# Patient Record
Sex: FEMALE | Race: WHITE | NOT HISPANIC OR LATINO
[De-identification: names, ages, dates, MRNs, and addresses within clinical notes are randomized per-mention and may not be internally consistent; named-entity substitution may affect disease eponyms.]

---

## 2017-01-25 ENCOUNTER — MEDICATION RENEWAL (OUTPATIENT)
Age: 57
End: 2017-01-25

## 2017-02-21 ENCOUNTER — MEDICATION RENEWAL (OUTPATIENT)
Age: 57
End: 2017-02-21

## 2017-02-22 ENCOUNTER — RX RENEWAL (OUTPATIENT)
Age: 57
End: 2017-02-22

## 2017-03-24 ENCOUNTER — OUTPATIENT (OUTPATIENT)
Dept: OUTPATIENT SERVICES | Facility: HOSPITAL | Age: 57
LOS: 1 days | End: 2017-03-24
Payer: COMMERCIAL

## 2017-03-24 VITALS
WEIGHT: 210.1 LBS | HEIGHT: 64 IN | OXYGEN SATURATION: 98 % | DIASTOLIC BLOOD PRESSURE: 60 MMHG | HEART RATE: 88 BPM | RESPIRATION RATE: 16 BRPM | SYSTOLIC BLOOD PRESSURE: 112 MMHG | TEMPERATURE: 98 F

## 2017-03-24 DIAGNOSIS — I25.10 ATHEROSCLEROTIC HEART DISEASE OF NATIVE CORONARY ARTERY WITHOUT ANGINA PECTORIS: ICD-10-CM

## 2017-03-24 LAB
ALBUMIN SERPL ELPH-MCNC: 4.1 G/DL — SIGNIFICANT CHANGE UP (ref 3.3–5)
ALP SERPL-CCNC: 49 U/L — SIGNIFICANT CHANGE UP (ref 40–120)
ALT FLD-CCNC: 16 U/L RC — SIGNIFICANT CHANGE UP (ref 10–45)
ANION GAP SERPL CALC-SCNC: 13 MMOL/L — SIGNIFICANT CHANGE UP (ref 5–17)
AST SERPL-CCNC: 26 U/L — SIGNIFICANT CHANGE UP (ref 10–40)
BILIRUB SERPL-MCNC: 0.3 MG/DL — SIGNIFICANT CHANGE UP (ref 0.2–1.2)
BUN SERPL-MCNC: 16 MG/DL — SIGNIFICANT CHANGE UP (ref 7–23)
CALCIUM SERPL-MCNC: 10.2 MG/DL — SIGNIFICANT CHANGE UP (ref 8.4–10.5)
CHLORIDE SERPL-SCNC: 99 MMOL/L — SIGNIFICANT CHANGE UP (ref 96–108)
CO2 SERPL-SCNC: 30 MMOL/L — SIGNIFICANT CHANGE UP (ref 22–31)
CREAT SERPL-MCNC: 1.01 MG/DL — SIGNIFICANT CHANGE UP (ref 0.5–1.3)
GLUCOSE SERPL-MCNC: 110 MG/DL — HIGH (ref 70–99)
HCT VFR BLD CALC: 35.2 % — SIGNIFICANT CHANGE UP (ref 34.5–45)
HGB BLD-MCNC: 11.8 G/DL — SIGNIFICANT CHANGE UP (ref 11.5–15.5)
MCHC RBC-ENTMCNC: 30 PG — SIGNIFICANT CHANGE UP (ref 27–34)
MCHC RBC-ENTMCNC: 33.5 GM/DL — SIGNIFICANT CHANGE UP (ref 32–36)
MCV RBC AUTO: 89.6 FL — SIGNIFICANT CHANGE UP (ref 80–100)
PLATELET # BLD AUTO: 286 K/UL — SIGNIFICANT CHANGE UP (ref 150–400)
POTASSIUM SERPL-MCNC: 4.9 MMOL/L — SIGNIFICANT CHANGE UP (ref 3.5–5.3)
POTASSIUM SERPL-SCNC: 4.9 MMOL/L — SIGNIFICANT CHANGE UP (ref 3.5–5.3)
PROT SERPL-MCNC: 7.1 G/DL — SIGNIFICANT CHANGE UP (ref 6–8.3)
RBC # BLD: 3.93 M/UL — SIGNIFICANT CHANGE UP (ref 3.8–5.2)
RBC # FLD: 12.1 % — SIGNIFICANT CHANGE UP (ref 10.3–14.5)
SODIUM SERPL-SCNC: 142 MMOL/L — SIGNIFICANT CHANGE UP (ref 135–145)
WBC # BLD: 7.7 K/UL — SIGNIFICANT CHANGE UP (ref 3.8–10.5)
WBC # FLD AUTO: 7.7 K/UL — SIGNIFICANT CHANGE UP (ref 3.8–10.5)

## 2017-03-24 PROCEDURE — 80053 COMPREHEN METABOLIC PANEL: CPT

## 2017-03-24 PROCEDURE — 93005 ELECTROCARDIOGRAM TRACING: CPT

## 2017-03-24 PROCEDURE — 93010 ELECTROCARDIOGRAM REPORT: CPT

## 2017-03-24 PROCEDURE — C1887: CPT

## 2017-03-24 PROCEDURE — 93460 R&L HRT ART/VENTRICLE ANGIO: CPT

## 2017-03-24 PROCEDURE — C1769: CPT

## 2017-03-24 PROCEDURE — 85027 COMPLETE CBC AUTOMATED: CPT

## 2017-03-24 PROCEDURE — C1894: CPT

## 2017-03-24 PROCEDURE — 93460 R&L HRT ART/VENTRICLE ANGIO: CPT | Mod: 26,GC

## 2017-03-24 NOTE — H&P CARDIOLOGY - PMH
Chronic sinusitis    Claustrophobia    COPD (chronic obstructive pulmonary disease)    DM (diabetes mellitus)    HTN (hypertension)    Hyperlipemia    Raynaud phenomenon

## 2017-03-24 NOTE — H&P CARDIOLOGY - HISTORY OF PRESENT ILLNESS
This is a 58 yo F w/ PMH of  COPD/emphysema, (GOLD 4, on home O2 3L NC/24 hrs a day), HTN, HLD, CAD s/p x6 stents ( last 02/2016 at [facility]),  DM type 2 ( under control as per patient, uncomplicated, last A1C unknown), pulmonary HTN, PVD.  Presents to Md Chapa with c/o of  dyspnea. Patient reports worsening SOB with minimal or no activity, worse since december.  Referred here today fro [facility] and Left [facility]. Currenlty on 3L nC, cont to have dyspnea, denies chest pain, palpitations, dizziness, N&V, HA.     Pulm: Md Anita Mathew    *** Of NOte: patient is trying to get back onto list for lung transplant*** This is a 56 yo F w/ PMH of  COPD/emphysema, (GOLD 4, on home O2 3L NC/24 hrs a day), HTN, HLD, CAD s/p x6 stents ( last 02/2016 at [facility]),  DM type 2 ( under control as per patient, uncomplicated, last A1C unknown), pulmonary HTN, PVD. Significant FH for MI ( father had MI at age 50).   Presents to Md Chapa with c/o of  dyspnea. Patient reports worsening SOB with minimal or no activity, worse since december.  Referred here today fro Right and Left [facility]. Currently on 3L nC, cont to have dyspnea, denies chest pain, palpitations, dizziness, N&V, HA.     Pulm: Md Anita Mathew    *** Of NOte: patient is trying to get back onto list for lung transplant***

## 2017-03-24 NOTE — H&P CARDIOLOGY - FAMILY HISTORY
Father  Still living? Unknown  FH: CABG (coronary artery bypass surgery), Age at diagnosis: Age Unknown  FH: MI (myocardial infarction), Age at diagnosis: Age Unknown

## 2017-04-10 ENCOUNTER — RX RENEWAL (OUTPATIENT)
Age: 57
End: 2017-04-10

## 2017-04-10 ENCOUNTER — APPOINTMENT (OUTPATIENT)
Dept: INTERNAL MEDICINE | Facility: CLINIC | Age: 57
End: 2017-04-10

## 2017-04-10 VITALS
SYSTOLIC BLOOD PRESSURE: 104 MMHG | HEIGHT: 64.5 IN | DIASTOLIC BLOOD PRESSURE: 66 MMHG | WEIGHT: 207 LBS | BODY MASS INDEX: 34.91 KG/M2 | OXYGEN SATURATION: 88 % | HEART RATE: 110 BPM | TEMPERATURE: 98.4 F

## 2017-04-10 DIAGNOSIS — Z23 ENCOUNTER FOR IMMUNIZATION: ICD-10-CM

## 2017-04-11 ENCOUNTER — RX RENEWAL (OUTPATIENT)
Age: 57
End: 2017-04-11

## 2017-04-14 LAB
25(OH)D3 SERPL-MCNC: 58.6 NG/ML
ALBUMIN SERPL ELPH-MCNC: 4 G/DL
ALP BLD-CCNC: 49 U/L
ALT SERPL-CCNC: 7 U/L
ANION GAP SERPL CALC-SCNC: 17 MMOL/L
AST SERPL-CCNC: 20 U/L
BASOPHILS # BLD AUTO: 0.01 K/UL
BASOPHILS NFR BLD AUTO: 0.1 %
BILIRUB SERPL-MCNC: 0.2 MG/DL
BUN SERPL-MCNC: 13 MG/DL
CALCIUM SERPL-MCNC: 10.4 MG/DL
CHLORIDE SERPL-SCNC: 96 MMOL/L
CHOLEST SERPL-MCNC: 165 MG/DL
CHOLEST/HDLC SERPL: 1.9 RATIO
CO2 SERPL-SCNC: 29 MMOL/L
CREAT SERPL-MCNC: 0.86 MG/DL
EOSINOPHIL # BLD AUTO: 0 K/UL
EOSINOPHIL NFR BLD AUTO: 0 %
GLUCOSE SERPL-MCNC: 125 MG/DL
HBA1C MFR BLD HPLC: 5.8 %
HCT VFR BLD CALC: 36.9 %
HDLC SERPL-MCNC: 86 MG/DL
HGB BLD-MCNC: 11.4 G/DL
IMM GRANULOCYTES NFR BLD AUTO: 0.3 %
IRON SERPL-MCNC: 70 UG/DL
LDLC SERPL CALC-MCNC: 69 MG/DL
LYMPHOCYTES # BLD AUTO: 0.7 K/UL
LYMPHOCYTES NFR BLD AUTO: 9.6 %
MAN DIFF?: NORMAL
MCHC RBC-ENTMCNC: 28.6 PG
MCHC RBC-ENTMCNC: 30.9 GM/DL
MCV RBC AUTO: 92.5 FL
MONOCYTES # BLD AUTO: 0.31 K/UL
MONOCYTES NFR BLD AUTO: 4.3 %
NEUTROPHILS # BLD AUTO: 6.22 K/UL
NEUTROPHILS NFR BLD AUTO: 85.7 %
PLATELET # BLD AUTO: 359 K/UL
POTASSIUM SERPL-SCNC: 5 MMOL/L
PROT SERPL-MCNC: 7.7 G/DL
RBC # BLD: 3.99 M/UL
RBC # FLD: 13.1 %
SODIUM SERPL-SCNC: 142 MMOL/L
TRIGL SERPL-MCNC: 51 MG/DL
TSH SERPL-ACNC: 0.87 UIU/ML
WBC # FLD AUTO: 7.26 K/UL

## 2017-04-27 ENCOUNTER — MEDICATION RENEWAL (OUTPATIENT)
Age: 57
End: 2017-04-27

## 2017-04-27 ENCOUNTER — APPOINTMENT (OUTPATIENT)
Dept: INTERNAL MEDICINE | Facility: CLINIC | Age: 57
End: 2017-04-27

## 2017-05-03 ENCOUNTER — APPOINTMENT (OUTPATIENT)
Dept: INTERNAL MEDICINE | Facility: CLINIC | Age: 57
End: 2017-05-03

## 2017-05-03 VITALS
TEMPERATURE: 98.4 F | WEIGHT: 204 LBS | SYSTOLIC BLOOD PRESSURE: 110 MMHG | OXYGEN SATURATION: 96 % | HEIGHT: 64.5 IN | DIASTOLIC BLOOD PRESSURE: 70 MMHG | BODY MASS INDEX: 34.4 KG/M2 | HEART RATE: 110 BPM

## 2017-05-03 RX ORDER — CEFUROXIME AXETIL 500 MG/1
500 TABLET ORAL
Qty: 20 | Refills: 0 | Status: DISCONTINUED | COMMUNITY
Start: 2017-04-10 | End: 2017-05-03

## 2017-05-22 ENCOUNTER — OTHER (OUTPATIENT)
Age: 57
End: 2017-05-22

## 2017-05-22 DIAGNOSIS — R29.898 OTHER SYMPTOMS AND SIGNS INVOLVING THE MUSCULOSKELETAL SYSTEM: ICD-10-CM

## 2017-06-05 ENCOUNTER — RX RENEWAL (OUTPATIENT)
Age: 57
End: 2017-06-05

## 2017-06-07 ENCOUNTER — APPOINTMENT (OUTPATIENT)
Dept: INTERNAL MEDICINE | Facility: CLINIC | Age: 57
End: 2017-06-07

## 2017-06-07 VITALS
OXYGEN SATURATION: 100 % | SYSTOLIC BLOOD PRESSURE: 100 MMHG | DIASTOLIC BLOOD PRESSURE: 70 MMHG | TEMPERATURE: 98.5 F | HEART RATE: 88 BPM | WEIGHT: 204 LBS | HEIGHT: 64 IN | BODY MASS INDEX: 34.83 KG/M2

## 2017-06-07 DIAGNOSIS — J20.9 ACUTE BRONCHITIS, UNSPECIFIED: ICD-10-CM

## 2017-06-07 DIAGNOSIS — J42 ACUTE BRONCHITIS, UNSPECIFIED: ICD-10-CM

## 2017-06-07 DIAGNOSIS — Z87.898 PERSONAL HISTORY OF OTHER SPECIFIED CONDITIONS: ICD-10-CM

## 2017-06-07 RX ORDER — PREDNISONE 10 MG/1
10 TABLET ORAL
Qty: 90 | Refills: 2 | Status: DISCONTINUED | COMMUNITY
Start: 2017-04-11 | End: 2017-06-07

## 2017-06-07 RX ORDER — AMOXICILLIN AND CLAVULANATE POTASSIUM 875; 125 MG/1; MG/1
875-125 TABLET, COATED ORAL TWICE DAILY
Qty: 20 | Refills: 0 | Status: DISCONTINUED | COMMUNITY
Start: 2017-04-27 | End: 2017-06-07

## 2017-06-07 RX ORDER — PREDNISONE 10 MG/1
10 TABLET ORAL
Qty: 90 | Refills: 0 | Status: DISCONTINUED | COMMUNITY
Start: 2017-04-10 | End: 2017-06-07

## 2017-06-07 RX ORDER — PREDNISONE 5 MG/1
5 TABLET ORAL DAILY
Qty: 90 | Refills: 5 | Status: DISCONTINUED | COMMUNITY
Start: 2017-05-03 | End: 2017-06-07

## 2017-06-07 RX ORDER — AZITHROMYCIN 250 MG/1
250 TABLET, FILM COATED ORAL
Qty: 1 | Refills: 0 | Status: DISCONTINUED | COMMUNITY
Start: 2017-04-27 | End: 2017-06-07

## 2017-06-23 ENCOUNTER — RX RENEWAL (OUTPATIENT)
Age: 57
End: 2017-06-23

## 2017-07-12 ENCOUNTER — MEDICATION RENEWAL (OUTPATIENT)
Age: 57
End: 2017-07-12

## 2017-07-18 ENCOUNTER — RX RENEWAL (OUTPATIENT)
Age: 57
End: 2017-07-18

## 2017-07-25 ENCOUNTER — APPOINTMENT (OUTPATIENT)
Dept: INTERNAL MEDICINE | Facility: CLINIC | Age: 57
End: 2017-07-25

## 2017-07-25 VITALS
DIASTOLIC BLOOD PRESSURE: 66 MMHG | OXYGEN SATURATION: 93 % | TEMPERATURE: 97.8 F | HEART RATE: 99 BPM | WEIGHT: 200 LBS | BODY MASS INDEX: 34.15 KG/M2 | HEIGHT: 64 IN | SYSTOLIC BLOOD PRESSURE: 110 MMHG

## 2017-07-28 LAB
25(OH)D3 SERPL-MCNC: 48.8 NG/ML
ALBUMIN MFR SERPL ELPH: 55.6 %
ALBUMIN SERPL ELPH-MCNC: 4.2 G/DL
ALBUMIN SERPL-MCNC: 4.3 G/DL
ALBUMIN/GLOB SERPL: 1.3 RATIO
ALP BLD-CCNC: 46 U/L
ALPHA1 GLOB MFR SERPL ELPH: 5.6 %
ALPHA1 GLOB SERPL ELPH-MCNC: 0.4 G/DL
ALPHA2 GLOB MFR SERPL ELPH: 13.1 %
ALPHA2 GLOB SERPL ELPH-MCNC: 1 G/DL
ALT SERPL-CCNC: 24 U/L
ANION GAP SERPL CALC-SCNC: 14 MMOL/L
AST SERPL-CCNC: 29 U/L
B-GLOBULIN MFR SERPL ELPH: 12.7 %
B-GLOBULIN SERPL ELPH-MCNC: 1 G/DL
BASOPHILS # BLD AUTO: 0.02 K/UL
BASOPHILS NFR BLD AUTO: 0.3 %
BILIRUB SERPL-MCNC: 0.3 MG/DL
BUN SERPL-MCNC: 10 MG/DL
CALCIUM SERPL-MCNC: 10.7 MG/DL
CHLORIDE SERPL-SCNC: 97 MMOL/L
CHOLEST SERPL-MCNC: 165 MG/DL
CHOLEST/HDLC SERPL: 1.9 RATIO
CO2 SERPL-SCNC: 30 MMOL/L
CREAT SERPL-MCNC: 1.15 MG/DL
EOSINOPHIL # BLD AUTO: 0.22 K/UL
EOSINOPHIL NFR BLD AUTO: 3.3 %
FOLATE SERPL-MCNC: 6.1 NG/ML
GAMMA GLOB FLD ELPH-MCNC: 1 G/DL
GAMMA GLOB MFR SERPL ELPH: 13 %
GLUCOSE SERPL-MCNC: 69 MG/DL
HAPTOGLOB SERPL-MCNC: 165 MG/DL
HBA1C MFR BLD HPLC: 5.9 %
HCT VFR BLD CALC: 35.8 %
HDLC SERPL-MCNC: 89 MG/DL
HGB BLD-MCNC: 10.5 G/DL
IMM GRANULOCYTES NFR BLD AUTO: 0.1 %
INTERPRETATION SERPL IEP-IMP: NORMAL
IRON SATN MFR SERPL: 12 %
IRON SERPL-MCNC: 41 UG/DL
LDLC SERPL CALC-MCNC: 62 MG/DL
LYMPHOCYTES # BLD AUTO: 1.39 K/UL
LYMPHOCYTES NFR BLD AUTO: 20.7 %
MAN DIFF?: NORMAL
MCHC RBC-ENTMCNC: 26.6 PG
MCHC RBC-ENTMCNC: 29.3 GM/DL
MCV RBC AUTO: 90.9 FL
MONOCYTES # BLD AUTO: 0.46 K/UL
MONOCYTES NFR BLD AUTO: 6.8 %
NEUTROPHILS # BLD AUTO: 4.63 K/UL
NEUTROPHILS NFR BLD AUTO: 68.8 %
PLATELET # BLD AUTO: 266 K/UL
POTASSIUM SERPL-SCNC: 4.7 MMOL/L
PROT SERPL-MCNC: 7.7 G/DL
RBC # BLD: 3.94 M/UL
RBC # FLD: 15.3 %
SODIUM SERPL-SCNC: 141 MMOL/L
TIBC SERPL-MCNC: 343 UG/DL
TRIGL SERPL-MCNC: 70 MG/DL
TSH SERPL-ACNC: 2.26 UIU/ML
UIBC SERPL-MCNC: 302 UG/DL
VIT B12 SERPL-MCNC: 813 PG/ML
WBC # FLD AUTO: 6.73 K/UL

## 2017-08-01 LAB
HGB A MFR BLD: 92.8 %
HGB A2 MFR BLD: 2.5 %
HGB F MFR BLD: 0 %
HGB FRACT BLD-IMP: NORMAL
HGB OTHER MFR BLD ELPH: 4.7 %

## 2017-08-14 ENCOUNTER — RX RENEWAL (OUTPATIENT)
Age: 57
End: 2017-08-14

## 2017-08-30 ENCOUNTER — APPOINTMENT (OUTPATIENT)
Dept: INTERNAL MEDICINE | Facility: CLINIC | Age: 57
End: 2017-08-30
Payer: COMMERCIAL

## 2017-08-30 VITALS
OXYGEN SATURATION: 94 % | DIASTOLIC BLOOD PRESSURE: 56 MMHG | BODY MASS INDEX: 34.15 KG/M2 | HEIGHT: 64 IN | TEMPERATURE: 97.9 F | WEIGHT: 200 LBS | SYSTOLIC BLOOD PRESSURE: 106 MMHG | HEART RATE: 96 BPM

## 2017-08-30 DIAGNOSIS — F32.9 MAJOR DEPRESSIVE DISORDER, SINGLE EPISODE, UNSPECIFIED: ICD-10-CM

## 2017-08-30 PROCEDURE — 99214 OFFICE O/P EST MOD 30 MIN: CPT | Mod: 25

## 2017-08-30 PROCEDURE — 36415 COLL VENOUS BLD VENIPUNCTURE: CPT

## 2017-09-01 LAB
ALBUMIN SERPL ELPH-MCNC: 4.3 G/DL
ALP BLD-CCNC: 38 U/L
ALT SERPL-CCNC: 18 U/L
ANION GAP SERPL CALC-SCNC: 16 MMOL/L
AST SERPL-CCNC: 22 U/L
BASOPHILS # BLD AUTO: 0.01 K/UL
BASOPHILS NFR BLD AUTO: 0.2 %
BILIRUB SERPL-MCNC: 0.2 MG/DL
BUN SERPL-MCNC: 18 MG/DL
CALCIUM SERPL-MCNC: 9.9 MG/DL
CHLORIDE SERPL-SCNC: 100 MMOL/L
CO2 SERPL-SCNC: 31 MMOL/L
CREAT SERPL-MCNC: 0.95 MG/DL
EOSINOPHIL # BLD AUTO: 0.13 K/UL
EOSINOPHIL NFR BLD AUTO: 2 %
GLUCOSE SERPL-MCNC: 90 MG/DL
HCT VFR BLD CALC: 37.3 %
HGB BLD-MCNC: 10.8 G/DL
IMM GRANULOCYTES NFR BLD AUTO: 0.2 %
IRON SERPL-MCNC: 62 UG/DL
LYMPHOCYTES # BLD AUTO: 1.39 K/UL
LYMPHOCYTES NFR BLD AUTO: 21.9 %
MAN DIFF?: NORMAL
MCHC RBC-ENTMCNC: 25.8 PG
MCHC RBC-ENTMCNC: 29 GM/DL
MCV RBC AUTO: 89.2 FL
MONOCYTES # BLD AUTO: 0.43 K/UL
MONOCYTES NFR BLD AUTO: 6.8 %
NEUTROPHILS # BLD AUTO: 4.39 K/UL
NEUTROPHILS NFR BLD AUTO: 68.9 %
PLATELET # BLD AUTO: 238 K/UL
POTASSIUM SERPL-SCNC: 4.8 MMOL/L
PROT SERPL-MCNC: 7.5 G/DL
RBC # BLD: 4.18 M/UL
RBC # FLD: 15 %
SODIUM SERPL-SCNC: 147 MMOL/L
WBC # FLD AUTO: 6.36 K/UL

## 2017-10-05 ENCOUNTER — RX RENEWAL (OUTPATIENT)
Age: 57
End: 2017-10-05

## 2017-10-08 ENCOUNTER — RX RENEWAL (OUTPATIENT)
Age: 57
End: 2017-10-08

## 2017-10-11 ENCOUNTER — APPOINTMENT (OUTPATIENT)
Dept: INTERNAL MEDICINE | Facility: CLINIC | Age: 57
End: 2017-10-11
Payer: COMMERCIAL

## 2017-10-11 VITALS
HEIGHT: 64 IN | BODY MASS INDEX: 34.15 KG/M2 | TEMPERATURE: 98.2 F | DIASTOLIC BLOOD PRESSURE: 74 MMHG | OXYGEN SATURATION: 91 % | WEIGHT: 200 LBS | SYSTOLIC BLOOD PRESSURE: 124 MMHG | HEART RATE: 95 BPM

## 2017-10-11 DIAGNOSIS — R49.0 DYSPHONIA: ICD-10-CM

## 2017-10-11 PROCEDURE — 93000 ELECTROCARDIOGRAM COMPLETE: CPT

## 2017-10-11 PROCEDURE — 99214 OFFICE O/P EST MOD 30 MIN: CPT | Mod: 25

## 2017-10-11 PROCEDURE — 36415 COLL VENOUS BLD VENIPUNCTURE: CPT

## 2017-10-13 LAB
ALBUMIN SERPL ELPH-MCNC: 4.2 G/DL
ALP BLD-CCNC: 43 U/L
ALT SERPL-CCNC: 15 U/L
ANION GAP SERPL CALC-SCNC: 13 MMOL/L
APPEARANCE: CLEAR
AST SERPL-CCNC: 24 U/L
BACTERIA UR CULT: NORMAL
BACTERIA: NEGATIVE
BASOPHILS # BLD AUTO: 0.02 K/UL
BASOPHILS NFR BLD AUTO: 0.3 %
BILIRUB SERPL-MCNC: 0.3 MG/DL
BILIRUBIN URINE: NEGATIVE
BLOOD URINE: NEGATIVE
BUN SERPL-MCNC: 22 MG/DL
CALCIUM SERPL-MCNC: 10.7 MG/DL
CHLORIDE SERPL-SCNC: 98 MMOL/L
CHOLEST SERPL-MCNC: 170 MG/DL
CHOLEST/HDLC SERPL: 2 RATIO
CO2 SERPL-SCNC: 31 MMOL/L
COLOR: YELLOW
CREAT SERPL-MCNC: 1.12 MG/DL
EOSINOPHIL # BLD AUTO: 0.18 K/UL
EOSINOPHIL NFR BLD AUTO: 2.6 %
GLUCOSE QUALITATIVE U: NEGATIVE MG/DL
GLUCOSE SERPL-MCNC: 78 MG/DL
HBA1C MFR BLD HPLC: 5.8 %
HCT VFR BLD CALC: 38 %
HDLC SERPL-MCNC: 85 MG/DL
HGB BLD-MCNC: 11.6 G/DL
HYALINE CASTS: 1 /LPF
IMM GRANULOCYTES NFR BLD AUTO: 0.1 %
IRON SERPL-MCNC: 72 UG/DL
KETONES URINE: NEGATIVE
LDLC SERPL CALC-MCNC: 70 MG/DL
LEUKOCYTE ESTERASE URINE: ABNORMAL
LYMPHOCYTES # BLD AUTO: 1.57 K/UL
LYMPHOCYTES NFR BLD AUTO: 22.5 %
MAN DIFF?: NORMAL
MCHC RBC-ENTMCNC: 27.3 PG
MCHC RBC-ENTMCNC: 30.5 GM/DL
MCV RBC AUTO: 89.4 FL
MICROSCOPIC-UA: NORMAL
MONOCYTES # BLD AUTO: 0.51 K/UL
MONOCYTES NFR BLD AUTO: 7.3 %
NEUTROPHILS # BLD AUTO: 4.68 K/UL
NEUTROPHILS NFR BLD AUTO: 67.2 %
NITRITE URINE: NEGATIVE
PH URINE: 5.5
PLATELET # BLD AUTO: 234 K/UL
POTASSIUM SERPL-SCNC: 4.8 MMOL/L
PROT SERPL-MCNC: 7.4 G/DL
PROTEIN URINE: NEGATIVE MG/DL
RBC # BLD: 4.25 M/UL
RBC # FLD: 14.9 %
RED BLOOD CELLS URINE: 3 /HPF
SODIUM SERPL-SCNC: 142 MMOL/L
SPECIFIC GRAVITY URINE: 1.02
SQUAMOUS EPITHELIAL CELLS: 2 /HPF
TRIGL SERPL-MCNC: 77 MG/DL
TSH SERPL-ACNC: 2.23 UIU/ML
UROBILINOGEN URINE: NEGATIVE MG/DL
WBC # FLD AUTO: 6.97 K/UL
WHITE BLOOD CELLS URINE: 2 /HPF

## 2017-11-08 ENCOUNTER — APPOINTMENT (OUTPATIENT)
Dept: INTERNAL MEDICINE | Facility: CLINIC | Age: 57
End: 2017-11-08
Payer: COMMERCIAL

## 2017-11-08 VITALS
SYSTOLIC BLOOD PRESSURE: 134 MMHG | HEIGHT: 64 IN | WEIGHT: 204 LBS | TEMPERATURE: 97.6 F | DIASTOLIC BLOOD PRESSURE: 64 MMHG | HEART RATE: 87 BPM | OXYGEN SATURATION: 99 % | BODY MASS INDEX: 34.83 KG/M2

## 2017-11-08 PROCEDURE — 99214 OFFICE O/P EST MOD 30 MIN: CPT | Mod: 25

## 2017-11-08 PROCEDURE — 90686 IIV4 VACC NO PRSV 0.5 ML IM: CPT

## 2017-11-08 PROCEDURE — G0008: CPT

## 2017-11-08 RX ORDER — DOXYCYCLINE 100 MG/1
100 CAPSULE ORAL TWICE DAILY
Qty: 20 | Refills: 0 | Status: DISCONTINUED | COMMUNITY
Start: 2017-10-11 | End: 2017-11-08

## 2017-11-14 LAB — HEMOCCULT STL QL IA: NEGATIVE

## 2017-11-28 ENCOUNTER — MEDICATION RENEWAL (OUTPATIENT)
Age: 57
End: 2017-11-28

## 2017-12-09 ENCOUNTER — RX RENEWAL (OUTPATIENT)
Age: 57
End: 2017-12-09

## 2017-12-18 ENCOUNTER — APPOINTMENT (OUTPATIENT)
Dept: INTERNAL MEDICINE | Facility: CLINIC | Age: 57
End: 2017-12-18
Payer: COMMERCIAL

## 2017-12-18 VITALS
WEIGHT: 200 LBS | HEART RATE: 82 BPM | TEMPERATURE: 97.8 F | DIASTOLIC BLOOD PRESSURE: 70 MMHG | OXYGEN SATURATION: 96 % | BODY MASS INDEX: 34.15 KG/M2 | SYSTOLIC BLOOD PRESSURE: 110 MMHG | HEIGHT: 64 IN

## 2017-12-18 DIAGNOSIS — R35.0 FREQUENCY OF MICTURITION: ICD-10-CM

## 2017-12-18 PROCEDURE — 99214 OFFICE O/P EST MOD 30 MIN: CPT

## 2018-01-31 ENCOUNTER — RX RENEWAL (OUTPATIENT)
Age: 58
End: 2018-01-31

## 2018-02-05 ENCOUNTER — APPOINTMENT (OUTPATIENT)
Dept: INTERNAL MEDICINE | Facility: CLINIC | Age: 58
End: 2018-02-05
Payer: COMMERCIAL

## 2018-02-05 ENCOUNTER — LABORATORY RESULT (OUTPATIENT)
Age: 58
End: 2018-02-05

## 2018-02-05 VITALS
TEMPERATURE: 97.8 F | HEART RATE: 90 BPM | HEIGHT: 64.5 IN | BODY MASS INDEX: 34.41 KG/M2 | OXYGEN SATURATION: 93 % | DIASTOLIC BLOOD PRESSURE: 60 MMHG | WEIGHT: 204.03 LBS | SYSTOLIC BLOOD PRESSURE: 120 MMHG

## 2018-02-05 PROCEDURE — 36415 COLL VENOUS BLD VENIPUNCTURE: CPT

## 2018-02-05 PROCEDURE — 99214 OFFICE O/P EST MOD 30 MIN: CPT | Mod: 25

## 2018-02-05 PROCEDURE — 94010 BREATHING CAPACITY TEST: CPT

## 2018-02-09 LAB
25(OH)D3 SERPL-MCNC: 59.9 NG/ML
BASOPHILS # BLD AUTO: 0.01 K/UL
BASOPHILS NFR BLD AUTO: 0.1 %
CALCIUM SERPL-MCNC: 10.7 MG/DL
CHOLEST SERPL-MCNC: 159 MG/DL
CHOLEST/HDLC SERPL: 1.9 RATIO
EOSINOPHIL # BLD AUTO: 0.24 K/UL
EOSINOPHIL NFR BLD AUTO: 3.5 %
HBA1C MFR BLD HPLC: 6.1 %
HCT VFR BLD CALC: 38.3 %
HDLC SERPL-MCNC: 83 MG/DL
HGB BLD-MCNC: 11.4 G/DL
IMM GRANULOCYTES NFR BLD AUTO: 0.3 %
IRON SERPL-MCNC: 84 UG/DL
LDLC SERPL CALC-MCNC: 65 MG/DL
LYMPHOCYTES # BLD AUTO: 1.79 K/UL
LYMPHOCYTES NFR BLD AUTO: 25.8 %
MAN DIFF?: NORMAL
MCHC RBC-ENTMCNC: 29.3 PG
MCHC RBC-ENTMCNC: 29.8 GM/DL
MCV RBC AUTO: 98.5 FL
MONOCYTES # BLD AUTO: 0.61 K/UL
MONOCYTES NFR BLD AUTO: 8.8 %
NEUTROPHILS # BLD AUTO: 4.27 K/UL
NEUTROPHILS NFR BLD AUTO: 61.5 %
PARATHYROID HORMONE INTACT: 26 PG/ML
PLATELET # BLD AUTO: 282 K/UL
RBC # BLD: 3.89 M/UL
RBC # FLD: 13.8 %
TRIGL SERPL-MCNC: 55 MG/DL
TSH SERPL-ACNC: 2.84 UIU/ML
WBC # FLD AUTO: 6.94 K/UL

## 2018-02-15 ENCOUNTER — MEDICATION RENEWAL (OUTPATIENT)
Age: 58
End: 2018-02-15

## 2018-02-15 RX ORDER — AMOXICILLIN AND CLAVULANATE POTASSIUM 875; 125 MG/1; MG/1
875-125 TABLET, COATED ORAL TWICE DAILY
Qty: 20 | Refills: 0 | Status: COMPLETED | COMMUNITY
Start: 2018-02-15 | End: 2018-02-25

## 2018-04-13 ENCOUNTER — NON-APPOINTMENT (OUTPATIENT)
Age: 58
End: 2018-04-13

## 2018-04-13 ENCOUNTER — APPOINTMENT (OUTPATIENT)
Dept: CARDIOLOGY | Facility: CLINIC | Age: 58
End: 2018-04-13
Payer: COMMERCIAL

## 2018-04-13 PROCEDURE — 93000 ELECTROCARDIOGRAM COMPLETE: CPT

## 2018-04-13 PROCEDURE — 99214 OFFICE O/P EST MOD 30 MIN: CPT

## 2018-04-16 ENCOUNTER — LABORATORY RESULT (OUTPATIENT)
Age: 58
End: 2018-04-16

## 2018-04-16 ENCOUNTER — APPOINTMENT (OUTPATIENT)
Dept: INTERNAL MEDICINE | Facility: CLINIC | Age: 58
End: 2018-04-16
Payer: COMMERCIAL

## 2018-04-16 VITALS
BODY MASS INDEX: 33.66 KG/M2 | HEART RATE: 98 BPM | RESPIRATION RATE: 15 BRPM | SYSTOLIC BLOOD PRESSURE: 132 MMHG | WEIGHT: 202 LBS | DIASTOLIC BLOOD PRESSURE: 84 MMHG | HEIGHT: 65 IN

## 2018-04-16 VITALS
OXYGEN SATURATION: 93 % | TEMPERATURE: 97.7 F | DIASTOLIC BLOOD PRESSURE: 80 MMHG | BODY MASS INDEX: 33.82 KG/M2 | HEART RATE: 96 BPM | WEIGHT: 203 LBS | SYSTOLIC BLOOD PRESSURE: 110 MMHG | HEIGHT: 65 IN

## 2018-04-16 PROCEDURE — 99214 OFFICE O/P EST MOD 30 MIN: CPT

## 2018-04-16 RX ORDER — CHLORHEXIDINE GLUCONATE 4 %
325 (65 FE) LIQUID (ML) TOPICAL DAILY
Qty: 30 | Refills: 1 | Status: DISCONTINUED | COMMUNITY
Start: 2016-03-04 | End: 2018-04-16

## 2018-04-16 RX ORDER — PREDNISONE 10 MG/1
10 TABLET ORAL
Qty: 90 | Refills: 1 | Status: DISCONTINUED | COMMUNITY
Start: 2017-10-11 | End: 2018-04-16

## 2018-04-16 RX ORDER — CHLORHEXIDINE GLUCONATE 4 %
325 (65 FE) LIQUID (ML) TOPICAL TWICE DAILY
Qty: 60 | Refills: 5 | Status: DISCONTINUED | COMMUNITY
Start: 2017-07-28 | End: 2018-04-16

## 2018-04-16 RX ORDER — PREDNISONE 10 MG/1
10 TABLET ORAL
Qty: 90 | Refills: 1 | Status: DISCONTINUED | COMMUNITY
Start: 2017-11-08 | End: 2018-04-16

## 2018-04-16 RX ORDER — SULFAMETHOXAZOLE AND TRIMETHOPRIM 800; 160 MG/1; MG/1
800-160 TABLET ORAL TWICE DAILY
Qty: 20 | Refills: 0 | Status: DISCONTINUED | COMMUNITY
Start: 2017-11-28 | End: 2018-04-16

## 2018-04-16 RX ORDER — SULFAMETHOXAZOLE AND TRIMETHOPRIM 800; 160 MG/1; MG/1
800-160 TABLET ORAL TWICE DAILY
Qty: 20 | Refills: 0 | Status: DISCONTINUED | COMMUNITY
Start: 2017-07-28 | End: 2018-04-16

## 2018-04-16 RX ORDER — FLUOXETINE HYDROCHLORIDE 10 MG/1
10 CAPSULE ORAL
Qty: 30 | Refills: 0 | Status: DISCONTINUED | COMMUNITY
Start: 2017-07-25 | End: 2018-04-16

## 2018-04-18 ENCOUNTER — FORM ENCOUNTER (OUTPATIENT)
Age: 58
End: 2018-04-18

## 2018-04-18 LAB
APPEARANCE: CLEAR
BACTERIA: NEGATIVE
BILIRUBIN URINE: NEGATIVE
BLOOD URINE: NEGATIVE
COLOR: YELLOW
GLUCOSE QUALITATIVE U: 250 MG/DL
KETONES URINE: NEGATIVE
LEUKOCYTE ESTERASE URINE: ABNORMAL
MICROSCOPIC-UA: NORMAL
NITRITE URINE: NEGATIVE
PH URINE: 7.5
PROTEIN URINE: NEGATIVE MG/DL
RED BLOOD CELLS URINE: 1 /HPF
SPECIFIC GRAVITY URINE: 1.01
SQUAMOUS EPITHELIAL CELLS: 3 /HPF
UROBILINOGEN URINE: NEGATIVE MG/DL
WHITE BLOOD CELLS URINE: 1 /HPF

## 2018-04-19 ENCOUNTER — RESULT REVIEW (OUTPATIENT)
Age: 58
End: 2018-04-19

## 2018-04-19 ENCOUNTER — APPOINTMENT (OUTPATIENT)
Dept: MAMMOGRAPHY | Facility: IMAGING CENTER | Age: 58
End: 2018-04-19
Payer: COMMERCIAL

## 2018-04-19 ENCOUNTER — OUTPATIENT (OUTPATIENT)
Dept: OUTPATIENT SERVICES | Facility: HOSPITAL | Age: 58
LOS: 1 days | End: 2018-04-19
Payer: COMMERCIAL

## 2018-04-19 DIAGNOSIS — R92.8 OTHER ABNORMAL AND INCONCLUSIVE FINDINGS ON DIAGNOSTIC IMAGING OF BREAST: ICD-10-CM

## 2018-04-19 PROCEDURE — 77065 DX MAMMO INCL CAD UNI: CPT | Mod: 26,RT

## 2018-04-19 PROCEDURE — A4648: CPT

## 2018-04-19 PROCEDURE — 19081 BX BREAST 1ST LESION STRTCTC: CPT

## 2018-04-19 PROCEDURE — 19081 BX BREAST 1ST LESION STRTCTC: CPT | Mod: RT

## 2018-04-19 PROCEDURE — 77065 DX MAMMO INCL CAD UNI: CPT

## 2018-04-19 PROCEDURE — 88305 TISSUE EXAM BY PATHOLOGIST: CPT

## 2018-04-19 PROCEDURE — 88305 TISSUE EXAM BY PATHOLOGIST: CPT | Mod: 26

## 2018-04-20 LAB — SURGICAL PATHOLOGY STUDY: SIGNIFICANT CHANGE UP

## 2018-04-27 ENCOUNTER — RX RENEWAL (OUTPATIENT)
Age: 58
End: 2018-04-27

## 2018-04-30 ENCOUNTER — RX RENEWAL (OUTPATIENT)
Age: 58
End: 2018-04-30

## 2018-05-24 ENCOUNTER — APPOINTMENT (OUTPATIENT)
Dept: INTERNAL MEDICINE | Facility: CLINIC | Age: 58
End: 2018-05-24
Payer: COMMERCIAL

## 2018-05-24 VITALS
BODY MASS INDEX: 33.29 KG/M2 | TEMPERATURE: 98.4 F | HEIGHT: 64 IN | HEART RATE: 75 BPM | OXYGEN SATURATION: 90 % | WEIGHT: 195 LBS | DIASTOLIC BLOOD PRESSURE: 60 MMHG | SYSTOLIC BLOOD PRESSURE: 120 MMHG

## 2018-05-24 DIAGNOSIS — Z87.898 PERSONAL HISTORY OF OTHER SPECIFIED CONDITIONS: ICD-10-CM

## 2018-05-24 DIAGNOSIS — R60.0 LOCALIZED EDEMA: ICD-10-CM

## 2018-05-24 PROCEDURE — 99214 OFFICE O/P EST MOD 30 MIN: CPT

## 2018-05-25 PROBLEM — R60.0 LOWER LEG EDEMA: Status: RESOLVED | Noted: 2017-06-07 | Resolved: 2018-05-25

## 2018-05-25 PROBLEM — Z87.898 HISTORY OF ABNORMAL MAMMOGRAM: Status: RESOLVED | Noted: 2018-04-10 | Resolved: 2018-05-25

## 2018-05-25 NOTE — REVIEW OF SYSTEMS
[Vision Problems] : vision problems [Nasal Discharge] : nasal discharge [Palpitations] : palpitations [Shortness Of Breath] : shortness of breath [Cough] : cough [Dyspnea on Exertion] : dyspnea on exertion [Incontinence] : incontinence [Joint Pain] : joint pain [Back Pain] : back pain [Insomnia] : insomnia [Anxiety] : anxiety [Depression] : depression [Negative] : Heme/Lymph

## 2018-05-25 NOTE — PHYSICAL EXAM
[No Acute Distress] : no acute distress [Well Nourished] : well nourished [Well Developed] : well developed [Well-Appearing] : well-appearing [Normal Voice/Communication] : normal voice/communication [Normal Sclera/Conjunctiva] : normal sclera/conjunctiva [PERRL] : pupils equal round and reactive to light [EOMI] : extraocular movements intact [Normal Outer Ear/Nose] : the outer ears and nose were normal in appearance [Normal Oropharynx] : the oropharynx was normal [No JVD] : no jugular venous distention [Supple] : supple [No Respiratory Distress] : no respiratory distress  [Clear to Auscultation] : lungs were clear to auscultation bilaterally [No Accessory Muscle Use] : no accessory muscle use [Normal Rate] : normal rate  [Regular Rhythm] : with a regular rhythm [Normal S1, S2] : normal S1 and S2 [No Carotid Bruits] : no carotid bruits [No Edema] : there was no peripheral edema [No Extremity Clubbing/Cyanosis] : no extremity clubbing/cyanosis [Soft] : abdomen soft [Non Tender] : non-tender [Normal Bowel Sounds] : normal bowel sounds [Normal Posterior Cervical Nodes] : no posterior cervical lymphadenopathy [Normal Anterior Cervical Nodes] : no anterior cervical lymphadenopathy [No CVA Tenderness] : no CVA  tenderness [No Spinal Tenderness] : no spinal tenderness [No Rash] : no rash [Normal Gait] : normal gait [Coordination Grossly Intact] : coordination grossly intact [No Focal Deficits] : no focal deficits [Speech Grossly Normal] : speech grossly normal [Alert and Oriented x3] : oriented to person, place, and time [de-identified] : Wears NC oxygen; NIELSON [de-identified] : no ST [de-identified] : no stridor [de-identified] : R=16-20; diminished AE; no wheezing [de-identified] : tearful at times

## 2018-05-25 NOTE — HISTORY OF PRESENT ILLNESS
[Family Member] : family member [de-identified] : See narrative below. [FreeTextEntry1] : Comes in for f/u medical visit. 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 Hasn't tried [place].\par Going to  6/20/18.\par Had abnormal mammogram and had benign biopsy.\par No complaint of her heart racing.\par No further epistaxis. Has chronic nasal congestion/postnasal drip\par No fever or hemoptysis. Cough about the same with no purulent sputum.\par SOB at rest and with minimal exertion. \par No chest pain. \par Remains depressed over poor quality of life. \par Denies orthopnea, PND, edema.\par Has chronic LBP. No extension into legs and no weakness or numbness in legs.\par Never saw orthopedics as directed for her abnormal femur. \par Never did colonoscopy. Has not seen GYN.\par Weight down. Good appetite.  Denies abdominal pain or nausea/vomiting. Denies hematuria, black/bloody stools, vaginal bleeding.

## 2018-05-25 NOTE — ASSESSMENT
[FreeTextEntry1] : Emphysema/COPD \par End-stage and progressive\par Continue oxygen - consider portable concentrator\par To do CT chest\par FVL refused today by patient\par To do PFT's, ABG and 6 minute walk \par Advised to stop all smoking\par Advised to start SD or home PT\par Weight control - advised WW/Tyesha Aguirre/Nutrisystem\par Symbicort bid\par Spiriva daily\par Duoneb nebs qid \par Singulair daily\par Charan daily\par Ventolin as needed\par Flu vaccine given for 2017\par To see ENT for evaluation of hoarseness/sinuses\par \par CAD\par S/P MI\par Palpitations improved\par Cardiology f/u  \par Continue Benicar for BP control \par Continue Crestor to control lipids\par Continue ASA, Isordil as per Dr. Chapa\par \par DM\par Good control\par Monitor HGBA1C\par Podiatry f/u\par Ophtho f/u\par Exercise\par Weight control\par ADA diet\par Continue metformin\par \par To see ortho for right femur issue and left shoulder pain and back pain\par To see GYN\par Consider psych consult for anxiety/depression\par Continue meds, diet, exercise as outlined\par F/U with all consulting MD's\par F/U at \par To see derm, ophtho, dentist\par All questions answered \par D/W patient in detail and again urged compliance with plan outlined above\par RTC 4 weeks and as needed\par To call for any medical/pulmonary issues\par To call if worse

## 2018-05-25 NOTE — HEALTH RISK ASSESSMENT
[No falls in past year] : Patient reported no falls in the past year [0] : 1) Little interest or pleasure doing things: Not at all (0) [1] : 2) Feeling down, depressed, or hopeless for several days (1) [] : No [de-identified] : none [de-identified] : none [[id number]] : 1

## 2018-05-25 NOTE — COUNSELING
[Weight management counseling provided] : Weight management [Healthy eating counseling provided] : healthy eating [Activity counseling provided] : activity [Weight Self Once Weekly] : Weight self once weekly [Low Fat Diet] : Low fat diet [Decrease Portions] : Decrease food portions [Low Salt Diet] : Low salt diet [Walking] : Walking [Patient Non-adherent to care plan] : Patient non-adherent to care plan [Patient motivation] : Patient motivation [Needs reinforcement, provided] : Patient needs reinforcement on understanding lifestyle changes and  the steps needed to achieve self management goals and reinforcement was provided

## 2018-06-04 ENCOUNTER — RX RENEWAL (OUTPATIENT)
Age: 58
End: 2018-06-04

## 2018-06-05 ENCOUNTER — RX RENEWAL (OUTPATIENT)
Age: 58
End: 2018-06-05

## 2018-06-12 ENCOUNTER — MEDICATION RENEWAL (OUTPATIENT)
Age: 58
End: 2018-06-12

## 2018-07-16 ENCOUNTER — APPOINTMENT (OUTPATIENT)
Dept: INTERNAL MEDICINE | Facility: CLINIC | Age: 58
End: 2018-07-16
Payer: COMMERCIAL

## 2018-07-16 VITALS
TEMPERATURE: 98.1 F | SYSTOLIC BLOOD PRESSURE: 120 MMHG | WEIGHT: 202 LBS | HEART RATE: 86 BPM | BODY MASS INDEX: 34.67 KG/M2 | DIASTOLIC BLOOD PRESSURE: 66 MMHG | OXYGEN SATURATION: 95 %

## 2018-07-16 PROCEDURE — 36415 COLL VENOUS BLD VENIPUNCTURE: CPT

## 2018-07-16 PROCEDURE — 99214 OFFICE O/P EST MOD 30 MIN: CPT | Mod: 25

## 2018-07-20 LAB
25(OH)D3 SERPL-MCNC: 60.2 NG/ML
ALBUMIN SERPL ELPH-MCNC: 4.1 G/DL
ALP BLD-CCNC: 37 U/L
ALT SERPL-CCNC: 13 U/L
ANION GAP SERPL CALC-SCNC: 15 MMOL/L
AST SERPL-CCNC: 23 U/L
BASOPHILS # BLD AUTO: 0.02 K/UL
BASOPHILS NFR BLD AUTO: 0.3 %
BILIRUB SERPL-MCNC: 0.3 MG/DL
BUN SERPL-MCNC: 12 MG/DL
CALCIUM SERPL-MCNC: 10.1 MG/DL
CHLORIDE SERPL-SCNC: 96 MMOL/L
CHOLEST SERPL-MCNC: 138 MG/DL
CHOLEST/HDLC SERPL: 1.9 RATIO
CO2 SERPL-SCNC: 31 MMOL/L
CREAT SERPL-MCNC: 1.02 MG/DL
EOSINOPHIL # BLD AUTO: 0.15 K/UL
EOSINOPHIL NFR BLD AUTO: 2.6 %
FOLATE SERPL-MCNC: >20 NG/ML
GLUCOSE SERPL-MCNC: 68 MG/DL
HBA1C MFR BLD HPLC: 5.4 %
HCT VFR BLD CALC: 36.9 %
HDLC SERPL-MCNC: 74 MG/DL
HGB BLD-MCNC: 10.9 G/DL
IMM GRANULOCYTES NFR BLD AUTO: 0.2 %
IRON SERPL-MCNC: 67 UG/DL
LDLC SERPL CALC-MCNC: 54 MG/DL
LYMPHOCYTES # BLD AUTO: 1.17 K/UL
LYMPHOCYTES NFR BLD AUTO: 20.2 %
MAN DIFF?: NORMAL
MCHC RBC-ENTMCNC: 28.7 PG
MCHC RBC-ENTMCNC: 29.5 GM/DL
MCV RBC AUTO: 97.1 FL
MONOCYTES # BLD AUTO: 0.48 K/UL
MONOCYTES NFR BLD AUTO: 8.3 %
NEUTROPHILS # BLD AUTO: 3.96 K/UL
NEUTROPHILS NFR BLD AUTO: 68.4 %
PLATELET # BLD AUTO: 238 K/UL
POTASSIUM SERPL-SCNC: 4.4 MMOL/L
PROT SERPL-MCNC: 7 G/DL
RBC # BLD: 3.8 M/UL
RBC # FLD: 13.1 %
SODIUM SERPL-SCNC: 142 MMOL/L
TRIGL SERPL-MCNC: 49 MG/DL
TSH SERPL-ACNC: 2.12 UIU/ML
VIT B12 SERPL-MCNC: 645 PG/ML
WBC # FLD AUTO: 5.79 K/UL

## 2018-07-30 ENCOUNTER — RX RENEWAL (OUTPATIENT)
Age: 58
End: 2018-07-30

## 2018-08-08 ENCOUNTER — APPOINTMENT (OUTPATIENT)
Dept: INTERNAL MEDICINE | Facility: CLINIC | Age: 58
End: 2018-08-08

## 2018-08-08 DIAGNOSIS — R51 HEADACHE: ICD-10-CM

## 2018-09-05 ENCOUNTER — RX RENEWAL (OUTPATIENT)
Age: 58
End: 2018-09-05

## 2018-09-20 ENCOUNTER — APPOINTMENT (OUTPATIENT)
Dept: INTERNAL MEDICINE | Facility: CLINIC | Age: 58
End: 2018-09-20
Payer: COMMERCIAL

## 2018-09-20 VITALS
HEIGHT: 64 IN | DIASTOLIC BLOOD PRESSURE: 60 MMHG | BODY MASS INDEX: 33.97 KG/M2 | TEMPERATURE: 98.7 F | OXYGEN SATURATION: 98 % | SYSTOLIC BLOOD PRESSURE: 100 MMHG | WEIGHT: 199 LBS | HEART RATE: 94 BPM

## 2018-09-20 DIAGNOSIS — Z23 ENCOUNTER FOR IMMUNIZATION: ICD-10-CM

## 2018-09-20 PROCEDURE — 94729 DIFFUSING CAPACITY: CPT

## 2018-09-20 PROCEDURE — 90750 HZV VACC RECOMBINANT IM: CPT

## 2018-09-20 PROCEDURE — G0008: CPT

## 2018-09-20 PROCEDURE — 90472 IMMUNIZATION ADMIN EACH ADD: CPT

## 2018-09-20 PROCEDURE — 90686 IIV4 VACC NO PRSV 0.5 ML IM: CPT

## 2018-09-20 PROCEDURE — 94060 EVALUATION OF WHEEZING: CPT

## 2018-09-20 PROCEDURE — 99214 OFFICE O/P EST MOD 30 MIN: CPT | Mod: 25

## 2018-09-20 NOTE — REVIEW OF SYSTEMS
[Vision Problems] : vision problems [Nasal Discharge] : nasal discharge [Shortness Of Breath] : shortness of breath [Cough] : cough [Dyspnea on Exertion] : dyspnea on exertion [Incontinence] : incontinence [Joint Pain] : joint pain [Back Pain] : back pain [Insomnia] : insomnia [Anxiety] : anxiety [Depression] : depression [Negative] : Heme/Lymph

## 2018-09-21 LAB
APPEARANCE: CLEAR
BACTERIA: NEGATIVE
BILIRUBIN URINE: NEGATIVE
BLOOD URINE: NEGATIVE
COLOR: YELLOW
CREAT SPEC-SCNC: 60 MG/DL
GLUCOSE QUALITATIVE U: NEGATIVE MG/DL
HYALINE CASTS: 0 /LPF
KETONES URINE: NEGATIVE
LEUKOCYTE ESTERASE URINE: ABNORMAL
MICROALBUMIN 24H UR DL<=1MG/L-MCNC: <1.2 MG/DL
MICROALBUMIN/CREAT 24H UR-RTO: NORMAL
MICROSCOPIC-UA: NORMAL
NITRITE URINE: NEGATIVE
PH URINE: 6
PROTEIN URINE: NEGATIVE MG/DL
RED BLOOD CELLS URINE: 0 /HPF
SPECIFIC GRAVITY URINE: 1.01
SQUAMOUS EPITHELIAL CELLS: 1 /HPF
UROBILINOGEN URINE: NEGATIVE MG/DL
WHITE BLOOD CELLS URINE: 4 /HPF

## 2018-09-21 NOTE — ASSESSMENT
[FreeTextEntry1] : Emphysema/COPD \par End-stage \par Continue oxygen - consider portable concentrator\par To do CT chest\par PFT's done today = see scanned report = d/w patient\par Advised to stop all smoking\par Continue [place]\par Weight control - advised WW/Tyesha Aguirre\par Breo Ellipta 1 puff daily\par Spiriva daily\par Duoneb nebs qid \par Singulair daily\par Ventolin as needed\par Has Prednisone as needed\par Flu vaccine given for 2018\par To see ENT for evaluation of hoarseness/sinuses\par \par CAD\par S/P MI\par Palpitations improved\par Cardiology f/u  \par Continue Benicar for BP control \par Continue Crestor to control lipids\par Continue ASA, Isordil as per Dr. Chapa\par \par DM\par Good control\par Monitor HGBA1C\par Podiatry f/u\par Ophtho f/u\par Exercise\par Weight control\par ADA diet\par Continue metformin\par \par To see ortho for right femur issue and left shoulder pain and back pain\par To see GYN\par Continue meds, diet, exercise as outlined\par F/U with all consulting MD's\par F/U at [facility]\par To see derm, ophtho, dentist\par All questions answered \par D/W patient in detail and again urged compliance with plan outlined above\par RTC 6-8 weeks and as needed\par To call for any medical/pulmonary issues\par To call if worse\par Shingrix given\par Urine sent

## 2018-09-21 NOTE — HISTORY OF PRESENT ILLNESS
[Family Member] : family member [de-identified] : See narrative below. [FreeTextEntry1] : Comes in for f/u medical visit. Going to [place] at [facility].\par Went to . Going back for further evaluation.\par No complaint of her heart racing.\par No further epistaxis. Has chronic nasal congestion/postnasal drip\par No fever or hemoptysis. Cough about the same with no purulent sputum.\par SOB at rest and with minimal exertion. ? more labored over the last 2 weeks.\par No chest pain. \par Remains saddened at times over poor quality of life. \par Denies orthopnea, PND, edema.\par Has chronic LBP. No extension into legs and no weakness or numbness in legs.\par Never saw orthopedics as directed for her abnormal femur. \par Never did colonoscopy. Has not seen GYN.\par Weight up and down. Good appetite.  Denies abdominal pain or nausea/vomiting. Denies hematuria, black/bloody stools, vaginal bleeding.

## 2018-09-21 NOTE — PHYSICAL EXAM
[No Acute Distress] : no acute distress [Well Nourished] : well nourished [Well Developed] : well developed [Well-Appearing] : well-appearing [Normal Voice/Communication] : normal voice/communication [Normal Sclera/Conjunctiva] : normal sclera/conjunctiva [PERRL] : pupils equal round and reactive to light [EOMI] : extraocular movements intact [Normal Outer Ear/Nose] : the outer ears and nose were normal in appearance [Normal Oropharynx] : the oropharynx was normal [No JVD] : no jugular venous distention [Supple] : supple [No Respiratory Distress] : no respiratory distress  [Clear to Auscultation] : lungs were clear to auscultation bilaterally [No Accessory Muscle Use] : no accessory muscle use [Normal Rate] : normal rate  [Regular Rhythm] : with a regular rhythm [Normal S1, S2] : normal S1 and S2 [No Carotid Bruits] : no carotid bruits [No Edema] : there was no peripheral edema [No Extremity Clubbing/Cyanosis] : no extremity clubbing/cyanosis [Soft] : abdomen soft [Non Tender] : non-tender [Normal Bowel Sounds] : normal bowel sounds [No CVA Tenderness] : no CVA  tenderness [No Spinal Tenderness] : no spinal tenderness [No Rash] : no rash [Normal Gait] : normal gait [Coordination Grossly Intact] : coordination grossly intact [No Focal Deficits] : no focal deficits [Speech Grossly Normal] : speech grossly normal [Alert and Oriented x3] : oriented to person, place, and time [de-identified] : Wears NC oxygen; NIELSON [de-identified] : no ST [de-identified] : no stridor [de-identified] : R=16-20; diminished AE; no wheezing [de-identified] : tearful at times

## 2018-09-21 NOTE — HEALTH RISK ASSESSMENT
[No falls in past year] : Patient reported no falls in the past year [0] : 2) Feeling down, depressed, or hopeless: Not at all (0) [] : No [de-identified] : none [de-identified] : pulm [[id number]] : 0

## 2018-09-22 LAB — BACTERIA UR CULT: NORMAL

## 2018-10-29 ENCOUNTER — RX RENEWAL (OUTPATIENT)
Age: 58
End: 2018-10-29

## 2018-11-21 ENCOUNTER — APPOINTMENT (OUTPATIENT)
Dept: INTERNAL MEDICINE | Facility: CLINIC | Age: 58
End: 2018-11-21
Payer: COMMERCIAL

## 2018-11-21 VITALS
BODY MASS INDEX: 33.97 KG/M2 | HEART RATE: 82 BPM | TEMPERATURE: 98 F | WEIGHT: 199 LBS | OXYGEN SATURATION: 86 % | SYSTOLIC BLOOD PRESSURE: 146 MMHG | HEIGHT: 64 IN | DIASTOLIC BLOOD PRESSURE: 70 MMHG

## 2018-11-21 VITALS — HEART RATE: 110 BPM | OXYGEN SATURATION: 92 %

## 2018-11-21 PROCEDURE — 36415 COLL VENOUS BLD VENIPUNCTURE: CPT

## 2018-11-21 PROCEDURE — 99214 OFFICE O/P EST MOD 30 MIN: CPT | Mod: 25

## 2018-11-21 RX ORDER — FLUTICASONE FUROATE AND VILANTEROL TRIFENATATE 200; 25 UG/1; UG/1
200-25 POWDER RESPIRATORY (INHALATION)
Qty: 180 | Refills: 0 | Status: DISCONTINUED | COMMUNITY
Start: 2018-04-16 | End: 2018-11-21

## 2018-11-26 LAB
ALBUMIN SERPL ELPH-MCNC: 3.8 G/DL
ALP BLD-CCNC: 48 U/L
ALT SERPL-CCNC: 14 U/L
ANION GAP SERPL CALC-SCNC: 12 MMOL/L
APPEARANCE: CLEAR
AST SERPL-CCNC: 18 U/L
BACTERIA: NEGATIVE
BASOPHILS # BLD AUTO: 0.01 K/UL
BASOPHILS NFR BLD AUTO: 0.1 %
BILIRUB SERPL-MCNC: 0.4 MG/DL
BILIRUBIN URINE: NEGATIVE
BLOOD URINE: NEGATIVE
BUN SERPL-MCNC: 23 MG/DL
CALCIUM SERPL-MCNC: 10.8 MG/DL
CHLORIDE SERPL-SCNC: 92 MMOL/L
CO2 SERPL-SCNC: 37 MMOL/L
COLOR: ABNORMAL
CREAT SERPL-MCNC: 1.1 MG/DL
CREAT SPEC-SCNC: 208 MG/DL
EOSINOPHIL # BLD AUTO: 0.15 K/UL
EOSINOPHIL NFR BLD AUTO: 1.4 %
GLUCOSE QUALITATIVE U: NEGATIVE MG/DL
GLUCOSE SERPL-MCNC: 200 MG/DL
HBA1C MFR BLD HPLC: 6.7 %
HCT VFR BLD CALC: 40.8 %
HGB BLD-MCNC: 12.1 G/DL
HYALINE CASTS: 1 /LPF
IMM GRANULOCYTES NFR BLD AUTO: 0.5 %
KETONES URINE: NEGATIVE
LEUKOCYTE ESTERASE URINE: NEGATIVE
LYMPHOCYTES # BLD AUTO: 0.57 K/UL
LYMPHOCYTES NFR BLD AUTO: 5.4 %
MAN DIFF?: NORMAL
MCHC RBC-ENTMCNC: 28.5 PG
MCHC RBC-ENTMCNC: 29.7 GM/DL
MCV RBC AUTO: 96.2 FL
MICROALBUMIN 24H UR DL<=1MG/L-MCNC: 3.4 MG/DL
MICROALBUMIN/CREAT 24H UR-RTO: 16 MG/G
MICROSCOPIC-UA: NORMAL
MONOCYTES # BLD AUTO: 0.52 K/UL
MONOCYTES NFR BLD AUTO: 4.9 %
NEUTROPHILS # BLD AUTO: 9.25 K/UL
NEUTROPHILS NFR BLD AUTO: 87.7 %
NITRITE URINE: NEGATIVE
PH URINE: 5.5
PLATELET # BLD AUTO: 266 K/UL
POTASSIUM SERPL-SCNC: 5.5 MMOL/L
PROT SERPL-MCNC: 7.2 G/DL
PROTEIN URINE: ABNORMAL MG/DL
RBC # BLD: 4.24 M/UL
RBC # FLD: 13.6 %
RED BLOOD CELLS URINE: 4 /HPF
SODIUM SERPL-SCNC: 141 MMOL/L
SPECIFIC GRAVITY URINE: 1.03
SQUAMOUS EPITHELIAL CELLS: 1 /HPF
THEOPHYLLINE SERPL SCN-MCNC: 9.9 UG/ML
UROBILINOGEN URINE: NEGATIVE MG/DL
WBC # FLD AUTO: 10.55 K/UL
WHITE BLOOD CELLS URINE: 3 /HPF

## 2018-11-29 ENCOUNTER — INPATIENT (INPATIENT)
Facility: HOSPITAL | Age: 58
LOS: 46 days | Discharge: HOME | DRG: 189 | End: 2019-01-15
Attending: HOSPITALIST | Admitting: HOSPITALIST
Payer: COMMERCIAL

## 2018-11-29 VITALS
DIASTOLIC BLOOD PRESSURE: 75 MMHG | OXYGEN SATURATION: 94 % | SYSTOLIC BLOOD PRESSURE: 149 MMHG | HEIGHT: 64 IN | HEART RATE: 116 BPM | RESPIRATION RATE: 26 BRPM | TEMPERATURE: 98 F | WEIGHT: 199.96 LBS

## 2018-11-29 DIAGNOSIS — I10 ESSENTIAL (PRIMARY) HYPERTENSION: ICD-10-CM

## 2018-11-29 DIAGNOSIS — J43.9 EMPHYSEMA, UNSPECIFIED: ICD-10-CM

## 2018-11-29 DIAGNOSIS — E11.65 TYPE 2 DIABETES MELLITUS WITH HYPERGLYCEMIA: ICD-10-CM

## 2018-11-29 DIAGNOSIS — J96.21 ACUTE AND CHRONIC RESPIRATORY FAILURE WITH HYPOXIA: ICD-10-CM

## 2018-11-29 DIAGNOSIS — I25.10 ATHEROSCLEROTIC HEART DISEASE OF NATIVE CORONARY ARTERY WITHOUT ANGINA PECTORIS: ICD-10-CM

## 2018-11-29 DIAGNOSIS — Z29.9 ENCOUNTER FOR PROPHYLACTIC MEASURES, UNSPECIFIED: ICD-10-CM

## 2018-11-29 DIAGNOSIS — J44.1 CHRONIC OBSTRUCTIVE PULMONARY DISEASE WITH (ACUTE) EXACERBATION: ICD-10-CM

## 2018-11-29 LAB
ALBUMIN SERPL ELPH-MCNC: 4.1 G/DL — SIGNIFICANT CHANGE UP (ref 3.3–5)
ALP SERPL-CCNC: 61 U/L — SIGNIFICANT CHANGE UP (ref 40–120)
ALT FLD-CCNC: 21 U/L — SIGNIFICANT CHANGE UP (ref 10–45)
ANION GAP SERPL CALC-SCNC: 14 MMOL/L — SIGNIFICANT CHANGE UP (ref 5–17)
AST SERPL-CCNC: 13 U/L — SIGNIFICANT CHANGE UP (ref 10–40)
BASE EXCESS BLDV CALC-SCNC: 10.1 MMOL/L — HIGH (ref -2–2)
BASOPHILS # BLD AUTO: 0.1 K/UL — SIGNIFICANT CHANGE UP (ref 0–0.2)
BASOPHILS NFR BLD AUTO: 0.6 % — SIGNIFICANT CHANGE UP (ref 0–2)
BILIRUB SERPL-MCNC: 0.4 MG/DL — SIGNIFICANT CHANGE UP (ref 0.2–1.2)
BUN SERPL-MCNC: 25 MG/DL — HIGH (ref 7–23)
CA-I SERPL-SCNC: 1.26 MMOL/L — SIGNIFICANT CHANGE UP (ref 1.12–1.3)
CALCIUM SERPL-MCNC: 10.1 MG/DL — SIGNIFICANT CHANGE UP (ref 8.4–10.5)
CHLORIDE BLDV-SCNC: 93 MMOL/L — LOW (ref 96–108)
CHLORIDE SERPL-SCNC: 91 MMOL/L — LOW (ref 96–108)
CO2 BLDV-SCNC: 39 MMOL/L — HIGH (ref 22–30)
CO2 SERPL-SCNC: 32 MMOL/L — HIGH (ref 22–31)
CREAT SERPL-MCNC: 1.22 MG/DL — SIGNIFICANT CHANGE UP (ref 0.5–1.3)
EOSINOPHIL # BLD AUTO: 0.1 K/UL — SIGNIFICANT CHANGE UP (ref 0–0.5)
EOSINOPHIL NFR BLD AUTO: 0.7 % — SIGNIFICANT CHANGE UP (ref 0–6)
GAS PNL BLDV: 136 MMOL/L — SIGNIFICANT CHANGE UP (ref 136–145)
GAS PNL BLDV: SIGNIFICANT CHANGE UP
GAS PNL BLDV: SIGNIFICANT CHANGE UP
GLUCOSE BLDC GLUCOMTR-MCNC: 423 MG/DL — HIGH (ref 70–99)
GLUCOSE BLDC GLUCOMTR-MCNC: 439 MG/DL — HIGH (ref 70–99)
GLUCOSE BLDV-MCNC: 224 MG/DL — HIGH (ref 70–99)
GLUCOSE SERPL-MCNC: 237 MG/DL — HIGH (ref 70–99)
HCO3 BLDV-SCNC: 37 MMOL/L — HIGH (ref 21–29)
HCT VFR BLD CALC: 40.2 % — SIGNIFICANT CHANGE UP (ref 34.5–45)
HCT VFR BLDA CALC: 40 % — SIGNIFICANT CHANGE UP (ref 39–50)
HGB BLD CALC-MCNC: 13.1 G/DL — SIGNIFICANT CHANGE UP (ref 11.5–15.5)
HGB BLD-MCNC: 13.3 G/DL — SIGNIFICANT CHANGE UP (ref 11.5–15.5)
LACTATE BLDV-MCNC: 1.7 MMOL/L — SIGNIFICANT CHANGE UP (ref 0.7–2)
LYMPHOCYTES # BLD AUTO: 0.4 K/UL — LOW (ref 1–3.3)
LYMPHOCYTES # BLD AUTO: 3.5 % — LOW (ref 13–44)
MCHC RBC-ENTMCNC: 28.6 PG — SIGNIFICANT CHANGE UP (ref 27–34)
MCHC RBC-ENTMCNC: 32.9 GM/DL — SIGNIFICANT CHANGE UP (ref 32–36)
MCV RBC AUTO: 86.9 FL — SIGNIFICANT CHANGE UP (ref 80–100)
MONOCYTES # BLD AUTO: 0.2 K/UL — SIGNIFICANT CHANGE UP (ref 0–0.9)
MONOCYTES NFR BLD AUTO: 1.9 % — LOW (ref 2–14)
NEUTROPHILS # BLD AUTO: 11.3 K/UL — HIGH (ref 1.8–7.4)
NEUTROPHILS NFR BLD AUTO: 93.4 % — HIGH (ref 43–77)
PCO2 BLDV: 62 MMHG — HIGH (ref 35–50)
PH BLDV: 7.39 — SIGNIFICANT CHANGE UP (ref 7.35–7.45)
PLATELET # BLD AUTO: 281 K/UL — SIGNIFICANT CHANGE UP (ref 150–400)
PO2 BLDV: 67 MMHG — HIGH (ref 25–45)
POTASSIUM BLDV-SCNC: 4.4 MMOL/L — SIGNIFICANT CHANGE UP (ref 3.5–5.3)
POTASSIUM SERPL-MCNC: 4.6 MMOL/L — SIGNIFICANT CHANGE UP (ref 3.5–5.3)
POTASSIUM SERPL-SCNC: 4.6 MMOL/L — SIGNIFICANT CHANGE UP (ref 3.5–5.3)
PROT SERPL-MCNC: 7.1 G/DL — SIGNIFICANT CHANGE UP (ref 6–8.3)
RBC # BLD: 4.63 M/UL — SIGNIFICANT CHANGE UP (ref 3.8–5.2)
RBC # FLD: 13.3 % — SIGNIFICANT CHANGE UP (ref 10.3–14.5)
SAO2 % BLDV: 93 % — HIGH (ref 67–88)
SODIUM SERPL-SCNC: 137 MMOL/L — SIGNIFICANT CHANGE UP (ref 135–145)
WBC # BLD: 12.1 K/UL — HIGH (ref 3.8–10.5)
WBC # FLD AUTO: 12.1 K/UL — HIGH (ref 3.8–10.5)

## 2018-11-29 PROCEDURE — 99284 EMERGENCY DEPT VISIT MOD MDM: CPT | Mod: 25

## 2018-11-29 PROCEDURE — 93010 ELECTROCARDIOGRAM REPORT: CPT | Mod: 59

## 2018-11-29 PROCEDURE — 99223 1ST HOSP IP/OBS HIGH 75: CPT

## 2018-11-29 PROCEDURE — 71045 X-RAY EXAM CHEST 1 VIEW: CPT | Mod: 26

## 2018-11-29 RX ORDER — INSULIN LISPRO 100/ML
VIAL (ML) SUBCUTANEOUS
Qty: 0 | Refills: 0 | Status: DISCONTINUED | OUTPATIENT
Start: 2018-11-29 | End: 2018-12-18

## 2018-11-29 RX ORDER — IPRATROPIUM/ALBUTEROL SULFATE 18-103MCG
3 AEROSOL WITH ADAPTER (GRAM) INHALATION ONCE
Qty: 0 | Refills: 0 | Status: COMPLETED | OUTPATIENT
Start: 2018-11-29 | End: 2018-11-29

## 2018-11-29 RX ORDER — SODIUM CHLORIDE 9 MG/ML
1000 INJECTION, SOLUTION INTRAVENOUS
Qty: 0 | Refills: 0 | Status: DISCONTINUED | OUTPATIENT
Start: 2018-11-29 | End: 2019-01-15

## 2018-11-29 RX ORDER — DEXTROSE 50 % IN WATER 50 %
25 SYRINGE (ML) INTRAVENOUS ONCE
Qty: 0 | Refills: 0 | Status: DISCONTINUED | OUTPATIENT
Start: 2018-11-29 | End: 2019-01-15

## 2018-11-29 RX ORDER — INSULIN LISPRO 100/ML
VIAL (ML) SUBCUTANEOUS AT BEDTIME
Qty: 0 | Refills: 0 | Status: DISCONTINUED | OUTPATIENT
Start: 2018-11-29 | End: 2018-12-18

## 2018-11-29 RX ORDER — DEXTROSE 50 % IN WATER 50 %
15 SYRINGE (ML) INTRAVENOUS ONCE
Qty: 0 | Refills: 0 | Status: DISCONTINUED | OUTPATIENT
Start: 2018-11-29 | End: 2019-01-15

## 2018-11-29 RX ORDER — DEXTROSE 50 % IN WATER 50 %
12.5 SYRINGE (ML) INTRAVENOUS ONCE
Qty: 0 | Refills: 0 | Status: DISCONTINUED | OUTPATIENT
Start: 2018-11-29 | End: 2019-01-15

## 2018-11-29 RX ORDER — OLMESARTAN MEDOXOMIL 5 MG/1
20 TABLET, FILM COATED ORAL DAILY
Qty: 0 | Refills: 0 | Status: DISCONTINUED | OUTPATIENT
Start: 2018-11-29 | End: 2018-12-07

## 2018-11-29 RX ORDER — MONTELUKAST 4 MG/1
10 TABLET, CHEWABLE ORAL DAILY
Qty: 0 | Refills: 0 | Status: DISCONTINUED | OUTPATIENT
Start: 2018-11-29 | End: 2019-01-15

## 2018-11-29 RX ORDER — THEOPHYLLINE ANHYDROUS 200 MG
400 TABLET, EXTENDED RELEASE 12 HR ORAL DAILY
Qty: 0 | Refills: 0 | Status: DISCONTINUED | OUTPATIENT
Start: 2018-11-29 | End: 2019-01-15

## 2018-11-29 RX ORDER — ENOXAPARIN SODIUM 100 MG/ML
40 INJECTION SUBCUTANEOUS EVERY 24 HOURS
Qty: 0 | Refills: 0 | Status: DISCONTINUED | OUTPATIENT
Start: 2018-11-29 | End: 2019-01-05

## 2018-11-29 RX ORDER — ROSUVASTATIN CALCIUM 5 MG/1
10 TABLET ORAL AT BEDTIME
Qty: 0 | Refills: 0 | Status: DISCONTINUED | OUTPATIENT
Start: 2018-11-29 | End: 2018-12-13

## 2018-11-29 RX ORDER — PRASUGREL 5 MG/1
1 TABLET, FILM COATED ORAL
Qty: 0 | Refills: 0 | COMMUNITY

## 2018-11-29 RX ORDER — ISOSORBIDE MONONITRATE 60 MG/1
30 TABLET, EXTENDED RELEASE ORAL DAILY
Qty: 0 | Refills: 0 | Status: DISCONTINUED | OUTPATIENT
Start: 2018-11-29 | End: 2019-01-15

## 2018-11-29 RX ORDER — CHOLECALCIFEROL (VITAMIN D3) 125 MCG
2000 CAPSULE ORAL DAILY
Qty: 0 | Refills: 0 | Status: DISCONTINUED | OUTPATIENT
Start: 2018-11-29 | End: 2019-01-15

## 2018-11-29 RX ORDER — IPRATROPIUM/ALBUTEROL SULFATE 18-103MCG
3 AEROSOL WITH ADAPTER (GRAM) INHALATION EVERY 6 HOURS
Qty: 0 | Refills: 0 | Status: DISCONTINUED | OUTPATIENT
Start: 2018-11-29 | End: 2018-11-30

## 2018-11-29 RX ORDER — GLUCAGON INJECTION, SOLUTION 0.5 MG/.1ML
1 INJECTION, SOLUTION SUBCUTANEOUS ONCE
Qty: 0 | Refills: 0 | Status: DISCONTINUED | OUTPATIENT
Start: 2018-11-29 | End: 2019-01-15

## 2018-11-29 RX ORDER — BUDESONIDE AND FORMOTEROL FUMARATE DIHYDRATE 160; 4.5 UG/1; UG/1
2 AEROSOL RESPIRATORY (INHALATION)
Qty: 0 | Refills: 0 | Status: DISCONTINUED | OUTPATIENT
Start: 2018-11-29 | End: 2018-11-30

## 2018-11-29 RX ORDER — ASPIRIN/CALCIUM CARB/MAGNESIUM 324 MG
81 TABLET ORAL DAILY
Qty: 0 | Refills: 0 | Status: DISCONTINUED | OUTPATIENT
Start: 2018-11-29 | End: 2019-01-15

## 2018-11-29 RX ADMIN — Medication 3 MILLILITER(S): at 14:13

## 2018-11-29 RX ADMIN — Medication 3 MILLILITER(S): at 21:37

## 2018-11-29 RX ADMIN — Medication 125 MILLIGRAM(S): at 14:13

## 2018-11-29 RX ADMIN — Medication 3 MILLILITER(S): at 14:12

## 2018-11-29 NOTE — H&P ADULT - NSHPLABSRESULTS_GEN_ALL_CORE
Labs personally reviewed : leukocytosis 12.1 with neutrophil predominance, rest of cbc unrevealing, cmp with relatively stable CKD2/3, hyperglycemia, vbg with compensated respiratory acidosis and normal lactate.  Imaging personally reviewed  CXR prelim no acute findings, no infiltrate.   EKG personally reviewed

## 2018-11-29 NOTE — H&P ADULT - PROBLEM SELECTOR PLAN 2
-increasing oxygen requirements now up to 4LNC, reportedly desat to 70s on RA but not recorded on flowsheets  -c/w copd mgt as above

## 2018-11-29 NOTE — ED PROVIDER NOTE - PHYSICAL EXAMINATION
attn - alert, mild resp distress at rest.  O2 at 3 LMP,  no cyanosis or pallor, Lungs - poor air entry bilat, no wheeze or rhonchi.  cor - rr, tachy, abdo - obese, soft, ND, NT, no CVAT, ext - no leg edema.  neuro - grossly intact.

## 2018-11-29 NOTE — H&P ADULT - HISTORY OF PRESENT ILLNESS
58 F PMH COPD on home O2 3L, HTN, HLD, CAD s/p x6 stents, T2DM, pHTN, PVD, p/w progressive worsening dyspnea x 2 weeks.    VS: 97.9, 108, 131/69, 22, 96% 3LNC.  Labs: leukocytosis 12.1 with neutrophil predominance, rest of cbc unrevealing, cmp with relatively stable CKD2/3, hyperglycemia, vbg with compensated respiratory acidosis and normal lactate. CXR prelim no acute findings, no infiltrate. In ER pt received duonebs x 3, solumedrol 125 x1 prior to medicine team involvement. 58 F PMH COPD on home O2 3L, HTN, HLD, CAD s/p x6 stents, T2DM, pHTN, PVD, p/w progressive worsening dyspnea x 2 weeks. Pt says she normally uses 3L NC atc at home. Infrequently leaves her house as of late. 2 wks ago, did leave her house a few times, once to go to pulm rehab, and felt much weaker than usual.  Has had increasing dyspnea and fatigue, worse with minimal exertion. Huffing/puffing for breath, sob with talking, showering, etc. +inc sputum volume production, pt says it is white in color, denies purulence. +inc O2 requirement now up to 4LNC at home during last 2 wks. +inc rescue inhaler use upto 7x/daily with minimal improvement.  Denies cough.  No significant inc in wheezing. +some runny nose but denies myalgias, sick contacts, sore throat; +flu shot this year.  Pt denies cp, denies f/c, denies n/v/d, denies orthopnea or significant increase in LE edema. Pt saw her pcp/pulm Dr. Mathew who started her on 10 d course of biaxin and prednisone 60 mg qd, which she has tapered down to 30 mg. Pt has completed a full 7 days of biaxin.     Of note pt was prev evaluated for lung transplant at [place], but during testing had NSTEMI requiring PCI x 6 in 2016, and was then on Effient for 1 year and recommended repeat testing/rehab prior to subsequent evaluation.     VS: 97.9, 108, 131/69, 22, 96% 3LNC.  Labs: leukocytosis 12.1 with neutrophil predominance, rest of cbc unrevealing, cmp with relatively stable CKD2/3, hyperglycemia, vbg with compensated respiratory acidosis and normal lactate. CXR prelim no acute findings, no infiltrate. In ER pt received duonebs x 3, solumedrol 125 x1 prior to medicine team involvement. Pt endorses improvement in dyspnea since treatment in ER. 58 F PMH COPD on home O2 3L, HTN, HLD, CAD s/p x6 stents, T2DM, pHTN, PVD, p/w progressive worsening dyspnea x 2 weeks. Pt says she normally uses 3L NC atc at home. Infrequently leaves her house as of late. 2 wks ago, did leave her house a few times, once to go to pulm rehab, and felt much weaker than usual.  Has had increasing dyspnea and fatigue, worse with minimal exertion. Huffing/puffing for breath, sob with talking, showering, etc. +inc sputum volume production, pt says it is white in color, denies purulence. +inc O2 requirement now up to 4LNC at home during last 2 wks. +inc rescue inhaler use upto 7x/daily with minimal improvement.  Denies cough.  No significant inc in wheezing. +some runny nose but denies myalgias, sick contacts, sore throat; +flu shot this year.  Pt denies cp, denies f/c, denies n/v/d, denies orthopnea or significant increase in LE edema. Pt saw her pcp/pulm Dr. Mathew who started her on 10 d course of biaxin and prednisone 60 mg qd, which she has tapered down to 30 mg. Pt has completed a full 7 days of biaxin. Was told to go to ER last week for eval but tried to manage at home, now her sx have progressed.     Of note pt was prev evaluated for lung transplant at [place], but during testing had NSTEMI requiring PCI x 6 in 2016, and was then on Effient for 1 year and recommended repeat testing/rehab prior to subsequent evaluation.     VS: 97.9, 108, 131/69, 22, 96% 3LNC.  Labs: leukocytosis 12.1 with neutrophil predominance, rest of cbc unrevealing, cmp with relatively stable CKD2/3, hyperglycemia, vbg with compensated respiratory acidosis and normal lactate. CXR prelim no acute findings, no infiltrate. In ER pt received duonebs x 3, solumedrol 125 x1 prior to medicine team involvement. Pt endorses improvement in dyspnea since treatment in ER.

## 2018-11-29 NOTE — ED ADULT NURSE NOTE - OBJECTIVE STATEMENT
58 year old female presents to the ED complaining of SOB and NIELSON x 2 weeks. Pt has Hx of stage 4 emphysema and COPD, Pt states 'this flare up started 2 weeks ago, I've been taking prednisone and oral antibiotics at home and they're not working'. Pt speaking clearly, breathing is labored and tachypneic with reduced breath sounds. Pt is A&O x 4, VSS, afebrile, ambulating independently. Pt denies fever, chills, NVD, or chest pain. Pt has HX of heart disease with 6 stents placed x 2 years ago. 58 year old female presents to the ED complaining of SOB and NIELSON x 2 weeks. Pt has Hx of stage 4 emphysema and COPD, Pt states 'this flare up started 2 weeks ago, I've been taking prednisone and oral antibiotics at home and they're not working'. Pt speaking clearly, breathing is labored and tachypneic with reduced breath sounds. Pt is A&O x 4, VSS with elevated HR and respirations, afebrile, ambulating independently. Pt denies fever, chills, NVD, or chest pain. Pt has HX of heart disease with 6 stents placed x 2 years ago. Pt states she cannot walk to the bathroom from her bed and this is not her baseline. Pt on chronic #L O2 at home. IV placed, labs drawn, bed in low position, safety measures in place. brother at bedside, call bell within reach, Pt verbalized understanding on use of call bell. Pt states she's had a reduced appetite. Pt using pursed lip breathing, coloring is flushed.

## 2018-11-29 NOTE — H&P ADULT - NSHPPHYSICALEXAM_GEN_ALL_CORE
PHYSICAL EXAM:  GENERAL: mild respiratory distress, well-groomed, obese  HEAD:  Atraumatic, Normocephalic  EYES: EOMI, PERRLA, conjunctiva and sclera clear  ENMT: No tonsillar erythema, exudates, or enlargement; Moist mucous membranes, Good dentition, No lesions  NECK: Supple, No JVD, Normal thyroid  CHEST/LUNG: poor air entry/movement b/l upper/lower lung fields. No significant wheezing 2/2 poor air entry. Pt appears to be breathing through pursed lips using some accessory muscles, but is able to now talk in full sentences when on arrival to ER she was not able to, and is subjectively endorsing feeling better.   HEART: Regular rate and rhythm; No murmurs, rubs, or gallops.  trace edema b/l LE nonpitting.   ABDOMEN: Soft, Nontender, Nondistended; Bowel sounds present. no rebound/guarding  EXTREMITIES:  2+ Peripheral Pulses, No clubbing, cyanosis.   LYMPH: No lymphadenopathy noted  SKIN: No rashes or lesions  NERVOUS SYSTEM:  Alert & Oriented X3, Good concentration; Motor Strength 5/5 B/L upper and lower extremities

## 2018-11-29 NOTE — ED ADULT NURSE NOTE - NSIMPLEMENTINTERV_GEN_ALL_ED
Implemented All Universal Safety Interventions:  [place] to call system. Call bell, personal items and telephone within reach. Instruct patient to call for assistance. Room bathroom lighting operational. Non-slip footwear when patient is off stretcher. Physically safe environment: no spills, clutter or unnecessary equipment. Stretcher in lowest position, wheels locked, appropriate side rails in place.

## 2018-11-29 NOTE — ED PROVIDER NOTE - CONSTITUTIONAL, MLM
normal... Well appearing, well nourished, awake, alert, oriented to person, place, time/situation and in no apparent distress.  Speaking in full clear sentences

## 2018-11-29 NOTE — ED ADULT NURSE NOTE - NS ED NOTE ABUSE SUSPICION NEGLECT YN
ANTICOAGULATION FOLLOW-UP CLINIC VISIT    Patient Name:  Kenyatta Donald  Date:  8/21/2017  Contact Type:  Face to Face    SUBJECTIVE:     Patient Findings     Positives No Problem Findings           OBJECTIVE    INR Protime   Date Value Ref Range Status   08/21/2017 2.2 (A) 0.86 - 1.14 Final       ASSESSMENT / PLAN  INR assessment THER    Recheck INR In: 6 WEEKS    INR Location Clinic      Anticoagulation Summary as of 8/21/2017     INR goal 2.5-3.5   Today's INR 2.2!   Maintenance plan 4 mg (4 mg x 1) on Mon, Fri; 5 mg (4 mg x 1 and 1 mg x 1) all other days   Full instructions 8/23: 5 mg; Otherwise 4 mg on Mon, Fri; 5 mg all other days   Weekly total 33 mg   Plan last modified Nancy Zavala RN (8/21/2017)   Next INR check 10/2/2017   Priority INR   Target end date     Indications   Long-term (current) use of anticoagulants [Z79.01] [Z79.01]  Atrial fibrillation (H) [I48.91]  Mitral valve disorder s/po 1-9-15 remodeling percut  + clip  (Resolved) [I05.9]         Anticoagulation Episode Summary     INR check location [facility]    Preferred lab     Send INR reminders to [facility]    Comments       Anticoagulation Care Providers     Provider Role Specialty Phone number    VelasquezBess ngo MD [facility] [phone]            See the Encounter Report to view Anticoagulation Flowsheet and Dosing Calendar (Go to Encounters tab in chart review, and find the Anticoagulation Therapy Visit)        Nancy Zavala RN                No

## 2018-11-29 NOTE — H&P ADULT - NSHPREVIEWOFSYSTEMS_GEN_ALL_CORE
REVIEW OF SYSTEMS:  CONSTITUTIONAL: +weakness. No fevers. No chills. No weight loss. Good appetite.  EYES: No visual changes. No eye pain.  ENT: No hearing difficulty. No vertigo. No dysphagia. No Sinusitis/ +rhinorrhea.  NECK: No pain. No stiffness/rigidity.  CARDIAC: No chest pain. No palpitations.  RESPIRATORY: No cough. +SOB. No hemoptysis.  GASTROINTESTINAL: No abdominal pain. No nausea. No vomiting. No hematemesis. No diarrhea. No constipation. No melena. No hematochezia.  GENITOURINARY: No dysuria. No frequency. No hesitancy. No hematuria.  NEUROLOGICAL: No numbness. No focal weakness. No incontinence. No headache.  BACK: No back pain.  EXTREMITIES: No new lower extremity edema. Full ROM.  SKIN: No rashes. No itching. No other lesions.  PSYCHIATRIC: No depression. No anxiety. No SI/HI.  ALLERGIC: No lip swelling. No hives.  All other review of systems is negative unless indicated above.

## 2018-11-29 NOTE — H&P ADULT - NSHPSOCIALHISTORY_GEN_ALL_CORE
Social History:    Marital Status:  (   )    (  x ) Single    (   )    (  )   Occupation:  working from home  Lives with: (  ) alone  (  ) children   (  ) spouse   (  ) parents  (x  ) other: her brother    Substance Use (street drugs): (  x) never used  (  ) other:  Tobacco Usage:  (   ) never smoked   (  x ) former smoker   (   ) current smoker  (  30 ppy quit 3 yrs ago   ) pack year  (        ) last cigarette date  Alcohol Usage: seldom

## 2018-11-29 NOTE — ED PROVIDER NOTE - OBJECTIVE STATEMENT
58 y.o. female with PMHx of stage 4 COPD and heart disease s/p 6 stents on 3L home oxygen presenting with 2 weeks SOB. Patient was started on Biaxin and prednisone pulmonologist was started on 60 currently on 30mg, without any improvement. Denies CP, fever, nausea, vomiting, or abdominal pain.  No cough.  Nothing makes her symptoms better, any exertion makes her symptoms worse.  These symptoms are typical of COPD exacerbations in the past. 58 y.o. female with PMHx of stage 4 COPD and heart disease s/p 6 stents on 3L home oxygen presenting with 2 weeks SOB. Patient was started on Biaxin and prednisone pulmonologist was started on 60 currently on 30mg, without any improvement. Denies CP, fever, nausea, vomiting, or abdominal pain.  No cough.  Nothing makes her symptoms better, any exertion makes her symptoms worse.  These symptoms are typical of COPD exacerbations in the past.     Attn - pt seen in [facility] - agree with above - exac of COPD x 2 weeks -now cannot walk to bathroom.  NO: fever, cp, hemoptysis, palp, n/v.  Pt was advise by her Pulmon to go to ER to be admitted last week.  Pt currently on steroid taper from 60 mgs to currently 30 mgs yest and today.  last admitted 4yrs ago for same.

## 2018-11-29 NOTE — H&P ADULT - ASSESSMENT
58 F PMH COPD on home O2 3L, HTN, HLD, CAD s/p x6 stents, T2DM, pHTN, PVD, p/w progressive worsening dyspnea x 2 weeks c/w COPD exacerbation not responsive to oral prednisone, now requiring IV steroids and bronchodilators.

## 2018-11-29 NOTE — H&P ADULT - ATTENDING COMMENTS
Care to be assumed by day hospitalist at 8 am.  This patient was assigned to me by the hospitalist in charge; my involvement in this case has consisted of the initial history, physical, chart review, and management plan.  This patient was previously unknown to me.  Case endorsed to NP ______ at ____.

## 2018-11-30 LAB
ALBUMIN SERPL ELPH-MCNC: 3.8 G/DL — SIGNIFICANT CHANGE UP (ref 3.3–5)
ALP SERPL-CCNC: 53 U/L — SIGNIFICANT CHANGE UP (ref 40–120)
ALT FLD-CCNC: 19 U/L — SIGNIFICANT CHANGE UP (ref 10–45)
ANION GAP SERPL CALC-SCNC: 10 MMOL/L — SIGNIFICANT CHANGE UP (ref 5–17)
ANION GAP SERPL CALC-SCNC: 13 MMOL/L — SIGNIFICANT CHANGE UP (ref 5–17)
AST SERPL-CCNC: 9 U/L — LOW (ref 10–40)
BASOPHILS # BLD AUTO: 0 K/UL — SIGNIFICANT CHANGE UP (ref 0–0.2)
BASOPHILS NFR BLD AUTO: 0 % — SIGNIFICANT CHANGE UP (ref 0–2)
BILIRUB SERPL-MCNC: 0.2 MG/DL — SIGNIFICANT CHANGE UP (ref 0.2–1.2)
BUN SERPL-MCNC: 25 MG/DL — HIGH (ref 7–23)
BUN SERPL-MCNC: 25 MG/DL — HIGH (ref 7–23)
CALCIUM SERPL-MCNC: 10.1 MG/DL — SIGNIFICANT CHANGE UP (ref 8.4–10.5)
CALCIUM SERPL-MCNC: 9.8 MG/DL — SIGNIFICANT CHANGE UP (ref 8.4–10.5)
CHLORIDE SERPL-SCNC: 92 MMOL/L — LOW (ref 96–108)
CHLORIDE SERPL-SCNC: 92 MMOL/L — LOW (ref 96–108)
CO2 SERPL-SCNC: 35 MMOL/L — HIGH (ref 22–31)
CO2 SERPL-SCNC: 36 MMOL/L — HIGH (ref 22–31)
CREAT SERPL-MCNC: 1.16 MG/DL — SIGNIFICANT CHANGE UP (ref 0.5–1.3)
CREAT SERPL-MCNC: 1.33 MG/DL — HIGH (ref 0.5–1.3)
EOSINOPHIL # BLD AUTO: 0 K/UL — SIGNIFICANT CHANGE UP (ref 0–0.5)
EOSINOPHIL NFR BLD AUTO: 0 % — SIGNIFICANT CHANGE UP (ref 0–6)
GLUCOSE BLDC GLUCOMTR-MCNC: 196 MG/DL — HIGH (ref 70–99)
GLUCOSE BLDC GLUCOMTR-MCNC: 233 MG/DL — HIGH (ref 70–99)
GLUCOSE BLDC GLUCOMTR-MCNC: 278 MG/DL — HIGH (ref 70–99)
GLUCOSE BLDC GLUCOMTR-MCNC: 304 MG/DL — HIGH (ref 70–99)
GLUCOSE SERPL-MCNC: 265 MG/DL — HIGH (ref 70–99)
GLUCOSE SERPL-MCNC: 273 MG/DL — HIGH (ref 70–99)
HBA1C BLD-MCNC: 7.2 % — HIGH (ref 4–5.6)
HCT VFR BLD CALC: 37.2 % — SIGNIFICANT CHANGE UP (ref 34.5–45)
HGB BLD-MCNC: 11.8 G/DL — SIGNIFICANT CHANGE UP (ref 11.5–15.5)
LYMPHOCYTES # BLD AUTO: 0.31 K/UL — LOW (ref 1–3.3)
LYMPHOCYTES # BLD AUTO: 3.6 % — LOW (ref 13–44)
MAGNESIUM SERPL-MCNC: 2.2 MG/DL — SIGNIFICANT CHANGE UP (ref 1.6–2.6)
MCHC RBC-ENTMCNC: 28.6 PG — SIGNIFICANT CHANGE UP (ref 27–34)
MCHC RBC-ENTMCNC: 31.7 GM/DL — LOW (ref 32–36)
MCV RBC AUTO: 90.1 FL — SIGNIFICANT CHANGE UP (ref 80–100)
MONOCYTES # BLD AUTO: 0.16 K/UL — SIGNIFICANT CHANGE UP (ref 0–0.9)
MONOCYTES NFR BLD AUTO: 1.8 % — LOW (ref 2–14)
NEUTROPHILS # BLD AUTO: 8.15 K/UL — HIGH (ref 1.8–7.4)
NEUTROPHILS NFR BLD AUTO: 93.7 % — HIGH (ref 43–77)
PHOSPHATE SERPL-MCNC: 4.9 MG/DL — HIGH (ref 2.5–4.5)
PLATELET # BLD AUTO: 213 K/UL — SIGNIFICANT CHANGE UP (ref 150–400)
POTASSIUM SERPL-MCNC: 4.6 MMOL/L — SIGNIFICANT CHANGE UP (ref 3.5–5.3)
POTASSIUM SERPL-MCNC: 5.9 MMOL/L — HIGH (ref 3.5–5.3)
POTASSIUM SERPL-SCNC: 4.6 MMOL/L — SIGNIFICANT CHANGE UP (ref 3.5–5.3)
POTASSIUM SERPL-SCNC: 5.9 MMOL/L — HIGH (ref 3.5–5.3)
PROT SERPL-MCNC: 6.4 G/DL — SIGNIFICANT CHANGE UP (ref 6–8.3)
RAPID RVP RESULT: SIGNIFICANT CHANGE UP
RBC # BLD: 4.13 M/UL — SIGNIFICANT CHANGE UP (ref 3.8–5.2)
RBC # FLD: 13.3 % — SIGNIFICANT CHANGE UP (ref 10.3–14.5)
SODIUM SERPL-SCNC: 138 MMOL/L — SIGNIFICANT CHANGE UP (ref 135–145)
SODIUM SERPL-SCNC: 140 MMOL/L — SIGNIFICANT CHANGE UP (ref 135–145)
WBC # BLD: 8.7 K/UL — SIGNIFICANT CHANGE UP (ref 3.8–10.5)
WBC # FLD AUTO: 8.7 K/UL — SIGNIFICANT CHANGE UP (ref 3.8–10.5)

## 2018-11-30 PROCEDURE — 94010 BREATHING CAPACITY TEST: CPT | Mod: 26

## 2018-11-30 PROCEDURE — 93010 ELECTROCARDIOGRAM REPORT: CPT

## 2018-11-30 PROCEDURE — 99233 SBSQ HOSP IP/OBS HIGH 50: CPT

## 2018-11-30 RX ORDER — INSULIN LISPRO 100/ML
5 VIAL (ML) SUBCUTANEOUS ONCE
Qty: 0 | Refills: 0 | Status: COMPLETED | OUTPATIENT
Start: 2018-11-30 | End: 2018-11-30

## 2018-11-30 RX ORDER — LIDOCAINE 4 G/100G
1 CREAM TOPICAL ONCE
Qty: 0 | Refills: 0 | Status: COMPLETED | OUTPATIENT
Start: 2018-11-30 | End: 2018-11-30

## 2018-11-30 RX ORDER — IPRATROPIUM/ALBUTEROL SULFATE 18-103MCG
3 AEROSOL WITH ADAPTER (GRAM) INHALATION
Qty: 0 | Refills: 0 | Status: DISCONTINUED | OUTPATIENT
Start: 2018-11-30 | End: 2019-01-06

## 2018-11-30 RX ORDER — BUDESONIDE, MICRONIZED 100 %
0.5 POWDER (GRAM) MISCELLANEOUS EVERY 12 HOURS
Qty: 0 | Refills: 0 | Status: DISCONTINUED | OUTPATIENT
Start: 2018-11-30 | End: 2019-01-15

## 2018-11-30 RX ADMIN — Medication 5 UNIT(S): at 00:08

## 2018-11-30 RX ADMIN — Medication 1 DROP(S): at 16:18

## 2018-11-30 RX ADMIN — Medication 40 MILLIGRAM(S): at 06:37

## 2018-11-30 RX ADMIN — ENOXAPARIN SODIUM 40 MILLIGRAM(S): 100 INJECTION SUBCUTANEOUS at 22:13

## 2018-11-30 RX ADMIN — Medication 3 MILLILITER(S): at 00:17

## 2018-11-30 RX ADMIN — Medication 4: at 22:13

## 2018-11-30 RX ADMIN — Medication 400 MILLIGRAM(S): at 14:20

## 2018-11-30 RX ADMIN — OLMESARTAN MEDOXOMIL 20 MILLIGRAM(S): 5 TABLET, FILM COATED ORAL at 06:37

## 2018-11-30 RX ADMIN — ISOSORBIDE MONONITRATE 30 MILLIGRAM(S): 60 TABLET, EXTENDED RELEASE ORAL at 13:02

## 2018-11-30 RX ADMIN — Medication 0.5 MILLIGRAM(S): at 18:51

## 2018-11-30 RX ADMIN — Medication 4: at 17:35

## 2018-11-30 RX ADMIN — Medication 3 MILLILITER(S): at 22:06

## 2018-11-30 RX ADMIN — Medication 3 MILLILITER(S): at 14:25

## 2018-11-30 RX ADMIN — Medication 2000 UNIT(S): at 13:03

## 2018-11-30 RX ADMIN — Medication 1 DROP(S): at 22:05

## 2018-11-30 RX ADMIN — Medication 3 MILLILITER(S): at 06:37

## 2018-11-30 RX ADMIN — Medication 1200 MILLIGRAM(S): at 18:51

## 2018-11-30 RX ADMIN — Medication 1 TABLET(S): at 13:03

## 2018-11-30 RX ADMIN — Medication 6: at 09:22

## 2018-11-30 RX ADMIN — ENOXAPARIN SODIUM 40 MILLIGRAM(S): 100 INJECTION SUBCUTANEOUS at 00:18

## 2018-11-30 RX ADMIN — BUDESONIDE AND FORMOTEROL FUMARATE DIHYDRATE 2 PUFF(S): 160; 4.5 AEROSOL RESPIRATORY (INHALATION) at 06:37

## 2018-11-30 RX ADMIN — Medication 40 MILLIGRAM(S): at 22:07

## 2018-11-30 RX ADMIN — ROSUVASTATIN CALCIUM 10 MILLIGRAM(S): 5 TABLET ORAL at 22:05

## 2018-11-30 RX ADMIN — Medication 3 MILLILITER(S): at 18:36

## 2018-11-30 RX ADMIN — MONTELUKAST 10 MILLIGRAM(S): 4 TABLET, CHEWABLE ORAL at 22:05

## 2018-11-30 RX ADMIN — Medication 2: at 13:05

## 2018-11-30 RX ADMIN — Medication 81 MILLIGRAM(S): at 13:03

## 2018-11-30 RX ADMIN — Medication 40 MILLIGRAM(S): at 13:04

## 2018-11-30 NOTE — CONSULT NOTE ADULT - ASSESSMENT
ASSESSMENT:    acute on chronic hypoxic and hypercapnic respiratory failure due to very severe COPD with exacerbation - adequate oxygenation on nasal canula in the setting of profound dyspnea suggests that alterations in the mechanics of breathing due to lung hyperinflation are playing a significant role in shortness of breath    HTN/HLD/DM    CAD s/p PCI    PAD    PLAN/RECOMMENDATIONS:    oxygen supplementation to keep saturation greater than 92%  ABG  will benefit from a trilogy ventilator - I will reach out to community surgical  would consider low dose narcotics for dyspnea watching closely for sedation or worsening hypercapnia - would not start without consultation with Dr. Mathew  albuterol/atrovent nebs q4h holding 2AM dose  pulmicort 0.5mg nebs q12h  singulair/theophylline  solumedrol 40mg IVP q8h - glucose control on steroids  cardiac meds: ASA/crestor/imdur/olmesartan  DVT prophylaxais - SQ lovenox  outpatient evaluation ongoing for lung transplantation  continue outpatient pulmonary rehabilitation    Thank you for the courtesy of this referral. Plan of care discussed at length with the patient at bedside and the housestaff.    David Fair MD, [facility] - [phone]  Pulmonary Medicine ASSESSMENT:    acute on chronic hypoxic and hypercapnic respiratory failure due to very severe COPD with exacerbation - adequate oxygenation on nasal canula in the setting of profound dyspnea suggests that alterations in the mechanics of breathing due to lung hyperinflation are playing a significant role in shortness of breath    HTN/HLD/DM    CAD s/p PCI    PAD    PLAN/RECOMMENDATIONS:    oxygen supplementation to keep saturation greater than 92%  ABG  will benefit from a trilogy ventilator - I will reach out to community surgical  would consider low dose narcotics for dyspnea watching closely for sedation or worsening hypercapnia - would not start without consultation with Dr. Mathew  albuterol/atrovent nebs q4h holding 2AM dose  pulmicort 0.5mg nebs q12h  singulair/theophylline/mucinex  solumedrol 40mg IVP q8h - glucose control on steroids  cardiac meds: ASA/crestor/imdur/olmesartan  DVT prophylaxais - SQ lovenox  outpatient evaluation ongoing for lung transplantation  continue outpatient pulmonary rehabilitation    Thank you for the courtesy of this referral. Plan of care discussed at length with the patient at bedside and the housestaff.    David Fair MD, [facility] - [phone]  Pulmonary Medicine

## 2018-11-30 NOTE — CONSULT NOTE ADULT - SUBJECTIVE AND OBJECTIVE BOX
[facility] - [facility]  CONSULT NOTE    HPI: 58 year old gentlewoman, former heavy smoker, with history of oxygen dependent COPD complicated by chronic hypercapnia (followed by Dr. Anita Mathew). The patient was evaluated at [facility] for lung transplantation but developed a NSTEMI requiring PCI and was therefore felt to be not a transplant candidate. She also has a history of HTN, DM, HLD, CAD s/p multiple PCIs and PAD. The patient was recently treated with biaxin and prednisone for worsening shortness of breath and increasing oxygen requirements.     PMHX:  COPD - oxygen dependent  HLD (hyperlipemia)  HTN (hypertension)  DM (diabetes mellitus)  CAD  PAD  Chronic sinusitis  Raynaud phenomenon  Claustrophobia    PSHX:  Tonsillectomy      FAMILY HISTORY:  father - CAD/MI/CABG    SOCIAL HISTORY:  single; works as a  from home; former smoker discontinued 3 years ago    Pulmonary Medications:   ALBUTerol/ipratropium for Nebulization 3 milliLiter(s) Nebulizer every 6 hours  buDESOnide 160 MICROgram(s)/formoterol 4.5 MICROgram(s) Inhaler 2 Puff(s) Inhalation two times a day  montelukast 10 milliGRAM(s) Oral daily  theophylline ER Capsule 400 milliGRAM(s) Oral daily      Antimicrobials:      Cardiology:  isosorbide   mononitrate ER Tablet (IMDUR) 30 milliGRAM(s) Oral daily  olmesartan 20 milliGRAM(s) Oral daily      Other:  aspirin enteric coated 81 milliGRAM(s) Oral daily  cholecalciferol 2000 Unit(s) Oral daily  dextrose 40% Gel 15 Gram(s) Oral once PRN  dextrose 5%. 1000 milliLiter(s) IV Continuous <Continuous>  dextrose 50% Injectable 12.5 Gram(s) IV Push once  dextrose 50% Injectable 25 Gram(s) IV Push once  dextrose 50% Injectable 25 Gram(s) IV Push once  enoxaparin Injectable 40 milliGRAM(s) SubCutaneous every 24 hours  glucagon  Injectable 1 milliGRAM(s) IntraMuscular once PRN  insulin lispro (HumaLOG) corrective regimen sliding scale   SubCutaneous three times a day before meals  insulin lispro (HumaLOG) corrective regimen sliding scale   SubCutaneous at bedtime  methylPREDNISolone sodium succinate Injectable 40 milliGRAM(s) IV Push every 8 hours  multivitamin 1 Tablet(s) Oral daily  rosuvastatin 10 milliGRAM(s) Oral at bedtime      Allergies    No Known Allergies    HOME MEDICATIONS: see  H & P    REVIEW OF SYSTEMS:  Constitutional: As per HPI  HEENT: Within normal limits  CV: As per HPI  Resp: As per HPI  GI: Within normal limits   : Within normal limits  Musculoskeletal: Within normal limits  Skin: Within normal limits  Neurological: Within normal limits  Psychiatric: Within normal limits  Endocrine: Within normal limits  Hematologic/Lymphatic: Within normal limits  Allergic/Immunologic: Within normal limits    [x] All other systems negative    OBJECTIVE:    I&O's Detail    29 Nov 2018 07:01  -  30 Nov 2018 07:00  --------------------------------------------------------  IN:    Oral Fluid: 340 mL  Total IN: 340 mL    OUT:  Total OUT: 0 mL    Total NET: 340 mL    Daily Height in cm: 162.56 (29 Nov 2018 21:39)      POCT Blood Glucose.: 278 mg/dL (30 Nov 2018 09:04)  POCT Blood Glucose.: 439 mg/dL (29 Nov 2018 23:35)  POCT Blood Glucose.: 423 mg/dL (29 Nov 2018 23:32)      PHYSICAL EXAM:  ICU Vital Signs Last 24 Hrs  T(C): 36.7 (30 Nov 2018 06:34), Max: 36.7 (29 Nov 2018 18:29)  T(F): 98.1 (30 Nov 2018 06:34), Max: 98.1 (29 Nov 2018 18:29)  HR: 83 (30 Nov 2018 06:34) (83 - 116)  BP: 154/89 (30 Nov 2018 06:34) (123/81 - 154/89)  BP(mean): --  ABP: --  ABP(mean): --  RR: 20 (30 Nov 2018 06:34) (20 - 28)  SpO2: 99% (30 Nov 2018 00:25) (94% - 99%)    General: Awake. Alert. Cooperative. No distress. Appears stated age 	  HEENT:   Atraumatic. Normocephalic. Anicteric. Normal oral mucosa. PERRL. EOMI.  Neck: Supple. Trachea midline. Thyroid without enlargement/tenderness/nodules. No carotid bruit. No JVD.	  Cardiovascular: Regular rate and rhythm. S1 S2 normal. No murmurs, rubs or gallops.  Respiratory: Respirations unlabored. Clear to auscultation and percussion bilaterally. No curvature.  Abdomen: Soft. Non-tender. Non-distended. No organomegaly. No masses. Normal bowel sounds.  Extremities: Warm to touch. No clubbing or cyanosis. No pedal edema.  Pulses: 2+ peripheral pulses all extremities.	  Skin: Normal skin color. No rashes or lesions. No ecchymoses. No cyanosis. Warm to touch.  Lymph Nodes: Cervical, supraclavicular and axillary nodes normal  Neurological: Motor and sensory examination equal and normal. A and O x 3  Psychiatry: Appropriate mood and affect.      LABS:                          11.8   8.70  )-----------( 213      ( 30 Nov 2018 07:58 )             37.2                         13.3   12.1  )-----------( 281      ( 29 Nov 2018 14:10 )             40.2     11-30    140  |  92<L>  |  25<H>  ----------------------------<  265<H>  5.9<H>   |  35<H>  |  1.33<H>    11-29    137  |  91<L>  |  25<H>  ----------------------------<  237<H>  4.6   |  32<H>  |  1.22    Ca      10.1      11-30    Ca      10.1      11-29    Phos    4.9     11-30      Mg       2.2     11-30    TPro  6.4  /  Alb  3.8  /  TBili  0.2  /  DBili  x   /  AST  9<L>  /  ALT  19  /  AlkPhos  53  11-30    TPro  7.1  /  Alb  4.1  /  TBili  0.4  /  DBili  x   /  AST  13  /  ALT  21  /  AlkPhos  61  11-29      Venous Blood Gas:  11-29 @ 14:10  7.39/62/67/37/93  VBG Lactate: 1.7                  MICROBIOLOGY:       RADIOLOGY:  [x ] Chest radiographs reviewed and interpreted by me [facility] - [facility]  CONSULT NOTE    HPI: 58 year old gentlewoman, former heavy smoker, with history of oxygen dependent COPD complicated by chronic hypercapnia (followed by Dr. Anita Mathew). The patient was evaluated at [facility] for lung transplantation in 2016 but developed a NSTEMI requiring PCIs x 6. She was therefore felt to be not a transplant candidate at that time but is undergoing reevaluation presently. She also has a history of HTN, DM, HLD, CAD and PAD. The patient was recently treated with biaxin and prednisone for worsening shortness of breath and increasing oxygen requirements. She refused hospital admission several weeks ago hoping to get better at home. She attends pulmonary rehabilitation at [facility] where she was noticed to be short of breath with minimal exertion. She has minimal chest congestion and wheeze. She has no fevers, chills or sweats. She has no chest pain/pressure or palpitations. She was brought to the hospital yesterday with profound shortness of breath after walking only several feet despite adequate oxygen saturation on a nasal canula. Asked to evaluate.    PMHX:  COPD - oxygen dependent  HLD (hyperlipemia)  HTN (hypertension)  DM (diabetes mellitus)  CAD  PAD  Chronic sinusitis  Raynaud phenomenon  Claustrophobia    PSHX:  Tonsillectomy    FAMILY HISTORY:  father - CAD/MI/CABG    SOCIAL HISTORY:  single; works as a  from home; former smoker discontinued 3 years ago    Pulmonary Medications:   ALBUTerol/ipratropium for Nebulization 3 milliLiter(s) Nebulizer every 6 hours  buDESOnide 160 MICROgram(s)/formoterol 4.5 MICROgram(s) Inhaler 2 Puff(s) Inhalation two times a day  montelukast 10 milliGRAM(s) Oral daily  theophylline ER Capsule 400 milliGRAM(s) Oral daily      Antimicrobials:      Cardiology:  isosorbide   mononitrate ER Tablet (IMDUR) 30 milliGRAM(s) Oral daily  olmesartan 20 milliGRAM(s) Oral daily      Other:  aspirin enteric coated 81 milliGRAM(s) Oral daily  cholecalciferol 2000 Unit(s) Oral daily  dextrose 40% Gel 15 Gram(s) Oral once PRN  dextrose 5%. 1000 milliLiter(s) IV Continuous <Continuous>  dextrose 50% Injectable 12.5 Gram(s) IV Push once  dextrose 50% Injectable 25 Gram(s) IV Push once  dextrose 50% Injectable 25 Gram(s) IV Push once  enoxaparin Injectable 40 milliGRAM(s) SubCutaneous every 24 hours  glucagon  Injectable 1 milliGRAM(s) IntraMuscular once PRN  insulin lispro (HumaLOG) corrective regimen sliding scale   SubCutaneous three times a day before meals  insulin lispro (HumaLOG) corrective regimen sliding scale   SubCutaneous at bedtime  methylPREDNISolone sodium succinate Injectable 40 milliGRAM(s) IV Push every 8 hours  multivitamin 1 Tablet(s) Oral daily  rosuvastatin 10 milliGRAM(s) Oral at bedtime      Allergies    No Known Allergies    HOME MEDICATIONS: see  H & P    REVIEW OF SYSTEMS:  Constitutional: As per HPI  HEENT: Within normal limits  CV: As per HPI  Resp: As per HPI  GI: Within normal limits   : Within normal limits  Musculoskeletal: pain/numbness lower extremities with ambulation  Skin: Within normal limits  Neurological: Within normal limits  Psychiatric: Within normal limits  Endocrine: Within normal limits  Hematologic/Lymphatic: Within normal limits  Allergic/Immunologic: Within normal limits    [x] All other systems negative    OBJECTIVE:    I&O's Detail    29 Nov 2018 07:01  -  30 Nov 2018 07:00  --------------------------------------------------------  IN:    Oral Fluid: 340 mL  Total IN: 340 mL    OUT:  Total OUT: 0 mL    Total NET: 340 mL    Daily Height in cm: 162.56 (29 Nov 2018 21:39)      POCT Blood Glucose.: 278 mg/dL (30 Nov 2018 09:04)  POCT Blood Glucose.: 439 mg/dL (29 Nov 2018 23:35)  POCT Blood Glucose.: 423 mg/dL (29 Nov 2018 23:32)      PHYSICAL EXAM:  ICU Vital Signs Last 24 Hrs  T(C): 36.7 (30 Nov 2018 06:34), Max: 36.7 (29 Nov 2018 18:29)  T(F): 98.1 (30 Nov 2018 06:34), Max: 98.1 (29 Nov 2018 18:29)  HR: 83 (30 Nov 2018 06:34) (83 - 116)  BP: 154/89 (30 Nov 2018 06:34) (123/81 - 154/89)  BP(mean): --  ABP: --  ABP(mean): --  RR: 20 (30 Nov 2018 06:34) (20 - 28)  SpO2: 99% (30 Nov 2018 00:25) (94% - 99%) on 3lpm nasal canula    General: Awake. Alert. Cooperative. No distress. Appears stated age. Obese 	  HEENT:  Atraumatic. Normocephalic. Anicteric. Normal oral mucosa. PERRL. EOMI.  Neck: Supple. Trachea midline. Thyroid without enlargement/tenderness/nodules. No carotid bruit. No JVD.	  Cardiovascular: Regular rate and rhythm. Distant S1 S2. No murmurs, rubs or gallops.  Respiratory: Respirations unlabored. Markedly decreased breath sounds throughout. Faint wheeze. Prolonged expiratory phase of respiration. No curvature.  Abdomen: Soft. Non-tender. Non-distended. No organomegaly. No masses. Normal bowel sounds. Obese  Extremities: Warm to touch. No clubbing or cyanosis. No pedal edema.  Pulses: Decreased lower extremity peripheral pulses  Skin: Erythematous palms. No rashes or lesions. No ecchymoses. No cyanosis. Warm to touch.  Lymph Nodes: Cervical, supraclavicular and axillary nodes normal  Neurological: Motor and sensory examination equal and normal. A and O x 3  Psychiatry: Appropriate mood and affect.      LABS:                          11.8   8.70  )-----------( 213      ( 30 Nov 2018 07:58 )             37.2                         13.3   12.1  )-----------( 281      ( 29 Nov 2018 14:10 )             40.2     11-30    140  |  92<L>  |  25<H>  ----------------------------<  265<H>  5.9<H>   |  35<H>  |  1.33<H>    11-29    137  |  91<L>  |  25<H>  ----------------------------<  237<H>  4.6   |  32<H>  |  1.22    Ca      10.1      11-30    Ca      10.1      11-29    Phos    4.9     11-30      Mg       2.2     11-30    TPro  6.4  /  Alb  3.8  /  TBili  0.2  /  DBili  x   /  AST  9<L>  /  ALT  19  /  AlkPhos  53  11-30    TPro  7.1  /  Alb  4.1  /  TBili  0.4  /  DBili  x   /  AST  13  /  ALT  21  /  AlkPhos  61  11-29    Venous Blood Gas:  11-29 @ 14:10  7.39/62/67/37/93  VBG Lactate: 1.7    < from: Transthoracic Echocardiogram (02.18.14 @ 08:32) >    Patient name: GUY HARTMAN  YOB: 1960   Age: 54 (F)   MR#: [id number]  Study Date: 2/18/2014  Location: [address] [place], [place] [id number]: [facility]  Study quality: Technically difficult  Referring Physician: John Gifford MD  Blood Pressure: 149/78 mmHg  Height: 5ft 6in  Weight: 195 lb  BSA: 2 m2  ------------------------------------------------------------------------  PROCEDURE: Transthoracic echocardiogram with 2-D, M-Mode  and complete spectral and color flow Doppler.  INDICATION: Dyspnea/SOB (786.09)  ------------------------------------------------------------------------  Dimensions:    Normal Values:  LA:     3.8    2.0 - 4.0 cm  Ao:     3.3    2.0 - 3.8 cm  SEPTUM: 0.9    0.6 - 1.2 cm  PWT:    0.9    0.6 - 1.1 cm  LVIDd:  4.9    3.0 - 5.6 cm  LVIDs:  3.2    1.8 - 4.0 cm  Derived variables:  LVMI: 77 g/m2  RWT: 0.36  Fractional short: 35 %  Ejection Fraction: 64 %  ------------------------------------------------------------------------  Observations:  Mitral Valve: Normal mitral valve. No significant mitral  valve regurgitation seen.  Aortic Valve/Aorta: Aortic valve not well visualized.  Normal aortic root.  Left Atrium: Normal left atrium.  LA volume index = 21  cc/m2.  Left Ventricle: Endocardium not well visualized; grossly  normal left ventricular systolic function. Normal left  ventricular internal dimensions and wall thicknesses.  Right Heart: Normal right atrium. Normal right ventricular  size and function. Tricuspid valve not well visualized,  probably normal. Trace tricuspid regurgitation. Pulmonic  valve not well visualized.  Hemodynamic: Estimated right atrial pressure is 10 mm Hg.  Inadequate tricuspid regurgitation Doppler envelope  precludes estimation of RVSP.  ------------------------------------------------------------------------  Conclusions:  1. Normal mitral valve. No significant mitral valve  regurgitation seen.  2. Aortic valve not well visualized.  3. Endocardium not well visualized; grossly normal left  ventricular systolic function.  4. Inadequate tricuspid regurgitation Doppler envelope  precludes estimation of RVSP.  5. Tricuspid valve not well visualized, probably normal.  Trace tricuspid regurgitation.  ------------------------------------------------------------------------  Confirmed on  2/18/2014 - 10:13:08 by David Schumacher M.D.  ------------------------------------------------------------------------    < end of copied text >  ---------------------------------------------------------------------------------------------------------------  MICROBIOLOGY:     Rapid Respiratory Viral Panel (11.30.18 @ 01:30)    Rapid RVP Result: NotDete: The [facility] RVP Rapid uses polymerase chain reaction (PCR) and melt  curve analysis to screen for adenovirus; coronavirus HKU1, NL63, 229E,  OC43; human metapneumovirus (hMPV); human enterovirus/rhinovirus  (Entero/RV); influenza A; influenza A/H1;influenza A/H3; influenza  A/H1-2009; influenza B; parainfluenza viruses 1, 2, 3, 4; respiratory  syncytial virus; Bordetella pertussis; Mycoplasma pneumoniae; and  Chlamydophila pneumoniae.    RADIOLOGY:  [x ] Chest radiographs reviewed and interpreted by me    < from: Xray Chest 1 View AP/PA (11.29.18 @ 14:53) >    EXAM:  XR CHEST AP OR PA 1V                          PROCEDURE DATE:  11/29/2018      INTERPRETATION:  A single chest x-ray was obtained on November 29, 2018.    Indication: Shortness of breath.    Impression:    The heart is normal in size. The lungs are clear. The visualized bones   are normal.    ALEX CUELLAR M.D., ATTENDING RADIOLOGIST  This document has been electronically signed. Nov 29 2018  2:59PM    < end of copied text >  ---------------------------------------------------------------------------------------------------------------  SPIROMETRY:     FEV1 0.56 liters - 22% predicted  FVC 1.73 liters - 52% predicted  FEV1% 32    c/w very severe obstructive lung disease

## 2018-11-30 NOTE — PROGRESS NOTE ADULT - PROBLEM SELECTOR PLAN 2
-increasing oxygen requirements now up to 4LNC, reportedly desat to 70s on RA but not recorded on flowsheets.   -c/w copd mgt as above, wean NC back to baseline 2-3 L, maintain sat > 88%

## 2018-11-30 NOTE — CHART NOTE - NSCHARTNOTEFT_GEN_A_CORE
Patient with chronic hypercapnia respiratory failure secondary to COPD, will require non invasive ventilation with avapse-ae. BiPAP has been considered and ruled out due to patients high PCO2 levels. Without non invasive ventilation, patient will continue to have high PCO2 levels and possible readmission.  Nafisa Hernandez(NP)  [address] [place] [address], [phone]

## 2018-11-30 NOTE — PROGRESS NOTE ADULT - PROBLEM SELECTOR PLAN 1
-severe on presentation, now improved. dyspnea, poor air entry b/l, inc sputum, respiratory acidosis, increasing oxygen requirement and rescue inhaler use. s/p o/p oral steroid taper and 7 day course of biaxin without improvement  - RVP negative  -s/p solumedrol and nebs in ER with improvement- continue solumedrol 40mg iv  -c/w solumedrol 40 q8 hours  -hold further abx as no purulent sputum and already s/p 7 d of appropriate abx  -c/w symbicort, hold spiriva for acute exacerbation and start duoneb atc to avoid dual anticholinergic effects, can transition back to spiriva upon discharge.  - c/w theophylline, singulair

## 2018-11-30 NOTE — PROGRESS NOTE ADULT - SUBJECTIVE AND OBJECTIVE BOX
Hospitalist- Christiano Hernandez MD  Pager: [phone]  If no response or off-hours, page [phone]  -------------------------------------  Patient is a 58y old  Female who presents with a chief complaint of dyspnea (29 Nov 2018 21:28)  Subjective: no acute events overnight, reports modest ilmprovement in SOB today- although notes she still gets winded after walking to bathroom. Not bad to baseline. No fevers/chills, no aches/pains, no cough/sputum. No abd pain/n/v. No dysuria/polyuria    14 point ros otherwise negative    VITAL SIGNS:  Vital Signs Last 24 Hrs  T(C): 36.7 (30 Nov 2018 06:34), Max: 36.7 (29 Nov 2018 18:29)  T(F): 98.1 (30 Nov 2018 06:34), Max: 98.1 (29 Nov 2018 18:29)  HR: 83 (30 Nov 2018 06:34) (83 - 116)  BP: 154/89 (30 Nov 2018 06:34) (123/81 - 154/89)  BP(mean): --  RR: 20 (30 Nov 2018 06:34) (20 - 28)  SpO2: 99% (30 Nov 2018 00:25) (94% - 99%)      PHYSICAL EXAM:     GENERAL: no acute distress  HEENT: PERRLA, EOMI, moist oropharynx   RESPIRATORY: +diminished breath sounds and prolonged expiratory phase b/l, CTAB, no w/c   CARDIOVASCULAR: RRR, no murmurs, gallops, rubs  ABDOMINAL: soft, non-tender, non-distended, positive bowel sounds   EXTREMITIES: no clubbing, cyanosis, +trace pitting edema B/L  NEUROLOGICAL: alert and oriented x 3, non-focal  SKIN: no rashes or lesions   MUSCULOSKELETAL: no gross joint deformity                          11.8   8.70  )-----------( 213      ( 30 Nov 2018 07:58 )             37.2     11-30    138  |  92<L>  |  25<H>  ----------------------------<  273<H>  4.6   |  36<H>  |  1.16    Ca    9.8      30 Nov 2018 10:32  Phos  4.9     11-30  Mg     2.2     11-30    TPro  6.4  /  Alb  3.8  /  TBili  0.2  /  DBili  x   /  AST  9<L>  /  ALT  19  /  AlkPhos  53  11-30      CAPILLARY BLOOD GLUCOSE      POCT Blood Glucose.: 278 mg/dL (30 Nov 2018 09:04)  POCT Blood Glucose.: 439 mg/dL (29 Nov 2018 23:35)  POCT Blood Glucose.: 423 mg/dL (29 Nov 2018 23:32)      MEDICATIONS  (STANDING):  ALBUTerol/ipratropium for Nebulization 3 milliLiter(s) Nebulizer every 6 hours  aspirin enteric coated 81 milliGRAM(s) Oral daily  buDESOnide 160 MICROgram(s)/formoterol 4.5 MICROgram(s) Inhaler 2 Puff(s) Inhalation two times a day  cholecalciferol 2000 Unit(s) Oral daily  dextrose 5%. 1000 milliLiter(s) (50 mL/Hr) IV Continuous <Continuous>  dextrose 50% Injectable 12.5 Gram(s) IV Push once  dextrose 50% Injectable 25 Gram(s) IV Push once  dextrose 50% Injectable 25 Gram(s) IV Push once  enoxaparin Injectable 40 milliGRAM(s) SubCutaneous every 24 hours  insulin lispro (HumaLOG) corrective regimen sliding scale   SubCutaneous three times a day before meals  insulin lispro (HumaLOG) corrective regimen sliding scale   SubCutaneous at bedtime  isosorbide   mononitrate ER Tablet (IMDUR) 30 milliGRAM(s) Oral daily  methylPREDNISolone sodium succinate Injectable 40 milliGRAM(s) IV Push every 8 hours  montelukast 10 milliGRAM(s) Oral daily  multivitamin 1 Tablet(s) Oral daily  olmesartan 20 milliGRAM(s) Oral daily  rosuvastatin 10 milliGRAM(s) Oral at bedtime  theophylline ER Capsule 400 milliGRAM(s) Oral daily    MEDICATIONS  (PRN):  dextrose 40% Gel 15 Gram(s) Oral once PRN Blood Glucose LESS THAN 70 milliGRAM(s)/deciliter  glucagon  Injectable 1 milliGRAM(s) IntraMuscular once PRN Glucose LESS THAN 70 milligrams/deciliter

## 2018-12-01 LAB
ANION GAP SERPL CALC-SCNC: 12 MMOL/L — SIGNIFICANT CHANGE UP (ref 5–17)
BUN SERPL-MCNC: 34 MG/DL — HIGH (ref 7–23)
CALCIUM SERPL-MCNC: 10.1 MG/DL — SIGNIFICANT CHANGE UP (ref 8.4–10.5)
CHLORIDE SERPL-SCNC: 92 MMOL/L — LOW (ref 96–108)
CO2 SERPL-SCNC: 35 MMOL/L — HIGH (ref 22–31)
CREAT SERPL-MCNC: 1.35 MG/DL — HIGH (ref 0.5–1.3)
GLUCOSE BLDC GLUCOMTR-MCNC: 233 MG/DL — HIGH (ref 70–99)
GLUCOSE BLDC GLUCOMTR-MCNC: 303 MG/DL — HIGH (ref 70–99)
GLUCOSE BLDC GLUCOMTR-MCNC: 342 MG/DL — HIGH (ref 70–99)
GLUCOSE BLDC GLUCOMTR-MCNC: 349 MG/DL — HIGH (ref 70–99)
GLUCOSE SERPL-MCNC: 321 MG/DL — HIGH (ref 70–99)
POTASSIUM SERPL-MCNC: 5.7 MMOL/L — HIGH (ref 3.5–5.3)
POTASSIUM SERPL-SCNC: 5.7 MMOL/L — HIGH (ref 3.5–5.3)
SODIUM SERPL-SCNC: 139 MMOL/L — SIGNIFICANT CHANGE UP (ref 135–145)

## 2018-12-01 PROCEDURE — 99233 SBSQ HOSP IP/OBS HIGH 50: CPT

## 2018-12-01 RX ORDER — SODIUM CHLORIDE 0.65 %
1 AEROSOL, SPRAY (ML) NASAL
Qty: 0 | Refills: 0 | Status: DISCONTINUED | OUTPATIENT
Start: 2018-12-01 | End: 2019-01-15

## 2018-12-01 RX ADMIN — Medication 81 MILLIGRAM(S): at 13:40

## 2018-12-01 RX ADMIN — Medication 40 MILLIGRAM(S): at 06:48

## 2018-12-01 RX ADMIN — Medication 40 MILLIGRAM(S): at 13:42

## 2018-12-01 RX ADMIN — Medication 3 MILLILITER(S): at 10:13

## 2018-12-01 RX ADMIN — Medication 3 MILLILITER(S): at 15:12

## 2018-12-01 RX ADMIN — Medication 2000 UNIT(S): at 13:41

## 2018-12-01 RX ADMIN — Medication 0.5 MILLIGRAM(S): at 06:48

## 2018-12-01 RX ADMIN — ROSUVASTATIN CALCIUM 10 MILLIGRAM(S): 5 TABLET ORAL at 22:11

## 2018-12-01 RX ADMIN — Medication 1200 MILLIGRAM(S): at 18:27

## 2018-12-01 RX ADMIN — Medication 40 MILLIGRAM(S): at 22:09

## 2018-12-01 RX ADMIN — ISOSORBIDE MONONITRATE 30 MILLIGRAM(S): 60 TABLET, EXTENDED RELEASE ORAL at 13:40

## 2018-12-01 RX ADMIN — Medication 8: at 18:28

## 2018-12-01 RX ADMIN — Medication 3 MILLILITER(S): at 18:27

## 2018-12-01 RX ADMIN — Medication 4: at 22:12

## 2018-12-01 RX ADMIN — Medication 400 MILLIGRAM(S): at 15:12

## 2018-12-01 RX ADMIN — Medication 1 SPRAY(S): at 22:08

## 2018-12-01 RX ADMIN — Medication 0.5 MILLIGRAM(S): at 18:28

## 2018-12-01 RX ADMIN — MONTELUKAST 10 MILLIGRAM(S): 4 TABLET, CHEWABLE ORAL at 13:40

## 2018-12-01 RX ADMIN — Medication 1200 MILLIGRAM(S): at 06:48

## 2018-12-01 RX ADMIN — Medication 1 TABLET(S): at 13:40

## 2018-12-01 RX ADMIN — Medication 1 DROP(S): at 22:09

## 2018-12-01 RX ADMIN — Medication 3 MILLILITER(S): at 22:09

## 2018-12-01 RX ADMIN — OLMESARTAN MEDOXOMIL 20 MILLIGRAM(S): 5 TABLET, FILM COATED ORAL at 06:49

## 2018-12-01 RX ADMIN — Medication 4: at 12:43

## 2018-12-01 RX ADMIN — Medication 1 DROP(S): at 13:41

## 2018-12-01 RX ADMIN — ENOXAPARIN SODIUM 40 MILLIGRAM(S): 100 INJECTION SUBCUTANEOUS at 22:29

## 2018-12-01 RX ADMIN — Medication 3 MILLILITER(S): at 06:48

## 2018-12-01 RX ADMIN — Medication 8: at 08:59

## 2018-12-01 RX ADMIN — Medication 1 DROP(S): at 06:47

## 2018-12-01 NOTE — PROGRESS NOTE ADULT - SUBJECTIVE AND OBJECTIVE BOX
Follow-up Pulm Progress Note    The patient was seen and examined. Notes reviewed and discussed with staff/team as applicable      Awake and alert. Still with a lot of dyspnea with exertion.     Denies: Chest pain, increased cough, colored phlegm, hemoptysis, N/V/D, neck stiffness, dysuria  ROS weakness    Vital Signs Last 24 Hrs  T(C): 37.1 (01 Dec 2018 06:46), Max: 37.1 (01 Dec 2018 06:46)  T(F): 98.8 (01 Dec 2018 06:46), Max: 98.8 (01 Dec 2018 06:46)  HR: 89 (01 Dec 2018 06:46) (84 - 108)  BP: 129/79 (01 Dec 2018 06:46) (129/79 - 155/81)  BP(mean): --  RR: 20 (01 Dec 2018 06:46) (20 - 20)  SpO2: 98% (01 Dec 2018 06:46) (94% - 98%)          11-30 @ 07:01  -  12-01 @ 07:00  --------------------------------------------------------  IN: 1080 mL / OUT: 0 mL / NET: 1080 mL          Medications:  MEDICATIONS  (STANDING):  ALBUTerol/ipratropium for Nebulization 3 milliLiter(s) Nebulizer <User Schedule>  artificial tears (preservative free) Ophthalmic Solution 1 Drop(s) Both EYES three times a day  aspirin enteric coated 81 milliGRAM(s) Oral daily  buDESOnide   0.5 milliGRAM(s) Respule 0.5 milliGRAM(s) Inhalation every 12 hours  cholecalciferol 2000 Unit(s) Oral daily  dextrose 5%. 1000 milliLiter(s) (50 mL/Hr) IV Continuous <Continuous>  dextrose 50% Injectable 12.5 Gram(s) IV Push once  dextrose 50% Injectable 25 Gram(s) IV Push once  dextrose 50% Injectable 25 Gram(s) IV Push once  enoxaparin Injectable 40 milliGRAM(s) SubCutaneous every 24 hours  guaiFENesin ER 1200 milliGRAM(s) Oral every 12 hours  insulin lispro (HumaLOG) corrective regimen sliding scale   SubCutaneous three times a day before meals  insulin lispro (HumaLOG) corrective regimen sliding scale   SubCutaneous at bedtime  isosorbide   mononitrate ER Tablet (IMDUR) 30 milliGRAM(s) Oral daily  methylPREDNISolone sodium succinate Injectable 40 milliGRAM(s) IV Push every 8 hours  montelukast 10 milliGRAM(s) Oral daily  multivitamin 1 Tablet(s) Oral daily  olmesartan 20 milliGRAM(s) Oral daily  rosuvastatin 10 milliGRAM(s) Oral at bedtime  theophylline ER Capsule 400 milliGRAM(s) Oral daily    MEDICATIONS  (PRN):  dextrose 40% Gel 15 Gram(s) Oral once PRN Blood Glucose LESS THAN 70 milliGRAM(s)/deciliter  glucagon  Injectable 1 milliGRAM(s) IntraMuscular once PRN Glucose LESS THAN 70 milligrams/deciliter      Allergies    No Known Allergies      Physical Examination:    Pleasant female, on O2, NAD  Neck: no JVD, LAD, accessory muscle use  PULM: Decreased BS diffusely  CVS: Regular rate and rhythm, S1S2, no murmurs, rubs, or gallops  Abdomen: soft, NT  Extremities: no edema  Neuro: non focal      LABS:                        11.8   8.70  )-----------( 213      ( 30 Nov 2018 07:58 )             37.2     12-01    139  |  92<L>  |  34<H>  ----------------------------<  321<H>  5.7<H>   |  35<H>  |  1.35<H>    Ca    10.1      01 Dec 2018 07:12  Phos  4.9     11-30  Mg     2.2     11-30    TPro  6.4  /  Alb  3.8  /  TBili  0.2  /  DBili  x   /  AST  9<L>  /  ALT  19  /  AlkPhos  53  11-30          CAPILLARY BLOOD GLUCOSE      POCT Blood Glucose.: 304 mg/dL (30 Nov 2018 22:07)

## 2018-12-01 NOTE — PROGRESS NOTE ADULT - PROBLEM SELECTOR PLAN 2
- increasing oxygen requirements now up to 4LNC, reportedly desat to 70s on RA   but not recorded on flowsheets.   -c/w copd mgt as above, wean NC back to baseline 2-3 L, maintain sat > 88%

## 2018-12-01 NOTE — PROGRESS NOTE ADULT - ASSESSMENT
ASSESSMENT:    acute on chronic hypoxic and hypercapnic respiratory failure due to very severe COPD with exacerbation - adequate oxygenation on nasal canula in the setting of profound dyspnea suggests that alterations in the mechanics of breathing due to lung hyperinflation are playing a significant role in shortness of breath    HTN/HLD/DM    CAD s/p PCI    PAD    PLAN/RECOMMENDATIONS:    oxygen supplementation to keep saturation greater than 92%  will benefit from a trilogy ventilator - I will reach out to [facility] for after d/c  albuterol/atrovent nebs q4h holding 2AM dose  pulmicort 0.5mg nebs q12h  singulair/theophylline/mucinex  solumedrol 40mg IVP q8h - glucose control on steroids  cardiac meds: ASA/crestor/imdur/olmesartan  DVT prophylaxais - SQ lovenox  outpatient evaluation ongoing for lung transplantation  continue outpatient pulmonary rehabilitation    Shaka Sharma MD  Pulmonary Medicine  [phone]

## 2018-12-01 NOTE — PROGRESS NOTE ADULT - SUBJECTIVE AND OBJECTIVE BOX
Patient is a 58y old  Female who presents with a chief complaint of dyspnea (01 Dec 2018 08:33)      SUBJECTIVE / OVERNIGHT EVENTS:  Patient still complaining of severe fatigue and shortness of breath upon ambulation.  Required ATC O2 support.    MEDICATIONS  (STANDING):  ALBUTerol/ipratropium for Nebulization 3 milliLiter(s) Nebulizer <User Schedule>  artificial tears (preservative free) Ophthalmic Solution 1 Drop(s) Both EYES three times a day  aspirin enteric coated 81 milliGRAM(s) Oral daily  buDESOnide   0.5 milliGRAM(s) Respule 0.5 milliGRAM(s) Inhalation every 12 hours  cholecalciferol 2000 Unit(s) Oral daily  dextrose 5%. 1000 milliLiter(s) (50 mL/Hr) IV Continuous <Continuous>  dextrose 50% Injectable 12.5 Gram(s) IV Push once  dextrose 50% Injectable 25 Gram(s) IV Push once  dextrose 50% Injectable 25 Gram(s) IV Push once  enoxaparin Injectable 40 milliGRAM(s) SubCutaneous every 24 hours  guaiFENesin ER 1200 milliGRAM(s) Oral every 12 hours  insulin lispro (HumaLOG) corrective regimen sliding scale   SubCutaneous three times a day before meals  insulin lispro (HumaLOG) corrective regimen sliding scale   SubCutaneous at bedtime  isosorbide   mononitrate ER Tablet (IMDUR) 30 milliGRAM(s) Oral daily  methylPREDNISolone sodium succinate Injectable 40 milliGRAM(s) IV Push every 8 hours  montelukast 10 milliGRAM(s) Oral daily  multivitamin 1 Tablet(s) Oral daily  olmesartan 20 milliGRAM(s) Oral daily  rosuvastatin 10 milliGRAM(s) Oral at bedtime  theophylline ER Capsule 400 milliGRAM(s) Oral daily    MEDICATIONS  (PRN):  dextrose 40% Gel 15 Gram(s) Oral once PRN Blood Glucose LESS THAN 70 milliGRAM(s)/deciliter  glucagon  Injectable 1 milliGRAM(s) IntraMuscular once PRN Glucose LESS THAN 70 milligrams/deciliter      CAPILLARY BLOOD GLUCOSE      POCT Blood Glucose.: 303 mg/dL (01 Dec 2018 08:42)  POCT Blood Glucose.: 304 mg/dL (30 Nov 2018 22:07)  POCT Blood Glucose.: 233 mg/dL (30 Nov 2018 17:11)  POCT Blood Glucose.: 196 mg/dL (30 Nov 2018 12:32)    I&O's Summary    30 Nov 2018 07:01  -  01 Dec 2018 07:00  --------------------------------------------------------  IN: 1080 mL / OUT: 0 mL / NET: 1080 mL        PHYSICAL EXAM:  Vital Signs Last 24 Hrs  T(C): 37.1 (01 Dec 2018 06:46), Max: 37.1 (01 Dec 2018 06:46)  T(F): 98.8 (01 Dec 2018 06:46), Max: 98.8 (01 Dec 2018 06:46)  HR: 89 (01 Dec 2018 06:46) (84 - 108)  BP: 129/79 (01 Dec 2018 06:46) (129/79 - 155/81)  BP(mean): --  RR: 20 (01 Dec 2018 06:46) (20 - 20)  SpO2: 98% (01 Dec 2018 06:46) (94% - 98%)  GENERAL: NAD, well-developed  HEAD:  Atraumatic, Normocephalic  EYES: EOMI, PERRLA, conjunctiva and sclera clear  NECK: Supple, No JVD  CHEST/LUNG: Poor air movement, minimal wheezing. No ronchi  HEART: Regular rate and rhythm; No murmurs, rubs, or gallops  ABDOMEN: Soft, Nontender, Nondistended; Bowel sounds present  EXTREMITIES:  2+ Peripheral Pulses, No clubbing, cyanosis, or edema  PSYCH: AAOx3  NEUROLOGY: non-focal  SKIN: No rashes or lesions    LABS:                        11.8   8.70  )-----------( 213      ( 30 Nov 2018 07:58 )             37.2     12-01    139  |  92<L>  |  34<H>  ----------------------------<  321<H>  5.7<H>   |  35<H>  |  1.35<H>    Ca    10.1      01 Dec 2018 07:12  Phos  4.9     11-30  Mg     2.2     11-30    TPro  6.4  /  Alb  3.8  /  TBili  0.2  /  DBili  x   /  AST  9<L>  /  ALT  19  /  AlkPhos  53  11-30              RADIOLOGY & ADDITIONAL TESTS:    Imaging Personally Reviewed:    Consultant(s) Notes Reviewed:      Care Discussed with Consultants/Other Providers:

## 2018-12-01 NOTE — PROGRESS NOTE ADULT - PROBLEM SELECTOR PLAN 1
- RVP negative  - c/w solumedrol 40 q8 hours  - hold further abx as no purulent sputum and already s/p 7 d of appropriate abx  - c/w symbicort, hold spiriva for acute exacerbation and start duoneb atc to avoid   dual anticholinergic effects, can transition back to spiriva upon discharge.  - c/w theophylline, singulair  - Pulmonary recommendations appreciated.

## 2018-12-01 NOTE — PROGRESS NOTE ADULT - PROBLEM SELECTOR PLAN 3
-hold oral hypoglycemics for now, given AIC 7.2, consider uptitrated metformin to 1000mg po bid on discharge  - moderate HISS  - cc diet  - uncontrolled on higher Solumedrol dose.

## 2018-12-02 DIAGNOSIS — I10 ESSENTIAL (PRIMARY) HYPERTENSION: ICD-10-CM

## 2018-12-02 DIAGNOSIS — E78.5 HYPERLIPIDEMIA, UNSPECIFIED: ICD-10-CM

## 2018-12-02 LAB
ANION GAP SERPL CALC-SCNC: 10 MMOL/L — SIGNIFICANT CHANGE UP (ref 5–17)
BUN SERPL-MCNC: 39 MG/DL — HIGH (ref 7–23)
CALCIUM SERPL-MCNC: 10.2 MG/DL — SIGNIFICANT CHANGE UP (ref 8.4–10.5)
CHLORIDE SERPL-SCNC: 94 MMOL/L — LOW (ref 96–108)
CO2 SERPL-SCNC: 36 MMOL/L — HIGH (ref 22–31)
CREAT SERPL-MCNC: 1.34 MG/DL — HIGH (ref 0.5–1.3)
GLUCOSE BLDC GLUCOMTR-MCNC: 232 MG/DL — HIGH (ref 70–99)
GLUCOSE BLDC GLUCOMTR-MCNC: 244 MG/DL — HIGH (ref 70–99)
GLUCOSE BLDC GLUCOMTR-MCNC: 251 MG/DL — HIGH (ref 70–99)
GLUCOSE BLDC GLUCOMTR-MCNC: 288 MG/DL — HIGH (ref 70–99)
GLUCOSE SERPL-MCNC: 293 MG/DL — HIGH (ref 70–99)
POTASSIUM SERPL-MCNC: 5.6 MMOL/L — HIGH (ref 3.5–5.3)
POTASSIUM SERPL-SCNC: 5.6 MMOL/L — HIGH (ref 3.5–5.3)
SODIUM SERPL-SCNC: 140 MMOL/L — SIGNIFICANT CHANGE UP (ref 135–145)

## 2018-12-02 PROCEDURE — 99233 SBSQ HOSP IP/OBS HIGH 50: CPT

## 2018-12-02 PROCEDURE — 99255 IP/OBS CONSLTJ NEW/EST HI 80: CPT

## 2018-12-02 RX ORDER — INSULIN GLARGINE 100 [IU]/ML
14 INJECTION, SOLUTION SUBCUTANEOUS AT BEDTIME
Qty: 0 | Refills: 0 | Status: DISCONTINUED | OUTPATIENT
Start: 2018-12-02 | End: 2018-12-02

## 2018-12-02 RX ORDER — INSULIN GLARGINE 100 [IU]/ML
12 INJECTION, SOLUTION SUBCUTANEOUS AT BEDTIME
Qty: 0 | Refills: 0 | Status: DISCONTINUED | OUTPATIENT
Start: 2018-12-02 | End: 2018-12-03

## 2018-12-02 RX ORDER — INSULIN LISPRO 100/ML
6 VIAL (ML) SUBCUTANEOUS
Qty: 0 | Refills: 0 | Status: DISCONTINUED | OUTPATIENT
Start: 2018-12-02 | End: 2018-12-03

## 2018-12-02 RX ORDER — INSULIN LISPRO 100/ML
8 VIAL (ML) SUBCUTANEOUS
Qty: 0 | Refills: 0 | Status: DISCONTINUED | OUTPATIENT
Start: 2018-12-02 | End: 2018-12-02

## 2018-12-02 RX ADMIN — Medication 0.5 MILLIGRAM(S): at 17:49

## 2018-12-02 RX ADMIN — Medication 1 DROP(S): at 06:15

## 2018-12-02 RX ADMIN — Medication 3 MILLILITER(S): at 10:18

## 2018-12-02 RX ADMIN — Medication 3 MILLILITER(S): at 06:24

## 2018-12-02 RX ADMIN — ISOSORBIDE MONONITRATE 30 MILLIGRAM(S): 60 TABLET, EXTENDED RELEASE ORAL at 12:39

## 2018-12-02 RX ADMIN — Medication 1 DROP(S): at 22:28

## 2018-12-02 RX ADMIN — Medication 1 SPRAY(S): at 22:28

## 2018-12-02 RX ADMIN — Medication 0.5 MILLIGRAM(S): at 06:23

## 2018-12-02 RX ADMIN — MONTELUKAST 10 MILLIGRAM(S): 4 TABLET, CHEWABLE ORAL at 12:40

## 2018-12-02 RX ADMIN — INSULIN GLARGINE 12 UNIT(S): 100 INJECTION, SOLUTION SUBCUTANEOUS at 22:29

## 2018-12-02 RX ADMIN — Medication 1 TABLET(S): at 12:39

## 2018-12-02 RX ADMIN — ROSUVASTATIN CALCIUM 10 MILLIGRAM(S): 5 TABLET ORAL at 22:28

## 2018-12-02 RX ADMIN — Medication 6 UNIT(S): at 17:52

## 2018-12-02 RX ADMIN — Medication 4: at 12:41

## 2018-12-02 RX ADMIN — Medication 6: at 17:50

## 2018-12-02 RX ADMIN — Medication 3 MILLILITER(S): at 14:59

## 2018-12-02 RX ADMIN — Medication 40 MILLIGRAM(S): at 22:28

## 2018-12-02 RX ADMIN — OLMESARTAN MEDOXOMIL 20 MILLIGRAM(S): 5 TABLET, FILM COATED ORAL at 06:19

## 2018-12-02 RX ADMIN — Medication 40 MILLIGRAM(S): at 06:14

## 2018-12-02 RX ADMIN — Medication 400 MILLIGRAM(S): at 12:40

## 2018-12-02 RX ADMIN — Medication 81 MILLIGRAM(S): at 12:40

## 2018-12-02 RX ADMIN — ENOXAPARIN SODIUM 40 MILLIGRAM(S): 100 INJECTION SUBCUTANEOUS at 22:29

## 2018-12-02 RX ADMIN — Medication 1200 MILLIGRAM(S): at 17:51

## 2018-12-02 RX ADMIN — Medication 1 DROP(S): at 15:00

## 2018-12-02 RX ADMIN — Medication 6: at 08:57

## 2018-12-02 RX ADMIN — Medication 2000 UNIT(S): at 12:40

## 2018-12-02 RX ADMIN — Medication 1200 MILLIGRAM(S): at 06:23

## 2018-12-02 RX ADMIN — Medication 3 MILLILITER(S): at 17:49

## 2018-12-02 RX ADMIN — Medication 3 MILLILITER(S): at 22:27

## 2018-12-02 RX ADMIN — Medication 40 MILLIGRAM(S): at 14:59

## 2018-12-02 RX ADMIN — Medication 0: at 22:28

## 2018-12-02 NOTE — PROGRESS NOTE ADULT - ASSESSMENT
ASSESSMENT:    acute on chronic hypoxic and hypercapnic respiratory failure due to very severe COPD with exacerbation - adequate oxygenation on nasal canula in the setting of profound dyspnea suggests that alterations in the mechanics of breathing due to lung hyperinflation are playing a significant role in shortness of breath    HTN/HLD/DM    CAD s/p PCI    PAD    PLAN/RECOMMENDATIONS:    oxygen supplementation to keep saturation greater than 92%  will benefit from a trilogy ventilator - [facility] for after d/c  albuterol/atrovent nebs q4h holding 2AM dose  pulmicort 0.5mg nebs q12h  singulair/theophylline/mucinex  solumedrol 40mg IVP q8h - glucose control on steroids  cardiac meds: ASA/crestor/imdur/olmesartan  DVT prophylaxais - SQ lovenox  outpatient evaluation ongoing for lung transplantation  continue outpatient pulmonary rehabilitation    Shaka Sharma MD  Pulmonary Medicine  [phone]

## 2018-12-02 NOTE — CONSULT NOTE ADULT - ATTENDING COMMENTS
Agree with assessment and plan as above by Dr. Richardson. Reviewed all pertinent labs, glucose values, and imaging studies. Modifications made as indicated above.   58 y F with relatively controlled Type 2 DM, a1C 7.1%, COPD on home O2 constantly, pending lung transplant listing at [facility], CAD s/p cardiac stents, pHTN, PVD, presenting to hospital for COPD exacerbation.  Currently on solumedrol 40mg q8 hours.    Will start basal bolus insulin as detailed in note above.   BP goal 130/80.  Continue with statin from home.       Dony Metz MD  [phone]

## 2018-12-02 NOTE — PROGRESS NOTE ADULT - SUBJECTIVE AND OBJECTIVE BOX
Follow-up Pulm Progress Note    The patient was seen and examined. Notes reviewed and discussed with staff/team as applicable      No new respiratory events overnight. Still very dyspneic walking. Better in bed.    Denies:  Chest pain, increased cough, colored phlegm, hemoptysis, N/V/D, neck stiffness, dysuria      Vital Signs Last 24 Hrs  T(C): 36.5 (02 Dec 2018 06:03), Max: 36.8 (01 Dec 2018 20:02)  T(F): 97.7 (02 Dec 2018 06:03), Max: 98.2 (01 Dec 2018 20:02)  HR: 96 (02 Dec 2018 06:03) (83 - 102)  BP: 128/84 (02 Dec 2018 06:03) (128/75 - 145/85)  BP(mean): --  RR: 22 (02 Dec 2018 06:13) (18 - 22)  SpO2: 95% (02 Dec 2018 06:13) (94% - 98%)          12-01 @ 07:01  -  12-02 @ 07:00  --------------------------------------------------------  IN: 1440 mL / OUT: 0 mL / NET: 1440 mL          Medications:  MEDICATIONS  (STANDING):  ALBUTerol/ipratropium for Nebulization 3 milliLiter(s) Nebulizer <User Schedule>  artificial tears (preservative free) Ophthalmic Solution 1 Drop(s) Both EYES three times a day  aspirin enteric coated 81 milliGRAM(s) Oral daily  buDESOnide   0.5 milliGRAM(s) Respule 0.5 milliGRAM(s) Inhalation every 12 hours  cholecalciferol 2000 Unit(s) Oral daily  dextrose 5%. 1000 milliLiter(s) (50 mL/Hr) IV Continuous <Continuous>  dextrose 50% Injectable 12.5 Gram(s) IV Push once  dextrose 50% Injectable 25 Gram(s) IV Push once  dextrose 50% Injectable 25 Gram(s) IV Push once  enoxaparin Injectable 40 milliGRAM(s) SubCutaneous every 24 hours  guaiFENesin ER 1200 milliGRAM(s) Oral every 12 hours  insulin lispro (HumaLOG) corrective regimen sliding scale   SubCutaneous three times a day before meals  insulin lispro (HumaLOG) corrective regimen sliding scale   SubCutaneous at bedtime  isosorbide   mononitrate ER Tablet (IMDUR) 30 milliGRAM(s) Oral daily  methylPREDNISolone sodium succinate Injectable 40 milliGRAM(s) IV Push every 8 hours  montelukast 10 milliGRAM(s) Oral daily  multivitamin 1 Tablet(s) Oral daily  olmesartan 20 milliGRAM(s) Oral daily  rosuvastatin 10 milliGRAM(s) Oral at bedtime  theophylline ER Capsule 400 milliGRAM(s) Oral daily    MEDICATIONS  (PRN):  dextrose 40% Gel 15 Gram(s) Oral once PRN Blood Glucose LESS THAN 70 milliGRAM(s)/deciliter  glucagon  Injectable 1 milliGRAM(s) IntraMuscular once PRN Glucose LESS THAN 70 milligrams/deciliter  sodium chloride 0.65% Nasal 1 Spray(s) Both Nostrils two times a day PRN Nasal Congestion      Allergies    No Known Allergies    Physical Examination:    White female, in bed, no distress  Neck: no JVD, LAD, accessory muscle use  PULM: Decreased BS diffusely  CVS: Regular rate and rhythm, S1S2, no murmurs, rubs, or gallops  Abdomen: soft, NT  Extremities: no edema  Neuro: non focal      LABS:    12-02    140  |  94<L>  |  39<H>  ----------------------------<  293<H>  5.6<H>   |  36<H>  |  1.34<H>    Ca    10.2      02 Dec 2018 07:25            CAPILLARY BLOOD GLUCOSE      POCT Blood Glucose.: 342 mg/dL (01 Dec 2018 21:39)

## 2018-12-02 NOTE — PROGRESS NOTE ADULT - PROBLEM SELECTOR PLAN 3
- Hold oral hypoglycemics for now, given AIC 7.2, consider uptitrated metformin to   1000mg po bid on discharge  - moderate HISS  - cc diet  - uncontrolled on higher Solumedrol dose.  - Endocrinology evaluation.

## 2018-12-02 NOTE — DIETITIAN INITIAL EVALUATION ADULT. - OTHER INFO
Patient seen for consult for A1c 7.2.  patient reports to be eating OK but admits to getting full easily.  PTA, patient ate small meals, somewhat unbalanced in CHO and Pro.  Patient does not feel that she is a diabetic but we did discuss the effects of steroids on her glycemic control as well as bone and muscular health.  Patient receptive to diabetes education and the importance of maintaining muscle and strength.  Patient was supplementing Vit D3, multivitamin and Co Q10 at home.  Suggested she speak with her MD regarding calcium.   Patient is trying to lose weight for potential lung transplant.  Advised on tips to improve overall nutritional status.  Patient denies any GI complaints.

## 2018-12-02 NOTE — CONSULT NOTE ADULT - ASSESSMENT
58 year old F with controlled DM at home on metformin with HbA1c 7.1 c/b CAD and possible nephropathy admitted for COPD exacerbation. BG currently above goal 2/2 steroid induced hyperglycemia.

## 2018-12-02 NOTE — CONSULT NOTE ADULT - PROBLEM SELECTOR RECOMMENDATION 9
While inpatient - c/w Lantus sq QHS 14 units plus 8 units Humalog sq pre meals   c/w moderate dose corrective insulin scale at bedtime and before meals  pt will need adjustment in current insulin regimen if current steroid dose changes  c/w low carb diet  Goal HbA1c- 6.5-7.0 , recent HbA1c - 7.1  Goal -180 while inpatient  Upon dc - pt will continue with oral medications, can not increase current dose metformin ( 500 mg once or twice a day) given low EGFR, final plan TBD.  Pt will follow up with PCP While inpatient - c/w Lantus sq QHS 12 units plus 6 units Humalog sq pre meals   c/w moderate dose corrective insulin scale at bedtime and before meals  pt will need adjustment in current insulin regimen if current steroid dose changes  c/w low carb diet  Goal HbA1c- 6.5-7.0 , recent HbA1c - 7.1  Goal -180 while inpatient  Upon dc - pt will continue with oral medications, can not increase current dose metformin ( 500 mg once or twice a day) given low EGFR, final plan TBD.  Pt will follow up with PCP

## 2018-12-02 NOTE — PROGRESS NOTE ADULT - PROBLEM SELECTOR PLAN 1
- No significant improvement.   - RVP negative  - c/w solumedrol 40 q8 hours  - hold further abx as no purulent sputum and already s/p 7 d of appropriate abx  - c/w symbicort, hold spiriva for acute exacerbation and start duoneb atc to avoid     dual anticholinergic effects, can transition back to spiriva upon discharge.  - Trilogy evaluation as per Pulm.  - c/w theophylline, singulair  - Pulmonary recommendations appreciated.

## 2018-12-02 NOTE — PROGRESS NOTE ADULT - PROBLEM SELECTOR PLAN 2
- Increasing oxygen requirements now up to 4LNC.   - c/w copd mgt as above, wean NC back to baseline 2-3 L, maintain sat > 88%

## 2018-12-02 NOTE — DIETITIAN INITIAL EVALUATION ADULT. - NS AS NUTRI INTERV ED CONTENT
Recommended modifications/Discussed consistent CHO diet education.  Reviewed foods containing CHO, portion sizes of CHO, CHO counting, pairing CHO with proteins, reading food labels, importance of monitoring blood glucose levels, importance of not skipping meals. Reviewed signs and symptoms of hypoglycemia and how to correct. Reviewed meal and snack options. Pt verbalized understanding and accepted written T2DM Nutrition Therapy diet education . RD remains available for diet education review.  Also advised on need for protein and fluids.  Patient able to teach back points./Purpose of the nutrition education

## 2018-12-02 NOTE — DIETITIAN INITIAL EVALUATION ADULT. - PROBLEM SELECTOR PLAN 1
-dyspnea, poor air entry b/l, inc sputum, respiratory acidosis, increasing oxygen requirement and rescue inhaler use. s/p o/p oral steroid taper and 7 day course of biaxin without improvement  -s/p solumedrol and nebs in ER with improvement  -c/w solumedrol 40 q8  -hold further abx as no purulent sputum and already s/p 7 d of appropriate abx  -c/w symbicort, hold spiriva for acute exacerbation and start duoneb atc to avoid dual anticholinergic effects, can transition back to spiriva upon discharge.  - c/w theophylline, singulair  -check RVP  -Pulm eval in am  -bedside spirometry  -If pt decompensates, would start bipap and check abg, but for now appears clinically improved.  -ddx also includes but not limited to PE given sinus tachy, increased hypoxia/dyspnea; however no obj s/s of dvt, and pt's clinical presentation more c/w copd exac at this time.  IF no improvement despite IV steroids would consider CTA to r/o PE.

## 2018-12-02 NOTE — DIETITIAN INITIAL EVALUATION ADULT. - PERTINENT MEDS FT
MEDICATIONS  (STANDING):  ALBUTerol/ipratropium for Nebulization 3 milliLiter(s) Nebulizer <User Schedule>  artificial tears (preservative free) Ophthalmic Solution 1 Drop(s) Both EYES three times a day  aspirin enteric coated 81 milliGRAM(s) Oral daily  buDESOnide   0.5 milliGRAM(s) Respule 0.5 milliGRAM(s) Inhalation every 12 hours  cholecalciferol 2000 Unit(s) Oral daily  dextrose 5%. 1000 milliLiter(s) (50 mL/Hr) IV Continuous <Continuous>  dextrose 50% Injectable 12.5 Gram(s) IV Push once  dextrose 50% Injectable 25 Gram(s) IV Push once  dextrose 50% Injectable 25 Gram(s) IV Push once  enoxaparin Injectable 40 milliGRAM(s) SubCutaneous every 24 hours  guaiFENesin ER 1200 milliGRAM(s) Oral every 12 hours  insulin lispro (HumaLOG) corrective regimen sliding scale   SubCutaneous three times a day before meals  insulin lispro (HumaLOG) corrective regimen sliding scale   SubCutaneous at bedtime  isosorbide   mononitrate ER Tablet (IMDUR) 30 milliGRAM(s) Oral daily  methylPREDNISolone sodium succinate Injectable 40 milliGRAM(s) IV Push every 8 hours  montelukast 10 milliGRAM(s) Oral daily  multivitamin 1 Tablet(s) Oral daily  olmesartan 20 milliGRAM(s) Oral daily  rosuvastatin 10 milliGRAM(s) Oral at bedtime  theophylline ER Capsule 400 milliGRAM(s) Oral daily    MEDICATIONS  (PRN):  dextrose 40% Gel 15 Gram(s) Oral once PRN Blood Glucose LESS THAN 70 milliGRAM(s)/deciliter  glucagon  Injectable 1 milliGRAM(s) IntraMuscular once PRN Glucose LESS THAN 70 milligrams/deciliter  sodium chloride 0.65% Nasal 1 Spray(s) Both Nostrils two times a day PRN Nasal Congestion

## 2018-12-02 NOTE — PHYSICAL THERAPY INITIAL EVALUATION ADULT - BALANCE TRAINING, PT EVAL
pt will demonstrate good balance to improve safety, and decrease LOB during functional mobility in 2weeks

## 2018-12-02 NOTE — DIETITIAN INITIAL EVALUATION ADULT. - ENERGY NEEDS
Ht 64 inches,  pounds,  pounds +/- 10%, BMI 33.5  Dxd with COPD exacerbation, need for IV steroids. Awaiting possible lung transplant list.  Skin intact, no edema.  Generally well nourished but weakened.

## 2018-12-02 NOTE — PHYSICAL THERAPY INITIAL EVALUATION ADULT - STRENGTHENING, PT EVAL
pt will demonstrate good strength in BLEs to improve limb stability during functional mobility in 2weeks

## 2018-12-02 NOTE — PHYSICAL THERAPY INITIAL EVALUATION ADULT - PERTINENT HX OF CURRENT PROBLEM, REHAB EVAL
58 F PMH COPD on home O2 3L, HTN, HLD, CAD s/p x6 stents, T2DM, pHTN, PVD, p/w progressive worsening dyspnea x 2 weeks, dx w/ exacerbation of COPD

## 2018-12-02 NOTE — DIETITIAN INITIAL EVALUATION ADULT. - WEIGHT IN LBS
195
I personally evaluated the patient. I reviewed the Resident’s or Physician Assistant’s note (as assigned above), and agree with the findings and plan except as documented in my note.    34 y/o F w hx of ETOH abuse presented after [place] episode of alter[place] the park. No SI or HI. Patient is alert, oriented. Walking in a straight line in the ED. She has a place to go upon discharge. Patient was discharged from ED in stable condition.     CONSTITUTIONAL: Well-developed; well-nourished; in no acute distress. Sitting up and providing appropriate history and physical examination  SKIN: skin exam is warm and dry, no acute rash.  HEAD: Normocephalic; atraumatic.  EYES: PERRL, 3 mm bilateral, no nystagmus, EOM intact; conjunctiva and sclera clear.  ENT: No nasal discharge; airway clear.  NECK: Supple; non tender.+ full passive ROM in all directions. No JVD  CARD: S1, S2 normal; no murmurs, gallops, or rubs. Regular rate and rhythm. + Symmetric Strong Pulses  RESP: No wheezes, rales or rhonchi. Good air movement bilaterally  ABD: soft; non-distended; non-tender. No Rebound, No Gaurding, No signs of peritnitis, No CVA tenderness  EXT: Normal ROM. No clubbing, cyanosis or edema.   NEURO: CN 2-12 intact, normal finger to nose, normal romberg, stable gait, no sensory or motor deficits, Alert, oriented, grossly unremarkable. No Focal deficits. GCS 15. NIH 0    Patient discharged from ED in stable condition.

## 2018-12-02 NOTE — PROGRESS NOTE ADULT - SUBJECTIVE AND OBJECTIVE BOX
Patient is a 58y old  Female who presents with a chief complaint of dyspnea (02 Dec 2018 08:37)      SUBJECTIVE / OVERNIGHT EVENTS:  Remains extremely short of breath.   Limited to several steps prior to worsening shortness of breath.  Continues with O2 support.     MEDICATIONS  (STANDING):  ALBUTerol/ipratropium for Nebulization 3 milliLiter(s) Nebulizer <User Schedule>  artificial tears (preservative free) Ophthalmic Solution 1 Drop(s) Both EYES three times a day  aspirin enteric coated 81 milliGRAM(s) Oral daily  buDESOnide   0.5 milliGRAM(s) Respule 0.5 milliGRAM(s) Inhalation every 12 hours  cholecalciferol 2000 Unit(s) Oral daily  dextrose 5%. 1000 milliLiter(s) (50 mL/Hr) IV Continuous <Continuous>  dextrose 50% Injectable 12.5 Gram(s) IV Push once  dextrose 50% Injectable 25 Gram(s) IV Push once  dextrose 50% Injectable 25 Gram(s) IV Push once  enoxaparin Injectable 40 milliGRAM(s) SubCutaneous every 24 hours  guaiFENesin ER 1200 milliGRAM(s) Oral every 12 hours  insulin lispro (HumaLOG) corrective regimen sliding scale   SubCutaneous three times a day before meals  insulin lispro (HumaLOG) corrective regimen sliding scale   SubCutaneous at bedtime  isosorbide   mononitrate ER Tablet (IMDUR) 30 milliGRAM(s) Oral daily  methylPREDNISolone sodium succinate Injectable 40 milliGRAM(s) IV Push every 8 hours  montelukast 10 milliGRAM(s) Oral daily  multivitamin 1 Tablet(s) Oral daily  olmesartan 20 milliGRAM(s) Oral daily  rosuvastatin 10 milliGRAM(s) Oral at bedtime  theophylline ER Capsule 400 milliGRAM(s) Oral daily    MEDICATIONS  (PRN):  dextrose 40% Gel 15 Gram(s) Oral once PRN Blood Glucose LESS THAN 70 milliGRAM(s)/deciliter  glucagon  Injectable 1 milliGRAM(s) IntraMuscular once PRN Glucose LESS THAN 70 milligrams/deciliter  sodium chloride 0.65% Nasal 1 Spray(s) Both Nostrils two times a day PRN Nasal Congestion      CAPILLARY BLOOD GLUCOSE      POCT Blood Glucose.: 288 mg/dL (02 Dec 2018 08:50)  POCT Blood Glucose.: 342 mg/dL (01 Dec 2018 21:39)  POCT Blood Glucose.: 349 mg/dL (01 Dec 2018 17:53)  POCT Blood Glucose.: 233 mg/dL (01 Dec 2018 12:21)    I&O's Summary    01 Dec 2018 07:01  -  02 Dec 2018 07:00  --------------------------------------------------------  IN: 1440 mL / OUT: 0 mL / NET: 1440 mL        PHYSICAL EXAM:  Vital Signs Last 24 Hrs  T(C): 36.5 (02 Dec 2018 06:03), Max: 36.8 (01 Dec 2018 20:02)  T(F): 97.7 (02 Dec 2018 06:03), Max: 98.2 (01 Dec 2018 20:02)  HR: 96 (02 Dec 2018 06:03) (83 - 102)  BP: 128/84 (02 Dec 2018 06:03) (128/75 - 145/85)  BP(mean): --  RR: 22 (02 Dec 2018 06:13) (18 - 22)  SpO2: 95% (02 Dec 2018 06:13) (94% - 98%)  GENERAL: NAD, obese  HEAD:  Atraumatic, Normocephalic  EYES: EOMI, PERRLA, conjunctiva and sclera clear  NECK: Supple, No JVD  CHEST/LUNG: Decreased breath sounds bilaterally. Minor wheezing on Left side.   HEART: Regular rate and rhythm; No murmurs, rubs, or gallops  ABDOMEN: Soft, Nontender, Nondistended; Bowel sounds present  EXTREMITIES:  2+ Peripheral Pulses, No clubbing, cyanosis, or edema  PSYCH: AAOx3  NEUROLOGY: non-focal  SKIN: No rashes or lesions    LABS:    12-02    140  |  94<L>  |  39<H>  ----------------------------<  293<H>  5.6<H>   |  36<H>  |  1.34<H>    Ca    10.2      02 Dec 2018 07:25                RADIOLOGY & ADDITIONAL TESTS:    Imaging Personally Reviewed:    Consultant(s) Notes Reviewed:      Care Discussed with Consultants/Other Providers:

## 2018-12-02 NOTE — CONSULT NOTE ADULT - SUBJECTIVE AND OBJECTIVE BOX
HPI:  58 F PMH COPD on home O2 3L, HTN, HLD, CAD s/p x6 stents, T2DM, pHTN, PVD, p/w progressive worsening dyspnea x 2 weeks. Pt says she normally uses 3L NC atc at home. Infrequently leaves her house as of late. 2 wks ago, did leave her house a few times, once to go to pulm rehab, and felt much weaker than usual.  Has had increasing dyspnea and fatigue, worse with minimal exertion. Huffing/puffing for breath, sob with talking, showering, etc. +inc sputum volume production, pt says it is white in color, denies purulence. +inc O2 requirement now up to 4LNC at home during last 2 wks. +inc rescue inhaler use upto 7x/daily with minimal improvement.  Denies cough.  No significant inc in wheezing. +some runny nose but denies myalgias, sick contacts, sore throat; +flu shot this year.  Pt denies cp, denies f/c, denies n/v/d, denies orthopnea or significant increase in LE edema. Pt saw her pcp/pulm Dr. Mathew who started her on 10 d course of biaxin and prednisone 60 mg qd, which she has tapered down to 30 mg. Pt has completed a full 7 days of biaxin. Was told to go to ER last week for eval but tried to manage at home, now her sx have progressed.     Of note pt was prev evaluated for lung transplant at [place], but during testing had NSTEMI requiring PCI x 6 in 2016, and was then on Effient for 1 year and recommended repeat testing/rehab prior to subsequent evaluation.     VS: 97.9, 108, 131/69, 22, 96% 3LNC.  Labs: leukocytosis 12.1 with neutrophil predominance, rest of cbc unrevealing, cmp with relatively stable CKD2/3, hyperglycemia, vbg with compensated respiratory acidosis and normal lactate. CXR prelim no acute findings, no infiltrate. In ER pt received duonebs x 3, solumedrol 125 x1 prior to medicine team involvement. Pt endorses improvement in dyspnea since treatment in ER. (29 Nov 2018 21:28)      PAST MEDICAL & SURGICAL HISTORY:  Hyperlipemia  Chronic sinusitis  Raynaud phenomenon  HTN (hypertension)  DM (diabetes mellitus)  Claustrophobia  COPD (chronic obstructive pulmonary disease)  S/P tonsillectomy      FAMILY HISTORY:  FH: MI (myocardial infarction) (Father)  FH: CABG (coronary artery bypass surgery) (Father)      Social History:  Tobacco  ETOH  Illicits  Occupation    Outpatient Medications:    MEDICATIONS  (STANDING):  ALBUTerol/ipratropium for Nebulization 3 milliLiter(s) Nebulizer <User Schedule>  artificial tears (preservative free) Ophthalmic Solution 1 Drop(s) Both EYES three times a day  aspirin enteric coated 81 milliGRAM(s) Oral daily  buDESOnide   0.5 milliGRAM(s) Respule 0.5 milliGRAM(s) Inhalation every 12 hours  cholecalciferol 2000 Unit(s) Oral daily  dextrose 5%. 1000 milliLiter(s) (50 mL/Hr) IV Continuous <Continuous>  dextrose 50% Injectable 12.5 Gram(s) IV Push once  dextrose 50% Injectable 25 Gram(s) IV Push once  dextrose 50% Injectable 25 Gram(s) IV Push once  enoxaparin Injectable 40 milliGRAM(s) SubCutaneous every 24 hours  guaiFENesin ER 1200 milliGRAM(s) Oral every 12 hours  insulin lispro (HumaLOG) corrective regimen sliding scale   SubCutaneous three times a day before meals  insulin lispro (HumaLOG) corrective regimen sliding scale   SubCutaneous at bedtime  isosorbide   mononitrate ER Tablet (IMDUR) 30 milliGRAM(s) Oral daily  methylPREDNISolone sodium succinate Injectable 40 milliGRAM(s) IV Push every 8 hours  montelukast 10 milliGRAM(s) Oral daily  multivitamin 1 Tablet(s) Oral daily  olmesartan 20 milliGRAM(s) Oral daily  rosuvastatin 10 milliGRAM(s) Oral at bedtime  theophylline ER Capsule 400 milliGRAM(s) Oral daily    MEDICATIONS  (PRN):  dextrose 40% Gel 15 Gram(s) Oral once PRN Blood Glucose LESS THAN 70 milliGRAM(s)/deciliter  glucagon  Injectable 1 milliGRAM(s) IntraMuscular once PRN Glucose LESS THAN 70 milligrams/deciliter  sodium chloride 0.65% Nasal 1 Spray(s) Both Nostrils two times a day PRN Nasal Congestion      Allergies    No Known Allergies    Intolerances      Review of Systems:  Constitutional: No fever, good appetite/po intake  Eyes: No blurry vision, diplopia  Neuro: No tremors  HEENT: No pain  Cardiovascular: No chest pain, palpitations  Respiratory: No SOB, no cough  GI: No nausea, vomiting,   : No dysuria, hematuria  Skin: no rash  Psych: no depression  Endocrine: no polyuria, polydipsia  Hem/lymph: no swelling  Osteoporosis: no fractures    ALL OTHER SYSTEMS REVIEWED AND NEGATIVE    UNABLE TO OBTAIN    PHYSICAL EXAM:  VITALS: T(C): 36.8 (12-02-18 @ 12:40)  T(F): 98.3 (12-02-18 @ 12:40), Max: 98.3 (12-02-18 @ 12:40)  HR: 92 (12-02-18 @ 12:40) (92 - 102)  BP: 139/82 (12-02-18 @ 12:40) (128/75 - 156/82)  RR:  (18 - 22)  SpO2:  (94% - 98%)  Wt(kg): --  GENERAL: NAD, well-groomed, well-developed  EYES: No proptosis, no lid lag, anicteric  HEENT:  Atraumatic, Normocephalic, moist mucous membranes  THYROID: Normal size, no palpable nodules  RESPIRATORY: Clear to auscultation bilaterally; No rales, rhonchi, wheezing, or rubs  CARDIOVASCULAR: Regular rate and rhythm; No murmurs; no peripheral edema  GI: Soft, nontender, non distended, normal bowel sounds  SKIN: Dry, intact, No rashes or lesions  NEURO: sensation intact, extraocular movements intact, no tremor, normal reflexes  PSYCH: reactive affect, euthymic mood  CUSHING'S SIGNS: no striae    POCT Blood Glucose.: 244 mg/dL (12-02-18 @ 12:39)  POCT Blood Glucose.: 288 mg/dL (12-02-18 @ 08:50)  POCT Blood Glucose.: 342 mg/dL (12-01-18 @ 21:39)  POCT Blood Glucose.: 349 mg/dL (12-01-18 @ 17:53)  POCT Blood Glucose.: 233 mg/dL (12-01-18 @ 12:21)  POCT Blood Glucose.: 303 mg/dL (12-01-18 @ 08:42)  POCT Blood Glucose.: 304 mg/dL (11-30-18 @ 22:07)  POCT Blood Glucose.: 233 mg/dL (11-30-18 @ 17:11)  POCT Blood Glucose.: 196 mg/dL (11-30-18 @ 12:32)  POCT Blood Glucose.: 278 mg/dL (11-30-18 @ 09:04)  POCT Blood Glucose.: 439 mg/dL (11-29-18 @ 23:35)  POCT Blood Glucose.: 423 mg/dL (11-29-18 @ 23:32)                            11.8   8.70  )-----------( 213      ( 30 Nov 2018 07:58 )             37.2       12-02    140  |  94<L>  |  39<H>  ----------------------------<  293<H>  5.6<H>   |  36<H>  |  1.34<H>    EGFR if : 50<L>  EGFR if non : 43<L>    Ca    10.2      12-02  Mg     2.2     11-30  Phos  4.9     11-30    TPro  6.4  /  Alb  3.8  /  TBili  0.2  /  DBili  x   /  AST  9<L>  /  ALT  19  /  AlkPhos  53  11-30      Thyroid Function Tests:      Hemoglobin A1C, Whole Blood: 7.2 % <H> [4.0 - 5.6] (11-30-18 @ 07:58)          Radiology:     A/P: yo male/female with hx of ................................... here with .......... HPI:  58 F PMH COPD on home O2 3L, HTN, HLD, CAD s/p x6 stents, T2DM, pHTN, PVD, p/w progressive worsening dyspnea x 2 weeks. Pt says she normally uses 3L NC atc at home. Infrequently leaves her house as of late. 2 wks ago, did leave her house a few times, once to go to pulm rehab, and felt much weaker than usual.  Has had increasing dyspnea and fatigue, worse with minimal exertion. Huffing/puffing for breath, sob with talking, showering, etc. +inc sputum volume production, pt says it is white in color, denies purulence. +inc O2 requirement now up to 4LNC at home during last 2 wks. +inc rescue inhaler use upto 7x/daily with minimal improvement.  Denies cough.  No significant inc in wheezing. +some runny nose but denies myalgias, sick contacts, sore throat; +flu shot this year.  Pt denies cp, denies f/c, denies n/v/d, denies orthopnea or significant increase in LE edema. Pt saw her pcp/pulm Dr. Mathew who started her on 10 d course of biaxin and prednisone 60 mg qd, which she has tapered down to 30 mg. Pt has completed a full 7 days of biaxin. Was told to go to ER last week for eval but tried to manage at home, now her sx have progressed. Of note pt was prev evaluated for lung transplant at [facility], but during testing had NSTEMI requiring PCI x 6 in 2016, and was then on Effient for 1 year and recommended repeat testing/rehab prior to subsequent evaluation.     VS: 97.9, 108, 131/69, 22, 96% 3LNC.  Labs: leukocytosis 12.1 with neutrophil predominance, rest of cbc unrevealing, cmp with relatively stable CKD2/3, hyperglycemia, vbg with compensated respiratory acidosis and normal lactate. CXR prelim no acute findings, no infiltrate. In ER pt received duonebs x 3, solumedrol 125 x1 prior to medicine team involvement. Pt endorses improvement in dyspnea since treatment in ER. (29 Nov 2018 21:28)    	  Endocrine Hx:  58 year old F was seen for T2DM with steroid induced hyperglycemia. Pt mentioned that she was diagnosed with prediabetes 4-5 years before for which she is only taking metformin 500 mg once a day orally.  Her HbA1c this admission is 7.1%. She mentioned that she doubles her metformin dose to 500 mg BID when she is on oral prednisone for her COPD, which she has been doing for last few weeks before presentation. She checks her BG 2-3 times a day. Her Avg number is 110-120s. Never gets hypo or hyperglycemia episodes at home. She current denies any hyperglycemia sx like polyuria or polydipsia. She denies any macro or microvascular complications associated with DM.  Though she has hx CAD w/stents and CKD vs [facility] as seen in current labs. She is not following any outpatient endocrinologist.     PAST MEDICAL & SURGICAL HISTORY:  Hyperlipemia  Chronic sinusitis  Raynaud phenomenon  HTN (hypertension)  DM (diabetes mellitus)  Claustrophobia  COPD (chronic obstructive pulmonary disease)  S/P tonsillectomy      FAMILY HISTORY:  FH: MI (myocardial infarction) (Father)  FH: CABG (coronary artery bypass surgery) (Father)  Brother - DM    Social History:  Tobacco - former smoker  ETOH- occasional etoh use  Illicits- denies drug use      MEDICATIONS  (STANDING):  ALBUTerol/ipratropium for Nebulization 3 milliLiter(s) Nebulizer <User Schedule>  artificial tears (preservative free) Ophthalmic Solution 1 Drop(s) Both EYES three times a day  aspirin enteric coated 81 milliGRAM(s) Oral daily  buDESOnide   0.5 milliGRAM(s) Respule 0.5 milliGRAM(s) Inhalation every 12 hours  cholecalciferol 2000 Unit(s) Oral daily  dextrose 5%. 1000 milliLiter(s) (50 mL/Hr) IV Continuous <Continuous>  dextrose 50% Injectable 12.5 Gram(s) IV Push once  dextrose 50% Injectable 25 Gram(s) IV Push once  dextrose 50% Injectable 25 Gram(s) IV Push once  enoxaparin Injectable 40 milliGRAM(s) SubCutaneous every 24 hours  guaiFENesin ER 1200 milliGRAM(s) Oral every 12 hours  insulin lispro (HumaLOG) corrective regimen sliding scale   SubCutaneous three times a day before meals  insulin lispro (HumaLOG) corrective regimen sliding scale   SubCutaneous at bedtime  isosorbide   mononitrate ER Tablet (IMDUR) 30 milliGRAM(s) Oral daily  methylPREDNISolone sodium succinate Injectable 40 milliGRAM(s) IV Push every 8 hours  montelukast 10 milliGRAM(s) Oral daily  multivitamin 1 Tablet(s) Oral daily  olmesartan 20 milliGRAM(s) Oral daily  rosuvastatin 10 milliGRAM(s) Oral at bedtime  theophylline ER Capsule 400 milliGRAM(s) Oral daily    MEDICATIONS  (PRN):  dextrose 40% Gel 15 Gram(s) Oral once PRN Blood Glucose LESS THAN 70 milliGRAM(s)/deciliter  glucagon  Injectable 1 milliGRAM(s) IntraMuscular once PRN Glucose LESS THAN 70 milligrams/deciliter  sodium chloride 0.65% Nasal 1 Spray(s) Both Nostrils two times a day PRN Nasal Congestion      Allergies  No Known Allergies    Review of Systems:  Constitutional: No fever, good appetite/po intake  Eyes: No blurry vision, diplopia  Neuro: No tremors  HEENT: No pain  Cardiovascu+SOB, +cough  GI: No nausea, vomiting,   : No dysuria, hematuria  Skin: no rash  Psych: no depression  Endocrine: no polyuria, polydipsia  Hem/lymph: no swelling  Osteoporosis: no fractures    ALL OTHER SYSTEMS REVIEWED AND NEGATIVE    PHYSICAL EXAM:  VITALS: T(C): 36.8 (12-02-18 @ 12:40)  T(F): 98.3 (12-02-18 @ 12:40), Max: 98.3 (12-02-18 @ 12:40)  HR: 92 (12-02-18 @ 12:40) (92 - 102)  BP: 139/82 (12-02-18 @ 12:40) (128/75 - 156/82)  RR:  (18 - 22)  SpO2:  (94% - 98%)  Wt(kg): -- 88.6  GENERAL: NAD, well-groomed, well-developed  EYES: No proptosis, no lid lag, anicteric  HEENT:  Atraumatic, Normocephalic, moist mucous membranes  THYROID: Normal size, no palpable nodules  RESPIRATORY:  LLL mild wheezing, R lung clear; No rales, rhonchi or rubs  CARDIOVASCULAR: Regular rate and rhythm; No murmurs; no peripheral edema  GI: Soft, nontender, non distended, normal bowel sounds  SKIN: Dry, intact, No rashes or lesions  NEURO: sensation intact, extraocular movements intact, no tremor, normal reflexes  PSYCH: reactive affect, euthymic mood  CUSHING'S SIGNS: no striae    POCT Blood Glucose.: 244 mg/dL (12-02-18 @ 12:39)  POCT Blood Glucose.: 288 mg/dL (12-02-18 @ 08:50)  POCT Blood Glucose.: 342 mg/dL (12-01-18 @ 21:39)  POCT Blood Glucose.: 349 mg/dL (12-01-18 @ 17:53)  POCT Blood Glucose.: 233 mg/dL (12-01-18 @ 12:21)  POCT Blood Glucose.: 303 mg/dL (12-01-18 @ 08:42)  POCT Blood Glucose.: 304 mg/dL (11-30-18 @ 22:07)  POCT Blood Glucose.: 233 mg/dL (11-30-18 @ 17:11)  POCT Blood Glucose.: 196 mg/dL (11-30-18 @ 12:32)  POCT Blood Glucose.: 278 mg/dL (11-30-18 @ 09:04)  POCT Blood Glucose.: 439 mg/dL (11-29-18 @ 23:35)  POCT Blood Glucose.: 423 mg/dL (11-29-18 @ 23:32)                            11.8   8.70  )-----------( 213      ( 30 Nov 2018 07:58 )             37.2       12-02    140  |  94<L>  |  39<H>  ----------------------------<  293<H>  5.6<H>   |  36<H>  |  1.34<H>    EGFR if : 50<L>  EGFR if non : 43<L>    Ca    10.2      12-02  Mg     2.2     11-30  Phos  4.9     11-30    TPro  6.4  /  Alb  3.8  /  TBili  0.2  /  DBili  x   /  AST  9<L>  /  ALT  19  /  AlkPhos  53  11-30    Hemoglobin A1C, Whole Blood: 7.2 % <H> [4.0 - 5.6] (11-30-18 @ 07:58)

## 2018-12-02 NOTE — PHYSICAL THERAPY INITIAL EVALUATION ADULT - DISCHARGE DISPOSITION, PT EVAL
home w/ home PT/Home w/assist of family and home PT to improve her strength, endurance, balance, gait and for home safety assessment

## 2018-12-02 NOTE — PHYSICAL THERAPY INITIAL EVALUATION ADULT - ADDITIONAL COMMENTS
As per the pt, she lives in a pvt house w/ brother, No MARY thru garage, independent in ADls and functional mobility, w/ O2, uses SC for ambulation.

## 2018-12-03 LAB
ANION GAP SERPL CALC-SCNC: 10 MMOL/L — SIGNIFICANT CHANGE UP (ref 5–17)
BUN SERPL-MCNC: 34 MG/DL — HIGH (ref 7–23)
CALCIUM SERPL-MCNC: 9.5 MG/DL — SIGNIFICANT CHANGE UP (ref 8.4–10.5)
CHLORIDE SERPL-SCNC: 94 MMOL/L — LOW (ref 96–108)
CO2 SERPL-SCNC: 33 MMOL/L — HIGH (ref 22–31)
CREAT SERPL-MCNC: 1.24 MG/DL — SIGNIFICANT CHANGE UP (ref 0.5–1.3)
GLUCOSE BLDC GLUCOMTR-MCNC: 134 MG/DL — HIGH (ref 70–99)
GLUCOSE BLDC GLUCOMTR-MCNC: 192 MG/DL — HIGH (ref 70–99)
GLUCOSE BLDC GLUCOMTR-MCNC: 230 MG/DL — HIGH (ref 70–99)
GLUCOSE BLDC GLUCOMTR-MCNC: 279 MG/DL — HIGH (ref 70–99)
GLUCOSE SERPL-MCNC: 322 MG/DL — HIGH (ref 70–99)
HCT VFR BLD CALC: 39.1 % — SIGNIFICANT CHANGE UP (ref 34.5–45)
HGB BLD-MCNC: 12.5 G/DL — SIGNIFICANT CHANGE UP (ref 11.5–15.5)
MCHC RBC-ENTMCNC: 28.9 PG — SIGNIFICANT CHANGE UP (ref 27–34)
MCHC RBC-ENTMCNC: 32 GM/DL — SIGNIFICANT CHANGE UP (ref 32–36)
MCV RBC AUTO: 90.5 FL — SIGNIFICANT CHANGE UP (ref 80–100)
PLATELET # BLD AUTO: 238 K/UL — SIGNIFICANT CHANGE UP (ref 150–400)
POTASSIUM SERPL-MCNC: 5.3 MMOL/L — SIGNIFICANT CHANGE UP (ref 3.5–5.3)
POTASSIUM SERPL-SCNC: 5.3 MMOL/L — SIGNIFICANT CHANGE UP (ref 3.5–5.3)
RBC # BLD: 4.32 M/UL — SIGNIFICANT CHANGE UP (ref 3.8–5.2)
RBC # FLD: 13.5 % — SIGNIFICANT CHANGE UP (ref 10.3–14.5)
SODIUM SERPL-SCNC: 137 MMOL/L — SIGNIFICANT CHANGE UP (ref 135–145)
WBC # BLD: 14.68 K/UL — HIGH (ref 3.8–10.5)
WBC # FLD AUTO: 14.68 K/UL — HIGH (ref 3.8–10.5)

## 2018-12-03 PROCEDURE — 99232 SBSQ HOSP IP/OBS MODERATE 35: CPT

## 2018-12-03 RX ORDER — INSULIN GLARGINE 100 [IU]/ML
15 INJECTION, SOLUTION SUBCUTANEOUS AT BEDTIME
Qty: 0 | Refills: 0 | Status: DISCONTINUED | OUTPATIENT
Start: 2018-12-03 | End: 2018-12-05

## 2018-12-03 RX ORDER — INSULIN LISPRO 100/ML
10 VIAL (ML) SUBCUTANEOUS
Qty: 0 | Refills: 0 | Status: DISCONTINUED | OUTPATIENT
Start: 2018-12-03 | End: 2018-12-05

## 2018-12-03 RX ADMIN — ROSUVASTATIN CALCIUM 10 MILLIGRAM(S): 5 TABLET ORAL at 21:02

## 2018-12-03 RX ADMIN — Medication 81 MILLIGRAM(S): at 12:55

## 2018-12-03 RX ADMIN — INSULIN GLARGINE 15 UNIT(S): 100 INJECTION, SOLUTION SUBCUTANEOUS at 22:50

## 2018-12-03 RX ADMIN — MONTELUKAST 10 MILLIGRAM(S): 4 TABLET, CHEWABLE ORAL at 12:55

## 2018-12-03 RX ADMIN — Medication 2: at 12:57

## 2018-12-03 RX ADMIN — Medication 0.5 MILLIGRAM(S): at 17:57

## 2018-12-03 RX ADMIN — Medication 10 UNIT(S): at 12:57

## 2018-12-03 RX ADMIN — Medication 3 MILLILITER(S): at 09:16

## 2018-12-03 RX ADMIN — Medication 6: at 09:17

## 2018-12-03 RX ADMIN — Medication 3 MILLILITER(S): at 05:36

## 2018-12-03 RX ADMIN — Medication 1 DROP(S): at 21:01

## 2018-12-03 RX ADMIN — Medication 30 MILLIGRAM(S): at 21:01

## 2018-12-03 RX ADMIN — ENOXAPARIN SODIUM 40 MILLIGRAM(S): 100 INJECTION SUBCUTANEOUS at 22:51

## 2018-12-03 RX ADMIN — Medication 1200 MILLIGRAM(S): at 05:35

## 2018-12-03 RX ADMIN — ISOSORBIDE MONONITRATE 30 MILLIGRAM(S): 60 TABLET, EXTENDED RELEASE ORAL at 12:56

## 2018-12-03 RX ADMIN — Medication 3 MILLILITER(S): at 17:56

## 2018-12-03 RX ADMIN — Medication 0: at 17:57

## 2018-12-03 RX ADMIN — Medication 400 MILLIGRAM(S): at 12:55

## 2018-12-03 RX ADMIN — OLMESARTAN MEDOXOMIL 20 MILLIGRAM(S): 5 TABLET, FILM COATED ORAL at 05:35

## 2018-12-03 RX ADMIN — Medication 3 MILLILITER(S): at 22:51

## 2018-12-03 RX ADMIN — Medication 2000 UNIT(S): at 12:55

## 2018-12-03 RX ADMIN — Medication 3 MILLILITER(S): at 14:58

## 2018-12-03 RX ADMIN — Medication 10 UNIT(S): at 17:56

## 2018-12-03 RX ADMIN — Medication 40 MILLIGRAM(S): at 05:35

## 2018-12-03 RX ADMIN — Medication 1 DROP(S): at 14:58

## 2018-12-03 RX ADMIN — Medication 0.5 MILLIGRAM(S): at 05:36

## 2018-12-03 RX ADMIN — Medication 1 TABLET(S): at 12:55

## 2018-12-03 RX ADMIN — Medication 30 MILLIGRAM(S): at 14:58

## 2018-12-03 RX ADMIN — Medication 1 DROP(S): at 05:36

## 2018-12-03 NOTE — PROGRESS NOTE ADULT - PROBLEM SELECTOR PLAN 1
-Test BG ac and hs  -Lantus 15 qhs plus Humalog 10 units ac meals  -Humalog 10 ac meals. HOLD IF NOT EATING.  -Continue Humalog moderate correction scale ac and hs  -Please contact endo team with any changes on steroid therapy!  -Plan discussed with pt/team.  Contact info: [phone] (24/7). pager [phone]  -

## 2018-12-03 NOTE — PROGRESS NOTE ADULT - SUBJECTIVE AND OBJECTIVE BOX
DIABETES FOLLOW UP NOTE: Saw pt earlier today  INTERVAL HX: 59 y/o F w/h/o controlled T2DM on Metformin 500mg bid. Also h/o CAD and COPD. Here with COPD exacerbation on Methylprednisolone 40mg q8h. Pt reports not improvement of her lung function. Still with SOB at exertion. Tolerating POs but limiting carbs to improve hyperglycemia and also states trying to loose weight so she can get in the list for a lung transplant. Glycemic control remains above goal while on present insulin regimen. Increased Humalog ac meals to 10 units but pt didn't receive any Humalog with breakfast. Only got Humalog correction scale.      Review of Systems:  General: As above  Cardiovascular: No chest pain, palpitations  Respiratory: On and off SOB, no cough  GI: No nausea, vomiting, abdominal pain  Endocrine: no polyuria, polydipsia or S&Sx of hypoglycemia    Allergies    No Known Allergies    Intolerances      MEDICATIONS:  cholecalciferol 2000 Unit(s) Oral daily  insulin glargine Injectable (LANTUS) 12 Unit(s) SubCutaneous at bedtime  insulin lispro (HumaLOG) corrective regimen sliding scale   SubCutaneous three times a day before meals  insulin lispro (HumaLOG) corrective regimen sliding scale   SubCutaneous at bedtime  insulin lispro Injectable (HumaLOG) 6 Unit(s) SubCutaneous three times a day before meals  methylPREDNISolone sodium succinate Injectable 30 milliGRAM(s) IV Push every 8 hours  rosuvastatin 10 milliGRAM(s) Oral at bedtime        PHYSICAL EXAM:  VITALS: T(C): 36.8 (12-03-18 @ 12:22)  T(F): 98.2 (12-03-18 @ 12:22), Max: 98.2 (12-03-18 @ 12:22)  HR: 88 (12-03-18 @ 12:22) (86 - 103)  BP: 152/82 (12-03-18 @ 12:22) (133/83 - 157/86)  RR:  (18 - 18)  SpO2:  (97% - 99%)  Wt(kg): --  GENERAL: Female sitting in chair  in NAD  Abdomen: Soft, nontender, non distended. central adiposity  Extremities: Warm, no edema in all 4 exts  NEURO: A&O X3    LABS:  POCT Blood Glucose.: 192 mg/dL (12-03-18 @ 12:51)  POCT Blood Glucose.: 279 mg/dL (12-03-18 @ 08:33)  POCT Blood Glucose.: 232 mg/dL (12-02-18 @ 21:59)  POCT Blood Glucose.: 251 mg/dL (12-02-18 @ 17:06)  POCT Blood Glucose.: 244 mg/dL (12-02-18 @ 12:39)  POCT Blood Glucose.: 288 mg/dL (12-02-18 @ 08:50)  POCT Blood Glucose.: 342 mg/dL (12-01-18 @ 21:39)  POCT Blood Glucose.: 349 mg/dL (12-01-18 @ 17:53)  POCT Blood Glucose.: 233 mg/dL (12-01-18 @ 12:21)  POCT Blood Glucose.: 303 mg/dL (12-01-18 @ 08:42)  POCT Blood Glucose.: 304 mg/dL (11-30-18 @ 22:07)  POCT Blood Glucose.: 233 mg/dL (11-30-18 @ 17:11)                            12.5   14.68 )-----------( 238      ( 03 Dec 2018 07:55 )             39.1       12-03    137  |  94<L>  |  34<H>  ----------------------------<  322<H>  5.3   |  33<H>  |  1.24    EGFR if : 55<L>  EGFR if non : 47<L>    Ca    9.5      12-03    Hemoglobin A1C, Whole Blood: 7.2 % <H> [4.0 - 5.6] (11-30-18 @ 07:58)

## 2018-12-03 NOTE — PROGRESS NOTE ADULT - ASSESSMENT
59 y/o F w/h/o controlled T2DM on Metformin 500mg bid. Also h/o CAD and COPD. Here with COPD exacerbation on Methylprednisolone 40mg q8h. Still c/o SOB at exertion. Tolerating POs but limiting carbs to improve hyperglycemia and trying to loose weight. Glycemic control remains above goal while on present insulin regimen. Increased basal/bolus insulin while on steroids> BG goal 100 to 200 while on steroids. No hypoglycemia.  Spoke to pt about the need for insulin while on steroids> Pt agreed with plan> Pt also made aware that she will be on steroids if she has a transplant.

## 2018-12-03 NOTE — PROGRESS NOTE ADULT - SUBJECTIVE AND OBJECTIVE BOX
SUBJECTIVE:  Resting in bed.  No SOB on NC.  Feeling significantly improved from admission, but reports dyspnea with mild exertion and feels that breathing is not yet close to prior baseline.  Able to speak in full sentences.  No N/V.  NAD.    MEDICATIONS  (STANDING):  ALBUTerol/ipratropium for Nebulization 3 milliLiter(s) Nebulizer <User Schedule>  artificial tears (preservative free) Ophthalmic Solution 1 Drop(s) Both EYES three times a day  aspirin enteric coated 81 milliGRAM(s) Oral daily  buDESOnide   0.5 milliGRAM(s) Respule 0.5 milliGRAM(s) Inhalation every 12 hours  cholecalciferol 2000 Unit(s) Oral daily  dextrose 5%. 1000 milliLiter(s) (50 mL/Hr) IV Continuous <Continuous>  dextrose 50% Injectable 12.5 Gram(s) IV Push once  dextrose 50% Injectable 25 Gram(s) IV Push once  dextrose 50% Injectable 25 Gram(s) IV Push once  enoxaparin Injectable 40 milliGRAM(s) SubCutaneous every 24 hours  insulin glargine Injectable (LANTUS) 15 Unit(s) SubCutaneous at bedtime  insulin lispro (HumaLOG) corrective regimen sliding scale   SubCutaneous three times a day before meals  insulin lispro (HumaLOG) corrective regimen sliding scale   SubCutaneous at bedtime  insulin lispro Injectable (HumaLOG) 10 Unit(s) SubCutaneous three times a day before meals  isosorbide   mononitrate ER Tablet (IMDUR) 30 milliGRAM(s) Oral daily  methylPREDNISolone sodium succinate Injectable 30 milliGRAM(s) IV Push every 8 hours  montelukast 10 milliGRAM(s) Oral daily  multivitamin 1 Tablet(s) Oral daily  olmesartan 20 milliGRAM(s) Oral daily  rosuvastatin 10 milliGRAM(s) Oral at bedtime  theophylline ER Capsule 400 milliGRAM(s) Oral daily    MEDICATIONS  (PRN):  dextrose 40% Gel 15 Gram(s) Oral once PRN Blood Glucose LESS THAN 70 milliGRAM(s)/deciliter  glucagon  Injectable 1 milliGRAM(s) IntraMuscular once PRN Glucose LESS THAN 70 milligrams/deciliter  sodium chloride 0.65% Nasal 1 Spray(s) Both Nostrils two times a day PRN Nasal Congestion      Vital Signs Last 24 Hrs  T(C): 36.8 (03 Dec 2018 12:22), Max: 36.8 (03 Dec 2018 12:22)  T(F): 98.2 (03 Dec 2018 12:22), Max: 98.2 (03 Dec 2018 12:22)  HR: 88 (03 Dec 2018 12:22) (86 - 115)  BP: 152/82 (03 Dec 2018 12:22) (133/83 - 157/86)  BP(mean): --  RR: 18 (03 Dec 2018 12:22) (18 - 18)  SpO2: 99% (03 Dec 2018 12:22) (95% - 99%)    CAPILLARY BLOOD GLUCOSE      POCT Blood Glucose.: 192 mg/dL (03 Dec 2018 12:51)  POCT Blood Glucose.: 279 mg/dL (03 Dec 2018 08:33)  POCT Blood Glucose.: 232 mg/dL (02 Dec 2018 21:59)  POCT Blood Glucose.: 251 mg/dL (02 Dec 2018 17:06)    I&O's Summary    02 Dec 2018 07:01  -  03 Dec 2018 07:00  --------------------------------------------------------  IN: 1440 mL / OUT: 900 mL / NET: 540 mL        PHYSICAL EXAM:  GENERAL: Looks stated age, NAD  CARDIOVASCULAR: Normal S1, S2  PULMONARY: Decreased BS b/l, left slightly greater than right, but lungs clear to auscultation bilaterally with no wheezes/rales/rhonchi  GI: Abdomen soft, Nontender. Bowel sounds present  MSK/Ext:  No leg edema.  No calf tenderness bilaterally  PSYCH: Normal Affect.      LABS:                        12.5   14.68 )-----------( 238      ( 03 Dec 2018 07:55 )             39.1     12-03    137  |  94<L>  |  34<H>  ----------------------------<  322<H>  5.3   |  33<H>  |  1.24    Ca    9.5      03 Dec 2018 07:04                  RADIOLOGY & ADDITIONAL TESTS:

## 2018-12-03 NOTE — PROGRESS NOTE ADULT - PROBLEM SELECTOR PLAN 3
A1C 7.2, but with hypoglycemia in setting of Solumedrol use.  Continue Lantus and pre-meal Humalog per endocrine, dosages just increased this morning.  Continue to monitor blood sugars.

## 2018-12-03 NOTE — PROGRESS NOTE ADULT - PROBLEM SELECTOR PLAN 1
Seems to be slowly improving.  Pulmonary follow-up appreciated.  Continue solumedrol, decreased to 30mg q8 hours.  Holding off on further antibiotics as patient already s/p a 7-day course of appropriate antibiotic therapy.  Continue Pulmicort, Duoneb ATC (with transition back to spiriva upon discharge), Theophylline, and Singulair.  Home Trilogy vent being arranged by pulmonary.

## 2018-12-03 NOTE — PROGRESS NOTE ADULT - SUBJECTIVE AND OBJECTIVE BOX
[facility]     PROGRESS NOTE    CHIEF COMPLAINT: chronic hypoxic/hypercapnic respiratory failure; COPD exacerbation; dyspnea    INTERVAL HISTORY: quite short of breath with minimal exertion (walking to the bathroom); minimal chest congestion and wheeze; no cough or sputum production; no fevers, chills or sweats; no chest pain/pressure or palpitations; hoarse - dry mouth    REVIEW OF SYSTEMS:  Constitutional: As per interval history  HEENT: hoarse  CV: As per interval history  Resp: As per interval history  GI: Within normal limits   : Within normal limits  Musculoskeletal: pain/numbness lower extremities with ambulation  Skin: Within normal limits  Neurological: Within normal limits  Psychiatric: Within normal limits  Endocrine: Within normal limits  Hematologic/Lymphatic: Within normal limits  Allergic/Immunologic: Within normal limits      MEDICATIONS:     Pulmonary "  ALBUTerol/ipratropium for Nebulization 3 milliLiter(s) Nebulizer <User Schedule>  buDESOnide   0.5 milliGRAM(s) Respule 0.5 milliGRAM(s) Inhalation every 12 hours  montelukast 10 milliGRAM(s) Oral daily  theophylline ER Capsule 400 milliGRAM(s) Oral daily      Anti-microbials:      Cardiovascular:  isosorbide   mononitrate ER Tablet (IMDUR) 30 milliGRAM(s) Oral daily  olmesartan 20 milliGRAM(s) Oral daily      Other:  artificial tears (preservative free) Ophthalmic Solution 1 Drop(s) Both EYES three times a day  aspirin enteric coated 81 milliGRAM(s) Oral daily  cholecalciferol 2000 Unit(s) Oral daily  dextrose 40% Gel 15 Gram(s) Oral once PRN  dextrose 5%. 1000 milliLiter(s) IV Continuous <Continuous>  dextrose 50% Injectable 12.5 Gram(s) IV Push once  dextrose 50% Injectable 25 Gram(s) IV Push once  dextrose 50% Injectable 25 Gram(s) IV Push once  enoxaparin Injectable 40 milliGRAM(s) SubCutaneous every 24 hours  glucagon  Injectable 1 milliGRAM(s) IntraMuscular once PRN  insulin glargine Injectable (LANTUS) 15 Unit(s) SubCutaneous at bedtime  insulin lispro (HumaLOG) corrective regimen sliding scale   SubCutaneous three times a day before meals  insulin lispro (HumaLOG) corrective regimen sliding scale   SubCutaneous at bedtime  insulin lispro Injectable (HumaLOG) 10 Unit(s) SubCutaneous three times a day before meals  methylPREDNISolone sodium succinate Injectable 30 milliGRAM(s) IV Push every 8 hours  multivitamin 1 Tablet(s) Oral daily  rosuvastatin 10 milliGRAM(s) Oral at bedtime  sodium chloride 0.65% Nasal 1 Spray(s) Both Nostrils two times a day PRN        OBJECTIVE:    I&O's Detail    02 Dec 2018 07:01  -  03 Dec 2018 07:00  --------------------------------------------------------  IN:    Oral Fluid: 1440 mL  Total IN: 1440 mL    OUT:    Voided: 900 mL  Total OUT: 900 mL    Total NET: 540 mL    POCT Blood Glucose.: 192 mg/dL (03 Dec 2018 12:51)  POCT Blood Glucose.: 279 mg/dL (03 Dec 2018 08:33)  POCT Blood Glucose.: 232 mg/dL (02 Dec 2018 21:59)  POCT Blood Glucose.: 251 mg/dL (02 Dec 2018 17:06)      PHYSICAL EXAM:       ICU Vital Signs Last 24 Hrs  T(C): 36.8 (03 Dec 2018 12:22), Max: 36.8 (03 Dec 2018 12:22)  T(F): 98.2 (03 Dec 2018 12:22), Max: 98.2 (03 Dec 2018 12:22)  HR: 88 (03 Dec 2018 12:22) (86 - 115)  BP: 152/82 (03 Dec 2018 12:22) (133/83 - 157/86)  BP(mean): --  ABP: --  ABP(mean): --  RR: 18 (03 Dec 2018 12:22) (18 - 18)  SpO2: 99% (03 Dec 2018 12:22) (95% - 99%) on 3lpm nasal canula at rest     General: Awake. Alert. Cooperative. No distress. Appears stated age. Obese 	  HEENT:  Atraumatic. Normocephalic. Anicteric. Normal oral mucosa. PERRL. EOMI.  Neck: Supple. Trachea midline. Thyroid without enlargement/tenderness/nodules. No carotid bruit. No JVD.	  Cardiovascular: Regular rate and rhythm. Distant S1 S2. No murmurs, rubs or gallops.  Respiratory: Respirations unlabored. Markedly decreased breath sounds throughout. Faint wheeze. Prolonged expiratory phase of respiration. No curvature.  Abdomen: Soft. Non-tender. Non-distended. No organomegaly. No masses. Normal bowel sounds. Obese  Extremities: Warm to touch. No clubbing or cyanosis. No pedal edema.  Pulses: Decreased lower extremity peripheral pulses  Skin: Erythematous palms. No rashes or lesions. No ecchymoses. No cyanosis. Warm to touch.  Lymph Nodes: Cervical, supraclavicular and axillary nodes normal  Neurological: Motor and sensory examination equal and normal. A and O x 3  Psychiatry: Appropriate mood and affect.    LABS:                        12.5   14.68 )-----------( 238      ( 03 Dec 2018 07:55 )             39.1     12-03    137  |  94<L>  |  34<H>  ----------------------------<  322<H>  5.3   |  33<H>  |  1.24    12-02    140  |  94<L>  |  39<H>  ----------------------------<  293<H>  5.6<H>   |  36<H>  |  1.34<H>    Ca      9.5      12-03    Ca      10.2      12-02    Phos    4.9     11-30      Mg       2.2     11-30    TPro  6.4  /  Alb  3.8  /  TBili  0.2  /  DBili  x   /  AST  9<L>  /  ALT  19  /  AlkPhos  53  11-30    TPro  7.1  /  Alb  4.1  /  TBili  0.4  /  DBili  x   /  AST  13  /  ALT  21  /  AlkPhos  61  11-29    Venous Blood Gas:  11-29 @ 14:10  7.39/62/67/37/93  VBG Lactate: 1.7    < from: Transthoracic Echocardiogram (02.18.14 @ 08:32) >    Patient name: GUY HARTMAN  YOB: 1960   Age: 54 (F)   MR#: [id number]  Study Date: 2/18/2014  Location: [address] [place], [place] [id number]: Lashanda Geller RDCS  Study quality: Technically difficult  Referring Physician: John Gifford MD  Blood Pressure: 149/78 mmHg  Height: 5ft 6in  Weight: 195 lb  BSA: 2 m2  ------------------------------------------------------------------------  PROCEDURE: Transthoracic echocardiogram with 2-D, M-Mode  and complete spectral and color flow Doppler.  INDICATION: Dyspnea/SOB (786.09)  ------------------------------------------------------------------------  Dimensions:    Normal Values:  LA:     3.8    2.0 - 4.0 cm  Ao:     3.3    2.0 - 3.8 cm  SEPTUM: 0.9    0.6 - 1.2 cm  PWT:    0.9    0.6 - 1.1 cm  LVIDd:  4.9    3.0 - 5.6 cm  LVIDs:  3.2    1.8 - 4.0 cm  Derived variables:  LVMI: 77 g/m2  RWT: 0.36  Fractional short: 35 %  Ejection Fraction: 64 %  ------------------------------------------------------------------------  Observations:  Mitral Valve: Normal mitral valve. No significant mitral  valve regurgitation seen.  Aortic Valve/Aorta: Aortic valve not well visualized.  Normal aortic root.  Left Atrium: Normal left atrium.  LA volume index = 21  cc/m2.  Left Ventricle: Endocardium not well visualized; grossly  normal left ventricular systolic function. Normal left  ventricular internal dimensions and wall thicknesses.  Right Heart: Normal right atrium. Normal right ventricular  size and function. Tricuspid valve not well visualized,  probably normal. Trace tricuspid regurgitation. Pulmonic  valve not well visualized.  Hemodynamic: Estimated right atrial pressure is 10 mm Hg.  Inadequate tricuspid regurgitation Doppler envelope  precludes estimation of RVSP.  ------------------------------------------------------------------------  Conclusions:  1. Normal mitral valve. No significant mitral valve  regurgitation seen.  2. Aortic valve not well visualized.  3. Endocardium not well visualized; grossly normal left  ventricular systolic function.  4. Inadequate tricuspid regurgitation Doppler envelope  precludes estimation of RVSP.  5. Tricuspid valve not well visualized, probably normal.  Trace tricuspid regurgitation.  ------------------------------------------------------------------------  Confirmed on  2/18/2014 - 10:13:08 by David Schumacher M.D.  ------------------------------------------------------------------------    < end of copied text >  ---------------------------------------------------------------------------------------------------------------    MICROBIOLOGY:     Rapid Respiratory Viral Panel (11.30.18 @ 01:30)    Rapid RVP Result: [facility] [employer] RVP Rapid uses polymerase chain reaction (PCR) and melt  curve analysis to screen for adenovirus; coronavirus HKU1, NL63, 229E,  OC43; human metapneumovirus (hMPV); human enterovirus/rhinovirus  (Entero/RV); influenza A; influenza A/H1;influenza A/H3; influenza  A/H1-2009; influenza B; parainfluenza viruses 1, 2, 3, 4; respiratory  syncytial virus; Bordetella pertussis; Mycoplasma pneumoniae; and  Chlamydophila pneumoniae.    RADIOLOGY:  [x] Chest radiographs reviewed and interpreted by me    < from: Xray Chest 1 View AP/PA (11.29.18 @ 14:53) >    EXAM:  XR CHEST AP OR PA 1V                          PROCEDURE DATE:  11/29/2018      INTERPRETATION:  A single chest x-ray was obtained on November 29, 2018.    Indication: Shortness of breath.    Impression:    The heart is normal in size. The lungs are clear. The visualized bones   are normal.    ALEX CUELLAR M.D., ATTENDING RADIOLOGIST  This document has been electronically signed. Nov 29 2018  2:59PM    < end of copied text >  ---------------------------------------------------------------------------------------------------------------  SPIROMETRY:     FEV1 0.56 liters - 22% predicted  FVC 1.73 liters - 52% predicted  FEV1% 32    c/w very severe obstructive lung disease

## 2018-12-03 NOTE — PROGRESS NOTE ADULT - ASSESSMENT
ASSESSMENT:    acute on chronic hypoxic and hypercapnic respiratory failure due to very severe COPD with exacerbation - adequate oxygenation on nasal canula in the setting of profound dyspnea suggests that alterations in the mechanics of breathing due to lung hyperinflation are playing a significant role in shortness of breath    HTN/HLD/DM    CAD s/p PCI    PAD    PLAN/RECOMMENDATIONS:    oxygen supplementation to keep saturation greater than 92%  ABG - refused  home trilogy vent has been arranged with [facility]  would consider low dose narcotics for dyspnea watching closely for sedation or worsening hypercapnia - would not start without consultation with Dr. Mathew  albuterol/atrovent nebs q4h holding 2AM dose  pulmicort 0.5mg nebs q12h  singulair/theophylline - discontinue mucinex  decrease solumedrol to 30mg IVP q8h - glucose control on high dose steroids  cardiac meds: ASA/crestor/imdur/olmesartan  DVT prophylaxais - SQ lovenox  outpatient evaluation ongoing for lung transplantation  continue outpatient pulmonary rehabilitation    Will follow with you. Plan of care discussed with the patient at bedside. She will take many months to return to close to her baseline respiratory status following this exacerbation.    David Fair MD, [facility] - [phone]  Pulmonary Medicine

## 2018-12-04 LAB
ANION GAP SERPL CALC-SCNC: 8 MMOL/L — SIGNIFICANT CHANGE UP (ref 5–17)
ANION GAP SERPL CALC-SCNC: 9 MMOL/L — SIGNIFICANT CHANGE UP (ref 5–17)
BUN SERPL-MCNC: 33 MG/DL — HIGH (ref 7–23)
BUN SERPL-MCNC: 34 MG/DL — HIGH (ref 7–23)
CALCIUM SERPL-MCNC: 9.7 MG/DL — SIGNIFICANT CHANGE UP (ref 8.4–10.5)
CALCIUM SERPL-MCNC: 9.8 MG/DL — SIGNIFICANT CHANGE UP (ref 8.4–10.5)
CHLORIDE SERPL-SCNC: 95 MMOL/L — LOW (ref 96–108)
CHLORIDE SERPL-SCNC: 95 MMOL/L — LOW (ref 96–108)
CO2 SERPL-SCNC: 34 MMOL/L — HIGH (ref 22–31)
CO2 SERPL-SCNC: 36 MMOL/L — HIGH (ref 22–31)
CREAT SERPL-MCNC: 1.1 MG/DL — SIGNIFICANT CHANGE UP (ref 0.5–1.3)
CREAT SERPL-MCNC: 1.21 MG/DL — SIGNIFICANT CHANGE UP (ref 0.5–1.3)
GLUCOSE BLDC GLUCOMTR-MCNC: 127 MG/DL — HIGH (ref 70–99)
GLUCOSE BLDC GLUCOMTR-MCNC: 202 MG/DL — HIGH (ref 70–99)
GLUCOSE BLDC GLUCOMTR-MCNC: 253 MG/DL — HIGH (ref 70–99)
GLUCOSE BLDC GLUCOMTR-MCNC: 86 MG/DL — SIGNIFICANT CHANGE UP (ref 70–99)
GLUCOSE SERPL-MCNC: 200 MG/DL — HIGH (ref 70–99)
GLUCOSE SERPL-MCNC: 264 MG/DL — HIGH (ref 70–99)
HCT VFR BLD CALC: 42.9 % — SIGNIFICANT CHANGE UP (ref 34.5–45)
HGB BLD-MCNC: 13.5 G/DL — SIGNIFICANT CHANGE UP (ref 11.5–15.5)
MCHC RBC-ENTMCNC: 28.8 PG — SIGNIFICANT CHANGE UP (ref 27–34)
MCHC RBC-ENTMCNC: 31.5 GM/DL — LOW (ref 32–36)
MCV RBC AUTO: 91.7 FL — SIGNIFICANT CHANGE UP (ref 80–100)
PLATELET # BLD AUTO: 274 K/UL — SIGNIFICANT CHANGE UP (ref 150–400)
POTASSIUM SERPL-MCNC: 4.9 MMOL/L — SIGNIFICANT CHANGE UP (ref 3.5–5.3)
POTASSIUM SERPL-MCNC: 6.7 MMOL/L — CRITICAL HIGH (ref 3.5–5.3)
POTASSIUM SERPL-SCNC: 4.9 MMOL/L — SIGNIFICANT CHANGE UP (ref 3.5–5.3)
POTASSIUM SERPL-SCNC: 6.7 MMOL/L — CRITICAL HIGH (ref 3.5–5.3)
RBC # BLD: 4.68 M/UL — SIGNIFICANT CHANGE UP (ref 3.8–5.2)
RBC # FLD: 13.6 % — SIGNIFICANT CHANGE UP (ref 10.3–14.5)
SODIUM SERPL-SCNC: 137 MMOL/L — SIGNIFICANT CHANGE UP (ref 135–145)
SODIUM SERPL-SCNC: 140 MMOL/L — SIGNIFICANT CHANGE UP (ref 135–145)
WBC # BLD: 14.24 K/UL — HIGH (ref 3.8–10.5)
WBC # FLD AUTO: 14.24 K/UL — HIGH (ref 3.8–10.5)

## 2018-12-04 PROCEDURE — 99232 SBSQ HOSP IP/OBS MODERATE 35: CPT

## 2018-12-04 PROCEDURE — 71275 CT ANGIOGRAPHY CHEST: CPT | Mod: 26

## 2018-12-04 PROCEDURE — 93010 ELECTROCARDIOGRAM REPORT: CPT

## 2018-12-04 RX ORDER — NYSTATIN 500MM UNIT
500000 POWDER (EA) MISCELLANEOUS
Qty: 0 | Refills: 0 | Status: DISCONTINUED | OUTPATIENT
Start: 2018-12-04 | End: 2019-01-08

## 2018-12-04 RX ORDER — ALPRAZOLAM 0.25 MG
0.25 TABLET ORAL ONCE
Qty: 0 | Refills: 0 | Status: DISCONTINUED | OUTPATIENT
Start: 2018-12-04 | End: 2018-12-04

## 2018-12-04 RX ADMIN — Medication 0.5 MILLIGRAM(S): at 17:17

## 2018-12-04 RX ADMIN — OLMESARTAN MEDOXOMIL 20 MILLIGRAM(S): 5 TABLET, FILM COATED ORAL at 06:25

## 2018-12-04 RX ADMIN — Medication 30 MILLIGRAM(S): at 06:23

## 2018-12-04 RX ADMIN — Medication 500000 UNIT(S): at 17:22

## 2018-12-04 RX ADMIN — Medication 0.5 MILLIGRAM(S): at 06:41

## 2018-12-04 RX ADMIN — Medication 1 DROP(S): at 22:31

## 2018-12-04 RX ADMIN — Medication 10 UNIT(S): at 08:59

## 2018-12-04 RX ADMIN — ROSUVASTATIN CALCIUM 10 MILLIGRAM(S): 5 TABLET ORAL at 22:31

## 2018-12-04 RX ADMIN — INSULIN GLARGINE 15 UNIT(S): 100 INJECTION, SOLUTION SUBCUTANEOUS at 22:29

## 2018-12-04 RX ADMIN — Medication 30 MILLIGRAM(S): at 22:30

## 2018-12-04 RX ADMIN — Medication 2000 UNIT(S): at 12:43

## 2018-12-04 RX ADMIN — Medication 3 MILLILITER(S): at 10:04

## 2018-12-04 RX ADMIN — MONTELUKAST 10 MILLIGRAM(S): 4 TABLET, CHEWABLE ORAL at 12:43

## 2018-12-04 RX ADMIN — Medication 30 MILLIGRAM(S): at 14:15

## 2018-12-04 RX ADMIN — Medication 1 DROP(S): at 06:25

## 2018-12-04 RX ADMIN — Medication 3 MILLILITER(S): at 06:24

## 2018-12-04 RX ADMIN — ENOXAPARIN SODIUM 40 MILLIGRAM(S): 100 INJECTION SUBCUTANEOUS at 22:31

## 2018-12-04 RX ADMIN — Medication 3 MILLILITER(S): at 17:17

## 2018-12-04 RX ADMIN — Medication 400 MILLIGRAM(S): at 12:43

## 2018-12-04 RX ADMIN — Medication 2: at 22:30

## 2018-12-04 RX ADMIN — Medication 10 UNIT(S): at 17:16

## 2018-12-04 RX ADMIN — ISOSORBIDE MONONITRATE 30 MILLIGRAM(S): 60 TABLET, EXTENDED RELEASE ORAL at 12:44

## 2018-12-04 RX ADMIN — Medication 81 MILLIGRAM(S): at 12:43

## 2018-12-04 RX ADMIN — Medication 3 MILLILITER(S): at 22:20

## 2018-12-04 RX ADMIN — Medication 1 TABLET(S): at 12:44

## 2018-12-04 RX ADMIN — Medication 4: at 08:59

## 2018-12-04 RX ADMIN — Medication 0.25 MILLIGRAM(S): at 15:50

## 2018-12-04 NOTE — PROGRESS NOTE ADULT - SUBJECTIVE AND OBJECTIVE BOX
DIABETES FOLLOW UP NOTE: Saw pt earlier today  INTERVAL HX: 59 y/o F w/h/o controlled T2DM on Metformin 500mg bid. Also h/o CAD and COPD. Here with COPD exacerbation on Methylprednisolone dose decreased  today to 30mg q8h. Glycemic control variable depending on PO intake. BGs levels between high 100s to 200s. No hypoglycemia      Review of Systems:  General:   General: States breathing still the same.  Cardiovascular: No chest pain, palpitations  Respiratory: On and off SOB, no cough  GI: No nausea, vomiting, abdominal pain  Endocrine: no polyuria, polydipsia or S&Sx of hypoglycemia    Allergies    No Known Allergies    Intolerances      MEDICATIONS:  cholecalciferol 2000 Unit(s) Oral daily  insulin glargine Injectable (LANTUS) 15 Unit(s) SubCutaneous at bedtime  insulin lispro (HumaLOG) corrective regimen sliding scale   SubCutaneous three times a day before meals  insulin lispro (HumaLOG) corrective regimen sliding scale   SubCutaneous at bedtime  insulin lispro Injectable (HumaLOG) 10 Unit(s) SubCutaneous three times a day before meals  methylPREDNISolone sodium succinate Injectable 30 milliGRAM(s) IV Push every 8 hours  rosuvastatin 10 milliGRAM(s) Oral at bedtime  theophylline ER Capsule 400 milliGRAM(s) Oral daily    PHYSICAL EXAM:  Vital Signs Last 24 Hrs  T(C): 36.7 (12-04-18 @ 09:17), Max: 36.7 (12-03-18 @ 20:27)  T(F): 98.1 (12-04-18 @ 09:17), Max: 98.1 (12-04-18 @ 09:17)  HR: 82 (12-04-18 @ 12:52) (82 - 104)  BP: 130/80 (12-04-18 @ 12:52) (128/76 - 149/78)  BP(mean): --  RR: 18 (12-04-18 @ 09:17) (18 - 18)  SpO2: 97% (12-04-18 @ 09:17) (93% - 99%)  GENERAL: Female laying in bed in NAD  Abdomen: Soft, nontender, non distended, central adiposity  Extremities: Warm, no edema in all 4 exts  NEURO: A&O X3    LABS:  POCT Blood Glucose.: 127 mg/dL (12-04-18 @ 12:32)  POCT Blood Glucose.: 202 mg/dL (12-04-18 @ 08:42)  POCT Blood Glucose.: 230 mg/dL (12-03-18 @ 22:08)  POCT Blood Glucose.: 134 mg/dL (12-03-18 @ 17:27)  POCT Blood Glucose.: 192 mg/dL (12-03-18 @ 12:51)  POCT Blood Glucose.: 279 mg/dL (12-03-18 @ 08:33)  POCT Blood Glucose.: 232 mg/dL (12-02-18 @ 21:59)  POCT Blood Glucose.: 251 mg/dL (12-02-18 @ 17:06)                          13.5   14.24 )-----------( 274      ( 04 Dec 2018 07:58 )             42.9                 12-04    137  |  95<L>  |  33<H>  ----------------------------<  200<H>  4.9   |  34<H>  |  1.10    Ca    9.7      04 Dec 2018 09:31      EGFR if : 55<L>  EGFR if non : 47<L>    Ca    9.5      12-03    Hemoglobin A1C, Whole Blood: 7.2 % <H> [4.0 - 5.6] (11-30-18 @ 07:58)

## 2018-12-04 NOTE — PROGRESS NOTE ADULT - PROBLEM SELECTOR PLAN 1
-Test BG ac and hs  -c/w Lantus 15 qhs plus Humalog 10 units ac meals  -c/w Humalog 10 ac meals. HOLD IF NOT EATING.  -Continue Humalog moderate correction scale ac and hs  -Please contact endo team with any changes on steroid therapy!  -Plan discussed with pt/team.  Contact info: [phone] (24/7). pager [phone]  -

## 2018-12-04 NOTE — PROGRESS NOTE ADULT - SUBJECTIVE AND OBJECTIVE BOX
[facility]     PROGRESS NOTE    CHIEF COMPLAINT: chronic hypoxic/hypercapnic respiratory failure; COPD exacerbation; dyspnea    INTERVAL HISTORY: in bed yelling at her brother on the phone without shortness of breath; states that she is still quite symptomatic walking to the bathroom; no cough, sputum production, chest congestion or wheeze; no fevers, chills or sweats; no chest pain/pressure or palpitations; hoarseness improved; improved glucose control    REVIEW OF SYSTEMS:  Constitutional: As per interval history  HEENT: hoarse  CV: As per interval history  Resp: As per interval history  GI: Within normal limits   : Within normal limits  Musculoskeletal: pain/numbness lower extremities with ambulation  Skin: Within normal limits  Neurological: Within normal limits  Psychiatric: Within normal limits  Endocrine: hyperglycemia  Hematologic/Lymphatic: Within normal limits  Allergic/Immunologic: Within normal limits      MEDICATIONS:     Pulmonary "  ALBUTerol/ipratropium for Nebulization 3 milliLiter(s) Nebulizer <User Schedule>  buDESOnide   0.5 milliGRAM(s) Respule 0.5 milliGRAM(s) Inhalation every 12 hours  montelukast 10 milliGRAM(s) Oral daily  theophylline ER Capsule 400 milliGRAM(s) Oral daily      Anti-microbials:      Cardiovascular:  isosorbide   mononitrate ER Tablet (IMDUR) 30 milliGRAM(s) Oral daily  olmesartan 20 milliGRAM(s) Oral daily      Other:  artificial tears (preservative free) Ophthalmic Solution 1 Drop(s) Both EYES three times a day  aspirin enteric coated 81 milliGRAM(s) Oral daily  cholecalciferol 2000 Unit(s) Oral daily  dextrose 40% Gel 15 Gram(s) Oral once PRN  dextrose 5%. 1000 milliLiter(s) IV Continuous <Continuous>  dextrose 50% Injectable 12.5 Gram(s) IV Push once  dextrose 50% Injectable 25 Gram(s) IV Push once  dextrose 50% Injectable 25 Gram(s) IV Push once  enoxaparin Injectable 40 milliGRAM(s) SubCutaneous every 24 hours  glucagon  Injectable 1 milliGRAM(s) IntraMuscular once PRN  insulin glargine Injectable (LANTUS) 15 Unit(s) SubCutaneous at bedtime  insulin lispro (HumaLOG) corrective regimen sliding scale   SubCutaneous three times a day before meals  insulin lispro (HumaLOG) corrective regimen sliding scale   SubCutaneous at bedtime  insulin lispro Injectable (HumaLOG) 10 Unit(s) SubCutaneous three times a day before meals  methylPREDNISolone sodium succinate Injectable 30 milliGRAM(s) IV Push every 8 hours  multivitamin 1 Tablet(s) Oral daily  rosuvastatin 10 milliGRAM(s) Oral at bedtime  sodium chloride 0.65% Nasal 1 Spray(s) Both Nostrils two times a day PRN        OBJECTIVE:    I&O's Detail    03 Dec 2018 07:01  -  04 Dec 2018 07:00  --------------------------------------------------------  IN:    Oral Fluid: 1157 mL  Total IN: 1157 mL    OUT:  Total OUT: 0 mL    Total NET: 1157 mL    POCT Blood Glucose.: 202 mg/dL (04 Dec 2018 08:42)  POCT Blood Glucose.: 230 mg/dL (03 Dec 2018 22:08)  POCT Blood Glucose.: 134 mg/dL (03 Dec 2018 17:27)  POCT Blood Glucose.: 192 mg/dL (03 Dec 2018 12:51)      PHYSICAL EXAM:       ICU Vital Signs Last 24 Hrs  T(C): 36.7 (04 Dec 2018 09:17), Max: 36.8 (03 Dec 2018 12:22)  T(F): 98.1 (04 Dec 2018 09:17), Max: 98.2 (03 Dec 2018 12:22)  HR: 84 (04 Dec 2018 09:17) (82 - 104)  BP: 128/76 (04 Dec 2018 09:17) (128/76 - 152/82)  BP(mean): --  ABP: --  ABP(mean): --  RR: 18 (04 Dec 2018 09:17) (18 - 18)  SpO2: 97% (04 Dec 2018 09:17) (93% - 99%) on 3lpm nasal canula at rest     General: Awake. Alert. Cooperative. No distress. Appears stated age. Obese 	  HEENT:  Atraumatic. Normocephalic. Anicteric. Normal oral mucosa. PERRL. EOMI.  Neck: Supple. Trachea midline. Thyroid without enlargement/tenderness/nodules. No carotid bruit. No JVD.	  Cardiovascular: Regular rate and rhythm. Distant S1 S2. No murmurs, rubs or gallops.  Respiratory: Respirations unlabored. Markedly decreased breath sounds throughout. Prolonged expiratory phase of respiration. No curvature.  Abdomen: Soft. Non-tender. Non-distended. No organomegaly. No masses. Normal bowel sounds. Obese  Extremities: Warm to touch. No clubbing or cyanosis. No pedal edema.  Pulses: Decreased lower extremity peripheral pulses  Skin: Erythematous palms. No rashes or lesions. No ecchymoses. No cyanosis. Warm to touch.  Lymph Nodes: Cervical, supraclavicular and axillary nodes normal  Neurological: Motor and sensory examination equal and normal. A and O x 3  Psychiatry: Appropriate mood and affect.    LABS:                        13.5   14.24 )-----------( 274      ( 04 Dec 2018 07:58 )             42.9                         12.5   14.68 )-----------( 238      ( 03 Dec 2018 07:55 )             39.1     12-04    137  |  95<L>  |  33<H>  ----------------------------<  200<H>  4.9   |  34<H>  |  1.10    12-04    140  |  95<L>  |  34<H>  ----------------------------<  264<H>  6.7<HH>   |  36<H>  |  1.21    Ca      9.7      12-04    Ca      9.8      12-04    Phos    4.9     11-30      Mg       2.2     11-30    TPro  6.4  /  Alb  3.8  /  TBili  0.2  /  DBili  x   /  AST  9<L>  /  ALT  19  /  AlkPhos  53  11-30    TPro  7.1  /  Alb  4.1  /  TBili  0.4  /  DBili  x   /  AST  13  /  ALT  21  /  AlkPhos  61  11-29    Venous Blood Gas:  11-29 @ 14:10  7.39/62/67/37/93  VBG Lactate: 1.7    < from: Transthoracic Echocardiogram (02.18.14 @ 08:32) >    Patient name: GUY HARTMAN  YOB: 1960   Age: 54 (F)   MR#: [id number]  Study Date: 2/18/2014  Location: [address] [place], [place] [id number]: Lashanda Geller [facility]  Study quality: Technically difficult  Referring Physician: John Gifford MD  Blood Pressure: 149/78 mmHg  Height: 5ft 6in  Weight: 195 lb  BSA: 2 m2  ------------------------------------------------------------------------  PROCEDURE: Transthoracic echocardiogram with 2-D, M-Mode  and complete spectral and color flow Doppler.  INDICATION: Dyspnea/SOB (786.09)  ------------------------------------------------------------------------  Dimensions:    Normal Values:  LA:     3.8    2.0 - 4.0 cm  Ao:     3.3    2.0 - 3.8 cm  SEPTUM: 0.9    0.6 - 1.2 cm  PWT:    0.9    0.6 - 1.1 cm  LVIDd:  4.9    3.0 - 5.6 cm  LVIDs:  3.2    1.8 - 4.0 cm  Derived variables:  LVMI: 77 g/m2  RWT: 0.36  Fractional short: 35 %  Ejection Fraction: 64 %  ------------------------------------------------------------------------  Observations:  Mitral Valve: Normal mitral valve. No significant mitral  valve regurgitation seen.  Aortic Valve/Aorta: Aortic valve not well visualized.  Normal aortic root.  Left Atrium: Normal left atrium.  LA volume index = 21  cc/m2.  Left Ventricle: Endocardium not well visualized; grossly  normal left ventricular systolic function. Normal left  ventricular internal dimensions and wall thicknesses.  Right Heart: Normal right atrium. Normal right ventricular  size and function. Tricuspid valve not well visualized,  probably normal. Trace tricuspid regurgitation. Pulmonic  valve not well visualized.  Hemodynamic: Estimated right atrial pressure is 10 mm Hg.  Inadequate tricuspid regurgitation Doppler envelope  precludes estimation of RVSP.  ------------------------------------------------------------------------  Conclusions:  1. Normal mitral valve. No significant mitral valve  regurgitation seen.  2. Aortic valve not well visualized.  3. Endocardium not well visualized; grossly normal left  ventricular systolic function.  4. Inadequate tricuspid regurgitation Doppler envelope  precludes estimation of RVSP.  5. Tricuspid valve not well visualized, probably normal.  Trace tricuspid regurgitation.  ------------------------------------------------------------------------  Confirmed on  2/18/2014 - 10:13:08 by David Schumacher M.D.  ------------------------------------------------------------------------    < end of copied text >  ---------------------------------------------------------------------------------------------------------------    MICROBIOLOGY:     Rapid Respiratory Viral Panel (11.30.18 @ 01:30)    Rapid RVP Result: NotDetec: The FilmArray RVP Rapid uses polymerase chain reaction (PCR) and melt  curve analysis to screen for adenovirus; coronavirus HKU1, NL63, 229E,  OC43; human metapneumovirus (hMPV); human enterovirus/rhinovirus  (Entero/RV); influenza A; influenza A/H1;influenza A/H3; influenza  A/H1-2009; influenza B; parainfluenza viruses 1, 2, 3, 4; respiratory  syncytial virus; Bordetella pertussis; Mycoplasma pneumoniae; and  Chlamydophila pneumoniae.    RADIOLOGY:  [x] Chest radiographs reviewed and interpreted by me    < from: Xray Chest 1 View AP/PA (11.29.18 @ 14:53) >    EXAM:  XR CHEST AP OR PA 1V                          PROCEDURE DATE:  11/29/2018      INTERPRETATION:  A single chest x-ray was obtained on November 29, 2018.    Indication: Shortness of breath.    Impression:    The heart is normal in size. The lungs are clear. The visualized bones   are normal.    ALEX CUELLAR M.D., ATTENDING RADIOLOGIST  This document has been electronically signed. Nov 29 2018  2:59PM    < end of copied text >  ---------------------------------------------------------------------------------------------------------------  SPIROMETRY:     FEV1 0.56 liters - 22% predicted  FVC 1.73 liters - 52% predicted  FEV1% 32    c/w very severe obstructive lung disease

## 2018-12-04 NOTE — CHART NOTE - NSCHARTNOTEFT_GEN_A_CORE
called by RN to inform pt with high potassium level from AM lab 6.7; pt with no c/o CP/palpitation; had BM yesterday; EKG done; repeat K level 4.9; placed on no concentrated potassium diet secondary to on and off pt with hyperkalemia noted.  Patient is a 58y old  Female who presents with a chief complaint of dyspnea (03 Dec 2018 16:45)        Vital Signs Last 24 Hrs  T(C): 36.7 (04 Dec 2018 09:17), Max: 36.8 (03 Dec 2018 12:22)  T(F): 98.1 (04 Dec 2018 09:17), Max: 98.2 (03 Dec 2018 12:22)  HR: 84 (04 Dec 2018 09:17) (82 - 104)  BP: 128/76 (04 Dec 2018 09:17) (128/76 - 152/82)  BP(mean): --  RR: 18 (04 Dec 2018 09:17) (18 - 18)  SpO2: 97% (04 Dec 2018 09:17) (93% - 99%)                        13.5   14.24 )-----------( 274      ( 04 Dec 2018 07:58 )             42.9     12-04    137  |  95<L>  |  33<H>  ----------------------------<  200<H>  4.9   |  34<H>  |  1.10    Ca    9.7      04 Dec 2018 09:31

## 2018-12-04 NOTE — PROGRESS NOTE ADULT - ASSESSMENT
The patient is a 58-year-old woman with PMH of COPD on home O2 3L, HTN, HLD, CAD s/p x6 stents, T2DM, pHTN, PVD, p/w progressive worsening dyspnea x 2 weeks c/w COPD exacerbation not responsive to oral prednisone, now requiring IV steroids and bronchodilators.

## 2018-12-04 NOTE — PROGRESS NOTE ADULT - ASSESSMENT
59 y/o F w/h/o controlled T2DM on Metformin 500mg bid. Also h/o CAD and COPD. Here with COPD exacerbation on Methylprednisolone 40mg q8h. Still c/o SOB on and  off. Tolerating POs with variable > dislikes hospital food.  Glycemic control remains variable but noted steroids coming down from 40mg to 30mg so will not make adjustments to insulin doses until able to determine if needed. BG goal 100 to 200 while on steroids. No hypoglycemia.

## 2018-12-04 NOTE — PROGRESS NOTE ADULT - ASSESSMENT
ASSESSMENT:    acute on chronic hypoxic and hypercapnic respiratory failure due to very severe COPD with "exacerbation" - adequate oxygenation on nasal canula in the setting of profound dyspnea suggests that alterations in the mechanics of breathing due to lung hyperinflation are playing a significant role in shortness of breath - given the absence of bronchospasm, would consider adding pulmonary embolic disease to the differential diagnosis    HTN/HLD/DM    CAD s/p PCI    PAD    PLAN/RECOMMENDATIONS:    oxygen supplementation to keep saturation greater than 92%  CTA chest  levaquin 500mg daily  home trilogy vent has been arranged with [facility] - the patient is unwilling to use the hospital BIPAP machine  would consider low dose narcotics for dyspnea watching closely for sedation or worsening hypercapnia - would not start without consultation with Dr. Mathew  albuterol/atrovent nebs q4h holding 2AM dose  pulmicort 0.5mg nebs q12h  singulair/theophylline - off mucinex  continue solumedrol to 30mg IVP q8h - glucose control on high dose steroids  cardiac meds: ASA/crestor/imdur/olmesartan  DVT prophylaxais - SQ lovenox  outpatient evaluation ongoing for lung transplantation  continue outpatient pulmonary rehabilitation    Will follow with you. Plan of care discussed with the patient at bedside and the dedicated floor NP. She will take many months to return to close to her baseline respiratory status following this exacerbation.    David Fair MD, [facility] - [phone]  Pulmonary Medicine

## 2018-12-04 NOTE — PROGRESS NOTE ADULT - PROBLEM SELECTOR PLAN 3
A1C 7.2, but with hypoglycemia in setting of Solumedrol use.  Continue Lantus and pre-meal Humalog per endocrine, dosages just increased this morning.  Continue to monitor blood sugars. A1C 7.2, but with hypoglycemia in setting of Solumedrol use.  Continue Lantus 15units and pre-meal Humalog 10units per endocrine.  Continue to monitor blood sugars.

## 2018-12-04 NOTE — CHART NOTE - NSCHARTNOTEFT_GEN_A_CORE
As per d/w md, discontinued Levaquin as patient is refusing to take Levaquin -possible reaction/unsure;  Nafisa Hernandez(NP)  3 [facility], [phone]

## 2018-12-04 NOTE — PROGRESS NOTE ADULT - PROBLEM SELECTOR PLAN 1
Seems to be slowly improving.  Pulmonary follow-up appreciated.  Continue solumedrol, decreased to 30mg q8 hours.  Holding off on further antibiotics as patient already s/p a 7-day course of appropriate antibiotic therapy.  Continue Pulmicort, Duoneb ATC (with transition back to spiriva upon discharge), Theophylline, and Singulair.  Home Trilogy vent being arranged by pulmonary. Seems to be slowly improving.  Pulmonary follow-up appreciated.  Continue solumedrol 30mg q8 hours.  Pulmonary recommended adding Levaquin, but patient refused.  Checking CTA per pulmonary recommendations.  Continue Pulmicort, Duoneb ATC (with transition back to spiriva upon discharge), Theophylline, and Singulair.  Home Trilogy vent being arranged by pulmonary.

## 2018-12-04 NOTE — PROGRESS NOTE ADULT - SUBJECTIVE AND OBJECTIVE BOX
SUBJECTIVE:  Still feeling short of breath, but able to speak in full sentences.  Denies any N/V or constipation.  NAD.    MEDICATIONS  (STANDING):  ALBUTerol/ipratropium for Nebulization 3 milliLiter(s) Nebulizer <User Schedule>  ALPRAZolam 0.25 milliGRAM(s) Oral once  artificial tears (preservative free) Ophthalmic Solution 1 Drop(s) Both EYES three times a day  aspirin enteric coated 81 milliGRAM(s) Oral daily  buDESOnide   0.5 milliGRAM(s) Respule 0.5 milliGRAM(s) Inhalation every 12 hours  cholecalciferol 2000 Unit(s) Oral daily  dextrose 5%. 1000 milliLiter(s) (50 mL/Hr) IV Continuous <Continuous>  dextrose 50% Injectable 12.5 Gram(s) IV Push once  dextrose 50% Injectable 25 Gram(s) IV Push once  dextrose 50% Injectable 25 Gram(s) IV Push once  enoxaparin Injectable 40 milliGRAM(s) SubCutaneous every 24 hours  insulin glargine Injectable (LANTUS) 15 Unit(s) SubCutaneous at bedtime  insulin lispro (HumaLOG) corrective regimen sliding scale   SubCutaneous three times a day before meals  insulin lispro (HumaLOG) corrective regimen sliding scale   SubCutaneous at bedtime  insulin lispro Injectable (HumaLOG) 10 Unit(s) SubCutaneous three times a day before meals  isosorbide   mononitrate ER Tablet (IMDUR) 30 milliGRAM(s) Oral daily  methylPREDNISolone sodium succinate Injectable 30 milliGRAM(s) IV Push every 8 hours  montelukast 10 milliGRAM(s) Oral daily  multivitamin 1 Tablet(s) Oral daily  nystatin    Suspension 441046 Unit(s) Oral four times a day  olmesartan 20 milliGRAM(s) Oral daily  rosuvastatin 10 milliGRAM(s) Oral at bedtime  theophylline ER Capsule 400 milliGRAM(s) Oral daily    MEDICATIONS  (PRN):  dextrose 40% Gel 15 Gram(s) Oral once PRN Blood Glucose LESS THAN 70 milliGRAM(s)/deciliter  glucagon  Injectable 1 milliGRAM(s) IntraMuscular once PRN Glucose LESS THAN 70 milligrams/deciliter  sodium chloride 0.65% Nasal 1 Spray(s) Both Nostrils two times a day PRN Nasal Congestion      Vital Signs Last 24 Hrs  T(C): 36.7 (04 Dec 2018 09:17), Max: 36.7 (03 Dec 2018 20:27)  T(F): 98.1 (04 Dec 2018 09:17), Max: 98.1 (04 Dec 2018 09:17)  HR: 82 (04 Dec 2018 12:52) (82 - 104)  BP: 130/80 (04 Dec 2018 12:52) (128/76 - 149/78)  BP(mean): --  RR: 18 (04 Dec 2018 09:17) (18 - 18)  SpO2: 97% (04 Dec 2018 09:17) (93% - 99%)    CAPILLARY BLOOD GLUCOSE      POCT Blood Glucose.: 127 mg/dL (04 Dec 2018 12:32)  POCT Blood Glucose.: 202 mg/dL (04 Dec 2018 08:42)  POCT Blood Glucose.: 230 mg/dL (03 Dec 2018 22:08)  POCT Blood Glucose.: 134 mg/dL (03 Dec 2018 17:27)    I&O's Summary    03 Dec 2018 07:01  -  04 Dec 2018 07:00  --------------------------------------------------------  IN: 1157 mL / OUT: 0 mL / NET: 1157 mL        PHYSICAL EXAM:  GENERAL: Looks stated age, NAD  CARDIOVASCULAR: Normal S1, S2  PULMONARY: Distant breath sounds b/l but lungs clear to auscultation with no wheezes/rales/rhonchi  GI: Abdomen soft, Nontender. Bowel sounds present  MSK/Ext:  No leg edema.  No calf tenderness bilaterally  PSYCH: Normal Affect.      LABS:                        13.5   14.24 )-----------( 274      ( 04 Dec 2018 07:58 )             42.9     12-04    137  |  95<L>  |  33<H>  ----------------------------<  200<H>  4.9   |  34<H>  |  1.10    Ca    9.7      04 Dec 2018 09:31                  RADIOLOGY & ADDITIONAL TESTS:

## 2018-12-05 DIAGNOSIS — B37.0 CANDIDAL STOMATITIS: ICD-10-CM

## 2018-12-05 DIAGNOSIS — E87.5 HYPERKALEMIA: ICD-10-CM

## 2018-12-05 DIAGNOSIS — R60.0 LOCALIZED EDEMA: ICD-10-CM

## 2018-12-05 LAB
ANION GAP SERPL CALC-SCNC: 10 MMOL/L — SIGNIFICANT CHANGE UP (ref 5–17)
ANION GAP SERPL CALC-SCNC: 8 MMOL/L — SIGNIFICANT CHANGE UP (ref 5–17)
BUN SERPL-MCNC: 31 MG/DL — HIGH (ref 7–23)
BUN SERPL-MCNC: 33 MG/DL — HIGH (ref 7–23)
CALCIUM SERPL-MCNC: 9.7 MG/DL — SIGNIFICANT CHANGE UP (ref 8.4–10.5)
CALCIUM SERPL-MCNC: 9.8 MG/DL — SIGNIFICANT CHANGE UP (ref 8.4–10.5)
CHLORIDE SERPL-SCNC: 93 MMOL/L — LOW (ref 96–108)
CHLORIDE SERPL-SCNC: 96 MMOL/L — SIGNIFICANT CHANGE UP (ref 96–108)
CO2 SERPL-SCNC: 33 MMOL/L — HIGH (ref 22–31)
CO2 SERPL-SCNC: 36 MMOL/L — HIGH (ref 22–31)
CREAT SERPL-MCNC: 0.98 MG/DL — SIGNIFICANT CHANGE UP (ref 0.5–1.3)
CREAT SERPL-MCNC: 1.18 MG/DL — SIGNIFICANT CHANGE UP (ref 0.5–1.3)
GLUCOSE BLDC GLUCOMTR-MCNC: 167 MG/DL — HIGH (ref 70–99)
GLUCOSE BLDC GLUCOMTR-MCNC: 208 MG/DL — HIGH (ref 70–99)
GLUCOSE BLDC GLUCOMTR-MCNC: 360 MG/DL — HIGH (ref 70–99)
GLUCOSE BLDC GLUCOMTR-MCNC: 68 MG/DL — LOW (ref 70–99)
GLUCOSE BLDC GLUCOMTR-MCNC: 71 MG/DL — SIGNIFICANT CHANGE UP (ref 70–99)
GLUCOSE SERPL-MCNC: 194 MG/DL — HIGH (ref 70–99)
GLUCOSE SERPL-MCNC: 61 MG/DL — LOW (ref 70–99)
POTASSIUM SERPL-MCNC: 4.9 MMOL/L — SIGNIFICANT CHANGE UP (ref 3.5–5.3)
POTASSIUM SERPL-MCNC: 5.8 MMOL/L — HIGH (ref 3.5–5.3)
POTASSIUM SERPL-SCNC: 4.9 MMOL/L — SIGNIFICANT CHANGE UP (ref 3.5–5.3)
POTASSIUM SERPL-SCNC: 5.8 MMOL/L — HIGH (ref 3.5–5.3)
SODIUM SERPL-SCNC: 137 MMOL/L — SIGNIFICANT CHANGE UP (ref 135–145)
SODIUM SERPL-SCNC: 139 MMOL/L — SIGNIFICANT CHANGE UP (ref 135–145)

## 2018-12-05 PROCEDURE — 99232 SBSQ HOSP IP/OBS MODERATE 35: CPT

## 2018-12-05 PROCEDURE — 99233 SBSQ HOSP IP/OBS HIGH 50: CPT

## 2018-12-05 RX ORDER — INSULIN LISPRO 100/ML
12 VIAL (ML) SUBCUTANEOUS
Qty: 0 | Refills: 0 | Status: DISCONTINUED | OUTPATIENT
Start: 2018-12-05 | End: 2018-12-06

## 2018-12-05 RX ORDER — INSULIN GLARGINE 100 [IU]/ML
18 INJECTION, SOLUTION SUBCUTANEOUS AT BEDTIME
Qty: 0 | Refills: 0 | Status: DISCONTINUED | OUTPATIENT
Start: 2018-12-05 | End: 2018-12-05

## 2018-12-05 RX ORDER — INSULIN LISPRO 100/ML
10 VIAL (ML) SUBCUTANEOUS
Qty: 0 | Refills: 0 | Status: DISCONTINUED | OUTPATIENT
Start: 2018-12-05 | End: 2018-12-06

## 2018-12-05 RX ORDER — INSULIN GLARGINE 100 [IU]/ML
17 INJECTION, SOLUTION SUBCUTANEOUS AT BEDTIME
Qty: 0 | Refills: 0 | Status: DISCONTINUED | OUTPATIENT
Start: 2018-12-05 | End: 2018-12-08

## 2018-12-05 RX ADMIN — Medication 81 MILLIGRAM(S): at 13:23

## 2018-12-05 RX ADMIN — ROSUVASTATIN CALCIUM 10 MILLIGRAM(S): 5 TABLET ORAL at 22:11

## 2018-12-05 RX ADMIN — Medication 30 MILLIGRAM(S): at 06:22

## 2018-12-05 RX ADMIN — Medication 3 MILLILITER(S): at 17:42

## 2018-12-05 RX ADMIN — ENOXAPARIN SODIUM 40 MILLIGRAM(S): 100 INJECTION SUBCUTANEOUS at 22:10

## 2018-12-05 RX ADMIN — Medication 400 MILLIGRAM(S): at 13:23

## 2018-12-05 RX ADMIN — Medication 0.5 MILLIGRAM(S): at 17:43

## 2018-12-05 RX ADMIN — Medication 3 MILLILITER(S): at 22:10

## 2018-12-05 RX ADMIN — Medication 6: at 22:11

## 2018-12-05 RX ADMIN — Medication 10 UNIT(S): at 08:51

## 2018-12-05 RX ADMIN — Medication 500000 UNIT(S): at 17:43

## 2018-12-05 RX ADMIN — ISOSORBIDE MONONITRATE 30 MILLIGRAM(S): 60 TABLET, EXTENDED RELEASE ORAL at 13:23

## 2018-12-05 RX ADMIN — Medication 2000 UNIT(S): at 13:23

## 2018-12-05 RX ADMIN — INSULIN GLARGINE 17 UNIT(S): 100 INJECTION, SOLUTION SUBCUTANEOUS at 22:12

## 2018-12-05 RX ADMIN — Medication 0.5 MILLIGRAM(S): at 06:26

## 2018-12-05 RX ADMIN — Medication 3 MILLILITER(S): at 13:22

## 2018-12-05 RX ADMIN — Medication 1 SPRAY(S): at 23:05

## 2018-12-05 RX ADMIN — Medication 1 DROP(S): at 22:11

## 2018-12-05 RX ADMIN — OLMESARTAN MEDOXOMIL 20 MILLIGRAM(S): 5 TABLET, FILM COATED ORAL at 06:22

## 2018-12-05 RX ADMIN — Medication 500000 UNIT(S): at 00:28

## 2018-12-05 RX ADMIN — Medication 500000 UNIT(S): at 06:22

## 2018-12-05 RX ADMIN — MONTELUKAST 10 MILLIGRAM(S): 4 TABLET, CHEWABLE ORAL at 13:22

## 2018-12-05 RX ADMIN — Medication 1 DROP(S): at 06:22

## 2018-12-05 RX ADMIN — Medication 1 TABLET(S): at 13:23

## 2018-12-05 RX ADMIN — Medication 30 MILLIGRAM(S): at 13:24

## 2018-12-05 RX ADMIN — Medication 1 DROP(S): at 13:22

## 2018-12-05 RX ADMIN — Medication 4: at 08:51

## 2018-12-05 RX ADMIN — Medication 500000 UNIT(S): at 13:22

## 2018-12-05 RX ADMIN — Medication 3 MILLILITER(S): at 06:20

## 2018-12-05 RX ADMIN — Medication 2: at 13:24

## 2018-12-05 RX ADMIN — Medication 30 MILLIGRAM(S): at 22:10

## 2018-12-05 RX ADMIN — Medication 500000 UNIT(S): at 23:05

## 2018-12-05 NOTE — PROGRESS NOTE ADULT - PROBLEM SELECTOR PLAN 1
-Test BG ac and hs  -Increase Lantus dose to 17 qhs plus Humalog 10 units ac B&L and 12 units with dinner. HOLD IF NOT EATING.  -Continue Humalog moderate correction scale ac and hs  -Please contact endo team with any changes on steroid therapy!  -Plan discussed with pt/team.  Contact info: [phone] (24/7). pager [phone]

## 2018-12-05 NOTE — PROGRESS NOTE ADULT - PROBLEM SELECTOR PLAN 1
Seems to be slowly improving.  Pulmonary follow-up appreciated.  Continue solumedrol 30mg q8 hours.  CTA with no PE, no PNA.  Continue Pulmicort, Duoneb ATC (with transition back to spiriva upon discharge), Theophylline, and Singulair.  Ordering echo and sputum culture.  Home Trilogy vent being arranged by pulmonary.

## 2018-12-05 NOTE — PROGRESS NOTE ADULT - PROBLEM SELECTOR PLAN 6
Mild hyperkalemia again this morning.  Switched to low potassium diet yesterday.  Checking repeat BMP.  Perhaps hyperkalemia secondary to Benicar, but per patient she has been on this medication for a long time with no prior issues with hyperkalemia.  Consider renal consult if hyperkalemia persists.

## 2018-12-05 NOTE — PROGRESS NOTE ADULT - ASSESSMENT
ASSESSMENT:    ongoing dyspnea with chronic hypoxic and hypercapnic respiratory failure due to very severe COPD with "exacerbation" - adequate oxygenation on nasal canula in the setting of profound dyspnea suggests that alterations in the mechanics of breathing due to lung hyperinflation are playing a significant role in shortness of breath - there is evidence of CAD without pulmonary hypertension on the CT scan    HTN/HLD/DM    CAD s/p PCI    PAD    PLAN/RECOMMENDATIONS:    oxygen supplementation to keep saturation greater than 92%  CTA chest without evidence of pulmonary emboli  sputum culture - observe off antibiotics for now  home trilogy vent has been arranged with [facility] - the patient is unwilling to use the hospital BIPAP machine  would consider low dose narcotics for dyspnea watching closely for sedation or worsening hypercapnia - would not start without consultation with Dr. Mathew  albuterol/atrovent nebs q4h holding 2AM dose  pulmicort 0.5mg nebs q12h  singulair/theophylline - off mucinex  continue solumedrol 30mg IVP q8h - glucose control on high dose steroids  cardiac meds: ASA/crestor/imdur/olmesartan  cardiology evaluation - ECHO  DVT prophylaxais - SQ lovenox  outpatient evaluation ongoing for lung transplantation  continue outpatient pulmonary rehabilitation    Will follow with you. Plan of care discussed with the patient at bedside and the dedicated floor NP. She will take many months to return to close to her baseline respiratory status following this exacerbation.    David Fair MD, [facility] - [phone]  Pulmonary Medicine

## 2018-12-05 NOTE — PROGRESS NOTE ADULT - PROBLEM SELECTOR PLAN 3
A1C 7.2, but with hypoglycemia in setting of Solumedrol use.  Continue Lantus, dose increased to 18units by endocrine.  Continue Humalog 10 units pre-meal per endocrine.  Continue to monitor blood sugars.

## 2018-12-05 NOTE — CHART NOTE - NSCHARTNOTEFT_GEN_A_CORE
Nutrition Follow Up Note  Patient seen for: patient diet complaint  Chart reviewed events noted: 58 F PMH COPD on home O2 3L, HTN, HLD, CAD s/p x6 stents, T2DM, HTN, PVD, p/w progressive worsening dyspnea x 2 weeks c/w COPD exacerbation not responsive to oral prednisone, now requiring IV steroids and bronchodilators.    continued on solumedrol 40 q8 hours      Patient on restricted diet  complains about not receiving food requests. Diet counseling provided to explain need for Na restriction related to , HTN, carbohydrate controlled diet warranted due to hyperglycemia (steroid induces), hyperkalemia noted with NCK in place.   Discussed case with NP, no diet change however menus reviewed and planned with diet tech , patient and myself to allow slight  liberalization to permit Hummus and pretzels as requested. patient then was satisfied. Reinforced diet adherence/diet education.     Source: patient    Diet : Consistent carbohydrate No snacks DASH  NCK    Patient reports: not happy with diet restrictions, co     PO intake : good appetite     Source for PO intake: RN, patient, PCA     Enteral /Parenteral Nutrition: NA      Daily Weight in k.4 ()    Pertinent Medications: MEDICATIONS  (STANDING):  ALBUTerol/ipratropium for Nebulization 3 milliLiter(s) Nebulizer <User Schedule>  artificial tears (preservative free) Ophthalmic Solution 1 Drop(s) Both EYES three times a day  aspirin enteric coated 81 milliGRAM(s) Oral daily  buDESOnide   0.5 milliGRAM(s) Respule 0.5 milliGRAM(s) Inhalation every 12 hours  cholecalciferol 2000 Unit(s) Oral daily  dextrose 5%. 1000 milliLiter(s) (50 mL/Hr) IV Continuous <Continuous>  dextrose 50% Injectable 12.5 Gram(s) IV Push once  dextrose 50% Injectable 25 Gram(s) IV Push once  dextrose 50% Injectable 25 Gram(s) IV Push once  enoxaparin Injectable 40 milliGRAM(s) SubCutaneous every 24 hours  insulin glargine Injectable (LANTUS) 18 Unit(s) SubCutaneous at bedtime  insulin lispro (HumaLOG) corrective regimen sliding scale   SubCutaneous three times a day before meals  insulin lispro (HumaLOG) corrective regimen sliding scale   SubCutaneous at bedtime  insulin lispro Injectable (HumaLOG) 10 Unit(s) SubCutaneous three times a day before meals  isosorbide   mononitrate ER Tablet (IMDUR) 30 milliGRAM(s) Oral daily  methylPREDNISolone sodium succinate Injectable 30 milliGRAM(s) IV Push every 8 hours  montelukast 10 milliGRAM(s) Oral daily  multivitamin 1 Tablet(s) Oral daily  nystatin    Suspension 308282 Unit(s) Oral four times a day  olmesartan 20 milliGRAM(s) Oral daily  rosuvastatin 10 milliGRAM(s) Oral at bedtime  theophylline ER Capsule 400 milliGRAM(s) Oral daily    MEDICATIONS  (PRN):  dextrose 40% Gel 15 Gram(s) Oral once PRN Blood Glucose LESS THAN 70 milliGRAM(s)/deciliter  glucagon  Injectable 1 milliGRAM(s) IntraMuscular once PRN Glucose LESS THAN 70 milligrams/deciliter  sodium chloride 0.65% Nasal 1 Spray(s) Both Nostrils two times a day PRN Nasal Congestion    Pertinent Labs:  @ 07:08: Na 137, BUN 33<H>, Cr 1.18, <H>, K+ 5.8<H>, Phos --, Mg --, Alk Phos --, ALT/SGPT --, AST/SGOT --, HbA1c --    Finger Sticks:  POCT Blood Glucose.: 167 mg/dL ( @ 12:30)  POCT Blood Glucose.: 208 mg/dL ( @ 08:25)  POCT Blood Glucose.: 253 mg/dL ( @ 22:15)  POCT Blood Glucose.: 86 mg/dL ( @ 16:48)      Skin per nursing documentation: no pressure breakdown  Edema: danya LE 1+    Estimated Needs:   [X ] no change since previous assessment  [ ] recalculated: Knowledge and     Previous Nutrition Diagnosis: increased nutrient needs?  Nutrition Diagnosis is:     New Nutrition Diagnosis: knowledge and beliefs  Related to:  lack of exposure to diet education   related to COPD, hyperglycemia, HTN   As evidenced by: lack of diet acceptance, relevance of diet to medical issues     Interventions: diet education  Recommend  1) Reinforce diet adherence/diet education.   2. provide menu assistance  Monitoring and Evaluation:     Continue to monitor Nutritional intake, Tolerance to diet prescription, weights, labs, skin integrity    RD remains available upon request and will follow up per protocol

## 2018-12-05 NOTE — PROGRESS NOTE ADULT - SUBJECTIVE AND OBJECTIVE BOX
DIABETES FOLLOW UP NOTE: Saw pt earlier today  INTERVAL HX: 59 y/o F w/h/o controlled T2DM on Metformin 500mg bid. Also h/o CAD and COPD. Here with COPD exacerbation on Methylprednisolone 30mg q8h. Glycemic control variable depending on PO intake and per staff pt eats at different times of the day. Yesterday, BG down to 80s after skipping breakfast but then overeating for dinner with hyperglycemia at hs. Pt remains with persistent fasting hyperglycemia and denies eating overnight. Pt also complaints she is not getting food she requests in her menu and spoke to  yesterday. No hypoglycemia.  Creat levels improved       Review of Systems:  General: States breathing still the same. reports not getting better> Pt states she needs a lung transplant  Cardiovascular: No chest pain, palpitations  Respiratory: On and off SOB, no cough  GI: No nausea, vomiting, abdominal pain  Endocrine: no polyuria, polydipsia or S&Sx of hypoglycemia    Allergies    No Known Allergies    Intolerances      MEDICATIONS:  cholecalciferol 2000 Unit(s) Oral daily  insulin glargine Injectable (LANTUS) 15 Unit(s) SubCutaneous at bedtime  insulin lispro (HumaLOG) corrective regimen sliding scale   SubCutaneous three times a day before meals  insulin lispro (HumaLOG) corrective regimen sliding scale   SubCutaneous at bedtime  insulin lispro Injectable (HumaLOG) 10 Unit(s) SubCutaneous three times a day before meals  methylPREDNISolone sodium succinate Injectable 30 milliGRAM(s) IV Push every 8 hours  rosuvastatin 10 milliGRAM(s) Oral at bedtime  theophylline ER Capsule 400 milliGRAM(s) Oral daily      PHYSICAL EXAM:  Vital Signs Last 24 Hrs  T(C): 36.7 (12-05-18 @ 13:26), Max: 36.7 (12-05-18 @ 13:26)  T(F): 98.1 (12-05-18 @ 13:26), Max: 98.1 (12-05-18 @ 13:26)  HR: 95 (12-05-18 @ 13:26) (86 - 104)  BP: 133/76 (12-05-18 @ 13:26) (133/76 - 149/86)  BP(mean): --  RR: 18 (12-05-18 @ 13:26) (18 - 19)  SpO2: 96% (12-05-18 @ 13:26) (96% - 98%)  GENERAL: Female laying in bed in NAD  Abdomen: Soft, nontender, non distended, central adiposity  Extremities: Warm, no edema in all 4 exts  NEURO: A&O X3    LABS:  POCT Blood Glucose.: 167 mg/dL (12-05-18 @ 12:30)  POCT Blood Glucose.: 208 mg/dL (12-05-18 @ 08:25)  POCT Blood Glucose.: 253 mg/dL (12-04-18 @ 22:15)  POCT Blood Glucose.: 86 mg/dL (12-04-18 @ 16:48)  POCT Blood Glucose.: 127 mg/dL (12-04-18 @ 12:32)  POCT Blood Glucose.: 202 mg/dL (12-04-18 @ 08:42)  POCT Blood Glucose.: 230 mg/dL (12-03-18 @ 22:08)  POCT Blood Glucose.: 134 mg/dL (12-03-18 @ 17:27)                                 13.5   14.24 )-----------( 274      ( 04 Dec 2018 07:58 )             42.9     12-05    137  |  96  |  33<H>  ----------------------------<  194<H>  5.8<H>   |  33<H>  |  1.18    Ca    9.7      05 Dec 2018 07:08    EGFR if : 55<L>  EGFR if non : 47<L>      Hemoglobin A1C, Whole Blood: 7.2 % <H> [4.0 - 5.6] (11-30-18 @ 07:58)

## 2018-12-05 NOTE — PROGRESS NOTE ADULT - ASSESSMENT
The patient is a 58-year-old woman with PMH of COPD on home O2 3L, HTN, HLD, CAD s/p x6 stents, Type 2 DM, PVD, p/w progressive worsening dyspnea x 2 weeks c/w COPD exacerbation not responsive to oral prednisone, now requiring IV steroids and bronchodilators.

## 2018-12-05 NOTE — PROGRESS NOTE ADULT - PROBLEM SELECTOR PLAN 7
Checking echocardiogram.  Low suspicion for DVT as edema is bilateral and patient on Lovenox, but will check dopplers.  Suspect may be related to recent high dose steroids.

## 2018-12-05 NOTE — PROGRESS NOTE ADULT - SUBJECTIVE AND OBJECTIVE BOX
SUBJECTIVE:  Seen with NP, Nafisa Hernandez.  OOB in chair.  Reports throat feeling less sore since starting Nystatin yesterday.  No odynophagia.  Still feels that breathing not close to baseline.  Also reports bilateral leg edema.  NAD.    MEDICATIONS  (STANDING):  ALBUTerol/ipratropium for Nebulization 3 milliLiter(s) Nebulizer <User Schedule>  artificial tears (preservative free) Ophthalmic Solution 1 Drop(s) Both EYES three times a day  aspirin enteric coated 81 milliGRAM(s) Oral daily  buDESOnide   0.5 milliGRAM(s) Respule 0.5 milliGRAM(s) Inhalation every 12 hours  cholecalciferol 2000 Unit(s) Oral daily  dextrose 5%. 1000 milliLiter(s) (50 mL/Hr) IV Continuous <Continuous>  dextrose 50% Injectable 12.5 Gram(s) IV Push once  dextrose 50% Injectable 25 Gram(s) IV Push once  dextrose 50% Injectable 25 Gram(s) IV Push once  enoxaparin Injectable 40 milliGRAM(s) SubCutaneous every 24 hours  insulin glargine Injectable (LANTUS) 18 Unit(s) SubCutaneous at bedtime  insulin lispro (HumaLOG) corrective regimen sliding scale   SubCutaneous three times a day before meals  insulin lispro (HumaLOG) corrective regimen sliding scale   SubCutaneous at bedtime  insulin lispro Injectable (HumaLOG) 10 Unit(s) SubCutaneous three times a day before meals  isosorbide   mononitrate ER Tablet (IMDUR) 30 milliGRAM(s) Oral daily  methylPREDNISolone sodium succinate Injectable 30 milliGRAM(s) IV Push every 8 hours  montelukast 10 milliGRAM(s) Oral daily  multivitamin 1 Tablet(s) Oral daily  nystatin    Suspension 959503 Unit(s) Oral four times a day  olmesartan 20 milliGRAM(s) Oral daily  rosuvastatin 10 milliGRAM(s) Oral at bedtime  theophylline ER Capsule 400 milliGRAM(s) Oral daily    MEDICATIONS  (PRN):  dextrose 40% Gel 15 Gram(s) Oral once PRN Blood Glucose LESS THAN 70 milliGRAM(s)/deciliter  glucagon  Injectable 1 milliGRAM(s) IntraMuscular once PRN Glucose LESS THAN 70 milligrams/deciliter  sodium chloride 0.65% Nasal 1 Spray(s) Both Nostrils two times a day PRN Nasal Congestion      Vital Signs Last 24 Hrs  T(C): 36.7 (05 Dec 2018 13:26), Max: 36.7 (05 Dec 2018 13:26)  T(F): 98.1 (05 Dec 2018 13:26), Max: 98.1 (05 Dec 2018 13:26)  HR: 95 (05 Dec 2018 13:26) (86 - 104)  BP: 133/76 (05 Dec 2018 13:26) (133/76 - 162/94)  BP(mean): --  RR: 18 (05 Dec 2018 13:26) (18 - 19)  SpO2: 96% (05 Dec 2018 13:26) (96% - 98%)    CAPILLARY BLOOD GLUCOSE      POCT Blood Glucose.: 167 mg/dL (05 Dec 2018 12:30)  POCT Blood Glucose.: 208 mg/dL (05 Dec 2018 08:25)  POCT Blood Glucose.: 253 mg/dL (04 Dec 2018 22:15)  POCT Blood Glucose.: 86 mg/dL (04 Dec 2018 16:48)    I&O's Summary    04 Dec 2018 07:01  -  05 Dec 2018 07:00  --------------------------------------------------------  IN: 1207 mL / OUT: 0 mL / NET: 1207 mL        PHYSICAL EXAM:  GENERAL: Looks stated age, NAD  ENT: Small white patches on left buccal mucosa.  Patches on right buccal mucosa that were visualized yesterday no longer seen today.  Pharynx not erythematous.  CARDIOVASCULAR: Normal S1, S2  PULMONARY: Speaking in full sentences on oxygen via NC.  Breath sounds very distant, but lungs clear to auscultation bilaterally. No wheezes/rales/rhonchi  GI: Abdomen soft, Nontender. Bowel sounds present  MSK/Ext:  Mild 1+ leg edema bilaterally.  No calf tenderness bilaterally  PSYCH: Normal Affect.      LABS:                        13.5   14.24 )-----------( 274      ( 04 Dec 2018 07:58 )             42.9     12-05    137  |  96  |  33<H>  ----------------------------<  194<H>  5.8<H>   |  33<H>  |  1.18    Ca    9.7      05 Dec 2018 07:08                  RADIOLOGY & ADDITIONAL TESTS:    < from: CT Angio Chest w/ IV Cont (12.04.18 @ 16:30) >  IMPRESSION:   1.  No pulmonary embolism.  2. Emphysematous changes of the lung.    < end of copied text >

## 2018-12-05 NOTE — PROGRESS NOTE ADULT - SUBJECTIVE AND OBJECTIVE BOX
[facility]     PROGRESS NOTE    CHIEF COMPLAINT: chronic hypoxic/hypercapnic respiratory failure; COPD exacerbation; emphysema; dyspnea    INTERVAL HISTORY: in bed yelling at the kitchen on the phone without shortness of breath; states that she is still quite symptomatic walking to the bathroom; mild cough with sputum production; no chest congestion or wheeze; no fevers, chills or sweats; no chest pain/pressure or palpitations; hoarseness improved; improved glucose control; CTA without PE; mild knee swelling without discomfort    REVIEW OF SYSTEMS:  Constitutional: As per interval history  HEENT: hoarse  CV: As per interval history  Resp: As per interval history  GI: Within normal limits   : Within normal limits  Musculoskeletal: pain/numbness lower extremities with ambulation  Skin: Within normal limits  Neurological: Within normal limits  Psychiatric: Within normal limits  Endocrine: hyperglycemia  Hematologic/Lymphatic: Within normal limits  Allergic/Immunologic: Within normal limits    MEDICATIONS:     Pulmonary "  ALBUTerol/ipratropium for Nebulization 3 milliLiter(s) Nebulizer <User Schedule>  buDESOnide   0.5 milliGRAM(s) Respule 0.5 milliGRAM(s) Inhalation every 12 hours  montelukast 10 milliGRAM(s) Oral daily  theophylline ER Capsule 400 milliGRAM(s) Oral daily      Anti-microbials:  nystatin    Suspension 250670 Unit(s) Oral four times a day      Cardiovascular:  isosorbide   mononitrate ER Tablet (IMDUR) 30 milliGRAM(s) Oral daily  olmesartan 20 milliGRAM(s) Oral daily      Other:  artificial tears (preservative free) Ophthalmic Solution 1 Drop(s) Both EYES three times a day  aspirin enteric coated 81 milliGRAM(s) Oral daily  cholecalciferol 2000 Unit(s) Oral daily  dextrose 40% Gel 15 Gram(s) Oral once PRN  dextrose 5%. 1000 milliLiter(s) IV Continuous <Continuous>  dextrose 50% Injectable 12.5 Gram(s) IV Push once  dextrose 50% Injectable 25 Gram(s) IV Push once  dextrose 50% Injectable 25 Gram(s) IV Push once  enoxaparin Injectable 40 milliGRAM(s) SubCutaneous every 24 hours  glucagon  Injectable 1 milliGRAM(s) IntraMuscular once PRN  insulin glargine Injectable (LANTUS) 15 Unit(s) SubCutaneous at bedtime  insulin lispro (HumaLOG) corrective regimen sliding scale   SubCutaneous three times a day before meals  insulin lispro (HumaLOG) corrective regimen sliding scale   SubCutaneous at bedtime  insulin lispro Injectable (HumaLOG) 10 Unit(s) SubCutaneous three times a day before meals  methylPREDNISolone sodium succinate Injectable 30 milliGRAM(s) IV Push every 8 hours  multivitamin 1 Tablet(s) Oral daily  rosuvastatin 10 milliGRAM(s) Oral at bedtime  sodium chloride 0.65% Nasal 1 Spray(s) Both Nostrils two times a day PRN        OBJECTIVE:    I&O's Detail    04 Dec 2018 07:01  -  05 Dec 2018 07:00  --------------------------------------------------------  IN:    Oral Fluid: 1207 mL  Total IN: 1207 mL    OUT:  Total OUT: 0 mL    Total NET: 1207 mL    POCT Blood Glucose.: 208 mg/dL (05 Dec 2018 08:25)  POCT Blood Glucose.: 253 mg/dL (04 Dec 2018 22:15)  POCT Blood Glucose.: 86 mg/dL (04 Dec 2018 16:48)  POCT Blood Glucose.: 127 mg/dL (04 Dec 2018 12:32)      PHYSICAL EXAM:       ICU Vital Signs Last 24 Hrs  T(C): 36.4 (05 Dec 2018 06:16), Max: 36.7 (04 Dec 2018 09:17)  T(F): 97.6 (05 Dec 2018 06:16), Max: 98.1 (04 Dec 2018 09:17)  HR: 86 (05 Dec 2018 06:16) (82 - 104)  BP: 149/86 (05 Dec 2018 06:16) (128/76 - 162/94)  BP(mean): --  ABP: --  ABP(mean): --  RR: 19 (05 Dec 2018 06:16) (18 - 19)  SpO2: 98% (05 Dec 2018 06:16) (97% - 98%) on 4lpm nasal canula at rest    General: Awake. Alert. Cooperative. No distress. Appears stated age. Obese 	  HEENT:  Atraumatic. Normocephalic. Anicteric. Normal oral mucosa. PERRL. EOMI.  Neck: Supple. Trachea midline. Thyroid without enlargement/tenderness/nodules. No carotid bruit. No JVD.	  Cardiovascular: Regular rate and rhythm. Distant S1 S2. No murmurs, rubs or gallops.  Respiratory: Respirations unlabored. Markedly decreased breath sounds throughout. Prolonged expiratory phase of respiration. Right basilar rales. No curvature.  Abdomen: Soft. Non-tender. Non-distended. No organomegaly. No masses. Normal bowel sounds. Obese.  Extremities: Warm to touch. No clubbing or cyanosis. Minimal edema around the knees  Pulses: Decreased lower extremity peripheral pulses  Skin: Erythematous palms. No rashes or lesions. No ecchymoses. No cyanosis. Warm to touch.  Lymph Nodes: Cervical, supraclavicular and axillary nodes normal  Neurological: Motor and sensory examination equal and normal. A and O x 3  Psychiatry: Appropriate mood and affect.       LABS:                        13.5   14.24 )-----------( 274      ( 04 Dec 2018 07:58 )             42.9     12-05    137  |  96  |  33<H>  ----------------------------<  194<H>  5.8<H>   |  33<H>  |  1.18    12-04    137  |  95<L>  |  33<H>  ----------------------------<  200<H>  4.9   |  34<H>  |  1.10    Ca      9.7      12-05    Ca      9.7      12-04      Venous Blood Gas:  11-29 @ 14:10  7.39/62/67/37/93  VBG Lactate: 1.7    < from: Transthoracic Echocardiogram (02.18.14 @ 08:32) >    Patient name: GUY HARTMAN  YOB: 1960   Age: 54 (F)   MR#: [id number]  Study Date: 2/18/2014  Location: [address] [place], [place] [id number]: Lashanda Geller [facility]  Study quality: Technically difficult  Referring Physician: John Gifford MD  Blood Pressure: 149/78 mmHg  Height: 5ft 6in  Weight: 195 lb  BSA: 2 m2  ------------------------------------------------------------------------  PROCEDURE: Transthoracic echocardiogram with 2-D, M-Mode  and complete spectral and color flow Doppler.  INDICATION: Dyspnea/SOB (786.09)  ------------------------------------------------------------------------  Dimensions:    Normal Values:  LA:     3.8    2.0 - 4.0 cm  Ao:     3.3    2.0 - 3.8 cm  SEPTUM: 0.9    0.6 - 1.2 cm  PWT:    0.9    0.6 - 1.1 cm  LVIDd:  4.9    3.0 - 5.6 cm  LVIDs:  3.2    1.8 - 4.0 cm  Derived variables:  LVMI: 77 g/m2  RWT: 0.36  Fractional short: 35 %  Ejection Fraction: 64 %  ------------------------------------------------------------------------  Observations:  Mitral Valve: Normal mitral valve. No significant mitral  valve regurgitation seen.  Aortic Valve/Aorta: Aortic valve not well visualized.  Normal aortic root.  Left Atrium: Normal left atrium.  LA volume index = 21  cc/m2.  Left Ventricle: Endocardium not well visualized; grossly  normal left ventricular systolic function. Normal left  ventricular internal dimensions and wall thicknesses.  Right Heart: Normal right atrium. Normal right ventricular  size and function. Tricuspid valve not well visualized,  probably normal. Trace tricuspid regurgitation. Pulmonic  valve not well visualized.  Hemodynamic: Estimated right atrial pressure is 10 mm Hg.  Inadequate tricuspid regurgitation Doppler envelope  precludes estimation of RVSP.  ------------------------------------------------------------------------  Conclusions:  1. Normal mitral valve. No significant mitral valve  regurgitation seen.  2. Aortic valve not well visualized.  3. Endocardium not well visualized; grossly normal left  ventricular systolic function.  4. Inadequate tricuspid regurgitation Doppler envelope  precludes estimation of RVSP.  5. Tricuspid valve not well visualized, probably normal.  Trace tricuspid regurgitation.  ------------------------------------------------------------------------  Confirmed on  2/18/2014 - 10:13:08 by David Schumacher M.D.  ------------------------------------------------------------------------    < end of copied text >  ---------------------------------------------------------------------------------------------------------------    MICROBIOLOGY:     Rapid Respiratory Viral Panel (11.30.18 @ 01:30)    Rapid RVP Result: [facility] The [employer] RVP Rapid uses polymerase chain reaction (PCR) and melt  curve analysis to screen for adenovirus; coronavirus HKU1, NL63, 229E,  OC43; human metapneumovirus (hMPV); human enterovirus/rhinovirus  (Entero/RV); influenza A; influenza A/H1;influenza A/H3; influenza  A/H1-2009; influenza B; parainfluenza viruses 1, 2, 3, 4; respiratory  syncytial virus; Bordetella pertussis; Mycoplasma pneumoniae; and  Chlamydophila pneumoniae.    RADIOLOGY:  [x] Chest radiographs reviewed and interpreted by me    < from: CT Angio Chest w/ IV Cont (12.04.18 @ 16:30) >    EXAM:  CT ANGIO CHEST (W)AW IC                          PROCEDURE DATE:  12/04/2018      INTERPRETATION:  CT CHEST WITH CONTRAST    INDICATION: Known COPD not responding to steroids and bronchodilators.   Progressively worsening dyspnea. Evaluate for pulmonary embolism.    TECHNIQUE: Enhanced helical images were obtained of the chest. Coronal   and sagittal images were reconstructed. Maximum intensity projection   images were generated. Images were obtained after the uneventful   administration of 60 cc nonionic intravenous Omnipaque 350.  40 cc of   Omnipaque 350 was discarded.    COMPARISON: CT chest 2/18/2014.    FINDINGS:     Lungs And Airways: Emphysematous changes of the lung.      The airways are unremarkable.      Pleura: No pneumothorax. No pleural effusion.    Mediastinum: There are no enlarged chest lymph nodes. The visualized   portion of the thyroid gland is unremarkable.       Heart and Vasculature: No pulmonary embolism.    The main pulmonary artery is normal in caliber. No thoracic aortic   aneurysm or dissection. Atheromatous disease of the aorta.    The heart is normal in size.  There is no pericardial effusion.   Coronary artery disease with calcified plaque involving the right and   left main coronary arteries and the left anterior descending artery.      Upper Abdomen: The upper abdomen is unremarkable.    Bones And Soft Tissues: Degenerative changes of the spine.  The soft   tissues are unremarkable.      IMPRESSION:   1.  No pulmonary embolism.  2. Emphysematous changes of the lung.    DIANA MEDINA M.D., RADIOLOGY RESIDENT  This document has been electronically signed.  JEYSON SANCHEZ M.D., ATTENDING RADIOLOGIST  This document has been electronically signed. Dec  4 2018  5:21PM      < end of copied text >  ---------------------------------------------------------------------------------------------------------------  < from: Xray Chest 1 View AP/PA (11.29.18 @ 14:53) >    EXAM:  XR CHEST AP OR PA 1V                          PROCEDURE DATE:  11/29/2018      INTERPRETATION:  A single chest x-ray was obtained on November 29, 2018.    Indication: Shortness of breath.    Impression:    The heart is normal in size. The lungs are clear. The visualized bones   are normal.    ALEX CUELLAR M.D., ATTENDING RADIOLOGIST  This document has been electronically signed. Nov 29 2018  2:59PM    < end of copied text >  ---------------------------------------------------------------------------------------------------------------  SPIROMETRY:     FEV1 0.56 liters - 22% predicted  FVC 1.73 liters - 52% predicted  FEV1% 32    c/w very severe obstructive lung disease

## 2018-12-05 NOTE — PROGRESS NOTE ADULT - ASSESSMENT
59 y/o F w/h/o controlled T2DM on Metformin 500mg bid. Also h/o CAD and COPD. Here with COPD exacerbation on Methylprednisolone 40mg q8h. Still c/o SOB on and off. Tolerating POs with variable PO intake> dislikes hospital food and not getting food choices> spoke with .  Glycemic control remains variable but noted persistent hyperglcemia at bedtime and fasting. Will increase Lantus dose. BG goal 100 to 200 while on steroids. No hypoglycemia.

## 2018-12-06 LAB
ANION GAP SERPL CALC-SCNC: 9 MMOL/L — SIGNIFICANT CHANGE UP (ref 5–17)
BUN SERPL-MCNC: 33 MG/DL — HIGH (ref 7–23)
CALCIUM SERPL-MCNC: 9.6 MG/DL — SIGNIFICANT CHANGE UP (ref 8.4–10.5)
CHLORIDE SERPL-SCNC: 91 MMOL/L — LOW (ref 96–108)
CO2 SERPL-SCNC: 36 MMOL/L — HIGH (ref 22–31)
CREAT SERPL-MCNC: 1.05 MG/DL — SIGNIFICANT CHANGE UP (ref 0.5–1.3)
GLUCOSE BLDC GLUCOMTR-MCNC: 107 MG/DL — HIGH (ref 70–99)
GLUCOSE BLDC GLUCOMTR-MCNC: 121 MG/DL — HIGH (ref 70–99)
GLUCOSE BLDC GLUCOMTR-MCNC: 215 MG/DL — HIGH (ref 70–99)
GLUCOSE BLDC GLUCOMTR-MCNC: 247 MG/DL — HIGH (ref 70–99)
GLUCOSE BLDC GLUCOMTR-MCNC: 62 MG/DL — LOW (ref 70–99)
GLUCOSE BLDC GLUCOMTR-MCNC: 65 MG/DL — LOW (ref 70–99)
GLUCOSE SERPL-MCNC: 301 MG/DL — HIGH (ref 70–99)
GRAM STN FLD: SIGNIFICANT CHANGE UP
HCT VFR BLD CALC: 40.4 % — SIGNIFICANT CHANGE UP (ref 34.5–45)
HGB BLD-MCNC: 14 G/DL — SIGNIFICANT CHANGE UP (ref 11.5–15.5)
MCHC RBC-ENTMCNC: 30.7 PG — SIGNIFICANT CHANGE UP (ref 27–34)
MCHC RBC-ENTMCNC: 34.6 GM/DL — SIGNIFICANT CHANGE UP (ref 32–36)
MCV RBC AUTO: 88.8 FL — SIGNIFICANT CHANGE UP (ref 80–100)
PLATELET # BLD AUTO: 273 K/UL — SIGNIFICANT CHANGE UP (ref 150–400)
POTASSIUM SERPL-MCNC: 5.2 MMOL/L — SIGNIFICANT CHANGE UP (ref 3.5–5.3)
POTASSIUM SERPL-SCNC: 5.2 MMOL/L — SIGNIFICANT CHANGE UP (ref 3.5–5.3)
RBC # BLD: 4.55 M/UL — SIGNIFICANT CHANGE UP (ref 3.8–5.2)
RBC # FLD: 13 % — SIGNIFICANT CHANGE UP (ref 10.3–14.5)
SODIUM SERPL-SCNC: 136 MMOL/L — SIGNIFICANT CHANGE UP (ref 135–145)
SPECIMEN SOURCE: SIGNIFICANT CHANGE UP
WBC # BLD: 12.5 K/UL — HIGH (ref 3.8–10.5)
WBC # FLD AUTO: 12.5 K/UL — HIGH (ref 3.8–10.5)

## 2018-12-06 PROCEDURE — 93970 EXTREMITY STUDY: CPT | Mod: 26

## 2018-12-06 PROCEDURE — 99232 SBSQ HOSP IP/OBS MODERATE 35: CPT

## 2018-12-06 RX ORDER — INSULIN LISPRO 100/ML
10 VIAL (ML) SUBCUTANEOUS
Qty: 0 | Refills: 0 | Status: DISCONTINUED | OUTPATIENT
Start: 2018-12-06 | End: 2018-12-09

## 2018-12-06 RX ORDER — CLARITHROMYCIN 500 MG
500 TABLET ORAL
Qty: 0 | Refills: 0 | Status: DISCONTINUED | OUTPATIENT
Start: 2018-12-06 | End: 2018-12-12

## 2018-12-06 RX ORDER — INSULIN LISPRO 100/ML
8 VIAL (ML) SUBCUTANEOUS
Qty: 0 | Refills: 0 | Status: DISCONTINUED | OUTPATIENT
Start: 2018-12-06 | End: 2018-12-09

## 2018-12-06 RX ADMIN — Medication 10 UNIT(S): at 13:29

## 2018-12-06 RX ADMIN — Medication 500000 UNIT(S): at 23:12

## 2018-12-06 RX ADMIN — Medication 500 MILLIGRAM(S): at 21:41

## 2018-12-06 RX ADMIN — Medication 500000 UNIT(S): at 15:51

## 2018-12-06 RX ADMIN — OLMESARTAN MEDOXOMIL 20 MILLIGRAM(S): 5 TABLET, FILM COATED ORAL at 06:29

## 2018-12-06 RX ADMIN — INSULIN GLARGINE 17 UNIT(S): 100 INJECTION, SOLUTION SUBCUTANEOUS at 21:41

## 2018-12-06 RX ADMIN — Medication 1 DROP(S): at 21:42

## 2018-12-06 RX ADMIN — Medication 0.5 MILLIGRAM(S): at 21:41

## 2018-12-06 RX ADMIN — ISOSORBIDE MONONITRATE 30 MILLIGRAM(S): 60 TABLET, EXTENDED RELEASE ORAL at 13:42

## 2018-12-06 RX ADMIN — Medication 30 MILLIGRAM(S): at 13:44

## 2018-12-06 RX ADMIN — Medication 400 MILLIGRAM(S): at 13:44

## 2018-12-06 RX ADMIN — Medication 3 MILLILITER(S): at 06:28

## 2018-12-06 RX ADMIN — Medication 0.5 MILLIGRAM(S): at 06:28

## 2018-12-06 RX ADMIN — Medication 1 DROP(S): at 13:28

## 2018-12-06 RX ADMIN — Medication 4: at 09:00

## 2018-12-06 RX ADMIN — Medication 4: at 13:30

## 2018-12-06 RX ADMIN — Medication 30 MILLIGRAM(S): at 06:28

## 2018-12-06 RX ADMIN — Medication 1 DROP(S): at 06:28

## 2018-12-06 RX ADMIN — ENOXAPARIN SODIUM 40 MILLIGRAM(S): 100 INJECTION SUBCUTANEOUS at 21:41

## 2018-12-06 RX ADMIN — Medication 3 MILLILITER(S): at 13:46

## 2018-12-06 RX ADMIN — Medication 10 UNIT(S): at 09:01

## 2018-12-06 RX ADMIN — Medication 81 MILLIGRAM(S): at 13:42

## 2018-12-06 RX ADMIN — Medication 500000 UNIT(S): at 06:28

## 2018-12-06 RX ADMIN — Medication 1 TABLET(S): at 15:50

## 2018-12-06 RX ADMIN — Medication 10 UNIT(S): at 18:18

## 2018-12-06 RX ADMIN — Medication 500000 UNIT(S): at 18:17

## 2018-12-06 RX ADMIN — Medication 2000 UNIT(S): at 13:42

## 2018-12-06 RX ADMIN — Medication 20 MILLIGRAM(S): at 23:12

## 2018-12-06 RX ADMIN — Medication 3 MILLILITER(S): at 10:58

## 2018-12-06 RX ADMIN — Medication 0: at 21:41

## 2018-12-06 RX ADMIN — Medication 3 MILLILITER(S): at 23:11

## 2018-12-06 RX ADMIN — Medication 20 MILLIGRAM(S): at 18:17

## 2018-12-06 RX ADMIN — MONTELUKAST 10 MILLIGRAM(S): 4 TABLET, CHEWABLE ORAL at 13:42

## 2018-12-06 RX ADMIN — ROSUVASTATIN CALCIUM 10 MILLIGRAM(S): 5 TABLET ORAL at 21:41

## 2018-12-06 NOTE — PROVIDER CONTACT NOTE (OTHER) - SITUATION
Pt with Pt with FS of 65 and repeat of 62 at dinner time. Non-symptomatic. 4oz of apple juice given at repeat FS of 121 at 17:51

## 2018-12-06 NOTE — PROGRESS NOTE ADULT - ASSESSMENT
59 y/o F w/h/o controlled T2DM on Metformin 500mg bid. Also h/o CAD and COPD. Here with COPD exacerbation on Methylprednisolone 20mg IV q6 hour now.

## 2018-12-06 NOTE — PROGRESS NOTE ADULT - PROBLEM SELECTOR PLAN 1
-Test BG ac and hs  -Increase Lantus dose to 17 qhs plus Humalog 8 units ac B&L and 10 units with dinner. HOLD IF NOT EATING, if eating half meal give half of the ordered insulin only.    -Continue Humalog moderate correction scale ac and hs  -Please contact endo team with any changes on steroid therapy!  -Plan discussed with pt/team.

## 2018-12-06 NOTE — PROGRESS NOTE ADULT - PROBLEM SELECTOR PLAN 3
A1C 7.2, but with hyperglycemia in setting of Solumedrol use.  Continue Lantus 17 units with Humalog 10 units pre-meal per endocrine.  Continue to monitor blood sugars.

## 2018-12-06 NOTE — PROGRESS NOTE ADULT - ASSESSMENT
ASSESSMENT:    slowly improving dyspnea with chronic hypoxic and hypercapnic respiratory failure due to very severe COPD with "exacerbation" - adequate oxygenation on nasal canula in the setting of profound dyspnea suggests that alterations in the mechanics of breathing due to lung hyperinflation are playing a significant role in shortness of breath - there is evidence of CAD without pulmonary hypertension on the CT scan    HTN/HLD/DM    CAD s/p PCI    PAD    lower extremity swelling likely related to steroid induced fluid retention - no evidence of DVT on lower extremity Duplex examination    PLAN/RECOMMENDATIONS:    oxygen supplementation to keep saturation greater than 92%  CTA chest without evidence of pulmonary emboli  sputum culture if patient is able to expectorate a specimen - biaxin 500mg 2 times daily  home trilogy vent has been arranged with [facility] - the patient is unwilling to use the hospital BIPAP machine  would consider low dose narcotics for dyspnea watching closely for sedation or worsening hypercapnia - would not start without consultation with Dr. Mathew  albuterol/atrovent nebs q4h holding 2AM dose  pulmicort 0.5mg nebs q12h  singulair/theophylline - off mucinex  decrease solumedrol to 20mg IVP q6h - glucose control on high dose steroids  cardiac meds: ASA/crestor/imdur/olmesartan  cardiology evaluation - ECHO pending  DVT prophylaxais - SQ lovenox  outpatient evaluation ongoing for lung transplantation  thigh high TOMAS stockings  continue outpatient pulmonary rehabilitation    Will follow with you. Plan of care discussed with the patient at bedside and the dedicated floor NP. She will take many months to return to close to her baseline respiratory status following this exacerbation.    David Fair MD, [facility] - [phone]  Pulmonary Medicine

## 2018-12-06 NOTE — PROVIDER CONTACT NOTE (OTHER) - ACTION/TREATMENT ORDERED:
NP & endocronologist made aware. Premeal dinner insulin lowered to 10units. Pt reeducated about importance of eating food when insulin is given.

## 2018-12-06 NOTE — PROGRESS NOTE ADULT - PROBLEM SELECTOR PLAN 1
Seems to be slowly improving.  Continue solumedrol 30mg q8 hours.  CTA with no PE, no PNA.  Continue Pulmicort, Duoneb ATC (with transition back to spiriva upon discharge), Theophylline, and Singulair.  Awaiting echo, sputum culture.  Home Trilogy vent being arranged by pulmonary.

## 2018-12-06 NOTE — PROGRESS NOTE ADULT - PROBLEM SELECTOR PLAN 6
Potassium now in the high normal range.  Continue low potassium diet yesterday.  Trend BMP.  Perhaps hyperkalemia secondary to Benicar, but per patient she has been on this medication for a long time with no prior issues with hyperkalemia.  Consider renal consult if hyperkalemia recurs.

## 2018-12-06 NOTE — PROGRESS NOTE ADULT - ASSESSMENT
The patient is a 58-year-old woman with PMH of COPD on home O2 3L, HTN, HLD, CAD s/p 6 stents, Type 2 DM, PVD, p/w progressive worsening dyspnea x 2 weeks c/w COPD exacerbation not responsive to oral prednisone, now requiring IV steroids and bronchodilators.

## 2018-12-06 NOTE — PROGRESS NOTE ADULT - SUBJECTIVE AND OBJECTIVE BOX
Contact info:   Dony Metz MD  pager [phone], please provide 10 digit call back number.   Please note that this patient may be followed by another provider tomorrow.   If no answer or after hours, please contact [phone]     59 y/o F w/h/o controlled T2DM on Metformin 500mg bid. Also h/o CAD and COPD. Here with COPD exacerbation on Methylprednisolone 30mg q8h. Glycemic control variable depending on PO intake and per staff pt eats at different times of the day.   She didn't eat much lunch today, went down for vascular study, received Humalog 10 units, had hypo today.      MEDICATIONS  (STANDING):  ALBUTerol/ipratropium for Nebulization 3 milliLiter(s) Nebulizer <User Schedule>  artificial tears (preservative free) Ophthalmic Solution 1 Drop(s) Both EYES three times a day  aspirin enteric coated 81 milliGRAM(s) Oral daily  buDESOnide   0.5 milliGRAM(s) Respule 0.5 milliGRAM(s) Inhalation every 12 hours  cholecalciferol 2000 Unit(s) Oral daily  dextrose 5%. 1000 milliLiter(s) (50 mL/Hr) IV Continuous <Continuous>  dextrose 50% Injectable 12.5 Gram(s) IV Push once  dextrose 50% Injectable 25 Gram(s) IV Push once  dextrose 50% Injectable 25 Gram(s) IV Push once  enoxaparin Injectable 40 milliGRAM(s) SubCutaneous every 24 hours  insulin glargine Injectable (LANTUS) 17 Unit(s) SubCutaneous at bedtime  insulin lispro (HumaLOG) corrective regimen sliding scale   SubCutaneous three times a day before meals  insulin lispro (HumaLOG) corrective regimen sliding scale   SubCutaneous at bedtime  insulin lispro Injectable (HumaLOG) 10 Unit(s) SubCutaneous before breakfast  insulin lispro Injectable (HumaLOG) 10 Unit(s) SubCutaneous before lunch  insulin lispro Injectable (HumaLOG) 10 Unit(s) SubCutaneous before dinner  isosorbide   mononitrate ER Tablet (IMDUR) 30 milliGRAM(s) Oral daily  methylPREDNISolone sodium succinate Injectable 20 milliGRAM(s) IV Push every 6 hours  montelukast 10 milliGRAM(s) Oral daily  multivitamin 1 Tablet(s) Oral daily  nystatin    Suspension 320361 Unit(s) Oral four times a day  olmesartan 20 milliGRAM(s) Oral daily  rosuvastatin 10 milliGRAM(s) Oral at bedtime  theophylline ER Capsule 400 milliGRAM(s) Oral daily    MEDICATIONS  (PRN):  dextrose 40% Gel 15 Gram(s) Oral once PRN Blood Glucose LESS THAN 70 milliGRAM(s)/deciliter  glucagon  Injectable 1 milliGRAM(s) IntraMuscular once PRN Glucose LESS THAN 70 milligrams/deciliter  sodium chloride 0.65% Nasal 1 Spray(s) Both Nostrils two times a day PRN Nasal Congestion      Allergies    No Known Allergies    Intolerances      Review of Systems:  General: States breathing still the same. reports not getting better> Pt states she needs a lung transplant  Cardiovascular: No chest pain, palpitations  Respiratory: On and off SOB, no cough  GI: No nausea, vomiting, abdominal pain  Endocrine: no polyuria, polydipsia or S&Sx of hypoglycemia    ALL OTHER SYSTEMS REVIEWED AND NEGATIVE    PHYSICAL EXAM:  VITALS: T(C): 36.9 (12-06-18 @ 12:49)  T(F): 98.5 (12-06-18 @ 12:49), Max: 98.5 (12-06-18 @ 12:49)  HR: 89 (12-06-18 @ 12:49) (82 - 95)  BP: 132/79 (12-06-18 @ 12:49) (129/75 - 160/90)  RR:  (18 - 18)  SpO2:  (97% - 100%)  Wt(kg): --  GENERAL: Female laying in bed in NAD  Abdomen: Soft, nontender, non distended, central adiposity  Extremities: Warm, no edema in all 4 exts  NEURO: A&O X3    POCT Blood Glucose.: 62 mg/dL (12-06-18 @ 17:19)  POCT Blood Glucose.: 65 mg/dL (12-06-18 @ 17:16)  POCT Blood Glucose.: 215 mg/dL (12-06-18 @ 12:41)  POCT Blood Glucose.: 247 mg/dL (12-06-18 @ 08:28)  POCT Blood Glucose.: 360 mg/dL (12-05-18 @ 22:05)  POCT Blood Glucose.: 71 mg/dL (12-05-18 @ 17:09)  POCT Blood Glucose.: 68 mg/dL (12-05-18 @ 17:08)  POCT Blood Glucose.: 167 mg/dL (12-05-18 @ 12:30)  POCT Blood Glucose.: 208 mg/dL (12-05-18 @ 08:25)  POCT Blood Glucose.: 253 mg/dL (12-04-18 @ 22:15)  POCT Blood Glucose.: 86 mg/dL (12-04-18 @ 16:48)  POCT Blood Glucose.: 127 mg/dL (12-04-18 @ 12:32)  POCT Blood Glucose.: 202 mg/dL (12-04-18 @ 08:42)  POCT Blood Glucose.: 230 mg/dL (12-03-18 @ 22:08)      12-06    136  |  91<L>  |  33<H>  ----------------------------<  301<H>  5.2   |  36<H>  |  1.05    EGFR if : 67  EGFR if non : 58<L>    Ca    9.6      12-06    Thyroid Function Tests:      Hemoglobin A1C, Whole Blood: 7.2 % <H> [4.0 - 5.6] (11-30-18 @ 07:58)

## 2018-12-06 NOTE — PROGRESS NOTE ADULT - SUBJECTIVE AND OBJECTIVE BOX
SUBJECTIVE:  Seen with NP Nafisa Hernandez.  Feels breathing unchanged from yesterday.  Still concerned about the bilateral leg edema that the patient says is new, but unchanged from yesterday.  Throat feeling less sore today.  NAD.    MEDICATIONS  (STANDING):  ALBUTerol/ipratropium for Nebulization 3 milliLiter(s) Nebulizer <User Schedule>  artificial tears (preservative free) Ophthalmic Solution 1 Drop(s) Both EYES three times a day  aspirin enteric coated 81 milliGRAM(s) Oral daily  buDESOnide   0.5 milliGRAM(s) Respule 0.5 milliGRAM(s) Inhalation every 12 hours  cholecalciferol 2000 Unit(s) Oral daily  dextrose 5%. 1000 milliLiter(s) (50 mL/Hr) IV Continuous <Continuous>  dextrose 50% Injectable 12.5 Gram(s) IV Push once  dextrose 50% Injectable 25 Gram(s) IV Push once  dextrose 50% Injectable 25 Gram(s) IV Push once  enoxaparin Injectable 40 milliGRAM(s) SubCutaneous every 24 hours  insulin glargine Injectable (LANTUS) 17 Unit(s) SubCutaneous at bedtime  insulin lispro (HumaLOG) corrective regimen sliding scale   SubCutaneous three times a day before meals  insulin lispro (HumaLOG) corrective regimen sliding scale   SubCutaneous at bedtime  insulin lispro Injectable (HumaLOG) 10 Unit(s) SubCutaneous before breakfast  insulin lispro Injectable (HumaLOG) 10 Unit(s) SubCutaneous before lunch  insulin lispro Injectable (HumaLOG) 12 Unit(s) SubCutaneous before dinner  isosorbide   mononitrate ER Tablet (IMDUR) 30 milliGRAM(s) Oral daily  methylPREDNISolone sodium succinate Injectable 30 milliGRAM(s) IV Push every 8 hours  montelukast 10 milliGRAM(s) Oral daily  multivitamin 1 Tablet(s) Oral daily  nystatin    Suspension [id number] Unit(s) Oral four times a day  olmesartan 20 milliGRAM(s) Oral daily  rosuvastatin 10 milliGRAM(s) Oral at bedtime  theophylline ER Capsule 400 milliGRAM(s) Oral daily    MEDICATIONS  (PRN):  dextrose 40% Gel 15 Gram(s) Oral once PRN Blood Glucose LESS THAN 70 milliGRAM(s)/deciliter  glucagon  Injectable 1 milliGRAM(s) IntraMuscular once PRN Glucose LESS THAN 70 milligrams/deciliter  sodium chloride 0.65% Nasal 1 Spray(s) Both Nostrils two times a day PRN Nasal Congestion      Vital Signs Last 24 Hrs  T(C): 36.9 (06 Dec 2018 12:49), Max: 37 (05 Dec 2018 17:00)  T(F): 98.5 (06 Dec 2018 12:49), Max: 98.6 (05 Dec 2018 17:00)  HR: 89 (06 Dec 2018 12:49) (82 - 95)  BP: 132/79 (06 Dec 2018 12:49) (129/75 - 160/90)  BP(mean): --  RR: 18 (06 Dec 2018 12:49) (18 - 18)  SpO2: 98% (06 Dec 2018 12:49) (97% - 100%)    CAPILLARY BLOOD GLUCOSE      POCT Blood Glucose.: 215 mg/dL (06 Dec 2018 12:41)  POCT Blood Glucose.: 247 mg/dL (06 Dec 2018 08:28)  POCT Blood Glucose.: 360 mg/dL (05 Dec 2018 22:05)  POCT Blood Glucose.: 71 mg/dL (05 Dec 2018 17:09)  POCT Blood Glucose.: 68 mg/dL (05 Dec 2018 17:08)    I&O's Summary    05 Dec 2018 07:01  -  06 Dec 2018 07:00  --------------------------------------------------------  IN: 1680 mL / OUT: 0 mL / NET: 1680 mL    06 Dec 2018 07:01  -  06 Dec 2018 14:01  --------------------------------------------------------  IN: 240 mL / OUT: 0 mL / NET: 240 mL        PHYSICAL EXAM:  GENERAL: Looks stated age, NAD  CARDIOVASCULAR: Normal S1, S2  PULMONARY: Decreased breath sounds, but lungs clear to auscultation bilaterally. No wheezes/rales/rhonchi  GI: Abdomen soft, Nontender. Bowel sounds present  MSK/Ext:  1+ leg edema.  No calf tenderness bilaterally  PSYCH: Normal Affect.      LABS:                        14.0   12.5  )-----------( 273      ( 06 Dec 2018 11:23 )             40.4     12-06    136  |  91<L>  |  33<H>  ----------------------------<  301<H>  5.2   |  36<H>  |  1.05    Ca    9.6      06 Dec 2018 11:20      Culture - Sputum . (12.05.18 @ 22:50)    Gram Stain:   Moderate polymorphonuclear leukocytes per low power field  Few Squamous epithelial cells per low power field  Moderate Gram Positive Cocci in Pairs and Chains per oil power field    Specimen Source: .Sputum Sputum                RADIOLOGY & ADDITIONAL TESTS:

## 2018-12-06 NOTE — PROGRESS NOTE ADULT - SUBJECTIVE AND OBJECTIVE BOX
[facility]     PROGRESS NOTE    CHIEF COMPLAINT: chronic hypoxic/hypercapnic respiratory failure; COPD exacerbation; emphysema; dyspnea    INTERVAL HISTORY: no shortness of breath at rest or with talking on 4lpm nasal canula; still become quite short of breath with minimal exertion; mild cough with sputum production; no chest congestion or wheeze; no fevers, chills or sweats; no chest pain/pressure or palpitations; hoarseness improved; improved glucose control; CTA without PE; increasing leg swelling;     REVIEW OF SYSTEMS:  Constitutional: As per interval history  HEENT: hoarse  CV: As per interval history  Resp: As per interval history  GI: Within normal limits   : Within normal limits  Musculoskeletal: pain/numbness lower extremities with ambulation  Skin: Within normal limits  Neurological: Within normal limits  Psychiatric: Within normal limits  Endocrine: hyperglycemia  Hematologic/Lymphatic: Within normal limits  Allergic/Immunologic: Within normal limits      MEDICATIONS:     Pulmonary "  ALBUTerol/ipratropium for Nebulization 3 milliLiter(s) Nebulizer <User Schedule>  buDESOnide   0.5 milliGRAM(s) Respule 0.5 milliGRAM(s) Inhalation every 12 hours  montelukast 10 milliGRAM(s) Oral daily  theophylline ER Capsule 400 milliGRAM(s) Oral daily      Anti-microbials:  nystatin    Suspension 580891 Unit(s) Oral four times a day      Cardiovascular:  isosorbide   mononitrate ER Tablet (IMDUR) 30 milliGRAM(s) Oral daily  olmesartan 20 milliGRAM(s) Oral daily      Other:  artificial tears (preservative free) Ophthalmic Solution 1 Drop(s) Both EYES three times a day  aspirin enteric coated 81 milliGRAM(s) Oral daily  cholecalciferol 2000 Unit(s) Oral daily  dextrose 40% Gel 15 Gram(s) Oral once PRN  dextrose 5%. 1000 milliLiter(s) IV Continuous <Continuous>  dextrose 50% Injectable 12.5 Gram(s) IV Push once  dextrose 50% Injectable 25 Gram(s) IV Push once  dextrose 50% Injectable 25 Gram(s) IV Push once  enoxaparin Injectable 40 milliGRAM(s) SubCutaneous every 24 hours  glucagon  Injectable 1 milliGRAM(s) IntraMuscular once PRN  insulin glargine Injectable (LANTUS) 17 Unit(s) SubCutaneous at bedtime  insulin lispro (HumaLOG) corrective regimen sliding scale   SubCutaneous three times a day before meals  insulin lispro (HumaLOG) corrective regimen sliding scale   SubCutaneous at bedtime  insulin lispro Injectable (HumaLOG) 10 Unit(s) SubCutaneous before dinner  insulin lispro Injectable (HumaLOG) 8 Unit(s) SubCutaneous before breakfast  insulin lispro Injectable (HumaLOG) 8 Unit(s) SubCutaneous before lunch  methylPREDNISolone sodium succinate Injectable 20 milliGRAM(s) IV Push every 6 hours  multivitamin 1 Tablet(s) Oral daily  rosuvastatin 10 milliGRAM(s) Oral at bedtime  sodium chloride 0.65% Nasal 1 Spray(s) Both Nostrils two times a day PRN        OBJECTIVE:    I&O's Detail    05 Dec 2018 07:01  -  06 Dec 2018 07:00  --------------------------------------------------------  IN:    Oral Fluid: 1680 mL  Total IN: 1680 mL    OUT:  Total OUT: 0 mL    Total NET: 1680 mL      06 Dec 2018 07:01  -  06 Dec 2018 18:05  --------------------------------------------------------  IN:    Oral Fluid: 240 mL  Total IN: 240 mL    OUT:  Total OUT: 0 mL    Total NET: 240 mL    POCT Blood Glucose.: 121 mg/dL (06 Dec 2018 17:51)  POCT Blood Glucose.: 62 mg/dL (06 Dec 2018 17:19)  POCT Blood Glucose.: 65 mg/dL (06 Dec 2018 17:16)  POCT Blood Glucose.: 215 mg/dL (06 Dec 2018 12:41)  POCT Blood Glucose.: 247 mg/dL (06 Dec 2018 08:28)  POCT Blood Glucose.: 360 mg/dL (05 Dec 2018 22:05)      PHYSICAL EXAM:       ICU Vital Signs Last 24 Hrs  T(C): 36.9 (06 Dec 2018 12:49), Max: 36.9 (06 Dec 2018 12:49)  T(F): 98.5 (06 Dec 2018 12:49), Max: 98.5 (06 Dec 2018 12:49)  HR: 89 (06 Dec 2018 12:49) (82 - 95)  BP: 132/79 (06 Dec 2018 12:49) (129/75 - 160/90)  BP(mean): --  ABP: --  ABP(mean): --  RR: 18 (06 Dec 2018 12:49) (18 - 18)  SpO2: 98% (06 Dec 2018 12:49) (97% - 100%) on 4lpm nasal canula at rest    General: Awake. Alert. Cooperative. No distress. Appears stated age. Obese 	  HEENT:  Atraumatic. Normocephalic. Anicteric. Normal oral mucosa. PERRL. EOMI.  Neck: Supple. Trachea midline. Thyroid without enlargement/tenderness/nodules. No carotid bruit. No JVD.	  Cardiovascular: Regular rate and rhythm. Distant S1 S2. No murmurs, rubs or gallops.  Respiratory: Respirations unlabored. Markedly decreased breath sounds throughout. Prolonged expiratory phase of respiration. No curvature.  Abdomen: Soft. Non-tender. Non-distended. No organomegaly. No masses. Normal bowel sounds. Obese.  Extremities: Warm to touch. No clubbing or cyanosis. Mild bilateral lower extremity edema up to the thigh  Pulses: Decreased lower extremity peripheral pulses  Skin: No rashes or lesions. No ecchymoses. No cyanosis. Warm to touch.  Lymph Nodes: Cervical, supraclavicular and axillary nodes normal  Neurological: Motor and sensory examination equal and normal. A and O x 3  Psychiatry: Appropriate mood and affect.     LABS:                        14.0   12.5  )-----------( 273      ( 06 Dec 2018 11:23 )             40.4     12-06    136  |  91<L>  |  33<H>  ----------------------------<  301<H>  5.2   |  36<H>  |  1.05    12-05    139  |  93<L>  |  31<H>  ----------------------------<  61<L>  4.9   |  36<H>  |  0.98    Ca      9.6      12-06    Ca      9.8      12-05      Venous Blood Gas:  11-29 @ 14:10  7.39/62/67/37/93  VBG Lactate: 1.7    < from: Transthoracic Echocardiogram (02.18.14 @ 08:32) >    Patient name: GUY HARTMAN  YOB: 1960   Age: 54 (F)   MR#: [id number]  Study Date: 2/18/2014  Location: [address] [place], [place] [id number]: Lashanda Geller [facility]  Study quality: Technically difficult  Referring Physician: John Gifford MD  Blood Pressure: 149/78 mmHg  Height: 5ft 6in  Weight: 195 lb  BSA: 2 m2  ------------------------------------------------------------------------  PROCEDURE: Transthoracic echocardiogram with 2-D, M-Mode  and complete spectral and color flow Doppler.  INDICATION: Dyspnea/SOB (786.09)  ------------------------------------------------------------------------  Dimensions:    Normal Values:  LA:     3.8    2.0 - 4.0 cm  Ao:     3.3    2.0 - 3.8 cm  SEPTUM: 0.9    0.6 - 1.2 cm  PWT:    0.9    0.6 - 1.1 cm  LVIDd:  4.9    3.0 - 5.6 cm  LVIDs:  3.2    1.8 - 4.0 cm  Derived variables:  LVMI: 77 g/m2  RWT: 0.36  Fractional short: 35 %  Ejection Fraction: 64 %  ------------------------------------------------------------------------  Observations:  Mitral Valve: Normal mitral valve. No significant mitral  valve regurgitation seen.  Aortic Valve/Aorta: Aortic valve not well visualized.  Normal aortic root.  Left Atrium: Normal left atrium.  LA volume index = 21  cc/m2.  Left Ventricle: Endocardium not well visualized; grossly  normal left ventricular systolic function. Normal left  ventricular internal dimensions and wall thicknesses.  Right Heart: Normal right atrium. Normal right ventricular  size and function. Tricuspid valve not well visualized,  probably normal. Trace tricuspid regurgitation. Pulmonic  valve not well visualized.  Hemodynamic: Estimated right atrial pressure is 10 mm Hg.  Inadequate tricuspid regurgitation Doppler envelope  precludes estimation of RVSP.  ------------------------------------------------------------------------  Conclusions:  1. Normal mitral valve. No significant mitral valve  regurgitation seen.  2. Aortic valve not well visualized.  3. Endocardium not well visualized; grossly normal left  ventricular systolic function.  4. Inadequate tricuspid regurgitation Doppler envelope  precludes estimation of RVSP.  5. Tricuspid valve not well visualized, probably normal.  Trace tricuspid regurgitation.  ------------------------------------------------------------------------  Confirmed on  2/18/2014 - 10:13:08 by David Schumacher M.D.  ------------------------------------------------------------------------    < end of copied text >  ---------------------------------------------------------------------------------------------------------------    MICROBIOLOGY:     Rapid Respiratory Viral Panel (11.30.18 @ 01:30)    Rapid RVP Result: [facility]: The FilmArray RVP Rapid uses polymerase chain reaction (PCR) and melt  curve analysis to screen for adenovirus; coronavirus HKU1, NL63, 229E,  OC43; human metapneumovirus (hMPV); human enterovirus/rhinovirus  (Entero/RV); influenza A; influenza A/H1;influenza A/H3; influenza  A/H1-2009; influenza B; parainfluenza viruses 1, 2, 3, 4; respiratory  syncytial virus; Bordetella pertussis; Mycoplasma pneumoniae; and  Chlamydophila pneumoniae.    RADIOLOGY:  [x] Chest radiographs reviewed and interpreted by me    < from: CT Angio Chest w/ IV Cont (12.04.18 @ 16:30) >    EXAM:  CT ANGIO CHEST (W)AW IC                          PROCEDURE DATE:  12/04/2018      INTERPRETATION:  CT CHEST WITH CONTRAST    INDICATION: Known COPD not responding to steroids and bronchodilators.   Progressively worsening dyspnea. Evaluate for pulmonary embolism.    TECHNIQUE: Enhanced helical images were obtained of the chest. Coronal   and sagittal images were reconstructed. Maximum intensity projection   images were generated. Images were obtained after the uneventful   administration of 60 cc nonionic intravenous Omnipaque 350.  40 cc of   Omnipaque 350 was discarded.    COMPARISON: CT chest 2/18/2014.    FINDINGS:     Lungs And Airways: Emphysematous changes of the lung.      The airways are unremarkable.      Pleura: No pneumothorax. No pleural effusion.    Mediastinum: There are no enlarged chest lymph nodes. The visualized   portion of the thyroid gland is unremarkable.       Heart and Vasculature: No pulmonary embolism.    The main pulmonary artery is normal in caliber. No thoracic aortic   aneurysm or dissection. Atheromatous disease of the aorta.    The heart is normal in size.  There is no pericardial effusion.   Coronary artery disease with calcified plaque involving the right and   left main coronary arteries and the left anterior descending artery.      Upper Abdomen: The upper abdomen is unremarkable.    Bones And Soft Tissues: Degenerative changes of the spine.  The soft   tissues are unremarkable.      IMPRESSION:   1.  No pulmonary embolism.  2. Emphysematous changes of the lung.    DIANA MEDINA M.D., RADIOLOGY RESIDENT  This document has been electronically signed.  JEYSON SANCHEZ M.D., ATTENDING RADIOLOGIST  This document has been electronically signed. Dec  4 2018  5:21PM      < end of copied text >  ---------------------------------------------------------------------------------------------------------------  < from: Xray Chest 1 View AP/PA (11.29.18 @ 14:53) >    EXAM:  XR CHEST AP OR PA 1V                          PROCEDURE DATE:  11/29/2018      INTERPRETATION:  A single chest x-ray was obtained on November 29, 2018.    Indication: Shortness of breath.    Impression:    The heart is normal in size. The lungs are clear. The visualized bones   are normal.    LAEX CUELLAR M.D., ATTENDING RADIOLOGIST  This document has been electronically signed. Nov 29 2018  2:59PM    < end of copied text >  ---------------------------------------------------------------------------------------------------------------  < from: VA Duplex Lower Ext Vein Scan, Bilat (12.06.18 @ 15:40) >    EXAM:  DUPLEX SCAN EXT VEINS LOWER BI                          PROCEDURE DATE:  12/06/2018      INTERPRETATION:  CLINICAL INFORMATION: Shortness of breath, leg pain and   swelling.    COMPARISON: Bilateral lower extremity venous duplex study dated 1/27/2009.    TECHNIQUE: Duplex sonography of the BILATERAL LOWER extremities with   color and spectral Doppler, with and without compression.      FINDINGS:    There is normal compressibility of the bilateral common femoral, femoral   and popliteal veins. No calf vein thrombosis is detected.    Doppler examination shows normal spontaneous and phasic flow.    IMPRESSION:     No evidence of bilateral lower extremity deep venous thrombosis.    JUSTIN ZAVALETA M.D., ATTENDING RADIOLOGIST  This document has been electronically signed. Dec  6 2018  4:03PM    < end of copied text >  ---------------------------------------------------------------------------------------------------------------      SPIROMETRY:     FEV1 0.56 liters - 22% predicted  FVC 1.73 liters - 52% predicted  FEV1% 32    c/w very severe obstructive lung disease

## 2018-12-07 LAB
ANION GAP SERPL CALC-SCNC: 8 MMOL/L — SIGNIFICANT CHANGE UP (ref 5–17)
BUN SERPL-MCNC: 33 MG/DL — HIGH (ref 7–23)
CALCIUM SERPL-MCNC: 9.9 MG/DL — SIGNIFICANT CHANGE UP (ref 8.4–10.5)
CHLORIDE SERPL-SCNC: 94 MMOL/L — LOW (ref 96–108)
CO2 SERPL-SCNC: 38 MMOL/L — HIGH (ref 22–31)
CREAT SERPL-MCNC: 1.08 MG/DL — SIGNIFICANT CHANGE UP (ref 0.5–1.3)
CULTURE RESULTS: SIGNIFICANT CHANGE UP
GLUCOSE BLDC GLUCOMTR-MCNC: 153 MG/DL — HIGH (ref 70–99)
GLUCOSE BLDC GLUCOMTR-MCNC: 191 MG/DL — HIGH (ref 70–99)
GLUCOSE BLDC GLUCOMTR-MCNC: 191 MG/DL — HIGH (ref 70–99)
GLUCOSE BLDC GLUCOMTR-MCNC: 269 MG/DL — HIGH (ref 70–99)
GLUCOSE SERPL-MCNC: 208 MG/DL — HIGH (ref 70–99)
GRAM STN FLD: SIGNIFICANT CHANGE UP
POTASSIUM SERPL-MCNC: 5.8 MMOL/L — HIGH (ref 3.5–5.3)
POTASSIUM SERPL-SCNC: 5.8 MMOL/L — HIGH (ref 3.5–5.3)
SODIUM SERPL-SCNC: 140 MMOL/L — SIGNIFICANT CHANGE UP (ref 135–145)
SPECIMEN SOURCE: SIGNIFICANT CHANGE UP
SPECIMEN SOURCE: SIGNIFICANT CHANGE UP

## 2018-12-07 PROCEDURE — 93306 TTE W/DOPPLER COMPLETE: CPT | Mod: 26

## 2018-12-07 PROCEDURE — 99233 SBSQ HOSP IP/OBS HIGH 50: CPT

## 2018-12-07 PROCEDURE — 99232 SBSQ HOSP IP/OBS MODERATE 35: CPT

## 2018-12-07 PROCEDURE — 99254 IP/OBS CNSLTJ NEW/EST MOD 60: CPT | Mod: GC

## 2018-12-07 RX ORDER — AMLODIPINE BESYLATE 2.5 MG/1
5 TABLET ORAL DAILY
Qty: 0 | Refills: 0 | Status: DISCONTINUED | OUTPATIENT
Start: 2018-12-08 | End: 2018-12-18

## 2018-12-07 RX ORDER — SENNA PLUS 8.6 MG/1
2 TABLET ORAL AT BEDTIME
Qty: 0 | Refills: 0 | Status: DISCONTINUED | OUTPATIENT
Start: 2018-12-07 | End: 2019-01-15

## 2018-12-07 RX ORDER — DOCUSATE SODIUM 100 MG
100 CAPSULE ORAL DAILY
Qty: 0 | Refills: 0 | Status: DISCONTINUED | OUTPATIENT
Start: 2018-12-07 | End: 2018-12-31

## 2018-12-07 RX ADMIN — Medication 20 MILLIGRAM(S): at 06:15

## 2018-12-07 RX ADMIN — Medication 0.5 MILLIGRAM(S): at 06:15

## 2018-12-07 RX ADMIN — Medication 81 MILLIGRAM(S): at 13:42

## 2018-12-07 RX ADMIN — ROSUVASTATIN CALCIUM 10 MILLIGRAM(S): 5 TABLET ORAL at 22:07

## 2018-12-07 RX ADMIN — Medication 20 MILLIGRAM(S): at 17:44

## 2018-12-07 RX ADMIN — Medication 10 UNIT(S): at 17:43

## 2018-12-07 RX ADMIN — MONTELUKAST 10 MILLIGRAM(S): 4 TABLET, CHEWABLE ORAL at 13:23

## 2018-12-07 RX ADMIN — OLMESARTAN MEDOXOMIL 20 MILLIGRAM(S): 5 TABLET, FILM COATED ORAL at 06:14

## 2018-12-07 RX ADMIN — Medication 1 DROP(S): at 21:57

## 2018-12-07 RX ADMIN — Medication 2000 UNIT(S): at 13:23

## 2018-12-07 RX ADMIN — ENOXAPARIN SODIUM 40 MILLIGRAM(S): 100 INJECTION SUBCUTANEOUS at 22:01

## 2018-12-07 RX ADMIN — Medication 8 UNIT(S): at 13:27

## 2018-12-07 RX ADMIN — Medication 3 MILLILITER(S): at 21:54

## 2018-12-07 RX ADMIN — Medication 100 MILLIGRAM(S): at 21:55

## 2018-12-07 RX ADMIN — Medication 1 TABLET(S): at 13:24

## 2018-12-07 RX ADMIN — Medication 3 MILLILITER(S): at 18:14

## 2018-12-07 RX ADMIN — Medication 500 MILLIGRAM(S): at 06:15

## 2018-12-07 RX ADMIN — Medication 20 MILLIGRAM(S): at 23:32

## 2018-12-07 RX ADMIN — Medication 0.5 MILLIGRAM(S): at 17:43

## 2018-12-07 RX ADMIN — Medication 500000 UNIT(S): at 13:25

## 2018-12-07 RX ADMIN — Medication 2: at 10:36

## 2018-12-07 RX ADMIN — Medication 8 UNIT(S): at 10:38

## 2018-12-07 RX ADMIN — Medication 500000 UNIT(S): at 06:14

## 2018-12-07 RX ADMIN — Medication 500000 UNIT(S): at 23:32

## 2018-12-07 RX ADMIN — Medication 20 MILLIGRAM(S): at 13:26

## 2018-12-07 RX ADMIN — Medication 400 MILLIGRAM(S): at 13:24

## 2018-12-07 RX ADMIN — Medication 2: at 17:44

## 2018-12-07 RX ADMIN — Medication 3 MILLILITER(S): at 06:15

## 2018-12-07 RX ADMIN — INSULIN GLARGINE 17 UNIT(S): 100 INJECTION, SOLUTION SUBCUTANEOUS at 21:55

## 2018-12-07 RX ADMIN — ISOSORBIDE MONONITRATE 30 MILLIGRAM(S): 60 TABLET, EXTENDED RELEASE ORAL at 13:23

## 2018-12-07 RX ADMIN — Medication 500000 UNIT(S): at 17:45

## 2018-12-07 RX ADMIN — Medication 500 MILLIGRAM(S): at 18:14

## 2018-12-07 RX ADMIN — Medication 1 DROP(S): at 13:28

## 2018-12-07 RX ADMIN — Medication 3 MILLILITER(S): at 13:28

## 2018-12-07 RX ADMIN — Medication 1 DROP(S): at 06:14

## 2018-12-07 RX ADMIN — SENNA PLUS 2 TABLET(S): 8.6 TABLET ORAL at 21:55

## 2018-12-07 RX ADMIN — Medication 3 MILLILITER(S): at 10:28

## 2018-12-07 RX ADMIN — Medication 2: at 13:27

## 2018-12-07 RX ADMIN — Medication 2: at 21:58

## 2018-12-07 NOTE — CONSULT NOTE ADULT - ATTENDING COMMENTS
Patient seen and examined  She has no stigmata of venous insufficiency or varicose veins, no hyperpigmentation  Echo likely not contributing to edema    Agree with compression stockings.   Hopefully once she is more mobile and on reduced steroids, edema will improve.    No further vascular testing or intervention at this point    Felipe [id number]

## 2018-12-07 NOTE — PROGRESS NOTE ADULT - PROBLEM SELECTOR PLAN 1
-Test BG ac and hs  -c/w Lantus dose 17 qhs plus Humalog 8-8-10. HOLD IF NOT EATING!!!  -Continue Humalog moderate correction scale ac and hs while on steroids  -Please contact endo team with any changes on steroid therapy! Per NP no further steroid changes discussed today  -Plan discussed with pt/team.  Contact info: [phone] (24/7). pager [phone]

## 2018-12-07 NOTE — CONSULT NOTE ADULT - ASSESSMENT
58yoF w/ advanced COPD on home O2 3L (being evaluated at [facility] for lung transplant), HTN, HLD, CAD s/p 6 stents, DMII on metformin, PVD, who p/w progressive worsening dyspnea x 3 weeks c/w COPD exacerbation. Renal now called for persistent hyperkalemia.

## 2018-12-07 NOTE — PROGRESS NOTE ADULT - SUBJECTIVE AND OBJECTIVE BOX
DIABETES FOLLOW UP NOTE: Saw pt earlier today  INTERVAL HX: 57 y/o F w/h/o controlled T2DM on Metformin 500mg bid. Also h/o CAD and COPD. Here with COPD exacerbation on Methylprednisolone now on steroid taper 30mg q8h. Glycemic control variable depending on PO intake and per staff pt eats at different times of the day. Hypoglycemic ac dinner again. Pt can't recall if she ate lunch of what she ate but can remember eating most of dinner. Pt states no symptoms while having hypoglycemia and she believes she probably didn't eat enough lunch because dislikes hospital food. Skips carbs on and off. Creat levels improved. BG today in high 100s and also fasting hyperglycemia per BMP. Didn't until after 10am this morning.       Review of Systems:  General: States breathing still the same. Reports not getting better> Pt states she needs a lung transplant  Cardiovascular: No chest pain, palpitations  Respiratory: On and off SOB, no cough  GI: No nausea, vomiting, abdominal pain  Endocrine: no polyuria, polydipsia or S&Sx of hypoglycemia    Allergies    No Known Allergies    Intolerances      MEDICATIONS:  cholecalciferol 2000 Unit(s) Oral daily  insulin glargine Injectable (LANTUS) 17 Unit(s) SubCutaneous at bedtime  insulin lispro (HumaLOG) corrective regimen sliding scale   SubCutaneous three times a day before meals  insulin lispro (HumaLOG) corrective regimen sliding scale   SubCutaneous at bedtime  insulin lispro Injectable (HumaLOG) 10 Unit(s) SubCutaneous before dinner  insulin lispro Injectable (HumaLOG) 8 Unit(s) SubCutaneous before breakfast  insulin lispro Injectable (HumaLOG) 8 Unit(s) SubCutaneous before lunch  methylPREDNISolone sodium succinate Injectable 20 milliGRAM(s) IV Push every 6 hours  rosuvastatin 10 milliGRAM(s) Oral at bedtime  theophylline ER Capsule 400 milliGRAM(s) Oral daily  clarithromycin 500 milliGRAM(s) Oral two times a day    PHYSICAL EXAM:  Vital Signs Last 24 Hrs  T(C): 36.7 (12-07-18 @ 13:34), Max: 36.7 (12-07-18 @ 13:34)  T(F): 98.1 (12-07-18 @ 13:34), Max: 98.1 (12-07-18 @ 13:34)  HR: 92 (12-07-18 @ 13:34) (84 - 94)  BP: 137/74 (12-07-18 @ 13:34) (137/74 - 147/89)  BP(mean): --  RR: 18 (12-07-18 @ 13:34) (18 - 18)  SpO2: 98% (12-07-18 @ 13:34) (96% - 98%)  GENERAL: Female laying in bed in NAD  Abdomen: Soft, nontender, non distended, central adiposity  Extremities: Warm, no edema in all 4 exts  NEURO: A&O X3    LABS:  POCT Blood Glucose.: 191 mg/dL (12-07-18 @ 12:46)  POCT Blood Glucose.: 191 mg/dL (12-07-18 @ 10:25)  POCT Blood Glucose.: 107 mg/dL (12-06-18 @ 21:07)  POCT Blood Glucose.: 121 mg/dL (12-06-18 @ 17:51)  POCT Blood Glucose.: 62 mg/dL (12-06-18 @ 17:19)  POCT Blood Glucose.: 65 mg/dL (12-06-18 @ 17:16)  POCT Blood Glucose.: 215 mg/dL (12-06-18 @ 12:41)  POCT Blood Glucose.: 247 mg/dL (12-06-18 @ 08:28)  POCT Blood Glucose.: 360 mg/dL (12-05-18 @ 22:05)  POCT Blood Glucose.: 71 mg/dL (12-05-18 @ 17:09)  POCT Blood Glucose.: 68 mg/dL (12-05-18 @ 17:08)                           14.0   12.5  )-----------( 273      ( 06 Dec 2018 11:23 )             40.4     12-07    140  |  94<L>  |  33<H>  ----------------------------<  208<H>  5.8<H>   |  38<H>  |  1.08    Ca    9.9      07 Dec 2018 07:12    eGFR if Non African American: 56 mL/min/1.73M2 (12-07-18 @ 07:12)  eGFR if Non African American: 58 mL/min/1.73M2 (12-06-18 @ 11:20)  eGFR if Non African American: 63 mL/min/1.73M2 (12-05-18 @ 18:09)    Hemoglobin A1C, Whole Blood: 7.2 % <H> [4.0 - 5.6] (11-30-18 @ 07:58)

## 2018-12-07 NOTE — PROGRESS NOTE ADULT - SUBJECTIVE AND OBJECTIVE BOX
[facility] - [facility]     PROGRESS NOTE    CHIEF COMPLAINT: chronic hypoxic/hypercapnic respiratory failure; COPD exacerbation; emphysema; dyspnea    INTERVAL HISTORY: no shortness of breath at rest or with talking on 4lpm nasal canula; still becoming quite short of breath with minimal exertion; mild cough with less sputum production; no chest congestion or wheeze; no fevers, chills or sweats; no chest pain/pressure or palpitations; hoarseness improved; hyperglycemicl; CTA without PE; increasing leg swelling despite large thigh high TOMAS stockings.    REVIEW OF SYSTEMS:  Constitutional: As per interval history  HEENT: hoarse  CV: As per interval history  Resp: As per interval history  GI: Within normal limits   : Within normal limits  Musculoskeletal: pain/numbness lower extremities with ambulation  Skin: Within normal limits  Neurological: Within normal limits  Psychiatric: Within normal limits  Endocrine: hyperglycemia  Hematologic/Lymphatic: Within normal limits  Allergic/Immunologic: Within normal limits      MEDICATIONS:     Pulmonary "  ALBUTerol/ipratropium for Nebulization 3 milliLiter(s) Nebulizer <User Schedule>  buDESOnide   0.5 milliGRAM(s) Respule 0.5 milliGRAM(s) Inhalation every 12 hours  montelukast 10 milliGRAM(s) Oral daily  theophylline ER Capsule 400 milliGRAM(s) Oral daily      Anti-microbials:  clarithromycin 500 milliGRAM(s) Oral two times a day  nystatin    Suspension 161009 Unit(s) Oral four times a day      Cardiovascular:  isosorbide   mononitrate ER Tablet (IMDUR) 30 milliGRAM(s) Oral daily  olmesartan 20 milliGRAM(s) Oral daily      Other:  artificial tears (preservative free) Ophthalmic Solution 1 Drop(s) Both EYES three times a day  aspirin enteric coated 81 milliGRAM(s) Oral daily  cholecalciferol 2000 Unit(s) Oral daily  dextrose 40% Gel 15 Gram(s) Oral once PRN  dextrose 5%. 1000 milliLiter(s) IV Continuous <Continuous>  dextrose 50% Injectable 12.5 Gram(s) IV Push once  dextrose 50% Injectable 25 Gram(s) IV Push once  dextrose 50% Injectable 25 Gram(s) IV Push once  enoxaparin Injectable 40 milliGRAM(s) SubCutaneous every 24 hours  glucagon  Injectable 1 milliGRAM(s) IntraMuscular once PRN  insulin glargine Injectable (LANTUS) 17 Unit(s) SubCutaneous at bedtime  insulin lispro (HumaLOG) corrective regimen sliding scale   SubCutaneous three times a day before meals  insulin lispro (HumaLOG) corrective regimen sliding scale   SubCutaneous at bedtime  insulin lispro Injectable (HumaLOG) 10 Unit(s) SubCutaneous before dinner  insulin lispro Injectable (HumaLOG) 8 Unit(s) SubCutaneous before breakfast  insulin lispro Injectable (HumaLOG) 8 Unit(s) SubCutaneous before lunch  methylPREDNISolone sodium succinate Injectable 20 milliGRAM(s) IV Push every 6 hours  multivitamin 1 Tablet(s) Oral daily  rosuvastatin 10 milliGRAM(s) Oral at bedtime  sodium chloride 0.65% Nasal 1 Spray(s) Both Nostrils two times a day PRN        OBJECTIVE:    I&O's Detail    06 Dec 2018 07:01  -  07 Dec 2018 07:00  --------------------------------------------------------  IN:    Oral Fluid: 1320 mL  Total IN: 1320 mL    OUT:  Total OUT: 0 mL    Total NET: 1320 mL    POCT Blood Glucose.: 191 mg/dL (07 Dec 2018 10:25)  POCT Blood Glucose.: 107 mg/dL (06 Dec 2018 21:07)  POCT Blood Glucose.: 121 mg/dL (06 Dec 2018 17:51)  POCT Blood Glucose.: 62 mg/dL (06 Dec 2018 17:19)  POCT Blood Glucose.: 65 mg/dL (06 Dec 2018 17:16)  POCT Blood Glucose.: 215 mg/dL (06 Dec 2018 12:41)      PHYSICAL EXAM:       ICU Vital Signs Last 24 Hrs  T(C): 36.3 (07 Dec 2018 06:13), Max: 36.9 (06 Dec 2018 12:49)  T(F): 97.4 (07 Dec 2018 06:13), Max: 98.5 (06 Dec 2018 12:49)  HR: 84 (07 Dec 2018 06:13) (84 - 94)  BP: 147/89 (07 Dec 2018 06:13) (132/79 - 147/89)  BP(mean): --  ABP: --  ABP(mean): --  RR: 18 (07 Dec 2018 06:13) (18 - 18)  SpO2: 96% (07 Dec 2018 06:13) (96% - 98%) on 4lpm nasal canula    General: Awake. Alert. Cooperative. No distress. Appears stated age. Obese 	  HEENT:  Atraumatic. Normocephalic. Anicteric. Normal oral mucosa. PERRL. EOMI.  Neck: Supple. Trachea midline. Thyroid without enlargement/tenderness/nodules. No carotid bruit. No JVD.	  Cardiovascular: Regular rate and rhythm. Distant S1 S2. No murmurs, rubs or gallops.  Respiratory: Respirations unlabored. Markedly decreased breath sounds throughout. Prolonged expiratory phase of respiration. No wheeze. No curvature.  Abdomen: Soft. Non-tender. Non-distended. No organomegaly. No masses. Normal bowel sounds. Obese.  Extremities: Warm to touch. No clubbing or cyanosis. Mild bilateral lower extremity edema up to the thigh with TOMAS stockings in place  Pulses: Decreased lower extremity peripheral pulses.  Skin: No rashes or lesions. No ecchymoses. No cyanosis. Warm to touch.  Lymph Nodes: Cervical, supraclavicular and axillary nodes normal  Neurological: Motor and sensory examination equal and normal. A and O x 3  Psychiatry: Appropriate mood and affect.        LABS:                        14.0   12.5  )-----------( 273      ( 06 Dec 2018 11:23 )             40.4     12-07    140  |  94<L>  |  33<H>  ----------------------------<  208<H>  5.8<H>   |  38<H>  |  1.08    12-06    136  |  91<L>  |  33<H>  ----------------------------<  301<H>  5.2   |  36<H>  |  1.05    Ca      9.9      12-07    Ca      9.6      12-06    Venous Blood Gas:  11-29 @ 14:10  7.39/62/67/37/93  VBG Lactate: 1.7      MICROBIOLOGY:     Rapid Respiratory Viral Panel (11.30.18 @ 01:30)    Rapid RVP Result: NotDete: The FilmArray RVP Rapid uses polymerase chain reaction (PCR) and melt  curve analysis to screen for adenovirus; coronavirus HKU1, NL63, 229E,  OC43; human metapneumovirus (hMPV); human enterovirus/rhinovirus  (Entero/RV); influenza A; influenza A/H1;influenza A/H3; influenza  A/H1-2009; influenza B; parainfluenza viruses 1, 2, 3, 4; respiratory  syncytial virus; Bordetella pertussis; Mycoplasma pneumoniae; and  Chlamydophila pneumoniae.    Culture - Sputum . (12.05.18 @ 22:50)    Gram Stain:   Moderate polymorphonuclear leukocytes per low power field  Few Squamous epithelial cells per low power field  Moderate Gram Positive Cocci in Pairs and Chains per oil power field    Specimen Source: .Sputum Sputum    Culture Results:   Normal Respiratory Samaria present    RADIOLOGY:  [x] Chest radiographs reviewed and interpreted by me    < from: CT Angio Chest w/ IV Cont (12.04.18 @ 16:30) >    EXAM:  CT ANGIO CHEST (W)AW IC                          PROCEDURE DATE:  12/04/2018      INTERPRETATION:  CT CHEST WITH CONTRAST    INDICATION: Known COPD not responding to steroids and bronchodilators.   Progressively worsening dyspnea. Evaluate for pulmonary embolism.    TECHNIQUE: Enhanced helical images were obtained of the chest. Coronal   and sagittal images were reconstructed. Maximum intensity projection   images were generated. Images were obtained after the uneventful   administration of 60 cc nonionic intravenous Omnipaque 350.  40 cc of   Omnipaque 350 was discarded.    COMPARISON: CT chest 2/18/2014.    FINDINGS:     Lungs And Airways: Emphysematous changes of the lung.      The airways are unremarkable.      Pleura: No pneumothorax. No pleural effusion.    Mediastinum: There are no enlarged chest lymph nodes. The visualized   portion of the thyroid gland is unremarkable.       Heart and Vasculature: No pulmonary embolism.    The main pulmonary artery is normal in caliber. No thoracic aortic   aneurysm or dissection. Atheromatous disease of the aorta.    The heart is normal in size.  There is no pericardial effusion.   Coronary artery disease with calcified plaque involving the right and   left main coronary arteries and the left anterior descending artery.      Upper Abdomen: The upper abdomen is unremarkable.    Bones And Soft Tissues: Degenerative changes of the spine.  The soft   tissues are unremarkable.      IMPRESSION:   1.  No pulmonary embolism.  2. Emphysematous changes of the lung.    DIANA MEDINA M.D., RADIOLOGY RESIDENT  This document has been electronically signed.  JEYSON SANCHEZ M.D., ATTENDING RADIOLOGIST  This document has been electronically signed. Dec  4 2018  5:21PM      < end of copied text >  ---------------------------------------------------------------------------------------------------------------  < from: Xray Chest 1 View AP/PA (11.29.18 @ 14:53) >    EXAM:  XR CHEST AP OR PA 1V                          PROCEDURE DATE:  11/29/2018      INTERPRETATION:  A single chest x-ray was obtained on November 29, 2018.    Indication: Shortness of breath.    Impression:    The heart is normal in size. The lungs are clear. The visualized bones   are normal.    ALEX CUELLAR M.D., ATTENDING RADIOLOGIST  This document has been electronically signed. Nov 29 2018  2:59PM    < end of copied text >  ---------------------------------------------------------------------------------------------------------------  < from: [facility] Duplex Lower Ext Vein Scan, Bilat (12.06.18 @ 15:40) >    EXAM:  DUPLEX SCAN EXT VEINS LOWER BI                          PROCEDURE DATE:  12/06/2018      INTERPRETATION:  CLINICAL INFORMATION: Shortness of breath, leg pain and   swelling.    COMPARISON: Bilateral lower extremity venous duplex study dated 1/27/2009.    TECHNIQUE: Duplex sonography of the BILATERAL LOWER extremities with   color and spectral Doppler, with and without compression.      FINDINGS:    There is normal compressibility of the bilateral common femoral, femoral   and popliteal veins. No calf vein thrombosis is detected.    Doppler examination shows normal spontaneous and phasic flow.    IMPRESSION:     No evidence of bilateral lower extremity deep venous thrombosis.    JUSTIN ZAVALETA M.D., ATTENDING RADIOLOGIST  This document has been electronically signed. Dec  6 2018  4:03PM    < end of copied text >  ---------------------------------------------------------------------------------------------------------------      SPIROMETRY:     FEV1 0.56 liters - 22% predicted  FVC 1.73 liters - 52% predicted  FEV1% 32    c/w very severe obstructive lung disease [facility] - [facility]     PROGRESS NOTE    CHIEF COMPLAINT: chronic hypoxic/hypercapnic respiratory failure; COPD exacerbation; emphysema; dyspnea    INTERVAL HISTORY: no shortness of breath at rest or with talking on 4lpm nasal canula; still becoming quite short of breath with minimal exertion; mild cough with less sputum production; no chest congestion or wheeze; no fevers, chills or sweats; no chest pain/pressure or palpitations; hoarseness improved; hyperglycemicl; CTA without PE; increasing leg swelling despite large thigh high TOMAS stockings.    REVIEW OF SYSTEMS:  Constitutional: As per interval history  HEENT: hoarse  CV: As per interval history  Resp: As per interval history  GI: Within normal limits   : Within normal limits  Musculoskeletal: pain/numbness lower extremities with ambulation  Skin: Within normal limits  Neurological: Within normal limits  Psychiatric: Within normal limits  Endocrine: hyperglycemia  Hematologic/Lymphatic: Within normal limits  Allergic/Immunologic: Within normal limits      MEDICATIONS:     Pulmonary "  ALBUTerol/ipratropium for Nebulization 3 milliLiter(s) Nebulizer <User Schedule>  buDESOnide   0.5 milliGRAM(s) Respule 0.5 milliGRAM(s) Inhalation every 12 hours  montelukast 10 milliGRAM(s) Oral daily  theophylline ER Capsule 400 milliGRAM(s) Oral daily      Anti-microbials:  clarithromycin 500 milliGRAM(s) Oral two times a day  nystatin    Suspension [id number] Unit(s) Oral four times a day      Cardiovascular:  isosorbide   mononitrate ER Tablet (IMDUR) 30 milliGRAM(s) Oral daily  olmesartan 20 milliGRAM(s) Oral daily      Other:  artificial tears (preservative free) Ophthalmic Solution 1 Drop(s) Both EYES three times a day  aspirin enteric coated 81 milliGRAM(s) Oral daily  cholecalciferol 2000 Unit(s) Oral daily  dextrose 40% Gel 15 Gram(s) Oral once PRN  dextrose 5%. 1000 milliLiter(s) IV Continuous <Continuous>  dextrose 50% Injectable 12.5 Gram(s) IV Push once  dextrose 50% Injectable 25 Gram(s) IV Push once  dextrose 50% Injectable 25 Gram(s) IV Push once  enoxaparin Injectable 40 milliGRAM(s) SubCutaneous every 24 hours  glucagon  Injectable 1 milliGRAM(s) IntraMuscular once PRN  insulin glargine Injectable (LANTUS) 17 Unit(s) SubCutaneous at bedtime  insulin lispro (HumaLOG) corrective regimen sliding scale   SubCutaneous three times a day before meals  insulin lispro (HumaLOG) corrective regimen sliding scale   SubCutaneous at bedtime  insulin lispro Injectable (HumaLOG) 10 Unit(s) SubCutaneous before dinner  insulin lispro Injectable (HumaLOG) 8 Unit(s) SubCutaneous before breakfast  insulin lispro Injectable (HumaLOG) 8 Unit(s) SubCutaneous before lunch  methylPREDNISolone sodium succinate Injectable 20 milliGRAM(s) IV Push every 6 hours  multivitamin 1 Tablet(s) Oral daily  rosuvastatin 10 milliGRAM(s) Oral at bedtime  sodium chloride 0.65% Nasal 1 Spray(s) Both Nostrils two times a day PRN        OBJECTIVE:    I&O's Detail    06 Dec 2018 07:01  -  07 Dec 2018 07:00  --------------------------------------------------------  IN:    Oral Fluid: 1320 mL  Total IN: 1320 mL    OUT:  Total OUT: 0 mL    Total NET: 1320 mL    POCT Blood Glucose.: 191 mg/dL (07 Dec 2018 10:25)  POCT Blood Glucose.: 107 mg/dL (06 Dec 2018 21:07)  POCT Blood Glucose.: 121 mg/dL (06 Dec 2018 17:51)  POCT Blood Glucose.: 62 mg/dL (06 Dec 2018 17:19)  POCT Blood Glucose.: 65 mg/dL (06 Dec 2018 17:16)  POCT Blood Glucose.: 215 mg/dL (06 Dec 2018 12:41)      PHYSICAL EXAM:       ICU Vital Signs Last 24 Hrs  T(C): 36.3 (07 Dec 2018 06:13), Max: 36.9 (06 Dec 2018 12:49)  T(F): 97.4 (07 Dec 2018 06:13), Max: 98.5 (06 Dec 2018 12:49)  HR: 84 (07 Dec 2018 06:13) (84 - 94)  BP: 147/89 (07 Dec 2018 06:13) (132/79 - 147/89)  BP(mean): --  ABP: --  ABP(mean): --  RR: 18 (07 Dec 2018 06:13) (18 - 18)  SpO2: 96% (07 Dec 2018 06:13) (96% - 98%) on 4lpm nasal canula    General: Awake. Alert. Cooperative. No distress. Appears stated age. Obese 	  HEENT:  Atraumatic. Normocephalic. Anicteric. Normal oral mucosa. PERRL. EOMI.  Neck: Supple. Trachea midline. Thyroid without enlargement/tenderness/nodules. No carotid bruit. No JVD.	  Cardiovascular: Regular rate and rhythm. Distant S1 S2. No murmurs, rubs or gallops.  Respiratory: Respirations unlabored. Markedly decreased breath sounds throughout. Prolonged expiratory phase of respiration. No wheeze. No curvature.  Abdomen: Soft. Non-tender. Non-distended. No organomegaly. No masses. Normal bowel sounds. Obese.  Extremities: Warm to touch. No clubbing or cyanosis. Mild bilateral lower extremity edema up to the thigh with TOMAS stockings in place  Pulses: Decreased lower extremity peripheral pulses.  Skin: No rashes or lesions. No ecchymoses. No cyanosis. Warm to touch.  Lymph Nodes: Cervical, supraclavicular and axillary nodes normal  Neurological: Motor and sensory examination equal and normal. A and O x 3  Psychiatry: Appropriate mood and affect.        LABS:                        14.0   12.5  )-----------( 273      ( 06 Dec 2018 11:23 )             40.4     12-07    140  |  94<L>  |  33<H>  ----------------------------<  208<H>  5.8<H>   |  38<H>  |  1.08    12-06    136  |  91<L>  |  33<H>  ----------------------------<  301<H>  5.2   |  36<H>  |  1.05    Ca      9.9      12-07    Ca      9.6      12-06    Venous Blood Gas:  11-29 @ 14:10  7.39/62/67/37/93  VBG Lactate: 1.7    < from: Transthoracic Echocardiogram (12.07.18 @ 08:59) >    Patient name: GUY HARTMAN  YOB: 1960   Age: 58 (F)   MR#: [id number]  Study Date: 12/7/2018  Location: [address] [place], [place] [id number]: La[facility] Armando [facility]  Study quality: Technically good  Referring Physician: Allen ingram MD  BloodPressure: 120/80 mmHg  Height: 163 cm  Weight: 88 kg  BSA: 1.9 m2  ------------------------------------------------------------------------  PROCEDURE: Transthoracic echocardiogram with 2-D, M-Mode  and complete spectral and color flow Doppler.  INDICATION: Other forms of dyspnea (R06.09)  ------------------------------------------------------------------------  Dimensions:    Normal Values:  LA:     3.6    2.0 - 4.0 cm  Ao:     2.9    2.0 - 3.8 cm  SEPTUM: 0.9    0.6 - 1.2 cm  PWT:    0.8    0.6- 1.1 cm  LVIDd:  4.9    3.0 - 5.6 cm  LVIDs:  2.9    1.8 - 4.0 cm  Derived variables:  LVMI: 73 g/m2  RWT: 0.32  Fractional short: 40 %  EF (Teicholtz): 71 %  Doppler Peak Velocity (m/sec): AoV=1.2  ------------------------------------------------------------------------  Observations:  Mitral Valve: Normal mitral valve.  Aortic Valve/Aorta: Normal trileaflet aortic valve. Peak  transaortic valve gradient equals 6 mm Hg, mean transaortic  valve gradient equals 3 mm Hg, aortic valve velocity time  integral equals 22 cm. Trace aortic regurgitation.  Aortic Root: 2.9 cm.  Left Atrium: Normal left atrium.  LA volume index = 18  cc/m2.  Left Ventricle: Normal left ventricular systolic function.  No segmental wall motion abnormalities. Normal left  ventricular internal dimensions and wall thicknesses. Mild  diastolic dysfunction (Stage I).  Right Heart: Normal right atrium. Normal right ventricular  size and function. Normal tricuspid valve. Minimal  tricuspid regurgitation. Normal pulmonic valve.  Pericardium/Pleura: Normal pericardium with no pericardial  effusion.  Hemodynamic: Estimated right atrial pressure is 8 mm Hg.  Inadequate tricuspid regurgitation Doppler envelope  precludes estimation of RVSP.  ------------------------------------------------------------------------  Conclusions:  1. Normal mitral valve.  2. Normal trileaflet aortic valve. Peak transaortic valve  gradient equals 6 mm Hg, mean transaortic valve gradient  equals 3 mm Hg, aortic valve velocity time integral equals  22 cm. Trace aortic regurgitation.  3. Aortic Root: 2.9 cm.  4. Normal left atrium.  LA volume index = 18 cc/m2.  5. Normal left ventricular internal dimensions and wall  thicknesses.  6. Normal left ventricular systolic function. No segmental  wall motion abnormalities.  7. Mild diastolic dysfunction (Stage I).  8. Normal right ventricular size and function.  9. Inadequate tricuspid regurgitation Doppler envelope  precludes estimation of RVSP.  10. Normal tricuspid valve. Minimal tricuspid  regurgitation.  *** Compared with echocardiogram report of 2/18/2014, no  significant changes noted.  ------------------------------------------------------------------------  Confirmed on  12/7/2018 - 13:49:43 by Shefali Gómez M.D.  ------------------------------------------------------------------------    < end of copied text >  ---------------------------------------------------------------------------------------------------------------     MICROBIOLOGY:     Rapid Respiratory Viral Panel (11.30.18 @ 01:30)    Rapid RVP Result: NotDete: The FilmArray RVP Rapid uses polymerase chain reaction (PCR) and melt  curve analysis to screen for adenovirus; coronavirus HKU1, NL63, 229E,  OC43; human metapneumovirus (hMPV); human enterovirus/rhinovirus  (Entero/RV); influenza A; influenza A/H1;influenza A/H3; influenza  A/H1-2009; influenza B; parainfluenza viruses 1, 2, 3, 4; respiratory  syncytial virus; Bordetella pertussis; Mycoplasma pneumoniae; and  Chlamydophila pneumoniae.    Culture - Sputum . (12.05.18 @ 22:50)    Gram Stain:   Moderate polymorphonuclear leukocytes per low power field  Few Squamous epithelial cells per low power field  Moderate Gram Positive Cocci in Pairs and Chains per oil power field    Specimen Source: .Sputum Sputum    Culture Results:   Normal Respiratory Samaria present    RADIOLOGY:  [x] Chest radiographs reviewed and interpreted by me    < from: CT Angio Chest w/ IV Cont (12.04.18 @ 16:30) >    EXAM:  CT ANGIO CHEST (W)AW IC                          PROCEDURE DATE:  12/04/2018      INTERPRETATION:  CT CHEST WITH CONTRAST    INDICATION: Known COPD not responding to steroids and bronchodilators.   Progressively worsening dyspnea. Evaluate for pulmonary embolism.    TECHNIQUE: Enhanced helical images were obtained of the chest. Coronal   and sagittal images were reconstructed. Maximum intensity projection   images were generated. Images were obtained after the uneventful   administration of 60 cc nonionic intravenous Omnipaque 350.  40 cc of   Omnipaque 350 was discarded.    COMPARISON: CT chest 2/18/2014.    FINDINGS:     Lungs And Airways: Emphysematous changes of the lung.      The airways are unremarkable.      Pleura: No pneumothorax. No pleural effusion.    Mediastinum: There are no enlarged chest lymph nodes. The visualized   portion of the thyroid gland is unremarkable.       Heart and Vasculature: No pulmonary embolism.    The main pulmonary artery is normal in caliber. No thoracic aortic   aneurysm or dissection. Atheromatous disease of the aorta.    The heart is normal in size.  There is no pericardial effusion.   Coronary artery disease with calcified plaque involving the right and   left main coronary arteries and the left anterior descending artery.      Upper Abdomen: The upper abdomen is unremarkable.    Bones And Soft Tissues: Degenerative changes of the spine.  The soft   tissues are unremarkable.      IMPRESSION:   1.  No pulmonary embolism.  2. Emphysematous changes of the lung.    DIANA MEDINA M.D., RADIOLOGY RESIDENT  This document has been electronically signed.  JEYSON SNACHEZ M.D., ATTENDING RADIOLOGIST  This document has been electronically signed. Dec  4 2018  5:21PM      < end of copied text >  ---------------------------------------------------------------------------------------------------------------  < from: Xray Chest 1 View AP/PA (11.29.18 @ 14:53) >    EXAM:  XR CHEST AP OR PA 1V                          PROCEDURE DATE:  11/29/2018      INTERPRETATION:  A single chest x-ray was obtained on November 29, 2018.    Indication: Shortness of breath.    Impression:    The heart is normal in size. The lungs are clear. The visualized bones   are normal.    ALEX CUELLAR M.D., ATTENDING RADIOLOGIST  This document has been electronically signed. Nov 29 2018  2:59PM    < end of copied text >  ---------------------------------------------------------------------------------------------------------------  < from: [facility] Duplex Lower Ext Vein Scan, Darius (12.06.18 @ 15:40) >    EXAM:  DUPLEX SCAN EXT VEINS LOWER BI                          PROCEDURE DATE:  12/06/2018      INTERPRETATION:  CLINICAL INFORMATION: Shortness of breath, leg pain and   swelling.    COMPARISON: Bilateral lower extremity venous duplex study dated 1/27/2009.    TECHNIQUE: Duplex sonography of the BILATERAL LOWER extremities with   color and spectral Doppler, with and without compression.      FINDINGS:    There is normal compressibility of the bilateral common femoral, femoral   and popliteal veins. No calf vein thrombosis is detected.    Doppler examination shows normal spontaneous and phasic flow.    IMPRESSION:     No evidence of bilateral lower extremity deep venous thrombosis.    JUSTIN ZAVALETA M.D., ATTENDING RADIOLOGIST  This document has been electronically signed. Dec  6 2018  4:03PM    < end of copied text >  ---------------------------------------------------------------------------------------------------------------      SPIROMETRY:     FEV1 0.56 liters - 22% predicted  FVC 1.73 liters - 52% predicted  FEV1% 32    c/w very severe obstructive lung disease

## 2018-12-07 NOTE — PROGRESS NOTE ADULT - SUBJECTIVE AND OBJECTIVE BOX
SUBJECTIVE:  Seen with NP, Nafisa Hernandez.  No longer having a sore throat.  Still with leg edema.  Able to speak in full sentences on oxygen NC, but reports feeling short of breath with speaking too long and feels that breathing is not back to baseline.  On questioning, reports some constipation, but declines laxative, says she would like to try some prune juice.  NAD.    MEDICATIONS  (STANDING):  ALBUTerol/ipratropium for Nebulization 3 milliLiter(s) Nebulizer <User Schedule>  artificial tears (preservative free) Ophthalmic Solution 1 Drop(s) Both EYES three times a day  aspirin enteric coated 81 milliGRAM(s) Oral daily  buDESOnide   0.5 milliGRAM(s) Respule 0.5 milliGRAM(s) Inhalation every 12 hours  cholecalciferol 2000 Unit(s) Oral daily  clarithromycin 500 milliGRAM(s) Oral two times a day  dextrose 5%. 1000 milliLiter(s) (50 mL/Hr) IV Continuous <Continuous>  dextrose 50% Injectable 12.5 Gram(s) IV Push once  dextrose 50% Injectable 25 Gram(s) IV Push once  dextrose 50% Injectable 25 Gram(s) IV Push once  enoxaparin Injectable 40 milliGRAM(s) SubCutaneous every 24 hours  insulin glargine Injectable (LANTUS) 17 Unit(s) SubCutaneous at bedtime  insulin lispro (HumaLOG) corrective regimen sliding scale   SubCutaneous three times a day before meals  insulin lispro (HumaLOG) corrective regimen sliding scale   SubCutaneous at bedtime  insulin lispro Injectable (HumaLOG) 10 Unit(s) SubCutaneous before dinner  insulin lispro Injectable (HumaLOG) 8 Unit(s) SubCutaneous before breakfast  insulin lispro Injectable (HumaLOG) 8 Unit(s) SubCutaneous before lunch  isosorbide   mononitrate ER Tablet (IMDUR) 30 milliGRAM(s) Oral daily  methylPREDNISolone sodium succinate Injectable 20 milliGRAM(s) IV Push every 6 hours  montelukast 10 milliGRAM(s) Oral daily  multivitamin 1 Tablet(s) Oral daily  nystatin    Suspension 989605 Unit(s) Oral four times a day  olmesartan 20 milliGRAM(s) Oral daily  rosuvastatin 10 milliGRAM(s) Oral at bedtime  theophylline ER Capsule 400 milliGRAM(s) Oral daily    MEDICATIONS  (PRN):  dextrose 40% Gel 15 Gram(s) Oral once PRN Blood Glucose LESS THAN 70 milliGRAM(s)/deciliter  glucagon  Injectable 1 milliGRAM(s) IntraMuscular once PRN Glucose LESS THAN 70 milligrams/deciliter  sodium chloride 0.65% Nasal 1 Spray(s) Both Nostrils two times a day PRN Nasal Congestion      Vital Signs Last 24 Hrs  T(C): 36.7 (07 Dec 2018 13:34), Max: 36.7 (07 Dec 2018 13:34)  T(F): 98.1 (07 Dec 2018 13:34), Max: 98.1 (07 Dec 2018 13:34)  HR: 92 (07 Dec 2018 13:34) (84 - 94)  BP: 137/74 (07 Dec 2018 13:34) (137/74 - 147/89)  BP(mean): --  RR: 18 (07 Dec 2018 13:34) (18 - 18)  SpO2: 98% (07 Dec 2018 13:34) (96% - 98%)    CAPILLARY BLOOD GLUCOSE      POCT Blood Glucose.: 191 mg/dL (07 Dec 2018 12:46)  POCT Blood Glucose.: 191 mg/dL (07 Dec 2018 10:25)  POCT Blood Glucose.: 107 mg/dL (06 Dec 2018 21:07)  POCT Blood Glucose.: 121 mg/dL (06 Dec 2018 17:51)  POCT Blood Glucose.: 62 mg/dL (06 Dec 2018 17:19)  POCT Blood Glucose.: 65 mg/dL (06 Dec 2018 17:16)    I&O's Summary    06 Dec 2018 07:01  -  07 Dec 2018 07:00  --------------------------------------------------------  IN: 1320 mL / OUT: 0 mL / NET: 1320 mL        PHYSICAL EXAM:  GENERAL: Looks stated age, NAD.  CARDIOVASCULAR: Normal S1, S2.  PULMONARY: Decreased breath sounds bilaterally, but lungs clear to auscultation with no wheezes/rales/rhonchi  GI: Abdomen soft, Nontender Bowel sounds present  MSK/Ext:  1+ leg edema.  No calf tenderness bilaterally  PSYCH: Normal Affect.       LABS:                        14.0   12.5  )-----------( 273      ( 06 Dec 2018 11:23 )             40.4     12-07    140  |  94<L>  |  33<H>  ----------------------------<  208<H>  5.8<H>   |  38<H>  |  1.08    Ca    9.9      07 Dec 2018 07:12                  RADIOLOGY & ADDITIONAL TESTS:    < from: Transthoracic Echocardiogram (12.07.18 @ 08:59) >  EF (Teicholtz): 71 %  Doppler Peak Velocity (m/sec): AoV=1.2  ------------------------------------------------------------------------  Observations:  Mitral Valve: Normal mitral valve.  Aortic Valve/Aorta: Normal trileaflet aortic valve. Peak  transaortic valve gradient equals 6 mm Hg, mean transaortic  valve gradient equals 3 mm Hg, aortic valve velocity time  integral equals 22 cm. Trace aortic regurgitation.  Aortic Root: 2.9 cm.  Left Atrium: Normal left atrium.  LA volume index = 18  cc/m2.  Left Ventricle: Normal left ventricular systolic function.  No segmental wall motion abnormalities. Normal left  ventricular internal dimensions and wall thicknesses. Mild  diastolic dysfunction (Stage I).  Right Heart: Normal right atrium. Normal right ventricular  size and function. Normal tricuspid valve. Minimal  tricuspid regurgitation. Normal pulmonic valve.  Pericardium/Pleura: Normal pericardium with no pericardial  effusion.  Hemodynamic: Estimated right atrial pressure is 8 mm Hg.  Inadequate tricuspid regurgitation Doppler envelope  precludes estimation of RVSP.  ------------------------------------------------------------------------  Conclusions:  1. Normal mitral valve.  2. Normal trileaflet aortic valve. Peak transaortic valve  gradient equals 6 mm Hg, mean transaortic valve gradient  equals 3 mm Hg, aortic valve velocity time integral equals  22 cm. Trace aortic regurgitation.  3. Aortic Root: 2.9 cm.  4. Normal left atrium.  LA volume index = 18 cc/m2.  5. Normal left ventricular internal dimensions and wall  thicknesses.  6. Normal left ventricular systolic function. No segmental  wall motion abnormalities.  7. Mild diastolic dysfunction (Stage I).  8. Normal right ventricular size and function.  9. Inadequate tricuspid regurgitation Doppler envelope  precludes estimation of RVSP.  10. Normal tricuspid valve. Minimal tricuspid  regurgitation.  *** Compared with echocardiogram report of 2/18/2014, no  significant changes noted.    < end of copied text >    < from: [facility] Duplex Lower Ext Vein Scan, Bilat (12.06.18 @ 15:40) >  IMPRESSION:     No evidence of bilateral lower extremity deep venous thrombosis.    < end of copied text >

## 2018-12-07 NOTE — PROGRESS NOTE ADULT - PROBLEM SELECTOR PLAN 7
Dopplers negative for DVT.  Echo report noted, mild diastolic dysfunction, no significant changes from prior study in 2014.  Suspect leg edema may be related to recent high dose steroids.  Vascular cardiology consult requested.  Need to monitor for any worsening leg edema with initiation of Norvasc.

## 2018-12-07 NOTE — CONSULT NOTE ADULT - ATTENDING COMMENTS
I have seen this patient with the fellow and agree with their assessment and plan.    d/w at length with patient and Hospitalist    Carol Elliott MD  Cell   Pager   Office     For weekend coverage  Dr. Amado Hdz( attending)  Dr. Hiren Ventura( fellow) I have seen this patient with the fellow and agree with their assessment and plan.  Hyperkalemia as stated above, agree to stop the benicar and use CCB for the BP control  Aggressive DMII control  Pt has met alk secondary to chronic resp acidosis from her COPD. No need to treat that  Diuresis could make that worse if planning diuresis  d/w at length with patient and Hospitalist    Carol Elliott MD  Cell   Pager   Office     For weekend coverage  Dr. Amado Hdz( attending)  Dr. Hiren Ventura( fellow)

## 2018-12-07 NOTE — CONSULT NOTE ADULT - PROBLEM SELECTOR RECOMMENDATION 9
in setting of ARB use and hyperglycemia.  - Would discontinue olmesartan (patient agreeable) and start on Norvasc 5mg PO daily. Increase Norvasc as need for BP control.   - Tight blood glucose monitoring while on steroids.   - Can give lasix for LE edema- should help with hyperK as well.   - Low K diet.  - Monitor K daily

## 2018-12-07 NOTE — PROGRESS NOTE ADULT - ASSESSMENT
ASSESSMENT:    slowly improving dyspnea with chronic hypoxic and hypercapnic respiratory failure due to very severe COPD with "exacerbation" - adequate oxygenation on a nasal canula in the setting of profound dyspnea suggests that alterations in the mechanics of breathing due to lung hyperinflation are playing a significant role in shortness of breath - there is evidence of CAD without pulmonary hypertension on the CT scan which is without pulmonary emboli    HTN/HLD/DM    CAD s/p PCI    PAD    lower extremity swelling likely related to steroid induced fluid retention - no evidence of DVT on lower extremity Duplex examination    PLAN/RECOMMENDATIONS:    oxygen supplementation to keep saturation greater than 92%  sputum culture - no growth  home trilogy vent has been arranged with [facility] - the patient is unwilling to use the hospital BIPAP machine  would consider low dose narcotics for dyspnea watching closely for sedation or worsening hypercapnia - would not start without consultation with Dr. Mathew  albuterol/atrovent nebs q4h holding 2AM dose  pulmicort 0.5mg nebs q12h  singulair/theophylline - off mucinex  continue solumedrol to 20mg IVP q6h - glucose control on high dose steroids - nystatin  cardiac meds: ASA/crestor/imdur/olmesartan  cardiology evaluation - ECHO pending  DVT prophylaxis - SQ lovenox  thigh high TOMAS stockings - change to medium  daily physical therapy - needs to walk multiple times a day as able  continue outpatient pulmonary rehabilitation  outpatient evaluation for lung transplantation ongoing    Will follow with you. Plan of care discussed with the patient at bedside and the dedicated floor NP. She will take many months to return to close to her baseline respiratory status following this exacerbation.    David Fair MD, [facility] - [phone]  Pulmonary Medicine ASSESSMENT:    slowly improving dyspnea with chronic hypoxic and hypercapnic respiratory failure due to very severe COPD with "exacerbation" - adequate oxygenation on a nasal canula in the setting of profound dyspnea suggests that alterations in the mechanics of breathing due to lung hyperinflation are playing a significant role in shortness of breath - there is evidence of CAD without pulmonary hypertension on the CT scan which is without pulmonary emboli    HTN/HLD/DM    CAD s/p PCI    PAD    lower extremity swelling likely related to steroid induced fluid retention - no evidence of DVT on lower extremity Duplex examination    PLAN/RECOMMENDATIONS:    oxygen supplementation to keep saturation greater than 92%  sputum culture - no growth  home trilogy vent has been arranged with [facility] - the patient is unwilling to use the hospital BIPAP machine  would consider low dose narcotics for dyspnea watching closely for sedation or worsening hypercapnia - would not start without consultation with Dr. Mathew  albuterol/atrovent nebs q4h holding 2AM dose  pulmicort 0.5mg nebs q12h  singulair/theophylline - off mucinex  continue solumedrol to 20mg IVP q6h - glucose control on high dose steroids - nystatin  cardiac meds: ASA/crestor/imdur/olmesartan  cardiology evaluation - ECHO with preserved LVEF, no evidence of valvular heart disease and no evidence of right ventricular dysfunction despite chronic hypoxemia  DVT prophylaxis - SQ lovenox  thigh high TOMAS stockings - change to medium  daily physical therapy - needs to walk multiple times a day as able  continue outpatient pulmonary rehabilitation  outpatient evaluation for lung transplantation ongoing    Will follow with you. Plan of care discussed with the patient at bedside and the dedicated floor NP. She will take many months to return to close to her baseline respiratory status following this exacerbation.    David Fair MD, [facility] - [phone]  Pulmonary Medicine

## 2018-12-07 NOTE — CONSULT NOTE ADULT - SUBJECTIVE AND OBJECTIVE BOX
[facility] DIVISION OF KIDNEY DISEASES AND HYPERTENSION -- INITIAL CONSULT NOTE  --------------------------------------------------------------------------------  HPI:  Pt is a 58yoF w/ advanced COPD on home O2 3L, HTN, HLD, CAD s/p 6 stents, DMII on metformin, PVD, who p/w progressive worsening dyspnea x 3 weeks c/w COPD exacerbation.     Renal now called for persistent hyperkalemia.  Pt says she has never had this issue before.  Follows w/ her PMD who checks her bloodwork every 6 weeks.   Denies starting any new medications.  Denies any Hx of renal disease, stating "my kidneys are fine!"  Denies any change in her diet.  Has been on Benicar for her BP for many years- says he BPs have been well controlled w/ her PMD as well.    Does not always takes steroids- tries to limit their use due to side effects. However at home due to her worsening SOB, she was taking PO prednisone, but it still did not improve her symptoms.  Says he sugars are usually well controlled- her diabetes is 'borderline" and she only takes metformin 500mg PO daily, which she increased to 1000mg daily while on steroids.    Still feeling SOB and fatigued.  Has some leg swelling today as well. Has leg swelling when she "dangles the leg" off the bed, but it usually resolved w/ leg elevation.    Says she is following at [facility] for a lung transplant, but is currently not on the list yet and she needs to have a few more test done (colon ca screening, pap smear, and a C at [facility]) and was told to lose 40lbs.       PAST HISTORY  --------------------------------------------------------------------------------  PAST MEDICAL & SURGICAL HISTORY:  Hyperlipemia  Chronic sinusitis  Raynaud phenomenon  HTN (hypertension)  DM (diabetes mellitus)  Claustrophobia  COPD (chronic obstructive pulmonary disease)  S/P tonsillectomy    FAMILY HISTORY:  FH: MI (myocardial infarction) (Father)  FH: CABG (coronary artery bypass surgery) (Father)    PAST SOCIAL HISTORY:    ALLERGIES & MEDICATIONS  --------------------------------------------------------------------------------  Allergies    No Known Allergies    Intolerances      Standing Inpatient Medications  ALBUTerol/ipratropium for Nebulization 3 milliLiter(s) Nebulizer <User Schedule>  artificial tears (preservative free) Ophthalmic Solution 1 Drop(s) Both EYES three times a day  aspirin enteric coated 81 milliGRAM(s) Oral daily  buDESOnide   0.5 milliGRAM(s) Respule 0.5 milliGRAM(s) Inhalation every 12 hours  cholecalciferol 2000 Unit(s) Oral daily  clarithromycin 500 milliGRAM(s) Oral two times a day  dextrose 5%. 1000 milliLiter(s) IV Continuous <Continuous>  dextrose 50% Injectable 12.5 Gram(s) IV Push once  dextrose 50% Injectable 25 Gram(s) IV Push once  dextrose 50% Injectable 25 Gram(s) IV Push once  enoxaparin Injectable 40 milliGRAM(s) SubCutaneous every 24 hours  insulin glargine Injectable (LANTUS) 17 Unit(s) SubCutaneous at bedtime  insulin lispro (HumaLOG) corrective regimen sliding scale   SubCutaneous three times a day before meals  insulin lispro (HumaLOG) corrective regimen sliding scale   SubCutaneous at bedtime  insulin lispro Injectable (HumaLOG) 10 Unit(s) SubCutaneous before dinner  insulin lispro Injectable (HumaLOG) 8 Unit(s) SubCutaneous before breakfast  insulin lispro Injectable (HumaLOG) 8 Unit(s) SubCutaneous before lunch  isosorbide   mononitrate ER Tablet (IMDUR) 30 milliGRAM(s) Oral daily  methylPREDNISolone sodium succinate Injectable 20 milliGRAM(s) IV Push every 6 hours  montelukast 10 milliGRAM(s) Oral daily  multivitamin 1 Tablet(s) Oral daily  nystatin    Suspension [id number] Unit(s) Oral four times a day  rosuvastatin 10 milliGRAM(s) Oral at bedtime  theophylline ER Capsule 400 milliGRAM(s) Oral daily    PRN Inpatient Medications  dextrose 40% Gel 15 Gram(s) Oral once PRN  glucagon  Injectable 1 milliGRAM(s) IntraMuscular once PRN  sodium chloride 0.65% Nasal 1 Spray(s) Both Nostrils two times a day PRN      REVIEW OF SYSTEMS  --------------------------------------------------------------------------------  Gen: No weight changes, + fatigue  Skin: No rashes  Head/Eyes/Ears/Mouth: No headache  Respiratory: ++ dyspnea, no cough  CV: No chest pain  GI: No abdominal pain, diarrhea, constipation, nausea, vomiting  : No increased frequency  MSK: + edema  Neuro: No dizziness/lightheadedness  Heme: No easy bruising or bleeding  Endo: No heat/cold intolerance  Psych: No significant nervousness    All other systems were reviewed and are negative, except as noted.    VITALS/PHYSICAL EXAM  --------------------------------------------------------------------------------  T(C): 36.7 (12-07-18 @ 13:34), Max: 36.7 (12-07-18 @ 13:34)  HR: 92 (12-07-18 @ 13:34) (84 - 94)  BP: 137/74 (12-07-18 @ 13:34) (137/74 - 147/89)  RR: 18 (12-07-18 @ 13:34) (18 - 18)  SpO2: 98% (12-07-18 @ 13:34) (96% - 98%)  Wt(kg): --        12-06-18 @ 07:01  -  12-07-18 @ 07:00  --------------------------------------------------------  IN: 1320 mL / OUT: 0 mL / NET: 1320 mL      Physical Exam:  	Gen: NAD, obese female  	HEENT: anicteric  	Pulm: b/l decreased air entry, no crackles  	CV: RRR, S1S2; no rub  	Back: No spinal or CVA tenderness; no sacral edema  	Abd: +BS, soft, nontender/nondistended  	: No suprapubic tenderness  	UE: Warm, FROM, no edema  	LE: Warm, b/l LE mild edema  	Neuro: awake, alert, answers questions  	Psych: Normal affect  	Skin: Warm, without rashes    LABS/STUDIES  --------------------------------------------------------------------------------              14.0   12.5  >-----------<  273      [12-06-18 @ 11:23]              40.4     140  |  94  |  33  ----------------------------<  208      [12-07-18 @ 07:12]  5.8   |  38  |  1.08        Ca     9.9     [12-07-18 @ 07:12]            Creatinine Trend:  SCr 1.08 [12-07 @ 07:12]  SCr 1.05 [12-06 @ 11:20]  SCr 0.98 [12-05 @ 18:09]  SCr 1.18 [12-05 @ 07:08]  SCr 1.10 [12-04 @ 09:31]        HbA1c 7.2      [11-30-18 @ 07:58]

## 2018-12-07 NOTE — PROVIDER CONTACT NOTE (OTHER) - SITUATION
Pt c/o constipation . Last bowel movement was on 12/3. Poor Po intake. Potassium elevated at 5.8. Bowel sounds present & abdomen soft & non-tender.

## 2018-12-07 NOTE — PROGRESS NOTE ADULT - PROBLEM SELECTOR PLAN 1
Continue solumedrol, dose slightly decreased to 20mg q6 from 30mg q8.  CTA with no PE, no PNA.  Continue Pulmicort, Duoneb ATC (with transition back to spiriva upon discharge), Theophylline, and Singulair.  Biaxin added by pulmonary.  Echo report noted, mild diastolic dysfunction, no significant changes from prior study in 2014.  Home Trilogy vent being arranged by pulmonary.  Case d/w Dr. Fair.

## 2018-12-07 NOTE — PROGRESS NOTE ADULT - PROBLEM SELECTOR PLAN 3
A1C 7.2, but with hyperglycemia in setting of Solumedrol use.  Continue Lantus 17 units with pre-meal Humalog 8-8-10 per endocrine.  Continue to monitor blood sugars.

## 2018-12-07 NOTE — PROGRESS NOTE ADULT - PROBLEM SELECTOR PLAN 4
Mild hyperkalemia.  Case d/w Dr. Elliott, suspect secondary to Benicar and hyperglycemia.  Will stop Benicar and start Norvasc 5mg daily instead.  Glycemic control as above.  Repeat BMP in AM.

## 2018-12-07 NOTE — PROGRESS NOTE ADULT - ASSESSMENT
59 y/o F w/h/o controlled T2DM on Metformin 500mg bid. Also h/o CAD and COPD. Here with COPD exacerbation on Methylprednisolone 40mg q8h. Still c/o SOB on and off. Tolerating POs with variable PO intake with hypoglycemia ac dinner for the past 2 days likely due to pt skipping some meals or not eating carbs. Spoke to pt about the need to eat carbs if she gets insulin injections ac meals. Pt instructed to tell team not to administer premeal insulin doses if not eating or not eating carbs. Pt verbalized understanding. Reviewed with pt different groups of carbs. BG goal 100 to 200 while on steroids. Will continue present insulin doses for now.

## 2018-12-08 LAB
ANION GAP SERPL CALC-SCNC: 11 MMOL/L — SIGNIFICANT CHANGE UP (ref 5–17)
ANION GAP SERPL CALC-SCNC: 7 MMOL/L — SIGNIFICANT CHANGE UP (ref 5–17)
B-OH-BUTYR SERPL-SCNC: 0.1 MMOL/L — SIGNIFICANT CHANGE UP
BUN SERPL-MCNC: 38 MG/DL — HIGH (ref 7–23)
BUN SERPL-MCNC: 41 MG/DL — HIGH (ref 7–23)
CALCIUM SERPL-MCNC: 8.9 MG/DL — SIGNIFICANT CHANGE UP (ref 8.4–10.5)
CALCIUM SERPL-MCNC: 9.7 MG/DL — SIGNIFICANT CHANGE UP (ref 8.4–10.5)
CHLORIDE SERPL-SCNC: 92 MMOL/L — LOW (ref 96–108)
CHLORIDE SERPL-SCNC: 96 MMOL/L — SIGNIFICANT CHANGE UP (ref 96–108)
CO2 SERPL-SCNC: 33 MMOL/L — HIGH (ref 22–31)
CO2 SERPL-SCNC: 37 MMOL/L — HIGH (ref 22–31)
CREAT SERPL-MCNC: 1.09 MG/DL — SIGNIFICANT CHANGE UP (ref 0.5–1.3)
CREAT SERPL-MCNC: 1.2 MG/DL — SIGNIFICANT CHANGE UP (ref 0.5–1.3)
GAS PNL BLDA: SIGNIFICANT CHANGE UP
GLUCOSE BLDC GLUCOMTR-MCNC: 118 MG/DL — HIGH (ref 70–99)
GLUCOSE BLDC GLUCOMTR-MCNC: 224 MG/DL — HIGH (ref 70–99)
GLUCOSE BLDC GLUCOMTR-MCNC: 250 MG/DL — HIGH (ref 70–99)
GLUCOSE BLDC GLUCOMTR-MCNC: 425 MG/DL — HIGH (ref 70–99)
GLUCOSE BLDC GLUCOMTR-MCNC: 426 MG/DL — HIGH (ref 70–99)
GLUCOSE SERPL-MCNC: 280 MG/DL — HIGH (ref 70–99)
GLUCOSE SERPL-MCNC: 410 MG/DL — HIGH (ref 70–99)
HCT VFR BLD CALC: 40.4 % — SIGNIFICANT CHANGE UP (ref 34.5–45)
HGB BLD-MCNC: 12.6 G/DL — SIGNIFICANT CHANGE UP (ref 11.5–15.5)
MCHC RBC-ENTMCNC: 28 PG — SIGNIFICANT CHANGE UP (ref 27–34)
MCHC RBC-ENTMCNC: 31.2 GM/DL — LOW (ref 32–36)
MCV RBC AUTO: 89.8 FL — SIGNIFICANT CHANGE UP (ref 80–100)
PLATELET # BLD AUTO: 240 K/UL — SIGNIFICANT CHANGE UP (ref 150–400)
POTASSIUM SERPL-MCNC: 4.6 MMOL/L — SIGNIFICANT CHANGE UP (ref 3.5–5.3)
POTASSIUM SERPL-MCNC: 6 MMOL/L — HIGH (ref 3.5–5.3)
POTASSIUM SERPL-SCNC: 4.6 MMOL/L — SIGNIFICANT CHANGE UP (ref 3.5–5.3)
POTASSIUM SERPL-SCNC: 6 MMOL/L — HIGH (ref 3.5–5.3)
RBC # BLD: 4.5 M/UL — SIGNIFICANT CHANGE UP (ref 3.8–5.2)
RBC # FLD: 13.5 % — SIGNIFICANT CHANGE UP (ref 10.3–14.5)
SODIUM SERPL-SCNC: 136 MMOL/L — SIGNIFICANT CHANGE UP (ref 135–145)
SODIUM SERPL-SCNC: 140 MMOL/L — SIGNIFICANT CHANGE UP (ref 135–145)
WBC # BLD: 11.84 K/UL — HIGH (ref 3.8–10.5)
WBC # FLD AUTO: 11.84 K/UL — HIGH (ref 3.8–10.5)

## 2018-12-08 PROCEDURE — 93010 ELECTROCARDIOGRAM REPORT: CPT

## 2018-12-08 PROCEDURE — 99254 IP/OBS CNSLTJ NEW/EST MOD 60: CPT

## 2018-12-08 PROCEDURE — 99233 SBSQ HOSP IP/OBS HIGH 50: CPT | Mod: GC

## 2018-12-08 PROCEDURE — 99232 SBSQ HOSP IP/OBS MODERATE 35: CPT

## 2018-12-08 PROCEDURE — 99233 SBSQ HOSP IP/OBS HIGH 50: CPT

## 2018-12-08 RX ORDER — PANTOPRAZOLE SODIUM 20 MG/1
40 TABLET, DELAYED RELEASE ORAL
Qty: 0 | Refills: 0 | Status: DISCONTINUED | OUTPATIENT
Start: 2018-12-08 | End: 2019-01-15

## 2018-12-08 RX ORDER — ACETAZOLAMIDE 250 MG/1
250 TABLET ORAL ONCE
Qty: 0 | Refills: 0 | Status: COMPLETED | OUTPATIENT
Start: 2018-12-08 | End: 2018-12-08

## 2018-12-08 RX ORDER — SODIUM POLYSTYRENE SULFONATE 4.1 MEQ/G
30 POWDER, FOR SUSPENSION ORAL ONCE
Qty: 0 | Refills: 0 | Status: DISCONTINUED | OUTPATIENT
Start: 2018-12-08 | End: 2018-12-08

## 2018-12-08 RX ORDER — INSULIN GLARGINE 100 [IU]/ML
20 INJECTION, SOLUTION SUBCUTANEOUS AT BEDTIME
Qty: 0 | Refills: 0 | Status: DISCONTINUED | OUTPATIENT
Start: 2018-12-08 | End: 2018-12-09

## 2018-12-08 RX ORDER — ACETAZOLAMIDE 250 MG/1
250 TABLET ORAL ONCE
Qty: 0 | Refills: 0 | Status: DISCONTINUED | OUTPATIENT
Start: 2018-12-08 | End: 2018-12-08

## 2018-12-08 RX ORDER — SODIUM POLYSTYRENE SULFONATE 4.1 MEQ/G
15 POWDER, FOR SUSPENSION ORAL ONCE
Qty: 0 | Refills: 0 | Status: COMPLETED | OUTPATIENT
Start: 2018-12-08 | End: 2018-12-08

## 2018-12-08 RX ORDER — ALPRAZOLAM 0.25 MG
0.25 TABLET ORAL ONCE
Qty: 0 | Refills: 0 | Status: DISCONTINUED | OUTPATIENT
Start: 2018-12-08 | End: 2018-12-08

## 2018-12-08 RX ORDER — LIDOCAINE AND PRILOCAINE CREAM 25; 25 MG/G; MG/G
1 CREAM TOPICAL ONCE
Qty: 0 | Refills: 0 | Status: COMPLETED | OUTPATIENT
Start: 2018-12-08 | End: 2018-12-08

## 2018-12-08 RX ADMIN — Medication 8: at 21:21

## 2018-12-08 RX ADMIN — Medication 20 MILLIGRAM(S): at 06:29

## 2018-12-08 RX ADMIN — Medication 3 MILLILITER(S): at 06:14

## 2018-12-08 RX ADMIN — INSULIN GLARGINE 20 UNIT(S): 100 INJECTION, SOLUTION SUBCUTANEOUS at 21:19

## 2018-12-08 RX ADMIN — Medication 0.5 MILLIGRAM(S): at 18:19

## 2018-12-08 RX ADMIN — Medication 0.5 MILLIGRAM(S): at 06:29

## 2018-12-08 RX ADMIN — Medication 1 DROP(S): at 06:28

## 2018-12-08 RX ADMIN — Medication 20 MILLIGRAM(S): at 23:09

## 2018-12-08 RX ADMIN — Medication 3 MILLILITER(S): at 18:19

## 2018-12-08 RX ADMIN — Medication 1 TABLET(S): at 13:08

## 2018-12-08 RX ADMIN — SENNA PLUS 2 TABLET(S): 8.6 TABLET ORAL at 21:20

## 2018-12-08 RX ADMIN — Medication 500 MILLIGRAM(S): at 06:28

## 2018-12-08 RX ADMIN — Medication 400 MILLIGRAM(S): at 13:09

## 2018-12-08 RX ADMIN — Medication 1 DROP(S): at 13:08

## 2018-12-08 RX ADMIN — Medication 2000 UNIT(S): at 13:08

## 2018-12-08 RX ADMIN — Medication 20 MILLIGRAM(S): at 13:06

## 2018-12-08 RX ADMIN — ROSUVASTATIN CALCIUM 10 MILLIGRAM(S): 5 TABLET ORAL at 21:20

## 2018-12-08 RX ADMIN — Medication 20 MILLIGRAM(S): at 18:20

## 2018-12-08 RX ADMIN — Medication 3 MILLILITER(S): at 13:07

## 2018-12-08 RX ADMIN — SODIUM POLYSTYRENE SULFONATE 15 GRAM(S): 4.1 POWDER, FOR SUSPENSION ORAL at 16:21

## 2018-12-08 RX ADMIN — Medication 0.25 MILLIGRAM(S): at 23:07

## 2018-12-08 RX ADMIN — Medication 500000 UNIT(S): at 06:28

## 2018-12-08 RX ADMIN — Medication 1 DROP(S): at 21:20

## 2018-12-08 RX ADMIN — Medication 10 UNIT(S): at 18:18

## 2018-12-08 RX ADMIN — ISOSORBIDE MONONITRATE 30 MILLIGRAM(S): 60 TABLET, EXTENDED RELEASE ORAL at 13:08

## 2018-12-08 RX ADMIN — LIDOCAINE AND PRILOCAINE CREAM 1 APPLICATION(S): 25; 25 CREAM TOPICAL at 23:29

## 2018-12-08 RX ADMIN — Medication 4: at 08:51

## 2018-12-08 RX ADMIN — Medication 500000 UNIT(S): at 23:30

## 2018-12-08 RX ADMIN — Medication 3 MILLILITER(S): at 21:19

## 2018-12-08 RX ADMIN — Medication 8 UNIT(S): at 13:07

## 2018-12-08 RX ADMIN — Medication 8 UNIT(S): at 08:50

## 2018-12-08 RX ADMIN — Medication 500000 UNIT(S): at 18:20

## 2018-12-08 RX ADMIN — Medication 4: at 18:18

## 2018-12-08 RX ADMIN — MONTELUKAST 10 MILLIGRAM(S): 4 TABLET, CHEWABLE ORAL at 13:08

## 2018-12-08 RX ADMIN — Medication 81 MILLIGRAM(S): at 13:08

## 2018-12-08 RX ADMIN — AMLODIPINE BESYLATE 5 MILLIGRAM(S): 2.5 TABLET ORAL at 06:28

## 2018-12-08 RX ADMIN — Medication 100 MILLIGRAM(S): at 13:08

## 2018-12-08 RX ADMIN — ACETAZOLAMIDE 250 MILLIGRAM(S): 250 TABLET ORAL at 18:14

## 2018-12-08 RX ADMIN — Medication 500 MILLIGRAM(S): at 18:20

## 2018-12-08 RX ADMIN — ENOXAPARIN SODIUM 40 MILLIGRAM(S): 100 INJECTION SUBCUTANEOUS at 21:21

## 2018-12-08 RX ADMIN — Medication 500000 UNIT(S): at 13:09

## 2018-12-08 NOTE — PROGRESS NOTE ADULT - ASSESSMENT
58-year-old woman with PMH of COPD on home O2 3L, HTN, HLD, CAD s/p 6 stents, Type 2 DM, PVD, p/w progressive worsening dyspnea x 2 weeks c/w COPD exacerbation not responsive to oral prednisone, now requiring IV steroids and bronchodilators.

## 2018-12-08 NOTE — PROGRESS NOTE ADULT - PROBLEM SELECTOR PLAN 4
K 6 today   suspect secondary to Benicar and hyperglycemia.  benicar stopped, c/w Norvasc 5mg daily instead.  Glycemic control as above.  Pt with metabolic alk 2/2 chronic resp acidosis   check abg today   give diamox 250mg IV x1   check bmp at 6 pm   check ekg   d/w renal appreciate recs K 6 today   suspect secondary to Benicar and hyperglycemia.  benicar stopped, c/w Norvasc 5mg daily   Glycemic control as above.  Pt with metabolic alk 2/2 chronic resp acidosis   check abg today   give diamox 250mg IV x1   give kayexalate  check bmp at 6 pm   check ekg   d/w renal appreciate recs

## 2018-12-08 NOTE — PROGRESS NOTE ADULT - SUBJECTIVE AND OBJECTIVE BOX
Diabetes Follow up note:  Interval Hx:  57 y/o F w/h/o controlled T2DM on Metformin 500mg bid. Also h/o CAD and COPD. Here with COPD exacerbation on Methylprednisolone now on steroid taper 20mg q6h. BG values in 200's since last night. Pt expressed frustration regarding lack of progress with condition. Tolerating POs despite not liking hospital food.     Review of Systems:  General: "I need a transplant"  GI: Tolerating POs without any N/V/D/ABD PAIN.  Resp: NIESLON.   ENDO: No S&Sx of hypoglycemia  MEDS:    insulin glargine Injectable (LANTUS) 17 Unit(s) SubCutaneous at bedtime  insulin lispro (HumaLOG) corrective regimen sliding scale   SubCutaneous three times a day before meals  insulin lispro (HumaLOG) corrective regimen sliding scale   SubCutaneous at bedtime  insulin lispro Injectable (HumaLOG) 10 Unit(s) SubCutaneous before dinner  insulin lispro Injectable (HumaLOG) 8 Unit(s) SubCutaneous before breakfast  insulin lispro Injectable (HumaLOG) 8 Unit(s) SubCutaneous before lunch  methylPREDNISolone sodium succinate Injectable 20 milliGRAM(s) IV Push every 6 hours  rosuvastatin 10 milliGRAM(s) Oral at bedtime    clarithromycin 500 milliGRAM(s) Oral two times a day  nystatin    Suspension [id number] Unit(s) Oral four times a day    Allergies    No Known Allergies        PE:  General: Female lying in bed. On O2.   Vital Signs Last 24 Hrs  T(C): 36.5 (08 Dec 2018 06:25), Max: 36.7 (07 Dec 2018 13:34)  T(F): 97.7 (08 Dec 2018 06:25), Max: 98.1 (07 Dec 2018 13:34)  HR: 82 (08 Dec 2018 06:25) (82 - 100)  BP: 142/74 (08 Dec 2018 06:25) (122/78 - 142/74)  BP(mean): --  RR: 20 (08 Dec 2018 06:25) (18 - 20)  SpO2: 100% (08 Dec 2018 06:25) (98% - 100%)  Abd: Soft, NT,ND, Obese.   Extremities: Warm. B/L trace edema. TOMAS stockings in place.   Neuro: A&O X3    LABS:    POCT Blood Glucose.: 224 mg/dL (12-08-18 @ 08:38)  POCT Blood Glucose.: 269 mg/dL (12-07-18 @ 21:46)  POCT Blood Glucose.: 153 mg/dL (12-07-18 @ 17:18)  POCT Blood Glucose.: 191 mg/dL (12-07-18 @ 12:46)  POCT Blood Glucose.: 191 mg/dL (12-07-18 @ 10:25)  POCT Blood Glucose.: 107 mg/dL (12-06-18 @ 21:07)  POCT Blood Glucose.: 121 mg/dL (12-06-18 @ 17:51)  POCT Blood Glucose.: 62 mg/dL (12-06-18 @ 17:19)  POCT Blood Glucose.: 65 mg/dL (12-06-18 @ 17:16)  POCT Blood Glucose.: 215 mg/dL (12-06-18 @ 12:41)  POCT Blood Glucose.: 247 mg/dL (12-06-18 @ 08:28)  POCT Blood Glucose.: 360 mg/dL (12-05-18 @ 22:05)  POCT Blood Glucose.: 71 mg/dL (12-05-18 @ 17:09)  POCT Blood Glucose.: 68 mg/dL (12-05-18 @ 17:08)  POCT Blood Glucose.: 167 mg/dL (12-05-18 @ 12:30)                            12.6   11.84 )-----------( 240      ( 08 Dec 2018 09:01 )             40.4       12-08    140  |  96  |  38<H>  ----------------------------<  280<H>  6.0<H>   |  37<H>  |  1.20    Ca    9.7      08 Dec 2018 07:14          Hemoglobin A1C, Whole Blood: 7.2 % <H> [4.0 - 5.6] (11-30-18 @ 07:58)            Contact number: kateryna [phone] or [phone]

## 2018-12-08 NOTE — PROGRESS NOTE ADULT - ASSESSMENT
57 y/o F w/h/o controlled T2DM on Metformin 500mg bid. Also h/o CAD and COPD. Here with COPD exacerbation on Methylprednisolone 20 mg q6h currently causing steroid induced hyperglycemia. BG values variable on present insulin regimen. Given slow wean and BG value fluctuation, will increase basal insulin. No clear pattern to change premeal regimen at this time. BG goal (100-200mg/dl). Pt will likely need 2nd agent to control glucose levels while on steroids at home.

## 2018-12-08 NOTE — PROGRESS NOTE ADULT - ASSESSMENT
58yoF w/ advanced COPD on home O2 3L (being evaluated at [facility] for lung transplant), HTN, HLD, CAD s/p 6 stents, DMII on metformin, PVD, who p/w progressive worsening dyspnea x 3 weeks c/w COPD exacerbation. Renal now called for persistent hyperkalemia.

## 2018-12-08 NOTE — PROGRESS NOTE ADULT - PROBLEM SELECTOR PLAN 3
A1C 7.2, but with hyperglycemia in setting of Solumedrol use.  Continue Lantus 20 units with pre-meal Humalog 8-8-10 per endocrine.  Continue to monitor blood sugars.

## 2018-12-08 NOTE — PROGRESS NOTE ADULT - PROBLEM SELECTOR PLAN 1
in setting of ARB use and hyperglycemia, most likey due to low elida/renin statel.   - Benicar held yesterday. On amlodipine now K still elevated.   - Pt hx chronic met alkalosis, suggest to give 1 dose of Lasix 40 mg IV for today. Kayexalate, and continue controlling Glucose.   - Low K diet.  - Monitor K daily. in setting of ARB use and hyperglycemia, most likey due to low elida/renin statel.   - Benicar held yesterday. On amlodipine now K still elevated.   - Pt hx chronic met alkalosis, case discussed with medicine, start acetazolamide  250 mg IV. Kayexalate, and continue controlling Glucose.   - Low K diet.  - Monitor K daily.

## 2018-12-08 NOTE — CHART NOTE - NSCHARTNOTEFT_GEN_A_CORE
Medicine PA Note     GUY HARTMAN  MRN-[id number]    58 F PMH COPD on home O2 3L, HTN, HLD, CAD s/p x6 stents, T2DM, pHTN, PVD     Notified by RN for ABG results, PH7.3, Pco2 72 bicarb 36. Patient seen and evaluated at bedside in NAD, breathing well on 3L NC endorsing no CP/SOB/N/V/D weakness or abdominal pain. Subsequent ABG and f/u FS showing glucose 426, patient diabetic on IV steroids. Patient hyperkalemic at 6, during AM labs, given kayexalte, on ABG w/lytes K 4.4.     Vital Signs Last 24 Hrs  T(C): 36.8 (12-08-18 @ 19:25), Max: 36.8 (12-08-18 @ 19:25)  T(F): 98.2 (12-08-18 @ 19:25), Max: 98.2 (12-08-18 @ 19:25)  HR: 105 (12-08-18 @ 19:25) (82 - 105)  BP: 146/68 (12-08-18 @ 19:25) (123/74 - 146/68)  BP(mean): --  RR: 20 (12-08-18 @ 19:25) (20 - 20)  SpO2: 97% (12-08-18 @ 19:25) (97% - 100%)  Daily     Daily   I&O's Summary    07 Dec 2018 07:01  -  08 Dec 2018 07:00  --------------------------------------------------------  IN: 100 mL / OUT: 0 mL / NET: 100 mL    08 Dec 2018 07:01  -  08 Dec 2018 21:33  --------------------------------------------------------  IN: 480 mL / OUT: 0 mL / NET: 480 mL      CAPILLARY BLOOD GLUCOSE                          12.6   11.84 )-----------( 240      ( 08 Dec 2018 09:01 )             40.4     12-08    140  |  96  |  38<H>  ----------------------------<  280<H>  6.0<H>   |  37<H>  |  1.20    Ca    9.7      08 Dec 2018 07:14            ABG - ( 08 Dec 2018 20:50 )  pH, Arterial: 7.32  pH, Blood: x     /  pCO2: 72    /  pO2: 80    / HCO3: 36    / Base Excess: 7.7   /  SaO2: 95              PHYSICAL EXAM:  GENERAL: NAD,   CHEST/LUNG: dec breath sounds bilaterally  HEART: Regular rate and rhythm;   ABDOMEN: Soft, Nontender, Nondistended; Bowel sounds present  PSYCH: AAOx3    Assessment/Plan:   #hypercarbnia likely chronic 2/2 COPD   - D/W [facility] Dr. Sahu per his recommendations:  - ABG with PH 7.32 Pco2 72, Hco3 36   - Bipap 10/5 fio2 40 with 1hr s/p ABG f/u, patient educated on importance and risks of refusing treatment, can potentially lead to intubation and ultimately death.   - c/w IV steroids   - f/u pulmonary  - will continue to monitor   - will endorse to AM team    #Hyperkalemia    - D/W [facility] Dr. Sahu per his recommendations:  - AM BMP K 6, given kayexlate 15mg x1, ordered for duoneb 5 times a day. given 10pm dose early, will give additional as needed.   - ABG with lytes showing K 4.4   - will send stat repeat BMP to evaluate   - will continue to monitor   - will endorse to AM team     #hyperglycemia  low likely-mills DKA, likely 2/2 steroids   - D/W [facility] Dr. Sahu per his recommendations:  - Patient followed by endocrine on premeal insulin / lantus 20u at night and ISS  - patient on IV steroids for COPD exacerbations   - given lantus 20, ISS 8u now   - betahydroxy ordered will f/u   - no anion gap on BMP  - f/u endocrine   - will continue to monitor   - will endorse to AM team       Hammad Piper PA-C,   Department of Medicine Medicine PA Note     GUY HARTMAN  MRN-[id number]    58 F PMH COPD on home O2 3L, HTN, HLD, CAD s/p x6 stents, T2DM, pHTN, PVD     Notified by RN for ABG results, PH7.3, Pco2 72 bicarb 36. Patient seen and evaluated at bedside in NAD, breathing well on 3L NC endorsing no CP/SOB/N/V/D weakness or abdominal pain. Subsequent ABG and f/u FS showing glucose 426, patient diabetic on IV steroids. Patient hyperkalemic at 6, during AM labs, given kayexalte, on ABG w/lytes K 4.4.     Vital Signs Last 24 Hrs  T(C): 36.8 (12-08-18 @ 19:25), Max: 36.8 (12-08-18 @ 19:25)  T(F): 98.2 (12-08-18 @ 19:25), Max: 98.2 (12-08-18 @ 19:25)  HR: 105 (12-08-18 @ 19:25) (82 - 105)  BP: 146/68 (12-08-18 @ 19:25) (123/74 - 146/68)  BP(mean): --  RR: 20 (12-08-18 @ 19:25) (20 - 20)  SpO2: 97% (12-08-18 @ 19:25) (97% - 100%)  Daily     Daily   I&O's Summary    07 Dec 2018 07:01  -  08 Dec 2018 07:00  --------------------------------------------------------  IN: 100 mL / OUT: 0 mL / NET: 100 mL    08 Dec 2018 07:01  -  08 Dec 2018 21:33  --------------------------------------------------------  IN: 480 mL / OUT: 0 mL / NET: 480 mL      CAPILLARY BLOOD GLUCOSE                          12.6   11.84 )-----------( 240      ( 08 Dec 2018 09:01 )             40.4     12-08    140  |  96  |  38<H>  ----------------------------<  280<H>  6.0<H>   |  37<H>  |  1.20    Ca    9.7      08 Dec 2018 07:14            ABG - ( 08 Dec 2018 20:50 )  pH, Arterial: 7.32  pH, Blood: x     /  pCO2: 72    /  pO2: 80    / HCO3: 36    / Base Excess: 7.7   /  SaO2: 95              PHYSICAL EXAM:  GENERAL: NAD,   CHEST/LUNG: dec breath sounds bilaterally  HEART: Regular rate and rhythm;   ABDOMEN: Soft, Nontender, Nondistended; Bowel sounds present  PSYCH: AAOx3    Assessment/Plan:   #hypercarbnia likely chronic 2/2 COPD   - D/W [facility] Dr. Sahu per his recommendations:  - ABG with PH 7.32 Pco2 72, Hco3 36   - Bipap 10/5 fio2 40 with 1hr s/p ABG f/u, patient educated on importance and risks of refusing treatment, can potentially lead to intubation and ultimately death.   - emla x 1 topical lidocaine for ABG stick  - c/w IV steroids   - f/u pulmonary  - will continue to monitor   - will endorse to AM team    #Hyperkalemia    - D/W [facility] Dr. Sahu per his recommendations:  - AM BMP K 6, given kayexlate 15mg x1, ordered for duoneb 5 times a day. given 10pm dose early, will give additional as needed.   - ABG with lytes showing K 4.4   - will send stat repeat BMP to evaluate   - will continue to monitor   - will endorse to AM team     #hyperglycemia  low likely-mills DKA, likely 2/2 steroids   - D/W [facility] Dr. Sahu per his recommendations:  - Patient followed by endocrine on premeal insulin / lantus 20u at night and ISS  - patient on IV steroids for COPD exacerbations   - given lantus 20, ISS 8u now   - betahydroxy ordered will f/u   - no anion gap on BMP  - f/u endocrine   - will continue to monitor   - will endorse to AM team       Hammad Piper PA-C,   Department of Medicine Medicine PA Note     GUY HARTMAN  [id number]    58 F PMH COPD on home O2 3L, HTN, HLD, CAD s/p x6 stents, T2DM, pHTN, PVD     Notified by RN for ABG results, PH7.3, Pco2 72 bicarb 36. Patient seen and evaluated at bedside in NAD, breathing well on 3L NC endorsing no CP/SOB/N/V/D weakness or abdominal pain. Subsequent ABG and f/u FS showing glucose 426, patient diabetic on IV steroids. Patient hyperkalemic at 6, during AM labs, given kayexalte, on ABG w/lytes K 4.4.     Vital Signs Last 24 Hrs  T(C): 36.8 (12-08-18 @ 19:25), Max: 36.8 (12-08-18 @ 19:25)  T(F): 98.2 (12-08-18 @ 19:25), Max: 98.2 (12-08-18 @ 19:25)  HR: 105 (12-08-18 @ 19:25) (82 - 105)  BP: 146/68 (12-08-18 @ 19:25) (123/74 - 146/68)  BP(mean): --  RR: 20 (12-08-18 @ 19:25) (20 - 20)  SpO2: 97% (12-08-18 @ 19:25) (97% - 100%)  Daily     Daily   I&O's Summary    07 Dec 2018 07:01  -  08 Dec 2018 07:00  --------------------------------------------------------  IN: 100 mL / OUT: 0 mL / NET: 100 mL    08 Dec 2018 07:01  -  08 Dec 2018 21:33  --------------------------------------------------------  IN: 480 mL / OUT: 0 mL / NET: 480 mL      CAPILLARY BLOOD GLUCOSE                          12.6   11.84 )-----------( 240      ( 08 Dec 2018 09:01 )             40.4     12-08    140  |  96  |  38<H>  ----------------------------<  280<H>  6.0<H>   |  37<H>  |  1.20    Ca    9.7      08 Dec 2018 07:14            ABG - ( 08 Dec 2018 20:50 )  pH, Arterial: 7.32  pH, Blood: x     /  pCO2: 72    /  pO2: 80    / HCO3: 36    / Base Excess: 7.7   /  SaO2: 95              PHYSICAL EXAM:  GENERAL: NAD,   CHEST/LUNG: dec breath sounds bilaterally  HEART: Regular rate and rhythm;   ABDOMEN: Soft, Nontender, Nondistended; Bowel sounds present  PSYCH: AAOx3    Assessment/Plan:   #hypercarbnia likely chronic 2/2 COPD   - D/W [facility] Dr. Sahu per his recommendations:  - ABG with PH 7.32 Pco2 72, Hco3 36   - Bipap 10/5 fio2 40 with 1hr s/p ABG f/u, patient educated on importance and risks of refusing treatment, can potentially lead to intubation and ultimately death.   - emla x 1 topical lidocaine for ABG stick  - c/w IV steroids   - f/u pulmonary  - will continue to monitor   - will endorse to AM team    #Hyperkalemia    - D/W [facility] Dr. Sahu per his recommendations:  - AM BMP K 6, given kayexlate 15mg x1, ordered for duoneb 5 times a day. given 10pm dose early, will give additional as needed.   - ABG with lytes showing K 4.4   - will send stat repeat BMP to evaluate -- > 4.6   - will continue to monitor   - will endorse to AM team     #hyperglycemia  low likely-mills DKA, likely 2/2 steroids   - D/W [facility] Dr. Sahu per his recommendations:  - Patient followed by endocrine on premeal insulin / lantus 20u at night and ISS  - patient on IV steroids for COPD exacerbations   - given lantus 20, ISS 8u now   - betahydroxy ordered will f/u   - no anion gap on BMP  - f/u endocrine   - will continue to monitor   - will endorse to AM team       Hammad Piper PA-C,   Department of Medicine Medicine PA Note     GUY HARTMAN  MRN-[id number]    58 F PMH COPD on home O2 3L, HTN, HLD, CAD s/p x6 stents, T2DM, pHTN, PVD     Notified by RN for ABG results, PH7.3, Pco2 72 bicarb 36. Patient seen and evaluated at bedside in NAD, breathing well on 3L NC endorsing no CP/SOB/N/V/D weakness or abdominal pain. Subsequent ABG and f/u FS showing glucose 426, patient diabetic on IV steroids. Patient hyperkalemic at 6, during AM labs, given kayexalte, on ABG w/lytes K 4.4.     Vital Signs Last 24 Hrs  T(C): 36.8 (12-08-18 @ 19:25), Max: 36.8 (12-08-18 @ 19:25)  T(F): 98.2 (12-08-18 @ 19:25), Max: 98.2 (12-08-18 @ 19:25)  HR: 105 (12-08-18 @ 19:25) (82 - 105)  BP: 146/68 (12-08-18 @ 19:25) (123/74 - 146/68)  BP(mean): --  RR: 20 (12-08-18 @ 19:25) (20 - 20)  SpO2: 97% (12-08-18 @ 19:25) (97% - 100%)  Daily     Daily   I&O's Summary    07 Dec 2018 07:01  -  08 Dec 2018 07:00  --------------------------------------------------------  IN: 100 mL / OUT: 0 mL / NET: 100 mL    08 Dec 2018 07:01  -  08 Dec 2018 21:33  --------------------------------------------------------  IN: 480 mL / OUT: 0 mL / NET: 480 mL      CAPILLARY BLOOD GLUCOSE                          12.6   11.84 )-----------( 240      ( 08 Dec 2018 09:01 )             40.4     12-08    140  |  96  |  38<H>  ----------------------------<  280<H>  6.0<H>   |  37<H>  |  1.20    Ca    9.7      08 Dec 2018 07:14            ABG - ( 08 Dec 2018 20:50 )  pH, Arterial: 7.32  pH, Blood: x     /  pCO2: 72    /  pO2: 80    / HCO3: 36    / Base Excess: 7.7   /  SaO2: 95              PHYSICAL EXAM:  GENERAL: NAD,   CHEST/LUNG: dec breath sounds bilaterally  HEART: Regular rate and rhythm;   ABDOMEN: Soft, Nontender, Nondistended; Bowel sounds present  PSYCH: AAOx3    Assessment/Plan:   #hypercarbnia likely chronic 2/2 COPD   - D/W [facility] Dr. Sahu per his recommendations:  - ABG with PH 7.32 Pco2 72, Hco3 36 --> f/u ABG with ph 7.32 / pco2 71 hco3 36 -- > per [facility] bipap inc 12/5, abg repeat in AM   - Bipap 10/5 fio2 40 with 1hr s/p ABG f/u, patient educated on importance and risks of refusing treatment, can potentially lead to intubation and ultimately death.   - emla x 1 topical lidocaine for ABG stick  - c/w IV steroids   - f/u pulmonary  - will continue to monitor   - will endorse to AM team    #Hyperkalemia    - D/W [facility] Dr. Sahu per his recommendations:  - AM BMP K 6, given kayexlate 15mg x1, ordered for duoneb 5 times a day. given 10pm dose early, will give additional as needed.   - ABG with lytes showing K 4.4   - will send stat repeat BMP to evaluate -- > 4.6   - will continue to monitor   - will endorse to AM team     #hyperglycemia  low likely-mills DKA, likely 2/2 steroids   - D/W [facility] Dr. Sahu per his recommendations:  - Patient followed by endocrine on premeal insulin / lantus 20u at night and ISS  - patient on IV steroids for COPD exacerbations   - given lantus 20, ISS 8u now   - betahydroxy ordered will f/u   - no anion gap on BMP  - 2am fs  - f/u endocrine   - will continue to monitor   - will endorse to AM team       Hammad Piper PA-C,   Department of Medicine Medicine PA Note     GUY HARTMAN  MRN-[id number]    58 F PMH COPD on home O2 3L, HTN, HLD, CAD s/p x6 stents, T2DM, pHTN, PVD     Notified by RN for ABG results, PH7.3, Pco2 72 bicarb 36. Patient seen and evaluated at bedside in NAD, breathing well on 3L NC endorsing no CP/SOB/N/V/D weakness or abdominal pain. Subsequent ABG and f/u FS showing glucose 426, patient diabetic on IV steroids. Patient hyperkalemic at 6, during AM labs, given kayexalte, on ABG w/lytes K 4.4.     Vital Signs Last 24 Hrs  T(C): 36.8 (12-08-18 @ 19:25), Max: 36.8 (12-08-18 @ 19:25)  T(F): 98.2 (12-08-18 @ 19:25), Max: 98.2 (12-08-18 @ 19:25)  HR: 105 (12-08-18 @ 19:25) (82 - 105)  BP: 146/68 (12-08-18 @ 19:25) (123/74 - 146/68)  BP(mean): --  RR: 20 (12-08-18 @ 19:25) (20 - 20)  SpO2: 97% (12-08-18 @ 19:25) (97% - 100%)  Daily     Daily   I&O's Summary    07 Dec 2018 07:01  -  08 Dec 2018 07:00  --------------------------------------------------------  IN: 100 mL / OUT: 0 mL / NET: 100 mL    08 Dec 2018 07:01  -  08 Dec 2018 21:33  --------------------------------------------------------  IN: 480 mL / OUT: 0 mL / NET: 480 mL      CAPILLARY BLOOD GLUCOSE                          12.6   11.84 )-----------( 240      ( 08 Dec 2018 09:01 )             40.4     12-08    140  |  96  |  38<H>  ----------------------------<  280<H>  6.0<H>   |  37<H>  |  1.20    Ca    9.7      08 Dec 2018 07:14            ABG - ( 08 Dec 2018 20:50 )  pH, Arterial: 7.32  pH, Blood: x     /  pCO2: 72    /  pO2: 80    / HCO3: 36    / Base Excess: 7.7   /  SaO2: 95              PHYSICAL EXAM:  GENERAL: NAD,   CHEST/LUNG: dec breath sounds bilaterally  HEART: Regular rate and rhythm;   ABDOMEN: Soft, Nontender, Nondistended; Bowel sounds present  PSYCH: AAOx3    Assessment/Plan:   #hypercarbnia likely chronic 2/2 COPD   - D/W [facility] Dr. Sahu per his recommendations:  - ABG with PH 7.32 Pco2 72, Hco3 36 --> f/u ABG with ph 7.32 / pco2 71 hco3 36 -- > per [facility] bipap inc 12/5, abg repeat in AM   - Bipap 10/5 fio2 40 with 1hr s/p ABG f/u, patient educated on importance and risks of refusing treatment, can potentially lead to intubation and ultimately death.   - emla x 1 topical lidocaine for ABG stick  - c/w IV steroids   - f/u pulmonary  - will continue to monitor   - will endorse to AM team    #Hyperkalemia    - D/W [facility] Dr. Sahu per his recommendations:  - AM BMP K 6, given kayexlate 15mg x1, ordered for duoneb 5 times a day. given 10pm dose early, will give additional as needed.   - ABG with lytes showing K 4.4   - will send stat repeat BMP to evaluate -- > 4.6   - will continue to monitor   - will endorse to AM team     #hyperglycemia  low likely-mills DKA, likely 2/2 steroids   - D/W [facility] Dr. Sahu per his recommendations:  - Patient followed by endocrine on premeal insulin / lantus 20u at night and ISS  - patient on IV steroids for COPD exacerbations   - given lantus 20, ISS 8u now   - betahydroxy ordered will f/u   - no anion gap on BMP  - 2am fs --> 232   - f/u endocrine   - will continue to monitor   - will endorse to AM team       Hammad Piper PA-C,   Department of Medicine

## 2018-12-08 NOTE — PROGRESS NOTE ADULT - SUBJECTIVE AND OBJECTIVE BOX
Afebrile. O2 sat 98% on 3 liters nasal o2.  Still with shortness of breath on very mild exertion.  Infrequent cough and sputum production.  No chest pain.  Less leg swelling today.    Vital Signs Last 24 Hrs  T(C): 36.3 (08 Dec 2018 11:24), Max: 36.7 (07 Dec 2018 13:34)  T(F): 97.3 (08 Dec 2018 11:24), Max: 98.1 (07 Dec 2018 13:34)  HR: 87 (08 Dec 2018 11:24) (82 - 100)  BP: 123/74 (08 Dec 2018 11:24) (122/78 - 142/74)  BP(mean): --  RR: 20 (08 Dec 2018 11:24) (18 - 20)  SpO2: 98% (08 Dec 2018 11:24) (98% - 100%)    Medications:  MEDICATIONS  (STANDING):  ALBUTerol/ipratropium for Nebulization 3 milliLiter(s) Nebulizer <User Schedule>  amLODIPine   Tablet 5 milliGRAM(s) Oral daily  artificial tears (preservative free) Ophthalmic Solution 1 Drop(s) Both EYES three times a day  aspirin enteric coated 81 milliGRAM(s) Oral daily  buDESOnide   0.5 milliGRAM(s) Respule 0.5 milliGRAM(s) Inhalation every 12 hours  cholecalciferol 2000 Unit(s) Oral daily  clarithromycin 500 milliGRAM(s) Oral two times a day  dextrose 5%. 1000 milliLiter(s) (50 mL/Hr) IV Continuous <Continuous>  dextrose 50% Injectable 12.5 Gram(s) IV Push once  dextrose 50% Injectable 25 Gram(s) IV Push once  dextrose 50% Injectable 25 Gram(s) IV Push once  docusate sodium 100 milliGRAM(s) Oral daily  enoxaparin Injectable 40 milliGRAM(s) SubCutaneous every 24 hours  insulin glargine Injectable (LANTUS) 20 Unit(s) SubCutaneous at bedtime  insulin lispro (HumaLOG) corrective regimen sliding scale   SubCutaneous three times a day before meals  insulin lispro (HumaLOG) corrective regimen sliding scale   SubCutaneous at bedtime  insulin lispro Injectable (HumaLOG) 10 Unit(s) SubCutaneous before dinner  insulin lispro Injectable (HumaLOG) 8 Unit(s) SubCutaneous before breakfast  insulin lispro Injectable (HumaLOG) 8 Unit(s) SubCutaneous before lunch  isosorbide   mononitrate ER Tablet (IMDUR) 30 milliGRAM(s) Oral daily  methylPREDNISolone sodium succinate Injectable 20 milliGRAM(s) IV Push every 6 hours  montelukast 10 milliGRAM(s) Oral daily  multivitamin 1 Tablet(s) Oral daily  nystatin    Suspension [id number] Unit(s) Oral four times a day  rosuvastatin 10 milliGRAM(s) Oral at bedtime  senna 2 Tablet(s) Oral at bedtime  theophylline ER Capsule 400 milliGRAM(s) Oral daily    MEDICATIONS  (PRN):  dextrose 40% Gel 15 Gram(s) Oral once PRN Blood Glucose LESS THAN 70 milliGRAM(s)/deciliter  glucagon  Injectable 1 milliGRAM(s) IntraMuscular once PRN Glucose LESS THAN 70 milligrams/deciliter  sodium chloride 0.65% Nasal 1 Spray(s) Both Nostrils two times a day PRN Nasal Congestion      Allergies    No Known Allergies    Intolerances        Physical Examination:  Pleasant  Neck: no JVD, LAD, accessory muscle use  PULM: Generalized decreased breath sounds bilaterally. Prolonged expiratory phase. Faint end expiratory wheezes on forced exhalation.  CVS: RR  Abdomen: nontender  Extremities: mild LE edema    LAB: Sputum culture ---> Normal respiratory richie    Plan:  1, Continue Duoneb/Budesonide nebulizer tx, Singulair, Theophylline.  2. Continue Solumedrol 20 mg IVP Q6hrs.  3. On Biaxin.  4. Baseline ABG on 3 liters nasal O2.  5. On SQ Lovenox.  6. Add Protonix.  7. Endocrine Rx as noted.    POC: David Caban M.D.  [phone]

## 2018-12-08 NOTE — PROGRESS NOTE ADULT - PROBLEM SELECTOR PLAN 1
Continue solumedrol 20mg q6  CTA with no PE, no PNA.  Continue Pulmicort, Duoneb ATC (with transition back to spiriva upon discharge), Theophylline, and Singulair.  Biaxin added by pulmonary.  Echo report noted, mild diastolic dysfunction, no significant changes from prior study in 2014.  Home Trilogy vent being arranged by pulmonary.

## 2018-12-08 NOTE — CHART NOTE - NSCHARTNOTEFT_GEN_A_CORE
Called by RN for pt refusing to have abg without having numbing medication (Lidoderm injection) to site 2/2 pain that pt will experience during arterial stick. . D/w Hospitalist, Dr. Leigh, and is aware that Lidoderm injection can not be done,   will follow up with repeat bmp.   BENJAMIN Bullard NP   [id number]

## 2018-12-08 NOTE — PROGRESS NOTE ADULT - PROBLEM SELECTOR PLAN 1
-test BG AC/HS  -Increase Lantus 20 units QHS  -c/w Humalog 8-8-10 w/meals for now.  -c/w Humalog moderate correction scale AC and Mod HS scale  -Please notify endocrine when steroids futher tapered. Will need adjustment to insulin regimen.  -discussed plan w/pt and team  pager: [phone]

## 2018-12-08 NOTE — PROGRESS NOTE ADULT - SUBJECTIVE AND OBJECTIVE BOX
[facility] DIVISION OF KIDNEY DISEASES AND HYPERTENSION -- FOLLOW UP NOTE  --------------------------------------------------------------------------------  Chief Complaint:  Hyperkalemia    24 hour events/subjective:  Pt examined at bed side, not in distress, following command, on continuous oxygen, claims LE improving, non oliguric.       PAST HISTORY  --------------------------------------------------------------------------------  No significant changes to PMH, PSH, FHx, SHx, unless otherwise noted    ALLERGIES & MEDICATIONS  --------------------------------------------------------------------------------  Allergies    No Known Allergies    Intolerances      Standing Inpatient Medications  ALBUTerol/ipratropium for Nebulization 3 milliLiter(s) Nebulizer <User Schedule>  amLODIPine   Tablet 5 milliGRAM(s) Oral daily  artificial tears (preservative free) Ophthalmic Solution 1 Drop(s) Both EYES three times a day  aspirin enteric coated 81 milliGRAM(s) Oral daily  buDESOnide   0.5 milliGRAM(s) Respule 0.5 milliGRAM(s) Inhalation every 12 hours  cholecalciferol 2000 Unit(s) Oral daily  clarithromycin 500 milliGRAM(s) Oral two times a day  dextrose 5%. 1000 milliLiter(s) IV Continuous <Continuous>  dextrose 50% Injectable 12.5 Gram(s) IV Push once  dextrose 50% Injectable 25 Gram(s) IV Push once  dextrose 50% Injectable 25 Gram(s) IV Push once  docusate sodium 100 milliGRAM(s) Oral daily  enoxaparin Injectable 40 milliGRAM(s) SubCutaneous every 24 hours  insulin glargine Injectable (LANTUS) 20 Unit(s) SubCutaneous at bedtime  insulin lispro (HumaLOG) corrective regimen sliding scale   SubCutaneous three times a day before meals  insulin lispro (HumaLOG) corrective regimen sliding scale   SubCutaneous at bedtime  insulin lispro Injectable (HumaLOG) 10 Unit(s) SubCutaneous before dinner  insulin lispro Injectable (HumaLOG) 8 Unit(s) SubCutaneous before breakfast  insulin lispro Injectable (HumaLOG) 8 Unit(s) SubCutaneous before lunch  isosorbide   mononitrate ER Tablet (IMDUR) 30 milliGRAM(s) Oral daily  methylPREDNISolone sodium succinate Injectable 20 milliGRAM(s) IV Push every 6 hours  montelukast 10 milliGRAM(s) Oral daily  multivitamin 1 Tablet(s) Oral daily  nystatin    Suspension [id number] Unit(s) Oral four times a day  pantoprazole    Tablet 40 milliGRAM(s) Oral before breakfast  rosuvastatin 10 milliGRAM(s) Oral at bedtime  senna 2 Tablet(s) Oral at bedtime  theophylline ER Capsule 400 milliGRAM(s) Oral daily    PRN Inpatient Medications  dextrose 40% Gel 15 Gram(s) Oral once PRN  glucagon  Injectable 1 milliGRAM(s) IntraMuscular once PRN  sodium chloride 0.65% Nasal 1 Spray(s) Both Nostrils two times a day PRN      REVIEW OF SYSTEMS  --------------------------------------------------------------------------------  Gen: No weight changes, + fatigue  Skin: No rashes  Head/Eyes/Ears/Mouth: No headache  Respiratory: ++ dyspnea, no cough  CV: No chest pain  GI: No abdominal pain, diarrhea, constipation, nausea, vomiting  : No increased frequency  MSK: + edema  Neuro: No dizziness/lightheadedness  Heme: No easy bruising or bleeding  Endo: No heat/cold intolerance  Psych: No significant nervousness    All other systems were reviewed and are negative, except as noted.    VITALS/PHYSICAL EXAM  --------------------------------------------------------------------------------  T(C): 36.3 (12-08-18 @ 11:24), Max: 36.7 (12-07-18 @ 13:34)  HR: 87 (12-08-18 @ 11:24) (82 - 100)  BP: 123/74 (12-08-18 @ 11:24) (122/78 - 142/74)  RR: 20 (12-08-18 @ 11:24) (18 - 20)  SpO2: 98% (12-08-18 @ 11:24) (98% - 100%)  Wt(kg): --        12-07-18 @ 07:01  -  12-08-18 @ 07:00  --------------------------------------------------------  IN: 100 mL / OUT: 0 mL / NET: 100 mL    12-08-18 @ 07:01  -  12-08-18 @ 12:58  --------------------------------------------------------  IN: 480 mL / OUT: 0 mL / NET: 480 mL      Physical Exam:  	Gen: NAD, obese female  	HEENT: anicteric  	Pulm: b/l decreased air entry, no crackles  	CV: RRR, S1S2; no rub  	Back: No spinal or CVA tenderness; no sacral edema  	Abd: +BS, soft, nontender/nondistended  	: No suprapubic tenderness  	UE: Warm, FROM, no edema  	LE: Warm, b/l LE mild edema  	Neuro: awake, alert, answers questions  	Psych: Normal affect  	Skin: Warm, without rashes    LABS/STUDIES  --------------------------------------------------------------------------------              12.6   11.84 >-----------<  240      [12-08-18 @ 09:01]              40.4     140  |  96  |  38  ----------------------------<  280      [12-08-18 @ 07:14]  6.0   |  37  |  1.20        Ca     9.7     [12-08-18 @ 07:14]            Creatinine Trend:  SCr 1.20 [12-08 @ 07:14]  SCr 1.08 [12-07 @ 07:12]  SCr 1.05 [12-06 @ 11:20]  SCr 0.98 [12-05 @ 18:09]  SCr 1.18 [12-05 @ 07:08]        HbA1c 7.2      [11-30-18 @ 07:58]

## 2018-12-08 NOTE — PROGRESS NOTE ADULT - PROBLEM SELECTOR PLAN 7
Dopplers negative for DVT.  Echo report noted, mild diastolic dysfunction, no significant changes from prior study in 2014.  Suspect leg edema may be related to recent high dose steroids.  C/w compression stockings   Need to monitor for any worsening leg edema with initiation of Norvasc.

## 2018-12-08 NOTE — PROGRESS NOTE ADULT - SUBJECTIVE AND OBJECTIVE BOX
Patient is a 58y old  Female who presents with a chief complaint of dyspnea (08 Dec 2018 12:58)      SUBJECTIVE / OVERNIGHT EVENTS: Still with marked dyspnea on minimal exertion, sob improved on rest, no cp, no n/v, had bm    MEDICATIONS  (STANDING):  acetazolamide  IVPB 250 milliGRAM(s) IV Intermittent once  ALBUTerol/ipratropium for Nebulization 3 milliLiter(s) Nebulizer <User Schedule>  amLODIPine   Tablet 5 milliGRAM(s) Oral daily  artificial tears (preservative free) Ophthalmic Solution 1 Drop(s) Both EYES three times a day  aspirin enteric coated 81 milliGRAM(s) Oral daily  buDESOnide   0.5 milliGRAM(s) Respule 0.5 milliGRAM(s) Inhalation every 12 hours  cholecalciferol 2000 Unit(s) Oral daily  clarithromycin 500 milliGRAM(s) Oral two times a day  dextrose 5%. 1000 milliLiter(s) (50 mL/Hr) IV Continuous <Continuous>  dextrose 50% Injectable 12.5 Gram(s) IV Push once  dextrose 50% Injectable 25 Gram(s) IV Push once  dextrose 50% Injectable 25 Gram(s) IV Push once  docusate sodium 100 milliGRAM(s) Oral daily  enoxaparin Injectable 40 milliGRAM(s) SubCutaneous every 24 hours  insulin glargine Injectable (LANTUS) 20 Unit(s) SubCutaneous at bedtime  insulin lispro (HumaLOG) corrective regimen sliding scale   SubCutaneous three times a day before meals  insulin lispro (HumaLOG) corrective regimen sliding scale   SubCutaneous at bedtime  insulin lispro Injectable (HumaLOG) 10 Unit(s) SubCutaneous before dinner  insulin lispro Injectable (HumaLOG) 8 Unit(s) SubCutaneous before breakfast  insulin lispro Injectable (HumaLOG) 8 Unit(s) SubCutaneous before lunch  isosorbide   mononitrate ER Tablet (IMDUR) 30 milliGRAM(s) Oral daily  methylPREDNISolone sodium succinate Injectable 20 milliGRAM(s) IV Push every 6 hours  montelukast 10 milliGRAM(s) Oral daily  multivitamin 1 Tablet(s) Oral daily  nystatin    Suspension [id number] Unit(s) Oral four times a day  pantoprazole    Tablet 40 milliGRAM(s) Oral before breakfast  rosuvastatin 10 milliGRAM(s) Oral at bedtime  senna 2 Tablet(s) Oral at bedtime  theophylline ER Capsule 400 milliGRAM(s) Oral daily    MEDICATIONS  (PRN):  dextrose 40% Gel 15 Gram(s) Oral once PRN Blood Glucose LESS THAN 70 milliGRAM(s)/deciliter  glucagon  Injectable 1 milliGRAM(s) IntraMuscular once PRN Glucose LESS THAN 70 milligrams/deciliter  sodium chloride 0.65% Nasal 1 Spray(s) Both Nostrils two times a day PRN Nasal Congestion        CAPILLARY BLOOD GLUCOSE      POCT Blood Glucose.: 118 mg/dL (08 Dec 2018 12:30)  POCT Blood Glucose.: 224 mg/dL (08 Dec 2018 08:38)  POCT Blood Glucose.: 269 mg/dL (07 Dec 2018 21:46)  POCT Blood Glucose.: 153 mg/dL (07 Dec 2018 17:18)    I&O's Summary    07 Dec 2018 07:01  -  08 Dec 2018 07:00  --------------------------------------------------------  IN: 100 mL / OUT: 0 mL / NET: 100 mL    08 Dec 2018 07:01  -  08 Dec 2018 13:35  --------------------------------------------------------  IN: 480 mL / OUT: 0 mL / NET: 480 mL        PHYSICAL EXAM:  GENERAL: NAD, well-developed  HEAD:  Atraumatic, Normocephalic  EYES: conjunctiva and sclera clear  NECK:  No JVD  CHEST/LUNG: decreased breath sounds bases, expiratory wheeze  HEART: Regular rate and rhythm; S1S2  ABDOMEN: Soft, Nontender, Nondistended; Bowel sounds present  EXTREMITIES:  +2 LE edema b/l   PSYCH: AAOx3  NEUROLOGY: non-focal      LABS:                        12.6   11.84 )-----------( 240      ( 08 Dec 2018 09:01 )             40.4     12-08    140  |  96  |  38<H>  ----------------------------<  280<H>  6.0<H>   |  37<H>  |  1.20    Ca    9.7      08 Dec 2018 07:14                RADIOLOGY & ADDITIONAL TESTS:    Imaging Personally Reviewed:    Consultant(s) Notes Reviewed:  renal, pulm , endo     Care Discussed with Consultants/Other Providers: renal

## 2018-12-09 LAB
ANION GAP SERPL CALC-SCNC: 9 MMOL/L — SIGNIFICANT CHANGE UP (ref 5–17)
BASE EXCESS BLDA CALC-SCNC: 6.6 MMOL/L — HIGH (ref -2–2)
BASE EXCESS BLDA CALC-SCNC: 7.6 MMOL/L — HIGH (ref -2–2)
BUN SERPL-MCNC: 37 MG/DL — HIGH (ref 7–23)
CALCIUM SERPL-MCNC: 9.2 MG/DL — SIGNIFICANT CHANGE UP (ref 8.4–10.5)
CHLORIDE SERPL-SCNC: 93 MMOL/L — LOW (ref 96–108)
CO2 BLDA-SCNC: 36 MMOL/L — HIGH (ref 22–30)
CO2 BLDA-SCNC: 38 MMOL/L — HIGH (ref 22–30)
CO2 SERPL-SCNC: 32 MMOL/L — HIGH (ref 22–31)
CREAT SERPL-MCNC: 1.01 MG/DL — SIGNIFICANT CHANGE UP (ref 0.5–1.3)
CULTURE RESULTS: SIGNIFICANT CHANGE UP
GAS PNL BLDA: SIGNIFICANT CHANGE UP
GLUCOSE BLDC GLUCOMTR-MCNC: 185 MG/DL — HIGH (ref 70–99)
GLUCOSE BLDC GLUCOMTR-MCNC: 200 MG/DL — HIGH (ref 70–99)
GLUCOSE BLDC GLUCOMTR-MCNC: 232 MG/DL — HIGH (ref 70–99)
GLUCOSE BLDC GLUCOMTR-MCNC: 235 MG/DL — HIGH (ref 70–99)
GLUCOSE BLDC GLUCOMTR-MCNC: 307 MG/DL — HIGH (ref 70–99)
GLUCOSE SERPL-MCNC: 250 MG/DL — HIGH (ref 70–99)
HCO3 BLDA-SCNC: 34 MMOL/L — HIGH (ref 21–29)
HCO3 BLDA-SCNC: 36 MMOL/L — HIGH (ref 21–29)
HCT VFR BLD CALC: 40.8 % — SIGNIFICANT CHANGE UP (ref 34.5–45)
HGB BLD-MCNC: 13.6 G/DL — SIGNIFICANT CHANGE UP (ref 11.5–15.5)
HOROWITZ INDEX BLDA+IHG-RTO: 30 — SIGNIFICANT CHANGE UP
HOROWITZ INDEX BLDA+IHG-RTO: 40 — SIGNIFICANT CHANGE UP
MAGNESIUM SERPL-MCNC: 2.2 MG/DL — SIGNIFICANT CHANGE UP (ref 1.6–2.6)
MCHC RBC-ENTMCNC: 29.1 PG — SIGNIFICANT CHANGE UP (ref 27–34)
MCHC RBC-ENTMCNC: 33.3 GM/DL — SIGNIFICANT CHANGE UP (ref 32–36)
MCV RBC AUTO: 87.4 FL — SIGNIFICANT CHANGE UP (ref 80–100)
PCO2 BLDA: 63 MMHG — HIGH (ref 32–46)
PCO2 BLDA: 71 MMHG — CRITICAL HIGH (ref 32–46)
PH BLDA: 7.32 — LOW (ref 7.35–7.45)
PH BLDA: 7.35 — SIGNIFICANT CHANGE UP (ref 7.35–7.45)
PHOSPHATE SERPL-MCNC: 4.2 MG/DL — SIGNIFICANT CHANGE UP (ref 2.5–4.5)
PLATELET # BLD AUTO: 252 K/UL — SIGNIFICANT CHANGE UP (ref 150–400)
PO2 BLDA: 124 MMHG — HIGH (ref 74–108)
PO2 BLDA: 145 MMHG — HIGH (ref 74–108)
POTASSIUM SERPL-MCNC: 5.2 MMOL/L — SIGNIFICANT CHANGE UP (ref 3.5–5.3)
POTASSIUM SERPL-SCNC: 5.2 MMOL/L — SIGNIFICANT CHANGE UP (ref 3.5–5.3)
RBC # BLD: 4.67 M/UL — SIGNIFICANT CHANGE UP (ref 3.8–5.2)
RBC # FLD: 13.7 % — SIGNIFICANT CHANGE UP (ref 10.3–14.5)
SAO2 % BLDA: 99 % — HIGH (ref 92–96)
SAO2 % BLDA: 99 % — HIGH (ref 92–96)
SODIUM SERPL-SCNC: 134 MMOL/L — LOW (ref 135–145)
SPECIMEN SOURCE: SIGNIFICANT CHANGE UP
WBC # BLD: 16.16 K/UL — HIGH (ref 3.8–10.5)
WBC # FLD AUTO: 16.16 K/UL — HIGH (ref 3.8–10.5)

## 2018-12-09 PROCEDURE — 99233 SBSQ HOSP IP/OBS HIGH 50: CPT

## 2018-12-09 PROCEDURE — 99233 SBSQ HOSP IP/OBS HIGH 50: CPT | Mod: GC

## 2018-12-09 RX ORDER — LIDOCAINE AND PRILOCAINE CREAM 25; 25 MG/G; MG/G
1 CREAM TOPICAL ONCE
Qty: 0 | Refills: 0 | Status: COMPLETED | OUTPATIENT
Start: 2018-12-09 | End: 2018-12-09

## 2018-12-09 RX ORDER — INSULIN GLARGINE 100 [IU]/ML
24 INJECTION, SOLUTION SUBCUTANEOUS AT BEDTIME
Qty: 0 | Refills: 0 | Status: DISCONTINUED | OUTPATIENT
Start: 2018-12-09 | End: 2018-12-10

## 2018-12-09 RX ORDER — INSULIN LISPRO 100/ML
8 VIAL (ML) SUBCUTANEOUS
Qty: 0 | Refills: 0 | Status: DISCONTINUED | OUTPATIENT
Start: 2018-12-09 | End: 2018-12-10

## 2018-12-09 RX ORDER — INSULIN LISPRO 100/ML
12 VIAL (ML) SUBCUTANEOUS
Qty: 0 | Refills: 0 | Status: DISCONTINUED | OUTPATIENT
Start: 2018-12-09 | End: 2018-12-10

## 2018-12-09 RX ORDER — POLYETHYLENE GLYCOL 3350 17 G/17G
17 POWDER, FOR SOLUTION ORAL ONCE
Qty: 0 | Refills: 0 | Status: COMPLETED | OUTPATIENT
Start: 2018-12-09 | End: 2018-12-09

## 2018-12-09 RX ORDER — INSULIN LISPRO 100/ML
9 VIAL (ML) SUBCUTANEOUS
Qty: 0 | Refills: 0 | Status: DISCONTINUED | OUTPATIENT
Start: 2018-12-09 | End: 2018-12-10

## 2018-12-09 RX ADMIN — Medication 1 DROP(S): at 13:04

## 2018-12-09 RX ADMIN — Medication 100 MILLIGRAM(S): at 11:28

## 2018-12-09 RX ADMIN — Medication 4: at 23:09

## 2018-12-09 RX ADMIN — LIDOCAINE AND PRILOCAINE CREAM 1 APPLICATION(S): 25; 25 CREAM TOPICAL at 11:11

## 2018-12-09 RX ADMIN — Medication 8 UNIT(S): at 09:07

## 2018-12-09 RX ADMIN — MONTELUKAST 10 MILLIGRAM(S): 4 TABLET, CHEWABLE ORAL at 11:27

## 2018-12-09 RX ADMIN — Medication 500 MILLIGRAM(S): at 06:12

## 2018-12-09 RX ADMIN — Medication 1 DROP(S): at 06:12

## 2018-12-09 RX ADMIN — Medication 0.5 MILLIGRAM(S): at 17:46

## 2018-12-09 RX ADMIN — Medication 20 MILLIGRAM(S): at 17:48

## 2018-12-09 RX ADMIN — Medication 20 MILLIGRAM(S): at 06:13

## 2018-12-09 RX ADMIN — ROSUVASTATIN CALCIUM 10 MILLIGRAM(S): 5 TABLET ORAL at 21:46

## 2018-12-09 RX ADMIN — Medication 3 MILLILITER(S): at 06:11

## 2018-12-09 RX ADMIN — Medication 81 MILLIGRAM(S): at 11:28

## 2018-12-09 RX ADMIN — SENNA PLUS 2 TABLET(S): 8.6 TABLET ORAL at 21:46

## 2018-12-09 RX ADMIN — Medication 0.5 MILLIGRAM(S): at 06:12

## 2018-12-09 RX ADMIN — Medication 500000 UNIT(S): at 06:12

## 2018-12-09 RX ADMIN — Medication 2000 UNIT(S): at 11:27

## 2018-12-09 RX ADMIN — Medication 3 MILLILITER(S): at 09:08

## 2018-12-09 RX ADMIN — Medication 400 MILLIGRAM(S): at 11:27

## 2018-12-09 RX ADMIN — Medication 1 DROP(S): at 23:09

## 2018-12-09 RX ADMIN — ENOXAPARIN SODIUM 40 MILLIGRAM(S): 100 INJECTION SUBCUTANEOUS at 21:46

## 2018-12-09 RX ADMIN — ISOSORBIDE MONONITRATE 30 MILLIGRAM(S): 60 TABLET, EXTENDED RELEASE ORAL at 11:28

## 2018-12-09 RX ADMIN — AMLODIPINE BESYLATE 5 MILLIGRAM(S): 2.5 TABLET ORAL at 06:12

## 2018-12-09 RX ADMIN — Medication 20 MILLIGRAM(S): at 11:28

## 2018-12-09 RX ADMIN — Medication 12 UNIT(S): at 17:45

## 2018-12-09 RX ADMIN — POLYETHYLENE GLYCOL 3350 17 GRAM(S): 17 POWDER, FOR SOLUTION ORAL at 17:54

## 2018-12-09 RX ADMIN — Medication 3 MILLILITER(S): at 23:08

## 2018-12-09 RX ADMIN — Medication 500000 UNIT(S): at 17:48

## 2018-12-09 RX ADMIN — Medication 4: at 09:07

## 2018-12-09 RX ADMIN — Medication 500000 UNIT(S): at 11:27

## 2018-12-09 RX ADMIN — Medication 500000 UNIT(S): at 23:11

## 2018-12-09 RX ADMIN — PANTOPRAZOLE SODIUM 40 MILLIGRAM(S): 20 TABLET, DELAYED RELEASE ORAL at 06:13

## 2018-12-09 RX ADMIN — INSULIN GLARGINE 24 UNIT(S): 100 INJECTION, SOLUTION SUBCUTANEOUS at 23:07

## 2018-12-09 RX ADMIN — Medication 500 MILLIGRAM(S): at 17:47

## 2018-12-09 RX ADMIN — Medication 2: at 13:02

## 2018-12-09 RX ADMIN — Medication 3 MILLILITER(S): at 17:55

## 2018-12-09 RX ADMIN — Medication 1 TABLET(S): at 11:28

## 2018-12-09 RX ADMIN — Medication 2: at 17:45

## 2018-12-09 RX ADMIN — Medication 3 MILLILITER(S): at 13:05

## 2018-12-09 RX ADMIN — Medication 9 UNIT(S): at 13:03

## 2018-12-09 RX ADMIN — Medication 20 MILLIGRAM(S): at 23:11

## 2018-12-09 NOTE — PROGRESS NOTE ADULT - SUBJECTIVE AND OBJECTIVE BOX
Chief Complaint: Type 2 DM.    History: FS still elevated. pt eating well. No other complaints.    MEDICATIONS  (STANDING):  ALBUTerol/ipratropium for Nebulization 3 milliLiter(s) Nebulizer <User Schedule>  amLODIPine   Tablet 5 milliGRAM(s) Oral daily  artificial tears (preservative free) Ophthalmic Solution 1 Drop(s) Both EYES three times a day  aspirin enteric coated 81 milliGRAM(s) Oral daily  buDESOnide   0.5 milliGRAM(s) Respule 0.5 milliGRAM(s) Inhalation every 12 hours  cholecalciferol 2000 Unit(s) Oral daily  clarithromycin 500 milliGRAM(s) Oral two times a day  dextrose 5%. 1000 milliLiter(s) (50 mL/Hr) IV Continuous <Continuous>  dextrose 50% Injectable 12.5 Gram(s) IV Push once  dextrose 50% Injectable 25 Gram(s) IV Push once  dextrose 50% Injectable 25 Gram(s) IV Push once  docusate sodium 100 milliGRAM(s) Oral daily  enoxaparin Injectable 40 milliGRAM(s) SubCutaneous every 24 hours  insulin glargine Injectable (LANTUS) 20 Unit(s) SubCutaneous at bedtime  insulin lispro (HumaLOG) corrective regimen sliding scale   SubCutaneous three times a day before meals  insulin lispro (HumaLOG) corrective regimen sliding scale   SubCutaneous at bedtime  insulin lispro Injectable (HumaLOG) 10 Unit(s) SubCutaneous before dinner  insulin lispro Injectable (HumaLOG) 8 Unit(s) SubCutaneous before breakfast  insulin lispro Injectable (HumaLOG) 8 Unit(s) SubCutaneous before lunch  isosorbide   mononitrate ER Tablet (IMDUR) 30 milliGRAM(s) Oral daily  methylPREDNISolone sodium succinate Injectable 20 milliGRAM(s) IV Push every 6 hours  montelukast 10 milliGRAM(s) Oral daily  multivitamin 1 Tablet(s) Oral daily  nystatin    Suspension [id number] Unit(s) Oral four times a day  pantoprazole    Tablet 40 milliGRAM(s) Oral before breakfast  rosuvastatin 10 milliGRAM(s) Oral at bedtime  senna 2 Tablet(s) Oral at bedtime  theophylline ER Capsule 400 milliGRAM(s) Oral daily    MEDICATIONS  (PRN):  dextrose 40% Gel 15 Gram(s) Oral once PRN Blood Glucose LESS THAN 70 milliGRAM(s)/deciliter  glucagon  Injectable 1 milliGRAM(s) IntraMuscular once PRN Glucose LESS THAN 70 milligrams/deciliter  polyethylene glycol 3350 17 Gram(s) Oral once PRN Constipation  sodium chloride 0.65% Nasal 1 Spray(s) Both Nostrils two times a day PRN Nasal Congestion      Allergies    No Known Allergies    Intolerances      PHYSICAL EXAM:  VITALS: T(C): 36.6 (12-09-18 @ 11:28)  T(F): 97.9 (12-09-18 @ 11:28), Max: 98.2 (12-08-18 @ 19:25)  HR: 90 (12-09-18 @ 11:28) (81 - 105)  BP: 132/78 (12-09-18 @ 11:28) (120/72 - 146/68)  RR:  (18 - 20)  SpO2:  (94% - 100%)  Wt(kg): --  GENERAL: NAD, well-groomed, well-developed  EYES: No proptosis, no lid lag, anicteric  HEENT:  Atraumatic, Normocephalic, moist mucous membranes  SKIN: Dry, intact, No rashes or lesions  MUSCULOSKELETAL: Full range of motion, normal strength  PSYCH: Alert and oriented x 3, normal affect, normal mood    POCT Blood Glucose.: 235 mg/dL (12-09-18 @ 08:32)  POCT Blood Glucose.: 232 mg/dL (12-09-18 @ 01:48)  POCT Blood Glucose.: 426 mg/dL (12-08-18 @ 21:01)  POCT Blood Glucose.: 425 mg/dL (12-08-18 @ 20:58)  POCT Blood Glucose.: 250 mg/dL (12-08-18 @ 17:28)  POCT Blood Glucose.: 118 mg/dL (12-08-18 @ 12:30)  POCT Blood Glucose.: 224 mg/dL (12-08-18 @ 08:38)  POCT Blood Glucose.: 269 mg/dL (12-07-18 @ 21:46)  POCT Blood Glucose.: 153 mg/dL (12-07-18 @ 17:18)  POCT Blood Glucose.: 191 mg/dL (12-07-18 @ 12:46)  POCT Blood Glucose.: 191 mg/dL (12-07-18 @ 10:25)  POCT Blood Glucose.: 107 mg/dL (12-06-18 @ 21:07)  POCT Blood Glucose.: 121 mg/dL (12-06-18 @ 17:51)  POCT Blood Glucose.: 62 mg/dL (12-06-18 @ 17:19)  POCT Blood Glucose.: 65 mg/dL (12-06-18 @ 17:16)  POCT Blood Glucose.: 215 mg/dL (12-06-18 @ 12:41)      12-09    134<L>  |  93<L>  |  37<H>  ----------------------------<  250<H>  5.2   |  32<H>  |  1.01    EGFR if : 70  EGFR if non : 60    Ca    9.2      12-09  Mg     2.2     12-09  Phos  4.2     12-09              Hemoglobin A1C, Whole Blood: 7.2 % <H> [4.0 - 5.6] (11-30-18 @ 07:58)

## 2018-12-09 NOTE — PROGRESS NOTE ADULT - PROBLEM SELECTOR PLAN 3
A1C 7.2, but with hyperglycemia in setting of Solumedrol use.  Continue Lantus 24 units with pre-meal Humalog 12-8-9 per endocrine.  Continue to monitor blood sugars.

## 2018-12-09 NOTE — PROGRESS NOTE ADULT - SUBJECTIVE AND OBJECTIVE BOX
[facility] DIVISION OF KIDNEY DISEASES AND HYPERTENSION -- FOLLOW UP NOTE  --------------------------------------------------------------------------------  Chief Complaint:  Hyperkalemia    24 hour events/subjective:    Pt examined at bed side, seems upset, depressed, stated at "least legs are slightly better' concerned about not getting on lung transplant list. Started on diamox with good response.     PAST HISTORY  --------------------------------------------------------------------------------  No significant changes to PMH, PSH, FHx, SHx, unless otherwise noted    ALLERGIES & MEDICATIONS  --------------------------------------------------------------------------------  Allergies    No Known Allergies    Intolerances      Standing Inpatient Medications  ALBUTerol/ipratropium for Nebulization 3 milliLiter(s) Nebulizer <User Schedule>  amLODIPine   Tablet 5 milliGRAM(s) Oral daily  artificial tears (preservative free) Ophthalmic Solution 1 Drop(s) Both EYES three times a day  aspirin enteric coated 81 milliGRAM(s) Oral daily  buDESOnide   0.5 milliGRAM(s) Respule 0.5 milliGRAM(s) Inhalation every 12 hours  cholecalciferol 2000 Unit(s) Oral daily  clarithromycin 500 milliGRAM(s) Oral two times a day  dextrose 5%. 1000 milliLiter(s) IV Continuous <Continuous>  dextrose 50% Injectable 12.5 Gram(s) IV Push once  dextrose 50% Injectable 25 Gram(s) IV Push once  dextrose 50% Injectable 25 Gram(s) IV Push once  docusate sodium 100 milliGRAM(s) Oral daily  enoxaparin Injectable 40 milliGRAM(s) SubCutaneous every 24 hours  insulin glargine Injectable (LANTUS) 24 Unit(s) SubCutaneous at bedtime  insulin lispro (HumaLOG) corrective regimen sliding scale   SubCutaneous three times a day before meals  insulin lispro (HumaLOG) corrective regimen sliding scale   SubCutaneous at bedtime  insulin lispro Injectable (HumaLOG) 12 Unit(s) SubCutaneous before dinner  insulin lispro Injectable (HumaLOG) 8 Unit(s) SubCutaneous before breakfast  insulin lispro Injectable (HumaLOG) 9 Unit(s) SubCutaneous before lunch  isosorbide   mononitrate ER Tablet (IMDUR) 30 milliGRAM(s) Oral daily  methylPREDNISolone sodium succinate Injectable 20 milliGRAM(s) IV Push every 6 hours  montelukast 10 milliGRAM(s) Oral daily  multivitamin 1 Tablet(s) Oral daily  nystatin    Suspension 434139 Unit(s) Oral four times a day  pantoprazole    Tablet 40 milliGRAM(s) Oral before breakfast  rosuvastatin 10 milliGRAM(s) Oral at bedtime  senna 2 Tablet(s) Oral at bedtime  theophylline ER Capsule 400 milliGRAM(s) Oral daily    PRN Inpatient Medications  dextrose 40% Gel 15 Gram(s) Oral once PRN  glucagon  Injectable 1 milliGRAM(s) IntraMuscular once PRN  polyethylene glycol 3350 17 Gram(s) Oral once PRN  sodium chloride 0.65% Nasal 1 Spray(s) Both Nostrils two times a day PRN      REVIEW OF SYSTEMS  --------------------------------------------------------------------------------  Gen: No weight changes, + fatigue  Skin: No rashes  Head/Eyes/Ears/Mouth: No headache  Respiratory: ++ dyspnea, no cough  CV: No chest pain  GI: No abdominal pain, diarrhea, constipation, nausea, vomiting  : No increased frequency  MSK: + edema  Neuro: No dizziness/lightheadedness  Heme: No easy bruising or bleeding  Endo: No heat/cold intolerance  Psych: No significant nervousness    VITALS/PHYSICAL EXAM  --------------------------------------------------------------------------------  T(C): 36.6 (12-09-18 @ 11:28), Max: 36.8 (12-08-18 @ 19:25)  HR: 90 (12-09-18 @ 11:28) (81 - 105)  BP: 132/78 (12-09-18 @ 11:28) (120/72 - 146/68)  RR: 20 (12-09-18 @ 11:28) (18 - 20)  SpO2: 99% (12-09-18 @ 11:28) (94% - 100%)  Wt(kg): --        12-08-18 @ 07:01  -  12-09-18 @ 07:00  --------------------------------------------------------  IN: 480 mL / OUT: 0 mL / NET: 480 mL    12-09-18 @ 07:01  -  12-09-18 @ 12:23  --------------------------------------------------------  IN: 240 mL / OUT: 0 mL / NET: 240 mL      Physical Exam:  	Gen: NAD, obese female  	HEENT: anicteric  	Pulm: b/l decreased air entry, no crackles  	CV: RRR, S1S2; no rub  	Back: No spinal or CVA tenderness; no sacral edema  	Abd: +BS, soft, nontender/nondistended  	: No suprapubic tenderness  	UE: Warm, FROM, no edema  	LE: Warm, b/l LE mild edema  	Neuro: awake, alert, answers questions  	Psych: Normal affect  	Skin: Warm, without rashes      LABS/STUDIES  --------------------------------------------------------------------------------              13.6   16.16 >-----------<  252      [12-09-18 @ 09:09]              40.8     134  |  93  |  37  ----------------------------<  250      [12-09-18 @ 07:49]  5.2   |  32  |  1.01        Ca     9.2     [12-09-18 @ 07:49]      Mg     2.2     [12-09-18 @ 07:49]      Phos  4.2     [12-09-18 @ 07:49]        Creatinine Trend:  SCr 1.01 [12-09 @ 07:49]  SCr 1.09 [12-08 @ 22:29]  SCr 1.20 [12-08 @ 07:14]  SCr 1.08 [12-07 @ 07:12]  SCr 1.05 [12-06 @ 11:20]        HbA1c 7.2      [11-30-18 @ 07:58]

## 2018-12-09 NOTE — PROGRESS NOTE ADULT - PROBLEM SELECTOR PLAN 1
in setting of ARB use and hyperglycemia, most likey due to low elida/renin statel.   - Benicar held yesterday. On amlodipine now K still elevated.   - Pt hx chronic met alkalosis, case discussed with medicine, continue with acetazolamide  250 mg IV. Continue controlling Glucose.   - Low K diet.  - Monitor K daily.  - Bicarb improved as well as k.

## 2018-12-09 NOTE — PROGRESS NOTE ADULT - PROBLEM SELECTOR PLAN 1
-test BG AC/HS  -Increase Lantus 24 units QHS  -increase to Humalog 8-9-12 units  -c/w Humalog moderate correction scale AC and Mod HS scale  -Please notify endocrine when steroids futher tapered. Will need adjustment to insulin regimen.

## 2018-12-09 NOTE — PROGRESS NOTE ADULT - ASSESSMENT
59 y/o F w/h/o controlled T2DM on Metformin 500mg bid. Also h/o CAD and COPD. Here with COPD exacerbation on Methylprednisolone 20 mg q6h currently causing steroid induced hyperglycemia. BG values variable on present insulin regimen.

## 2018-12-09 NOTE — PROGRESS NOTE ADULT - SUBJECTIVE AND OBJECTIVE BOX
Patient is a 58y old  Female who presents with a chief complaint of dyspnea (09 Dec 2018 12:23)      SUBJECTIVE / OVERNIGHT EVENTS: overnight events noted, pt still with dyspnea on ambulation to bathroom, no cp, has not moved bowels yet,    MEDICATIONS  (STANDING):  ALBUTerol/ipratropium for Nebulization 3 milliLiter(s) Nebulizer <User Schedule>  amLODIPine   Tablet 5 milliGRAM(s) Oral daily  artificial tears (preservative free) Ophthalmic Solution 1 Drop(s) Both EYES three times a day  aspirin enteric coated 81 milliGRAM(s) Oral daily  buDESOnide   0.5 milliGRAM(s) Respule 0.5 milliGRAM(s) Inhalation every 12 hours  cholecalciferol 2000 Unit(s) Oral daily  clarithromycin 500 milliGRAM(s) Oral two times a day  dextrose 5%. 1000 milliLiter(s) (50 mL/Hr) IV Continuous <Continuous>  dextrose 50% Injectable 12.5 Gram(s) IV Push once  dextrose 50% Injectable 25 Gram(s) IV Push once  dextrose 50% Injectable 25 Gram(s) IV Push once  docusate sodium 100 milliGRAM(s) Oral daily  enoxaparin Injectable 40 milliGRAM(s) SubCutaneous every 24 hours  insulin glargine Injectable (LANTUS) 24 Unit(s) SubCutaneous at bedtime  insulin lispro (HumaLOG) corrective regimen sliding scale   SubCutaneous three times a day before meals  insulin lispro (HumaLOG) corrective regimen sliding scale   SubCutaneous at bedtime  insulin lispro Injectable (HumaLOG) 12 Unit(s) SubCutaneous before dinner  insulin lispro Injectable (HumaLOG) 8 Unit(s) SubCutaneous before breakfast  insulin lispro Injectable (HumaLOG) 9 Unit(s) SubCutaneous before lunch  isosorbide   mononitrate ER Tablet (IMDUR) 30 milliGRAM(s) Oral daily  methylPREDNISolone sodium succinate Injectable 20 milliGRAM(s) IV Push every 6 hours  montelukast 10 milliGRAM(s) Oral daily  multivitamin 1 Tablet(s) Oral daily  nystatin    Suspension [id number] Unit(s) Oral four times a day  pantoprazole    Tablet 40 milliGRAM(s) Oral before breakfast  rosuvastatin 10 milliGRAM(s) Oral at bedtime  senna 2 Tablet(s) Oral at bedtime  theophylline ER Capsule 400 milliGRAM(s) Oral daily    MEDICATIONS  (PRN):  dextrose 40% Gel 15 Gram(s) Oral once PRN Blood Glucose LESS THAN 70 milliGRAM(s)/deciliter  glucagon  Injectable 1 milliGRAM(s) IntraMuscular once PRN Glucose LESS THAN 70 milligrams/deciliter  polyethylene glycol 3350 17 Gram(s) Oral once PRN Constipation  sodium chloride 0.65% Nasal 1 Spray(s) Both Nostrils two times a day PRN Nasal Congestion        CAPILLARY BLOOD GLUCOSE      POCT Blood Glucose.: 200 mg/dL (09 Dec 2018 12:43)  POCT Blood Glucose.: 235 mg/dL (09 Dec 2018 08:32)  POCT Blood Glucose.: 232 mg/dL (09 Dec 2018 01:48)  POCT Blood Glucose.: 426 mg/dL (08 Dec 2018 21:01)  POCT Blood Glucose.: 425 mg/dL (08 Dec 2018 20:58)  POCT Blood Glucose.: 250 mg/dL (08 Dec 2018 17:28)    I&O's Summary    08 Dec 2018 07:01  -  09 Dec 2018 07:00  --------------------------------------------------------  IN: 480 mL / OUT: 0 mL / NET: 480 mL    09 Dec 2018 07:01  -  09 Dec 2018 13:26  --------------------------------------------------------  IN: 240 mL / OUT: 0 mL / NET: 240 mL        PHYSICAL EXAM:  GENERAL: NAD, well-developed  HEAD:  Atraumatic, Normocephalic  EYES: conjunctiva and sclera clear  NECK: No JVD  CHEST/LUNG: decreased breath sounds b/l No wheeze  HEART: Regular rate and rhythm; S1S2  ABDOMEN: Soft, Nontender, Nondistended; Bowel sounds present  EXTREMITIES:  2+ Peripheral Pulses, No clubbing, cyanosis, or edema  PSYCH: AAOx3  NEUROLOGY: non-focal  SKIN: No rashes or lesions    LABS:                        13.6   16.16 )-----------( 252      ( 09 Dec 2018 09:09 )             40.8     12-09    134<L>  |  93<L>  |  37<H>  ----------------------------<  250<H>  5.2   |  32<H>  |  1.01    Ca    9.2      09 Dec 2018 07:49  Phos  4.2     12-09  Mg     2.2     12-09                RADIOLOGY & ADDITIONAL TESTS:    Imaging Personally Reviewed:    Consultant(s) Notes Reviewed:  renal     Care Discussed with Consultants/Other Providers: renal

## 2018-12-09 NOTE — PROGRESS NOTE ADULT - PROBLEM SELECTOR PLAN 4
improved today   suspect secondary to Benicar and hyperglycemia.  benicar stopped, c/w Norvasc 5mg daily   Glycemic control as above.  Pt with metabolic alk 2/2 chronic resp acidosis   s/p diamox and kayxelate 12/8   d/w renal appreciate recs

## 2018-12-09 NOTE — PROGRESS NOTE ADULT - ASSESSMENT
58yoF w/ advanced COPD on home O2 3L (being evaluated at [place] for lung transplant), HTN, HLD, CAD s/p 6 stents, DMII on metformin, PVD, who p/w progressive worsening dyspnea x 3 weeks c/w COPD exacerbation. Renal now called for persistent hyperkalemia.

## 2018-12-09 NOTE — PROGRESS NOTE ADULT - SUBJECTIVE AND OBJECTIVE BOX
Recent events noted.  Renal and Endocrine input appreciated.  Patient tolerated BiPAP overnite.  Afebrile. O2 sat 99% on 3 liters nasal O2.  Still with shortness of breath on very mild exertion.  Infrequent cough and sputum production.  No chest pain.  Leg swelling improving.    Vital Signs Last 24 Hrs  T(C): 36.6 (09 Dec 2018 11:28), Max: 36.8 (08 Dec 2018 19:25)  T(F): 97.9 (09 Dec 2018 11:28), Max: 98.2 (08 Dec 2018 19:25)  HR: 90 (09 Dec 2018 11:28) (81 - 105)  BP: 132/78 (09 Dec 2018 11:28) (120/72 - 146/68)  BP(mean): --  RR: 20 (09 Dec 2018 11:28) (18 - 20)  SpO2: 99% (09 Dec 2018 11:28) (94% - 100%)    Medications:  MEDICATIONS  (STANDING):  ALBUTerol/ipratropium for Nebulization 3 milliLiter(s) Nebulizer <User Schedule>  amLODIPine   Tablet 5 milliGRAM(s) Oral daily  artificial tears (preservative free) Ophthalmic Solution 1 Drop(s) Both EYES three times a day  aspirin enteric coated 81 milliGRAM(s) Oral daily  buDESOnide   0.5 milliGRAM(s) Respule 0.5 milliGRAM(s) Inhalation every 12 hours  cholecalciferol 2000 Unit(s) Oral daily  clarithromycin 500 milliGRAM(s) Oral two times a day  dextrose 5%. 1000 milliLiter(s) (50 mL/Hr) IV Continuous <Continuous>  dextrose 50% Injectable 12.5 Gram(s) IV Push once  dextrose 50% Injectable 25 Gram(s) IV Push once  dextrose 50% Injectable 25 Gram(s) IV Push once  docusate sodium 100 milliGRAM(s) Oral daily  enoxaparin Injectable 40 milliGRAM(s) SubCutaneous every 24 hours  insulin glargine Injectable (LANTUS) 24 Unit(s) SubCutaneous at bedtime  insulin lispro (HumaLOG) corrective regimen sliding scale   SubCutaneous three times a day before meals  insulin lispro (HumaLOG) corrective regimen sliding scale   SubCutaneous at bedtime  insulin lispro Injectable (HumaLOG) 12 Unit(s) SubCutaneous before dinner  insulin lispro Injectable (HumaLOG) 8 Unit(s) SubCutaneous before breakfast  insulin lispro Injectable (HumaLOG) 9 Unit(s) SubCutaneous before lunch  isosorbide   mononitrate ER Tablet (IMDUR) 30 milliGRAM(s) Oral daily  methylPREDNISolone sodium succinate Injectable 20 milliGRAM(s) IV Push every 6 hours  montelukast 10 milliGRAM(s) Oral daily  multivitamin 1 Tablet(s) Oral daily  nystatin    Suspension 483553 Unit(s) Oral four times a day  pantoprazole    Tablet 40 milliGRAM(s) Oral before breakfast  rosuvastatin 10 milliGRAM(s) Oral at bedtime  senna 2 Tablet(s) Oral at bedtime  theophylline ER Capsule 400 milliGRAM(s) Oral daily    MEDICATIONS  (PRN):  dextrose 40% Gel 15 Gram(s) Oral once PRN Blood Glucose LESS THAN 70 milliGRAM(s)/deciliter  glucagon  Injectable 1 milliGRAM(s) IntraMuscular once PRN Glucose LESS THAN 70 milligrams/deciliter  polyethylene glycol 3350 17 Gram(s) Oral once PRN Constipation  sodium chloride 0.65% Nasal 1 Spray(s) Both Nostrils two times a day PRN Nasal Congestion      Allergies    No Known Allergies    Intolerances        Physical Examination:  In no respiratory discomfort at rest.  Neck: no JVD, LAD, accessory muscle use. In no respiratory discomfort at rest.  PULM: Generalized decreased breath sounds bilaterally. Prolonged expiratory phase. Faint end expiratory wheezes on forced exhalation.  CVS: RR  Abdomen: nontender  Extremities: improving LE edema    LAB:  K+ 5.2  CO2 93  Last ABG on 3 liters O2 ---> 7.35/63/145 99%    Plan:  1, Continue Duoneb/Budesonide nebulizer tx, Singulair, Theophylline.  2. Continue Solumedrol 20 mg IVP Q6hrs.  3. On Biaxin.  4. Continue nocturnal BiPAP (12/5 with 40% O2). Importance of this intervention discussed with patient.  5. On SQ Lovenox.  6. On Protonix.  7. Check Theophylline level.  7. Endocrine and Renal Rx as noted. Would favor holding off on additional Diamox at this time.    POC: David Caban M.D.  [phone]

## 2018-12-10 ENCOUNTER — RX RENEWAL (OUTPATIENT)
Age: 58
End: 2018-12-10

## 2018-12-10 LAB
ANION GAP SERPL CALC-SCNC: 11 MMOL/L — SIGNIFICANT CHANGE UP (ref 5–17)
BASOPHILS # BLD AUTO: 0.01 K/UL — SIGNIFICANT CHANGE UP (ref 0–0.2)
BASOPHILS NFR BLD AUTO: 0.1 % — SIGNIFICANT CHANGE UP (ref 0–2)
BUN SERPL-MCNC: 42 MG/DL — HIGH (ref 7–23)
CALCIUM SERPL-MCNC: 9.1 MG/DL — SIGNIFICANT CHANGE UP (ref 8.4–10.5)
CHLORIDE SERPL-SCNC: 93 MMOL/L — LOW (ref 96–108)
CO2 SERPL-SCNC: 30 MMOL/L — SIGNIFICANT CHANGE UP (ref 22–31)
CREAT SERPL-MCNC: 1.17 MG/DL — SIGNIFICANT CHANGE UP (ref 0.5–1.3)
EOSINOPHIL # BLD AUTO: 0.01 K/UL — SIGNIFICANT CHANGE UP (ref 0–0.5)
EOSINOPHIL NFR BLD AUTO: 0.1 % — SIGNIFICANT CHANGE UP (ref 0–6)
GLUCOSE BLDC GLUCOMTR-MCNC: 116 MG/DL — HIGH (ref 70–99)
GLUCOSE BLDC GLUCOMTR-MCNC: 125 MG/DL — HIGH (ref 70–99)
GLUCOSE BLDC GLUCOMTR-MCNC: 204 MG/DL — HIGH (ref 70–99)
GLUCOSE BLDC GLUCOMTR-MCNC: 219 MG/DL — HIGH (ref 70–99)
GLUCOSE BLDC GLUCOMTR-MCNC: 87 MG/DL — SIGNIFICANT CHANGE UP (ref 70–99)
GLUCOSE SERPL-MCNC: 303 MG/DL — HIGH (ref 70–99)
HCT VFR BLD CALC: 39.5 % — SIGNIFICANT CHANGE UP (ref 34.5–45)
HGB BLD-MCNC: 12.8 G/DL — SIGNIFICANT CHANGE UP (ref 11.5–15.5)
IMM GRANULOCYTES NFR BLD AUTO: 1 % — SIGNIFICANT CHANGE UP (ref 0–1.5)
LYMPHOCYTES # BLD AUTO: 0.56 K/UL — LOW (ref 1–3.3)
LYMPHOCYTES # BLD AUTO: 3.3 % — LOW (ref 13–44)
MAGNESIUM SERPL-MCNC: 2.1 MG/DL — SIGNIFICANT CHANGE UP (ref 1.6–2.6)
MCHC RBC-ENTMCNC: 28.3 PG — SIGNIFICANT CHANGE UP (ref 27–34)
MCHC RBC-ENTMCNC: 32.4 GM/DL — SIGNIFICANT CHANGE UP (ref 32–36)
MCV RBC AUTO: 87.2 FL — SIGNIFICANT CHANGE UP (ref 80–100)
MONOCYTES # BLD AUTO: 0.47 K/UL — SIGNIFICANT CHANGE UP (ref 0–0.9)
MONOCYTES NFR BLD AUTO: 2.7 % — SIGNIFICANT CHANGE UP (ref 2–14)
NEUTROPHILS # BLD AUTO: 15.9 K/UL — HIGH (ref 1.8–7.4)
NEUTROPHILS NFR BLD AUTO: 92.8 % — HIGH (ref 43–77)
PHOSPHATE SERPL-MCNC: 3 MG/DL — SIGNIFICANT CHANGE UP (ref 2.5–4.5)
PLATELET # BLD AUTO: 236 K/UL — SIGNIFICANT CHANGE UP (ref 150–400)
POTASSIUM SERPL-MCNC: 4.6 MMOL/L — SIGNIFICANT CHANGE UP (ref 3.5–5.3)
POTASSIUM SERPL-SCNC: 4.6 MMOL/L — SIGNIFICANT CHANGE UP (ref 3.5–5.3)
RBC # BLD: 4.53 M/UL — SIGNIFICANT CHANGE UP (ref 3.8–5.2)
RBC # FLD: 13.3 % — SIGNIFICANT CHANGE UP (ref 10.3–14.5)
SODIUM SERPL-SCNC: 134 MMOL/L — LOW (ref 135–145)
WBC # BLD: 17.12 K/UL — HIGH (ref 3.8–10.5)
WBC # FLD AUTO: 17.12 K/UL — HIGH (ref 3.8–10.5)

## 2018-12-10 PROCEDURE — 99233 SBSQ HOSP IP/OBS HIGH 50: CPT

## 2018-12-10 PROCEDURE — 99232 SBSQ HOSP IP/OBS MODERATE 35: CPT

## 2018-12-10 PROCEDURE — 99232 SBSQ HOSP IP/OBS MODERATE 35: CPT | Mod: GC

## 2018-12-10 RX ORDER — INSULIN LISPRO 100/ML
12 VIAL (ML) SUBCUTANEOUS
Qty: 0 | Refills: 0 | Status: DISCONTINUED | OUTPATIENT
Start: 2018-12-10 | End: 2018-12-13

## 2018-12-10 RX ORDER — INSULIN GLARGINE 100 [IU]/ML
26 INJECTION, SOLUTION SUBCUTANEOUS AT BEDTIME
Qty: 0 | Refills: 0 | Status: DISCONTINUED | OUTPATIENT
Start: 2018-12-10 | End: 2018-12-12

## 2018-12-10 RX ORDER — INSULIN LISPRO 100/ML
14 VIAL (ML) SUBCUTANEOUS
Qty: 0 | Refills: 0 | Status: DISCONTINUED | OUTPATIENT
Start: 2018-12-10 | End: 2018-12-13

## 2018-12-10 RX ADMIN — Medication 1 TABLET(S): at 12:51

## 2018-12-10 RX ADMIN — ISOSORBIDE MONONITRATE 30 MILLIGRAM(S): 60 TABLET, EXTENDED RELEASE ORAL at 12:45

## 2018-12-10 RX ADMIN — INSULIN GLARGINE 26 UNIT(S): 100 INJECTION, SOLUTION SUBCUTANEOUS at 22:35

## 2018-12-10 RX ADMIN — Medication 500000 UNIT(S): at 23:00

## 2018-12-10 RX ADMIN — ROSUVASTATIN CALCIUM 10 MILLIGRAM(S): 5 TABLET ORAL at 22:36

## 2018-12-10 RX ADMIN — Medication 0.5 MILLIGRAM(S): at 18:34

## 2018-12-10 RX ADMIN — Medication 2000 UNIT(S): at 12:45

## 2018-12-10 RX ADMIN — Medication 400 MILLIGRAM(S): at 12:47

## 2018-12-10 RX ADMIN — Medication 9 UNIT(S): at 12:51

## 2018-12-10 RX ADMIN — Medication 14 UNIT(S): at 19:43

## 2018-12-10 RX ADMIN — MONTELUKAST 10 MILLIGRAM(S): 4 TABLET, CHEWABLE ORAL at 12:46

## 2018-12-10 RX ADMIN — Medication 4: at 12:52

## 2018-12-10 RX ADMIN — Medication 3 MILLILITER(S): at 22:36

## 2018-12-10 RX ADMIN — Medication 0.5 MILLIGRAM(S): at 06:25

## 2018-12-10 RX ADMIN — Medication 3 MILLILITER(S): at 18:34

## 2018-12-10 RX ADMIN — Medication 500 MILLIGRAM(S): at 06:23

## 2018-12-10 RX ADMIN — PANTOPRAZOLE SODIUM 40 MILLIGRAM(S): 20 TABLET, DELAYED RELEASE ORAL at 06:23

## 2018-12-10 RX ADMIN — Medication 500000 UNIT(S): at 18:33

## 2018-12-10 RX ADMIN — Medication 20 MILLIGRAM(S): at 23:00

## 2018-12-10 RX ADMIN — Medication 8 UNIT(S): at 08:57

## 2018-12-10 RX ADMIN — Medication 20 MILLIGRAM(S): at 18:39

## 2018-12-10 RX ADMIN — Medication 100 MILLIGRAM(S): at 12:46

## 2018-12-10 RX ADMIN — Medication 20 MILLIGRAM(S): at 06:24

## 2018-12-10 RX ADMIN — Medication 3 MILLILITER(S): at 14:55

## 2018-12-10 RX ADMIN — Medication 4: at 08:57

## 2018-12-10 RX ADMIN — Medication 1 DROP(S): at 06:23

## 2018-12-10 RX ADMIN — Medication 3 MILLILITER(S): at 09:56

## 2018-12-10 RX ADMIN — Medication 1 DROP(S): at 14:55

## 2018-12-10 RX ADMIN — Medication 500 MILLIGRAM(S): at 18:34

## 2018-12-10 RX ADMIN — Medication 20 MILLIGRAM(S): at 12:45

## 2018-12-10 RX ADMIN — ENOXAPARIN SODIUM 40 MILLIGRAM(S): 100 INJECTION SUBCUTANEOUS at 22:37

## 2018-12-10 RX ADMIN — AMLODIPINE BESYLATE 5 MILLIGRAM(S): 2.5 TABLET ORAL at 06:24

## 2018-12-10 RX ADMIN — SENNA PLUS 2 TABLET(S): 8.6 TABLET ORAL at 22:57

## 2018-12-10 RX ADMIN — Medication 81 MILLIGRAM(S): at 12:44

## 2018-12-10 RX ADMIN — Medication 500000 UNIT(S): at 12:47

## 2018-12-10 RX ADMIN — Medication 1 DROP(S): at 22:36

## 2018-12-10 RX ADMIN — Medication 3 MILLILITER(S): at 06:23

## 2018-12-10 RX ADMIN — Medication 500000 UNIT(S): at 06:25

## 2018-12-10 NOTE — PROGRESS NOTE ADULT - PROBLEM SELECTOR PLAN 1
Continue IV Solumedrol 20mg q6h.  CTA showed no PE, no PNA.  Continue Pulmicort, Duoneb ATC (with transition back to spiriva upon discharge), Theophylline, and Singulair.  Biaxin added by pulmonary.  Echo report noted, mild diastolic dysfunction, no significant changes from prior study in 2014.  Home Trilogy vent being arranged by pulmonary.  Stable with minimal improvement clinically; continue current management.

## 2018-12-10 NOTE — PROGRESS NOTE ADULT - ASSESSMENT
ASSESSMENT:    ongoing dyspnea with minimal exertion with chronic hypoxic and hypercapnic respiratory failure due to very severe COPD with "exacerbation" - adequate oxygenation on a nasal canula in the setting of profound dyspnea suggests that alterations in the mechanics of breathing due to lung hyperinflation are playing a significant role in shortness of breath - there is evidence of CAD without pulmonary hypertension on the CT scan which is without pulmonary emboli - would not use diamox to treat the patient's metabolic alkalosis which is compensatory for her chronic respiratory acidosis - she will unlikely be able to increase her minute ventilation to prevent worsening acidemia     HTN/HLD/DM    CAD s/p PCI    PAD    lower extremity swelling likely related to steroid induced fluid retention and nocturnal hypoxemia - no evidence of DVT on lower extremity Duplex examination    PLAN/RECOMMENDATIONS:    oxygen supplementation to keep saturation greater than 92%  sputum culture - no growth - complete a 5 day course of biaxin  home trilogy vent has been arranged with [facility] surgical supply - nocturnal BIPAP 12/5 with 40% FiO2  would consider low dose narcotics for dyspnea watching closely for sedation or worsening hypercapnia - would not start without consultation with Dr. Mathew  albuterol/atrovent nebs q4h holding 2AM dose  pulmicort 0.5mg nebs q12h  singulair/theophylline - check theophylline level  continue solumedrol to 20mg IVP q6h - glucose control on high dose steroids - nystatin  cardiac meds: ASA/crestor/imdur/norvasc  cardiology evaluation - ECHO with preserved LVEF, no evidence of valvular heart disease and no evidence of right ventricular dysfunction despite chronic hypoxemia  DVT prophylaxis - SQ lovenox  thigh high TOMAS stockings - change to medium  daily physical therapy - needs to walk multiple times a day as able  continue outpatient pulmonary rehabilitation  outpatient evaluation for lung transplantation ongoing    Will follow with you. Plan of care discussed with the patient at bedside and the medical team. She will take many months to return to close to her baseline respiratory status following this exacerbation.    David Fair MD, [facility] - [phone]  Pulmonary Medicine

## 2018-12-10 NOTE — PROGRESS NOTE ADULT - PROBLEM SELECTOR PLAN 4
Improved after diamox.  Thought to be due to secondary to Benicar and hyperglycemia.  Benicar has been discontinued.  Continue Norvasc 5 mg daily.  Glycemic control as above.  Pt with metabolic alk 2/2 chronic resp acidosis   s/p diamox and kayxelate 12/8   d/w renal appreciate recs

## 2018-12-10 NOTE — PROGRESS NOTE ADULT - PROBLEM SELECTOR PLAN 1
-test BG AC/HS  -Increase Lantus 27 units QHS  -change  Humalog 12-12-14 ac meals. Pt requiring total of 12 to 14 units of Humalog ac meals.   -c/w Humalog moderate correction scale AC and Mod HS scale  -Please notify endocrine when steroids further tapered. Will need adjustment to insulin regimen.  -Plan discussed with pt/team.  Contact info: [phone] (24/7). pager [phone] -test BG AC/HS  -Increase Lantus 26 units QHS  -change  Humalog 12-12-14 ac meals. Pt requiring total of 12 to 14 units of Humalog ac meals.   -c/w Humalog moderate correction scale AC and Mod HS scale  -Please notify endocrine when steroids further tapered. Will need adjustment to insulin regimen.  -Plan discussed with pt/team.  Contact info: [phone] (24/7). pager [phone]

## 2018-12-10 NOTE — PROGRESS NOTE ADULT - ASSESSMENT
59 y/o F w/h/o controlled T2DM on Metformin 500mg bid. Also h/o CAD and COPD. Here with COPD exacerbation on Methylprednisolone 20 mg q6h and rebound hyperglycemia.  Variable PO intake with variable BG levels but going up in the last 24 hours. Will increase basal/bolus insulin doses. BG goal (100-200mg/dl).

## 2018-12-10 NOTE — PROGRESS NOTE ADULT - SUBJECTIVE AND OBJECTIVE BOX
[facility] DIVISION OF KIDNEY DISEASES AND HYPERTENSION -- FOLLOW UP NOTE  --------------------------------------------------------------------------------  Chief Complaint:  hyperkalemia    24 hour events/subjective:  Pt says she has not had a BM in almost a week. Is passing gas today. Refusing laxatives however as she says her brother is visiting today and she will take th meds once he leaves.      PAST HISTORY  --------------------------------------------------------------------------------  No significant changes to PMH, PSH, FHx, SHx, unless otherwise noted    ALLERGIES & MEDICATIONS  --------------------------------------------------------------------------------  Allergies    No Known Allergies    Intolerances      Standing Inpatient Medications  ALBUTerol/ipratropium for Nebulization 3 milliLiter(s) Nebulizer <User Schedule>  amLODIPine   Tablet 5 milliGRAM(s) Oral daily  artificial tears (preservative free) Ophthalmic Solution 1 Drop(s) Both EYES three times a day  aspirin enteric coated 81 milliGRAM(s) Oral daily  buDESOnide   0.5 milliGRAM(s) Respule 0.5 milliGRAM(s) Inhalation every 12 hours  cholecalciferol 2000 Unit(s) Oral daily  clarithromycin 500 milliGRAM(s) Oral two times a day  dextrose 5%. 1000 milliLiter(s) IV Continuous <Continuous>  dextrose 50% Injectable 12.5 Gram(s) IV Push once  dextrose 50% Injectable 25 Gram(s) IV Push once  dextrose 50% Injectable 25 Gram(s) IV Push once  docusate sodium 100 milliGRAM(s) Oral daily  enoxaparin Injectable 40 milliGRAM(s) SubCutaneous every 24 hours  insulin glargine Injectable (LANTUS) 24 Unit(s) SubCutaneous at bedtime  insulin lispro (HumaLOG) corrective regimen sliding scale   SubCutaneous three times a day before meals  insulin lispro (HumaLOG) corrective regimen sliding scale   SubCutaneous at bedtime  insulin lispro Injectable (HumaLOG) 12 Unit(s) SubCutaneous before dinner  insulin lispro Injectable (HumaLOG) 8 Unit(s) SubCutaneous before breakfast  insulin lispro Injectable (HumaLOG) 9 Unit(s) SubCutaneous before lunch  isosorbide   mononitrate ER Tablet (IMDUR) 30 milliGRAM(s) Oral daily  methylPREDNISolone sodium succinate Injectable 20 milliGRAM(s) IV Push every 6 hours  montelukast 10 milliGRAM(s) Oral daily  multivitamin 1 Tablet(s) Oral daily  nystatin    Suspension 423926 Unit(s) Oral four times a day  pantoprazole    Tablet 40 milliGRAM(s) Oral before breakfast  rosuvastatin 10 milliGRAM(s) Oral at bedtime  senna 2 Tablet(s) Oral at bedtime  theophylline ER Capsule 400 milliGRAM(s) Oral daily    PRN Inpatient Medications  bisacodyl Suppository 10 milliGRAM(s) Rectal daily PRN  dextrose 40% Gel 15 Gram(s) Oral once PRN  glucagon  Injectable 1 milliGRAM(s) IntraMuscular once PRN  sodium chloride 0.65% Nasal 1 Spray(s) Both Nostrils two times a day PRN      REVIEW OF SYSTEMS  --------------------------------------------------------------------------------  Gen: + fatigue  Respiratory: + dyspnea  CV: No chest pain  GI: No abdominal pain, + constipation  MSK: + edema  Neuro: no confusion    VITALS/PHYSICAL EXAM  --------------------------------------------------------------------------------  T(C): 36.4 (12-10-18 @ 12:58), Max: 36.6 (12-09-18 @ 21:45)  HR: 100 (12-10-18 @ 12:58) (79 - 100)  BP: 112/74 (12-10-18 @ 12:58) (107/71 - 112/74)  RR: 20 (12-10-18 @ 12:58) (20 - 21)  SpO2: 100% (12-10-18 @ 12:58) (96% - 100%)  Wt(kg): --        12-09-18 @ 07:01  -  12-10-18 @ 07:00  --------------------------------------------------------  IN: 240 mL / OUT: 0 mL / NET: 240 mL    12-10-18 @ 07:01  -  12-10-18 @ 15:02  --------------------------------------------------------  IN: 800 mL / OUT: 0 mL / NET: 800 mL      Physical Exam:  	Gen: obese female, sitting in chair  	HEENT: + NC  	Pulm: decreased breath sounds at bases  	CV: RRR, S1S2; no rub  	Abd: +BS, soft, nontender/nondistended  	LE: Warm, b/l LE edema  	Neuro: follows commands  	Psych: upset  	Skin: Warm    LABS/STUDIES  --------------------------------------------------------------------------------              12.8   17.12 >-----------<  236      [12-10-18 @ 07:52]              39.5     134  |  93  |  42  ----------------------------<  303      [12-10-18 @ 06:21]  4.6   |  30  |  1.17        Ca     9.1     [12-10-18 @ 06:21]      Mg     2.1     [12-10-18 @ 06:21]      Phos  3.0     [12-10-18 @ 06:21]            Creatinine Trend:  SCr 1.17 [12-10 @ 06:21]  SCr 1.01 [12-09 @ 07:49]  SCr 1.09 [12-08 @ 22:29]  SCr 1.20 [12-08 @ 07:14]  SCr 1.08 [12-07 @ 07:12]        HbA1c 7.2      [11-30-18 @ 07:58]

## 2018-12-10 NOTE — PROGRESS NOTE ADULT - SUBJECTIVE AND OBJECTIVE BOX
Patient is a 58y old  Female who presents with a chief complaint of NIELSON (09 Dec 2018 12:23)    SUBJECTIVE / OVERNIGHT EVENTS:  Patient seen and examined by bedside. She states she feels the same as yesterday however she does have some improvement since admission (able to sit up in bed and chair and talking without getting SOB). She has good insight into her disease process but hoping that her symptoms improve. She has not had a bowel movement in 4 days. Her BG has been uncontrolled and states she only drinks diet snapple and diet orange soda.    MEDICATIONS  (STANDING):  ALBUTerol/ipratropium for Nebulization 3 milliLiter(s) Nebulizer <User Schedule>  amLODIPine   Tablet 5 milliGRAM(s) Oral daily  artificial tears (preservative free) Ophthalmic Solution 1 Drop(s) Both EYES three times a day  aspirin enteric coated 81 milliGRAM(s) Oral daily  buDESOnide   0.5 milliGRAM(s) Respule 0.5 milliGRAM(s) Inhalation every 12 hours  cholecalciferol 2000 Unit(s) Oral daily  clarithromycin 500 milliGRAM(s) Oral two times a day  dextrose 5%. 1000 milliLiter(s) (50 mL/Hr) IV Continuous <Continuous>  dextrose 50% Injectable 12.5 Gram(s) IV Push once  dextrose 50% Injectable 25 Gram(s) IV Push once  dextrose 50% Injectable 25 Gram(s) IV Push once  docusate sodium 100 milliGRAM(s) Oral daily  enoxaparin Injectable 40 milliGRAM(s) SubCutaneous every 24 hours  insulin glargine Injectable (LANTUS) 24 Unit(s) SubCutaneous at bedtime  insulin lispro (HumaLOG) corrective regimen sliding scale   SubCutaneous three times a day before meals  insulin lispro (HumaLOG) corrective regimen sliding scale   SubCutaneous at bedtime  insulin lispro Injectable (HumaLOG) 12 Unit(s) SubCutaneous before dinner  insulin lispro Injectable (HumaLOG) 8 Unit(s) SubCutaneous before breakfast  insulin lispro Injectable (HumaLOG) 9 Unit(s) SubCutaneous before lunch  isosorbide   mononitrate ER Tablet (IMDUR) 30 milliGRAM(s) Oral daily  methylPREDNISolone sodium succinate Injectable 20 milliGRAM(s) IV Push every 6 hours  montelukast 10 milliGRAM(s) Oral daily  multivitamin 1 Tablet(s) Oral daily  nystatin    Suspension 011234 Unit(s) Oral four times a day  pantoprazole    Tablet 40 milliGRAM(s) Oral before breakfast  rosuvastatin 10 milliGRAM(s) Oral at bedtime  senna 2 Tablet(s) Oral at bedtime  theophylline ER Capsule 400 milliGRAM(s) Oral daily    MEDICATIONS  (PRN):  dextrose 40% Gel 15 Gram(s) Oral once PRN Blood Glucose LESS THAN 70 milliGRAM(s)/deciliter  glucagon  Injectable 1 milliGRAM(s) IntraMuscular once PRN Glucose LESS THAN 70 milligrams/deciliter  polyethylene glycol 3350 17 Gram(s) Oral once PRN Constipation  sodium chloride 0.65% Nasal 1 Spray(s) Both Nostrils two times a day PRN Nasal Congestion    CAPILLARY BLOOD GLUCOSE  POCT Blood Glucose.: 200 mg/dL (09 Dec 2018 12:43)  POCT Blood Glucose.: 235 mg/dL (09 Dec 2018 08:32)  POCT Blood Glucose.: 232 mg/dL (09 Dec 2018 01:48)  POCT Blood Glucose.: 426 mg/dL (08 Dec 2018 21:01)  POCT Blood Glucose.: 425 mg/dL (08 Dec 2018 20:58)  POCT Blood Glucose.: 250 mg/dL (08 Dec 2018 17:28)    I&O's Summary    08 Dec 2018 07:01  -  09 Dec 2018 07:00  --------------------------------------------------------  IN: 480 mL / OUT: 0 mL / NET: 480 mL    09 Dec 2018 07:01  -  09 Dec 2018 13:26  --------------------------------------------------------  IN: 240 mL / OUT: 0 mL / NET: 240 mL    PHYSICAL EXAM:  GENERAL: minimal distress when talking, on oxygen 3L saturating 100%; well-developed  HEAD:  Atraumatic, Normocephalic  EYES: conjunctiva and sclera clear  CHEST/LUNG: decreased breath sounds b/l No wheeze  HEART: Regular rate and rhythm; S1S2  ABDOMEN: Soft, Nontender, Nondistended; Bowel sounds present  EXTREMITIES:  2+ Peripheral Pulses, No clubbing, cyanosis, or edema  PSYCH: AAOx3  NEUROLOGY: non-focal  SKIN: No rashes or lesions    LABS:                        13.6   16.16 )-----------( 252      ( 09 Dec 2018 09:09 )             40.8     12-09  134<L>  |  93<L>  |  37<H>  ----------------------------<  250<H>  5.2   |  32<H>  |  1.01    Ca    9.2      09 Dec 2018 07:49  Phos  4.2     12-09  Mg     2.2     12-09    RADIOLOGY & ADDITIONAL TESTS:  Imaging Personally Reviewed:  Consultant(s) Notes Reviewed:  renal   Care Discussed with Consultants/Other Providers: renal

## 2018-12-10 NOTE — PROGRESS NOTE ADULT - SUBJECTIVE AND OBJECTIVE BOX
Diabetes Follow up note: Saw pt earlier today  Interval Hx: 59 y/o F w/h/o controlled T2DM on Metformin 500mg bid. Also h/o CAD and COPD. Here with COPD exacerbation on Methylprednisolone> remains on steroid 20mg q6h. Per primary team, pt will remain in this dose. Reports tolerating POs with erratic PO intake since pt dislikes hospital food and eats at different hours during the day . Reports not eating at night with FBG >200s Also noted meal time BG levels going up as well.  Pt respiratory condition unchange> on bipap at night. No hypoglycemia.      Review of Systems:  General: as above  CV: No CP  GI: Tolerating POs without any N/V/D/ABD PAIN.  Resp: NIELSON.   ENDO: No S&Sx of hypoglycemia      MEDS:  insulin glargine Injectable (LANTUS) 24 Unit(s) SubCutaneous at bedtime  insulin lispro (HumaLOG) corrective regimen sliding scale   SubCutaneous three times a day before meals  insulin lispro (HumaLOG) corrective regimen sliding scale   SubCutaneous at bedtime  insulin lispro Injectable (HumaLOG) 12 Unit(s) SubCutaneous before dinner  insulin lispro Injectable (HumaLOG) 8 Unit(s) SubCutaneous before breakfast  insulin lispro Injectable (HumaLOG) 9Unit(s) SubCutaneous before lunch  methylPREDNISolone sodium succinate Injectable 20 milliGRAM(s) IV Push every 6 hours  rosuvastatin 10 milliGRAM(s) Oral at bedtime  clarithromycin 500 milliGRAM(s) Oral two times a day  theophylline ER Capsule 400 milliGRAM(s) Oral daily    Allergies    No Known Allergies      PE:  General: Female lying in bed. On O2.   Vital Signs Last 24 Hrs  T(C): 36.4 (12-10-18 @ 12:58), Max: 36.6 (12-09-18 @ 21:45)  T(F): 97.6 (12-10-18 @ 12:58), Max: 97.9 (12-09-18 @ 21:45)  HR: 100 (12-10-18 @ 12:58) (79 - 100)  BP: 112/74 (12-10-18 @ 12:58) (107/71 - 112/74)  BP(mean): --  RR: 20 (12-10-18 @ 12:58) (20 - 21)  SpO2: 100% (12-10-18 @ 12:58) (96% - 100%)  Abd: Soft, NT, ND, Obese.   Extremities: Warm. B/L trace edema. TOMAS stockings in place.   Neuro: A&O X3    LABS:  POCT Blood Glucose.: 219 mg/dL (12-10-18 @ 12:51)  POCT Blood Glucose.: 204 mg/dL (12-10-18 @ 08:52)  POCT Blood Glucose.: 307 mg/dL (12-09-18 @ 22:50)  POCT Blood Glucose.: 185 mg/dL (12-09-18 @ 16:52)  POCT Blood Glucose.: 200 mg/dL (12-09-18 @ 12:43)  POCT Blood Glucose.: 235 mg/dL (12-09-18 @ 08:32)  POCT Blood Glucose.: 232 mg/dL (12-09-18 @ 01:48)  POCT Blood Glucose.: 426 mg/dL (12-08-18 @ 21:01)  POCT Blood Glucose.: 425 mg/dL (12-08-18 @ 20:58)  POCT Blood Glucose.: 250 mg/dL (12-08-18 @ 17:28)                            12.8   17.12 )-----------( 236      ( 10 Dec 2018 07:52 )             39.5     12-10    134<L>  |  93<L>  |  42<H>  ----------------------------<  303<H>  4.6   |  30  |  1.17    Ca    9.1      10 Dec 2018 06:21  Phos  3.0     12-10  Mg     2.1     12-10      Hemoglobin A1C, Whole Blood: 7.2 % <H> [4.0 - 5.6] (11-30-18 @ 07:58)

## 2018-12-10 NOTE — PROGRESS NOTE ADULT - SUBJECTIVE AND OBJECTIVE BOX
Vascular Cardiology  Progress note     SPECTRA [id number]              EMAIL [email or web address]   OFFICE [phone]    CC:    LE edema, NIELSON     Complains of dyspnea.  LE edema is improved.  Remains on corticosteroids.  Sititng in a chair.  compression stockings on     Allergies    No Known Allergies    Intolerances     MEDICATIONS  (STANDING):  ALBUTerol/ipratropium for Nebulization 3 milliLiter(s) Nebulizer <User Schedule>  amLODIPine   Tablet 5 milliGRAM(s) Oral daily  artificial tears (preservative free) Ophthalmic Solution 1 Drop(s) Both EYES three times a day  aspirin enteric coated 81 milliGRAM(s) Oral daily  buDESOnide   0.5 milliGRAM(s) Respule 0.5 milliGRAM(s) Inhalation every 12 hours  cholecalciferol 2000 Unit(s) Oral daily  clarithromycin 500 milliGRAM(s) Oral two times a day  dextrose 5%. 1000 milliLiter(s) (50 mL/Hr) IV Continuous <Continuous>  dextrose 50% Injectable 12.5 Gram(s) IV Push once  dextrose 50% Injectable 25 Gram(s) IV Push once  dextrose 50% Injectable 25 Gram(s) IV Push once  docusate sodium 100 milliGRAM(s) Oral daily  enoxaparin Injectable 40 milliGRAM(s) SubCutaneous every 24 hours  insulin glargine Injectable (LANTUS) 24 Unit(s) SubCutaneous at bedtime  insulin lispro (HumaLOG) corrective regimen sliding scale   SubCutaneous three times a day before meals  insulin lispro (HumaLOG) corrective regimen sliding scale   SubCutaneous at bedtime  insulin lispro Injectable (HumaLOG) 12 Unit(s) SubCutaneous before dinner  insulin lispro Injectable (HumaLOG) 8 Unit(s) SubCutaneous before breakfast  insulin lispro Injectable (HumaLOG) 9 Unit(s) SubCutaneous before lunch  isosorbide   mononitrate ER Tablet (IMDUR) 30 milliGRAM(s) Oral daily  methylPREDNISolone sodium succinate Injectable 20 milliGRAM(s) IV Push every 6 hours  montelukast 10 milliGRAM(s) Oral daily  multivitamin 1 Tablet(s) Oral daily  nystatin    Suspension [id number] Unit(s) Oral four times a day  pantoprazole    Tablet 40 milliGRAM(s) Oral before breakfast  rosuvastatin 10 milliGRAM(s) Oral at bedtime  senna 2 Tablet(s) Oral at bedtime  theophylline ER Capsule 400 milliGRAM(s) Oral daily    MEDICATIONS  (PRN):  bisacodyl Suppository 10 milliGRAM(s) Rectal daily PRN Constipation  dextrose 40% Gel 15 Gram(s) Oral once PRN Blood Glucose LESS THAN 70 milliGRAM(s)/deciliter  glucagon  Injectable 1 milliGRAM(s) IntraMuscular once PRN Glucose LESS THAN 70 milligrams/deciliter  sodium chloride 0.65% Nasal 1 Spray(s) Both Nostrils two times a day PRN Nasal Congestion      PAST MEDICAL & SURGICAL HISTORY:  Hyperlipemia  Chronic sinusitis  Raynaud phenomenon  HTN (hypertension)  DM (diabetes mellitus)  Claustrophobia  COPD (chronic obstructive pulmonary disease)  S/P tonsillectomy      FAMILY HISTORY:  FH: MI (myocardial infarction) (Father)  FH: CABG (coronary artery bypass surgery) (Father)      SOCIAL HISTORY:  unchanged    REVIEW OF SYSTEMS:  CONSTITUTIONAL: No fever, weight loss, or fatigue  EYES: No eye pain, visual disturbances, or discharge  ENMT:  No difficulty hearing, tinnitus, vertigo; No sinus or throat pain  NECK: No pain or stiffness  RESPIRATORY: No cough, wheezing, chills or hemoptysis; +Shortness of Breath    CARDIOVASCULAR: No chest pain, palpitations, passing out, dizziness,.+ leg swelling    GASTROINTESTINAL: No abdominal or epigastric pain. No nausea, vomiting, or hematemesis; No diarrhea or constipation. No melena or hematochezia.  GENITOURINARY: No dysuria, frequency, hematuria, or incontinence  NEUROLOGICAL: No headaches, memory loss, loss of strength, numbness, or tremors  SKIN: No itching, burning, rashes, or lesions   LYMPH Nodes: No enlarged glands  ENDOCRINE: No heat or cold intolerance; No hair loss  MUSCULOSKELETAL: No joint pain or swelling; No muscle, back, or extremity pain  PSYCHIATRIC: No depression, anxiety, mood swings, or difficulty sleeping  HEME/LYMPH: No easy bruising, or bleeding gums  ALLERY AND IMMUNOLOGIC: No hives or eczema	    [ x] All others negative	  [ ] Unable to obtain    PHYSICAL EXAM:  ICU Vital Signs Last 24 Hrs  T(C): 36.3 (10 Dec 2018 06:21), Max: 36.6 (09 Dec 2018 21:45)  T(F): 97.4 (10 Dec 2018 06:21), Max: 97.9 (09 Dec 2018 21:45)  HR: 100 (10 Dec 2018 10:30) (79 - 100)  BP: 107/71 (10 Dec 2018 06:21) (107/71 - 112/69)  BP(mean): --  ABP: --  ABP(mean): --  RR: 20 (10 Dec 2018 06:21) (20 - 21)  SpO2: 99% (10 Dec 2018 10:30) (96% - 99%)        Appearance: Normal	  HEENT:   Normal oral mucosa, PERRL, EOMI	  Carotid:   Right:  No Bruit      Left:  No bruit  Cardiovascular: Normal S1 S2, No JVD, No murmurs  Respiratory: Mild expiratory wheezes bilaterally  Psychiatry: A & O x 3, Mood & affect appropriate  Gastrointestinal:  Soft, Non-tender, + BS	  Skin: No rashes, No ecchymoses, No cyanosis	  Neurologic: Non-focal  Extremities: Normal range of motion. 1+ BLE edema up to thighs  Foot Exam:  Warm, no ulceration.                             12.8   17.12 )-----------( 236      ( 10 Dec 2018 07:52 )             39.5   12-10    134<L>  |  93<L>  |  42<H>  ----------------------------<  303<H>  4.6   |  30  |  1.17    Ca    9.1      10 Dec 2018 06:21  Phos  3.0     12-10  Mg     2.1     12-10          Assessment:  1. Bilateral LE edema- no DVTs on LE doppler, could be 2/2 to prolonged steroid use   2. COPD exacerbation  3. CAD   4. HTN  5. HLD        Plan     1.  She has no stigmata of venous insufficiency or varicose veins, no hyperpigmentation  2.  Echo likely not contributing to edema  3.  Continue with compression stockings, leg elevation  4.  Hopefully once she is more mobile and on reduced steroids, edema will improve.    No further vascular testing or intervention at this point    Felipe [id number]

## 2018-12-10 NOTE — PROGRESS NOTE ADULT - PROBLEM SELECTOR PLAN 1
in setting of ARB use and hyperglycemia, most likely due to low elida/renin state. Now off Benicar, and switched to Norvasc. K now wnl.   - Can give lasix 20mg IV daily as needed for edema (will help with Na as well).   - Control blood glucose.   - Low K diet.  - Monitor K daily.  - Avoid ACEi/ARB therapy.  Will sign off. Please reconsult as needed.

## 2018-12-10 NOTE — PROGRESS NOTE ADULT - SUBJECTIVE AND OBJECTIVE BOX
[facility] - [facility]     PROGRESS NOTE    CHIEF COMPLAINT: chronic hypoxic/hypercapnic respiratory failure; COPD exacerbation; emphysema; dyspnea    INTERVAL HISTORY: frustrated about prolonged hospitalization and lack of clinical improvement; wore BIPAP overnight but was unable to sleep; no shortness of breath at rest or with talking on 4lpm nasal canula; still becoming quite short of breath with minimal exertion; no cough, sputum production, chest congestion or wheeze; no fevers, chills or sweats; no chest pain/pressure or palpitations; hoarseness improved; hyperglycemia; CTA without PE; improved leg swelling with thigh high TOMAS stockings; given diamox over the weekend; sputum cultures without growth x 2;    REVIEW OF SYSTEMS:  Constitutional: As per interval history  HEENT: Within normal limits  CV: As per interval history  Resp: As per interval history  GI: Within normal limits   : Within normal limits  Musculoskeletal: pain/numbness lower extremities with ambulation  Skin: Within normal limits  Neurological: Within normal limits  Psychiatric: Within normal limits  Endocrine: hyperglycemia  Hematologic/Lymphatic: Within normal limits  Allergic/Immunologic: Within normal limits      MEDICATIONS:     Pulmonary "  ALBUTerol/ipratropium for Nebulization 3 milliLiter(s) Nebulizer <User Schedule>  buDESOnide   0.5 milliGRAM(s) Respule 0.5 milliGRAM(s) Inhalation every 12 hours  montelukast 10 milliGRAM(s) Oral daily  theophylline ER Capsule 400 milliGRAM(s) Oral daily      Anti-microbials:  clarithromycin 500 milliGRAM(s) Oral two times a day  nystatin    Suspension [id number] Unit(s) Oral four times a day      Cardiovascular:  amLODIPine   Tablet 5 milliGRAM(s) Oral daily  isosorbide   mononitrate ER Tablet (IMDUR) 30 milliGRAM(s) Oral daily      Other:  artificial tears (preservative free) Ophthalmic Solution 1 Drop(s) Both EYES three times a day  aspirin enteric coated 81 milliGRAM(s) Oral daily  bisacodyl Suppository 10 milliGRAM(s) Rectal daily PRN  cholecalciferol 2000 Unit(s) Oral daily  dextrose 40% Gel 15 Gram(s) Oral once PRN  dextrose 5%. 1000 milliLiter(s) IV Continuous <Continuous>  dextrose 50% Injectable 12.5 Gram(s) IV Push once  dextrose 50% Injectable 25 Gram(s) IV Push once  dextrose 50% Injectable 25 Gram(s) IV Push once  docusate sodium 100 milliGRAM(s) Oral daily  enoxaparin Injectable 40 milliGRAM(s) SubCutaneous every 24 hours  glucagon  Injectable 1 milliGRAM(s) IntraMuscular once PRN  insulin glargine Injectable (LANTUS) 24 Unit(s) SubCutaneous at bedtime  insulin lispro (HumaLOG) corrective regimen sliding scale   SubCutaneous three times a day before meals  insulin lispro (HumaLOG) corrective regimen sliding scale   SubCutaneous at bedtime  insulin lispro Injectable (HumaLOG) 12 Unit(s) SubCutaneous before dinner  insulin lispro Injectable (HumaLOG) 8 Unit(s) SubCutaneous before breakfast  insulin lispro Injectable (HumaLOG) 9 Unit(s) SubCutaneous before lunch  methylPREDNISolone sodium succinate Injectable 20 milliGRAM(s) IV Push every 6 hours  multivitamin 1 Tablet(s) Oral daily  pantoprazole    Tablet 40 milliGRAM(s) Oral before breakfast  rosuvastatin 10 milliGRAM(s) Oral at bedtime  senna 2 Tablet(s) Oral at bedtime  sodium chloride 0.65% Nasal 1 Spray(s) Both Nostrils two times a day PRN        OBJECTIVE:    I&O's Detail    09 Dec 2018 07:01  -  10 Dec 2018 07:00  --------------------------------------------------------  IN:    Oral Fluid: 240 mL  Total IN: 240 mL    OUT:  Total OUT: 0 mL    Total NET: 240 mL      10 Dec 2018 07:01  -  10 Dec 2018 12:33  --------------------------------------------------------  IN:    Oral Fluid: 360 mL  Total IN: 360 mL    OUT:  Total OUT: 0 mL    Total NET: 360 mL    POCT Blood Glucose.: 204 mg/dL (10 Dec 2018 08:52)  POCT Blood Glucose.: 307 mg/dL (09 Dec 2018 22:50)  POCT Blood Glucose.: 185 mg/dL (09 Dec 2018 16:52)  POCT Blood Glucose.: 200 mg/dL (09 Dec 2018 12:43)      PHYSICAL EXAM:       ICU Vital Signs Last 24 Hrs  T(C): 36.3 (10 Dec 2018 06:21), Max: 36.6 (09 Dec 2018 21:45)  T(F): 97.4 (10 Dec 2018 06:21), Max: 97.9 (09 Dec 2018 21:45)  HR: 100 (10 Dec 2018 10:30) (79 - 100)  BP: 107/71 (10 Dec 2018 06:21) (107/71 - 112/69)  BP(mean): --  ABP: --  ABP(mean): --  RR: 20 (10 Dec 2018 06:21) (20 - 21)  SpO2: 99% (10 Dec 2018 10:30) (96% - 99%) on 4lpm nasal canula     General: Awake. Alert. Cooperative. No distress. Appears stated age. Obese 	  HEENT:  Atraumatic. Normocephalic. Anicteric. Normal oral mucosa. PERRL. EOMI.  Neck: Supple. Trachea midline. Thyroid without enlargement/tenderness/nodules. No carotid bruit. No JVD.	  Cardiovascular: Regular rate and rhythm. Distant S1 S2. No murmurs, rubs or gallops.  Respiratory: Respirations unlabored. Markedly decreased breath sounds throughout. Prolonged expiratory phase of respiration. No wheeze. No curvature.  Abdomen: Soft. Non-tender. Non-distended. No organomegaly. No masses. Normal bowel sounds. Obese.  Extremities: Warm to touch. No clubbing or cyanosis. Mild bilateral lower extremity edema up to the thigh with TOMAS stockings in place  Pulses: Decreased lower extremity peripheral pulses.  Skin: No rashes or lesions. No ecchymoses. No cyanosis. Warm to touch.  Lymph Nodes: Cervical, supraclavicular and axillary nodes normal  Neurological: Motor and sensory examination equal and normal. A and O x 3  Psychiatry: Appropriate mood and affect.      LABS:                        12.8   17.12 )-----------( 236      ( 10 Dec 2018 07:52 )             39.5                         13.6   16.16 )-----------( 252      ( 09 Dec 2018 09:09 )             40.8     12-10    134<L>  |  93<L>  |  42<H>  ----------------------------<  303<H>  4.6   |  30  |  1.17    12-09    134<L>  |  93<L>  |  37<H>  ----------------------------<  250<H>  5.2   |  32<H>  |  1.01    Ca      9.1      12-10    Ca      9.2      12-09    Phos    3.0     12-10    Phos    4.2     12-09      Mg       2.1     12-10    Mg       2.2     12-09    ABG - ( 09 Dec 2018 11:26 )  pH: 7.35  /  pCO2: 63    /  pO2: 145   / HCO3: 34    / Base Excess: 6.6   /  SaO2: 99        ABG - ( 09 Dec 2018 00:28 )  pH: 7.32  /  pCO2: 71    /  pO2: 124   / HCO3: 36    / Base Excess: 7.6   /  SaO2: 99        ABG - ( 08 Dec 2018 20:50 )  pH: 7.32  /  pCO2: 72    /  pO2: 80    / HCO3: 36    / Base Excess: 7.7   /  SaO2: 95        < from: Transthoracic Echocardiogram (12.07.18 @ 08:59) >    Patient name: GUY HARTMAN  YOB: 1960   Age: 58 (F)   MR#: [id number]  Study Date: 12/7/2018  Location: [address] [place], [place] [id number]: Laquita Armando [facility]  Study quality: Technically good  Referring Physician: Allen ingram MD  BloodPressure: 120/80 mmHg  Height: 163 cm  Weight: 88 kg  BSA: 1.9 m2  ------------------------------------------------------------------------  PROCEDURE: Transthoracic echocardiogram with 2-D, M-Mode  and complete spectral and color flow Doppler.  INDICATION: Other forms of dyspnea (R06.09)  ------------------------------------------------------------------------  Dimensions:    Normal Values:  LA:     3.6    2.0 - 4.0 cm  Ao:     2.9    2.0 - 3.8 cm  SEPTUM: 0.9    0.6 - 1.2 cm  PWT:    0.8    0.6- 1.1 cm  LVIDd:  4.9    3.0 - 5.6 cm  LVIDs:  2.9    1.8 - 4.0 cm  Derived variables:  LVMI: 73 g/m2  RWT: 0.32  Fractional short: 40 %  EF (Teicholtz): 71 %  Doppler Peak Velocity (m/sec): AoV=1.2  ------------------------------------------------------------------------  Observations:  Mitral Valve: Normal mitral valve.  Aortic Valve/Aorta: Normal trileaflet aortic valve. Peak  transaortic valve gradient equals 6 mm Hg, mean transaortic  valve gradient equals 3 mm Hg, aortic valve velocity time  integral equals 22 cm. Trace aortic regurgitation.  Aortic Root: 2.9 cm.  Left Atrium: Normal left atrium.  LA volume index = 18  cc/m2.  Left Ventricle: Normal left ventricular systolic function.  No segmental wall motion abnormalities. Normal left  ventricular internal dimensions and wall thicknesses. Mild  diastolic dysfunction (Stage I).  Right Heart: Normal right atrium. Normal right ventricular  size and function. Normal tricuspid valve. Minimal  tricuspid regurgitation. Normal pulmonic valve.  Pericardium/Pleura: Normal pericardium with no pericardial  effusion.  Hemodynamic: Estimated right atrial pressure is 8 mm Hg.  Inadequate tricuspid regurgitation Doppler envelope  precludes estimation of RVSP.  ------------------------------------------------------------------------  Conclusions:  1. Normal mitral valve.  2. Normal trileaflet aortic valve. Peak transaortic valve  gradient equals 6 mm Hg, mean transaortic valve gradient  equals 3 mm Hg, aortic valve velocity time integral equals  22 cm. Trace aortic regurgitation.  3. Aortic Root: 2.9 cm.  4. Normal left atrium.  LA volume index = 18 cc/m2.  5. Normal left ventricular internal dimensions and wall  thicknesses.  6. Normal left ventricular systolic function. No segmental  wall motion abnormalities.  7. Mild diastolic dysfunction (Stage I).  8. Normal right ventricular size and function.  9. Inadequate tricuspid regurgitation Doppler envelope  precludes estimation of RVSP.  10. Normal tricuspid valve. Minimal tricuspid  regurgitation.  *** Compared with echocardiogram report of 2/18/2014, no  significant changes noted.  ------------------------------------------------------------------------  Confirmed on  12/7/2018 - 13:49:43 by Shefali Gómez M.D.  ------------------------------------------------------------------------    < end of copied text >  ---------------------------------------------------------------------------------------------------------------  MICROBIOLOGY:     Culture - Sputum . (12.07.18 @ 08:34)    Gram Stain:   Rare polymorphonuclear leukocytes per low power field  Rare Squamous epithelial cells per low power field  Numerous Gram Positive Cocci in Pairs and Chains per oil power field    Specimen Source: .Sputum Sputum    Culture Results:   Normal Respiratory Samaria present    Culture - Sputum . (12.05.18 @ 22:50)    Gram Stain:   Moderate polymorphonuclear leukocytes per low power field  Few Squamous epithelial cells per low power field  Moderate Gram Positive Cocci in Pairs and Chains per oil power field    Specimen Source: .Sputum Sputum    Culture Results:   Normal Respiratory Samaria present    Rapid Respiratory Viral Panel (11.30.18 @ 01:30)    Rapid RVP Result: NotDete: The [employer] RVP Rapid uses polymerase chain reaction (PCR) and melt  curve analysis to screen for adenovirus; coronavirus HKU1, NL63, 229E,  OC43; human metapneumovirus (hMPV); human enterovirus/rhinovirus  (Entero/RV); influenza A; influenza A/H1;influenza A/H3; influenza  A/H1-2009; influenza B; parainfluenza viruses 1, 2, 3, 4; respiratory  syncytial virus; Bordetella pertussis; Mycoplasma pneumoniae; and  Chlamydophila pneumoniae.    RADIOLOGY:  [x] Chest radiographs reviewed and interpreted by me    < from: CT Angio Chest w/ IV Cont (12.04.18 @ 16:30) >    EXAM:  CT ANGIO CHEST (W)AW IC                          PROCEDURE DATE:  12/04/2018      INTERPRETATION:  CT CHEST WITH CONTRAST    INDICATION: Known COPD not responding to steroids and bronchodilators.   Progressively worsening dyspnea. Evaluate for pulmonary embolism.    TECHNIQUE: Enhanced helical images were obtained of the chest. Coronal   and sagittal images were reconstructed. Maximum intensity projection   images were generated. Images were obtained after the uneventful   administration of 60 cc nonionic intravenous Omnipaque 350.  40 cc of   Omnipaque 350 was discarded.    COMPARISON: CT chest 2/18/2014.    FINDINGS:     Lungs And Airways: Emphysematous changes of the lung.      The airways are unremarkable.      Pleura: No pneumothorax. No pleural effusion.    Mediastinum: There are no enlarged chest lymph nodes. The visualized   portion of the thyroid gland is unremarkable.       Heart and Vasculature: No pulmonary embolism.    The main pulmonary artery is normal in caliber. No thoracic aortic   aneurysm or dissection. Atheromatous disease of the aorta.    The heart is normal in size.  There is no pericardial effusion.   Coronary artery disease with calcified plaque involving the right and   left main coronary arteries and the left anterior descending artery.      Upper Abdomen: The upper abdomen is unremarkable.    Bones And Soft Tissues: Degenerative changes of the spine.  The soft   tissues are unremarkable.      IMPRESSION:   1.  No pulmonary embolism.  2. Emphysematous changes of the lung.    DIANA MEDINA M.D., RADIOLOGY RESIDENT  This document has been electronically signed.  JEYSON SANCHEZ M.D., ATTENDING RADIOLOGIST  This document has been electronically signed. Dec  4 2018  5:21PM      < end of copied text >  ---------------------------------------------------------------------------------------------------------------  < from: Xray Chest 1 View AP/PA (11.29.18 @ 14:53) >    EXAM:  XR CHEST AP OR PA 1V                          PROCEDURE DATE:  11/29/2018      INTERPRETATION:  A single chest x-ray was obtained on November 29, 2018.    Indication: Shortness of breath.    Impression:    The heart is normal in size. The lungs are clear. The visualized bones   are normal.    ALEX CUELLAR M.D., ATTENDING RADIOLOGIST  This document has been electronically signed. Nov 29 2018  2:59PM    < end of copied text >  ---------------------------------------------------------------------------------------------------------------  < from: VA Duplex Lower Ext Vein Scan, Darius (12.06.18 @ 15:40) >    EXAM:  DUPLEX SCAN EXT VEINS LOWER BI                          PROCEDURE DATE:  12/06/2018      INTERPRETATION:  CLINICAL INFORMATION: Shortness of breath, leg pain and   swelling.    COMPARISON: Bilateral lower extremity venous duplex study dated 1/27/2009.    TECHNIQUE: Duplex sonography of the BILATERAL LOWER extremities with   color and spectral Doppler, with and without compression.      FINDINGS:    There is normal compressibility of the bilateral common femoral, femoral   and popliteal veins. No calf vein thrombosis is detected.    Doppler examination shows normal spontaneous and phasic flow.    IMPRESSION:     No evidence of bilateral lower extremity deep venous thrombosis.    JUSTIN ZAVALETA M.D., ATTENDING RADIOLOGIST  This document has been electronically signed. Dec  6 2018  4:03PM    < end of copied text >  ---------------------------------------------------------------------------------------------------------------      SPIROMETRY:     FEV1 0.56 liters - 22% predicted  FVC 1.73 liters - 52% predicted  FEV1% 32    c/w very severe obstructive lung disease

## 2018-12-10 NOTE — PROGRESS NOTE ADULT - PROBLEM SELECTOR PLAN 3
A1C 7.2, but with hyperglycemia in setting of Solumedrol use.  Currently on Lantus 24 units with pre-meal Humalog 12-8-9 per endocrine with BG still uncontrolled; endocrine on board and will titrate up insulin regimen.  Continue to monitor blood sugars. A1C 7.2, but with hyperglycemia in setting of Solumedrol use.  Currently on Lantus 24 units with pre-meal Humalog 12-8-9 per endocrine with BG still uncontrolled; endocrine on board and will titrate up insulin regimen. d/w endocrine today   Continue to monitor blood sugars.

## 2018-12-10 NOTE — PROGRESS NOTE ADULT - PROBLEM SELECTOR PLAN 5
Has BM every other day at home at baseline.  No BM in 4 days despite being on senna, colace, miralax.  Will give suppositories. Has BM every other day at home at baseline.  No BM in 4 days despite being on senna, colace, miralax.  Will give dulcolax suppository

## 2018-12-10 NOTE — PROGRESS NOTE ADULT - ASSESSMENT
58yoF w/ advanced COPD on home O2 3L (being evaluated at [facility] for lung transplant), HTN, HLD, CAD s/p 6 stents, DMII on metformin, PVD, who p/w progressive worsening dyspnea x 3 weeks c/w COPD exacerbation. Renal now called for persistent hyperkalemia.

## 2018-12-11 DIAGNOSIS — D72.829 ELEVATED WHITE BLOOD CELL COUNT, UNSPECIFIED: ICD-10-CM

## 2018-12-11 LAB
ANION GAP SERPL CALC-SCNC: 10 MMOL/L — SIGNIFICANT CHANGE UP (ref 5–17)
BASE EXCESS BLDA CALC-SCNC: 6.2 MMOL/L — HIGH (ref -2–2)
BUN SERPL-MCNC: 46 MG/DL — HIGH (ref 7–23)
CALCIUM SERPL-MCNC: 9.4 MG/DL — SIGNIFICANT CHANGE UP (ref 8.4–10.5)
CHLORIDE SERPL-SCNC: 92 MMOL/L — LOW (ref 96–108)
CO2 BLDA-SCNC: 34 MMOL/L — HIGH (ref 22–30)
CO2 SERPL-SCNC: 34 MMOL/L — HIGH (ref 22–31)
CREAT SERPL-MCNC: 1.22 MG/DL — SIGNIFICANT CHANGE UP (ref 0.5–1.3)
GLUCOSE BLDC GLUCOMTR-MCNC: 134 MG/DL — HIGH (ref 70–99)
GLUCOSE BLDC GLUCOMTR-MCNC: 136 MG/DL — HIGH (ref 70–99)
GLUCOSE BLDC GLUCOMTR-MCNC: 154 MG/DL — HIGH (ref 70–99)
GLUCOSE BLDC GLUCOMTR-MCNC: 94 MG/DL — SIGNIFICANT CHANGE UP (ref 70–99)
GLUCOSE SERPL-MCNC: 133 MG/DL — HIGH (ref 70–99)
HCO3 BLDA-SCNC: 32 MMOL/L — HIGH (ref 21–29)
HCT VFR BLD CALC: 44.1 % — SIGNIFICANT CHANGE UP (ref 34.5–45)
HGB BLD-MCNC: 14.5 G/DL — SIGNIFICANT CHANGE UP (ref 11.5–15.5)
HOROWITZ INDEX BLDA+IHG-RTO: 40 — SIGNIFICANT CHANGE UP
MAGNESIUM SERPL-MCNC: 2.4 MG/DL — SIGNIFICANT CHANGE UP (ref 1.6–2.6)
MCHC RBC-ENTMCNC: 29.1 PG — SIGNIFICANT CHANGE UP (ref 27–34)
MCHC RBC-ENTMCNC: 33 GM/DL — SIGNIFICANT CHANGE UP (ref 32–36)
MCV RBC AUTO: 88.1 FL — SIGNIFICANT CHANGE UP (ref 80–100)
PCO2 BLDA: 54 MMHG — HIGH (ref 32–46)
PH BLDA: 7.39 — SIGNIFICANT CHANGE UP (ref 7.35–7.45)
PHOSPHATE SERPL-MCNC: 3.8 MG/DL — SIGNIFICANT CHANGE UP (ref 2.5–4.5)
PLATELET # BLD AUTO: 236 K/UL — SIGNIFICANT CHANGE UP (ref 150–400)
PO2 BLDA: 98 MMHG — SIGNIFICANT CHANGE UP (ref 74–108)
POTASSIUM SERPL-MCNC: 5.4 MMOL/L — HIGH (ref 3.5–5.3)
POTASSIUM SERPL-SCNC: 5.4 MMOL/L — HIGH (ref 3.5–5.3)
RBC # BLD: 5 M/UL — SIGNIFICANT CHANGE UP (ref 3.8–5.2)
RBC # FLD: 13.6 % — SIGNIFICANT CHANGE UP (ref 10.3–14.5)
SAO2 % BLDA: 98 % — HIGH (ref 92–96)
SODIUM SERPL-SCNC: 136 MMOL/L — SIGNIFICANT CHANGE UP (ref 135–145)
WBC # BLD: 14.6 K/UL — HIGH (ref 3.8–10.5)
WBC # FLD AUTO: 14.6 K/UL — HIGH (ref 3.8–10.5)

## 2018-12-11 PROCEDURE — 99232 SBSQ HOSP IP/OBS MODERATE 35: CPT

## 2018-12-11 PROCEDURE — 99233 SBSQ HOSP IP/OBS HIGH 50: CPT | Mod: GC

## 2018-12-11 RX ORDER — MORPHINE SULFATE 50 MG/1
15 CAPSULE, EXTENDED RELEASE ORAL ONCE
Qty: 0 | Refills: 0 | Status: DISCONTINUED | OUTPATIENT
Start: 2018-12-11 | End: 2018-12-11

## 2018-12-11 RX ORDER — MORPHINE SULFATE 50 MG/1
15 CAPSULE, EXTENDED RELEASE ORAL
Qty: 0 | Refills: 0 | Status: DISCONTINUED | OUTPATIENT
Start: 2018-12-11 | End: 2018-12-12

## 2018-12-11 RX ORDER — ONDANSETRON 8 MG/1
4 TABLET, FILM COATED ORAL ONCE
Qty: 0 | Refills: 0 | Status: COMPLETED | OUTPATIENT
Start: 2018-12-11 | End: 2018-12-11

## 2018-12-11 RX ADMIN — ROSUVASTATIN CALCIUM 10 MILLIGRAM(S): 5 TABLET ORAL at 22:12

## 2018-12-11 RX ADMIN — INSULIN GLARGINE 26 UNIT(S): 100 INJECTION, SOLUTION SUBCUTANEOUS at 22:13

## 2018-12-11 RX ADMIN — Medication 1 DROP(S): at 22:13

## 2018-12-11 RX ADMIN — Medication 3 MILLILITER(S): at 18:05

## 2018-12-11 RX ADMIN — ENOXAPARIN SODIUM 40 MILLIGRAM(S): 100 INJECTION SUBCUTANEOUS at 22:12

## 2018-12-11 RX ADMIN — Medication 14 UNIT(S): at 18:06

## 2018-12-11 RX ADMIN — Medication 3 MILLILITER(S): at 13:26

## 2018-12-11 RX ADMIN — Medication 20 MILLIGRAM(S): at 18:06

## 2018-12-11 RX ADMIN — ISOSORBIDE MONONITRATE 30 MILLIGRAM(S): 60 TABLET, EXTENDED RELEASE ORAL at 12:12

## 2018-12-11 RX ADMIN — Medication 500 MILLIGRAM(S): at 18:07

## 2018-12-11 RX ADMIN — MORPHINE SULFATE 15 MILLIGRAM(S): 50 CAPSULE, EXTENDED RELEASE ORAL at 22:34

## 2018-12-11 RX ADMIN — Medication 0.5 MILLIGRAM(S): at 18:06

## 2018-12-11 RX ADMIN — Medication 20 MILLIGRAM(S): at 12:12

## 2018-12-11 RX ADMIN — PANTOPRAZOLE SODIUM 40 MILLIGRAM(S): 20 TABLET, DELAYED RELEASE ORAL at 06:56

## 2018-12-11 RX ADMIN — Medication 100 MILLIGRAM(S): at 12:12

## 2018-12-11 RX ADMIN — Medication 2: at 18:07

## 2018-12-11 RX ADMIN — Medication 1 TABLET(S): at 12:11

## 2018-12-11 RX ADMIN — Medication 3 MILLILITER(S): at 10:44

## 2018-12-11 RX ADMIN — MORPHINE SULFATE 15 MILLIGRAM(S): 50 CAPSULE, EXTENDED RELEASE ORAL at 12:14

## 2018-12-11 RX ADMIN — Medication 500 MILLIGRAM(S): at 06:56

## 2018-12-11 RX ADMIN — Medication 1 DROP(S): at 06:56

## 2018-12-11 RX ADMIN — Medication 0.5 MILLIGRAM(S): at 07:08

## 2018-12-11 RX ADMIN — ONDANSETRON 4 MILLIGRAM(S): 8 TABLET, FILM COATED ORAL at 18:47

## 2018-12-11 RX ADMIN — MORPHINE SULFATE 15 MILLIGRAM(S): 50 CAPSULE, EXTENDED RELEASE ORAL at 22:04

## 2018-12-11 RX ADMIN — Medication 500000 UNIT(S): at 12:13

## 2018-12-11 RX ADMIN — Medication 12 UNIT(S): at 09:21

## 2018-12-11 RX ADMIN — Medication 400 MILLIGRAM(S): at 12:13

## 2018-12-11 RX ADMIN — MORPHINE SULFATE 15 MILLIGRAM(S): 50 CAPSULE, EXTENDED RELEASE ORAL at 13:30

## 2018-12-11 RX ADMIN — Medication 500000 UNIT(S): at 06:56

## 2018-12-11 RX ADMIN — MONTELUKAST 10 MILLIGRAM(S): 4 TABLET, CHEWABLE ORAL at 12:11

## 2018-12-11 RX ADMIN — Medication 12 UNIT(S): at 13:24

## 2018-12-11 RX ADMIN — Medication 2000 UNIT(S): at 12:11

## 2018-12-11 RX ADMIN — AMLODIPINE BESYLATE 5 MILLIGRAM(S): 2.5 TABLET ORAL at 06:57

## 2018-12-11 RX ADMIN — Medication 20 MILLIGRAM(S): at 06:57

## 2018-12-11 RX ADMIN — Medication 3 MILLILITER(S): at 06:55

## 2018-12-11 RX ADMIN — Medication 1 DROP(S): at 13:28

## 2018-12-11 RX ADMIN — Medication 81 MILLIGRAM(S): at 12:11

## 2018-12-11 RX ADMIN — Medication 3 MILLILITER(S): at 22:16

## 2018-12-11 NOTE — PROGRESS NOTE ADULT - PROBLEM SELECTOR PLAN 1
-test BG AC/HS  -C/w Lantus 26 units QHS  -C/w  Humalog 12-12-14 ac meals for now.   -c/w Humalog moderate correction scale AC and Mod HS scale  -Please notify endocrine when steroids further tapered. Will need adjustment to insulin regimen.  -Plan discussed with pt/team.  Contact info: [phone] (24/7). pager [phone]

## 2018-12-11 NOTE — PROGRESS NOTE ADULT - ASSESSMENT
57 y/o F w/h/o controlled T2DM on Metformin 500mg bid. Also h/o CAD and COPD. Here with COPD exacerbation on Methylprednisolone 20 mg q6h and rebound hyperglycemia. Variable PO intake with variable BG levels but improving after increasing basal/bolus insulin doses yesterday. No hypoglycemia. BG goal (100-200mg/dl). Will follow and adjust as needed.

## 2018-12-11 NOTE — PROGRESS NOTE ADULT - PROBLEM SELECTOR PLAN 4
Improved after diamox.  Thought to be due to secondary to Benicar and hyperglycemia.  Benicar has been discontinued.  Continue Norvasc 5 mg daily.  Glycemic control as above.  Pt with metabolic alk 2/2 chronic resp acidosis   s/p diamox and kayxelate 12/8   d/w renal appreciate recs Thought to be due to secondary to Benicar and hyperglycemia.  Benicar has been discontinued.  Continue Norvasc 5 mg daily.  Glycemic control as above.  Pt with metabolic alk 2/2 chronic resp acidosis   s/p diamox and kayxelate 12/8   d/w renal appreciate recs

## 2018-12-11 NOTE — PROGRESS NOTE ADULT - PROBLEM SELECTOR PLAN 3
A1C 7.2, but with hyperglycemia in setting of Solumedrol use.  Increased Lantus to 27 units and changed humalog to 12-12-14.  Continue medium dose SSI.  Continue to monitor blood sugars.  Endocrine recs appreciated.

## 2018-12-11 NOTE — PROGRESS NOTE ADULT - ASSESSMENT
Chief Complaint(s):   Chief Complaint   Patient presents with   • Back Pain     lower   • Pain     bilateral gluteal     Date of Injury: 3/9/18  Initial Treatment Date: 6/16/18   Date Last Seen: initial visit   Mechanism of Onset: suddenly  Occupation:   Referred by:DRAGAN Payne  Primary Care Physician: Eliana Fraga MD  Date informed consent signed:  6/16/18   Kiya  reports that she quit smoking about 13 years ago. Her smoking use included Cigarettes. She has a 1.80 pack-year smoking history. She has never used smokeless tobacco.  Kiya is allergic to guaifenesin and lactose.  Denies allergy to latex or sensitivity to latex.  Advance Directive Status:none   Visit Number of Current Episode: 1  Discharged from care: No  Initial pain rating: 3    Relevant Diagnostic Imaging/Testing:   See chart    SUBJECTIVE:  Kiya is a pleasant 39 year old female that presents to our office today for an evaluation and treatment of lower and bilateral gluteal pain. She states that she woke up on 3/9/18 with pain. Ariela is not aware of doing anything to cause the pain. She describes the pain as an ache. Stress,lumbar flexion,prolonged sitting, prolonged standing,walking and exercise makes her pain flare up. Lying down and massage helps reduce her pain. It is difficult for her to bend over and \" stuff \"off the floor. Her sleep is affected by the pain. She rates her pain today at 3/10 with 10 being the worst.  Ariela has a history of seeing pain management and has had caudal epidural steroid intralaminar injections on 4/12/18 and 6/5/18 with Dr. Ibrahim. She reports that she has not improved with her 2nd epidural.       CHIROPRACTIC PATIENT HISTORY:   How often do you experience your symptoms?  -0-25% of the day     On roughly what date did your present pain start: 3/9/18     How long does each episode last: consistent pain  How long have you had similar pain? 3/9/18     Over time are your  symptoms:Getting better     Rate how bad your symptoms are 0 being no pain and 10 unbearable at their:  Worst 3  Best 2    Patient’s current work status:   Are you still working? Yes    How much does your job require you to lift? 10 lbs.    Are you totally disabled from work? No     Are you on work restrictions?No     How did your pain start?  Suddenly    When does your pain feel worse? Emotional stress, Bending forward, Sitting, Standing, Walking, During exercise, After exercise    If you have had an adjustment or manipulation before, what date was your appointment? Not answered    How did it affect your symptoms? Not answered      What reduces the pain?  Massage, Lying down    Indicate any of the following diagnostic studies that you have had in the last 3 years for your complaints. See chart    List any hospitalizations, surgeries, or serious injuries beginning with those that concern your current problem.none listed    Medications currently taking:   Herbal remedies/vitamins, Muscle relaxants, Cortisone/Other Steroids    Indicate any conditions you may have had. Arthritis,fibromyalgiadiverticulitis    Do you have any diet restriction? Not answered      What position do you sleep in on your Back, Right side, Left side  What type Is your mattress Firm    On average how many times per week do you exercise for at least 20 minutes? 5  What type of exercise do you perform? Stretching, Strengthening and Aerobic    What other health providers have you seen for your chief complaints? Pain management    Initial [place] Pain Supplemental Questionnaire score was 10/40.    During the past 24 hours, how has pain interfered with normal activities: 3/10  During the past 24 hours, how has pain interfered with your sleep: 3/10  During the past 24 hours, how has pain affected your mood: 1/10  During the past 24 hours, how has pain contributed to your stress: 3/10    HEALTH HISTORY:    NO HX OF                                                       Anemia                                                        Fibroids                                                      Esophageal reflux                               6/15/2015     Stricture and stenosis of esophagus             6/15/2015       Comment: Schatzki's Ring dilated    Hiatal hernia                                   6/15/2015     Gastritis                                       6/15/2015     Alopecia                                                      Primary osteoarthritis of left knee             4/7/2017      Genetic counseling and testing                                  Comment: Negative Custom Panel (Hi/Mod Risk Breast,                Melanoma Genes), [employer], 09/2017  The patient's family health history includes: diabetes,rheumatoid arthritis,cancer,lupus          ASSESSMENT:    ongoing dyspnea with minimal exertion with chronic hypoxic and hypercapnic respiratory failure due to very severe COPD with "exacerbation" - adequate oxygenation on a nasal canula in the setting of profound dyspnea suggests that alterations in the mechanics of breathing due to lung hyperinflation are playing a significant role in shortness of breath - there is evidence of CAD without pulmonary hypertension on the CT scan which is without pulmonary emboli - ECHO has a preserved LVEF, no significant valvular heart disease and no pulmonary hypertension - would not use diamox to treat the patient's metabolic alkalosis which is compensatory for her chronic respiratory acidosis - she will unlikely be able to increase her minute ventilation to prevent worsening acidemia     HTN/HLD/DM    CAD s/p PCI    PAD    extremity swelling likely related to steroid induced fluid retention and nocturnal hypoxemia - no evidence of DVT on lower extremity Duplex examination    PLAN/RECOMMENDATIONS:    oxygen supplementation to keep saturation greater than 92%  check ABG  would start MS contin 15mg 2 times daily if pH is ~ 7.4 watching closely for confusion, sedation and CO2 retention  sputum culture - no growth - complete a 5 day course of biaxin  home trilogy vent has been arranged with [facility] - nocturnal BIPAP 12/5 with 40% FiO2  albuterol/atrovent nebs q4h holding 2AM dose  pulmicort 0.5mg nebs q12h  singulair/theophylline - check theophylline level  continue solumedrol to 20mg IVP q6h - glucose control on high dose steroids - nystatin  cardiac meds: ASA/crestor/imdur/norvasc  cardiology evaluation - ECHO with preserved LVEF, no evidence of valvular heart disease and no evidence of right ventricular dysfunction despite chronic hypoxemia  DVT prophylaxis - SQ lovenox  thigh high TOMAS stockings - change to small  daily physical therapy - needs to walk multiple times a day as able  continue outpatient pulmonary rehabilitation  outpatient evaluation for lung transplantation ongoing    Will follow with you. Plan of care discussed with the patient at bedside and the medical team. She will take many months to return to close to her baseline respiratory status following this exacerbation.    David Fair MD, [facility] - [phone]  Pulmonary Medicine

## 2018-12-11 NOTE — PROGRESS NOTE ADULT - SUBJECTIVE AND OBJECTIVE BOX
Diabetes Follow up note: Saw pt earlier today  Interval Hx: 57 y/o F w/h/o controlled T2DM on Metformin 500mg bid. Also h/o CAD and COPD. Here with COPD exacerbation on Methylprednisolone> remains on 20mg q6h. Reports tolerating POs with erratic PO intake since pt dislikes hospital food and eats at different hours during the day. Glycemic control improved while on present insulin doses. Pt respiratory condition unchanged per pt.  No hypoglycemia.      Review of Systems:  General: as above  CV: No CP  GI: Tolerating POs without any N/V/D/ABD PAIN.  Resp: NIELSON.   ENDO: No S&Sx of hypoglycemia      MEDS:  insulin glargine Injectable (LANTUS) 26 Unit(s) SubCutaneous at bedtime  insulin lispro (HumaLOG) corrective regimen sliding scale   SubCutaneous three times a day before meals  insulin lispro (HumaLOG) corrective regimen sliding scale   SubCutaneous at bedtime  insulin lispro Injectable (HumaLOG) 14 Unit(s) SubCutaneous before dinner  insulin lispro Injectable (HumaLOG) 12 Unit(s) SubCutaneous before breakfast  insulin lispro Injectable (HumaLOG) 12 Unit(s) SubCutaneous before lunch  methylPREDNISolone sodium succinate Injectable 20 milliGRAM(s) IV Push every 6 hours  rosuvastatin 10 milliGRAM(s) Oral at bedtime  clarithromycin 500 milliGRAM(s) Oral two times a day  theophylline ER Capsule 400 milliGRAM(s) Oral daily  cholecalciferol 2000 Unit(s) Oral daily      Allergies    No Known Allergies      PE:  General: Female lying in bed. On O2.   Vital Signs Last 24 Hrs  T(C): 36.7 (12-11-18 @ 06:30), Max: 36.7 (12-11-18 @ 06:30)  T(F): 98 (12-11-18 @ 06:30), Max: 98 (12-11-18 @ 06:30)  HR: 90 (12-11-18 @ 16:04) (89 - 100)  BP: 118/70 (12-11-18 @ 09:17) (107/63 - 118/70)  BP(mean): --  RR: 20 (12-11-18 @ 06:30) (18 - 20)  SpO2: 100% (12-11-18 @ 16:04) (96% - 100%)  Abd: Soft, NT, ND, Obese.   Extremities: Warm. B/L trace edema. TOMAS stockings in place.   Neuro: A&O X3    LABS:  POCT Blood Glucose.: 134 mg/dL (12-11-18 @ 12:11)  POCT Blood Glucose.: 136 mg/dL (12-11-18 @ 08:17)  POCT Blood Glucose.: 116 mg/dL (12-10-18 @ 22:12)  POCT Blood Glucose.: 125 mg/dL (12-10-18 @ 19:41)  POCT Blood Glucose.: 87 mg/dL (12-10-18 @ 17:20)  POCT Blood Glucose.: 219 mg/dL (12-10-18 @ 12:51)  POCT Blood Glucose.: 204 mg/dL (12-10-18 @ 08:52)  POCT Blood Glucose.: 307 mg/dL (12-09-18 @ 22:50)  POCT Blood Glucose.: 185 mg/dL (12-09-18 @ 16:52)                          14.5   14.6  )-----------( 236      ( 11 Dec 2018 09:49 )             44.1               12-11    136  |  92<L>  |  46<H>  ----------------------------<  133<H>  5.4<H>   |  34<H>  |  1.22    Ca    9.4      11 Dec 2018 09:46  Phos  3.8     12-11  Mg     2.4     12-11        Hemoglobin A1C, Whole Blood: 7.2 % <H> [4.0 - 5.6] (11-30-18 @ 07:58)

## 2018-12-11 NOTE — PROGRESS NOTE ADULT - PROBLEM SELECTOR PLAN 8
Dopplers negative for DVT.  Echo report noted, mild diastolic dysfunction, no significant changes from prior study in 2014.  Suspect leg edema may be related to recent high dose steroids.  C/w compression stockings   Need to monitor for any worsening leg edema with initiation of Norvasc.  Nephro recommending lasix 20 mg x 1 dose. Dopplers negative for DVT.  Echo report noted, mild diastolic dysfunction, no significant changes from prior study in 2014.  Suspect leg edema may be related to recent high dose steroids.  C/w compression stockings   Need to monitor for any worsening leg edema with initiation of Norvasc.

## 2018-12-11 NOTE — PROGRESS NOTE ADULT - PROBLEM SELECTOR PLAN 2
Continue COPD management as above.  ABG improved with one time dose of Diamox.   Will repeat ABG today.  Continue on BIPAP. Continue COPD management as above.  ABG improved with one time dose of Diamox.   Continue on BIPAP.

## 2018-12-11 NOTE — PROGRESS NOTE ADULT - PROBLEM SELECTOR PLAN 5
Has BM every other day at home at baseline.  Currently on senna, colace, miralax. Give one time dose of dulcolax suppository with good result. Has BM every other day at home at baseline.  Currently on senna, colace, miralax. Given one time dose of dulcolax suppository with good results pt had bm yesterday

## 2018-12-11 NOTE — PROGRESS NOTE ADULT - SUBJECTIVE AND OBJECTIVE BOX
Patient is a 58y old  Female who presents with a chief complaint of NIELSON (09 Dec 2018 12:23)    SUBJECTIVE / OVERNIGHT EVENTS:  Patient seen and examined by bedside. She states she feels the same without any improvement. No pain. Upset about getting ABG done.      MEDICATIONS  (STANDING):  ALBUTerol/ipratropium for Nebulization 3 milliLiter(s) Nebulizer <User Schedule>  amLODIPine   Tablet 5 milliGRAM(s) Oral daily  artificial tears (preservative free) Ophthalmic Solution 1 Drop(s) Both EYES three times a day  aspirin enteric coated 81 milliGRAM(s) Oral daily  buDESOnide   0.5 milliGRAM(s) Respule 0.5 milliGRAM(s) Inhalation every 12 hours  cholecalciferol 2000 Unit(s) Oral daily  clarithromycin 500 milliGRAM(s) Oral two times a day  dextrose 5%. 1000 milliLiter(s) (50 mL/Hr) IV Continuous <Continuous>  dextrose 50% Injectable 12.5 Gram(s) IV Push once  dextrose 50% Injectable 25 Gram(s) IV Push once  dextrose 50% Injectable 25 Gram(s) IV Push once  docusate sodium 100 milliGRAM(s) Oral daily  enoxaparin Injectable 40 milliGRAM(s) SubCutaneous every 24 hours  insulin glargine Injectable (LANTUS) 24 Unit(s) SubCutaneous at bedtime  insulin lispro (HumaLOG) corrective regimen sliding scale   SubCutaneous three times a day before meals  insulin lispro (HumaLOG) corrective regimen sliding scale   SubCutaneous at bedtime  insulin lispro Injectable (HumaLOG) 12 Unit(s) SubCutaneous before dinner  insulin lispro Injectable (HumaLOG) 8 Unit(s) SubCutaneous before breakfast  insulin lispro Injectable (HumaLOG) 9 Unit(s) SubCutaneous before lunch  isosorbide   mononitrate ER Tablet (IMDUR) 30 milliGRAM(s) Oral daily  methylPREDNISolone sodium succinate Injectable 20 milliGRAM(s) IV Push every 6 hours  montelukast 10 milliGRAM(s) Oral daily  multivitamin 1 Tablet(s) Oral daily  nystatin    Suspension [id number] Unit(s) Oral four times a day  pantoprazole    Tablet 40 milliGRAM(s) Oral before breakfast  rosuvastatin 10 milliGRAM(s) Oral at bedtime  senna 2 Tablet(s) Oral at bedtime  theophylline ER Capsule 400 milliGRAM(s) Oral daily    MEDICATIONS  (PRN):  dextrose 40% Gel 15 Gram(s) Oral once PRN Blood Glucose LESS THAN 70 milliGRAM(s)/deciliter  glucagon  Injectable 1 milliGRAM(s) IntraMuscular once PRN Glucose LESS THAN 70 milligrams/deciliter  polyethylene glycol 3350 17 Gram(s) Oral once PRN Constipation  sodium chloride 0.65% Nasal 1 Spray(s) Both Nostrils two times a day PRN Nasal Congestion    CAPILLARY BLOOD GLUCOSE  POCT Blood Glucose.: 200 mg/dL (09 Dec 2018 12:43)  POCT Blood Glucose.: 235 mg/dL (09 Dec 2018 08:32)  POCT Blood Glucose.: 232 mg/dL (09 Dec 2018 01:48)  POCT Blood Glucose.: 426 mg/dL (08 Dec 2018 21:01)  POCT Blood Glucose.: 425 mg/dL (08 Dec 2018 20:58)  POCT Blood Glucose.: 250 mg/dL (08 Dec 2018 17:28)    I&O's Summary    08 Dec 2018 07:01  -  09 Dec 2018 07:00  --------------------------------------------------------  IN: 480 mL / OUT: 0 mL / NET: 480 mL    09 Dec 2018 07:01  -  09 Dec 2018 13:26  --------------------------------------------------------  IN: 240 mL / OUT: 0 mL / NET: 240 mL    PHYSICAL EXAM: unchanged from yesterday  GENERAL: minimal distress when talking, on oxygen 5L saturating 100%; well-developed  HEAD:  Atraumatic, Normocephalic  EYES: conjunctiva and sclera clear  CHEST/LUNG: decreased breath sounds b/l No wheeze  HEART: Regular rate and rhythm; S1S2  ABDOMEN: Soft, Nontender, Nondistended; Bowel sounds present  EXTREMITIES:  2+ Peripheral Pulses, No clubbing, cyanosis, or edema  PSYCH: AAOx3  NEUROLOGY: non-focal  SKIN: No rashes or lesions    LABS:                        13.6   16.16 )-----------( 252      ( 09 Dec 2018 09:09 )             40.8     12-09  134<L>  |  93<L>  |  37<H>  ----------------------------<  250<H>  5.2   |  32<H>  |  1.01    Ca    9.2      09 Dec 2018 07:49  Phos  4.2     12-09  Mg     2.2     12-09    RADIOLOGY & ADDITIONAL TESTS:  Imaging Personally Reviewed:  Consultant(s) Notes Reviewed:  renal   Care Discussed with Consultants/Other Providers: renal

## 2018-12-11 NOTE — PROGRESS NOTE ADULT - SUBJECTIVE AND OBJECTIVE BOX
[facility] - [facility]     PROGRESS NOTE    CHIEF COMPLAINT: chronic hypoxic/hypercapnic respiratory failure; COPD exacerbation; emphysema; dyspnea    INTERVAL HISTORY: looks somewhat more short of breath at rest using pursed lip breathing; severely dyspneic with minimal exertion; no cough, sputum production, chest congestion or wheeze; did not use BIPAP overnight; no fevers, chills or sweats; no chest pain/pressure or palpitations; hoarseness improved; hyperglycemia; CTA without PE; leg swelling despite thigh high TOMAS stockings; now with upper extremity swelling; given diamox over the weekend with decrease in HCO3 - no repeat ABG; sputum cultures without growth x 2;     REVIEW OF SYSTEMS:  Constitutional: As per interval history  HEENT: Within normal limits  CV: As per interval history  Resp: As per interval history  GI: Within normal limits   : Within normal limits  Musculoskeletal: pain/numbness lower extremities with ambulation  Skin: Within normal limits  Neurological: Within normal limits  Psychiatric: frustrated  Endocrine: hyperglycemia  Hematologic/Lymphatic: Within normal limits  Allergic/Immunologic: Within normal limits      MEDICATIONS:     Pulmonary "  ALBUTerol/ipratropium for Nebulization 3 milliLiter(s) Nebulizer <User Schedule>  buDESOnide   0.5 milliGRAM(s) Respule 0.5 milliGRAM(s) Inhalation every 12 hours  montelukast 10 milliGRAM(s) Oral daily  theophylline ER Capsule 400 milliGRAM(s) Oral daily      Anti-microbials:  clarithromycin 500 milliGRAM(s) Oral two times a day  nystatin    Suspension [id number] Unit(s) Oral four times a day      Cardiovascular:  amLODIPine   Tablet 5 milliGRAM(s) Oral daily  isosorbide   mononitrate ER Tablet (IMDUR) 30 milliGRAM(s) Oral daily      Other:  artificial tears (preservative free) Ophthalmic Solution 1 Drop(s) Both EYES three times a day  aspirin enteric coated 81 milliGRAM(s) Oral daily  bisacodyl Suppository 10 milliGRAM(s) Rectal daily PRN  cholecalciferol 2000 Unit(s) Oral daily  dextrose 40% Gel 15 Gram(s) Oral once PRN  dextrose 5%. 1000 milliLiter(s) IV Continuous <Continuous>  dextrose 50% Injectable 12.5 Gram(s) IV Push once  dextrose 50% Injectable 25 Gram(s) IV Push once  dextrose 50% Injectable 25 Gram(s) IV Push once  docusate sodium 100 milliGRAM(s) Oral daily  enoxaparin Injectable 40 milliGRAM(s) SubCutaneous every 24 hours  glucagon  Injectable 1 milliGRAM(s) IntraMuscular once PRN  insulin glargine Injectable (LANTUS) 26 Unit(s) SubCutaneous at bedtime  insulin lispro (HumaLOG) corrective regimen sliding scale   SubCutaneous three times a day before meals  insulin lispro (HumaLOG) corrective regimen sliding scale   SubCutaneous at bedtime  insulin lispro Injectable (HumaLOG) 14 Unit(s) SubCutaneous before dinner  insulin lispro Injectable (HumaLOG) 12 Unit(s) SubCutaneous before breakfast  insulin lispro Injectable (HumaLOG) 12 Unit(s) SubCutaneous before lunch  methylPREDNISolone sodium succinate Injectable 20 milliGRAM(s) IV Push every 6 hours  morphine ER Tablet 15 milliGRAM(s) Oral once  multivitamin 1 Tablet(s) Oral daily  pantoprazole    Tablet 40 milliGRAM(s) Oral before breakfast  rosuvastatin 10 milliGRAM(s) Oral at bedtime  senna 2 Tablet(s) Oral at bedtime  sodium chloride 0.65% Nasal 1 Spray(s) Both Nostrils two times a day PRN        OBJECTIVE:    I&O's Detail    10 Dec 2018 07:01  -  11 Dec 2018 07:00  --------------------------------------------------------  IN:    Oral Fluid: 1650 mL  Total IN: 1650 mL    OUT:  Total OUT: 0 mL    Total NET: 1650 mL    POCT Blood Glucose.: 136 mg/dL (11 Dec 2018 08:17)  POCT Blood Glucose.: 116 mg/dL (10 Dec 2018 22:12)  POCT Blood Glucose.: 125 mg/dL (10 Dec 2018 19:41)  POCT Blood Glucose.: 87 mg/dL (10 Dec 2018 17:20)  POCT Blood Glucose.: 219 mg/dL (10 Dec 2018 12:51)      PHYSICAL EXAM:       ICU Vital Signs Last 24 Hrs  T(C): 36.7 (11 Dec 2018 06:30), Max: 36.7 (11 Dec 2018 06:30)  T(F): 98 (11 Dec 2018 06:30), Max: 98 (11 Dec 2018 06:30)  HR: 95 (11 Dec 2018 09:17) (89 - 100)  BP: 107/63 (11 Dec 2018 06:30) (107/63 - 115/68)  BP(mean): --  ABP: --  ABP(mean): --  RR: 20 (11 Dec 2018 06:30) (18 - 20)  SpO2: 98% (11 Dec 2018 09:17) (96% - 100%) on 5lpm nasal canula    General: Awake. Alert. Cooperative. No distress. Appears stated age. Obese 	  HEENT:  Atraumatic. Normocephalic. Anicteric. Normal oral mucosa. PERRL. EOMI.  Neck: Supple. Trachea midline. Thyroid without enlargement/tenderness/nodules. No carotid bruit. No JVD.	  Cardiovascular: Regular rate and rhythm. Distant S1 S2. No murmurs, rubs or gallops.  Respiratory: Respirations unlabored. Markedly decreased breath sounds throughout. Prolonged expiratory phase of respiration. No wheeze. No curvature.  Abdomen: Soft. Non-tender. Non-distended. No organomegaly. No masses. Normal bowel sounds. Obese.  Extremities: Warm to touch. No clubbing or cyanosis. Mild bilateral lower extremity edema up to the thigh with TOMAS stockings in place. Mild bilateral upper extremity swelling right > left  Pulses: Decreased lower extremity peripheral pulses.  Skin: No rashes or lesions. No ecchymoses. No cyanosis. Warm to touch.  Lymph Nodes: Cervical, supraclavicular and axillary nodes normal  Neurological: Motor and sensory examination equal and normal. A and O x 3  Psychiatry: Appropriate mood and affect.         LABS:                        14.5   14.6  )-----------( 236      ( 11 Dec 2018 09:49 )             44.1                         12.8   17.12 )-----------( 236      ( 10 Dec 2018 07:52 )             39.5     12-11    136  |  92<L>  |  46<H>  ----------------------------<  133<H>  5.4<H>   |  34<H>  |  1.22    12-10    134<L>  |  93<L>  |  42<H>  ----------------------------<  303<H>  4.6   |  30  |  1.17    Ca      9.4      12-11    Ca      9.1      12-10    Phos    3.8     12-11    Phos    3.0     12-10      Mg       2.4     12-11    Mg       2.1     12-10    ABG - ( 09 Dec 2018 11:26 )  pH: 7.35  /  pCO2: 63    /  pO2: 145   / HCO3: 34    / Base Excess: 6.6   /  SaO2: 99      ABG - ( 09 Dec 2018 00:28 )  pH: 7.32  /  pCO2: 71    /  pO2: 124   / HCO3: 36    / Base Excess: 7.6   /  SaO2: 99        ABG - ( 08 Dec 2018 20:50 )  pH: 7.32  /  pCO2: 72    /  pO2: 80    / HCO3: 36    / Base Excess: 7.7   /  SaO2: 95        < from: Transthoracic Echocardiogram (12.07.18 @ 08:59) >    Patient name: GUY HARTMAN  YOB: 1960   Age: 58 (F)   MR#: [id number]  Study Date: 12/7/2018  Location: [address] [place], [place] [id number]: Laquita Armando RUST  Study quality: Technically good  Referring Physician: Allen ingram MD  BloodPressure: 120/80 mmHg  Height: 163 cm  Weight: 88 kg  BSA: 1.9 m2  ------------------------------------------------------------------------  PROCEDURE: Transthoracic echocardiogram with 2-D, M-Mode  and complete spectral and color flow Doppler.  INDICATION: Other forms of dyspnea (R06.09)  ------------------------------------------------------------------------  Dimensions:    Normal Values:  LA:     3.6    2.0 - 4.0 cm  Ao:     2.9    2.0 - 3.8 cm  SEPTUM: 0.9    0.6 - 1.2 cm  PWT:    0.8    0.6- 1.1 cm  LVIDd:  4.9    3.0 - 5.6 cm  LVIDs:  2.9    1.8 - 4.0 cm  Derived variables:  LVMI: 73 g/m2  RWT: 0.32  Fractional short: 40 %  EF (Teicholtz): 71 %  Doppler Peak Velocity (m/sec): AoV=1.2  ------------------------------------------------------------------------  Observations:  Mitral Valve: Normal mitral valve.  Aortic Valve/Aorta: Normal trileaflet aortic valve. Peak  transaortic valve gradient equals 6 mm Hg, mean transaortic  valve gradient equals 3 mm Hg, aortic valve velocity time  integral equals 22 cm. Trace aortic regurgitation.  Aortic Root: 2.9 cm.  Left Atrium: Normal left atrium.  LA volume index = 18  cc/m2.  Left Ventricle: Normal left ventricular systolic function.  No segmental wall motion abnormalities. Normal left  ventricular internal dimensions and wall thicknesses. Mild  diastolic dysfunction (Stage I).  Right Heart: Normal right atrium. Normal right ventricular  size and function. Normal tricuspid valve. Minimal  tricuspid regurgitation. Normal pulmonic valve.  Pericardium/Pleura: Normal pericardium with no pericardial  effusion.  Hemodynamic: Estimated right atrial pressure is 8 mm Hg.  Inadequate tricuspid regurgitation Doppler envelope  precludes estimation of RVSP.  ------------------------------------------------------------------------  Conclusions:  1. Normal mitral valve.  2. Normal trileaflet aortic valve. Peak transaortic valve  gradient equals 6 mm Hg, mean transaortic valve gradient  equals 3 mm Hg, aortic valve velocity time integral equals  22 cm. Trace aortic regurgitation.  3. Aortic Root: 2.9 cm.  4. Normal left atrium.  LA volume index = 18 cc/m2.  5. Normal left ventricular internal dimensions and wall  thicknesses.  6. Normal left ventricular systolic function. No segmental  wall motion abnormalities.  7. Mild diastolic dysfunction (Stage I).  8. Normal right ventricular size and function.  9. Inadequate tricuspid regurgitation Doppler envelope  precludes estimation of RVSP.  10. Normal tricuspid valve. Minimal tricuspid  regurgitation.  *** Compared with echocardiogram report of 2/18/2014, no  significant changes noted.  ------------------------------------------------------------------------  Confirmed on  12/7/2018 - 13:49:43 by Shefali Gómez M.D.  ------------------------------------------------------------------------    < end of copied text >  ---------------------------------------------------------------------------------------------------------------    MICROBIOLOGY:     Culture - Sputum . (12.07.18 @ 08:34)    Gram Stain:   Rare polymorphonuclear leukocytes per low power field  Rare Squamous epithelial cells per low power field  Numerous Gram Positive Cocci in Pairs and Chains per oil power field    Specimen Source: .Sputum Sputum    Culture Results:   Normal Respiratory Samaria present    Culture - Sputum . (12.05.18 @ 22:50)    Gram Stain:   Moderate polymorphonuclear leukocytes per low power field  Few Squamous epithelial cells per low power field  Moderate Gram Positive Cocci in Pairs and Chains per oil power field    Specimen Source: .Sputum Sputum    Culture Results:   Normal Respiratory Samaria present    Rapid Respiratory Viral Panel (11.30.18 @ 01:30)    Rapid RVP Result: NotDete: The [employer] RVP Rapid uses polymerase chain reaction (PCR) and melt  curve analysis to screen for adenovirus; coronavirus HKU1, NL63, 229E,  OC43; human metapneumovirus (hMPV); human enterovirus/rhinovirus  (Entero/RV); influenza A; influenza A/H1;influenza A/H3; influenza  A/H1-2009; influenza B; parainfluenza viruses 1, 2, 3, 4; respiratory  syncytial virus; Bordetella pertussis; Mycoplasma pneumoniae; and  Chlamydophila pneumoniae.    RADIOLOGY:  [x] Chest radiographs reviewed and interpreted by me    < from: CT Angio Chest w/ IV Cont (12.04.18 @ 16:30) >    EXAM:  CT ANGIO CHEST (W)AW IC                          PROCEDURE DATE:  12/04/2018      INTERPRETATION:  CT CHEST WITH CONTRAST    INDICATION: Known COPD not responding to steroids and bronchodilators.   Progressively worsening dyspnea. Evaluate for pulmonary embolism.    TECHNIQUE: Enhanced helical images were obtained of the chest. Coronal   and sagittal images were reconstructed. Maximum intensity projection   images were generated. Images were obtained after the uneventful   administration of 60 cc nonionic intravenous Omnipaque 350.  40 cc of   Omnipaque 350 was discarded.    COMPARISON: CT chest 2/18/2014.    FINDINGS:     Lungs And Airways: Emphysematous changes of the lung.      The airways are unremarkable.      Pleura: No pneumothorax. No pleural effusion.    Mediastinum: There are no enlarged chest lymph nodes. The visualized   portion of the thyroid gland is unremarkable.       Heart and Vasculature: No pulmonary embolism.    The main pulmonary artery is normal in caliber. No thoracic aortic   aneurysm or dissection. Atheromatous disease of the aorta.    The heart is normal in size.  There is no pericardial effusion.   Coronary artery disease with calcified plaque involving the right and   left main coronary arteries and the left anterior descending artery.      Upper Abdomen: The upper abdomen is unremarkable.    Bones And Soft Tissues: Degenerative changes of the spine.  The soft   tissues are unremarkable.      IMPRESSION:   1.  No pulmonary embolism.  2. Emphysematous changes of the lung.    DIANA MEDINA M.D., RADIOLOGY RESIDENT  This document has been electronically signed.  JEYSON SANCHEZ M.D., ATTENDING RADIOLOGIST  This document has been electronically signed. Dec  4 2018  5:21PM      < end of copied text >  ---------------------------------------------------------------------------------------------------------------  < from: Xray Chest 1 View AP/PA (11.29.18 @ 14:53) >    EXAM:  XR CHEST AP OR PA 1V                          PROCEDURE DATE:  11/29/2018      INTERPRETATION:  A single chest x-ray was obtained on November 29, 2018.    Indication: Shortness of breath.    Impression:    The heart is normal in size. The lungs are clear. The visualized bones   are normal.    ALEX CUELLAR M.D., ATTENDING RADIOLOGIST  This document has been electronically signed. Nov 29 2018  2:59PM    < end of copied text >  ---------------------------------------------------------------------------------------------------------------  < from: [facility] Duplex Lower Ext Vein Scan, Darius (12.06.18 @ 15:40) >    EXAM:  DUPLEX SCAN EXT VEINS LOWER BI                          PROCEDURE DATE:  12/06/2018      INTERPRETATION:  CLINICAL INFORMATION: Shortness of breath, leg pain and   swelling.    COMPARISON: Bilateral lower extremity venous duplex study dated 1/27/2009.    TECHNIQUE: Duplex sonography of the BILATERAL LOWER extremities with   color and spectral Doppler, with and without compression.      FINDINGS:    There is normal compressibility of the bilateral common femoral, femoral   and popliteal veins. No calf vein thrombosis is detected.    Doppler examination shows normal spontaneous and phasic flow.    IMPRESSION:     No evidence of bilateral lower extremity deep venous thrombosis.    JUSTIN ZAVALETA M.D., ATTENDING RADIOLOGIST  This document has been electronically signed. Dec  6 2018  4:03PM    < end of copied text >  ---------------------------------------------------------------------------------------------------------------  SPIROMETRY:     FEV1 0.56 liters - 22% predicted  FVC 1.73 liters - 52% predicted  FEV1% 32    c/w very severe obstructive lung disease

## 2018-12-11 NOTE — PROGRESS NOTE ADULT - PROBLEM SELECTOR PLAN 1
Continue IV Solumedrol 20mg q6h.  CTA showed no PE, no PNA.  Continue Pulmicort, Duoneb ATC (with transition back to spiriva upon discharge), Theophylline, and Singulair.  Biaxin added by pulmonary.  Echo report noted, mild diastolic dysfunction, no significant changes from prior study in 2014.  Home Trilogy vent arranged by pulmonary.  Stable with minimal improvement clinically; continue current management.  Plan to repeat ABG this AM and based on that, will give one time dose of MS contin 15 mg for dyspnea as per pulmonary recommendations and reassess. Continue IV Solumedrol 20mg q6h.  CTA showed no PE, no PNA.  Continue Pulmicort, Duoneb ATC (with transition back to spiriva upon discharge), Theophylline, and Singulair.  Biaxin added by pulmonary.  Echo report noted, mild diastolic dysfunction, no significant changes from prior study in 2014.  Home Trilogy vent arranged by pulmonary.  Stable with minimal improvement clinically; continue current management.  ABG noted, will give one time dose of MS contin 15 mg for dyspnea as per pulmonary recommendations and reassess.

## 2018-12-12 DIAGNOSIS — K59.04 CHRONIC IDIOPATHIC CONSTIPATION: ICD-10-CM

## 2018-12-12 DIAGNOSIS — N17.9 ACUTE KIDNEY FAILURE, UNSPECIFIED: ICD-10-CM

## 2018-12-12 LAB
ANION GAP SERPL CALC-SCNC: 16 MMOL/L — SIGNIFICANT CHANGE UP (ref 5–17)
BUN SERPL-MCNC: 57 MG/DL — HIGH (ref 7–23)
CALCIUM SERPL-MCNC: 8.8 MG/DL — SIGNIFICANT CHANGE UP (ref 8.4–10.5)
CHLORIDE SERPL-SCNC: 89 MMOL/L — LOW (ref 96–108)
CO2 SERPL-SCNC: 22 MMOL/L — SIGNIFICANT CHANGE UP (ref 22–31)
CREAT SERPL-MCNC: 1.43 MG/DL — HIGH (ref 0.5–1.3)
GLUCOSE BLDC GLUCOMTR-MCNC: 106 MG/DL — HIGH (ref 70–99)
GLUCOSE BLDC GLUCOMTR-MCNC: 109 MG/DL — HIGH (ref 70–99)
GLUCOSE BLDC GLUCOMTR-MCNC: 124 MG/DL — HIGH (ref 70–99)
GLUCOSE BLDC GLUCOMTR-MCNC: 228 MG/DL — HIGH (ref 70–99)
GLUCOSE SERPL-MCNC: 104 MG/DL — HIGH (ref 70–99)
HCT VFR BLD CALC: 40.4 % — SIGNIFICANT CHANGE UP (ref 34.5–45)
HGB BLD-MCNC: 13.8 G/DL — SIGNIFICANT CHANGE UP (ref 11.5–15.5)
MCHC RBC-ENTMCNC: 29.6 PG — SIGNIFICANT CHANGE UP (ref 27–34)
MCHC RBC-ENTMCNC: 34.2 GM/DL — SIGNIFICANT CHANGE UP (ref 32–36)
MCV RBC AUTO: 86.7 FL — SIGNIFICANT CHANGE UP (ref 80–100)
PLATELET # BLD AUTO: 190 K/UL — SIGNIFICANT CHANGE UP (ref 150–400)
POTASSIUM SERPL-MCNC: 6 MMOL/L — HIGH (ref 3.5–5.3)
POTASSIUM SERPL-SCNC: 6 MMOL/L — HIGH (ref 3.5–5.3)
RBC # BLD: 4.66 M/UL — SIGNIFICANT CHANGE UP (ref 3.8–5.2)
RBC # FLD: 13.9 % — SIGNIFICANT CHANGE UP (ref 10.3–14.5)
SODIUM SERPL-SCNC: 127 MMOL/L — LOW (ref 135–145)
THEOPHYLLINE SERPL-MCNC: 11.7 UG/ML — SIGNIFICANT CHANGE UP (ref 10–20)
WBC # BLD: 18.59 K/UL — HIGH (ref 3.8–10.5)
WBC # FLD AUTO: 18.59 K/UL — HIGH (ref 3.8–10.5)

## 2018-12-12 PROCEDURE — 93970 EXTREMITY STUDY: CPT | Mod: 26

## 2018-12-12 PROCEDURE — 99232 SBSQ HOSP IP/OBS MODERATE 35: CPT

## 2018-12-12 PROCEDURE — 99233 SBSQ HOSP IP/OBS HIGH 50: CPT | Mod: GC

## 2018-12-12 PROCEDURE — 99232 SBSQ HOSP IP/OBS MODERATE 35: CPT | Mod: GC

## 2018-12-12 RX ORDER — ONDANSETRON 8 MG/1
4 TABLET, FILM COATED ORAL EVERY 8 HOURS
Qty: 0 | Refills: 0 | Status: DISCONTINUED | OUTPATIENT
Start: 2018-12-12 | End: 2019-01-02

## 2018-12-12 RX ORDER — ALPRAZOLAM 0.25 MG
0.25 TABLET ORAL ONCE
Qty: 0 | Refills: 0 | Status: DISCONTINUED | OUTPATIENT
Start: 2018-12-12 | End: 2018-12-12

## 2018-12-12 RX ORDER — POLYETHYLENE GLYCOL 3350 17 G/17G
17 POWDER, FOR SOLUTION ORAL DAILY
Qty: 0 | Refills: 0 | Status: DISCONTINUED | OUTPATIENT
Start: 2018-12-12 | End: 2019-01-15

## 2018-12-12 RX ORDER — INSULIN GLARGINE 100 [IU]/ML
26 INJECTION, SOLUTION SUBCUTANEOUS AT BEDTIME
Qty: 0 | Refills: 0 | Status: DISCONTINUED | OUTPATIENT
Start: 2018-12-12 | End: 2018-12-12

## 2018-12-12 RX ORDER — INSULIN GLARGINE 100 [IU]/ML
22 INJECTION, SOLUTION SUBCUTANEOUS AT BEDTIME
Qty: 0 | Refills: 0 | Status: DISCONTINUED | OUTPATIENT
Start: 2018-12-12 | End: 2018-12-13

## 2018-12-12 RX ORDER — SALIVA SUBSTITUTE COMB NO.11 351 MG
5 POWDER IN PACKET (EA) MUCOUS MEMBRANE
Qty: 0 | Refills: 0 | Status: DISCONTINUED | OUTPATIENT
Start: 2018-12-12 | End: 2019-01-15

## 2018-12-12 RX ORDER — ONDANSETRON 8 MG/1
4 TABLET, FILM COATED ORAL EVERY 6 HOURS
Qty: 0 | Refills: 0 | Status: DISCONTINUED | OUTPATIENT
Start: 2018-12-12 | End: 2018-12-12

## 2018-12-12 RX ORDER — SODIUM CHLORIDE 9 MG/ML
1000 INJECTION INTRAMUSCULAR; INTRAVENOUS; SUBCUTANEOUS
Qty: 0 | Refills: 0 | Status: DISCONTINUED | OUTPATIENT
Start: 2018-12-12 | End: 2018-12-13

## 2018-12-12 RX ADMIN — Medication 3 MILLILITER(S): at 15:39

## 2018-12-12 RX ADMIN — SENNA PLUS 2 TABLET(S): 8.6 TABLET ORAL at 22:55

## 2018-12-12 RX ADMIN — Medication 400 MILLIGRAM(S): at 12:02

## 2018-12-12 RX ADMIN — ISOSORBIDE MONONITRATE 30 MILLIGRAM(S): 60 TABLET, EXTENDED RELEASE ORAL at 12:03

## 2018-12-12 RX ADMIN — Medication 500000 UNIT(S): at 12:02

## 2018-12-12 RX ADMIN — Medication 100 MILLIGRAM(S): at 12:02

## 2018-12-12 RX ADMIN — Medication 20 MILLIGRAM(S): at 18:22

## 2018-12-12 RX ADMIN — Medication 0.25 MILLIGRAM(S): at 15:39

## 2018-12-12 RX ADMIN — Medication 1 DROP(S): at 22:58

## 2018-12-12 RX ADMIN — Medication 20 MILLIGRAM(S): at 06:50

## 2018-12-12 RX ADMIN — Medication 500000 UNIT(S): at 00:28

## 2018-12-12 RX ADMIN — Medication 1 DROP(S): at 06:49

## 2018-12-12 RX ADMIN — Medication 2000 UNIT(S): at 12:02

## 2018-12-12 RX ADMIN — ENOXAPARIN SODIUM 40 MILLIGRAM(S): 100 INJECTION SUBCUTANEOUS at 22:56

## 2018-12-12 RX ADMIN — ONDANSETRON 4 MILLIGRAM(S): 8 TABLET, FILM COATED ORAL at 12:03

## 2018-12-12 RX ADMIN — Medication 3 MILLILITER(S): at 22:53

## 2018-12-12 RX ADMIN — Medication 20 MILLIGRAM(S): at 12:04

## 2018-12-12 RX ADMIN — Medication 500000 UNIT(S): at 06:49

## 2018-12-12 RX ADMIN — Medication 3 MILLILITER(S): at 11:40

## 2018-12-12 RX ADMIN — Medication 81 MILLIGRAM(S): at 12:02

## 2018-12-12 RX ADMIN — MONTELUKAST 10 MILLIGRAM(S): 4 TABLET, CHEWABLE ORAL at 12:03

## 2018-12-12 RX ADMIN — Medication 20 MILLIGRAM(S): at 00:28

## 2018-12-12 RX ADMIN — Medication 4: at 18:23

## 2018-12-12 RX ADMIN — Medication 3 MILLILITER(S): at 18:22

## 2018-12-12 RX ADMIN — Medication 1 TABLET(S): at 12:02

## 2018-12-12 RX ADMIN — Medication 3 MILLILITER(S): at 06:51

## 2018-12-12 RX ADMIN — Medication 500 MILLIGRAM(S): at 06:50

## 2018-12-12 RX ADMIN — ONDANSETRON 4 MILLIGRAM(S): 8 TABLET, FILM COATED ORAL at 21:06

## 2018-12-12 RX ADMIN — Medication 0.5 MILLIGRAM(S): at 18:22

## 2018-12-12 RX ADMIN — Medication 14 UNIT(S): at 18:23

## 2018-12-12 RX ADMIN — Medication 500000 UNIT(S): at 18:22

## 2018-12-12 RX ADMIN — POLYETHYLENE GLYCOL 3350 17 GRAM(S): 17 POWDER, FOR SOLUTION ORAL at 12:02

## 2018-12-12 RX ADMIN — AMLODIPINE BESYLATE 5 MILLIGRAM(S): 2.5 TABLET ORAL at 06:50

## 2018-12-12 RX ADMIN — INSULIN GLARGINE 22 UNIT(S): 100 INJECTION, SOLUTION SUBCUTANEOUS at 22:57

## 2018-12-12 RX ADMIN — PANTOPRAZOLE SODIUM 40 MILLIGRAM(S): 20 TABLET, DELAYED RELEASE ORAL at 06:50

## 2018-12-12 RX ADMIN — ROSUVASTATIN CALCIUM 10 MILLIGRAM(S): 5 TABLET ORAL at 22:55

## 2018-12-12 NOTE — PROGRESS NOTE ADULT - SUBJECTIVE AND OBJECTIVE BOX
Patient is a 58y old  Female who presents with a chief complaint of dyspnea (12 Dec 2018 09:39)      SUBJECTIVE / OVERNIGHT EVENTS: reports upper extremity swelling, le swelling is better, breathing stable, no abdominal pain, bm 2 days ago     MEDICATIONS  (STANDING):  ALBUTerol/ipratropium for Nebulization 3 milliLiter(s) Nebulizer <User Schedule>  amLODIPine   Tablet 5 milliGRAM(s) Oral daily  artificial tears (preservative free) Ophthalmic Solution 1 Drop(s) Both EYES three times a day  aspirin enteric coated 81 milliGRAM(s) Oral daily  buDESOnide   0.5 milliGRAM(s) Respule 0.5 milliGRAM(s) Inhalation every 12 hours  cholecalciferol 2000 Unit(s) Oral daily  dextrose 5%. 1000 milliLiter(s) (50 mL/Hr) IV Continuous <Continuous>  dextrose 50% Injectable 12.5 Gram(s) IV Push once  dextrose 50% Injectable 25 Gram(s) IV Push once  dextrose 50% Injectable 25 Gram(s) IV Push once  docusate sodium 100 milliGRAM(s) Oral daily  enoxaparin Injectable 40 milliGRAM(s) SubCutaneous every 24 hours  insulin glargine Injectable (LANTUS) 26 Unit(s) SubCutaneous at bedtime  insulin lispro (HumaLOG) corrective regimen sliding scale   SubCutaneous three times a day before meals  insulin lispro (HumaLOG) corrective regimen sliding scale   SubCutaneous at bedtime  insulin lispro Injectable (HumaLOG) 14 Unit(s) SubCutaneous before dinner  insulin lispro Injectable (HumaLOG) 12 Unit(s) SubCutaneous before breakfast  insulin lispro Injectable (HumaLOG) 12 Unit(s) SubCutaneous before lunch  isosorbide   mononitrate ER Tablet (IMDUR) 30 milliGRAM(s) Oral daily  methylPREDNISolone sodium succinate Injectable 20 milliGRAM(s) IV Push every 6 hours  montelukast 10 milliGRAM(s) Oral daily  multivitamin 1 Tablet(s) Oral daily  nystatin    Suspension [id number] Unit(s) Oral four times a day  pantoprazole    Tablet 40 milliGRAM(s) Oral before breakfast  polyethylene glycol 3350 17 Gram(s) Oral daily  rosuvastatin 10 milliGRAM(s) Oral at bedtime  senna 2 Tablet(s) Oral at bedtime  theophylline ER Capsule 400 milliGRAM(s) Oral daily    MEDICATIONS  (PRN):  bisacodyl Suppository 10 milliGRAM(s) Rectal daily PRN Constipation  dextrose 40% Gel 15 Gram(s) Oral once PRN Blood Glucose LESS THAN 70 milliGRAM(s)/deciliter  glucagon  Injectable 1 milliGRAM(s) IntraMuscular once PRN Glucose LESS THAN 70 milligrams/deciliter  ondansetron Injectable 4 milliGRAM(s) IV Push every 8 hours PRN Nausea and/or Vomiting  sodium chloride 0.65% Nasal 1 Spray(s) Both Nostrils two times a day PRN Nasal Congestion        CAPILLARY BLOOD GLUCOSE      POCT Blood Glucose.: 124 mg/dL (12 Dec 2018 11:35)  POCT Blood Glucose.: 106 mg/dL (12 Dec 2018 08:39)  POCT Blood Glucose.: 94 mg/dL (11 Dec 2018 22:08)  POCT Blood Glucose.: 154 mg/dL (11 Dec 2018 17:19)  POCT Blood Glucose.: 134 mg/dL (11 Dec 2018 12:11)    I&O's Summary    11 Dec 2018 07:01  -  12 Dec 2018 07:00  --------------------------------------------------------  IN: 480 mL / OUT: 0 mL / NET: 480 mL    12 Dec 2018 07:01  -  12 Dec 2018 12:02  --------------------------------------------------------  IN: 240 mL / OUT: 0 mL / NET: 240 mL        PHYSICAL EXAM:  GENERAL: NAD, well-developed  HEAD:  Atraumatic, Normocephalic  EYES: conjunctiva and sclera clear  NECK:  No JVD  EXTREMITIES:  +1 edema ue b/l, compression stockings le , edema better   PSYCH: AAOx3  NEUROLOGY: non-focal  SKIN: No rashes or lesions    LABS:                        13.8   18.59 )-----------( 190      ( 12 Dec 2018 07:49 )             40.4     12-12    127<L>  |  89<L>  |  57<H>  ----------------------------<  104<H>  6.0<H>   |  22  |  1.43<H>    Ca    8.8      12 Dec 2018 07:02  Phos  3.8     12-11  Mg     2.4     12-11                RADIOLOGY & ADDITIONAL TESTS:    Imaging Personally Reviewed:    Consultant(s) Notes Reviewed:  pulm     Care Discussed with Consultants/Other Providers: renal , pulmonary, endocrine

## 2018-12-12 NOTE — PROGRESS NOTE ADULT - PROBLEM SELECTOR PLAN 3
A1C 7.2, but with hyperglycemia in setting of Solumedrol use.  Increased Lantus to 26 units and changed humalog to 12-12-14.  Continue medium dose SSI.  Continue to monitor blood sugars.  Endocrine recs appreciated.

## 2018-12-12 NOTE — PROGRESS NOTE ADULT - PROBLEM SELECTOR PLAN 1
Continue IV Solumedrol 20mg q6h.  CTA showed no PE, no PNA.  Continue Pulmicort, Duoneb ATC (with transition back to spiriva upon discharge), Theophylline, and Singulair.  Completed 7 days of biaxin   Echo report noted, mild diastolic dysfunction, no significant changes from prior study in 2014.  Home Trilogy vent arranged by pulmonary.  Stable with minimal improvement clinically; continue current management.  D/w pulmonary today, dc ms contin 2/2 [place], symptoms of nausea and sedation, will monitor pt off opiates for now

## 2018-12-12 NOTE — PROGRESS NOTE ADULT - SUBJECTIVE AND OBJECTIVE BOX
Diabetes Follow up note: Saw pt earlier today  Interval Hx: 57 y/o F w/h/o controlled T2DM on Metformin 500mg bid. Also h/o CAD and COPD. Here with COPD exacerbation on Methylprednisolone> remains on 20mg q6h. Reports tolerating POs with erratic PO intake since pt dislikes hospital food and eats at different hours during the day. Glycemic control improved while on present insulin doses. Pt respiratory condition unchanged per pt. No hypoglycemia.      Review of Systems:  General: as above  CV: No CP  GI: Tolerating POs without any N/V/D/ABD PAIN.  Resp: NIELSON on and off.   ENDO: No S&Sx of hypoglycemia      MEDS:  insulin glargine Injectable (LANTUS) 26 Unit(s) SubCutaneous at bedtime  insulin lispro (HumaLOG) corrective regimen sliding scale   SubCutaneous three times a day before meals  insulin lispro (HumaLOG) corrective regimen sliding scale   SubCutaneous at bedtime  insulin lispro Injectable (HumaLOG) 14 Unit(s) SubCutaneous before dinner  insulin lispro Injectable (HumaLOG) 12 Unit(s) SubCutaneous before breakfast  insulin lispro Injectable (HumaLOG) 12 Unit(s) SubCutaneous before lunch  methylPREDNISolone sodium succinate Injectable 20 milliGRAM(s) IV Push every 6 hours  rosuvastatin 10 milliGRAM(s) Oral at bedtime  clarithromycin 500 milliGRAM(s) Oral two times a day  theophylline ER Capsule 400 milliGRAM(s) Oral daily  cholecalciferol 2000 Unit(s) Oral daily      MEDICATIONS  (PRN):  bisacodyl Suppository 10 milliGRAM(s) Rectal daily PRN Constipation  dextrose 40% Gel 15 Gram(s) Oral once PRN Blood Glucose LESS THAN 70 milliGRAM(s)/deciliter  glucagon  Injectable 1 milliGRAM(s) IntraMuscular once PRN Glucose LESS THAN 70 milligrams/deciliter  ondansetron Injectable 4 milliGRAM(s) IV Push every 8 hours PRN Nausea and/or Vomiting  sodium chloride 0.65% Nasal 1 Spray(s) Both Nostrils two times a day PRN Nasal Congestion      Allergies    No Known Allergies      PE:  General: Female lying in bed. On O2. Primary team rounding at time of visit   Vital Signs Last 24 Hrs  T(C): 36.3 (12-12-18 @ 12:02), Max: 36.6 (12-11-18 @ 19:51)  T(F): 97.4 (12-12-18 @ 12:02), Max: 97.8 (12-11-18 @ 19:51)  HR: 86 (12-12-18 @ 12:02) (86 - 98)  BP: 134/74 (12-12-18 @ 12:02) (101/63 - 134/74)  BP(mean): --  RR: 18 (12-12-18 @ 12:02) (18 - 20)  SpO2: 94% (12-12-18 @ 12:02) (94% - 99%)  Abd: Soft, NT, ND, Obese.   Extremities: Warm. B/L trace edema improved. TOMAS stockings in place.   Neuro: A&O X3    LABS:  POCT Blood Glucose.: 124 mg/dL (12-12-18 @ 11:35)  POCT Blood Glucose.: 106 mg/dL (12-12-18 @ 08:39)  POCT Blood Glucose.: 94 mg/dL (12-11-18 @ 22:08)  POCT Blood Glucose.: 154 mg/dL (12-11-18 @ 17:19)  POCT Blood Glucose.: 134 mg/dL (12-11-18 @ 12:11)  POCT Blood Glucose.: 136 mg/dL (12-11-18 @ 08:17)  POCT Blood Glucose.: 116 mg/dL (12-10-18 @ 22:12)  POCT Blood Glucose.: 125 mg/dL (12-10-18 @ 19:41)  POCT Blood Glucose.: 87 mg/dL (12-10-18 @ 17:20)                           13.8   18.59 )-----------( 190      ( 12 Dec 2018 07:49 )             40.4                        14.5   14.6  )-----------( 236      ( 11 Dec 2018 09:49 )             44.1               12-12    127<L>  |  89<L>  |  57<H>  ----------------------------<  104<H>  6.0<H>   |  22  |  1.43<H>    Ca    8.8      12 Dec 2018 07:02  Phos  3.8     12-11  Mg     2.4     12-11 12-11    136  |  92<L>  |  46<H>  ----------------------------<  133<H>  5.4<H>   |  34<H>  |  1.22    Ca    9.4      11 Dec 2018 09:46  Phos  3.8     12-11  Mg     2.4     12-11        Hemoglobin A1C, Whole Blood: 7.2 % <H> [4.0 - 5.6] (11-30-18 @ 07:58)

## 2018-12-12 NOTE — PROGRESS NOTE ADULT - ASSESSMENT
ASSESSMENT:    ongoing dyspnea with minimal exertion with chronic hypoxic and hypercapnic respiratory failure due to very severe COPD with "exacerbation" - adequate oxygenation on a nasal canula in the setting of profound dyspnea suggests that alterations in the mechanics of breathing due to lung hyperinflation are playing a significant role in shortness of breath - there is evidence of CAD without pulmonary hypertension on the CT scan which is without pulmonary emboli - ECHO has a preserved LVEF, no significant valvular heart disease and no pulmonary hypertension - would not use diamox to treat the patient's metabolic alkalosis which is compensatory for her chronic respiratory acidosis - she will unlikely be able to increase her minute ventilation to prevent worsening acidemia     HTN/HLD/DM    CAD s/p PCI    PAD    extremity swelling likely related to steroid induced fluid retention and nocturnal hypoxemia - no evidence of DVT on lower extremity Duplex examination    PLAN/RECOMMENDATIONS:    oxygen supplementation to keep saturation greater than 92%   ABG with compensated chronic hypercapnia  would start MS contin 15mg 2 times daily if pH is ~ 7.4 watching closely for confusion, sedation and CO2 retention.  may need to alter dose if pt does not tolerate.  she is currently awake, alert, conversant   sputum culture - no growth - complete a 5 day course of biaxin  home trilogy vent has been arranged with [facility] - nocturnal BIPAP 12/5 with 40% FiO2  albuterol/atrovent nebs q4h holding 2AM dose  pulmicort 0.5mg nebs q12h  singulair/theophylline - check theophylline level  continue solumedrol to 20mg IVP q6h - glucose control on high dose steroids - nystatin  cardiac meds: ASA/crestor/imdur/norvasc  cardiology evaluation - ECHO with preserved LVEF, no evidence of valvular heart disease and no evidence of right ventricular dysfunction despite chronic hypoxemia  DVT prophylaxis - SQ lovenox  thigh high TOMAS stockings - change to small  daily physical therapy - needs to walk multiple times a day as able  continue outpatient pulmonary rehabilitation  outpatient evaluation for lung transplantation ongoing    Will follow with you. Plan of care discussed with the patient at bedside and nursing She will take many months to return to close to her baseline respiratory status following this exacerbation.    Julieth Gan MD, [facility] - [phone]  Pulmonary Medicine

## 2018-12-12 NOTE — PROGRESS NOTE ADULT - SUBJECTIVE AND OBJECTIVE BOX
Follow-up Pulm Progress Note    No new respiratory events overnight.  Denies increased SOB, chest pain, cough or mucus.  slept better with use of morphine, but feels "a little tired this am"    Medications:  MEDICATIONS  (STANDING):  ALBUTerol/ipratropium for Nebulization 3 milliLiter(s) Nebulizer <User Schedule>  amLODIPine   Tablet 5 milliGRAM(s) Oral daily  artificial tears (preservative free) Ophthalmic Solution 1 Drop(s) Both EYES three times a day  aspirin enteric coated 81 milliGRAM(s) Oral daily  buDESOnide   0.5 milliGRAM(s) Respule 0.5 milliGRAM(s) Inhalation every 12 hours  cholecalciferol 2000 Unit(s) Oral daily  clarithromycin 500 milliGRAM(s) Oral two times a day  dextrose 5%. 1000 milliLiter(s) (50 mL/Hr) IV Continuous <Continuous>  dextrose 50% Injectable 12.5 Gram(s) IV Push once  dextrose 50% Injectable 25 Gram(s) IV Push once  dextrose 50% Injectable 25 Gram(s) IV Push once  docusate sodium 100 milliGRAM(s) Oral daily  enoxaparin Injectable 40 milliGRAM(s) SubCutaneous every 24 hours  insulin glargine Injectable (LANTUS) 26 Unit(s) SubCutaneous at bedtime  insulin lispro (HumaLOG) corrective regimen sliding scale   SubCutaneous three times a day before meals  insulin lispro (HumaLOG) corrective regimen sliding scale   SubCutaneous at bedtime  insulin lispro Injectable (HumaLOG) 14 Unit(s) SubCutaneous before dinner  insulin lispro Injectable (HumaLOG) 12 Unit(s) SubCutaneous before breakfast  insulin lispro Injectable (HumaLOG) 12 Unit(s) SubCutaneous before lunch  isosorbide   mononitrate ER Tablet (IMDUR) 30 milliGRAM(s) Oral daily  methylPREDNISolone sodium succinate Injectable 20 milliGRAM(s) IV Push every 6 hours  montelukast 10 milliGRAM(s) Oral daily  morphine ER Tablet 15 milliGRAM(s) Oral <User Schedule>  multivitamin 1 Tablet(s) Oral daily  nystatin    Suspension 930731 Unit(s) Oral four times a day  pantoprazole    Tablet 40 milliGRAM(s) Oral before breakfast  rosuvastatin 10 milliGRAM(s) Oral at bedtime  senna 2 Tablet(s) Oral at bedtime  theophylline ER Capsule 400 milliGRAM(s) Oral daily    MEDICATIONS  (PRN):  bisacodyl Suppository 10 milliGRAM(s) Rectal daily PRN Constipation  dextrose 40% Gel 15 Gram(s) Oral once PRN Blood Glucose LESS THAN 70 milliGRAM(s)/deciliter  glucagon  Injectable 1 milliGRAM(s) IntraMuscular once PRN Glucose LESS THAN 70 milligrams/deciliter  sodium chloride 0.65% Nasal 1 Spray(s) Both Nostrils two times a day PRN Nasal Congestion      Vent settings (if applicable)      Vital Signs Last 24 Hrs  T(C): 36.5 (12 Dec 2018 06:43), Max: 36.6 (11 Dec 2018 19:51)  T(F): 97.7 (12 Dec 2018 06:43), Max: 97.8 (11 Dec 2018 19:51)  HR: 87 (12 Dec 2018 06:43) (87 - 98)  BP: 131/79 (12 Dec 2018 06:43) (101/63 - 131/79)  BP(mean): --  RR: 18 (12 Dec 2018 06:43) (18 - 20)  SpO2: 96% (12 Dec 2018 06:43) (96% - 100%)    ABG - ( 11 Dec 2018 11:45 )  pH, Arterial: 7.39  pH, Blood: x     /  pCO2: 54    /  pO2: 98    / HCO3: 32    / Base Excess: 6.2   /  SaO2: 98                    12-11 @ 07:01  -  12-12 @ 07:00  --------------------------------------------------------  IN: 480 mL / OUT: 0 mL / NET: 480 mL          LABS:                        13.8   18.59 )-----------( 190      ( 12 Dec 2018 07:49 )             40.4     12-12    127<L>  |  89<L>  |  57<H>  ----------------------------<  104<H>  6.0<H>   |  22  |  1.43<H>    Ca    8.8      12 Dec 2018 07:02  Phos  3.8     12-11  Mg     2.4     12-11            CAPILLARY BLOOD GLUCOSE      POCT Blood Glucose.: 106 mg/dL (12 Dec 2018 08:39)              CULTURES:  Culture Results:   Normal Respiratory Samaria present (12-07 @ 08:34)  Culture Results:   Normal Respiratory Samaria present (12-05 @ 22:50)        Physical Examination:  Awake and alert, generally comfortable, anxious  HEENT: unremarkable  PULM: decreased to auscultation bilaterally, no significant sputum production  CVS: Regular rate and rhythm, no murmurs, rubs, or gallops  Abd:  soft, non tender  Extrem: No CCE    RADIOLOGY REVIEWED  CXR:    CT chest:

## 2018-12-12 NOTE — PROGRESS NOTE ADULT - ASSESSMENT
59 y/o F w/h/o controlled T2DM on Metformin 500mg bid. Also h/o CAD and COPD. Here with COPD exacerbation on Methylprednisolone 20 mg q6h and rebound hyperglycemia. Variable PO intake with BG levels now at goal while on present insulin doses. However, noted increased on creat levels so will preventively decrease lantus dose. Alos noted increase on WBC but pt has no s&sx of infection at this time. No hypoglycemia. BG goal (100-200mg/dl).

## 2018-12-12 NOTE — PROGRESS NOTE ADULT - PROBLEM SELECTOR PLAN 1
in setting of ARB use and hyperglycemia, most likely due to low elida/renin state. Was taken off Benicar, and switched to Norvasc and K improved. Has been on steroids- less likely any adrenal insufficiency. Now hyperkalemic again, with hyponatremia and AURORA. Pt may be retaining in setting of morphine use.   - Concern for obstruction - obtain bladder sonogram to r/o retention.   - continue to control blood glucose.   - Low K diet.  - Monitor K daily.  - Avoid ACEi/ARB therapy.

## 2018-12-12 NOTE — PROGRESS NOTE ADULT - PROBLEM SELECTOR PLAN 6
Dopplers negative for DVT.  Echo report noted, mild diastolic dysfunction, no significant changes from prior study in 2014.  Suspect leg edema may be related to recent high dose steroids.  C/w compression stockings   Need to monitor for any worsening leg edema with initiation of Norvasc.  Pt with b/l UE edema likely in the setting of steroid use, will check dopplers r/o dvt

## 2018-12-12 NOTE — PROGRESS NOTE ADULT - PROBLEM SELECTOR PLAN 4
Pt with increase in cr today   check bladder scan   check ua, urine goldie solares ms contin  d/w renal appreciate recs

## 2018-12-12 NOTE — PROGRESS NOTE ADULT - PROBLEM SELECTOR PLAN 1
-test BG AC/HS  -Decrease Lantus dsoe to 22 units QHS  -C/w  Humalog 12-12-14 ac meals for now.   -c/w Humalog moderate correction scale AC and Mod HS scale  -Please notify endocrine when steroids further tapered. Will need adjustment to insulin regimen.  -Plan discussed with pt/team.  Contact info: [phone] (24/7). pager [phone]

## 2018-12-12 NOTE — PROGRESS NOTE ADULT - PROBLEM SELECTOR PLAN 5
Thought to be due to secondary to Benicar and hyperglycemia.  Benicar has been discontinued.  Continue Norvasc 5 mg daily.  Glycemic control as above.  Pt with metabolic alk 2/2 chronic resp acidosis   s/p diamox and kayxelate 12/8   d/w renal appreciate recs

## 2018-12-12 NOTE — PROGRESS NOTE ADULT - PROBLEM SELECTOR PLAN 8
Has BM every other day at home at baseline.  Currently on senna, colace, miralax. Given one time dose of dulcolax suppository with good results pt had bm yesterday

## 2018-12-12 NOTE — PROGRESS NOTE ADULT - SUBJECTIVE AND OBJECTIVE BOX
[facility] DIVISION OF KIDNEY DISEASES AND HYPERTENSION -- FOLLOW UP NOTE  --------------------------------------------------------------------------------  Chief Complaint:  hyperkalemia    24 hour events/subjective:  Pt feeling constipated, tired, and frustrated. Still some SOB. Says she is urinating. Has been receiving morphine which has helped with her breathing.      PAST HISTORY  --------------------------------------------------------------------------------  No significant changes to PMH, PSH, FHx, SHx, unless otherwise noted    ALLERGIES & MEDICATIONS  --------------------------------------------------------------------------------  Allergies    No Known Allergies    Intolerances      Standing Inpatient Medications  ALBUTerol/ipratropium for Nebulization 3 milliLiter(s) Nebulizer <User Schedule>  amLODIPine   Tablet 5 milliGRAM(s) Oral daily  artificial tears (preservative free) Ophthalmic Solution 1 Drop(s) Both EYES three times a day  aspirin enteric coated 81 milliGRAM(s) Oral daily  buDESOnide   0.5 milliGRAM(s) Respule 0.5 milliGRAM(s) Inhalation every 12 hours  cholecalciferol 2000 Unit(s) Oral daily  dextrose 5%. 1000 milliLiter(s) IV Continuous <Continuous>  dextrose 50% Injectable 12.5 Gram(s) IV Push once  dextrose 50% Injectable 25 Gram(s) IV Push once  dextrose 50% Injectable 25 Gram(s) IV Push once  docusate sodium 100 milliGRAM(s) Oral daily  enoxaparin Injectable 40 milliGRAM(s) SubCutaneous every 24 hours  insulin glargine Injectable (LANTUS) 26 Unit(s) SubCutaneous at bedtime  insulin lispro (HumaLOG) corrective regimen sliding scale   SubCutaneous three times a day before meals  insulin lispro (HumaLOG) corrective regimen sliding scale   SubCutaneous at bedtime  insulin lispro Injectable (HumaLOG) 14 Unit(s) SubCutaneous before dinner  insulin lispro Injectable (HumaLOG) 12 Unit(s) SubCutaneous before breakfast  insulin lispro Injectable (HumaLOG) 12 Unit(s) SubCutaneous before lunch  isosorbide   mononitrate ER Tablet (IMDUR) 30 milliGRAM(s) Oral daily  methylPREDNISolone sodium succinate Injectable 20 milliGRAM(s) IV Push every 6 hours  montelukast 10 milliGRAM(s) Oral daily  multivitamin 1 Tablet(s) Oral daily  nystatin    Suspension 121664 Unit(s) Oral four times a day  pantoprazole    Tablet 40 milliGRAM(s) Oral before breakfast  polyethylene glycol 3350 17 Gram(s) Oral daily  rosuvastatin 10 milliGRAM(s) Oral at bedtime  senna 2 Tablet(s) Oral at bedtime  theophylline ER Capsule 400 milliGRAM(s) Oral daily    PRN Inpatient Medications  bisacodyl Suppository 10 milliGRAM(s) Rectal daily PRN  dextrose 40% Gel 15 Gram(s) Oral once PRN  glucagon  Injectable 1 milliGRAM(s) IntraMuscular once PRN  ondansetron Injectable 4 milliGRAM(s) IV Push every 8 hours PRN  sodium chloride 0.65% Nasal 1 Spray(s) Both Nostrils two times a day PRN      REVIEW OF SYSTEMS  --------------------------------------------------------------------------------  Gen: + fatigue  Skin: No rashes  Respiratory: + dyspnea  CV: No chest pain  GI: No abdominal pain, diarrhea, +constipation  : No dysuria, hematuria  MSK: + edema  Neuro: no confusion    VITALS/PHYSICAL EXAM  --------------------------------------------------------------------------------  T(C): 36.5 (12-12-18 @ 06:43), Max: 36.6 (12-11-18 @ 19:51)  HR: 87 (12-12-18 @ 06:43) (87 - 98)  BP: 131/79 (12-12-18 @ 06:43) (101/63 - 131/79)  RR: 18 (12-12-18 @ 06:43) (18 - 20)  SpO2: 96% (12-12-18 @ 06:43) (96% - 100%)  Wt(kg): --        12-11-18 @ 07:01  -  12-12-18 @ 07:00  --------------------------------------------------------  IN: 480 mL / OUT: 0 mL / NET: 480 mL    12-12-18 @ 07:01  -  12-12-18 @ 11:57  --------------------------------------------------------  IN: 240 mL / OUT: 0 mL / NET: 240 mL      Physical Exam:  	Gen: NAD, obese female  	Pulm: decreased air entry b/l   	CV: RRR, S1S2; no rub  	Abd: +BS, soft, nontender/nondistended  	LE: Warm, mild b/l LE edema  	Neuro: follows commands  	Psych: appears frustrated  	Skin: Warm, without rashes    LABS/STUDIES  --------------------------------------------------------------------------------              13.8   18.59 >-----------<  190      [12-12-18 @ 07:49]              40.4     127  |  89  |  57  ----------------------------<  104      [12-12-18 @ 07:02]  6.0   |  22  |  1.43        Ca     8.8     [12-12-18 @ 07:02]      Mg     2.4     [12-11-18 @ 09:46]      Phos  3.8     [12-11-18 @ 09:46]            Creatinine Trend:  SCr 1.43 [12-12 @ 07:02]  SCr 1.22 [12-11 @ 09:46]  SCr 1.17 [12-10 @ 06:21]  SCr 1.01 [12-09 @ 07:49]  SCr 1.09 [12-08 @ 22:29]        HbA1c 7.2      [11-30-18 @ 07:58] [facility] DIVISION OF KIDNEY DISEASES AND HYPERTENSION -- FOLLOW UP NOTE  --------------------------------------------------------------------------------  Chief Complaint:  hyperkalemia    24 hour events/subjective:  Pt feeling constipated, tired, and frustrated. Still some SOB. Says she is urinating. Has been receiving morphine which has helped with her breathing.      PAST HISTORY  --------------------------------------------------------------------------------  No significant changes to PMH, PSH, FHx, SHx, unless otherwise noted    ALLERGIES & MEDICATIONS  --------------------------------------------------------------------------------  Allergies    No Known Allergies    Intolerances      Standing Inpatient Medications  ALBUTerol/ipratropium for Nebulization 3 milliLiter(s) Nebulizer <User Schedule>  amLODIPine   Tablet 5 milliGRAM(s) Oral daily  artificial tears (preservative free) Ophthalmic Solution 1 Drop(s) Both EYES three times a day  aspirin enteric coated 81 milliGRAM(s) Oral daily  buDESOnide   0.5 milliGRAM(s) Respule 0.5 milliGRAM(s) Inhalation every 12 hours  cholecalciferol 2000 Unit(s) Oral daily  dextrose 5%. 1000 milliLiter(s) IV Continuous <Continuous>  dextrose 50% Injectable 12.5 Gram(s) IV Push once  dextrose 50% Injectable 25 Gram(s) IV Push once  dextrose 50% Injectable 25 Gram(s) IV Push once  docusate sodium 100 milliGRAM(s) Oral daily  enoxaparin Injectable 40 milliGRAM(s) SubCutaneous every 24 hours  insulin glargine Injectable (LANTUS) 26 Unit(s) SubCutaneous at bedtime  insulin lispro (HumaLOG) corrective regimen sliding scale   SubCutaneous three times a day before meals  insulin lispro (HumaLOG) corrective regimen sliding scale   SubCutaneous at bedtime  insulin lispro Injectable (HumaLOG) 14 Unit(s) SubCutaneous before dinner  insulin lispro Injectable (HumaLOG) 12 Unit(s) SubCutaneous before breakfast  insulin lispro Injectable (HumaLOG) 12 Unit(s) SubCutaneous before lunch  isosorbide   mononitrate ER Tablet (IMDUR) 30 milliGRAM(s) Oral daily  methylPREDNISolone sodium succinate Injectable 20 milliGRAM(s) IV Push every 6 hours  montelukast 10 milliGRAM(s) Oral daily  multivitamin 1 Tablet(s) Oral daily  nystatin    Suspension 721625 Unit(s) Oral four times a day  pantoprazole    Tablet 40 milliGRAM(s) Oral before breakfast  polyethylene glycol 3350 17 Gram(s) Oral daily  rosuvastatin 10 milliGRAM(s) Oral at bedtime  senna 2 Tablet(s) Oral at bedtime  theophylline ER Capsule 400 milliGRAM(s) Oral daily    PRN Inpatient Medications  bisacodyl Suppository 10 milliGRAM(s) Rectal daily PRN  dextrose 40% Gel 15 Gram(s) Oral once PRN  glucagon  Injectable 1 milliGRAM(s) IntraMuscular once PRN  ondansetron Injectable 4 milliGRAM(s) IV Push every 8 hours PRN  sodium chloride 0.65% Nasal 1 Spray(s) Both Nostrils two times a day PRN      REVIEW OF SYSTEMS  --------------------------------------------------------------------------------  Gen: + fatigue  Skin: No rashes  Respiratory: + dyspnea  CV: No chest pain  GI: No abdominal pain, diarrhea, +constipation  : No dysuria, hematuria  MSK: + edema  Neuro: no confusion    VITALS/PHYSICAL EXAM  --------------------------------------------------------------------------------  T(C): 36.5 (12-12-18 @ 06:43), Max: 36.6 (12-11-18 @ 19:51)  HR: 87 (12-12-18 @ 06:43) (87 - 98)  BP: 131/79 (12-12-18 @ 06:43) (101/63 - 131/79)  RR: 18 (12-12-18 @ 06:43) (18 - 20)  SpO2: 96% (12-12-18 @ 06:43) (96% - 100%)  Wt(kg): --        12-11-18 @ 07:01  -  12-12-18 @ 07:00  --------------------------------------------------------  IN: 480 mL / OUT: 0 mL / NET: 480 mL    12-12-18 @ 07:01  -  12-12-18 @ 11:57  --------------------------------------------------------  IN: 240 mL / OUT: 0 mL / NET: 240 mL      Physical Exam:  	Gen: NAD, obese female  	Pulm: decreased air entry b/l   	CV: RRR, S1S2; no rub  	Abd: +BS, soft, nontender/nondistended  	LE: Warm, mild b/l LE edema  	Neuro: follows commands  	Psych: appears frustrated  	Skin: Warm, without rashes    LABS/STUDIES  --------------------------------------------------------------------------------              13.8   18.59 >-----------<  190      [12-12-18 @ 07:49]              40.4     127  |  89  |  57  ----------------------------<  104      [12-12-18 @ 07:02]  6.0   |  22  |  1.43        Ca     8.8     [12-12-18 @ 07:02]      Mg     2.4     [12-11-18 @ 09:46]      Phos  3.8     [12-11-18 @ 09:46]    Creatinine Trend:  SCr 1.43 [12-12 @ 07:02]  SCr 1.22 [12-11 @ 09:46]  SCr 1.17 [12-10 @ 06:21]  SCr 1.01 [12-09 @ 07:49]  SCr 1.09 [12-08 @ 22:29]  HbA1c 7.2      [11-30-18 @ 07:58]

## 2018-12-12 NOTE — PROGRESS NOTE ADULT - ASSESSMENT
58yoF w/ advanced COPD on home O2 3L (being evaluated at [facility] for lung transplant), HTN, HLD, CAD s/p 6 stents, DMII on metformin, PVD, who p/w progressive worsening dyspnea x 3 weeks c/w COPD exacerbation. Renal now called for persistent hyperkalemia.

## 2018-12-13 DIAGNOSIS — N17.9 ACUTE KIDNEY FAILURE, UNSPECIFIED: ICD-10-CM

## 2018-12-13 LAB
ANION GAP SERPL CALC-SCNC: 11 MMOL/L — SIGNIFICANT CHANGE UP (ref 5–17)
ANION GAP SERPL CALC-SCNC: 14 MMOL/L — SIGNIFICANT CHANGE UP (ref 5–17)
APPEARANCE UR: CLEAR — SIGNIFICANT CHANGE UP
BILIRUB UR-MCNC: NEGATIVE — SIGNIFICANT CHANGE UP
BUN SERPL-MCNC: 60 MG/DL — HIGH (ref 7–23)
BUN SERPL-MCNC: 66 MG/DL — HIGH (ref 7–23)
CALCIUM SERPL-MCNC: 8.9 MG/DL — SIGNIFICANT CHANGE UP (ref 8.4–10.5)
CALCIUM SERPL-MCNC: 9.7 MG/DL — SIGNIFICANT CHANGE UP (ref 8.4–10.5)
CHLORIDE SERPL-SCNC: 86 MMOL/L — LOW (ref 96–108)
CHLORIDE SERPL-SCNC: 89 MMOL/L — LOW (ref 96–108)
CO2 SERPL-SCNC: 28 MMOL/L — SIGNIFICANT CHANGE UP (ref 22–31)
CO2 SERPL-SCNC: 30 MMOL/L — SIGNIFICANT CHANGE UP (ref 22–31)
COLOR SPEC: YELLOW — SIGNIFICANT CHANGE UP
CREAT ?TM UR-MCNC: 76 MG/DL — SIGNIFICANT CHANGE UP
CREAT SERPL-MCNC: 1.56 MG/DL — HIGH (ref 0.5–1.3)
CREAT SERPL-MCNC: 1.82 MG/DL — HIGH (ref 0.5–1.3)
DIFF PNL FLD: NEGATIVE — SIGNIFICANT CHANGE UP
GAS PNL BLDA: SIGNIFICANT CHANGE UP
GAS PNL BLDA: SIGNIFICANT CHANGE UP
GLUCOSE BLDC GLUCOMTR-MCNC: 110 MG/DL — HIGH (ref 70–99)
GLUCOSE BLDC GLUCOMTR-MCNC: 144 MG/DL — HIGH (ref 70–99)
GLUCOSE BLDC GLUCOMTR-MCNC: 83 MG/DL — SIGNIFICANT CHANGE UP (ref 70–99)
GLUCOSE BLDC GLUCOMTR-MCNC: 88 MG/DL — SIGNIFICANT CHANGE UP (ref 70–99)
GLUCOSE SERPL-MCNC: 108 MG/DL — HIGH (ref 70–99)
GLUCOSE SERPL-MCNC: 80 MG/DL — SIGNIFICANT CHANGE UP (ref 70–99)
GLUCOSE UR QL: NEGATIVE MG/DL — SIGNIFICANT CHANGE UP
HCT VFR BLD CALC: 42.3 % — SIGNIFICANT CHANGE UP (ref 34.5–45)
HGB BLD-MCNC: 14.2 G/DL — SIGNIFICANT CHANGE UP (ref 11.5–15.5)
KETONES UR-MCNC: NEGATIVE — SIGNIFICANT CHANGE UP
LEUKOCYTE ESTERASE UR-ACNC: NEGATIVE — SIGNIFICANT CHANGE UP
MAGNESIUM SERPL-MCNC: 2.3 MG/DL — SIGNIFICANT CHANGE UP (ref 1.6–2.6)
MCHC RBC-ENTMCNC: 29 PG — SIGNIFICANT CHANGE UP (ref 27–34)
MCHC RBC-ENTMCNC: 33.7 GM/DL — SIGNIFICANT CHANGE UP (ref 32–36)
MCV RBC AUTO: 86 FL — SIGNIFICANT CHANGE UP (ref 80–100)
NITRITE UR-MCNC: NEGATIVE — SIGNIFICANT CHANGE UP
NT-PROBNP SERPL-SCNC: 254 PG/ML — SIGNIFICANT CHANGE UP (ref 0–300)
PH UR: 5.5 — SIGNIFICANT CHANGE UP (ref 5–8)
PHOSPHATE SERPL-MCNC: 4.1 MG/DL — SIGNIFICANT CHANGE UP (ref 2.5–4.5)
PLATELET # BLD AUTO: 183 K/UL — SIGNIFICANT CHANGE UP (ref 150–400)
POTASSIUM SERPL-MCNC: 5.8 MMOL/L — HIGH (ref 3.5–5.3)
POTASSIUM SERPL-MCNC: 6.1 MMOL/L — HIGH (ref 3.5–5.3)
POTASSIUM SERPL-SCNC: 5.8 MMOL/L — HIGH (ref 3.5–5.3)
POTASSIUM SERPL-SCNC: 6.1 MMOL/L — HIGH (ref 3.5–5.3)
POTASSIUM UR-SCNC: 50 MMOL/L — SIGNIFICANT CHANGE UP
PROT UR-MCNC: NEGATIVE MG/DL — SIGNIFICANT CHANGE UP
RBC # BLD: 4.92 M/UL — SIGNIFICANT CHANGE UP (ref 3.8–5.2)
RBC # FLD: 13.4 % — SIGNIFICANT CHANGE UP (ref 10.3–14.5)
SODIUM SERPL-SCNC: 128 MMOL/L — LOW (ref 135–145)
SODIUM SERPL-SCNC: 130 MMOL/L — LOW (ref 135–145)
SODIUM UR-SCNC: 20 MMOL/L — SIGNIFICANT CHANGE UP
SP GR SPEC: 1.02 — SIGNIFICANT CHANGE UP (ref 1.01–1.02)
UROBILINOGEN FLD QL: NEGATIVE MG/DL — SIGNIFICANT CHANGE UP
WBC # BLD: 16.5 K/UL — HIGH (ref 3.8–10.5)
WBC # FLD AUTO: 16.5 K/UL — HIGH (ref 3.8–10.5)

## 2018-12-13 PROCEDURE — 99233 SBSQ HOSP IP/OBS HIGH 50: CPT | Mod: GC

## 2018-12-13 PROCEDURE — 99232 SBSQ HOSP IP/OBS MODERATE 35: CPT

## 2018-12-13 PROCEDURE — 99233 SBSQ HOSP IP/OBS HIGH 50: CPT

## 2018-12-13 PROCEDURE — 71045 X-RAY EXAM CHEST 1 VIEW: CPT | Mod: 26

## 2018-12-13 RX ORDER — SODIUM POLYSTYRENE SULFONATE 4.1 MEQ/G
15 POWDER, FOR SUSPENSION ORAL ONCE
Qty: 0 | Refills: 0 | Status: COMPLETED | OUTPATIENT
Start: 2018-12-13 | End: 2018-12-13

## 2018-12-13 RX ORDER — INSULIN LISPRO 100/ML
12 VIAL (ML) SUBCUTANEOUS
Qty: 0 | Refills: 0 | Status: DISCONTINUED | OUTPATIENT
Start: 2018-12-13 | End: 2018-12-15

## 2018-12-13 RX ORDER — INSULIN LISPRO 100/ML
6 VIAL (ML) SUBCUTANEOUS ONCE
Qty: 0 | Refills: 0 | Status: COMPLETED | OUTPATIENT
Start: 2018-12-13 | End: 2018-12-13

## 2018-12-13 RX ORDER — FUROSEMIDE 40 MG
40 TABLET ORAL ONCE
Qty: 0 | Refills: 0 | Status: COMPLETED | OUTPATIENT
Start: 2018-12-13 | End: 2018-12-13

## 2018-12-13 RX ORDER — LANOLIN ALCOHOL/MO/W.PET/CERES
3 CREAM (GRAM) TOPICAL AT BEDTIME
Qty: 0 | Refills: 0 | Status: DISCONTINUED | OUTPATIENT
Start: 2018-12-13 | End: 2018-12-14

## 2018-12-13 RX ORDER — INSULIN LISPRO 100/ML
10 VIAL (ML) SUBCUTANEOUS
Qty: 0 | Refills: 0 | Status: DISCONTINUED | OUTPATIENT
Start: 2018-12-13 | End: 2018-12-15

## 2018-12-13 RX ORDER — INSULIN GLARGINE 100 [IU]/ML
20 INJECTION, SOLUTION SUBCUTANEOUS AT BEDTIME
Qty: 0 | Refills: 0 | Status: DISCONTINUED | OUTPATIENT
Start: 2018-12-13 | End: 2018-12-14

## 2018-12-13 RX ORDER — INSULIN GLARGINE 100 [IU]/ML
16 INJECTION, SOLUTION SUBCUTANEOUS ONCE
Qty: 0 | Refills: 0 | Status: COMPLETED | OUTPATIENT
Start: 2018-12-13 | End: 2018-12-13

## 2018-12-13 RX ADMIN — ISOSORBIDE MONONITRATE 30 MILLIGRAM(S): 60 TABLET, EXTENDED RELEASE ORAL at 21:05

## 2018-12-13 RX ADMIN — Medication 3 MILLILITER(S): at 08:24

## 2018-12-13 RX ADMIN — Medication 1 DROP(S): at 13:18

## 2018-12-13 RX ADMIN — Medication 3 MILLIGRAM(S): at 22:54

## 2018-12-13 RX ADMIN — MONTELUKAST 10 MILLIGRAM(S): 4 TABLET, CHEWABLE ORAL at 21:06

## 2018-12-13 RX ADMIN — Medication 3 MILLILITER(S): at 13:22

## 2018-12-13 RX ADMIN — Medication 100 MILLIGRAM(S): at 21:05

## 2018-12-13 RX ADMIN — Medication 3 MILLILITER(S): at 05:58

## 2018-12-13 RX ADMIN — Medication 3 MILLILITER(S): at 22:54

## 2018-12-13 RX ADMIN — Medication 0.5 MILLIGRAM(S): at 07:48

## 2018-12-13 RX ADMIN — Medication 0.5 MILLIGRAM(S): at 18:13

## 2018-12-13 RX ADMIN — AMLODIPINE BESYLATE 5 MILLIGRAM(S): 2.5 TABLET ORAL at 05:57

## 2018-12-13 RX ADMIN — SODIUM POLYSTYRENE SULFONATE 15 GRAM(S): 4.1 POWDER, FOR SUSPENSION ORAL at 21:01

## 2018-12-13 RX ADMIN — Medication 2000 UNIT(S): at 21:05

## 2018-12-13 RX ADMIN — Medication 20 MILLIGRAM(S): at 06:03

## 2018-12-13 RX ADMIN — Medication 40 MILLIGRAM(S): at 13:12

## 2018-12-13 RX ADMIN — Medication 40 MILLIGRAM(S): at 21:05

## 2018-12-13 RX ADMIN — Medication 1 TABLET(S): at 21:05

## 2018-12-13 RX ADMIN — ENOXAPARIN SODIUM 40 MILLIGRAM(S): 100 INJECTION SUBCUTANEOUS at 21:33

## 2018-12-13 RX ADMIN — Medication 1 DROP(S): at 05:57

## 2018-12-13 RX ADMIN — Medication 3 MILLILITER(S): at 18:13

## 2018-12-13 RX ADMIN — Medication 81 MILLIGRAM(S): at 21:05

## 2018-12-13 RX ADMIN — ONDANSETRON 4 MILLIGRAM(S): 8 TABLET, FILM COATED ORAL at 07:57

## 2018-12-13 RX ADMIN — INSULIN GLARGINE 16 UNIT(S): 100 INJECTION, SOLUTION SUBCUTANEOUS at 22:54

## 2018-12-13 RX ADMIN — Medication 6 UNIT(S): at 21:32

## 2018-12-13 RX ADMIN — PANTOPRAZOLE SODIUM 40 MILLIGRAM(S): 20 TABLET, DELAYED RELEASE ORAL at 06:46

## 2018-12-13 RX ADMIN — Medication 20 MILLIGRAM(S): at 13:19

## 2018-12-13 RX ADMIN — Medication 20 MILLIGRAM(S): at 00:45

## 2018-12-13 RX ADMIN — SENNA PLUS 2 TABLET(S): 8.6 TABLET ORAL at 21:05

## 2018-12-13 NOTE — CONSULT NOTE ADULT - ASSESSMENT
58 year old F with COPD on home O2 3L, HTN, HLD, CAD s/p x PCI 2016 (mLAD, RCA, Cx), T2DM, PAD, p/w progressive worsening dyspnea x 2 weeks.  Prior TTEs and Cath without evidence of RV dysfunction  LV function normal  Reportedly RV strain in the past    -- no further titration of cardiac meds  -- volume management as per renal  -- further cardiac w/u as per [facility] (currently patient's optimal transplant center)

## 2018-12-13 NOTE — PROGRESS NOTE ADULT - ASSESSMENT
58yoF w/ advanced COPD on home O2 3L (being evaluated at [facility] for lung transplant), HTN, HLD, CAD s/p 6 stents, DMII on metformin, PVD, who p/w progressive worsening dyspnea x 3 weeks c/w COPD exacerbation. Renal now called for persistent hyperkalemia.

## 2018-12-13 NOTE — PROGRESS NOTE ADULT - ASSESSMENT
57 y/o F w/h/o controlled T2DM on Metformin 500mg bid. Also h/o CAD and COPD. Here with COPD exacerbation on Methylprednisolone 20 mg q6h and rebound hyperglycemia. Variable PO intake with BG levels now at goal while on present insulin doses.

## 2018-12-13 NOTE — PROGRESS NOTE ADULT - ASSESSMENT
ASSESSMENT:    ongoing dyspnea with minimal exertion with chronic hypoxic and hypercapnic respiratory failure due to very severe COPD with "exacerbation" - adequate oxygenation on a nasal canula in the setting of profound dyspnea suggests that alterations in the mechanics of breathing due to lung hyperinflation are playing a significant role in shortness of breath - there is evidence of CAD without pulmonary hypertension on the CT scan which is without pulmonary emboli or pneumonia - ECHO has a preserved LVEF, no significant valvular heart disease and no pulmonary hypertension - now with AURORA, hyponatremia, hyperkalemia, worsening respiratory acidosis requiring BIPAP support    HTN/HLD/DM    CAD s/p PCI    PAD    extremity swelling likely related to steroid induced fluid retention and nocturnal hypoxemia - no evidence of DVT on lower extremity Duplex examination    PLAN/RECOMMENDATIONS:    BIPAP 12/5 with 40% FiO2 on and off during the day and during sleep until pH ~ 7.4 then continue just during sleep  oxygen supplementation to keep saturation greater than 92% using a nasal canula when off BIPAP  following ABG  sputum culture - no growth - has completed a course of biaxin  home trilogy vent has been arranged with [facility]   albuterol/atrovent nebs q4h holding 2AM dose  pulmicort 0.5mg nebs q12h  singulair/theophylline - check theophylline level  would continue solumedrol to 20mg IVP q6h - glucose control on high dose steroids - nystatin  cardiac meds: ASA/crestor/imdur/norvasc  cardiology evaluation - ECHO with preserved LVEF, no evidence of valvular heart disease and no evidence of right ventricular dysfunction despite chronic hypoxemia  DVT prophylaxis - SQ lovenox  physical therapy as able  renal and endocrine evaluation noted  outpatient pulmonary rehabilitation  outpatient evaluation for lung transplantation    Will follow with you. Plan of care discussed with the patient at bedside and the medical team. Her prognosis is poor.    David Fair MD, [facility] - [phone]  Pulmonary Medicine ASSESSMENT:    ongoing dyspnea with minimal exertion with chronic hypoxic and hypercapnic respiratory failure due to very severe COPD with "exacerbation" - adequate oxygenation on a nasal canula in the setting of profound dyspnea suggests that alterations in the mechanics of breathing due to lung hyperinflation are playing a significant role in shortness of breath - there is evidence of CAD without pulmonary hypertension on the CT scan which is without pulmonary emboli or pneumonia - ECHO has a preserved LVEF, no significant valvular heart disease and no pulmonary hypertension - now with AURORA, hyponatremia, hyperkalemia and worsening respiratory acidosis requiring BIPAP support - lower extremity weakness and pain 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    HTN/HLD/DM    CAD s/p PCI    PAD    extremity swelling likely related to steroid induced fluid retention and nocturnal hypoxemia - no evidence of DVT on lower extremity Duplex examination    PLAN/RECOMMENDATIONS:    BIPAP 12/5 with 40% FiO2 on and off during the day and during sleep until pH ~ 7.4 then continue just during sleep  oxygen supplementation to keep saturation greater than 92% using a nasal canula when off BIPAP  following ABG  sputum culture - no growth - has completed a course of biaxin  home trilogy vent has been arranged with [facility] surgical supply   albuterol/atrovent nebs q4h holding 2AM dose  pulmicort 0.5mg nebs q12h  singulair/theophylline - check theophylline level  change steroids to prednisone 40mg daily given lack of clinical improvement on high dose steroids and likely steroid related sided effects - glucose control steroids - nystatin  cardiac meds: ASA/imdur/norvasc - discontinue crestor with possible myopathy  cardiology evaluation - ECHO with preserved LVEF, no evidence of valvular heart disease and no evidence of right ventricular dysfunction despite chronic hypoxemia  DVT prophylaxis - SQ lovenox  physical therapy as able  renal and endocrine evaluation noted  outpatient pulmonary rehabilitation  outpatient evaluation for lung transplantation    Will follow with you. Plan of care discussed with the patient and her brothers at bedside and the medical team. I have reached out to her transplant team at [facility] for possible transfer.    David Fair MD, [facility] - [phone]  Pulmonary Medicine

## 2018-12-13 NOTE — PROGRESS NOTE ADULT - PROBLEM SELECTOR PLAN 1
Continue IV Solumedrol 20mg q6h.  CTA showed no PE, no PNA.  Continue Pulmicort, Duoneb ATC (with transition back to spiriva upon discharge), Theophylline, and Singulair.  Completed 7 days of biaxin   Echo report noted, mild diastolic dysfunction, no significant changes from prior study in 2014.  Home Trilogy vent arranged by pulmonary.  Stable with minimal improvement clinically; continue current management.  c/w bipap   d/w pulmonary today

## 2018-12-13 NOTE — CONSULT NOTE ADULT - SUBJECTIVE AND OBJECTIVE BOX
Patient seen and evaluated @ 11:00 AM  Chief Complaint: SOB    HPI:  58 year old F with COPD on home O2 3L, HTN, HLD, CAD s/p x PCI 2016 (mLAD, RCA, Cx), T2DM, PAD, p/w progressive worsening dyspnea x 2 weeks. Pt says she normally uses 3L NC atc at home. Infrequently leaves her house as of late. 2 wks ago, did leave her house a few times, once to go to pulm rehab, and felt much weaker than usual.  Has had increasing dyspnea and fatigue, worse with minimal exertion. Huffing/puffing for breath, sob with talking, showering, etc. +inc sputum volume production, pt says it is white in color, denies purulence. +inc O2 requirement now up to 4LNC at home during last 2 wks. +inc rescue inhaler use upto 7x/daily with minimal improvement.  Denies cough.  No significant inc in wheezing. +some runny nose but denies myalgias, sick contacts, sore throat; +flu shot this year.  Pt denies cp, denies f/c, denies n/v/d, denies orthopnea or significant increase in LE edema. Pt saw her pcp/pulm Dr. Mathew who started her on 10 d course of biaxin and prednisone 60 mg qd, which she has tapered down to 30 mg. Pt has completed a full 7 days of biaxin. Was told to go to ER last week for eval but tried to manage at home, now her sx have progressed.     Of note pt was prev evaluated for lung transplant at [place], but during testing had NSTEMI requiring PCI x 6 in 2016, and was then on Effient for 1 year and recommended repeat testing/rehab prior to subsequent evaluation.     VS: 97.9, 108, 131/69, 22, 96% 3LNC.  Labs: leukocytosis 12.1 with neutrophil predominance, rest of cbc unrevealing, cmp with relatively stable CKD2/3, hyperglycemia, vbg with compensated respiratory acidosis and normal lactate. CXR prelim no acute findings, no infiltrate. In ER pt received duonebs x 3, solumedrol 125 x1 prior to medicine team involvement. Pt endorses improvement in dyspnea since treatment in ER.    Patient initiated on methylpred taper. Vascular consulted, negative BL UE and LE dopplers. BiPAP initiated on  50% FiO2 for worsening CO2 retention.    PMH:   Hyperlipemia  Chronic sinusitis  Raynaud phenomenon  HTN (hypertension)  DM (diabetes mellitus)  Claustrophobia  COPD (chronic obstructive pulmonary disease)    PSH:   S/P tonsillectomy    Medications:   ALBUTerol/ipratropium for Nebulization 3 milliLiter(s) Nebulizer <User Schedule>  amLODIPine   Tablet 5 milliGRAM(s) Oral daily  artificial tears (preservative free) Ophthalmic Solution 1 Drop(s) Both EYES three times a day  aspirin enteric coated 81 milliGRAM(s) Oral daily  Biotene Dry Mouth Oral Rinse 5 milliLiter(s) Swish and Spit two times a day  bisacodyl Suppository 10 milliGRAM(s) Rectal daily PRN  buDESOnide   0.5 milliGRAM(s) Respule 0.5 milliGRAM(s) Inhalation every 12 hours  cholecalciferol 2000 Unit(s) Oral daily  dextrose 40% Gel 15 Gram(s) Oral once PRN  dextrose 5%. 1000 milliLiter(s) IV Continuous <Continuous>  dextrose 50% Injectable 12.5 Gram(s) IV Push once  dextrose 50% Injectable 25 Gram(s) IV Push once  dextrose 50% Injectable 25 Gram(s) IV Push once  docusate sodium 100 milliGRAM(s) Oral daily  enoxaparin Injectable 40 milliGRAM(s) SubCutaneous every 24 hours  glucagon  Injectable 1 milliGRAM(s) IntraMuscular once PRN  insulin glargine Injectable (LANTUS) 20 Unit(s) SubCutaneous at bedtime  insulin lispro (HumaLOG) corrective regimen sliding scale   SubCutaneous three times a day before meals  insulin lispro (HumaLOG) corrective regimen sliding scale   SubCutaneous at bedtime  insulin lispro Injectable (HumaLOG) 10 Unit(s) SubCutaneous before lunch  insulin lispro Injectable (HumaLOG) 10 Unit(s) SubCutaneous before breakfast  insulin lispro Injectable (HumaLOG) 12 Unit(s) SubCutaneous with dinner  isosorbide   mononitrate ER Tablet (IMDUR) 30 milliGRAM(s) Oral daily  melatonin 3 milliGRAM(s) Oral at bedtime  montelukast 10 milliGRAM(s) Oral daily  multivitamin 1 Tablet(s) Oral daily  nystatin    Suspension 709557 Unit(s) Oral four times a day  ondansetron Injectable 4 milliGRAM(s) IV Push every 8 hours PRN  pantoprazole    Tablet 40 milliGRAM(s) Oral before breakfast  polyethylene glycol 3350 17 Gram(s) Oral daily  predniSONE   Tablet 40 milliGRAM(s) Oral daily  senna 2 Tablet(s) Oral at bedtime  sodium chloride 0.65% Nasal 1 Spray(s) Both Nostrils two times a day PRN  sodium polystyrene sulfonate Suspension 15 Gram(s) Oral once  theophylline ER Capsule 400 milliGRAM(s) Oral daily    Allergies:  No Known Allergies    FAMILY HISTORY:  FH: MI (myocardial infarction) (Father)  FH: CABG (coronary artery bypass surgery) (Father)    Social History:  Smoking:  Alcohol:  Drugs:    Review of Systems:  Constitutional: [ ] Fever [ ] Chills [ ] Fatigue [ ] Weight change   HEENT: [ ] Blurred vision [ ] Eye Pain [ ] Headache [ ] Runny nose [ ] Sore Throat   Respiratory: [ ] Cough [ ] Wheezing [ ] Shortness of breath  Cardiovascular: [ ] Chest Pain [ ] Palpitations [ ] NIELSON [ ] PND [ ] Orthopnea  Gastrointestinal: [ ] Abdominal Pain [ ] Diarrhea [ ] Constipation [ ] Hemorrhoids [ ] Nausea [ ] Vomiting  Genitourinary: [ ] Nocturia [ ] Dysuria [ ] Incontinence  Extremities: [ ] Swelling [ ] Joint Pain  Neurologic: [ ] Focal deficit [ ] Paresthesias [ ] Syncope  Lymphatic: [ ] Swelling [ ] Lymphadenopathy   Skin: [ ] Rash [ ] Ecchymoses [ ] Wounds [ ] Lesions  Psychiatry: [ ] Depression [ ] Suicidal/Homicidal Ideation [ ] Anxiety [ ] Sleep Disturbances  [ ] 10 point review of systems is otherwise negative except as mentioned above            [ ]Unable to obtain    Physical Exam:  T(C): 36.5 (18 @ 16:35), Max: 36.5 (18 @ 08:03)  HR: 105 (18 @ 16:35) (82 - 105)  BP: 115/70 (18 @ 16:35) (115/70 - 124/75)  RR: 18 (18 @ 16:35) (15 - 18)  SpO2: 95% (18 @ 16:35) (95% - 100%)  Wt(kg): --     @ 07:01  -   @ 07:00  --------------------------------------------------------  IN: 240 mL / OUT: 0 mL / NET: 240 mL     @ 07:01  -   @ 18:41  --------------------------------------------------------  IN: 300 mL / OUT: 0 mL / NET: 300 mL      Daily     Daily     Appearance: NAD  Eyes: PERRL, EOMI  HENT: Normal oral muscosa, plethoric  Cardiovascular: normal S1 and S2, RRR, no m/r/g, no edema, normal JVP  Respiratory: diminished breath sounds  Gastrointestinal: Soft, non-tender, non-distended, BS+  Musculoskeletal: No clubbing, no joint deformity   Neurologic: Non-focal  Lymphatic: No lymphadenopathy  Psychiatry: AAOx3, mood & affect appropriate  Skin: No rashes, no ecchymoses, no cyanosis    Cardiovascular Diagnostic Testing:  ECG: sinus, RBBB    Echo:  TTE 18  Conclusions:  1. Normal mitral valve.  2. Normal trileaflet aortic valve. Peak transaortic valve  gradient equals 6 mm Hg, mean transaortic valve gradient  equals 3 mm Hg, aortic valve velocity time integral equals  22 cm. Trace aortic regurgitation.  3. Aortic Root: 2.9 cm.  4. Normal left atrium.  LA volume index = 18 cc/m2.  5. Normal left ventricular internal dimensions and wall  thicknesses.  6. Normal left ventricular systolic function. No segmental  wall motion abnormalities.  7. Mild diastolic dysfunction (Stage I).  8. Normal right ventricular size and function.  9. Inadequate tricuspid regurgitation Doppler envelope  precludes estimation of RVSP.  10. Normal tricuspid valve. Minimal tricuspid  regurgitation.    Stress Testing:  none    Cath:  Cath 2017  VENTRICLES: Global left ventricular function was normal. EF estimated was  65 %.  CORONARY VESSELS: The coronary circulation is right dominant.  LM:   --  Mid left main: There was a 30 % stenosis.  LAD:   --  Ostial LAD: There was a 40 % stenosis.  CX:   --  Circumflex: Normal.  RCA:   --  RCA: Normal.  COMPLICATIONS: There were no complications.  DIAGNOSTIC RECOMMENDATIONS: The patient should continue with the present  medications.  Prepared and signed by  Ronny Echeverria M.D.  Signed 2017 11:17:39  HEMODYNAMIC TABLES  Pressures:  Baseline/ Room Air  Pressures:  - HR: 95  Pressures:  - Rhythm:  Pressures:  -- Aortic Pressure (S/D/M): 134/84/106  Pressures:  -- Left Ventricle (s/edp): 132/22/--  Pressures:  -- Pulmonary Artery (S/D/M): 38/21/29  Pressures:  -- Pulmonary Capillary Wedge: /  Pressures:  -- Right Atrium (a/v/M): /  Pressures:  -- Right Ventricle (s/edp): 36/18/--  O2 Sats:  Baseline/ Room Air  O2 Sats:  - HR: 95  O2 Sats:  - Rhythm:  O2 Sats:  -- AO: 10.5/97.8/13.97  O2 Sats:  -- PA: 10.2/62.6/8.68  Outputs:  Baseline/ Room Air  Outputs:  -- CALCULATIONS[age]  Outputs:  -- CALCULATIONS: Body Surface Area: 2.00  Outputs:  -- CALCULATIONS: Height in cm: 163.00  Outputs:  -- CALCULATIONS: Sex: Female  Outputs:  -- CALCULATIONS: Weight in k.30  Outputs:  -- OUTPUTS: CO by Real: 5.07  Outputs:  -- OUTPUTS: Real cardiac index: 2.54  Outputs:  -- OUTPUTS: O2 consumption: 268.00  Outputs:  -- OUTPUTS: Vo2 Indexed: 133.91  Outputs:  -- RESISTANCES: Left ventricular stroke work: 65.83  Outputs:  -- RESISTANCES: Left Ventricular Stroke Work index: 32.89  Outputs:  -- RESISTANCES: Pulmonary vascular index (dsc): 189.28  Outputs:  -- RESISTANCES: Pulmonary vascular index (Wood Units): 2.37  Outputs:  -- RESISTANCES: Pulmonary vascular resistance (dsc): 94.58  Outputs:  -- RESISTANCES: Pulmonary vascular resistance (Wood Units): 1.18  Outputs:  -- RESISTANCES: PVR_SVR Ratio: 0.07  Outputs:  -- RESISTANCES: Right ventricular stroke work: 8.72  Outputs:  -- RESISTANCES: Right ventricular stroke work index: 4.36  Outputs:  -- RESISTANCES: Systemic vascular index (dsc): 2776.14  Outputs:  -- RESISTANCES: Systemic vascular index (Wood Units): 34.71  Outputs:  -- RESISTANCES: Systemic vascular resistance (dsc): 1387.16  Outputs:  -- RESISTANCES: Systemic vascular resistance (Wood Units): 17.34  Outputs:  -- RESISTANCES: Total pulmonary index (dsc): 914.87  Outputs:  -- RESISTANCES: Total pulmonary index (Wood Units): 11.44  Outputs:  -- RESISTANCES: Total pulmonary resistance (dsc): 457.13  Outputs:  -- RESISTANCES: Total pulmonary resistance (Wood Units): 5.72  Outputs:  -- RESISTANCES: Total vascular index (Wood Units): 41.81  Outputs:  -- RESISTANCES: Total vascular resistance (dsc): 1670.89  Outputs:  -- RESISTANCES: Total vascular resistance (Wood Units): 20.89  Outputs:  -- RESISTANCES: Total vascular resistance index (dsc): 3343.99  Outputs:  -- RESISTANCES: TPR_TVR Ratio: 0.27  Outputs:  -- SHUNTS: Pulmonary flow: 5.07  Outputs:  -- SHUNTS: Qp Indexed: 2.54  Outputs:  -- SHUNTS: Qs Indexed: 2.54  Outputs:  -- SHUNTS: Systemic flow: 5.07    Interpretation of Telemetry: none    Imaging:  CXR  IMPRESSION:   Clear lungs.     CT PE  IMPRESSION:   1.  No pulmonary embolism.  2. Emphysematous changes of the lung.    Labs:                        14.2   16.5  )-----------( 183      ( 13 Dec 2018 14:00 )             42.3     12-13    130<L>  |  89<L>  |  60<H>  ----------------------------<  80  6.1<H>   |  30  |  1.56<H>    Ca    9.7      13 Dec 2018 14:00  Phos  4.1     12-13  Mg     2.3     12-13            Serum Pro-Brain Natriuretic Peptide: 254 pg/mL ( @ 14:00)

## 2018-12-13 NOTE — PROGRESS NOTE ADULT - PROBLEM SELECTOR PLAN 1
in setting of ARB use and hyperglycemia, most likely due to low elida/renin state; now in setting of AURORA and acidosis. Was taken off Benicar, and switched to Norvasc and K improved. Has been on steroids- less likely any adrenal insufficiency. Pt may be retaining in setting of morphine use.   - F/U bladder sonogram to r/o retention.   - Bipap to help improving CO2 retention resulting in respiratory acidosis.  - Would diurese with IV lasix- can give 40mg IV daily.  - Continue to control blood glucose.   - Low K diet.  - Monitor K daily.  - Avoid ACEi/ARB therapy.

## 2018-12-13 NOTE — PROGRESS NOTE ADULT - SUBJECTIVE AND OBJECTIVE BOX
[facility] DIVISION OF KIDNEY DISEASES AND HYPERTENSION -- FOLLOW UP NOTE  --------------------------------------------------------------------------------  Chief Complaint:  hyperkalemia, AURORA    24 hour events/subjective:  Pt fatigues, on BiPAP.      PAST HISTORY  --------------------------------------------------------------------------------  No significant changes to PMH, PSH, FHx, SHx, unless otherwise noted    ALLERGIES & MEDICATIONS  --------------------------------------------------------------------------------  Allergies    No Known Allergies    Intolerances      Standing Inpatient Medications  ALBUTerol/ipratropium for Nebulization 3 milliLiter(s) Nebulizer <User Schedule>  amLODIPine   Tablet 5 milliGRAM(s) Oral daily  artificial tears (preservative free) Ophthalmic Solution 1 Drop(s) Both EYES three times a day  aspirin enteric coated 81 milliGRAM(s) Oral daily  Biotene Dry Mouth Oral Rinse 5 milliLiter(s) Swish and Spit two times a day  buDESOnide   0.5 milliGRAM(s) Respule 0.5 milliGRAM(s) Inhalation every 12 hours  cholecalciferol 2000 Unit(s) Oral daily  dextrose 5%. 1000 milliLiter(s) IV Continuous <Continuous>  dextrose 50% Injectable 12.5 Gram(s) IV Push once  dextrose 50% Injectable 25 Gram(s) IV Push once  dextrose 50% Injectable 25 Gram(s) IV Push once  docusate sodium 100 milliGRAM(s) Oral daily  enoxaparin Injectable 40 milliGRAM(s) SubCutaneous every 24 hours  insulin glargine Injectable (LANTUS) 20 Unit(s) SubCutaneous at bedtime  insulin lispro (HumaLOG) corrective regimen sliding scale   SubCutaneous three times a day before meals  insulin lispro (HumaLOG) corrective regimen sliding scale   SubCutaneous at bedtime  insulin lispro Injectable (HumaLOG) 10 Unit(s) SubCutaneous before lunch  insulin lispro Injectable (HumaLOG) 10 Unit(s) SubCutaneous before breakfast  insulin lispro Injectable (HumaLOG) 12 Unit(s) SubCutaneous with dinner  isosorbide   mononitrate ER Tablet (IMDUR) 30 milliGRAM(s) Oral daily  melatonin 3 milliGRAM(s) Oral at bedtime  montelukast 10 milliGRAM(s) Oral daily  multivitamin 1 Tablet(s) Oral daily  nystatin    Suspension 535218 Unit(s) Oral four times a day  pantoprazole    Tablet 40 milliGRAM(s) Oral before breakfast  polyethylene glycol 3350 17 Gram(s) Oral daily  predniSONE   Tablet 40 milliGRAM(s) Oral daily  senna 2 Tablet(s) Oral at bedtime  sodium polystyrene sulfonate Suspension 15 Gram(s) Oral once  theophylline ER Capsule 400 milliGRAM(s) Oral daily    PRN Inpatient Medications  bisacodyl Suppository 10 milliGRAM(s) Rectal daily PRN  dextrose 40% Gel 15 Gram(s) Oral once PRN  glucagon  Injectable 1 milliGRAM(s) IntraMuscular once PRN  ondansetron Injectable 4 milliGRAM(s) IV Push every 8 hours PRN  sodium chloride 0.65% Nasal 1 Spray(s) Both Nostrils two times a day PRN      REVIEW OF SYSTEMS  --------------------------------------------------------------------------------  Gen: + fatigue  Respiratory: + dyspnea  CV: No chest pain  GI: + constipation  MSK: + edema    VITALS/PHYSICAL EXAM  --------------------------------------------------------------------------------  T(C): 36.5 (12-13-18 @ 16:35), Max: 36.5 (12-13-18 @ 08:03)  HR: 102 (12-13-18 @ 16:37) (82 - 105)  BP: 115/70 (12-13-18 @ 16:35) (115/70 - 124/75)  RR: 18 (12-13-18 @ 16:35) (15 - 18)  SpO2: 98% (12-13-18 @ 16:37) (95% - 100%)  Wt(kg): --        12-12-18 @ 07:01  -  12-13-18 @ 07:00  --------------------------------------------------------  IN: 240 mL / OUT: 0 mL / NET: 240 mL    12-13-18 @ 07:01  -  12-13-18 @ 20:32  --------------------------------------------------------  IN: 300 mL / OUT: 1500 mL / NET: -1200 mL      Physical Exam:  	Gen: fatigued appearing, on BiPAP  	Pulm: decreased air entry  	CV: RRR, S1S2; no rub  	Abd: +BS, soft  	LE: Warm, b/l LE edema    LABS/STUDIES  --------------------------------------------------------------------------------              14.2   16.5  >-----------<  183      [12-13-18 @ 14:00]              42.3     130  |  89  |  60  ----------------------------<  80      [12-13-18 @ 14:00]  6.1   |  30  |  1.56        Ca     9.7     [12-13-18 @ 14:00]      Mg     2.3     [12-13-18 @ 14:00]      Phos  4.1     [12-13-18 @ 14:00]            Creatinine Trend:  SCr 1.56 [12-13 @ 14:00]  SCr 1.82 [12-13 @ 01:53]  SCr 1.43 [12-12 @ 07:02]  SCr 1.22 [12-11 @ 09:46]  SCr 1.17 [12-10 @ 06:21]    Urinalysis - [12-13-18 @ 16:09]      Color Yellow / Appearance Clear / SG 1.022 / pH 5.5      Gluc Negative / Ketone Negative  / Bili Negative / Urobili Negative       Blood Negative / Protein Negative / Leuk Est Negative / Nitrite Negative      RBC  / WBC  / Hyaline  / Gran  / Sq Epi  / Non Sq Epi  / Bacteria     Urine Creatinine 76      [12-13-18 @ 14:06]  Urine Sodium 20      [12-13-18 @ 14:06]  Urine Potassium 50      [12-13-18 @ 14:06]    HbA1c 7.2      [11-30-18 @ 07:58]

## 2018-12-13 NOTE — PROGRESS NOTE ADULT - PROBLEM SELECTOR PLAN 2
possibly from retention. Baseline sCr 1-1.3. SCr to 1.8 today.  - Bladder sono to r/o retention.  - Obtain renal US.  - Check UA  - Check urine Na, Cl, K, Cr, Urea, Osm  - Monitor BMP  - Dose meds renally

## 2018-12-13 NOTE — PROGRESS NOTE ADULT - SUBJECTIVE AND OBJECTIVE BOX
Patient is a 58y old  Female who presents with a chief complaint of dyspnea (12 Dec 2018 16:03)      SUBJECTIVE / OVERNIGHT EVENTS: still with difficulty breathing, reports worsening swelling in arms, no cp, had bm,     MEDICATIONS  (STANDING):  ALBUTerol/ipratropium for Nebulization 3 milliLiter(s) Nebulizer <User Schedule>  amLODIPine   Tablet 5 milliGRAM(s) Oral daily  artificial tears (preservative free) Ophthalmic Solution 1 Drop(s) Both EYES three times a day  aspirin enteric coated 81 milliGRAM(s) Oral daily  Biotene Dry Mouth Oral Rinse 5 milliLiter(s) Swish and Spit two times a day  buDESOnide   0.5 milliGRAM(s) Respule 0.5 milliGRAM(s) Inhalation every 12 hours  cholecalciferol 2000 Unit(s) Oral daily  dextrose 5%. 1000 milliLiter(s) (50 mL/Hr) IV Continuous <Continuous>  dextrose 50% Injectable 12.5 Gram(s) IV Push once  dextrose 50% Injectable 25 Gram(s) IV Push once  dextrose 50% Injectable 25 Gram(s) IV Push once  docusate sodium 100 milliGRAM(s) Oral daily  enoxaparin Injectable 40 milliGRAM(s) SubCutaneous every 24 hours  furosemide   Injectable 40 milliGRAM(s) IV Push once  insulin glargine Injectable (LANTUS) 22 Unit(s) SubCutaneous at bedtime  insulin lispro (HumaLOG) corrective regimen sliding scale   SubCutaneous three times a day before meals  insulin lispro (HumaLOG) corrective regimen sliding scale   SubCutaneous at bedtime  insulin lispro Injectable (HumaLOG) 14 Unit(s) SubCutaneous before dinner  insulin lispro Injectable (HumaLOG) 12 Unit(s) SubCutaneous before breakfast  insulin lispro Injectable (HumaLOG) 12 Unit(s) SubCutaneous before lunch  isosorbide   mononitrate ER Tablet (IMDUR) 30 milliGRAM(s) Oral daily  melatonin 3 milliGRAM(s) Oral at bedtime  methylPREDNISolone sodium succinate Injectable 20 milliGRAM(s) IV Push every 6 hours  montelukast 10 milliGRAM(s) Oral daily  multivitamin 1 Tablet(s) Oral daily  nystatin    Suspension [id number] Unit(s) Oral four times a day  pantoprazole    Tablet 40 milliGRAM(s) Oral before breakfast  polyethylene glycol 3350 17 Gram(s) Oral daily  rosuvastatin 10 milliGRAM(s) Oral at bedtime  senna 2 Tablet(s) Oral at bedtime  theophylline ER Capsule 400 milliGRAM(s) Oral daily    MEDICATIONS  (PRN):  bisacodyl Suppository 10 milliGRAM(s) Rectal daily PRN Constipation  dextrose 40% Gel 15 Gram(s) Oral once PRN Blood Glucose LESS THAN 70 milliGRAM(s)/deciliter  glucagon  Injectable 1 milliGRAM(s) IntraMuscular once PRN Glucose LESS THAN 70 milligrams/deciliter  ondansetron Injectable 4 milliGRAM(s) IV Push every 8 hours PRN Nausea and/or Vomiting  sodium chloride 0.65% Nasal 1 Spray(s) Both Nostrils two times a day PRN Nasal Congestion        CAPILLARY BLOOD GLUCOSE      POCT Blood Glucose.: 110 mg/dL (13 Dec 2018 08:13)  POCT Blood Glucose.: 109 mg/dL (12 Dec 2018 22:53)  POCT Blood Glucose.: 228 mg/dL (12 Dec 2018 18:17)    I&O's Summary    12 Dec 2018 07:01  -  13 Dec 2018 07:00  --------------------------------------------------------  IN: 240 mL / OUT: 0 mL / NET: 240 mL        PHYSICAL EXAM:  GENERAL: NAD, well-developed, obese   HEAD:  Atraumatic, Normocephalic  EYES: conjunctiva and sclera clear  NECK:  No JVD  CHEST/LUNG: decreased breath sounds b/l, rhonchi   HEART: Regular rate and rhythm; S1S2  ABDOMEN: Soft, Nontender, Nondistended; Bowel sounds present  EXTREMITIES:  +1-2 B/l UE edema, compression stocking le, edema improved   PSYCH: AAOx3  NEUROLOGY: non-focal  SKIN: No rashes or lesions    LABS:                        13.8   18.59 )-----------( 190      ( 12 Dec 2018 07:49 )             40.4     12-13    128<L>  |  86<L>  |  66<H>  ----------------------------<  108<H>  5.8<H>   |  28  |  1.82<H>    Ca    8.9      13 Dec 2018 01:53                RADIOLOGY & ADDITIONAL TESTS:    Imaging Personally Reviewed:    Consultant(s) Notes Reviewed:      Care Discussed with Consultants/Other Providers: pulm, renal

## 2018-12-13 NOTE — PROGRESS NOTE ADULT - SUBJECTIVE AND OBJECTIVE BOX
[facility] - [facility]     PROGRESS NOTE    CHIEF COMPLAINT:    INTERVAL HISTORY: hyponatremia; hyperkalemia; worsening renal function; on BIPAP due to worsening respiratory acidosis; off MS contin due to sedation; very frustrated about prolonged hospitalization and lack of clinical improvement; no shortness of breath at rest but severely dyspneic with minimal exertion; no cough, sputum production, chest congestion or wheeze; no fevers, chills or sweats; no chest pain/pressure or palpitations; improved glucose control; persistent extremity swelling upper > lower; sputum cultures without growth x 2;     REVIEW OF SYSTEMS:  Constitutional: As per interval history  HEENT: Within normal limits  CV: As per interval history  Resp: As per interval history  GI: Within normal limits   : Within normal limits  Musculoskeletal: Within normal limits  Skin: Within normal limits  Neurological: Within normal limits  Psychiatric: Within normal limits  Endocrine: Within normal limits  Hematologic/Lymphatic: Within normal limits  Allergic/Immunologic: Within normal limits    MEDICATIONS:     Pulmonary "  ALBUTerol/ipratropium for Nebulization 3 milliLiter(s) Nebulizer <User Schedule>  buDESOnide   0.5 milliGRAM(s) Respule 0.5 milliGRAM(s) Inhalation every 12 hours  montelukast 10 milliGRAM(s) Oral daily  theophylline ER Capsule 400 milliGRAM(s) Oral daily      Anti-microbials:  nystatin    Suspension 375649 Unit(s) Oral four times a day      Cardiovascular:  amLODIPine   Tablet 5 milliGRAM(s) Oral daily  isosorbide   mononitrate ER Tablet (IMDUR) 30 milliGRAM(s) Oral daily      Other:  artificial tears (preservative free) Ophthalmic Solution 1 Drop(s) Both EYES three times a day  aspirin enteric coated 81 milliGRAM(s) Oral daily  Biotene Dry Mouth Oral Rinse 5 milliLiter(s) Swish and Spit two times a day  bisacodyl Suppository 10 milliGRAM(s) Rectal daily PRN  cholecalciferol 2000 Unit(s) Oral daily  dextrose 40% Gel 15 Gram(s) Oral once PRN  dextrose 5%. 1000 milliLiter(s) IV Continuous <Continuous>  dextrose 50% Injectable 12.5 Gram(s) IV Push once  dextrose 50% Injectable 25 Gram(s) IV Push once  dextrose 50% Injectable 25 Gram(s) IV Push once  docusate sodium 100 milliGRAM(s) Oral daily  enoxaparin Injectable 40 milliGRAM(s) SubCutaneous every 24 hours  glucagon  Injectable 1 milliGRAM(s) IntraMuscular once PRN  insulin glargine Injectable (LANTUS) 22 Unit(s) SubCutaneous at bedtime  insulin lispro (HumaLOG) corrective regimen sliding scale   SubCutaneous three times a day before meals  insulin lispro (HumaLOG) corrective regimen sliding scale   SubCutaneous at bedtime  insulin lispro Injectable (HumaLOG) 14 Unit(s) SubCutaneous before dinner  insulin lispro Injectable (HumaLOG) 12 Unit(s) SubCutaneous before breakfast  insulin lispro Injectable (HumaLOG) 12 Unit(s) SubCutaneous before lunch  methylPREDNISolone sodium succinate Injectable 20 milliGRAM(s) IV Push every 6 hours  multivitamin 1 Tablet(s) Oral daily  ondansetron Injectable 4 milliGRAM(s) IV Push every 8 hours PRN  pantoprazole    Tablet 40 milliGRAM(s) Oral before breakfast  polyethylene glycol 3350 17 Gram(s) Oral daily  rosuvastatin 10 milliGRAM(s) Oral at bedtime  senna 2 Tablet(s) Oral at bedtime  sodium chloride 0.65% Nasal 1 Spray(s) Both Nostrils two times a day PRN  sodium chloride 0.9%. 1000 milliLiter(s) IV Continuous <Continuous>        OBJECTIVE:    I&O's Detail    12 Dec 2018 07:01  -  13 Dec 2018 07:00  --------------------------------------------------------  IN:    Oral Fluid: 240 mL  Total IN: 240 mL    OUT:  Total OUT: 0 mL    Total NET: 240 mL    POCT Blood Glucose.: 110 mg/dL (13 Dec 2018 08:13)  POCT Blood Glucose.: 109 mg/dL (12 Dec 2018 22:53)  POCT Blood Glucose.: 228 mg/dL (12 Dec 2018 18:17)  POCT Blood Glucose.: 124 mg/dL (12 Dec 2018 11:35)      PHYSICAL EXAM:       ICU Vital Signs Last 24 Hrs  T(C): 36.5 (13 Dec 2018 08:03), Max: 36.5 (13 Dec 2018 08:03)  T(F): 97.7 (13 Dec 2018 08:03), Max: 97.7 (13 Dec 2018 08:03)  HR: 100 (13 Dec 2018 09:30) (82 - 102)  BP: 124/75 (13 Dec 2018 08:03) (123/60 - 134/74)  BP(mean): --  ABP: --  ABP(mean): --  RR: 15 (13 Dec 2018 08:03) (15 - 18)  SpO2: 97% (13 Dec 2018 09:30) (94% - 100%) on 40% FiO2 on BIPAP     General: Awake. Alert. Cooperative. No distress. Appears stated age. Obese. On BIPAP.	  HEENT:  Atraumatic. Normocephalic. Anicteric. Normal oral mucosa. PERRL. EOMI. BIPAP mask  Neck: Supple. Trachea midline. Thyroid without enlargement/tenderness/nodules. No carotid bruit. No JVD.	  Cardiovascular: Regular rate and rhythm. Distant S1 S2. No murmurs, rubs or gallops.  Respiratory: Respirations unlabored. Markedly decreased breath sounds throughout. Prolonged expiratory phase of respiration. No wheeze. No curvature.  Abdomen: Soft. Non-tender. Non-distended. No organomegaly. No masses. Normal bowel sounds. Obese.  Extremities: Warm to touch. No clubbing or cyanosis. Mild bilateral lower extremity edema up to the thigh. Moderate bilateral upper extremity swelling right > left  Pulses: Decreased lower extremity peripheral pulses.  Skin: No rashes or lesions. No ecchymoses. No cyanosis. Warm to touch.  Lymph Nodes: Cervical, supraclavicular and axillary nodes normal  Neurological: Motor and sensory examination equal and normal. A and O x 3  Psychiatry: Appropriate mood and affect.     LABS:                        13.8   18.59 )-----------( 190      ( 12 Dec 2018 07:49 )             40.4     12-13    128<L>  |  86<L>  |  66<H>  ----------------------------<  108<H>  5.8<H>   |  28  |  1.82<H>    12-12    127<L>  |  89<L>  |  57<H>  ----------------------------<  104<H>  6.0<H>   |  22  |  1.43<H>    Ca      8.9      12-13    Ca      8.8      12-12    Phos    3.8     12-11    Phos    3.0     12-10      Mg       2.4     12-11    Mg       2.1     12-10    ABG - ( 13 Dec 2018 06:29 )  pH: 7.30  /  pCO2: 71    /  pO2: 182   / HCO3: 34    / Base Excess: 5.8   /  SaO2: 99        ABG - ( 13 Dec 2018 00:59 )  pH: 7.32  /  pCO2: 71    /  pO2: 75    / HCO3: 35    / Base Excess: 7.1   /  SaO2: 95       ABG - ( 11 Dec 2018 11:45 )  pH: 7.39  /  pCO2: 54    /  pO2: 98    / HCO3: 32    / Base Excess: 6.2   /  SaO2: 98        ABG - ( 09 Dec 2018 11:26 )  pH: 7.35  /  pCO2: 63    /  pO2: 145   / HCO3: 34    / Base Excess: 6.6   /  SaO2: 99      ABG - ( 09 Dec 2018 00:28 )  pH: 7.32  /  pCO2: 71    /  pO2: 124   / HCO3: 36    / Base Excess: 7.6   /  SaO2: 99        ABG - ( 08 Dec 2018 20:50 )  pH: 7.32  /  pCO2: 72    /  pO2: 80    / HCO3: 36    / Base Excess: 7.7   /  SaO2: 95        < from: Transthoracic Echocardiogram (12.07.18 @ 08:59) >    Patient name: GUY HARTMAN  YOB: 1960   Age: 58 (F)   MR#: [id number]  Study Date: 12/7/2018  Location: [address] [place], [place] [id number]: Laquita Armando [facility]  Study quality: Technically good  Referring Physician: Allen ingram MD  BloodPressure: 120/80 mmHg  Height: 163 cm  Weight: 88 kg  BSA: 1.9 m2  ------------------------------------------------------------------------  PROCEDURE: Transthoracic echocardiogram with 2-D, M-Mode  and complete spectral and color flow Doppler.  INDICATION: Other forms of dyspnea (R06.09)  ------------------------------------------------------------------------  Dimensions:    Normal Values:  LA:     3.6    2.0 - 4.0 cm  Ao:     2.9    2.0 - 3.8 cm  SEPTUM: 0.9    0.6 - 1.2 cm  PWT:    0.8    0.6- 1.1 cm  LVIDd:  4.9    3.0 - 5.6 cm  LVIDs:  2.9    1.8 - 4.0 cm  Derived variables:  LVMI: 73 g/m2  RWT: 0.32  Fractional short: 40 %  EF (Teicholtz): 71 %  Doppler Peak Velocity (m/sec): AoV=1.2  ------------------------------------------------------------------------  Observations:  Mitral Valve: Normal mitral valve.  Aortic Valve/Aorta: Normal trileaflet aortic valve. Peak  transaortic valve gradient equals 6 mm Hg, mean transaortic  valve gradient equals 3 mm Hg, aortic valve velocity time  integral equals 22 cm. Trace aortic regurgitation.  Aortic Root: 2.9 cm.  Left Atrium: Normal left atrium.  LA volume index = 18  cc/m2.  Left Ventricle: Normal left ventricular systolic function.  No segmental wall motion abnormalities. Normal left  ventricular internal dimensions and wall thicknesses. Mild  diastolic dysfunction (Stage I).  Right Heart: Normal right atrium. Normal right ventricular  size and function. Normal tricuspid valve. Minimal  tricuspid regurgitation. Normal pulmonic valve.  Pericardium/Pleura: Normal pericardium with no pericardial  effusion.  Hemodynamic: Estimated right atrial pressure is 8 mm Hg.  Inadequate tricuspid regurgitation Doppler envelope  precludes estimation of RVSP.  ------------------------------------------------------------------------  Conclusions:  1. Normal mitral valve.  2. Normal trileaflet aortic valve. Peak transaortic valve  gradient equals 6 mm Hg, mean transaortic valve gradient  equals 3 mm Hg, aortic valve velocity time integral equals  22 cm. Trace aortic regurgitation.  3. Aortic Root: 2.9 cm.  4. Normal left atrium.  LA volume index = 18 cc/m2.  5. Normal left ventricular internal dimensions and wall  thicknesses.  6. Normal left ventricular systolic function. No segmental  wall motion abnormalities.  7. Mild diastolic dysfunction (Stage I).  8. Normal right ventricular size and function.  9. Inadequate tricuspid regurgitation Doppler envelope  precludes estimation of RVSP.  10. Normal tricuspid valve. Minimal tricuspid  regurgitation.  *** Compared with echocardiogram report of 2/18/2014, no  significant changes noted.  ------------------------------------------------------------------------  Confirmed on  12/7/2018 - 13:49:43 by Shefali Gómez M.D.  ------------------------------------------------------------------------    < end of copied text >  ---------------------------------------------------------------------------------------------------------------    MICROBIOLOGY:     Culture - Sputum . (12.07.18 @ 08:34)    Gram Stain:   Rare polymorphonuclear leukocytes per low power field  Rare Squamous epithelial cells per low power field  Numerous Gram Positive Cocci in Pairs and Chains per oil power field    Specimen Source: .Sputum Sputum    Culture Results:   Normal Respiratory Samaria present    Culture - Sputum . (12.05.18 @ 22:50)    Gram Stain:   Moderate polymorphonuclear leukocytes per low power field  Few Squamous epithelial cells per low power field  Moderate Gram Positive Cocci in Pairs and Chains per oil power field    Specimen Source: .Sputum Sputum    Culture Results:   Normal Respiratory Samaria present    Rapid Respiratory Viral Panel (11.30.18 @ 01:30)    Rapid RVP Result: NotDetec: The FilmArray RVP Rapid uses polymerase chain reaction (PCR) and melt  curve analysis to screen for adenovirus; coronavirus HKU1, NL63, 229E,  OC43; human metapneumovirus (hMPV); human enterovirus/rhinovirus  (Entero/RV); influenza A; influenza A/H1;influenza A/H3; influenza  A/H1-2009; influenza B; parainfluenza viruses 1, 2, 3, 4; respiratory  syncytial virus; Bordetella pertussis; Mycoplasma pneumoniae; and  Chlamydophila pneumoniae.    RADIOLOGY:  [x] Chest radiographs reviewed and interpreted by me    < from: CT Angio Chest w/ IV Cont (12.04.18 @ 16:30) >    EXAM:  CT ANGIO CHEST (W)AW IC                          PROCEDURE DATE:  12/04/2018      INTERPRETATION:  CT CHEST WITH CONTRAST    INDICATION: Known COPD not responding to steroids and bronchodilators.   Progressively worsening dyspnea. Evaluate for pulmonary embolism.    TECHNIQUE: Enhanced helical images were obtained of the chest. Coronal   and sagittal images were reconstructed. Maximum intensity projection   images were generated. Images were obtained after the uneventful   administration of 60 cc nonionic intravenous Omnipaque 350.  40 cc of   Omnipaque 350 was discarded.    COMPARISON: CT chest 2/18/2014.    FINDINGS:     Lungs And Airways: Emphysematous changes of the lung.      The airways are unremarkable.      Pleura: No pneumothorax. No pleural effusion.    Mediastinum: There are no enlarged chest lymph nodes. The visualized   portion of the thyroid gland is unremarkable.       Heart and Vasculature: No pulmonary embolism.    The main pulmonary artery is normal in caliber. No thoracic aortic   aneurysm or dissection. Atheromatous disease of the aorta.    The heart is normal in size.  There is no pericardial effusion.   Coronary artery disease with calcified plaque involving the right and   left main coronary arteries and the left anterior descending artery.      Upper Abdomen: The upper abdomen is unremarkable.    Bones And Soft Tissues: Degenerative changes of the spine.  The soft   tissues are unremarkable.      IMPRESSION:   1.  No pulmonary embolism.  2. Emphysematous changes of the lung.    DIANA MEDINA M.D., RADIOLOGY RESIDENT  This document has been electronically signed.  JEYSON SANCHEZ M.D., ATTENDING RADIOLOGIST  This document has been electronically signed. Dec  4 2018  5:21PM      < end of copied text >  ---------------------------------------------------------------------------------------------------------------  < from: Xray Chest 1 View AP/PA (11.29.18 @ 14:53) >    EXAM:  XR CHEST AP OR PA 1V                          PROCEDURE DATE:  11/29/2018      INTERPRETATION:  A single chest x-ray was obtained on November 29, 2018.    Indication: Shortness of breath.    Impression:    The heart is normal in size. The lungs are clear. The visualized bones   are normal.    ALEX CUELLAR M.D., ATTENDING RADIOLOGIST  This document has been electronically signed. Nov 29 2018  2:59PM    < end of copied text >  ---------------------------------------------------------------------------------------------------------------  < from: [facility] Duplex Lower Ext Vein Scan, Bilat (12.06.18 @ 15:40) >    EXAM:  DUPLEX SCAN EXT VEINS LOWER BI                          PROCEDURE DATE:  12/06/2018      INTERPRETATION:  CLINICAL INFORMATION: Shortness of breath, leg pain and   swelling.    COMPARISON: Bilateral lower extremity venous duplex study dated 1/27/2009.    TECHNIQUE: Duplex sonography of the BILATERAL LOWER extremities with   color and spectral Doppler, with and without compression.      FINDINGS:    There is normal compressibility of the bilateral common femoral, femoral   and popliteal veins. No calf vein thrombosis is detected.    Doppler examination shows normal spontaneous and phasic flow.    IMPRESSION:     No evidence of bilateral lower extremity deep venous thrombosis.    JUSTIN ZAVALETA M.D., ATTENDING RADIOLOGIST  This document has been electronically signed. Dec  6 2018  4:03PM    < end of copied text >  ---------------------------------------------------------------------------------------------------------------    < from: [facility] Duplex Upper Ext Vein Scan, Bilat (12.12.18 @ 17:36) >    EXAM:  DUPLEX SCAN EXT VEINS UPPER BI                          PROCEDURE DATE:  12/12/2018      IMPRESSION:     No evidence of BILATERAL upper extremity deep venous thrombosis.    SNEHA KELSEY M.D., RADIOLOGY RESIDENT  This document has been electronically signed.  RAYMUNDO DUMONT M.D., ATTENDING RADIOLOGIST  This document has been electronically signed. Dec 13 2018  9:22AM      < end of copied text >  ---------------------------------------------------------------------------------------------------------------  SPIROMETRY:     FEV1 0.56 liters - 22% predicted  FVC 1.73 liters - 52% predicted  FEV1% 32    c/w very severe obstructive lung disease [facility]     PROGRESS NOTE    CHIEF COMPLAINT: chronic hypoxic respiratory failure; acute on chronic hypercapnic respiratory failure; COPD exacerbation; emphysema; dyspnea    INTERVAL HISTORY: hyponatremia; hyperkalemia; worsening renal function; on BIPAP due to worsening respiratory acidosis; off MS contin due to sedation; very frustrated about prolonged hospitalization and lack of clinical improvement; no shortness of breath at rest but severely dyspneic with minimal exertion; no cough, sputum production, chest congestion or wheeze; no fevers, chills or sweats; no chest pain/pressure or palpitations; improved glucose control; persistent extremity swelling upper > lower; sputum cultures without growth x 2; lower extremity weakness and pain    REVIEW OF SYSTEMS:  Constitutional: As per interval history  HEENT: Within normal limits  CV: As per interval history  Resp: As per interval history  GI: Within normal limits   : [place]  Musculoskeletal: lower extremity weakness and pain  Skin: Within normal limits  Neurological: Within normal limits  Psychiatric: Within normal limits  Endocrine: Within normal limits  Hematologic/Lymphatic: Within normal limits  Allergic/Immunologic: Within normal limits    MEDICATIONS:     Pulmonary "  ALBUTerol/ipratropium for Nebulization 3 milliLiter(s) Nebulizer <User Schedule>  buDESOnide   0.5 milliGRAM(s) Respule 0.5 milliGRAM(s) Inhalation every 12 hours  montelukast 10 milliGRAM(s) Oral daily  theophylline ER Capsule 400 milliGRAM(s) Oral daily      Anti-microbials:  nystatin    Suspension 314190 Unit(s) Oral four times a day      Cardiovascular:  amLODIPine   Tablet 5 milliGRAM(s) Oral daily  isosorbide   mononitrate ER Tablet (IMDUR) 30 milliGRAM(s) Oral daily      Other:  artificial tears (preservative free) Ophthalmic Solution 1 Drop(s) Both EYES three times a day  aspirin enteric coated 81 milliGRAM(s) Oral daily  Biotene Dry Mouth Oral Rinse 5 milliLiter(s) Swish and Spit two times a day  bisacodyl Suppository 10 milliGRAM(s) Rectal daily PRN  cholecalciferol 2000 Unit(s) Oral daily  dextrose 40% Gel 15 Gram(s) Oral once PRN  dextrose 5%. 1000 milliLiter(s) IV Continuous <Continuous>  dextrose 50% Injectable 12.5 Gram(s) IV Push once  dextrose 50% Injectable 25 Gram(s) IV Push once  dextrose 50% Injectable 25 Gram(s) IV Push once  docusate sodium 100 milliGRAM(s) Oral daily  enoxaparin Injectable 40 milliGRAM(s) SubCutaneous every 24 hours  glucagon  Injectable 1 milliGRAM(s) IntraMuscular once PRN  insulin glargine Injectable (LANTUS) 22 Unit(s) SubCutaneous at bedtime  insulin lispro (HumaLOG) corrective regimen sliding scale   SubCutaneous three times a day before meals  insulin lispro (HumaLOG) corrective regimen sliding scale   SubCutaneous at bedtime  insulin lispro Injectable (HumaLOG) 14 Unit(s) SubCutaneous before dinner  insulin lispro Injectable (HumaLOG) 12 Unit(s) SubCutaneous before breakfast  insulin lispro Injectable (HumaLOG) 12 Unit(s) SubCutaneous before lunch  methylPREDNISolone sodium succinate Injectable 20 milliGRAM(s) IV Push every 6 hours  multivitamin 1 Tablet(s) Oral daily  ondansetron Injectable 4 milliGRAM(s) IV Push every 8 hours PRN  pantoprazole    Tablet 40 milliGRAM(s) Oral before breakfast  polyethylene glycol 3350 17 Gram(s) Oral daily  rosuvastatin 10 milliGRAM(s) Oral at bedtime  senna 2 Tablet(s) Oral at bedtime  sodium chloride 0.65% Nasal 1 Spray(s) Both Nostrils two times a day PRN  sodium chloride 0.9%. 1000 milliLiter(s) IV Continuous <Continuous>        OBJECTIVE:    I&O's Detail    12 Dec 2018 07:01  -  13 Dec 2018 07:00  --------------------------------------------------------  IN:    Oral Fluid: 240 mL  Total IN: 240 mL    OUT:  Total OUT: 0 mL    Total NET: 240 mL    POCT Blood Glucose.: 110 mg/dL (13 Dec 2018 08:13)  POCT Blood Glucose.: 109 mg/dL (12 Dec 2018 22:53)  POCT Blood Glucose.: 228 mg/dL (12 Dec 2018 18:17)  POCT Blood Glucose.: 124 mg/dL (12 Dec 2018 11:35)      PHYSICAL EXAM:       ICU Vital Signs Last 24 Hrs  T(C): 36.5 (13 Dec 2018 08:03), Max: 36.5 (13 Dec 2018 08:03)  T(F): 97.7 (13 Dec 2018 08:03), Max: 97.7 (13 Dec 2018 08:03)  HR: 100 (13 Dec 2018 09:30) (82 - 102)  BP: 124/75 (13 Dec 2018 08:03) (123/60 - 134/74)  BP(mean): --  ABP: --  ABP(mean): --  RR: 15 (13 Dec 2018 08:03) (15 - 18)  SpO2: 97% (13 Dec 2018 09:30) (94% - 100%) on 40% FiO2 on BIPAP     General: Awake. Alert. Cooperative. No distress. Appears stated age. Obese. On BIPAP.	  HEENT:  Atraumatic. Normocephalic. Anicteric. Normal oral mucosa. PERRL. EOMI. BIPAP mask  Neck: Supple. Trachea midline. Thyroid without enlargement/tenderness/nodules. No carotid bruit. No JVD.	  Cardiovascular: Regular rate and rhythm. Distant S1 S2. No murmurs, rubs or gallops.  Respiratory: Respirations unlabored. Markedly decreased breath sounds throughout. Prolonged expiratory phase of respiration. No wheeze. No curvature.  Abdomen: Soft. Non-tender. Non-distended. No organomegaly. No masses. Normal bowel sounds. Obese.  Extremities: Warm to touch. No clubbing or cyanosis. Mild bilateral lower extremity edema up to the thigh. Moderate bilateral upper extremity swelling right > left  Pulses: Decreased lower extremity peripheral pulses.  Skin: No rashes or lesions. No ecchymoses. No cyanosis. Warm to touch.  Lymph Nodes: Cervical, supraclavicular and axillary nodes normal  Neurological: Motor and sensory examination equal and normal. A and O x 3  Psychiatry: Appropriate mood and affect.     LABS:                        13.8   18.59 )-----------( 190      ( 12 Dec 2018 07:49 )             40.4     12-13    128<L>  |  86<L>  |  66<H>  ----------------------------<  108<H>  5.8<H>   |  28  |  1.82<H>    12-12    127<L>  |  89<L>  |  57<H>  ----------------------------<  104<H>  6.0<H>   |  22  |  1.43<H>    Ca      8.9      12-13    Ca      8.8      12-12    Phos    3.8     12-11    Phos    3.0     12-10      Mg       2.4     12-11    Mg       2.1     12-10    ABG - ( 13 Dec 2018 06:29 )  pH: 7.30  /  pCO2: 71    /  pO2: 182   / HCO3: 34    / Base Excess: 5.8   /  SaO2: 99        ABG - ( 13 Dec 2018 00:59 )  pH: 7.32  /  pCO2: 71    /  pO2: 75    / HCO3: 35    / Base Excess: 7.1   /  SaO2: 95       ABG - ( 11 Dec 2018 11:45 )  pH: 7.39  /  pCO2: 54    /  pO2: 98    / HCO3: 32    / Base Excess: 6.2   /  SaO2: 98        ABG - ( 09 Dec 2018 11:26 )  pH: 7.35  /  pCO2: 63    /  pO2: 145   / HCO3: 34    / Base Excess: 6.6   /  SaO2: 99      ABG - ( 09 Dec 2018 00:28 )  pH: 7.32  /  pCO2: 71    /  pO2: 124   / HCO3: 36    / Base Excess: 7.6   /  SaO2: 99        ABG - ( 08 Dec 2018 20:50 )  pH: 7.32  /  pCO2: 72    /  pO2: 80    / HCO3: 36    / Base Excess: 7.7   /  SaO2: 95        < from: Transthoracic Echocardiogram (12.07.18 @ 08:59) >    Patient name: GUY HARTMAN  YOB: 1960   Age: 58 (F)   MR#: [id number]  Study Date: 12/7/2018  Location: [address] [place], [place] [id number]: Laquita Armando [facility]  Study quality: Technically good  Referring Physician: Allen ingram MD  BloodPressure: 120/80 mmHg  Height: 163 cm  Weight: 88 kg  BSA: 1.9 m2  ------------------------------------------------------------------------  PROCEDURE: Transthoracic echocardiogram with 2-D, M-Mode  and complete spectral and color flow Doppler.  INDICATION: Other forms of dyspnea (R06.09)  ------------------------------------------------------------------------  Dimensions:    Normal Values:  LA:     3.6    2.0 - 4.0 cm  Ao:     2.9    2.0 - 3.8 cm  SEPTUM: 0.9    0.6 - 1.2 cm  PWT:    0.8    0.6- 1.1 cm  LVIDd:  4.9    3.0 - 5.6 cm  LVIDs:  2.9    1.8 - 4.0 cm  Derived variables:  LVMI: 73 g/m2  RWT: 0.32  Fractional short: 40 %  EF (Teicholtz): 71 %  Doppler Peak Velocity (m/sec): AoV=1.2  ------------------------------------------------------------------------  Observations:  Mitral Valve: Normal mitral valve.  Aortic Valve/Aorta: Normal trileaflet aortic valve. Peak  transaortic valve gradient equals 6 mm Hg, mean transaortic  valve gradient equals 3 mm Hg, aortic valve velocity time  integral equals 22 cm. Trace aortic regurgitation.  Aortic Root: 2.9 cm.  Left Atrium: Normal left atrium.  LA volume index = 18  cc/m2.  Left Ventricle: Normal left ventricular systolic function.  No segmental wall motion abnormalities. Normal left  ventricular internal dimensions and wall thicknesses. Mild  diastolic dysfunction (Stage I).  Right Heart: Normal right atrium. Normal right ventricular  size and function. Normal tricuspid valve. Minimal  tricuspid regurgitation. Normal pulmonic valve.  Pericardium/Pleura: Normal pericardium with no pericardial  effusion.  Hemodynamic: Estimated right atrial pressure is 8 mm Hg.  Inadequate tricuspid regurgitation Doppler envelope  precludes estimation of RVSP.  ------------------------------------------------------------------------  Conclusions:  1. Normal mitral valve.  2. Normal trileaflet aortic valve. Peak transaortic valve  gradient equals 6 mm Hg, mean transaortic valve gradient  equals 3 mm Hg, aortic valve velocity time integral equals  22 cm. Trace aortic regurgitation.  3. Aortic Root: 2.9 cm.  4. Normal left atrium.  LA volume index = 18 cc/m2.  5. Normal left ventricular internal dimensions and wall  thicknesses.  6. Normal left ventricular systolic function. No segmental  wall motion abnormalities.  7. Mild diastolic dysfunction (Stage I).  8. Normal right ventricular size and function.  9. Inadequate tricuspid regurgitation Doppler envelope  precludes estimation of RVSP.  10. Normal tricuspid valve. Minimal tricuspid  regurgitation.  *** Compared with echocardiogram report of 2/18/2014, no  significant changes noted.  ------------------------------------------------------------------------  Confirmed on  12/7/2018 - 13:49:43 by Shefali Gómez M.D.  ------------------------------------------------------------------------    < end of copied text >  ---------------------------------------------------------------------------------------------------------------    MICROBIOLOGY:     Culture - Sputum . (12.07.18 @ 08:34)    Gram Stain:   Rare polymorphonuclear leukocytes per low power field  Rare Squamous epithelial cells per low power field  Numerous Gram Positive Cocci in Pairs and Chains per oil power field    Specimen Source: .Sputum Sputum    Culture Results:   Normal Respiratory Samaria present    Culture - Sputum . (12.05.18 @ 22:50)    Gram Stain:   Moderate polymorphonuclear leukocytes per low power field  Few Squamous epithelial cells per low power field  Moderate Gram Positive Cocci in Pairs and Chains per oil power field    Specimen Source: .Sputum Sputum    Culture Results:   Normal Respiratory Samaria present    Rapid Respiratory Viral Panel (11.30.18 @ 01:30)    Rapid RVP Result: NotDete: The [employer] RVP Rapid uses polymerase chain reaction (PCR) and melt  curve analysis to screen for adenovirus; coronavirus HKU1, NL63, 229E,  OC43; human metapneumovirus (hMPV); human enterovirus/rhinovirus  (Entero/RV); influenza A; influenza A/H1;influenza A/H3; influenza  A/H1-2009; influenza B; parainfluenza viruses 1, 2, 3, 4; respiratory  syncytial virus; Bordetella pertussis; Mycoplasma pneumoniae; and  Chlamydophila pneumoniae.    RADIOLOGY:  [x] Chest radiographs reviewed and interpreted by me    < from: CT Angio Chest w/ IV Cont (12.04.18 @ 16:30) >    EXAM:  CT ANGIO CHEST (W)AW IC                          PROCEDURE DATE:  12/04/2018      INTERPRETATION:  CT CHEST WITH CONTRAST    INDICATION: Known COPD not responding to steroids and bronchodilators.   Progressively worsening dyspnea. Evaluate for pulmonary embolism.    TECHNIQUE: Enhanced helical images were obtained of the chest. Coronal   and sagittal images were reconstructed. Maximum intensity projection   images were generated. Images were obtained after the uneventful   administration of 60 cc nonionic intravenous Omnipaque 350.  40 cc of   Omnipaque 350 was discarded.    COMPARISON: CT chest 2/18/2014.    FINDINGS:     Lungs And Airways: Emphysematous changes of the lung.      The airways are unremarkable.      Pleura: No pneumothorax. No pleural effusion.    Mediastinum: There are no enlarged chest lymph nodes. The visualized   portion of the thyroid gland is unremarkable.       Heart and Vasculature: No pulmonary embolism.    The main pulmonary artery is normal in caliber. No thoracic aortic   aneurysm or dissection. Atheromatous disease of the aorta.    The heart is normal in size.  There is no pericardial effusion.   Coronary artery disease with calcified plaque involving the right and   left main coronary arteries and the left anterior descending artery.      Upper Abdomen: The upper abdomen is unremarkable.    Bones And Soft Tissues: Degenerative changes of the spine.  The soft   tissues are unremarkable.      IMPRESSION:   1.  No pulmonary embolism.  2. Emphysematous changes of the lung.    DIANA MEDINA M.D., RADIOLOGY RESIDENT  This document has been electronically signed.  JEYSON SANCHEZ M.D., ATTENDING RADIOLOGIST  This document has been electronically signed. Dec  4 2018  5:21PM      < end of copied text >  ---------------------------------------------------------------------------------------------------------------  < from: Xray Chest 1 View AP/PA (11.29.18 @ 14:53) >    EXAM:  XR CHEST AP OR PA 1V                          PROCEDURE DATE:  11/29/2018      INTERPRETATION:  A single chest x-ray was obtained on November 29, 2018.    Indication: Shortness of breath.    Impression:    The heart is normal in size. The lungs are clear. The visualized bones   are normal.    ALEX CUELLAR M.D., ATTENDING RADIOLOGIST  This document has been electronically signed. Nov 29 2018  2:59PM    < end of copied text >  ---------------------------------------------------------------------------------------------------------------  < from: [facility] Duplex Lower Ext Vein Scan, Bilat (12.06.18 @ 15:40) >    EXAM:  DUPLEX SCAN EXT VEINS LOWER BI                          PROCEDURE DATE:  12/06/2018      INTERPRETATION:  CLINICAL INFORMATION: Shortness of breath, leg pain and   swelling.    COMPARISON: Bilateral lower extremity venous duplex study dated 1/27/2009.    TECHNIQUE: Duplex sonography of the BILATERAL LOWER extremities with   color and spectral Doppler, with and without compression.      FINDINGS:    There is normal compressibility of the bilateral common femoral, femoral   and popliteal veins. No calf vein thrombosis is detected.    Doppler examination shows normal spontaneous and phasic flow.    IMPRESSION:     No evidence of bilateral lower extremity deep venous thrombosis.    JUSTIN ZAVALETA M.D., ATTENDING RADIOLOGIST  This document has been electronically signed. Dec  6 2018  4:03PM    < end of copied text >  ---------------------------------------------------------------------------------------------------------------    < from: [facility] Duplex Upper Ext Vein Scan, Bilat (12.12.18 @ 17:36) >    EXAM:  DUPLEX SCAN EXT VEINS UPPER BI                          PROCEDURE DATE:  12/12/2018      IMPRESSION:     No evidence of BILATERAL upper extremity deep venous thrombosis.    SNEHA KELSEY M.D., RADIOLOGY RESIDENT  This document has been electronically signed.  RAYMUNDO DUMONT M.D., ATTENDING RADIOLOGIST  This document has been electronically signed. Dec 13 2018  9:22AM      < end of copied text >  ---------------------------------------------------------------------------------------------------------------  SPIROMETRY:     FEV1 0.56 liters - 22% predicted  FVC 1.73 liters - 52% predicted  FEV1% 32    c/w very severe obstructive lung disease

## 2018-12-13 NOTE — PROGRESS NOTE ADULT - PROBLEM SELECTOR PLAN 6
Dopplers negative for DVT.  Echo report noted, mild diastolic dysfunction, no significant changes from prior study in 2014.  Suspect leg edema may be related to recent high dose steroids.  C/w compression stockings   Need to monitor for any worsening leg edema with initiation of Norvasc.  Pt with b/l UE edema likely in the setting of steroid use, dopplers negative for dvt

## 2018-12-13 NOTE — PROGRESS NOTE ADULT - PROBLEM SELECTOR PLAN 5
Thought to be due to secondary to Benicar and hyperglycemia.  Benicar has been discontinued.  Continue Norvasc 5 mg daily.  Glycemic control as above.  Pt with metabolic alk 2/2 chronic resp acidosis   s/p diamox and kayxelate 12/8   d/w renal appreciate recs, will diurese with lasix 40mg IV x1 today

## 2018-12-13 NOTE — PROGRESS NOTE ADULT - SUBJECTIVE AND OBJECTIVE BOX
Contact info:   Dony Metz MD  pager [phone], please provide 10 digit call back number.   Please note that this patient may be followed by another provider tomorrow.   If no answer or after hours, please contact [phone]    Interval Hx:   57 y/o F w/h/o controlled T2DM on Metformin 500mg bid. Also h/o CAD and COPD. Here with COPD exacerbation on Methylprednisolone> remains on 20mg q6h.  She is now on bipap.  She's very upset that her breathing is worse.  Also states that her kidney function is getting worse also, very worried.   Her meal time insulin is held for breakfast and lunch.  She did eat lunch.      MEDICATIONS  (STANDING):  ALBUTerol/ipratropium for Nebulization 3 milliLiter(s) Nebulizer <User Schedule>  amLODIPine   Tablet 5 milliGRAM(s) Oral daily  artificial tears (preservative free) Ophthalmic Solution 1 Drop(s) Both EYES three times a day  aspirin enteric coated 81 milliGRAM(s) Oral daily  Biotene Dry Mouth Oral Rinse 5 milliLiter(s) Swish and Spit two times a day  buDESOnide   0.5 milliGRAM(s) Respule 0.5 milliGRAM(s) Inhalation every 12 hours  cholecalciferol 2000 Unit(s) Oral daily  docusate sodium 100 milliGRAM(s) Oral daily  enoxaparin Injectable 40 milliGRAM(s) SubCutaneous every 24 hours  insulin glargine Injectable (LANTUS) 22 Unit(s) SubCutaneous at bedtime  insulin lispro (HumaLOG) corrective regimen sliding scale   SubCutaneous three times a day before meals  insulin lispro (HumaLOG) corrective regimen sliding scale   SubCutaneous at bedtime  insulin lispro Injectable (HumaLOG) 14 Unit(s) SubCutaneous before dinner  insulin lispro Injectable (HumaLOG) 12 Unit(s) SubCutaneous before breakfast  insulin lispro Injectable (HumaLOG) 12 Unit(s) SubCutaneous before lunch  isosorbide   mononitrate ER Tablet (IMDUR) 30 milliGRAM(s) Oral daily  melatonin 3 milliGRAM(s) Oral at bedtime  methylPREDNISolone sodium succinate Injectable 20 milliGRAM(s) IV Push every 6 hours  montelukast 10 milliGRAM(s) Oral daily  multivitamin 1 Tablet(s) Oral daily  nystatin    Suspension 838593 Unit(s) Oral four times a day  pantoprazole    Tablet 40 milliGRAM(s) Oral before breakfast  polyethylene glycol 3350 17 Gram(s) Oral daily  rosuvastatin 10 milliGRAM(s) Oral at bedtime  senna 2 Tablet(s) Oral at bedtime  theophylline ER Capsule 400 milliGRAM(s) Oral daily    Allergies    No Known Allergies    Review of Systems:  General: as above  CV: No CP  GI: Tolerating POs without any N/V/D/ABD PAIN.  Resp: NIELSON on and off.   ENDO: No S&Sx of hypoglycemia  ALL OTHER SYSTEMS REVIEWED AND NEGATIVE    PHYSICAL EXAM:  VITALS: T(C): 36.5 (12-13-18 @ 08:03)  T(F): 97.7 (12-13-18 @ 08:03), Max: 97.7 (12-13-18 @ 08:03)  HR: 90 (12-13-18 @ 12:01) (82 - 102)  BP: 124/75 (12-13-18 @ 08:03) (123/60 - 124/75)  RR:  (15 - 18)  SpO2:  (96% - 100%)  Wt(kg): --  GENERAL: NAD, well-groomed, well-developed  EYES: No proptosis, no lid lag, anicteric  HEENT:  Atraumatic, Normocephalic, moist mucous membranes, wearing BIPAP   RESPIRATORY: Clear to auscultation bilaterally in the back, shallow breath, no wheezing  CARDIOVASCULAR: Regular rate and rhythm; No murmurs; no peripheral edema  GI: Soft, nontender, non distended, normal bowel sounds  PSYCH: Alert and oriented x 3, normal affect, normal mood      POCT Blood Glucose.: 88 mg/dL (12-13-18 @ 13:17)  POCT Blood Glucose.: 110 mg/dL (12-13-18 @ 08:13)  POCT Blood Glucose.: 109 mg/dL (12-12-18 @ 22:53)  POCT Blood Glucose.: 228 mg/dL (12-12-18 @ 18:17)  POCT Blood Glucose.: 124 mg/dL (12-12-18 @ 11:35)  POCT Blood Glucose.: 106 mg/dL (12-12-18 @ 08:39)  POCT Blood Glucose.: 94 mg/dL (12-11-18 @ 22:08)  POCT Blood Glucose.: 154 mg/dL (12-11-18 @ 17:19)  POCT Blood Glucose.: 134 mg/dL (12-11-18 @ 12:11)  POCT Blood Glucose.: 136 mg/dL (12-11-18 @ 08:17)  POCT Blood Glucose.: 116 mg/dL (12-10-18 @ 22:12)  POCT Blood Glucose.: 125 mg/dL (12-10-18 @ 19:41)  POCT Blood Glucose.: 87 mg/dL (12-10-18 @ 17:20)      12-13    130<L>  |  89<L>  |  60<H>  ----------------------------<  80  6.1<H>   |  30  |  1.56<H>    EGFR if : 41<L>  EGFR if non : 36<L>    Ca    9.7      12-13  Mg     2.3     12-13  Phos  4.1     12-13          Thyroid Function Tests:      Hemoglobin A1C, Whole Blood: 7.2 % <H> [4.0 - 5.6] (11-30-18 @ 07:58)

## 2018-12-13 NOTE — PROGRESS NOTE ADULT - PROBLEM SELECTOR PLAN 1
-Has signifcantly decreased PO, lunch time glucose 88mg/dl, no meal time insulin given for breakfast.  Will further decrease Lantus to 20 units  -test BG AC/HS  -Decrease Lantus dsoe to 22 units QHS  -Will decrease Humalog to 10/10/12  -c/w Humalog moderate correction scale AC and Mod HS scale  -Please notify endocrine when steroids further tapered. Will need adjustment to insulin regimen.

## 2018-12-13 NOTE — PROGRESS NOTE ADULT - PROBLEM SELECTOR PLAN 4
Pt with uptrending cr   check bladder scan   check ua, urine lytes  dc ms contin  check cxr to assess volume status and check bnp   d/w renal will give lasix 40mg x1 today and reassess

## 2018-12-13 NOTE — CONSULT NOTE ADULT - ATTENDING COMMENTS
58yrs, COPD, 3L home O2 for past 3yrs. HTN, Lipds,   CAD: NSTEMI 2016. Staged PCI mid lad, RCA, DM2. PVD.   Recurrent admissions for COPD. By report evaluated for lung transplant at [place] but removed from list after NSTEMI.  Now admitted for COPD exacerbation Nov 29th. Steriod boost, Antibiotics- now off. Bronchodilators. Started on BiPAP  Vascular cardiology has evaluated patient for LE edema. No venous insufficiency.  LE dopplers negatve  meds: Amlodipine 5, Solumedrol 20, Insulin, Inhalers, Imdur 30 no diuretics.   outpatient: HCTZ 12.5, imdur 30, olmasartan 20.   TTE 12.7: LA 3.6, LVEDD 4.9, LVEF hyperdynamic, RV normal size and function. min TR.   [facility] 2017: RA 18, RV 36/18, PA 38/21 29, PCWP 23, PA 62 Real 5.0/2.6  Afebrile 97.7, HR 83-102SR, 110/-124/  ABG 12.13: 7.3/70/182/99  12-13  130<L>  |  89<L>  |  60<H>  ----------------------------<  80  6.1<H>   |  30  |  1.56<H>    Ca    9.7      13 Dec 2018 14:00  Phos  4.1     12-13  Mg     2.3     12-13                          14.2   16.5  )-----------( 183      ( 13 Dec 2018 14:00 )             42.3 58yrs, COPD, 3L home O2 for past 3yrs. HTN, Lipds,   CAD: NSTEMI 2016. Staged PCI mid lad, RCA, DM2. PVD.   Recurrent admissions for COPD. By report evaluated for lung transplant at [place] but removed from list after NSTEMI.  Now admitted for COPD exacerbation Nov 29th. Steriod boost, Antibiotics- now off. Bronchodilators. Started on BiPAP  Vascular cardiology has evaluated patient for LE edema. No venous insufficiency.  LE dopplers negatve  meds: Amlodipine 5, Solumedrol 20, Insulin, Inhalers, Imdur 30 no diuretics.   outpatient: HCTZ 12.5, imdur 30, olmasartan 20.   TTE 12.7: LA 3.6, LVEDD 4.9, LVEF hyperdynamic, RV normal size and function. min TR.   [facility]: RA 18, RV 36/18, PA 38/21 29, PCWP 23, PA 62 Real 5.0/2.6  Afebrile 97.7, HR 83-102SR, 110/-124/  ABG 12.13: 7.3/70/182/99  CXR: clear lungs  EKG: SR  RBBB  12-13  130<L>  |  89<L>  |  60<H>  ----------------------------<  80  6.1<H>   |  30  |  1.56<H>    Ca    9.7      13 Dec 2018 14:00  Phos  4.1     12-13  Mg     2.3     12-13                        14.2   16.5  )-----------( 183      ( 13 Dec 2018 14:00 )             42.3 58yrs, COPD, 3L home O2 for past 3yrs. HTN, Lipds,   CAD: NSTEMI 2016. Staged PCI mid lad, RCA, DM2. PVD.   Recurrent admissions for COPD. By report evaluated for lung transplant at [place] but removed from list after NSTEMI.  Now admitted for COPD exacerbation Nov 29th. Steriod boost, Antibiotics- now off. Bronchodilators. Started on BiPAP  Vascular cardiology has evaluated patient for LE edema. No venous insufficiency.  LE dopplers negatve  meds: Amlodipine 5, Solumedrol 20, Insulin, Inhalers, Imdur 30 no diuretics.   outpatient: HCTZ 12.5, imdur 30, olmasartan 20.   TTE 12.7: LA 3.6, LVEDD 4.9, LVEF hyperdynamic, RV normal size and function. min TR.   [facility] 2017: RA 18, RV 36/18, PA 38/21 29, PCWP 23, PA 62 Real 5.0/2.6  Angiogram : Non obstructive CAD, LVEDP 22  Afebrile 97.7, HR 83-102SR, 110/-124/  ABG 12.13: 7.3/70/182/99  CXR: clear lungs  EKG: SR  RBBB  12-13  130<L>  |  89<L>  |  60<H>  ----------------------------<  80  6.1<H>   |  30  |  1.56<H>    Ca    9.7      13 Dec 2018 14:00  Phos  4.1     12-13  Mg     2.3     12-13                        14.2   16.5  )-----------( 183      ( 13 Dec 2018 14:00 )             42.3  58yrs with end stage lung disease. Is currently being evaluated for lung transplant at [place]. No active heart failure issues. RV function preserved on repeated studies. Dr Fair is in touch with [facility] to see if evaluation can be expedited. Note elevated K and renal recommendation. Would consider reinstitution of HCZ, Would avoid fluids in this patient who has had right heart failure in the past (per Dr Chapa).   Ehsan Galaviz

## 2018-12-13 NOTE — CHART NOTE - NSCHARTNOTEFT_GEN_A_CORE
MEDICINE GUY JUNG  Patient is a 58 year old female who presents with a chief complaint of dyspnea. (13 Dec 2018 20:31)       Event Summary:  Called by RN to report patient with fingerstick of 83 after eating a few bites of salad for dinner this evening.      CAPILLARY BLOOD GLUCOSE  POCT Blood Glucose.: 83 mg/dL (13 Dec 2018 20:58)  POCT Blood Glucose.: 88 mg/dL (13 Dec 2018 13:17)  POCT Blood Glucose.: 110 mg/dL (13 Dec 2018 08:13)      HPI:  Patient is a 58 year old female with a PMHx of COPD (on home O2 3L), HTN, HLD, CAD (S/P x6 stents), DM2, pHTN and PVD who presented with progressive worsening dyspnea x 2 weeks. (29 Nov 2018 21:28)  Now with hypoglycemia.      PLAN: Hypoglycemia  - Per previous endocrinology orders, Humalog before meals to be decreased 50% if patient is with poor PO intake  - Dinnertime Humalog decreased from 12 units to 6 units tonight  - Lantus decreased 20% (20 units --> 16 units) to reduce risk of hypoglycemia  - Will check fingerstick at 02:00  - Endocrinology following patient  - Will continue to monitor closely  - Primary team to follow up in AM      #[id number]  Oly Chaparro PA-C  Medicine Department

## 2018-12-14 DIAGNOSIS — I50.9 HEART FAILURE, UNSPECIFIED: ICD-10-CM

## 2018-12-14 LAB
ANION GAP SERPL CALC-SCNC: 10 MMOL/L — SIGNIFICANT CHANGE UP (ref 5–17)
ANION GAP SERPL CALC-SCNC: 9 MMOL/L — SIGNIFICANT CHANGE UP (ref 5–17)
BUN SERPL-MCNC: 57 MG/DL — HIGH (ref 7–23)
BUN SERPL-MCNC: 63 MG/DL — HIGH (ref 7–23)
CALCIUM SERPL-MCNC: 8.8 MG/DL — SIGNIFICANT CHANGE UP (ref 8.4–10.5)
CALCIUM SERPL-MCNC: 9 MG/DL — SIGNIFICANT CHANGE UP (ref 8.4–10.5)
CHLORIDE SERPL-SCNC: 86 MMOL/L — LOW (ref 96–108)
CHLORIDE SERPL-SCNC: 88 MMOL/L — LOW (ref 96–108)
CO2 SERPL-SCNC: 35 MMOL/L — HIGH (ref 22–31)
CO2 SERPL-SCNC: 35 MMOL/L — HIGH (ref 22–31)
CREAT SERPL-MCNC: 1.44 MG/DL — HIGH (ref 0.5–1.3)
CREAT SERPL-MCNC: 1.72 MG/DL — HIGH (ref 0.5–1.3)
GAS PNL BLDA: SIGNIFICANT CHANGE UP
GLUCOSE BLDC GLUCOMTR-MCNC: 105 MG/DL — HIGH (ref 70–99)
GLUCOSE BLDC GLUCOMTR-MCNC: 122 MG/DL — HIGH (ref 70–99)
GLUCOSE BLDC GLUCOMTR-MCNC: 157 MG/DL — HIGH (ref 70–99)
GLUCOSE BLDC GLUCOMTR-MCNC: 203 MG/DL — HIGH (ref 70–99)
GLUCOSE BLDC GLUCOMTR-MCNC: 38 MG/DL — CRITICAL LOW (ref 70–99)
GLUCOSE BLDC GLUCOMTR-MCNC: 42 MG/DL — CRITICAL LOW (ref 70–99)
GLUCOSE BLDC GLUCOMTR-MCNC: 87 MG/DL — SIGNIFICANT CHANGE UP (ref 70–99)
GLUCOSE SERPL-MCNC: 123 MG/DL — HIGH (ref 70–99)
GLUCOSE SERPL-MCNC: 202 MG/DL — HIGH (ref 70–99)
HCT VFR BLD CALC: 40.7 % — SIGNIFICANT CHANGE UP (ref 34.5–45)
HGB BLD-MCNC: 13.7 G/DL — SIGNIFICANT CHANGE UP (ref 11.5–15.5)
MAGNESIUM SERPL-MCNC: 2.3 MG/DL — SIGNIFICANT CHANGE UP (ref 1.6–2.6)
MCHC RBC-ENTMCNC: 29 PG — SIGNIFICANT CHANGE UP (ref 27–34)
MCHC RBC-ENTMCNC: 33.7 GM/DL — SIGNIFICANT CHANGE UP (ref 32–36)
MCV RBC AUTO: 85.8 FL — SIGNIFICANT CHANGE UP (ref 80–100)
PHOSPHATE SERPL-MCNC: 3.7 MG/DL — SIGNIFICANT CHANGE UP (ref 2.5–4.5)
PLATELET # BLD AUTO: 189 K/UL — SIGNIFICANT CHANGE UP (ref 150–400)
POTASSIUM SERPL-MCNC: 4.8 MMOL/L — SIGNIFICANT CHANGE UP (ref 3.5–5.3)
POTASSIUM SERPL-MCNC: 5.4 MMOL/L — HIGH (ref 3.5–5.3)
POTASSIUM SERPL-SCNC: 4.8 MMOL/L — SIGNIFICANT CHANGE UP (ref 3.5–5.3)
POTASSIUM SERPL-SCNC: 5.4 MMOL/L — HIGH (ref 3.5–5.3)
RBC # BLD: 4.74 M/UL — SIGNIFICANT CHANGE UP (ref 3.8–5.2)
RBC # FLD: 13.4 % — SIGNIFICANT CHANGE UP (ref 10.3–14.5)
SODIUM SERPL-SCNC: 131 MMOL/L — LOW (ref 135–145)
SODIUM SERPL-SCNC: 132 MMOL/L — LOW (ref 135–145)
WBC # BLD: 15.4 K/UL — HIGH (ref 3.8–10.5)
WBC # FLD AUTO: 15.4 K/UL — HIGH (ref 3.8–10.5)

## 2018-12-14 PROCEDURE — 99233 SBSQ HOSP IP/OBS HIGH 50: CPT | Mod: GC

## 2018-12-14 PROCEDURE — 99232 SBSQ HOSP IP/OBS MODERATE 35: CPT

## 2018-12-14 RX ORDER — FUROSEMIDE 40 MG
40 TABLET ORAL ONCE
Qty: 0 | Refills: 0 | Status: COMPLETED | OUTPATIENT
Start: 2018-12-14 | End: 2018-12-14

## 2018-12-14 RX ORDER — SODIUM POLYSTYRENE SULFONATE 4.1 MEQ/G
15 POWDER, FOR SUSPENSION ORAL ONCE
Qty: 0 | Refills: 0 | Status: COMPLETED | OUTPATIENT
Start: 2018-12-14 | End: 2018-12-14

## 2018-12-14 RX ORDER — INSULIN GLARGINE 100 [IU]/ML
8 INJECTION, SOLUTION SUBCUTANEOUS ONCE
Qty: 0 | Refills: 0 | Status: COMPLETED | OUTPATIENT
Start: 2018-12-14 | End: 2018-12-14

## 2018-12-14 RX ORDER — LANOLIN ALCOHOL/MO/W.PET/CERES
1 CREAM (GRAM) TOPICAL AT BEDTIME
Qty: 0 | Refills: 0 | Status: DISCONTINUED | OUTPATIENT
Start: 2018-12-14 | End: 2019-01-15

## 2018-12-14 RX ORDER — INSULIN GLARGINE 100 [IU]/ML
16 INJECTION, SOLUTION SUBCUTANEOUS AT BEDTIME
Qty: 0 | Refills: 0 | Status: DISCONTINUED | OUTPATIENT
Start: 2018-12-14 | End: 2018-12-15

## 2018-12-14 RX ADMIN — Medication 2: at 08:59

## 2018-12-14 RX ADMIN — Medication 100 MILLIGRAM(S): at 13:15

## 2018-12-14 RX ADMIN — Medication 12 UNIT(S): at 17:47

## 2018-12-14 RX ADMIN — AMLODIPINE BESYLATE 5 MILLIGRAM(S): 2.5 TABLET ORAL at 06:38

## 2018-12-14 RX ADMIN — INSULIN GLARGINE 8 UNIT(S): 100 INJECTION, SOLUTION SUBCUTANEOUS at 23:29

## 2018-12-14 RX ADMIN — ENOXAPARIN SODIUM 40 MILLIGRAM(S): 100 INJECTION SUBCUTANEOUS at 22:33

## 2018-12-14 RX ADMIN — Medication 10 UNIT(S): at 08:59

## 2018-12-14 RX ADMIN — Medication 3 MILLILITER(S): at 06:39

## 2018-12-14 RX ADMIN — Medication 3 MILLILITER(S): at 10:48

## 2018-12-14 RX ADMIN — SENNA PLUS 2 TABLET(S): 8.6 TABLET ORAL at 22:33

## 2018-12-14 RX ADMIN — Medication 3 MILLILITER(S): at 17:47

## 2018-12-14 RX ADMIN — Medication 1 DROP(S): at 06:38

## 2018-12-14 RX ADMIN — Medication 1 TABLET(S): at 13:16

## 2018-12-14 RX ADMIN — Medication 3 MILLILITER(S): at 14:40

## 2018-12-14 RX ADMIN — Medication 400 MILLIGRAM(S): at 08:59

## 2018-12-14 RX ADMIN — Medication 3 MILLILITER(S): at 22:33

## 2018-12-14 RX ADMIN — SODIUM POLYSTYRENE SULFONATE 15 GRAM(S): 4.1 POWDER, FOR SUSPENSION ORAL at 06:39

## 2018-12-14 RX ADMIN — ONDANSETRON 4 MILLIGRAM(S): 8 TABLET, FILM COATED ORAL at 12:07

## 2018-12-14 RX ADMIN — Medication 1 DROP(S): at 13:16

## 2018-12-14 RX ADMIN — Medication 2000 UNIT(S): at 13:15

## 2018-12-14 RX ADMIN — ONDANSETRON 4 MILLIGRAM(S): 8 TABLET, FILM COATED ORAL at 22:32

## 2018-12-14 RX ADMIN — Medication 1 DROP(S): at 22:33

## 2018-12-14 RX ADMIN — Medication 0.5 MILLIGRAM(S): at 06:39

## 2018-12-14 RX ADMIN — PANTOPRAZOLE SODIUM 40 MILLIGRAM(S): 20 TABLET, DELAYED RELEASE ORAL at 06:38

## 2018-12-14 RX ADMIN — Medication 40 MILLIGRAM(S): at 17:47

## 2018-12-14 RX ADMIN — Medication 81 MILLIGRAM(S): at 13:16

## 2018-12-14 RX ADMIN — Medication 40 MILLIGRAM(S): at 12:07

## 2018-12-14 RX ADMIN — MONTELUKAST 10 MILLIGRAM(S): 4 TABLET, CHEWABLE ORAL at 13:15

## 2018-12-14 RX ADMIN — ISOSORBIDE MONONITRATE 30 MILLIGRAM(S): 60 TABLET, EXTENDED RELEASE ORAL at 13:15

## 2018-12-14 RX ADMIN — Medication 0.5 MILLIGRAM(S): at 17:48

## 2018-12-14 NOTE — PROGRESS NOTE ADULT - PROBLEM SELECTOR PLAN 8
Pt with hx of right sided heart failure   c/w IV diuresis   heart failure follow up   judicious use of IVF

## 2018-12-14 NOTE — PROGRESS NOTE ADULT - SUBJECTIVE AND OBJECTIVE BOX
[facility] DIVISION OF KIDNEY DISEASES AND HYPERTENSION -- FOLLOW UP NOTE  --------------------------------------------------------------------------------  Chief Complaint:  hyperkalemia    24 hour events/subjective:  Pt upset that she is having many issues. Continues to worry she was      PAST HISTORY  --------------------------------------------------------------------------------  No significant changes to PMH, PSH, FHx, SHx, unless otherwise noted    ALLERGIES & MEDICATIONS  --------------------------------------------------------------------------------  Allergies    No Known Allergies    Intolerances      Standing Inpatient Medications  ALBUTerol/ipratropium for Nebulization 3 milliLiter(s) Nebulizer <User Schedule>  amLODIPine   Tablet 5 milliGRAM(s) Oral daily  artificial tears (preservative free) Ophthalmic Solution 1 Drop(s) Both EYES three times a day  aspirin enteric coated 81 milliGRAM(s) Oral daily  Biotene Dry Mouth Oral Rinse 5 milliLiter(s) Swish and Spit two times a day  buDESOnide   0.5 milliGRAM(s) Respule 0.5 milliGRAM(s) Inhalation every 12 hours  cholecalciferol 2000 Unit(s) Oral daily  dextrose 5%. 1000 milliLiter(s) IV Continuous <Continuous>  dextrose 50% Injectable 12.5 Gram(s) IV Push once  dextrose 50% Injectable 25 Gram(s) IV Push once  dextrose 50% Injectable 25 Gram(s) IV Push once  docusate sodium 100 milliGRAM(s) Oral daily  enoxaparin Injectable 40 milliGRAM(s) SubCutaneous every 24 hours  insulin glargine Injectable (LANTUS) 16 Unit(s) SubCutaneous at bedtime  insulin lispro (HumaLOG) corrective regimen sliding scale   SubCutaneous three times a day before meals  insulin lispro (HumaLOG) corrective regimen sliding scale   SubCutaneous at bedtime  insulin lispro Injectable (HumaLOG) 10 Unit(s) SubCutaneous before lunch  insulin lispro Injectable (HumaLOG) 10 Unit(s) SubCutaneous before breakfast  insulin lispro Injectable (HumaLOG) 12 Unit(s) SubCutaneous with dinner  isosorbide   mononitrate ER Tablet (IMDUR) 30 milliGRAM(s) Oral daily  melatonin 1 milliGRAM(s) Oral at bedtime  montelukast 10 milliGRAM(s) Oral daily  multivitamin 1 Tablet(s) Oral daily  nystatin    Suspension 904266 Unit(s) Oral four times a day  pantoprazole    Tablet 40 milliGRAM(s) Oral before breakfast  polyethylene glycol 3350 17 Gram(s) Oral daily  predniSONE   Tablet 40 milliGRAM(s) Oral daily  senna 2 Tablet(s) Oral at bedtime  theophylline ER Capsule 400 milliGRAM(s) Oral daily    PRN Inpatient Medications  bisacodyl Suppository 10 milliGRAM(s) Rectal daily PRN  dextrose 40% Gel 15 Gram(s) Oral once PRN  glucagon  Injectable 1 milliGRAM(s) IntraMuscular once PRN  ondansetron Injectable 4 milliGRAM(s) IV Push every 8 hours PRN  sodium chloride 0.65% Nasal 1 Spray(s) Both Nostrils two times a day PRN      REVIEW OF SYSTEMS  --------------------------------------------------------------------------------  Gen: no fatigue, fevers/chills, weakness  Skin: No rashes  Respiratory: No dyspnea, cough, wheezing, hemoptysis  CV: No chest pain, PND, orthopnea  GI: No abdominal pain, diarrhea, constipation, nausea, vomiting  : No dysuria, hematuria  MSK: No joint pain/swelling; no edema  Neuro: no confusion    VITALS/PHYSICAL EXAM  --------------------------------------------------------------------------------  T(C): 36.5 (12-14-18 @ 14:44), Max: 36.6 (12-14-18 @ 06:36)  HR: 96 (12-14-18 @ 14:44) (87 - 105)  BP: 103/66 (12-14-18 @ 14:44) (102/64 - 115/70)  RR: 20 (12-14-18 @ 14:44) (18 - 20)  SpO2: 96% (12-14-18 @ 14:44) (95% - 98%)  Wt(kg): --        12-13-18 @ 07:01  -  12-14-18 @ 07:00  --------------------------------------------------------  IN: 660 mL / OUT: 1500 mL / NET: -840 mL      Physical Exam:  	Gen: NAD, well-appearing  	HEENT: supple neck; moist oral mucosa  	Pulm: CTA B/L  	CV: RRR, S1S2; no rub  	Back: No spinal or CVA tenderness; no sacral edema  	Abd: +BS, soft, nontender/nondistended  	: No suprapubic tenderness  	UE: Warm, FROM, no clubbing, intact strength; no edema  	LE: Warm, FROM, no clubbing, intact strength; no edema  	Neuro: No focal deficits  	Psych: Normal affect and mood  	Skin: Warm, without rashes  	Vascular access:    LABS/STUDIES  --------------------------------------------------------------------------------              13.7   15.4  >-----------<  189      [12-14-18 @ 13:58]              40.7     131  |  86  |  57  ----------------------------<  202      [12-14-18 @ 13:58]  4.8   |  35  |  1.44        Ca     8.8     [12-14-18 @ 13:58]      Mg     2.3     [12-14-18 @ 13:58]      Phos  3.7     [12-14-18 @ 13:58]            Creatinine Trend:  SCr 1.44 [12-14 @ 13:58]  SCr 1.72 [12-14 @ 03:28]  SCr 1.56 [12-13 @ 14:00]  SCr 1.82 [12-13 @ 01:53]  SCr 1.43 [12-12 @ 07:02]    Urinalysis - [12-13-18 @ 16:09]      Color Yellow / Appearance Clear / SG 1.022 / pH 5.5      Gluc Negative / Ketone Negative  / Bili Negative / Urobili Negative       Blood Negative / Protein Negative / Leuk Est Negative / Nitrite Negative      RBC  / WBC  / Hyaline  / Gran  / Sq Epi  / Non Sq Epi  / Bacteria     Urine Creatinine 76      [12-13-18 @ 14:06]  Urine Sodium 20      [12-13-18 @ 14:06]  Urine Potassium 50      [12-13-18 @ 14:06]    HbA1c 7.2      [11-30-18 @ 07:58] [facility] DIVISION OF KIDNEY DISEASES AND HYPERTENSION -- FOLLOW UP NOTE  --------------------------------------------------------------------------------  Chief Complaint:  hyperkalemia    24 hour events/subjective:  Pt upset that she is having many issues. Continues to worry she will not be able to get a lung transplant.      PAST HISTORY  --------------------------------------------------------------------------------  No significant changes to PMH, PSH, FHx, SHx, unless otherwise noted    ALLERGIES & MEDICATIONS  --------------------------------------------------------------------------------  Allergies    No Known Allergies    Intolerances      Standing Inpatient Medications  ALBUTerol/ipratropium for Nebulization 3 milliLiter(s) Nebulizer <User Schedule>  amLODIPine   Tablet 5 milliGRAM(s) Oral daily  artificial tears (preservative free) Ophthalmic Solution 1 Drop(s) Both EYES three times a day  aspirin enteric coated 81 milliGRAM(s) Oral daily  Biotene Dry Mouth Oral Rinse 5 milliLiter(s) Swish and Spit two times a day  buDESOnide   0.5 milliGRAM(s) Respule 0.5 milliGRAM(s) Inhalation every 12 hours  cholecalciferol 2000 Unit(s) Oral daily  dextrose 5%. 1000 milliLiter(s) IV Continuous <Continuous>  dextrose 50% Injectable 12.5 Gram(s) IV Push once  dextrose 50% Injectable 25 Gram(s) IV Push once  dextrose 50% Injectable 25 Gram(s) IV Push once  docusate sodium 100 milliGRAM(s) Oral daily  enoxaparin Injectable 40 milliGRAM(s) SubCutaneous every 24 hours  insulin glargine Injectable (LANTUS) 16 Unit(s) SubCutaneous at bedtime  insulin lispro (HumaLOG) corrective regimen sliding scale   SubCutaneous three times a day before meals  insulin lispro (HumaLOG) corrective regimen sliding scale   SubCutaneous at bedtime  insulin lispro Injectable (HumaLOG) 10 Unit(s) SubCutaneous before lunch  insulin lispro Injectable (HumaLOG) 10 Unit(s) SubCutaneous before breakfast  insulin lispro Injectable (HumaLOG) 12 Unit(s) SubCutaneous with dinner  isosorbide   mononitrate ER Tablet (IMDUR) 30 milliGRAM(s) Oral daily  melatonin 1 milliGRAM(s) Oral at bedtime  montelukast 10 milliGRAM(s) Oral daily  multivitamin 1 Tablet(s) Oral daily  nystatin    Suspension 141985 Unit(s) Oral four times a day  pantoprazole    Tablet 40 milliGRAM(s) Oral before breakfast  polyethylene glycol 3350 17 Gram(s) Oral daily  predniSONE   Tablet 40 milliGRAM(s) Oral daily  senna 2 Tablet(s) Oral at bedtime  theophylline ER Capsule 400 milliGRAM(s) Oral daily    PRN Inpatient Medications  bisacodyl Suppository 10 milliGRAM(s) Rectal daily PRN  dextrose 40% Gel 15 Gram(s) Oral once PRN  glucagon  Injectable 1 milliGRAM(s) IntraMuscular once PRN  ondansetron Injectable 4 milliGRAM(s) IV Push every 8 hours PRN  sodium chloride 0.65% Nasal 1 Spray(s) Both Nostrils two times a day PRN      REVIEW OF SYSTEMS  --------------------------------------------------------------------------------  Gen: + fatigue  Respiratory: + dyspnea  CV: No chest pain  GI: No abdominal pain  MSK: + edema  Neuro: no confusion    VITALS/PHYSICAL EXAM  --------------------------------------------------------------------------------  T(C): 36.5 (12-14-18 @ 14:44), Max: 36.6 (12-14-18 @ 06:36)  HR: 96 (12-14-18 @ 14:44) (87 - 105)  BP: 103/66 (12-14-18 @ 14:44) (102/64 - 115/70)  RR: 20 (12-14-18 @ 14:44) (18 - 20)  SpO2: 96% (12-14-18 @ 14:44) (95% - 98%)  Wt(kg): --        12-13-18 @ 07:01  -  12-14-18 @ 07:00  --------------------------------------------------------  IN: 660 mL / OUT: 1500 mL / NET: -840 mL      Physical Exam:  	Gen: NAD  	HEENT: +NC  	Pulm: CTA B/L  	CV: RRR, S1S2; no rub  	Abd: +BS, soft, nontender/nondistended  	LE: b/l LE edema to thighs  	Neuro: follows commands  	Psych: upset  	Skin: Warm, without rashes    LABS/STUDIES  --------------------------------------------------------------------------------              13.7   15.4  >-----------<  189      [12-14-18 @ 13:58]              40.7     131  |  86  |  57  ----------------------------<  202      [12-14-18 @ 13:58]  4.8   |  35  |  1.44        Ca     8.8     [12-14-18 @ 13:58]      Mg     2.3     [12-14-18 @ 13:58]      Phos  3.7     [12-14-18 @ 13:58]            Creatinine Trend:  SCr 1.44 [12-14 @ 13:58]  SCr 1.72 [12-14 @ 03:28]  SCr 1.56 [12-13 @ 14:00]  SCr 1.82 [12-13 @ 01:53]  SCr 1.43 [12-12 @ 07:02]    Urinalysis - [12-13-18 @ 16:09]      Color Yellow / Appearance Clear / SG 1.022 / pH 5.5      Gluc Negative / Ketone Negative  / Bili Negative / Urobili Negative       Blood Negative / Protein Negative / Leuk Est Negative / Nitrite Negative      RBC  / WBC  / Hyaline  / Gran  / Sq Epi  / Non Sq Epi  / Bacteria     Urine Creatinine 76      [12-13-18 @ 14:06]  Urine Sodium 20      [12-13-18 @ 14:06]  Urine Potassium 50      [12-13-18 @ 14:06]    HbA1c 7.2      [11-30-18 @ 07:58]

## 2018-12-14 NOTE — PROGRESS NOTE ADULT - PROBLEM SELECTOR PLAN 2
possibly from retention. Baseline sCr 1-1.3. UA bland. SCr improved to 1.4 today.  - Bladder sono to r/o retention.  - Obtain renal US.  - Monitor BMP  - Dose meds renally

## 2018-12-14 NOTE — PROGRESS NOTE ADULT - ASSESSMENT
58yoF w/ advanced COPD on home O2 3L (being evaluated at [place] for lung transplant), HTN, HLD, CAD s/p 6 stents, DMII on metformin, PVD, who p/w progressive worsening dyspnea x 3 weeks c/w COPD exacerbation. Renal now called for persistent hyperkalemia.

## 2018-12-14 NOTE — PROGRESS NOTE ADULT - ASSESSMENT
57 y/o F w/h/o controlled T2DM on Metformin 500mg bid. Also h/o CAD and COPD. Here with COPD exacerbation on Methylprednisolone until yesterday now on Prednisone once a day. Basal insulin requirements going down due to changes on steroids and also increased creat levels. Agree with lower dose of Lantus at this time. Will continue same meal time insulin since pt was hyperglycemic for lunch. Pt instructed to eat if getting premeal insulin dose and if not eating to make staff aware so only correction insulin is given and prandial insulin is held. Pt and staff aware since this is what happened  yesterday.  No hypoglycemia. BG goal (100-200mg/dl).

## 2018-12-14 NOTE — PROGRESS NOTE ADULT - PROBLEM SELECTOR PLAN 1
-test BG AC/HS  -Decrease Lantus dose to 16 units QHS  -C/w  Humalog 10-10-12 ac meals for now.   -c/w Humalog moderate correction scale AC and Mod HS scale  -Please notify endocrine when steroids further tapered. Will need adjustment to insulin regimen.  -Plan discussed with pt/team.  Contact info: [phone] (24/7). pager [phone]

## 2018-12-14 NOTE — PROGRESS NOTE ADULT - ASSESSMENT
ASSESSMENT:    ongoing dyspnea with minimal exertion with chronic hypoxic and hypercapnic respiratory failure due to very severe COPD with "exacerbation" - adequate oxygenation on a nasal canula in the setting of profound dyspnea suggests that alterations in the mechanics of breathing due to lung hyperinflation are playing a significant role in shortness of breath - there is evidence of CAD without pulmonary hypertension on the CT scan which is without pulmonary emboli or pneumonia - ECHO has a preserved LVEF, no significant valvular heart disease and no pulmonary hypertension - now with AURORA, hyponatremia, hyperkalemia and worsening respiratory acidosis requiring BIPAP support - lower extremity weakness and pain 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 perhaps exacerbated by statin use    HTN/HLD/DM    CAD s/p PCI    PAD    extremity swelling likely related to steroid induced fluid retention and nocturnal hypoxemia - no evidence of DVT on lower extremity Duplex examination    PLAN/RECOMMENDATIONS:    BIPAP 12/5 with 40% FiO2 on and off during the day and during sleep until pH ~ 7.4 then continue just during sleep  oxygen supplementation to keep saturation greater than 92% using a nasal canula when off BIPAP  following ABG  sputum culture - no growth - has completed a course of biaxin  home trilogy vent has been arranged with [facility]   albuterol/atrovent nebs q4h holding 2AM dose  pulmicort 0.5mg nebs q12h  singulair/theophylline - check theophylline level  prednisone 40mg daily - glucose control steroids - nystatin  cardiac meds: ASA/imdur/norvasc - discontinue crestor with possible myopathy  diurese as tolerated by renal function and hemodynamics - watch for worsening metabolic alkalosis with loop diuretic use which could lead to worsening hypercapnia  cardiology evaluation noted - ECHO with preserved LVEF, no evidence of valvular heart disease and no evidence of right ventricular dysfunction despite chronic hypoxemia  DVT prophylaxis - SQ lovenox  physical therapy as able  renal and endocrine evaluation noted  bowel regimen  outpatient pulmonary rehabilitation  outpatient evaluation for lung transplantation    Will follow with you. Plan of care discussed with the patient and her brothers at bedside and the medical team. Consult and progress notes have been sent to [facility] lung transplant team.    David Fair MD, [facility] - [phone]  Pulmonary Medicine

## 2018-12-14 NOTE — PROVIDER CONTACT NOTE (OTHER) - SITUATION
Pt admit with pulmonary emphysema; pt with FS 38, repeat 42; 105 after treatment; pt ordered for Lantus 16units

## 2018-12-14 NOTE — PROGRESS NOTE ADULT - SUBJECTIVE AND OBJECTIVE BOX
Patient is a 58y old  Female who presents with a chief complaint of dyspnea (13 Dec 2018 20:31)      SUBJECTIVE / OVERNIGHT EVENTS: breathing is the same, no cp, still with swelling in extremities, feels tired, no bm yesterday     MEDICATIONS  (STANDING):  ALBUTerol/ipratropium for Nebulization 3 milliLiter(s) Nebulizer <User Schedule>  amLODIPine   Tablet 5 milliGRAM(s) Oral daily  artificial tears (preservative free) Ophthalmic Solution 1 Drop(s) Both EYES three times a day  aspirin enteric coated 81 milliGRAM(s) Oral daily  Biotene Dry Mouth Oral Rinse 5 milliLiter(s) Swish and Spit two times a day  buDESOnide   0.5 milliGRAM(s) Respule 0.5 milliGRAM(s) Inhalation every 12 hours  cholecalciferol 2000 Unit(s) Oral daily  dextrose 5%. 1000 milliLiter(s) (50 mL/Hr) IV Continuous <Continuous>  dextrose 50% Injectable 12.5 Gram(s) IV Push once  dextrose 50% Injectable 25 Gram(s) IV Push once  dextrose 50% Injectable 25 Gram(s) IV Push once  docusate sodium 100 milliGRAM(s) Oral daily  enoxaparin Injectable 40 milliGRAM(s) SubCutaneous every 24 hours  insulin glargine Injectable (LANTUS) 20 Unit(s) SubCutaneous at bedtime  insulin lispro (HumaLOG) corrective regimen sliding scale   SubCutaneous three times a day before meals  insulin lispro (HumaLOG) corrective regimen sliding scale   SubCutaneous at bedtime  insulin lispro Injectable (HumaLOG) 10 Unit(s) SubCutaneous before lunch  insulin lispro Injectable (HumaLOG) 10 Unit(s) SubCutaneous before breakfast  insulin lispro Injectable (HumaLOG) 12 Unit(s) SubCutaneous with dinner  isosorbide   mononitrate ER Tablet (IMDUR) 30 milliGRAM(s) Oral daily  melatonin 3 milliGRAM(s) Oral at bedtime  montelukast 10 milliGRAM(s) Oral daily  multivitamin 1 Tablet(s) Oral daily  nystatin    Suspension 756101 Unit(s) Oral four times a day  pantoprazole    Tablet 40 milliGRAM(s) Oral before breakfast  polyethylene glycol 3350 17 Gram(s) Oral daily  predniSONE   Tablet 40 milliGRAM(s) Oral daily  senna 2 Tablet(s) Oral at bedtime  theophylline ER Capsule 400 milliGRAM(s) Oral daily    MEDICATIONS  (PRN):  bisacodyl Suppository 10 milliGRAM(s) Rectal daily PRN Constipation  dextrose 40% Gel 15 Gram(s) Oral once PRN Blood Glucose LESS THAN 70 milliGRAM(s)/deciliter  glucagon  Injectable 1 milliGRAM(s) IntraMuscular once PRN Glucose LESS THAN 70 milligrams/deciliter  ondansetron Injectable 4 milliGRAM(s) IV Push every 8 hours PRN Nausea and/or Vomiting  sodium chloride 0.65% Nasal 1 Spray(s) Both Nostrils two times a day PRN Nasal Congestion        CAPILLARY BLOOD GLUCOSE      POCT Blood Glucose.: 203 mg/dL (14 Dec 2018 12:13)  POCT Blood Glucose.: 157 mg/dL (14 Dec 2018 08:39)  POCT Blood Glucose.: 122 mg/dL (14 Dec 2018 03:10)  POCT Blood Glucose.: 144 mg/dL (13 Dec 2018 22:53)  POCT Blood Glucose.: 83 mg/dL (13 Dec 2018 20:58)    I&O's Summary    13 Dec 2018 07:01  -  14 Dec 2018 07:00  --------------------------------------------------------  IN: 660 mL / OUT: 1500 mL / NET: -840 mL        PHYSICAL EXAM:  GENERAL: NAD, well-developed  HEAD:  Atraumatic, Normocephalic  EYES: conjunctiva and sclera clear  NECK: No JVD  CHEST/LUNG: decreased breath sounds b/l   HEART: Regular rate and rhythm; S1S2  ABDOMEN: Soft, Nontender, Nondistended; Bowel sounds present  EXTREMITIES:  +1 UE edema b/l, +1 LE edema   PSYCH: AAOx3  SKIN: No rashes or lesions    LABS:                        14.2   16.5  )-----------( 183      ( 13 Dec 2018 14:00 )             42.3     12-14    132<L>  |  88<L>  |  63<H>  ----------------------------<  123<H>  5.4<H>   |  35<H>  |  1.72<H>    Ca    9.0      14 Dec 2018 03:28  Phos  4.1     12-13  Mg     2.3     12-13            Urinalysis Basic - ( 13 Dec 2018 16:09 )    Color: Yellow / Appearance: Clear / S.022 / pH: x  Gluc: x / Ketone: Negative  / Bili: Negative / Urobili: Negative mg/dL   Blood: x / Protein: Negative mg/dL / Nitrite: Negative   Leuk Esterase: Negative / RBC: x / WBC x   Sq Epi: x / Non Sq Epi: x / Bacteria: x        RADIOLOGY & ADDITIONAL TESTS:    Imaging Personally Reviewed:    Consultant(s) Notes Reviewed:      Care Discussed with Consultants/Other Providers: renal, pulm

## 2018-12-14 NOTE — PROGRESS NOTE ADULT - PROBLEM SELECTOR PLAN 1
in setting of ARB use and hyperglycemia, most likely due to low elida/renin state; now in setting of AURORA and acidosis. Was taken off Benicar, and switched to Norvasc and K improved. Has been on steroids- less likely any adrenal insufficiency. Pt may be retaining in setting of morphine use.   - Still pending renal and bladder sonogram to r/o retention.   - C/w Bipap to help improving CO2 retention resulting in respiratory acidosis.  - ABG w/ pH of 7.4. Serum bicarb up to 35.    Can give diamox 250mg IV x1 today as serum bicarb rising and hold lasix.     Check bicarb tomorrow- if downtrending, can give lasix again.  - Continue to control blood glucose.   - Low K diet.  - Monitor K daily.  - Avoid ACEi/ARB therapy.

## 2018-12-14 NOTE — PROGRESS NOTE ADULT - SUBJECTIVE AND OBJECTIVE BOX
Diabetes Follow up note: Saw pt earlier today  Interval Hx: 57 y/o F w/h/o controlled T2DM on Metformin 500mg bid. Also h/o CAD and COPD. Here with COPD exacerbation> now on Prednisone 40mg daily. Noted pt not eating yesterday due to poor appetite and also BIPAP on and off. Noted pt received Lantus 16 units last night instead of 20 with FBG of 157. Pt states she ate breakfast today >noted  ac lunch. No hypoglycemia. Creat going up. Off antibiotic      Review of Systems:  General: as above plus feeling very tired.  CV: No CP  GI: Tolerating POs without any N/V/D/ABD PAIN. See above  Resp: NIELSON on and off. Pt states respiratory status is not improving. Can't even go to the bathroom due to NIELSON and unable to  her legs without assistance.   ENDO: No S&Sx of hypoglycemia      MEDS:  insulin glargine Injectable (LANTUS) 20 Unit(s) SubCutaneous at bedtime  insulin lispro (HumaLOG) corrective regimen sliding scale   SubCutaneous three times a day before meals  insulin lispro (HumaLOG) corrective regimen sliding scale   SubCutaneous at bedtime  insulin lispro Injectable (HumaLOG) 12 Unit(s) SubCutaneous before dinner  insulin lispro Injectable (HumaLOG) 10 Unit(s) SubCutaneous before breakfast  insulin lispro Injectable (HumaLOG) 10 Unit(s) SubCutaneous before lunch  rosuvastatin 10 milliGRAM(s) Oral at bedtime  theophylline ER Capsule 400 milliGRAM(s) Oral daily  cholecalciferol 2000 Unit(s) Oral daily  predniSONE   Tablet 40 milliGRAM(s) Oral daily      Allergies    No Known Allergies      PE:  General: Female lying in bed. On O2. Primary team rounding at time of visit   Vital Signs Last 24 Hrs  T(C): 36.6 (12-14-18 @ 06:36), Max: 36.6 (12-14-18 @ 06:36)  T(F): 97.8 (12-14-18 @ 06:36), Max: 97.8 (12-14-18 @ 06:36)  HR: 87 (12-14-18 @ 08:38) (87 - 105)  BP: 102/64 (12-14-18 @ 06:36) (102/64 - 115/70)  BP(mean): --  RR: 20 (12-14-18 @ 08:38) (18 - 20)  SpO2: 96% (12-14-18 @ 08:38) (95% - 98%)  Abd: Soft, NT, ND, Obese.   Extremities: Warm. B/L trace edema stable.   Neuro: A&O X3    LABS:  POCT Blood Glucose.: 203 mg/dL (12-14-18 @ 12:13)  POCT Blood Glucose.: 157 mg/dL (12-14-18 @ 08:39)  POCT Blood Glucose.: 122 mg/dL (12-14-18 @ 03:10)  POCT Blood Glucose.: 144 mg/dL (12-13-18 @ 22:53)  POCT Blood Glucose.: 83 mg/dL (12-13-18 @ 20:58)  POCT Blood Glucose.: 88 mg/dL (12-13-18 @ 13:17)  POCT Blood Glucose.: 110 mg/dL (12-13-18 @ 08:13)  POCT Blood Glucose.: 109 mg/dL (12-12-18 @ 22:53)  POCT Blood Glucose.: 228 mg/dL (12-12-18 @ 18:17)                          13.7   15.4  )-----------( 189      ( 14 Dec 2018 13:58 )             40.7     12-14    132<L>  |  88<L>  |  63<H>  ----------------------------<  123<H>  5.4<H>   |  35<H>  |  1.72<H>    Ca    9.0      14 Dec 2018 03:28  Phos  4.1     12-13  Mg     2.3     12-13      12-12    127<L>  |  89<L>  |  57<H>  ----------------------------<  104<H>  6.0<H>   |  22  |  1.43<H>    Ca    8.8      12 Dec 2018 07:02  Phos  3.8     12-11  Mg     2.4     12-11      Hemoglobin A1C, Whole Blood: 7.2 % <H> [4.0 - 5.6] (11-30-18 @ 07:58)

## 2018-12-14 NOTE — PROGRESS NOTE ADULT - SUBJECTIVE AND OBJECTIVE BOX
[facility] - [facility]     PROGRESS NOTE    CHIEF COMPLAINT: chronic hypoxic respiratory failure; acute on chronic hypercapnic respiratory failure; COPD exacerbation; emphysema; dyspnea    INTERVAL HISTORY: hyponatremia; hyperkalemia; worsening renal function; on and off BIPAP due to worsening respiratory acidosis; very frustrated about prolonged hospitalization and lack of clinical improvement; no shortness of breath at rest but severely dyspneic with minimal exertion; no cough, sputum production, chest congestion or wheeze; no fevers, chills or sweats; no chest pain/pressure or palpitations; improved glucose control; persistent extremity swelling upper > lower; sputum cultures without growth x 2; lower extremity weakness and pain -> statin discontinued - steroids decreased    REVIEW OF SYSTEMS:  Constitutional: As per interval history  HEENT: Within normal limits  CV: As per interval history  Resp: As per interval history  GI: Within normal limits   : [place]  Musculoskeletal: lower extremity weakness and pain  Skin: Within normal limits  Neurological: Within normal limits  Psychiatric: Within normal limits  Endocrine: Within normal limits  Hematologic/Lymphatic: Within normal limits  Allergic/Immunologic: Within normal limits    MEDICATIONS:     Pulmonary "  ALBUTerol/ipratropium for Nebulization 3 milliLiter(s) Nebulizer <User Schedule>  buDESOnide   0.5 milliGRAM(s) Respule 0.5 milliGRAM(s) Inhalation every 12 hours  montelukast 10 milliGRAM(s) Oral daily  theophylline ER Capsule 400 milliGRAM(s) Oral daily      Anti-microbials:  nystatin    Suspension 934197 Unit(s) Oral four times a day      Cardiovascular:  amLODIPine   Tablet 5 milliGRAM(s) Oral daily  isosorbide   mononitrate ER Tablet (IMDUR) 30 milliGRAM(s) Oral daily      Other:  artificial tears (preservative free) Ophthalmic Solution 1 Drop(s) Both EYES three times a day  aspirin enteric coated 81 milliGRAM(s) Oral daily  Biotene Dry Mouth Oral Rinse 5 milliLiter(s) Swish and Spit two times a day  bisacodyl Suppository 10 milliGRAM(s) Rectal daily PRN  cholecalciferol 2000 Unit(s) Oral daily  dextrose 40% Gel 15 Gram(s) Oral once PRN  dextrose 5%. 1000 milliLiter(s) IV Continuous <Continuous>  dextrose 50% Injectable 12.5 Gram(s) IV Push once  dextrose 50% Injectable 25 Gram(s) IV Push once  dextrose 50% Injectable 25 Gram(s) IV Push once  docusate sodium 100 milliGRAM(s) Oral daily  enoxaparin Injectable 40 milliGRAM(s) SubCutaneous every 24 hours  glucagon  Injectable 1 milliGRAM(s) IntraMuscular once PRN  insulin glargine Injectable (LANTUS) 20 Unit(s) SubCutaneous at bedtime  insulin lispro (HumaLOG) corrective regimen sliding scale   SubCutaneous three times a day before meals  insulin lispro (HumaLOG) corrective regimen sliding scale   SubCutaneous at bedtime  insulin lispro Injectable (HumaLOG) 10 Unit(s) SubCutaneous before lunch  insulin lispro Injectable (HumaLOG) 10 Unit(s) SubCutaneous before breakfast  insulin lispro Injectable (HumaLOG) 12 Unit(s) SubCutaneous with dinner  melatonin 3 milliGRAM(s) Oral at bedtime  multivitamin 1 Tablet(s) Oral daily  ondansetron Injectable 4 milliGRAM(s) IV Push every 8 hours PRN  pantoprazole    Tablet 40 milliGRAM(s) Oral before breakfast  polyethylene glycol 3350 17 Gram(s) Oral daily  predniSONE   Tablet 40 milliGRAM(s) Oral daily  senna 2 Tablet(s) Oral at bedtime  sodium chloride 0.65% Nasal 1 Spray(s) Both Nostrils two times a day PRN        OBJECTIVE:    I&O's Detail    13 Dec 2018 07:01  -  14 Dec 2018 07:00  --------------------------------------------------------  IN:    Oral Fluid: 410 mL    sodium chloride 0.9%: 250 mL  Total IN: 660 mL    OUT:    Voided: 1500 mL  Total OUT: 1500 mL    Total NET: -840 mL      POCT Blood Glucose.: 203 mg/dL (14 Dec 2018 12:13)  POCT Blood Glucose.: 157 mg/dL (14 Dec 2018 08:39)  POCT Blood Glucose.: 122 mg/dL (14 Dec 2018 03:10)  POCT Blood Glucose.: 144 mg/dL (13 Dec 2018 22:53)  POCT Blood Glucose.: 83 mg/dL (13 Dec 2018 20:58)  POCT Blood Glucose.: 88 mg/dL (13 Dec 2018 13:17)      PHYSICAL EXAM:       ICU Vital Signs Last 24 Hrs  T(C): 36.6 (14 Dec 2018 06:36), Max: 36.6 (14 Dec 2018 06:36)  T(F): 97.8 (14 Dec 2018 06:36), Max: 97.8 (14 Dec 2018 06:36)  HR: 87 (14 Dec 2018 08:38) (87 - 105)  BP: 102/64 (14 Dec 2018 06:36) (102/64 - 115/70)  BP(mean): --  ABP: --  ABP(mean): --  RR: 20 (14 Dec 2018 08:38) (18 - 20)  SpO2: 96% (14 Dec 2018 08:38) (95% - 98%) on 5lpm nasal canula -> 40% FiO2 BIPAP     General: Awake. Alert. Cooperative. Mildly tachypneic. Appears stated age. Obese.   HEENT:  Atraumatic. Normocephalic. Anicteric. Normal oral mucosa. PERRL. EOMI.   Neck: Supple. Trachea midline. Thyroid without enlargement/tenderness/nodules. No carotid bruit. No JVD.	  Cardiovascular: Regular rate and rhythm. Distant S1 S2. No murmurs, rubs or gallops.  Respiratory: Respirations unlabored. Markedly decreased breath sounds throughout. Prolonged expiratory phase of respiration. Scant wheeze. No curvature.  Abdomen: Soft. Non-tender. Non-distended. No organomegaly. No masses. Normal bowel sounds. Obese.  Extremities: Warm to touch. No clubbing or cyanosis. Mild bilateral lower extremity edema up to the thigh. Moderate bilateral upper extremity swelling right > left  Pulses: Decreased lower extremity peripheral pulses.  Skin: No rashes or lesions. No ecchymoses. No cyanosis. Warm to touch.  Lymph Nodes: Cervical, supraclavicular and axillary nodes normal  Neurological: Motor and sensory examination equal and normal. A and O x 3  Psychiatry: Appropriate mood and affect.     LABS:                        14.2   16.5  )-----------( 183      ( 13 Dec 2018 14:00 )             42.3     12-14    132<L>  |  88<L>  |  63<H>  ----------------------------<  123<H>  5.4<H>   |  35<H>  |  1.72<H>    12-13    130<L>  |  89<L>  |  60<H>  ----------------------------<  80  6.1<H>   |  30  |  1.56<H>    Ca      9.0      12-14    Ca      9.7      12-13    Phos    4.1     12-13    Phos    3.8     12-      Mg       2.3     12-13    Mg       2.4     12-11    ABG - ( 13 Dec 2018 06:29 )  pH: 7.30  /  pCO2: 71    /  pO2: 182   / HCO3: 34    / Base Excess: 5.8   /  SaO2: 99        ABG - ( 13 Dec 2018 00:59 )  pH: 7.32  /  pCO2: 71    /  pO2: 75    / HCO3: 35    / Base Excess: 7.1   /  SaO2: 95        ABG - ( 11 Dec 2018 11:45 )  pH: 7.39  /  pCO2: 54    /  pO2: 98    / HCO3: 32    / Base Excess: 6.2   /  SaO2: 98        ABG - ( 09 Dec 2018 11:26 )  pH: 7.35  /  pCO2: 63    /  pO2: 145   / HCO3: 34    / Base Excess: 6.6   /  SaO2: 99      ABG - ( 09 Dec 2018 00:28 )  pH: 7.32  /  pCO2: 71    /  pO2: 124   / HCO3: 36    / Base Excess: 7.6   /  SaO2: 99        ABG - ( 08 Dec 2018 20:50 )  pH: 7.32  /  pCO2: 72    /  pO2: 80    / HCO3: 36    / Base Excess: 7.7   /  SaO2: 95        Serum Pro-Brain Natriuretic Peptide: 254 pg/mL ( @ 14:00)    < from: Transthoracic Echocardiogram (18 @ 08:59) >    Patient name: GUY HARTMAN  YOB: 1960   Age: 58 (F)   MR#: [id number]  Study Date: 2018  Location: [address] [place], [place] [id number]: [facility]  Study quality: Technically good  Referring Physician: Allen ingram MD  BloodPressure: 120/80 mmHg  Height: 163 cm  Weight: 88 kg  BSA: 1.9 m2  ------------------------------------------------------------------------  PROCEDURE: Transthoracic echocardiogram with 2-D, M-Mode  and complete spectral and color flow Doppler.  INDICATION: Other forms of dyspnea (R06.09)  ------------------------------------------------------------------------  Dimensions:    Normal Values:  LA:     3.6    2.0 - 4.0 cm  Ao:     2.9    2.0 - 3.8 cm  SEPTUM: 0.9    0.6 - 1.2 cm  PWT:    0.8    0.6- 1.1 cm  LVIDd:  4.9    3.0 - 5.6 cm  LVIDs:  2.9    1.8 - 4.0 cm  Derived variables:  LVMI: 73 g/m2  RWT: 0.32  Fractional short: 40 %  EF (Teicholtz): 71 %  Doppler Peak Velocity (m/sec): AoV=1.2  ------------------------------------------------------------------------  Observations:  Mitral Valve: Normal mitral valve.  Aortic Valve/Aorta: Normal trileaflet aortic valve. Peak  transaortic valve gradient equals 6 mm Hg, mean transaortic  valve gradient equals 3 mm Hg, aortic valve velocity time  integral equals 22 cm. Trace aortic regurgitation.  Aortic Root: 2.9 cm.  Left Atrium: Normal left atrium.  LA volume index = 18  cc/m2.  Left Ventricle: Normal left ventricular systolic function.  No segmental wall motion abnormalities. Normal left  ventricular internal dimensions and wall thicknesses. Mild  diastolic dysfunction (Stage I).  Right Heart: Normal right atrium. Normal right ventricular  size and function. Normal tricuspid valve. Minimal  tricuspid regurgitation. Normal pulmonic valve.  Pericardium/Pleura: Normal pericardium with no pericardial  effusion.  Hemodynamic: Estimated right atrial pressure is 8 mm Hg.  Inadequate tricuspid regurgitation Doppler envelope  precludes estimation of RVSP.  ------------------------------------------------------------------------  Conclusions:  1. Normal mitral valve.  2. Normal trileaflet aortic valve. Peak transaortic valve  gradient equals 6 mm Hg, mean transaortic valve gradient  equals 3 mm Hg, aortic valve velocity time integral equals  22 cm. Trace aortic regurgitation.  3. Aortic Root: 2.9 cm.  4. Normal left atrium.  LA volume index = 18 cc/m2.  5. Normal left ventricular internal dimensions and wall  thicknesses.  6. Normal left ventricular systolic function. No segmental  wall motion abnormalities.  7. Mild diastolic dysfunction (Stage I).  8. Normal right ventricular size and function.  9. Inadequate tricuspid regurgitation Doppler envelope  precludes estimation of RVSP.  10. Normal tricuspid valve. Minimal tricuspid  regurgitation.  *** Compared with echocardiogram report of 2014, no  significant changes noted.  ------------------------------------------------------------------------  Confirmed on  2018 - 13:49:43 by Shefali Gómez M.D.  ------------------------------------------------------------------------    < end of copied text >  ---------------------------------------------------------------------------------------------------------------    MICROBIOLOGY:   Urinalysis Basic - ( 13 Dec 2018 16:09 )    Color: Yellow / Appearance: Clear / S.022 / pH: x  Gluc: x / Ketone: Negative  / Bili: Negative / Urobili: Negative mg/dL   Blood: x / Protein: Negative mg/dL / Nitrite: Negative   Leuk Esterase: Negative / RBC: x / WBC x   Sq Epi: x / Non Sq Epi: x / Bacteria: x    Culture - Sputum . (18 @ 08:34)    Gram Stain:   Rare polymorphonuclear leukocytes per low power field  Rare Squamous epithelial cells per low power field  Numerous Gram Positive Cocci in Pairs and Chains per oil power field    Specimen Source: .Sputum Sputum    Culture Results:   Normal Respiratory Samaria present    Culture - Sputum . (18 @ 22:50)    Gram Stain:   Moderate polymorphonuclear leukocytes per low power field  Few Squamous epithelial cells per low power field  Moderate Gram Positive Cocci in Pairs and Chains per oil power field    Specimen Source: .Sputum Sputum    Culture Results:   Normal Respiratory Samaria present    Rapid Respiratory Viral Panel (18 @ 01:30)    Rapid RVP Result: NotDetec: The FilmArray RVP Rapid uses polymerase chain reaction (PCR) and melt  curve analysis to screen for adenovirus; coronavirus HKU1, NL63, 229E,  OC43; human metapneumovirus (hMPV); human enterovirus/rhinovirus  (Entero/RV); influenza A; influenza A/H1;influenza A/H3; influenza  A/H1-2009; influenza B; parainfluenza viruses 1, 2, 3, 4; respiratory  syncytial virus; Bordetella pertussis; Mycoplasma pneumoniae; and  Chlamydophila pneumoniae.    RADIOLOGY:  [x] Chest radiographs reviewed and interpreted by me    < from: Xray Chest 1 View- PORTABLE-Routine (18 @ 13:10) >    EXAM:  XR CHEST PORTABLE ROUTINE 1V                          PROCEDURE DATE:  2018      INTERPRETATION:  HISTORY: Shortness of breath    TECHNIQUE: Portable frontal chest x-ray    COMPARISON: Chest x-ray from 2018    FINDINGS:    No focal consolidation.  There is no pneumothorax. There are no pleural effusions.   The cardiomediastinal silhouette cannot be adequately assessed on this   projection.    IMPRESSION:   Clear lungs.       JEANIE SPEARS M.D., RADIOLOGY RESIDENT  This document has been electronically signed.  JEYSON SANCHEZ M.D., ATTENDING RADIOLOGIST  This document has been electronically signed. Dec 13 2018  3:28PM      < end of copied text >  ---------------------------------------------------------------------------------------------------------------  < from: CT Angio Chest w/ IV Cont (18 @ 16:30) >    EXAM:  CT ANGIO CHEST (W)AW IC                          PROCEDURE DATE:  2018      INTERPRETATION:  CT CHEST WITH CONTRAST    INDICATION: Known COPD not responding to steroids and bronchodilators.   Progressively worsening dyspnea. Evaluate for pulmonary embolism.    TECHNIQUE: Enhanced helical images were obtained of the chest. Coronal   and sagittal images were reconstructed. Maximum intensity projection   images were generated. Images were obtained after the uneventful   administration of 60 cc nonionic intravenous Omnipaque 350.  40 cc of   Omnipaque 350 was discarded.    COMPARISON: CT chest 2014.    FINDINGS:     Lungs And Airways: Emphysematous changes of the lung.      The airways are unremarkable.      Pleura: No pneumothorax. No pleural effusion.    Mediastinum: There are no enlarged chest lymph nodes. The visualized   portion of the thyroid gland is unremarkable.       Heart and Vasculature: No pulmonary embolism.    The main pulmonary artery is normal in caliber. No thoracic aortic   aneurysm or dissection. Atheromatous disease of the aorta.    The heart is normal in size.  There is no pericardial effusion.   Coronary artery disease with calcified plaque involving the right and   left main coronary arteries and the left anterior descending artery.      Upper Abdomen: The upper abdomen is unremarkable.    Bones And Soft Tissues: Degenerative changes of the spine.  The soft   tissues are unremarkable.      IMPRESSION:   1.  No pulmonary embolism.  2. Emphysematous changes of the lung.    DIANA MEDINA M.D., RADIOLOGY RESIDENT  This document has been electronically signed.  JEYSON SANCHEZ M.D., ATTENDING RADIOLOGIST  This document has been electronically signed. Dec  4 2018  5:21PM      < end of copied text >  ---------------------------------------------------------------------------------------------------------------  < from: Xray Chest 1 View AP/PA (18 @ 14:53) >    EXAM:  XR CHEST AP OR PA 1V                          PROCEDURE DATE:  2018      INTERPRETATION:  A single chest x-ray was obtained on 2018.    Indication: Shortness of breath.    Impression:    The heart is normal in size. The lungs are clear. The visualized bones   are normal.    ALEX CUELLAR M.D., ATTENDING RADIOLOGIST  This document has been electronically signed. 2018  2:59PM    < end of copied text >  ---------------------------------------------------------------------------------------------------------------  < from: [facility] Duplex Lower Ext Vein Scan, Bilat (18 @ 15:40) >    EXAM:  DUPLEX SCAN EXT VEINS LOWER BI                          PROCEDURE DATE:  2018      INTERPRETATION:  CLINICAL INFORMATION: Shortness of breath, leg pain and   swelling.    COMPARISON: Bilateral lower extremity venous duplex study dated 2009.    TECHNIQUE: Duplex sonography of the BILATERAL LOWER extremities with   color and spectral Doppler, with and without compression.      FINDINGS:    There is normal compressibility of the bilateral common femoral, femoral   and popliteal veins. No calf vein thrombosis is detected.    Doppler examination shows normal spontaneous and phasic flow.    IMPRESSION:     No evidence of bilateral lower extremity deep venous thrombosis.    JUSTIN ZAVALETA M.D., ATTENDING RADIOLOGIST  This document has been electronically signed. Dec  6 2018  4:03PM    < end of copied text >  ---------------------------------------------------------------------------------------------------------------    < from: VA Duplex Upper Ext Vein Scan, Darius (.18 @ 17:36) >    EXAM:  DUPLEX SCAN EXT VEINS UPPER BI                          PROCEDURE DATE:  2018      IMPRESSION:     No evidence of BILATERAL upper extremity deep venous thrombosis.    SNEHA KELSEY M.D., RADIOLOGY RESIDENT  This document has been electronically signed.  RAYMUNDO DUMONT M.D., ATTENDING RADIOLOGIST  This document has been electronically signed. Dec 13 2018  9:22AM      < end of copied text >  ---------------------------------------------------------------------------------------------------------------  SPIROMETRY:     FEV1 0.56 liters - 22% predicted  FVC 1.73 liters - 52% predicted  FEV1% 32    c/w very severe obstructive lung disease

## 2018-12-14 NOTE — PROGRESS NOTE ADULT - PROBLEM SELECTOR PLAN 5
Thought to be due to secondary to Benicar and hyperglycemia.  Benicar has been discontinued.  Continue Norvasc 5 mg daily.  Glycemic control as above.  Pt with metabolic alk 2/2 chronic resp acidosis   d/w renal appreciate recs, will diurese with lasix 40mg IV x1 today

## 2018-12-14 NOTE — PROGRESS NOTE ADULT - PROBLEM SELECTOR PLAN 3
A1C 7.2, but with hyperglycemia in setting of Solumedrol use.  Increased Lantus to 20 units and changed humalog to 10-10-12.  Continue medium dose SSI.  Continue to monitor blood sugars.  Endocrine recs appreciated.

## 2018-12-14 NOTE — PROGRESS NOTE ADULT - PROBLEM SELECTOR PLAN 1
Prednisone 40mg qd   CTA showed no PE, no PNA.  Continue Pulmicort, Duoneb ATC (with transition back to spiriva upon discharge), Theophylline, and Singulair.  Completed 7 days of biaxin   Echo report noted, mild diastolic dysfunction, no significant changes from prior study in 2014.  Home Trilogy vent arranged by pulmonary.  minimal improvement clinically  c/w bipap   d/w pulmonary today check abg, will give lasix 40mg IV x1, pending labs, reassess further need for lasix in am  Pulm follow up with [place]/transplant list

## 2018-12-15 DIAGNOSIS — E11.649 TYPE 2 DIABETES MELLITUS WITH HYPOGLYCEMIA WITHOUT COMA: ICD-10-CM

## 2018-12-15 LAB
ANION GAP SERPL CALC-SCNC: 8 MMOL/L — SIGNIFICANT CHANGE UP (ref 5–17)
BUN SERPL-MCNC: 52 MG/DL — HIGH (ref 7–23)
CALCIUM SERPL-MCNC: 8.5 MG/DL — SIGNIFICANT CHANGE UP (ref 8.4–10.5)
CHLORIDE SERPL-SCNC: 84 MMOL/L — LOW (ref 96–108)
CO2 SERPL-SCNC: 38 MMOL/L — HIGH (ref 22–31)
CREAT SERPL-MCNC: 1.27 MG/DL — SIGNIFICANT CHANGE UP (ref 0.5–1.3)
GAS PNL BLDA: SIGNIFICANT CHANGE UP
GLUCOSE BLDC GLUCOMTR-MCNC: 115 MG/DL — HIGH (ref 70–99)
GLUCOSE BLDC GLUCOMTR-MCNC: 120 MG/DL — HIGH (ref 70–99)
GLUCOSE BLDC GLUCOMTR-MCNC: 136 MG/DL — HIGH (ref 70–99)
GLUCOSE BLDC GLUCOMTR-MCNC: 188 MG/DL — HIGH (ref 70–99)
GLUCOSE BLDC GLUCOMTR-MCNC: 214 MG/DL — HIGH (ref 70–99)
GLUCOSE BLDC GLUCOMTR-MCNC: 242 MG/DL — HIGH (ref 70–99)
GLUCOSE SERPL-MCNC: 205 MG/DL — HIGH (ref 70–99)
HCT VFR BLD CALC: 38.4 % — SIGNIFICANT CHANGE UP (ref 34.5–45)
HGB BLD-MCNC: 13 G/DL — SIGNIFICANT CHANGE UP (ref 11.5–15.5)
MAGNESIUM SERPL-MCNC: 2.2 MG/DL — SIGNIFICANT CHANGE UP (ref 1.6–2.6)
MCHC RBC-ENTMCNC: 29.4 PG — SIGNIFICANT CHANGE UP (ref 27–34)
MCHC RBC-ENTMCNC: 33.9 GM/DL — SIGNIFICANT CHANGE UP (ref 32–36)
MCV RBC AUTO: 86.6 FL — SIGNIFICANT CHANGE UP (ref 80–100)
PHOSPHATE SERPL-MCNC: 2.8 MG/DL — SIGNIFICANT CHANGE UP (ref 2.5–4.5)
PLATELET # BLD AUTO: 150 K/UL — SIGNIFICANT CHANGE UP (ref 150–400)
POTASSIUM SERPL-MCNC: 3.8 MMOL/L — SIGNIFICANT CHANGE UP (ref 3.5–5.3)
POTASSIUM SERPL-SCNC: 3.8 MMOL/L — SIGNIFICANT CHANGE UP (ref 3.5–5.3)
RBC # BLD: 4.43 M/UL — SIGNIFICANT CHANGE UP (ref 3.8–5.2)
RBC # FLD: 13.5 % — SIGNIFICANT CHANGE UP (ref 10.3–14.5)
SODIUM SERPL-SCNC: 130 MMOL/L — LOW (ref 135–145)
WBC # BLD: 13.1 K/UL — HIGH (ref 3.8–10.5)
WBC # FLD AUTO: 13.1 K/UL — HIGH (ref 3.8–10.5)

## 2018-12-15 PROCEDURE — 99233 SBSQ HOSP IP/OBS HIGH 50: CPT

## 2018-12-15 PROCEDURE — 99232 SBSQ HOSP IP/OBS MODERATE 35: CPT

## 2018-12-15 RX ORDER — INSULIN LISPRO 100/ML
8 VIAL (ML) SUBCUTANEOUS
Qty: 0 | Refills: 0 | Status: DISCONTINUED | OUTPATIENT
Start: 2018-12-15 | End: 2018-12-17

## 2018-12-15 RX ORDER — INSULIN GLARGINE 100 [IU]/ML
16 INJECTION, SOLUTION SUBCUTANEOUS AT BEDTIME
Qty: 0 | Refills: 0 | Status: DISCONTINUED | OUTPATIENT
Start: 2018-12-15 | End: 2018-12-16

## 2018-12-15 RX ORDER — INSULIN LISPRO 100/ML
8 VIAL (ML) SUBCUTANEOUS
Qty: 0 | Refills: 0 | Status: DISCONTINUED | OUTPATIENT
Start: 2018-12-16 | End: 2018-12-17

## 2018-12-15 RX ADMIN — INSULIN GLARGINE 16 UNIT(S): 100 INJECTION, SOLUTION SUBCUTANEOUS at 21:53

## 2018-12-15 RX ADMIN — Medication 10 UNIT(S): at 09:08

## 2018-12-15 RX ADMIN — Medication 3 MILLILITER(S): at 17:57

## 2018-12-15 RX ADMIN — Medication 1 TABLET(S): at 13:02

## 2018-12-15 RX ADMIN — Medication 40 MILLIGRAM(S): at 17:56

## 2018-12-15 RX ADMIN — Medication 1 DROP(S): at 21:20

## 2018-12-15 RX ADMIN — ONDANSETRON 4 MILLIGRAM(S): 8 TABLET, FILM COATED ORAL at 09:09

## 2018-12-15 RX ADMIN — Medication 2: at 09:08

## 2018-12-15 RX ADMIN — AMLODIPINE BESYLATE 5 MILLIGRAM(S): 2.5 TABLET ORAL at 05:14

## 2018-12-15 RX ADMIN — Medication 400 MILLIGRAM(S): at 09:07

## 2018-12-15 RX ADMIN — Medication 100 MILLIGRAM(S): at 13:02

## 2018-12-15 RX ADMIN — Medication 3 MILLILITER(S): at 09:07

## 2018-12-15 RX ADMIN — Medication 3 MILLILITER(S): at 05:15

## 2018-12-15 RX ADMIN — MONTELUKAST 10 MILLIGRAM(S): 4 TABLET, CHEWABLE ORAL at 13:02

## 2018-12-15 RX ADMIN — ISOSORBIDE MONONITRATE 30 MILLIGRAM(S): 60 TABLET, EXTENDED RELEASE ORAL at 13:02

## 2018-12-15 RX ADMIN — POLYETHYLENE GLYCOL 3350 17 GRAM(S): 17 POWDER, FOR SOLUTION ORAL at 13:02

## 2018-12-15 RX ADMIN — Medication 0.5 MILLIGRAM(S): at 17:57

## 2018-12-15 RX ADMIN — Medication 3 MILLILITER(S): at 21:20

## 2018-12-15 RX ADMIN — Medication 1 MILLIGRAM(S): at 21:20

## 2018-12-15 RX ADMIN — ENOXAPARIN SODIUM 40 MILLIGRAM(S): 100 INJECTION SUBCUTANEOUS at 21:20

## 2018-12-15 RX ADMIN — Medication 5 MILLILITER(S): at 17:56

## 2018-12-15 RX ADMIN — SENNA PLUS 2 TABLET(S): 8.6 TABLET ORAL at 21:20

## 2018-12-15 RX ADMIN — Medication 1 DROP(S): at 13:02

## 2018-12-15 RX ADMIN — Medication 1 DROP(S): at 05:14

## 2018-12-15 RX ADMIN — Medication 0.5 MILLIGRAM(S): at 05:14

## 2018-12-15 RX ADMIN — Medication 81 MILLIGRAM(S): at 13:02

## 2018-12-15 RX ADMIN — Medication 3 MILLILITER(S): at 13:18

## 2018-12-15 RX ADMIN — PANTOPRAZOLE SODIUM 40 MILLIGRAM(S): 20 TABLET, DELAYED RELEASE ORAL at 05:14

## 2018-12-15 RX ADMIN — Medication 2000 UNIT(S): at 13:02

## 2018-12-15 NOTE — PROGRESS NOTE ADULT - SUBJECTIVE AND OBJECTIVE BOX
Follow-up Pulm Progress Note    The patient was seen and examined. Notes reviewed and discussed with staff/team as applicable      No new respiratory events overnight. On BIPAP. Very angry and frustrated over lack of progress    Denies: Chest pain, increased cough, colored phlegm, hemoptysis, N/V/D, neck stiffness, dysuria      Vital Signs Last 24 Hrs  T(C): 36.6 (15 Dec 2018 05:12), Max: 36.6 (15 Dec 2018 05:12)  T(F): 97.8 (15 Dec 2018 05:12), Max: 97.8 (15 Dec 2018 05:12)  HR: 89 (15 Dec 2018 08:37) (87 - 99)  BP: 135/75 (15 Dec 2018 05:12) (97/62 - 135/75)  BP(mean): --  RR: 20 (15 Dec 2018 05:12) (18 - 22)  SpO2: 96% (15 Dec 2018 08:37) (95% - 100%)    ABG - ( 14 Dec 2018 14:11 )  pH, Arterial: 7.44  pH, Blood: x     /  pCO2: 55    /  pO2: 84    / HCO3: 37    / Base Excess: 11.3  /  SaO2: 98            14 @ 07:01  -  12-15 @ 07:00  --------------------------------------------------------  IN: 1800 mL / OUT: 2150 mL / NET: -350 mL          Medications:  MEDICATIONS  (STANDING):  ALBUTerol/ipratropium for Nebulization 3 milliLiter(s) Nebulizer <User Schedule>  amLODIPine   Tablet 5 milliGRAM(s) Oral daily  artificial tears (preservative free) Ophthalmic Solution 1 Drop(s) Both EYES three times a day  aspirin enteric coated 81 milliGRAM(s) Oral daily  Biotene Dry Mouth Oral Rinse 5 milliLiter(s) Swish and Spit two times a day  buDESOnide   0.5 milliGRAM(s) Respule 0.5 milliGRAM(s) Inhalation every 12 hours  cholecalciferol 2000 Unit(s) Oral daily  dextrose 5%. 1000 milliLiter(s) (50 mL/Hr) IV Continuous <Continuous>  dextrose 50% Injectable 12.5 Gram(s) IV Push once  dextrose 50% Injectable 25 Gram(s) IV Push once  dextrose 50% Injectable 25 Gram(s) IV Push once  docusate sodium 100 milliGRAM(s) Oral daily  enoxaparin Injectable 40 milliGRAM(s) SubCutaneous every 24 hours  insulin glargine Injectable (LANTUS) 16 Unit(s) SubCutaneous at bedtime  insulin lispro (HumaLOG) corrective regimen sliding scale   SubCutaneous three times a day before meals  insulin lispro (HumaLOG) corrective regimen sliding scale   SubCutaneous at bedtime  insulin lispro Injectable (HumaLOG) 10 Unit(s) SubCutaneous before lunch  insulin lispro Injectable (HumaLOG) 10 Unit(s) SubCutaneous before breakfast  insulin lispro Injectable (HumaLOG) 12 Unit(s) SubCutaneous with dinner  isosorbide   mononitrate ER Tablet (IMDUR) 30 milliGRAM(s) Oral daily  melatonin 1 milliGRAM(s) Oral at bedtime  montelukast 10 milliGRAM(s) Oral daily  multivitamin 1 Tablet(s) Oral daily  nystatin    Suspension 546359 Unit(s) Oral four times a day  pantoprazole    Tablet 40 milliGRAM(s) Oral before breakfast  polyethylene glycol 3350 17 Gram(s) Oral daily  predniSONE   Tablet 40 milliGRAM(s) Oral daily  senna 2 Tablet(s) Oral at bedtime  theophylline ER Capsule 400 milliGRAM(s) Oral daily    MEDICATIONS  (PRN):  bisacodyl Suppository 10 milliGRAM(s) Rectal daily PRN Constipation  dextrose 40% Gel 15 Gram(s) Oral once PRN Blood Glucose LESS THAN 70 milliGRAM(s)/deciliter  glucagon  Injectable 1 milliGRAM(s) IntraMuscular once PRN Glucose LESS THAN 70 milligrams/deciliter  ondansetron Injectable 4 milliGRAM(s) IV Push every 8 hours PRN Nausea and/or Vomiting  sodium chloride 0.65% Nasal 1 Spray(s) Both Nostrils two times a day PRN Nasal Congestion      Allergies    No Known Allergies      Physical Examination:    Cushingoid white female, NAD, on BIPAP, full FM  Neck: no JVD, LAD, accessory muscle use  PULM: Poor air movement bilaterally, no wheezes  CVS: Regular rate and rhythm, S1S2, no murmurs, rubs, or gallops  Abdomen: obese, NT  Extremities: +edema  Neuro: non focal      LABS:                        13.7   15.4  )-----------( 189      ( 14 Dec 2018 13:58 )             40.7     12-14    131<L>  |  86<L>  |  57<H>  ----------------------------<  202<H>  4.8   |  35<H>  |  1.44<H>    Ca    8.8      14 Dec 2018 13:58  Phos  3.7       Mg     2.3           CAPILLARY BLOOD GLUCOSE      POCT Blood Glucose.: 136 mg/dL (15 Dec 2018 02:00)      Urinalysis Basic - ( 13 Dec 2018 16:09 )    Color: Yellow / Appearance: Clear / S.022 / pH: x  Gluc: x / Ketone: Negative  / Bili: Negative / Urobili: Negative mg/dL   Blood: x / Protein: Negative mg/dL / Nitrite: Negative   Leuk Esterase: Negative / RBC: x / WBC x   Sq Epi: x / Non Sq Epi: x / Bacteria: x        Serum Pro-Brain Natriuretic Peptide: 254 pg/mL (18 @ 14:00)

## 2018-12-15 NOTE — PROGRESS NOTE ADULT - ASSESSMENT
ASSESSMENT:    Severe COPD/chronic respiratory failure/dyspnea    HTN/HLD/DM    CAD s/p PCI    PAD    extremity swelling likely related to steroid induced fluid retention and nocturnal hypoxemia - no evidence of DVT on lower extremity Duplex examination    PLAN/RECOMMENDATIONS:    BIPAP 12/5 with 40% FiO2 on and off during the day and during sleep- continue oxygen supplementation to keep saturation greater than 92% using a nasal canula when off BIPAP  following ABG  home trilogy vent has been arranged with [facility]   albuterol/atrovent nebs q4h holding 2AM dose  pulmicort 0.5mg nebs q12h  singulair/theophylline - check theophylline level  prednisone 40mg daily - glucose control steroids - nystatin  cardiac meds: ASA/imdur/norvasc - discontinue crestor with possible myopathy  diurese as tolerated by renal function and hemodynamics - watch for worsening metabolic alkalosis with loop diuretic use which could lead to worsening hypercapnia  cardiology evaluation noted - ECHO with preserved LVEF, no evidence of valvular heart disease and no evidence of right ventricular dysfunction despite chronic hypoxemia  DVT prophylaxis - SQ lovenox  physical therapy as able  renal and endocrine evaluation noted  bowel regimen  outpatient pulmonary rehabilitation  outpatient evaluation for lung transplantation    Shaka Sharma MD  Pulmonary Medicine  [phone]

## 2018-12-15 NOTE — CONSULT NOTE ADULT - PROBLEM SELECTOR RECOMMENDATION 9
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 and has been having leg pains: She is appropriately refd to [place] for lung transplant: Not knowing her PFT, it seems she is pretty dynamically hyperinflated giving her SOB : Apaart from physical therapy and pulmonary rehab and slow and prolonged expiration, ROFLUMILAST can be added to her regimen: This may  or may not help but certainly it can be tried in the hospital to see if it makes any effect: Another option is to add Azithromycin as an antiinflammatory in low dosages: Her QTC seem ok to give nut she has CAD and it needs to be clarified with cardiology if this antibiotic can be given chronically safely

## 2018-12-15 NOTE — CONSULT NOTE ADULT - SUBJECTIVE AND OBJECTIVE BOX
Asked for second opinion by Dr MANDEEP Mathew, pts primary pulmonologist:       Patient is a 58y old  Female who presents with a chief complaint of dyspnea (15 Dec 2018 11:41)      HPI:  58 F PMH COPD on home O2 3L, HTN, HLD, CAD s/p x6 stents, T2DM, pHTN, PVD, p/w progressive worsening dyspnea x 2 weeks. Pt says she normally uses 3L NC atc at home. Infrequently leaves her house as of late. 2 wks ago, did leave her house a few times, once to go to pulm rehab, and felt much weaker than usual.  Has had increasing dyspnea and fatigue, worse with minimal exertion. Huffing/puffing for breath, sob with talking, showering, etc. +inc sputum volume production, pt says it is white in color, denies purulence. +inc O2 requirement now up to 4LNC at home during last 2 wks. +inc rescue inhaler use upto 7x/daily with minimal improvement.  Denies cough.  No significant inc in wheezing. +some runny nose but denies myalgias, sick contacts, sore throat; +flu shot this year.  Pt denies cp, denies f/c, denies n/v/d, denies orthopnea or significant increase in LE edema. Pt saw her pcp/pulm Dr. Mathew who started her on 10 d course of biaxin and prednisone 60 mg qd, which she has tapered down to 30 mg. Pt has completed a full 7 days of biaxin. Was told to go to ER last week for eval but tried to manage at home, now her sx have progressed.     Of note pt was prev evaluated for lung transplant at [place], but during testing had NSTEMI requiring PCI x 6 in 2016, and was then on Effient for 1 year and recommended repeat testing/rehab prior to subsequent evaluation.     VS: 97.9, 108, 131/69, 22, 96% 3LNC.  Labs: leukocytosis 12.1 with neutrophil predominance, rest of cbc unrevealing, cmp with relatively stable CKD2/3, hyperglycemia, vbg with compensated respiratory acidosis and normal lactate. CXR prelim no acute findings, no infiltrate. In ER pt received duonebs x 3, solumedrol 125 x1 prior to medicine team involvement. Pt endorses improvement in dyspnea since treatment in ER. (2018 21:28)      ]All the history reviewed : Pt is in hospital for many days and now and she is frustrated that her breathing is not improving and now she also started having thigh pain and feeling pretty weak: She has no significant cough and is on the maximum therapy for COPD        ?FOLLOWING PRESENT  [x ] Hx of PE/DVT, [y ] Hx COPD, [x ] Hx of Asthma, [x ] Hx of Hospitalization, [y ]  Hx of BiPAP/CPAP use, [ y] Hx of NILA    Allergies    No Known Allergies    Intolerances        PAST MEDICAL & SURGICAL HISTORY:  Hyperlipemia  Chronic sinusitis  Raynaud phenomenon  HTN (hypertension)  DM (diabetes mellitus)  Claustrophobia  COPD (chronic obstructive pulmonary disease)  S/P tonsillectomy      FAMILY HISTORY:  FH: MI (myocardial infarction) (Father)  FH: CABG (coronary artery bypass surgery) (Father)      Social History: [ esx smoker: quit 3 years ago ] TOBACCO                  [ x ] ETOH                                 [  ] IVDA/DRUGS    REVIEW OF SYSTEMS      General:	x    Skin/Breast:x  	  Ophthalmologic:x  	  ENMT:	x    Respiratory and Thorax: SOB, NIELSON   	  Cardiovascular:	x    Gastrointestinal:	x    Genitourinary:	x    Musculoskeletal:	 Thigh pain     Neurological:	x    Psychiatric:	x    Hematology/Lymphatics:	 leg swelling    Endocrine:	x    Allergic/Immunologic:	x    MEDICATIONS  (STANDING):  ALBUTerol/ipratropium for Nebulization 3 milliLiter(s) Nebulizer <User Schedule>  amLODIPine   Tablet 5 milliGRAM(s) Oral daily  artificial tears (preservative free) Ophthalmic Solution 1 Drop(s) Both EYES three times a day  aspirin enteric coated 81 milliGRAM(s) Oral daily  Biotene Dry Mouth Oral Rinse 5 milliLiter(s) Swish and Spit two times a day  buDESOnide   0.5 milliGRAM(s) Respule 0.5 milliGRAM(s) Inhalation every 12 hours  cholecalciferol 2000 Unit(s) Oral daily  dextrose 5%. 1000 milliLiter(s) (50 mL/Hr) IV Continuous <Continuous>  dextrose 50% Injectable 12.5 Gram(s) IV Push once  dextrose 50% Injectable 25 Gram(s) IV Push once  dextrose 50% Injectable 25 Gram(s) IV Push once  docusate sodium 100 milliGRAM(s) Oral daily  enoxaparin Injectable 40 milliGRAM(s) SubCutaneous every 24 hours  insulin glargine Injectable (LANTUS) 16 Unit(s) SubCutaneous at bedtime  insulin lispro (HumaLOG) corrective regimen sliding scale   SubCutaneous three times a day before meals  insulin lispro (HumaLOG) corrective regimen sliding scale   SubCutaneous at bedtime  insulin lispro Injectable (HumaLOG) 8 Unit(s) SubCutaneous before lunch  insulin lispro Injectable (HumaLOG) 8 Unit(s) SubCutaneous with dinner  isosorbide   mononitrate ER Tablet (IMDUR) 30 milliGRAM(s) Oral daily  melatonin 1 milliGRAM(s) Oral at bedtime  montelukast 10 milliGRAM(s) Oral daily  multivitamin 1 Tablet(s) Oral daily  nystatin    Suspension 081387 Unit(s) Oral four times a day  pantoprazole    Tablet 40 milliGRAM(s) Oral before breakfast  polyethylene glycol 3350 17 Gram(s) Oral daily  predniSONE   Tablet 40 milliGRAM(s) Oral daily  senna 2 Tablet(s) Oral at bedtime  theophylline ER Capsule 400 milliGRAM(s) Oral daily    MEDICATIONS  (PRN):  bisacodyl Suppository 10 milliGRAM(s) Rectal daily PRN Constipation  dextrose 40% Gel 15 Gram(s) Oral once PRN Blood Glucose LESS THAN 70 milliGRAM(s)/deciliter  glucagon  Injectable 1 milliGRAM(s) IntraMuscular once PRN Glucose LESS THAN 70 milligrams/deciliter  ondansetron Injectable 4 milliGRAM(s) IV Push every 8 hours PRN Nausea and/or Vomiting  sodium chloride 0.65% Nasal 1 Spray(s) Both Nostrils two times a day PRN Nasal Congestion       Vital Signs Last 24 Hrs  T(C): 36.6 (15 Dec 2018 10:13), Max: 36.6 (15 Dec 2018 05:12)  T(F): 97.8 (15 Dec 2018 10:13), Max: 97.8 (15 Dec 2018 05:12)  HR: 87 (15 Dec 2018 10:13) (87 - 99)  BP: 125/63 (15 Dec 2018 10:13) (97/62 - 135/75)  BP(mean): --  RR: 20 (15 Dec 2018 10:13) (18 - 22)  SpO2: 87% (15 Dec 2018 10:13) (87% - 100%)        I&O's Summary    14 Dec 2018 07:01  -  15 Dec 2018 07:00  --------------------------------------------------------  IN: 1800 mL / OUT: 2150 mL / NET: -350 mL        Physical Exam:   GENERAL: Obese  HEENT: MITCH/   Atraumatic, Normocephalic  ENMT: No tonsillar erythema, exudates, or enlargement; Moist mucous membranes, Good dentition, No lesions  NECK: Supple, No JVD, Normal thyroid  CHEST/LUNG: Mild expiratory wheezing  CVS: Regular rate and rhythm; No murmurs, rubs, or gallops  GI: : Soft, Nontender, Nondistended; Bowel sounds present  NERVOUS SYSTEM:  Alert & Oriented X3  EXTREMITIES:  + edema  LYMPH: No lymphadenopathy noted  SKIN: No rashes or lesions  ENDOCRINOLOGY: No Thyromegaly  PSYCH: Appropriate    Labs:  ABG - ( 15 Dec 2018 11:00 )  pH, Arterial: 7.48  pH, Blood: x     /  pCO2: 52    /  pO2: 121   / HCO3: 38    / Base Excess: 12.4  /  SaO2: 99                                          13.0   13.1  )-----------( 150      ( 15 Dec 2018 12:34 )             38.4                         13.7   15.4  )-----------( 189      ( 14 Dec 2018 13:58 )             40.7                         14.2   16.5  )-----------( 183      ( 13 Dec 2018 14:00 )             42.3                         13.8   18.59 )-----------( 190      ( 12 Dec 2018 07:49 )             40.4     12-15    130<L>  |  84<L>  |  52<H>  ----------------------------<  205<H>  3.8   |  38<H>  |  1.27      131<L>  |  86<L>  |  57<H>  ----------------------------<  202<H>  4.8   |  35<H>  |  1.44<H>  12-14    132<L>  |  88<L>  |  63<H>  ----------------------------<  123<H>  5.4<H>   |  35<H>  |  1.72<H>  12-13    130<L>  |  89<L>  |  60<H>  ----------------------------<  80  6.1<H>   |  30  |  1.56<H>  12-13    128<L>  |  86<L>  |  66<H>  ----------------------------<  108<H>  5.8<H>   |  28  |  1.82<H>  12-12    127<L>  |  89<L>  |  57<H>  ----------------------------<  104<H>  6.0<H>   |  22  |  1.43<H>    Ca    8.5      15 Dec 2018 12:34  Ca    8.8      14 Dec 2018 13:58  Ca    9.0      14 Dec 2018 03:28  Ca    9.7      13 Dec 2018 14:00  Phos  2.8     12-15  Phos  3.7     12-14  Phos  4.1     12-13  Mg     2.2     12-15  Mg     2.3     12-14  Mg     2.3     12-13      CAPILLARY BLOOD GLUCOSE      POCT Blood Glucose.: 120 mg/dL (15 Dec 2018 13:14)  POCT Blood Glucose.: 214 mg/dL (15 Dec 2018 12:01)  POCT Blood Glucose.: 188 mg/dL (15 Dec 2018 08:48)  POCT Blood Glucose.: 136 mg/dL (15 Dec 2018 02:00)  POCT Blood Glucose.: 105 mg/dL (14 Dec 2018 22:30)  POCT Blood Glucose.: 42 mg/dL (14 Dec 2018 22:11)  POCT Blood Glucose.: 38 mg/dL (14 Dec 2018 22:10)  POCT Blood Glucose.: 87 mg/dL (14 Dec 2018 17:25)        Urinalysis Basic - ( 13 Dec 2018 16:09 )    Color: Yellow / Appearance: Clear / S.022 / pH: x  Gluc: x / Ketone: Negative  / Bili: Negative / Urobili: Negative mg/dL   Blood: x / Protein: Negative mg/dL / Nitrite: Negative   Leuk Esterase: Negative / RBC: x / WBC x   Sq Epi: x / Non Sq Epi: x / Bacteria: x      D DImer  Serum Pro-Brain Natriuretic Peptide: 254 pg/mL ( @ 14:00)      Studies  Chest X-RAY  CT SCAN Chest   CT Abdomen  Venous Dopplers: LE:   Others      < from: Xray Chest 1 View- PORTABLE-Routine (18 @ 13:10) >    PROCEDURE DATE:  2018            INTERPRETATION:  HISTORY: Shortness of breath    TECHNIQUE: Portable frontal chest x-ray    COMPARISON: Chest x-ray from 2018    FINDINGS:    No focal consolidation.  There is no pneumothorax. There are no pleural effusions.   The cardiomediastinal silhouette cannot be adequately assessed on this   projection.    IMPRESSION:   Clear lungs.                 JEANIE SPEARS M.D., RADIOLOGY RESIDENT  This document has been electronically signed.  JEYSON SANCHEZ M.D., ATTENDING RADIOLOGIST  This document has been electronically signed. Dec 13 2018  3:28PM    < end of copied text >          < from: Transthoracic Echocardiogram (18 @ 08:59) >  Inadequate tricuspid regurgitation Doppler envelope  precludes estimation of RVSP.  ------------------------------------------------------------------------  Conclusions:  1. Normal mitral valve.  2. Normal trileaflet aortic valve. Peak transaortic valve  gradient equals 6 mm Hg, mean transaortic valve gradient  equals 3 mm Hg, aortic valve velocity time integral equals  22 cm. Trace aortic regurgitation.  3. Aortic Root: 2.9 cm.  4. Normal left atrium.  LA volume index = 18 cc/m2.  5. Normal left ventricular internal dimensions and wall  thicknesses.  6. Normal left ventricular systolic function. No segmental  wall motion abnormalities.  7. Mild diastolic dysfunction (Stage I).  8. Normal right ventricular size and function.  9. Inadequate tricuspid regurgitation Doppler envelope  precludes estimation of RVSP.  10. Normal tricuspid valve. Minimal tricuspid  regurgitation.  *** Compared with echocardiogram report of 2014, no  significant changes noted.  ------------------------------------------------------------------------  Confirmed on  2018 - 13:49:43 by Shefali Gómez M.D.  ------------------------------------------------------------------------    < end of copied text >

## 2018-12-15 NOTE — PROGRESS NOTE ADULT - PROBLEM SELECTOR PLAN 1
in setting of ARB use and hyperglycemia, most likely due to low elida/renin state; now in setting of AURORA and acidosis. Was taken off Benicar, and switched to Norvasc and K improved. Has been on steroids- less likely any adrenal insufficiency.  - Still pending renal and bladder sonogram to r/o retention.   - C/w Bipap to help improving CO2 retention resulting in respiratory acidosis.  - ABG w/ pH of 7.4. Serum bicarb up to 35.    Can give Diamox 250mg IV x1 today as serum bicarb rising and hold lasix.     Check bicarb tomorrow- if downtrending, can give lasix again.  - Continue to control blood glucose.   - Low K diet.  - Monitor K daily.  - Avoid ACEi/ARB therapy.

## 2018-12-15 NOTE — PROGRESS NOTE ADULT - PROBLEM SELECTOR PLAN 1
-test BG AC/HS  -Decrease Lantus 12 units QHS  -Adjust Humalog 8 units TID w/meals (hold if not eating)  -c/w Humalog moderate correction scale AC and Mod HS scale  Please notify endocrine when steroids further tapered. Will need adjustment to insulin regimen.  -Plan discussed with pt/team.  pager: [phone]

## 2018-12-15 NOTE — PROGRESS NOTE ADULT - PROBLEM SELECTOR PLAN 1
Prednisone 40mg qd   CTA showed no PE, no PNA.  Continue Pulmicort, Duoneb ATC (with transition back to spiriva upon discharge), Theophylline, and Singulair.  Completed 7 days of biaxin   Echo report noted, mild diastolic dysfunction, no significant changes from prior study in 2014.  Home Trilogy vent arranged by pulmonary.  minimal improvement clinically  c/w bipap   pulmonary reccs noted  check abg today  Pulm follow up with [facility]/transplant list

## 2018-12-15 NOTE — PROGRESS NOTE ADULT - SUBJECTIVE AND OBJECTIVE BOX
[facility] DIVISION OF KIDNEY DISEASES AND HYPERTENSION -- FOLLOW UP NOTE  --------------------------------------------------------------------------------  Woody Edmond  [id number]  --------------------------------------------------------------------------------  Chief Complaint: Hyperkalemia    24 hour events/subjective: Seen and examined at the bedside.  Feels frustrated because of not feeling well. Hyperkalemia resolved.        PAST HISTORY  --------------------------------------------------------------------------------  No significant changes to PMH, PSH, FHx, SHx, unless otherwise noted    ALLERGIES & MEDICATIONS  --------------------------------------------------------------------------------  Allergies    No Known Allergies    Intolerances      Standing Inpatient Medications  ALBUTerol/ipratropium for Nebulization 3 milliLiter(s) Nebulizer <User Schedule>  amLODIPine   Tablet 5 milliGRAM(s) Oral daily  artificial tears (preservative free) Ophthalmic Solution 1 Drop(s) Both EYES three times a day  aspirin enteric coated 81 milliGRAM(s) Oral daily  Biotene Dry Mouth Oral Rinse 5 milliLiter(s) Swish and Spit two times a day  buDESOnide   0.5 milliGRAM(s) Respule 0.5 milliGRAM(s) Inhalation every 12 hours  cholecalciferol 2000 Unit(s) Oral daily  dextrose 5%. 1000 milliLiter(s) IV Continuous <Continuous>  dextrose 50% Injectable 12.5 Gram(s) IV Push once  dextrose 50% Injectable 25 Gram(s) IV Push once  dextrose 50% Injectable 25 Gram(s) IV Push once  docusate sodium 100 milliGRAM(s) Oral daily  enoxaparin Injectable 40 milliGRAM(s) SubCutaneous every 24 hours  insulin glargine Injectable (LANTUS) 16 Unit(s) SubCutaneous at bedtime  insulin lispro (HumaLOG) corrective regimen sliding scale   SubCutaneous three times a day before meals  insulin lispro (HumaLOG) corrective regimen sliding scale   SubCutaneous at bedtime  insulin lispro Injectable (HumaLOG) 8 Unit(s) SubCutaneous before lunch  insulin lispro Injectable (HumaLOG) 8 Unit(s) SubCutaneous with dinner  isosorbide   mononitrate ER Tablet (IMDUR) 30 milliGRAM(s) Oral daily  melatonin 1 milliGRAM(s) Oral at bedtime  montelukast 10 milliGRAM(s) Oral daily  multivitamin 1 Tablet(s) Oral daily  nystatin    Suspension 362479 Unit(s) Oral four times a day  pantoprazole    Tablet 40 milliGRAM(s) Oral before breakfast  polyethylene glycol 3350 17 Gram(s) Oral daily  predniSONE   Tablet 40 milliGRAM(s) Oral daily  senna 2 Tablet(s) Oral at bedtime  theophylline ER Capsule 400 milliGRAM(s) Oral daily    PRN Inpatient Medications  bisacodyl Suppository 10 milliGRAM(s) Rectal daily PRN  dextrose 40% Gel 15 Gram(s) Oral once PRN  glucagon  Injectable 1 milliGRAM(s) IntraMuscular once PRN  ondansetron Injectable 4 milliGRAM(s) IV Push every 8 hours PRN  sodium chloride 0.65% Nasal 1 Spray(s) Both Nostrils two times a day PRN      REVIEW OF SYSTEMS  --------------------------------------------------------------------------------  Review Of Systems:  Constitutional:No Fever,  Chills,  Fatigue, Weight change   HEENT: No Blurred vision, Eye Pain, Headache, Runny nose, Sore Throat   Respiratory: c/o shortness of breath  Cardiovascular: No Chest Pain, Palpitations, NIELSON, PND, Orthopnea  Gastrointestinal: No Nausea, Vomiting, Abdominal Pain,  Diarrhea, Constipation, Hemorrhoids  Genitourinary: No  Nocturia, Hematuria,  Dysuria, Incontinence, Foam in urine  Extremities: b/l extremity swelling  Neurologic:  No Focal deficit, Paresthesias, Syncope  Lymphatic:   No Lymphadenopathy   Skin: No Rash,  Ecchymoses , Wounds, Lesions  Psychiatry: Denies Depression,  Suicidal/Homicidal Ideation, Anxiety, Sleep Disturbances    All other systems were reviewed and are negative, except as noted.    VITALS/PHYSICAL EXAM  --------------------------------------------------------------------------------  T(C): 36.6 (12-15-18 @ 10:13), Max: 36.6 (12-15-18 @ 05:12)  HR: 87 (12-15-18 @ 10:13) (87 - 99)  BP: 125/63 (12-15-18 @ 10:13) (97/62 - 135/75)  RR: 20 (12-15-18 @ 10:13) (18 - 22)  SpO2: 87% (12-15-18 @ 10:13) (87% - 100%)  Wt(kg): --      Daily     Daily   I&O's Summary    14 Dec 2018 07:01  -  15 Dec 2018 07:00  --------------------------------------------------------  IN: 1800 mL / OUT: 2150 mL / NET: -350 mL          12-14-18 @ 07:01  -  12-15-18 @ 07:00  --------------------------------------------------------  IN: 1800 mL / OUT: 2150 mL / NET: -350 mL        Physical Exam:  Gen: NAD  HEENT: anicteric  Pulm: CTA B/L   CVS: RRR,  Normal S1 S2  Abd: soft, nontender, nondistended  Neuro: AAO x3, no asterixis   Back: No dependent edema  : No naranjo  LE: Warm, no edema  Skin: Warm, without rashe  Vascular access: none    LABS/STUDIES  --------------------------------------------------------------------------------              13.0   13.1  >-----------<  150      [12-15-18 @ 12:34]              38.4     130  |  84  |  52  ----------------------------<  205      [12-15-18 @ 12:34]  3.8   |  38  |  1.27        Ca     8.5     [12-15-18 @ 12:34]      Mg     2.2     [12-15-18 @ 12:34]      Phos  2.8     [12-15-18 @ 12:34]            Creatinine Trend:  SCr 1.27 [12-15 @ 12:34]  SCr 1.44 [12-14 @ 13:58]  SCr 1.72 [12-14 @ 03:28]  SCr 1.56 [12-13 @ 14:00]  SCr 1.82 [12-13 @ 01:53]    Urinalysis - [12-13-18 @ 16:09]      Color Yellow / Appearance Clear / SG 1.022 / pH 5.5      Gluc Negative / Ketone Negative  / Bili Negative / Urobili Negative       Blood Negative / Protein Negative / Leuk Est Negative / Nitrite Negative      RBC  / WBC  / Hyaline  / Gran  / Sq Epi  / Non Sq Epi  / Bacteria     Urine Creatinine 76      [12-13-18 @ 14:06]  Urine Sodium 20      [12-13-18 @ 14:06]  Urine Potassium 50      [12-13-18 @ 14:06]    HbA1c 7.2      [11-30-18 @ 07:58]          Radiology  --------------------------------------------------------------------------------    --------------------------------------------------------------------------------  Woody Edmond  [id number]

## 2018-12-15 NOTE — PROGRESS NOTE ADULT - PROBLEM SELECTOR PLAN 3
A1C 7.2, but with hypoglycemic episode last night  - Endocrine reccs appreciated  -Decrease Lantus 12 units QHS  -Adjust Humalog 8 units TID w/meals (hold if not eating)  -c/w Humalog moderate correction scale AC and Mod HS scale  Continue to monitor blood sugars.

## 2018-12-15 NOTE — PROGRESS NOTE ADULT - PROBLEM SELECTOR PLAN 4
downtrending cr 1.44 today  Nephrology following  pt with b/l UE swelling ; may benefit from a dose of IV Lasix 40mg today ; will d/w Nephrology

## 2018-12-15 NOTE — CONSULT NOTE ADULT - PROBLEM SELECTOR RECOMMENDATION 2
Pt is on Bipap: she has metabolic alkalosis and her hco3 is increasing: This needs to be watched closely as she may need Diamox for a day or two:
c/w home BP med - olmesartan 20 mg daily oral

## 2018-12-15 NOTE — PROGRESS NOTE ADULT - PROBLEM SELECTOR PLAN 2
possibly from retention. Baseline sCr 1-1.3. UA bland. SCr improved to 1.2 today.  - Bladder sono to r/o retention.  - Obtain renal US.  - Monitor BMP  - Dose meds renally

## 2018-12-15 NOTE — PROGRESS NOTE ADULT - SUBJECTIVE AND OBJECTIVE BOX
Patient is a 58y old  Female who presents with a chief complaint of dyspnea (15 Dec 2018 10:05)      SUBJECTIVE / OVERNIGHT EVENTS:  Pt seen and examined. Pt w/hypoglycemic episode in 30's at bedtime last night. Corrected w/juice. Pt reports feeling symptomatic at that time.   Currently denies any new c/o. Pt is frustrated about not feeling better. Seen by Pulmo, Endocrine this am; reccs noted.      MEDICATIONS  (STANDING):  ALBUTerol/ipratropium for Nebulization 3 milliLiter(s) Nebulizer <User Schedule>  amLODIPine   Tablet 5 milliGRAM(s) Oral daily  artificial tears (preservative free) Ophthalmic Solution 1 Drop(s) Both EYES three times a day  aspirin enteric coated 81 milliGRAM(s) Oral daily  Biotene Dry Mouth Oral Rinse 5 milliLiter(s) Swish and Spit two times a day  buDESOnide   0.5 milliGRAM(s) Respule 0.5 milliGRAM(s) Inhalation every 12 hours  cholecalciferol 2000 Unit(s) Oral daily  dextrose 5%. 1000 milliLiter(s) (50 mL/Hr) IV Continuous <Continuous>  dextrose 50% Injectable 12.5 Gram(s) IV Push once  dextrose 50% Injectable 25 Gram(s) IV Push once  dextrose 50% Injectable 25 Gram(s) IV Push once  docusate sodium 100 milliGRAM(s) Oral daily  enoxaparin Injectable 40 milliGRAM(s) SubCutaneous every 24 hours  insulin glargine Injectable (LANTUS) 16 Unit(s) SubCutaneous at bedtime  insulin lispro (HumaLOG) corrective regimen sliding scale   SubCutaneous three times a day before meals  insulin lispro (HumaLOG) corrective regimen sliding scale   SubCutaneous at bedtime  insulin lispro Injectable (HumaLOG) 8 Unit(s) SubCutaneous before lunch  insulin lispro Injectable (HumaLOG) 8 Unit(s) SubCutaneous with dinner  isosorbide   mononitrate ER Tablet (IMDUR) 30 milliGRAM(s) Oral daily  melatonin 1 milliGRAM(s) Oral at bedtime  montelukast 10 milliGRAM(s) Oral daily  multivitamin 1 Tablet(s) Oral daily  nystatin    Suspension 616054 Unit(s) Oral four times a day  pantoprazole    Tablet 40 milliGRAM(s) Oral before breakfast  polyethylene glycol 3350 17 Gram(s) Oral daily  predniSONE   Tablet 40 milliGRAM(s) Oral daily  senna 2 Tablet(s) Oral at bedtime  theophylline ER Capsule 400 milliGRAM(s) Oral daily    MEDICATIONS  (PRN):  bisacodyl Suppository 10 milliGRAM(s) Rectal daily PRN Constipation  dextrose 40% Gel 15 Gram(s) Oral once PRN Blood Glucose LESS THAN 70 milliGRAM(s)/deciliter  glucagon  Injectable 1 milliGRAM(s) IntraMuscular once PRN Glucose LESS THAN 70 milligrams/deciliter  ondansetron Injectable 4 milliGRAM(s) IV Push every 8 hours PRN Nausea and/or Vomiting  sodium chloride 0.65% Nasal 1 Spray(s) Both Nostrils two times a day PRN Nasal Congestion      Vital Signs Last 24 Hrs  T(C): 36.6 (15 Dec 2018 05:12), Max: 36.6 (15 Dec 2018 05:12)  T(F): 97.8 (15 Dec 2018 05:12), Max: 97.8 (15 Dec 2018 05:12)  HR: 89 (15 Dec 2018 08:37) (87 - 99)  BP: 135/75 (15 Dec 2018 05:12) (97/62 - 135/75)  BP(mean): --  RR: 20 (15 Dec 2018 05:12) (18 - 22)  SpO2: 96% (15 Dec 2018 08:37) (95% - 100%)  CAPILLARY BLOOD GLUCOSE      POCT Blood Glucose.: 188 mg/dL (15 Dec 2018 08:48)  POCT Blood Glucose.: 136 mg/dL (15 Dec 2018 02:00)  POCT Blood Glucose.: 105 mg/dL (14 Dec 2018 22:30)  POCT Blood Glucose.: 42 mg/dL (14 Dec 2018 22:11)  POCT Blood Glucose.: 38 mg/dL (14 Dec 2018 22:10)  POCT Blood Glucose.: 87 mg/dL (14 Dec 2018 17:25)  POCT Blood Glucose.: 203 mg/dL (14 Dec 2018 12:13)    I&O's Summary    14 Dec 2018 07:01  -  15 Dec 2018 07:00  --------------------------------------------------------  IN: 1800 mL / OUT: 2150 mL / NET: -350 mL        PHYSICAL EXAM:  GENERAL: NAD, anicteric, afebrile  HEAD:  Atraumatic, Normocephalic  EYES: EOMI, PERRLA, conjunctiva and sclera clear  NECK: Supple, No JVD  CHEST/LUNG: Clear to auscultation bilaterally; No wheeze  HEART: Regular rate and rhythm; No murmurs, rubs, or gallops  ABDOMEN: Soft, Nontender, Nondistended; Bowel sounds present  EXTREMITIES:  2+ Peripheral Pulses, No clubbing, cyanosis, or edema  PSYCH: AAOx3  NEUROLOGY: non-focal  SKIN: No rashes or lesions    LABS:                        13.7   15.4  )-----------( 189      ( 14 Dec 2018 13:58 )             40.7     12-14    131<L>  |  86<L>  |  57<H>  ----------------------------<  202<H>  4.8   |  35<H>  |  1.44<H>    Ca    8.8      14 Dec 2018 13:58  Phos  3.7     12-14  Mg     2.3     12-14            Urinalysis Basic - ( 13 Dec 2018 16:09 )    Color: Yellow / Appearance: Clear / S.022 / pH: x  Gluc: x / Ketone: Negative  / Bili: Negative / Urobili: Negative mg/dL   Blood: x / Protein: Negative mg/dL / Nitrite: Negative   Leuk Esterase: Negative / RBC: x / WBC x   Sq Epi: x / Non Sq Epi: x / Bacteria: x          Consultant(s) Notes Reviewed:  Pulmonary, Endocrine

## 2018-12-15 NOTE — PROGRESS NOTE ADULT - ASSESSMENT
57 y/o F w/h/o controlled T2DM on Metformin 500mg bid. Also h/o CAD and COPD. Here with COPD exacerbation now on Prednisone taper. Hypoglycemic episode w/insulin requirements coming down. BG goal (100-180mg/dl). Will decrease basal/bolus to prevent further incidents. Discussed importance of maintaining consistency w/carb intake meal to meal. Discussed different carb alternatives as pt has snacks at bedside that she can sub if not eating the meal.

## 2018-12-15 NOTE — PROGRESS NOTE ADULT - PROBLEM SELECTOR PLAN 5
Thought to be due to secondary to Benicar and hyperglycemia.  Benicar has been discontinued.  Continue Norvasc 5 mg daily.  Glycemic control as above.  Pt with metabolic alk 2/2 chronic resp acidosis   K 4.8 yesterday ; f/up bmp today  Nephrology is following

## 2018-12-15 NOTE — PROGRESS NOTE ADULT - ASSESSMENT
58yoF w/ advanced COPD on home O2 3L (being evaluated at [facility] for lung transplant), HTN, HLD, CAD s/p 6 stents, DMII on metformin, PVD, who p/w progressive worsening dyspnea x 3 weeks c/w COPD exacerbation. Renal called for persistent hyperkalemia.

## 2018-12-16 LAB
ANION GAP SERPL CALC-SCNC: 10 MMOL/L — SIGNIFICANT CHANGE UP (ref 5–17)
BUN SERPL-MCNC: 40 MG/DL — HIGH (ref 7–23)
CALCIUM SERPL-MCNC: 8.8 MG/DL — SIGNIFICANT CHANGE UP (ref 8.4–10.5)
CHLORIDE SERPL-SCNC: 86 MMOL/L — LOW (ref 96–108)
CO2 SERPL-SCNC: 33 MMOL/L — HIGH (ref 22–31)
CREAT SERPL-MCNC: 0.9 MG/DL — SIGNIFICANT CHANGE UP (ref 0.5–1.3)
GAS PNL BLDA: SIGNIFICANT CHANGE UP
GLUCOSE BLDC GLUCOMTR-MCNC: 119 MG/DL — HIGH (ref 70–99)
GLUCOSE BLDC GLUCOMTR-MCNC: 128 MG/DL — HIGH (ref 70–99)
GLUCOSE BLDC GLUCOMTR-MCNC: 194 MG/DL — HIGH (ref 70–99)
GLUCOSE BLDC GLUCOMTR-MCNC: 198 MG/DL — HIGH (ref 70–99)
GLUCOSE BLDC GLUCOMTR-MCNC: 50 MG/DL — LOW (ref 70–99)
GLUCOSE BLDC GLUCOMTR-MCNC: 52 MG/DL — LOW (ref 70–99)
GLUCOSE BLDC GLUCOMTR-MCNC: 65 MG/DL — LOW (ref 70–99)
GLUCOSE BLDC GLUCOMTR-MCNC: 73 MG/DL — SIGNIFICANT CHANGE UP (ref 70–99)
GLUCOSE BLDC GLUCOMTR-MCNC: 74 MG/DL — SIGNIFICANT CHANGE UP (ref 70–99)
GLUCOSE BLDC GLUCOMTR-MCNC: 77 MG/DL — SIGNIFICANT CHANGE UP (ref 70–99)
GLUCOSE BLDC GLUCOMTR-MCNC: 88 MG/DL — SIGNIFICANT CHANGE UP (ref 70–99)
GLUCOSE BLDC GLUCOMTR-MCNC: 93 MG/DL — SIGNIFICANT CHANGE UP (ref 70–99)
GLUCOSE SERPL-MCNC: 175 MG/DL — HIGH (ref 70–99)
HCT VFR BLD CALC: 38.9 % — SIGNIFICANT CHANGE UP (ref 34.5–45)
HGB BLD-MCNC: 12.9 G/DL — SIGNIFICANT CHANGE UP (ref 11.5–15.5)
MAGNESIUM SERPL-MCNC: 2.2 MG/DL — SIGNIFICANT CHANGE UP (ref 1.6–2.6)
MCHC RBC-ENTMCNC: 28.4 PG — SIGNIFICANT CHANGE UP (ref 27–34)
MCHC RBC-ENTMCNC: 33.2 GM/DL — SIGNIFICANT CHANGE UP (ref 32–36)
MCV RBC AUTO: 85.5 FL — SIGNIFICANT CHANGE UP (ref 80–100)
PLATELET # BLD AUTO: 132 K/UL — LOW (ref 150–400)
POTASSIUM SERPL-MCNC: 4.8 MMOL/L — SIGNIFICANT CHANGE UP (ref 3.5–5.3)
POTASSIUM SERPL-SCNC: 4.8 MMOL/L — SIGNIFICANT CHANGE UP (ref 3.5–5.3)
RBC # BLD: 4.55 M/UL — SIGNIFICANT CHANGE UP (ref 3.8–5.2)
RBC # FLD: 13.2 % — SIGNIFICANT CHANGE UP (ref 10.3–14.5)
SODIUM SERPL-SCNC: 129 MMOL/L — LOW (ref 135–145)
WBC # BLD: 10.74 K/UL — HIGH (ref 3.8–10.5)
WBC # FLD AUTO: 10.74 K/UL — HIGH (ref 3.8–10.5)

## 2018-12-16 PROCEDURE — 99233 SBSQ HOSP IP/OBS HIGH 50: CPT

## 2018-12-16 RX ORDER — INSULIN GLARGINE 100 [IU]/ML
12 INJECTION, SOLUTION SUBCUTANEOUS AT BEDTIME
Qty: 0 | Refills: 0 | Status: DISCONTINUED | OUTPATIENT
Start: 2018-12-16 | End: 2018-12-17

## 2018-12-16 RX ORDER — ROFLUMILAST 500 UG/1
250 TABLET ORAL DAILY
Qty: 0 | Refills: 0 | Status: DISCONTINUED | OUTPATIENT
Start: 2018-12-16 | End: 2018-12-16

## 2018-12-16 RX ORDER — DEXTROSE 50 % IN WATER 50 %
25 SYRINGE (ML) INTRAVENOUS ONCE
Qty: 0 | Refills: 0 | Status: COMPLETED | OUTPATIENT
Start: 2018-12-16 | End: 2018-12-16

## 2018-12-16 RX ORDER — INSULIN GLARGINE 100 [IU]/ML
6 INJECTION, SOLUTION SUBCUTANEOUS ONCE
Qty: 0 | Refills: 0 | Status: COMPLETED | OUTPATIENT
Start: 2018-12-16 | End: 2018-12-16

## 2018-12-16 RX ORDER — ACETAZOLAMIDE 250 MG/1
250 TABLET ORAL ONCE
Qty: 0 | Refills: 0 | Status: COMPLETED | OUTPATIENT
Start: 2018-12-16 | End: 2018-12-16

## 2018-12-16 RX ADMIN — Medication 3 MILLILITER(S): at 06:22

## 2018-12-16 RX ADMIN — ENOXAPARIN SODIUM 40 MILLIGRAM(S): 100 INJECTION SUBCUTANEOUS at 21:13

## 2018-12-16 RX ADMIN — Medication 1 TABLET(S): at 11:23

## 2018-12-16 RX ADMIN — Medication 3 MILLILITER(S): at 09:44

## 2018-12-16 RX ADMIN — Medication 500000 UNIT(S): at 11:21

## 2018-12-16 RX ADMIN — Medication 3 MILLILITER(S): at 21:13

## 2018-12-16 RX ADMIN — Medication 81 MILLIGRAM(S): at 11:23

## 2018-12-16 RX ADMIN — Medication 1 DROP(S): at 06:22

## 2018-12-16 RX ADMIN — Medication 25 MILLILITER(S): at 17:33

## 2018-12-16 RX ADMIN — Medication 3 MILLILITER(S): at 13:22

## 2018-12-16 RX ADMIN — INSULIN GLARGINE 6 UNIT(S): 100 INJECTION, SOLUTION SUBCUTANEOUS at 22:09

## 2018-12-16 RX ADMIN — Medication 3 MILLILITER(S): at 17:18

## 2018-12-16 RX ADMIN — Medication 400 MILLIGRAM(S): at 09:54

## 2018-12-16 RX ADMIN — ACETAZOLAMIDE 250 MILLIGRAM(S): 250 TABLET ORAL at 11:16

## 2018-12-16 RX ADMIN — Medication 2000 UNIT(S): at 11:23

## 2018-12-16 RX ADMIN — PANTOPRAZOLE SODIUM 40 MILLIGRAM(S): 20 TABLET, DELAYED RELEASE ORAL at 06:22

## 2018-12-16 RX ADMIN — POLYETHYLENE GLYCOL 3350 17 GRAM(S): 17 POWDER, FOR SOLUTION ORAL at 11:19

## 2018-12-16 RX ADMIN — Medication 1 DROP(S): at 21:14

## 2018-12-16 RX ADMIN — Medication 10 MILLIGRAM(S): at 11:19

## 2018-12-16 RX ADMIN — MONTELUKAST 10 MILLIGRAM(S): 4 TABLET, CHEWABLE ORAL at 11:23

## 2018-12-16 RX ADMIN — Medication 100 MILLIGRAM(S): at 11:23

## 2018-12-16 RX ADMIN — ONDANSETRON 4 MILLIGRAM(S): 8 TABLET, FILM COATED ORAL at 13:19

## 2018-12-16 RX ADMIN — Medication 2: at 08:45

## 2018-12-16 RX ADMIN — Medication 500000 UNIT(S): at 17:18

## 2018-12-16 RX ADMIN — ONDANSETRON 4 MILLIGRAM(S): 8 TABLET, FILM COATED ORAL at 02:34

## 2018-12-16 RX ADMIN — Medication 8 UNIT(S): at 13:21

## 2018-12-16 RX ADMIN — AMLODIPINE BESYLATE 5 MILLIGRAM(S): 2.5 TABLET ORAL at 06:22

## 2018-12-16 RX ADMIN — Medication 0.5 MILLIGRAM(S): at 06:23

## 2018-12-16 RX ADMIN — Medication 1 MILLIGRAM(S): at 21:31

## 2018-12-16 RX ADMIN — Medication 0.5 MILLIGRAM(S): at 17:18

## 2018-12-16 RX ADMIN — Medication 8 UNIT(S): at 08:45

## 2018-12-16 RX ADMIN — ISOSORBIDE MONONITRATE 30 MILLIGRAM(S): 60 TABLET, EXTENDED RELEASE ORAL at 11:24

## 2018-12-16 RX ADMIN — Medication 1 DROP(S): at 13:22

## 2018-12-16 RX ADMIN — Medication 40 MILLIGRAM(S): at 17:18

## 2018-12-16 NOTE — PROGRESS NOTE ADULT - SUBJECTIVE AND OBJECTIVE BOX
Patient is a 58y old  Female who presents with a chief complaint of dyspnea (16 Dec 2018 09:09)      SUBJECTIVE / OVERNIGHT EVENTS:    MEDICATIONS  (STANDING):  acetazolamide Injectable 250 milliGRAM(s) IV Push once  ALBUTerol/ipratropium for Nebulization 3 milliLiter(s) Nebulizer <User Schedule>  amLODIPine   Tablet 5 milliGRAM(s) Oral daily  artificial tears (preservative free) Ophthalmic Solution 1 Drop(s) Both EYES three times a day  aspirin enteric coated 81 milliGRAM(s) Oral daily  Biotene Dry Mouth Oral Rinse 5 milliLiter(s) Swish and Spit two times a day  buDESOnide   0.5 milliGRAM(s) Respule 0.5 milliGRAM(s) Inhalation every 12 hours  cholecalciferol 2000 Unit(s) Oral daily  dextrose 5%. 1000 milliLiter(s) (50 mL/Hr) IV Continuous <Continuous>  dextrose 50% Injectable 12.5 Gram(s) IV Push once  dextrose 50% Injectable 25 Gram(s) IV Push once  dextrose 50% Injectable 25 Gram(s) IV Push once  docusate sodium 100 milliGRAM(s) Oral daily  enoxaparin Injectable 40 milliGRAM(s) SubCutaneous every 24 hours  insulin glargine Injectable (LANTUS) 16 Unit(s) SubCutaneous at bedtime  insulin lispro (HumaLOG) corrective regimen sliding scale   SubCutaneous three times a day before meals  insulin lispro (HumaLOG) corrective regimen sliding scale   SubCutaneous at bedtime  insulin lispro Injectable (HumaLOG) 8 Unit(s) SubCutaneous before lunch  insulin lispro Injectable (HumaLOG) 8 Unit(s) SubCutaneous before breakfast  insulin lispro Injectable (HumaLOG) 8 Unit(s) SubCutaneous with dinner  isosorbide   mononitrate ER Tablet (IMDUR) 30 milliGRAM(s) Oral daily  melatonin 1 milliGRAM(s) Oral at bedtime  montelukast 10 milliGRAM(s) Oral daily  multivitamin 1 Tablet(s) Oral daily  nystatin    Suspension 682028 Unit(s) Oral four times a day  pantoprazole    Tablet 40 milliGRAM(s) Oral before breakfast  polyethylene glycol 3350 17 Gram(s) Oral daily  predniSONE   Tablet 40 milliGRAM(s) Oral daily  senna 2 Tablet(s) Oral at bedtime  theophylline ER Capsule 400 milliGRAM(s) Oral daily    MEDICATIONS  (PRN):  bisacodyl Suppository 10 milliGRAM(s) Rectal daily PRN Constipation  dextrose 40% Gel 15 Gram(s) Oral once PRN Blood Glucose LESS THAN 70 milliGRAM(s)/deciliter  glucagon  Injectable 1 milliGRAM(s) IntraMuscular once PRN Glucose LESS THAN 70 milligrams/deciliter  ondansetron Injectable 4 milliGRAM(s) IV Push every 8 hours PRN Nausea and/or Vomiting  sodium chloride 0.65% Nasal 1 Spray(s) Both Nostrils two times a day PRN Nasal Congestion      Vital Signs Last 24 Hrs  T(C): 36.6 (16 Dec 2018 06:14), Max: 36.7 (15 Dec 2018 21:49)  T(F): 97.9 (16 Dec 2018 06:14), Max: 98 (15 Dec 2018 21:49)  HR: 89 (16 Dec 2018 08:25) (83 - 97)  BP: 146/83 (16 Dec 2018 06:14) (110/65 - 146/83)  BP(mean): --  RR: 18 (16 Dec 2018 06:14) (18 - 18)  SpO2: 99% (16 Dec 2018 08:25) (97% - 100%)  CAPILLARY BLOOD GLUCOSE      POCT Blood Glucose.: 194 mg/dL (16 Dec 2018 10:07)  POCT Blood Glucose.: 198 mg/dL (16 Dec 2018 08:33)  POCT Blood Glucose.: 242 mg/dL (15 Dec 2018 21:44)  POCT Blood Glucose.: 115 mg/dL (15 Dec 2018 17:08)  POCT Blood Glucose.: 120 mg/dL (15 Dec 2018 13:14)  POCT Blood Glucose.: 214 mg/dL (15 Dec 2018 12:01)    I&O's Summary    15 Dec 2018 07:01  -  16 Dec 2018 07:00  --------------------------------------------------------  IN: 120 mL / OUT: 300 mL / NET: -180 mL        PHYSICAL EXAM:  GENERAL: NAD, well-developed  HEAD:  Atraumatic, Normocephalic  EYES: EOMI, PERRLA, conjunctiva and sclera clear  NECK: Supple, No JVD  CHEST/LUNG: Clear to auscultation bilaterally; No wheeze  HEART: Regular rate and rhythm; No murmurs, rubs, or gallops  ABDOMEN: Soft, Nontender, Nondistended; Bowel sounds present  EXTREMITIES:  2+ Peripheral Pulses, No clubbing, cyanosis, or edema  PSYCH: AAOx3  NEUROLOGY: non-focal  SKIN: No rashes or lesions    LABS:                        13.0   13.1  )-----------( 150      ( 15 Dec 2018 12:34 )             38.4     12-15    130<L>  |  84<L>  |  52<H>  ----------------------------<  205<H>  3.8   |  38<H>  |  1.27    Ca    8.5      15 Dec 2018 12:34  Phos  2.8     12-15  Mg     2.2     12-15                RADIOLOGY & ADDITIONAL TESTS:    Imaging Personally Reviewed:    Consultant(s) Notes Reviewed:      Care Discussed with Consultants/Other Providers: Patient is a 58y old  Female who presents with a chief complaint of dyspnea (16 Dec 2018 09:09)      SUBJECTIVE / OVERNIGHT EVENTS:  Pt seen and examined. No acute events overnight. c/o b/l UE/LE swelling which she reports is not improving on IV Lasix.  Seen by Pulmonary Dr Coyle for 2nd opinion per Dr Mathew's request ; reccs appreciated.    MEDICATIONS  (STANDING):  acetazolamide Injectable 250 milliGRAM(s) IV Push once  ALBUTerol/ipratropium for Nebulization 3 milliLiter(s) Nebulizer <User Schedule>  amLODIPine   Tablet 5 milliGRAM(s) Oral daily  artificial tears (preservative free) Ophthalmic Solution 1 Drop(s) Both EYES three times a day  aspirin enteric coated 81 milliGRAM(s) Oral daily  Biotene Dry Mouth Oral Rinse 5 milliLiter(s) Swish and Spit two times a day  buDESOnide   0.5 milliGRAM(s) Respule 0.5 milliGRAM(s) Inhalation every 12 hours  cholecalciferol 2000 Unit(s) Oral daily  dextrose 5%. 1000 milliLiter(s) (50 mL/Hr) IV Continuous <Continuous>  dextrose 50% Injectable 12.5 Gram(s) IV Push once  dextrose 50% Injectable 25 Gram(s) IV Push once  dextrose 50% Injectable 25 Gram(s) IV Push once  docusate sodium 100 milliGRAM(s) Oral daily  enoxaparin Injectable 40 milliGRAM(s) SubCutaneous every 24 hours  insulin glargine Injectable (LANTUS) 16 Unit(s) SubCutaneous at bedtime  insulin lispro (HumaLOG) corrective regimen sliding scale   SubCutaneous three times a day before meals  insulin lispro (HumaLOG) corrective regimen sliding scale   SubCutaneous at bedtime  insulin lispro Injectable (HumaLOG) 8 Unit(s) SubCutaneous before lunch  insulin lispro Injectable (HumaLOG) 8 Unit(s) SubCutaneous before breakfast  insulin lispro Injectable (HumaLOG) 8 Unit(s) SubCutaneous with dinner  isosorbide   mononitrate ER Tablet (IMDUR) 30 milliGRAM(s) Oral daily  melatonin 1 milliGRAM(s) Oral at bedtime  montelukast 10 milliGRAM(s) Oral daily  multivitamin 1 Tablet(s) Oral daily  nystatin    Suspension 305176 Unit(s) Oral four times a day  pantoprazole    Tablet 40 milliGRAM(s) Oral before breakfast  polyethylene glycol 3350 17 Gram(s) Oral daily  predniSONE   Tablet 40 milliGRAM(s) Oral daily  senna 2 Tablet(s) Oral at bedtime  theophylline ER Capsule 400 milliGRAM(s) Oral daily    MEDICATIONS  (PRN):  bisacodyl Suppository 10 milliGRAM(s) Rectal daily PRN Constipation  dextrose 40% Gel 15 Gram(s) Oral once PRN Blood Glucose LESS THAN 70 milliGRAM(s)/deciliter  glucagon  Injectable 1 milliGRAM(s) IntraMuscular once PRN Glucose LESS THAN 70 milligrams/deciliter  ondansetron Injectable 4 milliGRAM(s) IV Push every 8 hours PRN Nausea and/or Vomiting  sodium chloride 0.65% Nasal 1 Spray(s) Both Nostrils two times a day PRN Nasal Congestion      Vital Signs Last 24 Hrs  T(C): 36.6 (16 Dec 2018 06:14), Max: 36.7 (15 Dec 2018 21:49)  T(F): 97.9 (16 Dec 2018 06:14), Max: 98 (15 Dec 2018 21:49)  HR: 89 (16 Dec 2018 08:25) (83 - 97)  BP: 146/83 (16 Dec 2018 06:14) (110/65 - 146/83)  BP(mean): --  RR: 18 (16 Dec 2018 06:14) (18 - 18)  SpO2: 99% (16 Dec 2018 08:25) (97% - 100%)  CAPILLARY BLOOD GLUCOSE      POCT Blood Glucose.: 194 mg/dL (16 Dec 2018 10:07)  POCT Blood Glucose.: 198 mg/dL (16 Dec 2018 08:33)  POCT Blood Glucose.: 242 mg/dL (15 Dec 2018 21:44)  POCT Blood Glucose.: 115 mg/dL (15 Dec 2018 17:08)  POCT Blood Glucose.: 120 mg/dL (15 Dec 2018 13:14)  POCT Blood Glucose.: 214 mg/dL (15 Dec 2018 12:01)    I&O's Summary    15 Dec 2018 07:01  -  16 Dec 2018 07:00  --------------------------------------------------------  IN: 120 mL / OUT: 300 mL / NET: -180 mL        PHYSICAL EXAM:  GENERAL: NAD, anicteric, afebrile  HEAD:  Atraumatic, Normocephalic  EYES: EOMI, PERRLA, conjunctiva and sclera clear  NECK: Supple, No JVD  CHEST/LUNG: Clear to auscultation bilaterally; No wheeze  HEART: Regular rate and rhythm; No murmurs, rubs, or gallops  ABDOMEN: Soft, Nontender, Nondistended; Bowel sounds present  EXTREMITIES:  2+ Peripheral Pulses, b/l UE/LE edema  PSYCH: AAOx3  NEUROLOGY: non-focal  SKIN: No rashes or lesions      LABS:                        13.0   13.1  )-----------( 150      ( 15 Dec 2018 12:34 )             38.4     12-15    130<L>  |  84<L>  |  52<H>  ----------------------------<  205<H>  3.8   |  38<H>  |  1.27    Ca    8.5      15 Dec 2018 12:34  Phos  2.8     12-15  Mg     2.2     12-15                  Consultant(s) Notes Reviewed:  Pulmonary, Nephrology    Care Discussed with Consultants/Other Providers: Pulmonary ( Dr Coyle)

## 2018-12-16 NOTE — PROGRESS NOTE ADULT - PROBLEM SELECTOR PLAN 1
Prednisone 40mg qd   CTA showed no PE, no PNA.  Continue Pulmicort, Duoneb ATC (with transition back to spiriva upon discharge), Theophylline, and Singulair.  Completed 7 days of biaxin   Echo report noted, mild diastolic dysfunction, no significant changes from prior study in 2014.  Home Trilogy vent arranged by pulmonary.  minimal improvement clinically  c/w bipap   pulmonary reccs noted  ; will start Roflumilast 250mcg qd  HCO3 was 38 yesterday ; will give Diamox 250mg IVP today x 1  check ABG thereafter  Pulm follow up with [facility]/transplant list

## 2018-12-16 NOTE — PROGRESS NOTE ADULT - PROBLEM SELECTOR PLAN 8
Pt with hx of right sided heart failure   c/w IV diuresis as tolerated  heart failure follow up   judicious use of IVF

## 2018-12-16 NOTE — PROGRESS NOTE ADULT - SUBJECTIVE AND OBJECTIVE BOX
Follow-up Pulm Progress Note    The patient was seen and examined. Notes reviewed and discussed with staff/team as applicable      No new respiratory events overnight. Remains frustrated over lack of progress. Upper and lower extremity edema. Dr. Coyle's 2nd opinion consultation was noted and is appreciated.    Denies: hest pain, increased cough, colored phlegm, hemoptysis, N/V/D, neck stiffness, dysuria  ROS diffuse weakness    Vital Signs Last 24 Hrs  T(C): 36.6 (16 Dec 2018 06:14), Max: 36.7 (15 Dec 2018 21:49)  T(F): 97.9 (16 Dec 2018 06:14), Max: 98 (15 Dec 2018 21:49)  HR: 89 (16 Dec 2018 08:25) (83 - 97)  BP: 146/83 (16 Dec 2018 06:14) (110/65 - 146/83)  BP(mean): --  RR: 18 (16 Dec 2018 06:14) (18 - 20)  SpO2: 99% (16 Dec 2018 08:25) (87% - 100%)    ABG - ( 15 Dec 2018 11:00 )  pH, Arterial: 7.48  pH, Blood: x     /  pCO2: 52    /  pO2: 121   / HCO3: 38    / Base Excess: 12.4  /  SaO2: 99                    12-15 @ 07:01  -  12-16 @ 07:00  --------------------------------------------------------  IN: 120 mL / OUT: 300 mL / NET: -180 mL          Medications:  MEDICATIONS  (STANDING):  ALBUTerol/ipratropium for Nebulization 3 milliLiter(s) Nebulizer <User Schedule>  amLODIPine   Tablet 5 milliGRAM(s) Oral daily  artificial tears (preservative free) Ophthalmic Solution 1 Drop(s) Both EYES three times a day  aspirin enteric coated 81 milliGRAM(s) Oral daily  Biotene Dry Mouth Oral Rinse 5 milliLiter(s) Swish and Spit two times a day  buDESOnide   0.5 milliGRAM(s) Respule 0.5 milliGRAM(s) Inhalation every 12 hours  cholecalciferol 2000 Unit(s) Oral daily  dextrose 5%. 1000 milliLiter(s) (50 mL/Hr) IV Continuous <Continuous>  dextrose 50% Injectable 12.5 Gram(s) IV Push once  dextrose 50% Injectable 25 Gram(s) IV Push once  dextrose 50% Injectable 25 Gram(s) IV Push once  docusate sodium 100 milliGRAM(s) Oral daily  enoxaparin Injectable 40 milliGRAM(s) SubCutaneous every 24 hours  insulin glargine Injectable (LANTUS) 16 Unit(s) SubCutaneous at bedtime  insulin lispro (HumaLOG) corrective regimen sliding scale   SubCutaneous three times a day before meals  insulin lispro (HumaLOG) corrective regimen sliding scale   SubCutaneous at bedtime  insulin lispro Injectable (HumaLOG) 8 Unit(s) SubCutaneous before lunch  insulin lispro Injectable (HumaLOG) 8 Unit(s) SubCutaneous before breakfast  insulin lispro Injectable (HumaLOG) 8 Unit(s) SubCutaneous with dinner  isosorbide   mononitrate ER Tablet (IMDUR) 30 milliGRAM(s) Oral daily  melatonin 1 milliGRAM(s) Oral at bedtime  montelukast 10 milliGRAM(s) Oral daily  multivitamin 1 Tablet(s) Oral daily  nystatin    Suspension [id number] Unit(s) Oral four times a day  pantoprazole    Tablet 40 milliGRAM(s) Oral before breakfast  polyethylene glycol 3350 17 Gram(s) Oral daily  predniSONE   Tablet 40 milliGRAM(s) Oral daily  senna 2 Tablet(s) Oral at bedtime  theophylline ER Capsule 400 milliGRAM(s) Oral daily    MEDICATIONS  (PRN):  bisacodyl Suppository 10 milliGRAM(s) Rectal daily PRN Constipation  dextrose 40% Gel 15 Gram(s) Oral once PRN Blood Glucose LESS THAN 70 milliGRAM(s)/deciliter  glucagon  Injectable 1 milliGRAM(s) IntraMuscular once PRN Glucose LESS THAN 70 milligrams/deciliter  ondansetron Injectable 4 milliGRAM(s) IV Push every 8 hours PRN Nausea and/or Vomiting  sodium chloride 0.65% Nasal 1 Spray(s) Both Nostrils two times a day PRN Nasal Congestion      Allergies    No Known Allergies      Physical Examination:    white female, alert, oriented no distress  Neck: no JVD, LAD, accessory muscle use  PULM: decreased BS bilaterally  CVS: Regular rate and rhythm, S1S2, no murmurs, rubs, or gallops  Abdomen: soft, NT  Extremities: upper and lower extremity edema  Neuro: non focal but proximal weakness bilaterally      LABS:                        13.0   13.1  )-----------( 150      ( 15 Dec 2018 12:34 )             38.4     12-15    130<L>  |  84<L>  |  52<H>  ----------------------------<  205<H>  3.8   |  38<H>  |  1.27    Ca    8.5      15 Dec 2018 12:34  Phos  2.8     12-15  Mg     2.2     12-15            CAPILLARY BLOOD GLUCOSE      POCT Blood Glucose.: 198 mg/dL (16 Dec 2018 08:33)          Serum Pro-Brain Natriuretic Peptide: 254 pg/mL (12-13-18 @ 14:00)

## 2018-12-16 NOTE — PROGRESS NOTE ADULT - ASSESSMENT
ASSESSMENT:    Severe COPD/chronic respiratory failure/dyspnea    HTN/HLD/DM    CAD s/p PCI    PAD    extremity swelling likely related to steroid induced fluid retention and nocturnal hypoxemia - no evidence of DVT on lower extremity Duplex examination    PLAN/RECOMMENDATIONS:    BIPAP 12/5 with 40% FiO2 on and off during the day and during sleep- continue oxygen supplementation to keep saturation greater than 92% using a nasal canula when off BIPAP  following ABG  home trilogy vent has been arranged with [facility]   albuterol/atrovent nebs q4h holding 2AM dose  pulmicort 0.5mg nebs q12h  singulair/theophylline - consider roflumilast instead of theophylline but effect will be small  prednisone 40mg daily - glucose control steroids - nystatin. Tapering prednisone as side effects seem to be outweighing benefits at this point. Will d/w Dr. Fair  cardiac meds: ASA/imdur/norvasc - discontinue crestor with possible myopathy  diurese as tolerated by renal function and hemodynamics - watch for worsening metabolic alkalosis with loop diuretic use which could lead to worsening hypercapnia. She was prescribed diamox. Will need to f/u ABG to avoid met acidosis which would be poorly tolerated  cardiology evaluation noted - ECHO with preserved LVEF, no evidence of valvular heart disease and no evidence of right ventricular dysfunction despite chronic hypoxemia  DVT prophylaxis - SQ lovenox  physical therapy as able  renal and endocrine evaluation noted  bowel regimen  outpatient pulmonary rehabilitation  outpatient evaluation for lung transplantation    Shaka Sharma MD  Pulmonary Medicine  [phone]

## 2018-12-16 NOTE — PROGRESS NOTE ADULT - PROBLEM SELECTOR PLAN 4
downtrending cr 0.99 today  Nephrology following  pt with b/l UE swelling ; may benefit from a dose of IV Lasix 40mg today ; will d/w Nephrology

## 2018-12-16 NOTE — PROGRESS NOTE ADULT - PROBLEM SELECTOR PLAN 5
thought to be 2/2 benicar and hyperglycemia ; benicar has been d/c   K 4.8 today  Continue Norvasc 5 mg daily.  Glycemic control as above.  Pt with metabolic alk 2/2 chronic resp acidosis   Nephrology is following

## 2018-12-16 NOTE — PROGRESS NOTE ADULT - PROBLEM SELECTOR PLAN 3
A1C 7.2, but with hypoglycemic episode last night  - Endocrine reccs appreciated  -c/w Lantus 12 units QHS  -c/w Humalog 8 units TID w/meals (hold if not eating)  -c/w Humalog moderate correction scale AC and Mod HS scale  Continue to monitor blood sugars.

## 2018-12-16 NOTE — PROVIDER CONTACT NOTE (OTHER) - ACTION/TREATMENT ORDERED:
repeat at 1701 was 73, NP made aware, 25ml of D%) ivp ordered and administered, fs at 1745 was 119. pt currently stable, insulin premeal for dinner held

## 2018-12-16 NOTE — PROGRESS NOTE ADULT - PROBLEM SELECTOR PLAN 6
Dopplers negative for DVT.  Echo report noted, mild diastolic dysfunction, no significant changes from prior study in 2014.  Suspect leg edema may be related to recent high dose steroids.  C/w compression stockings

## 2018-12-16 NOTE — CHART NOTE - NSCHARTNOTEFT_GEN_A_CORE
Patient is a 58y old  Female who presents with a chief complaint of dyspnea (16 Dec 2018 10:31). Alerted by RN that pt refuse to have Lantus tonight due to FS 90 tonight and had one episode of hypoglycemia during the day. Pt eat poorly during the day.      Vital Signs Last 24 Hrs  T(C): 36.4 (16 Dec 2018 21:14), Max: 36.6 (16 Dec 2018 00:36)  T(F): 97.5 (16 Dec 2018 21:14), Max: 97.9 (16 Dec 2018 00:36)  HR: 95 (16 Dec 2018 21:14) (83 - 98)  BP: 111/71 (16 Dec 2018 21:14) (104/67 - 146/83)  BP(mean): --  RR: 18 (16 Dec 2018 21:14) (18 - 20)  SpO2: 97% (16 Dec 2018 21:14) (97% - 100%)      Labs:                          12.9   10.74 )-----------( 132      ( 16 Dec 2018 11:40 )             38.9     12-16    129<L>  |  86<L>  |  40<H>  ----------------------------<  175<H>  4.8   |  33<H>  |  0.90    Ca    8.8      16 Dec 2018 10:19  Phos  2.8     12-15  Mg     2.2     12-16      ABG - ( 16 Dec 2018 20:53 )  pH, Arterial: 7.44  pH, Blood: x     /  pCO2: 53    /  pO2: 173   / HCO3: 36    / Base Excess: 10.0  /  SaO2: 100                 Radiology:    Physical Exam:    Respiratory: CTA B/L  CV: S1S2  Abdominal: Soft, NT, ND no palpable mass      Assessment & Plan:  HPI:  58 F PMH COPD on home O2 3L, HTN, HLD, CAD s/p x6 stents, T2DM, pHTN, PVD, p/w progressive worsening dyspnea x 2 weeks. Pt says she normally uses 3L NC atc at home. Infrequently leaves her house as of late. 2 wks ago, did leave her house a few times, once to go to pulm rehab, and felt much weaker than usual.  Has had increasing dyspnea and fatigue, worse with minimal exertion. Huffing/puffing for breath, sob with talking, showering, etc. +inc sputum volume production, pt says it is white in color, denies purulence. +inc O2 requirement now up to 4LNC at home during last 2 wks. +inc rescue inhaler use upto 7x/daily with minimal improvement.  Denies cough.  No significant inc in wheezing. +some runny nose but denies myalgias, sick contacts, sore throat; +flu shot this year.  Pt denies cp, denies f/c, denies n/v/d, denies orthopnea or significant increase in LE edema. Pt saw her pcp/pulm Dr. Mathew who started her on 10 d course of biaxin and prednisone 60 mg qd, which she has tapered down to 30 mg. Pt has completed a full 7 days of biaxin. Was told to go to ER last week for eval but tried to manage at home, now her sx have progressed.     Of note pt was prev evaluated for lung transplant at [facility], but during testing had NSTEMI requiring PCI x 6 in 2016, and was then on Effient for 1 year and recommended repeat testing/rehab prior to subsequent evaluation.     VS: 97.9, 108, 131/69, 22, 96% 3LNC.  Labs: leukocytosis 12.1 with neutrophil predominance, rest of cbc unrevealing, cmp with relatively stable CKD2/3, hyperglycemia, vbg with compensated respiratory acidosis and normal lactate. CXR prelim no acute findings, no infiltrate. In ER pt received duonebs x 3, solumedrol 125 x1 prior to medicine team involvement. Pt endorses improvement in dyspnea since treatment in ER. (29 Nov 2018 21:28)    Half of routine Lantus dose which is 6 units for tonight.  cont assess and monitor.        Follow up with Attending in AM.

## 2018-12-17 LAB
ANION GAP SERPL CALC-SCNC: 10 MMOL/L — SIGNIFICANT CHANGE UP (ref 5–17)
BUN SERPL-MCNC: 35 MG/DL — HIGH (ref 7–23)
CALCIUM SERPL-MCNC: 8.8 MG/DL — SIGNIFICANT CHANGE UP (ref 8.4–10.5)
CHLORIDE SERPL-SCNC: 86 MMOL/L — LOW (ref 96–108)
CO2 SERPL-SCNC: 31 MMOL/L — SIGNIFICANT CHANGE UP (ref 22–31)
CREAT SERPL-MCNC: 1.07 MG/DL — SIGNIFICANT CHANGE UP (ref 0.5–1.3)
GLUCOSE BLDC GLUCOMTR-MCNC: 144 MG/DL — HIGH (ref 70–99)
GLUCOSE BLDC GLUCOMTR-MCNC: 159 MG/DL — HIGH (ref 70–99)
GLUCOSE BLDC GLUCOMTR-MCNC: 204 MG/DL — HIGH (ref 70–99)
GLUCOSE BLDC GLUCOMTR-MCNC: 300 MG/DL — HIGH (ref 70–99)
GLUCOSE BLDC GLUCOMTR-MCNC: 319 MG/DL — HIGH (ref 70–99)
GLUCOSE BLDC GLUCOMTR-MCNC: 49 MG/DL — LOW (ref 70–99)
GLUCOSE BLDC GLUCOMTR-MCNC: 51 MG/DL — LOW (ref 70–99)
GLUCOSE BLDC GLUCOMTR-MCNC: 98 MG/DL — SIGNIFICANT CHANGE UP (ref 70–99)
GLUCOSE SERPL-MCNC: 283 MG/DL — HIGH (ref 70–99)
HCT VFR BLD CALC: 39 % — SIGNIFICANT CHANGE UP (ref 34.5–45)
HGB BLD-MCNC: 12.9 G/DL — SIGNIFICANT CHANGE UP (ref 11.5–15.5)
MCHC RBC-ENTMCNC: 28.7 PG — SIGNIFICANT CHANGE UP (ref 27–34)
MCHC RBC-ENTMCNC: 33.1 GM/DL — SIGNIFICANT CHANGE UP (ref 32–36)
MCV RBC AUTO: 86.7 FL — SIGNIFICANT CHANGE UP (ref 80–100)
PLATELET # BLD AUTO: 135 K/UL — LOW (ref 150–400)
POTASSIUM SERPL-MCNC: 4.6 MMOL/L — SIGNIFICANT CHANGE UP (ref 3.5–5.3)
POTASSIUM SERPL-SCNC: 4.6 MMOL/L — SIGNIFICANT CHANGE UP (ref 3.5–5.3)
RBC # BLD: 4.5 M/UL — SIGNIFICANT CHANGE UP (ref 3.8–5.2)
RBC # FLD: 13.2 % — SIGNIFICANT CHANGE UP (ref 10.3–14.5)
SODIUM SERPL-SCNC: 127 MMOL/L — LOW (ref 135–145)
WBC # BLD: 11.66 K/UL — HIGH (ref 3.8–10.5)
WBC # FLD AUTO: 11.66 K/UL — HIGH (ref 3.8–10.5)

## 2018-12-17 PROCEDURE — 99233 SBSQ HOSP IP/OBS HIGH 50: CPT

## 2018-12-17 PROCEDURE — 76770 US EXAM ABDO BACK WALL COMP: CPT | Mod: 26

## 2018-12-17 PROCEDURE — 99232 SBSQ HOSP IP/OBS MODERATE 35: CPT

## 2018-12-17 RX ORDER — INSULIN LISPRO 100/ML
6 VIAL (ML) SUBCUTANEOUS
Qty: 0 | Refills: 0 | Status: DISCONTINUED | OUTPATIENT
Start: 2018-12-17 | End: 2018-12-18

## 2018-12-17 RX ORDER — DEXTROSE 50 % IN WATER 50 %
25 SYRINGE (ML) INTRAVENOUS ONCE
Qty: 0 | Refills: 0 | Status: COMPLETED | OUTPATIENT
Start: 2018-12-17 | End: 2018-12-17

## 2018-12-17 RX ORDER — METOCLOPRAMIDE HCL 10 MG
10 TABLET ORAL ONCE
Qty: 0 | Refills: 0 | Status: DISCONTINUED | OUTPATIENT
Start: 2018-12-17 | End: 2018-12-17

## 2018-12-17 RX ORDER — INSULIN GLARGINE 100 [IU]/ML
8 INJECTION, SOLUTION SUBCUTANEOUS ONCE
Qty: 0 | Refills: 0 | Status: COMPLETED | OUTPATIENT
Start: 2018-12-17 | End: 2018-12-17

## 2018-12-17 RX ORDER — INSULIN GLARGINE 100 [IU]/ML
10 INJECTION, SOLUTION SUBCUTANEOUS AT BEDTIME
Qty: 0 | Refills: 0 | Status: DISCONTINUED | OUTPATIENT
Start: 2018-12-17 | End: 2018-12-19

## 2018-12-17 RX ORDER — INSULIN LISPRO 100/ML
6 VIAL (ML) SUBCUTANEOUS
Qty: 0 | Refills: 0 | Status: DISCONTINUED | OUTPATIENT
Start: 2018-12-17 | End: 2018-12-19

## 2018-12-17 RX ADMIN — PANTOPRAZOLE SODIUM 40 MILLIGRAM(S): 20 TABLET, DELAYED RELEASE ORAL at 06:20

## 2018-12-17 RX ADMIN — Medication 40 MILLIGRAM(S): at 18:44

## 2018-12-17 RX ADMIN — ENOXAPARIN SODIUM 40 MILLIGRAM(S): 100 INJECTION SUBCUTANEOUS at 21:38

## 2018-12-17 RX ADMIN — AMLODIPINE BESYLATE 5 MILLIGRAM(S): 2.5 TABLET ORAL at 06:20

## 2018-12-17 RX ADMIN — Medication 2000 UNIT(S): at 12:13

## 2018-12-17 RX ADMIN — Medication 8: at 09:05

## 2018-12-17 RX ADMIN — ONDANSETRON 4 MILLIGRAM(S): 8 TABLET, FILM COATED ORAL at 13:38

## 2018-12-17 RX ADMIN — Medication 25 GRAM(S): at 17:15

## 2018-12-17 RX ADMIN — Medication 3 MILLILITER(S): at 21:38

## 2018-12-17 RX ADMIN — SENNA PLUS 2 TABLET(S): 8.6 TABLET ORAL at 21:38

## 2018-12-17 RX ADMIN — Medication 1 DROP(S): at 06:20

## 2018-12-17 RX ADMIN — Medication 3 MILLILITER(S): at 09:06

## 2018-12-17 RX ADMIN — Medication 8 UNIT(S): at 09:05

## 2018-12-17 RX ADMIN — Medication 3 MILLILITER(S): at 18:43

## 2018-12-17 RX ADMIN — Medication 1 DROP(S): at 21:39

## 2018-12-17 RX ADMIN — INSULIN GLARGINE 8 UNIT(S): 100 INJECTION, SOLUTION SUBCUTANEOUS at 23:30

## 2018-12-17 RX ADMIN — Medication 3 MILLILITER(S): at 06:21

## 2018-12-17 RX ADMIN — Medication 81 MILLIGRAM(S): at 12:14

## 2018-12-17 RX ADMIN — Medication 1 MILLIGRAM(S): at 21:38

## 2018-12-17 RX ADMIN — Medication 3 MILLILITER(S): at 13:36

## 2018-12-17 RX ADMIN — Medication 2: at 13:35

## 2018-12-17 RX ADMIN — Medication 8 UNIT(S): at 13:35

## 2018-12-17 RX ADMIN — ISOSORBIDE MONONITRATE 30 MILLIGRAM(S): 60 TABLET, EXTENDED RELEASE ORAL at 12:13

## 2018-12-17 RX ADMIN — Medication 500000 UNIT(S): at 18:43

## 2018-12-17 RX ADMIN — ONDANSETRON 4 MILLIGRAM(S): 8 TABLET, FILM COATED ORAL at 23:31

## 2018-12-17 RX ADMIN — Medication 400 MILLIGRAM(S): at 09:06

## 2018-12-17 RX ADMIN — Medication 1 TABLET(S): at 12:13

## 2018-12-17 RX ADMIN — Medication 100 MILLIGRAM(S): at 12:13

## 2018-12-17 RX ADMIN — MONTELUKAST 10 MILLIGRAM(S): 4 TABLET, CHEWABLE ORAL at 12:13

## 2018-12-17 RX ADMIN — Medication 0.5 MILLIGRAM(S): at 06:20

## 2018-12-17 RX ADMIN — POLYETHYLENE GLYCOL 3350 17 GRAM(S): 17 POWDER, FOR SOLUTION ORAL at 12:13

## 2018-12-17 RX ADMIN — Medication 0.5 MILLIGRAM(S): at 18:43

## 2018-12-17 RX ADMIN — Medication 1 DROP(S): at 13:36

## 2018-12-17 NOTE — PROGRESS NOTE ADULT - PROBLEM SELECTOR PLAN 1
-test BG AC/HS  -Decrease Lantus to 10 units QHS  -Adjust Humalog 6 units TID w/meals (hold if not eating)  -c/w Humalog moderate correction scale AC and Mod HS scale  Please notify endocrine when steroids further tapered. Will need adjustment to insulin regimen.  -Plan discussed with pt/team.  Contact info: [phone] (24/7). pager [phone]

## 2018-12-17 NOTE — PROGRESS NOTE ADULT - PROBLEM SELECTOR PLAN 3
A1C 7.2, but with hypoglycemic episode last night  - Endocrine reccs appreciated  -c/w Lantus 12 units QHS  -c/w Humalog 8 units TID w/meals (hold if not eating)  -c/w Humalog moderate correction scale AC and Mod HS scale  Continue to monitor blood sugars. A1C 7.2, but with recent hypoglycemic episodes   - Endocrine reccs appreciated  -c/w Lantus 6 units QHS  -c/w Humalog 8 units TID w/meals (hold if not eating)  -c/w Humalog moderate correction scale AC and Mod HS scale  Continue to monitor blood sugars.

## 2018-12-17 NOTE — PROGRESS NOTE ADULT - SUBJECTIVE AND OBJECTIVE BOX
Diabetes Follow up note: Saw pt earlier  Interval Hx: 59 y/o F w/h/o controlled T2DM on Metformin 500mg bid. Also h/o CAD and COPD. Here with COPD exacerbation> remains on Prednisone 40mg daily. Pt w/hypoglycemic episode over the weekend likely due to decrease PO intake> dislikes hospital food so eating mainly protein and small amounts of carbs. Pt reports eating food from restaurant last night (Fillet mignon and potatoes) with BG >300s this am. Pt received  6 units of Lantus last night instead of 16 for BG POC of 93. Pt states she likes potatoes but noted that pt has a restricted potassium diet as well.  No hypoglycemia today. Per staff pt eating on and off without consistency.     Review of Systems:  General: States she ate potatoes because she was tired of having rice every day.  GI: Tolerating POs without any N/V/D/ABD PAIN.  CV: No CP/SOB  ENDO:  no S&Sx of hypoglycemia in last 24 hours.      MEDS:  insulin glargine Injectable (LANTUS) 16 Unit(s) SubCutaneous at bedtime received 6 last night)  insulin lispro (HumaLOG) corrective regimen sliding scale   SubCutaneous three times a day before meals  insulin lispro (HumaLOG) corrective regimen sliding scale   SubCutaneous at bedtime  insulin lispro Injectable (HumaLOG) 8 Unit(s) SubCutaneous before lunch  insulin lispro Injectable (HumaLOG) 8 Unit(s) SubCutaneous before breakfast  insulin lispro Injectable (HumaLOG) 8 Unit(s) SubCutaneous with dinner  predniSONE   Tablet 40 milliGRAM(s) Oral daily  nystatin    Suspension 416629 Unit(s) Oral four times a day  cholecalciferol 2000 Unit(s) Oral daily  predniSONE   Tablet 40 milliGRAM(s) Oral daily  theophylline ER Capsule 400 milliGRAM(s) Oral daily    Allergies    No Known Allergies        PE:   General: Female lying in bed in NAD.   Vital Signs Last 24 Hrs  T(C): 36.8 (12-17-18 @ 06:11), Max: 36.8 (12-17-18 @ 06:11)  T(F): 98.2 (12-17-18 @ 06:11), Max: 98.2 (12-17-18 @ 06:11)  HR: 96 (12-17-18 @ 12:37) (85 - 99)  BP: 112/55 (12-17-18 @ 10:12) (104/67 - 134/75)  BP(mean): --  RR: 18 (12-17-18 @ 10:12) (18 - 20)  SpO2: 100% (12-17-18 @ 12:37) (97% - 100%)  Abd: Soft, NT, ND, Obese.   Extremities: Warm. b/l trace edema  Neuro: A&O X3    LABS:  POCT Blood Glucose.: 159 mg/dL (12-17-18 @ 13:16)  POCT Blood Glucose.: 319 mg/dL (12-17-18 @ 08:55)  POCT Blood Glucose.: 300 mg/dL (12-17-18 @ 08:54)  POCT Blood Glucose.: 93 mg/dL (12-16-18 @ 21:18)  POCT Blood Glucose.: 119 mg/dL (12-16-18 @ 17:45)  POCT Blood Glucose.: 74 mg/dL (12-16-18 @ 17:23)  POCT Blood Glucose.: 73 mg/dL (12-16-18 @ 17:01)  POCT Blood Glucose.: 88 mg/dL (12-16-18 @ 16:44)  POCT Blood Glucose.: 77 mg/dL (12-16-18 @ 16:29)  POCT Blood Glucose.: 65 mg/dL (12-16-18 @ 16:10)  POCT Blood Glucose.: 50 mg/dL (12-16-18 @ 15:55)  POCT Blood Glucose.: 52 mg/dL (12-16-18 @ 15:54)  POCT Blood Glucose.: 128 mg/dL (12-16-18 @ 13:17)  POCT Blood Glucose.: 194 mg/dL (12-16-18 @ 10:07)  POCT Blood Glucose.: 198 mg/dL (12-16-18 @ 08:33)  POCT Blood Glucose.: 242 mg/dL (12-15-18 @ 21:44)  POCT Blood Glucose.: 115 mg/dL (12-15-18 @ 17:08)                          12.9   10.74 )-----------( 132      ( 16 Dec 2018 11:40 )             38.9     12-17    127<L>  |  86<L>  |  35<H>  ----------------------------<  283<H>  4.6   |  31  |  1.07    Ca    8.8      17 Dec 2018 09:50  Mg     2.2     12-16       Hemoglobin A1C, Whole Blood: 7.2 % <H> [4.0 - 5.6] (11-30-18 @ 07:58) Diabetes Follow up note: Saw pt earlier  Interval Hx: 59 y/o F w/h/o controlled T2DM on Metformin 500mg bid. Also h/o CAD and COPD. Here with COPD exacerbation> remains on Prednisone 40mg daily. Pt w/hypoglycemic episode over the weekend likely due to decrease PO intake> dislikes hospital food so eating mainly protein and small amounts of carbs. Pt reports eating food from restaurant last night (Fillet mignon and potatoes) with BG >300s this am. Pt received  6 units of Lantus last night instead of 16 for BG POC of 93. Pt states she likes potatoes but noted that pt has a restricted potassium diet as well.  No hypoglycemia today. Per staff pt eating on and off without consistency. Also noted Prednisone is given at 5pm daily instead of am. Will start to move med to early time since pt has hypoglycemia prior to steroid administration. Goal is to have Prednisone with breakfast.      Review of Systems:  General: States she ate potatoes because she was tired of having rice every day.  GI: Tolerating POs without any N/V/D/ABD PAIN.  CV: No CP/SOB  ENDO:  no S&Sx of hypoglycemia in last 24 hours.      MEDS:  insulin glargine Injectable (LANTUS) 16 Unit(s) SubCutaneous at bedtime received 6 last night)  insulin lispro (HumaLOG) corrective regimen sliding scale   SubCutaneous three times a day before meals  insulin lispro (HumaLOG) corrective regimen sliding scale   SubCutaneous at bedtime  insulin lispro Injectable (HumaLOG) 8 Unit(s) SubCutaneous before lunch  insulin lispro Injectable (HumaLOG) 8 Unit(s) SubCutaneous before breakfast  insulin lispro Injectable (HumaLOG) 8 Unit(s) SubCutaneous with dinner  predniSONE   Tablet 40 milliGRAM(s) Oral daily  nystatin    Suspension 708236 Unit(s) Oral four times a day  cholecalciferol 2000 Unit(s) Oral daily  predniSONE   Tablet 40 milliGRAM(s) Oral daily  theophylline ER Capsule 400 milliGRAM(s) Oral daily    Allergies    No Known Allergies        PE:   General: Female lying in bed in NAD.   Vital Signs Last 24 Hrs  T(C): 36.8 (12-17-18 @ 06:11), Max: 36.8 (12-17-18 @ 06:11)  T(F): 98.2 (12-17-18 @ 06:11), Max: 98.2 (12-17-18 @ 06:11)  HR: 96 (12-17-18 @ 12:37) (85 - 99)  BP: 112/55 (12-17-18 @ 10:12) (104/67 - 134/75)  BP(mean): --  RR: 18 (12-17-18 @ 10:12) (18 - 20)  SpO2: 100% (12-17-18 @ 12:37) (97% - 100%)  Abd: Soft, NT, ND, Obese.   Extremities: Warm. b/l trace edema  Neuro: A&O X3    LABS:  POCT Blood Glucose.: 159 mg/dL (12-17-18 @ 13:16)  POCT Blood Glucose.: 319 mg/dL (12-17-18 @ 08:55)  POCT Blood Glucose.: 300 mg/dL (12-17-18 @ 08:54)  POCT Blood Glucose.: 93 mg/dL (12-16-18 @ 21:18)  POCT Blood Glucose.: 119 mg/dL (12-16-18 @ 17:45)  POCT Blood Glucose.: 74 mg/dL (12-16-18 @ 17:23)  POCT Blood Glucose.: 73 mg/dL (12-16-18 @ 17:01)  POCT Blood Glucose.: 88 mg/dL (12-16-18 @ 16:44)  POCT Blood Glucose.: 77 mg/dL (12-16-18 @ 16:29)  POCT Blood Glucose.: 65 mg/dL (12-16-18 @ 16:10)  POCT Blood Glucose.: 50 mg/dL (12-16-18 @ 15:55)  POCT Blood Glucose.: 52 mg/dL (12-16-18 @ 15:54)  POCT Blood Glucose.: 128 mg/dL (12-16-18 @ 13:17)  POCT Blood Glucose.: 194 mg/dL (12-16-18 @ 10:07)  POCT Blood Glucose.: 198 mg/dL (12-16-18 @ 08:33)  POCT Blood Glucose.: 242 mg/dL (12-15-18 @ 21:44)  POCT Blood Glucose.: 115 mg/dL (12-15-18 @ 17:08)                          12.9   10.74 )-----------( 132      ( 16 Dec 2018 11:40 )             38.9     12-17    127<L>  |  86<L>  |  35<H>  ----------------------------<  283<H>  4.6   |  31  |  1.07    Ca    8.8      17 Dec 2018 09:50  Mg     2.2     12-16       Hemoglobin A1C, Whole Blood: 7.2 % <H> [4.0 - 5.6] (11-30-18 @ 07:58)

## 2018-12-17 NOTE — PROGRESS NOTE ADULT - PROBLEM SELECTOR PLAN 5
thought to be 2/2 benicar and hyperglycemia ; benicar has been d/c   K 4.8 today  Continue Norvasc 5 mg daily.  Glycemic control as above.  Pt with metabolic alk 2/2 chronic resp acidosis   Nephrology is following resolved  monitor bmp daily  Glycemic control as above.  Nephrology is following

## 2018-12-17 NOTE — PROGRESS NOTE ADULT - PROBLEM SELECTOR PLAN 8
Pt with hx of right sided heart failure   c/w IV diuresis as tolerated  heart failure follow up   judicious use of IVF Pt with  grade I diastolic dysfunction  IV diuresis as tolerated  Heart failure team follow up requested by Dr Chapa  judicious use of IVF

## 2018-12-17 NOTE — PROGRESS NOTE ADULT - ASSESSMENT
59 y/o F w/h/o controlled T2DM on Metformin 500mg bid. Also h/o CAD and COPD. Here with COPD exacerbation now on Prednisone taper. Hypoglycemic episode yesterday with rebound hyperglycemia this am after eating large meal last night. Will adjust insulin doses again since pt is hardly eating carbs. Spoke to kitchen about pt's diet and they can offer pt pasta or small scoop of macaroni and cheese instead of  potatoes and rice. Pt was also encouraged to eat dairy or fruit if not getting carbs from starches. Also reinforced importance of holding meal time insulin if not eating carbs with meals. Pt verbalized understanding.  BG goal (100-200mg/dl) while on steroids. 57 y/o F w/h/o controlled T2DM on Metformin 500mg bid. Also h/o CAD and COPD. Here with COPD exacerbation now on Prednisone taper. Hypoglycemic episode yesterday with rebound hyperglycemia this am after eating large meal last night. Will adjust insulin doses again since pt is hardly eating carbs. Spoke to kitchen about pt's diet and they can offer pt pasta or small scoop of macaroni and cheese instead of  potatoes and rice. Pt was also encouraged to eat dairy or fruit if not getting carbs from starches. Also reinforced importance of holding meal time insulin if not eating carbs with meals. Pt verbalized understanding.  BG goal (100-200mg/dl) while on steroids. Scheduling Prednisone to an earlier administration time.

## 2018-12-17 NOTE — PROGRESS NOTE ADULT - SUBJECTIVE AND OBJECTIVE BOX
Patient is a 58y old  Female who presents with a chief complaint of dyspnea (16 Dec 2018 10:31)      SUBJECTIVE / OVERNIGHT EVENTS:    MEDICATIONS  (STANDING):  ALBUTerol/ipratropium for Nebulization 3 milliLiter(s) Nebulizer <User Schedule>  amLODIPine   Tablet 5 milliGRAM(s) Oral daily  artificial tears (preservative free) Ophthalmic Solution 1 Drop(s) Both EYES three times a day  aspirin enteric coated 81 milliGRAM(s) Oral daily  Biotene Dry Mouth Oral Rinse 5 milliLiter(s) Swish and Spit two times a day  buDESOnide   0.5 milliGRAM(s) Respule 0.5 milliGRAM(s) Inhalation every 12 hours  cholecalciferol 2000 Unit(s) Oral daily  dextrose 5%. 1000 milliLiter(s) (50 mL/Hr) IV Continuous <Continuous>  dextrose 50% Injectable 12.5 Gram(s) IV Push once  dextrose 50% Injectable 25 Gram(s) IV Push once  dextrose 50% Injectable 25 Gram(s) IV Push once  docusate sodium 100 milliGRAM(s) Oral daily  enoxaparin Injectable 40 milliGRAM(s) SubCutaneous every 24 hours  insulin glargine Injectable (LANTUS) 12 Unit(s) SubCutaneous at bedtime  insulin lispro (HumaLOG) corrective regimen sliding scale   SubCutaneous three times a day before meals  insulin lispro (HumaLOG) corrective regimen sliding scale   SubCutaneous at bedtime  insulin lispro Injectable (HumaLOG) 8 Unit(s) SubCutaneous before lunch  insulin lispro Injectable (HumaLOG) 8 Unit(s) SubCutaneous before breakfast  insulin lispro Injectable (HumaLOG) 8 Unit(s) SubCutaneous with dinner  isosorbide   mononitrate ER Tablet (IMDUR) 30 milliGRAM(s) Oral daily  melatonin 1 milliGRAM(s) Oral at bedtime  montelukast 10 milliGRAM(s) Oral daily  multivitamin 1 Tablet(s) Oral daily  nystatin    Suspension 999747 Unit(s) Oral four times a day  pantoprazole    Tablet 40 milliGRAM(s) Oral before breakfast  polyethylene glycol 3350 17 Gram(s) Oral daily  predniSONE   Tablet 40 milliGRAM(s) Oral daily  Roflumilast (Daliresp) 250 MICROGram(s) 250 MICROGram(s) Oral daily  senna 2 Tablet(s) Oral at bedtime  theophylline ER Capsule 400 milliGRAM(s) Oral daily    MEDICATIONS  (PRN):  bisacodyl Suppository 10 milliGRAM(s) Rectal daily PRN Constipation  dextrose 40% Gel 15 Gram(s) Oral once PRN Blood Glucose LESS THAN 70 milliGRAM(s)/deciliter  glucagon  Injectable 1 milliGRAM(s) IntraMuscular once PRN Glucose LESS THAN 70 milligrams/deciliter  ondansetron Injectable 4 milliGRAM(s) IV Push every 8 hours PRN Nausea and/or Vomiting  sodium chloride 0.65% Nasal 1 Spray(s) Both Nostrils two times a day PRN Nasal Congestion      Vital Signs Last 24 Hrs  T(C): 36.8 (17 Dec 2018 06:11), Max: 36.8 (17 Dec 2018 06:11)  T(F): 98.2 (17 Dec 2018 06:11), Max: 98.2 (17 Dec 2018 06:11)  HR: 99 (17 Dec 2018 10:12) (85 - 99)  BP: 112/55 (17 Dec 2018 10:12) (104/67 - 134/75)  BP(mean): --  RR: 18 (17 Dec 2018 10:12) (18 - 20)  SpO2: 99% (17 Dec 2018 10:12) (97% - 100%)  CAPILLARY BLOOD GLUCOSE      POCT Blood Glucose.: 319 mg/dL (17 Dec 2018 08:55)  POCT Blood Glucose.: 300 mg/dL (17 Dec 2018 08:54)  POCT Blood Glucose.: 93 mg/dL (16 Dec 2018 21:18)  POCT Blood Glucose.: 119 mg/dL (16 Dec 2018 17:45)  POCT Blood Glucose.: 74 mg/dL (16 Dec 2018 17:23)  POCT Blood Glucose.: 73 mg/dL (16 Dec 2018 17:01)  POCT Blood Glucose.: 88 mg/dL (16 Dec 2018 16:44)  POCT Blood Glucose.: 77 mg/dL (16 Dec 2018 16:29)  POCT Blood Glucose.: 65 mg/dL (16 Dec 2018 16:10)  POCT Blood Glucose.: 50 mg/dL (16 Dec 2018 15:55)  POCT Blood Glucose.: 52 mg/dL (16 Dec 2018 15:54)  POCT Blood Glucose.: 128 mg/dL (16 Dec 2018 13:17)    I&O's Summary    16 Dec 2018 07:01  -  17 Dec 2018 07:00  --------------------------------------------------------  IN: 1510 mL / OUT: 1400 mL / NET: 110 mL        PHYSICAL EXAM:  GENERAL: NAD, well-developed  HEAD:  Atraumatic, Normocephalic  EYES: EOMI, PERRLA, conjunctiva and sclera clear  NECK: Supple, No JVD  CHEST/LUNG: Clear to auscultation bilaterally; No wheeze  HEART: Regular rate and rhythm; No murmurs, rubs, or gallops  ABDOMEN: Soft, Nontender, Nondistended; Bowel sounds present  EXTREMITIES:  2+ Peripheral Pulses, No clubbing, cyanosis, or edema  PSYCH: AAOx3  NEUROLOGY: non-focal  SKIN: No rashes or lesions    LABS:                        12.9   10.74 )-----------( 132      ( 16 Dec 2018 11:40 )             38.9     12-17    127<L>  |  86<L>  |  35<H>  ----------------------------<  283<H>  4.6   |  31  |  1.07    Ca    8.8      17 Dec 2018 09:50  Phos  2.8     12-15  Mg     2.2     12-16                RADIOLOGY & ADDITIONAL TESTS:    Imaging Personally Reviewed:    Consultant(s) Notes Reviewed:      Care Discussed with Consultants/Other Providers: Patient is a 58y old  Female who presents with a chief complaint of dyspnea (16 Dec 2018 10:31)      SUBJECTIVE / OVERNIGHT EVENTS: Pt seen and examined. No acute events overnight. She reports no change in dyspnea. Also c/o nausea today.    MEDICATIONS  (STANDING):  ALBUTerol/ipratropium for Nebulization 3 milliLiter(s) Nebulizer <User Schedule>  amLODIPine   Tablet 5 milliGRAM(s) Oral daily  artificial tears (preservative free) Ophthalmic Solution 1 Drop(s) Both EYES three times a day  aspirin enteric coated 81 milliGRAM(s) Oral daily  Biotene Dry Mouth Oral Rinse 5 milliLiter(s) Swish and Spit two times a day  buDESOnide   0.5 milliGRAM(s) Respule 0.5 milliGRAM(s) Inhalation every 12 hours  cholecalciferol 2000 Unit(s) Oral daily  dextrose 5%. 1000 milliLiter(s) (50 mL/Hr) IV Continuous <Continuous>  dextrose 50% Injectable 12.5 Gram(s) IV Push once  dextrose 50% Injectable 25 Gram(s) IV Push once  dextrose 50% Injectable 25 Gram(s) IV Push once  docusate sodium 100 milliGRAM(s) Oral daily  enoxaparin Injectable 40 milliGRAM(s) SubCutaneous every 24 hours  insulin glargine Injectable (LANTUS) 12 Unit(s) SubCutaneous at bedtime  insulin lispro (HumaLOG) corrective regimen sliding scale   SubCutaneous three times a day before meals  insulin lispro (HumaLOG) corrective regimen sliding scale   SubCutaneous at bedtime  insulin lispro Injectable (HumaLOG) 8 Unit(s) SubCutaneous before lunch  insulin lispro Injectable (HumaLOG) 8 Unit(s) SubCutaneous before breakfast  insulin lispro Injectable (HumaLOG) 8 Unit(s) SubCutaneous with dinner  isosorbide   mononitrate ER Tablet (IMDUR) 30 milliGRAM(s) Oral daily  melatonin 1 milliGRAM(s) Oral at bedtime  montelukast 10 milliGRAM(s) Oral daily  multivitamin 1 Tablet(s) Oral daily  nystatin    Suspension 313692 Unit(s) Oral four times a day  pantoprazole    Tablet 40 milliGRAM(s) Oral before breakfast  polyethylene glycol 3350 17 Gram(s) Oral daily  predniSONE   Tablet 40 milliGRAM(s) Oral daily  Roflumilast (Daliresp) 250 MICROGram(s) 250 MICROGram(s) Oral daily  senna 2 Tablet(s) Oral at bedtime  theophylline ER Capsule 400 milliGRAM(s) Oral daily    MEDICATIONS  (PRN):  bisacodyl Suppository 10 milliGRAM(s) Rectal daily PRN Constipation  dextrose 40% Gel 15 Gram(s) Oral once PRN Blood Glucose LESS THAN 70 milliGRAM(s)/deciliter  glucagon  Injectable 1 milliGRAM(s) IntraMuscular once PRN Glucose LESS THAN 70 milligrams/deciliter  ondansetron Injectable 4 milliGRAM(s) IV Push every 8 hours PRN Nausea and/or Vomiting  sodium chloride 0.65% Nasal 1 Spray(s) Both Nostrils two times a day PRN Nasal Congestion      Vital Signs Last 24 Hrs  T(C): 36.8 (17 Dec 2018 06:11), Max: 36.8 (17 Dec 2018 06:11)  T(F): 98.2 (17 Dec 2018 06:11), Max: 98.2 (17 Dec 2018 06:11)  HR: 99 (17 Dec 2018 10:12) (85 - 99)  BP: 112/55 (17 Dec 2018 10:12) (104/67 - 134/75)  BP(mean): --  RR: 18 (17 Dec 2018 10:12) (18 - 20)  SpO2: 99% (17 Dec 2018 10:12) (97% - 100%)  CAPILLARY BLOOD GLUCOSE      POCT Blood Glucose.: 319 mg/dL (17 Dec 2018 08:55)  POCT Blood Glucose.: 300 mg/dL (17 Dec 2018 08:54)  POCT Blood Glucose.: 93 mg/dL (16 Dec 2018 21:18)  POCT Blood Glucose.: 119 mg/dL (16 Dec 2018 17:45)  POCT Blood Glucose.: 74 mg/dL (16 Dec 2018 17:23)  POCT Blood Glucose.: 73 mg/dL (16 Dec 2018 17:01)  POCT Blood Glucose.: 88 mg/dL (16 Dec 2018 16:44)  POCT Blood Glucose.: 77 mg/dL (16 Dec 2018 16:29)  POCT Blood Glucose.: 65 mg/dL (16 Dec 2018 16:10)  POCT Blood Glucose.: 50 mg/dL (16 Dec 2018 15:55)  POCT Blood Glucose.: 52 mg/dL (16 Dec 2018 15:54)  POCT Blood Glucose.: 128 mg/dL (16 Dec 2018 13:17)    I&O's Summary    16 Dec 2018 07:01  -  17 Dec 2018 07:00  --------------------------------------------------------  IN: 1510 mL / OUT: 1400 mL / NET: 110 mL        PHYSICAL EXAM:  GENERAL: NAD, anicteric, afebrile  HEAD:  Atraumatic, Normocephalic  EYES: EOMI, PERRLA, conjunctiva and sclera clear  NECK: Supple, No JVD  CHEST/LUNG: Clear to auscultation bilaterally; No wheeze  HEART: Regular rate and rhythm; No murmurs, rubs, or gallops  ABDOMEN: Soft, Nontender, Nondistended; Bowel sounds present  EXTREMITIES:  2+ Peripheral Pulses, b/l UE/LE edema  PSYCH: AAOx3  NEUROLOGY: non-focal  SKIN: No rashes or lesions    LABS:                        12.9   10.74 )-----------( 132      ( 16 Dec 2018 11:40 )             38.9     12-17    127<L>  |  86<L>  |  35<H>  ----------------------------<  283<H>  4.6   |  31  |  1.07    Ca    8.8      17 Dec 2018 09:50  Phos  2.8     12-15  Mg     2.2     12-16                  Consultant(s) Notes Reviewed:  Pulmonary Patient is a 58y old  Female who presents with a chief complaint of dyspnea (16 Dec 2018 10:31)      SUBJECTIVE / OVERNIGHT EVENTS: Pt seen and examined. No acute events overnight. She reports no change in dyspnea. Also c/o nausea today.  Pulmonary ( Dr Fair ) at bedside ; reccs appreciated.    MEDICATIONS  (STANDING):  ALBUTerol/ipratropium for Nebulization 3 milliLiter(s) Nebulizer <User Schedule>  amLODIPine   Tablet 5 milliGRAM(s) Oral daily  artificial tears (preservative free) Ophthalmic Solution 1 Drop(s) Both EYES three times a day  aspirin enteric coated 81 milliGRAM(s) Oral daily  Biotene Dry Mouth Oral Rinse 5 milliLiter(s) Swish and Spit two times a day  buDESOnide   0.5 milliGRAM(s) Respule 0.5 milliGRAM(s) Inhalation every 12 hours  cholecalciferol 2000 Unit(s) Oral daily  dextrose 5%. 1000 milliLiter(s) (50 mL/Hr) IV Continuous <Continuous>  dextrose 50% Injectable 12.5 Gram(s) IV Push once  dextrose 50% Injectable 25 Gram(s) IV Push once  dextrose 50% Injectable 25 Gram(s) IV Push once  docusate sodium 100 milliGRAM(s) Oral daily  enoxaparin Injectable 40 milliGRAM(s) SubCutaneous every 24 hours  insulin glargine Injectable (LANTUS) 12 Unit(s) SubCutaneous at bedtime  insulin lispro (HumaLOG) corrective regimen sliding scale   SubCutaneous three times a day before meals  insulin lispro (HumaLOG) corrective regimen sliding scale   SubCutaneous at bedtime  insulin lispro Injectable (HumaLOG) 8 Unit(s) SubCutaneous before lunch  insulin lispro Injectable (HumaLOG) 8 Unit(s) SubCutaneous before breakfast  insulin lispro Injectable (HumaLOG) 8 Unit(s) SubCutaneous with dinner  isosorbide   mononitrate ER Tablet (IMDUR) 30 milliGRAM(s) Oral daily  melatonin 1 milliGRAM(s) Oral at bedtime  montelukast 10 milliGRAM(s) Oral daily  multivitamin 1 Tablet(s) Oral daily  nystatin    Suspension 373120 Unit(s) Oral four times a day  pantoprazole    Tablet 40 milliGRAM(s) Oral before breakfast  polyethylene glycol 3350 17 Gram(s) Oral daily  predniSONE   Tablet 40 milliGRAM(s) Oral daily  Roflumilast (Daliresp) 250 MICROGram(s) 250 MICROGram(s) Oral daily  senna 2 Tablet(s) Oral at bedtime  theophylline ER Capsule 400 milliGRAM(s) Oral daily    MEDICATIONS  (PRN):  bisacodyl Suppository 10 milliGRAM(s) Rectal daily PRN Constipation  dextrose 40% Gel 15 Gram(s) Oral once PRN Blood Glucose LESS THAN 70 milliGRAM(s)/deciliter  glucagon  Injectable 1 milliGRAM(s) IntraMuscular once PRN Glucose LESS THAN 70 milligrams/deciliter  ondansetron Injectable 4 milliGRAM(s) IV Push every 8 hours PRN Nausea and/or Vomiting  sodium chloride 0.65% Nasal 1 Spray(s) Both Nostrils two times a day PRN Nasal Congestion      Vital Signs Last 24 Hrs  T(C): 36.8 (17 Dec 2018 06:11), Max: 36.8 (17 Dec 2018 06:11)  T(F): 98.2 (17 Dec 2018 06:11), Max: 98.2 (17 Dec 2018 06:11)  HR: 99 (17 Dec 2018 10:12) (85 - 99)  BP: 112/55 (17 Dec 2018 10:12) (104/67 - 134/75)  BP(mean): --  RR: 18 (17 Dec 2018 10:12) (18 - 20)  SpO2: 99% (17 Dec 2018 10:12) (97% - 100%)  CAPILLARY BLOOD GLUCOSE      POCT Blood Glucose.: 319 mg/dL (17 Dec 2018 08:55)  POCT Blood Glucose.: 300 mg/dL (17 Dec 2018 08:54)  POCT Blood Glucose.: 93 mg/dL (16 Dec 2018 21:18)  POCT Blood Glucose.: 119 mg/dL (16 Dec 2018 17:45)  POCT Blood Glucose.: 74 mg/dL (16 Dec 2018 17:23)  POCT Blood Glucose.: 73 mg/dL (16 Dec 2018 17:01)  POCT Blood Glucose.: 88 mg/dL (16 Dec 2018 16:44)  POCT Blood Glucose.: 77 mg/dL (16 Dec 2018 16:29)  POCT Blood Glucose.: 65 mg/dL (16 Dec 2018 16:10)  POCT Blood Glucose.: 50 mg/dL (16 Dec 2018 15:55)  POCT Blood Glucose.: 52 mg/dL (16 Dec 2018 15:54)  POCT Blood Glucose.: 128 mg/dL (16 Dec 2018 13:17)    I&O's Summary    16 Dec 2018 07:01  -  17 Dec 2018 07:00  --------------------------------------------------------  IN: 1510 mL / OUT: 1400 mL / NET: 110 mL        PHYSICAL EXAM:  GENERAL: NAD, anicteric, afebrile  HEAD:  Atraumatic, Normocephalic  EYES: EOMI, PERRLA, conjunctiva and sclera clear  NECK: Supple, No JVD  CHEST/LUNG: Clear to auscultation bilaterally; No wheeze  HEART: Regular rate and rhythm; No murmurs, rubs, or gallops  ABDOMEN: Soft, Nontender, Nondistended; Bowel sounds present  EXTREMITIES:  2+ Peripheral Pulses, b/l UE/LE edema  PSYCH: AAOx3  NEUROLOGY: non-focal  SKIN: No rashes or lesions        LABS:                        12.9   10.74 )-----------( 132      ( 16 Dec 2018 11:40 )             38.9     12-17    127<L>  |  86<L>  |  35<H>  ----------------------------<  283<H>  4.6   |  31  |  1.07    Ca    8.8      17 Dec 2018 09:50  Phos  2.8     12-15  Mg     2.2     12-16                  Consultant(s) Notes Reviewed:  Pulmonary    Case discussed with : Dr Fair ( Pulmonary), Dr Jason Chapa ( Cardiology)

## 2018-12-17 NOTE — PROGRESS NOTE ADULT - SUBJECTIVE AND OBJECTIVE BOX
[facility] - [facility]     PROGRESS NOTE    CHIEF COMPLAINT: chronic hypoxic/hypercapnic respiratory failure; COPD exacerbation; emphysema; dyspnea    INTERVAL HISTORY: resolved respiratory acidosis with frequent use of BIPAP although she is having difficulty tolerating the hospital masks; still remains quite short of breath with minimal exertion although appears more comfortable at rest; resolved AURORA; ongoing hyponatremia; no cough, sputum production, chest congestion or wheeze; no fevers, chills or sweats; no chest pain/pressure or palpitations; persistent extremity swelling upper > lower; sputum cultures without growth x 2; lower extremity weakness and pain -> statin discontinued - steroids decreased (with resulting hypoglycemia)    REVIEW OF SYSTEMS:  Constitutional: As per interval history  HEENT: Within normal limits  CV: As per interval history  Resp: As per interval history  GI: Within normal limits   : Within normal limits  Musculoskeletal: lower extremity weakness and pain  Skin: Within normal limits  Neurological: Within normal limits  Psychiatric: Within normal limits  Endocrine: Within normal limits  Hematologic/Lymphatic: Within normal limits  Allergic/Immunologic: Within normal limits    MEDICATIONS:     Pulmonary "  ALBUTerol/ipratropium for Nebulization 3 milliLiter(s) Nebulizer <User Schedule>  buDESOnide   0.5 milliGRAM(s) Respule 0.5 milliGRAM(s) Inhalation every 12 hours  montelukast 10 milliGRAM(s) Oral daily  theophylline ER Capsule 400 milliGRAM(s) Oral daily      Anti-microbials:  nystatin    Suspension 101389 Unit(s) Oral four times a day      Cardiovascular:  amLODIPine   Tablet 5 milliGRAM(s) Oral daily  isosorbide   mononitrate ER Tablet (IMDUR) 30 milliGRAM(s) Oral daily      Other:  artificial tears (preservative free) Ophthalmic Solution 1 Drop(s) Both EYES three times a day  aspirin enteric coated 81 milliGRAM(s) Oral daily  Biotene Dry Mouth Oral Rinse 5 milliLiter(s) Swish and Spit two times a day  bisacodyl Suppository 10 milliGRAM(s) Rectal daily PRN  cholecalciferol 2000 Unit(s) Oral daily  dextrose 40% Gel 15 Gram(s) Oral once PRN  dextrose 5%. 1000 milliLiter(s) IV Continuous <Continuous>  dextrose 50% Injectable 12.5 Gram(s) IV Push once  dextrose 50% Injectable 25 Gram(s) IV Push once  dextrose 50% Injectable 25 Gram(s) IV Push once  docusate sodium 100 milliGRAM(s) Oral daily  enoxaparin Injectable 40 milliGRAM(s) SubCutaneous every 24 hours  glucagon  Injectable 1 milliGRAM(s) IntraMuscular once PRN  insulin glargine Injectable (LANTUS) 10 Unit(s) SubCutaneous at bedtime  insulin lispro (HumaLOG) corrective regimen sliding scale   SubCutaneous three times a day before meals  insulin lispro (HumaLOG) corrective regimen sliding scale   SubCutaneous at bedtime  insulin lispro Injectable (HumaLOG) 6 Unit(s) SubCutaneous before lunch  insulin lispro Injectable (HumaLOG) 6 Unit(s) SubCutaneous with dinner  insulin lispro Injectable (HumaLOG) 6 Unit(s) SubCutaneous before breakfast  melatonin 1 milliGRAM(s) Oral at bedtime  multivitamin 1 Tablet(s) Oral daily  ondansetron Injectable 4 milliGRAM(s) IV Push every 8 hours PRN  pantoprazole    Tablet 40 milliGRAM(s) Oral before breakfast  polyethylene glycol 3350 17 Gram(s) Oral daily  predniSONE   Tablet 40 milliGRAM(s) Oral daily  Roflumilast (Daliresp) 250 MICROGram(s) 250 MICROGram(s) Oral daily  senna 2 Tablet(s) Oral at bedtime  sodium chloride 0.65% Nasal 1 Spray(s) Both Nostrils two times a day PRN      OBJECTIVE:        I&O's Detail    16 Dec 2018 07:  -  17 Dec 2018 07:00  --------------------------------------------------------  IN:    Oral Fluid: 1510 mL  Total IN: 1510 mL    OUT:    Voided: 1400 mL  Total OUT: 1400 mL    Total NET: 110 mL      17 Dec 2018 07:  -  17 Dec 2018 14:01  --------------------------------------------------------  IN:    Oral Fluid: 240 mL  Total IN: 240 mL    OUT:  Total OUT: 0 mL    Total NET: 240 mL    PHYSICAL EXAM:       ICU Vital Signs Last 24 Hrs  T(C): 36.8 (17 Dec 2018 06:11), Max: 36.8 (17 Dec 2018 06:11)  T(F): 98.2 (17 Dec 2018 06:11), Max: 98.2 (17 Dec 2018 06:11)  HR: 96 (17 Dec 2018 12:37) (85 - 99)  BP: 112/55 (17 Dec 2018 10:12) (111/71 - 134/75)  BP(mean): --  ABP: --  ABP(mean): --  RR: 18 (17 Dec 2018 10:12) (18 - 18)  SpO2: 100% (17 Dec 2018 12:37) (97% - 100%) on 4lpm nasal canula at rest     General: Awake. Alert. Cooperative. Mildly tachypneic and using accessory muscles of respiration. Appears stated age. Obese.   HEENT:  Atraumatic. Normocephalic. Anicteric. Normal oral mucosa. PERRL. EOMI.   Neck: Supple. Trachea midline. Thyroid without enlargement/tenderness/nodules. No carotid bruit. No JVD.	  Cardiovascular: Regular rate and rhythm. Distant S1 S2. No murmurs, rubs or gallops.  Respiratory: Respirations unlabored. Markedly decreased breath sounds throughout. Prolonged expiratory phase of respiration. No wheeze. No curvature.  Abdomen: Soft. Non-tender. Non-distended. No organomegaly. No masses. Normal bowel sounds. Obese.  Extremities: Warm to touch. No clubbing or cyanosis. Mild bilateral lower extremity edema up to the thigh. Moderate bilateral upper extremity swelling right > left  Pulses: Decreased lower extremity peripheral pulses.  Skin: No rashes or lesions. No ecchymoses. No cyanosis. Warm to touch.  Lymph Nodes: Cervical, supraclavicular and axillary nodes normal  Neurological: Motor and sensory examination equal and normal. A and O x 3  Psychiatry: Appropriate mood and affect.       LABS:                        12.9   10.74 )-----------( 132      ( 16 Dec 2018 11:40 )             38.9     12-    127<L>  |  86<L>  |  35<H>  ----------------------------<  283<H>  4.6   |  31  |  1.07    12-16    129<L>  |  86<L>  |  40<H>  ----------------------------<  175<H>  4.8   |  33<H>  |  0.90    Ca      8.8      12-17    Ca      8.8      12-16    Phos    2.8     12-15    Phos    3.7     12-14      Mg       2.2     12-16    Mg       2.2     12-15    ABG - ( 16 Dec 2018 20:53 )  pH: 7.44  /  pCO2: 53    /  pO2: 173   / HCO3: 36    / Base Excess: 10.0  /  SaO2: 100       ABG - ( 13 Dec 2018 06:29 )  pH: 7.30  /  pCO2: 71    /  pO2: 182   / HCO3: 34    / Base Excess: 5.8   /  SaO2: 99        ABG - ( 13 Dec 2018 00:59 )  pH: 7.32  /  pCO2: 71    /  pO2: 75    / HCO3: 35    / Base Excess: 7.1   /  SaO2: 95        ABG - ( 11 Dec 2018 11:45 )  pH: 7.39  /  pCO2: 54    /  pO2: 98    / HCO3: 32    / Base Excess: 6.2   /  SaO2: 98        ABG - ( 09 Dec 2018 11:26 )  pH: 7.35  /  pCO2: 63    /  pO2: 145   / HCO3: 34    / Base Excess: 6.6   /  SaO2: 99      ABG - ( 09 Dec 2018 00:28 )  pH: 7.32  /  pCO2: 71    /  pO2: 124   / HCO3: 36    / Base Excess: 7.6   /  SaO2: 99        ABG - ( 08 Dec 2018 20:50 )  pH: 7.32  /  pCO2: 72    /  pO2: 80    / HCO3: 36    / Base Excess: 7.7   /  SaO2: 95        Serum Pro-Brain Natriuretic Peptide: 254 pg/mL ( @ 14:00)    < from: Transthoracic Echocardiogram (18 @ 08:59) >    Patient name: GUY HARTMAN  YOB: 1960   Age: 58 (F)   MR#: [id number]  Study Date: 2018  Location: [address] [place], [place] [id number]: [facility]  Study quality: Technically good  Referring Physician: Allen ingram MD  BloodPressure: 120/80 mmHg  Height: 163 cm  Weight: 88 kg  BSA: 1.9 m2  ------------------------------------------------------------------------  PROCEDURE: Transthoracic echocardiogram with 2-D, M-Mode  and complete spectral and color flow Doppler.  INDICATION: Other forms of dyspnea (R06.09)  ------------------------------------------------------------------------  Dimensions:    Normal Values:  LA:     3.6    2.0 - 4.0 cm  Ao:     2.9    2.0 - 3.8 cm  SEPTUM: 0.9    0.6 - 1.2 cm  PWT:    0.8    0.6- 1.1 cm  LVIDd:  4.9    3.0 - 5.6 cm  LVIDs:  2.9    1.8 - 4.0 cm  Derived variables:  LVMI: 73 g/m2  RWT: 0.32  Fractional short: 40 %  EF (Teicholtz): 71 %  Doppler Peak Velocity (m/sec): AoV=1.2  ------------------------------------------------------------------------  Observations:  Mitral Valve: Normal mitral valve.  Aortic Valve/Aorta: Normal trileaflet aortic valve. Peak  transaortic valve gradient equals 6 mm Hg, mean transaortic  valve gradient equals 3 mm Hg, aortic valve velocity time  integral equals 22 cm. Trace aortic regurgitation.  Aortic Root: 2.9 cm.  Left Atrium: Normal left atrium.  LA volume index = 18  cc/m2.  Left Ventricle: Normal left ventricular systolic function.  No segmental wall motion abnormalities. Normal left  ventricular internal dimensions and wall thicknesses. Mild  diastolic dysfunction (Stage I).  Right Heart: Normal right atrium. Normal right ventricular  size and function. Normal tricuspid valve. Minimal  tricuspid regurgitation. Normal pulmonic valve.  Pericardium/Pleura: Normal pericardium with no pericardial  effusion.  Hemodynamic: Estimated right atrial pressure is 8 mm Hg.  Inadequate tricuspid regurgitation Doppler envelope  precludes estimation of RVSP.  ------------------------------------------------------------------------  Conclusions:  1. Normal mitral valve.  2. Normal trileaflet aortic valve. Peak transaortic valve  gradient equals 6 mm Hg, mean transaortic valve gradient  equals 3 mm Hg, aortic valve velocity time integral equals  22 cm. Trace aortic regurgitation.  3. Aortic Root: 2.9 cm.  4. Normal left atrium.  LA volume index = 18 cc/m2.  5. Normal left ventricular internal dimensions and wall  thicknesses.  6. Normal left ventricular systolic function. No segmental  wall motion abnormalities.  7. Mild diastolic dysfunction (Stage I).  8. Normal right ventricular size and function.  9. Inadequate tricuspid regurgitation Doppler envelope  precludes estimation of RVSP.  10. Normal tricuspid valve. Minimal tricuspid  regurgitation.  *** Compared with echocardiogram report of 2014, no  significant changes noted.  ------------------------------------------------------------------------  Confirmed on  2018 - 13:49:43 by Shefali Gómez M.D.  ------------------------------------------------------------------------    < end of copied text >  ---------------------------------------------------------------------------------------------------------------    MICROBIOLOGY:     Urinalysis Basic - ( 13 Dec 2018 16:09 )    Color: Yellow / Appearance: Clear / S.022 / pH: x  Gluc: x / Ketone: Negative  / Bili: Negative / Urobili: Negative mg/dL   Blood: x / Protein: Negative mg/dL / Nitrite: Negative   Leuk Esterase: Negative / RBC: x / WBC x   Sq Epi: x / Non Sq Epi: x / Bacteria: x    Culture - Sputum . (18 @ 08:34)    Gram Stain:   Rare polymorphonuclear leukocytes per low power field  Rare Squamous epithelial cells per low power field  Numerous Gram Positive Cocci in Pairs and Chains per oil power field    Specimen Source: .Sputum Sputum    Culture Results:   Normal Respiratory Samaria present    Culture - Sputum . (18 @ 22:50)    Gram Stain:   Moderate polymorphonuclear leukocytes per low power field  Few Squamous epithelial cells per low power field  Moderate Gram Positive Cocci in Pairs and Chains per oil power field    Specimen Source: .Sputum Sputum    Culture Results:   Normal Respiratory Samaria present    Rapid Respiratory Viral Panel (18 @ 01:30)    Rapid RVP Result: NotDetec: The Film[employer] RVP Rapid uses polymerase chain reaction (PCR) and melt  curve analysis to screen for adenovirus; coronavirus HKU1, NL63, 229E,  OC43; human metapneumovirus (hMPV); human enterovirus/rhinovirus  (Entero/RV); influenza A; influenza A/H1;influenza A/H3; influenza  A/H1-2009; influenza B; parainfluenza viruses 1, 2, 3, 4; respiratory  syncytial virus; Bordetella pertussis; Mycoplasma pneumoniae; and  Chlamydophila pneumoniae.    RADIOLOGY:  [x] Chest radiographs reviewed and interpreted by me    < from: Xray Chest 1 View- PORTABLE-Routine (18 @ 13:10) >    EXAM:  XR CHEST PORTABLE ROUTINE 1V                          PROCEDURE DATE:  2018      INTERPRETATION:  HISTORY: Shortness of breath    TECHNIQUE: Portable frontal chest x-ray    COMPARISON: Chest x-ray from 2018    FINDINGS:    No focal consolidation.  There is no pneumothorax. There are no pleural effusions.   The cardiomediastinal silhouette cannot be adequately assessed on this   projection.    IMPRESSION:   Clear lungs.       JEANIE SPEARS M.D., RADIOLOGY RESIDENT  This document has been electronically signed.  JEYSON SANCHEZ M.D., ATTENDING RADIOLOGIST  This document has been electronically signed. Dec 13 2018  3:28PM      < end of copied text >  ---------------------------------------------------------------------------------------------------------------  < from: CT Angio Chest w/ IV Cont (18 @ 16:30) >    EXAM:  CT ANGIO CHEST (W)AW IC                          PROCEDURE DATE:  2018      INTERPRETATION:  CT CHEST WITH CONTRAST    INDICATION: Known COPD not responding to steroids and bronchodilators.   Progressively worsening dyspnea. Evaluate for pulmonary embolism.    TECHNIQUE: Enhanced helical images were obtained of the chest. Coronal   and sagittal images were reconstructed. Maximum intensity projection   images were generated. Images were obtained after the uneventful   administration of 60 cc nonionic intravenous Omnipaque 350.  40 cc of   Omnipaque 350 was discarded.    COMPARISON: CT chest 2014.    FINDINGS:     Lungs And Airways: Emphysematous changes of the lung.      The airways are unremarkable.      Pleura: No pneumothorax. No pleural effusion.    Mediastinum: There are no enlarged chest lymph nodes. The visualized   portion of the thyroid gland is unremarkable.       Heart and Vasculature: No pulmonary embolism.    The main pulmonary artery is normal in caliber. No thoracic aortic   aneurysm or dissection. Atheromatous disease of the aorta.    The heart is normal in size.  There is no pericardial effusion.   Coronary artery disease with calcified plaque involving the right and   left main coronary arteries and the left anterior descending artery.      Upper Abdomen: The upper abdomen is unremarkable.    Bones And Soft Tissues: Degenerative changes of the spine.  The soft   tissues are unremarkable.      IMPRESSION:   1.  No pulmonary embolism.  2. Emphysematous changes of the lung.    DIANA MEDINA M.D., RADIOLOGY RESIDENT  This document has been electronically signed.  JEYSON SANCHEZ M.D., ATTENDING RADIOLOGIST  This document has been electronically signed. Dec  4 2018  5:21PM      < end of copied text >  ---------------------------------------------------------------------------------------------------------------  < from: Xray Chest 1 View AP/PA (18 @ 14:53) >    EXAM:  XR CHEST AP OR PA 1V                          PROCEDURE DATE:  2018      INTERPRETATION:  A single chest x-ray was obtained on 2018.    Indication: Shortness of breath.    Impression:    The heart is normal in size. The lungs are clear. The visualized bones   are normal.    ALEX CUELLAR M.D., ATTENDING RADIOLOGIST  This document has been electronically signed. 2018  2:59PM    < end of copied text >  ---------------------------------------------------------------------------------------------------------------  < from: VA Duplex Lower Ext Vein Scan, Darius (18 @ 15:40) >    EXAM:  DUPLEX SCAN EXT VEINS LOWER BI                          PROCEDURE DATE:  2018      INTERPRETATION:  CLINICAL INFORMATION: Shortness of breath, leg pain and   swelling.    COMPARISON: Bilateral lower extremity venous duplex study dated 2009.    TECHNIQUE: Duplex sonography of the BILATERAL LOWER extremities with   color and spectral Doppler, with and without compression.      FINDINGS:    There is normal compressibility of the bilateral common femoral, femoral   and popliteal veins. No calf vein thrombosis is detected.    Doppler examination shows normal spontaneous and phasic flow.    IMPRESSION:     No evidence of bilateral lower extremity deep venous thrombosis.    JUSTIN ZAVALETA M.D., ATTENDING RADIOLOGIST  This document has been electronically signed. Dec  6 2018  4:03PM    < end of copied text >  ---------------------------------------------------------------------------------------------------------------    < from: VA Duplex Upper Ext Vein Scan, Darius (12..18 @ 17:36) >    EXAM:  DUPLEX SCAN EXT VEINS UPPER BI                          PROCEDURE DATE:  2018      IMPRESSION:     No evidence of BILATERAL upper extremity deep venous thrombosis.    SNEHA KELSEY M.D., RADIOLOGY RESIDENT  This document has been electronically signed.  RAYMUNDO DUMONT M.D., ATTENDING RADIOLOGIST  This document has been electronically signed. Dec 13 2018  9:22AM      < end of copied text >  ---------------------------------------------------------------------------------------------------------------  SPIROMETRY:     FEV1 0.56 liters - 22% predicted  FVC 1.73 liters - 52% predicted  FEV1% 32    c/w very severe obstructive lung disease

## 2018-12-17 NOTE — PROGRESS NOTE ADULT - PROBLEM SELECTOR PLAN 4
downtrending cr 0.99 today  Nephrology following  pt with b/l UE swelling ; may benefit from a dose of IV Lasix 40mg today ; will d/w Nephrology resolved  Nephrology following  pt with b/l UE swelling ; tone 2/2 steroids

## 2018-12-17 NOTE — PROGRESS NOTE ADULT - ASSESSMENT
ASSESSMENT:    ongoing dyspnea with minimal exertion with chronic hypoxic and hypercapnic respiratory failure due to very severe COPD with "exacerbation" - adequate oxygenation on a nasal canula in the setting of profound dyspnea suggests that alterations in the mechanics of breathing due to lung hyperinflation are playing a significant role in shortness of breath - there is evidence of CAD without pulmonary hypertension on the CT scan which is without pulmonary emboli or pneumonia - ECHO has a preserved LVEF, no significant valvular heart disease and no pulmonary hypertension - resolved AURORA and hyperkalemia - persistent hyponatremia - improved respiratory acidosis with BIPAP support - lower extremity weakness and pain 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 perhaps exacerbated by statin use    HTN/HLD/DM    CAD s/p PCI    PAD    extremity swelling likely related to steroid induced fluid retention and nocturnal hypoxemia - no evidence of DVT on upper or lower extremity Duplex examination    PLAN/RECOMMENDATIONS:    BIPAP 12/5 with 40% FiO2 for sleep - on and off during the day for increased work of breathing or recurrent respiratory acidosis - attempting to get a nasal pillow interface from [facility]  oxygen supplementation to keep saturation greater than 92% using a nasal canula when off BIPAP  following ABG  sputum culture - no growth - has completed a course of biaxin  home trilogy vent has been arranged with [facility]   albuterol/atrovent nebs q4h holding 2AM dose  pulmicort 0.5mg nebs q12h  singulair/theophylline/daliresp - check theophylline level - would clarify if theophylline and roflumilast can be used together  prednisone 40mg daily - glucose control - nystatin  cardiac meds: ASA/imdur/norvasc - discontinue crestor with possible myopathy  diurese as tolerated by renal function and hemodynamics - watch for worsening metabolic alkalosis with loop diuretic use which could lead to worsening hypercapnia  cardiology evaluation noted - ECHO with preserved LVEF, no evidence of valvular heart disease and no evidence of right ventricular dysfunction despite chronic hypoxemia - confirming with them that adding azithromycin for its anti-inflammatory effects is not contraindicated by her history of CAD  DVT prophylaxis - SQ lovenox  physical therapy as able  renal and endocrine evaluation noted  bowel regimen  outpatient pulmonary rehabilitation  outpatient evaluation for lung transplantation    Will follow with you. Plan of care discussed with the patient at bedside and the medical team. Consult and progress notes have been sent to [facility] lung transplant team.    David Fair MD, [facility] - [phone]  Pulmonary Medicine

## 2018-12-17 NOTE — PROGRESS NOTE ADULT - PROBLEM SELECTOR PLAN 1
Prednisone 40mg qd   CTA showed no PE, no PNA.  Continue Pulmicort, Duoneb ATC (with transition back to spiriva upon discharge), Theophylline, and Singulair.  Completed 7 days of biaxin   Echo report noted, mild diastolic dysfunction, no significant changes from prior study in 2014.  Home Trilogy vent arranged by pulmonary.  minimal improvement clinically  c/w bipap   pulmonary reccs noted  ; will start Roflumilast 250mcg qd  HCO3 was 38 yesterday ; will give Diamox 250mg IVP today x 1  check ABG thereafter  Pulm follow up with [place]/transplant list Prednisone 40mg qd   CTA showed no PE, no PNA.  Continue Pulmicort, Duoneb ATC (with transition back to spiriva upon discharge), Theophylline, and Singulair.  Completed 7 days of biaxin   Echo report noted, mild diastolic dysfunction, no significant changes from prior study in 2014.  Home Trilogy vent arranged by pulmonary.  minimal improvement clinically  c/w bipap   started on Roflumilast 250mcg qd yesterday per Pulmonary ( Dr Leonides escobar)  HCO3 36 yesterday ; PH 7.4  Pulm follow up with [place]/transplant list  Dr Leonides escobar Azithromycin 3x a week but wants Cardiology clearance prior to initiating; d/w pt's primary Cardiologist Dr Jason Chapa ; Dr Brunson is covering in [facility] and will see the pt ; pt is in agreement

## 2018-12-18 LAB
ANION GAP SERPL CALC-SCNC: 13 MMOL/L — SIGNIFICANT CHANGE UP (ref 5–17)
BASOPHILS # BLD AUTO: 0 K/UL — SIGNIFICANT CHANGE UP (ref 0–0.2)
BASOPHILS NFR BLD AUTO: 0 % — SIGNIFICANT CHANGE UP (ref 0–2)
BUN SERPL-MCNC: 38 MG/DL — HIGH (ref 7–23)
CALCIUM SERPL-MCNC: 8.9 MG/DL — SIGNIFICANT CHANGE UP (ref 8.4–10.5)
CHLORIDE SERPL-SCNC: 87 MMOL/L — LOW (ref 96–108)
CO2 SERPL-SCNC: 24 MMOL/L — SIGNIFICANT CHANGE UP (ref 22–31)
CREAT SERPL-MCNC: 1.1 MG/DL — SIGNIFICANT CHANGE UP (ref 0.5–1.3)
EOSINOPHIL # BLD AUTO: 0.1 K/UL — SIGNIFICANT CHANGE UP (ref 0–0.5)
EOSINOPHIL NFR BLD AUTO: 0.8 % — SIGNIFICANT CHANGE UP (ref 0–6)
GAS PNL BLDA: SIGNIFICANT CHANGE UP
GLUCOSE BLDC GLUCOMTR-MCNC: 127 MG/DL — HIGH (ref 70–99)
GLUCOSE BLDC GLUCOMTR-MCNC: 152 MG/DL — HIGH (ref 70–99)
GLUCOSE BLDC GLUCOMTR-MCNC: 153 MG/DL — HIGH (ref 70–99)
GLUCOSE BLDC GLUCOMTR-MCNC: 230 MG/DL — HIGH (ref 70–99)
GLUCOSE BLDC GLUCOMTR-MCNC: 237 MG/DL — HIGH (ref 70–99)
GLUCOSE BLDC GLUCOMTR-MCNC: 340 MG/DL — HIGH (ref 70–99)
GLUCOSE SERPL-MCNC: 166 MG/DL — HIGH (ref 70–99)
HCT VFR BLD CALC: 38.3 % — SIGNIFICANT CHANGE UP (ref 34.5–45)
HGB BLD-MCNC: 13.1 G/DL — SIGNIFICANT CHANGE UP (ref 11.5–15.5)
LYMPHOCYTES # BLD AUTO: 0.4 K/UL — LOW (ref 1–3.3)
LYMPHOCYTES # BLD AUTO: 3.2 % — LOW (ref 13–44)
MCHC RBC-ENTMCNC: 29.3 PG — SIGNIFICANT CHANGE UP (ref 27–34)
MCHC RBC-ENTMCNC: 34.2 GM/DL — SIGNIFICANT CHANGE UP (ref 32–36)
MCV RBC AUTO: 85.6 FL — SIGNIFICANT CHANGE UP (ref 80–100)
MONOCYTES # BLD AUTO: 0.4 K/UL — SIGNIFICANT CHANGE UP (ref 0–0.9)
MONOCYTES NFR BLD AUTO: 3.5 % — SIGNIFICANT CHANGE UP (ref 2–14)
NEUTROPHILS # BLD AUTO: 11.5 K/UL — HIGH (ref 1.8–7.4)
NEUTROPHILS NFR BLD AUTO: 92.6 % — HIGH (ref 43–77)
PLATELET # BLD AUTO: 129 K/UL — LOW (ref 150–400)
POTASSIUM SERPL-MCNC: 4.5 MMOL/L — SIGNIFICANT CHANGE UP (ref 3.5–5.3)
POTASSIUM SERPL-SCNC: 4.5 MMOL/L — SIGNIFICANT CHANGE UP (ref 3.5–5.3)
RBC # BLD: 4.47 M/UL — SIGNIFICANT CHANGE UP (ref 3.8–5.2)
RBC # FLD: 13.6 % — SIGNIFICANT CHANGE UP (ref 10.3–14.5)
SODIUM SERPL-SCNC: 124 MMOL/L — LOW (ref 135–145)
WBC # BLD: 12.5 K/UL — HIGH (ref 3.8–10.5)
WBC # FLD AUTO: 12.5 K/UL — HIGH (ref 3.8–10.5)

## 2018-12-18 PROCEDURE — 99233 SBSQ HOSP IP/OBS HIGH 50: CPT

## 2018-12-18 PROCEDURE — 99232 SBSQ HOSP IP/OBS MODERATE 35: CPT

## 2018-12-18 RX ORDER — INSULIN LISPRO 100/ML
VIAL (ML) SUBCUTANEOUS AT BEDTIME
Qty: 0 | Refills: 0 | Status: DISCONTINUED | OUTPATIENT
Start: 2018-12-18 | End: 2019-01-15

## 2018-12-18 RX ORDER — INSULIN LISPRO 100/ML
4 VIAL (ML) SUBCUTANEOUS
Qty: 0 | Refills: 0 | Status: DISCONTINUED | OUTPATIENT
Start: 2018-12-18 | End: 2018-12-19

## 2018-12-18 RX ORDER — FUROSEMIDE 40 MG
20 TABLET ORAL DAILY
Qty: 0 | Refills: 0 | Status: DISCONTINUED | OUTPATIENT
Start: 2018-12-18 | End: 2018-12-27

## 2018-12-18 RX ORDER — INSULIN LISPRO 100/ML
VIAL (ML) SUBCUTANEOUS
Qty: 0 | Refills: 0 | Status: DISCONTINUED | OUTPATIENT
Start: 2018-12-18 | End: 2019-01-15

## 2018-12-18 RX ADMIN — Medication 100 MILLIGRAM(S): at 12:43

## 2018-12-18 RX ADMIN — Medication 2: at 09:54

## 2018-12-18 RX ADMIN — Medication 1: at 19:00

## 2018-12-18 RX ADMIN — Medication 6 UNIT(S): at 09:53

## 2018-12-18 RX ADMIN — Medication 1 MILLIGRAM(S): at 22:14

## 2018-12-18 RX ADMIN — ENOXAPARIN SODIUM 40 MILLIGRAM(S): 100 INJECTION SUBCUTANEOUS at 22:16

## 2018-12-18 RX ADMIN — Medication 3 MILLILITER(S): at 06:36

## 2018-12-18 RX ADMIN — Medication 3 MILLILITER(S): at 22:14

## 2018-12-18 RX ADMIN — Medication 1 DROP(S): at 22:14

## 2018-12-18 RX ADMIN — Medication 4 UNIT(S): at 15:29

## 2018-12-18 RX ADMIN — Medication 400 MILLIGRAM(S): at 09:10

## 2018-12-18 RX ADMIN — INSULIN GLARGINE 10 UNIT(S): 100 INJECTION, SOLUTION SUBCUTANEOUS at 22:14

## 2018-12-18 RX ADMIN — Medication 3 MILLILITER(S): at 14:18

## 2018-12-18 RX ADMIN — MONTELUKAST 10 MILLIGRAM(S): 4 TABLET, CHEWABLE ORAL at 12:41

## 2018-12-18 RX ADMIN — PANTOPRAZOLE SODIUM 40 MILLIGRAM(S): 20 TABLET, DELAYED RELEASE ORAL at 06:37

## 2018-12-18 RX ADMIN — ISOSORBIDE MONONITRATE 30 MILLIGRAM(S): 60 TABLET, EXTENDED RELEASE ORAL at 12:41

## 2018-12-18 RX ADMIN — Medication 4 UNIT(S): at 19:00

## 2018-12-18 RX ADMIN — ONDANSETRON 4 MILLIGRAM(S): 8 TABLET, FILM COATED ORAL at 12:38

## 2018-12-18 RX ADMIN — Medication 2000 UNIT(S): at 12:43

## 2018-12-18 RX ADMIN — Medication 40 MILLIGRAM(S): at 09:18

## 2018-12-18 RX ADMIN — Medication 0.5 MILLIGRAM(S): at 06:36

## 2018-12-18 RX ADMIN — Medication 81 MILLIGRAM(S): at 14:19

## 2018-12-18 RX ADMIN — Medication 3 MILLILITER(S): at 18:41

## 2018-12-18 RX ADMIN — Medication 3 MILLILITER(S): at 10:05

## 2018-12-18 RX ADMIN — Medication 1 TABLET(S): at 12:42

## 2018-12-18 RX ADMIN — SENNA PLUS 2 TABLET(S): 8.6 TABLET ORAL at 22:14

## 2018-12-18 RX ADMIN — POLYETHYLENE GLYCOL 3350 17 GRAM(S): 17 POWDER, FOR SOLUTION ORAL at 14:18

## 2018-12-18 RX ADMIN — Medication 20 MILLIGRAM(S): at 14:14

## 2018-12-18 RX ADMIN — Medication 0.5 MILLIGRAM(S): at 18:40

## 2018-12-18 RX ADMIN — Medication 1 DROP(S): at 06:37

## 2018-12-18 RX ADMIN — AMLODIPINE BESYLATE 5 MILLIGRAM(S): 2.5 TABLET ORAL at 06:37

## 2018-12-18 NOTE — CONSULT NOTE ADULT - ATTENDING COMMENTS
Patient seen and examined, agree with the above assessment and plan by AMINAH Whatley.  58 F PMH advanced COPD on home O2 3L, HTN, HLD, CAD s/p x6 stents, T2DM, pHTN, PVD, p/w progressive worsening dyspnea x 2 weeks.   pt reports no improvement in her symptoms  Exam reveals decreased BS at the bases and pitting edema  Recommend low dose IV lasix  Pulm F/U

## 2018-12-18 NOTE — PROGRESS NOTE ADULT - PROBLEM SELECTOR PLAN 3
A1C 7.2, but with recent hypoglycemic episodes   - Endocrine reccs appreciated  -c/w Lantus 6 units QHS  -c/w Humalog 8 units TID w/meals (hold if not eating)  -c/w Humalog moderate correction scale AC and Mod HS scale  Continue to monitor blood sugars.

## 2018-12-18 NOTE — PROGRESS NOTE ADULT - ASSESSMENT
59 y/o F w/h/o controlled T2DM on Metformin 500mg bid. Also h/o CAD and COPD. Here with COPD exacerbation now on Prednisone taper. Hypoglycemic episode yesterday again due to poor PO intake and getting Prednisone at in the evening instead than early in the day. Had rebound hyperglycemia last night and this am> received lower Lantus dose last night as well. Spoke to RD to see pt and assess food preferences to improve PO intake. Also to evaluate potassium restricted diet with pt and team. Spoke to primary team NP about changing Prednisone dose for am instead of late evening. Pt received Prednisone this am and timing was changed to morning starting today. Pt also made aware of the need to eat some carbs if getting premeal insulin. She verbalized understanding but states she won't eat if she dislikes the food. Spoke to RN to give Humalog after meals to make sure pt eats before administering insulin. Lower premeal doses due to poor PO intake and hypoglycemic episodes.

## 2018-12-18 NOTE — CONSULT NOTE ADULT - ASSESSMENT
58 F PMH COPD on home O2 3L, HTN, HLD, CAD s/p x6 stents, T2DM, pHTN, PVD, p/w progressive worsening dyspnea x 2 weeks now complicated by chronic progressive hypoxic respiratory failure.     1. Chronic progressive hypoxic respiratory failure  multifactorial in the setting of worsening COPD, CAD, chronic diastolic CHF, pulmonary HTN   pt. appears mildly fluid overloaded on exam with generalized edema   d/c norvasc, trial lasix 20mg IVP daily   echo with normal LV function, mild diastolic dysfunction, inadequate tricuspid regurg   cv stable no chest pain, no evidence of acute ischemia/ACS   remains on bipap  recommend repeat CXR to eval for pleural effusions   continue dueonebs, inhaled steroids, singulair/theophylline/daliresp, pulm f/u     2. CAD s/p PCI  cv stable  continue asa, imdur  d/c  norvasc to allow bp room for diuresis     3. COPD   remains on bipap  continue Duoneb inhaled steroids, singulair/theophylline/daliresp, pulm f/u     dvt ppx Patients  Summer calling to make new patient appt for patient.  She stated that the previous appt for new patient was canceled per the physican and is wondering if there is a way we can get her in sooner than may of 2019.  Summer stated there was an error in communication about the cancelled appt for 8/28/18.   summer would like a call back at [phone] to discuss.

## 2018-12-18 NOTE — PROGRESS NOTE ADULT - ASSESSMENT
ASSESSMENT:    ongoing dyspnea with minimal exertion with chronic hypoxic and hypercapnic respiratory failure due to very severe COPD with "exacerbation" - adequate oxygenation on a nasal canula in the setting of profound dyspnea suggests that alterations in the mechanics of breathing due to lung hyperinflation are playing a significant role in shortness of breath - there is evidence of CAD without pulmonary hypertension on the CT scan which is without pulmonary emboli or pneumonia - ECHO has a preserved LVEF, no significant valvular heart disease and no pulmonary hypertension - resolved AURORA and hyperkalemia - persistent hyponatremia - resolved respiratory acidosis with BIPAP support - lower extremity weakness and pain 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 perhaps exacerbated by statin use    HTN/HLD/DM    CAD s/p PCI    PAD    extremity swelling likely related to steroid induced fluid retention and nocturnal hypoxemia with worsening of elevated pulmonary artery pressures - no evidence of DVT on upper or lower extremity Duplex examination    PLAN/RECOMMENDATIONS:    BIPAP 12/5 with 40% FiO2 for sleep - on and off during the day for increased work of breathing or recurrent respiratory acidosis - attempting to get a nasal pillow interface from [facility] [employer] [facility]  oxygen supplementation to keep saturation greater than 92% using a nasal canula when off BIPAP  following ABG  sputum culture - no growth - has completed a course of biaxin  home trilogy vent has been arranged with [facility]   albuterol/atrovent nebs q4h holding 2AM dose  pulmicort 0.5mg nebs q12h  singulair/theophylline/daliresp - check theophylline level - would clarify if theophylline and roflumilast can be used together - pharmacist called  prednisone 40mg daily - glucose control - nystatin  cardiac meds: ASA/imdur - discontinue crestor with possible myopathy - discontinue norvasc - lasix  diurese as tolerated by renal function and hemodynamics - watch for worsening metabolic alkalosis with loop diuretic use which could lead to worsening hypercapnia  cardiology evaluation noted - ECHO with preserved LVEF, no evidence of valvular heart disease and no evidence of right ventricular dysfunction despite chronic hypoxemia - confirming with them that adding azithromycin for its anti-inflammatory effects is not contraindicated by her history of CAD  DVT prophylaxis - SQ lovenox  physical therapy as able  renal and endocrine evaluation noted  bowel regimen  outpatient pulmonary rehabilitation  outpatient evaluation for lung transplantation    Will follow with you. Plan of care discussed with the patient at bedside and the dedicated floor NP. Consult and progress [facility].    David Fair MD, FCCP - [phone]  Pulmonary Medicine

## 2018-12-18 NOTE — CHART NOTE - NSCHARTNOTEFT_GEN_A_CORE
Nutrition Follow Up Note  Patient seen for nutrition follow-up assessment. Per chart, pt is a 59 y/o female with ongoing dyspnea with minimal exertion with chronic hypoxic and hypercapnic respiratory failure due to very severe COPD. AURORA and hyperkalemia resolved. PMHx includes T2DM, HTN, HLD, CAD, PVD and home O2.     Source: Patient, medical record    Diet : Consistent carb evening snack, no conc K, Glucerna x1 daily, health shake x2 daily    Patient requests to receive mashed potatoes because she is "tired" of eating rice. Pt diet has been liberalized to receive mashed potatoes. Pt was provided with a "potassium content of foods" handout and instructed to choose lower potassium-containing foods with the mashed potato liberalization. Pt reports that she eats breakfast the most out of all the meals provided to her; a hardboiled egg and oatmeal. Importance of a consistent carbohydrate diet and pairing protein with carbohydrates was reinforced to patient. Pt reports no nausea/vomiting, no diarrhea, and has not had a BM in two days. Pt is on bowel regimen and encouraged to drink fluids in addition to eating high-fiber foods. Note hypoglycemic episodes are due to medications, not from skipping meals, discussed with NP and RN.     PO intake: good, pt's family brings in food.     Weights:   Dosing wt: 185 pounds (11/29)  No new weights     Pertinent Medications: MEDICATIONS  (STANDING):  ALBUTerol/ipratropium for Nebulization 3 milliLiter(s) Nebulizer <User Schedule>  artificial tears (preservative free) Ophthalmic Solution 1 Drop(s) Both EYES three times a day  aspirin enteric coated 81 milliGRAM(s) Oral daily  Biotene Dry Mouth Oral Rinse 5 milliLiter(s) Swish and Spit two times a day  buDESOnide   0.5 milliGRAM(s) Respule 0.5 milliGRAM(s) Inhalation every 12 hours  cholecalciferol 2000 Unit(s) Oral daily  dextrose 5%. 1000 milliLiter(s) (50 mL/Hr) IV Continuous <Continuous>  dextrose 50% Injectable 12.5 Gram(s) IV Push once  dextrose 50% Injectable 25 Gram(s) IV Push once  dextrose 50% Injectable 25 Gram(s) IV Push once  docusate sodium 100 milliGRAM(s) Oral daily  enoxaparin Injectable 40 milliGRAM(s) SubCutaneous every 24 hours  furosemide   Injectable 20 milliGRAM(s) IV Push daily  insulin glargine Injectable (LANTUS) 10 Unit(s) SubCutaneous at bedtime  insulin lispro (HumaLOG) corrective regimen sliding scale   SubCutaneous three times a day before meals  insulin lispro (HumaLOG) corrective regimen sliding scale   SubCutaneous at bedtime  insulin lispro Injectable (HumaLOG) 6 Unit(s) SubCutaneous before breakfast  insulin lispro Injectable (HumaLOG) 4 Unit(s) SubCutaneous before lunch  insulin lispro Injectable (HumaLOG) 4 Unit(s) SubCutaneous with dinner  isosorbide   mononitrate ER Tablet (IMDUR) 30 milliGRAM(s) Oral daily  melatonin 1 milliGRAM(s) Oral at bedtime  montelukast 10 milliGRAM(s) Oral daily  multivitamin 1 Tablet(s) Oral daily  nystatin    Suspension 212523 Unit(s) Oral four times a day  pantoprazole    Tablet 40 milliGRAM(s) Oral before breakfast  polyethylene glycol 3350 17 Gram(s) Oral daily  predniSONE   Tablet 40 milliGRAM(s) Oral <User Schedule>  Roflumilast (Daliresp) 250 MICROGram(s) 250 MICROGram(s) Oral daily  senna 2 Tablet(s) Oral at bedtime  theophylline ER Capsule 400 milliGRAM(s) Oral daily    MEDICATIONS  (PRN):  bisacodyl Suppository 10 milliGRAM(s) Rectal daily PRN Constipation  dextrose 40% Gel 15 Gram(s) Oral once PRN Blood Glucose LESS THAN 70 milliGRAM(s)/deciliter  glucagon  Injectable 1 milliGRAM(s) IntraMuscular once PRN Glucose LESS THAN 70 milligrams/deciliter  ondansetron Injectable 4 milliGRAM(s) IV Push every 8 hours PRN Nausea and/or Vomiting  sodium chloride 0.65% Nasal 1 Spray(s) Both Nostrils two times a day PRN Nasal Congestion    Pertinent Labs: 12-18 @ 12:58: Na 124<L>, BUN 38<H>, Cr 1.10, <H>, K+ 4.5,  11/20: HbA1c 7.2%    Finger Sticks:  POCT Blood Glucose.: 230 mg/dL (12-18 @ 09:45)  POCT Blood Glucose.: 340 mg/dL (12-18 @ 06:43)  POCT Blood Glucose.: 204 mg/dL (12-17 @ 23:28)  POCT Blood Glucose.: 98 mg/dL (12-17 @ 22:06)  POCT Blood Glucose.: 144 mg/dL (12-17 @ 17:33)  POCT Blood Glucose.: 49 mg/dL (12-17 @ 17:11)  POCT Blood Glucose.: 51 mg/dL (12-17 @ 17:10)      Skin per nursing documentation: no pressure injuries noted  Edema: +3 generalized    Estimated Needs:   [x] no change since previous assessment  [ ] recalculated:     Previous Nutrition Diagnosis: Increased nutrient needs  Nutrition Diagnosis is: ongoing- being addressed with liberalized diet     New Nutrition Diagnosis: none    Recommend  1) Continue current consistent CHO diet with evening snack and no concentrated potassium  2) Monitor need for no concentrated K+  3) Continue Glucerna x1 daily  4) Continue health shake x2 daily  5) Take new weight    Monitoring and Evaluation:   Continue to monitor Nutritional intake, Tolerance to diet prescription, weights, labs, skin integrity    RD remains available upon request and will follow up per protocol Nutrition Follow Up Note  Patient seen for nutrition follow-up assessment. Per chart, pt is a 57 y/o female with ongoing dyspnea with minimal exertion with chronic hypoxic and hypercapnic respiratory failure due to very severe COPD. AURORA and hyperkalemia resolved. PMHx includes T2DM, HTN, HLD, CAD, PVD and home O2.     Source: Patient, medical record, NP, RN    Diet : Consistent carb evening snack, no conc K, Glucerna x1 daily, health shake x2 daily    Patient requests to receive mashed potatoes because she is "tired" of eating rice, so diet has been liberalized to allow mashed potatoes.  Pt reports that she eats breakfast the most out of all the meals provided to her; a hardboiled egg and oatmeal. Importance of a consistent carbohydrate diet and pairing protein with carbohydrates was reinforced to patient. Pt was provided with a "potassium content of foods" handout and instructed to choose lower potassium-containing foods with the mashed potato liberalization. Pt reports no nausea/vomiting, no diarrhea, and has not had a BM in two days. Pt is on bowel regimen, prunes were added to her breakfast and she was encouraged to drink fluids in addition to eating high-fiber foods. Note hypoglycemic episodes are due to medications, not from skipping meals, discussed with NP and RN.     PO intake: good, pt's family brings in food.     Weights:   Dosing wt: 185 pounds (11/29)  No new weights     Pertinent Medications: MEDICATIONS  (STANDING):  ALBUTerol/ipratropium for Nebulization 3 milliLiter(s) Nebulizer <User Schedule>  artificial tears (preservative free) Ophthalmic Solution 1 Drop(s) Both EYES three times a day  aspirin enteric coated 81 milliGRAM(s) Oral daily  Biotene Dry Mouth Oral Rinse 5 milliLiter(s) Swish and Spit two times a day  buDESOnide   0.5 milliGRAM(s) Respule 0.5 milliGRAM(s) Inhalation every 12 hours  cholecalciferol 2000 Unit(s) Oral daily  dextrose 5%. 1000 milliLiter(s) (50 mL/Hr) IV Continuous <Continuous>  dextrose 50% Injectable 12.5 Gram(s) IV Push once  dextrose 50% Injectable 25 Gram(s) IV Push once  dextrose 50% Injectable 25 Gram(s) IV Push once  docusate sodium 100 milliGRAM(s) Oral daily  enoxaparin Injectable 40 milliGRAM(s) SubCutaneous every 24 hours  furosemide   Injectable 20 milliGRAM(s) IV Push daily  insulin glargine Injectable (LANTUS) 10 Unit(s) SubCutaneous at bedtime  insulin lispro (HumaLOG) corrective regimen sliding scale   SubCutaneous three times a day before meals  insulin lispro (HumaLOG) corrective regimen sliding scale   SubCutaneous at bedtime  insulin lispro Injectable (HumaLOG) 6 Unit(s) SubCutaneous before breakfast  insulin lispro Injectable (HumaLOG) 4 Unit(s) SubCutaneous before lunch  insulin lispro Injectable (HumaLOG) 4 Unit(s) SubCutaneous with dinner  isosorbide   mononitrate ER Tablet (IMDUR) 30 milliGRAM(s) Oral daily  melatonin 1 milliGRAM(s) Oral at bedtime  montelukast 10 milliGRAM(s) Oral daily  multivitamin 1 Tablet(s) Oral daily  nystatin    Suspension 039072 Unit(s) Oral four times a day  pantoprazole    Tablet 40 milliGRAM(s) Oral before breakfast  polyethylene glycol 3350 17 Gram(s) Oral daily  predniSONE   Tablet 40 milliGRAM(s) Oral <User Schedule>  Roflumilast (Daliresp) 250 MICROGram(s) 250 MICROGram(s) Oral daily  senna 2 Tablet(s) Oral at bedtime  theophylline ER Capsule 400 milliGRAM(s) Oral daily    MEDICATIONS  (PRN):  bisacodyl Suppository 10 milliGRAM(s) Rectal daily PRN Constipation  dextrose 40% Gel 15 Gram(s) Oral once PRN Blood Glucose LESS THAN 70 milliGRAM(s)/deciliter  glucagon  Injectable 1 milliGRAM(s) IntraMuscular once PRN Glucose LESS THAN 70 milligrams/deciliter  ondansetron Injectable 4 milliGRAM(s) IV Push every 8 hours PRN Nausea and/or Vomiting  sodium chloride 0.65% Nasal 1 Spray(s) Both Nostrils two times a day PRN Nasal Congestion    Pertinent Labs: 12-18 @ 12:58: Na 124<L>, BUN 38<H>, Cr 1.10, <H>, K+ 4.5,  11/20: HbA1c 7.2%    Finger Sticks:  POCT Blood Glucose.: 230 mg/dL (12-18 @ 09:45)  POCT Blood Glucose.: 340 mg/dL (12-18 @ 06:43)  POCT Blood Glucose.: 204 mg/dL (12-17 @ 23:28)  POCT Blood Glucose.: 98 mg/dL (12-17 @ 22:06)  POCT Blood Glucose.: 144 mg/dL (12-17 @ 17:33)  POCT Blood Glucose.: 49 mg/dL (12-17 @ 17:11)  POCT Blood Glucose.: 51 mg/dL (12-17 @ 17:10)      Skin per nursing documentation: no pressure injuries noted  Edema: +3 generalized    Estimated Needs:   [x] no change since previous assessment  [ ] recalculated:     Previous Nutrition Diagnosis: Increased nutrient needs  Nutrition Diagnosis is: ongoing- being addressed with liberalized diet     New Nutrition Diagnosis: none    Recommend  1) Continue current consistent CHO diet with evening snack and no concentrated potassium  2) Monitor need for no concentrated K+  3) Continue Glucerna x1 daily  4) Continue health shake x2 daily  5) Take new weight    Monitoring and Evaluation:   Continue to monitor Nutritional intake, Tolerance to diet prescription, weights, labs, skin integrity    RD remains available upon request and will follow up per protocol Nutrition Follow Up Note  Nutrition consult received and pt seen for nutrition follow-up assessment. Per chart, pt is a 59 y/o female with ongoing dyspnea with minimal exertion with chronic hypoxic and hypercapnic respiratory failure due to very severe COPD. AURORA and hyperkalemia resolved. PMHx includes T2DM, HTN, HLD, CAD, PVD and home O2.     Source: Patient, medical record, NP, RN    Diet : Consistent carb evening snack, no conc K, Glucerna x1 daily, health shake x2 daily    Patient requests to receive mashed potatoes because she is "tired" of eating rice, so diet has been liberalized to allow mashed potatoes within low potassium diet regimen (1 portion is 390mg K+) Pt reports that she eats breakfast the most out of all the meals provided to her; a hardboiled egg and oatmeal. Importance of a consistent carbohydrate diet and pairing protein with carbohydrates was reinforced to patient. Pt was provided with a "potassium content of foods" handout and instructed to choose lower potassium-containing foods with the mashed potato liberalization. Pt reports no nausea/vomiting, no diarrhea, and has not had a BM in two days. Pt is on bowel regimen, prunes were added to her breakfast and she was encouraged to drink fluids in addition to eating high-fiber foods. Note hypoglycemic episodes may be related to medication administration.    PO intake: good, pt's family brings in food.     Weights:   Dosing wt: 185 pounds (11/29)  No new weights     Pertinent Medications: MEDICATIONS  (STANDING):  ALBUTerol/ipratropium for Nebulization 3 milliLiter(s) Nebulizer <User Schedule>  artificial tears (preservative free) Ophthalmic Solution 1 Drop(s) Both EYES three times a day  aspirin enteric coated 81 milliGRAM(s) Oral daily  Biotene Dry Mouth Oral Rinse 5 milliLiter(s) Swish and Spit two times a day  buDESOnide   0.5 milliGRAM(s) Respule 0.5 milliGRAM(s) Inhalation every 12 hours  cholecalciferol 2000 Unit(s) Oral daily  dextrose 5%. 1000 milliLiter(s) (50 mL/Hr) IV Continuous <Continuous>  dextrose 50% Injectable 12.5 Gram(s) IV Push once  dextrose 50% Injectable 25 Gram(s) IV Push once  dextrose 50% Injectable 25 Gram(s) IV Push once  docusate sodium 100 milliGRAM(s) Oral daily  enoxaparin Injectable 40 milliGRAM(s) SubCutaneous every 24 hours  furosemide   Injectable 20 milliGRAM(s) IV Push daily  insulin glargine Injectable (LANTUS) 10 Unit(s) SubCutaneous at bedtime  insulin lispro (HumaLOG) corrective regimen sliding scale   SubCutaneous three times a day before meals  insulin lispro (HumaLOG) corrective regimen sliding scale   SubCutaneous at bedtime  insulin lispro Injectable (HumaLOG) 6 Unit(s) SubCutaneous before breakfast  insulin lispro Injectable (HumaLOG) 4 Unit(s) SubCutaneous before lunch  insulin lispro Injectable (HumaLOG) 4 Unit(s) SubCutaneous with dinner  isosorbide   mononitrate ER Tablet (IMDUR) 30 milliGRAM(s) Oral daily  melatonin 1 milliGRAM(s) Oral at bedtime  montelukast 10 milliGRAM(s) Oral daily  multivitamin 1 Tablet(s) Oral daily  nystatin    Suspension 193297 Unit(s) Oral four times a day  pantoprazole    Tablet 40 milliGRAM(s) Oral before breakfast  polyethylene glycol 3350 17 Gram(s) Oral daily  predniSONE   Tablet 40 milliGRAM(s) Oral <User Schedule>  Roflumilast (Daliresp) 250 MICROGram(s) 250 MICROGram(s) Oral daily  senna 2 Tablet(s) Oral at bedtime  theophylline ER Capsule 400 milliGRAM(s) Oral daily    MEDICATIONS  (PRN):  bisacodyl Suppository 10 milliGRAM(s) Rectal daily PRN Constipation  dextrose 40% Gel 15 Gram(s) Oral once PRN Blood Glucose LESS THAN 70 milliGRAM(s)/deciliter  glucagon  Injectable 1 milliGRAM(s) IntraMuscular once PRN Glucose LESS THAN 70 milligrams/deciliter  ondansetron Injectable 4 milliGRAM(s) IV Push every 8 hours PRN Nausea and/or Vomiting  sodium chloride 0.65% Nasal 1 Spray(s) Both Nostrils two times a day PRN Nasal Congestion    Pertinent Labs: 12-18 @ 12:58: Na 124<L>, BUN 38<H>, Cr 1.10, <H>, K+ 4.5,  11/20: HbA1c 7.2%    Finger Sticks:  POCT Blood Glucose.: 230 mg/dL (12-18 @ 09:45)  POCT Blood Glucose.: 340 mg/dL (12-18 @ 06:43)  POCT Blood Glucose.: 204 mg/dL (12-17 @ 23:28)  POCT Blood Glucose.: 98 mg/dL (12-17 @ 22:06)  POCT Blood Glucose.: 144 mg/dL (12-17 @ 17:33)  POCT Blood Glucose.: 49 mg/dL (12-17 @ 17:11)  POCT Blood Glucose.: 51 mg/dL (12-17 @ 17:10)      Skin per nursing documentation: no pressure injuries noted  Edema: +3 generalized    Estimated Needs:   [x] no change since previous assessment  [ ] recalculated:     Previous Nutrition Diagnosis: Increased nutrient needs  Nutrition Diagnosis is: ongoing- being addressed with liberalized diet     New Nutrition Diagnosis: none    Recommend  1) Continue current consistent CHO diet with evening snack and no concentrated potassium (Note: mashed potatoes allowed, 2 servings per day. ~780mg K+/d)  2) Monitor need for no concentrated K+  3) Continue Glucerna x1 daily  4) Continue health shake x2 daily  5) Take new weight    Monitoring and Evaluation:   Continue to monitor Nutritional intake, Tolerance to diet prescription, weights, labs, skin integrity    RD remains available upon request and will follow up per protocol

## 2018-12-18 NOTE — PROGRESS NOTE ADULT - PROBLEM SELECTOR PLAN 1
-test BG AC/HS  -C/w Lantus 10 units QHS  -Adjust Humalog 6-4-4 units TID w/meals (hold if not eating)  -changed Humalog correction scale AC and HS scale to low dose for now  -Please notify endocrine when steroids further tapered. Will need adjustment to insulin regimen.  -Plan discussed with pt/team/staff.  Contact info: [phone] (24/7). pager [phone]

## 2018-12-18 NOTE — CONSULT NOTE ADULT - SUBJECTIVE AND OBJECTIVE BOX
CARDIOLOGY CONSULT - Dr. Brunson     CHIEF COMPLAINT: KERR     HPI:  58 F PMH COPD on home O2 3L, HTN, HLD, CAD s/p x6 stents, T2DM, pHTN, PVD, p/w progressive worsening dyspnea x 2 weeks. Pt says she normally uses 3L NC atc at home. Has had increasing dyspnea and fatigue, worse with minimal exertion. Huffing/puffing for breath, sob with talking, showering, etc. +inc sputum volume production, pt says it is white in color, denies purulence. +inc O2 requirement now up to 4LNC at home during last 2 wks. +inc rescue inhaler use upto 7x/daily with minimal improvement.  Denies cough. Pt. now with significant b/l upper/lower extremity edema and fluid overload. Pt. endorses no change in her respiratory status. Denies cp. Currently resting comfortably on BIAPAP.    Of note pt was prev evaluated for lung transplant at [place], but during testing had NSTEMI requiring PCI x 6 in , and was then on Effient for 1 year and recommended repeat testing/rehab prior to subsequent evaluation.     PAST MEDICAL & SURGICAL HISTORY:  Hyperlipemia  Chronic sinusitis  Raynaud phenomenon  HTN (hypertension)  DM (diabetes mellitus)  Claustrophobia  COPD (chronic obstructive pulmonary disease)  S/P tonsillectomy          PREVIOUS DIAGNOSTIC TESTING:    [ ] Echocardiogram: < from: Transthoracic Echocardiogram (18 @ 08:59) >  Conclusions:  1. Normal mitral valve.  2. Normal trileaflet aortic valve. Peak transaortic valve  gradient equals 6 mm Hg, mean transaortic valve gradient  equals 3 mm Hg, aortic valve velocity time integral equals  22 cm. Trace aortic regurgitation.  3. Aortic Root: 2.9 cm.  4. Normal left atrium.  LA volume index = 18 cc/m2.  5. Normal left ventricular internal dimensions and wall  thicknesses.  6. Normal left ventricular systolic function. No segmental  wall motion abnormalities.  7. Mild diastolic dysfunction (Stage I).  8. Normal right ventricular size and function.  9. Inadequate tricuspid regurgitation Doppler envelope  precludes estimation of RVSP.  10. Normal tricuspid valve. Minimal tricuspid  regurgitation.  *** Compared with echocardiogram report of 2014, no  significant changes noted.    < end of copied text >    [ ]  Catheterization:  < from: Cardiac Cath Lab - Adult (17 @ 11:16) >  [facility]  Department of Cardiology  [address] [place] [address]  [phone]  Cath Lab Report -- Comprehensive Report  Patient: GUY HARTMAN  Study date: 2017  Account number: [id number]  MR number: [id number]  : 1960  Gender: Female  Race: W  Case Physician(s):  Ronny Echeverria M.D.  Fellow:  Davion Tello M.D.  Referring Physician:  Jason Chapa M.D.  INDICATIONS: Abnormal stress test.  HISTORY: There was no prior cardiac history. The patient has hypertension.  PROCEDURE:  --  Right heart catheterization.  --  Left heart catheterization with ventriculography.  --  Left coronary angiography.  --  Right coronary angiography.  --  Sonosite - Diagnostic.  TECHNIQUE: Oxygen 3 L/min. The risks and alternatives of the procedures and  conscious sedation were explained to the patient and informed consent was  obtained. Cardiac catheterization performed electively.  Local anesthetic given. Right radial artery access. A 5FR SHEATH PINNACLE  was inserted in the vessel. Right femoral vein access. A 7FR SHEATH  PINNACLE was inserted in the vessel. Right heart catheterization. The  procedure was performed utilizing a 7FR SWAN MAYELA catheter. Left heart  catheterization. Ventriculography wasperformed. A 5FR PIG EXPO catheter  was utilized. Left coronary artery angiography. The vessel was injected  utilizing a 5FR FL3.5 EXPO catheter. Right coronary artery angiography.  The vessel was injected utilizing a 5FR FR4.0 EXPO catheter. Sonosite -  Diagnostic. RADIATION EXPOSURE: 3.4 min.  CONTRAST GIVEN: Omnipaque 42 ml.  MEDICATIONS GIVEN: Verapamil (Isoptin, Calan, Covera), 2.5 mg, IA. Heparin,  3000 units, IA.  VENTRICLES: Global left ventricular function was normal. EF estimated was  65 %.  CORONARY VESSELS: The coronary circulation is right dominant.  LM:   --  Mid left main: There was a 30 % stenosis.  LAD:   --  Ostial LAD: There was a 40 % stenosis.  CX:   --  Circumflex: Normal.  RCA:   --  RCA: Normal.  COMPLICATIONS: There were no complications.  DIAGNOSTIC RECOMMENDATIONS: The patient should continue with the present  medications.    < end of copied text >    [ ] Stress Test:  	    MEDICATIONS:  MEDICATIONS  (STANDING):  ALBUTerol/ipratropium for Nebulization 3 milliLiter(s) Nebulizer <User Schedule>  amLODIPine   Tablet 5 milliGRAM(s) Oral daily  artificial tears (preservative free) Ophthalmic Solution 1 Drop(s) Both EYES three times a day  aspirin enteric coated 81 milliGRAM(s) Oral daily  Biotene Dry Mouth Oral Rinse 5 milliLiter(s) Swish and Spit two times a day  buDESOnide   0.5 milliGRAM(s) Respule 0.5 milliGRAM(s) Inhalation every 12 hours  cholecalciferol 2000 Unit(s) Oral daily  dextrose 5%. 1000 milliLiter(s) (50 mL/Hr) IV Continuous <Continuous>  dextrose 50% Injectable 12.5 Gram(s) IV Push once  dextrose 50% Injectable 25 Gram(s) IV Push once  dextrose 50% Injectable 25 Gram(s) IV Push once  docusate sodium 100 milliGRAM(s) Oral daily  enoxaparin Injectable 40 milliGRAM(s) SubCutaneous every 24 hours  insulin glargine Injectable (LANTUS) 10 Unit(s) SubCutaneous at bedtime  insulin lispro (HumaLOG) corrective regimen sliding scale   SubCutaneous three times a day before meals  insulin lispro (HumaLOG) corrective regimen sliding scale   SubCutaneous at bedtime  insulin lispro Injectable (HumaLOG) 6 Unit(s) SubCutaneous before breakfast  insulin lispro Injectable (HumaLOG) 4 Unit(s) SubCutaneous before lunch  insulin lispro Injectable (HumaLOG) 4 Unit(s) SubCutaneous with dinner  isosorbide   mononitrate ER Tablet (IMDUR) 30 milliGRAM(s) Oral daily  melatonin 1 milliGRAM(s) Oral at bedtime  montelukast 10 milliGRAM(s) Oral daily  multivitamin 1 Tablet(s) Oral daily  nystatin    Suspension 210178 Unit(s) Oral four times a day  pantoprazole    Tablet 40 milliGRAM(s) Oral before breakfast  polyethylene glycol 3350 17 Gram(s) Oral daily  predniSONE   Tablet 40 milliGRAM(s) Oral <User Schedule>  Roflumilast (Daliresp) 250 MICROGram(s) 250 MICROGram(s) Oral daily  senna 2 Tablet(s) Oral at bedtime  theophylline ER Capsule 400 milliGRAM(s) Oral daily      FAMILY HISTORY:  FH: MI (myocardial infarction) (Father)  FH: CABG (coronary artery bypass surgery) (Father)      SOCIAL HISTORY:    [x] Non-smoker  [ ] Smoker  [ ] Alcohol    Allergies    No Known Allergies    Intolerances    	    REVIEW OF SYSTEMS:  CONSTITUTIONAL: No fever, weight loss, or fatigue  EYES: No eye pain, visual disturbances, or discharge  ENMT:  No difficulty hearing, tinnitus, vertigo; No sinus or throat pain  NECK: No pain or stiffness  RESPIRATORY: No cough, wheezing, chills or hemoptysis; No Shortness of Breath  CARDIOVASCULAR: No chest pain, palpitations, passing out, dizziness, + leg swelling, upper extremity Edema, +kerr   GASTROINTESTINAL: No abdominal or epigastric pain. No nausea, vomiting, or hematemesis; No diarrhea or constipation. No melena or hematochezia.  GENITOURINARY: No dysuria, frequency, hematuria, or incontinence  NEUROLOGICAL: No headaches, memory loss, loss of strength, numbness, or tremors  SKIN: No itching, burning, rashes, or lesions   	    [x] All others negative	  [ ] Unable to obtain    PHYSICAL EXAM:  T(C): 36.5 (18 @ 12:23), Max: 36.8 (18 @ 06:28)  HR: 86 (18 @ 12:26) (78 - 100)  BP: 103/70 (18 @ 12:23) (101/71 - 121/75)  RR: 18 (18 @ 12:23) (18 - 20)  SpO2: 100% (18 @ 12:26) (100% - 100%)  Wt(kg): --  I&O's Summary    17 Dec 2018 07:01  -  18 Dec 2018 07:00  --------------------------------------------------------  IN: 1320 mL / OUT: 400 mL / NET: 920 mL        Appearance: Normal	  Psychiatry: A & O x 3, Mood & affect appropriate  HEENT:   Normal oral mucosa, PERRL, EOMI	  Lymphatic: No lymphadenopathy  Cardiovascular: Normal S1 S2,RRR, No JVD, No murmurs  Respiratory: b/l diminished   Gastrointestinal:  Soft, Non-tender, + BS	  Skin: No rashes, No ecchymoses, No cyanosis	  Neurologic: Non-focal  Extremities: Normal range of motion, No clubbing, b/l upper, lower extremity edema   Vascular: Peripheral pulses palpable 2+ bilaterally    TELEMETRY: 	    ECG:  	NSR, RBBB   RADIOLOGY: < from: Xray Chest 1 View- PORTABLE-Routine (18 @ 13:10) >  projection.    IMPRESSION:   Clear lungs.     < end of copied text >    OTHER: 	  < from: CT Angio Chest w/ IV Cont (18 @ 16:30) >    IMPRESSION:   1.  No pulmonary embolism.  2. Emphysematous changes of the lung.                DIANA MEDINA M.D., RADIOLOGY RESIDENT  This document has been electronically signed.    < end of copied text >  	  LABS:	 	    CARDIAC MARKERS:                                  12.9   11.66 )-----------( 135      ( 17 Dec 2018 13:33 )             39.0     12-17    127<L>  |  86<L>  |  35<H>  ----------------------------<  283<H>  4.6   |  31  |  1.07    Ca    8.8      17 Dec 2018 09:50        proBNP:   Lipid Profile:   HgA1c:   TSH:

## 2018-12-18 NOTE — PROGRESS NOTE ADULT - SUBJECTIVE AND OBJECTIVE BOX
[facility] - [facility]     PROGRESS NOTE    CHIEF COMPLAINT: chronic hypoxic/hypercapnic respiratory failure; COPD exacerbation; emphysema; dyspnea    INTERVAL HISTORY: told of hypoxemia into the low 80's this AM and placed on BIPAP -> oxygen saturation now on 5lpm nasal canula -> 96%; ABG without acute respiratory acidosis or hypoxemia; using BIPAP on and off during the day and for sleep although she is having difficulty tolerating the hospital masks; still remains quite short of breath with minimal exertion although appears more comfortable at rest; resolved AURORA; ongoing hyponatremia; no cough, sputum production, chest congestion or wheeze; no fevers, chills or sweats; no chest pain/pressure or palpitations; persistent extremity swelling upper > lower; sputum cultures without growth x 2; lower extremity weakness and pain -> statin discontinued - steroids decreased     REVIEW OF SYSTEMS:  Constitutional: As per interval history  HEENT: Within normal limits  CV: As per interval history  Resp: As per interval history  GI: Within normal limits   : Within normal limits  Musculoskeletal: lower extremity weakness and pain  Skin: Within normal limits  Neurological: Within normal limits  Psychiatric: Within normal limits  Endocrine: Within normal limits  Hematologic/Lymphatic: Within normal limits  Allergic/Immunologic: Within normal limits    MEDICATIONS:     Pulmonary "  ALBUTerol/ipratropium for Nebulization 3 milliLiter(s) Nebulizer <User Schedule>  buDESOnide   0.5 milliGRAM(s) Respule 0.5 milliGRAM(s) Inhalation every 12 hours  montelukast 10 milliGRAM(s) Oral daily  theophylline ER Capsule 400 milliGRAM(s) Oral daily      Anti-microbials:  nystatin    Suspension [id number] Unit(s) Oral four times a day      Cardiovascular:  amLODIPine   Tablet 5 milliGRAM(s) Oral daily  furosemide   Injectable 20 milliGRAM(s) IV Push daily  isosorbide   mononitrate ER Tablet (IMDUR) 30 milliGRAM(s) Oral daily      Other:  artificial tears (preservative free) Ophthalmic Solution 1 Drop(s) Both EYES three times a day  aspirin enteric coated 81 milliGRAM(s) Oral daily  Biotene Dry Mouth Oral Rinse 5 milliLiter(s) Swish and Spit two times a day  bisacodyl Suppository 10 milliGRAM(s) Rectal daily PRN  cholecalciferol 2000 Unit(s) Oral daily  dextrose 40% Gel 15 Gram(s) Oral once PRN  dextrose 5%. 1000 milliLiter(s) IV Continuous <Continuous>  dextrose 50% Injectable 12.5 Gram(s) IV Push once  dextrose 50% Injectable 25 Gram(s) IV Push once  dextrose 50% Injectable 25 Gram(s) IV Push once  docusate sodium 100 milliGRAM(s) Oral daily  enoxaparin Injectable 40 milliGRAM(s) SubCutaneous every 24 hours  glucagon  Injectable 1 milliGRAM(s) IntraMuscular once PRN  insulin glargine Injectable (LANTUS) 10 Unit(s) SubCutaneous at bedtime  insulin lispro (HumaLOG) corrective regimen sliding scale   SubCutaneous three times a day before meals  insulin lispro (HumaLOG) corrective regimen sliding scale   SubCutaneous at bedtime  insulin lispro Injectable (HumaLOG) 6 Unit(s) SubCutaneous before breakfast  insulin lispro Injectable (HumaLOG) 4 Unit(s) SubCutaneous before lunch  insulin lispro Injectable (HumaLOG) 4 Unit(s) SubCutaneous with dinner  melatonin 1 milliGRAM(s) Oral at bedtime  multivitamin 1 Tablet(s) Oral daily  ondansetron Injectable 4 milliGRAM(s) IV Push every 8 hours PRN  pantoprazole    Tablet 40 milliGRAM(s) Oral before breakfast  polyethylene glycol 3350 17 Gram(s) Oral daily  predniSONE   Tablet 40 milliGRAM(s) Oral <User Schedule>  Roflumilast (Daliresp) 250 MICROGram(s) 250 MICROGram(s) Oral daily  senna 2 Tablet(s) Oral at bedtime  sodium chloride 0.65% Nasal 1 Spray(s) Both Nostrils two times a day PRN        OBJECTIVE:    I&O's Detail    17 Dec 2018 07:01  -  18 Dec 2018 07:00  --------------------------------------------------------  IN:    Oral Fluid: 1320 mL  Total IN: 1320 mL    OUT:    Voided: 400 mL  Total OUT: 400 mL    Total NET: 920 mL    POCT Blood Glucose.: 230 mg/dL (18 Dec 2018 09:45)  POCT Blood Glucose.: 340 mg/dL (18 Dec 2018 06:43)  POCT Blood Glucose.: 204 mg/dL (17 Dec 2018 23:28)  POCT Blood Glucose.: 98 mg/dL (17 Dec 2018 22:06)  POCT Blood Glucose.: 144 mg/dL (17 Dec 2018 17:33)  POCT Blood Glucose.: 49 mg/dL (17 Dec 2018 17:11)  POCT Blood Glucose.: 51 mg/dL (17 Dec 2018 17:10)      PHYSICAL EXAM:       ICU Vital Signs Last 24 Hrs  T(C): 36.5 (18 Dec 2018 12:23), Max: 36.8 (18 Dec 2018 06:28)  T(F): 97.7 (18 Dec 2018 12:23), Max: 98.3 (18 Dec 2018 06:28)  HR: 86 (18 Dec 2018 12:26) (78 - 100)  BP: 103/70 (18 Dec 2018 12:23) (101/71 - 121/75)  BP(mean): --  ABP: --  ABP(mean): --  RR: 22 (18 Dec 2018 13:35) (18 - 22)  SpO2: 98% (18 Dec 2018 13:35) (98% - 100%) on 5lpm nasal canula     General: Awake. Alert. Cooperative. Mildly tachypneic and using accessory muscles of respiration. Appears stated age. Obese.   HEENT:  Atraumatic. Normocephalic. Anicteric. Normal oral mucosa. PERRL. EOMI.   Neck: Supple. Trachea midline. Thyroid without enlargement/tenderness/nodules. No carotid bruit. No JVD.	  Cardiovascular: Regular rate and rhythm. Distant S1 S2. No murmurs, rubs or gallops.  Respiratory: Respirations unlabored. Markedly decreased breath sounds throughout. Prolonged expiratory phase of respiration. No wheeze. No curvature.  Abdomen: Soft. Non-tender. Non-distended. No organomegaly. No masses. Normal bowel sounds. Obese.  Extremities: Warm to touch. No clubbing or cyanosis. Mild bilateral lower extremity edema up to the thigh. Moderate bilateral upper extremity swelling.  Pulses: Decreased lower extremity peripheral pulses.  Skin: No rashes or lesions. No ecchymoses. No cyanosis. Warm to touch.  Lymph Nodes: Cervical, supraclavicular and axillary nodes normal  Neurological: Motor and sensory examination equal and normal. A and O x 3  Psychiatry: Appropriate mood and affect.       LABS:                        13.1   12.5  )-----------( 129      ( 18 Dec 2018 12:58 )             38.3                         12.9   11.66 )-----------( 135      ( 17 Dec 2018 13:33 )             39.0     12-17    127<L>  |  86<L>  |  35<H>  ----------------------------<  283<H>  4.6   |  31  |  1.07    12-16    129<L>  |  86<L>  |  40<H>  ----------------------------<  175<H>  4.8   |  33<H>  |  0.90    Ca      8.8      12-17    Ca      8.8      12-16    Phos    2.8     12-15    Phos    3.7     12-14      Mg       2.2     12-16    Mg       2.2     12-15    ABG - ( 18 Dec 2018 13:23 )  pH: 7.46  /  pCO2: 47    /  pO2: 88    / HCO3: 33    / Base Excess: 8.0   /  SaO2: 97        ABG - ( 16 Dec 2018 20:53 )  pH: 7.44  /  pCO2: 53    /  pO2: 173   / HCO3: 36    / Base Excess: 10.0  /  SaO2: 100       ABG - ( 13 Dec 2018 06:29 )  pH: 7.30  /  pCO2: 71    /  pO2: 182   / HCO3: 34    / Base Excess: 5.8   /  SaO2: 99        ABG - ( 13 Dec 2018 00:59 )  pH: 7.32  /  pCO2: 71    /  pO2: 75    / HCO3: 35    / Base Excess: 7.1   /  SaO2: 95        ABG - ( 11 Dec 2018 11:45 )  pH: 7.39  /  pCO2: 54    /  pO2: 98    / HCO3: 32    / Base Excess: 6.2   /  SaO2: 98        ABG - ( 09 Dec 2018 11:26 )  pH: 7.35  /  pCO2: 63    /  pO2: 145   / HCO3: 34    / Base Excess: 6.6   /  SaO2: 99      ABG - ( 09 Dec 2018 00:28 )  pH: 7.32  /  pCO2: 71    /  pO2: 124   / HCO3: 36    / Base Excess: 7.6   /  SaO2: 99        ABG - ( 08 Dec 2018 20:50 )  pH: 7.32  /  pCO2: 72    /  pO2: 80    / HCO3: 36    / Base Excess: 7.7   /  SaO2: 95        Serum Pro-Brain Natriuretic Peptide: 254 pg/mL (12-13 @ 14:00)    < from: Transthoracic Echocardiogram (12.07.18 @ 08:59) >    Patient name: GUY HARTMAN  YOB: 1960   Age: 58 (F)   MR#: [id number]  Study Date: 12/7/2018  Location: [address] [place], [place] [id number]: Laquita Armando [facility]  Study quality: Technically good  Referring Physician: Allen ingram MD  BloodPressure: 120/80 mmHg  Height: 163 cm  Weight: 88 kg  BSA: 1.9 m2  ------------------------------------------------------------------------  PROCEDURE: Transthoracic echocardiogram with 2-D, M-Mode  and complete spectral and color flow Doppler.  INDICATION: Other forms of dyspnea (R06.09)  ------------------------------------------------------------------------  Dimensions:    Normal Values:  LA:     3.6    2.0 - 4.0 cm  Ao:     2.9    2.0 - 3.8 cm  SEPTUM: 0.9    0.6 - 1.2 cm  PWT:    0.8    0.6- 1.1 cm  LVIDd:  4.9    3.0 - 5.6 cm  LVIDs:  2.9    1.8 - 4.0 cm  Derived variables:  LVMI: 73 g/m2  RWT: 0.32  Fractional short: 40 %  EF (Teicholtz): 71 %  Doppler Peak Velocity (m/sec): AoV=1.2  ------------------------------------------------------------------------  Observations:  Mitral Valve: Normal mitral valve.  Aortic Valve/Aorta: Normal trileaflet aortic valve. Peak  transaortic valve gradient equals 6 mm Hg, mean transaortic  valve gradient equals 3 mm Hg, aortic valve velocity time  integral equals 22 cm. Trace aortic regurgitation.  Aortic Root: 2.9 cm.  Left Atrium: Normal left atrium.  LA volume index = 18  cc/m2.  Left Ventricle: Normal left ventricular systolic function.  No segmental wall motion abnormalities. Normal left  ventricular internal dimensions and wall thicknesses. Mild  diastolic dysfunction (Stage I).  Right Heart: Normal right atrium. Normal right ventricular  size and function. Normal tricuspid valve. Minimal  tricuspid regurgitation. Normal pulmonic valve.  Pericardium/Pleura: Normal pericardium with no pericardial  effusion.  Hemodynamic: Estimated right atrial pressure is 8 mm Hg.  Inadequate tricuspid regurgitation Doppler envelope  precludes estimation of RVSP.  ------------------------------------------------------------------------  Conclusions:  1. Normal mitral valve.  2. Normal trileaflet aortic valve. Peak transaortic valve  gradient equals 6 mm Hg, mean transaortic valve gradient  equals 3 mm Hg, aortic valve velocity time integral equals  22 cm. Trace aortic regurgitation.  3. Aortic Root: 2.9 cm.  4. Normal left atrium.  LA volume index = 18 cc/m2.  5. Normal left ventricular internal dimensions and wall  thicknesses.  6. Normal left ventricular systolic function. No segmental  wall motion abnormalities.  7. Mild diastolic dysfunction (Stage I).  8. Normal right ventricular size and function.  9. Inadequate tricuspid regurgitation Doppler envelope  precludes estimation of RVSP.  10. Normal tricuspid valve. Minimal tricuspid  regurgitation.  *** Compared with echocardiogram report of 2/18/2014, no  significant changes noted.  ------------------------------------------------------------------------  Confirmed on  12/7/2018 - 13:49:43 by Shefali Gómez M.D.  ------------------------------------------------------------------------    < end of copied text >  ---------------------------------------------------------------------------------------------------------------    MICROBIOLOGY:     Culture - Sputum . (12.07.18 @ 08:34)    Gram Stain:   Rare polymorphonuclear leukocytes per low power field  Rare Squamous epithelial cells per low power field  Numerous Gram Positive Cocci in Pairs and Chains per oil power field    Specimen Source: .Sputum Sputum    Culture Results:   Normal Respiratory Samaria present    Culture - Sputum . (12.05.18 @ 22:50)    Gram Stain:   Moderate polymorphonuclear leukocytes per low power field  Few Squamous epithelial cells per low power field  Moderate Gram Positive Cocci in Pairs and Chains per oil power field    Specimen Source: .Sputum Sputum    Culture Results:   Normal Respiratory Samaria present    Rapid Respiratory Viral Panel (11.30.18 @ 01:30)    Rapid RVP Result: NotDete: The [employer] RVP Rapid uses polymerase chain reaction (PCR) and melt  curve analysis to screen for adenovirus; coronavirus HKU1, NL63, 229E,  OC43; human metapneumovirus (hMPV); human enterovirus/rhinovirus  (Entero/RV); influenza A; influenza A/H1;influenza A/H3; influenza  A/H1-2009; influenza B; parainfluenza viruses 1, 2, 3, 4; respiratory  syncytial virus; Bordetella pertussis; Mycoplasma pneumoniae; and  Chlamydophila pneumoniae.    RADIOLOGY:  [x] Chest radiographs reviewed and interpreted by me    < from: US Kidney and Bladder (12.17.18 @ 16:26) >    EXAM:  US KIDNEYS AND BLADDER                          PROCEDURE DATE:  12/17/2018      IMPRESSION:     Normal renal ultrasound.    MATT ZIMMER M.D., RADIOLOGY RESIDENT  This document has been electronically signed.  RAYMUNDO DUMONT M.D., ATTENDING RADIOLOGIST  This document has been electronically signed. Dec 17 2018  4:56PM    < end of copied text >  ---------------------------------------------------------------------------------------------------------------  < from: Xray Chest 1 View- PORTABLE-Routine (12.13.18 @ 13:10) >    EXAM:  XR CHEST PORTABLE ROUTINE 1V                          PROCEDURE DATE:  12/13/2018      INTERPRETATION:  HISTORY: Shortness of breath    TECHNIQUE: Portable frontal chest x-ray    COMPARISON: Chest x-ray from 11/29/2018    FINDINGS:    No focal consolidation.  There is no pneumothorax. There are no pleural effusions.   The cardiomediastinal silhouette cannot be adequately assessed on this   projection.    IMPRESSION:   Clear lungs.       JEANIE SPEARS M.D., RADIOLOGY RESIDENT  This document has been electronically signed.  JEYSON SANCHEZ M.D., ATTENDING RADIOLOGIST  This document has been electronically signed. Dec 13 2018  3:28PM      < end of copied text >  ---------------------------------------------------------------------------------------------------------------  < from: CT Angio Chest w/ IV Cont (12.04.18 @ 16:30) >    EXAM:  CT ANGIO CHEST (W)AW IC                          PROCEDURE DATE:  12/04/2018      INTERPRETATION:  CT CHEST WITH CONTRAST    INDICATION: Known COPD not responding to steroids and bronchodilators.   Progressively worsening dyspnea. Evaluate for pulmonary embolism.    TECHNIQUE: Enhanced helical images were obtained of the chest. Coronal   and sagittal images were reconstructed. Maximum intensity projection   images were generated. Images were obtained after the uneventful   administration of 60 cc nonionic intravenous Omnipaque 350.  40 cc of   Omnipaque 350 was discarded.    COMPARISON: CT chest 2/18/2014.    FINDINGS:     Lungs And Airways: Emphysematous changes of the lung.      The airways are unremarkable.      Pleura: No pneumothorax. No pleural effusion.    Mediastinum: There are no enlarged chest lymph nodes. The visualized   portion of the thyroid gland is unremarkable.       Heart and Vasculature: No pulmonary embolism.    The main pulmonary artery is normal in caliber. No thoracic aortic   aneurysm or dissection. Atheromatous disease of the aorta.    The heart is normal in size.  There is no pericardial effusion.   Coronary artery disease with calcified plaque involving the right and   left main coronary arteries and the left anterior descending artery.      Upper Abdomen: The upper abdomen is unremarkable.    Bones And Soft Tissues: Degenerative changes of the spine.  The soft   tissues are unremarkable.      IMPRESSION:   1.  No pulmonary embolism.  2. Emphysematous changes of the lung.    DIANA MEDINA M.D., RADIOLOGY RESIDENT  This document has been electronically signed.  JEYSON SANCHEZ M.D., ATTENDING RADIOLOGIST  This document has been electronically signed. Dec  4 2018  5:21PM      < end of copied text >  ---------------------------------------------------------------------------------------------------------------  < from: [facility] Duplex Lower Ext Vein Scan, Bilat (12.06.18 @ 15:40) >    EXAM:  DUPLEX SCAN EXT VEINS LOWER BI                          PROCEDURE DATE:  12/06/2018      INTERPRETATION:  CLINICAL INFORMATION: Shortness of breath, leg pain and   swelling.    COMPARISON: Bilateral lower extremity venous duplex study dated 1/27/2009.    TECHNIQUE: Duplex sonography of the BILATERAL LOWER extremities with   color and spectral Doppler, with and without compression.      FINDINGS:    There is normal compressibility of the bilateral common femoral, femoral   and popliteal veins. No calf vein thrombosis is detected.    Doppler examination shows normal spontaneous and phasic flow.    IMPRESSION:     No evidence of bilateral lower extremity deep venous thrombosis.    UJSTIN ZAVALETA M.D., ATTENDING RADIOLOGIST  This document has been electronically signed. Dec  6 2018  4:03PM    < end of copied text >  ---------------------------------------------------------------------------------------------------------------    < from: [facility] Duplex Upper Ext Vein Scan, Bilat (12.12.18 @ 17:36) >    EXAM:  DUPLEX SCAN EXT VEINS UPPER BI                          PROCEDURE DATE:  12/12/2018      IMPRESSION:     No evidence of BILATERAL upper extremity deep venous thrombosis.    SNEHA KELSEY M.D., RADIOLOGY RESIDENT  This document has been electronically signed.  RAYMUNDO DUMONT M.D., ATTENDING RADIOLOGIST  This document has been electronically signed. Dec 13 2018  9:22AM      < end of copied text >  ---------------------------------------------------------------------------------------------------------------  SPIROMETRY:     FEV1 0.56 liters - 22% predicted  FVC 1.73 liters - 52% predicted  FEV1% 32    c/w very severe obstructive lung disease

## 2018-12-18 NOTE — PROGRESS NOTE ADULT - PROBLEM SELECTOR PLAN 8
Pt with  grade I diastolic dysfunction  euvolemic ; off  diuresis at this time  judicious use of IVF

## 2018-12-18 NOTE — PROGRESS NOTE ADULT - SUBJECTIVE AND OBJECTIVE BOX
Patient is a 58y old  Female who presents with a chief complaint of dyspnea (17 Dec 2018 13:20)      SUBJECTIVE / OVERNIGHT EVENTS:    MEDICATIONS  (STANDING):  ALBUTerol/ipratropium for Nebulization 3 milliLiter(s) Nebulizer <User Schedule>  amLODIPine   Tablet 5 milliGRAM(s) Oral daily  artificial tears (preservative free) Ophthalmic Solution 1 Drop(s) Both EYES three times a day  aspirin enteric coated 81 milliGRAM(s) Oral daily  Biotene Dry Mouth Oral Rinse 5 milliLiter(s) Swish and Spit two times a day  buDESOnide   0.5 milliGRAM(s) Respule 0.5 milliGRAM(s) Inhalation every 12 hours  cholecalciferol 2000 Unit(s) Oral daily  dextrose 5%. 1000 milliLiter(s) (50 mL/Hr) IV Continuous <Continuous>  dextrose 50% Injectable 12.5 Gram(s) IV Push once  dextrose 50% Injectable 25 Gram(s) IV Push once  dextrose 50% Injectable 25 Gram(s) IV Push once  docusate sodium 100 milliGRAM(s) Oral daily  enoxaparin Injectable 40 milliGRAM(s) SubCutaneous every 24 hours  insulin glargine Injectable (LANTUS) 10 Unit(s) SubCutaneous at bedtime  insulin lispro (HumaLOG) corrective regimen sliding scale   SubCutaneous three times a day before meals  insulin lispro (HumaLOG) corrective regimen sliding scale   SubCutaneous at bedtime  insulin lispro Injectable (HumaLOG) 6 Unit(s) SubCutaneous before breakfast  insulin lispro Injectable (HumaLOG) 4 Unit(s) SubCutaneous before lunch  insulin lispro Injectable (HumaLOG) 4 Unit(s) SubCutaneous with dinner  isosorbide   mononitrate ER Tablet (IMDUR) 30 milliGRAM(s) Oral daily  melatonin 1 milliGRAM(s) Oral at bedtime  montelukast 10 milliGRAM(s) Oral daily  multivitamin 1 Tablet(s) Oral daily  nystatin    Suspension 844955 Unit(s) Oral four times a day  pantoprazole    Tablet 40 milliGRAM(s) Oral before breakfast  polyethylene glycol 3350 17 Gram(s) Oral daily  predniSONE   Tablet 40 milliGRAM(s) Oral <User Schedule>  Roflumilast (Daliresp) 250 MICROGram(s) 250 MICROGram(s) Oral daily  senna 2 Tablet(s) Oral at bedtime  theophylline ER Capsule 400 milliGRAM(s) Oral daily    MEDICATIONS  (PRN):  bisacodyl Suppository 10 milliGRAM(s) Rectal daily PRN Constipation  dextrose 40% Gel 15 Gram(s) Oral once PRN Blood Glucose LESS THAN 70 milliGRAM(s)/deciliter  glucagon  Injectable 1 milliGRAM(s) IntraMuscular once PRN Glucose LESS THAN 70 milligrams/deciliter  ondansetron Injectable 4 milliGRAM(s) IV Push every 8 hours PRN Nausea and/or Vomiting  sodium chloride 0.65% Nasal 1 Spray(s) Both Nostrils two times a day PRN Nasal Congestion      Vital Signs Last 24 Hrs  T(C): 36.8 (18 Dec 2018 06:28), Max: 36.8 (18 Dec 2018 06:28)  T(F): 98.3 (18 Dec 2018 06:28), Max: 98.3 (18 Dec 2018 06:28)  HR: 91 (18 Dec 2018 10:00) (78 - 100)  BP: 121/75 (18 Dec 2018 06:28) (101/71 - 121/75)  BP(mean): --  RR: 18 (18 Dec 2018 06:28) (18 - 20)  SpO2: 100% (18 Dec 2018 10:00) (100% - 100%)  CAPILLARY BLOOD GLUCOSE      POCT Blood Glucose.: 230 mg/dL (18 Dec 2018 09:45)  POCT Blood Glucose.: 340 mg/dL (18 Dec 2018 06:43)  POCT Blood Glucose.: 204 mg/dL (17 Dec 2018 23:28)  POCT Blood Glucose.: 98 mg/dL (17 Dec 2018 22:06)  POCT Blood Glucose.: 144 mg/dL (17 Dec 2018 17:33)  POCT Blood Glucose.: 49 mg/dL (17 Dec 2018 17:11)  POCT Blood Glucose.: 51 mg/dL (17 Dec 2018 17:10)  POCT Blood Glucose.: 159 mg/dL (17 Dec 2018 13:16)    I&O's Summary    17 Dec 2018 07:01  -  18 Dec 2018 07:00  --------------------------------------------------------  IN: 1320 mL / OUT: 400 mL / NET: 920 mL        PHYSICAL EXAM:  GENERAL: NAD, well-developed  HEAD:  Atraumatic, Normocephalic  EYES: EOMI, PERRLA, conjunctiva and sclera clear  NECK: Supple, No JVD  CHEST/LUNG: Clear to auscultation bilaterally; No wheeze  HEART: Regular rate and rhythm; No murmurs, rubs, or gallops  ABDOMEN: Soft, Nontender, Nondistended; Bowel sounds present  EXTREMITIES:  2+ Peripheral Pulses, No clubbing, cyanosis, or edema  PSYCH: AAOx3  NEUROLOGY: non-focal  SKIN: No rashes or lesions    LABS:                        12.9   11.66 )-----------( 135      ( 17 Dec 2018 13:33 )             39.0     12-17    127<L>  |  86<L>  |  35<H>  ----------------------------<  283<H>  4.6   |  31  |  1.07    Ca    8.8      17 Dec 2018 09:50                RADIOLOGY & ADDITIONAL TESTS:    Imaging Personally Reviewed:    Consultant(s) Notes Reviewed:      Care Discussed with Consultants/Other Providers: Patient is a 58y old  Female who presents with a chief complaint of dyspnea (17 Dec 2018 13:20)      SUBJECTIVE / OVERNIGHT EVENTS:  Pt seen and examined. No acute events overnight. She reports unchanged SOB w/minimal exertion. Does not do well off BIPAP; feels sob on 5L.  Has been having hypoglycemic episodes. Endocrine is following.    MEDICATIONS  (STANDING):  ALBUTerol/ipratropium for Nebulization 3 milliLiter(s) Nebulizer <User Schedule>  amLODIPine   Tablet 5 milliGRAM(s) Oral daily  artificial tears (preservative free) Ophthalmic Solution 1 Drop(s) Both EYES three times a day  aspirin enteric coated 81 milliGRAM(s) Oral daily  Biotene Dry Mouth Oral Rinse 5 milliLiter(s) Swish and Spit two times a day  buDESOnide   0.5 milliGRAM(s) Respule 0.5 milliGRAM(s) Inhalation every 12 hours  cholecalciferol 2000 Unit(s) Oral daily  dextrose 5%. 1000 milliLiter(s) (50 mL/Hr) IV Continuous <Continuous>  dextrose 50% Injectable 12.5 Gram(s) IV Push once  dextrose 50% Injectable 25 Gram(s) IV Push once  dextrose 50% Injectable 25 Gram(s) IV Push once  docusate sodium 100 milliGRAM(s) Oral daily  enoxaparin Injectable 40 milliGRAM(s) SubCutaneous every 24 hours  insulin glargine Injectable (LANTUS) 10 Unit(s) SubCutaneous at bedtime  insulin lispro (HumaLOG) corrective regimen sliding scale   SubCutaneous three times a day before meals  insulin lispro (HumaLOG) corrective regimen sliding scale   SubCutaneous at bedtime  insulin lispro Injectable (HumaLOG) 6 Unit(s) SubCutaneous before breakfast  insulin lispro Injectable (HumaLOG) 4 Unit(s) SubCutaneous before lunch  insulin lispro Injectable (HumaLOG) 4 Unit(s) SubCutaneous with dinner  isosorbide   mononitrate ER Tablet (IMDUR) 30 milliGRAM(s) Oral daily  melatonin 1 milliGRAM(s) Oral at bedtime  montelukast 10 milliGRAM(s) Oral daily  multivitamin 1 Tablet(s) Oral daily  nystatin    Suspension 250819 Unit(s) Oral four times a day  pantoprazole    Tablet 40 milliGRAM(s) Oral before breakfast  polyethylene glycol 3350 17 Gram(s) Oral daily  predniSONE   Tablet 40 milliGRAM(s) Oral <User Schedule>  Roflumilast (Daliresp) 250 MICROGram(s) 250 MICROGram(s) Oral daily  senna 2 Tablet(s) Oral at bedtime  theophylline ER Capsule 400 milliGRAM(s) Oral daily    MEDICATIONS  (PRN):  bisacodyl Suppository 10 milliGRAM(s) Rectal daily PRN Constipation  dextrose 40% Gel 15 Gram(s) Oral once PRN Blood Glucose LESS THAN 70 milliGRAM(s)/deciliter  glucagon  Injectable 1 milliGRAM(s) IntraMuscular once PRN Glucose LESS THAN 70 milligrams/deciliter  ondansetron Injectable 4 milliGRAM(s) IV Push every 8 hours PRN Nausea and/or Vomiting  sodium chloride 0.65% Nasal 1 Spray(s) Both Nostrils two times a day PRN Nasal Congestion      Vital Signs Last 24 Hrs  T(C): 36.8 (18 Dec 2018 06:28), Max: 36.8 (18 Dec 2018 06:28)  T(F): 98.3 (18 Dec 2018 06:28), Max: 98.3 (18 Dec 2018 06:28)  HR: 91 (18 Dec 2018 10:00) (78 - 100)  BP: 121/75 (18 Dec 2018 06:28) (101/71 - 121/75)  BP(mean): --  RR: 18 (18 Dec 2018 06:28) (18 - 20)  SpO2: 100% (18 Dec 2018 10:00) (100% - 100%)  CAPILLARY BLOOD GLUCOSE      POCT Blood Glucose.: 230 mg/dL (18 Dec 2018 09:45)  POCT Blood Glucose.: 340 mg/dL (18 Dec 2018 06:43)  POCT Blood Glucose.: 204 mg/dL (17 Dec 2018 23:28)  POCT Blood Glucose.: 98 mg/dL (17 Dec 2018 22:06)  POCT Blood Glucose.: 144 mg/dL (17 Dec 2018 17:33)  POCT Blood Glucose.: 49 mg/dL (17 Dec 2018 17:11)  POCT Blood Glucose.: 51 mg/dL (17 Dec 2018 17:10)  POCT Blood Glucose.: 159 mg/dL (17 Dec 2018 13:16)    I&O's Summary    17 Dec 2018 07:01  -  18 Dec 2018 07:00  --------------------------------------------------------  IN: 1320 mL / OUT: 400 mL / NET: 920 mL        PHYSICAL EXAM:  GENERAL: NAD, anicteric, afebrile  HEAD:  Atraumatic, Normocephalic  EYES: EOMI, PERRLA, conjunctiva and sclera clear  NECK: Supple, No JVD  CHEST/LUNG: Clear to auscultation bilaterally; No wheeze  HEART: Regular rate and rhythm; No murmurs, rubs, or gallops  ABDOMEN: Soft, Nontender, Nondistended; Bowel sounds present  EXTREMITIES:  2+ Peripheral Pulses, b/l UE/LE edema  PSYCH: AAOx3  NEUROLOGY: non-focal  SKIN: No rashes or lesions    LABS:                        12.9   11.66 )-----------( 135      ( 17 Dec 2018 13:33 )             39.0     12-17    127<L>  |  86<L>  |  35<H>  ----------------------------<  283<H>  4.6   |  31  |  1.07    Ca    8.8      17 Dec 2018 09:50                  Consultant(s) Notes Reviewed:  Pulmonary    Care Discussed with Consultants/Other Providers: Pulmonary

## 2018-12-18 NOTE — PROGRESS NOTE ADULT - SUBJECTIVE AND OBJECTIVE BOX
Diabetes Follow up note: Saw pt earlier  Interval Hx: 57 y/o F w/h/o controlled T2DM on Metformin 500mg bid. Also h/o CAD and COPD. Here with COPD exacerbation> remains on Prednisone 40mg daily. Pt w/hypoglycemic episode again yesterday afternoon due to poor PO intake (dislikes hospital food) and not getting Prednisone stat order at 2pm but at 6:44pm. Also received Lantus 8 units instead of 10 last night with rebound hyperglycemia after steroid dose given late. Pt denies eating at night.       Review of Systems:  General: As above.   GI: Tolerating POs without any N/V/D/ABD PAIN.  CV: No CP/SOB on and off specially at night. States her lungs are not responding to meds and can't ambulate or have any activity.  ENDO:  no S&Sx of hypoglycemia ac dinner yesterday.      MEDS:  insulin glargine Injectable (LANTUS) 8 Unit(s) SubCutaneous at bedtime  insulin lispro (HumaLOG) corrective regimen sliding scale   SubCutaneous three times a day before meals  insulin lispro (HumaLOG) corrective regimen sliding scale   SubCutaneous at bedtime  insulin lispro Injectable (HumaLOG) 6 Unit(s) SubCutaneous before lunch  insulin lispro Injectable (HumaLOG) 8 Unit(s) SubCutaneous before breakfast  insulin lispro Injectable (HumaLOG) 6 Unit(s) SubCutaneous with dinner  predniSONE   Tablet 40 milliGRAM(s) Oral daily  cholecalciferol 2000 Unit(s) Oral daily  predniSONE   Tablet 40 milliGRAM(s) Oral daily  theophylline ER Capsule 400 milliGRAM(s) Oral daily    Allergies    No Known Allergies    PE:   General: Female lying in bed in NAD.   Vital Signs Last 24 Hrs  T(C): 36.5 (12-18-18 @ 12:23), Max: 36.8 (12-18-18 @ 06:28)  T(F): 97.7 (12-18-18 @ 12:23), Max: 98.3 (12-18-18 @ 06:28)  HR: 86 (12-18-18 @ 12:26) (78 - 100)  BP: 103/70 (12-18-18 @ 12:23) (101/71 - 121/75)  BP(mean): --  RR: 22 (12-18-18 @ 13:35) (18 - 22)  SpO2: 98% (12-18-18 @ 13:35) (98% - 100%)  Abd: Soft, NT, ND, Obese.   Extremities: Warm. noted worsening LE edema today. Pt has TOMAS stockings off.  Neuro: A&O X3    LABS:  POCT Blood Glucose.: 127 mg/dL (12-18-18 @ 15:22)  POCT Blood Glucose.: 230 mg/dL (12-18-18 @ 09:45)  POCT Blood Glucose.: 340 mg/dL (12-18-18 @ 06:43)  POCT Blood Glucose.: 204 mg/dL (12-17-18 @ 23:28)  POCT Blood Glucose.: 98 mg/dL (12-17-18 @ 22:06)  POCT Blood Glucose.: 144 mg/dL (12-17-18 @ 17:33)  POCT Blood Glucose.: 49 mg/dL (12-17-18 @ 17:11)  POCT Blood Glucose.: 51 mg/dL (12-17-18 @ 17:10)  POCT Blood Glucose.: 159 mg/dL (12-17-18 @ 13:16)  POCT Blood Glucose.: 319 mg/dL (12-17-18 @ 08:55)  POCT Blood Glucose.: 300 mg/dL (12-17-18 @ 08:54)  POCT Blood Glucose.: 93 mg/dL (12-16-18 @ 21:18)  POCT Blood Glucose.: 119 mg/dL (12-16-18 @ 17:45)  POCT Blood Glucose.: 74 mg/dL (12-16-18 @ 17:23)  POCT Blood Glucose.: 73 mg/dL (12-16-18 @ 17:01)  POCT Blood Glucose.: 88 mg/dL (12-16-18 @ 16:44)  POCT Blood Glucose.: 77 mg/dL (12-16-18 @ 16:29)  POCT Blood Glucose.: 65 mg/dL (12-16-18 @ 16:10)  POCT Blood Glucose.: 50 mg/dL (12-16-18 @ 15:55)  POCT Blood Glucose.: 52 mg/dL (12-16-18 @ 15:54)                          13.1   12.5  )-----------( 129      ( 18 Dec 2018 12:58 )             38.3                           12.9   10.74 )-----------( 132      ( 16 Dec 2018 11:40 )             38.9    12-18    124<L>  |  87<L>  |  38<H>  ----------------------------<  166<H>  4.5   |  24  |  1.10    Ca    8.9      18 Dec 2018 12:58        Hemoglobin A1C, Whole Blood: 7.2 % <H> [4.0 - 5.6] (11-30-18 @ 07:58)

## 2018-12-18 NOTE — PROGRESS NOTE ADULT - PROBLEM SELECTOR PLAN 1
Prednisone 40mg qd   CTA showed no PE, no PNA.  Continue Pulmicort, Duoneb ATC (with transition back to spiriva upon discharge), Theophylline, and Singulair.  Completed 7 days of biaxin   Echo report noted, mild diastolic dysfunction, no significant changes from prior study in 2014.  Home Trilogy vent arranged by pulmonary.  minimal improvement clinically  c/w bipap   c/w Roflumilast 250mcg qd  per 2nd opinion Pulmonary ( Dr Leonides escobar)  No documented contraindications to concomittant use   Pulm follow up with [facility]/transplant list  Dr Leonides escobar Azithromycin 3x a week but wants Cardiology clearance prior to initiating; d/w pt's primary Cardiologist Dr Jason Chapa ; Dr Brunson is covering in [facility] and will see the pt ; pt is in agreement

## 2018-12-19 DIAGNOSIS — E87.1 HYPO-OSMOLALITY AND HYPONATREMIA: ICD-10-CM

## 2018-12-19 LAB
ANION GAP SERPL CALC-SCNC: 8 MMOL/L — SIGNIFICANT CHANGE UP (ref 5–17)
BASOPHILS # BLD AUTO: 0 K/UL — SIGNIFICANT CHANGE UP (ref 0–0.2)
BASOPHILS NFR BLD AUTO: 0.2 % — SIGNIFICANT CHANGE UP (ref 0–2)
BUN SERPL-MCNC: 39 MG/DL — HIGH (ref 7–23)
CALCIUM SERPL-MCNC: 9.1 MG/DL — SIGNIFICANT CHANGE UP (ref 8.4–10.5)
CHLORIDE SERPL-SCNC: 89 MMOL/L — LOW (ref 96–108)
CO2 SERPL-SCNC: 34 MMOL/L — HIGH (ref 22–31)
CREAT SERPL-MCNC: 1.11 MG/DL — SIGNIFICANT CHANGE UP (ref 0.5–1.3)
EOSINOPHIL # BLD AUTO: 0 K/UL — SIGNIFICANT CHANGE UP (ref 0–0.5)
EOSINOPHIL NFR BLD AUTO: 0 % — SIGNIFICANT CHANGE UP (ref 0–6)
GLUCOSE BLDC GLUCOMTR-MCNC: 193 MG/DL — HIGH (ref 70–99)
GLUCOSE BLDC GLUCOMTR-MCNC: 309 MG/DL — HIGH (ref 70–99)
GLUCOSE BLDC GLUCOMTR-MCNC: 75 MG/DL — SIGNIFICANT CHANGE UP (ref 70–99)
GLUCOSE BLDC GLUCOMTR-MCNC: 85 MG/DL — SIGNIFICANT CHANGE UP (ref 70–99)
GLUCOSE SERPL-MCNC: 87 MG/DL — SIGNIFICANT CHANGE UP (ref 70–99)
HCT VFR BLD CALC: 36.4 % — SIGNIFICANT CHANGE UP (ref 34.5–45)
HGB BLD-MCNC: 12.7 G/DL — SIGNIFICANT CHANGE UP (ref 11.5–15.5)
LYMPHOCYTES # BLD AUTO: 14 % — SIGNIFICANT CHANGE UP (ref 13–44)
LYMPHOCYTES # BLD AUTO: 2.1 K/UL — SIGNIFICANT CHANGE UP (ref 1–3.3)
MCHC RBC-ENTMCNC: 30 PG — SIGNIFICANT CHANGE UP (ref 27–34)
MCHC RBC-ENTMCNC: 34.9 GM/DL — SIGNIFICANT CHANGE UP (ref 32–36)
MCV RBC AUTO: 86 FL — SIGNIFICANT CHANGE UP (ref 80–100)
MONOCYTES # BLD AUTO: 1 K/UL — HIGH (ref 0–0.9)
MONOCYTES NFR BLD AUTO: 6.6 % — SIGNIFICANT CHANGE UP (ref 2–14)
NEUTROPHILS # BLD AUTO: 12 K/UL — HIGH (ref 1.8–7.4)
NEUTROPHILS NFR BLD AUTO: 79.2 % — HIGH (ref 43–77)
PLATELET # BLD AUTO: 145 K/UL — LOW (ref 150–400)
POTASSIUM SERPL-MCNC: 3.8 MMOL/L — SIGNIFICANT CHANGE UP (ref 3.5–5.3)
POTASSIUM SERPL-SCNC: 3.8 MMOL/L — SIGNIFICANT CHANGE UP (ref 3.5–5.3)
RBC # BLD: 4.24 M/UL — SIGNIFICANT CHANGE UP (ref 3.8–5.2)
RBC # FLD: 13.6 % — SIGNIFICANT CHANGE UP (ref 10.3–14.5)
SODIUM SERPL-SCNC: 131 MMOL/L — LOW (ref 135–145)
WBC # BLD: 15.1 K/UL — HIGH (ref 3.8–10.5)
WBC # FLD AUTO: 15.1 K/UL — HIGH (ref 3.8–10.5)

## 2018-12-19 PROCEDURE — 99232 SBSQ HOSP IP/OBS MODERATE 35: CPT | Mod: GC

## 2018-12-19 PROCEDURE — 99233 SBSQ HOSP IP/OBS HIGH 50: CPT

## 2018-12-19 PROCEDURE — 99232 SBSQ HOSP IP/OBS MODERATE 35: CPT

## 2018-12-19 RX ORDER — INSULIN LISPRO 100/ML
4 VIAL (ML) SUBCUTANEOUS
Qty: 0 | Refills: 0 | Status: DISCONTINUED | OUTPATIENT
Start: 2018-12-19 | End: 2018-12-21

## 2018-12-19 RX ORDER — INSULIN LISPRO 100/ML
2 VIAL (ML) SUBCUTANEOUS
Qty: 0 | Refills: 0 | Status: DISCONTINUED | OUTPATIENT
Start: 2018-12-19 | End: 2018-12-30

## 2018-12-19 RX ORDER — INSULIN LISPRO 100/ML
2 VIAL (ML) SUBCUTANEOUS
Qty: 0 | Refills: 0 | Status: DISCONTINUED | OUTPATIENT
Start: 2018-12-19 | End: 2018-12-21

## 2018-12-19 RX ORDER — BACITRACIN ZINC 500 UNIT/G
1 OINTMENT IN PACKET (EA) TOPICAL
Qty: 0 | Refills: 0 | Status: COMPLETED | OUTPATIENT
Start: 2018-12-19 | End: 2018-12-22

## 2018-12-19 RX ORDER — INSULIN GLARGINE 100 [IU]/ML
7 INJECTION, SOLUTION SUBCUTANEOUS AT BEDTIME
Qty: 0 | Refills: 0 | Status: DISCONTINUED | OUTPATIENT
Start: 2018-12-19 | End: 2018-12-21

## 2018-12-19 RX ADMIN — Medication 0.5 MILLIGRAM(S): at 06:15

## 2018-12-19 RX ADMIN — Medication 2: at 22:15

## 2018-12-19 RX ADMIN — Medication 3 MILLILITER(S): at 18:26

## 2018-12-19 RX ADMIN — Medication 2 UNIT(S): at 13:21

## 2018-12-19 RX ADMIN — Medication 1 APPLICATION(S): at 18:26

## 2018-12-19 RX ADMIN — Medication 20 MILLIGRAM(S): at 06:14

## 2018-12-19 RX ADMIN — Medication 500000 UNIT(S): at 16:28

## 2018-12-19 RX ADMIN — Medication 400 MILLIGRAM(S): at 10:27

## 2018-12-19 RX ADMIN — Medication 0.5 MILLIGRAM(S): at 18:21

## 2018-12-19 RX ADMIN — Medication 3 MILLILITER(S): at 06:16

## 2018-12-19 RX ADMIN — Medication 100 MILLIGRAM(S): at 12:15

## 2018-12-19 RX ADMIN — INSULIN GLARGINE 7 UNIT(S): 100 INJECTION, SOLUTION SUBCUTANEOUS at 22:29

## 2018-12-19 RX ADMIN — Medication 3 MILLILITER(S): at 22:12

## 2018-12-19 RX ADMIN — SENNA PLUS 2 TABLET(S): 8.6 TABLET ORAL at 22:14

## 2018-12-19 RX ADMIN — Medication 2 UNIT(S): at 18:19

## 2018-12-19 RX ADMIN — PANTOPRAZOLE SODIUM 40 MILLIGRAM(S): 20 TABLET, DELAYED RELEASE ORAL at 06:16

## 2018-12-19 RX ADMIN — Medication 3 MILLILITER(S): at 10:26

## 2018-12-19 RX ADMIN — Medication 1 MILLIGRAM(S): at 22:16

## 2018-12-19 RX ADMIN — Medication 1 DROP(S): at 22:15

## 2018-12-19 RX ADMIN — Medication 1 TABLET(S): at 12:15

## 2018-12-19 RX ADMIN — Medication 81 MILLIGRAM(S): at 12:16

## 2018-12-19 RX ADMIN — Medication 500000 UNIT(S): at 12:14

## 2018-12-19 RX ADMIN — Medication 1 DROP(S): at 13:21

## 2018-12-19 RX ADMIN — MONTELUKAST 10 MILLIGRAM(S): 4 TABLET, CHEWABLE ORAL at 12:16

## 2018-12-19 RX ADMIN — ISOSORBIDE MONONITRATE 30 MILLIGRAM(S): 60 TABLET, EXTENDED RELEASE ORAL at 12:17

## 2018-12-19 RX ADMIN — Medication 2000 UNIT(S): at 12:16

## 2018-12-19 RX ADMIN — Medication 1 DROP(S): at 06:16

## 2018-12-19 RX ADMIN — Medication 3 MILLILITER(S): at 13:23

## 2018-12-19 RX ADMIN — ONDANSETRON 4 MILLIGRAM(S): 8 TABLET, FILM COATED ORAL at 14:22

## 2018-12-19 RX ADMIN — ENOXAPARIN SODIUM 40 MILLIGRAM(S): 100 INJECTION SUBCUTANEOUS at 22:13

## 2018-12-19 RX ADMIN — Medication 1: at 18:20

## 2018-12-19 NOTE — PROGRESS NOTE ADULT - PROBLEM SELECTOR PLAN 1
in setting of ARB use and hyperglycemia, most likely due to low elida/renin state; then in setting of AURORA and acidosis. Was taken off Benicar, and switched to Norvasc and K improved. Has been on steroids- less likely any adrenal insufficiency.  - HyperK now resolved.  - C/w Bipap to help improving CO2 retention resulting in respiratory acidosis.  - If serum bicarb >35, give Diamox 250mg IV and hold lasix.     once bicarb trends down, restart lasix.   - Continue to control blood glucose.   - Low K diet.  - Monitor K.  - Avoid ACEi/ARB therapy.

## 2018-12-19 NOTE — PROGRESS NOTE ADULT - PROBLEM SELECTOR PLAN 1
Decrease Prednisone to 30mg po qd per Pulmonary reccs  Continue Pulmicort, Duoneb ATC (with transition back to spiriva upon discharge), c/w Theophylline, and Singulair.  Completed 7 days of biaxin   Home Trilogy vent arranged by pulmonary.  minimal improvement clinically  c/w bipap   c/w Roflumilast 250mcg qd  per 2nd opinion Pulmonary ( Dr Leonides escobar)  No documented contraindications to concomittant use with theophylline  Pulm follow up with [place]/transplant list  f/up theophylline level

## 2018-12-19 NOTE — PROGRESS NOTE ADULT - PROBLEM SELECTOR PLAN 1
-test BG AC/HS  -Decrease Lantus dose to 7 units QHS  -Adjust Humalog 4-2-2 units TID w/meals (hold if not eating)  -c/w Humalog correction scale AC and HS scale to low dose for now  -Please notify endocrine when steroids further tapered!!  -Plan discussed with pt/team.  Contact info: [phone] (24/7). pager [phone]

## 2018-12-19 NOTE — PROGRESS NOTE ADULT - SUBJECTIVE AND OBJECTIVE BOX
Diabetes Follow up note: Saw pt earlier today  Interval Hx: 57 y/o F w/h/o controlled T2DM on Metformin 500mg bid. Also h/o CAD and COPD. Here with COPD exacerbation> remains on Prednisone 40mg daily. Pt now getting Prednisone in am. Noted FBG dropped to 75 today while on Lantus 10 units> no hypoglycemic symptoms reported by pt.  Pt reports no improvement on respiratory symptoms. Unable to ambulate without SOB , Requires bipap at night. Noted team decreasing Prednisone to 30 mg starting tomorrow am. RD consult appreciated regarding potassium restricted diet. Now pt allow to have some mashed potatoes.        Review of Systems:  General: As above.   GI: Tolerating POs without any N/V/D/ABD PAIN.   CV: No CP/SOB on and off specially at night. States her lungs are not responding to meds and can't ambulate or have any activity.  ENDO:  no S&Sx of hypoglycemia ac dinner yesterday.      MEDS:  insulin glargine Injectable (LANTUS) 10 Unit(s) SubCutaneous at bedtime  insulin lispro (HumaLOG) corrective regimen sliding scale   SubCutaneous three times a day before meals  insulin lispro (HumaLOG) corrective regimen sliding scale   SubCutaneous at bedtime  insulin lispro Injectable (HumaLOG) 4 Unit(s) SubCutaneous before lunch  insulin lispro Injectable (HumaLOG) 6 Unit(s) SubCutaneous before breakfast  insulin lispro Injectable (HumaLOG) 4 Unit(s) SubCutaneous with dinner  theophylline ER Capsule 400 milliGRAM(s) Oral daily    Allergies    No Known Allergies    PE:   General: Female lying in bed in NAD.   Vital Signs Last 24 Hrs  T(C): 36.6 (12-19-18 @ 09:59), Max: 36.9 (12-18-18 @ 17:06)  T(F): 97.8 (12-19-18 @ 09:59), Max: 98.5 (12-18-18 @ 17:06)  HR: 88 (12-19-18 @ 10:02) (70 - 109)  BP: 118/70 (12-19-18 @ 09:59) (100/60 - 136/76)  BP(mean): --  RR: 19 (12-19-18 @ 10:02) (18 - 22)  SpO2: 100% (12-19-18 @ 10:02) (94% - 100%)  Abd: Soft, NT, ND, Obese.   Extremities: Warm. 1+ LE edema slightly improved from yesterday. TOMAS stockings off.  Neuro: A&O X3    LABS:  POCT Blood Glucose.: 75 mg/dL (12-19-18 @ 09:36)  POCT Blood Glucose.: 237 mg/dL (12-18-18 @ 22:11)  POCT Blood Glucose.: 153 mg/dL (12-18-18 @ 18:54)  POCT Blood Glucose.: 152 mg/dL (12-18-18 @ 17:24)  POCT Blood Glucose.: 127 mg/dL (12-18-18 @ 15:22)  POCT Blood Glucose.: 230 mg/dL (12-18-18 @ 09:45)  POCT Blood Glucose.: 340 mg/dL (12-18-18 @ 06:43)  POCT Blood Glucose.: 204 mg/dL (12-17-18 @ 23:28)  POCT Blood Glucose.: 98 mg/dL (12-17-18 @ 22:06)  POCT Blood Glucose.: 144 mg/dL (12-17-18 @ 17:33)  POCT Blood Glucose.: 49 mg/dL (12-17-18 @ 17:11)  POCT Blood Glucose.: 51 mg/dL (12-17-18 @ 17:10)  POCT Blood Glucose.: 159 mg/dL (12-17-18 @ 13:16)                          12.7   15.1  )-----------( 145      ( 19 Dec 2018 09:23 )             36.4     12-19    131<L>  |  89<L>  |  39<H>  ----------------------------<  87  3.8   |  34<H>  |  1.11    Ca    9.1      19 Dec 2018 09:02      Hemoglobin A1C, Whole Blood: 7.2 % <H> [4.0 - 5.6] (11-30-18 @ 07:58)

## 2018-12-19 NOTE — PROGRESS NOTE ADULT - SUBJECTIVE AND OBJECTIVE BOX
[facility] - [facility]     PROGRESS NOTE    CHIEF COMPLAINT:  chronic hypoxic/hypercapnic respiratory failure; COPD exacerbation; emphysema; dyspnea    INTERVAL HISTORY: slept most of the night with BIPAP - ABG without acute respiratory acidosis or hypoxemia; barely able to get to the commode without profound shortness of breath and with marked leg weakness; no shortness of breath at rest on a nasal canula; resolved AURORA; improved hyponatremia; no cough, sputum production, chest congestion or wheeze; no fevers, chills or sweats; no chest pain/pressure or palpitations; persistent extremity swelling upper > lower; sputum cultures without growth x 2;     REVIEW OF SYSTEMS:  Constitutional: As per interval history  HEENT: Within normal limits  CV: As per interval history  Resp: As per interval history  GI: Within normal limits   : Within normal limits  Musculoskeletal: lower extremity weakness and pain  Skin: Within normal limits  Neurological: Within normal limits  Psychiatric: Within normal limits  Endocrine: Within normal limits  Hematologic/Lymphatic: Within normal limits  Allergic/Immunologic: Within normal limits      MEDICATIONS:     Pulmonary "  ALBUTerol/ipratropium for Nebulization 3 milliLiter(s) Nebulizer <User Schedule>  buDESOnide   0.5 milliGRAM(s) Respule 0.5 milliGRAM(s) Inhalation every 12 hours  montelukast 10 milliGRAM(s) Oral daily  theophylline ER Capsule 400 milliGRAM(s) Oral daily      Anti-microbials:  nystatin    Suspension [id number] Unit(s) Oral four times a day      Cardiovascular:  furosemide   Injectable 20 milliGRAM(s) IV Push daily  isosorbide   mononitrate ER Tablet (IMDUR) 30 milliGRAM(s) Oral daily      Other:  artificial tears (preservative free) Ophthalmic Solution 1 Drop(s) Both EYES three times a day  aspirin enteric coated 81 milliGRAM(s) Oral daily  Biotene Dry Mouth Oral Rinse 5 milliLiter(s) Swish and Spit two times a day  bisacodyl Suppository 10 milliGRAM(s) Rectal daily PRN  cholecalciferol 2000 Unit(s) Oral daily  dextrose 40% Gel 15 Gram(s) Oral once PRN  dextrose 5%. 1000 milliLiter(s) IV Continuous <Continuous>  dextrose 50% Injectable 12.5 Gram(s) IV Push once  dextrose 50% Injectable 25 Gram(s) IV Push once  dextrose 50% Injectable 25 Gram(s) IV Push once  docusate sodium 100 milliGRAM(s) Oral daily  enoxaparin Injectable 40 milliGRAM(s) SubCutaneous every 24 hours  glucagon  Injectable 1 milliGRAM(s) IntraMuscular once PRN  insulin glargine Injectable (LANTUS) 10 Unit(s) SubCutaneous at bedtime  insulin lispro (HumaLOG) corrective regimen sliding scale   SubCutaneous three times a day before meals  insulin lispro (HumaLOG) corrective regimen sliding scale   SubCutaneous at bedtime  insulin lispro Injectable (HumaLOG) 6 Unit(s) SubCutaneous before breakfast  insulin lispro Injectable (HumaLOG) 4 Unit(s) SubCutaneous before lunch  insulin lispro Injectable (HumaLOG) 4 Unit(s) SubCutaneous with dinner  melatonin 1 milliGRAM(s) Oral at bedtime  multivitamin 1 Tablet(s) Oral daily  ondansetron Injectable 4 milliGRAM(s) IV Push every 8 hours PRN  pantoprazole    Tablet 40 milliGRAM(s) Oral before breakfast  polyethylene glycol 3350 17 Gram(s) Oral daily  predniSONE   Tablet 40 milliGRAM(s) Oral <User Schedule>  Roflumilast (Daliresp) 250 MICROGram(s) 250 MICROGram(s) Oral daily  senna 2 Tablet(s) Oral at bedtime  sodium chloride 0.65% Nasal 1 Spray(s) Both Nostrils two times a day PRN        OBJECTIVE:    I&O's Detail    18 Dec 2018 07:01  -  19 Dec 2018 07:00  --------------------------------------------------------  IN:    Oral Fluid: 720 mL  Total IN: 720 mL    OUT:    Voided: 500 mL  Total OUT: 500 mL    Total NET: 220 mL    POCT Blood Glucose.: 75 mg/dL (19 Dec 2018 09:36)  POCT Blood Glucose.: 237 mg/dL (18 Dec 2018 22:11)  POCT Blood Glucose.: 153 mg/dL (18 Dec 2018 18:54)  POCT Blood Glucose.: 152 mg/dL (18 Dec 2018 17:24)  POCT Blood Glucose.: 127 mg/dL (18 Dec 2018 15:22)      PHYSICAL EXAM:       ICU Vital Signs Last 24 Hrs  T(C): 36.6 (19 Dec 2018 06:12), Max: 36.9 (18 Dec 2018 17:06)  T(F): 97.8 (19 Dec 2018 06:12), Max: 98.5 (18 Dec 2018 17:06)  HR: 84 (19 Dec 2018 07:02) (70 - 109)  BP: 121/79 (19 Dec 2018 06:12) (100/60 - 136/76)  BP(mean): --  ABP: --  ABP(mean): --  RR: 18 (19 Dec 2018 06:12) (18 - 22)  SpO2: 100% (19 Dec 2018 07:02) (94% - 100%) on 5lpm nasal canula    General: Awake. Alert. Cooperative. No distress. Appears stated age. Obese.   HEENT:  Atraumatic. Normocephalic. Anicteric. Normal oral mucosa. PERRL. EOMI.   Neck: Supple. Trachea midline. Thyroid without enlargement/tenderness/nodules. No carotid bruit. No JVD.	  Cardiovascular: Regular rate and rhythm. Distant S1 S2. No murmurs, rubs or gallops.  Respiratory: Respirations unlabored. Markedly decreased breath sounds throughout. Prolonged expiratory phase of respiration. No wheeze. No curvature.  Abdomen: Soft. Non-tender. Non-distended. No organomegaly. No masses. Normal bowel sounds. Obese.  Extremities: Warm to touch. No clubbing or cyanosis. Mild bilateral lower extremity edema up to the thigh. Moderate bilateral upper extremity swelling.  Pulses: Decreased lower extremity peripheral pulses.  Skin: No rashes or lesions. No ecchymoses. No cyanosis. Warm to touch.  Lymph Nodes: Cervical, supraclavicular and axillary nodes normal  Neurological: Motor and sensory examination equal and normal. A and O x 3  Psychiatry: Appropriate mood and affect.        LABS:                        13.1   12.5  )-----------( 129      ( 18 Dec 2018 12:58 )             38.3                         12.9   11.66 )-----------( 135      ( 17 Dec 2018 13:33 )             39.0     12-19    131<L>  |  89<L>  |  39<H>  ----------------------------<  87  3.8   |  34<H>  |  1.11    12-18    124<L>  |  87<L>  |  38<H>  ----------------------------<  166<H>  4.5   |  24  |  1.10    Ca      9.1      12-19    Ca      8.9      12-18    Phos    2.8     12-15    Phos    3.7     12-14      Mg       2.2     12-16    Mg       2.2     12-15    ABG - ( 18 Dec 2018 13:23 )  pH: 7.46  /  pCO2: 47    /  pO2: 88    / HCO3: 33    / Base Excess: 8.0   /  SaO2: 97        ABG - ( 16 Dec 2018 20:53 )  pH: 7.44  /  pCO2: 53    /  pO2: 173   / HCO3: 36    / Base Excess: 10.0  /  SaO2: 100       ABG - ( 13 Dec 2018 06:29 )  pH: 7.30  /  pCO2: 71    /  pO2: 182   / HCO3: 34    / Base Excess: 5.8   /  SaO2: 99        ABG - ( 13 Dec 2018 00:59 )  pH: 7.32  /  pCO2: 71    /  pO2: 75    / HCO3: 35    / Base Excess: 7.1   /  SaO2: 95        ABG - ( 11 Dec 2018 11:45 )  pH: 7.39  /  pCO2: 54    /  pO2: 98    / HCO3: 32    / Base Excess: 6.2   /  SaO2: 98        ABG - ( 09 Dec 2018 11:26 )  pH: 7.35  /  pCO2: 63    /  pO2: 145   / HCO3: 34    / Base Excess: 6.6   /  SaO2: 99      ABG - ( 09 Dec 2018 00:28 )  pH: 7.32  /  pCO2: 71    /  pO2: 124   / HCO3: 36    / Base Excess: 7.6   /  SaO2: 99        ABG - ( 08 Dec 2018 20:50 )  pH: 7.32  /  pCO2: 72    /  pO2: 80    / HCO3: 36    / Base Excess: 7.7   /  SaO2: 95        Serum Pro-Brain Natriuretic Peptide: 254 pg/mL (12-13 @ 14:00)    < from: Transthoracic Echocardiogram (12.07.18 @ 08:59) >    Patient name: GUY HARTMAN  YOB: 1960   Age: 58 (F)   MR#: [id number]  Study Date: 12/7/2018  Location: [id number]: [facility]  Study quality: Technically good  Referring Physician: Allen ingram MD  BloodPressure: 120/80 mmHg  Height: 163 cm  Weight: 88 kg  BSA: 1.9 m2  ------------------------------------------------------------------------  PROCEDURE: Transthoracic echocardiogram with 2-D, M-Mode  and complete spectral and color flow Doppler.  INDICATION: Other forms of dyspnea (R06.09)  ------------------------------------------------------------------------  Dimensions:    Normal Values:  LA:     3.6    2.0 - 4.0 cm  Ao:     2.9    2.0 - 3.8 cm  SEPTUM: 0.9    0.6 - 1.2 cm  PWT:    0.8    0.6- 1.1 cm  LVIDd:  4.9    3.0 - 5.6 cm  LVIDs:  2.9    1.8 - 4.0 cm  Derived variables:  LVMI: 73 g/m2  RWT: 0.32  Fractional short: 40 %  EF (Teicholtz): 71 %  Doppler Peak Velocity (m/sec): AoV=1.2  ------------------------------------------------------------------------  Observations:  Mitral Valve: Normal mitral valve.  Aortic Valve/Aorta: Normal trileaflet aortic valve. Peak  transaortic valve gradient equals 6 mm Hg, mean transaortic  valve gradient equals 3 mm Hg, aortic valve velocity time  integral equals 22 cm. Trace aortic regurgitation.  Aortic Root: 2.9 cm.  Left Atrium: Normal left atrium.  LA volume index = 18  cc/m2.  Left Ventricle: Normal left ventricular systolic function.  No segmental wall motion abnormalities. Normal left  ventricular internal dimensions and wall thicknesses. Mild  diastolic dysfunction (Stage I).  Right Heart: Normal right atrium. Normal right ventricular  size and function. Normal tricuspid valve. Minimal  tricuspid regurgitation. Normal pulmonic valve.  Pericardium/Pleura: Normal pericardium with no pericardial  effusion.  Hemodynamic: Estimated right atrial pressure is 8 mm Hg.  Inadequate tricuspid regurgitation Doppler envelope  precludes estimation of RVSP.  ------------------------------------------------------------------------  Conclusions:  1. Normal mitral valve.  2. Normal trileaflet aortic valve. Peak transaortic valve  gradient equals 6 mm Hg, mean transaortic valve gradient  equals 3 mm Hg, aortic valve velocity time integral equals  22 cm. Trace aortic regurgitation.  3. Aortic Root: 2.9 cm.  4. Normal left atrium.  LA volume index = 18 cc/m2.  5. Normal left ventricular internal dimensions and wall  thicknesses.  6. Normal left ventricular systolic function. No segmental  wall motion abnormalities.  7. Mild diastolic dysfunction (Stage I).  8. Normal right ventricular size and function.  9. Inadequate tricuspid regurgitation Doppler envelope  precludes estimation of RVSP.  10. Normal tricuspid valve. Minimal tricuspid  regurgitation.  *** Compared with echocardiogram report of 2/18/2014, no  significant changes noted.  ------------------------------------------------------------------------  Confirmed on  12/7/2018 - 13:49:43 by Shefali Gómez M.D.  ------------------------------------------------------------------------    < end of copied text >  ---------------------------------------------------------------------------------------------------------------    MICROBIOLOGY:     Culture - Sputum . (12.07.18 @ 08:34)    Gram Stain:   Rare polymorphonuclear leukocytes per low power field  Rare Squamous epithelial cells per low power field  Numerous Gram Positive Cocci in Pairs and Chains per oil power field    Specimen Source: .Sputum Sputum    Culture Results:   Normal Respiratory Samaria present    Culture - Sputum . (12.05.18 @ 22:50)    Gram Stain:   Moderate polymorphonuclear leukocytes per low power field  Few Squamous epithelial cells per low power field  Moderate Gram Positive Cocci in Pairs and Chains per oil power field    Specimen Source: .Sputum Sputum    Culture Results:   Normal Respiratory Samaria present    Rapid Respiratory Viral Panel (11.30.18 @ 01:30)    Rapid RVP Result: NotDete: The [employer] RVP Rapid uses polymerase chain reaction (PCR) and melt  curve analysis to screen for adenovirus; coronavirus HKU1, NL63, 229E,  OC43; human metapneumovirus (hMPV); human enterovirus/rhinovirus  (Entero/RV); influenza A; influenza A/H1;influenza A/H3; influenza  A/H1-2009; influenza B; parainfluenza viruses 1, 2, 3, 4; respiratory  syncytial virus; Bordetella pertussis; Mycoplasma pneumoniae; and  Chlamydophila pneumoniae.    RADIOLOGY:  [x] Chest radiographs reviewed and interpreted by me    < from: US Kidney and Bladder (12.17.18 @ 16:26) >    EXAM:  US KIDNEYS AND BLADDER                          PROCEDURE DATE:  12/17/2018      IMPRESSION:     Normal renal ultrasound.    MATT ZIMMER M.D., RADIOLOGY RESIDENT  This document has been electronically signed.  RAYMUNDO DUMONT M.D., ATTENDING RADIOLOGIST  This document has been electronically signed. Dec 17 2018  4:56PM    < end of copied text >  ---------------------------------------------------------------------------------------------------------------  < from: Xray Chest 1 View- PORTABLE-Routine (12.13.18 @ 13:10) >    EXAM:  XR CHEST PORTABLE ROUTINE 1V                          PROCEDURE DATE:  12/13/2018      INTERPRETATION:  HISTORY: Shortness of breath    TECHNIQUE: Portable frontal chest x-ray    COMPARISON: Chest x-ray from 11/29/2018    FINDINGS:    No focal consolidation.  There is no pneumothorax. There are no pleural effusions.   The cardiomediastinal silhouette cannot be adequately assessed on this   projection.    IMPRESSION:   Clear lungs.       JEANIE SPEARS M.D., RADIOLOGY RESIDENT  This document has been electronically signed.  JEYSON SANCHEZ M.D., ATTENDING RADIOLOGIST  This document has been electronically signed. Dec 13 2018  3:28PM      < end of copied text >  ---------------------------------------------------------------------------------------------------------------  < from: CT Angio Chest w/ IV Cont (12.04.18 @ 16:30) >    EXAM:  CT ANGIO CHEST (W)AW IC                          PROCEDURE DATE:  12/04/2018      INTERPRETATION:  CT CHEST WITH CONTRAST    INDICATION: Known COPD not responding to steroids and bronchodilators.   Progressively worsening dyspnea. Evaluate for pulmonary embolism.    TECHNIQUE: Enhanced helical images were obtained of the chest. Coronal   and sagittal images were reconstructed. Maximum intensity projection   images were generated. Images were obtained after the uneventful   administration of 60 cc nonionic intravenous Omnipaque 350.  40 cc of   Omnipaque 350 was discarded.    COMPARISON: CT chest 2/18/2014.    FINDINGS:     Lungs And Airways: Emphysematous changes of the lung.      The airways are unremarkable.      Pleura: No pneumothorax. No pleural effusion.    Mediastinum: There are no enlarged chest lymph nodes. The visualized   portion of the thyroid gland is unremarkable.       Heart and Vasculature: No pulmonary embolism.    The main pulmonary artery is normal in caliber. No thoracic aortic   aneurysm or dissection. Atheromatous disease of the aorta.    The heart is normal in size.  There is no pericardial effusion.   Coronary artery disease with calcified plaque involving the right and   left main coronary arteries and the left anterior descending artery.      Upper Abdomen: The upper abdomen is unremarkable.    Bones And Soft Tissues: Degenerative changes of the spine.  The soft   tissues are unremarkable.      IMPRESSION:   1.  No pulmonary embolism.  2. Emphysematous changes of the lung.    DIANA MEDINA M.D., RADIOLOGY RESIDENT  This document has been electronically signed.  JEYSON SANCHEZ M.D., ATTENDING RADIOLOGIST  This document has been electronically signed. Dec  4 2018  5:21PM      < end of copied text >  ---------------------------------------------------------------------------------------------------------------  < from: [facility] Duplex Lower Ext Vein Scan, Bilbeto (12.06.18 @ 15:40) >    EXAM:  DUPLEX SCAN EXT VEINS LOWER BI                          PROCEDURE DATE:  12/06/2018      INTERPRETATION:  CLINICAL INFORMATION: Shortness of breath, leg pain and   swelling.    COMPARISON: Bilateral lower extremity venous duplex study dated 1/27/2009.    TECHNIQUE: Duplex sonography of the BILATERAL LOWER extremities with   color and spectral Doppler, with and without compression.      FINDINGS:    There is normal compressibility of the bilateral common femoral, femoral   and popliteal veins. No calf vein thrombosis is detected.    Doppler examination shows normal spontaneous and phasic flow.    IMPRESSION:     No evidence of bilateral lower extremity deep venous thrombosis.    JUSTIN ZAVALETA M.D., ATTENDING RADIOLOGIST  This document has been electronically signed. Dec  6 2018  4:03PM    < end of copied text >  ---------------------------------------------------------------------------------------------------------------    < from: [facility] Duplex Upper Ext Vein Scan, Bilat (12.12.18 @ 17:36) >    EXAM:  DUPLEX SCAN EXT VEINS UPPER BI                          PROCEDURE DATE:  12/12/2018      IMPRESSION:     No evidence of BILATERAL upper extremity deep venous thrombosis.    SNEHA KELSEY M.D., RADIOLOGY RESIDENT  This document has been electronically signed.  RAYMUNDO DUMONT M.D., ATTENDING RADIOLOGIST  This document has been electronically signed. Dec 13 2018  9:22AM      < end of copied text >  ---------------------------------------------------------------------------------------------------------------  SPIROMETRY:     FEV1 0.56 liters - 22% predicted  FVC 1.73 liters - 52% predicted  FEV1% 32    c/w very severe obstructive lung disease

## 2018-12-19 NOTE — PROGRESS NOTE ADULT - SUBJECTIVE AND OBJECTIVE BOX
[facility] DIVISION OF KIDNEY DISEASES AND HYPERTENSION -- FOLLOW UP NOTE  --------------------------------------------------------------------------------  Chief Complaint:  AURORA, hyperkalemia    24 hour events/subjective:  Still has leg swelling. Eating as much as she can- restricting her fluid intake.       PAST HISTORY  --------------------------------------------------------------------------------  No significant changes to PMH, PSH, FHx, SHx, unless otherwise noted    ALLERGIES & MEDICATIONS  --------------------------------------------------------------------------------  Allergies    No Known Allergies    Intolerances      Standing Inpatient Medications  ALBUTerol/ipratropium for Nebulization 3 milliLiter(s) Nebulizer <User Schedule>  artificial tears (preservative free) Ophthalmic Solution 1 Drop(s) Both EYES three times a day  aspirin enteric coated 81 milliGRAM(s) Oral daily  Biotene Dry Mouth Oral Rinse 5 milliLiter(s) Swish and Spit two times a day  buDESOnide   0.5 milliGRAM(s) Respule 0.5 milliGRAM(s) Inhalation every 12 hours  cholecalciferol 2000 Unit(s) Oral daily  dextrose 5%. 1000 milliLiter(s) IV Continuous <Continuous>  dextrose 50% Injectable 12.5 Gram(s) IV Push once  dextrose 50% Injectable 25 Gram(s) IV Push once  dextrose 50% Injectable 25 Gram(s) IV Push once  docusate sodium 100 milliGRAM(s) Oral daily  enoxaparin Injectable 40 milliGRAM(s) SubCutaneous every 24 hours  furosemide   Injectable 20 milliGRAM(s) IV Push daily  insulin glargine Injectable (LANTUS) 7 Unit(s) SubCutaneous at bedtime  insulin lispro (HumaLOG) corrective regimen sliding scale   SubCutaneous three times a day before meals  insulin lispro (HumaLOG) corrective regimen sliding scale   SubCutaneous at bedtime  insulin lispro Injectable (HumaLOG) 6 Unit(s) SubCutaneous before breakfast  insulin lispro Injectable (HumaLOG) 4 Unit(s) SubCutaneous before lunch  insulin lispro Injectable (HumaLOG) 4 Unit(s) SubCutaneous with dinner  isosorbide   mononitrate ER Tablet (IMDUR) 30 milliGRAM(s) Oral daily  melatonin 1 milliGRAM(s) Oral at bedtime  montelukast 10 milliGRAM(s) Oral daily  multivitamin 1 Tablet(s) Oral daily  nystatin    Suspension [id number] Unit(s) Oral four times a day  pantoprazole    Tablet 40 milliGRAM(s) Oral before breakfast  polyethylene glycol 3350 17 Gram(s) Oral daily  Roflumilast (Daliresp) 250 MICROGram(s) 250 MICROGram(s) Oral daily  senna 2 Tablet(s) Oral at bedtime  theophylline ER Capsule 400 milliGRAM(s) Oral daily    PRN Inpatient Medications  bisacodyl Suppository 10 milliGRAM(s) Rectal daily PRN  dextrose 40% Gel 15 Gram(s) Oral once PRN  glucagon  Injectable 1 milliGRAM(s) IntraMuscular once PRN  ondansetron Injectable 4 milliGRAM(s) IV Push every 8 hours PRN  sodium chloride 0.65% Nasal 1 Spray(s) Both Nostrils two times a day PRN      REVIEW OF SYSTEMS  --------------------------------------------------------------------------------  Gen: + fatigue  Skin: No rashes  Respiratory: + dyspnea  CV: No chest pain  GI: No abdominal pain  MSK: + edema    VITALS/PHYSICAL EXAM  --------------------------------------------------------------------------------  T(C): 36.6 (12-19-18 @ 09:59), Max: 36.9 (12-18-18 @ 17:06)  HR: 88 (12-19-18 @ 10:02) (70 - 109)  BP: 118/70 (12-19-18 @ 09:59) (100/60 - 136/76)  RR: 19 (12-19-18 @ 10:02) (18 - 22)  SpO2: 100% (12-19-18 @ 10:02) (94% - 100%)  Wt(kg): --        12-18-18 @ 07:01  -  12-19-18 @ 07:00  --------------------------------------------------------  IN: 720 mL / OUT: 500 mL / NET: 220 mL      Physical Exam:  	Gen: NAD, +NC  	Pulm: CTA B/L  	CV: RRR, S1S2; no rub  	Abd: +BS, soft, nontender/nondistended, obese abdomen  	LE: Warm, b/l LE edema +1  	Neuro: follows commands  	Psych: Normal affect and mood  	Skin: Warm, without rashes    LABS/STUDIES  --------------------------------------------------------------------------------              12.7   15.1  >-----------<  145      [12-19-18 @ 09:23]              36.4     131  |  89  |  39  ----------------------------<  87      [12-19-18 @ 09:02]  3.8   |  34  |  1.11        Ca     9.1     [12-19-18 @ 09:02]            Creatinine Trend:  SCr 1.11 [12-19 @ 09:02]  SCr 1.10 [12-18 @ 12:58]  SCr 1.07 [12-17 @ 09:50]  SCr 0.90 [12-16 @ 10:19]  SCr 1.27 [12-15 @ 12:34]    Urinalysis - [12-13-18 @ 16:09]      Color Yellow / Appearance Clear / SG 1.022 / pH 5.5      Gluc Negative / Ketone Negative  / Bili Negative / Urobili Negative       Blood Negative / Protein Negative / Leuk Est Negative / Nitrite Negative      RBC  / WBC  / Hyaline  / Gran  / Sq Epi  / Non Sq Epi  / Bacteria     Urine Creatinine 76      [12-13-18 @ 14:06]  Urine Sodium 20      [12-13-18 @ 14:06]  Urine Potassium 50      [12-13-18 @ 14:06]    HbA1c 7.2      [11-30-18 @ 07:58]

## 2018-12-19 NOTE — PROGRESS NOTE ADULT - ASSESSMENT
58 F PMH COPD on home O2 3L, HTN, HLD, CAD s/p x6 stents, T2DM, pHTN, PVD, p/w progressive worsening dyspnea x 2 weeks now complicated by chronic progressive hypoxic respiratory failure.     1. Chronic progressive hypoxic respiratory failure  multifactorial in the setting of worsening COPD, CAD, chronic diastolic CHF, pulmonary HTN   pt. appears mildly fluid overloaded on exam with generalized edema however significantly improved   continue lasix 20mg IVP daily   echo with normal LV function, mild diastolic dysfunction, inadequate tricuspid regurg   cv stable no chest pain, no evidence of acute ischemia/ACS   bipap PRN   recommend repeat CXR to eval for pleural effusions   continue dueonebs, inhaled steroids, singulair/theophylline/daliresp, pulm f/u     2. CAD s/p PCI  cv stable  continue asa, imdur      3. COPD   remains on bipap  continue Duoneb inhaled steroids, singulair/theophylline/daliresp, pulm f/u     4. hyponatremia/hyperkalemia  trend bmp  renal f/u     dvt ppx

## 2018-12-19 NOTE — PROGRESS NOTE ADULT - SUBJECTIVE AND OBJECTIVE BOX
CARDIOLOGY FOLLOW UP - Dr. Brunson    CC no cp  sob unchanged  swelling improved     PHYSICAL EXAM:  T(C): 36.6 (12-19-18 @ 09:59), Max: 36.9 (12-18-18 @ 17:06)  HR: 88 (12-19-18 @ 10:02) (70 - 109)  BP: 118/70 (12-19-18 @ 09:59) (100/60 - 136/76)  RR: 19 (12-19-18 @ 10:02) (18 - 22)  SpO2: 100% (12-19-18 @ 10:02) (94% - 100%)  Wt(kg): --  I&O's Summary    18 Dec 2018 07:01  -  19 Dec 2018 07:00  --------------------------------------------------------  IN: 720 mL / OUT: 500 mL / NET: 220 mL        Appearance: Normal	  Cardiovascular: Normal S1 S2,RRR, No JVD, No murmurs  Respiratory: diminished   Gastrointestinal:  Soft, Non-tender, + BS	  Extremities: Normal range of motion, No clubbing, b/l upper, lower extremity edema, improved         MEDICATIONS  (STANDING):  ALBUTerol/ipratropium for Nebulization 3 milliLiter(s) Nebulizer <User Schedule>  artificial tears (preservative free) Ophthalmic Solution 1 Drop(s) Both EYES three times a day  aspirin enteric coated 81 milliGRAM(s) Oral daily  Biotene Dry Mouth Oral Rinse 5 milliLiter(s) Swish and Spit two times a day  buDESOnide   0.5 milliGRAM(s) Respule 0.5 milliGRAM(s) Inhalation every 12 hours  cholecalciferol 2000 Unit(s) Oral daily  dextrose 5%. 1000 milliLiter(s) (50 mL/Hr) IV Continuous <Continuous>  dextrose 50% Injectable 12.5 Gram(s) IV Push once  dextrose 50% Injectable 25 Gram(s) IV Push once  dextrose 50% Injectable 25 Gram(s) IV Push once  docusate sodium 100 milliGRAM(s) Oral daily  enoxaparin Injectable 40 milliGRAM(s) SubCutaneous every 24 hours  furosemide   Injectable 20 milliGRAM(s) IV Push daily  insulin glargine Injectable (LANTUS) 7 Unit(s) SubCutaneous at bedtime  insulin lispro (HumaLOG) corrective regimen sliding scale   SubCutaneous three times a day before meals  insulin lispro (HumaLOG) corrective regimen sliding scale   SubCutaneous at bedtime  insulin lispro Injectable (HumaLOG) 6 Unit(s) SubCutaneous before breakfast  insulin lispro Injectable (HumaLOG) 4 Unit(s) SubCutaneous before lunch  insulin lispro Injectable (HumaLOG) 4 Unit(s) SubCutaneous with dinner  isosorbide   mononitrate ER Tablet (IMDUR) 30 milliGRAM(s) Oral daily  melatonin 1 milliGRAM(s) Oral at bedtime  montelukast 10 milliGRAM(s) Oral daily  multivitamin 1 Tablet(s) Oral daily  nystatin    Suspension 377910 Unit(s) Oral four times a day  pantoprazole    Tablet 40 milliGRAM(s) Oral before breakfast  polyethylene glycol 3350 17 Gram(s) Oral daily  Roflumilast (Daliresp) 250 MICROGram(s) 250 MICROGram(s) Oral daily  senna 2 Tablet(s) Oral at bedtime  theophylline ER Capsule 400 milliGRAM(s) Oral daily      TELEMETRY: 	    ECG:  	  RADIOLOGY:   DIAGNOSTIC TESTING:  [ ] Echocardiogram:  [ ]  Catheterization:  [ ] Stress Test:    OTHER: 	    LABS:	 	                                12.7   15.1  )-----------( 145      ( 19 Dec 2018 09:23 )             36.4     12-19    131<L>  |  89<L>  |  39<H>  ----------------------------<  87  3.8   |  34<H>  |  1.11    Ca    9.1      19 Dec 2018 09:02

## 2018-12-19 NOTE — PROGRESS NOTE ADULT - ASSESSMENT
59 y/o F w/h/o controlled T2DM on Metformin 500mg bid. Also h/o CAD and COPD. Here with COPD exacerbation now on Prednisone taper going down to 30mg starting tomorrow. No further hypoglycemia after staroid dose changed to morning time. Still need to decrease insulin doses due to drop on BG in am and coming down on steroid dose. Appreciated RD follow up consult. Respiratory function the same as per pt.

## 2018-12-19 NOTE — PROGRESS NOTE ADULT - ASSESSMENT
58yoF w/ advanced COPD on home O2 3L (being evaluated at [facility] for lung transplant), HTN, HLD, CAD s/p 6 stents, DMII on metformin, PVD, who p/w progressive worsening dyspnea x 3 weeks c/w COPD exacerbation. Renal called for persistent hyperkalemia.

## 2018-12-19 NOTE — PROGRESS NOTE ADULT - PROBLEM SELECTOR PLAN 2
possibly from volume overload. Baseline sCr 1-1.3. UA bland. UARORA now resolved.  - Monitor BMP  - Dose meds renally

## 2018-12-19 NOTE — PROGRESS NOTE ADULT - SUBJECTIVE AND OBJECTIVE BOX
Patient is a 58y old  Female who presents with a chief complaint of dyspnea (18 Dec 2018 15:11)      SUBJECTIVE / OVERNIGHT EVENTS:    MEDICATIONS  (STANDING):  ALBUTerol/ipratropium for Nebulization 3 milliLiter(s) Nebulizer <User Schedule>  artificial tears (preservative free) Ophthalmic Solution 1 Drop(s) Both EYES three times a day  aspirin enteric coated 81 milliGRAM(s) Oral daily  Biotene Dry Mouth Oral Rinse 5 milliLiter(s) Swish and Spit two times a day  buDESOnide   0.5 milliGRAM(s) Respule 0.5 milliGRAM(s) Inhalation every 12 hours  cholecalciferol 2000 Unit(s) Oral daily  dextrose 5%. 1000 milliLiter(s) (50 mL/Hr) IV Continuous <Continuous>  dextrose 50% Injectable 12.5 Gram(s) IV Push once  dextrose 50% Injectable 25 Gram(s) IV Push once  dextrose 50% Injectable 25 Gram(s) IV Push once  docusate sodium 100 milliGRAM(s) Oral daily  enoxaparin Injectable 40 milliGRAM(s) SubCutaneous every 24 hours  furosemide   Injectable 20 milliGRAM(s) IV Push daily  insulin glargine Injectable (LANTUS) 7 Unit(s) SubCutaneous at bedtime  insulin lispro (HumaLOG) corrective regimen sliding scale   SubCutaneous three times a day before meals  insulin lispro (HumaLOG) corrective regimen sliding scale   SubCutaneous at bedtime  insulin lispro Injectable (HumaLOG) 6 Unit(s) SubCutaneous before breakfast  insulin lispro Injectable (HumaLOG) 4 Unit(s) SubCutaneous before lunch  insulin lispro Injectable (HumaLOG) 4 Unit(s) SubCutaneous with dinner  isosorbide   mononitrate ER Tablet (IMDUR) 30 milliGRAM(s) Oral daily  melatonin 1 milliGRAM(s) Oral at bedtime  montelukast 10 milliGRAM(s) Oral daily  multivitamin 1 Tablet(s) Oral daily  nystatin    Suspension 989960 Unit(s) Oral four times a day  pantoprazole    Tablet 40 milliGRAM(s) Oral before breakfast  polyethylene glycol 3350 17 Gram(s) Oral daily  Roflumilast (Daliresp) 250 MICROGram(s) 250 MICROGram(s) Oral daily  senna 2 Tablet(s) Oral at bedtime  theophylline ER Capsule 400 milliGRAM(s) Oral daily    MEDICATIONS  (PRN):  bisacodyl Suppository 10 milliGRAM(s) Rectal daily PRN Constipation  dextrose 40% Gel 15 Gram(s) Oral once PRN Blood Glucose LESS THAN 70 milliGRAM(s)/deciliter  glucagon  Injectable 1 milliGRAM(s) IntraMuscular once PRN Glucose LESS THAN 70 milligrams/deciliter  ondansetron Injectable 4 milliGRAM(s) IV Push every 8 hours PRN Nausea and/or Vomiting  sodium chloride 0.65% Nasal 1 Spray(s) Both Nostrils two times a day PRN Nasal Congestion      Vital Signs Last 24 Hrs  T(C): 36.6 (19 Dec 2018 09:59), Max: 36.9 (18 Dec 2018 17:06)  T(F): 97.8 (19 Dec 2018 09:59), Max: 98.5 (18 Dec 2018 17:06)  HR: 88 (19 Dec 2018 10:02) (70 - 109)  BP: 118/70 (19 Dec 2018 09:59) (100/60 - 136/76)  BP(mean): --  RR: 19 (19 Dec 2018 10:02) (18 - 22)  SpO2: 100% (19 Dec 2018 10:02) (94% - 100%)  CAPILLARY BLOOD GLUCOSE      POCT Blood Glucose.: 75 mg/dL (19 Dec 2018 09:36)  POCT Blood Glucose.: 237 mg/dL (18 Dec 2018 22:11)  POCT Blood Glucose.: 153 mg/dL (18 Dec 2018 18:54)  POCT Blood Glucose.: 152 mg/dL (18 Dec 2018 17:24)  POCT Blood Glucose.: 127 mg/dL (18 Dec 2018 15:22)    I&O's Summary    18 Dec 2018 07:01  -  19 Dec 2018 07:00  --------------------------------------------------------  IN: 720 mL / OUT: 500 mL / NET: 220 mL        PHYSICAL EXAM:  GENERAL: NAD, well-developed  HEAD:  Atraumatic, Normocephalic  EYES: EOMI, PERRLA, conjunctiva and sclera clear  NECK: Supple, No JVD  CHEST/LUNG: Clear to auscultation bilaterally; No wheeze  HEART: Regular rate and rhythm; No murmurs, rubs, or gallops  ABDOMEN: Soft, Nontender, Nondistended; Bowel sounds present  EXTREMITIES:  2+ Peripheral Pulses, No clubbing, cyanosis, or edema  PSYCH: AAOx3  NEUROLOGY: non-focal  SKIN: No rashes or lesions    LABS:                        12.7   15.1  )-----------( 145      ( 19 Dec 2018 09:23 )             36.4     12-19    131<L>  |  89<L>  |  39<H>  ----------------------------<  87  3.8   |  34<H>  |  1.11    Ca    9.1      19 Dec 2018 09:02                RADIOLOGY & ADDITIONAL TESTS:    Imaging Personally Reviewed:    Consultant(s) Notes Reviewed:      Care Discussed with Consultants/Other Providers: Patient is a 58y old  Female who presents with a chief complaint of dyspnea (18 Dec 2018 15:11)      SUBJECTIVE / OVERNIGHT EVENTS: Pt seen and examined. No acute events overnight. She reports no change in dyspnea.   Seen by Pulmonary, Cardiology ; reccs noted.     MEDICATIONS  (STANDING):  ALBUTerol/ipratropium for Nebulization 3 milliLiter(s) Nebulizer <User Schedule>  artificial tears (preservative free) Ophthalmic Solution 1 Drop(s) Both EYES three times a day  aspirin enteric coated 81 milliGRAM(s) Oral daily  Biotene Dry Mouth Oral Rinse 5 milliLiter(s) Swish and Spit two times a day  buDESOnide   0.5 milliGRAM(s) Respule 0.5 milliGRAM(s) Inhalation every 12 hours  cholecalciferol 2000 Unit(s) Oral daily  dextrose 5%. 1000 milliLiter(s) (50 mL/Hr) IV Continuous <Continuous>  dextrose 50% Injectable 12.5 Gram(s) IV Push once  dextrose 50% Injectable 25 Gram(s) IV Push once  dextrose 50% Injectable 25 Gram(s) IV Push once  docusate sodium 100 milliGRAM(s) Oral daily  enoxaparin Injectable 40 milliGRAM(s) SubCutaneous every 24 hours  furosemide   Injectable 20 milliGRAM(s) IV Push daily  insulin glargine Injectable (LANTUS) 7 Unit(s) SubCutaneous at bedtime  insulin lispro (HumaLOG) corrective regimen sliding scale   SubCutaneous three times a day before meals  insulin lispro (HumaLOG) corrective regimen sliding scale   SubCutaneous at bedtime  insulin lispro Injectable (HumaLOG) 6 Unit(s) SubCutaneous before breakfast  insulin lispro Injectable (HumaLOG) 4 Unit(s) SubCutaneous before lunch  insulin lispro Injectable (HumaLOG) 4 Unit(s) SubCutaneous with dinner  isosorbide   mononitrate ER Tablet (IMDUR) 30 milliGRAM(s) Oral daily  melatonin 1 milliGRAM(s) Oral at bedtime  montelukast 10 milliGRAM(s) Oral daily  multivitamin 1 Tablet(s) Oral daily  nystatin    Suspension 780996 Unit(s) Oral four times a day  pantoprazole    Tablet 40 milliGRAM(s) Oral before breakfast  polyethylene glycol 3350 17 Gram(s) Oral daily  Roflumilast (Daliresp) 250 MICROGram(s) 250 MICROGram(s) Oral daily  senna 2 Tablet(s) Oral at bedtime  theophylline ER Capsule 400 milliGRAM(s) Oral daily    MEDICATIONS  (PRN):  bisacodyl Suppository 10 milliGRAM(s) Rectal daily PRN Constipation  dextrose 40% Gel 15 Gram(s) Oral once PRN Blood Glucose LESS THAN 70 milliGRAM(s)/deciliter  glucagon  Injectable 1 milliGRAM(s) IntraMuscular once PRN Glucose LESS THAN 70 milligrams/deciliter  ondansetron Injectable 4 milliGRAM(s) IV Push every 8 hours PRN Nausea and/or Vomiting  sodium chloride 0.65% Nasal 1 Spray(s) Both Nostrils two times a day PRN Nasal Congestion      Vital Signs Last 24 Hrs  T(C): 36.6 (19 Dec 2018 09:59), Max: 36.9 (18 Dec 2018 17:06)  T(F): 97.8 (19 Dec 2018 09:59), Max: 98.5 (18 Dec 2018 17:06)  HR: 88 (19 Dec 2018 10:02) (70 - 109)  BP: 118/70 (19 Dec 2018 09:59) (100/60 - 136/76)  BP(mean): --  RR: 19 (19 Dec 2018 10:02) (18 - 22)  SpO2: 100% (19 Dec 2018 10:02) (94% - 100%)  CAPILLARY BLOOD GLUCOSE      POCT Blood Glucose.: 75 mg/dL (19 Dec 2018 09:36)  POCT Blood Glucose.: 237 mg/dL (18 Dec 2018 22:11)  POCT Blood Glucose.: 153 mg/dL (18 Dec 2018 18:54)  POCT Blood Glucose.: 152 mg/dL (18 Dec 2018 17:24)  POCT Blood Glucose.: 127 mg/dL (18 Dec 2018 15:22)    I&O's Summary    18 Dec 2018 07:01  -  19 Dec 2018 07:00  --------------------------------------------------------  IN: 720 mL / OUT: 500 mL / NET: 220 mL        PHYSICAL EXAM:  GENERAL: NAD, anicteric, afebrile  HEAD:  Atraumatic, Normocephalic  EYES: EOMI, PERRLA, conjunctiva and sclera clear  NECK: Supple, No JVD  CHEST/LUNG: Clear to auscultation bilaterally; No wheeze  HEART: Regular rate and rhythm; No murmurs, rubs, or gallops  ABDOMEN: Soft, Nontender, Nondistended; Bowel sounds present  EXTREMITIES:  2+ Peripheral Pulses, b/l UE/LE edema  PSYCH: AAOx3  NEUROLOGY: non-focal  SKIN: No rashes or lesions      LABS:                        12.7   15.1  )-----------( 145      ( 19 Dec 2018 09:23 )             36.4     12-19    131<L>  |  89<L>  |  39<H>  ----------------------------<  87  3.8   |  34<H>  |  1.11    Ca    9.1      19 Dec 2018 09:02                  Consultant(s) Notes Reviewed:  Cardiology, Pulmonary, Nephrology    Care Discussed with Consultants/Other Providers: Pulmonary

## 2018-12-19 NOTE — PROGRESS NOTE ADULT - ASSESSMENT
ASSESSMENT:    ongoing dyspnea with minimal exertion with chronic hypoxic and hypercapnic respiratory failure due to very severe COPD with "exacerbation" - adequate oxygenation on a nasal canula in the setting of profound dyspnea suggests that alterations in the mechanics of breathing due to lung hyperinflation are playing a significant role in shortness of breath - there is evidence of CAD without pulmonary hypertension on the CT scan which is without pulmonary emboli or pneumonia - cardiac catheterization last year revealed patent stents - ECHO has a preserved LVEF, no significant valvular heart disease and no pulmonary hypertension - resolved AURORA and hyperkalemia - hyponatremia - resolved respiratory acidosis with BIPAP support - lower extremity weakness and pain 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 perhaps exacerbated by statin use    HTN/HLD/DM    CAD s/p PCI    PAD    extremity swelling likely related to steroid induced fluid retention and nocturnal hypoxemia with worsening of elevated pulmonary artery pressures - no evidence of DVT on upper or lower extremity Duplex examination    PLAN/RECOMMENDATIONS:    BIPAP 12/5 with 40% FiO2 for sleep - on and off during the day for increased work of breathing or recurrent respiratory acidosis - i have given the patient a nasal pillow interface to use with the hospital BIPAP machine  oxygen supplementation to keep saturation greater than 92% using a nasal canula when off BIPAP  following ABG  sputum culture - no growth - has completed a course of biaxin  home trilogy vent has been arranged with [facility]   albuterol/atrovent nebs q4h holding 2AM dose  pulmicort 0.5mg nebs q12h  singulair/theophylline/daliresp - check theophylline level    decrease prednisone to 30mg daily - glucose control - nystatin  cardiology evaluation noted -  confirming with them that adding azithromycin for its anti-inflammatory effects is not contraindicated by her history of CAD  cardiac meds: ASA/imdur - off crestor with possible myopathy - off norvasc which is perhaps exacerbating swelling - lasix - BP control  diurese as tolerated by renal function and hemodynamics - watch for worsening metabolic alkalosis with loop diuretic use which could lead to worsening hypercapnia  DVT prophylaxis - SQ lovenox  physical therapy as able  renal and endocrine evaluation noted  bowel regimen  outpatient pulmonary rehabilitation  outpatient evaluation for lung transplantation    Will follow with you. Plan of care discussed with the patient at bedside and the medical team. [facility]    David Fair MD, [facility] - [phone]  Pulmonary Medicine

## 2018-12-19 NOTE — PROGRESS NOTE ADULT - PROBLEM SELECTOR PLAN 3
A1C 7.2, but with recent hypoglycemic episodes   - Endocrine reccs appreciated  -c/w Lantus 6 units QHS  -c/w Humalog 8 units TID w/meals (hold if not eating)  -c/w Humalog moderate correction scale AC and Mod HS scale  Continue to monitor blood sugars. A1C 7.2, but with recent hypoglycemic episodes ; Lantus decreased to 6U qhs 2 days ago ; AM FS now uptrending  -increase Lantus to 7 units QHS  -c/w Humalog 8 units TID w/meals (hold if not eating)  - Humalog moderate correction scale AC and Mod HS scale  - Continue to monitor blood sugars.  - Endocrine following

## 2018-12-19 NOTE — PROGRESS NOTE ADULT - PROBLEM SELECTOR PLAN 8
Echo with  mild diastolic dysfunction, no significant changes from prior study in 2014.  Restart IV Lasix 20mg IVP qd per Cardiology reccs

## 2018-12-19 NOTE — PROGRESS NOTE ADULT - PROBLEM SELECTOR PLAN 3
in setting of volume overload. Na low to 124 yesterday, now improved to 131.  - Fluid restriction- discussed w/ pt.   - C/w diuresis w/ lasix.  - Repeat Na later today to ensure not rapidly uptrending.

## 2018-12-20 LAB
ANION GAP SERPL CALC-SCNC: 11 MMOL/L — SIGNIFICANT CHANGE UP (ref 5–17)
BASOPHILS # BLD AUTO: 0 K/UL — SIGNIFICANT CHANGE UP (ref 0–0.2)
BASOPHILS NFR BLD AUTO: 0.1 % — SIGNIFICANT CHANGE UP (ref 0–2)
BUN SERPL-MCNC: 36 MG/DL — HIGH (ref 7–23)
CALCIUM SERPL-MCNC: 8.9 MG/DL — SIGNIFICANT CHANGE UP (ref 8.4–10.5)
CHLORIDE SERPL-SCNC: 87 MMOL/L — LOW (ref 96–108)
CO2 SERPL-SCNC: 29 MMOL/L — SIGNIFICANT CHANGE UP (ref 22–31)
CREAT SERPL-MCNC: 0.84 MG/DL — SIGNIFICANT CHANGE UP (ref 0.5–1.3)
EOSINOPHIL # BLD AUTO: 0 K/UL — SIGNIFICANT CHANGE UP (ref 0–0.5)
EOSINOPHIL NFR BLD AUTO: 0 % — SIGNIFICANT CHANGE UP (ref 0–6)
GLUCOSE BLDC GLUCOMTR-MCNC: 111 MG/DL — HIGH (ref 70–99)
GLUCOSE BLDC GLUCOMTR-MCNC: 114 MG/DL — HIGH (ref 70–99)
GLUCOSE BLDC GLUCOMTR-MCNC: 262 MG/DL — HIGH (ref 70–99)
GLUCOSE BLDC GLUCOMTR-MCNC: 306 MG/DL — HIGH (ref 70–99)
GLUCOSE BLDC GLUCOMTR-MCNC: 319 MG/DL — HIGH (ref 70–99)
GLUCOSE SERPL-MCNC: 111 MG/DL — HIGH (ref 70–99)
HCT VFR BLD CALC: 35 % — SIGNIFICANT CHANGE UP (ref 34.5–45)
HGB BLD-MCNC: 12.3 G/DL — SIGNIFICANT CHANGE UP (ref 11.5–15.5)
LYMPHOCYTES # BLD AUTO: 0.6 K/UL — LOW (ref 1–3.3)
LYMPHOCYTES # BLD AUTO: 4.3 % — LOW (ref 13–44)
MCHC RBC-ENTMCNC: 29.9 PG — SIGNIFICANT CHANGE UP (ref 27–34)
MCHC RBC-ENTMCNC: 35.1 GM/DL — SIGNIFICANT CHANGE UP (ref 32–36)
MCV RBC AUTO: 85.3 FL — SIGNIFICANT CHANGE UP (ref 80–100)
MONOCYTES # BLD AUTO: 0.2 K/UL — SIGNIFICANT CHANGE UP (ref 0–0.9)
MONOCYTES NFR BLD AUTO: 1.8 % — LOW (ref 2–14)
NEUTROPHILS # BLD AUTO: 12.3 K/UL — HIGH (ref 1.8–7.4)
NEUTROPHILS NFR BLD AUTO: 93.9 % — HIGH (ref 43–77)
PLAT MORPH BLD: NORMAL — SIGNIFICANT CHANGE UP
PLATELET # BLD AUTO: 127 K/UL — LOW (ref 150–400)
POTASSIUM SERPL-MCNC: 4.8 MMOL/L — SIGNIFICANT CHANGE UP (ref 3.5–5.3)
POTASSIUM SERPL-SCNC: 4.8 MMOL/L — SIGNIFICANT CHANGE UP (ref 3.5–5.3)
RBC # BLD: 4.11 M/UL — SIGNIFICANT CHANGE UP (ref 3.8–5.2)
RBC # FLD: 12.7 % — SIGNIFICANT CHANGE UP (ref 10.3–14.5)
RBC BLD AUTO: SIGNIFICANT CHANGE UP
SODIUM SERPL-SCNC: 127 MMOL/L — LOW (ref 135–145)
WBC # BLD: 13.1 K/UL — HIGH (ref 3.8–10.5)
WBC # FLD AUTO: 13.1 K/UL — HIGH (ref 3.8–10.5)

## 2018-12-20 PROCEDURE — 99232 SBSQ HOSP IP/OBS MODERATE 35: CPT

## 2018-12-20 PROCEDURE — 99233 SBSQ HOSP IP/OBS HIGH 50: CPT

## 2018-12-20 RX ADMIN — Medication 2: at 22:34

## 2018-12-20 RX ADMIN — POLYETHYLENE GLYCOL 3350 17 GRAM(S): 17 POWDER, FOR SOLUTION ORAL at 11:14

## 2018-12-20 RX ADMIN — MONTELUKAST 10 MILLIGRAM(S): 4 TABLET, CHEWABLE ORAL at 11:13

## 2018-12-20 RX ADMIN — Medication 3 MILLILITER(S): at 17:02

## 2018-12-20 RX ADMIN — Medication 1 TABLET(S): at 11:13

## 2018-12-20 RX ADMIN — Medication 3 MILLILITER(S): at 13:18

## 2018-12-20 RX ADMIN — Medication 81 MILLIGRAM(S): at 11:13

## 2018-12-20 RX ADMIN — Medication 500000 UNIT(S): at 17:02

## 2018-12-20 RX ADMIN — Medication 100 MILLIGRAM(S): at 11:13

## 2018-12-20 RX ADMIN — Medication 3 MILLILITER(S): at 05:33

## 2018-12-20 RX ADMIN — Medication 20 MILLIGRAM(S): at 05:33

## 2018-12-20 RX ADMIN — Medication 4 UNIT(S): at 08:50

## 2018-12-20 RX ADMIN — Medication 1 APPLICATION(S): at 17:02

## 2018-12-20 RX ADMIN — ONDANSETRON 4 MILLIGRAM(S): 8 TABLET, FILM COATED ORAL at 14:18

## 2018-12-20 RX ADMIN — Medication 3 MILLILITER(S): at 08:59

## 2018-12-20 RX ADMIN — Medication 0.5 MILLIGRAM(S): at 05:35

## 2018-12-20 RX ADMIN — Medication 1 DROP(S): at 13:18

## 2018-12-20 RX ADMIN — Medication 1 MILLIGRAM(S): at 22:36

## 2018-12-20 RX ADMIN — Medication 1 APPLICATION(S): at 05:47

## 2018-12-20 RX ADMIN — Medication 2 UNIT(S): at 12:53

## 2018-12-20 RX ADMIN — Medication 0.5 MILLIGRAM(S): at 16:37

## 2018-12-20 RX ADMIN — Medication 500000 UNIT(S): at 22:36

## 2018-12-20 RX ADMIN — Medication 3 MILLILITER(S): at 22:35

## 2018-12-20 RX ADMIN — PANTOPRAZOLE SODIUM 40 MILLIGRAM(S): 20 TABLET, DELAYED RELEASE ORAL at 05:36

## 2018-12-20 RX ADMIN — INSULIN GLARGINE 7 UNIT(S): 100 INJECTION, SOLUTION SUBCUTANEOUS at 22:35

## 2018-12-20 RX ADMIN — Medication 500000 UNIT(S): at 05:35

## 2018-12-20 RX ADMIN — Medication 2000 UNIT(S): at 11:13

## 2018-12-20 RX ADMIN — Medication 4: at 18:58

## 2018-12-20 RX ADMIN — Medication 1 DROP(S): at 05:34

## 2018-12-20 RX ADMIN — ISOSORBIDE MONONITRATE 30 MILLIGRAM(S): 60 TABLET, EXTENDED RELEASE ORAL at 11:13

## 2018-12-20 RX ADMIN — Medication 500000 UNIT(S): at 11:13

## 2018-12-20 RX ADMIN — ENOXAPARIN SODIUM 40 MILLIGRAM(S): 100 INJECTION SUBCUTANEOUS at 22:36

## 2018-12-20 RX ADMIN — Medication 30 MILLIGRAM(S): at 08:47

## 2018-12-20 RX ADMIN — SENNA PLUS 2 TABLET(S): 8.6 TABLET ORAL at 22:36

## 2018-12-20 RX ADMIN — Medication 400 MILLIGRAM(S): at 08:49

## 2018-12-20 RX ADMIN — Medication 1 DROP(S): at 22:35

## 2018-12-20 NOTE — PROGRESS NOTE ADULT - SUBJECTIVE AND OBJECTIVE BOX
CARDIOLOGY FOLLOW UP - Dr. Brunson    CC no cp  sob unchanged, LE edema improving       PHYSICAL EXAM:  T(C): 36.6 (12-20-18 @ 04:27), Max: 36.6 (12-19-18 @ 16:39)  HR: 103 (12-20-18 @ 14:08) (85 - 103)  BP: 147/78 (12-20-18 @ 11:31) (109/72 - 147/78)  RR: 20 (12-20-18 @ 11:31) (18 - 20)  SpO2: 100% (12-20-18 @ 14:08) (99% - 100%)  Wt(kg): --  I&O's Summary    19 Dec 2018 07:01  -  20 Dec 2018 07:00  --------------------------------------------------------  IN: 720 mL / OUT: 1300 mL / NET: -580 mL    20 Dec 2018 07:01  -  20 Dec 2018 15:42  --------------------------------------------------------  IN: 640 mL / OUT: 0 mL / NET: 640 mL        Appearance: Normal	  Cardiovascular: Normal S1 S2,RRR, No JVD, No murmurs  Respiratory: Lungs clear to auscultation	  Gastrointestinal:  Soft, Non-tender, + BS	  Extremities: Normal range of motion, No clubbing, b/l +2-3 LE edema,         MEDICATIONS  (STANDING):  ALBUTerol/ipratropium for Nebulization 3 milliLiter(s) Nebulizer <User Schedule>  artificial tears (preservative free) Ophthalmic Solution 1 Drop(s) Both EYES three times a day  aspirin enteric coated 81 milliGRAM(s) Oral daily  BACItracin   Ointment 1 Application(s) Topical two times a day  Biotene Dry Mouth Oral Rinse 5 milliLiter(s) Swish and Spit two times a day  buDESOnide   0.5 milliGRAM(s) Respule 0.5 milliGRAM(s) Inhalation every 12 hours  cholecalciferol 2000 Unit(s) Oral daily  dextrose 5%. 1000 milliLiter(s) (50 mL/Hr) IV Continuous <Continuous>  dextrose 50% Injectable 12.5 Gram(s) IV Push once  dextrose 50% Injectable 25 Gram(s) IV Push once  dextrose 50% Injectable 25 Gram(s) IV Push once  docusate sodium 100 milliGRAM(s) Oral daily  enoxaparin Injectable 40 milliGRAM(s) SubCutaneous every 24 hours  furosemide   Injectable 20 milliGRAM(s) IV Push daily  insulin glargine Injectable (LANTUS) 7 Unit(s) SubCutaneous at bedtime  insulin lispro (HumaLOG) corrective regimen sliding scale   SubCutaneous three times a day before meals  insulin lispro (HumaLOG) corrective regimen sliding scale   SubCutaneous at bedtime  insulin lispro Injectable (HumaLOG) 4 Unit(s) SubCutaneous before breakfast  insulin lispro Injectable (HumaLOG) 2 Unit(s) SubCutaneous before lunch  insulin lispro Injectable (HumaLOG) 2 Unit(s) SubCutaneous with dinner  isosorbide   mononitrate ER Tablet (IMDUR) 30 milliGRAM(s) Oral daily  melatonin 1 milliGRAM(s) Oral at bedtime  montelukast 10 milliGRAM(s) Oral daily  multivitamin 1 Tablet(s) Oral daily  nystatin    Suspension 463528 Unit(s) Oral four times a day  pantoprazole    Tablet 40 milliGRAM(s) Oral before breakfast  polyethylene glycol 3350 17 Gram(s) Oral daily  predniSONE   Tablet 30 milliGRAM(s) Oral <User Schedule>  Roflumilast (Daliresp) 250 MICROGram(s) 250 MICROGram(s) Oral daily  senna 2 Tablet(s) Oral at bedtime  theophylline ER Capsule 400 milliGRAM(s) Oral daily      TELEMETRY: 	    ECG:  	  RADIOLOGY:   DIAGNOSTIC TESTING:  [ ] Echocardiogram:  [ ]  Catheterization:  [ ] Stress Test:    OTHER: 	    LABS:	 	                                12.3   13.1  )-----------( 127      ( 20 Dec 2018 14:35 )             35.0     12-20    127<L>  |  87<L>  |  36<H>  ----------------------------<  111<H>  4.8   |  29  |  0.84    Ca    8.9      20 Dec 2018 14:34

## 2018-12-20 NOTE — PROGRESS NOTE ADULT - ASSESSMENT
57 y/o F w/h/o controlled T2DM on Metformin 500mg bid. Also h/o CAD and COPD. Here with COPD exacerbation now on Prednisone taper going down to 30mg today.  Patient with poor appetite yesterday the whole day, but for this morning did eat her breakfast and lunch.  Glucose mostly in range.

## 2018-12-20 NOTE — PROGRESS NOTE ADULT - PROBLEM SELECTOR PLAN 1
-test BG AC/HS  -Continue with Lantus dose to 7 units QHS  -Adjust Humalog 4-2-2 units TID w/meals (hold if not eating)  -c/w Humalog correction scale AC and HS scale to low dose for now  -Please notify endocrine when steroids further tapered so we can adjust the does of insulin.

## 2018-12-20 NOTE — PROGRESS NOTE ADULT - SUBJECTIVE AND OBJECTIVE BOX
Contact info:   Dony Metz MD  pager [phone], please provide 10 digit call back number.   Please note that this patient may be followed by another provider tomorrow.   If no answer or after hours, please contact [phone]    Chief Complaint: COPD exacerbation     History: Patient is sitting in chair, working on computer with bipap on.  States that she's stable.  Did eat her breakfast and lunch today.      MEDICATIONS  (STANDING):  ALBUTerol/ipratropium for Nebulization 3 milliLiter(s) Nebulizer <User Schedule>  artificial tears (preservative free) Ophthalmic Solution 1 Drop(s) Both EYES three times a day  aspirin enteric coated 81 milliGRAM(s) Oral daily  BACItracin   Ointment 1 Application(s) Topical two times a day  Biotene Dry Mouth Oral Rinse 5 milliLiter(s) Swish and Spit two times a day  buDESOnide   0.5 milliGRAM(s) Respule 0.5 milliGRAM(s) Inhalation every 12 hours  cholecalciferol 2000 Unit(s) Oral daily  docusate sodium 100 milliGRAM(s) Oral daily  enoxaparin Injectable 40 milliGRAM(s) SubCutaneous every 24 hours  furosemide   Injectable 20 milliGRAM(s) IV Push daily  insulin glargine Injectable (LANTUS) 7 Unit(s) SubCutaneous at bedtime  insulin lispro (HumaLOG) corrective regimen sliding scale   SubCutaneous three times a day before meals  insulin lispro (HumaLOG) corrective regimen sliding scale   SubCutaneous at bedtime  insulin lispro Injectable (HumaLOG) 4 Unit(s) SubCutaneous before breakfast  insulin lispro Injectable (HumaLOG) 2 Unit(s) SubCutaneous before lunch  insulin lispro Injectable (HumaLOG) 2 Unit(s) SubCutaneous with dinner  isosorbide   mononitrate ER Tablet (IMDUR) 30 milliGRAM(s) Oral daily  melatonin 1 milliGRAM(s) Oral at bedtime  montelukast 10 milliGRAM(s) Oral daily  multivitamin 1 Tablet(s) Oral daily  nystatin    Suspension 293593 Unit(s) Oral four times a day  pantoprazole    Tablet 40 milliGRAM(s) Oral before breakfast  polyethylene glycol 3350 17 Gram(s) Oral daily  predniSONE   Tablet 30 milliGRAM(s) Oral <User Schedule>  Roflumilast (Daliresp) 250 MICROGram(s) 250 MICROGram(s) Oral daily  senna 2 Tablet(s) Oral at bedtime  theophylline ER Capsule 400 milliGRAM(s) Oral daily    Allergies    No Known Allergies    Intolerances    Review of Systems:  Constitutional: No fever  Eyes: No blurry vision  Neuro: No tremors  HEENT: No pain  Cardiovascular: No chest pain, palpitations  Respiratory: SOB, wheezing.   GI: No nausea, vomiting, abdominal pain  : No dysuria    ALL OTHER SYSTEMS REVIEWED AND NEGATIVE    PHYSICAL EXAM:  VITALS: T(C): 36.6 (12-20-18 @ 04:27)  T(F): 97.8 (12-20-18 @ 04:27), Max: 97.9 (12-19-18 @ 16:39)  HR: 103 (12-20-18 @ 14:08) (85 - 104)  BP: 147/78 (12-20-18 @ 11:31) (109/72 - 147/78)  RR:  (18 - 20)  SpO2:  (99% - 100%)  Wt(kg): --  GENERAL: sitting in bed, bipap on, working on lap top.    EYES: No proptosis, no lid lag, anicterics  RESPIRATORY: ON bipap machine.   PSYCH: Alert and oriented x 3, normal affect, normal mood    POCT Blood Glucose.: 111 mg/dL (12-20-18 @ 12:43)  POCT Blood Glucose.: 114 mg/dL (12-20-18 @ 08:41)  POCT Blood Glucose.: 309 mg/dL (12-19-18 @ 22:07)  POCT Blood Glucose.: 193 mg/dL (12-19-18 @ 17:29)  POCT Blood Glucose.: 85 mg/dL (12-19-18 @ 12:51)  POCT Blood Glucose.: 75 mg/dL (12-19-18 @ 09:36)  POCT Blood Glucose.: 237 mg/dL (12-18-18 @ 22:11)  POCT Blood Glucose.: 153 mg/dL (12-18-18 @ 18:54)  POCT Blood Glucose.: 152 mg/dL (12-18-18 @ 17:24)  POCT Blood Glucose.: 127 mg/dL (12-18-18 @ 15:22)  POCT Blood Glucose.: 230 mg/dL (12-18-18 @ 09:45)  POCT Blood Glucose.: 340 mg/dL (12-18-18 @ 06:43)  POCT Blood Glucose.: 204 mg/dL (12-17-18 @ 23:28)  POCT Blood Glucose.: 98 mg/dL (12-17-18 @ 22:06)  POCT Blood Glucose.: 144 mg/dL (12-17-18 @ 17:33)  POCT Blood Glucose.: 49 mg/dL (12-17-18 @ 17:11)  POCT Blood Glucose.: 51 mg/dL (12-17-18 @ 17:10)      12-19    131<L>  |  89<L>  |  39<H>  ----------------------------<  87  3.8   |  34<H>  |  1.11    EGFR if : 63  EGFR if non : 54<L>    Ca    9.1      12-19          Thyroid Function Tests:      Hemoglobin A1C, Whole Blood: 7.2 % <H> [4.0 - 5.6] (11-30-18 @ 07:58)

## 2018-12-20 NOTE — PROGRESS NOTE ADULT - ASSESSMENT
58 F PMH COPD on home O2 3L, HTN, HLD, CAD s/p x6 stents, T2DM, pHTN, PVD, p/w progressive worsening dyspnea x 2 weeks now complicated by chronic progressive hypoxic respiratory failure.     1. Chronic progressive hypoxic respiratory failure  multifactorial in the setting of worsening COPD, CAD, chronic diastolic CHF, pulmonary HTN   pt. appears mildly fluid overloaded on exam with generalized edema however improving   continue lasix 20mg IVP daily   echo with normal LV function, mild diastolic dysfunction, inadequate tricuspid regurg   cv stable no chest pain, no evidence of acute ischemia/ACS   bipap PRN   recommend repeat CXR to eval for pleural effusions   continue dueonebs, inhaled steroids, singulair/theophylline/daliresp, pulm f/u     2. CAD s/p PCI  cv stable  continue asa, imdur    3. COPD   remains on bipap  continue Duoneb inhaled steroids, singulair/theophylline/daliresp, pulm f/u     4. hyponatremia/hyperkalemia  trend bmp  renal f/u     dvt ppx

## 2018-12-20 NOTE — PROGRESS NOTE ADULT - ASSESSMENT
ASSESSMENT:    ongoing dyspnea with minimal exertion with chronic hypoxic and hypercapnic respiratory failure due to very severe COPD with "exacerbation" - adequate oxygenation on a nasal canula in the setting of profound dyspnea suggests that alterations in the mechanics of breathing due to lung hyperinflation and obesity are playing a significant role in shortness of breath - there is evidence of CAD without pulmonary hypertension on the CT scan which is without pulmonary emboli or pneumonia - cardiac catheterization last year revealed patent stents - ECHO has a preserved LVEF, no significant valvular heart disease and no pulmonary hypertension - resolved AURORA and hyperkalemia - improving hyponatremia - resolved respiratory acidosis with BIPAP support - lower extremity weakness and pain 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 perhaps exacerbated by statin use    extremity swelling likely related to steroid induced fluid retention and nocturnal hypoxemia with worsening of elevated pulmonary artery pressures - no evidence of DVT on upper or lower extremity Duplex examination    HTN/HLD/DM    CAD s/p PCI    PAD    PLAN/RECOMMENDATIONS:    BIPAP 12/5 with 40% FiO2 for sleep - on and off during the day for increased work of breathing or recurrent respiratory acidosis - unable to tolerate using a nasal pillow interface with the hospital BIPAP machine  oxygen supplementation to keep saturation greater than 92% using a nasal canula when off BIPAP  repeat ABG  sputum culture - no growth - has completed a course of biaxin  home trilogy vent has been arranged with [facility]   albuterol/atrovent nebs q4h holding 2AM dose  pulmicort 0.5mg nebs q12h  singulair/theophylline/daliresp - check theophylline level    prednisone to 30mg daily - glucose control - nystatin - would continue to taper off by 10mg every 3 - 4 days given lack of improvement with systemic steroids and likely steroid related side effects  cardiology evaluation noted -  please confirm with them that adding azithromycin for its anti-inflammatory effects is not contraindicated by her history of CAD  cardiac meds: ASA/imdur/lasix - off crestor with possible myopathy - off norvasc which is perhaps exacerbating swelling - BP control  diurese as tolerated by renal function and hemodynamics - watch for worsening metabolic alkalosis with loop diuretic use which could lead to worsening hypercapnia  DVT prophylaxis - SQ lovenox  physical therapy as able  renal and endocrine evaluation noted  bowel regimen  TOMAS stockings  outpatient pulmonary rehabilitation  outpatient evaluation for lung transplantation    Will follow with you. Plan of care discussed with the patient at bedside and the medical team. Consult and progress notes have been sent to [facility] lung transplant team.    David Fair MD, [facility] - [phone]  Pulmonary Medicine

## 2018-12-20 NOTE — PROGRESS NOTE ADULT - PROBLEM SELECTOR PLAN 8
Echo with  mild diastolic dysfunction, no significant changes from prior study in 2014.  c/w IV Lasix 20mg IVP qd per Cardiology reccs

## 2018-12-20 NOTE — PROGRESS NOTE ADULT - SUBJECTIVE AND OBJECTIVE BOX
[facility] - [facility]     PROGRESS NOTE    CHIEF COMPLAINT: chronic hypoxic/hypercapnic respiratory failure; COPD exacerbation; emphysema; dyspnea    INTERVAL HISTORY: slept poorly last night with BIPAP - last ABG without acute respiratory acidosis or hypoxemia; barely able to get to the commode due to profound shortness of breath and marked leg weakness; no shortness of breath at rest on a nasal canula at rest; resolved AURORA; improved hyponatremia; no cough, sputum production, chest congestion or wheeze; no fevers, chills or sweats; no chest pain/pressure or palpitations; persistent extremity swelling upper > lower despite IV lasix; sputum cultures without growth x 2;     REVIEW OF SYSTEMS:  Constitutional: As per interval history  HEENT: Within normal limits  CV: As per interval history  Resp: As per interval history  GI: Within normal limits   : Within normal limits  Musculoskeletal: lower extremity weakness and pain  Skin: Within normal limits  Neurological: Within normal limits  Psychiatric: Within normal limits  Endocrine: Within normal limits  Hematologic/Lymphatic: Within normal limits  Allergic/Immunologic: Within normal limits    MEDICATIONS:     Pulmonary "  ALBUTerol/ipratropium for Nebulization 3 milliLiter(s) Nebulizer <User Schedule>  buDESOnide   0.5 milliGRAM(s) Respule 0.5 milliGRAM(s) Inhalation every 12 hours  montelukast 10 milliGRAM(s) Oral daily  theophylline ER Capsule 400 milliGRAM(s) Oral daily      Anti-microbials:  nystatin    Suspension [id number] Unit(s) Oral four times a day      Cardiovascular:  furosemide   Injectable 20 milliGRAM(s) IV Push daily  isosorbide   mononitrate ER Tablet (IMDUR) 30 milliGRAM(s) Oral daily      Other:  artificial tears (preservative free) Ophthalmic Solution 1 Drop(s) Both EYES three times a day  aspirin enteric coated 81 milliGRAM(s) Oral daily  BACItracin   Ointment 1 Application(s) Topical two times a day  Biotene Dry Mouth Oral Rinse 5 milliLiter(s) Swish and Spit two times a day  bisacodyl Suppository 10 milliGRAM(s) Rectal daily PRN  cholecalciferol 2000 Unit(s) Oral daily  dextrose 40% Gel 15 Gram(s) Oral once PRN  dextrose 5%. 1000 milliLiter(s) IV Continuous <Continuous>  dextrose 50% Injectable 12.5 Gram(s) IV Push once  dextrose 50% Injectable 25 Gram(s) IV Push once  dextrose 50% Injectable 25 Gram(s) IV Push once  docusate sodium 100 milliGRAM(s) Oral daily  enoxaparin Injectable 40 milliGRAM(s) SubCutaneous every 24 hours  glucagon  Injectable 1 milliGRAM(s) IntraMuscular once PRN  insulin glargine Injectable (LANTUS) 7 Unit(s) SubCutaneous at bedtime  insulin lispro (HumaLOG) corrective regimen sliding scale   SubCutaneous three times a day before meals  insulin lispro (HumaLOG) corrective regimen sliding scale   SubCutaneous at bedtime  insulin lispro Injectable (HumaLOG) 4 Unit(s) SubCutaneous before breakfast  insulin lispro Injectable (HumaLOG) 2 Unit(s) SubCutaneous before lunch  insulin lispro Injectable (HumaLOG) 2 Unit(s) SubCutaneous with dinner  melatonin 1 milliGRAM(s) Oral at bedtime  multivitamin 1 Tablet(s) Oral daily  ondansetron Injectable 4 milliGRAM(s) IV Push every 8 hours PRN  pantoprazole    Tablet 40 milliGRAM(s) Oral before breakfast  polyethylene glycol 3350 17 Gram(s) Oral daily  predniSONE   Tablet 30 milliGRAM(s) Oral <User Schedule>  Roflumilast (Daliresp) 250 MICROGram(s) 250 MICROGram(s) Oral daily  senna 2 Tablet(s) Oral at bedtime  sodium chloride 0.65% Nasal 1 Spray(s) Both Nostrils two times a day PRN        OBJECTIVE:    I&O's Detail    19 Dec 2018 07:01  -  20 Dec 2018 07:00  --------------------------------------------------------  IN:    Oral Fluid: 720 mL  Total IN: 720 mL    OUT:    Voided: 1300 mL  Total OUT: 1300 mL    Total NET: -580 mL    POCT Blood Glucose.: 114 mg/dL (20 Dec 2018 08:41)  POCT Blood Glucose.: 309 mg/dL (19 Dec 2018 22:07)  POCT Blood Glucose.: 193 mg/dL (19 Dec 2018 17:29)  POCT Blood Glucose.: 85 mg/dL (19 Dec 2018 12:51)      PHYSICAL EXAM:       ICU Vital Signs Last 24 Hrs  T(C): 36.6 (20 Dec 2018 04:27), Max: 36.6 (19 Dec 2018 16:39)  T(F): 97.8 (20 Dec 2018 04:27), Max: 97.9 (19 Dec 2018 16:39)  HR: 88 (20 Dec 2018 11:31) (85 - 104)  BP: 147/78 (20 Dec 2018 11:31) (109/72 - 147/78)  BP(mean): --  ABP: --  ABP(mean): --  RR: 20 (20 Dec 2018 11:31) (18 - 20)  SpO2: 100% (20 Dec 2018 11:31) (99% - 100%) on 5lpm nasal canula     General: Awake. Alert. Cooperative. No distress. Appears stated age. Obese.   HEENT:  Atraumatic. Normocephalic. Anicteric. Normal oral mucosa. PERRL. EOMI.   Neck: Supple. Trachea midline. Thyroid without enlargement/tenderness/nodules. No carotid bruit. No JVD.	  Cardiovascular: Regular rate and rhythm. Distant S1 S2. No murmurs, rubs or gallops.  Respiratory: Respirations unlabored. Markedly decreased breath sounds throughout. Prolonged expiratory phase of respiration. No wheeze. No curvature.  Abdomen: Soft. Non-tender. Non-distended. No organomegaly. No masses. Normal bowel sounds. Obese.  Extremities: Warm to touch. No clubbing or cyanosis. Mild bilateral lower extremity edema up to the thigh. Moderate bilateral upper extremity swelling.  Pulses: Decreased lower extremity peripheral pulses.  Skin: No rashes or lesions. No ecchymoses. No cyanosis. Warm to touch. Multiple upper extremity excoriations  Lymph Nodes: Cervical, supraclavicular and axillary nodes normal  Neurological: Motor and sensory examination equal and normal. A and O x 3  Psychiatry: Appropriate mood and affect.        LABS:                        12.7   15.1  )-----------( 145      ( 19 Dec 2018 09:23 )             36.4                         13.1   12.5  )-----------( 129      ( 18 Dec 2018 12:58 )             38.3     12-19    131<L>  |  89<L>  |  39<H>  ----------------------------<  87  3.8   |  34<H>  |  1.11    12-18    124<L>  |  87<L>  |  38<H>  ----------------------------<  166<H>  4.5   |  24  |  1.10    Ca      9.1      12-19    Ca      8.9      12-18    Phos    2.8     12-15      Mg       2.2     12-16    Mg       2.2     12-15    ABG - ( 18 Dec 2018 13:23 )  pH: 7.46  /  pCO2: 47    /  pO2: 88    / HCO3: 33    / Base Excess: 8.0   /  SaO2: 97        ABG - ( 16 Dec 2018 20:53 )  pH: 7.44  /  pCO2: 53    /  pO2: 173   / HCO3: 36    / Base Excess: 10.0  /  SaO2: 100       ABG - ( 13 Dec 2018 06:29 )  pH: 7.30  /  pCO2: 71    /  pO2: 182   / HCO3: 34    / Base Excess: 5.8   /  SaO2: 99        ABG - ( 13 Dec 2018 00:59 )  pH: 7.32  /  pCO2: 71    /  pO2: 75    / HCO3: 35    / Base Excess: 7.1   /  SaO2: 95        ABG - ( 11 Dec 2018 11:45 )  pH: 7.39  /  pCO2: 54    /  pO2: 98    / HCO3: 32    / Base Excess: 6.2   /  SaO2: 98        ABG - ( 09 Dec 2018 11:26 )  pH: 7.35  /  pCO2: 63    /  pO2: 145   / HCO3: 34    / Base Excess: 6.6   /  SaO2: 99      ABG - ( 09 Dec 2018 00:28 )  pH: 7.32  /  pCO2: 71    /  pO2: 124   / HCO3: 36    / Base Excess: 7.6   /  SaO2: 99        ABG - ( 08 Dec 2018 20:50 )  pH: 7.32  /  pCO2: 72    /  pO2: 80    / HCO3: 36    / Base Excess: 7.7   /  SaO2: 95        Serum Pro-Brain Natriuretic Peptide: 254 pg/mL (12-13 @ 14:00)    < from: Transthoracic Echocardiogram (12.07.18 @ 08:59) >    Patient name: GUY HARTMAN  YOB: 1960   Age: 58 (F)   MR#: [id number]  Study Date: 12/7/2018  [id number]  Study quality: Technically good  Referring Physician: Allen ingram MD  BloodPressure: 120/80 mmHg  Height: 163 cm  Weight: 88 kg  BSA: 1.9 m2  ------------------------------------------------------------------------  PROCEDURE: Transthoracic echocardiogram with 2-D, M-Mode  and complete spectral and color flow Doppler.  INDICATION: Other forms of dyspnea (R06.09)  ------------------------------------------------------------------------  Dimensions:    Normal Values:  LA:     3.6    2.0 - 4.0 cm  Ao:     2.9    2.0 - 3.8 cm  SEPTUM: 0.9    0.6 - 1.2 cm  PWT:    0.8    0.6- 1.1 cm  LVIDd:  4.9    3.0 - 5.6 cm  LVIDs:  2.9    1.8 - 4.0 cm  Derived variables:  LVMI: 73 g/m2  RWT: 0.32  Fractional short: 40 %  EF (Teicholtz): 71 %  Doppler Peak Velocity (m/sec): AoV=1.2  ------------------------------------------------------------------------  Observations:  Mitral Valve: Normal mitral valve.  Aortic Valve/Aorta: Normal trileaflet aortic valve. Peak  transaortic valve gradient equals 6 mm Hg, mean transaortic  valve gradient equals 3 mm Hg, aortic valve velocity time  integral equals 22 cm. Trace aortic regurgitation.  Aortic Root: 2.9 cm.  Left Atrium: Normal left atrium.  LA volume index = 18  cc/m2.  Left Ventricle: Normal left ventricular systolic function.  No segmental wall motion abnormalities. Normal left  ventricular internal dimensions and wall thicknesses. Mild  diastolic dysfunction (Stage I).  Right Heart: Normal right atrium. Normal right ventricular  size and function. Normal tricuspid valve. Minimal  tricuspid regurgitation. Normal pulmonic valve.  Pericardium/Pleura: Normal pericardium with no pericardial  effusion.  Hemodynamic: Estimated right atrial pressure is 8 mm Hg.  Inadequate tricuspid regurgitation Doppler envelope  precludes estimation of RVSP.  ------------------------------------------------------------------------  Conclusions:  1. Normal mitral valve.  2. Normal trileaflet aortic valve. Peak transaortic valve  gradient equals 6 mm Hg, mean transaortic valve gradient  equals 3 mm Hg, aortic valve velocity time integral equals  22 cm. Trace aortic regurgitation.  3. Aortic Root: 2.9 cm.  4. Normal left atrium.  LA volume index = 18 cc/m2.  5. Normal left ventricular internal dimensions and wall  thicknesses.  6. Normal left ventricular systolic function. No segmental  wall motion abnormalities.  7. Mild diastolic dysfunction (Stage I).  8. Normal right ventricular size and function.  9. Inadequate tricuspid regurgitation Doppler envelope  precludes estimation of RVSP.  10. Normal tricuspid valve. Minimal tricuspid  regurgitation.  *** Compared with echocardiogram report of 2/18/2014, no  significant changes noted.  ------------------------------------------------------------------------  Confirmed on  12/7/2018 - 13:49:43 by Shefali Gómez M.D.  ------------------------------------------------------------------------    < end of copied text >  ---------------------------------------------------------------------------------------------------------------    MICROBIOLOGY:     Culture - Sputum . (12.07.18 @ 08:34)    Gram Stain:   Rare polymorphonuclear leukocytes per low power field  Rare Squamous epithelial cells per low power field  Numerous Gram Positive Cocci in Pairs and Chains per oil power field    Specimen Source: .Sputum Sputum    Culture Results:   Normal Respiratory Samaria present    Culture - Sputum . (12.05.18 @ 22:50)    Gram Stain:   Moderate polymorphonuclear leukocytes per low power field  Few Squamous epithelial cells per low power field  Moderate Gram Positive Cocci in Pairs and Chains per oil power field    Specimen Source: .Sputum Sputum    Culture Results:   Normal Respiratory Samaria present    Rapid Respiratory Viral Panel (11.30.18 @ 01:30)    Rapid RVP Result: NotDete: The [employer] RVP Rapid uses polymerase chain reaction (PCR) and melt  curve analysis to screen for adenovirus; coronavirus HKU1, NL63, 229E,  OC43; human metapneumovirus (hMPV); human enterovirus/rhinovirus  (Entero/RV); influenza A; influenza A/H1;influenza A/H3; influenza  A/H1-2009; influenza B; parainfluenza viruses 1, 2, 3, 4; respiratory  syncytial virus; Bordetella pertussis; Mycoplasma pneumoniae; and  Chlamydophila pneumoniae.    RADIOLOGY:  [x] Chest radiographs reviewed and interpreted by me    < from: US Kidney and Bladder (12.17.18 @ 16:26) >    EXAM:  US KIDNEYS AND BLADDER                          PROCEDURE DATE:  12/17/2018      IMPRESSION:     Normal renal ultrasound.    MATT ZIMMER M.D., RADIOLOGY RESIDENT  This document has been electronically signed.  RAYMUNDO DUMONT M.D., ATTENDING RADIOLOGIST  This document has been electronically signed. Dec 17 2018  4:56PM    < end of copied text >  ---------------------------------------------------------------------------------------------------------------  < from: Xray Chest 1 View- PORTABLE-Routine (12.13.18 @ 13:10) >    EXAM:  XR CHEST PORTABLE ROUTINE 1V                          PROCEDURE DATE:  12/13/2018      INTERPRETATION:  HISTORY: Shortness of breath    TECHNIQUE: Portable frontal chest x-ray    COMPARISON: Chest x-ray from 11/29/2018    FINDINGS:    No focal consolidation.  There is no pneumothorax. There are no pleural effusions.   The cardiomediastinal silhouette cannot be adequately assessed on this   projection.    IMPRESSION:   Clear lungs.       JEANIE SPEARS M.D., RADIOLOGY RESIDENT  This document has been electronically signed.  JEYSON SANCHEZ M.D., ATTENDING RADIOLOGIST  This document has been electronically signed. Dec 13 2018  3:28PM      < end of copied text >  ---------------------------------------------------------------------------------------------------------------  < from: CT Angio Chest w/ IV Cont (12.04.18 @ 16:30) >    EXAM:  CT ANGIO CHEST (W)AW IC                          PROCEDURE DATE:  12/04/2018      INTERPRETATION:  CT CHEST WITH CONTRAST    INDICATION: Known COPD not responding to steroids and bronchodilators.   Progressively worsening dyspnea. Evaluate for pulmonary embolism.    TECHNIQUE: Enhanced helical images were obtained of the chest. Coronal   and sagittal images were reconstructed. Maximum intensity projection   images were generated. Images were obtained after the uneventful   administration of 60 cc nonionic intravenous Omnipaque 350.  40 cc of   Omnipaque 350 was discarded.    COMPARISON: CT chest 2/18/2014.    FINDINGS:     Lungs And Airways: Emphysematous changes of the lung.      The airways are unremarkable.      Pleura: No pneumothorax. No pleural effusion.    Mediastinum: There are no enlarged chest lymph nodes. The visualized   portion of the thyroid gland is unremarkable.       Heart and Vasculature: No pulmonary embolism.    The main pulmonary artery is normal in caliber. No thoracic aortic   aneurysm or dissection. Atheromatous disease of the aorta.    The heart is normal in size.  There is no pericardial effusion.   Coronary artery disease with calcified plaque involving the right and   left main coronary arteries and the left anterior descending artery.      Upper Abdomen: The upper abdomen is unremarkable.    Bones And Soft Tissues: Degenerative changes of the spine.  The soft   tissues are unremarkable.      IMPRESSION:   1.  No pulmonary embolism.  2. Emphysematous changes of the lung.    DIANA MEDINA M.D., RADIOLOGY RESIDENT  This document has been electronically signed.  JEYSON SANCHEZ M.D., ATTENDING RADIOLOGIST  This document has been electronically signed. Dec  4 2018  5:21PM      < end of copied text >  ---------------------------------------------------------------------------------------------------------------  < from: [facility] Duplex Lower Ext Vein Scan, [place] (12.06.18 @ 15:40) >    EXAM:  DUPLEX SCAN EXT VEINS LOWER BI                          PROCEDURE DATE:  12/06/2018      INTERPRETATION:  CLINICAL INFORMATION: Shortness of breath, leg pain and   swelling.    COMPARISON: Bilateral lower extremity venous duplex study dated 1/27/2009.    TECHNIQUE: Duplex sonography of the BILATERAL LOWER extremities with   color and spectral Doppler, with and without compression.      FINDINGS:    There is normal compressibility of the bilateral common femoral, femoral   and popliteal veins. No calf vein thrombosis is detected.    Doppler examination shows normal spontaneous and phasic flow.    IMPRESSION:     No evidence of bilateral lower extremity deep venous thrombosis.    JUSTIN ZAVALETA M.D., ATTENDING RADIOLOGIST  This document has been electronically signed. Dec  6 2018  4:03PM    < end of copied text >  ---------------------------------------------------------------------------------------------------------------    < from: [facility] Duplex Upper Ext Vein Scan, [place] (12.12.18 @ 17:36) >    EXAM:  DUPLEX SCAN EXT VEINS UPPER BI                          PROCEDURE DATE:  12/12/2018      IMPRESSION:     No evidence of BILATERAL upper extremity deep venous thrombosis.    SNEHA KELSEY M.D., RADIOLOGY RESIDENT  This document has been electronically signed.  RAYMUNDO DUMONT M.D., ATTENDING RADIOLOGIST  This document has been electronically signed. Dec 13 2018  9:22AM      < end of copied text >  ---------------------------------------------------------------------------------------------------------------  SPIROMETRY:     FEV1 0.56 liters - 22% predicted  FVC 1.73 liters - 52% predicted  FEV1% 32    c/w very severe obstructive lung disease

## 2018-12-20 NOTE — PROGRESS NOTE ADULT - SUBJECTIVE AND OBJECTIVE BOX
Patient is a 58y old  Female who presents with a chief complaint of dyspnea (19 Dec 2018 11:45)      SUBJECTIVE / OVERNIGHT EVENTS:    MEDICATIONS  (STANDING):  ALBUTerol/ipratropium for Nebulization 3 milliLiter(s) Nebulizer <User Schedule>  artificial tears (preservative free) Ophthalmic Solution 1 Drop(s) Both EYES three times a day  aspirin enteric coated 81 milliGRAM(s) Oral daily  BACItracin   Ointment 1 Application(s) Topical two times a day  Biotene Dry Mouth Oral Rinse 5 milliLiter(s) Swish and Spit two times a day  buDESOnide   0.5 milliGRAM(s) Respule 0.5 milliGRAM(s) Inhalation every 12 hours  cholecalciferol 2000 Unit(s) Oral daily  dextrose 5%. 1000 milliLiter(s) (50 mL/Hr) IV Continuous <Continuous>  dextrose 50% Injectable 12.5 Gram(s) IV Push once  dextrose 50% Injectable 25 Gram(s) IV Push once  dextrose 50% Injectable 25 Gram(s) IV Push once  docusate sodium 100 milliGRAM(s) Oral daily  enoxaparin Injectable 40 milliGRAM(s) SubCutaneous every 24 hours  furosemide   Injectable 20 milliGRAM(s) IV Push daily  insulin glargine Injectable (LANTUS) 7 Unit(s) SubCutaneous at bedtime  insulin lispro (HumaLOG) corrective regimen sliding scale   SubCutaneous three times a day before meals  insulin lispro (HumaLOG) corrective regimen sliding scale   SubCutaneous at bedtime  insulin lispro Injectable (HumaLOG) 4 Unit(s) SubCutaneous before breakfast  insulin lispro Injectable (HumaLOG) 2 Unit(s) SubCutaneous before lunch  insulin lispro Injectable (HumaLOG) 2 Unit(s) SubCutaneous with dinner  isosorbide   mononitrate ER Tablet (IMDUR) 30 milliGRAM(s) Oral daily  melatonin 1 milliGRAM(s) Oral at bedtime  montelukast 10 milliGRAM(s) Oral daily  multivitamin 1 Tablet(s) Oral daily  nystatin    Suspension 207977 Unit(s) Oral four times a day  pantoprazole    Tablet 40 milliGRAM(s) Oral before breakfast  polyethylene glycol 3350 17 Gram(s) Oral daily  predniSONE   Tablet 30 milliGRAM(s) Oral <User Schedule>  Roflumilast (Daliresp) 250 MICROGram(s) 250 MICROGram(s) Oral daily  senna 2 Tablet(s) Oral at bedtime  theophylline ER Capsule 400 milliGRAM(s) Oral daily    MEDICATIONS  (PRN):  bisacodyl Suppository 10 milliGRAM(s) Rectal daily PRN Constipation  dextrose 40% Gel 15 Gram(s) Oral once PRN Blood Glucose LESS THAN 70 milliGRAM(s)/deciliter  glucagon  Injectable 1 milliGRAM(s) IntraMuscular once PRN Glucose LESS THAN 70 milligrams/deciliter  ondansetron Injectable 4 milliGRAM(s) IV Push every 8 hours PRN Nausea and/or Vomiting  sodium chloride 0.65% Nasal 1 Spray(s) Both Nostrils two times a day PRN Nasal Congestion      Vital Signs Last 24 Hrs  T(C): 36.6 (20 Dec 2018 04:27), Max: 36.6 (19 Dec 2018 09:59)  T(F): 97.8 (20 Dec 2018 04:27), Max: 97.9 (19 Dec 2018 16:39)  HR: 88 (20 Dec 2018 09:08) (85 - 104)  BP: 109/72 (20 Dec 2018 06:59) (109/72 - 135/80)  BP(mean): --  RR: 18 (20 Dec 2018 04:27) (18 - 20)  SpO2: 100% (20 Dec 2018 09:08) (99% - 100%)  CAPILLARY BLOOD GLUCOSE      POCT Blood Glucose.: 114 mg/dL (20 Dec 2018 08:41)  POCT Blood Glucose.: 309 mg/dL (19 Dec 2018 22:07)  POCT Blood Glucose.: 193 mg/dL (19 Dec 2018 17:29)  POCT Blood Glucose.: 85 mg/dL (19 Dec 2018 12:51)    I&O's Summary    19 Dec 2018 07:01  -  20 Dec 2018 07:00  --------------------------------------------------------  IN: 720 mL / OUT: 1300 mL / NET: -580 mL        PHYSICAL EXAM:  GENERAL: NAD, well-developed  HEAD:  Atraumatic, Normocephalic  EYES: EOMI, PERRLA, conjunctiva and sclera clear  NECK: Supple, No JVD  CHEST/LUNG: Clear to auscultation bilaterally; No wheeze  HEART: Regular rate and rhythm; No murmurs, rubs, or gallops  ABDOMEN: Soft, Nontender, Nondistended; Bowel sounds present  EXTREMITIES:  2+ Peripheral Pulses, No clubbing, cyanosis, or edema  PSYCH: AAOx3  NEUROLOGY: non-focal  SKIN: No rashes or lesions    LABS:                        12.7   15.1  )-----------( 145      ( 19 Dec 2018 09:23 )             36.4     12-19    131<L>  |  89<L>  |  39<H>  ----------------------------<  87  3.8   |  34<H>  |  1.11    Ca    9.1      19 Dec 2018 09:02                RADIOLOGY & ADDITIONAL TESTS:    Imaging Personally Reviewed:    Consultant(s) Notes Reviewed:      Care Discussed with Consultants/Other Providers: Patient is a 58y old  Female who presents with a chief complaint of dyspnea (19 Dec 2018 11:45)      SUBJECTIVE / OVERNIGHT EVENTS:  Pt seen and examined. No acute events overnight. She reports no improvement in dyspnea. Initially did well on 5L NC this am per RT but now pt reports feeling sob. She denies CP, palpitations. Notes mild improvement in UE edema.    MEDICATIONS  (STANDING):  ALBUTerol/ipratropium for Nebulization 3 milliLiter(s) Nebulizer <User Schedule>  artificial tears (preservative free) Ophthalmic Solution 1 Drop(s) Both EYES three times a day  aspirin enteric coated 81 milliGRAM(s) Oral daily  BACItracin   Ointment 1 Application(s) Topical two times a day  Biotene Dry Mouth Oral Rinse 5 milliLiter(s) Swish and Spit two times a day  buDESOnide   0.5 milliGRAM(s) Respule 0.5 milliGRAM(s) Inhalation every 12 hours  cholecalciferol 2000 Unit(s) Oral daily  dextrose 5%. 1000 milliLiter(s) (50 mL/Hr) IV Continuous <Continuous>  dextrose 50% Injectable 12.5 Gram(s) IV Push once  dextrose 50% Injectable 25 Gram(s) IV Push once  dextrose 50% Injectable 25 Gram(s) IV Push once  docusate sodium 100 milliGRAM(s) Oral daily  enoxaparin Injectable 40 milliGRAM(s) SubCutaneous every 24 hours  furosemide   Injectable 20 milliGRAM(s) IV Push daily  insulin glargine Injectable (LANTUS) 7 Unit(s) SubCutaneous at bedtime  insulin lispro (HumaLOG) corrective regimen sliding scale   SubCutaneous three times a day before meals  insulin lispro (HumaLOG) corrective regimen sliding scale   SubCutaneous at bedtime  insulin lispro Injectable (HumaLOG) 4 Unit(s) SubCutaneous before breakfast  insulin lispro Injectable (HumaLOG) 2 Unit(s) SubCutaneous before lunch  insulin lispro Injectable (HumaLOG) 2 Unit(s) SubCutaneous with dinner  isosorbide   mononitrate ER Tablet (IMDUR) 30 milliGRAM(s) Oral daily  melatonin 1 milliGRAM(s) Oral at bedtime  montelukast 10 milliGRAM(s) Oral daily  multivitamin 1 Tablet(s) Oral daily  nystatin    Suspension 043551 Unit(s) Oral four times a day  pantoprazole    Tablet 40 milliGRAM(s) Oral before breakfast  polyethylene glycol 3350 17 Gram(s) Oral daily  predniSONE   Tablet 30 milliGRAM(s) Oral <User Schedule>  Roflumilast (Daliresp) 250 MICROGram(s) 250 MICROGram(s) Oral daily  senna 2 Tablet(s) Oral at bedtime  theophylline ER Capsule 400 milliGRAM(s) Oral daily    MEDICATIONS  (PRN):  bisacodyl Suppository 10 milliGRAM(s) Rectal daily PRN Constipation  dextrose 40% Gel 15 Gram(s) Oral once PRN Blood Glucose LESS THAN 70 milliGRAM(s)/deciliter  glucagon  Injectable 1 milliGRAM(s) IntraMuscular once PRN Glucose LESS THAN 70 milligrams/deciliter  ondansetron Injectable 4 milliGRAM(s) IV Push every 8 hours PRN Nausea and/or Vomiting  sodium chloride 0.65% Nasal 1 Spray(s) Both Nostrils two times a day PRN Nasal Congestion      Vital Signs Last 24 Hrs  T(C): 36.6 (20 Dec 2018 04:27), Max: 36.6 (19 Dec 2018 09:59)  T(F): 97.8 (20 Dec 2018 04:27), Max: 97.9 (19 Dec 2018 16:39)  HR: 88 (20 Dec 2018 09:08) (85 - 104)  BP: 109/72 (20 Dec 2018 06:59) (109/72 - 135/80)  BP(mean): --  RR: 18 (20 Dec 2018 04:27) (18 - 20)  SpO2: 100% (20 Dec 2018 09:08) (99% - 100%)  CAPILLARY BLOOD GLUCOSE      POCT Blood Glucose.: 114 mg/dL (20 Dec 2018 08:41)  POCT Blood Glucose.: 309 mg/dL (19 Dec 2018 22:07)  POCT Blood Glucose.: 193 mg/dL (19 Dec 2018 17:29)  POCT Blood Glucose.: 85 mg/dL (19 Dec 2018 12:51)    I&O's Summary    19 Dec 2018 07:01  -  20 Dec 2018 07:00  --------------------------------------------------------  IN: 720 mL / OUT: 1300 mL / NET: -580 mL        PHYSICAL EXAM:  GENERAL: NAD, anicteric, afebrile  HEAD:  Atraumatic, Normocephalic  EYES: EOMI, PERRLA, conjunctiva and sclera clear  NECK: Supple, No JVD  CHEST/LUNG: Clear to auscultation bilaterally; No wheeze  HEART: Regular rate and rhythm; No murmurs, rubs, or gallops  ABDOMEN: Soft, Nontender, Nondistended; Bowel sounds present  EXTREMITIES:  2+ Peripheral Pulses, b/l UE (improving) , b/l /LE edema  PSYCH: AAOx3  NEUROLOGY: non-focal  SKIN: No rashes or lesions    LABS:                        12.7   15.1  )-----------( 145      ( 19 Dec 2018 09:23 )             36.4     12-19    131<L>  |  89<L>  |  39<H>  ----------------------------<  87  3.8   |  34<H>  |  1.11    Ca    9.1      19 Dec 2018 09:02

## 2018-12-20 NOTE — PROGRESS NOTE ADULT - PROBLEM SELECTOR PLAN 3
A1C 7.2, but with recent hypoglycemic episodes ; Lantus decreased to 6U qhs 2 days ago ; AM FS now uptrending  -c/w Lantus to 7 units QHS  -c/w Humalog 8 units TID w/meals (hold if not eating)  - Humalog moderate correction scale AC and Mod HS scale  - Continue to monitor blood sugars.  - Endocrine following

## 2018-12-21 LAB
GLUCOSE BLDC GLUCOMTR-MCNC: 214 MG/DL — HIGH (ref 70–99)
GLUCOSE BLDC GLUCOMTR-MCNC: 260 MG/DL — HIGH (ref 70–99)
GLUCOSE BLDC GLUCOMTR-MCNC: 72 MG/DL — SIGNIFICANT CHANGE UP (ref 70–99)
GLUCOSE BLDC GLUCOMTR-MCNC: 98 MG/DL — SIGNIFICANT CHANGE UP (ref 70–99)

## 2018-12-21 PROCEDURE — 99233 SBSQ HOSP IP/OBS HIGH 50: CPT

## 2018-12-21 PROCEDURE — 99232 SBSQ HOSP IP/OBS MODERATE 35: CPT

## 2018-12-21 PROCEDURE — 93010 ELECTROCARDIOGRAM REPORT: CPT

## 2018-12-21 RX ORDER — INSULIN LISPRO 100/ML
3 VIAL (ML) SUBCUTANEOUS
Qty: 0 | Refills: 0 | Status: DISCONTINUED | OUTPATIENT
Start: 2018-12-22 | End: 2018-12-26

## 2018-12-21 RX ORDER — INSULIN LISPRO 100/ML
3 VIAL (ML) SUBCUTANEOUS
Qty: 0 | Refills: 0 | Status: DISCONTINUED | OUTPATIENT
Start: 2018-12-22 | End: 2018-12-27

## 2018-12-21 RX ORDER — AZITHROMYCIN 500 MG/1
250 TABLET, FILM COATED ORAL
Qty: 0 | Refills: 0 | Status: DISCONTINUED | OUTPATIENT
Start: 2018-12-21 | End: 2019-01-15

## 2018-12-21 RX ORDER — INSULIN GLARGINE 100 [IU]/ML
4 INJECTION, SOLUTION SUBCUTANEOUS AT BEDTIME
Qty: 0 | Refills: 0 | Status: DISCONTINUED | OUTPATIENT
Start: 2018-12-21 | End: 2018-12-26

## 2018-12-21 RX ADMIN — Medication 1 APPLICATION(S): at 06:32

## 2018-12-21 RX ADMIN — Medication 1 APPLICATION(S): at 17:27

## 2018-12-21 RX ADMIN — Medication 500000 UNIT(S): at 06:34

## 2018-12-21 RX ADMIN — Medication 1 DROP(S): at 22:12

## 2018-12-21 RX ADMIN — Medication 3 MILLILITER(S): at 22:12

## 2018-12-21 RX ADMIN — ENOXAPARIN SODIUM 40 MILLIGRAM(S): 100 INJECTION SUBCUTANEOUS at 22:12

## 2018-12-21 RX ADMIN — SENNA PLUS 2 TABLET(S): 8.6 TABLET ORAL at 22:10

## 2018-12-21 RX ADMIN — Medication 1 DROP(S): at 12:49

## 2018-12-21 RX ADMIN — PANTOPRAZOLE SODIUM 40 MILLIGRAM(S): 20 TABLET, DELAYED RELEASE ORAL at 06:34

## 2018-12-21 RX ADMIN — ONDANSETRON 4 MILLIGRAM(S): 8 TABLET, FILM COATED ORAL at 16:03

## 2018-12-21 RX ADMIN — Medication 1 TABLET(S): at 11:49

## 2018-12-21 RX ADMIN — Medication 400 MILLIGRAM(S): at 08:55

## 2018-12-21 RX ADMIN — Medication 0.5 MILLIGRAM(S): at 06:33

## 2018-12-21 RX ADMIN — Medication 3 MILLILITER(S): at 06:32

## 2018-12-21 RX ADMIN — INSULIN GLARGINE 4 UNIT(S): 100 INJECTION, SOLUTION SUBCUTANEOUS at 22:20

## 2018-12-21 RX ADMIN — Medication 1 DROP(S): at 06:34

## 2018-12-21 RX ADMIN — Medication 2 UNIT(S): at 17:45

## 2018-12-21 RX ADMIN — MONTELUKAST 10 MILLIGRAM(S): 4 TABLET, CHEWABLE ORAL at 11:49

## 2018-12-21 RX ADMIN — Medication 30 MILLIGRAM(S): at 08:55

## 2018-12-21 RX ADMIN — Medication 3 MILLILITER(S): at 09:11

## 2018-12-21 RX ADMIN — Medication 3 MILLILITER(S): at 17:26

## 2018-12-21 RX ADMIN — Medication 20 MILLIGRAM(S): at 06:34

## 2018-12-21 RX ADMIN — POLYETHYLENE GLYCOL 3350 17 GRAM(S): 17 POWDER, FOR SOLUTION ORAL at 11:49

## 2018-12-21 RX ADMIN — Medication 100 MILLIGRAM(S): at 11:49

## 2018-12-21 RX ADMIN — Medication 2000 UNIT(S): at 11:49

## 2018-12-21 RX ADMIN — ISOSORBIDE MONONITRATE 30 MILLIGRAM(S): 60 TABLET, EXTENDED RELEASE ORAL at 15:11

## 2018-12-21 RX ADMIN — Medication 0.5 MILLIGRAM(S): at 17:27

## 2018-12-21 RX ADMIN — Medication 3: at 17:45

## 2018-12-21 RX ADMIN — Medication 1 MILLIGRAM(S): at 22:10

## 2018-12-21 RX ADMIN — Medication 500000 UNIT(S): at 17:27

## 2018-12-21 RX ADMIN — Medication 81 MILLIGRAM(S): at 11:49

## 2018-12-21 RX ADMIN — Medication 2 UNIT(S): at 12:49

## 2018-12-21 RX ADMIN — Medication 3 MILLILITER(S): at 12:53

## 2018-12-21 RX ADMIN — Medication 500000 UNIT(S): at 11:49

## 2018-12-21 RX ADMIN — Medication 4 UNIT(S): at 09:10

## 2018-12-21 NOTE — PROGRESS NOTE ADULT - ASSESSMENT
58-year-old woman with PMH of COPD on home O2 3L, HTN, HLD, CAD s/p 6 stents, Type 2 DM, PVD, p/w progressive worsening dyspnea x 2 weeks c/w COPD exacerbation.

## 2018-12-21 NOTE — CHART NOTE - NSCHARTNOTEFT_GEN_A_CORE
58yoF w/ advanced COPD on home O2 3L (being evaluated at [place] for lung transplant), HTN, HLD, CAD s/p 6 stents, DMII on metformin, PVD, who p/w progressive worsening dyspnea x 3 weeks c/w COPD exacerbation. Renal called for persistent hyperkalemia.    Pts hyperK multifactorial in setting of ARB use, hyperglycemia, low elida/renin state, AURORA and acidosis.   HyperK now resolved.  - Keep off Benicar on discharge.  - C/w Bipap to help improving CO2 retention resulting in respiratory acidosis.  - C/w blood glucose control (improved now that steroids decreased).   - C/w diuretic (lasix).    AURORA thought possibly from volume overload. Baseline sCr 1-1.3. UA bland.   AURORA now resolved w/ diuresis. sCr now in 0.8 range.  - Would c/w lasix on discharge- switch to PO (40mg PO daily- may increase to BID if edema or weights uptrend).  - Dose meds renally.     HypoNa in setting of volume overload and possible retention.   - Improves w/ diuresis. C/w diuretics  - C/w fluid restriction.  - Pt also hold her urine (does not want to go to the bathroom frequently), which is likely contributing to the fluctuations in her Na.     Will sign off at this time.  Please reconsult as needed. 58yoF w/ advanced COPD on home O2 3L (being evaluated at [place] for lung transplant), HTN, HLD, CAD s/p 6 stents, DMII on metformin, PVD, who p/w progressive worsening dyspnea x 3 weeks c/w COPD exacerbation. Renal called for persistent hyperkalemia.    Pts hyperK multifactorial in setting of ARB use, hyperglycemia, low elida/renin state, AURORA and acidosis.   HyperK now resolved.  - Keep off Benicar on discharge.  - C/w Bipap to help improving CO2 retention resulting in respiratory acidosis.  - C/w blood glucose control (improved now that steroids decreased).   - C/w diuretic (lasix).    AURORA thought possibly from volume overload. Baseline sCr 1-1.3. UA bland.   AURORA now resolved w/ diuresis. sCr now in 0.8 range.  - Would c/w lasix on discharge- switch to PO (40mg PO daily- may increase to BID if edema or weights uptrend).  - Dose meds renally.     HypoNa in setting of volume overload and possible retention.   - Improves w/ diuresis. C/w diuretics  - C/w fluid restriction.  - Pt also hold her urine (does not want to go to the bathroom frequently), which is likely contributing to the fluctuations in her Na.     Will sign off at this time.  Please reconsult as needed

## 2018-12-21 NOTE — PROGRESS NOTE ADULT - PROBLEM SELECTOR PLAN 1
-test BG AC/HS  -Decrease Lantus 4 units QHS  -Adjust Humalog 3-3-2 w/meals (hold if not eating)  -c/w Humalog low correction scale AC and Low HS scale  -Please notify endocrine when steroids further tapered so we can adjust the does of insulin.   discharge plan: restart home Metformin  pager: [phone]

## 2018-12-21 NOTE — PROGRESS NOTE ADULT - SUBJECTIVE AND OBJECTIVE BOX
[facility] - [facility]     PROGRESS NOTE    CHIEF COMPLAINT: chronic hypoxic/hypercapnic respiratory failure; COPD exacerbation; emphysema; dyspnea    INTERVAL HISTORY:  slept poorly last night with BIPAP - last ABG without acute respiratory acidosis or hypoxemia; legs seem to be getting stronger - now able to stand from a seated position on the commode and lift her legs at the hip sitting in a chair; severe dyspnea with minimal exertion; on and off BIPAP during the day for shortness of breath at rest despite an adequate oxygenation on a nasal canula; resolved AURORA; hyponatremia; no cough, sputum production, chest congestion or wheeze; no fevers, chills or sweats; no chest pain/pressure or palpitations; persistent extremity swelling upper > lower despite IV lasix; sputum cultures without growth x 2;     REVIEW OF SYSTEMS:  Constitutional: As per interval history  HEENT: Within normal limits  CV: As per interval history  Resp: As per interval history  GI: Within normal limits   : Within normal limits  Musculoskeletal: lower extremity weakness and pain  Skin: Within normal limits  Neurological: Within normal limits  Psychiatric: Within normal limits  Endocrine: Within normal limits  Hematologic/Lymphatic: Within normal limits  Allergic/Immunologic: Within normal limits      MEDICATIONS:     Pulmonary "  ALBUTerol/ipratropium for Nebulization 3 milliLiter(s) Nebulizer <User Schedule>  buDESOnide   0.5 milliGRAM(s) Respule 0.5 milliGRAM(s) Inhalation every 12 hours  montelukast 10 milliGRAM(s) Oral daily  theophylline ER Capsule 400 milliGRAM(s) Oral daily      Anti-microbials:  nystatin    Suspension [id number] Unit(s) Oral four times a day      Cardiovascular:  furosemide   Injectable 20 milliGRAM(s) IV Push daily  isosorbide   mononitrate ER Tablet (IMDUR) 30 milliGRAM(s) Oral daily      Other:  artificial tears (preservative free) Ophthalmic Solution 1 Drop(s) Both EYES three times a day  aspirin enteric coated 81 milliGRAM(s) Oral daily  BACItracin   Ointment 1 Application(s) Topical two times a day  Biotene Dry Mouth Oral Rinse 5 milliLiter(s) Swish and Spit two times a day  bisacodyl Suppository 10 milliGRAM(s) Rectal daily PRN  cholecalciferol 2000 Unit(s) Oral daily  dextrose 40% Gel 15 Gram(s) Oral once PRN  dextrose 5%. 1000 milliLiter(s) IV Continuous <Continuous>  dextrose 50% Injectable 12.5 Gram(s) IV Push once  dextrose 50% Injectable 25 Gram(s) IV Push once  dextrose 50% Injectable 25 Gram(s) IV Push once  docusate sodium 100 milliGRAM(s) Oral daily  enoxaparin Injectable 40 milliGRAM(s) SubCutaneous every 24 hours  glucagon  Injectable 1 milliGRAM(s) IntraMuscular once PRN  insulin glargine Injectable (LANTUS) 7 Unit(s) SubCutaneous at bedtime  insulin lispro (HumaLOG) corrective regimen sliding scale   SubCutaneous three times a day before meals  insulin lispro (HumaLOG) corrective regimen sliding scale   SubCutaneous at bedtime  insulin lispro Injectable (HumaLOG) 4 Unit(s) SubCutaneous before breakfast  insulin lispro Injectable (HumaLOG) 2 Unit(s) SubCutaneous before lunch  insulin lispro Injectable (HumaLOG) 2 Unit(s) SubCutaneous with dinner  melatonin 1 milliGRAM(s) Oral at bedtime  multivitamin 1 Tablet(s) Oral daily  ondansetron Injectable 4 milliGRAM(s) IV Push every 8 hours PRN  pantoprazole    Tablet 40 milliGRAM(s) Oral before breakfast  polyethylene glycol 3350 17 Gram(s) Oral daily  predniSONE   Tablet 30 milliGRAM(s) Oral <User Schedule>  Roflumilast (Daliresp) 250 MICROGram(s) 250 MICROGram(s) Oral daily  senna 2 Tablet(s) Oral at bedtime  sodium chloride 0.65% Nasal 1 Spray(s) Both Nostrils two times a day PRN        OBJECTIVE:        I&O's Detail    20 Dec 2018 07:01  -  21 Dec 2018 07:00  --------------------------------------------------------  IN:    Oral Fluid: 880 mL  Total IN: 880 mL    OUT:  Total OUT: 0 mL    Total NET: 880 mL    POCT Blood Glucose.: 98 mg/dL (21 Dec 2018 12:09)  POCT Blood Glucose.: 72 mg/dL (21 Dec 2018 08:35)  POCT Blood Glucose.: 306 mg/dL (20 Dec 2018 22:19)  POCT Blood Glucose.: 319 mg/dL (20 Dec 2018 18:56)  POCT Blood Glucose.: 262 mg/dL (20 Dec 2018 17:24)      PHYSICAL EXAM:       ICU Vital Signs Last 24 Hrs  T(C): 36.3 (21 Dec 2018 06:31), Max: 36.8 (20 Dec 2018 20:36)  T(F): 97.3 (21 Dec 2018 06:31), Max: 98.3 (20 Dec 2018 20:36)  HR: 100 (21 Dec 2018 11:51) (89 - 110)  BP: 96/66 (21 Dec 2018 11:51) (96/66 - 124/65)  BP(mean): --  ABP: --  ABP(mean): --  RR: 18 (21 Dec 2018 06:31) (18 - 20)  SpO2: 98% (21 Dec 2018 09:00) (96% - 100%)     General: Awake. Alert. Cooperative. No distress. Appears stated age. Obese.   HEENT:  Atraumatic. Normocephalic. Anicteric. Normal oral mucosa. PERRL. EOMI.   Neck: Supple. Trachea midline. Thyroid without enlargement/tenderness/nodules. No carotid bruit. No JVD.	  Cardiovascular: Regular rate and rhythm. Distant S1 S2. No murmurs, rubs or gallops.  Respiratory: Respirations unlabored. Markedly decreased breath sounds throughout. Prolonged expiratory phase of respiration. No wheeze. No curvature.  Abdomen: Soft. Non-tender. Non-distended. No organomegaly. No masses. Normal bowel sounds. Obese.  Extremities: Warm to touch. No clubbing or cyanosis. Mild bilateral lower extremity edema up to the thigh. Moderate bilateral upper extremity swelling.  Pulses: Decreased lower extremity peripheral pulses.  Skin: No rashes or lesions. No ecchymoses. No cyanosis. Warm to touch. Multiple upper extremity excoriations  Lymph Nodes: Cervical, supraclavicular and axillary nodes normal  Neurological: Motor and sensory examination equal and normal. A and O x 3  Psychiatry: Appropriate mood and affect.     LABS:                        12.3   13.1  )-----------( 127      ( 20 Dec 2018 14:35 )             35.0     12-20    127<L>  |  87<L>  |  36<H>  ----------------------------<  111<H>  4.8   |  29  |  0.84    12-19    131<L>  |  89<L>  |  39<H>  ----------------------------<  87  3.8   |  34<H>  |  1.11    Ca      8.9      12-20    Ca      9.1      12-19      ABG - ( 18 Dec 2018 13:23 )  pH: 7.46  /  pCO2: 47    /  pO2: 88    / HCO3: 33    / Base Excess: 8.0   /  SaO2: 97        ABG - ( 16 Dec 2018 20:53 )  pH: 7.44  /  pCO2: 53    /  pO2: 173   / HCO3: 36    / Base Excess: 10.0  /  SaO2: 100       ABG - ( 13 Dec 2018 06:29 )  pH: 7.30  /  pCO2: 71    /  pO2: 182   / HCO3: 34    / Base Excess: 5.8   /  SaO2: 99        ABG - ( 13 Dec 2018 00:59 )  pH: 7.32  /  pCO2: 71    /  pO2: 75    / HCO3: 35    / Base Excess: 7.1   /  SaO2: 95        ABG - ( 11 Dec 2018 11:45 )  pH: 7.39  /  pCO2: 54    /  pO2: 98    / HCO3: 32    / Base Excess: 6.2   /  SaO2: 98        ABG - ( 09 Dec 2018 11:26 )  pH: 7.35  /  pCO2: 63    /  pO2: 145   / HCO3: 34    / Base Excess: 6.6   /  SaO2: 99      ABG - ( 09 Dec 2018 00:28 )  pH: 7.32  /  pCO2: 71    /  pO2: 124   / HCO3: 36    / Base Excess: 7.6   /  SaO2: 99        ABG - ( 08 Dec 2018 20:50 )  pH: 7.32  /  pCO2: 72    /  pO2: 80    / HCO3: 36    / Base Excess: 7.7   /  SaO2: 95        Serum Pro-Brain Natriuretic Peptide: 254 pg/mL (12-13 @ 14:00)    < from: Transthoracic Echocardiogram (12.07.18 @ 08:59) >    Patient name: GUY HARTMAN  YOB: 1960   Age: 58 (F)   MR#: [id number]  Study Date: 12/7/2018  Location: [address] [place], [place] [id number]: [facility]  Study quality: Technically good  Referring Physician: Allen ingram MD  BloodPressure: 120/80 mmHg  Height: 163 cm  Weight: 88 kg  BSA: 1.9 m2  ------------------------------------------------------------------------  PROCEDURE: Transthoracic echocardiogram with 2-D, M-Mode  and complete spectral and color flow Doppler.  INDICATION: Other forms of dyspnea (R06.09)  ------------------------------------------------------------------------  Dimensions:    Normal Values:  LA:     3.6    2.0 - 4.0 cm  Ao:     2.9    2.0 - 3.8 cm  SEPTUM: 0.9    0.6 - 1.2 cm  PWT:    0.8    0.6- 1.1 cm  LVIDd:  4.9    3.0 - 5.6 cm  LVIDs:  2.9    1.8 - 4.0 cm  Derived variables:  LVMI: 73 g/m2  RWT: 0.32  Fractional short: 40 %  EF (Teicholtz): 71 %  Doppler Peak Velocity (m/sec): AoV=1.2  ------------------------------------------------------------------------  Observations:  Mitral Valve: Normal mitral valve.  Aortic Valve/Aorta: Normal trileaflet aortic valve. Peak  transaortic valve gradient equals 6 mm Hg, mean transaortic  valve gradient equals 3 mm Hg, aortic valve velocity time  integral equals 22 cm. Trace aortic regurgitation.  Aortic Root: 2.9 cm.  Left Atrium: Normal left atrium.  LA volume index = 18  cc/m2.  Left Ventricle: Normal left ventricular systolic function.  No segmental wall motion abnormalities. Normal left  ventricular internal dimensions and wall thicknesses. Mild  diastolic dysfunction (Stage I).  Right Heart: Normal right atrium. Normal right ventricular  size and function. Normal tricuspid valve. Minimal  tricuspid regurgitation. Normal pulmonic valve.  Pericardium/Pleura: Normal pericardium with no pericardial  effusion.  Hemodynamic: Estimated right atrial pressure is 8 mm Hg.  Inadequate tricuspid regurgitation Doppler envelope  precludes estimation of RVSP.  ------------------------------------------------------------------------  Conclusions:  1. Normal mitral valve.  2. Normal trileaflet aortic valve. Peak transaortic valve  gradient equals 6 mm Hg, mean transaortic valve gradient  equals 3 mm Hg, aortic valve velocity time integral equals  22 cm. Trace aortic regurgitation.  3. Aortic Root: 2.9 cm.  4. Normal left atrium.  LA volume index = 18 cc/m2.  5. Normal left ventricular internal dimensions and wall  thicknesses.  6. Normal left ventricular systolic function. No segmental  wall motion abnormalities.  7. Mild diastolic dysfunction (Stage I).  8. Normal right ventricular size and function.  9. Inadequate tricuspid regurgitation Doppler envelope  precludes estimation of RVSP.  10. Normal tricuspid valve. Minimal tricuspid  regurgitation.  *** Compared with echocardiogram report of 2/18/2014, no  significant changes noted.  ------------------------------------------------------------------------  Confirmed on  12/7/2018 - 13:49:43 by Shefali Gómez M.D.  ------------------------------------------------------------------------    < end of copied text >  ---------------------------------------------------------------------------------------------------------------    MICROBIOLOGY:     Culture - Sputum . (12.07.18 @ 08:34)    Gram Stain:   Rare polymorphonuclear leukocytes per low power field  Rare Squamous epithelial cells per low power field  Numerous Gram Positive Cocci in Pairs and Chains per oil power field    Specimen Source: .Sputum Sputum    Culture Results:   Normal Respiratory Samaria present    Culture - Sputum . (12.05.18 @ 22:50)    Gram Stain:   Moderate polymorphonuclear leukocytes per low power field  Few Squamous epithelial cells per low power field  Moderate Gram Positive Cocci in Pairs and Chains per oil power field    Specimen Source: .Sputum Sputum    Culture Results:   Normal Respiratory Samaria present    Rapid Respiratory Viral Panel (11.30.18 @ 01:30)    Rapid RVP Result: NotDete: The [employer] RVP Rapid uses polymerase chain reaction (PCR) and melt  curve analysis to screen for adenovirus; coronavirus HKU1, NL63, 229E,  OC43; human metapneumovirus (hMPV); human enterovirus/rhinovirus  (Entero/RV); influenza A; influenza A/H1;influenza A/H3; influenza  A/H1-2009; influenza B; parainfluenza viruses 1, 2, 3, 4; respiratory  syncytial virus; Bordetella pertussis; Mycoplasma pneumoniae; and  Chlamydophila pneumoniae.    RADIOLOGY:  [x] Chest radiographs reviewed and interpreted by me    < from: US Kidney and Bladder (12.17.18 @ 16:26) >    EXAM:  US KIDNEYS AND BLADDER                          PROCEDURE DATE:  12/17/2018      IMPRESSION:     Normal renal ultrasound.    MATT ZIMMER M.D., RADIOLOGY RESIDENT  This document has been electronically signed.  RAYMUNDO DUMONT M.D., ATTENDING RADIOLOGIST  This document has been electronically signed. Dec 17 2018  4:56PM    < end of copied text >  ---------------------------------------------------------------------------------------------------------------  < from: Xray Chest 1 View- PORTABLE-Routine (12.13.18 @ 13:10) >    EXAM:  XR CHEST PORTABLE ROUTINE 1V                          PROCEDURE DATE:  12/13/2018      INTERPRETATION:  HISTORY: Shortness of breath    TECHNIQUE: Portable frontal chest x-ray    COMPARISON: Chest x-ray from 11/29/2018    FINDINGS:    No focal consolidation.  There is no pneumothorax. There are no pleural effusions.   The cardiomediastinal silhouette cannot be adequately assessed on this   projection.    IMPRESSION:   Clear lungs.       JEANIE SPEARS M.D., RADIOLOGY RESIDENT  This document has been electronically signed.  JEYSON SANCHEZ M.D., ATTENDING RADIOLOGIST  This document has been electronically signed. Dec 13 2018  3:28PM      < end of copied text >  ---------------------------------------------------------------------------------------------------------------  < from: CT Angio Chest w/ IV Cont (12.04.18 @ 16:30) >    EXAM:  CT ANGIO CHEST (W)AW IC                          PROCEDURE DATE:  12/04/2018      INTERPRETATION:  CT CHEST WITH CONTRAST    INDICATION: Known COPD not responding to steroids and bronchodilators.   Progressively worsening dyspnea. Evaluate for pulmonary embolism.    TECHNIQUE: Enhanced helical images were obtained of the chest. Coronal   and sagittal images were reconstructed. Maximum intensity projection   images were generated. Images were obtained after the uneventful   administration of 60 cc nonionic intravenous Omnipaque 350.  40 cc of   Omnipaque 350 was discarded.    COMPARISON: CT chest 2/18/2014.    FINDINGS:     Lungs And Airways: Emphysematous changes of the lung.      The airways are unremarkable.      Pleura: No pneumothorax. No pleural effusion.    Mediastinum: There are no enlarged chest lymph nodes. The visualized   portion of the thyroid gland is unremarkable.       Heart and Vasculature: No pulmonary embolism.    The main pulmonary artery is normal in caliber. No thoracic aortic   aneurysm or dissection. Atheromatous disease of the aorta.    The heart is normal in size.  There is no pericardial effusion.   Coronary artery disease with calcified plaque involving the right and   left main coronary arteries and the left anterior descending artery.      Upper Abdomen: The upper abdomen is unremarkable.    Bones And Soft Tissues: Degenerative changes of the spine.  The soft   tissues are unremarkable.      IMPRESSION:   1.  No pulmonary embolism.  2. Emphysematous changes of the lung.    DIANA MEDINA M.D., RADIOLOGY RESIDENT  This document has been electronically signed.  JEYSON SANCHEZ M.D., ATTENDING RADIOLOGIST  This document has been electronically signed. Dec  4 2018  5:21PM      < end of copied text >  ---------------------------------------------------------------------------------------------------------------  < from: [facility] Duplex Lower Ext Vein Scan, Darius (12.06.18 @ 15:40) >    EXAM:  DUPLEX SCAN EXT VEINS LOWER BI                          PROCEDURE DATE:  12/06/2018      INTERPRETATION:  CLINICAL INFORMATION: Shortness of breath, leg pain and   swelling.    COMPARISON: Bilateral lower extremity venous duplex study dated 1/27/2009.    TECHNIQUE: Duplex sonography of the BILATERAL LOWER extremities with   color and spectral Doppler, with and without compression.      FINDINGS:    There is normal compressibility of the bilateral common femoral, femoral   and popliteal veins. No calf vein thrombosis is detected.    Doppler examination shows normal spontaneous and phasic flow.    IMPRESSION:     No evidence of bilateral lower extremity deep venous thrombosis.    JUSTIN ZAVALETA M.D., ATTENDING RADIOLOGIST  This document has been electronically signed. Dec  6 2018  4:03PM    < end of copied text >  ---------------------------------------------------------------------------------------------------------------    < from: [facility] Duplex Upper Ext Vein Scan, Bilat (12.12.18 @ 17:36) >    EXAM:  DUPLEX SCAN EXT VEINS UPPER BI                          PROCEDURE DATE:  12/12/2018      IMPRESSION:     No evidence of BILATERAL upper extremity deep venous thrombosis.    SNEHA KELSEY M.D., RADIOLOGY RESIDENT  This document has been electronically signed.  RAYMUNDO DUMONT M.D., ATTENDING RADIOLOGIST  This document has been electronically signed. Dec 13 2018  9:22AM      < end of copied text >  ---------------------------------------------------------------------------------------------------------------  SPIROMETRY:     FEV1 0.56 liters - 22% predicted  FVC 1.73 liters - 52% predicted  FEV1% 32    c/w very severe obstructive lung disease

## 2018-12-21 NOTE — PROGRESS NOTE ADULT - SUBJECTIVE AND OBJECTIVE BOX
Patient is a 58y old  Female who presents with a chief complaint of dyspnea (20 Dec 2018 15:42)      SUBJECTIVE / OVERNIGHT EVENTS:    MEDICATIONS  (STANDING):  ALBUTerol/ipratropium for Nebulization 3 milliLiter(s) Nebulizer <User Schedule>  artificial tears (preservative free) Ophthalmic Solution 1 Drop(s) Both EYES three times a day  aspirin enteric coated 81 milliGRAM(s) Oral daily  BACItracin   Ointment 1 Application(s) Topical two times a day  Biotene Dry Mouth Oral Rinse 5 milliLiter(s) Swish and Spit two times a day  buDESOnide   0.5 milliGRAM(s) Respule 0.5 milliGRAM(s) Inhalation every 12 hours  cholecalciferol 2000 Unit(s) Oral daily  dextrose 5%. 1000 milliLiter(s) (50 mL/Hr) IV Continuous <Continuous>  dextrose 50% Injectable 12.5 Gram(s) IV Push once  dextrose 50% Injectable 25 Gram(s) IV Push once  dextrose 50% Injectable 25 Gram(s) IV Push once  docusate sodium 100 milliGRAM(s) Oral daily  enoxaparin Injectable 40 milliGRAM(s) SubCutaneous every 24 hours  furosemide   Injectable 20 milliGRAM(s) IV Push daily  insulin glargine Injectable (LANTUS) 7 Unit(s) SubCutaneous at bedtime  insulin lispro (HumaLOG) corrective regimen sliding scale   SubCutaneous three times a day before meals  insulin lispro (HumaLOG) corrective regimen sliding scale   SubCutaneous at bedtime  insulin lispro Injectable (HumaLOG) 4 Unit(s) SubCutaneous before breakfast  insulin lispro Injectable (HumaLOG) 2 Unit(s) SubCutaneous before lunch  insulin lispro Injectable (HumaLOG) 2 Unit(s) SubCutaneous with dinner  isosorbide   mononitrate ER Tablet (IMDUR) 30 milliGRAM(s) Oral daily  melatonin 1 milliGRAM(s) Oral at bedtime  montelukast 10 milliGRAM(s) Oral daily  multivitamin 1 Tablet(s) Oral daily  nystatin    Suspension 505895 Unit(s) Oral four times a day  pantoprazole    Tablet 40 milliGRAM(s) Oral before breakfast  polyethylene glycol 3350 17 Gram(s) Oral daily  predniSONE   Tablet 30 milliGRAM(s) Oral <User Schedule>  Roflumilast (Daliresp) 250 MICROGram(s) 250 MICROGram(s) Oral daily  senna 2 Tablet(s) Oral at bedtime  theophylline ER Capsule 400 milliGRAM(s) Oral daily    MEDICATIONS  (PRN):  bisacodyl Suppository 10 milliGRAM(s) Rectal daily PRN Constipation  dextrose 40% Gel 15 Gram(s) Oral once PRN Blood Glucose LESS THAN 70 milliGRAM(s)/deciliter  glucagon  Injectable 1 milliGRAM(s) IntraMuscular once PRN Glucose LESS THAN 70 milligrams/deciliter  ondansetron Injectable 4 milliGRAM(s) IV Push every 8 hours PRN Nausea and/or Vomiting  sodium chloride 0.65% Nasal 1 Spray(s) Both Nostrils two times a day PRN Nasal Congestion      Vital Signs Last 24 Hrs  T(C): 36.3 (21 Dec 2018 06:31), Max: 36.8 (20 Dec 2018 20:36)  T(F): 97.3 (21 Dec 2018 06:31), Max: 98.3 (20 Dec 2018 20:36)  HR: 89 (21 Dec 2018 06:31) (88 - 110)  BP: 122/78 (21 Dec 2018 06:31) (122/78 - 147/78)  BP(mean): --  RR: 18 (21 Dec 2018 06:31) (18 - 20)  SpO2: 98% (21 Dec 2018 06:31) (96% - 100%)  CAPILLARY BLOOD GLUCOSE      POCT Blood Glucose.: 72 mg/dL (21 Dec 2018 08:35)  POCT Blood Glucose.: 306 mg/dL (20 Dec 2018 22:19)  POCT Blood Glucose.: 319 mg/dL (20 Dec 2018 18:56)  POCT Blood Glucose.: 262 mg/dL (20 Dec 2018 17:24)  POCT Blood Glucose.: 111 mg/dL (20 Dec 2018 12:43)    I&O's Summary    20 Dec 2018 07:01  -  21 Dec 2018 07:00  --------------------------------------------------------  IN: 880 mL / OUT: 0 mL / NET: 880 mL        PHYSICAL EXAM:  GENERAL: NAD, well-developed  HEAD:  Atraumatic, Normocephalic  EYES: EOMI, PERRLA, conjunctiva and sclera clear  NECK: Supple, No JVD  CHEST/LUNG: Clear to auscultation bilaterally; No wheeze  HEART: Regular rate and rhythm; No murmurs, rubs, or gallops  ABDOMEN: Soft, Nontender, Nondistended; Bowel sounds present  EXTREMITIES:  2+ Peripheral Pulses, No clubbing, cyanosis, or edema  PSYCH: AAOx3  NEUROLOGY: non-focal  SKIN: No rashes or lesions    LABS:                        12.3   13.1  )-----------( 127      ( 20 Dec 2018 14:35 )             35.0     12-20    127<L>  |  87<L>  |  36<H>  ----------------------------<  111<H>  4.8   |  29  |  0.84    Ca    8.9      20 Dec 2018 14:34                RADIOLOGY & ADDITIONAL TESTS:    Imaging Personally Reviewed:    Consultant(s) Notes Reviewed:      Care Discussed with Consultants/Other Providers: Patient is a 58y old  Female who presents with a chief complaint of dyspnea (20 Dec 2018 15:42)      SUBJECTIVE / OVERNIGHT EVENTS:  Pt seen and examined. No acute events overnight. She reports no improvement in dyspnea. Remains on bipap with intermittent 5L nasal cannula. She c/o that she feels like her dyspnea has been worse while on Roflumilast.     MEDICATIONS  (STANDING):  ALBUTerol/ipratropium for Nebulization 3 milliLiter(s) Nebulizer <User Schedule>  artificial tears (preservative free) Ophthalmic Solution 1 Drop(s) Both EYES three times a day  aspirin enteric coated 81 milliGRAM(s) Oral daily  BACItracin   Ointment 1 Application(s) Topical two times a day  Biotene Dry Mouth Oral Rinse 5 milliLiter(s) Swish and Spit two times a day  buDESOnide   0.5 milliGRAM(s) Respule 0.5 milliGRAM(s) Inhalation every 12 hours  cholecalciferol 2000 Unit(s) Oral daily  dextrose 5%. 1000 milliLiter(s) (50 mL/Hr) IV Continuous <Continuous>  dextrose 50% Injectable 12.5 Gram(s) IV Push once  dextrose 50% Injectable 25 Gram(s) IV Push once  dextrose 50% Injectable 25 Gram(s) IV Push once  docusate sodium 100 milliGRAM(s) Oral daily  enoxaparin Injectable 40 milliGRAM(s) SubCutaneous every 24 hours  furosemide   Injectable 20 milliGRAM(s) IV Push daily  insulin glargine Injectable (LANTUS) 7 Unit(s) SubCutaneous at bedtime  insulin lispro (HumaLOG) corrective regimen sliding scale   SubCutaneous three times a day before meals  insulin lispro (HumaLOG) corrective regimen sliding scale   SubCutaneous at bedtime  insulin lispro Injectable (HumaLOG) 4 Unit(s) SubCutaneous before breakfast  insulin lispro Injectable (HumaLOG) 2 Unit(s) SubCutaneous before lunch  insulin lispro Injectable (HumaLOG) 2 Unit(s) SubCutaneous with dinner  isosorbide   mononitrate ER Tablet (IMDUR) 30 milliGRAM(s) Oral daily  melatonin 1 milliGRAM(s) Oral at bedtime  montelukast 10 milliGRAM(s) Oral daily  multivitamin 1 Tablet(s) Oral daily  nystatin    Suspension 101919 Unit(s) Oral four times a day  pantoprazole    Tablet 40 milliGRAM(s) Oral before breakfast  polyethylene glycol 3350 17 Gram(s) Oral daily  predniSONE   Tablet 30 milliGRAM(s) Oral <User Schedule>  Roflumilast (Daliresp) 250 MICROGram(s) 250 MICROGram(s) Oral daily  senna 2 Tablet(s) Oral at bedtime  theophylline ER Capsule 400 milliGRAM(s) Oral daily    MEDICATIONS  (PRN):  bisacodyl Suppository 10 milliGRAM(s) Rectal daily PRN Constipation  dextrose 40% Gel 15 Gram(s) Oral once PRN Blood Glucose LESS THAN 70 milliGRAM(s)/deciliter  glucagon  Injectable 1 milliGRAM(s) IntraMuscular once PRN Glucose LESS THAN 70 milligrams/deciliter  ondansetron Injectable 4 milliGRAM(s) IV Push every 8 hours PRN Nausea and/or Vomiting  sodium chloride 0.65% Nasal 1 Spray(s) Both Nostrils two times a day PRN Nasal Congestion      Vital Signs Last 24 Hrs  T(C): 36.3 (21 Dec 2018 06:31), Max: 36.8 (20 Dec 2018 20:36)  T(F): 97.3 (21 Dec 2018 06:31), Max: 98.3 (20 Dec 2018 20:36)  HR: 89 (21 Dec 2018 06:31) (88 - 110)  BP: 122/78 (21 Dec 2018 06:31) (122/78 - 147/78)  BP(mean): --  RR: 18 (21 Dec 2018 06:31) (18 - 20)  SpO2: 98% (21 Dec 2018 06:31) (96% - 100%)  CAPILLARY BLOOD GLUCOSE      POCT Blood Glucose.: 72 mg/dL (21 Dec 2018 08:35)  POCT Blood Glucose.: 306 mg/dL (20 Dec 2018 22:19)  POCT Blood Glucose.: 319 mg/dL (20 Dec 2018 18:56)  POCT Blood Glucose.: 262 mg/dL (20 Dec 2018 17:24)  POCT Blood Glucose.: 111 mg/dL (20 Dec 2018 12:43)    I&O's Summary    20 Dec 2018 07:01  -  21 Dec 2018 07:00  --------------------------------------------------------  IN: 880 mL / OUT: 0 mL / NET: 880 mL        PHYSICAL EXAM:  GENERAL: NAD, anicteric, afebrile  HEAD:  Atraumatic, Normocephalic  EYES: EOMI, PERRLA, conjunctiva and sclera clear  NECK: Supple, No JVD  CHEST/LUNG: Clear to auscultation bilaterally; No wheeze  HEART: Regular rate and rhythm; No murmurs, rubs, or gallops  ABDOMEN: Soft, Nontender, Nondistended; Bowel sounds present  EXTREMITIES:  2+ Peripheral Pulses, b/l UE (improving) , b/l /LE edema  PSYCH: AAOx3  NEUROLOGY: non-focal  SKIN: No rashes or lesions      LABS:                        12.3   13.1  )-----------( 127      ( 20 Dec 2018 14:35 )             35.0     12-20    127<L>  |  87<L>  |  36<H>  ----------------------------<  111<H>  4.8   |  29  |  0.84    Ca    8.9      20 Dec 2018 14:34                Care Discussed with Consultants/Other Providers: Pulmonary ( Dr Fair), Cardiology ( Dr Brunson)

## 2018-12-21 NOTE — PROGRESS NOTE ADULT - ASSESSMENT
ASSESSMENT:    ongoing dyspnea with minimal exertion with chronic hypoxic and hypercapnic respiratory failure due to very severe COPD with "exacerbation" - adequate oxygenation on a nasal canula in the setting of profound dyspnea suggests that alterations in the mechanics of breathing due to lung hyperinflation and obesity are playing a significant role in her shortness of breath - there is evidence of CAD without pulmonary hypertension on the CT scan which is without pulmonary emboli or pneumonia - cardiac catheterization last year revealed patent stents - ECHO has a preserved LVEF, no significant valvular heart disease and no pulmonary hypertension - resolved AURORA and hyperkalemia - persistent hyponatremia - resolved respiratory acidosis with BIPAP support - lower extremity weakness and pain 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 perhaps exacerbated by statin use    extremity swelling likely related to steroid induced fluid retention and nocturnal hypoxemia with worsening of elevated pulmonary artery pressures - no evidence of DVT on upper or lower extremity Duplex examination    HTN/HLD/DM    CAD s/p PCI    PAD    PLAN/RECOMMENDATIONS:    BIPAP 12/5 with 40% FiO2 for sleep - on and off during the day for increased work of breathing or recurrent respiratory acidosis - unable to tolerate using a nasal pillow interface with the hospital BIPAP machine  oxygen supplementation to keep saturation greater than 92% using a nasal canula when off BIPAP  repeat ABG  sputum culture - no growth - has completed a course of biaxin  home trilogy vent has been arranged [facility]   albuterol/atrovent nebs q4h holding 2AM dose  pulmicort 0.5mg nebs q12h  singulair/theophylline/daliresp - check theophylline level    prednisone to 30mg daily - glucose control - nystatin - would continue to taper off by 10mg every 3 - 4 days given lack of improvement with systemic steroids and likely steroid related side effects  cardiology evaluation noted -  please confirm with them that adding azithromycin for its anti-inflammatory effects is not contraindicated by her history of CAD  cardiac meds: ASA/imdur/lasix - off crestor with possible myopathy - off norvasc which is perhaps exacerbating swelling - BP control  diurese as tolerated by renal function and hemodynamics - watch for worsening metabolic alkalosis with loop diuretic use which could lead to worsening hypercapnia  DVT prophylaxis - SQ lovenox  physical therapy as able  renal and endocrine evaluation noted  bowel regimen  TOMAS stockings  outpatient pulmonary rehabilitation  outpatient evaluation for lung transplantation    I will be away from Saturday 12/22 until Monday 12/30. My associates will be covering the service. Plan of care discussed with the patient at bedside and the medical team. I spoke to the [facility] transplant  today with the patient. They will consider transferring the patient to their facility although would prefer her to improve to the point that she could be evaluated in the outpatient setting. Alternatively, pretransplant testing might be permitted to be performed here.    David Fair MD, [facility] - [phone]  Pulmonary Medicine

## 2018-12-21 NOTE — PROGRESS NOTE ADULT - PROBLEM SELECTOR PLAN 1
c/w Prednisone  30mg po qd per Pulmonary reccs  Continue Pulmicort, Duoneb ATC   c/w Theophylline, Singulair.  Completed 7 days of biaxin   Home Trilogy vent arranged by pulmonary.  c/w intermittent bipap ; alternating with 5L NC  Pt reports more dyspnea since starting Roflumilast ; will discontinue  Pulm follow up with [facility]/transplant list  ABG not acidotic ; f/up theophylline level  d/w Cardiology re: starting Azithromycin per 2nd opinion Pulmonary reccs ( Dr Coyle); requesting EKG to evaluate QTc today  - PT reccs JACOBO ; pt declining. Per Pulmonary pt is stable for discharge.  - CM to start disposition planning.

## 2018-12-21 NOTE — PROGRESS NOTE ADULT - PROBLEM SELECTOR PLAN 3
A1C 7.2, but with recent hypoglycemic episodes ; Lantus dose decreased ; AM FS now improving  -c/w Lantus 7 units QHS  -Humalog decreased to 2 units TID w/meals (hold if not eating)  - Humalog moderate correction scale AC and Mod HS scale  - Continue to monitor blood sugars.  - Endocrine following A1C 7.2, but with recent hypoglycemic episodes ; Lantus dose decreased ; AM FS now improving  -c/w Lantus 7 units QHS  -Humalog decreased to 4-2- 2 units  w/meals (hold if not eating)  - Humalog moderate correction scale AC and Mod HS scale  - Continue to monitor blood sugars.  - Endocrine following

## 2018-12-21 NOTE — PROGRESS NOTE ADULT - ASSESSMENT
58 F PMH COPD on home O2 3L, HTN, HLD, CAD s/p x6 stents, T2DM, pHTN, PVD, p/w progressive worsening dyspnea x 2 weeks now complicated by chronic progressive hypoxic respiratory failure.     1. Chronic progressive hypoxic respiratory failure  multifactorial in the setting of worsening COPD, CAD, chronic diastolic CHF, pulmonary HTN   volume status improving   continue lasix 20mg IVP daily   echo with normal LV function, mild diastolic dysfunction, inadequate tricuspid regurg   cv stable no chest pain, no evidence of acute ischemia/ACS   bipap PRN   recommend repeat CXR to eval for pleural effusions   continue dueonebs, inhaled steroids, singulair/theophylline/daliresp, pulm f/u   EKG reviewed, corrected QT WNL (aprox 437-462), ok to start azithromycin    2. CAD s/p PCI  cv stable  continue asa, imdur    3. COPD   remains on bipap  continue Duoneb inhaled steroids, singulair/theophylline/daliresp, pulm f/u     4. hyponatremia/hyperkalemia  resolved   trend bmp  renal f/u     dvt ppx 58 F PMH COPD on home O2 3L, HTN, HLD, CAD s/p x6 stents, T2DM, pHTN, PVD, p/w progressive worsening dyspnea x 2 weeks now complicated by chronic progressive hypoxic respiratory failure.     1. Chronic progressive hypoxic respiratory failure  multifactorial in the setting of worsening COPD, CAD, chronic diastolic CHF, pulmonary HTN   volume status improving   continue lasix 20mg IVP daily   echo with normal LV function, mild diastolic dysfunction, inadequate tricuspid regurg   cv stable no chest pain, no evidence of acute ischemia/ACS   bipap PRN   recommend repeat CXR to eval for pleural effusions   continue dueonebs, inhaled steroids, singulair/theophylline/daliresp, pulm f/u   EKG reviewed, corrected QT WNL (aprox 462), ok to start azithromycin    2. CAD s/p PCI  cv stable  continue asa, imdur    3. COPD   remains on bipap  continue Duoneb inhaled steroids, singulair/theophylline/daliresp, pulm f/u     4. hyponatremia/hyperkalemia  resolved   trend bmp  renal f/u     dvt ppx

## 2018-12-21 NOTE — PROGRESS NOTE ADULT - ASSESSMENT
57 y/o F w/h/o controlled T2DM on Metformin 500mg bid. Also h/o CAD and COPD. 57 y/o F w/h/o controlled T2DM on Metformin 500mg bid. Also h/o CAD and COPD. Pt now on Prednisone 30mg daily w/tightly controlled BG values today. Had hyperglycemic spikes yesterday but dropped to 70s overnight. Will adjust regimen to prevent hypo/severe hyperglycemia. Pt encouraged to avoid concentrated sweets to prevent erratic glycemic control. BG goal (100-180mg/dl).

## 2018-12-21 NOTE — PROGRESS NOTE ADULT - SUBJECTIVE AND OBJECTIVE BOX
Diabetes Follow up note:  Interval Hx:  57 y/o F w/h/o controlled T2DM on Metformin 500mg bid. Also h/o CAD and COPD. Here with COPD exacerbation> now on Prednisone 30mg daily. BG values in 70-90s today. Pt denies hypoglycemic symptoms. Reports good appetite. Drinking regular soda/ginger ale today when at bedside.     Review of Systems:  General: "When is my swelling going to improve?"  GI: Tolerating POs without any N/V/D/ABD PAIN.  CV: No CP/SOB  ENDO: No S&Sx of hypoglycemia  MEDS:    insulin glargine Injectable (LANTUS) 7 Unit(s) SubCutaneous at bedtime  insulin lispro (HumaLOG) corrective regimen sliding scale   SubCutaneous three times a day before meals  insulin lispro (HumaLOG) corrective regimen sliding scale   SubCutaneous at bedtime  insulin lispro Injectable (HumaLOG) 4 Unit(s) SubCutaneous before breakfast  insulin lispro Injectable (HumaLOG) 2 Unit(s) SubCutaneous before lunch  insulin lispro Injectable (HumaLOG) 2 Unit(s) SubCutaneous with dinner  predniSONE   Tablet 30 milliGRAM(s) Oral <User Schedule>    nystatin    Suspension 487730 Unit(s) Oral four times a day    Allergies    No Known Allergies        PE:  General: Female sitting in chair. NAD.   Vital Signs Last 24 Hrs  T(C): 36.3 (21 Dec 2018 06:31), Max: 36.8 (20 Dec 2018 20:36)  T(F): 97.3 (21 Dec 2018 06:31), Max: 98.3 (20 Dec 2018 20:36)  HR: 100 (21 Dec 2018 11:51) (89 - 110)  BP: 96/66 (21 Dec 2018 11:51) (96/66 - 124/65)  BP(mean): --  RR: 18 (21 Dec 2018 06:31) (18 - 20)  SpO2: 98% (21 Dec 2018 09:00) (96% - 98%)  Abd: Soft, NT,ND,   Extremities: Warm. Bilateral LE/UE edema + 2  Neuro: A&O X3    LABS:    POCT Blood Glucose.: 98 mg/dL (12-21-18 @ 12:09)  POCT Blood Glucose.: 72 mg/dL (12-21-18 @ 08:35)  POCT Blood Glucose.: 306 mg/dL (12-20-18 @ 22:19)  POCT Blood Glucose.: 319 mg/dL (12-20-18 @ 18:56)  POCT Blood Glucose.: 262 mg/dL (12-20-18 @ 17:24)  POCT Blood Glucose.: 111 mg/dL (12-20-18 @ 12:43)  POCT Blood Glucose.: 114 mg/dL (12-20-18 @ 08:41)  POCT Blood Glucose.: 309 mg/dL (12-19-18 @ 22:07)  POCT Blood Glucose.: 193 mg/dL (12-19-18 @ 17:29)  POCT Blood Glucose.: 85 mg/dL (12-19-18 @ 12:51)  POCT Blood Glucose.: 75 mg/dL (12-19-18 @ 09:36)  POCT Blood Glucose.: 237 mg/dL (12-18-18 @ 22:11)  POCT Blood Glucose.: 153 mg/dL (12-18-18 @ 18:54)  POCT Blood Glucose.: 152 mg/dL (12-18-18 @ 17:24)  POCT Blood Glucose.: 127 mg/dL (12-18-18 @ 15:22)                            12.3   13.1  )-----------( 127      ( 20 Dec 2018 14:35 )             35.0       12-20    127<L>  |  87<L>  |  36<H>  ----------------------------<  111<H>  4.8   |  29  |  0.84    Ca    8.9      20 Dec 2018 14:34          Hemoglobin A1C, Whole Blood: 7.2 % <H> [4.0 - 5.6] (11-30-18 @ 07:58)            Contact number: kateryna [phone] or [phone]

## 2018-12-21 NOTE — PROGRESS NOTE ADULT - SUBJECTIVE AND OBJECTIVE BOX
CARDIOLOGY FOLLOW UP - Dr. Brunson    CC pt. sleeping, lying flat comfortably on nasal cannula       PHYSICAL EXAM:  T(C): 36.6 (12-21-18 @ 14:44), Max: 36.8 (12-20-18 @ 20:36)  HR: 100 (12-21-18 @ 14:44) (89 - 110)  BP: 160/65 (12-21-18 @ 14:44) (96/66 - 160/65)  RR: 18 (12-21-18 @ 14:44) (18 - 20)  SpO2: 100% (12-21-18 @ 14:44) (96% - 100%)  Wt(kg): --  I&O's Summary    20 Dec 2018 07:01  -  21 Dec 2018 07:00  --------------------------------------------------------  IN: 880 mL / OUT: 0 mL / NET: 880 mL    21 Dec 2018 07:01  -  21 Dec 2018 17:10  --------------------------------------------------------  IN: 700 mL / OUT: 0 mL / NET: 700 mL        Appearance: Normal	  Cardiovascular: Normal S1 S2,RRR, No JVD, No murmurs  Respiratory: Lungs clear to auscultation	  Gastrointestinal:  Soft, Non-tender, + BS	  Extremities: Normal range of motion, No clubbing, b/l LE edema         MEDICATIONS  (STANDING):  ALBUTerol/ipratropium for Nebulization 3 milliLiter(s) Nebulizer <User Schedule>  artificial tears (preservative free) Ophthalmic Solution 1 Drop(s) Both EYES three times a day  aspirin enteric coated 81 milliGRAM(s) Oral daily  BACItracin   Ointment 1 Application(s) Topical two times a day  Biotene Dry Mouth Oral Rinse 5 milliLiter(s) Swish and Spit two times a day  buDESOnide   0.5 milliGRAM(s) Respule 0.5 milliGRAM(s) Inhalation every 12 hours  cholecalciferol 2000 Unit(s) Oral daily  dextrose 5%. 1000 milliLiter(s) (50 mL/Hr) IV Continuous <Continuous>  dextrose 50% Injectable 12.5 Gram(s) IV Push once  dextrose 50% Injectable 25 Gram(s) IV Push once  dextrose 50% Injectable 25 Gram(s) IV Push once  docusate sodium 100 milliGRAM(s) Oral daily  enoxaparin Injectable 40 milliGRAM(s) SubCutaneous every 24 hours  furosemide   Injectable 20 milliGRAM(s) IV Push daily  insulin glargine Injectable (LANTUS) 4 Unit(s) SubCutaneous at bedtime  insulin lispro (HumaLOG) corrective regimen sliding scale   SubCutaneous three times a day before meals  insulin lispro (HumaLOG) corrective regimen sliding scale   SubCutaneous at bedtime  insulin lispro Injectable (HumaLOG) 2 Unit(s) SubCutaneous with dinner  isosorbide   mononitrate ER Tablet (IMDUR) 30 milliGRAM(s) Oral daily  melatonin 1 milliGRAM(s) Oral at bedtime  montelukast 10 milliGRAM(s) Oral daily  multivitamin 1 Tablet(s) Oral daily  nystatin    Suspension 774312 Unit(s) Oral four times a day  pantoprazole    Tablet 40 milliGRAM(s) Oral before breakfast  polyethylene glycol 3350 17 Gram(s) Oral daily  predniSONE   Tablet 30 milliGRAM(s) Oral <User Schedule>  Roflumilast (Daliresp) 250 MICROGram(s) 250 MICROGram(s) Oral daily  senna 2 Tablet(s) Oral at bedtime  theophylline ER Capsule 400 milliGRAM(s) Oral daily      TELEMETRY: 	    ECG:  	NSR 99, corrected QT aprox 437 - 462  RADIOLOGY:   DIAGNOSTIC TESTING:  [ ] Echocardiogram:  [ ]  Catheterization:  [ ] Stress Test:    OTHER: 	    LABS:	 	                                12.3   13.1  )-----------( 127      ( 20 Dec 2018 14:35 )             35.0     12-20    127<L>  |  87<L>  |  36<H>  ----------------------------<  111<H>  4.8   |  29  |  0.84    Ca    8.9      20 Dec 2018 14:34 CARDIOLOGY FOLLOW UP - Dr. Brunson    CC pt. sleeping, lying flat comfortably on nasal cannula       PHYSICAL EXAM:  T(C): 36.6 (12-21-18 @ 14:44), Max: 36.8 (12-20-18 @ 20:36)  HR: 100 (12-21-18 @ 14:44) (89 - 110)  BP: 160/65 (12-21-18 @ 14:44) (96/66 - 160/65)  RR: 18 (12-21-18 @ 14:44) (18 - 20)  SpO2: 100% (12-21-18 @ 14:44) (96% - 100%)  Wt(kg): --  I&O's Summary    20 Dec 2018 07:01  -  21 Dec 2018 07:00  --------------------------------------------------------  IN: 880 mL / OUT: 0 mL / NET: 880 mL    21 Dec 2018 07:01  -  21 Dec 2018 17:10  --------------------------------------------------------  IN: 700 mL / OUT: 0 mL / NET: 700 mL        Appearance: Normal	  Cardiovascular: Normal S1 S2,RRR, No JVD, No murmurs  Respiratory: Lungs clear to auscultation	  Gastrointestinal:  Soft, Non-tender, + BS	  Extremities: Normal range of motion, No clubbing, b/l LE edema         MEDICATIONS  (STANDING):  ALBUTerol/ipratropium for Nebulization 3 milliLiter(s) Nebulizer <User Schedule>  artificial tears (preservative free) Ophthalmic Solution 1 Drop(s) Both EYES three times a day  aspirin enteric coated 81 milliGRAM(s) Oral daily  BACItracin   Ointment 1 Application(s) Topical two times a day  Biotene Dry Mouth Oral Rinse 5 milliLiter(s) Swish and Spit two times a day  buDESOnide   0.5 milliGRAM(s) Respule 0.5 milliGRAM(s) Inhalation every 12 hours  cholecalciferol 2000 Unit(s) Oral daily  dextrose 5%. 1000 milliLiter(s) (50 mL/Hr) IV Continuous <Continuous>  dextrose 50% Injectable 12.5 Gram(s) IV Push once  dextrose 50% Injectable 25 Gram(s) IV Push once  dextrose 50% Injectable 25 Gram(s) IV Push once  docusate sodium 100 milliGRAM(s) Oral daily  enoxaparin Injectable 40 milliGRAM(s) SubCutaneous every 24 hours  furosemide   Injectable 20 milliGRAM(s) IV Push daily  insulin glargine Injectable (LANTUS) 4 Unit(s) SubCutaneous at bedtime  insulin lispro (HumaLOG) corrective regimen sliding scale   SubCutaneous three times a day before meals  insulin lispro (HumaLOG) corrective regimen sliding scale   SubCutaneous at bedtime  insulin lispro Injectable (HumaLOG) 2 Unit(s) SubCutaneous with dinner  isosorbide   mononitrate ER Tablet (IMDUR) 30 milliGRAM(s) Oral daily  melatonin 1 milliGRAM(s) Oral at bedtime  montelukast 10 milliGRAM(s) Oral daily  multivitamin 1 Tablet(s) Oral daily  nystatin    Suspension 151082 Unit(s) Oral four times a day  pantoprazole    Tablet 40 milliGRAM(s) Oral before breakfast  polyethylene glycol 3350 17 Gram(s) Oral daily  predniSONE   Tablet 30 milliGRAM(s) Oral <User Schedule>  Roflumilast (Daliresp) 250 MICROGram(s) 250 MICROGram(s) Oral daily  senna 2 Tablet(s) Oral at bedtime  theophylline ER Capsule 400 milliGRAM(s) Oral daily      TELEMETRY: 	    ECG:  	NSR 99, corrected QT aprox 462  RADIOLOGY:   DIAGNOSTIC TESTING:  [ ] Echocardiogram:  [ ]  Catheterization:  [ ] Stress Test:    OTHER: 	    LABS:	 	                                12.3   13.1  )-----------( 127      ( 20 Dec 2018 14:35 )             35.0     12-20    127<L>  |  87<L>  |  36<H>  ----------------------------<  111<H>  4.8   |  29  |  0.84    Ca    8.9      20 Dec 2018 14:34

## 2018-12-22 LAB
ANION GAP SERPL CALC-SCNC: 9 MMOL/L — SIGNIFICANT CHANGE UP (ref 5–17)
BUN SERPL-MCNC: 36 MG/DL — HIGH (ref 7–23)
CALCIUM SERPL-MCNC: 9.1 MG/DL — SIGNIFICANT CHANGE UP (ref 8.4–10.5)
CHLORIDE SERPL-SCNC: 89 MMOL/L — LOW (ref 96–108)
CO2 SERPL-SCNC: 35 MMOL/L — HIGH (ref 22–31)
CREAT SERPL-MCNC: 0.9 MG/DL — SIGNIFICANT CHANGE UP (ref 0.5–1.3)
GLUCOSE BLDC GLUCOMTR-MCNC: 127 MG/DL — HIGH (ref 70–99)
GLUCOSE BLDC GLUCOMTR-MCNC: 141 MG/DL — HIGH (ref 70–99)
GLUCOSE BLDC GLUCOMTR-MCNC: 236 MG/DL — HIGH (ref 70–99)
GLUCOSE BLDC GLUCOMTR-MCNC: 292 MG/DL — HIGH (ref 70–99)
GLUCOSE SERPL-MCNC: 292 MG/DL — HIGH (ref 70–99)
MAGNESIUM SERPL-MCNC: 1.8 MG/DL — SIGNIFICANT CHANGE UP (ref 1.6–2.6)
PHOSPHATE SERPL-MCNC: 3.3 MG/DL — SIGNIFICANT CHANGE UP (ref 2.5–4.5)
POTASSIUM SERPL-MCNC: 4.1 MMOL/L — SIGNIFICANT CHANGE UP (ref 3.5–5.3)
POTASSIUM SERPL-SCNC: 4.1 MMOL/L — SIGNIFICANT CHANGE UP (ref 3.5–5.3)
SODIUM SERPL-SCNC: 133 MMOL/L — LOW (ref 135–145)

## 2018-12-22 PROCEDURE — 99233 SBSQ HOSP IP/OBS HIGH 50: CPT

## 2018-12-22 RX ORDER — MAGNESIUM SULFATE 500 MG/ML
1 VIAL (ML) INJECTION ONCE
Qty: 0 | Refills: 0 | Status: COMPLETED | OUTPATIENT
Start: 2018-12-22 | End: 2018-12-23

## 2018-12-22 RX ORDER — SIMETHICONE 80 MG/1
80 TABLET, CHEWABLE ORAL ONCE
Qty: 0 | Refills: 0 | Status: DISCONTINUED | OUTPATIENT
Start: 2018-12-22 | End: 2019-01-15

## 2018-12-22 RX ADMIN — MONTELUKAST 10 MILLIGRAM(S): 4 TABLET, CHEWABLE ORAL at 12:55

## 2018-12-22 RX ADMIN — Medication 500000 UNIT(S): at 06:40

## 2018-12-22 RX ADMIN — Medication 3 MILLILITER(S): at 09:28

## 2018-12-22 RX ADMIN — Medication 20 MILLIGRAM(S): at 06:41

## 2018-12-22 RX ADMIN — Medication 500000 UNIT(S): at 18:18

## 2018-12-22 RX ADMIN — Medication 500000 UNIT(S): at 22:10

## 2018-12-22 RX ADMIN — Medication 1 DROP(S): at 13:05

## 2018-12-22 RX ADMIN — Medication 400 MILLIGRAM(S): at 09:28

## 2018-12-22 RX ADMIN — Medication 3 UNIT(S): at 12:59

## 2018-12-22 RX ADMIN — Medication 0.5 MILLIGRAM(S): at 16:36

## 2018-12-22 RX ADMIN — Medication 2 UNIT(S): at 18:17

## 2018-12-22 RX ADMIN — INSULIN GLARGINE 4 UNIT(S): 100 INJECTION, SOLUTION SUBCUTANEOUS at 21:52

## 2018-12-22 RX ADMIN — Medication 500000 UNIT(S): at 01:09

## 2018-12-22 RX ADMIN — PANTOPRAZOLE SODIUM 40 MILLIGRAM(S): 20 TABLET, DELAYED RELEASE ORAL at 06:41

## 2018-12-22 RX ADMIN — Medication 500000 UNIT(S): at 12:55

## 2018-12-22 RX ADMIN — SENNA PLUS 2 TABLET(S): 8.6 TABLET ORAL at 22:00

## 2018-12-22 RX ADMIN — ISOSORBIDE MONONITRATE 30 MILLIGRAM(S): 60 TABLET, EXTENDED RELEASE ORAL at 12:55

## 2018-12-22 RX ADMIN — ENOXAPARIN SODIUM 40 MILLIGRAM(S): 100 INJECTION SUBCUTANEOUS at 21:55

## 2018-12-22 RX ADMIN — AZITHROMYCIN 250 MILLIGRAM(S): 500 TABLET, FILM COATED ORAL at 09:41

## 2018-12-22 RX ADMIN — Medication 1 TABLET(S): at 12:55

## 2018-12-22 RX ADMIN — Medication 3 MILLILITER(S): at 13:05

## 2018-12-22 RX ADMIN — Medication 2: at 18:16

## 2018-12-22 RX ADMIN — Medication 1 APPLICATION(S): at 06:41

## 2018-12-22 RX ADMIN — Medication 3 MILLILITER(S): at 06:41

## 2018-12-22 RX ADMIN — Medication 3 MILLILITER(S): at 21:52

## 2018-12-22 RX ADMIN — Medication 1 DROP(S): at 21:59

## 2018-12-22 RX ADMIN — Medication 3 MILLILITER(S): at 16:03

## 2018-12-22 RX ADMIN — Medication 1: at 21:53

## 2018-12-22 RX ADMIN — Medication 3 UNIT(S): at 09:26

## 2018-12-22 RX ADMIN — Medication 0.5 MILLIGRAM(S): at 06:41

## 2018-12-22 RX ADMIN — Medication 100 MILLIGRAM(S): at 12:54

## 2018-12-22 RX ADMIN — Medication 2000 UNIT(S): at 12:54

## 2018-12-22 RX ADMIN — ONDANSETRON 4 MILLIGRAM(S): 8 TABLET, FILM COATED ORAL at 18:24

## 2018-12-22 RX ADMIN — Medication 30 MILLIGRAM(S): at 09:29

## 2018-12-22 RX ADMIN — Medication 81 MILLIGRAM(S): at 12:54

## 2018-12-22 RX ADMIN — Medication 1 DROP(S): at 06:40

## 2018-12-22 NOTE — PROGRESS NOTE ADULT - SUBJECTIVE AND OBJECTIVE BOX
Patient is a 58y old  Female who presents with a chief complaint of dyspnea (22 Dec 2018 13:17)      SUBJECTIVE / OVERNIGHT EVENTS: Still dyspneic, can't get out of bed, reports worsening edema, no cp, had bm yesterday, no n/v    MEDICATIONS  (STANDING):  ALBUTerol/ipratropium for Nebulization 3 milliLiter(s) Nebulizer <User Schedule>  artificial tears (preservative free) Ophthalmic Solution 1 Drop(s) Both EYES three times a day  aspirin enteric coated 81 milliGRAM(s) Oral daily  azithromycin   Tablet 250 milliGRAM(s) Oral <User Schedule>  Biotene Dry Mouth Oral Rinse 5 milliLiter(s) Swish and Spit two times a day  buDESOnide   0.5 milliGRAM(s) Respule 0.5 milliGRAM(s) Inhalation every 12 hours  cholecalciferol 2000 Unit(s) Oral daily  dextrose 5%. 1000 milliLiter(s) (50 mL/Hr) IV Continuous <Continuous>  dextrose 50% Injectable 12.5 Gram(s) IV Push once  dextrose 50% Injectable 25 Gram(s) IV Push once  dextrose 50% Injectable 25 Gram(s) IV Push once  docusate sodium 100 milliGRAM(s) Oral daily  enoxaparin Injectable 40 milliGRAM(s) SubCutaneous every 24 hours  furosemide   Injectable 20 milliGRAM(s) IV Push daily  insulin glargine Injectable (LANTUS) 4 Unit(s) SubCutaneous at bedtime  insulin lispro (HumaLOG) corrective regimen sliding scale   SubCutaneous three times a day before meals  insulin lispro (HumaLOG) corrective regimen sliding scale   SubCutaneous at bedtime  insulin lispro Injectable (HumaLOG) 2 Unit(s) SubCutaneous with dinner  insulin lispro Injectable (HumaLOG) 3 Unit(s) SubCutaneous before breakfast  insulin lispro Injectable (HumaLOG) 3 Unit(s) SubCutaneous before lunch  isosorbide   mononitrate ER Tablet (IMDUR) 30 milliGRAM(s) Oral daily  melatonin 1 milliGRAM(s) Oral at bedtime  montelukast 10 milliGRAM(s) Oral daily  multivitamin 1 Tablet(s) Oral daily  nystatin    Suspension 719768 Unit(s) Oral four times a day  pantoprazole    Tablet 40 milliGRAM(s) Oral before breakfast  polyethylene glycol 3350 17 Gram(s) Oral daily  predniSONE   Tablet 30 milliGRAM(s) Oral <User Schedule>  Roflumilast (Daliresp) 250 MICROGram(s) 250 MICROGram(s) Oral daily  senna 2 Tablet(s) Oral at bedtime  theophylline ER Capsule 400 milliGRAM(s) Oral daily    MEDICATIONS  (PRN):  bisacodyl Suppository 10 milliGRAM(s) Rectal daily PRN Constipation  dextrose 40% Gel 15 Gram(s) Oral once PRN Blood Glucose LESS THAN 70 milliGRAM(s)/deciliter  glucagon  Injectable 1 milliGRAM(s) IntraMuscular once PRN Glucose LESS THAN 70 milligrams/deciliter  ondansetron Injectable 4 milliGRAM(s) IV Push every 8 hours PRN Nausea and/or Vomiting  sodium chloride 0.65% Nasal 1 Spray(s) Both Nostrils two times a day PRN Nasal Congestion        CAPILLARY BLOOD GLUCOSE      POCT Blood Glucose.: 127 mg/dL (22 Dec 2018 12:50)  POCT Blood Glucose.: 141 mg/dL (22 Dec 2018 08:52)  POCT Blood Glucose.: 214 mg/dL (21 Dec 2018 22:11)  POCT Blood Glucose.: 260 mg/dL (21 Dec 2018 17:22)    I&O's Summary    21 Dec 2018 07:01  -  22 Dec 2018 07:00  --------------------------------------------------------  IN: 1600 mL / OUT: 0 mL / NET: 1600 mL        PHYSICAL EXAM:  GENERAL: NAD, well-developed  HEAD:  Atraumatic, Normocephalic  EYES: conjunctiva and sclera clear  NECK:  No JVD  CHEST: S1S2 RRR  LUNG: decreased breath sounds bl  ABDOMEN: Soft, Nontender, Nondistended; Bowel sounds present  EXTREMITIES: +1-2 edema UE and LE  PSYCH: AAOx3  NEUROLOGY: non-focal  SKIN: No rashes or lesions    LABS:                        12.3   13.1  )-----------( 127      ( 20 Dec 2018 14:35 )             35.0     12-20    127<L>  |  87<L>  |  36<H>  ----------------------------<  111<H>  4.8   |  29  |  0.84    Ca    8.9      20 Dec 2018 14:34                RADIOLOGY & ADDITIONAL TESTS:    Imaging Personally Reviewed:    Consultant(s) Notes Reviewed:  pulm    Care Discussed with Consultants/Other Providers:

## 2018-12-22 NOTE — PROGRESS NOTE ADULT - SUBJECTIVE AND OBJECTIVE BOX
Afebrile. O2 sat 100% on 5 liters nasal O2.  Dyspneic on very mild exertion, and sometimes at rest.  Wearing BiPAP at nite (and PRN during daytime).  Infrequent cough and sputum production.   No wheezing. No chest pain.  Complains of leg weakness.    Vital Signs Last 24 Hrs  T(C): 36.6 (22 Dec 2018 06:33), Max: 36.6 (21 Dec 2018 14:44)  T(F): 97.8 (22 Dec 2018 06:33), Max: 97.9 (21 Dec 2018 14:44)  HR: 84 (22 Dec 2018 11:51) (75 - 100)  BP: 136/80 (22 Dec 2018 06:33) (117/74 - 160/65)  BP(mean): --  RR: 18 (22 Dec 2018 06:33) (18 - 18)  SpO2: 98% (22 Dec 2018 11:51) (96% - 100%)    Medications:  MEDICATIONS  (STANDING):  ALBUTerol/ipratropium for Nebulization 3 milliLiter(s) Nebulizer <User Schedule>  artificial tears (preservative free) Ophthalmic Solution 1 Drop(s) Both EYES three times a day  aspirin enteric coated 81 milliGRAM(s) Oral daily  azithromycin   Tablet 250 milliGRAM(s) Oral <User Schedule>  Biotene Dry Mouth Oral Rinse 5 milliLiter(s) Swish and Spit two times a day  buDESOnide   0.5 milliGRAM(s) Respule 0.5 milliGRAM(s) Inhalation every 12 hours  cholecalciferol 2000 Unit(s) Oral daily  dextrose 5%. 1000 milliLiter(s) (50 mL/Hr) IV Continuous <Continuous>  dextrose 50% Injectable 12.5 Gram(s) IV Push once  dextrose 50% Injectable 25 Gram(s) IV Push once  dextrose 50% Injectable 25 Gram(s) IV Push once  docusate sodium 100 milliGRAM(s) Oral daily  enoxaparin Injectable 40 milliGRAM(s) SubCutaneous every 24 hours  furosemide   Injectable 20 milliGRAM(s) IV Push daily  insulin glargine Injectable (LANTUS) 4 Unit(s) SubCutaneous at bedtime  insulin lispro (HumaLOG) corrective regimen sliding scale   SubCutaneous three times a day before meals  insulin lispro (HumaLOG) corrective regimen sliding scale   SubCutaneous at bedtime  insulin lispro Injectable (HumaLOG) 2 Unit(s) SubCutaneous with dinner  insulin lispro Injectable (HumaLOG) 3 Unit(s) SubCutaneous before breakfast  insulin lispro Injectable (HumaLOG) 3 Unit(s) SubCutaneous before lunch  isosorbide   mononitrate ER Tablet (IMDUR) 30 milliGRAM(s) Oral daily  melatonin 1 milliGRAM(s) Oral at bedtime  montelukast 10 milliGRAM(s) Oral daily  multivitamin 1 Tablet(s) Oral daily  nystatin    Suspension 600304 Unit(s) Oral four times a day  pantoprazole    Tablet 40 milliGRAM(s) Oral before breakfast  polyethylene glycol 3350 17 Gram(s) Oral daily  predniSONE   Tablet 30 milliGRAM(s) Oral <User Schedule>  Roflumilast (Daliresp) 250 MICROGram(s) 250 MICROGram(s) Oral daily  senna 2 Tablet(s) Oral at bedtime  theophylline ER Capsule 400 milliGRAM(s) Oral daily    MEDICATIONS  (PRN):  bisacodyl Suppository 10 milliGRAM(s) Rectal daily PRN Constipation  dextrose 40% Gel 15 Gram(s) Oral once PRN Blood Glucose LESS THAN 70 milliGRAM(s)/deciliter  glucagon  Injectable 1 milliGRAM(s) IntraMuscular once PRN Glucose LESS THAN 70 milligrams/deciliter  ondansetron Injectable 4 milliGRAM(s) IV Push every 8 hours PRN Nausea and/or Vomiting  sodium chloride 0.65% Nasal 1 Spray(s) Both Nostrils two times a day PRN Nasal Congestion      Allergies    No Known Allergies    Intolerances        Physical Examination:  Pleasant. Slightly dyspneic and tachypneic with long conversations on nasal O2.  Neck: no JVD, LAD, accessory muscle use  PULM: Distant breath sounds bilaterally. Prolonged expiratory phase. No wheezes, rhonchi, or rales.  CVS: RR  Abdomen: nontender  Extremities: + LE edema    Plan:  1. Continue Duoneb/Budesonide nebulizer tx.  2. Continue Singulair, Daliresp, and Theophylline.  3. On Prednisone 30mg PO daily.  4. Continue nocturnal BiPAP (12/5 with 40% FiO2).  5. Titrate nasal O2 during daytime to keep O2 sat ~ 95%.  6. On Protonix and SQ Lovenox.   7. OOB ---> chair.  8. Diuresis as per Cardiology. Monitor electrolytes and renal function closely.  9. Check Theophylline level.  10.On Azithromycin for anti-inflammatory effects.  11.PTx as able.  12.? Transfer to [facility] Lung Transplant team.    POC: David Caban M.D.  [phone] Afebrile. O2 sat 100% on 5 liters nasal O2.  Dyspneic on very mild exertion, and sometimes at rest.  Wearing BiPAP at nite (and PRN during daytime).  Infrequent cough and sputum production.   No wheezing. No chest pain.  Complains of leg weakness and lethargy.    Vital Signs Last 24 Hrs  T(C): 36.6 (22 Dec 2018 06:33), Max: 36.6 (21 Dec 2018 14:44)  T(F): 97.8 (22 Dec 2018 06:33), Max: 97.9 (21 Dec 2018 14:44)  HR: 84 (22 Dec 2018 11:51) (75 - 100)  BP: 136/80 (22 Dec 2018 06:33) (117/74 - 160/65)  BP(mean): --  RR: 18 (22 Dec 2018 06:33) (18 - 18)  SpO2: 98% (22 Dec 2018 11:51) (96% - 100%)    Medications:  MEDICATIONS  (STANDING):  ALBUTerol/ipratropium for Nebulization 3 milliLiter(s) Nebulizer <User Schedule>  artificial tears (preservative free) Ophthalmic Solution 1 Drop(s) Both EYES three times a day  aspirin enteric coated 81 milliGRAM(s) Oral daily  azithromycin   Tablet 250 milliGRAM(s) Oral <User Schedule>  Biotene Dry Mouth Oral Rinse 5 milliLiter(s) Swish and Spit two times a day  buDESOnide   0.5 milliGRAM(s) Respule 0.5 milliGRAM(s) Inhalation every 12 hours  cholecalciferol 2000 Unit(s) Oral daily  dextrose 5%. 1000 milliLiter(s) (50 mL/Hr) IV Continuous <Continuous>  dextrose 50% Injectable 12.5 Gram(s) IV Push once  dextrose 50% Injectable 25 Gram(s) IV Push once  dextrose 50% Injectable 25 Gram(s) IV Push once  docusate sodium 100 milliGRAM(s) Oral daily  enoxaparin Injectable 40 milliGRAM(s) SubCutaneous every 24 hours  furosemide   Injectable 20 milliGRAM(s) IV Push daily  insulin glargine Injectable (LANTUS) 4 Unit(s) SubCutaneous at bedtime  insulin lispro (HumaLOG) corrective regimen sliding scale   SubCutaneous three times a day before meals  insulin lispro (HumaLOG) corrective regimen sliding scale   SubCutaneous at bedtime  insulin lispro Injectable (HumaLOG) 2 Unit(s) SubCutaneous with dinner  insulin lispro Injectable (HumaLOG) 3 Unit(s) SubCutaneous before breakfast  insulin lispro Injectable (HumaLOG) 3 Unit(s) SubCutaneous before lunch  isosorbide   mononitrate ER Tablet (IMDUR) 30 milliGRAM(s) Oral daily  melatonin 1 milliGRAM(s) Oral at bedtime  montelukast 10 milliGRAM(s) Oral daily  multivitamin 1 Tablet(s) Oral daily  nystatin    Suspension 795748 Unit(s) Oral four times a day  pantoprazole    Tablet 40 milliGRAM(s) Oral before breakfast  polyethylene glycol 3350 17 Gram(s) Oral daily  predniSONE   Tablet 30 milliGRAM(s) Oral <User Schedule>  Roflumilast (Daliresp) 250 MICROGram(s) 250 MICROGram(s) Oral daily  senna 2 Tablet(s) Oral at bedtime  theophylline ER Capsule 400 milliGRAM(s) Oral daily    MEDICATIONS  (PRN):  bisacodyl Suppository 10 milliGRAM(s) Rectal daily PRN Constipation  dextrose 40% Gel 15 Gram(s) Oral once PRN Blood Glucose LESS THAN 70 milliGRAM(s)/deciliter  glucagon  Injectable 1 milliGRAM(s) IntraMuscular once PRN Glucose LESS THAN 70 milligrams/deciliter  ondansetron Injectable 4 milliGRAM(s) IV Push every 8 hours PRN Nausea and/or Vomiting  sodium chloride 0.65% Nasal 1 Spray(s) Both Nostrils two times a day PRN Nasal Congestion      Allergies    No Known Allergies    Intolerances        Physical Examination:  Pleasant. Slightly dyspneic and tachypneic with long conversations on nasal O2.  Neck: no JVD, LAD, accessory muscle use  PULM: Distant breath sounds bilaterally. Prolonged expiratory phase. No wheezes, rhonchi, or rales.  CVS: RR  Abdomen: nontender  Extremities: + LE edema    Plan:  1. Continue Duoneb/Budesonide nebulizer tx.  2. Continue Singulair, Daliresp, and Theophylline.  3. On Prednisone 30mg PO daily.  4. Continue nocturnal BiPAP (12/5 with 40% FiO2).  5. Titrate nasal O2 during daytime to keep O2 sat ~ 95%.  6. On Protonix and SQ Lovenox.   7. OOB ---> chair.  8. Diuresis as per Cardiology. Monitor electrolytes and renal function closely.  9. Check Theophylline level.  10.On Azithromycin for anti-inflammatory effects.  11.PTx as able.  12.? Transfer to [facility] Lung Transplant team.    POC: David Caban M.D.  [phone]

## 2018-12-22 NOTE — PROGRESS NOTE ADULT - PROBLEM SELECTOR PLAN 3
A1C 7.2, but with recent hypoglycemic episodes ; Lantus dose decreased ; AM FS now improving  -c/w Lantus 7 units QHS  -Humalog decreased to 4-2- 2 units  w/meals (hold if not eating)  - Humalog moderate correction scale AC and Mod HS scale  - Continue to monitor blood sugars.  - Endocrine following

## 2018-12-22 NOTE — PROGRESS NOTE ADULT - PROBLEM SELECTOR PLAN 1
c/w Prednisone  30mg po qd per Pulmonary reccs  Continue Pulmicort, Duoneb ATC   c/w Theophylline, Singulair.  Completed 7 days of biaxin   Home Trilogy vent arranged by pulmonary.  c/w intermittent bipap ; alternating with 5L NC  Pt reports more dyspnea since starting Roflumilast ; will discontinue  Pulm follow up with [place]/transplant list  ABG not acidotic ; f/up theophylline level  cw azithromycin   - PT reccs JACOBO ; pt declining. Per Pulmonary pt is stable for discharge.  - CM to start disposition planning.

## 2018-12-23 LAB
ANION GAP SERPL CALC-SCNC: 7 MMOL/L — SIGNIFICANT CHANGE UP (ref 5–17)
BUN SERPL-MCNC: 31 MG/DL — HIGH (ref 7–23)
CALCIUM SERPL-MCNC: 9.1 MG/DL — SIGNIFICANT CHANGE UP (ref 8.4–10.5)
CHLORIDE SERPL-SCNC: 89 MMOL/L — LOW (ref 96–108)
CO2 SERPL-SCNC: 37 MMOL/L — HIGH (ref 22–31)
CREAT SERPL-MCNC: 0.84 MG/DL — SIGNIFICANT CHANGE UP (ref 0.5–1.3)
GLUCOSE BLDC GLUCOMTR-MCNC: 100 MG/DL — HIGH (ref 70–99)
GLUCOSE BLDC GLUCOMTR-MCNC: 133 MG/DL — HIGH (ref 70–99)
GLUCOSE BLDC GLUCOMTR-MCNC: 212 MG/DL — HIGH (ref 70–99)
GLUCOSE BLDC GLUCOMTR-MCNC: 227 MG/DL — HIGH (ref 70–99)
GLUCOSE SERPL-MCNC: 102 MG/DL — HIGH (ref 70–99)
MAGNESIUM SERPL-MCNC: 2.2 MG/DL — SIGNIFICANT CHANGE UP (ref 1.6–2.6)
POTASSIUM SERPL-MCNC: 4 MMOL/L — SIGNIFICANT CHANGE UP (ref 3.5–5.3)
POTASSIUM SERPL-SCNC: 4 MMOL/L — SIGNIFICANT CHANGE UP (ref 3.5–5.3)
SODIUM SERPL-SCNC: 133 MMOL/L — LOW (ref 135–145)
THEOPHYLLINE SERPL-MCNC: 3.1 UG/ML — LOW (ref 10–20)

## 2018-12-23 PROCEDURE — 99233 SBSQ HOSP IP/OBS HIGH 50: CPT

## 2018-12-23 RX ADMIN — PANTOPRAZOLE SODIUM 40 MILLIGRAM(S): 20 TABLET, DELAYED RELEASE ORAL at 06:25

## 2018-12-23 RX ADMIN — Medication 1 DROP(S): at 22:18

## 2018-12-23 RX ADMIN — Medication 100 MILLIGRAM(S): at 12:03

## 2018-12-23 RX ADMIN — Medication 0.5 MILLIGRAM(S): at 06:20

## 2018-12-23 RX ADMIN — Medication 100 GRAM(S): at 00:21

## 2018-12-23 RX ADMIN — Medication 500000 UNIT(S): at 17:43

## 2018-12-23 RX ADMIN — Medication 1 MILLIGRAM(S): at 22:20

## 2018-12-23 RX ADMIN — Medication 2000 UNIT(S): at 12:03

## 2018-12-23 RX ADMIN — Medication 400 MILLIGRAM(S): at 09:17

## 2018-12-23 RX ADMIN — Medication 0.5 MILLIGRAM(S): at 17:43

## 2018-12-23 RX ADMIN — Medication 3 UNIT(S): at 09:15

## 2018-12-23 RX ADMIN — Medication 500000 UNIT(S): at 06:24

## 2018-12-23 RX ADMIN — Medication 1 DROP(S): at 06:21

## 2018-12-23 RX ADMIN — Medication 3 MILLILITER(S): at 09:17

## 2018-12-23 RX ADMIN — Medication 1 TABLET(S): at 12:03

## 2018-12-23 RX ADMIN — Medication 2: at 17:42

## 2018-12-23 RX ADMIN — Medication 500000 UNIT(S): at 12:04

## 2018-12-23 RX ADMIN — ONDANSETRON 4 MILLIGRAM(S): 8 TABLET, FILM COATED ORAL at 09:17

## 2018-12-23 RX ADMIN — Medication 3 UNIT(S): at 13:19

## 2018-12-23 RX ADMIN — Medication 2 UNIT(S): at 17:42

## 2018-12-23 RX ADMIN — Medication 500000 UNIT(S): at 22:29

## 2018-12-23 RX ADMIN — Medication 1 DROP(S): at 12:06

## 2018-12-23 RX ADMIN — Medication 81 MILLIGRAM(S): at 12:03

## 2018-12-23 RX ADMIN — Medication 3 MILLILITER(S): at 17:43

## 2018-12-23 RX ADMIN — Medication 30 MILLIGRAM(S): at 09:16

## 2018-12-23 RX ADMIN — Medication 3 MILLILITER(S): at 06:20

## 2018-12-23 RX ADMIN — Medication 3 MILLILITER(S): at 22:18

## 2018-12-23 RX ADMIN — SENNA PLUS 2 TABLET(S): 8.6 TABLET ORAL at 22:20

## 2018-12-23 RX ADMIN — ISOSORBIDE MONONITRATE 30 MILLIGRAM(S): 60 TABLET, EXTENDED RELEASE ORAL at 12:03

## 2018-12-23 RX ADMIN — Medication 3 MILLILITER(S): at 13:20

## 2018-12-23 RX ADMIN — Medication 20 MILLIGRAM(S): at 06:24

## 2018-12-23 RX ADMIN — ENOXAPARIN SODIUM 40 MILLIGRAM(S): 100 INJECTION SUBCUTANEOUS at 22:20

## 2018-12-23 RX ADMIN — INSULIN GLARGINE 4 UNIT(S): 100 INJECTION, SOLUTION SUBCUTANEOUS at 22:26

## 2018-12-23 RX ADMIN — MONTELUKAST 10 MILLIGRAM(S): 4 TABLET, CHEWABLE ORAL at 12:04

## 2018-12-23 NOTE — PROGRESS NOTE ADULT - PROBLEM SELECTOR PLAN 6
Dopplers negative for DVT.  Echo report noted, mild diastolic dysfunction, no significant changes from prior study in 2014.  Suspect leg edema may be related to recent high dose steroids.  C/w compression stockings Dopplers negative for DVT.  Echo report noted, mild diastolic dysfunction, no significant changes from prior study in 2014.  Suspect leg edema may be related to recent high dose steroids.  C/w IV lasix

## 2018-12-23 NOTE — PROGRESS NOTE ADULT - SUBJECTIVE AND OBJECTIVE BOX
Patient is a 58y old  Female who presents with a chief complaint of dyspnea (22 Dec 2018 14:04)      SUBJECTIVE / OVERNIGHT EVENTS: Still with dyspnea, no cp, had bm    MEDICATIONS  (STANDING):  ALBUTerol/ipratropium for Nebulization 3 milliLiter(s) Nebulizer <User Schedule>  artificial tears (preservative free) Ophthalmic Solution 1 Drop(s) Both EYES three times a day  aspirin enteric coated 81 milliGRAM(s) Oral daily  azithromycin   Tablet 250 milliGRAM(s) Oral <User Schedule>  Biotene Dry Mouth Oral Rinse 5 milliLiter(s) Swish and Spit two times a day  buDESOnide   0.5 milliGRAM(s) Respule 0.5 milliGRAM(s) Inhalation every 12 hours  cholecalciferol 2000 Unit(s) Oral daily  dextrose 5%. 1000 milliLiter(s) (50 mL/Hr) IV Continuous <Continuous>  dextrose 50% Injectable 12.5 Gram(s) IV Push once  dextrose 50% Injectable 25 Gram(s) IV Push once  dextrose 50% Injectable 25 Gram(s) IV Push once  docusate sodium 100 milliGRAM(s) Oral daily  enoxaparin Injectable 40 milliGRAM(s) SubCutaneous every 24 hours  furosemide   Injectable 20 milliGRAM(s) IV Push daily  insulin glargine Injectable (LANTUS) 4 Unit(s) SubCutaneous at bedtime  insulin lispro (HumaLOG) corrective regimen sliding scale   SubCutaneous three times a day before meals  insulin lispro (HumaLOG) corrective regimen sliding scale   SubCutaneous at bedtime  insulin lispro Injectable (HumaLOG) 2 Unit(s) SubCutaneous with dinner  insulin lispro Injectable (HumaLOG) 3 Unit(s) SubCutaneous before breakfast  insulin lispro Injectable (HumaLOG) 3 Unit(s) SubCutaneous before lunch  isosorbide   mononitrate ER Tablet (IMDUR) 30 milliGRAM(s) Oral daily  melatonin 1 milliGRAM(s) Oral at bedtime  montelukast 10 milliGRAM(s) Oral daily  multivitamin 1 Tablet(s) Oral daily  nystatin    Suspension 739401 Unit(s) Oral four times a day  pantoprazole    Tablet 40 milliGRAM(s) Oral before breakfast  polyethylene glycol 3350 17 Gram(s) Oral daily  predniSONE   Tablet 30 milliGRAM(s) Oral <User Schedule>  Roflumilast (Daliresp) 250 MICROGram(s) 250 MICROGram(s) Oral daily  senna 2 Tablet(s) Oral at bedtime  simethicone 80 milliGRAM(s) Chew once  theophylline ER Capsule 400 milliGRAM(s) Oral daily    MEDICATIONS  (PRN):  bisacodyl Suppository 10 milliGRAM(s) Rectal daily PRN Constipation  dextrose 40% Gel 15 Gram(s) Oral once PRN Blood Glucose LESS THAN 70 milliGRAM(s)/deciliter  glucagon  Injectable 1 milliGRAM(s) IntraMuscular once PRN Glucose LESS THAN 70 milligrams/deciliter  ondansetron Injectable 4 milliGRAM(s) IV Push every 8 hours PRN Nausea and/or Vomiting  sodium chloride 0.65% Nasal 1 Spray(s) Both Nostrils two times a day PRN Nasal Congestion        CAPILLARY BLOOD GLUCOSE      POCT Blood Glucose.: 100 mg/dL (23 Dec 2018 08:44)  POCT Blood Glucose.: 292 mg/dL (22 Dec 2018 21:24)  POCT Blood Glucose.: 236 mg/dL (22 Dec 2018 17:57)  POCT Blood Glucose.: 127 mg/dL (22 Dec 2018 12:50)    I&O's Summary    22 Dec 2018 07:01  -  23 Dec 2018 07:00  --------------------------------------------------------  IN: 790 mL / OUT: 1000 mL / NET: -210 mL        PHYSICAL EXAM:  GENERAL: NAD, well-developed  HEAD:  Atraumatic, Normocephalic  EYES: conjunctiva and sclera clear  NECK: No JVD  CHEST/LUNG: decreased breath sounds b/l; No wheeze  HEART: Regular rate and rhythm; S1S2  ABDOMEN: Soft, Nontender, Nondistended; Bowel sounds present  EXTREMITIES: +2 LE edema LE and UE bl  PSYCH: AAOx3  NEUROLOGY: non-focal      LABS:    12-23    133<L>  |  89<L>  |  31<H>  ----------------------------<  102<H>  4.0   |  37<H>  |  0.84    Ca    9.1      23 Dec 2018 06:55  Phos  3.3     12-22  Mg     2.2     12-23                RADIOLOGY & ADDITIONAL TESTS:    Imaging Personally Reviewed:    Consultant(s) Notes Reviewed:  pulm     Care Discussed with Consultants/Other Providers:

## 2018-12-23 NOTE — PROGRESS NOTE ADULT - PROBLEM SELECTOR PLAN 1
c/w Prednisone  30mg po qd per Pulmonary recs  Continue Pulmicort, Duoneb ATC   c/w Theophylline, Singulair.  Completed 7 days of biaxin   Home Trilogy vent arranged by pulmonary.  c/w intermittent bipap ; alternating with 5L NC  Pt reports more dyspnea since starting Roflumilast ; will discontinue  Pulm follow up with [facility]/transplant list  cw azithromycin   PT reccs JACOBO ; pt declining. Per Pulmonary pt is stable for discharge.  CM to start disposition planning. c/w Prednisone 30mg po qd   Continue Pulmicort, Duoneb ATC   c/w Theophylline, Singulair.  Completed 7 days of biaxin   Home Trilogy vent arranged by pulmonary.  c/w intermittent bipap ; alternating with 5L NC  C/w daliresp  Pulm follow up with [facility]/transplant list  cw azithromycin   PT recs for eventual J[facility]OBO

## 2018-12-23 NOTE — PROGRESS NOTE ADULT - PROBLEM SELECTOR PLAN 8
Echo with  mild diastolic dysfunction, no significant changes from prior study in 2014.  c/w IV Lasix 20mg IVP qd per Cardiology recs Echo with  mild diastolic dysfunction, no significant changes from prior study in 2014.  c/w IV Lasix 20mg IVP

## 2018-12-23 NOTE — PROGRESS NOTE ADULT - PROBLEM SELECTOR PLAN 3
A1C 7.2, but with recent hypoglycemic episodes ; Lantus dose decreased ; AM FS now improving  -c/w Lantus 4 units QHS  -Humalog decreased to 3-3-2 units  w/meals (hold if not eating)  - Humalog moderate correction scale AC and Mod HS scale  - Continue to monitor blood sugars.  - Endocrine following

## 2018-12-23 NOTE — PROGRESS NOTE ADULT - SUBJECTIVE AND OBJECTIVE BOX
Afebrile. O2 sat 97% on 4 liters nasal O2.  OOB ---> chair this afternoon.  Dyspneic on very mild exertion, and sometimes at rest.  Wearing BiPAP at nite (and PRN during daytime) which she says "helps my breathing".  Infrequent cough and sputum production.   No wheezing. No chest pain.  Still complaining of leg weakness. Less fatigued today.    Vital Signs Last 24 Hrs  T(C): 36.3 (23 Dec 2018 06:14), Max: 36.5 (23 Dec 2018 00:19)  T(F): 97.3 (23 Dec 2018 06:14), Max: 97.7 (23 Dec 2018 00:19)  HR: 82 (23 Dec 2018 09:11) (79 - 88)  BP: 131/80 (23 Dec 2018 06:14) (125/79 - 131/80)  BP(mean): --  RR: 16 (23 Dec 2018 06:14) (16 - 20)  SpO2: 97% (23 Dec 2018 09:11) (97% - 100%)    Medications:  MEDICATIONS  (STANDING):  ALBUTerol/ipratropium for Nebulization 3 milliLiter(s) Nebulizer <User Schedule>  artificial tears (preservative free) Ophthalmic Solution 1 Drop(s) Both EYES three times a day  aspirin enteric coated 81 milliGRAM(s) Oral daily  azithromycin   Tablet 250 milliGRAM(s) Oral <User Schedule>  Biotene Dry Mouth Oral Rinse 5 milliLiter(s) Swish and Spit two times a day  buDESOnide   0.5 milliGRAM(s) Respule 0.5 milliGRAM(s) Inhalation every 12 hours  cholecalciferol 2000 Unit(s) Oral daily  dextrose 5%. 1000 milliLiter(s) (50 mL/Hr) IV Continuous <Continuous>  dextrose 50% Injectable 12.5 Gram(s) IV Push once  dextrose 50% Injectable 25 Gram(s) IV Push once  dextrose 50% Injectable 25 Gram(s) IV Push once  docusate sodium 100 milliGRAM(s) Oral daily  enoxaparin Injectable 40 milliGRAM(s) SubCutaneous every 24 hours  furosemide   Injectable 20 milliGRAM(s) IV Push daily  insulin glargine Injectable (LANTUS) 4 Unit(s) SubCutaneous at bedtime  insulin lispro (HumaLOG) corrective regimen sliding scale   SubCutaneous three times a day before meals  insulin lispro (HumaLOG) corrective regimen sliding scale   SubCutaneous at bedtime  insulin lispro Injectable (HumaLOG) 2 Unit(s) SubCutaneous with dinner  insulin lispro Injectable (HumaLOG) 3 Unit(s) SubCutaneous before breakfast  insulin lispro Injectable (HumaLOG) 3 Unit(s) SubCutaneous before lunch  isosorbide   mononitrate ER Tablet (IMDUR) 30 milliGRAM(s) Oral daily  melatonin 1 milliGRAM(s) Oral at bedtime  montelukast 10 milliGRAM(s) Oral daily  multivitamin 1 Tablet(s) Oral daily  nystatin    Suspension 379845 Unit(s) Oral four times a day  pantoprazole    Tablet 40 milliGRAM(s) Oral before breakfast  polyethylene glycol 3350 17 Gram(s) Oral daily  predniSONE   Tablet 30 milliGRAM(s) Oral <User Schedule>  Roflumilast (Daliresp) 250 MICROGram(s) 250 MICROGram(s) Oral daily  senna 2 Tablet(s) Oral at bedtime  simethicone 80 milliGRAM(s) Chew once  theophylline ER Capsule 400 milliGRAM(s) Oral daily    MEDICATIONS  (PRN):  bisacodyl Suppository 10 milliGRAM(s) Rectal daily PRN Constipation  dextrose 40% Gel 15 Gram(s) Oral once PRN Blood Glucose LESS THAN 70 milliGRAM(s)/deciliter  glucagon  Injectable 1 milliGRAM(s) IntraMuscular once PRN Glucose LESS THAN 70 milligrams/deciliter  ondansetron Injectable 4 milliGRAM(s) IV Push every 8 hours PRN Nausea and/or Vomiting  sodium chloride 0.65% Nasal 1 Spray(s) Both Nostrils two times a day PRN Nasal Congestion      Allergies    No Known Allergies    Intolerances        Physical Examination:  Pleasant. Slightly dyspneic and tachypneic with long conversations on 4 liters nasal O2.  Neck: no JVD, LAD, accessory muscle use  PULM: Distant breath sounds bilaterally. Prolonged expiratory phase. No wheezes, rhonchi, or rales.  CVS: RR  Abdomen: nontender  Extremities: + LE edema    LAB:  Na+ 133  CO2 37  creatinine 0.84  Theophylline level 3.1    Plan:  1. Continue Duoneb/Budesonide nebulizer tx.  2. Continue Singulair, Daliresp, and Theophylline.  3. On Prednisone 30mg PO daily.  4. Continue nocturnal BiPAP (12/5 with 40% FiO2). She can also wear during daytime hours if necessary.  5. Titrate nasal O2 during daytime to keep O2 sat ~ 95%.  6. On Protonix and SQ Lovenox.   7. OOB ---> chair as tolerated.  8. Diuresis as per Cardiology. Continue to monitor electrolytes and renal function.  9. Repeat Theophylline level tomorrow.  10.On Azithromycin for anti-inflammatory effects.  11.PTx as able.  12.? Transfer to [facility] Transplant team.    POC: David Caban M.D.  [phone]

## 2018-12-24 LAB
GLUCOSE BLDC GLUCOMTR-MCNC: 143 MG/DL — HIGH (ref 70–99)
GLUCOSE BLDC GLUCOMTR-MCNC: 266 MG/DL — HIGH (ref 70–99)
GLUCOSE BLDC GLUCOMTR-MCNC: 266 MG/DL — HIGH (ref 70–99)
GLUCOSE BLDC GLUCOMTR-MCNC: 91 MG/DL — SIGNIFICANT CHANGE UP (ref 70–99)

## 2018-12-24 PROCEDURE — 99233 SBSQ HOSP IP/OBS HIGH 50: CPT

## 2018-12-24 PROCEDURE — 99232 SBSQ HOSP IP/OBS MODERATE 35: CPT

## 2018-12-24 RX ADMIN — Medication 3 MILLILITER(S): at 09:43

## 2018-12-24 RX ADMIN — Medication 3 MILLILITER(S): at 17:22

## 2018-12-24 RX ADMIN — Medication 100 MILLIGRAM(S): at 12:49

## 2018-12-24 RX ADMIN — Medication 3 MILLILITER(S): at 05:11

## 2018-12-24 RX ADMIN — Medication 3 UNIT(S): at 13:30

## 2018-12-24 RX ADMIN — Medication 1 DROP(S): at 22:28

## 2018-12-24 RX ADMIN — Medication 1 DROP(S): at 13:30

## 2018-12-24 RX ADMIN — Medication 1 TABLET(S): at 12:49

## 2018-12-24 RX ADMIN — Medication 500000 UNIT(S): at 05:12

## 2018-12-24 RX ADMIN — Medication 3 MILLILITER(S): at 22:29

## 2018-12-24 RX ADMIN — Medication 20 MILLIGRAM(S): at 05:24

## 2018-12-24 RX ADMIN — ONDANSETRON 4 MILLIGRAM(S): 8 TABLET, FILM COATED ORAL at 12:49

## 2018-12-24 RX ADMIN — Medication 2000 UNIT(S): at 12:48

## 2018-12-24 RX ADMIN — Medication 1 DROP(S): at 05:11

## 2018-12-24 RX ADMIN — Medication 3: at 17:26

## 2018-12-24 RX ADMIN — PANTOPRAZOLE SODIUM 40 MILLIGRAM(S): 20 TABLET, DELAYED RELEASE ORAL at 05:24

## 2018-12-24 RX ADMIN — MONTELUKAST 10 MILLIGRAM(S): 4 TABLET, CHEWABLE ORAL at 12:48

## 2018-12-24 RX ADMIN — Medication 3 MILLILITER(S): at 13:31

## 2018-12-24 RX ADMIN — Medication 2 UNIT(S): at 17:26

## 2018-12-24 RX ADMIN — Medication 1: at 22:29

## 2018-12-24 RX ADMIN — Medication 0.5 MILLIGRAM(S): at 17:23

## 2018-12-24 RX ADMIN — Medication 400 MILLIGRAM(S): at 09:41

## 2018-12-24 RX ADMIN — INSULIN GLARGINE 4 UNIT(S): 100 INJECTION, SOLUTION SUBCUTANEOUS at 22:28

## 2018-12-24 RX ADMIN — Medication 81 MILLIGRAM(S): at 12:49

## 2018-12-24 RX ADMIN — SENNA PLUS 2 TABLET(S): 8.6 TABLET ORAL at 22:27

## 2018-12-24 RX ADMIN — Medication 30 MILLIGRAM(S): at 09:42

## 2018-12-24 RX ADMIN — Medication 3 UNIT(S): at 09:43

## 2018-12-24 RX ADMIN — ISOSORBIDE MONONITRATE 30 MILLIGRAM(S): 60 TABLET, EXTENDED RELEASE ORAL at 12:49

## 2018-12-24 RX ADMIN — ENOXAPARIN SODIUM 40 MILLIGRAM(S): 100 INJECTION SUBCUTANEOUS at 22:27

## 2018-12-24 RX ADMIN — Medication 0.5 MILLIGRAM(S): at 05:11

## 2018-12-24 NOTE — PROGRESS NOTE ADULT - SUBJECTIVE AND OBJECTIVE BOX
CARDIOLOGY FOLLOW UP - Dr. Brunson    CC no cp or sob       PHYSICAL EXAM:  T(C): 36.4 (12-24-18 @ 04:56), Max: 36.7 (12-23-18 @ 15:07)  HR: 81 (12-24-18 @ 05:31) (76 - 105)  BP: 140/85 (12-24-18 @ 04:56) (140/85 - 150/86)  RR: 19 (12-24-18 @ 04:56) (19 - 20)  SpO2: 100% (12-24-18 @ 05:31) (99% - 100%)  Wt(kg): --  I&O's Summary    23 Dec 2018 07:01  -  24 Dec 2018 07:00  --------------------------------------------------------  IN: 960 mL / OUT: 1100 mL / NET: -140 mL    24 Dec 2018 07:01  -  24 Dec 2018 11:42  --------------------------------------------------------  IN: 240 mL / OUT: 0 mL / NET: 240 mL        Appearance: Normal	  Cardiovascular: Normal S1 S2,RRR, No JVD, No murmurs  Respiratory: Diminished   Gastrointestinal:  Soft, Non-tender, + BS	  Extremities: Normal range of motion, No clubbing, cyanosis or edema        MEDICATIONS  (STANDING):  ALBUTerol/ipratropium for Nebulization 3 milliLiter(s) Nebulizer <User Schedule>  artificial tears (preservative free) Ophthalmic Solution 1 Drop(s) Both EYES three times a day  aspirin enteric coated 81 milliGRAM(s) Oral daily  azithromycin   Tablet 250 milliGRAM(s) Oral <User Schedule>  Biotene Dry Mouth Oral Rinse 5 milliLiter(s) Swish and Spit two times a day  buDESOnide   0.5 milliGRAM(s) Respule 0.5 milliGRAM(s) Inhalation every 12 hours  cholecalciferol 2000 Unit(s) Oral daily  dextrose 5%. 1000 milliLiter(s) (50 mL/Hr) IV Continuous <Continuous>  dextrose 50% Injectable 12.5 Gram(s) IV Push once  dextrose 50% Injectable 25 Gram(s) IV Push once  dextrose 50% Injectable 25 Gram(s) IV Push once  docusate sodium 100 milliGRAM(s) Oral daily  enoxaparin Injectable 40 milliGRAM(s) SubCutaneous every 24 hours  furosemide   Injectable 20 milliGRAM(s) IV Push daily  insulin glargine Injectable (LANTUS) 4 Unit(s) SubCutaneous at bedtime  insulin lispro (HumaLOG) corrective regimen sliding scale   SubCutaneous three times a day before meals  insulin lispro (HumaLOG) corrective regimen sliding scale   SubCutaneous at bedtime  insulin lispro Injectable (HumaLOG) 2 Unit(s) SubCutaneous with dinner  insulin lispro Injectable (HumaLOG) 3 Unit(s) SubCutaneous before breakfast  insulin lispro Injectable (HumaLOG) 3 Unit(s) SubCutaneous before lunch  isosorbide   mononitrate ER Tablet (IMDUR) 30 milliGRAM(s) Oral daily  melatonin 1 milliGRAM(s) Oral at bedtime  montelukast 10 milliGRAM(s) Oral daily  multivitamin 1 Tablet(s) Oral daily  nystatin    Suspension 840661 Unit(s) Oral four times a day  pantoprazole    Tablet 40 milliGRAM(s) Oral before breakfast  polyethylene glycol 3350 17 Gram(s) Oral daily  predniSONE   Tablet 30 milliGRAM(s) Oral <User Schedule>  Roflumilast (Daliresp) 250 MICROGram(s) 250 MICROGram(s) Oral daily  senna 2 Tablet(s) Oral at bedtime  simethicone 80 milliGRAM(s) Chew once  theophylline ER Capsule 400 milliGRAM(s) Oral daily      TELEMETRY: 	    ECG:  	  RADIOLOGY:   DIAGNOSTIC TESTING:  [ ] Echocardiogram:  [ ]  Catheterization:  [ ] Stress Test:    OTHER: 	    LABS:	 	            12-23    133<L>  |  89<L>  |  31<H>  ----------------------------<  102<H>  4.0   |  37<H>  |  0.84    Ca    9.1      23 Dec 2018 06:55  Phos  3.3     12-22  Mg     2.2     12-23

## 2018-12-24 NOTE — PROGRESS NOTE ADULT - PROBLEM SELECTOR PLAN 6
Dopplers negative for DVT.  Echo report noted, mild diastolic dysfunction, no significant changes from prior study in 2014.  Suspect leg edema may be related to recent high dose steroids.  C/w IV lasix

## 2018-12-24 NOTE — PROGRESS NOTE ADULT - SUBJECTIVE AND OBJECTIVE BOX
Contact info:   Dony Metz MD  pager [phone], please provide 10 digit call back number.   Please note that this patient may be followed by another provider tomorrow.   If no answer or after hours, please contact [phone]    Subjective: patient is eating her lunch.  Her food intake is still erratic.  She's snacking throughout the day sometimes. Nurse at bedside, states that she will only administer insulin if patient eats more than 50% of her meal.      MEDICATIONS  (STANDING):  ALBUTerol/ipratropium for Nebulization 3 milliLiter(s) Nebulizer <User Schedule>  artificial tears (preservative free) Ophthalmic Solution 1 Drop(s) Both EYES three times a day  aspirin enteric coated 81 milliGRAM(s) Oral daily  azithromycin   Tablet 250 milliGRAM(s) Oral <User Schedule>  Biotene Dry Mouth Oral Rinse 5 milliLiter(s) Swish and Spit two times a day  buDESOnide   0.5 milliGRAM(s) Respule 0.5 milliGRAM(s) Inhalation every 12 hours  cholecalciferol 2000 Unit(s) Oral daily  docusate sodium 100 milliGRAM(s) Oral daily  enoxaparin Injectable 40 milliGRAM(s) SubCutaneous every 24 hours  furosemide   Injectable 20 milliGRAM(s) IV Push daily  insulin glargine Injectable (LANTUS) 4 Unit(s) SubCutaneous at bedtime  insulin lispro (HumaLOG) corrective regimen sliding scale   SubCutaneous three times a day before meals  insulin lispro (HumaLOG) corrective regimen sliding scale   SubCutaneous at bedtime  insulin lispro Injectable (HumaLOG) 2 Unit(s) SubCutaneous with dinner  insulin lispro Injectable (HumaLOG) 3 Unit(s) SubCutaneous before breakfast  insulin lispro Injectable (HumaLOG) 3 Unit(s) SubCutaneous before lunch  isosorbide   mononitrate ER Tablet (IMDUR) 30 milliGRAM(s) Oral daily  melatonin 1 milliGRAM(s) Oral at bedtime  montelukast 10 milliGRAM(s) Oral daily  multivitamin 1 Tablet(s) Oral daily  nystatin    Suspension 725340 Unit(s) Oral four times a day  pantoprazole    Tablet 40 milliGRAM(s) Oral before breakfast  polyethylene glycol 3350 17 Gram(s) Oral daily  predniSONE   Tablet 30 milliGRAM(s) Oral <User Schedule>  Roflumilast (Daliresp) 250 MICROGram(s) 250 MICROGram(s) Oral daily  senna 2 Tablet(s) Oral at bedtime  simethicone 80 milliGRAM(s) Chew once  theophylline ER Capsule 400 milliGRAM(s) Oral daily    MEDICATIONS  (PRN):  bisacodyl Suppository 10 milliGRAM(s) Rectal daily PRN Constipation  dextrose 40% Gel 15 Gram(s) Oral once PRN Blood Glucose LESS THAN 70 milliGRAM(s)/deciliter  glucagon  Injectable 1 milliGRAM(s) IntraMuscular once PRN Glucose LESS THAN 70 milligrams/deciliter  ondansetron Injectable 4 milliGRAM(s) IV Push every 8 hours PRN Nausea and/or Vomiting  sodium chloride 0.65% Nasal 1 Spray(s) Both Nostrils two times a day PRN Nasal Congestion      Allergies    No Known Allergies    Review of Systems:  General: "When is my swelling going to improve?"  GI: Tolerating POs without any N/V/D/ABD PAIN.  CV: No CP/SOB  ENDO: No S&Sx of hypoglycemia    PHYSICAL EXAM:  VITALS: T(C): 36.8 (12-24-18 @ 12:47)  T(F): 98.2 (12-24-18 @ 12:47), Max: 98.2 (12-24-18 @ 12:47)  HR: 87 (12-24-18 @ 12:47) (76 - 105)  BP: 142/85 (12-24-18 @ 12:47) (140/85 - 150/86)  RR:  (19 - 20)  SpO2:  (99% - 100%)  Wt(kg): --  GENERAL: NAD, well-groomed, well-developed  RESPIRATORY: Clear to auscultation bilaterally; No rales, rhonchi, wheezing, or rubs, just received treatment.   CARDIOVASCULAR: Regular rate and rhythm; No murmurs; no peripheral edema  GI: Soft, nontender, non distended, normal bowel sounds  SKIN: Dry, intact, No rashes or lesions  PSYCH: Alert and oriented x 3, normal affect, normal mood    POCT Blood Glucose.: 143 mg/dL (12-24-18 @ 13:29)  POCT Blood Glucose.: 91 mg/dL (12-24-18 @ 09:38)  POCT Blood Glucose.: 227 mg/dL (12-23-18 @ 22:07)  POCT Blood Glucose.: 212 mg/dL (12-23-18 @ 17:07)  POCT Blood Glucose.: 133 mg/dL (12-23-18 @ 12:51)  POCT Blood Glucose.: 100 mg/dL (12-23-18 @ 08:44)  POCT Blood Glucose.: 292 mg/dL (12-22-18 @ 21:24)  POCT Blood Glucose.: 236 mg/dL (12-22-18 @ 17:57)  POCT Blood Glucose.: 127 mg/dL (12-22-18 @ 12:50)  POCT Blood Glucose.: 141 mg/dL (12-22-18 @ 08:52)  POCT Blood Glucose.: 214 mg/dL (12-21-18 @ 22:11)  POCT Blood Glucose.: 260 mg/dL (12-21-18 @ 17:22)      12-23    133<L>  |  89<L>  |  31<H>  ----------------------------<  102<H>  4.0   |  37<H>  |  0.84    EGFR if : 88  EGFR if non : 76    Ca    9.1      12-23  Mg     2.2     12-23  Phos  3.3     12-22          Thyroid Function Tests:      Hemoglobin A1C, Whole Blood: 7.2 % <H> [4.0 - 5.6] (11-30-18 @ 07:58)

## 2018-12-24 NOTE — PROGRESS NOTE ADULT - ASSESSMENT
57 y/o F w/h/o controlled T2DM on Metformin 500mg bid. Also h/o CAD and COPD. Pt now on Prednisone 30mg daily w/tightly controlled BG values today. Appetite is still some what poor, because of that, will continue with her current does of insulin regimen and not make changes.

## 2018-12-24 NOTE — PROGRESS NOTE ADULT - PROBLEM SELECTOR PLAN 3
A1C 7.2  -c/w Lantus 4 units QHS  -Humalog decreased to 3-3-2 units  w/meals   - Continue to monitor blood sugars.  - Endocrine following

## 2018-12-24 NOTE — PROGRESS NOTE ADULT - ASSESSMENT
58 F PMH COPD on home O2 3L, HTN, HLD, CAD s/p x6 stents, T2DM, pHTN, PVD, p/w progressive worsening dyspnea x 2 weeks now complicated by chronic progressive hypoxic respiratory failure.     1. Chronic progressive hypoxic respiratory failure  multifactorial in the setting of worsening COPD, CAD, chronic diastolic CHF, pulmonary HTN   volume status improving , continue lasix 20mg IVP daily   echo with normal LV function, mild diastolic dysfunction, inadequate tricuspid regurg   cv stable no chest pain or sob   bipap PRN  continue dueonebs, inhaled steroids, singulair/theophylline/daliresp, pulm f/u   EKG reviewed, corrected QT WNL (aprox 462), on azithromycin    2. CAD s/p PCI  cv stable  continue asa, imdur    3. COPD   remains on bipap  continue Duoneb inhaled steroids, singulair/theophylline/daliresp, pulm f/u     4. hyponatremia/hyperkalemia  trend bmp  renal f/u     dvt ppx

## 2018-12-24 NOTE — PROGRESS NOTE ADULT - SUBJECTIVE AND OBJECTIVE BOX
Patient is a 58y old  Female who presents with a chief complaint of dyspnea (24 Dec 2018 11:42)      SUBJECTIVE / OVERNIGHT EVENTS: still with marked dyspnea, feels cold today, persistent swelling in extremities, had bm, no dysuria,   MEDICATIONS  (STANDING):  ALBUTerol/ipratropium for Nebulization 3 milliLiter(s) Nebulizer <User Schedule>  artificial tears (preservative free) Ophthalmic Solution 1 Drop(s) Both EYES three times a day  aspirin enteric coated 81 milliGRAM(s) Oral daily  azithromycin   Tablet 250 milliGRAM(s) Oral <User Schedule>  Biotene Dry Mouth Oral Rinse 5 milliLiter(s) Swish and Spit two times a day  buDESOnide   0.5 milliGRAM(s) Respule 0.5 milliGRAM(s) Inhalation every 12 hours  cholecalciferol 2000 Unit(s) Oral daily  dextrose 5%. 1000 milliLiter(s) (50 mL/Hr) IV Continuous <Continuous>  dextrose 50% Injectable 12.5 Gram(s) IV Push once  dextrose 50% Injectable 25 Gram(s) IV Push once  dextrose 50% Injectable 25 Gram(s) IV Push once  docusate sodium 100 milliGRAM(s) Oral daily  enoxaparin Injectable 40 milliGRAM(s) SubCutaneous every 24 hours  furosemide   Injectable 20 milliGRAM(s) IV Push daily  insulin glargine Injectable (LANTUS) 4 Unit(s) SubCutaneous at bedtime  insulin lispro (HumaLOG) corrective regimen sliding scale   SubCutaneous three times a day before meals  insulin lispro (HumaLOG) corrective regimen sliding scale   SubCutaneous at bedtime  insulin lispro Injectable (HumaLOG) 2 Unit(s) SubCutaneous with dinner  insulin lispro Injectable (HumaLOG) 3 Unit(s) SubCutaneous before breakfast  insulin lispro Injectable (HumaLOG) 3 Unit(s) SubCutaneous before lunch  isosorbide   mononitrate ER Tablet (IMDUR) 30 milliGRAM(s) Oral daily  melatonin 1 milliGRAM(s) Oral at bedtime  montelukast 10 milliGRAM(s) Oral daily  multivitamin 1 Tablet(s) Oral daily  nystatin    Suspension 331493 Unit(s) Oral four times a day  pantoprazole    Tablet 40 milliGRAM(s) Oral before breakfast  polyethylene glycol 3350 17 Gram(s) Oral daily  predniSONE   Tablet 30 milliGRAM(s) Oral <User Schedule>  Roflumilast (Daliresp) 250 MICROGram(s) 250 MICROGram(s) Oral daily  senna 2 Tablet(s) Oral at bedtime  simethicone 80 milliGRAM(s) Chew once  theophylline ER Capsule 400 milliGRAM(s) Oral daily    MEDICATIONS  (PRN):  bisacodyl Suppository 10 milliGRAM(s) Rectal daily PRN Constipation  dextrose 40% Gel 15 Gram(s) Oral once PRN Blood Glucose LESS THAN 70 milliGRAM(s)/deciliter  glucagon  Injectable 1 milliGRAM(s) IntraMuscular once PRN Glucose LESS THAN 70 milligrams/deciliter  ondansetron Injectable 4 milliGRAM(s) IV Push every 8 hours PRN Nausea and/or Vomiting  sodium chloride 0.65% Nasal 1 Spray(s) Both Nostrils two times a day PRN Nasal Congestion        CAPILLARY BLOOD GLUCOSE      POCT Blood Glucose.: 143 mg/dL (24 Dec 2018 13:29)  POCT Blood Glucose.: 91 mg/dL (24 Dec 2018 09:38)  POCT Blood Glucose.: 227 mg/dL (23 Dec 2018 22:07)  POCT Blood Glucose.: 212 mg/dL (23 Dec 2018 17:07)    I&O's Summary    23 Dec 2018 07:01  -  24 Dec 2018 07:00  --------------------------------------------------------  IN: 960 mL / OUT: 1100 mL / NET: -140 mL    24 Dec 2018 07:01  -  24 Dec 2018 14:00  --------------------------------------------------------  IN: 240 mL / OUT: 0 mL / NET: 240 mL        PHYSICAL EXAM:  GENERAL: NAD, well-developed  HEAD:  Atraumatic, Normocephalic  EYES: conjunctiva and sclera clear  NECK:  No JVD  CHEST/LUNG: decreased breath sounds b/l ; No wheeze, no crackles   HEART: Regular rate and rhythm; S1S2  ABDOMEN: Soft, Nontender, Nondistended; Bowel sounds present  EXTREMITIES: +2 LE edema, compression stockings, +1 UE edema bl  PSYCH: AAOx3  NEUROLOGY: non-focal      LABS:    12-23    133<L>  |  89<L>  |  31<H>  ----------------------------<  102<H>  4.0   |  37<H>  |  0.84    Ca    9.1      23 Dec 2018 06:55  Phos  3.3     12-22  Mg     2.2     12-23                RADIOLOGY & ADDITIONAL TESTS:    Imaging Personally Reviewed:    Consultant(s) Notes Reviewed:      Care Discussed with Consultants/Other Providers:

## 2018-12-24 NOTE — PROGRESS NOTE ADULT - PROBLEM SELECTOR PLAN 5
resolved  monitor bmp daily  Hyponatremia also improving  Nephrology is following  c/w Low K diet  pending repeat labs

## 2018-12-24 NOTE — PROGRESS NOTE ADULT - PROBLEM SELECTOR PLAN 1
-test BG AC/HS  -C/w Lantus 4 units QHS  -Continue with Humalog 3-3-2 w/meals (hold if not eating)  -c/w Humalog low correction scale AC and Low HS scale  -Please notify endocrine when steroids further tapered so we can adjust the does of insulin.     Discharge planning: Can restart home regimen metformin when she's off of steroid treatments.

## 2018-12-24 NOTE — PROGRESS NOTE ADULT - PROBLEM SELECTOR PLAN 8
Echo with  mild diastolic dysfunction, no significant changes from prior study in 2014.  c/w IV Lasix 20mg IVP

## 2018-12-24 NOTE — PROGRESS NOTE ADULT - PROBLEM SELECTOR PLAN 1
c/w Prednisone 30mg po qd   Continue Pulmicort, Duoneb ATC   c/w Theophylline, Singulair.  Completed 7 days of biaxin   Home Trilogy vent arranged by pulmonary.  c/w intermittent bipap ; alternating with 5L NC  C/w daliresp  Pulm follow up with [facility]/transplant list  cw azithromycin   PT recs for eventual JACOBO

## 2018-12-25 LAB
ANION GAP SERPL CALC-SCNC: 8 MMOL/L — SIGNIFICANT CHANGE UP (ref 5–17)
BUN SERPL-MCNC: 36 MG/DL — HIGH (ref 7–23)
CALCIUM SERPL-MCNC: 9.3 MG/DL — SIGNIFICANT CHANGE UP (ref 8.4–10.5)
CHLORIDE SERPL-SCNC: 89 MMOL/L — LOW (ref 96–108)
CO2 SERPL-SCNC: 39 MMOL/L — HIGH (ref 22–31)
CREAT SERPL-MCNC: 0.99 MG/DL — SIGNIFICANT CHANGE UP (ref 0.5–1.3)
GLUCOSE BLDC GLUCOMTR-MCNC: 155 MG/DL — HIGH (ref 70–99)
GLUCOSE BLDC GLUCOMTR-MCNC: 180 MG/DL — HIGH (ref 70–99)
GLUCOSE BLDC GLUCOMTR-MCNC: 191 MG/DL — HIGH (ref 70–99)
GLUCOSE BLDC GLUCOMTR-MCNC: 215 MG/DL — HIGH (ref 70–99)
GLUCOSE BLDC GLUCOMTR-MCNC: 246 MG/DL — HIGH (ref 70–99)
GLUCOSE BLDC GLUCOMTR-MCNC: 276 MG/DL — HIGH (ref 70–99)
GLUCOSE BLDC GLUCOMTR-MCNC: 366 MG/DL — HIGH (ref 70–99)
GLUCOSE SERPL-MCNC: 165 MG/DL — HIGH (ref 70–99)
HCT VFR BLD CALC: 31.2 % — LOW (ref 34.5–45)
HGB BLD-MCNC: 10 G/DL — LOW (ref 11.5–15.5)
MAGNESIUM SERPL-MCNC: 1.8 MG/DL — SIGNIFICANT CHANGE UP (ref 1.6–2.6)
MCHC RBC-ENTMCNC: 28.4 PG — SIGNIFICANT CHANGE UP (ref 27–34)
MCHC RBC-ENTMCNC: 32.1 GM/DL — SIGNIFICANT CHANGE UP (ref 32–36)
MCV RBC AUTO: 88.6 FL — SIGNIFICANT CHANGE UP (ref 80–100)
PLATELET # BLD AUTO: 113 K/UL — LOW (ref 150–400)
POTASSIUM SERPL-MCNC: 4.1 MMOL/L — SIGNIFICANT CHANGE UP (ref 3.5–5.3)
POTASSIUM SERPL-SCNC: 4.1 MMOL/L — SIGNIFICANT CHANGE UP (ref 3.5–5.3)
RBC # BLD: 3.52 M/UL — LOW (ref 3.8–5.2)
RBC # FLD: 13.7 % — SIGNIFICANT CHANGE UP (ref 10.3–14.5)
SODIUM SERPL-SCNC: 136 MMOL/L — SIGNIFICANT CHANGE UP (ref 135–145)
WBC # BLD: 12.96 K/UL — HIGH (ref 3.8–10.5)
WBC # FLD AUTO: 12.96 K/UL — HIGH (ref 3.8–10.5)

## 2018-12-25 PROCEDURE — 71045 X-RAY EXAM CHEST 1 VIEW: CPT | Mod: 26

## 2018-12-25 PROCEDURE — 99233 SBSQ HOSP IP/OBS HIGH 50: CPT

## 2018-12-25 RX ADMIN — ONDANSETRON 4 MILLIGRAM(S): 8 TABLET, FILM COATED ORAL at 23:05

## 2018-12-25 RX ADMIN — Medication 1 DROP(S): at 06:08

## 2018-12-25 RX ADMIN — Medication 500000 UNIT(S): at 23:06

## 2018-12-25 RX ADMIN — ENOXAPARIN SODIUM 40 MILLIGRAM(S): 100 INJECTION SUBCUTANEOUS at 23:06

## 2018-12-25 RX ADMIN — Medication 3 MILLILITER(S): at 10:00

## 2018-12-25 RX ADMIN — Medication 1: at 11:03

## 2018-12-25 RX ADMIN — Medication 2 UNIT(S): at 18:45

## 2018-12-25 RX ADMIN — Medication 30 MILLIGRAM(S): at 07:38

## 2018-12-25 RX ADMIN — Medication 3 MILLILITER(S): at 06:08

## 2018-12-25 RX ADMIN — Medication 500000 UNIT(S): at 18:47

## 2018-12-25 RX ADMIN — PANTOPRAZOLE SODIUM 40 MILLIGRAM(S): 20 TABLET, DELAYED RELEASE ORAL at 06:10

## 2018-12-25 RX ADMIN — Medication 20 MILLIGRAM(S): at 06:10

## 2018-12-25 RX ADMIN — Medication 3 MILLILITER(S): at 23:06

## 2018-12-25 RX ADMIN — Medication 2000 UNIT(S): at 11:03

## 2018-12-25 RX ADMIN — INSULIN GLARGINE 4 UNIT(S): 100 INJECTION, SOLUTION SUBCUTANEOUS at 23:32

## 2018-12-25 RX ADMIN — Medication 500000 UNIT(S): at 06:08

## 2018-12-25 RX ADMIN — Medication 3 MILLILITER(S): at 13:20

## 2018-12-25 RX ADMIN — AZITHROMYCIN 250 MILLIGRAM(S): 500 TABLET, FILM COATED ORAL at 11:04

## 2018-12-25 RX ADMIN — Medication 2: at 18:46

## 2018-12-25 RX ADMIN — Medication 1 TABLET(S): at 11:03

## 2018-12-25 RX ADMIN — MONTELUKAST 10 MILLIGRAM(S): 4 TABLET, CHEWABLE ORAL at 11:05

## 2018-12-25 RX ADMIN — Medication 400 MILLIGRAM(S): at 07:39

## 2018-12-25 RX ADMIN — Medication 1 DROP(S): at 13:22

## 2018-12-25 RX ADMIN — Medication 81 MILLIGRAM(S): at 11:03

## 2018-12-25 RX ADMIN — Medication 0.5 MILLIGRAM(S): at 18:21

## 2018-12-25 RX ADMIN — Medication 100 MILLIGRAM(S): at 11:03

## 2018-12-25 RX ADMIN — ISOSORBIDE MONONITRATE 30 MILLIGRAM(S): 60 TABLET, EXTENDED RELEASE ORAL at 11:03

## 2018-12-25 RX ADMIN — Medication 3 UNIT(S): at 11:03

## 2018-12-25 RX ADMIN — Medication 0.5 MILLIGRAM(S): at 06:08

## 2018-12-25 RX ADMIN — Medication 1 DROP(S): at 23:05

## 2018-12-25 RX ADMIN — Medication 3 MILLILITER(S): at 18:21

## 2018-12-25 RX ADMIN — Medication 5: at 13:22

## 2018-12-25 NOTE — PROGRESS NOTE ADULT - SUBJECTIVE AND OBJECTIVE BOX
Afebrile. O2 sat 100% on 4 liters nasal O2.  OOB ---> chair.  Dyspneic on very mild exertion.  Wearing BiPAP at nite (and PRN during daytime).  No significant cough and sputum production.   No wheezing. No chest pain.  Leg weakness slightly improved.    Vital Signs Last 24 Hrs  T(C): 36.3 (25 Dec 2018 06:04), Max: 36.8 (24 Dec 2018 12:47)  T(F): 97.3 (25 Dec 2018 06:04), Max: 98.2 (24 Dec 2018 12:47)  HR: 97 (25 Dec 2018 06:04) (87 - 100)  BP: 132/79 (25 Dec 2018 06:04) (132/79 - 142/85)  BP(mean): --  RR: 18 (25 Dec 2018 06:04) (18 - 20)  SpO2: 99% (25 Dec 2018 06:04) (99% - 100%)    Medications:  MEDICATIONS  (STANDING):  ALBUTerol/ipratropium for Nebulization 3 milliLiter(s) Nebulizer <User Schedule>  artificial tears (preservative free) Ophthalmic Solution 1 Drop(s) Both EYES three times a day  aspirin enteric coated 81 milliGRAM(s) Oral daily  azithromycin   Tablet 250 milliGRAM(s) Oral <User Schedule>  Biotene Dry Mouth Oral Rinse 5 milliLiter(s) Swish and Spit two times a day  buDESOnide   0.5 milliGRAM(s) Respule 0.5 milliGRAM(s) Inhalation every 12 hours  cholecalciferol 2000 Unit(s) Oral daily  dextrose 5%. 1000 milliLiter(s) (50 mL/Hr) IV Continuous <Continuous>  dextrose 50% Injectable 12.5 Gram(s) IV Push once  dextrose 50% Injectable 25 Gram(s) IV Push once  dextrose 50% Injectable 25 Gram(s) IV Push once  docusate sodium 100 milliGRAM(s) Oral daily  enoxaparin Injectable 40 milliGRAM(s) SubCutaneous every 24 hours  furosemide   Injectable 20 milliGRAM(s) IV Push daily  insulin glargine Injectable (LANTUS) 4 Unit(s) SubCutaneous at bedtime  insulin lispro (HumaLOG) corrective regimen sliding scale   SubCutaneous three times a day before meals  insulin lispro (HumaLOG) corrective regimen sliding scale   SubCutaneous at bedtime  insulin lispro Injectable (HumaLOG) 2 Unit(s) SubCutaneous with dinner  insulin lispro Injectable (HumaLOG) 3 Unit(s) SubCutaneous before breakfast  insulin lispro Injectable (HumaLOG) 3 Unit(s) SubCutaneous before lunch  isosorbide   mononitrate ER Tablet (IMDUR) 30 milliGRAM(s) Oral daily  melatonin 1 milliGRAM(s) Oral at bedtime  montelukast 10 milliGRAM(s) Oral daily  multivitamin 1 Tablet(s) Oral daily  nystatin    Suspension 100615 Unit(s) Oral four times a day  pantoprazole    Tablet 40 milliGRAM(s) Oral before breakfast  polyethylene glycol 3350 17 Gram(s) Oral daily  predniSONE   Tablet 30 milliGRAM(s) Oral <User Schedule>  Roflumilast (Daliresp) 250 MICROGram(s) 250 MICROGram(s) Oral daily  senna 2 Tablet(s) Oral at bedtime  simethicone 80 milliGRAM(s) Chew once  theophylline ER Capsule 400 milliGRAM(s) Oral daily    MEDICATIONS  (PRN):  bisacodyl Suppository 10 milliGRAM(s) Rectal daily PRN Constipation  dextrose 40% Gel 15 Gram(s) Oral once PRN Blood Glucose LESS THAN 70 milliGRAM(s)/deciliter  glucagon  Injectable 1 milliGRAM(s) IntraMuscular once PRN Glucose LESS THAN 70 milligrams/deciliter  ondansetron Injectable 4 milliGRAM(s) IV Push every 8 hours PRN Nausea and/or Vomiting  sodium chloride 0.65% Nasal 1 Spray(s) Both Nostrils two times a day PRN Nasal Congestion      Allergies    No Known Allergies    Intolerances        Physical Examination:  Pleasant. Slightly dyspneic and tachypneic with long conversations on 4 liters nasal O2.  Neck: no JVD, LAD, accessory muscle use  PULM: Distant breath sounds bilaterally. Prolonged expiratory phase. No wheezes, rhonchi, or rales.  CVS: RR  Abdomen: nontender  Extremities: improving LE edema    LAB:  Na+ 136  CO2 39  BUN 36  creatinine 0.99    Plan:  1. Continue Duoneb/Budesonide nebulizer tx.  2. Continue Singulair, Daliresp, and Theophylline.  3. On Prednisone 30mg PO daily. Would decrease to 20mg PO daily tomorrow.  4. Continue nocturnal BiPAP (12/5 with 40% FiO2). She can also wear during daytime hours if necessary.  5. Titrate nasal O2 during daytime to keep O2 sat ~ 95%.  6. On Protonix and SQ Lovenox.   7. OOB ---> chair as tolerated.  8. Diuresis as per Cardiology. Continue to monitor electrolytes and renal function. With serum CO2 of 39, consider changing Lasix to every other day.  9. Repeat Theophylline level tomorrow.  10.Followup CXR pending.  11.On Azithromycin for "anti-inflammatory" effects.  12.PTx as able.  13.? Transfer to [facility] Lung Transplant team vs inpatient Pulmonary Rehab program.    POC: David Caban M.D.  [phone]

## 2018-12-25 NOTE — PROGRESS NOTE ADULT - PROBLEM SELECTOR PLAN 1
c/w Prednisone 30mg po qd   Continue Pulmicort, Duoneb ATC   c/w Theophylline, Singulair.  Completed 7 days of biaxin   Home Trilogy vent arranged by pulmonary.  c/w intermittent bipap ; alternating with 5L NC  C/w daliresp  Pulm follow up with [facility]/transplant list  cw azithromycin   PT recs for eventual [facility] c/w Prednisone 20mg po qd   Continue Pulmicort, Duoneb ATC   c/w Theophylline, Singulair.  Completed 7 days of biaxin   Home Trilogy vent arranged by pulmonary.  c/w intermittent bipap ; alternating with 5L NC  C/w daliresp  Pulm follow up with [facility]/transplant list  cw azithromycin   PT recs for eventual [facility]O

## 2018-12-25 NOTE — PROGRESS NOTE ADULT - SUBJECTIVE AND OBJECTIVE BOX
Patient is a 58y old  Female who presents with a chief complaint of dyspnea (24 Dec 2018 14:24)      SUBJECTIVE / OVERNIGHT EVENTS: Refused evaluation/exam     MEDICATIONS  (STANDING):  ALBUTerol/ipratropium for Nebulization 3 milliLiter(s) Nebulizer <User Schedule>  artificial tears (preservative free) Ophthalmic Solution 1 Drop(s) Both EYES three times a day  aspirin enteric coated 81 milliGRAM(s) Oral daily  azithromycin   Tablet 250 milliGRAM(s) Oral <User Schedule>  Biotene Dry Mouth Oral Rinse 5 milliLiter(s) Swish and Spit two times a day  buDESOnide   0.5 milliGRAM(s) Respule 0.5 milliGRAM(s) Inhalation every 12 hours  cholecalciferol 2000 Unit(s) Oral daily  dextrose 5%. 1000 milliLiter(s) (50 mL/Hr) IV Continuous <Continuous>  dextrose 50% Injectable 12.5 Gram(s) IV Push once  dextrose 50% Injectable 25 Gram(s) IV Push once  dextrose 50% Injectable 25 Gram(s) IV Push once  docusate sodium 100 milliGRAM(s) Oral daily  enoxaparin Injectable 40 milliGRAM(s) SubCutaneous every 24 hours  furosemide   Injectable 20 milliGRAM(s) IV Push daily  insulin glargine Injectable (LANTUS) 4 Unit(s) SubCutaneous at bedtime  insulin lispro (HumaLOG) corrective regimen sliding scale   SubCutaneous three times a day before meals  insulin lispro (HumaLOG) corrective regimen sliding scale   SubCutaneous at bedtime  insulin lispro Injectable (HumaLOG) 2 Unit(s) SubCutaneous with dinner  insulin lispro Injectable (HumaLOG) 3 Unit(s) SubCutaneous before breakfast  insulin lispro Injectable (HumaLOG) 3 Unit(s) SubCutaneous before lunch  isosorbide   mononitrate ER Tablet (IMDUR) 30 milliGRAM(s) Oral daily  melatonin 1 milliGRAM(s) Oral at bedtime  montelukast 10 milliGRAM(s) Oral daily  multivitamin 1 Tablet(s) Oral daily  nystatin    Suspension 739820 Unit(s) Oral four times a day  pantoprazole    Tablet 40 milliGRAM(s) Oral before breakfast  polyethylene glycol 3350 17 Gram(s) Oral daily  predniSONE   Tablet 30 milliGRAM(s) Oral <User Schedule>  Roflumilast (Daliresp) 250 MICROGram(s) 250 MICROGram(s) Oral daily  senna 2 Tablet(s) Oral at bedtime  simethicone 80 milliGRAM(s) Chew once  theophylline ER Capsule 400 milliGRAM(s) Oral daily    MEDICATIONS  (PRN):  bisacodyl Suppository 10 milliGRAM(s) Rectal daily PRN Constipation  dextrose 40% Gel 15 Gram(s) Oral once PRN Blood Glucose LESS THAN 70 milliGRAM(s)/deciliter  glucagon  Injectable 1 milliGRAM(s) IntraMuscular once PRN Glucose LESS THAN 70 milligrams/deciliter  ondansetron Injectable 4 milliGRAM(s) IV Push every 8 hours PRN Nausea and/or Vomiting  sodium chloride 0.65% Nasal 1 Spray(s) Both Nostrils two times a day PRN Nasal Congestion        CAPILLARY BLOOD GLUCOSE      POCT Blood Glucose.: 180 mg/dL (25 Dec 2018 10:29)  POCT Blood Glucose.: 266 mg/dL (24 Dec 2018 22:12)  POCT Blood Glucose.: 266 mg/dL (24 Dec 2018 17:18)  POCT Blood Glucose.: 143 mg/dL (24 Dec 2018 13:29)    I&O's Summary    24 Dec 2018 07:01  -  25 Dec 2018 07:00  --------------------------------------------------------  IN: 960 mL / OUT: 1000 mL / NET: -40 mL        PHYSICAL EXAM:  Refused PE    LABS:    12-25    136  |  89<L>  |  36<H>  ----------------------------<  165<H>  4.1   |  39<H>  |  0.99    Ca    9.3      25 Dec 2018 11:01  Mg     1.8     12-25                RADIOLOGY & ADDITIONAL TESTS:    Imaging Personally Reviewed:    Consultant(s) Notes Reviewed:      Care Discussed with Consultants/Other Providers:

## 2018-12-26 LAB
ANION GAP SERPL CALC-SCNC: 10 MMOL/L — SIGNIFICANT CHANGE UP (ref 5–17)
BUN SERPL-MCNC: 38 MG/DL — HIGH (ref 7–23)
CALCIUM SERPL-MCNC: 9.9 MG/DL — SIGNIFICANT CHANGE UP (ref 8.4–10.5)
CHLORIDE SERPL-SCNC: 88 MMOL/L — LOW (ref 96–108)
CO2 SERPL-SCNC: 38 MMOL/L — HIGH (ref 22–31)
CREAT SERPL-MCNC: 1.1 MG/DL — SIGNIFICANT CHANGE UP (ref 0.5–1.3)
GLUCOSE BLDC GLUCOMTR-MCNC: 213 MG/DL — HIGH (ref 70–99)
GLUCOSE BLDC GLUCOMTR-MCNC: 218 MG/DL — HIGH (ref 70–99)
GLUCOSE BLDC GLUCOMTR-MCNC: 220 MG/DL — HIGH (ref 70–99)
GLUCOSE BLDC GLUCOMTR-MCNC: 222 MG/DL — HIGH (ref 70–99)
GLUCOSE SERPL-MCNC: 206 MG/DL — HIGH (ref 70–99)
HCT VFR BLD CALC: 31.8 % — LOW (ref 34.5–45)
HGB BLD-MCNC: 10.2 G/DL — LOW (ref 11.5–15.5)
MCHC RBC-ENTMCNC: 28.5 PG — SIGNIFICANT CHANGE UP (ref 27–34)
MCHC RBC-ENTMCNC: 32.1 GM/DL — SIGNIFICANT CHANGE UP (ref 32–36)
MCV RBC AUTO: 88.8 FL — SIGNIFICANT CHANGE UP (ref 80–100)
PLATELET # BLD AUTO: 105 K/UL — LOW (ref 150–400)
POTASSIUM SERPL-MCNC: 4.1 MMOL/L — SIGNIFICANT CHANGE UP (ref 3.5–5.3)
POTASSIUM SERPL-SCNC: 4.1 MMOL/L — SIGNIFICANT CHANGE UP (ref 3.5–5.3)
RBC # BLD: 3.58 M/UL — LOW (ref 3.8–5.2)
RBC # FLD: 13.6 % — SIGNIFICANT CHANGE UP (ref 10.3–14.5)
SODIUM SERPL-SCNC: 136 MMOL/L — SIGNIFICANT CHANGE UP (ref 135–145)
THEOPHYLLINE SERPL-MCNC: 5.6 UG/ML — LOW (ref 10–20)
WBC # BLD: 10.84 K/UL — HIGH (ref 3.8–10.5)
WBC # FLD AUTO: 10.84 K/UL — HIGH (ref 3.8–10.5)

## 2018-12-26 PROCEDURE — 99233 SBSQ HOSP IP/OBS HIGH 50: CPT

## 2018-12-26 PROCEDURE — 99232 SBSQ HOSP IP/OBS MODERATE 35: CPT

## 2018-12-26 RX ORDER — INSULIN GLARGINE 100 [IU]/ML
5 INJECTION, SOLUTION SUBCUTANEOUS AT BEDTIME
Qty: 0 | Refills: 0 | Status: DISCONTINUED | OUTPATIENT
Start: 2018-12-26 | End: 2018-12-30

## 2018-12-26 RX ORDER — INSULIN LISPRO 100/ML
4 VIAL (ML) SUBCUTANEOUS
Qty: 0 | Refills: 0 | Status: DISCONTINUED | OUTPATIENT
Start: 2018-12-26 | End: 2019-01-03

## 2018-12-26 RX ADMIN — Medication 3 MILLILITER(S): at 23:24

## 2018-12-26 RX ADMIN — Medication 3 MILLILITER(S): at 13:12

## 2018-12-26 RX ADMIN — MONTELUKAST 10 MILLIGRAM(S): 4 TABLET, CHEWABLE ORAL at 11:44

## 2018-12-26 RX ADMIN — Medication 1 DROP(S): at 13:10

## 2018-12-26 RX ADMIN — Medication 100 MILLIGRAM(S): at 11:46

## 2018-12-26 RX ADMIN — Medication 20 MILLIGRAM(S): at 06:06

## 2018-12-26 RX ADMIN — Medication 0.5 MILLIGRAM(S): at 06:06

## 2018-12-26 RX ADMIN — Medication 1 DROP(S): at 06:06

## 2018-12-26 RX ADMIN — Medication 5 MILLILITER(S): at 17:04

## 2018-12-26 RX ADMIN — Medication 2 UNIT(S): at 17:50

## 2018-12-26 RX ADMIN — INSULIN GLARGINE 5 UNIT(S): 100 INJECTION, SOLUTION SUBCUTANEOUS at 23:22

## 2018-12-26 RX ADMIN — Medication 2: at 17:49

## 2018-12-26 RX ADMIN — ENOXAPARIN SODIUM 40 MILLIGRAM(S): 100 INJECTION SUBCUTANEOUS at 23:23

## 2018-12-26 RX ADMIN — Medication 3 MILLILITER(S): at 17:08

## 2018-12-26 RX ADMIN — Medication 500000 UNIT(S): at 06:06

## 2018-12-26 RX ADMIN — Medication 3 MILLILITER(S): at 06:05

## 2018-12-26 RX ADMIN — Medication 3 MILLILITER(S): at 09:16

## 2018-12-26 RX ADMIN — Medication 3 UNIT(S): at 13:10

## 2018-12-26 RX ADMIN — SENNA PLUS 2 TABLET(S): 8.6 TABLET ORAL at 23:24

## 2018-12-26 RX ADMIN — PANTOPRAZOLE SODIUM 40 MILLIGRAM(S): 20 TABLET, DELAYED RELEASE ORAL at 06:06

## 2018-12-26 RX ADMIN — Medication 81 MILLIGRAM(S): at 11:46

## 2018-12-26 RX ADMIN — Medication 500000 UNIT(S): at 11:46

## 2018-12-26 RX ADMIN — ISOSORBIDE MONONITRATE 30 MILLIGRAM(S): 60 TABLET, EXTENDED RELEASE ORAL at 11:46

## 2018-12-26 RX ADMIN — Medication 2000 UNIT(S): at 11:45

## 2018-12-26 RX ADMIN — Medication 400 MILLIGRAM(S): at 09:16

## 2018-12-26 RX ADMIN — Medication 500000 UNIT(S): at 17:05

## 2018-12-26 RX ADMIN — Medication 0.5 MILLIGRAM(S): at 17:04

## 2018-12-26 RX ADMIN — Medication 2: at 13:10

## 2018-12-26 RX ADMIN — Medication 1 DROP(S): at 23:23

## 2018-12-26 RX ADMIN — Medication 2: at 09:15

## 2018-12-26 RX ADMIN — Medication 3 UNIT(S): at 09:15

## 2018-12-26 RX ADMIN — Medication 5 MILLILITER(S): at 06:06

## 2018-12-26 RX ADMIN — Medication 1 TABLET(S): at 11:45

## 2018-12-26 NOTE — PROGRESS NOTE ADULT - SUBJECTIVE AND OBJECTIVE BOX
Patient is a 58y old  Female who presents with a chief complaint of dyspnea (26 Dec 2018 11:35)      SUBJECTIVE / OVERNIGHT EVENTS: pt refusing to be evaluated     MEDICATIONS  (STANDING):  ALBUTerol/ipratropium for Nebulization 3 milliLiter(s) Nebulizer <User Schedule>  artificial tears (preservative free) Ophthalmic Solution 1 Drop(s) Both EYES three times a day  aspirin enteric coated 81 milliGRAM(s) Oral daily  azithromycin   Tablet 250 milliGRAM(s) Oral <User Schedule>  Biotene Dry Mouth Oral Rinse 5 milliLiter(s) Swish and Spit two times a day  buDESOnide   0.5 milliGRAM(s) Respule 0.5 milliGRAM(s) Inhalation every 12 hours  cholecalciferol 2000 Unit(s) Oral daily  dextrose 5%. 1000 milliLiter(s) (50 mL/Hr) IV Continuous <Continuous>  dextrose 50% Injectable 12.5 Gram(s) IV Push once  dextrose 50% Injectable 25 Gram(s) IV Push once  dextrose 50% Injectable 25 Gram(s) IV Push once  docusate sodium 100 milliGRAM(s) Oral daily  enoxaparin Injectable 40 milliGRAM(s) SubCutaneous every 24 hours  furosemide   Injectable 20 milliGRAM(s) IV Push daily  insulin glargine Injectable (LANTUS) 4 Unit(s) SubCutaneous at bedtime  insulin lispro (HumaLOG) corrective regimen sliding scale   SubCutaneous three times a day before meals  insulin lispro (HumaLOG) corrective regimen sliding scale   SubCutaneous at bedtime  insulin lispro Injectable (HumaLOG) 2 Unit(s) SubCutaneous with dinner  insulin lispro Injectable (HumaLOG) 3 Unit(s) SubCutaneous before breakfast  insulin lispro Injectable (HumaLOG) 3 Unit(s) SubCutaneous before lunch  isosorbide   mononitrate ER Tablet (IMDUR) 30 milliGRAM(s) Oral daily  melatonin 1 milliGRAM(s) Oral at bedtime  montelukast 10 milliGRAM(s) Oral daily  multivitamin 1 Tablet(s) Oral daily  nystatin    Suspension 707054 Unit(s) Oral four times a day  pantoprazole    Tablet 40 milliGRAM(s) Oral before breakfast  polyethylene glycol 3350 17 Gram(s) Oral daily  predniSONE   Tablet 20 milliGRAM(s) Oral daily  Roflumilast (Daliresp) 250 MICROGram(s) 250 MICROGram(s) Oral daily  senna 2 Tablet(s) Oral at bedtime  simethicone 80 milliGRAM(s) Chew once  theophylline ER Capsule 400 milliGRAM(s) Oral daily    MEDICATIONS  (PRN):  bisacodyl Suppository 10 milliGRAM(s) Rectal daily PRN Constipation  dextrose 40% Gel 15 Gram(s) Oral once PRN Blood Glucose LESS THAN 70 milliGRAM(s)/deciliter  glucagon  Injectable 1 milliGRAM(s) IntraMuscular once PRN Glucose LESS THAN 70 milligrams/deciliter  ondansetron Injectable 4 milliGRAM(s) IV Push every 8 hours PRN Nausea and/or Vomiting  sodium chloride 0.65% Nasal 1 Spray(s) Both Nostrils two times a day PRN Nasal Congestion        CAPILLARY BLOOD GLUCOSE      POCT Blood Glucose.: 220 mg/dL (26 Dec 2018 09:05)  POCT Blood Glucose.: 155 mg/dL (25 Dec 2018 23:31)  POCT Blood Glucose.: 191 mg/dL (25 Dec 2018 21:19)  POCT Blood Glucose.: 215 mg/dL (25 Dec 2018 18:45)  POCT Blood Glucose.: 246 mg/dL (25 Dec 2018 17:20)  POCT Blood Glucose.: 276 mg/dL (25 Dec 2018 17:18)  POCT Blood Glucose.: 366 mg/dL (25 Dec 2018 12:42)    I&O's Summary    25 Dec 2018 07:01  -  26 Dec 2018 07:00  --------------------------------------------------------  IN: 1200 mL / OUT: 1800 mL / NET: -600 mL    26 Dec 2018 07:01  -  26 Dec 2018 12:00  --------------------------------------------------------  IN: 240 mL / OUT: 0 mL / NET: 240 mL        PHYSICAL EXAM:  Refusing PE    LABS:                        10.2   10.84 )-----------( 105      ( 26 Dec 2018 09:52 )             31.8     12-26    136  |  88<L>  |  38<H>  ----------------------------<  206<H>  4.1   |  38<H>  |  1.10    Ca    9.9      26 Dec 2018 09:04  Mg     1.8     12-25                RADIOLOGY & ADDITIONAL TESTS:    Imaging Personally Reviewed:    Consultant(s) Notes Reviewed:  Pulm     Care Discussed with Consultants/Other Providers: Pulm

## 2018-12-26 NOTE — PROGRESS NOTE ADULT - ASSESSMENT
Plan:  1. Continue Duoneb/Budesonide nebulizer tx.  2. Continue Singulair, Daliresp, and Theophylline.  3. On Prednisone 20mg PO daily. 4. Continue nocturnal BiPAP (12/5 with 40% FiO2). She can also wear during daytime hours if necessary.  5. Titrate nasal O2 during daytime to keep O2 sat ~ 95%.  6. On Protonix and SQ Lovenox.   7. OOB ---> chair as tolerated.  8. Diuresis as per Cardiology. Continue to monitor electrolytes and renal function. With serum CO2 of 39, consider changing Lasix to every other day.  9. Repeat Theophylline level tomorrow.  10.Followup CXR pending.  11.On Azithromycin for "anti-inflammatory" effects.  12.PTx as able.  13.? Transfer to [facility] Lung Transplant team vs inpatient Pulmonary Rehab program- it would be very unusual for [facility] to accept the patient directly from an inpatient facility.  She needs to optimize her overall status. Specialized Pulmonary rehab would be the best option for her and I believe [place] planning for this is appropriate    Julieth Gan MD  [phone]  Pulmonary

## 2018-12-26 NOTE — PROGRESS NOTE ADULT - ASSESSMENT
58 F PMH COPD on home O2 3L, HTN, HLD, CAD s/p x6 stents, T2DM, pHTN, PVD, p/w progressive worsening dyspnea x 2 weeks now complicated by chronic progressive hypoxic respiratory failure.     1. Chronic progressive hypoxic respiratory failure  multifactorial in the setting of worsening COPD, CAD, chronic diastolic CHF, pulmonary HTN   volume status stablke, change lasix 20mg PO daily   echo with normal LV function, mild diastolic dysfunction, inadequate tricuspid regurg   cv stable no chest pain or sob   bipap PRN  continue dueonebs, inhaled steroids, singulair/theophylline/daliresp, pulm f/u   EKG reviewed, corrected QT WNL (aprox 462), on azithromycin    2. CAD s/p PCI  cv stable  continue asa, imdur    3. COPD   remains on bipap  continue Duoneb inhaled steroids, singulair/theophylline/daliresp, pulm f/u     4. hyponatremia/hyperkalemia  trend bmp  renal f/u     dvt ppx   dc planning per primary team

## 2018-12-26 NOTE — PROGRESS NOTE ADULT - ASSESSMENT
59 y/o F w/h/o controlled T2DM on Metformin 500mg bid. Also h/o CAD and COPD. Started Prednisone 20mg daily today with BG in 200s today likely due to improved PO intake even though steroid dose is coming down. Will adjust regimen to prevent hypo/severe hyperglycemia. Slow insulin adjustments since pt is insulin sensitive and has very unpredictable PO intake. BG goal (100-180mg/dl).

## 2018-12-26 NOTE — PROGRESS NOTE ADULT - SUBJECTIVE AND OBJECTIVE BOX
Follow-up Pulm Progress Note    No new respiratory events overnight.  Denies increased SOB, chest pain, cough or mucus.  still feels sob, but is comfortable at rest    Medications:  MEDICATIONS  (STANDING):  ALBUTerol/ipratropium for Nebulization 3 milliLiter(s) Nebulizer <User Schedule>  artificial tears (preservative free) Ophthalmic Solution 1 Drop(s) Both EYES three times a day  aspirin enteric coated 81 milliGRAM(s) Oral daily  azithromycin   Tablet 250 milliGRAM(s) Oral <User Schedule>  Biotene Dry Mouth Oral Rinse 5 milliLiter(s) Swish and Spit two times a day  buDESOnide   0.5 milliGRAM(s) Respule 0.5 milliGRAM(s) Inhalation every 12 hours  cholecalciferol 2000 Unit(s) Oral daily  dextrose 5%. 1000 milliLiter(s) (50 mL/Hr) IV Continuous <Continuous>  dextrose 50% Injectable 12.5 Gram(s) IV Push once  dextrose 50% Injectable 25 Gram(s) IV Push once  dextrose 50% Injectable 25 Gram(s) IV Push once  docusate sodium 100 milliGRAM(s) Oral daily  enoxaparin Injectable 40 milliGRAM(s) SubCutaneous every 24 hours  furosemide   Injectable 20 milliGRAM(s) IV Push daily  insulin glargine Injectable (LANTUS) 4 Unit(s) SubCutaneous at bedtime  insulin lispro (HumaLOG) corrective regimen sliding scale   SubCutaneous three times a day before meals  insulin lispro (HumaLOG) corrective regimen sliding scale   SubCutaneous at bedtime  insulin lispro Injectable (HumaLOG) 2 Unit(s) SubCutaneous with dinner  insulin lispro Injectable (HumaLOG) 3 Unit(s) SubCutaneous before breakfast  insulin lispro Injectable (HumaLOG) 3 Unit(s) SubCutaneous before lunch  isosorbide   mononitrate ER Tablet (IMDUR) 30 milliGRAM(s) Oral daily  melatonin 1 milliGRAM(s) Oral at bedtime  montelukast 10 milliGRAM(s) Oral daily  multivitamin 1 Tablet(s) Oral daily  nystatin    Suspension 504655 Unit(s) Oral four times a day  pantoprazole    Tablet 40 milliGRAM(s) Oral before breakfast  polyethylene glycol 3350 17 Gram(s) Oral daily  predniSONE   Tablet 20 milliGRAM(s) Oral daily  Roflumilast (Daliresp) 250 MICROGram(s) 250 MICROGram(s) Oral daily  senna 2 Tablet(s) Oral at bedtime  simethicone 80 milliGRAM(s) Chew once  theophylline ER Capsule 400 milliGRAM(s) Oral daily    MEDICATIONS  (PRN):  bisacodyl Suppository 10 milliGRAM(s) Rectal daily PRN Constipation  dextrose 40% Gel 15 Gram(s) Oral once PRN Blood Glucose LESS THAN 70 milliGRAM(s)/deciliter  glucagon  Injectable 1 milliGRAM(s) IntraMuscular once PRN Glucose LESS THAN 70 milligrams/deciliter  ondansetron Injectable 4 milliGRAM(s) IV Push every 8 hours PRN Nausea and/or Vomiting  sodium chloride 0.65% Nasal 1 Spray(s) Both Nostrils two times a day PRN Nasal Congestion      Vent settings (if applicable)      Vital Signs Last 24 Hrs  T(C): 36.3 (26 Dec 2018 06:02), Max: 36.6 (25 Dec 2018 12:29)  T(F): 97.3 (26 Dec 2018 06:02), Max: 97.9 (25 Dec 2018 12:29)  HR: 81 (26 Dec 2018 06:02) (81 - 106)  BP: 147/89 (26 Dec 2018 06:02) (131/69 - 147/89)  BP(mean): --  RR: 18 (26 Dec 2018 06:02) (18 - 20)  SpO2: 100% (26 Dec 2018 06:02) (98% - 100%)          12-25 @ 07:01  -  12-26 @ 07:00  --------------------------------------------------------  IN: 1200 mL / OUT: 1800 mL / NET: -600 mL          LABS:                        10.2   10.84 )-----------( 105      ( 26 Dec 2018 09:52 )             31.8     12-26    136  |  88<L>  |  38<H>  ----------------------------<  206<H>  4.1   |  38<H>  |  1.10    Ca    9.9      26 Dec 2018 09:04  Mg     1.8     12-25            CAPILLARY BLOOD GLUCOSE      POCT Blood Glucose.: 220 mg/dL (26 Dec 2018 09:05)              CULTURES:        Physical Examination:  Awake and alert, generally comfortable. obese  HEENT: unremarkable  PULM: Clear to auscultation bilaterally, no significant sputum production  CVS: Regular rate and rhythm, no murmurs, rubs, or gallops  Abd:  soft, non tender  Extrem: No CCE    RADIOLOGY REVIEWED  CXR:    CT chest:

## 2018-12-26 NOTE — PROGRESS NOTE ADULT - PROBLEM SELECTOR PLAN 1
c/w Prednisone 20mg po qd   Continue Pulmicort, Duoneb ATC   c/w Theophylline, Singulair.  Completed 7 days of biaxin   Home Trilogy vent arranged by pulmonary.  c/w intermittent bipap ; alternating with 5L NC  C/w daliresp  Pulm follow up with [facility]/transplant list  cw azithromycin   D/w pulmonary today plan for discharge to pulmonary rehab

## 2018-12-26 NOTE — PROGRESS NOTE ADULT - PROBLEM SELECTOR PLAN 1
-test BG AC/HS  -Increase Lantus dose to 5 units QHS  -Adjust Humalog 4-3-2 w/meals (hold if not eating)  -c/w Humalog low correction scale AC and Low HS scale  -Please notify endocrine when steroids further tapered so we can adjust the does of insulin!!!  discharge plan: restart home Metformin if off steroids  -Plan discussed with pt/team. Primary team aware of LUE edema  Contact info: [phone] (24/7). pager [phone]

## 2018-12-26 NOTE — PROGRESS NOTE ADULT - PROBLEM SELECTOR PLAN 8
Echo with  mild diastolic dysfunction, no significant changes from prior study in 2014.  c/w IV Lasix 20mg IVP  cardiology follow up, pt with uptrending bicarb levels likely change to ?po lasix

## 2018-12-26 NOTE — PROGRESS NOTE ADULT - SUBJECTIVE AND OBJECTIVE BOX
CARDIOLOGY FOLLOW UP - Dr. Brunson     no new complaints       PHYSICAL EXAM:  T(C): 36.6 (12-26-18 @ 14:11), Max: 36.6 (12-26-18 @ 14:11)  HR: 94 (12-26-18 @ 14:11) (81 - 101)  BP: 154/88 (12-26-18 @ 14:11) (131/69 - 154/88)  RR: 18 (12-26-18 @ 14:11) (18 - 20)  SpO2: 94% (12-26-18 @ 14:11) (94% - 100%)  Wt(kg): --  I&O's Summary    25 Dec 2018 07:01  -  26 Dec 2018 07:00  --------------------------------------------------------  IN: 1200 mL / OUT: 1800 mL / NET: -600 mL    26 Dec 2018 07:01  -  26 Dec 2018 15:29  --------------------------------------------------------  IN: 720 mL / OUT: 600 mL / NET: 120 mL        Appearance: Normal	  Cardiovascular: Normal S1 S2,RRR, No JVD, No murmurs  Respiratory: Diminished 	  Gastrointestinal:  Soft, Non-tender, + BS	  Extremities: Normal range of motion, No clubbing, cyanosis or edema        MEDICATIONS  (STANDING):  ALBUTerol/ipratropium for Nebulization 3 milliLiter(s) Nebulizer <User Schedule>  artificial tears (preservative free) Ophthalmic Solution 1 Drop(s) Both EYES three times a day  aspirin enteric coated 81 milliGRAM(s) Oral daily  azithromycin   Tablet 250 milliGRAM(s) Oral <User Schedule>  Biotene Dry Mouth Oral Rinse 5 milliLiter(s) Swish and Spit two times a day  buDESOnide   0.5 milliGRAM(s) Respule 0.5 milliGRAM(s) Inhalation every 12 hours  cholecalciferol 2000 Unit(s) Oral daily  dextrose 5%. 1000 milliLiter(s) (50 mL/Hr) IV Continuous <Continuous>  dextrose 50% Injectable 12.5 Gram(s) IV Push once  dextrose 50% Injectable 25 Gram(s) IV Push once  dextrose 50% Injectable 25 Gram(s) IV Push once  docusate sodium 100 milliGRAM(s) Oral daily  enoxaparin Injectable 40 milliGRAM(s) SubCutaneous every 24 hours  furosemide   Injectable 20 milliGRAM(s) IV Push daily  insulin glargine Injectable (LANTUS) 4 Unit(s) SubCutaneous at bedtime  insulin lispro (HumaLOG) corrective regimen sliding scale   SubCutaneous three times a day before meals  insulin lispro (HumaLOG) corrective regimen sliding scale   SubCutaneous at bedtime  insulin lispro Injectable (HumaLOG) 2 Unit(s) SubCutaneous with dinner  insulin lispro Injectable (HumaLOG) 3 Unit(s) SubCutaneous before breakfast  insulin lispro Injectable (HumaLOG) 3 Unit(s) SubCutaneous before lunch  isosorbide   mononitrate ER Tablet (IMDUR) 30 milliGRAM(s) Oral daily  melatonin 1 milliGRAM(s) Oral at bedtime  montelukast 10 milliGRAM(s) Oral daily  multivitamin 1 Tablet(s) Oral daily  nystatin    Suspension 721657 Unit(s) Oral four times a day  pantoprazole    Tablet 40 milliGRAM(s) Oral before breakfast  polyethylene glycol 3350 17 Gram(s) Oral daily  predniSONE   Tablet 20 milliGRAM(s) Oral daily  Roflumilast (Daliresp) 250 MICROGram(s) 250 MICROGram(s) Oral daily  senna 2 Tablet(s) Oral at bedtime  simethicone 80 milliGRAM(s) Chew once  theophylline ER Capsule 400 milliGRAM(s) Oral daily      TELEMETRY: 	    ECG:  	  RADIOLOGY:   DIAGNOSTIC TESTING:  [ ] Echocardiogram:  [ ]  Catheterization:  [ ] Stress Test:    OTHER: 	    LABS:	 	                                10.2   10.84 )-----------( 105      ( 26 Dec 2018 09:52 )             31.8     12-26    136  |  88<L>  |  38<H>  ----------------------------<  206<H>  4.1   |  38<H>  |  1.10    Ca    9.9      26 Dec 2018 09:04  Mg     1.8     12-25

## 2018-12-26 NOTE — PROGRESS NOTE ADULT - SUBJECTIVE AND OBJECTIVE BOX
Diabetes Follow up note: Saw pt earlier today  Interval Hx: 59 y/o F w/h/o controlled T2DM on Metformin 500mg bid. Also h/o CAD and COPD. Here with COPD exacerbation> now on Prednisone 20mg daily> started today. BG values 100s to 300s in the last 24 hours. Pt reports that even though she dislikes hospital food she is eating more now.  No hypoglycemia.    Review of Systems:  General: c/o LUE edema  GI: Tolerating POs without any N/V/D/ABD PAIN.  CV: No CP/SOB  ENDO: No S&Sx of hypoglycemia      MEDS:  insulin glargine Injectable (LANTUS) 4 Unit(s) SubCutaneous at bedtime  insulin lispro (HumaLOG) corrective regimen sliding scale   SubCutaneous three times a day before meals  insulin lispro (HumaLOG) corrective regimen sliding scale   SubCutaneous at bedtime  insulin lispro Injectable (HumaLOG) 3 Unit(s) SubCutaneous before breakfast  insulin lispro Injectable (HumaLOG) 3 Unit(s) SubCutaneous before lunch  insulin lispro Injectable (HumaLOG) 2 Unit(s) SubCutaneous with dinner  predniSONE   Tablet 20 milliGRAM(s) Oral <User Schedule>  nystatin    Suspension 309516 Unit(s) Oral four times a day  azithromycin   Tablet 250 milliGRAM(s) Oral <User Schedule>  cholecalciferol 2000 Unit(s) Oral daily  theophylline ER Capsule 400 milliGRAM(s) Oral daily    Allergies    No Known Allergies      PE:  General: Female sitting in bed in NAD.   Vital Signs Last 24 Hrs  T(C): 36.6 (12-26-18 @ 14:11), Max: 36.6 (12-26-18 @ 14:11)  T(F): 97.9 (12-26-18 @ 14:11), Max: 97.9 (12-26-18 @ 14:11)  HR: 94 (12-26-18 @ 14:11) (81 - 101)  BP: 154/88 (12-26-18 @ 14:11) (131/69 - 154/88)  BP(mean): --  RR: 18 (12-26-18 @ 14:11) (18 - 20)  SpO2: 94% (12-26-18 @ 14:11) (94% - 100%)  Abd: Soft, NT, ND, obese   Extremities: Warm. Bilateral LE 1+ with TOMAS stockings on. LUE edema   Neuro: A&O X3    LABS:  POCT Blood Glucose.: 218 mg/dL (12-26-18 @ 12:48)  POCT Blood Glucose.: 220 mg/dL (12-26-18 @ 09:05)  POCT Blood Glucose.: 155 mg/dL (12-25-18 @ 23:31)  POCT Blood Glucose.: 191 mg/dL (12-25-18 @ 21:19)  POCT Blood Glucose.: 215 mg/dL (12-25-18 @ 18:45)  POCT Blood Glucose.: 246 mg/dL (12-25-18 @ 17:20)  POCT Blood Glucose.: 276 mg/dL (12-25-18 @ 17:18)  POCT Blood Glucose.: 366 mg/dL (12-25-18 @ 12:42)  POCT Blood Glucose.: 180 mg/dL (12-25-18 @ 10:29)  POCT Blood Glucose.: 266 mg/dL (12-24-18 @ 22:12)  POCT Blood Glucose.: 266 mg/dL (12-24-18 @ 17:18)                          10.2   10.84 )-----------( 105      ( 26 Dec 2018 09:52 )             31.8     12-26    136  |  88<L>  |  38<H>  ----------------------------<  206<H>  4.1   |  38<H>  |  1.10    Ca    9.9      26 Dec 2018 09:04  Mg     1.8     12-25        Hemoglobin A1C, Whole Blood: 7.2 % <H> [4.0 - 5.6] (11-30-18 @ 07:58)

## 2018-12-27 LAB
ANION GAP SERPL CALC-SCNC: 10 MMOL/L — SIGNIFICANT CHANGE UP (ref 5–17)
BUN SERPL-MCNC: 38 MG/DL — HIGH (ref 7–23)
CALCIUM SERPL-MCNC: 9.7 MG/DL — SIGNIFICANT CHANGE UP (ref 8.4–10.5)
CHLORIDE SERPL-SCNC: 88 MMOL/L — LOW (ref 96–108)
CO2 SERPL-SCNC: 36 MMOL/L — HIGH (ref 22–31)
CREAT SERPL-MCNC: 0.99 MG/DL — SIGNIFICANT CHANGE UP (ref 0.5–1.3)
GLUCOSE BLDC GLUCOMTR-MCNC: 134 MG/DL — HIGH (ref 70–99)
GLUCOSE BLDC GLUCOMTR-MCNC: 162 MG/DL — HIGH (ref 70–99)
GLUCOSE BLDC GLUCOMTR-MCNC: 180 MG/DL — HIGH (ref 70–99)
GLUCOSE BLDC GLUCOMTR-MCNC: 220 MG/DL — HIGH (ref 70–99)
GLUCOSE SERPL-MCNC: 252 MG/DL — HIGH (ref 70–99)
HCT VFR BLD CALC: 32.8 % — LOW (ref 34.5–45)
HGB BLD-MCNC: 11.1 G/DL — LOW (ref 11.5–15.5)
MCHC RBC-ENTMCNC: 29.8 PG — SIGNIFICANT CHANGE UP (ref 27–34)
MCHC RBC-ENTMCNC: 33.9 GM/DL — SIGNIFICANT CHANGE UP (ref 32–36)
MCV RBC AUTO: 88.1 FL — SIGNIFICANT CHANGE UP (ref 80–100)
PLATELET # BLD AUTO: 122 K/UL — LOW (ref 150–400)
POTASSIUM SERPL-MCNC: 4.6 MMOL/L — SIGNIFICANT CHANGE UP (ref 3.5–5.3)
POTASSIUM SERPL-SCNC: 4.6 MMOL/L — SIGNIFICANT CHANGE UP (ref 3.5–5.3)
RBC # BLD: 3.73 M/UL — LOW (ref 3.8–5.2)
RBC # FLD: 13.7 % — SIGNIFICANT CHANGE UP (ref 10.3–14.5)
SODIUM SERPL-SCNC: 134 MMOL/L — LOW (ref 135–145)
THEOPHYLLINE SERPL-MCNC: 4.9 UG/ML — LOW (ref 10–20)
WBC # BLD: 10.4 K/UL — SIGNIFICANT CHANGE UP (ref 3.8–10.5)
WBC # FLD AUTO: 10.4 K/UL — SIGNIFICANT CHANGE UP (ref 3.8–10.5)

## 2018-12-27 PROCEDURE — 99253 IP/OBS CNSLTJ NEW/EST LOW 45: CPT

## 2018-12-27 PROCEDURE — 99232 SBSQ HOSP IP/OBS MODERATE 35: CPT

## 2018-12-27 PROCEDURE — 99233 SBSQ HOSP IP/OBS HIGH 50: CPT

## 2018-12-27 RX ORDER — FUROSEMIDE 40 MG
20 TABLET ORAL DAILY
Qty: 0 | Refills: 0 | Status: DISCONTINUED | OUTPATIENT
Start: 2018-12-27 | End: 2018-12-27

## 2018-12-27 RX ORDER — FUROSEMIDE 40 MG
40 TABLET ORAL DAILY
Qty: 0 | Refills: 0 | Status: DISCONTINUED | OUTPATIENT
Start: 2018-12-27 | End: 2019-01-15

## 2018-12-27 RX ORDER — INSULIN LISPRO 100/ML
4 VIAL (ML) SUBCUTANEOUS
Qty: 0 | Refills: 0 | Status: DISCONTINUED | OUTPATIENT
Start: 2018-12-28 | End: 2018-12-30

## 2018-12-27 RX ADMIN — Medication 1 DROP(S): at 21:52

## 2018-12-27 RX ADMIN — Medication 20 MILLIGRAM(S): at 06:07

## 2018-12-27 RX ADMIN — Medication 81 MILLIGRAM(S): at 12:53

## 2018-12-27 RX ADMIN — Medication 100 MILLIGRAM(S): at 12:54

## 2018-12-27 RX ADMIN — INSULIN GLARGINE 5 UNIT(S): 100 INJECTION, SOLUTION SUBCUTANEOUS at 21:55

## 2018-12-27 RX ADMIN — Medication 1: at 09:01

## 2018-12-27 RX ADMIN — ISOSORBIDE MONONITRATE 30 MILLIGRAM(S): 60 TABLET, EXTENDED RELEASE ORAL at 12:54

## 2018-12-27 RX ADMIN — Medication 20 MILLIGRAM(S): at 13:04

## 2018-12-27 RX ADMIN — ENOXAPARIN SODIUM 40 MILLIGRAM(S): 100 INJECTION SUBCUTANEOUS at 21:53

## 2018-12-27 RX ADMIN — Medication 0.5 MILLIGRAM(S): at 06:14

## 2018-12-27 RX ADMIN — MONTELUKAST 10 MILLIGRAM(S): 4 TABLET, CHEWABLE ORAL at 12:54

## 2018-12-27 RX ADMIN — Medication 2: at 12:55

## 2018-12-27 RX ADMIN — Medication 3 MILLILITER(S): at 18:06

## 2018-12-27 RX ADMIN — AZITHROMYCIN 250 MILLIGRAM(S): 500 TABLET, FILM COATED ORAL at 09:10

## 2018-12-27 RX ADMIN — Medication 400 MILLIGRAM(S): at 09:09

## 2018-12-27 RX ADMIN — Medication 3 MILLILITER(S): at 14:44

## 2018-12-27 RX ADMIN — ONDANSETRON 4 MILLIGRAM(S): 8 TABLET, FILM COATED ORAL at 11:49

## 2018-12-27 RX ADMIN — Medication 500000 UNIT(S): at 12:54

## 2018-12-27 RX ADMIN — Medication 3 MILLILITER(S): at 09:03

## 2018-12-27 RX ADMIN — Medication 2000 UNIT(S): at 12:54

## 2018-12-27 RX ADMIN — Medication 0.5 MILLIGRAM(S): at 18:06

## 2018-12-27 RX ADMIN — Medication 500000 UNIT(S): at 18:06

## 2018-12-27 RX ADMIN — Medication 3 UNIT(S): at 12:55

## 2018-12-27 RX ADMIN — Medication 1 TABLET(S): at 12:54

## 2018-12-27 RX ADMIN — Medication 4 UNIT(S): at 09:00

## 2018-12-27 RX ADMIN — Medication 2 UNIT(S): at 18:04

## 2018-12-27 RX ADMIN — Medication 3 MILLILITER(S): at 21:52

## 2018-12-27 RX ADMIN — SENNA PLUS 2 TABLET(S): 8.6 TABLET ORAL at 21:53

## 2018-12-27 RX ADMIN — PANTOPRAZOLE SODIUM 40 MILLIGRAM(S): 20 TABLET, DELAYED RELEASE ORAL at 06:07

## 2018-12-27 RX ADMIN — Medication 20 MILLIGRAM(S): at 06:08

## 2018-12-27 NOTE — PROGRESS NOTE ADULT - SUBJECTIVE AND OBJECTIVE BOX
Diabetes Follow up note:  Interval Hx:  57 y/o F w/h/o controlled T2DM on Metformin 500mg bid. Also h/o CAD and COPD. Here with COPD exacerbation> now on Prednisone 20mg daily. BG values mid 100's-200s over past 24 hours on present insulin regimen. Pt seen at bedside w/ present. On bipap. Discharge planning to pul rehab ongoing. Reports eating more consistently.    Review of Systems:  General: + LE edema/foot pain  GI: Tolerating POs without any N/V/D/ABD PAIN.  Resp: SOB  ENDO: No S&Sx of hypoglycemia  MEDS:    insulin glargine Injectable (LANTUS) 5 Unit(s) SubCutaneous at bedtime  insulin lispro (HumaLOG) corrective regimen sliding scale   SubCutaneous three times a day before meals  insulin lispro (HumaLOG) corrective regimen sliding scale   SubCutaneous at bedtime  insulin lispro Injectable (HumaLOG) 2 Unit(s) SubCutaneous with dinner  insulin lispro Injectable (HumaLOG) 3 Unit(s) SubCutaneous before lunch  insulin lispro Injectable (HumaLOG) 4 Unit(s) SubCutaneous before breakfast  predniSONE   Tablet 20 milliGRAM(s) Oral daily    azithromycin   Tablet 250 milliGRAM(s) Oral <User Schedule>  nystatin    Suspension 242790 Unit(s) Oral four times a day    Allergies    No Known Allergies        PE:  General: Female sitting in chair. On BIPAP  Vital Signs Last 24 Hrs  T(C): 36.6 (27 Dec 2018 13:06), Max: 36.6 (26 Dec 2018 22:10)  T(F): 97.9 (27 Dec 2018 13:06), Max: 97.9 (26 Dec 2018 22:10)  HR: 102 (27 Dec 2018 13:06) (89 - 102)  BP: 154/84 (27 Dec 2018 13:06) (129/74 - 154/84)  BP(mean): --  RR: 18 (27 Dec 2018 05:53) (18 - 18)  SpO2: 100% (27 Dec 2018 10:14) (97% - 100%)  Abd: Soft, NT,ND, Obese.   Extremities: Warm. B/L LE pedal edema. LUE edema.   Neuro: A&O X3    LABS:    POCT Blood Glucose.: 220 mg/dL (12-27-18 @ 12:37)  POCT Blood Glucose.: 162 mg/dL (12-27-18 @ 08:38)  POCT Blood Glucose.: 213 mg/dL (12-26-18 @ 21:22)  POCT Blood Glucose.: 222 mg/dL (12-26-18 @ 17:37)  POCT Blood Glucose.: 218 mg/dL (12-26-18 @ 12:48)  POCT Blood Glucose.: 220 mg/dL (12-26-18 @ 09:05)  POCT Blood Glucose.: 155 mg/dL (12-25-18 @ 23:31)  POCT Blood Glucose.: 191 mg/dL (12-25-18 @ 21:19)  POCT Blood Glucose.: 215 mg/dL (12-25-18 @ 18:45)  POCT Blood Glucose.: 246 mg/dL (12-25-18 @ 17:20)  POCT Blood Glucose.: 276 mg/dL (12-25-18 @ 17:18)  POCT Blood Glucose.: 366 mg/dL (12-25-18 @ 12:42)  POCT Blood Glucose.: 180 mg/dL (12-25-18 @ 10:29)  POCT Blood Glucose.: 266 mg/dL (12-24-18 @ 22:12)  POCT Blood Glucose.: 266 mg/dL (12-24-18 @ 17:18)                            11.1   10.4  )-----------( 122      ( 27 Dec 2018 12:04 )             32.8       12-27    134<L>  |  88<L>  |  38<H>  ----------------------------<  252<H>  4.6   |  36<H>  |  0.99    Ca    9.7      27 Dec 2018 11:55          Hemoglobin A1C, Whole Blood: 7.2 % <H> [4.0 - 5.6] (11-30-18 @ 07:58)            Contact number: kateryna [phone] or [phone]

## 2018-12-27 NOTE — PROGRESS NOTE ADULT - PROBLEM SELECTOR PLAN 1
-test BG AC/HS  -c/w Lantus 5 units QHS  -Adjust Humalog 4-4-2 w/meals for now. Will wait to assess for further pattern prior to making more changes  -c/w Humalog low correction scale AC and Low HS scale  Please notify endocrine when steroids further tapered so we can adjust the does of insulin!!!  discharge plan: restart home Metformin if off steroids  pager: [phone]

## 2018-12-27 NOTE — PROGRESS NOTE ADULT - PROBLEM SELECTOR PLAN 6
Dopplers negative for DVT.  Echo report noted, mild diastolic dysfunction, no significant changes from prior study in 2014.  Suspect leg edema may be related to recent high dose steroids.  UE dopplers previously negative, pt with increase LUE edema, will check dopplers r/o dvt   lasix 40mg qd

## 2018-12-27 NOTE — PROGRESS NOTE ADULT - PROBLEM SELECTOR PLAN 1
c/w Prednisone 20mg po qd   Continue Pulmicort, Duoneb ATC   c/w Theophylline, Singulair.  Completed 7 days of biaxin   Home Trilogy vent arranged by pulmonary.  c/w intermittent bipap ; alternating with 5L NC  C/w daliresp  Pulm follow up with [place]/transplant list  cw azithromycin   D/w pulmonary today plan for discharge to acute pulmonary rehab  Appreciate pm&r recs

## 2018-12-27 NOTE — PROGRESS NOTE ADULT - SUBJECTIVE AND OBJECTIVE BOX
Patient is a 58y old  Female who presents with a chief complaint of dyspnea (27 Dec 2018 11:30)      SUBJECTIVE / OVERNIGHT EVENTS: still with dyspnea, reports worse LUE swelling, had bm, no cp, swelling still in LE b/l    MEDICATIONS  (STANDING):  ALBUTerol/ipratropium for Nebulization 3 milliLiter(s) Nebulizer <User Schedule>  artificial tears (preservative free) Ophthalmic Solution 1 Drop(s) Both EYES three times a day  aspirin enteric coated 81 milliGRAM(s) Oral daily  azithromycin   Tablet 250 milliGRAM(s) Oral <User Schedule>  Biotene Dry Mouth Oral Rinse 5 milliLiter(s) Swish and Spit two times a day  buDESOnide   0.5 milliGRAM(s) Respule 0.5 milliGRAM(s) Inhalation every 12 hours  cholecalciferol 2000 Unit(s) Oral daily  dextrose 5%. 1000 milliLiter(s) (50 mL/Hr) IV Continuous <Continuous>  dextrose 50% Injectable 12.5 Gram(s) IV Push once  dextrose 50% Injectable 25 Gram(s) IV Push once  dextrose 50% Injectable 25 Gram(s) IV Push once  docusate sodium 100 milliGRAM(s) Oral daily  enoxaparin Injectable 40 milliGRAM(s) SubCutaneous every 24 hours  furosemide    Tablet 40 milliGRAM(s) Oral daily  insulin glargine Injectable (LANTUS) 5 Unit(s) SubCutaneous at bedtime  insulin lispro (HumaLOG) corrective regimen sliding scale   SubCutaneous three times a day before meals  insulin lispro (HumaLOG) corrective regimen sliding scale   SubCutaneous at bedtime  insulin lispro Injectable (HumaLOG) 2 Unit(s) SubCutaneous with dinner  insulin lispro Injectable (HumaLOG) 3 Unit(s) SubCutaneous before lunch  insulin lispro Injectable (HumaLOG) 4 Unit(s) SubCutaneous before breakfast  isosorbide   mononitrate ER Tablet (IMDUR) 30 milliGRAM(s) Oral daily  melatonin 1 milliGRAM(s) Oral at bedtime  montelukast 10 milliGRAM(s) Oral daily  multivitamin 1 Tablet(s) Oral daily  nystatin    Suspension 686444 Unit(s) Oral four times a day  pantoprazole    Tablet 40 milliGRAM(s) Oral before breakfast  polyethylene glycol 3350 17 Gram(s) Oral daily  predniSONE   Tablet 20 milliGRAM(s) Oral daily  Roflumilast (Daliresp) 250 MICROGram(s) 250 MICROGram(s) Oral daily  senna 2 Tablet(s) Oral at bedtime  simethicone 80 milliGRAM(s) Chew once  theophylline ER Capsule 400 milliGRAM(s) Oral daily    MEDICATIONS  (PRN):  bisacodyl Suppository 10 milliGRAM(s) Rectal daily PRN Constipation  dextrose 40% Gel 15 Gram(s) Oral once PRN Blood Glucose LESS THAN 70 milliGRAM(s)/deciliter  glucagon  Injectable 1 milliGRAM(s) IntraMuscular once PRN Glucose LESS THAN 70 milligrams/deciliter  ondansetron Injectable 4 milliGRAM(s) IV Push every 8 hours PRN Nausea and/or Vomiting  sodium chloride 0.65% Nasal 1 Spray(s) Both Nostrils two times a day PRN Nasal Congestion        CAPILLARY BLOOD GLUCOSE      POCT Blood Glucose.: 220 mg/dL (27 Dec 2018 12:37)  POCT Blood Glucose.: 162 mg/dL (27 Dec 2018 08:38)  POCT Blood Glucose.: 213 mg/dL (26 Dec 2018 21:22)  POCT Blood Glucose.: 222 mg/dL (26 Dec 2018 17:37)    I&O's Summary    26 Dec 2018 07:01  -  27 Dec 2018 07:00  --------------------------------------------------------  IN: 1440 mL / OUT: 1200 mL / NET: 240 mL    27 Dec 2018 07:01  -  27 Dec 2018 13:35  --------------------------------------------------------  IN: 0 mL / OUT: 1825 mL / NET: -1825 mL        PHYSICAL EXAM:  GENERAL: NAD, well-developed  HEAD:  Atraumatic, Normocephalic  EYES: conjunctiva and sclera clear  NECK: No JVD  CHEST/LUNG: Clear to auscultation bilaterally; No wheeze  HEART: Regular rate and rhythm; S1S2  ABDOMEN: Soft, Nontender, Nondistended; Bowel sounds present  EXTREMITIES:  2+ UE bl, compression stockings le b/l   PSYCH: AAOx3  NEUROLOGY: non-focal  SKIN: No rashes or lesions    LABS:                        11.1   10.4  )-----------( 122      ( 27 Dec 2018 12:04 )             32.8     12-27    134<L>  |  88<L>  |  38<H>  ----------------------------<  252<H>  4.6   |  36<H>  |  0.99    Ca    9.7      27 Dec 2018 11:55                RADIOLOGY & ADDITIONAL TESTS:    Imaging Personally Reviewed:    Consultant(s) Notes Reviewed:  cardio, pulm     Care Discussed with Consultants/Other Providers: pulm

## 2018-12-27 NOTE — CHART NOTE - NSCHARTNOTEFT_GEN_A_CORE
Nutrition Follow Up Note  Patient seen for: follow up. Pt is a 57 y/o female with PMH of T2DM, HTN, HLD, CAD, PVD and home O2. Pt admitted for dyspnea. Pt with COPD exacerbation with progressive worsening dyspnea complicated by progressive hypoxic respiratory failure. Pt on nocturnal BiPap and can use BiPaP during the day if needed. As per cardiology, pt with diuresis, continue to monitor electrolytes and renal function. Pt s/p EKG (12/21) with normal LV function and mild diastolic function. Pt with leg edema, doppler negative for DVT, leukocytosis secondary to steroids, and afib.     Source: Patient, electronic medical record, previous RD assessments    Diet : Consistent CHO with evening snack, no concentrated Potassium, Glucerna once daily     Patient reports: Pt reports nausea but denies vomiting. Pt denies diarrhea/constipation. Last bowel movement noted 12/27.    PO intake : Pt reports good appetite and po intake of 75% of meal tray and is tolerating Glucerna well. Pt's food preferences obtained and honored.     Source for PO intake: Patient    Daily   % Weight Change    Pertinent Medications: MEDICATIONS  (STANDING):  ALBUTerol/ipratropium for Nebulization 3 milliLiter(s) Nebulizer <User Schedule>  artificial tears (preservative free) Ophthalmic Solution 1 Drop(s) Both EYES three times a day  aspirin enteric coated 81 milliGRAM(s) Oral daily  azithromycin   Tablet 250 milliGRAM(s) Oral <User Schedule>  Biotene Dry Mouth Oral Rinse 5 milliLiter(s) Swish and Spit two times a day  buDESOnide   0.5 milliGRAM(s) Respule 0.5 milliGRAM(s) Inhalation every 12 hours  cholecalciferol 2000 Unit(s) Oral daily  dextrose 5%. 1000 milliLiter(s) (50 mL/Hr) IV Continuous <Continuous>  dextrose 50% Injectable 12.5 Gram(s) IV Push once  dextrose 50% Injectable 25 Gram(s) IV Push once  dextrose 50% Injectable 25 Gram(s) IV Push once  docusate sodium 100 milliGRAM(s) Oral daily  enoxaparin Injectable 40 milliGRAM(s) SubCutaneous every 24 hours  furosemide    Tablet 40 milliGRAM(s) Oral daily  insulin glargine Injectable (LANTUS) 5 Unit(s) SubCutaneous at bedtime  insulin lispro (HumaLOG) corrective regimen sliding scale   SubCutaneous three times a day before meals  insulin lispro (HumaLOG) corrective regimen sliding scale   SubCutaneous at bedtime  insulin lispro Injectable (HumaLOG) 2 Unit(s) SubCutaneous with dinner  insulin lispro Injectable (HumaLOG) 4 Unit(s) SubCutaneous before breakfast  isosorbide   mononitrate ER Tablet (IMDUR) 30 milliGRAM(s) Oral daily  melatonin 1 milliGRAM(s) Oral at bedtime  montelukast 10 milliGRAM(s) Oral daily  multivitamin 1 Tablet(s) Oral daily  nystatin    Suspension 716440 Unit(s) Oral four times a day  pantoprazole    Tablet 40 milliGRAM(s) Oral before breakfast  polyethylene glycol 3350 17 Gram(s) Oral daily  predniSONE   Tablet 20 milliGRAM(s) Oral daily  Roflumilast (Daliresp) 250 MICROGram(s) 250 MICROGram(s) Oral daily  senna 2 Tablet(s) Oral at bedtime  simethicone 80 milliGRAM(s) Chew once  theophylline ER Capsule 400 milliGRAM(s) Oral daily    MEDICATIONS  (PRN):  bisacodyl Suppository 10 milliGRAM(s) Rectal daily PRN Constipation  dextrose 40% Gel 15 Gram(s) Oral once PRN Blood Glucose LESS THAN 70 milliGRAM(s)/deciliter  glucagon  Injectable 1 milliGRAM(s) IntraMuscular once PRN Glucose LESS THAN 70 milligrams/deciliter  ondansetron Injectable 4 milliGRAM(s) IV Push every 8 hours PRN Nausea and/or Vomiting  sodium chloride 0.65% Nasal 1 Spray(s) Both Nostrils two times a day PRN Nasal Congestion    Pertinent Labs: 12-27 @ 11:55: Na 134<L>, BUN 38<H>, Cr 0.99, <H>, K+ 4.6, Phos --, Mg --, Alk Phos --, ALT/SGPT --, AST/SGOT --, HbA1c --    Finger Sticks:  POCT Blood Glucose.: 220 mg/dL (12-27 @ 12:37)  POCT Blood Glucose.: 162 mg/dL (12-27 @ 08:38)  POCT Blood Glucose.: 213 mg/dL (12-26 @ 21:22)  POCT Blood Glucose.: 222 mg/dL (12-26 @ 17:37)      Skin per nursing documentation:   Edema:    Estimated Needs:   [ ] no change since previous assessment  [ ] recalculated:     Previous Nutrition Diagnosis:   Nutrition Diagnosis is:    New Nutrition Diagnosis:  Related to:    As evidenced by:      Interventions:     Recommend  1)    Monitoring and Evaluation:     Continue to monitor Nutritional intake, Tolerance to diet prescription, weights, labs, skin integrity    RD remains available upon request and will follow up per protocol Nutrition Follow Up Note  Patient seen for: follow up. Pt is a 59 y/o female with PMH of T2DM, HTN, HLD, CAD, PVD and home O2. Pt admitted for dyspnea. Pt with COPD exacerbation with progressive worsening dyspnea complicated by progressive hypoxic respiratory failure. Pt on nocturnal BiPap and can use BiPaP during the day if needed. As per cardiology, pt with diuresis, continue to monitor electrolytes and renal function. Pt s/p EKG (12/21) with normal LV function and mild diastolic function. Pt with leg edema, doppler negative for DVT, leukocytosis secondary to steroids, and afib.     Source: Patient, electronic medical record, previous RD assessments    Diet : Consistent CHO with evening snack, no concentrated Potassium, Glucerna once daily     Patient reports: Pt reports nausea but denies vomiting. Pt denies diarrhea/constipation. Last bowel movement noted 12/27.     PO intake : Pt reports good appetite and po intake of 75% of meal tray and is tolerating Glucerna well. Pt's food preferences obtained and honored. As per last RD note, the pt's diet had been liberalized to allow mashed potatoes within low potassium diet regimen (1 portion is 390mg K+) however, pt denies receiving mashed potatoes and no longer wishes to receive mashed potatoes at this time.     Source for PO intake: Patient    Daily: (12/19) 207 pounds  % Weight Change: ?accuracy of bed scale    Pertinent Medications: MEDICATIONS  (STANDING):  cholecalciferol 2000 Unit(s) Oral daily  furosemide    Tablet 40 milliGRAM(s) Oral daily  insulin glargine Injectable (LANTUS) 5 Unit(s) SubCutaneous at bedtime  insulin lispro (HumaLOG) corrective regimen sliding scale   SubCutaneous three times a day before meals  insulin lispro (HumaLOG) corrective regimen sliding scale   SubCutaneous at bedtime  insulin lispro Injectable (HumaLOG) 2 Unit(s) SubCutaneous with dinner  insulin lispro Injectable (HumaLOG) 4 Unit(s) SubCutaneous before breakfast  multivitamin 1 Tablet(s) Oral daily  pantoprazole    Tablet 40 milliGRAM(s) Oral before breakfast  polyethylene glycol 3350 17 Gram(s) Oral daily  predniSONE   Tablet 20 milliGRAM(s) Oral daily  senna 2 Tablet(s) Oral at bedtime    MEDICATIONS  (PRN):  bisacodyl Suppository 10 milliGRAM(s) Rectal daily PRN Constipation  ondansetron Injectable 4 milliGRAM(s) IV Push every 8 hours PRN Nausea and/or Vomiting    Pertinent Labs: 12-27 @ 11:55: Na 134<L>, <H>, K+ 4.6,    Finger Sticks:  POCT Blood Glucose.: 220 mg/dL (12-27 @ 12:37)  POCT Blood Glucose.: 162 mg/dL (12-27 @ 08:38)  POCT Blood Glucose.: 213 mg/dL (12-26 @ 21:22)  POCT Blood Glucose.: 222 mg/dL (12-26 @ 17:37)      Skin per nursing documentation: intact  Edema: +1 b/l legs, +2 b/l feet    Estimated Needs:   [ x] no change since previous assessment  [ ] recalculated:     Previous Nutrition Diagnosis: inadequate protein-energy intake  Nutrition Diagnosis is: ongoing, being addressed with continuing to obtain pt's food preferences     Recommend  1)    Monitoring and Evaluation:     Continue to monitor Nutritional intake, Tolerance to diet prescription, weights, labs, skin integrity    RD remains available upon request and will follow up per protocol  Neris Jimenez, Dietetic Intern  Pager number [phone] Nutrition Follow Up Note  Patient seen for: follow up. Pt is a 59 y/o female with PMH of T2DM, HTN, HLD, CAD, PVD and home O2. Pt admitted for dyspnea. Pt with COPD exacerbation with progressive worsening dyspnea complicated by progressive hypoxic respiratory failure. Pt on nocturnal BiPap and can use BiPaP during the day if needed. As per cardiology, pt with diuresis, continue to monitor electrolytes and renal function. Pt s/p EKG (12/21) with normal LV function and mild diastolic function. Pt with leg edema, doppler negative for DVT, leukocytosis secondary to steroids, and afib.     Source: Patient, electronic medical record, previous RD assessments    Diet : Consistent CHO with evening snack, no concentrated Potassium, Glucerna once daily     Patient reports: Pt reports nausea but denies vomiting. Pt denies diarrhea/constipation. Last bowel movement noted 12/27.     PO intake : Pt reports good appetite and po intake of 75% of meal tray and is tolerating Glucerna well. Pt's food preferences obtained and honored. As per last RD note, the pt's diet had been liberalized to allow mashed potatoes within low potassium diet regimen (1 portion is 390mg K+) however, pt denies receiving mashed potatoes and no longer wishes to receive mashed potatoes at this time.     Source for PO intake: Patient    Daily: (12/19) 207 pounds  % Weight Change: ?accuracy of bed scale    Pertinent Medications: MEDICATIONS  (STANDING):  cholecalciferol 2000 Unit(s) Oral daily  furosemide    Tablet 40 milliGRAM(s) Oral daily  insulin glargine Injectable (LANTUS) 5 Unit(s) SubCutaneous at bedtime  insulin lispro (HumaLOG) corrective regimen sliding scale   SubCutaneous three times a day before meals  insulin lispro (HumaLOG) corrective regimen sliding scale   SubCutaneous at bedtime  insulin lispro Injectable (HumaLOG) 2 Unit(s) SubCutaneous with dinner  insulin lispro Injectable (HumaLOG) 4 Unit(s) SubCutaneous before breakfast  multivitamin 1 Tablet(s) Oral daily  pantoprazole    Tablet 40 milliGRAM(s) Oral before breakfast  polyethylene glycol 3350 17 Gram(s) Oral daily  predniSONE   Tablet 20 milliGRAM(s) Oral daily  senna 2 Tablet(s) Oral at bedtime    MEDICATIONS  (PRN):  bisacodyl Suppository 10 milliGRAM(s) Rectal daily PRN Constipation  ondansetron Injectable 4 milliGRAM(s) IV Push every 8 hours PRN Nausea and/or Vomiting    Pertinent Labs: 12-27 @ 11:55: Na 134<L>, <H>, K+ 4.6,    Finger Sticks:  POCT Blood Glucose.: 220 mg/dL (12-27 @ 12:37)  POCT Blood Glucose.: 162 mg/dL (12-27 @ 08:38)  POCT Blood Glucose.: 213 mg/dL (12-26 @ 21:22)  POCT Blood Glucose.: 222 mg/dL (12-26 @ 17:37)      Skin per nursing documentation: intact  Edema: +1 b/l legs, +2 b/l feet    Estimated Needs:   [ x] no change since previous assessment  [ ] recalculated:     Previous Nutrition Diagnosis: inadequate protein-energy intake  Nutrition Diagnosis is: ongoing, being addressed with continuing to obtain pt's food preferences     Recommend  1) Continue current consistent CHO diet with evening snack and no concentrated potassium  2) Continue to monitor Potassium and electrolytes  3) Food preferences obtained and honored. Continue to obtain food preferences  4) Continue to monitor weights, GI tolerance, edema and skin integrity  5) Reviewed with patient consistent CHO diet and foods high in potassium, pt displayed good understanding from previous dietitian      Monitoring and Evaluation:     Continue to monitor Nutritional intake, Tolerance to diet prescription, weights, labs, skin integrity    RD remains available upon request and will follow up per protocol  Neris Jimenez, Dietetic Intern  Pager number [phone] Nutrition Follow Up Note  Patient seen for: follow up. Pt is a 57 y/o female with PMH of T2DM, HTN, HLD, CAD, PVD and home O2. Pt admitted for dyspnea. Pt with COPD exacerbation with progressive worsening dyspnea complicated by progressive hypoxic respiratory failure. Pt on nocturnal BiPAP and can use BiPAP during the day if needed. As per cardiology, pt with diuresis, continue to monitor electrolytes and renal function. Pt s/p EKG (12/21) with normal LV function and mild diastolic function. Pt with leg edema, doppler negative for DVT, leukocytosis secondary to steroids, and afib.     Source: Patient, electronic medical record, previous RD assessments    Diet : Consistent CHO with evening snack, no concentrated Potassium, Glucerna once daily     Patient reports: Pt reports nausea but denies vomiting. Pt denies diarrhea/constipation. Last bowel movement noted 12/27.     PO intake : Pt reports good appetite and po intake of 75% of meal tray and is tolerating Glucerna well. Pt requested to add prunes daily and apple slices daily and to remove [employer]ake from meal trays. As per last RD note, the pt's diet had been liberalized to allow mashed potatoes within low potassium diet regimen (1 portion is 390mg K+) however, pt denies receiving mashed potatoes and no longer wishes to receive mashed potatoes at this time. Pt's food preferences obtained and honored.     Source for PO intake: Patient    Daily: (11/29) 185 pounds, (12/19) 207 pounds  % Weight Change: Noted weight gain, possibly due to fluid retention and ?accuracy of bed scale.    Pertinent Medications: MEDICATIONS  (STANDING):  cholecalciferol 2000 Unit(s) Oral daily  furosemide    Tablet 40 milliGRAM(s) Oral daily  insulin glargine Injectable (LANTUS) 5 Unit(s) SubCutaneous at bedtime  insulin lispro (HumaLOG) corrective regimen sliding scale   SubCutaneous three times a day before meals  insulin lispro (HumaLOG) corrective regimen sliding scale   SubCutaneous at bedtime  insulin lispro Injectable (HumaLOG) 2 Unit(s) SubCutaneous with dinner  insulin lispro Injectable (HumaLOG) 4 Unit(s) SubCutaneous before breakfast  multivitamin 1 Tablet(s) Oral daily  pantoprazole    Tablet 40 milliGRAM(s) Oral before breakfast  polyethylene glycol 3350 17 Gram(s) Oral daily  predniSONE   Tablet 20 milliGRAM(s) Oral daily  senna 2 Tablet(s) Oral at bedtime    MEDICATIONS  (PRN):  bisacodyl Suppository 10 milliGRAM(s) Rectal daily PRN Constipation  ondansetron Injectable 4 milliGRAM(s) IV Push every 8 hours PRN Nausea and/or Vomiting    Pertinent Labs: 12-27 @ 11:55: Na 134<L>, <H>, K+ 4.6,    Finger Sticks:  POCT Blood Glucose.: 220 mg/dL (12-27 @ 12:37)  POCT Blood Glucose.: 162 mg/dL (12-27 @ 08:38)  POCT Blood Glucose.: 213 mg/dL (12-26 @ 21:22)  POCT Blood Glucose.: 222 mg/dL (12-26 @ 17:37)      Skin per nursing documentation: intact  Edema: +1 b/l legs, +2 b/l feet    Estimated Needs:   [ x] no change since previous assessment  [ ] recalculated:     Previous Nutrition Diagnosis: increased nutrient needs  Nutrition Diagnosis is: ongoing, being addressed with continuing to obtain pt's food preferences     Recommend  1) Continue current consistent CHO diet with evening snack, no concentrated potassium and Glucerna twice daily  2) Continue to monitor Potassium and electrolytes  3) Food preferences obtained and honored. Continue to obtain food preferences  4) Continue to monitor weights, GI tolerance, edema and skin integrity  5) Reviewed with patient consistent CHO diet and foods high in potassium, pt displayed good understanding from previous dietitian      Monitoring and Evaluation:     Continue to monitor Nutritional intake, Tolerance to diet prescription, weights, labs, skin integrity    RD remains available upon request and will follow up per protocol  Neris Jimenez, Dietetic Intern  Pager number [phone]

## 2018-12-27 NOTE — PROGRESS NOTE ADULT - ASSESSMENT
58 F PMH COPD on home O2 3L, HTN, HLD, CAD s/p x6 stents, T2DM, pHTN, PVD, p/w progressive worsening dyspnea x 2 weeks now complicated by chronic progressive hypoxic respiratory failure.     1. Chronic progressive hypoxic respiratory failure  multifactorial in the setting of worsening COPD, CAD, chronic diastolic CHF, pulmonary HTN   c.o bl feet edema, but no dyspnea, likely secondary to steroids, can increase to lasix 40mg PO daily   c.o LUE edema, recent dopplers negative for UE dvt   echo with normal LV function, mild diastolic dysfunction, inadequate tricuspid regurg   cv stable no chest pain or sob   bipap PRN  continue dueonebs, inhaled steroids, singulair/theophylline/daliresp, pulm f/u   EKG reviewed, corrected QT WNL (aprox 462), on azithromycin    2. CAD s/p PCI  cv stable  continue asa, imdur    3. COPD   remains on bipap  continue Duoneb inhaled steroids, singulair/theophylline/daliresp, pulm f/u     4. hyponatremia/hyperkalemia  trend bmp  renal f/u     dvt ppx   dc planning per primary team; Transfer to [facility] Lung Transplant team vs inpatient Pulmonary Rehab

## 2018-12-27 NOTE — PROGRESS NOTE ADULT - ASSESSMENT
Plan:  1. Continue Duoneb/Budesonide nebulizer tx.  2. Continue Singulair, Daliresp, and Theophylline.  3. On Prednisone 20mg PO daily- would slowly taper- need to weigh risks and benefits including benefits for copd vs steroid myopathy, elevated glucose, etc.  perhaps leg discomfort will decrease with pt on decreasing dose  4. Continue nocturnal BiPAP (12/5 with 40% FiO2). She can also wear during daytime hours if necessary.  5. Titrate nasal O2 during daytime to keep O2 sat ~ 95%.  6. On Protonix and SQ Lovenox.   7. OOB ---> chair as tolerated.  8. Diuresis as per Cardiology. Continue to monitor electrolytes and renal function, physical exam  9.On Azithromycin for "anti-inflammatory" effects.  10.PT as able.  11.? Transfer to [facility] Lung Transplant team vs inpatient Pulmonary Rehab program- it would be very unusual for [place] to accept the patient directly from an inpatient facility.  She needs to optimize her overall status. Specialized Pulmonary rehab would be the best option for her and I believe [place] planning for this is appropriate.  pt is extremely deconditoned due to underlying respiratory disease, prolonged hospitalization    I have reviewed the situation with the patient at great length.  She is frustrated that she is not getting better.  She is concerned about extremity swelling- both bilateral UE and LE dopplers have been negative for clot.  I  have attempted to explain affects of COPD, decondtioning, med effects, etc.   I have explained need for "optimal" physical status in the transplant process.  I have reviewed benefits of pulmonary rehab with her.    Case also discussed with primary team  pt has outside pulmonologist- Dr Mathew- who should be involved in follow up  treatment plans    Julieth Gan MD  [phone]  Pulmonary Plan:  1. Continue Duoneb/Budesonide nebulizer tx.  2. Continue Singulair, Daliresp, and Theophylline.  3. On Prednisone 20mg PO daily- would slowly taper- need to weigh risks and benefits including benefits for copd vs steroid myopathy, elevated glucose, etc.  perhaps leg discomfort will decrease with pt on decreasing dose  4. Continue nocturnal BiPAP (12/5 with 40% FiO2). She can also wear during daytime hours if necessary.  5. Titrate nasal O2 during daytime to keep O2 sat ~ 95%.  6. On Protonix and SQ Lovenox.   7. OOB ---> chair as tolerated.  8. Diuresis as per Cardiology. Continue to monitor electrolytes and renal function, physical exam  9.On Azithromycin for "anti-inflammatory" effects.  10.PT as able.  11.? Transfer to [facility] Lung Transplant team vs inpatient Pulmonary Rehab program- it would be very unusual for [place] to accept the patient directly from an inpatient facility.  She needs to optimize her overall status. Specialized Pulmonary rehab would be the best option for her and I believe [place] planning for this is appropriate.  pt is extremely deconditoned due to underlying respiratory disease, prolonged hospitalization    I have reviewed the situation with the patient at great length.  She is frustrated that she is not getting better.  She is concerned about extremity swelling- both bilateral UE and LE dopplers have been negative for clot.  I  have attempted to explain affects of COPD, decondtioning, med effects, etc.   I have explained need for "optimal" physical status in the transplant process.  I have reviewed benefits of pulmonary rehab with her.    Case also discussed with primary team  pt has outside, primary  pulmonologist- Dr Mathew- who should be involved in follow up treatment plans.  She is away this week.  I have left a message with her office    Julieth Gan MD  [phone]  Pulmonary

## 2018-12-27 NOTE — PROGRESS NOTE ADULT - SUBJECTIVE AND OBJECTIVE BOX
CARDIOLOGY FOLLOW UP - Dr. Brunson    CC no cp or sob  c.o bl feet swelling and c.o LUE swelling       PHYSICAL EXAM:  T(C): 36.3 (12-27-18 @ 05:53), Max: 36.6 (12-26-18 @ 14:11)  HR: 91 (12-27-18 @ 10:14) (89 - 899)  BP: 132/71 (12-27-18 @ 05:53) (129/74 - 154/88)  RR: 18 (12-27-18 @ 05:53) (18 - 18)  SpO2: 100% (12-27-18 @ 10:14) (94% - 100%)  Wt(kg): --  I&O's Summary    26 Dec 2018 07:01  -  27 Dec 2018 07:00  --------------------------------------------------------  IN: 1440 mL / OUT: 1200 mL / NET: 240 mL    27 Dec 2018 07:01  -  27 Dec 2018 11:30  --------------------------------------------------------  IN: 0 mL / OUT: 1825 mL / NET: -1825 mL        Appearance: Normal	  Cardiovascular: Normal S1 S2,RRR, No JVD, No murmurs  Respiratory: Diminished   Gastrointestinal:  Soft, Non-tender, + BS	  Extremities: bl feet edema, LUE edema         MEDICATIONS  (STANDING):  ALBUTerol/ipratropium for Nebulization 3 milliLiter(s) Nebulizer <User Schedule>  artificial tears (preservative free) Ophthalmic Solution 1 Drop(s) Both EYES three times a day  aspirin enteric coated 81 milliGRAM(s) Oral daily  azithromycin   Tablet 250 milliGRAM(s) Oral <User Schedule>  Biotene Dry Mouth Oral Rinse 5 milliLiter(s) Swish and Spit two times a day  buDESOnide   0.5 milliGRAM(s) Respule 0.5 milliGRAM(s) Inhalation every 12 hours  cholecalciferol 2000 Unit(s) Oral daily  dextrose 5%. 1000 milliLiter(s) (50 mL/Hr) IV Continuous <Continuous>  dextrose 50% Injectable 12.5 Gram(s) IV Push once  dextrose 50% Injectable 25 Gram(s) IV Push once  dextrose 50% Injectable 25 Gram(s) IV Push once  docusate sodium 100 milliGRAM(s) Oral daily  enoxaparin Injectable 40 milliGRAM(s) SubCutaneous every 24 hours  furosemide    Tablet 20 milliGRAM(s) Oral daily  insulin glargine Injectable (LANTUS) 5 Unit(s) SubCutaneous at bedtime  insulin lispro (HumaLOG) corrective regimen sliding scale   SubCutaneous three times a day before meals  insulin lispro (HumaLOG) corrective regimen sliding scale   SubCutaneous at bedtime  insulin lispro Injectable (HumaLOG) 2 Unit(s) SubCutaneous with dinner  insulin lispro Injectable (HumaLOG) 3 Unit(s) SubCutaneous before lunch  insulin lispro Injectable (HumaLOG) 4 Unit(s) SubCutaneous before breakfast  isosorbide   mononitrate ER Tablet (IMDUR) 30 milliGRAM(s) Oral daily  melatonin 1 milliGRAM(s) Oral at bedtime  montelukast 10 milliGRAM(s) Oral daily  multivitamin 1 Tablet(s) Oral daily  nystatin    Suspension 297271 Unit(s) Oral four times a day  pantoprazole    Tablet 40 milliGRAM(s) Oral before breakfast  polyethylene glycol 3350 17 Gram(s) Oral daily  predniSONE   Tablet 20 milliGRAM(s) Oral daily  Roflumilast (Daliresp) 250 MICROGram(s) 250 MICROGram(s) Oral daily  senna 2 Tablet(s) Oral at bedtime  simethicone 80 milliGRAM(s) Chew once  theophylline ER Capsule 400 milliGRAM(s) Oral daily      TELEMETRY: 	    ECG:  	  RADIOLOGY:   DIAGNOSTIC TESTING:  [ ] Echocardiogram:  [ ]  Catheterization:  [ ] Stress Test:    OTHER: 	  < from: [facility] Duplex Upper Ext Vein Scan, Bilat (12.12.18 @ 17:36) >    FINDINGS:    The right internal jugular, subclavian, axillary, brachial, basilic and   cephalic veins are patent and compressible where applicable.     The left internal jugular, subclavian, axillary, brachial, basilic and   cephalic veins are patent and compressible where applicable.     Doppler examination shows normal spontaneous and phasic flow.    IMPRESSION:     No evidence of BILATERAL upper extremity deep venous thrombosis.        < end of copied text >    LABS:	 	                                10.2   10.84 )-----------( 105      ( 26 Dec 2018 09:52 )             31.8     12-26    136  |  88<L>  |  38<H>  ----------------------------<  206<H>  4.1   |  38<H>  |  1.10    Ca    9.9      26 Dec 2018 09:04

## 2018-12-27 NOTE — CONSULT NOTE ADULT - SUBJECTIVE AND OBJECTIVE BOX
Patient is a 58y old  Female who presents with a chief complaint of dyspnea (26 Dec 2018 15:53)    Admission HPI:  58 F PMH COPD on home O2 3L, HTN, HLD, CAD s/p x6 stents, T2DM, pHTN, PVD, p/w progressive worsening dyspnea x 2 weeks. Pt says she normally uses 3L NC atc at home. Infrequently leaves her house as of late. 2 wks ago, did leave her house a few times, once to go to pulm rehab, and felt much weaker than usual.  Has had increasing dyspnea and fatigue, worse with minimal exertion. Huffing/puffing for breath, sob with talking, showering, etc. +inc sputum volume production, pt says it is white in color, denies purulence. +inc O2 requirement now up to 4LNC at home during last 2 wks. +inc rescue inhaler use upto 7x/daily with minimal improvement.  Denies cough.  No significant inc in wheezing. +some runny nose but denies myalgias, sick contacts, sore throat; +flu shot this year.  Pt denies cp, denies f/c, denies n/v/d, denies orthopnea or significant increase in LE edema. Pt saw her pcp/pulm Dr. Mathew who started her on 10 d course of biaxin and prednisone 60 mg qd, which she has tapered down to 30 mg. Pt has completed a full 7 days of biaxin. Was told to go to ER last week for eval but tried to manage at home, now her sx have progressed.     Of note pt was prev evaluated for lung transplant at [place], but during testing had NSTEMI requiring PCI x 6 in 2016, and was then on Effient for 1 year and recommended repeat testing/rehab prior to subsequent evaluation.     VS: 97.9, 108, 131/69, 22, 96% 3LNC.  Labs: leukocytosis 12.1 with neutrophil predominance, rest of cbc unrevealing, cmp with relatively stable CKD2/3, hyperglycemia, vbg with compensated respiratory acidosis and normal lactate. CXR prelim no acute findings, no infiltrate. In ER pt received duonebs x 3, solumedrol 125 x1 prior to medicine team involvement. Pt endorses improvement in dyspnea since treatment in ER. (29 Nov 2018 21:28)    HPI: Patient course complicated by persistent COPD. Also has had AURORA, hyperkalemia, and elevated wbc count due to steroids. Being considered for transplant at [place].      REVIEW OF SYSTEMS: + chronic dyspnea, + weakness, No chest pain, nausea, vomiting or diarhea; other ROS neg     PAST MEDICAL & SURGICAL HISTORY  Hyperlipemia  Chronic sinusitis  Raynaud phenomenon  HTN (hypertension)  DM (diabetes mellitus)  Claustrophobia  COPD (chronic obstructive pulmonary disease)  S/P tonsillectomy    FUNCTIONAL HISTORY:   Lives with brother in house with no MARY.  PTA Independent with ADLs and ambulation with SC    FAMILY HISTORY   FH: MI (myocardial infarction) (Father)  FH: CABG (coronary artery bypass surgery) (Father)    RECENT LABS/IMAGING  CBC Full  -  ( 26 Dec 2018 09:52 )  WBC Count : 10.84 K/uL  Hemoglobin : 10.2 g/dL  Hematocrit : 31.8 %  Platelet Count - Automated : 105 K/uL  Mean Cell Volume : 88.8 fl  Mean Cell Hemoglobin : 28.5 pg  Mean Cell Hemoglobin Concentration : 32.1 gm/dL  Auto Neutrophil # : x  Auto Lymphocyte # : x  Auto Monocyte # : x  Auto Eosinophil # : x  Auto Basophil # : x  Auto Neutrophil % : x  Auto Lymphocyte % : x  Auto Monocyte % : x  Auto Eosinophil % : x  Auto Basophil % : x    12-26    136  |  88<L>  |  38<H>  ----------------------------<  206<H>  4.1   |  38<H>  |  1.10    Ca    9.9      26 Dec 2018 09:04  Mg     1.8     12-25      VITALS  T(C): 36.3 (12-27-18 @ 05:53), Max: 36.6 (12-26-18 @ 14:11)  HR: 89 (12-27-18 @ 05:53) (89 - 899)  BP: 132/71 (12-27-18 @ 05:53) (129/74 - 154/88)  RR: 18 (12-27-18 @ 05:53) (18 - 18)  SpO2: 100% (12-27-18 @ 05:53) (94% - 100%)  Wt(kg): --    ALLERGIES  No Known Allergies      MEDICATIONS   ALBUTerol/ipratropium for Nebulization 3 milliLiter(s) Nebulizer <User Schedule>  artificial tears (preservative free) Ophthalmic Solution 1 Drop(s) Both EYES three times a day  aspirin enteric coated 81 milliGRAM(s) Oral daily  azithromycin   Tablet 250 milliGRAM(s) Oral <User Schedule>  Biotene Dry Mouth Oral Rinse 5 milliLiter(s) Swish and Spit two times a day  bisacodyl Suppository 10 milliGRAM(s) Rectal daily PRN  buDESOnide   0.5 milliGRAM(s) Respule 0.5 milliGRAM(s) Inhalation every 12 hours  cholecalciferol 2000 Unit(s) Oral daily  dextrose 40% Gel 15 Gram(s) Oral once PRN  dextrose 5%. 1000 milliLiter(s) IV Continuous <Continuous>  dextrose 50% Injectable 12.5 Gram(s) IV Push once  dextrose 50% Injectable 25 Gram(s) IV Push once  dextrose 50% Injectable 25 Gram(s) IV Push once  docusate sodium 100 milliGRAM(s) Oral daily  enoxaparin Injectable 40 milliGRAM(s) SubCutaneous every 24 hours  furosemide    Tablet 20 milliGRAM(s) Oral daily  glucagon  Injectable 1 milliGRAM(s) IntraMuscular once PRN  insulin glargine Injectable (LANTUS) 5 Unit(s) SubCutaneous at bedtime  insulin lispro (HumaLOG) corrective regimen sliding scale   SubCutaneous three times a day before meals  insulin lispro (HumaLOG) corrective regimen sliding scale   SubCutaneous at bedtime  insulin lispro Injectable (HumaLOG) 2 Unit(s) SubCutaneous with dinner  insulin lispro Injectable (HumaLOG) 3 Unit(s) SubCutaneous before lunch  insulin lispro Injectable (HumaLOG) 4 Unit(s) SubCutaneous before breakfast  isosorbide   mononitrate ER Tablet (IMDUR) 30 milliGRAM(s) Oral daily  melatonin 1 milliGRAM(s) Oral at bedtime  montelukast 10 milliGRAM(s) Oral daily  multivitamin 1 Tablet(s) Oral daily  nystatin    Suspension 593076 Unit(s) Oral four times a day  ondansetron Injectable 4 milliGRAM(s) IV Push every 8 hours PRN  pantoprazole    Tablet 40 milliGRAM(s) Oral before breakfast  polyethylene glycol 3350 17 Gram(s) Oral daily  predniSONE   Tablet 20 milliGRAM(s) Oral daily  Roflumilast (Daliresp) 250 MICROGram(s) 250 MICROGram(s) Oral daily  senna 2 Tablet(s) Oral at bedtime  simethicone 80 milliGRAM(s) Chew once  sodium chloride 0.65% Nasal 1 Spray(s) Both Nostrils two times a day PRN  theophylline ER Capsule 400 milliGRAM(s) Oral daily      ----------------------------------------------------------------------------------------  PHYSICAL EXAM  Constitutional - NAD, Comfortable  HEENT - NCAT, EOMI  Neck - Supple, No limited ROM  Chest - CTA bilaterally, No wheeze, No rhonchi, No crackles  Cardiovascular - RRR, S1S2, No murmurs  Abdomen - BS+, Soft, NTND  Extremities - No C/C/E, No calf tenderness   Neurologic Exam -                    Cognitive - Awake, Alert, AAO to self, place, date, year, situation     Communication - Fluent, No dysarthria, no aphasia     Cranial Nerves - CN 2-12 intact     Motor - No focal deficits                       Sensory - Intact to LT     Reflexes - DTR Intact, No primitive reflexive     Balance - WNL Static  Psychiatric - Mood stable, Affect WNL      Impression:   57 yo with debility secondary to prolonged hospitalization with COPD exacerbation.      Plan:  PT- ROM, Bed Mob, Transfers, Amb w AD  OT- ADLs, energy conservation  Prec- Falls, Cardiac, Pulmonary  DVT Prophylaxis- Lovenox  Skin- Turn q2 h  Dispo- Patient is a 58y old  Female who presents with a chief complaint of dyspnea (26 Dec 2018 15:53)    Admission HPI:  58 F PMH COPD on home O2 3L, HTN, HLD, CAD s/p x6 stents, T2DM, pHTN, PVD, p/w progressive worsening dyspnea x 2 weeks. Pt says she normally uses 3L NC atc at home. Infrequently leaves her house as of late. 2 wks ago, did leave her house a few times, once to go to pulm rehab, and felt much weaker than usual.  Has had increasing dyspnea and fatigue, worse with minimal exertion. Huffing/puffing for breath, sob with talking, showering, etc. +inc sputum volume production, pt says it is white in color, denies purulence. +inc O2 requirement now up to 4LNC at home during last 2 wks. +inc rescue inhaler use upto 7x/daily with minimal improvement.  Denies cough.  No significant inc in wheezing. +some runny nose but denies myalgias, sick contacts, sore throat; +flu shot this year.  Pt denies cp, denies f/c, denies n/v/d, denies orthopnea or significant increase in LE edema. Pt saw her pcp/pulm Dr. Mathew who started her on 10 d course of biaxin and prednisone 60 mg qd, which she has tapered down to 30 mg. Pt has completed a full 7 days of biaxin. Was told to go to ER last week for eval but tried to manage at home, now her sx have progressed.     Of note pt was prev evaluated for lung transplant at [place], but during testing had NSTEMI requiring PCI x 6 in 2016, and was then on Effient for 1 year and recommended repeat testing/rehab prior to subsequent evaluation.     VS: 97.9, 108, 131/69, 22, 96% 3LNC.  Labs: leukocytosis 12.1 with neutrophil predominance, rest of cbc unrevealing, cmp with relatively stable CKD2/3, hyperglycemia, vbg with compensated respiratory acidosis and normal lactate. CXR prelim no acute findings, no infiltrate. In ER pt received duonebs x 3, solumedrol 125 x1 prior to medicine team involvement. Pt endorses improvement in dyspnea since treatment in ER. (29 Nov 2018 21:28)    HPI: Patient course complicated by persistent COPD. Also has had AURORA, hyperkalemia, and elevated wbc count due to steroids. Being considered for transplant at [place].      REVIEW OF SYSTEMS: + chronic dyspnea, + weakness, No chest pain, nausea, vomiting or diarhea; other ROS neg     PAST MEDICAL & SURGICAL HISTORY  Hyperlipemia  Chronic sinusitis  Raynaud phenomenon  HTN (hypertension)  DM (diabetes mellitus)  Claustrophobia  COPD (chronic obstructive pulmonary disease)  S/P tonsillectomy    FUNCTIONAL HISTORY:   Lives with brother in house with no MARY.  PTA Independent with ADLs and ambulation with SC    FAMILY HISTORY   FH: MI (myocardial infarction) (Father)  FH: CABG (coronary artery bypass surgery) (Father)    RECENT LABS/IMAGING  CBC Full  -  ( 26 Dec 2018 09:52 )  WBC Count : 10.84 K/uL  Hemoglobin : 10.2 g/dL  Hematocrit : 31.8 %  Platelet Count - Automated : 105 K/uL  Mean Cell Volume : 88.8 fl  Mean Cell Hemoglobin : 28.5 pg  Mean Cell Hemoglobin Concentration : 32.1 gm/dL  Auto Neutrophil # : x  Auto Lymphocyte # : x  Auto Monocyte # : x  Auto Eosinophil # : x  Auto Basophil # : x  Auto Neutrophil % : x  Auto Lymphocyte % : x  Auto Monocyte % : x  Auto Eosinophil % : x  Auto Basophil % : x    12-26    136  |  88<L>  |  38<H>  ----------------------------<  206<H>  4.1   |  38<H>  |  1.10    Ca    9.9      26 Dec 2018 09:04  Mg     1.8     12-25      VITALS  T(C): 36.3 (12-27-18 @ 05:53), Max: 36.6 (12-26-18 @ 14:11)  HR: 89 (12-27-18 @ 05:53) (89 - 899)  BP: 132/71 (12-27-18 @ 05:53) (129/74 - 154/88)  RR: 18 (12-27-18 @ 05:53) (18 - 18)  SpO2: 100% (12-27-18 @ 05:53) (94% - 100%)  Wt(kg): --    ALLERGIES  No Known Allergies      MEDICATIONS   ALBUTerol/ipratropium for Nebulization 3 milliLiter(s) Nebulizer <User Schedule>  artificial tears (preservative free) Ophthalmic Solution 1 Drop(s) Both EYES three times a day  aspirin enteric coated 81 milliGRAM(s) Oral daily  azithromycin   Tablet 250 milliGRAM(s) Oral <User Schedule>  Biotene Dry Mouth Oral Rinse 5 milliLiter(s) Swish and Spit two times a day  bisacodyl Suppository 10 milliGRAM(s) Rectal daily PRN  buDESOnide   0.5 milliGRAM(s) Respule 0.5 milliGRAM(s) Inhalation every 12 hours  cholecalciferol 2000 Unit(s) Oral daily  dextrose 40% Gel 15 Gram(s) Oral once PRN  dextrose 5%. 1000 milliLiter(s) IV Continuous <Continuous>  dextrose 50% Injectable 12.5 Gram(s) IV Push once  dextrose 50% Injectable 25 Gram(s) IV Push once  dextrose 50% Injectable 25 Gram(s) IV Push once  docusate sodium 100 milliGRAM(s) Oral daily  enoxaparin Injectable 40 milliGRAM(s) SubCutaneous every 24 hours  furosemide    Tablet 20 milliGRAM(s) Oral daily  glucagon  Injectable 1 milliGRAM(s) IntraMuscular once PRN  insulin glargine Injectable (LANTUS) 5 Unit(s) SubCutaneous at bedtime  insulin lispro (HumaLOG) corrective regimen sliding scale   SubCutaneous three times a day before meals  insulin lispro (HumaLOG) corrective regimen sliding scale   SubCutaneous at bedtime  insulin lispro Injectable (HumaLOG) 2 Unit(s) SubCutaneous with dinner  insulin lispro Injectable (HumaLOG) 3 Unit(s) SubCutaneous before lunch  insulin lispro Injectable (HumaLOG) 4 Unit(s) SubCutaneous before breakfast  isosorbide   mononitrate ER Tablet (IMDUR) 30 milliGRAM(s) Oral daily  melatonin 1 milliGRAM(s) Oral at bedtime  montelukast 10 milliGRAM(s) Oral daily  multivitamin 1 Tablet(s) Oral daily  nystatin    Suspension [id number] Unit(s) Oral four times a day  ondansetron Injectable 4 milliGRAM(s) IV Push every 8 hours PRN  pantoprazole    Tablet 40 milliGRAM(s) Oral before breakfast  polyethylene glycol 3350 17 Gram(s) Oral daily  predniSONE   Tablet 20 milliGRAM(s) Oral daily  Roflumilast (Daliresp) 250 MICROGram(s) 250 MICROGram(s) Oral daily  senna 2 Tablet(s) Oral at bedtime  simethicone 80 milliGRAM(s) Chew once  sodium chloride 0.65% Nasal 1 Spray(s) Both Nostrils two times a day PRN  theophylline ER Capsule 400 milliGRAM(s) Oral daily      ----------------------------------------------------------------------------------------  PHYSICAL EXAM  Constitutional - NAD, Comfortable  HEENT - NCAT, EOMI  Neck - Supple, No limited ROM  Chest - decreased breath sounds diffusely  Cardiovascular - RRR, S1S2, No murmurs  Abdomen - BS+, Soft, NTND  Extremities - 1+ edema, no calf pain  Neurologic Exam -                    Cognitive - Awake, Alert, AAO to self, place, date, year, situation     Communication - Fluent, No dysarthria, no aphasia     Motor 4/5 bl UEs; 3+/5 bl HFs, 4/5 bl KEs, ADFs, APFs    Impression:   57 yo with debility secondary to prolonged hospitalization with COPD exacerbation.      Plan:  PT- ROM, Bed Mob, Transfers, Amb w AD  OT- ADLs, energy conservation  Prec- Falls, Cardiac, Pulmonary  DVT Prophylaxis- Lovenox  Skin- Turn q2 h  Dispo- Acute Pulmonary Rehab- can tolerate 3h/d PT/OT/SLP and requires daily physician visits

## 2018-12-27 NOTE — PROGRESS NOTE ADULT - PROBLEM SELECTOR PLAN 8
Echo with  mild diastolic dysfunction, no significant changes from prior study in 2014.  lasix 40mg qd   cardiology follow up

## 2018-12-27 NOTE — PROGRESS NOTE ADULT - PROBLEM SELECTOR PLAN 3
A1C 7.2  -c/w Lantus 5 units QHS  -Humalog decreased to 4-3-2 units  w/meals   - Continue to monitor blood sugars.  - Endocrine following

## 2018-12-27 NOTE — PROGRESS NOTE ADULT - ASSESSMENT
57 y/o F w/h/o controlled T2DM on Metformin 500mg bid. Also h/o CAD and COPD now on prednisone 20mg daily. Fasting glucose improved but still having hyperglycemia during the daytime. Will slowly titrate given insulin sensitivity and previous hypo incidents. Consistently elevated pre-dinner time so will increase lunchtime humalog. No hypoglycemia or further changes to steroid regimen at this time. Awaiting pulmonary rehab. BG goal (100-180mg/dl).

## 2018-12-27 NOTE — PROGRESS NOTE ADULT - SUBJECTIVE AND OBJECTIVE BOX
Follow-up Pulm Progress Note    pt is generally frustrated regarding her debilitated state.  Legs are weak.  she is dyspneic with minimal exertion    Medications:  MEDICATIONS  (STANDING):  ALBUTerol/ipratropium for Nebulization 3 milliLiter(s) Nebulizer <User Schedule>  artificial tears (preservative free) Ophthalmic Solution 1 Drop(s) Both EYES three times a day  aspirin enteric coated 81 milliGRAM(s) Oral daily  azithromycin   Tablet 250 milliGRAM(s) Oral <User Schedule>  Biotene Dry Mouth Oral Rinse 5 milliLiter(s) Swish and Spit two times a day  buDESOnide   0.5 milliGRAM(s) Respule 0.5 milliGRAM(s) Inhalation every 12 hours  cholecalciferol 2000 Unit(s) Oral daily  dextrose 5%. 1000 milliLiter(s) (50 mL/Hr) IV Continuous <Continuous>  dextrose 50% Injectable 12.5 Gram(s) IV Push once  dextrose 50% Injectable 25 Gram(s) IV Push once  dextrose 50% Injectable 25 Gram(s) IV Push once  docusate sodium 100 milliGRAM(s) Oral daily  enoxaparin Injectable 40 milliGRAM(s) SubCutaneous every 24 hours  furosemide    Tablet 20 milliGRAM(s) Oral daily  insulin glargine Injectable (LANTUS) 5 Unit(s) SubCutaneous at bedtime  insulin lispro (HumaLOG) corrective regimen sliding scale   SubCutaneous three times a day before meals  insulin lispro (HumaLOG) corrective regimen sliding scale   SubCutaneous at bedtime  insulin lispro Injectable (HumaLOG) 2 Unit(s) SubCutaneous with dinner  insulin lispro Injectable (HumaLOG) 3 Unit(s) SubCutaneous before lunch  insulin lispro Injectable (HumaLOG) 4 Unit(s) SubCutaneous before breakfast  isosorbide   mononitrate ER Tablet (IMDUR) 30 milliGRAM(s) Oral daily  melatonin 1 milliGRAM(s) Oral at bedtime  montelukast 10 milliGRAM(s) Oral daily  multivitamin 1 Tablet(s) Oral daily  nystatin    Suspension [id number] Unit(s) Oral four times a day  pantoprazole    Tablet 40 milliGRAM(s) Oral before breakfast  polyethylene glycol 3350 17 Gram(s) Oral daily  predniSONE   Tablet 20 milliGRAM(s) Oral daily  Roflumilast (Daliresp) 250 MICROGram(s) 250 MICROGram(s) Oral daily  senna 2 Tablet(s) Oral at bedtime  simethicone 80 milliGRAM(s) Chew once  theophylline ER Capsule 400 milliGRAM(s) Oral daily    MEDICATIONS  (PRN):  bisacodyl Suppository 10 milliGRAM(s) Rectal daily PRN Constipation  dextrose 40% Gel 15 Gram(s) Oral once PRN Blood Glucose LESS THAN 70 milliGRAM(s)/deciliter  glucagon  Injectable 1 milliGRAM(s) IntraMuscular once PRN Glucose LESS THAN 70 milligrams/deciliter  ondansetron Injectable 4 milliGRAM(s) IV Push every 8 hours PRN Nausea and/or Vomiting  sodium chloride 0.65% Nasal 1 Spray(s) Both Nostrils two times a day PRN Nasal Congestion      Vent settings (if applicable)      Vital Signs Last 24 Hrs  T(C): 36.3 (27 Dec 2018 05:53), Max: 36.6 (26 Dec 2018 14:11)  T(F): 97.3 (27 Dec 2018 05:53), Max: 97.9 (26 Dec 2018 14:11)  HR: 91 (27 Dec 2018 10:14) (89 - 899)  BP: 132/71 (27 Dec 2018 05:53) (129/74 - 154/88)  BP(mean): --  RR: 18 (27 Dec 2018 05:53) (18 - 18)  SpO2: 100% (27 Dec 2018 10:14) (94% - 100%)          12-26 @ 07:01  -  12-27 @ 07:00  --------------------------------------------------------  IN: 1440 mL / OUT: 1200 mL / NET: 240 mL          LABS:                        10.2   10.84 )-----------( 105      ( 26 Dec 2018 09:52 )             31.8     12-26    136  |  88<L>  |  38<H>  ----------------------------<  206<H>  4.1   |  38<H>  |  1.10    Ca    9.9      26 Dec 2018 09:04  Mg     1.8     12-25            CAPILLARY BLOOD GLUCOSE      POCT Blood Glucose.: 162 mg/dL (27 Dec 2018 08:38)              CULTURES:        Physical Examination:  Awake and alert  HEENT: unremarkable  PULM: Clear to auscultation bilaterally, no significant sputum production  CVS: Regular rate and rhythm, no murmurs, rubs, or gallops  Abd:  soft, non tender  Extrem: No CC, + bilateral pedal edema    RADIOLOGY REVIEWED  CXR:    CT chest:

## 2018-12-28 ENCOUNTER — TRANSCRIPTION ENCOUNTER (OUTPATIENT)
Age: 58
End: 2018-12-28

## 2018-12-28 LAB
BUN SERPL-MCNC: 30 MG/DL — HIGH (ref 7–23)
CALCIUM SERPL-MCNC: 9.6 MG/DL — SIGNIFICANT CHANGE UP (ref 8.4–10.5)
CHLORIDE SERPL-SCNC: 87 MMOL/L — LOW (ref 96–108)
CO2 SERPL-SCNC: 41 MMOL/L — HIGH (ref 22–31)
CREAT SERPL-MCNC: 0.98 MG/DL — SIGNIFICANT CHANGE UP (ref 0.5–1.3)
GLUCOSE BLDC GLUCOMTR-MCNC: 148 MG/DL — HIGH (ref 70–99)
GLUCOSE BLDC GLUCOMTR-MCNC: 165 MG/DL — HIGH (ref 70–99)
GLUCOSE BLDC GLUCOMTR-MCNC: 188 MG/DL — HIGH (ref 70–99)
GLUCOSE BLDC GLUCOMTR-MCNC: 339 MG/DL — HIGH (ref 70–99)
GLUCOSE SERPL-MCNC: 142 MG/DL — HIGH (ref 70–99)
HCT VFR BLD CALC: 31.6 % — LOW (ref 34.5–45)
HGB BLD-MCNC: 10.7 G/DL — LOW (ref 11.5–15.5)
MCHC RBC-ENTMCNC: 29.9 PG — SIGNIFICANT CHANGE UP (ref 27–34)
MCHC RBC-ENTMCNC: 33.7 GM/DL — SIGNIFICANT CHANGE UP (ref 32–36)
MCV RBC AUTO: 88.7 FL — SIGNIFICANT CHANGE UP (ref 80–100)
PLATELET # BLD AUTO: 136 K/UL — LOW (ref 150–400)
POTASSIUM SERPL-MCNC: 4.6 MMOL/L — SIGNIFICANT CHANGE UP (ref 3.5–5.3)
POTASSIUM SERPL-SCNC: 4.6 MMOL/L — SIGNIFICANT CHANGE UP (ref 3.5–5.3)
RBC # BLD: 3.57 M/UL — LOW (ref 3.8–5.2)
RBC # FLD: 13.9 % — SIGNIFICANT CHANGE UP (ref 10.3–14.5)
SODIUM SERPL-SCNC: 138 MMOL/L — SIGNIFICANT CHANGE UP (ref 135–145)
WBC # BLD: 8.4 K/UL — SIGNIFICANT CHANGE UP (ref 3.8–10.5)
WBC # FLD AUTO: 8.4 K/UL — SIGNIFICANT CHANGE UP (ref 3.8–10.5)

## 2018-12-28 PROCEDURE — 99233 SBSQ HOSP IP/OBS HIGH 50: CPT

## 2018-12-28 PROCEDURE — 99232 SBSQ HOSP IP/OBS MODERATE 35: CPT

## 2018-12-28 RX ADMIN — ONDANSETRON 4 MILLIGRAM(S): 8 TABLET, FILM COATED ORAL at 14:04

## 2018-12-28 RX ADMIN — INSULIN GLARGINE 5 UNIT(S): 100 INJECTION, SOLUTION SUBCUTANEOUS at 22:43

## 2018-12-28 RX ADMIN — Medication 0.5 MILLIGRAM(S): at 09:00

## 2018-12-28 RX ADMIN — Medication 500000 UNIT(S): at 00:33

## 2018-12-28 RX ADMIN — Medication 1: at 18:21

## 2018-12-28 RX ADMIN — Medication 2000 UNIT(S): at 11:58

## 2018-12-28 RX ADMIN — Medication 1 DROP(S): at 08:40

## 2018-12-28 RX ADMIN — Medication 400 MILLIGRAM(S): at 08:40

## 2018-12-28 RX ADMIN — Medication 500000 UNIT(S): at 11:58

## 2018-12-28 RX ADMIN — Medication 4 UNIT(S): at 13:12

## 2018-12-28 RX ADMIN — Medication 2: at 22:44

## 2018-12-28 RX ADMIN — Medication 40 MILLIGRAM(S): at 08:57

## 2018-12-28 RX ADMIN — Medication 3 MILLILITER(S): at 18:28

## 2018-12-28 RX ADMIN — Medication 0.5 MILLIGRAM(S): at 18:21

## 2018-12-28 RX ADMIN — Medication 3 MILLILITER(S): at 22:44

## 2018-12-28 RX ADMIN — Medication 81 MILLIGRAM(S): at 11:58

## 2018-12-28 RX ADMIN — Medication 100 MILLIGRAM(S): at 11:58

## 2018-12-28 RX ADMIN — Medication 4 UNIT(S): at 08:41

## 2018-12-28 RX ADMIN — Medication 1 TABLET(S): at 11:58

## 2018-12-28 RX ADMIN — PANTOPRAZOLE SODIUM 40 MILLIGRAM(S): 20 TABLET, DELAYED RELEASE ORAL at 08:40

## 2018-12-28 RX ADMIN — ENOXAPARIN SODIUM 40 MILLIGRAM(S): 100 INJECTION SUBCUTANEOUS at 23:59

## 2018-12-28 RX ADMIN — Medication 1 DROP(S): at 23:59

## 2018-12-28 RX ADMIN — Medication 500000 UNIT(S): at 08:39

## 2018-12-28 RX ADMIN — SENNA PLUS 2 TABLET(S): 8.6 TABLET ORAL at 23:59

## 2018-12-28 RX ADMIN — ISOSORBIDE MONONITRATE 30 MILLIGRAM(S): 60 TABLET, EXTENDED RELEASE ORAL at 11:58

## 2018-12-28 RX ADMIN — Medication 20 MILLIGRAM(S): at 08:40

## 2018-12-28 RX ADMIN — Medication 3 MILLILITER(S): at 14:05

## 2018-12-28 RX ADMIN — Medication 1: at 13:12

## 2018-12-28 RX ADMIN — Medication 3 MILLILITER(S): at 09:00

## 2018-12-28 RX ADMIN — MONTELUKAST 10 MILLIGRAM(S): 4 TABLET, CHEWABLE ORAL at 11:58

## 2018-12-28 RX ADMIN — Medication 2 UNIT(S): at 18:21

## 2018-12-28 NOTE — DISCHARGE NOTE ADULT - MEDICATION SUMMARY - MEDICATIONS TO STOP TAKING
I will STOP taking the medications listed below when I get home from the hospital:    metformin 500 mg oral tablet  -- 1 tab(s) by mouth 2 times a day    DuoNeb 0.5 mg-2.5 mg/3 mL inhalation solution  -- 3 milliliter(s) inhaled 4 times a day    CoQ10 I will STOP taking the medications listed below when I get home from the hospital:    metformin 500 mg oral tablet  -- 1 tab(s) by mouth 2 times a day    Spiriva 18 mcg inhalation capsule  -- 1 cap(s) inhaled once a day    DuoNeb 0.5 mg-2.5 mg/3 mL inhalation solution  -- 3 milliliter(s) inhaled 4 times a day    CoQ10

## 2018-12-28 NOTE — PROGRESS NOTE ADULT - PROBLEM SELECTOR PLAN 1
-test BG AC/HS  -c/w Lantus 5 units QHS  -c/w Humalog 4-4-2 w/meals for now. Will adjust as needed  -c/w Humalog low correction scale AC and Low HS scale  -Please notify endocrine when steroids further tapered so we can adjust the does of insulin!!!  discharge plan: restart home Metformin if off steroids  -Plan discussed with pt/team. If discharge to rehab on same steroid doses will continue basal/bolus therapy. Doses TBD  Contact info: [phone] (24/7). pager [phone]

## 2018-12-28 NOTE — DISCHARGE NOTE ADULT - ADDITIONAL INSTRUCTIONS
Pulmonologist - Dr. Fair - reached out to the [facility] transplant team. The patient has been accepted to [facility] acute rehab where they will be able to evaluate the patient for possible lung transplantation and perform appropriate testing as needed. Pulmonologist - Dr. Fair - reached out to the [facility] transplant team. The patient has been accepted to [facility] acute rehab where they will be able to evaluate the patient for possible lung transplantation and perform appropriate testing as needed.  Patient on insulin both Humalog premeal and Lantus while on steroids - Restart Metformin if off steroids

## 2018-12-28 NOTE — DISCHARGE NOTE ADULT - HOSPITAL COURSE
58-year-old woman with PMH of COPD on home O2 3L, HTN, HLD, CAD s/p 6 stents, Type 2 DM, PVD, p/w progressive worsening dyspnea x 2 weeks c/w COPD exacerbation. 57 yo F PMH COPD, chronic hypoxic resp failure on home O2 3L, HTN, HLD, CAD s/p 6 stents, dCHF, T2DM, PVD, p/w progressive worsening dyspnea x 2 weeks a/w COPD exacerbation, acute on chronic hypoxic/hypercarbic respiratory failure. With hospital  course c/b new onset afib RVR s/p successful CV, and b/l feet edema.  COPD exacerbation. Continue Prednisone taper (slow taper) per pulm. Start Prednisone 5mg daily x 5 days today  -Continue Pulmicort & Duoneb nebs, Theophylline, Singulair. Continue bipap at night. Pt on home O2 3L.   -Plan for discharge to acute pulmonary rehab at [facility] pending insurance approval. Pulmonologist, Dr. Alfaro and  on board.   A fib with RVR resolved s/p successful CV and Amiodarone. Continue Cardizem 240mg CR po qd and  Eliquis  Pulmonologist - Dr. Fair - reached out to the [facility] transplant team. The patient has been accepted to [place] acute rehab where they will be able to evaluate the patient for possible lung transplantation and perform appropriate testing as needed. 57 yo F PMH COPD, chronic hypoxic resp failure on home O2 3L, HTN, HLD, CAD s/p 6 stents, dCHF, T2DM, PVD, p/w progressive worsening dyspnea x 2 weeks a/w COPD exacerbation, acute on chronic hypoxic/hypercarbic respiratory failure. With hospital  course c/b new onset afib RVR s/p successful CV, and b/l feet edema.  COPD exacerbation. Continue Prednisone taper (slow taper) per pulm. Start Prednisone 5mg daily x 5 days today  -Continue Pulmicort & Duoneb nebs, Theophylline, Singulair. Continue bipap at night. Pt on home O2 3L.   -Plan for discharge to acute pulmonary rehab at [facility] pending insurance approval. Pulmonologist, Dr. Alfaro and  on board.   A fib with RVR requiring RRT called 1/6/19 s/p multiple IVP of diltiazem, metoprolol and  amiodarone bolus 150mg IVP x 1, cardizem infusion. TTE with normal LV systolic function in Dec 2018. S/P successful DCCV 1/7 remains in SR.  Pulmonologist - Dr. Fair - reached out to the [facility] transplant team. The patient has been accepted to [facility] acute rehab where they will be able to evaluate the patient for possible lung transplantation and perform appropriate testing as needed. 57 yo F PMH COPD, chronic hypoxic resp failure on home O2 3L, HTN, HLD, CAD s/p 6 stents, dCHF, T2DM, PVD, p/w progressive worsening dyspnea x 2 weeks a/w COPD exacerbation, acute on chronic hypoxic/hypercarbic respiratory failure. With hospital  course c/b new onset afib RVR s/p successful CV, and b/l feet edema.  COPD exacerbation. Continue Prednisone taper (slow taper) per pulm. Start Prednisone 5mg daily x 5 days today  -Continue Pulmicort & Duoneb nebs, Theophylline, Singulair. Continue bipap at night. Pt on home O2 3L.   -Plan for discharge to acute pulmonary rehab at [facility] pending insurance approval. Pulmonologist, Dr. Alfaro and  on board.   A fib with RVR requiring RRT called 1/6/19 s/p multiple IVP of diltiazem, metoprolol and  amiodarone bolus 150mg IVP x 1 and PO amio load, cardizem infusion. TTE with normal LV systolic function in Dec 2018. S/P successful DCCV 1/7 remains in SR.  Pulmonologist - Dr. Fair - reached out to the [facility] transplant team. The patient has been accepted to [place] acute rehab where they will be able to evaluate the patient for possible lung transplantation and perform appropriate testing as needed. 59 yo F PMH COPD, chronic hypoxic resp failure on home O2 3L, HTN, HLD, CAD s/p 6 stents, dCHF, T2DM, PVD, p/w progressive worsening dyspnea x 2 weeks a/w COPD exacerbation, acute on chronic hypoxic/hypercarbic respiratory failure. With hospital  course c/b new onset afib RVR s/p successful CV, and b/l feet edema.  COPD exacerbation. Continue Prednisone taper (slow taper) per pulm. Start Prednisone 5mg daily x 5 days today  -Continue Pulmicort & Duoneb nebs, Theophylline, Singulair. Continue bipap at night. Pt on home O2 3L.   -Plan for discharge to acute pulmonary rehab at [facility] pending insurance approval. Pulmonologist, Dr. Alfaro and  on board.   A fib with RVR requiring RRT called 1/6/19 s/p multiple IVP of diltiazem, metoprolol and  amiodarone bolus 150mg IVP x 1 and PO amio load, cardizem infusion. TTE with normal LV systolic function in Dec 2018. S/P successful DCCV 1/7 remains in SR.  Patient on insulin both Humalog premeal and Lantus while on steroids - Restart Metformin if off steroids  Pulmonologist - Dr. Fair - reached out to the [facility] transplant team. The patient has been accepted to [place] acute rehab where they will be able to evaluate the patient for possible lung transplantation and perform appropriate testing as needed. 59 yo F PMH COPD, chronic hypoxic resp failure on home O2 3L, HTN, HLD, CAD s/p 6 stents, dCHF, T2DM, PVD, p/w progressive worsening dyspnea x 2 weeks a/w COPD exacerbation, acute on chronic hypoxic/hypercarbic respiratory failure which required treatment with multiple inhalers along with DuoNeb treatment as well as BIPAP. Pt was successfully weened off of Bipap during the daytime and use only during at night. Pt was also started on a high dose steroid taper which she is currently at the end of her taper (Prednisone 5mg daily x 5 days, currently on Day 2/5). Her hospital course was complicated by new onset A fib with RVR for which she initially required IV Cardizem and Amiodarone. She eventually had successful cardioversion and discontinued Amiodarone due to its pulmonary adverse effect profile. Pt currently is rate controlled on Cardizem PO.   Due to pts extended Steroid use, pt developed uncontrolled hyperglycemia for which Endocrinology recommendations were followed. Pt was taking Metformin for her DM II as outpatient, however, she was switched to Lantus and premeal during hospital course. Because she is being discharged with Prednisone, recommendations for continuing Lantus 8QHS and premeal 5-4-3units + SSI as outpatient. Advised to titrate insulin requirements depending on fingerstick and SSI usage.   Throughout hospital course, pt was followed by Pulomologist, Dr. Fair, who was able to admit pt to Acute pulmonary rehab at [facility]. Pt will eventually undergo evaluation for transplant.      Problem/Plan - 1:  ·  Problem: Atrial fibrillation.  Plan: -A fib with RVR resolved s/p successful CV and Amiodarone  -Continue Cardizem 240mg CR po qd  -Continue Eliquis       Problem/Plan - 2:  ·  Problem: COPD exacerbation.  Plan: -Chronic  -Continue Prednisone 5mg daily x 5 days (End date 1/18/19)  -Continue Pulmicort & Duoneb nebs, Theophylline, Singulair  -Continue bipap at night. Patient does NOT require BIPAP.  -Plan for discharge to acute pulmonary rehab at [facility] today.      Problem/Plan - 3:  ·  Problem: Edema, unspecified type.  Plan: -Improving  -Continue supportive care with legs elevation  -Continue Lasix PO.      Problem/Plan - 4:  ·  Problem: Acute on chronic respiratory failure with hypoxia and hypercapnia.  Plan: -on home O2 3L.   -Continue COPD management as above.  -Continue on BIPAP bedtime  -Plan for discharge to acute pulmonary rehab at [facility] today. Pt pending lung transplant eval w/ [facility].      Problem/Plan - 5:  ·  Problem: Type 2 diabetes mellitus with hyperglycemia, without long-term current use of insulin.  Plan: -A1C 7.2  -c/w Lantus 8 units QHS  -c/w Humalog  5-4-3 w/meals   -Because pt is being discharged home with Prednisone, will continue Lantus and Premeal coverage as outpatient. Recommendations to titrate requirements based on Fingerstick and SSI requirements.      Problem/Plan - 6:  Problem: Heart failure. Plan: -Chronic   -Echo with mild diastolic dysfunction, no significant changes from prior study in 2014.  -Continue Lasix.     Problem/Plan - 7:  ·  Problem: Essential hypertension.  Plan: -Chronic  -Continue Imdur, c/w diltiazem as above.      Problem/Plan - 8:  ·  Problem: Prophylactic measure.  Plan: dvt ppx: on apixaban.

## 2018-12-28 NOTE — PROGRESS NOTE ADULT - ASSESSMENT
58 F PMH COPD on home O2 3L, HTN, HLD, CAD s/p x6 stents, T2DM, pHTN, PVD, p/w progressive worsening dyspnea x 2 weeks now complicated by chronic progressive hypoxic respiratory failure.     1. Chronic progressive hypoxic respiratory failure  multifactorial in the setting of worsening COPD, CAD, chronic diastolic CHF, pulmonary HTN   c.o bl feet edema, but no dyspnea, likely secondary to steroids, continue  lasix 40mg PO daily   c.o LUE edema, recent dopplers negative for UE dvt   pending repeat UE/ LE dopplers   echo with normal LV function, mild diastolic dysfunction, inadequate tricuspid regurg   cv stable no chest pain or sob   bipap PRN  continue dueonebs, inhaled steroids, singulair/theophylline/daliresp, pulm f/u   EKG reviewed, corrected QT WNL (aprox 462), on azithromycin    2. CAD s/p PCI  cv stable  continue asa, imdur    3. COPD   remains on bipap  continue Duoneb inhaled steroids, singulair/theophylline/daliresp, pulm f/u     4. hyponatremia/hyperkalemia  trend bmp, med f/u  dvt ppx   dc planning per primary team; Transfer to [facility] Lung Transplant team vs inpatient Pulmonary Rehab

## 2018-12-28 NOTE — PROGRESS NOTE ADULT - SUBJECTIVE AND OBJECTIVE BOX
CARDIOLOGY FOLLOW UP - Dr. Brunson    CC no cp or sob  c.o UE swelling and bl feet swelling      PHYSICAL EXAM:  T(C): 36.4 (12-28-18 @ 13:51), Max: 36.8 (12-27-18 @ 22:13)  HR: 90 (12-28-18 @ 13:51) (87 - 101)  BP: 128/79 (12-28-18 @ 13:51) (128/78 - 135/79)  RR: 18 (12-28-18 @ 13:51) (18 - 18)  SpO2: 95% (12-28-18 @ 13:51) (94% - 100%)  Wt(kg): --  I&O's Summary    27 Dec 2018 07:01  -  28 Dec 2018 07:00  --------------------------------------------------------  IN: 1160 mL / OUT: 3375 mL / NET: -2215 mL    28 Dec 2018 07:01  -  28 Dec 2018 15:50  --------------------------------------------------------  IN: 120 mL / OUT: 300 mL / NET: -180 mL        Appearance: Normal	  Cardiovascular: Normal S1 S2,RRR, No JVD, No murmurs  Respiratory: diminished   Gastrointestinal:  Soft, Non-tender, + BS	  Extremities: bl feet edema, left UE edema        MEDICATIONS  (STANDING):  ALBUTerol/ipratropium for Nebulization 3 milliLiter(s) Nebulizer <User Schedule>  artificial tears (preservative free) Ophthalmic Solution 1 Drop(s) Both EYES three times a day  aspirin enteric coated 81 milliGRAM(s) Oral daily  azithromycin   Tablet 250 milliGRAM(s) Oral <User Schedule>  Biotene Dry Mouth Oral Rinse 5 milliLiter(s) Swish and Spit two times a day  buDESOnide   0.5 milliGRAM(s) Respule 0.5 milliGRAM(s) Inhalation every 12 hours  cholecalciferol 2000 Unit(s) Oral daily  dextrose 5%. 1000 milliLiter(s) (50 mL/Hr) IV Continuous <Continuous>  dextrose 50% Injectable 12.5 Gram(s) IV Push once  dextrose 50% Injectable 25 Gram(s) IV Push once  dextrose 50% Injectable 25 Gram(s) IV Push once  docusate sodium 100 milliGRAM(s) Oral daily  enoxaparin Injectable 40 milliGRAM(s) SubCutaneous every 24 hours  furosemide    Tablet 40 milliGRAM(s) Oral daily  insulin glargine Injectable (LANTUS) 5 Unit(s) SubCutaneous at bedtime  insulin lispro (HumaLOG) corrective regimen sliding scale   SubCutaneous three times a day before meals  insulin lispro (HumaLOG) corrective regimen sliding scale   SubCutaneous at bedtime  insulin lispro Injectable (HumaLOG) 2 Unit(s) SubCutaneous with dinner  insulin lispro Injectable (HumaLOG) 4 Unit(s) SubCutaneous before breakfast  insulin lispro Injectable (HumaLOG) 4 Unit(s) SubCutaneous before lunch  isosorbide   mononitrate ER Tablet (IMDUR) 30 milliGRAM(s) Oral daily  melatonin 1 milliGRAM(s) Oral at bedtime  montelukast 10 milliGRAM(s) Oral daily  multivitamin 1 Tablet(s) Oral daily  nystatin    Suspension 409786 Unit(s) Oral four times a day  pantoprazole    Tablet 40 milliGRAM(s) Oral before breakfast  polyethylene glycol 3350 17 Gram(s) Oral daily  predniSONE   Tablet 20 milliGRAM(s) Oral daily  Roflumilast (Daliresp) 250 MICROGram(s) 250 MICROGram(s) Oral daily  senna 2 Tablet(s) Oral at bedtime  simethicone 80 milliGRAM(s) Chew once  theophylline ER Capsule 400 milliGRAM(s) Oral daily      TELEMETRY: 	    ECG:  	  RADIOLOGY:   DIAGNOSTIC TESTING:  [ ] Echocardiogram:  [ ]  Catheterization:  [ ] Stress Test:    OTHER: 	    LABS:	 	                                10.7   8.4   )-----------( 136      ( 28 Dec 2018 14:29 )             31.6     12-27    134<L>  |  88<L>  |  38<H>  ----------------------------<  252<H>  4.6   |  36<H>  |  0.99    Ca    9.7      27 Dec 2018 11:55

## 2018-12-28 NOTE — DISCHARGE NOTE ADULT - PLAN OF CARE
Free from s/s of resp distress Avoid taking (NSAIDs) - (ex: Ibuprofen, Advil, Celebrex, Naprosyn)  Avoid taking any nephrotoxic agents (can harm kidneys) - Intravenous contrast for diagnostic testing, combination cold medications.  Have all medications adjusted for your renal function by your Health Care Provider.  Blood pressure control is important.  Take all medication as prescribed. Take meds as directed   Keep hydrated Call your Health Care provider upon arrival home to make a follow up appointment within one week.  Take all inhalers as prescribed by your Health Care Provider.  Take steroids as prescribed by your Health Care Provider.  If your cough increases infrequency and severity and/or you have shortness of breath or increased shortness of breath call your Health Care Provider.  If you develop fever, chills, night sweats, malaise, and/or change in mental status call your Health care Provider.  Nutrition is very important.  Eat small frequent meals.  Increase your activity as tolerated.  Do not stay in bed all day Due to COPD exacerbation  Conitnue home O2 3L NC.   -Continue COPD management as above.  -Continue on BIPAP bedtime  -Plan for discharge to acute pulmonary rehab at [facility] -Continue Prednisone taper (slow taper) per pulm. Start Prednisone 5mg daily x 5 days started on 1/14/19  -Continue Pulmicort & Duoneb nebs, Theophylline, Singulair  -Continue bipap at night. Patient does NOT require BIPAP.  -Plan for discharge to acute pulmonary rehab at [facility] A fib with RVR resolved s/p successful CV and Amiodarone  -Continue Cardizem 240mg CR po qd  You were started on blood thinners - Eliquis to prevent possible clot and stroke complications.   Monitor for any signs of bleeding and avoid injury while on this medication  If any bleeding persistent and symptomatic stop the medication and notify your doctor immediately.  It helps if you control your blood pressure, not drink more than 1-2 alcohol drinks per day, cut down on caffeine, getting treatment for over active thyroid gland, and get regular exercise  Call your doctor if you feel your heart racing or beating unusually, chest tightness or pain, lightheaded, faint, shortness of breath especially with exercise  It is important to take your heart medication as prescribed A1C 7.2  -c/w Lantus 10 units QHS  -c/w Humalog  4-4-3 w/meals   -Continue to monitor blood sugars. -Continue Imdur, c/w diltiazem as above. -Echo with mild diastolic dysfunction, no significant changes from prior study in 2014.  -Continue Lasix. A1C 7.2  -c/w Lantus QHS, premeal Humalog and sliding scale while on steroids  -Continue to monitor blood sugars  Patient on insulin both Humalog premeal and Lantus while on steroids - Restart Metformin if off steroids

## 2018-12-28 NOTE — PROGRESS NOTE ADULT - SUBJECTIVE AND OBJECTIVE BOX
Follow-up Pulm Progress Note    pt is generally dissatisfied with current situation.  She does not wish to discuss situation  and would like people to stop asking "how she is doing"  Unchanged sob with minimal exertion.  no increasing cough, wheeze, chest pain, sputum or fevers.    Medications:  MEDICATIONS  (STANDING):  ALBUTerol/ipratropium for Nebulization 3 milliLiter(s) Nebulizer <User Schedule>  artificial tears (preservative free) Ophthalmic Solution 1 Drop(s) Both EYES three times a day  aspirin enteric coated 81 milliGRAM(s) Oral daily  azithromycin   Tablet 250 milliGRAM(s) Oral <User Schedule>  Biotene Dry Mouth Oral Rinse 5 milliLiter(s) Swish and Spit two times a day  buDESOnide   0.5 milliGRAM(s) Respule 0.5 milliGRAM(s) Inhalation every 12 hours  cholecalciferol 2000 Unit(s) Oral daily  dextrose 5%. 1000 milliLiter(s) (50 mL/Hr) IV Continuous <Continuous>  dextrose 50% Injectable 12.5 Gram(s) IV Push once  dextrose 50% Injectable 25 Gram(s) IV Push once  dextrose 50% Injectable 25 Gram(s) IV Push once  docusate sodium 100 milliGRAM(s) Oral daily  enoxaparin Injectable 40 milliGRAM(s) SubCutaneous every 24 hours  furosemide    Tablet 40 milliGRAM(s) Oral daily  insulin glargine Injectable (LANTUS) 5 Unit(s) SubCutaneous at bedtime  insulin lispro (HumaLOG) corrective regimen sliding scale   SubCutaneous three times a day before meals  insulin lispro (HumaLOG) corrective regimen sliding scale   SubCutaneous at bedtime  insulin lispro Injectable (HumaLOG) 2 Unit(s) SubCutaneous with dinner  insulin lispro Injectable (HumaLOG) 4 Unit(s) SubCutaneous before breakfast  insulin lispro Injectable (HumaLOG) 4 Unit(s) SubCutaneous before lunch  isosorbide   mononitrate ER Tablet (IMDUR) 30 milliGRAM(s) Oral daily  melatonin 1 milliGRAM(s) Oral at bedtime  montelukast 10 milliGRAM(s) Oral daily  multivitamin 1 Tablet(s) Oral daily  nystatin    Suspension [id number] Unit(s) Oral four times a day  pantoprazole    Tablet 40 milliGRAM(s) Oral before breakfast  polyethylene glycol 3350 17 Gram(s) Oral daily  predniSONE   Tablet 20 milliGRAM(s) Oral daily  Roflumilast (Daliresp) 250 MICROGram(s) 250 MICROGram(s) Oral daily  senna 2 Tablet(s) Oral at bedtime  simethicone 80 milliGRAM(s) Chew once  theophylline ER Capsule 400 milliGRAM(s) Oral daily    MEDICATIONS  (PRN):  bisacodyl Suppository 10 milliGRAM(s) Rectal daily PRN Constipation  dextrose 40% Gel 15 Gram(s) Oral once PRN Blood Glucose LESS THAN 70 milliGRAM(s)/deciliter  glucagon  Injectable 1 milliGRAM(s) IntraMuscular once PRN Glucose LESS THAN 70 milligrams/deciliter  ondansetron Injectable 4 milliGRAM(s) IV Push every 8 hours PRN Nausea and/or Vomiting  sodium chloride 0.65% Nasal 1 Spray(s) Both Nostrils two times a day PRN Nasal Congestion      Vent settings (if applicable)      Vital Signs Last 24 Hrs  T(C): 36.4 (28 Dec 2018 06:30), Max: 36.8 (27 Dec 2018 22:13)  T(F): 97.6 (28 Dec 2018 06:30), Max: 98.2 (27 Dec 2018 22:13)  HR: 89 (28 Dec 2018 06:54) (87 - 101)  BP: 128/78 (28 Dec 2018 06:30) (128/78 - 135/79)  BP(mean): --  RR: 18 (28 Dec 2018 06:30) (18 - 18)  SpO2: 94% (28 Dec 2018 06:54) (94% - 100%)          12-27 @ 07:01  -  12-28 @ 07:00  --------------------------------------------------------  IN: 1160 mL / OUT: 3375 mL / NET: -2215 mL          LABS:                        11.1   10.4  )-----------( 122      ( 27 Dec 2018 12:04 )             32.8     12-27    134<L>  |  88<L>  |  38<H>  ----------------------------<  252<H>  4.6   |  36<H>  |  0.99    Ca    9.7      27 Dec 2018 11:55            CAPILLARY BLOOD GLUCOSE      POCT Blood Glucose.: 165 mg/dL (28 Dec 2018 13:02)              CULTURES:        Physical Examination:  Awake and alert, generally comfortable at rest  HEENT: unremarkable  PULM: Clear to auscultation bilaterally, no significant sputum production  CVS: Regular rate and rhythm, no murmurs, rubs, or gallops  Abd:  soft, non tender  Extrem: No CC, + edema    RADIOLOGY REVIEWED  CXR:    CT chest:

## 2018-12-28 NOTE — CHART NOTE - NSCHARTNOTEFT_GEN_A_CORE
follow up- Informed by RN pt refused to have the doppler procedure done when staff came to do the procedure- refused to put back to bed for doppler procedure;  discussed the importance of procedure to r/o DVt; informed department to reschedule;   Nafisa Hernandez(NP)  [address], [phone]

## 2018-12-28 NOTE — PROGRESS NOTE ADULT - ASSESSMENT
Plan:  1. Continue Duoneb/Budesonide nebulizer tx.  2. Continue Singulair, Daliresp, and Theophylline.  3. On Prednisone 20mg PO daily- would slowly taper- need to weigh risks and benefits including benefits for copd vs steroid myopathy, elevated glucose, etc.  perhaps leg discomfort will decrease with pt on decreasing dose  4. Continue nocturnal BiPAP (12/5 with 40% FiO2). She can also wear during daytime hours if necessary.  5. Titrate nasal O2 during daytime to keep O2 sat ~ 95%.  6. On Protonix and SQ Lovenox.   7. OOB ---> chair as tolerated.  8. Diuresis as per Cardiology. Continue to monitor electrolytes and renal function, physical exam  9.On Azithromycin for "anti-inflammatory" effects.  10.PT as able- Recommendations as in chart   11.? Transfer to [facility] Lung Transplant team vs inpatient Pulmonary Rehab program- it would be very unusual for [place] to accept the patient directly from an inpatient facility.  She needs to optimize her overall status. Specialized Pulmonary rehab would be the best option for her and I believe dc planning for this is appropriate.  pt is extremely deconditoned due to underlying respiratory disease, prolonged hospitalization    I have reviewed the situation with the patient at great length.  She is frustrated that she is not getting better and generally angry regarding her situation and lack of rapid improvement in her status.  I have attempted to explain situation to the patient again with limited success.  She is ill, but stable at present.  We will not see over the weekend.   Please call if we can be of any assistance. continue dc planning.        pt has outside, primary  pulmonologist- Dr Mathew who will be involved with outpt care    Julieth Gan MD  [phone]  Pulmonary

## 2018-12-28 NOTE — DISCHARGE NOTE ADULT - MEDICATION SUMMARY - MEDICATIONS TO TAKE
I will START or STAY ON the medications listed below when I get home from the hospital:    budesonide 0.5 mg/2 mL inhalation suspension  -- 0.5 milligram(s) inhaled 2 times a day  -- Indication: For COPD exacerbation    predniSONE 5 mg oral tablet  -- 1 tab(s) by mouth once a day for 3 more days  -- Indication: For COPD exacerbation    aspirin 81 mg oral tablet, chewable  -- 1 tab(s) by mouth once a day  -- Indication: For Cardiac protection    Benicar 20 mg oral tablet  -- 1 tab(s) by mouth once a day  -- Indication: For Blood pressure control    isosorbide mononitrate 30 mg oral tablet, extended release  -- 1 tab(s) by mouth once a day (in the morning)  -- Indication: For CAD    dilTIAZem 240 mg/24 hours oral capsule, extended release  -- 1 cap(s) by mouth once a day  -- Indication: For Atrial fibrillation    apixaban 5 mg oral tablet  -- 1 tab(s) by mouth every 12 hours  -- Indication: For Anticoagulation for Afib    HumaLOG 100 units/mL subcutaneous solution  -- Sliding scale  1 Unit(s) if Glucose 151 - 200  2 Unit(s) if Glucose 201 - 250  3 Unit(s) if Glucose 251 - 300  4 Unit(s) if Glucose 301 - 350  5 Unit(s) if Glucose 351 - 400  6 Unit(s) if Glucose Greater Than 400  -- Indication: For Blood sugar control    HumaLOG 100 units/mL subcutaneous solution  -- Sliding scale  subcutaneous once a day (at bedtime)  0 Unit(s) if Glucose 61 - 250  1 Unit(s) if Glucose 251 - 300  2 Unit(s) if Glucose 301 - 350  3 Unit(s) if Glucose 351 - 400  4 Unit(s) if Glucose Greater Than 400  -- Indication: For Blood sugar control    HumaLOG 100 units/mL subcutaneous solution  -- 4 units subcutaneous once a day - with LUNCH  -- Indication: For Blood sugar control    HumaLOG 100 units/mL subcutaneous solution  -- 3 unit(s) subcutaneous once a day - with DINNER  -- Indication: For Blood sugar control    HumaLOG 100 units/mL subcutaneous solution  -- 5 units subcutaneous once a day with BREAKFAST  -- Indication: For Blood sugar control    Lantus 100 units/mL subcutaneous solution  -- 8 unit(s) subcutaneous once a day (at bedtime)  -- Indication: For Blood sugar control    Crestor 10 mg oral tablet  -- 1 tab(s) by mouth once a day (at bedtime)  -- Indication: For Hyperlipidemia    Spiriva 18 mcg inhalation capsule  -- 1 cap(s) inhaled once a day  -- Indication: For COPD     Ventolin HFA 90 mcg/inh inhalation aerosol  -- 2 puff(s) inhaled 4 times a day, As Needed  -- Indication: For COPD    theophylline 24 hr extended release  -- 1 tab(s) by mouth once a day, (400mg tab)  -- Indication: For COPD    ipratropium 500 mcg/2.5 mL inhalation solution  -- 2.5 milliliter(s) inhaled every 6 hours  -- Indication: For COPD    levalbuterol 0.63 mg/3 mL inhalation solution  -- 3 milliliter(s) inhaled every 6 hours  -- Indication: For COPD exacerbation    hydrocortisone 1% topical cream  -- 1 application on skin once a day - left foot, then wrap with kerlix dressing  -- Indication: For Topical cream    calamine topical lotion  -- 1 application on skin 2 times a day - to b/l feet where there is pain  -- Indication: For Topical cream    bacitracin 500 units/g topical ointment  -- 1 application on skin once a day - wound  -- Indication: For wound    furosemide 40 mg oral tablet  -- 1 tab(s) by mouth once a day  -- Indication: For Diuretic    senna oral tablet  -- 2 tab(s) by mouth once a day (at bedtime)  -- Indication: For COnstipation    bisacodyl 10 mg rectal suppository  -- 1 suppository(ies) rectally once  -- Indication: For COnstipation    docusate sodium 100 mg oral capsule  -- 1 cap(s) by mouth 3 times a day  -- Indication: For COnstipation    polyethylene glycol 3350 oral powder for reconstitution  -- 17 gram(s) by mouth once a day  -- Indication: For COnstipation    Singulair 10 mg oral tablet  -- 1 tab(s) by mouth once a day (in the evening)  -- Indication: For COPD    azithromycin 250 mg oral tablet  -- 1 tab(s) by mouth 3 times a week - Tues Thurs Sat  -- Indication: For COPD    saliva substitutes oral solution  -- 5 milliliter(s) by mouth 2 times a day - Swish and Spit  -- Indication: For on inhalers    sodium chloride 0.65% nasal spray  -- 1 spray(s) into nose 2 times a day, As Needed  -- Indication: For Nasal decongesant    melatonin 1 mg oral tablet  -- 1 tab(s) by mouth once a day (at bedtime)  -- Indication: For Sleep aid    ocular lubricant ophthalmic solution  -- 1 drop(s) to each affected eye 3 times a day  -- Indication: For Dry eyes    pantoprazole 40 mg oral delayed release tablet  -- 1 tab(s) by mouth once a day (before a meal)  -- Indication: For GI prophylaxis    multivitamin  -- 1 tab(s) by mouth once a day  -- Indication: For Supplement    Vitamin D3 2000 intl units oral tablet  -- 1 tab(s) by mouth once a day  -- Indication: For Supplement I will START or STAY ON the medications listed below when I get home from the hospital:    budesonide 0.5 mg/2 mL inhalation suspension  -- 0.5 milligram(s) inhaled 2 times a day  -- Indication: For COPD exacerbation    predniSONE 5 mg oral tablet  -- 1 tab(s) by mouth once a day for 3 more days  -- Indication: For COPD exacerbation    aspirin 81 mg oral tablet, chewable  -- 1 tab(s) by mouth once a day  -- Indication: For Cardiac protection    Benicar 20 mg oral tablet  -- 1 tab(s) by mouth once a day  -- Indication: For Blood pressure control    isosorbide mononitrate 30 mg oral tablet, extended release  -- 1 tab(s) by mouth once a day (in the morning)  -- Indication: For CAD    dilTIAZem 240 mg/24 hours oral capsule, extended release  -- 1 cap(s) by mouth once a day  -- Indication: For Atrial fibrillation    apixaban 5 mg oral tablet  -- 1 tab(s) by mouth every 12 hours  -- Indication: For Anticoagulation for Afib    HumaLOG 100 units/mL subcutaneous solution  -- Sliding scale  1 Unit(s) if Glucose 151 - 200  2 Unit(s) if Glucose 201 - 250  3 Unit(s) if Glucose 251 - 300  4 Unit(s) if Glucose 301 - 350  5 Unit(s) if Glucose 351 - 400  6 Unit(s) if Glucose Greater Than 400  -- Indication: For Blood sugar control    HumaLOG 100 units/mL subcutaneous solution  -- Sliding scale  subcutaneous once a day (at bedtime)  0 Unit(s) if Glucose 61 - 250  1 Unit(s) if Glucose 251 - 300  2 Unit(s) if Glucose 301 - 350  3 Unit(s) if Glucose 351 - 400  4 Unit(s) if Glucose Greater Than 400  -- Indication: For Blood sugar control    HumaLOG 100 units/mL subcutaneous solution  -- 4 units subcutaneous once a day - with LUNCH  -- Indication: For Blood sugar control    HumaLOG 100 units/mL subcutaneous solution  -- 3 unit(s) subcutaneous once a day - with DINNER  -- Indication: For Blood sugar control    HumaLOG 100 units/mL subcutaneous solution  -- 5 units subcutaneous once a day with BREAKFAST  -- Indication: For Blood sugar control    Lantus 100 units/mL subcutaneous solution  -- 8 unit(s) subcutaneous once a day (at bedtime)  -- Indication: For Blood sugar control    Crestor 10 mg oral tablet  -- 1 tab(s) by mouth once a day (at bedtime)  -- Indication: For Hyperlipidemia    theophylline 24 hr extended release  -- 1 tab(s) by mouth once a day, (400mg tab)  -- Indication: For COPD    ipratropium 500 mcg/2.5 mL inhalation solution  -- 2.5 milliliter(s) inhaled every 6 hours  -- Indication: For COPD    levalbuterol 0.63 mg/3 mL inhalation solution  -- 3 milliliter(s) inhaled every 6 hours  -- Indication: For COPD exacerbation    hydrocortisone 1% topical cream  -- 1 application on skin once a day - left foot, then wrap with kerlix dressing  -- Indication: For Topical cream    calamine topical lotion  -- 1 application on skin 2 times a day - to b/l feet where there is pain  -- Indication: For Topical cream    bacitracin 500 units/g topical ointment  -- 1 application on skin once a day - wound  -- Indication: For wound    furosemide 40 mg oral tablet  -- 1 tab(s) by mouth once a day  -- Indication: For Diuretic    senna oral tablet  -- 2 tab(s) by mouth once a day (at bedtime)  -- Indication: For COnstipation    bisacodyl 10 mg rectal suppository  -- 1 suppository(ies) rectally once  -- Indication: For COnstipation    docusate sodium 100 mg oral capsule  -- 1 cap(s) by mouth 3 times a day  -- Indication: For COnstipation    polyethylene glycol 3350 oral powder for reconstitution  -- 17 gram(s) by mouth once a day  -- Indication: For COnstipation    Singulair 10 mg oral tablet  -- 1 tab(s) by mouth once a day (in the evening)  -- Indication: For COPD    azithromycin 250 mg oral tablet  -- 1 tab(s) by mouth 3 times a week - Tues Thurs Sat  -- Indication: For COPD    saliva substitutes oral solution  -- 5 milliliter(s) by mouth 2 times a day - Swish and Spit  -- Indication: For on inhalers    sodium chloride 0.65% nasal spray  -- 1 spray(s) into nose 2 times a day, As Needed  -- Indication: For Nasal decongesant    melatonin 1 mg oral tablet  -- 1 tab(s) by mouth once a day (at bedtime)  -- Indication: For Sleep aid    ocular lubricant ophthalmic solution  -- 1 drop(s) to each affected eye 3 times a day  -- Indication: For Dry eyes    pantoprazole 40 mg oral delayed release tablet  -- 1 tab(s) by mouth once a day (before a meal)  -- Indication: For GI prophylaxis    multivitamin  -- 1 tab(s) by mouth once a day  -- Indication: For Supplement    Vitamin D3 2000 intl units oral tablet  -- 1 tab(s) by mouth once a day  -- Indication: For Supplement

## 2018-12-28 NOTE — DISCHARGE NOTE ADULT - MEDICATION SUMMARY - MEDICATIONS TO CHANGE
I will SWITCH the dose or number of times a day I take the medications listed below when I get home from the hospital:    Symbicort 160 mcg-4.5 mcg/inh inhalation aerosol  -- 2 puff(s) inhaled 2 times a day

## 2018-12-28 NOTE — DISCHARGE NOTE ADULT - PATIENT PORTAL LINK FT
You can access the [facility] Patient Portal, offered by [facility], by registering with the following website: [email or web address]

## 2018-12-28 NOTE — DISCHARGE NOTE ADULT - CARE PLAN
Principal Discharge DX:	Acute on chronic respiratory failure with hypoxia and hypercapnia  Secondary Diagnosis:	Acute kidney injury  Secondary Diagnosis:	Chronic idiopathic constipation  Secondary Diagnosis:	COPD exacerbation  Secondary Diagnosis:	Leg edema  Secondary Diagnosis:	Leukocytosis Principal Discharge DX:	Acute on chronic respiratory failure with hypoxia and hypercapnia  Goal:	Free from s/s of resp distress  Secondary Diagnosis:	Acute kidney injury  Assessment and plan of treatment:	Avoid taking (NSAIDs) - (ex: Ibuprofen, Advil, Celebrex, Naprosyn)  Avoid taking any nephrotoxic agents (can harm kidneys) - Intravenous contrast for diagnostic testing, combination cold medications.  Have all medications adjusted for your renal function by your Health Care Provider.  Blood pressure control is important.  Take all medication as prescribed.  Secondary Diagnosis:	Chronic idiopathic constipation  Assessment and plan of treatment:	Take meds as directed   Keep hydrated  Secondary Diagnosis:	COPD exacerbation  Assessment and plan of treatment:	Call your Health Care provider upon arrival home to make a follow up appointment within one week.  Take all inhalers as prescribed by your Health Care Provider.  Take steroids as prescribed by your Health Care Provider.  If your cough increases infrequency and severity and/or you have shortness of breath or increased shortness of breath call your Health Care Provider.  If you develop fever, chills, night sweats, malaise, and/or change in mental status call your Health care Provider.  Nutrition is very important.  Eat small frequent meals.  Increase your activity as tolerated.  Do not stay in bed all day Principal Discharge DX:	Acute on chronic respiratory failure with hypoxia and hypercapnia  Goal:	Free from s/s of resp distress  Assessment and plan of treatment:	Due to COPD exacerbation  Conitnue home O2 3L NC.   -Continue COPD management as above.  -Continue on BIPAP bedtime  -Plan for discharge to acute pulmonary rehab at [facility]  Secondary Diagnosis:	COPD exacerbation  Assessment and plan of treatment:	-Continue Prednisone taper (slow taper) per pulm. Start Prednisone 5mg daily x 5 days started on 1/14/19  -Continue Pulmicort & Duoneb nebs, Theophylline, Singulair  -Continue bipap at night. Patient does NOT require BIPAP.  -Plan for discharge to acute pulmonary rehab at [facility]  Secondary Diagnosis:	Atrial fibrillation with RVR  Assessment and plan of treatment:	A fib with RVR resolved s/p successful CV and Amiodarone  -Continue Cardizem 240mg CR po qd  You were started on blood thinners - Eliquis to prevent possible clot and stroke complications.   Monitor for any signs of bleeding and avoid injury while on this medication  If any bleeding persistent and symptomatic stop the medication and notify your doctor immediately.  It helps if you control your blood pressure, not drink more than 1-2 alcohol drinks per day, cut down on caffeine, getting treatment for over active thyroid gland, and get regular exercise  Call your doctor if you feel your heart racing or beating unusually, chest tightness or pain, lightheaded, faint, shortness of breath especially with exercise  It is important to take your heart medication as prescribed  Secondary Diagnosis:	Acute kidney injury  Assessment and plan of treatment:	Avoid taking (NSAIDs) - (ex: Ibuprofen, Advil, Celebrex, Naprosyn)  Avoid taking any nephrotoxic agents (can harm kidneys) - Intravenous contrast for diagnostic testing, combination cold medications.  Have all medications adjusted for your renal function by your Health Care Provider.  Blood pressure control is important.  Take all medication as prescribed.  Secondary Diagnosis:	Type 2 diabetes mellitus with hyperglycemia, without long-term current use of insulin  Assessment and plan of treatment:	A1C 7.2  -c/w Lantus 10 units QHS  -c/w Humalog  4-4-3 w/meals   -Continue to monitor blood sugars.  Secondary Diagnosis:	Essential hypertension  Assessment and plan of treatment:	-Continue Imdur, c/w diltiazem as above.  Secondary Diagnosis:	Chronic diastolic (congestive) heart failure  Assessment and plan of treatment:	-Echo with mild diastolic dysfunction, no significant changes from prior study in 2014.  -Continue Lasix. Principal Discharge DX:	Acute on chronic respiratory failure with hypoxia and hypercapnia  Goal:	Free from s/s of resp distress  Assessment and plan of treatment:	Due to COPD exacerbation  Conitnue home O2 3L NC.   -Continue COPD management as above.  -Continue on BIPAP bedtime  -Plan for discharge to acute pulmonary rehab at [facility]  Secondary Diagnosis:	COPD exacerbation  Assessment and plan of treatment:	-Continue Prednisone taper (slow taper) per pulm. Start Prednisone 5mg daily x 5 days started on 1/14/19  -Continue Pulmicort & Duoneb nebs, Theophylline, Singulair  -Continue bipap at night. Patient does NOT require BIPAP.  -Plan for discharge to acute pulmonary rehab at [facility]  Secondary Diagnosis:	Atrial fibrillation with RVR  Assessment and plan of treatment:	A fib with RVR resolved s/p successful CV and Amiodarone  -Continue Cardizem 240mg CR po qd  You were started on blood thinners - Eliquis to prevent possible clot and stroke complications.   Monitor for any signs of bleeding and avoid injury while on this medication  If any bleeding persistent and symptomatic stop the medication and notify your doctor immediately.  It helps if you control your blood pressure, not drink more than 1-2 alcohol drinks per day, cut down on caffeine, getting treatment for over active thyroid gland, and get regular exercise  Call your doctor if you feel your heart racing or beating unusually, chest tightness or pain, lightheaded, faint, shortness of breath especially with exercise  It is important to take your heart medication as prescribed  Secondary Diagnosis:	Acute kidney injury  Assessment and plan of treatment:	Avoid taking (NSAIDs) - (ex: Ibuprofen, Advil, Celebrex, Naprosyn)  Avoid taking any nephrotoxic agents (can harm kidneys) - Intravenous contrast for diagnostic testing, combination cold medications.  Have all medications adjusted for your renal function by your Health Care Provider.  Blood pressure control is important.  Take all medication as prescribed.  Secondary Diagnosis:	Type 2 diabetes mellitus with hyperglycemia, without long-term current use of insulin  Assessment and plan of treatment:	A1C 7.2  -c/w Lantus QHS, premeal Humalog and sliding scale while on steroids  -Continue to monitor blood sugars  Patient on insulin both Humalog premeal and Lantus while on steroids - Restart Metformin if off steroids  Secondary Diagnosis:	Essential hypertension  Assessment and plan of treatment:	-Continue Imdur, c/w diltiazem as above.  Secondary Diagnosis:	Chronic diastolic (congestive) heart failure  Assessment and plan of treatment:	-Echo with mild diastolic dysfunction, no significant changes from prior study in 2014.  -Continue Lasix.

## 2018-12-28 NOTE — PROGRESS NOTE ADULT - SUBJECTIVE AND OBJECTIVE BOX
Diabetes Follow up note:  Interval Hx:  57 y/o F w/h/o controlled T2DM on Metformin 500mg bid. Also h/o CAD and COPD. Here with COPD exacerbation> now on Prednisone 20mg daily. BG values improved in last 24 hours. Noted on and off hyperglycemia likely due to pt's PO intake. Pt has multiple snack bags at bedside. Now requiring bipap more often. Discharge planning to pulm rehab ongoing.     Review of Systems:  General: "feeling the same"  GI: Tolerating POs without any N/V/D/ABD PAIN.  Resp: + SOB  ENDO: No S&Sx of hypoglycemia      MEDS:  insulin glargine Injectable (LANTUS) 5 Unit(s) SubCutaneous at bedtime  insulin lispro (HumaLOG) corrective regimen sliding scale   SubCutaneous three times a day before meals  insulin lispro (HumaLOG) corrective regimen sliding scale   SubCutaneous at bedtime  insulin lispro Injectable (HumaLOG) 2 Unit(s) SubCutaneous with dinner  insulin lispro Injectable (HumaLOG) 4Unit(s) SubCutaneous before lunch  insulin lispro Injectable (HumaLOG) 4 Unit(s) SubCutaneous before breakfast  predniSONE   Tablet 20 milliGRAM(s) Oral daily  azithromycin   Tablet 250 milliGRAM(s) Oral <User Schedule>  nystatin    Suspension 541331 Unit(s) Oral four times a day  cholecalciferol 2000 Unit(s) Oral daily  theophylline ER Capsule 400 milliGRAM(s) Oral daily    Allergies    No Known Allergies        PE:  General: Female sitting in chair. On BIPAP  Vital Signs Last 24 Hrs  T(C): 36.4 (12-28-18 @ 13:51), Max: 36.8 (12-27-18 @ 22:13)  T(F): 97.6 (12-28-18 @ 13:51), Max: 98.2 (12-27-18 @ 22:13)  HR: 90 (12-28-18 @ 13:51) (87 - 101)  BP: 128/79 (12-28-18 @ 13:51) (128/78 - 135/79)  BP(mean): --  RR: 18 (12-28-18 @ 13:51) (18 - 18)  SpO2: 95% (12-28-18 @ 13:51) (94% - 100%)  Abd: Soft, NT, D, Obese.   Extremities: Warm. B/L LE pedal edema. LUE edema.   Neuro: A&O X3    LABS:  POCT Blood Glucose.: 165 mg/dL (12-28-18 @ 13:02)  POCT Blood Glucose.: 148 mg/dL (12-28-18 @ 08:36)  POCT Blood Glucose.: 180 mg/dL (12-27-18 @ 21:53)  POCT Blood Glucose.: 134 mg/dL (12-27-18 @ 17:40)  POCT Blood Glucose.: 220 mg/dL (12-27-18 @ 12:37)  POCT Blood Glucose.: 162 mg/dL (12-27-18 @ 08:38)  POCT Blood Glucose.: 213 mg/dL (12-26-18 @ 21:22)  POCT Blood Glucose.: 222 mg/dL (12-26-18 @ 17:37)                          10.7   8.4   )-----------( 136      ( 28 Dec 2018 14:29 )             31.6     12-27    134<L>  |  88<L>  |  38<H>  ----------------------------<  252<H>  4.6   |  36<H>  |  0.99    Ca    9.7      27 Dec 2018 11:55        Hemoglobin A1C, Whole Blood: 7.2 % <H> [4.0 - 5.6] (11-30-18 @ 07:58)

## 2018-12-28 NOTE — PROGRESS NOTE ADULT - PROBLEM SELECTOR PLAN 1
c/w Prednisone 20mg po qd   Continue Pulmicort, Duoneb ATC   c/w Theophylline, Singulair.  Completed 7 days of biaxin   Home Trilogy vent arranged by pulmonary.  c/w intermittent bipap ; alternating with 5L NC  C/w daliresp  Pulm follow up with [place]/transplant list  cw azithromycin   D/w pulmonary plan for discharge to acute pulmonary rehab  Appreciate pm&r recs

## 2018-12-28 NOTE — DISCHARGE NOTE ADULT - SECONDARY DIAGNOSIS.
Acute kidney injury Chronic idiopathic constipation COPD exacerbation Leg edema Leukocytosis Atrial fibrillation with RVR Type 2 diabetes mellitus with hyperglycemia, without long-term current use of insulin Essential hypertension Chronic diastolic (congestive) heart failure

## 2018-12-28 NOTE — PROGRESS NOTE ADULT - SUBJECTIVE AND OBJECTIVE BOX
Patient is a 58y old  Female who presents with a chief complaint of dyspnea (27 Dec 2018 14:55)      SUBJECTIVE / OVERNIGHT EVENTS: reports still with UE and LE swelling, had bm yesterday, still with dyspnea, no cp     MEDICATIONS  (STANDING):  ALBUTerol/ipratropium for Nebulization 3 milliLiter(s) Nebulizer <User Schedule>  artificial tears (preservative free) Ophthalmic Solution 1 Drop(s) Both EYES three times a day  aspirin enteric coated 81 milliGRAM(s) Oral daily  azithromycin   Tablet 250 milliGRAM(s) Oral <User Schedule>  Biotene Dry Mouth Oral Rinse 5 milliLiter(s) Swish and Spit two times a day  buDESOnide   0.5 milliGRAM(s) Respule 0.5 milliGRAM(s) Inhalation every 12 hours  cholecalciferol 2000 Unit(s) Oral daily  dextrose 5%. 1000 milliLiter(s) (50 mL/Hr) IV Continuous <Continuous>  dextrose 50% Injectable 12.5 Gram(s) IV Push once  dextrose 50% Injectable 25 Gram(s) IV Push once  dextrose 50% Injectable 25 Gram(s) IV Push once  docusate sodium 100 milliGRAM(s) Oral daily  enoxaparin Injectable 40 milliGRAM(s) SubCutaneous every 24 hours  furosemide    Tablet 40 milliGRAM(s) Oral daily  insulin glargine Injectable (LANTUS) 5 Unit(s) SubCutaneous at bedtime  insulin lispro (HumaLOG) corrective regimen sliding scale   SubCutaneous three times a day before meals  insulin lispro (HumaLOG) corrective regimen sliding scale   SubCutaneous at bedtime  insulin lispro Injectable (HumaLOG) 2 Unit(s) SubCutaneous with dinner  insulin lispro Injectable (HumaLOG) 4 Unit(s) SubCutaneous before breakfast  insulin lispro Injectable (HumaLOG) 4 Unit(s) SubCutaneous before lunch  isosorbide   mononitrate ER Tablet (IMDUR) 30 milliGRAM(s) Oral daily  melatonin 1 milliGRAM(s) Oral at bedtime  montelukast 10 milliGRAM(s) Oral daily  multivitamin 1 Tablet(s) Oral daily  nystatin    Suspension 264140 Unit(s) Oral four times a day  pantoprazole    Tablet 40 milliGRAM(s) Oral before breakfast  polyethylene glycol 3350 17 Gram(s) Oral daily  predniSONE   Tablet 20 milliGRAM(s) Oral daily  Roflumilast (Daliresp) 250 MICROGram(s) 250 MICROGram(s) Oral daily  senna 2 Tablet(s) Oral at bedtime  simethicone 80 milliGRAM(s) Chew once  theophylline ER Capsule 400 milliGRAM(s) Oral daily    MEDICATIONS  (PRN):  bisacodyl Suppository 10 milliGRAM(s) Rectal daily PRN Constipation  dextrose 40% Gel 15 Gram(s) Oral once PRN Blood Glucose LESS THAN 70 milliGRAM(s)/deciliter  glucagon  Injectable 1 milliGRAM(s) IntraMuscular once PRN Glucose LESS THAN 70 milligrams/deciliter  ondansetron Injectable 4 milliGRAM(s) IV Push every 8 hours PRN Nausea and/or Vomiting  sodium chloride 0.65% Nasal 1 Spray(s) Both Nostrils two times a day PRN Nasal Congestion        CAPILLARY BLOOD GLUCOSE      POCT Blood Glucose.: 148 mg/dL (28 Dec 2018 08:36)  POCT Blood Glucose.: 180 mg/dL (27 Dec 2018 21:53)  POCT Blood Glucose.: 134 mg/dL (27 Dec 2018 17:40)    I&O's Summary    27 Dec 2018 07:01  -  28 Dec 2018 07:00  --------------------------------------------------------  IN: 1160 mL / OUT: 3375 mL / NET: -2215 mL    28 Dec 2018 07:01  -  28 Dec 2018 12:58  --------------------------------------------------------  IN: 120 mL / OUT: 300 mL / NET: -180 mL        PHYSICAL EXAM:  GENERAL: NAD, well-developed  HEAD:  Atraumatic, Normocephalic  EYES: conjunctiva and sclera clear  NECK:  No JVD  CHEST/LUNG: Clear to auscultation bilaterally; No wheeze  HEART: Regular rate and rhythm; S1S2  ABDOMEN: Soft, Nontender, Nondistended; Bowel sounds present  EXTREMITIES:  +1-2 UE and LE edema   PSYCH: AAOx3  SKIN: No rashes or lesions    LABS:                        11.1   10.4  )-----------( 122      ( 27 Dec 2018 12:04 )             32.8     12-27    134<L>  |  88<L>  |  38<H>  ----------------------------<  252<H>  4.6   |  36<H>  |  0.99    Ca    9.7      27 Dec 2018 11:55                RADIOLOGY & ADDITIONAL TESTS:    Imaging Personally Reviewed:    Consultant(s) Notes Reviewed:      Care Discussed with Consultants/Other Providers:

## 2018-12-29 LAB
ANION GAP SERPL CALC-SCNC: 11 MMOL/L — SIGNIFICANT CHANGE UP (ref 5–17)
BUN SERPL-MCNC: 29 MG/DL — HIGH (ref 7–23)
CALCIUM SERPL-MCNC: 9.6 MG/DL — SIGNIFICANT CHANGE UP (ref 8.4–10.5)
CHLORIDE SERPL-SCNC: 88 MMOL/L — LOW (ref 96–108)
CO2 SERPL-SCNC: 39 MMOL/L — HIGH (ref 22–31)
CREAT SERPL-MCNC: 1.11 MG/DL — SIGNIFICANT CHANGE UP (ref 0.5–1.3)
GLUCOSE BLDC GLUCOMTR-MCNC: 176 MG/DL — HIGH (ref 70–99)
GLUCOSE BLDC GLUCOMTR-MCNC: 206 MG/DL — HIGH (ref 70–99)
GLUCOSE BLDC GLUCOMTR-MCNC: 217 MG/DL — HIGH (ref 70–99)
GLUCOSE BLDC GLUCOMTR-MCNC: 312 MG/DL — HIGH (ref 70–99)
GLUCOSE SERPL-MCNC: 182 MG/DL — HIGH (ref 70–99)
HCT VFR BLD CALC: 32.9 % — LOW (ref 34.5–45)
HGB BLD-MCNC: 10.3 G/DL — LOW (ref 11.5–15.5)
MCHC RBC-ENTMCNC: 28.1 PG — SIGNIFICANT CHANGE UP (ref 27–34)
MCHC RBC-ENTMCNC: 31.3 GM/DL — LOW (ref 32–36)
MCV RBC AUTO: 89.6 FL — SIGNIFICANT CHANGE UP (ref 80–100)
PLATELET # BLD AUTO: 151 K/UL — SIGNIFICANT CHANGE UP (ref 150–400)
POTASSIUM SERPL-MCNC: 3.8 MMOL/L — SIGNIFICANT CHANGE UP (ref 3.5–5.3)
POTASSIUM SERPL-SCNC: 3.8 MMOL/L — SIGNIFICANT CHANGE UP (ref 3.5–5.3)
RBC # BLD: 3.67 M/UL — LOW (ref 3.8–5.2)
RBC # FLD: 14.3 % — SIGNIFICANT CHANGE UP (ref 10.3–14.5)
SODIUM SERPL-SCNC: 138 MMOL/L — SIGNIFICANT CHANGE UP (ref 135–145)
WBC # BLD: 7 K/UL — SIGNIFICANT CHANGE UP (ref 3.8–10.5)
WBC # FLD AUTO: 7 K/UL — SIGNIFICANT CHANGE UP (ref 3.8–10.5)

## 2018-12-29 PROCEDURE — 99233 SBSQ HOSP IP/OBS HIGH 50: CPT

## 2018-12-29 RX ADMIN — Medication 500000 UNIT(S): at 12:22

## 2018-12-29 RX ADMIN — INSULIN GLARGINE 5 UNIT(S): 100 INJECTION, SOLUTION SUBCUTANEOUS at 22:31

## 2018-12-29 RX ADMIN — ISOSORBIDE MONONITRATE 30 MILLIGRAM(S): 60 TABLET, EXTENDED RELEASE ORAL at 12:21

## 2018-12-29 RX ADMIN — AZITHROMYCIN 250 MILLIGRAM(S): 500 TABLET, FILM COATED ORAL at 09:03

## 2018-12-29 RX ADMIN — Medication 3 MILLILITER(S): at 12:21

## 2018-12-29 RX ADMIN — Medication 0.5 MILLIGRAM(S): at 17:52

## 2018-12-29 RX ADMIN — ENOXAPARIN SODIUM 40 MILLIGRAM(S): 100 INJECTION SUBCUTANEOUS at 22:08

## 2018-12-29 RX ADMIN — Medication 500000 UNIT(S): at 00:19

## 2018-12-29 RX ADMIN — Medication 500000 UNIT(S): at 22:33

## 2018-12-29 RX ADMIN — Medication 3 MILLILITER(S): at 22:09

## 2018-12-29 RX ADMIN — SENNA PLUS 2 TABLET(S): 8.6 TABLET ORAL at 22:07

## 2018-12-29 RX ADMIN — Medication 4 UNIT(S): at 13:38

## 2018-12-29 RX ADMIN — Medication 1 TABLET(S): at 12:22

## 2018-12-29 RX ADMIN — Medication 4: at 17:53

## 2018-12-29 RX ADMIN — Medication 1 DROP(S): at 06:17

## 2018-12-29 RX ADMIN — Medication 3 MILLILITER(S): at 17:52

## 2018-12-29 RX ADMIN — Medication 4 UNIT(S): at 09:03

## 2018-12-29 RX ADMIN — Medication 2 UNIT(S): at 17:53

## 2018-12-29 RX ADMIN — Medication 400 MILLIGRAM(S): at 09:03

## 2018-12-29 RX ADMIN — ONDANSETRON 4 MILLIGRAM(S): 8 TABLET, FILM COATED ORAL at 12:22

## 2018-12-29 RX ADMIN — Medication 1 DROP(S): at 12:22

## 2018-12-29 RX ADMIN — Medication 1 DROP(S): at 22:08

## 2018-12-29 RX ADMIN — Medication 81 MILLIGRAM(S): at 12:21

## 2018-12-29 RX ADMIN — Medication 20 MILLIGRAM(S): at 06:07

## 2018-12-29 RX ADMIN — Medication 100 MILLIGRAM(S): at 12:22

## 2018-12-29 RX ADMIN — Medication 500000 UNIT(S): at 09:03

## 2018-12-29 RX ADMIN — Medication 3 MILLILITER(S): at 06:07

## 2018-12-29 RX ADMIN — Medication 3 MILLILITER(S): at 13:39

## 2018-12-29 RX ADMIN — Medication 500000 UNIT(S): at 17:52

## 2018-12-29 RX ADMIN — PANTOPRAZOLE SODIUM 40 MILLIGRAM(S): 20 TABLET, DELAYED RELEASE ORAL at 06:16

## 2018-12-29 RX ADMIN — Medication 40 MILLIGRAM(S): at 06:17

## 2018-12-29 RX ADMIN — MONTELUKAST 10 MILLIGRAM(S): 4 TABLET, CHEWABLE ORAL at 12:22

## 2018-12-29 RX ADMIN — Medication 1: at 13:38

## 2018-12-29 RX ADMIN — Medication 0.5 MILLIGRAM(S): at 06:07

## 2018-12-29 RX ADMIN — Medication 2: at 09:03

## 2018-12-29 RX ADMIN — ONDANSETRON 4 MILLIGRAM(S): 8 TABLET, FILM COATED ORAL at 22:14

## 2018-12-29 RX ADMIN — Medication 2000 UNIT(S): at 12:22

## 2018-12-29 NOTE — PROGRESS NOTE ADULT - PROBLEM SELECTOR PLAN 1
c/w Prednisone 20mg po qd   Continue Pulmicort, Duoneb ATC   c/w Theophylline, Singulair.  Completed 7 days of biaxin   Home Trilogy vent arranged by pulmonary.  c/w intermittent bipap ; alternating with 5L NC  C/w daliresp  Pulm follow up with [facility]/transplant list  cw azithromycin   D/w pulmonary plan for discharge to acute pulmonary rehab  Appreciate pm&r recs

## 2018-12-29 NOTE — PROGRESS NOTE ADULT - SUBJECTIVE AND OBJECTIVE BOX
Patient is a 58y old  Female who presents with a chief complaint of dyspnea (28 Dec 2018 15:50)      SUBJECTIVE / OVERNIGHT EVENTS:    MEDICATIONS  (STANDING):  ALBUTerol/ipratropium for Nebulization 3 milliLiter(s) Nebulizer <User Schedule>  artificial tears (preservative free) Ophthalmic Solution 1 Drop(s) Both EYES three times a day  aspirin enteric coated 81 milliGRAM(s) Oral daily  azithromycin   Tablet 250 milliGRAM(s) Oral <User Schedule>  Biotene Dry Mouth Oral Rinse 5 milliLiter(s) Swish and Spit two times a day  buDESOnide   0.5 milliGRAM(s) Respule 0.5 milliGRAM(s) Inhalation every 12 hours  cholecalciferol 2000 Unit(s) Oral daily  dextrose 5%. 1000 milliLiter(s) (50 mL/Hr) IV Continuous <Continuous>  dextrose 50% Injectable 12.5 Gram(s) IV Push once  dextrose 50% Injectable 25 Gram(s) IV Push once  dextrose 50% Injectable 25 Gram(s) IV Push once  docusate sodium 100 milliGRAM(s) Oral daily  enoxaparin Injectable 40 milliGRAM(s) SubCutaneous every 24 hours  furosemide    Tablet 40 milliGRAM(s) Oral daily  insulin glargine Injectable (LANTUS) 5 Unit(s) SubCutaneous at bedtime  insulin lispro (HumaLOG) corrective regimen sliding scale   SubCutaneous three times a day before meals  insulin lispro (HumaLOG) corrective regimen sliding scale   SubCutaneous at bedtime  insulin lispro Injectable (HumaLOG) 2 Unit(s) SubCutaneous with dinner  insulin lispro Injectable (HumaLOG) 4 Unit(s) SubCutaneous before breakfast  insulin lispro Injectable (HumaLOG) 4 Unit(s) SubCutaneous before lunch  isosorbide   mononitrate ER Tablet (IMDUR) 30 milliGRAM(s) Oral daily  melatonin 1 milliGRAM(s) Oral at bedtime  montelukast 10 milliGRAM(s) Oral daily  multivitamin 1 Tablet(s) Oral daily  nystatin    Suspension 129641 Unit(s) Oral four times a day  pantoprazole    Tablet 40 milliGRAM(s) Oral before breakfast  polyethylene glycol 3350 17 Gram(s) Oral daily  predniSONE   Tablet 20 milliGRAM(s) Oral daily  Roflumilast (Daliresp) 250 MICROGram(s) 250 MICROGram(s) Oral daily  senna 2 Tablet(s) Oral at bedtime  simethicone 80 milliGRAM(s) Chew once  theophylline ER Capsule 400 milliGRAM(s) Oral daily    MEDICATIONS  (PRN):  bisacodyl Suppository 10 milliGRAM(s) Rectal daily PRN Constipation  dextrose 40% Gel 15 Gram(s) Oral once PRN Blood Glucose LESS THAN 70 milliGRAM(s)/deciliter  glucagon  Injectable 1 milliGRAM(s) IntraMuscular once PRN Glucose LESS THAN 70 milligrams/deciliter  ondansetron Injectable 4 milliGRAM(s) IV Push every 8 hours PRN Nausea and/or Vomiting  sodium chloride 0.65% Nasal 1 Spray(s) Both Nostrils two times a day PRN Nasal Congestion      Vital Signs Last 24 Hrs  T(C): 36.6 (29 Dec 2018 05:57), Max: 36.8 (28 Dec 2018 21:58)  T(F): 97.8 (29 Dec 2018 05:57), Max: 98.2 (28 Dec 2018 21:58)  HR: 97 (29 Dec 2018 05:57) (90 - 97)  BP: 140/80 (29 Dec 2018 05:57) (128/79 - 140/80)  BP(mean): --  RR: 20 (29 Dec 2018 05:57) (18 - 20)  SpO2: 100% (29 Dec 2018 05:57) (95% - 100%)  CAPILLARY BLOOD GLUCOSE      POCT Blood Glucose.: 206 mg/dL (29 Dec 2018 08:40)  POCT Blood Glucose.: 339 mg/dL (28 Dec 2018 21:50)  POCT Blood Glucose.: 188 mg/dL (28 Dec 2018 17:26)  POCT Blood Glucose.: 165 mg/dL (28 Dec 2018 13:02)    I&O's Summary    28 Dec 2018 07:01  -  29 Dec 2018 07:00  --------------------------------------------------------  IN: 430 mL / OUT: 700 mL / NET: -270 mL        PHYSICAL EXAM:  GENERAL: NAD, well-developed  HEAD:  Atraumatic, Normocephalic  EYES: EOMI, PERRLA, conjunctiva and sclera clear  NECK: Supple, No JVD  CHEST/LUNG: Clear to auscultation bilaterally; No wheeze  HEART: Regular rate and rhythm; No murmurs, rubs, or gallops  ABDOMEN: Soft, Nontender, Nondistended; Bowel sounds present  EXTREMITIES:  2+ Peripheral Pulses, No clubbing, cyanosis, or edema  PSYCH: AAOx3  NEUROLOGY: non-focal  SKIN: No rashes or lesions    LABS:                        10.3   7.00  )-----------( 151      ( 29 Dec 2018 07:47 )             32.9     12-29    138  |  88<L>  |  29<H>  ----------------------------<  182<H>  3.8   |  39<H>  |  1.11    Ca    9.6      29 Dec 2018 06:33                RADIOLOGY & ADDITIONAL TESTS:    Imaging Personally Reviewed:    Consultant(s) Notes Reviewed:      Care Discussed with Consultants/Other Providers: Patient is a 58y old  Female who presents with a chief complaint of dyspnea (28 Dec 2018 15:50)      SUBJECTIVE / OVERNIGHT EVENTS:  Pt seen and examined. No acute events overnight. She reports no improvement in dyspnea.    MEDICATIONS  (STANDING):  ALBUTerol/ipratropium for Nebulization 3 milliLiter(s) Nebulizer <User Schedule>  artificial tears (preservative free) Ophthalmic Solution 1 Drop(s) Both EYES three times a day  aspirin enteric coated 81 milliGRAM(s) Oral daily  azithromycin   Tablet 250 milliGRAM(s) Oral <User Schedule>  Biotene Dry Mouth Oral Rinse 5 milliLiter(s) Swish and Spit two times a day  buDESOnide   0.5 milliGRAM(s) Respule 0.5 milliGRAM(s) Inhalation every 12 hours  cholecalciferol 2000 Unit(s) Oral daily  dextrose 5%. 1000 milliLiter(s) (50 mL/Hr) IV Continuous <Continuous>  dextrose 50% Injectable 12.5 Gram(s) IV Push once  dextrose 50% Injectable 25 Gram(s) IV Push once  dextrose 50% Injectable 25 Gram(s) IV Push once  docusate sodium 100 milliGRAM(s) Oral daily  enoxaparin Injectable 40 milliGRAM(s) SubCutaneous every 24 hours  furosemide    Tablet 40 milliGRAM(s) Oral daily  insulin glargine Injectable (LANTUS) 5 Unit(s) SubCutaneous at bedtime  insulin lispro (HumaLOG) corrective regimen sliding scale   SubCutaneous three times a day before meals  insulin lispro (HumaLOG) corrective regimen sliding scale   SubCutaneous at bedtime  insulin lispro Injectable (HumaLOG) 2 Unit(s) SubCutaneous with dinner  insulin lispro Injectable (HumaLOG) 4 Unit(s) SubCutaneous before breakfast  insulin lispro Injectable (HumaLOG) 4 Unit(s) SubCutaneous before lunch  isosorbide   mononitrate ER Tablet (IMDUR) 30 milliGRAM(s) Oral daily  melatonin 1 milliGRAM(s) Oral at bedtime  montelukast 10 milliGRAM(s) Oral daily  multivitamin 1 Tablet(s) Oral daily  nystatin    Suspension 157797 Unit(s) Oral four times a day  pantoprazole    Tablet 40 milliGRAM(s) Oral before breakfast  polyethylene glycol 3350 17 Gram(s) Oral daily  predniSONE   Tablet 20 milliGRAM(s) Oral daily  Roflumilast (Daliresp) 250 MICROGram(s) 250 MICROGram(s) Oral daily  senna 2 Tablet(s) Oral at bedtime  simethicone 80 milliGRAM(s) Chew once  theophylline ER Capsule 400 milliGRAM(s) Oral daily    MEDICATIONS  (PRN):  bisacodyl Suppository 10 milliGRAM(s) Rectal daily PRN Constipation  dextrose 40% Gel 15 Gram(s) Oral once PRN Blood Glucose LESS THAN 70 milliGRAM(s)/deciliter  glucagon  Injectable 1 milliGRAM(s) IntraMuscular once PRN Glucose LESS THAN 70 milligrams/deciliter  ondansetron Injectable 4 milliGRAM(s) IV Push every 8 hours PRN Nausea and/or Vomiting  sodium chloride 0.65% Nasal 1 Spray(s) Both Nostrils two times a day PRN Nasal Congestion      Vital Signs Last 24 Hrs  T(C): 36.6 (29 Dec 2018 05:57), Max: 36.8 (28 Dec 2018 21:58)  T(F): 97.8 (29 Dec 2018 05:57), Max: 98.2 (28 Dec 2018 21:58)  HR: 97 (29 Dec 2018 05:57) (90 - 97)  BP: 140/80 (29 Dec 2018 05:57) (128/79 - 140/80)  BP(mean): --  RR: 20 (29 Dec 2018 05:57) (18 - 20)  SpO2: 100% (29 Dec 2018 05:57) (95% - 100%)  CAPILLARY BLOOD GLUCOSE      POCT Blood Glucose.: 206 mg/dL (29 Dec 2018 08:40)  POCT Blood Glucose.: 339 mg/dL (28 Dec 2018 21:50)  POCT Blood Glucose.: 188 mg/dL (28 Dec 2018 17:26)  POCT Blood Glucose.: 165 mg/dL (28 Dec 2018 13:02)    I&O's Summary    28 Dec 2018 07:01  -  29 Dec 2018 07:00  --------------------------------------------------------  IN: 430 mL / OUT: 700 mL / NET: -270 mL        PHYSICAL EXAM:  GENERAL: NAD, anicteric, afebrile  HEAD:  Atraumatic, Normocephalic  EYES: EOMI, PERRLA, conjunctiva and sclera clear  NECK: Supple, No JVD  CHEST/LUNG: Clear to auscultation bilaterally; No wheeze  HEART: Regular rate and rhythm; No murmurs, rubs, or gallops  ABDOMEN: Soft, Nontender, Nondistended; Bowel sounds present  EXTREMITIES: b/l UE /LE edema   PSYCH: AAOx3  NEUROLOGY: non-focal  SKIN: No rashes or lesions    LABS:                        10.3   7.00  )-----------( 151      ( 29 Dec 2018 07:47 )             32.9     12-29    138  |  88<L>  |  29<H>  ----------------------------<  182<H>  3.8   |  39<H>  |  1.11    Ca    9.6      29 Dec 2018 06:33

## 2018-12-29 NOTE — PROGRESS NOTE ADULT - PROBLEM SELECTOR PLAN 3
A1C 7.2  -c/w Lantus 5 units QHS  -c/w Humalog  4-3-2 units  w/meals   - Continue to monitor blood sugars.  - Endocrine following

## 2018-12-30 LAB
GLUCOSE BLDC GLUCOMTR-MCNC: 123 MG/DL — HIGH (ref 70–99)
GLUCOSE BLDC GLUCOMTR-MCNC: 180 MG/DL — HIGH (ref 70–99)
GLUCOSE BLDC GLUCOMTR-MCNC: 184 MG/DL — HIGH (ref 70–99)
GLUCOSE BLDC GLUCOMTR-MCNC: 261 MG/DL — HIGH (ref 70–99)
GLUCOSE BLDC GLUCOMTR-MCNC: 77 MG/DL — SIGNIFICANT CHANGE UP (ref 70–99)
HCT VFR BLD CALC: 32.8 % — LOW (ref 34.5–45)
HGB BLD-MCNC: 10.2 G/DL — LOW (ref 11.5–15.5)
MCHC RBC-ENTMCNC: 28.4 PG — SIGNIFICANT CHANGE UP (ref 27–34)
MCHC RBC-ENTMCNC: 31.1 GM/DL — LOW (ref 32–36)
MCV RBC AUTO: 91.4 FL — SIGNIFICANT CHANGE UP (ref 80–100)
PLATELET # BLD AUTO: 167 K/UL — SIGNIFICANT CHANGE UP (ref 150–400)
RBC # BLD: 3.59 M/UL — LOW (ref 3.8–5.2)
RBC # FLD: 14.2 % — SIGNIFICANT CHANGE UP (ref 10.3–14.5)
WBC # BLD: 6.81 K/UL — SIGNIFICANT CHANGE UP (ref 3.8–10.5)
WBC # FLD AUTO: 6.81 K/UL — SIGNIFICANT CHANGE UP (ref 3.8–10.5)

## 2018-12-30 PROCEDURE — 99233 SBSQ HOSP IP/OBS HIGH 50: CPT

## 2018-12-30 PROCEDURE — 99232 SBSQ HOSP IP/OBS MODERATE 35: CPT

## 2018-12-30 PROCEDURE — 93971 EXTREMITY STUDY: CPT | Mod: 26,59

## 2018-12-30 PROCEDURE — 93970 EXTREMITY STUDY: CPT | Mod: 26

## 2018-12-30 RX ORDER — INSULIN LISPRO 100/ML
3 VIAL (ML) SUBCUTANEOUS
Qty: 0 | Refills: 0 | Status: DISCONTINUED | OUTPATIENT
Start: 2018-12-30 | End: 2019-01-03

## 2018-12-30 RX ORDER — ONDANSETRON 8 MG/1
4 TABLET, FILM COATED ORAL ONCE
Qty: 0 | Refills: 0 | Status: COMPLETED | OUTPATIENT
Start: 2018-12-30 | End: 2018-12-30

## 2018-12-30 RX ORDER — INSULIN LISPRO 100/ML
5 VIAL (ML) SUBCUTANEOUS
Qty: 0 | Refills: 0 | Status: DISCONTINUED | OUTPATIENT
Start: 2018-12-30 | End: 2018-12-31

## 2018-12-30 RX ORDER — INSULIN GLARGINE 100 [IU]/ML
6 INJECTION, SOLUTION SUBCUTANEOUS AT BEDTIME
Qty: 0 | Refills: 0 | Status: DISCONTINUED | OUTPATIENT
Start: 2018-12-30 | End: 2019-01-02

## 2018-12-30 RX ADMIN — ONDANSETRON 4 MILLIGRAM(S): 8 TABLET, FILM COATED ORAL at 10:14

## 2018-12-30 RX ADMIN — Medication 1: at 08:49

## 2018-12-30 RX ADMIN — ONDANSETRON 4 MILLIGRAM(S): 8 TABLET, FILM COATED ORAL at 13:27

## 2018-12-30 RX ADMIN — Medication 0.5 MILLIGRAM(S): at 17:36

## 2018-12-30 RX ADMIN — ISOSORBIDE MONONITRATE 30 MILLIGRAM(S): 60 TABLET, EXTENDED RELEASE ORAL at 12:03

## 2018-12-30 RX ADMIN — Medication 3 MILLILITER(S): at 17:28

## 2018-12-30 RX ADMIN — Medication 2000 UNIT(S): at 12:04

## 2018-12-30 RX ADMIN — Medication 1 DROP(S): at 04:58

## 2018-12-30 RX ADMIN — Medication 1 DROP(S): at 21:22

## 2018-12-30 RX ADMIN — Medication 100 MILLIGRAM(S): at 12:03

## 2018-12-30 RX ADMIN — Medication 20 MILLIGRAM(S): at 05:00

## 2018-12-30 RX ADMIN — Medication 40 MILLIGRAM(S): at 05:01

## 2018-12-30 RX ADMIN — Medication 500000 UNIT(S): at 17:30

## 2018-12-30 RX ADMIN — Medication 4 UNIT(S): at 09:58

## 2018-12-30 RX ADMIN — Medication 1 DROP(S): at 15:39

## 2018-12-30 RX ADMIN — PANTOPRAZOLE SODIUM 40 MILLIGRAM(S): 20 TABLET, DELAYED RELEASE ORAL at 05:01

## 2018-12-30 RX ADMIN — Medication 3 MILLILITER(S): at 04:55

## 2018-12-30 RX ADMIN — Medication 3 MILLILITER(S): at 21:24

## 2018-12-30 RX ADMIN — Medication 3 MILLILITER(S): at 10:10

## 2018-12-30 RX ADMIN — Medication 1 TABLET(S): at 12:03

## 2018-12-30 RX ADMIN — Medication 3: at 13:29

## 2018-12-30 RX ADMIN — Medication 3 MILLILITER(S): at 15:40

## 2018-12-30 RX ADMIN — Medication 81 MILLIGRAM(S): at 12:03

## 2018-12-30 RX ADMIN — Medication 400 MILLIGRAM(S): at 08:50

## 2018-12-30 RX ADMIN — Medication 500000 UNIT(S): at 05:01

## 2018-12-30 RX ADMIN — INSULIN GLARGINE 6 UNIT(S): 100 INJECTION, SOLUTION SUBCUTANEOUS at 22:12

## 2018-12-30 RX ADMIN — Medication 500000 UNIT(S): at 21:29

## 2018-12-30 RX ADMIN — POLYETHYLENE GLYCOL 3350 17 GRAM(S): 17 POWDER, FOR SOLUTION ORAL at 17:42

## 2018-12-30 RX ADMIN — Medication 5 UNIT(S): at 13:29

## 2018-12-30 RX ADMIN — ENOXAPARIN SODIUM 40 MILLIGRAM(S): 100 INJECTION SUBCUTANEOUS at 21:19

## 2018-12-30 RX ADMIN — Medication 0.5 MILLIGRAM(S): at 05:00

## 2018-12-30 RX ADMIN — MONTELUKAST 10 MILLIGRAM(S): 4 TABLET, CHEWABLE ORAL at 12:04

## 2018-12-30 RX ADMIN — SENNA PLUS 2 TABLET(S): 8.6 TABLET ORAL at 21:21

## 2018-12-30 RX ADMIN — Medication 500000 UNIT(S): at 12:03

## 2018-12-30 NOTE — PROGRESS NOTE ADULT - SUBJECTIVE AND OBJECTIVE BOX
Diabetes Follow up note:  Interval Hx:  57 y/o F w/h/o controlled T2DM on Metformin 500mg bid. Also h/o CAD and COPD. Here with COPD exacerbation> now on Prednisone 20mg daily. Pt w/elevated glucose levels on/off over the past 2 days. Pt seen at bedside, on BIPAP mask. Reports eating complete meals recently. No hypoglycemia symptoms. Unsure why her sugar has spiked to 300s occasionally.     Review of Systems:  General: c/o foot pain and edema.   GI: Tolerating POs without any N/V/D/ABD PAIN.  CV: No CP/SOB  ENDO: No S&Sx of hypoglycemia  MEDS:    insulin glargine Injectable (LANTUS) 5 Unit(s) SubCutaneous at bedtime  insulin lispro (HumaLOG) corrective regimen sliding scale   SubCutaneous three times a day before meals  insulin lispro (HumaLOG) corrective regimen sliding scale   SubCutaneous at bedtime  insulin lispro Injectable (HumaLOG) 2 Unit(s) SubCutaneous with dinner  insulin lispro Injectable (HumaLOG) 4 Unit(s) SubCutaneous before breakfast  insulin lispro Injectable (HumaLOG) 4 Unit(s) SubCutaneous before lunch  predniSONE   Tablet 20 milliGRAM(s) Oral daily    azithromycin   Tablet 250 milliGRAM(s) Oral <User Schedule>  nystatin    Suspension 412986 Unit(s) Oral four times a day    Allergies    No Known Allergies        PE:  General: Female sitting in chair. On BIPAP mask.   Vital Signs Last 24 Hrs  T(C): 36.6 (30 Dec 2018 12:03), Max: 36.9 (29 Dec 2018 14:21)  T(F): 97.8 (30 Dec 2018 12:03), Max: 98.4 (29 Dec 2018 14:21)  HR: 87 (30 Dec 2018 12:03) (72 - 98)  BP: 131/65 (30 Dec 2018 12:03) (115/71 - 145/77)  BP(mean): --  RR: 20 (30 Dec 2018 12:03) (20 - 20)  SpO2: 100% (30 Dec 2018 12:03) (100% - 100%)  Abd: Soft, NT,ND, Obese.   Extremities: Warm. B/L pedal edema/tenderness. LUE edema (slightly improved).   Neuro: A&O X3    LABS:    POCT Blood Glucose.: 184 mg/dL (12-30-18 @ 08:36)  POCT Blood Glucose.: 217 mg/dL (12-29-18 @ 21:32)  POCT Blood Glucose.: 312 mg/dL (12-29-18 @ 17:27)  POCT Blood Glucose.: 176 mg/dL (12-29-18 @ 12:55)  POCT Blood Glucose.: 206 mg/dL (12-29-18 @ 08:40)  POCT Blood Glucose.: 339 mg/dL (12-28-18 @ 21:50)  POCT Blood Glucose.: 188 mg/dL (12-28-18 @ 17:26)  POCT Blood Glucose.: 165 mg/dL (12-28-18 @ 13:02)  POCT Blood Glucose.: 148 mg/dL (12-28-18 @ 08:36)  POCT Blood Glucose.: 180 mg/dL (12-27-18 @ 21:53)  POCT Blood Glucose.: 134 mg/dL (12-27-18 @ 17:40)  POCT Blood Glucose.: 220 mg/dL (12-27-18 @ 12:37)                            10.3   7.00  )-----------( 151      ( 29 Dec 2018 07:47 )             32.9       12-29    138  |  88<L>  |  29<H>  ----------------------------<  182<H>  3.8   |  39<H>  |  1.11    Ca    9.6      29 Dec 2018 06:33        Hemoglobin A1C, Whole Blood: 7.2 % <H> [4.0 - 5.6] (11-30-18 @ 07:58)            Contact number: kateryna [phone] or [phone]

## 2018-12-30 NOTE — PROGRESS NOTE ADULT - PROBLEM SELECTOR PLAN 1
c/w Prednisone 20mg po qd   Continue Pulmicort, Duoneb ATC   c/w Theophylline, Singulair.  Completed 7 days of biaxin   Home Trilogy vent arranged by pulmonary.  c/w intermittent bipap ; alternating with 5L NC  C/w daliresp  Pulm follow up with [facility]/transplant list  cw azithromycin   D/w pulmonary plan for discharge to acute pulmonary rehab  Appreciate pm&r recs

## 2018-12-30 NOTE — PROGRESS NOTE ADULT - PROBLEM SELECTOR PLAN 1
-test BG AC/HS  -Increase Lantus 6 units QHS  -Adjust Humalog 4-5-3 w/meals  -c/w Humalog low correction scale AC and Low HS scale  -Please notify endocrine when steroids further tapered so we can adjust the does of insulin!!!  discharge plan: restart home Metformin if off steroids  -Plan discussed with pt/team. If discharge to rehab on same steroid doses will continue basal/bolus therapy. Doses TBD  pager: [phone]

## 2018-12-30 NOTE — PROGRESS NOTE ADULT - SUBJECTIVE AND OBJECTIVE BOX
Patient is a 58y old  Female who presents with a chief complaint of dyspnea (29 Dec 2018 11:00)      SUBJECTIVE / OVERNIGHT EVENTS:    MEDICATIONS  (STANDING):  ALBUTerol/ipratropium for Nebulization 3 milliLiter(s) Nebulizer <User Schedule>  artificial tears (preservative free) Ophthalmic Solution 1 Drop(s) Both EYES three times a day  aspirin enteric coated 81 milliGRAM(s) Oral daily  azithromycin   Tablet 250 milliGRAM(s) Oral <User Schedule>  Biotene Dry Mouth Oral Rinse 5 milliLiter(s) Swish and Spit two times a day  buDESOnide   0.5 milliGRAM(s) Respule 0.5 milliGRAM(s) Inhalation every 12 hours  cholecalciferol 2000 Unit(s) Oral daily  dextrose 5%. 1000 milliLiter(s) (50 mL/Hr) IV Continuous <Continuous>  dextrose 50% Injectable 12.5 Gram(s) IV Push once  dextrose 50% Injectable 25 Gram(s) IV Push once  dextrose 50% Injectable 25 Gram(s) IV Push once  docusate sodium 100 milliGRAM(s) Oral daily  enoxaparin Injectable 40 milliGRAM(s) SubCutaneous every 24 hours  furosemide    Tablet 40 milliGRAM(s) Oral daily  insulin glargine Injectable (LANTUS) 5 Unit(s) SubCutaneous at bedtime  insulin lispro (HumaLOG) corrective regimen sliding scale   SubCutaneous three times a day before meals  insulin lispro (HumaLOG) corrective regimen sliding scale   SubCutaneous at bedtime  insulin lispro Injectable (HumaLOG) 2 Unit(s) SubCutaneous with dinner  insulin lispro Injectable (HumaLOG) 4 Unit(s) SubCutaneous before breakfast  insulin lispro Injectable (HumaLOG) 4 Unit(s) SubCutaneous before lunch  isosorbide   mononitrate ER Tablet (IMDUR) 30 milliGRAM(s) Oral daily  melatonin 1 milliGRAM(s) Oral at bedtime  montelukast 10 milliGRAM(s) Oral daily  multivitamin 1 Tablet(s) Oral daily  nystatin    Suspension 572834 Unit(s) Oral four times a day  pantoprazole    Tablet 40 milliGRAM(s) Oral before breakfast  polyethylene glycol 3350 17 Gram(s) Oral daily  predniSONE   Tablet 20 milliGRAM(s) Oral daily  Roflumilast (Daliresp) 250 MICROGram(s) 250 MICROGram(s) Oral daily  senna 2 Tablet(s) Oral at bedtime  simethicone 80 milliGRAM(s) Chew once  theophylline ER Capsule 400 milliGRAM(s) Oral daily    MEDICATIONS  (PRN):  bisacodyl Suppository 10 milliGRAM(s) Rectal daily PRN Constipation  dextrose 40% Gel 15 Gram(s) Oral once PRN Blood Glucose LESS THAN 70 milliGRAM(s)/deciliter  glucagon  Injectable 1 milliGRAM(s) IntraMuscular once PRN Glucose LESS THAN 70 milligrams/deciliter  ondansetron Injectable 4 milliGRAM(s) IV Push every 8 hours PRN Nausea and/or Vomiting  sodium chloride 0.65% Nasal 1 Spray(s) Both Nostrils two times a day PRN Nasal Congestion      Vital Signs Last 24 Hrs  T(C): 36.6 (30 Dec 2018 04:40), Max: 36.9 (29 Dec 2018 14:21)  T(F): 97.8 (30 Dec 2018 04:40), Max: 98.4 (29 Dec 2018 14:21)  HR: 72 (30 Dec 2018 05:35) (72 - 98)  BP: 145/77 (30 Dec 2018 04:40) (115/71 - 145/77)  BP(mean): --  RR: 20 (30 Dec 2018 04:40) (20 - 20)  SpO2: 100% (30 Dec 2018 05:35) (100% - 100%)  CAPILLARY BLOOD GLUCOSE      POCT Blood Glucose.: 184 mg/dL (30 Dec 2018 08:36)  POCT Blood Glucose.: 217 mg/dL (29 Dec 2018 21:32)  POCT Blood Glucose.: 312 mg/dL (29 Dec 2018 17:27)  POCT Blood Glucose.: 176 mg/dL (29 Dec 2018 12:55)    I&O's Summary    29 Dec 2018 07:01  -  30 Dec 2018 07:00  --------------------------------------------------------  IN: 560 mL / OUT: 1300 mL / NET: -740 mL        PHYSICAL EXAM:  GENERAL: NAD, well-developed  HEAD:  Atraumatic, Normocephalic  EYES: EOMI, PERRLA, conjunctiva and sclera clear  NECK: Supple, No JVD  CHEST/LUNG: Clear to auscultation bilaterally; No wheeze  HEART: Regular rate and rhythm; No murmurs, rubs, or gallops  ABDOMEN: Soft, Nontender, Nondistended; Bowel sounds present  EXTREMITIES:  2+ Peripheral Pulses, No clubbing, cyanosis, or edema  PSYCH: AAOx3  NEUROLOGY: non-focal  SKIN: No rashes or lesions    LABS:                        10.3   7.00  )-----------( 151      ( 29 Dec 2018 07:47 )             32.9     12-29    138  |  88<L>  |  29<H>  ----------------------------<  182<H>  3.8   |  39<H>  |  1.11    Ca    9.6      29 Dec 2018 06:33                RADIOLOGY & ADDITIONAL TESTS:    Imaging Personally Reviewed:    Consultant(s) Notes Reviewed:      Care Discussed with Consultants/Other Providers: Patient is a 58y old  Female who presents with a chief complaint of dyspnea (29 Dec 2018 11:00)      SUBJECTIVE / OVERNIGHT EVENTS:  Pt seen and examined. No acute events overnight. She reports no improvement in dyspnea.    MEDICATIONS  (STANDING):  ALBUTerol/ipratropium for Nebulization 3 milliLiter(s) Nebulizer <User Schedule>  artificial tears (preservative free) Ophthalmic Solution 1 Drop(s) Both EYES three times a day  aspirin enteric coated 81 milliGRAM(s) Oral daily  azithromycin   Tablet 250 milliGRAM(s) Oral <User Schedule>  Biotene Dry Mouth Oral Rinse 5 milliLiter(s) Swish and Spit two times a day  buDESOnide   0.5 milliGRAM(s) Respule 0.5 milliGRAM(s) Inhalation every 12 hours  cholecalciferol 2000 Unit(s) Oral daily  dextrose 5%. 1000 milliLiter(s) (50 mL/Hr) IV Continuous <Continuous>  dextrose 50% Injectable 12.5 Gram(s) IV Push once  dextrose 50% Injectable 25 Gram(s) IV Push once  dextrose 50% Injectable 25 Gram(s) IV Push once  docusate sodium 100 milliGRAM(s) Oral daily  enoxaparin Injectable 40 milliGRAM(s) SubCutaneous every 24 hours  furosemide    Tablet 40 milliGRAM(s) Oral daily  insulin glargine Injectable (LANTUS) 5 Unit(s) SubCutaneous at bedtime  insulin lispro (HumaLOG) corrective regimen sliding scale   SubCutaneous three times a day before meals  insulin lispro (HumaLOG) corrective regimen sliding scale   SubCutaneous at bedtime  insulin lispro Injectable (HumaLOG) 2 Unit(s) SubCutaneous with dinner  insulin lispro Injectable (HumaLOG) 4 Unit(s) SubCutaneous before breakfast  insulin lispro Injectable (HumaLOG) 4 Unit(s) SubCutaneous before lunch  isosorbide   mononitrate ER Tablet (IMDUR) 30 milliGRAM(s) Oral daily  melatonin 1 milliGRAM(s) Oral at bedtime  montelukast 10 milliGRAM(s) Oral daily  multivitamin 1 Tablet(s) Oral daily  nystatin    Suspension [id number] Unit(s) Oral four times a day  pantoprazole    Tablet 40 milliGRAM(s) Oral before breakfast  polyethylene glycol 3350 17 Gram(s) Oral daily  predniSONE   Tablet 20 milliGRAM(s) Oral daily  Roflumilast (Daliresp) 250 MICROGram(s) 250 MICROGram(s) Oral daily  senna 2 Tablet(s) Oral at bedtime  simethicone 80 milliGRAM(s) Chew once  theophylline ER Capsule 400 milliGRAM(s) Oral daily    MEDICATIONS  (PRN):  bisacodyl Suppository 10 milliGRAM(s) Rectal daily PRN Constipation  dextrose 40% Gel 15 Gram(s) Oral once PRN Blood Glucose LESS THAN 70 milliGRAM(s)/deciliter  glucagon  Injectable 1 milliGRAM(s) IntraMuscular once PRN Glucose LESS THAN 70 milligrams/deciliter  ondansetron Injectable 4 milliGRAM(s) IV Push every 8 hours PRN Nausea and/or Vomiting  sodium chloride 0.65% Nasal 1 Spray(s) Both Nostrils two times a day PRN Nasal Congestion      Vital Signs Last 24 Hrs  T(C): 36.6 (30 Dec 2018 04:40), Max: 36.9 (29 Dec 2018 14:21)  T(F): 97.8 (30 Dec 2018 04:40), Max: 98.4 (29 Dec 2018 14:21)  HR: 72 (30 Dec 2018 05:35) (72 - 98)  BP: 145/77 (30 Dec 2018 04:40) (115/71 - 145/77)  BP(mean): --  RR: 20 (30 Dec 2018 04:40) (20 - 20)  SpO2: 100% (30 Dec 2018 05:35) (100% - 100%)  CAPILLARY BLOOD GLUCOSE      POCT Blood Glucose.: 184 mg/dL (30 Dec 2018 08:36)  POCT Blood Glucose.: 217 mg/dL (29 Dec 2018 21:32)  POCT Blood Glucose.: 312 mg/dL (29 Dec 2018 17:27)  POCT Blood Glucose.: 176 mg/dL (29 Dec 2018 12:55)    I&O's Summary    29 Dec 2018 07:01  -  30 Dec 2018 07:00  --------------------------------------------------------  IN: 560 mL / OUT: 1300 mL / NET: -740 mL        PHYSICAL EXAM:  GENERAL: NAD, anicteric, afebrile  HEAD:  Atraumatic, Normocephalic  EYES: EOMI, PERRLA, conjunctiva and sclera clear  NECK: Supple, No JVD  CHEST/LUNG: Clear to auscultation bilaterally; No wheeze  HEART: Regular rate and rhythm; No murmurs, rubs, or gallops  ABDOMEN: Soft, Nontender, Nondistended; Bowel sounds present  EXTREMITIES: b/l UE /LE edema   PSYCH: AAOx3  NEUROLOGY: non-focal  SKIN: No rashes or lesions    LABS:                        10.3   7.00  )-----------( 151      ( 29 Dec 2018 07:47 )             32.9     12-29    138  |  88<L>  |  29<H>  ----------------------------<  182<H>  3.8   |  39<H>  |  1.11    Ca    9.6      29 Dec 2018 06:33

## 2018-12-30 NOTE — PROGRESS NOTE ADULT - ASSESSMENT
59 y/o F w/h/o controlled T2DM on Metformin 500mg bid. Also h/o CAD and COPD now on prednisone 20mg daily. Pt having elevated glucose levels consistently last 2 days (pre-dinner/bedtime). Will slowly titrate up insulin as it appears pt is eating more consistently and not having any more hypoglycemic episodes. Steroid dose unchanged for now. BG goal (100-180mg/dl).

## 2018-12-31 LAB
BASE EXCESS BLDA CALC-SCNC: 20.2 MMOL/L — HIGH (ref -2–2)
CO2 BLDA-SCNC: 51 MMOL/L — HIGH (ref 22–30)
GLUCOSE BLDC GLUCOMTR-MCNC: 183 MG/DL — HIGH (ref 70–99)
GLUCOSE BLDC GLUCOMTR-MCNC: 188 MG/DL — HIGH (ref 70–99)
GLUCOSE BLDC GLUCOMTR-MCNC: 227 MG/DL — HIGH (ref 70–99)
GLUCOSE BLDC GLUCOMTR-MCNC: 255 MG/DL — HIGH (ref 70–99)
HCO3 BLDA-SCNC: 48 MMOL/L — HIGH (ref 21–29)
PCO2 BLDA: 84 MMHG — CRITICAL HIGH (ref 32–46)
PH BLDA: 7.38 — SIGNIFICANT CHANGE UP (ref 7.35–7.45)
PO2 BLDA: 26 MMHG — CRITICAL LOW (ref 74–108)
SAO2 % BLDA: 41 % — LOW (ref 92–96)

## 2018-12-31 PROCEDURE — 99233 SBSQ HOSP IP/OBS HIGH 50: CPT | Mod: GC

## 2018-12-31 RX ORDER — INSULIN LISPRO 100/ML
4 VIAL (ML) SUBCUTANEOUS
Qty: 0 | Refills: 0 | Status: DISCONTINUED | OUTPATIENT
Start: 2018-12-31 | End: 2019-01-03

## 2018-12-31 RX ORDER — DOCUSATE SODIUM 100 MG
100 CAPSULE ORAL THREE TIMES A DAY
Qty: 0 | Refills: 0 | Status: DISCONTINUED | OUTPATIENT
Start: 2018-12-31 | End: 2019-01-15

## 2018-12-31 RX ADMIN — Medication 1 DROP(S): at 13:14

## 2018-12-31 RX ADMIN — SENNA PLUS 2 TABLET(S): 8.6 TABLET ORAL at 22:10

## 2018-12-31 RX ADMIN — Medication 0.5 MILLIGRAM(S): at 17:48

## 2018-12-31 RX ADMIN — Medication 400 MILLIGRAM(S): at 08:19

## 2018-12-31 RX ADMIN — Medication 3 MILLILITER(S): at 22:10

## 2018-12-31 RX ADMIN — Medication 0.5 MILLIGRAM(S): at 05:38

## 2018-12-31 RX ADMIN — MONTELUKAST 10 MILLIGRAM(S): 4 TABLET, CHEWABLE ORAL at 13:05

## 2018-12-31 RX ADMIN — Medication 81 MILLIGRAM(S): at 13:05

## 2018-12-31 RX ADMIN — Medication 20 MILLIGRAM(S): at 05:33

## 2018-12-31 RX ADMIN — Medication 4 UNIT(S): at 08:43

## 2018-12-31 RX ADMIN — Medication 500000 UNIT(S): at 22:09

## 2018-12-31 RX ADMIN — POLYETHYLENE GLYCOL 3350 17 GRAM(S): 17 POWDER, FOR SOLUTION ORAL at 13:05

## 2018-12-31 RX ADMIN — Medication 1 DROP(S): at 22:10

## 2018-12-31 RX ADMIN — Medication 40 MILLIGRAM(S): at 05:33

## 2018-12-31 RX ADMIN — Medication 1: at 18:14

## 2018-12-31 RX ADMIN — INSULIN GLARGINE 6 UNIT(S): 100 INJECTION, SOLUTION SUBCUTANEOUS at 22:10

## 2018-12-31 RX ADMIN — Medication 1 TABLET(S): at 13:13

## 2018-12-31 RX ADMIN — Medication 100 MILLIGRAM(S): at 13:05

## 2018-12-31 RX ADMIN — Medication 3 UNIT(S): at 18:14

## 2018-12-31 RX ADMIN — Medication 1: at 08:43

## 2018-12-31 RX ADMIN — Medication 3 MILLILITER(S): at 17:48

## 2018-12-31 RX ADMIN — Medication 500000 UNIT(S): at 13:05

## 2018-12-31 RX ADMIN — ISOSORBIDE MONONITRATE 30 MILLIGRAM(S): 60 TABLET, EXTENDED RELEASE ORAL at 13:05

## 2018-12-31 RX ADMIN — Medication 3: at 13:12

## 2018-12-31 RX ADMIN — Medication 500000 UNIT(S): at 05:32

## 2018-12-31 RX ADMIN — Medication 3 MILLILITER(S): at 10:24

## 2018-12-31 RX ADMIN — ENOXAPARIN SODIUM 40 MILLIGRAM(S): 100 INJECTION SUBCUTANEOUS at 22:10

## 2018-12-31 RX ADMIN — Medication 2000 UNIT(S): at 13:05

## 2018-12-31 RX ADMIN — Medication 1 DROP(S): at 05:33

## 2018-12-31 RX ADMIN — Medication 3 MILLILITER(S): at 14:04

## 2018-12-31 RX ADMIN — ONDANSETRON 4 MILLIGRAM(S): 8 TABLET, FILM COATED ORAL at 16:09

## 2018-12-31 RX ADMIN — Medication 4 UNIT(S): at 13:12

## 2018-12-31 RX ADMIN — Medication 3 MILLILITER(S): at 05:33

## 2018-12-31 RX ADMIN — Medication 100 MILLIGRAM(S): at 22:09

## 2018-12-31 RX ADMIN — PANTOPRAZOLE SODIUM 40 MILLIGRAM(S): 20 TABLET, DELAYED RELEASE ORAL at 05:33

## 2018-12-31 NOTE — PROGRESS NOTE ADULT - SUBJECTIVE AND OBJECTIVE BOX
[facility] - [facility]     PROGRESS NOTE    CHIEF COMPLAINT: chronic hypoxic/hypercapnic respiratory failure; COPD exacerbation; emphysema; dyspnea    INTERVAL HISTORY: on and off BIPAP during the night and day due to shortness of breath without hypoxemia; out of bed and into the chair; has not walked in some time; using a commode brought to her bedside or chair to go to the bathroom; no recent ABG; less leg and arm swelling on diuretics and reduced dose of steroids; resolved AURORA and hyponatremia; no cough, sputum production, chest congestion or wheeze; no fevers, chills or sweats; no chest pain/pressure or palpitations;    REVIEW OF SYSTEMS:  Constitutional: As per interval history  HEENT: Within normal limits  CV: As per interval history  Resp: As per interval history  GI: Within normal limits   : Within normal limits  Musculoskeletal: lower extremity weakness and pain  Skin: Within normal limits  Neurological: Within normal limits  Psychiatric: Within normal limits  Endocrine: Within normal limits  Hematologic/Lymphatic: Within normal limits  Allergic/Immunologic: Within normal limits      MEDICATIONS:     Pulmonary "  ALBUTerol/ipratropium for Nebulization 3 milliLiter(s) Nebulizer <User Schedule>  buDESOnide   0.5 milliGRAM(s) Respule 0.5 milliGRAM(s) Inhalation every 12 hours  montelukast 10 milliGRAM(s) Oral daily  theophylline ER Capsule 400 milliGRAM(s) Oral daily      Anti-microbials:  azithromycin   Tablet 250 milliGRAM(s) Oral <User Schedule>  nystatin    Suspension 188019 Unit(s) Oral four times a day      Cardiovascular:  furosemide    Tablet 40 milliGRAM(s) Oral daily  isosorbide   mononitrate ER Tablet (IMDUR) 30 milliGRAM(s) Oral daily      Other:  artificial tears (preservative free) Ophthalmic Solution 1 Drop(s) Both EYES three times a day  aspirin enteric coated 81 milliGRAM(s) Oral daily  Biotene Dry Mouth Oral Rinse 5 milliLiter(s) Swish and Spit two times a day  bisacodyl Suppository 10 milliGRAM(s) Rectal daily PRN  cholecalciferol 2000 Unit(s) Oral daily  dextrose 40% Gel 15 Gram(s) Oral once PRN  dextrose 5%. 1000 milliLiter(s) IV Continuous <Continuous>  dextrose 50% Injectable 12.5 Gram(s) IV Push once  dextrose 50% Injectable 25 Gram(s) IV Push once  dextrose 50% Injectable 25 Gram(s) IV Push once  docusate sodium 100 milliGRAM(s) Oral daily  enoxaparin Injectable 40 milliGRAM(s) SubCutaneous every 24 hours  glucagon  Injectable 1 milliGRAM(s) IntraMuscular once PRN  insulin glargine Injectable (LANTUS) 6 Unit(s) SubCutaneous at bedtime  insulin lispro (HumaLOG) corrective regimen sliding scale   SubCutaneous three times a day before meals  insulin lispro (HumaLOG) corrective regimen sliding scale   SubCutaneous at bedtime  insulin lispro Injectable (HumaLOG) 4 Unit(s) SubCutaneous before breakfast  insulin lispro Injectable (HumaLOG) 3 Unit(s) SubCutaneous with dinner  insulin lispro Injectable (HumaLOG) 4 Unit(s) SubCutaneous before lunch  melatonin 1 milliGRAM(s) Oral at bedtime  multivitamin 1 Tablet(s) Oral daily  ondansetron Injectable 4 milliGRAM(s) IV Push every 8 hours PRN  pantoprazole    Tablet 40 milliGRAM(s) Oral before breakfast  polyethylene glycol 3350 17 Gram(s) Oral daily  predniSONE   Tablet 20 milliGRAM(s) Oral daily  Roflumilast (Daliresp) 250 MICROGram(s) 250 MICROGram(s) Oral daily  senna 2 Tablet(s) Oral at bedtime  simethicone 80 milliGRAM(s) Chew once  sodium chloride 0.65% Nasal 1 Spray(s) Both Nostrils two times a day PRN        OBJECTIVE:    I&O's Detail    30 Dec 2018 07:01  -  31 Dec 2018 07:00  --------------------------------------------------------  IN:    Oral Fluid: 1320 mL  Total IN: 1320 mL    OUT:    Voided: 1800 mL  Total OUT: 1800 mL    Total NET: -480 mL      31 Dec 2018 07:01  -  31 Dec 2018 12:51  --------------------------------------------------------  IN:    Oral Fluid: 240 mL  Total IN: 240 mL    OUT:  Total OUT: 0 mL    Total NET: 240 mL    POCT Blood Glucose.: 188 mg/dL (31 Dec 2018 08:33)  POCT Blood Glucose.: 180 mg/dL (30 Dec 2018 21:57)  POCT Blood Glucose.: 123 mg/dL (30 Dec 2018 19:53)  POCT Blood Glucose.: 77 mg/dL (30 Dec 2018 17:37)      PHYSICAL EXAM:       ICU Vital Signs Last 24 Hrs  T(C): 36.6 (31 Dec 2018 05:29), Max: 36.6 (31 Dec 2018 05:29)  T(F): 97.8 (31 Dec 2018 05:29), Max: 97.8 (31 Dec 2018 05:29)  HR: 88 (31 Dec 2018 11:54) (84 - 107)  BP: 143/85 (31 Dec 2018 05:29) (109/67 - 143/85)  BP(mean): --  ABP: --  ABP(mean): --  RR: 18 (31 Dec 2018 05:29) (18 - 20)  SpO2: 100% (31 Dec 2018 11:54) (100% - 100%) 5lpm nasal canula     General: Awake. Alert. Cooperative. No distress. Appears stated age. Obese.   HEENT:  Atraumatic. Normocephalic. Anicteric. Normal oral mucosa. PERRL. EOMI.   Neck: Supple. Trachea midline. Thyroid without enlargement/tenderness/nodules. No carotid bruit. No JVD.	  Cardiovascular: Regular rate and rhythm. Distant S1 S2. No murmurs, rubs or gallops.  Respiratory: Respirations unlabored. Markedly decreased breath sounds throughout. Prolonged expiratory phase of respiration. No wheeze. No curvature.  Abdomen: Soft. Non-tender. Non-distended. No organomegaly. No masses. Normal bowel sounds. Obese.  Extremities: Warm to touch. No clubbing or cyanosis. Mild bilateral lower extremity edema up to the thigh. Mild bilateral upper extremity swelling.  Pulses: Decreased lower extremity peripheral pulses.  Skin: No rashes or lesions. No ecchymoses. No cyanosis. Warm to touch. Multiple upper extremity excoriations  Lymph Nodes: Cervical, supraclavicular and axillary nodes normal  Neurological: Motor and sensory examination equal and normal. A and O x 3  Psychiatry: Appropriate mood and affect.     LABS:                        10.2   6.81  )-----------( 167      ( 30 Dec 2018 12:21 )             32.8     12-29    138  |  88<L>  |  29<H>  ----------------------------<  182<H>  3.8   |  39<H>  |  1.11    12-28    138  |  87<L>  |  30<H>  ----------------------------<  142<H>  4.6   |  41<H>  |  0.98    Ca      9.6      12-29    Ca      9.6      12-28      ABG - ( 18 Dec 2018 13:23 )  pH: 7.46  /  pCO2: 47    /  pO2: 88    / HCO3: 33    / Base Excess: 8.0   /  SaO2: 97        ABG - ( 16 Dec 2018 20:53 )  pH: 7.44  /  pCO2: 53    /  pO2: 173   / HCO3: 36    / Base Excess: 10.0  /  SaO2: 100       ABG - ( 13 Dec 2018 06:29 )  pH: 7.30  /  pCO2: 71    /  pO2: 182   / HCO3: 34    / Base Excess: 5.8   /  SaO2: 99        ABG - ( 13 Dec 2018 00:59 )  pH: 7.32  /  pCO2: 71    /  pO2: 75    / HCO3: 35    / Base Excess: 7.1   /  SaO2: 95        ABG - ( 11 Dec 2018 11:45 )  pH: 7.39  /  pCO2: 54    /  pO2: 98    / HCO3: 32    / Base Excess: 6.2   /  SaO2: 98        ABG - ( 09 Dec 2018 11:26 )  pH: 7.35  /  pCO2: 63    /  pO2: 145   / HCO3: 34    / Base Excess: 6.6   /  SaO2: 99      ABG - ( 09 Dec 2018 00:28 )  pH: 7.32  /  pCO2: 71    /  pO2: 124   / HCO3: 36    / Base Excess: 7.6   /  SaO2: 99        ABG - ( 08 Dec 2018 20:50 )  pH: 7.32  /  pCO2: 72    /  pO2: 80    / HCO3: 36    / Base Excess: 7.7   /  SaO2: 95        Serum Pro-Brain Natriuretic Peptide: 254 pg/mL (12-13 @ 14:00)    < from: Transthoracic Echocardiogram (12.07.18 @ 08:59) >    Patient name: GUY HARTMAN  YOB: 1960   Age: 58 (F)   MR#: [id number]  Study Date: 12/7/2018  Location: [address] [place], [place] [id number]: [facility]  Study quality: Technically good  Referring Physician: Allen ingram MD  BloodPressure: 120/80 mmHg  Height: 163 cm  Weight: 88 kg  BSA: 1.9 m2  ------------------------------------------------------------------------  PROCEDURE: Transthoracic echocardiogram with 2-D, M-Mode  and complete spectral and color flow Doppler.  INDICATION: Other forms of dyspnea (R06.09)  ------------------------------------------------------------------------  Dimensions:    Normal Values:  LA:     3.6    2.0 - 4.0 cm  Ao:     2.9    2.0 - 3.8 cm  SEPTUM: 0.9    0.6 - 1.2 cm  PWT:    0.8    0.6- 1.1 cm  LVIDd:  4.9    3.0 - 5.6 cm  LVIDs:  2.9    1.8 - 4.0 cm  Derived variables:  LVMI: 73 g/m2  RWT: 0.32  Fractional short: 40 %  EF (Teicholtz): 71 %  Doppler Peak Velocity (m/sec): AoV=1.2  ------------------------------------------------------------------------  Observations:  Mitral Valve: Normal mitral valve.  Aortic Valve/Aorta: Normal trileaflet aortic valve. Peak  transaortic valve gradient equals 6 mm Hg, mean transaortic  valve gradient equals 3 mm Hg, aortic valve velocity time  integral equals 22 cm. Trace aortic regurgitation.  Aortic Root: 2.9 cm.  Left Atrium: Normal left atrium.  LA volume index = 18  cc/m2.  Left Ventricle: Normal left ventricular systolic function.  No segmental wall motion abnormalities. Normal left  ventricular internal dimensions and wall thicknesses. Mild  diastolic dysfunction (Stage I).  Right Heart: Normal right atrium. Normal right ventricular  size and function. Normal tricuspid valve. Minimal  tricuspid regurgitation. Normal pulmonic valve.  Pericardium/Pleura: Normal pericardium with no pericardial  effusion.  Hemodynamic: Estimated right atrial pressure is 8 mm Hg.  Inadequate tricuspid regurgitation Doppler envelope  precludes estimation of RVSP.  ------------------------------------------------------------------------  Conclusions:  1. Normal mitral valve.  2. Normal trileaflet aortic valve. Peak transaortic valve  gradient equals 6 mm Hg, mean transaortic valve gradient  equals 3 mm Hg, aortic valve velocity time integral equals  22 cm. Trace aortic regurgitation.  3. Aortic Root: 2.9 cm.  4. Normal left atrium.  LA volume index = 18 cc/m2.  5. Normal left ventricular internal dimensions and wall  thicknesses.  6. Normal left ventricular systolic function. No segmental  wall motion abnormalities.  7. Mild diastolic dysfunction (Stage I).  8. Normal right ventricular size and function.  9. Inadequate tricuspid regurgitation Doppler envelope  precludes estimation of RVSP.  10. Normal tricuspid valve. Minimal tricuspid  regurgitation.  *** Compared with echocardiogram report of 2/18/2014, no  significant changes noted.  ------------------------------------------------------------------------  Confirmed on  12/7/2018 - 13:49:43 by Shefali Gómez M.D.  ------------------------------------------------------------------------    < end of copied text >  ---------------------------------------------------------------------------------------------------------------    MICROBIOLOGY:     Culture - Sputum . (12.07.18 @ 08:34)    Gram Stain:   Rare polymorphonuclear leukocytes per low power field  Rare Squamous epithelial cells per low power field  Numerous Gram Positive Cocci in Pairs and Chains per oil power field    Specimen Source: .Sputum Sputum    Culture Results:   Normal Respiratory Samaria present    Culture - Sputum . (12.05.18 @ 22:50)    Gram Stain:   Moderate polymorphonuclear leukocytes per low power field  Few Squamous epithelial cells per low power field  Moderate Gram Positive Cocci in Pairs and Chains per oil power field    Specimen Source: .Sputum Sputum    Culture Results:   Normal Respiratory Samaria present    Rapid Respiratory Viral Panel (11.30.18 @ 01:30)    Rapid RVP Result: NotDete: The [employer] RVP Rapid uses polymerase chain reaction (PCR) and melt  curve analysis to screen for adenovirus; coronavirus HKU1, NL63, 229E,  OC43; human metapneumovirus (hMPV); human enterovirus/rhinovirus  (Entero/RV); influenza A; influenza A/H1;influenza A/H3; influenza  A/H1-2009; influenza B; parainfluenza viruses 1, 2, 3, 4; respiratory  syncytial virus; Bordetella pertussis; Mycoplasma pneumoniae; and  Chlamydophila pneumoniae.    RADIOLOGY:  [x] Chest radiographs reviewed and interpreted by me    < from: [facility] Duplex Lower Ext Vein Scan, Bilat (12.30.18 @ 19:06) >    EXAM:  DUPLEX SCAN EXT VEINS LOWER BI                          PROCEDURE DATE:  12/30/2018      INTERPRETATION:  Clinical indication: Worsening edema.    Technique:  Grayscale, color Doppler and spectral Doppler ultrasound was   utilized toevaluate bilateral lower extremity deep venous system.      Comparison: 12/6/2018.    Findings: There is no thrombosis in bilateral common femoral veins,   femoral veins or popliteal veins. Visualized calf veins are patent. There   is bilateral lowerextremity soft tissue edema.    Impression:     No evidence of deep vein thrombosis in either lower extremity.    BROCK TOBIN M.D., ATTENDING RADIOLOGIST  This document has been electronically signed. Dec 30 2018  7:24PM     < end of copied text >  ---------------------------------------------------------------------------------------------------------------  < from: [facility] Duplex Upper Ext Vein Scan, Darius (12.30.18 @ 19:05) >    EXAM:  DUPLEX SCAN EXT VEINS UPPER BI                          PROCEDURE DATE:  12/30/2018      INTERPRETATION:  Clinical information: Worsening bilateral upper   extremity edema.    Findings: Duplex evaluation of the deep venous system of the right and   left upper extremity was performed to include the brachiocephalic,   internal jugular, subclavian, axillary, brachial, radial and ulnar veins.   No echogenic thrombus is seen within the vein lumina. Complete coaptation   of those segments of vein accessible to compression was achieved.   Spontaneous venous flow with respiratory and cardiac pulsatility is noted   throughout.     The right innominate vein is patent. The left innominate vein was not   visualized.     The right and left basilic and cephalic veins are patent and compressible.    Impression: No duplex evidence of DVT in the right and left upper   extremity.    RAYMUNDO DUMONT M.D., ATTENDING RADIOLOGIST  This document has been electronically signed. Dec 31 2018  8:49AM     < end of copied text >  ---------------------------------------------------------------------------------------------------------------  < from: Xray Chest 1 View AP/PA (12.25.18 @ 18:04) >    EXAM:  XR CHEST AP OR PA 1V                          PROCEDURE DATE:  12/25/2018      INTERPRETATION:  CLINICAL INFORMATION: Shortness of breath. History of   COPD.    TECHNIQUE: AP view of the chest 12/25/2018.    COMPARISON: Chest radiograph 12/13/2018.     FINDINGS:     The lungs are clear. There is no pneumothorax and no pleural effusions.   Cardiac size is not accurately evaluated in this projection.  The visualized osseous structures demonstrate no acute abnormality.    IMPRESSION:   Clear lungs.    SUSANA HODGES M.D., RADIOLOGY RESIDENT  This document has been electronically signed.  SVETLANA SANDOVAL M.D., ATTENDING RADIOLOGIST  This document has been electronically signed. Dec 26 2018 10:59AM      < end of copied text >  ---------------------------------------------------------------------------------------------------------------  < from: CT Angio Chest w/ IV Cont (12.04.18 @ 16:30) >    EXAM:  CT ANGIO CHEST (W)AW IC                          PROCEDURE DATE:  12/04/2018      INTERPRETATION:  CT CHEST WITH CONTRAST    INDICATION: Known COPD not responding to steroids and bronchodilators.   Progressively worsening dyspnea. Evaluate for pulmonary embolism.    TECHNIQUE: Enhanced helical images were obtained of the chest. Coronal   and sagittal images were reconstructed. Maximum intensity projection   images were generated. Images were obtained after the uneventful   administration of 60 cc nonionic intravenous Omnipaque 350.  40 cc of   Omnipaque 350 was discarded.    COMPARISON: CT chest 2/18/2014.    FINDINGS:     Lungs And Airways: Emphysematous changes of the lung.      The airways are unremarkable.      Pleura: No pneumothorax. No pleural effusion.    Mediastinum: There are no enlarged chest lymph nodes. The visualized   portion of the thyroid gland is unremarkable.       Heart and Vasculature: No pulmonary embolism.    The main pulmonary artery is normal in caliber. No thoracic aortic   aneurysm or dissection. Atheromatous disease of the aorta.    The heart is normal in size.  There is no pericardial effusion.   Coronary artery disease with calcified plaque involving the right and   left main coronary arteries and the left anterior descending artery.      Upper Abdomen: The upper abdomen is unremarkable.    Bones And Soft Tissues: Degenerative changes of the spine.  The soft   tissues are unremarkable.      IMPRESSION:   1.  No pulmonary embolism.  2. Emphysematous changes of the lung.    DIANA MEDINA M.D., RADIOLOGY RESIDENT  This document has been electronically signed.  JEYSON SANCHEZ M.D., ATTENDING RADIOLOGIST  This document has been electronically signed. Dec  4 2018  5:21PM      < end of copied text >  ---------------------------------------------------------------------------------------------------------------  SPIROMETRY:     FEV1 0.56 liters - 22% predicted  FVC 1.73 liters - 52% predicted  FEV1% 32    c/w very severe obstructive lung disease

## 2018-12-31 NOTE — PROGRESS NOTE ADULT - PROBLEM SELECTOR PLAN 6
Dopplers negative for DVT.  Echo report noted, mild diastolic dysfunction, no significant changes from prior study in 2014.  Suspect leg edema may be related to recent high dose steroids.  UE dopplers previously negative, pt with increase LUE edema, will check dopplers r/o dvt   lasix 40mg qd. Dopplers negative for DVT.  Echo report noted, mild diastolic dysfunction, no significant changes from prior study in 2014.  Suspect leg edema may be related to recent high dose steroids.  UE dopplers previously negative, pt with increase LUE edema, Repeat dopplers negative for DVT, edema likely due to steroids  lasix 40mg qd.

## 2018-12-31 NOTE — PROGRESS NOTE ADULT - ASSESSMENT
ASSESSMENT:    ongoing dyspnea with minimal exertion with chronic hypoxic and hypercapnic respiratory failure due to very severe COPD with "exacerbation" - adequate oxygenation on a nasal canula in the setting of profound dyspnea suggests that alterations in the mechanics of breathing due to lung hyperinflation and obesity are playing a significant role in her shortness of breath - there is evidence of CAD without pulmonary hypertension on the CT scan which is without pulmonary emboli or pneumonia - cardiac catheterization last year revealed patent stents - ECHO has a preserved LVEF, no significant valvular heart disease and no pulmonary hypertension - resolved AURORA, hyperkalemia and hyponatremia - resolved respiratory acidosis with BIPAP support on ABG some time ago - lower extremity weakness and pain 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 perhaps exacerbated by statin use    extremity swelling likely related to steroid induced fluid retention and nocturnal hypoxemia with worsening of elevated pulmonary artery pressures - no evidence of DVT on repeat upper or lower extremity Duplex examination    HTN/HLD/DM    CAD s/p PCI    PAD    PLAN/RECOMMENDATIONS:    BIPAP 12/5 with 40% FiO2 for sleep - on and off during the day for increased work of breathing or recurrent respiratory acidosis   oxygen supplementation to keep saturation greater than 92% using a nasal canula when off BIPAP  repeat ABG today  sputum culture - no growth - has completed a course of biaxin  home trilogy vent has been arranged with [facility]   albuterol/atrovent nebs q4h holding 2AM dose  pulmicort 0.5mg nebs q12h  singulair/theophylline/daliresp - theophylline level is subtherapeutic  prednisone 20mg daily - glucose control - nystatin - continue steroid taper given lack of improvement with systemic steroids and likely steroid related side effects  azithromycin TIW for antiinflammatory effects  cardiology evaluation noted  cardiac meds: ASA/imdur/lasix - off crestor with possible myopathy - off norvasc which is perhaps exacerbating swelling - BP control  diurese as tolerated by renal function and hemodynamics - watch for worsening metabolic alkalosis with loop diuretic use which could lead to worsening hypercapnia  GI/DVT prophylaxis - protoix/SQ lovenox  physical therapy called to bring exercise bands and exercise instructions that the patient can use on her own  renal and endocrine evaluation noted  bowel regimen  TOMAS stockings  transfer to acute rehab when able to ambulate with assistance  outpatient evaluation for lung transplantation    Will follow with you. Plan of care discussed with the patient at bedside, physical therapy and the .    David Fair MD, [facility] - [phone]  Pulmonary Medicine

## 2018-12-31 NOTE — PROGRESS NOTE ADULT - ASSESSMENT
58-year-old woman with PMH of COPD on home O2 3L, HTN, HLD, CAD s/p 6 stents, Type 2 DM, PVD, p/w progressive worsening dyspnea x 2 weeks c/w COPD exacerbation. 58-year-old woman with PMH of COPD on home O2 3L, HTN, HLD, CAD s/p 6 stents, Type 2 DM, PVD, p/w progressive worsening dyspnea x 2 weeks a/w COPD exacerbation and now with persistent chronic hypoxic resp failure requiring intermittent bipap.

## 2018-12-31 NOTE — PROGRESS NOTE ADULT - PROBLEM SELECTOR PLAN 1
Plan: c/w Prednisone 20mg po qd   Continue Pulmicort, Duoneb ATC   c/w Theophylline, Singulair.  Completed 7 days of biaxin   Home Trilogy vent arranged by pulmonary.  c/w intermittent bipap ; alternating with 5L NC  C/w daliresp  Pulm follow up with [place]/transplant list  c/w azithromycin   D/w pulmonary plan for discharge to acute pulmonary rehab  Appreciate pm&r recs. Plan: c/w Prednisone 20mg po qd, per pulm, can likely begin very slow taper  Continue Pulmicort, Duoneb ATC   c/w Theophylline, Singulair.  Completed 7 days of biaxin   Home Trilogy vent arranged by pulmonary.  c/w intermittent bipap ; alternating with 5L NC  C/w daliresp  Pulm follow up with [place]/transplant list as outpatient but for now plan for d/c to acute pulm rehab  c/w azithromycin   D/w pulmonary plan for discharge to acute pulmonary rehab  Appreciate pm&r recs.

## 2018-12-31 NOTE — PROGRESS NOTE ADULT - SUBJECTIVE AND OBJECTIVE BOX
CARDIOLOGY FOLLOW UP - Dr. Brunson    CC no cp/sob  resting comfortably in chair on nasal cannula       PHYSICAL EXAM:  T(C): 36.6 (12-31-18 @ 05:29), Max: 36.6 (12-30-18 @ 12:03)  HR: 85 (12-31-18 @ 05:50) (84 - 107)  BP: 143/85 (12-31-18 @ 05:29) (109/67 - 143/85)  RR: 18 (12-31-18 @ 05:29) (18 - 20)  SpO2: 100% (12-31-18 @ 05:50) (100% - 100%)  Wt(kg): --  I&O's Summary    30 Dec 2018 07:01  -  31 Dec 2018 07:00  --------------------------------------------------------  IN: 1320 mL / OUT: 1800 mL / NET: -480 mL        Appearance: Normal	  Cardiovascular: Normal S1 S2,RRR, No JVD, No murmurs  Respiratory: diminished   Gastrointestinal:  Soft, Non-tender, + BS	  Extremities: Normal range of motion, No clubbing, cyanosis or edema        MEDICATIONS  (STANDING):  ALBUTerol/ipratropium for Nebulization 3 milliLiter(s) Nebulizer <User Schedule>  artificial tears (preservative free) Ophthalmic Solution 1 Drop(s) Both EYES three times a day  aspirin enteric coated 81 milliGRAM(s) Oral daily  azithromycin   Tablet 250 milliGRAM(s) Oral <User Schedule>  Biotene Dry Mouth Oral Rinse 5 milliLiter(s) Swish and Spit two times a day  buDESOnide   0.5 milliGRAM(s) Respule 0.5 milliGRAM(s) Inhalation every 12 hours  cholecalciferol 2000 Unit(s) Oral daily  dextrose 5%. 1000 milliLiter(s) (50 mL/Hr) IV Continuous <Continuous>  dextrose 50% Injectable 12.5 Gram(s) IV Push once  dextrose 50% Injectable 25 Gram(s) IV Push once  dextrose 50% Injectable 25 Gram(s) IV Push once  docusate sodium 100 milliGRAM(s) Oral daily  enoxaparin Injectable 40 milliGRAM(s) SubCutaneous every 24 hours  furosemide    Tablet 40 milliGRAM(s) Oral daily  insulin glargine Injectable (LANTUS) 6 Unit(s) SubCutaneous at bedtime  insulin lispro (HumaLOG) corrective regimen sliding scale   SubCutaneous three times a day before meals  insulin lispro (HumaLOG) corrective regimen sliding scale   SubCutaneous at bedtime  insulin lispro Injectable (HumaLOG) 4 Unit(s) SubCutaneous before breakfast  insulin lispro Injectable (HumaLOG) 3 Unit(s) SubCutaneous with dinner  insulin lispro Injectable (HumaLOG) 5 Unit(s) SubCutaneous before lunch  isosorbide   mononitrate ER Tablet (IMDUR) 30 milliGRAM(s) Oral daily  melatonin 1 milliGRAM(s) Oral at bedtime  montelukast 10 milliGRAM(s) Oral daily  multivitamin 1 Tablet(s) Oral daily  nystatin    Suspension 755683 Unit(s) Oral four times a day  pantoprazole    Tablet 40 milliGRAM(s) Oral before breakfast  polyethylene glycol 3350 17 Gram(s) Oral daily  predniSONE   Tablet 20 milliGRAM(s) Oral daily  Roflumilast (Daliresp) 250 MICROGram(s) 250 MICROGram(s) Oral daily  senna 2 Tablet(s) Oral at bedtime  simethicone 80 milliGRAM(s) Chew once  theophylline ER Capsule 400 milliGRAM(s) Oral daily      TELEMETRY: 	    ECG:  	  RADIOLOGY:   DIAGNOSTIC TESTING:  [ ] Echocardiogram:  [ ]  Catheterization:  [ ] Stress Test:    OTHER: 	    LABS:	 	                                10.2   6.81  )-----------( 167      ( 30 Dec 2018 12:21 )             32.8

## 2018-12-31 NOTE — PROGRESS NOTE ADULT - SUBJECTIVE AND OBJECTIVE BOX
Chief Complain:Patient is a 58y old  Female who presents with a chief complaint of dyspnea    SUBJECTIVE / OVERNIGHT EVENTS:  Patient seen and examined at bedside, reports of swelling in the feet B/L, Denies any pain. BIPAP in place.     Review of Systems:  14 point ROS negative in detail except for the above:     MEDICATIONS  (STANDING):  ALBUTerol/ipratropium for Nebulization 3 milliLiter(s) Nebulizer <User Schedule>  artificial tears (preservative free) Ophthalmic Solution 1 Drop(s) Both EYES three times a day  aspirin enteric coated 81 milliGRAM(s) Oral daily  azithromycin   Tablet 250 milliGRAM(s) Oral <User Schedule>  Biotene Dry Mouth Oral Rinse 5 milliLiter(s) Swish and Spit two times a day  buDESOnide   0.5 milliGRAM(s) Respule 0.5 milliGRAM(s) Inhalation every 12 hours  cholecalciferol 2000 Unit(s) Oral daily  dextrose 5%. 1000 milliLiter(s) (50 mL/Hr) IV Continuous <Continuous>  dextrose 50% Injectable 12.5 Gram(s) IV Push once  dextrose 50% Injectable 25 Gram(s) IV Push once  dextrose 50% Injectable 25 Gram(s) IV Push once  docusate sodium 100 milliGRAM(s) Oral three times a day  enoxaparin Injectable 40 milliGRAM(s) SubCutaneous every 24 hours  furosemide    Tablet 40 milliGRAM(s) Oral daily  insulin glargine Injectable (LANTUS) 6 Unit(s) SubCutaneous at bedtime  insulin lispro (HumaLOG) corrective regimen sliding scale   SubCutaneous three times a day before meals  insulin lispro (HumaLOG) corrective regimen sliding scale   SubCutaneous at bedtime  insulin lispro Injectable (HumaLOG) 4 Unit(s) SubCutaneous before breakfast  insulin lispro Injectable (HumaLOG) 3 Unit(s) SubCutaneous with dinner  insulin lispro Injectable (HumaLOG) 4 Unit(s) SubCutaneous before lunch  isosorbide   mononitrate ER Tablet (IMDUR) 30 milliGRAM(s) Oral daily  melatonin 1 milliGRAM(s) Oral at bedtime  montelukast 10 milliGRAM(s) Oral daily  multivitamin 1 Tablet(s) Oral daily  nystatin    Suspension 425337 Unit(s) Oral four times a day  pantoprazole    Tablet 40 milliGRAM(s) Oral before breakfast  polyethylene glycol 3350 17 Gram(s) Oral daily  predniSONE   Tablet 20 milliGRAM(s) Oral daily  Roflumilast (Daliresp) 250 MICROGram(s) 250 MICROGram(s) Oral daily  senna 2 Tablet(s) Oral at bedtime  simethicone 80 milliGRAM(s) Chew once  theophylline ER Capsule 400 milliGRAM(s) Oral daily    MEDICATIONS  (PRN):  bisacodyl Suppository 10 milliGRAM(s) Rectal daily PRN Constipation  dextrose 40% Gel 15 Gram(s) Oral once PRN Blood Glucose LESS THAN 70 milliGRAM(s)/deciliter  glucagon  Injectable 1 milliGRAM(s) IntraMuscular once PRN Glucose LESS THAN 70 milligrams/deciliter  ondansetron Injectable 4 milliGRAM(s) IV Push every 8 hours PRN Nausea and/or Vomiting  sodium chloride 0.65% Nasal 1 Spray(s) Both Nostrils two times a day PRN Nasal Congestion      T(C): 36.6 (12-31-18 @ 13:08), Max: 36.6 (12-31-18 @ 05:29)  HR: 95 (12-31-18 @ 13:08) (84 - 107)  BP: 143/83 (12-31-18 @ 13:08) (109/67 - 143/85)  RR: 18 (12-31-18 @ 13:08) (18 - 20)  SpO2: 100% (12-31-18 @ 13:08) (100% - 100%)    CAPILLARY BLOOD GLUCOSE      POCT Blood Glucose.: 255 mg/dL (31 Dec 2018 13:03)  POCT Blood Glucose.: 188 mg/dL (31 Dec 2018 08:33)  POCT Blood Glucose.: 180 mg/dL (30 Dec 2018 21:57)  POCT Blood Glucose.: 123 mg/dL (30 Dec 2018 19:53)  POCT Blood Glucose.: 77 mg/dL (30 Dec 2018 17:37)      I&O's Summary    30 Dec 2018 07:01  -  31 Dec 2018 07:00  --------------------------------------------------------  IN: 1320 mL / OUT: 1800 mL / NET: -480 mL    31 Dec 2018 07:01  -  31 Dec 2018 13:36  --------------------------------------------------------  IN: 240 mL / OUT: 0 mL / NET: 240 mL        Allergies    No Known Allergies    Intolerances        PHYSICAL EXAM:  GENERAL: Not in distress, well-developed  HEAD:  Atraumatic, Normocephalic  EYES: EOMI, PERRLA, conjunctiva and sclera clear  NECK: Supple, No JVD  CHEST/LUNG: Clear to auscultation bilaterally; No wheeze  HEART: Regular rate and rhythm; No murmurs, rubs, or edema  ABDOMEN: Soft, Nontender, Nondistended; Bowel sounds present  EXTREMITIES: Swelling in the feet B/L  PSYCH: Normal mood and affect, alert and oriented  NEUROLOGY: Non Focal  SKIN: No rashes or lesions    LABS:                        10.2   6.81  )-----------( 167      ( 30 Dec 2018 12:21 )             32.8                     Blood Cultures            RADIOLOGY & ADDITIONAL TESTS:    Imaging:    EKG/Telemetry:    Consultant(s) Notes Reviewed:      Care Discussed with Consultants/Other Providers:    The above recommendations were discussed with the patient/family. The patient/family had all questions answered and expressed understanding of the plan. Chief Complain:Patient is a 58y old  Female who presents with a chief complaint of dyspnea    SUBJECTIVE / OVERNIGHT EVENTS:  Patient seen and examined at bedside, reports of swelling in the feet B/L, Denies any pain. BIPAP in place.     Review of Systems:  14 point ROS negative in detail except for the above:     MEDICATIONS  (STANDING):  ALBUTerol/ipratropium for Nebulization 3 milliLiter(s) Nebulizer <User Schedule>  artificial tears (preservative free) Ophthalmic Solution 1 Drop(s) Both EYES three times a day  aspirin enteric coated 81 milliGRAM(s) Oral daily  azithromycin   Tablet 250 milliGRAM(s) Oral <User Schedule>  Biotene Dry Mouth Oral Rinse 5 milliLiter(s) Swish and Spit two times a day  buDESOnide   0.5 milliGRAM(s) Respule 0.5 milliGRAM(s) Inhalation every 12 hours  cholecalciferol 2000 Unit(s) Oral daily  dextrose 5%. 1000 milliLiter(s) (50 mL/Hr) IV Continuous <Continuous>  dextrose 50% Injectable 12.5 Gram(s) IV Push once  dextrose 50% Injectable 25 Gram(s) IV Push once  dextrose 50% Injectable 25 Gram(s) IV Push once  docusate sodium 100 milliGRAM(s) Oral three times a day  enoxaparin Injectable 40 milliGRAM(s) SubCutaneous every 24 hours  furosemide    Tablet 40 milliGRAM(s) Oral daily  insulin glargine Injectable (LANTUS) 6 Unit(s) SubCutaneous at bedtime  insulin lispro (HumaLOG) corrective regimen sliding scale   SubCutaneous three times a day before meals  insulin lispro (HumaLOG) corrective regimen sliding scale   SubCutaneous at bedtime  insulin lispro Injectable (HumaLOG) 4 Unit(s) SubCutaneous before breakfast  insulin lispro Injectable (HumaLOG) 3 Unit(s) SubCutaneous with dinner  insulin lispro Injectable (HumaLOG) 4 Unit(s) SubCutaneous before lunch  isosorbide   mononitrate ER Tablet (IMDUR) 30 milliGRAM(s) Oral daily  melatonin 1 milliGRAM(s) Oral at bedtime  montelukast 10 milliGRAM(s) Oral daily  multivitamin 1 Tablet(s) Oral daily  nystatin    Suspension 825252 Unit(s) Oral four times a day  pantoprazole    Tablet 40 milliGRAM(s) Oral before breakfast  polyethylene glycol 3350 17 Gram(s) Oral daily  predniSONE   Tablet 20 milliGRAM(s) Oral daily  Roflumilast (Daliresp) 250 MICROGram(s) 250 MICROGram(s) Oral daily  senna 2 Tablet(s) Oral at bedtime  simethicone 80 milliGRAM(s) Chew once  theophylline ER Capsule 400 milliGRAM(s) Oral daily    MEDICATIONS  (PRN):  bisacodyl Suppository 10 milliGRAM(s) Rectal daily PRN Constipation  dextrose 40% Gel 15 Gram(s) Oral once PRN Blood Glucose LESS THAN 70 milliGRAM(s)/deciliter  glucagon  Injectable 1 milliGRAM(s) IntraMuscular once PRN Glucose LESS THAN 70 milligrams/deciliter  ondansetron Injectable 4 milliGRAM(s) IV Push every 8 hours PRN Nausea and/or Vomiting  sodium chloride 0.65% Nasal 1 Spray(s) Both Nostrils two times a day PRN Nasal Congestion      T(C): 36.6 (12-31-18 @ 13:08), Max: 36.6 (12-31-18 @ 05:29)  HR: 95 (12-31-18 @ 13:08) (84 - 107)  BP: 143/83 (12-31-18 @ 13:08) (109/67 - 143/85)  RR: 18 (12-31-18 @ 13:08) (18 - 20)  SpO2: 100% (12-31-18 @ 13:08) (100% - 100%)    CAPILLARY BLOOD GLUCOSE      POCT Blood Glucose.: 255 mg/dL (31 Dec 2018 13:03)  POCT Blood Glucose.: 188 mg/dL (31 Dec 2018 08:33)  POCT Blood Glucose.: 180 mg/dL (30 Dec 2018 21:57)  POCT Blood Glucose.: 123 mg/dL (30 Dec 2018 19:53)  POCT Blood Glucose.: 77 mg/dL (30 Dec 2018 17:37)      I&O's Summary    30 Dec 2018 07:01  -  31 Dec 2018 07:00  --------------------------------------------------------  IN: 1320 mL / OUT: 1800 mL / NET: -480 mL    31 Dec 2018 07:01  -  31 Dec 2018 13:36  --------------------------------------------------------  IN: 240 mL / OUT: 0 mL / NET: 240 mL        Allergies    No Known Allergies    Intolerances        PHYSICAL EXAM:  GENERAL: Not in distress  HEAD:  Atraumatic, Normocephalic  EYES: EOMI, PERRLA, conjunctiva and sclera clear  NECK: Supple, No JVD  CHEST/LUNG: Clear to auscultation bilaterally; No wheeze  HEART: Regular rate and rhythm; No murmurs, rubs, or edema  ABDOMEN: Soft, Nontender, Nondistended; Bowel sounds present  EXTREMITIES: Swelling in the feet B/L  PSYCH: Normal mood and affect, alert and oriented  NEUROLOGY: Non Focal  SKIN: No rashes or lesions    LABS:                        10.2   6.81  )-----------( 167      ( 30 Dec 2018 12:21 )             32.8                     Consultant(s) Notes Reviewed:  Pulm, cardiology    Care Discussed with Consultants/Other Providers:    The above recommendations were discussed with the patient/family. The patient/family had all questions answered and expressed understanding of the plan.

## 2018-12-31 NOTE — PROGRESS NOTE ADULT - PROBLEM SELECTOR PLAN 7
WBC: 6.8----> from 18.5  improved  Was most likely 2/2 steroids, afebrile, monitor off abx. WBC: 6.8----> from 18.5  improved  Was most likely 2/2 steroids, afebrile, on azithromycin.

## 2018-12-31 NOTE — PROGRESS NOTE ADULT - PROBLEM SELECTOR PROBLEM 10
Left msg for pt to call back. Can pt reschedule her Pre-Op appt with Deyanira on Thurs 6/21 to Wed 6/20 at 1130am with arrival time of 11:15am and appt should be 40 minutes.    Coronary artery disease involving native heart without angina pectoris, unspecified vessel or lesion type

## 2018-12-31 NOTE — CHART NOTE - NSCHARTNOTEFT_GEN_A_CORE
Endocrine chart note    Overall well controlled blood glucose.  pt with blood glucose at 77 yesterday at lunch (had received premeal plus correction)  will decrease premeal lunch to 4 from 5   continue the same for lantus and other premeal lispro       inform endocrine of hypoglycemia or persistent hyperglycemia episodes as changes in pts insulin regimen will need to be made.   notify endocrine if any plans to be NPO/diet changes as this will also affect insulin regimen.      Loreta Karimi MD  Pager [phone] ([facility])/ [phone] ([facility]) [please provide 10 digit call back number]  Nights and weekends: [phone]  Please note that this patient may be followed by a different provider tomorrow. If no answer or after hours, please contact [phone].  For final dc reccomendations, please call [phone] or page the endocrine fellow on call.

## 2018-12-31 NOTE — PROGRESS NOTE ADULT - PROBLEM SELECTOR PLAN 3
Plan: A1C 7.2  -c/w Lantus 5 units QHS  -c/w Humalog  4-3-2 units  w/meals   - Continue to monitor blood sugars.  - Endocrine following.

## 2018-12-31 NOTE — PROGRESS NOTE ADULT - PROBLEM SELECTOR PLAN 8
-Echo with  mild diastolic dysfunction, no significant changes from prior study in 2014.  lasix 40mg qd   cardiology follow up.

## 2018-12-31 NOTE — PROGRESS NOTE ADULT - ASSESSMENT
58 F PMH COPD on home O2 3L, HTN, HLD, CAD s/p x6 stents, T2DM, pHTN, PVD, p/w progressive worsening dyspnea x 2 weeks now complicated by chronic progressive hypoxic respiratory failure.     1. Chronic progressive hypoxic respiratory failure  multifactorial in the setting of worsening COPD, CAD, chronic diastolic CHF, pulmonary HTN   bl feet edema improved, continue  lasix 40mg PO daily   UE/ LE dopplers negative for DVT   echo with normal LV function, mild diastolic dysfunction, inadequate tricuspid regurg   cv stable no chest pain or sob   bipap PRN  continue dueonebs, inhaled steroids, singulair/theophylline/daliresp, pulm f/u   EKG reviewed, corrected QT WNL (aprox 462), on azithromycin    2. CAD s/p PCI  cv stable  continue asa, imdur    3. COPD   remains on bipap  continue Duoneb inhaled steroids, singulair/theophylline/daliresp, pulm f/u     4. hyponatremia/hyperkalemia  trend bmp, med f/u  dvt ppx   dc planning per primary team; Transfer to [facility] Lung Transplant team vs inpatient Pulmonary Rehab

## 2019-01-01 DIAGNOSIS — R11.0 NAUSEA: ICD-10-CM

## 2019-01-01 LAB
ANION GAP SERPL CALC-SCNC: 11 MMOL/L — SIGNIFICANT CHANGE UP (ref 5–17)
BASE EXCESS BLDA CALC-SCNC: 17.3 MMOL/L — HIGH (ref -2–2)
BUN SERPL-MCNC: 30 MG/DL — HIGH (ref 7–23)
CALCIUM SERPL-MCNC: 9.7 MG/DL — SIGNIFICANT CHANGE UP (ref 8.4–10.5)
CHLORIDE SERPL-SCNC: 89 MMOL/L — LOW (ref 96–108)
CO2 BLDA-SCNC: 46 MMOL/L — HIGH (ref 22–30)
CO2 SERPL-SCNC: 39 MMOL/L — HIGH (ref 22–31)
CREAT SERPL-MCNC: 0.96 MG/DL — SIGNIFICANT CHANGE UP (ref 0.5–1.3)
GLUCOSE BLDC GLUCOMTR-MCNC: 124 MG/DL — HIGH (ref 70–99)
GLUCOSE BLDC GLUCOMTR-MCNC: 137 MG/DL — HIGH (ref 70–99)
GLUCOSE BLDC GLUCOMTR-MCNC: 264 MG/DL — HIGH (ref 70–99)
GLUCOSE SERPL-MCNC: 258 MG/DL — HIGH (ref 70–99)
HCO3 BLDA-SCNC: 44 MMOL/L — HIGH (ref 21–29)
HCT VFR BLD CALC: 30.6 % — LOW (ref 34.5–45)
HGB BLD-MCNC: 10 G/DL — LOW (ref 11.5–15.5)
MCHC RBC-ENTMCNC: 29.1 PG — SIGNIFICANT CHANGE UP (ref 27–34)
MCHC RBC-ENTMCNC: 32.7 GM/DL — SIGNIFICANT CHANGE UP (ref 32–36)
MCV RBC AUTO: 89.2 FL — SIGNIFICANT CHANGE UP (ref 80–100)
PCO2 BLDA: 69 MMHG — HIGH (ref 32–46)
PH BLDA: 7.42 — SIGNIFICANT CHANGE UP (ref 7.35–7.45)
PLATELET # BLD AUTO: 235 K/UL — SIGNIFICANT CHANGE UP (ref 150–400)
PO2 BLDA: 165 MMHG — HIGH (ref 74–108)
POTASSIUM SERPL-MCNC: 4.3 MMOL/L — SIGNIFICANT CHANGE UP (ref 3.5–5.3)
POTASSIUM SERPL-SCNC: 4.3 MMOL/L — SIGNIFICANT CHANGE UP (ref 3.5–5.3)
RBC # BLD: 3.43 M/UL — LOW (ref 3.8–5.2)
RBC # FLD: 14.2 % — SIGNIFICANT CHANGE UP (ref 10.3–14.5)
SAO2 % BLDA: 97 % — HIGH (ref 92–96)
SODIUM SERPL-SCNC: 139 MMOL/L — SIGNIFICANT CHANGE UP (ref 135–145)
WBC # BLD: 5.3 K/UL — SIGNIFICANT CHANGE UP (ref 3.8–10.5)
WBC # FLD AUTO: 5.3 K/UL — SIGNIFICANT CHANGE UP (ref 3.8–10.5)

## 2019-01-01 PROCEDURE — 99233 SBSQ HOSP IP/OBS HIGH 50: CPT

## 2019-01-01 RX ORDER — METOCLOPRAMIDE HCL 10 MG
10 TABLET ORAL ONCE
Qty: 0 | Refills: 0 | Status: COMPLETED | OUTPATIENT
Start: 2019-01-01 | End: 2019-01-01

## 2019-01-01 RX ORDER — ONDANSETRON 8 MG/1
4 TABLET, FILM COATED ORAL ONCE
Qty: 0 | Refills: 0 | Status: COMPLETED | OUTPATIENT
Start: 2019-01-01 | End: 2019-01-02

## 2019-01-01 RX ADMIN — Medication 100 MILLIGRAM(S): at 13:20

## 2019-01-01 RX ADMIN — Medication 3 MILLILITER(S): at 04:19

## 2019-01-01 RX ADMIN — Medication 1 MILLIGRAM(S): at 21:26

## 2019-01-01 RX ADMIN — Medication 1 DROP(S): at 04:17

## 2019-01-01 RX ADMIN — AZITHROMYCIN 250 MILLIGRAM(S): 500 TABLET, FILM COATED ORAL at 09:44

## 2019-01-01 RX ADMIN — Medication 3 MILLILITER(S): at 21:15

## 2019-01-01 RX ADMIN — Medication 100 MILLIGRAM(S): at 21:27

## 2019-01-01 RX ADMIN — Medication 2000 UNIT(S): at 12:46

## 2019-01-01 RX ADMIN — Medication 10 MILLIGRAM(S): at 15:50

## 2019-01-01 RX ADMIN — Medication 100 MILLIGRAM(S): at 04:17

## 2019-01-01 RX ADMIN — PANTOPRAZOLE SODIUM 40 MILLIGRAM(S): 20 TABLET, DELAYED RELEASE ORAL at 04:17

## 2019-01-01 RX ADMIN — ONDANSETRON 4 MILLIGRAM(S): 8 TABLET, FILM COATED ORAL at 12:33

## 2019-01-01 RX ADMIN — Medication 500000 UNIT(S): at 00:09

## 2019-01-01 RX ADMIN — Medication 400 MILLIGRAM(S): at 09:44

## 2019-01-01 RX ADMIN — SENNA PLUS 2 TABLET(S): 8.6 TABLET ORAL at 21:27

## 2019-01-01 RX ADMIN — Medication 0.5 MILLIGRAM(S): at 04:18

## 2019-01-01 RX ADMIN — ONDANSETRON 4 MILLIGRAM(S): 8 TABLET, FILM COATED ORAL at 19:03

## 2019-01-01 RX ADMIN — Medication 1 DROP(S): at 21:16

## 2019-01-01 RX ADMIN — Medication 4 UNIT(S): at 09:42

## 2019-01-01 RX ADMIN — Medication 40 MILLIGRAM(S): at 04:18

## 2019-01-01 RX ADMIN — Medication 1 TABLET(S): at 12:46

## 2019-01-01 RX ADMIN — Medication 3 MILLILITER(S): at 09:43

## 2019-01-01 RX ADMIN — Medication 1 DROP(S): at 13:20

## 2019-01-01 RX ADMIN — INSULIN GLARGINE 6 UNIT(S): 100 INJECTION, SOLUTION SUBCUTANEOUS at 21:26

## 2019-01-01 RX ADMIN — Medication 3: at 09:42

## 2019-01-01 RX ADMIN — Medication 3 UNIT(S): at 17:56

## 2019-01-01 RX ADMIN — Medication 0.5 MILLIGRAM(S): at 17:57

## 2019-01-01 RX ADMIN — Medication 3: at 12:56

## 2019-01-01 RX ADMIN — Medication 81 MILLIGRAM(S): at 12:47

## 2019-01-01 RX ADMIN — POLYETHYLENE GLYCOL 3350 17 GRAM(S): 17 POWDER, FOR SOLUTION ORAL at 12:49

## 2019-01-01 RX ADMIN — Medication 500000 UNIT(S): at 17:57

## 2019-01-01 RX ADMIN — ISOSORBIDE MONONITRATE 30 MILLIGRAM(S): 60 TABLET, EXTENDED RELEASE ORAL at 12:47

## 2019-01-01 RX ADMIN — Medication 3 MILLILITER(S): at 13:19

## 2019-01-01 RX ADMIN — Medication 3 MILLILITER(S): at 17:56

## 2019-01-01 RX ADMIN — Medication 500000 UNIT(S): at 04:17

## 2019-01-01 RX ADMIN — MONTELUKAST 10 MILLIGRAM(S): 4 TABLET, CHEWABLE ORAL at 12:48

## 2019-01-01 RX ADMIN — ONDANSETRON 4 MILLIGRAM(S): 8 TABLET, FILM COATED ORAL at 00:10

## 2019-01-01 RX ADMIN — Medication 20 MILLIGRAM(S): at 04:18

## 2019-01-01 RX ADMIN — ENOXAPARIN SODIUM 40 MILLIGRAM(S): 100 INJECTION SUBCUTANEOUS at 21:27

## 2019-01-01 NOTE — PROGRESS NOTE ADULT - SUBJECTIVE AND OBJECTIVE BOX
[facility] - [facility]     PROGRESS NOTE    CHIEF COMPLAINT: chronic hypoxic/hypercapnic respiratory failure; COPD exacerbation; emphysema; dyspnea    INTERVAL HISTORY: looks comfortable on a nasal canula sitting in the chair but reports severe shortness of breath just with talking requiring BIPAP on and off during the day and night; ABG with worsening (but well compensated) respiratory acidosis; has not walked in some time; using a commode brought to her bedside or chair to go to the bathroom; less leg and arm swelling and improved leg strength on diuretics and reduced dose of steroids; resolved AURORA and hyponatremia; no cough, sputum production, chest congestion or wheeze; no fevers, chills or sweats; no chest pain/pressure or palpitations;    REVIEW OF SYSTEMS:  Constitutional: As per interval history  HEENT: Within normal limits  CV: As per interval history  Resp: As per interval history  GI: Within normal limits   : Within normal limits  Musculoskeletal: lower extremity weakness and pain  Skin: Within normal limits  Neurological: Within normal limits  Psychiatric: angry and anxious  Endocrine: Within normal limits  Hematologic/Lymphatic: Within normal limits  Allergic/Immunologic: Within normal limits    MEDICATIONS:     Pulmonary "  ALBUTerol/ipratropium for Nebulization 3 milliLiter(s) Nebulizer <User Schedule>  buDESOnide   0.5 milliGRAM(s) Respule 0.5 milliGRAM(s) Inhalation every 12 hours  montelukast 10 milliGRAM(s) Oral daily  theophylline ER Capsule 400 milliGRAM(s) Oral daily      Anti-microbials:  azithromycin   Tablet 250 milliGRAM(s) Oral <User Schedule>  nystatin    Suspension [id number] Unit(s) Oral four times a day      Cardiovascular:  furosemide    Tablet 40 milliGRAM(s) Oral daily  isosorbide   mononitrate ER Tablet (IMDUR) 30 milliGRAM(s) Oral daily      Other:  artificial tears (preservative free) Ophthalmic Solution 1 Drop(s) Both EYES three times a day  aspirin enteric coated 81 milliGRAM(s) Oral daily  Biotene Dry Mouth Oral Rinse 5 milliLiter(s) Swish and Spit two times a day  bisacodyl Suppository 10 milliGRAM(s) Rectal daily PRN  cholecalciferol 2000 Unit(s) Oral daily  dextrose 40% Gel 15 Gram(s) Oral once PRN  dextrose 5%. 1000 milliLiter(s) IV Continuous <Continuous>  dextrose 50% Injectable 12.5 Gram(s) IV Push once  dextrose 50% Injectable 25 Gram(s) IV Push once  dextrose 50% Injectable 25 Gram(s) IV Push once  docusate sodium 100 milliGRAM(s) Oral three times a day  enoxaparin Injectable 40 milliGRAM(s) SubCutaneous every 24 hours  glucagon  Injectable 1 milliGRAM(s) IntraMuscular once PRN  insulin glargine Injectable (LANTUS) 6 Unit(s) SubCutaneous at bedtime  insulin lispro (HumaLOG) corrective regimen sliding scale   SubCutaneous three times a day before meals  insulin lispro (HumaLOG) corrective regimen sliding scale   SubCutaneous at bedtime  insulin lispro Injectable (HumaLOG) 4 Unit(s) SubCutaneous before breakfast  insulin lispro Injectable (HumaLOG) 3 Unit(s) SubCutaneous with dinner  insulin lispro Injectable (HumaLOG) 4 Unit(s) SubCutaneous before lunch  melatonin 1 milliGRAM(s) Oral at bedtime  multivitamin 1 Tablet(s) Oral daily  ondansetron Injectable 4 milliGRAM(s) IV Push every 8 hours PRN  pantoprazole    Tablet 40 milliGRAM(s) Oral before breakfast  polyethylene glycol 3350 17 Gram(s) Oral daily  predniSONE   Tablet 20 milliGRAM(s) Oral daily  Roflumilast (Daliresp) 250 MICROGram(s) 250 MICROGram(s) Oral daily  senna 2 Tablet(s) Oral at bedtime  simethicone 80 milliGRAM(s) Chew once  sodium chloride 0.65% Nasal 1 Spray(s) Both Nostrils two times a day PRN        OBJECTIVE:    I&O's Detail    31 Dec 2018 07:01  -  01 Jan 2019 07:00  --------------------------------------------------------  IN:    Oral Fluid: 1040 mL  Total IN: 1040 mL    OUT:    Voided: 1600 mL  Total OUT: 1600 mL    Total NET: -560 mL    POCT Blood Glucose.: 264 mg/dL (01 Jan 2019 12:55)  POCT Blood Glucose.: 227 mg/dL (31 Dec 2018 22:04)  POCT Blood Glucose.: 183 mg/dL (31 Dec 2018 17:34)      PHYSICAL EXAM:       ICU Vital Signs Last 24 Hrs  T(C): 36.6 (01 Jan 2019 04:12), Max: 36.6 (31 Dec 2018 19:59)  T(F): 97.9 (01 Jan 2019 04:12), Max: 97.9 (31 Dec 2018 19:59)  HR: 98 (01 Jan 2019 12:52) (78 - 99)  BP: 135/75 (01 Jan 2019 12:52) (135/75 - 159/92)  BP(mean): --  ABP: --  ABP(mean): --  RR: 20 (01 Jan 2019 09:35) (18 - 20)  SpO2: 100% (01 Jan 2019 10:50) (92% - 100%) on 5lpm nasal canula     General: Awake. Alert. Cooperative. No distress. Appears stated age. Obese. Cushingoid.  HEENT:  Atraumatic. Moonlike facies. Anicteric. Normal oral mucosa. PERRL. EOMI.   Neck: Supple. Trachea midline. Thyroid without enlargement/tenderness/nodules. No carotid bruit. No JVD.	  Cardiovascular: Regular rate and rhythm. Distant S1 S2. No murmurs, rubs or gallops.  Respiratory: Respirations unlabored. Markedly decreased breath sounds throughout. Prolonged expiratory phase of respiration. No wheeze. No curvature.  Abdomen: Soft. Non-tender. Non-distended. No organomegaly. No masses. Normal bowel sounds. Obese.  Extremities: Warm to touch. No clubbing or cyanosis. Mild bilateral lower extremity edema up to the thigh. Mild bilateral upper extremity swelling.  Pulses: Decreased lower extremity peripheral pulses.  Skin: No rashes or lesions. No ecchymoses. No cyanosis. Warm to touch. Multiple upper extremity excoriations  Lymph Nodes: Cervical, supraclavicular and axillary nodes normal  Neurological: Motor and sensory examination equal and normal. A and O x 3  Psychiatry: Appropriate mood and affect.     LABS:                        10.0   5.3   )-----------( 235      ( 01 Jan 2019 09:28 )             30.6     01-01    139  |  89<L>  |  30<H>  ----------------------------<  258<H>  4.3   |  39<H>  |  0.96    12-29    138  |  88<L>  |  29<H>  ----------------------------<  182<H>  3.8   |  39<H>  |  1.11    Ca      9.7      01-01    Ca      9.6      12-29    ABG - ( 01 Jan 2019 03:44 )  pH: 7.42  /  pCO2: 69    /  pO2: 165   / HCO3: 44    / Base Excess: 17.3  /  SaO2: 97        ABG - ( 31 Dec 2018 16:19 )  pH: 7.38  /  pCO2: 84    /  pO2: 26    / HCO3: 48    / Base Excess: 20.2  /  SaO2: 41        ABG - ( 18 Dec 2018 13:23 )  pH: 7.46  /  pCO2: 47    /  pO2: 88    / HCO3: 33    / Base Excess: 8.0   /  SaO2: 97        ABG - ( 16 Dec 2018 20:53 )  pH: 7.44  /  pCO2: 53    /  pO2: 173   / HCO3: 36    / Base Excess: 10.0  /  SaO2: 100       ABG - ( 13 Dec 2018 06:29 )  pH: 7.30  /  pCO2: 71    /  pO2: 182   / HCO3: 34    / Base Excess: 5.8   /  SaO2: 99        ABG - ( 13 Dec 2018 00:59 )  pH: 7.32  /  pCO2: 71    /  pO2: 75    / HCO3: 35    / Base Excess: 7.1   /  SaO2: 95        ABG - ( 11 Dec 2018 11:45 )  pH: 7.39  /  pCO2: 54    /  pO2: 98    / HCO3: 32    / Base Excess: 6.2   /  SaO2: 98        ABG - ( 09 Dec 2018 11:26 )  pH: 7.35  /  pCO2: 63    /  pO2: 145   / HCO3: 34    / Base Excess: 6.6   /  SaO2: 99      ABG - ( 09 Dec 2018 00:28 )  pH: 7.32  /  pCO2: 71    /  pO2: 124   / HCO3: 36    / Base Excess: 7.6   /  SaO2: 99        ABG - ( 08 Dec 2018 20:50 )  pH: 7.32  /  pCO2: 72    /  pO2: 80    / HCO3: 36    / Base Excess: 7.7   /  SaO2: 95        Serum Pro-Brain Natriuretic Peptide: 254 pg/mL (12-13 @ 14:00)    < from: Transthoracic Echocardiogram (12.07.18 @ 08:59) >    Patient name: GUY HARTMAN  YOB: 1960   Age: 58 (F)   MR#: [id number]  Study Date: 12/7/2018  Location: [address] [place], [place] [id number]: [facility]  Study quality: Technically good  Referring Physician: Allen ingram MD  BloodPressure: 120/80 mmHg  Height: 163 cm  Weight: 88 kg  BSA: 1.9 m2  ------------------------------------------------------------------------  PROCEDURE: Transthoracic echocardiogram with 2-D, M-Mode  and complete spectral and color flow Doppler.  INDICATION: Other forms of dyspnea (R06.09)  ------------------------------------------------------------------------  Dimensions:    Normal Values:  LA:     3.6    2.0 - 4.0 cm  Ao:     2.9    2.0 - 3.8 cm  SEPTUM: 0.9    0.6 - 1.2 cm  PWT:    0.8    0.6- 1.1 cm  LVIDd:  4.9    3.0 - 5.6 cm  LVIDs:  2.9    1.8 - 4.0 cm  Derived variables:  LVMI: 73 g/m2  RWT: 0.32  Fractional short: 40 %  EF (Teicholtz): 71 %  Doppler Peak Velocity (m/sec): AoV=1.2  ------------------------------------------------------------------------  Observations:  Mitral Valve: Normal mitral valve.  Aortic Valve/Aorta: Normal trileaflet aortic valve. Peak  transaortic valve gradient equals 6 mm Hg, mean transaortic  valve gradient equals 3 mm Hg, aortic valve velocity time  integral equals 22 cm. Trace aortic regurgitation.  Aortic Root: 2.9 cm.  Left Atrium: Normal left atrium.  LA volume index = 18  cc/m2.  Left Ventricle: Normal left ventricular systolic function.  No segmental wall motion abnormalities. Normal left  ventricular internal dimensions and wall thicknesses. Mild  diastolic dysfunction (Stage I).  Right Heart: Normal right atrium. Normal right ventricular  size and function. Normal tricuspid valve. Minimal  tricuspid regurgitation. Normal pulmonic valve.  Pericardium/Pleura: Normal pericardium with no pericardial  effusion.  Hemodynamic: Estimated right atrial pressure is 8 mm Hg.  Inadequate tricuspid regurgitation Doppler envelope  precludes estimation of RVSP.  ------------------------------------------------------------------------  Conclusions:  1. Normal mitral valve.  2. Normal trileaflet aortic valve. Peak transaortic valve  gradient equals 6 mm Hg, mean transaortic valve gradient  equals 3 mm Hg, aortic valve velocity time integral equals  22 cm. Trace aortic regurgitation.  3. Aortic Root: 2.9 cm.  4. Normal left atrium.  LA volume index = 18 cc/m2.  5. Normal left ventricular internal dimensions and wall  thicknesses.  6. Normal left ventricular systolic function. No segmental  wall motion abnormalities.  7. Mild diastolic dysfunction (Stage I).  8. Normal right ventricular size and function.  9. Inadequate tricuspid regurgitation Doppler envelope  precludes estimation of RVSP.  10. Normal tricuspid valve. Minimal tricuspid  regurgitation.  *** Compared with echocardiogram report of 2/18/2014, no  significant changes noted.  ------------------------------------------------------------------------  Confirmed on  12/7/2018 - 13:49:43 by Shefali Grayver, M.D.  ------------------------------------------------------------------------    < end of copied text >  ---------------------------------------------------------------------------------------------------------------    MICROBIOLOGY:     Culture - Sputum . (12.07.18 @ 08:34)    Gram Stain:   Rare polymorphonuclear leukocytes per low power field  Rare Squamous epithelial cells per low power field  Numerous Gram Positive Cocci in Pairs and Chains per oil power field    Specimen Source: .Sputum Sputum    Culture Results:   Normal Respiratory Samaria present    Culture - Sputum . (12.05.18 @ 22:50)    Gram Stain:   Moderate polymorphonuclear leukocytes per low power field  Few Squamous epithelial cells per low power field  Moderate Gram Positive Cocci in Pairs and Chains per oil power field    Specimen Source: .Sputum Sputum    Culture Results:   Normal Respiratory Samaria present    Rapid Respiratory Viral Panel (11.30.18 @ 01:30)    Rapid RVP Result: NotDete: The [employer] RVP Rapid uses polymerase chain reaction (PCR) and melt  curve analysis to screen for adenovirus; coronavirus HKU1, NL63, 229E,  OC43; human metapneumovirus (hMPV); human enterovirus/rhinovirus  (Entero/RV); influenza A; influenza A/H1;influenza A/H3; influenza  A/H1-2009; influenza B; parainfluenza viruses 1, 2, 3, 4; respiratory  syncytial virus; Bordetella pertussis; Mycoplasma pneumoniae; and  Chlamydophila pneumoniae.    RADIOLOGY:  [x] Chest radiographs reviewed and interpreted by me    < from: [facility] Duplex Lower Ext Vein Scan, Bilat (12.30.18 @ 19:06) >    EXAM:  DUPLEX SCAN EXT VEINS LOWER BI                          PROCEDURE DATE:  12/30/2018      INTERPRETATION:  Clinical indication: Worsening edema.    Technique:  Grayscale, color Doppler and spectral Doppler ultrasound was   utilized toevaluate bilateral lower extremity deep venous system.      Comparison: 12/6/2018.    Findings: There is no thrombosis in bilateral common femoral veins,   femoral veins or popliteal veins. Visualized calf veins are patent. There   is bilateral lowerextremity soft tissue edema.    Impression:     No evidence of deep vein thrombosis in either lower extremity.    BROCK TOBIN M.D., ATTENDING RADIOLOGIST  This document has been electronically signed. Dec 30 2018  7:24PM     < end of copied text >  ---------------------------------------------------------------------------------------------------------------  < from: [facility] Duplex Upper Ext Vein Scan, Darius (12.30.18 @ 19:05) >    EXAM:  DUPLEX SCAN EXT VEINS UPPER BI                          PROCEDURE DATE:  12/30/2018      INTERPRETATION:  Clinical information: Worsening bilateral upper   extremity edema.    Findings: Duplex evaluation of the deep venous system of the right and   left upper extremity was performed to include the brachiocephalic,   internal jugular, subclavian, axillary, brachial, radial and ulnar veins.   No echogenic thrombus is seen within the vein lumina. Complete coaptation   of those segments of vein accessible to compression was achieved.   Spontaneous venous flow with respiratory and cardiac pulsatility is noted   throughout.     The right innominate vein is patent. The left innominate vein was not   visualized.     The right and left basilic and cephalic veins are patent and compressible.    Impression: No duplex evidence of DVT in the right and left upper   extremity.    RAYMUNDO DUMONT M.D., ATTENDING RADIOLOGIST  This document has been electronically signed. Dec 31 2018  8:49AM     < end of copied text >  ---------------------------------------------------------------------------------------------------------------  < from: Xray Chest 1 View AP/PA (12.25.18 @ 18:04) >    EXAM:  XR CHEST AP OR PA 1V                          PROCEDURE DATE:  12/25/2018      INTERPRETATION:  CLINICAL INFORMATION: Shortness of breath. History of   COPD.    TECHNIQUE: AP view of the chest 12/25/2018.    COMPARISON: Chest radiograph 12/13/2018.     FINDINGS:     The lungs are clear. There is no pneumothorax and no pleural effusions.   Cardiac size is not accurately evaluated in this projection.  The visualized osseous structures demonstrate no acute abnormality.    IMPRESSION:   Clear lungs.    SUSANA HODGES M.D., RADIOLOGY RESIDENT  This document has been electronically signed.  SVETLANA SANDOVAL M.D., ATTENDING RADIOLOGIST  This document has been electronically signed. Dec 26 2018 10:59AM      < end of copied text >  ---------------------------------------------------------------------------------------------------------------  < from: CT Angio Chest w/ IV Cont (12.04.18 @ 16:30) >    EXAM:  CT ANGIO CHEST (W)AW IC                          PROCEDURE DATE:  12/04/2018      INTERPRETATION:  CT CHEST WITH CONTRAST    INDICATION: Known COPD not responding to steroids and bronchodilators.   Progressively worsening dyspnea. Evaluate for pulmonary embolism.    TECHNIQUE: Enhanced helical images were obtained of the chest. Coronal   and sagittal images were reconstructed. Maximum intensity projection   images were generated. Images were obtained after the uneventful   administration of 60 cc nonionic intravenous Omnipaque 350.  40 cc of   Omnipaque 350 was discarded.    COMPARISON: CT chest 2/18/2014.    FINDINGS:     Lungs And Airways: Emphysematous changes of the lung.      The airways are unremarkable.      Pleura: No pneumothorax. No pleural effusion.    Mediastinum: There are no enlarged chest lymph nodes. The visualized   portion of the thyroid gland is unremarkable.       Heart and Vasculature: No pulmonary embolism.    The main pulmonary artery is normal in caliber. No thoracic aortic   aneurysm or dissection. Atheromatous disease of the aorta.    The heart is normal in size.  There is no pericardial effusion.   Coronary artery disease with calcified plaque involving the right and   left main coronary arteries and the left anterior descending artery.      Upper Abdomen: The upper abdomen is unremarkable.    Bones And Soft Tissues: Degenerative changes of the spine.  The soft   tissues are unremarkable.      IMPRESSION:   1.  No pulmonary embolism.  2. Emphysematous changes of the lung.    DIANA MEDINA M.D., RADIOLOGY RESIDENT  This document has been electronically signed.  JEYSON SANCHEZ M.D., ATTENDING RADIOLOGIST  This document has been electronically signed. Dec  4 2018  5:21PM      < end of copied text >  ---------------------------------------------------------------------------------------------------------------  SPIROMETRY:     FEV1 0.56 liters - 22% predicted  FVC 1.73 liters - 52% predicted  FEV1% 32    c/w very severe obstructive lung disease

## 2019-01-01 NOTE — PROGRESS NOTE ADULT - PROBLEM SELECTOR PLAN 6
Dopplers negative for DVT.  Echo report noted, mild diastolic dysfunction, no significant changes from prior study in 2014.  Suspect leg edema may be related to recent high dose steroids.  UE dopplers previously negative, pt with increase LUE edema  repeat  dopplers negative for  dvt in UE/LE  lasix 40mg qd

## 2019-01-01 NOTE — PROGRESS NOTE ADULT - SUBJECTIVE AND OBJECTIVE BOX
Patient is a 58y old  Female who presents with a chief complaint of dyspnea (31 Dec 2018 13:35)      SUBJECTIVE / OVERNIGHT EVENTS:    MEDICATIONS  (STANDING):  ALBUTerol/ipratropium for Nebulization 3 milliLiter(s) Nebulizer <User Schedule>  artificial tears (preservative free) Ophthalmic Solution 1 Drop(s) Both EYES three times a day  aspirin enteric coated 81 milliGRAM(s) Oral daily  azithromycin   Tablet 250 milliGRAM(s) Oral <User Schedule>  Biotene Dry Mouth Oral Rinse 5 milliLiter(s) Swish and Spit two times a day  buDESOnide   0.5 milliGRAM(s) Respule 0.5 milliGRAM(s) Inhalation every 12 hours  cholecalciferol 2000 Unit(s) Oral daily  dextrose 5%. 1000 milliLiter(s) (50 mL/Hr) IV Continuous <Continuous>  dextrose 50% Injectable 12.5 Gram(s) IV Push once  dextrose 50% Injectable 25 Gram(s) IV Push once  dextrose 50% Injectable 25 Gram(s) IV Push once  docusate sodium 100 milliGRAM(s) Oral three times a day  enoxaparin Injectable 40 milliGRAM(s) SubCutaneous every 24 hours  furosemide    Tablet 40 milliGRAM(s) Oral daily  insulin glargine Injectable (LANTUS) 6 Unit(s) SubCutaneous at bedtime  insulin lispro (HumaLOG) corrective regimen sliding scale   SubCutaneous three times a day before meals  insulin lispro (HumaLOG) corrective regimen sliding scale   SubCutaneous at bedtime  insulin lispro Injectable (HumaLOG) 4 Unit(s) SubCutaneous before breakfast  insulin lispro Injectable (HumaLOG) 3 Unit(s) SubCutaneous with dinner  insulin lispro Injectable (HumaLOG) 4 Unit(s) SubCutaneous before lunch  isosorbide   mononitrate ER Tablet (IMDUR) 30 milliGRAM(s) Oral daily  melatonin 1 milliGRAM(s) Oral at bedtime  montelukast 10 milliGRAM(s) Oral daily  multivitamin 1 Tablet(s) Oral daily  nystatin    Suspension 984810 Unit(s) Oral four times a day  pantoprazole    Tablet 40 milliGRAM(s) Oral before breakfast  polyethylene glycol 3350 17 Gram(s) Oral daily  predniSONE   Tablet 20 milliGRAM(s) Oral daily  senna 2 Tablet(s) Oral at bedtime  simethicone 80 milliGRAM(s) Chew once  theophylline ER Capsule 400 milliGRAM(s) Oral daily    MEDICATIONS  (PRN):  bisacodyl Suppository 10 milliGRAM(s) Rectal daily PRN Constipation  dextrose 40% Gel 15 Gram(s) Oral once PRN Blood Glucose LESS THAN 70 milliGRAM(s)/deciliter  glucagon  Injectable 1 milliGRAM(s) IntraMuscular once PRN Glucose LESS THAN 70 milligrams/deciliter  ondansetron Injectable 4 milliGRAM(s) IV Push every 8 hours PRN Nausea and/or Vomiting  sodium chloride 0.65% Nasal 1 Spray(s) Both Nostrils two times a day PRN Nasal Congestion      Vital Signs Last 24 Hrs  T(C): 36.6 (01 Jan 2019 04:12), Max: 36.6 (31 Dec 2018 19:59)  T(F): 97.9 (01 Jan 2019 04:12), Max: 97.9 (31 Dec 2018 19:59)  HR: 98 (01 Jan 2019 12:52) (78 - 99)  BP: 135/75 (01 Jan 2019 12:52) (135/75 - 159/92)  BP(mean): --  RR: 20 (01 Jan 2019 09:35) (18 - 20)  SpO2: 100% (01 Jan 2019 10:50) (92% - 100%)  CAPILLARY BLOOD GLUCOSE      POCT Blood Glucose.: 264 mg/dL (01 Jan 2019 12:55)  POCT Blood Glucose.: 227 mg/dL (31 Dec 2018 22:04)  POCT Blood Glucose.: 183 mg/dL (31 Dec 2018 17:34)    I&O's Summary    31 Dec 2018 07:01  -  01 Jan 2019 07:00  --------------------------------------------------------  IN: 1040 mL / OUT: 1600 mL / NET: -560 mL    01 Jan 2019 07:01  -  01 Jan 2019 14:56  --------------------------------------------------------  IN: 0 mL / OUT: 400 mL / NET: -400 mL        PHYSICAL EXAM:  GENERAL: NAD, well-developed  HEAD:  Atraumatic, Normocephalic  EYES: EOMI, PERRLA, conjunctiva and sclera clear  NECK: Supple, No JVD  CHEST/LUNG: Clear to auscultation bilaterally; No wheeze  HEART: Regular rate and rhythm; No murmurs, rubs, or gallops  ABDOMEN: Soft, Nontender, Nondistended; Bowel sounds present  EXTREMITIES:  2+ Peripheral Pulses, No clubbing, cyanosis, or edema  PSYCH: AAOx3  NEUROLOGY: non-focal  SKIN: No rashes or lesions    LABS:                        10.0   5.3   )-----------( 235      ( 01 Jan 2019 09:28 )             30.6     01-01    139  |  89<L>  |  30<H>  ----------------------------<  258<H>  4.3   |  39<H>  |  0.96    Ca    9.7      01 Jan 2019 10:35                RADIOLOGY & ADDITIONAL TESTS:    Imaging Personally Reviewed:    Consultant(s) Notes Reviewed:      Care Discussed with Consultants/Other Providers: Patient is a 58y old  Female who presents with a chief complaint of dyspnea (31 Dec 2018 13:35)      SUBJECTIVE / OVERNIGHT EVENTS:  Pt seen and examined. No acute events overnight. Reports no improvement in dyspnea. Also c/o nausea which is not being relieved by Zofran today.    MEDICATIONS  (STANDING):  ALBUTerol/ipratropium for Nebulization 3 milliLiter(s) Nebulizer <User Schedule>  artificial tears (preservative free) Ophthalmic Solution 1 Drop(s) Both EYES three times a day  aspirin enteric coated 81 milliGRAM(s) Oral daily  azithromycin   Tablet 250 milliGRAM(s) Oral <User Schedule>  Biotene Dry Mouth Oral Rinse 5 milliLiter(s) Swish and Spit two times a day  buDESOnide   0.5 milliGRAM(s) Respule 0.5 milliGRAM(s) Inhalation every 12 hours  cholecalciferol 2000 Unit(s) Oral daily  dextrose 5%. 1000 milliLiter(s) (50 mL/Hr) IV Continuous <Continuous>  dextrose 50% Injectable 12.5 Gram(s) IV Push once  dextrose 50% Injectable 25 Gram(s) IV Push once  dextrose 50% Injectable 25 Gram(s) IV Push once  docusate sodium 100 milliGRAM(s) Oral three times a day  enoxaparin Injectable 40 milliGRAM(s) SubCutaneous every 24 hours  furosemide    Tablet 40 milliGRAM(s) Oral daily  insulin glargine Injectable (LANTUS) 6 Unit(s) SubCutaneous at bedtime  insulin lispro (HumaLOG) corrective regimen sliding scale   SubCutaneous three times a day before meals  insulin lispro (HumaLOG) corrective regimen sliding scale   SubCutaneous at bedtime  insulin lispro Injectable (HumaLOG) 4 Unit(s) SubCutaneous before breakfast  insulin lispro Injectable (HumaLOG) 3 Unit(s) SubCutaneous with dinner  insulin lispro Injectable (HumaLOG) 4 Unit(s) SubCutaneous before lunch  isosorbide   mononitrate ER Tablet (IMDUR) 30 milliGRAM(s) Oral daily  melatonin 1 milliGRAM(s) Oral at bedtime  montelukast 10 milliGRAM(s) Oral daily  multivitamin 1 Tablet(s) Oral daily  nystatin    Suspension 396975 Unit(s) Oral four times a day  pantoprazole    Tablet 40 milliGRAM(s) Oral before breakfast  polyethylene glycol 3350 17 Gram(s) Oral daily  predniSONE   Tablet 20 milliGRAM(s) Oral daily  senna 2 Tablet(s) Oral at bedtime  simethicone 80 milliGRAM(s) Chew once  theophylline ER Capsule 400 milliGRAM(s) Oral daily    MEDICATIONS  (PRN):  bisacodyl Suppository 10 milliGRAM(s) Rectal daily PRN Constipation  dextrose 40% Gel 15 Gram(s) Oral once PRN Blood Glucose LESS THAN 70 milliGRAM(s)/deciliter  glucagon  Injectable 1 milliGRAM(s) IntraMuscular once PRN Glucose LESS THAN 70 milligrams/deciliter  ondansetron Injectable 4 milliGRAM(s) IV Push every 8 hours PRN Nausea and/or Vomiting  sodium chloride 0.65% Nasal 1 Spray(s) Both Nostrils two times a day PRN Nasal Congestion      Vital Signs Last 24 Hrs  T(C): 36.6 (01 Jan 2019 04:12), Max: 36.6 (31 Dec 2018 19:59)  T(F): 97.9 (01 Jan 2019 04:12), Max: 97.9 (31 Dec 2018 19:59)  HR: 98 (01 Jan 2019 12:52) (78 - 99)  BP: 135/75 (01 Jan 2019 12:52) (135/75 - 159/92)  BP(mean): --  RR: 20 (01 Jan 2019 09:35) (18 - 20)  SpO2: 100% (01 Jan 2019 10:50) (92% - 100%)  CAPILLARY BLOOD GLUCOSE      POCT Blood Glucose.: 264 mg/dL (01 Jan 2019 12:55)  POCT Blood Glucose.: 227 mg/dL (31 Dec 2018 22:04)  POCT Blood Glucose.: 183 mg/dL (31 Dec 2018 17:34)    I&O's Summary    31 Dec 2018 07:01  -  01 Jan 2019 07:00  --------------------------------------------------------  IN: 1040 mL / OUT: 1600 mL / NET: -560 mL    01 Jan 2019 07:01  -  01 Jan 2019 14:56  --------------------------------------------------------  IN: 0 mL / OUT: 400 mL / NET: -400 mL        PHYSICAL EXAM:  GENERAL: NAD, anicteric, afebrile  HEAD:  Atraumatic, Normocephalic  EYES: EOMI, PERRLA, conjunctiva and sclera clear  NECK: Supple, No JVD  CHEST/LUNG: Clear to auscultation bilaterally; No wheeze  HEART: Regular rate and rhythm; No murmurs, rubs, or gallops  ABDOMEN: Soft, Nontender, Nondistended; Bowel sounds present  EXTREMITIES: b/l UE /LE edema   PSYCH: AAOx3  NEUROLOGY: non-focal  SKIN: No rashes or lesions    LABS:                        10.0   5.3   )-----------( 235      ( 01 Jan 2019 09:28 )             30.6     01-01    139  |  89<L>  |  30<H>  ----------------------------<  258<H>  4.3   |  39<H>  |  0.96    Ca    9.7      01 Jan 2019 10:35                  Consultant(s) Notes Reviewed:  Pulmonology

## 2019-01-01 NOTE — PROGRESS NOTE ADULT - ASSESSMENT
ASSESSMENT:    ongoing dyspnea with minimal exertion with chronic hypoxic and hypercapnic respiratory failure due to very severe COPD with "exacerbation" - adequate oxygenation on a nasal canula in the setting of profound dyspnea suggests that alterations in the mechanics of breathing due to lung hyperinflation and obesity are likely playing a significant role in her shortness of breath - anxiety may also be playing a role - there is evidence of CAD without pulmonary hypertension on the CT scan which is without pulmonary emboli or pneumonia - cardiac catheterization last year revealed patent stents - ECHO has a preserved LVEF, no significant valvular heart disease and no pulmonary hypertension - resolved AURORA, hyperkalemia and hyponatremia - resolved respiratory acidosis with BIPAP support on repeat ABG although the pCO2 level has increased - lower extremity weakness and pain likely due to steroid induced myopathy perhaps exacerbated by statin use seems to be improving    extremity swelling likely related to steroid induced fluid retention and nocturnal hypoxemia with worsening of elevated pulmonary artery pressures - no evidence of DVT on repeat upper or lower extremity Duplex examination - improving with TOMAS stockings, decreased steroid dose and diuretics    HTN/HLD/DM    CAD s/p PCI    PAD    PLAN/RECOMMENDATIONS:    BIPAP 12/5 with 40% FiO2 for sleep - on and off during the day for increased work of breathing or recurrent respiratory acidosis   oxygen supplementation to keep saturation greater than 92% using a nasal canula when off BIPAP  sputum culture - no growth x2 - has completed a course of biaxin  home trilogy vent has been arranged with [facility]   albuterol/atrovent nebs q4h holding 2AM dose  pulmicort 0.5mg nebs q12h  singulair/theophylline - theophylline level is subtherapeutic - will discontinue daliresp  prednisone 20mg daily - glucose control - nystatin - continue steroid taper given lack of improvement with systemic steroids and likely steroid related side effects  azithromycin TIW for antiinflammatory effects  cardiology evaluation noted  cardiac meds: ASA/imdur/lasix - off crestor with possible myopathy - off norvasc which is perhaps was exacerbating swelling - BP control  diurese as tolerated by renal function and hemodynamics - watch for worsening metabolic alkalosis with loop diuretic use which could lead to worsening hypercapnia  GI/DVT prophylaxis - protoix/SQ lovenox  physical therapy called to bring exercise bands and exercise instructions that the patient can use on her own  bowel regimen  TOMAS stockings  transfer to acute rehab when able to ambulate with assistance  outpatient evaluation for lung transplantation    Will follow with you. Plan of care discussed with the patient at bedside. The patient is followed by Dr. Anita Mathew in the outpatient setting. [facility]" pulmonary should be called to assist with management of this challenging patient.    David Fair MD, [facility] - [phone]  Pulmonary Medicine

## 2019-01-01 NOTE — PROGRESS NOTE ADULT - PROBLEM SELECTOR PLAN 4
reports that nausea is not improving on Zofran today  will give a dose of Reglan 10mg IVP x 1 ; she was informed that it may cause some drowsiness and she  is in agreement  Pt's brother is in agreement as well  Covering RN present

## 2019-01-01 NOTE — PROGRESS NOTE ADULT - PROBLEM SELECTOR PLAN 1
c/w Prednisone 20mg po qd   Continue Pulmicort, Duoneb ATC   c/w Theophylline, Singulair.  Completed 7 days of biaxin   Home Trilogy vent arranged by pulmonary.  c/w intermittent bipap ; alternating with 5L NC  C/w daliresp  Pulm follow up with [place]/transplant list  cw azithromycin   D/w pulmonary plan for discharge to acute pulmonary rehab  Appreciate pm&r recs

## 2019-01-02 LAB
GLUCOSE BLDC GLUCOMTR-MCNC: 117 MG/DL — HIGH (ref 70–99)
GLUCOSE BLDC GLUCOMTR-MCNC: 139 MG/DL — HIGH (ref 70–99)
GLUCOSE BLDC GLUCOMTR-MCNC: 153 MG/DL — HIGH (ref 70–99)
GLUCOSE BLDC GLUCOMTR-MCNC: 241 MG/DL — HIGH (ref 70–99)
GLUCOSE BLDC GLUCOMTR-MCNC: 68 MG/DL — LOW (ref 70–99)
GLUCOSE BLDC GLUCOMTR-MCNC: 72 MG/DL — SIGNIFICANT CHANGE UP (ref 70–99)
GLUCOSE BLDC GLUCOMTR-MCNC: 77 MG/DL — SIGNIFICANT CHANGE UP (ref 70–99)
GLUCOSE BLDC GLUCOMTR-MCNC: 81 MG/DL — SIGNIFICANT CHANGE UP (ref 70–99)

## 2019-01-02 PROCEDURE — 99233 SBSQ HOSP IP/OBS HIGH 50: CPT

## 2019-01-02 RX ORDER — INSULIN GLARGINE 100 [IU]/ML
7 INJECTION, SOLUTION SUBCUTANEOUS AT BEDTIME
Qty: 0 | Refills: 0 | Status: DISCONTINUED | OUTPATIENT
Start: 2019-01-02 | End: 2019-01-03

## 2019-01-02 RX ORDER — ONDANSETRON 8 MG/1
4 TABLET, FILM COATED ORAL ONCE
Qty: 0 | Refills: 0 | Status: COMPLETED | OUTPATIENT
Start: 2019-01-02 | End: 2019-01-02

## 2019-01-02 RX ORDER — ONDANSETRON 8 MG/1
4 TABLET, FILM COATED ORAL EVERY 6 HOURS
Qty: 0 | Refills: 0 | Status: DISCONTINUED | OUTPATIENT
Start: 2019-01-02 | End: 2019-01-15

## 2019-01-02 RX ADMIN — Medication 1 DROP(S): at 14:08

## 2019-01-02 RX ADMIN — ONDANSETRON 4 MILLIGRAM(S): 8 TABLET, FILM COATED ORAL at 14:08

## 2019-01-02 RX ADMIN — MONTELUKAST 10 MILLIGRAM(S): 4 TABLET, CHEWABLE ORAL at 11:44

## 2019-01-02 RX ADMIN — Medication 500000 UNIT(S): at 11:44

## 2019-01-02 RX ADMIN — Medication 1 TABLET(S): at 11:44

## 2019-01-02 RX ADMIN — Medication 400 MILLIGRAM(S): at 08:12

## 2019-01-02 RX ADMIN — INSULIN GLARGINE 7 UNIT(S): 100 INJECTION, SOLUTION SUBCUTANEOUS at 22:34

## 2019-01-02 RX ADMIN — Medication 3 MILLILITER(S): at 22:34

## 2019-01-02 RX ADMIN — Medication 4 UNIT(S): at 09:05

## 2019-01-02 RX ADMIN — Medication 3 MILLILITER(S): at 13:09

## 2019-01-02 RX ADMIN — Medication 1 DROP(S): at 23:05

## 2019-01-02 RX ADMIN — ONDANSETRON 4 MILLIGRAM(S): 8 TABLET, FILM COATED ORAL at 11:19

## 2019-01-02 RX ADMIN — Medication 20 MILLIGRAM(S): at 05:34

## 2019-01-02 RX ADMIN — Medication 40 MILLIGRAM(S): at 05:34

## 2019-01-02 RX ADMIN — ISOSORBIDE MONONITRATE 30 MILLIGRAM(S): 60 TABLET, EXTENDED RELEASE ORAL at 11:44

## 2019-01-02 RX ADMIN — Medication 3 MILLILITER(S): at 05:34

## 2019-01-02 RX ADMIN — Medication 0.5 MILLIGRAM(S): at 05:35

## 2019-01-02 RX ADMIN — Medication 100 MILLIGRAM(S): at 05:34

## 2019-01-02 RX ADMIN — ONDANSETRON 4 MILLIGRAM(S): 8 TABLET, FILM COATED ORAL at 20:09

## 2019-01-02 RX ADMIN — Medication 3 MILLILITER(S): at 09:06

## 2019-01-02 RX ADMIN — Medication 500000 UNIT(S): at 17:06

## 2019-01-02 RX ADMIN — Medication 100 MILLIGRAM(S): at 14:08

## 2019-01-02 RX ADMIN — Medication 0.5 MILLIGRAM(S): at 17:06

## 2019-01-02 RX ADMIN — Medication 81 MILLIGRAM(S): at 11:51

## 2019-01-02 RX ADMIN — Medication 1 DROP(S): at 05:32

## 2019-01-02 RX ADMIN — Medication 5 MILLILITER(S): at 17:06

## 2019-01-02 RX ADMIN — Medication 3 MILLILITER(S): at 17:06

## 2019-01-02 RX ADMIN — Medication 500000 UNIT(S): at 05:33

## 2019-01-02 RX ADMIN — SENNA PLUS 2 TABLET(S): 8.6 TABLET ORAL at 22:34

## 2019-01-02 RX ADMIN — ENOXAPARIN SODIUM 40 MILLIGRAM(S): 100 INJECTION SUBCUTANEOUS at 22:34

## 2019-01-02 RX ADMIN — Medication 500000 UNIT(S): at 22:33

## 2019-01-02 RX ADMIN — Medication 2: at 09:05

## 2019-01-02 RX ADMIN — Medication 100 MILLIGRAM(S): at 22:34

## 2019-01-02 RX ADMIN — Medication 2000 UNIT(S): at 11:44

## 2019-01-02 RX ADMIN — Medication 4 UNIT(S): at 13:09

## 2019-01-02 RX ADMIN — PANTOPRAZOLE SODIUM 40 MILLIGRAM(S): 20 TABLET, DELAYED RELEASE ORAL at 05:34

## 2019-01-02 RX ADMIN — POLYETHYLENE GLYCOL 3350 17 GRAM(S): 17 POWDER, FOR SOLUTION ORAL at 22:34

## 2019-01-02 NOTE — PROGRESS NOTE ADULT - ASSESSMENT
ASSESSMENT:    ongoing dyspnea with minimal exertion with chronic hypoxic and hypercapnic respiratory failure due to very severe COPD with "exacerbation" - adequate oxygenation on a nasal canula in the setting of profound dyspnea suggests that alterations in the mechanics of breathing due to lung hyperinflation and obesity are likely playing a significant role in her shortness of breath - anxiety likely is also be playing a role - there is evidence of CAD without pulmonary hypertension on the CT scan which is without pulmonary emboli or pneumonia - cardiac catheterization last year revealed patent stents - ECHO has a preserved LVEF, no significant valvular heart disease and no pulmonary hypertension - resolved AURORA, hyperkalemia and hyponatremia - resolved respiratory acidosis on repeat ABG although the pCO2 level has increased - lower extremity weakness likely due to steroid induced myopathy perhaps exacerbated by statin use seems to be improving    extremity swelling/discomfort likely related to steroid induced fluid retention - no evidence of DVT on repeat upper or lower extremity Duplex examination - improving with TOMAS stockings, decreased steroid dose and diuretics    HTN/HLD/DM    CAD s/p PCI    PAD    PLAN/RECOMMENDATIONS:    BIPAP 12/5 with 40% FiO2 for sleep - on and off during the day for increased work of breathing or recurrent respiratory acidosis   oxygen supplementation to keep saturation greater than 92% using a nasal canula when off BIPAP  ABG in AM  sputum culture - no growth x2 - has completed a course of biaxin  home trilogy vent has been arranged with [facility]   albuterol/atrovent nebs q4h holding 2AM dose  pulmicort 0.5mg nebs q12h  singulair/theophylline - theophylline level is subtherapeutic - daliresp discontinued  prednisone 20mg daily - glucose control - nystatin - continue steroid taper given lack of improvement with systemic steroids and likely steroid related side effects  azithromycin TIW for antiinflammatory effects  cardiology evaluation noted  cardiac meds: ASA/imdur/lasix - off crestor with possible myopathy - off norvasc due to swelling - BP control adequate  diurese as tolerated by renal function and hemodynamics - watch for worsening metabolic alkalosis with loop diuretic use which could lead to worsening hypercapnia  GI/DVT prophylaxis - protoix/SQ lovenox  physical therapy daily  TOMAS stockings  holistic nurse evaluation  transfer to acute rehab when able to ambulate with assistance  outpatient evaluation for lung transplantation    Will follow with you. Plan of care discussed with the patient and her family at bedside and the dedicated floor NP and . Hopeful discharge to acute rehab on Monday.     David Fair MD, [facility] - [phone]  Pulmonary Medicine

## 2019-01-02 NOTE — PROGRESS NOTE ADULT - SUBJECTIVE AND OBJECTIVE BOX
[facility] - [facility]     PROGRESS NOTE    CHIEF COMPLAINT: chronic hypoxic/hypercapnic respiratory failure; COPD exacerbation; emphysema; dyspnea    INTERVAL HISTORY: in chair - depressed, frustrated and anxious; looks comfortable on a nasal canula but reports severe shortness of breath just with talking requiring BIPAP on and off during the day and night; ABG with worsening (but well compensated) respiratory acidosis; stood up and down several times with physical therapy earlier today; still unable to walk to the bathroom; less leg and arm swelling and improved leg strength on diuretics and reduced dose of steroids; resolved AURORA and hyponatremia; no cough, sputum production, chest congestion or wheeze; no fevers, chills or sweats; no chest pain/pressure or palpitations;    REVIEW OF SYSTEMS:  Constitutional: As per interval history  HEENT: Within normal limits  CV: As per interval history  Resp: As per interval history  GI: Within normal limits   : Within normal limits  Musculoskeletal: improving lower extremity weakness   Skin: Within normal limits  Neurological: Within normal limits  Psychiatric: angry and anxious  Endocrine: Within normal limits  Hematologic/Lymphatic: Within normal limits  Allergic/Immunologic: Within normal limits      MEDICATIONS:     Pulmonary "  ALBUTerol/ipratropium for Nebulization 3 milliLiter(s) Nebulizer <User Schedule>  buDESOnide   0.5 milliGRAM(s) Respule 0.5 milliGRAM(s) Inhalation every 12 hours  montelukast 10 milliGRAM(s) Oral daily  theophylline ER Capsule 400 milliGRAM(s) Oral daily      Anti-microbials:  azithromycin   Tablet 250 milliGRAM(s) Oral <User Schedule>  nystatin    Suspension [id number] Unit(s) Oral four times a day      Cardiovascular:  furosemide    Tablet 40 milliGRAM(s) Oral daily  isosorbide   mononitrate ER Tablet (IMDUR) 30 milliGRAM(s) Oral daily      Other:  aluminum hydroxide/magnesium hydroxide/simethicone Suspension 30 milliLiter(s) Oral every 6 hours PRN  artificial tears (preservative free) Ophthalmic Solution 1 Drop(s) Both EYES three times a day  aspirin enteric coated 81 milliGRAM(s) Oral daily  Biotene Dry Mouth Oral Rinse 5 milliLiter(s) Swish and Spit two times a day  bisacodyl Suppository 10 milliGRAM(s) Rectal daily PRN  cholecalciferol 2000 Unit(s) Oral daily  dextrose 40% Gel 15 Gram(s) Oral once PRN  dextrose 5%. 1000 milliLiter(s) IV Continuous <Continuous>  dextrose 50% Injectable 12.5 Gram(s) IV Push once  dextrose 50% Injectable 25 Gram(s) IV Push once  dextrose 50% Injectable 25 Gram(s) IV Push once  docusate sodium 100 milliGRAM(s) Oral three times a day  enoxaparin Injectable 40 milliGRAM(s) SubCutaneous every 24 hours  glucagon  Injectable 1 milliGRAM(s) IntraMuscular once PRN  insulin glargine Injectable (LANTUS) 7 Unit(s) SubCutaneous at bedtime  insulin lispro (HumaLOG) corrective regimen sliding scale   SubCutaneous three times a day before meals  insulin lispro (HumaLOG) corrective regimen sliding scale   SubCutaneous at bedtime  insulin lispro Injectable (HumaLOG) 4 Unit(s) SubCutaneous before lunch  insulin lispro Injectable (HumaLOG) 4 Unit(s) SubCutaneous before breakfast  insulin lispro Injectable (HumaLOG) 3 Unit(s) SubCutaneous with dinner  melatonin 1 milliGRAM(s) Oral at bedtime  multivitamin 1 Tablet(s) Oral daily  ondansetron Injectable 4 milliGRAM(s) IV Push every 6 hours PRN  pantoprazole    Tablet 40 milliGRAM(s) Oral before breakfast  polyethylene glycol 3350 17 Gram(s) Oral daily  predniSONE   Tablet 20 milliGRAM(s) Oral daily  senna 2 Tablet(s) Oral at bedtime  simethicone 80 milliGRAM(s) Chew once  sodium chloride 0.65% Nasal 1 Spray(s) Both Nostrils two times a day PRN        OBJECTIVE:    I&O's Detail    01 Jan 2019 07:01  -  02 Jan 2019 07:00  --------------------------------------------------------  IN:    Oral Fluid: 1310 mL  Total IN: 1310 mL    OUT:    Voided: 1400 mL  Total OUT: 1400 mL    Total NET: -90 mL      02 Jan 2019 07:01  -  02 Jan 2019 17:39  --------------------------------------------------------  IN:    Oral Fluid: 720 mL  Total IN: 720 mL    OUT:    Voided: 600 mL  Total OUT: 600 mL    Total NET: 120 mL    POCT Blood Glucose.: 77 mg/dL (02 Jan 2019 17:23)  POCT Blood Glucose.: 68 mg/dL (02 Jan 2019 17:21)  POCT Blood Glucose.: 139 mg/dL (02 Jan 2019 12:52)  POCT Blood Glucose.: 241 mg/dL (02 Jan 2019 08:56)  POCT Blood Glucose.: 153 mg/dL (02 Jan 2019 01:07)  POCT Blood Glucose.: 124 mg/dL (01 Jan 2019 21:13)      PHYSICAL EXAM:       ICU Vital Signs Last 24 Hrs  T(C): 36.3 (02 Jan 2019 12:58), Max: 37 (02 Jan 2019 04:10)  T(F): 97.3 (02 Jan 2019 12:58), Max: 98.6 (02 Jan 2019 04:10)  HR: 98 (02 Jan 2019 12:58) (87 - 104)  BP: 151/75 (02 Jan 2019 12:58) (129/84 - 151/75)  BP(mean): --  ABP: --  ABP(mean): --  RR: 20 (02 Jan 2019 12:58) (19 - 20)  SpO2: 98% (02 Jan 2019 12:58) (95% - 100%) on 4lpm nasal canula    General: Awake. Alert. Cooperative. No distress. Appears stated age. Obese. Cushingoid.  HEENT:  Atraumatic. Moonlike facies. Anicteric. Normal oral mucosa. PERRL. EOMI.   Neck: Supple. Trachea midline. Thyroid without enlargement/tenderness/nodules. No carotid bruit. No JVD.	  Cardiovascular: Regular rate and rhythm. Distant S1 S2. No murmurs, rubs or gallops.  Respiratory: Respirations unlabored with pursed lip breathing. Markedly decreased breath sounds throughout. No wheeze. No curvature.  Abdomen: Soft. Non-tender. Non-distended. No organomegaly. No masses. Normal bowel sounds. Obese.  Extremities: Warm to touch. No clubbing or cyanosis. Mild bilateral lower extremity edema up to the thigh. Decreased bilateral upper extremity swelling.  Pulses: Decreased lower extremity peripheral pulses.  Skin: No rashes or lesions. No ecchymoses. No cyanosis. Warm to touch. Multiple upper extremity excoriations  Lymph Nodes: Cervical, supraclavicular and axillary nodes normal  Neurological: Motor and sensory examination equal and normal. A and O x 3  Psychiatry: Appropriate mood and affect.        LABS:                        10.0   5.3   )-----------( 235      ( 01 Jan 2019 09:28 )             30.6     01-01    139  |  89<L>  |  30<H>  ----------------------------<  258<H>  4.3   |  39<H>  |  0.96    12-29    138  |  88<L>  |  29<H>  ----------------------------<  182<H>  3.8   |  39<H>  |  1.11    Ca      9.7      01-01    Ca      9.6      12-29     ABG - ( 01 Jan 2019 03:44 )  pH: 7.42  /  pCO2: 69    /  pO2: 165   / HCO3: 44    / Base Excess: 17.3  /  SaO2: 97        ABG - ( 31 Dec 2018 16:19 )  pH: 7.38  /  pCO2: 84    /  pO2: 26    / HCO3: 48    / Base Excess: 20.2  /  SaO2: 41        ABG - ( 18 Dec 2018 13:23 )  pH: 7.46  /  pCO2: 47    /  pO2: 88    / HCO3: 33    / Base Excess: 8.0   /  SaO2: 97        ABG - ( 16 Dec 2018 20:53 )  pH: 7.44  /  pCO2: 53    /  pO2: 173   / HCO3: 36    / Base Excess: 10.0  /  SaO2: 100       ABG - ( 13 Dec 2018 06:29 )  pH: 7.30  /  pCO2: 71    /  pO2: 182   / HCO3: 34    / Base Excess: 5.8   /  SaO2: 99        ABG - ( 13 Dec 2018 00:59 )  pH: 7.32  /  pCO2: 71    /  pO2: 75    / HCO3: 35    / Base Excess: 7.1   /  SaO2: 95        ABG - ( 11 Dec 2018 11:45 )  pH: 7.39  /  pCO2: 54    /  pO2: 98    / HCO3: 32    / Base Excess: 6.2   /  SaO2: 98        ABG - ( 09 Dec 2018 11:26 )  pH: 7.35  /  pCO2: 63    /  pO2: 145   / HCO3: 34    / Base Excess: 6.6   /  SaO2: 99      ABG - ( 09 Dec 2018 00:28 )  pH: 7.32  /  pCO2: 71    /  pO2: 124   / HCO3: 36    / Base Excess: 7.6   /  SaO2: 99        ABG - ( 08 Dec 2018 20:50 )  pH: 7.32  /  pCO2: 72    /  pO2: 80    / HCO3: 36    / Base Excess: 7.7   /  SaO2: 95        Serum Pro-Brain Natriuretic Peptide: 254 pg/mL (12-13 @ 14:00)    < from: Transthoracic Echocardiogram (12.07.18 @ 08:59) >    Patient name: GUY HARTMAN  YOB: 1960   Age: 58 (F)   MR#: [id number]  Study Date: 12/7/2018  Location: [address] [place], [place] [id number]: [facility]  Study quality: Technically good  Referring Physician: Allen ingram MD  BloodPressure: 120/80 mmHg  Height: 163 cm  Weight: 88 kg  BSA: 1.9 m2  ------------------------------------------------------------------------  PROCEDURE: Transthoracic echocardiogram with 2-D, M-Mode  and complete spectral and color flow Doppler.  INDICATION: Other forms of dyspnea (R06.09)  ------------------------------------------------------------------------  Dimensions:    Normal Values:  LA:     3.6    2.0 - 4.0 cm  Ao:     2.9    2.0 - 3.8 cm  SEPTUM: 0.9    0.6 - 1.2 cm  PWT:    0.8    0.6- 1.1 cm  LVIDd:  4.9    3.0 - 5.6 cm  LVIDs:  2.9    1.8 - 4.0 cm  Derived variables:  LVMI: 73 g/m2  RWT: 0.32  Fractional short: 40 %  EF (Teicholtz): 71 %  Doppler Peak Velocity (m/sec): AoV=1.2  ------------------------------------------------------------------------  Observations:  Mitral Valve: Normal mitral valve.  Aortic Valve/Aorta: Normal trileaflet aortic valve. Peak  transaortic valve gradient equals 6 mm Hg, mean transaortic  valve gradient equals 3 mm Hg, aortic valve velocity time  integral equals 22 cm. Trace aortic regurgitation.  Aortic Root: 2.9 cm.  Left Atrium: Normal left atrium.  LA volume index = 18  cc/m2.  Left Ventricle: Normal left ventricular systolic function.  No segmental wall motion abnormalities. Normal left  ventricular internal dimensions and wall thicknesses. Mild  diastolic dysfunction (Stage I).  Right Heart: Normal right atrium. Normal right ventricular  size and function. Normal tricuspid valve. Minimal  tricuspid regurgitation. Normal pulmonic valve.  Pericardium/Pleura: Normal pericardium with no pericardial  effusion.  Hemodynamic: Estimated right atrial pressure is 8 mm Hg.  Inadequate tricuspid regurgitation Doppler envelope  precludes estimation of RVSP.  ------------------------------------------------------------------------  Conclusions:  1. Normal mitral valve.  2. Normal trileaflet aortic valve. Peak transaortic valve  gradient equals 6 mm Hg, mean transaortic valve gradient  equals 3 mm Hg, aortic valve velocity time integral equals  22 cm. Trace aortic regurgitation.  3. Aortic Root: 2.9 cm.  4. Normal left atrium.  LA volume index = 18 cc/m2.  5. Normal left ventricular internal dimensions and wall  thicknesses.  6. Normal left ventricular systolic function. No segmental  wall motion abnormalities.  7. Mild diastolic dysfunction (Stage I).  8. Normal right ventricular size and function.  9. Inadequate tricuspid regurgitation Doppler envelope  precludes estimation of RVSP.  10. Normal tricuspid valve. Minimal tricuspid  regurgitation.  *** Compared with echocardiogram report of 2/18/2014, no  significant changes noted.  ------------------------------------------------------------------------  Confirmed on  12/7/2018 - 13:49:43 by Shefali Gómez M.D.  ------------------------------------------------------------------------    < end of copied text >  ---------------------------------------------------------------------------------------------------------------    MICROBIOLOGY:     Culture - Sputum . (12.07.18 @ 08:34)    Gram Stain:   Rare polymorphonuclear leukocytes per low power field  Rare Squamous epithelial cells per low power field  Numerous Gram Positive Cocci in Pairs and Chains per oil power field    Specimen Source: .Sputum Sputum    Culture Results:   Normal Respiratory Samaria present    Culture - Sputum . (12.05.18 @ 22:50)    Gram Stain:   Moderate polymorphonuclear leukocytes per low power field  Few Squamous epithelial cells per low power field  Moderate Gram Positive Cocci in Pairs and Chains per oil power field    Specimen Source: .Sputum Sputum    Culture Results:   Normal Respiratory Samaria present    Rapid Respiratory Viral Panel (11.30.18 @ 01:30)    Rapid RVP Result: NotDetec: The FilmArray RVP Rapid uses polymerase chain reaction (PCR) and melt  curve analysis to screen for adenovirus; coronavirus HKU1, NL63, 229E,  OC43; human metapneumovirus (hMPV); human enterovirus/rhinovirus  (Entero/RV); influenza A; influenza A/H1;influenza A/H3; influenza  A/H1-2009; influenza B; parainfluenza viruses 1, 2, 3, 4; respiratory  syncytial virus; Bordetella pertussis; Mycoplasma pneumoniae; and  Chlamydophila pneumoniae.    RADIOLOGY:  [x] Chest radiographs reviewed and interpreted by me    < from: [facility] Duplex Lower Ext Vein Scan, Bilat (12.30.18 @ 19:06) >    EXAM:  DUPLEX SCAN EXT VEINS LOWER BI                          PROCEDURE DATE:  12/30/2018      INTERPRETATION:  Clinical indication: Worsening edema.    Technique:  Grayscale, color Doppler and spectral Doppler ultrasound was   utilized toevaluate bilateral lower extremity deep venous system.      Comparison: 12/6/2018.    Findings: There is no thrombosis in bilateral common femoral veins,   femoral veins or popliteal veins. Visualized calf veins are patent. There   is bilateral lowerextremity soft tissue edema.    Impression:     No evidence of deep vein thrombosis in either lower extremity.    BROCK TOBIN M.D., ATTENDING RADIOLOGIST  This document has been electronically signed. Dec 30 2018  7:24PM     < end of copied text >  ---------------------------------------------------------------------------------------------------------------  < from: [facility] Duplex Upper Ext Vein Scan, Bilat (12.30.18 @ 19:05) >    EXAM:  DUPLEX SCAN EXT VEINS UPPER BI                          PROCEDURE DATE:  12/30/2018      INTERPRETATION:  Clinical information: Worsening bilateral upper   extremity edema.    Findings: Duplex evaluation of the deep venous system of the right and   left upper extremity was performed to include the brachiocephalic,   internal jugular, subclavian, axillary, brachial, radial and ulnar veins.   No echogenic thrombus is seen within the vein lumina. Complete coaptation   of those segments of vein accessible to compression was achieved.   Spontaneous venous flow with respiratory and cardiac pulsatility is noted   throughout.     The right innominate vein is patent. The left innominate vein was not   visualized.     The right and left basilic and cephalic veins are patent and compressible.    Impression: No duplex evidence of DVT in the right and left upper   extremity.    RAYMUNDO DUMONT M.D., ATTENDING RADIOLOGIST  This document has been electronically signed. Dec 31 2018  8:49AM     < end of copied text >  ---------------------------------------------------------------------------------------------------------------  < from: Xray Chest 1 View AP/PA (12.25.18 @ 18:04) >    EXAM:  XR CHEST AP OR PA 1V                          PROCEDURE DATE:  12/25/2018      INTERPRETATION:  CLINICAL INFORMATION: Shortness of breath. History of   COPD.    TECHNIQUE: AP view of the chest 12/25/2018.    COMPARISON: Chest radiograph 12/13/2018.     FINDINGS:     The lungs are clear. There is no pneumothorax and no pleural effusions.   Cardiac size is not accurately evaluated in this projection.  The visualized osseous structures demonstrate no acute abnormality.    IMPRESSION:   Clear lungs.    SUSANA HODGES M.D., RADIOLOGY RESIDENT  This document has been electronically signed.  SVETLANA SANDOVAL M.D., ATTENDING RADIOLOGIST  This document has been electronically signed. Dec 26 2018 10:59AM      < end of copied text >  ---------------------------------------------------------------------------------------------------------------  < from: CT Angio Chest w/ IV Cont (12.04.18 @ 16:30) >    EXAM:  CT ANGIO CHEST (W)AW IC                          PROCEDURE DATE:  12/04/2018      INTERPRETATION:  CT CHEST WITH CONTRAST    INDICATION: Known COPD not responding to steroids and bronchodilators.   Progressively worsening dyspnea. Evaluate for pulmonary embolism.    TECHNIQUE: Enhanced helical images were obtained of the chest. Coronal   and sagittal images were reconstructed. Maximum intensity projection   images were generated. Images were obtained after the uneventful   administration of 60 cc nonionic intravenous Omnipaque 350.  40 cc of   Omnipaque 350 was discarded.    COMPARISON: CT chest 2/18/2014.    FINDINGS:     Lungs And Airways: Emphysematous changes of the lung.      The airways are unremarkable.      Pleura: No pneumothorax. No pleural effusion.    Mediastinum: There are no enlarged chest lymph nodes. The visualized   portion of the thyroid gland is unremarkable.       Heart and Vasculature: No pulmonary embolism.    The main pulmonary artery is normal in caliber. No thoracic aortic   aneurysm or dissection. Atheromatous disease of the aorta.    The heart is normal in size.  There is no pericardial effusion.   Coronary artery disease with calcified plaque involving the right and   left main coronary arteries and the left anterior descending artery.      Upper Abdomen: The upper abdomen is unremarkable.    Bones And Soft Tissues: Degenerative changes of the spine.  The soft   tissues are unremarkable.      IMPRESSION:   1.  No pulmonary embolism.  2. Emphysematous changes of the lung.    DIANA MEDINA M.D., RADIOLOGY RESIDENT  This document has been electronically signed.  JEYSON SANCHEZ M.D., ATTENDING RADIOLOGIST  This document has been electronically signed. Dec  4 2018  5:21PM      < end of copied text >  ---------------------------------------------------------------------------------------------------------------  SPIROMETRY:     FEV1 0.56 liters - 22% predicted  FVC 1.73 liters - 52% predicted  FEV1% 32    c/w very severe obstructive lung disease

## 2019-01-02 NOTE — CHART NOTE - NSCHARTNOTEFT_GEN_A_CORE
Chart note- endocrine attg chart note    POC blood glucose and insulin use reviewed.  AM hyperglycemia for two mornings  will increase Lantus to 7 units and keep lispro the same      inform endocrine of hypoglycemia or persistent hyperglycemia episodes as changes in pts insulin regimen will need to be made.   notify endocrine if any plans to be NPO/diet changes as this will also affect insulin regimen.      Loreta Karimi MD  Pager [phone] ([facility])/ [phone] ([facility]) [please provide 10 digit call back number]  Nights and weekends: [phone]  Please note that this patient may be followed by a different provider tomorrow. If no answer or after hours, please contact [phone].  For final dc reccomendations, please call [phone] or page the endocrine fellow on call.

## 2019-01-02 NOTE — PROGRESS NOTE ADULT - PROBLEM SELECTOR PLAN 1
c/w Prednisone 20mg po qd   Continue Pulmicort, Duoneb ATC   c/w Theophylline, Singulair.  Completed 7 days of biaxin   Home Trilogy vent arranged by pulmonary.  c/w intermittent bipap ; alternating with 5L NC  C/w daliresp  Pulm follow up with [facility]/transplant list  cw azithromycin   D/w pulmonary plan for discharge to acute pulmonary rehab  Appreciate pm&r recs

## 2019-01-02 NOTE — CONSULT NOTE ADULT - ASSESSMENT
58y pending indirect laryngoscopy 58y F PMH of COPD former smoker, on home O2 3L, HTN, HLD, CAD s/p 6 stents, Type 2 DM, PVD, p/w progressive worsening dyspnea x 2 weeks a/w COPD exacerbation seen and examined prior to lung transplant by ENT. Laryngoscopy WNL, vocal cords mobile, airway widely patent. No gross abnormalities seen.

## 2019-01-02 NOTE — PROGRESS NOTE ADULT - ASSESSMENT
58 F PMH COPD on home O2 3L, HTN, HLD, CAD s/p x6 stents, T2DM, pHTN, PVD, p/w progressive worsening dyspnea x 2 weeks now complicated by chronic progressive hypoxic respiratory failure.     1. Chronic progressive hypoxic respiratory failure  multifactorial in the setting of worsening COPD, CAD, chronic diastolic CHF, pulmonary HTN   bl feet edema improved, continue lasix 40mg PO daily   UE/ LE dopplers negative for DVT   echo with normal LV function, mild diastolic dysfunction, inadequate tricuspid regurg   cv stable no chest pain or sob   bipap PRN  continue dueonebs, inhaled steroids, singulair/theophylline/daliresp, pulm f/u   EKG reviewed, corrected QT WNL (aprox 462), on azithromycin    2. CAD s/p PCI  cv stable  continue asa, imdur    3. COPD   remains on bipap  continue Duoneb inhaled steroids, singulair/theophylline/daliresp, pulm f/u     4. hyponatremia/hyperkalemia  trend bmp, med f/u  dvt ppx   dc planning per primary team; Transfer to Pulmonary Rehab

## 2019-01-02 NOTE — CONSULT NOTE ADULT - SUBJECTIVE AND OBJECTIVE BOX
CC: upper airway eval     HPI: 58-year-old woman with PMH of COPD former smoker, on home O2 3L, HTN, HLD, CAD s/p 6 stents, Type 2 DM, PVD, p/w progressive worsening dyspnea x 2 weeks a/w COPD exacerbation, ENT called for upper airway eval prior to lung transplant.       PAST MEDICAL & SURGICAL HISTORY:  Hyperlipemia  Chronic sinusitis  Raynaud phenomenon  HTN (hypertension)  DM (diabetes mellitus)  Claustrophobia  COPD (chronic obstructive pulmonary disease)  S/P tonsillectomy    Allergies    No Known Allergies    Intolerances      MEDICATIONS  (STANDING):  ALBUTerol/ipratropium for Nebulization 3 milliLiter(s) Nebulizer <User Schedule>  artificial tears (preservative free) Ophthalmic Solution 1 Drop(s) Both EYES three times a day  aspirin enteric coated 81 milliGRAM(s) Oral daily  azithromycin   Tablet 250 milliGRAM(s) Oral <User Schedule>  Biotene Dry Mouth Oral Rinse 5 milliLiter(s) Swish and Spit two times a day  buDESOnide   0.5 milliGRAM(s) Respule 0.5 milliGRAM(s) Inhalation every 12 hours  cholecalciferol 2000 Unit(s) Oral daily  dextrose 5%. 1000 milliLiter(s) (50 mL/Hr) IV Continuous <Continuous>  dextrose 50% Injectable 12.5 Gram(s) IV Push once  dextrose 50% Injectable 25 Gram(s) IV Push once  dextrose 50% Injectable 25 Gram(s) IV Push once  docusate sodium 100 milliGRAM(s) Oral three times a day  enoxaparin Injectable 40 milliGRAM(s) SubCutaneous every 24 hours  furosemide    Tablet 40 milliGRAM(s) Oral daily  insulin glargine Injectable (LANTUS) 7 Unit(s) SubCutaneous at bedtime  insulin lispro (HumaLOG) corrective regimen sliding scale   SubCutaneous three times a day before meals  insulin lispro (HumaLOG) corrective regimen sliding scale   SubCutaneous at bedtime  insulin lispro Injectable (HumaLOG) 4 Unit(s) SubCutaneous before lunch  insulin lispro Injectable (HumaLOG) 4 Unit(s) SubCutaneous before breakfast  insulin lispro Injectable (HumaLOG) 3 Unit(s) SubCutaneous with dinner  isosorbide   mononitrate ER Tablet (IMDUR) 30 milliGRAM(s) Oral daily  melatonin 1 milliGRAM(s) Oral at bedtime  montelukast 10 milliGRAM(s) Oral daily  multivitamin 1 Tablet(s) Oral daily  nystatin    Suspension 306479 Unit(s) Oral four times a day  pantoprazole    Tablet 40 milliGRAM(s) Oral before breakfast  polyethylene glycol 3350 17 Gram(s) Oral daily  predniSONE   Tablet 20 milliGRAM(s) Oral daily  senna 2 Tablet(s) Oral at bedtime  simethicone 80 milliGRAM(s) Chew once  theophylline ER Capsule 400 milliGRAM(s) Oral daily    MEDICATIONS  (PRN):  aluminum hydroxide/magnesium hydroxide/simethicone Suspension 30 milliLiter(s) Oral every 6 hours PRN Dyspepsia  bisacodyl Suppository 10 milliGRAM(s) Rectal daily PRN Constipation  dextrose 40% Gel 15 Gram(s) Oral once PRN Blood Glucose LESS THAN 70 milliGRAM(s)/deciliter  glucagon  Injectable 1 milliGRAM(s) IntraMuscular once PRN Glucose LESS THAN 70 milligrams/deciliter  ondansetron Injectable 4 milliGRAM(s) IV Push every 6 hours PRN Nausea and/or Vomiting  sodium chloride 0.65% Nasal 1 Spray(s) Both Nostrils two times a day PRN Nasal Congestion      Social History: former smoker.     Family history: Pt denies any sign FHx    ROS:   ENT: all negative except as noted in HPI   CV: denies palpitations  Pulm: denies SOB, cough, hemoptysis  GI: denies change in apetite, indigestion, n/v  : denies pertinent urinary symptoms, urgency  Neuro: denies numbness/tingling, loss of sensation  Psych: denies anxiety  MS: denies muscle weakness, instability  Heme: denies easy bruising or bleeding  Endo: denies heat/cold intolerance, excessive sweating  Vascular: denies LE edema    Vital Signs Last 24 Hrs  T(C): 36.3 (02 Jan 2019 12:58), Max: 37 (02 Jan 2019 04:10)  T(F): 97.3 (02 Jan 2019 12:58), Max: 98.6 (02 Jan 2019 04:10)  HR: 98 (02 Jan 2019 12:58) (87 - 104)  BP: 151/75 (02 Jan 2019 12:58) (129/84 - 151/75)  BP(mean): --  RR: 20 (02 Jan 2019 12:58) (19 - 20)  SpO2: 98% (02 Jan 2019 12:58) (95% - 100%)                          10.0   5.3   )-----------( 235      ( 01 Jan 2019 09:28 )             30.6    01-01    139  |  89<L>  |  30<H>  ----------------------------<  258<H>  4.3   |  39<H>  |  0.96    Ca    9.7      01 Jan 2019 10:35         PHYSICAL EXAM:  Gen: NAD  Skin: No rashes, bruises, or lesions  Head: Normocephalic, Atraumatic  Face: no edema, erythema, or fluctuance. Parotid glands soft without mass  Eyes: no scleral injection  Nose: Nares bilaterally patent, no discharge  Mouth: No Stridor / Drooling / Trismus.  Mucosa moist, tongue/uvula midline, oropharynx clear  Neck: Flat, supple, no lymphadenopathy, trachea midline, no masses  Lymphatic: No lymphadenopathy  Resp: breathing easily, no stridor  CV: no peripheral edema/cyanosis  GI: nondistended   Peripheral vascular: no JVD or edema  Neuro: facial nerve intact, no facial droop    Fiberoptic Indirect laryngoscopy:  (Scope #2 used)- pending CC: upper airway eval     HPI: 58-year-old woman with PMH of COPD former smoker, on home O2 3L, HTN, HLD, CAD s/p 6 stents, Type 2 DM, PVD, p/w progressive worsening dyspnea x 2 weeks a/w COPD exacerbation, ENT called for upper airway eval prior to lung transplant.       PAST MEDICAL & SURGICAL HISTORY:  Hyperlipemia  Chronic sinusitis  Raynaud phenomenon  HTN (hypertension)  DM (diabetes mellitus)  Claustrophobia  COPD (chronic obstructive pulmonary disease)  S/P tonsillectomy    Allergies    No Known Allergies    Intolerances      MEDICATIONS  (STANDING):  ALBUTerol/ipratropium for Nebulization 3 milliLiter(s) Nebulizer <User Schedule>  artificial tears (preservative free) Ophthalmic Solution 1 Drop(s) Both EYES three times a day  aspirin enteric coated 81 milliGRAM(s) Oral daily  azithromycin   Tablet 250 milliGRAM(s) Oral <User Schedule>  Biotene Dry Mouth Oral Rinse 5 milliLiter(s) Swish and Spit two times a day  buDESOnide   0.5 milliGRAM(s) Respule 0.5 milliGRAM(s) Inhalation every 12 hours  cholecalciferol 2000 Unit(s) Oral daily  dextrose 5%. 1000 milliLiter(s) (50 mL/Hr) IV Continuous <Continuous>  dextrose 50% Injectable 12.5 Gram(s) IV Push once  dextrose 50% Injectable 25 Gram(s) IV Push once  dextrose 50% Injectable 25 Gram(s) IV Push once  docusate sodium 100 milliGRAM(s) Oral three times a day  enoxaparin Injectable 40 milliGRAM(s) SubCutaneous every 24 hours  furosemide    Tablet 40 milliGRAM(s) Oral daily  insulin glargine Injectable (LANTUS) 7 Unit(s) SubCutaneous at bedtime  insulin lispro (HumaLOG) corrective regimen sliding scale   SubCutaneous three times a day before meals  insulin lispro (HumaLOG) corrective regimen sliding scale   SubCutaneous at bedtime  insulin lispro Injectable (HumaLOG) 4 Unit(s) SubCutaneous before lunch  insulin lispro Injectable (HumaLOG) 4 Unit(s) SubCutaneous before breakfast  insulin lispro Injectable (HumaLOG) 3 Unit(s) SubCutaneous with dinner  isosorbide   mononitrate ER Tablet (IMDUR) 30 milliGRAM(s) Oral daily  melatonin 1 milliGRAM(s) Oral at bedtime  montelukast 10 milliGRAM(s) Oral daily  multivitamin 1 Tablet(s) Oral daily  nystatin    Suspension 480350 Unit(s) Oral four times a day  pantoprazole    Tablet 40 milliGRAM(s) Oral before breakfast  polyethylene glycol 3350 17 Gram(s) Oral daily  predniSONE   Tablet 20 milliGRAM(s) Oral daily  senna 2 Tablet(s) Oral at bedtime  simethicone 80 milliGRAM(s) Chew once  theophylline ER Capsule 400 milliGRAM(s) Oral daily    MEDICATIONS  (PRN):  aluminum hydroxide/magnesium hydroxide/simethicone Suspension 30 milliLiter(s) Oral every 6 hours PRN Dyspepsia  bisacodyl Suppository 10 milliGRAM(s) Rectal daily PRN Constipation  dextrose 40% Gel 15 Gram(s) Oral once PRN Blood Glucose LESS THAN 70 milliGRAM(s)/deciliter  glucagon  Injectable 1 milliGRAM(s) IntraMuscular once PRN Glucose LESS THAN 70 milligrams/deciliter  ondansetron Injectable 4 milliGRAM(s) IV Push every 6 hours PRN Nausea and/or Vomiting  sodium chloride 0.65% Nasal 1 Spray(s) Both Nostrils two times a day PRN Nasal Congestion      Social History: former smoker.     Family history: Pt denies any sign FHx    ROS:   ENT: all negative except as noted in HPI   CV: denies palpitations  Pulm: denies SOB, cough, hemoptysis  GI: denies change in apetite, indigestion, n/v  : denies pertinent urinary symptoms, urgency  Neuro: denies numbness/tingling, loss of sensation  Psych: denies anxiety  MS: denies muscle weakness, instability  Heme: denies easy bruising or bleeding  Endo: denies heat/cold intolerance, excessive sweating  Vascular: denies LE edema    Vital Signs Last 24 Hrs  T(C): 36.3 (02 Jan 2019 12:58), Max: 37 (02 Jan 2019 04:10)  T(F): 97.3 (02 Jan 2019 12:58), Max: 98.6 (02 Jan 2019 04:10)  HR: 98 (02 Jan 2019 12:58) (87 - 104)  BP: 151/75 (02 Jan 2019 12:58) (129/84 - 151/75)  BP(mean): --  RR: 20 (02 Jan 2019 12:58) (19 - 20)  SpO2: 98% (02 Jan 2019 12:58) (95% - 100%)                          10.0   5.3   )-----------( 235      ( 01 Jan 2019 09:28 )             30.6    01-01    139  |  89<L>  |  30<H>  ----------------------------<  258<H>  4.3   |  39<H>  |  0.96    Ca    9.7      01 Jan 2019 10:35         PHYSICAL EXAM:  Gen: NAD  Skin: No rashes, bruises, or lesions  Head: Normocephalic, Atraumatic  Face: no edema, erythema, or fluctuance. Parotid glands soft without mass  Eyes: no scleral injection  Nose: Nares bilaterally patent, no discharge  Mouth: No Stridor / Drooling / Trismus.  Mucosa moist, tongue/uvula midline, oropharynx clear  Neck: Flat, supple, no lymphadenopathy, trachea midline, no masses  Lymphatic: No lymphadenopathy  Resp: breathing easily, no stridor  CV: no peripheral edema/cyanosis  GI: nondistended   Peripheral vascular: no JVD or edema  Neuro: facial nerve intact, no facial droop    Fiberoptic Indirect laryngoscopy:  (Scope #2 used)- Reason for Laryngoscopy: Upper airway evaluation prior to lung transplant    Patient was unable to cooperate with mirror.  Nasopharynx, oropharynx, and hypopharynx clear, no bleeding. Tongue base, posterior pharyngeal wall, vallecula, epiglottis, and subglottis appear normal. No erythema, edema, pooling of secretions, masses or lesions. Airway patent, no foreign body visualized. No glottic/supraglottic edema. True vocal cords, arytenoids, vestibular folds, ventricles, pyriform sinuses, and aryepiglottic folds appear normal bilaterally. Vocal cords mobile with good contact b/l. CC: upper airway eval     HPI: 58-year-old woman with PMH of COPD former smoker, on home O2 3L, HTN, HLD, CAD s/p 6 stents, Type 2 DM, PVD, p/w progressive worsening dyspnea x 2 weeks a/w COPD exacerbation, ENT called for upper airway eval prior to lung transplant. Difficulty breathing with upper airway noises at times. No modifying factors.    PAST MEDICAL & SURGICAL HISTORY:  Hyperlipemia  Chronic sinusitis  Raynaud phenomenon  HTN (hypertension)  DM (diabetes mellitus)  Claustrophobia  COPD (chronic obstructive pulmonary disease)  S/P tonsillectomy    Allergies    No Known Allergies    Intolerances      MEDICATIONS  (STANDING):  ALBUTerol/ipratropium for Nebulization 3 milliLiter(s) Nebulizer <User Schedule>  artificial tears (preservative free) Ophthalmic Solution 1 Drop(s) Both EYES three times a day  aspirin enteric coated 81 milliGRAM(s) Oral daily  azithromycin   Tablet 250 milliGRAM(s) Oral <User Schedule>  Biotene Dry Mouth Oral Rinse 5 milliLiter(s) Swish and Spit two times a day  buDESOnide   0.5 milliGRAM(s) Respule 0.5 milliGRAM(s) Inhalation every 12 hours  cholecalciferol 2000 Unit(s) Oral daily  dextrose 5%. 1000 milliLiter(s) (50 mL/Hr) IV Continuous <Continuous>  dextrose 50% Injectable 12.5 Gram(s) IV Push once  dextrose 50% Injectable 25 Gram(s) IV Push once  dextrose 50% Injectable 25 Gram(s) IV Push once  docusate sodium 100 milliGRAM(s) Oral three times a day  enoxaparin Injectable 40 milliGRAM(s) SubCutaneous every 24 hours  furosemide    Tablet 40 milliGRAM(s) Oral daily  insulin glargine Injectable (LANTUS) 7 Unit(s) SubCutaneous at bedtime  insulin lispro (HumaLOG) corrective regimen sliding scale   SubCutaneous three times a day before meals  insulin lispro (HumaLOG) corrective regimen sliding scale   SubCutaneous at bedtime  insulin lispro Injectable (HumaLOG) 4 Unit(s) SubCutaneous before lunch  insulin lispro Injectable (HumaLOG) 4 Unit(s) SubCutaneous before breakfast  insulin lispro Injectable (HumaLOG) 3 Unit(s) SubCutaneous with dinner  isosorbide   mononitrate ER Tablet (IMDUR) 30 milliGRAM(s) Oral daily  melatonin 1 milliGRAM(s) Oral at bedtime  montelukast 10 milliGRAM(s) Oral daily  multivitamin 1 Tablet(s) Oral daily  nystatin    Suspension 156500 Unit(s) Oral four times a day  pantoprazole    Tablet 40 milliGRAM(s) Oral before breakfast  polyethylene glycol 3350 17 Gram(s) Oral daily  predniSONE   Tablet 20 milliGRAM(s) Oral daily  senna 2 Tablet(s) Oral at bedtime  simethicone 80 milliGRAM(s) Chew once  theophylline ER Capsule 400 milliGRAM(s) Oral daily    MEDICATIONS  (PRN):  aluminum hydroxide/magnesium hydroxide/simethicone Suspension 30 milliLiter(s) Oral every 6 hours PRN Dyspepsia  bisacodyl Suppository 10 milliGRAM(s) Rectal daily PRN Constipation  dextrose 40% Gel 15 Gram(s) Oral once PRN Blood Glucose LESS THAN 70 milliGRAM(s)/deciliter  glucagon  Injectable 1 milliGRAM(s) IntraMuscular once PRN Glucose LESS THAN 70 milligrams/deciliter  ondansetron Injectable 4 milliGRAM(s) IV Push every 6 hours PRN Nausea and/or Vomiting  sodium chloride 0.65% Nasal 1 Spray(s) Both Nostrils two times a day PRN Nasal Congestion      Social History: former smoker.     Family history: Pt denies any sign FHx    ROS:   ENT: all negative except as noted in HPI   CV: denies palpitations  Pulm: denies SOB, cough, hemoptysis  GI: denies change in apetite, indigestion, n/v  : denies pertinent urinary symptoms, urgency  Neuro: denies numbness/tingling, loss of sensation  Psych: denies anxiety  MS: denies muscle weakness, instability  Heme: denies easy bruising or bleeding  Endo: denies heat/cold intolerance, excessive sweating  Vascular: denies LE edema    Vital Signs Last 24 Hrs  T(C): 36.3 (02 Jan 2019 12:58), Max: 37 (02 Jan 2019 04:10)  T(F): 97.3 (02 Jan 2019 12:58), Max: 98.6 (02 Jan 2019 04:10)  HR: 98 (02 Jan 2019 12:58) (87 - 104)  BP: 151/75 (02 Jan 2019 12:58) (129/84 - 151/75)  BP(mean): --  RR: 20 (02 Jan 2019 12:58) (19 - 20)  SpO2: 98% (02 Jan 2019 12:58) (95% - 100%)                          10.0   5.3   )-----------( 235      ( 01 Jan 2019 09:28 )             30.6    01-01    139  |  89<L>  |  30<H>  ----------------------------<  258<H>  4.3   |  39<H>  |  0.96    Ca    9.7      01 Jan 2019 10:35         PHYSICAL EXAM:  Gen: NAD  Skin: No rashes, bruises, or lesions  Head: Normocephalic, Atraumatic  Face: no edema, erythema, or fluctuance. Parotid glands soft without mass  Eyes: no scleral injection  Nose: Nares bilaterally patent, no discharge  Mouth: No Stridor / Drooling / Trismus.  Mucosa moist, tongue/uvula midline, oropharynx clear  Neck: Flat, supple, no lymphadenopathy, trachea midline, no masses  Lymphatic: No lymphadenopathy  Resp: breathing easily, no stridor  CV: no peripheral edema/cyanosis  GI: nondistended   Peripheral vascular: no JVD or edema  Neuro: facial nerve intact, no facial droop    Fiberoptic Indirect laryngoscopy:  (Scope #2 used)- Reason for Laryngoscopy: Upper airway evaluation prior to lung transplant    Patient was unable to cooperate with mirror.  Nasopharynx, oropharynx, and hypopharynx clear, no bleeding. Tongue base, posterior pharyngeal wall, vallecula, epiglottis, and subglottis appear normal. No erythema, edema, pooling of secretions, masses or lesions. Airway patent, no foreign body visualized. No glottic/supraglottic edema. True vocal cords, arytenoids, vestibular folds, ventricles, pyriform sinuses, and aryepiglottic folds appear normal bilaterally. Vocal cords mobile with good contact b/l.

## 2019-01-02 NOTE — PROGRESS NOTE ADULT - PROBLEM SELECTOR PLAN 3
A1C 7.2  -c/w Lantus 6 units QHS  -c/w Humalog  4 lunch-3 dinner  w/meals   - Continue to monitor blood sugars.  - Endocrine following

## 2019-01-02 NOTE — PROVIDER CONTACT NOTE (OTHER) - SITUATION
pt has redness in right eye. pt also complaining of nausea that is not relieved much after getting ondansetron.

## 2019-01-02 NOTE — PROGRESS NOTE ADULT - ASSESSMENT
58-year-old woman with PMH of COPD on home O2 3L, HTN, HLD, CAD s/p 6 stents, Type 2 DM, PVD, p/w progressive worsening dyspnea x 2 weeks a/w COPD exacerbation.

## 2019-01-02 NOTE — PROGRESS NOTE ADULT - SUBJECTIVE AND OBJECTIVE BOX
Patient is a 58y old  Female who presents with a chief complaint of dyspnea (02 Jan 2019 10:52)      SUBJECTIVE / OVERNIGHT EVENTS: Pt on bipap, still with swelling in LE, UE swelling better, nodding yes when asked if she had bm     MEDICATIONS  (STANDING):  ALBUTerol/ipratropium for Nebulization 3 milliLiter(s) Nebulizer <User Schedule>  artificial tears (preservative free) Ophthalmic Solution 1 Drop(s) Both EYES three times a day  aspirin enteric coated 81 milliGRAM(s) Oral daily  azithromycin   Tablet 250 milliGRAM(s) Oral <User Schedule>  Biotene Dry Mouth Oral Rinse 5 milliLiter(s) Swish and Spit two times a day  buDESOnide   0.5 milliGRAM(s) Respule 0.5 milliGRAM(s) Inhalation every 12 hours  cholecalciferol 2000 Unit(s) Oral daily  dextrose 5%. 1000 milliLiter(s) (50 mL/Hr) IV Continuous <Continuous>  dextrose 50% Injectable 12.5 Gram(s) IV Push once  dextrose 50% Injectable 25 Gram(s) IV Push once  dextrose 50% Injectable 25 Gram(s) IV Push once  docusate sodium 100 milliGRAM(s) Oral three times a day  enoxaparin Injectable 40 milliGRAM(s) SubCutaneous every 24 hours  furosemide    Tablet 40 milliGRAM(s) Oral daily  insulin glargine Injectable (LANTUS) 6 Unit(s) SubCutaneous at bedtime  insulin lispro (HumaLOG) corrective regimen sliding scale   SubCutaneous three times a day before meals  insulin lispro (HumaLOG) corrective regimen sliding scale   SubCutaneous at bedtime  insulin lispro Injectable (HumaLOG) 4 Unit(s) SubCutaneous before breakfast  insulin lispro Injectable (HumaLOG) 3 Unit(s) SubCutaneous with dinner  insulin lispro Injectable (HumaLOG) 4 Unit(s) SubCutaneous before lunch  isosorbide   mononitrate ER Tablet (IMDUR) 30 milliGRAM(s) Oral daily  melatonin 1 milliGRAM(s) Oral at bedtime  montelukast 10 milliGRAM(s) Oral daily  multivitamin 1 Tablet(s) Oral daily  nystatin    Suspension 635086 Unit(s) Oral four times a day  ondansetron Injectable 4 milliGRAM(s) IV Push once  pantoprazole    Tablet 40 milliGRAM(s) Oral before breakfast  polyethylene glycol 3350 17 Gram(s) Oral daily  predniSONE   Tablet 20 milliGRAM(s) Oral daily  senna 2 Tablet(s) Oral at bedtime  simethicone 80 milliGRAM(s) Chew once  theophylline ER Capsule 400 milliGRAM(s) Oral daily    MEDICATIONS  (PRN):  bisacodyl Suppository 10 milliGRAM(s) Rectal daily PRN Constipation  dextrose 40% Gel 15 Gram(s) Oral once PRN Blood Glucose LESS THAN 70 milliGRAM(s)/deciliter  glucagon  Injectable 1 milliGRAM(s) IntraMuscular once PRN Glucose LESS THAN 70 milligrams/deciliter  ondansetron Injectable 4 milliGRAM(s) IV Push every 8 hours PRN Nausea and/or Vomiting  sodium chloride 0.65% Nasal 1 Spray(s) Both Nostrils two times a day PRN Nasal Congestion        CAPILLARY BLOOD GLUCOSE      POCT Blood Glucose.: 241 mg/dL (02 Jan 2019 08:56)  POCT Blood Glucose.: 153 mg/dL (02 Jan 2019 01:07)  POCT Blood Glucose.: 124 mg/dL (01 Jan 2019 21:13)  POCT Blood Glucose.: 137 mg/dL (01 Jan 2019 17:30)  POCT Blood Glucose.: 264 mg/dL (01 Jan 2019 12:55)    I&O's Summary    01 Jan 2019 07:01  -  02 Jan 2019 07:00  --------------------------------------------------------  IN: 1310 mL / OUT: 1400 mL / NET: -90 mL    02 Jan 2019 07:01  -  02 Jan 2019 11:35  --------------------------------------------------------  IN: 240 mL / OUT: 200 mL / NET: 40 mL        PHYSICAL EXAM:  GENERAL: NAD, well-developed  HEAD:  Atraumatic, Normocephalic  EYES: conjunctiva and sclera clear  CHEST/LUNG: Clear to auscultation bilaterally; No wheeze  HEART: Regular rate and rhythm; S1S2  ABDOMEN: Soft, Nontender, Nondistended; Bowel sounds present  EXTREMITIES:  +1 UE edema, +1-2 LE edema  PSYCH: AAOx3  NEUROLOGY: non-focal  SKIN: No rashes or lesions    LABS:                        10.0   5.3   )-----------( 235      ( 01 Jan 2019 09:28 )             30.6     01-01    139  |  89<L>  |  30<H>  ----------------------------<  258<H>  4.3   |  39<H>  |  0.96    Ca    9.7      01 Jan 2019 10:35                RADIOLOGY & ADDITIONAL TESTS:    Imaging Personally Reviewed:    Consultant(s) Notes Reviewed:  cardio, pulm     Care Discussed with Consultants/Other Providers:

## 2019-01-02 NOTE — CONSULT NOTE ADULT - PROBLEM SELECTOR PROBLEM 1
COPD exacerbation
Hyperkalemia
Shortness of breath
Type 2 diabetes mellitus with hyperglycemia, without long-term current use of insulin

## 2019-01-02 NOTE — PROGRESS NOTE ADULT - SUBJECTIVE AND OBJECTIVE BOX
CARDIOLOGY FOLLOW UP - Dr. Brunson    CC no cp/sob       PHYSICAL EXAM:  T(C): 37 (01-02-19 @ 04:10), Max: 37 (01-02-19 @ 04:10)  HR: 87 (01-02-19 @ 06:28) (87 - 104)  BP: 150/86 (01-02-19 @ 04:10) (129/84 - 150/86)  RR: 19 (01-02-19 @ 04:10) (19 - 20)  SpO2: 100% (01-02-19 @ 06:28) (95% - 100%)  Wt(kg): --  I&O's Summary    01 Jan 2019 07:01  -  02 Jan 2019 07:00  --------------------------------------------------------  IN: 1310 mL / OUT: 1400 mL / NET: -90 mL    02 Jan 2019 07:01  -  02 Jan 2019 10:52  --------------------------------------------------------  IN: 240 mL / OUT: 200 mL / NET: 40 mL        Appearance: Normal	  Cardiovascular: Normal S1 S2,RRR, No JVD, No murmurs  Respiratory: diminished   Gastrointestinal:  Soft, Non-tender, + BS	  Extremities: Normal range of motion, No clubbing, cyanosis or edema        MEDICATIONS  (STANDING):  ALBUTerol/ipratropium for Nebulization 3 milliLiter(s) Nebulizer <User Schedule>  artificial tears (preservative free) Ophthalmic Solution 1 Drop(s) Both EYES three times a day  aspirin enteric coated 81 milliGRAM(s) Oral daily  azithromycin   Tablet 250 milliGRAM(s) Oral <User Schedule>  Biotene Dry Mouth Oral Rinse 5 milliLiter(s) Swish and Spit two times a day  buDESOnide   0.5 milliGRAM(s) Respule 0.5 milliGRAM(s) Inhalation every 12 hours  cholecalciferol 2000 Unit(s) Oral daily  dextrose 5%. 1000 milliLiter(s) (50 mL/Hr) IV Continuous <Continuous>  dextrose 50% Injectable 12.5 Gram(s) IV Push once  dextrose 50% Injectable 25 Gram(s) IV Push once  dextrose 50% Injectable 25 Gram(s) IV Push once  docusate sodium 100 milliGRAM(s) Oral three times a day  enoxaparin Injectable 40 milliGRAM(s) SubCutaneous every 24 hours  furosemide    Tablet 40 milliGRAM(s) Oral daily  insulin glargine Injectable (LANTUS) 6 Unit(s) SubCutaneous at bedtime  insulin lispro (HumaLOG) corrective regimen sliding scale   SubCutaneous three times a day before meals  insulin lispro (HumaLOG) corrective regimen sliding scale   SubCutaneous at bedtime  insulin lispro Injectable (HumaLOG) 4 Unit(s) SubCutaneous before breakfast  insulin lispro Injectable (HumaLOG) 3 Unit(s) SubCutaneous with dinner  insulin lispro Injectable (HumaLOG) 4 Unit(s) SubCutaneous before lunch  isosorbide   mononitrate ER Tablet (IMDUR) 30 milliGRAM(s) Oral daily  melatonin 1 milliGRAM(s) Oral at bedtime  montelukast 10 milliGRAM(s) Oral daily  multivitamin 1 Tablet(s) Oral daily  nystatin    Suspension 624235 Unit(s) Oral four times a day  ondansetron Injectable 4 milliGRAM(s) IV Push once  pantoprazole    Tablet 40 milliGRAM(s) Oral before breakfast  polyethylene glycol 3350 17 Gram(s) Oral daily  predniSONE   Tablet 20 milliGRAM(s) Oral daily  senna 2 Tablet(s) Oral at bedtime  simethicone 80 milliGRAM(s) Chew once  theophylline ER Capsule 400 milliGRAM(s) Oral daily      TELEMETRY: 	    ECG:  	  RADIOLOGY:   DIAGNOSTIC TESTING:  [ ] Echocardiogram:  [ ]  Catheterization:  [ ] Stress Test:    OTHER: 	    LABS:	 	                                10.0   5.3   )-----------( 235      ( 01 Jan 2019 09:28 )             30.6     01-01    139  |  89<L>  |  30<H>  ----------------------------<  258<H>  4.3   |  39<H>  |  0.96    Ca    9.7      01 Jan 2019 10:35

## 2019-01-02 NOTE — PROVIDER CONTACT NOTE (OTHER) - ACTION/TREATMENT ORDERED:
NP Christianne Aquino aware that pt fingerstick glucose of 68, repeat 77, pt given one glass of juice, will continue to monitor pt and fingerstick glucose level NP Christianne Aquino aware that pt fingerstick glucose of 68, repeat 77, pt given one glass of juice, will continue to monitor pt and fingerstick glucose level, repeat of 81, pt eating , will continue to m

## 2019-01-03 DIAGNOSIS — R06.02 SHORTNESS OF BREATH: ICD-10-CM

## 2019-01-03 LAB
ALBUMIN SERPL ELPH-MCNC: 3.6 G/DL — SIGNIFICANT CHANGE UP (ref 3.3–5)
ALP SERPL-CCNC: 53 U/L — SIGNIFICANT CHANGE UP (ref 40–120)
ALT FLD-CCNC: 43 U/L — SIGNIFICANT CHANGE UP (ref 10–45)
ANION GAP SERPL CALC-SCNC: 10 MMOL/L — SIGNIFICANT CHANGE UP (ref 5–17)
AST SERPL-CCNC: 28 U/L — SIGNIFICANT CHANGE UP (ref 10–40)
BASE EXCESS BLDA CALC-SCNC: 15.9 MMOL/L — HIGH (ref -2–2)
BILIRUB SERPL-MCNC: 0.4 MG/DL — SIGNIFICANT CHANGE UP (ref 0.2–1.2)
BUN SERPL-MCNC: 20 MG/DL — SIGNIFICANT CHANGE UP (ref 7–23)
CALCIUM SERPL-MCNC: 9.5 MG/DL — SIGNIFICANT CHANGE UP (ref 8.4–10.5)
CHLORIDE SERPL-SCNC: 86 MMOL/L — LOW (ref 96–108)
CO2 BLDA-SCNC: 45 MMOL/L — HIGH (ref 22–30)
CO2 SERPL-SCNC: 39 MMOL/L — HIGH (ref 22–31)
CREAT SERPL-MCNC: 0.99 MG/DL — SIGNIFICANT CHANGE UP (ref 0.5–1.3)
GLUCOSE BLDC GLUCOMTR-MCNC: 114 MG/DL — HIGH (ref 70–99)
GLUCOSE BLDC GLUCOMTR-MCNC: 211 MG/DL — HIGH (ref 70–99)
GLUCOSE BLDC GLUCOMTR-MCNC: 213 MG/DL — HIGH (ref 70–99)
GLUCOSE BLDC GLUCOMTR-MCNC: 89 MG/DL — SIGNIFICANT CHANGE UP (ref 70–99)
GLUCOSE SERPL-MCNC: 152 MG/DL — HIGH (ref 70–99)
HCO3 BLDA-SCNC: 43 MMOL/L — HIGH (ref 21–29)
HCT VFR BLD CALC: 31.2 % — LOW (ref 34.5–45)
HGB BLD-MCNC: 10.2 G/DL — LOW (ref 11.5–15.5)
HOROWITZ INDEX BLDA+IHG-RTO: 36 — SIGNIFICANT CHANGE UP
MAGNESIUM SERPL-MCNC: 2.1 MG/DL — SIGNIFICANT CHANGE UP (ref 1.6–2.6)
MCHC RBC-ENTMCNC: 28.6 PG — SIGNIFICANT CHANGE UP (ref 27–34)
MCHC RBC-ENTMCNC: 32.7 GM/DL — SIGNIFICANT CHANGE UP (ref 32–36)
MCV RBC AUTO: 87.4 FL — SIGNIFICANT CHANGE UP (ref 80–100)
PCO2 BLDA: 65 MMHG — HIGH (ref 32–46)
PH BLDA: 7.44 — SIGNIFICANT CHANGE UP (ref 7.35–7.45)
PHOSPHATE SERPL-MCNC: 3.7 MG/DL — SIGNIFICANT CHANGE UP (ref 2.5–4.5)
PLATELET # BLD AUTO: 216 K/UL — SIGNIFICANT CHANGE UP (ref 150–400)
PO2 BLDA: 152 MMHG — HIGH (ref 74–108)
POTASSIUM SERPL-MCNC: 3.9 MMOL/L — SIGNIFICANT CHANGE UP (ref 3.5–5.3)
POTASSIUM SERPL-SCNC: 3.9 MMOL/L — SIGNIFICANT CHANGE UP (ref 3.5–5.3)
PROT SERPL-MCNC: 6 G/DL — SIGNIFICANT CHANGE UP (ref 6–8.3)
RBC # BLD: 3.57 M/UL — LOW (ref 3.8–5.2)
RBC # FLD: 14.8 % — HIGH (ref 10.3–14.5)
SAO2 % BLDA: 97 % — HIGH (ref 92–96)
SODIUM SERPL-SCNC: 135 MMOL/L — SIGNIFICANT CHANGE UP (ref 135–145)
WBC # BLD: 4.35 K/UL — SIGNIFICANT CHANGE UP (ref 3.8–10.5)
WBC # FLD AUTO: 4.35 K/UL — SIGNIFICANT CHANGE UP (ref 3.8–10.5)

## 2019-01-03 PROCEDURE — 99232 SBSQ HOSP IP/OBS MODERATE 35: CPT

## 2019-01-03 PROCEDURE — 99233 SBSQ HOSP IP/OBS HIGH 50: CPT

## 2019-01-03 PROCEDURE — 99254 IP/OBS CNSLTJ NEW/EST MOD 60: CPT | Mod: 25

## 2019-01-03 PROCEDURE — 31575 DIAGNOSTIC LARYNGOSCOPY: CPT

## 2019-01-03 RX ORDER — INSULIN LISPRO 100/ML
4 VIAL (ML) SUBCUTANEOUS
Qty: 0 | Refills: 0 | Status: DISCONTINUED | OUTPATIENT
Start: 2019-01-03 | End: 2019-01-14

## 2019-01-03 RX ORDER — INSULIN GLARGINE 100 [IU]/ML
8 INJECTION, SOLUTION SUBCUTANEOUS AT BEDTIME
Qty: 0 | Refills: 0 | Status: DISCONTINUED | OUTPATIENT
Start: 2019-01-03 | End: 2019-01-06

## 2019-01-03 RX ORDER — INSULIN LISPRO 100/ML
4 VIAL (ML) SUBCUTANEOUS
Qty: 0 | Refills: 0 | Status: DISCONTINUED | OUTPATIENT
Start: 2019-01-03 | End: 2019-01-15

## 2019-01-03 RX ORDER — INSULIN LISPRO 100/ML
3 VIAL (ML) SUBCUTANEOUS
Qty: 0 | Refills: 0 | Status: DISCONTINUED | OUTPATIENT
Start: 2019-01-03 | End: 2019-01-15

## 2019-01-03 RX ADMIN — Medication 3 UNIT(S): at 18:16

## 2019-01-03 RX ADMIN — Medication 3 MILLILITER(S): at 18:15

## 2019-01-03 RX ADMIN — ONDANSETRON 4 MILLIGRAM(S): 8 TABLET, FILM COATED ORAL at 16:32

## 2019-01-03 RX ADMIN — Medication 2000 UNIT(S): at 13:15

## 2019-01-03 RX ADMIN — Medication 1 TABLET(S): at 13:16

## 2019-01-03 RX ADMIN — Medication 2: at 18:16

## 2019-01-03 RX ADMIN — Medication 100 MILLIGRAM(S): at 06:01

## 2019-01-03 RX ADMIN — Medication 81 MILLIGRAM(S): at 13:16

## 2019-01-03 RX ADMIN — Medication 3 MILLILITER(S): at 22:25

## 2019-01-03 RX ADMIN — Medication 3 MILLILITER(S): at 14:16

## 2019-01-03 RX ADMIN — Medication 1 DROP(S): at 23:22

## 2019-01-03 RX ADMIN — Medication 1 MILLIGRAM(S): at 22:26

## 2019-01-03 RX ADMIN — Medication 1 DROP(S): at 13:15

## 2019-01-03 RX ADMIN — Medication 4 UNIT(S): at 13:16

## 2019-01-03 RX ADMIN — Medication 4 UNIT(S): at 09:05

## 2019-01-03 RX ADMIN — Medication 5 MILLILITER(S): at 18:16

## 2019-01-03 RX ADMIN — ONDANSETRON 4 MILLIGRAM(S): 8 TABLET, FILM COATED ORAL at 02:37

## 2019-01-03 RX ADMIN — Medication 3 MILLILITER(S): at 06:01

## 2019-01-03 RX ADMIN — Medication 20 MILLIGRAM(S): at 06:01

## 2019-01-03 RX ADMIN — Medication 500000 UNIT(S): at 13:16

## 2019-01-03 RX ADMIN — AZITHROMYCIN 250 MILLIGRAM(S): 500 TABLET, FILM COATED ORAL at 09:05

## 2019-01-03 RX ADMIN — Medication 40 MILLIGRAM(S): at 06:01

## 2019-01-03 RX ADMIN — INSULIN GLARGINE 8 UNIT(S): 100 INJECTION, SOLUTION SUBCUTANEOUS at 22:27

## 2019-01-03 RX ADMIN — Medication 500000 UNIT(S): at 18:16

## 2019-01-03 RX ADMIN — Medication 3 MILLILITER(S): at 10:59

## 2019-01-03 RX ADMIN — MONTELUKAST 10 MILLIGRAM(S): 4 TABLET, CHEWABLE ORAL at 13:16

## 2019-01-03 RX ADMIN — Medication 2: at 09:06

## 2019-01-03 RX ADMIN — Medication 0.5 MILLIGRAM(S): at 18:16

## 2019-01-03 RX ADMIN — Medication 0.5 MILLIGRAM(S): at 06:01

## 2019-01-03 RX ADMIN — PANTOPRAZOLE SODIUM 40 MILLIGRAM(S): 20 TABLET, DELAYED RELEASE ORAL at 06:01

## 2019-01-03 RX ADMIN — Medication 400 MILLIGRAM(S): at 09:05

## 2019-01-03 RX ADMIN — ISOSORBIDE MONONITRATE 30 MILLIGRAM(S): 60 TABLET, EXTENDED RELEASE ORAL at 13:17

## 2019-01-03 RX ADMIN — Medication 500000 UNIT(S): at 06:02

## 2019-01-03 RX ADMIN — SENNA PLUS 2 TABLET(S): 8.6 TABLET ORAL at 22:26

## 2019-01-03 RX ADMIN — Medication 100 MILLIGRAM(S): at 22:27

## 2019-01-03 RX ADMIN — ENOXAPARIN SODIUM 40 MILLIGRAM(S): 100 INJECTION SUBCUTANEOUS at 22:25

## 2019-01-03 RX ADMIN — Medication 1 DROP(S): at 06:01

## 2019-01-03 RX ADMIN — Medication 100 MILLIGRAM(S): at 13:16

## 2019-01-03 NOTE — PROGRESS NOTE ADULT - PROBLEM SELECTOR PLAN 1
-test BG AC/HS  -Increase Lantus 8 units QHS  -c/w Humalog 4-4-3 w/meals (ensure pt is eating prior to giving dose)  -c/w Humalog low correction scale AC and Low HS scale  Please notify endocrine when steroids further tapered so we can adjust the does of insulin!!!  discharge plan: restart home Metformin if off steroids  -Plan discussed with pt/team. If discharge to rehab on same steroid doses will continue basal/bolus therapy. Doses TBD  [phone].

## 2019-01-03 NOTE — PROGRESS NOTE ADULT - SUBJECTIVE AND OBJECTIVE BOX
[facility] - [facility]     PROGRESS NOTE    CHIEF COMPLAINT: chronic hypoxic/hypercapnic respiratory failure; COPD exacerbation; emphysema; dyspnea    INTERVAL HISTORY: in chair - depressed, frustrated and anxious; looks comfortable on a nasal canula but feels better on BIPAP on and off during the day and night; ABG with worsening (but well compensated) respiratory acidosis; able to stand up without difficulty but has severe leg swelling and discomfort; less arm swelling on diuretics and reduced dose of steroids; resolved AURORA and hyponatremia; no cough, sputum production, chest congestion or wheeze; no fevers, chills or sweats; no chest pain/pressure or palpitations;    REVIEW OF SYSTEMS:  Constitutional: As per interval history  HEENT: Within normal limits  CV: As per interval history  Resp: As per interval history  GI: Within normal limits   : Within normal limits  Musculoskeletal: improving lower extremity weakness   Skin: Within normal limits  Neurological: Within normal limits  Psychiatric: frustrated and anxious  Endocrine: Within normal limits  Hematologic/Lymphatic: Within normal limits  Allergic/Immunologic: Within normal limits    MEDICATIONS:     Pulmonary "  ALBUTerol/ipratropium for Nebulization 3 milliLiter(s) Nebulizer <User Schedule>  buDESOnide   0.5 milliGRAM(s) Respule 0.5 milliGRAM(s) Inhalation every 12 hours  montelukast 10 milliGRAM(s) Oral daily  theophylline ER Capsule 400 milliGRAM(s) Oral daily      Anti-microbials:  azithromycin   Tablet 250 milliGRAM(s) Oral <User Schedule>  nystatin    Suspension 427619 Unit(s) Oral four times a day      Cardiovascular:  furosemide    Tablet 40 milliGRAM(s) Oral daily  isosorbide   mononitrate ER Tablet (IMDUR) 30 milliGRAM(s) Oral daily      Other:  aluminum hydroxide/magnesium hydroxide/simethicone Suspension 30 milliLiter(s) Oral every 6 hours PRN  artificial tears (preservative free) Ophthalmic Solution 1 Drop(s) Both EYES three times a day  aspirin enteric coated 81 milliGRAM(s) Oral daily  Biotene Dry Mouth Oral Rinse 5 milliLiter(s) Swish and Spit two times a day  bisacodyl Suppository 10 milliGRAM(s) Rectal daily PRN  cholecalciferol 2000 Unit(s) Oral daily  dextrose 40% Gel 15 Gram(s) Oral once PRN  dextrose 5%. 1000 milliLiter(s) IV Continuous <Continuous>  dextrose 50% Injectable 12.5 Gram(s) IV Push once  dextrose 50% Injectable 25 Gram(s) IV Push once  dextrose 50% Injectable 25 Gram(s) IV Push once  docusate sodium 100 milliGRAM(s) Oral three times a day  enoxaparin Injectable 40 milliGRAM(s) SubCutaneous every 24 hours  glucagon  Injectable 1 milliGRAM(s) IntraMuscular once PRN  insulin glargine Injectable (LANTUS) 8 Unit(s) SubCutaneous at bedtime  insulin lispro (HumaLOG) corrective regimen sliding scale   SubCutaneous three times a day before meals  insulin lispro (HumaLOG) corrective regimen sliding scale   SubCutaneous at bedtime  insulin lispro Injectable (HumaLOG) 4 Unit(s) SubCutaneous before breakfast  insulin lispro Injectable (HumaLOG) 3 Unit(s) SubCutaneous with dinner  insulin lispro Injectable (HumaLOG) 4 Unit(s) SubCutaneous before lunch  melatonin 1 milliGRAM(s) Oral at bedtime  multivitamin 1 Tablet(s) Oral daily  ondansetron Injectable 4 milliGRAM(s) IV Push every 6 hours PRN  pantoprazole    Tablet 40 milliGRAM(s) Oral before breakfast  polyethylene glycol 3350 17 Gram(s) Oral daily  predniSONE   Tablet 20 milliGRAM(s) Oral daily  senna 2 Tablet(s) Oral at bedtime  simethicone 80 milliGRAM(s) Chew once  sodium chloride 0.65% Nasal 1 Spray(s) Both Nostrils two times a day PRN    OBJECTIVE:    I&O's Detail    02 Jan 2019 07:01  -  03 Jan 2019 07:00  --------------------------------------------------------  IN:    Oral Fluid: 1200 mL  Total IN: 1200 mL    OUT:    Voided: 1600 mL  Total OUT: 1600 mL    Total NET: -400 mL      03 Jan 2019 07:01  -  03 Jan 2019 16:20  --------------------------------------------------------  IN:  Total IN: 0 mL    OUT:    Voided: 300 mL  Total OUT: 300 mL    Total NET: -300 mL    POCT Blood Glucose.: 114 mg/dL (03 Jan 2019 12:31)  POCT Blood Glucose.: 213 mg/dL (03 Jan 2019 08:45)  POCT Blood Glucose.: 117 mg/dL (02 Jan 2019 22:03)  POCT Blood Glucose.: 81 mg/dL (02 Jan 2019 18:49)  POCT Blood Glucose.: 72 mg/dL (02 Jan 2019 18:16)  POCT Blood Glucose.: 77 mg/dL (02 Jan 2019 17:23)  POCT Blood Glucose.: 68 mg/dL (02 Jan 2019 17:21)      PHYSICAL EXAM:       ICU Vital Signs Last 24 Hrs  T(C): 36.9 (03 Jan 2019 12:32), Max: 37.3 (02 Jan 2019 22:05)  T(F): 98.4 (03 Jan 2019 12:32), Max: 99.1 (02 Jan 2019 22:05)  HR: 97 (03 Jan 2019 12:32) (93 - 100)  BP: 127/82 (03 Jan 2019 12:32) (127/82 - 154/88)  BP(mean): --  ABP: --  ABP(mean): --  RR: 18 (03 Jan 2019 12:32) (18 - 19)  SpO2: 99% (03 Jan 2019 12:32) (99% - 100%) on 4lpm nasal canula     General: Awake. Alert. Cooperative. No distress. Appears stated age. Obese. Cushingoid.  HEENT:  Atraumatic. Moonlike facies. Anicteric. Normal oral mucosa. PERRL. EOMI.   Neck: Supple. Trachea midline. Thyroid without enlargement/tenderness/nodules. No carotid bruit. No JVD.	  Cardiovascular: Regular rate and rhythm. Distant S1 S2. No murmurs, rubs or gallops.  Respiratory: Respirations unlabored with pursed lip breathing. Markedly decreased breath sounds throughout. No wheeze. No curvature.  Abdomen: Soft. Non-tender. Non-distended. No organomegaly. No masses. Normal bowel sounds. Obese.  Extremities: Warm to touch. No clubbing or cyanosis. Moderate bilateral lower extremity edema up to the thigh. Decreased bilateral upper extremity swelling.  Pulses: Decreased lower extremity peripheral pulses.  Skin: No rashes or lesions. No ecchymoses. No cyanosis. Warm to touch.   Lymph Nodes: Cervical, supraclavicular and axillary nodes normal  Neurological: Motor and sensory examination equal and normal. A and O x 3  Psychiatry: Appropriate mood and affect.      LABS:                        10.2   4.35  )-----------( 216      ( 03 Jan 2019 08:52 )             31.2     01-03    135  |  86<L>  |  20  ----------------------------<  152<H>  3.9   |  39<H>  |  0.99    01-01    139  |  89<L>  |  30<H>  ----------------------------<  258<H>  4.3   |  39<H>  |  0.96    Ca      9.5      01-03    Ca      9.7      01-01    Phos    3.7     01-03      Mg       2.1     01-03    TPro  6.0  /  Alb  3.6  /  TBili  0.4  /  DBili  x   /  AST  28  /  ALT  43  /  AlkPhos  53  01-03    ABG - ( 03 Jan 2019 06:40 )  pH: 7.44  /  pCO2: 65    /  pO2: 152   / HCO3: 43    / Base Excess: 15.9  /  SaO2: 97        ABG - ( 01 Jan 2019 03:44 )  pH: 7.42  /  pCO2: 69    /  pO2: 165   / HCO3: 44    / Base Excess: 17.3  /  SaO2: 97        ABG - ( 31 Dec 2018 16:19 )  pH: 7.38  /  pCO2: 84    /  pO2: 26    / HCO3: 48    / Base Excess: 20.2  /  SaO2: 41        ABG - ( 18 Dec 2018 13:23 )  pH: 7.46  /  pCO2: 47    /  pO2: 88    / HCO3: 33    / Base Excess: 8.0   /  SaO2: 97        ABG - ( 16 Dec 2018 20:53 )  pH: 7.44  /  pCO2: 53    /  pO2: 173   / HCO3: 36    / Base Excess: 10.0  /  SaO2: 100       ABG - ( 13 Dec 2018 06:29 )  pH: 7.30  /  pCO2: 71    /  pO2: 182   / HCO3: 34    / Base Excess: 5.8   /  SaO2: 99        ABG - ( 13 Dec 2018 00:59 )  pH: 7.32  /  pCO2: 71    /  pO2: 75    / HCO3: 35    / Base Excess: 7.1   /  SaO2: 95        ABG - ( 11 Dec 2018 11:45 )  pH: 7.39  /  pCO2: 54    /  pO2: 98    / HCO3: 32    / Base Excess: 6.2   /  SaO2: 98        ABG - ( 09 Dec 2018 11:26 )  pH: 7.35  /  pCO2: 63    /  pO2: 145   / HCO3: 34    / Base Excess: 6.6   /  SaO2: 99      ABG - ( 09 Dec 2018 00:28 )  pH: 7.32  /  pCO2: 71    /  pO2: 124   / HCO3: 36    / Base Excess: 7.6   /  SaO2: 99        ABG - ( 08 Dec 2018 20:50 )  pH: 7.32  /  pCO2: 72    /  pO2: 80    / HCO3: 36    / Base Excess: 7.7   /  SaO2: 95        Serum Pro-Brain Natriuretic Peptide: 254 pg/mL (12-13 @ 14:00)    < from: Transthoracic Echocardiogram (12.07.18 @ 08:59) >    Patient name: GUY HARTMAN  YOB: 1960   Age: 58 (F)   MR#: [id number]  Study Date: 12/7/2018  Location: [address] [place], [place] [id number]: [facility]  Study quality: Technically good  Referring Physician: Allen ingram MD  BloodPressure: 120/80 mmHg  Height: 163 cm  Weight: 88 kg  BSA: 1.9 m2  ------------------------------------------------------------------------  PROCEDURE: Transthoracic echocardiogram with 2-D, M-Mode  and complete spectral and color flow Doppler.  INDICATION: Other forms of dyspnea (R06.09)  ------------------------------------------------------------------------  Dimensions:    Normal Values:  LA:     3.6    2.0 - 4.0 cm  Ao:     2.9    2.0 - 3.8 cm  SEPTUM: 0.9    0.6 - 1.2 cm  PWT:    0.8    0.6- 1.1 cm  LVIDd:  4.9    3.0 - 5.6 cm  LVIDs:  2.9    1.8 - 4.0 cm  Derived variables:  LVMI: 73 g/m2  RWT: 0.32  Fractional short: 40 %  EF (Teicholtz): 71 %  Doppler Peak Velocity (m/sec): AoV=1.2  ------------------------------------------------------------------------  Observations:  Mitral Valve: Normal mitral valve.  Aortic Valve/Aorta: Normal trileaflet aortic valve. Peak  transaortic valve gradient equals 6 mm Hg, mean transaortic  valve gradient equals 3 mm Hg, aortic valve velocity time  integral equals 22 cm. Trace aortic regurgitation.  Aortic Root: 2.9 cm.  Left Atrium: Normal left atrium.  LA volume index = 18  cc/m2.  Left Ventricle: Normal left ventricular systolic function.  No segmental wall motion abnormalities. Normal left  ventricular internal dimensions and wall thicknesses. Mild  diastolic dysfunction (Stage I).  Right Heart: Normal right atrium. Normal right ventricular  size and function. Normal tricuspid valve. Minimal  tricuspid regurgitation. Normal pulmonic valve.  Pericardium/Pleura: Normal pericardium with no pericardial  effusion.  Hemodynamic: Estimated right atrial pressure is 8 mm Hg.  Inadequate tricuspid regurgitation Doppler envelope  precludes estimation of RVSP.  ------------------------------------------------------------------------  Conclusions:  1. Normal mitral valve.  2. Normal trileaflet aortic valve. Peak transaortic valve  gradient equals 6 mm Hg, mean transaortic valve gradient  equals 3 mm Hg, aortic valve velocity time integral equals  22 cm. Trace aortic regurgitation.  3. Aortic Root: 2.9 cm.  4. Normal left atrium.  LA volume index = 18 cc/m2.  5. Normal left ventricular internal dimensions and wall  thicknesses.  6. Normal left ventricular systolic function. No segmental  wall motion abnormalities.  7. Mild diastolic dysfunction (Stage I).  8. Normal right ventricular size and function.  9. Inadequate tricuspid regurgitation Doppler envelope  precludes estimation of RVSP.  10. Normal tricuspid valve. Minimal tricuspid  regurgitation.  *** Compared with echocardiogram report of 2/18/2014, no  significant changes noted.  ------------------------------------------------------------------------  Confirmed on  12/7/2018 - 13:49:43 by Shefali Gómez M.D.  ------------------------------------------------------------------------    < end of copied text >  ---------------------------------------------------------------------------------------------------------------    MICROBIOLOGY:     Culture - Sputum . (12.07.18 @ 08:34)    Gram Stain:   Rare polymorphonuclear leukocytes per low power field  Rare Squamous epithelial cells per low power field  Numerous Gram Positive Cocci in Pairs and Chains per oil power field    Specimen Source: .Sputum Sputum    Culture Results:   Normal Respiratory Samaria present    Culture - Sputum . (12.05.18 @ 22:50)    Gram Stain:   Moderate polymorphonuclear leukocytes per low power field  Few Squamous epithelial cells per low power field  Moderate Gram Positive Cocci in Pairs and Chains per oil power field    Specimen Source: .Sputum Sputum    Culture Results:   Normal Respiratory Samaria present    Rapid Respiratory Viral Panel (11.30.18 @ 01:30)    Rapid RVP Result: NotDete: The Film[employer] RVP Rapid uses polymerase chain reaction (PCR) and melt  curve analysis to screen for adenovirus; coronavirus HKU1, NL63, 229E,  OC43; human metapneumovirus (hMPV); human enterovirus/rhinovirus  (Entero/RV); influenza A; influenza A/H1;influenza A/H3; influenza  A/H1-2009; influenza B; parainfluenza viruses 1, 2, 3, 4; respiratory  syncytial virus; Bordetella pertussis; Mycoplasma pneumoniae; and  Chlamydophila pneumoniae.    RADIOLOGY:  [x] Chest radiographs reviewed and interpreted by me    < from: [facility] Duplex Lower Ext Vein Scan, Bilat (12.30.18 @ 19:06) >    EXAM:  DUPLEX SCAN EXT VEINS LOWER BI                          PROCEDURE DATE:  12/30/2018      INTERPRETATION:  Clinical indication: Worsening edema.    Technique:  Grayscale, color Doppler and spectral Doppler ultrasound was   utilized toevaluate bilateral lower extremity deep venous system.      Comparison: 12/6/2018.    Findings: There is no thrombosis in bilateral common femoral veins,   femoral veins or popliteal veins. Visualized calf veins are patent. There   is bilateral lowerextremity soft tissue edema.    Impression:     No evidence of deep vein thrombosis in either lower extremity.    BROCK TOBIN M.D., ATTENDING RADIOLOGIST  This document has been electronically signed. Dec 30 2018  7:24PM     < end of copied text >  ---------------------------------------------------------------------------------------------------------------  < from: [facility] Duplex Upper Ext Vein Scan, Bilat (12.30.18 @ 19:05) >    EXAM:  DUPLEX SCAN EXT VEINS UPPER BI                          PROCEDURE DATE:  12/30/2018      INTERPRETATION:  Clinical information: Worsening bilateral upper   extremity edema.    Findings: Duplex evaluation of the deep venous system of the right and   left upper extremity was performed to include the brachiocephalic,   internal jugular, subclavian, axillary, brachial, radial and ulnar veins.   No echogenic thrombus is seen within the vein lumina. Complete coaptation   of those segments of vein accessible to compression was achieved.   Spontaneous venous flow with respiratory and cardiac pulsatility is noted   throughout.     The right innominate vein is patent. The left innominate vein was not   visualized.     The right and left basilic and cephalic veins are patent and compressible.    Impression: No duplex evidence of DVT in the right and left upper   extremity.    RAYMUNDO DUMONT M.D., ATTENDING RADIOLOGIST  This document has been electronically signed. Dec 31 2018  8:49AM     < end of copied text >  ---------------------------------------------------------------------------------------------------------------  < from: Xray Chest 1 View AP/PA (12.25.18 @ 18:04) >    EXAM:  XR CHEST AP OR PA 1V                          PROCEDURE DATE:  12/25/2018      INTERPRETATION:  CLINICAL INFORMATION: Shortness of breath. History of   COPD.    TECHNIQUE: AP view of the chest 12/25/2018.    COMPARISON: Chest radiograph 12/13/2018.     FINDINGS:     The lungs are clear. There is no pneumothorax and no pleural effusions.   Cardiac size is not accurately evaluated in this projection.  The visualized osseous structures demonstrate no acute abnormality.    IMPRESSION:   Clear lungs.    SUSANA HODGES M.D., RADIOLOGY RESIDENT  This document has been electronically signed.  SVETLANA SANDOVAL M.D., ATTENDING RADIOLOGIST  This document has been electronically signed. Dec 26 2018 10:59AM      < end of copied text >  ---------------------------------------------------------------------------------------------------------------  < from: CT Angio Chest w/ IV Cont (12.04.18 @ 16:30) >    EXAM:  CT ANGIO CHEST (W)AW IC                          PROCEDURE DATE:  12/04/2018      INTERPRETATION:  CT CHEST WITH CONTRAST    INDICATION: Known COPD not responding to steroids and bronchodilators.   Progressively worsening dyspnea. Evaluate for pulmonary embolism.    TECHNIQUE: Enhanced helical images were obtained of the chest. Coronal   and sagittal images were reconstructed. Maximum intensity projection   images were generated. Images were obtained after the uneventful   administration of 60 cc nonionic intravenous Omnipaque 350.  40 cc of   Omnipaque 350 was discarded.    COMPARISON: CT chest 2/18/2014.    FINDINGS:     Lungs And Airways: Emphysematous changes of the lung.      The airways are unremarkable.      Pleura: No pneumothorax. No pleural effusion.    Mediastinum: There are no enlarged chest lymph nodes. The visualized   portion of the thyroid gland is unremarkable.       Heart and Vasculature: No pulmonary embolism.    The main pulmonary artery is normal in caliber. No thoracic aortic   aneurysm or dissection. Atheromatous disease of the aorta.    The heart is normal in size.  There is no pericardial effusion.   Coronary artery disease with calcified plaque involving the right and   left main coronary arteries and the left anterior descending artery.      Upper Abdomen: The upper abdomen is unremarkable.    Bones And Soft Tissues: Degenerative changes of the spine.  The soft   tissues are unremarkable.      IMPRESSION:   1.  No pulmonary embolism.  2. Emphysematous changes of the lung.    DIANA MEDINA M.D., RADIOLOGY RESIDENT  This document has been electronically signed.  JEYSON SANCHEZ M.D., ATTENDING RADIOLOGIST  This document has been electronically signed. Dec  4 2018  5:21PM      < end of copied text >  ---------------------------------------------------------------------------------------------------------------  SPIROMETRY:     FEV1 0.56 liters - 22% predicted  FVC 1.73 liters - 52% predicted  FEV1% 32    c/w very severe obstructive lung disease

## 2019-01-03 NOTE — PROGRESS NOTE ADULT - PROBLEM SELECTOR PLAN 3
A1C 7.2  -c/w Lantus 7 units QHS  -c/w Humalog  4-4-3 w/meals   - Continue to monitor blood sugars.  - Endocrine following

## 2019-01-03 NOTE — PROGRESS NOTE ADULT - PROBLEM SELECTOR PLAN 1
c/w Prednisone 20mg po qd   Continue Pulmicort, Duoneb ATC   c/w Theophylline, Singulair.  Completed 7 days of biaxin   Home Trilogy vent arranged by pulmonary.  c/w intermittent bipap ; alternating with 5L NC  C/w daliresp  cw azithromycin   Plan for discharge to acute pulmonary rehab  Appreciate pm&r recs  ENT evaluated, pt s/p laryngoscope wnl as part of preopr workup prior to transplant   Pulm coordinating lung transplant at [place]

## 2019-01-03 NOTE — PROGRESS NOTE ADULT - ASSESSMENT
ASSESSMENT:    ongoing dyspnea with minimal exertion with chronic hypoxic and hypercapnic respiratory failure due to very severe COPD with "exacerbation" - adequate oxygenation on a nasal canula in the setting of profound dyspnea suggests that alterations in the mechanics of breathing due to lung hyperinflation and obesity are likely playing a significant role in her shortness of breath - anxiety is also playing a role - there is evidence of CAD without pulmonary hypertension on the CT scan which is without pulmonary emboli or pneumonia - cardiac catheterization last year revealed patent stents - ECHO has a preserved LVEF, no significant valvular heart disease and no pulmonary hypertension - resolved AURORA, hyperkalemia and hyponatremia - resolved respiratory acidosis on repeat ABG although the pCO2 level has decreased from the 80s back to the 60s - lower extremity weakness likely due to steroid induced myopathy perhaps exacerbated by statin use seems to be improving    extremity edema/discomfort likely related to steroid induced fluid retention - no evidence of DVT on repeat upper or lower extremity Duplex examination - improved upper extremity swelling but increasing lower extremity edema is making ambulation more difficulty    HTN/HLD/DM    CAD s/p PCI    PAD    PLAN/RECOMMENDATIONS:    BIPAP 12/5 with 40% FiO2 for sleep - on and off during the day for increased work of breathing or recurrent respiratory acidosis - we will need to wean her off daytime BIPAP for transfer to an acute rehabilitation center  oxygen supplementation to keep saturation greater than 92% using a nasal canula when off BIPAP  follow ABG  sputum culture - no growth x2 - has completed a course of biaxin  home trilogy vent has been arranged with [facility]   albuterol/atrovent nebs q4h holding 2AM dose  pulmicort 0.5mg nebs q12h  singulair/theophylline - theophylline level is subtherapeutic - daliresp discontinued  prednisone 15mg daily - glucose control - nystatin - continue steroid taper given lack of improvement with systemic steroids and likely steroid related side effects  azithromycin TIW for antiinflammatory effects  cardiology evaluation noted  cardiac meds: ASA/imdur/lasix - off crestor with possible myopathy - off norvasc due to swelling - BP control adequate  diurese as tolerated by renal function and hemodynamics - watch for worsening metabolic alkalosis with loop diuretic use which could lead to worsening hypercapnia  GI/DVT prophylaxis - protoix/SQ lovenox  physical therapy daily  TOMAS stockings  holistic nurse evaluation  transfer to acute rehab when able  outpatient evaluation for lung transplantation    Will follow with you. Plan of care discussed with the patient and her family at bedside and the dedicated floor NP and . Hopeful discharge to acute rehab on Monday.     David Fair MD, [facility] - [phone]  Pulmonary Medicine

## 2019-01-03 NOTE — PROGRESS NOTE ADULT - SUBJECTIVE AND OBJECTIVE BOX
Diabetes Follow up note:  Interval Hx:  59 y/o F w/h/o controlled T2DM on Metformin 500mg bid. Also h/o CAD and COPD. Here with COPD exacerbation> now on Prednisone 20mg daily.       Review of Systems:  General:  GI: Tolerating POs without any N/V/D/ABD PAIN.  CV: No CP/SOB  ENDO: No S&Sx of hypoglycemia  MEDS:    insulin glargine Injectable (LANTUS) 7 Unit(s) SubCutaneous at bedtime  insulin lispro (HumaLOG) corrective regimen sliding scale   SubCutaneous three times a day before meals  insulin lispro (HumaLOG) corrective regimen sliding scale   SubCutaneous at bedtime  insulin lispro Injectable (HumaLOG) 4 Unit(s) SubCutaneous before lunch  insulin lispro Injectable (HumaLOG) 4 Unit(s) SubCutaneous before breakfast  insulin lispro Injectable (HumaLOG) 3 Unit(s) SubCutaneous with dinner  predniSONE   Tablet 20 milliGRAM(s) Oral daily    azithromycin   Tablet 250 milliGRAM(s) Oral <User Schedule>  nystatin    Suspension 649829 Unit(s) Oral four times a day    Allergies    No Known Allergies    PE:  General: Female  Vital Signs Last 24 Hrs  T(C): 36.7 (03 Jan 2019 03:46), Max: 37.3 (02 Jan 2019 22:05)  T(F): 98 (03 Jan 2019 03:46), Max: 99.1 (02 Jan 2019 22:05)  HR: 96 (03 Jan 2019 07:57) (88 - 100)  BP: 154/88 (03 Jan 2019 03:46) (151/75 - 154/88)  BP(mean): --  RR: 18 (03 Jan 2019 03:46) (18 - 20)  SpO2: 100% (03 Jan 2019 07:57) (98% - 100%)  Abd: Soft, NT,ND,   Extremities: Warm  Neuro: A&O X3    LABS:    POCT Blood Glucose.: 213 mg/dL (01-03-19 @ 08:45)  POCT Blood Glucose.: 117 mg/dL (01-02-19 @ 22:03)  POCT Blood Glucose.: 81 mg/dL (01-02-19 @ 18:49)  POCT Blood Glucose.: 72 mg/dL (01-02-19 @ 18:16)  POCT Blood Glucose.: 77 mg/dL (01-02-19 @ 17:23)  POCT Blood Glucose.: 68 mg/dL (01-02-19 @ 17:21)  POCT Blood Glucose.: 139 mg/dL (01-02-19 @ 12:52)  POCT Blood Glucose.: 241 mg/dL (01-02-19 @ 08:56)  POCT Blood Glucose.: 153 mg/dL (01-02-19 @ 01:07)  POCT Blood Glucose.: 124 mg/dL (01-01-19 @ 21:13)  POCT Blood Glucose.: 137 mg/dL (01-01-19 @ 17:30)  POCT Blood Glucose.: 264 mg/dL (01-01-19 @ 12:55)  POCT Blood Glucose.: 227 mg/dL (12-31-18 @ 22:04)  POCT Blood Glucose.: 183 mg/dL (12-31-18 @ 17:34)  POCT Blood Glucose.: 255 mg/dL (12-31-18 @ 13:03)                            10.2   4.35  )-----------( 216      ( 03 Jan 2019 08:52 )             31.2       01-03    135  |  86<L>  |  20  ----------------------------<  152<H>  3.9   |  39<H>  |  0.99    Ca    9.5      03 Jan 2019 06:44  Phos  3.7     01-03  Mg     2.1     01-03    TPro  6.0  /  Alb  3.6  /  TBili  0.4  /  DBili  x   /  AST  28  /  ALT  43  /  AlkPhos  53  01-03      Hemoglobin A1C, Whole Blood: 7.2 % <H> [4.0 - 5.6] (11-30-18 @ 07:58)            Contact number: kateryna [phone] or [phone] Diabetes Follow up note:  Interval Hx:  57 y/o F w/h/o controlled T2DM on Metformin 500mg bid. Also h/o CAD and COPD. Here with COPD exacerbation> now on Prednisone 20mg daily. Pt w/hypoglycemic episode prior to dinner last night, fasting glucose >200mg/dl this AM. Pt seen at bedside. Reports has had poor appetite and nausea for the past 3 days. Requesting if she can have brother bring her in pancakes one time as she is hungry but nothing has been appetizing to her.       Review of Systems:  General: "I'm getting out of here next week"  GI: Intermittent nausea. Denies v/abd pain.   CV: No CP/SOB  ENDO: No S&Sx of hypoglycemia  MEDS:    insulin glargine Injectable (LANTUS) 7 Unit(s) SubCutaneous at bedtime  insulin lispro (HumaLOG) corrective regimen sliding scale   SubCutaneous three times a day before meals  insulin lispro (HumaLOG) corrective regimen sliding scale   SubCutaneous at bedtime  insulin lispro Injectable (HumaLOG) 4 Unit(s) SubCutaneous before lunch  insulin lispro Injectable (HumaLOG) 4 Unit(s) SubCutaneous before breakfast  insulin lispro Injectable (HumaLOG) 3 Unit(s) SubCutaneous with dinner  predniSONE   Tablet 20 milliGRAM(s) Oral daily    azithromycin   Tablet 250 milliGRAM(s) Oral <User Schedule>  nystatin    Suspension 403269 Unit(s) Oral four times a day    Allergies    No Known Allergies    PE:  General: Female sitting in chair. On BIPAP.   Vital Signs Last 24 Hrs  T(C): 36.7 (03 Jan 2019 03:46), Max: 37.3 (02 Jan 2019 22:05)  T(F): 98 (03 Jan 2019 03:46), Max: 99.1 (02 Jan 2019 22:05)  HR: 96 (03 Jan 2019 07:57) (88 - 100)  BP: 154/88 (03 Jan 2019 03:46) (151/75 - 154/88)  BP(mean): --  RR: 18 (03 Jan 2019 03:46) (18 - 20)  SpO2: 100% (03 Jan 2019 07:57) (98% - 100%)  Abd: Soft, NT,ND,   Extremities: Warm. L UE edema improved. B/L pedal edema   Neuro: A&O X3    LABS:    POCT Blood Glucose.: 213 mg/dL (01-03-19 @ 08:45)  POCT Blood Glucose.: 117 mg/dL (01-02-19 @ 22:03)  POCT Blood Glucose.: 81 mg/dL (01-02-19 @ 18:49)  POCT Blood Glucose.: 72 mg/dL (01-02-19 @ 18:16)  POCT Blood Glucose.: 77 mg/dL (01-02-19 @ 17:23)  POCT Blood Glucose.: 68 mg/dL (01-02-19 @ 17:21)  POCT Blood Glucose.: 139 mg/dL (01-02-19 @ 12:52)  POCT Blood Glucose.: 241 mg/dL (01-02-19 @ 08:56)  POCT Blood Glucose.: 153 mg/dL (01-02-19 @ 01:07)  POCT Blood Glucose.: 124 mg/dL (01-01-19 @ 21:13)  POCT Blood Glucose.: 137 mg/dL (01-01-19 @ 17:30)  POCT Blood Glucose.: 264 mg/dL (01-01-19 @ 12:55)  POCT Blood Glucose.: 227 mg/dL (12-31-18 @ 22:04)  POCT Blood Glucose.: 183 mg/dL (12-31-18 @ 17:34)  POCT Blood Glucose.: 255 mg/dL (12-31-18 @ 13:03)                            10.2   4.35  )-----------( 216      ( 03 Jan 2019 08:52 )             31.2       01-03    135  |  86<L>  |  20  ----------------------------<  152<H>  3.9   |  39<H>  |  0.99    Ca    9.5      03 Jan 2019 06:44  Phos  3.7     01-03  Mg     2.1     01-03    TPro  6.0  /  Alb  3.6  /  TBili  0.4  /  DBili  x   /  AST  28  /  ALT  43  /  AlkPhos  53  01-03      Hemoglobin A1C, Whole Blood: 7.2 % <H> [4.0 - 5.6] (11-30-18 @ 07:58)            Contact number: kateryna [phone] or [phone]

## 2019-01-03 NOTE — PROGRESS NOTE ADULT - SUBJECTIVE AND OBJECTIVE BOX
Patient is a 58y old  Female who presents with a chief complaint of dyspnea (03 Jan 2019 09:58)      SUBJECTIVE / OVERNIGHT EVENTS: reports had some constipation, mild subconjucntival hemorrhage right eye, no pain or blurry vision, UE swelling better, still with LE swelling, pt wants to be on bipap, nausea better    MEDICATIONS  (STANDING):  ALBUTerol/ipratropium for Nebulization 3 milliLiter(s) Nebulizer <User Schedule>  artificial tears (preservative free) Ophthalmic Solution 1 Drop(s) Both EYES three times a day  aspirin enteric coated 81 milliGRAM(s) Oral daily  azithromycin   Tablet 250 milliGRAM(s) Oral <User Schedule>  Biotene Dry Mouth Oral Rinse 5 milliLiter(s) Swish and Spit two times a day  buDESOnide   0.5 milliGRAM(s) Respule 0.5 milliGRAM(s) Inhalation every 12 hours  cholecalciferol 2000 Unit(s) Oral daily  dextrose 5%. 1000 milliLiter(s) (50 mL/Hr) IV Continuous <Continuous>  dextrose 50% Injectable 12.5 Gram(s) IV Push once  dextrose 50% Injectable 25 Gram(s) IV Push once  dextrose 50% Injectable 25 Gram(s) IV Push once  docusate sodium 100 milliGRAM(s) Oral three times a day  enoxaparin Injectable 40 milliGRAM(s) SubCutaneous every 24 hours  furosemide    Tablet 40 milliGRAM(s) Oral daily  insulin glargine Injectable (LANTUS) 7 Unit(s) SubCutaneous at bedtime  insulin lispro (HumaLOG) corrective regimen sliding scale   SubCutaneous three times a day before meals  insulin lispro (HumaLOG) corrective regimen sliding scale   SubCutaneous at bedtime  insulin lispro Injectable (HumaLOG) 4 Unit(s) SubCutaneous before lunch  insulin lispro Injectable (HumaLOG) 4 Unit(s) SubCutaneous before breakfast  insulin lispro Injectable (HumaLOG) 3 Unit(s) SubCutaneous with dinner  isosorbide   mononitrate ER Tablet (IMDUR) 30 milliGRAM(s) Oral daily  melatonin 1 milliGRAM(s) Oral at bedtime  montelukast 10 milliGRAM(s) Oral daily  multivitamin 1 Tablet(s) Oral daily  nystatin    Suspension 419549 Unit(s) Oral four times a day  pantoprazole    Tablet 40 milliGRAM(s) Oral before breakfast  polyethylene glycol 3350 17 Gram(s) Oral daily  predniSONE   Tablet 20 milliGRAM(s) Oral daily  senna 2 Tablet(s) Oral at bedtime  simethicone 80 milliGRAM(s) Chew once  theophylline ER Capsule 400 milliGRAM(s) Oral daily    MEDICATIONS  (PRN):  aluminum hydroxide/magnesium hydroxide/simethicone Suspension 30 milliLiter(s) Oral every 6 hours PRN Dyspepsia  bisacodyl Suppository 10 milliGRAM(s) Rectal daily PRN Constipation  dextrose 40% Gel 15 Gram(s) Oral once PRN Blood Glucose LESS THAN 70 milliGRAM(s)/deciliter  glucagon  Injectable 1 milliGRAM(s) IntraMuscular once PRN Glucose LESS THAN 70 milligrams/deciliter  ondansetron Injectable 4 milliGRAM(s) IV Push every 6 hours PRN Nausea and/or Vomiting  sodium chloride 0.65% Nasal 1 Spray(s) Both Nostrils two times a day PRN Nasal Congestion        CAPILLARY BLOOD GLUCOSE      POCT Blood Glucose.: 213 mg/dL (03 Jan 2019 08:45)  POCT Blood Glucose.: 117 mg/dL (02 Jan 2019 22:03)  POCT Blood Glucose.: 81 mg/dL (02 Jan 2019 18:49)  POCT Blood Glucose.: 72 mg/dL (02 Jan 2019 18:16)  POCT Blood Glucose.: 77 mg/dL (02 Jan 2019 17:23)  POCT Blood Glucose.: 68 mg/dL (02 Jan 2019 17:21)  POCT Blood Glucose.: 139 mg/dL (02 Jan 2019 12:52)    I&O's Summary    02 Jan 2019 07:01  -  03 Jan 2019 07:00  --------------------------------------------------------  IN: 1200 mL / OUT: 1600 mL / NET: -400 mL    03 Jan 2019 07:01  -  03 Jan 2019 10:51  --------------------------------------------------------  IN: 0 mL / OUT: 300 mL / NET: -300 mL        PHYSICAL EXAM:  GENERAL: NAD, well-developed  HEAD:  Atraumatic, Normocephalic  EYES: conjunctiva and sclera clear  NECK:  No JVD  CHEST/LUNG: Clear to auscultation bilaterally; No wheeze  HEART: Regular rate and rhythm;S1S2  ABDOMEN: Soft, Nontender, Nondistended; Bowel sounds present  EXTREMITIES: +1 UE edema, +1-2 Le edema with adalgisa stockings  PSYCH: AAOx3  NEUROLOGY: non-focal    LABS:                        10.2   4.35  )-----------( 216      ( 03 Jan 2019 08:52 )             31.2     01-03    135  |  86<L>  |  20  ----------------------------<  152<H>  3.9   |  39<H>  |  0.99    Ca    9.5      03 Jan 2019 06:44  Phos  3.7     01-03  Mg     2.1     01-03    TPro  6.0  /  Alb  3.6  /  TBili  0.4  /  DBili  x   /  AST  28  /  ALT  43  /  AlkPhos  53  01-03              RADIOLOGY & ADDITIONAL TESTS:    Imaging Personally Reviewed:    Consultant(s) Notes Reviewed:  ent, pulm     Care Discussed with Consultants/Other Providers:

## 2019-01-03 NOTE — PROGRESS NOTE ADULT - SUBJECTIVE AND OBJECTIVE BOX
CARDIOLOGY FOLLOW UP - Dr. Brunson    CC no cp/sob       PHYSICAL EXAM:  T(C): 36.7 (01-03-19 @ 03:46), Max: 37.3 (01-02-19 @ 22:05)  HR: 96 (01-03-19 @ 07:57) (88 - 100)  BP: 154/88 (01-03-19 @ 03:46) (151/75 - 154/88)  RR: 18 (01-03-19 @ 03:46) (18 - 20)  SpO2: 100% (01-03-19 @ 07:57) (98% - 100%)  Wt(kg): --  I&O's Summary    02 Jan 2019 07:01  -  03 Jan 2019 07:00  --------------------------------------------------------  IN: 1200 mL / OUT: 1600 mL / NET: -400 mL    03 Jan 2019 07:01  -  03 Jan 2019 09:59  --------------------------------------------------------  IN: 0 mL / OUT: 300 mL / NET: -300 mL        Appearance: Normal	  Cardiovascular: Normal S1 S2,RRR, No JVD, No murmurs  Respiratory: diminished   Gastrointestinal:  Soft, Non-tender, + BS	  Extremities: Normal range of motion, No clubbing, +1 b/l le edema         MEDICATIONS  (STANDING):  ALBUTerol/ipratropium for Nebulization 3 milliLiter(s) Nebulizer <User Schedule>  artificial tears (preservative free) Ophthalmic Solution 1 Drop(s) Both EYES three times a day  aspirin enteric coated 81 milliGRAM(s) Oral daily  azithromycin   Tablet 250 milliGRAM(s) Oral <User Schedule>  Biotene Dry Mouth Oral Rinse 5 milliLiter(s) Swish and Spit two times a day  buDESOnide   0.5 milliGRAM(s) Respule 0.5 milliGRAM(s) Inhalation every 12 hours  cholecalciferol 2000 Unit(s) Oral daily  dextrose 5%. 1000 milliLiter(s) (50 mL/Hr) IV Continuous <Continuous>  dextrose 50% Injectable 12.5 Gram(s) IV Push once  dextrose 50% Injectable 25 Gram(s) IV Push once  dextrose 50% Injectable 25 Gram(s) IV Push once  docusate sodium 100 milliGRAM(s) Oral three times a day  enoxaparin Injectable 40 milliGRAM(s) SubCutaneous every 24 hours  furosemide    Tablet 40 milliGRAM(s) Oral daily  insulin glargine Injectable (LANTUS) 7 Unit(s) SubCutaneous at bedtime  insulin lispro (HumaLOG) corrective regimen sliding scale   SubCutaneous three times a day before meals  insulin lispro (HumaLOG) corrective regimen sliding scale   SubCutaneous at bedtime  insulin lispro Injectable (HumaLOG) 4 Unit(s) SubCutaneous before lunch  insulin lispro Injectable (HumaLOG) 4 Unit(s) SubCutaneous before breakfast  insulin lispro Injectable (HumaLOG) 3 Unit(s) SubCutaneous with dinner  isosorbide   mononitrate ER Tablet (IMDUR) 30 milliGRAM(s) Oral daily  melatonin 1 milliGRAM(s) Oral at bedtime  montelukast 10 milliGRAM(s) Oral daily  multivitamin 1 Tablet(s) Oral daily  nystatin    Suspension 262401 Unit(s) Oral four times a day  pantoprazole    Tablet 40 milliGRAM(s) Oral before breakfast  polyethylene glycol 3350 17 Gram(s) Oral daily  predniSONE   Tablet 20 milliGRAM(s) Oral daily  senna 2 Tablet(s) Oral at bedtime  simethicone 80 milliGRAM(s) Chew once  theophylline ER Capsule 400 milliGRAM(s) Oral daily      TELEMETRY: 	    ECG:  	  RADIOLOGY:   DIAGNOSTIC TESTING:  [ ] Echocardiogram:  [ ]  Catheterization:  [ ] Stress Test:    OTHER: 	    LABS:	 	                                10.2   4.35  )-----------( 216      ( 03 Jan 2019 08:52 )             31.2     01-03    135  |  86<L>  |  20  ----------------------------<  152<H>  3.9   |  39<H>  |  0.99    Ca    9.5      03 Jan 2019 06:44  Phos  3.7     01-03  Mg     2.1     01-03    TPro  6.0  /  Alb  3.6  /  TBili  0.4  /  DBili  x   /  AST  28  /  ALT  43  /  AlkPhos  53  01-03

## 2019-01-03 NOTE — PROGRESS NOTE ADULT - ASSESSMENT
57 y/o F w/h/o controlled T2DM on Metformin 500mg bid. Also h/o CAD and COPD now on prednisone 20mg daily. Pt w/glucose variability due to erratic PO intake. Endorses nausea last few days and desire for outside food. Instructed to ask for insulin after meal if unsure of how much she will eat when eating hospital food. Discussed w/brother that she can have pancakes today as she has not eaten much in last few days. BG goal (100-180mg/dl).

## 2019-01-04 LAB
GLUCOSE BLDC GLUCOMTR-MCNC: 187 MG/DL — HIGH (ref 70–99)
GLUCOSE BLDC GLUCOMTR-MCNC: 225 MG/DL — HIGH (ref 70–99)
GLUCOSE BLDC GLUCOMTR-MCNC: 256 MG/DL — HIGH (ref 70–99)
GLUCOSE BLDC GLUCOMTR-MCNC: 287 MG/DL — HIGH (ref 70–99)
GLUCOSE BLDC GLUCOMTR-MCNC: 77 MG/DL — SIGNIFICANT CHANGE UP (ref 70–99)

## 2019-01-04 PROCEDURE — 99232 SBSQ HOSP IP/OBS MODERATE 35: CPT

## 2019-01-04 PROCEDURE — 99233 SBSQ HOSP IP/OBS HIGH 50: CPT

## 2019-01-04 RX ADMIN — POLYETHYLENE GLYCOL 3350 17 GRAM(S): 17 POWDER, FOR SOLUTION ORAL at 11:38

## 2019-01-04 RX ADMIN — Medication 81 MILLIGRAM(S): at 11:37

## 2019-01-04 RX ADMIN — Medication 100 MILLIGRAM(S): at 22:26

## 2019-01-04 RX ADMIN — Medication 100 MILLIGRAM(S): at 13:02

## 2019-01-04 RX ADMIN — Medication 500000 UNIT(S): at 11:37

## 2019-01-04 RX ADMIN — Medication 4 UNIT(S): at 13:02

## 2019-01-04 RX ADMIN — Medication 4 UNIT(S): at 09:08

## 2019-01-04 RX ADMIN — Medication 15 MILLIGRAM(S): at 05:54

## 2019-01-04 RX ADMIN — Medication 0.5 MILLIGRAM(S): at 05:54

## 2019-01-04 RX ADMIN — Medication 3 MILLILITER(S): at 05:54

## 2019-01-04 RX ADMIN — Medication 1 DROP(S): at 05:55

## 2019-01-04 RX ADMIN — ONDANSETRON 4 MILLIGRAM(S): 8 TABLET, FILM COATED ORAL at 22:25

## 2019-01-04 RX ADMIN — Medication 2000 UNIT(S): at 11:37

## 2019-01-04 RX ADMIN — ONDANSETRON 4 MILLIGRAM(S): 8 TABLET, FILM COATED ORAL at 11:41

## 2019-01-04 RX ADMIN — Medication 40 MILLIGRAM(S): at 05:54

## 2019-01-04 RX ADMIN — Medication 400 MILLIGRAM(S): at 09:09

## 2019-01-04 RX ADMIN — Medication 3 UNIT(S): at 17:49

## 2019-01-04 RX ADMIN — Medication 1 DROP(S): at 22:27

## 2019-01-04 RX ADMIN — Medication 3 MILLILITER(S): at 13:03

## 2019-01-04 RX ADMIN — Medication 3 MILLILITER(S): at 09:48

## 2019-01-04 RX ADMIN — Medication 3 MILLILITER(S): at 17:20

## 2019-01-04 RX ADMIN — Medication 3 MILLILITER(S): at 22:23

## 2019-01-04 RX ADMIN — PANTOPRAZOLE SODIUM 40 MILLIGRAM(S): 20 TABLET, DELAYED RELEASE ORAL at 05:55

## 2019-01-04 RX ADMIN — ISOSORBIDE MONONITRATE 30 MILLIGRAM(S): 60 TABLET, EXTENDED RELEASE ORAL at 11:37

## 2019-01-04 RX ADMIN — Medication 1 MILLIGRAM(S): at 22:26

## 2019-01-04 RX ADMIN — Medication 500000 UNIT(S): at 05:54

## 2019-01-04 RX ADMIN — SENNA PLUS 2 TABLET(S): 8.6 TABLET ORAL at 22:26

## 2019-01-04 RX ADMIN — Medication 2: at 09:08

## 2019-01-04 RX ADMIN — Medication 3: at 17:49

## 2019-01-04 RX ADMIN — Medication 1 DROP(S): at 13:03

## 2019-01-04 RX ADMIN — Medication 100 MILLIGRAM(S): at 05:54

## 2019-01-04 RX ADMIN — INSULIN GLARGINE 8 UNIT(S): 100 INJECTION, SOLUTION SUBCUTANEOUS at 22:27

## 2019-01-04 RX ADMIN — ENOXAPARIN SODIUM 40 MILLIGRAM(S): 100 INJECTION SUBCUTANEOUS at 22:25

## 2019-01-04 RX ADMIN — MONTELUKAST 10 MILLIGRAM(S): 4 TABLET, CHEWABLE ORAL at 11:37

## 2019-01-04 RX ADMIN — Medication 500000 UNIT(S): at 22:26

## 2019-01-04 RX ADMIN — Medication 1 TABLET(S): at 11:37

## 2019-01-04 RX ADMIN — Medication 0.5 MILLIGRAM(S): at 17:20

## 2019-01-04 NOTE — PROGRESS NOTE ADULT - PROBLEM SELECTOR PLAN 3
A1C 7.2  -c/w Lantus 8 units QHS  -c/w Humalog  4-4-3 w/meals   - Continue to monitor blood sugars.  - Endocrine following

## 2019-01-04 NOTE — PROGRESS NOTE ADULT - PROBLEM SELECTOR PLAN 1
-test BG AC/HS  -C/w Lantus 8 units QHS  -c/w Humalog 4-4-3 w/meals (ensure pt is eating prior to giving dose)  -c/w Humalog low correction scale AC and Low HS scale  -Please move Prednisone dose to 8am instead of 6am.    Please notify endocrine when steroids further tapered so we can adjust the does of insulin!!!  discharge plan: restart home Metformin if off steroids  -Plan discussed with pt/team/staff. If discharge to rehab on same steroid doses will continue basal/bolus therapy. Doses TBD  Contact info: [phone] (24/7). pager [phone]

## 2019-01-04 NOTE — PROGRESS NOTE ADULT - ASSESSMENT
58 F PMH COPD on home O2 3L, HTN, HLD, CAD s/p x6 stents, T2DM, pHTN, PVD, p/w progressive worsening dyspnea x 2 weeks now complicated by chronic progressive hypoxic respiratory failure.     1. Chronic progressive hypoxic respiratory failure  multifactorial in the setting of worsening COPD, CAD, chronic diastolic CHF, pulmonary HTN   bl feet edema improving, continue lasix 40mg PO daily   UE/ LE dopplers negative for DVT   echo with normal LV function, mild diastolic dysfunction, inadequate tricuspid regurg   cv stable no chest pain or sob   bipap PRN  continue dueonebs, inhaled steroids, singulair/theophylline/daliresp, pulm f/u   EKG reviewed, corrected QT WNL (aprox 462), on azithromycin    2. CAD s/p PCI  cv stable, continue asa, imdur    3. COPD   on bipap  continue Duoneb inhaled steroids, singulair/theophylline/daliresp, pulm f/u     4. hyponatremia/hyperkalemia  trend bmp, med f/u  dvt ppx   dc planning per primary team; Transfer to Pulmonary Rehab

## 2019-01-04 NOTE — PROGRESS NOTE ADULT - SUBJECTIVE AND OBJECTIVE BOX
Patient is a 58y old  Female who presents with a chief complaint of dyspnea (03 Jan 2019 11:11)      SUBJECTIVE / OVERNIGHT EVENTS: Still with UE and LE swelling, was off bipap for 6 hrs last night, currently on bipap, had bm, no pain     MEDICATIONS  (STANDING):  ALBUTerol/ipratropium for Nebulization 3 milliLiter(s) Nebulizer <User Schedule>  artificial tears (preservative free) Ophthalmic Solution 1 Drop(s) Both EYES three times a day  aspirin enteric coated 81 milliGRAM(s) Oral daily  azithromycin   Tablet 250 milliGRAM(s) Oral <User Schedule>  Biotene Dry Mouth Oral Rinse 5 milliLiter(s) Swish and Spit two times a day  buDESOnide   0.5 milliGRAM(s) Respule 0.5 milliGRAM(s) Inhalation every 12 hours  cholecalciferol 2000 Unit(s) Oral daily  dextrose 5%. 1000 milliLiter(s) (50 mL/Hr) IV Continuous <Continuous>  dextrose 50% Injectable 12.5 Gram(s) IV Push once  dextrose 50% Injectable 25 Gram(s) IV Push once  dextrose 50% Injectable 25 Gram(s) IV Push once  docusate sodium 100 milliGRAM(s) Oral three times a day  enoxaparin Injectable 40 milliGRAM(s) SubCutaneous every 24 hours  furosemide    Tablet 40 milliGRAM(s) Oral daily  insulin glargine Injectable (LANTUS) 8 Unit(s) SubCutaneous at bedtime  insulin lispro (HumaLOG) corrective regimen sliding scale   SubCutaneous three times a day before meals  insulin lispro (HumaLOG) corrective regimen sliding scale   SubCutaneous at bedtime  insulin lispro Injectable (HumaLOG) 4 Unit(s) SubCutaneous before breakfast  insulin lispro Injectable (HumaLOG) 3 Unit(s) SubCutaneous with dinner  insulin lispro Injectable (HumaLOG) 4 Unit(s) SubCutaneous before lunch  isosorbide   mononitrate ER Tablet (IMDUR) 30 milliGRAM(s) Oral daily  melatonin 1 milliGRAM(s) Oral at bedtime  montelukast 10 milliGRAM(s) Oral daily  multivitamin 1 Tablet(s) Oral daily  nystatin    Suspension 766805 Unit(s) Oral four times a day  pantoprazole    Tablet 40 milliGRAM(s) Oral before breakfast  polyethylene glycol 3350 17 Gram(s) Oral daily  predniSONE   Tablet 15 milliGRAM(s) Oral daily  predniSONE   Tablet   Oral   senna 2 Tablet(s) Oral at bedtime  simethicone 80 milliGRAM(s) Chew once  theophylline ER Capsule 400 milliGRAM(s) Oral daily    MEDICATIONS  (PRN):  aluminum hydroxide/magnesium hydroxide/simethicone Suspension 30 milliLiter(s) Oral every 6 hours PRN Dyspepsia  bisacodyl Suppository 10 milliGRAM(s) Rectal daily PRN Constipation  dextrose 40% Gel 15 Gram(s) Oral once PRN Blood Glucose LESS THAN 70 milliGRAM(s)/deciliter  glucagon  Injectable 1 milliGRAM(s) IntraMuscular once PRN Glucose LESS THAN 70 milligrams/deciliter  ondansetron Injectable 4 milliGRAM(s) IV Push every 6 hours PRN Nausea and/or Vomiting  sodium chloride 0.65% Nasal 1 Spray(s) Both Nostrils two times a day PRN Nasal Congestion        CAPILLARY BLOOD GLUCOSE      POCT Blood Glucose.: 225 mg/dL (04 Jan 2019 08:53)  POCT Blood Glucose.: 89 mg/dL (03 Jan 2019 22:04)  POCT Blood Glucose.: 211 mg/dL (03 Jan 2019 17:25)  POCT Blood Glucose.: 114 mg/dL (03 Jan 2019 12:31)    I&O's Summary    03 Jan 2019 07:01  -  04 Jan 2019 07:00  --------------------------------------------------------  IN: 550 mL / OUT: 300 mL / NET: 250 mL        PHYSICAL EXAM:  GENERAL: NAD, well-developed  HEAD:  Atraumatic, Normocephalic  EYES: conjunctiva and sclera clear  NECK: No JVD  CHEST/LUNG: bronchial breath sounds pt on bipap   HEART: Regular rate and rhythm; S1S2  ABDOMEN: Soft, Nontender, Nondistended; Bowel sounds present  EXTREMITIES:  +1 UE edema, +2 LE edema , ace wrap   PSYCH: AAOx3  NEUROLOGY: non-focal      LABS:                        10.2   4.35  )-----------( 216      ( 03 Jan 2019 08:52 )             31.2     01-03    135  |  86<L>  |  20  ----------------------------<  152<H>  3.9   |  39<H>  |  0.99    Ca    9.5      03 Jan 2019 06:44  Phos  3.7     01-03  Mg     2.1     01-03    TPro  6.0  /  Alb  3.6  /  TBili  0.4  /  DBili  x   /  AST  28  /  ALT  43  /  AlkPhos  53  01-03              RADIOLOGY & ADDITIONAL TESTS:    Imaging Personally Reviewed:    Consultant(s) Notes Reviewed:  pulm, endocrine     Care Discussed with Consultants/Other Providers:

## 2019-01-04 NOTE — PROGRESS NOTE ADULT - ASSESSMENT
ASSESSMENT:    ongoing dyspnea with minimal exertion with chronic hypoxic and hypercapnic respiratory failure due to very severe COPD with "exacerbation" - adequate oxygenation on a nasal canula in the setting of profound dyspnea suggests that alterations in the mechanics of breathing due to lung hyperinflation and obesity are likely playing a significant role in her shortness of breath - anxiety is also playing a role - there is evidence of CAD without pulmonary hypertension on the CT scan which is without pulmonary emboli or pneumonia - cardiac catheterization last year revealed patent stents - ECHO has a preserved LVEF, no significant valvular heart disease and no pulmonary hypertension - resolved AURORA, hyperkalemia and hyponatremia - resolved respiratory acidosis on repeat ABG and the pCO2 level has decreased from the 80s back to the 60s - lower extremity weakness likely due to steroid induced myopathy perhaps exacerbated by statin is improving    extremity edema/discomfort likely related to steroid induced fluid retention - no evidence of DVT on repeat upper or lower extremity Duplex examination - improved upper extremity swelling but increasing lower extremity edema is making ambulation more difficulty    HTN/HLD/DM    CAD s/p PCI x 6    PAD    PLAN/RECOMMENDATIONS:    BIPAP 12/5 with 40% FiO2 for sleep - on and off during the day for increased work of breathing or recurrent respiratory acidosis - we will need to wean her off daytime BIPAP for transfer to an acute rehabilitation center  oxygen supplementation to keep saturation greater than 92% using a nasal canula when off BIPAP  follow ABG  sputum culture - no growth x2 - has completed a course of biaxin  home trilogy vent has been arranged [facility]   albuterol/atrovent nebs q4h holding 2AM dose  pulmicort 0.5mg nebs q12h  singulair/theophylline - theophylline level is subtherapeutic - daliresp discontinued  prednisone 15mg daily - decrease by 5mg every 5 days - glucose control - nystatin   azithromycin TIW for antiinflammatory effects  cardiology evaluation noted  cardiac meds: ASA/imdur/lasix - off crestor with possible myopathy - off norvasc due to swelling - BP control adequate  diurese as tolerated by renal function and hemodynamics - watch for worsening metabolic alkalosis with loop diuretic use which could lead to worsening hypercapnia  GI/DVT prophylaxis - protoix/SQ lovenox  physical therapy daily  ACE bandage lower extremity wraps  holistic nurse evaluation Monday  transfer to acute rehab when able  outpatient evaluation for lung transplantation    Will follow with you. Plan of care discussed with the patient and her family at bedside and the dedicated floor NP and . Hopeful discharge to acute rehab on Monday.     David Fair MD, [facility] - [phone]  Pulmonary Medicine

## 2019-01-04 NOTE — PROGRESS NOTE ADULT - SUBJECTIVE AND OBJECTIVE BOX
Diabetes Follow up note: Saw pt this am  Interval Hx: 59 y/o F w/h/o controlled T2DM on Metformin 500mg bid. Also h/o CAD and COPD. Here with COPD exacerbation> Prednisone dose decreased to 15mg daily today. Pt with variable PO intake due to on and off nausea specially in the mornings making BG levels difficult to control. Didn't eat much for breakfast today. No hypoglycemia. BG at hs yesterday was 89 and per pt, she was given juice/apple/peanut butter> FBG >200s requiring more insulin coverage with f/u BG of 77 ac lunch after not eating much for breakfast.      Review of Systems:  General: " when I'm having nausea I can't eat"  GI: Intermittent nausea. Denies v/abd pain.   CV: No CP/SOB  ENDO: No S&Sx of hypoglycemia      MEDS:  insulin glargine Injectable (LANTUS) 8 Unit(s) SubCutaneous at bedtime  insulin lispro (HumaLOG) corrective regimen sliding scale   SubCutaneous three times a day before meals  insulin lispro (HumaLOG) corrective regimen sliding scale   SubCutaneous at bedtime  insulin lispro Injectable (HumaLOG) 4 Unit(s) SubCutaneous before lunch  insulin lispro Injectable (HumaLOG) 4 Unit(s) SubCutaneous before breakfast  insulin lispro Injectable (HumaLOG) 3 Unit(s) SubCutaneous with dinner  predniSONE   Tablet 15 milliGRAM(s) Oral daily  azithromycin   Tablet 250 milliGRAM(s) Oral <User Schedule>  nystatin    Suspension 682717 Unit(s) Oral four times a day  cholecalciferol 2000 Unit(s) Oral daily  theophylline ER Capsule 400 milliGRAM(s) Oral daily    Allergies  No Known Allergies    PE:  General: Female sitting in chair. On BIPAP on and off.   Vital Signs Last 24 Hrs  T(C): 36.8 (01-04-19 @ 13:09), Max: 36.8 (01-04-19 @ 13:09)  T(F): 98.2 (01-04-19 @ 13:09), Max: 98.2 (01-04-19 @ 13:09)  HR: 92 (01-04-19 @ 13:09) (75 - 92)  BP: 149/91 (01-04-19 @ 13:09) (142/74 - 157/82)  BP(mean): --  RR: 18 (01-04-19 @ 13:09) (18 - 18)  SpO2: 100% (01-04-19 @ 13:09) (98% - 100%)  Abd: Soft, NT, ND, obese   Extremities: Warm. LUE edema improved. B/L pedal edema   Neuro: A&O X3    LABS:  POCT Blood Glucose.: 77 mg/dL (01-04-19 @ 12:59)  POCT Blood Glucose.: 225 mg/dL (01-04-19 @ 08:53)  POCT Blood Glucose.: 89 mg/dL (01-03-19 @ 22:04)  POCT Blood Glucose.: 211 mg/dL (01-03-19 @ 17:25)  POCT Blood Glucose.: 114 mg/dL (01-03-19 @ 12:31)  POCT Blood Glucose.: 213 mg/dL (01-03-19 @ 08:45)  POCT Blood Glucose.: 117 mg/dL (01-02-19 @ 22:03)  POCT Blood Glucose.: 81 mg/dL (01-02-19 @ 18:49)  POCT Blood Glucose.: 72 mg/dL (01-02-19 @ 18:16)  POCT Blood Glucose.: 77 mg/dL (01-02-19 @ 17:23)  POCT Blood Glucose.: 68 mg/dL (01-02-19 @ 17:21)                           10.2   4.35  )-----------( 216      ( 03 Jan 2019 08:52 )             31.2       01-03    135  |  86<L>  |  20  ----------------------------<  152<H>  3.9   |  39<H>  |  0.99    Ca    9.5      03 Jan 2019 06:44  Phos  3.7     01-03  Mg     2.1     01-03    TPro  6.0  /  Alb  3.6  /  TBili  0.4  /  DBili  x   /  AST  28  /  ALT  43  /  AlkPhos  53  01-03      Hemoglobin A1C, Whole Blood: 7.2 % <H> [4.0 - 5.6] (11-30-18 @ 07:58)

## 2019-01-04 NOTE — PROGRESS NOTE ADULT - PROBLEM SELECTOR PLAN 1
Prednisone taper per pulm   Continue Pulmicort, Duoneb ATC   c/w Theophylline, Singulair.  Completed 7 days of biaxin   Home Trilogy vent arranged by pulmonary.  c/w intermittent bipap ; alternating with 5L NC  C/w daliresp  cw azithromycin   Plan for discharge to acute pulmonary rehab  Appreciate pm&r recs  ENT evaluated, pt s/p laryngoscope wnl as part of preopr workup prior to transplant   Pulm coordinating lung transplant at [place]

## 2019-01-04 NOTE — PROGRESS NOTE ADULT - SUBJECTIVE AND OBJECTIVE BOX
CARDIOLOGY FOLLOW UP - Dr. Brunson    CC no cp or sob       PHYSICAL EXAM:  T(C): 36.5 (01-04-19 @ 05:51), Max: 36.9 (01-03-19 @ 12:32)  HR: 91 (01-04-19 @ 05:52) (75 - 97)  BP: 142/74 (01-04-19 @ 05:51) (127/82 - 157/82)  RR: 18 (01-04-19 @ 05:51) (18 - 18)  SpO2: 100% (01-04-19 @ 05:52) (98% - 100%)  Wt(kg): --  I&O's Summary    03 Jan 2019 07:01  -  04 Jan 2019 07:00  --------------------------------------------------------  IN: 550 mL / OUT: 300 mL / NET: 250 mL        Appearance: Normal	  Cardiovascular: Normal S1 S2,RRR, No JVD, No murmurs  Respiratory: Diminished   Gastrointestinal:  Soft, Non-tender, + BS	  Extremities: Normal range of motion, bl+1 le  edema        MEDICATIONS  (STANDING):  ALBUTerol/ipratropium for Nebulization 3 milliLiter(s) Nebulizer <User Schedule>  artificial tears (preservative free) Ophthalmic Solution 1 Drop(s) Both EYES three times a day  aspirin enteric coated 81 milliGRAM(s) Oral daily  azithromycin   Tablet 250 milliGRAM(s) Oral <User Schedule>  Biotene Dry Mouth Oral Rinse 5 milliLiter(s) Swish and Spit two times a day  buDESOnide   0.5 milliGRAM(s) Respule 0.5 milliGRAM(s) Inhalation every 12 hours  cholecalciferol 2000 Unit(s) Oral daily  dextrose 5%. 1000 milliLiter(s) (50 mL/Hr) IV Continuous <Continuous>  dextrose 50% Injectable 12.5 Gram(s) IV Push once  dextrose 50% Injectable 25 Gram(s) IV Push once  dextrose 50% Injectable 25 Gram(s) IV Push once  docusate sodium 100 milliGRAM(s) Oral three times a day  enoxaparin Injectable 40 milliGRAM(s) SubCutaneous every 24 hours  furosemide    Tablet 40 milliGRAM(s) Oral daily  insulin glargine Injectable (LANTUS) 8 Unit(s) SubCutaneous at bedtime  insulin lispro (HumaLOG) corrective regimen sliding scale   SubCutaneous three times a day before meals  insulin lispro (HumaLOG) corrective regimen sliding scale   SubCutaneous at bedtime  insulin lispro Injectable (HumaLOG) 4 Unit(s) SubCutaneous before breakfast  insulin lispro Injectable (HumaLOG) 3 Unit(s) SubCutaneous with dinner  insulin lispro Injectable (HumaLOG) 4 Unit(s) SubCutaneous before lunch  isosorbide   mononitrate ER Tablet (IMDUR) 30 milliGRAM(s) Oral daily  melatonin 1 milliGRAM(s) Oral at bedtime  montelukast 10 milliGRAM(s) Oral daily  multivitamin 1 Tablet(s) Oral daily  nystatin    Suspension 176649 Unit(s) Oral four times a day  pantoprazole    Tablet 40 milliGRAM(s) Oral before breakfast  polyethylene glycol 3350 17 Gram(s) Oral daily  predniSONE   Tablet 15 milliGRAM(s) Oral daily  predniSONE   Tablet   Oral   senna 2 Tablet(s) Oral at bedtime  simethicone 80 milliGRAM(s) Chew once  theophylline ER Capsule 400 milliGRAM(s) Oral daily      TELEMETRY: 	    ECG:  	  RADIOLOGY:   DIAGNOSTIC TESTING:  [ ] Echocardiogram:  [ ]  Catheterization:  [ ] Stress Test:    OTHER: 	    LABS:	 	                                10.2   4.35  )-----------( 216      ( 03 Jan 2019 08:52 )             31.2     01-03    135  |  86<L>  |  20  ----------------------------<  152<H>  3.9   |  39<H>  |  0.99    Ca    9.5      03 Jan 2019 06:44  Phos  3.7     01-03  Mg     2.1     01-03    TPro  6.0  /  Alb  3.6  /  TBili  0.4  /  DBili  x   /  AST  28  /  ALT  43  /  AlkPhos  53  01-03

## 2019-01-04 NOTE — PROGRESS NOTE ADULT - PROVIDER SPECIALTY LIST ADULT
Cardiology EP Progress Note    EP Attending:Dr Balderrama  NP/PA/Fellow: JAMIE Ayala   Pager [phone]    CHIEF COMPLAINT: tachybrady syndrome, SA node dysfunction  Subjective: patient states she feels well and is hoping to go home. Denies chest pain or SOB. States her incision to left chest is tender if touched otherwise she is comfortable.    EP Medications:warfarin, Toprol XL    Current Facility-Administered Medications   Medication   • warfarin (COUMADIN) tablet 4 mg   • HYDROcodone-acetaminophen (NORCO) 5-325 MG per tablet 1-2 tablet   • metoPROLOL succinate (TOPROL-XL) ER tablet 75 mg   • potassium chloride (K-DUR,KLOR-CON) CR tablet 20 mEq   • potassium chloride (KLOR-CON) packet 20 mEq   • potassium chloride 20 mEq/100mL IVPB premix   • potassium chloride (K-DUR,KLOR-CON) CR tablet 40 mEq   • potassium chloride (KLOR-CON) packet 40 mEq   • potassium chloride 20 mEq/100mL IVPB premix   • magnesium sulfate 1 g in dextrose 5% 100 mL IVPB premix   • magnesium sulfate 2 g in 50 mL premix IVPB   • magnesium sulfate 2 g in 50 mL premix IVPB   • amLODIPine (NORVASC) tablet 5 mg   • hydrALAZINE (APRESOLINE) injection 10 mg   • meclizine (ANTIVERT) tablet 25 mg   • WARFARIN - PHARMACIST MONITORED   • sodium chloride (PF) 0.9 % injection 2 mL   • sodium chloride (PF) 0.9 % injection 2 mL   • albuterol-ipratropium (COMBIVENT RESPIMAT) inhaler 1 puff   • albuterol-ipratropium 2.5 mg/0.5 mg (DUONEB) nebulizer solution 3 mL   • clopidogrel (PLAVIX) tablet 75 mg   • fluticasone-salmeterol (ADVAIR DISKUS) 250-50 MCG/DOSE inhaler 1 puff   • isosorbide mononitrate (IMDUR) ER tablet 30 mg   • losartan (COZAAR) tablet 25 mg   • pravastatin (PRAVACHOL) tablet 20 mg   • acetaminophen (TYLENOL) tablet 650 mg    Or   • acetaminophen (TYLENOL) suppository 650 mg   • sodium chloride (NORMAL SALINE) 0.9 % bolus 500 mL   • nitroGLYcerin (NITROSTAT) sublingual tablet 0.4 mg   • ondansetron (ZOFRAN) injection 4 mg   • prochlorperazine  (COMPAZINE) tablet 5 mg   • prochlorperazine (COMPAZINE) injection 5 mg   • docusate calcium (SURFAK) capsule 240 mg         ALLERGIES:   Allergen Reactions   • Augmentin [Amoclan]        Visit Vitals  /90 (BP Location: [facility], Patient Position: Sitting)   Pulse 64   Temp 98.1 °F (36.7 °C) (Oral)   Resp 18   Ht 5' 2\" (1.575 m)   Wt 86.2 kg   SpO2 91%   BMI 34.76 kg/m²       REVIEW OF SYSTEMS  Review of Systems   Constitutional: Negative for activity change, chills, diaphoresis and fever.   Respiratory: Negative for cough, chest tightness, shortness of breath and wheezing.    Cardiovascular: Negative for chest pain, palpitations and leg swelling.   Gastrointestinal: Negative for abdominal pain and nausea.   Neurological: Negative for dizziness, tremors, weakness and light-headedness.   All other systems reviewed and are negative.        PHYSICAL EXAMINATION  Physical Exam   Constitutional: She is oriented to person, place, and time. She appears well-developed and well-nourished. No distress.   HENT:   Head: Normocephalic.   Neck: Normal range of motion.   Cardiovascular: Normal rate.  Exam reveals no gallop.    No murmur heard.  Pulmonary/Chest: Effort normal. No respiratory distress. She has no wheezes. She has no rales.   Musculoskeletal: Normal range of motion. She exhibits no edema.   Neurological: She is alert and oriented to person, place, and time.   Skin: Skin is warm. She is not diaphoretic.   Left pectoral incision assessed- steri strips intact, scant dried blood to dressing, no oozing, no erythema. Scant ecchymosis surrounding pocket. No hematoma   Psychiatric: She has a normal mood and affect. Her behavior is normal. Thought content normal.         RHYTHM/Telemetry Personally Reviewed: APVS - short episodes of AVNRT.  LAB  Lab Results   Component Value Date    SODIUM 138 01/27/2018    SODIUM 137 01/26/2018    CHLORIDE 104 01/27/2018    CHLORIDE 103 01/26/2018    CO2 26 01/27/2018    CO2 27 01/26/2018     POTASSIUM 4.2 01/27/2018    POTASSIUM 4.7 01/26/2018    BUN 16 01/27/2018    BUN 19 01/26/2018    CREATININE 0.70 01/27/2018    CREATININE 0.80 01/26/2018    GLUCOSE 99 01/27/2018    GLUCOSE 99 01/26/2018     No results found for: BNP  Lab Results   Component Value Date    WBC 9.3 01/27/2018    WBC 8.7 01/26/2018    HCT 40.0 01/27/2018    HCT 38.6 01/26/2018    HGB 12.9 01/27/2018    HGB 12.5 01/26/2018     01/27/2018     01/26/2018     No results found for: DIMER  Lab Results   Component Value Date    INR 1.2 01/27/2018    INR 2.1 01/26/2018     Lab Results   Component Value Date    RAPDTR 0.03 01/22/2018     Lab Results   Component Value Date    MG 2.3 01/27/2018    MG 2.2 01/26/2018     Lab Results   Component Value Date    CREATININE 0.70 01/27/2018    CREATININE 0.80 01/26/2018       Ejection Fraction   Date Value Ref Range Status   01/23/2018 60.0 % Final           Impression:     1. Tachybrady syndrome    2. Sinus node dysfunction    3. Paroxysmal atrial fibrillation  CHADSVASC Stroke Risk Score = 7  On warfarin    4. Long term anticoagulation    5. S/P dual chamber pacemaker placement 01/26/2018 [facility]  Normal function per interrogation  18%  98%AP  No AF    6. AVNRT    Recommendation:  89 year old with tachybrady syndrome, sinus node dysfunction and paroxysmal atrial fibrillation who underwent placement of dual chamber pacemaker on 01/26/2018. Device interrogation today shows stable leads with normal lead impedance, sensing and thresholds and 78% AP in DDIR with no events. Wound care, remote monitoring and activity restriction reviewed. Wound assessed and redressed with light gauze.  Discussed teaching with RN who will reinforce. Follow up with Dr Balderrama on 02/09/2018.      The above assessment and plan was discussed and formulated in conjunction with JAMIE Patino    I, Dr. Balderrama, saw and examined the patient, I have reviewed the findings, and agree with the  physical exam, impression, and recoomendation mentioned in JAMIE Ayala's above note.  Patient doing well post pacemaker implantation. Has short episode of AVNRT overnight however frequency significantly decreased after starting metoprolol. On exam regular rate and rhythm.   Would increase metoprolol 75 mg twice a day. Ok to discharge home from EP standpoint. Follow-up with me in 2 weeks.    Rama Balderrama MD   [id number]    For EP questions between 7am-5pm please contact the NP/Fellow listed above. After 5pm and before 7am contact the answering service at [phone] and page on call EP Fellow.

## 2019-01-04 NOTE — PROGRESS NOTE ADULT - ASSESSMENT
59 y/o F w/h/o controlled T2DM on Metformin 500mg bid. Also h/o CAD and COPD now on prednisone 15 mg daily. Pt w/glucose levels variable depending on PO intake that has been affected by nausea specially in am. Also eating food from outside on and off. No further hypoglycemia. Spoke to pt/staff and team about moving Prednisone dose to 8am with breakfast instead of 5-6am to decrease GI complain. Also instructed pt about not taking meal time insulin if not eating. She verbalized understanding.     Will continue present insulin regimen for now.

## 2019-01-04 NOTE — PROGRESS NOTE ADULT - SUBJECTIVE AND OBJECTIVE BOX
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; ABG with improved hypercapnia without acidemia; able to stand up without difficulty but has severe leg swelling and discomfort; less arm swelling on diuretics and reduced dose of steroids; resolved AURORA and hyponatremia; no cough, sputum production, chest congestion or wheeze; no fevers, chills or sweats; no chest pain/pressure or palpitations; legs wrapped with ACE bandages    REVIEW OF SYSTEMS:  Constitutional: As per interval history  HEENT: Within normal limits  CV: As per interval history  Resp: As per interval history  GI: Within normal limits   : Within normal limits  Musculoskeletal: improving lower extremity weakness   Skin: Within normal limits  Neurological: Within normal limits  Psychiatric: frustrated and anxious  Endocrine: Within normal limits  Hematologic/Lymphatic: Within normal limits  Allergic/Immunologic: Within normal limits      MEDICATIONS:     Pulmonary "  ALBUTerol/ipratropium for Nebulization 3 milliLiter(s) Nebulizer <User Schedule>  buDESOnide   0.5 milliGRAM(s) Respule 0.5 milliGRAM(s) Inhalation every 12 hours  montelukast 10 milliGRAM(s) Oral daily  theophylline ER Capsule 400 milliGRAM(s) Oral daily      Anti-microbials:  azithromycin   Tablet 250 milliGRAM(s) Oral <User Schedule>  nystatin    Suspension [id number] Unit(s) Oral four times a day      Cardiovascular:  furosemide    Tablet 40 milliGRAM(s) Oral daily  isosorbide   mononitrate ER Tablet (IMDUR) 30 milliGRAM(s) Oral daily      Other:  aluminum hydroxide/magnesium hydroxide/simethicone Suspension 30 milliLiter(s) Oral every 6 hours PRN  artificial tears (preservative free) Ophthalmic Solution 1 Drop(s) Both EYES three times a day  aspirin enteric coated 81 milliGRAM(s) Oral daily  Biotene Dry Mouth Oral Rinse 5 milliLiter(s) Swish and Spit two times a day  bisacodyl Suppository 10 milliGRAM(s) Rectal daily PRN  cholecalciferol 2000 Unit(s) Oral daily  dextrose 40% Gel 15 Gram(s) Oral once PRN  dextrose 5%. 1000 milliLiter(s) IV Continuous <Continuous>  dextrose 50% Injectable 12.5 Gram(s) IV Push once  dextrose 50% Injectable 25 Gram(s) IV Push once  dextrose 50% Injectable 25 Gram(s) IV Push once  docusate sodium 100 milliGRAM(s) Oral three times a day  enoxaparin Injectable 40 milliGRAM(s) SubCutaneous every 24 hours  glucagon  Injectable 1 milliGRAM(s) IntraMuscular once PRN  insulin glargine Injectable (LANTUS) 8 Unit(s) SubCutaneous at bedtime  insulin lispro (HumaLOG) corrective regimen sliding scale   SubCutaneous three times a day before meals  insulin lispro (HumaLOG) corrective regimen sliding scale   SubCutaneous at bedtime  insulin lispro Injectable (HumaLOG) 4 Unit(s) SubCutaneous before breakfast  insulin lispro Injectable (HumaLOG) 3 Unit(s) SubCutaneous with dinner  insulin lispro Injectable (HumaLOG) 4 Unit(s) SubCutaneous before lunch  melatonin 1 milliGRAM(s) Oral at bedtime  multivitamin 1 Tablet(s) Oral daily  ondansetron Injectable 4 milliGRAM(s) IV Push every 6 hours PRN  pantoprazole    Tablet 40 milliGRAM(s) Oral before breakfast  polyethylene glycol 3350 17 Gram(s) Oral daily  predniSONE   Tablet 15 milliGRAM(s) Oral daily  predniSONE   Tablet   Oral   senna 2 Tablet(s) Oral at bedtime  simethicone 80 milliGRAM(s) Chew once  sodium chloride 0.65% Nasal 1 Spray(s) Both Nostrils two times a day PRN        OBJECTIVE:    I&O's Detail    03 Jan 2019 07:01  -  04 Jan 2019 07:00  --------------------------------------------------------  IN:    Oral Fluid: 550 mL  Total IN: 550 mL    OUT:    Voided: 300 mL  Total OUT: 300 mL    Total NET: 250 mL      04 Jan 2019 07:01  -  04 Jan 2019 13:19  --------------------------------------------------------  IN:  Total IN: 0 mL    OUT:    Voided: 300 mL  Total OUT: 300 mL    Total NET: -300 mL    POCT Blood Glucose.: 77 mg/dL (04 Jan 2019 12:59)  POCT Blood Glucose.: 225 mg/dL (04 Jan 2019 08:53)  POCT Blood Glucose.: 89 mg/dL (03 Jan 2019 22:04)  POCT Blood Glucose.: 211 mg/dL (03 Jan 2019 17:25)      PHYSICAL EXAM:       ICU Vital Signs Last 24 Hrs  T(C): 36.8 (04 Jan 2019 13:09), Max: 36.8 (04 Jan 2019 13:09)  T(F): 98.2 (04 Jan 2019 13:09), Max: 98.2 (04 Jan 2019 13:09)  HR: 92 (04 Jan 2019 13:09) (75 - 92)  BP: 149/91 (04 Jan 2019 13:09) (142/74 - 157/82)  BP(mean): --  ABP: --  ABP(mean): --  RR: 18 (04 Jan 2019 13:09) (18 - 18)  SpO2: 100% (04 Jan 2019 13:09) (98% - 100%) on 4lpm nasal canula     General: Awake. Alert. Cooperative. No distress. Appears stated age. Obese. Cushingoid.  HEENT:  Atraumatic. Moonlike facies. Anicteric. Normal oral mucosa. PERRL. EOMI.   Neck: Supple. Trachea midline. Thyroid without enlargement/tenderness/nodules. No carotid bruit. No JVD.	  Cardiovascular: Regular rate and rhythm. Distant S1 S2. No murmurs, rubs or gallops.  Respiratory: Respirations unlabored. Markedly decreased breath sounds throughout. No wheeze. No curvature.  Abdomen: Soft. Non-tender. Non-distended. No organomegaly. No masses. Normal bowel sounds. Obese.  Extremities: Warm to touch. No clubbing or cyanosis. Moderate bilateral lower extremity edema up to the thigh. Legs wrapped with ACE bandages. Upper extremity swelling much improved  Pulses: Decreased lower extremity peripheral pulses.  Skin: No rashes or lesions. No ecchymoses. No cyanosis. Warm to touch.   Lymph Nodes: Cervical, supraclavicular and axillary nodes normal  Neurological: Motor and sensory examination equal and normal. A and O x 3  Psychiatry: Appropriate mood and affect.      LABS:                        10.2   4.35  )-----------( 216      ( 03 Jan 2019 08:52 )             31.2     01-03    135  |  86<L>  |  20  ----------------------------<  152<H>  3.9   |  39<H>  |  0.99    01-01    139  |  89<L>  |  30<H>  ----------------------------<  258<H>  4.3   |  39<H>  |  0.96    Ca      9.5      01-03    Ca      9.7      01-01    Phos    3.7     01-03      Mg       2.1     01-03    TPro  6.0  /  Alb  3.6  /  TBili  0.4  /  DBili  x   /  AST  28  /  ALT  43  /  AlkPhos  53  01-03    ABG - ( 03 Jan 2019 06:40 )  pH: 7.44  /  pCO2: 65    /  pO2: 152   / HCO3: 43    / Base Excess: 15.9  /  SaO2: 97        ABG - ( 01 Jan 2019 03:44 )  pH: 7.42  /  pCO2: 69    /  pO2: 165   / HCO3: 44    / Base Excess: 17.3  /  SaO2: 97        ABG - ( 31 Dec 2018 16:19 )  pH: 7.38  /  pCO2: 84    /  pO2: 26    / HCO3: 48    / Base Excess: 20.2  /  SaO2: 41        ABG - ( 18 Dec 2018 13:23 )  pH: 7.46  /  pCO2: 47    /  pO2: 88    / HCO3: 33    / Base Excess: 8.0   /  SaO2: 97        ABG - ( 16 Dec 2018 20:53 )  pH: 7.44  /  pCO2: 53    /  pO2: 173   / HCO3: 36    / Base Excess: 10.0  /  SaO2: 100       ABG - ( 13 Dec 2018 06:29 )  pH: 7.30  /  pCO2: 71    /  pO2: 182   / HCO3: 34    / Base Excess: 5.8   /  SaO2: 99        ABG - ( 13 Dec 2018 00:59 )  pH: 7.32  /  pCO2: 71    /  pO2: 75    / HCO3: 35    / Base Excess: 7.1   /  SaO2: 95        ABG - ( 11 Dec 2018 11:45 )  pH: 7.39  /  pCO2: 54    /  pO2: 98    / HCO3: 32    / Base Excess: 6.2   /  SaO2: 98        ABG - ( 09 Dec 2018 11:26 )  pH: 7.35  /  pCO2: 63    /  pO2: 145   / HCO3: 34    / Base Excess: 6.6   /  SaO2: 99      ABG - ( 09 Dec 2018 00:28 )  pH: 7.32  /  pCO2: 71    /  pO2: 124   / HCO3: 36    / Base Excess: 7.6   /  SaO2: 99        ABG - ( 08 Dec 2018 20:50 )  pH: 7.32  /  pCO2: 72    /  pO2: 80    / HCO3: 36    / Base Excess: 7.7   /  SaO2: 95        Serum Pro-Brain Natriuretic Peptide: 254 pg/mL (12-13 @ 14:00)    < from: Transthoracic Echocardiogram (12.07.18 @ 08:59) >    Patient name: GUY HARTMAN  YOB: 1960   Age: 58 (F)   MR#: [id number]  Study Date: 12/7/2018  Location: [address] [place], [place] [id number]: Laquita Armando [facility]  Study quality: Technically good  Referring Physician: Allen ingram MD  BloodPressure: 120/80 mmHg  Height: 163 cm  Weight: 88 kg  BSA: 1.9 m2  ------------------------------------------------------------------------  PROCEDURE: Transthoracic echocardiogram with 2-D, M-Mode  and complete spectral and color flow Doppler.  INDICATION: Other forms of dyspnea (R06.09)  ------------------------------------------------------------------------  Dimensions:    Normal Values:  LA:     3.6    2.0 - 4.0 cm  Ao:     2.9    2.0 - 3.8 cm  SEPTUM: 0.9    0.6 - 1.2 cm  PWT:    0.8    0.6- 1.1 cm  LVIDd:  4.9    3.0 - 5.6 cm  LVIDs:  2.9    1.8 - 4.0 cm  Derived variables:  LVMI: 73 g/m2  RWT: 0.32  Fractional short: 40 %  EF (Teicholtz): 71 %  Doppler Peak Velocity (m/sec): AoV=1.2  ------------------------------------------------------------------------  Observations:  Mitral Valve: Normal mitral valve.  Aortic Valve/Aorta: Normal trileaflet aortic valve. Peak  transaortic valve gradient equals 6 mm Hg, mean transaortic  valve gradient equals 3 mm Hg, aortic valve velocity time  integral equals 22 cm. Trace aortic regurgitation.  Aortic Root: 2.9 cm.  Left Atrium: Normal left atrium.  LA volume index = 18  cc/m2.  Left Ventricle: Normal left ventricular systolic function.  No segmental wall motion abnormalities. Normal left  ventricular internal dimensions and wall thicknesses. Mild  diastolic dysfunction (Stage I).  Right Heart: Normal right atrium. Normal right ventricular  size and function. Normal tricuspid valve. Minimal  tricuspid regurgitation. Normal pulmonic valve.  Pericardium/Pleura: Normal pericardium with no pericardial  effusion.  Hemodynamic: Estimated right atrial pressure is 8 mm Hg.  Inadequate tricuspid regurgitation Doppler envelope  precludes estimation of RVSP.  ------------------------------------------------------------------------  Conclusions:  1. Normal mitral valve.  2. Normal trileaflet aortic valve. Peak transaortic valve  gradient equals 6 mm Hg, mean transaortic valve gradient  equals 3 mm Hg, aortic valve velocity time integral equals  22 cm. Trace aortic regurgitation.  3. Aortic Root: 2.9 cm.  4. Normal left atrium.  LA volume index = 18 cc/m2.  5. Normal left ventricular internal dimensions and wall  thicknesses.  6. Normal left ventricular systolic function. No segmental  wall motion abnormalities.  7. Mild diastolic dysfunction (Stage I).  8. Normal right ventricular size and function.  9. Inadequate tricuspid regurgitation Doppler envelope  precludes estimation of RVSP.  10. Normal tricuspid valve. Minimal tricuspid  regurgitation.  *** Compared with echocardiogram report of 2/18/2014, no  significant changes noted.  ------------------------------------------------------------------------  Confirmed on  12/7/2018 - 13:49:43 by Shefali Gómez M.D.  ------------------------------------------------------------------------    < end of copied text >  ------------------------------------------------------------------------------------------------  MICROBIOLOGY:     Culture - Sputum . (12.07.18 @ 08:34)    Gram Stain:   Rare polymorphonuclear leukocytes per low power field  Rare Squamous epithelial cells per low power field  Numerous Gram Positive Cocci in Pairs and Chains per oil power field    Specimen Source: .Sputum Sputum    Culture Results:   Normal Respiratory Samaria present    Culture - Sputum . (12.05.18 @ 22:50)    Gram Stain:   Moderate polymorphonuclear leukocytes per low power field  Few Squamous epithelial cells per low power field  Moderate Gram Positive Cocci in Pairs and Chains per oil power field    Specimen Source: .Sputum Sputum    Culture Results:   Normal Respiratory Samaria present    Rapid Respiratory Viral Panel (11.30.18 @ 01:30)    Rapid RVP Result: NotDete: The Film[employer] RVP Rapid uses polymerase chain reaction (PCR) and melt  curve analysis to screen for adenovirus; coronavirus HKU1, NL63, 229E,  OC43; human metapneumovirus (hMPV); human enterovirus/rhinovirus  (Entero/RV); influenza A; influenza A/H1;influenza A/H3; influenza  A/H1-2009; influenza B; parainfluenza viruses 1, 2, 3, 4; respiratory  syncytial virus; Bordetella pertussis; Mycoplasma pneumoniae; and  Chlamydophila pneumoniae.      RADIOLOGY:  [x] Chest radiographs reviewed and interpreted by me    < from: [facility] Duplex Lower Ext Vein Scan, Bilat (12.30.18 @ 19:06) >    EXAM:  DUPLEX SCAN EXT VEINS LOWER BI                          PROCEDURE DATE:  12/30/2018      INTERPRETATION:  Clinical indication: Worsening edema.    Technique:  Grayscale, color Doppler and spectral Doppler ultrasound was   utilized toevaluate bilateral lower extremity deep venous system.      Comparison: 12/6/2018.    Findings: There is no thrombosis in bilateral common femoral veins,   femoral veins or popliteal veins. Visualized calf veins are patent. There   is bilateral lowerextremity soft tissue edema.    Impression:     No evidence of deep vein thrombosis in either lower extremity.    BROCK TOBIN M.D., ATTENDING RADIOLOGIST  This document has been electronically signed. Dec 30 2018  7:24PM     < end of copied text >  ---------------------------------------------------------------------------------------------------------------  < from: [facility] Duplex Upper Ext Vein Scan, Bilat (12.30.18 @ 19:05) >    EXAM:  DUPLEX SCAN EXT VEINS UPPER BI                          PROCEDURE DATE:  12/30/2018      INTERPRETATION:  Clinical information: Worsening bilateral upper   extremity edema.    Findings: Duplex evaluation of the deep venous system of the right and   left upper extremity was performed to include the brachiocephalic,   internal jugular, subclavian, axillary, brachial, radial and ulnar veins.   No echogenic thrombus is seen within the vein lumina. Complete coaptation   of those segments of vein accessible to compression was achieved.   Spontaneous venous flow with respiratory and cardiac pulsatility is noted   throughout.     The right innominate vein is patent. The left innominate vein was not   visualized.     The right and left basilic and cephalic veins are patent and compressible.    Impression: No duplex evidence of DVT in the right and left upper   extremity.    RAYMUNDO DUMONT M.D., ATTENDING RADIOLOGIST  This document has been electronically signed. Dec 31 2018  8:49AM     < end of copied text >  ---------------------------------------------------------------------------------------------------------------  < from: Xray Chest 1 View AP/PA (12.25.18 @ 18:04) >    EXAM:  XR CHEST AP OR PA 1V                          PROCEDURE DATE:  12/25/2018      INTERPRETATION:  CLINICAL INFORMATION: Shortness of breath. History of   COPD.    TECHNIQUE: AP view of the chest 12/25/2018.    COMPARISON: Chest radiograph 12/13/2018.     FINDINGS:     The lungs are clear. There is no pneumothorax and no pleural effusions.   Cardiac size is not accurately evaluated in this projection.  The visualized osseous structures demonstrate no acute abnormality.    IMPRESSION:   Clear lungs.    SUSANA HODGES M.D., RADIOLOGY RESIDENT  This document has been electronically signed.  SVETLANA SANDOVAL M.D., ATTENDING RADIOLOGIST  This document has been electronically signed. Dec 26 2018 10:59AM      < end of copied text >  ---------------------------------------------------------------------------------------------------------------  < from: CT Angio Chest w/ IV Cont (12.04.18 @ 16:30) >    EXAM:  CT ANGIO CHEST (W)AW IC                          PROCEDURE DATE:  12/04/2018      INTERPRETATION:  CT CHEST WITH CONTRAST    INDICATION: Known COPD not responding to steroids and bronchodilators.   Progressively worsening dyspnea. Evaluate for pulmonary embolism.    TECHNIQUE: Enhanced helical images were obtained of the chest. Coronal   and sagittal images were reconstructed. Maximum intensity projection   images were generated. Images were obtained after the uneventful   administration of 60 cc nonionic intravenous Omnipaque 350.  40 cc of   Omnipaque 350 was discarded.    COMPARISON: CT chest 2/18/2014.    FINDINGS:     Lungs And Airways: Emphysematous changes of the lung.      The airways are unremarkable.      Pleura: No pneumothorax. No pleural effusion.    Mediastinum: There are no enlarged chest lymph nodes. The visualized   portion of the thyroid gland is unremarkable.       Heart and Vasculature: No pulmonary embolism.    The main pulmonary artery is normal in caliber. No thoracic aortic   aneurysm or dissection. Atheromatous disease of the aorta.    The heart is normal in size.  There is no pericardial effusion.   Coronary artery disease with calcified plaque involving the right and   left main coronary arteries and the left anterior descending artery.      Upper Abdomen: The upper abdomen is unremarkable.    Bones And Soft Tissues: Degenerative changes of the spine.  The soft   tissues are unremarkable.      IMPRESSION:   1.  No pulmonary embolism.  2. Emphysematous changes of the lung.    DIANA MEDINA M.D., RADIOLOGY RESIDENT  This document has been electronically signed.  JEYSON SANCHEZ M.D., ATTENDING RADIOLOGIST  This document has been electronically signed. Dec  4 2018  5:21PM      < end of copied text >  ---------------------------------------------------------------------------------------------------------------  SPIROMETRY:     FEV1 0.56 liters - 22% predicted  FVC 1.73 liters - 52% predicted  FEV1% 32    c/w very severe obstructive lung disease

## 2019-01-05 DIAGNOSIS — I48.91 UNSPECIFIED ATRIAL FIBRILLATION: ICD-10-CM

## 2019-01-05 LAB
ALBUMIN SERPL ELPH-MCNC: 3.8 G/DL — SIGNIFICANT CHANGE UP (ref 3.3–5)
ALP SERPL-CCNC: 52 U/L — SIGNIFICANT CHANGE UP (ref 40–120)
ALT FLD-CCNC: 44 U/L — SIGNIFICANT CHANGE UP (ref 10–45)
ANION GAP SERPL CALC-SCNC: 12 MMOL/L — SIGNIFICANT CHANGE UP (ref 5–17)
APTT BLD: 28.7 SEC — SIGNIFICANT CHANGE UP (ref 27.5–36.3)
AST SERPL-CCNC: 17 U/L — SIGNIFICANT CHANGE UP (ref 10–40)
BILIRUB SERPL-MCNC: 0.3 MG/DL — SIGNIFICANT CHANGE UP (ref 0.2–1.2)
BUN SERPL-MCNC: 22 MG/DL — SIGNIFICANT CHANGE UP (ref 7–23)
CALCIUM SERPL-MCNC: 9.1 MG/DL — SIGNIFICANT CHANGE UP (ref 8.4–10.5)
CHLORIDE SERPL-SCNC: 89 MMOL/L — LOW (ref 96–108)
CO2 SERPL-SCNC: 36 MMOL/L — HIGH (ref 22–31)
CREAT SERPL-MCNC: 1.12 MG/DL — SIGNIFICANT CHANGE UP (ref 0.5–1.3)
GLUCOSE BLDC GLUCOMTR-MCNC: 144 MG/DL — HIGH (ref 70–99)
GLUCOSE BLDC GLUCOMTR-MCNC: 153 MG/DL — HIGH (ref 70–99)
GLUCOSE BLDC GLUCOMTR-MCNC: 232 MG/DL — HIGH (ref 70–99)
GLUCOSE BLDC GLUCOMTR-MCNC: 234 MG/DL — HIGH (ref 70–99)
GLUCOSE SERPL-MCNC: 230 MG/DL — HIGH (ref 70–99)
HCT VFR BLD CALC: 32.3 % — LOW (ref 34.5–45)
HGB BLD-MCNC: 9.9 G/DL — LOW (ref 11.5–15.5)
INR BLD: 0.92 RATIO — SIGNIFICANT CHANGE UP (ref 0.88–1.16)
LACTATE SERPL-SCNC: 1.3 MMOL/L — SIGNIFICANT CHANGE UP (ref 0.7–2)
MAGNESIUM SERPL-MCNC: 1.9 MG/DL — SIGNIFICANT CHANGE UP (ref 1.6–2.6)
MCHC RBC-ENTMCNC: 28 PG — SIGNIFICANT CHANGE UP (ref 27–34)
MCHC RBC-ENTMCNC: 30.7 GM/DL — LOW (ref 32–36)
MCV RBC AUTO: 91.2 FL — SIGNIFICANT CHANGE UP (ref 80–100)
PHOSPHATE SERPL-MCNC: 3.8 MG/DL — SIGNIFICANT CHANGE UP (ref 2.5–4.5)
PLATELET # BLD AUTO: 290 K/UL — SIGNIFICANT CHANGE UP (ref 150–400)
POTASSIUM SERPL-MCNC: 4.4 MMOL/L — SIGNIFICANT CHANGE UP (ref 3.5–5.3)
POTASSIUM SERPL-SCNC: 4.4 MMOL/L — SIGNIFICANT CHANGE UP (ref 3.5–5.3)
PROT SERPL-MCNC: 6.4 G/DL — SIGNIFICANT CHANGE UP (ref 6–8.3)
PROTHROM AB SERPL-ACNC: 10.5 SEC — SIGNIFICANT CHANGE UP (ref 10–12.9)
RBC # BLD: 3.54 M/UL — LOW (ref 3.8–5.2)
RBC # FLD: 15 % — HIGH (ref 10.3–14.5)
SODIUM SERPL-SCNC: 137 MMOL/L — SIGNIFICANT CHANGE UP (ref 135–145)
TROPONIN T, HIGH SENSITIVITY RESULT: 38 NG/L — SIGNIFICANT CHANGE UP (ref 0–51)
WBC # BLD: 5.65 K/UL — SIGNIFICANT CHANGE UP (ref 3.8–10.5)
WBC # FLD AUTO: 5.65 K/UL — SIGNIFICANT CHANGE UP (ref 3.8–10.5)

## 2019-01-05 PROCEDURE — 99233 SBSQ HOSP IP/OBS HIGH 50: CPT

## 2019-01-05 PROCEDURE — 93010 ELECTROCARDIOGRAM REPORT: CPT | Mod: 76

## 2019-01-05 PROCEDURE — 93010 ELECTROCARDIOGRAM REPORT: CPT

## 2019-01-05 PROCEDURE — 71045 X-RAY EXAM CHEST 1 VIEW: CPT | Mod: 26

## 2019-01-05 RX ORDER — ENOXAPARIN SODIUM 100 MG/ML
90 INJECTION SUBCUTANEOUS EVERY 12 HOURS
Qty: 0 | Refills: 0 | Status: DISCONTINUED | OUTPATIENT
Start: 2019-01-05 | End: 2019-01-05

## 2019-01-05 RX ORDER — AMIODARONE HYDROCHLORIDE 400 MG/1
400 TABLET ORAL EVERY 8 HOURS
Qty: 0 | Refills: 0 | Status: DISCONTINUED | OUTPATIENT
Start: 2019-01-05 | End: 2019-01-05

## 2019-01-05 RX ORDER — METOPROLOL TARTRATE 50 MG
5 TABLET ORAL EVERY 6 HOURS
Qty: 0 | Refills: 0 | Status: DISCONTINUED | OUTPATIENT
Start: 2019-01-05 | End: 2019-01-05

## 2019-01-05 RX ORDER — METOPROLOL TARTRATE 50 MG
5 TABLET ORAL ONCE
Qty: 0 | Refills: 0 | Status: DISCONTINUED | OUTPATIENT
Start: 2019-01-05 | End: 2019-01-05

## 2019-01-05 RX ORDER — APIXABAN 2.5 MG/1
5 TABLET, FILM COATED ORAL EVERY 12 HOURS
Qty: 0 | Refills: 0 | Status: DISCONTINUED | OUTPATIENT
Start: 2019-01-06 | End: 2019-01-15

## 2019-01-05 RX ORDER — METOPROLOL TARTRATE 50 MG
25 TABLET ORAL ONCE
Qty: 0 | Refills: 0 | Status: COMPLETED | OUTPATIENT
Start: 2019-01-05 | End: 2019-01-05

## 2019-01-05 RX ORDER — DIGOXIN 250 MCG
0.25 TABLET ORAL ONCE
Qty: 0 | Refills: 0 | Status: COMPLETED | OUTPATIENT
Start: 2019-01-05 | End: 2019-01-06

## 2019-01-05 RX ORDER — AMIODARONE HYDROCHLORIDE 400 MG/1
150 TABLET ORAL ONCE
Qty: 0 | Refills: 0 | Status: COMPLETED | OUTPATIENT
Start: 2019-01-05 | End: 2019-01-05

## 2019-01-05 RX ORDER — DIGOXIN 250 MCG
0.5 TABLET ORAL ONCE
Qty: 0 | Refills: 0 | Status: COMPLETED | OUTPATIENT
Start: 2019-01-05 | End: 2019-01-05

## 2019-01-05 RX ORDER — DILTIAZEM HCL 120 MG
5 CAPSULE, EXT RELEASE 24 HR ORAL
Qty: 125 | Refills: 0 | Status: DISCONTINUED | OUTPATIENT
Start: 2019-01-05 | End: 2019-01-06

## 2019-01-05 RX ORDER — DIGOXIN 250 MCG
0.12 TABLET ORAL DAILY
Qty: 0 | Refills: 0 | Status: DISCONTINUED | OUTPATIENT
Start: 2019-01-05 | End: 2019-01-07

## 2019-01-05 RX ADMIN — Medication 3 MILLILITER(S): at 06:14

## 2019-01-05 RX ADMIN — Medication 0.5 MILLIGRAM(S): at 06:14

## 2019-01-05 RX ADMIN — ENOXAPARIN SODIUM 90 MILLIGRAM(S): 100 INJECTION SUBCUTANEOUS at 15:39

## 2019-01-05 RX ADMIN — Medication 3 MILLILITER(S): at 09:39

## 2019-01-05 RX ADMIN — Medication 1 DROP(S): at 13:26

## 2019-01-05 RX ADMIN — Medication 1 TABLET(S): at 11:25

## 2019-01-05 RX ADMIN — Medication 100 MILLIGRAM(S): at 13:25

## 2019-01-05 RX ADMIN — Medication 1 MILLIGRAM(S): at 22:26

## 2019-01-05 RX ADMIN — PANTOPRAZOLE SODIUM 40 MILLIGRAM(S): 20 TABLET, DELAYED RELEASE ORAL at 06:16

## 2019-01-05 RX ADMIN — AMIODARONE HYDROCHLORIDE 300 MILLIGRAM(S): 400 TABLET ORAL at 16:19

## 2019-01-05 RX ADMIN — Medication 3 MILLILITER(S): at 18:13

## 2019-01-05 RX ADMIN — Medication 5 MG/HR: at 20:19

## 2019-01-05 RX ADMIN — AZITHROMYCIN 250 MILLIGRAM(S): 500 TABLET, FILM COATED ORAL at 09:36

## 2019-01-05 RX ADMIN — SENNA PLUS 2 TABLET(S): 8.6 TABLET ORAL at 22:18

## 2019-01-05 RX ADMIN — Medication 3 UNIT(S): at 18:09

## 2019-01-05 RX ADMIN — Medication 25 MILLIGRAM(S): at 13:25

## 2019-01-05 RX ADMIN — INSULIN GLARGINE 8 UNIT(S): 100 INJECTION, SOLUTION SUBCUTANEOUS at 22:12

## 2019-01-05 RX ADMIN — Medication 4 UNIT(S): at 08:51

## 2019-01-05 RX ADMIN — Medication 2: at 13:27

## 2019-01-05 RX ADMIN — ISOSORBIDE MONONITRATE 30 MILLIGRAM(S): 60 TABLET, EXTENDED RELEASE ORAL at 11:25

## 2019-01-05 RX ADMIN — Medication 100 MILLIGRAM(S): at 06:15

## 2019-01-05 RX ADMIN — Medication 81 MILLIGRAM(S): at 11:25

## 2019-01-05 RX ADMIN — Medication 2: at 08:51

## 2019-01-05 RX ADMIN — Medication 100 MILLIGRAM(S): at 22:18

## 2019-01-05 RX ADMIN — Medication 0.5 MILLIGRAM(S): at 18:28

## 2019-01-05 RX ADMIN — Medication 1 DROP(S): at 22:18

## 2019-01-05 RX ADMIN — Medication 1 DROP(S): at 06:14

## 2019-01-05 RX ADMIN — Medication 500000 UNIT(S): at 11:25

## 2019-01-05 RX ADMIN — Medication 5 MILLIGRAM(S): at 15:19

## 2019-01-05 RX ADMIN — Medication 400 MILLIGRAM(S): at 08:48

## 2019-01-05 RX ADMIN — Medication 2000 UNIT(S): at 11:25

## 2019-01-05 RX ADMIN — Medication 3 MILLILITER(S): at 22:21

## 2019-01-05 RX ADMIN — MONTELUKAST 10 MILLIGRAM(S): 4 TABLET, CHEWABLE ORAL at 11:26

## 2019-01-05 RX ADMIN — Medication 1: at 18:08

## 2019-01-05 RX ADMIN — Medication 40 MILLIGRAM(S): at 06:15

## 2019-01-05 RX ADMIN — Medication 0.5 MILLIGRAM(S): at 22:28

## 2019-01-05 RX ADMIN — Medication 15 MILLIGRAM(S): at 06:15

## 2019-01-05 NOTE — PROVIDER CONTACT NOTE (CHANGE IN STATUS NOTIFICATION) - SITUATION
Pt sitting in chair, complaining of chest pain/ weird feeling in chest that she has never had before

## 2019-01-05 NOTE — PROGRESS NOTE ADULT - PROBLEM SELECTOR PLAN 2
Prednisone taper per pulm   Continue Pulmicort, Duoneb ATC   c/w Theophylline, Singulair.  Completed 7 days of biaxin   Home Trilogy vent arranged by pulmonary.  c/w intermittent bipap ; alternating with 5L NC  C/w daliresp  cw azithromycin   Plan for discharge to acute pulmonary rehab  Appreciate pm&r recs  ENT evaluated, pt s/p laryngoscope wnl as part of preopr workup prior to transplant   Pulm coordinating lung transplant at [place]

## 2019-01-05 NOTE — PROGRESS NOTE ADULT - SUBJECTIVE AND OBJECTIVE BOX
CARDIOLOGY FOLLOW UP - Dr. Brunson    CC no cp/sob   blisters noted on b/l le legs     PHYSICAL EXAM:  T(C): 36.2 (01-05-19 @ 08:50), Max: 36.8 (01-04-19 @ 13:09)  HR: 78 (01-05-19 @ 08:50) (78 - 98)  BP: 130/79 (01-05-19 @ 08:50) (130/79 - 149/91)  RR: 15 (01-05-19 @ 08:50) (15 - 20)  SpO2: 100% (01-05-19 @ 08:50) (99% - 100%)  Wt(kg): --  I&O's Summary    04 Jan 2019 07:01  -  05 Jan 2019 07:00  --------------------------------------------------------  IN: 490 mL / OUT: 300 mL / NET: 190 mL        Appearance: Normal	  Cardiovascular: Normal S1 S2,RRR, No JVD, No murmurs  Respiratory: diminished   Gastrointestinal:  Soft, Non-tender, + BS	  Extremities: Normal range of motion, No clubbing, b/l LE edema , +blisters noted         MEDICATIONS  (STANDING):  ALBUTerol/ipratropium for Nebulization 3 milliLiter(s) Nebulizer <User Schedule>  artificial tears (preservative free) Ophthalmic Solution 1 Drop(s) Both EYES three times a day  aspirin enteric coated 81 milliGRAM(s) Oral daily  azithromycin   Tablet 250 milliGRAM(s) Oral <User Schedule>  Biotene Dry Mouth Oral Rinse 5 milliLiter(s) Swish and Spit two times a day  buDESOnide   0.5 milliGRAM(s) Respule 0.5 milliGRAM(s) Inhalation every 12 hours  cholecalciferol 2000 Unit(s) Oral daily  dextrose 5%. 1000 milliLiter(s) (50 mL/Hr) IV Continuous <Continuous>  dextrose 50% Injectable 12.5 Gram(s) IV Push once  dextrose 50% Injectable 25 Gram(s) IV Push once  dextrose 50% Injectable 25 Gram(s) IV Push once  docusate sodium 100 milliGRAM(s) Oral three times a day  enoxaparin Injectable 40 milliGRAM(s) SubCutaneous every 24 hours  furosemide    Tablet 40 milliGRAM(s) Oral daily  insulin glargine Injectable (LANTUS) 8 Unit(s) SubCutaneous at bedtime  insulin lispro (HumaLOG) corrective regimen sliding scale   SubCutaneous three times a day before meals  insulin lispro (HumaLOG) corrective regimen sliding scale   SubCutaneous at bedtime  insulin lispro Injectable (HumaLOG) 4 Unit(s) SubCutaneous before breakfast  insulin lispro Injectable (HumaLOG) 3 Unit(s) SubCutaneous with dinner  insulin lispro Injectable (HumaLOG) 4 Unit(s) SubCutaneous before lunch  isosorbide   mononitrate ER Tablet (IMDUR) 30 milliGRAM(s) Oral daily  melatonin 1 milliGRAM(s) Oral at bedtime  montelukast 10 milliGRAM(s) Oral daily  multivitamin 1 Tablet(s) Oral daily  nystatin    Suspension [id number] Unit(s) Oral four times a day  pantoprazole    Tablet 40 milliGRAM(s) Oral before breakfast  polyethylene glycol 3350 17 Gram(s) Oral daily  predniSONE   Tablet 15 milliGRAM(s) Oral daily  predniSONE   Tablet   Oral   senna 2 Tablet(s) Oral at bedtime  simethicone 80 milliGRAM(s) Chew once  theophylline ER Capsule 400 milliGRAM(s) Oral daily      TELEMETRY: 	    ECG:  	  RADIOLOGY:   DIAGNOSTIC TESTING:  [ ] Echocardiogram:  [ ]  Catheterization:  [ ] Stress Test:    OTHER: 	    LABS:

## 2019-01-05 NOTE — PROGRESS NOTE ADULT - SUBJECTIVE AND OBJECTIVE BOX
Patient is a 58y old Female who presents with a chief complaint of dyspnea (05 Jan 2019 09:01)      SUBJECTIVE / OVERNIGHT EVENTS: Patient seen and examined at bedside - patient was doing well this morning, RRT called early afternoon, patient c/o chest pain found with Afib with RVR rates in 170s, patient received cardizem 5mg IV x2, lopressor 5mg x1, rate went back down to low 100s, started PO lopressor 25mg bid. Chest pain resolved after 1 minute. Currently patient is asymptomatic, says she has no CP, no SOB    ROS:  As above. All other review of systems negative    Allergies    No Known Allergies    Intolerances        MEDICATIONS  (STANDING):  ALBUTerol/ipratropium for Nebulization 3 milliLiter(s) Nebulizer <User Schedule>  artificial tears (preservative free) Ophthalmic Solution 1 Drop(s) Both EYES three times a day  aspirin enteric coated 81 milliGRAM(s) Oral daily  azithromycin   Tablet 250 milliGRAM(s) Oral <User Schedule>  Biotene Dry Mouth Oral Rinse 5 milliLiter(s) Swish and Spit two times a day  buDESOnide   0.5 milliGRAM(s) Respule 0.5 milliGRAM(s) Inhalation every 12 hours  cholecalciferol 2000 Unit(s) Oral daily  dextrose 5%. 1000 milliLiter(s) (50 mL/Hr) IV Continuous <Continuous>  dextrose 50% Injectable 12.5 Gram(s) IV Push once  dextrose 50% Injectable 25 Gram(s) IV Push once  dextrose 50% Injectable 25 Gram(s) IV Push once  docusate sodium 100 milliGRAM(s) Oral three times a day  enoxaparin Injectable 40 milliGRAM(s) SubCutaneous every 24 hours  furosemide    Tablet 40 milliGRAM(s) Oral daily  insulin glargine Injectable (LANTUS) 8 Unit(s) SubCutaneous at bedtime  insulin lispro (HumaLOG) corrective regimen sliding scale   SubCutaneous three times a day before meals  insulin lispro (HumaLOG) corrective regimen sliding scale   SubCutaneous at bedtime  insulin lispro Injectable (HumaLOG) 4 Unit(s) SubCutaneous before breakfast  insulin lispro Injectable (HumaLOG) 3 Unit(s) SubCutaneous with dinner  insulin lispro Injectable (HumaLOG) 4 Unit(s) SubCutaneous before lunch  isosorbide   mononitrate ER Tablet (IMDUR) 30 milliGRAM(s) Oral daily  melatonin 1 milliGRAM(s) Oral at bedtime  montelukast 10 milliGRAM(s) Oral daily  multivitamin 1 Tablet(s) Oral daily  nystatin    Suspension 211957 Unit(s) Oral four times a day  pantoprazole    Tablet 40 milliGRAM(s) Oral before breakfast  polyethylene glycol 3350 17 Gram(s) Oral daily  predniSONE   Tablet 15 milliGRAM(s) Oral daily  predniSONE   Tablet   Oral   senna 2 Tablet(s) Oral at bedtime  simethicone 80 milliGRAM(s) Chew once  theophylline ER Capsule 400 milliGRAM(s) Oral daily    MEDICATIONS  (PRN):  aluminum hydroxide/magnesium hydroxide/simethicone Suspension 30 milliLiter(s) Oral every 6 hours PRN Dyspepsia  bisacodyl Suppository 10 milliGRAM(s) Rectal daily PRN Constipation  dextrose 40% Gel 15 Gram(s) Oral once PRN Blood Glucose LESS THAN 70 milliGRAM(s)/deciliter  glucagon  Injectable 1 milliGRAM(s) IntraMuscular once PRN Glucose LESS THAN 70 milligrams/deciliter  ondansetron Injectable 4 milliGRAM(s) IV Push every 6 hours PRN Nausea and/or Vomiting  petrolatum Ophthalmic Ointment 1 Application(s) Both EYES at bedtime PRN dry eyes  sodium chloride 0.65% Nasal 1 Spray(s) Both Nostrils two times a day PRN Nasal Congestion      Vital Signs Last 24 Hrs  T(C): 36.5 (05 Jan 2019 12:05), Max: 36.6 (05 Jan 2019 00:02)  T(F): 97.7 (05 Jan 2019 12:05), Max: 97.9 (05 Jan 2019 00:02)  HR: 125 (05 Jan 2019 12:05) (78 - 125)  BP: 132/79 (05 Jan 2019 12:05) (130/79 - 148/90)  BP(mean): --  RR: 20 (05 Jan 2019 12:05) (15 - 20)  SpO2: 100% (05 Jan 2019 12:05) (99% - 100%)  CAPILLARY BLOOD GLUCOSE      POCT Blood Glucose.: 232 mg/dL (05 Jan 2019 12:44)  POCT Blood Glucose.: 234 mg/dL (05 Jan 2019 08:48)  POCT Blood Glucose.: 187 mg/dL (04 Jan 2019 22:08)  POCT Blood Glucose.: 256 mg/dL (04 Jan 2019 17:44)    I&O's Summary    04 Jan 2019 07:01  -  05 Jan 2019 07:00  --------------------------------------------------------  IN: 490 mL / OUT: 300 mL / NET: 190 mL    05 Jan 2019 07:01  -  05 Jan 2019 13:38  --------------------------------------------------------  IN: 240 mL / OUT: 0 mL / NET: 240 mL        PHYSICAL EXAM:  GENERAL: NAD, well-developed  HEAD:  Atraumatic, Normocephalic  EYES: conjunctiva and sclera clear  NECK: No JVD  CHEST/LUNG: bronchial breath sounds pt on bipap   HEART: Regular rate and rhythm; S1S2  ABDOMEN: Soft, Nontender, Nondistended; Bowel sounds present  EXTREMITIES:  +1 UE edema, +2 LE edema , ace wrap   PSYCH: AAOx3  NEUROLOGY: non-focal    LABS:          PT/INR - ( 05 Jan 2019 13:07 )   PT: 10.5 sec;   INR: 0.92 ratio         PTT - ( 05 Jan 2019 13:07 )  PTT:28.7 sec          Consultant(s) Notes Reviewed:  pulm, cards, endocrine    Care Discussed with Consultants/Other Providers: Dr. Brunson regarding afib - will start lopressor 25mg bid PO, AC for now with short acting agent. May need cardiac work up    Case Discussed with Family: discussed at length with brother at bedside

## 2019-01-05 NOTE — CHART NOTE - NSCHARTNOTEFT_GEN_A_CORE
HR rate of 145-150 SVT  No c/o headache, dizziness, sob, chest pain .   given Diltiazem ivp x 1, po now and Q6, Eliquis around 6PM  As Per EP consult. d/c po cardizem and start Cardizem drip at 5mg/h at this time   - spoke to cardiology fellow  - c/w tele monitoring. HR rate of 145-150 SVT  No c/o headache, dizziness, sob, chest pain .   given Diltiazem ivp x 1, po now and Q6, Eliquis around 6PM  As Per EP consult. d/c po cardizem and start Cardizem drip at 5mg/h at this time   - spoke to cardiology fellow  - c/w tele monitoring.      addendum     Cardizem gtt titrate up to 15mg /h  still HR of 150's  notified cardio fellow again and wait for recommendation. HR rate of 145-150 SVT  No c/o headache, dizziness, sob, chest pain .   given Diltiazem ivp x 1, po now and Q6, Eliquis around 6PM  As Per EP consult. d/c po cardizem and start Cardizem drip at 5mg/h at this time   - spoke to cardiology fellow  - c/w tele monitoring.      addendum     Cardizem gtt titrate up to 15mg /h  still HR of 150's  notified cardio fellow   AS per cardilogy, start digoxin loading( 0.5mg IVP x1 and 0.25 mg IVP x 2) 6 hours apart.  IF HR is not responding to Digoxin possible, cardioversion on Monday.  spoke to Dr Riggs(covering hospitalist).

## 2019-01-05 NOTE — CONSULT NOTE ADULT - ASSESSMENT
Ms Vega is a 57 yo F with a PMH significant for COPD on home O2 3L, HTN, HLD, CAD s/p x6 stents?, DM, pHTN, PVD, who was admitted on 11/29/18 for progressive worsening dyspnea. RRT called this pm for AF with RVR, s/p multiple IVP of diltiazem and metoprolol, now s/p amio 150mg IVP x 1.    #Atrial fibrillation  Likely exacerbated by lung condition.  - Ms Vega is a 57 yo F with a PMH significant for COPD on home O2 3L, HTN, HLD, CAD s/p x6 stents?, DM, pHTN, PVD, who was admitted on 11/29/18 for progressive worsening dyspnea. RRT called this pm for AF with RVR, s/p multiple IVP of diltiazem and metoprolol, now s/p amio 150mg IVP x 1.    #Atrial fibrillation  Likely exacerbated by lung condition. CHADSVASC 4. TTE with normal LV systolic function in Dec 2018.  - dilt 10 IVP x 1 now, dilt 60mg PO x 1 now, then 60mg q6h, can uptitrate with goal HR < 110  - if patient's heart rate is not improved, can consider dilt gtt @ 5/hr  - discontinue amiodarone and beta blocker given pulmonary status  - d/c lovenox, start Eliquis 5mg PO BID

## 2019-01-05 NOTE — CONSULT NOTE ADULT - SUBJECTIVE AND OBJECTIVE BOX
Patient seen and evaluated at bedside    Chief Complaint: chest pressure, tachycardia    HPI:  Ms Vega is a 57 yo F with a PMH significant for COPD on home O2 3L, HTN, HLD, CAD s/p x6 stents?, DM, pHTN, PVD, who was admitted on 11/29/18 for progressive worsening dyspnea. While in the hospital, she has been treated for COPD exacerbation and chronic hypoxic/hypercarbic respiratory failure.      EP consulted due to recent RRT for tachycardia. Patient had an RRT called for AFl with RVR. She complained of chest tightness and a "weird" sensation at that time which resolved after about a minute. She received diltiazem 5mg IVP x2, diltiazem 10mg IVP x 1, lopressor 5mg IV x 1 with improvement in heart rate from 160s-100s. Patient also received amiodarone 150mg IVP at 4pm and was started on metoprolol tartrate 25mg BID.    Currently, patient is slightly SOB though at her baseline, without chest discomfort and without palpitations. HR 140s.        PMHx:   Hyperlipemia  Chronic sinusitis  Raynaud phenomenon  HTN (hypertension)  DM (diabetes mellitus)  Claustrophobia  COPD (chronic obstructive pulmonary disease)      PSHx:   S/P tonsillectomy      Allergies:  No Known Allergies      Home Meds:    Current Medications:   ALBUTerol/ipratropium for Nebulization 3 milliLiter(s) Nebulizer <User Schedule>  aluminum hydroxide/magnesium hydroxide/simethicone Suspension 30 milliLiter(s) Oral every 6 hours PRN  amiodarone    Tablet 400 milliGRAM(s) Oral every 8 hours  artificial tears (preservative free) Ophthalmic Solution 1 Drop(s) Both EYES three times a day  aspirin enteric coated 81 milliGRAM(s) Oral daily  azithromycin   Tablet 250 milliGRAM(s) Oral <User Schedule>  Biotene Dry Mouth Oral Rinse 5 milliLiter(s) Swish and Spit two times a day  bisacodyl Suppository 10 milliGRAM(s) Rectal daily PRN  buDESOnide   0.5 milliGRAM(s) Respule 0.5 milliGRAM(s) Inhalation every 12 hours  cholecalciferol 2000 Unit(s) Oral daily  dextrose 40% Gel 15 Gram(s) Oral once PRN  dextrose 5%. 1000 milliLiter(s) IV Continuous <Continuous>  dextrose 50% Injectable 12.5 Gram(s) IV Push once  dextrose 50% Injectable 25 Gram(s) IV Push once  dextrose 50% Injectable 25 Gram(s) IV Push once  docusate sodium 100 milliGRAM(s) Oral three times a day  enoxaparin Injectable 90 milliGRAM(s) SubCutaneous every 12 hours  furosemide    Tablet 40 milliGRAM(s) Oral daily  glucagon  Injectable 1 milliGRAM(s) IntraMuscular once PRN  insulin glargine Injectable (LANTUS) 8 Unit(s) SubCutaneous at bedtime  insulin lispro (HumaLOG) corrective regimen sliding scale   SubCutaneous three times a day before meals  insulin lispro (HumaLOG) corrective regimen sliding scale   SubCutaneous at bedtime  insulin lispro Injectable (HumaLOG) 4 Unit(s) SubCutaneous before breakfast  insulin lispro Injectable (HumaLOG) 3 Unit(s) SubCutaneous with dinner  insulin lispro Injectable (HumaLOG) 4 Unit(s) SubCutaneous before lunch  isosorbide   mononitrate ER Tablet (IMDUR) 30 milliGRAM(s) Oral daily  melatonin 1 milliGRAM(s) Oral at bedtime  metoprolol tartrate Injectable 5 milliGRAM(s) IV Push every 6 hours  montelukast 10 milliGRAM(s) Oral daily  multivitamin 1 Tablet(s) Oral daily  nystatin    Suspension 258826 Unit(s) Oral four times a day  ondansetron Injectable 4 milliGRAM(s) IV Push every 6 hours PRN  pantoprazole    Tablet 40 milliGRAM(s) Oral before breakfast  petrolatum Ophthalmic Ointment 1 Application(s) Both EYES at bedtime PRN  polyethylene glycol 3350 17 Gram(s) Oral daily  predniSONE   Tablet 15 milliGRAM(s) Oral daily  predniSONE   Tablet   Oral   senna 2 Tablet(s) Oral at bedtime  simethicone 80 milliGRAM(s) Chew once  sodium chloride 0.65% Nasal 1 Spray(s) Both Nostrils two times a day PRN  theophylline ER Capsule 400 milliGRAM(s) Oral daily      FAMILY HISTORY:  FH: MI (myocardial infarction) (Father)  FH: CABG (coronary artery bypass surgery) (Father)      Social History:  Smoking History:  Alcohol Use:  Drug Use:    REVIEW OF SYSTEMS:  CONSTITUTIONAL: No weakness, fevers or chills  EYES/ENT: No visual changes;  No dysphagia  NECK: No pain or stiffness  RESPIRATORY: No cough, wheezing, hemoptysis; No shortness of breath  CARDIOVASCULAR: No chest pain or palpitations; No lower extremity edema  GASTROINTESTINAL: No abdominal or epigastric pain. No nausea, vomiting, or hematemesis; No diarrhea or constipation. No melena or hematochezia.  BACK: No back pain  GENITOURINARY: No dysuria, frequency or hematuria  NEUROLOGICAL: No numbness or weakness  SKIN: No itching, burning, rashes, or lesions   All other review of systems is negative unless indicated above.    Physical Exam:  T(F): 97.7 (01-05), Max: 97.9 (01-05)  HR: 140 (01-05) (78 - 140)  BP: 110/70 (01-05) (110/70 - 148/90)  RR: 20 (01-05)  SpO2: 100% (01-05)  GENERAL: No acute distress, well-developed  HEAD:  Atraumatic, Normocephalic  ENT: EOMI, PERRLA, conjunctiva and sclera clear, Neck supple, No JVD, moist mucosa  CHEST/LUNG: Clear to auscultation bilaterally; No wheeze, equal breath sounds bilaterally   BACK: No spinal tenderness  HEART: Regular rate and rhythm; No murmurs, rubs, or gallops  ABDOMEN: Soft, Nontender, Nondistended; Bowel sounds present  EXTREMITIES:  No clubbing, cyanosis, or edema  PSYCH: Nl behavior, nl affect  NEUROLOGY: AAOx3, non-focal, cranial nerves intact  SKIN: Normal color, No rashes or lesions    Cardiovascular Diagnostic Testing:    ECG: Personally reviewed:    Echo: Personally reviewed:  12/7/18  Conclusions:  1. Normal mitral valve.  2. Normal trileaflet aortic valve. Peak transaortic valve gradient equals 6 mm Hg, mean transaortic valve gradient equals 3 mm Hg, aortic valve velocity time integral equals 22 cm. Trace aortic regurgitation.  3. Aortic Root: 2.9 cm.  4. Normal left atrium.  LA volume index = 18 cc/m2.  5. Normal left ventricular internal dimensions and wall thicknesses.  6. Normal left ventricular systolic function. No segmental wall motion abnormalities.  7. Mild diastolic dysfunction (Stage I).  8. Normal right ventricular size and function.  9. Inadequate tricuspid regurgitation Doppler envelope precludes estimation of RVSP.  10. Normal tricuspid valve. Minimal tricuspid regurgitation.  *** Compared with echocardiogram report of 2/18/2014, no significant changes noted.    Cath:  3/24/17  CORONARY VESSELS: The coronary circulation is right dominant.  LM:   --  Mid left main: There was a 30 % stenosis.  LAD:   --  Ostial LAD: There was a 40 % stenosis.  CX:   --  Circumflex: Normal.  RCA:   --  RCA: Normal.  COMPLICATIONS: There were no complications.  DIAGNOSTIC RECOMMENDATIONS: The patient should continue with the present medications.    Imaging:    CXR: Personally reviewed  12/25/18     Labs: Personally reviewed                        9.9    5.65  )-----------( 290      ( 05 Jan 2019 15:01 )             32.3     01-05    137  |  89<L>  |  22  ----------------------------<  230<H>  4.4   |  36<H>  |  1.12    Ca    9.1      05 Jan 2019 13:07  Phos  3.8     01-05  Mg     1.9     01-05    TPro  6.4  /  Alb  3.8  /  TBili  0.3  /  DBili  x   /  AST  17  /  ALT  44  /  AlkPhos  52  01-05    PT/INR - ( 05 Jan 2019 13:07 )   PT: 10.5 sec;   INR: 0.92 ratio     PTT - ( 05 Jan 2019 13:07 )  PTT:28.7 sec    CARDIAC MARKERS ( 05 Jan 2019 13:07 )  38 ng/L / x     / x     / x     / x     / x Patient seen and evaluated at bedside    Chief Complaint: chest pressure, tachycardia    HPI:  Ms Vega is a 59 yo F with a PMH significant for COPD on home O2 3L, HTN, HLD, CAD s/p x6 stents?, DM, pHTN, PVD, who was admitted on 11/29/18 for progressive worsening dyspnea. While in the hospital, she has been treated for COPD exacerbation and chronic hypoxic/hypercarbic respiratory failure.      EP consulted due to recent RRT for tachycardia. Patient had an RRT called for AFl with RVR. She complained of chest tightness and a "weird" sensation at that time which resolved after about a minute. She received diltiazem 5mg IVP x2, diltiazem 10mg IVP x 1, lopressor 5mg IV x 1 with improvement in heart rate from 160s-100s. Patient also received amiodarone 150mg IVP at 4pm and was started on metoprolol tartrate 25mg BID.    Currently, patient is slightly SOB though at her baseline, without chest discomfort and without palpitations. HR 140s. She is very uncomfortable due to moving into a warmer room and is not completely cooperative with exam.        PMHx:   Hyperlipemia  Chronic sinusitis  Raynaud phenomenon  HTN (hypertension)  DM (diabetes mellitus)  Claustrophobia  COPD (chronic obstructive pulmonary disease)      PSHx:   S/P tonsillectomy    Allergies:  No Known Allergies    Current Medications:   ALBUTerol/ipratropium for Nebulization 3 milliLiter(s) Nebulizer <User Schedule>  aluminum hydroxide/magnesium hydroxide/simethicone Suspension 30 milliLiter(s) Oral every 6 hours PRN  amiodarone    Tablet 400 milliGRAM(s) Oral every 8 hours  artificial tears (preservative free) Ophthalmic Solution 1 Drop(s) Both EYES three times a day  aspirin enteric coated 81 milliGRAM(s) Oral daily  azithromycin   Tablet 250 milliGRAM(s) Oral <User Schedule>  Biotene Dry Mouth Oral Rinse 5 milliLiter(s) Swish and Spit two times a day  bisacodyl Suppository 10 milliGRAM(s) Rectal daily PRN  buDESOnide   0.5 milliGRAM(s) Respule 0.5 milliGRAM(s) Inhalation every 12 hours  cholecalciferol 2000 Unit(s) Oral daily  dextrose 40% Gel 15 Gram(s) Oral once PRN  dextrose 5%. 1000 milliLiter(s) IV Continuous <Continuous>  dextrose 50% Injectable 12.5 Gram(s) IV Push once  dextrose 50% Injectable 25 Gram(s) IV Push once  dextrose 50% Injectable 25 Gram(s) IV Push once  docusate sodium 100 milliGRAM(s) Oral three times a day  enoxaparin Injectable 90 milliGRAM(s) SubCutaneous every 12 hours  furosemide    Tablet 40 milliGRAM(s) Oral daily  glucagon  Injectable 1 milliGRAM(s) IntraMuscular once PRN  insulin glargine Injectable (LANTUS) 8 Unit(s) SubCutaneous at bedtime  insulin lispro (HumaLOG) corrective regimen sliding scale   SubCutaneous three times a day before meals  insulin lispro (HumaLOG) corrective regimen sliding scale   SubCutaneous at bedtime  insulin lispro Injectable (HumaLOG) 4 Unit(s) SubCutaneous before breakfast  insulin lispro Injectable (HumaLOG) 3 Unit(s) SubCutaneous with dinner  insulin lispro Injectable (HumaLOG) 4 Unit(s) SubCutaneous before lunch  isosorbide   mononitrate ER Tablet (IMDUR) 30 milliGRAM(s) Oral daily  melatonin 1 milliGRAM(s) Oral at bedtime  metoprolol tartrate Injectable 5 milliGRAM(s) IV Push every 6 hours  montelukast 10 milliGRAM(s) Oral daily  multivitamin 1 Tablet(s) Oral daily  nystatin    Suspension [id number] Unit(s) Oral four times a day  ondansetron Injectable 4 milliGRAM(s) IV Push every 6 hours PRN  pantoprazole    Tablet 40 milliGRAM(s) Oral before breakfast  petrolatum Ophthalmic Ointment 1 Application(s) Both EYES at bedtime PRN  polyethylene glycol 3350 17 Gram(s) Oral daily  predniSONE   Tablet 15 milliGRAM(s) Oral daily  predniSONE   Tablet   Oral   senna 2 Tablet(s) Oral at bedtime  simethicone 80 milliGRAM(s) Chew once  sodium chloride 0.65% Nasal 1 Spray(s) Both Nostrils two times a day PRN  theophylline ER Capsule 400 milliGRAM(s) Oral daily      FAMILY HISTORY:  FH: MI (myocardial infarction) (Father)  FH: CABG (coronary artery bypass surgery) (Father)      Social History:  Smoking History: 30 ppd, quit 3 years ago  Alcohol Use: social  Drug Use: denies    REVIEW OF SYSTEMS:  CONSTITUTIONAL: No weakness, fevers or chills  EYES/ENT: No visual changes;  No dysphagia  NECK: No pain or stiffness  RESPIRATORY: + SOB, wheezing, No cough, hemoptysis; No shortness of breath  CARDIOVASCULAR: + chest pressure, SOB, No palpitations; No lower extremity edema  GASTROINTESTINAL: No abdominal or epigastric pain. No nausea, vomiting, or hematemesis; No diarrhea or constipation. No melena or hematochezia.  GENITOURINARY: No dysuria, frequency or hematuria  NEUROLOGICAL: No numbness or weakness  SKIN: No itching, burning, rashes, or lesions   All other review of systems is negative unless indicated above.    Physical Exam:  T(F): 97.7 (01-05), Max: 97.9 (01-05)  HR: 140 (01-05) (78 - 140)  BP: 110/70 (01-05) (110/70 - 148/90)  RR: 20 (01-05)  SpO2: 100% (01-05)  GENERAL: No acute distress, well-developed  HEAD:  Atraumatic, Normocephalic  ENT: EOMI, PERRLA, conjunctiva and sclera clear, Neck supple, No JVD, moist mucosa  CHEST/LUNG: diffuse exp wheezing   HEART: tachycardic, regular rate and rhythm; No murmurs, rubs, or gallops  ABDOMEN: Soft, obese, Nontender, Nondistended; Bowel sounds present  EXTREMITIES:  No clubbing, cyanosis, or edema  PSYCH: Nl behavior, nl affect  NEUROLOGY: AAOx3, non-focal, cranial nerves intact  SKIN: Normal color, No rashes or lesions    Cardiovascular Diagnostic Testing:    ECG: Personally reviewed:  , RBBB  sinus tachycardia, 106, RBBB    Echo: Personally reviewed:  12/7/18  Conclusions:  1. Normal mitral valve.  2. Normal trileaflet aortic valve. Peak transaortic valve gradient equals 6 mm Hg, mean transaortic valve gradient equals 3 mm Hg, aortic valve velocity time integral equals 22 cm. Trace aortic regurgitation.  3. Aortic Root: 2.9 cm.  4. Normal left atrium.  LA volume index = 18 cc/m2.  5. Normal left ventricular internal dimensions and wall thicknesses.  6. Normal left ventricular systolic function. No segmental wall motion abnormalities.  7. Mild diastolic dysfunction (Stage I).  8. Normal right ventricular size and function.  9. Inadequate tricuspid regurgitation Doppler envelope precludes estimation of RVSP.  10. Normal tricuspid valve. Minimal tricuspid regurgitation.  *** Compared with echocardiogram report of 2/18/2014, no significant changes noted.    Cath:  3/24/17  CORONARY VESSELS: The coronary circulation is right dominant.  LM:   --  Mid left main: There was a 30 % stenosis.  LAD:   --  Ostial LAD: There was a 40 % stenosis.  CX:   --  Circumflex: Normal.  RCA:   --  RCA: Normal.  COMPLICATIONS: There were no complications.  DIAGNOSTIC RECOMMENDATIONS: The patient should continue with the present medications.    Imaging:    CXR: Personally reviewed  12/25/18     Labs: Personally reviewed                        9.9    5.65  )-----------( 290      ( 05 Jan 2019 15:01 )             32.3     01-05    137  |  89<L>  |  22  ----------------------------<  230<H>  4.4   |  36<H>  |  1.12    Ca    9.1      05 Jan 2019 13:07  Phos  3.8     01-05  Mg     1.9     01-05    TPro  6.4  /  Alb  3.8  /  TBili  0.3  /  DBili  x   /  AST  17  /  ALT  44  /  AlkPhos  52  01-05    PT/INR - ( 05 Jan 2019 13:07 )   PT: 10.5 sec;   INR: 0.92 ratio     PTT - ( 05 Jan 2019 13:07 )  PTT:28.7 sec    CARDIAC MARKERS ( 05 Jan 2019 13:07 )  38 ng/L / x     / x     / x     / x     / x

## 2019-01-05 NOTE — PROGRESS NOTE ADULT - ASSESSMENT
ASSESSMENT:    ongoing dyspnea with minimal exertion with chronic hypoxic and hypercapnic respiratory failure due to very severe COPD with "exacerbation" - adequate oxygenation on a nasal canula in the setting of profound dyspnea suggests that alterations in the mechanics of breathing due to lung hyperinflation and obesity are likely playing a significant role in her shortness of breath - anxiety is also playing a role - there is evidence of CAD without pulmonary hypertension on the CT scan which is without pulmonary emboli or pneumonia - cardiac catheterization last year revealed patent stents - ECHO has a preserved LVEF, no significant valvular heart disease and no pulmonary hypertension - resolved AURORA, hyperkalemia and hyponatremia - resolved respiratory acidosis on repeat ABG and the pCO2 level has decreased from the 80s back to the 60s - lower extremity weakness likely due to steroid induced myopathy perhaps exacerbated by statins is improving    extremity edema/discomfort likely related to steroid induced fluid retention - no evidence of DVT on repeat upper or lower extremity Duplex examination - improved upper extremity swelling but increasing lower extremity edema is making ambulation more difficulty    HTN/HLD/DM    CAD s/p PCI x 6    PAD    PLAN/RECOMMENDATIONS:    BIPAP 12/5 with 40% FiO2 for sleep - on and off during the day for increased work of breathing or recurrent respiratory acidosis - we will need to wean her off daytime BIPAP for transfer to an acute rehabilitation center  oxygen supplementation to keep saturation greater than 92% using a nasal canula when off BIPAP  follow ABG  sputum culture - no growth x2 - has completed a course of biaxin  home trilogy vent has been arranged with [facility]   albuterol/atrovent nebs q4h holding 2AM dose  pulmicort 0.5mg nebs q12h  singulair/theophylline - theophylline level is subtherapeutic - daliresp discontinued  prednisone 15mg daily - decrease by 5mg every 5 days - glucose control - nystatin   azithromycin TIW for antiinflammatory effects  cardiology evaluation noted  cardiac meds: ASA/imdur/lasix - off crestor with possible myopathy - off norvasc due to swelling - BP control adequate  diurese as tolerated by renal function and hemodynamics - watch for worsening metabolic alkalosis with loop diuretic use which could lead to worsening hypercapnia  GI/DVT prophylaxis - protoix/SQ lovenox  physical therapy daily  ACE bandage lower extremity wraps  holistic nurse evaluation Monday  transfer to acute rehab when able  outpatient evaluation for lung transplantation    Will follow with you. Plan of care discussed with the patient and her family at bedside and the dedicated floor NP and . I have reached out to the [facility] transplant team. They would like the patient transferred to [facility] acute rehab where they will be able to evaluate the patient for possible lung transplantation and perform appropriate testing as needed.    David Fair MD, [facility] - [phone]  Pulmonary Medicine

## 2019-01-05 NOTE — CHART NOTE - NSCHARTNOTEFT_GEN_A_CORE
While doing evening rounds , pt in picu noted to be -150 SVT  - pt is s/p RRT , reported recvd previously   - I niko Andrea cardiology about continued -150  - dc Amio/ bb/ Lovenox  - Start Diltiazem ivp x 1, po now and Q6, Eliquis, if hr remains above 110 1 hour post medications may start Diltiazem GTT  - Spoke w management on duty about transferring patient to a regular monitored unit   - Ekg ordered and reviewed unchanged , patient pt speaking in full sentences, denies cp, + sob 2/2 to non cooling room temperature (mod made aware)   - Will cont to monitor

## 2019-01-05 NOTE — PROGRESS NOTE ADULT - SUBJECTIVE AND OBJECTIVE BOX
[facility] - [facility]     PROGRESS NOTE    CHIEF COMPLAINT: chronic hypoxic/hypercapnic respiratory failure; COPD exacerbation; emphysema; dyspnea; obesity    INTERVAL HISTORY: slept in the chair with BIPAP overnight; off BIPAP all day yesterday with shortness of breath on a nasal canula @ 4lpm; worked with physical therapy with some difficulty due to lower extremity swelling and discomfort; ABG with improved hypercapnia without acidemia; arm swelling nearly resolved on diuretics and reduced dose of steroids; resolved AURORA, hyperkalemia and hyponatremia; no cough, sputum production, chest congestion or wheeze; no fevers, chills or sweats; no chest pain/pressure or palpitations; legs wrapped with ACE bandages; ABG and blood work unable to be obtained    REVIEW OF SYSTEMS:  Constitutional: As per interval history  HEENT: Within normal limits  CV: As per interval history  Resp: As per interval history  GI: Within normal limits   : Within normal limits  Musculoskeletal: improving lower extremity weakness   Skin: Within normal limits  Neurological: Within normal limits  Psychiatric: frustrated   Endocrine: Within normal limits  Hematologic/Lymphatic: Within normal limits  Allergic/Immunologic: Within normal limits      MEDICATIONS:     Pulmonary "  ALBUTerol/ipratropium for Nebulization 3 milliLiter(s) Nebulizer <User Schedule>  buDESOnide   0.5 milliGRAM(s) Respule 0.5 milliGRAM(s) Inhalation every 12 hours  montelukast 10 milliGRAM(s) Oral daily  theophylline ER Capsule 400 milliGRAM(s) Oral daily      Anti-microbials:  azithromycin   Tablet 250 milliGRAM(s) Oral <User Schedule>  nystatin    Suspension [id number] Unit(s) Oral four times a day      Cardiovascular:  furosemide    Tablet 40 milliGRAM(s) Oral daily  isosorbide   mononitrate ER Tablet (IMDUR) 30 milliGRAM(s) Oral daily      Other:  aluminum hydroxide/magnesium hydroxide/simethicone Suspension 30 milliLiter(s) Oral every 6 hours PRN  artificial tears (preservative free) Ophthalmic Solution 1 Drop(s) Both EYES three times a day  aspirin enteric coated 81 milliGRAM(s) Oral daily  Biotene Dry Mouth Oral Rinse 5 milliLiter(s) Swish and Spit two times a day  bisacodyl Suppository 10 milliGRAM(s) Rectal daily PRN  cholecalciferol 2000 Unit(s) Oral daily  dextrose 40% Gel 15 Gram(s) Oral once PRN  dextrose 5%. 1000 milliLiter(s) IV Continuous <Continuous>  dextrose 50% Injectable 12.5 Gram(s) IV Push once  dextrose 50% Injectable 25 Gram(s) IV Push once  dextrose 50% Injectable 25 Gram(s) IV Push once  docusate sodium 100 milliGRAM(s) Oral three times a day  enoxaparin Injectable 40 milliGRAM(s) SubCutaneous every 24 hours  glucagon  Injectable 1 milliGRAM(s) IntraMuscular once PRN  insulin glargine Injectable (LANTUS) 8 Unit(s) SubCutaneous at bedtime  insulin lispro (HumaLOG) corrective regimen sliding scale   SubCutaneous three times a day before meals  insulin lispro (HumaLOG) corrective regimen sliding scale   SubCutaneous at bedtime  insulin lispro Injectable (HumaLOG) 4 Unit(s) SubCutaneous before breakfast  insulin lispro Injectable (HumaLOG) 3 Unit(s) SubCutaneous with dinner  insulin lispro Injectable (HumaLOG) 4 Unit(s) SubCutaneous before lunch  melatonin 1 milliGRAM(s) Oral at bedtime  multivitamin 1 Tablet(s) Oral daily  ondansetron Injectable 4 milliGRAM(s) IV Push every 6 hours PRN  pantoprazole    Tablet 40 milliGRAM(s) Oral before breakfast  petrolatum Ophthalmic Ointment 1 Application(s) Both EYES at bedtime PRN  polyethylene glycol 3350 17 Gram(s) Oral daily  predniSONE   Tablet 15 milliGRAM(s) Oral daily  predniSONE   Tablet   Oral   senna 2 Tablet(s) Oral at bedtime  simethicone 80 milliGRAM(s) Chew once  sodium chloride 0.65% Nasal 1 Spray(s) Both Nostrils two times a day PRN        OBJECTIVE:    I&O's Detail    04 Jan 2019 07:01  -  05 Jan 2019 07:00  --------------------------------------------------------  IN:    Oral Fluid: 490 mL  Total IN: 490 mL    OUT:    Voided: 300 mL  Total OUT: 300 mL    Total NET: 190 mL    POCT Blood Glucose.: 234 mg/dL (05 Jan 2019 08:48)  POCT Blood Glucose.: 187 mg/dL (04 Jan 2019 22:08)  POCT Blood Glucose.: 256 mg/dL (04 Jan 2019 17:44)  POCT Blood Glucose.: 77 mg/dL (04 Jan 2019 12:59)      PHYSICAL EXAM:       ICU Vital Signs Last 24 Hrs  T(C): 36.2 (05 Jan 2019 08:50), Max: 36.8 (04 Jan 2019 13:09)  T(F): 97.1 (05 Jan 2019 08:50), Max: 98.2 (04 Jan 2019 13:09)  HR: 78 (05 Jan 2019 08:50) (78 - 98)  BP: 130/79 (05 Jan 2019 08:50) (130/79 - 149/91)  BP(mean): --  ABP: --  ABP(mean): --  RR: 15 (05 Jan 2019 08:50) (15 - 20)  SpO2: 100% (05 Jan 2019 08:50) (99% - 100%) on 4lpm nasal canula     General: Awake. Alert. Cooperative. No distress. Appears stated age. Obese. Cushingoid.  HEENT:  Atraumatic. Moonlike facies. Anicteric. Normal oral mucosa. PERRL. EOMI.   Neck: Supple. Trachea midline. Thyroid without enlargement/tenderness/nodules. No carotid bruit. No JVD.	  Cardiovascular: Regular rate and rhythm. Distant S1 S2. No murmurs, rubs or gallops.  Respiratory: Respirations unlabored. Markedly decreased breath sounds throughout. No wheeze. No curvature.  Abdomen: Soft. Non-tender. Non-distended. No organomegaly. No masses. Normal bowel sounds. Obese.  Extremities: Warm to touch. No clubbing or cyanosis. Moderate bilateral lower extremity edema up to the thigh. Legs wrapped with ACE bandages. Upper extremity swelling much improved.  Pulses: Decreased lower extremity peripheral pulses.  Skin: No rashes or lesions. No ecchymoses. No cyanosis. Warm to touch.   Lymph Nodes: Cervical, supraclavicular and axillary nodes normal  Neurological: Motor and sensory examination equal and normal. A and O x 3  Psychiatry: Appropriate mood and affect.      LABS:    01-03    135  |  86<L>  |  20  ----------------------------<  152<H>  3.9   |  39<H>  |  0.99    01-01    139  |  89<L>  |  30<H>  ----------------------------<  258<H>  4.3   |  39<H>  |  0.96    Ca      9.5      01-03    Ca      9.7      01-01    Phos    3.7     01-03      Mg       2.1     01-03    TPro  6.0  /  Alb  3.6  /  TBili  0.4  /  DBili  x   /  AST  28  /  ALT  43  /  AlkPhos  53  01-03    ABG - ( 03 Jan 2019 06:40 )  pH: 7.44  /  pCO2: 65    /  pO2: 152   / HCO3: 43    / Base Excess: 15.9  /  SaO2: 97        ABG - ( 01 Jan 2019 03:44 )  pH: 7.42  /  pCO2: 69    /  pO2: 165   / HCO3: 44    / Base Excess: 17.3  /  SaO2: 97        ABG - ( 31 Dec 2018 16:19 )  pH: 7.38  /  pCO2: 84    /  pO2: 26    / HCO3: 48    / Base Excess: 20.2  /  SaO2: 41        ABG - ( 18 Dec 2018 13:23 )  pH: 7.46  /  pCO2: 47    /  pO2: 88    / HCO3: 33    / Base Excess: 8.0   /  SaO2: 97        ABG - ( 16 Dec 2018 20:53 )  pH: 7.44  /  pCO2: 53    /  pO2: 173   / HCO3: 36    / Base Excess: 10.0  /  SaO2: 100       ABG - ( 13 Dec 2018 06:29 )  pH: 7.30  /  pCO2: 71    /  pO2: 182   / HCO3: 34    / Base Excess: 5.8   /  SaO2: 99        ABG - ( 13 Dec 2018 00:59 )  pH: 7.32  /  pCO2: 71    /  pO2: 75    / HCO3: 35    / Base Excess: 7.1   /  SaO2: 95        ABG - ( 11 Dec 2018 11:45 )  pH: 7.39  /  pCO2: 54    /  pO2: 98    / HCO3: 32    / Base Excess: 6.2   /  SaO2: 98        ABG - ( 09 Dec 2018 11:26 )  pH: 7.35  /  pCO2: 63    /  pO2: 145   / HCO3: 34    / Base Excess: 6.6   /  SaO2: 99      ABG - ( 09 Dec 2018 00:28 )  pH: 7.32  /  pCO2: 71    /  pO2: 124   / HCO3: 36    / Base Excess: 7.6   /  SaO2: 99        ABG - ( 08 Dec 2018 20:50 )  pH: 7.32  /  pCO2: 72    /  pO2: 80    / HCO3: 36    / Base Excess: 7.7   /  SaO2: 95        Serum Pro-Brain Natriuretic Peptide: 254 pg/mL (12-13 @ 14:00)    < from: Transthoracic Echocardiogram (12.07.18 @ 08:59) >    Patient name: GUY HARTMAN  YOB: 1960   Age: 58 (F)   MR#: [id number]  Study Date: 12/7/2018  Location: [address] [place], [place] [id number]: Laquita Armando [facility]  Study quality: Technically good  Referring Physician: Allen ingram MD  BloodPressure: 120/80 mmHg  Height: 163 cm  Weight: 88 kg  BSA: 1.9 m2  ------------------------------------------------------------------------  PROCEDURE: Transthoracic echocardiogram with 2-D, M-Mode  and complete spectral and color flow Doppler.  INDICATION: Other forms of dyspnea (R06.09)  ------------------------------------------------------------------------  Dimensions:    Normal Values:  LA:     3.6    2.0 - 4.0 cm  Ao:     2.9    2.0 - 3.8 cm  SEPTUM: 0.9    0.6 - 1.2 cm  PWT:    0.8    0.6- 1.1 cm  LVIDd:  4.9    3.0 - 5.6 cm  LVIDs:  2.9    1.8 - 4.0 cm  Derived variables:  LVMI: 73 g/m2  RWT: 0.32  Fractional short: 40 %  EF (Kevinicholtz): 71 %  Doppler Peak Velocity (m/sec): AoV=1.2  ------------------------------------------------------------------------  Observations:  Mitral Valve: Normal mitral valve.  Aortic Valve/Aorta: Normal trileaflet aortic valve. Peak  transaortic valve gradient equals 6 mm Hg, mean transaortic  valve gradient equals 3 mm Hg, aortic valve velocity time  integral equals 22 cm. Trace aortic regurgitation.  Aortic Root: 2.9 cm.  Left Atrium: Normal left atrium.  LA volume index = 18  cc/m2.  Left Ventricle: Normal left ventricular systolic function.  No segmental wall motion abnormalities. Normal left  ventricular internal dimensions and wall thicknesses. Mild  diastolic dysfunction (Stage I).  Right Heart: Normal right atrium. Normal right ventricular  size and function. Normal tricuspid valve. Minimal  tricuspid regurgitation. Normal pulmonic valve.  Pericardium/Pleura: Normal pericardium with no pericardial  effusion.  Hemodynamic: Estimated right atrial pressure is 8 mm Hg.  Inadequate tricuspid regurgitation Doppler envelope  precludes estimation of RVSP.  ------------------------------------------------------------------------  Conclusions:  1. Normal mitral valve.  2. Normal trileaflet aortic valve. Peak transaortic valve  gradient equals 6 mm Hg, mean transaortic valve gradient  equals 3 mm Hg, aortic valve velocity time integral equals  22 cm. Trace aortic regurgitation.  3. Aortic Root: 2.9 cm.  4. Normal left atrium.  LA volume index = 18 cc/m2.  5. Normal left ventricular internal dimensions and wall  thicknesses.  6. Normal left ventricular systolic function. No segmental  wall motion abnormalities.  7. Mild diastolic dysfunction (Stage I).  8. Normal right ventricular size and function.  9. Inadequate tricuspid regurgitation Doppler envelope  precludes estimation of RVSP.  10. Normal tricuspid valve. Minimal tricuspid  regurgitation.  *** Compared with echocardiogram report of 2/18/2014, no  significant changes noted.  ------------------------------------------------------------------------  Confirmed on  12/7/2018 - 13:49:43 by Shefali Gómez M.D.  ------------------------------------------------------------------------    < end of copied text >  ------------------------------------------------------------------------------------------------  MICROBIOLOGY:     Culture - Sputum . (12.07.18 @ 08:34)    Gram Stain:   Rare polymorphonuclear leukocytes per low power field  Rare Squamous epithelial cells per low power field  Numerous Gram Positive Cocci in Pairs and Chains per oil power field    Specimen Source: .Sputum Sputum    Culture Results:   Normal Respiratory Samaria present    Culture - Sputum . (12.05.18 @ 22:50)    Gram Stain:   Moderate polymorphonuclear leukocytes per low power field  Few Squamous epithelial cells per low power field  Moderate Gram Positive Cocci in Pairs and Chains per oil power field    Specimen Source: .Sputum Sputum    Culture Results:   Normal Respiratory Samaria present    Rapid Respiratory Viral Panel (11.30.18 @ 01:30)    Rapid RVP Result: NotDete: The Film[employer] RVP Rapid uses polymerase chain reaction (PCR) and melt  curve analysis to screen for adenovirus; coronavirus HKU1, NL63, 229E,  OC43; human metapneumovirus (hMPV); human enterovirus/rhinovirus  (Entero/RV); influenza A; influenza A/H1;influenza A/H3; influenza  A/H1-2009; influenza B; parainfluenza viruses 1, 2, 3, 4; respiratory  syncytial virus; Bordetella pertussis; Mycoplasma pneumoniae; and  Chlamydophila pneumoniae.    RADIOLOGY:  [x] Chest radiographs reviewed and interpreted by me    < from: [facility] Duplex Lower Ext Vein Scan, Bilat (12.30.18 @ 19:06) >    EXAM:  DUPLEX SCAN EXT VEINS LOWER BI                          PROCEDURE DATE:  12/30/2018      INTERPRETATION:  Clinical indication: Worsening edema.    Technique:  Grayscale, color Doppler and spectral Doppler ultrasound was   utilized toevaluate bilateral lower extremity deep venous system.      Comparison: 12/6/2018.    Findings: There is no thrombosis in bilateral common femoral veins,   femoral veins or popliteal veins. Visualized calf veins are patent. There   is bilateral lowerextremity soft tissue edema.    Impression:     No evidence of deep vein thrombosis in either lower extremity.    BROCK TOBIN M.D., ATTENDING RADIOLOGIST  This document has been electronically signed. Dec 30 2018  7:24PM     < end of copied text >  ---------------------------------------------------------------------------------------------------------------  < from: VA Duplex Upper Ext Vein Scan, Bilat (12.30.18 @ 19:05) >    EXAM:  DUPLEX SCAN EXT VEINS UPPER BI                          PROCEDURE DATE:  12/30/2018      INTERPRETATION:  Clinical information: Worsening bilateral upper   extremity edema.    Findings: Duplex evaluation of the deep venous system of the right and   left upper extremity was performed to include the brachiocephalic,   internal jugular, subclavian, axillary, brachial, radial and ulnar veins.   No echogenic thrombus is seen within the vein lumina. Complete coaptation   of those segments of vein accessible to compression was achieved.   Spontaneous venous flow with respiratory and cardiac pulsatility is noted   throughout.     The right innominate vein is patent. The left innominate vein was not   visualized.     The right and left basilic and cephalic veins are patent and compressible.    Impression: No duplex evidence of DVT in the right and left upper   extremity.    RAYMUNDO DUMONT M.D., ATTENDING RADIOLOGIST  This document has been electronically signed. Dec 31 2018  8:49AM     < end of copied text >  ---------------------------------------------------------------------------------------------------------------  < from: Xray Chest 1 View AP/PA (12.25.18 @ 18:04) >    EXAM:  XR CHEST AP OR PA 1V                          PROCEDURE DATE:  12/25/2018      INTERPRETATION:  CLINICAL INFORMATION: Shortness of breath. History of   COPD.    TECHNIQUE: AP view of the chest 12/25/2018.    COMPARISON: Chest radiograph 12/13/2018.     FINDINGS:     The lungs are clear. There is no pneumothorax and no pleural effusions.   Cardiac size is not accurately evaluated in this projection.  The visualized osseous structures demonstrate no acute abnormality.    IMPRESSION:   Clear lungs.    SUSANA HODGES M.D., RADIOLOGY RESIDENT  This document has been electronically signed.  SVETLANA SANDOVAL M.D., ATTENDING RADIOLOGIST  This document has been electronically signed. Dec 26 2018 10:59AM      < end of copied text >  ---------------------------------------------------------------------------------------------------------------  < from: CT Angio Chest w/ IV Cont (12.04.18 @ 16:30) >    EXAM:  CT ANGIO CHEST (W)AW IC                          PROCEDURE DATE:  12/04/2018      INTERPRETATION:  CT CHEST WITH CONTRAST    INDICATION: Known COPD not responding to steroids and bronchodilators.   Progressively worsening dyspnea. Evaluate for pulmonary embolism.    TECHNIQUE: Enhanced helical images were obtained of the chest. Coronal   and sagittal images were reconstructed. Maximum intensity projection   images were generated. Images were obtained after the uneventful   administration of 60 cc nonionic intravenous Omnipaque 350.  40 cc of   Omnipaque 350 was discarded.    COMPARISON: CT chest 2/18/2014.    FINDINGS:     Lungs And Airways: Emphysematous changes of the lung.      The airways are unremarkable.      Pleura: No pneumothorax. No pleural effusion.    Mediastinum: There are no enlarged chest lymph nodes. The visualized   portion of the thyroid gland is unremarkable.       Heart and Vasculature: No pulmonary embolism.    The main pulmonary artery is normal in caliber. No thoracic aortic   aneurysm or dissection. Atheromatous disease of the aorta.    The heart is normal in size.  There is no pericardial effusion.   Coronary artery disease with calcified plaque involving the right and   left main coronary arteries and the left anterior descending artery.      Upper Abdomen: The upper abdomen is unremarkable.    Bones And Soft Tissues: Degenerative changes of the spine.  The soft   tissues are unremarkable.      IMPRESSION:   1.  No pulmonary embolism.  2. Emphysematous changes of the lung.    DIANA MEDINA M.D., RADIOLOGY RESIDENT  This document has been electronically signed.  JEYSON SANCHEZ M.D., ATTENDING RADIOLOGIST  This document has been electronically signed. Dec  4 2018  5:21PM      < end of copied text >  ---------------------------------------------------------------------------------------------------------------  SPIROMETRY:     FEV1 0.56 liters - 22% predicted  FVC 1.73 liters - 52% predicted  FEV1% 32    c/w very severe obstructive lung disease

## 2019-01-05 NOTE — PROGRESS NOTE ADULT - PROBLEM SELECTOR PLAN 1
1/5 patient with chest pain found with new onset afib with RVR - RRT, given cardizem 5mg, 5mg, 10mg and then lopressor 5mg, rate controlled in 100s, will start PO 25mg bid lopressor, cardiology to see, transfer to tele.  Cardiology aware of the pulm status, will monitor now with starting lopressor, cardizem did not work.   Check troponin, will need to trend, TTE, cards eval

## 2019-01-05 NOTE — CHART NOTE - NSCHARTNOTEFT_GEN_A_CORE
Asked to evaluate Pt c/o CP , with HR in 160's. seen and examined found sitting in chair c/o Mild   non radiating Midsternal CP .  Denied any sob , dizziness ,   diaphoresis , nausea , vomiting or  abdominal pain    12 lead EKG  with Aflutter   Cardizem 5 mg IVP  x 1   since the HR was not broke proceeded to call RRT   During the RRT pt was given  cardizem 5 mg IVP  followed by 10 mg  with noresponse   DR Pirnce  advised for IV Lopressor ,  Pt converted to SR   F/U CMP , Lactate   Tele monitoring .  DR Crandall present  during the events .    Luis Miguel [place] NP-C [id number]

## 2019-01-05 NOTE — CHART NOTE - NSCHARTNOTEFT_GEN_A_CORE
RRT called for Aflutter with RVR. Pt is currently hospitalized for acute on chronic hypercarbic respiratory failure 2/2 COPD exacerbation. On cardiac monitor, pt with HR in the 160s, irregular. 12-lead EKG showed Aflutter, which is new for pt. Pt had been given cardizem 5mg IV x1 approximately 20 minutes prior to RRT without adequate response. SBP stable in the 120s, oxygen saturation 100%, pt appeared to be comfortable on nasal cannula. Pt afebrile. Pt was given another 5mg of IV Cardizem with no response in HR, which remained in the 150s-160s. Another 10mg IV Cardizem was given still without adequate response. Pt's SBP dropped slightly to the 100s. Pt was then given Lopressor 5mg IV x1 with improvement in HR to the 100s. Repeat EKG showed Afib. Pt's cardiology team was contacted and pt will be started on PO metoprolol. Labs including CBC, BMP, cardiac enzymes, coags and lactate were sent. Primary team at bedside throughout RRT and will follow up cardiology recommendations.     For follow-up:  - Start PO metoprolol tartrate 25mg po BID, follow up cardiology recommendations  - Will need to be transferred to telemetry once a bed is available  - Follow up labs    Elizabeth Townsend MD  MAR [id number]

## 2019-01-05 NOTE — PROGRESS NOTE ADULT - ASSESSMENT
58 F PMH COPD on home O2 3L, HTN, HLD, CAD s/p x6 stents, T2DM, pHTN, PVD, p/w progressive worsening dyspnea x 2 weeks now complicated by chronic progressive hypoxic respiratory failure.     1. Chronic progressive hypoxic respiratory failure  multifactorial in the setting of worsening COPD, CAD, chronic diastolic CHF, pulmonary HTN   bl feet edema improving, continue lasix 40mg PO daily   UE/ LE dopplers negative for DVT   echo with normal LV function, mild diastolic dysfunction, inadequate tricuspid regurg   cv stable no chest pain or sob   bipap PRN  continue dueonebs, inhaled steroids, singulair/theophylline/daliresp, pulm f/u   EKG reviewed, corrected QT WNL (aprox 462), on azithromycin    2. CAD s/p PCI  cv stable, continue asa, imdur    3. COPD   on bipap  continue Duoneb inhaled steroids, singulair/theophylline/daliresp, pulm f/u     4. hyponatremia/hyperkalemia  trend bmp, med f/u  dvt ppx   dc planning per primary team; Transfer to Pulmonary Rehab likely monday

## 2019-01-05 NOTE — PROGRESS NOTE ADULT - ASSESSMENT
58-year-old woman with PMH of COPD on home O2 3L, HTN, HLD, CAD s/p 6 stents, Type 2 DM, PVD, p/w progressive worsening dyspnea x 2 weeks a/w COPD exacerbation, acute on chronic hypoxic/hypercarbic respiratory failure, persistent dependency on bipap. 1/5 patient with new onset afib with RVR - RRT, given cardizem 5mg, 5mg, 10mg and then lopressor 5mg, rate controlled in 100s, will start PO lopressor, cardiology to see, transfer to tele.

## 2019-01-06 LAB
ANION GAP SERPL CALC-SCNC: 13 MMOL/L — SIGNIFICANT CHANGE UP (ref 5–17)
BASE EXCESS BLDA CALC-SCNC: 13.3 MMOL/L — HIGH (ref -2–2)
BUN SERPL-MCNC: 19 MG/DL — SIGNIFICANT CHANGE UP (ref 7–23)
CALCIUM SERPL-MCNC: 9.4 MG/DL — SIGNIFICANT CHANGE UP (ref 8.4–10.5)
CHLORIDE SERPL-SCNC: 88 MMOL/L — LOW (ref 96–108)
CO2 BLDA-SCNC: 42 MMOL/L — HIGH (ref 22–30)
CO2 SERPL-SCNC: 36 MMOL/L — HIGH (ref 22–31)
CREAT SERPL-MCNC: 1.02 MG/DL — SIGNIFICANT CHANGE UP (ref 0.5–1.3)
GAS PNL BLDA: SIGNIFICANT CHANGE UP
GLUCOSE BLDC GLUCOMTR-MCNC: 106 MG/DL — HIGH (ref 70–99)
GLUCOSE BLDC GLUCOMTR-MCNC: 211 MG/DL — HIGH (ref 70–99)
GLUCOSE BLDC GLUCOMTR-MCNC: 230 MG/DL — HIGH (ref 70–99)
GLUCOSE BLDC GLUCOMTR-MCNC: 236 MG/DL — HIGH (ref 70–99)
GLUCOSE BLDC GLUCOMTR-MCNC: 83 MG/DL — SIGNIFICANT CHANGE UP (ref 70–99)
GLUCOSE SERPL-MCNC: 195 MG/DL — HIGH (ref 70–99)
HCO3 BLDA-SCNC: 40 MMOL/L — HIGH (ref 21–29)
HCT VFR BLD CALC: 30.4 % — LOW (ref 34.5–45)
HGB BLD-MCNC: 10.2 G/DL — LOW (ref 11.5–15.5)
HOROWITZ INDEX BLDA+IHG-RTO: 36 — SIGNIFICANT CHANGE UP
MCHC RBC-ENTMCNC: 30 PG — SIGNIFICANT CHANGE UP (ref 27–34)
MCHC RBC-ENTMCNC: 33.6 GM/DL — SIGNIFICANT CHANGE UP (ref 32–36)
MCV RBC AUTO: 89.1 FL — SIGNIFICANT CHANGE UP (ref 80–100)
PCO2 BLDA: 68 MMHG — HIGH (ref 32–46)
PH BLDA: 7.39 — SIGNIFICANT CHANGE UP (ref 7.35–7.45)
PLATELET # BLD AUTO: 331 K/UL — SIGNIFICANT CHANGE UP (ref 150–400)
PO2 BLDA: 99 MMHG — SIGNIFICANT CHANGE UP (ref 74–108)
POTASSIUM SERPL-MCNC: 3.8 MMOL/L — SIGNIFICANT CHANGE UP (ref 3.5–5.3)
POTASSIUM SERPL-SCNC: 3.8 MMOL/L — SIGNIFICANT CHANGE UP (ref 3.5–5.3)
RBC # BLD: 3.41 M/UL — LOW (ref 3.8–5.2)
RBC # FLD: 15 % — HIGH (ref 10.3–14.5)
SAO2 % BLDA: 98 % — HIGH (ref 92–96)
SODIUM SERPL-SCNC: 137 MMOL/L — SIGNIFICANT CHANGE UP (ref 135–145)
WBC # BLD: 7.3 K/UL — SIGNIFICANT CHANGE UP (ref 3.8–10.5)
WBC # FLD AUTO: 7.3 K/UL — SIGNIFICANT CHANGE UP (ref 3.8–10.5)

## 2019-01-06 PROCEDURE — 93010 ELECTROCARDIOGRAM REPORT: CPT

## 2019-01-06 PROCEDURE — 99233 SBSQ HOSP IP/OBS HIGH 50: CPT

## 2019-01-06 RX ORDER — AMIODARONE HYDROCHLORIDE 400 MG/1
150 TABLET ORAL ONCE
Qty: 0 | Refills: 0 | Status: COMPLETED | OUTPATIENT
Start: 2019-01-06 | End: 2019-01-06

## 2019-01-06 RX ORDER — INSULIN GLARGINE 100 [IU]/ML
9 INJECTION, SOLUTION SUBCUTANEOUS AT BEDTIME
Qty: 0 | Refills: 0 | Status: DISCONTINUED | OUTPATIENT
Start: 2019-01-06 | End: 2019-01-08

## 2019-01-06 RX ORDER — AMIODARONE HYDROCHLORIDE 400 MG/1
400 TABLET ORAL
Qty: 0 | Refills: 0 | Status: DISCONTINUED | OUTPATIENT
Start: 2019-01-06 | End: 2019-01-08

## 2019-01-06 RX ORDER — METOPROLOL TARTRATE 50 MG
5 TABLET ORAL ONCE
Qty: 0 | Refills: 0 | Status: COMPLETED | OUTPATIENT
Start: 2019-01-06 | End: 2019-01-06

## 2019-01-06 RX ORDER — AMIODARONE HYDROCHLORIDE 400 MG/1
150 TABLET ORAL ONCE
Qty: 0 | Refills: 0 | Status: DISCONTINUED | OUTPATIENT
Start: 2019-01-06 | End: 2019-01-06

## 2019-01-06 RX ORDER — IPRATROPIUM BROMIDE 0.2 MG/ML
500 SOLUTION, NON-ORAL INHALATION EVERY 6 HOURS
Qty: 0 | Refills: 0 | Status: DISCONTINUED | OUTPATIENT
Start: 2019-01-06 | End: 2019-01-15

## 2019-01-06 RX ORDER — MAGNESIUM SULFATE 500 MG/ML
1 VIAL (ML) INJECTION ONCE
Qty: 0 | Refills: 0 | Status: COMPLETED | OUTPATIENT
Start: 2019-01-06 | End: 2019-01-06

## 2019-01-06 RX ORDER — LEVALBUTEROL 1.25 MG/.5ML
0.63 SOLUTION, CONCENTRATE RESPIRATORY (INHALATION) EVERY 6 HOURS
Qty: 0 | Refills: 0 | Status: DISCONTINUED | OUTPATIENT
Start: 2019-01-06 | End: 2019-01-15

## 2019-01-06 RX ORDER — DILTIAZEM HCL 120 MG
15 CAPSULE, EXT RELEASE 24 HR ORAL
Qty: 125 | Refills: 0 | Status: DISCONTINUED | OUTPATIENT
Start: 2019-01-06 | End: 2019-01-06

## 2019-01-06 RX ORDER — METOPROLOL TARTRATE 50 MG
5 TABLET ORAL EVERY 6 HOURS
Qty: 0 | Refills: 0 | Status: DISCONTINUED | OUTPATIENT
Start: 2019-01-06 | End: 2019-01-07

## 2019-01-06 RX ORDER — DILTIAZEM HCL 120 MG
10 CAPSULE, EXT RELEASE 24 HR ORAL
Qty: 125 | Refills: 0 | Status: DISCONTINUED | OUTPATIENT
Start: 2019-01-06 | End: 2019-01-06

## 2019-01-06 RX ADMIN — Medication 100 MILLIGRAM(S): at 22:14

## 2019-01-06 RX ADMIN — Medication 400 MILLIGRAM(S): at 09:18

## 2019-01-06 RX ADMIN — Medication 3 MILLILITER(S): at 09:23

## 2019-01-06 RX ADMIN — POLYETHYLENE GLYCOL 3350 17 GRAM(S): 17 POWDER, FOR SOLUTION ORAL at 12:33

## 2019-01-06 RX ADMIN — Medication 5 MILLIGRAM(S): at 12:03

## 2019-01-06 RX ADMIN — INSULIN GLARGINE 9 UNIT(S): 100 INJECTION, SOLUTION SUBCUTANEOUS at 22:13

## 2019-01-06 RX ADMIN — Medication 3 MILLILITER(S): at 05:35

## 2019-01-06 RX ADMIN — SENNA PLUS 2 TABLET(S): 8.6 TABLET ORAL at 22:14

## 2019-01-06 RX ADMIN — Medication 500000 UNIT(S): at 17:10

## 2019-01-06 RX ADMIN — Medication 2: at 08:31

## 2019-01-06 RX ADMIN — Medication 81 MILLIGRAM(S): at 12:31

## 2019-01-06 RX ADMIN — Medication 1 MILLIGRAM(S): at 22:14

## 2019-01-06 RX ADMIN — AMIODARONE HYDROCHLORIDE 400 MILLIGRAM(S): 400 TABLET ORAL at 20:29

## 2019-01-06 RX ADMIN — Medication 100 MILLIGRAM(S): at 13:39

## 2019-01-06 RX ADMIN — MONTELUKAST 10 MILLIGRAM(S): 4 TABLET, CHEWABLE ORAL at 13:39

## 2019-01-06 RX ADMIN — Medication 500 MICROGRAM(S): at 17:09

## 2019-01-06 RX ADMIN — Medication 500000 UNIT(S): at 22:14

## 2019-01-06 RX ADMIN — AMIODARONE HYDROCHLORIDE 400 MILLIGRAM(S): 400 TABLET ORAL at 11:45

## 2019-01-06 RX ADMIN — Medication 3 UNIT(S): at 17:34

## 2019-01-06 RX ADMIN — Medication 5 MILLIGRAM(S): at 14:02

## 2019-01-06 RX ADMIN — Medication 5 MILLIGRAM(S): at 17:09

## 2019-01-06 RX ADMIN — Medication 500000 UNIT(S): at 12:34

## 2019-01-06 RX ADMIN — Medication 500000 UNIT(S): at 05:29

## 2019-01-06 RX ADMIN — AMIODARONE HYDROCHLORIDE 600 MILLIGRAM(S): 400 TABLET ORAL at 19:09

## 2019-01-06 RX ADMIN — Medication 5 MILLIGRAM(S): at 22:15

## 2019-01-06 RX ADMIN — Medication 15 MILLIGRAM(S): at 05:23

## 2019-01-06 RX ADMIN — Medication 2: at 14:02

## 2019-01-06 RX ADMIN — Medication 0.25 MILLIGRAM(S): at 05:11

## 2019-01-06 RX ADMIN — LEVALBUTEROL 0.63 MILLIGRAM(S): 1.25 SOLUTION, CONCENTRATE RESPIRATORY (INHALATION) at 23:30

## 2019-01-06 RX ADMIN — ONDANSETRON 4 MILLIGRAM(S): 8 TABLET, FILM COATED ORAL at 05:22

## 2019-01-06 RX ADMIN — Medication 500 MICROGRAM(S): at 23:30

## 2019-01-06 RX ADMIN — APIXABAN 5 MILLIGRAM(S): 2.5 TABLET, FILM COATED ORAL at 15:42

## 2019-01-06 RX ADMIN — Medication 1 DROP(S): at 05:29

## 2019-01-06 RX ADMIN — Medication 100 GRAM(S): at 22:59

## 2019-01-06 RX ADMIN — Medication 1 DROP(S): at 22:14

## 2019-01-06 RX ADMIN — LEVALBUTEROL 0.63 MILLIGRAM(S): 1.25 SOLUTION, CONCENTRATE RESPIRATORY (INHALATION) at 17:08

## 2019-01-06 RX ADMIN — PANTOPRAZOLE SODIUM 40 MILLIGRAM(S): 20 TABLET, DELAYED RELEASE ORAL at 05:22

## 2019-01-06 RX ADMIN — ISOSORBIDE MONONITRATE 30 MILLIGRAM(S): 60 TABLET, EXTENDED RELEASE ORAL at 12:32

## 2019-01-06 RX ADMIN — APIXABAN 5 MILLIGRAM(S): 2.5 TABLET, FILM COATED ORAL at 03:35

## 2019-01-06 RX ADMIN — Medication 10 MG/HR: at 02:47

## 2019-01-06 RX ADMIN — Medication 0.5 MILLIGRAM(S): at 17:08

## 2019-01-06 RX ADMIN — Medication 40 MILLIGRAM(S): at 05:22

## 2019-01-06 RX ADMIN — Medication 15 MG/HR: at 09:17

## 2019-01-06 RX ADMIN — Medication 2000 UNIT(S): at 12:31

## 2019-01-06 RX ADMIN — Medication 4 UNIT(S): at 13:37

## 2019-01-06 RX ADMIN — Medication 1 TABLET(S): at 12:33

## 2019-01-06 RX ADMIN — Medication 4 UNIT(S): at 08:30

## 2019-01-06 RX ADMIN — Medication 100 MILLIGRAM(S): at 05:23

## 2019-01-06 RX ADMIN — Medication 0.25 MILLIGRAM(S): at 14:14

## 2019-01-06 RX ADMIN — Medication 0.5 MILLIGRAM(S): at 05:41

## 2019-01-06 NOTE — PROGRESS NOTE ADULT - PROBLEM SELECTOR PLAN 1
-test BG AC/HS  - increase Lantus sq to 9 units QHS  -c/w Humalog 4-4-3 w/meals (ensure pt is eating prior to giving dose)  -c/w Humalog low correction scale AC and Low HS scale  Please notify endocrine when steroids further tapered so we can adjust the does of insulin  discharge plan: restart home Metformin if off steroids

## 2019-01-06 NOTE — CONSULT NOTE ADULT - ASSESSMENT
A: 57 yo female w h/o COPD on home O2, CAD (6 stents), DM2, PVD, prolonged hospital course due to acute on chronic hypoxic/hypercarbic respiratory failure and COPD exacerbation, who has now had episodes of tachycardia (A-fib w RVR and A-flutter/A-tach) since yesterday.    1. A-fib w RVR along with episodes of AT/A-flutter with HR of 150s     - Currently HR 110s, pt is asymptomatic and her BP has been stable     - c/w Amiodarone 400 BID, Digoxin, Lopressor 5 IV Q6     - If pt has additional episodes of AT/A-flutter with  can give additional push of Lopressor or Cardizem if BP tolerates     - Plan for cardioversion with EP in AM, pt is NPO     - Keep K > 4, Mg > 2     - On Eliquis for anticoagulation       Pt's blood pressure is stable at this time and she is not symptomatic. Continue to monitor on tele. Does not need to be transferred to CCU at this time. If any change in clinical status please call [phone].    Jacky Watt MD  Cardiology Fellow  p: [phone]

## 2019-01-06 NOTE — PROGRESS NOTE ADULT - ASSESSMENT
58-year-old woman with PMH of COPD on home O2 3L, HTN, HLD, CAD s/p 6 stents, Type 2 DM, PVD, p/w progressive worsening dyspnea x 2 weeks a/w COPD exacerbation, acute on chronic hypoxic/hypercarbic respiratory failure, persistent dependency on bipap. 1/5 patient with new onset afib with RVR - RRT, Yesterday received cardizem 5mg IV x2, cardizem 10mg x1, lopressor 5mg IV x1, amiodarone 150mg, lopressor 25mg, now on cardizem gtt at 15mg/hr and dig load. Currently on tele HR still in 110s, asymptomatic.

## 2019-01-06 NOTE — PROGRESS NOTE ADULT - PROBLEM SELECTOR PROBLEM 9
Prophylactic measure
Prophylactic measure
Essential hypertension
Essential hypertension
Coronary artery disease involving native heart without angina pectoris, unspecified vessel or lesion type
Essential hypertension
HTN (hypertension)
Prophylactic measure
Thrush
Thrush
Essential hypertension

## 2019-01-06 NOTE — PROGRESS NOTE ADULT - ASSESSMENT
ASSESSMENT:    atrial fibrillation with RVR in the setting of severe lung disease, long-term beta-agonist use, elevated sympathetic tone due to critical illness and anxiety, etc - still not rate controlled    ongoing dyspnea with minimal exertion with chronic hypoxic and hypercapnic respiratory failure due to very severe COPD with "exacerbation" - adequate oxygenation on a nasal canula in the setting of profound dyspnea suggests that alterations in the mechanics of breathing due to lung hyperinflation and obesity are likely playing a significant role in her shortness of breath - anxiety is also playing a role - there is evidence of CAD without pulmonary hypertension on the CT scan which is without pulmonary emboli or pneumonia - cardiac catheterization last year revealed patent stents - ECHO has a preserved LVEF, no significant valvular heart disease and no pulmonary hypertension - resolved AURORA, hyperkalemia and hyponatremia - resolved respiratory acidosis on repeat ABG and the pCO2 level has decreased from the 80s back to the 60s - lower extremity weakness likely due to steroid induced myopathy perhaps exacerbated by statins is improving    extremity edema/discomfort likely related to steroid induced fluid retention - no evidence of DVT on repeat upper or lower extremity Duplex examination - improved upper extremity and now lower extremity edema with wrapping and diuretics    HTN/HLD/DM    CAD s/p PCI x 6    PAD    PLAN/RECOMMENDATIONS:    would transfer to an ICU setting given severe underlying lung disease now with uncontrolled atrial fibrillation  cardiology and EP service recommendations noted but have yet to be successful  for DCCV tomorrow if remains in atrial fibrillation  cardiac meds: ASA/eliquis/imdur/lasix/amiodarone/digoxin - off crestor with possible myopathy - off norvasc due to swelling - BP control adequate  diurese as tolerated by renal function and hemodynamics - watch for worsening metabolic alkalosis with loop diuretic use which could lead to worsening hypercapnia  BIPAP 12/5 with 40% FiO2 for sleep - on and off during the day only as a last resort for increased work of breathing or recurrent respiratory acidosis - we need to wean her off daytime BIPAP for transfer to an acute rehabilitation center  oxygen supplementation to keep saturation greater than 92% using a nasal canula when off BIPAP  following ABG  sputum culture - no growth x2 - has completed a course of biaxin  home trilogy vent has been arranged with [facility]   change nebs to xopenex/atrovent q6h and pulmicort 0.5mg nebs q12h  singulair/theophylline - theophylline level is subtherapeutic - daliresp discontinued  prednisone 15mg daily - decrease by 5mg every 5 days - glucose control - nystatin   azithromycin TIW for antiinflammatory effects  GI/DVT prophylaxis - protoix/eliquis  physical therapy daily  ACE bandage lower extremity wraps  holistic nurse evaluation Monday  transfer to acute rehab when able  outpatient evaluation for lung transplantation    Will follow with you. Plan of care discussed with the patient at bedside and the dedicated floor NP. I have reached out to the [facility] transplant team. They would like the patient transferred to [facility] acute rehab when stable where they will be able to evaluate the patient for possible lung transplantation and perform appropriate testing as needed.    David Fair MD, [facility] - [phone]  Pulmonary Medicine

## 2019-01-06 NOTE — PROGRESS NOTE ADULT - ASSESSMENT
58 F PMH COPD on home O2 3L, HTN, HLD, CAD s/p x6 stents, T2DM, pHTN, PVD, p/w progressive worsening dyspnea x 2 weeks now complicated by chronic progressive hypoxic respiratory failure.     1. New onset AFIB  likely in the setting of chronic lung condition   pt. remains tachy   s/p dig load, continue PO dig  would consider increasing cardizem gtt if remains tachy  continue Eliquis  EP f/u     2. Chronic progressive hypoxic respiratory failure  multifactorial in the setting of worsening COPD, CAD, chronic diastolic CHF, pulmonary HTN   bl feet edema improving, continue lasix 40mg PO daily   UE/ LE dopplers negative for DVT   echo with normal LV function, mild diastolic dysfunction, inadequate tricuspid regurg   cv stable no chest pain or sob   bipap PRN  continue dueonebs, inhaled steroids, singulair/theophylline/daliresp, pulm f/u   EKG reviewed, corrected QT WNL (aprox 462), on azithromycin    3. CAD s/p PCI  cv stable, continue asa, imdur    4. COPD   on bipap  continue Duoneb inhaled steroids, singulair/theophylline/daliresp, pulm f/u     5. hyponatremia/hyperkalemia  trend bmp, med f/u  dvt ppx 58 F PMH COPD on home O2 3L, HTN, HLD, CAD s/p x6 stents, T2DM, pHTN, PVD, p/w progressive worsening dyspnea x 2 weeks now complicated by chronic progressive hypoxic respiratory failure.     1. New onset AFIB  likely in the setting of chronic lung condition   pt. remains tachy   s/p dig load, continue PO dig  bb/amio dc'd   would consider increasing cardizem gtt if remains tachy  considering cardioversion if HR doesnt respond to cardizem/dig  continue Eliquis  EP f/u     2. Chronic progressive hypoxic respiratory failure  multifactorial in the setting of worsening COPD, CAD, chronic diastolic CHF, pulmonary HTN   bl feet edema improving, continue lasix 40mg PO daily   UE/ LE dopplers negative for DVT   echo with normal LV function, mild diastolic dysfunction, inadequate tricuspid regurg   cv stable no chest pain or sob   bipap PRN  continue dueonebs, inhaled steroids, singulair/theophylline/daliresp, pulm f/u   EKG reviewed, corrected QT WNL (aprox 462), on azithromycin    3. CAD s/p PCI  cv stable, continue asa, imdur    4. COPD   on bipap  continue Duoneb inhaled steroids, singulair/theophylline/daliresp, pulm f/u     5. hyponatremia/hyperkalemia  trend bmp, med f/u  dvt ppx 58 F PMH COPD on home O2 3L, HTN, HLD, CAD s/p x6 stents, T2DM, pHTN, PVD, p/w progressive worsening dyspnea x 2 weeks now complicated by chronic progressive hypoxic respiratory failure.     1. New onset AFIB  likely in the setting of chronic lung condition   pt. remains tachy   s/p dig load, continue PO dig  bb/amio dc'd   d/c cardizem, not effective  ivp metorolol 5mg now, restart amio  monty/dccv bran if persists .  continue Eliquis  EP f/u     2. Chronic progressive hypoxic respiratory failure  multifactorial in the setting of worsening COPD, CAD, chronic diastolic CHF, pulmonary HTN   bl feet edema improving, continue lasix 40mg PO daily   UE/ LE dopplers negative for DVT   echo with normal LV function, mild diastolic dysfunction, inadequate tricuspid regurg   cv stable no chest pain or sob   bipap PRN  continue dueonebs, inhaled steroids, singulair/theophylline/daliresp, pulm f/u   EKG reviewed, corrected QT WNL (aprox 462), on azithromycin    3. CAD s/p PCI  cv stable, continue asa, imdur    4. COPD   on bipap  continue Duoneb inhaled steroids, singulair/theophylline/daliresp, pulm f/u     5. hyponatremia/hyperkalemia  trend bmp, med f/u  dvt ppx

## 2019-01-06 NOTE — PROGRESS NOTE ADULT - PROBLEM SELECTOR PLAN 1
1/5 patient with chest pain found with new onset afib with RVR - RRT, Yesterday received cardizem 5mg IV x2, cardizem 10mg x1, lopressor 5mg IV x1, amiodarone 150mg, lopressor 25mg, now on cardizem gtt at 15mg/hr and dig load.  Cardiology/EPS input appreciated. c/w tele monitoring, avoid BB and amio given pulm status   troponin negative, repeat TTE  May need cardioversion  c/w eliquis 5mg bid for AC

## 2019-01-06 NOTE — PROVIDER CONTACT NOTE (OTHER) - ACTION/TREATMENT ORDERED:
Lopressor IVP to be ordered. Dr. Hauser at bedside will dc cardizem. EP ordered amoiodarone po. Plan to cardiovert in am.

## 2019-01-06 NOTE — CHART NOTE - NSCHARTNOTEFT_GEN_A_CORE
MEDICINE NP    Notified by RN patient with HR up to 150, aflutter with intermittent SVT . Seen and examined patient at bedside. Patient is alert, NAD. Denies palpitations, CP, SOB, cough, N/V, or abd pain.    VITAL SIGNS:  T(C): 36.2 (01-06-19 @ 13:54), Max: 37.1 (01-06-19 @ 05:02)  HR: 105 (01-06-19 @ 17:15) (78 - 155)  BP: 118/66 (01-06-19 @ 17:15) (94/61 - 139/75)  RR: 19 (01-06-19 @ 13:54) (18 - 20)  SpO2: 98% (01-06-19 @ 13:54) (97% - 100%)        MEDICATIONS  (STANDING):  amiodarone    Tablet 400 milliGRAM(s) Oral two times a day  amiodarone IVPB 150 milliGRAM(s) IV Intermittent once  apixaban 5 milliGRAM(s) Oral every 12 hours  artificial tears (preservative free) Ophthalmic Solution 1 Drop(s) Both EYES three times a day  aspirin enteric coated 81 milliGRAM(s) Oral daily  azithromycin   Tablet 250 milliGRAM(s) Oral <User Schedule>  Biotene Dry Mouth Oral Rinse 5 milliLiter(s) Swish and Spit two times a day  buDESOnide   0.5 milliGRAM(s) Respule 0.5 milliGRAM(s) Inhalation every 12 hours  cholecalciferol 2000 Unit(s) Oral daily  dextrose 5%. 1000 milliLiter(s) (50 mL/Hr) IV Continuous <Continuous>  dextrose 50% Injectable 12.5 Gram(s) IV Push once  dextrose 50% Injectable 25 Gram(s) IV Push once  dextrose 50% Injectable 25 Gram(s) IV Push once  digoxin     Tablet 0.125 milliGRAM(s) Oral daily  docusate sodium 100 milliGRAM(s) Oral three times a day  furosemide    Tablet 40 milliGRAM(s) Oral daily  insulin glargine Injectable (LANTUS) 9 Unit(s) SubCutaneous at bedtime  insulin lispro (HumaLOG) corrective regimen sliding scale   SubCutaneous three times a day before meals  insulin lispro (HumaLOG) corrective regimen sliding scale   SubCutaneous at bedtime  insulin lispro Injectable (HumaLOG) 4 Unit(s) SubCutaneous before breakfast  insulin lispro Injectable (HumaLOG) 3 Unit(s) SubCutaneous with dinner  insulin lispro Injectable (HumaLOG) 4 Unit(s) SubCutaneous before lunch  ipratropium    for Nebulization 500 MICROGram(s) Nebulizer every 6 hours  isosorbide   mononitrate ER Tablet (IMDUR) 30 milliGRAM(s) Oral daily  levalbuterol Inhalation 0.63 milliGRAM(s) Inhalation every 6 hours  melatonin 1 milliGRAM(s) Oral at bedtime  metoprolol tartrate Injectable 5 milliGRAM(s) IV Push every 6 hours  montelukast 10 milliGRAM(s) Oral daily  multivitamin 1 Tablet(s) Oral daily  nystatin    Suspension 451438 Unit(s) Oral four times a day  pantoprazole    Tablet 40 milliGRAM(s) Oral before breakfast  polyethylene glycol 3350 17 Gram(s) Oral daily  predniSONE   Tablet 15 milliGRAM(s) Oral daily  predniSONE   Tablet   Oral   senna 2 Tablet(s) Oral at bedtime  simethicone 80 milliGRAM(s) Chew once  theophylline ER Capsule 400 milliGRAM(s) Oral daily    MEDICATIONS  (PRN):  aluminum hydroxide/magnesium hydroxide/simethicone Suspension 30 milliLiter(s) Oral every 6 hours PRN Dyspepsia  bisacodyl Suppository 10 milliGRAM(s) Rectal daily PRN Constipation  dextrose 40% Gel 15 Gram(s) Oral once PRN Blood Glucose LESS THAN 70 milliGRAM(s)/deciliter  glucagon  Injectable 1 milliGRAM(s) IntraMuscular once PRN Glucose LESS THAN 70 milligrams/deciliter  ondansetron Injectable 4 milliGRAM(s) IV Push every 6 hours PRN Nausea and/or Vomiting  petrolatum Ophthalmic Ointment 1 Application(s) Both EYES at bedtime PRN dry eyes  sodium chloride 0.65% Nasal 1 Spray(s) Both Nostrils two times a day PRN Nasal Congestion        LABORATORY:                          10.2   7.3   )-----------( 331      ( 06 Jan 2019 07:19 )             30.4       01-06    137  |  88<L>  |  19  ----------------------------<  195<H>  3.8   |  36<H>  |  1.02    Ca    9.4      06 Jan 2019 07:19  Phos  3.8     01-05  Mg     1.9     01-05    TPro  6.4  /  Alb  3.8  /  TBili  0.3  /  DBili  x   /  AST  17  /  ALT  44  /  AlkPhos  52  01-05        PHYSICAL EXAM:    Constitutional: AOx3. NAD.    Respiratory: clear lungs bilaterally.     Cardiovascular: S1 S2.     Gastrointestinal: BS X4 active. soft. nontender.        ASSESSMENT/PLAN:   HPI:  58 F PMH COPD on home O2 3L, HTN, HLD, CAD s/p x6 stents, T2DM, pHTN, PVD, p/w progressive worsening dyspnea x 2 weeks. Pt says she normally uses 3L NC atc at home.  Has had increasing dyspnea and fatigue, worse with minimal exertion. Was told to go to ER last week for eval but tried to manage at home, now her sx have progressed. Of note pt was prev evaluated for lung transplant at [place], but during testing had NSTEMI requiring PCI x 6 in 2016, and was then on Effient for 1 year and recommended repeat testing/rehab prior to subsequent evaluation. RRT on 1/5/19 MEDICINE NP    Notified by RN patient with HR up to 150, aflutter with intermittent SVT . Seen and examined patient at bedside. Patient is alert, NAD. Denies palpitations, CP, SOB, cough, N/V, or abd pain.    VITAL SIGNS:  T(C): 36.2 (01-06-19 @ 13:54), Max: 37.1 (01-06-19 @ 05:02)  HR: 105 (01-06-19 @ 17:15) (78 - 155)  BP: 118/66 (01-06-19 @ 17:15) (94/61 - 139/75)  RR: 19 (01-06-19 @ 13:54) (18 - 20)  SpO2: 98% (01-06-19 @ 13:54) (97% - 100%)        MEDICATIONS  (STANDING):  amiodarone    Tablet 400 milliGRAM(s) Oral two times a day  amiodarone IVPB 150 milliGRAM(s) IV Intermittent once  apixaban 5 milliGRAM(s) Oral every 12 hours  artificial tears (preservative free) Ophthalmic Solution 1 Drop(s) Both EYES three times a day  aspirin enteric coated 81 milliGRAM(s) Oral daily  azithromycin   Tablet 250 milliGRAM(s) Oral <User Schedule>  Biotene Dry Mouth Oral Rinse 5 milliLiter(s) Swish and Spit two times a day  buDESOnide   0.5 milliGRAM(s) Respule 0.5 milliGRAM(s) Inhalation every 12 hours  cholecalciferol 2000 Unit(s) Oral daily  dextrose 5%. 1000 milliLiter(s) (50 mL/Hr) IV Continuous <Continuous>  dextrose 50% Injectable 12.5 Gram(s) IV Push once  dextrose 50% Injectable 25 Gram(s) IV Push once  dextrose 50% Injectable 25 Gram(s) IV Push once  digoxin     Tablet 0.125 milliGRAM(s) Oral daily  docusate sodium 100 milliGRAM(s) Oral three times a day  furosemide    Tablet 40 milliGRAM(s) Oral daily  insulin glargine Injectable (LANTUS) 9 Unit(s) SubCutaneous at bedtime  insulin lispro (HumaLOG) corrective regimen sliding scale   SubCutaneous three times a day before meals  insulin lispro (HumaLOG) corrective regimen sliding scale   SubCutaneous at bedtime  insulin lispro Injectable (HumaLOG) 4 Unit(s) SubCutaneous before breakfast  insulin lispro Injectable (HumaLOG) 3 Unit(s) SubCutaneous with dinner  insulin lispro Injectable (HumaLOG) 4 Unit(s) SubCutaneous before lunch  ipratropium    for Nebulization 500 MICROGram(s) Nebulizer every 6 hours  isosorbide   mononitrate ER Tablet (IMDUR) 30 milliGRAM(s) Oral daily  levalbuterol Inhalation 0.63 milliGRAM(s) Inhalation every 6 hours  melatonin 1 milliGRAM(s) Oral at bedtime  metoprolol tartrate Injectable 5 milliGRAM(s) IV Push every 6 hours  montelukast 10 milliGRAM(s) Oral daily  multivitamin 1 Tablet(s) Oral daily  nystatin    Suspension 908526 Unit(s) Oral four times a day  pantoprazole    Tablet 40 milliGRAM(s) Oral before breakfast  polyethylene glycol 3350 17 Gram(s) Oral daily  predniSONE   Tablet 15 milliGRAM(s) Oral daily  predniSONE   Tablet   Oral   senna 2 Tablet(s) Oral at bedtime  simethicone 80 milliGRAM(s) Chew once  theophylline ER Capsule 400 milliGRAM(s) Oral daily    MEDICATIONS  (PRN):  aluminum hydroxide/magnesium hydroxide/simethicone Suspension 30 milliLiter(s) Oral every 6 hours PRN Dyspepsia  bisacodyl Suppository 10 milliGRAM(s) Rectal daily PRN Constipation  dextrose 40% Gel 15 Gram(s) Oral once PRN Blood Glucose LESS THAN 70 milliGRAM(s)/deciliter  glucagon  Injectable 1 milliGRAM(s) IntraMuscular once PRN Glucose LESS THAN 70 milligrams/deciliter  ondansetron Injectable 4 milliGRAM(s) IV Push every 6 hours PRN Nausea and/or Vomiting  petrolatum Ophthalmic Ointment 1 Application(s) Both EYES at bedtime PRN dry eyes  sodium chloride 0.65% Nasal 1 Spray(s) Both Nostrils two times a day PRN Nasal Congestion        LABORATORY:                          10.2   7.3   )-----------( 331      ( 06 Jan 2019 07:19 )             30.4       01-06    137  |  88<L>  |  19  ----------------------------<  195<H>  3.8   |  36<H>  |  1.02    Ca    9.4      06 Jan 2019 07:19  Phos  3.8     01-05  Mg     1.9     01-05    TPro  6.4  /  Alb  3.8  /  TBili  0.3  /  DBili  x   /  AST  17  /  ALT  44  /  AlkPhos  52  01-05        PHYSICAL EXAM:    Constitutional: AOx3. NAD.    Respiratory: clear lungs bilaterally.     Cardiovascular: S1 S2.     Gastrointestinal: BS X4 active. soft. nontender.        ASSESSMENT/PLAN:   HPI:  58 F PMH COPD on home O2 3L, HTN, HLD, CAD s/p x6 stents, T2DM, pHTN, PVD, p/w progressive worsening dyspnea x 2 weeks. Pt says she normally uses 3L NC atc at home.  Has had increasing dyspnea and fatigue, worse with minimal exertion. Was told to go to ER last week for eval but tried to manage at home, now her sx have progressed. Of note pt was prev evaluated for lung transplant at [place], but during testing had NSTEMI requiring PCI x 6 in 2016, and was then on Effient for 1 year and recommended repeat testing/rehab prior to subsequent evaluation. RRT on 1/5/19 for SVT with HR in 150's.    Pt heart rate  aflutter/afib and SVT x2 on EKG throughout the day. Case d/w cardiology and EP. Cardizem drip discontinued, Lopressor 5 mg IV x1, now Q6 hours, amiodarone 400 mg BID. CCU consult called in for uncontrolled heart rate in the setting of severe underlying lung disease, Patient not a candidate at this time. HR again 146, amio 150 mg IV x1 ordered. Dr. Crandall updated on pt condition. Will continue to closely monitor, if HR remains elevated consider call RRT.     Nath[place] Echavarria NP  [id number]

## 2019-01-06 NOTE — PROGRESS NOTE ADULT - SUBJECTIVE AND OBJECTIVE BOX
Patient is a 58y old  Female who presents with a chief complaint of dyspnea (06 Jan 2019 09:44)      SUBJECTIVE / OVERNIGHT EVENTS: Patient seen and examined at bedside - patient denies CP, sob, no change in her breathing. Overnight continues to have afib with RVR, no further CP. Yesterday received cardizem 5mg IV x2, cardizem 10mg x1, lopressor 5mg IV x1, amiodarone 150mg, lopressor 25mg, now on cardizem gtt at 15mg/hr and dig load.    ROS:  As above. All other review of systems negative    Allergies    No Known Allergies    Intolerances        MEDICATIONS  (STANDING):  ALBUTerol/ipratropium for Nebulization 3 milliLiter(s) Nebulizer <User Schedule>  apixaban 5 milliGRAM(s) Oral every 12 hours  artificial tears (preservative free) Ophthalmic Solution 1 Drop(s) Both EYES three times a day  aspirin enteric coated 81 milliGRAM(s) Oral daily  azithromycin   Tablet 250 milliGRAM(s) Oral <User Schedule>  Biotene Dry Mouth Oral Rinse 5 milliLiter(s) Swish and Spit two times a day  buDESOnide   0.5 milliGRAM(s) Respule 0.5 milliGRAM(s) Inhalation every 12 hours  cholecalciferol 2000 Unit(s) Oral daily  dextrose 5%. 1000 milliLiter(s) (50 mL/Hr) IV Continuous <Continuous>  dextrose 50% Injectable 12.5 Gram(s) IV Push once  dextrose 50% Injectable 25 Gram(s) IV Push once  dextrose 50% Injectable 25 Gram(s) IV Push once  digoxin     Tablet 0.125 milliGRAM(s) Oral daily  digoxin  Injectable 0.25 milliGRAM(s) IV Push once  diltiazem Infusion 15 mG/Hr (15 mL/Hr) IV Continuous <Continuous>  docusate sodium 100 milliGRAM(s) Oral three times a day  furosemide    Tablet 40 milliGRAM(s) Oral daily  insulin glargine Injectable (LANTUS) 8 Unit(s) SubCutaneous at bedtime  insulin lispro (HumaLOG) corrective regimen sliding scale   SubCutaneous three times a day before meals  insulin lispro (HumaLOG) corrective regimen sliding scale   SubCutaneous at bedtime  insulin lispro Injectable (HumaLOG) 4 Unit(s) SubCutaneous before breakfast  insulin lispro Injectable (HumaLOG) 3 Unit(s) SubCutaneous with dinner  insulin lispro Injectable (HumaLOG) 4 Unit(s) SubCutaneous before lunch  isosorbide   mononitrate ER Tablet (IMDUR) 30 milliGRAM(s) Oral daily  melatonin 1 milliGRAM(s) Oral at bedtime  montelukast 10 milliGRAM(s) Oral daily  multivitamin 1 Tablet(s) Oral daily  nystatin    Suspension 533752 Unit(s) Oral four times a day  pantoprazole    Tablet 40 milliGRAM(s) Oral before breakfast  polyethylene glycol 3350 17 Gram(s) Oral daily  predniSONE   Tablet 15 milliGRAM(s) Oral daily  predniSONE   Tablet   Oral   senna 2 Tablet(s) Oral at bedtime  simethicone 80 milliGRAM(s) Chew once  theophylline ER Capsule 400 milliGRAM(s) Oral daily    MEDICATIONS  (PRN):  aluminum hydroxide/magnesium hydroxide/simethicone Suspension 30 milliLiter(s) Oral every 6 hours PRN Dyspepsia  bisacodyl Suppository 10 milliGRAM(s) Rectal daily PRN Constipation  dextrose 40% Gel 15 Gram(s) Oral once PRN Blood Glucose LESS THAN 70 milliGRAM(s)/deciliter  glucagon  Injectable 1 milliGRAM(s) IntraMuscular once PRN Glucose LESS THAN 70 milligrams/deciliter  ondansetron Injectable 4 milliGRAM(s) IV Push every 6 hours PRN Nausea and/or Vomiting  petrolatum Ophthalmic Ointment 1 Application(s) Both EYES at bedtime PRN dry eyes  sodium chloride 0.65% Nasal 1 Spray(s) Both Nostrils two times a day PRN Nasal Congestion      Vital Signs Last 24 Hrs  T(C): 37.1 (06 Jan 2019 05:02), Max: 37.1 (05 Jan 2019 16:42)  T(F): 98.8 (06 Jan 2019 05:02), Max: 98.8 (05 Jan 2019 16:42)  HR: 119 (06 Jan 2019 09:02) (89 - 152)  BP: 117/61 (06 Jan 2019 05:02) (94/61 - 132/79)  BP(mean): --  RR: 18 (06 Jan 2019 05:02) (18 - 20)  SpO2: 98% (06 Jan 2019 09:02) (97% - 100%)  CAPILLARY BLOOD GLUCOSE      POCT Blood Glucose.: 211 mg/dL (06 Jan 2019 08:07)  POCT Blood Glucose.: 144 mg/dL (05 Jan 2019 21:19)  POCT Blood Glucose.: 153 mg/dL (05 Jan 2019 17:24)  POCT Blood Glucose.: 232 mg/dL (05 Jan 2019 12:44)    I&O's Summary    05 Jan 2019 07:01  -  06 Jan 2019 07:00  --------------------------------------------------------  IN: 240 mL / OUT: 0 mL / NET: 240 mL    06 Jan 2019 07:01  -  06 Jan 2019 10:23  --------------------------------------------------------  IN: 340 mL / OUT: 0 mL / NET: 340 mL        PHYSICAL EXAM:  GENERAL: NAD, obese  HEAD:  Atraumatic, Normocephalic  EYES: EOMI, PERRLA, conjunctiva and sclera clear  NECK: Supple, No JVD  CHEST/LUNG: reduced lung sounds, no wheeze  HEART: Regular rate and rhythm; No murmurs, rubs, or gallops  ABDOMEN: Soft, Nontender, Nondistended; Bowel sounds present  EXTREMITIES:  2+ Peripheral Pulses, No clubbing, cyanosis. B/L edema in her feet with 2 blisters on her LLE on her dorsal foot/toes.   NEUROLOGY: AAOx3, non-focal  PSYCH: calm  SKIN: No rashes or lesions    LABS:                        10.2   7.3   )-----------( 331      ( 06 Jan 2019 07:19 )             30.4     01-06    137  |  88<L>  |  19  ----------------------------<  195<H>  3.8   |  36<H>  |  1.02    Ca    9.4      06 Jan 2019 07:19  Phos  3.8     01-05  Mg     1.9     01-05    TPro  6.4  /  Alb  3.8  /  TBili  0.3  /  DBili  x   /  AST  17  /  ALT  44  /  AlkPhos  52  01-05    PT/INR - ( 05 Jan 2019 13:07 )   PT: 10.5 sec;   INR: 0.92 ratio         PTT - ( 05 Jan 2019 13:07 )  PTT:28.7 sec          Consultant(s) Notes Reviewed:  pulm, EPS, cardiology    Care Discussed with Consultants/Other Providers: d/w cardiology - c/w cardizem gtt, dig load and monitor on tele    Case Discussed with Family: d/w brother - agrees with plan

## 2019-01-06 NOTE — PROGRESS NOTE ADULT - PROBLEM SELECTOR PLAN 8
Echo with  mild diastolic dysfunction, no significant changes from prior study in 2014.  lasix 40mg qd

## 2019-01-06 NOTE — PROGRESS NOTE ADULT - ASSESSMENT
57 y/o F w/h/o controlled T2DM on Metformin 500mg bid. Also h/o CAD and COPD now on prednisone 15 mg daily. Pt's BG are above goal for last 24 hours. Pt had episodes of hypoglycemia few days before so will up titrate insulin regimen conservatively.

## 2019-01-06 NOTE — PROGRESS NOTE ADULT - PROBLEM SELECTOR PLAN 2
Prednisone taper per pulm   Continue Pulmicort, Duoneb ATC   c/w Theophylline, Singulair.  Completed 7 days of biaxin   Home Trilogy vent arranged by pulmonary.  c/w intermittent bipap ; alternating with 5L NC  C/w daliresp  cw azithromycin   Plan for discharge to acute pulmonary rehab  Appreciate pm&r recs  ENT evaluated, pt s/p laryngoscope wnl as part of preopr workup prior to transplant   Pulm coordinating lung transplant at [facility] but will likely need pulm rehab first

## 2019-01-06 NOTE — PROGRESS NOTE ADULT - SUBJECTIVE AND OBJECTIVE BOX
Chief Complaint/Follow-up on:     Subjective:    MEDICATIONS  (STANDING):  amiodarone    Tablet 400 milliGRAM(s) Oral two times a day  apixaban 5 milliGRAM(s) Oral every 12 hours  artificial tears (preservative free) Ophthalmic Solution 1 Drop(s) Both EYES three times a day  aspirin enteric coated 81 milliGRAM(s) Oral daily  azithromycin   Tablet 250 milliGRAM(s) Oral <User Schedule>  Biotene Dry Mouth Oral Rinse 5 milliLiter(s) Swish and Spit two times a day  buDESOnide   0.5 milliGRAM(s) Respule 0.5 milliGRAM(s) Inhalation every 12 hours  cholecalciferol 2000 Unit(s) Oral daily  dextrose 5%. 1000 milliLiter(s) (50 mL/Hr) IV Continuous <Continuous>  dextrose 50% Injectable 12.5 Gram(s) IV Push once  dextrose 50% Injectable 25 Gram(s) IV Push once  dextrose 50% Injectable 25 Gram(s) IV Push once  digoxin     Tablet 0.125 milliGRAM(s) Oral daily  digoxin  Injectable 0.25 milliGRAM(s) IV Push once  docusate sodium 100 milliGRAM(s) Oral three times a day  furosemide    Tablet 40 milliGRAM(s) Oral daily  insulin glargine Injectable (LANTUS) 8 Unit(s) SubCutaneous at bedtime  insulin lispro (HumaLOG) corrective regimen sliding scale   SubCutaneous three times a day before meals  insulin lispro (HumaLOG) corrective regimen sliding scale   SubCutaneous at bedtime  insulin lispro Injectable (HumaLOG) 4 Unit(s) SubCutaneous before breakfast  insulin lispro Injectable (HumaLOG) 3 Unit(s) SubCutaneous with dinner  insulin lispro Injectable (HumaLOG) 4 Unit(s) SubCutaneous before lunch  ipratropium    for Nebulization 500 MICROGram(s) Nebulizer every 6 hours  isosorbide   mononitrate ER Tablet (IMDUR) 30 milliGRAM(s) Oral daily  levalbuterol Inhalation 0.63 milliGRAM(s) Inhalation every 6 hours  melatonin 1 milliGRAM(s) Oral at bedtime  metoprolol tartrate Injectable 5 milliGRAM(s) IV Push once  metoprolol tartrate Injectable 5 milliGRAM(s) IV Push every 6 hours  montelukast 10 milliGRAM(s) Oral daily  multivitamin 1 Tablet(s) Oral daily  nystatin    Suspension 158361 Unit(s) Oral four times a day  pantoprazole    Tablet 40 milliGRAM(s) Oral before breakfast  polyethylene glycol 3350 17 Gram(s) Oral daily  predniSONE   Tablet 15 milliGRAM(s) Oral daily  predniSONE   Tablet   Oral   senna 2 Tablet(s) Oral at bedtime  simethicone 80 milliGRAM(s) Chew once  theophylline ER Capsule 400 milliGRAM(s) Oral daily    MEDICATIONS  (PRN):  aluminum hydroxide/magnesium hydroxide/simethicone Suspension 30 milliLiter(s) Oral every 6 hours PRN Dyspepsia  bisacodyl Suppository 10 milliGRAM(s) Rectal daily PRN Constipation  dextrose 40% Gel 15 Gram(s) Oral once PRN Blood Glucose LESS THAN 70 milliGRAM(s)/deciliter  glucagon  Injectable 1 milliGRAM(s) IntraMuscular once PRN Glucose LESS THAN 70 milligrams/deciliter  ondansetron Injectable 4 milliGRAM(s) IV Push every 6 hours PRN Nausea and/or Vomiting  petrolatum Ophthalmic Ointment 1 Application(s) Both EYES at bedtime PRN dry eyes  sodium chloride 0.65% Nasal 1 Spray(s) Both Nostrils two times a day PRN Nasal Congestion      PHYSICAL EXAM:  VITALS: T(C): 36.2 (01-06-19 @ 13:54)  T(F): 97.2 (01-06-19 @ 13:54), Max: 98.8 (01-05-19 @ 16:42)  HR: 120 (01-06-19 @ 13:54) (78 - 155)  BP: 117/87 (01-06-19 @ 13:54) (94/61 - 139/75)  RR:  (18 - 20)  SpO2:  (97% - 100%)  Wt(kg): --  GENERAL: NAD, well-groomed, well-developed  RESPIRATORY: Clear to auscultation bilaterally; No rales, rhonchi, wheezing, or rubs  CARDIOVASCULAR: Regular rate and rhythm; No murmurs; no peripheral edema  GI: Soft, nontender, non distended, normal bowel sounds  Ext - no cyanosis, no clubbing    POCT Blood Glucose.: 236 mg/dL (01-06-19 @ 13:47)  POCT Blood Glucose.: 230 mg/dL (01-06-19 @ 12:12)  POCT Blood Glucose.: 211 mg/dL (01-06-19 @ 08:07)  POCT Blood Glucose.: 144 mg/dL (01-05-19 @ 21:19)  POCT Blood Glucose.: 153 mg/dL (01-05-19 @ 17:24)  POCT Blood Glucose.: 232 mg/dL (01-05-19 @ 12:44)  POCT Blood Glucose.: 234 mg/dL (01-05-19 @ 08:48)  POCT Blood Glucose.: 187 mg/dL (01-04-19 @ 22:08)  POCT Blood Glucose.: 256 mg/dL (01-04-19 @ 17:44)  POCT Blood Glucose.: 77 mg/dL (01-04-19 @ 12:59)  POCT Blood Glucose.: 225 mg/dL (01-04-19 @ 08:53)  POCT Blood Glucose.: 89 mg/dL (01-03-19 @ 22:04)  POCT Blood Glucose.: 211 mg/dL (01-03-19 @ 17:25)    01-06    137  |  88<L>  |  19  ----------------------------<  195<H>  3.8   |  36<H>  |  1.02    EGFR if : 69  EGFR if non : 60    Ca    9.4      01-06  Mg     1.9     01-05  Phos  3.8     01-05    TPro  6.4  /  Alb  3.8  /  TBili  0.3  /  DBili  x   /  AST  17  /  ALT  44  /  AlkPhos  52  01-05      Hemoglobin A1C, Whole Blood: 7.2 % <H> [4.0 - 5.6] (11-30-18 @ 07:58) Chief Complaint/Follow-up on:   T2DM on tapering dose of steroids    Subjective:  Patient was seen and examined at bedside, Pt appears anxious, denies any major complaints. her BG are above goal for last one day. She is currently on 15 mg of prednisone until 8th jan 2019 then will start 10 mg prednisone.     MEDICATIONS  (STANDING):  amiodarone    Tablet 400 milliGRAM(s) Oral two times a day  apixaban 5 milliGRAM(s) Oral every 12 hours  artificial tears (preservative free) Ophthalmic Solution 1 Drop(s) Both EYES three times a day  aspirin enteric coated 81 milliGRAM(s) Oral daily  azithromycin   Tablet 250 milliGRAM(s) Oral <User Schedule>  Biotene Dry Mouth Oral Rinse 5 milliLiter(s) Swish and Spit two times a day  buDESOnide   0.5 milliGRAM(s) Respule 0.5 milliGRAM(s) Inhalation every 12 hours  cholecalciferol 2000 Unit(s) Oral daily  dextrose 5%. 1000 milliLiter(s) (50 mL/Hr) IV Continuous <Continuous>  dextrose 50% Injectable 12.5 Gram(s) IV Push once  dextrose 50% Injectable 25 Gram(s) IV Push once  dextrose 50% Injectable 25 Gram(s) IV Push once  digoxin     Tablet 0.125 milliGRAM(s) Oral daily  digoxin  Injectable 0.25 milliGRAM(s) IV Push once  docusate sodium 100 milliGRAM(s) Oral three times a day  furosemide    Tablet 40 milliGRAM(s) Oral daily  insulin glargine Injectable (LANTUS) 8 Unit(s) SubCutaneous at bedtime  insulin lispro (HumaLOG) corrective regimen sliding scale   SubCutaneous three times a day before meals  insulin lispro (HumaLOG) corrective regimen sliding scale   SubCutaneous at bedtime  insulin lispro Injectable (HumaLOG) 4 Unit(s) SubCutaneous before breakfast  insulin lispro Injectable (HumaLOG) 3 Unit(s) SubCutaneous with dinner  insulin lispro Injectable (HumaLOG) 4 Unit(s) SubCutaneous before lunch  ipratropium    for Nebulization 500 MICROGram(s) Nebulizer every 6 hours  isosorbide   mononitrate ER Tablet (IMDUR) 30 milliGRAM(s) Oral daily  levalbuterol Inhalation 0.63 milliGRAM(s) Inhalation every 6 hours  melatonin 1 milliGRAM(s) Oral at bedtime  metoprolol tartrate Injectable 5 milliGRAM(s) IV Push once  metoprolol tartrate Injectable 5 milliGRAM(s) IV Push every 6 hours  montelukast 10 milliGRAM(s) Oral daily  multivitamin 1 Tablet(s) Oral daily  nystatin    Suspension [id number] Unit(s) Oral four times a day  pantoprazole    Tablet 40 milliGRAM(s) Oral before breakfast  polyethylene glycol 3350 17 Gram(s) Oral daily  predniSONE   Tablet 15 milliGRAM(s) Oral daily  predniSONE   Tablet   Oral   senna 2 Tablet(s) Oral at bedtime  simethicone 80 milliGRAM(s) Chew once  theophylline ER Capsule 400 milliGRAM(s) Oral daily    MEDICATIONS  (PRN):  aluminum hydroxide/magnesium hydroxide/simethicone Suspension 30 milliLiter(s) Oral every 6 hours PRN Dyspepsia  bisacodyl Suppository 10 milliGRAM(s) Rectal daily PRN Constipation  dextrose 40% Gel 15 Gram(s) Oral once PRN Blood Glucose LESS THAN 70 milliGRAM(s)/deciliter  glucagon  Injectable 1 milliGRAM(s) IntraMuscular once PRN Glucose LESS THAN 70 milligrams/deciliter  ondansetron Injectable 4 milliGRAM(s) IV Push every 6 hours PRN Nausea and/or Vomiting  petrolatum Ophthalmic Ointment 1 Application(s) Both EYES at bedtime PRN dry eyes  sodium chloride 0.65% Nasal 1 Spray(s) Both Nostrils two times a day PRN Nasal Congestion      PHYSICAL EXAM:  VITALS: T(C): 36.2 (01-06-19 @ 13:54)  T(F): 97.2 (01-06-19 @ 13:54), Max: 98.8 (01-05-19 @ 16:42)  HR: 120 (01-06-19 @ 13:54) (78 - 155)  BP: 117/87 (01-06-19 @ 13:54) (94/61 - 139/75)  RR:  (18 - 20)  SpO2:  (97% - 100%)  GENERAL: NAD, well-groomed, well-developed  RESPIRATORY: mild wheezing on b/l Lower lobes more on right lung, No rales, rhonchi, or rubs  CARDIOVASCULAR: Tachycardia No murmurs; no peripheral edema  GI: Soft, nontender, non distended, normal bowel sounds  Ext - no cyanosis, no clubbing    POCT Blood Glucose.: 236 mg/dL (01-06-19 @ 13:47)  POCT Blood Glucose.: 230 mg/dL (01-06-19 @ 12:12)  POCT Blood Glucose.: 211 mg/dL (01-06-19 @ 08:07)  POCT Blood Glucose.: 144 mg/dL (01-05-19 @ 21:19)  POCT Blood Glucose.: 153 mg/dL (01-05-19 @ 17:24)  POCT Blood Glucose.: 232 mg/dL (01-05-19 @ 12:44)  POCT Blood Glucose.: 234 mg/dL (01-05-19 @ 08:48)  POCT Blood Glucose.: 187 mg/dL (01-04-19 @ 22:08)  POCT Blood Glucose.: 256 mg/dL (01-04-19 @ 17:44)  POCT Blood Glucose.: 77 mg/dL (01-04-19 @ 12:59)  POCT Blood Glucose.: 225 mg/dL (01-04-19 @ 08:53)  POCT Blood Glucose.: 89 mg/dL (01-03-19 @ 22:04)  POCT Blood Glucose.: 211 mg/dL (01-03-19 @ 17:25)    01-06    137  |  88<L>  |  19  ----------------------------<  195<H>  3.8   |  36<H>  |  1.02    EGFR if : 69  EGFR if non : 60    Ca    9.4      01-06  Mg     1.9     01-05  Phos  3.8     01-05    TPro  6.4  /  Alb  3.8  /  TBili  0.3  /  DBili  x   /  AST  17  /  ALT  44  /  AlkPhos  52  01-05      Hemoglobin A1C, Whole Blood: 7.2 % <H> [4.0 - 5.6] (11-30-18 @ 07:58)

## 2019-01-06 NOTE — PROGRESS NOTE ADULT - SUBJECTIVE AND OBJECTIVE BOX
[facility] - [facility]     PROGRESS NOTE    CHIEF COMPLAINT: chronic hypoxic/hypercapnic respiratory failure; COPD exacerbation; emphysema; dyspnea; obesity; atrial fibrillation with RVR    INTERVAL HISTORY: vague chest discomfort with tightness yesterday afternoon -> found to be in atrial fibrillation with RVR which continues despite multiple medical interventions; transferred to telemetry ramirez; for DCCV if remains in atrial fibrillation tomorrow; slept in bed with BIPAP; off BIPAP all day yesterday without shortness of breath on a nasal canula @ 4lpm; ABG with improved hypercapnia without acidemia; arm swelling nearly resolved on diuretics and reduced dose of steroids; resolved AURORA, hyperkalemia and hyponatremia; no cough, sputum production, chest congestion or wheeze; no fevers, chills or sweats; no chest pain/pressure or palpitations; decreased lower extremity swelling in bed with ACE bandages    REVIEW OF SYSTEMS:  Constitutional: As per interval history  HEENT: Within normal limits  CV: As per interval history  Resp: As per interval history  GI: Within normal limits   : Within normal limits  Musculoskeletal: improving lower extremity weakness   Skin: Within normal limits  Neurological: Within normal limits  Psychiatric: frustrated   Endocrine: Within normal limits  Hematologic/Lymphatic: Within normal limits  Allergic/Immunologic: Within normal limits    MEDICATIONS:     Pulmonary "  ALBUTerol/ipratropium for Nebulization 3 milliLiter(s) Nebulizer <User Schedule>  buDESOnide   0.5 milliGRAM(s) Respule 0.5 milliGRAM(s) Inhalation every 12 hours  montelukast 10 milliGRAM(s) Oral daily  theophylline ER Capsule 400 milliGRAM(s) Oral daily      Anti-microbials:  azithromycin   Tablet 250 milliGRAM(s) Oral <User Schedule>  nystatin    Suspension 328240 Unit(s) Oral four times a day      Cardiovascular:  amiodarone    Tablet 400 milliGRAM(s) Oral two times a day  digoxin     Tablet 0.125 milliGRAM(s) Oral daily  digoxin  Injectable 0.25 milliGRAM(s) IV Push once  furosemide    Tablet 40 milliGRAM(s) Oral daily  isosorbide   mononitrate ER Tablet (IMDUR) 30 milliGRAM(s) Oral daily      Other:  aluminum hydroxide/magnesium hydroxide/simethicone Suspension 30 milliLiter(s) Oral every 6 hours PRN  apixaban 5 milliGRAM(s) Oral every 12 hours  artificial tears (preservative free) Ophthalmic Solution 1 Drop(s) Both EYES three times a day  aspirin enteric coated 81 milliGRAM(s) Oral daily  Biotene Dry Mouth Oral Rinse 5 milliLiter(s) Swish and Spit two times a day  bisacodyl Suppository 10 milliGRAM(s) Rectal daily PRN  cholecalciferol 2000 Unit(s) Oral daily  dextrose 40% Gel 15 Gram(s) Oral once PRN  dextrose 5%. 1000 milliLiter(s) IV Continuous <Continuous>  dextrose 50% Injectable 12.5 Gram(s) IV Push once  dextrose 50% Injectable 25 Gram(s) IV Push once  dextrose 50% Injectable 25 Gram(s) IV Push once  docusate sodium 100 milliGRAM(s) Oral three times a day  glucagon  Injectable 1 milliGRAM(s) IntraMuscular once PRN  insulin glargine Injectable (LANTUS) 8 Unit(s) SubCutaneous at bedtime  insulin lispro (HumaLOG) corrective regimen sliding scale   SubCutaneous three times a day before meals  insulin lispro (HumaLOG) corrective regimen sliding scale   SubCutaneous at bedtime  insulin lispro Injectable (HumaLOG) 4 Unit(s) SubCutaneous before breakfast  insulin lispro Injectable (HumaLOG) 3 Unit(s) SubCutaneous with dinner  insulin lispro Injectable (HumaLOG) 4 Unit(s) SubCutaneous before lunch  melatonin 1 milliGRAM(s) Oral at bedtime  multivitamin 1 Tablet(s) Oral daily  ondansetron Injectable 4 milliGRAM(s) IV Push every 6 hours PRN  pantoprazole    Tablet 40 milliGRAM(s) Oral before breakfast  petrolatum Ophthalmic Ointment 1 Application(s) Both EYES at bedtime PRN  polyethylene glycol 3350 17 Gram(s) Oral daily  predniSONE   Tablet 15 milliGRAM(s) Oral daily  predniSONE   Tablet   Oral   senna 2 Tablet(s) Oral at bedtime  simethicone 80 milliGRAM(s) Chew once  sodium chloride 0.65% Nasal 1 Spray(s) Both Nostrils two times a day PRN        OBJECTIVE:    I&O's Detail    05 Jan 2019 07:01  -  06 Jan 2019 07:00  --------------------------------------------------------  IN:    Oral Fluid: 240 mL  Total IN: 240 mL    OUT:  Total OUT: 0 mL    Total NET: 240 mL      06 Jan 2019 07:01  -  06 Jan 2019 13:21  --------------------------------------------------------  IN:    Oral Fluid: 340 mL  Total IN: 340 mL    OUT:  Total OUT: 0 mL    Total NET: 340 mL    POCT Blood Glucose.: 230 mg/dL (06 Jan 2019 12:12)  POCT Blood Glucose.: 211 mg/dL (06 Jan 2019 08:07)  POCT Blood Glucose.: 144 mg/dL (05 Jan 2019 21:19)  POCT Blood Glucose.: 153 mg/dL (05 Jan 2019 17:24)      PHYSICAL EXAM:       ICU Vital Signs Last 24 Hrs  T(C): 37.1 (06 Jan 2019 05:02), Max: 37.1 (05 Jan 2019 16:42)  T(F): 98.8 (06 Jan 2019 05:02), Max: 98.8 (05 Jan 2019 16:42)  HR: 100 (06 Jan 2019 12:49) (78 - 155)  BP: 139/75 (06 Jan 2019 12:49) (94/61 - 139/75)  BP(mean): --  ABP: --  ABP(mean): --  RR: 18 (06 Jan 2019 05:02) (18 - 20)  SpO2: 98% (06 Jan 2019 09:02) (97% - 100%) on 4lpm nasal canula    General: Awake. Alert. Cooperative. No distress. Appears stated age. Obese. Cushingoid.  HEENT:  Atraumatic. Moonlike facies. Anicteric. Normal oral mucosa. PERRL. EOMI.   Neck: Supple. Trachea midline. Thyroid without enlargement/tenderness/nodules. No carotid bruit. No JVD.	  Cardiovascular: Irregularly irregular rate and rhythm. Tachycardic. Distant S1 S2. No murmurs, rubs or gallops.  Respiratory: Respirations unlabored. Markedly decreased breath sounds throughout. No wheeze. No curvature.  Abdomen: Soft. Non-tender. Non-distended. No organomegaly. No masses. Normal bowel sounds. Obese.  Extremities: Warm to touch. No clubbing or cyanosis. Mild bilateral lower extremity edema up to the thigh. Legs wrapped with ACE bandages. Upper extremity swelling much improved.  Pulses: Decreased lower extremity peripheral pulses.  Skin: No rashes or lesions. No ecchymoses. No cyanosis. Warm to touch.   Lymph Nodes: Cervical, supraclavicular and axillary nodes normal  Neurological: Motor and sensory examination equal and normal. A and O x 3  Psychiatry: Appropriate mood and affect.         LABS:                        10.2   7.3   )-----------( 331      ( 06 Jan 2019 07:19 )             30.4                         9.9    5.65  )-----------( 290      ( 05 Jan 2019 15:01 )             32.3     01-06    137  |  88<L>  |  19  ----------------------------<  195<H>  3.8   |  36<H>  |  1.02    01-05    137  |  89<L>  |  22  ----------------------------<  230<H>  4.4   |  36<H>  |  1.12    Ca      9.4      01-06    Ca      9.1      01-05    Phos    3.8     01-05    Phos    3.7     01-03      Mg       1.9     01-05    Mg       2.1     01-03    TPro  6.4  /  Alb  3.8  /  TBili  0.3  /  DBili  x   /  AST  17  /  ALT  44  /  AlkPhos  52  01-05    TPro  6.0  /  Alb  3.6  /  TBili  0.4  /  DBili  x   /  AST  28  /  ALT  43  /  AlkPhos  53  01-03    PT/INR - ( 05 Jan 2019 13:07 )   PT: 10.5 sec;   INR: 0.92 ratio       PTT - ( 05 Jan 2019 13:07 )  PTT: 28.7 sec    ABG - ( 06 Jan 2019 06:44 )  pH: 7.39  /  pCO2: 68    /  pO2: 99    / HCO3: 40    / Base Excess: 13.3  /  SaO2: 98        ABG - ( 03 Jan 2019 06:40 )  pH: 7.44  /  pCO2: 65    /  pO2: 152   / HCO3: 43    / Base Excess: 15.9  /  SaO2: 97        ABG - ( 01 Jan 2019 03:44 )  pH: 7.42  /  pCO2: 69    /  pO2: 165   / HCO3: 44    / Base Excess: 17.3  /  SaO2: 97        ABG - ( 31 Dec 2018 16:19 )  pH: 7.38  /  pCO2: 84    /  pO2: 26    / HCO3: 48    / Base Excess: 20.2  /  SaO2: 41        ABG - ( 18 Dec 2018 13:23 )  pH: 7.46  /  pCO2: 47    /  pO2: 88    / HCO3: 33    / Base Excess: 8.0   /  SaO2: 97        ABG - ( 16 Dec 2018 20:53 )  pH: 7.44  /  pCO2: 53    /  pO2: 173   / HCO3: 36    / Base Excess: 10.0  /  SaO2: 100       ABG - ( 13 Dec 2018 06:29 )  pH: 7.30  /  pCO2: 71    /  pO2: 182   / HCO3: 34    / Base Excess: 5.8   /  SaO2: 99        ABG - ( 13 Dec 2018 00:59 )  pH: 7.32  /  pCO2: 71    /  pO2: 75    / HCO3: 35    / Base Excess: 7.1   /  SaO2: 95        ABG - ( 11 Dec 2018 11:45 )  pH: 7.39  /  pCO2: 54    /  pO2: 98    / HCO3: 32    / Base Excess: 6.2   /  SaO2: 98        ABG - ( 09 Dec 2018 11:26 )  pH: 7.35  /  pCO2: 63    /  pO2: 145   / HCO3: 34    / Base Excess: 6.6   /  SaO2: 99      ABG - ( 09 Dec 2018 00:28 )  pH: 7.32  /  pCO2: 71    /  pO2: 124   / HCO3: 36    / Base Excess: 7.6   /  SaO2: 99        ABG - ( 08 Dec 2018 20:50 )  pH: 7.32  /  pCO2: 72    /  pO2: 80    / HCO3: 36    / Base Excess: 7.7   /  SaO2: 95        Serum Pro-Brain Natriuretic Peptide: 254 pg/mL (12-13 @ 14:00)    < from: Transthoracic Echocardiogram (12.07.18 @ 08:59) >    Patient name: GUY HARTMAN  YOB: 1960   Age: 58 (F)   MR#: [id number]  Study Date: 12/7/2018  Location: [id number]: [facility]  Study quality: Technically good  Referring Physician: Allen ingram MD  BloodPressure: 120/80 mmHg  Height: 163 cm  Weight: 88 kg  BSA: 1.9 m2  ------------------------------------------------------------------------  PROCEDURE: Transthoracic echocardiogram with 2-D, M-Mode  and complete spectral and color flow Doppler.  INDICATION: Other forms of dyspnea (R06.09)  ------------------------------------------------------------------------  Dimensions:    Normal Values:  LA:     3.6    2.0 - 4.0 cm  Ao:     2.9    2.0 - 3.8 cm  SEPTUM: 0.9    0.6 - 1.2 cm  PWT:    0.8    0.6- 1.1 cm  LVIDd:  4.9    3.0 - 5.6 cm  LVIDs:  2.9    1.8 - 4.0 cm  Derived variables:  LVMI: 73 g/m2  RWT: 0.32  Fractional short: 40 %  EF (Teicholtz): 71 %  Doppler Peak Velocity (m/sec): AoV=1.2  ------------------------------------------------------------------------  Observations:  Mitral Valve: Normal mitral valve.  Aortic Valve/Aorta: Normal trileaflet aortic valve. Peak  transaortic valve gradient equals 6 mm Hg, mean transaortic  valve gradient equals 3 mm Hg, aortic valve velocity time  integral equals 22 cm. Trace aortic regurgitation.  Aortic Root: 2.9 cm.  Left Atrium: Normal left atrium.  LA volume index = 18  cc/m2.  Left Ventricle: Normal left ventricular systolic function.  No segmental wall motion abnormalities. Normal left  ventricular internal dimensions and wall thicknesses. Mild  diastolic dysfunction (Stage I).  Right Heart: Normal right atrium. Normal right ventricular  size and function. Normal tricuspid valve. Minimal  tricuspid regurgitation. Normal pulmonic valve.  Pericardium/Pleura: Normal pericardium with no pericardial  effusion.  Hemodynamic: Estimated right atrial pressure is 8 mm Hg.  Inadequate tricuspid regurgitation Doppler envelope  precludes estimation of RVSP.  ------------------------------------------------------------------------  Conclusions:  1. Normal mitral valve.  2. Normal trileaflet aortic valve. Peak transaortic valve  gradient equals 6 mm Hg, mean transaortic valve gradient  equals 3 mm Hg, aortic valve velocity time integral equals  22 cm. Trace aortic regurgitation.  3. Aortic Root: 2.9 cm.  4. Normal left atrium.  LA volume index = 18 cc/m2.  5. Normal left ventricular internal dimensions and wall  thicknesses.  6. Normal left ventricular systolic function. No segmental  wall motion abnormalities.  7. Mild diastolic dysfunction (Stage I).  8. Normal right ventricular size and function.  9. Inadequate tricuspid regurgitation Doppler envelope  precludes estimation of RVSP.  10. Normal tricuspid valve. Minimal tricuspid  regurgitation.  *** Compared with echocardiogram report of 2/18/2014, no  significant changes noted.  ------------------------------------------------------------------------  Confirmed on  12/7/2018 - 13:49:43 by Shefali Gómez M.D.  ------------------------------------------------------------------------    < end of copied text >  ------------------------------------------------------------------------------------------------  MICROBIOLOGY:     Culture - Sputum . (12.07.18 @ 08:34)    Gram Stain:   Rare polymorphonuclear leukocytes per low power field  Rare Squamous epithelial cells per low power field  Numerous Gram Positive Cocci in Pairs and Chains per oil power field    Specimen Source: .Sputum Sputum    Culture Results:   Normal Respiratory Samaria present    Culture - Sputum . (12.05.18 @ 22:50)    Gram Stain:   Moderate polymorphonuclear leukocytes per low power field  Few Squamous epithelial cells per low power field  Moderate Gram Positive Cocci in Pairs and Chains per oil power field    Specimen Source: .Sputum Sputum    Culture Results:   Normal Respiratory Samaria present    Rapid Respiratory Viral Panel (11.30.18 @ 01:30)    Rapid RVP Result: NotDete: The Film[employer] RVP Rapid uses polymerase chain reaction (PCR) and melt  curve analysis to screen for adenovirus; coronavirus HKU1, NL63, 229E,  OC43; human metapneumovirus (hMPV); human enterovirus/rhinovirus  (Entero/RV); influenza A; influenza A/H1;influenza A/H3; influenza  A/H1-2009; influenza B; parainfluenza viruses 1, 2, 3, 4; respiratory  syncytial virus; Bordetella pertussis; Mycoplasma pneumoniae; and  Chlamydophila pneumoniae.    RADIOLOGY:  [x] Chest radiographs reviewed and interpreted by me    < from: [facility] Duplex Lower Ext Vein Scan, Bilat (12.30.18 @ 19:06) >    EXAM:  DUPLEX SCAN EXT VEINS LOWER BI                          PROCEDURE DATE:  12/30/2018      INTERPRETATION:  Clinical indication: Worsening edema.    Technique:  Grayscale, color Doppler and spectral Doppler ultrasound was   utilized toevaluate bilateral lower extremity deep venous system.      Comparison: 12/6/2018.    Findings: There is no thrombosis in bilateral common femoral veins,   femoral veins or popliteal veins. Visualized calf veins are patent. There   is bilateral lowerextremity soft tissue edema.    Impression:     No evidence of deep vein thrombosis in either lower extremity.    BROCK TOBIN M.D., ATTENDING RADIOLOGIST  This document has been electronically signed. Dec 30 2018  7:24PM     < end of copied text >  ---------------------------------------------------------------------------------------------------------------  < from: [facility] Duplex Upper Ext Vein Scan, Darius (12.30.18 @ 19:05) >    EXAM:  DUPLEX SCAN EXT VEINS UPPER BI                          PROCEDURE DATE:  12/30/2018      INTERPRETATION:  Clinical information: Worsening bilateral upper   extremity edema.    Findings: Duplex evaluation of the deep venous system of the right and   left upper extremity was performed to include the brachiocephalic,   internal jugular, subclavian, axillary, brachial, radial and ulnar veins.   No echogenic thrombus is seen within the vein lumina. Complete coaptation   of those segments of vein accessible to compression was achieved.   Spontaneous venous flow with respiratory and cardiac pulsatility is noted   throughout.     The right innominate vein is patent. The left innominate vein was not   visualized.     The right and left basilic and cephalic veins are patent and compressible.    Impression: No duplex evidence of DVT in the right and left upper   extremity.    RAYMUNDO DUMONT M.D., ATTENDING RADIOLOGIST  This document has been electronically signed. Dec 31 2018  8:49AM     < end of copied text >  ---------------------------------------------------------------------------------------------------------------  < from: Xray Chest 1 View AP/PA (12.25.18 @ 18:04) >    EXAM:  XR CHEST AP OR PA 1V                          PROCEDURE DATE:  12/25/2018      INTERPRETATION:  CLINICAL INFORMATION: Shortness of breath. History of   COPD.    TECHNIQUE: AP view of the chest 12/25/2018.    COMPARISON: Chest radiograph 12/13/2018.     FINDINGS:     The lungs are clear. There is no pneumothorax and no pleural effusions.   Cardiac size is not accurately evaluated in this projection.  The visualized osseous structures demonstrate no acute abnormality.    IMPRESSION:   Clear lungs.    SUSANA HODGES M.D., RADIOLOGY RESIDENT  This document has been electronically signed.  SVETLANA SANDOVAL M.D., ATTENDING RADIOLOGIST  This document has been electronically signed. Dec 26 2018 10:59AM      < end of copied text >  ---------------------------------------------------------------------------------------------------------------  < from: CT Angio Chest w/ IV Cont (12.04.18 @ 16:30) >    EXAM:  CT ANGIO CHEST (W)AW IC                          PROCEDURE DATE:  12/04/2018      INTERPRETATION:  CT CHEST WITH CONTRAST    INDICATION: Known COPD not responding to steroids and bronchodilators.   Progressively worsening dyspnea. Evaluate for pulmonary embolism.    IMPRESSION:   1.  No pulmonary embolism.  2. Emphysematous changes of the lung.    DIANA MEDINA M.D., RADIOLOGY RESIDENT  This document has been electronically signed.  JEYSON SANCHEZ M.D., ATTENDING RADIOLOGIST  This document has been electronically signed. Dec  4 2018  5:21PM      < end of copied text >  ---------------------------------------------------------------------------------------------------------------  SPIROMETRY:     FEV1 0.56 liters - 22% predicted  FVC 1.73 liters - 52% predicted  FEV1% 32

## 2019-01-07 DIAGNOSIS — I48.91 UNSPECIFIED ATRIAL FIBRILLATION: ICD-10-CM

## 2019-01-07 LAB
ANION GAP SERPL CALC-SCNC: 12 MMOL/L — SIGNIFICANT CHANGE UP (ref 5–17)
BUN SERPL-MCNC: 23 MG/DL — SIGNIFICANT CHANGE UP (ref 7–23)
CALCIUM SERPL-MCNC: 9.6 MG/DL — SIGNIFICANT CHANGE UP (ref 8.4–10.5)
CHLORIDE SERPL-SCNC: 88 MMOL/L — LOW (ref 96–108)
CO2 SERPL-SCNC: 39 MMOL/L — HIGH (ref 22–31)
CREAT SERPL-MCNC: 1.16 MG/DL — SIGNIFICANT CHANGE UP (ref 0.5–1.3)
GLUCOSE BLDC GLUCOMTR-MCNC: 181 MG/DL — HIGH (ref 70–99)
GLUCOSE BLDC GLUCOMTR-MCNC: 181 MG/DL — HIGH (ref 70–99)
GLUCOSE BLDC GLUCOMTR-MCNC: 230 MG/DL — HIGH (ref 70–99)
GLUCOSE BLDC GLUCOMTR-MCNC: 240 MG/DL — HIGH (ref 70–99)
GLUCOSE BLDC GLUCOMTR-MCNC: 81 MG/DL — SIGNIFICANT CHANGE UP (ref 70–99)
GLUCOSE SERPL-MCNC: 169 MG/DL — HIGH (ref 70–99)
HCT VFR BLD CALC: 32.1 % — LOW (ref 34.5–45)
HGB BLD-MCNC: 10.9 G/DL — LOW (ref 11.5–15.5)
MAGNESIUM SERPL-MCNC: 2.4 MG/DL — SIGNIFICANT CHANGE UP (ref 1.6–2.6)
MCHC RBC-ENTMCNC: 30.3 PG — SIGNIFICANT CHANGE UP (ref 27–34)
MCHC RBC-ENTMCNC: 34.1 GM/DL — SIGNIFICANT CHANGE UP (ref 32–36)
MCV RBC AUTO: 89 FL — SIGNIFICANT CHANGE UP (ref 80–100)
PLATELET # BLD AUTO: 352 K/UL — SIGNIFICANT CHANGE UP (ref 150–400)
POTASSIUM SERPL-MCNC: 3.9 MMOL/L — SIGNIFICANT CHANGE UP (ref 3.5–5.3)
POTASSIUM SERPL-SCNC: 3.9 MMOL/L — SIGNIFICANT CHANGE UP (ref 3.5–5.3)
RBC # BLD: 3.61 M/UL — LOW (ref 3.8–5.2)
RBC # FLD: 14.9 % — HIGH (ref 10.3–14.5)
SODIUM SERPL-SCNC: 139 MMOL/L — SIGNIFICANT CHANGE UP (ref 135–145)
WBC # BLD: 8.6 K/UL — SIGNIFICANT CHANGE UP (ref 3.8–10.5)
WBC # FLD AUTO: 8.6 K/UL — SIGNIFICANT CHANGE UP (ref 3.8–10.5)

## 2019-01-07 PROCEDURE — 93312 ECHO TRANSESOPHAGEAL: CPT | Mod: 26

## 2019-01-07 PROCEDURE — 93325 DOPPLER ECHO COLOR FLOW MAPG: CPT | Mod: 26

## 2019-01-07 PROCEDURE — 93320 DOPPLER ECHO COMPLETE: CPT | Mod: 26

## 2019-01-07 PROCEDURE — 99233 SBSQ HOSP IP/OBS HIGH 50: CPT

## 2019-01-07 PROCEDURE — 99232 SBSQ HOSP IP/OBS MODERATE 35: CPT

## 2019-01-07 PROCEDURE — 93010 ELECTROCARDIOGRAM REPORT: CPT | Mod: 76

## 2019-01-07 RX ORDER — METOPROLOL TARTRATE 50 MG
5 TABLET ORAL ONCE
Qty: 0 | Refills: 0 | Status: COMPLETED | OUTPATIENT
Start: 2019-01-07 | End: 2019-01-07

## 2019-01-07 RX ADMIN — Medication 81 MILLIGRAM(S): at 12:17

## 2019-01-07 RX ADMIN — INSULIN GLARGINE 9 UNIT(S): 100 INJECTION, SOLUTION SUBCUTANEOUS at 22:57

## 2019-01-07 RX ADMIN — Medication 1 DROP(S): at 05:14

## 2019-01-07 RX ADMIN — APIXABAN 5 MILLIGRAM(S): 2.5 TABLET, FILM COATED ORAL at 04:21

## 2019-01-07 RX ADMIN — Medication 0.5 MILLIGRAM(S): at 21:11

## 2019-01-07 RX ADMIN — SENNA PLUS 2 TABLET(S): 8.6 TABLET ORAL at 21:10

## 2019-01-07 RX ADMIN — Medication 100 MILLIGRAM(S): at 21:10

## 2019-01-07 RX ADMIN — Medication 500 MICROGRAM(S): at 05:39

## 2019-01-07 RX ADMIN — ONDANSETRON 4 MILLIGRAM(S): 8 TABLET, FILM COATED ORAL at 22:58

## 2019-01-07 RX ADMIN — Medication 0.12 MILLIGRAM(S): at 05:15

## 2019-01-07 RX ADMIN — Medication 40 MILLIGRAM(S): at 05:15

## 2019-01-07 RX ADMIN — PANTOPRAZOLE SODIUM 40 MILLIGRAM(S): 20 TABLET, DELAYED RELEASE ORAL at 05:15

## 2019-01-07 RX ADMIN — LEVALBUTEROL 0.63 MILLIGRAM(S): 1.25 SOLUTION, CONCENTRATE RESPIRATORY (INHALATION) at 19:07

## 2019-01-07 RX ADMIN — Medication 0.5 MILLIGRAM(S): at 05:55

## 2019-01-07 RX ADMIN — Medication 1 MILLIGRAM(S): at 21:10

## 2019-01-07 RX ADMIN — Medication 1 TABLET(S): at 12:17

## 2019-01-07 RX ADMIN — ONDANSETRON 4 MILLIGRAM(S): 8 TABLET, FILM COATED ORAL at 05:55

## 2019-01-07 RX ADMIN — Medication 100 MILLIGRAM(S): at 05:15

## 2019-01-07 RX ADMIN — AMIODARONE HYDROCHLORIDE 400 MILLIGRAM(S): 400 TABLET ORAL at 19:08

## 2019-01-07 RX ADMIN — MONTELUKAST 10 MILLIGRAM(S): 4 TABLET, CHEWABLE ORAL at 12:17

## 2019-01-07 RX ADMIN — Medication 5 MILLIGRAM(S): at 09:43

## 2019-01-07 RX ADMIN — Medication 5 MILLIGRAM(S): at 05:15

## 2019-01-07 RX ADMIN — LEVALBUTEROL 0.63 MILLIGRAM(S): 1.25 SOLUTION, CONCENTRATE RESPIRATORY (INHALATION) at 05:39

## 2019-01-07 RX ADMIN — APIXABAN 5 MILLIGRAM(S): 2.5 TABLET, FILM COATED ORAL at 19:08

## 2019-01-07 RX ADMIN — Medication 500000 UNIT(S): at 19:05

## 2019-01-07 RX ADMIN — Medication 1 DROP(S): at 19:04

## 2019-01-07 RX ADMIN — AMIODARONE HYDROCHLORIDE 400 MILLIGRAM(S): 400 TABLET ORAL at 05:15

## 2019-01-07 RX ADMIN — Medication 400 MILLIGRAM(S): at 08:38

## 2019-01-07 RX ADMIN — Medication 500000 UNIT(S): at 12:17

## 2019-01-07 RX ADMIN — Medication 1 APPLICATION(S): at 19:05

## 2019-01-07 RX ADMIN — Medication 2000 UNIT(S): at 12:18

## 2019-01-07 RX ADMIN — ISOSORBIDE MONONITRATE 30 MILLIGRAM(S): 60 TABLET, EXTENDED RELEASE ORAL at 12:17

## 2019-01-07 RX ADMIN — Medication 500000 UNIT(S): at 05:14

## 2019-01-07 RX ADMIN — Medication 5 MILLILITER(S): at 19:08

## 2019-01-07 RX ADMIN — Medication 15 MILLIGRAM(S): at 05:15

## 2019-01-07 RX ADMIN — LEVALBUTEROL 0.63 MILLIGRAM(S): 1.25 SOLUTION, CONCENTRATE RESPIRATORY (INHALATION) at 12:20

## 2019-01-07 RX ADMIN — Medication 500 MICROGRAM(S): at 21:22

## 2019-01-07 RX ADMIN — Medication 2: at 10:31

## 2019-01-07 NOTE — PROGRESS NOTE ADULT - PROBLEM SELECTOR PLAN 1
Monitor Telemetry   Kepp K+>4, MG++>2  CHADSVASC 4 continue Eliquis 5mg PO BID  continue Amiodarone load for nowcan rate control with diltiazem   npo for EVAN DCCV today discussed with patient    [id number] Monitor Telemetry   Kepp K+>4, MG++>2  CHADSVASC 4 continue Eliquis 5mg PO BID  continue Amiodarone load for now   can rate control with diltiazem   discontinue metoprolol due to severe copd   npo for EVAN DCCV today discussed with patient    [id number]

## 2019-01-07 NOTE — PROGRESS NOTE ADULT - SUBJECTIVE AND OBJECTIVE BOX
Patient is a 58y old  Female who presents with a chief complaint of dyspnea.    SUBJECTIVE / OVERNIGHT EVENTS:  Sitting in bed on O2 via NC, no acute distress, afebrile, awaiting on cardioversion for rapid       MEDICATIONS  (STANDING):  amiodarone    Tablet 400 milliGRAM(s) Oral two times a day  apixaban 5 milliGRAM(s) Oral every 12 hours  artificial tears (preservative free) Ophthalmic Solution 1 Drop(s) Both EYES three times a day  aspirin enteric coated 81 milliGRAM(s) Oral daily  azithromycin   Tablet 250 milliGRAM(s) Oral <User Schedule>  Biotene Dry Mouth Oral Rinse 5 milliLiter(s) Swish and Spit two times a day  buDESOnide   0.5 milliGRAM(s) Respule 0.5 milliGRAM(s) Inhalation every 12 hours  cholecalciferol 2000 Unit(s) Oral daily  dextrose 5%. 1000 milliLiter(s) (50 mL/Hr) IV Continuous <Continuous>  dextrose 50% Injectable 12.5 Gram(s) IV Push once  dextrose 50% Injectable 25 Gram(s) IV Push once  dextrose 50% Injectable 25 Gram(s) IV Push once  docusate sodium 100 milliGRAM(s) Oral three times a day  furosemide    Tablet 40 milliGRAM(s) Oral daily  insulin glargine Injectable (LANTUS) 9 Unit(s) SubCutaneous at bedtime  insulin lispro (HumaLOG) corrective regimen sliding scale   SubCutaneous three times a day before meals  insulin lispro (HumaLOG) corrective regimen sliding scale   SubCutaneous at bedtime  insulin lispro Injectable (HumaLOG) 4 Unit(s) SubCutaneous before breakfast  insulin lispro Injectable (HumaLOG) 3 Unit(s) SubCutaneous with dinner  insulin lispro Injectable (HumaLOG) 4 Unit(s) SubCutaneous before lunch  ipratropium    for Nebulization 500 MICROGram(s) Nebulizer every 6 hours  isosorbide   mononitrate ER Tablet (IMDUR) 30 milliGRAM(s) Oral daily  levalbuterol Inhalation 0.63 milliGRAM(s) Inhalation every 6 hours  melatonin 1 milliGRAM(s) Oral at bedtime  metoprolol tartrate Injectable 5 milliGRAM(s) IV Push every 6 hours  montelukast 10 milliGRAM(s) Oral daily  multivitamin 1 Tablet(s) Oral daily  nystatin    Suspension [id number] Unit(s) Oral four times a day  pantoprazole    Tablet 40 milliGRAM(s) Oral before breakfast  polyethylene glycol 3350 17 Gram(s) Oral daily  predniSONE   Tablet 15 milliGRAM(s) Oral daily  predniSONE   Tablet   Oral   senna 2 Tablet(s) Oral at bedtime  simethicone 80 milliGRAM(s) Chew once  theophylline ER Capsule 400 milliGRAM(s) Oral daily    MEDICATIONS  (PRN):  aluminum hydroxide/magnesium hydroxide/simethicone Suspension 30 milliLiter(s) Oral every 6 hours PRN Dyspepsia  bisacodyl Suppository 10 milliGRAM(s) Rectal daily PRN Constipation  dextrose 40% Gel 15 Gram(s) Oral once PRN Blood Glucose LESS THAN 70 milliGRAM(s)/deciliter  glucagon  Injectable 1 milliGRAM(s) IntraMuscular once PRN Glucose LESS THAN 70 milligrams/deciliter  ondansetron Injectable 4 milliGRAM(s) IV Push every 6 hours PRN Nausea and/or Vomiting  petrolatum Ophthalmic Ointment 1 Application(s) Both EYES at bedtime PRN dry eyes  sodium chloride 0.65% Nasal 1 Spray(s) Both Nostrils two times a day PRN Nasal Congestion      Vital Signs Last 24 Hrs  T(C): 36.6 (07 Jan 2019 12:23), Max: 36.8 (06 Jan 2019 20:29)  T(F): 97.8 (07 Jan 2019 12:23), Max: 98.2 (06 Jan 2019 20:29)  HR: 148 (07 Jan 2019 12:23) (76 - 150)  BP: 111/92 (07 Jan 2019 12:23) (111/92 - 145/95)  BP(mean): --  RR: 18 (07 Jan 2019 12:23) (18 - 20)  SpO2: 100% (07 Jan 2019 13:07) (100% - 100%)  CAPILLARY BLOOD GLUCOSE      POCT Blood Glucose.: 181 mg/dL (07 Jan 2019 12:30)  POCT Blood Glucose.: 230 mg/dL (07 Jan 2019 10:25)  POCT Blood Glucose.: 181 mg/dL (07 Jan 2019 08:07)  POCT Blood Glucose.: 106 mg/dL (06 Jan 2019 21:58)  POCT Blood Glucose.: 83 mg/dL (06 Jan 2019 17:28)    I&O's Summary    06 Jan 2019 07:01  -  07 Jan 2019 07:00  --------------------------------------------------------  IN: 340 mL / OUT: 0 mL / NET: 340 mL        PHYSICAL EXAM:-  GENERAL: NAD, well-developed  EYES: EOMI, PERRLA, conjunctiva and sclera clear  NECK: Supple, No JVD, no thyromegaly  CHEST/LUNG: Clear to auscultation bilaterally; No wheeze  HEART: Regular rate and rhythm; S1, S2 audible, No murmurs, rubs, or gallops  ABDOMEN: Soft, Nontender, Nondistended; Bowel sounds present  EXTREMITIES:  2+ Peripheral Pulses, No clubbing, cyanosis, or edema  NEURO: AAOx3, no focal deficit      LABS:                        10.9   8.6   )-----------( 352      ( 07 Jan 2019 06:27 )             32.1     01-07    139  |  88<L>  |  23  ----------------------------<  169<H>  3.9   |  39<H>  |  1.16    Ca    9.6      07 Jan 2019 06:27  Mg     2.4     01-07                RADIOLOGY & ADDITIONAL TESTS:    Imaging Personally Reviewed:  Consultant(s) Notes Reviewed:    Care Discussed with Consultants/Other Providers:

## 2019-01-07 NOTE — PROGRESS NOTE ADULT - ASSESSMENT
57 yo obese white female with a PMH anxiety Claustrophobia Raynaud phenomenon, COPD on home O2 3L 24hr/7 days,T2DM,  PVD HTN, HLD, CAD s/p x6 stents?, who was admitted on 11/29/18 for progressive worsening dyspnea. RRT called 1/6/19 for AF with RVR, s/p multiple IVP of diltiazem, metoprolol and  amiodarone bolus 150mg IVP x 1, cardizem infusion now on oral amiodarone load TTE with normal LV systolic function in Dec 2018. 57 yo obese white female former heavy smoker with a PMH anxiety Claustrophobia Raynaud phenomenon, emphysema COPD on home O2 3L 24hr/7 days,T2DM,  PVD HTN, HLD, CAD s/p x6 stents?, who was admitted on 11/29/18 for progressive worsening dyspnea. RRT called 1/6/19 for AF with RVR, s/p multiple IVP of diltiazem, metoprolol and  amiodarone bolus 150mg IVP x 1, cardizem infusion now on oral amiodarone load TTE with normal LV systolic function in Dec 2018.

## 2019-01-07 NOTE — PROGRESS NOTE ADULT - SUBJECTIVE AND OBJECTIVE BOX
HPI: up all night     MEDICATIONS  (STANDING):  amiodarone    Tablet 400 milliGRAM(s) Oral two times a day  apixaban 5 milliGRAM(s) Oral every 12 hours  artificial tears (preservative free) Ophthalmic Solution 1 Drop(s) Both EYES three times a day  aspirin enteric coated 81 milliGRAM(s) Oral daily  azithromycin   Tablet 250 milliGRAM(s) Oral <User Schedule>  Biotene Dry Mouth Oral Rinse 5 milliLiter(s) Swish and Spit two times a day  buDESOnide   0.5 milliGRAM(s) Respule 0.5 milliGRAM(s) Inhalation every 12 hours  cholecalciferol 2000 Unit(s) Oral daily  dextrose 5%. 1000 milliLiter(s) (50 mL/Hr) IV Continuous <Continuous>  dextrose 50% Injectable 12.5 Gram(s) IV Push once  dextrose 50% Injectable 25 Gram(s) IV Push once  dextrose 50% Injectable 25 Gram(s) IV Push once  docusate sodium 100 milliGRAM(s) Oral three times a day  furosemide    Tablet 40 milliGRAM(s) Oral daily  insulin glargine Injectable (LANTUS) 9 Unit(s) SubCutaneous at bedtime  insulin lispro (HumaLOG) corrective regimen sliding scale   SubCutaneous three times a day before meals  insulin lispro (HumaLOG) corrective regimen sliding scale   SubCutaneous at bedtime  insulin lispro Injectable (HumaLOG) 4 Unit(s) SubCutaneous before breakfast  insulin lispro Injectable (HumaLOG) 3 Unit(s) SubCutaneous with dinner  insulin lispro Injectable (HumaLOG) 4 Unit(s) SubCutaneous before lunch  ipratropium    for Nebulization 500 MICROGram(s) Nebulizer every 6 hours  isosorbide   mononitrate ER Tablet (IMDUR) 30 milliGRAM(s) Oral daily  levalbuterol Inhalation 0.63 milliGRAM(s) Inhalation every 6 hours  melatonin 1 milliGRAM(s) Oral at bedtime  metoprolol tartrate Injectable 5 milliGRAM(s) IV Push every 6 hours  montelukast 10 milliGRAM(s) Oral daily  multivitamin 1 Tablet(s) Oral daily  nystatin    Suspension [id number] Unit(s) Oral four times a day  pantoprazole    Tablet 40 milliGRAM(s) Oral before breakfast  polyethylene glycol 3350 17 Gram(s) Oral daily  predniSONE   Tablet 15 milliGRAM(s) Oral daily  predniSONE   Tablet   Oral   senna 2 Tablet(s) Oral at bedtime  simethicone 80 milliGRAM(s) Chew once  theophylline ER Capsule 400 milliGRAM(s) Oral daily    MEDICATIONS  (PRN):  aluminum hydroxide/magnesium hydroxide/simethicone Suspension 30 milliLiter(s) Oral every 6 hours PRN Dyspepsia  bisacodyl Suppository 10 milliGRAM(s) Rectal daily PRN Constipation  dextrose 40% Gel 15 Gram(s) Oral once PRN Blood Glucose LESS THAN 70 milliGRAM(s)/deciliter  glucagon  Injectable 1 milliGRAM(s) IntraMuscular once PRN Glucose LESS THAN 70 milligrams/deciliter  ondansetron Injectable 4 milliGRAM(s) IV Push every 6 hours PRN Nausea and/or Vomiting  petrolatum Ophthalmic Ointment 1 Application(s) Both EYES at bedtime PRN dry eyes  sodium chloride 0.65% Nasal 1 Spray(s) Both Nostrils two times a day PRN Nasal Congestion    Allergies No Known Allergies    Intolerances   albuterol (Unknown)        REVIEW OF SYSTEM:    Constitutional: denies fever, chills, fatigue  Neuro: denies headache, numbness, weakness, dizziness  Resp: denies cough, wheezing, shortness of breath  CVS: denies chest pain, palpitations, leg swelling  GI: denies abdominal pain, nausea, vomiting, diarrhea   : denies dysuria, frequency, incontinence  Skin: denies itching, burning, rashes, or lesions   Msk: denies joint pain or swelling    Vital Signs Last 24 Hrs  T(C): 36.7 (07 Jan 2019 05:06), Max: 36.8 (06 Jan 2019 20:29)  T(F): 98 (07 Jan 2019 05:06), Max: 98.2 (06 Jan 2019 20:29)  HR: 141 (07 Jan 2019 08:49) (76 - 155)  BP: 112/79 (07 Jan 2019 08:49) (112/79 - 145/95)  RR: 20 (07 Jan 2019 08:49) (18 - 20)  SpO2: 100% (07 Jan 2019 08:49) (98% - 100%)    Physical Exam:  General : well developed, well nourished,  and no acute distress  Neuro : Alert and oriented x 3, no focal deficits  HEENT : Sclera clear, no JVD, no Lymphadeopathy, no carotid bruits, neck supple  Lungs: Clear to Ascultation, no wheezing , rales or rhonchi   Cardiovascular : + 1 +2, RRR, no murmurs, no rubs  GI : abdomen soft, NT, ND, + BS   : no suprapubic tenderness  Extremities : No edema, + 2 DP and +2 PT, feet warm   Skin : Left infraclavicular implant site intact,  no hematoma, no bleeding,  no ecchymosis,   right groin flat no hematoma, no bleeding, no ecchymosis     TELE: 100 - 140's Afib RVR  EKG: Afib 100 bpm    LABS:                        10.9   8.6   )-----------( 352      ( 07 Jan 2019 06:27 )             32.1     01-07    139  |  88<L>  |  23  ----------------------------<  169<H>  3.9   |  39<H>  |  1.16    Ca    9.6      07 Jan 2019 06:27  Phos  3.8     01-05  Mg     1.9     01-05    TPro  6.4  /  Alb  3.8  /  TBili  0.3  /  DBili  x   /  AST  17  /  ALT  44  /  AlkPhos  52  01-05    PT/INR - ( 05 Jan 2019 13:07 )   PT: 10.5 sec;   INR: 0.92 ratio      PTT - ( 05 Jan 2019 13:07 )  PTT:28.7 sec   RADIOLOGY & ADDITIONAL TESTS:  < from: Transthoracic Echocardiogram (12.07.18 @ 08:59) >  Conclusions:  1. Normal mitral valve. EF 71%  2. Normal trileaflet aortic valve. Peak transaortic valve  gradient equals 6 mm Hg, mean transaortic valve gradient  equals 3 mm Hg, aortic valve velocity time integral equals  22 cm. Trace aortic regurgitation.  3. Aortic Root: 2.9 cm.  4. Normal left atrium.  LA volume index = 18 cc/m2.  5. Normal left ventricular internal dimensions and wall  thicknesses.  6. Normal left ventricular systolic function. No segmental  wall motion abnormalities.  7. Mild diastolic dysfunction (Stage I).  8. Normal right ventricular size and function.  9. Inadequate tricuspid regurgitation Doppler envelope  precludes estimation of RVSP.  10. Normal tricuspid valve. Minimal tricuspid  regurgitation.  *** Compared with echocardiogram report of 2/18/2014, no  significant changes noted.  ------------------------------------------------------------------------ HPI: up all night     MEDICATIONS  (STANDING):  amiodarone    Tablet 400 milliGRAM(s) Oral two times a day  apixaban 5 milliGRAM(s) Oral every 12 hours  artificial tears (preservative free) Ophthalmic Solution 1 Drop(s) Both EYES three times a day  aspirin enteric coated 81 milliGRAM(s) Oral daily  azithromycin   Tablet 250 milliGRAM(s) Oral <User Schedule>  Biotene Dry Mouth Oral Rinse 5 milliLiter(s) Swish and Spit two times a day  buDESOnide   0.5 milliGRAM(s) Respule 0.5 milliGRAM(s) Inhalation every 12 hours  cholecalciferol 2000 Unit(s) Oral daily  dextrose 5%. 1000 milliLiter(s) (50 mL/Hr) IV Continuous <Continuous>  dextrose 50% Injectable 12.5 Gram(s) IV Push once  dextrose 50% Injectable 25 Gram(s) IV Push once  dextrose 50% Injectable 25 Gram(s) IV Push once  docusate sodium 100 milliGRAM(s) Oral three times a day  furosemide    Tablet 40 milliGRAM(s) Oral daily  insulin glargine Injectable (LANTUS) 9 Unit(s) SubCutaneous at bedtime  insulin lispro (HumaLOG) corrective regimen sliding scale   SubCutaneous three times a day before meals  insulin lispro (HumaLOG) corrective regimen sliding scale   SubCutaneous at bedtime  insulin lispro Injectable (HumaLOG) 4 Unit(s) SubCutaneous before breakfast  insulin lispro Injectable (HumaLOG) 3 Unit(s) SubCutaneous with dinner  insulin lispro Injectable (HumaLOG) 4 Unit(s) SubCutaneous before lunch  ipratropium    for Nebulization 500 MICROGram(s) Nebulizer every 6 hours  isosorbide   mononitrate ER Tablet (IMDUR) 30 milliGRAM(s) Oral daily  levalbuterol Inhalation 0.63 milliGRAM(s) Inhalation every 6 hours  melatonin 1 milliGRAM(s) Oral at bedtime  metoprolol tartrate Injectable 5 milliGRAM(s) IV Push every 6 hours  montelukast 10 milliGRAM(s) Oral daily  multivitamin 1 Tablet(s) Oral daily  nystatin    Suspension [id number] Unit(s) Oral four times a day  pantoprazole    Tablet 40 milliGRAM(s) Oral before breakfast  polyethylene glycol 3350 17 Gram(s) Oral daily  predniSONE   Tablet 15 milliGRAM(s) Oral daily  predniSONE   Tablet   Oral   senna 2 Tablet(s) Oral at bedtime  simethicone 80 milliGRAM(s) Chew once  theophylline ER Capsule 400 milliGRAM(s) Oral daily    MEDICATIONS  (PRN):  aluminum hydroxide/magnesium hydroxide/simethicone Suspension 30 milliLiter(s) Oral every 6 hours PRN Dyspepsia  bisacodyl Suppository 10 milliGRAM(s) Rectal daily PRN Constipation  dextrose 40% Gel 15 Gram(s) Oral once PRN Blood Glucose LESS THAN 70 milliGRAM(s)/deciliter  glucagon  Injectable 1 milliGRAM(s) IntraMuscular once PRN Glucose LESS THAN 70 milligrams/deciliter  ondansetron Injectable 4 milliGRAM(s) IV Push every 6 hours PRN Nausea and/or Vomiting  petrolatum Ophthalmic Ointment 1 Application(s) Both EYES at bedtime PRN dry eyes  sodium chloride 0.65% Nasal 1 Spray(s) Both Nostrils two times a day PRN Nasal Congestion    Allergies No Known Allergies    Intolerances   albuterol (Unknown)        REVIEW OF SYSTEM:    Constitutional: denies fever, chills, fatigue  Neuro: denies headache, numbness, weakness, dizziness  Resp: denies cough, wheezing, shortness of breath  CVS: denies chest pain, palpitations, leg swelling  GI: denies abdominal pain, nausea, vomiting, diarrhea   : denies dysuria, frequency, incontinence  Skin: denies itching, burning, rashes, or lesions   Msk: denies joint pain or swelling    Vital Signs Last 24 Hrs  T(C): 36.7 (07 Jan 2019 05:06), Max: 36.8 (06 Jan 2019 20:29)  T(F): 98 (07 Jan 2019 05:06), Max: 98.2 (06 Jan 2019 20:29)  HR: 141 (07 Jan 2019 08:49) (76 - 155)  BP: 112/79 (07 Jan 2019 08:49) (112/79 - 145/95)  RR: 20 (07 Jan 2019 08:49) (18 - 20)  SpO2: 100% (07 Jan 2019 08:49) (98% - 100%)    Physical Exam:  General : well developed, well nourished,  and no acute distress  Neuro : Alert and oriented x 3, no focal deficits  HEENT : Sclera clear, no JVD, no Lymphadeopathy, no carotid bruits, neck supple  Lungs: diminshed significantly with exp wheezing r base    Cardiovascular : + 1 +2, RRR, no murmurs, no rubs  GI : abdomen soft, NT, ND, + BS   : no suprapubic tenderness  Extremities : +1  edema legs feet , feet warm   Skin : warm dry   TELE: 100 - 140's Afib RVR  EKG: Afib 100 bpm    LABS:                        10.9   8.6   )-----------( 352      ( 07 Jan 2019 06:27 )             32.1     01-07    139  |  88<L>  |  23  ----------------------------<  169<H>  3.9   |  39<H>  |  1.16    Ca    9.6      07 Jan 2019 06:27  Phos  3.8     01-05  Mg     1.9     01-05    TPro  6.4  /  Alb  3.8  /  TBili  0.3  /  DBili  x   /  AST  17  /  ALT  44  /  AlkPhos  52  01-05    PT/INR - ( 05 Jan 2019 13:07 )   PT: 10.5 sec;   INR: 0.92 ratio      PTT - ( 05 Jan 2019 13:07 )  PTT:28.7 sec   RADIOLOGY & ADDITIONAL TESTS:  < from: Transthoracic Echocardiogram (12.07.18 @ 08:59) >  Conclusions:  1. Normal mitral valve. EF 71%  2. Normal trileaflet aortic valve. Peak transaortic valve  gradient equals 6 mm Hg, mean transaortic valve gradient  equals 3 mm Hg, aortic valve velocity time integral equals  22 cm. Trace aortic regurgitation.  3. Aortic Root: 2.9 cm.  4. Normal left atrium.  LA volume index = 18 cc/m2.  5. Normal left ventricular internal dimensions and wall  thicknesses.  6. Normal left ventricular systolic function. No segmental  wall motion abnormalities.  7. Mild diastolic dysfunction (Stage I).  8. Normal right ventricular size and function.  9. Inadequate tricuspid regurgitation Doppler envelope  precludes estimation of RVSP.  10. Normal tricuspid valve. Minimal tricuspid  regurgitation.  *** Compared with echocardiogram report of 2/18/2014, no  significant changes noted.  ------------------------------------------------------------------------

## 2019-01-07 NOTE — PROGRESS NOTE ADULT - ASSESSMENT
58-year-old woman with PMH of COPD on home O2 3L, HTN, HLD, CAD s/p 6 stents, Type 2 DM, PVD, p/w progressive worsening dyspnea x 2 weeks a/w COPD exacerbation, acute on chronic hypoxic/hypercarbic respiratory failure, persistent dependency on bipap. 1/5 patient with new onset afib with RVR - RRT, EP evaluated, recommended cardioversion. Pulmonary plans for systemic steroid, bronchodilators and pulmonary rehab and eventually Lung transplant.

## 2019-01-07 NOTE — PROGRESS NOTE ADULT - SUBJECTIVE AND OBJECTIVE BOX
CARDIOLOGY FOLLOW UP - Dr. Brunson    CC no cp/sob   remains NICK,flutter 150s       PHYSICAL EXAM:  T(C): 36.6 (01-07-19 @ 12:23), Max: 36.8 (01-06-19 @ 20:29)  HR: 148 (01-07-19 @ 12:23) (76 - 150)  BP: 111/92 (01-07-19 @ 12:23) (111/92 - 145/95)  RR: 18 (01-07-19 @ 12:23) (18 - 20)  SpO2: 100% (01-07-19 @ 13:07) (100% - 100%)  Wt(kg): --  I&O's Summary    06 Jan 2019 07:01  -  07 Jan 2019 07:00  --------------------------------------------------------  IN: 340 mL / OUT: 0 mL / NET: 340 mL        Appearance: Normal	  Cardiovascular: Normal S1 S2,irreg, No JVD, No murmurs  Respiratory: diminished   Gastrointestinal:  Soft, Non-tender, + BS	  Extremities: Normal range of motion, No clubbing, b/l +2 le edema         MEDICATIONS  (STANDING):  amiodarone    Tablet 400 milliGRAM(s) Oral two times a day  apixaban 5 milliGRAM(s) Oral every 12 hours  artificial tears (preservative free) Ophthalmic Solution 1 Drop(s) Both EYES three times a day  aspirin enteric coated 81 milliGRAM(s) Oral daily  azithromycin   Tablet 250 milliGRAM(s) Oral <User Schedule>  Biotene Dry Mouth Oral Rinse 5 milliLiter(s) Swish and Spit two times a day  buDESOnide   0.5 milliGRAM(s) Respule 0.5 milliGRAM(s) Inhalation every 12 hours  cholecalciferol 2000 Unit(s) Oral daily  dextrose 5%. 1000 milliLiter(s) (50 mL/Hr) IV Continuous <Continuous>  dextrose 50% Injectable 12.5 Gram(s) IV Push once  dextrose 50% Injectable 25 Gram(s) IV Push once  dextrose 50% Injectable 25 Gram(s) IV Push once  docusate sodium 100 milliGRAM(s) Oral three times a day  furosemide    Tablet 40 milliGRAM(s) Oral daily  insulin glargine Injectable (LANTUS) 9 Unit(s) SubCutaneous at bedtime  insulin lispro (HumaLOG) corrective regimen sliding scale   SubCutaneous three times a day before meals  insulin lispro (HumaLOG) corrective regimen sliding scale   SubCutaneous at bedtime  insulin lispro Injectable (HumaLOG) 4 Unit(s) SubCutaneous before breakfast  insulin lispro Injectable (HumaLOG) 3 Unit(s) SubCutaneous with dinner  insulin lispro Injectable (HumaLOG) 4 Unit(s) SubCutaneous before lunch  ipratropium    for Nebulization 500 MICROGram(s) Nebulizer every 6 hours  isosorbide   mononitrate ER Tablet (IMDUR) 30 milliGRAM(s) Oral daily  levalbuterol Inhalation 0.63 milliGRAM(s) Inhalation every 6 hours  melatonin 1 milliGRAM(s) Oral at bedtime  metoprolol tartrate Injectable 5 milliGRAM(s) IV Push every 6 hours  montelukast 10 milliGRAM(s) Oral daily  multivitamin 1 Tablet(s) Oral daily  nystatin    Suspension [id number] Unit(s) Oral four times a day  pantoprazole    Tablet 40 milliGRAM(s) Oral before breakfast  polyethylene glycol 3350 17 Gram(s) Oral daily  predniSONE   Tablet 15 milliGRAM(s) Oral daily  predniSONE   Tablet   Oral   senna 2 Tablet(s) Oral at bedtime  simethicone 80 milliGRAM(s) Chew once  theophylline ER Capsule 400 milliGRAM(s) Oral daily      TELEMETRY: aflutter 150s 	    ECG:  	  RADIOLOGY:   DIAGNOSTIC TESTING:  [ ] Echocardiogram:  [ ]  Catheterization:  [ ] Stress Test:    OTHER: 	    LABS:	 	                                10.9   8.6   )-----------( 352      ( 07 Jan 2019 06:27 )             32.1     01-07    139  |  88<L>  |  23  ----------------------------<  169<H>  3.9   |  39<H>  |  1.16    Ca    9.6      07 Jan 2019 06:27  Mg     2.4     01-07

## 2019-01-07 NOTE — PROGRESS NOTE ADULT - PROBLEM SELECTOR PLAN 1
-test BG AC/HS  -C/w Lantus 9 units QHS  -c/w Humalog 4-4-3 w/meals (ensure pt is eating prior to giving dose)  -c/w Humalog low correction scale AC and Low HS scale  -Please move Prednisone dose to 8am instead of 6am.    discharge plan: restart home Metformin if off steroids  -Plan discussed with pt/team/staff. If discharge to rehab on same steroid doses will continue basal/bolus therapy. Doses TBD  Contact info: [phone] (24/7). pager [phone]

## 2019-01-07 NOTE — PROGRESS NOTE ADULT - SUBJECTIVE AND OBJECTIVE BOX
[facility] - [facility]     PROGRESS NOTE    CHIEF COMPLAINT: chronic hypoxic/hypercapnic respiratory failure; COPD exacerbation; emphysema; dyspnea; obesity; atrial fibrillation with RVR    INTERVAL HISTORY: remains in an atrial tachycardia with rate ~ 150bpm despite medical interventions - asymptomatic and hemodynamically stable - awaiting DCCV; slept in bed with BIPAP; off BIPAP all day yesterday without shortness of breath on a nasal canula @ 4lpm; ABG with improved hypercapnia without acidemia; arm swelling nearly resolved on diuretics and reduced dose of steroids; resolved AURORA, hyperkalemia and hyponatremia; no cough, sputum production, chest congestion or wheeze; no fevers, chills or sweats; no chest pain/pressure or palpitations; decreased lower extremity swelling in bed     REVIEW OF SYSTEMS:  Constitutional: As per interval history  HEENT: Within normal limits  CV: As per interval history  Resp: As per interval history  GI: Within normal limits   : Within normal limits  Musculoskeletal: improving lower extremity weakness   Skin: Within normal limits  Neurological: Within normal limits  Psychiatric: frustrated   Endocrine: Within normal limits  Hematologic/Lymphatic: Within normal limits  Allergic/Immunologic: Within normal limits    MEDICATIONS:     Pulmonary "  buDESOnide   0.5 milliGRAM(s) Respule 0.5 milliGRAM(s) Inhalation every 12 hours  ipratropium    for Nebulization 500 MICROGram(s) Nebulizer every 6 hours  levalbuterol Inhalation 0.63 milliGRAM(s) Inhalation every 6 hours  montelukast 10 milliGRAM(s) Oral daily  theophylline ER Capsule 400 milliGRAM(s) Oral daily      Anti-microbials:  azithromycin   Tablet 250 milliGRAM(s) Oral <User Schedule>  nystatin    Suspension 720326 Unit(s) Oral four times a day      Cardiovascular:  amiodarone    Tablet 400 milliGRAM(s) Oral two times a day  furosemide    Tablet 40 milliGRAM(s) Oral daily  isosorbide   mononitrate ER Tablet (IMDUR) 30 milliGRAM(s) Oral daily  metoprolol tartrate Injectable 5 milliGRAM(s) IV Push every 6 hours      Other:  aluminum hydroxide/magnesium hydroxide/simethicone Suspension 30 milliLiter(s) Oral every 6 hours PRN  apixaban 5 milliGRAM(s) Oral every 12 hours  artificial tears (preservative free) Ophthalmic Solution 1 Drop(s) Both EYES three times a day  aspirin enteric coated 81 milliGRAM(s) Oral daily  Biotene Dry Mouth Oral Rinse 5 milliLiter(s) Swish and Spit two times a day  bisacodyl Suppository 10 milliGRAM(s) Rectal daily PRN  cholecalciferol 2000 Unit(s) Oral daily  dextrose 40% Gel 15 Gram(s) Oral once PRN  dextrose 5%. 1000 milliLiter(s) IV Continuous <Continuous>  dextrose 50% Injectable 12.5 Gram(s) IV Push once  dextrose 50% Injectable 25 Gram(s) IV Push once  dextrose 50% Injectable 25 Gram(s) IV Push once  docusate sodium 100 milliGRAM(s) Oral three times a day  glucagon  Injectable 1 milliGRAM(s) IntraMuscular once PRN  insulin glargine Injectable (LANTUS) 9 Unit(s) SubCutaneous at bedtime  insulin lispro (HumaLOG) corrective regimen sliding scale   SubCutaneous three times a day before meals  insulin lispro (HumaLOG) corrective regimen sliding scale   SubCutaneous at bedtime  insulin lispro Injectable (HumaLOG) 4 Unit(s) SubCutaneous before breakfast  insulin lispro Injectable (HumaLOG) 3 Unit(s) SubCutaneous with dinner  insulin lispro Injectable (HumaLOG) 4 Unit(s) SubCutaneous before lunch  melatonin 1 milliGRAM(s) Oral at bedtime  multivitamin 1 Tablet(s) Oral daily  ondansetron Injectable 4 milliGRAM(s) IV Push every 6 hours PRN  pantoprazole    Tablet 40 milliGRAM(s) Oral before breakfast  petrolatum Ophthalmic Ointment 1 Application(s) Both EYES at bedtime PRN  polyethylene glycol 3350 17 Gram(s) Oral daily  predniSONE   Tablet 15 milliGRAM(s) Oral daily  predniSONE   Tablet   Oral   senna 2 Tablet(s) Oral at bedtime  simethicone 80 milliGRAM(s) Chew once  sodium chloride 0.65% Nasal 1 Spray(s) Both Nostrils two times a day PRN        OBJECTIVE:    I&O's Detail    06 Jan 2019 07:01  -  07 Jan 2019 07:00  --------------------------------------------------------  IN:    Oral Fluid: 340 mL  Total IN: 340 mL    OUT:  Total OUT: 0 mL    Total NET: 340 mL    POCT Blood Glucose.: 181 mg/dL (07 Jan 2019 12:30)  POCT Blood Glucose.: 230 mg/dL (07 Jan 2019 10:25)  POCT Blood Glucose.: 181 mg/dL (07 Jan 2019 08:07)  POCT Blood Glucose.: 106 mg/dL (06 Jan 2019 21:58)  POCT Blood Glucose.: 83 mg/dL (06 Jan 2019 17:28)      PHYSICAL EXAM:       ICU Vital Signs Last 24 Hrs  T(C): 36.6 (07 Jan 2019 12:23), Max: 36.8 (06 Jan 2019 20:29)  T(F): 97.8 (07 Jan 2019 12:23), Max: 98.2 (06 Jan 2019 20:29)  HR: 148 (07 Jan 2019 12:23) (76 - 150)  BP: 111/92 (07 Jan 2019 12:23) (111/92 - 145/95)  BP(mean): --  ABP: --  ABP(mean): --  RR: 18 (07 Jan 2019 12:23) (18 - 20)  SpO2: 100% (07 Jan 2019 13:07) (100% - 100%) on 4lpm nasal canula     General: Awake. Alert. Cooperative. No distress. Appears stated age. Obese. Cushingoid.  HEENT:  Atraumatic. Moonlike facies. Anicteric. Normal oral mucosa. PERRL. EOMI.   Neck: Supple. Trachea midline. Thyroid without enlargement/tenderness/nodules. No carotid bruit. No JVD.	  Cardiovascular: Tachycardic. Regular rate and rhythm. Distant S1 S2. No murmurs, rubs or gallops.  Respiratory: Respirations unlabored. Markedly decreased breath sounds throughout. No wheeze. No curvature.  Abdomen: Soft. Non-tender. Non-distended. No organomegaly. No masses. Normal bowel sounds. Obese.  Extremities: Warm to touch. No clubbing or cyanosis. Mild bilateral lower extremity edema up to the thigh. Upper extremity swelling resolved.   Pulses: Decreased lower extremity peripheral pulses.  Skin: No rashes or lesions. No ecchymoses. No cyanosis. Warm to touch.   Lymph Nodes: Cervical, supraclavicular and axillary nodes normal  Neurological: Motor and sensory examination equal and normal. A and O x 3  Psychiatry: Frustrated    LABS:                        10.9   8.6   )-----------( 352      ( 07 Jan 2019 06:27 )             32.1                         10.2   7.3   )-----------( 331      ( 06 Jan 2019 07:19 )             30.4     01-07    139  |  88<L>  |  23  ----------------------------<  169<H>  3.9   |  39<H>  |  1.16    01-06    137  |  88<L>  |  19  ----------------------------<  195<H>  3.8   |  36<H>  |  1.02    Ca      9.6      01-07    Ca      9.4      01-06    Phos    3.8     01-05    Phos    3.7     01-03      Mg       2.4     01-07    Mg       1.9     01-05    TPro  6.4  /  Alb  3.8  /  TBili  0.3  /  DBili  x   /  AST  17  /  ALT  44  /  AlkPhos  52  01-05    TPro  6.0  /  Alb  3.6  /  TBili  0.4  /  DBili  x   /  AST  28  /  ALT  43  /  AlkPhos  53  01-03    ABG - ( 06 Jan 2019 06:44 )  pH: 7.39  /  pCO2: 68    /  pO2: 99    / HCO3: 40    / Base Excess: 13.3  /  SaO2: 98        ABG - ( 03 Jan 2019 06:40 )  pH: 7.44  /  pCO2: 65    /  pO2: 152   / HCO3: 43    / Base Excess: 15.9  /  SaO2: 97        ABG - ( 01 Jan 2019 03:44 )  pH: 7.42  /  pCO2: 69    /  pO2: 165   / HCO3: 44    / Base Excess: 17.3  /  SaO2: 97        ABG - ( 31 Dec 2018 16:19 )  pH: 7.38  /  pCO2: 84    /  pO2: 26    / HCO3: 48    / Base Excess: 20.2  /  SaO2: 41        ABG - ( 18 Dec 2018 13:23 )  pH: 7.46  /  pCO2: 47    /  pO2: 88    / HCO3: 33    / Base Excess: 8.0   /  SaO2: 97        ABG - ( 16 Dec 2018 20:53 )  pH: 7.44  /  pCO2: 53    /  pO2: 173   / HCO3: 36    / Base Excess: 10.0  /  SaO2: 100       ABG - ( 13 Dec 2018 06:29 )  pH: 7.30  /  pCO2: 71    /  pO2: 182   / HCO3: 34    / Base Excess: 5.8   /  SaO2: 99        ABG - ( 13 Dec 2018 00:59 )  pH: 7.32  /  pCO2: 71    /  pO2: 75    / HCO3: 35    / Base Excess: 7.1   /  SaO2: 95        ABG - ( 11 Dec 2018 11:45 )  pH: 7.39  /  pCO2: 54    /  pO2: 98    / HCO3: 32    / Base Excess: 6.2   /  SaO2: 98        ABG - ( 09 Dec 2018 11:26 )  pH: 7.35  /  pCO2: 63    /  pO2: 145   / HCO3: 34    / Base Excess: 6.6   /  SaO2: 99      ABG - ( 09 Dec 2018 00:28 )  pH: 7.32  /  pCO2: 71    /  pO2: 124   / HCO3: 36    / Base Excess: 7.6   /  SaO2: 99        ABG - ( 08 Dec 2018 20:50 )  pH: 7.32  /  pCO2: 72    /  pO2: 80    / HCO3: 36    / Base Excess: 7.7   /  SaO2: 95        Serum Pro-Brain Natriuretic Peptide: 254 pg/mL (12-13 @ 14:00)    < from: Transthoracic Echocardiogram (12.07.18 @ 08:59) >    Patient name: GUY HARTMAN  YOB: 1960   Age: 58 (F)   MR#: [id number]  Study Date: 12/7/2018  Location: [address] [place], [place] [id number]: Laquita Armando [facility]  Study quality: Technically good  Referring Physician: Allen ingram MD  BloodPressure: 120/80 mmHg  Height: 163 cm  Weight: 88 kg  BSA: 1.9 m2  ------------------------------------------------------------------------  PROCEDURE: Transthoracic echocardiogram with 2-D, M-Mode  and complete spectral and color flow Doppler.  INDICATION: Other forms of dyspnea (R06.09)  ------------------------------------------------------------------------  Dimensions:    Normal Values:  LA:     3.6    2.0 - 4.0 cm  Ao:     2.9    2.0 - 3.8 cm  SEPTUM: 0.9    0.6 - 1.2 cm  PWT:    0.8    0.6- 1.1 cm  LVIDd:  4.9    3.0 - 5.6 cm  LVIDs:  2.9    1.8 - 4.0 cm  Derived variables:  LVMI: 73 g/m2  RWT: 0.32  Fractional short: 40 %  EF (Teicholtz): 71 %  Doppler Peak Velocity (m/sec): AoV=1.2  ------------------------------------------------------------------------  Observations:  Mitral Valve: Normal mitral valve.  Aortic Valve/Aorta: Normal trileaflet aortic valve. Peak  transaortic valve gradient equals 6 mm Hg, mean transaortic  valve gradient equals 3 mm Hg, aortic valve velocity time  integral equals 22 cm. Trace aortic regurgitation.  Aortic Root: 2.9 cm.  Left Atrium: Normal left atrium.  LA volume index = 18  cc/m2.  Left Ventricle: Normal left ventricular systolic function.  No segmental wall motion abnormalities. Normal left  ventricular internal dimensions and wall thicknesses. Mild  diastolic dysfunction (Stage I).  Right Heart: Normal right atrium. Normal right ventricular  size and function. Normal tricuspid valve. Minimal  tricuspid regurgitation. Normal pulmonic valve.  Pericardium/Pleura: Normal pericardium with no pericardial  effusion.  Hemodynamic: Estimated right atrial pressure is 8 mm Hg.  Inadequate tricuspid regurgitation Doppler envelope  precludes estimation of RVSP.  ------------------------------------------------------------------------  Conclusions:  1. Normal mitral valve.  2. Normal trileaflet aortic valve. Peak transaortic valve  gradient equals 6 mm Hg, mean transaortic valve gradient  equals 3 mm Hg, aortic valve velocity time integral equals  22 cm. Trace aortic regurgitation.  3. Aortic Root: 2.9 cm.  4. Normal left atrium.  LA volume index = 18 cc/m2.  5. Normal left ventricular internal dimensions and wall  thicknesses.  6. Normal left ventricular systolic function. No segmental  wall motion abnormalities.  7. Mild diastolic dysfunction (Stage I).  8. Normal right ventricular size and function.  9. Inadequate tricuspid regurgitation Doppler envelope  precludes estimation of RVSP.  10. Normal tricuspid valve. Minimal tricuspid  regurgitation.  *** Compared with echocardiogram report of 2/18/2014, no  significant changes noted.  ------------------------------------------------------------------------  Confirmed on  12/7/2018 - 13:49:43 by Shefali Gómez M.D.  ------------------------------------------------------------------------    < end of copied text >  ------------------------------------------------------------------------------------------------  MICROBIOLOGY:     Culture - Sputum . (12.07.18 @ 08:34)    Gram Stain:   Rare polymorphonuclear leukocytes per low power field  Rare Squamous epithelial cells per low power field  Numerous Gram Positive Cocci in Pairs and Chains per oil power field    Specimen Source: .Sputum Sputum    Culture Results:   Normal Respiratory Samaria present    Culture - Sputum . (12.05.18 @ 22:50)    Gram Stain:   Moderate polymorphonuclear leukocytes per low power field  Few Squamous epithelial cells per low power field  Moderate Gram Positive Cocci in Pairs and Chains per oil power field    Specimen Source: .Sputum Sputum    Culture Results:   Normal Respiratory Samaria present    Rapid Respiratory Viral Panel (11.30.18 @ 01:30)    Rapid RVP Result: NotDete: The [employer] RVP Rapid uses polymerase chain reaction (PCR) and melt  curve analysis to screen for adenovirus; coronavirus HKU1, NL63, 229E,  OC43; human metapneumovirus (hMPV); human enterovirus/rhinovirus  (Entero/RV); influenza A; influenza A/H1;influenza A/H3; influenza  A/H1-2009; influenza B; parainfluenza viruses 1, 2, 3, 4; respiratory  syncytial virus; Bordetella pertussis; Mycoplasma pneumoniae; and  Chlamydophila pneumoniae.    RADIOLOGY:  [x] Chest radiographs reviewed and interpreted by me    CXR 1/5/2019 - no evidence of pulmonary edema, pneumonia, masses, nodules or pleural effusions    < from: [facility] Duplex Lower Ext Vein Scan, Darius (12.30.18 @ 19:06) >    EXAM:  DUPLEX SCAN EXT VEINS LOWER BI                          PROCEDURE DATE:  12/30/2018      INTERPRETATION:  Clinical indication: Worsening edema.    Technique:  Grayscale, color Doppler and spectral Doppler ultrasound was   utilized toevaluate bilateral lower extremity deep venous system.      Comparison: 12/6/2018.    Findings: There is no thrombosis in bilateral common femoral veins,   femoral veins or popliteal veins. Visualized calf veins are patent. There   is bilateral lowerextremity soft tissue edema.    Impression:     No evidence of deep vein thrombosis in either lower extremity.    BROCK TOBIN M.D., ATTENDING RADIOLOGIST  This document has been electronically signed. Dec 30 2018  7:24PM     < end of copied text >  ---------------------------------------------------------------------------------------------------------------  < from: [facility] Duplex Upper Ext Vein Scan, Darius (12.30.18 @ 19:05) >    EXAM:  DUPLEX SCAN EXT VEINS UPPER BI                          PROCEDURE DATE:  12/30/2018      INTERPRETATION:  Clinical information: Worsening bilateral upper   extremity edema.    Findings: Duplex evaluation of the deep venous system of the right and   left upper extremity was performed to include the brachiocephalic,   internal jugular, subclavian, axillary, brachial, radial and ulnar veins.   No echogenic thrombus is seen within the vein lumina. Complete coaptation   of those segments of vein accessible to compression was achieved.   Spontaneous venous flow with respiratory and cardiac pulsatility is noted   throughout.     The right innominate vein is patent. The left innominate vein was not   visualized.     The right and left basilic and cephalic veins are patent and compressible.    Impression: No duplex evidence of DVT in the right and left upper   extremity.    RAYMUNDO DUMONT M.D., ATTENDING RADIOLOGIST  This document has been electronically signed. Dec 31 2018  8:49AM     < end of copied text >  ---------------------------------------------------------------------------------------------------------------  < from: Xray Chest 1 View AP/PA (12.25.18 @ 18:04) >    EXAM:  XR CHEST AP OR PA 1V                          PROCEDURE DATE:  12/25/2018      INTERPRETATION:  CLINICAL INFORMATION: Shortness of breath. History of   COPD.    TECHNIQUE: AP view of the chest 12/25/2018.    COMPARISON: Chest radiograph 12/13/2018.     FINDINGS:     The lungs are clear. There is no pneumothorax and no pleural effusions.   Cardiac size is not accurately evaluated in this projection.  The visualized osseous structures demonstrate no acute abnormality.    IMPRESSION:   Clear lungs.    SUSANA HODGES M.D., RADIOLOGY RESIDENT  This document has been electronically signed.  SVETLANA SANDOVAL M.D., ATTENDING RADIOLOGIST  This document has been electronically signed. Dec 26 2018 10:59AM      < end of copied text >  ---------------------------------------------------------------------------------------------------------------  < from: CT Angio Chest w/ IV Cont (12.04.18 @ 16:30) >    EXAM:  CT ANGIO CHEST (W)AW IC                          PROCEDURE DATE:  12/04/2018      INTERPRETATION:  CT CHEST WITH CONTRAST    INDICATION: Known COPD not responding to steroids and bronchodilators.   Progressively worsening dyspnea. Evaluate for pulmonary embolism.    IMPRESSION:   1.  No pulmonary embolism.  2. Emphysematous changes of the lung.    DIANA MEDINA M.D., RADIOLOGY RESIDENT  This document has been electronically signed.  JEYSON SANCHEZ M.D., ATTENDING RADIOLOGIST  This document has been electronically signed. Dec  4 2018  5:21PM      < end of copied text >  ---------------------------------------------------------------------------------------------------------------  SPIROMETRY:     FEV1 0.56 liters - 22% predicted  FVC 1.73 liters - 52% predicted  FEV1% 32

## 2019-01-07 NOTE — CHART NOTE - NSCHARTNOTEFT_GEN_A_CORE
Pt seen for LOS follow-up.    58 year old female pt with PMH anxiety, claustrophobia, Raynaud phenomenon, COPD on home O2 3L 24/7 days, T2DM, PVD, HTN, HLD, CAD s/p x6 stents who was admitted on 11/29/18 for progressive worsening dyspnea. RRT called 1/6/19 for AF with RVR, now on oral amiodarone load. NPO planned for EVAN DCCV today.    Source: Patient [x]    Family [ ]     other [x] electronic medical records    Diet : NPO for procedure. Prior: Consistent CHO with evening snack. Supplement: Glucerna x 1 daily (220 kcal, 10 grams protein).    Patient reports [ ] nausea  [ ] vomiting [ ] diarrhea [ ] constipation  [ ]chewing problems [ ] swallowing issues  [ ] other:     PO intake:  prior to NPO< 50% [ ] 50-75% [ ]   % [x]  other :    Source for PO intake [x] Patient [ ] family [x] chart [ ] staff [ ] other    Enteral/Parenteral Nutrition: n/a    Current Weight: 207 pounds (as of 12/19/18, bedscale). 195.3 pounds admit wt 11/29/18, standing. Wt discrepancy likely r/t difference in weighing methods.    Pertinent Medications: MEDICATIONS  (STANDING):  amiodarone    Tablet 400 milliGRAM(s) Oral two times a day  apixaban 5 milliGRAM(s) Oral every 12 hours  artificial tears (preservative free) Ophthalmic Solution 1 Drop(s) Both EYES three times a day  aspirin enteric coated 81 milliGRAM(s) Oral daily  azithromycin   Tablet 250 milliGRAM(s) Oral <User Schedule>  Biotene Dry Mouth Oral Rinse 5 milliLiter(s) Swish and Spit two times a day  buDESOnide   0.5 milliGRAM(s) Respule 0.5 milliGRAM(s) Inhalation every 12 hours  cholecalciferol 2000 Unit(s) Oral daily  dextrose 5%. 1000 milliLiter(s) (50 mL/Hr) IV Continuous <Continuous>  dextrose 50% Injectable 12.5 Gram(s) IV Push once  dextrose 50% Injectable 25 Gram(s) IV Push once  dextrose 50% Injectable 25 Gram(s) IV Push once  docusate sodium 100 milliGRAM(s) Oral three times a day  furosemide    Tablet 40 milliGRAM(s) Oral daily  insulin glargine Injectable (LANTUS) 9 Unit(s) SubCutaneous at bedtime  insulin lispro (HumaLOG) corrective regimen sliding scale   SubCutaneous three times a day before meals  insulin lispro (HumaLOG) corrective regimen sliding scale   SubCutaneous at bedtime  insulin lispro Injectable (HumaLOG) 4 Unit(s) SubCutaneous before breakfast  insulin lispro Injectable (HumaLOG) 3 Unit(s) SubCutaneous with dinner  insulin lispro Injectable (HumaLOG) 4 Unit(s) SubCutaneous before lunch  ipratropium    for Nebulization 500 MICROGram(s) Nebulizer every 6 hours  isosorbide   mononitrate ER Tablet (IMDUR) 30 milliGRAM(s) Oral daily  levalbuterol Inhalation 0.63 milliGRAM(s) Inhalation every 6 hours  melatonin 1 milliGRAM(s) Oral at bedtime  metoprolol tartrate Injectable 5 milliGRAM(s) IV Push every 6 hours  montelukast 10 milliGRAM(s) Oral daily  multivitamin 1 Tablet(s) Oral daily  nystatin    Suspension 279601 Unit(s) Oral four times a day  pantoprazole    Tablet 40 milliGRAM(s) Oral before breakfast  polyethylene glycol 3350 17 Gram(s) Oral daily  predniSONE   Tablet 15 milliGRAM(s) Oral daily  predniSONE   Tablet   Oral   senna 2 Tablet(s) Oral at bedtime  simethicone 80 milliGRAM(s) Chew once  theophylline ER Capsule 400 milliGRAM(s) Oral daily    MEDICATIONS  (PRN):  aluminum hydroxide/magnesium hydroxide/simethicone Suspension 30 milliLiter(s) Oral every 6 hours PRN Dyspepsia  bisacodyl Suppository 10 milliGRAM(s) Rectal daily PRN Constipation  dextrose 40% Gel 15 Gram(s) Oral once PRN Blood Glucose LESS THAN 70 milliGRAM(s)/deciliter  glucagon  Injectable 1 milliGRAM(s) IntraMuscular once PRN Glucose LESS THAN 70 milligrams/deciliter  ondansetron Injectable 4 milliGRAM(s) IV Push every 6 hours PRN Nausea and/or Vomiting  petrolatum Ophthalmic Ointment 1 Application(s) Both EYES at bedtime PRN dry eyes  sodium chloride 0.65% Nasal 1 Spray(s) Both Nostrils two times a day PRN Nasal Congestion    Pertinent Labs:  01-07 Na139 mmol/L Glu 169 mg/dL<H> K+ 3.9 mmol/L Cr  1.16 mg/dL BUN 23 mg/dL 01-05 Phos 3.8 mg/dL 01-05 Alb 3.8 g/dL  FS: 230, 181, 106.      Skin: No pressure ulcers noted      Estimated Needs:   [x] no change since previous assessment  [ ] recalculated:       Previous Nutrition Diagnosis: Increased nutrient needs       Nutrition Diagnosis is [x] ongoing  [ ] resolved [ ] not applicable          New Nutrition Diagnosis: [x] not applicable       Interventions:     1)        Monitoring and Evaluation:     [x] PO intake [x] Tolerance to diet prescription [x] weights [x] follow up per protocol    [x] other: RD remains available: Cony Loco MS, RDN, CDN, CDE. #[phone] Pt seen for LOS follow-up. Pt reports good appetite, reports good po intake prior to NPO consuming most-all of meals and Glucerna supplement.    58 year old female pt with PMH anxiety, claustrophobia, Raynaud phenomenon, COPD on home O2 3L 24/7 days, T2DM, PVD, HTN, HLD, CAD s/p x6 stents who was admitted on 11/29/18 for progressive worsening dyspnea. RRT called 1/6/19 for AF with RVR, now on oral amiodarone load. NPO planned for EVAN DCCV today.    Source: Patient [x]    Family [ ]     other [x] electronic medical records    Diet : NPO for procedure. Prior: Consistent CHO with evening snack. Supplement: Glucerna x 1 daily (220 kcal, 10 grams protein).    Patient reports [ ] nausea  [ ] vomiting [ ] diarrhea [ ] constipation  [ ]chewing problems [ ] swallowing issues  [x] other: Denies GI distress at this time.    PO intake (meals):  prior to NPO< 50% [ ] 50-75% [ ]   % [x]  other :  PO intake (supplement): 100% 1 can Glucerna daily    Source for PO intake [x] Patient [ ] family [x] chart [ ] staff [ ] other    Enteral/Parenteral Nutrition: n/a    Current Weight: 207 pounds (as of 12/19/18, bedscale). 195.3 pounds admit wt 11/29/18, standing. Wt discrepancy likely r/t difference in weighing methods.    Pertinent Medications: MEDICATIONS  (STANDING):  amiodarone    Tablet 400 milliGRAM(s) Oral two times a day  apixaban 5 milliGRAM(s) Oral every 12 hours  artificial tears (preservative free) Ophthalmic Solution 1 Drop(s) Both EYES three times a day  aspirin enteric coated 81 milliGRAM(s) Oral daily  azithromycin   Tablet 250 milliGRAM(s) Oral <User Schedule>  Biotene Dry Mouth Oral Rinse 5 milliLiter(s) Swish and Spit two times a day  buDESOnide   0.5 milliGRAM(s) Respule 0.5 milliGRAM(s) Inhalation every 12 hours  cholecalciferol 2000 Unit(s) Oral daily  dextrose 5%. 1000 milliLiter(s) (50 mL/Hr) IV Continuous <Continuous>  dextrose 50% Injectable 12.5 Gram(s) IV Push once  dextrose 50% Injectable 25 Gram(s) IV Push once  dextrose 50% Injectable 25 Gram(s) IV Push once  docusate sodium 100 milliGRAM(s) Oral three times a day  furosemide    Tablet 40 milliGRAM(s) Oral daily  insulin glargine Injectable (LANTUS) 9 Unit(s) SubCutaneous at bedtime  insulin lispro (HumaLOG) corrective regimen sliding scale   SubCutaneous three times a day before meals  insulin lispro (HumaLOG) corrective regimen sliding scale   SubCutaneous at bedtime  insulin lispro Injectable (HumaLOG) 4 Unit(s) SubCutaneous before breakfast  insulin lispro Injectable (HumaLOG) 3 Unit(s) SubCutaneous with dinner  insulin lispro Injectable (HumaLOG) 4 Unit(s) SubCutaneous before lunch  ipratropium    for Nebulization 500 MICROGram(s) Nebulizer every 6 hours  isosorbide   mononitrate ER Tablet (IMDUR) 30 milliGRAM(s) Oral daily  levalbuterol Inhalation 0.63 milliGRAM(s) Inhalation every 6 hours  melatonin 1 milliGRAM(s) Oral at bedtime  metoprolol tartrate Injectable 5 milliGRAM(s) IV Push every 6 hours  montelukast 10 milliGRAM(s) Oral daily  multivitamin 1 Tablet(s) Oral daily  nystatin    Suspension 139586 Unit(s) Oral four times a day  pantoprazole    Tablet 40 milliGRAM(s) Oral before breakfast  polyethylene glycol 3350 17 Gram(s) Oral daily  predniSONE   Tablet 15 milliGRAM(s) Oral daily  predniSONE   Tablet   Oral   senna 2 Tablet(s) Oral at bedtime  simethicone 80 milliGRAM(s) Chew once  theophylline ER Capsule 400 milliGRAM(s) Oral daily    MEDICATIONS  (PRN):  aluminum hydroxide/magnesium hydroxide/simethicone Suspension 30 milliLiter(s) Oral every 6 hours PRN Dyspepsia  bisacodyl Suppository 10 milliGRAM(s) Rectal daily PRN Constipation  dextrose 40% Gel 15 Gram(s) Oral once PRN Blood Glucose LESS THAN 70 milliGRAM(s)/deciliter  glucagon  Injectable 1 milliGRAM(s) IntraMuscular once PRN Glucose LESS THAN 70 milligrams/deciliter  ondansetron Injectable 4 milliGRAM(s) IV Push every 6 hours PRN Nausea and/or Vomiting  petrolatum Ophthalmic Ointment 1 Application(s) Both EYES at bedtime PRN dry eyes  sodium chloride 0.65% Nasal 1 Spray(s) Both Nostrils two times a day PRN Nasal Congestion      Pertinent Labs:  01-07 Na139 mmol/L Glu 169 mg/dL<H> K+ 3.9 mmol/L Cr  1.16 mg/dL BUN 23 mg/dL 01-05 Phos 3.8 mg/dL 01-05 Alb 3.8 g/dL  FS: 230, 181, 106.      Skin: No pressure ulcers noted, +2 edema L foot, R leg noted.      Estimated Needs:   [x] no change since previous assessment  [ ] recalculated:       Previous Nutrition Diagnosis: Increased nutrient needs       Nutrition Diagnosis is [x] ongoing  [ ] resolved [ ] not applicable          New Nutrition Diagnosis: [x] not applicable       Interventions:     1) After procedure resume consistent CHO with evening snack diet for glucose control, recommend add DASH/TLC to promote heart health  2) Continue Glucerna x 1 daily in setting of increased kcal/nutrient needs  3) Review of diet education prn     Monitoring and Evaluation:     [x] PO intake [x] Tolerance to diet prescription [x] weights [x] follow up per protocol    [x] other: RD remains available: Cony Loco MS, RDN, CDN, CDE. #[phone]

## 2019-01-07 NOTE — PROGRESS NOTE ADULT - ASSESSMENT
58 F PMH COPD on home O2 3L, HTN, HLD, CAD s/p x6 stents, T2DM, pHTN, PVD, p/w progressive worsening dyspnea x 2 weeks now complicated by chronic progressive hypoxic respiratory failure.     1. New onset AFIB  likely in the setting of chronic lung condition   pt. remains tachy on amio, bb,   CHADSVASC 4 continue Eliquis 5mg PO BID  plan for monty/dccv today   EP f/u     2. Chronic progressive hypoxic respiratory failure  multifactorial in the setting of worsening COPD, CAD, chronic diastolic CHF, pulmonary HTN   bl feet edema improving, continue lasix 40mg PO daily   UE/ LE dopplers negative for DVT   echo with normal LV function, mild diastolic dysfunction, inadequate tricuspid regurg   cv stable no chest pain or sob   bipap PRN  continue dueonebs, inhaled steroids, singulair/theophylline/daliresp, pulm f/u   EKG reviewed, corrected QT WNL (aprox 462), on azithromycin    3. CAD s/p PCI  cv stable, continue asa, imdur    4. COPD   on bipap  continue Duoneb inhaled steroids, singulair/theophylline/daliresp, pulm f/u     5. hyponatremia/hyperkalemia  trend bmp, med f/u  dvt ppx 58 F PMH COPD on home O2 3L, HTN, HLD, CAD s/p x6 stents, T2DM, pHTN, PVD, p/w progressive worsening dyspnea x 2 weeks now complicated by chronic progressive hypoxic respiratory failure.     1. New onset AFIB  pt. remains tachy on amio, bb,   CHADSVASC 4 continue Eliquis 5mg PO BID  plan for monty/dccv today   EP f/u     2. Chronic progressive hypoxic respiratory failure  multifactorial in the setting of worsening COPD, CAD, chronic diastolic CHF, pulmonary HTN   bl feet edema improving, continue lasix 40mg PO daily   UE/ LE dopplers negative for DVT   echo with normal LV function, mild diastolic dysfunction, inadequate tricuspid regurg   cv stable no chest pain or sob   bipap PRN  continue dueonebs, inhaled steroids, singulair/theophylline/daliresp, pulm f/u   EKG reviewed, corrected QT WNL (aprox 462), on azithromycin    3. CAD s/p PCI  cv stable, continue asa, imdur    4. COPD   on bipap  continue Duoneb inhaled steroids, singulair/theophylline/daliresp, pulm f/u     5. hyponatremia/hyperkalemia  trend bmp, med f/u  dvt ppx

## 2019-01-07 NOTE — PROGRESS NOTE ADULT - PROBLEM SELECTOR PLAN 1
Still a.fib with RVR on Monitor  - EP evaluated and recommended cardioversion today, has been on Eliquis    spoke to EP-NP  - c/w Amiodarone, Metoprolol

## 2019-01-07 NOTE — PROGRESS NOTE ADULT - SUBJECTIVE AND OBJECTIVE BOX
Diabetes Follow up note: Saw pt this am  Interval Hx: 57 y/o F w/h/o controlled T2DM on Metformin 500mg bid. Also h/o CAD and COPD. Here with COPD exacerbation> Prednisone dose 15mg daily until tomorrow and then coming down to 10mg x5 days and then 5 mg another 5 days. Pt now on cardiac unit due to new afib. NPO today for monty/dccv today. Hyperglycemic while NPO due to steroid therapy.  Pt appears upset about new afib and keeps saying she doesn't understand why she has this, However, pt then verbalizes that cardiac team has discussed with her possible causes and needed treatment. Didn't receive Humalog correction dose this am so BG at 10am >200s.      Review of Systems:  General: "I don't know why I have this now"  GI: Intermittent nausea. Denies v/abd pain.   CV: No CP/SOB  ENDO: No S&Sx of hypoglycemia      MEDS:  azithromycin   Tablet 250 milliGRAM(s) Oral <User Schedule>  cholecalciferol 2000 Unit(s) Oral daily  insulin glargine Injectable (LANTUS) 9 Unit(s) SubCutaneous at bedtime  insulin lispro (HumaLOG) corrective regimen sliding scale   SubCutaneous three times a day before meals  insulin lispro (HumaLOG) corrective regimen sliding scale   SubCutaneous at bedtime  insulin lispro Injectable (HumaLOG) 4 Unit(s) SubCutaneous before breakfast  insulin lispro Injectable (HumaLOG) 3 Unit(s) SubCutaneous with dinner  insulin lispro Injectable (HumaLOG) 4 Unit(s) SubCutaneous before lunch  predniSONE   Tablet 15 milliGRAM(s) Oral daily  theophylline ER Capsule 400 milliGRAM(s) Oral daily      Allergies  No Known Allergies    PE:  General: Female sitting in chair. On BIPAP on and off.   Vital Signs Last 24 Hrs  T(C): 36.6 (01-07-19 @ 12:23), Max: 36.8 (01-06-19 @ 20:29)  T(F): 97.8 (01-07-19 @ 12:23), Max: 98.2 (01-06-19 @ 20:29)  HR: 148 (01-07-19 @ 12:23) (76 - 148)  BP: 111/92 (01-07-19 @ 12:23) (111/92 - 145/95)  BP(mean): --  RR: 18 (01-07-19 @ 12:23) (18 - 20)  SpO2: 100% (01-07-19 @ 13:07) (100% - 100%)  Abd: Soft, NT, ND, obese   Extremities: Warm. LUE edema improved. B/L pedal edema   Neuro: A&O X3    LABS:  POCT Blood Glucose.: 181 mg/dL (01-07-19 @ 12:30)  POCT Blood Glucose.: 230 mg/dL (01-07-19 @ 10:25)  POCT Blood Glucose.: 181 mg/dL (01-07-19 @ 08:07)  POCT Blood Glucose.: 106 mg/dL (01-06-19 @ 21:58)  POCT Blood Glucose.: 83 mg/dL (01-06-19 @ 17:28)  POCT Blood Glucose.: 236 mg/dL (01-06-19 @ 13:47)  POCT Blood Glucose.: 230 mg/dL (01-06-19 @ 12:12)  POCT Blood Glucose.: 211 mg/dL (01-06-19 @ 08:07)  POCT Blood Glucose.: 144 mg/dL (01-05-19 @ 21:19)                          10.9   8.6   )-----------( 352      ( 07 Jan 2019 06:27 )             32.1     01-07    139  |  88<L>  |  23  ----------------------------<  169<H>  3.9   |  39<H>  |  1.16    Ca    9.6      07 Jan 2019 06:27  Mg     2.4     01-07                Hemoglobin A1C, Whole Blood: 7.2 % <H> [4.0 - 5.6] (11-30-18 @ 07:58)

## 2019-01-07 NOTE — PROGRESS NOTE ADULT - ASSESSMENT
ASSESSMENT:    atrial arrythmia with RVR in the setting of severe lung disease, long-term beta-agonist use, elevated sympathetic tone due to critical illness and anxiety, etc - still not rate controlled    ongoing dyspnea with minimal exertion with chronic hypoxic and hypercapnic respiratory failure due to very severe COPD with "exacerbation" - adequate oxygenation on a nasal canula in the setting of profound dyspnea suggests that alterations in the mechanics of breathing due to lung hyperinflation and obesity are likely playing a significant role in her shortness of breath - anxiety is also playing a role - there is evidence of CAD without pulmonary hypertension on the CT scan which is without pulmonary emboli or pneumonia - cardiac catheterization last year revealed patent stents - ECHO has a preserved LVEF, no significant valvular heart disease and no pulmonary hypertension - resolved AURORA, hyperkalemia and hyponatremia - resolved respiratory acidosis on repeat ABG and the pCO2 level has decreased from the 80s back to the 60s - lower extremity weakness likely due to steroid induced myopathy perhaps exacerbated by statins is improving    extremity edema/discomfort likely related to steroid induced fluid retention - no evidence of DVT on repeat upper or lower extremity Duplex examination - resolved upper extremity edema - improving lower extremity edema with bedrest    HTN/HLD/DM    CAD s/p PCI x 6    PAD    PLAN/RECOMMENDATIONS:    felt not to be a CCU candidate  cardiology and EP service recommendations noted - for DCCV   cardiac meds: ASA/eliquis/imdur/lasix/amiodarone/lopressor - off crestor with possible myopathy - off norvasc due to swelling - BP control adequate  diurese as tolerated by renal function and hemodynamics - watch for worsening metabolic alkalosis with loop diuretic use which could lead to worsening hypercapnia  BIPAP 12/5 with 40% FiO2 for sleep - on and off during the day only as a last resort for increased work of breathing or recurrent respiratory acidosis - we need to wean her off daytime BIPAP for transfer to an acute rehabilitation center  oxygen supplementation to keep saturation greater than 92% using a nasal canula when off BIPAP  following ABG  sputum culture - no growth x2 - has completed a course of biaxin  home trilogy vent has been arranged with [facility]   xopenex/atrovent nebs q6h   pulmicort 0.5mg nebs q12h  singulair/theophylline - theophylline level is subtherapeutic - daliresp discontinued  prednisone 15mg daily - decrease by 5mg every 5 days - glucose control - nystatin   azithromycin TIW for antiinflammatory effects  GI/DVT prophylaxis - protoix/eliquis  physical therapy daily  ACE bandage lower extremity wraps  holistic nurse evaluation Monday  transfer to acute rehab when able  outpatient evaluation for lung transplantation    Will follow with you. Plan of care discussed with the patient at bedside and anesthesia. The patient's pulmonary status has been "optimized" over the last several weeks although she is at high risk for respiratory failure with anesthesia. I have discussed this with Dr. Raymundo who will be the anesthesiologist for the procuedure. I have reached out to the [facility] transplant team. They would like the patient transferred to [facility] rehab when stable where they will be able to evaluate the patient for possible lung transplantation and perform appropriate testing as needed.    David Fair MD, [facility] - [phone]  Pulmonary Medicine

## 2019-01-08 LAB
ANION GAP SERPL CALC-SCNC: 12 MMOL/L — SIGNIFICANT CHANGE UP (ref 5–17)
ANISOCYTOSIS BLD QL: SLIGHT — SIGNIFICANT CHANGE UP
BASO STIPL BLD QL SMEAR: SIGNIFICANT CHANGE UP
BASOPHILS # BLD AUTO: 0 K/UL — SIGNIFICANT CHANGE UP (ref 0–0.2)
BASOPHILS NFR BLD AUTO: 0.3 % — SIGNIFICANT CHANGE UP (ref 0–2)
BUN SERPL-MCNC: 27 MG/DL — HIGH (ref 7–23)
CALCIUM SERPL-MCNC: 9.4 MG/DL — SIGNIFICANT CHANGE UP (ref 8.4–10.5)
CHLORIDE SERPL-SCNC: 89 MMOL/L — LOW (ref 96–108)
CO2 SERPL-SCNC: 37 MMOL/L — HIGH (ref 22–31)
CREAT SERPL-MCNC: 1.18 MG/DL — SIGNIFICANT CHANGE UP (ref 0.5–1.3)
DACRYOCYTES BLD QL SMEAR: SLIGHT — SIGNIFICANT CHANGE UP
ELLIPTOCYTES BLD QL SMEAR: SLIGHT — SIGNIFICANT CHANGE UP
EOSINOPHIL # BLD AUTO: 0.1 K/UL — SIGNIFICANT CHANGE UP (ref 0–0.5)
EOSINOPHIL NFR BLD AUTO: 0.9 % — SIGNIFICANT CHANGE UP (ref 0–6)
GLUCOSE BLDC GLUCOMTR-MCNC: 125 MG/DL — HIGH (ref 70–99)
GLUCOSE BLDC GLUCOMTR-MCNC: 139 MG/DL — HIGH (ref 70–99)
GLUCOSE BLDC GLUCOMTR-MCNC: 162 MG/DL — HIGH (ref 70–99)
GLUCOSE BLDC GLUCOMTR-MCNC: 233 MG/DL — HIGH (ref 70–99)
GLUCOSE SERPL-MCNC: 167 MG/DL — HIGH (ref 70–99)
HCT VFR BLD CALC: 29.3 % — LOW (ref 34.5–45)
HGB BLD-MCNC: 10 G/DL — LOW (ref 11.5–15.5)
LYMPHOCYTES # BLD AUTO: 1.8 K/UL — SIGNIFICANT CHANGE UP (ref 1–3.3)
LYMPHOCYTES # BLD AUTO: 18.3 % — SIGNIFICANT CHANGE UP (ref 13–44)
MAGNESIUM SERPL-MCNC: 2 MG/DL — SIGNIFICANT CHANGE UP (ref 1.6–2.6)
MCHC RBC-ENTMCNC: 30.6 PG — SIGNIFICANT CHANGE UP (ref 27–34)
MCHC RBC-ENTMCNC: 33.9 GM/DL — SIGNIFICANT CHANGE UP (ref 32–36)
MCV RBC AUTO: 90.1 FL — SIGNIFICANT CHANGE UP (ref 80–100)
MICROCYTES BLD QL: SLIGHT — SIGNIFICANT CHANGE UP
MONOCYTES # BLD AUTO: 1 K/UL — HIGH (ref 0–0.9)
MONOCYTES NFR BLD AUTO: 10 % — SIGNIFICANT CHANGE UP (ref 2–14)
NEUTROPHILS # BLD AUTO: 7.1 K/UL — SIGNIFICANT CHANGE UP (ref 1.8–7.4)
NEUTROPHILS NFR BLD AUTO: 70.4 % — SIGNIFICANT CHANGE UP (ref 43–77)
PLAT MORPH BLD: NORMAL — SIGNIFICANT CHANGE UP
PLATELET # BLD AUTO: 418 K/UL — HIGH (ref 150–400)
POIKILOCYTOSIS BLD QL AUTO: SLIGHT — SIGNIFICANT CHANGE UP
POLYCHROMASIA BLD QL SMEAR: SLIGHT — SIGNIFICANT CHANGE UP
POTASSIUM SERPL-MCNC: 3.8 MMOL/L — SIGNIFICANT CHANGE UP (ref 3.5–5.3)
POTASSIUM SERPL-SCNC: 3.8 MMOL/L — SIGNIFICANT CHANGE UP (ref 3.5–5.3)
RBC # BLD: 3.25 M/UL — LOW (ref 3.8–5.2)
RBC # FLD: 15 % — HIGH (ref 10.3–14.5)
RBC BLD AUTO: ABNORMAL
SODIUM SERPL-SCNC: 138 MMOL/L — SIGNIFICANT CHANGE UP (ref 135–145)
WBC # BLD: 10.1 K/UL — SIGNIFICANT CHANGE UP (ref 3.8–10.5)
WBC # FLD AUTO: 10.1 K/UL — SIGNIFICANT CHANGE UP (ref 3.8–10.5)

## 2019-01-08 PROCEDURE — 99233 SBSQ HOSP IP/OBS HIGH 50: CPT

## 2019-01-08 RX ORDER — SIMVASTATIN 20 MG/1
10 TABLET, FILM COATED ORAL AT BEDTIME
Qty: 0 | Refills: 0 | Status: DISCONTINUED | OUTPATIENT
Start: 2019-01-08 | End: 2019-01-15

## 2019-01-08 RX ORDER — INSULIN GLARGINE 100 [IU]/ML
8 INJECTION, SOLUTION SUBCUTANEOUS AT BEDTIME
Qty: 0 | Refills: 0 | Status: DISCONTINUED | OUTPATIENT
Start: 2019-01-08 | End: 2019-01-10

## 2019-01-08 RX ORDER — BACITRACIN ZINC 500 UNIT/G
1 OINTMENT IN PACKET (EA) TOPICAL ONCE
Qty: 0 | Refills: 0 | Status: COMPLETED | OUTPATIENT
Start: 2019-01-08 | End: 2019-01-09

## 2019-01-08 RX ORDER — POTASSIUM CHLORIDE 20 MEQ
40 PACKET (EA) ORAL ONCE
Qty: 0 | Refills: 0 | Status: COMPLETED | OUTPATIENT
Start: 2019-01-08 | End: 2019-01-08

## 2019-01-08 RX ADMIN — Medication 100 MILLIGRAM(S): at 06:30

## 2019-01-08 RX ADMIN — Medication 40 MILLIGRAM(S): at 06:29

## 2019-01-08 RX ADMIN — SENNA PLUS 2 TABLET(S): 8.6 TABLET ORAL at 22:43

## 2019-01-08 RX ADMIN — Medication 1 DROP(S): at 16:44

## 2019-01-08 RX ADMIN — Medication 3 UNIT(S): at 17:54

## 2019-01-08 RX ADMIN — Medication 81 MILLIGRAM(S): at 12:52

## 2019-01-08 RX ADMIN — Medication 500 MICROGRAM(S): at 00:15

## 2019-01-08 RX ADMIN — LEVALBUTEROL 0.63 MILLIGRAM(S): 1.25 SOLUTION, CONCENTRATE RESPIRATORY (INHALATION) at 10:58

## 2019-01-08 RX ADMIN — Medication 400 MILLIGRAM(S): at 09:33

## 2019-01-08 RX ADMIN — LEVALBUTEROL 0.63 MILLIGRAM(S): 1.25 SOLUTION, CONCENTRATE RESPIRATORY (INHALATION) at 17:39

## 2019-01-08 RX ADMIN — Medication 0.5 MILLIGRAM(S): at 17:39

## 2019-01-08 RX ADMIN — Medication 2: at 17:54

## 2019-01-08 RX ADMIN — Medication 4 UNIT(S): at 09:33

## 2019-01-08 RX ADMIN — Medication 1 MILLIGRAM(S): at 22:43

## 2019-01-08 RX ADMIN — MONTELUKAST 10 MILLIGRAM(S): 4 TABLET, CHEWABLE ORAL at 12:52

## 2019-01-08 RX ADMIN — PANTOPRAZOLE SODIUM 40 MILLIGRAM(S): 20 TABLET, DELAYED RELEASE ORAL at 06:30

## 2019-01-08 RX ADMIN — Medication 500 MICROGRAM(S): at 17:43

## 2019-01-08 RX ADMIN — Medication 2000 UNIT(S): at 12:53

## 2019-01-08 RX ADMIN — AMIODARONE HYDROCHLORIDE 400 MILLIGRAM(S): 400 TABLET ORAL at 06:30

## 2019-01-08 RX ADMIN — Medication 500 MICROGRAM(S): at 10:57

## 2019-01-08 RX ADMIN — Medication 500 MICROGRAM(S): at 06:04

## 2019-01-08 RX ADMIN — SIMVASTATIN 10 MILLIGRAM(S): 20 TABLET, FILM COATED ORAL at 22:43

## 2019-01-08 RX ADMIN — Medication 100 MILLIGRAM(S): at 22:43

## 2019-01-08 RX ADMIN — Medication 1 TABLET(S): at 12:53

## 2019-01-08 RX ADMIN — Medication 1 DROP(S): at 22:45

## 2019-01-08 RX ADMIN — APIXABAN 5 MILLIGRAM(S): 2.5 TABLET, FILM COATED ORAL at 15:44

## 2019-01-08 RX ADMIN — APIXABAN 5 MILLIGRAM(S): 2.5 TABLET, FILM COATED ORAL at 06:30

## 2019-01-08 RX ADMIN — Medication 1 DROP(S): at 06:30

## 2019-01-08 RX ADMIN — Medication 0.5 MILLIGRAM(S): at 06:04

## 2019-01-08 RX ADMIN — Medication 1: at 09:34

## 2019-01-08 RX ADMIN — Medication 500000 UNIT(S): at 06:30

## 2019-01-08 RX ADMIN — Medication 500000 UNIT(S): at 00:15

## 2019-01-08 RX ADMIN — INSULIN GLARGINE 8 UNIT(S): 100 INJECTION, SOLUTION SUBCUTANEOUS at 22:41

## 2019-01-08 RX ADMIN — Medication 15 MILLIGRAM(S): at 06:29

## 2019-01-08 RX ADMIN — ISOSORBIDE MONONITRATE 30 MILLIGRAM(S): 60 TABLET, EXTENDED RELEASE ORAL at 12:53

## 2019-01-08 RX ADMIN — AZITHROMYCIN 250 MILLIGRAM(S): 500 TABLET, FILM COATED ORAL at 09:33

## 2019-01-08 RX ADMIN — LEVALBUTEROL 0.63 MILLIGRAM(S): 1.25 SOLUTION, CONCENTRATE RESPIRATORY (INHALATION) at 06:04

## 2019-01-08 RX ADMIN — Medication 40 MILLIEQUIVALENT(S): at 12:53

## 2019-01-08 RX ADMIN — Medication 4 UNIT(S): at 12:53

## 2019-01-08 RX ADMIN — Medication 100 MILLIGRAM(S): at 12:53

## 2019-01-08 NOTE — PROGRESS NOTE ADULT - ASSESSMENT
58 F PMH COPD on home O2 3L, HTN, HLD, CAD s/p x6 stents, T2DM, pHTN, PVD, p/w progressive worsening dyspnea x 2 weeks now complicated by chronic progressive hypoxic respiratory failure.     1. New onset AFIB  S/P successful DCCV, remains NSR    EVAN with no evidence of atrial thrombus   continue amio, cardizem   CHADSVASC 4 continue Eliquis 5mg PO BID  EP f/u     2. Chronic progressive hypoxic respiratory failure  multifactorial in the setting of worsening COPD, CAD, chronic diastolic CHF, pulmonary HTN   bl feet edema improving, continue lasix 40mg PO daily   UE/ LE dopplers negative for DVT   echo with normal LV function, mild diastolic dysfunction, inadequate tricuspid regurg   cv stable no chest pain or sob   bipap PRN  continue dueonebs, inhaled steroids, singulair/theophylline/daliresp, pulm f/u   EKG reviewed, corrected QT WNL (aprox 462), on azithromycin    3. CAD s/p PCI  cv stable, continue asa, imdur    4. COPD   on bipap  continue Duoneb inhaled steroids, singulair/theophylline/daliresp, pulm f/u     5. hyponatremia/hyperkalemia  trend bmp, med f/u    dvt ppx   pending dispo to pulm rehab

## 2019-01-08 NOTE — PROGRESS NOTE ADULT - SUBJECTIVE AND OBJECTIVE BOX
Patient is a 58y old  Female who presents with a chief complaint of dyspnea (08 Jan 2019 11:48)        SUBJECTIVE / OVERNIGHT EVENTS:  overnight no acute events.  denies complaints. states breathing is stable.  denies palpitations, n/v/f/chills, cp, abd pain.    CAPILLARY BLOOD GLUCOSE  POCT Blood Glucose.: 139 mg/dL (08 Jan 2019 12:38)  POCT Blood Glucose.: 162 mg/dL (08 Jan 2019 09:22)  POCT Blood Glucose.: 240 mg/dL (07 Jan 2019 22:49)  POCT Blood Glucose.: 81 mg/dL (07 Jan 2019 18:30)    I&O's Summary    Vital Signs Last 24 Hrs  T(C): 36.6 (08 Jan 2019 06:06), Max: 37.1 (07 Jan 2019 17:49)  T(F): 97.9 (08 Jan 2019 06:06), Max: 98.8 (07 Jan 2019 17:49)  HR: 75 (08 Jan 2019 12:49) (67 - 88)  BP: 150/84 (08 Jan 2019 12:49) (137/77 - 153/72)  BP(mean): --  RR: 18 (08 Jan 2019 12:49) (18 - 18)  SpO2: 100% (08 Jan 2019 12:49) (100% - 100%)    PHYSICAL EXAM:  GENERAL:  Well appearing, obese F, in NAD, on nasal cannula 5L  HEAD:  NCAT  EYES: PERRLA, conjunctiva clear  NECK: Supple  CHEST/LUNG: CTA B/L w/ decr breath sounds  HEART: Reg rate. Normal S1, S2. No m/r/g.   ABDOMEN: SNTND. Bowel sounds present  EXTREMITIES:  2+ Peripheral Pulses, No clubbing, cyanosis  2+ pitting LE edema b/l up to knees  PSYCH: defensive mood    LABS:                        10.0   10.1  )-----------( 418      ( 08 Jan 2019 06:33 )             29.3     01-08    138  |  89<L>  |  27<H>  ----------------------------<  167<H>  3.8   |  37<H>  |  1.18    Ca    9.4      08 Jan 2019 06:33  Mg     2.0     01-08    RADIOLOGY & ADDITIONAL TESTS:  Consultant(s) Notes Reviewed:  pulm, cards, EP  Care Discussed with Consultants/Other Providers: Floor NP    MEDICATIONS  (STANDING):  apixaban 5 milliGRAM(s) Oral every 12 hours  artificial tears (preservative free) Ophthalmic Solution 1 Drop(s) Both EYES three times a day  aspirin enteric coated 81 milliGRAM(s) Oral daily  azithromycin   Tablet 250 milliGRAM(s) Oral <User Schedule>  Biotene Dry Mouth Oral Rinse 5 milliLiter(s) Swish and Spit two times a day  buDESOnide   0.5 milliGRAM(s) Respule 0.5 milliGRAM(s) Inhalation every 12 hours  cholecalciferol 2000 Unit(s) Oral daily  dextrose 5%. 1000 milliLiter(s) (50 mL/Hr) IV Continuous <Continuous>  dextrose 50% Injectable 12.5 Gram(s) IV Push once  dextrose 50% Injectable 25 Gram(s) IV Push once  dextrose 50% Injectable 25 Gram(s) IV Push once  diltiazem    Tablet 60 milliGRAM(s) Oral every 6 hours  docusate sodium 100 milliGRAM(s) Oral three times a day  furosemide    Tablet 40 milliGRAM(s) Oral daily  insulin glargine Injectable (LANTUS) 9 Unit(s) SubCutaneous at bedtime  insulin lispro (HumaLOG) corrective regimen sliding scale   SubCutaneous three times a day before meals  insulin lispro (HumaLOG) corrective regimen sliding scale   SubCutaneous at bedtime  insulin lispro Injectable (HumaLOG) 4 Unit(s) SubCutaneous before breakfast  insulin lispro Injectable (HumaLOG) 3 Unit(s) SubCutaneous with dinner  insulin lispro Injectable (HumaLOG) 4 Unit(s) SubCutaneous before lunch  ipratropium    for Nebulization 500 MICROGram(s) Nebulizer every 6 hours  isosorbide   mononitrate ER Tablet (IMDUR) 30 milliGRAM(s) Oral daily  levalbuterol Inhalation 0.63 milliGRAM(s) Inhalation every 6 hours  melatonin 1 milliGRAM(s) Oral at bedtime  montelukast 10 milliGRAM(s) Oral daily  multivitamin 1 Tablet(s) Oral daily  pantoprazole    Tablet 40 milliGRAM(s) Oral before breakfast  polyethylene glycol 3350 17 Gram(s) Oral daily  predniSONE   Tablet   Oral   senna 2 Tablet(s) Oral at bedtime  simethicone 80 milliGRAM(s) Chew once  theophylline ER Capsule 400 milliGRAM(s) Oral daily    MEDICATIONS  (PRN):  aluminum hydroxide/magnesium hydroxide/simethicone Suspension 30 milliLiter(s) Oral every 6 hours PRN Dyspepsia  dextrose 40% Gel 15 Gram(s) Oral once PRN Blood Glucose LESS THAN 70 milliGRAM(s)/deciliter  glucagon  Injectable 1 milliGRAM(s) IntraMuscular once PRN Glucose LESS THAN 70 milligrams/deciliter  ondansetron Injectable 4 milliGRAM(s) IV Push every 6 hours PRN Nausea and/or Vomiting  petrolatum Ophthalmic Ointment 1 Application(s) Both EYES at bedtime PRN dry eyes  sodium chloride 0.65% Nasal 1 Spray(s) Both Nostrils two times a day PRN Nasal Congestion      Home Medications:  Crestor 10 mg oral tablet: 1 tab(s) orally once a day (at bedtime) (29 Nov 2018 22:22)

## 2019-01-08 NOTE — PROGRESS NOTE ADULT - SUBJECTIVE AND OBJECTIVE BOX
CARDIOLOGY FOLLOW UP - Dr. Brunson    CC "im resting, i feel fine"       PHYSICAL EXAM:  T(C): 36.6 (01-08-19 @ 06:06), Max: 37.1 (01-07-19 @ 17:49)  HR: 70 (01-08-19 @ 11:05) (67 - 148)  BP: 153/72 (01-08-19 @ 06:06) (111/92 - 153/72)  RR: 18 (01-08-19 @ 06:06) (18 - 18)  SpO2: 100% (01-08-19 @ 11:05) (100% - 100%)  Wt(kg): --  I&O's Summary      Appearance: Normal	  Cardiovascular: Normal S1 S2,RRR, No JVD, No murmurs  Respiratory: Lungs clear to auscultation	  Gastrointestinal:  Soft, Non-tender, + BS	  Extremities: Normal range of motion, No clubbing, b/l LE edema         MEDICATIONS  (STANDING):  amiodarone    Tablet 400 milliGRAM(s) Oral two times a day  apixaban 5 milliGRAM(s) Oral every 12 hours  artificial tears (preservative free) Ophthalmic Solution 1 Drop(s) Both EYES three times a day  aspirin enteric coated 81 milliGRAM(s) Oral daily  azithromycin   Tablet 250 milliGRAM(s) Oral <User Schedule>  Biotene Dry Mouth Oral Rinse 5 milliLiter(s) Swish and Spit two times a day  buDESOnide   0.5 milliGRAM(s) Respule 0.5 milliGRAM(s) Inhalation every 12 hours  cholecalciferol 2000 Unit(s) Oral daily  dextrose 5%. 1000 milliLiter(s) (50 mL/Hr) IV Continuous <Continuous>  dextrose 50% Injectable 12.5 Gram(s) IV Push once  dextrose 50% Injectable 25 Gram(s) IV Push once  dextrose 50% Injectable 25 Gram(s) IV Push once  diltiazem    Tablet 30 milliGRAM(s) Oral every 6 hours  docusate sodium 100 milliGRAM(s) Oral three times a day  furosemide    Tablet 40 milliGRAM(s) Oral daily  insulin glargine Injectable (LANTUS) 9 Unit(s) SubCutaneous at bedtime  insulin lispro (HumaLOG) corrective regimen sliding scale   SubCutaneous three times a day before meals  insulin lispro (HumaLOG) corrective regimen sliding scale   SubCutaneous at bedtime  insulin lispro Injectable (HumaLOG) 4 Unit(s) SubCutaneous before breakfast  insulin lispro Injectable (HumaLOG) 3 Unit(s) SubCutaneous with dinner  insulin lispro Injectable (HumaLOG) 4 Unit(s) SubCutaneous before lunch  ipratropium    for Nebulization 500 MICROGram(s) Nebulizer every 6 hours  isosorbide   mononitrate ER Tablet (IMDUR) 30 milliGRAM(s) Oral daily  levalbuterol Inhalation 0.63 milliGRAM(s) Inhalation every 6 hours  melatonin 1 milliGRAM(s) Oral at bedtime  montelukast 10 milliGRAM(s) Oral daily  multivitamin 1 Tablet(s) Oral daily  pantoprazole    Tablet 40 milliGRAM(s) Oral before breakfast  polyethylene glycol 3350 17 Gram(s) Oral daily  potassium chloride    Tablet ER 40 milliEquivalent(s) Oral once  predniSONE   Tablet   Oral   senna 2 Tablet(s) Oral at bedtime  simethicone 80 milliGRAM(s) Chew once  theophylline ER Capsule 400 milliGRAM(s) Oral daily      TELEMETRY: 	    ECG:  	s/p Cardioversion yesterday , SR-SB  -Today pt. remains NSR   RADIOLOGY:   DIAGNOSTIC TESTING:  [ ] Echocardiogram: < from: Transesophageal Echocardiogram w/o TTE (01.07.19 @ 18:04) >  Conclusions:  1. No left atrial or left atrial appendage thrombus.  Normal left atrial appendage function (velocity= 1.4 m/s).  2. Unable to assess LV systolic function in the setting of  atrial fibrillation with rapid ventricular response.  3. The right ventricle is not well visualized; grossly  normal right ventricular systolic function.    < end of copied text >    [ ]  Catheterization:  [ ] Stress Test:    OTHER: 	    LABS:	 	                                10.0   10.1  )-----------( 418      ( 08 Jan 2019 06:33 )             29.3     01-08    138  |  89<L>  |  27<H>  ----------------------------<  167<H>  3.8   |  37<H>  |  1.18    Ca    9.4      08 Jan 2019 06:33  Mg     2.4     01-07

## 2019-01-08 NOTE — PROGRESS NOTE ADULT - PROBLEM SELECTOR PLAN 5
Echo with  mild diastolic dysfunction, no significant changes from prior study in 2014.  - c/w po lasix 40mg qd  - holding home ARB - bp controlled

## 2019-01-08 NOTE — PROGRESS NOTE ADULT - ASSESSMENT
59 yo F PMH COPD, chronic hypoxic resp failure on home O2 3L, HTN, HLD, CAD s/p 6 stents, dCHF, T2DM, PVD, p/w progressive worsening dyspnea x 2 weeks a/w COPD exacerbation, acute on chronic hypoxic/hypercarbic respiratory failure, w/ dependency on bipap. With hosp course c/b new onset afib RVR s/p successful CV, titrating meds.

## 2019-01-08 NOTE — PROGRESS NOTE ADULT - ASSESSMENT
59 yo obese white female former heavy smoker with a PMH anxiety Claustrophobia Raynaud phenomenon, emphysema COPD on home O2 3L 24hr/7 days,T2DM,  PVD HTN, HLD, CAD s/p x6 stents?, who was admitted on 11/29/18 for progressive worsening dyspnea. RRT called 1/6/19 for AF with RVR, s/p multiple IVP of diltiazem, metoprolol and  amiodarone bolus 150mg IVP x 1, cardizem infusion now on oral amiodarone load TTE with normal LV systolic function in Dec 2018. S/P successful DCCV 1/7 remains in SR. 57 yo obese white female former heavy smoker with a PMH anxiety Claustrophobia Raynaud phenomenon, emphysema COPD on home O2 3L 24hr/7 days,T2DM,  PVD HTN, HLD, CAD s/p x6 stents?, who was admitted on 11/29/18 for progressive worsening dyspnea. RRT called 1/6/19 for AF with RVR, s/p multiple IVP of diltiazem, metoprolol and  amiodarone bolus 150mg IVP x 1, cardizem infusion, now on oral amiodarone load TTE with normal LV systolic function in Dec 2018. S/P successful DCCV 1/7 remains in SR.

## 2019-01-08 NOTE — PROGRESS NOTE ADULT - PROBLEM SELECTOR PLAN 1
afib RVR s/p amiodarone, switched to CCB  s/p successful CV   - no longer on BB, switched to diltiazem 60 q6h per EP  - monitor on tele  - no longer on amiodarone given lung disease  - c/w apixaban tolerating well

## 2019-01-08 NOTE — PROGRESS NOTE ADULT - PROBLEM SELECTOR PLAN 1
Monitor Telemetry remains in SR  Keep K+>4, MG++>2  [id number] 4 continue Eliquis 5mg PO BID  continue diltiazem oral up titrate as B/P permits keep HR 60's    [id number] S/P successful DCCV 1/7 remains in SR.   Monitor Telemetry   Keep K+>4, MG++>2  [id number] 4 continue Eliquis 5mg PO BID  continue diltiazem oral up titrate as B/P permits keep HR 60's  continue with Amiodarone load for short term use only, 1.2 gm so far, call placed to [facility] lung transplant team spoke with Transplant pulmonologist, Dr Mattson, who states Amiodarone use is ok for now, does not interfere with lung transplant workup. Would need to be discontinued when lung transplant planned.   [id number] S/P successful DCCV 1/7 remains in SR.   Monitor Telemetry   Keep K+>4, MG++>2  [id number] 4 continue Eliquis 5mg PO BID  continue diltiazem oral up titrate as B/P permits keep HR 60's  continue with Amiodarone load for short term use only, 1.2 gm so far, call placed to [facility] lung transplant team spoke with Transplant pulmonologist, Dr Mattson, who states Amiodarone use is ok for now, does not interfere with lung transplant workup. Would need to be discontinued when lung transplant planned.   [id number]  12:04 addendum after discussion with EP attending discontinue Amiodarone titrate diltiazem dose to avoid any potential issues with lung disease S/P successful DCCV 1/7 remains in SR.   Monitor Telemetry   Keep K+>4, MG++>2  [id number] 4 continue Eliquis 5mg PO BID  continue diltiazem oral up titrate as B/P permits keep HR 60's  continue with Amiodarone load for short term use only, 1.2 gm so far, call placed to [facility] lung transplant team spoke with Transplant pulmonologist, Dr Mattson, who states Amiodarone use is ok for now, does not interfere with lung transplant workup. Would need to be discontinued when lung transplant planned.     12:04 addendum after discussion with EP attending discontinue Amiodarone titrate diltiazem dose to avoid any potential issues with lung disease  EP signing off  [id number] S/P successful DCCV 1/7 remains in SR.   Monitor Telemetry   Keep K+>4, MG++>2  [id number] 4 continue Eliquis 5mg PO BID  continue diltiazem oral up titrate as B/P permits keep HR 60's  continue with Amiodarone load for short term use only, 1.2 gm so far, call placed to [facility] lung transplant team spoke with Transplant pulmonologist, Dr Mattson, who states Amiodarone use is ok for now, does not interfere with lung transplant workup. Would need to be discontinued when lung transplant planned.     12:04 addendum after discussion with EP attending discontinue Amiodarone titrate diltiazem dose to avoid any potential issues with lung disease  diltiazem 60 mg QID, can switch to long acting at discharge if tolerated   EP signing off  [id number]

## 2019-01-08 NOTE — PROGRESS NOTE ADULT - ASSESSMENT
ASSESSMENT:    atrial arrythmias with RVR in the setting of severe lung disease, long-term beta-agonist use, elevated sympathetic tone due to critical illness and anxiety - s/p successful DCCV    ongoing dyspnea with minimal exertion with chronic hypoxic and hypercapnic respiratory failure due to very severe COPD with "exacerbation" - adequate oxygenation on a nasal canula in the setting of profound dyspnea suggests that alterations in the mechanics of breathing due to lung hyperinflation and obesity are likely playing a significant role in her shortness of breath - anxiety is also playing a role - there is evidence of CAD without pulmonary hypertension on the CT scan which is without pulmonary emboli or pneumonia - cardiac catheterization last year revealed patent stents - ECHO has a preserved LVEF, no significant valvular heart disease and no pulmonary hypertension - resolved AURORA, hyperkalemia and hyponatremia - resolved respiratory acidosis on repeat ABG and the pCO2 level has decreased from the 80s back to the 60s - lower extremity weakness likely due to steroid induced myopathy perhaps exacerbated by statins is improving    extremity edema/discomfort likely related to steroid induced fluid retention - no evidence of DVT on repeat upper or lower extremity Duplex examination - resolved upper extremity edema - improving lower extremity edema with bedrest    HTN/HLD/DM    CAD s/p PCI x 6    PAD    PLAN/RECOMMENDATIONS:    BIPAP 12/5 with 40% FiO2 for sleep - on and off during the day only as a last resort for increased work of breathing or recurrent respiratory acidosis - we need to wean her off daytime BIPAP for transfer to an acute rehabilitation center  oxygen supplementation to keep saturation greater than 92% using a nasal canula when off BIPAP  following ABG  sputum culture - no growth x2 - has completed a course of biaxin  home trilogy vent has been arranged with [facility]   xopenex/atrovent nebs q6h   pulmicort 0.5mg nebs q12h  singulair/theophylline - theophylline level is subtherapeutic - daliresp discontinued  prednisone 15mg daily - decrease by 5mg every 5 days - glucose control - nystatin   azithromycin TIW for antiinflammatory effects  await cardiology and EP service recommendations   cardiac meds: ASA/eliquis/imdur/lasix/amiodarone/diltiazem - off crestor with possible myopathy - off norvasc due to swelling - BP control adequate - please avoid beta blockers - hopefully the course of amiodarone can be limited in duration  diurese as tolerated by renal function and hemodynamics - watch for worsening metabolic alkalosis with loop diuretic use which could lead to worsening hypercapnia  GI/DVT prophylaxis - protoix/eliquis  physical therapy daily  ACE bandage lower extremity wraps  transfer to acute rehab when able  outpatient evaluation for lung transplantation    Will follow with you. Plan of care discussed with the patient at bedside, the dedicated floor NP and the . I have reached out to the [facility] transplant team. They would like the patient transferred to [facility] acute rehab when stable where they will be able to evaluate the patient for possible lung transplantation and perform appropriate testing as needed.    David Fair MD, [facility] - [phone]  Pulmonary Medicine

## 2019-01-08 NOTE — PROGRESS NOTE ADULT - PROBLEM SELECTOR PLAN 4
A1C 7.2, controlled  - c/w Lantus 9 units QHS  - c/w Humalog  4-4-3 w/meals   - Continue to monitor blood sugars.  - Endocrine following

## 2019-01-08 NOTE — PROGRESS NOTE ADULT - PROBLEM SELECTOR PLAN 3
on home O2 3L.   - Continue COPD management as above.  - Continue on BIPAP bedtime  - per pulm  - pending lung transplant eval w/ [facility]

## 2019-01-08 NOTE — PROGRESS NOTE ADULT - PROBLEM SELECTOR PLAN 2
Completed 7 days of biaxin.   - Home Trilogy vent arranged by pulmonary  - c/w bipap at night and PRN  - Plan for discharge to acute pulmonary rehab  - c/w Prednisone taper per pulm, Pulmicort & Duoneb nebs  - c/w Theophylline, Singulair

## 2019-01-08 NOTE — PROGRESS NOTE ADULT - SUBJECTIVE AND OBJECTIVE BOX
[facility] - [facility]     PROGRESS NOTE    CHIEF COMPLAINT: chronic hypoxic/hypercapnic respiratory failure; COPD exacerbation; emphysema; dyspnea; obesity; atrial fibrillation with RVR    INTERVAL HISTORY: s/p successful DCCV yesterday - remains in NSR; slept in bed with BIPAP; off BIPAP all day yesterday without shortness of breath on a nasal canula @ 4lpm; ABG with improved hypercapnia without acidemia; arm swelling nearly resolved on diuretics and reduced dose of steroids; resolved AURORA, hyperkalemia and hyponatremia; no cough, sputum production, chest congestion or wheeze; no fevers, chills or sweats; no chest pain/pressure or palpitations; decreased lower extremity swelling in bed     REVIEW OF SYSTEMS:  Constitutional: As per interval history  HEENT: Within normal limits  CV: As per interval history  Resp: As per interval history  GI: Within normal limits   : Within normal limits  Musculoskeletal: improving lower extremity weakness   Skin: Within normal limits  Neurological: Within normal limits  Psychiatric: frustrated   Endocrine: Within normal limits  Hematologic/Lymphatic: Within normal limits  Allergic/Immunologic: Within normal limits      MEDICATIONS:     Pulmonary "  buDESOnide   0.5 milliGRAM(s) Respule 0.5 milliGRAM(s) Inhalation every 12 hours  ipratropium    for Nebulization 500 MICROGram(s) Nebulizer every 6 hours  levalbuterol Inhalation 0.63 milliGRAM(s) Inhalation every 6 hours  montelukast 10 milliGRAM(s) Oral daily  theophylline ER Capsule 400 milliGRAM(s) Oral daily      Anti-microbials:  azithromycin   Tablet 250 milliGRAM(s) Oral <User Schedule>  nystatin    Suspension 889813 Unit(s) Oral four times a day      Cardiovascular:  amiodarone    Tablet 400 milliGRAM(s) Oral two times a day  diltiazem    Tablet 30 milliGRAM(s) Oral every 6 hours  furosemide    Tablet 40 milliGRAM(s) Oral daily  isosorbide   mononitrate ER Tablet (IMDUR) 30 milliGRAM(s) Oral daily      Other:  aluminum hydroxide/magnesium hydroxide/simethicone Suspension 30 milliLiter(s) Oral every 6 hours PRN  apixaban 5 milliGRAM(s) Oral every 12 hours  artificial tears (preservative free) Ophthalmic Solution 1 Drop(s) Both EYES three times a day  aspirin enteric coated 81 milliGRAM(s) Oral daily  Biotene Dry Mouth Oral Rinse 5 milliLiter(s) Swish and Spit two times a day  bisacodyl Suppository 10 milliGRAM(s) Rectal daily PRN  cholecalciferol 2000 Unit(s) Oral daily  dextrose 40% Gel 15 Gram(s) Oral once PRN  dextrose 5%. 1000 milliLiter(s) IV Continuous <Continuous>  dextrose 50% Injectable 12.5 Gram(s) IV Push once  dextrose 50% Injectable 25 Gram(s) IV Push once  dextrose 50% Injectable 25 Gram(s) IV Push once  docusate sodium 100 milliGRAM(s) Oral three times a day  glucagon  Injectable 1 milliGRAM(s) IntraMuscular once PRN  insulin glargine Injectable (LANTUS) 9 Unit(s) SubCutaneous at bedtime  insulin lispro (HumaLOG) corrective regimen sliding scale   SubCutaneous three times a day before meals  insulin lispro (HumaLOG) corrective regimen sliding scale   SubCutaneous at bedtime  insulin lispro Injectable (HumaLOG) 4 Unit(s) SubCutaneous before breakfast  insulin lispro Injectable (HumaLOG) 3 Unit(s) SubCutaneous with dinner  insulin lispro Injectable (HumaLOG) 4 Unit(s) SubCutaneous before lunch  melatonin 1 milliGRAM(s) Oral at bedtime  multivitamin 1 Tablet(s) Oral daily  ondansetron Injectable 4 milliGRAM(s) IV Push every 6 hours PRN  pantoprazole    Tablet 40 milliGRAM(s) Oral before breakfast  petrolatum Ophthalmic Ointment 1 Application(s) Both EYES at bedtime PRN  polyethylene glycol 3350 17 Gram(s) Oral daily  predniSONE   Tablet   Oral   senna 2 Tablet(s) Oral at bedtime  simethicone 80 milliGRAM(s) Chew once  sodium chloride 0.65% Nasal 1 Spray(s) Both Nostrils two times a day PRN        OBJECTIVE:    POCT Blood Glucose.: 162 mg/dL (08 Jan 2019 09:22)  POCT Blood Glucose.: 240 mg/dL (07 Jan 2019 22:49)  POCT Blood Glucose.: 81 mg/dL (07 Jan 2019 18:30)  POCT Blood Glucose.: 181 mg/dL (07 Jan 2019 12:30)      PHYSICAL EXAM:       ICU Vital Signs Last 24 Hrs  T(C): 36.6 (08 Jan 2019 06:06), Max: 37.1 (07 Jan 2019 17:49)  T(F): 97.9 (08 Jan 2019 06:06), Max: 98.8 (07 Jan 2019 17:49)  HR: 73 (08 Jan 2019 06:06) (67 - 148)  BP: 153/72 (08 Jan 2019 06:06) (111/92 - 153/72)  BP(mean): --  ABP: --  ABP(mean): --  RR: 18 (08 Jan 2019 06:06) (18 - 18)  SpO2: 100% (08 Jan 2019 06:06) (100% - 100%) on 4lpm nasal canula    General: Awake. Alert. Cooperative. No distress. Appears stated age. Obese. Cushingoid.  HEENT:  Atraumatic. Moonlike facies. Anicteric. Normal oral mucosa. PERRL. EOMI.   Neck: Supple. Trachea midline. Thyroid without enlargement/tenderness/nodules. No carotid bruit. No JVD.	  Cardiovascular: Regular rate and rhythm. Distant S1 S2. No murmurs, rubs or gallops.  Respiratory: Respirations unlabored. Markedly decreased breath sounds throughout. No wheeze. Faint right basilar rales. No curvature.  Abdomen: Soft. Non-tender. Non-distended. No organomegaly. No masses. Normal bowel sounds. Obese.  Extremities: Warm to touch. No clubbing or cyanosis. Mild bilateral lower extremity edema up to the thigh. Upper extremity swelling resolved.   Pulses: Decreased lower extremity peripheral pulses.  Skin: No rashes or lesions. No ecchymoses. No cyanosis. Warm to touch.   Lymph Nodes: Cervical, supraclavicular and axillary nodes normal  Neurological: Motor and sensory examination equal and normal. A and O x 3  Psychiatry: Frustrated    LABS:                        10.0   10.1  )-----------( 418      ( 08 Jan 2019 06:33 )             29.3                         10.9   8.6   )-----------( 352      ( 07 Jan 2019 06:27 )             32.1     01-08    138  |  89<L>  |  27<H>  ----------------------------<  167<H>  3.8   |  37<H>  |  1.18    01-07    139  |  88<L>  |  23  ----------------------------<  169<H>  3.9   |  39<H>  |  1.16    Ca      9.4      01-08    Ca      9.6      01-07    Phos    3.8     01-05      Mg       2.4     01-07    Mg       1.9     01-05    TPro  6.4  /  Alb  3.8  /  TBili  0.3  /  DBili  x   /  AST  17  /  ALT  44  /  AlkPhos  52  01-05    ABG - ( 06 Jan 2019 06:44 )  pH: 7.39  /  pCO2: 68    /  pO2: 99    / HCO3: 40    / Base Excess: 13.3  /  SaO2: 98        ABG - ( 03 Jan 2019 06:40 )  pH: 7.44  /  pCO2: 65    /  pO2: 152   / HCO3: 43    / Base Excess: 15.9  /  SaO2: 97        ABG - ( 01 Jan 2019 03:44 )  pH: 7.42  /  pCO2: 69    /  pO2: 165   / HCO3: 44    / Base Excess: 17.3  /  SaO2: 97        ABG - ( 31 Dec 2018 16:19 )  pH: 7.38  /  pCO2: 84    /  pO2: 26    / HCO3: 48    / Base Excess: 20.2  /  SaO2: 41        ABG - ( 18 Dec 2018 13:23 )  pH: 7.46  /  pCO2: 47    /  pO2: 88    / HCO3: 33    / Base Excess: 8.0   /  SaO2: 97        ABG - ( 16 Dec 2018 20:53 )  pH: 7.44  /  pCO2: 53    /  pO2: 173   / HCO3: 36    / Base Excess: 10.0  /  SaO2: 100       ABG - ( 13 Dec 2018 06:29 )  pH: 7.30  /  pCO2: 71    /  pO2: 182   / HCO3: 34    / Base Excess: 5.8   /  SaO2: 99        ABG - ( 13 Dec 2018 00:59 )  pH: 7.32  /  pCO2: 71    /  pO2: 75    / HCO3: 35    / Base Excess: 7.1   /  SaO2: 95        ABG - ( 11 Dec 2018 11:45 )  pH: 7.39  /  pCO2: 54    /  pO2: 98    / HCO3: 32    / Base Excess: 6.2   /  SaO2: 98        ABG - ( 09 Dec 2018 11:26 )  pH: 7.35  /  pCO2: 63    /  pO2: 145   / HCO3: 34    / Base Excess: 6.6   /  SaO2: 99      ABG - ( 09 Dec 2018 00:28 )  pH: 7.32  /  pCO2: 71    /  pO2: 124   / HCO3: 36    / Base Excess: 7.6   /  SaO2: 99        ABG - ( 08 Dec 2018 20:50 )  pH: 7.32  /  pCO2: 72    /  pO2: 80    / HCO3: 36    / Base Excess: 7.7   /  SaO2: 95        Serum Pro-Brain Natriuretic Peptide: 254 pg/mL (12-13 @ 14:00)    < from: Transthoracic Echocardiogram (12.07.18 @ 08:59) >    Patient name: GUY HARTMAN  YOB: 1960   Age: 58 (F)   MR#: [id number]  Study Date: 12/7/2018  Location: [address] [place], [place] [id number]: Laquita Armando [facility]  Study quality: Technically good  Referring Physician: Allen ingram MD  BloodPressure: 120/80 mmHg  Height: 163 cm  Weight: 88 kg  BSA: 1.9 m2  ------------------------------------------------------------------------  PROCEDURE: Transthoracic echocardiogram with 2-D, M-Mode  and complete spectral and color flow Doppler.  INDICATION: Other forms of dyspnea (R06.09)  ------------------------------------------------------------------------  Dimensions:    Normal Values:  LA:     3.6    2.0 - 4.0 cm  Ao:     2.9    2.0 - 3.8 cm  SEPTUM: 0.9    0.6 - 1.2 cm  PWT:    0.8    0.6- 1.1 cm  LVIDd:  4.9    3.0 - 5.6 cm  LVIDs:  2.9    1.8 - 4.0 cm  Derived variables:  LVMI: 73 g/m2  RWT: 0.32  Fractional short: 40 %  EF (Teicholtz): 71 %  Doppler Peak Velocity (m/sec): AoV=1.2  ------------------------------------------------------------------------  Observations:  Mitral Valve: Normal mitral valve.  Aortic Valve/Aorta: Normal trileaflet aortic valve. Peak  transaortic valve gradient equals 6 mm Hg, mean transaortic  valve gradient equals 3 mm Hg, aortic valve velocity time  integral equals 22 cm. Trace aortic regurgitation.  Aortic Root: 2.9 cm.  Left Atrium: Normal left atrium.  LA volume index = 18  cc/m2.  Left Ventricle: Normal left ventricular systolic function.  No segmental wall motion abnormalities. Normal left  ventricular internal dimensions and wall thicknesses. Mild  diastolic dysfunction (Stage I).  Right Heart: Normal right atrium. Normal right ventricular  size and function. Normal tricuspid valve. Minimal  tricuspid regurgitation. Normal pulmonic valve.  Pericardium/Pleura: Normal pericardium with no pericardial  effusion.  Hemodynamic: Estimated right atrial pressure is 8 mm Hg.  Inadequate tricuspid regurgitation Doppler envelope  precludes estimation of RVSP.  ------------------------------------------------------------------------  Conclusions:  1. Normal mitral valve.  2. Normal trileaflet aortic valve. Peak transaortic valve  gradient equals 6 mm Hg, mean transaortic valve gradient  equals 3 mm Hg, aortic valve velocity time integral equals  22 cm. Trace aortic regurgitation.  3. Aortic Root: 2.9 cm.  4. Normal left atrium.  LA volume index = 18 cc/m2.  5. Normal left ventricular internal dimensions and wall  thicknesses.  6. Normal left ventricular systolic function. No segmental  wall motion abnormalities.  7. Mild diastolic dysfunction (Stage I).  8. Normal right ventricular size and function.  9. Inadequate tricuspid regurgitation Doppler envelope  precludes estimation of RVSP.  10. Normal tricuspid valve. Minimal tricuspid  regurgitation.  *** Compared with echocardiogram report of 2/18/2014, no  significant changes noted.  ------------------------------------------------------------------------  Confirmed on  12/7/2018 - 13:49:43 by Shefali Gómez M.D.  ------------------------------------------------------------------------    < end of copied text >  ------------------------------------------------------------------------------------------------  MICROBIOLOGY:     Culture - Sputum . (12.07.18 @ 08:34)    Gram Stain:   Rare polymorphonuclear leukocytes per low power field  Rare Squamous epithelial cells per low power field  Numerous Gram Positive Cocci in Pairs and Chains per oil power field    Specimen Source: .Sputum Sputum    Culture Results:   Normal Respiratory Samaria present    Culture - Sputum . (12.05.18 @ 22:50)    Gram Stain:   Moderate polymorphonuclear leukocytes per low power field  Few Squamous epithelial cells per low power field  Moderate Gram Positive Cocci in Pairs and Chains per oil power field    Specimen Source: .Sputum Sputum    Culture Results:   Normal Respiratory Samaria present    Rapid Respiratory Viral Panel (11.30.18 @ 01:30)    Rapid RVP Result: NotDetec: The FilmArray RVP Rapid uses polymerase chain reaction (PCR) and melt  curve analysis to screen for adenovirus; coronavirus HKU1, NL63, 229E,  OC43; human metapneumovirus (hMPV); human enterovirus/rhinovirus  (Entero/RV); influenza A; influenza A/H1;influenza A/H3; influenza  A/H1-2009; influenza B; parainfluenza viruses 1, 2, 3, 4; respiratory  syncytial virus; Bordetella pertussis; Mycoplasma pneumoniae; and  Chlamydophila pneumoniae.    RADIOLOGY:  [x] Chest radiographs reviewed and interpreted by me    < from: Xray Chest 1 View- PORTABLE-Urgent (01.05.19 @ 17:56) >    EXAM:  XR CHEST PORTABLE URGENT 1V                          PROCEDURE DATE:  01/05/2019      INTERPRETATION:  CLINICAL INFORMATION: Shortness of breath.    EXAM: Frontal radiograph of the chest.    COMPARISON: Chest radiograph from 12/25/2018    FINDINGS:  Heart size is normal.    The lungs are clear. No pneumothorax or pleural effusions.    Visualized osseous structures are unremarkable.    IMPRESSION: Clear lungs.    ADAM VAUGHN M.D., RADIOLOGY RESIDENT  This document has been electronically signed.  JEYSON SANCHEZ M.D., ATTENDING RADIOLOGIST  This document has been electronically signed. Jan 7 2019  3:28PM      < end of copied text >  ---------------------------------------------------------------------------------------------------------------    < from: [facility] Duplex Lower Ext Vein Scan, Bilbeto (12.30.18 @ 19:06) >    EXAM:  DUPLEX SCAN EXT VEINS LOWER BI                          PROCEDURE DATE:  12/30/2018      INTERPRETATION:  Clinical indication: Worsening edema.    Technique:  Grayscale, color Doppler and spectral Doppler ultrasound was   utilized toevaluate bilateral lower extremity deep venous system.      Comparison: 12/6/2018.    Findings: There is no thrombosis in bilateral common femoral veins,   femoral veins or popliteal veins. Visualized calf veins are patent. There   is bilateral lowerextremity soft tissue edema.    Impression:     No evidence of deep vein thrombosis in either lower extremity.    BROCK TOBIN M.D., ATTENDING RADIOLOGIST  This document has been electronically signed. Dec 30 2018  7:24PM     < end of copied text >  ---------------------------------------------------------------------------------------------------------------  < from: [facility] Duplex Upper Ext Vein Scan, Bilat (12.30.18 @ 19:05) >    EXAM:  DUPLEX SCAN EXT VEINS UPPER BI                          PROCEDURE DATE:  12/30/2018      INTERPRETATION:  Clinical information: Worsening bilateral upper   extremity edema.    Findings: Duplex evaluation of the deep venous system of the right and   left upper extremity was performed to include the brachiocephalic,   internal jugular, subclavian, axillary, brachial, radial and ulnar veins.   No echogenic thrombus is seen within the vein lumina. Complete coaptation   of those segments of vein accessible to compression was achieved.   Spontaneous venous flow with respiratory and cardiac pulsatility is noted   throughout.     The right innominate vein is patent. The left innominate vein was not   visualized.     The right and left basilic and cephalic veins are patent and compressible.    Impression: No duplex evidence of DVT in the right and left upper   extremity.    RAYMUNDO DUMONT M.D., ATTENDING RADIOLOGIST  This document has been electronically signed. Dec 31 2018  8:49AM     < end of copied text >  ---------------------------------------------------------------------------------------------------------------  < from: CT Angio Chest w/ IV Cont (12.04.18 @ 16:30) >    EXAM:  CT ANGIO CHEST (W)AW IC                          PROCEDURE DATE:  12/04/2018      INTERPRETATION:  CT CHEST WITH CONTRAST    INDICATION: Known COPD not responding to steroids and bronchodilators.   Progressively worsening dyspnea. Evaluate for pulmonary embolism.    IMPRESSION:   1.  No pulmonary embolism.  2. Emphysematous changes of the lung.    DIANA MEDINA M.D., RADIOLOGY RESIDENT  This document has been electronically signed.  JEYSON SANCHEZ M.D., ATTENDING RADIOLOGIST  This document has been electronically signed. Dec  4 2018  5:21PM      < end of copied text >  ---------------------------------------------------------------------------------------------------------------  SPIROMETRY:     FEV1 0.56 liters - 22% predicted  FVC 1.73 liters - 52% predicted  FEV1% 32

## 2019-01-08 NOTE — PROGRESS NOTE ADULT - SUBJECTIVE AND OBJECTIVE BOX
HPI: remains in SR    MEDICATIONS  (STANDING):  amiodarone    Tablet 400 milliGRAM(s) Oral two times a day  apixaban 5 milliGRAM(s) Oral every 12 hours  artificial tears (preservative free) Ophthalmic Solution 1 Drop(s) Both EYES three times a day  aspirin enteric coated 81 milliGRAM(s) Oral daily  azithromycin   Tablet 250 milliGRAM(s) Oral <User Schedule>  Biotene Dry Mouth Oral Rinse 5 milliLiter(s) Swish and Spit two times a day  buDESOnide   0.5 milliGRAM(s) Respule 0.5 milliGRAM(s) Inhalation every 12 hours  cholecalciferol 2000 Unit(s) Oral daily  dextrose 5%. 1000 milliLiter(s) (50 mL/Hr) IV Continuous <Continuous>  dextrose 50% Injectable 12.5 Gram(s) IV Push once  dextrose 50% Injectable 25 Gram(s) IV Push once  dextrose 50% Injectable 25 Gram(s) IV Push once  diltiazem    Tablet 30 milliGRAM(s) Oral every 6 hours  docusate sodium 100 milliGRAM(s) Oral three times a day  furosemide    Tablet 40 milliGRAM(s) Oral daily  insulin glargine Injectable (LANTUS) 9 Unit(s) SubCutaneous at bedtime  insulin lispro (HumaLOG) corrective regimen sliding scale   SubCutaneous three times a day before meals  insulin lispro (HumaLOG) corrective regimen sliding scale   SubCutaneous at bedtime  insulin lispro Injectable (HumaLOG) 4 Unit(s) SubCutaneous before breakfast  insulin lispro Injectable (HumaLOG) 3 Unit(s) SubCutaneous with dinner  insulin lispro Injectable (HumaLOG) 4 Unit(s) SubCutaneous before lunch  ipratropium    for Nebulization 500 MICROGram(s) Nebulizer every 6 hours  isosorbide   mononitrate ER Tablet (IMDUR) 30 milliGRAM(s) Oral daily  levalbuterol Inhalation 0.63 milliGRAM(s) Inhalation every 6 hours  melatonin 1 milliGRAM(s) Oral at bedtime  montelukast 10 milliGRAM(s) Oral daily  multivitamin 1 Tablet(s) Oral daily  nystatin    Suspension [id number] Unit(s) Oral four times a day  pantoprazole    Tablet 40 milliGRAM(s) Oral before breakfast  polyethylene glycol 3350 17 Gram(s) Oral daily  predniSONE   Tablet   Oral   senna 2 Tablet(s) Oral at bedtime  simethicone 80 milliGRAM(s) Chew once  theophylline ER Capsule 400 milliGRAM(s) Oral daily    MEDICATIONS  (PRN):  aluminum hydroxide/magnesium hydroxide/simethicone Suspension 30 milliLiter(s) Oral every 6 hours PRN Dyspepsia  bisacodyl Suppository 10 milliGRAM(s) Rectal daily PRN Constipation  dextrose 40% Gel 15 Gram(s) Oral once PRN Blood Glucose LESS THAN 70 milliGRAM(s)/deciliter  glucagon  Injectable 1 milliGRAM(s) IntraMuscular once PRN Glucose LESS THAN 70 milligrams/deciliter  ondansetron Injectable 4 milliGRAM(s) IV Push every 6 hours PRN Nausea and/or Vomiting  petrolatum Ophthalmic Ointment 1 Application(s) Both EYES at bedtime PRN dry eyes  sodium chloride 0.65% Nasal 1 Spray(s) Both Nostrils two times a day PRN Nasal Congestion    No Known Allergies Intolerances  albuterol (Unknown)    REVIEW OF SYSTEM:    Constitutional: denies fever, chills, fatigue  Neuro: denies headache, numbness, weakness, dizziness  Resp: denies cough, wheezing, shortness of breath  CVS: denies chest pain, palpitations, leg swelling  GI: denies abdominal pain, nausea, vomiting, diarrhea   : denies dysuria, frequency, incontinence  Skin: denies itching, burning, rashes, or lesions   Msk: denies joint pain or swelling     Vital Signs Last 24 Hrs  T(C): 36.6 (08 Jan 2019 06:06), Max: 37.1 (07 Jan 2019 17:49)  T(F): 97.9 (08 Jan 2019 06:06), Max: 98.8 (07 Jan 2019 17:49)  HR: 73 (08 Jan 2019 06:06) (67 - 148)  BP: 153/72 (08 Jan 2019 06:06) (111/92 - 153/72)  RR: 18 (08 Jan 2019 06:06) (18 - 18)  SpO2: 100% (08 Jan 2019 06:06) (100% - 100%)    Physical Exam:  General : well developed, well nourished,  and no acute distress  Neuro : Alert and oriented x 3, no focal deficits  HEENT : Sclera clear, no JVD, no Lymphadeopathy, no carotid bruits, neck supple  Lungs: diminshed significantly with exp crackles and wheezing right base    Cardiovascular : + 1 +2, RRR, no murmurs, no rubs  GI : abdomen soft, NT, ND, + BS   : no suprapubic tenderness  Extremities : +1 pitting  edema legs feet , feet warm   Skin : warm dry     TELE: SR 60-70's   EKG:     LABS:                        10.0   10.1  )-----------( 418      ( 08 Jan 2019 06:33 )             29.3     01-08    138  |  89<L>  |  27<H>  ----------------------------<  167<H>  3.8   |  37<H>  |  1.18    Ca    9.4      08 Jan 2019 06:33  Mg     2.4     01-07    RADIOLOGY & ADDITIONAL TESTS:  < from: Transesophageal Echocardiogram w/o TTE (01.07.19 @ 18:04) >  PROCEDURE: Transesophageal (EVAN) was performed.  Informed  consent was first obtained for EVAN. The patient was sedated  - see anesthesia record.  The procedure was monitored with  automatic blood pressure monitoring, ECG tracings and pulse  oximetry. The transesophageal probe was placed in the  esophagus posterior to the heart without complications.  INDICATION: Abnormal electrocardiogram  (R94.31 )  ------------------------------------------------------------------------  Observations:  Mitral Valve: Normal mitral valve.  Aortic Valve/Aorta: Normal trileaflet aortic valve.  Left Atrium: No left atrial or left atrial appendage  thrombus.   Normal left atrial appendage function  (velocity= 1.4 m/s).  Left Ventricle: Unable to assess LV systolic function in  the setting of atrial fibrillation with rapid ventricular  response.  Right Heart: Normal right atrium. The right ventricle is  not well visualized; grossly normal right ventricular  systolic function. Normal tricuspid valve.  Pericardium/Pleura: Normal pericardium with no pericardial  effusion.  Hemodynamic: Estimated right atrial pressure is 8 mm Hg.  ------------------------------------------------------------------------  Conclusions:  1. No left atrial or left atrial appendage thrombus.  Normal left atrial appendage function (velocity= 1.4 m/s).  2. Unable to assess LV systolic function in the setting of  atrial fibrillation with rapid ventricular response.  3. The right ventricle is not well visualized; grossly  normal right ventricular systolic function.  ------------------------------------------------------------------------  Confirmed on  1/8/2019 - 08:44:20 by Jamil Pedro M.D.  ------------------------------------------------------------------------

## 2019-01-08 NOTE — CHART NOTE - NSCHARTNOTEFT_GEN_A_CORE
Tried to see pt this but per staff pt requesting not to be disturbed. BG at goal today.  Noted Prednisone dose coming down to 10mg daily tomorrow.   Will decrease Lantus dose to 8 units tonight and see pt tomorrow. Tried to see pt but per staff pt requesting not to be disturbed unless clinically necessary. BG at goal today.  Noted Prednisone dose coming down to 10mg daily tomorrow.   Will decrease Lantus dose to 8 units tonight and see pt tomorrow.

## 2019-01-08 NOTE — PROGRESS NOTE ADULT - PROBLEM SELECTOR PLAN 7
dvt ppx: on apixaban  dispo: transfer to [facility] acute rehab, medically stable for tomorrow if HR remains controlled

## 2019-01-09 DIAGNOSIS — R60.9 EDEMA, UNSPECIFIED: ICD-10-CM

## 2019-01-09 LAB
ALBUMIN SERPL ELPH-MCNC: 4.2 G/DL — SIGNIFICANT CHANGE UP (ref 3.3–5)
ALP SERPL-CCNC: 56 U/L — SIGNIFICANT CHANGE UP (ref 40–120)
ALT FLD-CCNC: 45 U/L — SIGNIFICANT CHANGE UP (ref 10–45)
ANION GAP SERPL CALC-SCNC: 15 MMOL/L — SIGNIFICANT CHANGE UP (ref 5–17)
AST SERPL-CCNC: 20 U/L — SIGNIFICANT CHANGE UP (ref 10–40)
BILIRUB SERPL-MCNC: 0.4 MG/DL — SIGNIFICANT CHANGE UP (ref 0.2–1.2)
BUN SERPL-MCNC: 22 MG/DL — SIGNIFICANT CHANGE UP (ref 7–23)
CALCIUM SERPL-MCNC: 9.9 MG/DL — SIGNIFICANT CHANGE UP (ref 8.4–10.5)
CHLORIDE SERPL-SCNC: 84 MMOL/L — LOW (ref 96–108)
CO2 SERPL-SCNC: 38 MMOL/L — HIGH (ref 22–31)
CREAT SERPL-MCNC: 1.1 MG/DL — SIGNIFICANT CHANGE UP (ref 0.5–1.3)
GLUCOSE BLDC GLUCOMTR-MCNC: 125 MG/DL — HIGH (ref 70–99)
GLUCOSE BLDC GLUCOMTR-MCNC: 139 MG/DL — HIGH (ref 70–99)
GLUCOSE BLDC GLUCOMTR-MCNC: 198 MG/DL — HIGH (ref 70–99)
GLUCOSE BLDC GLUCOMTR-MCNC: 226 MG/DL — HIGH (ref 70–99)
GLUCOSE SERPL-MCNC: 234 MG/DL — HIGH (ref 70–99)
HCT VFR BLD CALC: 33 % — LOW (ref 34.5–45)
HGB BLD-MCNC: 10.8 G/DL — LOW (ref 11.5–15.5)
LYMPHOCYTES # BLD AUTO: 1 K/UL — SIGNIFICANT CHANGE UP (ref 1–3.3)
LYMPHOCYTES # BLD AUTO: 9 % — LOW (ref 13–44)
MAGNESIUM SERPL-MCNC: 1.7 MG/DL — SIGNIFICANT CHANGE UP (ref 1.6–2.6)
MCHC RBC-ENTMCNC: 29.8 PG — SIGNIFICANT CHANGE UP (ref 27–34)
MCHC RBC-ENTMCNC: 32.7 GM/DL — SIGNIFICANT CHANGE UP (ref 32–36)
MCV RBC AUTO: 91 FL — SIGNIFICANT CHANGE UP (ref 80–100)
MONOCYTES # BLD AUTO: 0.5 K/UL — SIGNIFICANT CHANGE UP (ref 0–0.9)
MONOCYTES NFR BLD AUTO: 8 % — SIGNIFICANT CHANGE UP (ref 2–14)
NEUTROPHILS # BLD AUTO: 9.4 K/UL — HIGH (ref 1.8–7.4)
NEUTROPHILS NFR BLD AUTO: 75 % — SIGNIFICANT CHANGE UP (ref 43–77)
PLATELET # BLD AUTO: 444 K/UL — HIGH (ref 150–400)
POTASSIUM SERPL-MCNC: 3.7 MMOL/L — SIGNIFICANT CHANGE UP (ref 3.5–5.3)
POTASSIUM SERPL-SCNC: 3.7 MMOL/L — SIGNIFICANT CHANGE UP (ref 3.5–5.3)
PROT SERPL-MCNC: 6.9 G/DL — SIGNIFICANT CHANGE UP (ref 6–8.3)
RBC # BLD: 3.63 M/UL — LOW (ref 3.8–5.2)
RBC # FLD: 15.2 % — HIGH (ref 10.3–14.5)
SODIUM SERPL-SCNC: 137 MMOL/L — SIGNIFICANT CHANGE UP (ref 135–145)
WBC # BLD: 11.1 K/UL — HIGH (ref 3.8–10.5)
WBC # FLD AUTO: 11.1 K/UL — HIGH (ref 3.8–10.5)

## 2019-01-09 PROCEDURE — 99232 SBSQ HOSP IP/OBS MODERATE 35: CPT

## 2019-01-09 PROCEDURE — 99233 SBSQ HOSP IP/OBS HIGH 50: CPT

## 2019-01-09 RX ORDER — FUROSEMIDE 40 MG
20 TABLET ORAL ONCE
Qty: 0 | Refills: 0 | Status: COMPLETED | OUTPATIENT
Start: 2019-01-09 | End: 2019-01-09

## 2019-01-09 RX ORDER — HYDROCORTISONE 1 %
1 OINTMENT (GRAM) TOPICAL ONCE
Qty: 0 | Refills: 0 | Status: COMPLETED | OUTPATIENT
Start: 2019-01-09 | End: 2019-01-09

## 2019-01-09 RX ORDER — CALAMINE AND ZINC OXIDE AND PHENOL 160; 10 MG/ML; MG/ML
1 LOTION TOPICAL
Qty: 0 | Refills: 0 | Status: DISCONTINUED | OUTPATIENT
Start: 2019-01-09 | End: 2019-01-15

## 2019-01-09 RX ORDER — DILTIAZEM HCL 120 MG
240 CAPSULE, EXT RELEASE 24 HR ORAL DAILY
Qty: 0 | Refills: 0 | Status: DISCONTINUED | OUTPATIENT
Start: 2019-01-10 | End: 2019-01-15

## 2019-01-09 RX ADMIN — LEVALBUTEROL 0.63 MILLIGRAM(S): 1.25 SOLUTION, CONCENTRATE RESPIRATORY (INHALATION) at 00:00

## 2019-01-09 RX ADMIN — Medication 2000 UNIT(S): at 12:04

## 2019-01-09 RX ADMIN — CALAMINE AND ZINC OXIDE AND PHENOL 1 APPLICATION(S): 160; 10 LOTION TOPICAL at 17:27

## 2019-01-09 RX ADMIN — Medication 5 MILLILITER(S): at 18:58

## 2019-01-09 RX ADMIN — Medication 1 APPLICATION(S): at 01:47

## 2019-01-09 RX ADMIN — INSULIN GLARGINE 8 UNIT(S): 100 INJECTION, SOLUTION SUBCUTANEOUS at 22:28

## 2019-01-09 RX ADMIN — Medication 1 DROP(S): at 09:06

## 2019-01-09 RX ADMIN — APIXABAN 5 MILLIGRAM(S): 2.5 TABLET, FILM COATED ORAL at 06:22

## 2019-01-09 RX ADMIN — ISOSORBIDE MONONITRATE 30 MILLIGRAM(S): 60 TABLET, EXTENDED RELEASE ORAL at 12:04

## 2019-01-09 RX ADMIN — Medication 81 MILLIGRAM(S): at 12:03

## 2019-01-09 RX ADMIN — Medication 4 UNIT(S): at 09:05

## 2019-01-09 RX ADMIN — Medication 1 APPLICATION(S): at 01:46

## 2019-01-09 RX ADMIN — Medication 100 MILLIGRAM(S): at 14:07

## 2019-01-09 RX ADMIN — Medication 20 MILLIGRAM(S): at 11:52

## 2019-01-09 RX ADMIN — Medication 4 UNIT(S): at 13:46

## 2019-01-09 RX ADMIN — Medication 500 MICROGRAM(S): at 00:07

## 2019-01-09 RX ADMIN — Medication 500 MICROGRAM(S): at 12:01

## 2019-01-09 RX ADMIN — Medication 0.5 MILLIGRAM(S): at 06:23

## 2019-01-09 RX ADMIN — Medication 500 MICROGRAM(S): at 06:26

## 2019-01-09 RX ADMIN — APIXABAN 5 MILLIGRAM(S): 2.5 TABLET, FILM COATED ORAL at 14:09

## 2019-01-09 RX ADMIN — SIMVASTATIN 10 MILLIGRAM(S): 20 TABLET, FILM COATED ORAL at 22:27

## 2019-01-09 RX ADMIN — Medication 400 MILLIGRAM(S): at 09:05

## 2019-01-09 RX ADMIN — LEVALBUTEROL 0.63 MILLIGRAM(S): 1.25 SOLUTION, CONCENTRATE RESPIRATORY (INHALATION) at 17:25

## 2019-01-09 RX ADMIN — LEVALBUTEROL 0.63 MILLIGRAM(S): 1.25 SOLUTION, CONCENTRATE RESPIRATORY (INHALATION) at 11:51

## 2019-01-09 RX ADMIN — Medication 1 DROP(S): at 22:27

## 2019-01-09 RX ADMIN — Medication 100 MILLIGRAM(S): at 22:27

## 2019-01-09 RX ADMIN — Medication 0.5 MILLIGRAM(S): at 17:25

## 2019-01-09 RX ADMIN — Medication 1 DROP(S): at 17:27

## 2019-01-09 RX ADMIN — Medication 3 UNIT(S): at 17:27

## 2019-01-09 RX ADMIN — LEVALBUTEROL 0.63 MILLIGRAM(S): 1.25 SOLUTION, CONCENTRATE RESPIRATORY (INHALATION) at 06:23

## 2019-01-09 RX ADMIN — Medication 500 MICROGRAM(S): at 17:25

## 2019-01-09 RX ADMIN — Medication 2: at 13:46

## 2019-01-09 RX ADMIN — Medication 10 MILLIGRAM(S): at 06:22

## 2019-01-09 RX ADMIN — MONTELUKAST 10 MILLIGRAM(S): 4 TABLET, CHEWABLE ORAL at 12:03

## 2019-01-09 RX ADMIN — Medication 1 TABLET(S): at 12:03

## 2019-01-09 RX ADMIN — Medication 1 MILLIGRAM(S): at 22:27

## 2019-01-09 RX ADMIN — SENNA PLUS 2 TABLET(S): 8.6 TABLET ORAL at 22:27

## 2019-01-09 RX ADMIN — Medication 40 MILLIGRAM(S): at 06:26

## 2019-01-09 RX ADMIN — PANTOPRAZOLE SODIUM 40 MILLIGRAM(S): 20 TABLET, DELAYED RELEASE ORAL at 06:23

## 2019-01-09 RX ADMIN — Medication 1 APPLICATION(S): at 00:08

## 2019-01-09 RX ADMIN — Medication 100 MILLIGRAM(S): at 06:22

## 2019-01-09 NOTE — PROGRESS NOTE ADULT - PROBLEM SELECTOR PLAN 3
b/l LE chronic for past 3+weeks, likely related to IVF during this admission and dCHF  - keep b/l legs elevated  - wound care consult for superficial blister and dressing/ointment recs  - c/w po lasix, and one dose low lasix 20 mg iv once today - Cr stable

## 2019-01-09 NOTE — PROGRESS NOTE ADULT - ASSESSMENT
59 yo F PMH COPD, chronic hypoxic resp failure on home O2 3L, HTN, HLD, CAD s/p 6 stents, dCHF, T2DM, PVD, p/w progressive worsening dyspnea x 2 weeks a/w COPD exacerbation, acute on chronic hypoxic/hypercarbic respiratory failure, w/ dependency on bipap. With hosp course c/b new onset afib RVR s/p successful CV, titrating meds, and b/l feet edema.

## 2019-01-09 NOTE — PROGRESS NOTE ADULT - SUBJECTIVE AND OBJECTIVE BOX
[facility] - [facility]     PROGRESS NOTE    CHIEF COMPLAINT: chronic hypoxic/hypercapnic respiratory failure; COPD exacerbation; emphysema; dyspnea; obesity; atrial fibrillation with RVR (resolved)    INTERVAL HISTORY: significant foot swelling with water blisters, redness and discomfort; s/p successful DCCV - remains in NSR - off amiodarone and beta-blockers; slept in bed with BIPAP for several hours; off BIPAP all day yesterday without shortness of breath on a nasal canula @ 4lpm - mildly short of breath at present; ABG with improved hypercapnia without acidemia; arm swelling nearly resolved on diuretics and reduced dose of steroids; resolved AURORA, hyperkalemia and hyponatremia; no cough, sputum production, chest congestion or wheeze; no fevers, chills or sweats; no chest pain/pressure or palpitations; decreased lower extremity swelling with leg elevation except for the feet    REVIEW OF SYSTEMS:  Constitutional: As per interval history  HEENT: Within normal limits  CV: As per interval history  Resp: As per interval history  GI: Within normal limits   : Within normal limits  Musculoskeletal: improving lower extremity weakness   Skin: Within normal limits  Neurological: Within normal limits  Psychiatric: frustrated   Endocrine: Within normal limits  Hematologic/Lymphatic: Within normal limits  Allergic/Immunologic: Within normal limits      MEDICATIONS:     Pulmonary "  buDESOnide   0.5 milliGRAM(s) Respule 0.5 milliGRAM(s) Inhalation every 12 hours  ipratropium    for Nebulization 500 MICROGram(s) Nebulizer every 6 hours  levalbuterol Inhalation 0.63 milliGRAM(s) Inhalation every 6 hours  montelukast 10 milliGRAM(s) Oral daily  theophylline ER Capsule 400 milliGRAM(s) Oral daily      Anti-microbials:  azithromycin   Tablet 250 milliGRAM(s) Oral <User Schedule>      Cardiovascular:  diltiazem    Tablet 60 milliGRAM(s) Oral every 6 hours  furosemide    Tablet 40 milliGRAM(s) Oral daily  furosemide   Injectable 20 milliGRAM(s) IV Push once  isosorbide   mononitrate ER Tablet (IMDUR) 30 milliGRAM(s) Oral daily      Other:  aluminum hydroxide/magnesium hydroxide/simethicone Suspension 30 milliLiter(s) Oral every 6 hours PRN  apixaban 5 milliGRAM(s) Oral every 12 hours  artificial tears (preservative free) Ophthalmic Solution 1 Drop(s) Both EYES three times a day  aspirin enteric coated 81 milliGRAM(s) Oral daily  Biotene Dry Mouth Oral Rinse 5 milliLiter(s) Swish and Spit two times a day  calamine Lotion 1 Application(s) Topical two times a day  cholecalciferol 2000 Unit(s) Oral daily  dextrose 40% Gel 15 Gram(s) Oral once PRN  dextrose 5%. 1000 milliLiter(s) IV Continuous <Continuous>  dextrose 50% Injectable 12.5 Gram(s) IV Push once  dextrose 50% Injectable 25 Gram(s) IV Push once  dextrose 50% Injectable 25 Gram(s) IV Push once  docusate sodium 100 milliGRAM(s) Oral three times a day  glucagon  Injectable 1 milliGRAM(s) IntraMuscular once PRN  insulin glargine Injectable (LANTUS) 8 Unit(s) SubCutaneous at bedtime  insulin lispro (HumaLOG) corrective regimen sliding scale   SubCutaneous three times a day before meals  insulin lispro (HumaLOG) corrective regimen sliding scale   SubCutaneous at bedtime  insulin lispro Injectable (HumaLOG) 4 Unit(s) SubCutaneous before breakfast  insulin lispro Injectable (HumaLOG) 3 Unit(s) SubCutaneous with dinner  insulin lispro Injectable (HumaLOG) 4 Unit(s) SubCutaneous before lunch  melatonin 1 milliGRAM(s) Oral at bedtime  multivitamin 1 Tablet(s) Oral daily  ondansetron Injectable 4 milliGRAM(s) IV Push every 6 hours PRN  pantoprazole    Tablet 40 milliGRAM(s) Oral before breakfast  petrolatum Ophthalmic Ointment 1 Application(s) Both EYES at bedtime PRN  polyethylene glycol 3350 17 Gram(s) Oral daily  predniSONE   Tablet   Oral   predniSONE   Tablet 10 milliGRAM(s) Oral daily  senna 2 Tablet(s) Oral at bedtime  simethicone 80 milliGRAM(s) Chew once  simvastatin 10 milliGRAM(s) Oral at bedtime  sodium chloride 0.65% Nasal 1 Spray(s) Both Nostrils two times a day PRN        OBJECTIVE:    I&O's Detail    08 Jan 2019 07:01  -  09 Jan 2019 07:00  --------------------------------------------------------  IN:    Oral Fluid: 600 mL  Total IN: 600 mL    OUT:  Total OUT: 0 mL    Total NET: 600 mL      09 Jan 2019 07:01  -  09 Jan 2019 11:04  --------------------------------------------------------  IN:    Oral Fluid: 120 mL  Total IN: 120 mL    OUT:  Total OUT: 0 mL    Total NET: 120 mL      POCT Blood Glucose.: 139 mg/dL (09 Jan 2019 08:31)  POCT Blood Glucose.: 125 mg/dL (08 Jan 2019 21:43)  POCT Blood Glucose.: 233 mg/dL (08 Jan 2019 17:40)  POCT Blood Glucose.: 139 mg/dL (08 Jan 2019 12:38)      PHYSICAL EXAM:       ICU Vital Signs Last 24 Hrs  T(C): 36.6 (09 Jan 2019 06:20), Max: 37 (08 Jan 2019 22:22)  T(F): 97.9 (09 Jan 2019 06:20), Max: 98.6 (08 Jan 2019 22:22)  HR: 93 (09 Jan 2019 10:53) (68 - 93)  BP: 139/81 (09 Jan 2019 06:20) (134/78 - 150/84)  BP(mean): --  ABP: --  ABP(mean): --  RR: 18 (09 Jan 2019 06:20) (18 - 18)  SpO2: 100% (09 Jan 2019 10:53) (100% - 100%) on 4lpm nasal canula    General: Awake. Alert. Cooperative. No distress. Appears stated age. Obese. Cushingoid.  HEENT:  Atraumatic. Moonlike facies. Anicteric. Normal oral mucosa. PERRL. EOMI.   Neck: Supple. Trachea midline. Thyroid without enlargement/tenderness/nodules. No carotid bruit. No JVD.	  Cardiovascular: Regular rate and rhythm. Distant S1 S2. No murmurs, rubs or gallops.  Respiratory: Respirations unlabored. Markedly decreased breath sounds throughout. No wheeze. Faint right basilar rales. No curvature.  Abdomen: Soft. Non-tender. Non-distended. No organomegaly. No masses. Normal bowel sounds. Obese.  Extremities: Warm to touch. No clubbing or cyanosis. Mild bilateral lower extremity edema up to the thigh. Upper extremity swelling resolved. Bilateral foot swelling with bullae, erythema and tenderness to palpitation  Pulses: Decreased lower extremity peripheral pulses.  Skin: No rashes or lesions. No ecchymoses. No cyanosis. Warm to touch.   Lymph Nodes: Cervical, supraclavicular and axillary nodes normal  Neurological: Motor and sensory examination equal and normal. A and O x 3  Psychiatry: Frustrated       LABS:                        10.0   10.1  )-----------( 418      ( 08 Jan 2019 06:33 )             29.3     01-08    138  |  89<L>  |  27<H>  ----------------------------<  167<H>  3.8   |  37<H>  |  1.18    01-07    139  |  88<L>  |  23  ----------------------------<  169<H>  3.9   |  39<H>  |  1.16    Ca      9.4      01-08    Ca      9.6      01-07    Phos    3.8     01-05      Mg       2.0     01-08    Mg       2.4     01-07    TPro  6.4  /  Alb  3.8  /  TBili  0.3  /  DBili  x   /  AST  17  /  ALT  44  /  AlkPhos  52  01-05    ABG - ( 06 Jan 2019 06:44 )  pH: 7.39  /  pCO2: 68    /  pO2: 99    / HCO3: 40    / Base Excess: 13.3  /  SaO2: 98        ABG - ( 03 Jan 2019 06:40 )  pH: 7.44  /  pCO2: 65    /  pO2: 152   / HCO3: 43    / Base Excess: 15.9  /  SaO2: 97        ABG - ( 01 Jan 2019 03:44 )  pH: 7.42  /  pCO2: 69    /  pO2: 165   / HCO3: 44    / Base Excess: 17.3  /  SaO2: 97        ABG - ( 31 Dec 2018 16:19 )  pH: 7.38  /  pCO2: 84    /  pO2: 26    / HCO3: 48    / Base Excess: 20.2  /  SaO2: 41        ABG - ( 18 Dec 2018 13:23 )  pH: 7.46  /  pCO2: 47    /  pO2: 88    / HCO3: 33    / Base Excess: 8.0   /  SaO2: 97        ABG - ( 16 Dec 2018 20:53 )  pH: 7.44  /  pCO2: 53    /  pO2: 173   / HCO3: 36    / Base Excess: 10.0  /  SaO2: 100       ABG - ( 13 Dec 2018 06:29 )  pH: 7.30  /  pCO2: 71    /  pO2: 182   / HCO3: 34    / Base Excess: 5.8   /  SaO2: 99        ABG - ( 13 Dec 2018 00:59 )  pH: 7.32  /  pCO2: 71    /  pO2: 75    / HCO3: 35    / Base Excess: 7.1   /  SaO2: 95        ABG - ( 11 Dec 2018 11:45 )  pH: 7.39  /  pCO2: 54    /  pO2: 98    / HCO3: 32    / Base Excess: 6.2   /  SaO2: 98        ABG - ( 09 Dec 2018 11:26 )  pH: 7.35  /  pCO2: 63    /  pO2: 145   / HCO3: 34    / Base Excess: 6.6   /  SaO2: 99      ABG - ( 09 Dec 2018 00:28 )  pH: 7.32  /  pCO2: 71    /  pO2: 124   / HCO3: 36    / Base Excess: 7.6   /  SaO2: 99        ABG - ( 08 Dec 2018 20:50 )  pH: 7.32  /  pCO2: 72    /  pO2: 80    / HCO3: 36    / Base Excess: 7.7   /  SaO2: 95        Serum Pro-Brain Natriuretic Peptide: 254 pg/mL (12-13 @ 14:00)    < from: Transthoracic Echocardiogram (12.07.18 @ 08:59) >    Patient name: GUY HARTMAN  YOB: 1960   Age: 58 (F)   MR#: [id number]  Study Date: 12/7/2018  [id number]  Study quality: Technically good  Referring Physician: Allen ingram MD  BloodPressure: 120/80 mmHg  Height: 163 cm  Weight: 88 kg  BSA: 1.9 m2  ------------------------------------------------------------------------  PROCEDURE: Transthoracic echocardiogram with 2-D, M-Mode  and complete spectral and color flow Doppler.  INDICATION: Other forms of dyspnea (R06.09)  ------------------------------------------------------------------------  Dimensions:    Normal Values:  LA:     3.6    2.0 - 4.0 cm  Ao:     2.9    2.0 - 3.8 cm  SEPTUM: 0.9    0.6 - 1.2 cm  PWT:    0.8    0.6- 1.1 cm  LVIDd:  4.9    3.0 - 5.6 cm  LVIDs:  2.9    1.8 - 4.0 cm  Derived variables:  LVMI: 73 g/m2  RWT: 0.32  Fractional short: 40 %  EF (Monicatz): 71 %  Doppler Peak Velocity (m/sec): AoV=1.2  ------------------------------------------------------------------------  Observations:  Mitral Valve: Normal mitral valve.  Aortic Valve/Aorta: Normal trileaflet aortic valve. Peak  transaortic valve gradient equals 6 mm Hg, mean transaortic  valve gradient equals 3 mm Hg, aortic valve velocity time  integral equals 22 cm. Trace aortic regurgitation.  Aortic Root: 2.9 cm.  Left Atrium: Normal left atrium.  LA volume index = 18  cc/m2.  Left Ventricle: Normal left ventricular systolic function.  No segmental wall motion abnormalities. Normal left  ventricular internal dimensions and wall thicknesses. Mild  diastolic dysfunction (Stage I).  Right Heart: Normal right atrium. Normal right ventricular  size and function. Normal tricuspid valve. Minimal  tricuspid regurgitation. Normal pulmonic valve.  Pericardium/Pleura: Normal pericardium with no pericardial  effusion.  Hemodynamic: Estimated right atrial pressure is 8 mm Hg.  Inadequate tricuspid regurgitation Doppler envelope  precludes estimation of RVSP.  ------------------------------------------------------------------------  Conclusions:  1. Normal mitral valve.  2. Normal trileaflet aortic valve. Peak transaortic valve  gradient equals 6 mm Hg, mean transaortic valve gradient  equals 3 mm Hg, aortic valve velocity time integral equals  22 cm. Trace aortic regurgitation.  3. Aortic Root: 2.9 cm.  4. Normal left atrium.  LA volume index = 18 cc/m2.  5. Normal left ventricular internal dimensions and wall  thicknesses.  6. Normal left ventricular systolic function. No segmental  wall motion abnormalities.  7. Mild diastolic dysfunction (Stage I).  8. Normal right ventricular size and function.  9. Inadequate tricuspid regurgitation Doppler envelope  precludes estimation of RVSP.  10. Normal tricuspid valve. Minimal tricuspid  regurgitation.  *** Compared with echocardiogram report of 2/18/2014, no  significant changes noted.  ------------------------------------------------------------------------  Confirmed on  12/7/2018 - 13:49:43 by Shefali Gómez M.D.  ------------------------------------------------------------------------    < end of copied text >  ------------------------------------------------------------------------------------------------  MICROBIOLOGY:     Culture - Sputum . (12.07.18 @ 08:34)    Gram Stain:   Rare polymorphonuclear leukocytes per low power field  Rare Squamous epithelial cells per low power field  Numerous Gram Positive Cocci in Pairs and Chains per oil power field    Specimen Source: .Sputum Sputum    Culture Results:   Normal Respiratory [place] present    Culture - Sputum . (12.05.18 @ 22:50)    Gram Stain:   Moderate polymorphonuclear leukocytes per low power field  Few Squamous epithelial cells per low power field  Moderate Gram Positive Cocci in Pairs and Chains per oil power field    Specimen Source: .Sputum Sputum    Culture Results:   Normal Respiratory [place] present    Rapid Respiratory Viral Panel (11.30.18 @ 01:30)    Rapid RVP Result: NotDete: The [employer] RVP Rapid uses polymerase chain reaction (PCR) and melt  curve analysis to screen for adenovirus; coronavirus HKU1, NL63, 229E,  OC43; human metapneumovirus (hMPV); human enterovirus/rhinovirus  (Entero/RV); influenza A; influenza A/H1;influenza A/H3; influenza  A/H1-2009; influenza B; parainfluenza viruses 1, 2, 3, 4; respiratory  syncytial virus; Bordetella pertussis; Mycoplasma pneumoniae; and  Chlamydophila pneumoniae.    RADIOLOGY:  [x] Chest radiographs reviewed and interpreted by me    < from: Xray Chest 1 View- PORTABLE-Urgent (01.05.19 @ 17:56) >    EXAM:  XR CHEST PORTABLE URGENT 1V                          PROCEDURE DATE:  01/05/2019      INTERPRETATION:  CLINICAL INFORMATION: Shortness of breath.    EXAM: Frontal radiograph of the chest.    COMPARISON: Chest radiograph from 12/25/2018    FINDINGS:  Heart size is normal.    The lungs are clear. No pneumothorax or pleural effusions.    Visualized osseous structures are unremarkable.    IMPRESSION: Clear lungs.    ADAM VAUGHN M.D., RADIOLOGY RESIDENT  This document has been electronically signed.  JEYSON SANCHEZ M.D., ATTENDING RADIOLOGIST  This document has been electronically signed. Jan 7 2019  3:28PM      < end of copied text >  ---------------------------------------------------------------------------------------------------------------    < from: [facility] Duplex Lower Ext Vein Scan, Bilat (12.30.18 @ 19:06) >    EXAM:  DUPLEX SCAN EXT VEINS LOWER BI                          PROCEDURE DATE:  12/30/2018      INTERPRETATION:  Clinical indication: Worsening edema.    Technique:  Grayscale, color Doppler and spectral Doppler ultrasound was   utilized toevaluate bilateral lower extremity deep venous system.      Comparison: 12/6/2018.    Findings: There is no thrombosis in bilateral common femoral veins,   femoral veins or popliteal veins. Visualized calf veins are patent. There   is bilateral lowerextremity soft tissue edema.    Impression:     No evidence of deep vein thrombosis in either lower extremity.    BROCK TOBIN M.D., ATTENDING RADIOLOGIST  This document has been electronically signed. Dec 30 2018  7:24PM     < end of copied text >  ---------------------------------------------------------------------------------------------------------------  < from: VA Duplex Upper Ext Vein Scan, Bilat (12.30.18 @ 19:05) >    EXAM:  DUPLEX SCAN EXT VEINS UPPER BI                          PROCEDURE DATE:  12/30/2018      INTERPRETATION:  Clinical information: Worsening bilateral upper   extremity edema.    Findings: Duplex evaluation of the deep venous system of the right and   left upper extremity was performed to include the brachiocephalic,   internal jugular, subclavian, axillary, brachial, radial and ulnar veins.   No echogenic thrombus is seen within the vein lumina. Complete coaptation   of those segments of vein accessible to compression was achieved.   Spontaneous venous flow with respiratory and cardiac pulsatility is noted   throughout.     The right innominate vein is patent. The left innominate vein was not   visualized.     The right and left basilic and cephalic veins are patent and compressible.    Impression: No duplex evidence of DVT in the right and left upper   extremity.    RAYMUNDO DUMONT M.D., ATTENDING RADIOLOGIST  This document has been electronically signed. Dec 31 2018  8:49AM     < end of copied text >  ---------------------------------------------------------------------------------------------------------------  < from: CT Angio Chest w/ IV Cont (12.04.18 @ 16:30) >    EXAM:  CT ANGIO CHEST (W)AW IC                          PROCEDURE DATE:  12/04/2018      INTERPRETATION:  CT CHEST WITH CONTRAST    INDICATION: Known COPD not responding to steroids and bronchodilators.   Progressively worsening dyspnea. Evaluate for pulmonary embolism.    IMPRESSION:   1.  No pulmonary embolism.  2. Emphysematous changes of the lung.    DIANA MEDINA M.D., RADIOLOGY RESIDENT  This document has been electronically signed.  JEYSON SANCHEZ M.D., ATTENDING RADIOLOGIST  This document has been electronically signed. Dec  4 2018  5:21PM      < end of copied text >  ---------------------------------------------------------------------------------------------------------------  SPIROMETRY:     FEV1 0.56 liters - 22% predicted  FVC 1.73 liters - 52% predicted  FEV1% 32

## 2019-01-09 NOTE — PROGRESS NOTE ADULT - PROBLEM SELECTOR PLAN 1
afib RVR s/p amiodarone, switched to CCB  s/p successful CV   - no longer on BB, switched to diltiazem 60 q6h per EP w/ adequate rate control. Tomorrow 1/10 will switch to dilt 240 CR po qd  - monitor on tele  - no longer on amiodarone given lung disease  - c/w apixaban tolerating well

## 2019-01-09 NOTE — PROGRESS NOTE ADULT - SUBJECTIVE AND OBJECTIVE BOX
Diabetes Follow up note: Saw pt this am  Interval Hx: 59 y/o F w/h/o controlled T2DM on Metformin 500mg bid. Also h/o CAD and COPD. Here with COPD exacerbation> Prednisone dose down to 10mg today x5 days and then 5 mg another 5 days. Pt now s/p EVAN/DCCV for Afib now on NSR. Pt remains with variable PO intake so usually has one hyperglycemic BG value every day at a different time depending on when pt eats more. Otherwise all BG levels at goal while on present insulin regimen.    Review of Systems:  General: Upset about  b/l foot edema with L foot blister and redness  GI: Intermittent nausea. Denies v/abd pain.   CV: No CP/SOB  ENDO: No S&Sx of hypoglycemia      MEDS:  azithromycin   Tablet 250 milliGRAM(s) Oral <User Schedule>  cholecalciferol 2000 Unit(s) Oral daily  insulin glargine Injectable (LANTUS) 8 Unit(s) SubCutaneous at bedtime  insulin lispro (HumaLOG) corrective regimen sliding scale   SubCutaneous three times a day before meals  insulin lispro (HumaLOG) corrective regimen sliding scale   SubCutaneous at bedtime  insulin lispro Injectable (HumaLOG) 4 Unit(s) SubCutaneous before breakfast  insulin lispro Injectable (HumaLOG) 3 Unit(s) SubCutaneous with dinner  insulin lispro Injectable (HumaLOG) 4 Unit(s) SubCutaneous before lunch  predniSONE   Tablet 10 milliGRAM(s) Oral daily  simvastatin 10 milliGRAM(s) Oral at bedtime  theophylline ER Capsule 400 milliGRAM(s) Oral daily      Allergies  No Known Allergies    PE:  General: Female sitting in edge of bed in NC at time of visit.   Vital Signs Last 24 Hrs  T(C): 36.5 (01-09-19 @ 12:28), Max: 37 (01-08-19 @ 22:22)  T(F): 97.7 (01-09-19 @ 12:28), Max: 98.6 (01-08-19 @ 22:22)  HR: 71 (01-09-19 @ 17:45) (71 - 93)  BP: 151/73 (01-09-19 @ 17:45) (134/78 - 151/73)  BP(mean): --  RR: 18 (01-09-19 @ 17:45) (16 - 20)  SpO2: 98% (01-09-19 @ 15:02) (92% - 100%)  Abd: Soft, NT, ND, obese   Extremities: Warm. LUE edema improved. B/L pedal edema worsen with L foot blisters and redness.   Neuro: A&O X3    LABS:  POCT Blood Glucose.: 125 mg/dL (01-09-19 @ 17:05)  POCT Blood Glucose.: 226 mg/dL (01-09-19 @ 12:58)  POCT Blood Glucose.: 139 mg/dL (01-09-19 @ 08:31)  POCT Blood Glucose.: 125 mg/dL (01-08-19 @ 21:43)  POCT Blood Glucose.: 233 mg/dL (01-08-19 @ 17:40)  POCT Blood Glucose.: 139 mg/dL (01-08-19 @ 12:38)  POCT Blood Glucose.: 162 mg/dL (01-08-19 @ 09:22)  POCT Blood Glucose.: 240 mg/dL (01-07-19 @ 22:49)  POCT Blood Glucose.: 81 mg/dL (01-07-19 @ 18:30)                      10.8   11.1  )-----------( 444      ( 09 Jan 2019 16:11 )             33.0     01-09    137  |  84<L>  |  22  ----------------------------<  234<H>  3.7   |  38<H>  |  1.10    Ca    9.9      09 Jan 2019 16:11  Mg     1.7     01-09    TPro  6.9  /  Alb  4.2  /  TBili  0.4  /  DBili  x   /  AST  20  /  ALT  45  /  AlkPhos  56  01-09    Hemoglobin A1C, Whole Blood: 7.2 % <H> [4.0 - 5.6] (11-30-18 @ 07:58)

## 2019-01-09 NOTE — PROGRESS NOTE ADULT - PROBLEM SELECTOR PLAN 8
dvt ppx: on apixaban  dispo: transfer to [facility] acute rehab. per CM, possibly bed by friday

## 2019-01-09 NOTE — PROGRESS NOTE ADULT - PROBLEM SELECTOR PLAN 2
Completed 7 days of biaxin.   - d/w pulm Dr Fair  - Home Trilogy vent arranged by pulmonary  - c/w bipap at night and PRN  - Plan for discharge to acute pulmonary rehab  - c/w Prednisone taper per pulm, Pulmicort & Duoneb nebs  - c/w Theophylline, Singulair

## 2019-01-09 NOTE — OCCUPATIONAL THERAPY INITIAL EVALUATION ADULT - PERTINENT HX OF CURRENT PROBLEM, REHAB EVAL
59 yo F p/w progressive worsening dyspnea x 2 weeks. Pt says she normally uses 3L NC atc at home. Infrequently leaves her house as of late. 2 wks ago, did leave her house a few times, once to go to pulm rehab, and felt much weaker than usual.  Has had increasing dyspnea and fatigue, worse with minimal exertion. Huffing/puffing for breath, sob with talking, showering, etc. +inc sputum volume production, See below

## 2019-01-09 NOTE — PROGRESS NOTE ADULT - SUBJECTIVE AND OBJECTIVE BOX
Patient is a 58y old  Female who presents with a chief complaint of dyspnea (09 Jan 2019 11:28)        SUBJECTIVE / OVERNIGHT EVENTS:  overnight w/ b/l feet pain, worsening of blister on dorsum of foot w/ some erythema or left foot.  seen at bedside today, agitated and upset, stating "you guys haven't done anything for me the last 3 weeks". "you guys are making me suffer" Frustrated that her foot is painful now but states that her blisters on her foot have been there for 3 weeks and now with worsening edema of foot.  denies n/v/f/chills, cp, sob, abd pain.    CAPILLARY BLOOD GLUCOSE  POCT Blood Glucose.: 139 mg/dL (09 Jan 2019 08:31)  POCT Blood Glucose.: 125 mg/dL (08 Jan 2019 21:43)  POCT Blood Glucose.: 233 mg/dL (08 Jan 2019 17:40)    I&O's Summary    08 Jan 2019 07:01  -  09 Jan 2019 07:00  --------------------------------------------------------  IN: 600 mL / OUT: 0 mL / NET: 600 mL    09 Jan 2019 07:01  -  09 Jan 2019 12:42  --------------------------------------------------------  IN: 120 mL / OUT: 0 mL / NET: 120 mL    Vital Signs Last 24 Hr  T(C): 36.5 (09 Jan 2019 12:28), Max: 37 (08 Jan 2019 22:22)  T(F): 97.7 (09 Jan 2019 12:28), Max: 98.6 (08 Jan 2019 22:22)  HR: 78 (09 Jan 2019 12:28) (68 - 93)  BP: 142/77 (09 Jan 2019 12:28) (134/78 - 150/84)  BP(mean): --  RR: 20 (09 Jan 2019 12:28) (18 - 20)  SpO2: 92% (09 Jan 2019 12:28) (92% - 100%)    PHYSICAL EXAM:  GENERAL:  Well appearing, obese F, in NAD, w/ nasal cannula breathing comfortably  HEAD:  NCAT  EYES: PERRLA, conjunctiva clear  NECK: Supple  CHEST/LUNG: CTA B/L w/ decr breath sounds  HEART: Reg rate. Normal S1, S2. No m/r/g.   ABDOMEN: SNTND. Bowel sounds present  EXTREMITIES:  2+ Peripheral Pulses, No clubbing, cyanosis  2+ pitting LE edema b/l up to knees  b/l feet w/ 3+ pitting edema w/ superficial large blister of dorsum w/ mild surrounding erythema of L foot  moving toes, normal cap refill b/l, normal sensation  PSYCH: defensive mood, easily irritable and agitated    LABS:                        10.0   10.1  )-----------( 418      ( 08 Jan 2019 06:33 )             29.3     01-08    138  |  89<L>  |  27<H>  ----------------------------<  167<H>  3.8   |  37<H>  |  1.18    Ca    9.4      08 Jan 2019 06:33  Mg     2.0     01-08      RADIOLOGY & ADDITIONAL TESTS:  Consultant(s) Notes Reviewed:  pulm, cards  Care Discussed with Consultants/Other Providers: Floor NP, pulrabia Fair    MEDICATIONS  (STANDING):  apixaban 5 milliGRAM(s) Oral every 12 hours  artificial tears (preservative free) Ophthalmic Solution 1 Drop(s) Both EYES three times a day  aspirin enteric coated 81 milliGRAM(s) Oral daily  azithromycin   Tablet 250 milliGRAM(s) Oral <User Schedule>  Biotene Dry Mouth Oral Rinse 5 milliLiter(s) Swish and Spit two times a day  buDESOnide   0.5 milliGRAM(s) Respule 0.5 milliGRAM(s) Inhalation every 12 hours  calamine Lotion 1 Application(s) Topical two times a day  cholecalciferol 2000 Unit(s) Oral daily  dextrose 5%. 1000 milliLiter(s) (50 mL/Hr) IV Continuous <Continuous>  dextrose 50% Injectable 12.5 Gram(s) IV Push once  dextrose 50% Injectable 25 Gram(s) IV Push once  dextrose 50% Injectable 25 Gram(s) IV Push once  diltiazem    Tablet 60 milliGRAM(s) Oral every 6 hours  docusate sodium 100 milliGRAM(s) Oral three times a day  furosemide    Tablet 40 milliGRAM(s) Oral daily  insulin glargine Injectable (LANTUS) 8 Unit(s) SubCutaneous at bedtime  insulin lispro (HumaLOG) corrective regimen sliding scale   SubCutaneous three times a day before meals  insulin lispro (HumaLOG) corrective regimen sliding scale   SubCutaneous at bedtime  insulin lispro Injectable (HumaLOG) 4 Unit(s) SubCutaneous before breakfast  insulin lispro Injectable (HumaLOG) 3 Unit(s) SubCutaneous with dinner  insulin lispro Injectable (HumaLOG) 4 Unit(s) SubCutaneous before lunch  ipratropium    for Nebulization 500 MICROGram(s) Nebulizer every 6 hours  isosorbide   mononitrate ER Tablet (IMDUR) 30 milliGRAM(s) Oral daily  levalbuterol Inhalation 0.63 milliGRAM(s) Inhalation every 6 hours  melatonin 1 milliGRAM(s) Oral at bedtime  montelukast 10 milliGRAM(s) Oral daily  multivitamin 1 Tablet(s) Oral daily  pantoprazole    Tablet 40 milliGRAM(s) Oral before breakfast  polyethylene glycol 3350 17 Gram(s) Oral daily  predniSONE   Tablet   Oral   predniSONE   Tablet 10 milliGRAM(s) Oral daily  senna 2 Tablet(s) Oral at bedtime  simethicone 80 milliGRAM(s) Chew once  simvastatin 10 milliGRAM(s) Oral at bedtime  theophylline ER Capsule 400 milliGRAM(s) Oral daily    MEDICATIONS  (PRN):  aluminum hydroxide/magnesium hydroxide/simethicone Suspension 30 milliLiter(s) Oral every 6 hours PRN Dyspepsia  dextrose 40% Gel 15 Gram(s) Oral once PRN Blood Glucose LESS THAN 70 milliGRAM(s)/deciliter  glucagon  Injectable 1 milliGRAM(s) IntraMuscular once PRN Glucose LESS THAN 70 milligrams/deciliter  ondansetron Injectable 4 milliGRAM(s) IV Push every 6 hours PRN Nausea and/or Vomiting  petrolatum Ophthalmic Ointment 1 Application(s) Both EYES at bedtime PRN dry eyes  sodium chloride 0.65% Nasal 1 Spray(s) Both Nostrils two times a day PRN Nasal Congestion

## 2019-01-09 NOTE — PROGRESS NOTE ADULT - PROBLEM SELECTOR PLAN 1
-test BG AC/HS  -C/w Lantus 8 units QHS  -c/w Humalog 4-4-3 w/meals (ensure pt is eating prior to giving dose)  -c/w Humalog low correction scale AC and Low HS scale   discharge plan: restart Metformin if off steroids  -Plan discussed with pt/team/staff. If discharge to rehab on steroid will continue basal/bolus therapy. Doses TBD  Contact info: [phone] (24/7). pager [phone]

## 2019-01-09 NOTE — PROGRESS NOTE ADULT - ASSESSMENT
59 y/o F w/h/o controlled T2DM on Metformin 500mg bid. Also h/o CAD and COPD now on prednisone 10 mg daily. Pt w/once a day hyperglycemia depending on PO intake and PO status as well as Prednisone doses. No pattern to hyperglycemia noted. S/p  monty/dccv. Will continue present insulin regimen for now until able to identify a hyperglycemic pattern. Primary team addressing LE edema with foot blister and redness.

## 2019-01-09 NOTE — OCCUPATIONAL THERAPY INITIAL EVALUATION ADULT - LIVES WITH, PROFILE
Pt lives in private home with ~5 steps to enter, walk in shower with shower chair and grab bars. Pt I in ADLs and ambulation prior to admission

## 2019-01-09 NOTE — OCCUPATIONAL THERAPY INITIAL EVALUATION ADULT - PRECAUTIONS/LIMITATIONS, REHAB EVAL
pt says it is white in color, denies purulence. +inc O2 requirement now up to 4LNC at home during last 2 wks. +inc rescue inhaler use upto 7x/daily with minimal improvement.  Denies cough. No significant inc in wheezing. +some runny nose but denies myalgias, sick contacts, sore throat; +flu shot this year.  Pt denies cp, denies f/c, denies n/v/d, denies orthopnea or significant increase in LE edema. Pt saw her pcp/pulm Dr. Mathew who started her on 10 d course of biaxin and prednisone 60 mg qd, which she has tapered down to 30 mg. Pt has completed a full 7 days of biaxin. Was told to go to ER last week for eval but tried to manage at home, now her sx have progressed. pt says it is white in color, denies purulence. +inc O2 requirement now up to 4LNC at home during last 2 wks. +inc rescue inhaler use upto 7x/daily with minimal improvement.  Denies cough. No significant inc in wheezing. +some runny nose but denies myalgias, sick contacts, sore throat; +flu shot this year.  Pt denies cp, denies f/c, denies n/v/d, denies orthopnea or significant increase in LE edema. Pt saw her pcp/pulm Dr. Mathew who started her on 10 d course of biaxin and prednisone 60 mg qd, which she has tapered down to 30 mg. Pt has completed a full 7 days of biaxin. Was told to go to ER last week for eval but tried to manage at home, now her sx have progressed./fall precautions

## 2019-01-09 NOTE — PROGRESS NOTE ADULT - ASSESSMENT
ASSESSMENT:    atrial arrythmias with RVR in the setting of severe lung disease, long-term beta-agonist use, elevated sympathetic tone due to critical illness and anxiety - s/p successful DCCV    ongoing dyspnea with minimal exertion with chronic hypoxic and hypercapnic respiratory failure due to very severe COPD with "exacerbation" - adequate oxygenation on a nasal canula in the setting of profound dyspnea suggests that alterations in the mechanics of breathing due to lung hyperinflation and obesity are likely playing a significant role in her shortness of breath - anxiety is also playing a role - there is evidence of CAD without pulmonary hypertension on the CT scan which is without pulmonary emboli or pneumonia - cardiac catheterization last year revealed patent stents - ECHO has a preserved LVEF, no significant valvular heart disease and no pulmonary hypertension - resolved AURORA, hyperkalemia and hyponatremia - resolved respiratory acidosis on repeat ABG and the pCO2 level has decreased from the 80s back to the 60s - lower extremity weakness likely due to steroid induced myopathy perhaps exacerbated by statins is improving    extremity edema/discomfort likely related to steroid induced fluid retention - no evidence of DVT on repeat upper or lower extremity Duplex examination - resolved upper extremity edema - improving lower extremity edema with bedrest - feet now with worsening swelling, bullae, erythema and pain    HTN/HLD/DM    CAD s/p PCI x 6    PAD    PLAN/RECOMMENDATIONS:    BIPAP 12/5 with 40% FiO2 for sleep - on and off during the day only as a last resort for increased work of breathing or recurrent respiratory acidosis - we need to wean her off daytime BIPAP for transfer to an acute rehabilitation center  oxygen supplementation to keep saturation greater than 92% using a nasal canula when off BIPAP  following ABG  sputum culture - no growth x2 - has completed a course of biaxin  home trilogy vent has been arranged with [facility]   xopenex/atrovent nebs q6h   pulmicort 0.5mg nebs q12h  singulair/theophylline - theophylline level is subtherapeutic - daliresp discontinued  prednisone 10mg daily - decrease by 5mg every 5 days - glucose control - nystatin   azithromycin TIW for antiinflammatory effects  cardiology and EP service recommendations noted - off amiodarone and beta blockers  cardiac meds: ASA/eliquis/imdur/lasix/diltiazem - restarted on zocor despite a possible statin related possible myopathy - off norvasc due to swelling - BP control adequate  diurese as tolerated by renal function and hemodynamics - watch for worsening metabolic alkalosis with loop diuretic use which could lead to worsening hypercapnia  GI/DVT prophylaxis - protoix/eliquis  physical therapy daily  leg elevation  dermatology evaluation  transfer to acute rehab when able  outpatient evaluation for lung transplantation    Will follow with you. Plan of care discussed with the patient at bedside, Dr. Ronquillo and the . I have reached out to the [facility] transplant team. They would like the patient reevaluated for possible transfer to [facility] acute rehab when stable where they will be able to evaluate the patient for possible lung transplantation and perform appropriate testing as needed.    David Fair MD, [facility] - [phone]  Pulmonary Medicine

## 2019-01-09 NOTE — PROGRESS NOTE ADULT - SUBJECTIVE AND OBJECTIVE BOX
CARDIOLOGY FOLLOW UP - Dr. Brunson    CC no cp/sob   c/o b.l le edema, blisters, rash       PHYSICAL EXAM:  T(C): 36.6 (01-09-19 @ 06:20), Max: 37 (01-08-19 @ 22:22)  HR: 93 (01-09-19 @ 10:53) (68 - 93)  BP: 139/81 (01-09-19 @ 06:20) (134/78 - 150/84)  RR: 18 (01-09-19 @ 06:20) (18 - 18)  SpO2: 100% (01-09-19 @ 10:53) (100% - 100%)  Wt(kg): --  I&O's Summary    08 Jan 2019 07:01  -  09 Jan 2019 07:00  --------------------------------------------------------  IN: 600 mL / OUT: 0 mL / NET: 600 mL    09 Jan 2019 07:01  -  09 Jan 2019 11:29  --------------------------------------------------------  IN: 120 mL / OUT: 0 mL / NET: 120 mL        Appearance: Normal	  Cardiovascular: Normal S1 S2,RRR, No JVD, No murmurs  Respiratory: dimished   Gastrointestinal:  Soft, Non-tender, + BS	  Extremities: Normal range of motion, No clubbing, b.l le edema, blisters, rash         MEDICATIONS  (STANDING):  apixaban 5 milliGRAM(s) Oral every 12 hours  artificial tears (preservative free) Ophthalmic Solution 1 Drop(s) Both EYES three times a day  aspirin enteric coated 81 milliGRAM(s) Oral daily  azithromycin   Tablet 250 milliGRAM(s) Oral <User Schedule>  Biotene Dry Mouth Oral Rinse 5 milliLiter(s) Swish and Spit two times a day  buDESOnide   0.5 milliGRAM(s) Respule 0.5 milliGRAM(s) Inhalation every 12 hours  calamine Lotion 1 Application(s) Topical two times a day  cholecalciferol 2000 Unit(s) Oral daily  dextrose 5%. 1000 milliLiter(s) (50 mL/Hr) IV Continuous <Continuous>  dextrose 50% Injectable 12.5 Gram(s) IV Push once  dextrose 50% Injectable 25 Gram(s) IV Push once  dextrose 50% Injectable 25 Gram(s) IV Push once  diltiazem    Tablet 60 milliGRAM(s) Oral every 6 hours  docusate sodium 100 milliGRAM(s) Oral three times a day  furosemide    Tablet 40 milliGRAM(s) Oral daily  furosemide   Injectable 20 milliGRAM(s) IV Push once  insulin glargine Injectable (LANTUS) 8 Unit(s) SubCutaneous at bedtime  insulin lispro (HumaLOG) corrective regimen sliding scale   SubCutaneous three times a day before meals  insulin lispro (HumaLOG) corrective regimen sliding scale   SubCutaneous at bedtime  insulin lispro Injectable (HumaLOG) 4 Unit(s) SubCutaneous before breakfast  insulin lispro Injectable (HumaLOG) 3 Unit(s) SubCutaneous with dinner  insulin lispro Injectable (HumaLOG) 4 Unit(s) SubCutaneous before lunch  ipratropium    for Nebulization 500 MICROGram(s) Nebulizer every 6 hours  isosorbide   mononitrate ER Tablet (IMDUR) 30 milliGRAM(s) Oral daily  levalbuterol Inhalation 0.63 milliGRAM(s) Inhalation every 6 hours  melatonin 1 milliGRAM(s) Oral at bedtime  montelukast 10 milliGRAM(s) Oral daily  multivitamin 1 Tablet(s) Oral daily  pantoprazole    Tablet 40 milliGRAM(s) Oral before breakfast  polyethylene glycol 3350 17 Gram(s) Oral daily  predniSONE   Tablet   Oral   predniSONE   Tablet 10 milliGRAM(s) Oral daily  senna 2 Tablet(s) Oral at bedtime  simethicone 80 milliGRAM(s) Chew once  simvastatin 10 milliGRAM(s) Oral at bedtime  theophylline ER Capsule 400 milliGRAM(s) Oral daily      TELEMETRY: nsr     ECG:  	  RADIOLOGY:   DIAGNOSTIC TESTING:  [ ] Echocardiogram:  [ ]  Catheterization:  [ ] Stress Test:    OTHER: 	    LABS:	 	                                10.0   10.1  )-----------( 418      ( 08 Jan 2019 06:33 )             29.3     01-08    138  |  89<L>  |  27<H>  ----------------------------<  167<H>  3.8   |  37<H>  |  1.18    Ca    9.4      08 Jan 2019 06:33  Mg     2.0     01-08

## 2019-01-09 NOTE — PROGRESS NOTE ADULT - PROBLEM SELECTOR PLAN 5
A1C 7.2, controlled  - c/w Lantus 8 units QHS  - c/w Humalog  4-4-3 w/meals   - Continue to monitor blood sugars.  - Endocrine following

## 2019-01-10 LAB
GLUCOSE BLDC GLUCOMTR-MCNC: 149 MG/DL — HIGH (ref 70–99)
GLUCOSE BLDC GLUCOMTR-MCNC: 174 MG/DL — HIGH (ref 70–99)
GLUCOSE BLDC GLUCOMTR-MCNC: 201 MG/DL — HIGH (ref 70–99)
GLUCOSE BLDC GLUCOMTR-MCNC: 218 MG/DL — HIGH (ref 70–99)
GLUCOSE BLDC GLUCOMTR-MCNC: 255 MG/DL — HIGH (ref 70–99)

## 2019-01-10 PROCEDURE — 99232 SBSQ HOSP IP/OBS MODERATE 35: CPT

## 2019-01-10 RX ORDER — INSULIN GLARGINE 100 [IU]/ML
9 INJECTION, SOLUTION SUBCUTANEOUS AT BEDTIME
Qty: 0 | Refills: 0 | Status: DISCONTINUED | OUTPATIENT
Start: 2019-01-10 | End: 2019-01-11

## 2019-01-10 RX ORDER — MUPIROCIN 20 MG/G
1 OINTMENT TOPICAL
Qty: 0 | Refills: 0 | Status: DISCONTINUED | OUTPATIENT
Start: 2019-01-10 | End: 2019-01-11

## 2019-01-10 RX ADMIN — Medication 0.5 MILLIGRAM(S): at 05:52

## 2019-01-10 RX ADMIN — Medication 500 MICROGRAM(S): at 11:18

## 2019-01-10 RX ADMIN — Medication 3 UNIT(S): at 17:36

## 2019-01-10 RX ADMIN — ISOSORBIDE MONONITRATE 30 MILLIGRAM(S): 60 TABLET, EXTENDED RELEASE ORAL at 11:16

## 2019-01-10 RX ADMIN — INSULIN GLARGINE 9 UNIT(S): 100 INJECTION, SOLUTION SUBCUTANEOUS at 22:16

## 2019-01-10 RX ADMIN — Medication 400 MILLIGRAM(S): at 11:16

## 2019-01-10 RX ADMIN — Medication 3: at 13:14

## 2019-01-10 RX ADMIN — Medication 4 UNIT(S): at 13:14

## 2019-01-10 RX ADMIN — Medication 1 DROP(S): at 05:50

## 2019-01-10 RX ADMIN — Medication 100 MILLIGRAM(S): at 21:55

## 2019-01-10 RX ADMIN — Medication 10 MILLIGRAM(S): at 05:51

## 2019-01-10 RX ADMIN — Medication 500 MICROGRAM(S): at 17:36

## 2019-01-10 RX ADMIN — Medication 500 MICROGRAM(S): at 05:51

## 2019-01-10 RX ADMIN — Medication 4 UNIT(S): at 10:39

## 2019-01-10 RX ADMIN — Medication 1 TABLET(S): at 11:16

## 2019-01-10 RX ADMIN — LEVALBUTEROL 0.63 MILLIGRAM(S): 1.25 SOLUTION, CONCENTRATE RESPIRATORY (INHALATION) at 05:51

## 2019-01-10 RX ADMIN — CALAMINE AND ZINC OXIDE AND PHENOL 1 APPLICATION(S): 160; 10 LOTION TOPICAL at 17:36

## 2019-01-10 RX ADMIN — SIMVASTATIN 10 MILLIGRAM(S): 20 TABLET, FILM COATED ORAL at 21:55

## 2019-01-10 RX ADMIN — APIXABAN 5 MILLIGRAM(S): 2.5 TABLET, FILM COATED ORAL at 05:51

## 2019-01-10 RX ADMIN — Medication 0.5 MILLIGRAM(S): at 17:35

## 2019-01-10 RX ADMIN — Medication 2000 UNIT(S): at 11:17

## 2019-01-10 RX ADMIN — Medication 500 MICROGRAM(S): at 00:19

## 2019-01-10 RX ADMIN — AZITHROMYCIN 250 MILLIGRAM(S): 500 TABLET, FILM COATED ORAL at 10:24

## 2019-01-10 RX ADMIN — Medication 2: at 10:38

## 2019-01-10 RX ADMIN — LEVALBUTEROL 0.63 MILLIGRAM(S): 1.25 SOLUTION, CONCENTRATE RESPIRATORY (INHALATION) at 00:19

## 2019-01-10 RX ADMIN — APIXABAN 5 MILLIGRAM(S): 2.5 TABLET, FILM COATED ORAL at 13:13

## 2019-01-10 RX ADMIN — Medication 1 APPLICATION(S): at 21:55

## 2019-01-10 RX ADMIN — Medication 1 MILLIGRAM(S): at 21:55

## 2019-01-10 RX ADMIN — Medication 40 MILLIGRAM(S): at 05:51

## 2019-01-10 RX ADMIN — LEVALBUTEROL 0.63 MILLIGRAM(S): 1.25 SOLUTION, CONCENTRATE RESPIRATORY (INHALATION) at 17:36

## 2019-01-10 RX ADMIN — MONTELUKAST 10 MILLIGRAM(S): 4 TABLET, CHEWABLE ORAL at 11:16

## 2019-01-10 RX ADMIN — Medication 81 MILLIGRAM(S): at 11:16

## 2019-01-10 RX ADMIN — Medication 240 MILLIGRAM(S): at 05:51

## 2019-01-10 RX ADMIN — SENNA PLUS 2 TABLET(S): 8.6 TABLET ORAL at 21:55

## 2019-01-10 RX ADMIN — LEVALBUTEROL 0.63 MILLIGRAM(S): 1.25 SOLUTION, CONCENTRATE RESPIRATORY (INHALATION) at 11:18

## 2019-01-10 RX ADMIN — Medication 100 MILLIGRAM(S): at 05:51

## 2019-01-10 RX ADMIN — Medication 5 MILLILITER(S): at 05:52

## 2019-01-10 RX ADMIN — PANTOPRAZOLE SODIUM 40 MILLIGRAM(S): 20 TABLET, DELAYED RELEASE ORAL at 05:52

## 2019-01-10 RX ADMIN — Medication 1 DROP(S): at 13:14

## 2019-01-10 RX ADMIN — CALAMINE AND ZINC OXIDE AND PHENOL 1 APPLICATION(S): 160; 10 LOTION TOPICAL at 05:50

## 2019-01-10 RX ADMIN — Medication 100 MILLIGRAM(S): at 13:13

## 2019-01-10 NOTE — CONSULT NOTE ADULT - ASSESSMENT
58 year old woman bilateral dorsal excoriations vs. fissures  - Pt was examined and evaluated  - Using normal saline, lotion was cleaned from painful areas showing areas of fissures vs. possible excoriations.   - No acute signs of infection  - No pod surg interventions  - Rec cortisone cream to LEFT foot fissure site and bactroban to RIGHT foot fissure sites. Followed by 4x4 sterile gauze and Tab.  - These areas are not the issue that is causing patient to not ambulate; her pain with ambulation is 2/2 fluid retention. Rec leg elevation while in bed.   - D/w attending

## 2019-01-10 NOTE — PROGRESS NOTE ADULT - ASSESSMENT
59 y/o F w/h/o controlled T2DM on Metformin 500mg bid. Also h/o CAD and COPD now on prednisone 10 mg daily. BG values rising overnight last 2 days on current basal insulin dose. Will slightly increase to improve glycemic control. BG values elevated at times during day but w/out clear pattern as pt does not always consume consistent amounts of carbs to warrant any dose change at this time given past hypoglycemic events. BG goal (100-180mg/dl).

## 2019-01-10 NOTE — CONSULT NOTE ADULT - PROVIDER SPECIALTY LIST ADULT
CCU
ENT
Electrophysiology
Endocrinology
Heart Failure
Podiatry
Pulmonology
Pulmonology
Rehab Medicine
Vascular Cardiology
Nephrology
Cardiology

## 2019-01-10 NOTE — PROGRESS NOTE ADULT - SUBJECTIVE AND OBJECTIVE BOX
CARDIOLOGY FOLLOW UP - Dr. Brunson    CC no cp/sob         PHYSICAL EXAM:  T(C): 36.7 (01-10-19 @ 05:48), Max: 36.7 (01-10-19 @ 05:48)  HR: 72 (01-10-19 @ 08:24) (71 - 93)  BP: 160/74 (01-10-19 @ 05:48) (144/74 - 160/74)  RR: 18 (01-10-19 @ 05:48) (18 - 18)  SpO2: 99% (01-10-19 @ 08:24) (98% - 100%)  Wt(kg): --  I&O's Summary    09 Jan 2019 07:01  -  10 Aroldo 2019 07:00  --------------------------------------------------------  IN: 480 mL / OUT: 900 mL / NET: -420 mL        Appearance: Normal	  Cardiovascular: Normal S1 S2,RRR, No JVD, No murmurs  Respiratory: diminsihed   Gastrointestinal:  Soft, Non-tender, + BS	  Extremities: Normal range of motion, No clubbing, b/l le edema, L foot redness, +blister         MEDICATIONS  (STANDING):  apixaban 5 milliGRAM(s) Oral every 12 hours  artificial tears (preservative free) Ophthalmic Solution 1 Drop(s) Both EYES three times a day  aspirin enteric coated 81 milliGRAM(s) Oral daily  azithromycin   Tablet 250 milliGRAM(s) Oral <User Schedule>  Biotene Dry Mouth Oral Rinse 5 milliLiter(s) Swish and Spit two times a day  buDESOnide   0.5 milliGRAM(s) Respule 0.5 milliGRAM(s) Inhalation every 12 hours  calamine Lotion 1 Application(s) Topical two times a day  cholecalciferol 2000 Unit(s) Oral daily  dextrose 5%. 1000 milliLiter(s) (50 mL/Hr) IV Continuous <Continuous>  dextrose 50% Injectable 12.5 Gram(s) IV Push once  dextrose 50% Injectable 25 Gram(s) IV Push once  dextrose 50% Injectable 25 Gram(s) IV Push once  diltiazem    milliGRAM(s) Oral daily  docusate sodium 100 milliGRAM(s) Oral three times a day  furosemide    Tablet 40 milliGRAM(s) Oral daily  insulin glargine Injectable (LANTUS) 8 Unit(s) SubCutaneous at bedtime  insulin lispro (HumaLOG) corrective regimen sliding scale   SubCutaneous three times a day before meals  insulin lispro (HumaLOG) corrective regimen sliding scale   SubCutaneous at bedtime  insulin lispro Injectable (HumaLOG) 4 Unit(s) SubCutaneous before breakfast  insulin lispro Injectable (HumaLOG) 3 Unit(s) SubCutaneous with dinner  insulin lispro Injectable (HumaLOG) 4 Unit(s) SubCutaneous before lunch  ipratropium    for Nebulization 500 MICROGram(s) Nebulizer every 6 hours  isosorbide   mononitrate ER Tablet (IMDUR) 30 milliGRAM(s) Oral daily  levalbuterol Inhalation 0.63 milliGRAM(s) Inhalation every 6 hours  melatonin 1 milliGRAM(s) Oral at bedtime  montelukast 10 milliGRAM(s) Oral daily  multivitamin 1 Tablet(s) Oral daily  pantoprazole    Tablet 40 milliGRAM(s) Oral before breakfast  polyethylene glycol 3350 17 Gram(s) Oral daily  predniSONE   Tablet   Oral   predniSONE   Tablet 10 milliGRAM(s) Oral daily  senna 2 Tablet(s) Oral at bedtime  simethicone 80 milliGRAM(s) Chew once  simvastatin 10 milliGRAM(s) Oral at bedtime  theophylline ER Capsule 400 milliGRAM(s) Oral daily      TELEMETRY: NSR 	    ECG:  	  RADIOLOGY:   DIAGNOSTIC TESTING:  [ ] Echocardiogram:  [ ]  Catheterization:  [ ] Stress Test:    OTHER: 	    LABS:	 	                                10.8   11.1  )-----------( 444      ( 09 Jan 2019 16:11 )             33.0     01-09    137  |  84<L>  |  22  ----------------------------<  234<H>  3.7   |  38<H>  |  1.10    Ca    9.9      09 Jan 2019 16:11  Mg     1.7     01-09    TPro  6.9  /  Alb  4.2  /  TBili  0.4  /  DBili  x   /  AST  20  /  ALT  45  /  AlkPhos  56  01-09

## 2019-01-10 NOTE — CONSULT NOTE ADULT - REASON FOR ADMISSION
This writer spoke with , Danny Blanco, pediatric lung care provider, Ting Pike, and pediatric GI care provider, Siria Cordero to discuss coordinating the patients care. It was agreed in this meeting that this writer would encourage the patients mother to schedule back-to-back appointments with Siria Brannon and Danny Blanco to facilitate care coordination. dyspnea

## 2019-01-10 NOTE — PROGRESS NOTE ADULT - PROBLEM SELECTOR PLAN 8
dvt ppx: on apixaban  dispo: transfer to [facility] acute rehab. per CM, possibly bed by friday? pending wound care f/u

## 2019-01-10 NOTE — PROGRESS NOTE ADULT - SUBJECTIVE AND OBJECTIVE BOX
[facility] - [facility]     PROGRESS NOTE    CHIEF COMPLAINT: chronic hypoxic/hypercapnic respiratory failure; COPD exacerbation; emphysema; dyspnea; obesity; atrial fibrillation with RVR (resolved)    INTERVAL HISTORY: much less foot swelling with pain on the top of the feet due to tiny lacerations - being seen at present by podiatry; s/p successful DCCV - remains in NSR - off amiodarone and beta-blockers; slept in bed with BIPAP for several hours; off BIPAP all day yesterday without shortness of breath on a nasal canula @ 4lpm; ABG with improved hypercapnia without acidemia; arm swelling has resolved on diuretics and reduced dose of steroids; resolved AURORA, hyperkalemia and hyponatremia; no cough, sputum production, chest congestion or wheeze; no fevers, chills or sweats; no chest pain/pressure or palpitations; decreased lower extremity swelling with leg elevation     REVIEW OF SYSTEMS:  Constitutional: As per interval history  HEENT: Within normal limits  CV: As per interval history  Resp: As per interval history  GI: Within normal limits   : Within normal limits  Musculoskeletal: improving lower extremity weakness   Skin: Within normal limits  Neurological: Within normal limits  Psychiatric: frustrated   Endocrine: Within normal limits  Hematologic/Lymphatic: Within normal limits  Allergic/Immunologic: Within normal limits    MEDICATIONS:     Pulmonary "  buDESOnide   0.5 milliGRAM(s) Respule 0.5 milliGRAM(s) Inhalation every 12 hours  ipratropium    for Nebulization 500 MICROGram(s) Nebulizer every 6 hours  levalbuterol Inhalation 0.63 milliGRAM(s) Inhalation every 6 hours  montelukast 10 milliGRAM(s) Oral daily  theophylline ER Capsule 400 milliGRAM(s) Oral daily      Anti-microbials:  azithromycin   Tablet 250 milliGRAM(s) Oral <User Schedule>      Cardiovascular:  diltiazem    milliGRAM(s) Oral daily  furosemide    Tablet 40 milliGRAM(s) Oral daily  isosorbide   mononitrate ER Tablet (IMDUR) 30 milliGRAM(s) Oral daily      Other:  aluminum hydroxide/magnesium hydroxide/simethicone Suspension 30 milliLiter(s) Oral every 6 hours PRN  apixaban 5 milliGRAM(s) Oral every 12 hours  artificial tears (preservative free) Ophthalmic Solution 1 Drop(s) Both EYES three times a day  aspirin enteric coated 81 milliGRAM(s) Oral daily  Biotene Dry Mouth Oral Rinse 5 milliLiter(s) Swish and Spit two times a day  calamine Lotion 1 Application(s) Topical two times a day  cholecalciferol 2000 Unit(s) Oral daily  dextrose 40% Gel 15 Gram(s) Oral once PRN  dextrose 5%. 1000 milliLiter(s) IV Continuous <Continuous>  dextrose 50% Injectable 12.5 Gram(s) IV Push once  dextrose 50% Injectable 25 Gram(s) IV Push once  dextrose 50% Injectable 25 Gram(s) IV Push once  docusate sodium 100 milliGRAM(s) Oral three times a day  glucagon  Injectable 1 milliGRAM(s) IntraMuscular once PRN  insulin glargine Injectable (LANTUS) 8 Unit(s) SubCutaneous at bedtime  insulin lispro (HumaLOG) corrective regimen sliding scale   SubCutaneous three times a day before meals  insulin lispro (HumaLOG) corrective regimen sliding scale   SubCutaneous at bedtime  insulin lispro Injectable (HumaLOG) 4 Unit(s) SubCutaneous before breakfast  insulin lispro Injectable (HumaLOG) 3 Unit(s) SubCutaneous with dinner  insulin lispro Injectable (HumaLOG) 4 Unit(s) SubCutaneous before lunch  melatonin 1 milliGRAM(s) Oral at bedtime  multivitamin 1 Tablet(s) Oral daily  ondansetron Injectable 4 milliGRAM(s) IV Push every 6 hours PRN  pantoprazole    Tablet 40 milliGRAM(s) Oral before breakfast  petrolatum Ophthalmic Ointment 1 Application(s) Both EYES at bedtime PRN  polyethylene glycol 3350 17 Gram(s) Oral daily  predniSONE   Tablet   Oral   predniSONE   Tablet 10 milliGRAM(s) Oral daily  senna 2 Tablet(s) Oral at bedtime  simethicone 80 milliGRAM(s) Chew once  simvastatin 10 milliGRAM(s) Oral at bedtime  sodium chloride 0.65% Nasal 1 Spray(s) Both Nostrils two times a day PRN        OBJECTIVE:    I&O's Detail    09 Jan 2019 07:01  -  10 Aroldo 2019 07:00  --------------------------------------------------------  IN:    Oral Fluid: 480 mL  Total IN: 480 mL    OUT:    Voided: 900 mL  Total OUT: 900 mL    Total NET: -420 mL      10 Aroldo 2019 07:01  -  10 Aroldo 2019 15:08  --------------------------------------------------------  IN:  Total IN: 0 mL    OUT:    Voided: 400 mL  Total OUT: 400 mL    Total NET: -400 mL    POCT Blood Glucose.: 255 mg/dL (10 Aroldo 2019 13:11)  POCT Blood Glucose.: 218 mg/dL (10 Aroldo 2019 10:14)  POCT Blood Glucose.: 198 mg/dL (09 Jan 2019 22:18)  POCT Blood Glucose.: 125 mg/dL (09 Jan 2019 17:05)      PHYSICAL EXAM:       ICU Vital Signs Last 24 Hrs  T(C): 36.7 (10 Aroldo 2019 05:48), Max: 36.7 (10 Aroldo 2019 05:48)  T(F): 98 (10 Aroldo 2019 05:48), Max: 98 (10 Aroldo 2019 05:48)  HR: 72 (10 Aroldo 2019 08:24) (71 - 93)  BP: 160/74 (10 Aroldo 2019 05:48) (144/74 - 160/74)  BP(mean): --  ABP: --  ABP(mean): --  RR: 18 (10 Aroldo 2019 05:48) (18 - 18)  SpO2: 99% (10 Aroldo 2019 08:24) (98% - 100%) on 4lpm nasal canula     General: Awake. Alert. Cooperative. No distress. Appears stated age. Obese. Cushingoid.  HEENT:  Atraumatic. Moonlike facies. Anicteric. Normal oral mucosa. PERRL. EOMI.   Neck: Supple. Trachea midline. Thyroid without enlargement/tenderness/nodules. No carotid bruit. No JVD.	  Cardiovascular: Regular rate and rhythm. Distant S1 S2. No murmurs, rubs or gallops.  Respiratory: Respirations unlabored. Markedly decreased breath sounds throughout. No wheeze. No curvature.  Abdomen: Soft. Non-tender. Non-distended. No organomegaly. No masses. Normal bowel sounds. Obese.  Extremities: Warm to touch. No clubbing or cyanosis. Mild bilateral lower extremity edema up to the thigh. Upper extremity swelling resolved. Bilateral foot swelling with bullae, erythema and tenderness to palpitation  Pulses: Decreased lower extremity peripheral pulses.  Skin: No rashes or lesions. No ecchymoses. No cyanosis. Warm to touch.   Lymph Nodes: Cervical, supraclavicular and axillary nodes normal  Neurological: Motor and sensory examination equal and normal. A and O x 3  Psychiatry: Calm.    LABS:                        10.8   11.1  )-----------( 444      ( 09 Jan 2019 16:11 )             33.0     01-09    137  |  84<L>  |  22  ----------------------------<  234<H>  3.7   |  38<H>  |  1.10    01-08    138  |  89<L>  |  27<H>  ----------------------------<  167<H>  3.8   |  37<H>  |  1.18    Ca      9.9      01-09    Ca      9.4      01-08      Mg       1.7     01-09    Mg       2.0     01-08    TPro  6.9  /  Alb  4.2  /  TBili  0.4  /  DBili  x   /  AST  20  /  ALT  45  /  AlkPhos  56  01-09    ABG - ( 06 Jan 2019 06:44 )  pH: 7.39  /  pCO2: 68    /  pO2: 99    / HCO3: 40    / Base Excess: 13.3  /  SaO2: 98        ABG - ( 03 Jan 2019 06:40 )  pH: 7.44  /  pCO2: 65    /  pO2: 152   / HCO3: 43    / Base Excess: 15.9  /  SaO2: 97        ABG - ( 01 Jan 2019 03:44 )  pH: 7.42  /  pCO2: 69    /  pO2: 165   / HCO3: 44    / Base Excess: 17.3  /  SaO2: 97        ABG - ( 31 Dec 2018 16:19 )  pH: 7.38  /  pCO2: 84    /  pO2: 26    / HCO3: 48    / Base Excess: 20.2  /  SaO2: 41        ABG - ( 18 Dec 2018 13:23 )  pH: 7.46  /  pCO2: 47    /  pO2: 88    / HCO3: 33    / Base Excess: 8.0   /  SaO2: 97        ABG - ( 16 Dec 2018 20:53 )  pH: 7.44  /  pCO2: 53    /  pO2: 173   / HCO3: 36    / Base Excess: 10.0  /  SaO2: 100       ABG - ( 13 Dec 2018 06:29 )  pH: 7.30  /  pCO2: 71    /  pO2: 182   / HCO3: 34    / Base Excess: 5.8   /  SaO2: 99        ABG - ( 13 Dec 2018 00:59 )  pH: 7.32  /  pCO2: 71    /  pO2: 75    / HCO3: 35    / Base Excess: 7.1   /  SaO2: 95        ABG - ( 11 Dec 2018 11:45 )  pH: 7.39  /  pCO2: 54    /  pO2: 98    / HCO3: 32    / Base Excess: 6.2   /  SaO2: 98        ABG - ( 09 Dec 2018 11:26 )  pH: 7.35  /  pCO2: 63    /  pO2: 145   / HCO3: 34    / Base Excess: 6.6   /  SaO2: 99      ABG - ( 09 Dec 2018 00:28 )  pH: 7.32  /  pCO2: 71    /  pO2: 124   / HCO3: 36    / Base Excess: 7.6   /  SaO2: 99        ABG - ( 08 Dec 2018 20:50 )  pH: 7.32  /  pCO2: 72    /  pO2: 80    / HCO3: 36    / Base Excess: 7.7   /  SaO2: 95        Serum Pro-Brain Natriuretic Peptide: 254 pg/mL (12-13 @ 14:00)    < from: Transthoracic Echocardiogram (12.07.18 @ 08:59) >    Patient name: GUY HARTMAN  YOB: 1960   Age: 58 (F)   MR#: [id number]  Study Date: 12/7/2018  Location: [address] [place], [place] [id number]: Laquita Armando [facility]  Study quality: Technically good  Referring Physician: Allen ingram MD  BloodPressure: 120/80 mmHg  Height: 163 cm  Weight: 88 kg  BSA: 1.9 m2  ------------------------------------------------------------------------  PROCEDURE: Transthoracic echocardiogram with 2-D, M-Mode  and complete spectral and color flow Doppler.  INDICATION: Other forms of dyspnea (R06.09)  ------------------------------------------------------------------------  Dimensions:    Normal Values:  LA:     3.6    2.0 - 4.0 cm  Ao:     2.9    2.0 - 3.8 cm  SEPTUM: 0.9    0.6 - 1.2 cm  PWT:    0.8    0.6- 1.1 cm  LVIDd:  4.9    3.0 - 5.6 cm  LVIDs:  2.9    1.8 - 4.0 cm  Derived variables:  LVMI: 73 g/m2  RWT: 0.32  Fractional short: 40 %  EF (Teicholtz): 71 %  Doppler Peak Velocity (m/sec): AoV=1.2  ------------------------------------------------------------------------  Observations:  Mitral Valve: Normal mitral valve.  Aortic Valve/Aorta: Normal trileaflet aortic valve. Peak  transaortic valve gradient equals 6 mm Hg, mean transaortic  valve gradient equals 3 mm Hg, aortic valve velocity time  integral equals 22 cm. Trace aortic regurgitation.  Aortic Root: 2.9 cm.  Left Atrium: Normal left atrium.  LA volume index = 18  cc/m2.  Left Ventricle: Normal left ventricular systolic function.  No segmental wall motion abnormalities. Normal left  ventricular internal dimensions and wall thicknesses. Mild  diastolic dysfunction (Stage I).  Right Heart: Normal right atrium. Normal right ventricular  size and function. Normal tricuspid valve. Minimal  tricuspid regurgitation. Normal pulmonic valve.  Pericardium/Pleura: Normal pericardium with no pericardial  effusion.  Hemodynamic: Estimated right atrial pressure is 8 mm Hg.  Inadequate tricuspid regurgitation Doppler envelope  precludes estimation of RVSP.  ------------------------------------------------------------------------  Conclusions:  1. Normal mitral valve.  2. Normal trileaflet aortic valve. Peak transaortic valve  gradient equals 6 mm Hg, mean transaortic valve gradient  equals 3 mm Hg, aortic valve velocity time integral equals  22 cm. Trace aortic regurgitation.  3. Aortic Root: 2.9 cm.  4. Normal left atrium.  LA volume index = 18 cc/m2.  5. Normal left ventricular internal dimensions and wall  thicknesses.  6. Normal left ventricular systolic function. No segmental  wall motion abnormalities.  7. Mild diastolic dysfunction (Stage I).  8. Normal right ventricular size and function.  9. Inadequate tricuspid regurgitation Doppler envelope  precludes estimation of RVSP.  10. Normal tricuspid valve. Minimal tricuspid  regurgitation.  *** Compared with echocardiogram report of 2/18/2014, no  significant changes noted.  ------------------------------------------------------------------------  Confirmed on  12/7/2018 - 13:49:43 by Shefali Gómez M.D.  ------------------------------------------------------------------------    < end of copied text >  ------------------------------------------------------------------------------------------------  MICROBIOLOGY:     Culture - Sputum . (12.07.18 @ 08:34)    Gram Stain:   Rare polymorphonuclear leukocytes per low power field  Rare Squamous epithelial cells per low power field  Numerous Gram Positive Cocci in Pairs and Chains per oil power field    Specimen Source: .Sputum Sputum    Culture Results:   Normal Respiratory Samaria present    Culture - Sputum . (12.05.18 @ 22:50)    Gram Stain:   Moderate polymorphonuclear leukocytes per low power field  Few Squamous epithelial cells per low power field  Moderate Gram Positive Cocci in Pairs and Chains per oil power field    Specimen Source: .Sputum Sputum    Culture Results:   Normal Respiratory Samaria present    Rapid Respiratory Viral Panel (11.30.18 @ 01:30)    Rapid RVP Result: NotDete: The [employer] RVP Rapid uses polymerase chain reaction (PCR) and melt  curve analysis to screen for adenovirus; coronavirus HKU1, NL63, 229E,  OC43; human metapneumovirus (hMPV); human enterovirus/rhinovirus  (Entero/RV); influenza A; influenza A/H1;influenza A/H3; influenza  A/H1-2009; influenza B; parainfluenza viruses 1, 2, 3, 4; respiratory  syncytial virus; Bordetella pertussis; Mycoplasma pneumoniae; and  Chlamydophila pneumoniae.    RADIOLOGY:  [x] Chest radiographs reviewed and interpreted by me    < from: Xray Chest 1 View- PORTABLE-Urgent (01.05.19 @ 17:56) >    EXAM:  XR CHEST PORTABLE URGENT 1V                          PROCEDURE DATE:  01/05/2019      INTERPRETATION:  CLINICAL INFORMATION: Shortness of breath.    EXAM: Frontal radiograph of the chest.    COMPARISON: Chest radiograph from 12/25/2018    FINDINGS:  Heart size is normal.    The lungs are clear. No pneumothorax or pleural effusions.    Visualized osseous structures are unremarkable.    IMPRESSION: Clear lungs.    ADAM VAUGHN M.D., RADIOLOGY RESIDENT  This document has been electronically signed.  JEYSON SANCHEZ M.D., ATTENDING RADIOLOGIST  This document has been electronically signed. Jan 7 2019  3:28PM      < end of copied text >  ---------------------------------------------------------------------------------------------------------------    < from: [facility] Duplex Lower Ext Vein Scan, Bilat (12.30.18 @ 19:06) >    EXAM:  DUPLEX SCAN EXT VEINS LOWER BI                          PROCEDURE DATE:  12/30/2018      INTERPRETATION:  Clinical indication: Worsening edema.    Technique:  Grayscale, color Doppler and spectral Doppler ultrasound was   utilized toevaluate bilateral lower extremity deep venous system.      Comparison: 12/6/2018.    Findings: There is no thrombosis in bilateral common femoral veins,   femoral veins or popliteal veins. Visualized calf veins are patent. There   is bilateral lowerextremity soft tissue edema.    Impression:     No evidence of deep vein thrombosis in either lower extremity.    BROCK TOBIN M.D., ATTENDING RADIOLOGIST  This document has been electronically signed. Dec 30 2018  7:24PM     < end of copied text >  ---------------------------------------------------------------------------------------------------------------  < from: VA Duplex Upper Ext Vein Scan, Bilat (12.30.18 @ 19:05) >    EXAM:  DUPLEX SCAN EXT VEINS UPPER BI                          PROCEDURE DATE:  12/30/2018      INTERPRETATION:  Clinical information: Worsening bilateral upper   extremity edema.    Findings: Duplex evaluation of the deep venous system of the right and   left upper extremity was performed to include the brachiocephalic,   internal jugular, subclavian, axillary, brachial, radial and ulnar veins.   No echogenic thrombus is seen within the vein lumina. Complete coaptation   of those segments of vein accessible to compression was achieved.   Spontaneous venous flow with respiratory and cardiac pulsatility is noted   throughout.     The right innominate vein is patent. The left innominate vein was not   visualized.     The right and left basilic and cephalic veins are patent and compressible.    Impression: No duplex evidence of DVT in the right and left upper   extremity.    RAYMUNDO DUMONT M.D., ATTENDING RADIOLOGIST  This document has been electronically signed. Dec 31 2018  8:49AM     < end of copied text >  ---------------------------------------------------------------------------------------------------------------  < from: CT Angio Chest w/ IV Cont (12.04.18 @ 16:30) >    EXAM:  CT ANGIO CHEST (W)AW IC                          PROCEDURE DATE:  12/04/2018      INTERPRETATION:  CT CHEST WITH CONTRAST    INDICATION: Known COPD not responding to steroids and bronchodilators.   Progressively worsening dyspnea. Evaluate for pulmonary embolism.    IMPRESSION:   1.  No pulmonary embolism.  2. Emphysematous changes of the lung.    DIANA MEDINA M.D., RADIOLOGY RESIDENT  This document has been electronically signed.  JEYSON SANCHEZ M.D., ATTENDING RADIOLOGIST  This document has been electronically signed. Dec  4 2018  5:21PM      < end of copied text >  ---------------------------------------------------------------------------------------------------------------  SPIROMETRY:     FEV1 0.56 liters - 22% predicted  FVC 1.73 liters - 52% predicted  FEV1% 32

## 2019-01-10 NOTE — PROGRESS NOTE ADULT - PROBLEM SELECTOR PLAN 1
afib RVR s/p amiodarone, switched to CCB and tolerated well  s/p successful CV   - tolerated diltiazem 60 q6h per EP w/ adequate rate control. Switched today 1/10 to dilt 240 CR po qd  - monitor on tele  - no longer on amiodarone given lung disease  - c/w apixaban tolerating well

## 2019-01-10 NOTE — CONSULT NOTE ADULT - CONSULT REQUESTED BY NAME
Dr MANDEEP Mathew: Dr Leigh
Dr. Hernandez
Dr. Jason Chapa
Hossein
Medicine
Primary team
Reese
hospitalist
Dr. Gibson
Jason Chapa

## 2019-01-10 NOTE — PROGRESS NOTE ADULT - SUBJECTIVE AND OBJECTIVE BOX
Patient is a 58y old  Female who presents with a chief complaint of dyspnea (09 Jan 2019 17:57)        SUBJECTIVE / OVERNIGHT EVENTS:  overnight no acute events.  denies complaints.  denies n/v/f/chills, cp, sob.  states burning in feet is now resolved and no longer with foot pain while receiving calamine lotion.  does not want to leave the hospital to rehab if it is a friday, instead would prefer monday since it is the beginning of the week and I explained to patient that as she is medically stable and if there is a bed in the acute rehab facility, she will be ready to go and we will help assist with coordinating everything.    CAPILLARY BLOOD GLUCOSE  POCT Blood Glucose.: 218 mg/dL (10 Aroldo 2019 10:14)  POCT Blood Glucose.: 198 mg/dL (09 Jan 2019 22:18)  POCT Blood Glucose.: 125 mg/dL (09 Jan 2019 17:05)  POCT Blood Glucose.: 226 mg/dL (09 Jan 2019 12:58)    I&O's Summary    09 Jan 2019 07:01  -  10 Aroldo 2019 07:00  --------------------------------------------------------  IN: 480 mL / OUT: 900 mL / NET: -420 mL      Vital Signs Last 24 Hrs  T(C): 36.7 (10 Aroldo 2019 05:48), Max: 36.7 (10 Aroldo 2019 05:48)  T(F): 98 (10 Arlodo 2019 05:48), Max: 98 (10 Aroldo 2019 05:48)  HR: 72 (10 Aroldo 2019 08:24) (71 - 93)  BP: 160/74 (10 Aroldo 2019 05:48) (142/77 - 160/74)  BP(mean): --  RR: 18 (10 Aroldo 2019 05:48) (16 - 20)  SpO2: 99% (10 Aroldo 2019 08:24) (92% - 100%)    PHYSICAL EXAM:  GENERAL:  Well appearing, obese F, in NAD, w/ nasal cannula breathing comfortably  HEAD:  NCAT  EYES: PERRLA, conjunctiva clear  NECK: Supple  CHEST/LUNG: CTA B/L w/ decr breath sounds  HEART: Reg rate. Normal S1, S2. No m/r/g.   ABDOMEN: SNTND. Bowel sounds present  EXTREMITIES:  2+ Peripheral Pulses, No clubbing, cyanosis  2+ pitting LE edema b/l up to knees  b/l feet w/ 3+ pitting edema w/ superficial large blister of dorsum w/ mild surrounding erythema of L foot  moving toes, normal cap refill b/l, normal sensation  PSYCH: defensive mood, easily irritable, labile mood    LABS:                        10.8   11.1  )-----------( 444      ( 09 Jan 2019 16:11 )             33.0     01-09    137  |  84<L>  |  22  ----------------------------<  234<H>  3.7   |  38<H>  |  1.10    Ca    9.9      09 Jan 2019 16:11  Mg     1.7     01-09    TPro  6.9  /  Alb  4.2  /  TBili  0.4  /  DBili  x   /  AST  20  /  ALT  45  /  AlkPhos  56  01-09    RADIOLOGY & ADDITIONAL TESTS:  Consultant(s) Notes Reviewed:  endo, pulm, cards  Care Discussed with Consultants/Other Providers: Floor NP    MEDICATIONS  (STANDING):  apixaban 5 milliGRAM(s) Oral every 12 hours  artificial tears (preservative free) Ophthalmic Solution 1 Drop(s) Both EYES three times a day  aspirin enteric coated 81 milliGRAM(s) Oral daily  azithromycin   Tablet 250 milliGRAM(s) Oral <User Schedule>  Biotene Dry Mouth Oral Rinse 5 milliLiter(s) Swish and Spit two times a day  buDESOnide   0.5 milliGRAM(s) Respule 0.5 milliGRAM(s) Inhalation every 12 hours  calamine Lotion 1 Application(s) Topical two times a day  cholecalciferol 2000 Unit(s) Oral daily  dextrose 5%. 1000 milliLiter(s) (50 mL/Hr) IV Continuous <Continuous>  dextrose 50% Injectable 12.5 Gram(s) IV Push once  dextrose 50% Injectable 25 Gram(s) IV Push once  dextrose 50% Injectable 25 Gram(s) IV Push once  diltiazem    milliGRAM(s) Oral daily  docusate sodium 100 milliGRAM(s) Oral three times a day  furosemide    Tablet 40 milliGRAM(s) Oral daily  insulin glargine Injectable (LANTUS) 8 Unit(s) SubCutaneous at bedtime  insulin lispro (HumaLOG) corrective regimen sliding scale   SubCutaneous three times a day before meals  insulin lispro (HumaLOG) corrective regimen sliding scale   SubCutaneous at bedtime  insulin lispro Injectable (HumaLOG) 4 Unit(s) SubCutaneous before breakfast  insulin lispro Injectable (HumaLOG) 3 Unit(s) SubCutaneous with dinner  insulin lispro Injectable (HumaLOG) 4 Unit(s) SubCutaneous before lunch  ipratropium    for Nebulization 500 MICROGram(s) Nebulizer every 6 hours  isosorbide   mononitrate ER Tablet (IMDUR) 30 milliGRAM(s) Oral daily  levalbuterol Inhalation 0.63 milliGRAM(s) Inhalation every 6 hours  melatonin 1 milliGRAM(s) Oral at bedtime  montelukast 10 milliGRAM(s) Oral daily  multivitamin 1 Tablet(s) Oral daily  pantoprazole    Tablet 40 milliGRAM(s) Oral before breakfast  polyethylene glycol 3350 17 Gram(s) Oral daily  predniSONE   Tablet   Oral   predniSONE   Tablet 10 milliGRAM(s) Oral daily  senna 2 Tablet(s) Oral at bedtime  simethicone 80 milliGRAM(s) Chew once  simvastatin 10 milliGRAM(s) Oral at bedtime  theophylline ER Capsule 400 milliGRAM(s) Oral daily    MEDICATIONS  (PRN):  aluminum hydroxide/magnesium hydroxide/simethicone Suspension 30 milliLiter(s) Oral every 6 hours PRN Dyspepsia  dextrose 40% Gel 15 Gram(s) Oral once PRN Blood Glucose LESS THAN 70 milliGRAM(s)/deciliter  glucagon  Injectable 1 milliGRAM(s) IntraMuscular once PRN Glucose LESS THAN 70 milligrams/deciliter  ondansetron Injectable 4 milliGRAM(s) IV Push every 6 hours PRN Nausea and/or Vomiting  petrolatum Ophthalmic Ointment 1 Application(s) Both EYES at bedtime PRN dry eyes  sodium chloride 0.65% Nasal 1 Spray(s) Both Nostrils two times a day PRN Nasal Congestion

## 2019-01-10 NOTE — PROGRESS NOTE ADULT - PROBLEM SELECTOR PLAN 1
-test BG AC/HS  -Increase Lantus 9 units QHS  -c/w Humalog 4-4-3 w/meals  -c/w Humalog low correction scale AC and Low HS scale  discharge plan: restart Metformin if off steroids  -Plan discussed with pt/team/staff. If discharge to rehab on steroid will continue basal/bolus therapy. Doses TBD  pager: [phone]

## 2019-01-10 NOTE — PROGRESS NOTE ADULT - PROBLEM SELECTOR PLAN 3
b/l LE chronic for past 3+weeks, likely related to IVF during this admission and dCHF  mild improvement today 1/10  - keep b/l legs elevated  - wound care consult for superficial blister and dressing/ointment recs pending  - c/w po lasix as above  - calamine lotion for pain controlling pain well

## 2019-01-10 NOTE — CONSULT NOTE ADULT - CONSULT REQUESTED DATE/TIME
02-Dec-2018 14:19
02-Jan-2019 19:39
05-Jan-2019 16:27
06-Jan-2019 19:13
07-Dec-2018 20:11
10-Aroldo-2019 15:25
13-Dec-2018 14:03
15-Dec-2018 13:25
27-Dec-2018 10:02
30-Nov-2018 10:50
07-Dec-2018
18-Dec-2018 12:46

## 2019-01-10 NOTE — PROGRESS NOTE ADULT - ASSESSMENT
ASSESSMENT:    atrial arrythmias with RVR in the setting of severe lung disease, long-term beta-agonist use, elevated sympathetic tone due to critical illness and anxiety - s/p successful DCCV    ongoing dyspnea with exertion with chronic hypoxic and hypercapnic respiratory failure due to very severe COPD with progression of disease - adequate oxygenation on a nasal canula in the setting of profound dyspnea suggests that alterations in the mechanics of breathing due to lung hyperinflation and obesity are likely playing a significant role in her shortness of breath - anxiety is also playing a role - there is evidence of CAD without pulmonary hypertension on the CT scan which is without pulmonary emboli or pneumonia - cardiac catheterization last year revealed patent stents - ECHO has a preserved LVEF, no significant valvular heart disease and no pulmonary hypertension - resolved AURORA, hyperkalemia and hyponatremia - resolved respiratory acidosis on repeat ABG and the pCO2 level has decreased from the 80s back to the 60s - lower extremity weakness likely due to steroid induced myopathy perhaps exacerbated by statins is improving    extremity edema/discomfort likely related to steroid induced fluid retention - no evidence of DVT on repeat upper or lower extremity Duplex examination - resolved upper extremity edema - improving lower extremity edema with bedrest - feet now with worsening swelling, bullae, erythema and pain due to small lacerations    HTN/HLD/DM    CAD s/p PCI x 6    PAD    PLAN/RECOMMENDATIONS:    BIPAP 12/5 with 40% FiO2 for sleep - on during the day only as a last resort for increased work of breathing or recurrent respiratory acidosis - she has weaned off daytime BIPAP in anticipation of transfer to an acute rehabilitation center  oxygen supplementation to keep saturation greater than 92% using a nasal canula when off BIPAP  following ABG  sputum culture - no growth x2 - has completed a course of biaxin  home trilogy vent has been arranged with [facility]   xopenex/atrovent nebs q6h   pulmicort 0.5mg nebs q12h  singulair/theophylline - theophylline level is subtherapeutic - daliresp discontinued  prednisone 10mg daily - decrease by 5mg every 5 days - glucose control - watch for thrush off nystatin  azithromycin TIW for antiinflammatory effects  cardiology and EP service recommendations noted - off amiodarone and beta blockers  cardiac meds: ASA/eliquis/imdur/lasix/diltiazem CD/zocor (despite a possible statin related myopathy) - off norvasc due to swelling - BP control adequate  diurese as tolerated by renal function and hemodynamics - watch for worsening metabolic alkalosis with loop diuretic use which could lead to worsening hypercapnia  GI/DVT prophylaxis - protoix/eliquis  physical therapy daily  leg elevation  podiatry evaluation noted  transfer to acute rehab in the near future  outpatient evaluation for lung transplantation    Will follow with you. Plan of care discussed with the patient at bedside, the dedicated floor NP and the . I have reached out to the [facility] transplant team. They would like the patient reevaluated for possible transfer to [facility] acute rehab when stable where they will be able to evaluate the patient for possible lung transplantation and perform appropriate testing as needed.    David Fair MD, [facility] - [phone]  Pulmonary Medicine

## 2019-01-10 NOTE — CONSULT NOTE ADULT - SUBJECTIVE AND OBJECTIVE BOX
Patient is a 58y old  Female who presents with a chief complaint of dyspnea (10 Aroldo 2019 12:58)      HPI:  58 F PMH COPD on home O2 3L, HTN, HLD, CAD s/p x6 stents, T2DM, pHTN, PVD, p/w progressive worsening dyspnea x 2 weeks. Pt says she normally uses 3L NC atc at home. Infrequently leaves her house as of late. 2 wks ago, did leave her house a few times, once to go to pulm rehab, and felt much weaker than usual.  Has had increasing dyspnea and fatigue, worse with minimal exertion. Huffing/puffing for breath, sob with talking, showering, etc. +inc sputum volume production, pt says it is white in color, denies purulence. +inc O2 requirement now up to 4LNC at home during last 2 wks. +inc rescue inhaler use upto 7x/daily with minimal improvement.  Denies cough.  No significant inc in wheezing. +some runny nose but denies myalgias, sick contacts, sore throat; +flu shot this year.  Pt denies cp, denies f/c, denies n/v/d, denies orthopnea or significant increase in LE edema. Pt saw her pcp/pulm Dr. Mathew who started her on 10 d course of biaxin and prednisone 60 mg qd, which she has tapered down to 30 mg. Pt has completed a full 7 days of biaxin. Was told to go to ER last week for eval but tried to manage at home, now her sx have progressed.     Of note pt was prev evaluated for lung transplant at [place], but during testing had NSTEMI requiring PCI x 6 in 2016, and was then on Effient for 1 year and recommended repeat testing/rehab prior to subsequent evaluation.     VS: 97.9, 108, 131/69, 22, 96% 3LNC.  Labs: leukocytosis 12.1 with neutrophil predominance, rest of cbc unrevealing, cmp with relatively stable CKD2/3, hyperglycemia, vbg with compensated respiratory acidosis and normal lactate. CXR prelim no acute findings, no infiltrate. In ER pt received duonebs x 3, solumedrol 125 x1 prior to medicine team involvement. Pt endorses improvement in dyspnea since treatment in ER. (29 Nov 2018 21:28)      PAST MEDICAL & SURGICAL HISTORY:  Hyperlipemia  Chronic sinusitis  Raynaud phenomenon  HTN (hypertension)  DM (diabetes mellitus)  Claustrophobia  COPD (chronic obstructive pulmonary disease)  S/P tonsillectomy      MEDICATIONS  (STANDING):  apixaban 5 milliGRAM(s) Oral every 12 hours  artificial tears (preservative free) Ophthalmic Solution 1 Drop(s) Both EYES three times a day  aspirin enteric coated 81 milliGRAM(s) Oral daily  azithromycin   Tablet 250 milliGRAM(s) Oral <User Schedule>  Biotene Dry Mouth Oral Rinse 5 milliLiter(s) Swish and Spit two times a day  buDESOnide   0.5 milliGRAM(s) Respule 0.5 milliGRAM(s) Inhalation every 12 hours  calamine Lotion 1 Application(s) Topical two times a day  cholecalciferol 2000 Unit(s) Oral daily  dextrose 5%. 1000 milliLiter(s) (50 mL/Hr) IV Continuous <Continuous>  dextrose 50% Injectable 12.5 Gram(s) IV Push once  dextrose 50% Injectable 25 Gram(s) IV Push once  dextrose 50% Injectable 25 Gram(s) IV Push once  diltiazem    milliGRAM(s) Oral daily  docusate sodium 100 milliGRAM(s) Oral three times a day  furosemide    Tablet 40 milliGRAM(s) Oral daily  insulin glargine Injectable (LANTUS) 8 Unit(s) SubCutaneous at bedtime  insulin lispro (HumaLOG) corrective regimen sliding scale   SubCutaneous three times a day before meals  insulin lispro (HumaLOG) corrective regimen sliding scale   SubCutaneous at bedtime  insulin lispro Injectable (HumaLOG) 4 Unit(s) SubCutaneous before breakfast  insulin lispro Injectable (HumaLOG) 3 Unit(s) SubCutaneous with dinner  insulin lispro Injectable (HumaLOG) 4 Unit(s) SubCutaneous before lunch  ipratropium    for Nebulization 500 MICROGram(s) Nebulizer every 6 hours  isosorbide   mononitrate ER Tablet (IMDUR) 30 milliGRAM(s) Oral daily  levalbuterol Inhalation 0.63 milliGRAM(s) Inhalation every 6 hours  melatonin 1 milliGRAM(s) Oral at bedtime  montelukast 10 milliGRAM(s) Oral daily  multivitamin 1 Tablet(s) Oral daily  pantoprazole    Tablet 40 milliGRAM(s) Oral before breakfast  polyethylene glycol 3350 17 Gram(s) Oral daily  predniSONE   Tablet   Oral   predniSONE   Tablet 10 milliGRAM(s) Oral daily  senna 2 Tablet(s) Oral at bedtime  simethicone 80 milliGRAM(s) Chew once  simvastatin 10 milliGRAM(s) Oral at bedtime  theophylline ER Capsule 400 milliGRAM(s) Oral daily    MEDICATIONS  (PRN):  aluminum hydroxide/magnesium hydroxide/simethicone Suspension 30 milliLiter(s) Oral every 6 hours PRN Dyspepsia  dextrose 40% Gel 15 Gram(s) Oral once PRN Blood Glucose LESS THAN 70 milliGRAM(s)/deciliter  glucagon  Injectable 1 milliGRAM(s) IntraMuscular once PRN Glucose LESS THAN 70 milligrams/deciliter  ondansetron Injectable 4 milliGRAM(s) IV Push every 6 hours PRN Nausea and/or Vomiting  petrolatum Ophthalmic Ointment 1 Application(s) Both EYES at bedtime PRN dry eyes  sodium chloride 0.65% Nasal 1 Spray(s) Both Nostrils two times a day PRN Nasal Congestion      Allergies    No Known Allergies    Intolerances    albuterol (Unknown)      VITALS:    Vital Signs Last 24 Hrs  T(C): 36.7 (10 Aroldo 2019 05:48), Max: 36.7 (10 Aroldo 2019 05:48)  T(F): 98 (10 Aroldo 2019 05:48), Max: 98 (10 Aroldo 2019 05:48)  HR: 72 (10 Aroldo 2019 08:24) (71 - 93)  BP: 160/74 (10 Aroldo 2019 05:48) (144/74 - 160/74)  BP(mean): --  RR: 18 (10 Aroldo 2019 05:48) (18 - 18)  SpO2: 99% (10 Aroldo 2019 08:24) (98% - 100%)    LABS:                          10.8   11.1  )-----------( 444      ( 09 Jan 2019 16:11 )             33.0       01-09    137  |  84<L>  |  22  ----------------------------<  234<H>  3.7   |  38<H>  |  1.10    Ca    9.9      09 Jan 2019 16:11  Mg     1.7     01-09    TPro  6.9  /  Alb  4.2  /  TBili  0.4  /  DBili  x   /  AST  20  /  ALT  45  /  AlkPhos  56  01-09      CAPILLARY BLOOD GLUCOSE      POCT Blood Glucose.: 255 mg/dL (10 Aroldo 2019 13:11)  POCT Blood Glucose.: 218 mg/dL (10 Aroldo 2019 10:14)  POCT Blood Glucose.: 198 mg/dL (09 Jan 2019 22:18)  POCT Blood Glucose.: 125 mg/dL (09 Jan 2019 17:05)          LOWER EXTREMITY PHYSICAL EXAM:    Vasular: DP/PT 2/4, B/L, CFT <4 seconds B/L, Temperature gradient warm to warm, B/L.   Neuro: Epicritic sensation intact to the level of digits, B/L.  Musculoskeletal/Ortho: Pain at excoriations sites on lateral aspect of feet    Skin:  Poss excoriations vs. fissures located on the dorsal aspect of b/l feet down to subq. Different levels of healing process. Minimal erythema localized to RIGHT fissures. +2 pitting edema from digits to tibial tuberosity. Dorsal blister noted bilateral with clear fluid.     RADIOLOGY & ADDITIONAL STUDIES:

## 2019-01-10 NOTE — PROGRESS NOTE ADULT - SUBJECTIVE AND OBJECTIVE BOX
Diabetes Follow up note:  Interval Hx:  57 y/o F w/h/o controlled T2DM on Metformin 500mg bid. Also h/o CAD and COPD. Here with COPD exacerbation>on prednisone taper (currently day 2 of 5) on 10mg then to decrease to 5mg. BG values elevated in 200s today. Pt denies any overnight eating, reports eating her snack of apple slices w/peanut butter at bedtime. Consulted by Podiatry earlier who dressed her feet. Reports fair appetite overall w/hospital food.       Review of Systems:  General: Endorses upper extremity and foot edema improved. + foot pain w/blisters and ambulation.   GI: Tolerating POs without any N/V/D/ABD PAIN.  CV: No CP/SOB  ENDO: No S&Sx of hypoglycemia  MEDS:  insulin glargine Injectable (LANTUS) 8 Unit(s) SubCutaneous at bedtime  insulin lispro (HumaLOG) corrective regimen sliding scale   SubCutaneous three times a day before meals  insulin lispro (HumaLOG) corrective regimen sliding scale   SubCutaneous at bedtime  insulin lispro Injectable (HumaLOG) 4 Unit(s) SubCutaneous before breakfast  insulin lispro Injectable (HumaLOG) 3 Unit(s) SubCutaneous with dinner  insulin lispro Injectable (HumaLOG) 4 Unit(s) SubCutaneous before lunch  predniSONE   Tablet   Oral   predniSONE   Tablet 10 milliGRAM(s) Oral daily  simvastatin 10 milliGRAM(s) Oral at bedtime    azithromycin   Tablet 250 milliGRAM(s) Oral <User Schedule>    Allergies    No Known Allergies    Intolerances    albuterol (Unknown)    PE:  General: Female lying in bed. NAD.   Vital Signs Last 24 Hrs  T(C): 36.7 (10 Aroldo 2019 05:48), Max: 36.7 (10 Aroldo 2019 05:48)  T(F): 98 (10 Aroldo 2019 05:48), Max: 98 (10 Aroldo 2019 05:48)  HR: 72 (10 Aroldo 2019 08:24) (71 - 93)  BP: 160/74 (10 Aroldo 2019 05:48) (144/74 - 160/74)  BP(mean): --  RR: 18 (10 Aroldo 2019 05:48) (18 - 18)  SpO2: 99% (10 Aroldo 2019 08:24) (98% - 100%)  CV: S1, S2. NSR on monitor.   Abd: Soft, NT,ND, Obese.   Extremities: Warm. B/L LE edema w/foot dsgs intact.   Skin: Facial flushing  Neuro: A&O X3    LABS:    POCT Blood Glucose.: 255 mg/dL (01-10-19 @ 13:11)  POCT Blood Glucose.: 218 mg/dL (01-10-19 @ 10:14)  POCT Blood Glucose.: 198 mg/dL (01-09-19 @ 22:18)  POCT Blood Glucose.: 125 mg/dL (01-09-19 @ 17:05)  POCT Blood Glucose.: 226 mg/dL (01-09-19 @ 12:58)  POCT Blood Glucose.: 139 mg/dL (01-09-19 @ 08:31)  POCT Blood Glucose.: 125 mg/dL (01-08-19 @ 21:43)  POCT Blood Glucose.: 233 mg/dL (01-08-19 @ 17:40)  POCT Blood Glucose.: 139 mg/dL (01-08-19 @ 12:38)  POCT Blood Glucose.: 162 mg/dL (01-08-19 @ 09:22)  POCT Blood Glucose.: 240 mg/dL (01-07-19 @ 22:49)  POCT Blood Glucose.: 81 mg/dL (01-07-19 @ 18:30)                            10.8   11.1  )-----------( 444      ( 09 Jan 2019 16:11 )             33.0       01-09    137  |  84<L>  |  22  ----------------------------<  234<H>  3.7   |  38<H>  |  1.10    Ca    9.9      09 Jan 2019 16:11  Mg     1.7     01-09    TPro  6.9  /  Alb  4.2  /  TBili  0.4  /  DBili  x   /  AST  20  /  ALT  45  /  AlkPhos  56  01-09      Hemoglobin A1C, Whole Blood: 7.2 % <H> [4.0 - 5.6] (11-30-18 @ 07:58)            Contact number: kateryna [phone] or [phone]

## 2019-01-11 LAB
ANION GAP SERPL CALC-SCNC: 10 MMOL/L — SIGNIFICANT CHANGE UP (ref 5–17)
BASE EXCESS BLDA CALC-SCNC: 17.5 MMOL/L — HIGH (ref -2–2)
BUN SERPL-MCNC: 24 MG/DL — HIGH (ref 7–23)
CALCIUM SERPL-MCNC: 9.7 MG/DL — SIGNIFICANT CHANGE UP (ref 8.4–10.5)
CHLORIDE SERPL-SCNC: 86 MMOL/L — LOW (ref 96–108)
CO2 BLDA-SCNC: 48 MMOL/L — HIGH (ref 22–30)
CO2 SERPL-SCNC: 40 MMOL/L — HIGH (ref 22–31)
CREAT SERPL-MCNC: 1.25 MG/DL — SIGNIFICANT CHANGE UP (ref 0.5–1.3)
GAS PNL BLDA: SIGNIFICANT CHANGE UP
GLUCOSE BLDC GLUCOMTR-MCNC: 133 MG/DL — HIGH (ref 70–99)
GLUCOSE BLDC GLUCOMTR-MCNC: 178 MG/DL — HIGH (ref 70–99)
GLUCOSE BLDC GLUCOMTR-MCNC: 180 MG/DL — HIGH (ref 70–99)
GLUCOSE BLDC GLUCOMTR-MCNC: 199 MG/DL — HIGH (ref 70–99)
GLUCOSE SERPL-MCNC: 277 MG/DL — HIGH (ref 70–99)
HCO3 BLDA-SCNC: 45 MMOL/L — HIGH (ref 21–29)
PCO2 BLDA: 79 MMHG — CRITICAL HIGH (ref 32–46)
PH BLDA: 7.38 — SIGNIFICANT CHANGE UP (ref 7.35–7.45)
PO2 BLDA: 117 MMHG — HIGH (ref 74–108)
POTASSIUM SERPL-MCNC: 3.6 MMOL/L — SIGNIFICANT CHANGE UP (ref 3.5–5.3)
POTASSIUM SERPL-SCNC: 3.6 MMOL/L — SIGNIFICANT CHANGE UP (ref 3.5–5.3)
SAO2 % BLDA: 98 % — HIGH (ref 92–96)
SODIUM SERPL-SCNC: 136 MMOL/L — SIGNIFICANT CHANGE UP (ref 135–145)

## 2019-01-11 PROCEDURE — 99232 SBSQ HOSP IP/OBS MODERATE 35: CPT

## 2019-01-11 RX ORDER — BACITRACIN ZINC 500 UNIT/G
1 OINTMENT IN PACKET (EA) TOPICAL DAILY
Qty: 0 | Refills: 0 | Status: DISCONTINUED | OUTPATIENT
Start: 2019-01-11 | End: 2019-01-15

## 2019-01-11 RX ORDER — INSULIN GLARGINE 100 [IU]/ML
10 INJECTION, SOLUTION SUBCUTANEOUS AT BEDTIME
Qty: 0 | Refills: 0 | Status: DISCONTINUED | OUTPATIENT
Start: 2019-01-11 | End: 2019-01-14

## 2019-01-11 RX ORDER — HYDROCORTISONE 1 %
1 OINTMENT (GRAM) TOPICAL DAILY
Qty: 0 | Refills: 0 | Status: DISCONTINUED | OUTPATIENT
Start: 2019-01-11 | End: 2019-01-15

## 2019-01-11 RX ADMIN — Medication 1 TABLET(S): at 12:37

## 2019-01-11 RX ADMIN — Medication 1: at 13:57

## 2019-01-11 RX ADMIN — LEVALBUTEROL 0.63 MILLIGRAM(S): 1.25 SOLUTION, CONCENTRATE RESPIRATORY (INHALATION) at 06:33

## 2019-01-11 RX ADMIN — Medication 4 UNIT(S): at 10:53

## 2019-01-11 RX ADMIN — Medication 500 MICROGRAM(S): at 00:28

## 2019-01-11 RX ADMIN — Medication 4 UNIT(S): at 13:57

## 2019-01-11 RX ADMIN — Medication 500 MICROGRAM(S): at 06:46

## 2019-01-11 RX ADMIN — Medication 100 MILLIGRAM(S): at 12:37

## 2019-01-11 RX ADMIN — Medication 0.5 MILLIGRAM(S): at 17:42

## 2019-01-11 RX ADMIN — LEVALBUTEROL 0.63 MILLIGRAM(S): 1.25 SOLUTION, CONCENTRATE RESPIRATORY (INHALATION) at 00:14

## 2019-01-11 RX ADMIN — Medication 3 UNIT(S): at 17:43

## 2019-01-11 RX ADMIN — Medication 1 APPLICATION(S): at 13:58

## 2019-01-11 RX ADMIN — Medication 0.5 MILLIGRAM(S): at 06:33

## 2019-01-11 RX ADMIN — Medication 400 MILLIGRAM(S): at 12:37

## 2019-01-11 RX ADMIN — LEVALBUTEROL 0.63 MILLIGRAM(S): 1.25 SOLUTION, CONCENTRATE RESPIRATORY (INHALATION) at 12:36

## 2019-01-11 RX ADMIN — Medication 10 MILLIGRAM(S): at 06:32

## 2019-01-11 RX ADMIN — Medication 2000 UNIT(S): at 12:37

## 2019-01-11 RX ADMIN — INSULIN GLARGINE 10 UNIT(S): 100 INJECTION, SOLUTION SUBCUTANEOUS at 22:42

## 2019-01-11 RX ADMIN — Medication 240 MILLIGRAM(S): at 06:33

## 2019-01-11 RX ADMIN — Medication 500 MICROGRAM(S): at 12:37

## 2019-01-11 RX ADMIN — Medication 500 MICROGRAM(S): at 17:42

## 2019-01-11 RX ADMIN — SENNA PLUS 2 TABLET(S): 8.6 TABLET ORAL at 22:48

## 2019-01-11 RX ADMIN — APIXABAN 5 MILLIGRAM(S): 2.5 TABLET, FILM COATED ORAL at 17:42

## 2019-01-11 RX ADMIN — Medication 81 MILLIGRAM(S): at 12:37

## 2019-01-11 RX ADMIN — Medication 40 MILLIGRAM(S): at 06:33

## 2019-01-11 RX ADMIN — Medication 1: at 10:53

## 2019-01-11 RX ADMIN — POLYETHYLENE GLYCOL 3350 17 GRAM(S): 17 POWDER, FOR SOLUTION ORAL at 12:37

## 2019-01-11 RX ADMIN — LEVALBUTEROL 0.63 MILLIGRAM(S): 1.25 SOLUTION, CONCENTRATE RESPIRATORY (INHALATION) at 17:43

## 2019-01-11 RX ADMIN — MONTELUKAST 10 MILLIGRAM(S): 4 TABLET, CHEWABLE ORAL at 12:37

## 2019-01-11 RX ADMIN — Medication 1 MILLIGRAM(S): at 22:48

## 2019-01-11 RX ADMIN — Medication 100 MILLIGRAM(S): at 06:32

## 2019-01-11 RX ADMIN — PANTOPRAZOLE SODIUM 40 MILLIGRAM(S): 20 TABLET, DELAYED RELEASE ORAL at 06:32

## 2019-01-11 RX ADMIN — APIXABAN 5 MILLIGRAM(S): 2.5 TABLET, FILM COATED ORAL at 06:32

## 2019-01-11 RX ADMIN — SIMVASTATIN 10 MILLIGRAM(S): 20 TABLET, FILM COATED ORAL at 22:48

## 2019-01-11 RX ADMIN — ISOSORBIDE MONONITRATE 30 MILLIGRAM(S): 60 TABLET, EXTENDED RELEASE ORAL at 12:37

## 2019-01-11 RX ADMIN — Medication 100 MILLIGRAM(S): at 22:48

## 2019-01-11 NOTE — PROGRESS NOTE ADULT - PROBLEM SELECTOR PLAN 5
A1C 7.2, controlled  - d/w endo  - c/w Lantus 9 units QHS  - c/w Humalog  4-4-3 w/meals   - Continue to monitor blood sugars.

## 2019-01-11 NOTE — PROVIDER CONTACT NOTE (OTHER) - ASSESSMENT
Pt a&oX4; vss; pt with FS 38, repeat 42; 105 after treatment; pt ate snack and apple juice given; pt ordered for Lantus 16units
Asymptomatic, hr sustained. BP w/in limits.
pt a &ox4, VSS. [facility] Finger Stick 439
pt alert and oriented times three
BP 96/66
Pt A&Ox4, VSS this AM. No s/s of hyper/hypoglycemia at this time. Pt states "don't wake me up, I don't need my sugar checked" RN educated patient on necessity of checking FS. Pt with verbalized understanding and still refusing at this time.
pt A&Ox4, VSS, OOB ad judie. pt denies SOB, CP, pain.
pt A&Ox4, VSS, OOB ad judie. pt denies SOB, CP, pain. O2 sat 100% on 4L NC, pt c/o NIELSON baseline. pt received 60mg cardizem PO and 10mg cardizem IVP @ 1800
pt a&ox4 denies pain. pt states she hasn't been eating much today. pt states she feels nauseous. IV site wdl & flushes without difficulty. pt complains of right eye redness.
pt a&ox4, VSS, denies any abdominal pain, no abdominal distension noted, denies any nausea.  no vomiting noted. pt refuses to use enemas or suppository.
pt asymptomatic. bp stable.
pt c/o nausea, jitteriness and dizziness
pt stated she feels nausea, please see provider contact note from yesterday
vss, as per pt fs completed fs 198
Became hypoglycemic prior to dinner because of poor PO intake.

## 2019-01-11 NOTE — PROGRESS NOTE ADULT - ASSESSMENT
59 y/o F w/h/o controlled T2DM on Metformin 500mg bid. Also h/o CAD and COPD now on prednisone 10 mg daily. Tolerating POs with variable BG levels depending on PO intake. FBG continue to be elevated so will increase Lantus dose  to 10 units, Also noted Prednisone dose still scheduled for 6am instead of 8am as recommended.. BG goal (100-180mg/dl).

## 2019-01-11 NOTE — PROVIDER CONTACT NOTE (CRITICAL VALUE NOTIFICATION) - ASSESSMENT
Pt allowed Respiratory therapist to place her on BIPAP this AM.  Mask fitted by MODE and she tolerated well.
Pt on humidified oxygen, not anxious at this time, O2 sat = 99% on supplemental oxygen.
cc of SOB, NIELSON, lung sounds wheezing bilaterally. Overnight patient on and off with bipap unable to tolerate, stating " this is uncomfortable" encouraged utilizing machine, O2 therapy via nc at 4L. VS- see flowsheet.
had bm yesterday, soft. denies chest pain or palpitations
pt AXOX4 , resting comfortably , bipap just taken off
pt a&ox4, able to communicate in full sentences without become dyspneic
pt on bipap for 1 hour prior to blood draw

## 2019-01-11 NOTE — PROGRESS NOTE ADULT - NSHPATTENDINGPLANDISCUSS_GEN_ALL_CORE
MEDICINE AMINAH Frausto
Covering NP
med team, pulmonary consultant
Med attending
med attending
Covering NP
med attending
med attending
Pulmonary, Endo, Card-EP
MEDICINE DAVIDA SAAVEDRA
medicine DAVIDA Coelho
medicine NP Rosalba
Covering NP

## 2019-01-11 NOTE — PROVIDER CONTACT NOTE (CRITICAL VALUE NOTIFICATION) - RECOMMENDATIONS
Continue on BIPAP.
As per PA, place pt on BIPAP. Pt states she does not want BIPAP stating that it keeps her awake.  PA notified.
BiPap settings to be readjusted?
PA made aware,
as per NP obtain ekg, bmp
continue to monitor
proivder to see patient.

## 2019-01-11 NOTE — PROGRESS NOTE ADULT - PROBLEM SELECTOR PLAN 3
b/l LE chronic for past 3+weeks, likely related to IVF during this admission and dCHF  mild improvement   - keep b/l legs elevated  - podiatry recs appreciated  - c/w po lasix as above  - calamine lotion for pain controlling pain well, hydrocortisone to left foot, r foot w/ bacitracin

## 2019-01-11 NOTE — PROVIDER CONTACT NOTE (CRITICAL VALUE NOTIFICATION) - SITUATION
PCO2 from 0600 stat lab = 71.
ABG = PCO2 of 71
Pco2 71
Pco2 72, Arterial blood glucose 436
critical PCO2 79
potassium level 6.7, no hemolyzed
pt with aBG ordered , Dr Fair

## 2019-01-11 NOTE — PROGRESS NOTE ADULT - PROBLEM SELECTOR PLAN 1
-test BG AC/HS  -Increase Lantus 10 units QHS  -c/w Humalog 4-4-3 w/meals  -c/w Humalog low correction scale AC and Low HS scale  -Change Prednisone dose timing to 8am as has been recommended in the past.  discharge plan: restart Metformin if off steroids  -Plan discussed with pt/team/staff. If discharge to rehab on steroid will continue basal/bolus therapy. Doses TBD  Contact info: [phone] (24/7). pager [phone]

## 2019-01-11 NOTE — PROVIDER CONTACT NOTE (OTHER) - NAME OF MD/NP/PA/DO NOTIFIED:
AMINAH Apple
AMINAH Aquino
AMINAH Hernandez
AMINAH Medrano
AMINAH Whitehead
Brittany ALEXANDER, NP
Charley Murillo
DAVIDA Cueva
Dr. Crandall/Dr. Fair
Gustavo Townsend NP
NP Gustavo Townsend
NP N. Pullido & Endocronologist Lashay Del Toro
NP Rosalba
Nafisa Hernandez NP
Nathaly BURR
Nathaly BURR
NP Wayne Bonds; Duncan GARRISON

## 2019-01-11 NOTE — PROGRESS NOTE ADULT - ASSESSMENT
ASSESSMENT:    atrial arrythmias with RVR in the setting of severe lung disease, long-term beta-agonist use, elevated sympathetic tone due to critical illness and anxiety - s/p successful DCCV    ongoing but improving dyspnea with exertion with chronic hypoxic and hypercapnic respiratory failure due to very severe COPD with progression of disease - adequate oxygenation on a nasal canula in the setting of profound dyspnea suggests that alterations in the mechanics of breathing due to lung hyperinflation and obesity are likely playing a significant role in her shortness of breath - anxiety is also playing a role - there is evidence of CAD without pulmonary hypertension on the CT scan which is without pulmonary emboli or pneumonia - cardiac catheterization last year revealed patent stents - ECHO has a preserved LVEF, no significant valvular heart disease and no pulmonary hypertension - resolved AURORA, hyperkalemia and hyponatremia - resolved respiratory acidosis on repeat ABG - lower extremity weakness likely due to steroid induced myopathy perhaps exacerbated by statins much improved    extremity edema/discomfort likely related to steroid induced fluid retention - no evidence of DVT on repeat upper or lower extremity Duplex examination - resolved upper extremity edema - improving lower extremity edema with bedrest - feet now with decreased swelling and erythema with bullae and pain due to small lacerations    HTN/HLD/DM    CAD s/p PCI x 6    PAD    PLAN/RECOMMENDATIONS:    BIPAP 12/5 with 40% FiO2 for sleep - on during the day only as a last resort for increased work of breathing or recurrent respiratory acidosis - she has weaned off daytime BIPAP in anticipation of transfer to an acute rehabilitation center  oxygen supplementation to keep saturation greater than 92% using a nasal canula when off BIPAP  following ABG  sputum culture - no growth x2 - has completed a course of biaxin  home trilogy vent has been arranged with [facility]   xopenex/atrovent nebs q6h   pulmicort 0.5mg nebs q12h  singulair/theophylline - theophylline level is subtherapeutic - daliresp discontinued  prednisone 10mg daily - decrease by 5mg every 5 days - glucose control - watch for thrush off nystatin  azithromycin TIW for antiinflammatory effects  cardiology and EP service recommendations noted - off amiodarone and beta blockers  cardiac meds: ASA/eliquis/imdur/lasix/diltiazem CD/zocor (despite a possible statin related myopathy) - off norvasc due to swelling - BP control adequate  diurese as tolerated by renal function and hemodynamics - watch for worsening metabolic alkalosis with loop diuretic use which could lead to worsening hypercapnia  GI/DVT prophylaxis - protoix/eliquis  physical therapy daily - wound care  leg elevation  podiatry evaluation noted  transfer to acute rehab in the near future  outpatient evaluation for lung transplantation    Will follow with you. Plan of care discussed with the patient at bedside, the dedicated floor NP and the . I have reached out to the [facility] transplant team. They would like the patient reevaluated for possible transfer to [facility] acute rehab when stable where they will be able to evaluate the patient for possible lung transplantation and perform appropriate testing as needed.    Please call over the weekend with questions or clinical changes.    David Fair MD  Pulmonary Medicine  [phone]

## 2019-01-11 NOTE — PROGRESS NOTE ADULT - PROBLEM SELECTOR PLAN 2
Completed 7 days of biaxin.   - d/w pulm Dr Fair - he has spoken w/ [facility]  - Home Trilogy vent arranged by pulmonary  - c/w bipap at night. Patient has NOT needed BIPAP during the day any longer.  - Plan for discharge to acute pulmonary rehab  - c/w Prednisone taper per pulm, Pulmicort & Duoneb nebs  - c/w Theophylline, Singulair

## 2019-01-11 NOTE — PROVIDER CONTACT NOTE (CRITICAL VALUE NOTIFICATION) - BACKGROUND
57 yo female with pulmonary emphysema, COPD, DM.
57 y/o female admitted for copd exacerbation and pulmonary emphysema
57 yo female with pulmonary emphysema
59 y/o female admitted for pulmonary emphysema copd exacerbation
Copd exac, hypoxic resp failure , AURORA, SVT- amio load
pt admitted 11/29 with emphysema
pt had false elevation previously
5

## 2019-01-11 NOTE — PROGRESS NOTE ADULT - ASSESSMENT
59 yo F PMH COPD, chronic hypoxic resp failure on home O2 3L, HTN, HLD, CAD s/p 6 stents, dCHF, T2DM, PVD, p/w progressive worsening dyspnea x 2 weeks a/w COPD exacerbation, acute on chronic hypoxic/hypercarbic respiratory failure. With hosp course c/b new onset afib RVR s/p successful CV, and b/l feet edema.

## 2019-01-11 NOTE — PROVIDER CONTACT NOTE (OTHER) - BACKGROUND
Pt admit with pulmonary emphysema; hx COPD; HTN; HLD; DM2; AURORA
Pt on cardizem iv drip @15mg/hr. Digoxin given as ordered.
pt admitted with COPD exacerbation, pt with history of DM type 2
58 y.o female admitted for pulmonary emphysema copd exacerbation
Offered bowel regime of miralax, colace & senna but patient declined. Stated will try again later & would take senna "if nothing happens today".
On Isorbid
Pt admitted for pulmonary emphysema
Pt admitted to picu for RAPID AFIB. hx of severe copd.
admit dx pulmonary emphysema
dx: hypoxia 2/2 COPD  NOS Afib RVR since 1200 1/5
dx: hypoxia 2/2 COPD  NOS afib RVR
pt received 8 units of premeal insulin for fs of 128, orders reviewed via sunrise with NP
pt was given 10 units insulin for fs 198 this morning, had full breakfast
pt was titrated on insulin premeal and lantus, had hypoglycemia
Lunch time FS was 251 and premeal insulin of 12units given.  Pt ate only soup & went for doppler test off floor.

## 2019-01-11 NOTE — PROGRESS NOTE ADULT - SUBJECTIVE AND OBJECTIVE BOX
CARDIOLOGY FOLLOW UP - Dr. Brunson    CC no cp/sob   with PT       PHYSICAL EXAM:  T(C): 36.6 (01-11-19 @ 04:51), Max: 36.8 (01-10-19 @ 20:40)  HR: 80 (01-11-19 @ 09:34) (75 - 87)  BP: 141/70 (01-11-19 @ 04:51) (141/70 - 162/76)  RR: 20 (01-11-19 @ 04:51) (20 - 20)  SpO2: 99% (01-11-19 @ 04:51) (99% - 100%)  Wt(kg): --  I&O's Summary    10 Aroldo 2019 07:01  -  11 Jan 2019 07:00  --------------------------------------------------------  IN: 480 mL / OUT: 400 mL / NET: 80 mL    11 Jan 2019 07:01  -  11 Jan 2019 11:28  --------------------------------------------------------  IN: 240 mL / OUT: 0 mL / NET: 240 mL        Appearance: Normal	  Cardiovascular: Normal S1 S2,RRR, No JVD, No murmurs  Respiratory: diminished   Gastrointestinal:  Soft, Non-tender, + BS	  Extremities: Normal range of motion, No clubbing, b.l le edema, Left foot redness       MEDICATIONS  (STANDING):  apixaban 5 milliGRAM(s) Oral every 12 hours  artificial tears (preservative free) Ophthalmic Solution 1 Drop(s) Both EYES three times a day  aspirin enteric coated 81 milliGRAM(s) Oral daily  azithromycin   Tablet 250 milliGRAM(s) Oral <User Schedule>  Biotene Dry Mouth Oral Rinse 5 milliLiter(s) Swish and Spit two times a day  bisacodyl Suppository 10 milliGRAM(s) Rectal once  buDESOnide   0.5 milliGRAM(s) Respule 0.5 milliGRAM(s) Inhalation every 12 hours  calamine Lotion 1 Application(s) Topical two times a day  cholecalciferol 2000 Unit(s) Oral daily  dextrose 5%. 1000 milliLiter(s) (50 mL/Hr) IV Continuous <Continuous>  dextrose 50% Injectable 12.5 Gram(s) IV Push once  dextrose 50% Injectable 25 Gram(s) IV Push once  dextrose 50% Injectable 25 Gram(s) IV Push once  diltiazem    milliGRAM(s) Oral daily  docusate sodium 100 milliGRAM(s) Oral three times a day  furosemide    Tablet 40 milliGRAM(s) Oral daily  insulin glargine Injectable (LANTUS) 9 Unit(s) SubCutaneous at bedtime  insulin lispro (HumaLOG) corrective regimen sliding scale   SubCutaneous three times a day before meals  insulin lispro (HumaLOG) corrective regimen sliding scale   SubCutaneous at bedtime  insulin lispro Injectable (HumaLOG) 4 Unit(s) SubCutaneous before breakfast  insulin lispro Injectable (HumaLOG) 3 Unit(s) SubCutaneous with dinner  insulin lispro Injectable (HumaLOG) 4 Unit(s) SubCutaneous before lunch  ipratropium    for Nebulization 500 MICROGram(s) Nebulizer every 6 hours  isosorbide   mononitrate ER Tablet (IMDUR) 30 milliGRAM(s) Oral daily  levalbuterol Inhalation 0.63 milliGRAM(s) Inhalation every 6 hours  melatonin 1 milliGRAM(s) Oral at bedtime  montelukast 10 milliGRAM(s) Oral daily  multivitamin 1 Tablet(s) Oral daily  mupirocin 2% Ointment 1 Application(s) Topical <User Schedule>  pantoprazole    Tablet 40 milliGRAM(s) Oral before breakfast  polyethylene glycol 3350 17 Gram(s) Oral daily  predniSONE   Tablet   Oral   predniSONE   Tablet 10 milliGRAM(s) Oral daily  senna 2 Tablet(s) Oral at bedtime  simethicone 80 milliGRAM(s) Chew once  simvastatin 10 milliGRAM(s) Oral at bedtime  theophylline ER Capsule 400 milliGRAM(s) Oral daily      TELEMETRY: NSR 	    ECG:  	  RADIOLOGY:   DIAGNOSTIC TESTING:  [ ] Echocardiogram:  [ ]  Catheterization:  [ ] Stress Test:    OTHER: 	    LABS:	 	                                10.8   11.1  )-----------( 444      ( 09 Jan 2019 16:11 )             33.0     01-11    136  |  86<L>  |  24<H>  ----------------------------<  277<H>  3.6   |  40<H>  |  1.25    Ca    9.7      11 Jan 2019 06:46  Mg     1.7     01-09    TPro  6.9  /  Alb  4.2  /  TBili  0.4  /  DBili  x   /  AST  20  /  ALT  45  /  AlkPhos  56  01-09

## 2019-01-11 NOTE — PROVIDER CONTACT NOTE (OTHER) - ACTION/TREATMENT ORDERED:
NP aware. No further interventions at this time. Safety maintained. Will continue to monitor patient.

## 2019-01-11 NOTE — PROVIDER CONTACT NOTE (CRITICAL VALUE NOTIFICATION) - ACTION/TREATMENT ORDERED:
Continue on BIPAP.
PA made aware, encourage utilizing bipap machine. Will continue to monitor status and maintain safety.
Spoke with pt who will take BIPAP in the morning.  Respiratory therapist aware and will place pt on BIPAP in AM.
as per provider settings to be be re adjusted
awaiting orders from NP
capillary blood gluocse 425 repeat capillary 426, provider in with patient
labs sent, ekg normal

## 2019-01-11 NOTE — PROGRESS NOTE ADULT - SUBJECTIVE AND OBJECTIVE BOX
Diabetes Follow up note: Saw pt earlier today  Interval Hx: 59 y/o F w/h/o controlled T2DM on Metformin 500mg bid. Also h/o CAD and COPD. Here with COPD exacerbation>on prednisone taper 10mg til 1/13 then to decrease to 5mg til 1/19. Pt reports tolerating POs and been careful about slaty food due to LE edema. BG levels variablle between 100s to 200s depending on PO intake. Noted FBG goig up even after Lantus increased yesterday. Pt denies eating at night. reports no one is bringing food from home anymore.     Review of Systems:  General: No complaints verbalized today. Upet because someone told her she was leaving to resp rehab today and she still has problems with her LE edema.   GI: Tolerating POs without any N/V/D/ABD PAIN.  CV: No CP/SOB  ENDO: No S&Sx of hypoglycemia      MEDS:  insulin glargine Injectable (LANTUS) 9 Unit(s) SubCutaneous at bedtime  insulin lispro (HumaLOG) corrective regimen sliding scale   SubCutaneous three times a day before meals  insulin lispro (HumaLOG) corrective regimen sliding scale   SubCutaneous at bedtime  insulin lispro Injectable (HumaLOG) 4 Unit(s) SubCutaneous before breakfast  insulin lispro Injectable (HumaLOG) 3 Unit(s) SubCutaneous with dinner  insulin lispro Injectable (HumaLOG) 4 Unit(s) SubCutaneous before lunch  predniSONE   Tablet   Oral   predniSONE   Tablet 10 milliGRAM(s) Oral daily  simvastatin 10 milliGRAM(s) Oral at bedtime  azithromycin   Tablet 250 milliGRAM(s) Oral <User Schedule>  BACItracin   Ointment 1 Application(s) Topical daily  cholecalciferol 2000 Unit(s) Oral daily  hydrocortisone 1% Cream 1 Application(s) Topical daily  theophylline ER Capsule 400 milliGRAM(s) Oral daily    Allergies    No Known Allergies    Intolerances    albuterol (Unknown)    PE:  General: Female lying in bed in NAD.   Vital Signs Last 24 Hrs  T(C): 36.6 (01-11-19 @ 12:07), Max: 36.8 (01-10-19 @ 20:40)  T(F): 97.9 (01-11-19 @ 12:07), Max: 98.3 (01-10-19 @ 20:40)  HR: 78 (01-11-19 @ 12:07) (78 - 87)  BP: 160/70 (01-11-19 @ 12:07) (141/70 - 160/70)  BP(mean): --  RR: 20 (01-11-19 @ 12:07) (20 - 20)  SpO2: 100% (01-11-19 @ 12:07) (99% - 100%)  Abd: Soft, NT, ND, Obese.   Extremities: Warm. B/L LE edema w/foot dsgs intact.   Neuro: A&O X3    LABS:  POCT Blood Glucose.: 133 mg/dL (01-11-19 @ 16:46)  POCT Blood Glucose.: 180 mg/dL (01-11-19 @ 13:28)  POCT Blood Glucose.: 199 mg/dL (01-11-19 @ 10:05)  POCT Blood Glucose.: 201 mg/dL (01-10-19 @ 21:54)  POCT Blood Glucose.: 174 mg/dL (01-10-19 @ 20:42)  POCT Blood Glucose.: 149 mg/dL (01-10-19 @ 16:56)  POCT Blood Glucose.: 255 mg/dL (01-10-19 @ 13:11)  POCT Blood Glucose.: 218 mg/dL (01-10-19 @ 10:14)  POCT Blood Glucose.: 198 mg/dL (01-09-19 @ 22:18)  POCT Blood Glucose.: 125 mg/dL (01-09-19 @ 17:05)      01-11    136  |  86<L>  |  24<H>  ----------------------------<  277<H>  3.6   |  40<H>  |  1.25    Ca    9.7      11 Jan 2019 06:46    Hemoglobin A1C, Whole Blood: 7.2 % <H> [4.0 - 5.6] (11-30-18 @ 07:58)

## 2019-01-11 NOTE — PROGRESS NOTE ADULT - SUBJECTIVE AND OBJECTIVE BOX
[facility] - [facility]     PROGRESS NOTE    CHIEF COMPLAINT: chronic hypoxic/hypercapnic respiratory failure; COPD exacerbation; emphysema; dyspnea; obesity; atrial fibrillation with RVR (resolved)    INTERVAL HISTORY: much less foot swelling with decreased pain on the top of the feet due to tiny lacerations which have been dressed; s/p successful DCCV - remains in NSR - off amiodarone and beta-blockers; slept poorly in bed with BIPAP due to anxiety related to difficulty getting into an acute rehab facility; off BIPAP during the without shortness of breath on a nasal canula @ 4lpm; ABG with improved hypercapnia without acidemia; arm swelling has resolved on diuretics and reduced dose of steroids; resolved AURORA, hyperkalemia and hyponatremia; no cough, sputum production, chest congestion or wheeze; no fevers, chills or sweats; no chest pain/pressure or palpitations; decreased lower extremity swelling with leg elevation; improved leg strength now easily standing up from a chair or commode;     REVIEW OF SYSTEMS:  Constitutional: As per interval history  HEENT: Within normal limits  CV: As per interval history  Resp: As per interval history  GI: Within normal limits   : Within normal limits  Musculoskeletal: improving lower extremity weakness   Skin: Within normal limits  Neurological: Within normal limits  Psychiatric: frustrated   Endocrine: Within normal limits  Hematologic/Lymphatic: Within normal limits  Allergic/Immunologic: Within normal limits    MEDICATIONS:     Pulmonary "  buDESOnide   0.5 milliGRAM(s) Respule 0.5 milliGRAM(s) Inhalation every 12 hours  ipratropium    for Nebulization 500 MICROGram(s) Nebulizer every 6 hours  levalbuterol Inhalation 0.63 milliGRAM(s) Inhalation every 6 hours  montelukast 10 milliGRAM(s) Oral daily  theophylline ER Capsule 400 milliGRAM(s) Oral daily      Anti-microbials:  azithromycin   Tablet 250 milliGRAM(s) Oral <User Schedule>      Cardiovascular:  diltiazem    milliGRAM(s) Oral daily  furosemide    Tablet 40 milliGRAM(s) Oral daily  isosorbide   mononitrate ER Tablet (IMDUR) 30 milliGRAM(s) Oral daily      Other:  aluminum hydroxide/magnesium hydroxide/simethicone Suspension 30 milliLiter(s) Oral every 6 hours PRN  apixaban 5 milliGRAM(s) Oral every 12 hours  artificial tears (preservative free) Ophthalmic Solution 1 Drop(s) Both EYES three times a day  aspirin enteric coated 81 milliGRAM(s) Oral daily  Biotene Dry Mouth Oral Rinse 5 milliLiter(s) Swish and Spit two times a day  bisacodyl Suppository 10 milliGRAM(s) Rectal once  calamine Lotion 1 Application(s) Topical two times a day  cholecalciferol 2000 Unit(s) Oral daily  dextrose 40% Gel 15 Gram(s) Oral once PRN  dextrose 5%. 1000 milliLiter(s) IV Continuous <Continuous>  dextrose 50% Injectable 12.5 Gram(s) IV Push once  dextrose 50% Injectable 25 Gram(s) IV Push once  dextrose 50% Injectable 25 Gram(s) IV Push once  docusate sodium 100 milliGRAM(s) Oral three times a day  glucagon  Injectable 1 milliGRAM(s) IntraMuscular once PRN  insulin glargine Injectable (LANTUS) 9 Unit(s) SubCutaneous at bedtime  insulin lispro (HumaLOG) corrective regimen sliding scale   SubCutaneous three times a day before meals  insulin lispro (HumaLOG) corrective regimen sliding scale   SubCutaneous at bedtime  insulin lispro Injectable (HumaLOG) 4 Unit(s) SubCutaneous before breakfast  insulin lispro Injectable (HumaLOG) 3 Unit(s) SubCutaneous with dinner  insulin lispro Injectable (HumaLOG) 4 Unit(s) SubCutaneous before lunch  melatonin 1 milliGRAM(s) Oral at bedtime  multivitamin 1 Tablet(s) Oral daily  mupirocin 2% Ointment 1 Application(s) Topical <User Schedule>  ondansetron Injectable 4 milliGRAM(s) IV Push every 6 hours PRN  pantoprazole    Tablet 40 milliGRAM(s) Oral before breakfast  petrolatum Ophthalmic Ointment 1 Application(s) Both EYES at bedtime PRN  polyethylene glycol 3350 17 Gram(s) Oral daily  predniSONE   Tablet   Oral   predniSONE   Tablet 10 milliGRAM(s) Oral daily  senna 2 Tablet(s) Oral at bedtime  simethicone 80 milliGRAM(s) Chew once  simvastatin 10 milliGRAM(s) Oral at bedtime  sodium chloride 0.65% Nasal 1 Spray(s) Both Nostrils two times a day PRN        OBJECTIVE:    I&O's Detail    10 Aroldo 2019 07:01  -  11 Jan 2019 07:00  --------------------------------------------------------  IN:    Oral Fluid: 480 mL  Total IN: 480 mL    OUT:    Voided: 400 mL  Total OUT: 400 mL    Total NET: 80 mL    POCT Blood Glucose.: 199 mg/dL (11 Jan 2019 10:05)  POCT Blood Glucose.: 201 mg/dL (10 Aroldo 2019 21:54)  POCT Blood Glucose.: 174 mg/dL (10 Aroldo 2019 20:42)  POCT Blood Glucose.: 149 mg/dL (10 Aroldo 2019 16:56)  POCT Blood Glucose.: 255 mg/dL (10 Aroldo 2019 13:11)      PHYSICAL EXAM:       ICU Vital Signs Last 24 Hrs  T(C): 36.6 (11 Jan 2019 04:51), Max: 36.8 (10 Aroldo 2019 20:40)  T(F): 97.8 (11 Jan 2019 04:51), Max: 98.3 (10 Aroldo 2019 20:40)  HR: 80 (11 Jan 2019 09:34) (75 - 87)  BP: 141/70 (11 Jan 2019 04:51) (141/70 - 162/76)  BP(mean): --  ABP: --  ABP(mean): --  RR: 20 (11 Jan 2019 04:51) (20 - 20)  SpO2: 99% (11 Jan 2019 04:51) (99% - 100%) on 4lpm nasal canula     General: Awake. Alert. Cooperative. No distress. Appears stated age. Obese.   HEENT:  Atraumatic. Normocephalic. Anicteric. Normal oral mucosa. PERRL. EOMI.   Neck: Supple. Trachea midline. Thyroid without enlargement/tenderness/nodules. No carotid bruit. No JVD.	  Cardiovascular: Regular rate and rhythm. Distant S1 S2. No murmurs, rubs or gallops.  Respiratory: Respirations unlabored. Markedly decreased breath sounds throughout. No wheeze. No curvature.  Abdomen: Soft. Non-tender. Non-distended. No organomegaly. No masses. Normal bowel sounds. Obese.  Extremities: Warm to touch. No clubbing or cyanosis. Mild bilateral lower extremity edema up to the thigh. Resolved upper extremity swelling. Bilateral foot swelling with bullae, erythema and tenderness to palpitation - bandaged.  Pulses: Decreased lower extremity peripheral pulses.  Skin: No rashes or lesions. No ecchymoses. No cyanosis. Warm to touch.   Lymph Nodes: Cervical, supraclavicular and axillary nodes normal  Neurological: Motor and sensory examination equal and normal. A and O x 3  Psychiatry: Calm.      LABS:                        10.8   11.1  )-----------( 444      ( 09 Jan 2019 16:11 )             33.0     01-11    136  |  86<L>  |  24<H>  ----------------------------<  277<H>  3.6   |  40<H>  |  1.25    01-09    137  |  84<L>  |  22  ----------------------------<  234<H>  3.7   |  38<H>  |  1.10    Ca      9.7      01-11    Ca      9.9      01-09      Mg       1.7     01-09    Mg       2.0     01-08    TPro  6.9  /  Alb  4.2  /  TBili  0.4  /  DBili  x   /  AST  20  /  ALT  45  /  AlkPhos  56  01-09    ABG - ( 11 Jan 2019 06:44 )  pH: 7.38  /  pCO2: 79    /  pO2: 117   / HCO3: 45    / Base Excess: 17.5  /  SaO2: 98        ABG - ( 06 Jan 2019 06:44 )  pH: 7.39  /  pCO2: 68    /  pO2: 99    / HCO3: 40    / Base Excess: 13.3  /  SaO2: 98        ABG - ( 03 Jan 2019 06:40 )  pH: 7.44  /  pCO2: 65    /  pO2: 152   / HCO3: 43    / Base Excess: 15.9  /  SaO2: 97        ABG - ( 01 Jan 2019 03:44 )  pH: 7.42  /  pCO2: 69    /  pO2: 165   / HCO3: 44    / Base Excess: 17.3  /  SaO2: 97        ABG - ( 31 Dec 2018 16:19 )  pH: 7.38  /  pCO2: 84    /  pO2: 26    / HCO3: 48    / Base Excess: 20.2  /  SaO2: 41        ABG - ( 18 Dec 2018 13:23 )  pH: 7.46  /  pCO2: 47    /  pO2: 88    / HCO3: 33    / Base Excess: 8.0   /  SaO2: 97        ABG - ( 16 Dec 2018 20:53 )  pH: 7.44  /  pCO2: 53    /  pO2: 173   / HCO3: 36    / Base Excess: 10.0  /  SaO2: 100       ABG - ( 13 Dec 2018 06:29 )  pH: 7.30  /  pCO2: 71    /  pO2: 182   / HCO3: 34    / Base Excess: 5.8   /  SaO2: 99        ABG - ( 13 Dec 2018 00:59 )  pH: 7.32  /  pCO2: 71    /  pO2: 75    / HCO3: 35    / Base Excess: 7.1   /  SaO2: 95        ABG - ( 11 Dec 2018 11:45 )  pH: 7.39  /  pCO2: 54    /  pO2: 98    / HCO3: 32    / Base Excess: 6.2   /  SaO2: 98        ABG - ( 09 Dec 2018 11:26 )  pH: 7.35  /  pCO2: 63    /  pO2: 145   / HCO3: 34    / Base Excess: 6.6   /  SaO2: 99      ABG - ( 09 Dec 2018 00:28 )  pH: 7.32  /  pCO2: 71    /  pO2: 124   / HCO3: 36    / Base Excess: 7.6   /  SaO2: 99        ABG - ( 08 Dec 2018 20:50 )  pH: 7.32  /  pCO2: 72    /  pO2: 80    / HCO3: 36    / Base Excess: 7.7   /  SaO2: 95        Serum Pro-Brain Natriuretic Peptide: 254 pg/mL (12-13 @ 14:00)    < from: Transthoracic Echocardiogram (12.07.18 @ 08:59) >    Patient name: GUY HARTMAN  YOB: 1960   Age: 58 (F)   MR#: [id number]  Study Date: 12/7/2018  Location: [address] [place], [place] [id number]: [facility] Armando [facility]  Study quality: Technically good  Referring Physician: Allen ingram MD  BloodPressure: 120/80 mmHg  Height: 163 cm  Weight: 88 kg  BSA: 1.9 m2  ------------------------------------------------------------------------  PROCEDURE: Transthoracic echocardiogram with 2-D, M-Mode  and complete spectral and color flow Doppler.  INDICATION: Other forms of dyspnea (R06.09)  ------------------------------------------------------------------------  Dimensions:    Normal Values:  LA:     3.6    2.0 - 4.0 cm  Ao:     2.9    2.0 - 3.8 cm  SEPTUM: 0.9    0.6 - 1.2 cm  PWT:    0.8    0.6- 1.1 cm  LVIDd:  4.9    3.0 - 5.6 cm  LVIDs:  2.9    1.8 - 4.0 cm  Derived variables:  LVMI: 73 g/m2  RWT: 0.32  Fractional short: 40 %  EF (Teicholtz): 71 %  Doppler Peak Velocity (m/sec): AoV=1.2  ------------------------------------------------------------------------  Observations:  Mitral Valve: Normal mitral valve.  Aortic Valve/Aorta: Normal trileaflet aortic valve. Peak  transaortic valve gradient equals 6 mm Hg, mean transaortic  valve gradient equals 3 mm Hg, aortic valve velocity time  integral equals 22 cm. Trace aortic regurgitation.  Aortic Root: 2.9 cm.  Left Atrium: Normal left atrium.  LA volume index = 18  cc/m2.  Left Ventricle: Normal left ventricular systolic function.  No segmental wall motion abnormalities. Normal left  ventricular internal dimensions and wall thicknesses. Mild  diastolic dysfunction (Stage I).  Right Heart: Normal right atrium. Normal right ventricular  size and function. Normal tricuspid valve. Minimal  tricuspid regurgitation. Normal pulmonic valve.  Pericardium/Pleura: Normal pericardium with no pericardial  effusion.  Hemodynamic: Estimated right atrial pressure is 8 mm Hg.  Inadequate tricuspid regurgitation Doppler envelope  precludes estimation of RVSP.  ------------------------------------------------------------------------  Conclusions:  1. Normal mitral valve.  2. Normal trileaflet aortic valve. Peak transaortic valve  gradient equals 6 mm Hg, mean transaortic valve gradient  equals 3 mm Hg, aortic valve velocity time integral equals  22 cm. Trace aortic regurgitation.  3. Aortic Root: 2.9 cm.  4. Normal left atrium.  LA volume index = 18 cc/m2.  5. Normal left ventricular internal dimensions and wall  thicknesses.  6. Normal left ventricular systolic function. No segmental  wall motion abnormalities.  7. Mild diastolic dysfunction (Stage I).  8. Normal right ventricular size and function.  9. Inadequate tricuspid regurgitation Doppler envelope  precludes estimation of RVSP.  10. Normal tricuspid valve. Minimal tricuspid  regurgitation.  *** Compared with echocardiogram report of 2/18/2014, no  significant changes noted.  ------------------------------------------------------------------------  Confirmed on  12/7/2018 - 13:49:43 by Shefali Gómez M.D.  ------------------------------------------------------------------------    < end of copied text >  ------------------------------------------------------------------------------------------------  MICROBIOLOGY:     Culture - Sputum . (12.07.18 @ 08:34)    Gram Stain:   Rare polymorphonuclear leukocytes per low power field  Rare Squamous epithelial cells per low power field  Numerous Gram Positive Cocci in Pairs and Chains per oil power field    Specimen Source: .Sputum Sputum    Culture Results:   Normal Respiratory Samaria present    Culture - Sputum . (12.05.18 @ 22:50)    Gram Stain:   Moderate polymorphonuclear leukocytes per low power field  Few Squamous epithelial cells per low power field  Moderate Gram Positive Cocci in Pairs and Chains per oil power field    Specimen Source: .Sputum Sputum    Culture Results:   Normal Respiratory Samaria present    Rapid Respiratory Viral Panel (11.30.18 @ 01:30)    Rapid RVP Result: [facility]: [email or web address] RVP Rapid uses polymerase chain reaction (PCR) and melt  curve analysis to screen for adenovirus; coronavirus HKU1, NL63, 229E,  OC43; human metapneumovirus (hMPV); human enterovirus/rhinovirus  (Entero/RV); influenza A; influenza A/H1;influenza A/H3; influenza  A/H1-2009; influenza B; parainfluenza viruses 1, 2, 3, 4; respiratory  syncytial virus; Bordetella pertussis; Mycoplasma pneumoniae; and  Chlamydophila pneumoniae.    RADIOLOGY:  [x] Chest radiographs reviewed and interpreted by me    < from: Xray Chest 1 View- PORTABLE-Urgent (01.05.19 @ 17:56) >    EXAM:  XR CHEST PORTABLE URGENT 1V                          PROCEDURE DATE:  01/05/2019      INTERPRETATION:  CLINICAL INFORMATION: Shortness of breath.    EXAM: Frontal radiograph of the chest.    COMPARISON: Chest radiograph from 12/25/2018    FINDINGS:  Heart size is normal.    The lungs are clear. No pneumothorax or pleural effusions.    Visualized osseous structures are unremarkable.    IMPRESSION: Clear lungs.    ADAM VAUGHN M.D., RADIOLOGY RESIDENT  This document has been electronically signed.  JEYSON SANCHEZ M.D., ATTENDING RADIOLOGIST  This document has been electronically signed. Jan 7 2019  3:28PM      < end of copied text >  ---------------------------------------------------------------------------------------------------------------    < from: [facility] Duplex Lower Ext Vein Scan, Bilat (12.30.18 @ 19:06) >    EXAM:  DUPLEX SCAN EXT VEINS LOWER BI                          PROCEDURE DATE:  12/30/2018      INTERPRETATION:  Clinical indication: Worsening edema.    Technique:  Grayscale, color Doppler and spectral Doppler ultrasound was   utilized toevaluate bilateral lower extremity deep venous system.      Comparison: 12/6/2018.    Findings: There is no thrombosis in bilateral common femoral veins,   femoral veins or popliteal veins. Visualized calf veins are patent. There   is bilateral lowerextremity soft tissue edema.    Impression:     No evidence of deep vein thrombosis in either lower extremity.    BROCK TOBIN M.D., ATTENDING RADIOLOGIST  This document has been electronically signed. Dec 30 2018  7:24PM     < end of copied text >  ---------------------------------------------------------------------------------------------------------------  < from: [facility] Duplex Upper Ext Vein Scan, Bilat (12.30.18 @ 19:05) >    EXAM:  DUPLEX SCAN EXT VEINS UPPER BI                          PROCEDURE DATE:  12/30/2018      INTERPRETATION:  Clinical information: Worsening bilateral upper   extremity edema.    Findings: Duplex evaluation of the deep venous system of the right and   left upper extremity was performed to include the brachiocephalic,   internal jugular, subclavian, axillary, brachial, radial and ulnar veins.   No echogenic thrombus is seen within the vein lumina. Complete coaptation   of those segments of vein accessible to compression was achieved.   Spontaneous venous flow with respiratory and cardiac pulsatility is noted   throughout.     The right innominate vein is patent. The left innominate vein was not   visualized.     The right and left basilic and cephalic veins are patent and compressible.    Impression: No duplex evidence of DVT in the right and left upper   extremity.    RAYMUNDO DUMONT M.D., ATTENDING RADIOLOGIST  This document has been electronically signed. Dec 31 2018  8:49AM     < end of copied text >  ---------------------------------------------------------------------------------------------------------------  < from: CT Angio Chest w/ IV Cont (12.04.18 @ 16:30) >    EXAM:  CT ANGIO CHEST (W)AW IC                          PROCEDURE DATE:  12/04/2018      INTERPRETATION:  CT CHEST WITH CONTRAST    INDICATION: Known COPD not responding to steroids and bronchodilators.   Progressively worsening dyspnea. Evaluate for pulmonary embolism.    IMPRESSION:   1.  No pulmonary embolism.  2. Emphysematous changes of the lung.    DIANA MEDINA M.D., RADIOLOGY RESIDENT  This document has been electronically signed.  JEYSON SANCHEZ M.D., ATTENDING RADIOLOGIST  This document has been electronically signed. Dec  4 2018  5:21PM      < end of copied text >  ---------------------------------------------------------------------------------------------------------------  SPIROMETRY:     FEV1 0.56 liters - 22% predicted  FVC 1.73 liters - 52% predicted  FEV1% 32

## 2019-01-11 NOTE — PROVIDER CONTACT NOTE (OTHER) - REASON
AFIB
BP 96/66
Constipation
HR BACK IN 140S-150S
No BM
Pt refusing FS
QHS Finger Stick 439
fs 51, repeat 49
fs 52, repeat 50
hypoglycemia
pt Afib 130-150 on cardizem gtt @ 15mg/hr, pt asymptomatic
pt Afib 130-150 on tele monitor, pt asymptomatic
pt fingerstick of 68, repeat of 77
pt has redness in right eye. pt also complaining of nausea that is not relieved much after getting ondansetron.
pt stated she felt jittery and shaky
unable to get morning labs or morning ABG by phlebotomy or Respitory
pt with FS 38, repeat 42; 105 after treatment; pt ordered for Lantus 16units

## 2019-01-11 NOTE — PROGRESS NOTE ADULT - PROBLEM SELECTOR PLAN 8
dvt ppx: on apixaban  dispo: transfer to [facility] acute rehab. Social work following - not ready for today. Patient is medically stable for discharge to acute rehab.

## 2019-01-11 NOTE — PROVIDER CONTACT NOTE (OTHER) - DATE AND TIME:
02-Jan-2019 18:00
02-Jan-2019 22:07
05-Jan-2019 10:35
05-Jan-2019 20:00
05-Jan-2019 22:00
06-Dec-2018 18:00
06-Jan-2019 13:55
07-Dec-2018 12:29
09-Dec-2018 06:36
11-Jan-2019 08:30
16-Dec-2018 10:00
16-Dec-2018 15:55
17-Dec-2018 17:12
21-Dec-2018 11:55
29-Nov-2018 23:40
14-Dec-2018 22:50

## 2019-01-11 NOTE — PROVIDER CONTACT NOTE (OTHER) - RECOMMENDATIONS
notify NP
Explained importance of having bowel movements to lower potassium levels.
sliding scale humalog adjustment?
NP aware
NP aware, cards called
NP made aware.
NP notified
PRN added for bowel regimens
as per NP administer 25g ivp dextrose 50% as per protocol
hold med
lopressor? dig loading continues, amiodarone given as ordered.
per provider
pt was given 8 oz apple juice, currently having mashed potatoes with early dinner, fs repeated at 1610 fs 65, repeat at 1629, fs 77, pt had 1 cup of regular cola fs at 1644 was 88,

## 2019-01-11 NOTE — PROGRESS NOTE ADULT - PROBLEM SELECTOR PLAN 1
afib RVR s/p amiodarone, switched to CCB and tolerated well  s/p successful CV   - c/w dilt 240 CR po qd, tolerating well  - monitor on tele  - no longer on amiodarone given lung disease  - c/w apixaban tolerating well

## 2019-01-12 LAB
ANION GAP SERPL CALC-SCNC: 8 MMOL/L — SIGNIFICANT CHANGE UP (ref 5–17)
BUN SERPL-MCNC: 24 MG/DL — HIGH (ref 7–23)
CALCIUM SERPL-MCNC: 9.4 MG/DL — SIGNIFICANT CHANGE UP (ref 8.4–10.5)
CHLORIDE SERPL-SCNC: 87 MMOL/L — LOW (ref 96–108)
CO2 SERPL-SCNC: 43 MMOL/L — HIGH (ref 22–31)
CREAT SERPL-MCNC: 1.19 MG/DL — SIGNIFICANT CHANGE UP (ref 0.5–1.3)
GLUCOSE BLDC GLUCOMTR-MCNC: 110 MG/DL — HIGH (ref 70–99)
GLUCOSE BLDC GLUCOMTR-MCNC: 128 MG/DL — HIGH (ref 70–99)
GLUCOSE BLDC GLUCOMTR-MCNC: 169 MG/DL — HIGH (ref 70–99)
GLUCOSE BLDC GLUCOMTR-MCNC: 205 MG/DL — HIGH (ref 70–99)
GLUCOSE BLDC GLUCOMTR-MCNC: 241 MG/DL — HIGH (ref 70–99)
GLUCOSE SERPL-MCNC: 178 MG/DL — HIGH (ref 70–99)
HCT VFR BLD CALC: 29 % — LOW (ref 34.5–45)
HGB BLD-MCNC: 9.7 G/DL — LOW (ref 11.5–15.5)
MAGNESIUM SERPL-MCNC: 2 MG/DL — SIGNIFICANT CHANGE UP (ref 1.6–2.6)
MCHC RBC-ENTMCNC: 30.5 PG — SIGNIFICANT CHANGE UP (ref 27–34)
MCHC RBC-ENTMCNC: 33.5 GM/DL — SIGNIFICANT CHANGE UP (ref 32–36)
MCV RBC AUTO: 91 FL — SIGNIFICANT CHANGE UP (ref 80–100)
PLATELET # BLD AUTO: 390 K/UL — SIGNIFICANT CHANGE UP (ref 150–400)
POTASSIUM SERPL-MCNC: 4.2 MMOL/L — SIGNIFICANT CHANGE UP (ref 3.5–5.3)
POTASSIUM SERPL-SCNC: 4.2 MMOL/L — SIGNIFICANT CHANGE UP (ref 3.5–5.3)
RBC # BLD: 3.18 M/UL — LOW (ref 3.8–5.2)
RBC # FLD: 15.3 % — HIGH (ref 10.3–14.5)
SODIUM SERPL-SCNC: 138 MMOL/L — SIGNIFICANT CHANGE UP (ref 135–145)
WBC # BLD: 10.9 K/UL — HIGH (ref 3.8–10.5)
WBC # FLD AUTO: 10.9 K/UL — HIGH (ref 3.8–10.5)

## 2019-01-12 PROCEDURE — 99232 SBSQ HOSP IP/OBS MODERATE 35: CPT

## 2019-01-12 RX ADMIN — Medication 40 MILLIGRAM(S): at 07:14

## 2019-01-12 RX ADMIN — Medication 4 UNIT(S): at 12:58

## 2019-01-12 RX ADMIN — Medication 100 MILLIGRAM(S): at 07:14

## 2019-01-12 RX ADMIN — SENNA PLUS 2 TABLET(S): 8.6 TABLET ORAL at 22:52

## 2019-01-12 RX ADMIN — Medication 500 MICROGRAM(S): at 07:11

## 2019-01-12 RX ADMIN — AZITHROMYCIN 250 MILLIGRAM(S): 500 TABLET, FILM COATED ORAL at 09:02

## 2019-01-12 RX ADMIN — MONTELUKAST 10 MILLIGRAM(S): 4 TABLET, CHEWABLE ORAL at 18:49

## 2019-01-12 RX ADMIN — Medication 4 UNIT(S): at 09:01

## 2019-01-12 RX ADMIN — Medication 400 MILLIGRAM(S): at 08:18

## 2019-01-12 RX ADMIN — LEVALBUTEROL 0.63 MILLIGRAM(S): 1.25 SOLUTION, CONCENTRATE RESPIRATORY (INHALATION) at 11:59

## 2019-01-12 RX ADMIN — Medication 2000 UNIT(S): at 12:59

## 2019-01-12 RX ADMIN — Medication 3 UNIT(S): at 18:49

## 2019-01-12 RX ADMIN — Medication 500 MICROGRAM(S): at 00:37

## 2019-01-12 RX ADMIN — Medication 500 MICROGRAM(S): at 22:54

## 2019-01-12 RX ADMIN — Medication 1 DROP(S): at 22:53

## 2019-01-12 RX ADMIN — LEVALBUTEROL 0.63 MILLIGRAM(S): 1.25 SOLUTION, CONCENTRATE RESPIRATORY (INHALATION) at 07:11

## 2019-01-12 RX ADMIN — Medication 1 MILLIGRAM(S): at 22:52

## 2019-01-12 RX ADMIN — Medication 500 MICROGRAM(S): at 17:43

## 2019-01-12 RX ADMIN — LEVALBUTEROL 0.63 MILLIGRAM(S): 1.25 SOLUTION, CONCENTRATE RESPIRATORY (INHALATION) at 00:37

## 2019-01-12 RX ADMIN — Medication 0.5 MILLIGRAM(S): at 07:10

## 2019-01-12 RX ADMIN — LEVALBUTEROL 0.63 MILLIGRAM(S): 1.25 SOLUTION, CONCENTRATE RESPIRATORY (INHALATION) at 22:53

## 2019-01-12 RX ADMIN — PANTOPRAZOLE SODIUM 40 MILLIGRAM(S): 20 TABLET, DELAYED RELEASE ORAL at 07:14

## 2019-01-12 RX ADMIN — Medication 81 MILLIGRAM(S): at 12:59

## 2019-01-12 RX ADMIN — SIMVASTATIN 10 MILLIGRAM(S): 20 TABLET, FILM COATED ORAL at 22:52

## 2019-01-12 RX ADMIN — LEVALBUTEROL 0.63 MILLIGRAM(S): 1.25 SOLUTION, CONCENTRATE RESPIRATORY (INHALATION) at 17:42

## 2019-01-12 RX ADMIN — Medication 1 APPLICATION(S): at 12:15

## 2019-01-12 RX ADMIN — Medication 1 APPLICATION(S): at 12:59

## 2019-01-12 RX ADMIN — APIXABAN 5 MILLIGRAM(S): 2.5 TABLET, FILM COATED ORAL at 07:14

## 2019-01-12 RX ADMIN — Medication 1 DROP(S): at 14:17

## 2019-01-12 RX ADMIN — Medication 1 TABLET(S): at 12:59

## 2019-01-12 RX ADMIN — Medication 10 MILLIGRAM(S): at 08:18

## 2019-01-12 RX ADMIN — Medication 100 MILLIGRAM(S): at 14:55

## 2019-01-12 RX ADMIN — Medication 2: at 18:48

## 2019-01-12 RX ADMIN — Medication 240 MILLIGRAM(S): at 07:14

## 2019-01-12 RX ADMIN — ISOSORBIDE MONONITRATE 30 MILLIGRAM(S): 60 TABLET, EXTENDED RELEASE ORAL at 12:59

## 2019-01-12 RX ADMIN — APIXABAN 5 MILLIGRAM(S): 2.5 TABLET, FILM COATED ORAL at 14:55

## 2019-01-12 RX ADMIN — Medication 0.5 MILLIGRAM(S): at 17:42

## 2019-01-12 RX ADMIN — INSULIN GLARGINE 10 UNIT(S): 100 INJECTION, SOLUTION SUBCUTANEOUS at 22:53

## 2019-01-12 RX ADMIN — Medication 1: at 09:01

## 2019-01-12 RX ADMIN — Medication 500 MICROGRAM(S): at 11:59

## 2019-01-12 RX ADMIN — Medication 100 MILLIGRAM(S): at 22:52

## 2019-01-12 NOTE — PROGRESS NOTE ADULT - PROBLEM SELECTOR PLAN 1
afib RVR s/p amiodarone, switched to CCB and tolerated well  s/p successful CV   - c/w dilt 240 CR po qd  - monitor on tele  - amiodarone stopped given lung disease  - c/w apixaban tolerating well

## 2019-01-12 NOTE — PROGRESS NOTE ADULT - ASSESSMENT
58 F PMH COPD on home O2 3L, HTN, HLD, CAD s/p x6 stents, T2DM, pHTN, PVD, p/w progressive worsening dyspnea x 2 weeks now complicated by chronic progressive hypoxic respiratory failure.     1. New onset AFIB  S/P successful DCCV, remains NSR    EVAN with no evidence of atrial thrombus   continue Cardizem   no longer on amiodarone given lung disease  CHADSVASC 4 continue Eliquis 5mg PO BID  EP f/u     2. Chronic progressive hypoxic respiratory failure  multifactorial in the setting of worsening COPD, CAD, chronic diastolic CHF, pulmonary HTN   bl feet edema improving, continue lasix 40mg PO daily   UE/ LE dopplers negative for DVT   echo with normal LV function, mild diastolic dysfunction, inadequate tricuspid regurg   cv stable no chest pain or sob   bipap PRN  continue dueonebs, inhaled steroids, singulair/theophylline/daliresp, pulm f/u   EKG reviewed, corrected QT WNL (aprox 462), on azithromycin    3. CAD s/p PCI  cv stable, continue asa, imdur    4. COPD   on bipap  continue Duoneb inhaled steroids, singulair/theophylline/daliresp, pulm f/u     5. hyponatremia/hyperkalemia  trend bmp, med f/u    dvt ppx   dc planning per primary team

## 2019-01-12 NOTE — PROGRESS NOTE ADULT - SUBJECTIVE AND OBJECTIVE BOX
CARDIOLOGY FOLLOW UP - Dr. Brunson    CC no cp or sob       PHYSICAL EXAM:  T(C): 36.7 (01-12-19 @ 06:43), Max: 36.7 (01-11-19 @ 20:30)  HR: 74 (01-12-19 @ 06:43) (70 - 90)  BP: 145/67 (01-12-19 @ 06:43) (145/67 - 160/70)  RR: 20 (01-12-19 @ 06:43) (16 - 20)  SpO2: 100% (01-12-19 @ 03:05) (97% - 100%)  Wt(kg): --  I&O's Summary    11 Jan 2019 07:01  -  12 Jan 2019 07:00  --------------------------------------------------------  IN: 850 mL / OUT: 0 mL / NET: 850 mL        Appearance: Normal	  Cardiovascular: Normal S1 S2,RRR, No JVD, No murmurs  Respiratory: diminished   Gastrointestinal:  Soft, Non-tender, + BS	  Extremities: bl +1 edema/ bl foot dressing CDI         MEDICATIONS  (STANDING):  apixaban 5 milliGRAM(s) Oral every 12 hours  artificial tears (preservative free) Ophthalmic Solution 1 Drop(s) Both EYES three times a day  aspirin enteric coated 81 milliGRAM(s) Oral daily  azithromycin   Tablet 250 milliGRAM(s) Oral <User Schedule>  BACItracin   Ointment 1 Application(s) Topical daily  Biotene Dry Mouth Oral Rinse 5 milliLiter(s) Swish and Spit two times a day  bisacodyl Suppository 10 milliGRAM(s) Rectal once  buDESOnide   0.5 milliGRAM(s) Respule 0.5 milliGRAM(s) Inhalation every 12 hours  calamine Lotion 1 Application(s) Topical two times a day  cholecalciferol 2000 Unit(s) Oral daily  dextrose 5%. 1000 milliLiter(s) (50 mL/Hr) IV Continuous <Continuous>  dextrose 50% Injectable 12.5 Gram(s) IV Push once  dextrose 50% Injectable 25 Gram(s) IV Push once  dextrose 50% Injectable 25 Gram(s) IV Push once  diltiazem    milliGRAM(s) Oral daily  docusate sodium 100 milliGRAM(s) Oral three times a day  furosemide    Tablet 40 milliGRAM(s) Oral daily  hydrocortisone 1% Cream 1 Application(s) Topical daily  insulin glargine Injectable (LANTUS) 10 Unit(s) SubCutaneous at bedtime  insulin lispro (HumaLOG) corrective regimen sliding scale   SubCutaneous three times a day before meals  insulin lispro (HumaLOG) corrective regimen sliding scale   SubCutaneous at bedtime  insulin lispro Injectable (HumaLOG) 4 Unit(s) SubCutaneous before breakfast  insulin lispro Injectable (HumaLOG) 3 Unit(s) SubCutaneous with dinner  insulin lispro Injectable (HumaLOG) 4 Unit(s) SubCutaneous before lunch  ipratropium    for Nebulization 500 MICROGram(s) Nebulizer every 6 hours  isosorbide   mononitrate ER Tablet (IMDUR) 30 milliGRAM(s) Oral daily  levalbuterol Inhalation 0.63 milliGRAM(s) Inhalation every 6 hours  melatonin 1 milliGRAM(s) Oral at bedtime  montelukast 10 milliGRAM(s) Oral daily  multivitamin 1 Tablet(s) Oral daily  pantoprazole    Tablet 40 milliGRAM(s) Oral before breakfast  polyethylene glycol 3350 17 Gram(s) Oral daily  predniSONE   Tablet   Oral   predniSONE   Tablet 10 milliGRAM(s) Oral daily  senna 2 Tablet(s) Oral at bedtime  simethicone 80 milliGRAM(s) Chew once  simvastatin 10 milliGRAM(s) Oral at bedtime  theophylline ER Capsule 400 milliGRAM(s) Oral daily      TELEMETRY: NSR	    ECG:  	  RADIOLOGY:   DIAGNOSTIC TESTING:  [ ] Echocardiogram:  [ ]  Catheterization:  [ ] Stress Test:    OTHER: 	    LABS:	 	                                9.7    10.9  )-----------( 390      ( 12 Jan 2019 07:00 )             29.0     01-12    138  |  87<L>  |  24<H>  ----------------------------<  178<H>  4.2   |  43<H>  |  1.19    Ca    9.4      12 Jan 2019 07:00  Mg     2.0     01-12

## 2019-01-12 NOTE — PROGRESS NOTE ADULT - PROBLEM SELECTOR PLAN 8
dvt ppx: on apixaban  dispo: transfer to [facility] acute rehab. Social work following. Patient is medically stable for discharge to acute rehab when bed is available.

## 2019-01-12 NOTE — PROGRESS NOTE ADULT - SUBJECTIVE AND OBJECTIVE BOX
Patient is a 58y old  Female who presents with a chief complaint of dyspnea (12 Jan 2019 10:05)      SUBJECTIVE / OVERNIGHT EVENTS:    No acute overnight events. Pt states that she only used her Bipap for about 3 hrs last night. She didn't feel like doing it any longer.    ROS: ( - ) Fever, ( - )Chills,  ( - )Nausea/Vomiting, ( - ) Cough, ( - )Shortness of breath, ( - )Chest Pain    MEDICATIONS  (STANDING):  apixaban 5 milliGRAM(s) Oral every 12 hours  artificial tears (preservative free) Ophthalmic Solution 1 Drop(s) Both EYES three times a day  aspirin enteric coated 81 milliGRAM(s) Oral daily  azithromycin   Tablet 250 milliGRAM(s) Oral <User Schedule>  BACItracin   Ointment 1 Application(s) Topical daily  Biotene Dry Mouth Oral Rinse 5 milliLiter(s) Swish and Spit two times a day  bisacodyl Suppository 10 milliGRAM(s) Rectal once  buDESOnide   0.5 milliGRAM(s) Respule 0.5 milliGRAM(s) Inhalation every 12 hours  calamine Lotion 1 Application(s) Topical two times a day  cholecalciferol 2000 Unit(s) Oral daily  dextrose 5%. 1000 milliLiter(s) (50 mL/Hr) IV Continuous <Continuous>  dextrose 50% Injectable 12.5 Gram(s) IV Push once  dextrose 50% Injectable 25 Gram(s) IV Push once  dextrose 50% Injectable 25 Gram(s) IV Push once  diltiazem    milliGRAM(s) Oral daily  docusate sodium 100 milliGRAM(s) Oral three times a day  furosemide    Tablet 40 milliGRAM(s) Oral daily  hydrocortisone 1% Cream 1 Application(s) Topical daily  insulin glargine Injectable (LANTUS) 10 Unit(s) SubCutaneous at bedtime  insulin lispro (HumaLOG) corrective regimen sliding scale   SubCutaneous three times a day before meals  insulin lispro (HumaLOG) corrective regimen sliding scale   SubCutaneous at bedtime  insulin lispro Injectable (HumaLOG) 4 Unit(s) SubCutaneous before breakfast  insulin lispro Injectable (HumaLOG) 3 Unit(s) SubCutaneous with dinner  insulin lispro Injectable (HumaLOG) 4 Unit(s) SubCutaneous before lunch  ipratropium    for Nebulization 500 MICROGram(s) Nebulizer every 6 hours  isosorbide   mononitrate ER Tablet (IMDUR) 30 milliGRAM(s) Oral daily  levalbuterol Inhalation 0.63 milliGRAM(s) Inhalation every 6 hours  melatonin 1 milliGRAM(s) Oral at bedtime  montelukast 10 milliGRAM(s) Oral daily  multivitamin 1 Tablet(s) Oral daily  pantoprazole    Tablet 40 milliGRAM(s) Oral before breakfast  polyethylene glycol 3350 17 Gram(s) Oral daily  predniSONE   Tablet   Oral   predniSONE   Tablet 10 milliGRAM(s) Oral daily  senna 2 Tablet(s) Oral at bedtime  simethicone 80 milliGRAM(s) Chew once  simvastatin 10 milliGRAM(s) Oral at bedtime  theophylline ER Capsule 400 milliGRAM(s) Oral daily    MEDICATIONS  (PRN):  aluminum hydroxide/magnesium hydroxide/simethicone Suspension 30 milliLiter(s) Oral every 6 hours PRN Dyspepsia  dextrose 40% Gel 15 Gram(s) Oral once PRN Blood Glucose LESS THAN 70 milliGRAM(s)/deciliter  glucagon  Injectable 1 milliGRAM(s) IntraMuscular once PRN Glucose LESS THAN 70 milligrams/deciliter  ondansetron Injectable 4 milliGRAM(s) IV Push every 6 hours PRN Nausea and/or Vomiting  petrolatum Ophthalmic Ointment 1 Application(s) Both EYES at bedtime PRN dry eyes  sodium chloride 0.65% Nasal 1 Spray(s) Both Nostrils two times a day PRN Nasal Congestion      T(C): 36.4 (01-12 @ 14:24), Max: 36.7 (01-11 @ 20:30)   HR: 83   BP: 157/63   RR: 18   SpO2: 100%    PHYSICAL EXAM:  GENERAL:  Well appearing, obese F, in NAD, w/ nasal cannula breathing comfortably  CHEST/LUNG: CTA B/L w/ decr breath sounds  HEART: Reg rate. Normal S1, S2. No m/r/g.   ABDOMEN: SNTND. Bowel sounds present  EXTREMITIES:  2+ Peripheral Pulses, No clubbing, cyanosis, 2+ LE edema b/l   PSYCH: defensive mood, easily irritable, labile mood    LABS:                        9.7    10.9  )-----------( 390      ( 12 Jan 2019 07:00 )             29.0      01-12    138  |  87<L>  |  24<H>  ----------------------------<  178<H>  4.2   |  43<H>  |  1.19    Ca    9.4      12 Jan 2019 07:00  Mg     2.0     01-12         CAPILLARY BLOOD GLUCOSE      POCT Blood Glucose.: 110 mg/dL (12 Jan 2019 12:51)  POCT Blood Glucose.: 169 mg/dL (12 Jan 2019 08:55)  POCT Blood Glucose.: 178 mg/dL (11 Jan 2019 22:33)  POCT Blood Glucose.: 133 mg/dL (11 Jan 2019 16:46)      RADIOLOGY & ADDITIONAL TESTS:    Imaging Personally Reviewed:  Consultant(s) Notes Reviewed:  Pulmonary and Cardiology  Care Discussed with Consultants/Other Providers:

## 2019-01-12 NOTE — PROGRESS NOTE ADULT - PROBLEM SELECTOR PLAN 2
Completed 7 days of biaxin.   - Pulm following, Dr Fair - he has spoken w/ [facility] planning for transfer once bed is available  - Home Trilogy vent arranged by pulmonary  - c/w bipap at night. Patient does NOT require BIPAP.  - Plan for discharge to acute pulmonary rehab  - c/w Prednisone taper (slow taper) per pulm, Pulmicort & Duoneb nebs  - c/w Theophylline, Singulair

## 2019-01-12 NOTE — PROGRESS NOTE ADULT - PROBLEM SELECTOR PLAN 3
b/l LE chronic for past 3+weeks, likely related to IVF during this admission and dCHF  - keep b/l legs elevated  - podiatry recs appreciated  - c/w po lasix as above  - calamine lotion for pain controlling pain well, hydrocortisone to left foot, r foot w/ bacitracin

## 2019-01-12 NOTE — PROGRESS NOTE ADULT - PROBLEM SELECTOR PLAN 5
A1C 7.2, blood glucose is acceptable   - c/w Lantus 10 units QHS  - c/w Humalog  4-4-3 w/meals   - Continue to monitor blood sugars.

## 2019-01-13 LAB
GLUCOSE BLDC GLUCOMTR-MCNC: 145 MG/DL — HIGH (ref 70–99)
GLUCOSE BLDC GLUCOMTR-MCNC: 174 MG/DL — HIGH (ref 70–99)
GLUCOSE BLDC GLUCOMTR-MCNC: 220 MG/DL — HIGH (ref 70–99)
GLUCOSE BLDC GLUCOMTR-MCNC: 283 MG/DL — HIGH (ref 70–99)

## 2019-01-13 PROCEDURE — 99232 SBSQ HOSP IP/OBS MODERATE 35: CPT

## 2019-01-13 RX ORDER — ACETAMINOPHEN 500 MG
650 TABLET ORAL ONCE
Qty: 0 | Refills: 0 | Status: COMPLETED | OUTPATIENT
Start: 2019-01-13 | End: 2019-01-13

## 2019-01-13 RX ADMIN — LEVALBUTEROL 0.63 MILLIGRAM(S): 1.25 SOLUTION, CONCENTRATE RESPIRATORY (INHALATION) at 19:53

## 2019-01-13 RX ADMIN — ISOSORBIDE MONONITRATE 30 MILLIGRAM(S): 60 TABLET, EXTENDED RELEASE ORAL at 13:07

## 2019-01-13 RX ADMIN — Medication 2000 UNIT(S): at 13:07

## 2019-01-13 RX ADMIN — Medication 0.5 MILLIGRAM(S): at 19:53

## 2019-01-13 RX ADMIN — Medication 100 MILLIGRAM(S): at 18:20

## 2019-01-13 RX ADMIN — Medication 10 MILLIGRAM(S): at 06:48

## 2019-01-13 RX ADMIN — Medication 1 APPLICATION(S): at 11:49

## 2019-01-13 RX ADMIN — POLYETHYLENE GLYCOL 3350 17 GRAM(S): 17 POWDER, FOR SOLUTION ORAL at 13:07

## 2019-01-13 RX ADMIN — Medication 40 MILLIGRAM(S): at 06:48

## 2019-01-13 RX ADMIN — APIXABAN 5 MILLIGRAM(S): 2.5 TABLET, FILM COATED ORAL at 06:48

## 2019-01-13 RX ADMIN — Medication 81 MILLIGRAM(S): at 13:07

## 2019-01-13 RX ADMIN — Medication 1 DROP(S): at 18:21

## 2019-01-13 RX ADMIN — Medication 3: at 13:17

## 2019-01-13 RX ADMIN — APIXABAN 5 MILLIGRAM(S): 2.5 TABLET, FILM COATED ORAL at 18:20

## 2019-01-13 RX ADMIN — INSULIN GLARGINE 10 UNIT(S): 100 INJECTION, SOLUTION SUBCUTANEOUS at 22:43

## 2019-01-13 RX ADMIN — Medication 240 MILLIGRAM(S): at 06:48

## 2019-01-13 RX ADMIN — Medication 3 UNIT(S): at 19:09

## 2019-01-13 RX ADMIN — Medication 400 MILLIGRAM(S): at 09:37

## 2019-01-13 RX ADMIN — Medication 1: at 09:37

## 2019-01-13 RX ADMIN — Medication 100 MILLIGRAM(S): at 06:48

## 2019-01-13 RX ADMIN — Medication 500 MICROGRAM(S): at 11:30

## 2019-01-13 RX ADMIN — Medication 1 APPLICATION(S): at 01:30

## 2019-01-13 RX ADMIN — Medication 0.5 MILLIGRAM(S): at 06:48

## 2019-01-13 RX ADMIN — Medication 500 MICROGRAM(S): at 06:31

## 2019-01-13 RX ADMIN — Medication 1 MILLIGRAM(S): at 22:42

## 2019-01-13 RX ADMIN — Medication 5 MILLILITER(S): at 18:22

## 2019-01-13 RX ADMIN — LEVALBUTEROL 0.63 MILLIGRAM(S): 1.25 SOLUTION, CONCENTRATE RESPIRATORY (INHALATION) at 11:41

## 2019-01-13 RX ADMIN — MONTELUKAST 10 MILLIGRAM(S): 4 TABLET, CHEWABLE ORAL at 13:07

## 2019-01-13 RX ADMIN — Medication 1 TABLET(S): at 13:07

## 2019-01-13 RX ADMIN — SENNA PLUS 2 TABLET(S): 8.6 TABLET ORAL at 22:42

## 2019-01-13 RX ADMIN — PANTOPRAZOLE SODIUM 40 MILLIGRAM(S): 20 TABLET, DELAYED RELEASE ORAL at 06:48

## 2019-01-13 RX ADMIN — Medication 500 MICROGRAM(S): at 19:45

## 2019-01-13 RX ADMIN — Medication 4 UNIT(S): at 09:36

## 2019-01-13 RX ADMIN — Medication 1 DROP(S): at 06:48

## 2019-01-13 RX ADMIN — LEVALBUTEROL 0.63 MILLIGRAM(S): 1.25 SOLUTION, CONCENTRATE RESPIRATORY (INHALATION) at 06:31

## 2019-01-13 RX ADMIN — Medication 1 APPLICATION(S): at 11:47

## 2019-01-13 RX ADMIN — Medication 650 MILLIGRAM(S): at 20:34

## 2019-01-13 RX ADMIN — Medication 4 UNIT(S): at 13:15

## 2019-01-13 RX ADMIN — SIMVASTATIN 10 MILLIGRAM(S): 20 TABLET, FILM COATED ORAL at 22:42

## 2019-01-13 RX ADMIN — Medication 650 MILLIGRAM(S): at 22:41

## 2019-01-13 RX ADMIN — Medication 1 DROP(S): at 22:46

## 2019-01-13 RX ADMIN — Medication 100 MILLIGRAM(S): at 22:42

## 2019-01-13 NOTE — PROGRESS NOTE ADULT - PROBLEM SELECTOR PLAN 2
Completed 7 days of biaxin.   - Pulm following, Dr Fair - he has spoken w/ [facility] planning for transfer once bed is available  - Home Trilogy vent arranged by pulmonary  - c/w bipap at night. Patient does NOT require BIPAP.  - Plan for discharge to acute pulmonary rehab  - c/w Prednisone taper (slow taper) per pulm, Pulmicort & Duoneb nebs  - c/w Theophylline, Singulair

## 2019-01-13 NOTE — PROGRESS NOTE ADULT - ASSESSMENT
59 y/o F w/h/o controlled T2DM on Metformin 500mg bid. Also h/o CAD and COPD now on last day of prednisone 10 mg daily, to be decreased to 5mg daily starting 1/14. BG values 59 y/o F w/h/o controlled T2DM on Metformin 500mg bid. Also h/o CAD and COPD now on last day of prednisone 10 mg daily, to be decreased to 5mg daily starting 1/14. BG values variable dependent on PO intake w/out clear pattern. Prednisone to be reduced to 5mg daily. Will need to observe and possibly adjust down based on FS values in next 24 hours. BG goal (100-180mg/dl). Pt awaiting transfer to [facility].

## 2019-01-13 NOTE — PROGRESS NOTE ADULT - ASSESSMENT
58 F PMH COPD on home O2 3L, HTN, HLD, CAD s/p x6 stents, T2DM, pHTN, PVD, p/w progressive worsening dyspnea x 2 weeks now complicated by chronic progressive hypoxic respiratory failure.     1. New onset AFIB  S/P successful DCCV, remains NSR    EVAN with no evidence of atrial thrombus   continue Cardizem   no longer on amiodarone given lung disease  CHADSVASC 4 continue Eliquis 5mg PO BID  EP f/u     2. Chronic progressive hypoxic respiratory failure  multifactorial in the setting of worsening COPD, CAD, chronic diastolic CHF, pulmonary HTN   LE edema stable, continue lasix 40mg PO daily   UE/ LE dopplers negative for DVT   echo with normal LV function, mild diastolic dysfunction, inadequate tricuspid regurg   cv stable no chest pain or sob   bipap PRN  continue dueonebs, inhaled steroids, singulair/theophylline/daliresp, pulm f/u   EKG reviewed, corrected QT WNL (aprox 462), on azithromycin    3. CAD s/p PCI  cv stable, continue asa, imdur    4. COPD   on bipap  continue Duoneb inhaled steroids, singulair/theophylline/daliresp, pulm f/u     5. hyponatremia/hyperkalemia  trend bmp, med f/u    dvt ppx   dc planning per primary team

## 2019-01-13 NOTE — PROGRESS NOTE ADULT - SUBJECTIVE AND OBJECTIVE BOX
Diabetes Follow up note:  Interval Hx:  57 y/o F w/h/o controlled T2DM on Metformin 500mg bid. Also h/o CAD and COPD. Here with COPD exacerbation>on prednisone taper 10mg til 1/13 then to decrease to 5mg til 1/19.      Review of Systems:  General:  GI: Tolerating POs without any N/V/D/ABD PAIN.  CV: No CP/SOB  ENDO: No S&Sx of hypoglycemia  MEDS:    insulin glargine Injectable (LANTUS) 10 Unit(s) SubCutaneous at bedtime  insulin lispro (HumaLOG) corrective regimen sliding scale   SubCutaneous three times a day before meals  insulin lispro (HumaLOG) corrective regimen sliding scale   SubCutaneous at bedtime  insulin lispro Injectable (HumaLOG) 4 Unit(s) SubCutaneous before breakfast  insulin lispro Injectable (HumaLOG) 3 Unit(s) SubCutaneous with dinner  insulin lispro Injectable (HumaLOG) 4 Unit(s) SubCutaneous before lunch  predniSONE   Tablet   Oral   simvastatin 10 milliGRAM(s) Oral at bedtime    azithromycin   Tablet 250 milliGRAM(s) Oral <User Schedule>    Allergies    No Known Allergies    Intolerances    albuterol (Unknown)    PE:  General: Female  Vital Signs Last 24 Hrs  T(C): 36.2 (12 Jan 2019 22:43), Max: 36.4 (12 Jan 2019 14:24)  T(F): 97.2 (12 Jan 2019 22:43), Max: 97.6 (12 Jan 2019 14:24)  HR: 82 (13 Jan 2019 08:53) (72 - 83)  BP: 148/71 (13 Jan 2019 06:29) (148/71 - 157/63)  BP(mean): --  RR: 17 (13 Jan 2019 06:29) (17 - 18)  SpO2: 96% (13 Jan 2019 06:32) (96% - 100%)  Abd: Soft, NT,ND,   Extremities: Warm  Neuro: A&O X3    LABS:    POCT Blood Glucose.: 283 mg/dL (01-13-19 @ 13:13)  POCT Blood Glucose.: 174 mg/dL (01-13-19 @ 09:36)  POCT Blood Glucose.: 128 mg/dL (01-12-19 @ 22:38)  POCT Blood Glucose.: 205 mg/dL (01-12-19 @ 18:41)  POCT Blood Glucose.: 241 mg/dL (01-12-19 @ 17:33)  POCT Blood Glucose.: 110 mg/dL (01-12-19 @ 12:51)  POCT Blood Glucose.: 169 mg/dL (01-12-19 @ 08:55)  POCT Blood Glucose.: 178 mg/dL (01-11-19 @ 22:33)  POCT Blood Glucose.: 133 mg/dL (01-11-19 @ 16:46)  POCT Blood Glucose.: 180 mg/dL (01-11-19 @ 13:28)  POCT Blood Glucose.: 199 mg/dL (01-11-19 @ 10:05)  POCT Blood Glucose.: 201 mg/dL (01-10-19 @ 21:54)  POCT Blood Glucose.: 174 mg/dL (01-10-19 @ 20:42)  POCT Blood Glucose.: 149 mg/dL (01-10-19 @ 16:56)                            9.7    10.9  )-----------( 390      ( 12 Jan 2019 07:00 )             29.0       01-12    138  |  87<L>  |  24<H>  ----------------------------<  178<H>  4.2   |  43<H>  |  1.19    Ca    9.4      12 Jan 2019 07:00  Mg     2.0     01-12        Hemoglobin A1C, Whole Blood: 7.2 % <H> [4.0 - 5.6] (11-30-18 @ 07:58)            Contact number: kateryna [phone] or [phone] Diabetes Follow up note:  Interval Hx:  57 y/o F w/h/o controlled T2DM on Metformin 500mg bid. Also h/o CAD and COPD. Here with COPD exacerbation>on prednisone taper 10mg til 1/13 then to decrease to 5mg til 1/19. BG values remain erratic on present insulin regimen/steroids. Pt reports eating 1 slice of Vietnamese toast, 2 hard boiled eggs and peanut butter w/apple slices for breakfast this morning. LE edema improved but pt requesting podiatry re-evaluate feet prior to going to rehab.       Review of Systems:  General: + foot pain/blisters  GI: Tolerating POs without any N/V/D/ABD PAIN.  CV: No CP/SOB  ENDO: No S&Sx of hypoglycemia  MEDS:    insulin glargine Injectable (LANTUS) 10 Unit(s) SubCutaneous at bedtime  insulin lispro (HumaLOG) corrective regimen sliding scale   SubCutaneous three times a day before meals  insulin lispro (HumaLOG) corrective regimen sliding scale   SubCutaneous at bedtime  insulin lispro Injectable (HumaLOG) 4 Unit(s) SubCutaneous before breakfast  insulin lispro Injectable (HumaLOG) 3 Unit(s) SubCutaneous with dinner  insulin lispro Injectable (HumaLOG) 4 Unit(s) SubCutaneous before lunch  predniSONE   Tablet   Oral   simvastatin 10 milliGRAM(s) Oral at bedtime    azithromycin   Tablet 250 milliGRAM(s) Oral <User Schedule>    Allergies    No Known Allergies    Intolerances    albuterol (Unknown)    PE:  General: Female lying in bed. NAD.   Vital Signs Last 24 Hrs  T(C): 36.2 (12 Jan 2019 22:43), Max: 36.4 (12 Jan 2019 14:24)  T(F): 97.2 (12 Jan 2019 22:43), Max: 97.6 (12 Jan 2019 14:24)  HR: 82 (13 Jan 2019 08:53) (72 - 83)  BP: 148/71 (13 Jan 2019 06:29) (148/71 - 157/63)  BP(mean): --  RR: 17 (13 Jan 2019 06:29) (17 - 18)  SpO2: 96% (13 Jan 2019 06:32) (96% - 100%)  Abd: Soft, NT,ND, Obese.   Extremities: Warm. B/L LE edema improving. B/L Foot dsg c/d/i  Neuro: A&O X3    LABS:    POCT Blood Glucose.: 283 mg/dL (01-13-19 @ 13:13)  POCT Blood Glucose.: 174 mg/dL (01-13-19 @ 09:36)  POCT Blood Glucose.: 128 mg/dL (01-12-19 @ 22:38)  POCT Blood Glucose.: 205 mg/dL (01-12-19 @ 18:41)  POCT Blood Glucose.: 241 mg/dL (01-12-19 @ 17:33)  POCT Blood Glucose.: 110 mg/dL (01-12-19 @ 12:51)  POCT Blood Glucose.: 169 mg/dL (01-12-19 @ 08:55)  POCT Blood Glucose.: 178 mg/dL (01-11-19 @ 22:33)  POCT Blood Glucose.: 133 mg/dL (01-11-19 @ 16:46)  POCT Blood Glucose.: 180 mg/dL (01-11-19 @ 13:28)  POCT Blood Glucose.: 199 mg/dL (01-11-19 @ 10:05)  POCT Blood Glucose.: 201 mg/dL (01-10-19 @ 21:54)  POCT Blood Glucose.: 174 mg/dL (01-10-19 @ 20:42)  POCT Blood Glucose.: 149 mg/dL (01-10-19 @ 16:56)                            9.7    10.9  )-----------( 390      ( 12 Jan 2019 07:00 )             29.0       01-12    138  |  87<L>  |  24<H>  ----------------------------<  178<H>  4.2   |  43<H>  |  1.19    Ca    9.4      12 Jan 2019 07:00  Mg     2.0     01-12        Hemoglobin A1C, Whole Blood: 7.2 % <H> [4.0 - 5.6] (11-30-18 @ 07:58)            Contact number: kateryna [phone] or [phone]

## 2019-01-13 NOTE — PROGRESS NOTE ADULT - PROBLEM SELECTOR PLAN 1
-test BG AC/HS  -c/w Lantus 10 units QHS  -c/w Humalog 4-4-3 w/meals for now. May need adjustment on lowered prednisone dose but would observe for now.  -c/w humalog low correction scale AC and Low HS scale  discharge plan: restart Metformin if off steroids  -Plan discussed with pt/team/staff. If discharge to rehab on steroid will continue basal/bolus therapy. Doses TBD  pager: [phone]

## 2019-01-13 NOTE — PROGRESS NOTE ADULT - SUBJECTIVE AND OBJECTIVE BOX
CARDIOLOGY FOLLOW UP - Dr. Brunson    CC no cp or sob        PHYSICAL EXAM:  T(C): 36.2 (01-12-19 @ 22:43), Max: 36.4 (01-12-19 @ 14:24)  HR: 82 (01-13-19 @ 08:53) (72 - 86)  BP: 148/71 (01-13-19 @ 06:29) (148/71 - 157/63)  RR: 17 (01-13-19 @ 06:29) (17 - 18)  SpO2: 96% (01-13-19 @ 06:32) (96% - 100%)  Wt(kg): --  I&O's Summary    12 Jan 2019 07:01  -  13 Jan 2019 07:00  --------------------------------------------------------  IN: 580 mL / OUT: 1 mL / NET: 579 mL        Appearance: Normal	  Cardiovascular: Normal S1 S2,RRR, No JVD, No murmurs  Respiratory: diminished    Gastrointestinal:  Soft, Non-tender, + BS	  Extremities: bl + LE  edema, + dressings bl CDI        MEDICATIONS  (STANDING):  apixaban 5 milliGRAM(s) Oral every 12 hours  artificial tears (preservative free) Ophthalmic Solution 1 Drop(s) Both EYES three times a day  aspirin enteric coated 81 milliGRAM(s) Oral daily  azithromycin   Tablet 250 milliGRAM(s) Oral <User Schedule>  BACItracin   Ointment 1 Application(s) Topical daily  Biotene Dry Mouth Oral Rinse 5 milliLiter(s) Swish and Spit two times a day  bisacodyl Suppository 10 milliGRAM(s) Rectal once  buDESOnide   0.5 milliGRAM(s) Respule 0.5 milliGRAM(s) Inhalation every 12 hours  calamine Lotion 1 Application(s) Topical two times a day  cholecalciferol 2000 Unit(s) Oral daily  dextrose 5%. 1000 milliLiter(s) (50 mL/Hr) IV Continuous <Continuous>  dextrose 50% Injectable 12.5 Gram(s) IV Push once  dextrose 50% Injectable 25 Gram(s) IV Push once  dextrose 50% Injectable 25 Gram(s) IV Push once  diltiazem    milliGRAM(s) Oral daily  docusate sodium 100 milliGRAM(s) Oral three times a day  furosemide    Tablet 40 milliGRAM(s) Oral daily  hydrocortisone 1% Cream 1 Application(s) Topical daily  insulin glargine Injectable (LANTUS) 10 Unit(s) SubCutaneous at bedtime  insulin lispro (HumaLOG) corrective regimen sliding scale   SubCutaneous three times a day before meals  insulin lispro (HumaLOG) corrective regimen sliding scale   SubCutaneous at bedtime  insulin lispro Injectable (HumaLOG) 4 Unit(s) SubCutaneous before breakfast  insulin lispro Injectable (HumaLOG) 3 Unit(s) SubCutaneous with dinner  insulin lispro Injectable (HumaLOG) 4 Unit(s) SubCutaneous before lunch  ipratropium    for Nebulization 500 MICROGram(s) Nebulizer every 6 hours  isosorbide   mononitrate ER Tablet (IMDUR) 30 milliGRAM(s) Oral daily  levalbuterol Inhalation 0.63 milliGRAM(s) Inhalation every 6 hours  melatonin 1 milliGRAM(s) Oral at bedtime  montelukast 10 milliGRAM(s) Oral daily  multivitamin 1 Tablet(s) Oral daily  pantoprazole    Tablet 40 milliGRAM(s) Oral before breakfast  polyethylene glycol 3350 17 Gram(s) Oral daily  predniSONE   Tablet   Oral   senna 2 Tablet(s) Oral at bedtime  simethicone 80 milliGRAM(s) Chew once  simvastatin 10 milliGRAM(s) Oral at bedtime  theophylline ER Capsule 400 milliGRAM(s) Oral daily      TELEMETRY: NSR 	    ECG:  	  RADIOLOGY:   DIAGNOSTIC TESTING:  [ ] Echocardiogram:  [ ]  Catheterization:  [ ] Stress Test:    OTHER: 	    LABS:	 	                                9.7    10.9  )-----------( 390      ( 12 Jan 2019 07:00 )             29.0     01-12    138  |  87<L>  |  24<H>  ----------------------------<  178<H>  4.2   |  43<H>  |  1.19    Ca    9.4      12 Jan 2019 07:00  Mg     2.0     01-12

## 2019-01-13 NOTE — PROGRESS NOTE ADULT - PROBLEM SELECTOR PLAN 8
dvt ppx: on apixaban  dispo: transfer to [facility] acute rehab. Social work following. Patient is medically stable for discharge to acute rehab when bed is available.

## 2019-01-13 NOTE — PROGRESS NOTE ADULT - SUBJECTIVE AND OBJECTIVE BOX
Patient is a 58y old  Female who presents with a chief complaint of dyspnea (13 Jan 2019 13:51)      SUBJECTIVE / OVERNIGHT EVENTS:    No acute overnight events. Pt states that she again only used her Bipap for about 3 hrs last night. She placed on her BiPap after having increasing difficulty with breathing.    ROS: ( - ) Fever, ( - )Chills,  ( - )Nausea/Vomiting, ( - ) Cough, ( - )Shortness of breath, ( - )Chest Pain    MEDICATIONS  (STANDING):  apixaban 5 milliGRAM(s) Oral every 12 hours  artificial tears (preservative free) Ophthalmic Solution 1 Drop(s) Both EYES three times a day  aspirin enteric coated 81 milliGRAM(s) Oral daily  azithromycin   Tablet 250 milliGRAM(s) Oral <User Schedule>  BACItracin   Ointment 1 Application(s) Topical daily  Biotene Dry Mouth Oral Rinse 5 milliLiter(s) Swish and Spit two times a day  bisacodyl Suppository 10 milliGRAM(s) Rectal once  buDESOnide   0.5 milliGRAM(s) Respule 0.5 milliGRAM(s) Inhalation every 12 hours  calamine Lotion 1 Application(s) Topical two times a day  cholecalciferol 2000 Unit(s) Oral daily  dextrose 5%. 1000 milliLiter(s) (50 mL/Hr) IV Continuous <Continuous>  dextrose 50% Injectable 12.5 Gram(s) IV Push once  dextrose 50% Injectable 25 Gram(s) IV Push once  dextrose 50% Injectable 25 Gram(s) IV Push once  diltiazem    milliGRAM(s) Oral daily  docusate sodium 100 milliGRAM(s) Oral three times a day  furosemide    Tablet 40 milliGRAM(s) Oral daily  hydrocortisone 1% Cream 1 Application(s) Topical daily  insulin glargine Injectable (LANTUS) 10 Unit(s) SubCutaneous at bedtime  insulin lispro (HumaLOG) corrective regimen sliding scale   SubCutaneous three times a day before meals  insulin lispro (HumaLOG) corrective regimen sliding scale   SubCutaneous at bedtime  insulin lispro Injectable (HumaLOG) 4 Unit(s) SubCutaneous before breakfast  insulin lispro Injectable (HumaLOG) 3 Unit(s) SubCutaneous with dinner  insulin lispro Injectable (HumaLOG) 4 Unit(s) SubCutaneous before lunch  ipratropium    for Nebulization 500 MICROGram(s) Nebulizer every 6 hours  isosorbide   mononitrate ER Tablet (IMDUR) 30 milliGRAM(s) Oral daily  levalbuterol Inhalation 0.63 milliGRAM(s) Inhalation every 6 hours  melatonin 1 milliGRAM(s) Oral at bedtime  montelukast 10 milliGRAM(s) Oral daily  multivitamin 1 Tablet(s) Oral daily  pantoprazole    Tablet 40 milliGRAM(s) Oral before breakfast  polyethylene glycol 3350 17 Gram(s) Oral daily  predniSONE   Tablet   Oral   senna 2 Tablet(s) Oral at bedtime  simethicone 80 milliGRAM(s) Chew once  simvastatin 10 milliGRAM(s) Oral at bedtime  theophylline ER Capsule 400 milliGRAM(s) Oral daily    MEDICATIONS  (PRN):  aluminum hydroxide/magnesium hydroxide/simethicone Suspension 30 milliLiter(s) Oral every 6 hours PRN Dyspepsia  dextrose 40% Gel 15 Gram(s) Oral once PRN Blood Glucose LESS THAN 70 milliGRAM(s)/deciliter  glucagon  Injectable 1 milliGRAM(s) IntraMuscular once PRN Glucose LESS THAN 70 milligrams/deciliter  ondansetron Injectable 4 milliGRAM(s) IV Push every 6 hours PRN Nausea and/or Vomiting  petrolatum Ophthalmic Ointment 1 Application(s) Both EYES at bedtime PRN dry eyes  sodium chloride 0.65% Nasal 1 Spray(s) Both Nostrils two times a day PRN Nasal Congestion      T(C): 36.2 (01-12 @ 22:43), Max: 36.2 (01-12 @ 22:43)   HR: 77   BP: 148/71   RR: 17   SpO2: 96%    PHYSICAL EXAM:  GENERAL:  Well appearing, obese F, in NAD, w/ nasal cannula breathing comfortably  CHEST/LUNG: CTA B/L w/ decr breath sounds  HEART: Reg rate. Normal S1, S2. No m/r/g.   ABDOMEN: soft, non-tender, non-distended. Bowel sounds present  EXTREMITIES:  2+ Peripheral Pulses, No clubbing, cyanosis, 2+ LE edema b/l, b/l feet with dressing C/D/I  PSYCH: defensive mood, easily irritable, labile mood    LABS:                        9.7    10.9  )-----------( 390      ( 12 Jan 2019 07:00 )             29.0      01-12    138  |  87<L>  |  24<H>  ----------------------------<  178<H>  4.2   |  43<H>  |  1.19    Ca    9.4      12 Jan 2019 07:00  Mg     2.0     01-12         CAPILLARY BLOOD GLUCOSE      POCT Blood Glucose.: 283 mg/dL (13 Jan 2019 13:13)  POCT Blood Glucose.: 174 mg/dL (13 Jan 2019 09:36)  POCT Blood Glucose.: 128 mg/dL (12 Jan 2019 22:38)  POCT Blood Glucose.: 205 mg/dL (12 Jan 2019 18:41)  POCT Blood Glucose.: 241 mg/dL (12 Jan 2019 17:33)      RADIOLOGY & ADDITIONAL TESTS:    Imaging Personally Reviewed:  Consultant(s) Notes Reviewed:    Care Discussed with Consultants/Other Providers:

## 2019-01-14 ENCOUNTER — RX RENEWAL (OUTPATIENT)
Age: 59
End: 2019-01-14

## 2019-01-14 LAB
GLUCOSE BLDC GLUCOMTR-MCNC: 111 MG/DL — HIGH (ref 70–99)
GLUCOSE BLDC GLUCOMTR-MCNC: 134 MG/DL — HIGH (ref 70–99)
GLUCOSE BLDC GLUCOMTR-MCNC: 176 MG/DL — HIGH (ref 70–99)
GLUCOSE BLDC GLUCOMTR-MCNC: 407 MG/DL — HIGH (ref 70–99)

## 2019-01-14 PROCEDURE — 99232 SBSQ HOSP IP/OBS MODERATE 35: CPT

## 2019-01-14 RX ORDER — INSULIN LISPRO 100/ML
5 VIAL (ML) SUBCUTANEOUS
Qty: 0 | Refills: 0 | Status: DISCONTINUED | OUTPATIENT
Start: 2019-01-14 | End: 2019-01-15

## 2019-01-14 RX ORDER — INSULIN GLARGINE 100 [IU]/ML
8 INJECTION, SOLUTION SUBCUTANEOUS AT BEDTIME
Qty: 0 | Refills: 0 | Status: DISCONTINUED | OUTPATIENT
Start: 2019-01-14 | End: 2019-01-15

## 2019-01-14 RX ADMIN — POLYETHYLENE GLYCOL 3350 17 GRAM(S): 17 POWDER, FOR SOLUTION ORAL at 13:16

## 2019-01-14 RX ADMIN — Medication 4 UNIT(S): at 13:13

## 2019-01-14 RX ADMIN — Medication 1 DROP(S): at 21:29

## 2019-01-14 RX ADMIN — Medication 1 DROP(S): at 06:33

## 2019-01-14 RX ADMIN — Medication 40 MILLIGRAM(S): at 06:30

## 2019-01-14 RX ADMIN — Medication 100 MILLIGRAM(S): at 06:31

## 2019-01-14 RX ADMIN — SIMVASTATIN 10 MILLIGRAM(S): 20 TABLET, FILM COATED ORAL at 21:25

## 2019-01-14 RX ADMIN — Medication 500 MICROGRAM(S): at 01:15

## 2019-01-14 RX ADMIN — Medication 5 MILLILITER(S): at 18:35

## 2019-01-14 RX ADMIN — SENNA PLUS 2 TABLET(S): 8.6 TABLET ORAL at 21:25

## 2019-01-14 RX ADMIN — APIXABAN 5 MILLIGRAM(S): 2.5 TABLET, FILM COATED ORAL at 06:30

## 2019-01-14 RX ADMIN — LEVALBUTEROL 0.63 MILLIGRAM(S): 1.25 SOLUTION, CONCENTRATE RESPIRATORY (INHALATION) at 17:37

## 2019-01-14 RX ADMIN — Medication 6: at 13:13

## 2019-01-14 RX ADMIN — Medication 500 MICROGRAM(S): at 23:15

## 2019-01-14 RX ADMIN — Medication 1 APPLICATION(S): at 17:38

## 2019-01-14 RX ADMIN — LEVALBUTEROL 0.63 MILLIGRAM(S): 1.25 SOLUTION, CONCENTRATE RESPIRATORY (INHALATION) at 01:14

## 2019-01-14 RX ADMIN — Medication 1 MILLIGRAM(S): at 21:25

## 2019-01-14 RX ADMIN — Medication 2000 UNIT(S): at 13:12

## 2019-01-14 RX ADMIN — Medication 1 TABLET(S): at 13:13

## 2019-01-14 RX ADMIN — Medication 400 MILLIGRAM(S): at 09:37

## 2019-01-14 RX ADMIN — Medication 81 MILLIGRAM(S): at 13:12

## 2019-01-14 RX ADMIN — Medication 100 MILLIGRAM(S): at 13:14

## 2019-01-14 RX ADMIN — Medication 100 MILLIGRAM(S): at 21:25

## 2019-01-14 RX ADMIN — ISOSORBIDE MONONITRATE 30 MILLIGRAM(S): 60 TABLET, EXTENDED RELEASE ORAL at 13:16

## 2019-01-14 RX ADMIN — LEVALBUTEROL 0.63 MILLIGRAM(S): 1.25 SOLUTION, CONCENTRATE RESPIRATORY (INHALATION) at 22:58

## 2019-01-14 RX ADMIN — CALAMINE AND ZINC OXIDE AND PHENOL 1 APPLICATION(S): 160; 10 LOTION TOPICAL at 06:33

## 2019-01-14 RX ADMIN — Medication 240 MILLIGRAM(S): at 06:32

## 2019-01-14 RX ADMIN — Medication 0.5 MILLIGRAM(S): at 17:37

## 2019-01-14 RX ADMIN — Medication 4 UNIT(S): at 09:37

## 2019-01-14 RX ADMIN — PANTOPRAZOLE SODIUM 40 MILLIGRAM(S): 20 TABLET, DELAYED RELEASE ORAL at 06:46

## 2019-01-14 RX ADMIN — Medication 500 MICROGRAM(S): at 17:40

## 2019-01-14 RX ADMIN — LEVALBUTEROL 0.63 MILLIGRAM(S): 1.25 SOLUTION, CONCENTRATE RESPIRATORY (INHALATION) at 11:25

## 2019-01-14 RX ADMIN — Medication 0.5 MILLIGRAM(S): at 06:36

## 2019-01-14 RX ADMIN — Medication 3 UNIT(S): at 17:37

## 2019-01-14 RX ADMIN — Medication 5 MILLIGRAM(S): at 09:37

## 2019-01-14 RX ADMIN — APIXABAN 5 MILLIGRAM(S): 2.5 TABLET, FILM COATED ORAL at 18:35

## 2019-01-14 RX ADMIN — Medication 500 MICROGRAM(S): at 11:25

## 2019-01-14 RX ADMIN — Medication 500 MICROGRAM(S): at 06:30

## 2019-01-14 RX ADMIN — MONTELUKAST 10 MILLIGRAM(S): 4 TABLET, CHEWABLE ORAL at 13:13

## 2019-01-14 RX ADMIN — INSULIN GLARGINE 8 UNIT(S): 100 INJECTION, SOLUTION SUBCUTANEOUS at 21:46

## 2019-01-14 RX ADMIN — LEVALBUTEROL 0.63 MILLIGRAM(S): 1.25 SOLUTION, CONCENTRATE RESPIRATORY (INHALATION) at 06:30

## 2019-01-14 NOTE — PROGRESS NOTE ADULT - PROBLEM SELECTOR PLAN 6
-Chronic   -Echo with mild diastolic dysfunction, no significant changes from prior study in 2014.  -Continue Lasix

## 2019-01-14 NOTE — PROGRESS NOTE ADULT - PROBLEM SELECTOR PLAN 4
-on home O2 3L.   -Continue COPD management as above.  -Continue on BIPAP bedtime  -Plan for discharge to acute pulmonary rehab at [facility] pending insurance approval. Pt pending lung transplant rohit w/ [facility]

## 2019-01-14 NOTE — PROGRESS NOTE ADULT - SUBJECTIVE AND OBJECTIVE BOX
[facility] - [facility]     PROGRESS NOTE    CHIEF COMPLAINT: chronic hypoxic/hypercapnic respiratory failure; COPD exacerbation; emphysema; dyspnea; obesity; atrial fibrillation with RVR (resolved)    INTERVAL HISTORY: anxious over the weekend due to upcoming discharge from the hospital and transfer to acute rehab ([facility]) with difficulty falling asleep and intermittent shortness of breath during the day without hypoxemia; much less foot swelling with decreased pain on the top of the feet due to tiny lacerations which have been dressed; s/p successful DCCV last week - remains in NSR - off amiodarone and beta-blockers; off BIPAP during the day; ABG with improved hypercapnia without acidemia; arm swelling has resolved on diuretics and reduced dose of steroids; decreased lower extremity swelling with leg elevation; improved leg strength now easily standing up from a chair or commode and ambulating with physical therapy; resolved AURORA, hyperkalemia and hyponatremia; no cough, sputum production, chest congestion or wheeze; no fevers, chills or sweats; no chest pain/pressure or palpitations;     REVIEW OF SYSTEMS:  Constitutional: As per interval history  HEENT: Within normal limits  CV: As per interval history  Resp: As per interval history  GI: Within normal limits   : Within normal limits  Musculoskeletal: improving lower extremity weakness   Skin: Within normal limits  Neurological: Within normal limits  Psychiatric: frustrated   Endocrine: Within normal limits  Hematologic/Lymphatic: Within normal limits  Allergic/Immunologic: Within normal limits      MEDICATIONS:     Pulmonary "  buDESOnide   0.5 milliGRAM(s) Respule 0.5 milliGRAM(s) Inhalation every 12 hours  ipratropium    for Nebulization 500 MICROGram(s) Nebulizer every 6 hours  levalbuterol Inhalation 0.63 milliGRAM(s) Inhalation every 6 hours  montelukast 10 milliGRAM(s) Oral daily  theophylline ER Capsule 400 milliGRAM(s) Oral daily      Anti-microbials:  azithromycin   Tablet 250 milliGRAM(s) Oral <User Schedule>      Cardiovascular:  diltiazem    milliGRAM(s) Oral daily  furosemide    Tablet 40 milliGRAM(s) Oral daily  isosorbide   mononitrate ER Tablet (IMDUR) 30 milliGRAM(s) Oral daily      Other:  aluminum hydroxide/magnesium hydroxide/simethicone Suspension 30 milliLiter(s) Oral every 6 hours PRN  apixaban 5 milliGRAM(s) Oral every 12 hours  artificial tears (preservative free) Ophthalmic Solution 1 Drop(s) Both EYES three times a day  aspirin enteric coated 81 milliGRAM(s) Oral daily  BACItracin   Ointment 1 Application(s) Topical daily  Biotene Dry Mouth Oral Rinse 5 milliLiter(s) Swish and Spit two times a day  bisacodyl Suppository 10 milliGRAM(s) Rectal once  calamine Lotion 1 Application(s) Topical two times a day  cholecalciferol 2000 Unit(s) Oral daily  dextrose 40% Gel 15 Gram(s) Oral once PRN  dextrose 5%. 1000 milliLiter(s) IV Continuous <Continuous>  dextrose 50% Injectable 12.5 Gram(s) IV Push once  dextrose 50% Injectable 25 Gram(s) IV Push once  dextrose 50% Injectable 25 Gram(s) IV Push once  docusate sodium 100 milliGRAM(s) Oral three times a day  glucagon  Injectable 1 milliGRAM(s) IntraMuscular once PRN  hydrocortisone 1% Cream 1 Application(s) Topical daily  insulin glargine Injectable (LANTUS) 10 Unit(s) SubCutaneous at bedtime  insulin lispro (HumaLOG) corrective regimen sliding scale   SubCutaneous three times a day before meals  insulin lispro (HumaLOG) corrective regimen sliding scale   SubCutaneous at bedtime  insulin lispro Injectable (HumaLOG) 4 Unit(s) SubCutaneous before breakfast  insulin lispro Injectable (HumaLOG) 3 Unit(s) SubCutaneous with dinner  insulin lispro Injectable (HumaLOG) 4 Unit(s) SubCutaneous before lunch  melatonin 1 milliGRAM(s) Oral at bedtime  multivitamin 1 Tablet(s) Oral daily  ondansetron Injectable 4 milliGRAM(s) IV Push every 6 hours PRN  pantoprazole    Tablet 40 milliGRAM(s) Oral before breakfast  petrolatum Ophthalmic Ointment 1 Application(s) Both EYES at bedtime PRN  polyethylene glycol 3350 17 Gram(s) Oral daily  predniSONE   Tablet   Oral   predniSONE   Tablet 5 milliGRAM(s) Oral daily  senna 2 Tablet(s) Oral at bedtime  simethicone 80 milliGRAM(s) Chew once  simvastatin 10 milliGRAM(s) Oral at bedtime  sodium chloride 0.65% Nasal 1 Spray(s) Both Nostrils two times a day PRN    OBJECTIVE:    POCT Blood Glucose.: 220 mg/dL (13 Jan 2019 22:30)  POCT Blood Glucose.: 145 mg/dL (13 Jan 2019 18:12)  POCT Blood Glucose.: 283 mg/dL (13 Jan 2019 13:13)  POCT Blood Glucose.: 174 mg/dL (13 Jan 2019 09:36)      PHYSICAL EXAM:       ICU Vital Signs Last 24 Hrs  T(C): 36.9 (13 Jan 2019 16:10), Max: 36.9 (13 Jan 2019 16:10)  T(F): 98.4 (13 Jan 2019 16:10), Max: 98.4 (13 Jan 2019 16:10)  HR: 78 (14 Jan 2019 05:45) (71 - 86)  BP: 158/74 (13 Jan 2019 16:10) (158/74 - 158/74)  BP(mean): --  ABP: --  ABP(mean): --  RR: 18 (13 Jan 2019 16:10) (18 - 18)  SpO2: 94% (14 Jan 2019 05:45) (94% - 98%) on 4lpm nasal canula     General: Awake. Alert. Cooperative. No distress. Appears stated age. Obese.   HEENT:  Atraumatic. Normocephalic. Anicteric. Normal oral mucosa. PERRL. EOMI.   Neck: Supple. Trachea midline. Thyroid without enlargement/tenderness/nodules. No carotid bruit. No JVD.	  Cardiovascular: Regular rate and rhythm. Distant S1 S2. No murmurs, rubs or gallops.  Respiratory: Respirations unlabored. Markedly decreased breath sounds throughout. No wheeze. No curvature.  Abdomen: Soft. Non-tender. Non-distended. No organomegaly. No masses. Normal bowel sounds. Obese.  Extremities: Warm to touch. No clubbing or cyanosis. Mild bilateral lower extremity edema up to the thigh. Resolved upper extremity swelling. Bilateral foot swelling with bullae, erythema and tenderness to palpitation - bandaged.  Pulses: Decreased lower extremity peripheral pulses.  Skin: No rashes or lesions. No ecchymoses. No cyanosis. Warm to touch.   Lymph Nodes: Cervical, supraclavicular and axillary nodes normal  Neurological: Motor and sensory examination equal and normal. A and O x 3  Psychiatry: Calm.    LABS:    01-12    138  |  87<L>  |  24<H>  ----------------------------<  178<H>  4.2   |  43<H>  |  1.19    01-11    136  |  86<L>  |  24<H>  ----------------------------<  277<H>  3.6   |  40<H>  |  1.25    Ca      9.4      01-12    Ca      9.7      01-11      Mg       2.0     01-12    Mg       1.7     01-09    TPro  6.9  /  Alb  4.2  /  TBili  0.4  /  DBili  x   /  AST  20  /  ALT  45  /  AlkPhos  56  01-09    ABG - ( 11 Jan 2019 06:44 )  pH: 7.38  /  pCO2: 79    /  pO2: 117   / HCO3: 45    / Base Excess: 17.5  /  SaO2: 98        ABG - ( 06 Jan 2019 06:44 )  pH: 7.39  /  pCO2: 68    /  pO2: 99    / HCO3: 40    / Base Excess: 13.3  /  SaO2: 98        ABG - ( 03 Jan 2019 06:40 )  pH: 7.44  /  pCO2: 65    /  pO2: 152   / HCO3: 43    / Base Excess: 15.9  /  SaO2: 97        ABG - ( 01 Jan 2019 03:44 )  pH: 7.42  /  pCO2: 69    /  pO2: 165   / HCO3: 44    / Base Excess: 17.3  /  SaO2: 97        ABG - ( 31 Dec 2018 16:19 )  pH: 7.38  /  pCO2: 84    /  pO2: 26    / HCO3: 48    / Base Excess: 20.2  /  SaO2: 41        ABG - ( 18 Dec 2018 13:23 )  pH: 7.46  /  pCO2: 47    /  pO2: 88    / HCO3: 33    / Base Excess: 8.0   /  SaO2: 97        ABG - ( 16 Dec 2018 20:53 )  pH: 7.44  /  pCO2: 53    /  pO2: 173   / HCO3: 36    / Base Excess: 10.0  /  SaO2: 100       ABG - ( 13 Dec 2018 06:29 )  pH: 7.30  /  pCO2: 71    /  pO2: 182   / HCO3: 34    / Base Excess: 5.8   /  SaO2: 99        ABG - ( 13 Dec 2018 00:59 )  pH: 7.32  /  pCO2: 71    /  pO2: 75    / HCO3: 35    / Base Excess: 7.1   /  SaO2: 95        ABG - ( 11 Dec 2018 11:45 )  pH: 7.39  /  pCO2: 54    /  pO2: 98    / HCO3: 32    / Base Excess: 6.2   /  SaO2: 98        ABG - ( 09 Dec 2018 11:26 )  pH: 7.35  /  pCO2: 63    /  pO2: 145   / HCO3: 34    / Base Excess: 6.6   /  SaO2: 99      ABG - ( 09 Dec 2018 00:28 )  pH: 7.32  /  pCO2: 71    /  pO2: 124   / HCO3: 36    / Base Excess: 7.6   /  SaO2: 99        ABG - ( 08 Dec 2018 20:50 )  pH: 7.32  /  pCO2: 72    /  pO2: 80    / HCO3: 36    / Base Excess: 7.7   /  SaO2: 95        Serum Pro-Brain Natriuretic Peptide: 254 pg/mL (12-13 @ 14:00)    < from: Transthoracic Echocardiogram (12.07.18 @ 08:59) >    Patient name: GUY HARTMAN  YOB: 1960   Age: 58 (F)   MR#: [id number]  Study Date: 12/7/2018  Location: [address] [place], [place] [id number]: [facility]  Study quality: Technically good  Referring Physician: Allen ingram MD  BloodPressure: 120/80 mmHg  Height: 163 cm  Weight: 88 kg  BSA: 1.9 m2  ------------------------------------------------------------------------  PROCEDURE: Transthoracic echocardiogram with 2-D, M-Mode  and complete spectral and color flow Doppler.  INDICATION: Other forms of dyspnea (R06.09)  ------------------------------------------------------------------------  Dimensions:    Normal Values:  LA:     3.6    2.0 - 4.0 cm  Ao:     2.9    2.0 - 3.8 cm  SEPTUM: 0.9    0.6 - 1.2 cm  PWT:    0.8    0.6- 1.1 cm  LVIDd:  4.9    3.0 - 5.6 cm  LVIDs:  2.9    1.8 - 4.0 cm  Derived variables:  LVMI: 73 g/m2  RWT: 0.32  Fractional short: 40 %  EF (Teicholtz): 71 %  Doppler Peak Velocity (m/sec): AoV=1.2  ------------------------------------------------------------------------  Observations:  Mitral Valve: Normal mitral valve.  Aortic Valve/Aorta: Normal trileaflet aortic valve. Peak  transaortic valve gradient equals 6 mm Hg, mean transaortic  valve gradient equals 3 mm Hg, aortic valve velocity time  integral equals 22 cm. Trace aortic regurgitation.  Aortic Root: 2.9 cm.  Left Atrium: Normal left atrium.  LA volume index = 18  cc/m2.  Left Ventricle: Normal left ventricular systolic function.  No segmental wall motion abnormalities. Normal left  ventricular internal dimensions and wall thicknesses. Mild  diastolic dysfunction (Stage I).  Right Heart: Normal right atrium. Normal right ventricular  size and function. Normal tricuspid valve. Minimal  tricuspid regurgitation. Normal pulmonic valve.  Pericardium/Pleura: Normal pericardium with no pericardial  effusion.  Hemodynamic: Estimated right atrial pressure is 8 mm Hg.  Inadequate tricuspid regurgitation Doppler envelope  precludes estimation of RVSP.  ------------------------------------------------------------------------  Conclusions:  1. Normal mitral valve.  2. Normal trileaflet aortic valve. Peak transaortic valve  gradient equals 6 mm Hg, mean transaortic valve gradient  equals 3 mm Hg, aortic valve velocity time integral equals  22 cm. Trace aortic regurgitation.  3. Aortic Root: 2.9 cm.  4. Normal left atrium.  LA volume index = 18 cc/m2.  5. Normal left ventricular internal dimensions and wall  thicknesses.  6. Normal left ventricular systolic function. No segmental  wall motion abnormalities.  7. Mild diastolic dysfunction (Stage I).  8. Normal right ventricular size and function.  9. Inadequate tricuspid regurgitation Doppler envelope  precludes estimation of RVSP.  10. Normal tricuspid valve. Minimal tricuspid  regurgitation.  *** Compared with echocardiogram report of 2/18/2014, no  significant changes noted.  ------------------------------------------------------------------------  Confirmed on  12/7/2018 - 13:49:43 by Shefali Gómez M.D.  ------------------------------------------------------------------------    < end of copied text >  ------------------------------------------------------------------------------------------------  MICROBIOLOGY:     Culture - Sputum . (12.07.18 @ 08:34)    Gram Stain:   Rare polymorphonuclear leukocytes per low power field  Rare Squamous epithelial cells per low power field  Numerous Gram Positive Cocci in Pairs and Chains per oil power field    Specimen Source: .Sputum Sputum    Culture Results:   Normal Respiratory Samaria present    Culture - Sputum . (12.05.18 @ 22:50)    Gram Stain:   Moderate polymorphonuclear leukocytes per low power field  Few Squamous epithelial cells per low power field  Moderate Gram Positive Cocci in Pairs and Chains per oil power field    Specimen Source: .Sputum Sputum    Culture Results:   Normal Respiratory Samaria present    Rapid Respiratory Viral Panel (11.30.18 @ 01:30)    Rapid RVP Result: NotDete: The [employer] RVP Rapid uses polymerase chain reaction (PCR) and melt  curve analysis to screen for adenovirus; coronavirus HKU1, NL63, 229E,  OC43; human metapneumovirus (hMPV); human enterovirus/rhinovirus  (Entero/RV); influenza A; influenza A/H1;influenza A/H3; influenza  A/H1-2009; influenza B; parainfluenza viruses 1, 2, 3, 4; respiratory  syncytial virus; Bordetella pertussis; Mycoplasma pneumoniae; and  Chlamydophila pneumoniae.    RADIOLOGY:  [x] Chest radiographs reviewed and interpreted by me    < from: Xray Chest 1 View- PORTABLE-Urgent (01.05.19 @ 17:56) >    EXAM:  XR CHEST PORTABLE URGENT 1V                          PROCEDURE DATE:  01/05/2019      INTERPRETATION:  CLINICAL INFORMATION: Shortness of breath.    EXAM: Frontal radiograph of the chest.    COMPARISON: Chest radiograph from 12/25/2018    FINDINGS:  Heart size is normal.    The lungs are clear. No pneumothorax or pleural effusions.    Visualized osseous structures are unremarkable.    IMPRESSION: Clear lungs.    ADAM VAUGHN M.D., RADIOLOGY RESIDENT  This document has been electronically signed.  JEYSON SANCHEZ M.D., ATTENDING RADIOLOGIST  This document has been electronically signed. Jan 7 2019  3:28PM      < end of copied text >  ---------------------------------------------------------------------------------------------------------------    < from: [facility] Duplex Lower Ext Vein Scan, Darius (12.30.18 @ 19:06) >    EXAM:  DUPLEX SCAN EXT VEINS LOWER BI                          PROCEDURE DATE:  12/30/2018      INTERPRETATION:  Clinical indication: Worsening edema.    Technique:  Grayscale, color Doppler and spectral Doppler ultrasound was   utilized toevaluate bilateral lower extremity deep venous system.      Comparison: 12/6/2018.    Findings: There is no thrombosis in bilateral common femoral veins,   femoral veins or popliteal veins. Visualized calf veins are patent. There   is bilateral lowerextremity soft tissue edema.    Impression:     No evidence of deep vein thrombosis in either lower extremity.    BROCK TOBIN M.D., ATTENDING RADIOLOGIST  This document has been electronically signed. Dec 30 2018  7:24PM     < end of copied text >  ---------------------------------------------------------------------------------------------------------------  < from: VA Duplex Upper Ext Vein Scan, Darius (12.30.18 @ 19:05) >    EXAM:  DUPLEX SCAN EXT VEINS UPPER BI                          PROCEDURE DATE:  12/30/2018      INTERPRETATION:  Clinical information: Worsening bilateral upper   extremity edema.    Findings: Duplex evaluation of the deep venous system of the right and   left upper extremity was performed to include the brachiocephalic,   internal jugular, subclavian, axillary, brachial, radial and ulnar veins.   No echogenic thrombus is seen within the vein lumina. Complete coaptation   of those segments of vein accessible to compression was achieved.   Spontaneous venous flow with respiratory and cardiac pulsatility is noted   throughout.     The right innominate vein is patent. The left innominate vein was not   visualized.     The right and left basilic and cephalic veins are patent and compressible.    Impression: No duplex evidence of DVT in the right and left upper   extremity.    RAYMUNDO DUMONT M.D., ATTENDING RADIOLOGIST  This document has been electronically signed. Dec 31 2018  8:49AM     < end of copied text >  ---------------------------------------------------------------------------------------------------------------  < from: CT Angio Chest w/ IV Cont (12.04.18 @ 16:30) >    EXAM:  CT ANGIO CHEST (W)AW IC                          PROCEDURE DATE:  12/04/2018      INTERPRETATION:  CT CHEST WITH CONTRAST    INDICATION: Known COPD not responding to steroids and bronchodilators.   Progressively worsening dyspnea. Evaluate for pulmonary embolism.    IMPRESSION:   1.  No pulmonary embolism.  2. Emphysematous changes of the lung.    DIANA MEDINA M.D., RADIOLOGY RESIDENT  This document has been electronically signed.  JEYSON SANCHEZ M.D., ATTENDING RADIOLOGIST  This document has been electronically signed. Dec  4 2018  5:21PM      < end of copied text >  ---------------------------------------------------------------------------------------------------------------  SPIROMETRY:     FEV1 0.56 liters - 22% predicted  FVC 1.73 liters - 52% predicted  FEV1% 32

## 2019-01-14 NOTE — PROGRESS NOTE ADULT - PROBLEM SELECTOR PLAN 5
-A1C 7.2  -c/w Lantus 10 units QHS  -c/w Humalog  4-4-3 w/meals   -Continue to monitor blood sugars.

## 2019-01-14 NOTE — PROGRESS NOTE ADULT - ASSESSMENT
ASSESSMENT:    atrial arrythmias with RVR in the setting of severe lung disease, long-term beta-agonist use, elevated sympathetic tone due to critical illness and anxiety - s/p successful DCCV    ongoing but improving dyspnea with exertion with chronic hypoxic and hypercapnic respiratory failure due to very severe COPD with progression of disease - adequate oxygenation on a nasal canula in the setting of dyspnea suggests that alterations in the mechanics of breathing due to lung hyperinflation and obesity are likely playing a significant role in her shortness of breath - anxiety is also playing a role - there is evidence of CAD without pulmonary hypertension on the CT scan which is without pulmonary emboli or pneumonia - cardiac catheterization last year revealed patent stents - ECHO has a preserved LVEF, no significant valvular heart disease and no pulmonary hypertension - resolved AURORA, hyperkalemia and hyponatremia - resolved respiratory acidosis on repeat ABG - lower extremity weakness likely due to steroid induced myopathy perhaps exacerbated by statins is much improved    extremity edema/discomfort likely related to steroid induced fluid retention - no evidence of DVT on repeat upper or lower extremity Duplex examination - resolved upper extremity edema - improving lower extremity edema with bedrest - feet now with decreased swelling, erythema and pain with bullae     HTN/HLD/DM    CAD s/p PCI x 6    PAD    PLAN/RECOMMENDATIONS:    BIPAP 12/5 with 40% FiO2 for sleep - on during the day only as a last resort for increased work of breathing or recurrent respiratory acidosis - she has weaned off daytime BIPAP in anticipation of transfer to an acute rehabilitation center  oxygen supplementation to keep saturation greater than 92% using a nasal canula when off BIPAP  following ABG  sputum culture - no growth x2 - has completed a course of biaxin  home trilogy vent has been arranged with [facility]   xopenex/atrovent nebs q6h   pulmicort 0.5mg nebs q12h  singulair/theophylline - theophylline level is subtherapeutic - daliresp discontinued  prednisone 10mg daily - decrease by 5mg every 5 days - glucose control - watch for thrush off nystatin  azithromycin TIW for antiinflammatory effects  cardiology and EP service recommendations noted - off amiodarone and beta blockers  cardiac meds: ASA/eliquis/imdur/lasix/diltiazem CD/zocor (despite a possible statin related myopathy) - off norvasc due to swelling - BP control adequate  diurese as tolerated by renal function and hemodynamics - watch for worsening metabolic alkalosis with loop diuretic use which could lead to worsening hypercapnia  GI/DVT prophylaxis - protoix/eliquis  physical therapy daily - wound care  leg elevation  transfer to acute rehab   outpatient evaluation for lung transplantation    Will follow with you. Plan of care discussed with the patient at bedside, the dedicated floor NP and the . I have reached out to the [facility] transplant team. The patient has been accepted to [facility] acute rehab where they will be able to evaluate the patient for possible lung transplantation and perform appropriate testing as needed.    David Fair MD  Pulmonary Medicine  [phone]

## 2019-01-14 NOTE — PROGRESS NOTE ADULT - PROBLEM SELECTOR PLAN 8
dvt ppx: on apixaban  dispo: transfer to [facility] acute rehab. Social work following. Patient is medically stable for discharge to acute rehab when bed is available.

## 2019-01-14 NOTE — PROGRESS NOTE ADULT - SUBJECTIVE AND OBJECTIVE BOX
Mike Sandoval M.D. Pager Number [phone]    Patient is a 58y old  Female who presents with a chief complaint of dyspnea (14 Jan 2019 10:32)      SUBJECTIVE / OVERNIGHT EVENTS:  Pt seen and examined at bedside. No acute events overnight.  Pt denies cp, sob, palpitations, abd pain, N/V, fever, chills.    ROS:  All other review of systems negative    Allergies    No Known Allergies    Intolerances    albuterol (Unknown)      MEDICATIONS  (STANDING):  apixaban 5 milliGRAM(s) Oral every 12 hours  artificial tears (preservative free) Ophthalmic Solution 1 Drop(s) Both EYES three times a day  aspirin enteric coated 81 milliGRAM(s) Oral daily  azithromycin   Tablet 250 milliGRAM(s) Oral <User Schedule>  BACItracin   Ointment 1 Application(s) Topical daily  Biotene Dry Mouth Oral Rinse 5 milliLiter(s) Swish and Spit two times a day  bisacodyl Suppository 10 milliGRAM(s) Rectal once  buDESOnide   0.5 milliGRAM(s) Respule 0.5 milliGRAM(s) Inhalation every 12 hours  calamine Lotion 1 Application(s) Topical two times a day  cholecalciferol 2000 Unit(s) Oral daily  dextrose 5%. 1000 milliLiter(s) (50 mL/Hr) IV Continuous <Continuous>  dextrose 50% Injectable 12.5 Gram(s) IV Push once  dextrose 50% Injectable 25 Gram(s) IV Push once  dextrose 50% Injectable 25 Gram(s) IV Push once  diltiazem    milliGRAM(s) Oral daily  docusate sodium 100 milliGRAM(s) Oral three times a day  furosemide    Tablet 40 milliGRAM(s) Oral daily  hydrocortisone 1% Cream 1 Application(s) Topical daily  insulin glargine Injectable (LANTUS) 8 Unit(s) SubCutaneous at bedtime  insulin lispro (HumaLOG) corrective regimen sliding scale   SubCutaneous three times a day before meals  insulin lispro (HumaLOG) corrective regimen sliding scale   SubCutaneous at bedtime  insulin lispro Injectable (HumaLOG) 4 Unit(s) SubCutaneous before breakfast  insulin lispro Injectable (HumaLOG) 3 Unit(s) SubCutaneous with dinner  insulin lispro Injectable (HumaLOG) 4 Unit(s) SubCutaneous before lunch  ipratropium    for Nebulization 500 MICROGram(s) Nebulizer every 6 hours  isosorbide   mononitrate ER Tablet (IMDUR) 30 milliGRAM(s) Oral daily  levalbuterol Inhalation 0.63 milliGRAM(s) Inhalation every 6 hours  melatonin 1 milliGRAM(s) Oral at bedtime  montelukast 10 milliGRAM(s) Oral daily  multivitamin 1 Tablet(s) Oral daily  pantoprazole    Tablet 40 milliGRAM(s) Oral before breakfast  polyethylene glycol 3350 17 Gram(s) Oral daily  predniSONE   Tablet   Oral   predniSONE   Tablet 5 milliGRAM(s) Oral daily  senna 2 Tablet(s) Oral at bedtime  simethicone 80 milliGRAM(s) Chew once  simvastatin 10 milliGRAM(s) Oral at bedtime  theophylline ER Capsule 400 milliGRAM(s) Oral daily    MEDICATIONS  (PRN):  aluminum hydroxide/magnesium hydroxide/simethicone Suspension 30 milliLiter(s) Oral every 6 hours PRN Dyspepsia  dextrose 40% Gel 15 Gram(s) Oral once PRN Blood Glucose LESS THAN 70 milliGRAM(s)/deciliter  glucagon  Injectable 1 milliGRAM(s) IntraMuscular once PRN Glucose LESS THAN 70 milligrams/deciliter  ondansetron Injectable 4 milliGRAM(s) IV Push every 6 hours PRN Nausea and/or Vomiting  petrolatum Ophthalmic Ointment 1 Application(s) Both EYES at bedtime PRN dry eyes  sodium chloride 0.65% Nasal 1 Spray(s) Both Nostrils two times a day PRN Nasal Congestion      Vital Signs Last 24 Hrs  T(C): 36.9 (13 Jan 2019 16:10), Max: 36.9 (13 Jan 2019 16:10)  T(F): 98.4 (13 Jan 2019 16:10), Max: 98.4 (13 Jan 2019 16:10)  HR: 72 (14 Jan 2019 11:17) (71 - 86)  BP: 158/74 (13 Jan 2019 16:10) (158/74 - 158/74)  BP(mean): --  RR: 18 (13 Jan 2019 16:10) (18 - 18)  SpO2: 100% (14 Jan 2019 11:17) (94% - 100%)  CAPILLARY BLOOD GLUCOSE      POCT Blood Glucose.: 134 mg/dL (14 Jan 2019 09:32)  POCT Blood Glucose.: 220 mg/dL (13 Jan 2019 22:30)  POCT Blood Glucose.: 145 mg/dL (13 Jan 2019 18:12)  POCT Blood Glucose.: 283 mg/dL (13 Jan 2019 13:13)    I&O's Summary      PHYSICAL EXAM:  GENERAL: NAD, well-developed  HEAD:  Atraumatic, Normocephalic  EYES: EOMI, PERRLA, conjunctiva and sclera clear  NECK: Supple, No JVD  CHEST/LUNG: Decreased breath sounds, No wheezing. Supplemental O2 with NC  HEART: Regular rate and rhythm; No murmurs, rubs, or gallops  ABDOMEN: Soft, Nontender, Nondistended; Bowel sounds present  EXTREMITIES:  2+ Peripheral Pulses, No clubbing, cyanosis, or edema  NEUROLOGY: AAOx3, non-focal  PSYCH: Irritable   SKIN: No rashes or lesions

## 2019-01-14 NOTE — PROGRESS NOTE ADULT - SUBJECTIVE AND OBJECTIVE BOX
Diabetes Follow up note: Saw pt earlier today  Interval Hx: 57 y/o F w/h/o controlled T2DM on Metformin 500mg bid. Also h/o CAD and COPD. Here with COPD exacerbation>started prednisone 5mg today until 1/19. BG values variable depending on PO intake and steroid dose. 100s most of the time with some BG >200s. Noted ac lunch BG in 400s today and also elevated yesterday at the same time. Also FBG dropping from 220 last night to 134 this am. No hypoglycemia. Pt still c/o not been able to breath but has PO2 of 100%. Possible discharge to respiratory rehab once cleared by insurance.      Review of Systems:  General: + foot pain/blisters but states edema is almost gone  GI: Tolerating POs without any N/V/D/ABD PAIN.  CV: No CP/SOB  ENDO: No S&Sx of hypoglycemia      MEDS:  insulin glargine Injectable (LANTUS) 10 Unit(s) SubCutaneous at bedtime  insulin lispro (HumaLOG) corrective regimen sliding scale   SubCutaneous three times a day before meals  insulin lispro (HumaLOG) corrective regimen sliding scale   SubCutaneous at bedtime  insulin lispro Injectable (HumaLOG) 4 Unit(s) SubCutaneous before breakfast  insulin lispro Injectable (HumaLOG) 3 Unit(s) SubCutaneous with dinner  insulin lispro Injectable (HumaLOG) 4 Unit(s) SubCutaneous before lunch   simvastatin 10 milliGRAM(s) Oral at bedtime  azithromycin   Tablet 250 milliGRAM(s) Oral <User Schedule>  BACItracin   Ointment 1 Application(s) Topical daily  cholecalciferol 2000 Unit(s) Oral daily  hydrocortisone 1% Cream 1 Application(s) Topical daily  predniSONE   Tablet 5 milliGRAM(s) Oral daily  theophylline ER Capsule 400 milliGRAM(s) Oral daily      Allergies    No Known Allergies    Intolerances    albuterol (Unknown)    PE:  General: Female lying in bed inNAD.   Vital Signs Last 24 Hrs  T(C): --  T(F): --  HR: 72 (01-14-19 @ 11:17) (71 - 78)  BP: --  BP(mean): --  RR: --  SpO2: 100% (01-14-19 @ 11:17) (94% - 100%)  Abd: Soft, NT, ND, Obese.   Extremities: Warm. B/L LE edema improved. B/L Foot dsg c/d/i  Neuro: A&O X3    LABS:  POCT Blood Glucose.: 407 mg/dL (01-14-19 @ 13:06)  POCT Blood Glucose.: 134 mg/dL (01-14-19 @ 09:32)  POCT Blood Glucose.: 220 mg/dL (01-13-19 @ 22:30)  POCT Blood Glucose.: 145 mg/dL (01-13-19 @ 18:12)  POCT Blood Glucose.: 283 mg/dL (01-13-19 @ 13:13)  POCT Blood Glucose.: 174 mg/dL (01-13-19 @ 09:36)  POCT Blood Glucose.: 128 mg/dL (01-12-19 @ 22:38)  POCT Blood Glucose.: 205 mg/dL (01-12-19 @ 18:41)  POCT Blood Glucose.: 241 mg/dL (01-12-19 @ 17:33)                            9.7    10.9  )-----------( 390      ( 12 Jan 2019 07:00 )             29.0           01-12    138  |  87<L>  |  24<H>  ----------------------------<  178<H>  4.2   |  43<H>  |  1.19    Ca    9.4      12 Jan 2019 07:00  Mg     2.0     01-12        Hemoglobin A1C, Whole Blood: 7.2 % <H> [4.0 - 5.6] (11-30-18 @ 07:58)

## 2019-01-14 NOTE — PROGRESS NOTE ADULT - PROBLEM SELECTOR PLAN 1
-test BG AC/HS  -decrease Lantus dose to 8  units QHS  -change Humalog to 5-4-3 w/meals for now. May need adjustment on lowered prednisone dose but would follow up since today BG when up instead of down  -c/w humalog low correction scale AC and Low HS scale  discharge plan: restart Metformin 500mg bid if off steroids  -Plan discussed with pt/team. If discharge to rehab on steroid will continue basal/bolus therapy. Doses TBD  Contact info: [phone] (24/7). pager [phone]

## 2019-01-14 NOTE — CHART NOTE - NSCHARTNOTEFT_GEN_A_CORE
Nutrition follow up     Hospital course as per chart: Pt 59 y/o F with PMH: anxiety, claustrophobia, Raynaud phenomenon, COPD on home O2 3L 24/7 days, T2DM, PVD, HTN, HLD, CAD S/P x6 stents, admitted with progressive worsening dyspnea, COPD exacerbation, acute on chronic hypoxic/hypercarbic respiratory failure, new onset A-fib RVR S/P successful CV, danya. feet edema, plans to discharge to acute pulmonary rehab.    Source: Patient [x]    Family [ ]     other [x]; Medical record    Pt reports good appetite and PO intake. Noted % PO intake as per flow sheets. Reports drinking Glucerna QD. Denies difficulty chewing/swallowing. Pt denies nausea, vomiting, diarrhea, or constipation, reports last BM today (01/14) - pt on bowel regimen as per chart. Noted pt received nutrition therapy education as per previous RD notes - pt denies having questions/concerns about diet and nutrition education provided.     Diet: Consistent Carbohydrate with snack + Glucerna Shake 240mls 1x daily (220kcals, 10g protein).     Enteral /Parenteral Nutrition: n/a    Current Weight: (11/29/2018) 195.3 pounds -> (12/19/2018) 207 pounds -?accuracy of weight fluctuations likely due to fluid shifts, no new weight documented.   % Weight Change: n/a    Pertinent Medications: MEDICATIONS  (STANDING):  apixaban 5 milliGRAM(s) Oral every 12 hours  artificial tears (preservative free) Ophthalmic Solution 1 Drop(s) Both EYES three times a day  aspirin enteric coated 81 milliGRAM(s) Oral daily  azithromycin   Tablet 250 milliGRAM(s) Oral <User Schedule>  BACItracin   Ointment 1 Application(s) Topical daily  Biotene Dry Mouth Oral Rinse 5 milliLiter(s) Swish and Spit two times a day  bisacodyl Suppository 10 milliGRAM(s) Rectal once  buDESOnide   0.5 milliGRAM(s) Respule 0.5 milliGRAM(s) Inhalation every 12 hours  calamine Lotion 1 Application(s) Topical two times a day  cholecalciferol 2000 Unit(s) Oral daily  dextrose 5%. 1000 milliLiter(s) (50 mL/Hr) IV Continuous <Continuous>  dextrose 50% Injectable 12.5 Gram(s) IV Push once  dextrose 50% Injectable 25 Gram(s) IV Push once  dextrose 50% Injectable 25 Gram(s) IV Push once  diltiazem    milliGRAM(s) Oral daily  docusate sodium 100 milliGRAM(s) Oral three times a day  furosemide    Tablet 40 milliGRAM(s) Oral daily  hydrocortisone 1% Cream 1 Application(s) Topical daily  insulin glargine Injectable (LANTUS) 8 Unit(s) SubCutaneous at bedtime  insulin lispro (HumaLOG) corrective regimen sliding scale   SubCutaneous three times a day before meals  insulin lispro (HumaLOG) corrective regimen sliding scale   SubCutaneous at bedtime  insulin lispro Injectable (HumaLOG) 4 Unit(s) SubCutaneous before breakfast  insulin lispro Injectable (HumaLOG) 3 Unit(s) SubCutaneous with dinner  insulin lispro Injectable (HumaLOG) 4 Unit(s) SubCutaneous before lunch  ipratropium    for Nebulization 500 MICROGram(s) Nebulizer every 6 hours  isosorbide   mononitrate ER Tablet (IMDUR) 30 milliGRAM(s) Oral daily  levalbuterol Inhalation 0.63 milliGRAM(s) Inhalation every 6 hours  melatonin 1 milliGRAM(s) Oral at bedtime  montelukast 10 milliGRAM(s) Oral daily  multivitamin 1 Tablet(s) Oral daily  pantoprazole    Tablet 40 milliGRAM(s) Oral before breakfast  polyethylene glycol 3350 17 Gram(s) Oral daily  predniSONE   Tablet   Oral   predniSONE   Tablet 5 milliGRAM(s) Oral daily  senna 2 Tablet(s) Oral at bedtime  simethicone 80 milliGRAM(s) Chew once  simvastatin 10 milliGRAM(s) Oral at bedtime  theophylline ER Capsule 400 milliGRAM(s) Oral daily    MEDICATIONS  (PRN):  aluminum hydroxide/magnesium hydroxide/simethicone Suspension 30 milliLiter(s) Oral every 6 hours PRN Dyspepsia  dextrose 40% Gel 15 Gram(s) Oral once PRN Blood Glucose LESS THAN 70 milliGRAM(s)/deciliter  glucagon  Injectable 1 milliGRAM(s) IntraMuscular once PRN Glucose LESS THAN 70 milligrams/deciliter  ondansetron Injectable 4 milliGRAM(s) IV Push every 6 hours PRN Nausea and/or Vomiting  petrolatum Ophthalmic Ointment 1 Application(s) Both EYES at bedtime PRN dry eyes  sodium chloride 0.65% Nasal 1 Spray(s) Both Nostrils two times a day PRN Nasal Congestion    Pertinent Labs: (01/12) Glu 178 mg/dL<H> BUN 24 mg/dL<H>   Finger sticks: (01/14) 134-407 (01/13) 145-283   (11/30/2018) HbA1c 7.2%     Skin: wound in left metatarsals; no noted pressure injuries as per documentation.   Noted +4 danya. leg edema as per flow sheets.     Estimated Needs:   [x] no change since previous assessment  [ ] recalculated:     Previous Nutrition Diagnosis: [x] Increased nutrient needs     Nutrition Diagnosis is [x] ongoing - being addressed with nutritional supplementation as above and PO intake encouragement.     New Nutrition Diagnosis: [x] not applicable    Interventions:     1. Recommend continue current diet and Glucerna as above.   2. Encourage PO intake, obtain food preferences, provide feeding assistance as needed.   3. Continue Multivitamin to optimize nutrient intake.   4. Review nutrition therapy education as needed/requested.   5. Obtain current weight to identify changes if any.     Monitoring and Evaluation:     [x] PO intake [x] Tolerance to diet prescription [x] weights [x] follow up per protocol    RD remains available.  Tiffanie Tang RDN, #[phone]

## 2019-01-14 NOTE — PROGRESS NOTE ADULT - PROBLEM SELECTOR PLAN 1
-A fib with RVR resolved s/p successful CV and Amiodarone  -Continue Cardizem 240mg CR po qd  -Continue Eliquis  -monitor on tele

## 2019-01-14 NOTE — PROGRESS NOTE ADULT - ASSESSMENT
58 F PMH COPD on home O2 3L, HTN, HLD, CAD s/p x6 stents, T2DM, pHTN, PVD, p/w progressive worsening dyspnea x 2 weeks now complicated by chronic progressive hypoxic respiratory failure.     1. New onset AFIB  S/P successful DCCV, remains NSR    EVAN with no evidence of atrial thrombus   continue Cardizem   no longer on amiodarone given lung disease  CHADSVASC 4 continue Eliquis 5mg PO BID  EP f/u     2. Chronic progressive hypoxic respiratory failure  multifactorial in the setting of worsening COPD, CAD, chronic diastolic CHF, pulmonary HTN   LE edema stable, continue lasix 40mg PO daily   UE/ LE dopplers negative for DVT   echo with normal LV function, mild diastolic dysfunction, inadequate tricuspid regurg   cv stable no chest pain or sob   bipap PRN, pulm f/u   EKG reviewed, corrected QT WNL (aprox 462), on azithromycin    3. CAD s/p PCI  cv stable, continue asa, imdur    4. COPD   on bipap prn, management per pulm     5. hyponatremia/hyperkalemia  trend bmp, med f/u    dvt ppx   dc planning per primary team

## 2019-01-14 NOTE — PROGRESS NOTE ADULT - ASSESSMENT
57 y/o F w/h/o controlled T2DM on Metformin 500mg bid. Also h/o CAD and COPD now on 1st day of prednisone 5 mg daily til 1/19/19. BG values variable depending on PO intake with persistent hyperglycemia ac lunch time. Also dropping BG levels from night to morning. Will decrease Lantus dose while on lower Prednisone dose but increase breakfast time humalog. BG goal (100-180mg/dl). Pt awaiting transfer to [facility].

## 2019-01-14 NOTE — PROGRESS NOTE ADULT - SUBJECTIVE AND OBJECTIVE BOX
CARDIOLOGY FOLLOW UP - Dr. Brunson    CC no cp or increase SOB       PHYSICAL EXAM:  T(C): 36.9 (01-13-19 @ 16:10), Max: 36.9 (01-13-19 @ 16:10)  HR: 78 (01-14-19 @ 05:45) (71 - 86)  BP: 158/74 (01-13-19 @ 16:10) (158/74 - 158/74)  RR: 18 (01-13-19 @ 16:10) (18 - 18)  SpO2: 94% (01-14-19 @ 05:45) (94% - 98%)  Wt(kg): --  I&O's Summary      Appearance: Normal	  Cardiovascular: Normal S1 S2,RRR, No JVD, No murmurs  Respiratory: Diminished   Gastrointestinal:  Soft, Non-tender, + BS	  Extremities: Normal range of motion, No clubbing, cyanosis or edema  bl feet dressing CDI       MEDICATIONS  (STANDING):  apixaban 5 milliGRAM(s) Oral every 12 hours  artificial tears (preservative free) Ophthalmic Solution 1 Drop(s) Both EYES three times a day  aspirin enteric coated 81 milliGRAM(s) Oral daily  azithromycin   Tablet 250 milliGRAM(s) Oral <User Schedule>  BACItracin   Ointment 1 Application(s) Topical daily  Biotene Dry Mouth Oral Rinse 5 milliLiter(s) Swish and Spit two times a day  bisacodyl Suppository 10 milliGRAM(s) Rectal once  buDESOnide   0.5 milliGRAM(s) Respule 0.5 milliGRAM(s) Inhalation every 12 hours  calamine Lotion 1 Application(s) Topical two times a day  cholecalciferol 2000 Unit(s) Oral daily  dextrose 5%. 1000 milliLiter(s) (50 mL/Hr) IV Continuous <Continuous>  dextrose 50% Injectable 12.5 Gram(s) IV Push once  dextrose 50% Injectable 25 Gram(s) IV Push once  dextrose 50% Injectable 25 Gram(s) IV Push once  diltiazem    milliGRAM(s) Oral daily  docusate sodium 100 milliGRAM(s) Oral three times a day  furosemide    Tablet 40 milliGRAM(s) Oral daily  hydrocortisone 1% Cream 1 Application(s) Topical daily  insulin glargine Injectable (LANTUS) 8 Unit(s) SubCutaneous at bedtime  insulin lispro (HumaLOG) corrective regimen sliding scale   SubCutaneous three times a day before meals  insulin lispro (HumaLOG) corrective regimen sliding scale   SubCutaneous at bedtime  insulin lispro Injectable (HumaLOG) 4 Unit(s) SubCutaneous before breakfast  insulin lispro Injectable (HumaLOG) 3 Unit(s) SubCutaneous with dinner  insulin lispro Injectable (HumaLOG) 4 Unit(s) SubCutaneous before lunch  ipratropium    for Nebulization 500 MICROGram(s) Nebulizer every 6 hours  isosorbide   mononitrate ER Tablet (IMDUR) 30 milliGRAM(s) Oral daily  levalbuterol Inhalation 0.63 milliGRAM(s) Inhalation every 6 hours  melatonin 1 milliGRAM(s) Oral at bedtime  montelukast 10 milliGRAM(s) Oral daily  multivitamin 1 Tablet(s) Oral daily  pantoprazole    Tablet 40 milliGRAM(s) Oral before breakfast  polyethylene glycol 3350 17 Gram(s) Oral daily  predniSONE   Tablet   Oral   predniSONE   Tablet 5 milliGRAM(s) Oral daily  senna 2 Tablet(s) Oral at bedtime  simethicone 80 milliGRAM(s) Chew once  simvastatin 10 milliGRAM(s) Oral at bedtime  theophylline ER Capsule 400 milliGRAM(s) Oral daily      TELEMETRY: NSR 	    ECG:  	  RADIOLOGY:   DIAGNOSTIC TESTING:  [ ] Echocardiogram:  [ ]  Catheterization:  [ ] Stress Test:    OTHER: 	    LABS:

## 2019-01-14 NOTE — PROGRESS NOTE ADULT - ASSESSMENT
59 yo F PMH COPD, chronic hypoxic resp failure on home O2 3L, HTN, HLD, CAD s/p 6 stents, dCHF, T2DM, PVD, p/w progressive worsening dyspnea x 2 weeks a/w COPD exacerbation, acute on chronic hypoxic/hypercarbic respiratory failure. With hosp course c/b new onset afib RVR s/p successful CV, and b/l feet edema. Dentures

## 2019-01-14 NOTE — PROGRESS NOTE ADULT - PROBLEM SELECTOR PLAN 2
-Chronic  -Continue Prednisone taper (slow taper) per pulm. Start Prednisone 5mg daily x 5 days today  -Continue Pulmicort & Duoneb nebs, Theophylline, Singulair  -Continue bipap at night. Patient does NOT require BIPAP.  -Plan for discharge to acute pulmonary rehab at [facility] pending insurance approval. Pulmonologist, Dr. Alfaro and  on board

## 2019-01-15 VITALS
OXYGEN SATURATION: 100 % | HEART RATE: 79 BPM | RESPIRATION RATE: 18 BRPM | SYSTOLIC BLOOD PRESSURE: 144 MMHG | DIASTOLIC BLOOD PRESSURE: 52 MMHG | TEMPERATURE: 99 F

## 2019-01-15 LAB
ANION GAP SERPL CALC-SCNC: 12 MMOL/L — SIGNIFICANT CHANGE UP (ref 5–17)
BUN SERPL-MCNC: 22 MG/DL — SIGNIFICANT CHANGE UP (ref 7–23)
CALCIUM SERPL-MCNC: 10 MG/DL — SIGNIFICANT CHANGE UP (ref 8.4–10.5)
CHLORIDE SERPL-SCNC: 89 MMOL/L — LOW (ref 96–108)
CO2 SERPL-SCNC: 39 MMOL/L — HIGH (ref 22–31)
CREAT SERPL-MCNC: 0.84 MG/DL — SIGNIFICANT CHANGE UP (ref 0.5–1.3)
GLUCOSE BLDC GLUCOMTR-MCNC: 143 MG/DL — HIGH (ref 70–99)
GLUCOSE BLDC GLUCOMTR-MCNC: 164 MG/DL — HIGH (ref 70–99)
GLUCOSE SERPL-MCNC: 180 MG/DL — HIGH (ref 70–99)
HCT VFR BLD CALC: 33.1 % — LOW (ref 34.5–45)
HGB BLD-MCNC: 11 G/DL — LOW (ref 11.5–15.5)
MCHC RBC-ENTMCNC: 29.5 PG — SIGNIFICANT CHANGE UP (ref 27–34)
MCHC RBC-ENTMCNC: 33.1 GM/DL — SIGNIFICANT CHANGE UP (ref 32–36)
MCV RBC AUTO: 89 FL — SIGNIFICANT CHANGE UP (ref 80–100)
PLATELET # BLD AUTO: 365 K/UL — SIGNIFICANT CHANGE UP (ref 150–400)
POTASSIUM SERPL-MCNC: 3.7 MMOL/L — SIGNIFICANT CHANGE UP (ref 3.5–5.3)
POTASSIUM SERPL-SCNC: 3.7 MMOL/L — SIGNIFICANT CHANGE UP (ref 3.5–5.3)
RBC # BLD: 3.72 M/UL — LOW (ref 3.8–5.2)
RBC # FLD: 15.7 % — HIGH (ref 10.3–14.5)
SODIUM SERPL-SCNC: 140 MMOL/L — SIGNIFICANT CHANGE UP (ref 135–145)
WBC # BLD: 13.5 K/UL — HIGH (ref 3.8–10.5)
WBC # FLD AUTO: 13.5 K/UL — HIGH (ref 3.8–10.5)

## 2019-01-15 PROCEDURE — 99239 HOSP IP/OBS DSCHRG MGMT >30: CPT

## 2019-01-15 PROCEDURE — 81003 URINALYSIS AUTO W/O SCOPE: CPT

## 2019-01-15 PROCEDURE — 80048 BASIC METABOLIC PNL TOTAL CA: CPT

## 2019-01-15 PROCEDURE — 94640 AIRWAY INHALATION TREATMENT: CPT

## 2019-01-15 PROCEDURE — 87798 DETECT AGENT NOS DNA AMP: CPT

## 2019-01-15 PROCEDURE — 85610 PROTHROMBIN TIME: CPT

## 2019-01-15 PROCEDURE — 83605 ASSAY OF LACTIC ACID: CPT

## 2019-01-15 PROCEDURE — 76770 US EXAM ABDO BACK WALL COMP: CPT

## 2019-01-15 PROCEDURE — 85730 THROMBOPLASTIN TIME PARTIAL: CPT

## 2019-01-15 PROCEDURE — 97165 OT EVAL LOW COMPLEX 30 MIN: CPT

## 2019-01-15 PROCEDURE — 93970 EXTREMITY STUDY: CPT

## 2019-01-15 PROCEDURE — 99285 EMERGENCY DEPT VISIT HI MDM: CPT | Mod: 25

## 2019-01-15 PROCEDURE — 82435 ASSAY OF BLOOD CHLORIDE: CPT

## 2019-01-15 PROCEDURE — 87581 M.PNEUMON DNA AMP PROBE: CPT

## 2019-01-15 PROCEDURE — 84300 ASSAY OF URINE SODIUM: CPT

## 2019-01-15 PROCEDURE — 83735 ASSAY OF MAGNESIUM: CPT

## 2019-01-15 PROCEDURE — 97530 THERAPEUTIC ACTIVITIES: CPT

## 2019-01-15 PROCEDURE — 83880 ASSAY OF NATRIURETIC PEPTIDE: CPT

## 2019-01-15 PROCEDURE — 36600 WITHDRAWAL OF ARTERIAL BLOOD: CPT

## 2019-01-15 PROCEDURE — 84133 ASSAY OF URINE POTASSIUM: CPT

## 2019-01-15 PROCEDURE — 97110 THERAPEUTIC EXERCISES: CPT

## 2019-01-15 PROCEDURE — 97162 PT EVAL MOD COMPLEX 30 MIN: CPT

## 2019-01-15 PROCEDURE — 82947 ASSAY GLUCOSE BLOOD QUANT: CPT

## 2019-01-15 PROCEDURE — 82570 ASSAY OF URINE CREATININE: CPT

## 2019-01-15 PROCEDURE — 84295 ASSAY OF SERUM SODIUM: CPT

## 2019-01-15 PROCEDURE — 71045 X-RAY EXAM CHEST 1 VIEW: CPT

## 2019-01-15 PROCEDURE — 84132 ASSAY OF SERUM POTASSIUM: CPT

## 2019-01-15 PROCEDURE — 80053 COMPREHEN METABOLIC PANEL: CPT

## 2019-01-15 PROCEDURE — 84484 ASSAY OF TROPONIN QUANT: CPT

## 2019-01-15 PROCEDURE — 93306 TTE W/DOPPLER COMPLETE: CPT

## 2019-01-15 PROCEDURE — 94660 CPAP INITIATION&MGMT: CPT

## 2019-01-15 PROCEDURE — 93325 DOPPLER ECHO COLOR FLOW MAPG: CPT

## 2019-01-15 PROCEDURE — 85027 COMPLETE CBC AUTOMATED: CPT

## 2019-01-15 PROCEDURE — 80198 ASSAY OF THEOPHYLLINE: CPT

## 2019-01-15 PROCEDURE — 83036 HEMOGLOBIN GLYCOSYLATED A1C: CPT

## 2019-01-15 PROCEDURE — 82010 KETONE BODYS QUAN: CPT

## 2019-01-15 PROCEDURE — 85014 HEMATOCRIT: CPT

## 2019-01-15 PROCEDURE — 93312 ECHO TRANSESOPHAGEAL: CPT

## 2019-01-15 PROCEDURE — 82803 BLOOD GASES ANY COMBINATION: CPT

## 2019-01-15 PROCEDURE — 94010 BREATHING CAPACITY TEST: CPT

## 2019-01-15 PROCEDURE — 71275 CT ANGIOGRAPHY CHEST: CPT

## 2019-01-15 PROCEDURE — 87070 CULTURE OTHR SPECIMN AEROBIC: CPT

## 2019-01-15 PROCEDURE — 93320 DOPPLER ECHO COMPLETE: CPT

## 2019-01-15 PROCEDURE — 96374 THER/PROPH/DIAG INJ IV PUSH: CPT

## 2019-01-15 PROCEDURE — 82962 GLUCOSE BLOOD TEST: CPT

## 2019-01-15 PROCEDURE — 87486 CHLMYD PNEUM DNA AMP PROBE: CPT

## 2019-01-15 PROCEDURE — 97116 GAIT TRAINING THERAPY: CPT

## 2019-01-15 PROCEDURE — 87633 RESP VIRUS 12-25 TARGETS: CPT

## 2019-01-15 PROCEDURE — 84100 ASSAY OF PHOSPHORUS: CPT

## 2019-01-15 PROCEDURE — 93005 ELECTROCARDIOGRAM TRACING: CPT

## 2019-01-15 PROCEDURE — 82330 ASSAY OF CALCIUM: CPT

## 2019-01-15 RX ORDER — CALAMINE AND ZINC OXIDE AND PHENOL 160; 10 MG/ML; MG/ML
1 LOTION TOPICAL
Qty: 0 | Refills: 0 | COMMUNITY
Start: 2019-01-15

## 2019-01-15 RX ORDER — BUDESONIDE, MICRONIZED 100 %
0.5 POWDER (GRAM) MISCELLANEOUS
Qty: 0 | Refills: 0 | COMMUNITY
Start: 2019-01-15

## 2019-01-15 RX ORDER — SENNA PLUS 8.6 MG/1
2 TABLET ORAL
Qty: 0 | Refills: 0 | COMMUNITY
Start: 2019-01-15

## 2019-01-15 RX ORDER — ALBUTEROL 90 UG/1
2 AEROSOL, METERED ORAL
Qty: 0 | Refills: 0 | COMMUNITY

## 2019-01-15 RX ORDER — DOCUSATE SODIUM 100 MG
1 CAPSULE ORAL
Qty: 0 | Refills: 0 | COMMUNITY
Start: 2019-01-15

## 2019-01-15 RX ORDER — POLYETHYLENE GLYCOL 3350 17 G/17G
17 POWDER, FOR SOLUTION ORAL
Qty: 0 | Refills: 0 | COMMUNITY
Start: 2019-01-15

## 2019-01-15 RX ORDER — TIOTROPIUM BROMIDE 18 UG/1
1 CAPSULE ORAL; RESPIRATORY (INHALATION)
Qty: 0 | Refills: 0 | COMMUNITY

## 2019-01-15 RX ORDER — AZITHROMYCIN 500 MG/1
1 TABLET, FILM COATED ORAL
Qty: 0 | Refills: 0 | COMMUNITY
Start: 2019-01-15

## 2019-01-15 RX ORDER — UBIDECARENONE 100 MG
0 CAPSULE ORAL
Qty: 0 | Refills: 0 | COMMUNITY

## 2019-01-15 RX ORDER — BACITRACIN ZINC 500 UNIT/G
1 OINTMENT IN PACKET (EA) TOPICAL
Qty: 0 | Refills: 0 | COMMUNITY
Start: 2019-01-15

## 2019-01-15 RX ORDER — LEVALBUTEROL 1.25 MG/.5ML
3 SOLUTION, CONCENTRATE RESPIRATORY (INHALATION)
Qty: 0 | Refills: 0 | COMMUNITY
Start: 2019-01-15

## 2019-01-15 RX ORDER — MONTELUKAST 4 MG/1
1 TABLET, CHEWABLE ORAL
Qty: 0 | Refills: 0 | COMMUNITY

## 2019-01-15 RX ORDER — SODIUM CHLORIDE 9 MG/ML
1000 INJECTION, SOLUTION INTRAVENOUS
Qty: 0 | Refills: 0 | COMMUNITY
Start: 2019-01-15

## 2019-01-15 RX ORDER — HYDROCORTISONE 1 %
1 OINTMENT (GRAM) TOPICAL
Qty: 0 | Refills: 0 | COMMUNITY
Start: 2019-01-15

## 2019-01-15 RX ORDER — IPRATROPIUM/ALBUTEROL SULFATE 18-103MCG
3 AEROSOL WITH ADAPTER (GRAM) INHALATION
Qty: 0 | Refills: 0 | COMMUNITY

## 2019-01-15 RX ORDER — LANOLIN ALCOHOL/MO/W.PET/CERES
1 CREAM (GRAM) TOPICAL
Qty: 0 | Refills: 0 | COMMUNITY
Start: 2019-01-15

## 2019-01-15 RX ADMIN — Medication 5 MILLIGRAM(S): at 08:04

## 2019-01-15 RX ADMIN — ISOSORBIDE MONONITRATE 30 MILLIGRAM(S): 60 TABLET, EXTENDED RELEASE ORAL at 12:44

## 2019-01-15 RX ADMIN — APIXABAN 5 MILLIGRAM(S): 2.5 TABLET, FILM COATED ORAL at 15:24

## 2019-01-15 RX ADMIN — Medication 500 MICROGRAM(S): at 11:38

## 2019-01-15 RX ADMIN — LEVALBUTEROL 0.63 MILLIGRAM(S): 1.25 SOLUTION, CONCENTRATE RESPIRATORY (INHALATION) at 11:38

## 2019-01-15 RX ADMIN — AZITHROMYCIN 250 MILLIGRAM(S): 500 TABLET, FILM COATED ORAL at 09:19

## 2019-01-15 RX ADMIN — Medication 400 MILLIGRAM(S): at 09:19

## 2019-01-15 RX ADMIN — Medication 100 MILLIGRAM(S): at 15:24

## 2019-01-15 RX ADMIN — Medication 4 UNIT(S): at 12:45

## 2019-01-15 RX ADMIN — Medication 5 UNIT(S): at 09:18

## 2019-01-15 RX ADMIN — Medication 0.5 MILLIGRAM(S): at 07:55

## 2019-01-15 RX ADMIN — APIXABAN 5 MILLIGRAM(S): 2.5 TABLET, FILM COATED ORAL at 06:04

## 2019-01-15 RX ADMIN — Medication 2000 UNIT(S): at 12:44

## 2019-01-15 RX ADMIN — Medication 1 TABLET(S): at 12:44

## 2019-01-15 RX ADMIN — MONTELUKAST 10 MILLIGRAM(S): 4 TABLET, CHEWABLE ORAL at 12:44

## 2019-01-15 RX ADMIN — Medication 1 APPLICATION(S): at 12:44

## 2019-01-15 RX ADMIN — Medication 100 MILLIGRAM(S): at 06:04

## 2019-01-15 RX ADMIN — Medication 240 MILLIGRAM(S): at 06:04

## 2019-01-15 RX ADMIN — Medication 1: at 09:18

## 2019-01-15 RX ADMIN — Medication 81 MILLIGRAM(S): at 12:44

## 2019-01-15 RX ADMIN — Medication 40 MILLIGRAM(S): at 06:04

## 2019-01-15 RX ADMIN — PANTOPRAZOLE SODIUM 40 MILLIGRAM(S): 20 TABLET, DELAYED RELEASE ORAL at 06:04

## 2019-01-15 RX ADMIN — Medication 500 MICROGRAM(S): at 07:54

## 2019-01-15 RX ADMIN — POLYETHYLENE GLYCOL 3350 17 GRAM(S): 17 POWDER, FOR SOLUTION ORAL at 12:44

## 2019-01-15 RX ADMIN — LEVALBUTEROL 0.63 MILLIGRAM(S): 1.25 SOLUTION, CONCENTRATE RESPIRATORY (INHALATION) at 07:55

## 2019-01-15 NOTE — PROGRESS NOTE ADULT - PROBLEM SELECTOR PROBLEM 6
Prophylactic measure
Coronary artery disease involving native heart without angina pectoris, unspecified vessel or lesion type
Hyperkalemia
Leg edema
Leg edema
Coronary artery disease involving native heart without angina pectoris, unspecified vessel or lesion type
Coronary artery disease involving native heart without angina pectoris, unspecified vessel or lesion type
Essential hypertension
Heart failure
Hyperkalemia
Leg edema
Leukocytosis
Nausea
Nausea
Prophylactic measure
Leg edema
Heart failure
Leg edema

## 2019-01-15 NOTE — PROGRESS NOTE ADULT - PROBLEM SELECTOR PROBLEM 5
Coronary artery disease involving native heart without angina pectoris, unspecified vessel or lesion type
Coronary artery disease involving native heart without angina pectoris, unspecified vessel or lesion type
Essential hypertension
Hyperkalemia
Hyperkalemia
Acute kidney injury
Acute kidney injury
Coronary artery disease involving native heart without angina pectoris, unspecified vessel or lesion type
Essential hypertension
Essential hypertension
Heart failure
Heart failure
Hyperkalemia
Leg edema
R/O Chronic idiopathic constipation
R/O Chronic idiopathic constipation
Type 2 diabetes mellitus with hyperglycemia, without long-term current use of insulin
Acute kidney injury
Hyperkalemia
Type 2 diabetes mellitus with hyperglycemia, without long-term current use of insulin
Hyperkalemia

## 2019-01-15 NOTE — PROGRESS NOTE ADULT - PROBLEM SELECTOR PLAN 5
-A1C 7.2  -c/w Lantus 8 units QHS  -c/w Humalog  5-4-3 w/meals   -Because pt is being discharged home with Prednisone, will continue Lantus and Premeal coverage as outpatient. Recommendations to titrate requirements based on Fingerstick and SSI requirements.

## 2019-01-15 NOTE — PROGRESS NOTE ADULT - PROBLEM SELECTOR PROBLEM 2
Acute on chronic respiratory failure with hypoxia and hypercapnia
Acute on chronic respiratory failure with hypoxia and hypercapnia
AURORA (acute kidney injury)
Acute on chronic respiratory failure with hypoxia and hypercapnia
COPD exacerbation
AURORA (acute kidney injury)
Acute on chronic respiratory failure with hypoxia and hypercapnia
COPD exacerbation
Acute on chronic respiratory failure with hypoxia and hypercapnia

## 2019-01-15 NOTE — CHART NOTE - NSCHARTNOTEFT_GEN_A_CORE
Called by primary team to inform pt going to rehab today.  Since pt will continue steroid for next 3 days will continue Lantus 8 qhs plus Humalog  5-4-3 ac meals until steroids are completed then discontinue ALL insulin and restart Metforin 500mg bid.

## 2019-01-15 NOTE — PROGRESS NOTE ADULT - SUBJECTIVE AND OBJECTIVE BOX
[facility] - [facility]     PROGRESS NOTE    CHIEF COMPLAINT: chronic hypoxic/hypercapnic respiratory failure; COPD exacerbation; emphysema; dyspnea; obesity; atrial fibrillation with RVR (resolved)    INTERVAL HISTORY: difficulty walking due to denuded bullae on the top of her left foot; somewhat more short of breath at rest despite an oxygen saturation of 100%; s/p successful DCCV last week - remains in NSR - off amiodarone and beta-blockers; off BIPAP during the day although she feels like she needs it at times; ABG with improved hypercapnia without acidemia; arm swelling has resolved on diuretics and reduced dose of steroids; decreased lower extremity swelling with leg elevation; improved leg strength now easily standing up from a chair or commode and ambulating with physical therapy; resolved AURORA, hyperkalemia and hyponatremia; no cough, sputum production, chest congestion or wheeze; no fevers, chills or sweats; no chest pain/pressure or palpitations;     REVIEW OF SYSTEMS:  Constitutional: As per interval history  HEENT: Within normal limits  CV: As per interval history  Resp: As per interval history  GI: Within normal limits   : Within normal limits  Musculoskeletal: improving lower extremity weakness   Skin: denuded bullae on the top of the left foot  Neurological: Within normal limits  Psychiatric: frustrated   Endocrine: Within normal limits  Hematologic/Lymphatic: Within normal limits  Allergic/Immunologic: Within normal limits      MEDICATIONS:     Pulmonary "  buDESOnide   0.5 milliGRAM(s) Respule 0.5 milliGRAM(s) Inhalation every 12 hours  ipratropium    for Nebulization 500 MICROGram(s) Nebulizer every 6 hours  levalbuterol Inhalation 0.63 milliGRAM(s) Inhalation every 6 hours  montelukast 10 milliGRAM(s) Oral daily  theophylline ER Capsule 400 milliGRAM(s) Oral daily      Anti-microbials:  azithromycin   Tablet 250 milliGRAM(s) Oral <User Schedule>      Cardiovascular:  diltiazem    milliGRAM(s) Oral daily  furosemide    Tablet 40 milliGRAM(s) Oral daily  isosorbide   mononitrate ER Tablet (IMDUR) 30 milliGRAM(s) Oral daily      Other:  aluminum hydroxide/magnesium hydroxide/simethicone Suspension 30 milliLiter(s) Oral every 6 hours PRN  apixaban 5 milliGRAM(s) Oral every 12 hours  artificial tears (preservative free) Ophthalmic Solution 1 Drop(s) Both EYES three times a day  aspirin enteric coated 81 milliGRAM(s) Oral daily  BACItracin   Ointment 1 Application(s) Topical daily  Biotene Dry Mouth Oral Rinse 5 milliLiter(s) Swish and Spit two times a day  bisacodyl Suppository 10 milliGRAM(s) Rectal once  calamine Lotion 1 Application(s) Topical two times a day  cholecalciferol 2000 Unit(s) Oral daily  dextrose 40% Gel 15 Gram(s) Oral once PRN  dextrose 5%. 1000 milliLiter(s) IV Continuous <Continuous>  dextrose 50% Injectable 12.5 Gram(s) IV Push once  dextrose 50% Injectable 25 Gram(s) IV Push once  dextrose 50% Injectable 25 Gram(s) IV Push once  docusate sodium 100 milliGRAM(s) Oral three times a day  glucagon  Injectable 1 milliGRAM(s) IntraMuscular once PRN  hydrocortisone 1% Cream 1 Application(s) Topical daily  insulin glargine Injectable (LANTUS) 8 Unit(s) SubCutaneous at bedtime  insulin lispro (HumaLOG) corrective regimen sliding scale   SubCutaneous three times a day before meals  insulin lispro (HumaLOG) corrective regimen sliding scale   SubCutaneous at bedtime  insulin lispro Injectable (HumaLOG) 3 Unit(s) SubCutaneous with dinner  insulin lispro Injectable (HumaLOG) 4 Unit(s) SubCutaneous before lunch  insulin lispro Injectable (HumaLOG) 5 Unit(s) SubCutaneous before breakfast  melatonin 1 milliGRAM(s) Oral at bedtime  multivitamin 1 Tablet(s) Oral daily  ondansetron Injectable 4 milliGRAM(s) IV Push every 6 hours PRN  pantoprazole    Tablet 40 milliGRAM(s) Oral before breakfast  petrolatum Ophthalmic Ointment 1 Application(s) Both EYES at bedtime PRN  polyethylene glycol 3350 17 Gram(s) Oral daily  predniSONE   Tablet   Oral   predniSONE   Tablet 5 milliGRAM(s) Oral daily  senna 2 Tablet(s) Oral at bedtime  simethicone 80 milliGRAM(s) Chew once  simvastatin 10 milliGRAM(s) Oral at bedtime  sodium chloride 0.65% Nasal 1 Spray(s) Both Nostrils two times a day PRN        OBJECTIVE:    I&O's Detail    14 Jan 2019 07:01  -  15 Aroldo 2019 07:00  --------------------------------------------------------  IN:    Oral Fluid: 240 mL  Total IN: 240 mL    OUT:    Voided: 300 mL  Total OUT: 300 mL    Total NET: -60 mL    POCT Blood Glucose.: 143 mg/dL (15 Aroldo 2019 12:37)  POCT Blood Glucose.: 164 mg/dL (15 Aroldo 2019 08:31)  POCT Blood Glucose.: 176 mg/dL (14 Jan 2019 21:20)  POCT Blood Glucose.: 111 mg/dL (14 Jan 2019 17:13)      PHYSICAL EXAM:       ICU Vital Signs Last 24 Hrs  T(C): 37 (15 Aroldo 2019 12:42), Max: 37 (15 Aroldo 2019 12:42)  T(F): 98.6 (15 Aroldo 2019 12:42), Max: 98.6 (15 Aroldo 2019 12:42)  HR: 79 (15 Aroldo 2019 12:42) (73 - 81)  BP: 144/52 (15 Aroldo 2019 12:42) (143/81 - 147/75)  BP(mean): --  ABP: --  ABP(mean): --  RR: 18 (15 Aroldo 2019 12:42) (18 - 19)  SpO2: 100% (15 Aroldo 2019 12:42) (97% - 100%) on 4lpm nasal canula     General: Awake. Alert. Cooperative. Mild tachypnea Appears stated age. Obese.   HEENT:  Atraumatic. Normocephalic. Anicteric. Normal oral mucosa. PERRL. EOMI.   Neck: Supple. Trachea midline. Thyroid without enlargement/tenderness/nodules. No carotid bruit. No JVD.	  Cardiovascular: Regular rate and rhythm. Distant S1 S2. No murmurs, rubs or gallops.  Respiratory: Respirations unlabored. Markedly decreased breath sounds throughout. No wheeze. No curvature.  Abdomen: Soft. Non-tender. Non-distended. No organomegaly. No masses. Normal bowel sounds. Obese.  Extremities: Warm to touch. No clubbing or cyanosis. Mild bilateral lower extremity edema up to the thigh. Resolved upper extremity swelling. Decreased bilateral foot swelling with small right foot bullae, denuded larger left foot bullae, erythema and tenderness to palpitation - bandaged.  Pulses: Decreased lower extremity peripheral pulses.  Skin: No rashes or lesions. No ecchymoses. No cyanosis. Warm to touch.   Lymph Nodes: Cervical, supraclavicular and axillary nodes normal  Neurological: Motor and sensory examination equal and normal. A and O x 3  Psychiatry: Calm.    LABS:                        11.0   13.5  )-----------( 365      ( 15 Aroldo 2019 10:17 )             33.1     01-15    140  |  89<L>  |  22  ----------------------------<  180<H>  3.7   |  39<H>  |  0.84    01-12    138  |  87<L>  |  24<H>  ----------------------------<  178<H>  4.2   |  43<H>  |  1.19    Ca      10.0      01-15    Ca      9.4      01-12      Mg       2.0     01-12    TPro  6.9  /  Alb  4.2  /  TBili  0.4  /  DBili  x   /  AST  20  /  ALT  45  /  AlkPhos  56  01-09    ABG - ( 11 Jan 2019 06:44 )  pH: 7.38  /  pCO2: 79    /  pO2: 117   / HCO3: 45    / Base Excess: 17.5  /  SaO2: 98        ABG - ( 06 Jan 2019 06:44 )  pH: 7.39  /  pCO2: 68    /  pO2: 99    / HCO3: 40    / Base Excess: 13.3  /  SaO2: 98        ABG - ( 03 Jan 2019 06:40 )  pH: 7.44  /  pCO2: 65    /  pO2: 152   / HCO3: 43    / Base Excess: 15.9  /  SaO2: 97        ABG - ( 01 Jan 2019 03:44 )  pH: 7.42  /  pCO2: 69    /  pO2: 165   / HCO3: 44    / Base Excess: 17.3  /  SaO2: 97        ABG - ( 31 Dec 2018 16:19 )  pH: 7.38  /  pCO2: 84    /  pO2: 26    / HCO3: 48    / Base Excess: 20.2  /  SaO2: 41        ABG - ( 18 Dec 2018 13:23 )  pH: 7.46  /  pCO2: 47    /  pO2: 88    / HCO3: 33    / Base Excess: 8.0   /  SaO2: 97        ABG - ( 16 Dec 2018 20:53 )  pH: 7.44  /  pCO2: 53    /  pO2: 173   / HCO3: 36    / Base Excess: 10.0  /  SaO2: 100       ABG - ( 13 Dec 2018 06:29 )  pH: 7.30  /  pCO2: 71    /  pO2: 182   / HCO3: 34    / Base Excess: 5.8   /  SaO2: 99        ABG - ( 13 Dec 2018 00:59 )  pH: 7.32  /  pCO2: 71    /  pO2: 75    / HCO3: 35    / Base Excess: 7.1   /  SaO2: 95        ABG - ( 11 Dec 2018 11:45 )  pH: 7.39  /  pCO2: 54    /  pO2: 98    / HCO3: 32    / Base Excess: 6.2   /  SaO2: 98        ABG - ( 09 Dec 2018 11:26 )  pH: 7.35  /  pCO2: 63    /  pO2: 145   / HCO3: 34    / Base Excess: 6.6   /  SaO2: 99      ABG - ( 09 Dec 2018 00:28 )  pH: 7.32  /  pCO2: 71    /  pO2: 124   / HCO3: 36    / Base Excess: 7.6   /  SaO2: 99        ABG - ( 08 Dec 2018 20:50 )  pH: 7.32  /  pCO2: 72    /  pO2: 80    / HCO3: 36    / Base Excess: 7.7   /  SaO2: 95        Serum Pro-Brain Natriuretic Peptide: 254 pg/mL (12-13 @ 14:00)    < from: Transthoracic Echocardiogram (12.07.18 @ 08:59) >    Patient name: GUY HARTMAN  YOB: 1960   Age: 58 (F)   MR#: [id number]  Study Date: 12/7/2018  Location: [address] [place], [place] [id number]: [facility]  Study quality: Technically good  Referring Physician: Allen ingram MD  BloodPressure: 120/80 mmHg  Height: 163 cm  Weight: 88 kg  BSA: 1.9 m2  ------------------------------------------------------------------------  PROCEDURE: Transthoracic echocardiogram with 2-D, M-Mode  and complete spectral and color flow Doppler.  INDICATION: Other forms of dyspnea (R06.09)  ------------------------------------------------------------------------  Dimensions:    Normal Values:  LA:     3.6    2.0 - 4.0 cm  Ao:     2.9    2.0 - 3.8 cm  SEPTUM: 0.9    0.6 - 1.2 cm  PWT:    0.8    0.6- 1.1 cm  LVIDd:  4.9    3.0 - 5.6 cm  LVIDs:  2.9    1.8 - 4.0 cm  Derived variables:  LVMI: 73 g/m2  RWT: 0.32  Fractional short: 40 %  EF (Teicholtz): 71 %  Doppler Peak Velocity (m/sec): AoV=1.2  ------------------------------------------------------------------------  Observations:  Mitral Valve: Normal mitral valve.  Aortic Valve/Aorta: Normal trileaflet aortic valve. Peak  transaortic valve gradient equals 6 mm Hg, mean transaortic  valve gradient equals 3 mm Hg, aortic valve velocity time  integral equals 22 cm. Trace aortic regurgitation.  Aortic Root: 2.9 cm.  Left Atrium: Normal left atrium.  LA volume index = 18  cc/m2.  Left Ventricle: Normal left ventricular systolic function.  No segmental wall motion abnormalities. Normal left  ventricular internal dimensions and wall thicknesses. Mild  diastolic dysfunction (Stage I).  Right Heart: Normal right atrium. Normal right ventricular  size and function. Normal tricuspid valve. Minimal  tricuspid regurgitation. Normal pulmonic valve.  Pericardium/Pleura: Normal pericardium with no pericardial  effusion.  Hemodynamic: Estimated right atrial pressure is 8 mm Hg.  Inadequate tricuspid regurgitation Doppler envelope  precludes estimation of RVSP.  ------------------------------------------------------------------------  Conclusions:  1. Normal mitral valve.  2. Normal trileaflet aortic valve. Peak transaortic valve  gradient equals 6 mm Hg, mean transaortic valve gradient  equals 3 mm Hg, aortic valve velocity time integral equals  22 cm. Trace aortic regurgitation.  3. Aortic Root: 2.9 cm.  4. Normal left atrium.  LA volume index = 18 cc/m2.  5. Normal left ventricular internal dimensions and wall  thicknesses.  6. Normal left ventricular systolic function. No segmental  wall motion abnormalities.  7. Mild diastolic dysfunction (Stage I).  8. Normal right ventricular size and function.  9. Inadequate tricuspid regurgitation Doppler envelope  precludes estimation of RVSP.  10. Normal tricuspid valve. Minimal tricuspid  regurgitation.  *** Compared with echocardiogram report of 2/18/2014, no  significant changes noted.  ------------------------------------------------------------------------  Confirmed on  12/7/2018 - 13:49:43 by Shefali Gómez M.D.  ------------------------------------------------------------------------    < end of copied text >  ------------------------------------------------------------------------------------------------  MICROBIOLOGY:     Culture - Sputum . (12.07.18 @ 08:34)    Gram Stain:   Rare polymorphonuclear leukocytes per low power field  Rare Squamous epithelial cells per low power field  Numerous Gram Positive Cocci in Pairs and Chains per oil power field    Specimen Source: .Sputum Sputum    Culture Results:   Normal Respiratory Samaria present    Culture - Sputum . (12.05.18 @ 22:50)    Gram Stain:   Moderate polymorphonuclear leukocytes per low power field  Few Squamous epithelial cells per low power field  Moderate Gram Positive Cocci in Pairs and Chains per oil power field    Specimen Source: .Sputum Sputum    Culture Results:   Normal Respiratory Samaria present    Rapid Respiratory Viral Panel (11.30.18 @ 01:30)    Rapid RVP Result: NotDete: The [facility] RVP Rapid uses polymerase chain reaction (PCR) and melt  curve analysis to screen for adenovirus; coronavirus HKU1, NL63, 229E,  OC43; human metapneumovirus (hMPV); human enterovirus/rhinovirus  (Entero/RV); influenza A; influenza A/H1;influenza A/H3; influenza  A/H1-2009; influenza B; parainfluenza viruses 1, 2, 3, 4; respiratory  syncytial virus; Bordetella pertussis; Mycoplasma pneumoniae; and  Chlamydophila pneumoniae.    RADIOLOGY:  [x] Chest radiographs reviewed and interpreted by me    < from: Xray Chest 1 View- PORTABLE-Urgent (01.05.19 @ 17:56) >    EXAM:  XR CHEST PORTABLE URGENT 1V                          PROCEDURE DATE:  01/05/2019      INTERPRETATION:  CLINICAL INFORMATION: Shortness of breath.    EXAM: Frontal radiograph of the chest.    COMPARISON: Chest radiograph from 12/25/2018    FINDINGS:  Heart size is normal.    The lungs are clear. No pneumothorax or pleural effusions.    Visualized osseous structures are unremarkable.    IMPRESSION: Clear lungs.    ADAM VAUGHN M.D., RADIOLOGY RESIDENT  This document has been electronically signed.  JEYSON SANCHEZ M.D., ATTENDING RADIOLOGIST  This document has been electronically signed. Jan 7 2019  3:28PM      < end of copied text >  ---------------------------------------------------------------------------------------------------------------    < from: [facility] Duplex Lower Ext Vein Scan, Bilat (12.30.18 @ 19:06) >    EXAM:  DUPLEX SCAN EXT VEINS LOWER BI                          PROCEDURE DATE:  12/30/2018      INTERPRETATION:  Clinical indication: Worsening edema.    Technique:  Grayscale, color Doppler and spectral Doppler ultrasound was   utilized toevaluate bilateral lower extremity deep venous system.      Comparison: 12/6/2018.    Findings: There is no thrombosis in bilateral common femoral veins,   femoral veins or popliteal veins. Visualized calf veins are patent. There   is bilateral lowerextremity soft tissue edema.    Impression:     No evidence of deep vein thrombosis in either lower extremity.    BROCK TOBIN M.D., ATTENDING RADIOLOGIST  This document has been electronically signed. Dec 30 2018  7:24PM     < end of copied text >  ---------------------------------------------------------------------------------------------------------------  < from: VA Duplex Upper Ext Vein Scan, Bilat (12.30.18 @ 19:05) >    EXAM:  DUPLEX SCAN EXT VEINS UPPER BI                          PROCEDURE DATE:  12/30/2018      INTERPRETATION:  Clinical information: Worsening bilateral upper   extremity edema.    Findings: Duplex evaluation of the deep venous system of the right and   left upper extremity was performed to include the brachiocephalic,   internal jugular, subclavian, axillary, brachial, radial and ulnar veins.   No echogenic thrombus is seen within the vein lumina. Complete coaptation   of those segments of vein accessible to compression was achieved.   Spontaneous venous flow with respiratory and cardiac pulsatility is noted   throughout.     The right innominate vein is patent. The left innominate vein was not   visualized.     The right and left basilic and cephalic veins are patent and compressible.    Impression: No duplex evidence of DVT in the right and left upper   extremity.    RAYMUNDO DUMONT M.D., ATTENDING RADIOLOGIST  This document has been electronically signed. Dec 31 2018  8:49AM     < end of copied text >  ---------------------------------------------------------------------------------------------------------------  < from: CT Angio Chest w/ IV Cont (12.04.18 @ 16:30) >    EXAM:  CT ANGIO CHEST (W)AW IC                          PROCEDURE DATE:  12/04/2018      INTERPRETATION:  CT CHEST WITH CONTRAST    INDICATION: Known COPD not responding to steroids and bronchodilators.   Progressively worsening dyspnea. Evaluate for pulmonary embolism.    IMPRESSION:   1.  No pulmonary embolism.  2. Emphysematous changes of the lung.    DIANA MEDINA M.D., RADIOLOGY RESIDENT  This document has been electronically signed.  JEYSON SANCHEZ M.D., ATTENDING RADIOLOGIST  This document has been electronically signed. Dec  4 2018  5:21PM      < end of copied text >  ---------------------------------------------------------------------------------------------------------------  SPIROMETRY:     FEV1 0.56 liters - 22% predicted  FVC 1.73 liters - 52% predicted  FEV1% 32

## 2019-01-15 NOTE — PROGRESS NOTE ADULT - SUBJECTIVE AND OBJECTIVE BOX
CARDIOLOGY FOLLOW UP - Dr. Brunson    CC no cp or increase sob        PHYSICAL EXAM:  T(C): 37 (01-15-19 @ 12:42), Max: 37 (01-15-19 @ 12:42)  HR: 79 (01-15-19 @ 12:42) (73 - 81)  BP: 144/52 (01-15-19 @ 12:42) (143/81 - 147/75)  RR: 18 (01-15-19 @ 12:42) (18 - 19)  SpO2: 100% (01-15-19 @ 12:42) (97% - 100%)  Wt(kg): --  I&O's Summary    14 Jan 2019 07:01  -  15 Aroldo 2019 07:00  --------------------------------------------------------  IN: 240 mL / OUT: 300 mL / NET: -60 mL        Appearance: Normal	  Cardiovascular: Normal S1 S2,RRR, No JVD, No murmurs  Respiratory: Diminished    Gastrointestinal:  Soft, Non-tender, + BS	  Extremities: Normal range of motion, No clubbing, cyanosis or edema        MEDICATIONS  (STANDING):  apixaban 5 milliGRAM(s) Oral every 12 hours  artificial tears (preservative free) Ophthalmic Solution 1 Drop(s) Both EYES three times a day  aspirin enteric coated 81 milliGRAM(s) Oral daily  azithromycin   Tablet 250 milliGRAM(s) Oral <User Schedule>  BACItracin   Ointment 1 Application(s) Topical daily  Biotene Dry Mouth Oral Rinse 5 milliLiter(s) Swish and Spit two times a day  bisacodyl Suppository 10 milliGRAM(s) Rectal once  buDESOnide   0.5 milliGRAM(s) Respule 0.5 milliGRAM(s) Inhalation every 12 hours  calamine Lotion 1 Application(s) Topical two times a day  cholecalciferol 2000 Unit(s) Oral daily  dextrose 5%. 1000 milliLiter(s) (50 mL/Hr) IV Continuous <Continuous>  dextrose 50% Injectable 12.5 Gram(s) IV Push once  dextrose 50% Injectable 25 Gram(s) IV Push once  dextrose 50% Injectable 25 Gram(s) IV Push once  diltiazem    milliGRAM(s) Oral daily  docusate sodium 100 milliGRAM(s) Oral three times a day  furosemide    Tablet 40 milliGRAM(s) Oral daily  hydrocortisone 1% Cream 1 Application(s) Topical daily  insulin glargine Injectable (LANTUS) 8 Unit(s) SubCutaneous at bedtime  insulin lispro (HumaLOG) corrective regimen sliding scale   SubCutaneous three times a day before meals  insulin lispro (HumaLOG) corrective regimen sliding scale   SubCutaneous at bedtime  insulin lispro Injectable (HumaLOG) 3 Unit(s) SubCutaneous with dinner  insulin lispro Injectable (HumaLOG) 4 Unit(s) SubCutaneous before lunch  insulin lispro Injectable (HumaLOG) 5 Unit(s) SubCutaneous before breakfast  ipratropium    for Nebulization 500 MICROGram(s) Nebulizer every 6 hours  isosorbide   mononitrate ER Tablet (IMDUR) 30 milliGRAM(s) Oral daily  levalbuterol Inhalation 0.63 milliGRAM(s) Inhalation every 6 hours  melatonin 1 milliGRAM(s) Oral at bedtime  montelukast 10 milliGRAM(s) Oral daily  multivitamin 1 Tablet(s) Oral daily  pantoprazole    Tablet 40 milliGRAM(s) Oral before breakfast  polyethylene glycol 3350 17 Gram(s) Oral daily  predniSONE   Tablet   Oral   predniSONE   Tablet 5 milliGRAM(s) Oral daily  senna 2 Tablet(s) Oral at bedtime  simethicone 80 milliGRAM(s) Chew once  simvastatin 10 milliGRAM(s) Oral at bedtime  theophylline ER Capsule 400 milliGRAM(s) Oral daily      TELEMETRY: NSR  	    ECG:  	  RADIOLOGY:   DIAGNOSTIC TESTING:  [ ] Echocardiogram:  [ ]  Catheterization:  [ ] Stress Test:    OTHER: 	    LABS:	 	                                11.0   13.5  )-----------( 365      ( 15 Aroldo 2019 10:17 )             33.1     01-15    140  |  89<L>  |  22  ----------------------------<  180<H>  3.7   |  39<H>  |  0.84    Ca    10.0      15 Aroldo 2019 10:17

## 2019-01-15 NOTE — PROGRESS NOTE ADULT - PROBLEM SELECTOR PROBLEM 3
Type 2 diabetes mellitus with hyperglycemia, without long-term current use of insulin
Type 2 diabetes mellitus with hyperglycemia, without long-term current use of insulin
Acute on chronic respiratory failure with hypoxia and hypercapnia
Edema, unspecified type
Hyponatremia
Type 2 diabetes mellitus with hyperglycemia, without long-term current use of insulin
Edema, unspecified type
Type 2 diabetes mellitus with hyperglycemia, without long-term current use of insulin

## 2019-01-15 NOTE — PROGRESS NOTE ADULT - REASON FOR ADMISSION
dyspnea
shortness of breath
dyspnea
dyspnea on exertion; acute on chronic respiratory failure secondary to advanced COPD

## 2019-01-15 NOTE — PROGRESS NOTE ADULT - ASSESSMENT
58 F PMH COPD on home O2 3L, HTN, HLD, CAD s/p x6 stents, T2DM, pHTN, PVD, p/w progressive worsening dyspnea x 2 weeks now complicated by chronic progressive hypoxic respiratory failure.     1. New onset AFIB  S/P successful DCCV, remains NSR    EVAN with no evidence of atrial thrombus   continue Cardizem   no longer on amiodarone given lung disease  CHADSVASC 4 continue Eliquis 5mg PO BID  EP f/u     2. Chronic progressive hypoxic respiratory failure  multifactorial in the setting of worsening COPD, CAD, chronic diastolic CHF, pulmonary HTN   LE edema stable, continue lasix 40mg PO daily   UE/ LE dopplers negative for DVT   echo with normal LV function, mild diastolic dysfunction, inadequate tricuspid regurg   cv stable , bipap PRN, pulm f/u   EKG reviewed, corrected QT WNL (aprox 462), on azithromycin    3. CAD s/p PCI  cv stable, continue asa, imdur    4. COPD   on bipap prn, management per pulm     5. hyponatremia/hyperkalemia  trend bmp, med f/u    dvt ppx   dc planning per primary team

## 2019-01-15 NOTE — PROGRESS NOTE ADULT - PROBLEM SELECTOR PROBLEM 7
Leg edema
Leg edema
Leukocytosis
Leukocytosis
Coronary artery disease involving native heart without angina pectoris, unspecified vessel or lesion type
Essential hypertension
Heart failure
Leg edema
Leukocytosis
Prophylactic measure
Leukocytosis
Essential hypertension
Leukocytosis

## 2019-01-15 NOTE — PROGRESS NOTE ADULT - PROBLEM SELECTOR PROBLEM 1
COPD exacerbation
Type 2 diabetes mellitus with hyperglycemia, without long-term current use of insulin
COPD exacerbation
Type 2 diabetes mellitus with hyperglycemia, without long-term current use of insulin
Type 2 diabetes mellitus with hyperglycemia, without long-term current use of insulin
Atrial fibrillation
Atrial fibrillation with RVR
Atrial fibrillation with RVR
COPD exacerbation
Hyperkalemia
Type 2 diabetes mellitus with hyperglycemia, without long-term current use of insulin
Type 2 diabetes mellitus with hypoglycemia without coma, without long-term current use of insulin
Type 2 diabetes mellitus with hyperglycemia, without long-term current use of insulin
Type 2 diabetes mellitus with hyperglycemia, without long-term current use of insulin
COPD exacerbation
Hyperkalemia
Type 2 diabetes mellitus with hyperglycemia, without long-term current use of insulin
Type 2 diabetes mellitus with hypoglycemia without coma, without long-term current use of insulin
Type 2 diabetes mellitus with hypoglycemia without coma, without long-term current use of insulin
Type 2 diabetes mellitus with hyperglycemia, without long-term current use of insulin
Type 2 diabetes mellitus with hyperglycemia, without long-term current use of insulin
COPD exacerbation
Atrial fibrillation
COPD exacerbation

## 2019-01-15 NOTE — CHART NOTE - NSCHARTNOTEFT_GEN_A_CORE
Request from Dr. Sandoval to facilitate patient discharge. Medication reconciliation reviewed, revised, and resolved with Dr. Sandoval who had medically cleared patient for discharge Please refer to discharge note for detailed hospital course. Patient is currently stable for discharge to Acute Pulmonary Rehab at [facility].    Contact # [phone]

## 2019-01-15 NOTE — PROGRESS NOTE ADULT - PROBLEM SELECTOR PROBLEM 4
Essential hypertension
Hyperkalemia
Acute kidney injury
Acute kidney injury
AURORA (acute kidney injury)
Acute kidney injury
Acute on chronic respiratory failure with hypoxia and hypercapnia
Essential hypertension
Hyperkalemia
Type 2 diabetes mellitus with hyperglycemia, without long-term current use of insulin
Acute kidney injury
Nausea
Acute kidney injury
Acute on chronic respiratory failure with hypoxia and hypercapnia
Acute kidney injury
Essential hypertension

## 2019-01-15 NOTE — PROGRESS NOTE ADULT - PROBLEM SELECTOR PLAN 4
-on home O2 3L.   -Continue COPD management as above.  -Continue on BIPAP bedtime  -Plan for discharge to acute pulmonary rehab at [facility] today. Pt pending lung transplant rohit delong/ [facility]

## 2019-01-15 NOTE — PROGRESS NOTE ADULT - PROBLEM SELECTOR PROBLEM 8
Prophylactic measure
Thrush
Thrush
Heart failure
Heart failure
Chronic idiopathic constipation
Chronic idiopathic constipation
Essential hypertension
Heart failure
Leg edema
Leg edema
Prophylactic measure
Thrush
Heart failure

## 2019-01-15 NOTE — PROGRESS NOTE ADULT - ASSESSMENT
ASSESSMENT:    atrial arrythmias with RVR in the setting of severe lung disease, long-term beta-agonist use, elevated sympathetic tone due to critical illness and anxiety - s/p successful DCCV    ongoing but improving dyspnea with exertion with chronic hypoxic and hypercapnic respiratory failure due to very severe COPD with progression of disease - adequate oxygenation on a nasal canula in the setting of dyspnea suggests that alterations in the mechanics of breathing due to lung hyperinflation and obesity are likely playing a significant role in her shortness of breath - anxiety is also playing a role - there is evidence of CAD without pulmonary hypertension on the CT scan which is without pulmonary emboli or pneumonia - cardiac catheterization last year revealed patent stents - ECHO has a preserved LVEF, no significant valvular heart disease and no pulmonary hypertension - resolved AURORA, hyperkalemia and hyponatremia - resolved respiratory acidosis on repeat ABG - lower extremity weakness likely due to steroid induced myopathy perhaps exacerbated by statins is much improved    extremity edema/discomfort likely related to steroid induced fluid retention - no evidence of DVT on repeat upper or lower extremity Duplex examination - resolved upper extremity edema - improving lower extremity edema with bedrest - feet now with decreased swelling and erythema - she describes severe foot pain especially at the site of the denuded bullae on the top of her left foot    HTN/HLD/DM    CAD s/p PCI x 6    PAD    PLAN/RECOMMENDATIONS:    BIPAP 12/5 with 40% FiO2 for sleep - on during the day only as a last resort for increased work of breathing or recurrent respiratory acidosis - she has weaned off daytime BIPAP in anticipation of transfer to an acute rehabilitation center  oxygen supplementation to keep saturation greater than 92% using a nasal canula when off BIPAP  following ABG  sputum culture - no growth x2 - has completed a course of biaxin  home trilogy vent has been arranged with [facility]   xopenex/atrovent nebs q6h   pulmicort 0.5mg nebs q12h  singulair/theophylline - theophylline level is subtherapeutic - daliresp discontinued  prednisone 5mg daily x 5 days then discontinue - glucose control - watch for thrush off nystatin  azithromycin TIW for antiinflammatory effects  cardiology and EP service recommendations noted - off amiodarone and beta blockers  cardiac meds: ASA/eliquis/imdur/lasix/diltiazem CD/zocor - off norvasc due to swelling - BP control adequate  diurese as tolerated by renal function and hemodynamics - watch for worsening metabolic alkalosis with loop diuretic use which could lead to worsening hypercapnia  GI/DVT prophylaxis - protoix/eliquis  physical therapy daily - wound care to feet and buttock  leg elevation  transfer to acute rehab   outpatient evaluation for lung transplantation    Will follow with you. Plan of care discussed with the patient at bedside, the dedicated floor NP and the . I have reached out to the Health system transplant team. The patient has been accepted to [place] acute rehab where they will be able to evaluate the patient for possible lung transplantation and perform appropriate testing as needed.    David Fair MD  Pulmonary Medicine  [phone]

## 2019-01-15 NOTE — PROGRESS NOTE ADULT - SUBJECTIVE AND OBJECTIVE BOX
Mike Sandoval M.D. Pager Number [phone]    Patient is a 58y old  Female who presents with a chief complaint of dyspnea (15 Aroldo 2019 12:56)      SUBJECTIVE / OVERNIGHT EVENTS:    ROS:  All other review of systems negative    Allergies    No Known Allergies    Intolerances    albuterol (Unknown)      MEDICATIONS  (STANDING):  apixaban 5 milliGRAM(s) Oral every 12 hours  artificial tears (preservative free) Ophthalmic Solution 1 Drop(s) Both EYES three times a day  aspirin enteric coated 81 milliGRAM(s) Oral daily  azithromycin   Tablet 250 milliGRAM(s) Oral <User Schedule>  BACItracin   Ointment 1 Application(s) Topical daily  Biotene Dry Mouth Oral Rinse 5 milliLiter(s) Swish and Spit two times a day  bisacodyl Suppository 10 milliGRAM(s) Rectal once  buDESOnide   0.5 milliGRAM(s) Respule 0.5 milliGRAM(s) Inhalation every 12 hours  calamine Lotion 1 Application(s) Topical two times a day  cholecalciferol 2000 Unit(s) Oral daily  dextrose 5%. 1000 milliLiter(s) (50 mL/Hr) IV Continuous <Continuous>  dextrose 50% Injectable 12.5 Gram(s) IV Push once  dextrose 50% Injectable 25 Gram(s) IV Push once  dextrose 50% Injectable 25 Gram(s) IV Push once  diltiazem    milliGRAM(s) Oral daily  docusate sodium 100 milliGRAM(s) Oral three times a day  furosemide    Tablet 40 milliGRAM(s) Oral daily  hydrocortisone 1% Cream 1 Application(s) Topical daily  insulin glargine Injectable (LANTUS) 8 Unit(s) SubCutaneous at bedtime  insulin lispro (HumaLOG) corrective regimen sliding scale   SubCutaneous three times a day before meals  insulin lispro (HumaLOG) corrective regimen sliding scale   SubCutaneous at bedtime  insulin lispro Injectable (HumaLOG) 3 Unit(s) SubCutaneous with dinner  insulin lispro Injectable (HumaLOG) 4 Unit(s) SubCutaneous before lunch  insulin lispro Injectable (HumaLOG) 5 Unit(s) SubCutaneous before breakfast  ipratropium    for Nebulization 500 MICROGram(s) Nebulizer every 6 hours  isosorbide   mononitrate ER Tablet (IMDUR) 30 milliGRAM(s) Oral daily  levalbuterol Inhalation 0.63 milliGRAM(s) Inhalation every 6 hours  melatonin 1 milliGRAM(s) Oral at bedtime  montelukast 10 milliGRAM(s) Oral daily  multivitamin 1 Tablet(s) Oral daily  pantoprazole    Tablet 40 milliGRAM(s) Oral before breakfast  polyethylene glycol 3350 17 Gram(s) Oral daily  predniSONE   Tablet   Oral   predniSONE   Tablet 5 milliGRAM(s) Oral daily  senna 2 Tablet(s) Oral at bedtime  simethicone 80 milliGRAM(s) Chew once  simvastatin 10 milliGRAM(s) Oral at bedtime  theophylline ER Capsule 400 milliGRAM(s) Oral daily    MEDICATIONS  (PRN):  aluminum hydroxide/magnesium hydroxide/simethicone Suspension 30 milliLiter(s) Oral every 6 hours PRN Dyspepsia  dextrose 40% Gel 15 Gram(s) Oral once PRN Blood Glucose LESS THAN 70 milliGRAM(s)/deciliter  glucagon  Injectable 1 milliGRAM(s) IntraMuscular once PRN Glucose LESS THAN 70 milligrams/deciliter  ondansetron Injectable 4 milliGRAM(s) IV Push every 6 hours PRN Nausea and/or Vomiting  petrolatum Ophthalmic Ointment 1 Application(s) Both EYES at bedtime PRN dry eyes  sodium chloride 0.65% Nasal 1 Spray(s) Both Nostrils two times a day PRN Nasal Congestion      Vital Signs Last 24 Hrs  T(C): 37 (15 Aroldo 2019 12:42), Max: 37 (15 Aroldo 2019 12:42)  T(F): 98.6 (15 Aroldo 2019 12:42), Max: 98.6 (15 Aroldo 2019 12:42)  HR: 79 (15 Aroldo 2019 12:42) (73 - 81)  BP: 144/52 (15 Aroldo 2019 12:42) (143/81 - 147/75)  BP(mean): --  RR: 18 (15 Aroldo 2019 12:42) (18 - 19)  SpO2: 100% (15 Aroldo 2019 12:42) (97% - 100%)  CAPILLARY BLOOD GLUCOSE      POCT Blood Glucose.: 143 mg/dL (15 Aroldo 2019 12:37)  POCT Blood Glucose.: 164 mg/dL (15 Aroldo 2019 08:31)  POCT Blood Glucose.: 176 mg/dL (14 Jan 2019 21:20)  POCT Blood Glucose.: 111 mg/dL (14 Jan 2019 17:13)    I&O's Summary    14 Jan 2019 07:01  -  15 Aroldo 2019 07:00  --------------------------------------------------------  IN: 240 mL / OUT: 300 mL / NET: -60 mL        PHYSICAL EXAM:  GENERAL: NAD, well-developed  NECK: Supple, No JVD  CHEST/LUNG: Decreased breath sounds, No wheezing. Supplemental O2 with NC  HEART: Regular rate and rhythm; No murmurs, rubs, or gallops  ABDOMEN: Soft, Nontender, Nondistended; Bowel sounds present  EXTREMITIES:  2+ Peripheral Pulses, No clubbing, cyanosis, or edema  NEUROLOGY: AAOx3, non-focal  PSYCH: Irritable     LABS:                        11.0   13.5  )-----------( 365      ( 15 Aroldo 2019 10:17 )             33.1     01-15    140  |  89<L>  |  22  ----------------------------<  180<H>  3.7   |  39<H>  |  0.84    Ca    10.0      15 Aroldo 2019 10:17

## 2019-01-15 NOTE — PROGRESS NOTE ADULT - PROBLEM SELECTOR PLAN 2
-Chronic  -Continue Prednisone 5mg daily x 5 days (End date 1/18/19)  -Continue Pulmicort & Duoneb nebs, Theophylline, Singulair  -Continue bipap at night. Patient does NOT require BIPAP.  -Plan for discharge to acute pulmonary rehab at [facility] today.

## 2019-01-15 NOTE — PROGRESS NOTE ADULT - ATTENDING COMMENTS
Ilana Monreo MD  Division of Hospital Medicine  Pager: [phone]  Office: [phone]
Agree with above NP note.  cv stable  cont current meds  d/c planning
Agree with above NP note.  cv stable  cont current tx
Agree with above NP note.  cv stable  cont current tx  d/c planning
Agree with above NP note.  cv stable  cont current tx  med/pulmo f/u  cont lasix daily, eventual change to po
Agree with above NP note.  cv stable  cont current tx  med/pulmo f/u  lasix 20mg PO daily
Agree with above NP note.  cv stable  cont lasix as ordered  await repeat leg duplex  med/pulmo f/u
Agree with above NP note.  cv stable  cont lasix as ordered  med/pulmo f/u
Agree with above NP note.  cv stable  cont lasix as ordered  med/pulmo f/u
Agree with above NP note.  cv stable  in sr  cont amio   cont a/c
Agree with above NP note.  cv stable  in sr  cont amio, ccb  cont a/c  lasix daily
Agree with above NP note.  cv stable  in sr  cont rate control/a/c  cont lasix  med f/u
Agree with above NP note.  cv stable  in sr  cont rate control/a/c  cont lasix  med f/u
Agree with above NP note.  cv stable  lasix as ordered  trend bmp
Agree with above NP note.  remains in nsr  cont current meds
Agree with above NP note.  s/p successful DCCV  remains in nsr  cont current meds
agree with above NP note.  cv stable  trial of if lasix  med f/u
events noted  d/w med nurse prac.  af with RVR   rrt called  did not respond to iv diltizaem  repsonded well to iv bb  converted to sr  start metoprolol 25 bid  start iv heparin for now   tx to tele
Patient seen and examined.  Agree with above NP note.  still tachy to 150s on 15mg caridzem drip  bb/amio stopped by eps  d/c cardizem, not effective  ivp metorolol 5mg now  restart amio  monty/dccv bran if persists
Agree with above NP note.  cv stable  cont lasix as ordered  no cardiac CI for azithro  med/pulmo f/u
Agree with above NP note.  eps fu for dccv
Edema improved  1.  Volume overload--diuretic titratio to keep HCO3< 35 without decrease pH<7.3.  Oxygen (avoid superoxia to suppress drive, diamox+loop PRN  2.  Hyperkalemia--improved with diamox associated bicarbonaturia
I have seen this patient with the fellow and agree with their assessment and plan. In addition, in setting of ARB use and hyperglycemia, most likely due to low elida/renin state. Now off Benicar, and switched to Norvasc. K now wnl.   - Can give lasix 20mg IV daily as needed for edema (will help with Na as well).   - Agree with pulm that doesn't need diamox as most of the alkalosis is compensatory to her chronic resp acidosis and would not disturb that too much.    Will sign off. Please reconsult as needed.    Carol Elliott MD  Cell   Pager   Office 
I have seen this patient with the fellow and agree with their assessment and plan. In addition, had hyperkalemia and had improved coming of benicar. Now hyperkalemic again with hyponatremia and AURORA. This is likely from morphine leading to decreased Po intake and causing some distal urinary retention likely.    Check bladder scan post void  Can hydrate gently with Normal saline, no bicarb given chronic alkalosis  Check Na and K in 8 hours  Consider holding morphine for now    Carol Elliott MD  Cell   Pager   Office     I shall be away next few days, Dr Amado Hdz will be covering the service.
SOB with minimal exertion  1.  Dyspnea--volume, COPD optimize.  Diamox may help with 2 as well  2.  Hyperkalemia--glycemic control.  Diuretic. diamox alternating with loop to keep HCO3<35.  ABG.  Pulmonary, cardio input.
Agree with fellow recommendations.
On BIPAP.  Alert, conversant, on computer  1.  Hyperkalemia--check ulytes, osm.  K restriction.  Loop diuretic  2.  ARF--PVR bladder scan, renal sono.  non oliguric, no HD.  Consider PPI-->H2 blocker switch  3.  Volume overload--R CHF, other.  Optimize.  Can exacerbate 2 by renal venous congestion
SOB, on BIPAP, edema  1.  Hyperkalemia--improve Na delivery with loop+/-diamox  2.  ARF--given dyspnea, edema renal venous congestion a distinct possibility.  Renal sono, dopplers and PVR bladder scan.  Would continue diuresis as long as symptoms improve and Cr<2.  3.  Acidosis--respiratory but offset by lo0p diuretic.  Keep HCO3 <35 and pH >7.3
AURORA, hyperkalemia, hyponatremia  Labs improving, remains hyponatremic  Edematous  Continue fluid restriction  Remainder per fellow
Ilana Monroe MD  Division of Hospital Medicine  Pager: [phone]  Office: [phone]
31mins spent discussing discharge instructions.
Andre Reyes, M.D.  [facility] Attending  Office: [phone]   Pager: [phone]
Andre Reyes, M.D.  [facility] Attending  Office: [phone]   Pager: [phone]
Ilana Monroe MD  Division of Hospital Medicine  Pager: [phone]  Office: [phone]
patient only using BIPAP at night, has not required BIPAP during day and does not need it during the day.    Ilana Monroe MD  Division of Hospital Medicine  Pager: [phone]  Office: [phone]

## 2019-01-15 NOTE — PROGRESS NOTE ADULT - PROBLEM/PLAN-1
DISPLAY PLAN FREE TEXT

## 2019-02-13 ENCOUNTER — NON-APPOINTMENT (OUTPATIENT)
Age: 59
End: 2019-02-13

## 2019-02-13 ENCOUNTER — APPOINTMENT (OUTPATIENT)
Dept: CARDIOLOGY | Facility: CLINIC | Age: 59
End: 2019-02-13
Payer: COMMERCIAL

## 2019-02-13 VITALS
HEIGHT: 64 IN | RESPIRATION RATE: 15 BRPM | WEIGHT: 194 LBS | SYSTOLIC BLOOD PRESSURE: 129 MMHG | HEART RATE: 78 BPM | BODY MASS INDEX: 33.12 KG/M2 | DIASTOLIC BLOOD PRESSURE: 83 MMHG

## 2019-02-13 PROCEDURE — 99214 OFFICE O/P EST MOD 30 MIN: CPT

## 2019-02-13 PROCEDURE — 93000 ELECTROCARDIOGRAM COMPLETE: CPT

## 2019-02-13 RX ORDER — PREDNISONE 10 MG/1
10 TABLET ORAL DAILY
Qty: 180 | Refills: 0 | Status: DISCONTINUED | COMMUNITY
Start: 2018-11-21 | End: 2019-02-13

## 2019-02-13 RX ORDER — CLARITHROMYCIN 500 MG/1
500 TABLET, FILM COATED ORAL TWICE DAILY
Qty: 20 | Refills: 0 | Status: COMPLETED | COMMUNITY
Start: 2018-11-21 | End: 2019-02-13

## 2019-02-13 RX ORDER — ALPRAZOLAM 0.25 MG/1
0.25 TABLET ORAL DAILY
Qty: 30 | Refills: 0 | Status: COMPLETED | COMMUNITY
Start: 2017-08-30 | End: 2019-02-13

## 2019-02-22 ENCOUNTER — MEDICATION RENEWAL (OUTPATIENT)
Age: 59
End: 2019-02-22

## 2019-03-06 ENCOUNTER — APPOINTMENT (OUTPATIENT)
Dept: INTERNAL MEDICINE | Facility: CLINIC | Age: 59
End: 2019-03-06
Payer: COMMERCIAL

## 2019-03-06 VITALS
BODY MASS INDEX: 32.1 KG/M2 | TEMPERATURE: 98.4 F | DIASTOLIC BLOOD PRESSURE: 60 MMHG | HEART RATE: 84 BPM | HEIGHT: 64 IN | OXYGEN SATURATION: 97 % | WEIGHT: 188 LBS | SYSTOLIC BLOOD PRESSURE: 108 MMHG

## 2019-03-06 DIAGNOSIS — I83.029 VARICOSE VEINS OF LEFT LOWER EXTREMITY WITH ULCER OF UNSPECIFIED SITE: ICD-10-CM

## 2019-03-06 DIAGNOSIS — I83.892 VARICOSE VEINS OF LEFT LOWER EXTREMITY WITH ULCER OF UNSPECIFIED SITE: ICD-10-CM

## 2019-03-06 DIAGNOSIS — R60.9 VARICOSE VEINS OF LEFT LOWER EXTREMITY WITH ULCER OF UNSPECIFIED SITE: ICD-10-CM

## 2019-03-06 DIAGNOSIS — L97.929 VARICOSE VEINS OF LEFT LOWER EXTREMITY WITH ULCER OF UNSPECIFIED SITE: ICD-10-CM

## 2019-03-06 PROCEDURE — 90750 HZV VACC RECOMBINANT IM: CPT

## 2019-03-06 PROCEDURE — 36415 COLL VENOUS BLD VENIPUNCTURE: CPT

## 2019-03-06 PROCEDURE — 90471 IMMUNIZATION ADMIN: CPT

## 2019-03-06 PROCEDURE — 99214 OFFICE O/P EST MOD 30 MIN: CPT | Mod: 25

## 2019-03-06 RX ORDER — AZTREONAM 2 G
1 VIAL (EA) INJECTION
Qty: 0 | Refills: 0 | COMMUNITY
Start: 2019-03-06 | End: 2019-03-15

## 2019-03-07 NOTE — HEALTH RISK ASSESSMENT
[Intercurrent ED visits] : went to ED [Intercurrent hospitalizations] : was admitted to the hospital  [No falls in past year] : Patient reported no falls in the past year [0] : 2) Feeling down, depressed, or hopeless: Not at all (0) [] : No [de-identified] : cardio,pulm/[facility] [de-identified] : ID [de-identified] : ADA; low salt; low fat [[id number]] : 0

## 2019-03-07 NOTE — PHYSICAL EXAM
[No Acute Distress] : no acute distress [Well Nourished] : well nourished [Well Developed] : well developed [Well-Appearing] : well-appearing [Normal Voice/Communication] : normal voice/communication [Normal Sclera/Conjunctiva] : normal sclera/conjunctiva [PERRL] : pupils equal round and reactive to light [EOMI] : extraocular movements intact [Normal Outer Ear/Nose] : the outer ears and nose were normal in appearance [Normal Oropharynx] : the oropharynx was normal [No JVD] : no jugular venous distention [Supple] : supple [No Respiratory Distress] : no respiratory distress  [Clear to Auscultation] : lungs were clear to auscultation bilaterally [No Accessory Muscle Use] : no accessory muscle use [Normal Rate] : normal rate  [Regular Rhythm] : with a regular rhythm [Normal S1, S2] : normal S1 and S2 [No Carotid Bruits] : no carotid bruits [Pedal Pulses Present] : the pedal pulses are present [No Edema] : there was no peripheral edema [No Extremity Clubbing/Cyanosis] : no extremity clubbing/cyanosis [Soft] : abdomen soft [Normal Bowel Sounds] : normal bowel sounds [No CVA Tenderness] : no CVA  tenderness [No Spinal Tenderness] : no spinal tenderness [No Rash] : no rash [Normal Gait] : normal gait [No Focal Deficits] : no focal deficits [Speech Grossly Normal] : speech grossly normal [Alert and Oriented x3] : oriented to person, place, and time [de-identified] : Wears NC oxygen; purse-lipped breathing [de-identified] : no ST [de-identified] : no stridor [de-identified] : R=16-20;  very diminished AE; no wheezing [de-identified] : no cords; ?varicosity/hematoma with overlying erythema, warmth, and tenderness; ecchymoses/bruising over upper surface of right foot; no cords [de-identified] : tearful at times

## 2019-03-07 NOTE — REVIEW OF SYSTEMS
[Vision Problems] : vision problems [Nasal Discharge] : nasal discharge [Postnasal Drip] : postnasal drip [Palpitations] : palpitations [Shortness Of Breath] : shortness of breath [Dyspnea on Exertion] : dyspnea on exertion [Heartburn] : heartburn [Incontinence] : incontinence [Joint Pain] : joint pain [Back Pain] : back pain [Insomnia] : insomnia [Anxiety] : anxiety [Negative] : Heme/Lymph

## 2019-03-07 NOTE — ASSESSMENT
[FreeTextEntry1] : ?varicosity right LE\par ?rupture with bruising and hematoma formation\par ?skin ulcer forming\par To do LE venous and arterial dopplers\par To see vascular ASAP = referrals given\par Keep leg elevated\par Apply warm soaks\par Bactrim DS bid for 7-10 days\par To call if worsening or not continuing to improve\par \par Emphysema/COPD\par End-stage and being evaluated for lung transplant\par S/P prolonged hospitalization for exacerbation with respiratory failure\par Continue oxygen 24 hours per day\par Resume BIPAP at hs\par Symbicort bid\par Spiriva daily\par Nebs with Xopenex and Atrovent qid\par Charan daily\par Singulair daily\par Zithromax TIW\par Will monitor CT, PFT's with ABG\par Had flu vaccine\par No smoking\par Resume OR\par Weight control\par F/U at [facility]\par \par PAF\par Having ?palpitations\par F/U with Dr. Chapa\par ?event recorder\par Remains on Cardizem and Eliquis\par \par RTC 4 weeks and as needed\par To call for any medical issues\par Full labs sent\par Shingrix #2 given

## 2019-03-07 NOTE — HISTORY OF PRESENT ILLNESS
[FreeTextEntry1] : Comes in for f/u medical visit. [de-identified] : Developed PAF while hospitalized.\par On Diltiazem and Eliquis.\par Late last week developed a lump in right ankle area.\par Area raised, red, tender, warm.\par Then developed bruising on upper foot.\par No fever or discharge.\par Able to ambulate.\par No calf pain or swelling.\par Denies any injury or trauma.\par Normal BM/urination.\par No abdominal pain or n/v.\par Trying to diet.\par Denies chest pain.\par Severe NIELSON with minimal exertion.\par No cough or hemoptysis.\par Sad over health status.\par ?palpitations at times.

## 2019-03-07 NOTE — COUNSELING
[Weight management counseling provided] : Weight management [Healthy eating counseling provided] : healthy eating [Activity counseling provided] : activity [Needs reinforcement, provided] : Patient needs reinforcement on understanding of disease, goals and obesity follow-up plan; reinforcement was provided [Weight Self Once Weekly] : Weight self once weekly [Low Fat Diet] : Low fat diet [Low Salt Diet] : Low salt diet [Decrease Portions] : Decrease food portions [Walking] : Walking

## 2019-03-08 DIAGNOSIS — Z86.39 PERSONAL HISTORY OF OTHER ENDOCRINE, NUTRITIONAL AND METABOLIC DISEASE: ICD-10-CM

## 2019-03-08 DIAGNOSIS — E83.52 HYPERCALCEMIA: ICD-10-CM

## 2019-03-08 LAB
25(OH)D3 SERPL-MCNC: 58.3 NG/ML
ALBUMIN SERPL ELPH-MCNC: 4.5 G/DL
ALP BLD-CCNC: 60 U/L
ALT SERPL-CCNC: 13 U/L
ANION GAP SERPL CALC-SCNC: 17 MMOL/L
AST SERPL-CCNC: 17 U/L
BASOPHILS # BLD AUTO: 0.03 K/UL
BASOPHILS NFR BLD AUTO: 0.4 %
BILIRUB SERPL-MCNC: 0.2 MG/DL
BUN SERPL-MCNC: 25 MG/DL
CALCIUM SERPL-MCNC: 10.7 MG/DL
CHLORIDE SERPL-SCNC: 94 MMOL/L
CHOLEST SERPL-MCNC: 161 MG/DL
CHOLEST/HDLC SERPL: 2 RATIO
CO2 SERPL-SCNC: 32 MMOL/L
CREAT SERPL-MCNC: 1.2 MG/DL
EOSINOPHIL # BLD AUTO: 0.19 K/UL
EOSINOPHIL NFR BLD AUTO: 2.4 %
GLUCOSE SERPL-MCNC: 70 MG/DL
HBA1C MFR BLD HPLC: 5.6 %
HCT VFR BLD CALC: 38.4 %
HDLC SERPL-MCNC: 79 MG/DL
HGB BLD-MCNC: 10.8 G/DL
IMM GRANULOCYTES NFR BLD AUTO: 0.3 %
LDLC SERPL CALC-MCNC: 66 MG/DL
LYMPHOCYTES # BLD AUTO: 1.32 K/UL
LYMPHOCYTES NFR BLD AUTO: 16.6 %
MAN DIFF?: NORMAL
MCHC RBC-ENTMCNC: 27.2 PG
MCHC RBC-ENTMCNC: 28.1 GM/DL
MCV RBC AUTO: 96.7 FL
MONOCYTES # BLD AUTO: 0.67 K/UL
MONOCYTES NFR BLD AUTO: 8.4 %
NEUTROPHILS # BLD AUTO: 5.71 K/UL
NEUTROPHILS NFR BLD AUTO: 71.9 %
PLATELET # BLD AUTO: 314 K/UL
POTASSIUM SERPL-SCNC: 4.8 MMOL/L
PROT SERPL-MCNC: 7 G/DL
RBC # BLD: 3.97 M/UL
RBC # FLD: 14.5 %
SODIUM SERPL-SCNC: 143 MMOL/L
TRIGL SERPL-MCNC: 79 MG/DL
TSH SERPL-ACNC: 2.93 UIU/ML
WBC # FLD AUTO: 7.94 K/UL

## 2019-03-14 ENCOUNTER — LABORATORY RESULT (OUTPATIENT)
Age: 59
End: 2019-03-14

## 2019-03-15 ENCOUNTER — INPATIENT (INPATIENT)
Facility: HOSPITAL | Age: 59
LOS: 2 days | Discharge: HOME | DRG: 683 | End: 2019-03-18
Attending: STUDENT IN AN ORGANIZED HEALTH CARE EDUCATION/TRAINING PROGRAM | Admitting: STUDENT IN AN ORGANIZED HEALTH CARE EDUCATION/TRAINING PROGRAM
Payer: COMMERCIAL

## 2019-03-15 VITALS
SYSTOLIC BLOOD PRESSURE: 124 MMHG | HEART RATE: 86 BPM | HEIGHT: 64 IN | RESPIRATION RATE: 20 BRPM | TEMPERATURE: 98 F | DIASTOLIC BLOOD PRESSURE: 72 MMHG | WEIGHT: 188.05 LBS | OXYGEN SATURATION: 99 %

## 2019-03-15 LAB
BASOPHILS # BLD AUTO: 0.1 K/UL — SIGNIFICANT CHANGE UP (ref 0–0.2)
BASOPHILS NFR BLD AUTO: 1 % — SIGNIFICANT CHANGE UP (ref 0–2)
EOSINOPHIL # BLD AUTO: 0.2 K/UL — SIGNIFICANT CHANGE UP (ref 0–0.5)
EOSINOPHIL NFR BLD AUTO: 2.5 % — SIGNIFICANT CHANGE UP (ref 0–6)
GAS PNL BLDV: SIGNIFICANT CHANGE UP
HCT VFR BLD CALC: 34.5 % — SIGNIFICANT CHANGE UP (ref 34.5–45)
HGB BLD-MCNC: 11.2 G/DL — LOW (ref 11.5–15.5)
LYMPHOCYTES # BLD AUTO: 1.5 K/UL — SIGNIFICANT CHANGE UP (ref 1–3.3)
LYMPHOCYTES # BLD AUTO: 17.9 % — SIGNIFICANT CHANGE UP (ref 13–44)
MCHC RBC-ENTMCNC: 28.5 PG — SIGNIFICANT CHANGE UP (ref 27–34)
MCHC RBC-ENTMCNC: 32.5 GM/DL — SIGNIFICANT CHANGE UP (ref 32–36)
MCV RBC AUTO: 87.8 FL — SIGNIFICANT CHANGE UP (ref 80–100)
MONOCYTES # BLD AUTO: 0.5 K/UL — SIGNIFICANT CHANGE UP (ref 0–0.9)
MONOCYTES NFR BLD AUTO: 6.3 % — SIGNIFICANT CHANGE UP (ref 2–14)
NEUTROPHILS # BLD AUTO: 5.9 K/UL — SIGNIFICANT CHANGE UP (ref 1.8–7.4)
NEUTROPHILS NFR BLD AUTO: 72.3 % — SIGNIFICANT CHANGE UP (ref 43–77)
PLATELET # BLD AUTO: 315 K/UL — SIGNIFICANT CHANGE UP (ref 150–400)
RBC # BLD: 3.93 M/UL — SIGNIFICANT CHANGE UP (ref 3.8–5.2)
RBC # FLD: 14.6 % — HIGH (ref 10.3–14.5)
WBC # BLD: 8.2 K/UL — SIGNIFICANT CHANGE UP (ref 3.8–10.5)
WBC # FLD AUTO: 8.2 K/UL — SIGNIFICANT CHANGE UP (ref 3.8–10.5)

## 2019-03-15 PROCEDURE — 99285 EMERGENCY DEPT VISIT HI MDM: CPT | Mod: 25

## 2019-03-15 PROCEDURE — 93010 ELECTROCARDIOGRAM REPORT: CPT | Mod: 59

## 2019-03-15 RX ORDER — INSULIN HUMAN 100 [IU]/ML
5 INJECTION, SOLUTION SUBCUTANEOUS ONCE
Qty: 0 | Refills: 0 | Status: COMPLETED | OUTPATIENT
Start: 2019-03-15 | End: 2019-03-15

## 2019-03-15 RX ORDER — DEXTROSE 50 % IN WATER 50 %
50 SYRINGE (ML) INTRAVENOUS ONCE
Qty: 0 | Refills: 0 | Status: COMPLETED | OUTPATIENT
Start: 2019-03-15 | End: 2019-03-15

## 2019-03-15 RX ORDER — INSULIN HUMAN 100 [IU]/ML
10 INJECTION, SOLUTION SUBCUTANEOUS ONCE
Qty: 0 | Refills: 0 | Status: DISCONTINUED | OUTPATIENT
Start: 2019-03-15 | End: 2019-03-15

## 2019-03-15 RX ORDER — CALCIUM GLUCONATE 100 MG/ML
1 VIAL (ML) INTRAVENOUS ONCE
Qty: 0 | Refills: 0 | Status: COMPLETED | OUTPATIENT
Start: 2019-03-15 | End: 2019-03-15

## 2019-03-15 RX ORDER — SODIUM CHLORIDE 9 MG/ML
1000 INJECTION, SOLUTION INTRAVENOUS ONCE
Qty: 0 | Refills: 0 | Status: COMPLETED | OUTPATIENT
Start: 2019-03-15 | End: 2019-03-15

## 2019-03-15 RX ORDER — INSULIN HUMAN 100 [IU]/ML
5 INJECTION, SOLUTION SUBCUTANEOUS ONCE
Qty: 0 | Refills: 0 | Status: DISCONTINUED | OUTPATIENT
Start: 2019-03-15 | End: 2019-03-15

## 2019-03-15 RX ORDER — FUROSEMIDE 40 MG
40 TABLET ORAL ONCE
Qty: 0 | Refills: 0 | Status: COMPLETED | OUTPATIENT
Start: 2019-03-15 | End: 2019-03-15

## 2019-03-15 RX ADMIN — INSULIN HUMAN 5 UNIT(S): 100 INJECTION, SOLUTION SUBCUTANEOUS at 23:31

## 2019-03-15 RX ADMIN — Medication 50 MILLILITER(S): at 23:30

## 2019-03-15 RX ADMIN — SODIUM CHLORIDE 2000 MILLILITER(S): 9 INJECTION, SOLUTION INTRAVENOUS at 23:32

## 2019-03-15 RX ADMIN — Medication 40 MILLIGRAM(S): at 23:31

## 2019-03-15 RX ADMIN — Medication 400 GRAM(S): at 23:31

## 2019-03-15 NOTE — ED PROVIDER NOTE - CRITICAL CARE PROVIDED
documentation/consult w/ pt's family directly relating to pts condition/interpretation of diagnostic studies/additional history taking/direct patient care (not related to procedure)

## 2019-03-15 NOTE — ED PROVIDER NOTE - PROGRESS NOTE DETAILS
Abril PGY1- K+ stable, crt up from 1.0->2.1, admit on tele to Dr. Srinivasan  PCP: JG Mathew Abril PGY1-hyper k combo given with hx and EKG with peaked T waves, K+ stable, crt up from 1.0->2.1, admit on tele to Dr. Srinivasan  PCP: JG Mathew

## 2019-03-15 NOTE — ED PROVIDER NOTE - OBJECTIVE STATEMENT
59 yoF, F PMH COPD, chronic hypoxic resp failure on home O2 3L, HTN, HLD, CAD s/p 6 stents, dCHF, T2DM, PVD presents to ED with known hyperK of 5.8 on outpatient labs 2 days ago. Crt also up to 2.1 from baseline of 1. Recently on bactrim for cellulitis. Pt states feeling well presently. No cp, sob syncope, or palpitations.

## 2019-03-15 NOTE — ED PROVIDER NOTE - ATTENDING CONTRIBUTION TO CARE
Attending MD Finney:  I personally have seen and examined this patient.  Resident note reviewed and agree on plan of care and except where noted.  See HPI, PE, and MDM for details.

## 2019-03-15 NOTE — ED PROVIDER NOTE - CLINICAL SUMMARY MEDICAL DECISION MAKING FREE TEXT BOX
Attending MD Finney: 59F with COPD on home O2 presenting with report of hyperkalemia and AURORA on outpatient labs, review of labs in our system shows K 5.9, SCr 2.1 (normal SCr), recently completed course of PO Bactrim, suspect Bactrim as culprit for hyperK and AURORA. EKG with no obvious hyperK changes but will treat with calcium, shifting agents, IV fluid bolus and lasix for kaliuresis, plan for admission

## 2019-03-15 NOTE — ED PROVIDER NOTE - PHYSICAL EXAMINATION
Attending MD Finney:    Gen: NAD, sitting up in stretcher, oriented x 3  Neck: supple, no swelling, trachea midline  CV: heart with reg rhythm, no obvious murmur appreciated   Resp: CTAB, breathing comfortably  Abd: soft, NT, ND  Extremities: extremities warm to the touch, trace LE edema  Msk: no extremity deformities or bony tenderness  Pysch: appropriate affect    Neuro: moves all extremities spontaneously, no gross motor or sensory deficits

## 2019-03-16 DIAGNOSIS — J96.12 CHRONIC RESPIRATORY FAILURE WITH HYPERCAPNIA: ICD-10-CM

## 2019-03-16 DIAGNOSIS — E87.5 HYPERKALEMIA: ICD-10-CM

## 2019-03-16 DIAGNOSIS — N17.9 ACUTE KIDNEY FAILURE, UNSPECIFIED: ICD-10-CM

## 2019-03-16 DIAGNOSIS — Z29.9 ENCOUNTER FOR PROPHYLACTIC MEASURES, UNSPECIFIED: ICD-10-CM

## 2019-03-16 DIAGNOSIS — I48.91 UNSPECIFIED ATRIAL FIBRILLATION: ICD-10-CM

## 2019-03-16 DIAGNOSIS — E87.4 MIXED DISORDER OF ACID-BASE BALANCE: ICD-10-CM

## 2019-03-16 LAB
ALBUMIN SERPL ELPH-MCNC: 4.6 G/DL — SIGNIFICANT CHANGE UP (ref 3.3–5)
ALP SERPL-CCNC: 58 U/L — SIGNIFICANT CHANGE UP (ref 40–120)
ALT FLD-CCNC: 19 U/L — SIGNIFICANT CHANGE UP (ref 10–45)
ANION GAP SERPL CALC-SCNC: 12 MMOL/L — SIGNIFICANT CHANGE UP (ref 5–17)
ANION GAP SERPL CALC-SCNC: 14 MMOL/L — SIGNIFICANT CHANGE UP (ref 5–17)
APPEARANCE UR: CLEAR — SIGNIFICANT CHANGE UP
AST SERPL-CCNC: 22 U/L — SIGNIFICANT CHANGE UP (ref 10–40)
BILIRUB SERPL-MCNC: 0.1 MG/DL — LOW (ref 0.2–1.2)
BILIRUB UR-MCNC: NEGATIVE — SIGNIFICANT CHANGE UP
BUN SERPL-MCNC: 33 MG/DL — HIGH (ref 7–23)
BUN SERPL-MCNC: 33 MG/DL — HIGH (ref 7–23)
CALCIUM SERPL-MCNC: 10.1 MG/DL — SIGNIFICANT CHANGE UP (ref 8.4–10.5)
CALCIUM SERPL-MCNC: 10.5 MG/DL — SIGNIFICANT CHANGE UP (ref 8.4–10.5)
CHLORIDE SERPL-SCNC: 91 MMOL/L — LOW (ref 96–108)
CHLORIDE SERPL-SCNC: 92 MMOL/L — LOW (ref 96–108)
CO2 SERPL-SCNC: 32 MMOL/L — HIGH (ref 22–31)
CO2 SERPL-SCNC: 34 MMOL/L — HIGH (ref 22–31)
COLOR SPEC: COLORLESS — SIGNIFICANT CHANGE UP
CREAT SERPL-MCNC: 1.97 MG/DL — HIGH (ref 0.5–1.3)
CREAT SERPL-MCNC: 2.18 MG/DL — HIGH (ref 0.5–1.3)
DIFF PNL FLD: NEGATIVE — SIGNIFICANT CHANGE UP
GLUCOSE SERPL-MCNC: 188 MG/DL — HIGH (ref 70–99)
GLUCOSE SERPL-MCNC: 82 MG/DL — SIGNIFICANT CHANGE UP (ref 70–99)
GLUCOSE UR QL: NEGATIVE — SIGNIFICANT CHANGE UP
KETONES UR-MCNC: NEGATIVE — SIGNIFICANT CHANGE UP
LEUKOCYTE ESTERASE UR-ACNC: NEGATIVE — SIGNIFICANT CHANGE UP
NITRITE UR-MCNC: NEGATIVE — SIGNIFICANT CHANGE UP
PH UR: 6.5 — SIGNIFICANT CHANGE UP (ref 5–8)
POTASSIUM SERPL-MCNC: 4.9 MMOL/L — SIGNIFICANT CHANGE UP (ref 3.5–5.3)
POTASSIUM SERPL-MCNC: 5.6 MMOL/L — HIGH (ref 3.5–5.3)
POTASSIUM SERPL-SCNC: 4.9 MMOL/L — SIGNIFICANT CHANGE UP (ref 3.5–5.3)
POTASSIUM SERPL-SCNC: 5.6 MMOL/L — HIGH (ref 3.5–5.3)
PROT SERPL-MCNC: 7.5 G/DL — SIGNIFICANT CHANGE UP (ref 6–8.3)
PROT UR-MCNC: NEGATIVE — SIGNIFICANT CHANGE UP
SODIUM SERPL-SCNC: 137 MMOL/L — SIGNIFICANT CHANGE UP (ref 135–145)
SODIUM SERPL-SCNC: 138 MMOL/L — SIGNIFICANT CHANGE UP (ref 135–145)
SP GR SPEC: 1.01 — LOW (ref 1.01–1.02)
UROBILINOGEN FLD QL: NEGATIVE — SIGNIFICANT CHANGE UP

## 2019-03-16 PROCEDURE — 99223 1ST HOSP IP/OBS HIGH 75: CPT

## 2019-03-16 RX ORDER — MONTELUKAST 4 MG/1
10 TABLET, CHEWABLE ORAL DAILY
Qty: 0 | Refills: 0 | Status: DISCONTINUED | OUTPATIENT
Start: 2019-03-16 | End: 2019-03-18

## 2019-03-16 RX ORDER — METFORMIN HYDROCHLORIDE 850 MG/1
1 TABLET ORAL
Qty: 0 | Refills: 0 | COMMUNITY

## 2019-03-16 RX ORDER — ASPIRIN/CALCIUM CARB/MAGNESIUM 324 MG
81 TABLET ORAL DAILY
Qty: 0 | Refills: 0 | Status: DISCONTINUED | OUTPATIENT
Start: 2019-03-16 | End: 2019-03-18

## 2019-03-16 RX ORDER — PANTOPRAZOLE SODIUM 20 MG/1
40 TABLET, DELAYED RELEASE ORAL
Qty: 0 | Refills: 0 | Status: DISCONTINUED | OUTPATIENT
Start: 2019-03-16 | End: 2019-03-18

## 2019-03-16 RX ORDER — THEOPHYLLINE ANHYDROUS 200 MG
400 TABLET, EXTENDED RELEASE 12 HR ORAL ONCE
Qty: 0 | Refills: 0 | Status: COMPLETED | OUTPATIENT
Start: 2019-03-16 | End: 2019-03-16

## 2019-03-16 RX ORDER — DILTIAZEM HCL 120 MG
240 CAPSULE, EXT RELEASE 24 HR ORAL DAILY
Qty: 0 | Refills: 0 | Status: DISCONTINUED | OUTPATIENT
Start: 2019-03-16 | End: 2019-03-18

## 2019-03-16 RX ORDER — IPRATROPIUM/ALBUTEROL SULFATE 18-103MCG
3 AEROSOL WITH ADAPTER (GRAM) INHALATION ONCE
Qty: 0 | Refills: 0 | Status: DISCONTINUED | OUTPATIENT
Start: 2019-03-16 | End: 2019-03-16

## 2019-03-16 RX ORDER — SODIUM CHLORIDE 9 MG/ML
1000 INJECTION INTRAMUSCULAR; INTRAVENOUS; SUBCUTANEOUS
Qty: 0 | Refills: 0 | Status: DISCONTINUED | OUTPATIENT
Start: 2019-03-16 | End: 2019-03-18

## 2019-03-16 RX ORDER — THEOPHYLLINE ANHYDROUS 200 MG
400 TABLET, EXTENDED RELEASE 12 HR ORAL DAILY
Qty: 0 | Refills: 0 | Status: DISCONTINUED | OUTPATIENT
Start: 2019-03-16 | End: 2019-03-18

## 2019-03-16 RX ORDER — INSULIN LISPRO 100/ML
0 VIAL (ML) SUBCUTANEOUS
Qty: 0 | Refills: 0 | COMMUNITY

## 2019-03-16 RX ORDER — INSULIN LISPRO 100/ML
3 VIAL (ML) SUBCUTANEOUS
Qty: 0 | Refills: 0 | COMMUNITY

## 2019-03-16 RX ORDER — BUDESONIDE AND FORMOTEROL FUMARATE DIHYDRATE 160; 4.5 UG/1; UG/1
2 AEROSOL RESPIRATORY (INHALATION)
Qty: 0 | Refills: 0 | Status: DISCONTINUED | OUTPATIENT
Start: 2019-03-16 | End: 2019-03-18

## 2019-03-16 RX ORDER — INSULIN LISPRO 100/ML
4 VIAL (ML) SUBCUTANEOUS
Qty: 0 | Refills: 0 | COMMUNITY

## 2019-03-16 RX ORDER — INSULIN LISPRO 100/ML
5 VIAL (ML) SUBCUTANEOUS
Qty: 0 | Refills: 0 | COMMUNITY

## 2019-03-16 RX ORDER — INSULIN LISPRO 100/ML
VIAL (ML) SUBCUTANEOUS
Qty: 0 | Refills: 0 | Status: DISCONTINUED | OUTPATIENT
Start: 2019-03-16 | End: 2019-03-18

## 2019-03-16 RX ORDER — IPRATROPIUM BROMIDE 0.2 MG/ML
500 SOLUTION, NON-ORAL INHALATION ONCE
Qty: 0 | Refills: 0 | Status: COMPLETED | OUTPATIENT
Start: 2019-03-16 | End: 2019-03-16

## 2019-03-16 RX ORDER — ATORVASTATIN CALCIUM 80 MG/1
40 TABLET, FILM COATED ORAL AT BEDTIME
Qty: 0 | Refills: 0 | Status: DISCONTINUED | OUTPATIENT
Start: 2019-03-16 | End: 2019-03-18

## 2019-03-16 RX ORDER — APIXABAN 2.5 MG/1
5 TABLET, FILM COATED ORAL EVERY 12 HOURS
Qty: 0 | Refills: 0 | Status: DISCONTINUED | OUTPATIENT
Start: 2019-03-16 | End: 2019-03-18

## 2019-03-16 RX ORDER — BUDESONIDE AND FORMOTEROL FUMARATE DIHYDRATE 160; 4.5 UG/1; UG/1
2 AEROSOL RESPIRATORY (INHALATION) ONCE
Qty: 0 | Refills: 0 | Status: COMPLETED | OUTPATIENT
Start: 2019-03-16 | End: 2019-03-16

## 2019-03-16 RX ORDER — DILTIAZEM HCL 120 MG
240 CAPSULE, EXT RELEASE 24 HR ORAL ONCE
Qty: 0 | Refills: 0 | Status: COMPLETED | OUTPATIENT
Start: 2019-03-16 | End: 2019-03-16

## 2019-03-16 RX ORDER — ISOSORBIDE MONONITRATE 60 MG/1
1 TABLET, EXTENDED RELEASE ORAL
Qty: 0 | Refills: 0 | COMMUNITY

## 2019-03-16 RX ORDER — APIXABAN 2.5 MG/1
2.5 TABLET, FILM COATED ORAL ONCE
Qty: 0 | Refills: 0 | Status: COMPLETED | OUTPATIENT
Start: 2019-03-16 | End: 2019-03-16

## 2019-03-16 RX ORDER — INSULIN GLARGINE 100 [IU]/ML
8 INJECTION, SOLUTION SUBCUTANEOUS
Qty: 0 | Refills: 0 | COMMUNITY

## 2019-03-16 RX ADMIN — Medication 500 MICROGRAM(S): at 09:20

## 2019-03-16 RX ADMIN — APIXABAN 2.5 MILLIGRAM(S): 2.5 TABLET, FILM COATED ORAL at 15:07

## 2019-03-16 RX ADMIN — SODIUM CHLORIDE 80 MILLILITER(S): 9 INJECTION INTRAMUSCULAR; INTRAVENOUS; SUBCUTANEOUS at 20:16

## 2019-03-16 RX ADMIN — Medication 240 MILLIGRAM(S): at 15:07

## 2019-03-16 RX ADMIN — BUDESONIDE AND FORMOTEROL FUMARATE DIHYDRATE 2 PUFF(S): 160; 4.5 AEROSOL RESPIRATORY (INHALATION) at 20:15

## 2019-03-16 RX ADMIN — Medication 81 MILLIGRAM(S): at 18:08

## 2019-03-16 RX ADMIN — ATORVASTATIN CALCIUM 40 MILLIGRAM(S): 80 TABLET, FILM COATED ORAL at 23:31

## 2019-03-16 RX ADMIN — MONTELUKAST 10 MILLIGRAM(S): 4 TABLET, CHEWABLE ORAL at 18:08

## 2019-03-16 RX ADMIN — BUDESONIDE AND FORMOTEROL FUMARATE DIHYDRATE 2 PUFF(S): 160; 4.5 AEROSOL RESPIRATORY (INHALATION) at 15:06

## 2019-03-16 RX ADMIN — Medication 400 MILLIGRAM(S): at 15:06

## 2019-03-16 NOTE — ED ADULT NURSE REASSESSMENT NOTE - NS ED NURSE REASSESS COMMENT FT1
Report received from CELESTE Pinon. Pt a&oX4 resting comfortably in bed in no apparent distress. Pt remains on 3L NC, which is her baseline. Bedside commode available for use. Fluids running. IV site remains intact with no redness or swelling noted to site. Pt has no complaints at this time. Pt given inhaler for use. Awaiting room assignment. Safety maintained.

## 2019-03-16 NOTE — H&P ADULT - NSICDXPASTMEDICALHX_GEN_ALL_CORE_FT
PAST MEDICAL HISTORY:  Chronic sinusitis     Claustrophobia     COPD (chronic obstructive pulmonary disease)     DM (diabetes mellitus)     HTN (hypertension)     Hyperlipemia     Raynaud phenomenon

## 2019-03-16 NOTE — H&P ADULT - NSHPLABSRESULTS_GEN_ALL_CORE
137  |  91<L>  |  33<H>  ----------------------------<  82  5.6<H>   |  32<H>  |  1.97<H>    Ca    10.1      16 Mar 2019 14:53    TPro  7.5  /  Alb  4.6  /  TBili  0.1<L>  /  DBili  x   /  AST  22  /  ALT  19  /  AlkPhos  58  03-15                            11.2   8.2   )-----------( 315      ( 15 Mar 2019 23:44 )             34.5             LIVER FUNCTIONS - ( 15 Mar 2019 23:44 )  Alb: 4.6 g/dL / Pro: 7.5 g/dL / ALK PHOS: 58 U/L / ALT: 19 U/L / AST: 22 U/L / GGT: x                 Urinalysis Basic - ( 16 Mar 2019 01:45 )    Color: Colorless / Appearance: Clear / S.009 / pH: x  Gluc: x / Ketone: Negative  / Bili: Negative / Urobili: Negative   Blood: x / Protein: Negative / Nitrite: Negative   Leuk Esterase: Negative / RBC: x / WBC x   Sq Epi: x / Non Sq Epi: x / Bacteria: x

## 2019-03-16 NOTE — H&P ADULT - HISTORY OF PRESENT ILLNESS
59 yoF, F PMH COPD, chronic hypoxic resp failure on home O2 3L, HTN, HLD, CAD s/p 6 stents, dCHF, T2DM, PVD presents to ED for hyperkalemia on outpt labs and AURORA all after starting Bactrim for minor cellulitis 8 days ago.  She is not having any symptoms, overall feels well.  She is on home O2 and says her breathing is at baseline but is always SOB especially with exertion.  She has never taken Bactrim before and says that she has not had CKD history.  No dysuria, still making normal amounts of urine, no chest pain or palpitations. She says she has not been using her bipap 2/2 discomfort.     In ED:  T 98.6 HR 79 /52 RR 17 O2 100% on 3L NC

## 2019-03-16 NOTE — H&P ADULT - NSICDXPROBLEM_GEN_ALL_CORE_FT
PROBLEM DIAGNOSES  Problem: Afib  Assessment and Plan: Continue Eliquis give Cardizem home dose now 240mg ER.     Problem: Hyperkalemia  Assessment and Plan: 2/2 Bactrim use with Benicar.  Will D/C bactrim as cellulitis resolved and hold ARB for now.   Stat EKG- no peaked Ts on previous EKG.    Problem: AURORA (acute kidney injury)  Assessment and Plan: AURORA 2/2 ARB with Bactrim use.  Gentle hydration with NS @80cc/hr and repeat BMP      Problem: Acid-base disorder, mixed  Assessment and Plan: Respiratory acidosis with metabolic alkalosis compensation due to end stage COPD.  Given her pH 7.26 (vbg) this acidosis could also be minorly contributing to her hyperkalemia.  Will encourage Bipap use at night.     Problem: Chronic hypercapnic respiratory failure  Assessment and Plan: Endstate COPD on home O2, keep O2 88-92% on NC  Continue  Symbicort and Thoephylline    Problem: Prophylactic measure  Assessment and Plan: Eliquis for afib PROBLEM DIAGNOSES  Problem: Afib  Assessment and Plan: Continue Eliquis give Cardizem home dose now 240mg ER.     Problem: Hyperkalemia  Assessment and Plan: 2/2 Bactrim use with Benicar.  Will D/C bactrim as cellulitis resolved and hold ARB for now.   Stat EKG- no peaked Ts on previous EKG.  If K is normal pt can D/C, AURORA already improving.     Problem: AURORA (acute kidney injury)  Assessment and Plan: AURORA 2/2 ARB with Bactrim use.  Gentle hydration with NS @80cc/hr and repeat BMP      Problem: Acid-base disorder, mixed  Assessment and Plan: Respiratory acidosis with metabolic alkalosis compensation due to end stage COPD.  Given her pH 7.26 (vbg) this acidosis could also be minorly contributing to her hyperkalemia.  Will encourage Bipap use at night.     Problem: Chronic hypercapnic respiratory failure  Assessment and Plan: Endstate COPD on home O2, keep O2 88-92% on NC  Continue  Symbicort and Thoephylline    Problem: Prophylactic measure  Assessment and Plan: Eliquis for afib

## 2019-03-16 NOTE — ED ADULT NURSE REASSESSMENT NOTE - NS ED NURSE REASSESS COMMENT FT1
spoke with NP kaden re: order for home meds, will await for hospitalist to see pt and order home meds, pt upset re: not getting home meds

## 2019-03-16 NOTE — ED ADULT NURSE NOTE - OBJECTIVE STATEMENT
58y/o female presented to the ED from home with complaint of abnormal labs. PMH- CABGx6 on eliquis, on carti 58y/o female presented to the ED from home with complaint of abnormal labs. PMH- COPD, chronic hypoxic resp failure on home O2 3L NC, HTN, HLD, CAD s/p 6 stents on Eliquis, CHF, T2DM, PVD. Patient had routine blood work done x2 days ago, PCP contacted patient tonight to inform patient of hyper K and elevated creatine level. Patient denies chest pain, SOB, fever, chills, N/V/D, urinary symptoms. Patients states she was recently placed on bactrim for cellulitis. Patient placed on cardiac monitor, EKG performed. Patient resting in bed comfortably, no acute distress noted.

## 2019-03-16 NOTE — ED ADULT NURSE REASSESSMENT NOTE - NS ED NURSE REASSESS COMMENT FT1
Pt a&oX4 resting comfortably in bed in no apparent distress, watching TV. VSS. Pt with no complaints of SOB at this time; remains on 3L NC. awaiting room assignment

## 2019-03-16 NOTE — ED ADULT NURSE REASSESSMENT NOTE - NS ED NURSE REASSESS COMMENT FT1
Patient resting comfortably in bed, no complaints at this time, waiting to bed admitted. Patient voided using bedside commode, appx 800ml.

## 2019-03-16 NOTE — H&P ADULT - ASSESSMENT
59 yoF, F PMH COPD, chronic hypoxic resp failure on home O2 3L, HTN, HLD, CAD s/p 6 stents, dCHF, T2DM, PVD presents to ED for hyperkalemia.

## 2019-03-16 NOTE — H&P ADULT - NSHPPHYSICALEXAM_GEN_ALL_CORE
ICU Vital Signs Last 24 Hrs  T(C): 36.9 (16 Mar 2019 12:58), Max: 37 (15 Mar 2019 23:50)  T(F): 98.4 (16 Mar 2019 12:58), Max: 98.6 (15 Mar 2019 23:50)  HR: 75 (16 Mar 2019 12:58) (75 - 87)  BP: 118/81 (16 Mar 2019 12:58) (100/73 - 124/72)  BP(mean): --  ABP: --  ABP(mean): --  RR: 19 (16 Mar 2019 12:58) (16 - 20)  SpO2: 98% (16 Mar 2019 12:58) (98% - 100%)

## 2019-03-17 ENCOUNTER — TRANSCRIPTION ENCOUNTER (OUTPATIENT)
Age: 59
End: 2019-03-17

## 2019-03-17 DIAGNOSIS — I10 ESSENTIAL (PRIMARY) HYPERTENSION: ICD-10-CM

## 2019-03-17 DIAGNOSIS — D64.9 ANEMIA, UNSPECIFIED: ICD-10-CM

## 2019-03-17 DIAGNOSIS — N17.9 ACUTE KIDNEY FAILURE, UNSPECIFIED: ICD-10-CM

## 2019-03-17 LAB
ANION GAP SERPL CALC-SCNC: 12 MMOL/L — SIGNIFICANT CHANGE UP (ref 5–17)
BUN SERPL-MCNC: 36 MG/DL — HIGH (ref 7–23)
CALCIUM SERPL-MCNC: 10 MG/DL — SIGNIFICANT CHANGE UP (ref 8.4–10.5)
CHLORIDE SERPL-SCNC: 92 MMOL/L — LOW (ref 96–108)
CO2 SERPL-SCNC: 33 MMOL/L — HIGH (ref 22–31)
CREAT SERPL-MCNC: 1.97 MG/DL — HIGH (ref 0.5–1.3)
GLUCOSE SERPL-MCNC: 86 MG/DL — SIGNIFICANT CHANGE UP (ref 70–99)
HBA1C BLD-MCNC: 5.4 % — SIGNIFICANT CHANGE UP (ref 4–5.6)
HCT VFR BLD CALC: 32.9 % — LOW (ref 34.5–45)
HCV AB S/CO SERPL IA: 0.18 S/CO — SIGNIFICANT CHANGE UP (ref 0–0.79)
HCV AB SERPL-IMP: SIGNIFICANT CHANGE UP
HGB BLD-MCNC: 9.8 G/DL — LOW (ref 11.5–15.5)
MAGNESIUM SERPL-MCNC: 2.2 MG/DL — SIGNIFICANT CHANGE UP (ref 1.6–2.6)
MCHC RBC-ENTMCNC: 27.1 PG — SIGNIFICANT CHANGE UP (ref 27–34)
MCHC RBC-ENTMCNC: 29.8 GM/DL — LOW (ref 32–36)
MCV RBC AUTO: 90.9 FL — SIGNIFICANT CHANGE UP (ref 80–100)
PLATELET # BLD AUTO: 285 K/UL — SIGNIFICANT CHANGE UP (ref 150–400)
POTASSIUM SERPL-MCNC: 4.8 MMOL/L — SIGNIFICANT CHANGE UP (ref 3.5–5.3)
POTASSIUM SERPL-SCNC: 4.8 MMOL/L — SIGNIFICANT CHANGE UP (ref 3.5–5.3)
RBC # BLD: 3.62 M/UL — LOW (ref 3.8–5.2)
RBC # FLD: 14.1 % — SIGNIFICANT CHANGE UP (ref 10.3–14.5)
SODIUM SERPL-SCNC: 137 MMOL/L — SIGNIFICANT CHANGE UP (ref 135–145)
WBC # BLD: 6.04 K/UL — SIGNIFICANT CHANGE UP (ref 3.8–10.5)
WBC # FLD AUTO: 6.04 K/UL — SIGNIFICANT CHANGE UP (ref 3.8–10.5)

## 2019-03-17 PROCEDURE — 99223 1ST HOSP IP/OBS HIGH 75: CPT | Mod: GC

## 2019-03-17 PROCEDURE — 99233 SBSQ HOSP IP/OBS HIGH 50: CPT

## 2019-03-17 RX ORDER — IPRATROPIUM BROMIDE 0.2 MG/ML
500 SOLUTION, NON-ORAL INHALATION ONCE
Qty: 0 | Refills: 0 | Status: COMPLETED | OUTPATIENT
Start: 2019-03-17 | End: 2019-03-17

## 2019-03-17 RX ORDER — SODIUM CHLORIDE 9 MG/ML
1000 INJECTION INTRAMUSCULAR; INTRAVENOUS; SUBCUTANEOUS
Qty: 0 | Refills: 0 | Status: DISCONTINUED | OUTPATIENT
Start: 2019-03-17 | End: 2019-03-18

## 2019-03-17 RX ADMIN — Medication 500 MICROGRAM(S): at 06:35

## 2019-03-17 RX ADMIN — PANTOPRAZOLE SODIUM 40 MILLIGRAM(S): 20 TABLET, DELAYED RELEASE ORAL at 10:11

## 2019-03-17 RX ADMIN — Medication 400 MILLIGRAM(S): at 22:22

## 2019-03-17 RX ADMIN — Medication 81 MILLIGRAM(S): at 12:25

## 2019-03-17 RX ADMIN — APIXABAN 5 MILLIGRAM(S): 2.5 TABLET, FILM COATED ORAL at 18:11

## 2019-03-17 RX ADMIN — ATORVASTATIN CALCIUM 40 MILLIGRAM(S): 80 TABLET, FILM COATED ORAL at 22:22

## 2019-03-17 RX ADMIN — SODIUM CHLORIDE 90 MILLILITER(S): 9 INJECTION INTRAMUSCULAR; INTRAVENOUS; SUBCUTANEOUS at 18:14

## 2019-03-17 RX ADMIN — APIXABAN 5 MILLIGRAM(S): 2.5 TABLET, FILM COATED ORAL at 05:43

## 2019-03-17 RX ADMIN — BUDESONIDE AND FORMOTEROL FUMARATE DIHYDRATE 2 PUFF(S): 160; 4.5 AEROSOL RESPIRATORY (INHALATION) at 06:36

## 2019-03-17 RX ADMIN — BUDESONIDE AND FORMOTEROL FUMARATE DIHYDRATE 2 PUFF(S): 160; 4.5 AEROSOL RESPIRATORY (INHALATION) at 18:12

## 2019-03-17 RX ADMIN — MONTELUKAST 10 MILLIGRAM(S): 4 TABLET, CHEWABLE ORAL at 18:12

## 2019-03-17 RX ADMIN — SODIUM CHLORIDE 90 MILLILITER(S): 9 INJECTION INTRAMUSCULAR; INTRAVENOUS; SUBCUTANEOUS at 22:22

## 2019-03-17 RX ADMIN — Medication 240 MILLIGRAM(S): at 12:25

## 2019-03-17 NOTE — ED ADULT NURSE REASSESSMENT NOTE - NS ED NURSE REASSESS COMMENT FT1
pt pending repeat lab results and clinical decision to possibly d/c pt. remain on continuous bipap. VS stable. safety maintained.

## 2019-03-17 NOTE — CONSULT NOTE ADULT - NSICDXPROBLEM_GEN_ALL_CORE_FT
PROBLEM DIAGNOSES  Problem: AURORA (acute kidney injury)  Recommendation: Pt. with likely hemodynamically mediated AURORA in the setting of Benicar, Bactrim, and mild dehydration. Upon review of [facility], patient with normal Scr in the past, however with elevated Scr today of 1.97. Recommend IVFs overnight and rechecking BMP in AM. Hold Benicar and Lasix. Discontinue Bactrim. Monitor UOP. Avoid nephrotoxins.    Problem: HTN (hypertension)  Recommendation: Pt. with history of hypertension treated with Benicar and Lasix. Hold both medications. Start amlodipine 5mg daily. Monitor BP.

## 2019-03-17 NOTE — DISCHARGE NOTE PROVIDER - HOSPITAL COURSE
59 yoF, F PMH COPD, chronic hypoxic resp failure on home O2 3L, HTN, HLD, CAD s/p 6 stents, dCHF, T2DM, PVD presents to ED for hyperkalemia on outpt labs and AURORA all after starting Bactrim for minor cellulitis 8 days ago.  She is not having any symptoms, overall feels well.  She is on home O2 and says her breathing is at baseline but is always SOB especially with exertion.  She has never taken Bactrim before and says that she has not had CKD history.  No dysuria, still making normal amounts of urine, no chest pain or palpitations. She says she has not been using her bipap 2/2 discomfort. 59 yoF, F PMH COPD, chronic hypoxic resp failure on home O2 3L, HTN, HLD, CAD s/p 6 stents, dCHF, T2DM, PVD presents to ED for hyperkalemia on outpt labs and AURORA all after starting Bactrim for minor cellulitis 8 days ago.  She is not having any symptoms, overall feels well.  She is on home O2 and says her breathing is at baseline but is always SOB especially with exertion.  She has never taken Bactrim before and says that she has not had CKD history.  No dysuria, still making normal amounts of urine, no chest pain or palpitations. She says she has not been using her bipap 2/2 discomfort.     Nephrology following - lasix and benicar held.  Creatinine improving.  Potassium within normal limits.    BP decreased - Cardizem lowered.        Pt stable for discharge with PMD, cardiology, and nephrology follow up. 59 F PMH COPD, chronic hypoxic respiratory failure on home O2 (3L), HTN, HLD, CAD s/p 6 stents, chronic diastolic CHF, afib (per chart, patient unsure), T2DM (HbA1c 5.4), PVD, recent course of bactrim as outpatient admitted to the hospital for hyperkalemia and AURORA.         Problem: AURORA (acute kidney injury)    - seen by nephrology    - serum cr improved with IVF    - Benicar and lasix discontinued        Problem: Hyperkalemia    - resolved with IVF        Problem: Type 2 diabetes mellitus    - HbA1c 5.4    - Advised patient to discontinue metformin on discharge.        Problem: HTN (hypertension)    - SBP remained marginal    - Benicar and lasix discontinued    - Per discussion with cards (Dr. Brunson)--> decreased cardizem from 240mg to 180mg QD        Problem: COPD without exacerbation    - Continue with home O2 and nocturnal BIPAP    - Continue home meds        Problem: CAD (coronary artery disease)    - Continue aspirin, statin        Problem: Afib    - Documented per chart but patient unsure and not sure why she is on eliquis    - continue eliquis and cardizem.        Renal function overall improved and patient discharged home with plans to follow up as outpatient for repeat blood work this week.

## 2019-03-17 NOTE — DISCHARGE NOTE PROVIDER - NSDCCPCAREPLAN_GEN_ALL_CORE_FT
PRINCIPAL DISCHARGE DIAGNOSIS  Diagnosis: Hyperkalemia  Assessment and Plan of Treatment: Resolved  You will need to follow up with a nephrologist within one week of discharge.  You can follow up with Dr. Leigh, who saw you during your hospitalization - please call to make an appointment.      SECONDARY DISCHARGE DIAGNOSES  Diagnosis: Hypotension  Assessment and Plan of Treatment: Your blood pressure has been low during admission.  Your cardizem is being decreased upon discharge.    You will need to follow up with your cardiologist, Dr. Chapa, within one week of discharge - please call to make an appointment.  At this time, you will discuss your current medication regimen and if any changes need to be made.    Diagnosis: Acute kidney injury  Assessment and Plan of Treatment: You will need to follow up with a nephrologist within one week of discharge - please call to make an appointment.  You will need to follow up with your primary medical doctor, Dr. Dutton    Diagnosis: Afib  Assessment and Plan of Treatment: You will need to follow up with your cardiologist, Dr. Chapa, within one week of discharge - please call to make an appointment.  Atrial fibrillation is the most common heart rhythm problem.  The condition puts you at risk for has stroke and heart attack  It helps if you control your blood pressure, not drink more than 1-2 alcohol drinks per day, cut down on caffeine, getting treatment for over active thyroid gland, and get regular exercise  Call your doctor if you feel your heart racing or beating unusually, chest tightness or pain, lightheaded, faint, shortness of breath especially with exercise  It is important to take your heart medication as prescribed  You may be on anticoagulation which is very important to take as directed - you may need blood work to monitor drug levels      Diagnosis: COPD without exacerbation  Assessment and Plan of Treatment: You will need to follow up with your pulmonologist, Dr. Mathew, within one week of discharge - please call to make an appointment. PRINCIPAL DISCHARGE DIAGNOSIS  Diagnosis: Hyperkalemia  Assessment and Plan of Treatment: Resolved.  Low potassium diet  You will need to follow up with a nephrologist within one week of discharge.  You can follow up with Dr. Leigh, [phone], who saw you during your hospitalization - please call to make an appointment.  At this time, you will discuss your creatinine/potassium levels and medications.      SECONDARY DISCHARGE DIAGNOSES  Diagnosis: Hypotension  Assessment and Plan of Treatment: Your blood pressure has been low during admission.  Your cardizem is being decreased upon discharge.    You will need to follow up with your cardiologist, Dr. Chapa, within one week of discharge - please call to make an appointment.  At this time, you will discuss your current medication regimen and if any changes need to be made.    Diagnosis: Acute kidney injury  Assessment and Plan of Treatment: You will stop your Benicar and you lasix for now.    You will need to follow up with a nephrologist within one week of discharge - please call to make an appointment.  You will need to follow up with your primary medical doctor, Dr. Mathew, within one week of discharge.  At this time, you will have your creatinine/potassium levels checked and discuss if any adjustments need to be made to your medication regimen, such as possibly restarting your lasix.    Diagnosis: Afib  Assessment and Plan of Treatment: You will need to follow up with your cardiologist, Dr. Chapa, within one week of discharge - please call to make an appointment.  Atrial fibrillation is the most common heart rhythm problem.  The condition puts you at risk for has stroke and heart attack  It helps if you control your blood pressure, not drink more than 1-2 alcohol drinks per day, cut down on caffeine, getting treatment for over active thyroid gland, and get regular exercise  Call your doctor if you feel your heart racing or beating unusually, chest tightness or pain, lightheaded, faint, shortness of breath especially with exercise  It is important to take your heart medication as prescribed  You may be on anticoagulation which is very important to take as directed - you may need blood work to monitor drug levels      Diagnosis: Type 2 diabetes mellitus  Assessment and Plan of Treatment: You will stop your taking your Metformin.  You will follow up with Dr. Mathew within one week and discuss your current medication regimen.  HgA1C this admission - is 5.2  Make sure you get your HgA1c checked every three months.  If you take oral diabetes medications, check your blood glucose two times a day.  If you take insulin, check your blood glucose before meals and at bedtime.  It's important not to skip any meals.  Keep a log of your blood glucose results and always take it with you to your doctor appointments.  Keep a list of your current medications including injectables and over the counter medications and bring this medication list with you to all your doctor appointments.  If you have not seen your ophthalmologist this year call for appointment.  Check your feet daily for redness, sores, or openings. Do not self treat. If no improvement in two days call your primary care physician for an appointment.  Low blood sugar (hypoglycemia) is a blood sugar below 70mg/dl. Check your blood sugar if you feel signs/symptoms of hypoglycemia. If your blood sugar is below 70 take 15 grams of carbohydrates (ex 4 oz of apple juice, 3-4 glucose tablets, or 4-6 oz of regular soda) wait 15 minutes and repeat blood sugar to make sure it comes up above 70.  If your blood sugar is above 70 and you are due for a meal, have a meal.  If you are not due for a meal have a snack.  This snack helps keeps your blood sugar at a safe range.      Diagnosis: COPD without exacerbation  Assessment and Plan of Treatment: You will need to follow up with your pulmonologist, Dr. Mathew, within one week of discharge - please call to make an appointment.

## 2019-03-17 NOTE — DISCHARGE NOTE PROVIDER - CARE PROVIDERS DIRECT ADDRESSES
,[email or web address],[email or web address],DirectAddress_Unknown

## 2019-03-17 NOTE — DISCHARGE NOTE PROVIDER - PROVIDER TOKENS
PROVIDER:[TOKEN:[[id number]]] PROVIDER:[TOKEN:[[id number]]],PROVIDER:[TOKEN:[[id number]]],PROVIDER:[TOKEN:[[id number]]]

## 2019-03-17 NOTE — PROGRESS NOTE ADULT - NSICDXPROBLEM_GEN_ALL_CORE_FT
PROBLEM DIAGNOSES  Problem: AURORA (acute kidney injury)  Assessment and Plan: Improving     Problem: Hyperkalemia  Assessment and Plan: Improved   COntinue to monitor   Avoid Bactrim     Problem: Normocytic anemia  Assessment and Plan: Current Hemo= 9.8  as noted in 1/2019 Her hem was 9.7   will need to f/up with PCP for anemia work up and follow up PROBLEM DIAGNOSES  Problem: AURORA (acute kidney injury)  Assessment and Plan: Improving / etiology could be secondary to recent exposure to TMP/SMX   Pt received IVF   Nephrology consult was called / Awaiting on recommendation  will continue IVF , repeat creatinine  Avoid Nephrotoxins    Problem: Hyperkalemia  Assessment and Plan: Improved   COntinue to monitor       Problem: Normocytic anemia  Assessment and Plan: Current Hemo= 9.8  as noted in 1/2019 Her hem was 9.7   NO bleeding reported   will need to f/up with PCP for anemia work up and follow up     Problem: Afib  Assessment and Plan: Rate is controlled   continue Eliquis     Problem: Chronic hypercapnic respiratory failure  Assessment and Plan: On  chronic home oxygen   cont Symicort   ON THeophylline

## 2019-03-17 NOTE — ED ADULT NURSE REASSESSMENT NOTE - NS ED NURSE REASSESS COMMENT FT1
Pt tolerating Bipap/cpap well; pt requesting respiratory therapy again at this time; respiratory tech contacted

## 2019-03-17 NOTE — CONSULT NOTE ADULT - ASSESSMENT
59 yoF, F PMH COPD, chronic hypoxic resp failure on home O2 3L, HTN, HLD, CAD s/p 6 stents, dCHF, T2DM, PVD presents to ED for hyperkalemia and AURORA      -CV status is stable  -hold acei/arb  -K+/Cr already improved  -cont CCB  -continue Eliquis

## 2019-03-17 NOTE — CONSULT NOTE ADULT - ATTENDING COMMENTS
AURORA due to poor po intake+ Benicar use superimposed by Bactrim use for cellulitis. Give normal saline 100cc/hr times 1 L overnight. Check urinalysis with microscopy. Replace Benicar with Amlodipine 5 mg daily. BMP tomorrow.

## 2019-03-17 NOTE — CONSULT NOTE ADULT - SUBJECTIVE AND OBJECTIVE BOX
[facility] DIVISION OF KIDNEY DISEASES AND HYPERTENSION -- INITIAL CONSULT NOTE  --------------------------------------------------------------------------------  HPI: 60 yo F with HTN, DM, and end-stage COPD requiring home O2 admitted for AURORA. Patient had outpatient labs completed on 3/14/19 and was found to have hyperkalemia and elevated Scr, therefore told to come to ER on 3/15/19 for further evaluation. Patient was found to have a Scr of 2.18 on 3/15/19, which has slowly improved to 1.97 today. Nephrology consulted for AURORA.    Patient seen and examined at bedside in ER. Patient states that she has no history of kidney disease, including family members with kidney disorders. For HTN treatment, patient has been on Benicar. Additionally feels thirsty and dehydrated with ? of decreased PO intake in past couple of days. Patient recently diagnosed with cellulitis and was started on Bactrim on 3/6/19 with last dose completed a few days ago. Upon review of [facility], patient has had normal Scr. Patient currently denies N/V/F/C, CP, and dysuria.    PAST HISTORY  --------------------------------------------------------------------------------  PAST MEDICAL & SURGICAL HISTORY:  Hyperlipemia  Chronic sinusitis  Raynaud phenomenon  HTN (hypertension)  DM (diabetes mellitus)  Claustrophobia  COPD (chronic obstructive pulmonary disease)  S/P tonsillectomy    FAMILY HISTORY:  FH: MI (myocardial infarction)  FH: CABG (coronary artery bypass surgery)  Denies family history of kidney disease    PAST SOCIAL HISTORY:  Denies current alcohol/tobacco use    ALLERGIES & MEDICATIONS  --------------------------------------------------------------------------------  Allergies    No Known Allergies    Intolerances    albuterol (Unknown)    Standing Inpatient Medications  apixaban 5 milliGRAM(s) Oral every 12 hours  aspirin  chewable 81 milliGRAM(s) Oral daily  atorvastatin 40 milliGRAM(s) Oral at bedtime  buDESOnide  80 MICROgram(s)/formoterol 4.5 MICROgram(s) Inhaler 2 Puff(s) Inhalation two times a day  diltiazem    milliGRAM(s) Oral daily  insulin lispro (HumaLOG) corrective regimen sliding scale   SubCutaneous Before meals and at bedtime  montelukast 10 milliGRAM(s) Oral daily  pantoprazole    Tablet 40 milliGRAM(s) Oral before breakfast  sodium chloride 0.9%. 1000 milliLiter(s) IV Continuous <Continuous>  sodium chloride 0.9%. 1000 milliLiter(s) IV Continuous <Continuous>  theophylline ER (24 Hour) 400 milliGRAM(s) Oral daily    REVIEW OF SYSTEMS  --------------------------------------------------------------------------------  Gen: + lethargy, + fatiguem, + thirst  Respiratory: + dyspnea  CV: No chest pain  GI: No abdominal pain  MSK: No LE edema  Neuro: No dizziness  Heme: No bleeding  Psych: No depression  Skin: + dryness    All other systems were reviewed and are negative, except as noted.    VITALS/PHYSICAL EXAM  --------------------------------------------------------------------------------  T(C): 36.5 (03-17-19 @ 08:19), Max: 36.9 (03-16-19 @ 23:34)  HR: 80 (03-17-19 @ 16:00) (75 - 86)  BP: 107/45 (03-17-19 @ 16:00) (103/83 - 122/54)  RR: 18 (03-17-19 @ 16:00) (18 - 20)  SpO2: 100% (03-17-19 @ 16:00) (95% - 100%)  Wt(kg): --  Height (cm): 162.56 (03-15-19 @ 19:49)  Weight (kg): 85.3 (03-15-19 @ 19:49)  BMI (kg/m2): 32.3 (03-15-19 @ 19:49)  BSA (m2): 1.91 (03-15-19 @ 19:49)    Physical Exam:  	Gen: NAD  	HEENT: + NC O2, supple neck  	Pulm: Diffusely decreased breath sounds b/l  	CV: S1S2; no rub  	Abd: +BS, soft, nontender/nondistended  	: No suprapubic tenderness  	LE: No edema  	Skin: Warm, without rashes  	Psych: Normal affect    LABS/STUDIES  --------------------------------------------------------------------------------              9.8    6.04  >-----------<  285      [03-17-19 @ 08:45]              32.9     137  |  92  |  36  ----------------------------<  86      [03-17-19 @ 06:28]  4.8   |  33  |  1.97        Ca     10.0     [03-17-19 @ 06:28]      Mg     2.2     [03-17-19 @ 06:28]    Creatinine Trend:  SCr 1.97 [03-17 @ 06:28]  SCr 1.97 [03-16 @ 14:53]  SCr 2.18 [03-15 @ 23:44]    Urinalysis - [03-16-19 @ 01:45]      Color Colorless / Appearance Clear / SG 1.009 / pH 6.5      Gluc Negative / Ketone Negative  / Bili Negative / Urobili Negative       Blood Negative / Protein Negative / Leuk Est Negative / Nitrite Negative      RBC  / WBC  / Hyaline  / Gran  / Sq Epi  / Non Sq Epi  / Bacteria

## 2019-03-17 NOTE — ED ADULT NURSE REASSESSMENT NOTE - NS ED NURSE REASSESS COMMENT FT1
pt a&oX4 resting comfortably in bed in no apparent distress. Pt took off bipap/cpap machine and states she "get claustrophobic" and needed a break, pt breathing spontaneously and  unlabored, SpO2 of 97% on RA. Pt with no complaints at this time. safety maintained.

## 2019-03-17 NOTE — DISCHARGE NOTE PROVIDER - CARE PROVIDER_API CALL
Anita Mathew)  Critical Care Medicine; Internal Medicine; Pulmonary Disease  [address]  [place] [address]  Phone: [phone]  Fax: [phone]  Follow Up Time: Anita Mathew)  Critical Care Medicine; Internal Medicine; Pulmonary Disease  [address]  [place] [address]  Phone: [phone]  Fax: [phone]  Follow Up Time:     Dalton Chapa)  Cardiovascular Disease; Internal Medicine  [address], [address]  [place] [address]  Phone: [phone]  Fax: [phone]  Follow Up Time:     Aleida Leigh)  Internal Medicine; Nephrology  [address] [place] [address]  Phone: [phone]  Fax: [phone]  Follow Up Time:

## 2019-03-17 NOTE — DISCHARGE NOTE PROVIDER - NSDCFUADDAPPT_GEN_ALL_CORE_FT
You will need to follow up with your primary medical doctor/pulmonologist, Dr. Mathew, within one week of discharge - please calll to make an appointment.  At this time, you will need a basic metabolic panel drawn to check your potassium and creatinine levels.  At this time, you will discuss your current medications to see if any changes need to be made (like restarting lasix).    You will need to follow up with your cardiologist, Dr. Chapa, within one week of discharge - please call to make an appointment.  At this time, you will discuss your blood pressure/current medication regimen.    You will need to follow up with a nephrologist within one week of discharge - if you do not have one, you can follow up with Dr. Leigh [phone].  Please call to make an appointment.  At this appointment, you will discuss your current creatinine/potassium levels and medications.

## 2019-03-17 NOTE — PROGRESS NOTE ADULT - SUBJECTIVE AND OBJECTIVE BOX
Patient is a 59y old  Female who presents with a chief complaint of Hyperkalemia (17 Mar 2019 08:33)      INTERVAL HPI/OVERNIGHT EVENTS:    Patient is seen and wants to go home.       Medications:MEDICATIONS  (STANDING):  apixaban 5 milliGRAM(s) Oral every 12 hours  aspirin  chewable 81 milliGRAM(s) Oral daily  atorvastatin 40 milliGRAM(s) Oral at bedtime  buDESOnide  80 MICROgram(s)/formoterol 4.5 MICROgram(s) Inhaler 2 Puff(s) Inhalation two times a day  diltiazem    milliGRAM(s) Oral daily  insulin lispro (HumaLOG) corrective regimen sliding scale   SubCutaneous Before meals and at bedtime  montelukast 10 milliGRAM(s) Oral daily  pantoprazole    Tablet 40 milliGRAM(s) Oral before breakfast  sodium chloride 0.9%. 1000 milliLiter(s) (80 mL/Hr) IV Continuous <Continuous>  theophylline ER (24 Hour) 400 milliGRAM(s) Oral daily    MEDICATIONS  (PRN):      Allergies: Allergies    No Known Allergies    Intolerances  albuterol (Unknown)      REVIEW OF SYSTEMS:  CONSTITUTIONAL: No fever  EYES: No eye pain, visual disturbances, or discharge  ENMT:  No difficulty hearing, tinnitus, vertigo; No sinus or throat pain  NECK: No pain or stiffness  BREASTS: No pain, masses, or nipple discharge  RESPIRATORY: No cough, wheezing, chills or hemoptysis; No shortness of breath  CARDIOVASCULAR: No chest pain, palpitations, dizziness, or leg swelling  GASTROINTESTINAL: No abdominal or epigastric pain. No nausea, vomiting, or hematemesis; No diarrhea or constipation. No melena or hematochezia.  GENITOURINARY: No dysuria, frequency, hematuria, or incontinence  NEUROLOGICAL: No headaches, memory loss, loss of strength, numbness, or tremors  SKIN: No itching, burning, rashes, or lesions   LYMPH NODES: No enlarged glands  ENDOCRINE: No heat or cold intolerance; No hair loss  MUSCULOSKELETAL: No joint pain or swelling; No muscle, back, or extremity pain  PSYCHIATRIC: No depression, anxiety, mood swings, or difficulty sleeping  HEME/LYMPH: No easy bruising, or bleeding gums  ALLERY AND IMMUNOLOGIC: No hives or eczema    T(C): 36.5 (19 @ 08:19), Max: 36.9 (19 @ 12:58)  HR: 78 (19 @ 09:45) (75 - 86)  BP: 103/83 (19 @ 08:19) (103/83 - 122/54)  RR: 18 (19 @ 08:19) (18 - 21)  SpO2: 96% (19 @ 09:45) (95% - 100%)  Wt(kg): --Vital Signs Last 24 Hrs  T(C): 36.5 (17 Mar 2019 08:19), Max: 36.9 (16 Mar 2019 12:58)  T(F): 97.7 (17 Mar 2019 08:19), Max: 98.5 (16 Mar 2019 23:34)  HR: 78 (17 Mar 2019 09:45) (75 - 86)  BP: 103/83 (17 Mar 2019 08:19) (103/83 - 122/54)  BP(mean): --  RR: 18 (17 Mar 2019 08:19) (18 - 21)  SpO2: 96% (17 Mar 2019 09:45) (95% - 100%)  I&O's Summary      PHYSICAL EXAM:  GENERAL: NAD, well-groomed, well-developed  HEAD:  Atraumatic, Normocephalic  EYES: EOMI, PERRLA, conjunctiva and sclera clear  ENMT: No tonsillar erythema, exudates, or enlargement; Moist mucous membranes, Good dentition, No lesions  NECK: Supple, No JVD, Normal thyroid  NERVOUS SYSTEM:  Alert & Oriented X3, Good concentration; Motor Strength 5/5 B/L upper and lower extremities; DTRs 2+ intact and symmetric  CHEST/LUNG: Clear to percussion bilaterally; No rales, rhonchi, wheezing, or rubs  HEART: Regular rate and rhythm; No murmurs, rubs, or gallops  ABDOMEN: Soft, Nontender, Nondistended; Bowel sounds present  EXTREMITIES:  2+ Peripheral Pulses, No clubbing, cyanosis, or edema  LYMPH: No lymphadenopathy noted  SKIN: No rashes or lesions    Consultant(s) Notes Reviewed:  [x ] YES  [ ] NO  Care Discussed with Consultants/Other Providers [ x] YES  [ ] NO  Name of Consultant  LABS:                        9.8    6.04  )-----------( 285      ( 17 Mar 2019 08:45 )             32.9     03-    137  |  92<L>  |  36<H>  ----------------------------<  86  4.8   |  33<H>  |  1.97<H>    Ca    10.0      17 Mar 2019 06:28  Mg     2.2     -    TPro  7.5  /  Alb  4.6  /  TBili  0.1<L>  /  DBili  x   /  AST  22  /  ALT  19  /  AlkPhos  58  03-15      Urinalysis Basic - ( 16 Mar 2019 01:45 )    Color: Colorless / Appearance: Clear / S.009 / pH: x  Gluc: x / Ketone: Negative  / Bili: Negative / Urobili: Negative   Blood: x / Protein: Negative / Nitrite: Negative   Leuk Esterase: Negative / RBC: x / WBC x   Sq Epi: x / Non Sq Epi: x / Bacteria: x      CAPILLARY BLOOD GLUCOSE      POCT Blood Glucose.: 97 mg/dL (17 Mar 2019 09:19)  POCT Blood Glucose.: 109 mg/dL (16 Mar 2019 22:56)  POCT Blood Glucose.: 131 mg/dL (16 Mar 2019 19:57)        Urinalysis Basic - ( 16 Mar 2019 01:45 )    Color: Colorless / Appearance: Clear / S.009 / pH: x  Gluc: x / Ketone: Negative  / Bili: Negative / Urobili: Negative   Blood: x / Protein: Negative / Nitrite: Negative   Leuk Esterase: Negative / RBC: x / WBC x   Sq Epi: x / Non Sq Epi: x / Bacteria: x        RADIOLOGY & ADDITIONAL TESTS:  EKG :     Imaging Personally Reviewed:  [ ] YES  [ ] NO  HEALTH ISSUES - PROBLEM Dx:  Afib  Prophylactic measure  Acid-base disorder, mixed  Chronic hypercapnic respiratory failure  AURORA (acute kidney injury)  Hyperkalemia Patient is a 59y old  Female who presents with a chief complaint of Hyperkalemia (17 Mar 2019 08:33)      INTERVAL HPI/OVERNIGHT EVENTS:    Patient is seen and wants to go home bu      Medications:MEDICATIONS  (STANDING):  apixaban 5 milliGRAM(s) Oral every 12 hours  aspirin  chewable 81 milliGRAM(s) Oral daily  atorvastatin 40 milliGRAM(s) Oral at bedtime  buDESOnide  80 MICROgram(s)/formoterol 4.5 MICROgram(s) Inhaler 2 Puff(s) Inhalation two times a day  diltiazem    milliGRAM(s) Oral daily  insulin lispro (HumaLOG) corrective regimen sliding scale   SubCutaneous Before meals and at bedtime  montelukast 10 milliGRAM(s) Oral daily  pantoprazole    Tablet 40 milliGRAM(s) Oral before breakfast  sodium chloride 0.9%. 1000 milliLiter(s) (80 mL/Hr) IV Continuous <Continuous>  theophylline ER (24 Hour) 400 milliGRAM(s) Oral daily    MEDICATIONS  (PRN):      Allergies: Allergies    No Known Allergies    Intolerances  albuterol (Unknown)      REVIEW OF SYSTEMS:  CONSTITUTIONAL: No fever  EYES: No eye pain, visual disturbances, or discharge  ENMT:  No difficulty hearing, tinnitus, vertigo; No sinus or throat pain  NECK: No pain or stiffness  BREASTS: No pain, masses, or nipple discharge  RESPIRATORY: No cough, wheezing, chills or hemoptysis; No shortness of breath  CARDIOVASCULAR: No chest pain, palpitations, dizziness, or leg swelling  GASTROINTESTINAL: No abdominal or epigastric pain. No nausea, vomiting, or hematemesis; No diarrhea or constipation. No melena or hematochezia.  GENITOURINARY: No dysuria, frequency, hematuria, or incontinence  NEUROLOGICAL: No headaches, memory loss, loss of strength, numbness, or tremors  SKIN: No itching, burning, rashes, or lesions   LYMPH NODES: No enlarged glands  ENDOCRINE: No heat or cold intolerance; No hair loss  MUSCULOSKELETAL: No joint pain or swelling; No muscle, back, or extremity pain  PSYCHIATRIC: No depression, anxiety, mood swings, or difficulty sleeping  HEME/LYMPH: No easy bruising, or bleeding gums  ALLERY AND IMMUNOLOGIC: No hives or eczema    T(C): 36.5 (19 @ 08:19), Max: 36.9 (19 @ 12:58)  HR: 78 (19 @ 09:45) (75 - 86)  BP: 103/83 (19 @ 08:19) (103/83 - 122/54)  RR: 18 (19 @ 08:19) (18 - 21)  SpO2: 96% (19 @ 09:45) (95% - 100%)  Wt(kg): --Vital Signs Last 24 Hrs  T(C): 36.5 (17 Mar 2019 08:19), Max: 36.9 (16 Mar 2019 12:58)  T(F): 97.7 (17 Mar 2019 08:19), Max: 98.5 (16 Mar 2019 23:34)  HR: 78 (17 Mar 2019 09:45) (75 - 86)  BP: 103/83 (17 Mar 2019 08:19) (103/83 - 122/54)  BP(mean): --  RR: 18 (17 Mar 2019 08:19) (18 - 21)  SpO2: 96% (17 Mar 2019 09:45) (95% - 100%)  I&O's Summary      PHYSICAL EXAM:  GENERAL: NAD, well-groomed, well-developed  HEAD:  Atraumatic, Normocephalic  EYES: EOMI, PERRLA, conjunctiva and sclera clear  ENMT: No tonsillar erythema, exudates, or enlargement; Moist mucous membranes, Good dentition, No lesions  NECK: Supple, No JVD, Normal thyroid  NERVOUS SYSTEM:  Alert & Oriented X3, Good concentration; Motor Strength 5/5 B/L upper and lower extremities; DTRs 2+ intact and symmetric  CHEST/LUNG: Clear to percussion bilaterally; No rales, rhonchi, wheezing, or rubs  HEART: Regular rate and rhythm; No murmurs, rubs, or gallops  ABDOMEN: Soft, Nontender, Nondistended; Bowel sounds present  EXTREMITIES:  2+ Peripheral Pulses, No clubbing, cyanosis, or edema  LYMPH: No lymphadenopathy noted  SKIN: No rashes or lesions    Consultant(s) Notes Reviewed:  [x ] YES  [ ] NO  Care Discussed with Consultants/Other Providers [ x] YES  [ ] NO  Name of Consultant  LABS:                        9.8    6.04  )-----------( 285      ( 17 Mar 2019 08:45 )             32.9     03-    137  |  92<L>  |  36<H>  ----------------------------<  86  4.8   |  33<H>  |  1.97<H>    Ca    10.0      17 Mar 2019 06:28  Mg     2.2     -    TPro  7.5  /  Alb  4.6  /  TBili  0.1<L>  /  DBili  x   /  AST  22  /  ALT  19  /  AlkPhos  58  03-15      Urinalysis Basic - ( 16 Mar 2019 01:45 )    Color: Colorless / Appearance: Clear / S.009 / pH: x  Gluc: x / Ketone: Negative  / Bili: Negative / Urobili: Negative   Blood: x / Protein: Negative / Nitrite: Negative   Leuk Esterase: Negative / RBC: x / WBC x   Sq Epi: x / Non Sq Epi: x / Bacteria: x      CAPILLARY BLOOD GLUCOSE      POCT Blood Glucose.: 97 mg/dL (17 Mar 2019 09:19)  POCT Blood Glucose.: 109 mg/dL (16 Mar 2019 22:56)  POCT Blood Glucose.: 131 mg/dL (16 Mar 2019 19:57)        Urinalysis Basic - ( 16 Mar 2019 01:45 )    Color: Colorless / Appearance: Clear / S.009 / pH: x  Gluc: x / Ketone: Negative  / Bili: Negative / Urobili: Negative   Blood: x / Protein: Negative / Nitrite: Negative   Leuk Esterase: Negative / RBC: x / WBC x   Sq Epi: x / Non Sq Epi: x / Bacteria: x        RADIOLOGY & ADDITIONAL TESTS:  EKG :     Imaging Personally Reviewed:  [ ] YES  [ ] NO  HEALTH ISSUES - PROBLEM Dx:  Afib  Prophylactic measure  Acid-base disorder, mixed  Chronic hypercapnic respiratory failure  AURORA (acute kidney injury)  Hyperkalemia Patient is a 59y old  Female who presents with a chief complaint of Hyperkalemia (17 Mar 2019 08:33)      INTERVAL HPI/OVERNIGHT EVENTS:    Patient is seen and wants to go home.  Patient denies any complaints except for fatigue.  NO bleed reported.         Medications:MEDICATIONS  (STANDING):  apixaban 5 milliGRAM(s) Oral every 12 hours  aspirin  chewable 81 milliGRAM(s) Oral daily  atorvastatin 40 milliGRAM(s) Oral at bedtime  buDESOnide  80 MICROgram(s)/formoterol 4.5 MICROgram(s) Inhaler 2 Puff(s) Inhalation two times a day  diltiazem    milliGRAM(s) Oral daily  insulin lispro (HumaLOG) corrective regimen sliding scale   SubCutaneous Before meals and at bedtime  montelukast 10 milliGRAM(s) Oral daily  pantoprazole    Tablet 40 milliGRAM(s) Oral before breakfast  sodium chloride 0.9%. 1000 milliLiter(s) (80 mL/Hr) IV Continuous <Continuous>  theophylline ER (24 Hour) 400 milliGRAM(s) Oral daily    MEDICATIONS  (PRN):      Allergies: Allergies    No Known Allergies    Intolerances  albuterol (Unknown)      REVIEW OF SYSTEMS:  CONSTITUTIONAL: No fever  NECK: No pain or stiffness  RESPIRATORY: No cough, wheezing, chills or hemoptysis; No current shortness of breath  CARDIOVASCULAR: No chest pain, palpitations, dizziness, or leg swelling  GASTROINTESTINAL: No abdominal or epigastric pain. No nausea, vomiting, or hematemesis; No diarrhea or constipation. No melena or hematochezia.  NEUROLOGICAL: No headaches    T(C): 36.5 (19 @ 08:19), Max: 36.9 (19 @ 12:58)  HR: 78 (19 @ 09:45) (75 - 86)  BP: 103/83 (19 @ 08:19) (103/83 - 122/54)  RR: 18 (19 @ 08:19) (18 - 21)  SpO2: 96% (19 @ 09:45) (95% - 100%)  Wt(kg): --Vital Signs Last 24 Hrs  T(C): 36.5 (17 Mar 2019 08:19), Max: 36.9 (16 Mar 2019 12:58)  T(F): 97.7 (17 Mar 2019 08:19), Max: 98.5 (16 Mar 2019 23:34)  HR: 78 (17 Mar 2019 09:45) (75 - 86)  BP: 103/83 (17 Mar 2019 08:19) (103/83 - 122/54)  BP(mean): --  RR: 18 (17 Mar 2019 08:19) (18 - 21)  SpO2: 96% (17 Mar 2019 09:45) (95% - 100%)  I&O's Summary      PHYSICAL EXAM:  GENERAL: NAD, well-groomed, well-developed  HEAD:  Atraumatic, Normocephalic  NECK: Supple, No JVD, Normal thyroid  NERVOUS SYSTEM:  Alert & Oriented X3, Good concentration  CHEST/LUNG: pos air entry b/l ; No rales, rhonchi, wheezing, or rubs  HEART: Regular rate and rhythm; No murmurs, rubs, or gallops  ABDOMEN: Soft, Nontender, Nondistended; Bowel sounds present  EXTREMITIES:  2+ Peripheral Pulses, No clubbing, cyanosis, or edema      Consultant(s) Notes Reviewed:  [x ] YES  [ ] NO  Care Discussed with Consultants/Other Providers [ x] YES  [ ] NO  Name of Consultant  LABS:                        9.8    6.04  )-----------( 285      ( 17 Mar 2019 08:45 )             32.9     03-17    137  |  92<L>  |  36<H>  ----------------------------<  86  4.8   |  33<H>  |  1.97<H>    Ca    10.0      17 Mar 2019 06:28  Mg     2.2     03-17    TPro  7.5  /  Alb  4.6  /  TBili  0.1<L>  /  DBili  x   /  AST  22  /  ALT  19  /  AlkPhos  58  03-15      Urinalysis Basic - ( 16 Mar 2019 01:45 )    Color: Colorless / Appearance: Clear / S.009 / pH: x  Gluc: x / Ketone: Negative  / Bili: Negative / Urobili: Negative   Blood: x / Protein: Negative / Nitrite: Negative   Leuk Esterase: Negative / RBC: x / WBC x   Sq Epi: x / Non Sq Epi: x / Bacteria: x      CAPILLARY BLOOD GLUCOSE      POCT Blood Glucose.: 97 mg/dL (17 Mar 2019 09:19)  POCT Blood Glucose.: 109 mg/dL (16 Mar 2019 22:56)  POCT Blood Glucose.: 131 mg/dL (16 Mar 2019 19:57)        Urinalysis Basic - ( 16 Mar 2019 01:45 )    Color: Colorless / Appearance: Clear / S.009 / pH: x  Gluc: x / Ketone: Negative  / Bili: Negative / Urobili: Negative   Blood: x / Protein: Negative / Nitrite: Negative   Leuk Esterase: Negative / RBC: x / WBC x   Sq Epi: x / Non Sq Epi: x / Bacteria: x        RADIOLOGY & ADDITIONAL TESTS:  EKG :     Imaging Personally Reviewed:  [ ] YES  [ ] NO  HEALTH ISSUES - PROBLEM Dx:  Afib  Prophylactic measure  Acid-base disorder, mixed  Chronic hypercapnic respiratory failure  AURORA (acute kidney injury)  Hyperkalemia

## 2019-03-17 NOTE — CONSULT NOTE ADULT - SUBJECTIVE AND OBJECTIVE BOX
CHIEF COMPLAINT:    HPI:  59 yoF, F PMH COPD, chronic hypoxic resp failure on home O2 3L, HTN, HLD, CAD s/p 6 stents, dCHF, T2DM, PVD presents to ED for hyperkalemia on outpt labs and AURORA all after starting Bactrim for minor cellulitis 8 days ago.  She is not having any symptoms, overall feels well.  She is on home O2 and says her breathing is at baseline but is always SOB especially with exertion.  She has never taken Bactrim before and says that she has not had CKD history.  No dysuria, still making normal amounts of urine, no chest pain or palpitations. She says she has not been using her bipap 2/2 discomfort.     In ED:  T 98.6 HR 79 /52 RR 17 O2 100% on 3L NC (16 Mar 2019 15:48)      PAST MEDICAL & SURGICAL HISTORY:  Hyperlipemia  Chronic sinusitis  Raynaud phenomenon  HTN (hypertension)  DM (diabetes mellitus)  Claustrophobia  COPD (chronic obstructive pulmonary disease)  S/P tonsillectomy          PREVIOUS DIAGNOSTIC TESTING:    [ ] Echocardiogram:  [ ]  Catheterization:  [ ] Stress Test:  	    MEDICATIONS:  MEDICATIONS  (STANDING):  apixaban 5 milliGRAM(s) Oral every 12 hours  aspirin  chewable 81 milliGRAM(s) Oral daily  atorvastatin 40 milliGRAM(s) Oral at bedtime  buDESOnide  80 MICROgram(s)/formoterol 4.5 MICROgram(s) Inhaler 2 Puff(s) Inhalation two times a day  diltiazem    milliGRAM(s) Oral daily  insulin lispro (HumaLOG) corrective regimen sliding scale   SubCutaneous Before meals and at bedtime  montelukast 10 milliGRAM(s) Oral daily  pantoprazole    Tablet 40 milliGRAM(s) Oral before breakfast  sodium chloride 0.9%. 1000 milliLiter(s) (80 mL/Hr) IV Continuous <Continuous>  theophylline ER (24 Hour) 400 milliGRAM(s) Oral daily      FAMILY HISTORY:  FH: MI (myocardial infarction)  FH: CABG (coronary artery bypass surgery)      SOCIAL HISTORY:    [ ] Non-smoker  [ ] Smoker  [ ] Alcohol    Allergies    No Known Allergies    Intolerances    albuterol (Unknown)  	    REVIEW OF SYSTEMS:  CONSTITUTIONAL: No fever, weight loss, or fatigue  EYES: No eye pain, visual disturbances, or discharge  ENMT:  No difficulty hearing, tinnitus, vertigo; No sinus or throat pain  NECK: No pain or stiffness  RESPIRATORY: No cough, wheezing, chills or hemoptysis; No Shortness of Breath  CARDIOVASCULAR: No chest pain, palpitations, passing out, dizziness, or leg swelling  GASTROINTESTINAL: No abdominal or epigastric pain. No nausea, vomiting, or hematemesis; No diarrhea or constipation. No melena or hematochezia.  GENITOURINARY: No dysuria, frequency, hematuria, or incontinence  NEUROLOGICAL: No headaches, memory loss, loss of strength, numbness, or tremors  SKIN: No itching, burning, rashes, or lesions   	    [ ] All others negative	  [ ] Unable to obtain    PHYSICAL EXAM:  T(C): 36.5 (03-17-19 @ 08:19), Max: 36.9 (03-16-19 @ 12:58)  HR: 75 (03-17-19 @ 08:19) (75 - 86)  BP: 103/83 (03-17-19 @ 08:19) (103/83 - 122/54)  RR: 18 (03-17-19 @ 08:19) (18 - 21)  SpO2: 100% (03-17-19 @ 08:19) (95% - 100%)  Wt(kg): --  I&O's Summary      Appearance: Normal	  Psychiatry: A & O x 3, Mood & affect appropriate  HEENT:   Normal oral mucosa, PERRL, EOMI	  Lymphatic: No lymphadenopathy  Cardiovascular: Normal S1 S2,RRR, No JVD, No murmurs  Respiratory: Lungs clear to auscultation	  Gastrointestinal:  Soft, Non-tender, + BS	  Skin: No rashes, No ecchymoses, No cyanosis	  Neurologic: Non-focal  Extremities: Normal range of motion, No clubbing, cyanosis or edema  Vascular: Peripheral pulses palpable 2+ bilaterally    TELEMETRY: 	    ECG:  	  RADIOLOGY:  OTHER: 	  	  LABS:	 	    CARDIAC MARKERS:                                  11.2   8.2   )-----------( 315      ( 15 Mar 2019 23:44 )             34.5     03-17    137  |  92<L>  |  36<H>  ----------------------------<  86  4.8   |  33<H>  |  1.97<H>    Ca    10.0      17 Mar 2019 06:28  Mg     2.2     03-17    TPro  7.5  /  Alb  4.6  /  TBili  0.1<L>  /  DBili  x   /  AST  22  /  ALT  19  /  AlkPhos  58  03-15      proBNP:   Lipid Profile:   HgA1c:   TSH:     ASSESSMENT/PLAN:

## 2019-03-17 NOTE — PROGRESS NOTE ADULT - ASSESSMENT
59 yoF, F PMH COPD, chronic hypoxic resp failure on home O2 3L, HTN, HLD, CAD s/p 6 stents, dCHF, T2DM, PVD presents to ED for hyperkalemia. 59 yoF, F PMH COPD, chronic hypoxic resp failure on home O2 3L, HTN, HLD, CAD s/p 6 stents, dCHF, T2DM, PVD admitted to the hospital for hyperkalemia.

## 2019-03-17 NOTE — ED ADULT NURSE REASSESSMENT NOTE - NS ED NURSE REASSESS COMMENT FT1
Pt requesting Bipap machine; Admitting team contacted and states they will be putting the order in. Pt also requesting Atrovent nebulizer at this time; admitting team aware and states they will order a 1 time does at this time. Pt breathing spontaneously and unlabored. SpO2 of 99% on 3L NC. Pt watching TV in bed in no apparent distress. safety measures maintained.

## 2019-03-18 ENCOUNTER — TRANSCRIPTION ENCOUNTER (OUTPATIENT)
Age: 59
End: 2019-03-18

## 2019-03-18 VITALS — OXYGEN SATURATION: 97 % | HEART RATE: 71 BPM

## 2019-03-18 DIAGNOSIS — E11.9 TYPE 2 DIABETES MELLITUS WITHOUT COMPLICATIONS: ICD-10-CM

## 2019-03-18 DIAGNOSIS — J96.21 ACUTE AND CHRONIC RESPIRATORY FAILURE WITH HYPOXIA: ICD-10-CM

## 2019-03-18 DIAGNOSIS — J44.9 CHRONIC OBSTRUCTIVE PULMONARY DISEASE, UNSPECIFIED: ICD-10-CM

## 2019-03-18 DIAGNOSIS — I25.10 ATHEROSCLEROTIC HEART DISEASE OF NATIVE CORONARY ARTERY WITHOUT ANGINA PECTORIS: ICD-10-CM

## 2019-03-18 LAB
ANION GAP SERPL CALC-SCNC: 10 MMOL/L — SIGNIFICANT CHANGE UP (ref 5–17)
BUN SERPL-MCNC: 39 MG/DL — HIGH (ref 7–23)
CALCIUM SERPL-MCNC: 9.8 MG/DL — SIGNIFICANT CHANGE UP (ref 8.4–10.5)
CHLORIDE SERPL-SCNC: 98 MMOL/L — SIGNIFICANT CHANGE UP (ref 96–108)
CO2 SERPL-SCNC: 30 MMOL/L — SIGNIFICANT CHANGE UP (ref 22–31)
CREAT SERPL-MCNC: 1.82 MG/DL — HIGH (ref 0.5–1.3)
GLUCOSE SERPL-MCNC: 129 MG/DL — HIGH (ref 70–99)
HCT VFR BLD CALC: 32.5 % — LOW (ref 34.5–45)
HGB BLD-MCNC: 9.7 G/DL — LOW (ref 11.5–15.5)
MCHC RBC-ENTMCNC: 27.4 PG — SIGNIFICANT CHANGE UP (ref 27–34)
MCHC RBC-ENTMCNC: 29.8 GM/DL — LOW (ref 32–36)
MCV RBC AUTO: 91.8 FL — SIGNIFICANT CHANGE UP (ref 80–100)
PLATELET # BLD AUTO: 252 K/UL — SIGNIFICANT CHANGE UP (ref 150–400)
POTASSIUM SERPL-MCNC: 4.7 MMOL/L — SIGNIFICANT CHANGE UP (ref 3.5–5.3)
POTASSIUM SERPL-SCNC: 4.7 MMOL/L — SIGNIFICANT CHANGE UP (ref 3.5–5.3)
RBC # BLD: 3.54 M/UL — LOW (ref 3.8–5.2)
RBC # FLD: 13.9 % — SIGNIFICANT CHANGE UP (ref 10.3–14.5)
SODIUM SERPL-SCNC: 138 MMOL/L — SIGNIFICANT CHANGE UP (ref 135–145)
WBC # BLD: 5.51 K/UL — SIGNIFICANT CHANGE UP (ref 3.8–10.5)
WBC # FLD AUTO: 5.51 K/UL — SIGNIFICANT CHANGE UP (ref 3.8–10.5)

## 2019-03-18 PROCEDURE — 82330 ASSAY OF CALCIUM: CPT

## 2019-03-18 PROCEDURE — 94640 AIRWAY INHALATION TREATMENT: CPT

## 2019-03-18 PROCEDURE — 80048 BASIC METABOLIC PNL TOTAL CA: CPT

## 2019-03-18 PROCEDURE — 96374 THER/PROPH/DIAG INJ IV PUSH: CPT

## 2019-03-18 PROCEDURE — 82962 GLUCOSE BLOOD TEST: CPT

## 2019-03-18 PROCEDURE — 83735 ASSAY OF MAGNESIUM: CPT

## 2019-03-18 PROCEDURE — 82435 ASSAY OF BLOOD CHLORIDE: CPT

## 2019-03-18 PROCEDURE — 84295 ASSAY OF SERUM SODIUM: CPT

## 2019-03-18 PROCEDURE — 83036 HEMOGLOBIN GLYCOSYLATED A1C: CPT

## 2019-03-18 PROCEDURE — 93005 ELECTROCARDIOGRAM TRACING: CPT

## 2019-03-18 PROCEDURE — 85014 HEMATOCRIT: CPT

## 2019-03-18 PROCEDURE — 81003 URINALYSIS AUTO W/O SCOPE: CPT

## 2019-03-18 PROCEDURE — 99232 SBSQ HOSP IP/OBS MODERATE 35: CPT | Mod: GC

## 2019-03-18 PROCEDURE — 82565 ASSAY OF CREATININE: CPT

## 2019-03-18 PROCEDURE — 83605 ASSAY OF LACTIC ACID: CPT

## 2019-03-18 PROCEDURE — 96375 TX/PRO/DX INJ NEW DRUG ADDON: CPT

## 2019-03-18 PROCEDURE — 84132 ASSAY OF SERUM POTASSIUM: CPT

## 2019-03-18 PROCEDURE — 94660 CPAP INITIATION&MGMT: CPT

## 2019-03-18 PROCEDURE — 82803 BLOOD GASES ANY COMBINATION: CPT

## 2019-03-18 PROCEDURE — 99239 HOSP IP/OBS DSCHRG MGMT >30: CPT

## 2019-03-18 PROCEDURE — 85027 COMPLETE CBC AUTOMATED: CPT

## 2019-03-18 PROCEDURE — 80053 COMPREHEN METABOLIC PANEL: CPT

## 2019-03-18 PROCEDURE — 82947 ASSAY GLUCOSE BLOOD QUANT: CPT

## 2019-03-18 PROCEDURE — 99284 EMERGENCY DEPT VISIT MOD MDM: CPT | Mod: 25

## 2019-03-18 PROCEDURE — 86803 HEPATITIS C AB TEST: CPT

## 2019-03-18 RX ORDER — METFORMIN HYDROCHLORIDE 850 MG/1
1 TABLET ORAL
Qty: 0 | Refills: 0 | COMMUNITY

## 2019-03-18 RX ORDER — DILTIAZEM HCL 120 MG
1 CAPSULE, EXT RELEASE 24 HR ORAL
Qty: 30 | Refills: 0
Start: 2019-03-18 | End: 2019-04-16

## 2019-03-18 RX ORDER — FUROSEMIDE 40 MG
1 TABLET ORAL
Qty: 0 | Refills: 0 | COMMUNITY

## 2019-03-18 RX ORDER — BUDESONIDE AND FORMOTEROL FUMARATE DIHYDRATE 160; 4.5 UG/1; UG/1
2 AEROSOL RESPIRATORY (INHALATION)
Qty: 0 | Refills: 0 | COMMUNITY

## 2019-03-18 RX ORDER — DILTIAZEM HCL 120 MG
1 CAPSULE, EXT RELEASE 24 HR ORAL
Qty: 0 | Refills: 0 | COMMUNITY

## 2019-03-18 RX ORDER — DILTIAZEM HCL 120 MG
180 CAPSULE, EXT RELEASE 24 HR ORAL DAILY
Qty: 0 | Refills: 0 | Status: DISCONTINUED | OUTPATIENT
Start: 2019-03-19 | End: 2019-03-18

## 2019-03-18 RX ORDER — OLMESARTAN MEDOXOMIL 5 MG/1
1 TABLET, FILM COATED ORAL
Qty: 0 | Refills: 0 | COMMUNITY

## 2019-03-18 RX ORDER — IPRATROPIUM BROMIDE 0.2 MG/ML
500 SOLUTION, NON-ORAL INHALATION ONCE
Qty: 0 | Refills: 0 | Status: COMPLETED | OUTPATIENT
Start: 2019-03-18 | End: 2019-03-18

## 2019-03-18 RX ORDER — IPRATROPIUM BROMIDE 0.2 MG/ML
2.5 SOLUTION, NON-ORAL INHALATION
Qty: 0 | Refills: 0 | COMMUNITY

## 2019-03-18 RX ADMIN — Medication 500 MICROGRAM(S): at 06:35

## 2019-03-18 RX ADMIN — Medication 81 MILLIGRAM(S): at 11:21

## 2019-03-18 RX ADMIN — BUDESONIDE AND FORMOTEROL FUMARATE DIHYDRATE 2 PUFF(S): 160; 4.5 AEROSOL RESPIRATORY (INHALATION) at 06:36

## 2019-03-18 RX ADMIN — Medication 240 MILLIGRAM(S): at 06:12

## 2019-03-18 RX ADMIN — PANTOPRAZOLE SODIUM 40 MILLIGRAM(S): 20 TABLET, DELAYED RELEASE ORAL at 06:13

## 2019-03-18 RX ADMIN — APIXABAN 5 MILLIGRAM(S): 2.5 TABLET, FILM COATED ORAL at 06:12

## 2019-03-18 RX ADMIN — Medication 400 MILLIGRAM(S): at 11:21

## 2019-03-18 NOTE — PROGRESS NOTE ADULT - SUBJECTIVE AND OBJECTIVE BOX
CARDIOLOGY FOLLOW UP - Dr. Brunson    CC no new complaints  "i want to be discharged"       PHYSICAL EXAM:  T(C): 36.5 (03-18-19 @ 04:20), Max: 36.8 (03-17-19 @ 20:37)  HR: 76 (03-18-19 @ 05:03) (76 - 80)  BP: 99/67 (03-18-19 @ 04:20) (99/67 - 110/67)  RR: 18 (03-18-19 @ 04:20) (18 - 19)  SpO2: 95% (03-18-19 @ 05:03) (93% - 100%)  Wt(kg): --  I&O's Summary    17 Mar 2019 07:01  -  18 Mar 2019 07:00  --------------------------------------------------------  IN: 550 mL / OUT: 200 mL / NET: 350 mL        Appearance: Normal	  Cardiovascular: Normal S1 S2,RRR, No JVD, No murmurs  Respiratory: Lungs clear to auscultation	  Gastrointestinal:  Soft, Non-tender, + BS	  Extremities: Normal range of motion, No clubbing, cyanosis or edema      MEDICATIONS  (STANDING):  apixaban 5 milliGRAM(s) Oral every 12 hours  aspirin  chewable 81 milliGRAM(s) Oral daily  atorvastatin 40 milliGRAM(s) Oral at bedtime  buDESOnide  80 MICROgram(s)/formoterol 4.5 MICROgram(s) Inhaler 2 Puff(s) Inhalation two times a day  insulin lispro (HumaLOG) corrective regimen sliding scale   SubCutaneous Before meals and at bedtime  montelukast 10 milliGRAM(s) Oral daily  pantoprazole    Tablet 40 milliGRAM(s) Oral before breakfast  sodium chloride 0.9%. 1000 milliLiter(s) (90 mL/Hr) IV Continuous <Continuous>  sodium chloride 0.9%. 1000 milliLiter(s) (80 mL/Hr) IV Continuous <Continuous>  theophylline ER (24 Hour) 400 milliGRAM(s) Oral daily      TELEMETRY: 	    ECG:  	  RADIOLOGY:   DIAGNOSTIC TESTING:  [ ] Echocardiogram:  [ ]  Catheterization:  [ ] Stress Test:    OTHER: 	    LABS:	 	                                9.7    5.51  )-----------( 252      ( 18 Mar 2019 08:28 )             32.5     03-18    138  |  98  |  39<H>  ----------------------------<  129<H>  4.7   |  30  |  1.82<H>    Ca    9.8      18 Mar 2019 06:49  Mg     2.2     03-17

## 2019-03-18 NOTE — PROGRESS NOTE ADULT - ATTENDING COMMENTS
agree with above  cv stable  decrease cardizem dose  dc home
Nephrology consult is called and awaiting for response, Nephrology team has recommended to continue IVF and will see patient later with full recommendation.   I have spoken with patient at length in AM and she agreed about continuing to have IVF and awaiting on the Nephrology evaluation.        Inez Garcia   [facility]   
d/c plan home later today. patient reports her brother will bring her home O2. d/c time 40 mins.

## 2019-03-18 NOTE — PROGRESS NOTE ADULT - SUBJECTIVE AND OBJECTIVE BOX
[facility] DIVISION OF KIDNEY DISEASES AND HYPERTENSION -- FOLLOW UP NOTE  --------------------------------------------------------------------------------  Chief Complaint: AURORA    24 hour events/subjective:  Scr trending down. Pt has no acute complaint.        PAST HISTORY  --------------------------------------------------------------------------------  No significant changes to PMH, PSH, FHx, SHx, unless otherwise noted    ALLERGIES & MEDICATIONS  --------------------------------------------------------------------------------  Allergies    No Known Allergies    Intolerances    albuterol (Unknown)    Standing Inpatient Medications  apixaban 5 milliGRAM(s) Oral every 12 hours  aspirin  chewable 81 milliGRAM(s) Oral daily  atorvastatin 40 milliGRAM(s) Oral at bedtime  buDESOnide  80 MICROgram(s)/formoterol 4.5 MICROgram(s) Inhaler 2 Puff(s) Inhalation two times a day  insulin lispro (HumaLOG) corrective regimen sliding scale   SubCutaneous Before meals and at bedtime  montelukast 10 milliGRAM(s) Oral daily  pantoprazole    Tablet 40 milliGRAM(s) Oral before breakfast  sodium chloride 0.9%. 1000 milliLiter(s) IV Continuous <Continuous>  sodium chloride 0.9%. 1000 milliLiter(s) IV Continuous <Continuous>  theophylline ER (24 Hour) 400 milliGRAM(s) Oral daily    PRN Inpatient Medications      REVIEW OF SYSTEMS  --------------------------------------------------------------------------------  Gen: No weight changes, fatigue, fevers/chills  Skin: No rashes  Head/Eyes/Ears/Mouth: No headache; Normal hearing; Normal vision w/o blurriness  Respiratory: No dyspnea, cough, wheezing, hemoptysis  CV: No chest pain, PND, orthopnea  GI: No abdominal pain, diarrhea, constipation, nausea, vomiting  : No increased frequency, dysuria, hematuria, nocturia  MSK: No joint pain/swelling; no back pain; no edema  Neuro: No dizziness/lightheadedness, weakness, seizure  Heme: No easy bruising or bleeding    VITALS/PHYSICAL EXAM  --------------------------------------------------------------------------------  T(C): 36.5 (03-18-19 @ 04:20), Max: 36.8 (03-17-19 @ 20:37)  HR: 71 (03-18-19 @ 11:58) (71 - 80)  BP: 99/67 (03-18-19 @ 04:20) (99/67 - 110/67)  RR: 18 (03-18-19 @ 04:20) (18 - 19)  SpO2: 97% (03-18-19 @ 11:58) (93% - 100%)  Wt(kg): --  Height (cm): 162.56 (03-17-19 @ 20:37)  Weight (kg): 86.6 (03-17-19 @ 20:37)  BMI (kg/m2): 32.8 (03-17-19 @ 20:37)  BSA (m2): 1.92 (03-17-19 @ 20:37)      03-17-19 @ 07:01  -  03-18-19 @ 07:00  --------------------------------------------------------  IN: 550 mL / OUT: 200 mL / NET: 350 mL      Physical Exam:  	Gen: NAD, well-appearing  	HEENT: PERRL, supple neck  	Pulm: CTA B/L  	CV: RRR, S1S2; no rub  	Back: No spinal or CVA tenderness  	Abd: +BS, soft, nontender/nondistended  	: No suprapubic tenderness  	UE: Warm,  no edema  	LE: Warm,  no edema  	Neuro: No focal deficits, intact gait  	Psych: Normal affect and mood  	Skin: Warm, without rashes  	    LABS/STUDIES  --------------------------------------------------------------------------------              9.7    5.51  >-----------<  252      [03-18-19 @ 08:28]              32.5     138  |  98  |  39  ----------------------------<  129      [03-18-19 @ 06:49]  4.7   |  30  |  1.82        Ca     9.8     [03-18-19 @ 06:49]      Mg     2.2     [03-17-19 @ 06:28]            Creatinine Trend:  SCr 1.82 [03-18 @ 06:49]  SCr 1.97 [03-17 @ 06:28]  SCr 1.97 [03-16 @ 14:53]  SCr 2.18 [03-15 @ 23:44]    Urinalysis - [03-16-19 @ 01:45]      Color Colorless / Appearance Clear / SG 1.009 / pH 6.5      Gluc Negative / Ketone Negative  / Bili Negative / Urobili Negative       Blood Negative / Protein Negative / Leuk Est Negative / Nitrite Negative      RBC  / WBC  / Hyaline  / Gran  / Sq Epi  / Non Sq Epi  / Bacteria       HbA1c 5.4      [03-17-19 @ 08:45]    HCV 0.18, Nonreact      [03-17-19 @ 08:49]

## 2019-03-18 NOTE — PHARMACOTHERAPY INTERVENTION NOTE - COMMENTS
Medication reconciliation completed. Please refer to outpatient medication review for the updated list.    Jose Hackett, PharmD  [phone]

## 2019-03-18 NOTE — CHART NOTE - NSCHARTNOTEFT_GEN_A_CORE
Request from Dr. Beck to facilitate patient discharge.  Discussed current lab work with Dr. Beck.  Medication reconciliation reviewed, revised, and resolved with Dr. Beck, who has medically cleared patient for discharge with follow up as advised.  Please refer to discharge note for detailed hospital course.

## 2019-03-18 NOTE — PROGRESS NOTE ADULT - ASSESSMENT
Pt. with AURORA.    1)AURORA (acute kidney injury): due to poor po intake+ Benicar use superimposed by Bactrim use for cellulitis. Upon review of [facility] SANT[facility], patient with normal Scr in the past. Pt, however with elevated Scr of 2.1 on presentation, trending down to 1.8 today. Pt is off Bactrim now.  Monitor UOP. Avoid nephrotoxins.    2) HTN (hypertension): Pt. with history of hypertension treated with Benicar and Lasix.Bp soft. Continue to hold Benicar and Lasix. Monitor BP.  Follow up with PCP later this week to repeat BMP and monitor BP. Pt. with AURORA.    1)AURORA (acute kidney injury): due to poor po intake+ Benicar use superimposed by Bactrim use for cellulitis. Upon review of [facility], patient with normal Scr in the past. Pt, however with elevated Scr of 2.1 on presentation, trending down to 1.8 today. Pt is off Bactrim/ARB now.  Monitor UOP. Avoid nephrotoxins.    2) HTN (hypertension): Pt. with history of hypertension treated with Benicar and Lasix.Bp soft. Continue to hold Benicar and Lasix. Monitor BP.  Follow up with PCP later this week to repeat BMP and monitor BP.   Can f/u with Dr Aleida Leigh as an outpatient. Contact info provided to her

## 2019-03-18 NOTE — DISCHARGE NOTE NURSING/CASE MANAGEMENT/SOCIAL WORK - NSDCDPATPORTLINK_GEN_ALL_CORE
You can access the [facility] Portal, offered by [facility], by registering with the following website: [email or web address]

## 2019-03-18 NOTE — PROGRESS NOTE ADULT - NSICDXPROBLEM_GEN_ALL_CORE_FT
PROBLEM DIAGNOSES  Problem: AURORA (acute kidney injury)  Assessment and Plan: PROBLEM DIAGNOSES  Problem: AURORA (acute kidney injury)  Assessment and Plan: appreciate renal input  serum cr continues to trend down  continue to hold ACEI, lasix. Patient no longer on bactrim    Problem: Hyperkalemia  Assessment and Plan: resolved with IVF    Problem: Type 2 diabetes mellitus  Assessment and Plan: HbA1c 5.4  Advised patient to discontinue metformin on discharge.    Problem: HTN (hypertension)  Assessment and Plan: SBP marginal  Continue to hold benicar and lasix.  D/W cards (Dr. Brunson)--> will decrease cardizem from 240mg to 180mg QD    Problem: COPD without exacerbation  Assessment and Plan: Continue with home O2 and nocturnal BIPAP    Problem: CAD (coronary artery disease)  Assessment and Plan: continue aspirin, statin    Problem: Afib  Assessment and Plan: documented per chart  continue eliquis and cardizem PROBLEM DIAGNOSES  Problem: AURORA (acute kidney injury)  Assessment and Plan: appreciate renal input  serum cr continues to trend down  continue to hold ACEI, lasix. Patient no longer on bactrim    Problem: Hyperkalemia  Assessment and Plan: resolved with IVF    Problem: Type 2 diabetes mellitus  Assessment and Plan: HbA1c 5.4  Advised patient to discontinue metformin on discharge.    Problem: HTN (hypertension)  Assessment and Plan: SBP marginal  Continue to hold benicar and lasix.  D/W cards (Dr. Brunson)--> will decrease cardizem from 240mg to 180mg QD    Problem: COPD without exacerbation  Assessment and Plan: Continue with home O2 and nocturnal BIPAP  Continue home meds    Problem: CAD (coronary artery disease)  Assessment and Plan: continue aspirin, statin    Problem: Afib  Assessment and Plan: documented per chart but patient unsure and not sure why she is on eliquis  continue eliquis and cardizem

## 2019-03-18 NOTE — PROGRESS NOTE ADULT - ASSESSMENT
59 yoF, F PMH COPD, chronic hypoxic resp failure on home O2 3L, HTN, HLD, CAD s/p 6 stents, dCHF, T2DM, PVD presents to ED for hyperkalemia and AURORA    -CV status is stable  - no evidence of fluid overload no exam   -serum cr continues to trend down  -continue to hold ACEI, lasix.   -K improved   -cont CCB  -continue Eliquis

## 2019-03-18 NOTE — DISCHARGE NOTE NURSING/CASE MANAGEMENT/SOCIAL WORK - NSDCFUADDAPPT_GEN_ALL_CORE_FT
You will need to follow up with your primary medical doctor/pulmonologist, Dr. Mathew, within one week of discharge - please calll to make an appointment.  At this time, you will need a basic metabolic panel drawn to check your potassium and creatinine levels.  At this time, you will discuss your current medications to see if any changes need to be made (like restarting lasix).    You will need to follow up with your cardiologist, Dr. Chapa, within one week of discharge - please call to make an appointment.  At this time, you will discuss your blood pressure/current medication regimen.    You will need to follow up with a nephrologist within one week of discharge - if you do not have one, you can follow up with Dr. Leigh [phone].  Please call to make an appointment.  At this appointment, you will discuss your current creatinine/potassium levels and medications.

## 2019-03-18 NOTE — DISCHARGE NOTE NURSING/CASE MANAGEMENT/SOCIAL WORK - NSDCPEWEB_GEN_ALL_CORE
[facility] for Tobacco Control website --- [email or web address] --- [email or web address]

## 2019-03-18 NOTE — PROGRESS NOTE ADULT - ASSESSMENT
59 F PMH COPD, chronic hypoxic resp failure on home O2 (3L), HTN, HLD, CAD s/p 6 stents, chronic diastolic CHF, T2DM (HbA1c 5.4), PVD admitted to the hospital for hyperkalemia and AURORA. 59 F PMH COPD, chronic hypoxic resp failure on home O2 (3L), HTN, HLD, CAD s/p 6 stents, chronic diastolic CHF, afib(per chart), T2DM (HbA1c 5.4), PVD admitted to the hospital for hyperkalemia and AURORA. 59 F PMH COPD, chronic hypoxic resp failure on home O2 (3L), HTN, HLD, CAD s/p 6 stents, chronic diastolic CHF, afib(per chart, patient unsure), T2DM (HbA1c 5.4), PVD admitted to the hospital for hyperkalemia and AURORA.

## 2019-03-19 NOTE — DISCUSSION/SUMMARY
[Home] : patient was discharged to home [FreeTextEntry1] : Spoke with pt regarding DC from [facility] on 3/18/19 after being admitted on 3/16/19 for hyperkalemia. Pt denies pain, fever, dizziness, N/V/D. Pt reports she was instructed to discontinue Lasix and Benicar and 3 new medications were added that she wishes to review at an HFU appointment. Pt wishes to speak with her PCP, Dr. Mathew regarding scheduling an HFU appt. Pt instructed to call office with any questions or concerns. Pt verbalized an understanding and denies having any questions at this time.

## 2019-03-20 ENCOUNTER — APPOINTMENT (OUTPATIENT)
Dept: INTERNAL MEDICINE | Facility: CLINIC | Age: 59
End: 2019-03-20

## 2019-03-21 ENCOUNTER — APPOINTMENT (OUTPATIENT)
Dept: INTERNAL MEDICINE | Facility: CLINIC | Age: 59
End: 2019-03-21
Payer: COMMERCIAL

## 2019-03-21 VITALS — OXYGEN SATURATION: 99 % | HEART RATE: 86 BPM

## 2019-03-21 VITALS
SYSTOLIC BLOOD PRESSURE: 130 MMHG | HEART RATE: 97 BPM | TEMPERATURE: 97.9 F | BODY MASS INDEX: 32.94 KG/M2 | OXYGEN SATURATION: 89 % | DIASTOLIC BLOOD PRESSURE: 60 MMHG | WEIGHT: 191.9 LBS

## 2019-03-21 DIAGNOSIS — E87.5 HYPERKALEMIA: ICD-10-CM

## 2019-03-21 PROCEDURE — 36415 COLL VENOUS BLD VENIPUNCTURE: CPT

## 2019-03-21 PROCEDURE — 99496 TRANSJ CARE MGMT HIGH F2F 7D: CPT | Mod: 25

## 2019-03-21 RX ORDER — ISOSORBIDE MONONITRATE 30 MG/1
30 TABLET, EXTENDED RELEASE ORAL
Qty: 90 | Refills: 1 | Status: DISCONTINUED | COMMUNITY
Start: 2019-02-13 | End: 2019-03-21

## 2019-03-21 RX ORDER — FUROSEMIDE 40 MG/1
40 TABLET ORAL
Qty: 90 | Refills: 1 | Status: DISCONTINUED | COMMUNITY
Start: 2019-02-13 | End: 2019-03-21

## 2019-03-21 RX ORDER — ASPIRIN 81 MG
81 TABLET, DELAYED RELEASE (ENTERIC COATED) ORAL
Refills: 0 | Status: ACTIVE | COMMUNITY

## 2019-03-21 RX ORDER — DILTIAZEM HYDROCHLORIDE 240 MG/1
240 CAPSULE, EXTENDED RELEASE ORAL
Qty: 90 | Refills: 1 | Status: DISCONTINUED | COMMUNITY
Start: 2019-02-13 | End: 2019-03-21

## 2019-03-21 RX ORDER — METFORMIN HYDROCHLORIDE 500 MG/1
500 TABLET, COATED ORAL DAILY
Refills: 0 | Status: DISCONTINUED | COMMUNITY
Start: 2019-02-13 | End: 2019-03-21

## 2019-03-21 RX ORDER — AZITHROMYCIN 250 MG/1
250 TABLET, FILM COATED ORAL
Refills: 0 | Status: DISCONTINUED | COMMUNITY
Start: 2019-02-13 | End: 2019-03-21

## 2019-03-21 RX ORDER — DOCUSATE SODIUM 100 MG/1
100 CAPSULE ORAL 3 TIMES DAILY
Qty: 60 | Refills: 2 | Status: DISCONTINUED | COMMUNITY
Start: 2019-02-13 | End: 2019-03-21

## 2019-03-21 RX ORDER — SULFAMETHOXAZOLE AND TRIMETHOPRIM 800; 160 MG/1; MG/1
800-160 TABLET ORAL TWICE DAILY
Qty: 20 | Refills: 0 | Status: DISCONTINUED | COMMUNITY
Start: 2019-03-06 | End: 2019-03-21

## 2019-03-21 RX ORDER — OLMESARTAN MEDOXOMIL 20 MG/1
20 TABLET, FILM COATED ORAL
Refills: 0 | Status: DISCONTINUED | COMMUNITY
Start: 2019-02-13 | End: 2019-03-21

## 2019-03-22 LAB
ALBUMIN SERPL ELPH-MCNC: 4.2 G/DL
ALP BLD-CCNC: 56 U/L
ALT SERPL-CCNC: 12 U/L
ANION GAP SERPL CALC-SCNC: 12 MMOL/L
AST SERPL-CCNC: 16 U/L
BASOPHILS # BLD AUTO: 0.05 K/UL
BASOPHILS NFR BLD AUTO: 0.6 %
BILIRUB SERPL-MCNC: 0.2 MG/DL
BUN SERPL-MCNC: 26 MG/DL
CALCIUM SERPL-MCNC: 10.1 MG/DL
CHLORIDE SERPL-SCNC: 99 MMOL/L
CO2 SERPL-SCNC: 32 MMOL/L
CREAT SERPL-MCNC: 1.17 MG/DL
EOSINOPHIL # BLD AUTO: 0.18 K/UL
EOSINOPHIL NFR BLD AUTO: 2.3 %
GLUCOSE SERPL-MCNC: 86 MG/DL
HCT VFR BLD CALC: 36.2 %
HGB BLD-MCNC: 10.7 G/DL
IMM GRANULOCYTES NFR BLD AUTO: 0.3 %
LYMPHOCYTES # BLD AUTO: 1.92 K/UL
LYMPHOCYTES NFR BLD AUTO: 24.5 %
MAN DIFF?: NORMAL
MCHC RBC-ENTMCNC: 27.8 PG
MCHC RBC-ENTMCNC: 29.6 GM/DL
MCV RBC AUTO: 94 FL
MONOCYTES # BLD AUTO: 0.52 K/UL
MONOCYTES NFR BLD AUTO: 6.6 %
NEUTROPHILS # BLD AUTO: 5.15 K/UL
NEUTROPHILS NFR BLD AUTO: 65.7 %
PLATELET # BLD AUTO: 307 K/UL
POTASSIUM SERPL-SCNC: 5.3 MMOL/L
PROT SERPL-MCNC: 6.8 G/DL
RBC # BLD: 3.85 M/UL
RBC # FLD: 14.2 %
SODIUM SERPL-SCNC: 142 MMOL/L
WBC # FLD AUTO: 7.84 K/UL

## 2019-03-23 PROBLEM — E87.5 HYPERKALEMIA: Status: ACTIVE | Noted: 2019-03-23

## 2019-03-23 NOTE — ASSESSMENT
[FreeTextEntry1] : AURORA/Hyperkalemia\par Improved with IVF and d/c of Bactrim, Benicar, Lasix\par Will check CR and BMP today\par Low potassium diet\par Must use BIPAP for 6-8 hours daily to drive down PCO2\par Bactrim completed\par Remain off Benicar\par Hold Lasix for now\par \par DM\par Has been in good control\par Last HGBA1C = 5.4%\par Off Metformin\par PT\par Weight control\par ADA diet\par Podiatry and Ophtho f/u\par Monitor glucose, HGBA1C\par Monitor FSG's\par Continue low fat diet and daily statin\par \par HTN\par BP in good range today\par Low salt diet\par Weight control\par Remains on Cardizem 180 mg daily\par Cardiology f/u with Dr. Chapa\par \par COPD\par End-stage\par Awaiting possible lung transplant\par Has URI\par Continue oxygen 3 LPM / 4 LPM with exercise\par BIPAP 6-8 hours per day\par NM\par Will monitor CT/PFT's\par Had flu vaccine\par No smoking\par Continue Symbicort, Spiriva, Singulair, Charan\par Nebs qid\par Prednisone 20 mg daily for 5 days\par Zithromax daily\par Mucinex DM bid\par \par AF\par On Cardizem\par On Eliquis\par Cardiology f/u for ?Holter given occasional palpitations\par \par Venous stasis ulcer - clinically improved\par Had negative venous dopplers\par Awaiting arterial dopplers\par Seeing Vascular\par LE elevation\par Low sodium diet\par Support hose\par CBC sent\par \par RTC 1-2 weeks and as needed\par To call for any medical issues

## 2019-03-23 NOTE — HISTORY OF PRESENT ILLNESS
[Post-hospitalization from ___ Hospital] : Post-hospitalization from [unfilled] Hospital [Admitted on: ___] : The patient was admitted on [unfilled] [Discharged on ___] : discharged on [unfilled] [Discharge Summary] : discharge summary [Pertinent Labs] : pertinent labs [Radiology Findings] : radiology findings [Discharge Med List] : discharge medication list [Med Reconciliation] : medication reconciliation has been completed [Patient Contacted By: ____] : and contacted by [unfilled] [FreeTextEntry2] : \par Patient GUY HARTMANision MRN [id number] Hospital Visit [id number] [facility] - Attending Physician hSaron Beck \par Status Complete \par  \par \par Hospital Course: \par Discharge Date	18-Mar-2019 \par Admission Date	16-Mar-2019 02:26 \par Reason for Admission	Hyperkalemia \par Medication Reconciliation Status	Admission Reconciliation is Completed \par Discharge Reconciliation is Completed \par Hospital Course	 \par 59 F PMH COPD, chronic hypoxic respiratory failure on home O2 (3L), HTN, HLD, \par CAD s/p 6 stents, chronic diastolic CHF, afib (per chart, patient unsure), T2DM \par (HbA1c 5.4), PVD, recent course of bactrim as outpatient admitted to the [facility] for hyperkalemia and AURORA. \par \par Problem: AURORA (acute kidney injury) \par - seen by nephrology \par - serum cr improved with IVF \par - Benicar and lasix discontinued \par \par Problem: Hyperkalemia \par - resolved with IVF \par \par Problem: Type 2 diabetes mellitus \par - HbA1c 5.4 \par - Advised patient to discontinue metformin on discharge. \par \par Problem: HTN (hypertension) \par - SBP remained marginal \par - Benicar and lasix discontinued \par - Per discussion with cards (Dr. Brunson)--> decreased cardizem from 240mg to \par 180mg QD \par \par Problem: COPD without exacerbation \par - Continue with home O2 and nocturnal BIPAP \par - Continue home meds \par \par Problem: CAD (coronary artery disease) \par - Continue aspirin, statin \par \par Problem: Afib \par - Documented per chart but patient unsure and not sure why she is on eliquis \par - continue eliquis and cardizem. \par \par Renal function overall improved and patient discharged home with plans to \par follow up as outpatient for repeat blood work this week. \par \par Care Plan/Procedures: \par Goal(s)	To get better and follow your care plan as instructed. \par Discharge Diagnoses, Assessment and Plan of Treatment	PRINCIPAL DISCHARGE \par DIAGNOSIS \par Diagnosis: Hyperkalemia \par Assessment and Plan of Treatment: Resolved. \par Low potassium diet \par You will need to follow up with a nephrologist within one week of discharge. \par You can follow up with Dr. Leigh, [phone], who saw you during your \par hospitalization - please call to make an appointment.  At this time, you will \par discuss your creatinine/potassium levels and medications. \par \par \par SECONDARY DISCHARGE DIAGNOSES \par Diagnosis: Hypotension \par Assessment and Plan of Treatment: Your blood pressure has been low during \par admission. \par Your cardizem is being decreased upon discharge. \par You will need to follow up with your cardiologist, Dr. Chapa, within one week \par of discharge - please call to make an appointment.  At this time, you will \par discuss your current medication regimen and if any changes need to be made. \par \par Diagnosis: Acute kidney injury \par Assessment and Plan of Treatment: You will stop your Benicar and you lasix for \par now. \par You will need to follow up with a nephrologist within one week of discharge - \par please call to make an appointment. \par You will need to follow up with your primary medical doctor, Dr. Mathew, within \par one week of discharge.  At this time, you will have your creatinine/potassium \par levels checked and discuss if any adjustments need to be made to your \par medication regimen, such as possibly restarting your lasix. \par \par Diagnosis: Afib \par Assessment and Plan of Treatment: You will need to follow up with your \par cardiologist, Dr. Chapa, within one week of discharge - please call to make an \par appointment. \par Atrial fibrillation is the most common heart rhythm problem.  The condition \par puts you at risk for has stroke and heart attack \par It helps if you control your blood pressure, not drink more than 1-2 alcohol \par drinks per day, cut down on caffeine, getting treatment for over active thyroid \par gland, and get regular exercise \par Call your doctor if you feel your heart racing or beating unusually, chest \par tightness or pain, lightheaded, faint, shortness of breath especially with \par exercise \par It is important to take your heart medication as prescribed \par You may be on anticoagulation which is very important to take as directed - you \par may need blood work to monitor drug levels \par \par \par Diagnosis: Type 2 diabetes mellitus \par Assessment and Plan of Treatment: You will stop your taking your Metformin. \par You will follow up with Dr. Mathew within one week and discuss your current \par medication regimen. \par HgA1C this admission - is 5.2 \par Make sure you get your HgA1c checked every three months. \par If you take oral diabetes medications, check your blood glucose two times a \par day. \par If you take insulin, check your blood glucose before meals and at bedtime. \par It's important not to skip any meals. \par Keep a log of your blood glucose results and always take it with you to your \par doctor appointments. \par Keep a list of your current medications including injectables and over the \par counter medications and bring this medication list with you to all your doctor \par appointments. \par If you have not seen your ophthalmologist this year call for appointment. \par Check your feet daily for redness, sores, or openings. Do not self treat. If no \par improvement in two days call your primary care physician for an appointment. \par Low blood sugar (hypoglycemia) is a blood sugar below 70mg/dl. Check your blood \par sugar if you feel signs/symptoms of hypoglycemia. If your blood sugar is below \par 70 take 15 grams of carbohydrates (ex 4 oz of apple juice, 3-4 glucose tablets, \par or 4-6 oz of regular soda) wait 15 minutes and repeat blood sugar to make sure \par it comes up above 70.  If your blood sugar is above 70 and you are due for a \par meal, have a meal.  If you are not due for a meal have a snack.  This snack \par helps keeps your blood sugar at a safe range. \par \par \par Diagnosis: COPD without exacerbation \par Assessment and Plan of Treatment: You will need to follow up with your \par pulmonologist, Dr. Mathew, within one week of discharge - please call to make an \par appointment. \par \par Follow Up: \par Care Providers for Follow up (PCP/Outpatient Provider)	Anita Mathew) \par Critical Care Medicine; Internal Medicine; Pulmonary Disease \par [address] \[place] [address] \par Phone: [phone] \par Fax: [phone] \par Follow Up Time: \par \par Dalton Chapa) \par Cardiovascular Disease; Internal Medicine \par [address] [place] [address], [address] \[place] [address] \par Phone: [phone] \par Fax: [phone] \par Follow Up Time: \par \par Aleida Leigh) \par Internal Medicine; Nephrology \par [address] [place] [address] \[place] [address] \par Phone: [phone] \par Fax: [phone] \par Follow Up Time: \par Additional Scheduled Appointments	You will need to follow up with your primary \par medical doctor/pulmonologist, Dr. Mathew, within one week of discharge - please \par calll to make an appointment.  At this time, you will need a basic metabolic \par panel drawn to check your potassium and creatinine levels.  At this time, you \par will discuss your current medications to see if any changes need to be made \par (like restarting lasix). \par \par You will need to follow up with your cardiologist, Dr. Chapa, within one week \par of discharge - please call to make an appointment.  At this time, you will \par discuss your blood pressure/current medication regimen. \par \par You will need to follow up with a nephrologist within one week of discharge - \par if you do not have one, you can follow up with Dr. Leigh [phone]. \par Please call to make an appointment.  At this appointment, you will discuss your \par current creatinine/potassium levels and medications. \par Diet Instructions	Low potassium diet \par \par Quality Measures: \par Does the patient have difficulty running errands alone like visiting a doctors \par office or shopping?	Yes \par Does the patient have difficulty climbing stairs?	Yes \par Patient Condition	Stable \par Hospice Patient	No \par Cognition: The patient has	No difficulties \par Did the patient present with or suffer from an ischemic stroke, hemorrhagic \par stroke or TIA during this admission?	No \par Has the patient had an Acute Myocardial Infarction?	No \par Has the patient had a Percutaneous Coronary Intervention?	No \par \par Document Complete: \par Physician Section Complete	This document is complete and the patient is ready \par for discharge. \par For questions about your prescriptions, please call:	[phone] \par Is this contact telephone number correct?	Yes \par \par \par \par Electronic Signatures: \par Sharon Beck) (Signed 18-Mar-2019 14:18) \par 	Authored: Discharge Note Provider \par 	Co-Signer: Discharge Note Provider \par Flores Chacon (NP) (Signed 18-Mar-2019 11:59) \par 	Authored: Discharge Note Provider \par Inez Garcia) (Signed 19-Mar-2019 13:14) \par 	Co-Signer: Discharge Note Provider \par Yusuf Zarate (NP) (Signed 17-Mar-2019 11:48) \par 	Authored: Discharge Note Provider \par \par Last Updated: 19-Mar-2019 13:14 by Inez Garcia) \par \par Feeling okay.\par LE wound healed with no bruising or erythema.\par Minimal edema off Lasix.\par No calf pain.\par Normal BM and urination.\par No hematuria.\par Denies abdominal pain.\par No n/v.\par Good appetite.\par Denies chest pain.\par Has a cold now.\par Denies hemoptysis.\par Mild cough.\par Mildly increased SOB.\par No fever or chest pain or palpitations.

## 2019-03-23 NOTE — HEALTH RISK ASSESSMENT
[Intercurrent ED visits] : went to ED [Intercurrent hospitalizations] : was admitted to the hospital  [No falls in past year] : Patient reported no falls in the past year [0] : 2) Feeling down, depressed, or hopeless: Not at all (0) [Patient declined PAP Smear] : Patient declined PAP Smear [Patient declined colonoscopy] : Patient declined colonoscopy [HIV test declined] : HIV test declined [Hepatitis C test declined] : Hepatitis C test declined [Behavioral] : behavioral [Alone] : lives alone [# of Members in Household ___] :  household currently consist of [unfilled] member(s) [Employed] : employed [High School] : high school [Single] : single [# Of Children ___] : has [unfilled] children [Feels Safe at Home] : Feels safe at home [Fully functional (bathing, dressing, toileting, transferring, walking, feeding)] : Fully functional (bathing, dressing, toileting, transferring, walking, feeding) [Fully functional (using the telephone, shopping, preparing meals, housekeeping, doing laundry, using] : Fully functional and needs no help or supervision to perform IADLs (using the telephone, shopping, preparing meals, housekeeping, doing laundry, using transportation, managing medications and managing finances) [Reports changes in dental health] : Reports changes in dental health [Smoke Detector] : smoke detector [Carbon Monoxide Detector] : carbon monoxide detector [Seat Belt] :  uses seat belt [Caregiver Concerns] : has caregiver concerns [With Patient/Caregiver] : With Patient/Caregiver [Designated Healthcare Proxy] : Designated healthcare proxy [Name: ___] : Health Care Proxy's Name: [unfilled]  [Relationship: ___] : Relationship: [unfilled] [] : No [de-identified] : none [de-identified] : low salt/ADA/ low fat [[id number]] : 0 [Change in mental status noted] : No change in mental status noted [Sexually Active] : not sexually active [Reports changes in hearing] : Reports no changes in hearing [Reports changes in vision] : Reports no changes in vision [Guns at Home] : no guns at home [Sunscreen] : does not use sunscreen [Travel to Developing Areas] : does not  travel to developing areas [TB Exposure] : is not being exposed to tuberculosis [MammogramDate] : 04/18 [BoneDensityDate] : 04/18 [FreeTextEntry2] :  [de-identified] : with assistance [AdvancecareDate] : 03/19

## 2019-03-27 ENCOUNTER — APPOINTMENT (OUTPATIENT)
Dept: CARDIOLOGY | Facility: CLINIC | Age: 59
End: 2019-03-27
Payer: COMMERCIAL

## 2019-03-27 ENCOUNTER — NON-APPOINTMENT (OUTPATIENT)
Age: 59
End: 2019-03-27

## 2019-03-27 VITALS
WEIGHT: 178 LBS | RESPIRATION RATE: 15 BRPM | DIASTOLIC BLOOD PRESSURE: 79 MMHG | BODY MASS INDEX: 30.39 KG/M2 | HEART RATE: 78 BPM | HEIGHT: 64 IN | SYSTOLIC BLOOD PRESSURE: 125 MMHG

## 2019-03-27 PROCEDURE — ZZZZZ: CPT

## 2019-03-27 PROCEDURE — 0296T: CPT

## 2019-03-27 PROCEDURE — 99214 OFFICE O/P EST MOD 30 MIN: CPT

## 2019-03-27 PROCEDURE — 93000 ELECTROCARDIOGRAM COMPLETE: CPT | Mod: 59

## 2019-04-03 ENCOUNTER — APPOINTMENT (OUTPATIENT)
Dept: INTERNAL MEDICINE | Facility: CLINIC | Age: 59
End: 2019-04-03

## 2019-04-10 ENCOUNTER — MEDICATION RENEWAL (OUTPATIENT)
Age: 59
End: 2019-04-10

## 2019-04-17 ENCOUNTER — APPOINTMENT (OUTPATIENT)
Dept: VASCULAR SURGERY | Facility: CLINIC | Age: 59
End: 2019-04-17

## 2019-04-17 ENCOUNTER — APPOINTMENT (OUTPATIENT)
Dept: CARDIOLOGY | Facility: CLINIC | Age: 59
End: 2019-04-17
Payer: COMMERCIAL

## 2019-04-17 ENCOUNTER — APPOINTMENT (OUTPATIENT)
Dept: INTERNAL MEDICINE | Facility: CLINIC | Age: 59
End: 2019-04-17
Payer: COMMERCIAL

## 2019-04-17 VITALS
DIASTOLIC BLOOD PRESSURE: 78 MMHG | HEIGHT: 64 IN | TEMPERATURE: 98 F | SYSTOLIC BLOOD PRESSURE: 120 MMHG | OXYGEN SATURATION: 95 % | BODY MASS INDEX: 32.1 KG/M2 | WEIGHT: 188 LBS | HEART RATE: 88 BPM

## 2019-04-17 DIAGNOSIS — R68.2 DRY MOUTH, UNSPECIFIED: ICD-10-CM

## 2019-04-17 PROCEDURE — 99214 OFFICE O/P EST MOD 30 MIN: CPT

## 2019-04-17 PROCEDURE — 0298T: CPT

## 2019-04-17 NOTE — PHYSICAL EXAM
[No Acute Distress] : no acute distress [Well Nourished] : well nourished [Well Developed] : well developed [Well-Appearing] : well-appearing [Normal Voice/Communication] : normal voice/communication [Normal Sclera/Conjunctiva] : normal sclera/conjunctiva [PERRL] : pupils equal round and reactive to light [EOMI] : extraocular movements intact [Normal Outer Ear/Nose] : the outer ears and nose were normal in appearance [Normal Oropharynx] : the oropharynx was normal [No JVD] : no jugular venous distention [Supple] : supple [No Respiratory Distress] : no respiratory distress  [Clear to Auscultation] : lungs were clear to auscultation bilaterally [No Accessory Muscle Use] : no accessory muscle use [Normal Rate] : normal rate  [Regular Rhythm] : with a regular rhythm [Normal S1, S2] : normal S1 and S2 [No Carotid Bruits] : no carotid bruits [No Edema] : there was no peripheral edema [Soft] : abdomen soft [No Extremity Clubbing/Cyanosis] : no extremity clubbing/cyanosis [Normal Bowel Sounds] : normal bowel sounds [No CVA Tenderness] : no CVA  tenderness [No Spinal Tenderness] : no spinal tenderness [No Rash] : no rash [No Focal Deficits] : no focal deficits [Normal Gait] : normal gait [Speech Grossly Normal] : speech grossly normal [Normal Affect] : the affect was normal [Alert and Oriented x3] : oriented to person, place, and time [de-identified] : Wears [place] oxygen; pursed-lip breathing with activity [de-identified] : no ST [de-identified] : no stridor [de-identified] : R=16-20;  very diminished AE; no wheezing [de-identified] : no cords;

## 2019-04-17 NOTE — HEALTH RISK ASSESSMENT
[No falls in past year] : Patient reported no falls in the past year [0] : 2) Feeling down, depressed, or hopeless: Not at all (0) [] : No [de-identified] : cardio [de-identified] : NE [[id number]] : 0 [de-identified] : ADA; low salt; low fat

## 2019-04-17 NOTE — ASSESSMENT
[FreeTextEntry1] : [facility]\par Improved = CR stable at 1.17 and Potassium =4.6 and WNL\par Low potassium diet\par Must use BIPAP for 6-8 hours daily to drive down PCO2\par Remain off Benicar\par On Lasix 40 mg daily as per Dr. Chapa\par \par DM\par Has been in good control\par Last HGBA1C = 5.4%\par Off Metformin\par PT\par Weight control\par ADA diet\par Podiatry and Ophtho f/u\par Monitor glucose, HGBA1C\par Monitor FSG's\par Continue low fat diet and daily statin\par \par HTN\par BP in good range today\par Low salt diet\par Weight control\par Remains on Cardizem 180 mg daily\par Cardiology f/u with Dr. Chapa\par \par COPD\par End-stage\par Awaiting possible lung transplant\par Continue oxygen 3 LPM / 4 LPM with exercise\par BIPAP 6-8 hours per day\par [place]\par Will monitor CT/PFT's\par Had flu vaccine\par No smoking\par Continue Symbicort, Spiriva, Singulair, Charan\par Nebs qid\par Zithromax daily\par Mucinex DM bid\par \par AF\par On Cardizem\par On Eliquis\par Cardiology f/u for ?Holter given occasional palpitations\par Vascular f/u with dopplers planned\par \par RTC 4-6 weeks and as needed\par To call for any medical issues

## 2019-04-17 NOTE — REVIEW OF SYSTEMS
[Vision Problems] : vision problems [Nasal Discharge] : nasal discharge [Postnasal Drip] : postnasal drip [Palpitations] : palpitations [Shortness Of Breath] : shortness of breath [Cough] : cough [Dyspnea on Exertion] : dyspnea on exertion [Heartburn] : heartburn [Incontinence] : incontinence [Joint Pain] : joint pain [Back Pain] : back pain [Insomnia] : insomnia [Anxiety] : anxiety [Easy Bruising] : easy bruising [Negative] : Heme/Lymph

## 2019-04-17 NOTE — HISTORY OF PRESENT ILLNESS
[FreeTextEntry1] : Comes in for f/u medical visit. [de-identified] : Doing about the same.\par Using oxygen and uses BIPAP for 4 hours daily.\par Bruises easily on Eliquis.\par No fever.\par Able to ambulate.\par No calf pain or swelling.\par Normal BM/urination.\par No abdominal pain or n/v.\par Trying to diet.\par Denies chest pain.\par Severe NIELSON with minimal exertion.\par Has cough with white sputum / no hemoptysis.\par Back pain improved.

## 2019-05-10 ENCOUNTER — RX CHANGE (OUTPATIENT)
Age: 59
End: 2019-05-10

## 2019-05-15 ENCOUNTER — APPOINTMENT (OUTPATIENT)
Dept: INTERNAL MEDICINE | Facility: CLINIC | Age: 59
End: 2019-05-15
Payer: COMMERCIAL

## 2019-05-15 VITALS
WEIGHT: 184.5 LBS | SYSTOLIC BLOOD PRESSURE: 146 MMHG | OXYGEN SATURATION: 93 % | TEMPERATURE: 97.6 F | BODY MASS INDEX: 31.12 KG/M2 | HEART RATE: 101 BPM | HEIGHT: 64.5 IN | DIASTOLIC BLOOD PRESSURE: 80 MMHG

## 2019-05-15 PROCEDURE — 99214 OFFICE O/P EST MOD 30 MIN: CPT | Mod: 25

## 2019-05-15 PROCEDURE — 36415 COLL VENOUS BLD VENIPUNCTURE: CPT

## 2019-05-15 RX ORDER — CYCLOBENZAPRINE HYDROCHLORIDE 10 MG/1
10 TABLET, FILM COATED ORAL 3 TIMES DAILY
Qty: 30 | Refills: 0 | Status: DISCONTINUED | COMMUNITY
Start: 2019-04-10 | End: 2019-05-15

## 2019-05-15 NOTE — PHYSICAL EXAM
[No Acute Distress] : no acute distress [Well Nourished] : well nourished [Well Developed] : well developed [Well-Appearing] : well-appearing [Normal Voice/Communication] : normal voice/communication [Normal Sclera/Conjunctiva] : normal sclera/conjunctiva [PERRL] : pupils equal round and reactive to light [EOMI] : extraocular movements intact [Normal Outer Ear/Nose] : the outer ears and nose were normal in appearance [Normal Oropharynx] : the oropharynx was normal [No JVD] : no jugular venous distention [Supple] : supple [No Respiratory Distress] : no respiratory distress  [Clear to Auscultation] : lungs were clear to auscultation bilaterally [No Accessory Muscle Use] : no accessory muscle use [Normal Rate] : normal rate  [Regular Rhythm] : with a regular rhythm [Normal S1, S2] : normal S1 and S2 [No Carotid Bruits] : no carotid bruits [No Edema] : there was no peripheral edema [No Extremity Clubbing/Cyanosis] : no extremity clubbing/cyanosis [Soft] : abdomen soft [Normal Bowel Sounds] : normal bowel sounds [No CVA Tenderness] : no CVA  tenderness [No Spinal Tenderness] : no spinal tenderness [No Rash] : no rash [Normal Gait] : normal gait [No Focal Deficits] : no focal deficits [Speech Grossly Normal] : speech grossly normal [Normal Affect] : the affect was normal [Alert and Oriented x3] : oriented to person, place, and time [de-identified] : Wears [place] oxygen; pursed-lip breathing with activity [de-identified] : no ST [de-identified] : no stridor [de-identified] : R=16-20;  very diminished AE; no wheezing [de-identified] : no cords;

## 2019-05-15 NOTE — HISTORY OF PRESENT ILLNESS
[FreeTextEntry1] : Comes in for f/u medical visit. [de-identified] : Doing about the same.\par Using oxygen at 3 LPM. Not tolerating BIPAP.\par Has been down lately and has not followed through with any of her w/u or MD's.\par Bruises easily on Eliquis.Legs ache.\par No fever.\par Able to ambulate.\par No calf pain or swelling.\par Normal BM/urination.\par No abdominal pain or n/v.\par Trying to diet.\par Denies chest pain.\par Severe NIELSON with minimal exertion.\par Has cough with white sputum / no hemoptysis.\par Back pain improved.

## 2019-05-15 NOTE — HEALTH RISK ASSESSMENT
[No falls in past year] : Patient reported no falls in the past year [0] : 2) Feeling down, depressed, or hopeless: Not at all (0) [] : No [de-identified] : ADA; low salt; low fat [de-identified] : none [[id number]] : 0

## 2019-05-15 NOTE — COUNSELING
[Weight management counseling provided] : Weight management [Healthy eating counseling provided] : healthy eating [Activity counseling provided] : activity [Needs reinforcement, provided] : Patient needs reinforcement on understanding of disease, goals and obesity follow-up plan; reinforcement was provided [Weight Self Once Weekly] : Weight self once weekly [Low Fat Diet] : Low fat diet [Low Salt Diet] : Low salt diet

## 2019-05-17 LAB
25(OH)D3 SERPL-MCNC: 64.2 NG/ML
ALBUMIN SERPL ELPH-MCNC: 4.6 G/DL
ALP BLD-CCNC: 68 U/L
ALT SERPL-CCNC: 15 U/L
ANION GAP SERPL CALC-SCNC: 17 MMOL/L
APPEARANCE: CLEAR
AST SERPL-CCNC: 20 U/L
BACTERIA UR CULT: NORMAL
BACTERIA: NEGATIVE
BASOPHILS # BLD AUTO: 0.03 K/UL
BASOPHILS NFR BLD AUTO: 0.3 %
BILIRUB SERPL-MCNC: 0.3 MG/DL
BILIRUBIN URINE: NEGATIVE
BLOOD URINE: NEGATIVE
BUN SERPL-MCNC: 24 MG/DL
CALCIUM SERPL-MCNC: 10.2 MG/DL
CHLORIDE SERPL-SCNC: 92 MMOL/L
CHOLEST SERPL-MCNC: 183 MG/DL
CHOLEST/HDLC SERPL: 2.1 RATIO
CO2 SERPL-SCNC: 34 MMOL/L
COLOR: NORMAL
CREAT SERPL-MCNC: 1.19 MG/DL
CREAT SPEC-SCNC: 40 MG/DL
EOSINOPHIL # BLD AUTO: 0.18 K/UL
EOSINOPHIL NFR BLD AUTO: 2 %
ESTIMATED AVERAGE GLUCOSE: 120 MG/DL
FOLATE SERPL-MCNC: >20 NG/ML
GLUCOSE QUALITATIVE U: NEGATIVE
GLUCOSE SERPL-MCNC: 109 MG/DL
HBA1C MFR BLD HPLC: 5.8 %
HCT VFR BLD CALC: 40.3 %
HDLC SERPL-MCNC: 87 MG/DL
HGB BLD-MCNC: 11.8 G/DL
HYALINE CASTS: 1 /LPF
IMM GRANULOCYTES NFR BLD AUTO: 0.2 %
IRON SERPL-MCNC: 72 UG/DL
KETONES URINE: NEGATIVE
LDLC SERPL CALC-MCNC: 82 MG/DL
LEUKOCYTE ESTERASE URINE: NEGATIVE
LYMPHOCYTES # BLD AUTO: 1.64 K/UL
LYMPHOCYTES NFR BLD AUTO: 18.6 %
MAN DIFF?: NORMAL
MCHC RBC-ENTMCNC: 25.9 PG
MCHC RBC-ENTMCNC: 29.3 GM/DL
MCV RBC AUTO: 88.6 FL
MICROALBUMIN 24H UR DL<=1MG/L-MCNC: 1.9 MG/DL
MICROALBUMIN/CREAT 24H UR-RTO: 48 MG/G
MICROSCOPIC-UA: NORMAL
MONOCYTES # BLD AUTO: 0.55 K/UL
MONOCYTES NFR BLD AUTO: 6.2 %
NEUTROPHILS # BLD AUTO: 6.39 K/UL
NEUTROPHILS NFR BLD AUTO: 72.7 %
NITRITE URINE: NEGATIVE
PH URINE: 7
PLATELET # BLD AUTO: 260 K/UL
POTASSIUM SERPL-SCNC: 4.8 MMOL/L
PROT SERPL-MCNC: 7.1 G/DL
PROTEIN URINE: NEGATIVE
RBC # BLD: 4.55 M/UL
RBC # FLD: 14.6 %
RED BLOOD CELLS URINE: 1 /HPF
SODIUM SERPL-SCNC: 143 MMOL/L
SPECIFIC GRAVITY URINE: 1.01
SQUAMOUS EPITHELIAL CELLS: 1 /HPF
TRIGL SERPL-MCNC: 69 MG/DL
TSH SERPL-ACNC: 2.71 UIU/ML
UROBILINOGEN URINE: NORMAL
VIT B12 SERPL-MCNC: 726 PG/ML
WBC # FLD AUTO: 8.81 K/UL
WHITE BLOOD CELLS URINE: 1 /HPF

## 2019-06-05 ENCOUNTER — APPOINTMENT (OUTPATIENT)
Dept: CARDIOLOGY | Facility: CLINIC | Age: 59
End: 2019-06-05
Payer: COMMERCIAL

## 2019-06-05 VITALS
RESPIRATION RATE: 16 BRPM | HEART RATE: 80 BPM | WEIGHT: 184 LBS | BODY MASS INDEX: 31.41 KG/M2 | HEIGHT: 64 IN | DIASTOLIC BLOOD PRESSURE: 78 MMHG | SYSTOLIC BLOOD PRESSURE: 125 MMHG

## 2019-06-05 PROCEDURE — 99214 OFFICE O/P EST MOD 30 MIN: CPT

## 2019-06-05 RX ORDER — LEVALBUTEROL HYDROCHLORIDE 0.63 MG/3ML
0.63 SOLUTION RESPIRATORY (INHALATION)
Qty: 120 | Refills: 3 | Status: DISCONTINUED | COMMUNITY
Start: 2019-02-13 | End: 2019-06-05

## 2019-06-05 RX ORDER — ROSUVASTATIN CALCIUM 10 MG/1
10 TABLET, FILM COATED ORAL
Qty: 90 | Refills: 1 | Status: DISCONTINUED | COMMUNITY
Start: 2019-02-13 | End: 2019-06-05

## 2019-06-06 RX ORDER — NITROGLYCERIN 0.4 MG/1
0.4 TABLET SUBLINGUAL
Qty: 4 | Refills: 1 | Status: ACTIVE | COMMUNITY
Start: 2018-04-16 | End: 1900-01-01

## 2019-06-14 ENCOUNTER — MEDICATION RENEWAL (OUTPATIENT)
Age: 59
End: 2019-06-14

## 2019-06-19 ENCOUNTER — MEDICATION RENEWAL (OUTPATIENT)
Age: 59
End: 2019-06-19

## 2019-06-26 ENCOUNTER — MEDICATION RENEWAL (OUTPATIENT)
Age: 59
End: 2019-06-26

## 2019-07-10 ENCOUNTER — APPOINTMENT (OUTPATIENT)
Dept: INTERNAL MEDICINE | Facility: CLINIC | Age: 59
End: 2019-07-10
Payer: COMMERCIAL

## 2019-07-10 VITALS
HEART RATE: 91 BPM | BODY MASS INDEX: 32.1 KG/M2 | OXYGEN SATURATION: 93 % | SYSTOLIC BLOOD PRESSURE: 142 MMHG | TEMPERATURE: 98.4 F | DIASTOLIC BLOOD PRESSURE: 70 MMHG | WEIGHT: 187 LBS

## 2019-07-10 DIAGNOSIS — R92.2 INCONCLUSIVE MAMMOGRAM: ICD-10-CM

## 2019-07-10 DIAGNOSIS — Z12.39 ENCOUNTER FOR OTHER SCREENING FOR MALIGNANT NEOPLASM OF BREAST: ICD-10-CM

## 2019-07-10 PROCEDURE — 36415 COLL VENOUS BLD VENIPUNCTURE: CPT

## 2019-07-10 PROCEDURE — 99214 OFFICE O/P EST MOD 30 MIN: CPT | Mod: 25

## 2019-07-10 NOTE — PHYSICAL EXAM
[No Acute Distress] : no acute distress [Well Nourished] : well nourished [Well Developed] : well developed [Normal Sclera/Conjunctiva] : normal sclera/conjunctiva [Normal Voice/Communication] : normal voice/communication [Well-Appearing] : well-appearing [PERRL] : pupils equal round and reactive to light [Normal Outer Ear/Nose] : the outer ears and nose were normal in appearance [EOMI] : extraocular movements intact [Normal Oropharynx] : the oropharynx was normal [No JVD] : no jugular venous distention [No Respiratory Distress] : no respiratory distress  [Clear to Auscultation] : lungs were clear to auscultation bilaterally [Supple] : supple [Normal Rate] : normal rate  [No Accessory Muscle Use] : no accessory muscle use [Normal S1, S2] : normal S1 and S2 [Regular Rhythm] : with a regular rhythm [No Edema] : there was no peripheral edema [No Extremity Clubbing/Cyanosis] : no extremity clubbing/cyanosis [Normal Bowel Sounds] : normal bowel sounds [Soft] : abdomen soft [No Spinal Tenderness] : no spinal tenderness [No CVA Tenderness] : no CVA  tenderness [No Rash] : no rash [Normal Gait] : normal gait [Speech Grossly Normal] : speech grossly normal [No Focal Deficits] : no focal deficits [Normal Affect] : the affect was normal [Alert and Oriented x3] : oriented to person, place, and time [de-identified] : Wears [place] oxygen; pursed-lip breathing with activity [de-identified] : no ST [de-identified] : no stridor [de-identified] : R=16-20;  very diminished AE; no wheezing [de-identified] : no cords;

## 2019-07-10 NOTE — HISTORY OF PRESENT ILLNESS
[FreeTextEntry1] : Comes in for f/u medical visit. [de-identified] : Feels that her breathing remains labored and that it is progressively worsening.\par Using oxygen at 3 LPM. Not tolerating BIPAP.\par Has been depressed lately and has not followed through with any of her w/u or MD's.\par Gets LE cramping.\par No fever.\par Able to ambulate.\par No calf pain or swelling.\par Normal BM/urination.\par No abdominal pain or n/v.\par Trying to diet. Very upset that her weight is up.\par Denies chest pain.\par Severe NIELSON with minimal exertion.\par Has cough with white sputum / no hemoptysis.

## 2019-07-10 NOTE — COUNSELING
[Healthy eating counseling provided] : healthy eating [Weight management counseling provided] : Weight management [Activity counseling provided] : activity [Weight Self Once Weekly] : Weight self once weekly [Low Fat Diet] : Low fat diet [Low Salt Diet] : Low salt diet [Walking] : Walking [Patient Non-adherent to care plan] : Patient non-adherent to care plan [Patient motivation] : Patient motivation

## 2019-07-10 NOTE — REVIEW OF SYSTEMS
[Vision Problems] : vision problems [Postnasal Drip] : postnasal drip [Palpitations] : palpitations [Nasal Discharge] : nasal discharge [Shortness Of Breath] : shortness of breath [Dyspnea on Exertion] : dyspnea on exertion [Cough] : cough [Heartburn] : heartburn [Incontinence] : incontinence [Joint Pain] : joint pain [Back Pain] : back pain [Insomnia] : insomnia [Anxiety] : anxiety [Easy Bruising] : easy bruising [Negative] : Neurological

## 2019-07-10 NOTE — ASSESSMENT
[FreeTextEntry1] : [facility]\par Last CR stable and Potassium = WNL\par Repeat CMP and magnesium level sent today\par Low potassium diet\par Must use BIPAP for 6-8 hours daily to drive down PCO2\par Remain off Benicar\par On Lasix 40 mg daily as per Dr. Chapa\par \par DM\par Has been in good control\par Off Metformin\par [place]\par Weight control\par ADA diet\par Podiatry and Ophtho f/u\par Monitor glucose, HGBA1C\par Monitor FSG's\par Continue low fat diet and daily statin = lipid studies sent today\par \par HTN\par BP in good range today\par Low salt diet\par Weight control\par Remains on Cardizem 180 mg daily\par Cardiology f/u with Dr. Chapa\par \par COPD\par End-stage\par Awaiting possible lung transplant\par Continue oxygen 3 LPM / 4 LPM with exercise\par BIPAP 6-8 hours per day -  15/5\par [place]\par Will monitor CT/PFT's\par Had flu vaccine\par No smoking\par Continue Symbicort, Spiriva, Singulair, Charan\par Nebs qid\par Zithromax daily\par Mucinex DM bid\par \par AF\par On Cardizem\par On Eliquis\par Cardiology f/u for ?Holter given occasional palpitations\par Vascular f/u with Dopplers planned\par \par RTC 6 weeks and as needed\par To call for any medical issues\par F/U at [facility] - virtual colonoscopy and chest CT planned there\par Will see nutrition for weight control\par Dental f/u regarding dental extractions\par To see GYN for PAP /// to do mammograms and breast US\par Saw ophtho for DM eye exam\par Full labs sent\par Psych f/u or AD declined

## 2019-07-10 NOTE — HEALTH RISK ASSESSMENT
[0] : 1) Little interest or pleasure doing things: Not at all (0) [No falls in past year] : Patient reported no falls in the past year [No] : In the past 12 months have you used drugs other than those required for medical reasons? No [] : No [de-identified] : cardiology [de-identified] : none [[id number]] : 0 [de-identified] : ADA; low salt; low fat

## 2019-07-12 LAB
25(OH)D3 SERPL-MCNC: 55.4 NG/ML
ALBUMIN SERPL ELPH-MCNC: 4.6 G/DL
ALP BLD-CCNC: 76 U/L
ALT SERPL-CCNC: 10 U/L
ANION GAP SERPL CALC-SCNC: 11 MMOL/L
AST SERPL-CCNC: 17 U/L
BASOPHILS # BLD AUTO: 0.03 K/UL
BASOPHILS NFR BLD AUTO: 0.4 %
BILIRUB SERPL-MCNC: 0.3 MG/DL
BUN SERPL-MCNC: 21 MG/DL
CALCIUM SERPL-MCNC: 10.1 MG/DL
CHLORIDE SERPL-SCNC: 94 MMOL/L
CHOLEST SERPL-MCNC: 157 MG/DL
CHOLEST/HDLC SERPL: 1.9 RATIO
CO2 SERPL-SCNC: 38 MMOL/L
CREAT SERPL-MCNC: 1.27 MG/DL
EOSINOPHIL # BLD AUTO: 0.16 K/UL
EOSINOPHIL NFR BLD AUTO: 2.2 %
ESTIMATED AVERAGE GLUCOSE: 114 MG/DL
FOLATE SERPL-MCNC: 12.2 NG/ML
GLUCOSE SERPL-MCNC: 118 MG/DL
HBA1C MFR BLD HPLC: 5.6 %
HCT VFR BLD CALC: 39.7 %
HDLC SERPL-MCNC: 84 MG/DL
HGB BLD-MCNC: 11.8 G/DL
IMM GRANULOCYTES NFR BLD AUTO: 0.1 %
IRON SERPL-MCNC: 54 UG/DL
LDLC SERPL CALC-MCNC: 60 MG/DL
LDLC SERPL DIRECT ASSAY-MCNC: 63 MG/DL
LYMPHOCYTES # BLD AUTO: 1.42 K/UL
LYMPHOCYTES NFR BLD AUTO: 19.5 %
MAGNESIUM SERPL-MCNC: 2 MG/DL
MAN DIFF?: NORMAL
MCHC RBC-ENTMCNC: 26.8 PG
MCHC RBC-ENTMCNC: 29.7 GM/DL
MCV RBC AUTO: 90 FL
MONOCYTES # BLD AUTO: 0.52 K/UL
MONOCYTES NFR BLD AUTO: 7.2 %
NEUTROPHILS # BLD AUTO: 5.13 K/UL
NEUTROPHILS NFR BLD AUTO: 70.6 %
PLATELET # BLD AUTO: 250 K/UL
POTASSIUM SERPL-SCNC: 4.4 MMOL/L
PROT SERPL-MCNC: 7 G/DL
RBC # BLD: 4.41 M/UL
RBC # FLD: 14.2 %
SODIUM SERPL-SCNC: 142 MMOL/L
TRIGL SERPL-MCNC: 67 MG/DL
TSH SERPL-ACNC: 1.7 UIU/ML
VIT B12 SERPL-MCNC: 643 PG/ML
WBC # FLD AUTO: 7.27 K/UL

## 2019-08-12 ENCOUNTER — MEDICATION RENEWAL (OUTPATIENT)
Age: 59
End: 2019-08-12

## 2019-08-26 ENCOUNTER — RX RENEWAL (OUTPATIENT)
Age: 59
End: 2019-08-26

## 2019-09-04 ENCOUNTER — APPOINTMENT (OUTPATIENT)
Dept: INTERNAL MEDICINE | Facility: CLINIC | Age: 59
End: 2019-09-04
Payer: COMMERCIAL

## 2019-09-04 VITALS
HEART RATE: 90 BPM | HEIGHT: 64 IN | OXYGEN SATURATION: 90 % | BODY MASS INDEX: 31.92 KG/M2 | TEMPERATURE: 98.2 F | DIASTOLIC BLOOD PRESSURE: 80 MMHG | SYSTOLIC BLOOD PRESSURE: 150 MMHG | WEIGHT: 187 LBS

## 2019-09-04 DIAGNOSIS — N17.9 ACUTE KIDNEY FAILURE, UNSPECIFIED: ICD-10-CM

## 2019-09-04 PROCEDURE — 99214 OFFICE O/P EST MOD 30 MIN: CPT | Mod: 25

## 2019-09-04 PROCEDURE — 36415 COLL VENOUS BLD VENIPUNCTURE: CPT

## 2019-09-04 NOTE — REVIEW OF SYSTEMS
[Vision Problems] : vision problems [Nasal Discharge] : nasal discharge [Postnasal Drip] : postnasal drip [Palpitations] : palpitations [Shortness Of Breath] : shortness of breath [Cough] : cough [Dyspnea on Exertion] : dyspnea on exertion [Incontinence] : incontinence [Heartburn] : heartburn [Joint Pain] : joint pain [Back Pain] : back pain [Insomnia] : insomnia [Anxiety] : anxiety [Easy Bruising] : easy bruising [Negative] : Heme/Lymph

## 2019-09-04 NOTE — COUNSELING
[Weight management counseling provided] : Weight management [Healthy eating counseling provided] : healthy eating [Weight Self Once Weekly] : Weight self once weekly [Activity counseling provided] : activity [Low Fat Diet] : Low fat diet [Low Salt Diet] : Low salt diet [Walking] : Walking [Patient Non-adherent to care plan] : Patient non-adherent to care plan [Patient motivation] : Patient motivation

## 2019-09-05 PROBLEM — N17.9 AKI (ACUTE KIDNEY INJURY): Status: ACTIVE | Noted: 2019-03-23

## 2019-09-05 NOTE — HISTORY OF PRESENT ILLNESS
[de-identified] : Had a rough summer with the high heat and humidity. Felt that he breathing was very labored and therefore has not followed through with any of her w/u or MD's.\par Did get her dental extractions done and still dealing with dental issues.\par Trying to use BIPAP for 3-4 hours daily.\par No fever.\par Able to ambulate.\par No calf pain or swelling.\par Normal BM/urination.\par No abdominal pain or n/v.\par Trying to diet. Very upset that her weight is not dropping.\par Denies chest pain.\par Severe NIELSON with minimal exertion.\par Has cough with white sputum / no hemoptysis. [FreeTextEntry1] : Comes in for f/u medical visit.

## 2019-09-05 NOTE — ASSESSMENT
[FreeTextEntry1] : [facility]\par Repeat CMP sent today\par Low potassium diet\par Must use BIPAP for 6-8 hours daily to drive down PCO2\par Remain off Benicar\par On Lasix 40 mg daily as per Dr. Chapa\par \par DM\par Has been in good control\par Off Metformin\par [place]\par Weight control\par ADA diet\par Podiatry and Ophtho f/u\par Monitor glucose, HGBA1C\par Monitor FSG's\par Continue low fat diet and daily statin = will monitor lipid profile\par \par HTN\par BP in good range today\par Low salt diet\par Weight control\par Remains on Cardizem 180 mg daily\par Cardiology f/u with Dr. Chapa\par \par COPD\par End-stage\par Awaiting possible lung transplant\par Continue oxygen 3 LPM / 4 LPM with exercise\par BIPAP 6-8 hours per day -  15/5\par [place]\par Will monitor CT/PFT's\par Will need flu vaccine\par No smoking\par Continue Symbicort, Spiriva, Singulair, Charan\par Nebs qid\par Zithromax daily\par Mucinex DM bid\par \par AF\par On Cardizem\par On Eliquis\par Cardiology f/u \par Vascular f/u with Dopplers planned\par \par RTC 4 weeks and as needed\par To call for any medical issues\par F/U at [facility] - virtual colonoscopy and chest CT planned there\par Will see nutrition for weight control\par Dental f/u regarding dental extractions\par To see GYN for PAP /// to do mammograms and breast US\par Saw ophtho for DM eye exam\par Full labs sent\par Psych f/u or AD declined

## 2019-09-05 NOTE — HEALTH RISK ASSESSMENT
[No] : In the past 12 months have you used drugs other than those required for medical reasons? No [No falls in past year] : Patient reported no falls in the past year [0] : 2) Feeling down, depressed, or hopeless: Not at all (0) [de-identified] : cardiology [] : No [de-identified] : none [[id number]] : 0 [de-identified] : ADA; low salt; low fat

## 2019-09-05 NOTE — PHYSICAL EXAM
[No Acute Distress] : no acute distress [Well Nourished] : well nourished [Well Developed] : well developed [Well-Appearing] : well-appearing [Normal Voice/Communication] : normal voice/communication [Normal Sclera/Conjunctiva] : normal sclera/conjunctiva [PERRL] : pupils equal round and reactive to light [EOMI] : extraocular movements intact [Normal Outer Ear/Nose] : the outer ears and nose were normal in appearance [Normal Oropharynx] : the oropharynx was normal [No JVD] : no jugular venous distention [Supple] : supple [No Respiratory Distress] : no respiratory distress  [Clear to Auscultation] : lungs were clear to auscultation bilaterally [No Accessory Muscle Use] : no accessory muscle use [Normal Rate] : normal rate  [Regular Rhythm] : with a regular rhythm [No Edema] : there was no peripheral edema [Normal S1, S2] : normal S1 and S2 [No Extremity Clubbing/Cyanosis] : no extremity clubbing/cyanosis [Normal Bowel Sounds] : normal bowel sounds [Soft] : abdomen soft [No CVA Tenderness] : no CVA  tenderness [No Spinal Tenderness] : no spinal tenderness [Normal Gait] : normal gait [No Rash] : no rash [No Focal Deficits] : no focal deficits [Speech Grossly Normal] : speech grossly normal [Normal Affect] : the affect was normal [Alert and Oriented x3] : oriented to person, place, and time [de-identified] : Wears [place] oxygen; pursed-lip breathing with activity [de-identified] : no ST [de-identified] : no stridor [de-identified] : R=16-20;  very diminished AE; no wheezing [de-identified] : no cords;

## 2019-09-06 LAB
ALBUMIN SERPL ELPH-MCNC: 4.3 G/DL
ALP BLD-CCNC: 80 U/L
ALT SERPL-CCNC: 9 U/L
ANION GAP SERPL CALC-SCNC: 15 MMOL/L
APPEARANCE: CLEAR
AST SERPL-CCNC: 15 U/L
BACTERIA: NEGATIVE
BASOPHILS # BLD AUTO: 0.04 K/UL
BASOPHILS NFR BLD AUTO: 0.5 %
BILIRUB SERPL-MCNC: 0.3 MG/DL
BILIRUBIN URINE: NEGATIVE
BLOOD URINE: NEGATIVE
BUN SERPL-MCNC: 23 MG/DL
CALCIUM SERPL-MCNC: 9.9 MG/DL
CHLORIDE SERPL-SCNC: 93 MMOL/L
CO2 SERPL-SCNC: 34 MMOL/L
COLOR: NORMAL
CREAT SERPL-MCNC: 1.18 MG/DL
CREAT SPEC-SCNC: 59 MG/DL
EOSINOPHIL # BLD AUTO: 0.2 K/UL
EOSINOPHIL NFR BLD AUTO: 2.7 %
GLUCOSE QUALITATIVE U: NEGATIVE
GLUCOSE SERPL-MCNC: 109 MG/DL
HCT VFR BLD CALC: 40.4 %
HGB BLD-MCNC: 11.9 G/DL
HYALINE CASTS: 1 /LPF
IMM GRANULOCYTES NFR BLD AUTO: 0.1 %
KETONES URINE: NEGATIVE
LEUKOCYTE ESTERASE URINE: ABNORMAL
LYMPHOCYTES # BLD AUTO: 1.44 K/UL
LYMPHOCYTES NFR BLD AUTO: 19.5 %
MAN DIFF?: NORMAL
MCHC RBC-ENTMCNC: 26.4 PG
MCHC RBC-ENTMCNC: 29.5 GM/DL
MCV RBC AUTO: 89.8 FL
MICROALBUMIN 24H UR DL<=1MG/L-MCNC: 2.4 MG/DL
MICROALBUMIN/CREAT 24H UR-RTO: 41 MG/G
MICROSCOPIC-UA: NORMAL
MONOCYTES # BLD AUTO: 0.53 K/UL
MONOCYTES NFR BLD AUTO: 7.2 %
NEUTROPHILS # BLD AUTO: 5.15 K/UL
NEUTROPHILS NFR BLD AUTO: 70 %
NITRITE URINE: NEGATIVE
PH URINE: 6.5
PLATELET # BLD AUTO: 248 K/UL
POTASSIUM SERPL-SCNC: 4.1 MMOL/L
PROT SERPL-MCNC: 6.9 G/DL
PROTEIN URINE: NEGATIVE
RBC # BLD: 4.5 M/UL
RBC # FLD: 13 %
RED BLOOD CELLS URINE: 3 /HPF
SODIUM SERPL-SCNC: 141 MMOL/L
SPECIFIC GRAVITY URINE: 1.01
SQUAMOUS EPITHELIAL CELLS: 3 /HPF
UROBILINOGEN URINE: NORMAL
WBC # FLD AUTO: 7.37 K/UL
WHITE BLOOD CELLS URINE: 2 /HPF

## 2019-09-25 ENCOUNTER — NON-APPOINTMENT (OUTPATIENT)
Age: 59
End: 2019-09-25

## 2019-09-25 ENCOUNTER — APPOINTMENT (OUTPATIENT)
Dept: CARDIOLOGY | Facility: CLINIC | Age: 59
End: 2019-09-25
Payer: COMMERCIAL

## 2019-09-25 VITALS
RESPIRATION RATE: 15 BRPM | SYSTOLIC BLOOD PRESSURE: 130 MMHG | DIASTOLIC BLOOD PRESSURE: 79 MMHG | BODY MASS INDEX: 31.24 KG/M2 | OXYGEN SATURATION: 97 % | WEIGHT: 183 LBS | HEART RATE: 78 BPM | HEIGHT: 64 IN

## 2019-09-25 PROCEDURE — 99214 OFFICE O/P EST MOD 30 MIN: CPT

## 2019-09-25 PROCEDURE — 93000 ELECTROCARDIOGRAM COMPLETE: CPT

## 2019-09-27 RX ORDER — ALBUTEROL SULFATE 90 UG/1
108 (90 BASE) INHALANT RESPIRATORY (INHALATION) EVERY 6 HOURS
Qty: 4 | Refills: 3 | Status: COMPLETED | COMMUNITY
Start: 2019-06-19 | End: 2019-09-27

## 2019-09-30 ENCOUNTER — RX RENEWAL (OUTPATIENT)
Age: 59
End: 2019-09-30

## 2019-10-02 ENCOUNTER — RX RENEWAL (OUTPATIENT)
Age: 59
End: 2019-10-02

## 2019-10-14 ENCOUNTER — APPOINTMENT (OUTPATIENT)
Dept: INTERNAL MEDICINE | Facility: CLINIC | Age: 59
End: 2019-10-14

## 2019-10-15 ENCOUNTER — APPOINTMENT (OUTPATIENT)
Dept: INTERNAL MEDICINE | Facility: CLINIC | Age: 59
End: 2019-10-15
Payer: COMMERCIAL

## 2019-10-15 VITALS
HEART RATE: 82 BPM | OXYGEN SATURATION: 91 % | WEIGHT: 186.9 LBS | DIASTOLIC BLOOD PRESSURE: 60 MMHG | SYSTOLIC BLOOD PRESSURE: 130 MMHG | BODY MASS INDEX: 32.08 KG/M2 | TEMPERATURE: 98.4 F

## 2019-10-15 PROCEDURE — 99214 OFFICE O/P EST MOD 30 MIN: CPT

## 2019-10-15 NOTE — ASSESSMENT
[FreeTextEntry1] : Suspect acute URI with congestion and cough\par Rest\par Fluids\par Mucinex DM bid\par Continue Zithromax\par Add Ceftin 500 mg bid for 7-10 days\par Gargles/lozenges\par Steam\par Saline nasal rinses\par Flonase daily\par \par Emphysema/COPD\par Exacerbating in setting of acute URI\par Continue oxygen 24 hours daily via [place]\par Continue BIPAP uses it at least 4 hours daily - can increase use\par Continue Symbicort, Spiriva, Singulair, Charan\par Use albuterol nebs for 5-10 minutes every 4 -6 hours\par Prednisone 60 mg daily - written taper given\par To call if worse or if not improving and to go to ER\par To call for any medical / pulmonary issues\par \par RTC 1 week and as needed\par D/W patient and Michele and all questions answered

## 2019-10-15 NOTE — HEALTH RISK ASSESSMENT
[No] : In the past 12 months have you used drugs other than those required for medical reasons? No [No falls in past year] : Patient reported no falls in the past year [0] : 2) Feeling down, depressed, or hopeless: Not at all (0) [] : No [de-identified] : none [de-identified] : cardiology [[id number]] : 0 [de-identified] : regular

## 2019-10-15 NOTE — HISTORY OF PRESENT ILLNESS
[Family Member] : family member [FreeTextEntry8] : Comes in for acute medical visit.\par Got sick over weekend.\par Denies calf pain or increased edema.\par Denies abdominal pain or n/v/d.\par Denies chest pain.\par Has hacking dry cough with scant yellow mucus.\par Denies hemoptysis.\par Denies fevers.\par Increased SOB.\par No ear pain or sore throat.\par Has nasal symptoms

## 2019-10-15 NOTE — PHYSICAL EXAM
[No Acute Distress] : no acute distress [Well Nourished] : well nourished [Well Developed] : well developed [Well-Appearing] : well-appearing [Normal Voice/Communication] : normal voice/communication [Normal Sclera/Conjunctiva] : normal sclera/conjunctiva [PERRL] : pupils equal round and reactive to light [EOMI] : extraocular movements intact [Normal Outer Ear/Nose] : the outer ears and nose were normal in appearance [Normal Oropharynx] : the oropharynx was normal [Normal TMs] : both tympanic membranes were normal [No JVD] : no jugular venous distention [No Lymphadenopathy] : no lymphadenopathy [Supple] : supple [Thyroid Normal, No Nodules] : the thyroid was normal and there were no nodules present [No Respiratory Distress] : no respiratory distress  [No Accessory Muscle Use] : no accessory muscle use [Clear to Auscultation] : lungs were clear to auscultation bilaterally [Normal Rate] : normal rate  [Regular Rhythm] : with a regular rhythm [Normal S1, S2] : normal S1 and S2 [No Edema] : there was no peripheral edema [No Extremity Clubbing/Cyanosis] : no extremity clubbing/cyanosis [Soft] : abdomen soft [Normal Bowel Sounds] : normal bowel sounds [No CVA Tenderness] : no CVA  tenderness [No Spinal Tenderness] : no spinal tenderness [No Rash] : no rash [No Focal Deficits] : no focal deficits [Normal Gait] : normal gait [Speech Grossly Normal] : speech grossly normal [Normal Affect] : the affect was normal [Alert and Oriented x3] : oriented to person, place, and time [de-identified] : No ST [de-identified] : No stridor [de-identified] : R=20; no wheezing; very diminished AE [de-identified] : no cords

## 2019-10-15 NOTE — REVIEW OF SYSTEMS
[Fatigue] : fatigue [Vision Problems] : vision problems [Nasal Discharge] : nasal discharge [Postnasal Drip] : postnasal drip [Palpitations] : palpitations [Shortness Of Breath] : shortness of breath [Wheezing] : wheezing [Cough] : cough [Dyspnea on Exertion] : dyspnea on exertion [Heartburn] : heartburn [Incontinence] : incontinence [Joint Pain] : joint pain [Back Pain] : back pain [Insomnia] : insomnia [Anxiety] : anxiety [Easy Bruising] : easy bruising [Negative] : Heme/Lymph

## 2019-10-17 ENCOUNTER — APPOINTMENT (OUTPATIENT)
Dept: OBGYN | Facility: CLINIC | Age: 59
End: 2019-10-17
Payer: COMMERCIAL

## 2019-10-17 ENCOUNTER — MEDICATION RENEWAL (OUTPATIENT)
Age: 59
End: 2019-10-17

## 2019-10-17 VITALS
BODY MASS INDEX: 31.65 KG/M2 | DIASTOLIC BLOOD PRESSURE: 80 MMHG | HEIGHT: 64 IN | WEIGHT: 185.38 LBS | SYSTOLIC BLOOD PRESSURE: 160 MMHG

## 2019-10-17 DIAGNOSIS — Z01.419 ENCOUNTER FOR GYNECOLOGICAL EXAMINATION (GENERAL) (ROUTINE) W/OUT ABNORMAL FINDINGS: ICD-10-CM

## 2019-10-17 DIAGNOSIS — N64.4 MASTODYNIA: ICD-10-CM

## 2019-10-17 PROCEDURE — 99396 PREV VISIT EST AGE 40-64: CPT

## 2019-10-17 NOTE — PROCEDURE
[Tolerated Well] : the patient tolerated the procedure well [Cervical Pap Smear] : cervical Pap smear [No Complications] : there were no complications

## 2019-10-17 NOTE — PHYSICAL EXAM
[Awake] : awake [LAD] : no lymphadenopathy [Acute Distress] : no acute distress [Alert] : alert [Goiter] : no goiter [Thyroid Nodule] : no thyroid nodule [Mass] : no breast mass [Nipple Discharge] : no nipple discharge [Axillary LAD] : no axillary lymphadenopathy [Soft] : soft [Tender] : non tender [H/Smegaly] : no hepatosplenomegaly [Distended] : not distended [Oriented x3] : oriented to person, place, and time [Depressed Mood] : not depressed [Flat Affect] : affect not flat [Normal] : uterus [No Bleeding] : there was no active vaginal bleeding [Uterine Adnexae] : were not tender and not enlarged

## 2019-10-17 NOTE — HISTORY OF PRESENT ILLNESS
[Last Colonoscopy ___] : Last colonoscopy [unfilled] [Last Mammogram ___] : Last Mammogram was [unfilled] [Sexually Active] : is not sexually active [Last Pap ___] : Last cervical pap smear was [unfilled]

## 2019-10-18 LAB — HPV HIGH+LOW RISK DNA PNL CVX: NOT DETECTED

## 2019-10-21 ENCOUNTER — FORM ENCOUNTER (OUTPATIENT)
Age: 59
End: 2019-10-21

## 2019-10-22 ENCOUNTER — APPOINTMENT (OUTPATIENT)
Dept: MAMMOGRAPHY | Facility: IMAGING CENTER | Age: 59
End: 2019-10-22
Payer: COMMERCIAL

## 2019-10-22 ENCOUNTER — OUTPATIENT (OUTPATIENT)
Dept: OUTPATIENT SERVICES | Facility: HOSPITAL | Age: 59
LOS: 1 days | End: 2019-10-22
Payer: COMMERCIAL

## 2019-10-22 ENCOUNTER — APPOINTMENT (OUTPATIENT)
Dept: ULTRASOUND IMAGING | Facility: IMAGING CENTER | Age: 59
End: 2019-10-22
Payer: COMMERCIAL

## 2019-10-22 DIAGNOSIS — R92.2 INCONCLUSIVE MAMMOGRAM: ICD-10-CM

## 2019-10-22 DIAGNOSIS — Z12.39 ENCOUNTER FOR OTHER SCREENING FOR MALIGNANT NEOPLASM OF BREAST: ICD-10-CM

## 2019-10-22 PROCEDURE — 77063 BREAST TOMOSYNTHESIS BI: CPT | Mod: 26

## 2019-10-22 PROCEDURE — 77067 SCR MAMMO BI INCL CAD: CPT | Mod: 26

## 2019-10-22 PROCEDURE — 77063 BREAST TOMOSYNTHESIS BI: CPT

## 2019-10-22 PROCEDURE — 76641 ULTRASOUND BREAST COMPLETE: CPT | Mod: 26,50

## 2019-10-22 PROCEDURE — 76641 ULTRASOUND BREAST COMPLETE: CPT

## 2019-10-22 PROCEDURE — 77067 SCR MAMMO BI INCL CAD: CPT

## 2019-10-23 ENCOUNTER — APPOINTMENT (OUTPATIENT)
Dept: INTERNAL MEDICINE | Facility: CLINIC | Age: 59
End: 2019-10-23
Payer: COMMERCIAL

## 2019-10-23 VITALS
BODY MASS INDEX: 32.03 KG/M2 | SYSTOLIC BLOOD PRESSURE: 146 MMHG | OXYGEN SATURATION: 96 % | HEART RATE: 98 BPM | DIASTOLIC BLOOD PRESSURE: 80 MMHG | TEMPERATURE: 98 F | WEIGHT: 186.6 LBS

## 2019-10-23 DIAGNOSIS — J06.9 ACUTE UPPER RESPIRATORY INFECTION, UNSPECIFIED: ICD-10-CM

## 2019-10-23 PROCEDURE — 36415 COLL VENOUS BLD VENIPUNCTURE: CPT

## 2019-10-23 PROCEDURE — 99214 OFFICE O/P EST MOD 30 MIN: CPT | Mod: 25

## 2019-10-23 NOTE — PHYSICAL EXAM
[No Acute Distress] : no acute distress [Well Nourished] : well nourished [Well Developed] : well developed [Well-Appearing] : well-appearing [Normal Voice/Communication] : normal voice/communication [Normal Sclera/Conjunctiva] : normal sclera/conjunctiva [PERRL] : pupils equal round and reactive to light [Normal Outer Ear/Nose] : the outer ears and nose were normal in appearance [Normal Oropharynx] : the oropharynx was normal [EOMI] : extraocular movements intact [Supple] : supple [No Respiratory Distress] : no respiratory distress  [No Accessory Muscle Use] : no accessory muscle use [Clear to Auscultation] : lungs were clear to auscultation bilaterally [Normal Rate] : normal rate  [Regular Rhythm] : with a regular rhythm [No Edema] : there was no peripheral edema [Normal S1, S2] : normal S1 and S2 [Soft] : abdomen soft [No Extremity Clubbing/Cyanosis] : no extremity clubbing/cyanosis [Normal Bowel Sounds] : normal bowel sounds [No CVA Tenderness] : no CVA  tenderness [No Spinal Tenderness] : no spinal tenderness [No Rash] : no rash [No Focal Deficits] : no focal deficits [Speech Grossly Normal] : speech grossly normal [Normal Gait] : normal gait [Alert and Oriented x3] : oriented to person, place, and time [Normal Affect] : the affect was normal [de-identified] : No ST [de-identified] : No stridor [de-identified] : R=20; no wheezing; very diminished AE [de-identified] : no cords

## 2019-10-23 NOTE — ASSESSMENT
[FreeTextEntry1] : Slowly recovering from acute URI with congestion and cough\par Labs below sent today\par Mucinex DM bid\par Continue Zithromax\par Complete Ceftin 500 mg bid for 7 days\par Gargles/lozenges\par Steam\par Saline nasal rinses\par Flonase daily\par \par Emphysema/COPD\par Exacerbating in setting of acute URI - mild clinical improvement\par Continue oxygen 24 hours daily via NC\par Continue BIPAP uses it at least 4 hours daily - can increase use\par Continue Symbicort, Spiriva, Singulair, Charan\par Use albuterol nebs for 5-10 minutes every 4 -6 hours (RX for portable nebulizer given)\par Prednisone 60 mg daily - written taper given\par To call if worse or if not improving and to go to ER\par To call for any medical / pulmonary issues\par \par RTC 2 weeks and as needed\par D/W patient and all questions answered\par Will take flu vaccine at next visit - declined today

## 2019-10-23 NOTE — REVIEW OF SYSTEMS
[Fatigue] : fatigue [Vision Problems] : vision problems [Nasal Discharge] : nasal discharge [Postnasal Drip] : postnasal drip [Palpitations] : palpitations [Shortness Of Breath] : shortness of breath [Wheezing] : wheezing [Cough] : cough [Heartburn] : heartburn [Incontinence] : incontinence [Dyspnea on Exertion] : dyspnea on exertion [Joint Pain] : joint pain [Back Pain] : back pain [Insomnia] : insomnia [Anxiety] : anxiety [Easy Bruising] : easy bruising [Negative] : Heme/Lymph [Muscle Pain] : muscle pain

## 2019-10-23 NOTE — HEALTH RISK ASSESSMENT
[No] : In the past 12 months have you used drugs other than those required for medical reasons? No [No falls in past year] : Patient reported no falls in the past year [0] : 2) Feeling down, depressed, or hopeless: Not at all (0) [] : No [de-identified] : GYN [de-identified] : none [de-identified] : regular [[id number]] : 0

## 2019-10-24 LAB — CYTOLOGY CVX/VAG DOC THIN PREP: ABNORMAL

## 2019-10-25 LAB
ALBUMIN SERPL ELPH-MCNC: 4.2 G/DL
ALP BLD-CCNC: 76 U/L
ALT SERPL-CCNC: 9 U/L
ANION GAP SERPL CALC-SCNC: 13 MMOL/L
AST SERPL-CCNC: 11 U/L
BASOPHILS # BLD AUTO: 0.02 K/UL
BASOPHILS NFR BLD AUTO: 0.2 %
BILIRUB SERPL-MCNC: 0.2 MG/DL
BUN SERPL-MCNC: 30 MG/DL
CALCIUM SERPL-MCNC: 9.9 MG/DL
CHLORIDE SERPL-SCNC: 92 MMOL/L
CO2 SERPL-SCNC: 33 MMOL/L
CREAT SERPL-MCNC: 1.22 MG/DL
EOSINOPHIL # BLD AUTO: 0.01 K/UL
EOSINOPHIL NFR BLD AUTO: 0.1 %
GLUCOSE SERPL-MCNC: 254 MG/DL
HCT VFR BLD CALC: 40 %
HGB BLD-MCNC: 12.3 G/DL
IMM GRANULOCYTES NFR BLD AUTO: 1.1 %
LYMPHOCYTES # BLD AUTO: 0.7 K/UL
LYMPHOCYTES NFR BLD AUTO: 5.6 %
MAGNESIUM SERPL-MCNC: 2.1 MG/DL
MAN DIFF?: NORMAL
MCHC RBC-ENTMCNC: 26.6 PG
MCHC RBC-ENTMCNC: 30.8 GM/DL
MCV RBC AUTO: 86.6 FL
MONOCYTES # BLD AUTO: 0.15 K/UL
MONOCYTES NFR BLD AUTO: 1.2 %
NEUTROPHILS # BLD AUTO: 11.52 K/UL
NEUTROPHILS NFR BLD AUTO: 91.8 %
PLATELET # BLD AUTO: 367 K/UL
POTASSIUM SERPL-SCNC: 5.8 MMOL/L
PROT SERPL-MCNC: 6.7 G/DL
RBC # BLD: 4.62 M/UL
RBC # FLD: 13.4 %
SODIUM SERPL-SCNC: 138 MMOL/L
WBC # FLD AUTO: 12.54 K/UL

## 2019-10-29 ENCOUNTER — FORM ENCOUNTER (OUTPATIENT)
Age: 59
End: 2019-10-29

## 2019-10-30 ENCOUNTER — OUTPATIENT (OUTPATIENT)
Dept: OUTPATIENT SERVICES | Facility: HOSPITAL | Age: 59
LOS: 1 days | End: 2019-10-30
Payer: COMMERCIAL

## 2019-10-30 ENCOUNTER — APPOINTMENT (OUTPATIENT)
Dept: ULTRASOUND IMAGING | Facility: IMAGING CENTER | Age: 59
End: 2019-10-30
Payer: COMMERCIAL

## 2019-10-30 DIAGNOSIS — R92.2 INCONCLUSIVE MAMMOGRAM: ICD-10-CM

## 2019-10-30 DIAGNOSIS — Z12.39 ENCOUNTER FOR OTHER SCREENING FOR MALIGNANT NEOPLASM OF BREAST: ICD-10-CM

## 2019-10-30 PROCEDURE — 76642 ULTRASOUND BREAST LIMITED: CPT | Mod: 26,RT

## 2019-10-30 PROCEDURE — 76642 ULTRASOUND BREAST LIMITED: CPT

## 2019-10-31 ENCOUNTER — RX RENEWAL (OUTPATIENT)
Age: 59
End: 2019-10-31

## 2019-11-08 ENCOUNTER — RX RENEWAL (OUTPATIENT)
Age: 59
End: 2019-11-08

## 2019-11-13 ENCOUNTER — APPOINTMENT (OUTPATIENT)
Dept: INTERNAL MEDICINE | Facility: CLINIC | Age: 59
End: 2019-11-13

## 2019-11-20 ENCOUNTER — MEDICATION RENEWAL (OUTPATIENT)
Age: 59
End: 2019-11-20

## 2019-12-11 ENCOUNTER — NON-APPOINTMENT (OUTPATIENT)
Age: 59
End: 2019-12-11

## 2019-12-11 ENCOUNTER — MEDICATION RENEWAL (OUTPATIENT)
Age: 59
End: 2019-12-11

## 2019-12-11 ENCOUNTER — INPATIENT (INPATIENT)
Facility: HOSPITAL | Age: 59
LOS: 1 days | Discharge: HOME | DRG: 274 | End: 2019-12-13
Attending: INTERNAL MEDICINE | Admitting: INTERNAL MEDICINE
Payer: COMMERCIAL

## 2019-12-11 ENCOUNTER — APPOINTMENT (OUTPATIENT)
Dept: INTERNAL MEDICINE | Facility: CLINIC | Age: 59
End: 2019-12-11
Payer: COMMERCIAL

## 2019-12-11 VITALS
SYSTOLIC BLOOD PRESSURE: 140 MMHG | DIASTOLIC BLOOD PRESSURE: 100 MMHG | HEART RATE: 150 BPM | OXYGEN SATURATION: 98 % | RESPIRATION RATE: 16 BRPM | TEMPERATURE: 98 F

## 2019-12-11 VITALS
HEART RATE: 154 BPM | DIASTOLIC BLOOD PRESSURE: 80 MMHG | OXYGEN SATURATION: 99 % | HEIGHT: 64 IN | WEIGHT: 191.3 LBS | TEMPERATURE: 98.1 F | SYSTOLIC BLOOD PRESSURE: 130 MMHG | BODY MASS INDEX: 32.66 KG/M2

## 2019-12-11 DIAGNOSIS — I48.92 UNSPECIFIED ATRIAL FLUTTER: ICD-10-CM

## 2019-12-11 LAB
ALBUMIN SERPL ELPH-MCNC: 4.5 G/DL — SIGNIFICANT CHANGE UP (ref 3.3–5)
ALP SERPL-CCNC: 70 U/L — SIGNIFICANT CHANGE UP (ref 40–120)
ALT FLD-CCNC: 15 U/L — SIGNIFICANT CHANGE UP (ref 10–45)
ANION GAP SERPL CALC-SCNC: 14 MMOL/L — SIGNIFICANT CHANGE UP (ref 5–17)
APTT BLD: 30.1 SEC — SIGNIFICANT CHANGE UP (ref 27.5–36.3)
AST SERPL-CCNC: 14 U/L — SIGNIFICANT CHANGE UP (ref 10–40)
BILIRUB SERPL-MCNC: 0.5 MG/DL — SIGNIFICANT CHANGE UP (ref 0.2–1.2)
BILIRUB UR QL STRIP: NORMAL
BUN SERPL-MCNC: 24 MG/DL — HIGH (ref 7–23)
CALCIUM SERPL-MCNC: 9.9 MG/DL — SIGNIFICANT CHANGE UP (ref 8.4–10.5)
CHLORIDE SERPL-SCNC: 87 MMOL/L — LOW (ref 96–108)
CO2 SERPL-SCNC: 36 MMOL/L — HIGH (ref 22–31)
CREAT SERPL-MCNC: 1.11 MG/DL — SIGNIFICANT CHANGE UP (ref 0.5–1.3)
GLUCOSE SERPL-MCNC: 197 MG/DL — HIGH (ref 70–99)
GLUCOSE UR-MCNC: NORMAL
HCG UR QL: 0.2 EU/DL
HCT VFR BLD CALC: 38.3 % — SIGNIFICANT CHANGE UP (ref 34.5–45)
HGB BLD-MCNC: 11.4 G/DL — LOW (ref 11.5–15.5)
HGB UR QL STRIP.AUTO: NORMAL
INR BLD: 1.33 RATIO — HIGH (ref 0.88–1.16)
KETONES UR-MCNC: NORMAL
LACTATE BLDV-MCNC: 2 MMOL/L — SIGNIFICANT CHANGE UP (ref 0.7–2)
LEUKOCYTE ESTERASE UR QL STRIP: NORMAL
MAGNESIUM SERPL-MCNC: 1.8 MG/DL — SIGNIFICANT CHANGE UP (ref 1.6–2.6)
MCHC RBC-ENTMCNC: 25.3 PG — LOW (ref 27–34)
MCHC RBC-ENTMCNC: 29.8 GM/DL — LOW (ref 32–36)
MCV RBC AUTO: 84.9 FL — SIGNIFICANT CHANGE UP (ref 80–100)
NITRITE UR QL STRIP: NORMAL
NRBC # BLD: 0 /100 WBCS — SIGNIFICANT CHANGE UP (ref 0–0)
PH UR STRIP: 5.5
PLATELET # BLD AUTO: 256 K/UL — SIGNIFICANT CHANGE UP (ref 150–400)
POTASSIUM SERPL-MCNC: 4.3 MMOL/L — SIGNIFICANT CHANGE UP (ref 3.5–5.3)
POTASSIUM SERPL-SCNC: 4.3 MMOL/L — SIGNIFICANT CHANGE UP (ref 3.5–5.3)
PROT SERPL-MCNC: 7.4 G/DL — SIGNIFICANT CHANGE UP (ref 6–8.3)
PROT UR STRIP-MCNC: NORMAL
PROTHROM AB SERPL-ACNC: 15.3 SEC — HIGH (ref 10–12.9)
RAPID RVP RESULT: SIGNIFICANT CHANGE UP
RBC # BLD: 4.51 M/UL — SIGNIFICANT CHANGE UP (ref 3.8–5.2)
RBC # FLD: 13.2 % — SIGNIFICANT CHANGE UP (ref 10.3–14.5)
SODIUM SERPL-SCNC: 137 MMOL/L — SIGNIFICANT CHANGE UP (ref 135–145)
SP GR UR STRIP: 1.01
TROPONIN T, HIGH SENSITIVITY RESULT: 50 NG/L — SIGNIFICANT CHANGE UP (ref 0–51)
WBC # BLD: 18.34 K/UL — HIGH (ref 3.8–10.5)
WBC # FLD AUTO: 18.34 K/UL — HIGH (ref 3.8–10.5)

## 2019-12-11 PROCEDURE — 99214 OFFICE O/P EST MOD 30 MIN: CPT | Mod: 25

## 2019-12-11 PROCEDURE — 93010 ELECTROCARDIOGRAM REPORT: CPT | Mod: 59

## 2019-12-11 PROCEDURE — 99291 CRITICAL CARE FIRST HOUR: CPT

## 2019-12-11 PROCEDURE — 99221 1ST HOSP IP/OBS SF/LOW 40: CPT

## 2019-12-11 PROCEDURE — 71250 CT THORAX DX C-: CPT | Mod: 26

## 2019-12-11 PROCEDURE — 93000 ELECTROCARDIOGRAM COMPLETE: CPT

## 2019-12-11 PROCEDURE — 71045 X-RAY EXAM CHEST 1 VIEW: CPT | Mod: 26

## 2019-12-11 PROCEDURE — 36415 COLL VENOUS BLD VENIPUNCTURE: CPT

## 2019-12-11 PROCEDURE — 81003 URINALYSIS AUTO W/O SCOPE: CPT | Mod: QW

## 2019-12-11 RX ORDER — DEXTROSE 50 % IN WATER 50 %
15 SYRINGE (ML) INTRAVENOUS ONCE
Refills: 0 | Status: DISCONTINUED | OUTPATIENT
Start: 2019-12-11 | End: 2019-12-11

## 2019-12-11 RX ORDER — DILTIAZEM HCL 120 MG
10 CAPSULE, EXT RELEASE 24 HR ORAL
Qty: 125 | Refills: 0 | Status: DISCONTINUED | OUTPATIENT
Start: 2019-12-11 | End: 2019-12-11

## 2019-12-11 RX ORDER — INSULIN LISPRO 100/ML
VIAL (ML) SUBCUTANEOUS
Refills: 0 | Status: DISCONTINUED | OUTPATIENT
Start: 2019-12-11 | End: 2019-12-11

## 2019-12-11 RX ORDER — CEFTRIAXONE 500 MG/1
1000 INJECTION, POWDER, FOR SOLUTION INTRAMUSCULAR; INTRAVENOUS ONCE
Refills: 0 | Status: COMPLETED | OUTPATIENT
Start: 2019-12-11 | End: 2019-12-11

## 2019-12-11 RX ORDER — FUROSEMIDE 40 MG
40 TABLET ORAL DAILY
Refills: 0 | Status: DISCONTINUED | OUTPATIENT
Start: 2019-12-11 | End: 2019-12-13

## 2019-12-11 RX ORDER — PANTOPRAZOLE SODIUM 20 MG/1
40 TABLET, DELAYED RELEASE ORAL
Refills: 0 | Status: DISCONTINUED | OUTPATIENT
Start: 2019-12-11 | End: 2019-12-13

## 2019-12-11 RX ORDER — FINASTERIDE 5 MG/1
5 TABLET, FILM COATED ORAL DAILY
Refills: 0 | Status: DISCONTINUED | OUTPATIENT
Start: 2019-12-11 | End: 2019-12-11

## 2019-12-11 RX ORDER — TIOTROPIUM BROMIDE 18 UG/1
1 CAPSULE ORAL; RESPIRATORY (INHALATION) DAILY
Refills: 0 | Status: DISCONTINUED | OUTPATIENT
Start: 2019-12-11 | End: 2019-12-13

## 2019-12-11 RX ORDER — DEXTROSE 50 % IN WATER 50 %
25 SYRINGE (ML) INTRAVENOUS ONCE
Refills: 0 | Status: DISCONTINUED | OUTPATIENT
Start: 2019-12-11 | End: 2019-12-11

## 2019-12-11 RX ORDER — AZITHROMYCIN 500 MG/1
250 TABLET, FILM COATED ORAL
Refills: 0 | Status: DISCONTINUED | OUTPATIENT
Start: 2019-12-11 | End: 2019-12-11

## 2019-12-11 RX ORDER — IPRATROPIUM BROMIDE 0.2 MG/ML
500 SOLUTION, NON-ORAL INHALATION
Refills: 0 | Status: COMPLETED | OUTPATIENT
Start: 2019-12-11 | End: 2019-12-11

## 2019-12-11 RX ORDER — DILTIAZEM HCL 120 MG
10 CAPSULE, EXT RELEASE 24 HR ORAL ONCE
Refills: 0 | Status: COMPLETED | OUTPATIENT
Start: 2019-12-11 | End: 2019-12-11

## 2019-12-11 RX ORDER — THEOPHYLLINE ANHYDROUS 200 MG
400 TABLET, EXTENDED RELEASE 12 HR ORAL DAILY
Refills: 0 | Status: DISCONTINUED | OUTPATIENT
Start: 2019-12-11 | End: 2019-12-11

## 2019-12-11 RX ORDER — GLUCAGON INJECTION, SOLUTION 0.5 MG/.1ML
1 INJECTION, SOLUTION SUBCUTANEOUS ONCE
Refills: 0 | Status: DISCONTINUED | OUTPATIENT
Start: 2019-12-11 | End: 2019-12-11

## 2019-12-11 RX ORDER — SODIUM CHLORIDE 9 MG/ML
1000 INJECTION INTRAMUSCULAR; INTRAVENOUS; SUBCUTANEOUS ONCE
Refills: 0 | Status: COMPLETED | OUTPATIENT
Start: 2019-12-11 | End: 2019-12-11

## 2019-12-11 RX ORDER — AZITHROMYCIN 500 MG/1
250 TABLET, FILM COATED ORAL DAILY
Refills: 0 | Status: DISCONTINUED | OUTPATIENT
Start: 2019-12-11 | End: 2019-12-13

## 2019-12-11 RX ORDER — CEFUROXIME AXETIL 500 MG/1
500 TABLET ORAL
Qty: 14 | Refills: 0 | Status: DISCONTINUED | COMMUNITY
Start: 2019-10-15 | End: 2019-12-11

## 2019-12-11 RX ORDER — SODIUM CHLORIDE 0.65 %
1 AEROSOL, SPRAY (ML) NASAL
Qty: 0 | Refills: 0 | DISCHARGE

## 2019-12-11 RX ORDER — DEXTROSE 50 % IN WATER 50 %
25 SYRINGE (ML) INTRAVENOUS ONCE
Refills: 0 | Status: DISCONTINUED | OUTPATIENT
Start: 2019-12-11 | End: 2019-12-13

## 2019-12-11 RX ORDER — CARVEDILOL PHOSPHATE 80 MG/1
3.12 CAPSULE, EXTENDED RELEASE ORAL EVERY 12 HOURS
Refills: 0 | Status: DISCONTINUED | OUTPATIENT
Start: 2019-12-11 | End: 2019-12-11

## 2019-12-11 RX ORDER — INSULIN GLARGINE 100 [IU]/ML
15 INJECTION, SOLUTION SUBCUTANEOUS AT BEDTIME
Refills: 0 | Status: DISCONTINUED | OUTPATIENT
Start: 2019-12-11 | End: 2019-12-11

## 2019-12-11 RX ORDER — BUDESONIDE AND FORMOTEROL FUMARATE DIHYDRATE 160; 4.5 UG/1; UG/1
2 AEROSOL RESPIRATORY (INHALATION)
Refills: 0 | Status: DISCONTINUED | OUTPATIENT
Start: 2019-12-11 | End: 2019-12-13

## 2019-12-11 RX ORDER — AZITHROMYCIN 500 MG/1
500 TABLET, FILM COATED ORAL ONCE
Refills: 0 | Status: COMPLETED | OUTPATIENT
Start: 2019-12-11 | End: 2019-12-11

## 2019-12-11 RX ORDER — DILTIAZEM HCL 120 MG
15 CAPSULE, EXT RELEASE 24 HR ORAL
Qty: 125 | Refills: 0 | Status: DISCONTINUED | OUTPATIENT
Start: 2019-12-11 | End: 2019-12-12

## 2019-12-11 RX ORDER — DILTIAZEM HCL 120 MG
15 CAPSULE, EXT RELEASE 24 HR ORAL
Qty: 125 | Refills: 0 | Status: DISCONTINUED | OUTPATIENT
Start: 2019-12-11 | End: 2019-12-11

## 2019-12-11 RX ORDER — APIXABAN 2.5 MG/1
5 TABLET, FILM COATED ORAL
Refills: 0 | Status: DISCONTINUED | OUTPATIENT
Start: 2019-12-11 | End: 2019-12-13

## 2019-12-11 RX ORDER — ASPIRIN/CALCIUM CARB/MAGNESIUM 324 MG
324 TABLET ORAL ONCE
Refills: 0 | Status: COMPLETED | OUTPATIENT
Start: 2019-12-11 | End: 2019-12-11

## 2019-12-11 RX ORDER — APIXABAN 2.5 MG/1
5 TABLET, FILM COATED ORAL
Refills: 0 | Status: DISCONTINUED | OUTPATIENT
Start: 2019-12-11 | End: 2019-12-11

## 2019-12-11 RX ORDER — DILTIAZEM HCL 120 MG
180 CAPSULE, EXT RELEASE 24 HR ORAL DAILY
Refills: 0 | Status: DISCONTINUED | OUTPATIENT
Start: 2019-12-11 | End: 2019-12-11

## 2019-12-11 RX ORDER — ALLOPURINOL 300 MG
300 TABLET ORAL DAILY
Refills: 0 | Status: DISCONTINUED | OUTPATIENT
Start: 2019-12-11 | End: 2019-12-11

## 2019-12-11 RX ORDER — ASPIRIN/CALCIUM CARB/MAGNESIUM 324 MG
81 TABLET ORAL DAILY
Refills: 0 | Status: DISCONTINUED | OUTPATIENT
Start: 2019-12-11 | End: 2019-12-11

## 2019-12-11 RX ORDER — MONTELUKAST 4 MG/1
10 TABLET, CHEWABLE ORAL DAILY
Refills: 0 | Status: DISCONTINUED | OUTPATIENT
Start: 2019-12-11 | End: 2019-12-13

## 2019-12-11 RX ORDER — SODIUM CHLORIDE 9 MG/ML
1000 INJECTION, SOLUTION INTRAVENOUS
Refills: 0 | Status: DISCONTINUED | OUTPATIENT
Start: 2019-12-11 | End: 2019-12-11

## 2019-12-11 RX ORDER — ACETAMINOPHEN 500 MG
650 TABLET ORAL ONCE
Refills: 0 | Status: COMPLETED | OUTPATIENT
Start: 2019-12-11 | End: 2019-12-11

## 2019-12-11 RX ORDER — IPRATROPIUM BROMIDE 0.2 MG/ML
500 SOLUTION, NON-ORAL INHALATION ONCE
Refills: 0 | Status: COMPLETED | OUTPATIENT
Start: 2019-12-11 | End: 2019-12-11

## 2019-12-11 RX ORDER — ATORVASTATIN CALCIUM 80 MG/1
40 TABLET, FILM COATED ORAL AT BEDTIME
Refills: 0 | Status: DISCONTINUED | OUTPATIENT
Start: 2019-12-11 | End: 2019-12-13

## 2019-12-11 RX ORDER — TAMSULOSIN HYDROCHLORIDE 0.4 MG/1
0.4 CAPSULE ORAL AT BEDTIME
Refills: 0 | Status: DISCONTINUED | OUTPATIENT
Start: 2019-12-11 | End: 2019-12-11

## 2019-12-11 RX ORDER — DEXTROSE 50 % IN WATER 50 %
12.5 SYRINGE (ML) INTRAVENOUS ONCE
Refills: 0 | Status: DISCONTINUED | OUTPATIENT
Start: 2019-12-11 | End: 2019-12-11

## 2019-12-11 RX ORDER — PIPERACILLIN AND TAZOBACTAM 4; .5 G/20ML; G/20ML
3.38 INJECTION, POWDER, LYOPHILIZED, FOR SOLUTION INTRAVENOUS EVERY 8 HOURS
Refills: 0 | Status: DISCONTINUED | OUTPATIENT
Start: 2019-12-11 | End: 2019-12-11

## 2019-12-11 RX ADMIN — Medication 650 MILLIGRAM(S): at 16:00

## 2019-12-11 RX ADMIN — Medication 10 MILLIGRAM(S): at 16:58

## 2019-12-11 RX ADMIN — AZITHROMYCIN 250 MILLIGRAM(S): 500 TABLET, FILM COATED ORAL at 16:58

## 2019-12-11 RX ADMIN — Medication 650 MILLIGRAM(S): at 18:55

## 2019-12-11 RX ADMIN — Medication 500 MICROGRAM(S): at 16:59

## 2019-12-11 RX ADMIN — Medication 10 MG/HR: at 18:32

## 2019-12-11 RX ADMIN — CEFTRIAXONE 100 MILLIGRAM(S): 500 INJECTION, POWDER, FOR SOLUTION INTRAMUSCULAR; INTRAVENOUS at 16:12

## 2019-12-11 RX ADMIN — Medication 500 MICROGRAM(S): at 16:00

## 2019-12-11 RX ADMIN — SODIUM CHLORIDE 1000 MILLILITER(S): 9 INJECTION INTRAMUSCULAR; INTRAVENOUS; SUBCUTANEOUS at 15:00

## 2019-12-11 RX ADMIN — Medication 324 MILLIGRAM(S): at 15:59

## 2019-12-11 RX ADMIN — Medication 500 MICROGRAM(S): at 15:00

## 2019-12-11 NOTE — CONSULT NOTE ADULT - ASSESSMENT
60 yo F with a PMH significant for COPD on home O2 3L, HTN, HLD, CAD s/p x6 stents?, DM, pHTN, PVD who presents with rapid AFlutter.    -Patient will go for AFlutter ablation tomorrow. Please keep NPO at midnight.  -Type and screen tomorrow AM.  -Continue rate control strategy for now with Cardizem gtt @10.  -Please also continue Eliquis both for tonight and tomorrow morning.    EPS will continue to follow.    Case discussed with Dr. Forrest.    Ondina Clement MD PGY-4  Cardiology Fellow  All Cardiology service information can be found 24/7 on [email or web address], password: [email or web address]  Note is preliminary until signed by the attending.

## 2019-12-11 NOTE — H&P ADULT - NSICDXFAMILYHX_GEN_ALL_CORE_FT
FAMILY HISTORY:  FH: CABG (coronary artery bypass surgery)  FH: MI (myocardial infarction)
FAMILY HISTORY:  FH: CABG (coronary artery bypass surgery)  FH: MI (myocardial infarction)

## 2019-12-11 NOTE — H&P ADULT - HISTORY OF PRESENT ILLNESS
HPI:  58 yo F       h/o    COPD on home O2 3L  , HTN,   HLD  , CAD s/p x6 stents,  , DM, pHTN, PVD     . Patient was hospitalized in 1/2019 for COPD exacerbation.     EP was previously consulted due to her having new onset AFib with RVR  ; s/P successful DCCV 1/7 and was discharged in NSR. Was started on Eliquis 5mg PO BID at that time for [id number] 4, and continued on diltiazem for rate control. Had received amiodarone briefly, but,  call placed to [facility] because she was being evaluated for lung transplant at that time; spoke with Transplant pulmonologist, Dr Mattson, who stated that amiodarone did not interfere with lung transplant workup. It was decided that patient would not get amiodarone.    Patient is now presenting with SOB.   Telemetry monitoring concerning for AFlutter with HR 150s. HPI:  58 yo F       h/o    COPD on home O2 3L  , HTN,   HLD  , CAD s/p  3  stents,  , DM, pHTN, PVD     . Patient was hospitalized in 1/2019 for COPD exacerbation.     EP was previously consulted due to her having new onset AFib with RVR  ; s/P successful DCCV 1/7 and was discharged in NSR. Was started on Eliquis 5mg PO BID at that time for [id number] 4, and continued on diltiazem for rate control. Had received amiodarone briefly, but,  call placed to [facility] because she was being evaluated for lung transplant at that time; spoke with Transplant pulmonologist, Dr Mattson, who stated that amiodarone did not interfere with lung transplant workup. It was decided that patient would not get amiodarone.    was  in [facility] 2 weeks  ago  . with septic  shock per   wife . wih  e coli bacteremia. uti/  right  ureteralstent    Patient is now presenting with SOB.   Telemetry monitoring concerning for AFlutter with HR 150s.

## 2019-12-11 NOTE — HISTORY OF PRESENT ILLNESS
[Family Member] : family member [FreeTextEntry8] : Comes in for acute medical visit.\par Not well for 2 weeks.\par Legs heavy.\par Left arm feels "warm"\par Feeling very bad since Sunday.\par Severely labored breathing.\par No wheezing.\par Denies chest pain, hemoptysis, palpitations, increased cough/sputum.\par Denies URI.

## 2019-12-11 NOTE — ED PROVIDER NOTE - OBJECTIVE STATEMENT
60yo F pmhx HTN HLD afib on eliquis CAD COPD on 3L oxygen at baseline p/w CC SOB, worsening over past 3 days, dry cough, not responsive to home breathing treatments, was at her doctors office today and sent in for elevated HR. Denies fever chills cp n/v/d abd pain.

## 2019-12-11 NOTE — ED ADULT NURSE NOTE - OBJECTIVE STATEMENT
58 yo female presents to ED via EMS for rapid afib. Patient reports she was at regular visit when she was found to be in rapid afib to 150s. Patient has hx of COPD on 3L O2 around the clock, emphysema, HTN. Patient reports feeling weak since Sunday and has had increasingly weakness and feeling SOB and tachypneic. Patient denies CP, nvd, fever/chills, sick contacts, falls/loc. Patient A&Ox3, tachypneic breathing on 3L O2, airway patent, abdomen nontender, +pulses, cap refill <2 seconds. Patient resting in bed, side rails up, plan of care explained.

## 2019-12-11 NOTE — H&P ADULT - HISTORY OF PRESENT ILLNESS
HPI:  60 yo F with a PMH significant for COPD on home O2 3L, HTN, HLD, CAD s/p x6 stents?, , pHTN, PVD. Patient was hospitalized in 1/2019 for COPD exacerbation. EP was previously consulted due to her having new onset AFib with RVR; s/P successful DCCV 1/7 and was discharged in NSR. Was started on Eliquis 5mg PO BID at that time for [id number] 4, and continued on diltiazem for rate control. Had received amiodarone briefly, but, was call placed to [facility] because she was being evaluated for lung transplant at that time; spoke with Transplant pulmonologist, Dr Mattson, who stated that amiodarone did not interfere with lung transplant workup. It was decided that patient would not get amiodarone.    Patient is now presenting with SOB. Telemetry monitoring concerning for AFlutter with

## 2019-12-11 NOTE — ED PROVIDER NOTE - SEVERE SEPSIS ALERT DETAILS
Attending MD Castillo:  The patient is fluid overloaded therefore 30cc/kg of IV fluid is contraindicated at this time. Patient given appropriate volume of fluid for their clinical status and I will continue to monitor status.

## 2019-12-11 NOTE — CONSULT NOTE ADULT - SUBJECTIVE AND OBJECTIVE BOX
In-House Electrophysiology Consult Note  -----------------------------------------------------    Patient seen and evaluated at bedside in ED    HPI:  59 yo F with a PMH significant for COPD on home O2 3L, HTN, HLD, CAD s/p x6 stents?, DM, pHTN, PVD. Patient was hospitalized in 1/2019 for COPD exacerbation. EP was previously consulted due to her having new onset AFib with RVR; s/P successful DCCV 1/7 and was discharged in NSR. Was started on Eliquis 5mg PO BID at that time for [id number] 4, and continued on diltiazem for rate control. Had received amiodarone briefly, but, was call placed to [facility] because she was being evaluated for lung transplant at that time; spoke with Transplant pulmonologist, Dr Mattson, who stated that amiodarone did not interfere with lung transplant workup. It was decided that patient would not get amiodarone.    PMHx:   Hyperlipemia  Chronic sinusitis  Raynaud phenomenon  HTN (hypertension)  DM (diabetes mellitus)  Claustrophobia  COPD (chronic obstructive pulmonary disease)    PSHx:   S/P tonsillectomy    Allergies:  albuterol (Unknown)  No Known Allergies    Home Meds:  ASA 81mg  Crestor 10mg PO daily  Diltiazem 180mg PO daily  Protonix 40mg PO daily  Singulair 10mg PO daily  Spiriva 18  Symbicort 160  Theophylline 400mg    Current Medications:   azithromycin  IVPB 500 milliGRAM(s) IV Intermittent once  diltiazem Injectable 10 milliGRAM(s) IV Push Once  ipratropium  for Nebulization.. 500 MICROGram(s) Nebulizer every 20 minutes    FAMILY HISTORY:  FH: MI (myocardial infarction)  FH: CABG (coronary artery bypass surgery)    Social History:  Smoking History: 30 ppd, quit 3 years ago  Alcohol Use: social  Drug Use: denies    REVIEW OF SYSTEMS:  CONSTITUTIONAL: No weakness, fevers or chills  EYES/ENT: No visual changes;  No dysphagia  NECK: No pain or stiffness  RESPIRATORY: No cough, wheezing, hemoptysis; No shortness of breath  CARDIOVASCULAR: No chest pain or palpitations; No lower extremity edema  GASTROINTESTINAL: No abdominal or epigastric pain. No nausea, vomiting, or hematemesis; No diarrhea or constipation. No melena or hematochezia.  BACK: No back pain  GENITOURINARY: No dysuria, frequency or hematuria  NEUROLOGICAL: No numbness or weakness  SKIN: No itching, burning, rashes, or lesions   All other review of systems is negative unless indicated above.    Physical Exam:  T(F): 98.8 (12-11), Max: 98.8 (12-11)  HR: 154 (12-11) (150 - 154)  BP: 145/91 (12-11) (135/76 - 145/91)  RR: 28 (12-11)  SpO2: 98% (12-11)  GENERAL: No acute distress, well-developed  HEAD:  Atraumatic, Normocephalic  ENT: EOMI, PERRLA, conjunctiva and sclera clear, Neck supple, No JVD, moist mucosa  CHEST/LUNG: Clear to auscultation bilaterally; No wheeze, equal breath sounds bilaterally   BACK: No spinal tenderness  HEART: Regular rate and rhythm; No murmurs, rubs, or gallops  ABDOMEN: Soft, Nontender, Nondistended; Bowel sounds present  EXTREMITIES:  No clubbing, cyanosis, or edema  PSYCH: Nl behavior, nl affect  NEUROLOGY: AAOx3, non-focal, cranial nerves intact  SKIN: Normal color, No rashes or lesions  LINES:    CXR: Personally reviewed    Labs: Personally reviewed                        11.4   18.34 )-----------( 256      ( 11 Dec 2019 14:31 )             38.3     12-11    137  |  87<L>  |  24<H>  ----------------------------<  197<H>  4.3   |  36<H>  |  1.11    Ca    9.9      11 Dec 2019 14:31  Mg     1.8     12-11    TPro  7.4  /  Alb  4.5  /  TBili  0.5  /  DBili  x   /  AST  14  /  ALT  15  /  AlkPhos  70  12-11    PT/INR - ( 11 Dec 2019 14:31 )   PT: 15.3 sec;   INR: 1.33 ratio      PTT - ( 11 Dec 2019 14:31 )  PTT:30.1 sec    CARDIAC MARKERS ( 11 Dec 2019 14:31 )  50 ng/L / x     / x     / x     / x     / x In-House Electrophysiology Consult Note  -----------------------------------------------------    Patient seen and evaluated at bedside in ED    HPI:  60 yo F with a PMH significant for COPD on home O2 3L, HTN, HLD, CAD s/p x6 stents?, DM, pHTN, PVD. Patient was hospitalized in 1/2019 for COPD exacerbation. EP was previously consulted due to her having new onset AFib with RVR; s/P successful DCCV 1/7 and was discharged in NSR. Was started on Eliquis 5mg PO BID at that time for [id number] 4, and continued on diltiazem for rate control. Had received amiodarone briefly, but, was call placed to [facility] because she was being evaluated for lung transplant at that time; spoke with Transplant pulmonologist, Dr Mattson, who stated that amiodarone did not interfere with lung transplant workup. It was decided that patient would not get amiodarone.    Patient is now presenting with SOB. Telemetry monitoring concerning for AFlutter with HR 150s. EPS consulted for further management.    PMHx:   Hyperlipemia  Chronic sinusitis  Raynaud phenomenon  HTN (hypertension)  DM (diabetes mellitus)  Claustrophobia  COPD (chronic obstructive pulmonary disease)    PSHx:   S/P tonsillectomy    Allergies:  albuterol (Unknown)  No Known Allergies    Home Meds:  ASA 81mg  Crestor 10mg PO daily  Diltiazem 180mg PO daily  Protonix 40mg PO daily  Singulair 10mg PO daily  Spiriva 18  Symbicort 160  Theophylline 400mg    Current Medications:   azithromycin  IVPB 500 milliGRAM(s) IV Intermittent once  diltiazem Injectable 10 milliGRAM(s) IV Push Once  ipratropium  for Nebulization.. 500 MICROGram(s) Nebulizer every 20 minutes    FAMILY HISTORY:  FH: MI (myocardial infarction)  FH: CABG (coronary artery bypass surgery)    Social History:  Smoking History: 30 ppd, quit 3 years ago  Alcohol Use: social  Drug Use: denies    REVIEW OF SYSTEMS:  CONSTITUTIONAL: No weakness, fevers or chills  EYES/ENT: No visual changes;  No dysphagia  NECK: No pain or stiffness  RESPIRATORY: No cough, wheezing, hemoptysis; No shortness of breath  CARDIOVASCULAR: No chest pain or palpitations; No lower extremity edema  GASTROINTESTINAL: No abdominal or epigastric pain. No nausea, vomiting, or hematemesis; No diarrhea or constipation. No melena or hematochezia.  BACK: No back pain  GENITOURINARY: No dysuria, frequency or hematuria  NEUROLOGICAL: No numbness or weakness  SKIN: No itching, burning, rashes, or lesions   All other review of systems is negative unless indicated above.    Physical Exam:  T(F): 98.8 (12-11), Max: 98.8 (12-11)  HR: 154 (12-11) (150 - 154)  BP: 145/91 (12-11) (135/76 - 145/91)  RR: 28 (12-11)  SpO2: 98% (12-11)  GENERAL: No acute distress, well-developed  HEAD:  Atraumatic, Normocephalic  ENT: EOMI, PERRLA, conjunctiva and sclera clear, Neck supple, No JVD, moist mucosa  CHEST/LUNG: Clear to auscultation bilaterally; No wheeze, equal breath sounds bilaterally   HEART: Fast rate and regular rhythm; No murmurs, rubs, or gallops  ABDOMEN: Soft, Nontender, Nondistended; Bowel sounds present  EXTREMITIES:  No clubbing, cyanosis, or edema  PSYCH: Nl behavior, nl affect  NEUROLOGY: AAOx3, non-focal, cranial nerves intact  SKIN: Normal color, No rashes or lesions    CXR: Personally reviewed    Labs: Personally reviewed                        11.4   18.34 )-----------( 256      ( 11 Dec 2019 14:31 )             38.3     12-11    137  |  87<L>  |  24<H>  ----------------------------<  197<H>  4.3   |  36<H>  |  1.11    Ca    9.9      11 Dec 2019 14:31  Mg     1.8     12-11    TPro  7.4  /  Alb  4.5  /  TBili  0.5  /  DBili  x   /  AST  14  /  ALT  15  /  AlkPhos  70  12-11    PT/INR - ( 11 Dec 2019 14:31 )   PT: 15.3 sec;   INR: 1.33 ratio      PTT - ( 11 Dec 2019 14:31 )  PTT:30.1 sec    CARDIAC MARKERS ( 11 Dec 2019 14:31 )  50 ng/L / x     / x     / x     / x     / x

## 2019-12-11 NOTE — ED PROVIDER NOTE - ATTENDING CONTRIBUTION TO CARE
dr sahu,   dr tavarez- 59 F w/ PMH Of COPD (quit 2 years ago) on 3 L home o2, HTN, HLD, CAD s/p 6 stents, DM, pHTN, PVD presents to the ED w/ tachycardia and sob. pt states symptoms started on Sunday w/ sob and difficulty breathing, has been doing nebulizers at home but not helping. Pt went to see PCP who recommended she report to the ED. EKG was faxed to Dr. Sahu (cardiology) who felt the patient was in SVT.   UPon arrival to the ED, HR is in the 150s, appears to be in aflutter 2/2 sepsis w/ pulmonary source, given hx of oxygen requirement and concern for volume overload   will give 1 L of IVF.   pt reports compliance w/ diltizem and eliquis.   on exam, pt is tachycardic and tachypneic w/ poor airmovement bilaterally, will rx w/ steroids, ipatropium and reassess.   Spoke w/ dr sahu,  who recommends dr tavarez for admission and EP consultation  Pts ekg appears to be aflutter, hr is persistently elevated s/p fluids, will try cardizem case signed out to Dr. Salomon on 12/11/19 at 1620

## 2019-12-11 NOTE — ED ADULT NURSE REASSESSMENT NOTE - NS ED NURSE REASSESS COMMENT FT1
Pt currently reports wants something to eat, gave pt a tray. Educated pt not to have anything to eat after 12AM. Admitting team also notified regarding pt's HR. Will medicate as per orders.

## 2019-12-11 NOTE — H&P ADULT - NSHPLABSRESULTS_GEN_ALL_CORE
LABS:                        11.4   18.34 )-----------( 256      ( 11 Dec 2019 14:31 )             38.3     12-11    137  |  87<L>  |  24<H>  ----------------------------<  197<H>  4.3   |  36<H>  |  1.11    Ca    9.9      11 Dec 2019 14:31  Mg     1.8     12-11    TPro  7.4  /  Alb  4.5  /  TBili  0.5  /  DBili  x   /  AST  14  /  ALT  15  /  AlkPhos  70  12-11    PT/INR - ( 11 Dec 2019 14:31 )   PT: 15.3 sec;   INR: 1.33 ratio         PTT - ( 11 Dec 2019 14:31 )  PTT:30.1 sec
LABS:                        11.4   18.34 )-----------( 256      ( 11 Dec 2019 14:31 )             38.3     12-11    137  |  87<L>  |  24<H>  ----------------------------<  197<H>  4.3   |  36<H>  |  1.11    Ca    9.9      11 Dec 2019 14:31  Mg     1.8     12-11    TPro  7.4  /  Alb  4.5  /  TBili  0.5  /  DBili  x   /  AST  14  /  ALT  15  /  AlkPhos  70  12-11    PT/INR - ( 11 Dec 2019 14:31 )   PT: 15.3 sec;   INR: 1.33 ratio         PTT - ( 11 Dec 2019 14:31 )  PTT:30.1 sec

## 2019-12-11 NOTE — H&P ADULT - ASSESSMENT
HPI:  60 yo F with a PMH significant for COPD on home O2 3L, HTN, HLD, CAD s/p x6 stents  , , pHTN, PVD./ normal e f   Patient was hospitalized in 1/2019 for COPD exacerbation. EP was previously consulted due to her having new onset AFib with RVR; s/P successful DCCV 1/7 and was discharged in NSR. Was started on Eliquis 5mg PO BID at that time for [id number] 4, and continued on diltiazem for rate control    Patient is now presenting with SOB  . Telemetry/ AFlutter with   seen by ep  on cardizem drip  for  ablation in am    continue  eliquis   npo  after  midnight   family at bedside     < from: CT Chest No Cont (12.11.19 @ 18:06) >  INTERPRETATION:  No urgent/emergent findings.  Follow up final report.  < end of copied text >

## 2019-12-11 NOTE — ED PROVIDER NOTE - PROGRESS NOTE DETAILS
spoke w/ Dr. Chapa requesting EP consult and admission Attd:  Received sign out on patient pending EP evaluation and admission.  Received cardizem 10mg IVP x1 in Emergency Department without change in rate.  EP evaluated patient at bedside, recommending starting cardizem gtt at 10mg/hr, admission to medicine, possible ablation tomorrow.  Kendell Salomon M.D.

## 2019-12-11 NOTE — ED ADULT TRIAGE NOTE - CHIEF COMPLAINT QUOTE
c/o high heart rate 150 bpm    PMH: wesley, anemia, breast ca, cad, copd, gerd, DM, MI, HTN, HLD, afib, CHF

## 2019-12-11 NOTE — ED PROVIDER NOTE - CRITICAL CARE PROVIDED
consultation with other physicians/documentation/additional history taking/interpretation of diagnostic studies/direct patient care (not related to procedure)

## 2019-12-11 NOTE — HEALTH RISK ASSESSMENT
[No] : In the past 12 months have you used drugs other than those required for medical reasons? No [No falls in past year] : Patient reported no falls in the past year [(PHQ-2) Unable to screen] : PHQ-2: unable to screen [de-identified] : none [] : No [de-identified] : regular

## 2019-12-11 NOTE — REVIEW OF SYSTEMS
[Fatigue] : fatigue [Vision Problems] : vision problems [Nasal Discharge] : nasal discharge [Postnasal Drip] : postnasal drip [Palpitations] : palpitations [Shortness Of Breath] : shortness of breath [Cough] : cough [Wheezing] : wheezing [Incontinence] : incontinence [Dyspnea on Exertion] : dyspnea on exertion [Heartburn] : heartburn [Muscle Pain] : muscle pain [Joint Pain] : joint pain [Back Pain] : back pain [Anxiety] : anxiety [Easy Bruising] : easy bruising [Insomnia] : insomnia [Negative] : Neurological

## 2019-12-11 NOTE — H&P ADULT - ASSESSMENT
60 yo F    h/o    COPD on home O2 3L, HTN,   HLD,   CAD s/p x6 stents,    , DM, pHTN,    PVD     who presents with rapid AFlutter.      seen by  EP, . recommended   Cardizem gtt @10.    and  to    continue Eliquis both for tonight and tomorrow morning.  h/o  CAD/ on asa/ statin  COPD, on spiriva, theophylline, singular, symbicort  elevated  wbc  noted/ pt  afebrile  ct chest, prelim, no  pna   labs in am/ follow  wbc    follow  wbc     < from: CT Chest No Cont (12.11.19 @ 18:06) >  INTERPRETATION:  No urgent/emergent findings.  Follow up final report.  < end of copied text >     < from: Transthoracic Echocardiogram (12.07.18 @ 08:59) >  ----------------------------------------------------------------------  Conclusions:  1. Normal mitral valve.  2. Normal trileaflet aortic valve. Peak transaortic valve  gradient equals 6 mm Hg, mean transaortic valve gradient  equals 3 mm Hg, aortic valve velocity time integral equals  22 cm. Trace aortic regurgitation.  3. Aortic Root: 2.9 cm.  4. Normal left atrium.  LA volume index = 18 cc/m2.  5. Normal left ventricular internal dimensions and wall  thicknesses.  6. Normal left ventricular systolic function. No segmental  wall motion abnormalities.  7. Mild diastolic dysfunction (Stage I).  8. Normal right ventricular size and function.  9. Inadequate tricuspid regurgitation Doppler envelope  precludes estimation of RVSP.  10. Normal tricuspid valve. Minimal tricuspid  regurgitation.  < end of copied text >     < from: Cardiac Cath Lab - Adult (03.24.17 @ 11:16) >  VENTRICLES: Global left ventricular function was normal. EF estimated was  65 %.  CORONARY VESSELS: The coronary circulation is right dominant.  LM:   --  Mid left main: There was a 30 % stenosis.  LAD:   --  Ostial LAD: There was a 40 % stenosis.  CX:   --  Circumflex: Normal.  RCA:   --  RCA: Normal.  COMPLICATIONS: The  < end of copied text > 60 yo F    h/o    COPD on home O2 3L, HTN,   HLD,   CAD s/p  3  stents,   more t aponte  10 yrs  ago   , DM, pHTN,    PVD     who presents with rapid AFlutter.      seen by  EP, . recommended   Cardizem gtt @10.    and  to    continue Eliquis both for tonight and tomorrow morning.  h/o  CAD/ on asa/ statin  COPD, on spiriva, theophylline, singular, symbicort  elevated  wbc  noted/ pt  afebrile  ct chest, prelim, no  pna   labs in am/ follow  wbc    follow  wbc in am      h/p  recent septic shock/ wa s intubated/   ecoli bacteremia/ uti. with  right ureteral  stent , d/c  on 11/ 28/19,  on ceftin, which pt has  completed   ct abdomen, pending/ ID eval  follow  blood/  urine  c/s/ on  zosyn   pt is  not cleared   for  ablation at present     < from: CT Chest No Cont (12.11.19 @ 18:06) >  INTERPRETATION:  No urgent/emergent findings.  Follow up final report.  < end of copied text >     < from: Transthoracic Echocardiogram (12.07.18 @ 08:59) >  ----------------------------------------------------------------------  Conclusions:  1. Normal mitral valve.  2. Normal trileaflet aortic valve. Peak transaortic valve  gradient equals 6 mm Hg, mean transaortic valve gradient  equals 3 mm Hg, aortic valve velocity time integral equals  22 cm. Trace aortic regurgitation.  3. Aortic Root: 2.9 cm.  4. Normal left atrium.  LA volume index = 18 cc/m2.  5. Normal left ventricular internal dimensions and wall  thicknesses.  6. Normal left ventricular systolic function. No segmental  wall motion abnormalities.  7. Mild diastolic dysfunction (Stage I).  8. Normal right ventricular size and function.  9. Inadequate tricuspid regurgitation Doppler envelope  precludes estimation of RVSP.  10. Normal tricuspid valve. Minimal tricuspid  regurgitation.  < end of copied text >     < from: Cardiac Cath Lab - Adult (03.24.17 @ 11:16) >  VENTRICLES: Global left ventricular function was normal. EF estimated was  65 %.  CORONARY VESSELS: The coronary circulation is right dominant.  LM:   --  Mid left main: There was a 30 % stenosis.  LAD:   --  Ostial LAD: There was a 40 % stenosis.  CX:   --  Circumflex: Normal.  RCA:   --  RCA: Normal.  COMPLICATIONS: The  < end of copied text >

## 2019-12-11 NOTE — ED ADULT NURSE REASSESSMENT NOTE - NS ED NURSE REASSESS COMMENT FT1
Pt observed sitting up in stretcher conversing with RN, breathing spontaneous and tachypneic. Pt maintained on 4L NC, continuous pulse ox and cardiac monitor, a fib noted. Pt to be medicated as per MD order. Safety and comfort measures initiated- bed placed in lowest position and side rails raised. Pt oriented to call bell system.

## 2019-12-11 NOTE — CONSULT NOTE ADULT - ATTENDING COMMENTS
seen and examined with fellow. I agree with H & P, A & P.  Review of old ECGs from Jan show A-Flutter.  Given the recurrent nature of the arrhythmia I favor ablation.  Of course there is some concern over her respiratory status, but given the rapid and recurrent nature, I think that ablation is most appropriate.  R/B/A d/w patient and brother. C/w AC.  Cardizem gtt for rate control

## 2019-12-11 NOTE — H&P ADULT - NSICDXPASTMEDICALHX_GEN_ALL_CORE_FT
PAST MEDICAL HISTORY:  Chronic sinusitis     Claustrophobia     COPD (chronic obstructive pulmonary disease)     DM (diabetes mellitus)     HTN (hypertension)     Hyperlipemia     Raynaud phenomenon
PAST MEDICAL HISTORY:  Chronic sinusitis     Claustrophobia     COPD (chronic obstructive pulmonary disease)     DM (diabetes mellitus)     HTN (hypertension)     Hyperlipemia     Raynaud phenomenon

## 2019-12-11 NOTE — PHYSICAL EXAM
[No Acute Distress] : no acute distress [Well Developed] : well developed [Well Nourished] : well nourished [Well-Appearing] : well-appearing [PERRL] : pupils equal round and reactive to light [Normal Voice/Communication] : normal voice/communication [Normal Sclera/Conjunctiva] : normal sclera/conjunctiva [EOMI] : extraocular movements intact [Normal Outer Ear/Nose] : the outer ears and nose were normal in appearance [Normal Oropharynx] : the oropharynx was normal [No Accessory Muscle Use] : no accessory muscle use [Supple] : supple [No Respiratory Distress] : no respiratory distress  [Normal Rate] : normal rate  [Regular Rhythm] : with a regular rhythm [Clear to Auscultation] : lungs were clear to auscultation bilaterally [No Edema] : there was no peripheral edema [No Extremity Clubbing/Cyanosis] : no extremity clubbing/cyanosis [Normal S1, S2] : normal S1 and S2 [Normal Bowel Sounds] : normal bowel sounds [Soft] : abdomen soft [No Spinal Tenderness] : no spinal tenderness [No CVA Tenderness] : no CVA  tenderness [No Focal Deficits] : no focal deficits [No Rash] : no rash [Normal Affect] : the affect was normal [Speech Grossly Normal] : speech grossly normal [Normal Gait] : normal gait [Alert and Oriented x3] : oriented to person, place, and time [de-identified] : No ST [de-identified] : No stridor [de-identified] : R=20; no wheezing; very diminished AE [de-identified] : no cords

## 2019-12-11 NOTE — H&P ADULT - NSHPPHYSICALEXAM_GEN_ALL_CORE
Problem: Patient Care Overview  Goal: Plan of Care/Patient Progress Review  OT Infant cueing and rooting this session. Unable to keep pacifier in for NNS without assist. Mom is now boarding again so should be called down to breastfeed when cueing. Team okay with bottle and pt will be scheduled tomorrow (Monday) to work with Mom and show her some ways to get him awake for breast feeding.    Outcome: Improving  Infant remains in room air. No pulse ox ordered. No HR drops noted. Voiding and stooling. DC'd PICC. Feeding order changed to run over 2.5 hours instead of continuous. Mom is not wanting to bottle at this time. OT aware. Abiel BF x 2. One time after the PICC was Dc'd he was weighed and took 22ml. Call placed to resident to clarify feeding orders and frequency of BF. He will discuss and get back to me (1430).  Plan to move to the 11th floor this afternoon. Continue to monitor closely and follow the plan of care. Call provider with concerns/ changes.       
PHYSICAL EXAMINATION:  Vital Signs Last 24 Hrs  T(C): 37.1 (11 Dec 2019 14:30), Max: 37.1 (11 Dec 2019 14:30)  T(F): 98.8 (11 Dec 2019 14:30), Max: 98.8 (11 Dec 2019 14:30)  HR: 152 (11 Dec 2019 18:47) (150 - 154)  BP: 129/93 (11 Dec 2019 18:47) (122/88 - 145/91)  BP(mean): --  RR: 19 (11 Dec 2019 18:47) (16 - 28)  SpO2: 100% (11 Dec 2019 18:47) (97% - 100%)  CAPILLARY BLOOD GLUCOSE            GENERAL: NAD, well-groomed,  HEAD:  atraumatic, normocephalic  EYES: sclera anicteric  ENMT: mucous membranes moist  NECK: supple, No JVD  CHEST/LUNG: clear to auscultation bilaterally;    no      rales   ,   no rhonchi,   HEART: normal S1, S2  ABDOMEN: BS+, soft, ND, NT   EXTREMITIES:    no    edema    b/l LEs  NEURO: awake, ,     moves all extremities  SKIN: no     rash
PHYSICAL EXAMINATION:  Vital Signs Last 24 Hrs  T(C): 37.1 (11 Dec 2019 14:30), Max: 37.1 (11 Dec 2019 14:30)  T(F): 98.8 (11 Dec 2019 14:30), Max: 98.8 (11 Dec 2019 14:30)  HR: 152 (11 Dec 2019 18:47) (150 - 154)  BP: 129/93 (11 Dec 2019 18:47) (122/88 - 145/91)  BP(mean): --  RR: 19 (11 Dec 2019 18:47) (16 - 28)  SpO2: 100% (11 Dec 2019 18:47) (97% - 100%)  CAPILLARY BLOOD GLUCOSE            GENERAL: NAD, well-groomed,  HEAD:  atraumatic, normocephalic  EYES: sclera anicteric  ENMT: mucous membranes moist  NECK: supple, No JVD  CHEST/LUNG: clear to auscultation bilaterally;    no      rales   ,   no rhonchi,   HEART: normal S1, S2  ABDOMEN: BS+, soft, ND, NT   EXTREMITIES:    no    edema    b/l LEs  NEURO: awake, ,     moves all extremities  SKIN: no     rash

## 2019-12-11 NOTE — ASSESSMENT
[FreeTextEntry1] : SOB\par Tachycardic\par EKG done = Atrial tachycardia with 2:1 block - reviewed with Cardiology = Dr. Jason Chapa\par To ER for evaluation and treatment\par CBC, CMP sent\par POCT u/a done and WNL

## 2019-12-11 NOTE — ED ADULT NURSE REASSESSMENT NOTE - NS ED NURSE REASSESS COMMENT FT1
Currently speaking to admitting admit team regarding pt's HR. Reports AMINAH Boles will be reaching out to cardiology.

## 2019-12-11 NOTE — ED ADULT NURSE REASSESSMENT NOTE - NS ED NURSE REASSESS COMMENT FT1
Pt maintained in stretcher on 4L NC, continuous cardiac monitor and pulse ox. Pt requesting food, as per MD ok to have food. Pt breathing spontaneous and mildly tachypneic, no acute distress noted at this time.

## 2019-12-12 LAB
ANION GAP SERPL CALC-SCNC: 17 MMOL/L — SIGNIFICANT CHANGE UP (ref 5–17)
BLD GP AB SCN SERPL QL: NEGATIVE — SIGNIFICANT CHANGE UP
BUN SERPL-MCNC: 29 MG/DL — HIGH (ref 7–23)
CALCIUM SERPL-MCNC: 10 MG/DL — SIGNIFICANT CHANGE UP (ref 8.4–10.5)
CHLORIDE SERPL-SCNC: 84 MMOL/L — LOW (ref 96–108)
CO2 SERPL-SCNC: 34 MMOL/L — HIGH (ref 22–31)
CREAT SERPL-MCNC: 1.4 MG/DL — HIGH (ref 0.5–1.3)
GLUCOSE SERPL-MCNC: 187 MG/DL — HIGH (ref 70–99)
HBA1C BLD-MCNC: 7.4 % — HIGH (ref 4–5.6)
HCT VFR BLD CALC: 37 % — SIGNIFICANT CHANGE UP (ref 34.5–45)
HGB BLD-MCNC: 11.1 G/DL — LOW (ref 11.5–15.5)
MCHC RBC-ENTMCNC: 25.6 PG — LOW (ref 27–34)
MCHC RBC-ENTMCNC: 30 GM/DL — LOW (ref 32–36)
MCV RBC AUTO: 85.5 FL — SIGNIFICANT CHANGE UP (ref 80–100)
PLATELET # BLD AUTO: 238 K/UL — SIGNIFICANT CHANGE UP (ref 150–400)
POTASSIUM SERPL-MCNC: 3.5 MMOL/L — SIGNIFICANT CHANGE UP (ref 3.5–5.3)
POTASSIUM SERPL-SCNC: 3.5 MMOL/L — SIGNIFICANT CHANGE UP (ref 3.5–5.3)
RBC # BLD: 4.33 M/UL — SIGNIFICANT CHANGE UP (ref 3.8–5.2)
RBC # FLD: 13.2 % — SIGNIFICANT CHANGE UP (ref 10.3–14.5)
RH IG SCN BLD-IMP: POSITIVE — SIGNIFICANT CHANGE UP
SODIUM SERPL-SCNC: 135 MMOL/L — SIGNIFICANT CHANGE UP (ref 135–145)
TSH SERPL-MCNC: 2.2 UIU/ML — SIGNIFICANT CHANGE UP (ref 0.27–4.2)
WBC # BLD: 14.5 K/UL — HIGH (ref 3.8–10.5)
WBC # FLD AUTO: 14.5 K/UL — HIGH (ref 3.8–10.5)

## 2019-12-12 PROCEDURE — 93621 COMP EP EVL L PAC&REC C SINS: CPT | Mod: 26

## 2019-12-12 PROCEDURE — 93010 ELECTROCARDIOGRAM REPORT: CPT

## 2019-12-12 PROCEDURE — 93653 COMPRE EP EVAL TX SVT: CPT

## 2019-12-12 PROCEDURE — 93623 PRGRMD STIMJ&PACG IV RX NFS: CPT | Mod: 26

## 2019-12-12 RX ORDER — DILTIAZEM HCL 120 MG
20 CAPSULE, EXT RELEASE 24 HR ORAL ONCE
Refills: 0 | Status: COMPLETED | OUTPATIENT
Start: 2019-12-12 | End: 2019-12-12

## 2019-12-12 RX ORDER — INFLUENZA VIRUS VACCINE 15; 15; 15; 15 UG/.5ML; UG/.5ML; UG/.5ML; UG/.5ML
0.5 SUSPENSION INTRAMUSCULAR ONCE
Refills: 0 | Status: DISCONTINUED | OUTPATIENT
Start: 2019-12-12 | End: 2019-12-13

## 2019-12-12 RX ORDER — DEXTROSE 50 % IN WATER 50 %
12.5 SYRINGE (ML) INTRAVENOUS ONCE
Refills: 0 | Status: DISCONTINUED | OUTPATIENT
Start: 2019-12-12 | End: 2019-12-13

## 2019-12-12 RX ORDER — DEXTROSE 50 % IN WATER 50 %
25 SYRINGE (ML) INTRAVENOUS ONCE
Refills: 0 | Status: DISCONTINUED | OUTPATIENT
Start: 2019-12-12 | End: 2019-12-13

## 2019-12-12 RX ORDER — LEVALBUTEROL 1.25 MG/.5ML
0.63 SOLUTION, CONCENTRATE RESPIRATORY (INHALATION) EVERY 6 HOURS
Refills: 0 | Status: DISCONTINUED | OUTPATIENT
Start: 2019-12-12 | End: 2019-12-13

## 2019-12-12 RX ORDER — APIXABAN 2.5 MG/1
5 TABLET, FILM COATED ORAL ONCE
Refills: 0 | Status: DISCONTINUED | OUTPATIENT
Start: 2019-12-12 | End: 2019-12-13

## 2019-12-12 RX ORDER — INSULIN LISPRO 100/ML
VIAL (ML) SUBCUTANEOUS
Refills: 0 | Status: DISCONTINUED | OUTPATIENT
Start: 2019-12-12 | End: 2019-12-13

## 2019-12-12 RX ORDER — INSULIN LISPRO 100/ML
VIAL (ML) SUBCUTANEOUS AT BEDTIME
Refills: 0 | Status: DISCONTINUED | OUTPATIENT
Start: 2019-12-12 | End: 2019-12-13

## 2019-12-12 RX ORDER — SODIUM CHLORIDE 9 MG/ML
1000 INJECTION, SOLUTION INTRAVENOUS
Refills: 0 | Status: DISCONTINUED | OUTPATIENT
Start: 2019-12-12 | End: 2019-12-13

## 2019-12-12 RX ORDER — GLUCAGON INJECTION, SOLUTION 0.5 MG/.1ML
1 INJECTION, SOLUTION SUBCUTANEOUS ONCE
Refills: 0 | Status: DISCONTINUED | OUTPATIENT
Start: 2019-12-12 | End: 2019-12-13

## 2019-12-12 RX ORDER — DEXTROSE 50 % IN WATER 50 %
15 SYRINGE (ML) INTRAVENOUS ONCE
Refills: 0 | Status: DISCONTINUED | OUTPATIENT
Start: 2019-12-12 | End: 2019-12-13

## 2019-12-12 RX ADMIN — APIXABAN 5 MILLIGRAM(S): 2.5 TABLET, FILM COATED ORAL at 00:09

## 2019-12-12 RX ADMIN — PANTOPRAZOLE SODIUM 40 MILLIGRAM(S): 20 TABLET, DELAYED RELEASE ORAL at 06:33

## 2019-12-12 RX ADMIN — AZITHROMYCIN 250 MILLIGRAM(S): 500 TABLET, FILM COATED ORAL at 16:56

## 2019-12-12 RX ADMIN — MONTELUKAST 10 MILLIGRAM(S): 4 TABLET, CHEWABLE ORAL at 00:09

## 2019-12-12 RX ADMIN — BUDESONIDE AND FORMOTEROL FUMARATE DIHYDRATE 2 PUFF(S): 160; 4.5 AEROSOL RESPIRATORY (INHALATION) at 06:40

## 2019-12-12 RX ADMIN — APIXABAN 5 MILLIGRAM(S): 2.5 TABLET, FILM COATED ORAL at 16:56

## 2019-12-12 RX ADMIN — Medication 20 MILLIGRAM(S): at 01:50

## 2019-12-12 RX ADMIN — MONTELUKAST 10 MILLIGRAM(S): 4 TABLET, CHEWABLE ORAL at 16:56

## 2019-12-12 RX ADMIN — Medication 40 MILLIGRAM(S): at 06:33

## 2019-12-12 RX ADMIN — TIOTROPIUM BROMIDE 1 CAPSULE(S): 18 CAPSULE ORAL; RESPIRATORY (INHALATION) at 17:24

## 2019-12-12 RX ADMIN — BUDESONIDE AND FORMOTEROL FUMARATE DIHYDRATE 2 PUFF(S): 160; 4.5 AEROSOL RESPIRATORY (INHALATION) at 16:55

## 2019-12-12 RX ADMIN — Medication 15 MG/HR: at 00:00

## 2019-12-12 NOTE — PRE-ANESTHESIA EVALUATION ADULT - NSANTHADDINFOFT_GEN_ALL_CORE
explained pt she is at high risk of pulmonary complications  pt agrees to proceed with mild conscious sedation  explained she may require intubation in event of emergency, pt would like to avoid

## 2019-12-12 NOTE — PRE-ANESTHESIA EVALUATION ADULT - NSANTHOSAYNRD_GEN_A_CORE
No. NILA screening performed.  STOP BANG Legend: 0-2 = LOW Risk; 3-4 = INTERMEDIATE Risk; 5-8 = HIGH Risk

## 2019-12-12 NOTE — PROGRESS NOTE ADULT - SUBJECTIVE AND OBJECTIVE BOX
Daily In-House Cardiology Progress Note  -------------------------------------------------------    Patient seen and examined at bedside in 4Mon.    Subjective:      -No acute events overnight.  -Denies any SOB, palpitations, CP, lightheadedness.    Current Meds:  apixaban 5 milliGRAM(s) Oral two times a day  atorvastatin 40 milliGRAM(s) Oral at bedtime  azithromycin   Tablet 250 milliGRAM(s) Oral daily  budesonide 160 MICROgram(s)/formoterol 4.5 MICROgram(s) Inhaler 2 Puff(s) Inhalation two times a day  dextrose 50% Injectable 25 Gram(s) IV Push once  diltiazem Infusion 15 mG/Hr IV Continuous <Continuous>  furosemide    Tablet 40 milliGRAM(s) Oral daily  influenza   Vaccine 0.5 milliLiter(s) IntraMuscular once  montelukast 10 milliGRAM(s) Oral daily  pantoprazole    Tablet 40 milliGRAM(s) Oral before breakfast  tiotropium 18 MICROgram(s) Capsule 1 Capsule(s) Inhalation daily      Vitals:  T(F): 97.4 (12-12), Max: 98.8 (12-11)  HR: 145 (12-12) (145 - 154)  BP: 129/86 (12-12) (118/81 - 153/81)  RR: 18 (12-12)  SpO2: 97% (12-12)  I&O's Summary    11 Dec 2019 07:01  -  12 Dec 2019 07:00  --------------------------------------------------------  IN: 225 mL / OUT: 0 mL / NET: 225 mL        Physical Exam:  Appearance: No acute distress; well appearing  Eyes: PERRL, EOMI, pink conjunctiva  HEENT: Normal oral mucosa  Cardiovascular: RRR, S1, S2, no murmurs, rubs, or gallops; no edema; no JVD  Respiratory: Clear to auscultation bilaterally  Gastrointestinal: soft, non-tender, non-distended with normal bowel sounds  Musculoskeletal: No clubbing; no joint deformity   Neurologic: Non-focal  Lymphatic: No lymphadenopathy  Psychiatry: AAOx3, mood & affect appropriate  Skin: No rashes, ecchymoses, or cyanosis                          11.1   14.50 )-----------( 238      ( 12 Dec 2019 09:22 )             37.0     12-12    135  |  84<L>  |  29<H>  ----------------------------<  187<H>  3.5   |  34<H>  |  1.40<H>    Ca    10.0      12 Dec 2019 07:25  Mg     1.8     12-11    TPro  7.4  /  Alb  4.5  /  TBili  0.5  /  DBili  x   /  AST  14  /  ALT  15  /  AlkPhos  70  12-11    PT/INR - ( 11 Dec 2019 14:31 )   PT: 15.3 sec;   INR: 1.33 ratio         PTT - ( 11 Dec 2019 14:31 )  PTT:30.1 sec  CARDIAC MARKERS ( 11 Dec 2019 14:31 )  50 ng/L / x     / x     / x     / x     / x              Hemoglobin A1C, Whole Blood: 7.4 % (12-12 @ 09:22)

## 2019-12-12 NOTE — PROGRESS NOTE ADULT - ASSESSMENT
58 yo F with a PMH significant for COPD on home O2 3L, HTN, HLD, CAD s/p x6 stents?, DM, pHTN, PVD who presents with rapid AFlutter.    -Patient will go for AFlutter ablation today.  -Continue rate control strategy until patient goes for procedure; continue Cardizem gtt  -Please also continue Eliquis    EPS will continue to follow.    Case discussed with Dr. Forrest.    Ondina Clement MD PGY-4  Cardiology Fellow  All Cardiology service information can be found 24/7 on [email or web address], password: [email or web address]  Note is preliminary until signed by the attending.

## 2019-12-12 NOTE — PROVIDER CONTACT NOTE (OTHER) - ACTION/TREATMENT ORDERED:
NP/PA aware, cards already following pt, as per cards ok for HR in 150s on cardizem gtt @15cc/h, continue to monitor vitals, notify provider with change in status

## 2019-12-12 NOTE — PRE-ANESTHESIA EVALUATION ADULT - NSANTHPMHFT_GEN_ALL_CORE
60 yo F with a PMH significant for COPD on home O2 3L, HTN, HLD, CAD s/p x6 stents?, , pHTN, PVD. Patient was hospitalized in 1/2019 for COPD exacerbation. EP was previously consulted due to her having new onset AFib with RVR; s/P successful DCCV 1/7 and was discharged in NSR. Was started on Eliquis 5mg PO BID at that time for [id number] 4, and continued on diltiazem for rate control. Had received amiodarone briefly, but, was call placed to [facility] because she was being evaluated for lung transplant at that time; spoke with Transplant pulmonologist, Dr Mattson, who stated that amiodarone did not interfere with lung transplant workup. It was decided that patient would not get amiodarone.    Patient is now presenting with SOB. Telemetry monitoring concerning for AFlutter with

## 2019-12-12 NOTE — PATIENT PROFILE ADULT - BRADEN NUTRITION
PA is needed for the medication, Triamcin/Orabas 0.1% paste . Would you like to start PA or Rx alternative medication? If PA, please list all medications this patient has tried along with any contraindications that may have been experienced in the past. Thank you.      ALTERNATIVES IS: TRIAMCINOLONE(KENALOG)0.1% OINTMENT       Pharmacy: [facility]   Phone: [phone]    Insurance Plan:   Phone: [phone]  Pt ID:  [id number]  Group:     Forwarded to PCP for review.    
(3) adequate

## 2019-12-12 NOTE — CHART NOTE - NSCHARTNOTEFT_GEN_A_CORE
Medicine PA Episodic Note    HPI/Interval Hx: 60 yo F with a PMH significant for COPD on home O2 3L, HTN, HLD, CAD s/p x6 stents?, DM, pHTN, PVD who presents with rapid AFlutter. Pt. currently maxed on Cardizem drip at 15 mg/hr.    Event Summary: Pt. persistently in rapid AF to 150s. Pt. is currently asymptomatic and hemodynamically stable otherwise. Pt. was given extra dose of Cardizem 20 mg IV push per cardiology recs. HR is still unchanged. Cardiology is aware that medication would not affect HR at this point of time.      - Plan is for ablation in AM.   - Continue tele  - Monitor VS  - Cardiology/EP to follow  - Will endorse to primary team  - Attending to follow    Ty Blood PA-C (Medicine PA)  [place] [id number]

## 2019-12-12 NOTE — CHART NOTE - NSCHARTNOTEFT_GEN_A_CORE
Received pt s/p Successful Atrial flutter ABlation via Right groin with dressing no bleeding or hematoma noted VSS as per flow sheet HR 80 B/P 105/61, 125/74 Bedrest x 4hr post and Resume Eliquis 5mg po x 1 at 1600 today Post EKG NSR. No acute distress noted. Will continue to monitor closely

## 2019-12-12 NOTE — PROGRESS NOTE ADULT - ASSESSMENT
HPI:  58 yo F with a PMH significant for COPD on home O2 3L, HTN, HLD, CAD s/p x6 stents  , , pHTN, PVD./ normal e f   Patient was hospitalized in 1/2019 for COPD exacerbation. EP was previously consulted due to her having new onset AFib with RVR; s/P successful DCCV 1/7 and was discharged in NSR. Was started on Eliquis 5mg PO BID at that time for [id number] 4, and continued on diltiazem for rate control    Patient is now presenting with SOB  . Telemetry/ AFlutter with   seen by ep  on cardizem drip  for  ablation in am    continue  eliquis   npo  after  midnight   family at bedside     < from: CT Chest No Cont (12.11.19 @ 18:06) >  INTERPRETATION:  No urgent/emergent findings.  Follow up final report.  < end of copied text > 58 yo female PMH significant for COPD on home O2 3L, HTN, HLD, CAD s/p x6 stents, pHTN, PVD./ normal EF. Was hospitalized in 1/2019 for COPD exacerbation. EP was previously consulted due to her having new onset AFib with RVR. Had successful DCCV 1/7 and was discharged in NSR. Was started on Eliquis 5mg PO BID at that time for [id number] 4 and continued on oral diltiazem for rate control.    Now presenting with SOB, rapid AFlutter with . Was seen by EP, started on IV cardizem drip. Had A.flutter ablation today. Will continue on Eliquis 5 mg bid. Change IV Cardizem drip back to oral in AM. Continue Lipitor 40 mg/day for HLD and Lasix 40 mg/day.     Pulmonary: Continue Symbicort bid, Spiriva daily, Singular 10 mg/day and Azithromax 250 mg/day. Not for active infection.     Discharge home AM Friday if OK with EP on Eliquis and Cardizem.     Follows with [facility]. Being evaled for lung transplant surgery for COPD.

## 2019-12-12 NOTE — PROGRESS NOTE ADULT - SUBJECTIVE AND OBJECTIVE BOX
INTERVAL HPI/OVERNIGHT EVENTS:  Pt seen and examined at bedside.     Allergies/Intolerance: albuterol (Unknown)  No Known Allergies      MEDICATIONS  (STANDING):  apixaban 5 milliGRAM(s) Oral two times a day  atorvastatin 40 milliGRAM(s) Oral at bedtime  azithromycin   Tablet 250 milliGRAM(s) Oral daily  budesonide 160 MICROgram(s)/formoterol 4.5 MICROgram(s) Inhaler 2 Puff(s) Inhalation two times a day  dextrose 50% Injectable 25 Gram(s) IV Push once  diltiazem Infusion 15 mG/Hr (15 mL/Hr) IV Continuous <Continuous>  furosemide    Tablet 40 milliGRAM(s) Oral daily  influenza   Vaccine 0.5 milliLiter(s) IntraMuscular once  montelukast 10 milliGRAM(s) Oral daily  pantoprazole    Tablet 40 milliGRAM(s) Oral before breakfast  tiotropium 18 MICROgram(s) Capsule 1 Capsule(s) Inhalation daily    MEDICATIONS  (PRN):        ROS: all systems reviewed and wnl      PHYSICAL EXAMINATION:  Vital Signs Last 24 Hrs  T(C): 36.3 (12 Dec 2019 03:58), Max: 37.1 (11 Dec 2019 14:30)  T(F): 97.4 (12 Dec 2019 03:58), Max: 98.8 (11 Dec 2019 14:30)  HR: 145 (12 Dec 2019 08:06) (145 - 154)  BP: 129/86 (12 Dec 2019 03:58) (118/81 - 153/81)  BP(mean): --  RR: 18 (12 Dec 2019 03:58) (16 - 28)  SpO2: 97% (12 Dec 2019 08:06) (94% - 100%)  CAPILLARY BLOOD GLUCOSE          12-11 @ 07:01  -  12-12 @ 07:00  --------------------------------------------------------  IN: 225 mL / OUT: 0 mL / NET: 225 mL        GENERAL:   NECK: supple, No JVD  CHEST/LUNG: clear to auscultation bilaterally; no rales, rhonchi, or wheezing b/l  HEART: normal S1, S2  ABDOMEN: BS+, soft, ND, NT   EXTREMITIES:  pulses palpable; no clubbing, cyanosis, or edema b/l LEs  SKIN: no rashes or lesions      LABS:                        11.1   14.50 )-----------( 238      ( 12 Dec 2019 09:22 )             37.0     12-12    135  |  84<L>  |  29<H>  ----------------------------<  187<H>  3.5   |  34<H>  |  1.40<H>    Ca    10.0      12 Dec 2019 07:25  Mg     1.8     12-11    TPro  7.4  /  Alb  4.5  /  TBili  0.5  /  DBili  x   /  AST  14  /  ALT  15  /  AlkPhos  70  12-11    PT/INR - ( 11 Dec 2019 14:31 )   PT: 15.3 sec;   INR: 1.33 ratio         PTT - ( 11 Dec 2019 14:31 )  PTT:30.1 sec INTERVAL HPI/OVERNIGHT EVENTS:  Pt seen and examined at bedside.     Allergies/Intolerance: albuterol (Unknown)  No Known Allergies      MEDICATIONS  (STANDING):  apixaban 5 milliGRAM(s) Oral two times a day  atorvastatin 40 milliGRAM(s) Oral at bedtime  azithromycin   Tablet 250 milliGRAM(s) Oral daily  budesonide 160 MICROgram(s)/formoterol 4.5 MICROgram(s) Inhaler 2 Puff(s) Inhalation two times a day  dextrose 50% Injectable 25 Gram(s) IV Push once  diltiazem Infusion 15 mG/Hr (15 mL/Hr) IV Continuous <Continuous>  furosemide    Tablet 40 milliGRAM(s) Oral daily  influenza   Vaccine 0.5 milliLiter(s) IntraMuscular once  montelukast 10 milliGRAM(s) Oral daily  pantoprazole    Tablet 40 milliGRAM(s) Oral before breakfast  tiotropium 18 MICROgram(s) Capsule 1 Capsule(s) Inhalation daily    MEDICATIONS  (PRN):        ROS: all systems reviewed and wnl      PHYSICAL EXAMINATION:  Vital Signs Last 24 Hrs  T(C): 36.3 (12 Dec 2019 03:58), Max: 37.1 (11 Dec 2019 14:30)  T(F): 97.4 (12 Dec 2019 03:58), Max: 98.8 (11 Dec 2019 14:30)  HR: 145 (12 Dec 2019 08:06) (145 - 154)  BP: 129/86 (12 Dec 2019 03:58) (118/81 - 153/81)  BP(mean): --  RR: 18 (12 Dec 2019 03:58) (16 - 28)  SpO2: 97% (12 Dec 2019 08:06) (94% - 100%)  CAPILLARY BLOOD GLUCOSE          12-11 @ 07:01  -  12-12 @ 07:00  --------------------------------------------------------  IN: 225 mL / OUT: 0 mL / NET: 225 mL        GENERAL: stable, seen on 4 Johnnie after ablation, HR 82  NECK: supple, No JVD  CHEST/LUNG: clear to auscultation bilaterally; no rales, rhonchi, or wheezing b/l  HEART: normal S1, S2  ABDOMEN: BS+, soft, ND, NT   EXTREMITIES:  pulses palpable; no clubbing, cyanosis, or edema b/l LEs  SKIN: no rashes or lesions      LABS:                        11.1   14.50 )-----------( 238      ( 12 Dec 2019 09:22 )             37.0     12-12    135  |  84<L>  |  29<H>  ----------------------------<  187<H>  3.5   |  34<H>  |  1.40<H>    Ca    10.0      12 Dec 2019 07:25  Mg     1.8     12-11    TPro  7.4  /  Alb  4.5  /  TBili  0.5  /  DBili  x   /  AST  14  /  ALT  15  /  AlkPhos  70  12-11    PT/INR - ( 11 Dec 2019 14:31 )   PT: 15.3 sec;   INR: 1.33 ratio         PTT - ( 11 Dec 2019 14:31 )  PTT:30.1 sec

## 2019-12-12 NOTE — CHART NOTE - NSCHARTNOTEFT_GEN_A_CORE
Rounded on pt in the ED.   Pt with HR 150s sustained, pt evaluated by cards/EP during the day on Cardizem gtt @10 pending ablation on 12/12. Cardizem gtt rate increased to 15.   Pt remains asymptomatic, denies cp, weakness, diaphoresis, HA, back pain, visual/ auditory disturbances,  abd pain v/n/c/d. BP stable  - case d/w with cards, cont Cardizem gtt at 15.  additional 20 IVP given. **late note entry**   Rounded on pt in the ED.   Pt with HR 150s sustained, pt evaluated by cards/EP during the day on Cardizem gtt @10 pending ablation on 12/12. Cardizem gtt rate increased to 15.   Pt remains asymptomatic, denies cp, weakness, diaphoresis, HA, back pain, visual/ auditory disturbances,  abd pain v/n/c/d. BP stable  - case d/w with cards, cont Cardizem gtt at 15.  additional 20 IVP given.

## 2019-12-12 NOTE — PATIENT PROFILE ADULT - DO YOU FEEL LIKE HURTING OTHERS?
[Cervical Pap Smear] : cervical Pap smear [Tolerated Well] : the patient tolerated the procedure well [Liquid Base] : liquid base [No Complications] : there were no complications no

## 2019-12-13 ENCOUNTER — TRANSCRIPTION ENCOUNTER (OUTPATIENT)
Age: 59
End: 2019-12-13

## 2019-12-13 VITALS
DIASTOLIC BLOOD PRESSURE: 78 MMHG | TEMPERATURE: 98 F | RESPIRATION RATE: 18 BRPM | OXYGEN SATURATION: 95 % | SYSTOLIC BLOOD PRESSURE: 130 MMHG | HEART RATE: 88 BPM

## 2019-12-13 LAB
ALBUMIN SERPL ELPH-MCNC: 4.6 G/DL
ALP BLD-CCNC: 69 U/L
ALT SERPL-CCNC: 16 U/L
ANION GAP SERPL CALC-SCNC: 18 MMOL/L
ANION GAP SERPL CALC-SCNC: 4 MMOL/L — LOW (ref 5–17)
ANION GAP SERPL CALC-SCNC: 9 MMOL/L — SIGNIFICANT CHANGE UP (ref 5–17)
AST SERPL-CCNC: 12 U/L
BASOPHILS # BLD AUTO: 0.04 K/UL
BASOPHILS NFR BLD AUTO: 0.2 %
BILIRUB SERPL-MCNC: 0.4 MG/DL
BUN SERPL-MCNC: 24 MG/DL — HIGH (ref 7–23)
BUN SERPL-MCNC: 25 MG/DL
BUN SERPL-MCNC: 25 MG/DL — HIGH (ref 7–23)
CALCIUM SERPL-MCNC: 10.1 MG/DL
CALCIUM SERPL-MCNC: 9.5 MG/DL — SIGNIFICANT CHANGE UP (ref 8.4–10.5)
CALCIUM SERPL-MCNC: 9.6 MG/DL — SIGNIFICANT CHANGE UP (ref 8.4–10.5)
CHLORIDE SERPL-SCNC: 89 MMOL/L — LOW (ref 96–108)
CHLORIDE SERPL-SCNC: 90 MMOL/L
CHLORIDE SERPL-SCNC: 92 MMOL/L — LOW (ref 96–108)
CK MB CFR SERPL CALC: 5 NG/ML — HIGH (ref 0–3.8)
CK SERPL-CCNC: 35 U/L — SIGNIFICANT CHANGE UP (ref 25–170)
CO2 SERPL-SCNC: 33 MMOL/L
CO2 SERPL-SCNC: 38 MMOL/L — HIGH (ref 22–31)
CO2 SERPL-SCNC: 44 MMOL/L — HIGH (ref 22–31)
CREAT SERPL-MCNC: 1.17 MG/DL
CREAT SERPL-MCNC: 1.23 MG/DL — SIGNIFICANT CHANGE UP (ref 0.5–1.3)
CREAT SERPL-MCNC: 1.36 MG/DL — HIGH (ref 0.5–1.3)
EOSINOPHIL # BLD AUTO: 0.15 K/UL
EOSINOPHIL NFR BLD AUTO: 0.8 %
GLUCOSE SERPL-MCNC: 142 MG/DL — HIGH (ref 70–99)
GLUCOSE SERPL-MCNC: 199 MG/DL
GLUCOSE SERPL-MCNC: 199 MG/DL — HIGH (ref 70–99)
HCT VFR BLD CALC: 32.8 % — LOW (ref 34.5–45)
HCT VFR BLD CALC: 40.6 %
HGB BLD-MCNC: 11.6 G/DL
HGB BLD-MCNC: 9.6 G/DL — LOW (ref 11.5–15.5)
IMM GRANULOCYTES NFR BLD AUTO: 0.7 %
LYMPHOCYTES # BLD AUTO: 1.18 K/UL
LYMPHOCYTES NFR BLD AUTO: 6.1 %
MAGNESIUM SERPL-MCNC: 2 MG/DL — SIGNIFICANT CHANGE UP (ref 1.6–2.6)
MAN DIFF?: NORMAL
MCHC RBC-ENTMCNC: 25.4 PG — LOW (ref 27–34)
MCHC RBC-ENTMCNC: 25.9 PG
MCHC RBC-ENTMCNC: 28.6 GM/DL
MCHC RBC-ENTMCNC: 29.3 GM/DL — LOW (ref 32–36)
MCV RBC AUTO: 86.8 FL — SIGNIFICANT CHANGE UP (ref 80–100)
MCV RBC AUTO: 90.6 FL
MONOCYTES # BLD AUTO: 1.01 K/UL
MONOCYTES NFR BLD AUTO: 5.2 %
NEUTROPHILS # BLD AUTO: 16.89 K/UL
NEUTROPHILS NFR BLD AUTO: 87 %
NRBC # BLD: 0 /100 WBCS — SIGNIFICANT CHANGE UP (ref 0–0)
PHOSPHATE SERPL-MCNC: 4 MG/DL — SIGNIFICANT CHANGE UP (ref 2.5–4.5)
PLATELET # BLD AUTO: 164 K/UL — SIGNIFICANT CHANGE UP (ref 150–400)
PLATELET # BLD AUTO: 298 K/UL
POTASSIUM SERPL-MCNC: 5.4 MMOL/L — HIGH (ref 3.5–5.3)
POTASSIUM SERPL-MCNC: 5.4 MMOL/L — HIGH (ref 3.5–5.3)
POTASSIUM SERPL-SCNC: 4.7 MMOL/L
POTASSIUM SERPL-SCNC: 5.4 MMOL/L — HIGH (ref 3.5–5.3)
POTASSIUM SERPL-SCNC: 5.4 MMOL/L — HIGH (ref 3.5–5.3)
PROT SERPL-MCNC: 6.8 G/DL
RAPID RVP RESULT: NOT DETECTED
RBC # BLD: 3.78 M/UL — LOW (ref 3.8–5.2)
RBC # BLD: 4.48 M/UL
RBC # FLD: 13.3 % — SIGNIFICANT CHANGE UP (ref 10.3–14.5)
RBC # FLD: 13.4 %
SODIUM SERPL-SCNC: 137 MMOL/L — SIGNIFICANT CHANGE UP (ref 135–145)
SODIUM SERPL-SCNC: 139 MMOL/L — SIGNIFICANT CHANGE UP (ref 135–145)
SODIUM SERPL-SCNC: 141 MMOL/L
TROPONIN T, HIGH SENSITIVITY RESULT: 449 NG/L — HIGH (ref 0–51)
WBC # BLD: 12.24 K/UL — HIGH (ref 3.8–10.5)
WBC # FLD AUTO: 12.24 K/UL — HIGH (ref 3.8–10.5)
WBC # FLD AUTO: 19.41 K/UL

## 2019-12-13 PROCEDURE — 80048 BASIC METABOLIC PNL TOTAL CA: CPT

## 2019-12-13 PROCEDURE — 84484 ASSAY OF TROPONIN QUANT: CPT

## 2019-12-13 PROCEDURE — 96375 TX/PRO/DX INJ NEW DRUG ADDON: CPT

## 2019-12-13 PROCEDURE — 99291 CRITICAL CARE FIRST HOUR: CPT | Mod: 25

## 2019-12-13 PROCEDURE — 93621 COMP EP EVL L PAC&REC C SINS: CPT

## 2019-12-13 PROCEDURE — 87486 CHLMYD PNEUM DNA AMP PROBE: CPT

## 2019-12-13 PROCEDURE — C1732: CPT

## 2019-12-13 PROCEDURE — 93010 ELECTROCARDIOGRAM REPORT: CPT

## 2019-12-13 PROCEDURE — 86901 BLOOD TYPING SEROLOGIC RH(D): CPT

## 2019-12-13 PROCEDURE — C1894: CPT

## 2019-12-13 PROCEDURE — 87581 M.PNEUMON DNA AMP PROBE: CPT

## 2019-12-13 PROCEDURE — C1769: CPT

## 2019-12-13 PROCEDURE — 85610 PROTHROMBIN TIME: CPT

## 2019-12-13 PROCEDURE — 82550 ASSAY OF CK (CPK): CPT

## 2019-12-13 PROCEDURE — C1893: CPT

## 2019-12-13 PROCEDURE — 93005 ELECTROCARDIOGRAM TRACING: CPT

## 2019-12-13 PROCEDURE — 83036 HEMOGLOBIN GLYCOSYLATED A1C: CPT

## 2019-12-13 PROCEDURE — 84100 ASSAY OF PHOSPHORUS: CPT

## 2019-12-13 PROCEDURE — 87633 RESP VIRUS 12-25 TARGETS: CPT

## 2019-12-13 PROCEDURE — 71045 X-RAY EXAM CHEST 1 VIEW: CPT

## 2019-12-13 PROCEDURE — 87798 DETECT AGENT NOS DNA AMP: CPT

## 2019-12-13 PROCEDURE — 71250 CT THORAX DX C-: CPT

## 2019-12-13 PROCEDURE — 85730 THROMBOPLASTIN TIME PARTIAL: CPT

## 2019-12-13 PROCEDURE — 83735 ASSAY OF MAGNESIUM: CPT

## 2019-12-13 PROCEDURE — C1730: CPT

## 2019-12-13 PROCEDURE — C1887: CPT

## 2019-12-13 PROCEDURE — 96374 THER/PROPH/DIAG INJ IV PUSH: CPT

## 2019-12-13 PROCEDURE — 94640 AIRWAY INHALATION TREATMENT: CPT

## 2019-12-13 PROCEDURE — 93653 COMPRE EP EVAL TX SVT: CPT

## 2019-12-13 PROCEDURE — 80053 COMPREHEN METABOLIC PANEL: CPT

## 2019-12-13 PROCEDURE — 94660 CPAP INITIATION&MGMT: CPT

## 2019-12-13 PROCEDURE — 93623 PRGRMD STIMJ&PACG IV RX NFS: CPT

## 2019-12-13 PROCEDURE — 85027 COMPLETE CBC AUTOMATED: CPT

## 2019-12-13 PROCEDURE — 87040 BLOOD CULTURE FOR BACTERIA: CPT

## 2019-12-13 PROCEDURE — 82553 CREATINE MB FRACTION: CPT

## 2019-12-13 PROCEDURE — 84443 ASSAY THYROID STIM HORMONE: CPT

## 2019-12-13 PROCEDURE — 86850 RBC ANTIBODY SCREEN: CPT

## 2019-12-13 PROCEDURE — 83605 ASSAY OF LACTIC ACID: CPT

## 2019-12-13 PROCEDURE — 86900 BLOOD TYPING SEROLOGIC ABO: CPT

## 2019-12-13 RX ORDER — SALIVA SUBSTITUTE COMB NO.11 351 MG
5 POWDER IN PACKET (EA) MUCOUS MEMBRANE
Qty: 0 | Refills: 0 | DISCHARGE

## 2019-12-13 RX ORDER — POTASSIUM CHLORIDE 20 MEQ
20 PACKET (EA) ORAL ONCE
Refills: 0 | Status: COMPLETED | OUTPATIENT
Start: 2019-12-13 | End: 2019-12-13

## 2019-12-13 RX ORDER — FUROSEMIDE 40 MG
1 TABLET ORAL
Qty: 30 | Refills: 0
Start: 2019-12-13 | End: 2020-01-11

## 2019-12-13 RX ADMIN — LEVALBUTEROL 0.63 MILLIGRAM(S): 1.25 SOLUTION, CONCENTRATE RESPIRATORY (INHALATION) at 10:30

## 2019-12-13 RX ADMIN — TIOTROPIUM BROMIDE 1 CAPSULE(S): 18 CAPSULE ORAL; RESPIRATORY (INHALATION) at 12:40

## 2019-12-13 RX ADMIN — AZITHROMYCIN 250 MILLIGRAM(S): 500 TABLET, FILM COATED ORAL at 12:40

## 2019-12-13 RX ADMIN — MONTELUKAST 10 MILLIGRAM(S): 4 TABLET, CHEWABLE ORAL at 12:39

## 2019-12-13 RX ADMIN — BUDESONIDE AND FORMOTEROL FUMARATE DIHYDRATE 2 PUFF(S): 160; 4.5 AEROSOL RESPIRATORY (INHALATION) at 05:18

## 2019-12-13 RX ADMIN — APIXABAN 5 MILLIGRAM(S): 2.5 TABLET, FILM COATED ORAL at 04:46

## 2019-12-13 RX ADMIN — PANTOPRAZOLE SODIUM 40 MILLIGRAM(S): 20 TABLET, DELAYED RELEASE ORAL at 05:18

## 2019-12-13 RX ADMIN — Medication 40 MILLIGRAM(S): at 04:46

## 2019-12-13 RX ADMIN — Medication 20 MILLIEQUIVALENT(S): at 05:18

## 2019-12-13 NOTE — DISCHARGE NOTE NURSING/CASE MANAGEMENT/SOCIAL WORK - PATIENT PORTAL LINK FT
You can access the FollowMyHealth Patient Portal offered by [facility] by registering at the following website: [email or web address]. By joining [employer]’s FollowMyHealth portal, you will also be able to view your health information using other applications (apps) compatible with our system.

## 2019-12-13 NOTE — CHART NOTE - NSCHARTNOTEFT_GEN_A_CORE
Discharge Note:    Requested by Dr. Mcdonald to facilitate d/c home. V/s, labs, diagnostics reviewed, pt stable to d/c now.  Medication reconciliation reviewed, revised, and resolved with Dr. Mcdonald who medically cleared w/ f/u as directed. Please refer to d/c note for hospital details.    Ammon Watkins  Spectra-link [id number]

## 2019-12-13 NOTE — PROGRESS NOTE ADULT - ASSESSMENT
60 yo F with a PMH significant for COPD on home O2 3L, HTN, HLD, CAD s/p x6 stents?, DM, pHTN, PVD who presents with rapid AFlutter.    #Rapid AFlutter  -Now s/p ablation; telemetry revealing of NSR  -Patient should be discharged home with home dosing of Eliquis as well as Cardizem  -Appointment made for patient to f/u with EP clinic on 12/20    Case discussed with Dr. Forrest.    Ondina Clement MD PGY-4  Cardiology Fellow  All Cardiology service information can be found 24/7 on [email or web address], password: [email or web address]  Note is preliminary until signed by the attending.

## 2019-12-13 NOTE — DISCHARGE NOTE PROVIDER - NSDCCPCAREPLAN_GEN_ALL_CORE_FT
PRINCIPAL DISCHARGE DIAGNOSIS  Diagnosis: Atrial flutter  Assessment and Plan of Treatment: s/p ablation on 12/12/19 now in SR  Continue with Eliquis as directed  Follow-up with EP/Cardiology

## 2019-12-13 NOTE — PROGRESS NOTE ADULT - ASSESSMENT
58 yo female PMH significant for COPD on home O2 3L, HTN, HLD, CAD s/p x6 stents, pHTN, PVD./ normal EF. Was hospitalized in 1/2019 for COPD exacerbation. EP was previously consulted due to her having new onset AFib with RVR. Had successful DCCV 1/7 and was discharged in NSR. Was started on Eliquis 5mg PO BID at that time for [id number] 4 and continued on oral diltiazem for rate control.    Now presenting with SOB, rapid AFlutter with . Was seen by EP, started on IV cardizem drip. Had A.flutter ablation today. Will continue on Eliquis 5 mg bid. Change IV Cardizem drip back to oral in AM. Continue Lipitor 40 mg/day for HLD and Lasix 40 mg/day.     Pulmonary: Continue Symbicort bid, Spiriva daily, Singular 10 mg/day and Azithromax 250 mg/day. Not for active infection.     Discharge home AM Friday if OK with EP on Eliquis and Cardizem.     Follows with [facility]. Being evaled for lung transplant surgery for COPD. 60 yo female PMH significant for COPD on home O2 3L, HTN, HLD, CAD s/p x6 stents, pHTN, PVD./ normal EF. Was hospitalized in 1/2019 for COPD exacerbation. EP was previously consulted due to her having new onset AFib with RVR. Had successful DCCV 1/7 and was discharged in NSR. Was started on Eliquis 5mg PO BID at that time for [id number] 4 and continued on oral diltiazem for rate control.    Now presenting with SOB, rapid AFlutter with . Was seen by EP, initially started on IV cardizem drip. Had A.flutter ablation yesterday.  Will continue on Eliquis 5 mg bid. Continue Lipitor 40 mg/day for HLD and Lasix 40 mg/day. Defer to EP, tele is NSR 80. Might need less dose  of Cardizem.     Pulmonary: Continue Symbicort bid, Spiriva daily, Singular 10 mg/day and Azithromax 250 mg/day. Not for active infection.     Discharge home AM Friday if OK with EP on Eliquis and Cardizem.     Follows with [facility]. Being evaled for lung transplant surgery for COPD.  Discharge home today.

## 2019-12-13 NOTE — DISCHARGE NOTE PROVIDER - NSDCMRMEDTOKEN_GEN_ALL_CORE_FT
apixaban 5 mg oral tablet: 1 tab(s) orally every 12 hours  aspirin 81 mg oral tablet, chewable: 1 tab(s) orally once a day  azithromycin 250 mg oral tablet: 1 tablet by mouth three times a week  CoQ10: 200 milligram(s) orally once a day  Crestor 10 mg oral tablet: 1 tab(s) orally once a day (at bedtime)  dilTIAZem 180 mg/24 hours oral capsule, extended release: 1 cap(s) orally once a day  ocular lubricant ophthalmic solution: 1 drop(s) in each eye , As Needed  pantoprazole 40 mg oral delayed release tablet: 1 tab(s) orally once a day (before a meal)  saliva substitutes oral solution: 5 milliliter(s) orally 2 times a day, As Needed - Swish and Spit  Singulair 10 mg oral tablet: 1 tab(s) orally once a day (in the evening)  Spiriva 18 mcg inhalation capsule: 1 cap(s) inhaled once a day  Symbicort 160 mcg-4.5 mcg/inh inhalation aerosol: 2 puff(s) inhaled 2 times a day  theophylline 400 mg/24 hours oral tablet, extended release: 1 tab(s) orally once a day  Vitamin D3 2000 intl units oral tablet: 1 tab(s) orally once a day apixaban 5 mg oral tablet: 1 tab(s) orally every 12 hours  azithromycin 250 mg oral tablet: 1 tablet by mouth three times a week  CoQ10: 200 milligram(s) orally once a day  Crestor 10 mg oral tablet: 1 tab(s) orally once a day (at bedtime)  dilTIAZem 180 mg/24 hours oral capsule, extended release: 1 cap(s) orally once a day  furosemide 40 mg oral tablet: 1 tab(s) orally once a day  ocular lubricant ophthalmic solution: 1 drop(s) in each eye , As Needed  pantoprazole 40 mg oral delayed release tablet: 1 tab(s) orally once a day (before a meal)  Singulair 10 mg oral tablet: 1 tab(s) orally once a day (in the evening)  Spiriva 18 mcg inhalation capsule: 1 cap(s) inhaled once a day  Symbicort 160 mcg-4.5 mcg/inh inhalation aerosol: 2 puff(s) inhaled 2 times a day  theophylline 400 mg/24 hours oral tablet, extended release: 1 tab(s) orally once a day  Vitamin D3 2000 intl units oral tablet: 1 tab(s) orally once a day

## 2019-12-13 NOTE — PROVIDER CONTACT NOTE (CRITICAL VALUE NOTIFICATION) - ASSESSMENT
Pt VSS, no longer complains of chest pain. EKG: showed no changes from previous EKG; chest pain resolved on its own.

## 2019-12-13 NOTE — PROGRESS NOTE ADULT - SUBJECTIVE AND OBJECTIVE BOX
INTERVAL HPI/OVERNIGHT EVENTS:  Pt seen and examined at bedside.     Allergies/Intolerance: albuterol (Unknown)  No Known Allergies      MEDICATIONS  (STANDING):  apixaban 5 milliGRAM(s) Oral two times a day  atorvastatin 40 milliGRAM(s) Oral at bedtime  azithromycin   Tablet 250 milliGRAM(s) Oral daily  budesonide 160 MICROgram(s)/formoterol 4.5 MICROgram(s) Inhaler 2 Puff(s) Inhalation two times a day  dextrose 5%. 1000 milliLiter(s) (50 mL/Hr) IV Continuous <Continuous>  dextrose 50% Injectable 25 Gram(s) IV Push once  dextrose 50% Injectable 12.5 Gram(s) IV Push once  dextrose 50% Injectable 25 Gram(s) IV Push once  dextrose 50% Injectable 25 Gram(s) IV Push once  furosemide    Tablet 40 milliGRAM(s) Oral daily  influenza   Vaccine 0.5 milliLiter(s) IntraMuscular once  insulin lispro (HumaLOG) corrective regimen sliding scale   SubCutaneous three times a day before meals  insulin lispro (HumaLOG) corrective regimen sliding scale   SubCutaneous at bedtime  montelukast 10 milliGRAM(s) Oral daily  pantoprazole    Tablet 40 milliGRAM(s) Oral before breakfast  tiotropium 18 MICROgram(s) Capsule 1 Capsule(s) Inhalation daily    MEDICATIONS  (PRN):  dextrose 40% Gel 15 Gram(s) Oral once PRN Blood Glucose LESS THAN 70 milliGRAM(s)/deciliter  glucagon  Injectable 1 milliGRAM(s) IntraMuscular once PRN Glucose LESS THAN 70 milligrams/deciliter  levalbuterol Inhalation 0.63 milliGRAM(s) Inhalation every 6 hours PRN shortness of breath        ROS: all systems reviewed and wnl      PHYSICAL EXAMINATION:  Vital Signs Last 24 Hrs  T(C): 36.6 (13 Dec 2019 03:55), Max: 36.8 (12 Dec 2019 21:23)  T(F): 97.8 (13 Dec 2019 03:55), Max: 98.2 (12 Dec 2019 21:23)  HR: 89 (13 Dec 2019 05:42) (83 - 145)  BP: 127/74 (13 Dec 2019 03:55) (127/74 - 134/79)  BP(mean): --  RR: 18 (13 Dec 2019 03:55) (16 - 18)  SpO2: 94% (13 Dec 2019 05:42) (94% - 100%)  CAPILLARY BLOOD GLUCOSE          12-12 @ 07:01  -  12-13 @ 07:00  --------------------------------------------------------  IN: 50 mL / OUT: 0 mL / NET: 50 mL        GENERAL:   NECK: supple, No JVD  CHEST/LUNG: clear to auscultation bilaterally; no rales, rhonchi, or wheezing b/l  HEART: normal S1, S2  ABDOMEN: BS+, soft, ND, NT   EXTREMITIES:  pulses palpable; no clubbing, cyanosis, or edema b/l LEs  SKIN: no rashes or lesions      LABS:                        9.6    12.24 )-----------( 164      ( 13 Dec 2019 05:43 )             32.8     12-13    139  |  92<L>  |  25<H>  ----------------------------<  142<H>  5.4<H>   |  38<H>  |  1.36<H>    Ca    9.6      13 Dec 2019 05:44  Phos  4.0     12-13  Mg     2.0     12-13    TPro  7.4  /  Alb  4.5  /  TBili  0.5  /  DBili  x   /  AST  14  /  ALT  15  /  AlkPhos  70  12-11    PT/INR - ( 11 Dec 2019 14:31 )   PT: 15.3 sec;   INR: 1.33 ratio         PTT - ( 11 Dec 2019 14:31 )  PTT:30.1 sec INTERVAL HPI/OVERNIGHT EVENTS:  Pt seen and examined at bedside.     Allergies/Intolerance: albuterol (Unknown)  No Known Allergies      MEDICATIONS  (STANDING):  apixaban 5 milliGRAM(s) Oral two times a day  atorvastatin 40 milliGRAM(s) Oral at bedtime  azithromycin   Tablet 250 milliGRAM(s) Oral daily  budesonide 160 MICROgram(s)/formoterol 4.5 MICROgram(s) Inhaler 2 Puff(s) Inhalation two times a day  dextrose 5%. 1000 milliLiter(s) (50 mL/Hr) IV Continuous <Continuous>  dextrose 50% Injectable 25 Gram(s) IV Push once  dextrose 50% Injectable 12.5 Gram(s) IV Push once  dextrose 50% Injectable 25 Gram(s) IV Push once  dextrose 50% Injectable 25 Gram(s) IV Push once  furosemide    Tablet 40 milliGRAM(s) Oral daily  influenza   Vaccine 0.5 milliLiter(s) IntraMuscular once  insulin lispro (HumaLOG) corrective regimen sliding scale   SubCutaneous three times a day before meals  insulin lispro (HumaLOG) corrective regimen sliding scale   SubCutaneous at bedtime  montelukast 10 milliGRAM(s) Oral daily  pantoprazole    Tablet 40 milliGRAM(s) Oral before breakfast  tiotropium 18 MICROgram(s) Capsule 1 Capsule(s) Inhalation daily    MEDICATIONS  (PRN):  dextrose 40% Gel 15 Gram(s) Oral once PRN Blood Glucose LESS THAN 70 milliGRAM(s)/deciliter  glucagon  Injectable 1 milliGRAM(s) IntraMuscular once PRN Glucose LESS THAN 70 milligrams/deciliter  levalbuterol Inhalation 0.63 milliGRAM(s) Inhalation every 6 hours PRN shortness of breath        ROS: all systems reviewed and wnl      PHYSICAL EXAMINATION:  Vital Signs Last 24 Hrs  T(C): 36.6 (13 Dec 2019 03:55), Max: 36.8 (12 Dec 2019 21:23)  T(F): 97.8 (13 Dec 2019 03:55), Max: 98.2 (12 Dec 2019 21:23)  HR: 89 (13 Dec 2019 05:42) (83 - 145)  BP: 127/74 (13 Dec 2019 03:55) (127/74 - 134/79)  BP(mean): --  RR: 18 (13 Dec 2019 03:55) (16 - 18)  SpO2: 94% (13 Dec 2019 05:42) (94% - 100%)  CAPILLARY BLOOD GLUCOSE          12-12 @ 07:01  -  12-13 @ 07:00  --------------------------------------------------------  IN: 50 mL / OUT: 0 mL / NET: 50 mL        GENERAL: comfortable over night, no CP or SOB  NECK: supple, No JVD  CHEST/LUNG: clear to auscultation bilaterally; no rales, rhonchi, or wheezing b/l  HEART: normal S1, S2  ABDOMEN: BS+, soft, ND, NT   EXTREMITIES:  pulses palpable; no clubbing, cyanosis, or edema b/l LEs  SKIN: no rashes or lesions      LABS:                        9.6    12.24 )-----------( 164      ( 13 Dec 2019 05:43 )             32.8     12-13    139  |  92<L>  |  25<H>  ----------------------------<  142<H>  5.4<H>   |  38<H>  |  1.36<H>    Ca    9.6      13 Dec 2019 05:44  Phos  4.0     12-13  Mg     2.0     12-13    TPro  7.4  /  Alb  4.5  /  TBili  0.5  /  DBili  x   /  AST  14  /  ALT  15  /  AlkPhos  70  12-11    PT/INR - ( 11 Dec 2019 14:31 )   PT: 15.3 sec;   INR: 1.33 ratio         PTT - ( 11 Dec 2019 14:31 )  PTT:30.1 sec

## 2019-12-13 NOTE — DISCHARGE NOTE PROVIDER - CARE PROVIDER_API CALL
Tim Forrest)  Cardiac Electrophysiology; Cardiology  [address] [place] [address]  Phone: [phone]  Fax: [phone]  Follow Up Time:

## 2019-12-13 NOTE — PROVIDER CONTACT NOTE (CRITICAL VALUE NOTIFICATION) - ACTION/TREATMENT ORDERED:
provider ordered: No interventions ordered at this time.  continue monitoring; notify provider of any changes; continue monitoring on tele

## 2019-12-13 NOTE — CHART NOTE - NSCHARTNOTEFT_GEN_A_CORE
RN notified that patient is complaining of chest pain. Patient was seen at bedside, reported chest pain was 6/10 midsternal and subsided now. EKG done, showed SR at HR 82 with PVC's, no T or ST changes. KCL 20meq po x 1 administered.  VSS. CE's ordered. Will continue to assess and monitor. F/U with Am team.

## 2019-12-13 NOTE — PROGRESS NOTE ADULT - SUBJECTIVE AND OBJECTIVE BOX
Daily In-House Electrophsiology Progress Note  -------------------------------------------------------    Patient seen and examined at bedside in [facility]    Subjective:      -Felt better after ablation; now feeling SOB because has not received breathing treatments.  -Brief episodes of chest pain in the AM that abated.    Telemetry:  NSR 70-90s    Current Meds:  apixaban 5 milliGRAM(s) Oral two times a day  atorvastatin 40 milliGRAM(s) Oral at bedtime  azithromycin   Tablet 250 milliGRAM(s) Oral daily  budesonide 160 MICROgram(s)/formoterol 4.5 MICROgram(s) Inhaler 2 Puff(s) Inhalation two times a day  dextrose 40% Gel 15 Gram(s) Oral once PRN  dextrose 5%. 1000 milliLiter(s) IV Continuous <Continuous>  dextrose 50% Injectable 25 Gram(s) IV Push once  dextrose 50% Injectable 12.5 Gram(s) IV Push once  dextrose 50% Injectable 25 Gram(s) IV Push once  dextrose 50% Injectable 25 Gram(s) IV Push once  furosemide    Tablet 40 milliGRAM(s) Oral daily  glucagon  Injectable 1 milliGRAM(s) IntraMuscular once PRN  influenza   Vaccine 0.5 milliLiter(s) IntraMuscular once  insulin lispro (HumaLOG) corrective regimen sliding scale   SubCutaneous three times a day before meals  insulin lispro (HumaLOG) corrective regimen sliding scale   SubCutaneous at bedtime  levalbuterol Inhalation 0.63 milliGRAM(s) Inhalation every 6 hours PRN  montelukast 10 milliGRAM(s) Oral daily  pantoprazole    Tablet 40 milliGRAM(s) Oral before breakfast  tiotropium 18 MICROgram(s) Capsule 1 Capsule(s) Inhalation daily    Vitals:  T(F): 97.8 (12-13), Max: 98.2 (12-12)  HR: 89 (12-13) (83 - 89)  BP: 127/74 (12-13) (127/74 - 134/79)  RR: 18 (12-13)  SpO2: 94% (12-13)  I&O's Summary    12 Dec 2019 07:01  -  13 Dec 2019 07:00  --------------------------------------------------------  IN: 50 mL / OUT: 0 mL / NET: 50 mL    Physical Exam:  Appearance: No acute distress; well appearing  Eyes: PERRL, EOMI, pink conjunctiva  HEENT: Normal oral mucosa  Cardiovascular: RRR, S1, S2, no murmurs, rubs, or gallops; no edema; no JVD  Respiratory: Reduced breath sounds b/l; no wheezes or rales  Gastrointestinal: soft, non-tender, non-distended with normal bowel sounds  Musculoskeletal: No clubbing; no joint deformity   Neurologic: Non-focal  Lymphatic: No lymphadenopathy  Psychiatry: AAOx3, mood & affect appropriate  Skin: No rashes, ecchymoses, or cyanosis; R groin with no hematoma, mild oozing of blood, but now dry                          9.6    12.24 )-----------( 164      ( 13 Dec 2019 05:43 )             32.8     12-13    137  |  89<L>  |  24<H>  ----------------------------<  199<H>  5.4<H>   |  44<H>  |  1.23    Ca    9.5      13 Dec 2019 11:24  Phos  4.0     12-13  Mg     2.0     12-13    TPro  7.4  /  Alb  4.5  /  TBili  0.5  /  DBili  x   /  AST  14  /  ALT  15  /  AlkPhos  70  12-11    PT/INR - ( 11 Dec 2019 14:31 )   PT: 15.3 sec;   INR: 1.33 ratio         PTT - ( 11 Dec 2019 14:31 )  PTT:30.1 sec  CARDIAC MARKERS ( 13 Dec 2019 05:43 )  449 ng/L / x     / x     / 35 U/L / x     / 5.0 ng/mL  CARDIAC MARKERS ( 11 Dec 2019 14:31 )  50 ng/L / x     / x     / x     / x     / x

## 2019-12-13 NOTE — DISCHARGE NOTE PROVIDER - HOSPITAL COURSE
60 yo female PMH significant for COPD on home O2 3L, HTN, HLD, CAD s/p x6 stents, pHTN, PVD./ normal EF. Was hospitalized in 1/2019 for COPD exacerbation. EP was previously consulted due to her having new onset AFib with RVR. Had successful DCCV 1/7 and was discharged in NSR. Was started on Eliquis 5mg PO BID at that time for [id number] 4 and continued on oral diltiazem for rate control.        Now presenting with SOB, rapid AFlutter with . Was seen by EP, started on IV cardizem drip. Had A.flutter ablation today. Will continue on Eliquis 5 mg bid. Change IV Cardizem drip back to oral in AM. Continue Lipitor 40 mg/day for HLD and Lasix 40 mg/day.         Pulmonary: Continue Symbicort bid, Spiriva daily, Singular 10 mg/day and Azithromax 250 mg/day. Not for active infection.         Discharge home AM Friday on Eliquis and Cardizem.         Follows with [facility]. Being eval for lung transplant surgery for COPD. 60 yo female PMH significant for COPD on home O2 3L, HTN, HLD, CAD s/p x6 stents, pHTN, PVD./ normal EF. Was hospitalized in 1/2019 for COPD exacerbation. EP was previously consulted due to her having new onset AFib with RVR. Had successful DCCV 1/7 and was discharged in NSR. Was started on Eliquis 5mg PO BID at that time for [id number] 4 and continued on oral diltiazem for rate control.        Now presenting with SOB, rapid AFlutter with . Was seen by EP, started on IV cardizem drip. Had A.flutter ablation today. Will continue on Eliquis 5 mg bid. Change IV Cardizem drip back to oral in AM. Continue Lipitor 40 mg/day for HLD and Lasix 40 mg/day.         Pulmonary: Continue Symbicort bid, Spiriva daily, Singular 10 mg/day and Azithromax 250 mg/day. Not for active infection.         Discharge home on Eliquis and Cardizem.         Follows with [facility]. Being eval for lung transplant surgery for COPD.          Discharge with outpatient follow-up 58 yo female PMH significant for COPD on home O2 3L, HTN, HLD, CAD s/p x6 stents, pulmonary HTN, normal EF on prior TTE. Was hospitalized in 1/2019 for COPD exacerbation. EP was previously consulted due to her having new onset AFib with RVR. Had successful DCCV 1/7/19 and was discharged in NSR. Was started on Eliquis 5mg PO BID at that time and continued on oral diltiazem for rate control.        Now presenting with SOB, rapid AFlutter with . Was seen by EP, started on IV cardizem drip. Had A.flutter ablation the next day on Eliquis. Will continue on Eliquis 5 mg bid. Change IV Cardizem drip back to oral in AM. Continue Lipitor 40 mg/day for HLD and Lasix 40 mg/day.  Continue Symbicort bid, Spiriva daily, Singular 10 mg/day and Azithromax 250 mg/day. Not for active infection. CT chest normal study. CBC normal. TSH normal 2.20. CXR clear. Blood cultures negative and RVP panel negative.  Discharge home on Eliquis and Cardizem.  Follows with [facility]. Being eval for lung transplant surgery for COPD.  See attached med list.  Discharge with outpatient follow-up with PMD. Post a-flutter ablation had NSR with RBBB. 60 yo female PMH significant for COPD on home O2 3L, HTN, HLD, CAD s/p x6 stents, pulmonary HTN, normal EF on prior TTE. Was hospitalized in 1/2019 for COPD exacerbation. EP was previously consulted due to her having new onset AFib with RVR. Had successful DCCV 1/7/19 and was discharged in NSR. Was started on Eliquis 5mg PO BID at that time and continued on oral diltiazem for rate control.        Now presenting with SOB, rapid AFlutter with . Was seen by EP, started on IV cardizem drip. Had A.flutter ablation the next day on Eliquis. Will continue on Eliquis 5 mg bid. Change IV Cardizem drip back to oral in AM. Continue Lipitor 40 mg/day for HLD and Lasix 40 mg/day.  Continue Symbicort bid, Spiriva daily, Singular 10 mg/day and Azithromax 250 mg/day. Not for active infection. CT chest normal study. CBC normal. TSH normal 2.20. CXR clear. Blood cultures negative and RVP panel negative.          Discharge home on Eliquis and Cardizem.  Follows with [facility]. Being eval for lung transplant surgery for COPD.  See attached med list.  Discharge with outpatient follow-up with PMD. Post a-flutter ablation had NSR with RBBB.

## 2019-12-13 NOTE — DISCHARGE NOTE PROVIDER - NSDCFUADDAPPT_GEN_ALL_CORE_FT
Follow-up with your Primary Care Doctor within one week  Follow-up BMP with your Primary Care Doctor   Follow-up with EP (appointment given by EP)

## 2019-12-14 ENCOUNTER — RX RENEWAL (OUTPATIENT)
Age: 59
End: 2019-12-14

## 2019-12-14 NOTE — CHART NOTE - NSCHARTNOTEFT_GEN_A_CORE
· Note Type                                                          	Atrial Flutter Ablation      Atrial Flutter Ablation (CTI)    Last Name: Gary   First Name: Arturo           MR# [id number]            : 1960  Date of Procedure: 2019  Referring physician: Anita Mathew MD   Preprocedure Diagnosis:   1.	Typical (CTI) atrial flutter   2.	Severe COPD, home oxygen dependent awaiting lung transplant  3.	Severe dyspnea and orthopnea  4.            Severe anxiety and reluctance to receive sedation for fear of intubation    Postprocedure Diagnosis:   1.	Successful ablation of CTI isthmus with 3D mapping and ablation (06452)  2.	CS/LA pacing recording (19090)  3.	3D mapping (99123)  4.	Confirmation of bidirectional block across the isthmus post ablation  5.            Iliac IVC narrowing making it difficult to pass J wire or sheath which was crossed with the help of Dr. Huber Lamas    Anesthesia/Sedation: MAC by EP Anesthesia Service  EBL: < 50cc  Complications: None  Fluoroscopy: None for ablation, 10 minutes for access due to iliac IVC narrowing  EVAN pre procedure: No  [id number]: 2  Sheaths and Catheters:  RFV:  8.5F  Abbott  SR0 Sheath with a   Biosense  7F STSF DF catheter to the RA, His and RV  RFV:  7F  Cordis  short  sheath with an   Biosense  7F unidirectional steerable decapolar catheter to the CS/LA  Device Present: None   Anticoagulation: 8000 units IV heparin bolus and 1800 units/hour to keep -300  secs  Last dose OAC: Eliquis 5 mg 12 hours prior to procedure  Protamine 30 mg    Brief history: 59 year old woman with severe end stage lung disease, awaiting lung transplant with worsening dyspnea for past few months likely due to development of CTI flutter. Had undergone CV in 2019. Normal LV function.  No syncope. Her severe anxiety and fear of respiratory failure made the case very difficult.    Procedure details:  The patient was brought to the EP lab in the post absorptive nonsedated state after informed consent was obtained. He was prepped and draped in the usual manner. The sheaths were placed as above using Seldinger technique (SRO sheath passed to the RA by Dr. Lamas and the patient was anticoagulated with IV heparin and ACT checked q 15 min. The ablation catheter was advanced to the RA and respiratory gating was performed and the catheter force was zeroed. A volumetric map of the SVC, RA, IVC and CS was created and used to place the CS catheter. The baseline atrial rhythm was atrial flutter with a CL of 220  ms. CS activation was RA to LA (proximal to distal). Concealed entrainment was achieved by pacing the CTI with a PPI of 220 ms   Ablation was carried out using 40-50 W and using an Ablation index of 400 to obtain an ablation tag.  Ablation was carried out from the TV annulus at 6 o’clock in the LA caudal view and the catheter was dragged back to the IVC ablating about 10 seconds per site. Complete loss of electrograms in the isthmus was documented.  During atrial flutter, isthmus conduction time was 60 ms between the posterolateral RA and CS os and post ablation with pacing during sinus was 140 ms in both directions.   Atrial flutter terminated during RF. Further lesions were given in sinus rhythm. Rhythm was sinus with CL of 680 ms. No significant pause present on conversion from atrial flutter to sinus. Burst pacing in the CS at 300 down to 190 ms as well as triple extrastimuli at 400/220/200/180ms failed to induce any atrial dysrhythmias.    Intervals:  ECG in sinus post ablation: Normal sinus 90 bpm,  ms QRS duration 150 ms RBBB, QTc 460  P wave to His A:  40ms ms  AH interval:  72 ms; AVNW: 320 ms (sedated), no echos  VA conduction: Yes   VAW: 340 ms midline  HV interval:  50 ms  RVOT ERP:  400/290   Atrial flutter inducible post ablation: No  Other arrhythmias inducible: None    Conclusions: Successful CTI ablation which terminated atrial flutter and confirmation of post ablation bidirectional block and non inducibility of atrial fibrillation. Apixaban will be restarted later today

## 2019-12-16 ENCOUNTER — RX RENEWAL (OUTPATIENT)
Age: 59
End: 2019-12-16

## 2019-12-16 LAB
CULTURE RESULTS: SIGNIFICANT CHANGE UP
CULTURE RESULTS: SIGNIFICANT CHANGE UP
SPECIMEN SOURCE: SIGNIFICANT CHANGE UP
SPECIMEN SOURCE: SIGNIFICANT CHANGE UP

## 2019-12-16 NOTE — DISCUSSION/SUMMARY
[FreeTextEntry1] : Patient admitted to [facility] on 12/11 for elevated HR. Pt s/p A flutter ablasion. Pt d/c on 12/13. Pt scheduled to see Cardiologist on 12/17. No new medications ordered. No home care. Pt transferred to call center to make f/u appt.

## 2019-12-17 ENCOUNTER — NON-APPOINTMENT (OUTPATIENT)
Age: 59
End: 2019-12-17

## 2019-12-17 ENCOUNTER — APPOINTMENT (OUTPATIENT)
Dept: CARDIOLOGY | Facility: CLINIC | Age: 59
End: 2019-12-17
Payer: COMMERCIAL

## 2019-12-17 VITALS
HEART RATE: 60 BPM | DIASTOLIC BLOOD PRESSURE: 70 MMHG | BODY MASS INDEX: 31.92 KG/M2 | WEIGHT: 187 LBS | HEIGHT: 64 IN | RESPIRATION RATE: 16 BRPM | SYSTOLIC BLOOD PRESSURE: 130 MMHG

## 2019-12-17 DIAGNOSIS — R06.02 SHORTNESS OF BREATH: ICD-10-CM

## 2019-12-17 DIAGNOSIS — R06.00 DYSPNEA, UNSPECIFIED: ICD-10-CM

## 2019-12-17 DIAGNOSIS — Z87.891 PERSONAL HISTORY OF NICOTINE DEPENDENCE: ICD-10-CM

## 2019-12-17 DIAGNOSIS — I25.2 OLD MYOCARDIAL INFARCTION: ICD-10-CM

## 2019-12-17 LAB
CULTURE RESULTS: SIGNIFICANT CHANGE UP
SPECIMEN SOURCE: SIGNIFICANT CHANGE UP

## 2019-12-17 PROCEDURE — 93000 ELECTROCARDIOGRAM COMPLETE: CPT

## 2019-12-17 PROCEDURE — 99214 OFFICE O/P EST MOD 30 MIN: CPT

## 2019-12-18 RX ORDER — PREDNISONE 5 MG/1
5 TABLET ORAL DAILY
Qty: 90 | Refills: 3 | Status: COMPLETED | COMMUNITY
Start: 2019-10-15 | End: 2019-12-18

## 2019-12-19 ENCOUNTER — INBOUND DOCUMENT (OUTPATIENT)
Age: 59
End: 2019-12-19

## 2019-12-20 ENCOUNTER — APPOINTMENT (OUTPATIENT)
Dept: ELECTROPHYSIOLOGY | Facility: CLINIC | Age: 59
End: 2019-12-20

## 2020-01-08 ENCOUNTER — APPOINTMENT (OUTPATIENT)
Dept: INTERNAL MEDICINE | Facility: CLINIC | Age: 60
End: 2020-01-08
Payer: COMMERCIAL

## 2020-01-08 ENCOUNTER — LABORATORY RESULT (OUTPATIENT)
Age: 60
End: 2020-01-08

## 2020-01-08 VITALS
WEIGHT: 185 LBS | BODY MASS INDEX: 31.58 KG/M2 | HEIGHT: 64 IN | HEART RATE: 88 BPM | SYSTOLIC BLOOD PRESSURE: 160 MMHG | TEMPERATURE: 98 F | DIASTOLIC BLOOD PRESSURE: 84 MMHG | OXYGEN SATURATION: 98 %

## 2020-01-08 PROCEDURE — 36415 COLL VENOUS BLD VENIPUNCTURE: CPT

## 2020-01-08 PROCEDURE — 99214 OFFICE O/P EST MOD 30 MIN: CPT | Mod: 25

## 2020-01-08 RX ORDER — DOXYCYCLINE 100 MG/1
100 CAPSULE ORAL TWICE DAILY
Qty: 20 | Refills: 0 | Status: DISCONTINUED | COMMUNITY
Start: 2019-12-19 | End: 2020-01-08

## 2020-01-08 RX ORDER — PREDNISONE 10 MG/1
10 TABLET ORAL
Qty: 90 | Refills: 2 | Status: DISCONTINUED | COMMUNITY
Start: 2018-02-15 | End: 2020-01-08

## 2020-01-08 RX ORDER — NYSTATIN 100000 [USP'U]/ML
100000 SUSPENSION ORAL 3 TIMES DAILY
Qty: 150 | Refills: 0 | Status: DISCONTINUED | COMMUNITY
Start: 2019-04-17 | End: 2020-01-08

## 2020-01-08 NOTE — COUNSELING
[Healthy eating counseling provided] : healthy eating [Weight management counseling provided] : Weight management [Activity counseling provided] : activity [Weight Self Once Weekly] : Weight self once weekly [Low Fat Diet] : Low fat diet [Walking] : Walking [Low Salt Diet] : Low salt diet [Patient Non-adherent to care plan] : Patient non-adherent to care plan [Patient motivation] : Patient motivation

## 2020-01-08 NOTE — REVIEW OF SYSTEMS
[Vision Problems] : vision problems [Nasal Discharge] : nasal discharge [Palpitations] : palpitations [Postnasal Drip] : postnasal drip [Cough] : cough [Shortness Of Breath] : shortness of breath [Heartburn] : heartburn [Dyspnea on Exertion] : dyspnea on exertion [Joint Pain] : joint pain [Incontinence] : incontinence [Back Pain] : back pain [Insomnia] : insomnia [Anxiety] : anxiety [Easy Bruising] : easy bruising [Negative] : Heme/Lymph

## 2020-01-08 NOTE — HISTORY OF PRESENT ILLNESS
[FreeTextEntry1] : Comes in for f/u medical visit. [de-identified] : Remains on oxygen.\par Trying to use BIPAP  daily.\par No fever.\par Able to ambulate.\par No calf pain or swelling.\par Normal BM/urination.\par No abdominal pain or n/v.\par Trying to diet. \par Denies chest pain.\par Severe NIELSON with minimal exertion.\par Has cough with white sputum / no hemoptysis.\par Feels that she is continuing to slowly decline.\par Seeing Dr. Cervantes next week.\par Needs EPS f/u.\par Still intermittently hears/feels her heart beating/palpitations.

## 2020-01-08 NOTE — PHYSICAL EXAM
[No Acute Distress] : no acute distress [Well Developed] : well developed [Well-Appearing] : well-appearing [Well Nourished] : well nourished [Normal Voice/Communication] : normal voice/communication [PERRL] : pupils equal round and reactive to light [Normal Sclera/Conjunctiva] : normal sclera/conjunctiva [Normal Oropharynx] : the oropharynx was normal [Normal Outer Ear/Nose] : the outer ears and nose were normal in appearance [EOMI] : extraocular movements intact [Supple] : supple [No JVD] : no jugular venous distention [Clear to Auscultation] : lungs were clear to auscultation bilaterally [No Respiratory Distress] : no respiratory distress  [No Accessory Muscle Use] : no accessory muscle use [Normal Rate] : normal rate  [No Edema] : there was no peripheral edema [Normal S1, S2] : normal S1 and S2 [Regular Rhythm] : with a regular rhythm [No Extremity Clubbing/Cyanosis] : no extremity clubbing/cyanosis [Soft] : abdomen soft [Normal Bowel Sounds] : normal bowel sounds [No CVA Tenderness] : no CVA  tenderness [No Rash] : no rash [No Spinal Tenderness] : no spinal tenderness [No Focal Deficits] : no focal deficits [Normal Gait] : normal gait [Speech Grossly Normal] : speech grossly normal [Normal Affect] : the affect was normal [Alert and Oriented x3] : oriented to person, place, and time [de-identified] : Wears NC oxygen; pursed-lip breathing with activity [de-identified] : no ST [de-identified] : no stridor [de-identified] : R=16-20;  very diminished AE; no wheezing [de-identified] : no cords

## 2020-01-08 NOTE — ASSESSMENT
[FreeTextEntry1] : Atrial tachycardia\par S/P ablation\par Still getting palpitations\par To see EPS  ?event recorder\par On Eliquis for AF\par Continue Diltiazem\par \par DM\par Has been in good control\par Off Metformin\par MO\par Weight control\par ADA diet\par Podiatry and Ophtho f/u\par Monitor glucose, HGBA1C\par Monitor FSG's\par Continue low fat diet and daily statin = will monitor lipid profile\par \par HTN\par BP high today\par Low salt diet\par Weight control\par Cardizem 240 mg daily\par Cardiology f/u with Dr. Chapa\par \par COPD\par End-stage\par Awaiting possible lung transplant = sees Dr. Billy BECKER next week\par Continue oxygen 3 LPM / 4 LPM with exercise\par BIPAP 6-8 hours per day -  15/5\par MO\par Will monitor CT/PFT's\par Will need flu vaccine\par No smoking\par Continue Symbicort, Spiriva, Singulair, Charan\par Nebs qid\par Zithromax daily\par Mucinex DM bid\par \par RTC 4-6 weeks and as needed\par To call for any medical issues\par Full labs sent\par D/W patient and Michele in detail

## 2020-01-08 NOTE — HEALTH RISK ASSESSMENT
[No falls in past year] : Patient reported no falls in the past year [No] : In the past 12 months have you used drugs other than those required for medical reasons? No [0] : 2) Feeling down, depressed, or hopeless: Not at all (0) [] : No [de-identified] : cardiology [de-identified] : none [de-identified] : ADA; low salt; low fat [[id number]] : 0

## 2020-01-10 ENCOUNTER — MEDICATION RENEWAL (OUTPATIENT)
Age: 60
End: 2020-01-10

## 2020-01-10 LAB
ALBUMIN SERPL ELPH-MCNC: 4.4 G/DL
ALP BLD-CCNC: 66 U/L
ALT SERPL-CCNC: 11 U/L
ANION GAP SERPL CALC-SCNC: 12 MMOL/L
AST SERPL-CCNC: 16 U/L
BASOPHILS # BLD AUTO: 0.04 K/UL
BASOPHILS NFR BLD AUTO: 0.5 %
BILIRUB SERPL-MCNC: 0.4 MG/DL
BUN SERPL-MCNC: 19 MG/DL
CALCIUM SERPL-MCNC: 10 MG/DL
CHLORIDE SERPL-SCNC: 96 MMOL/L
CHOLEST SERPL-MCNC: 176 MG/DL
CHOLEST/HDLC SERPL: 2 RATIO
CO2 SERPL-SCNC: 34 MMOL/L
CREAT SERPL-MCNC: 1.15 MG/DL
EOSINOPHIL # BLD AUTO: 0.11 K/UL
EOSINOPHIL NFR BLD AUTO: 1.3 %
ESTIMATED AVERAGE GLUCOSE: 174 MG/DL
FOLATE SERPL-MCNC: 9.9 NG/ML
GLUCOSE SERPL-MCNC: 161 MG/DL
HBA1C MFR BLD HPLC: 7.7 %
HCT VFR BLD CALC: 38.2 %
HDLC SERPL-MCNC: 87 MG/DL
HGB BLD-MCNC: 11 G/DL
IMM GRANULOCYTES NFR BLD AUTO: 0.6 %
IRON SERPL-MCNC: 49 UG/DL
LDLC SERPL CALC-MCNC: 76 MG/DL
LDLC SERPL DIRECT ASSAY-MCNC: 75 MG/DL
LYMPHOCYTES # BLD AUTO: 1.34 K/UL
LYMPHOCYTES NFR BLD AUTO: 15.3 %
MAGNESIUM SERPL-MCNC: 1.9 MG/DL
MAN DIFF?: NORMAL
MCHC RBC-ENTMCNC: 25.2 PG
MCHC RBC-ENTMCNC: 28.8 GM/DL
MCV RBC AUTO: 87.4 FL
MONOCYTES # BLD AUTO: 0.68 K/UL
MONOCYTES NFR BLD AUTO: 7.7 %
NEUTROPHILS # BLD AUTO: 6.56 K/UL
NEUTROPHILS NFR BLD AUTO: 74.6 %
PLATELET # BLD AUTO: 249 K/UL
POTASSIUM SERPL-SCNC: 4.6 MMOL/L
PROT SERPL-MCNC: 6.4 G/DL
RBC # BLD: 4.37 M/UL
RBC # FLD: 14.3 %
SODIUM SERPL-SCNC: 142 MMOL/L
TRIGL SERPL-MCNC: 64 MG/DL
TSH SERPL-ACNC: 1.61 UIU/ML
VIT B12 SERPL-MCNC: 640 PG/ML
WBC # FLD AUTO: 8.78 K/UL

## 2020-01-20 NOTE — PROGRESS NOTE ADULT - PROBLEM SELECTOR PLAN 4
on home O2 3L.   - Continue COPD management as above.  - Continue on BIPAP bedtime  - per pulm  - pending lung transplant eval w/ [facility] Normal for race

## 2020-01-21 ENCOUNTER — RX RENEWAL (OUTPATIENT)
Age: 60
End: 2020-01-21

## 2020-01-29 ENCOUNTER — APPOINTMENT (OUTPATIENT)
Dept: CARDIOLOGY | Facility: CLINIC | Age: 60
End: 2020-01-29
Payer: COMMERCIAL

## 2020-01-29 ENCOUNTER — RX RENEWAL (OUTPATIENT)
Age: 60
End: 2020-01-29

## 2020-01-29 ENCOUNTER — APPOINTMENT (OUTPATIENT)
Dept: CARDIOLOGY | Facility: CLINIC | Age: 60
End: 2020-01-29

## 2020-01-29 ENCOUNTER — NON-APPOINTMENT (OUTPATIENT)
Age: 60
End: 2020-01-29

## 2020-01-29 VITALS
SYSTOLIC BLOOD PRESSURE: 130 MMHG | RESPIRATION RATE: 15 BRPM | HEIGHT: 64 IN | WEIGHT: 185 LBS | DIASTOLIC BLOOD PRESSURE: 83 MMHG | OXYGEN SATURATION: 86 % | BODY MASS INDEX: 31.58 KG/M2

## 2020-01-29 DIAGNOSIS — I20.9 ANGINA PECTORIS, UNSPECIFIED: ICD-10-CM

## 2020-01-29 DIAGNOSIS — R00.2 PALPITATIONS: ICD-10-CM

## 2020-01-29 DIAGNOSIS — R01.1 CARDIAC MURMUR, UNSPECIFIED: ICD-10-CM

## 2020-01-29 DIAGNOSIS — Z95.5 PRESENCE OF CORONARY ANGIOPLASTY IMPLANT AND GRAFT: ICD-10-CM

## 2020-01-29 DIAGNOSIS — I45.10 UNSPECIFIED RIGHT BUNDLE-BRANCH BLOCK: ICD-10-CM

## 2020-01-29 DIAGNOSIS — I25.10 ATHEROSCLEROTIC HEART DISEASE OF NATIVE CORONARY ARTERY W/OUT ANGINA PECTORIS: ICD-10-CM

## 2020-01-29 PROCEDURE — 93000 ELECTROCARDIOGRAM COMPLETE: CPT

## 2020-01-29 PROCEDURE — 99214 OFFICE O/P EST MOD 30 MIN: CPT

## 2020-01-30 RX ORDER — DILTIAZEM HYDROCHLORIDE 180 MG/1
180 CAPSULE, EXTENDED RELEASE ORAL
Qty: 90 | Refills: 3 | Status: COMPLETED | COMMUNITY
End: 2020-01-30

## 2020-02-04 ENCOUNTER — RX RENEWAL (OUTPATIENT)
Age: 60
End: 2020-02-04

## 2020-02-19 ENCOUNTER — APPOINTMENT (OUTPATIENT)
Dept: INTERNAL MEDICINE | Facility: CLINIC | Age: 60
End: 2020-02-19
Payer: COMMERCIAL

## 2020-02-19 VITALS
BODY MASS INDEX: 31.93 KG/M2 | TEMPERATURE: 98.5 F | DIASTOLIC BLOOD PRESSURE: 70 MMHG | SYSTOLIC BLOOD PRESSURE: 132 MMHG | OXYGEN SATURATION: 95 % | HEART RATE: 85 BPM | WEIGHT: 186 LBS

## 2020-02-19 PROCEDURE — 99214 OFFICE O/P EST MOD 30 MIN: CPT | Mod: 25

## 2020-02-19 PROCEDURE — 36415 COLL VENOUS BLD VENIPUNCTURE: CPT

## 2020-02-19 NOTE — COUNSELING
[Weight management counseling provided] : Weight management [Weight Self Once Weekly] : Weight self once weekly [Activity counseling provided] : activity [Healthy eating counseling provided] : healthy eating [Walking] : Walking [Low Salt Diet] : Low salt diet [Low Fat Diet] : Low fat diet [Patient motivation] : Patient motivation [Patient Non-adherent to care plan] : Patient non-adherent to care plan

## 2020-02-19 NOTE — PHYSICAL EXAM
[Well Nourished] : well nourished [No Acute Distress] : no acute distress [Well-Appearing] : well-appearing [Normal Voice/Communication] : normal voice/communication [Well Developed] : well developed [PERRL] : pupils equal round and reactive to light [Normal Sclera/Conjunctiva] : normal sclera/conjunctiva [Normal Outer Ear/Nose] : the outer ears and nose were normal in appearance [EOMI] : extraocular movements intact [No JVD] : no jugular venous distention [Normal Oropharynx] : the oropharynx was normal [Supple] : supple [No Accessory Muscle Use] : no accessory muscle use [Clear to Auscultation] : lungs were clear to auscultation bilaterally [No Respiratory Distress] : no respiratory distress  [Normal Rate] : normal rate  [Regular Rhythm] : with a regular rhythm [Normal S1, S2] : normal S1 and S2 [No Extremity Clubbing/Cyanosis] : no extremity clubbing/cyanosis [No Edema] : there was no peripheral edema [No CVA Tenderness] : no CVA  tenderness [Soft] : abdomen soft [Normal Bowel Sounds] : normal bowel sounds [Normal Gait] : normal gait [No Spinal Tenderness] : no spinal tenderness [No Rash] : no rash [No Focal Deficits] : no focal deficits [Speech Grossly Normal] : speech grossly normal [Normal Affect] : the affect was normal [Alert and Oriented x3] : oriented to person, place, and time [de-identified] : Wears NC oxygen; pursed-lip breathing with activity [de-identified] : no stridor [de-identified] : no ST [de-identified] : R=16-20;  very diminished AE; no wheezing [de-identified] : no cords

## 2020-02-19 NOTE — HISTORY OF PRESENT ILLNESS
[FreeTextEntry1] : Comes in for f/u medical visit. [de-identified] : Remains on oxygen.\par Trying to use BIPAP  daily.\par No fever.\par Able to ambulate.\par No calf pain or swelling.\par Normal BM/urination.\par No abdominal pain or n/v.\par Trying to diet. \par Denies chest pain.\par Severe NIELSON with minimal exertion.\par Has cough with white sputum / no hemoptysis.\par Feels that she is continuing to slowly decline.\par Seeing Dr. Cervantes again in 4/2020. Needs FL and 9 pound weight loss.\par Needs EPS f/u.\par

## 2020-02-19 NOTE — REVIEW OF SYSTEMS
[Vision Problems] : vision problems [Postnasal Drip] : postnasal drip [Nasal Discharge] : nasal discharge [Cough] : cough [Shortness Of Breath] : shortness of breath [Palpitations] : palpitations [Heartburn] : heartburn [Dyspnea on Exertion] : dyspnea on exertion [Back Pain] : back pain [Incontinence] : incontinence [Joint Pain] : joint pain [Insomnia] : insomnia [Anxiety] : anxiety [Easy Bruising] : easy bruising [Negative] : Heme/Lymph

## 2020-02-19 NOTE — HEALTH RISK ASSESSMENT
[No falls in past year] : Patient reported no falls in the past year [No] : In the past 12 months have you used drugs other than those required for medical reasons? No [0] : 2) Feeling down, depressed, or hopeless: Not at all (0) [de-identified] : cardiology [] : No [de-identified] : none [de-identified] : ADA; low salt; low fat [[id number]] : 0

## 2020-02-19 NOTE — ASSESSMENT
[FreeTextEntry1] : Atrial tachycardia\par S/P ablation\par Still getting palpitations\par To see EPS  ?event recorder\par On Eliquis for AF\par Continue Diltiazem\par \par DM\par Last HGBA1C high at 7.7%   ? related to steroid use\par Resumed  Metformin bid\par AR\par Weight control\par ADA diet\par Podiatry and Ophtho f/u\par Monitor glucose, HGBA1C\par Monitor FSG's\par Continue low fat diet and daily statin = will monitor lipid profile\par \par HTN\par BP in good range today\par Low salt diet\par Weight control\par Cardizem 240 mg daily\par Cardiology f/u with Dr. Chapa\par \par COPD\par End-stage\par Awaiting possible lung transplant = seeing Dr. Billy BECKER \par Continue oxygen 3 LPM / 4 LPM with exercise 24 hours per day\par BIPAP 6-8 hours per day -  15/5\par AR\par Will monitor CT/PFT's   RX given to do f/u CT chest 3/2020\par Had flu vaccine\par No smoking\par Continue Symbicort, Spiriva, Singulair, Charan\par Nebs qid\par Zithromax daily\par Mucinex DM bid\par \par RTC 4-6 weeks and as needed\par To call for any medical issues\par Labs/urine sent\par D/W patient and Michele in detail

## 2020-02-21 LAB
ALBUMIN SERPL ELPH-MCNC: 4.5 G/DL
ALP BLD-CCNC: 57 U/L
ALT SERPL-CCNC: 25 U/L
ANION GAP SERPL CALC-SCNC: 15 MMOL/L
APPEARANCE: ABNORMAL
AST SERPL-CCNC: 27 U/L
BACTERIA: NEGATIVE
BASOPHILS # BLD AUTO: 0.02 K/UL
BASOPHILS NFR BLD AUTO: 0.3 %
BILIRUB SERPL-MCNC: 0.3 MG/DL
BILIRUBIN URINE: NEGATIVE
BLOOD URINE: NEGATIVE
BUN SERPL-MCNC: 20 MG/DL
CALCIUM SERPL-MCNC: 10.5 MG/DL
CHLORIDE SERPL-SCNC: 95 MMOL/L
CO2 SERPL-SCNC: 33 MMOL/L
COLOR: NORMAL
CREAT SERPL-MCNC: 1.11 MG/DL
CREAT SPEC-SCNC: 71 MG/DL
EOSINOPHIL # BLD AUTO: 0.2 K/UL
EOSINOPHIL NFR BLD AUTO: 3.3 %
GLUCOSE QUALITATIVE U: NEGATIVE
GLUCOSE SERPL-MCNC: 152 MG/DL
HCT VFR BLD CALC: 40.6 %
HGB BLD-MCNC: 11.9 G/DL
HYALINE CASTS: 1 /LPF
IMM GRANULOCYTES NFR BLD AUTO: 0.3 %
KETONES URINE: NEGATIVE
LEUKOCYTE ESTERASE URINE: NEGATIVE
LYMPHOCYTES # BLD AUTO: 1.28 K/UL
LYMPHOCYTES NFR BLD AUTO: 20.8 %
MAN DIFF?: NORMAL
MCHC RBC-ENTMCNC: 25.2 PG
MCHC RBC-ENTMCNC: 29.3 GM/DL
MCV RBC AUTO: 85.8 FL
MICROALBUMIN 24H UR DL<=1MG/L-MCNC: 9 MG/DL
MICROALBUMIN/CREAT 24H UR-RTO: 126 MG/G
MICROSCOPIC-UA: NORMAL
MONOCYTES # BLD AUTO: 0.51 K/UL
MONOCYTES NFR BLD AUTO: 8.3 %
NEUTROPHILS # BLD AUTO: 4.11 K/UL
NEUTROPHILS NFR BLD AUTO: 67 %
NITRITE URINE: NEGATIVE
PH URINE: 7
PLATELET # BLD AUTO: 205 K/UL
POTASSIUM SERPL-SCNC: 4.3 MMOL/L
PROT SERPL-MCNC: 6.6 G/DL
PROTEIN URINE: NORMAL
RBC # BLD: 4.73 M/UL
RBC # FLD: 16.5 %
RED BLOOD CELLS URINE: 2 /HPF
SODIUM SERPL-SCNC: 143 MMOL/L
SPECIFIC GRAVITY URINE: 1.02
SQUAMOUS EPITHELIAL CELLS: 2 /HPF
UROBILINOGEN URINE: NORMAL
WBC # FLD AUTO: 6.14 K/UL
WHITE BLOOD CELLS URINE: 3 /HPF

## 2020-02-24 ENCOUNTER — RX RENEWAL (OUTPATIENT)
Age: 60
End: 2020-02-24

## 2020-02-29 NOTE — PROGRESS NOTE ADULT - SUBJECTIVE AND OBJECTIVE BOX
Patient/Chart(s) Diabetes Follow up note:  Interval Hx:  57 y/o F w/h/o controlled T2DM on Metformin 500mg bid. Also h/o CAD and COPD. Here with COPD exacerbation> now on Prednisone 40mg daily. Pt w/hypoglycemic episode in 30's at bedtime last night. Corrected w/juice. Pt reports feeling symptomatic. Endorses eating "a little piece of chicken" and "three scoops of rice". Frustrated over lack of progress and quality of hospital food.     Review of Systems:  General: "Can you fix my pillows?"  GI: Tolerating POs without any N/V/D/ABD PAIN.  CV: No CP/SOB  ENDO:  + S&Sx of hypoglycemia at bedtime last night.   MEDS:    insulin glargine Injectable (LANTUS) 16 Unit(s) SubCutaneous at bedtime  insulin lispro (HumaLOG) corrective regimen sliding scale   SubCutaneous three times a day before meals  insulin lispro (HumaLOG) corrective regimen sliding scale   SubCutaneous at bedtime  insulin lispro Injectable (HumaLOG) 10 Unit(s) SubCutaneous before lunch  insulin lispro Injectable (HumaLOG) 10 Unit(s) SubCutaneous before breakfast  insulin lispro Injectable (HumaLOG) 12 Unit(s) SubCutaneous with dinner  predniSONE   Tablet 40 milliGRAM(s) Oral daily    nystatin    Suspension 637278 Unit(s) Oral four times a day    Allergies    No Known Allergies        PE:   General: Female lying in bed. NAD.   Vital Signs Last 24 Hrs  T(C): 36.6 (15 Dec 2018 05:12), Max: 36.6 (15 Dec 2018 05:12)  T(F): 97.8 (15 Dec 2018 05:12), Max: 97.8 (15 Dec 2018 05:12)  HR: 89 (15 Dec 2018 08:37) (87 - 99)  BP: 135/75 (15 Dec 2018 05:12) (97/62 - 135/75)  BP(mean): --  RR: 20 (15 Dec 2018 05:12) (18 - 22)  SpO2: 96% (15 Dec 2018 08:37) (95% - 100%)  Abd: Soft, NT,ND, Obese.   Extremities: Warm. b/l trace edema  Neuro: A&O X3    LABS:    POCT Blood Glucose.: 188 mg/dL (12-15-18 @ 08:48)  POCT Blood Glucose.: 136 mg/dL (12-15-18 @ 02:00)  POCT Blood Glucose.: 105 mg/dL (12-14-18 @ 22:30)  POCT Blood Glucose.: 42 mg/dL (12-14-18 @ 22:11)  POCT Blood Glucose.: 38 mg/dL (12-14-18 @ 22:10)  POCT Blood Glucose.: 87 mg/dL (12-14-18 @ 17:25)  POCT Blood Glucose.: 203 mg/dL (12-14-18 @ 12:13)  POCT Blood Glucose.: 157 mg/dL (12-14-18 @ 08:39)  POCT Blood Glucose.: 122 mg/dL (12-14-18 @ 03:10)  POCT Blood Glucose.: 144 mg/dL (12-13-18 @ 22:53)  POCT Blood Glucose.: 83 mg/dL (12-13-18 @ 20:58)  POCT Blood Glucose.: 88 mg/dL (12-13-18 @ 13:17)  POCT Blood Glucose.: 110 mg/dL (12-13-18 @ 08:13)  POCT Blood Glucose.: 109 mg/dL (12-12-18 @ 22:53)  POCT Blood Glucose.: 228 mg/dL (12-12-18 @ 18:17)  POCT Blood Glucose.: 124 mg/dL (12-12-18 @ 11:35)                            13.7   15.4  )-----------( 189      ( 14 Dec 2018 13:58 )             40.7       12-14    131<L>  |  86<L>  |  57<H>  ----------------------------<  202<H>  4.8   |  35<H>  |  1.44<H>    Ca    8.8      14 Dec 2018 13:58  Phos  3.7     12-14  Mg     2.3     12-14        Hemoglobin A1C, Whole Blood: 7.2 % <H> [4.0 - 5.6] (11-30-18 @ 07:58)            Contact number: kateryna [phone] or [phone]

## 2020-03-25 ENCOUNTER — APPOINTMENT (OUTPATIENT)
Dept: INTERNAL MEDICINE | Facility: CLINIC | Age: 60
End: 2020-03-25

## 2020-04-18 ENCOUNTER — RX RENEWAL (OUTPATIENT)
Age: 60
End: 2020-04-18

## 2020-04-20 ENCOUNTER — APPOINTMENT (OUTPATIENT)
Dept: INTERNAL MEDICINE | Facility: CLINIC | Age: 60
End: 2020-04-20
Payer: COMMERCIAL

## 2020-04-20 PROCEDURE — G2012 BRIEF CHECK IN BY MD/QHP: CPT

## 2020-04-27 ENCOUNTER — APPOINTMENT (OUTPATIENT)
Dept: INTERNAL MEDICINE | Facility: CLINIC | Age: 60
End: 2020-04-27
Payer: COMMERCIAL

## 2020-04-27 PROCEDURE — 99213 OFFICE O/P EST LOW 20 MIN: CPT | Mod: 95

## 2020-04-27 NOTE — REVIEW OF SYSTEMS
[Vision Problems] : vision problems [Fatigue] : fatigue [Cough] : cough [Shortness Of Breath] : shortness of breath [Lower Ext Edema] : lower extremity edema [Dyspnea on Exertion] : dyspnea on exertion [Anxiety] : anxiety [Negative] : Heme/Lymph

## 2020-04-27 NOTE — HISTORY OF PRESENT ILLNESS
[Home] : at home, [unfilled] , at the time of the visit. [Patient] : the patient [Medical Office: ([facility])___] : at the medical office located in  [Self] : self [FreeTextEntry8] : The patient reports that her status worsened significantly on Friday afternoon.  She had severely labored breathing all weekend.\par She remembered to take Mucinex on Sunday and started to feel better in the afternoon on Sunday after doing so.\par She had noticed increased lower extremity edema but this has improved today.  She denies any calf pain.\par She is compliant with oxygen therapy and BiPAP therapy.  Her saturations have remained within good range.\par She denies any nausea/vomiting/diarrhea.  She denies decreased appetite.  She denies any loss of taste or smell.  She denies any fevers.  She has good urine output.  She denies any chest pain.  She does have a mild cough.  She denies any hemoptysis or purulent secretions.  Her breathing is back to her poor baseline.  She denies any headache, sore throat, or nasal symptoms.\par She continues to have severe fatigue.  She denies any palpitations.

## 2020-04-27 NOTE — PHYSICAL EXAM
[Well Nourished] : well nourished [Well Developed] : well developed [No Acute Distress] : no acute distress [Normal Sclera/Conjunctiva] : normal sclera/conjunctiva [Well-Appearing] : well-appearing [Normal Voice/Communication] : normal voice/communication [Normal Outer Ear/Nose] : the outer ears and nose were normal in appearance [No Respiratory Distress] : no respiratory distress  [No Accessory Muscle Use] : no accessory muscle use [No Extremity Clubbing/Cyanosis] : no extremity clubbing/cyanosis [No Edema] : there was no peripheral edema [No Rash] : no rash [Alert and Oriented x3] : oriented to person, place, and time [Normal Affect] : the affect was normal [Speech Grossly Normal] : speech grossly normal [de-identified] : Pursed lip breathing; respiratory rate= 20; no audible wheezing or cough noted

## 2020-04-27 NOTE — ASSESSMENT
[FreeTextEntry1] : Concerned that the patient may have COVID-19 infection triggering an exacerbation of her COPD.\par Her status seem to worsen at days 5-8 of her illness.  She has had some clinical improvement in her status over the last 24 hours.\par She will go to the emergency room if she develops worsening shortness of breath or any oxygen desaturation\par She will rest\par A low-salt diet and lower extremity elevation and compression hose were advised\par She will continue diuretic therapy\par She will continue BiPAP therapy\par She will continue oxygen therapy and closely monitor her oxygen saturations\par She will continue prednisone\par She will continue nebulizer therapy\par She will continue all of her usual COPD medications\par She will continue Mucinex twice daily\par She will complete her course of antibiotic therapy\par She will do a telehealth visit in 1 week and as needed\par She will call for any medical/pulmonary issues\par She will call if her status worsens or does not continue to improve\par All of her questions were answered and she was reassured\par She verbally confirmed understanding of all of the above\par We will contact her mail away pharmacy regarding an error on her albuterol nebulizer prescription as she requested

## 2020-04-27 NOTE — HEALTH RISK ASSESSMENT
[] : No [No falls in past year] : Patient reported no falls in the past year [No] : In the past 12 months have you used drugs other than those required for medical reasons? No [de-identified] : Regular [de-identified] : None [(PHQ-2) Unable to screen] : PHQ-2: unable to screen

## 2020-04-30 ENCOUNTER — RX RENEWAL (OUTPATIENT)
Age: 60
End: 2020-04-30

## 2020-05-01 ENCOUNTER — RX RENEWAL (OUTPATIENT)
Age: 60
End: 2020-05-01

## 2020-05-04 ENCOUNTER — APPOINTMENT (OUTPATIENT)
Dept: INTERNAL MEDICINE | Facility: CLINIC | Age: 60
End: 2020-05-04
Payer: COMMERCIAL

## 2020-05-04 PROCEDURE — 99214 OFFICE O/P EST MOD 30 MIN: CPT | Mod: 95

## 2020-05-04 RX ORDER — AZITHROMYCIN 250 MG/1
250 TABLET, FILM COATED ORAL
Qty: 1 | Refills: 0 | Status: DISCONTINUED | COMMUNITY
Start: 2020-04-20 | End: 2020-05-04

## 2020-05-04 RX ORDER — IPRATROPIUM BROMIDE 0.5 MG/2.5ML
0.02 SOLUTION RESPIRATORY (INHALATION) 4 TIMES DAILY
Qty: 312.5 | Refills: 3 | Status: DISCONTINUED | COMMUNITY
Start: 2019-02-13 | End: 2020-05-04

## 2020-05-04 NOTE — PHYSICAL EXAM
[No Acute Distress] : no acute distress [Well Nourished] : well nourished [Well-Appearing] : well-appearing [Well Developed] : well developed [Normal Sclera/Conjunctiva] : normal sclera/conjunctiva [Normal Voice/Communication] : normal voice/communication [Normal Outer Ear/Nose] : the outer ears and nose were normal in appearance [No Respiratory Distress] : no respiratory distress  [No Edema] : there was no peripheral edema [No Accessory Muscle Use] : no accessory muscle use [No Rash] : no rash [No Extremity Clubbing/Cyanosis] : no extremity clubbing/cyanosis [Speech Grossly Normal] : speech grossly normal [Normal Affect] : the affect was normal [Alert and Oriented x3] : oriented to person, place, and time [de-identified] : Speaks without difficulty [de-identified] : Pursed lip breathing; respiratory rate= 20; no audible wheezing or cough noted

## 2020-05-04 NOTE — REVIEW OF SYSTEMS
[Fatigue] : fatigue [Vision Problems] : vision problems [Shortness Of Breath] : shortness of breath [Cough] : cough [Dyspnea on Exertion] : dyspnea on exertion [Anxiety] : anxiety [Negative] : Heme/Lymph [Hot Flashes] : hot flashes [Diarrhea] : diarrhea

## 2020-05-04 NOTE — HISTORY OF PRESENT ILLNESS
[Home] : at home, [unfilled] , at the time of the visit. [Medical Office: ([facility])___] : at the medical office located in  [Patient] : the patient [Self] : self [FreeTextEntry8] : The patient reports that her pulmonary status remains very poor.  She had severely labored breathing all weekend but did not call me as \par I advised because she knew that I would tell her to go to the ER and she refuses to do so. She feels very hot - she did not take her temperature. She used her BIPAP all weekend. She is using ceiling fans to circulate the air to help her breathing. Today she had diarrhea.\par She is taking all of her medications as advised.\par She denies increased lower extremity edema. She denies any calf pain.\par She is compliant with oxygen therapy and BiPAP therapy.  Her saturations have ranged from 90-95%.\par She denies any nausea/vomiting.  She denies decreased appetite.  She denies any loss of taste or smell.  She denies any fevers.  She has good urine output.  She denies any chest pain.  She does have a mild cough.  She denies any hemoptysis or purulent secretions.  Her breathing is very labored.  She denies any headache, sore throat, or nasal symptoms.\par She continues to have fatigue.  She denies any palpitations.

## 2020-05-04 NOTE — ASSESSMENT
[FreeTextEntry1] : Concerned that the patient may have COVID-19 infection triggering an exacerbation of her COPD.\par I again strongly advised her to immediately go to the emergency room by ambulance for evaluation and treatment.  She likely will require hospital admission.  She needs COVID-19 testing, labs, EKG, and pulmonary imaging. Need to exclude pneumonia, cardiac issues.\par She continues to adamantly refused to go to the emergency room tonight.  She states that she will call me tomorrow morning and that if her breathing is no better then she will consider going to the emergency room at that time.\par She will go to the emergency room if she develops worsening shortness of breath or any oxygen desaturation overnight.\par She will rest\par She was advised to turn on the air conditioning in her home\par A low-salt diet and lower extremity elevation and compression hose were advised\par She will continue diuretic therapy\par She will continue BiPAP therapy 24 hours/day\par She will continue oxygen therapy and closely monitor her oxygen saturations\par She will continue prednisone - she took 40 mg this evening\par She will continue nebulizer therapy\par She will continue all of her usual COPD medications\par She will continue Mucinex twice daily\par She will complete her course of antibiotic therapy\par She is already scheduled for a telehealth visit this week and as needed\par She will call for any medical/pulmonary issues\par She will call if her status worsens or does not continue to improve\par All of her questions were answered and she was reassured\par She verbally confirmed understanding of all of the above\par

## 2020-05-04 NOTE — HEALTH RISK ASSESSMENT
[No] : In the past 12 months have you used drugs other than those required for medical reasons? No [No falls in past year] : Patient reported no falls in the past year [(PHQ-2) Unable to screen] : PHQ-2: unable to screen [de-identified] : None [de-identified] : Regular [] : No

## 2020-05-07 ENCOUNTER — APPOINTMENT (OUTPATIENT)
Dept: INTERNAL MEDICINE | Facility: CLINIC | Age: 60
End: 2020-05-07
Payer: COMMERCIAL

## 2020-05-07 PROCEDURE — 99213 OFFICE O/P EST LOW 20 MIN: CPT | Mod: 95

## 2020-05-07 NOTE — REVIEW OF SYSTEMS
[Fatigue] : fatigue [Hot Flashes] : hot flashes [Cough] : cough [Vision Problems] : vision problems [Shortness Of Breath] : shortness of breath [Anxiety] : anxiety [Dyspnea on Exertion] : dyspnea on exertion [Negative] : Heme/Lymph

## 2020-05-07 NOTE — HEALTH RISK ASSESSMENT
[No] : No [No falls in past year] : Patient reported no falls in the past year [(PHQ-2) Unable to screen] : PHQ-2: unable to screen [] : No [de-identified] : None [de-identified] : Regular

## 2020-05-07 NOTE — ASSESSMENT
[FreeTextEntry1] : Concerned that the patient may have COVID-19 infection triggering an exacerbation of her COPD.\par I again strongly advised her to go to the emergency room by ambulance for evaluation and treatment. She needs COVID-19 testing, labs, EKG, and pulmonary imaging. Need to exclude pneumonia, cardiac issues.\par She continues to adamantly refuse to go to the emergency room.  She states that if her breathing is no better or worse then she will consider going to the emergency room at that time.\par She will go to the emergency room if she develops worsening shortness of breath or any oxygen desaturation.\par She will rest\par She was advised to turn on the air conditioning in her home\par A low-salt diet and lower extremity elevation and compression hose were advised\par She will continue diuretic therapy\par She will continue BiPAP therapy \par She will continue oxygen therapy and closely monitor her oxygen saturations\par She will continue prednisone taper\par She will continue nebulizer therapy\par She will continue all of her usual COPD medications\par She will continue Mucinex twice daily\par She will do a telehealth visit early next week and as needed\par She will call for any medical/pulmonary issues\par She will call if her status worsens or does not continue to improve\par All of her questions were answered and she was reassured\par She verbally confirmed understanding of all of the above\par

## 2020-05-07 NOTE — PHYSICAL EXAM
[No Acute Distress] : no acute distress [Well Nourished] : well nourished [Well Developed] : well developed [Normal Voice/Communication] : normal voice/communication [Well-Appearing] : well-appearing [Normal Sclera/Conjunctiva] : normal sclera/conjunctiva [Normal Outer Ear/Nose] : the outer ears and nose were normal in appearance [No Respiratory Distress] : no respiratory distress  [No Edema] : there was no peripheral edema [No Accessory Muscle Use] : no accessory muscle use [No Extremity Clubbing/Cyanosis] : no extremity clubbing/cyanosis [No Rash] : no rash [Speech Grossly Normal] : speech grossly normal [Normal Affect] : the affect was normal [Alert and Oriented x3] : oriented to person, place, and time [de-identified] : Speaks without difficulty or any dyspnea [de-identified] : Pursed lip breathing; respiratory rate= 20; no audible wheezing, stridor, or cough noted

## 2020-05-07 NOTE — HISTORY OF PRESENT ILLNESS
[FreeTextEntry8] : The patient had requested a follow-up telehealth visit this week because of her deterioration in her respiratory status.\par After our visit on May 4, 2020 the patient reports that she did take prednisone.  She did not call in follow-up as directed on May 5 as she felt there was nothing to discuss because she would not go to the emergency room as directed.  She reports that over the last 1 to 2 days she has noticed an improvement in her respiratory status.  She feels that her status is back to its poor baseline.  She denies any edema or calf pain.  Her appetite remains normal without any nausea/vomiting/diarrhea.  She denies any chest pain.  She denies any increased cough.  She has no hemoptysis.  Her breathing has improved.  She reports that her pulse rate remains normal and that her oxygen saturations are above 95%.  She denies any fevers.

## 2020-05-12 ENCOUNTER — APPOINTMENT (OUTPATIENT)
Dept: INTERNAL MEDICINE | Facility: CLINIC | Age: 60
End: 2020-05-12
Payer: COMMERCIAL

## 2020-05-12 PROCEDURE — 99213 OFFICE O/P EST LOW 20 MIN: CPT | Mod: 95

## 2020-05-12 NOTE — HISTORY OF PRESENT ILLNESS
[Home] : at home, [unfilled] , at the time of the visit. [Medical Office: ([facility])___] : at the medical office located in  [Patient] : the patient [FreeTextEntry8] : The patient reports that she is about the same. She states that she will not go to the emergency room. Her main issues remain severe fatigue and severe NIELSON.  She feels that her status is not yet back to its poor baseline.  She denies any edema or calf pain.  Her appetite remains normal without any nausea/vomiting/diarrhea.  She denies any chest pain.  She denies any increased cough.  She has no hemoptysis.  She reports that her pulse rate remains normal and that her oxygen saturations are above 95%.  She denies any fevers. [Self] : self

## 2020-05-12 NOTE — HEALTH RISK ASSESSMENT
[] : No [No] : No [No falls in past year] : Patient reported no falls in the past year [(PHQ-2) Unable to screen] : PHQ-2: unable to screen [de-identified] : None [de-identified] : Regular

## 2020-05-12 NOTE — REVIEW OF SYSTEMS
[Fatigue] : fatigue [Postnasal Drip] : postnasal drip [Vision Problems] : vision problems [Shortness Of Breath] : shortness of breath [Cough] : cough [Dyspnea on Exertion] : dyspnea on exertion [Anxiety] : anxiety [Negative] : Heme/Lymph

## 2020-05-12 NOTE — PHYSICAL EXAM
[No Acute Distress] : no acute distress [Well Nourished] : well nourished [Well Developed] : well developed [Well-Appearing] : well-appearing [Normal Voice/Communication] : normal voice/communication [Normal Sclera/Conjunctiva] : normal sclera/conjunctiva [Normal Outer Ear/Nose] : the outer ears and nose were normal in appearance [No Respiratory Distress] : no respiratory distress  [No Accessory Muscle Use] : no accessory muscle use [No Edema] : there was no peripheral edema [No Extremity Clubbing/Cyanosis] : no extremity clubbing/cyanosis [No Rash] : no rash [Speech Grossly Normal] : speech grossly normal [Alert and Oriented x3] : oriented to person, place, and time [Normal Affect] : the affect was normal [de-identified] : Speaks without difficulty or any dyspnea [de-identified] : Pursed lip breathing; respiratory rate= 20; no audible wheezing, stridor, or cough noted

## 2020-05-12 NOTE — ASSESSMENT
[FreeTextEntry1] : Concerned that the patient may have COVID-19 infection triggering an exacerbation of her COPD.\par I again strongly advised her to go to the emergency room by ambulance for evaluation and treatment. She needs COVID-19 testing, labs, EKG, and pulmonary imaging. Need to exclude pneumonia, cardiac issues.\par She continues to adamantly refuse to go to the emergency room.  She states that she and her family do not want her to go to any hospital. She would prefer to die at home rather than alone in the hospital. She knows that all that can be done on an outpatient basis is being done for her.\par She will go to the emergency room if she develops worsening shortness of breath or any oxygen desaturation.\par She will rest.\par A low-salt diet and lower extremity elevation and compression hose were advised.\par She will continue diuretic therapy.\par She will continue BiPAP therapy.\par She will continue oxygen therapy and closely monitor her oxygen saturations.\par She will continue prednisone taper.\par She will continue nebulizer therapy.\par She will continue all of her usual COPD medications.\par She will continue Mucinex twice daily as needed.\par Saline nasal rinses were advised.\par She will do a telehealth visit early next week and as needed.\par She will call for any medical/pulmonary issues.\par She will call if her status worsens or does not continue to improve.\par All of her questions were answered and she was reassured.\par She verbally confirmed understanding of all of the above.\par A home blood draw for Covid 19 antibody testing and full routine labs will be arranged.\par RX renewed.

## 2020-05-20 ENCOUNTER — APPOINTMENT (OUTPATIENT)
Dept: INTERNAL MEDICINE | Facility: CLINIC | Age: 60
End: 2020-05-20
Payer: COMMERCIAL

## 2020-05-20 ENCOUNTER — APPOINTMENT (OUTPATIENT)
Dept: INTERNAL MEDICINE | Facility: CLINIC | Age: 60
End: 2020-05-20

## 2020-05-20 DIAGNOSIS — Z20.828 CONTACT WITH AND (SUSPECTED) EXPOSURE TO OTHER VIRAL COMMUNICABLE DISEASES: ICD-10-CM

## 2020-05-20 DIAGNOSIS — R06.02 SHORTNESS OF BREATH: ICD-10-CM

## 2020-05-20 PROCEDURE — 99213 OFFICE O/P EST LOW 20 MIN: CPT | Mod: 95

## 2020-05-21 PROBLEM — R06.02 SOB (SHORTNESS OF BREATH): Status: ACTIVE | Noted: 2020-05-04

## 2020-05-21 PROBLEM — Z20.828 SUSPECTED COVID-19 VIRUS INFECTION: Status: ACTIVE | Noted: 2020-05-04

## 2020-05-21 NOTE — ASSESSMENT
[FreeTextEntry1] : LE edema\par ? just dependent edema/venous insufficiency due to constant sitting at her desk each day\par ? related to cor pulmonale / CHF\par ? DVT\par Low salt diet\par Compression stockings\par Elevate legs at all times\par Double Lasix for 3 days\par She declined Dopplers for now\par \par Concerned that the patient may have COVID-19 infection triggering an exacerbation of her COPD.\par She needs COVID-19 testing, labs, EKG, and pulmonary imaging. Need to exclude pneumonia, cardiac issues.\par She continues to adamantly refuse to go to the emergency room.  She states that she and her family do not want her to go to any hospital. She would prefer to die at home rather than alone in the hospital. She states that she knows that all that can be done on an outpatient basis is being done for her.\par She states that she will go to the emergency room if she develops worsening shortness of breath or any oxygen desaturation.\par She will rest.\par She will continue BiPAP therapy.\par She will continue oxygen therapy and closely monitor her oxygen saturations.\par She will continue prednisone taper.\par She will continue nebulizer therapy.\par She will continue all of her usual COPD medications.\par She will continue Mucinex twice daily as needed.\par Saline nasal rinses were advised.\par She will do a telehealth visit early next week and as needed.\par She will call for any medical/pulmonary issues.\par She will call if her status worsens or does not continue to improve.\par All of her questions were answered \par She verbally confirmed understanding of all of the above.\par A home blood draw for Covid 19 antibody testing and full routine labs was ordered last week - will f/u to make certain that it gets completed\par Will try to arrange home EKG/CXR/dopplers if possible

## 2020-05-21 NOTE — PHYSICAL EXAM
[No Acute Distress] : no acute distress [Well Nourished] : well nourished [Well Developed] : well developed [Well-Appearing] : well-appearing [Normal Voice/Communication] : normal voice/communication [Normal Sclera/Conjunctiva] : normal sclera/conjunctiva [Normal Outer Ear/Nose] : the outer ears and nose were normal in appearance [No Respiratory Distress] : no respiratory distress  [No Accessory Muscle Use] : no accessory muscle use [No Extremity Clubbing/Cyanosis] : no extremity clubbing/cyanosis [No Rash] : no rash [Speech Grossly Normal] : speech grossly normal [Normal Affect] : the affect was normal [Alert and Oriented x3] : oriented to person, place, and time [de-identified] : Speaks without difficulty or any dyspnea [de-identified] : Pursed lip breathing; respiratory rate= 20; no audible wheezing, stridor, or cough noted  [de-identified] : mild /l pedal edema

## 2020-05-21 NOTE — HISTORY OF PRESENT ILLNESS
[Home] : at home, [unfilled] , at the time of the visit. [Medical Office: ([facility])___] : at the medical office located in  [Patient] : the patient [Self] : self [FreeTextEntry8] : The patient reports that she is about the same. She states that they did not come to do her lab work- advised her that Covid 19 antibody testing was ordered. Her main complaint this week is that she develops progressive edema during the day. Usually it resolves with leg elevation. However, lately mild edema seems to persist.  She denies any calf pain.  Her appetite remains normal without any nausea/vomiting/diarrhea.  She denies any chest pain.  She denies any increased cough.  She has no hemoptysis.  She reports that her pulse rate remains normal and that her oxygen saturations are above 95%.  She denies any fevers. She reports normal urination.\par She denies any orthopnea or PND. She sleeps well with AC/fan and BIPAP.

## 2020-05-21 NOTE — HEALTH RISK ASSESSMENT
[No] : In the past 12 months have you used drugs other than those required for medical reasons? No [No falls in past year] : Patient reported no falls in the past year [(PHQ-2) Unable to screen] : PHQ-2: unable to screen [de-identified] : Regular [] : No [de-identified] : None

## 2020-05-21 NOTE — REVIEW OF SYSTEMS
[Fatigue] : fatigue [Vision Problems] : vision problems [Postnasal Drip] : postnasal drip [Shortness Of Breath] : shortness of breath [Cough] : cough [Dyspnea on Exertion] : dyspnea on exertion [Anxiety] : anxiety [Negative] : Heme/Lymph [Lower Ext Edema] : lower extremity edema

## 2020-05-31 ENCOUNTER — RX RENEWAL (OUTPATIENT)
Age: 60
End: 2020-05-31

## 2020-06-01 LAB
25(OH)D3 SERPL-MCNC: 57.6 NG/ML
ALBUMIN SERPL ELPH-MCNC: 4.6 G/DL
ALP BLD-CCNC: 66 U/L
ALT SERPL-CCNC: 23 U/L
ANION GAP SERPL CALC-SCNC: 16 MMOL/L
AST SERPL-CCNC: 22 U/L
BASOPHILS # BLD AUTO: 0.01 K/UL
BASOPHILS NFR BLD AUTO: 0.1 %
BILIRUB SERPL-MCNC: 0.4 MG/DL
BUN SERPL-MCNC: 31 MG/DL
CALCIUM SERPL-MCNC: 10 MG/DL
CHLORIDE SERPL-SCNC: 90 MMOL/L
CHOLEST SERPL-MCNC: 176 MG/DL
CHOLEST/HDLC SERPL: 1.9 RATIO
CO2 SERPL-SCNC: 31 MMOL/L
CREAT SERPL-MCNC: 1.27 MG/DL
EOSINOPHIL # BLD AUTO: 0 K/UL
EOSINOPHIL NFR BLD AUTO: 0 %
ESTIMATED AVERAGE GLUCOSE: 206 MG/DL
FOLATE SERPL-MCNC: >20 NG/ML
GLUCOSE SERPL-MCNC: 351 MG/DL
HBA1C MFR BLD HPLC: 8.8 %
HCT VFR BLD CALC: 39.4 %
HDLC SERPL-MCNC: 92 MG/DL
HGB BLD-MCNC: 12 G/DL
IMM GRANULOCYTES NFR BLD AUTO: 0.9 %
IRON SERPL-MCNC: 55 UG/DL
LDLC SERPL CALC-MCNC: 66 MG/DL
LYMPHOCYTES # BLD AUTO: 0.5 K/UL
LYMPHOCYTES NFR BLD AUTO: 6.5 %
MAN DIFF?: NORMAL
MCHC RBC-ENTMCNC: 26.4 PG
MCHC RBC-ENTMCNC: 30.5 GM/DL
MCV RBC AUTO: 86.6 FL
MONOCYTES # BLD AUTO: 0.3 K/UL
MONOCYTES NFR BLD AUTO: 3.9 %
NEUTROPHILS # BLD AUTO: 6.78 K/UL
NEUTROPHILS NFR BLD AUTO: 88.6 %
PLATELET # BLD AUTO: 256 K/UL
POTASSIUM SERPL-SCNC: 4.9 MMOL/L
PROT SERPL-MCNC: 6.6 G/DL
RBC # BLD: 4.55 M/UL
RBC # FLD: 15.9 %
SARS-COV-2 IGG SERPL IA-ACNC: <0.1 INDEX
SARS-COV-2 IGG SERPL QL IA: NEGATIVE
SODIUM SERPL-SCNC: 138 MMOL/L
TRIGL SERPL-MCNC: 92 MG/DL
TSH SERPL-ACNC: 0.58 UIU/ML
VIT B12 SERPL-MCNC: 962 PG/ML
WBC # FLD AUTO: 7.66 K/UL

## 2020-06-02 ENCOUNTER — APPOINTMENT (OUTPATIENT)
Dept: INTERNAL MEDICINE | Facility: CLINIC | Age: 60
End: 2020-06-02
Payer: COMMERCIAL

## 2020-06-02 PROCEDURE — 99213 OFFICE O/P EST LOW 20 MIN: CPT | Mod: 95

## 2020-06-03 RX ORDER — CEFUROXIME AXETIL 500 MG/1
500 TABLET ORAL
Qty: 20 | Refills: 0 | Status: DISCONTINUED | COMMUNITY
Start: 2020-04-21 | End: 2020-06-03

## 2020-06-03 NOTE — HISTORY OF PRESENT ILLNESS
[Home] : at home, [unfilled] , at the time of the visit. [Medical Office: ([facility])___] : at the medical office located in  [Patient] : the patient [Self] : self [FreeTextEntry8] : The patient reports that she is about the same. She states that she thinks that her sugars are high as she has been overeating fruit. Also likely due to continued use of steroids with underdosing of her Metformin. Her lower extremity edema is gone. She denies any calf pain.  Her appetite remains normal without any nausea/vomiting/diarrhea.  She denies any chest pain.  She denies any increased cough.  She has no hemoptysis.  She reports that her pulse rate remains normal and that her oxygen saturations are above 95%.  She denies any fevers. She reports normal urination.\par She denies any orthopnea or PND. She sleeps well with AC/fan and BIPAP.

## 2020-06-03 NOTE — ASSESSMENT
[FreeTextEntry1] : LE edema\par ? just dependent edema/venous insufficiency due to constant sitting at her desk each day\par ? related to cor pulmonale / CHF\par ? DVT\par Clinically improved with increased Lasix dosing\par Low salt diet\par Compression stockings\par Elevate legs at all times\par She declined Dopplers for now\par \par Exacerbation of COPD\par She needs EKG, chest imaging. Need to exclude pneumonia, cardiac issues.\par She continues to adamantly refuse to go to the emergency room.  She states that she and her family do not want her to go to any hospital. She would prefer to die at home rather than alone in the hospital. She states that she knows that all that can be done on an outpatient basis is being done for her.\par She states that she will go to the emergency room if she develops worsening shortness of breath or any oxygen desaturation.\par Covid 19 antibody testing was negative\par She declines to go to [facility] for Covid PCR testing\par Advised f/u with Dr. Chapa - can do TEB\par She will rest.\par She will continue BiPAP therapy.\par She will continue oxygen therapy and closely monitor her oxygen saturations.\par She will continue prednisone taper.\par She will continue nebulizer therapy.\par She will continue all of her usual COPD medications.\par She will continue Mucinex twice daily as needed.\par Saline nasal rinses were advised.\par \par Diabetes out of control\par In setting of Prednisone use, underdosing of her Metformin and dietary indiscretion\par ADA diet\par Aggressive weight control\par Metformin 1000 mg bid\par On Prednisone taper\par Will redo BMP/HGBA1C\par See endocrine\par F/U ophtho, podiatry\par FSG's declined\par \par She will do a telehealth visit early next week and as needed.\par She will call for any medical/pulmonary issues.\par She will call if her status worsens or does not continue to improve.\par All of her questions were answered \par She verbally confirmed understanding of all of the above.\par Will try to arrange home EKG/CXR if possible

## 2020-06-03 NOTE — HEALTH RISK ASSESSMENT
[No] : In the past 12 months have you used drugs other than those required for medical reasons? No [No falls in past year] : Patient reported no falls in the past year [(PHQ-2) Unable to screen] : PHQ-2: unable to screen [] : No [de-identified] : None [de-identified] : Regular

## 2020-06-03 NOTE — REVIEW OF SYSTEMS
[Fatigue] : fatigue [Vision Problems] : vision problems [Postnasal Drip] : postnasal drip [Lower Ext Edema] : lower extremity edema [Shortness Of Breath] : shortness of breath [Cough] : cough [Dyspnea on Exertion] : dyspnea on exertion [Anxiety] : anxiety [Negative] : Heme/Lymph [Easy Bruising] : easy bruising

## 2020-06-11 LAB
ANION GAP SERPL CALC-SCNC: 14 MMOL/L
BUN SERPL-MCNC: 26 MG/DL
CALCIUM SERPL-MCNC: 9.7 MG/DL
CHLORIDE SERPL-SCNC: 91 MMOL/L
CO2 SERPL-SCNC: 35 MMOL/L
CREAT SERPL-MCNC: 1.17 MG/DL
ESTIMATED AVERAGE GLUCOSE: 206 MG/DL
GLUCOSE SERPL-MCNC: 131 MG/DL
HBA1C MFR BLD HPLC: 8.8 %
POTASSIUM SERPL-SCNC: 4.2 MMOL/L
SODIUM SERPL-SCNC: 140 MMOL/L

## 2020-06-17 ENCOUNTER — APPOINTMENT (OUTPATIENT)
Dept: INTERNAL MEDICINE | Facility: CLINIC | Age: 60
End: 2020-06-17
Payer: COMMERCIAL

## 2020-06-17 PROCEDURE — 99213 OFFICE O/P EST LOW 20 MIN: CPT | Mod: 95

## 2020-06-18 NOTE — PHYSICAL EXAM
[No Acute Distress] : no acute distress [Well Developed] : well developed [Well Nourished] : well nourished [Normal Voice/Communication] : normal voice/communication [Normal Sclera/Conjunctiva] : normal sclera/conjunctiva [Normal Outer Ear/Nose] : the outer ears and nose were normal in appearance [Well-Appearing] : well-appearing [No Accessory Muscle Use] : no accessory muscle use [No Edema] : there was no peripheral edema [No Respiratory Distress] : no respiratory distress  [Normal Affect] : the affect was normal [Speech Grossly Normal] : speech grossly normal [No Rash] : no rash [No Extremity Clubbing/Cyanosis] : no extremity clubbing/cyanosis [de-identified] : Pursed lip breathing; respiratory rate= 20; no audible wheezing, stridor, or cough noted  [de-identified] : Speaks without difficulty or any dyspnea [Alert and Oriented x3] : oriented to person, place, and time

## 2020-06-18 NOTE — HEALTH RISK ASSESSMENT
[] : No [No] : In the past 12 months have you used drugs other than those required for medical reasons? No [No falls in past year] : Patient reported no falls in the past year [(PHQ-2) Unable to screen] : PHQ-2: unable to screen [de-identified] : Regular [de-identified] : None

## 2020-06-18 NOTE — REVIEW OF SYSTEMS
[Postnasal Drip] : postnasal drip [Fatigue] : fatigue [Vision Problems] : vision problems [Shortness Of Breath] : shortness of breath [Lower Ext Edema] : lower extremity edema [Dyspnea on Exertion] : dyspnea on exertion [Easy Bruising] : easy bruising [Anxiety] : anxiety [Negative] : Psychiatric

## 2020-06-18 NOTE — ASSESSMENT
[FreeTextEntry1] : LE edema\par ? just dependent edema/venous insufficiency due to constant sitting at her desk each day\par ? related to cor pulmonale / CHF\par ? DVT\par Clinically improved \par Low salt diet\par Compression stockings\par Elevate legs at all times\par She declined Dopplers for now\par \par ?Exacerbation of COPD/emphysema\par She needs EKG, chest imaging. Need to exclude pneumonia, cardiac issues.\par She continues to adamantly refuse to go to the emergency room.  She states that she and her family do not want her to go to any hospital. She would prefer to die at home rather than alone in the hospital. She states that she knows that all that can be done on an outpatient basis is being done for her.\par She states that she will go to the emergency room if she develops worsening shortness of breath or any oxygen desaturation.\par She will call Dr. Cervantes at [facility]\par Covid 19 antibody testing was negative\par She declines to go to [facility] for Covid PCR testing\par Advised f/u with Dr. Chapa - can do TEB\par She will rest.\par She will continue BiPAP therapy.\par She will continue oxygen therapy and closely monitor her oxygen saturations.\par She will continue nebulizer therapy.\par She will continue all of her usual COPD medications.\par She will continue Mucinex twice daily as needed.\par Saline nasal rinses were advised.\par \par Diabetes out of control\par In setting of Prednisone use, underdosing of her Metformin and dietary indiscretion\par ADA diet\par Aggressive weight control\par Off Prednisone\par Metformin 1000 mg bid\par Will redo BMP/HGBA1C\par See endocrine\par F/U ophtho, podiatry\par FSG's declined\par \par She will do a telehealth visit early next week and as needed.\par She will call for any medical/pulmonary issues.\par She will call if her status worsens or does not continue to improve.\par All of her questions were answered \par She verbally confirmed understanding of all of the above.\par Will try to arrange home EKG/CXR if possible

## 2020-06-18 NOTE — HISTORY OF PRESENT ILLNESS
[Home] : at home, [unfilled] , at the time of the visit. [Medical Office: ([facility])___] : at the medical office located in  [FreeTextEntry8] : The patient reports that she was very ill over the weekend. She was still so ill on Monday and notes that she was so weak/fatigued that she was unable to work. Her breathing was bad and she needed to use her BIPAP at all times (only removing it when she felt claustrophobic). She had no appetite and barely ate/drank. She was weak and fatigued and was unable to leave her bed.Only got up to use the bathroom. She started to feel better yesterday and continues to improve today. Her lower extremity edema is intermittent. She denies any calf pain.  Her appetite is better and she is eating and drinking. She denies abdominal pain and is without any nausea/vomiting/diarrhea.  She denies any chest pain.  She denies any increased cough.  She has no hemoptysis.  She reports that her pulse rate remains normal and that her oxygen saturations are above 95%.  She denies any fevers. She reports normal urination. She denies any orthopnea or PND. She sleeps well with AC/fan and BIPAP. She denies body aches or loss of taste/smell.\par She did not call in for fear that she would be told to call ambulance and go to hospital. [Patient] : the patient [Self] : self

## 2020-06-23 ENCOUNTER — RX RENEWAL (OUTPATIENT)
Age: 60
End: 2020-06-23

## 2020-06-24 ENCOUNTER — APPOINTMENT (OUTPATIENT)
Dept: INTERNAL MEDICINE | Facility: CLINIC | Age: 60
End: 2020-06-24
Payer: COMMERCIAL

## 2020-06-24 PROCEDURE — 99213 OFFICE O/P EST LOW 20 MIN: CPT | Mod: 95

## 2020-06-24 NOTE — PHYSICAL EXAM
[No Acute Distress] : no acute distress [Well Developed] : well developed [Well Nourished] : well nourished [Well-Appearing] : well-appearing [Normal Sclera/Conjunctiva] : normal sclera/conjunctiva [Normal Voice/Communication] : normal voice/communication [No Accessory Muscle Use] : no accessory muscle use [No Respiratory Distress] : no respiratory distress  [Normal Outer Ear/Nose] : the outer ears and nose were normal in appearance [No Rash] : no rash [No Extremity Clubbing/Cyanosis] : no extremity clubbing/cyanosis [No Edema] : there was no peripheral edema [Speech Grossly Normal] : speech grossly normal [Normal Affect] : the affect was normal [Alert and Oriented x3] : oriented to person, place, and time [de-identified] : Speaks without difficulty or any dyspnea [de-identified] : Pursed lip breathing; respiratory rate= 20; no audible wheezing, stridor, or cough noted

## 2020-06-24 NOTE — REVIEW OF SYSTEMS
[Postnasal Drip] : postnasal drip [Vision Problems] : vision problems [Fatigue] : fatigue [Lower Ext Edema] : lower extremity edema [Shortness Of Breath] : shortness of breath [Dyspnea on Exertion] : dyspnea on exertion [Easy Bruising] : easy bruising [Anxiety] : anxiety [Negative] : Psychiatric

## 2020-06-24 NOTE — ASSESSMENT
[FreeTextEntry1] : LE edema\par ? just dependent edema/venous insufficiency due to constant sitting at her desk each day\par ? related to cor pulmonale / CHF\par ? DVT\par Clinically improved \par Low salt diet\par Compression stockings\par Elevate legs at all times\par She declined Dopplers for now\par \par End stage COPD/emphysema - being evaluated for lung transplant\par She needs EKG, chest imaging (chest CT). Need to exclude pneumonia, cardiac issues.\par She continues to adamantly refuse to go to the emergency room.  She states that she and her family do not want her to go to any hospital. She would prefer to die at home rather than alone in the hospital. She does not want to leave her home to come here to the office for a visit. She states that she knows that all that can be done on an outpatient basis is being done for her.\par She states that she will go to the emergency room if she develops worsening shortness of breath or any oxygen desaturation.\par F/U with Dr. Cervantes at [facility] planned for 7/2020\par She declines to go to [facility] for Covid PCR testing\par Advised f/u with Dr. Chapa - can do TEB\par She will rest.\par She will continue BiPAP therapy.\par She will continue oxygen therapy and closely monitor her oxygen saturations.\par She will continue nebulizer therapy.\par She will continue all of her usual COPD medications.\par She will continue Mucinex twice daily as needed.\par Saline nasal rinses were advised.\par \par Diabetes out of control\par In setting of Prednisone use, underdosing of her Metformin and dietary indiscretion\par ADA diet\par Aggressive weight control\par Off Prednisone\par Metformin 1000 mg bid\par Will redo BMP/HGBA1C\par See endocrine\par F/U ophtho, podiatry\par FSG's declined\par \par She wishes to do a telehealth visit early next week and as needed.\par She will call for any medical/pulmonary issues.\par She will call if her status worsens or does not continue to improve.\par All of her questions were answered \par She verbally confirmed understanding of all of the above.

## 2020-06-24 NOTE — HEALTH RISK ASSESSMENT
[No] : No [No falls in past year] : Patient reported no falls in the past year [(PHQ-2) Unable to screen] : PHQ-2: unable to screen [de-identified] : None [] : No [de-identified] : Regular

## 2020-06-24 NOTE — HISTORY OF PRESENT ILLNESS
[Medical Office: ([facility])___] : at the medical office located in  [Home] : at home, [unfilled] , at the time of the visit. [Patient] : the patient [Self] : self [Family Member] : family member [FreeTextEntry8] : The patient reports that her breathing remains very labored. She has a f/u appointment with [facility] = Dr. Cervantes on 7/20/20. Her lower extremity edema is improved. She denies any calf pain.  Her appetite is stable - she is trying to diet. She denies abdominal pain and is without any nausea/vomiting/diarrhea. Her urination and BM's are normal.  She denies any chest pain.  She denies any increased cough.  She has no hemoptysis.  She reports that her pulse rate remains normal and that her oxygen saturations are above 95%.  She denies any fevers.She denies any orthopnea or PND. She sleeps well with AC/fan and BIPAP. She denies body aches or loss of taste/smell.\par

## 2020-06-30 RX ORDER — AMOXICILLIN AND CLAVULANATE POTASSIUM 875; 125 MG/1; MG/1
875-125 TABLET, COATED ORAL TWICE DAILY
Qty: 20 | Refills: 0 | Status: COMPLETED | COMMUNITY
Start: 2020-06-30 | End: 2020-07-10

## 2020-07-01 ENCOUNTER — NON-APPOINTMENT (OUTPATIENT)
Age: 60
End: 2020-07-01

## 2020-07-01 ENCOUNTER — LABORATORY RESULT (OUTPATIENT)
Age: 60
End: 2020-07-01

## 2020-07-01 ENCOUNTER — APPOINTMENT (OUTPATIENT)
Dept: INTERNAL MEDICINE | Facility: CLINIC | Age: 60
End: 2020-07-01
Payer: COMMERCIAL

## 2020-07-01 VITALS
WEIGHT: 173 LBS | BODY MASS INDEX: 29.7 KG/M2 | DIASTOLIC BLOOD PRESSURE: 80 MMHG | TEMPERATURE: 98.3 F | OXYGEN SATURATION: 100 % | HEART RATE: 109 BPM | SYSTOLIC BLOOD PRESSURE: 160 MMHG

## 2020-07-01 DIAGNOSIS — Z00.00 ENCOUNTER FOR GENERAL ADULT MEDICAL EXAMINATION W/OUT ABNORMAL FINDINGS: ICD-10-CM

## 2020-07-01 PROCEDURE — 99214 OFFICE O/P EST MOD 30 MIN: CPT | Mod: 25

## 2020-07-01 PROCEDURE — 93000 ELECTROCARDIOGRAM COMPLETE: CPT

## 2020-07-01 PROCEDURE — 36415 COLL VENOUS BLD VENIPUNCTURE: CPT

## 2020-07-02 NOTE — HEALTH RISK ASSESSMENT
[] : No [No] : In the past 12 months have you used drugs other than those required for medical reasons? No [No falls in past year] : Patient reported no falls in the past year [(PHQ-2) Unable to screen] : PHQ-2: unable to screen [de-identified] : None [de-identified] : Regular

## 2020-07-02 NOTE — REVIEW OF SYSTEMS
[Fatigue] : fatigue [Recent Change In Weight] : ~T recent weight change [Vision Problems] : vision problems [Postnasal Drip] : postnasal drip [Lower Ext Edema] : lower extremity edema [Shortness Of Breath] : shortness of breath [Cough] : cough [Dyspnea on Exertion] : dyspnea on exertion [Anxiety] : anxiety [Easy Bruising] : easy bruising [Negative] : Psychiatric

## 2020-07-02 NOTE — HISTORY OF PRESENT ILLNESS
[Family Member] : family member [FreeTextEntry8] : Comes in for acute visit.\par Spoke with her last evening and started her on Augmentin and prednisone 60 mg.\par She reports that she is feeling better today.  She feels as if her labored breathing settled at 2:30 AM today and she was able to sleep for 5 hours.\par She has lower extremity edema.  She denies any calf pain.\par She denies any fevers.  She reports normal urination and bowel movements.  She denies any abdominal pain or any nausea or vomiting.\par She denies any chest pain.  She denies any severe palpitations.  She remains dyspneic on exertion.  She continues to have a cough with sputum production.

## 2020-07-02 NOTE — ASSESSMENT
[FreeTextEntry1] : Patient has end-stage emphysema/she is awaiting lung transplantation\par She likely is having an acute exacerbation of her COPD\par She will continue BiPAP therapy\par She will continue oxygen 3 L/min 24 hours/day\par She will continue Symbicort, Spiriva, Singulair, and theophylline\par She will continue nebulizer therapy every 3-4 hours\par She will continue prednisone 40 mg daily with a slow taper\par She will use Mucinex twice daily\par She will continue daily Zithromax\par She will take a 5 to 7-day course of Augmentin\par She was reminded to do a repeat chest CT= prescription was given\par We will monitor her PFTs\par She will need a flu shot in the fall\par She no longer smokes\par She declines pulmonary rehab\par Continued weight loss was advised\par She will follow-up with cardiology\par She will continue daily Lasix\par As per cardiology she will add metolazone 2.5 mg 3 times weekly\par Full labs were sent today\par See scanned EKG/reviewed with Dr. Chapa via phone/results discussed with patient\par She will return in follow-up in 1 to 2 weeks and as needed\par She will call if her status worsens or does not improve\par She will call for any medical or pulmonary issues\par All of her questions were answered\par She and her brother verbally confirmed understanding of all of the above\par She was also given a prescription to do repeat mammogram and breast ultrasound for which she is overdue

## 2020-07-02 NOTE — PHYSICAL EXAM
[No Acute Distress] : no acute distress [Well Nourished] : well nourished [Well Developed] : well developed [Well-Appearing] : well-appearing [Normal Sclera/Conjunctiva] : normal sclera/conjunctiva [Normal Voice/Communication] : normal voice/communication [Normal Outer Ear/Nose] : the outer ears and nose were normal in appearance [EOMI] : extraocular movements intact [PERRL] : pupils equal round and reactive to light [No JVD] : no jugular venous distention [Normal TMs] : both tympanic membranes were normal [Supple] : supple [Clear to Auscultation] : lungs were clear to auscultation bilaterally [No Accessory Muscle Use] : no accessory muscle use [No Respiratory Distress] : no respiratory distress  [Normal S1, S2] : normal S1 and S2 [Regular Rhythm] : with a regular rhythm [No Carotid Bruits] : no carotid bruits [Pedal Pulses Present] : the pedal pulses are present [Soft] : abdomen soft [No Extremity Clubbing/Cyanosis] : no extremity clubbing/cyanosis [No Spinal Tenderness] : no spinal tenderness [Normal Bowel Sounds] : normal bowel sounds [No CVA Tenderness] : no CVA  tenderness [No Focal Deficits] : no focal deficits [No Rash] : no rash [Speech Grossly Normal] : speech grossly normal [Alert and Oriented x3] : oriented to person, place, and time [Normal Affect] : the affect was normal [de-identified] : No sinus tenderness; wearing full facemask [de-identified] : Speaks without difficulty or any dyspnea [de-identified] : No stridor [de-identified] : Pursed lip breathing; respiratory rate= 20; very diminished air entry; no wheezing, no cough noted [de-identified] : Left greater than right edema; no cords [de-identified] : Tachycardic

## 2020-07-03 ENCOUNTER — RX CHANGE (OUTPATIENT)
Age: 60
End: 2020-07-03

## 2020-07-03 LAB
25(OH)D3 SERPL-MCNC: 59.8 NG/ML
ALBUMIN SERPL ELPH-MCNC: 4.8 G/DL
ALP BLD-CCNC: 72 U/L
ALT SERPL-CCNC: 23 U/L
ANION GAP SERPL CALC-SCNC: 18 MMOL/L
AST SERPL-CCNC: 29 U/L
BASOPHILS # BLD AUTO: 0 K/UL
BASOPHILS NFR BLD AUTO: 0 %
BILIRUB SERPL-MCNC: 0.4 MG/DL
BUN SERPL-MCNC: 18 MG/DL
CALCIUM SERPL-MCNC: 10.2 MG/DL
CHLORIDE SERPL-SCNC: 84 MMOL/L
CHOLEST SERPL-MCNC: 179 MG/DL
CHOLEST/HDLC SERPL: 2 RATIO
CO2 SERPL-SCNC: 36 MMOL/L
CREAT SERPL-MCNC: 1.16 MG/DL
EOSINOPHIL # BLD AUTO: 0 K/UL
EOSINOPHIL NFR BLD AUTO: 0 %
ESTIMATED AVERAGE GLUCOSE: 177 MG/DL
FOLATE SERPL-MCNC: >20 NG/ML
GLUCOSE SERPL-MCNC: 178 MG/DL
HBA1C MFR BLD HPLC: 7.8 %
HCT VFR BLD CALC: 40.4 %
HDLC SERPL-MCNC: 89 MG/DL
HGB BLD-MCNC: 12.5 G/DL
IRON SERPL-MCNC: 51 UG/DL
LDLC SERPL CALC-MCNC: 74 MG/DL
LYMPHOCYTES # BLD AUTO: 0.48 K/UL
LYMPHOCYTES NFR BLD AUTO: 7.8 %
MAN DIFF?: NORMAL
MCHC RBC-ENTMCNC: 26.7 PG
MCHC RBC-ENTMCNC: 30.9 GM/DL
MCV RBC AUTO: 86.3 FL
MONOCYTES # BLD AUTO: 0.16 K/UL
MONOCYTES NFR BLD AUTO: 2.6 %
NEUTROPHILS # BLD AUTO: 5.55 K/UL
NEUTROPHILS NFR BLD AUTO: 87 %
PLATELET # BLD AUTO: 332 K/UL
POTASSIUM SERPL-SCNC: 4.7 MMOL/L
PROT SERPL-MCNC: 7 G/DL
RBC # BLD: 4.68 M/UL
RBC # FLD: 16.7 %
SODIUM SERPL-SCNC: 138 MMOL/L
TRIGL SERPL-MCNC: 81 MG/DL
TSH SERPL-ACNC: 0.97 UIU/ML
VIT B12 SERPL-MCNC: 1390 PG/ML
WBC # FLD AUTO: 6.19 K/UL

## 2020-07-06 RX ORDER — SITAGLIPTIN 50 MG/1
50 TABLET, FILM COATED ORAL
Qty: 30 | Refills: 0 | Status: DISCONTINUED | COMMUNITY
Start: 2020-07-03 | End: 2020-07-06

## 2020-07-09 ENCOUNTER — RX RENEWAL (OUTPATIENT)
Age: 60
End: 2020-07-09

## 2020-07-16 ENCOUNTER — RX RENEWAL (OUTPATIENT)
Age: 60
End: 2020-07-16

## 2020-07-23 ENCOUNTER — RX CHANGE (OUTPATIENT)
Age: 60
End: 2020-07-23

## 2020-07-23 ENCOUNTER — APPOINTMENT (OUTPATIENT)
Dept: INTERNAL MEDICINE | Facility: CLINIC | Age: 60
End: 2020-07-23
Payer: COMMERCIAL

## 2020-07-23 VITALS
HEART RATE: 77 BPM | TEMPERATURE: 97.2 F | BODY MASS INDEX: 28.49 KG/M2 | WEIGHT: 171 LBS | SYSTOLIC BLOOD PRESSURE: 120 MMHG | DIASTOLIC BLOOD PRESSURE: 70 MMHG | OXYGEN SATURATION: 100 % | HEIGHT: 65 IN

## 2020-07-23 DIAGNOSIS — R29.898 OTHER SYMPTOMS AND SIGNS INVOLVING THE MUSCULOSKELETAL SYSTEM: ICD-10-CM

## 2020-07-23 DIAGNOSIS — R93.89 ABNORMAL FINDINGS ON DIAGNOSTIC IMAGING OF OTHER SPECIFIED BODY STRUCTURES: ICD-10-CM

## 2020-07-23 PROCEDURE — 99214 OFFICE O/P EST MOD 30 MIN: CPT

## 2020-07-24 PROBLEM — R93.89 ABNORMAL CHEST CT: Status: ACTIVE | Noted: 2020-02-19

## 2020-07-24 PROBLEM — R29.898 LEG WEAKNESS, BILATERAL: Status: ACTIVE | Noted: 2020-07-24

## 2020-07-24 NOTE — REVIEW OF SYSTEMS
[Fatigue] : fatigue [Recent Change In Weight] : ~T recent weight change [Vision Problems] : vision problems [Lower Ext Edema] : lower extremity edema [Postnasal Drip] : postnasal drip [Cough] : cough [Dyspnea on Exertion] : dyspnea on exertion [Shortness Of Breath] : shortness of breath [Easy Bruising] : easy bruising [Anxiety] : anxiety [Negative] : Psychiatric [Muscle Weakness] : muscle weakness [Unsteady Walking] : ataxia [Muscle Pain] : muscle pain

## 2020-07-24 NOTE — HISTORY OF PRESENT ILLNESS
[Family Member] : family member [FreeTextEntry8] : Comes in for acute visit.\par Doing about the same.\par Saw Dr. Cervantes. He is moving ahead with testing for planned lung transplant. Weight down 18 pounds with dieting. Appetite reduced.\par She complains that her legs are weak and that she has pain and difficulty walking.\par She has improved lower extremity edema.  She denies any calf pain.\par She denies any fevers.  She reports normal urination and bowel movements.  She denies any abdominal pain or any nausea or vomiting.\par She denies any chest pain.  She denies any severe palpitations.  She remains dyspneic on minimal exertion.  She continues to have a cough with sputum production.

## 2020-07-24 NOTE — ASSESSMENT
[FreeTextEntry1] : Leg pain and weakness\par ?steroid myopathy\par ? radiculopathy\par To see Neuro for w/u\par PT\par Off steroids\par \par The patient has end-stage emphysema/she is awaiting lung transplantation\par She will continue BiPAP therapy\par She will continue oxygen 3 L/min 24 hours/day\par She will continue Symbicort, Spiriva, Singulair, and theophylline\par She will continue nebulizer therapy every 4 hours\par She will use Mucinex twice daily\par She will continue daily Zithromax\par She was reminded to do a repeat chest CT= prescription was given\par We will monitor her PFTs\par She will need a flu vaccine in the fall\par She no longer smokes\par She agrees to pulmonary rehab\par Continued weight loss was advised\par She will follow-up with cardiology\par She will continue daily Lasix & metolazone 2.5 mg 3 times weekly\par \par She will return in follow-up in 4 weeks and as needed\par She will call if her status worsens \par She will call for any medical or pulmonary issues\par All of her questions were answered\par She and her brother verbally confirmed understanding of all of the above\par She was given cups to collect sputum for C&S and equipment to do 24 hour urine collection as per Dr. Cervantes.

## 2020-07-24 NOTE — HEALTH RISK ASSESSMENT
[No] : No [(PHQ-2) Unable to screen] : PHQ-2: unable to screen [No falls in past year] : Patient reported no falls in the past year [de-identified] : PULM/CTS [] : No [de-identified] : Regular [de-identified] : None

## 2020-07-24 NOTE — PHYSICAL EXAM
[No Acute Distress] : no acute distress [Well Developed] : well developed [Well Nourished] : well nourished [Normal Voice/Communication] : normal voice/communication [Well-Appearing] : well-appearing [PERRL] : pupils equal round and reactive to light [Normal Sclera/Conjunctiva] : normal sclera/conjunctiva [EOMI] : extraocular movements intact [Normal Outer Ear/Nose] : the outer ears and nose were normal in appearance [No JVD] : no jugular venous distention [Normal TMs] : both tympanic membranes were normal [No Accessory Muscle Use] : no accessory muscle use [Supple] : supple [No Respiratory Distress] : no respiratory distress  [Regular Rhythm] : with a regular rhythm [Clear to Auscultation] : lungs were clear to auscultation bilaterally [Normal S1, S2] : normal S1 and S2 [No Carotid Bruits] : no carotid bruits [No Extremity Clubbing/Cyanosis] : no extremity clubbing/cyanosis [Pedal Pulses Present] : the pedal pulses are present [Soft] : abdomen soft [Normal Bowel Sounds] : normal bowel sounds [No Spinal Tenderness] : no spinal tenderness [No CVA Tenderness] : no CVA  tenderness [No Rash] : no rash [No Focal Deficits] : no focal deficits [Speech Grossly Normal] : speech grossly normal [Normal Affect] : the affect was normal [Alert and Oriented x3] : oriented to person, place, and time [Normal Rate] : normal rate  [Grossly Normal Strength/Tone] : grossly normal strength/tone [de-identified] : No stridor [de-identified] : Speaks without difficulty or any dyspnea [de-identified] : No sinus tenderness; wearing full face mask [de-identified] : Pursed lip breathing; respiratory rate= 20; very diminished air entry; no wheezing, no cough noted [de-identified] : improved edema; no cords [de-identified] : in wheelchair

## 2020-08-01 ENCOUNTER — INPATIENT (INPATIENT)
Facility: HOSPITAL | Age: 60
LOS: 43 days | Discharge: NURSING, REHAB OR LONG-TERM CARE | DRG: 291 | End: 2020-09-14
Attending: HOSPITALIST | Admitting: HOSPITALIST
Payer: COMMERCIAL

## 2020-08-01 VITALS
RESPIRATION RATE: 18 BRPM | HEART RATE: 69 BPM | WEIGHT: 175.05 LBS | OXYGEN SATURATION: 98 % | DIASTOLIC BLOOD PRESSURE: 76 MMHG | SYSTOLIC BLOOD PRESSURE: 138 MMHG | TEMPERATURE: 98 F | HEIGHT: 64 IN

## 2020-08-01 DIAGNOSIS — D72.829 ELEVATED WHITE BLOOD CELL COUNT, UNSPECIFIED: ICD-10-CM

## 2020-08-01 DIAGNOSIS — E87.5 HYPERKALEMIA: ICD-10-CM

## 2020-08-01 DIAGNOSIS — I10 ESSENTIAL (PRIMARY) HYPERTENSION: ICD-10-CM

## 2020-08-01 DIAGNOSIS — R53.1 WEAKNESS: ICD-10-CM

## 2020-08-01 DIAGNOSIS — J44.9 CHRONIC OBSTRUCTIVE PULMONARY DISEASE, UNSPECIFIED: ICD-10-CM

## 2020-08-01 DIAGNOSIS — I25.10 ATHEROSCLEROTIC HEART DISEASE OF NATIVE CORONARY ARTERY WITHOUT ANGINA PECTORIS: ICD-10-CM

## 2020-08-01 DIAGNOSIS — Z02.9 ENCOUNTER FOR ADMINISTRATIVE EXAMINATIONS, UNSPECIFIED: ICD-10-CM

## 2020-08-01 DIAGNOSIS — I48.0 PAROXYSMAL ATRIAL FIBRILLATION: ICD-10-CM

## 2020-08-01 DIAGNOSIS — E11.65 TYPE 2 DIABETES MELLITUS WITH HYPERGLYCEMIA: ICD-10-CM

## 2020-08-01 DIAGNOSIS — D64.9 ANEMIA, UNSPECIFIED: ICD-10-CM

## 2020-08-01 LAB
ALBUMIN SERPL ELPH-MCNC: 3.8 G/DL — SIGNIFICANT CHANGE UP (ref 3.3–5)
ALP SERPL-CCNC: 56 U/L — SIGNIFICANT CHANGE UP (ref 40–120)
ALT FLD-CCNC: 21 U/L — SIGNIFICANT CHANGE UP (ref 10–45)
ANION GAP SERPL CALC-SCNC: 6 MMOL/L — SIGNIFICANT CHANGE UP (ref 5–17)
ANION GAP SERPL CALC-SCNC: 8 MMOL/L — SIGNIFICANT CHANGE UP (ref 5–17)
APPEARANCE UR: CLEAR — SIGNIFICANT CHANGE UP
APTT BLD: 25.1 SEC — LOW (ref 27.5–35.5)
AST SERPL-CCNC: 24 U/L — SIGNIFICANT CHANGE UP (ref 10–40)
BACTERIA # UR AUTO: NEGATIVE — SIGNIFICANT CHANGE UP
BASOPHILS # BLD AUTO: 0.02 K/UL — SIGNIFICANT CHANGE UP (ref 0–0.2)
BASOPHILS NFR BLD AUTO: 0.2 % — SIGNIFICANT CHANGE UP (ref 0–2)
BILIRUB SERPL-MCNC: 0.2 MG/DL — SIGNIFICANT CHANGE UP (ref 0.2–1.2)
BILIRUB UR-MCNC: NEGATIVE — SIGNIFICANT CHANGE UP
BUN SERPL-MCNC: 37 MG/DL — HIGH (ref 7–23)
BUN SERPL-MCNC: 38 MG/DL — HIGH (ref 7–23)
CALCIUM SERPL-MCNC: 10.2 MG/DL — SIGNIFICANT CHANGE UP (ref 8.4–10.5)
CALCIUM SERPL-MCNC: 9.8 MG/DL — SIGNIFICANT CHANGE UP (ref 8.4–10.5)
CHLORIDE SERPL-SCNC: 94 MMOL/L — LOW (ref 96–108)
CHLORIDE SERPL-SCNC: 95 MMOL/L — LOW (ref 96–108)
CO2 SERPL-SCNC: 38 MMOL/L — HIGH (ref 22–31)
CO2 SERPL-SCNC: 39 MMOL/L — HIGH (ref 22–31)
COLOR SPEC: SIGNIFICANT CHANGE UP
CREAT SERPL-MCNC: 0.93 MG/DL — SIGNIFICANT CHANGE UP (ref 0.5–1.3)
CREAT SERPL-MCNC: 1 MG/DL — SIGNIFICANT CHANGE UP (ref 0.5–1.3)
DIFF PNL FLD: NEGATIVE — SIGNIFICANT CHANGE UP
EOSINOPHIL # BLD AUTO: 0.06 K/UL — SIGNIFICANT CHANGE UP (ref 0–0.5)
EOSINOPHIL NFR BLD AUTO: 0.5 % — SIGNIFICANT CHANGE UP (ref 0–6)
EPI CELLS # UR: 1 /HPF — SIGNIFICANT CHANGE UP
GLUCOSE BLDC GLUCOMTR-MCNC: 130 MG/DL — HIGH (ref 70–99)
GLUCOSE SERPL-MCNC: 119 MG/DL — HIGH (ref 70–99)
GLUCOSE SERPL-MCNC: 174 MG/DL — HIGH (ref 70–99)
GLUCOSE UR QL: ABNORMAL
HCT VFR BLD CALC: 37.9 % — SIGNIFICANT CHANGE UP (ref 34.5–45)
HGB BLD-MCNC: 11.1 G/DL — LOW (ref 11.5–15.5)
HYALINE CASTS # UR AUTO: 3 /LPF — HIGH (ref 0–2)
IMM GRANULOCYTES NFR BLD AUTO: 1.6 % — HIGH (ref 0–1.5)
INR BLD: 0.94 RATIO — SIGNIFICANT CHANGE UP (ref 0.88–1.16)
KETONES UR-MCNC: NEGATIVE — SIGNIFICANT CHANGE UP
LEUKOCYTE ESTERASE UR-ACNC: NEGATIVE — SIGNIFICANT CHANGE UP
LYMPHOCYTES # BLD AUTO: 1.29 K/UL — SIGNIFICANT CHANGE UP (ref 1–3.3)
LYMPHOCYTES # BLD AUTO: 10.3 % — LOW (ref 13–44)
MCHC RBC-ENTMCNC: 26.4 PG — LOW (ref 27–34)
MCHC RBC-ENTMCNC: 29.3 GM/DL — LOW (ref 32–36)
MCV RBC AUTO: 90 FL — SIGNIFICANT CHANGE UP (ref 80–100)
MONOCYTES # BLD AUTO: 1.24 K/UL — HIGH (ref 0–0.9)
MONOCYTES NFR BLD AUTO: 9.9 % — SIGNIFICANT CHANGE UP (ref 2–14)
NEUTROPHILS # BLD AUTO: 9.66 K/UL — HIGH (ref 1.8–7.4)
NEUTROPHILS NFR BLD AUTO: 77.5 % — HIGH (ref 43–77)
NITRITE UR-MCNC: NEGATIVE — SIGNIFICANT CHANGE UP
NRBC # BLD: 0 /100 WBCS — SIGNIFICANT CHANGE UP (ref 0–0)
PH UR: 6 — SIGNIFICANT CHANGE UP (ref 5–8)
PLATELET # BLD AUTO: 282 K/UL — SIGNIFICANT CHANGE UP (ref 150–400)
POTASSIUM SERPL-MCNC: 5.7 MMOL/L — HIGH (ref 3.5–5.3)
POTASSIUM SERPL-MCNC: 6 MMOL/L — HIGH (ref 3.5–5.3)
POTASSIUM SERPL-SCNC: 5.7 MMOL/L — HIGH (ref 3.5–5.3)
POTASSIUM SERPL-SCNC: 6 MMOL/L — HIGH (ref 3.5–5.3)
PROT SERPL-MCNC: 6.2 G/DL — SIGNIFICANT CHANGE UP (ref 6–8.3)
PROT UR-MCNC: ABNORMAL
PROTHROM AB SERPL-ACNC: 11.2 SEC — SIGNIFICANT CHANGE UP (ref 10.6–13.6)
RBC # BLD: 4.21 M/UL — SIGNIFICANT CHANGE UP (ref 3.8–5.2)
RBC # FLD: 15.3 % — HIGH (ref 10.3–14.5)
RBC CASTS # UR COMP ASSIST: 2 /HPF — SIGNIFICANT CHANGE UP (ref 0–4)
SARS-COV-2 RNA SPEC QL NAA+PROBE: SIGNIFICANT CHANGE UP
SODIUM SERPL-SCNC: 139 MMOL/L — SIGNIFICANT CHANGE UP (ref 135–145)
SODIUM SERPL-SCNC: 141 MMOL/L — SIGNIFICANT CHANGE UP (ref 135–145)
SP GR SPEC: 1.02 — SIGNIFICANT CHANGE UP (ref 1.01–1.02)
UROBILINOGEN FLD QL: NEGATIVE — SIGNIFICANT CHANGE UP
WBC # BLD: 12.47 K/UL — HIGH (ref 3.8–10.5)
WBC # FLD AUTO: 12.47 K/UL — HIGH (ref 3.8–10.5)
WBC UR QL: 3 /HPF — SIGNIFICANT CHANGE UP (ref 0–5)

## 2020-08-01 PROCEDURE — 93010 ELECTROCARDIOGRAM REPORT: CPT | Mod: 59

## 2020-08-01 PROCEDURE — 99223 1ST HOSP IP/OBS HIGH 75: CPT

## 2020-08-01 PROCEDURE — 71045 X-RAY EXAM CHEST 1 VIEW: CPT | Mod: 26

## 2020-08-01 PROCEDURE — 99285 EMERGENCY DEPT VISIT HI MDM: CPT

## 2020-08-01 RX ORDER — ATORVASTATIN CALCIUM 80 MG/1
80 TABLET, FILM COATED ORAL AT BEDTIME
Refills: 0 | Status: DISCONTINUED | OUTPATIENT
Start: 2020-08-01 | End: 2020-09-14

## 2020-08-01 RX ORDER — INSULIN LISPRO 100/ML
VIAL (ML) SUBCUTANEOUS AT BEDTIME
Refills: 0 | Status: DISCONTINUED | OUTPATIENT
Start: 2020-08-01 | End: 2020-09-14

## 2020-08-01 RX ORDER — DILTIAZEM HCL 120 MG
180 CAPSULE, EXT RELEASE 24 HR ORAL DAILY
Refills: 0 | Status: DISCONTINUED | OUTPATIENT
Start: 2020-08-02 | End: 2020-09-14

## 2020-08-01 RX ORDER — FUROSEMIDE 40 MG
40 TABLET ORAL DAILY
Refills: 0 | Status: DISCONTINUED | OUTPATIENT
Start: 2020-08-02 | End: 2020-08-03

## 2020-08-01 RX ORDER — BUDESONIDE AND FORMOTEROL FUMARATE DIHYDRATE 160; 4.5 UG/1; UG/1
2 AEROSOL RESPIRATORY (INHALATION)
Refills: 0 | Status: DISCONTINUED | OUTPATIENT
Start: 2020-08-01 | End: 2020-09-14

## 2020-08-01 RX ORDER — TIOTROPIUM BROMIDE 18 UG/1
1 CAPSULE ORAL; RESPIRATORY (INHALATION) DAILY
Refills: 0 | Status: DISCONTINUED | OUTPATIENT
Start: 2020-08-02 | End: 2020-09-14

## 2020-08-01 RX ORDER — SODIUM CHLORIDE 9 MG/ML
1000 INJECTION, SOLUTION INTRAVENOUS
Refills: 0 | Status: DISCONTINUED | OUTPATIENT
Start: 2020-08-01 | End: 2020-09-14

## 2020-08-01 RX ORDER — CHOLECALCIFEROL (VITAMIN D3) 125 MCG
2000 CAPSULE ORAL DAILY
Refills: 0 | Status: DISCONTINUED | OUTPATIENT
Start: 2020-08-01 | End: 2020-09-14

## 2020-08-01 RX ORDER — DEXTROSE 50 % IN WATER 50 %
25 SYRINGE (ML) INTRAVENOUS ONCE
Refills: 0 | Status: DISCONTINUED | OUTPATIENT
Start: 2020-08-01 | End: 2020-09-14

## 2020-08-01 RX ORDER — DEXTROSE 50 % IN WATER 50 %
15 SYRINGE (ML) INTRAVENOUS ONCE
Refills: 0 | Status: DISCONTINUED | OUTPATIENT
Start: 2020-08-01 | End: 2020-08-11

## 2020-08-01 RX ORDER — INSULIN LISPRO 100/ML
VIAL (ML) SUBCUTANEOUS
Refills: 0 | Status: DISCONTINUED | OUTPATIENT
Start: 2020-08-01 | End: 2020-09-14

## 2020-08-01 RX ORDER — FUROSEMIDE 40 MG
40 TABLET ORAL ONCE
Refills: 0 | Status: COMPLETED | OUTPATIENT
Start: 2020-08-01 | End: 2020-08-01

## 2020-08-01 RX ORDER — DEXTROSE 50 % IN WATER 50 %
50 SYRINGE (ML) INTRAVENOUS ONCE
Refills: 0 | Status: COMPLETED | OUTPATIENT
Start: 2020-08-01 | End: 2020-08-01

## 2020-08-01 RX ORDER — ALBUTEROL 90 UG/1
2 AEROSOL, METERED ORAL
Refills: 0 | Status: DISCONTINUED | OUTPATIENT
Start: 2020-08-01 | End: 2020-08-03

## 2020-08-01 RX ORDER — MONTELUKAST 4 MG/1
10 TABLET, CHEWABLE ORAL DAILY
Refills: 0 | Status: DISCONTINUED | OUTPATIENT
Start: 2020-08-01 | End: 2020-09-14

## 2020-08-01 RX ORDER — GLUCAGON INJECTION, SOLUTION 0.5 MG/.1ML
1 INJECTION, SOLUTION SUBCUTANEOUS ONCE
Refills: 0 | Status: DISCONTINUED | OUTPATIENT
Start: 2020-08-01 | End: 2020-09-14

## 2020-08-01 RX ORDER — ALBUTEROL 90 UG/1
2 AEROSOL, METERED ORAL
Qty: 0 | Refills: 0 | DISCHARGE

## 2020-08-01 RX ORDER — ROSUVASTATIN CALCIUM 5 MG/1
1 TABLET ORAL
Qty: 0 | Refills: 0 | DISCHARGE

## 2020-08-01 RX ORDER — AZITHROMYCIN 500 MG/1
0 TABLET, FILM COATED ORAL
Qty: 0 | Refills: 0 | DISCHARGE

## 2020-08-01 RX ORDER — APIXABAN 2.5 MG/1
5 TABLET, FILM COATED ORAL EVERY 12 HOURS
Refills: 0 | Status: DISCONTINUED | OUTPATIENT
Start: 2020-08-01 | End: 2020-09-14

## 2020-08-01 RX ORDER — PANTOPRAZOLE SODIUM 20 MG/1
40 TABLET, DELAYED RELEASE ORAL
Refills: 0 | Status: DISCONTINUED | OUTPATIENT
Start: 2020-08-01 | End: 2020-09-14

## 2020-08-01 RX ORDER — ASPIRIN/CALCIUM CARB/MAGNESIUM 324 MG
81 TABLET ORAL DAILY
Refills: 0 | Status: DISCONTINUED | OUTPATIENT
Start: 2020-08-01 | End: 2020-09-14

## 2020-08-01 RX ORDER — INSULIN GLARGINE 100 [IU]/ML
4 INJECTION, SOLUTION SUBCUTANEOUS AT BEDTIME
Refills: 0 | Status: DISCONTINUED | OUTPATIENT
Start: 2020-08-01 | End: 2020-08-04

## 2020-08-01 RX ORDER — DEXTROSE 50 % IN WATER 50 %
12.5 SYRINGE (ML) INTRAVENOUS ONCE
Refills: 0 | Status: DISCONTINUED | OUTPATIENT
Start: 2020-08-01 | End: 2020-08-11

## 2020-08-01 RX ORDER — DEXTROSE 50 % IN WATER 50 %
25 SYRINGE (ML) INTRAVENOUS ONCE
Refills: 0 | Status: DISCONTINUED | OUTPATIENT
Start: 2020-08-01 | End: 2020-08-11

## 2020-08-01 RX ORDER — IPRATROPIUM BROMIDE 0.2 MG/ML
500 SOLUTION, NON-ORAL INHALATION ONCE
Refills: 0 | Status: COMPLETED | OUTPATIENT
Start: 2020-08-01 | End: 2020-08-01

## 2020-08-01 RX ORDER — SODIUM ZIRCONIUM CYCLOSILICATE 10 G/10G
5 POWDER, FOR SUSPENSION ORAL ONCE
Refills: 0 | Status: COMPLETED | OUTPATIENT
Start: 2020-08-01 | End: 2020-08-01

## 2020-08-01 RX ORDER — THEOPHYLLINE ANHYDROUS 200 MG
400 TABLET, EXTENDED RELEASE 12 HR ORAL DAILY
Refills: 0 | Status: DISCONTINUED | OUTPATIENT
Start: 2020-08-02 | End: 2020-09-14

## 2020-08-01 RX ORDER — INSULIN HUMAN 100 [IU]/ML
10 INJECTION, SOLUTION SUBCUTANEOUS ONCE
Refills: 0 | Status: COMPLETED | OUTPATIENT
Start: 2020-08-01 | End: 2020-08-01

## 2020-08-01 RX ADMIN — ATORVASTATIN CALCIUM 80 MILLIGRAM(S): 80 TABLET, FILM COATED ORAL at 23:51

## 2020-08-01 RX ADMIN — Medication 40 MILLIGRAM(S): at 22:38

## 2020-08-01 RX ADMIN — INSULIN GLARGINE 4 UNIT(S): 100 INJECTION, SOLUTION SUBCUTANEOUS at 23:48

## 2020-08-01 RX ADMIN — Medication 500 MICROGRAM(S): at 23:36

## 2020-08-01 RX ADMIN — INSULIN HUMAN 10 UNIT(S): 100 INJECTION, SOLUTION SUBCUTANEOUS at 23:47

## 2020-08-01 RX ADMIN — SODIUM ZIRCONIUM CYCLOSILICATE 5 GRAM(S): 10 POWDER, FOR SUSPENSION ORAL at 23:49

## 2020-08-01 RX ADMIN — Medication 50 MILLILITER(S): at 23:47

## 2020-08-01 NOTE — ED ADULT NURSE NOTE - OBJECTIVE STATEMENT
Patient with presents to Ed via amb with c/o and weakness. Patient with hx of COPD reports being discharged from  [facility] yesterday. Patient with presents to Ed via amb with c/o and weakness. Patient with hx of COPD reports being discharged from  [facility] yesterday after being admitted for weakness. Patient reports she is increasingly weak and is   more sob. Patient on 3LNC home O2 bilat arms bruised at various sites with swelling to b/l hands. Patient denies chest  pain, +sob on exertion, no nausea vomiting fever chills cough headache or dizziness. LFA 20g iv lock placed, unable to obtain  blood work, Md made aware.

## 2020-08-01 NOTE — ED PROVIDER NOTE - PHYSICAL EXAMINATION
GEN: Pt non-toxic in NAD on 4L O2, A&Ox3.  PSYCH: Affect and mood appropriate.  EYES: Sclera white w/o injection, EOMI..   ENT: Neck supple FROM. Airway patent.  RESP: No chest wall tenderness, diminished breath sounds throughout, no wheezes, rales or rhonchi.   CARDIAC: RRR, clear distinct S1, S2.  ABD: Abdomen soft, non-tender. No CVAT b/l.  MSK: No joint erythema or obvious deformity. No obvious spinal deformity, no midline spinal ttp, no step-offs. FROM b/l UE and LE w/o pain. LE strength 3/5.  NEURO: CN 2-12 grossly intact. No focal motor or sensory deficits.  VASC: Radial pulses 2+ b/l. Dorsalis pedis pulses 1+ b/l. No edema or calf tenderness.  SKIN: Multiple bruises b/l UE 2/2 recent hospital admission IVs and blood draws. No rashes or lesions.

## 2020-08-01 NOTE — H&P ADULT - PROBLEM SELECTOR PLAN 3
- repeat BMP  - give lokelma and IV lasix  - dose lantus - repeat BMP  - no peak T wave on admit ekg (does have RBBB, suspect from endstage COPD)  - give lokelma and IV lasix  - dose lantus

## 2020-08-01 NOTE — H&P ADULT - NSHPPHYSICALEXAM_GEN_ALL_CORE
Vital Signs Last 24 Hrs  T(C): 37 (01 Aug 2020 19:19), Max: 37 (01 Aug 2020 19:19)  T(F): 98.6 (01 Aug 2020 19:19), Max: 98.6 (01 Aug 2020 19:19)  HR: 66 (01 Aug 2020 19:19) (66 - 73)  BP: 147/73 (01 Aug 2020 19:19) (117/80 - 147/73)  RR: 22 (01 Aug 2020 19:19) (18 - 22)  SpO2: 100% (01 Aug 2020 19:19) (98% - 100%) on 3L NC    GENERAL: NAD, obese  HEAD:  Atraumatic, Normocephalic  EYES: EOMI, PERRL, conjunctiva and sclera clear  Mouth: MMM, no lesions  NECK: Supple, no appreciable masses  Lung: normal work of breathing, cta b/l  Chest: S1&S2+, rrr, no m/r/g appreciated  ABDOMEN: bs+, soft, nt, nd  : No naranjo catheter, no CVA tenderness  EXTREMITIES:  radial pulse present b/l, PT present b/l, mild pitting edema in b/l LE below just above ankle, no pain to palpation of calf b/l, thigh b/l , hamstring b/l  Neuro: A&Ox3, CN II-XII intact, finger-to-nose intact b/l, no pronator drift b/l, no flaccid paralysis in extremities appreciated, upper extremity bicep/tricep 5/5 and hand  normal and equal b/l, 5/5 strength in right hip flexor, 3-4/5 strength in left hip flexor (note patient during interview is able to flex against gravity when moving left leg but during focused exam has difficulty keeping leg elevated off bed-see psych portion of exam), on knee extension right is 5/5 and left is 5/5, plantar/dorsiflexion 5/5 b/l, sensation intact b/l, raynaud phenomenon in hands  Psych: oriented as above, at times is labile and will become argumentative/demanding, unable to obtain collateral from family, may have baseline personality disorder, denies hallucinations, during interview is able to lift left leg off bed to uncross legs but during exam reports unable to lift leg against gravity (maybe exaggerating degree of focal weakness?)  SKIN: warm and dry, no visible purulence in exposed areas

## 2020-08-01 NOTE — H&P ADULT - ATTENDING COMMENTS
Patient assigned to me by night hospitalist in charge for management and care for patient for this evening only. Care to be resumed by day hospitalist at 08:00 in the morning and thereafter.     Faisal Brown MD  Medicine Attending  Department of Hospital Medicine  pager: [phone] (available from 20:00 to 08:00)

## 2020-08-01 NOTE — ED ADULT NURSE REASSESSMENT NOTE - NS ED NURSE REASSESS COMMENT FT1
report received from CELESTE Herrera, pt resting in bed VSS. awaiting disposition. titrated oxygen from 5L NC to 3L NC, pt consistently saturating at 100%.

## 2020-08-01 NOTE — H&P ADULT - NSHPSOCIALHISTORY_GEN_ALL_CORE
former smoker (quit 6 yr prior), social alcohol, no drug reported. lives in home with brother. works as a

## 2020-08-01 NOTE — H&P ADULT - PROBLEM SELECTOR PLAN 9
will give1x dose of lasix 40mg at time of admit given LE edema and report of leg heaviness but will then continue home lasix dosing 40mg d  - c/w home diltiazem dosing  - outpatient chart documents patient is supposed to be on metolazone MWF but patient denies taking medication

## 2020-08-01 NOTE — H&P ADULT - PROBLEM SELECTOR PLAN 4
- does not appear to have infection or sepsis at time of medicine admit  - likely related to steroid use  - unclear if relation to weakness  - monitor off antibiotics  - monitor w/ daily cbc

## 2020-08-01 NOTE — H&P ADULT - ASSESSMENT
60F w/ end stage COPD on 3LNC (pending transplant list at [facility]), former smoker, CAD s/p stent (2016), afib on eliquis, uncontrolled DM2, raynaud phenomenon and recent hospitalization at [facility] for altered mental status and [facility] presents to [facility] for evaluation of generalized weakness and difficulty walking. 60F w/ end stage COPD on 3LNC (pending transplant list at [facility]), former smoker, CAD s/p stent (2016), afib on eliquis, uncontrolled DM2, raynaud phenomenon and recent hospitalization at [facility] for altered mental status and [facility] presents to [facility] for evaluation of generalized weakness and difficulty walking admit to medicine for further evaluation.

## 2020-08-01 NOTE — H&P ADULT - PROBLEM SELECTOR PLAN 6
per chart review has had slight anemia at times  - no signs of hemorrhage on exam  - repeat cbc in am to confirm

## 2020-08-01 NOTE — H&P ADULT - NSICDXPASTMEDICALHX_GEN_ALL_CORE_FT
PAST MEDICAL HISTORY:  Atrial fibrillation     Chronic sinusitis     Claustrophobia     COPD (chronic obstructive pulmonary disease)     DM (diabetes mellitus)     HTN (hypertension)     Hyperlipemia     Raynaud phenomenon

## 2020-08-01 NOTE — H&P ADULT - NSHPREVIEWOFSYSTEMS_GEN_ALL_CORE
CONSTITUTIONAL: No fever, no chills  EYES: No eye pain, no acute blindness  Mouth: no pain in mouth, no cuts  RESPIRATORY: No cough, No sob  CARDIOVASCULAR: No CP, no palpitations  GASTROINTESTINAL: no abdominal pain, no n/v/d  GENITOURINARY: No dysuria, no hematuria  Heme: bruises are from recent hospitalization, no swelling of neck  NEUROLOGICAL: No seizure, No acute paralysis, generalized weakness+  SKIN: No itching, no rashes  MUSCULOSKELETAL: No acute joint pain, no joint swelling

## 2020-08-01 NOTE — ED PROVIDER NOTE - OBJECTIVE STATEMENT
60y F pmhx HTN, HLD, afib on eliquis, CAD s/p stents, COPD on 3LNCO2 at baseline p/w generalized weakness. Pt reports she was discharged from [facility] after 1 week admission for weakness, ?elevated Cr. Returns today w/ severe weakness, unable to lift self out of bed or off couch, inability to ambulate w/ walker. Reports normal urine production. Denies SOB differing from baseline, fever, neck pain, cp, abdominal pain, n/v/d/c.

## 2020-08-01 NOTE — H&P ADULT - PROBLEM SELECTOR PLAN 5
uncontrolled DM2 with hyperglycemia  - only on oral agents as outpatient  - currently receiving steroids  - start [id number] at bedtime with ISS. will likely need to titrate to standing premeal given steroid use  - cannot start Ace inhibitor or ARB due to hyperkalemia however significant glucosuria on admit UA

## 2020-08-01 NOTE — H&P ADULT - PROBLEM SELECTOR PLAN 1
- report of generalized weakness as well as leg weakness. Exam with diminished strength in left hip flexor 3-4/5.  - no urinary/bowel incontinence, no saddle anesthesia, no reported fall/trauma, and reports recent normal CT head this week. Patient REFUSES MR imaging for further evaluation at time of medicine admit despite recommendation. Patient however is agreeable to neurology evaluation in the morning.   - per chart review, complaint of weakness - report of generalized weakness as well as leg weakness. Exam with diminished strength in left hip flexor 3-4/5.  - no urinary/bowel incontinence, no saddle anesthesia, no reported fall/trauma, and reports recent normal CT head this week. Patient REFUSES MR imaging for further evaluation at time of medicine admit despite recommendation. Patient however is agreeable to neurology evaluation in the morning.   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 Outpatient lab work this month with TSH,b12,folate, iron wnl.  - Plan for neurologic evaluation in am.  - Patient currently being tapered off of steroids (received IV steroid at [facility]) now on prednisone 40d. Given end-stage COPD would favor conservative steroid taper.  - unable to obtain collateral from family- brother Michele Vega listed as contact and per patient is brother to communicate with. Could not reach with listed number and by number provided by patient  [phone].  - may require psych eval- reports of concern for hallucination in outpatient chart, recent confusion, prednisone use, on exam labile and at times argumentative, discrepancy with strength of left hip flexor during interview (able to uncross leg) but then limited ability lift left leg off bed during exam). Will need day team to reassess in am and reattempt to obtain collateral from family.  - per chart review: TTE 2018 and EVAN 2019 demonstrate normal LV/RV systolic function (home lasix is for HTN)

## 2020-08-01 NOTE — H&P ADULT - PROBLEM SELECTOR PLAN 2
- for now c/w prednisone 40mg d, spiriva, symbicort, ventolin, theophyline  - on 3LNC at baseline  - at time of medicine admit is not in exacerbation, CXR does not demonstrate lobar opacification c/w pneumonia, COVID19 PCR not detected (8/1/20)

## 2020-08-01 NOTE — H&P ADULT - HISTORY OF PRESENT ILLNESS
60F w/ end stage COPD on 3LNC (pending transplant list at [facility]), former smoker, CAD s/p stent (2016), afib on eliquis, uncontrolled DM2, raynaud phenomenon and recent hospitalization at [facility] for altered mental status and ARTURO presents to [facility] for evaluation of generalized weakness and difficulty walking. Patient reports that she was recently hospitalized at [facility] from 7/25-7/31 for confusion and ARTURO. At that time, she was told by her brother (lives downstairs in home) that she was not herself and did not recognize him which is why she was sent to hospital. Per chart, Dr. Mathew (PCP) has chart notes regarding possible hallucinations. While at [facility] she had head CT reported to be normal and she declined MR brain. Her Arturo (Cr increased to 1.5) was reported to resolve with IV fluid. She was discharged to home 1d prior to presentation to [facility] and reports since discharge she has had generalized weakness, inability to walk due to leg heaviness/weakness L>R, and sensation that she has retained fluid in her legs. At baseline she walks with a walker and per chart review her PCP was concerned this month that the patient was developing steroid-induced myopathy and planned on having patient evaluated by neurology as well as obtain MRI at [facility]. No reported fall, head trauma, seizure, paralysis, loss of sensation in extremities or saddle anesthesia, fever, chills, cp, cough, worsening baseline sob (uses 3LNC), n/v/d, inability to urinate or urinary incontinence, constipation or bowel incontinence, or rash (has bruised on arm form needle stick from recent hospitalization).    In the ED, VS 97.5, 138/76, 69, 18 98%3LNC 60F w/ end stage COPD on 3LNC (pending transplant list at [facility]), former smoker, CAD s/p stent (2016), afib on eliquis, uncontrolled DM2, raynaud phenomenon and recent hospitalization at [facility] for altered mental status and ARTURO presents to [facility] for evaluation of generalized weakness and difficulty walking. Patient reports that she was recently hospitalized at [facility] from 7/25-7/31 for confusion and ARTURO. At that time, she was told by her brother (lives downstairs in home) that she was not herself and did not recognize him which is why she was sent to hospital. Per chart, Dr. Mathew (PCP) has chart notes regarding possible hallucinations. While at [facility] she had head CT reported to be normal and she declined MR brain. Her Arturo (Cr increased to 1.5) was reported to resolve with IV fluid. She was discharged to home 1d prior to presentation to [facility] and reports since discharge she has had generalized weakness, inability to walk due to leg heaviness/weakness L>R, and sensation that she has retained fluid in her legs. At baseline she walks with a walker and per chart review her PCP was concerned this month that the patient was developing steroid-induced myopathy and planned on having patient evaluated by neurology as well as obtain MRI at [facility]. No reported fall, head trauma, seizure, paralysis, loss of sensation in extremities or saddle anesthesia, fever, chills, cp, cough, worsening baseline sob (uses 3LNC), n/v/d, inability to urinate or urinary incontinence, constipation or bowel incontinence, or rash (has bruised on arm form needle stick from recent hospitalization).    DENIES allergy to albuterol- never rash, no anaphylaxis per patient  In the ED, VS 97.5, 138/76, 69, 18 98%3LNC

## 2020-08-01 NOTE — ED PROVIDER NOTE - INTERPRETATION
EKG reviewed for rate, rhythm, axis, intervals and segments, including QRS morphology, P wave appearance T wave appearance, MD interval, and QT interval.  I find the EKG to be unremarkable in all of these regards except as follows: RBBB, LAFB, precordial biphasic t waves

## 2020-08-01 NOTE — ED PROVIDER NOTE - ATTENDING CONTRIBUTION TO CARE
61 yo female COPD on home O2, DM, recent admission to OSH for generalized weakness, cleared by PT to go home with no needs per patient, re-presents for inability to ambulate or carry out ADLs @ home.  NAD on exam.  states she lives alone.  will check basics, admit for PT eval and possible placement.

## 2020-08-01 NOTE — H&P ADULT - PROBLEM SELECTOR PLAN 10
Transitions of Care Status:  1.  Name of PCP: Dr. Mathew  2.  PCP Contacted on Admission: [ ] Y    [ X] N    3.  PCP contacted at Discharge: [ ] Y    [ ] N    [ ] N/A  4.  Post-Discharge Appointment Date and Location:  5.  Summary of Handoff given to PCP:

## 2020-08-01 NOTE — ED PROVIDER NOTE - SHIFT CHANGE DETAILS
60F DC'ed [facility] yesterday, COPD on home O2, DM, CAD, weakness unk etiology, now difficulty walking, PMD MANDEEP Mathew, pending labs and then to be readmitted

## 2020-08-02 LAB
ANION GAP SERPL CALC-SCNC: 7 MMOL/L — SIGNIFICANT CHANGE UP (ref 5–17)
BUN SERPL-MCNC: 39 MG/DL — HIGH (ref 7–23)
CALCIUM SERPL-MCNC: 9.5 MG/DL — SIGNIFICANT CHANGE UP (ref 8.4–10.5)
CHLORIDE SERPL-SCNC: 91 MMOL/L — LOW (ref 96–108)
CO2 SERPL-SCNC: 42 MMOL/L — HIGH (ref 22–31)
CREAT SERPL-MCNC: 1.24 MG/DL — SIGNIFICANT CHANGE UP (ref 0.5–1.3)
CULTURE RESULTS: SIGNIFICANT CHANGE UP
GLUCOSE BLDC GLUCOMTR-MCNC: 194 MG/DL — HIGH (ref 70–99)
GLUCOSE BLDC GLUCOMTR-MCNC: 259 MG/DL — HIGH (ref 70–99)
GLUCOSE BLDC GLUCOMTR-MCNC: 305 MG/DL — HIGH (ref 70–99)
GLUCOSE BLDC GLUCOMTR-MCNC: 359 MG/DL — HIGH (ref 70–99)
GLUCOSE BLDC GLUCOMTR-MCNC: 410 MG/DL — HIGH (ref 70–99)
GLUCOSE BLDC GLUCOMTR-MCNC: 72 MG/DL — SIGNIFICANT CHANGE UP (ref 70–99)
GLUCOSE SERPL-MCNC: 338 MG/DL — HIGH (ref 70–99)
POTASSIUM SERPL-MCNC: 5.5 MMOL/L — HIGH (ref 3.5–5.3)
POTASSIUM SERPL-SCNC: 5.5 MMOL/L — HIGH (ref 3.5–5.3)
SODIUM SERPL-SCNC: 140 MMOL/L — SIGNIFICANT CHANGE UP (ref 135–145)
SPECIMEN SOURCE: SIGNIFICANT CHANGE UP

## 2020-08-02 PROCEDURE — 99222 1ST HOSP IP/OBS MODERATE 55: CPT

## 2020-08-02 PROCEDURE — 99233 SBSQ HOSP IP/OBS HIGH 50: CPT

## 2020-08-02 RX ORDER — SODIUM ZIRCONIUM CYCLOSILICATE 10 G/10G
5 POWDER, FOR SUSPENSION ORAL ONCE
Refills: 0 | Status: COMPLETED | OUTPATIENT
Start: 2020-08-02 | End: 2020-08-02

## 2020-08-02 RX ADMIN — MONTELUKAST 10 MILLIGRAM(S): 4 TABLET, CHEWABLE ORAL at 11:09

## 2020-08-02 RX ADMIN — ALBUTEROL 2 PUFF(S): 90 AEROSOL, METERED ORAL at 06:03

## 2020-08-02 RX ADMIN — SODIUM ZIRCONIUM CYCLOSILICATE 5 GRAM(S): 10 POWDER, FOR SUSPENSION ORAL at 23:31

## 2020-08-02 RX ADMIN — Medication 400 MILLIGRAM(S): at 11:11

## 2020-08-02 RX ADMIN — TIOTROPIUM BROMIDE 1 CAPSULE(S): 18 CAPSULE ORAL; RESPIRATORY (INHALATION) at 11:11

## 2020-08-02 RX ADMIN — PANTOPRAZOLE SODIUM 40 MILLIGRAM(S): 20 TABLET, DELAYED RELEASE ORAL at 06:03

## 2020-08-02 RX ADMIN — APIXABAN 5 MILLIGRAM(S): 2.5 TABLET, FILM COATED ORAL at 17:00

## 2020-08-02 RX ADMIN — Medication 2000 UNIT(S): at 11:11

## 2020-08-02 RX ADMIN — BUDESONIDE AND FORMOTEROL FUMARATE DIHYDRATE 2 PUFF(S): 160; 4.5 AEROSOL RESPIRATORY (INHALATION) at 17:00

## 2020-08-02 RX ADMIN — BUDESONIDE AND FORMOTEROL FUMARATE DIHYDRATE 2 PUFF(S): 160; 4.5 AEROSOL RESPIRATORY (INHALATION) at 06:04

## 2020-08-02 RX ADMIN — ALBUTEROL 2 PUFF(S): 90 AEROSOL, METERED ORAL at 17:00

## 2020-08-02 RX ADMIN — Medication 3: at 12:17

## 2020-08-02 RX ADMIN — Medication 81 MILLIGRAM(S): at 11:10

## 2020-08-02 RX ADMIN — INSULIN GLARGINE 4 UNIT(S): 100 INJECTION, SOLUTION SUBCUTANEOUS at 21:41

## 2020-08-02 RX ADMIN — ATORVASTATIN CALCIUM 80 MILLIGRAM(S): 80 TABLET, FILM COATED ORAL at 21:41

## 2020-08-02 RX ADMIN — Medication 180 MILLIGRAM(S): at 06:04

## 2020-08-02 RX ADMIN — Medication 5: at 16:55

## 2020-08-02 RX ADMIN — Medication 40 MILLIGRAM(S): at 06:04

## 2020-08-02 RX ADMIN — Medication 2: at 21:41

## 2020-08-02 RX ADMIN — APIXABAN 5 MILLIGRAM(S): 2.5 TABLET, FILM COATED ORAL at 06:04

## 2020-08-02 RX ADMIN — Medication 1 TABLET(S): at 11:11

## 2020-08-02 NOTE — PATIENT PROFILE ADULT - FALLEN IN THE PAST
injury to left groin area about one month ago ?pulled muscle, had xrays, saw pmd but is still having a lot of pain and trouble getting fu yes

## 2020-08-02 NOTE — CONSULT NOTE ADULT - SUBJECTIVE AND OBJECTIVE BOX
HPI:  60F w/ end stage COPD on 3LNC (pending transplant list at [facility]), former smoker, CAD s/p stent (2016), afib on eliquis, uncontrolled DM2, raynaud phenomenon and recent hospitalization at [facility] for altered mental status and ARTURO presents to [facility] for evaluation of generalized weakness and difficulty walking. Patient reports that she was recently hospitalized at [facility] from - for confusion and ARTURO. At that time, she was told by her brother (lives downstairs in home) that she was not herself and did not recognize him which is why she was sent to hospital. Per chart, Dr. Mathew (PCP) has chart notes regarding possible hallucinations. While at [facility] she had head CT reported to be normal and she declined MR brain. Her Arturo (Cr increased to 1.5) was reported to resolve with IV fluid. She was discharged to home 1d prior to presentation to [facility] and reports since discharge she has had generalized weakness, inability to walk due to leg heaviness/weakness L>R, and sensation that she has retained fluid in her legs. At baseline she walks with a walker and per chart review her PCP was concerned this month that the patient was developing steroid-induced myopathy and planned on having patient evaluated by neurology as well as obtain MRI at [facility]. No reported fall, head trauma, seizure, paralysis, loss of sensation in extremities or saddle anesthesia, fever, chills, cp, cough, worsening baseline sob (uses 3LNC), n/v/d, inability to urinate or urinary incontinence, constipation or bowel incontinence, or rash (has bruised on arm form needle stick from recent hospitalization).    Neurology Interview:  Patient was interviewed at the bedside. Complains of pain and weakness diffusely but states that is more prominent in her shoulders, calfs, and feet. Notes that her pain feels like a burning in her feet and she associated it with a redness color change. She states that she has been taking steroids for many years though varies her response (1 year vs 6 years) and states that whenever she reduces her dose or does not take the medication she feels better. She believes that she started to feel the weakness when she started taking steroids but cannot for certain state whether the two are associated. Notes that she has difficulty with walking sometimes and feels it is difficult to stand up or climb stairs. Movement makes her symptoms feel worse. Denies back pain, radicular symptoms. Denies history of taking statin medications. Here has been refusing MRI.   As per external chart check has been on steroid medications since at least . Has had EMG but many years ago and is unsure of the results. Has never had a muscle biopsy. Was supposed to see Dr. Dwight Rosenstein for further workup.      REVIEW OF SYSTEMS    Chronically complains of shortness of breath.  A 10-system ROS was performed and is negative except for those items noted above and/or in the HPI.    PAST MEDICAL & SURGICAL HISTORY:  Atrial fibrillation  Hyperlipemia  Chronic sinusitis  Raynaud phenomenon  HTN (hypertension)  DM (diabetes mellitus)  Claustrophobia  COPD (chronic obstructive pulmonary disease)  S/P tonsillectomy    FAMILY HISTORY:  FH: MI (myocardial infarction)  FH: CABG (coronary artery bypass surgery)    SOCIAL HISTORY:   T/E/D: Hx of smoking      MEDICATIONS (HOME):  Home Medications:  apixaban 5 mg oral tablet: 1 tab(s) orally every 12 hours (01 Aug 2020 21:)  aspirin 81 mg oral delayed release tablet: 1 tab(s) orally once a day (01 Aug 2020 21:52)  CoQ10: 200 milligram(s) orally once a day (01 Aug 2020 21:)  metFORMIN 500 mg oral tablet: 1 tab(s) orally 2 times a day (01 Aug 2020 21:)  Multiple Vitamins oral tablet: 1 tab(s) orally once a day (01 Aug 2020 21:)  nitroglycerin 0.4 mg sublingual tablet: 1 tab(s) sublingual every 5 minutes, As Needed (01 Aug 2020 21:)  Onglyza 5 mg oral tablet: 1 tab(s) orally once a day (01 Aug 2020 21:)  pantoprazole 40 mg oral delayed release tablet: 1 tab(s) orally once a day (before a meal) (01 Aug 2020 21:)  predniSONE 20 mg oral tablet: 2 tab(s) orally once a day (01 Aug 2020 21:52)  rosuvastatin 20 mg oral tablet: 1 tab(s) orally once a day (01 Aug 2020 21:52)  Singulair 10 mg oral tablet: 1 tab(s) orally once a day (in the evening) (01 Aug 2020 21:52)  Spiriva 18 mcg inhalation capsule: 1 cap(s) inhaled once a day (01 Aug 2020 21:52)  Symbicort 160 mcg-4.5 mcg/inh inhalation aerosol: 2 puff(s) inhaled 2 times a day (01 Aug 2020 21:52)  theophylline 400 mg/24 hours oral tablet, extended release: 1 tab(s) orally once a day (01 Aug 2020 21:52)  Ventolin HFA 90 mcg/inh inhalation aerosol: 2 puff(s) inhaled 2 times a day (01 Aug 2020 21:52)  Vitamin D3 2000 intl units oral tablet: 1 tab(s) orally once a day (01 Aug 2020 21:52)    MEDICATIONS  (STANDING):  ALBUTerol    90 MICROgram(s) HFA Inhaler 2 Puff(s) Inhalation two times a day  apixaban 5 milliGRAM(s) Oral every 12 hours  aspirin enteric coated 81 milliGRAM(s) Oral daily  atorvastatin 80 milliGRAM(s) Oral at bedtime  budesonide 160 MICROgram(s)/formoterol 4.5 MICROgram(s) Inhaler 2 Puff(s) Inhalation two times a day  cholecalciferol 2000 Unit(s) Oral daily  dextrose 5%. 1000 milliLiter(s) (50 mL/Hr) IV Continuous <Continuous>  dextrose 50% Injectable 12.5 Gram(s) IV Push once  dextrose 50% Injectable 25 Gram(s) IV Push once  dextrose 50% Injectable 25 Gram(s) IV Push once  diltiazem    milliGRAM(s) Oral daily  furosemide    Tablet 40 milliGRAM(s) Oral daily  insulin glargine Injectable (LANTUS) 4 Unit(s) SubCutaneous at bedtime  insulin lispro (HumaLOG) corrective regimen sliding scale   SubCutaneous three times a day before meals  insulin lispro (HumaLOG) corrective regimen sliding scale   SubCutaneous at bedtime  montelukast 10 milliGRAM(s) Oral daily  multivitamin 1 Tablet(s) Oral daily  pantoprazole    Tablet 40 milliGRAM(s) Oral before breakfast  predniSONE   Tablet 40 milliGRAM(s) Oral daily  theophylline ER (24 Hour) 400 milliGRAM(s) Oral daily  tiotropium 18 MICROgram(s) Capsule 1 Capsule(s) Inhalation daily    MEDICATIONS  (PRN):  dextrose 40% Gel 15 Gram(s) Oral once PRN Blood Glucose LESS THAN 70 milliGRAM(s)/deciliter  glucagon  Injectable 1 milliGRAM(s) IntraMuscular once PRN Glucose LESS THAN 70 milligrams/deciliter    ALLERGIES/INTOLERANCES:  Allergies  No Known Allergies    Intolerances  albuterol (Unknown)    VITALS & EXAMINATION:  Vital Signs Last 24 Hrs  T(C): 36.3 (02 Aug 2020 08:55), Max: 37 (01 Aug 2020 19:19)  T(F): 97.4 (02 Aug 2020 08:55), Max: 98.6 (01 Aug 2020 19:19)  HR: 98 (02 Aug 2020 09:51) (66 - 98)  BP: 152/88 (02 Aug 2020 08:55) (117/80 - 152/88)  BP(mean): --  RR: 22 (02 Aug 2020 09:51) (18 - 22)  SpO2: 95% (02 Aug 2020 09:51) (95% - 100%)    General:  Constitutional: Obese Female, appears stated age, in mild apparent distress regarding her breathing status  Head: Normocephalic & atraumatic.  Skin: Red discoloration of her toes bilaterally.     Neurological (>12):  MS: Awake, alert, oriented to person, place, situation, time. Follows all commands. Able to spell WORLD forwards but difficulty backwards. Serial 7s to 93. Short attention span noted.    Language: Speech is clear, fluent with good repetition & comprehension (able to name objects). Pressured speech.     CNs: PERRLA (R = 3mm, L = 3mm). VF, EOMI no nystagmus, no diplopia. V1-3 intact to LT/pinprick, well developed masseter muscles b/l. No facial asymmetry b/l, full eye closure strength b/l. Hearing grossly normal (rubbing fingers) b/l. Symmetric palate elevation in midline. Gag reflex deferred. Head turning & shoulder shrug intact b/l. Tongue midline, normal movements, no atrophy.  Neck strength 5/5 to flexion and extension.     Motor: Normal muscle bulk & tone. No noticeable tremor or seizure. No pronator drift.              Deltoid	Biceps	Triceps	Wrist	Finger ABd	   R	5	4	4	5	3		4 	  L	5	4	4	5	3		4    	H-Flex	H-Ext	H-ABd	H-ADd	K-Flex	K-Ext	D-Flex	P-Flex  R	4	4	5	5	5	5	5	5 	   L	4	4	5	5	5	5	5	5	     Sensation: Intact bilaterally to light touch and pinprick with heightened sensitivity in the lower extremities. Decreased vibratory sense and proprioception in the lower extremity past the middle of the calf.     Reflexes:              Biceps(C5)       BR(C6)     Triceps(C7)               Patellar(L4)    Achilles(S1)    Plantar Resp  R	1	          1	             1		        1		    1		Withdraw due to pain  L	1	          1	             1		        1		    1		Withdraw due to pain    Coordination: intact rapid-alt movements. No dysmetria to FTN/HTS    Gait: Refused to stand.     LABORATORY:  CBC                       11.1   12.47 )-----------( 282      ( 01 Aug 2020 18:40 )             37.9     Chem 08-    141  |  95<L>  |  38<H>  ----------------------------<  174<H>  6.0<H>   |  38<H>  |  1.00    Ca    9.8      01 Aug 2020 22:14    TPro  6.2  /  Alb  3.8  /  TBili  0.2  /  DBili  x   /  AST  24  /  ALT  21  /  AlkPhos  56  08-    LFTs LIVER FUNCTIONS - ( 01 Aug 2020 18:40 )  Alb: 3.8 g/dL / Pro: 6.2 g/dL / ALK PHOS: 56 U/L / ALT: 21 U/L / AST: 24 U/L / GGT: x           Coagulopathy PT/INR - ( 01 Aug 2020 18:40 )   PT: 11.2 sec;   INR: 0.94 ratio         PTT - ( 01 Aug 2020 18:40 )  PTT:25.1 sec  Lipid Panel   A1c   Cardiac enzymes     U/A Urinalysis Basic - ( 01 Aug 2020 16:18 )    Color: Light Yellow / Appearance: Clear / S.020 / pH: x  Gluc: x / Ketone: Negative  / Bili: Negative / Urobili: Negative   Blood: x / Protein: 30 mg/dL / Nitrite: Negative   Leuk Esterase: Negative / RBC: 2 /hpf / WBC 3 /HPF   Sq Epi: x / Non Sq Epi: 1 /hpf / Bacteria: Negative      STUDIES & IMAGING:    Radiology (XR, CT, MR, U/S, TTE/EVAN):

## 2020-08-02 NOTE — CONSULT NOTE ADULT - ASSESSMENT
EVAN 1/7/19: no ALINA thrombus, unable to assess LV sys fx  echo 12/7/18: EF 71%, nl LV sys fx, mild diastolic dysfx     a/p    60 year old female with hx of D CHF, HTN, CAD s/p PCI, Afib/ aflutter, s/p Aflutter Ablation 12/2019, COPD, DM2, Anxiety, , raynaud phenomenon presenting with weakness, SOB , hyperkalemia     1. SOB  -likely related to anxiety, COPD   -pulm f/u   -chest xray unremarkable  -covid 19 negative  -po steroids, neb   -no acs , cv stable   -ecg with known RBBB, new twi noted, hyperkalemia also noted  -check echo to eval LV fx     2. CAD s/p PCI   -no cp, no sob  -c/w ASA, statin  -check echo     3. Afib/aflutter s/p  Aflutter Ablation 12/2019  -in NSR, cw cardizem  mg daily  -CHADsVac=2, c/w eliquis     4. Acute on Chronic Diastolic CHF   -no dyspnea. + LE edema on exam  -check echo to eval LV fx  -change lasix to 40 mg IVP daily

## 2020-08-02 NOTE — CONSULT NOTE ADULT - ATTENDING COMMENTS
Patient seen and examined.  Agree with above NP note.  patient is a 60 year old female with hx of D CHF, HTN, CAD s/p PCI, Afib/ aflutter, s/p Aflutter Ablation 12/2019, COPD, DM2, Anxiety, , raynaud phenomenon presenting with weakness, SOB , hyperkalemia   dyspnea likely multifactorial secondary to chronic lung disease, anxiety  no overtly overloaded, but with ++edema  no pulmonary edema  would cont lasix iv daily for now   med/pulmo f/u  cont asa for cad/pci  cont a/c for paf  check echo

## 2020-08-02 NOTE — PHYSICAL THERAPY INITIAL EVALUATION ADULT - MANUAL MUSCLE TESTING RESULTS, REHAB EVAL
BUE 4/5, BLE 3+/5, pt reports LLE slightly weaker, able to SLR, decr strength against resistance L hip 3/5

## 2020-08-02 NOTE — PHYSICAL THERAPY INITIAL EVALUATION ADULT - PLANNED THERAPY INTERVENTIONS, PT EVAL
stair neg: GOAL: pt will neg 6 steps 1 HR ind in 2wks./gait training/transfer training/bed mobility training

## 2020-08-02 NOTE — PROGRESS NOTE ADULT - ATTENDING COMMENTS
Eryn Salazar MD  Division of Hospital Medicine  Cell: [phone]  Pager: [phone]  Office: [phone]

## 2020-08-02 NOTE — PHYSICAL THERAPY INITIAL EVALUATION ADULT - PRECAUTIONS/LIMITATIONS, REHAB EVAL
At that time, she was told by her brother (lives downstairs in home) that she was not herself and did not recognize him which is why she was sent to hospital. Per chart, Dr. Mathew (PCP) has chart notes regarding possible hallucinations. While at [facility] she had head CT reported to be normal and she declined MR brain. Hospital course: +with diminished strength in left hip flexor 3-4/5. +no urinary/bowel incontinence, no saddle anesthesia, no reported fall/trauma, and reports recent normal CT head this week. Patient REFUSES MR imaging for further evaluation at time of medicine admit despite recommendation. Patient however is agreeable to neurology evaluation in the morning. Per chart review: TTE 2018 and EVAN 2019 demonstrate normal LV/RV systolic function (home lasix is for HTN). no peak T wave on admit ekg (does have RBBB, suspect from endstage COPD) fall precautions/At that time, she was told by her brother (lives downstairs in home) that she was not herself and did not recognize him which is why she was sent to hospital. Per chart, Dr. Mathew (PCP) has chart notes regarding possible hallucinations. While at [facility] she had head CT reported to be normal and she declined MR brain. Hospital course: +with diminished strength in left hip flexor 3-4/5. +no urinary/bowel incontinence, no saddle anesthesia, no reported fall/trauma, and reports recent normal CT head this week. Patient REFUSES MR imaging for further evaluation at time of medicine admit despite recommendation. Patient however is agreeable to neurology evaluation in the morning. Per chart review: TTE 2018 and EVAN 2019 demonstrate normal LV/RV systolic function (home lasix is for HTN). no peak T wave on admit ekg (does have RBBB, suspect from endstage COPD)

## 2020-08-02 NOTE — CONSULT NOTE ADULT - CONSULT REASON
SOB weakness  hx HTN, CAD s/p PCI, Afib/ aflutter, s/p Aflutter Ablation 12/2019, COPD, DM2, Anxiety, , raynaud phenomenon

## 2020-08-02 NOTE — CONSULT NOTE ADULT - ASSESSMENT
Cristobal Vega is a 60F w/ end stage COPD on 3LNC (pending transplant list at [facility]), former smoker, CAD s/p stent (2016), afib on eliquis, uncontrolled DM2, raynaud phenomenon admitted generalized weakness and difficulty walking.    Impression: Weakness can be due to myopathy and questionable for steroid myopathy due to pattern of improvement with steroid lowering. No hx of statin use, cocaine use. Must rule out other rheumatological disorders.    Recs:  [] agree with CK though in steroid induced myopathy can be normal. If possible obtain urine CK  [] may benefit from rheumatology workup, SARA, dSDNA, SS-A, SS-B, RF  [] steroid taper as per pulmonology  [] EMG as outpatient would be of benefit  [] to follow up with Dr. Dwight Rosenstein in outpatient setting    Case to be discussed and patient to be seen in AM Cristobal Vega is a 60F w/ end stage COPD on 3LNC (pending transplant list at [facility]), former smoker, CAD s/p stent (2016), afib on eliquis, uncontrolled DM2, raynaud phenomenon admitted generalized weakness and difficulty walking.    Impression: Weakness can be due to myopathy and questionable for steroid myopathy due to pattern of improvement with steroid lowering. No hx of statin use, cocaine use. Must rule out other rheumatological disorders.    Recs:  [] agree with CK though in steroid induced myopathy can be normal. If possible obtain urine CK  [] may benefit from rheumatology workup, SARA, dSDNA, SS-A, SS-B, RF  [] steroid taper as per pulmonology  [] EMG as outpatient would be of benefit  [] to follow up with Dr. Dwight Rosenstein in outpatient setting  [] neurology signing off, recall prn    Case discussed with Dr. Abebe

## 2020-08-02 NOTE — CONSULT NOTE ADULT - SUBJECTIVE AND OBJECTIVE BOX
CARDIOLOGY CONSULT - Dr. Brunson         HPI:  60F w/ end stage COPD on 3LNC (pending transplant list at [facility]), former smoker, CAD s/p stents, aflutter/ afib on eliquis, s/p Aflutter ablation 12/2019, uncontrolled DM2, raynaud phenomenon and recent hospitalization at [facility] for altered mental status and ARTURO presents to [facility] for evaluation of generalized weakness and sob. Patient reports that she was recently hospitalized at [facility] from 7/25-7/31 for confusion and ARTURO. At that time, she was told by her brother (lives downstairs in home) that she was not herself and did not recognize him which is why she was sent to hospital. Per chart, Dr. Mathew (PCP) has chart notes regarding possible hallucinations. While at [facility] she had head CT reported to be normal and she declined MR brain. Her Arturo (Cr increased to 1.5) was reported to resolve with IV fluid. She was discharged to home 1d prior to presentation to [facility] and reports since discharge she has had generalized weakness, inability to walk due to leg heaviness/weakness L>R, and feeling "not hersef"  Patient is known from prior admissions. on exam she is very anxious. She denies Chest pain,  fall, head trauma, seizure, paralysis, loss of sensation in extremities or saddle anesthesia, fever, chills, cp, cough. EVAN 1/7/19: no ALINA thrombus, unable to assess LV sys fx ; echo 12/7/18: EF 71%, nl LV sys fx, mild diastolic dysfx . ROS otherwise negative       PAST MEDICAL & SURGICAL HISTORY:  Atrial fibrillation  Hyperlipemia  Chronic sinusitis  Raynaud phenomenon  HTN (hypertension)  DM (diabetes mellitus)  Claustrophobia  COPD (chronic obstructive pulmonary disease)  S/P tonsillectomy          PREVIOUS DIAGNOSTIC TESTING:    [ ] Echocardiogram:  EVAN 1/7/19: no ALINA thrombus, unable to assess LV sys fx  echo 12/7/18: EF 71%, nl LV sys fx, mild diastolic dysfx     [ ]  Catheterization:  < from: Cardiac Cath Lab - Adult (03.24.17 @ 11:16) >  CONTRAST GIVEN: Omnipaque 42 ml.  MEDICATIONS GIVEN: Verapamil (Isoptin, Calan, Covera), 2.5 mg, IA. Heparin,  3000 units, IA.  VENTRICLES: Global left ventricular function was normal. EF estimated was  65 %.  CORONARY VESSELS: The coronary circulation is right dominant.  LM:   --  Mid left main: There was a 30 % stenosis.  LAD:   --  Ostial LAD: There was a 40 % stenosis.  CX:   --  Circumflex: Normal.  RCA:   --  RCA: Normal.  COMPLICATIONS: There were no complications.  DIAGNOSTIC RECOMMENDATIONS: The patient should continue with the present    < end of copied text >    [ ] Stress Test:  	    MEDICATIONS:  Home Medications:  apixaban 5 mg oral tablet: 1 tab(s) orally every 12 hours (01 Aug 2020 21:52)  aspirin 81 mg oral delayed release tablet: 1 tab(s) orally once a day (01 Aug 2020 21:52)  CoQ10: 200 milligram(s) orally once a day (01 Aug 2020 21:52)  metFORMIN 500 mg oral tablet: 1 tab(s) orally 2 times a day (01 Aug 2020 21:52)  Multiple Vitamins oral tablet: 1 tab(s) orally once a day (01 Aug 2020 21:52)  nitroglycerin 0.4 mg sublingual tablet: 1 tab(s) sublingual every 5 minutes, As Needed (01 Aug 2020 21:52)  Onglyza 5 mg oral tablet: 1 tab(s) orally once a day (01 Aug 2020 21:52)  pantoprazole 40 mg oral delayed release tablet: 1 tab(s) orally once a day (before a meal) (01 Aug 2020 21:52)  predniSONE 20 mg oral tablet: 2 tab(s) orally once a day (01 Aug 2020 21:52)  rosuvastatin 20 mg oral tablet: 1 tab(s) orally once a day (01 Aug 2020 21:52)  Singulair 10 mg oral tablet: 1 tab(s) orally once a day (in the evening) (01 Aug 2020 21:52)  Spiriva 18 mcg inhalation capsule: 1 cap(s) inhaled once a day (01 Aug 2020 21:52)  Symbicort 160 mcg-4.5 mcg/inh inhalation aerosol: 2 puff(s) inhaled 2 times a day (01 Aug 2020 21:52)  theophylline 400 mg/24 hours oral tablet, extended release: 1 tab(s) orally once a day (01 Aug 2020 21:52)  Ventolin HFA 90 mcg/inh inhalation aerosol: 2 puff(s) inhaled 2 times a day (01 Aug 2020 21:52)  Vitamin D3 2000 intl units oral tablet: 1 tab(s) orally once a day (01 Aug 2020 21:52)      MEDICATIONS  (STANDING):  ALBUTerol    90 MICROgram(s) HFA Inhaler 2 Puff(s) Inhalation two times a day  apixaban 5 milliGRAM(s) Oral every 12 hours  aspirin enteric coated 81 milliGRAM(s) Oral daily  atorvastatin 80 milliGRAM(s) Oral at bedtime  budesonide 160 MICROgram(s)/formoterol 4.5 MICROgram(s) Inhaler 2 Puff(s) Inhalation two times a day  cholecalciferol 2000 Unit(s) Oral daily  dextrose 5%. 1000 milliLiter(s) (50 mL/Hr) IV Continuous <Continuous>  dextrose 50% Injectable 12.5 Gram(s) IV Push once  dextrose 50% Injectable 25 Gram(s) IV Push once  dextrose 50% Injectable 25 Gram(s) IV Push once  diltiazem    milliGRAM(s) Oral daily  furosemide    Tablet 40 milliGRAM(s) Oral daily  insulin glargine Injectable (LANTUS) 4 Unit(s) SubCutaneous at bedtime  insulin lispro (HumaLOG) corrective regimen sliding scale   SubCutaneous three times a day before meals  insulin lispro (HumaLOG) corrective regimen sliding scale   SubCutaneous at bedtime  montelukast 10 milliGRAM(s) Oral daily  multivitamin 1 Tablet(s) Oral daily  pantoprazole    Tablet 40 milliGRAM(s) Oral before breakfast  predniSONE   Tablet 40 milliGRAM(s) Oral daily  theophylline ER (24 Hour) 400 milliGRAM(s) Oral daily  tiotropium 18 MICROgram(s) Capsule 1 Capsule(s) Inhalation daily      FAMILY HISTORY:  FH: MI (myocardial infarction)  FH: CABG (coronary artery bypass surgery)      SOCIAL HISTORY:    [x] Former smoker    Allergies    No Known Allergies    Intolerances    albuterol (Unknown)  	    REVIEW OF SYSTEMS:  CONSTITUTIONAL: No fever, weight loss, or fatigue  EYES: No eye pain, visual disturbances, or discharge  ENMT:  No difficulty hearing, tinnitus, vertigo; No sinus or throat pain  NECK: No pain or stiffness  RESPIRATORY: see hpi   CARDIOVASCULAR: No chest pain, palpitations, passing out, dizziness, or leg swelling  GASTROINTESTINAL: No abdominal or epigastric pain. No nausea, vomiting, or hematemesis; No diarrhea or constipation. No melena or hematochezia.  GENITOURINARY: No dysuria, frequency, hematuria, or incontinence  NEUROLOGICAL: No headaches, memory loss, loss of strength, numbness, or tremors  SKIN: No itching, burning, rashes, or lesions   	    [x ] All others negative	  [ ] Unable to obtain    PHYSICAL EXAM:  T(C): 36.3 (08-02-20 @ 08:55), Max: 37 (08-01-20 @ 19:19)  HR: 98 (08-02-20 @ 09:51) (66 - 98)  BP: 152/88 (08-02-20 @ 08:55) (117/80 - 152/88)  RR: 22 (08-02-20 @ 09:51) (18 - 22)  SpO2: 95% (08-02-20 @ 09:51) (95% - 100%)  Wt(kg): --  I&O's Summary    01 Aug 2020 07:01  -  02 Aug 2020 07:00  --------------------------------------------------------  IN: 0 mL / OUT: 850 mL / NET: -850 mL    02 Aug 2020 07:01  -  02 Aug 2020 11:07  --------------------------------------------------------  IN: 360 mL / OUT: 1400 mL / NET: -1040 mL        Appearance: Normal; anxious 	  Psychiatry: A & O x 3, Mood & affect appropriate  HEENT:   Normal oral mucosa, PERRL, EOMI	  Lymphatic: No lymphadenopathy  Cardiovascular: Normal S1 S2,RRR, No JVD, No murmurs  Respiratory: diminished   Gastrointestinal:  Soft, Non-tender, + BS	  Skin: No rashes, No ecchymoses, No cyanosis	  Neurologic: Non-focal  Extremities: bl le edema ++    TELEMETRY: 	    ECG: NSR hr 65 bpm, RBBB, LAFB  lateral TWI,/ ant TWI 	  RADIOLOGY:    Xray Chest 1 View- PORTABLE-Routine (08.01.20 @ 16:44) >  IMPRESSION:    The lungs are clear.  Heart size cannot be accurately assessed in this projection.  No acute bone abnormality.          < end of copied text >    OTHER: 	  	  LABS:	 	    CARDIAC MARKERS:                                  11.1   12.47 )-----------( 282      ( 01 Aug 2020 18:40 )             37.9     08-01    141  |  95<L>  |  38<H>  ----------------------------<  174<H>  6.0<H>   |  38<H>  |  1.00    Ca    9.8      01 Aug 2020 22:14    TPro  6.2  /  Alb  3.8  /  TBili  0.2  /  DBili  x   /  AST  24  /  ALT  21  /  AlkPhos  56  08-01    PT/INR - ( 01 Aug 2020 18:40 )   PT: 11.2 sec;   INR: 0.94 ratio         PTT - ( 01 Aug 2020 18:40 )  PTT:25.1 sec  proBNP:   Lipid Profile:   HgA1c:   TSH:

## 2020-08-02 NOTE — PHYSICAL THERAPY INITIAL EVALUATION ADULT - ADDITIONAL COMMENTS
Pt resides with brother in private house, no steps to enter through garage, +6 steps inside. PTA amb with rollator, incr difficulty lately, reports ind with ADls? (verbal tangents and forgetful at times)- +home O2

## 2020-08-02 NOTE — PROGRESS NOTE ADULT - PROBLEM SELECTOR PLAN 5
uncontrolled DM2 with hyperglycemia  - only on oral agents as outpatient  - currently receiving steroids  - continue [id number] at bedtime with ISS. will likely need to titrate to standing premeal given steroid use

## 2020-08-02 NOTE — PROGRESS NOTE ADULT - ASSESSMENT
60F w/ end stage COPD on 3LNC (pending transplant list at [facility]), former smoker, CAD s/p stent (2016), afib on eliquis, uncontrolled DM2, raynaud phenomenon and recent hospitalization at [facility] for altered mental status and AURORA presents to [facility] for evaluation of generalized weakness and difficulty walking admit to medicine for further evaluation.

## 2020-08-02 NOTE — PHYSICAL THERAPY INITIAL EVALUATION ADULT - PERTINENT HX OF CURRENT PROBLEM, REHAB EVAL
Pt is a 60F admitted to [facility] on 8/1/20 w/ end stage COPD on 3LNC (pending transplant list at [facility]), former smoker, CAD s/p stent (2016), afib on eliquis, uncontrolled DM2, raynaud phenomenon and recent hospitalization at [facility] for AMS and AURORA presents to [facility] for evaluation of generalized weakness and difficulty walking. Pt reports that she was recently hospitalized at [facility] from 7/25-7/31 for confusion and AURORA.

## 2020-08-02 NOTE — PHYSICAL THERAPY INITIAL EVALUATION ADULT - ACTIVE RANGE OF MOTION EXAMINATION, REHAB EVAL
Right UE Active ROM was WFL (within functional limits)/Left UE Active ROM was WFL (within functional limits)/Left LE Active ROM was WFL (within functional limits)/Right LE Active ROM was WFL (within functional limits)/BUE and BLE WFL, +edema b/l ankles/feet

## 2020-08-02 NOTE — PROGRESS NOTE ADULT - SUBJECTIVE AND OBJECTIVE BOX
Patient is a 60y old  Female who presents with a chief complaint of 60F p/w generalized weakness and difficulty walking (01 Aug 2020 22:05)      SUBJECTIVE / OVERNIGHT EVENTS:  no acute events overnight, vss, afebrile  pt complains that her breakfast was not here  She also refuses to work with PT and get blood works  pt complains that she is weak so can't ambulate, also endorses dyspnea    ROS:  14 point ROS negative in detail except stated as above    MEDICATIONS  (STANDING):  ALBUTerol    90 MICROgram(s) HFA Inhaler 2 Puff(s) Inhalation two times a day  apixaban 5 milliGRAM(s) Oral every 12 hours  aspirin enteric coated 81 milliGRAM(s) Oral daily  atorvastatin 80 milliGRAM(s) Oral at bedtime  budesonide 160 MICROgram(s)/formoterol 4.5 MICROgram(s) Inhaler 2 Puff(s) Inhalation two times a day  cholecalciferol 2000 Unit(s) Oral daily  dextrose 5%. 1000 milliLiter(s) (50 mL/Hr) IV Continuous <Continuous>  dextrose 50% Injectable 12.5 Gram(s) IV Push once  dextrose 50% Injectable 25 Gram(s) IV Push once  dextrose 50% Injectable 25 Gram(s) IV Push once  diltiazem    milliGRAM(s) Oral daily  furosemide    Tablet 40 milliGRAM(s) Oral daily  insulin glargine Injectable (LANTUS) 4 Unit(s) SubCutaneous at bedtime  insulin lispro (HumaLOG) corrective regimen sliding scale   SubCutaneous three times a day before meals  insulin lispro (HumaLOG) corrective regimen sliding scale   SubCutaneous at bedtime  montelukast 10 milliGRAM(s) Oral daily  multivitamin 1 Tablet(s) Oral daily  pantoprazole    Tablet 40 milliGRAM(s) Oral before breakfast  predniSONE   Tablet 40 milliGRAM(s) Oral daily  theophylline ER (24 Hour) 400 milliGRAM(s) Oral daily  tiotropium 18 MICROgram(s) Capsule 1 Capsule(s) Inhalation daily    MEDICATIONS  (PRN):  dextrose 40% Gel 15 Gram(s) Oral once PRN Blood Glucose LESS THAN 70 milliGRAM(s)/deciliter  glucagon  Injectable 1 milliGRAM(s) IntraMuscular once PRN Glucose LESS THAN 70 milligrams/deciliter      CAPILLARY BLOOD GLUCOSE      POCT Blood Glucose.: 72 mg/dL (02 Aug 2020 08:12)  POCT Blood Glucose.: 194 mg/dL (02 Aug 2020 01:01)  POCT Blood Glucose.: 130 mg/dL (01 Aug 2020 23:36)    I&O's Summary    01 Aug 2020 07:01  -  02 Aug 2020 07:00  --------------------------------------------------------  IN: 0 mL / OUT: 850 mL / NET: -850 mL    02 Aug 2020 07:01  -  02 Aug 2020 11:20  --------------------------------------------------------  IN: 360 mL / OUT: 1400 mL / NET: -1040 mL        PHYSICAL EXAM:  Vital Signs Last 24 Hrs  T(C): 36.3 (02 Aug 2020 08:55), Max: 37 (01 Aug 2020 19:19)  T(F): 97.4 (02 Aug 2020 08:55), Max: 98.6 (01 Aug 2020 19:19)  HR: 98 (02 Aug 2020 09:51) (66 - 98)  BP: 152/88 (02 Aug 2020 08:55) (117/80 - 152/88)  BP(mean): --  RR: 22 (02 Aug 2020 09:51) (18 - 22)  SpO2: 95% (02 Aug 2020 09:51) (95% - 100%)    GENERAL: NAD, well-developed  HEAD:  Atraumatic, Normocephalic  EYES: EOMI, PERRLA, conjunctiva and sclera clear  NECK: Supple, No JVD  CHEST/LUNG: no exp wheezing, crackles  HEART: Regular rate and rhythm; No murmurs, rubs, or gallops  ABDOMEN: Soft, Nontender, Nondistended; Bowel sounds present  EXTREMITIES:  2+ Peripheral Pulses, No clubbing, cyanosis, or edema  NEUROLOGY: AAOx3; non-focal  SKIN: No rashes or lesions    LABS:  personally reviewed                        11.1   12.47 )-----------( 282      ( 01 Aug 2020 18:40 )             37.9     08    141  |  95<L>  |  38<H>  ----------------------------<  174<H>  6.0<H>   |  38<H>  |  1.00    Ca    9.8      01 Aug 2020 22:14    TPro  6.2  /  Alb  3.8  /  TBili  0.2  /  DBili  x   /  AST  24  /  ALT  21  /  AlkPhos  56      PT/INR - ( 01 Aug 2020 18:40 )   PT: 11.2 sec;   INR: 0.94 ratio         PTT - ( 01 Aug 2020 18:40 )  PTT:25.1 sec      Urinalysis Basic - ( 01 Aug 2020 16:18 )    Color: Light Yellow / Appearance: Clear / S.020 / pH: x  Gluc: x / Ketone: Negative  / Bili: Negative / Urobili: Negative   Blood: x / Protein: 30 mg/dL / Nitrite: Negative   Leuk Esterase: Negative / RBC: 2 /hpf / WBC 3 /HPF   Sq Epi: x / Non Sq Epi: 1 /hpf / Bacteria: Negative        RADIOLOGY & ADDITIONAL TESTS:    Imaging Personally Reviewed:    Consultant(s) Notes Reviewed:      Care Discussed with Consultants/Other Providers:

## 2020-08-02 NOTE — PHYSICAL THERAPY INITIAL EVALUATION ADULT - GENERAL OBSERVATIONS, REHAB EVAL
A&Ox3, forgetful, verbal tangents & anxious at times. Social hx obtained, ROM assessed in supine- pt requesting to eat breakfast, phlebotomy now at bedside.

## 2020-08-02 NOTE — PROGRESS NOTE ADULT - PROBLEM SELECTOR PLAN 1
- report of generalized weakness as well as leg weakness. Exam with diminished strength in left hip flexor 3-4/5.  - no urinary/bowel incontinence, no saddle anesthesia, no reported fall/trauma, and reports recent normal CT head this week. Patient REFUSES MR imaging for further evaluation at time of medicine admit despite recommendation.   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 Outpatient lab work this month with TSH,b12,folate, iron wnl.  - consult neurology  - Patient currently being tapered off of steroids (received IV steroid at [facility]) now on prednisone 40d. Given end-stage COPD would favor conservative steroid taper.  - per chart review: TTE 2018 and EVAN 2019 demonstrate normal LV/RV systolic function (home lasix is for HTN)

## 2020-08-02 NOTE — PROGRESS NOTE ADULT - PROBLEM SELECTOR PLAN 3
- no peak T wave on admit ekg (does have RBBB, suspect from endstage COPD)  - s/p lokelma, lasix  - pt refusing repeat lab at this time. Reattempt in pm

## 2020-08-03 LAB
ALBUMIN SERPL ELPH-MCNC: 3.6 G/DL — SIGNIFICANT CHANGE UP (ref 3.3–5)
ALP SERPL-CCNC: 54 U/L — SIGNIFICANT CHANGE UP (ref 40–120)
ALT FLD-CCNC: 19 U/L — SIGNIFICANT CHANGE UP (ref 10–45)
ANION GAP SERPL CALC-SCNC: 8 MMOL/L — SIGNIFICANT CHANGE UP (ref 5–17)
AST SERPL-CCNC: 21 U/L — SIGNIFICANT CHANGE UP (ref 10–40)
BASE EXCESS BLDA CALC-SCNC: 18.2 MMOL/L — HIGH (ref -2–2)
BASOPHILS # BLD AUTO: 0.01 K/UL — SIGNIFICANT CHANGE UP (ref 0–0.2)
BASOPHILS NFR BLD AUTO: 0.1 % — SIGNIFICANT CHANGE UP (ref 0–2)
BILIRUB SERPL-MCNC: 0.3 MG/DL — SIGNIFICANT CHANGE UP (ref 0.2–1.2)
BUN SERPL-MCNC: 38 MG/DL — HIGH (ref 7–23)
CALCIUM SERPL-MCNC: 9.9 MG/DL — SIGNIFICANT CHANGE UP (ref 8.4–10.5)
CHLORIDE SERPL-SCNC: 90 MMOL/L — LOW (ref 96–108)
CO2 BLDA-SCNC: 48 MMOL/L — HIGH (ref 22–30)
CO2 SERPL-SCNC: 43 MMOL/L — HIGH (ref 22–31)
CREAT SERPL-MCNC: 1.09 MG/DL — SIGNIFICANT CHANGE UP (ref 0.5–1.3)
EOSINOPHIL # BLD AUTO: 0.01 K/UL — SIGNIFICANT CHANGE UP (ref 0–0.5)
EOSINOPHIL NFR BLD AUTO: 0.1 % — SIGNIFICANT CHANGE UP (ref 0–6)
GAS PNL BLDA: SIGNIFICANT CHANGE UP
GLUCOSE BLDC GLUCOMTR-MCNC: 188 MG/DL — HIGH (ref 70–99)
GLUCOSE BLDC GLUCOMTR-MCNC: 198 MG/DL — HIGH (ref 70–99)
GLUCOSE BLDC GLUCOMTR-MCNC: 348 MG/DL — HIGH (ref 70–99)
GLUCOSE BLDC GLUCOMTR-MCNC: 361 MG/DL — HIGH (ref 70–99)
GLUCOSE SERPL-MCNC: 216 MG/DL — HIGH (ref 70–99)
HCO3 BLDA-SCNC: 46 MMOL/L — HIGH (ref 21–29)
HCT VFR BLD CALC: 33 % — LOW (ref 34.5–45)
HGB BLD-MCNC: 9.8 G/DL — LOW (ref 11.5–15.5)
HOROWITZ INDEX BLDA+IHG-RTO: 32 — SIGNIFICANT CHANGE UP
IMM GRANULOCYTES NFR BLD AUTO: 1.5 % — SIGNIFICANT CHANGE UP (ref 0–1.5)
LYMPHOCYTES # BLD AUTO: 0.49 K/UL — LOW (ref 1–3.3)
LYMPHOCYTES # BLD AUTO: 4.6 % — LOW (ref 13–44)
MCHC RBC-ENTMCNC: 26.1 PG — LOW (ref 27–34)
MCHC RBC-ENTMCNC: 29.7 GM/DL — LOW (ref 32–36)
MCV RBC AUTO: 88 FL — SIGNIFICANT CHANGE UP (ref 80–100)
MONOCYTES # BLD AUTO: 0.33 K/UL — SIGNIFICANT CHANGE UP (ref 0–0.9)
MONOCYTES NFR BLD AUTO: 3.1 % — SIGNIFICANT CHANGE UP (ref 2–14)
NEUTROPHILS # BLD AUTO: 9.58 K/UL — HIGH (ref 1.8–7.4)
NEUTROPHILS NFR BLD AUTO: 90.6 % — HIGH (ref 43–77)
NRBC # BLD: 0 /100 WBCS — SIGNIFICANT CHANGE UP (ref 0–0)
PCO2 BLDA: 74 MMHG — CRITICAL HIGH (ref 32–46)
PH BLDA: 7.41 — SIGNIFICANT CHANGE UP (ref 7.35–7.45)
PLATELET # BLD AUTO: 276 K/UL — SIGNIFICANT CHANGE UP (ref 150–400)
PO2 BLDA: 78 MMHG — SIGNIFICANT CHANGE UP (ref 74–108)
POTASSIUM SERPL-MCNC: 5.9 MMOL/L — HIGH (ref 3.5–5.3)
POTASSIUM SERPL-SCNC: 5.9 MMOL/L — HIGH (ref 3.5–5.3)
PROT SERPL-MCNC: 5.8 G/DL — LOW (ref 6–8.3)
RBC # BLD: 3.75 M/UL — LOW (ref 3.8–5.2)
RBC # FLD: 15.1 % — HIGH (ref 10.3–14.5)
RHEUMATOID FACT SERPL-ACNC: <10 IU/ML — SIGNIFICANT CHANGE UP (ref 0–13)
SAO2 % BLDA: 94 % — SIGNIFICANT CHANGE UP (ref 92–96)
SODIUM SERPL-SCNC: 141 MMOL/L — SIGNIFICANT CHANGE UP (ref 135–145)
WBC # BLD: 10.58 K/UL — HIGH (ref 3.8–10.5)
WBC # FLD AUTO: 10.58 K/UL — HIGH (ref 3.8–10.5)

## 2020-08-03 PROCEDURE — 99233 SBSQ HOSP IP/OBS HIGH 50: CPT

## 2020-08-03 RX ORDER — IPRATROPIUM/ALBUTEROL SULFATE 18-103MCG
3 AEROSOL WITH ADAPTER (GRAM) INHALATION EVERY 6 HOURS
Refills: 0 | Status: DISCONTINUED | OUTPATIENT
Start: 2020-08-03 | End: 2020-09-14

## 2020-08-03 RX ORDER — FUROSEMIDE 40 MG
40 TABLET ORAL DAILY
Refills: 0 | Status: DISCONTINUED | OUTPATIENT
Start: 2020-08-04 | End: 2020-08-04

## 2020-08-03 RX ORDER — FUROSEMIDE 40 MG
40 TABLET ORAL ONCE
Refills: 0 | Status: COMPLETED | OUTPATIENT
Start: 2020-08-03 | End: 2020-08-03

## 2020-08-03 RX ADMIN — Medication 400 MILLIGRAM(S): at 11:49

## 2020-08-03 RX ADMIN — Medication 40 MILLIGRAM(S): at 05:12

## 2020-08-03 RX ADMIN — ATORVASTATIN CALCIUM 80 MILLIGRAM(S): 80 TABLET, FILM COATED ORAL at 21:24

## 2020-08-03 RX ADMIN — MONTELUKAST 10 MILLIGRAM(S): 4 TABLET, CHEWABLE ORAL at 11:49

## 2020-08-03 RX ADMIN — PANTOPRAZOLE SODIUM 40 MILLIGRAM(S): 20 TABLET, DELAYED RELEASE ORAL at 05:12

## 2020-08-03 RX ADMIN — Medication 180 MILLIGRAM(S): at 05:12

## 2020-08-03 RX ADMIN — Medication 4: at 13:14

## 2020-08-03 RX ADMIN — Medication 2000 UNIT(S): at 11:48

## 2020-08-03 RX ADMIN — Medication 3 MILLILITER(S): at 11:41

## 2020-08-03 RX ADMIN — APIXABAN 5 MILLIGRAM(S): 2.5 TABLET, FILM COATED ORAL at 05:12

## 2020-08-03 RX ADMIN — Medication 3 MILLILITER(S): at 17:52

## 2020-08-03 RX ADMIN — TIOTROPIUM BROMIDE 1 CAPSULE(S): 18 CAPSULE ORAL; RESPIRATORY (INHALATION) at 11:50

## 2020-08-03 RX ADMIN — Medication 40 MILLIGRAM(S): at 11:41

## 2020-08-03 RX ADMIN — Medication 1: at 08:55

## 2020-08-03 RX ADMIN — ALBUTEROL 2 PUFF(S): 90 AEROSOL, METERED ORAL at 05:14

## 2020-08-03 RX ADMIN — BUDESONIDE AND FORMOTEROL FUMARATE DIHYDRATE 2 PUFF(S): 160; 4.5 AEROSOL RESPIRATORY (INHALATION) at 05:12

## 2020-08-03 RX ADMIN — APIXABAN 5 MILLIGRAM(S): 2.5 TABLET, FILM COATED ORAL at 17:53

## 2020-08-03 RX ADMIN — INSULIN GLARGINE 4 UNIT(S): 100 INJECTION, SOLUTION SUBCUTANEOUS at 21:24

## 2020-08-03 RX ADMIN — BUDESONIDE AND FORMOTEROL FUMARATE DIHYDRATE 2 PUFF(S): 160; 4.5 AEROSOL RESPIRATORY (INHALATION) at 17:55

## 2020-08-03 RX ADMIN — Medication 1 TABLET(S): at 11:48

## 2020-08-03 RX ADMIN — Medication 81 MILLIGRAM(S): at 11:49

## 2020-08-03 RX ADMIN — Medication 5: at 17:55

## 2020-08-03 NOTE — PROGRESS NOTE ADULT - PROBLEM SELECTOR PLAN 3
- no peak T wave on admit ekg (does have RBBB, suspect from endstage COPD)  - s/p lokelma, lasix  - K is at 5.5, continue to monitor

## 2020-08-03 NOTE — PROGRESS NOTE ADULT - SUBJECTIVE AND OBJECTIVE BOX
CARDIOLOGY FOLLOW UP - Dr. Brunson    CC no cp  c/o SOB, NIELSON       PHYSICAL EXAM:  T(C): 36.6 (08-03-20 @ 08:55), Max: 36.8 (08-02-20 @ 14:30)  HR: 92 (08-03-20 @ 08:55) (92 - 103)  BP: 129/78 (08-03-20 @ 08:55) (112/71 - 135/62)  RR: 18 (08-03-20 @ 08:55) (18 - 21)  SpO2: 96% (08-03-20 @ 08:55) (95% - 97%)  Wt(kg): --  I&O's Summary    02 Aug 2020 07:01  -  03 Aug 2020 07:00  --------------------------------------------------------  IN: 1060 mL / OUT: 3000 mL / NET: -1940 mL        Appearance: Normal	  Cardiovascular: Normal S1 S2,RRR, No JVD, No murmurs  Respiratory: diminished   Gastrointestinal:  Soft, Non-tender, + BS	, +abd distention   Extremities: Normal range of motion, No clubbing, +b/l upper, lower extremity edema       MEDICATIONS  (STANDING):  ALBUTerol    90 MICROgram(s) HFA Inhaler 2 Puff(s) Inhalation two times a day  apixaban 5 milliGRAM(s) Oral every 12 hours  aspirin enteric coated 81 milliGRAM(s) Oral daily  atorvastatin 80 milliGRAM(s) Oral at bedtime  budesonide 160 MICROgram(s)/formoterol 4.5 MICROgram(s) Inhaler 2 Puff(s) Inhalation two times a day  cholecalciferol 2000 Unit(s) Oral daily  dextrose 5%. 1000 milliLiter(s) (50 mL/Hr) IV Continuous <Continuous>  dextrose 50% Injectable 12.5 Gram(s) IV Push once  dextrose 50% Injectable 25 Gram(s) IV Push once  dextrose 50% Injectable 25 Gram(s) IV Push once  diltiazem    milliGRAM(s) Oral daily  furosemide   Injectable 40 milliGRAM(s) IV Push once  furosemide   Injectable 40 milliGRAM(s) IV Push daily  insulin glargine Injectable (LANTUS) 4 Unit(s) SubCutaneous at bedtime  insulin lispro (HumaLOG) corrective regimen sliding scale   SubCutaneous three times a day before meals  insulin lispro (HumaLOG) corrective regimen sliding scale   SubCutaneous at bedtime  montelukast 10 milliGRAM(s) Oral daily  multivitamin 1 Tablet(s) Oral daily  pantoprazole    Tablet 40 milliGRAM(s) Oral before breakfast  predniSONE   Tablet 40 milliGRAM(s) Oral daily  theophylline ER (24 Hour) 400 milliGRAM(s) Oral daily  tiotropium 18 MICROgram(s) Capsule 1 Capsule(s) Inhalation daily      TELEMETRY: 	    ECG:  	  RADIOLOGY:   DIAGNOSTIC TESTING:  [ ] Echocardiogram:  [ ]  Catheterization:  [ ] Stress Test:    OTHER: 	    LABS:	 	                                9.8    10.58 )-----------( 276      ( 03 Aug 2020 09:22 )             33.0     08-03    141  |  90<L>  |  38<H>  ----------------------------<  216<H>  5.9<H>   |  43<H>  |  1.09    Ca    9.9      03 Aug 2020 09:22    TPro  5.8<L>  /  Alb  3.6  /  TBili  0.3  /  DBili  x   /  AST  21  /  ALT  19  /  AlkPhos  54  08-03    PT/INR - ( 01 Aug 2020 18:40 )   PT: 11.2 sec;   INR: 0.94 ratio         PTT - ( 01 Aug 2020 18:40 )  PTT:25.1 sec

## 2020-08-03 NOTE — CONSULT NOTE ADULT - SUBJECTIVE AND OBJECTIVE BOX
CHIEF COMPLAINT: generalized weakness and dyspnea    HPI:  61 y/o F with PMH of COPD on home oxygen 4L NC (Pulmonary: Dr. Anita Mathew), AF on Eliquis, CAD s/p PCI recently hospitalized at [facility] for AMS and AURORA.  At time of discharge, she was started on a steroid taper and sent home.  She noted that she had worsening LE weakness and returned to the hospital.  She also is endorsing worsening shortness of breath.  She denies cough or sputum production.  No sick contacts or recent travel.  She was COVID-19 negative on admission.    Of note, while at [facility] she had head CT reported to be normal and she declined MR brain. Her AURORA (Cr increased to 1.5) was reported to resolve with IV fluid. She was discharged to home 1d prior to presentation to [facility] and reports since discharge she has had generalized weakness, inability to walk due to leg heaviness/weakness L>R, and sensation that she has retained fluid in her legs.       PAST MEDICAL & SURGICAL HISTORY:  Atrial fibrillation  Hyperlipemia  Chronic sinusitis  Raynaud phenomenon  HTN (hypertension)  DM (diabetes mellitus)  Claustrophobia  COPD (chronic obstructive pulmonary disease)  S/P tonsillectomy      FAMILY HISTORY:  FH: MI (myocardial infarction)  FH: CABG (coronary artery bypass surgery)      SOCIAL HISTORY:  Smoking: _1_ packs x _35__ years; quit 6 years ago  EtOH Use: denies  Recent Travel: denies  Advance Directives: full-code    Allergies    No Known Allergies    Intolerances    albuterol (Unknown)      HOME MEDICATIONS:    REVIEW OF SYSTEMS:  Constitutional: AAOx3  Eyes:   ENT:  CV:  Resp: shortness of breath  GI:  :  MSK:  Integumentary:  Neurological:  Psychiatric:  Endocrine:  Hematologic/Lymphatic:  Allergic/Immunologic:  [X] All other systems negative  [ ] Unable to assess ROS because ________    OBJECTIVE:  ICU Vital Signs Last 24 Hrs  T(C): 36.6 (03 Aug 2020 08:55), Max: 36.8 (02 Aug 2020 14:30)  T(F): 97.8 (03 Aug 2020 08:55), Max: 98.2 (02 Aug 2020 14:30)  HR: 92 (03 Aug 2020 08:55) (92 - 103)  BP: 129/78 (03 Aug 2020 08:55) (112/71 - 135/62)  BP(mean): --  ABP: --  ABP(mean): --  RR: 18 (03 Aug 2020 08:55) (18 - 21)  SpO2: 96% (03 Aug 2020 08:55) (95% - 97%)        08-02 @ 07:01  -  08-03 @ 07:00  --------------------------------------------------------  IN: 1060 mL / OUT: 3000 mL / NET: -1940 mL      CAPILLARY BLOOD GLUCOSE      POCT Blood Glucose.: 188 mg/dL (03 Aug 2020 08:47)      PHYSICAL EXAM:  General:  AAOx3  HEENT: EOMI, PERRL  Lymph Nodes: No LAD  Neck: JVP 6 cm  Respiratory: absent breath sounds bilaterally  Cardiovascular: RRR, no m/r/g  Abdomen: soft, NT/ND, no HSM  Extremities: LE edema 1+  Skin: nl. skin turgor  Neurological: no deficitis      HOSPITAL MEDICATIONS:  MEDICATIONS  (STANDING):  ALBUTerol    90 MICROgram(s) HFA Inhaler 2 Puff(s) Inhalation two times a day  albuterol/ipratropium for Nebulization 3 milliLiter(s) Nebulizer every 6 hours  apixaban 5 milliGRAM(s) Oral every 12 hours  aspirin enteric coated 81 milliGRAM(s) Oral daily  atorvastatin 80 milliGRAM(s) Oral at bedtime  budesonide 160 MICROgram(s)/formoterol 4.5 MICROgram(s) Inhaler 2 Puff(s) Inhalation two times a day  cholecalciferol 2000 Unit(s) Oral daily  dextrose 5%. 1000 milliLiter(s) (50 mL/Hr) IV Continuous <Continuous>  dextrose 50% Injectable 12.5 Gram(s) IV Push once  dextrose 50% Injectable 25 Gram(s) IV Push once  dextrose 50% Injectable 25 Gram(s) IV Push once  diltiazem    milliGRAM(s) Oral daily  furosemide   Injectable 40 milliGRAM(s) IV Push once  furosemide   Injectable 40 milliGRAM(s) IV Push daily  insulin glargine Injectable (LANTUS) 4 Unit(s) SubCutaneous at bedtime  insulin lispro (HumaLOG) corrective regimen sliding scale   SubCutaneous three times a day before meals  insulin lispro (HumaLOG) corrective regimen sliding scale   SubCutaneous at bedtime  montelukast 10 milliGRAM(s) Oral daily  multivitamin 1 Tablet(s) Oral daily  pantoprazole    Tablet 40 milliGRAM(s) Oral before breakfast  predniSONE   Tablet 40 milliGRAM(s) Oral daily  theophylline ER (24 Hour) 400 milliGRAM(s) Oral daily  tiotropium 18 MICROgram(s) Capsule 1 Capsule(s) Inhalation daily    MEDICATIONS  (PRN):  dextrose 40% Gel 15 Gram(s) Oral once PRN Blood Glucose LESS THAN 70 milliGRAM(s)/deciliter  glucagon  Injectable 1 milliGRAM(s) IntraMuscular once PRN Glucose LESS THAN 70 milligrams/deciliter      LABS:                        9.8    10.58 )-----------( 276      ( 03 Aug 2020 09:22 )             33.0     08-03    141  |  90<L>  |  38<H>  ----------------------------<  216<H>  5.9<H>   |  43<H>  |  1.09    Ca    9.9      03 Aug 2020 09:22    TPro  5.8<L>  /  Alb  3.6  /  TBili  0.3  /  DBili  x   /  AST  21  /  ALT  19  /  AlkPhos  54  08-03    PT/INR - ( 01 Aug 2020 18:40 )   PT: 11.2 sec;   INR: 0.94 ratio         PTT - ( 01 Aug 2020 18:40 )  PTT:25.1 sec  Urinalysis Basic - ( 01 Aug 2020 16:18 )    Color: Light Yellow / Appearance: Clear / S.020 / pH: x  Gluc: x / Ketone: Negative  / Bili: Negative / Urobili: Negative   Blood: x / Protein: 30 mg/dL / Nitrite: Negative   Leuk Esterase: Negative / RBC: 2 /hpf / WBC 3 /HPF   Sq Epi: x / Non Sq Epi: 1 /hpf / Bacteria: Negative      MICROBIOLOGY:   COVID negative  UCXs negative    RADIOLOGY:  [x] Reviewed and interpreted by me

## 2020-08-03 NOTE — PROGRESS NOTE ADULT - SUBJECTIVE AND OBJECTIVE BOX
Patient is a 60y old  Female who presents with a chief complaint of 60F p/w generalized weakness and difficulty walking (02 Aug 2020 13:29)      SUBJECTIVE / OVERNIGHT EVENTS: patient reports "swelling all over" and feeling weak, asking to be taken off prednisone    MEDICATIONS  (STANDING):  ALBUTerol    90 MICROgram(s) HFA Inhaler 2 Puff(s) Inhalation two times a day  apixaban 5 milliGRAM(s) Oral every 12 hours  aspirin enteric coated 81 milliGRAM(s) Oral daily  atorvastatin 80 milliGRAM(s) Oral at bedtime  budesonide 160 MICROgram(s)/formoterol 4.5 MICROgram(s) Inhaler 2 Puff(s) Inhalation two times a day  cholecalciferol 2000 Unit(s) Oral daily  dextrose 5%. 1000 milliLiter(s) (50 mL/Hr) IV Continuous <Continuous>  dextrose 50% Injectable 12.5 Gram(s) IV Push once  dextrose 50% Injectable 25 Gram(s) IV Push once  dextrose 50% Injectable 25 Gram(s) IV Push once  diltiazem    milliGRAM(s) Oral daily  furosemide    Tablet 40 milliGRAM(s) Oral daily  insulin glargine Injectable (LANTUS) 4 Unit(s) SubCutaneous at bedtime  insulin lispro (HumaLOG) corrective regimen sliding scale   SubCutaneous three times a day before meals  insulin lispro (HumaLOG) corrective regimen sliding scale   SubCutaneous at bedtime  montelukast 10 milliGRAM(s) Oral daily  multivitamin 1 Tablet(s) Oral daily  pantoprazole    Tablet 40 milliGRAM(s) Oral before breakfast  predniSONE   Tablet 40 milliGRAM(s) Oral daily  theophylline ER (24 Hour) 400 milliGRAM(s) Oral daily  tiotropium 18 MICROgram(s) Capsule 1 Capsule(s) Inhalation daily    MEDICATIONS  (PRN):  dextrose 40% Gel 15 Gram(s) Oral once PRN Blood Glucose LESS THAN 70 milliGRAM(s)/deciliter  glucagon  Injectable 1 milliGRAM(s) IntraMuscular once PRN Glucose LESS THAN 70 milligrams/deciliter      Vital Signs Last 24 Hrs  T(C): 36.6 (03 Aug 2020 08:55), Max: 36.8 (02 Aug 2020 14:30)  T(F): 97.8 (03 Aug 2020 08:55), Max: 98.2 (02 Aug 2020 14:30)  HR: 92 (03 Aug 2020 08:55) (92 - 103)  BP: 129/78 (03 Aug 2020 08:55) (112/71 - 135/62)  BP(mean): --  RR: 18 (03 Aug 2020 08:55) (18 - 21)  SpO2: 96% (03 Aug 2020 08:55) (95% - 97%)  CAPILLARY BLOOD GLUCOSE      POCT Blood Glucose.: 188 mg/dL (03 Aug 2020 08:47)  POCT Blood Glucose.: 305 mg/dL (02 Aug 2020 21:31)  POCT Blood Glucose.: 359 mg/dL (02 Aug 2020 16:54)  POCT Blood Glucose.: 410 mg/dL (02 Aug 2020 16:52)  POCT Blood Glucose.: 259 mg/dL (02 Aug 2020 12:08)    I&O's Summary    02 Aug 2020 07:01  -  03 Aug 2020 07:00  --------------------------------------------------------  IN: 1060 mL / OUT: 3000 mL / NET: -1940 mL      PHYSICAL EXAM:  GENERAL: frail appearing, NAD   EYES:  conjunctiva and sclera clear  CHEST/LUNG: Clear to auscultation bilaterally; No wheeze  HEART: +S1/S2, reg   ABDOMEN: Soft, Nontender, Nondistended  EXTREMITIES:  trace peripheral edema   PSYCH: AAOx3      LABS:                        9.8    10.58 )-----------( 276      ( 03 Aug 2020 09:22 )             33.0     08-02    140  |  91<L>  |  39<H>  ----------------------------<  338<H>  5.5<H>   |  42<H>  |  1.24    Ca    9.5      02 Aug 2020 21:43    TPro  6.2  /  Alb  3.8  /  TBili  0.2  /  DBili  x   /  AST  24  /  ALT  21  /  AlkPhos  56  08-01    PT/INR - ( 01 Aug 2020 18:40 )   PT: 11.2 sec;   INR: 0.94 ratio         PTT - ( 01 Aug 2020 18:40 )  PTT:25.1 sec      Urinalysis Basic - ( 01 Aug 2020 16:18 )    Color: Light Yellow / Appearance: Clear / S.020 / pH: x  Gluc: x / Ketone: Negative  / Bili: Negative / Urobili: Negative   Blood: x / Protein: 30 mg/dL / Nitrite: Negative   Leuk Esterase: Negative / RBC: 2 /hpf / WBC 3 /HPF   Sq Epi: x / Non Sq Epi: 1 /hpf / Bacteria: Negative

## 2020-08-03 NOTE — PROGRESS NOTE ADULT - ASSESSMENT
EVAN 1/7/19: no ALINA thrombus, unable to assess LV sys fx  echo 12/7/18: EF 71%, nl LV sys fx, mild diastolic dysfx     a/p    60 year old female with hx of D CHF, HTN, CAD s/p PCI, Afib/ aflutter, s/p Aflutter Ablation 12/2019, COPD, DM2, Anxiety, , raynaud phenomenon presenting with weakness, SOB , hyperkalemia     1. SOB  -likely related to anxiety, COPD   -pulm f/u   -chest xray unremarkable  -covid 19 negative  -po steroids, neb   -no acs , cv stable   -ecg with known RBBB, new twi noted, hyperkalemia also noted  -check echo to eval LV fx     2. CAD s/p PCI   -no cp, no sob  -c/w ASA, statin  -check echo     3. Afib/aflutter s/p  Aflutter Ablation 12/2019  -in NSR, cw cardizem  mg daily  -CHADsVac=2, c/w eliquis     4. Acute on Chronic Diastolic CHF   -+dyspnea. + LE/upper edema on exam  -check echo to eval LV fx  -pt. received PO lasix this am, give 40mg IVP lasix x 1 today  -start lasix 40mg IV daily     5. COPD   management per pulm.

## 2020-08-03 NOTE — PROGRESS NOTE ADULT - PROBLEM SELECTOR PLAN 4
- does not appear to have infection or sepsis at time of medicine admit  - likely related to steroid use  - unclear if relation to weakness  - monitor off antibiotics  - overall downtrending

## 2020-08-03 NOTE — PROGRESS NOTE ADULT - PROBLEM SELECTOR PLAN 2
- for now c/w prednisone 40mg d, spiriva, symbicort, ventolin, theophyline  - on 3LNC at baseline  - at time of medicine admit is not in exacerbation, CXR does not demonstrate lobar opacification c/w pneumonia, COVID19 PCR not detected (8/1/20)  -pulm consult placed

## 2020-08-03 NOTE — PROGRESS NOTE ADULT - PROBLEM SELECTOR PLAN 5
uncontrolled DM2 with hyperglycemia  - only on oral agents as outpatient  - currently receiving steroids  - continue [id number] at bedtime with ISS. will likely need to titrate to standing premeal given steroid use

## 2020-08-03 NOTE — PROGRESS NOTE ADULT - ATTENDING COMMENTS
Agree with above NP note.  dyspnea likely secondary to chronic lung disease  likely some component of volume overloaded  trial of iv lasix  plumo f/u  steroids, nebz  check echo

## 2020-08-03 NOTE — CONSULT NOTE ADULT - ATTENDING COMMENTS
61 y/o F with PMH of COPD on home oxygen 4L NC (Pulmonary: Dr. Anita Mathew), AF on Eliquis, CAD s/p PCI recently hospitalized at [facility] for AMS and AURORA.  At discharge, she was sent home on prednisone and returns with complaints of LE edema.   She was noted to have increased WOB and pulmonary was consulted for further management.    1.  Acute on chronic hypoxemic respiratory failure-  -Etiology: acute exacerbation of COPD  -Clinically, patient has increased WOB and is not moving air on exam  -She is on 4L NC saturating 95% and is using accessory muscle to breathe  -CXR: no infiltrates or consolidation was noted  -Would start patient on Duonebs q6 standing  -Continue prednisone 40mg QD for now but would favor weaning once she improves as there is a concern for a steroid induced myopathy  -Cont Symbicort and Sprirva  -Place patient on BiPAP at 12/8 at 50% with goal oxygen saturation >92%  -Check an RVP to complete infectious work up    Parminder Carranza, DO 61 y/o F with PMH of COPD on home oxygen 4L NC (Pulmonary: Dr. Anita Mathew), AF on Eliquis, CAD s/p PCI recently hospitalized at [facility] for AMS and AURORA.  At discharge, she was sent home on prednisone and returns with complaints of LE edema.   She was noted to have increased WOB and pulmonary was consulted for further management.    1.  Acute on chronic hypoxemic/hypercapnic respiratory failure-  -Etiology: acute exacerbation of COPD  -Clinically, patient has increased WOB and is not moving air on exam  -She is on 4L NC saturating 95% and is using accessory muscle to breathe  -CXR: no infiltrates or consolidation was noted  -Would start patient on Duonebs q6 standing  -Continue prednisone 40mg QD for now but would favor weaning once she improves as there is a concern for a steroid induced myopathy  -Cont Symbicort and Sprirva  -Place patient on BiPAP at 12/8 at 50% with goal oxygen saturation >92%  -Check an RVP to complete infectious work up    Parminder Carranza, DO

## 2020-08-03 NOTE — PROGRESS NOTE ADULT - ASSESSMENT
60F w/ end stage COPD on 3LNC (pending transplant list at [facility]), former smoker, CAD s/p stent (2016), afib on eliquis, uncontrolled DM2, raynaud phenomenon and recent hospitalization at [facility] for altered mental status and AURORA presents to [facility] for evaluation of generalized weakness and difficulty walking admit to medicine for further evaluation.

## 2020-08-04 DIAGNOSIS — J96.20 ACUTE AND CHRONIC RESPIRATORY FAILURE, UNSPECIFIED WHETHER WITH HYPOXIA OR HYPERCAPNIA: ICD-10-CM

## 2020-08-04 LAB
ANA TITR SER: NEGATIVE — SIGNIFICANT CHANGE UP
ANION GAP SERPL CALC-SCNC: 5 MMOL/L — SIGNIFICANT CHANGE UP (ref 5–17)
BASOPHILS # BLD AUTO: 0.01 K/UL — SIGNIFICANT CHANGE UP (ref 0–0.2)
BASOPHILS NFR BLD AUTO: 0.1 % — SIGNIFICANT CHANGE UP (ref 0–2)
BUN SERPL-MCNC: 38 MG/DL — HIGH (ref 7–23)
CALCIUM SERPL-MCNC: 9.5 MG/DL — SIGNIFICANT CHANGE UP (ref 8.4–10.5)
CHLORIDE SERPL-SCNC: 89 MMOL/L — LOW (ref 96–108)
CO2 SERPL-SCNC: 45 MMOL/L — CRITICAL HIGH (ref 22–31)
CREAT SERPL-MCNC: 1.11 MG/DL — SIGNIFICANT CHANGE UP (ref 0.5–1.3)
EOSINOPHIL # BLD AUTO: 0.13 K/UL — SIGNIFICANT CHANGE UP (ref 0–0.5)
EOSINOPHIL NFR BLD AUTO: 1.4 % — SIGNIFICANT CHANGE UP (ref 0–6)
GLUCOSE BLDC GLUCOMTR-MCNC: 296 MG/DL — HIGH (ref 70–99)
GLUCOSE BLDC GLUCOMTR-MCNC: 303 MG/DL — HIGH (ref 70–99)
GLUCOSE BLDC GLUCOMTR-MCNC: 338 MG/DL — HIGH (ref 70–99)
GLUCOSE BLDC GLUCOMTR-MCNC: 390 MG/DL — HIGH (ref 70–99)
GLUCOSE BLDC GLUCOMTR-MCNC: 428 MG/DL — HIGH (ref 70–99)
GLUCOSE BLDC GLUCOMTR-MCNC: 431 MG/DL — HIGH (ref 70–99)
GLUCOSE SERPL-MCNC: 123 MG/DL — HIGH (ref 70–99)
HCT VFR BLD CALC: 31.8 % — LOW (ref 34.5–45)
HGB BLD-MCNC: 9.4 G/DL — LOW (ref 11.5–15.5)
IMM GRANULOCYTES NFR BLD AUTO: 1.4 % — SIGNIFICANT CHANGE UP (ref 0–1.5)
LYMPHOCYTES # BLD AUTO: 1.95 K/UL — SIGNIFICANT CHANGE UP (ref 1–3.3)
LYMPHOCYTES # BLD AUTO: 20.3 % — SIGNIFICANT CHANGE UP (ref 13–44)
MCHC RBC-ENTMCNC: 26 PG — LOW (ref 27–34)
MCHC RBC-ENTMCNC: 29.6 GM/DL — LOW (ref 32–36)
MCV RBC AUTO: 88.1 FL — SIGNIFICANT CHANGE UP (ref 80–100)
MONOCYTES # BLD AUTO: 1.29 K/UL — HIGH (ref 0–0.9)
MONOCYTES NFR BLD AUTO: 13.5 % — SIGNIFICANT CHANGE UP (ref 2–14)
NEUTROPHILS # BLD AUTO: 6.08 K/UL — SIGNIFICANT CHANGE UP (ref 1.8–7.4)
NEUTROPHILS NFR BLD AUTO: 63.3 % — SIGNIFICANT CHANGE UP (ref 43–77)
NRBC # BLD: 0 /100 WBCS — SIGNIFICANT CHANGE UP (ref 0–0)
PLATELET # BLD AUTO: 277 K/UL — SIGNIFICANT CHANGE UP (ref 150–400)
POTASSIUM SERPL-MCNC: 4 MMOL/L — SIGNIFICANT CHANGE UP (ref 3.5–5.3)
POTASSIUM SERPL-SCNC: 4 MMOL/L — SIGNIFICANT CHANGE UP (ref 3.5–5.3)
RBC # BLD: 3.61 M/UL — LOW (ref 3.8–5.2)
RBC # FLD: 15.1 % — HIGH (ref 10.3–14.5)
SODIUM SERPL-SCNC: 139 MMOL/L — SIGNIFICANT CHANGE UP (ref 135–145)
WBC # BLD: 9.59 K/UL — SIGNIFICANT CHANGE UP (ref 3.8–10.5)
WBC # FLD AUTO: 9.59 K/UL — SIGNIFICANT CHANGE UP (ref 3.8–10.5)

## 2020-08-04 PROCEDURE — 99233 SBSQ HOSP IP/OBS HIGH 50: CPT

## 2020-08-04 RX ORDER — INSULIN LISPRO 100/ML
3 VIAL (ML) SUBCUTANEOUS
Refills: 0 | Status: DISCONTINUED | OUTPATIENT
Start: 2020-08-04 | End: 2020-08-09

## 2020-08-04 RX ORDER — INSULIN GLARGINE 100 [IU]/ML
10 INJECTION, SOLUTION SUBCUTANEOUS AT BEDTIME
Refills: 0 | Status: DISCONTINUED | OUTPATIENT
Start: 2020-08-04 | End: 2020-08-09

## 2020-08-04 RX ADMIN — Medication 6: at 13:49

## 2020-08-04 RX ADMIN — Medication 180 MILLIGRAM(S): at 06:18

## 2020-08-04 RX ADMIN — Medication 1: at 21:24

## 2020-08-04 RX ADMIN — Medication 40 MILLIGRAM(S): at 06:18

## 2020-08-04 RX ADMIN — Medication 3 MILLILITER(S): at 06:24

## 2020-08-04 RX ADMIN — Medication 3 UNIT(S): at 13:49

## 2020-08-04 RX ADMIN — PANTOPRAZOLE SODIUM 40 MILLIGRAM(S): 20 TABLET, DELAYED RELEASE ORAL at 06:18

## 2020-08-04 RX ADMIN — Medication 3 MILLILITER(S): at 11:45

## 2020-08-04 RX ADMIN — APIXABAN 5 MILLIGRAM(S): 2.5 TABLET, FILM COATED ORAL at 06:18

## 2020-08-04 RX ADMIN — Medication 400 MILLIGRAM(S): at 12:59

## 2020-08-04 RX ADMIN — Medication 2000 UNIT(S): at 11:46

## 2020-08-04 RX ADMIN — ATORVASTATIN CALCIUM 80 MILLIGRAM(S): 80 TABLET, FILM COATED ORAL at 21:23

## 2020-08-04 RX ADMIN — Medication 1 TABLET(S): at 11:46

## 2020-08-04 RX ADMIN — Medication 4: at 17:57

## 2020-08-04 RX ADMIN — Medication 3 MILLILITER(S): at 17:56

## 2020-08-04 RX ADMIN — MONTELUKAST 10 MILLIGRAM(S): 4 TABLET, CHEWABLE ORAL at 12:59

## 2020-08-04 RX ADMIN — BUDESONIDE AND FORMOTEROL FUMARATE DIHYDRATE 2 PUFF(S): 160; 4.5 AEROSOL RESPIRATORY (INHALATION) at 06:24

## 2020-08-04 RX ADMIN — Medication 81 MILLIGRAM(S): at 11:46

## 2020-08-04 RX ADMIN — BUDESONIDE AND FORMOTEROL FUMARATE DIHYDRATE 2 PUFF(S): 160; 4.5 AEROSOL RESPIRATORY (INHALATION) at 17:55

## 2020-08-04 RX ADMIN — INSULIN GLARGINE 10 UNIT(S): 100 INJECTION, SOLUTION SUBCUTANEOUS at 21:24

## 2020-08-04 RX ADMIN — TIOTROPIUM BROMIDE 1 CAPSULE(S): 18 CAPSULE ORAL; RESPIRATORY (INHALATION) at 11:45

## 2020-08-04 RX ADMIN — Medication 4: at 09:36

## 2020-08-04 RX ADMIN — Medication 3 UNIT(S): at 17:57

## 2020-08-04 RX ADMIN — APIXABAN 5 MILLIGRAM(S): 2.5 TABLET, FILM COATED ORAL at 17:56

## 2020-08-04 NOTE — PROGRESS NOTE ADULT - PROBLEM SELECTOR PLAN 4
- no peak T wave on admit ekg (does have RBBB, suspect from endstage COPD)  - s/p lokelma, lasix  - resolved

## 2020-08-04 NOTE — PROGRESS NOTE ADULT - PROBLEM SELECTOR PLAN 1
-from COPD exacerbation  -placed on BiPAP overnight, breathing has clinically improved  -continue with duonebs, symbicort and spiriva   -f/u pulm recs   -continuing steroids for now

## 2020-08-04 NOTE — PROGRESS NOTE ADULT - ASSESSMENT
60F w/ end stage COPD on 3LNC (pending transplant list at [facility]), former smoker, CAD s/p stent (2016), afib on eliquis, uncontrolled DM2, raynaud phenomenon and recent hospitalization at [facility] for altered mental status and AURORA presents to [facility] for evaluation of generalized weakness and difficulty walking admit to medicine for further evaluation.

## 2020-08-04 NOTE — PROGRESS NOTE ADULT - PROBLEM SELECTOR PLAN 6
uncontrolled DM2 with hyperglycemia  - only on oral agents as outpatient  - insulin uptitrated for hyperglycemia in the setting of steroid use

## 2020-08-04 NOTE — PROGRESS NOTE ADULT - ATTENDING COMMENTS
59 y/o F with PMH of COPD on home oxygen 4L NC (Pulmonary: Dr. Anita Mathew), AF on Eliquis, CAD s/p PCI recently hospitalized at [facility] for AMS and AURORA.  At discharge, she was sent home on prednisone and returns with complaints of LE edema.   She was noted to have increased WOB and pulmonary was consulted for further management.    1.  Acute on chronic hypoxemic/hypercapnic respiratory failure-  -Etiology: acute exacerbation of COPD  -Clinically, the patient is feeling improved with the use of NIPPV  -She is on 4L NC saturating 95% and moving air better today than yesterday  -CXR: no infiltrates or consolidation was noted  -Cont Duonebs q6 standing and prednisone 40mg QD for now   -Cont Symbicort and Sprirva  -Cont BiPAP at 12/5 at 50% with goal oxygen saturation >92%  -Would check RVP to complete infectious work up  -Of note, patients serum bicarbonate is 45, would favor holding lasix as she has e/o a contraction metabolic alkalosis--if suspected to still be overloaded, can consider acetazolamide     Parminder Carranza DO .

## 2020-08-04 NOTE — PROGRESS NOTE ADULT - ASSESSMENT
EVAN 1/7/19: no ALINA thrombus, unable to assess LV sys fx  echo 12/7/18: EF 71%, nl LV sys fx, mild diastolic dysfx     a/p    60 year old female with hx of D CHF, HTN, CAD s/p PCI, Afib/ aflutter, s/p Aflutter Ablation 12/2019, COPD, DM2, Anxiety, , raynaud phenomenon presenting with weakness, SOB , hyperkalemia     1. SOB  -COPD   -pulm f/u   -chest xray unremarkable  -covid 19 negative  -po steroids, neb   -no acs , cv stable   -ecg with known RBBB, new twi noted, hyperkalemia also noted  -check echo to eval LV fx     2. CAD s/p PCI   -no cp, no sob  -c/w ASA, statin  -check echo     3. Afib/aflutter s/p  Aflutter Ablation 12/2019  -in NSR, cw cardizem  mg daily  -CHADsVac=2, c/w eliquis     4. Acute on Chronic Diastolic CHF   -+dyspnea. + LE/upper edema on exam  -check echo to eval LV fx  -cont lasix 40mg IV daily     5. COPD   management per pulm.

## 2020-08-04 NOTE — PROGRESS NOTE ADULT - SUBJECTIVE AND OBJECTIVE BOX
CC: still sob, on BIPAP    TELEMETRY:     PHYSICAL EXAM:    T(C): 36.8 (08-04-20 @ 09:26), Max: 36.8 (08-04-20 @ 09:26)  HR: 82 (08-04-20 @ 09:26) (82 - 99)  BP: 158/115 (08-04-20 @ 09:26) (129/73 - 158/115)  RR: 18 (08-04-20 @ 09:26) (18 - 18)  SpO2: 100% (08-04-20 @ 09:26) (97% - 100%)  Wt(kg): --  I&O's Summary    03 Aug 2020 07:01  -  04 Aug 2020 07:00  --------------------------------------------------------  IN: 950 mL / OUT: 400 mL / NET: 550 mL    04 Aug 2020 07:01  -  04 Aug 2020 10:51  --------------------------------------------------------  IN: 450 mL / OUT: 0 mL / NET: 450 mL        Appearance: Normal	  Cardiovascular: Normal S1 S2,RRR, No JVD, No murmurs  Respiratory: B/L rhonchi	  Gastrointestinal:  Soft, Non-tender, + BS	  Extremities: Normal range of motion, No clubbing, cyanosis or edema  Vascular: Peripheral pulses palpable 2+ bilaterally     LABS:	 	                          9.4    9.59  )-----------( 277      ( 04 Aug 2020 07:09 )             31.8     08-04    139  |  89<L>  |  38<H>  ----------------------------<  123<H>  4.0   |  45<HH>  |  1.11    Ca    9.5      04 Aug 2020 07:09    TPro  5.8<L>  /  Alb  3.6  /  TBili  0.3  /  DBili  x   /  AST  21  /  ALT  19  /  AlkPhos  54  08-03          CARDIAC MARKERS:        MEDICATIONS  (STANDING):  albuterol/ipratropium for Nebulization 3 milliLiter(s) Nebulizer every 6 hours  apixaban 5 milliGRAM(s) Oral every 12 hours  aspirin enteric coated 81 milliGRAM(s) Oral daily  atorvastatin 80 milliGRAM(s) Oral at bedtime  budesonide 160 MICROgram(s)/formoterol 4.5 MICROgram(s) Inhaler 2 Puff(s) Inhalation two times a day  cholecalciferol 2000 Unit(s) Oral daily  dextrose 5%. 1000 milliLiter(s) (50 mL/Hr) IV Continuous <Continuous>  dextrose 50% Injectable 12.5 Gram(s) IV Push once  dextrose 50% Injectable 25 Gram(s) IV Push once  dextrose 50% Injectable 25 Gram(s) IV Push once  diltiazem    milliGRAM(s) Oral daily  furosemide   Injectable 40 milliGRAM(s) IV Push daily  insulin glargine Injectable (LANTUS) 10 Unit(s) SubCutaneous at bedtime  insulin lispro (HumaLOG) corrective regimen sliding scale   SubCutaneous three times a day before meals  insulin lispro (HumaLOG) corrective regimen sliding scale   SubCutaneous at bedtime  insulin lispro Injectable (HumaLOG) 3 Unit(s) SubCutaneous three times a day before meals  montelukast 10 milliGRAM(s) Oral daily  multivitamin 1 Tablet(s) Oral daily  pantoprazole    Tablet 40 milliGRAM(s) Oral before breakfast  predniSONE   Tablet 40 milliGRAM(s) Oral daily  theophylline ER (24 Hour) 400 milliGRAM(s) Oral daily  tiotropium 18 MICROgram(s) Capsule 1 Capsule(s) Inhalation daily

## 2020-08-04 NOTE — PROGRESS NOTE ADULT - SUBJECTIVE AND OBJECTIVE BOX
Patient is a 60y old  Female who presents with a chief complaint of 60F p/w generalized weakness and difficulty walking (03 Aug 2020 10:48)      SUBJECTIVE / OVERNIGHT EVENTS: patient was on BiPAP overnight, tolerated it well. Breathing is better per patient. Feeling better this morning.     MEDICATIONS  (STANDING):  albuterol/ipratropium for Nebulization 3 milliLiter(s) Nebulizer every 6 hours  apixaban 5 milliGRAM(s) Oral every 12 hours  aspirin enteric coated 81 milliGRAM(s) Oral daily  atorvastatin 80 milliGRAM(s) Oral at bedtime  budesonide 160 MICROgram(s)/formoterol 4.5 MICROgram(s) Inhaler 2 Puff(s) Inhalation two times a day  cholecalciferol 2000 Unit(s) Oral daily  dextrose 5%. 1000 milliLiter(s) (50 mL/Hr) IV Continuous <Continuous>  dextrose 50% Injectable 12.5 Gram(s) IV Push once  dextrose 50% Injectable 25 Gram(s) IV Push once  dextrose 50% Injectable 25 Gram(s) IV Push once  diltiazem    milliGRAM(s) Oral daily  furosemide   Injectable 40 milliGRAM(s) IV Push daily  insulin glargine Injectable (LANTUS) 4 Unit(s) SubCutaneous at bedtime  insulin lispro (HumaLOG) corrective regimen sliding scale   SubCutaneous three times a day before meals  insulin lispro (HumaLOG) corrective regimen sliding scale   SubCutaneous at bedtime  montelukast 10 milliGRAM(s) Oral daily  multivitamin 1 Tablet(s) Oral daily  pantoprazole    Tablet 40 milliGRAM(s) Oral before breakfast  predniSONE   Tablet 40 milliGRAM(s) Oral daily  theophylline ER (24 Hour) 400 milliGRAM(s) Oral daily  tiotropium 18 MICROgram(s) Capsule 1 Capsule(s) Inhalation daily    MEDICATIONS  (PRN):  dextrose 40% Gel 15 Gram(s) Oral once PRN Blood Glucose LESS THAN 70 milliGRAM(s)/deciliter  glucagon  Injectable 1 milliGRAM(s) IntraMuscular once PRN Glucose LESS THAN 70 milligrams/deciliter      Vital Signs Last 24 Hrs  T(C): 36.8 (04 Aug 2020 09:26), Max: 36.8 (04 Aug 2020 09:26)  T(F): 98.2 (04 Aug 2020 09:26), Max: 98.2 (04 Aug 2020 09:26)  HR: 82 (04 Aug 2020 09:26) (82 - 99)  BP: 158/115 (04 Aug 2020 09:26) (129/73 - 158/115)  BP(mean): --  RR: 18 (04 Aug 2020 09:26) (18 - 18)  SpO2: 100% (04 Aug 2020 09:26) (97% - 100%)  CAPILLARY BLOOD GLUCOSE      POCT Blood Glucose.: 303 mg/dL (04 Aug 2020 09:23)  POCT Blood Glucose.: 198 mg/dL (03 Aug 2020 21:22)  POCT Blood Glucose.: 361 mg/dL (03 Aug 2020 17:34)  POCT Blood Glucose.: 348 mg/dL (03 Aug 2020 12:33)    I&O's Summary    03 Aug 2020 07:01  -  04 Aug 2020 07:00  --------------------------------------------------------  IN: 950 mL / OUT: 400 mL / NET: 550 mL        PHYSICAL EXAM:  GENERAL: NAD  EYES: conjunctiva and sclera clear  CHEST/LUNG: Clear to auscultation bilaterally; No wheeze  HEART: +S1/S2, reg   ABDOMEN: Soft, Nontender, Nondistended  EXTREMITIES:  no LE edema   PSYCH: AAOx3    LABS:                        9.4    9.59  )-----------( 277      ( 04 Aug 2020 07:09 )             31.8     08-04    139  |  89<L>  |  38<H>  ----------------------------<  123<H>  4.0   |  45<HH>  |  1.11    Ca    9.5      04 Aug 2020 07:09    TPro  5.8<L>  /  Alb  3.6  /  TBili  0.3  /  DBili  x   /  AST  21  /  ALT  19  /  AlkPhos  54  08-03

## 2020-08-04 NOTE — PROGRESS NOTE ADULT - PROBLEM SELECTOR PLAN 2
- report of generalized weakness as well as leg weakness. Exam with diminished strength in left hip flexor 3-4/5.  - no urinary/bowel incontinence, no saddle anesthesia, no reported fall/trauma, and reports recent normal CT head this week. Patient REFUSES MR imaging for further evaluation at time of medicine admit despite recommendation.   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 Outpatient lab work this month with TSH,b12,folate, iron wnl.  - neuro consulted   - Patient currently being tapered off of steroids (received IV steroid at [facility]) now on prednisone 40d. Given end-stage COPD would favor conservative steroid taper.  - per chart review: TTE 2018 and EVNA 2019 demonstrate normal LV/RV systolic function (home lasix is for HTN)  -PT eval is in progress  -neurology recommending EMG as an outpatient, and outpt f/u

## 2020-08-04 NOTE — PROGRESS NOTE ADULT - SUBJECTIVE AND OBJECTIVE BOX
Interval Events:  Patient clinically feels improved with BiPAP  States dyspnea is improved this morning    REVIEW OF SYSTEMS:  Constitutional:   Eyes:  ENT:  CV:  Resp: sob (improved)  GI:  :  MSK:  Integumentary:  Neurological:  Psychiatric:  Endocrine:  Hematologic/Lymphatic:  Allergic/Immunologic:  [x] All other systems negative  [ ] Unable to assess ROS because ________    OBJECTIVE:  ICU Vital Signs Last 24 Hrs  T(C): 36.8 (04 Aug 2020 09:26), Max: 36.8 (04 Aug 2020 09:26)  T(F): 98.2 (04 Aug 2020 09:26), Max: 98.2 (04 Aug 2020 09:26)  HR: 82 (04 Aug 2020 09:26) (82 - 99)  BP: 158/115 (04 Aug 2020 09:26) (129/73 - 158/115)  BP(mean): --  ABP: --  ABP(mean): --  RR: 18 (04 Aug 2020 09:26) (18 - 18)  SpO2: 100% (04 Aug 2020 09:26) (97% - 100%)        08-03 @ 07:01 - 08-04 @ 07:00  --------------------------------------------------------  IN: 950 mL / OUT: 400 mL / NET: 550 mL    08-04 @ 07:01 - 08-04 @ 12:38  --------------------------------------------------------  IN: 450 mL / OUT: 0 mL / NET: 450 mL      CAPILLARY BLOOD GLUCOSE      POCT Blood Glucose.: 303 mg/dL (04 Aug 2020 09:23)      PHYSICAL EXAM:  General: AAOx3  HEENT: EOMI, PERRL  Lymph Nodes: No LAD  Neck: Supple  Respiratory: decreased bs bilaterally (improved)  Cardiovascular: RRR   Abdomen: soft, NT/ND  Extremities: trace edema    HOSPITAL MEDICATIONS:  MEDICATIONS  (STANDING):  albuterol/ipratropium for Nebulization 3 milliLiter(s) Nebulizer every 6 hours  apixaban 5 milliGRAM(s) Oral every 12 hours  aspirin enteric coated 81 milliGRAM(s) Oral daily  atorvastatin 80 milliGRAM(s) Oral at bedtime  budesonide 160 MICROgram(s)/formoterol 4.5 MICROgram(s) Inhaler 2 Puff(s) Inhalation two times a day  cholecalciferol 2000 Unit(s) Oral daily  dextrose 5%. 1000 milliLiter(s) (50 mL/Hr) IV Continuous <Continuous>  dextrose 50% Injectable 12.5 Gram(s) IV Push once  dextrose 50% Injectable 25 Gram(s) IV Push once  dextrose 50% Injectable 25 Gram(s) IV Push once  diltiazem    milliGRAM(s) Oral daily  furosemide   Injectable 40 milliGRAM(s) IV Push daily  insulin glargine Injectable (LANTUS) 10 Unit(s) SubCutaneous at bedtime  insulin lispro (HumaLOG) corrective regimen sliding scale   SubCutaneous three times a day before meals  insulin lispro (HumaLOG) corrective regimen sliding scale   SubCutaneous at bedtime  insulin lispro Injectable (HumaLOG) 3 Unit(s) SubCutaneous three times a day before meals  montelukast 10 milliGRAM(s) Oral daily  multivitamin 1 Tablet(s) Oral daily  pantoprazole    Tablet 40 milliGRAM(s) Oral before breakfast  predniSONE   Tablet 40 milliGRAM(s) Oral daily  theophylline ER (24 Hour) 400 milliGRAM(s) Oral daily  tiotropium 18 MICROgram(s) Capsule 1 Capsule(s) Inhalation daily    MEDICATIONS  (PRN):  dextrose 40% Gel 15 Gram(s) Oral once PRN Blood Glucose LESS THAN 70 milliGRAM(s)/deciliter  glucagon  Injectable 1 milliGRAM(s) IntraMuscular once PRN Glucose LESS THAN 70 milligrams/deciliter      LABS:                        9.4    9.59  )-----------( 277      ( 04 Aug 2020 07:09 )             31.8     08-04    139  |  89<L>  |  38<H>  ----------------------------<  123<H>  4.0   |  45<HH>  |  1.11    Ca    9.5      04 Aug 2020 07:09    TPro  5.8<L>  /  Alb  3.6  /  TBili  0.3  /  DBili  x   /  AST  21  /  ALT  19  /  AlkPhos  54  08-03        Arterial Blood Gas:  08-03 @ 12:39  7.41/74/78/46/94/18.2  ABG lactate: --        RADIOLOGY:  [x] Reviewed and interpreted by me

## 2020-08-04 NOTE — PROGRESS NOTE ADULT - PROBLEM SELECTOR PLAN 3
- for now c/w prednisone 40mg d, spiriva, symbicort, ventolin, theophyline  - further management as above

## 2020-08-05 LAB
ANION GAP SERPL CALC-SCNC: 12 MMOL/L — SIGNIFICANT CHANGE UP (ref 5–17)
BASOPHILS # BLD AUTO: 0 K/UL — SIGNIFICANT CHANGE UP (ref 0–0.2)
BASOPHILS NFR BLD AUTO: 0 % — SIGNIFICANT CHANGE UP (ref 0–2)
BUN SERPL-MCNC: 32 MG/DL — HIGH (ref 7–23)
CALCIUM SERPL-MCNC: 9.5 MG/DL — SIGNIFICANT CHANGE UP (ref 8.4–10.5)
CHLORIDE SERPL-SCNC: 87 MMOL/L — LOW (ref 96–108)
CO2 SERPL-SCNC: 39 MMOL/L — HIGH (ref 22–31)
CREAT SERPL-MCNC: 0.97 MG/DL — SIGNIFICANT CHANGE UP (ref 0.5–1.3)
EOSINOPHIL # BLD AUTO: 0.05 K/UL — SIGNIFICANT CHANGE UP (ref 0–0.5)
EOSINOPHIL NFR BLD AUTO: 0.6 % — SIGNIFICANT CHANGE UP (ref 0–6)
GLUCOSE BLDC GLUCOMTR-MCNC: 183 MG/DL — HIGH (ref 70–99)
GLUCOSE BLDC GLUCOMTR-MCNC: 241 MG/DL — HIGH (ref 70–99)
GLUCOSE BLDC GLUCOMTR-MCNC: 271 MG/DL — HIGH (ref 70–99)
GLUCOSE BLDC GLUCOMTR-MCNC: 290 MG/DL — HIGH (ref 70–99)
GLUCOSE SERPL-MCNC: 214 MG/DL — HIGH (ref 70–99)
HCT VFR BLD CALC: 31.1 % — LOW (ref 34.5–45)
HGB BLD-MCNC: 9.3 G/DL — LOW (ref 11.5–15.5)
IMM GRANULOCYTES NFR BLD AUTO: 1.2 % — SIGNIFICANT CHANGE UP (ref 0–1.5)
LYMPHOCYTES # BLD AUTO: 0.9 K/UL — LOW (ref 1–3.3)
LYMPHOCYTES # BLD AUTO: 10 % — LOW (ref 13–44)
MCHC RBC-ENTMCNC: 26 PG — LOW (ref 27–34)
MCHC RBC-ENTMCNC: 29.9 GM/DL — LOW (ref 32–36)
MCV RBC AUTO: 86.9 FL — SIGNIFICANT CHANGE UP (ref 80–100)
MONOCYTES # BLD AUTO: 0.64 K/UL — SIGNIFICANT CHANGE UP (ref 0–0.9)
MONOCYTES NFR BLD AUTO: 7.1 % — SIGNIFICANT CHANGE UP (ref 2–14)
NEUTROPHILS # BLD AUTO: 7.34 K/UL — SIGNIFICANT CHANGE UP (ref 1.8–7.4)
NEUTROPHILS NFR BLD AUTO: 81.1 % — HIGH (ref 43–77)
NRBC # BLD: 0 /100 WBCS — SIGNIFICANT CHANGE UP (ref 0–0)
PLATELET # BLD AUTO: 278 K/UL — SIGNIFICANT CHANGE UP (ref 150–400)
POTASSIUM SERPL-MCNC: 4.8 MMOL/L — SIGNIFICANT CHANGE UP (ref 3.5–5.3)
POTASSIUM SERPL-SCNC: 4.8 MMOL/L — SIGNIFICANT CHANGE UP (ref 3.5–5.3)
RAPID RVP RESULT: SIGNIFICANT CHANGE UP
RBC # BLD: 3.58 M/UL — LOW (ref 3.8–5.2)
RBC # FLD: 14.9 % — HIGH (ref 10.3–14.5)
SODIUM SERPL-SCNC: 138 MMOL/L — SIGNIFICANT CHANGE UP (ref 135–145)
SSDNA AB SER-ACNC: <20 EU — SIGNIFICANT CHANGE UP (ref 0–19)
WBC # BLD: 9.04 K/UL — SIGNIFICANT CHANGE UP (ref 3.8–10.5)
WBC # FLD AUTO: 9.04 K/UL — SIGNIFICANT CHANGE UP (ref 3.8–10.5)

## 2020-08-05 PROCEDURE — 93306 TTE W/DOPPLER COMPLETE: CPT | Mod: 26

## 2020-08-05 PROCEDURE — 99233 SBSQ HOSP IP/OBS HIGH 50: CPT

## 2020-08-05 RX ORDER — ACETAZOLAMIDE 250 MG/1
250 TABLET ORAL ONCE
Refills: 0 | Status: COMPLETED | OUTPATIENT
Start: 2020-08-05 | End: 2020-08-05

## 2020-08-05 RX ORDER — ALPRAZOLAM 0.25 MG
0.25 TABLET ORAL EVERY 8 HOURS
Refills: 0 | Status: DISCONTINUED | OUTPATIENT
Start: 2020-08-05 | End: 2020-08-06

## 2020-08-05 RX ORDER — ACETAZOLAMIDE 250 MG/1
250 TABLET ORAL ONCE
Refills: 0 | Status: DISCONTINUED | OUTPATIENT
Start: 2020-08-05 | End: 2020-08-05

## 2020-08-05 RX ADMIN — Medication 3 MILLILITER(S): at 06:05

## 2020-08-05 RX ADMIN — BUDESONIDE AND FORMOTEROL FUMARATE DIHYDRATE 2 PUFF(S): 160; 4.5 AEROSOL RESPIRATORY (INHALATION) at 06:05

## 2020-08-05 RX ADMIN — Medication 40 MILLIGRAM(S): at 06:05

## 2020-08-05 RX ADMIN — TIOTROPIUM BROMIDE 1 CAPSULE(S): 18 CAPSULE ORAL; RESPIRATORY (INHALATION) at 12:38

## 2020-08-05 RX ADMIN — Medication 3 MILLILITER(S): at 23:03

## 2020-08-05 RX ADMIN — MONTELUKAST 10 MILLIGRAM(S): 4 TABLET, CHEWABLE ORAL at 12:45

## 2020-08-05 RX ADMIN — Medication 2: at 08:46

## 2020-08-05 RX ADMIN — Medication 3: at 12:40

## 2020-08-05 RX ADMIN — Medication 3 UNIT(S): at 08:47

## 2020-08-05 RX ADMIN — Medication 180 MILLIGRAM(S): at 06:05

## 2020-08-05 RX ADMIN — PANTOPRAZOLE SODIUM 40 MILLIGRAM(S): 20 TABLET, DELAYED RELEASE ORAL at 06:05

## 2020-08-05 RX ADMIN — APIXABAN 5 MILLIGRAM(S): 2.5 TABLET, FILM COATED ORAL at 17:36

## 2020-08-05 RX ADMIN — Medication 3 MILLILITER(S): at 17:35

## 2020-08-05 RX ADMIN — Medication 1 TABLET(S): at 12:39

## 2020-08-05 RX ADMIN — ACETAZOLAMIDE 250 MILLIGRAM(S): 250 TABLET ORAL at 15:28

## 2020-08-05 RX ADMIN — Medication 3 MILLILITER(S): at 00:50

## 2020-08-05 RX ADMIN — Medication 400 MILLIGRAM(S): at 12:45

## 2020-08-05 RX ADMIN — Medication 3: at 17:36

## 2020-08-05 RX ADMIN — ATORVASTATIN CALCIUM 80 MILLIGRAM(S): 80 TABLET, FILM COATED ORAL at 21:22

## 2020-08-05 RX ADMIN — Medication 3 UNIT(S): at 17:36

## 2020-08-05 RX ADMIN — APIXABAN 5 MILLIGRAM(S): 2.5 TABLET, FILM COATED ORAL at 06:05

## 2020-08-05 RX ADMIN — BUDESONIDE AND FORMOTEROL FUMARATE DIHYDRATE 2 PUFF(S): 160; 4.5 AEROSOL RESPIRATORY (INHALATION) at 17:36

## 2020-08-05 RX ADMIN — Medication 3 UNIT(S): at 12:39

## 2020-08-05 RX ADMIN — Medication 3 MILLILITER(S): at 12:40

## 2020-08-05 RX ADMIN — Medication 2000 UNIT(S): at 12:39

## 2020-08-05 RX ADMIN — INSULIN GLARGINE 10 UNIT(S): 100 INJECTION, SOLUTION SUBCUTANEOUS at 21:22

## 2020-08-05 RX ADMIN — Medication 81 MILLIGRAM(S): at 12:39

## 2020-08-05 NOTE — PROGRESS NOTE ADULT - SUBJECTIVE AND OBJECTIVE BOX
CARDIOLOGY FOLLOW UP - Dr. Brunson    CC no cp   on bipap for c/o dyspnea   co LE edema      PHYSICAL EXAM:  T(C): 36.7 (08-05-20 @ 07:36), Max: 36.7 (08-05-20 @ 07:36)  HR: 102 (08-05-20 @ 09:00) (69 - 102)  BP: 130/62 (08-05-20 @ 09:00) (130/62 - 143/70)  RR: 20 (08-05-20 @ 09:00) (18 - 20)  SpO2: 100% (08-05-20 @ 09:00) (96% - 100%)  Wt(kg): --  I&O's Summary    04 Aug 2020 07:01  -  05 Aug 2020 07:00  --------------------------------------------------------  IN: 870 mL / OUT: 2 mL / NET: 868 mL    05 Aug 2020 07:01  -  05 Aug 2020 12:41  --------------------------------------------------------  IN: 200 mL / OUT: 0 mL / NET: 200 mL        Appearance: Normal	  Cardiovascular: Normal S1 S2,RRR, No JVD, No murmurs  Respiratory: diminished    Gastrointestinal:  Soft, Non-tender, + BS	  Extremities:  bl le edmea++         MEDICATIONS  (STANDING):  acetaZOLAMIDE Injectable 250 milliGRAM(s) IV Push once  albuterol/ipratropium for Nebulization 3 milliLiter(s) Nebulizer every 6 hours  apixaban 5 milliGRAM(s) Oral every 12 hours  aspirin enteric coated 81 milliGRAM(s) Oral daily  atorvastatin 80 milliGRAM(s) Oral at bedtime  budesonide 160 MICROgram(s)/formoterol 4.5 MICROgram(s) Inhaler 2 Puff(s) Inhalation two times a day  cholecalciferol 2000 Unit(s) Oral daily  dextrose 5%. 1000 milliLiter(s) (50 mL/Hr) IV Continuous <Continuous>  dextrose 50% Injectable 12.5 Gram(s) IV Push once  dextrose 50% Injectable 25 Gram(s) IV Push once  dextrose 50% Injectable 25 Gram(s) IV Push once  diltiazem    milliGRAM(s) Oral daily  insulin glargine Injectable (LANTUS) 10 Unit(s) SubCutaneous at bedtime  insulin lispro (HumaLOG) corrective regimen sliding scale   SubCutaneous three times a day before meals  insulin lispro (HumaLOG) corrective regimen sliding scale   SubCutaneous at bedtime  insulin lispro Injectable (HumaLOG) 3 Unit(s) SubCutaneous three times a day before meals  montelukast 10 milliGRAM(s) Oral daily  multivitamin 1 Tablet(s) Oral daily  pantoprazole    Tablet 40 milliGRAM(s) Oral before breakfast  predniSONE   Tablet 40 milliGRAM(s) Oral daily  theophylline ER (24 Hour) 400 milliGRAM(s) Oral daily  tiotropium 18 MICROgram(s) Capsule 1 Capsule(s) Inhalation daily      TELEMETRY: 	    ECG:  	  RADIOLOGY:   DIAGNOSTIC TESTING:  [ ] Echocardiogram:  [ ]  Catheterization:  [ ] Stress Test:    OTHER: 	    LABS:	 	                            9.3    9.04  )-----------( 278      ( 05 Aug 2020 09:20 )             31.1     08-05    138  |  87<L>  |  32<H>  ----------------------------<  214<H>  4.8   |  39<H>  |  0.97    Ca    9.5      05 Aug 2020 09:20              1.

## 2020-08-05 NOTE — PROGRESS NOTE ADULT - PROBLEM SELECTOR PLAN 2
- report of generalized weakness as well as leg weakness. Exam with diminished strength in left hip flexor 3-4/5.  - no urinary/bowel incontinence, no saddle anesthesia, no reported fall/trauma, and reports recent normal CT head this week. Patient REFUSES MR imaging for further evaluation at time of medicine admit despite recommendation.   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 Outpatient lab work this month with TSH,b12,folate, iron wnl.  - Patient currently being tapered off of steroids (received IV steroid at [facility]) now on prednisone 40d. Given end-stage COPD would favor conservative steroid taper.  - per chart review: TTE 2018 and EVAN 2019 demonstrate normal LV/RV systolic function (home lasix is for HTN)  -PT eval is in progress, pt declined when they came back to re-eval yesterday, will reattempt today   -neurology recommending EMG as an outpatient, and outpt f/u

## 2020-08-05 NOTE — PROGRESS NOTE ADULT - ASSESSMENT
EVAN 1/7/19: no ALINA thrombus, unable to assess LV sys fx  echo 12/7/18: EF 71%, nl LV sys fx, mild diastolic dysfx     a/p    60 year old female with hx of D CHF, HTN, CAD s/p PCI, Afib/ aflutter, s/p Aflutter Ablation 12/2019, COPD, DM2, Anxiety, , raynaud phenomenon presenting with weakness, SOB , hyperkalemia     1. SOB  -r/t COPD   -pulm f/u   -chest xray unremarkable  -covid 19 negative  -po steroids, neb , bipap   -no acs , cv stable   -ecg with known RBBB, new twi noted, hyperkalemia also noted on admission  -Pending echo to eval LV fx     2. CAD s/p PCI   -no cp, no sob  -c/w ASA, statin  -check echo     3. Afib/aflutter s/p  Aflutter Ablation 12/2019  -in NSR, cw cardizem  mg daily  -CHADsVac=2, c/w eliquis     4. Acute on Chronic Diastolic CHF   -+dyspnea secondary to copd;  placed on bipap   -check echo to eval LV fx  -lasix dc 8/4 for contraction metabolic alkalosis  -++le edema, plan for diamox IVP     dvt ppx

## 2020-08-05 NOTE — PROGRESS NOTE ADULT - PROBLEM SELECTOR PLAN 9
- holding lasix for elevated CO2, may need to start acetazolamide in place   - c/w home diltiazem dosing  - outpatient chart documents patient is supposed to be on metolazone MWF but patient denies taking medication

## 2020-08-05 NOTE — PROGRESS NOTE ADULT - ASSESSMENT
60F w/ end stage COPD on 3LNC (pending transplant list at [facility]), former smoker, CAD s/p stent (2016), afib on eliquis, uncontrolled DM2, raynaud phenomenon and recent hospitalization at [facility] for altered mental status and [facility] presents to [facility] for evaluation of generalized weakness and difficulty walking admit to medicine for further evaluation.

## 2020-08-05 NOTE — PROGRESS NOTE ADULT - ASSESSMENT
59 y/o F with PMH of COPD on home oxygen 4L NC (Pulmonary: Dr. Anita Mathew), AF on Eliquis, CAD s/p PCI recently hospitalized at [facility] for AMS and AURORA.  At discharge, she was sent home on prednisone and returns with complaints of LE edema.   She was noted to have increased WOB and pulmonary was consulted for further management.    1.  Acute on chronic hypoxemic/hypercapnic respiratory failure-  -Etiology: acute exacerbation of COPD  -Clinically, still dyspneic but feels better on BiPAP  -She is on 50& FiO2 on BiPAP  -Cont Duonebs q6 standing and prednisone 40mg QD for now  -Cont Symbicort and Sprirva  -Cont BiPAP at 12/5 at 50% with goal oxygen saturation >92%  -Would check RVP to complete infectious work up  -She has evidence of LE edema with a rising serum bicarbonate, please give a dose of acetazolamide    Parminder Carranza, DO 59 y/o F with PMH of COPD on home oxygen 4L NC (Pulmonary: Dr. Anita Mathew), AF on Eliquis, CAD s/p PCI recently hospitalized at [facility] for AMS and AUR[facility]A.  At discharge, she was sent home on prednisone and returns with complaints of LE edema.   She was noted to have increased WOB and pulmonary was consulted for further management.    1.  Acute on chronic hypoxemic/hypercapnic respiratory failure-  -Etiology: acute exacerbation of COPD  -Clinically, still dyspneic but feels better on BiPAP  -She is on 50& FiO2 on BiPAP  -Cont Duonebs q6 standing and prednisone 40mg QD for now  -Cont Symbicort and Sprirva  -Cont BiPAP at 12/5 at 50% with goal oxygen saturation >92%  -Would check RVP to complete infectious work up  -She has evidence of LE edema with a rising serum bicarbonate, please give a dose of acetazolamide  -Can consider giving an anti-anxiolytic as anxiety is also a component driving some of her sypmtoms    Parminder Carranza, DO

## 2020-08-05 NOTE — PROGRESS NOTE ADULT - PROBLEM SELECTOR PLAN 1
-from COPD exacerbation  -c/w BIPAP at night   -continue with duonebs, symbicort and spiriva   -f/u pulm recs   -continuing steroids for now  -also suspect a component of anxiety -from COPD exacerbation  -c/w BIPAP at night   -continue with duonebs, symbicort and spiriva   -f/u pulm recs   -continuing steroids for now  -also suspect a component of anxiety  -discussed with pulm attending, will let us know when to taper steroids

## 2020-08-05 NOTE — PROGRESS NOTE ADULT - SUBJECTIVE AND OBJECTIVE BOX
Interval Events:  Patient wore BiPAP overnight  Still dyspneic but feels better on NIPPV  No acute events overnight    REVIEW OF SYSTEMS:  Constitutional:   Eyes:  ENT:  CV:  Resp: sob  GI:  :  MSK:  Integumentary:  Neurological:  Psychiatric:  Endocrine:  Hematologic/Lymphatic:  Allergic/Immunologic:  [x] All other systems negative  [ ] Unable to assess ROS because ________    OBJECTIVE:  ICU Vital Signs Last 24 Hrs  T(C): 36.7 (05 Aug 2020 07:36), Max: 36.7 (05 Aug 2020 07:36)  T(F): 98 (05 Aug 2020 07:36), Max: 98 (05 Aug 2020 07:36)  HR: 102 (05 Aug 2020 09:00) (69 - 102)  BP: 130/62 (05 Aug 2020 09:00) (130/62 - 143/70)  BP(mean): --  ABP: --  ABP(mean): --  RR: 20 (05 Aug 2020 09:00) (18 - 20)  SpO2: 100% (05 Aug 2020 09:00) (96% - 100%)        08-04 @ 07:01  -  08-05 @ 07:00  --------------------------------------------------------  IN: 870 mL / OUT: 2 mL / NET: 868 mL    08-05 @ 07:01 - 08-05 @ 11:49  --------------------------------------------------------  IN: 200 mL / OUT: 0 mL / NET: 200 mL      CAPILLARY BLOOD GLUCOSE      POCT Blood Glucose.: 241 mg/dL (05 Aug 2020 08:40)      PHYSICAL EXAM:  General: AAOx3  HEENT: EOMI, PERRL  Lymph Nodes: No LAD  Neck: Supple  Respiratory: decreased BS bilaterally  Cardiovascular: RRR   Abdomen: soft, NT/ND  Extremities: 1+ edema    HOSPITAL MEDICATIONS:  MEDICATIONS  (STANDING):  acetaZOLAMIDE Injectable 250 milliGRAM(s) IV Push once  albuterol/ipratropium for Nebulization 3 milliLiter(s) Nebulizer every 6 hours  apixaban 5 milliGRAM(s) Oral every 12 hours  aspirin enteric coated 81 milliGRAM(s) Oral daily  atorvastatin 80 milliGRAM(s) Oral at bedtime  budesonide 160 MICROgram(s)/formoterol 4.5 MICROgram(s) Inhaler 2 Puff(s) Inhalation two times a day  cholecalciferol 2000 Unit(s) Oral daily  dextrose 5%. 1000 milliLiter(s) (50 mL/Hr) IV Continuous <Continuous>  dextrose 50% Injectable 12.5 Gram(s) IV Push once  dextrose 50% Injectable 25 Gram(s) IV Push once  dextrose 50% Injectable 25 Gram(s) IV Push once  diltiazem    milliGRAM(s) Oral daily  insulin glargine Injectable (LANTUS) 10 Unit(s) SubCutaneous at bedtime  insulin lispro (HumaLOG) corrective regimen sliding scale   SubCutaneous three times a day before meals  insulin lispro (HumaLOG) corrective regimen sliding scale   SubCutaneous at bedtime  insulin lispro Injectable (HumaLOG) 3 Unit(s) SubCutaneous three times a day before meals  montelukast 10 milliGRAM(s) Oral daily  multivitamin 1 Tablet(s) Oral daily  pantoprazole    Tablet 40 milliGRAM(s) Oral before breakfast  predniSONE   Tablet 40 milliGRAM(s) Oral daily  theophylline ER (24 Hour) 400 milliGRAM(s) Oral daily  tiotropium 18 MICROgram(s) Capsule 1 Capsule(s) Inhalation daily    MEDICATIONS  (PRN):  dextrose 40% Gel 15 Gram(s) Oral once PRN Blood Glucose LESS THAN 70 milliGRAM(s)/deciliter  glucagon  Injectable 1 milliGRAM(s) IntraMuscular once PRN Glucose LESS THAN 70 milligrams/deciliter      LABS:                        9.3    9.04  )-----------( 278      ( 05 Aug 2020 09:20 )             31.1     08-05    138  |  87<L>  |  32<H>  ----------------------------<  214<H>  4.8   |  39<H>  |  0.97    Ca    9.5      05 Aug 2020 09:20          Arterial Blood Gas:  08-03 @ 12:39  7.41/74/78/46/94/18.2  ABG lactate: --        RADIOLOGY:  [x] Reviewed and interpreted by me

## 2020-08-05 NOTE — PROGRESS NOTE ADULT - PROBLEM SELECTOR PLAN 4
- does not appear to have infection or sepsis at time of medicine admit  - likely related to steroid use  - unclear if relation to weakness  - monitor off antibiotics  - resolved

## 2020-08-05 NOTE — PROGRESS NOTE ADULT - ATTENDING COMMENTS
Agree with above NP note.  lasic held for contraction alkalosis  Diamox started  plumo f/u  steroids, nebz  check echo

## 2020-08-06 LAB
ANION GAP SERPL CALC-SCNC: 7 MMOL/L — SIGNIFICANT CHANGE UP (ref 5–17)
BUN SERPL-MCNC: 33 MG/DL — HIGH (ref 7–23)
CALCIUM SERPL-MCNC: 9.6 MG/DL — SIGNIFICANT CHANGE UP (ref 8.4–10.5)
CHLORIDE SERPL-SCNC: 89 MMOL/L — LOW (ref 96–108)
CO2 SERPL-SCNC: 40 MMOL/L — HIGH (ref 22–31)
CREAT SERPL-MCNC: 1.19 MG/DL — SIGNIFICANT CHANGE UP (ref 0.5–1.3)
GLUCOSE BLDC GLUCOMTR-MCNC: 189 MG/DL — HIGH (ref 70–99)
GLUCOSE BLDC GLUCOMTR-MCNC: 193 MG/DL — HIGH (ref 70–99)
GLUCOSE BLDC GLUCOMTR-MCNC: 269 MG/DL — HIGH (ref 70–99)
GLUCOSE BLDC GLUCOMTR-MCNC: 274 MG/DL — HIGH (ref 70–99)
GLUCOSE BLDC GLUCOMTR-MCNC: 309 MG/DL — HIGH (ref 70–99)
GLUCOSE SERPL-MCNC: 173 MG/DL — HIGH (ref 70–99)
HCT VFR BLD CALC: 28.9 % — LOW (ref 34.5–45)
HGB BLD-MCNC: 8.8 G/DL — LOW (ref 11.5–15.5)
MCHC RBC-ENTMCNC: 26.7 PG — LOW (ref 27–34)
MCHC RBC-ENTMCNC: 30.4 GM/DL — LOW (ref 32–36)
MCV RBC AUTO: 87.6 FL — SIGNIFICANT CHANGE UP (ref 80–100)
NRBC # BLD: 0 /100 WBCS — SIGNIFICANT CHANGE UP (ref 0–0)
PLATELET # BLD AUTO: 269 K/UL — SIGNIFICANT CHANGE UP (ref 150–400)
POTASSIUM SERPL-MCNC: 4.2 MMOL/L — SIGNIFICANT CHANGE UP (ref 3.5–5.3)
POTASSIUM SERPL-SCNC: 4.2 MMOL/L — SIGNIFICANT CHANGE UP (ref 3.5–5.3)
RBC # BLD: 3.3 M/UL — LOW (ref 3.8–5.2)
RBC # FLD: 15 % — HIGH (ref 10.3–14.5)
SODIUM SERPL-SCNC: 136 MMOL/L — SIGNIFICANT CHANGE UP (ref 135–145)
WBC # BLD: 10.11 K/UL — SIGNIFICANT CHANGE UP (ref 3.8–10.5)
WBC # FLD AUTO: 10.11 K/UL — SIGNIFICANT CHANGE UP (ref 3.8–10.5)

## 2020-08-06 PROCEDURE — 99233 SBSQ HOSP IP/OBS HIGH 50: CPT

## 2020-08-06 RX ORDER — GUAIFENESIN/DEXTROMETHORPHAN 600MG-30MG
10 TABLET, EXTENDED RELEASE 12 HR ORAL ONCE
Refills: 0 | Status: COMPLETED | OUTPATIENT
Start: 2020-08-06 | End: 2020-08-06

## 2020-08-06 RX ORDER — POLYETHYLENE GLYCOL 3350 17 G/17G
17 POWDER, FOR SOLUTION ORAL DAILY
Refills: 0 | Status: DISCONTINUED | OUTPATIENT
Start: 2020-08-06 | End: 2020-08-09

## 2020-08-06 RX ORDER — ALPRAZOLAM 0.25 MG
0.25 TABLET ORAL
Refills: 0 | Status: DISCONTINUED | OUTPATIENT
Start: 2020-08-06 | End: 2020-08-12

## 2020-08-06 RX ORDER — ACETAZOLAMIDE 250 MG/1
250 TABLET ORAL DAILY
Refills: 0 | Status: DISCONTINUED | OUTPATIENT
Start: 2020-08-06 | End: 2020-08-06

## 2020-08-06 RX ORDER — ACETAZOLAMIDE 250 MG/1
250 TABLET ORAL
Refills: 0 | Status: DISCONTINUED | OUTPATIENT
Start: 2020-08-06 | End: 2020-08-18

## 2020-08-06 RX ADMIN — Medication 180 MILLIGRAM(S): at 05:24

## 2020-08-06 RX ADMIN — PANTOPRAZOLE SODIUM 40 MILLIGRAM(S): 20 TABLET, DELAYED RELEASE ORAL at 05:24

## 2020-08-06 RX ADMIN — Medication 40 MILLIGRAM(S): at 05:24

## 2020-08-06 RX ADMIN — MONTELUKAST 10 MILLIGRAM(S): 4 TABLET, CHEWABLE ORAL at 12:13

## 2020-08-06 RX ADMIN — BUDESONIDE AND FORMOTEROL FUMARATE DIHYDRATE 2 PUFF(S): 160; 4.5 AEROSOL RESPIRATORY (INHALATION) at 05:24

## 2020-08-06 RX ADMIN — APIXABAN 5 MILLIGRAM(S): 2.5 TABLET, FILM COATED ORAL at 17:06

## 2020-08-06 RX ADMIN — POLYETHYLENE GLYCOL 3350 17 GRAM(S): 17 POWDER, FOR SOLUTION ORAL at 06:18

## 2020-08-06 RX ADMIN — TIOTROPIUM BROMIDE 1 CAPSULE(S): 18 CAPSULE ORAL; RESPIRATORY (INHALATION) at 12:23

## 2020-08-06 RX ADMIN — Medication 3 UNIT(S): at 08:46

## 2020-08-06 RX ADMIN — Medication 3 MILLILITER(S): at 05:23

## 2020-08-06 RX ADMIN — Medication 1: at 08:45

## 2020-08-06 RX ADMIN — ATORVASTATIN CALCIUM 80 MILLIGRAM(S): 80 TABLET, FILM COATED ORAL at 22:09

## 2020-08-06 RX ADMIN — Medication 3 MILLILITER(S): at 23:36

## 2020-08-06 RX ADMIN — Medication 10 MILLILITER(S): at 23:36

## 2020-08-06 RX ADMIN — ACETAZOLAMIDE 250 MILLIGRAM(S): 250 TABLET ORAL at 13:54

## 2020-08-06 RX ADMIN — Medication 1 TABLET(S): at 12:15

## 2020-08-06 RX ADMIN — Medication 3 UNIT(S): at 12:20

## 2020-08-06 RX ADMIN — Medication 3 MILLILITER(S): at 17:06

## 2020-08-06 RX ADMIN — APIXABAN 5 MILLIGRAM(S): 2.5 TABLET, FILM COATED ORAL at 05:24

## 2020-08-06 RX ADMIN — INSULIN GLARGINE 10 UNIT(S): 100 INJECTION, SOLUTION SUBCUTANEOUS at 22:09

## 2020-08-06 RX ADMIN — Medication 81 MILLIGRAM(S): at 12:14

## 2020-08-06 RX ADMIN — Medication 2000 UNIT(S): at 12:14

## 2020-08-06 RX ADMIN — Medication 3: at 17:04

## 2020-08-06 RX ADMIN — BUDESONIDE AND FORMOTEROL FUMARATE DIHYDRATE 2 PUFF(S): 160; 4.5 AEROSOL RESPIRATORY (INHALATION) at 17:06

## 2020-08-06 RX ADMIN — Medication 3 UNIT(S): at 17:04

## 2020-08-06 RX ADMIN — Medication 3 MILLILITER(S): at 12:18

## 2020-08-06 RX ADMIN — Medication 4: at 12:20

## 2020-08-06 RX ADMIN — Medication 400 MILLIGRAM(S): at 14:02

## 2020-08-06 NOTE — PROGRESS NOTE ADULT - SUBJECTIVE AND OBJECTIVE BOX
CARDIOLOGY FOLLOW UP - Dr. Brunson    CC no cp  +kerr, +sob       PHYSICAL EXAM:  T(C): 36.4 (08-06-20 @ 08:10), Max: 37 (08-05-20 @ 17:51)  HR: 71 (08-06-20 @ 08:10) (71 - 93)  BP: 169/84 (08-06-20 @ 08:10) (118/73 - 169/84)  RR: 19 (08-06-20 @ 08:10) (19 - 20)  SpO2: 100% (08-06-20 @ 08:10) (95% - 100%)  Wt(kg): --  I&O's Summary    05 Aug 2020 07:01  -  06 Aug 2020 07:00  --------------------------------------------------------  IN: 680 mL / OUT: 400 mL / NET: 280 mL        Appearance: Normal	  Cardiovascular: Normal S1 S2,RRR, No JVD, No murmurs  Respiratory: diminished   Gastrointestinal:  Soft, Non-tender, + BS	  Extremities: Normal range of motion, No clubbing, +++b/l le edema         MEDICATIONS  (STANDING):  acetaZOLAMIDE Injectable 250 milliGRAM(s) IV Push <User Schedule>  albuterol/ipratropium for Nebulization 3 milliLiter(s) Nebulizer every 6 hours  apixaban 5 milliGRAM(s) Oral every 12 hours  aspirin enteric coated 81 milliGRAM(s) Oral daily  atorvastatin 80 milliGRAM(s) Oral at bedtime  budesonide 160 MICROgram(s)/formoterol 4.5 MICROgram(s) Inhaler 2 Puff(s) Inhalation two times a day  cholecalciferol 2000 Unit(s) Oral daily  dextrose 5%. 1000 milliLiter(s) (50 mL/Hr) IV Continuous <Continuous>  dextrose 50% Injectable 12.5 Gram(s) IV Push once  dextrose 50% Injectable 25 Gram(s) IV Push once  dextrose 50% Injectable 25 Gram(s) IV Push once  diltiazem    milliGRAM(s) Oral daily  insulin glargine Injectable (LANTUS) 10 Unit(s) SubCutaneous at bedtime  insulin lispro (HumaLOG) corrective regimen sliding scale   SubCutaneous three times a day before meals  insulin lispro (HumaLOG) corrective regimen sliding scale   SubCutaneous at bedtime  insulin lispro Injectable (HumaLOG) 3 Unit(s) SubCutaneous three times a day before meals  montelukast 10 milliGRAM(s) Oral daily  multivitamin 1 Tablet(s) Oral daily  pantoprazole    Tablet 40 milliGRAM(s) Oral before breakfast  predniSONE   Tablet 40 milliGRAM(s) Oral daily  theophylline ER (24 Hour) 400 milliGRAM(s) Oral daily  tiotropium 18 MICROgram(s) Capsule 1 Capsule(s) Inhalation daily      TELEMETRY: 	    ECG:  	  RADIOLOGY:   DIAGNOSTIC TESTING:  [ ] Echocardiogram:  [ ]  Catheterization:  [ ] Stress Test:    OTHER: 	    LABS:	 	                                8.8    10.11 )-----------( 269      ( 06 Aug 2020 07:06 )             28.9     08-06    136  |  89<L>  |  33<H>  ----------------------------<  173<H>  4.2   |  40<H>  |  1.19    Ca    9.6      06 Aug 2020 07:06

## 2020-08-06 NOTE — PROGRESS NOTE ADULT - SUBJECTIVE AND OBJECTIVE BOX
Patient is a 60y old  Female who presents with a chief complaint of 60F p/w generalized weakness and difficulty walking (05 Aug 2020 12:41)    SUBJECTIVE / OVERNIGHT EVENTS: patient had a better night, however dyspneic at times. Concerned about swelling in arms and legs.     MEDICATIONS  (STANDING):  albuterol/ipratropium for Nebulization 3 milliLiter(s) Nebulizer every 6 hours  apixaban 5 milliGRAM(s) Oral every 12 hours  aspirin enteric coated 81 milliGRAM(s) Oral daily  atorvastatin 80 milliGRAM(s) Oral at bedtime  budesonide 160 MICROgram(s)/formoterol 4.5 MICROgram(s) Inhaler 2 Puff(s) Inhalation two times a day  cholecalciferol 2000 Unit(s) Oral daily  dextrose 5%. 1000 milliLiter(s) (50 mL/Hr) IV Continuous <Continuous>  dextrose 50% Injectable 12.5 Gram(s) IV Push once  dextrose 50% Injectable 25 Gram(s) IV Push once  dextrose 50% Injectable 25 Gram(s) IV Push once  diltiazem    milliGRAM(s) Oral daily  insulin glargine Injectable (LANTUS) 10 Unit(s) SubCutaneous at bedtime  insulin lispro (HumaLOG) corrective regimen sliding scale   SubCutaneous three times a day before meals  insulin lispro (HumaLOG) corrective regimen sliding scale   SubCutaneous at bedtime  insulin lispro Injectable (HumaLOG) 3 Unit(s) SubCutaneous three times a day before meals  montelukast 10 milliGRAM(s) Oral daily  multivitamin 1 Tablet(s) Oral daily  pantoprazole    Tablet 40 milliGRAM(s) Oral before breakfast  predniSONE   Tablet 40 milliGRAM(s) Oral daily  theophylline ER (24 Hour) 400 milliGRAM(s) Oral daily  tiotropium 18 MICROgram(s) Capsule 1 Capsule(s) Inhalation daily    MEDICATIONS  (PRN):  ALPRAZolam 0.25 milliGRAM(s) Oral every 8 hours PRN anxiety  dextrose 40% Gel 15 Gram(s) Oral once PRN Blood Glucose LESS THAN 70 milliGRAM(s)/deciliter  glucagon  Injectable 1 milliGRAM(s) IntraMuscular once PRN Glucose LESS THAN 70 milligrams/deciliter  polyethylene glycol 3350 17 Gram(s) Oral daily PRN Constipation      Vital Signs Last 24 Hrs  T(C): 36.4 (06 Aug 2020 08:10), Max: 37 (05 Aug 2020 17:51)  T(F): 97.5 (06 Aug 2020 08:10), Max: 98.6 (05 Aug 2020 17:51)  HR: 71 (06 Aug 2020 08:10) (71 - 93)  BP: 169/84 (06 Aug 2020 08:10) (118/73 - 169/84)  BP(mean): --  RR: 19 (06 Aug 2020 08:10) (19 - 20)  SpO2: 100% (06 Aug 2020 08:10) (95% - 100%)  CAPILLARY BLOOD GLUCOSE      POCT Blood Glucose.: 193 mg/dL (06 Aug 2020 08:23)  POCT Blood Glucose.: 183 mg/dL (05 Aug 2020 21:06)  POCT Blood Glucose.: 290 mg/dL (05 Aug 2020 17:28)  POCT Blood Glucose.: 271 mg/dL (05 Aug 2020 12:29)    I&O's Summary    05 Aug 2020 07:01  -  06 Aug 2020 07:00  --------------------------------------------------------  IN: 680 mL / OUT: 400 mL / NET: 280 mL        PHYSICAL EXAM:  GENERAL: NAD  EYES:  conjunctiva and sclera clear  NECK: Supple, No JVD  CHEST/LUNG: Clear to auscultation bilaterally; No wheeze  HEART: +S1/S2, REG   ABDOMEN: Soft, Nontender, Nondistended  EXTREMITIES:  trace peripheral edema   PSYCH: AAOx3      LABS:                        8.8    10.11 )-----------( 269      ( 06 Aug 2020 07:06 )             28.9     08-06    136  |  89<L>  |  33<H>  ----------------------------<  173<H>  4.2   |  40<H>  |  1.19    Ca    9.6      06 Aug 2020 07:06

## 2020-08-06 NOTE — PROGRESS NOTE ADULT - PROBLEM SELECTOR PLAN 2
- report of generalized weakness as well as leg weakness. Exam with diminished strength in left hip flexor 3-4/5.  - no urinary/bowel incontinence, no saddle anesthesia, no reported fall/trauma, and reports recent normal CT head this week. Patient REFUSES MR imaging for further evaluation at time of medicine admit despite recommendation.   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 Outpatient lab work this month with TSH,b12,folate, iron wnl.  - Patient currently being tapered off of steroids (received IV steroid at [facility]) now on prednisone 40d. Given end-stage COPD would favor conservative steroid taper.  - per chart review: TTE 2018 and EVAN 2019 demonstrate normal LV/RV systolic function (home lasix is for HTN)  -PT eval is in progress  -neurology recommending EMG as an outpatient, and outpt f/u

## 2020-08-06 NOTE — PROGRESS NOTE ADULT - PROBLEM SELECTOR PLAN 1
-from COPD exacerbation  -c/w BIPAP at night   -continue with duonebs, symbicort and spiriva   -continuing steroids for now, will taper once breathing is improved as per pulm  -encouraged to use xanax PRN anxiety

## 2020-08-06 NOTE — PROGRESS NOTE ADULT - ASSESSMENT
60F w/ end stage COPD on 3LNC (pending transplant list at [facility]), former smoker, CAD s/p stent (2016), afib on eliquis, uncontrolled DM2, raynaud phenomenon and recent hospitalization at [facility] for altered mental status and [facility] presents to [facility] for evaluation of generalized weakness and difficulty walking admit to medicine for further evaluation.

## 2020-08-06 NOTE — PROGRESS NOTE ADULT - ASSESSMENT
61 y/o F with PMH of COPD on home oxygen 4L NC (Pulmonary: Dr. Anita Mathew), AF on Eliquis, CAD s/p PCI recently hospitalized at [facility] for AMS and AURORA.  At discharge, she was sent home on prednisone and returns with complaints of LE edema.   She was noted to have increased WOB and pulmonary was consulted for further management.    1.  Acute on chronic hypoxemic/hypercapnic respiratory failure-  -Etiology: AECOPD  -Patient with very severe COPD with PFTs from 2018 showing an FEV1/FVC of 27%, with an FEV1: 27% and a DLCO: 23  -Clinically, still dyspneic but feels better on BiPAP  -She is 5L NC, saturating 100%  -Cont Duonebs q6 standing and prednisone 40mg QD for now--I suspect given the severity of disease she will take time to improve  -Cont Symbicort and Spiriva  -Would start levofloxacin 500mg QD  -Cont BiPAP at 12/5 at 50% with goal oxygen saturation 88-92%%  -RVP was negative  -Contraction alkalosis improving, can give another dose of diamox if patient appears overloaded today  -Can cont Xanax for anxiety as this is also a component driving some of her some of her symptoms    Parminder Carranza,   Pulmonary Attending

## 2020-08-06 NOTE — PROGRESS NOTE ADULT - SUBJECTIVE AND OBJECTIVE BOX
Interval Events:  Patient wore BiPAP overnight, states she is still feeling SOB  She also endorses LE swelling     REVIEW OF SYSTEMS:  Constitutional:   Eyes:  ENT:  CV:  Resp: sob  GI:  :  MSK:  Integumentary:  Neurological:  Psychiatric:  Endocrine:  Hematologic/Lymphatic:  Allergic/Immunologic:  [x] All other systems negative  [ ] Unable to assess ROS because ________    OBJECTIVE:  ICU Vital Signs Last 24 Hrs  T(C): 36.4 (06 Aug 2020 08:10), Max: 37 (05 Aug 2020 17:51)  T(F): 97.5 (06 Aug 2020 08:10), Max: 98.6 (05 Aug 2020 17:51)  HR: 71 (06 Aug 2020 08:10) (71 - 93)  BP: 169/84 (06 Aug 2020 08:10) (118/73 - 169/84)  BP(mean): --  ABP: --  ABP(mean): --  RR: 19 (06 Aug 2020 08:10) (19 - 20)  SpO2: 100% (06 Aug 2020 08:10) (95% - 100%)        08-05 @ 07:01  -  08-06 @ 07:00  --------------------------------------------------------  IN: 680 mL / OUT: 400 mL / NET: 280 mL      CAPILLARY BLOOD GLUCOSE      POCT Blood Glucose.: 309 mg/dL (06 Aug 2020 12:07)      PHYSICAL EXAM:  General: AAOx3  HEENT: EOMI, PERRL  Lymph Nodes: No LAD  Neck: Supple  Respiratory: poor air entry  Cardiovascular: RRR   Abdomen: soft, NT/ND  Extremities: no c/c/e    HOSPITAL MEDICATIONS:  MEDICATIONS  (STANDING):  acetaZOLAMIDE Injectable 250 milliGRAM(s) IV Push <User Schedule>  albuterol/ipratropium for Nebulization 3 milliLiter(s) Nebulizer every 6 hours  apixaban 5 milliGRAM(s) Oral every 12 hours  aspirin enteric coated 81 milliGRAM(s) Oral daily  atorvastatin 80 milliGRAM(s) Oral at bedtime  budesonide 160 MICROgram(s)/formoterol 4.5 MICROgram(s) Inhaler 2 Puff(s) Inhalation two times a day  cholecalciferol 2000 Unit(s) Oral daily  dextrose 5%. 1000 milliLiter(s) (50 mL/Hr) IV Continuous <Continuous>  dextrose 50% Injectable 12.5 Gram(s) IV Push once  dextrose 50% Injectable 25 Gram(s) IV Push once  dextrose 50% Injectable 25 Gram(s) IV Push once  diltiazem    milliGRAM(s) Oral daily  insulin glargine Injectable (LANTUS) 10 Unit(s) SubCutaneous at bedtime  insulin lispro (HumaLOG) corrective regimen sliding scale   SubCutaneous three times a day before meals  insulin lispro (HumaLOG) corrective regimen sliding scale   SubCutaneous at bedtime  insulin lispro Injectable (HumaLOG) 3 Unit(s) SubCutaneous three times a day before meals  montelukast 10 milliGRAM(s) Oral daily  multivitamin 1 Tablet(s) Oral daily  pantoprazole    Tablet 40 milliGRAM(s) Oral before breakfast  predniSONE   Tablet 40 milliGRAM(s) Oral daily  theophylline ER (24 Hour) 400 milliGRAM(s) Oral daily  tiotropium 18 MICROgram(s) Capsule 1 Capsule(s) Inhalation daily    MEDICATIONS  (PRN):  ALPRAZolam 0.25 milliGRAM(s) Oral every 8 hours PRN anxiety  dextrose 40% Gel 15 Gram(s) Oral once PRN Blood Glucose LESS THAN 70 milliGRAM(s)/deciliter  glucagon  Injectable 1 milliGRAM(s) IntraMuscular once PRN Glucose LESS THAN 70 milligrams/deciliter  polyethylene glycol 3350 17 Gram(s) Oral daily PRN Constipation      LABS:                        8.8    10.11 )-----------( 269      ( 06 Aug 2020 07:06 )             28.9     08-06    136  |  89<L>  |  33<H>  ----------------------------<  173<H>  4.2   |  40<H>  |  1.19    Ca    9.6      06 Aug 2020 07:06          RADIOLOGY:  [x] Reviewed and interpreted by me

## 2020-08-06 NOTE — PROGRESS NOTE ADULT - PROBLEM SELECTOR PLAN 8
- holding lasix for elevated CO2, may need to start acetazolamide in place   - c/w home diltiazem dosing  - c/w metalozone for now

## 2020-08-07 LAB
ANION GAP SERPL CALC-SCNC: 10 MMOL/L — SIGNIFICANT CHANGE UP (ref 5–17)
BUN SERPL-MCNC: 36 MG/DL — HIGH (ref 7–23)
CALCIUM SERPL-MCNC: 9.2 MG/DL — SIGNIFICANT CHANGE UP (ref 8.4–10.5)
CHLORIDE SERPL-SCNC: 94 MMOL/L — LOW (ref 96–108)
CO2 SERPL-SCNC: 35 MMOL/L — HIGH (ref 22–31)
CREAT SERPL-MCNC: 1.07 MG/DL — SIGNIFICANT CHANGE UP (ref 0.5–1.3)
GLUCOSE BLDC GLUCOMTR-MCNC: 175 MG/DL — HIGH (ref 70–99)
GLUCOSE BLDC GLUCOMTR-MCNC: 177 MG/DL — HIGH (ref 70–99)
GLUCOSE BLDC GLUCOMTR-MCNC: 195 MG/DL — HIGH (ref 70–99)
GLUCOSE BLDC GLUCOMTR-MCNC: 235 MG/DL — HIGH (ref 70–99)
GLUCOSE BLDC GLUCOMTR-MCNC: 328 MG/DL — HIGH (ref 70–99)
GLUCOSE BLDC GLUCOMTR-MCNC: 384 MG/DL — HIGH (ref 70–99)
GLUCOSE SERPL-MCNC: 167 MG/DL — HIGH (ref 70–99)
HCT VFR BLD CALC: 31.3 % — LOW (ref 34.5–45)
HGB BLD-MCNC: 9.3 G/DL — LOW (ref 11.5–15.5)
MAGNESIUM SERPL-MCNC: 2 MG/DL — SIGNIFICANT CHANGE UP (ref 1.6–2.6)
MCHC RBC-ENTMCNC: 25.9 PG — LOW (ref 27–34)
MCHC RBC-ENTMCNC: 29.7 GM/DL — LOW (ref 32–36)
MCV RBC AUTO: 87.2 FL — SIGNIFICANT CHANGE UP (ref 80–100)
NRBC # BLD: 0 /100 WBCS — SIGNIFICANT CHANGE UP (ref 0–0)
PLATELET # BLD AUTO: 317 K/UL — SIGNIFICANT CHANGE UP (ref 150–400)
POTASSIUM SERPL-MCNC: 4.4 MMOL/L — SIGNIFICANT CHANGE UP (ref 3.5–5.3)
POTASSIUM SERPL-SCNC: 4.4 MMOL/L — SIGNIFICANT CHANGE UP (ref 3.5–5.3)
RBC # BLD: 3.59 M/UL — LOW (ref 3.8–5.2)
RBC # FLD: 14.9 % — HIGH (ref 10.3–14.5)
SODIUM SERPL-SCNC: 139 MMOL/L — SIGNIFICANT CHANGE UP (ref 135–145)
WBC # BLD: 10.64 K/UL — HIGH (ref 3.8–10.5)
WBC # FLD AUTO: 10.64 K/UL — HIGH (ref 3.8–10.5)

## 2020-08-07 PROCEDURE — 99233 SBSQ HOSP IP/OBS HIGH 50: CPT

## 2020-08-07 RX ORDER — ALPRAZOLAM 0.25 MG
0.25 TABLET ORAL ONCE
Refills: 0 | Status: DISCONTINUED | OUTPATIENT
Start: 2020-08-07 | End: 2020-08-07

## 2020-08-07 RX ADMIN — BUDESONIDE AND FORMOTEROL FUMARATE DIHYDRATE 2 PUFF(S): 160; 4.5 AEROSOL RESPIRATORY (INHALATION) at 06:52

## 2020-08-07 RX ADMIN — Medication 1 TABLET(S): at 11:49

## 2020-08-07 RX ADMIN — APIXABAN 5 MILLIGRAM(S): 2.5 TABLET, FILM COATED ORAL at 17:38

## 2020-08-07 RX ADMIN — Medication 400 MILLIGRAM(S): at 11:46

## 2020-08-07 RX ADMIN — ATORVASTATIN CALCIUM 80 MILLIGRAM(S): 80 TABLET, FILM COATED ORAL at 21:17

## 2020-08-07 RX ADMIN — TIOTROPIUM BROMIDE 1 CAPSULE(S): 18 CAPSULE ORAL; RESPIRATORY (INHALATION) at 11:47

## 2020-08-07 RX ADMIN — ACETAZOLAMIDE 250 MILLIGRAM(S): 250 TABLET ORAL at 11:45

## 2020-08-07 RX ADMIN — Medication 3 MILLILITER(S): at 06:51

## 2020-08-07 RX ADMIN — PANTOPRAZOLE SODIUM 40 MILLIGRAM(S): 20 TABLET, DELAYED RELEASE ORAL at 06:51

## 2020-08-07 RX ADMIN — POLYETHYLENE GLYCOL 3350 17 GRAM(S): 17 POWDER, FOR SOLUTION ORAL at 06:53

## 2020-08-07 RX ADMIN — Medication 3 UNIT(S): at 14:33

## 2020-08-07 RX ADMIN — Medication 1: at 08:30

## 2020-08-07 RX ADMIN — Medication 3 MILLILITER(S): at 17:38

## 2020-08-07 RX ADMIN — Medication 2000 UNIT(S): at 11:49

## 2020-08-07 RX ADMIN — Medication 40 MILLIGRAM(S): at 06:51

## 2020-08-07 RX ADMIN — BUDESONIDE AND FORMOTEROL FUMARATE DIHYDRATE 2 PUFF(S): 160; 4.5 AEROSOL RESPIRATORY (INHALATION) at 17:38

## 2020-08-07 RX ADMIN — Medication 180 MILLIGRAM(S): at 06:51

## 2020-08-07 RX ADMIN — Medication 3 UNIT(S): at 08:31

## 2020-08-07 RX ADMIN — Medication 81 MILLIGRAM(S): at 11:49

## 2020-08-07 RX ADMIN — APIXABAN 5 MILLIGRAM(S): 2.5 TABLET, FILM COATED ORAL at 06:51

## 2020-08-07 RX ADMIN — Medication 0.25 MILLIGRAM(S): at 17:38

## 2020-08-07 RX ADMIN — Medication 10 MILLIGRAM(S): at 11:43

## 2020-08-07 RX ADMIN — Medication 100 MILLIGRAM(S): at 21:17

## 2020-08-07 RX ADMIN — Medication 4: at 14:32

## 2020-08-07 RX ADMIN — Medication 3 MILLILITER(S): at 11:47

## 2020-08-07 RX ADMIN — Medication 5: at 18:03

## 2020-08-07 RX ADMIN — MONTELUKAST 10 MILLIGRAM(S): 4 TABLET, CHEWABLE ORAL at 11:44

## 2020-08-07 RX ADMIN — INSULIN GLARGINE 10 UNIT(S): 100 INJECTION, SOLUTION SUBCUTANEOUS at 22:36

## 2020-08-07 RX ADMIN — Medication 0.25 MILLIGRAM(S): at 11:43

## 2020-08-07 RX ADMIN — Medication 3 UNIT(S): at 18:03

## 2020-08-07 RX ADMIN — Medication 100 MILLIGRAM(S): at 11:49

## 2020-08-07 NOTE — PROGRESS NOTE ADULT - ASSESSMENT
60F w/ end stage COPD on 3LNC (pending transplant list at [facility]), former smoker, CAD s/p stent (2016), afib on eliquis, uncontrolled DM2, raynaud phenomenon and recent hospitalization at [facility] for altered mental status and A[facility]A presents to [facility] for evaluation of generalized weakness and difficulty, being treated for COPD exacerbation, requiring BiPAP, with concerns for steroid induced myopathy:

## 2020-08-07 NOTE — PROGRESS NOTE ADULT - PROBLEM SELECTOR PLAN 1
-from COPD exacerbation  -c/w BIPAP at night   -continue with duonebs, symbicort and spiriva   -continuing steroids for now, will taper once breathing is improved as per pulm  -encouraged to use xanax PRN anxiety, pt willing to take it today   -started on levaquin 500mg daily as per pulm (may help with exacerbation as per pulm), planning for 7 days - discussed with attending today

## 2020-08-07 NOTE — PROGRESS NOTE ADULT - ASSESSMENT
EVAN 1/7/19: no ALINA thrombus, unable to assess LV sys fx  echo 12/7/18: EF 71%, nl LV sys fx, mild diastolic dysfx     a/p    60 year old female with hx of D CHF, HTN, CAD s/p PCI, Afib/ aflutter, s/p Aflutter Ablation 12/2019, COPD, DM2, Anxiety, , raynaud phenomenon presenting with weakness, SOB , hyperkalemia     1. SOB  -r/t COPD   -pulm f/u   -chest xray unremarkable  -covid 19 negative  -po steroids, neb , bipap   -no acs , cv stable   -ecg with known RBBB, new twi noted, hyperkalemia also noted on admission  -echo with normal LVEF, mild diastolic dysfunction     2. CAD s/p PCI   -no cp, no sob  -c/w ASA, statin  -echo noted above     3. Afib/aflutter s/p  Aflutter Ablation 12/2019  -in NSR, cw cardizem  mg daily  -CHADsVac=2, c/w eliquis     4. Acute on Chronic Diastolic CHF   -+dyspnea secondary to copd;  on bipap prn  -echo with normal lVEF   -lasix dc 8/4 for contraction metabolic alkalosis  -++le edema, continues on daily diamox IVPB per primary team     dvt ppx

## 2020-08-07 NOTE — PROGRESS NOTE ADULT - SUBJECTIVE AND OBJECTIVE BOX
CARDIOLOGY FOLLOW UP - Dr. Brunson    CC no cp  unchange sob  / LE edema       PHYSICAL EXAM:  T(C): 36.4 (08-07-20 @ 08:45), Max: 36.8 (08-06-20 @ 22:11)  HR: 91 (08-07-20 @ 08:45) (77 - 91)  BP: 133/68 (08-07-20 @ 08:45) (122/85 - 145/78)  RR: 15 (08-07-20 @ 06:47) (15 - 20)  SpO2: 96% (08-07-20 @ 08:37) (96% - 100%)  Wt(kg): --  I&O's Summary    06 Aug 2020 07:01  -  07 Aug 2020 07:00  --------------------------------------------------------  IN: 320 mL / OUT: 0 mL / NET: 320 mL        Appearance: Normal	  Cardiovascular: Normal S1 S2,RRR, No JVD, No murmurs  Respiratory: diminishe d  Gastrointestinal:  Soft, Non-tender, + BS	  Extremities: bl le edema ++         MEDICATIONS  (STANDING):  acetaZOLAMIDE Injectable 250 milliGRAM(s) IV Push <User Schedule>  albuterol/ipratropium for Nebulization 3 milliLiter(s) Nebulizer every 6 hours  ALPRAZolam 0.25 milliGRAM(s) Oral two times a day  apixaban 5 milliGRAM(s) Oral every 12 hours  aspirin enteric coated 81 milliGRAM(s) Oral daily  atorvastatin 80 milliGRAM(s) Oral at bedtime  bisacodyl Suppository 10 milliGRAM(s) Rectal daily  budesonide 160 MICROgram(s)/formoterol 4.5 MICROgram(s) Inhaler 2 Puff(s) Inhalation two times a day  cholecalciferol 2000 Unit(s) Oral daily  dextrose 5%. 1000 milliLiter(s) (50 mL/Hr) IV Continuous <Continuous>  dextrose 50% Injectable 12.5 Gram(s) IV Push once  dextrose 50% Injectable 25 Gram(s) IV Push once  dextrose 50% Injectable 25 Gram(s) IV Push once  diltiazem    milliGRAM(s) Oral daily  insulin glargine Injectable (LANTUS) 10 Unit(s) SubCutaneous at bedtime  insulin lispro (HumaLOG) corrective regimen sliding scale   SubCutaneous three times a day before meals  insulin lispro (HumaLOG) corrective regimen sliding scale   SubCutaneous at bedtime  insulin lispro Injectable (HumaLOG) 3 Unit(s) SubCutaneous three times a day before meals  levoFLOXacin  Tablet 500 milliGRAM(s) Oral every 24 hours  montelukast 10 milliGRAM(s) Oral daily  multivitamin 1 Tablet(s) Oral daily  pantoprazole    Tablet 40 milliGRAM(s) Oral before breakfast  predniSONE   Tablet 40 milliGRAM(s) Oral daily  theophylline ER (24 Hour) 400 milliGRAM(s) Oral daily  tiotropium 18 MICROgram(s) Capsule 1 Capsule(s) Inhalation daily      TELEMETRY: 	    ECG:  	  RADIOLOGY:   DIAGNOSTIC TESTING:  [ ] Echocardiogram:  [ ]  Catheterization:  [ ] Stress Test:    OTHER: 	    LABS:	 	                            9.3    10.64 )-----------( 317      ( 07 Aug 2020 07:56 )             31.3     08-07    139  |  94<L>  |  36<H>  ----------------------------<  167<H>  4.4   |  35<H>  |  1.07    Ca    9.2      07 Aug 2020 07:56  Mg     2.0     08-07              1.

## 2020-08-07 NOTE — DIETITIAN INITIAL EVALUATION ADULT. - REASON INDICATOR FOR ASSESSMENT
Pt seen for length of stay. Information obtained from chart and previous RD note, pt declined interview at this time stated "not today" provided very limited information.   Pt with end stage COPD pending transplant, former smoker, Afib on eliquis, uncontrolled type 2 DM admitted with weakness and difficulty walking. 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

## 2020-08-07 NOTE — DIETITIAN INITIAL EVALUATION ADULT. - PROBLEM SELECTOR PLAN 5
uncontrolled DM2 with hyperglycemia  - only on oral agents as outpatient  - currently receiving steroids  - start [id number] at bedtime with ISS. will likely need to titrate to standing premeal given steroid use  - cannot start Ace inhibitor or ARB due to hyperkalemia however significant glucosuria on admit UA

## 2020-08-07 NOTE — DIETITIAN INITIAL EVALUATION ADULT. - OTHER INFO
Diet PTA: Unknown diet history, pt declined interview at this time. Per [facility] documents pt's appetite had been reduced but it also seems pt has been making active efforts to lose weight though dieting-will confirm when pt agreeable to interview. Pt with type 2 DM unsure how often pt monitor's BG however noted per [facility] documents that BG has been running high, HgbA1C 7/2 per [facility] lab results 7.8%     Weight: Per [facility] pt with previous weights of 185 lbs (1/8/20), pt seems to have lost weight to latest MD office weight 171 lbs (7/23)-per outpatient MD note was through dieting however noted with decreased appetite as well, unknown timeframe. Pt confirms weight of 172 lbs, pt stated she has recently gained weight due to fluid retention to current admission weight 198.6 lbs (8/1) noted with 1+ danya hand and 2+ danya leg/ankle edema currently.    Per RN no complaints of N+V, pt constipated S/P miralax today-last BM noted 8/3. Pt with no noted food allergies, noted to be taking a multivitamin, vitamin D and coq10 at home. No difficulty chewing/swallowing per RN.     Diet education: deferred at this time per pt request

## 2020-08-07 NOTE — DIETITIAN INITIAL EVALUATION ADULT. - PERTINENT MEDS FT
lantus, humalog pre-meal insulin, humalog corrective scale insulin, dulcolax, miralax, deltasone, lipitor, vitamin D, multivitamin and protonix

## 2020-08-07 NOTE — PROGRESS NOTE ADULT - SUBJECTIVE AND OBJECTIVE BOX
Interval Events:    REVIEW OF SYSTEMS:  Constitutional:   Eyes:  ENT:  CV:  Resp:  GI:  :  MSK:  Integumentary:  Neurological:  Psychiatric:  Endocrine:  Hematologic/Lymphatic:  Allergic/Immunologic:  [x] All other systems negative  [ ] Unable to assess ROS because ________    OBJECTIVE:  ICU Vital Signs Last 24 Hrs  T(C): 36.4 (07 Aug 2020 08:45), Max: 36.8 (06 Aug 2020 22:11)  T(F): 97.6 (07 Aug 2020 08:45), Max: 98.2 (06 Aug 2020 22:11)  HR: 91 (07 Aug 2020 08:45) (77 - 91)  BP: 133/68 (07 Aug 2020 08:45) (122/85 - 145/78)  BP(mean): --  ABP: --  ABP(mean): --  RR: 15 (07 Aug 2020 06:47) (15 - 20)  SpO2: 96% (07 Aug 2020 08:37) (96% - 100%)        08-06 @ 07:01  -  08-07 @ 07:00  --------------------------------------------------------  IN: 320 mL / OUT: 0 mL / NET: 320 mL      CAPILLARY BLOOD GLUCOSE      POCT Blood Glucose.: 175 mg/dL (07 Aug 2020 08:19)      PHYSICAL EXAM:  General: AAOx3  HEENT: EOMI, PERRL  Lymph Nodes: No LAD  Neck: Supple  Respiratory:   Cardiovascular: RRR   Abdomen: soft, NT/ND  Extremities: no c/c/e    HOSPITAL MEDICATIONS:  MEDICATIONS  (STANDING):  acetaZOLAMIDE Injectable 250 milliGRAM(s) IV Push <User Schedule>  albuterol/ipratropium for Nebulization 3 milliLiter(s) Nebulizer every 6 hours  ALPRAZolam 0.25 milliGRAM(s) Oral two times a day  apixaban 5 milliGRAM(s) Oral every 12 hours  aspirin enteric coated 81 milliGRAM(s) Oral daily  atorvastatin 80 milliGRAM(s) Oral at bedtime  budesonide 160 MICROgram(s)/formoterol 4.5 MICROgram(s) Inhaler 2 Puff(s) Inhalation two times a day  cholecalciferol 2000 Unit(s) Oral daily  dextrose 5%. 1000 milliLiter(s) (50 mL/Hr) IV Continuous <Continuous>  dextrose 50% Injectable 12.5 Gram(s) IV Push once  dextrose 50% Injectable 25 Gram(s) IV Push once  dextrose 50% Injectable 25 Gram(s) IV Push once  diltiazem    milliGRAM(s) Oral daily  insulin glargine Injectable (LANTUS) 10 Unit(s) SubCutaneous at bedtime  insulin lispro (HumaLOG) corrective regimen sliding scale   SubCutaneous three times a day before meals  insulin lispro (HumaLOG) corrective regimen sliding scale   SubCutaneous at bedtime  insulin lispro Injectable (HumaLOG) 3 Unit(s) SubCutaneous three times a day before meals  levoFLOXacin  Tablet 500 milliGRAM(s) Oral every 24 hours  montelukast 10 milliGRAM(s) Oral daily  multivitamin 1 Tablet(s) Oral daily  pantoprazole    Tablet 40 milliGRAM(s) Oral before breakfast  predniSONE   Tablet 40 milliGRAM(s) Oral daily  theophylline ER (24 Hour) 400 milliGRAM(s) Oral daily  tiotropium 18 MICROgram(s) Capsule 1 Capsule(s) Inhalation daily    MEDICATIONS  (PRN):  dextrose 40% Gel 15 Gram(s) Oral once PRN Blood Glucose LESS THAN 70 milliGRAM(s)/deciliter  glucagon  Injectable 1 milliGRAM(s) IntraMuscular once PRN Glucose LESS THAN 70 milligrams/deciliter  polyethylene glycol 3350 17 Gram(s) Oral daily PRN Constipation      LABS:                        9.3    10.64 )-----------( 317      ( 07 Aug 2020 07:56 )             31.3     08-07    139  |  94<L>  |  36<H>  ----------------------------<  167<H>  4.4   |  35<H>  |  1.07    Ca    9.2      07 Aug 2020 07:56  Mg     2.0     08-07                RADIOLOGY:  [x] Reviewed and interpreted by me Interval Events:  Patient with no significant events overnight  Patient states she feels more comfortable today  REVIEW OF SYSTEMS:  Constitutional:   Eyes:  ENT:  CV:  Resp: sob (improved)  GI:  :  MSK:  Integumentary:  Neurological:  Psychiatric:  Endocrine:  Hematologic/Lymphatic:  Allergic/Immunologic:  [x] All other systems negative  [ ] Unable to assess ROS because ________    OBJECTIVE:  ICU Vital Signs Last 24 Hrs  T(C): 36.4 (07 Aug 2020 08:45), Max: 36.8 (06 Aug 2020 22:11)  T(F): 97.6 (07 Aug 2020 08:45), Max: 98.2 (06 Aug 2020 22:11)  HR: 91 (07 Aug 2020 08:45) (77 - 91)  BP: 133/68 (07 Aug 2020 08:45) (122/85 - 145/78)  BP(mean): --  ABP: --  ABP(mean): --  RR: 15 (07 Aug 2020 06:47) (15 - 20)  SpO2: 96% (07 Aug 2020 08:37) (96% - 100%)        08-06 @ 07:01  -  08-07 @ 07:00  --------------------------------------------------------  IN: 320 mL / OUT: 0 mL / NET: 320 mL      CAPILLARY BLOOD GLUCOSE      POCT Blood Glucose.: 175 mg/dL (07 Aug 2020 08:19)      PHYSICAL EXAM:  General: AAOx3  HEENT: EOMI, PERRL  Lymph Nodes: No LAD  Neck: Supple  Respiratory: improved air entry  Cardiovascular: RRR   Abdomen: soft, NT/ND  Extremities: 1+ edema    HOSPITAL MEDICATIONS:  MEDICATIONS  (STANDING):  acetaZOLAMIDE Injectable 250 milliGRAM(s) IV Push <User Schedule>  albuterol/ipratropium for Nebulization 3 milliLiter(s) Nebulizer every 6 hours  ALPRAZolam 0.25 milliGRAM(s) Oral two times a day  apixaban 5 milliGRAM(s) Oral every 12 hours  aspirin enteric coated 81 milliGRAM(s) Oral daily  atorvastatin 80 milliGRAM(s) Oral at bedtime  budesonide 160 MICROgram(s)/formoterol 4.5 MICROgram(s) Inhaler 2 Puff(s) Inhalation two times a day  cholecalciferol 2000 Unit(s) Oral daily  dextrose 5%. 1000 milliLiter(s) (50 mL/Hr) IV Continuous <Continuous>  dextrose 50% Injectable 12.5 Gram(s) IV Push once  dextrose 50% Injectable 25 Gram(s) IV Push once  dextrose 50% Injectable 25 Gram(s) IV Push once  diltiazem    milliGRAM(s) Oral daily  insulin glargine Injectable (LANTUS) 10 Unit(s) SubCutaneous at bedtime  insulin lispro (HumaLOG) corrective regimen sliding scale   SubCutaneous three times a day before meals  insulin lispro (HumaLOG) corrective regimen sliding scale   SubCutaneous at bedtime  insulin lispro Injectable (HumaLOG) 3 Unit(s) SubCutaneous three times a day before meals  levoFLOXacin  Tablet 500 milliGRAM(s) Oral every 24 hours  montelukast 10 milliGRAM(s) Oral daily  multivitamin 1 Tablet(s) Oral daily  pantoprazole    Tablet 40 milliGRAM(s) Oral before breakfast  predniSONE   Tablet 40 milliGRAM(s) Oral daily  theophylline ER (24 Hour) 400 milliGRAM(s) Oral daily  tiotropium 18 MICROgram(s) Capsule 1 Capsule(s) Inhalation daily    MEDICATIONS  (PRN):  dextrose 40% Gel 15 Gram(s) Oral once PRN Blood Glucose LESS THAN 70 milliGRAM(s)/deciliter  glucagon  Injectable 1 milliGRAM(s) IntraMuscular once PRN Glucose LESS THAN 70 milligrams/deciliter  polyethylene glycol 3350 17 Gram(s) Oral daily PRN Constipation      LABS:                        9.3    10.64 )-----------( 317      ( 07 Aug 2020 07:56 )             31.3     08-07    139  |  94<L>  |  36<H>  ----------------------------<  167<H>  4.4   |  35<H>  |  1.07    Ca    9.2      07 Aug 2020 07:56  Mg     2.0     08-07        RADIOLOGY:  [x] Reviewed and interpreted by me

## 2020-08-07 NOTE — PROGRESS NOTE ADULT - ASSESSMENT
61 y/o F with PMH of COPD on home oxygen 4L NC (Pulmonary: Dr. Anita Mathew), AF on Eliquis, CAD s/p PCI recently hospitalized at [facility] for AMS and AURORA.  At discharge, she was sent home on prednisone and returns with complaints of LE edema.   She was noted to have increased WOB and pulmonary was consulted for further management.    1.  Acute on chronic hypoxemic/hypercapnic respiratory failure-  -Etiology: AECOPD  -Patient with very severe COPD with PFTs from 2018 showing an FEV1/FVC of 27%, with an FEV1: 27% and a DLCO: 23  -Clinically,   -She is   -Cont Duonebs q6 standing and prednisone 40mg QD for now--I suspect given the severity of disease she will take time to improve  -Cont Symbicort and Spiriva  -Cont levofloxacin 500mg QD  -Cont BiPAP at 12/5 at 50% with goal oxygen saturation 88-92%%  -RVP was negative, COVID-19 negative  -Contraction alkalosis improving, can give another dose of diamox if patient appears overloaded today  -Can cont Xanax for anxiety as this is also a component driving some of her some of her symptoms    Parminder Carranza DO  Pulmonary Attending 59 y/o F with PMH of COPD on home oxygen 4L NC (Pulmonary: Dr. Anita Mathew), AF on Eliquis, CAD s/p PCI recently hospitalized at [facility] for AMS and AUR[facility]A.  At discharge, she was sent home on prednisone and returns with complaints of LE edema.   She was noted to have increased WOB and pulmonary was consulted for further management.    1.  Acute on chronic hypoxemic/hypercapnic respiratory failure-  -Etiology: AECOPD  -Patient has very severe COPD with PFTs from 2018 showing an FEV1/FVC of 27%, with an FEV1: 27% and a DLCO: 23  -Clinically, she appears more comfortable on BiPAP  -She is on an FiO2 of 50% on NIPPV  -Cont Duonebs q6 standing and prednisone 40mg QD for now--I suspect given the severity of disease she will take time to improve  -Cont Symbicort and Spiriva  -Cont levofloxacin 500mg QD for 5-7 days  -Cont BiPAP at 12/5 at 50% at night and as needed during the day with goal oxygen saturation 88-92%%  -RVP was negative, COVID-19 negative  -Can cont Xanax prn for anxiety     Parminder Carranza DO  Pulmonary Attending

## 2020-08-07 NOTE — PROGRESS NOTE ADULT - PROBLEM SELECTOR PLAN 2
- report of generalized weakness as well as leg weakness. Exam with diminished strength in left hip flexor 3-4/5.  - no urinary/bowel incontinence, no saddle anesthesia, no reported fall/trauma, and reports recent normal CT head this week. Patient REFUSES MR imaging for further evaluation at time of medicine admit despite recommendation.   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 Outpatient lab work this month with TSH,b12,folate, iron wnl.  - Patient currently being tapered off of steroids (received IV steroid at [facility]) now on prednisone 40d. Given end-stage COPD would favor conservative steroid taper.  - per chart review: TTE 2018 and EVAN 2019 demonstrate normal LV/RV systolic function (home lasix is for HTN)  -PT eval is in progress  -neurology recommending EMG as an outpatient, and outpt f/u  -not ready to start taper yet as respiratory status is still poor

## 2020-08-07 NOTE — DIETITIAN INITIAL EVALUATION ADULT. - PROBLEM SELECTOR PLAN 1
- report of generalized weakness as well as leg weakness. Exam with diminished strength in left hip flexor 3-4/5.  - no urinary/bowel incontinence, no saddle anesthesia, no reported fall/trauma, and reports recent normal CT head this week. Patient REFUSES MR imaging for further evaluation at time of medicine admit despite recommendation. Patient however is agreeable to neurology evaluation in the morning.   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 Outpatient lab work this month with TSH,b12,folate, iron wnl.  - Plan for neurologic evaluation in am.  - Patient currently being tapered off of steroids (received IV steroid at [facility]) now on prednisone 40d. Given end-stage COPD would favor conservative steroid taper.  - unable to obtain collateral from family- brother Michele Vega listed as contact and per patient is brother to communicate with. Could not reach with listed number and by number provided by patient  [phone].  - may require psych eval- reports of concern for hallucination in outpatient chart, recent confusion, prednisone use, on exam labile and at times argumentative, discrepancy with strength of left hip flexor during interview (able to uncross leg) but then limited ability lift left leg off bed during exam). Will need day team to reassess in am and reattempt to obtain collateral from family.  - per chart review: TTE 2018 and EVAN 2019 demonstrate normal LV/RV systolic function (home lasix is for HTN)

## 2020-08-07 NOTE — PROGRESS NOTE ADULT - PROBLEM SELECTOR PLAN 8
- holding lasix for elevated CO2, on acetazolamide in place of lasix   - c/w home diltiazem dosing  - c/w metalozone for now

## 2020-08-07 NOTE — PROGRESS NOTE ADULT - ATTENDING COMMENTS
Agree with above NP note.  cv stable  continued dyspnea   off lasix due to alkalosis  diamox per medicine  echo with normal lv, valve function

## 2020-08-07 NOTE — DIETITIAN INITIAL EVALUATION ADULT. - PROBLEM SELECTOR PLAN 3
- repeat BMP  - no peak T wave on admit ekg (does have RBBB, suspect from endstage COPD)  - give lokelma and IV lasix  - dose lantus

## 2020-08-07 NOTE — DIETITIAN INITIAL EVALUATION ADULT. - ADD RECOMMEND
1. Obtain subjective information as pt is agreeable, 2. Continue to encourage po intake and obtain/honor food preferences as able, 3. Monitor PO intake, diet tolerance, weight trends, labs, and skin integrity

## 2020-08-07 NOTE — PROGRESS NOTE ADULT - SUBJECTIVE AND OBJECTIVE BOX
Patient is a 60y old  Female who presents with a chief complaint of 60F p/w generalized weakness and difficulty walking (07 Aug 2020 10:50)    SUBJECTIVE / OVERNIGHT EVENTS: no acute events overnight, pt declined xanax. Reports constipation, no BM over the past 2 day despite bowel regimen.     MEDICATIONS  (STANDING):  acetaZOLAMIDE Injectable 250 milliGRAM(s) IV Push <User Schedule>  albuterol/ipratropium for Nebulization 3 milliLiter(s) Nebulizer every 6 hours  ALPRAZolam 0.25 milliGRAM(s) Oral once  ALPRAZolam 0.25 milliGRAM(s) Oral two times a day  apixaban 5 milliGRAM(s) Oral every 12 hours  aspirin enteric coated 81 milliGRAM(s) Oral daily  atorvastatin 80 milliGRAM(s) Oral at bedtime  bisacodyl Suppository 10 milliGRAM(s) Rectal daily  budesonide 160 MICROgram(s)/formoterol 4.5 MICROgram(s) Inhaler 2 Puff(s) Inhalation two times a day  cholecalciferol 2000 Unit(s) Oral daily  dextrose 5%. 1000 milliLiter(s) (50 mL/Hr) IV Continuous <Continuous>  dextrose 50% Injectable 12.5 Gram(s) IV Push once  dextrose 50% Injectable 25 Gram(s) IV Push once  dextrose 50% Injectable 25 Gram(s) IV Push once  diltiazem    milliGRAM(s) Oral daily  insulin glargine Injectable (LANTUS) 10 Unit(s) SubCutaneous at bedtime  insulin lispro (HumaLOG) corrective regimen sliding scale   SubCutaneous three times a day before meals  insulin lispro (HumaLOG) corrective regimen sliding scale   SubCutaneous at bedtime  insulin lispro Injectable (HumaLOG) 3 Unit(s) SubCutaneous three times a day before meals  levoFLOXacin  Tablet 500 milliGRAM(s) Oral every 24 hours  montelukast 10 milliGRAM(s) Oral daily  multivitamin 1 Tablet(s) Oral daily  pantoprazole    Tablet 40 milliGRAM(s) Oral before breakfast  predniSONE   Tablet 40 milliGRAM(s) Oral daily  theophylline ER (24 Hour) 400 milliGRAM(s) Oral daily  tiotropium 18 MICROgram(s) Capsule 1 Capsule(s) Inhalation daily    MEDICATIONS  (PRN):  dextrose 40% Gel 15 Gram(s) Oral once PRN Blood Glucose LESS THAN 70 milliGRAM(s)/deciliter  glucagon  Injectable 1 milliGRAM(s) IntraMuscular once PRN Glucose LESS THAN 70 milligrams/deciliter  guaiFENesin   Syrup  (Sugar-Free) 100 milliGRAM(s) Oral every 6 hours PRN Cough  polyethylene glycol 3350 17 Gram(s) Oral daily PRN Constipation      Vital Signs Last 24 Hrs  T(C): 36.4 (07 Aug 2020 08:45), Max: 36.8 (06 Aug 2020 22:11)  T(F): 97.6 (07 Aug 2020 08:45), Max: 98.2 (06 Aug 2020 22:11)  HR: 91 (07 Aug 2020 08:45) (77 - 91)  BP: 133/68 (07 Aug 2020 08:45) (122/85 - 145/78)  BP(mean): --  RR: 15 (07 Aug 2020 06:47) (15 - 20)  SpO2: 96% (07 Aug 2020 08:37) (96% - 100%)  CAPILLARY BLOOD GLUCOSE      POCT Blood Glucose.: 175 mg/dL (07 Aug 2020 08:19)  POCT Blood Glucose.: 189 mg/dL (06 Aug 2020 21:40)  POCT Blood Glucose.: 269 mg/dL (06 Aug 2020 17:23)  POCT Blood Glucose.: 274 mg/dL (06 Aug 2020 16:56)  POCT Blood Glucose.: 309 mg/dL (06 Aug 2020 12:07)    I&O's Summary    06 Aug 2020 07:01  -  07 Aug 2020 07:00  --------------------------------------------------------  IN: 320 mL / OUT: 0 mL / NET: 320 mL      PHYSICAL EXAM:  GENERAL: NAD  EYES: conjunctiva and sclera clear  CHEST/LUNG: Clear to auscultation bilaterally; No wheeze  HEART: +S1/S2, reg   ABDOMEN: Soft, Nontender, Nondistended  EXTREMITIES: +b/l LE edema   PSYCH: AAOx3    LABS:                        9.3    10.64 )-----------( 317      ( 07 Aug 2020 07:56 )             31.3     08-07    139  |  94<L>  |  36<H>  ----------------------------<  167<H>  4.4   |  35<H>  |  1.07    Ca    9.2      07 Aug 2020 07:56  Mg     2.0     08-07

## 2020-08-07 NOTE — DIETITIAN INITIAL EVALUATION ADULT. - PHYSICAL APPEARANCE
obese/other (specify)/Physical exam deferred-pt declined interview Ht: 5'4", Wt: 171 lbs (per [facility] HI 7/23), current weight elevated likely due to fluids, BMI: 29.3 kg/m2, IBW: 120 lbs +/- 10%, %IBW: 143%  1+ danya hand, 2+ danya leg/ankle edema, Skin intact per nursing flowsheets

## 2020-08-08 LAB
DSDNA AB SER QL CLIF: NEGATIVE — SIGNIFICANT CHANGE UP
GLUCOSE BLDC GLUCOMTR-MCNC: 202 MG/DL — HIGH (ref 70–99)
GLUCOSE BLDC GLUCOMTR-MCNC: 269 MG/DL — HIGH (ref 70–99)
GLUCOSE BLDC GLUCOMTR-MCNC: 279 MG/DL — HIGH (ref 70–99)
GLUCOSE BLDC GLUCOMTR-MCNC: 285 MG/DL — HIGH (ref 70–99)
GLUCOSE BLDC GLUCOMTR-MCNC: 321 MG/DL — HIGH (ref 70–99)

## 2020-08-08 PROCEDURE — 74018 RADEX ABDOMEN 1 VIEW: CPT | Mod: 26

## 2020-08-08 PROCEDURE — 99233 SBSQ HOSP IP/OBS HIGH 50: CPT

## 2020-08-08 RX ORDER — SALIVA SUBSTITUTE COMB NO.11 351 MG
5 POWDER IN PACKET (EA) MUCOUS MEMBRANE
Refills: 0 | Status: DISCONTINUED | OUTPATIENT
Start: 2020-08-08 | End: 2020-09-14

## 2020-08-08 RX ORDER — SENNA PLUS 8.6 MG/1
2 TABLET ORAL AT BEDTIME
Refills: 0 | Status: DISCONTINUED | OUTPATIENT
Start: 2020-08-08 | End: 2020-09-14

## 2020-08-08 RX ORDER — MINERAL OIL
133 OIL (ML) MISCELLANEOUS ONCE
Refills: 0 | Status: COMPLETED | OUTPATIENT
Start: 2020-08-08 | End: 2020-08-08

## 2020-08-08 RX ADMIN — Medication 3 MILLILITER(S): at 23:31

## 2020-08-08 RX ADMIN — APIXABAN 5 MILLIGRAM(S): 2.5 TABLET, FILM COATED ORAL at 06:17

## 2020-08-08 RX ADMIN — Medication 5 MILLILITER(S): at 17:37

## 2020-08-08 RX ADMIN — Medication 81 MILLIGRAM(S): at 12:54

## 2020-08-08 RX ADMIN — Medication 3 MILLILITER(S): at 12:54

## 2020-08-08 RX ADMIN — APIXABAN 5 MILLIGRAM(S): 2.5 TABLET, FILM COATED ORAL at 17:38

## 2020-08-08 RX ADMIN — Medication 40 MILLIGRAM(S): at 06:17

## 2020-08-08 RX ADMIN — ACETAZOLAMIDE 250 MILLIGRAM(S): 250 TABLET ORAL at 12:53

## 2020-08-08 RX ADMIN — BUDESONIDE AND FORMOTEROL FUMARATE DIHYDRATE 2 PUFF(S): 160; 4.5 AEROSOL RESPIRATORY (INHALATION) at 17:38

## 2020-08-08 RX ADMIN — ATORVASTATIN CALCIUM 80 MILLIGRAM(S): 80 TABLET, FILM COATED ORAL at 23:31

## 2020-08-08 RX ADMIN — BUDESONIDE AND FORMOTEROL FUMARATE DIHYDRATE 2 PUFF(S): 160; 4.5 AEROSOL RESPIRATORY (INHALATION) at 06:17

## 2020-08-08 RX ADMIN — PANTOPRAZOLE SODIUM 40 MILLIGRAM(S): 20 TABLET, DELAYED RELEASE ORAL at 06:17

## 2020-08-08 RX ADMIN — Medication 3 MILLILITER(S): at 17:38

## 2020-08-08 RX ADMIN — Medication 3 MILLILITER(S): at 06:17

## 2020-08-08 RX ADMIN — Medication 3 MILLILITER(S): at 00:08

## 2020-08-08 RX ADMIN — Medication 400 MILLIGRAM(S): at 12:54

## 2020-08-08 RX ADMIN — MONTELUKAST 10 MILLIGRAM(S): 4 TABLET, CHEWABLE ORAL at 12:54

## 2020-08-08 RX ADMIN — Medication 1 TABLET(S): at 12:55

## 2020-08-08 RX ADMIN — Medication 10 MILLIGRAM(S): at 12:55

## 2020-08-08 RX ADMIN — Medication 3 UNIT(S): at 08:41

## 2020-08-08 RX ADMIN — Medication 3: at 19:39

## 2020-08-08 RX ADMIN — SENNA PLUS 2 TABLET(S): 8.6 TABLET ORAL at 23:31

## 2020-08-08 RX ADMIN — INSULIN GLARGINE 10 UNIT(S): 100 INJECTION, SOLUTION SUBCUTANEOUS at 23:29

## 2020-08-08 RX ADMIN — Medication 0.25 MILLIGRAM(S): at 06:06

## 2020-08-08 RX ADMIN — Medication 180 MILLIGRAM(S): at 06:17

## 2020-08-08 RX ADMIN — Medication 2: at 08:41

## 2020-08-08 RX ADMIN — Medication 2: at 22:19

## 2020-08-08 RX ADMIN — Medication 2000 UNIT(S): at 12:54

## 2020-08-08 RX ADMIN — Medication 3: at 12:52

## 2020-08-08 RX ADMIN — TIOTROPIUM BROMIDE 1 CAPSULE(S): 18 CAPSULE ORAL; RESPIRATORY (INHALATION) at 12:55

## 2020-08-08 RX ADMIN — Medication 100 MILLIGRAM(S): at 08:41

## 2020-08-08 RX ADMIN — Medication 0.25 MILLIGRAM(S): at 17:37

## 2020-08-08 RX ADMIN — Medication 3 UNIT(S): at 12:53

## 2020-08-08 RX ADMIN — Medication 100 MILLIGRAM(S): at 17:41

## 2020-08-08 RX ADMIN — POLYETHYLENE GLYCOL 3350 17 GRAM(S): 17 POWDER, FOR SOLUTION ORAL at 06:17

## 2020-08-08 NOTE — PROGRESS NOTE ADULT - PROBLEM SELECTOR PLAN 1
-from COPD exacerbation  -c/w BIPAP at night   -continue with duonebs, symbicort and spiriva   -continuing steroids for now, will taper once breathing is improved as per pulm  -encouraged to use xanax PRN anxiety  -c/w levaquin 500mg daily as per pulm (may help with exacerbation as per pulm), planning for 7 days.  -Consider adding PCP PPx since on prolonged steroids.

## 2020-08-08 NOTE — PROGRESS NOTE ADULT - PROBLEM SELECTOR PLAN 2
- report of generalized weakness as well as leg weakness. Exam with diminished strength in left hip flexor 3-4/5.  - no urinary/bowel incontinence, no saddle anesthesia, no reported fall/trauma, and reports recent normal CT head this week. Patient REFUSED MR imaging for further evaluation at time of medicine admit despite recommendation.   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 Outpatient lab work this month with TSH,b12,folate, iron wnl.  - Patient currently being tapered off of steroids (received IV steroid at [facility]) now on prednisone 40d. Given end-stage COPD would favor conservative steroid taper.  - per chart review: TTE 2018 and EVAN 2019 demonstrate normal LV/RV systolic function (home lasix is for HTN)  -PT eval  -neurology recommending EMG as an outpatient, and outpt f/u  -not ready to start taper yet as respiratory status is still poor

## 2020-08-08 NOTE — PROGRESS NOTE ADULT - SUBJECTIVE AND OBJECTIVE BOX
Patient is a 60y old  Female who presents with a chief complaint of 60F p/w generalized weakness and difficulty walking (08 Aug 2020 11:45)        SUBJECTIVE / OVERNIGHT EVENTS: Patient reports some SOB. LE edema. Constipation.       MEDICATIONS  (STANDING):  acetaZOLAMIDE Injectable 250 milliGRAM(s) IV Push <User Schedule>  albuterol/ipratropium for Nebulization 3 milliLiter(s) Nebulizer every 6 hours  ALPRAZolam 0.25 milliGRAM(s) Oral two times a day  apixaban 5 milliGRAM(s) Oral every 12 hours  aspirin enteric coated 81 milliGRAM(s) Oral daily  atorvastatin 80 milliGRAM(s) Oral at bedtime  Biotene Dry Mouth Oral Rinse 5 milliLiter(s) Swish and Spit two times a day  bisacodyl Suppository 10 milliGRAM(s) Rectal daily  budesonide 160 MICROgram(s)/formoterol 4.5 MICROgram(s) Inhaler 2 Puff(s) Inhalation two times a day  cholecalciferol 2000 Unit(s) Oral daily  dextrose 5%. 1000 milliLiter(s) (50 mL/Hr) IV Continuous <Continuous>  dextrose 50% Injectable 12.5 Gram(s) IV Push once  dextrose 50% Injectable 25 Gram(s) IV Push once  dextrose 50% Injectable 25 Gram(s) IV Push once  diltiazem    milliGRAM(s) Oral daily  insulin glargine Injectable (LANTUS) 10 Unit(s) SubCutaneous at bedtime  insulin lispro (HumaLOG) corrective regimen sliding scale   SubCutaneous three times a day before meals  insulin lispro (HumaLOG) corrective regimen sliding scale   SubCutaneous at bedtime  insulin lispro Injectable (HumaLOG) 3 Unit(s) SubCutaneous three times a day before meals  levoFLOXacin  Tablet 500 milliGRAM(s) Oral every 24 hours  mineral oil enema 133 milliLiter(s) Rectal once  montelukast 10 milliGRAM(s) Oral daily  multivitamin 1 Tablet(s) Oral daily  pantoprazole    Tablet 40 milliGRAM(s) Oral before breakfast  predniSONE   Tablet 40 milliGRAM(s) Oral daily  senna 2 Tablet(s) Oral at bedtime  theophylline ER (24 Hour) 400 milliGRAM(s) Oral daily  tiotropium 18 MICROgram(s) Capsule 1 Capsule(s) Inhalation daily    MEDICATIONS  (PRN):  dextrose 40% Gel 15 Gram(s) Oral once PRN Blood Glucose LESS THAN 70 milliGRAM(s)/deciliter  glucagon  Injectable 1 milliGRAM(s) IntraMuscular once PRN Glucose LESS THAN 70 milligrams/deciliter  guaiFENesin   Syrup  (Sugar-Free) 100 milliGRAM(s) Oral every 6 hours PRN Cough  polyethylene glycol 3350 17 Gram(s) Oral daily PRN Constipation      Vital Signs Last 24 Hrs  T(C): 36.8 (08 Aug 2020 23:53), Max: 36.9 (08 Aug 2020 17:08)  T(F): 98.2 (08 Aug 2020 23:53), Max: 98.5 (08 Aug 2020 17:08)  HR: 86 (08 Aug 2020 23:53) (75 - 95)  BP: 106/62 (08 Aug 2020 23:53) (106/62 - 136/73)  BP(mean): --  RR: 20 (08 Aug 2020 23:53) (18 - 20)  SpO2: 100% (08 Aug 2020 23:53) (93% - 100%)  CAPILLARY BLOOD GLUCOSE      POCT Blood Glucose.: 269 mg/dL (08 Aug 2020 23:16)  POCT Blood Glucose.: 321 mg/dL (08 Aug 2020 21:46)  POCT Blood Glucose.: 285 mg/dL (08 Aug 2020 18:54)  POCT Blood Glucose.: 279 mg/dL (08 Aug 2020 12:26)  POCT Blood Glucose.: 202 mg/dL (08 Aug 2020 08:34)    I&O's Summary    07 Aug 2020 07:01  -  08 Aug 2020 07:00  --------------------------------------------------------  IN: 440 mL / OUT: 1800 mL / NET: -1360 mL    08 Aug 2020 07:01  -  09 Aug 2020 05:52  --------------------------------------------------------  IN: 0 mL / OUT: 600 mL / NET: -600 mL          PHYSICAL EXAM:   GENERAL: NAD, well-developed  HEAD:  Atraumatic, Normocephalic  EYES: Conjunctiva and sclera clear  NECK: Supple   CHEST/LUNG: Distant BS  HEART: S1S2 normal. Irregular rate and rhythm; No murmurs, rubs, or gallops  ABDOMEN: Soft, obese, Nontender, Nondistended; Bowel sounds present  EXTREMITIES:  2-3+ LE edema  PSYCH/Neuro: AAOx3. Non-focal.   SKIN: No rashes or lesions      LABS:                        9.3    10.64 )-----------( 317      ( 07 Aug 2020 07:56 )             31.3     08-07    139  |  94<L>  |  36<H>  ----------------------------<  167<H>  4.4   |  35<H>  |  1.07    Ca    9.2      07 Aug 2020 07:56  Mg     2.0     08-07          RADIOLOGY & ADDITIONAL TESTS:    Imaging Personally Reviewed:  Consultant(s) Notes Reviewed:  Cardio, pulm  Care Discussed with Consultants/Other Providers:

## 2020-08-08 NOTE — PROGRESS NOTE ADULT - PROBLEM SELECTOR PLAN 8
- holding lasix for elevated CO2, on acetazolamide in place of lasix, consider increasing.    - c/w home diltiazem dosing  - Metolazone has not been given.   -Strict I's/O's and daily weights.

## 2020-08-08 NOTE — PROGRESS NOTE ADULT - ATTENDING COMMENTS
Lokesh Stone MD  Division of [facility] Medicine  Cell: [phone]  Pager: [phone]  Office: [phone]

## 2020-08-08 NOTE — CHART NOTE - NSCHARTNOTEFT_GEN_A_CORE
CC:    HPI: Asked by RN to evaluate patient for . Patient seen and examined at the bedside. Denies fevers/chills, weakness, diaphoresis, headaches, dizziness, syncope, paresthesias, chest pain, palpitations, dyspnea, abdominal pain, N/V/D.     Vital Signs Last 24 Hrs  T(C): 36.9 (08 Aug 2020 17:08), Max: 36.9 (08 Aug 2020 17:08)  T(F): 98.5 (08 Aug 2020 17:08), Max: 98.5 (08 Aug 2020 17:08)  HR: 82 (08 Aug 2020 18:22) (75 - 95)  BP: 126/77 (08 Aug 2020 17:08) (120/67 - 130/79)  RR: 20 (08 Aug 2020 17:08) (16 - 20)  SpO2: 94% (08 Aug 2020 18:22) (93% - 100%)    PHYSICAL EXAM:  General: NAD, A&Ox3  Respiratory: Lungs clear to auscultation bilaterally. No wheezes, rales, rhonchi. Normal respiratory effort.   Cardiovascular: S1, S2 present. Regular rate and rhythm. No murmurs, rubs, or gallops  Gastrointestinal: BS x4 normoactive. Soft, non-tender, non-distended.   Extremities: 2+ peripheral pulses. No edema, cyanosis.    A/P  60F w/ end stage COPD on 3LNC (pending transplant list at [facility]), former smoker, CAD s/p stent (2016), afib on eliquis, uncontrolled DM2, raynaud phenomenon and recent hospitalization at [facility] for altered mental status and AUR[facility]A presents to [facility] for evaluation of generalized weakness and difficulty, being treated for COPD exacerbation, requiring BiPAP, with concerns for steroid induced myopathy:     #  -Repeat vitals were stable  -Discussed with RN  -Will continue to monitor  -Will endorse to AM team      Eryn Nieto PA-C  # CC: Constipation    HPI: Asked by RN to evaluate patient for constipation. Patient seen and examined at the bedside. Denies fevers/chills, weakness, diaphoresis, headaches, dizziness, syncope, paresthesias, chest pain, palpitations, dyspnea, abdominal pain, N/V/D.     Vital Signs Last 24 Hrs  T(C): 36.7 (08 Aug 2020 21:43), Max: 36.9 (08 Aug 2020 17:08)  T(F): 98.1 (08 Aug 2020 21:43), Max: 98.5 (08 Aug 2020 17:08)  HR: 87 (08 Aug 2020 21:43) (75 - 95)  BP: 136/73 (08 Aug 2020 21:43) (120/67 - 136/73)  RR: 20 (08 Aug 2020 21:43) (16 - 20)  SpO2: 100% (08 Aug 2020 21:43) (93% - 100%)    PHYSICAL EXAM:  General: NAD, A&Ox3  Respiratory: Lungs clear to auscultation bilaterally. No wheezes, rales, rhonchi. Normal respiratory effort.   Cardiovascular: S1, S2 present. Regular rate and rhythm. No murmurs, rubs, or gallops  Gastrointestinal: BS x4 normoactive. Soft, non-tender, non-distended.   Extremities: 2+ peripheral pulses. No edema, cyanosis.    A/P  60F w/ end stage COPD on 3LNC (pending transplant list at [facility]), former smoker, CAD s/p stent (2016), afib on eliquis, uncontrolled DM2, raynaud phenomenon and recent hospitalization at [facility] for altered mental status and AUR[place]A presents to [facility] for evaluation of generalized weakness and difficulty, being treated for COPD exacerbation, requiring BiPAP, with concerns for steroid induced myopathy:     #Constipation, r/o SBO  -Repeat vitals were stable  -F/u URGENT abdominal X-ray to r/o SBO  -Keep NPO for now  -Discussed with RN  -Will continue to monitor  -Will endorse to AM team      Eryn Nieto PA-C  #[id number]        **ADDENDUM @ 23:03**  -Verbal pre-weeks of abdominal x-ray by radiology resident: Fluid filled loops of bowel. No signs of obstruction  -Mineral oil enema and senna 2mg PO to be given STAT  -Discussed with RN  -Will continue to monitor  -Will endorse to AM team      Eryn Nieto PA-C  #[id number] CC: Constipation    HPI: Asked by RN to evaluate patient for constipation. Patient is currently on a bowel regimen: PO miralax 17gm and dulcolax suppository.   Patient seen and examined at the bedside. As per patient, she has not had a bowel movement or passed gas in "days." Patient is otherwise asymptomatic. Denies any abdominal pain, N/V/D. Denies weakness, diaphoresis, headaches, dizziness, syncope, paresthesias, chest pain, palpitations, dyspnea.    Vital Signs Last 24 Hrs  T(C): 36.7 (08 Aug 2020 21:43), Max: 36.9 (08 Aug 2020 17:08)  T(F): 98.1 (08 Aug 2020 21:43), Max: 98.5 (08 Aug 2020 17:08)  HR: 87 (08 Aug 2020 21:43) (75 - 95)  BP: 136/73 (08 Aug 2020 21:43) (120/67 - 136/73)  RR: 20 (08 Aug 2020 21:43) (16 - 20)  SpO2: 100% (08 Aug 2020 21:43) (93% - 100%)    PHYSICAL EXAM:  General: NAD, A&Ox3  Respiratory: Lungs clear to auscultation bilaterally. No wheezes, rales, rhonchi. Normal respiratory effort.   Cardiovascular: S1, S2 present. Regular rate and rhythm. No murmurs, rubs, or gallops  Gastrointestinal: BS x4 normoactive. Soft, non-tender, non-distended.   Extremities: 2+ pitting edema. 3 fluid filled 1cm x 1cm blisters on left foot. 2+ peripheral pulses. No cyanosis.    A/P  60F w/ end stage COPD on 3LNC (pending transplant list at [facility]), former smoker, CAD s/p stent (2016), afib on eliquis, uncontrolled DM2, raynaud phenomenon and recent hospitalization at [facility] for altered mental status and AUR[facility]A presents to [facility] for evaluation of generalized weakness and difficulty, being treated for COPD exacerbation, requiring BiPAP, with concerns for steroid induced myopathy.  Now, patient with constipation despite being on a bowel regimen: PO miralax 17gm and dulcolax suppository. As per patient, she has not had a bowel movement or passed gas in "days." Patient is otherwise asymptomatic. Denies any abdominal pain, N/V/D.    #Constipation, r/o SBO  -Repeat vitals were stable  -F/u URGENT abdominal X-ray to r/o SBO  -Keep NPO for now  -Discussed with RN  -Will continue to monitor  -Will endorse to AM team      Eryn Nieto PA-C  #[id number]        **ADDENDUM @ 23:03**  -Verbal pre-weeks of abdominal x-ray by radiology resident: Fluid filled loops of bowel. No signs of obstruction  -F/u official abdominal x-ray read in AM  -Mineral oil enema and senna 2mg PO to be given STAT  -Resume diet  -Discussed with RN  -Will continue to monitor  -Will endorse to AM team      Eryn Nieto PA-C  #[id number]

## 2020-08-08 NOTE — PROGRESS NOTE ADULT - ASSESSMENT
60F w/ end stage COPD on 3LNC (pending transplant list at [facility]), former smoker, CAD s/p stent (2016), afib on eliquis, uncontrolled DM2, raynaud phenomenon and recent hospitalization at [facility] for altered mental status and AUR[facility]A presents to [facility] for evaluation of generalized weakness and difficulty, being treated for COPD exacerbation, requiring BiPAP, with concerns for steroid induced myopathy:

## 2020-08-08 NOTE — PROGRESS NOTE ADULT - SUBJECTIVE AND OBJECTIVE BOX
CARDIOLOGY FOLLOW UP - Dr. Brunson    CC no cp  unchanged SOB  improved LE edema       PHYSICAL EXAM:  T(C): 36.6 (08-08-20 @ 08:20), Max: 36.8 (08-08-20 @ 06:10)  HR: 76 (08-08-20 @ 08:20) (76 - 101)  BP: 130/79 (08-08-20 @ 08:20) (120/67 - 145/75)  RR: 20 (08-08-20 @ 08:30) (16 - 20)  SpO2: 99% (08-08-20 @ 08:30) (96% - 100%)  Wt(kg): --  I&O's Summary    07 Aug 2020 07:01  -  08 Aug 2020 07:00  --------------------------------------------------------  IN: 440 mL / OUT: 1800 mL / NET: -1360 mL        Appearance: Normal	  Cardiovascular: Normal S1 S2,RRR, No JVD, No murmurs  Respiratory: diminished   Gastrointestinal:  Soft, Non-tender, + BS	  Extremities: Normal range of motion, No clubbing, b/kl +2 le edema         MEDICATIONS  (STANDING):  acetaZOLAMIDE Injectable 250 milliGRAM(s) IV Push <User Schedule>  albuterol/ipratropium for Nebulization 3 milliLiter(s) Nebulizer every 6 hours  ALPRAZolam 0.25 milliGRAM(s) Oral two times a day  apixaban 5 milliGRAM(s) Oral every 12 hours  aspirin enteric coated 81 milliGRAM(s) Oral daily  atorvastatin 80 milliGRAM(s) Oral at bedtime  bisacodyl Suppository 10 milliGRAM(s) Rectal daily  budesonide 160 MICROgram(s)/formoterol 4.5 MICROgram(s) Inhaler 2 Puff(s) Inhalation two times a day  cholecalciferol 2000 Unit(s) Oral daily  dextrose 5%. 1000 milliLiter(s) (50 mL/Hr) IV Continuous <Continuous>  dextrose 50% Injectable 12.5 Gram(s) IV Push once  dextrose 50% Injectable 25 Gram(s) IV Push once  dextrose 50% Injectable 25 Gram(s) IV Push once  diltiazem    milliGRAM(s) Oral daily  insulin glargine Injectable (LANTUS) 10 Unit(s) SubCutaneous at bedtime  insulin lispro (HumaLOG) corrective regimen sliding scale   SubCutaneous three times a day before meals  insulin lispro (HumaLOG) corrective regimen sliding scale   SubCutaneous at bedtime  insulin lispro Injectable (HumaLOG) 3 Unit(s) SubCutaneous three times a day before meals  levoFLOXacin  Tablet 500 milliGRAM(s) Oral every 24 hours  montelukast 10 milliGRAM(s) Oral daily  multivitamin 1 Tablet(s) Oral daily  pantoprazole    Tablet 40 milliGRAM(s) Oral before breakfast  predniSONE   Tablet 40 milliGRAM(s) Oral daily  theophylline ER (24 Hour) 400 milliGRAM(s) Oral daily  tiotropium 18 MICROgram(s) Capsule 1 Capsule(s) Inhalation daily      TELEMETRY: 	    ECG:  	  RADIOLOGY:   DIAGNOSTIC TESTING:  [ ] Echocardiogram:  [ ]  Catheterization:  [ ] Stress Test:    OTHER: 	    LABS:	 	                                9.3    10.64 )-----------( 317      ( 07 Aug 2020 07:56 )             31.3     08-07    139  |  94<L>  |  36<H>  ----------------------------<  167<H>  4.4   |  35<H>  |  1.07    Ca    9.2      07 Aug 2020 07:56  Mg     2.0     08-07

## 2020-08-08 NOTE — PROGRESS NOTE ADULT - PROBLEM SELECTOR PLAN 3
- for now c/w prednisone 40mg d, spiriva, symbicort, ventolin, theophyline  - further management as above  -Dr. Mathew is patient's pulmonologist.

## 2020-08-09 LAB
A1C WITH ESTIMATED AVERAGE GLUCOSE RESULT: 7.5 % — HIGH (ref 4–5.6)
ANION GAP SERPL CALC-SCNC: 10 MMOL/L — SIGNIFICANT CHANGE UP (ref 5–17)
BUN SERPL-MCNC: 32 MG/DL — HIGH (ref 7–23)
CALCIUM SERPL-MCNC: 9.3 MG/DL — SIGNIFICANT CHANGE UP (ref 8.4–10.5)
CHLORIDE SERPL-SCNC: 97 MMOL/L — SIGNIFICANT CHANGE UP (ref 96–108)
CO2 SERPL-SCNC: 33 MMOL/L — HIGH (ref 22–31)
CREAT SERPL-MCNC: 1.19 MG/DL — SIGNIFICANT CHANGE UP (ref 0.5–1.3)
ESTIMATED AVERAGE GLUCOSE: 169 MG/DL — HIGH (ref 68–114)
GLUCOSE BLDC GLUCOMTR-MCNC: 161 MG/DL — HIGH (ref 70–99)
GLUCOSE BLDC GLUCOMTR-MCNC: 257 MG/DL — HIGH (ref 70–99)
GLUCOSE BLDC GLUCOMTR-MCNC: 321 MG/DL — HIGH (ref 70–99)
GLUCOSE BLDC GLUCOMTR-MCNC: 333 MG/DL — HIGH (ref 70–99)
GLUCOSE SERPL-MCNC: 154 MG/DL — HIGH (ref 70–99)
HCT VFR BLD CALC: 29.4 % — LOW (ref 34.5–45)
HGB BLD-MCNC: 8.5 G/DL — LOW (ref 11.5–15.5)
MAGNESIUM SERPL-MCNC: 2 MG/DL — SIGNIFICANT CHANGE UP (ref 1.6–2.6)
MCHC RBC-ENTMCNC: 25.6 PG — LOW (ref 27–34)
MCHC RBC-ENTMCNC: 28.9 GM/DL — LOW (ref 32–36)
MCV RBC AUTO: 88.6 FL — SIGNIFICANT CHANGE UP (ref 80–100)
NRBC # BLD: 0 /100 WBCS — SIGNIFICANT CHANGE UP (ref 0–0)
PLATELET # BLD AUTO: 292 K/UL — SIGNIFICANT CHANGE UP (ref 150–400)
POTASSIUM SERPL-MCNC: 4.3 MMOL/L — SIGNIFICANT CHANGE UP (ref 3.5–5.3)
POTASSIUM SERPL-SCNC: 4.3 MMOL/L — SIGNIFICANT CHANGE UP (ref 3.5–5.3)
RBC # BLD: 3.32 M/UL — LOW (ref 3.8–5.2)
RBC # FLD: 14.9 % — HIGH (ref 10.3–14.5)
SARS-COV-2 RNA SPEC QL NAA+PROBE: SIGNIFICANT CHANGE UP
SODIUM SERPL-SCNC: 140 MMOL/L — SIGNIFICANT CHANGE UP (ref 135–145)
WBC # BLD: 10.8 K/UL — HIGH (ref 3.8–10.5)
WBC # FLD AUTO: 10.8 K/UL — HIGH (ref 3.8–10.5)

## 2020-08-09 PROCEDURE — 99232 SBSQ HOSP IP/OBS MODERATE 35: CPT

## 2020-08-09 RX ORDER — CHLORHEXIDINE GLUCONATE 213 G/1000ML
1 SOLUTION TOPICAL DAILY
Refills: 0 | Status: DISCONTINUED | OUTPATIENT
Start: 2020-08-09 | End: 2020-09-14

## 2020-08-09 RX ORDER — INSULIN LISPRO 100/ML
6 VIAL (ML) SUBCUTANEOUS
Refills: 0 | Status: DISCONTINUED | OUTPATIENT
Start: 2020-08-09 | End: 2020-08-10

## 2020-08-09 RX ORDER — POLYETHYLENE GLYCOL 3350 17 G/17G
17 POWDER, FOR SOLUTION ORAL DAILY
Refills: 0 | Status: DISCONTINUED | OUTPATIENT
Start: 2020-08-09 | End: 2020-09-14

## 2020-08-09 RX ORDER — ACETAZOLAMIDE 250 MG/1
125 TABLET ORAL ONCE
Refills: 0 | Status: COMPLETED | OUTPATIENT
Start: 2020-08-09 | End: 2020-08-09

## 2020-08-09 RX ORDER — INSULIN LISPRO 100/ML
4 VIAL (ML) SUBCUTANEOUS
Refills: 0 | Status: DISCONTINUED | OUTPATIENT
Start: 2020-08-09 | End: 2020-08-09

## 2020-08-09 RX ORDER — INSULIN GLARGINE 100 [IU]/ML
14 INJECTION, SOLUTION SUBCUTANEOUS AT BEDTIME
Refills: 0 | Status: DISCONTINUED | OUTPATIENT
Start: 2020-08-09 | End: 2020-08-10

## 2020-08-09 RX ADMIN — ATORVASTATIN CALCIUM 80 MILLIGRAM(S): 80 TABLET, FILM COATED ORAL at 21:43

## 2020-08-09 RX ADMIN — Medication 180 MILLIGRAM(S): at 06:42

## 2020-08-09 RX ADMIN — INSULIN GLARGINE 14 UNIT(S): 100 INJECTION, SOLUTION SUBCUTANEOUS at 21:43

## 2020-08-09 RX ADMIN — Medication 40 MILLIGRAM(S): at 06:43

## 2020-08-09 RX ADMIN — APIXABAN 5 MILLIGRAM(S): 2.5 TABLET, FILM COATED ORAL at 06:43

## 2020-08-09 RX ADMIN — Medication 4 UNIT(S): at 13:29

## 2020-08-09 RX ADMIN — PANTOPRAZOLE SODIUM 40 MILLIGRAM(S): 20 TABLET, DELAYED RELEASE ORAL at 06:42

## 2020-08-09 RX ADMIN — Medication 0.25 MILLIGRAM(S): at 06:43

## 2020-08-09 RX ADMIN — Medication 4: at 13:28

## 2020-08-09 RX ADMIN — Medication 4 UNIT(S): at 09:21

## 2020-08-09 RX ADMIN — Medication 1 TABLET(S): at 12:28

## 2020-08-09 RX ADMIN — POLYETHYLENE GLYCOL 3350 17 GRAM(S): 17 POWDER, FOR SOLUTION ORAL at 17:50

## 2020-08-09 RX ADMIN — Medication 3 MILLILITER(S): at 17:49

## 2020-08-09 RX ADMIN — Medication 3 MILLILITER(S): at 06:43

## 2020-08-09 RX ADMIN — SENNA PLUS 2 TABLET(S): 8.6 TABLET ORAL at 21:43

## 2020-08-09 RX ADMIN — MONTELUKAST 10 MILLIGRAM(S): 4 TABLET, CHEWABLE ORAL at 12:27

## 2020-08-09 RX ADMIN — APIXABAN 5 MILLIGRAM(S): 2.5 TABLET, FILM COATED ORAL at 17:49

## 2020-08-09 RX ADMIN — Medication 100 MILLIGRAM(S): at 06:48

## 2020-08-09 RX ADMIN — Medication 133 MILLILITER(S): at 22:45

## 2020-08-09 RX ADMIN — Medication 1: at 21:59

## 2020-08-09 RX ADMIN — POLYETHYLENE GLYCOL 3350 17 GRAM(S): 17 POWDER, FOR SOLUTION ORAL at 06:44

## 2020-08-09 RX ADMIN — Medication 3 MILLILITER(S): at 12:28

## 2020-08-09 RX ADMIN — Medication 81 MILLIGRAM(S): at 12:31

## 2020-08-09 RX ADMIN — Medication 5 MILLILITER(S): at 06:42

## 2020-08-09 RX ADMIN — BUDESONIDE AND FORMOTEROL FUMARATE DIHYDRATE 2 PUFF(S): 160; 4.5 AEROSOL RESPIRATORY (INHALATION) at 17:50

## 2020-08-09 RX ADMIN — TIOTROPIUM BROMIDE 1 CAPSULE(S): 18 CAPSULE ORAL; RESPIRATORY (INHALATION) at 12:27

## 2020-08-09 RX ADMIN — Medication 5 MILLILITER(S): at 17:49

## 2020-08-09 RX ADMIN — Medication 400 MILLIGRAM(S): at 12:27

## 2020-08-09 RX ADMIN — Medication 2000 UNIT(S): at 12:28

## 2020-08-09 RX ADMIN — Medication 4 UNIT(S): at 17:49

## 2020-08-09 RX ADMIN — Medication 4: at 17:48

## 2020-08-09 RX ADMIN — BUDESONIDE AND FORMOTEROL FUMARATE DIHYDRATE 2 PUFF(S): 160; 4.5 AEROSOL RESPIRATORY (INHALATION) at 06:44

## 2020-08-09 RX ADMIN — Medication 1: at 09:20

## 2020-08-09 RX ADMIN — ACETAZOLAMIDE 250 MILLIGRAM(S): 250 TABLET ORAL at 11:44

## 2020-08-09 RX ADMIN — ACETAZOLAMIDE 125 MILLIGRAM(S): 250 TABLET ORAL at 18:26

## 2020-08-09 RX ADMIN — CHLORHEXIDINE GLUCONATE 1 APPLICATION(S): 213 SOLUTION TOPICAL at 12:28

## 2020-08-09 NOTE — PROGRESS NOTE ADULT - ATTENDING COMMENTS
Lokesh Stone MD  Division of [facility] Medicine  Cell: [phone]  Pager: [phone]  Office: [phone]

## 2020-08-09 NOTE — PROGRESS NOTE ADULT - PROBLEM SELECTOR PLAN 2
- report of generalized weakness as well as leg weakness. Exam with diminished strength in left hip flexor 3-4/5.  - no urinary/bowel incontinence, no saddle anesthesia, no reported fall/trauma, and reports recent normal CT head this week. Patient REFUSED MR imaging for further evaluation at time of medicine admit despite recommendation.   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 Outpatient lab work this month with TSH,b12,folate, iron wnl.  - Patient currently being tapered off of steroids (received IV steroid at [facility]) now on prednisone 40d. Given end-stage COPD would favor conservative steroid taper.  - per chart review: TTE 2018 and EVAN 2019 demonstrate normal LV/RV systolic function (home lasix is for HTN)  -PT eval  -neurology recommending EMG as an outpatient, and outpt f/u  -not ready to start taper yet as respiratory status is still poor

## 2020-08-09 NOTE — PROGRESS NOTE ADULT - ASSESSMENT
EVAN 1/7/19: no ALINA thrombus, unable to assess LV sys fx  echo 12/7/18: EF 71%, nl LV sys fx, mild diastolic dysfx     a/p    60 year old female with hx of D CHF, HTN, CAD s/p PCI, Afib/ aflutter, s/p Aflutter Ablation 12/2019, COPD, DM2, Anxiety, , raynaud phenomenon presenting with weakness, SOB , hyperkalemia.      1. SOB  -r/t COPD   -pulm f/u   -chest xray unremarkable  -covid 19 negative  -po steroids, neb, bipap   -no acs , cv stable   -ecg with known RBBB, new twi noted, hyperkalemia also noted on admission  -echo with normal LVEF, mild diastolic dysfunction     2. CAD s/p PCI   -no cp, no sob  -c/w ASA, statin  -echo noted above     3. Afib/aflutter s/p  Aflutter Ablation 12/2019  -in NSR, cw cardizem  mg daily  -CHADsVac=2, c/w eliquis     4. Acute on Chronic Diastolic CHF   -+dyspnea secondary to copd;  on bipap prn  -echo with normal lVEF   -lasix dc 8/4 for contraction metabolic alkalosis  -+le edema, diamox IVPB per primary team     dvt ppx

## 2020-08-09 NOTE — PROGRESS NOTE ADULT - PROBLEM SELECTOR PLAN 3
- for now c/w prednisone 40mg d, spiriva, symbicort, ventolin, theophyline  - further management as above  -Dr. Mathew is patient's pulmonologist - informed of admission.

## 2020-08-09 NOTE — PROGRESS NOTE ADULT - ASSESSMENT
60F w/ end stage COPD on 3LNC (pending transplant list at [facility]), former smoker, CAD s/p stent (2016), afib on eliquis, uncontrolled DM2, raynaud phenomenon and recent hospitalization at [facility] for altered mental status and AURORA presents to [facility] for evaluation of generalized weakness and difficulty, being treated for COPD exacerbation, requiring BiPAP, with concerns for steroid induced myopathy:

## 2020-08-09 NOTE — PROGRESS NOTE ADULT - SUBJECTIVE AND OBJECTIVE BOX
Patient is a 60y old  Female who presents with a chief complaint of 60F p/w generalized weakness and difficulty walking (09 Aug 2020 16:03)        SUBJECTIVE / OVERNIGHT EVENTS: Patient reports SOB persists. She has constipation. She reports blisters on her feet.       MEDICATIONS  (STANDING):  acetaZOLAMIDE Injectable 250 milliGRAM(s) IV Push <User Schedule>  albuterol/ipratropium for Nebulization 3 milliLiter(s) Nebulizer every 6 hours  ALPRAZolam 0.25 milliGRAM(s) Oral two times a day  apixaban 5 milliGRAM(s) Oral every 12 hours  aspirin enteric coated 81 milliGRAM(s) Oral daily  atorvastatin 80 milliGRAM(s) Oral at bedtime  Biotene Dry Mouth Oral Rinse 5 milliLiter(s) Swish and Spit two times a day  bisacodyl Suppository 10 milliGRAM(s) Rectal daily  budesonide 160 MICROgram(s)/formoterol 4.5 MICROgram(s) Inhaler 2 Puff(s) Inhalation two times a day  chlorhexidine 2% Cloths 1 Application(s) Topical daily  cholecalciferol 2000 Unit(s) Oral daily  dextrose 5%. 1000 milliLiter(s) (50 mL/Hr) IV Continuous <Continuous>  dextrose 50% Injectable 12.5 Gram(s) IV Push once  dextrose 50% Injectable 25 Gram(s) IV Push once  dextrose 50% Injectable 25 Gram(s) IV Push once  diltiazem    milliGRAM(s) Oral daily  insulin glargine Injectable (LANTUS) 14 Unit(s) SubCutaneous at bedtime  insulin lispro (HumaLOG) corrective regimen sliding scale   SubCutaneous three times a day before meals  insulin lispro (HumaLOG) corrective regimen sliding scale   SubCutaneous at bedtime  insulin lispro Injectable (HumaLOG) 4 Unit(s) SubCutaneous three times a day with meals  levoFLOXacin  Tablet 500 milliGRAM(s) Oral every 24 hours  mineral oil enema 133 milliLiter(s) Rectal once  montelukast 10 milliGRAM(s) Oral daily  multivitamin 1 Tablet(s) Oral daily  pantoprazole    Tablet 40 milliGRAM(s) Oral before breakfast  polyethylene glycol 3350 17 Gram(s) Oral daily  predniSONE   Tablet 40 milliGRAM(s) Oral daily  senna 2 Tablet(s) Oral at bedtime  theophylline ER (24 Hour) 400 milliGRAM(s) Oral daily  tiotropium 18 MICROgram(s) Capsule 1 Capsule(s) Inhalation daily    MEDICATIONS  (PRN):  dextrose 40% Gel 15 Gram(s) Oral once PRN Blood Glucose LESS THAN 70 milliGRAM(s)/deciliter  glucagon  Injectable 1 milliGRAM(s) IntraMuscular once PRN Glucose LESS THAN 70 milligrams/deciliter  guaiFENesin   Syrup  (Sugar-Free) 100 milliGRAM(s) Oral every 6 hours PRN Cough      Vital Signs Last 24 Hrs  T(C): 37 (09 Aug 2020 16:32), Max: 37.1 (09 Aug 2020 09:05)  T(F): 98.6 (09 Aug 2020 16:32), Max: 98.7 (09 Aug 2020 09:05)  HR: 88 (09 Aug 2020 16:32) (67 - 88)  BP: 130/67 (09 Aug 2020 16:32) (106/62 - 130/67)  BP(mean): --  RR: 20 (09 Aug 2020 16:32) (20 - 20)  SpO2: 96% (09 Aug 2020 12:52) (96% - 100%)  CAPILLARY BLOOD GLUCOSE      POCT Blood Glucose.: 333 mg/dL (09 Aug 2020 17:35)  POCT Blood Glucose.: 321 mg/dL (09 Aug 2020 13:24)  POCT Blood Glucose.: 161 mg/dL (09 Aug 2020 09:18)  POCT Blood Glucose.: 269 mg/dL (08 Aug 2020 23:16)    I&O's Summary    08 Aug 2020 07:01  -  09 Aug 2020 07:00  --------------------------------------------------------  IN: 0 mL / OUT: 600 mL / NET: -600 mL    09 Aug 2020 07:01  -  09 Aug 2020 21:55  --------------------------------------------------------  IN: 420 mL / OUT: 1350 mL / NET: -930 mL          PHYSICAL EXAM:   GENERAL: NAD, well-developed  HEAD:  Atraumatic, Normocephalic  EYES: Conjunctiva and sclera clear  NECK: Supple,   CHEST/LUNG: Distant BS. Trace wheeze.   HEART: S1S2 normal. Regular rate and rhythm; No murmurs, rubs, or gallops  ABDOMEN: Soft, Nontender, obese, Nondistended; Bowel sounds present  EXTREMITIES:  2+ LE edema  PSYCH/Neuro: AAOx3. Non-focal.   SKIN: Small blister on top of left foot.       LABS:                        8.5    10.80 )-----------( 292      ( 09 Aug 2020 06:46 )             29.4     08-09    140  |  97  |  32<H>  ----------------------------<  154<H>  4.3   |  33<H>  |  1.19    Ca    9.3      09 Aug 2020 06:45  Mg     2.0     08-09            RADIOLOGY & ADDITIONAL TESTS:    Imaging Personally Reviewed:   Consultant(s) Notes Reviewed:  Cardio  Care Discussed with Consultants/Other Providers:

## 2020-08-09 NOTE — PROGRESS NOTE ADULT - SUBJECTIVE AND OBJECTIVE BOX
CARDIOLOGY FOLLOW UP - Dr. Brunson    CC no cp/  unchanged SOB  c/o blisters       PHYSICAL EXAM:  T(C): 36.8 (08-09-20 @ 06:35), Max: 36.9 (08-08-20 @ 17:08)  HR: 67 (08-09-20 @ 06:35) (67 - 95)  BP: 115/70 (08-09-20 @ 06:35) (106/62 - 136/73)  RR: 20 (08-09-20 @ 06:35) (20 - 20)  SpO2: 100% (08-09-20 @ 06:35) (93% - 100%)  Wt(kg): --  I&O's Summary    08 Aug 2020 07:01  -  09 Aug 2020 07:00  --------------------------------------------------------  IN: 0 mL / OUT: 600 mL / NET: -600 mL    09 Aug 2020 07:01  -  09 Aug 2020 09:48  --------------------------------------------------------  IN: 0 mL / OUT: 700 mL / NET: -700 mL        Appearance: Normal	  Cardiovascular: Normal S1 S2,RRR, No JVD, No murmurs  Respiratory: diminished   Gastrointestinal:  Soft, Non-tender, + BS	  Extremities: Normal range of motion, No clubbing, b/l le edema +2        MEDICATIONS  (STANDING):  acetaZOLAMIDE Injectable 250 milliGRAM(s) IV Push <User Schedule>  albuterol/ipratropium for Nebulization 3 milliLiter(s) Nebulizer every 6 hours  ALPRAZolam 0.25 milliGRAM(s) Oral two times a day  apixaban 5 milliGRAM(s) Oral every 12 hours  aspirin enteric coated 81 milliGRAM(s) Oral daily  atorvastatin 80 milliGRAM(s) Oral at bedtime  Biotene Dry Mouth Oral Rinse 5 milliLiter(s) Swish and Spit two times a day  bisacodyl Suppository 10 milliGRAM(s) Rectal daily  budesonide 160 MICROgram(s)/formoterol 4.5 MICROgram(s) Inhaler 2 Puff(s) Inhalation two times a day  chlorhexidine 2% Cloths 1 Application(s) Topical daily  cholecalciferol 2000 Unit(s) Oral daily  dextrose 5%. 1000 milliLiter(s) (50 mL/Hr) IV Continuous <Continuous>  dextrose 50% Injectable 12.5 Gram(s) IV Push once  dextrose 50% Injectable 25 Gram(s) IV Push once  dextrose 50% Injectable 25 Gram(s) IV Push once  diltiazem    milliGRAM(s) Oral daily  insulin glargine Injectable (LANTUS) 14 Unit(s) SubCutaneous at bedtime  insulin lispro (HumaLOG) corrective regimen sliding scale   SubCutaneous three times a day before meals  insulin lispro (HumaLOG) corrective regimen sliding scale   SubCutaneous at bedtime  insulin lispro Injectable (HumaLOG) 4 Unit(s) SubCutaneous three times a day with meals  levoFLOXacin  Tablet 500 milliGRAM(s) Oral every 24 hours  mineral oil enema 133 milliLiter(s) Rectal once  montelukast 10 milliGRAM(s) Oral daily  multivitamin 1 Tablet(s) Oral daily  pantoprazole    Tablet 40 milliGRAM(s) Oral before breakfast  predniSONE   Tablet 40 milliGRAM(s) Oral daily  senna 2 Tablet(s) Oral at bedtime  theophylline ER (24 Hour) 400 milliGRAM(s) Oral daily  tiotropium 18 MICROgram(s) Capsule 1 Capsule(s) Inhalation daily      TELEMETRY: 	    ECG:  	  RADIOLOGY:   DIAGNOSTIC TESTING:  [ ] Echocardiogram:  [ ]  Catheterization:  [ ] Stress Test:    OTHER: 	    LABS:	 	                                8.5    10.80 )-----------( 292      ( 09 Aug 2020 06:46 )             29.4     08-09    140  |  97  |  32<H>  ----------------------------<  154<H>  4.3   |  33<H>  |  1.19    Ca    9.3      09 Aug 2020 06:45  Mg     2.0     08-09

## 2020-08-09 NOTE — PROGRESS NOTE ADULT - PROBLEM SELECTOR PLAN 8
- holding lasix for elevated CO2, on acetazolamide in place of lasix, Will give additional 125mg of Diamox today for LE edema.   - c/w home diltiazem dosing  - Metolazone has not been given.   -Strict I's/O's and daily weights.

## 2020-08-09 NOTE — PROGRESS NOTE ADULT - PROBLEM SELECTOR PLAN 1
-from COPD exacerbation  -c/w BIPAP at night and PRN  -continue with duonebs, symbicort and spiriva   -continuing steroids for now, will taper once breathing is improved as per pulm  -encouraged to use xanax PRN anxiety  -c/w levaquin 500mg daily as per pulm (may help with exacerbation as per pulm), planning for 7 days.  -Consider adding PCP PPx since on prolonged steroids.

## 2020-08-10 LAB
ANION GAP SERPL CALC-SCNC: 10 MMOL/L — SIGNIFICANT CHANGE UP (ref 5–17)
BUN SERPL-MCNC: 33 MG/DL — HIGH (ref 7–23)
CALCIUM SERPL-MCNC: 9.4 MG/DL — SIGNIFICANT CHANGE UP (ref 8.4–10.5)
CHLORIDE SERPL-SCNC: 97 MMOL/L — SIGNIFICANT CHANGE UP (ref 96–108)
CO2 SERPL-SCNC: 33 MMOL/L — HIGH (ref 22–31)
CREAT SERPL-MCNC: 1.12 MG/DL — SIGNIFICANT CHANGE UP (ref 0.5–1.3)
GLUCOSE BLDC GLUCOMTR-MCNC: 133 MG/DL — HIGH (ref 70–99)
GLUCOSE BLDC GLUCOMTR-MCNC: 171 MG/DL — HIGH (ref 70–99)
GLUCOSE BLDC GLUCOMTR-MCNC: 260 MG/DL — HIGH (ref 70–99)
GLUCOSE BLDC GLUCOMTR-MCNC: 319 MG/DL — HIGH (ref 70–99)
GLUCOSE SERPL-MCNC: 180 MG/DL — HIGH (ref 70–99)
HCT VFR BLD CALC: 30.2 % — LOW (ref 34.5–45)
HGB BLD-MCNC: 8.7 G/DL — LOW (ref 11.5–15.5)
MCHC RBC-ENTMCNC: 25.4 PG — LOW (ref 27–34)
MCHC RBC-ENTMCNC: 28.8 GM/DL — LOW (ref 32–36)
MCV RBC AUTO: 88.3 FL — SIGNIFICANT CHANGE UP (ref 80–100)
NRBC # BLD: 0 /100 WBCS — SIGNIFICANT CHANGE UP (ref 0–0)
PLATELET # BLD AUTO: 288 K/UL — SIGNIFICANT CHANGE UP (ref 150–400)
POTASSIUM SERPL-MCNC: 4.6 MMOL/L — SIGNIFICANT CHANGE UP (ref 3.5–5.3)
POTASSIUM SERPL-SCNC: 4.6 MMOL/L — SIGNIFICANT CHANGE UP (ref 3.5–5.3)
RBC # BLD: 3.42 M/UL — LOW (ref 3.8–5.2)
RBC # FLD: 14.7 % — HIGH (ref 10.3–14.5)
SODIUM SERPL-SCNC: 140 MMOL/L — SIGNIFICANT CHANGE UP (ref 135–145)
WBC # BLD: 12.54 K/UL — HIGH (ref 3.8–10.5)
WBC # FLD AUTO: 12.54 K/UL — HIGH (ref 3.8–10.5)

## 2020-08-10 PROCEDURE — 99233 SBSQ HOSP IP/OBS HIGH 50: CPT

## 2020-08-10 RX ORDER — LACTULOSE 10 G/15ML
20 SOLUTION ORAL ONCE
Refills: 0 | Status: COMPLETED | OUTPATIENT
Start: 2020-08-10 | End: 2020-08-10

## 2020-08-10 RX ORDER — FUROSEMIDE 40 MG
40 TABLET ORAL ONCE
Refills: 0 | Status: COMPLETED | OUTPATIENT
Start: 2020-08-10 | End: 2020-08-10

## 2020-08-10 RX ORDER — INSULIN GLARGINE 100 [IU]/ML
20 INJECTION, SOLUTION SUBCUTANEOUS AT BEDTIME
Refills: 0 | Status: DISCONTINUED | OUTPATIENT
Start: 2020-08-10 | End: 2020-08-13

## 2020-08-10 RX ORDER — LACTULOSE 10 G/15ML
15 SOLUTION ORAL
Refills: 0 | Status: DISCONTINUED | OUTPATIENT
Start: 2020-08-10 | End: 2020-09-14

## 2020-08-10 RX ORDER — INSULIN LISPRO 100/ML
8 VIAL (ML) SUBCUTANEOUS
Refills: 0 | Status: DISCONTINUED | OUTPATIENT
Start: 2020-08-10 | End: 2020-08-11

## 2020-08-10 RX ADMIN — Medication 3 MILLILITER(S): at 13:28

## 2020-08-10 RX ADMIN — Medication 81 MILLIGRAM(S): at 13:29

## 2020-08-10 RX ADMIN — Medication 3 MILLILITER(S): at 23:39

## 2020-08-10 RX ADMIN — Medication 180 MILLIGRAM(S): at 06:09

## 2020-08-10 RX ADMIN — Medication 40 MILLIGRAM(S): at 13:25

## 2020-08-10 RX ADMIN — Medication 5 MILLILITER(S): at 06:09

## 2020-08-10 RX ADMIN — BUDESONIDE AND FORMOTEROL FUMARATE DIHYDRATE 2 PUFF(S): 160; 4.5 AEROSOL RESPIRATORY (INHALATION) at 18:20

## 2020-08-10 RX ADMIN — Medication 3 MILLILITER(S): at 06:10

## 2020-08-10 RX ADMIN — Medication 1: at 08:34

## 2020-08-10 RX ADMIN — Medication 2000 UNIT(S): at 13:27

## 2020-08-10 RX ADMIN — LACTULOSE 15 GRAM(S): 10 SOLUTION ORAL at 18:19

## 2020-08-10 RX ADMIN — Medication 100 MILLIGRAM(S): at 13:28

## 2020-08-10 RX ADMIN — Medication 1 TABLET(S): at 13:29

## 2020-08-10 RX ADMIN — POLYETHYLENE GLYCOL 3350 17 GRAM(S): 17 POWDER, FOR SOLUTION ORAL at 13:29

## 2020-08-10 RX ADMIN — LACTULOSE 20 GRAM(S): 10 SOLUTION ORAL at 02:36

## 2020-08-10 RX ADMIN — Medication 100 MILLIGRAM(S): at 00:26

## 2020-08-10 RX ADMIN — TIOTROPIUM BROMIDE 1 CAPSULE(S): 18 CAPSULE ORAL; RESPIRATORY (INHALATION) at 13:29

## 2020-08-10 RX ADMIN — Medication 3: at 13:12

## 2020-08-10 RX ADMIN — ACETAZOLAMIDE 250 MILLIGRAM(S): 250 TABLET ORAL at 13:24

## 2020-08-10 RX ADMIN — APIXABAN 5 MILLIGRAM(S): 2.5 TABLET, FILM COATED ORAL at 06:10

## 2020-08-10 RX ADMIN — Medication 40 MILLIGRAM(S): at 06:10

## 2020-08-10 RX ADMIN — INSULIN GLARGINE 20 UNIT(S): 100 INJECTION, SOLUTION SUBCUTANEOUS at 22:17

## 2020-08-10 RX ADMIN — BUDESONIDE AND FORMOTEROL FUMARATE DIHYDRATE 2 PUFF(S): 160; 4.5 AEROSOL RESPIRATORY (INHALATION) at 06:10

## 2020-08-10 RX ADMIN — Medication 400 MILLIGRAM(S): at 13:25

## 2020-08-10 RX ADMIN — Medication 100 MILLIGRAM(S): at 22:19

## 2020-08-10 RX ADMIN — APIXABAN 5 MILLIGRAM(S): 2.5 TABLET, FILM COATED ORAL at 18:20

## 2020-08-10 RX ADMIN — PANTOPRAZOLE SODIUM 40 MILLIGRAM(S): 20 TABLET, DELAYED RELEASE ORAL at 06:10

## 2020-08-10 RX ADMIN — Medication 4: at 18:48

## 2020-08-10 RX ADMIN — Medication 8 UNIT(S): at 13:12

## 2020-08-10 RX ADMIN — Medication 8 UNIT(S): at 18:48

## 2020-08-10 RX ADMIN — Medication 3 MILLILITER(S): at 00:13

## 2020-08-10 RX ADMIN — Medication 6 UNIT(S): at 08:34

## 2020-08-10 RX ADMIN — Medication 3 MILLILITER(S): at 18:20

## 2020-08-10 RX ADMIN — MONTELUKAST 10 MILLIGRAM(S): 4 TABLET, CHEWABLE ORAL at 13:25

## 2020-08-10 RX ADMIN — Medication 10 MILLIGRAM(S): at 13:27

## 2020-08-10 RX ADMIN — SENNA PLUS 2 TABLET(S): 8.6 TABLET ORAL at 22:17

## 2020-08-10 RX ADMIN — ATORVASTATIN CALCIUM 80 MILLIGRAM(S): 80 TABLET, FILM COATED ORAL at 22:17

## 2020-08-10 NOTE — PROGRESS NOTE ADULT - ASSESSMENT
EVAN 1/7/19: no ALINA thrombus, unable to assess LV sys fx  echo 12/7/18: EF 71%, nl LV sys fx, mild diastolic dysfx     a/p    60 year old female with hx of D CHF, HTN, CAD s/p PCI, Afib/ aflutter, s/p Aflutter Ablation 12/2019, COPD, DM2, Anxiety, , raynaud phenomenon presenting with weakness, SOB , hyperkalemia.      1. SOB  -r/t COPD   -pulm f/u   -chest xray unremarkable  -covid 19 negative  -po steroids, neb, bipap   -no acs , cv stable   -ecg with known RBBB, new twi noted, hyperkalemia also noted on admission  -echo with normal LVEF, mild diastolic dysfunction     2. CAD s/p PCI   -no cp, no sob  -c/w ASA, statin  -echo noted above     3. Afib/aflutter s/p  Aflutter Ablation 12/2019  -in NSR, cw cardizem  mg daily  -CHADsVac=2, c/w eliquis     4. Acute on Chronic Diastolic CHF   -+dyspnea secondary to copd;  on bipap prn  -echo with normal lVEF   -lasix dc 8/4 for contraction metabolic alkalosis  -+le edema, diamox IVPB per primary team, received IV lasix x 1 today     dvt ppx

## 2020-08-10 NOTE — PROGRESS NOTE ADULT - SUBJECTIVE AND OBJECTIVE BOX
Patient is a 60y old  Female who presents with a chief complaint of 60F p/w generalized weakness and difficulty walking (09 Aug 2020 16:03)    HPI: Pt in bed, on bipap. States she is not getting better. Worried about "water in her legs".    Vital Signs Last 24 Hrs  T(C): 36.6 (10 Aug 2020 06:08), Max: 37 (09 Aug 2020 16:32)  T(F): 97.9 (10 Aug 2020 06:08), Max: 98.6 (09 Aug 2020 16:32)  HR: 88 (10 Aug 2020 06:20) (78 - 89)  BP: 141/91 (10 Aug 2020 06:08) (130/67 - 141/91)  BP(mean): --  RR: 20 (10 Aug 2020 06:08) (18 - 20)  SpO2: 100% (10 Aug 2020 06:20) (95% - 100%)                          8.7    12.54 )-----------( 288      ( 10 Aug 2020 07:16 )             30.2     08-10    140  |  97  |  33<H>  ----------------------------<  180<H>  4.6   |  33<H>  |  1.12    Ca    9.4      10 Aug 2020 07:16  Mg     2.0     08-09    MEDICATIONS  (STANDING):  acetaZOLAMIDE Injectable 250 milliGRAM(s) IV Push <User Schedule>  albuterol/ipratropium for Nebulization 3 milliLiter(s) Nebulizer every 6 hours  ALPRAZolam 0.25 milliGRAM(s) Oral two times a day  apixaban 5 milliGRAM(s) Oral every 12 hours  aspirin enteric coated 81 milliGRAM(s) Oral daily  atorvastatin 80 milliGRAM(s) Oral at bedtime  Biotene Dry Mouth Oral Rinse 5 milliLiter(s) Swish and Spit two times a day  bisacodyl Suppository 10 milliGRAM(s) Rectal daily  budesonide 160 MICROgram(s)/formoterol 4.5 MICROgram(s) Inhaler 2 Puff(s) Inhalation two times a day  chlorhexidine 2% Cloths 1 Application(s) Topical daily  cholecalciferol 2000 Unit(s) Oral daily  dextrose 5%. 1000 milliLiter(s) (50 mL/Hr) IV Continuous <Continuous>  dextrose 50% Injectable 12.5 Gram(s) IV Push once  dextrose 50% Injectable 25 Gram(s) IV Push once  dextrose 50% Injectable 25 Gram(s) IV Push once  diltiazem    milliGRAM(s) Oral daily  insulin glargine Injectable (LANTUS) 14 Unit(s) SubCutaneous at bedtime  insulin lispro (HumaLOG) corrective regimen sliding scale   SubCutaneous three times a day before meals  insulin lispro (HumaLOG) corrective regimen sliding scale   SubCutaneous at bedtime  insulin lispro Injectable (HumaLOG) 6 Unit(s) SubCutaneous three times a day with meals  lactulose Syrup 15 Gram(s) Oral two times a day  levoFLOXacin  Tablet 500 milliGRAM(s) Oral every 24 hours  montelukast 10 milliGRAM(s) Oral daily  multivitamin 1 Tablet(s) Oral daily  pantoprazole    Tablet 40 milliGRAM(s) Oral before breakfast  polyethylene glycol 3350 17 Gram(s) Oral daily  predniSONE   Tablet 40 milliGRAM(s) Oral daily  senna 2 Tablet(s) Oral at bedtime  theophylline ER (24 Hour) 400 milliGRAM(s) Oral daily  tiotropium 18 MICROgram(s) Capsule 1 Capsule(s) Inhalation daily    MEDICATIONS  (PRN):  dextrose 40% Gel 15 Gram(s) Oral once PRN Blood Glucose LESS THAN 70 milliGRAM(s)/deciliter  glucagon  Injectable 1 milliGRAM(s) IntraMuscular once PRN Glucose LESS THAN 70 milligrams/deciliter  guaiFENesin   Syrup  (Sugar-Free) 100 milliGRAM(s) Oral every 6 hours PRN Cough

## 2020-08-10 NOTE — PROGRESS NOTE ADULT - ASSESSMENT
60F w COPD on 3LNC (pending transplant list at [facility]), former smoker, CAD s/p stent (2016), afib on eliquis, uncontrolled DM2, raynaud phenomenon and recent hospitalization at [facility] for altered mental status and [facility] aw COPD exacerbation, LE swelling, weakness.     1. Acute COPD exacerbation- On PO Prednisone, Duoneb, Symbicort, Spiriva and Theophylline. Overnight and prn Bipap. Pt appears comfortable off Bipap and able to speak in full sentences.     Refused to see pulm this am. Second opinion from [facility] pulm to be called.     2. LE edema- Has been on Diamox. One dose of IV Lasix ordered.     3. DM2- A1C 7.5. Steroid induced hyperglycemia. Lantus and premeal insulin increased.     4. Paroxysmal a fib- Now in NSR. On Apixiban, Cardizem.    5. Constipation- Continue bid Lactulose.     Pt belligerent and difficult during interview. Refused to discuss plan, stating that "there's nothing you can do for me. I just want to go home and go back to my transplant doctor" Unwilling to discuss when she can go home.    Monitor.

## 2020-08-10 NOTE — PROGRESS NOTE ADULT - SUBJECTIVE AND OBJECTIVE BOX
CARDIOLOGY FOLLOW UP - Dr. Brunson     CC no cp  unchanged SOB  c/o constipation       PHYSICAL EXAM:  T(C): 36.6 (08-10-20 @ 11:11), Max: 37 (08-09-20 @ 16:32)  HR: 82 (08-10-20 @ 13:49) (78 - 89)  BP: 134/66 (08-10-20 @ 11:11) (130/67 - 141/91)  RR: 18 (08-10-20 @ 11:11) (18 - 20)  SpO2: 97% (08-10-20 @ 13:49) (95% - 100%)  Wt(kg): --  I&O's Summary    09 Aug 2020 07:01  -  10 Aug 2020 07:00  --------------------------------------------------------  IN: 660 mL / OUT: 1350 mL / NET: -690 mL    10 Aug 2020 07:01  -  10 Aug 2020 15:30  --------------------------------------------------------  IN: 380 mL / OUT: 600 mL / NET: -220 mL        Appearance: Normal	  Cardiovascular: Normal S1 S2,RRR, No JVD, No murmurs  Respiratory: diminished   Gastrointestinal:  Soft, Non-tender, + BS	  Extremities: Normal range of motion, No clubbing, ++b/l le edema         MEDICATIONS  (STANDING):  acetaZOLAMIDE Injectable 250 milliGRAM(s) IV Push <User Schedule>  albuterol/ipratropium for Nebulization 3 milliLiter(s) Nebulizer every 6 hours  ALPRAZolam 0.25 milliGRAM(s) Oral two times a day  apixaban 5 milliGRAM(s) Oral every 12 hours  aspirin enteric coated 81 milliGRAM(s) Oral daily  atorvastatin 80 milliGRAM(s) Oral at bedtime  Biotene Dry Mouth Oral Rinse 5 milliLiter(s) Swish and Spit two times a day  bisacodyl Suppository 10 milliGRAM(s) Rectal daily  budesonide 160 MICROgram(s)/formoterol 4.5 MICROgram(s) Inhaler 2 Puff(s) Inhalation two times a day  chlorhexidine 2% Cloths 1 Application(s) Topical daily  cholecalciferol 2000 Unit(s) Oral daily  dextrose 5%. 1000 milliLiter(s) (50 mL/Hr) IV Continuous <Continuous>  dextrose 50% Injectable 12.5 Gram(s) IV Push once  dextrose 50% Injectable 25 Gram(s) IV Push once  dextrose 50% Injectable 25 Gram(s) IV Push once  diltiazem    milliGRAM(s) Oral daily  insulin glargine Injectable (LANTUS) 20 Unit(s) SubCutaneous at bedtime  insulin lispro (HumaLOG) corrective regimen sliding scale   SubCutaneous three times a day before meals  insulin lispro (HumaLOG) corrective regimen sliding scale   SubCutaneous at bedtime  insulin lispro Injectable (HumaLOG) 8 Unit(s) SubCutaneous three times a day with meals  lactulose Syrup 15 Gram(s) Oral two times a day  levoFLOXacin  Tablet 500 milliGRAM(s) Oral every 24 hours  montelukast 10 milliGRAM(s) Oral daily  multivitamin 1 Tablet(s) Oral daily  pantoprazole    Tablet 40 milliGRAM(s) Oral before breakfast  polyethylene glycol 3350 17 Gram(s) Oral daily  predniSONE   Tablet 40 milliGRAM(s) Oral daily  senna 2 Tablet(s) Oral at bedtime  theophylline ER (24 Hour) 400 milliGRAM(s) Oral daily  tiotropium 18 MICROgram(s) Capsule 1 Capsule(s) Inhalation daily      TELEMETRY: 	    ECG:  	  RADIOLOGY:   DIAGNOSTIC TESTING:  [ ] Echocardiogram:  [ ]  Catheterization:  [ ] Stress Test:    OTHER: 	    LABS:	 	                                8.7    12.54 )-----------( 288      ( 10 Aug 2020 07:16 )             30.2     08-10    140  |  97  |  33<H>  ----------------------------<  180<H>  4.6   |  33<H>  |  1.12    Ca    9.4      10 Aug 2020 07:16  Mg     2.0     08-09

## 2020-08-10 NOTE — PROGRESS NOTE ADULT - ATTENDING COMMENTS
Agree with above NP note.  cv stable  cont bipap per pulmonary  diamox/lasix as needed  cont with a/c

## 2020-08-10 NOTE — CHART NOTE - NSCHARTNOTEFT_GEN_A_CORE
I attempted to see patient for followup visit on Monday 8/10/2020 for pulmonary followup. Patient was previously seen by Dr. Carranza.     Patient was angry about her lower extremity edema and did not want to allow me to discuss her case or examine her. She stated that all the physicians at [facility] were incompetent and were trying to kill her. I tried to de-escalate the situation without luck. At that point patient told me that she did not want to listen to my thoughts or recommendations.    I spoke with Dr. Claire, the medical attending, on 8/10/2020 and suggested that a second pulmonary opinion be obtained if patient continued to require pulmonary followup then would suggest speaking to her transplant physicians at [facility] or calling [facility] (Dr. Fair, et al.) who have seen patient in the past    By report patient has severe obstructive lung disease and obstructive sleep apnea requiring BIPAP. Maintain O2 sat > 88% with supplemental O2.  Diuresis as per cardiology - given lower extremity edema  Would suggest discussion of case with her transplant physicians at [facility], if she is amenable, and if she is really close to transplant then she may possibly benefit from transfer to [facility] so they may complete her necessary testing. When I suggested this with patient she did not want me to discuss her case with the [facility] transplant team.    I am unable to provide clinical recommendation in the setting of being unable to evaluate patient. Pulmonary to sign off at this point. If patient changes her mind and is amenable to pulmonary evaluation please call back. Otherwise I would suggest calling a second pulmonary opinion who patient may be more amenable to dealing with.

## 2020-08-11 LAB
ANION GAP SERPL CALC-SCNC: 9 MMOL/L — SIGNIFICANT CHANGE UP (ref 5–17)
BUN SERPL-MCNC: 31 MG/DL — HIGH (ref 7–23)
CALCIUM SERPL-MCNC: 10 MG/DL — SIGNIFICANT CHANGE UP (ref 8.4–10.5)
CHLORIDE SERPL-SCNC: 96 MMOL/L — SIGNIFICANT CHANGE UP (ref 96–108)
CO2 SERPL-SCNC: 34 MMOL/L — HIGH (ref 22–31)
CREAT SERPL-MCNC: 1.15 MG/DL — SIGNIFICANT CHANGE UP (ref 0.5–1.3)
GLUCOSE BLDC GLUCOMTR-MCNC: 132 MG/DL — HIGH (ref 70–99)
GLUCOSE BLDC GLUCOMTR-MCNC: 138 MG/DL — HIGH (ref 70–99)
GLUCOSE BLDC GLUCOMTR-MCNC: 170 MG/DL — HIGH (ref 70–99)
GLUCOSE BLDC GLUCOMTR-MCNC: 275 MG/DL — HIGH (ref 70–99)
GLUCOSE SERPL-MCNC: 139 MG/DL — HIGH (ref 70–99)
HCT VFR BLD CALC: 30.7 % — LOW (ref 34.5–45)
HGB BLD-MCNC: 9.1 G/DL — LOW (ref 11.5–15.5)
MCHC RBC-ENTMCNC: 26.1 PG — LOW (ref 27–34)
MCHC RBC-ENTMCNC: 29.6 GM/DL — LOW (ref 32–36)
MCV RBC AUTO: 88.2 FL — SIGNIFICANT CHANGE UP (ref 80–100)
NRBC # BLD: 0 /100 WBCS — SIGNIFICANT CHANGE UP (ref 0–0)
PLATELET # BLD AUTO: 275 K/UL — SIGNIFICANT CHANGE UP (ref 150–400)
POTASSIUM SERPL-MCNC: 5.4 MMOL/L — HIGH (ref 3.5–5.3)
POTASSIUM SERPL-SCNC: 5.4 MMOL/L — HIGH (ref 3.5–5.3)
RBC # BLD: 3.48 M/UL — LOW (ref 3.8–5.2)
RBC # FLD: 14.9 % — HIGH (ref 10.3–14.5)
SODIUM SERPL-SCNC: 139 MMOL/L — SIGNIFICANT CHANGE UP (ref 135–145)
WBC # BLD: 13.77 K/UL — HIGH (ref 3.8–10.5)
WBC # FLD AUTO: 13.77 K/UL — HIGH (ref 3.8–10.5)

## 2020-08-11 PROCEDURE — 99233 SBSQ HOSP IP/OBS HIGH 50: CPT

## 2020-08-11 RX ORDER — FUROSEMIDE 40 MG
40 TABLET ORAL ONCE
Refills: 0 | Status: COMPLETED | OUTPATIENT
Start: 2020-08-11 | End: 2020-08-11

## 2020-08-11 RX ORDER — INSULIN LISPRO 100/ML
12 VIAL (ML) SUBCUTANEOUS
Refills: 0 | Status: DISCONTINUED | OUTPATIENT
Start: 2020-08-11 | End: 2020-08-14

## 2020-08-11 RX ADMIN — Medication 3 MILLILITER(S): at 14:02

## 2020-08-11 RX ADMIN — Medication 81 MILLIGRAM(S): at 13:59

## 2020-08-11 RX ADMIN — Medication 0.25 MILLIGRAM(S): at 23:05

## 2020-08-11 RX ADMIN — Medication 40 MILLIGRAM(S): at 06:26

## 2020-08-11 RX ADMIN — LACTULOSE 15 GRAM(S): 10 SOLUTION ORAL at 17:10

## 2020-08-11 RX ADMIN — APIXABAN 5 MILLIGRAM(S): 2.5 TABLET, FILM COATED ORAL at 17:10

## 2020-08-11 RX ADMIN — Medication 3 MILLILITER(S): at 06:24

## 2020-08-11 RX ADMIN — Medication 180 MILLIGRAM(S): at 06:26

## 2020-08-11 RX ADMIN — Medication 5 MILLILITER(S): at 06:25

## 2020-08-11 RX ADMIN — Medication 2000 UNIT(S): at 13:59

## 2020-08-11 RX ADMIN — ACETAZOLAMIDE 250 MILLIGRAM(S): 250 TABLET ORAL at 14:07

## 2020-08-11 RX ADMIN — Medication 1: at 18:46

## 2020-08-11 RX ADMIN — Medication 1 TABLET(S): at 14:00

## 2020-08-11 RX ADMIN — CHLORHEXIDINE GLUCONATE 1 APPLICATION(S): 213 SOLUTION TOPICAL at 14:14

## 2020-08-11 RX ADMIN — APIXABAN 5 MILLIGRAM(S): 2.5 TABLET, FILM COATED ORAL at 06:26

## 2020-08-11 RX ADMIN — TIOTROPIUM BROMIDE 1 CAPSULE(S): 18 CAPSULE ORAL; RESPIRATORY (INHALATION) at 14:00

## 2020-08-11 RX ADMIN — Medication 100 MILLIGRAM(S): at 17:14

## 2020-08-11 RX ADMIN — LACTULOSE 15 GRAM(S): 10 SOLUTION ORAL at 06:25

## 2020-08-11 RX ADMIN — BUDESONIDE AND FORMOTEROL FUMARATE DIHYDRATE 2 PUFF(S): 160; 4.5 AEROSOL RESPIRATORY (INHALATION) at 06:25

## 2020-08-11 RX ADMIN — ATORVASTATIN CALCIUM 80 MILLIGRAM(S): 80 TABLET, FILM COATED ORAL at 20:54

## 2020-08-11 RX ADMIN — BUDESONIDE AND FORMOTEROL FUMARATE DIHYDRATE 2 PUFF(S): 160; 4.5 AEROSOL RESPIRATORY (INHALATION) at 17:10

## 2020-08-11 RX ADMIN — PANTOPRAZOLE SODIUM 40 MILLIGRAM(S): 20 TABLET, DELAYED RELEASE ORAL at 06:26

## 2020-08-11 RX ADMIN — Medication 12 UNIT(S): at 18:46

## 2020-08-11 RX ADMIN — Medication 8 UNIT(S): at 09:12

## 2020-08-11 RX ADMIN — Medication 400 MILLIGRAM(S): at 13:59

## 2020-08-11 RX ADMIN — POLYETHYLENE GLYCOL 3350 17 GRAM(S): 17 POWDER, FOR SOLUTION ORAL at 14:01

## 2020-08-11 RX ADMIN — INSULIN GLARGINE 20 UNIT(S): 100 INJECTION, SOLUTION SUBCUTANEOUS at 21:45

## 2020-08-11 RX ADMIN — Medication 8 UNIT(S): at 14:01

## 2020-08-11 RX ADMIN — MONTELUKAST 10 MILLIGRAM(S): 4 TABLET, CHEWABLE ORAL at 14:00

## 2020-08-11 RX ADMIN — Medication 3: at 14:01

## 2020-08-11 RX ADMIN — Medication 5 MILLILITER(S): at 17:10

## 2020-08-11 RX ADMIN — Medication 3 MILLILITER(S): at 19:53

## 2020-08-11 RX ADMIN — Medication 40 MILLIGRAM(S): at 14:00

## 2020-08-11 RX ADMIN — SENNA PLUS 2 TABLET(S): 8.6 TABLET ORAL at 20:54

## 2020-08-11 NOTE — PROGRESS NOTE ADULT - SUBJECTIVE AND OBJECTIVE BOX
Patient is a 60y old  Female who presents with a chief complaint of 60F p/w generalized weakness and difficulty walking (10 Aug 2020 15:30)    HPI: C/o dyspnea and swelling in legs. Rude and belligerent during interview.    Vital Signs Last 24 Hrs  T(C): 36.6 (11 Aug 2020 11:01), Max: 36.9 (10 Aug 2020 15:36)  T(F): 97.9 (11 Aug 2020 11:01), Max: 98.4 (10 Aug 2020 15:36)  HR: 89 (11 Aug 2020 13:44) (84 - 93)  BP: 142/76 (11 Aug 2020 11:01) (133/73 - 144/79)  BP(mean): --  RR: 18 (11 Aug 2020 11:01) (16 - 18)  SpO2: 98% (11 Aug 2020 13:44) (96% - 100%)                          9.1    13.77 )-----------( 275      ( 11 Aug 2020 08:00 )             30.7     08-11    139  |  96  |  31<H>  ----------------------------<  139<H>  5.4<H>   |  34<H>  |  1.15    Ca    10.0      11 Aug 2020 08:00      MEDICATIONS  (STANDING):  acetaZOLAMIDE Injectable 250 milliGRAM(s) IV Push <User Schedule>  albuterol/ipratropium for Nebulization 3 milliLiter(s) Nebulizer every 6 hours  ALPRAZolam 0.25 milliGRAM(s) Oral two times a day  apixaban 5 milliGRAM(s) Oral every 12 hours  aspirin enteric coated 81 milliGRAM(s) Oral daily  atorvastatin 80 milliGRAM(s) Oral at bedtime  Biotene Dry Mouth Oral Rinse 5 milliLiter(s) Swish and Spit two times a day  bisacodyl Suppository 10 milliGRAM(s) Rectal daily  budesonide 160 MICROgram(s)/formoterol 4.5 MICROgram(s) Inhaler 2 Puff(s) Inhalation two times a day  chlorhexidine 2% Cloths 1 Application(s) Topical daily  cholecalciferol 2000 Unit(s) Oral daily  dextrose 5%. 1000 milliLiter(s) (50 mL/Hr) IV Continuous <Continuous>  dextrose 50% Injectable 12.5 Gram(s) IV Push once  dextrose 50% Injectable 25 Gram(s) IV Push once  dextrose 50% Injectable 25 Gram(s) IV Push once  diltiazem    milliGRAM(s) Oral daily  insulin glargine Injectable (LANTUS) 20 Unit(s) SubCutaneous at bedtime  insulin lispro (HumaLOG) corrective regimen sliding scale   SubCutaneous three times a day before meals  insulin lispro (HumaLOG) corrective regimen sliding scale   SubCutaneous at bedtime  insulin lispro Injectable (HumaLOG) 8 Unit(s) SubCutaneous three times a day with meals  lactulose Syrup 15 Gram(s) Oral two times a day  levoFLOXacin  Tablet 500 milliGRAM(s) Oral every 24 hours  montelukast 10 milliGRAM(s) Oral daily  multivitamin 1 Tablet(s) Oral daily  pantoprazole    Tablet 40 milliGRAM(s) Oral before breakfast  polyethylene glycol 3350 17 Gram(s) Oral daily  predniSONE   Tablet 40 milliGRAM(s) Oral daily  senna 2 Tablet(s) Oral at bedtime  sorbitol 70%/mineral oil/magnesium hydroxide/glycerin Enema 120 milliLiter(s) Rectal once  theophylline ER (24 Hour) 400 milliGRAM(s) Oral daily  tiotropium 18 MICROgram(s) Capsule 1 Capsule(s) Inhalation daily    MEDICATIONS  (PRN):  dextrose 40% Gel 15 Gram(s) Oral once PRN Blood Glucose LESS THAN 70 milliGRAM(s)/deciliter  glucagon  Injectable 1 milliGRAM(s) IntraMuscular once PRN Glucose LESS THAN 70 milligrams/deciliter  guaiFENesin   Syrup  (Sugar-Free) 100 milliGRAM(s) Oral every 6 hours PRN Cough

## 2020-08-11 NOTE — PROGRESS NOTE ADULT - ASSESSMENT
EVAN 1/7/19: no ALINA thrombus, unable to assess LV sys fx  echo 12/7/18: EF 71%, nl LV sys fx, mild diastolic dysfx     a/p    60 year old female with hx of D CHF, HTN, CAD s/p PCI, Afib/ aflutter, s/p Aflutter Ablation 12/2019, COPD, DM2, Anxiety, , raynaud phenomenon presenting with weakness, SOB , hyperkalemia.      1. SOB  -r/t COPD   -pulm f/u   -chest xray unremarkable  -covid 19 negative  -po steroids, neb, bipap   -no acs , cv stable   -ecg with known RBBB, new twi noted, hyperkalemia also noted on admission  -echo with normal LVEF, mild diastolic dysfunction     2. CAD s/p PCI   -no cp, no sob  -c/w ASA, statin  -echo noted above     3. Afib/aflutter s/p  Aflutter Ablation 12/2019  -in NSR, cw cardizem  mg daily  -CHADsVac=2, c/w eliquis     4. Acute on Chronic Diastolic CHF   -+dyspnea secondary to copd;  on bipap prn  -echo with normal lVEF   -lasix dc 8/4 for contraction metabolic alkalosis  -+le edema, diamox IVPB/lasix per primary team     dvt ppx

## 2020-08-11 NOTE — PROGRESS NOTE ADULT - ASSESSMENT
60F w COPD on 3LNC (? pending transplant list at [facility]), former smoker, CAD s/p stent (2016), afib on eliquis, uncontrolled DM2, raynaud phenomenon and recent hospitalization at [facility] for altered mental status and [facility] aw COPD exacerbation, LE swelling, weakness.     1. Acute COPD exacerbation- On PO Prednisone, Duoneb, Symbicort, Spiriva and Theophylline. Overnight and prn Bipap. Pt  comfortable off Bipap and able to speak in full sentences.     Pt belligerent and uncooperative during hospitalization, has refused to see many doctors including 2 pulmonary teams.     2. LE edema- Has been on Diamox with poor response. Reorder Lasix.     3. DM2- A1C 7.5. Steroid induced hyperglycemia. Lantus and premeal insulin increased.     4. Paroxysmal a fib- Now in NSR. On Apixiban, Cardizem.    5. Constipation- SMOG enema today.     6. Hyperkalemia- monitor after Lasix.

## 2020-08-11 NOTE — PROGRESS NOTE ADULT - SUBJECTIVE AND OBJECTIVE BOX
CARDIOLOGY FOLLOW UP - Dr. Brunson    CC no cp  SOB unchanged   pt. frustrated that her LE edema is not getting better       PHYSICAL EXAM:  T(C): 36.6 (08-11-20 @ 11:01), Max: 36.9 (08-10-20 @ 15:36)  HR: 89 (08-11-20 @ 13:44) (84 - 93)  BP: 142/76 (08-11-20 @ 11:01) (133/73 - 144/79)  RR: 18 (08-11-20 @ 11:01) (16 - 18)  SpO2: 98% (08-11-20 @ 13:44) (96% - 100%)  Wt(kg): --  I&O's Summary    10 Aug 2020 07:01  -  11 Aug 2020 07:00  --------------------------------------------------------  IN: 700 mL / OUT: 1800 mL / NET: -1100 mL    11 Aug 2020 07:01  -  11 Aug 2020 15:23  --------------------------------------------------------  IN: 300 mL / OUT: 500 mL / NET: -200 mL        Appearance: Normal	  Cardiovascular: Normal S1 S2,RRR, No JVD, No murmurs  Respiratory: diminished   Gastrointestinal:  Soft, Non-tender, + BS	  Extremities: Normal range of motion, No clubbing, b/l ++ ble edema, +blisters on feet      MEDICATIONS  (STANDING):  acetaZOLAMIDE Injectable 250 milliGRAM(s) IV Push <User Schedule>  albuterol/ipratropium for Nebulization 3 milliLiter(s) Nebulizer every 6 hours  ALPRAZolam 0.25 milliGRAM(s) Oral two times a day  apixaban 5 milliGRAM(s) Oral every 12 hours  aspirin enteric coated 81 milliGRAM(s) Oral daily  atorvastatin 80 milliGRAM(s) Oral at bedtime  Biotene Dry Mouth Oral Rinse 5 milliLiter(s) Swish and Spit two times a day  bisacodyl Suppository 10 milliGRAM(s) Rectal daily  budesonide 160 MICROgram(s)/formoterol 4.5 MICROgram(s) Inhaler 2 Puff(s) Inhalation two times a day  chlorhexidine 2% Cloths 1 Application(s) Topical daily  cholecalciferol 2000 Unit(s) Oral daily  dextrose 5%. 1000 milliLiter(s) (50 mL/Hr) IV Continuous <Continuous>  dextrose 50% Injectable 12.5 Gram(s) IV Push once  dextrose 50% Injectable 25 Gram(s) IV Push once  dextrose 50% Injectable 25 Gram(s) IV Push once  diltiazem    milliGRAM(s) Oral daily  insulin glargine Injectable (LANTUS) 20 Unit(s) SubCutaneous at bedtime  insulin lispro (HumaLOG) corrective regimen sliding scale   SubCutaneous three times a day before meals  insulin lispro (HumaLOG) corrective regimen sliding scale   SubCutaneous at bedtime  insulin lispro Injectable (HumaLOG) 8 Unit(s) SubCutaneous three times a day with meals  lactulose Syrup 15 Gram(s) Oral two times a day  levoFLOXacin  Tablet 500 milliGRAM(s) Oral every 24 hours  montelukast 10 milliGRAM(s) Oral daily  multivitamin 1 Tablet(s) Oral daily  pantoprazole    Tablet 40 milliGRAM(s) Oral before breakfast  polyethylene glycol 3350 17 Gram(s) Oral daily  predniSONE   Tablet 40 milliGRAM(s) Oral daily  senna 2 Tablet(s) Oral at bedtime  sorbitol 70%/mineral oil/magnesium hydroxide/glycerin Enema 120 milliLiter(s) Rectal once  theophylline ER (24 Hour) 400 milliGRAM(s) Oral daily  tiotropium 18 MICROgram(s) Capsule 1 Capsule(s) Inhalation daily      TELEMETRY: 	    ECG:  	  RADIOLOGY:   DIAGNOSTIC TESTING:  [ ] Echocardiogram:  [ ]  Catheterization:  [ ] Stress Test:    OTHER: 	    LABS:	 	                                9.1    13.77 )-----------( 275      ( 11 Aug 2020 08:00 )             30.7     08-11    139  |  96  |  31<H>  ----------------------------<  139<H>  5.4<H>   |  34<H>  |  1.15    Ca    10.0      11 Aug 2020 08:00

## 2020-08-12 ENCOUNTER — APPOINTMENT (OUTPATIENT)
Dept: INTERNAL MEDICINE | Facility: CLINIC | Age: 60
End: 2020-08-12

## 2020-08-12 LAB
ANION GAP SERPL CALC-SCNC: 14 MMOL/L — SIGNIFICANT CHANGE UP (ref 5–17)
BUN SERPL-MCNC: 34 MG/DL — HIGH (ref 7–23)
CALCIUM SERPL-MCNC: 9.6 MG/DL — SIGNIFICANT CHANGE UP (ref 8.4–10.5)
CHLORIDE SERPL-SCNC: 93 MMOL/L — LOW (ref 96–108)
CO2 SERPL-SCNC: 32 MMOL/L — HIGH (ref 22–31)
CREAT SERPL-MCNC: 1.15 MG/DL — SIGNIFICANT CHANGE UP (ref 0.5–1.3)
GLUCOSE BLDC GLUCOMTR-MCNC: 100 MG/DL — HIGH (ref 70–99)
GLUCOSE BLDC GLUCOMTR-MCNC: 161 MG/DL — HIGH (ref 70–99)
GLUCOSE BLDC GLUCOMTR-MCNC: 162 MG/DL — HIGH (ref 70–99)
GLUCOSE BLDC GLUCOMTR-MCNC: 325 MG/DL — HIGH (ref 70–99)
GLUCOSE SERPL-MCNC: 109 MG/DL — HIGH (ref 70–99)
HCT VFR BLD CALC: 29.3 % — LOW (ref 34.5–45)
HGB BLD-MCNC: 8.7 G/DL — LOW (ref 11.5–15.5)
MCHC RBC-ENTMCNC: 26 PG — LOW (ref 27–34)
MCHC RBC-ENTMCNC: 29.7 GM/DL — LOW (ref 32–36)
MCV RBC AUTO: 87.5 FL — SIGNIFICANT CHANGE UP (ref 80–100)
NRBC # BLD: 0 /100 WBCS — SIGNIFICANT CHANGE UP (ref 0–0)
PLATELET # BLD AUTO: 256 K/UL — SIGNIFICANT CHANGE UP (ref 150–400)
POTASSIUM SERPL-MCNC: 4.2 MMOL/L — SIGNIFICANT CHANGE UP (ref 3.5–5.3)
POTASSIUM SERPL-SCNC: 4.2 MMOL/L — SIGNIFICANT CHANGE UP (ref 3.5–5.3)
RBC # BLD: 3.35 M/UL — LOW (ref 3.8–5.2)
RBC # FLD: 14.8 % — HIGH (ref 10.3–14.5)
SODIUM SERPL-SCNC: 139 MMOL/L — SIGNIFICANT CHANGE UP (ref 135–145)
WBC # BLD: 12.02 K/UL — HIGH (ref 3.8–10.5)
WBC # FLD AUTO: 12.02 K/UL — HIGH (ref 3.8–10.5)

## 2020-08-12 PROCEDURE — 99232 SBSQ HOSP IP/OBS MODERATE 35: CPT

## 2020-08-12 RX ORDER — FUROSEMIDE 40 MG
40 TABLET ORAL DAILY
Refills: 0 | Status: DISCONTINUED | OUTPATIENT
Start: 2020-08-13 | End: 2020-08-13

## 2020-08-12 RX ORDER — ALPRAZOLAM 0.25 MG
0.25 TABLET ORAL
Refills: 0 | Status: DISCONTINUED | OUTPATIENT
Start: 2020-08-12 | End: 2020-08-19

## 2020-08-12 RX ORDER — FUROSEMIDE 40 MG
40 TABLET ORAL ONCE
Refills: 0 | Status: COMPLETED | OUTPATIENT
Start: 2020-08-12 | End: 2020-08-12

## 2020-08-12 RX ADMIN — Medication 3 MILLILITER(S): at 22:45

## 2020-08-12 RX ADMIN — POLYETHYLENE GLYCOL 3350 17 GRAM(S): 17 POWDER, FOR SOLUTION ORAL at 12:40

## 2020-08-12 RX ADMIN — Medication 180 MILLIGRAM(S): at 05:24

## 2020-08-12 RX ADMIN — Medication 40 MILLIGRAM(S): at 05:24

## 2020-08-12 RX ADMIN — Medication 2000 UNIT(S): at 12:40

## 2020-08-12 RX ADMIN — Medication 40 MILLIGRAM(S): at 14:18

## 2020-08-12 RX ADMIN — Medication 81 MILLIGRAM(S): at 12:39

## 2020-08-12 RX ADMIN — LACTULOSE 15 GRAM(S): 10 SOLUTION ORAL at 18:19

## 2020-08-12 RX ADMIN — Medication 5 MILLILITER(S): at 18:18

## 2020-08-12 RX ADMIN — Medication 1: at 09:00

## 2020-08-12 RX ADMIN — INSULIN GLARGINE 20 UNIT(S): 100 INJECTION, SOLUTION SUBCUTANEOUS at 22:42

## 2020-08-12 RX ADMIN — MONTELUKAST 10 MILLIGRAM(S): 4 TABLET, CHEWABLE ORAL at 12:40

## 2020-08-12 RX ADMIN — Medication 12 UNIT(S): at 14:19

## 2020-08-12 RX ADMIN — CHLORHEXIDINE GLUCONATE 1 APPLICATION(S): 213 SOLUTION TOPICAL at 10:00

## 2020-08-12 RX ADMIN — Medication 3 MILLILITER(S): at 12:38

## 2020-08-12 RX ADMIN — Medication 12 UNIT(S): at 09:00

## 2020-08-12 RX ADMIN — Medication 5 MILLILITER(S): at 05:23

## 2020-08-12 RX ADMIN — LACTULOSE 15 GRAM(S): 10 SOLUTION ORAL at 05:24

## 2020-08-12 RX ADMIN — Medication 10 MILLIGRAM(S): at 22:38

## 2020-08-12 RX ADMIN — ACETAZOLAMIDE 250 MILLIGRAM(S): 250 TABLET ORAL at 12:38

## 2020-08-12 RX ADMIN — Medication 3 MILLILITER(S): at 05:24

## 2020-08-12 RX ADMIN — Medication 3 MILLILITER(S): at 18:21

## 2020-08-12 RX ADMIN — ATORVASTATIN CALCIUM 80 MILLIGRAM(S): 80 TABLET, FILM COATED ORAL at 22:38

## 2020-08-12 RX ADMIN — APIXABAN 5 MILLIGRAM(S): 2.5 TABLET, FILM COATED ORAL at 05:24

## 2020-08-12 RX ADMIN — Medication 1 TABLET(S): at 12:40

## 2020-08-12 RX ADMIN — APIXABAN 5 MILLIGRAM(S): 2.5 TABLET, FILM COATED ORAL at 18:19

## 2020-08-12 RX ADMIN — SENNA PLUS 2 TABLET(S): 8.6 TABLET ORAL at 22:38

## 2020-08-12 RX ADMIN — Medication 400 MILLIGRAM(S): at 12:36

## 2020-08-12 RX ADMIN — TIOTROPIUM BROMIDE 1 CAPSULE(S): 18 CAPSULE ORAL; RESPIRATORY (INHALATION) at 12:36

## 2020-08-12 RX ADMIN — Medication 2: at 22:48

## 2020-08-12 RX ADMIN — BUDESONIDE AND FORMOTEROL FUMARATE DIHYDRATE 2 PUFF(S): 160; 4.5 AEROSOL RESPIRATORY (INHALATION) at 18:19

## 2020-08-12 RX ADMIN — Medication 100 MILLIGRAM(S): at 22:53

## 2020-08-12 RX ADMIN — Medication 0.25 MILLIGRAM(S): at 22:42

## 2020-08-12 RX ADMIN — PANTOPRAZOLE SODIUM 40 MILLIGRAM(S): 20 TABLET, DELAYED RELEASE ORAL at 05:26

## 2020-08-12 RX ADMIN — BUDESONIDE AND FORMOTEROL FUMARATE DIHYDRATE 2 PUFF(S): 160; 4.5 AEROSOL RESPIRATORY (INHALATION) at 05:24

## 2020-08-12 NOTE — PROGRESS NOTE ADULT - SUBJECTIVE AND OBJECTIVE BOX
CC: c/o leg/pedal edema and constipation    TELEMETRY:     PHYSICAL EXAM:    T(C): 36.7 (08-12-20 @ 09:15), Max: 36.7 (08-12-20 @ 09:15)  HR: 82 (08-12-20 @ 10:12) (80 - 96)  BP: 137/71 (08-12-20 @ 09:15) (125/80 - 139/76)  RR: 18 (08-12-20 @ 09:15) (16 - 18)  SpO2: 97% (08-12-20 @ 10:12) (96% - 100%)  Wt(kg): --  I&O's Summary    11 Aug 2020 07:01  -  12 Aug 2020 07:00  --------------------------------------------------------  IN: 540 mL / OUT: 2000 mL / NET: -1460 mL    12 Aug 2020 07:01  -  12 Aug 2020 12:07  --------------------------------------------------------  IN: 260 mL / OUT: 0 mL / NET: 260 mL        Appearance: Normal	  Cardiovascular: Normal S1 S2,RRR, No JVD, No murmurs  Respiratory: Lungs clear to auscultation	  Gastrointestinal:  Soft, Non-tender, + BS	  Extremities: Normal range of motion, No clubbing, cyanosis or edema  Vascular: Peripheral pulses palpable 2+ bilaterally     LABS:	 	                          8.7    12.02 )-----------( 256      ( 12 Aug 2020 07:24 )             29.3     08-12    139  |  93<L>  |  34<H>  ----------------------------<  109<H>  4.2   |  32<H>  |  1.15    Ca    9.6      12 Aug 2020 07:24            CARDIAC MARKERS:

## 2020-08-12 NOTE — PROGRESS NOTE ADULT - ASSESSMENT
60F w COPD on 3LNC (? pending transplant list at [facility]), former smoker, CAD s/p stent (2016), afib on eliquis, uncontrolled DM2, raynaud phenomenon and recent hospitalization at [facility] for altered mental status and [facility] aw COPD exacerbation, LE swelling, weakness.     1. Acute COPD exacerbation- On PO Prednisone, Duoneb, Symbicort, Spiriva and Theophylline. Overnight and prn Bipap. Pt  comfortable off Bipap and able to speak in full sentences.     Pt belligerent and uncooperative during hospitalization, has refused to see many doctors including 2 pulmonary teams. Refused ABG yesterday.     Continue po steroids, duoneb.    2. LE edema, likely acute diastolic CHF- Has been on Diamox with poor response. Restarted on IV Lasix, continue.    3. DM2- A1C 7.5. Steroid induced hyperglycemia. Continue Lantus and premeal insulin.    4. Paroxysmal a fib- Now in NSR. On Apixiban, Cardizem.    5. Constipation- SMOG enema given w good response.     6. Hyperkalemia- Resolved.

## 2020-08-12 NOTE — PROGRESS NOTE ADULT - ASSESSMENT
EVAN 1/7/19: no ALINA thrombus, unable to assess LV sys fx  echo 12/7/18: EF 71%, nl LV sys fx, mild diastolic dysfx     a/p    60 year old female with hx of D CHF, HTN, CAD s/p PCI, Afib/ aflutter, s/p Aflutter Ablation 12/2019, COPD, DM2, Anxiety, , raynaud phenomenon presenting with weakness, SOB , hyperkalemia.      1. SOB  -r/t COPD   -pulm f/u   -chest xray unremarkable  -covid 19 negative  -po steroids, neb, bipap   -no acs , cv stable   -ecg with known RBBB, new twi noted, hyperkalemia also noted on admission  -echo with normal LVEF, mild diastolic dysfunction     2. CAD s/p PCI   -no cp, no sob  -c/w ASA, statin  -echo noted above     3. Afib/aflutter s/p  Aflutter Ablation 12/2019  -in NSR, cw cardizem  mg daily  -CHADsVac=2, c/w eliquis     4. Acute on Chronic Diastolic CHF   -+dyspnea secondary to copd;  on bipap prn  -echo with normal lVEF   -will continue lasix prn, 40mg IVP today  -continue diamox    dvt ppx

## 2020-08-12 NOTE — PROGRESS NOTE ADULT - SUBJECTIVE AND OBJECTIVE BOX
Patient is a 60y old  Female who presents with a chief complaint of 60F p/w generalized weakness and difficulty walking (12 Aug 2020 12:06)    HPI: Pt sitting up in bed, c/o dyspnea. Appears comfortable, able to speak in full sentences.     Vital Signs Last 24 Hrs  T(C): 36.7 (12 Aug 2020 09:15), Max: 36.7 (12 Aug 2020 09:15)  T(F): 98.1 (12 Aug 2020 09:15), Max: 98.1 (12 Aug 2020 09:15)  HR: 82 (12 Aug 2020 10:12) (80 - 96)  BP: 137/71 (12 Aug 2020 09:15) (125/80 - 139/76)  BP(mean): --  RR: 18 (12 Aug 2020 09:15) (16 - 18)  SpO2: 97% (12 Aug 2020 10:12) (96% - 100%)                            8.7    12.02 )-----------( 256      ( 12 Aug 2020 07:24 )             29.3     08-12    139  |  93<L>  |  34<H>  ----------------------------<  109<H>  4.2   |  32<H>  |  1.15    Ca    9.6      12 Aug 2020 07:24    MEDICATIONS  (STANDING):  acetaZOLAMIDE Injectable 250 milliGRAM(s) IV Push <User Schedule>  albuterol/ipratropium for Nebulization 3 milliLiter(s) Nebulizer every 6 hours  apixaban 5 milliGRAM(s) Oral every 12 hours  aspirin enteric coated 81 milliGRAM(s) Oral daily  atorvastatin 80 milliGRAM(s) Oral at bedtime  Biotene Dry Mouth Oral Rinse 5 milliLiter(s) Swish and Spit two times a day  bisacodyl Suppository 10 milliGRAM(s) Rectal daily  budesonide 160 MICROgram(s)/formoterol 4.5 MICROgram(s) Inhaler 2 Puff(s) Inhalation two times a day  chlorhexidine 2% Cloths 1 Application(s) Topical daily  cholecalciferol 2000 Unit(s) Oral daily  dextrose 5%. 1000 milliLiter(s) (50 mL/Hr) IV Continuous <Continuous>  dextrose 50% Injectable 25 Gram(s) IV Push once  diltiazem    milliGRAM(s) Oral daily  furosemide   Injectable 40 milliGRAM(s) IV Push once  insulin glargine Injectable (LANTUS) 20 Unit(s) SubCutaneous at bedtime  insulin lispro (HumaLOG) corrective regimen sliding scale   SubCutaneous three times a day before meals  insulin lispro (HumaLOG) corrective regimen sliding scale   SubCutaneous at bedtime  insulin lispro Injectable (HumaLOG) 12 Unit(s) SubCutaneous three times a day with meals  lactulose Syrup 15 Gram(s) Oral two times a day  levoFLOXacin  Tablet 500 milliGRAM(s) Oral every 24 hours  montelukast 10 milliGRAM(s) Oral daily  multivitamin 1 Tablet(s) Oral daily  pantoprazole    Tablet 40 milliGRAM(s) Oral before breakfast  polyethylene glycol 3350 17 Gram(s) Oral daily  predniSONE   Tablet 40 milliGRAM(s) Oral daily  senna 2 Tablet(s) Oral at bedtime  theophylline ER (24 Hour) 400 milliGRAM(s) Oral daily  tiotropium 18 MICROgram(s) Capsule 1 Capsule(s) Inhalation daily    MEDICATIONS  (PRN):  ALPRAZolam 0.25 milliGRAM(s) Oral two times a day PRN Anxiety  glucagon  Injectable 1 milliGRAM(s) IntraMuscular once PRN Glucose LESS THAN 70 milligrams/deciliter  guaiFENesin   Syrup  (Sugar-Free) 100 milliGRAM(s) Oral every 6 hours PRN Cough

## 2020-08-13 LAB
ANION GAP SERPL CALC-SCNC: 8 MMOL/L — SIGNIFICANT CHANGE UP (ref 5–17)
BASE EXCESS BLDA CALC-SCNC: 10.1 MMOL/L — HIGH (ref -2–2)
BUN SERPL-MCNC: 39 MG/DL — HIGH (ref 7–23)
CALCIUM SERPL-MCNC: 9.1 MG/DL — SIGNIFICANT CHANGE UP (ref 8.4–10.5)
CHLORIDE SERPL-SCNC: 94 MMOL/L — LOW (ref 96–108)
CO2 BLDA-SCNC: 39 MMOL/L — HIGH (ref 22–30)
CO2 SERPL-SCNC: 36 MMOL/L — HIGH (ref 22–31)
CREAT SERPL-MCNC: 1.32 MG/DL — HIGH (ref 0.5–1.3)
GLUCOSE BLDC GLUCOMTR-MCNC: 117 MG/DL — HIGH (ref 70–99)
GLUCOSE BLDC GLUCOMTR-MCNC: 145 MG/DL — HIGH (ref 70–99)
GLUCOSE BLDC GLUCOMTR-MCNC: 181 MG/DL — HIGH (ref 70–99)
GLUCOSE BLDC GLUCOMTR-MCNC: 210 MG/DL — HIGH (ref 70–99)
GLUCOSE SERPL-MCNC: 189 MG/DL — HIGH (ref 70–99)
HCO3 BLDA-SCNC: 37 MMOL/L — HIGH (ref 21–29)
HCT VFR BLD CALC: 28.5 % — LOW (ref 34.5–45)
HGB BLD-MCNC: 8.5 G/DL — LOW (ref 11.5–15.5)
MCHC RBC-ENTMCNC: 25.8 PG — LOW (ref 27–34)
MCHC RBC-ENTMCNC: 29.8 GM/DL — LOW (ref 32–36)
MCV RBC AUTO: 86.6 FL — SIGNIFICANT CHANGE UP (ref 80–100)
NRBC # BLD: 0 /100 WBCS — SIGNIFICANT CHANGE UP (ref 0–0)
PCO2 BLDA: 68 MMHG — HIGH (ref 32–46)
PH BLDA: 7.35 — SIGNIFICANT CHANGE UP (ref 7.35–7.45)
PLATELET # BLD AUTO: 228 K/UL — SIGNIFICANT CHANGE UP (ref 150–400)
PO2 BLDA: 161 MMHG — HIGH (ref 74–108)
POTASSIUM SERPL-MCNC: 4.7 MMOL/L — SIGNIFICANT CHANGE UP (ref 3.5–5.3)
POTASSIUM SERPL-SCNC: 4.7 MMOL/L — SIGNIFICANT CHANGE UP (ref 3.5–5.3)
RBC # BLD: 3.29 M/UL — LOW (ref 3.8–5.2)
RBC # FLD: 14.8 % — HIGH (ref 10.3–14.5)
SAO2 % BLDA: 99 % — HIGH (ref 92–96)
SODIUM SERPL-SCNC: 138 MMOL/L — SIGNIFICANT CHANGE UP (ref 135–145)
WBC # BLD: 11.25 K/UL — HIGH (ref 3.8–10.5)
WBC # FLD AUTO: 11.25 K/UL — HIGH (ref 3.8–10.5)

## 2020-08-13 PROCEDURE — 99232 SBSQ HOSP IP/OBS MODERATE 35: CPT

## 2020-08-13 RX ORDER — INSULIN GLARGINE 100 [IU]/ML
24 INJECTION, SOLUTION SUBCUTANEOUS AT BEDTIME
Refills: 0 | Status: DISCONTINUED | OUTPATIENT
Start: 2020-08-13 | End: 2020-08-14

## 2020-08-13 RX ORDER — ACETAMINOPHEN 500 MG
975 TABLET ORAL ONCE
Refills: 0 | Status: COMPLETED | OUTPATIENT
Start: 2020-08-13 | End: 2020-08-15

## 2020-08-13 RX ADMIN — Medication 100 MILLIGRAM(S): at 06:10

## 2020-08-13 RX ADMIN — Medication 12 UNIT(S): at 19:04

## 2020-08-13 RX ADMIN — Medication 12 UNIT(S): at 10:29

## 2020-08-13 RX ADMIN — Medication 2: at 10:29

## 2020-08-13 RX ADMIN — Medication 5 MILLILITER(S): at 18:05

## 2020-08-13 RX ADMIN — POLYETHYLENE GLYCOL 3350 17 GRAM(S): 17 POWDER, FOR SOLUTION ORAL at 22:02

## 2020-08-13 RX ADMIN — Medication 3 MILLILITER(S): at 12:59

## 2020-08-13 RX ADMIN — TIOTROPIUM BROMIDE 1 CAPSULE(S): 18 CAPSULE ORAL; RESPIRATORY (INHALATION) at 12:59

## 2020-08-13 RX ADMIN — SENNA PLUS 2 TABLET(S): 8.6 TABLET ORAL at 22:02

## 2020-08-13 RX ADMIN — Medication 400 MILLIGRAM(S): at 12:59

## 2020-08-13 RX ADMIN — INSULIN GLARGINE 24 UNIT(S): 100 INJECTION, SOLUTION SUBCUTANEOUS at 22:02

## 2020-08-13 RX ADMIN — ACETAZOLAMIDE 250 MILLIGRAM(S): 250 TABLET ORAL at 11:25

## 2020-08-13 RX ADMIN — CHLORHEXIDINE GLUCONATE 1 APPLICATION(S): 213 SOLUTION TOPICAL at 08:52

## 2020-08-13 RX ADMIN — Medication 40 MILLIGRAM(S): at 06:11

## 2020-08-13 RX ADMIN — MONTELUKAST 10 MILLIGRAM(S): 4 TABLET, CHEWABLE ORAL at 12:59

## 2020-08-13 RX ADMIN — Medication 81 MILLIGRAM(S): at 12:59

## 2020-08-13 RX ADMIN — BUDESONIDE AND FORMOTEROL FUMARATE DIHYDRATE 2 PUFF(S): 160; 4.5 AEROSOL RESPIRATORY (INHALATION) at 06:11

## 2020-08-13 RX ADMIN — Medication 180 MILLIGRAM(S): at 06:11

## 2020-08-13 RX ADMIN — APIXABAN 5 MILLIGRAM(S): 2.5 TABLET, FILM COATED ORAL at 18:05

## 2020-08-13 RX ADMIN — Medication 3 MILLILITER(S): at 18:05

## 2020-08-13 RX ADMIN — Medication 1 TABLET(S): at 13:00

## 2020-08-13 RX ADMIN — APIXABAN 5 MILLIGRAM(S): 2.5 TABLET, FILM COATED ORAL at 06:12

## 2020-08-13 RX ADMIN — Medication 5 MILLILITER(S): at 06:10

## 2020-08-13 RX ADMIN — Medication 100 MILLIGRAM(S): at 22:16

## 2020-08-13 RX ADMIN — Medication 2000 UNIT(S): at 13:00

## 2020-08-13 RX ADMIN — PANTOPRAZOLE SODIUM 40 MILLIGRAM(S): 20 TABLET, DELAYED RELEASE ORAL at 06:12

## 2020-08-13 RX ADMIN — BUDESONIDE AND FORMOTEROL FUMARATE DIHYDRATE 2 PUFF(S): 160; 4.5 AEROSOL RESPIRATORY (INHALATION) at 18:05

## 2020-08-13 RX ADMIN — Medication 3 MILLILITER(S): at 06:10

## 2020-08-13 RX ADMIN — Medication 12 UNIT(S): at 14:21

## 2020-08-13 RX ADMIN — Medication 1: at 14:20

## 2020-08-13 RX ADMIN — Medication 0.25 MILLIGRAM(S): at 22:16

## 2020-08-13 RX ADMIN — Medication 40 MILLIGRAM(S): at 06:12

## 2020-08-13 RX ADMIN — Medication 10 MILLIGRAM(S): at 22:02

## 2020-08-13 RX ADMIN — ATORVASTATIN CALCIUM 80 MILLIGRAM(S): 80 TABLET, FILM COATED ORAL at 22:02

## 2020-08-13 NOTE — PROGRESS NOTE ADULT - ATTENDING COMMENTS
Agree with above NP note.  cv stable  continued edema, dyspnea  creat rising on iv lasix, stopped  cont diamox per med/pulmonary   cont a/c

## 2020-08-13 NOTE — PROGRESS NOTE ADULT - SUBJECTIVE AND OBJECTIVE BOX
Patient is a 60y old  Female who presents with a chief complaint of 60F p/w generalized weakness and difficulty walking (13 Aug 2020 10:44)    HPI: Pt continues to be belligerent, difficult to talk to. C/o dyspnea but able to speak in complete sentences.    Vital Signs Last 24 Hrs  T(C): 36.8 (13 Aug 2020 08:57), Max: 36.8 (12 Aug 2020 16:50)  T(F): 98.2 (13 Aug 2020 08:57), Max: 98.2 (12 Aug 2020 16:50)  HR: 81 (13 Aug 2020 08:57) (81 - 94)  BP: 130/75 (13 Aug 2020 08:57) (118/82 - 137/73)  BP(mean): --  RR: 18 (13 Aug 2020 08:57) (18 - 20)  SpO2: 98% (13 Aug 2020 10:54) (95% - 100%)                          8.5    11.25 )-----------( 228      ( 13 Aug 2020 09:05 )             28.5     08-13    138  |  94<L>  |  39<H>  ----------------------------<  189<H>  4.7   |  36<H>  |  1.32<H>    Ca    9.1      13 Aug 2020 09:05    MEDICATIONS  (STANDING):  acetaminophen   Tablet .. 975 milliGRAM(s) Oral once  acetaZOLAMIDE Injectable 250 milliGRAM(s) IV Push <User Schedule>  albuterol/ipratropium for Nebulization 3 milliLiter(s) Nebulizer every 6 hours  apixaban 5 milliGRAM(s) Oral every 12 hours  aspirin enteric coated 81 milliGRAM(s) Oral daily  atorvastatin 80 milliGRAM(s) Oral at bedtime  Biotene Dry Mouth Oral Rinse 5 milliLiter(s) Swish and Spit two times a day  bisacodyl Suppository 10 milliGRAM(s) Rectal daily  budesonide 160 MICROgram(s)/formoterol 4.5 MICROgram(s) Inhaler 2 Puff(s) Inhalation two times a day  chlorhexidine 2% Cloths 1 Application(s) Topical daily  cholecalciferol 2000 Unit(s) Oral daily  dextrose 5%. 1000 milliLiter(s) (50 mL/Hr) IV Continuous <Continuous>  dextrose 50% Injectable 25 Gram(s) IV Push once  diltiazem    milliGRAM(s) Oral daily  insulin glargine Injectable (LANTUS) 20 Unit(s) SubCutaneous at bedtime  insulin lispro (HumaLOG) corrective regimen sliding scale   SubCutaneous three times a day before meals  insulin lispro (HumaLOG) corrective regimen sliding scale   SubCutaneous at bedtime  insulin lispro Injectable (HumaLOG) 12 Unit(s) SubCutaneous three times a day with meals  lactulose Syrup 15 Gram(s) Oral two times a day  levoFLOXacin  Tablet 500 milliGRAM(s) Oral every 24 hours  montelukast 10 milliGRAM(s) Oral daily  multivitamin 1 Tablet(s) Oral daily  pantoprazole    Tablet 40 milliGRAM(s) Oral before breakfast  polyethylene glycol 3350 17 Gram(s) Oral daily  predniSONE   Tablet 40 milliGRAM(s) Oral daily  senna 2 Tablet(s) Oral at bedtime  theophylline ER (24 Hour) 400 milliGRAM(s) Oral daily  tiotropium 18 MICROgram(s) Capsule 1 Capsule(s) Inhalation daily    MEDICATIONS  (PRN):  ALPRAZolam 0.25 milliGRAM(s) Oral two times a day PRN Anxiety  glucagon  Injectable 1 milliGRAM(s) IntraMuscular once PRN Glucose LESS THAN 70 milligrams/deciliter  guaiFENesin   Syrup  (Sugar-Free) 100 milliGRAM(s) Oral every 6 hours PRN Cough

## 2020-08-13 NOTE — PROGRESS NOTE ADULT - ASSESSMENT
60F w COPD on 3LNC (?pending transplant list at [facility]), former smoker, CAD s/p stent (2016), afib on eliquis, uncontrolled DM2, raynaud phenomenon and recent hospitalization at [facility] for altered mental status and [facility] aw COPD exacerbation, LE swelling, weakness.     1. Acute COPD exacerbation- On PO Prednisone, Duoneb, Symbicort, Spiriva and Theophylline. Overnight and prn Bipap. Pt  comfortable off Bipap and able to speak in full sentences.     Pt belligerent and uncooperative during hospitalization, has refused to see many doctors including 2 pulmonary teams. Unwilling to engage in conversation and keep saying " You guys are doing nothing for me". Refusing to work with PT.     Has been on PO Prednisone since 8/2, will decrease dose.     2. LE edema, likely acute diastolic CHF- Has been on Diamox with poor response. Restarted on IV Lasix, but now with worsening Cr and metabolic alkalosis. Lasix hel.     3. DM2- A1C 7.5. Steroid induced hyperglycemia. Continue Lantus and premeal insulin- Lantus increased.    4. Paroxysmal a fib- Now in NSR. On Apixiban, Cardizem.    5. Constipation- SMOG enema given w good response.     6. Hyperkalemia- Resolved. 60F w COPD on 3LNC (?pending transplant list at [facility]), former smoker, CAD s/p stent (2016), afib on eliquis, uncontrolled DM2, raynaud phenomenon and recent hospitalization at [facility] for altered mental status and [facility] aw COPD exacerbation, LE swelling, weakness.     1. Acute COPD exacerbation- On PO Prednisone, Duoneb, Symbicort, Spiriva and Theophylline. Overnight and prn Bipap. Pt comfortable off Bipap and able to speak in full sentences.     Pt belligerent and uncooperative during hospitalization, has refused to see many doctors including 2 pulmonary teams. Unwilling to engage in conversation and keep saying " You guys are doing nothing for me". Refusing to work with PT.     Has been on PO Prednisone since 8/2, will decrease dose.     2. LE edema, likely acute diastolic CHF- Has been on Diamox with poor response. Restarted on IV Lasix, but now with worsening Cr and metabolic alkalosis. Lasix held.     3. DM2- A1C 7.5. Steroid induced hyperglycemia. Continue Lantus and premeal insulin- Lantus increased.    4. Paroxysmal a fib- Now in NSR. On Apixiban, Cardizem.    5. Constipation- SMOG enema given w good response.     6. Hyperkalemia- Resolved.

## 2020-08-13 NOTE — PROGRESS NOTE ADULT - ASSESSMENT
EVAN 1/7/19: no ALINA thrombus, unable to assess LV sys fx  echo 12/7/18: EF 71%, nl LV sys fx, mild diastolic dysfx     a/p    60 year old female with hx of D CHF, HTN, CAD s/p PCI, Afib/ aflutter, s/p Aflutter Ablation 12/2019, COPD, DM2, Anxiety, , raynaud phenomenon presenting with weakness, SOB , hyperkalemia.      1. SOB  -r/t COPD   -pulm f/u   -chest xray unremarkable  -covid 19 negative  -po steroids, neb, bipap   -no acs , cv stable   -ecg with known RBBB, new twi noted, hyperkalemia also noted on admission  -echo with normal LVEF, mild diastolic dysfunction     2. CAD s/p PCI   -no cp, no sob  -c/w ASA, statin  -echo noted above     3. Afib/aflutter s/p  Aflutter Ablation 12/2019  -in NSR, cw cardizem  mg daily  -CHADsVac=2, c/w eliquis     4. Acute on Chronic Diastolic CHF   -+dyspnea secondary to copd;  on bipap prn  -echo with normal lVEF   -creat elevated on lasix 40 mg IVP daily , started 8/12, if creat continues to rise tomorrow will stop IV lasix   -continue diamox    dvt ppx EVAN 1/7/19: no ALINA thrombus, unable to assess LV sys fx  echo 12/7/18: EF 71%, nl LV sys fx, mild diastolic dysfx     a/p    60 year old female with hx of D CHF, HTN, CAD s/p PCI, Afib/ aflutter, s/p Aflutter Ablation 12/2019, COPD, DM2, Anxiety, , raynaud phenomenon presenting with weakness, SOB , hyperkalemia.      1. SOB  -r/t COPD   -pulm f/u   -chest xray unremarkable  -covid 19 negative  -po steroids, neb, bipap   -no acs , cv stable   -ecg with known RBBB, new twi noted, hyperkalemia also noted on admission  -echo with normal LVEF, mild diastolic dysfunction     2. CAD s/p PCI   -no cp, no sob  -c/w ASA, statin  -echo noted above     3. Afib/aflutter s/p  Aflutter Ablation 12/2019  -in NSR, cw cardizem  mg daily  -CHADsVac=2, c/w eliquis     4. Acute on Chronic Diastolic CHF   -+dyspnea secondary to copd, [facility] chf  -on bipap prn  -echo with normal lVEF   -creat elevated on lasix 40 mg IVP daily, stopped today   -continue diamox    dvt ppx

## 2020-08-13 NOTE — CHART NOTE - NSCHARTNOTEFT_GEN_A_CORE
Nutrition Follow Up Note  Patient seen for: LOS follow up    Interim events noted, chart reviewed. Pt c COPD exacerbation, steroid induced hyperglycemia (T2DM- A1C 7.5%), pt also c LE edema, likely HF as per team. Noted pt previously c hyperkalemia, now resolved.     Source: pt, RN     Diet : Diet, Consistent Carbohydrate w/Evening Snack (08-08-20 @ 23:08)    Patient reports: she is eating well at this time and does not need anything or have any nutrition related questions, pt was on a phone call and wanted to get back to it. Noted per flow sheets intake has been about 25-50% of late, noted some snacks at bedside.  Noted pt did have constipation, now resolved, last BM earlier today.     Daily Weight: last standing wt on 8/11: 200.1 pounds, noted pt weighed 172 pounds back in July 2020, would continue to monitor wt, nutrition focused physical exam deferred given pt disinterested in interview at this time     Pertinent Medications: MEDICATIONS  (STANDING):  acetaminophen   Tablet .. 975 milliGRAM(s) Oral once  acetaZOLAMIDE Injectable 250 milliGRAM(s) IV Push <User Schedule>  albuterol/ipratropium for Nebulization 3 milliLiter(s) Nebulizer every 6 hours  apixaban 5 milliGRAM(s) Oral every 12 hours  aspirin enteric coated 81 milliGRAM(s) Oral daily  atorvastatin 80 milliGRAM(s) Oral at bedtime  Biotene Dry Mouth Oral Rinse 5 milliLiter(s) Swish and Spit two times a day  bisacodyl Suppository 10 milliGRAM(s) Rectal daily  budesonide 160 MICROgram(s)/formoterol 4.5 MICROgram(s) Inhaler 2 Puff(s) Inhalation two times a day  chlorhexidine 2% Cloths 1 Application(s) Topical daily  cholecalciferol 2000 Unit(s) Oral daily  dextrose 5%. 1000 milliLiter(s) (50 mL/Hr) IV Continuous <Continuous>  dextrose 50% Injectable 25 Gram(s) IV Push once  diltiazem    milliGRAM(s) Oral daily  furosemide   Injectable 40 milliGRAM(s) IV Push daily  insulin glargine Injectable (LANTUS) 20 Unit(s) SubCutaneous at bedtime  insulin lispro (HumaLOG) corrective regimen sliding scale   SubCutaneous three times a day before meals  insulin lispro (HumaLOG) corrective regimen sliding scale   SubCutaneous at bedtime  insulin lispro Injectable (HumaLOG) 12 Unit(s) SubCutaneous three times a day with meals  lactulose Syrup 15 Gram(s) Oral two times a day  levoFLOXacin  Tablet 500 milliGRAM(s) Oral every 24 hours  montelukast 10 milliGRAM(s) Oral daily  multivitamin 1 Tablet(s) Oral daily  pantoprazole    Tablet 40 milliGRAM(s) Oral before breakfast  polyethylene glycol 3350 17 Gram(s) Oral daily  predniSONE   Tablet 40 milliGRAM(s) Oral daily  senna 2 Tablet(s) Oral at bedtime  theophylline ER (24 Hour) 400 milliGRAM(s) Oral daily  tiotropium 18 MICROgram(s) Capsule 1 Capsule(s) Inhalation daily    MEDICATIONS  (PRN):  ALPRAZolam 0.25 milliGRAM(s) Oral two times a day PRN Anxiety  glucagon  Injectable 1 milliGRAM(s) IntraMuscular once PRN Glucose LESS THAN 70 milligrams/deciliter  guaiFENesin   Syrup  (Sugar-Free) 100 milliGRAM(s) Oral every 6 hours PRN Cough    Pertinent Labs: 08-13 @ 09:05: Na 138, BUN 39<H>, Cr 1.32<H>, <H>, K+ 4.7, Phos --, Mg --, Alk Phos --, ALT/SGPT --, AST/SGOT --, HbA1c --    Finger Sticks:  POCT Blood Glucose.: 210 mg/dL (08-13 @ 10:16)  POCT Blood Glucose.: 325 mg/dL (08-12 @ 21:29)  POCT Blood Glucose.: 161 mg/dL (08-12 @ 19:18)  POCT Blood Glucose.: 100 mg/dL (08-12 @ 13:50)    POCT glucose noted, pt is on Lantus and Correctional sliding scale insulin     Skin per nursing documentation: no pressure injuries   Edema: +3 danya. feet and ankle     Estimated Needs:   [x] no change since previous assessment      Previous Nutrition Diagnosis: altered nutrition related lab values   Nutrition Diagnosis continues at this time     New Nutrition Diagnosis: none at this time       Recommend  1) Continue c current diet.   2) Encouraged continued PO intake and made pt aware RD remains available as needed for further nutrition interventions.     Monitoring and Evaluation:     Continue to monitor Nutritional intake, Tolerance to diet prescription, weights, labs, skin integrity    RD remains available upon request and will follow up per protocol  Quin Oviedo MS RD CDN [facility],  #[phone]

## 2020-08-14 DIAGNOSIS — T14.90XA INJURY, UNSPECIFIED, INITIAL ENCOUNTER: ICD-10-CM

## 2020-08-14 DIAGNOSIS — I48.91 UNSPECIFIED ATRIAL FIBRILLATION: ICD-10-CM

## 2020-08-14 DIAGNOSIS — N17.9 ACUTE KIDNEY FAILURE, UNSPECIFIED: ICD-10-CM

## 2020-08-14 DIAGNOSIS — E66.9 OBESITY, UNSPECIFIED: ICD-10-CM

## 2020-08-14 DIAGNOSIS — J96.21 ACUTE AND CHRONIC RESPIRATORY FAILURE WITH HYPOXIA: ICD-10-CM

## 2020-08-14 DIAGNOSIS — I50.31 ACUTE DIASTOLIC (CONGESTIVE) HEART FAILURE: ICD-10-CM

## 2020-08-14 DIAGNOSIS — G47.33 OBSTRUCTIVE SLEEP APNEA (ADULT) (PEDIATRIC): ICD-10-CM

## 2020-08-14 DIAGNOSIS — Z29.9 ENCOUNTER FOR PROPHYLACTIC MEASURES, UNSPECIFIED: ICD-10-CM

## 2020-08-14 LAB
ANION GAP SERPL CALC-SCNC: 5 MMOL/L — SIGNIFICANT CHANGE UP (ref 5–17)
BUN SERPL-MCNC: 37 MG/DL — HIGH (ref 7–23)
CALCIUM SERPL-MCNC: 9.2 MG/DL — SIGNIFICANT CHANGE UP (ref 8.4–10.5)
CHLORIDE SERPL-SCNC: 96 MMOL/L — SIGNIFICANT CHANGE UP (ref 96–108)
CO2 SERPL-SCNC: 37 MMOL/L — HIGH (ref 22–31)
CREAT SERPL-MCNC: 1.39 MG/DL — HIGH (ref 0.5–1.3)
GLUCOSE BLDC GLUCOMTR-MCNC: 142 MG/DL — HIGH (ref 70–99)
GLUCOSE BLDC GLUCOMTR-MCNC: 148 MG/DL — HIGH (ref 70–99)
GLUCOSE BLDC GLUCOMTR-MCNC: 47 MG/DL — LOW (ref 70–99)
GLUCOSE BLDC GLUCOMTR-MCNC: 48 MG/DL — LOW (ref 70–99)
GLUCOSE BLDC GLUCOMTR-MCNC: 60 MG/DL — LOW (ref 70–99)
GLUCOSE BLDC GLUCOMTR-MCNC: 74 MG/DL — SIGNIFICANT CHANGE UP (ref 70–99)
GLUCOSE BLDC GLUCOMTR-MCNC: 90 MG/DL — SIGNIFICANT CHANGE UP (ref 70–99)
GLUCOSE SERPL-MCNC: 137 MG/DL — HIGH (ref 70–99)
HCT VFR BLD CALC: 29.7 % — LOW (ref 34.5–45)
HGB BLD-MCNC: 8.7 G/DL — LOW (ref 11.5–15.5)
MCHC RBC-ENTMCNC: 25.5 PG — LOW (ref 27–34)
MCHC RBC-ENTMCNC: 29.3 GM/DL — LOW (ref 32–36)
MCV RBC AUTO: 87.1 FL — SIGNIFICANT CHANGE UP (ref 80–100)
NRBC # BLD: 0 /100 WBCS — SIGNIFICANT CHANGE UP (ref 0–0)
PLATELET # BLD AUTO: 233 K/UL — SIGNIFICANT CHANGE UP (ref 150–400)
POTASSIUM SERPL-MCNC: 5.1 MMOL/L — SIGNIFICANT CHANGE UP (ref 3.5–5.3)
POTASSIUM SERPL-SCNC: 5.1 MMOL/L — SIGNIFICANT CHANGE UP (ref 3.5–5.3)
RBC # BLD: 3.41 M/UL — LOW (ref 3.8–5.2)
RBC # FLD: 14.8 % — HIGH (ref 10.3–14.5)
SODIUM SERPL-SCNC: 138 MMOL/L — SIGNIFICANT CHANGE UP (ref 135–145)
WBC # BLD: 11.69 K/UL — HIGH (ref 3.8–10.5)
WBC # FLD AUTO: 11.69 K/UL — HIGH (ref 3.8–10.5)

## 2020-08-14 PROCEDURE — 99233 SBSQ HOSP IP/OBS HIGH 50: CPT

## 2020-08-14 RX ORDER — AZITHROMYCIN 500 MG/1
250 TABLET, FILM COATED ORAL
Refills: 0 | Status: DISCONTINUED | OUTPATIENT
Start: 2020-08-14 | End: 2020-09-14

## 2020-08-14 RX ORDER — INSULIN GLARGINE 100 [IU]/ML
20 INJECTION, SOLUTION SUBCUTANEOUS AT BEDTIME
Refills: 0 | Status: DISCONTINUED | OUTPATIENT
Start: 2020-08-14 | End: 2020-08-28

## 2020-08-14 RX ORDER — DEXTROSE 50 % IN WATER 50 %
25 SYRINGE (ML) INTRAVENOUS ONCE
Refills: 0 | Status: COMPLETED | OUTPATIENT
Start: 2020-08-14 | End: 2020-08-14

## 2020-08-14 RX ORDER — INSULIN LISPRO 100/ML
10 VIAL (ML) SUBCUTANEOUS
Refills: 0 | Status: DISCONTINUED | OUTPATIENT
Start: 2020-08-14 | End: 2020-08-18

## 2020-08-14 RX ADMIN — TIOTROPIUM BROMIDE 1 CAPSULE(S): 18 CAPSULE ORAL; RESPIRATORY (INHALATION) at 13:13

## 2020-08-14 RX ADMIN — Medication 3 MILLILITER(S): at 00:00

## 2020-08-14 RX ADMIN — AZITHROMYCIN 250 MILLIGRAM(S): 500 TABLET, FILM COATED ORAL at 22:13

## 2020-08-14 RX ADMIN — APIXABAN 5 MILLIGRAM(S): 2.5 TABLET, FILM COATED ORAL at 18:58

## 2020-08-14 RX ADMIN — APIXABAN 5 MILLIGRAM(S): 2.5 TABLET, FILM COATED ORAL at 06:15

## 2020-08-14 RX ADMIN — INSULIN GLARGINE 20 UNIT(S): 100 INJECTION, SOLUTION SUBCUTANEOUS at 22:14

## 2020-08-14 RX ADMIN — Medication 180 MILLIGRAM(S): at 06:18

## 2020-08-14 RX ADMIN — Medication 12 UNIT(S): at 09:09

## 2020-08-14 RX ADMIN — ATORVASTATIN CALCIUM 80 MILLIGRAM(S): 80 TABLET, FILM COATED ORAL at 22:14

## 2020-08-14 RX ADMIN — Medication 20 MILLIGRAM(S): at 06:15

## 2020-08-14 RX ADMIN — Medication 5 MILLILITER(S): at 06:15

## 2020-08-14 RX ADMIN — Medication 3 MILLILITER(S): at 18:58

## 2020-08-14 RX ADMIN — BUDESONIDE AND FORMOTEROL FUMARATE DIHYDRATE 2 PUFF(S): 160; 4.5 AEROSOL RESPIRATORY (INHALATION) at 07:56

## 2020-08-14 RX ADMIN — BUDESONIDE AND FORMOTEROL FUMARATE DIHYDRATE 2 PUFF(S): 160; 4.5 AEROSOL RESPIRATORY (INHALATION) at 18:58

## 2020-08-14 RX ADMIN — Medication 3 MILLILITER(S): at 07:56

## 2020-08-14 RX ADMIN — PANTOPRAZOLE SODIUM 40 MILLIGRAM(S): 20 TABLET, DELAYED RELEASE ORAL at 06:15

## 2020-08-14 RX ADMIN — Medication 12 UNIT(S): at 13:16

## 2020-08-14 RX ADMIN — Medication 5 MILLILITER(S): at 22:13

## 2020-08-14 RX ADMIN — Medication 25 MILLILITER(S): at 19:00

## 2020-08-14 RX ADMIN — MONTELUKAST 10 MILLIGRAM(S): 4 TABLET, CHEWABLE ORAL at 18:58

## 2020-08-14 RX ADMIN — Medication 2000 UNIT(S): at 13:13

## 2020-08-14 RX ADMIN — Medication 1 TABLET(S): at 13:13

## 2020-08-14 RX ADMIN — CHLORHEXIDINE GLUCONATE 1 APPLICATION(S): 213 SOLUTION TOPICAL at 13:18

## 2020-08-14 RX ADMIN — Medication 400 MILLIGRAM(S): at 13:13

## 2020-08-14 RX ADMIN — Medication 81 MILLIGRAM(S): at 13:13

## 2020-08-14 RX ADMIN — ACETAZOLAMIDE 250 MILLIGRAM(S): 250 TABLET ORAL at 15:01

## 2020-08-14 RX ADMIN — LACTULOSE 15 GRAM(S): 10 SOLUTION ORAL at 06:15

## 2020-08-14 NOTE — PROGRESS NOTE ADULT - SUBJECTIVE AND OBJECTIVE BOX
Patient is a 60y old  Female who presents with a chief complaint of 60F p/w generalized weakness and difficulty walking (14 Aug 2020 13:27)    HPI: Pt continues to be combative, belligerent.     Vital Signs Last 24 Hrs  T(C): 37.4 (14 Aug 2020 12:36), Max: 37.4 (14 Aug 2020 12:36)  T(F): 99.3 (14 Aug 2020 12:36), Max: 99.3 (14 Aug 2020 12:36)  HR: 83 (14 Aug 2020 12:36) (75 - 99)  BP: 144/76 (14 Aug 2020 12:36) (107/77 - 146/74)  BP(mean): --  RR: 18 (14 Aug 2020 12:36) (18 - 18)  SpO2: 100% (14 Aug 2020 12:36) (96% - 100%)                          8.7    11.69 )-----------( 233      ( 14 Aug 2020 09:28 )             29.7     08-14    138  |  96  |  37<H>  ----------------------------<  137<H>  5.1   |  37<H>  |  1.39<H>    Ca    9.2      14 Aug 2020 09:28    MEDICATIONS  (STANDING):  acetaminophen   Tablet .. 975 milliGRAM(s) Oral once  acetaZOLAMIDE Injectable 250 milliGRAM(s) IV Push <User Schedule>  albuterol/ipratropium for Nebulization 3 milliLiter(s) Nebulizer every 6 hours  apixaban 5 milliGRAM(s) Oral every 12 hours  aspirin enteric coated 81 milliGRAM(s) Oral daily  atorvastatin 80 milliGRAM(s) Oral at bedtime  Biotene Dry Mouth Oral Rinse 5 milliLiter(s) Swish and Spit two times a day  bisacodyl Suppository 10 milliGRAM(s) Rectal daily  budesonide 160 MICROgram(s)/formoterol 4.5 MICROgram(s) Inhaler 2 Puff(s) Inhalation two times a day  chlorhexidine 2% Cloths 1 Application(s) Topical daily  cholecalciferol 2000 Unit(s) Oral daily  dextrose 5%. 1000 milliLiter(s) (50 mL/Hr) IV Continuous <Continuous>  dextrose 50% Injectable 25 Gram(s) IV Push once  diltiazem    milliGRAM(s) Oral daily  insulin glargine Injectable (LANTUS) 24 Unit(s) SubCutaneous at bedtime  insulin lispro (HumaLOG) corrective regimen sliding scale   SubCutaneous three times a day before meals  insulin lispro (HumaLOG) corrective regimen sliding scale   SubCutaneous at bedtime  insulin lispro Injectable (HumaLOG) 12 Unit(s) SubCutaneous three times a day with meals  lactulose Syrup 15 Gram(s) Oral two times a day  levoFLOXacin  Tablet 500 milliGRAM(s) Oral every 24 hours  montelukast 10 milliGRAM(s) Oral daily  multivitamin 1 Tablet(s) Oral daily  pantoprazole    Tablet 40 milliGRAM(s) Oral before breakfast  polyethylene glycol 3350 17 Gram(s) Oral daily  predniSONE   Tablet 20 milliGRAM(s) Oral daily  senna 2 Tablet(s) Oral at bedtime  theophylline ER (24 Hour) 400 milliGRAM(s) Oral daily  tiotropium 18 MICROgram(s) Capsule 1 Capsule(s) Inhalation daily    MEDICATIONS  (PRN):  ALPRAZolam 0.25 milliGRAM(s) Oral two times a day PRN Anxiety  glucagon  Injectable 1 milliGRAM(s) IntraMuscular once PRN Glucose LESS THAN 70 milligrams/deciliter  guaiFENesin   Syrup  (Sugar-Free) 100 milliGRAM(s) Oral every 6 hours PRN Cough

## 2020-08-14 NOTE — PROGRESS NOTE ADULT - SUBJECTIVE AND OBJECTIVE BOX
CARDIOLOGY FOLLOW UP - Dr. Brunson    CC no cp  unchanged SOB, no increase in symptoms   c/o LL foot blister and swelling in legs       PHYSICAL EXAM:  T(C): 37.4 (08-14-20 @ 12:36), Max: 37.4 (08-14-20 @ 12:36)  HR: 83 (08-14-20 @ 12:36) (75 - 99)  BP: 144/76 (08-14-20 @ 12:36) (107/77 - 146/74)  RR: 18 (08-14-20 @ 12:36) (18 - 18)  SpO2: 100% (08-14-20 @ 12:36) (96% - 100%)  Wt(kg): --  I&O's Summary    13 Aug 2020 07:01  -  14 Aug 2020 07:00  --------------------------------------------------------  IN: 1100 mL / OUT: 1100 mL / NET: 0 mL    14 Aug 2020 07:01  -  14 Aug 2020 13:27  --------------------------------------------------------  IN: 0 mL / OUT: 800 mL / NET: -800 mL        Appearance: Normal	  Cardiovascular: Normal S1 S2,RRR, No JVD, No murmurs  Respiratory: diminished all bases   Gastrointestinal:  Soft, Non-tender, + BS	  Extremities: bl le edema ++. blister L foot         MEDICATIONS  (STANDING):  acetaminophen   Tablet .. 975 milliGRAM(s) Oral once  acetaZOLAMIDE Injectable 250 milliGRAM(s) IV Push <User Schedule>  albuterol/ipratropium for Nebulization 3 milliLiter(s) Nebulizer every 6 hours  apixaban 5 milliGRAM(s) Oral every 12 hours  aspirin enteric coated 81 milliGRAM(s) Oral daily  atorvastatin 80 milliGRAM(s) Oral at bedtime  Biotene Dry Mouth Oral Rinse 5 milliLiter(s) Swish and Spit two times a day  bisacodyl Suppository 10 milliGRAM(s) Rectal daily  budesonide 160 MICROgram(s)/formoterol 4.5 MICROgram(s) Inhaler 2 Puff(s) Inhalation two times a day  chlorhexidine 2% Cloths 1 Application(s) Topical daily  cholecalciferol 2000 Unit(s) Oral daily  dextrose 5%. 1000 milliLiter(s) (50 mL/Hr) IV Continuous <Continuous>  dextrose 50% Injectable 25 Gram(s) IV Push once  diltiazem    milliGRAM(s) Oral daily  insulin glargine Injectable (LANTUS) 24 Unit(s) SubCutaneous at bedtime  insulin lispro (HumaLOG) corrective regimen sliding scale   SubCutaneous three times a day before meals  insulin lispro (HumaLOG) corrective regimen sliding scale   SubCutaneous at bedtime  insulin lispro Injectable (HumaLOG) 12 Unit(s) SubCutaneous three times a day with meals  lactulose Syrup 15 Gram(s) Oral two times a day  levoFLOXacin  Tablet 500 milliGRAM(s) Oral every 24 hours  montelukast 10 milliGRAM(s) Oral daily  multivitamin 1 Tablet(s) Oral daily  pantoprazole    Tablet 40 milliGRAM(s) Oral before breakfast  polyethylene glycol 3350 17 Gram(s) Oral daily  predniSONE   Tablet 20 milliGRAM(s) Oral daily  senna 2 Tablet(s) Oral at bedtime  theophylline ER (24 Hour) 400 milliGRAM(s) Oral daily  tiotropium 18 MICROgram(s) Capsule 1 Capsule(s) Inhalation daily      TELEMETRY: 	    ECG:  	  RADIOLOGY:   DIAGNOSTIC TESTING:  [ ] Echocardiogram:  [ ]  Catheterization:  [ ] Stress Test:    OTHER: 	    LABS:	 	                                8.7    11.69 )-----------( 233      ( 14 Aug 2020 09:28 )             29.7     08-14    138  |  96  |  37<H>  ----------------------------<  137<H>  5.1   |  37<H>  |  1.39<H>    Ca    9.2      14 Aug 2020 09:28

## 2020-08-14 NOTE — PROGRESS NOTE ADULT - ASSESSMENT
EVAN 1/7/19: no ALINA thrombus, unable to assess LV sys fx  echo 12/7/18: EF 71%, nl LV sys fx, mild diastolic dysfx     a/p    60 year old female with hx of D CHF, HTN, CAD s/p PCI, Afib/ aflutter, s/p Aflutter Ablation 12/2019, COPD, DM2, Anxiety, , raynaud phenomenon presenting with weakness, SOB , hyperkalemia.      1. SOB  -r/t COPD   -pulm f/u   -chest xray unremarkable  -covid 19 negative  -no acs , cv stable   -ecg with known RBBB, new twi noted, hyperkalemia also noted on admission  -echo with normal LVEF, mild diastolic dysfunction   - On PO Prednisone, Duoneb, Symbicort, Spiriva and Theophylline.    2. CAD s/p PCI   -no cp, no sob  -c/w ASA, statin  -echo noted above     3. Afib/aflutter s/p  Aflutter Ablation 12/2019  -in NSR, cw cardizem  mg daily  -CHADsVac=2, c/w eliquis     4. Acute on Chronic Diastolic CHF   -+dyspnea secondary to copd, maxwell chf  -on bipap  -echo with normal lVEF   - Has been on Diamox with poor response  -Restarted on IV Lasix, but now with worsening Cr and metabolic alkalosis. lasix on hold       dvt ppx

## 2020-08-14 NOTE — PROGRESS NOTE ADULT - ATTENDING COMMENTS
Agree with above NP note.  lasix remains on hold  med/pulmo f/u  continued dyspnea, le edema  diamox as ordered per pulmo/med

## 2020-08-14 NOTE — PROGRESS NOTE ADULT - PROBLEM SELECTOR PLAN 7
- patient with a large left foot blister which looks like it is about to pop  - would suggest wound care evaluation  - reduction of steroids may help with water retention issues

## 2020-08-14 NOTE — PROGRESS NOTE ADULT - SUBJECTIVE AND OBJECTIVE BOX
[facility] DIVISION OF PULMONARY, CRITICAL CARE and SLEEP MEDICINE  PULMONARY PROGRESS NOTE  OFFICE NUMBER: [phone]    PATIENT INFORMATION:  NAME: GUY HARTMAN:  MRN: [id number]    CHIEF COMPLAINT: Patient is a 60y old  Female who presents with a chief complaint of 60F p/w generalized weakness and difficulty walking (14 Aug 2020 15:22)      [x] INITIAL CONSULT, H&P, FAMILY HISTORY and PAST MEDICAL AND SURGICAL HISTORY REVIEWED    ** Patient today willing to allow pulmonary evaluation. **    OVERNIGHT EVENTS or CHANGES TO HPI:   Patient uses 3L O2 at home - currently on 4L O2 here and saturating %  Patient with significant volume overload - has been on/off Lasix due to renal function but reportedly making good urine. Now getting Diamox.  Patient using BIPAP 15/5 - with good results reportedly  PO2 161 on ABG from 8/13    ========================REVIEW OF SYSTEMS========================  CONSTITUTIONAL: Frustrated, LE edema, pain in back  CARDIOVASCULAR: Denies chest pain or palpitations  PULMONARY: Dyspnea, no hemoptysis  [x] REMAINING REVIEW OF SYSTEMS NEGATIVE  [] UNABLE TO OBTAIN REVIEW OF SYSTEMS DUE TO _______________    ========================MEDICATIONS=============================  MEDICATIONS  (STANDING):  acetaminophen   Tablet .. 975 milliGRAM(s) Oral once  acetaZOLAMIDE Injectable 250 milliGRAM(s) IV Push <User Schedule>  albuterol/ipratropium for Nebulization 3 milliLiter(s) Nebulizer every 6 hours  apixaban 5 milliGRAM(s) Oral every 12 hours  aspirin enteric coated 81 milliGRAM(s) Oral daily  atorvastatin 80 milliGRAM(s) Oral at bedtime  Biotene Dry Mouth Oral Rinse 5 milliLiter(s) Swish and Spit two times a day  bisacodyl Suppository 10 milliGRAM(s) Rectal daily  budesonide 160 MICROgram(s)/formoterol 4.5 MICROgram(s) Inhaler 2 Puff(s) Inhalation two times a day  chlorhexidine 2% Cloths 1 Application(s) Topical daily  cholecalciferol 2000 Unit(s) Oral daily  dextrose 5%. 1000 milliLiter(s) (50 mL/Hr) IV Continuous <Continuous>  dextrose 50% Injectable 25 Gram(s) IV Push once  diltiazem    milliGRAM(s) Oral daily  insulin glargine Injectable (LANTUS) 20 Unit(s) SubCutaneous at bedtime  insulin lispro (HumaLOG) corrective regimen sliding scale   SubCutaneous three times a day before meals  insulin lispro (HumaLOG) corrective regimen sliding scale   SubCutaneous at bedtime  insulin lispro Injectable (HumaLOG) 10 Unit(s) SubCutaneous three times a day with meals  lactulose Syrup 15 Gram(s) Oral two times a day  montelukast 10 milliGRAM(s) Oral daily  multivitamin 1 Tablet(s) Oral daily  pantoprazole    Tablet 40 milliGRAM(s) Oral before breakfast  polyethylene glycol 3350 17 Gram(s) Oral daily  predniSONE   Tablet 20 milliGRAM(s) Oral daily  senna 2 Tablet(s) Oral at bedtime  theophylline ER (24 Hour) 400 milliGRAM(s) Oral daily  tiotropium 18 MICROgram(s) Capsule 1 Capsule(s) Inhalation daily      MEDICATIONS  (PRN):  ALPRAZolam 0.25 milliGRAM(s) Oral two times a day PRN Anxiety  glucagon  Injectable 1 milliGRAM(s) IntraMuscular once PRN Glucose LESS THAN 70 milligrams/deciliter  guaiFENesin   Syrup  (Sugar-Free) 100 milliGRAM(s) Oral every 6 hours PRN Cough      ========================PHYSICAL EXAM============================    VITALS: ICU Vital Signs Last 24 Hrs  T(C): 36.4 (14 Aug 2020 16:05), Max: 37.4 (14 Aug 2020 12:36)  T(F): 97.6 (14 Aug 2020 16:05), Max: 99.3 (14 Aug 2020 12:36)  HR: 85 (14 Aug 2020 16:05) (75 - 99)  BP: 126/62 (14 Aug 2020 16:05) (107/77 - 146/74)  RR: 18 (14 Aug 2020 16:05) (18 - 18)  SpO2: 100% (14 Aug 2020 16:05) (96% - 100%)      INTAKE and OUTPUT: I&O's Summary    13 Aug 2020 07:01  -  14 Aug 2020 07:00  --------------------------------------------------------  IN: 1100 mL / OUT: 1100 mL / NET: 0 mL    14 Aug 2020 07:01  -  14 Aug 2020 16:23  --------------------------------------------------------  IN: 0 mL / OUT: 1225 mL / NET: -1225 mL        VENTILATOR SETTINGS: N/A    GENERAL:  AAOx3, baseline dyspnea  EYES: anicteric, EOMI  EAR/NOSE/MOUTH/THROAT: NCAT, MMM, nares clear, trachea midline, no thrush  NECK: supple, JVD  LYMPH NODES: no palpable supraclavicular LAD   CARDIOVASCULAR: RRR, S1S2, systolic murmur  RESPIRATORY: prolonged expiratory phase, no wheezing  ABDOMEN: soft, obese, NT, ND, +BS  EXTREMITIES: no clubbing or cyanosis, bilateral LE edema (pitting/ now wrapped)  SKIN: LE wrapped bilaterally, large blister on left foot  MUSCULOSKELETAL: strength diminished  NEUROLOGIC: nonfocal exam, pain in back side  PSYCHIATRIC: calm    ========================LABORATORY RESULTS AND IMAGING=============                        8.7    11.69 )-----------( 233      ( 14 Aug 2020 09:28 )             29.7                                                    08-14    138  |  96  |  37<H>  ----------------------------<  137<H>  5.1   |  37<H>  |  1.39<H>    Ca    9.2      14 Aug 2020 09:28        ABG - ( 13 Aug 2020 17:40 )  pH, Arterial: 7.35  pH, Blood: x     /  pCO2: 68    /  pO2: 161   / HCO3: 37    / Base Excess: 10.1  /  SaO2: 99          Creatinine Trend: 1.39<--, 1.32<--, 1.15<--, 1.15<--, 1.12<--, 1.19<--    CT CHEST:     ECHO: < from: Transthoracic Echocardiogram (08.05.20 @ 02:57) >  Mitral Valve: Normal mitral valve. Minimal mitral  regurgitation.  Aortic Valve/Aorta: Aortic valve not well visualized. Peak  transaortic valve gradient equals 13 mm Hg, mean  transaortic valve gradient equals 6 mm Hg, aortic valve  velocity time integral equals 30 cm. Peak left ventricular  outflow tract gradient equals 5 mm Hg, mean gradient is  equal to 2 mm Hg, LVOT velocity time integral equals 15 cm.  Aortic Root: 3.4 cm.  Left Atrium: Normal left atrium.  Left Ventricle: Normal left ventricular systolic function.  No segmental wall motion abnormalities. Normal left  ventricular internal dimensions and wall thicknesses. Mild  diastolic dysfunction (Stage I).  Right Heart: Normal right atrium. Normal right ventricular  size and systolic function. Normal tricuspid valve.  Pericardium/Pleura: Normal pericardium with no pericardial  effusion.  Hemodynamic: Estimated right atrial pressure is 8 mm Hg.  ------------------------------------------------------------------------  Conclusions:  1. Normal left ventricular internal dimensions and wall  thicknesses.  2. Normal left ventricular systolic function. No segmental  wall motion abnormalities.  3. Mild diastolic dysfunction (Stage I).  4. Normal right ventricular size and systolic function.    < end of copied text >      [] RADIOLOGY REVIEWED AND INTERPRETED BY ME      THANK YOU FOR ALLOWING US TO PARTICIPATE IN THE CARE OF THIS PATIENT

## 2020-08-14 NOTE — PROGRESS NOTE ADULT - ATTENDING COMMENTS
Pt abusive towards nursing and medical staff and repeatedly refused to discuss plan for medical management or discharge. Repeatedly states " No on knows what they are doing, nothing that you guys have done will help me". She now agrees to see house pulm team again- will call.    Overall, her respiratory status is stable and she tolerates being off Bipap. She has chronic hypercapnia. Is saturating 100% on 4L.    Will continue discussions and plan on discharge if remains stable. Pt abusive towards nursing and medical staff and has repeatedly refused to discuss plan for medical management or discharge.   Repeatedly states " No one knows what they are doing, nothing that you guys have done will help me".     Overall, her respiratory status is stable and she tolerates being off Bipap. She has chronic hypercapnia. Is saturating 100% on 4L.    Will continue discussions and plan on discharge if remains stable.

## 2020-08-14 NOTE — PROGRESS NOTE ADULT - ASSESSMENT
61 y/o F with PMH of COPD on home oxygen 4L NC (Pulmonary: Dr. Anita Mathew), AF on Eliquis, CAD s/p PCI recently hospitalized at [facility] for AMS and AURORA.  At discharge, she was sent home on prednisone and returns with complaints of LE edema.   She was noted to have increased WOB and pulmonary was consulted for further management.    She is able to talk in full sentences at present and does not have evidence of accessory muscle use or respiratory distress at rest. She is limited in movement by pain in her LE due to swelling/blister as well as pain in her back from prior fall

## 2020-08-14 NOTE — PROGRESS NOTE ADULT - PROBLEM SELECTOR PLAN 2
- Patient has very severe COPD with PFTs from 2018 showing an FEV1/FVC of 27%, with an FEV1: 27% and a DLCO: 23  - She was initially treated with steroids and antibiotics for possible COPD exacerbation.   - She does not notice any difference with steroids and given potential side effects would suggest tapering this off - 20mg x 2 days then 10mg x 3 days then OFF (she does NOT use steroids at home)  - Patient tells me she was taking Azithromycin QD at home for her COPD - would suggest restarting this at 250mg M/W/F  - patient needs to have continued followup with her lung transplant team at [facility]. Given the severity of her prior PFTs she is likely to be dyspneic all the time  - continue supplemental O2 with goal O2 sat > 88% - she does NOT need to be at 100%  - continue bronchodilator therapy

## 2020-08-14 NOTE — PROGRESS NOTE ADULT - ASSESSMENT
60F w COPD on 3LNC (?pending transplant list at [facility]), former smoker, CAD s/p stent (2016), afib on eliquis, uncontrolled DM2, raynaud phenomenon and recent hospitalization at [facility] for altered mental status and AURORA aw COPD exacerbation, LE swelling, weakness.     1. Acute COPD exacerbation- On PO Prednisone, Duoneb, Symbicort, Spiriva and Theophylline. Overnight and prn Bipap. Pt comfortable off Bipap and able to speak in full sentences.     Pt belligerent and uncooperative during hospitalization, has refused to see many doctors including 2 pulmonary teams. Unwilling to engage in conversation and keep saying " You guys are doing nothing for me". Refusing to work with PT.     Has been on PO Prednisone since 8/2, dose decreased yesterday.    2. LE edema, likely acute diastolic CHF- Has been on Diamox with poor response. Restarted on IV Lasix, but developed worsening Cr and metabolic alkalosis, lasix held. Continue DIamox.    3. DM2- A1C 7.5. Steroid induced hyperglycemia. Continue Lantus and premeal insulin, dose decreased today and am fingersticks borderline low.     4. Paroxysmal a fib- Now in NSR. On Apixiban, Cardizem.    5. Constipation- SMOG enema given w good response.     6. Hyperkalemia- Resolved. 60F w COPD on 3LNC (?pending transplant list at [facility]), former smoker, CAD s/p stent (2016), afib on eliquis, uncontrolled DM2, raynaud phenomenon and recent hospitalization at [facility] for altered mental status and AURORA aw COPD exacerbation, LE swelling, weakness.     1. Acute COPD exacerbation- On PO Prednisone, Duoneb, Symbicort, Spiriva and Theophylline. Overnight and prn Bipap. Pt comfortable off Bipap and able to speak in full sentences.     Pt belligerent and uncooperative during hospitalization, has refused to see many doctors including 2 pulmonary teams. Unwilling to engage in conversation and keep saying " You guys are doing nothing for me". Refusing to work with PT.     She now agrees to see house pulm team again- will call.    Has been on PO Prednisone since 8/2, dose decreased yesterday.    2. LE edema, likely acute diastolic CHF- Has been on Diamox with poor response. Restarted on IV Lasix, but developed worsening Cr and metabolic alkalosis, lasix held. Continue DIamox.    3. DM2- A1C 7.5. Steroid induced hyperglycemia. Continue Lantus and premeal insulin, dose decreased today as am fingersticks borderline low.     4. Paroxysmal a fib- Now in NSR. On Apixiban, Cardizem.    5. Constipation- SMOG enema given w good response.     6. Hyperkalemia- Resolved.

## 2020-08-14 NOTE — PROGRESS NOTE ADULT - PROBLEM SELECTOR PLAN 6
- continue diuresis as able with goal NET negative  - monitor renal function and serum bicarb  - check ABG if any concern regarding acidosis or any changes in mental status

## 2020-08-14 NOTE — PROGRESS NOTE ADULT - PROBLEM SELECTOR PLAN 1
- patient with dyspnea which is multifactorial in the setting of acute on chronic respiratory failure. Her dyspnea is likely related to underlying lung disease, cardiovascular dysfunction, obesity, and deconditioning

## 2020-08-14 NOTE — PROGRESS NOTE ADULT - PROBLEM SELECTOR PLAN 9
- DVT proph - on AC  - GI proph  - Maintain O2 sat > 88%  - Incentive spirometer and acapella  - OOB and PT as able

## 2020-08-14 NOTE — PROGRESS NOTE ADULT - ATTENDING COMMENTS
Patient is able to talk in full sentences and currently stable from a pulmonary standpoint. She has quite extensive obstructive lung disease and is being evaluated at the [facility]. No acute pulmonary interventions at this time.    - Would suggest tapering her steroids to off.  - She needs continued volume removal with diuresis as she appears volume overloaded. She would benefit from being net negative so please monitor I/O carefully.   - Patient has a primary respiratory acidosis with a secondary metabolic alkalosis. Her pH and serum bicarb should be monitored closely. Her serum bicarb was previously in the 40s and at this point it is less than that. I do think furosemide or bumex would be a better diuretic as long as her serum bicarb is less than 40 and/or her pH remains below 7.45.    Pulmonary to remain available. Please call with any questions.

## 2020-08-14 NOTE — PROGRESS NOTE ADULT - PROBLEM SELECTOR PLAN 5
- diastolic heart failure noted on echo  - surprisingly no evidence of pulmonary hypertension  - continue diuresis as able with goal net negative.

## 2020-08-15 DIAGNOSIS — E11.649 TYPE 2 DIABETES MELLITUS WITH HYPOGLYCEMIA WITHOUT COMA: ICD-10-CM

## 2020-08-15 LAB
ANION GAP SERPL CALC-SCNC: 6 MMOL/L — SIGNIFICANT CHANGE UP (ref 5–17)
BUN SERPL-MCNC: 39 MG/DL — HIGH (ref 7–23)
CALCIUM SERPL-MCNC: 9.2 MG/DL — SIGNIFICANT CHANGE UP (ref 8.4–10.5)
CHLORIDE SERPL-SCNC: 96 MMOL/L — SIGNIFICANT CHANGE UP (ref 96–108)
CO2 SERPL-SCNC: 36 MMOL/L — HIGH (ref 22–31)
CREAT SERPL-MCNC: 1.29 MG/DL — SIGNIFICANT CHANGE UP (ref 0.5–1.3)
GLUCOSE BLDC GLUCOMTR-MCNC: 119 MG/DL — HIGH (ref 70–99)
GLUCOSE BLDC GLUCOMTR-MCNC: 138 MG/DL — HIGH (ref 70–99)
GLUCOSE BLDC GLUCOMTR-MCNC: 150 MG/DL — HIGH (ref 70–99)
GLUCOSE BLDC GLUCOMTR-MCNC: 154 MG/DL — HIGH (ref 70–99)
GLUCOSE BLDC GLUCOMTR-MCNC: 87 MG/DL — SIGNIFICANT CHANGE UP (ref 70–99)
GLUCOSE SERPL-MCNC: 152 MG/DL — HIGH (ref 70–99)
HCT VFR BLD CALC: 29.2 % — LOW (ref 34.5–45)
HGB BLD-MCNC: 8.5 G/DL — LOW (ref 11.5–15.5)
MCHC RBC-ENTMCNC: 25.4 PG — LOW (ref 27–34)
MCHC RBC-ENTMCNC: 29.1 GM/DL — LOW (ref 32–36)
MCV RBC AUTO: 87.2 FL — SIGNIFICANT CHANGE UP (ref 80–100)
NRBC # BLD: 0 /100 WBCS — SIGNIFICANT CHANGE UP (ref 0–0)
PLATELET # BLD AUTO: 227 K/UL — SIGNIFICANT CHANGE UP (ref 150–400)
POTASSIUM SERPL-MCNC: 4.5 MMOL/L — SIGNIFICANT CHANGE UP (ref 3.5–5.3)
POTASSIUM SERPL-SCNC: 4.5 MMOL/L — SIGNIFICANT CHANGE UP (ref 3.5–5.3)
RBC # BLD: 3.35 M/UL — LOW (ref 3.8–5.2)
RBC # FLD: 14.8 % — HIGH (ref 10.3–14.5)
SARS-COV-2 RNA SPEC QL NAA+PROBE: SIGNIFICANT CHANGE UP
SODIUM SERPL-SCNC: 138 MMOL/L — SIGNIFICANT CHANGE UP (ref 135–145)
WBC # BLD: 12.79 K/UL — HIGH (ref 3.8–10.5)
WBC # FLD AUTO: 12.79 K/UL — HIGH (ref 3.8–10.5)

## 2020-08-15 PROCEDURE — 99232 SBSQ HOSP IP/OBS MODERATE 35: CPT

## 2020-08-15 PROCEDURE — 99233 SBSQ HOSP IP/OBS HIGH 50: CPT | Mod: GC

## 2020-08-15 RX ORDER — BUMETANIDE 0.25 MG/ML
1 INJECTION INTRAMUSCULAR; INTRAVENOUS DAILY
Refills: 0 | Status: DISCONTINUED | OUTPATIENT
Start: 2020-08-15 | End: 2020-08-26

## 2020-08-15 RX ADMIN — Medication 3 MILLILITER(S): at 23:31

## 2020-08-15 RX ADMIN — PANTOPRAZOLE SODIUM 40 MILLIGRAM(S): 20 TABLET, DELAYED RELEASE ORAL at 06:36

## 2020-08-15 RX ADMIN — APIXABAN 5 MILLIGRAM(S): 2.5 TABLET, FILM COATED ORAL at 06:36

## 2020-08-15 RX ADMIN — Medication 3 MILLILITER(S): at 13:00

## 2020-08-15 RX ADMIN — Medication 180 MILLIGRAM(S): at 06:36

## 2020-08-15 RX ADMIN — APIXABAN 5 MILLIGRAM(S): 2.5 TABLET, FILM COATED ORAL at 18:55

## 2020-08-15 RX ADMIN — Medication 10 UNIT(S): at 12:59

## 2020-08-15 RX ADMIN — SENNA PLUS 2 TABLET(S): 8.6 TABLET ORAL at 21:21

## 2020-08-15 RX ADMIN — Medication 1 TABLET(S): at 13:04

## 2020-08-15 RX ADMIN — LACTULOSE 15 GRAM(S): 10 SOLUTION ORAL at 06:36

## 2020-08-15 RX ADMIN — Medication 81 MILLIGRAM(S): at 13:02

## 2020-08-15 RX ADMIN — Medication 2000 UNIT(S): at 13:01

## 2020-08-15 RX ADMIN — Medication 0: at 21:56

## 2020-08-15 RX ADMIN — Medication 3 MILLILITER(S): at 18:54

## 2020-08-15 RX ADMIN — MONTELUKAST 10 MILLIGRAM(S): 4 TABLET, CHEWABLE ORAL at 13:02

## 2020-08-15 RX ADMIN — Medication 3 MILLILITER(S): at 06:36

## 2020-08-15 RX ADMIN — ATORVASTATIN CALCIUM 80 MILLIGRAM(S): 80 TABLET, FILM COATED ORAL at 21:21

## 2020-08-15 RX ADMIN — CHLORHEXIDINE GLUCONATE 1 APPLICATION(S): 213 SOLUTION TOPICAL at 13:00

## 2020-08-15 RX ADMIN — BUMETANIDE 1 MILLIGRAM(S): 0.25 INJECTION INTRAMUSCULAR; INTRAVENOUS at 15:06

## 2020-08-15 RX ADMIN — TIOTROPIUM BROMIDE 1 CAPSULE(S): 18 CAPSULE ORAL; RESPIRATORY (INHALATION) at 13:04

## 2020-08-15 RX ADMIN — Medication 5 MILLILITER(S): at 06:36

## 2020-08-15 RX ADMIN — Medication 975 MILLIGRAM(S): at 18:56

## 2020-08-15 RX ADMIN — Medication 5 MILLILITER(S): at 18:52

## 2020-08-15 RX ADMIN — Medication 100 MILLIGRAM(S): at 00:19

## 2020-08-15 RX ADMIN — Medication 10 MILLIGRAM(S): at 21:21

## 2020-08-15 RX ADMIN — INSULIN GLARGINE 20 UNIT(S): 100 INJECTION, SOLUTION SUBCUTANEOUS at 21:56

## 2020-08-15 RX ADMIN — ACETAZOLAMIDE 250 MILLIGRAM(S): 250 TABLET ORAL at 13:05

## 2020-08-15 RX ADMIN — BUDESONIDE AND FORMOTEROL FUMARATE DIHYDRATE 2 PUFF(S): 160; 4.5 AEROSOL RESPIRATORY (INHALATION) at 06:37

## 2020-08-15 RX ADMIN — Medication 0.25 MILLIGRAM(S): at 23:52

## 2020-08-15 RX ADMIN — Medication 3 MILLILITER(S): at 00:19

## 2020-08-15 RX ADMIN — Medication 400 MILLIGRAM(S): at 13:03

## 2020-08-15 RX ADMIN — Medication 1: at 08:48

## 2020-08-15 RX ADMIN — Medication 975 MILLIGRAM(S): at 19:00

## 2020-08-15 RX ADMIN — Medication 10 UNIT(S): at 08:47

## 2020-08-15 RX ADMIN — Medication 20 MILLIGRAM(S): at 06:36

## 2020-08-15 RX ADMIN — BUDESONIDE AND FORMOTEROL FUMARATE DIHYDRATE 2 PUFF(S): 160; 4.5 AEROSOL RESPIRATORY (INHALATION) at 18:51

## 2020-08-15 RX ADMIN — POLYETHYLENE GLYCOL 3350 17 GRAM(S): 17 POWDER, FOR SOLUTION ORAL at 21:21

## 2020-08-15 RX ADMIN — LACTULOSE 15 GRAM(S): 10 SOLUTION ORAL at 18:55

## 2020-08-15 RX ADMIN — Medication 0.25 MILLIGRAM(S): at 00:19

## 2020-08-15 NOTE — PROGRESS NOTE ADULT - PROBLEM SELECTOR PLAN 6
- sCr improving  - monitor renal function and serum bicarb - diastolic heart failure noted on echo; preserved EF, no evidence of pulmonary hypertension  - continue acetazolamide with goal net negative

## 2020-08-15 NOTE — PROGRESS NOTE ADULT - PROBLEM SELECTOR PLAN 1
multifactorial in the setting of underlying lung disease, cardiovascular dysfunction, obesity, and deconditioning

## 2020-08-15 NOTE — PROGRESS NOTE ADULT - PROBLEM SELECTOR PLAN 2
- Patient has very severe COPD with PFTs from 2018 showing an FEV1/FVC of 27%, with an FEV1: 27% and a DLCO: 23  - Cont steroid taper per pulm recs  - 20mg x 2 days then 10mg x 3 days then OFF   - Cont home azithromycin 250mg M/W/F  - patient needs to have continued followup with her lung transplant team at [facility]. Given the severity of her prior PFTs she is likely to be dyspneic all the time  - continue supplemental O2 with goal O2 sat > 88% - she does NOT need to be at 100%  - continue bronchodilator therapy

## 2020-08-15 NOTE — PROGRESS NOTE ADULT - PROBLEM SELECTOR PLAN 9
- DVT proph - on AC  - GI proph  - Maintain O2 sat > 88%  - Incentive spirometer and acapella  - OOB and PT as able - rate controlled  - continue anticoagulation, cardizem

## 2020-08-15 NOTE — PROGRESS NOTE ADULT - ASSESSMENT
61 y/o F with PMH of advanced COPD on home oxygen 4L NC, AF on Eliquis, CAD s/p PCI presents with worsening dyspnea, admitted for management of COPD exacerbation c/b fluid overload

## 2020-08-15 NOTE — PROGRESS NOTE ADULT - SUBJECTIVE AND OBJECTIVE BOX
CARDIOLOGY FOLLOW UP NOTE - DR. THOMAS    Subjective:  pt sleeping on bipap  denies chest pain, inc dyspnea, palpitations, dizziness  ROS: otherwise negative   overnight events:      PHYSICAL EXAM:  T(C): 37.1 (08-15-20 @ 10:10), Max: 37.1 (08-15-20 @ 10:10)  HR: 80 (08-15-20 @ 10:10) (65 - 96)  BP: 134/74 (08-15-20 @ 10:10) (111/72 - 145/70)  RR: 18 (08-15-20 @ 10:10) (18 - 20)  SpO2: 96% (08-15-20 @ 10:10) (96% - 100%)  Wt(kg): --  I&O's Summary    14 Aug 2020 07:  -  15 Aug 2020 07:00  --------------------------------------------------------  IN: 350 mL / OUT: 3025 mL / NET: -2675 mL    15 Aug 2020 07:  -  15 Aug 2020 13:26  --------------------------------------------------------  IN: 240 mL / OUT: 0 mL / NET: 240 mL      Daily     Daily Weight in k.2 (15 Aug 2020 06:34)    Appearance: Normal	  Cardiovascular: Normal S1 S2,RRR, No JVD, No murmurs  Respiratory: Lungs clear to auscultation	  Gastrointestinal:  Soft, Non-tender, + BS	  Extremities: Normal range of motion edema b/l ++  blister      Home Medications:  apixaban 5 mg oral tablet: 1 tab(s) orally every 12 hours (01 Aug 2020 21:52)  aspirin 81 mg oral delayed release tablet: 1 tab(s) orally once a day (01 Aug 2020 21:52)  CoQ10: 200 milligram(s) orally once a day (01 Aug 2020 21:52)  metFORMIN 500 mg oral tablet: 1 tab(s) orally 2 times a day (01 Aug 2020 21:52)  Multiple Vitamins oral tablet: 1 tab(s) orally once a day (01 Aug 2020 21:52)  nitroglycerin 0.4 mg sublingual tablet: 1 tab(s) sublingual every 5 minutes, As Needed (01 Aug 2020 21:)  Onglyza 5 mg oral tablet: 1 tab(s) orally once a day (01 Aug 2020 21:)  pantoprazole 40 mg oral delayed release tablet: 1 tab(s) orally once a day (before a meal) (01 Aug 2020 21:)  predniSONE 20 mg oral tablet: 2 tab(s) orally once a day (01 Aug 2020 21:)  rosuvastatin 20 mg oral tablet: 1 tab(s) orally once a day (01 Aug 2020 21:)  Singulair 10 mg oral tablet: 1 tab(s) orally once a day (in the evening) (01 Aug 2020 21:)  Spiriva 18 mcg inhalation capsule: 1 cap(s) inhaled once a day (01 Aug 2020 21:)  Symbicort 160 mcg-4.5 mcg/inh inhalation aerosol: 2 puff(s) inhaled 2 times a day (01 Aug 2020 21:52)  theophylline 400 mg/24 hours oral tablet, extended release: 1 tab(s) orally once a day (01 Aug 2020 21:)  Ventolin HFA 90 mcg/inh inhalation aerosol: 2 puff(s) inhaled 2 times a day (01 Aug 2020 21:)  Vitamin D3 2000 intl units oral tablet: 1 tab(s) orally once a day (01 Aug 2020 21:)      MEDICATIONS  (STANDING):  acetaminophen   Tablet .. 975 milliGRAM(s) Oral once  acetaZOLAMIDE Injectable 250 milliGRAM(s) IV Push <User Schedule>  albuterol/ipratropium for Nebulization 3 milliLiter(s) Nebulizer every 6 hours  apixaban 5 milliGRAM(s) Oral every 12 hours  aspirin enteric coated 81 milliGRAM(s) Oral daily  atorvastatin 80 milliGRAM(s) Oral at bedtime  azithromycin   Tablet 250 milliGRAM(s) Oral <User Schedule>  Biotene Dry Mouth Oral Rinse 5 milliLiter(s) Swish and Spit two times a day  bisacodyl Suppository 10 milliGRAM(s) Rectal daily  budesonide 160 MICROgram(s)/formoterol 4.5 MICROgram(s) Inhaler 2 Puff(s) Inhalation two times a day  chlorhexidine 2% Cloths 1 Application(s) Topical daily  cholecalciferol 2000 Unit(s) Oral daily  dextrose 5%. 1000 milliLiter(s) (50 mL/Hr) IV Continuous <Continuous>  dextrose 50% Injectable 25 Gram(s) IV Push once  diltiazem    milliGRAM(s) Oral daily  insulin glargine Injectable (LANTUS) 20 Unit(s) SubCutaneous at bedtime  insulin lispro (HumaLOG) corrective regimen sliding scale   SubCutaneous three times a day before meals  insulin lispro (HumaLOG) corrective regimen sliding scale   SubCutaneous at bedtime  insulin lispro Injectable (HumaLOG) 10 Unit(s) SubCutaneous three times a day with meals  lactulose Syrup 15 Gram(s) Oral two times a day  montelukast 10 milliGRAM(s) Oral daily  multivitamin 1 Tablet(s) Oral daily  pantoprazole    Tablet 40 milliGRAM(s) Oral before breakfast  polyethylene glycol 3350 17 Gram(s) Oral daily  senna 2 Tablet(s) Oral at bedtime  theophylline ER (24 Hour) 400 milliGRAM(s) Oral daily  tiotropium 18 MICROgram(s) Capsule 1 Capsule(s) Inhalation daily      TELEMETRY: 	    ECG:  	  RADIOLOGY:   DIAGNOSTIC TESTING:  [ ] Echocardiogram:  [ ] Catheterization:  [ ] Stress Test:    OTHER: 	    LABS:	 	    CARDIAC MARKERS:                                8.5    12.79 )-----------( 227      ( 15 Aug 2020 09:36 )             29.2     08-15    138  |  96  |  39<H>  ----------------------------<  152<H>  4.5   |  36<H>  |  1.29    Ca    9.2      15 Aug 2020 09:36      proBNP:     Lipid Profile:   HgA1c:     Creatinine, Serum: 1.29 mg/dL (08-15-20 @ 09:36)  Creatinine, Serum: 1.39 mg/dL (20 @ 09:28)  Creatinine, Serum: 1.32 mg/dL (20 @ 09:05)

## 2020-08-15 NOTE — PROGRESS NOTE ADULT - ATTENDING COMMENTS
Patient is able to talk in full sentences and currently stable from a pulmonary standpoint. She has quite extensive obstructive lung disease and is being evaluated at the [facility]. No acute pulmonary interventions at this time.    - Would suggest tapering her steroids to off.  - She needs continued volume removal with diuresis as she appears volume overloaded. She would benefit from being net negative so please monitor I/O carefully.   - Patient has a primary respiratory acidosis with a secondary metabolic alkalosis. Her pH and serum bicarb should be monitored closely. Her serum bicarb was previously in the 40s and at this point it is less than that. I do think furosemide or bumex would be a better diuretic as long as her serum bicarb is less than 40 and/or her pH remains below 7.45.    Pulmonary to remain available. Please call with any questions.

## 2020-08-15 NOTE — PROGRESS NOTE ADULT - SUBJECTIVE AND OBJECTIVE BOX
[facility] Division of Hospital Medicine  Connie Soliman MD  Pager (M-F, 4N-5P): [phone]  Other Times:  [phone]    Patient is a 60y old  Female who presents with a chief complaint of 60F p/w generalized weakness and difficulty walking (15 Aug 2020 13:26)      SUBJECTIVE / OVERNIGHT EVENTS:  Pt was seen and examined at bedside. Yesterday was reported to be hypoglycemic to 40s which improved with dextrose administration. Today pt reports usual complaints of SOB and inability to walk due to being fluid overloaded.    ADDITIONAL REVIEW OF SYSTEMS:  CONSTITUTIONAL: +weakness, no fevers or chills  EYES/ENT: No visual changes;  No vertigo or throat pain   NECK: No pain or stiffness  RESPIRATORY: No cough, wheezing, hemoptysis; +shortness of breath  CARDIOVASCULAR: No chest pain or palpitations  GASTROINTESTINAL: No abdominal or epigastric pain. No nausea, vomiting, or hematemesis; No diarrhea, +constipation. No melena or hematochezia.  GENITOURINARY: No dysuria, frequency or hematuria  NEUROLOGICAL: No numbness   SKIN: No itching, rashes      MEDICATIONS  (STANDING):  acetaminophen   Tablet .. 975 milliGRAM(s) Oral once  acetaZOLAMIDE Injectable 250 milliGRAM(s) IV Push <User Schedule>  albuterol/ipratropium for Nebulization 3 milliLiter(s) Nebulizer every 6 hours  apixaban 5 milliGRAM(s) Oral every 12 hours  aspirin enteric coated 81 milliGRAM(s) Oral daily  atorvastatin 80 milliGRAM(s) Oral at bedtime  azithromycin   Tablet 250 milliGRAM(s) Oral <User Schedule>  Biotene Dry Mouth Oral Rinse 5 milliLiter(s) Swish and Spit two times a day  bisacodyl Suppository 10 milliGRAM(s) Rectal daily  budesonide 160 MICROgram(s)/formoterol 4.5 MICROgram(s) Inhaler 2 Puff(s) Inhalation two times a day  buMETAnide 1 milliGRAM(s) Oral daily  chlorhexidine 2% Cloths 1 Application(s) Topical daily  cholecalciferol 2000 Unit(s) Oral daily  dextrose 5%. 1000 milliLiter(s) (50 mL/Hr) IV Continuous <Continuous>  dextrose 50% Injectable 25 Gram(s) IV Push once  diltiazem    milliGRAM(s) Oral daily  insulin glargine Injectable (LANTUS) 20 Unit(s) SubCutaneous at bedtime  insulin lispro (HumaLOG) corrective regimen sliding scale   SubCutaneous three times a day before meals  insulin lispro (HumaLOG) corrective regimen sliding scale   SubCutaneous at bedtime  insulin lispro Injectable (HumaLOG) 10 Unit(s) SubCutaneous three times a day with meals  lactulose Syrup 15 Gram(s) Oral two times a day  montelukast 10 milliGRAM(s) Oral daily  multivitamin 1 Tablet(s) Oral daily  pantoprazole    Tablet 40 milliGRAM(s) Oral before breakfast  polyethylene glycol 3350 17 Gram(s) Oral daily  senna 2 Tablet(s) Oral at bedtime  theophylline ER (24 Hour) 400 milliGRAM(s) Oral daily  tiotropium 18 MICROgram(s) Capsule 1 Capsule(s) Inhalation daily    MEDICATIONS  (PRN):  ALPRAZolam 0.25 milliGRAM(s) Oral two times a day PRN Anxiety  glucagon  Injectable 1 milliGRAM(s) IntraMuscular once PRN Glucose LESS THAN 70 milligrams/deciliter  guaiFENesin   Syrup  (Sugar-Free) 100 milliGRAM(s) Oral every 6 hours PRN Cough      CAPILLARY BLOOD GLUCOSE      POCT Blood Glucose.: 150 mg/dL (15 Aug 2020 12:47)  POCT Blood Glucose.: 154 mg/dL (15 Aug 2020 08:32)  POCT Blood Glucose.: 138 mg/dL (15 Aug 2020 06:32)  POCT Blood Glucose.: 148 mg/dL (14 Aug 2020 21:43)  POCT Blood Glucose.: 142 mg/dL (14 Aug 2020 19:16)  POCT Blood Glucose.: 60 mg/dL (14 Aug 2020 18:54)  POCT Blood Glucose.: 48 mg/dL (14 Aug 2020 18:28)  POCT Blood Glucose.: 47 mg/dL (14 Aug 2020 18:26)    I&O's Summary    14 Aug 2020 07:01  -  15 Aug 2020 07:00  --------------------------------------------------------  IN: 350 mL / OUT: 3025 mL / NET: -2675 mL    15 Aug 2020 07:01  -  15 Aug 2020 16:32  --------------------------------------------------------  IN: 440 mL / OUT: 0 mL / NET: 440 mL        PHYSICAL EXAM:  Vital Signs Last 24 Hrs  T(C): 37 (15 Aug 2020 13:57), Max: 37.1 (15 Aug 2020 10:10)  T(F): 98.6 (15 Aug 2020 13:57), Max: 98.8 (15 Aug 2020 10:10)  HR: 88 (15 Aug 2020 14:50) (65 - 96)  BP: 135/70 (15 Aug 2020 13:57) (111/72 - 145/70)  BP(mean): --  RR: 18 (15 Aug 2020 13:57) (18 - 20)  SpO2: 95% (15 Aug 2020 14:50) (95% - 100%)    CONSTITUTIONAL: obese, no acute distress  EYES: PERRLA; conjunctiva and sclera clear  ENMT: Moist oral mucosa, no pharyngeal injection or exudates  NECK: Supple  RESPIRATORY: Decreased BS throughout, NC in place  CARDIOVASCULAR: Regular rate and rhythm, normal S1 and S2, +murmur, 3+ bilateral lower extremity edema with large tense water bullae in L foot  ABDOMEN: Nontender to palpation, normoactive bowel sounds, no rebound/guarding  MUSCULOSKELETAL:  no joint swelling or tenderness to palpation  PSYCH: A+O to person, place, and time; affect angry  NEUROLOGY: no gross sensory deficits   SKIN: No rashes    LABS:                        8.5    12.79 )-----------( 227      ( 15 Aug 2020 09:36 )             29.2     08-15    138  |  96  |  39<H>  ----------------------------<  152<H>  4.5   |  36<H>  |  1.29    Ca    9.2      15 Aug 2020 09:36

## 2020-08-15 NOTE — PROGRESS NOTE ADULT - SUBJECTIVE AND OBJECTIVE BOX
CHIEF COMPLAINT:     Interval Events:    REVIEW OF SYSTEMS:  Constitutional:   Eyes:  ENT:  CV:  Resp:  GI:  :  MSK:  Integumentary:  Neurological:  Psychiatric:  Endocrine:  Hematologic/Lymphatic:  Allergic/Immunologic:  [ ] All other systems negative  [ ] Unable to assess ROS because ________    OBJECTIVE:  ICU Vital Signs Last 24 Hrs  T(C): 36.7 (15 Aug 2020 17:49), Max: 37.1 (15 Aug 2020 10:10)  T(F): 98.1 (15 Aug 2020 17:49), Max: 98.8 (15 Aug 2020 10:10)  HR: 71 (15 Aug 2020 17:49) (65 - 96)  BP: 131/64 (15 Aug 2020 17:49) (111/72 - 145/70)  BP(mean): --  ABP: --  ABP(mean): --  RR: 18 (15 Aug 2020 17:49) (18 - 20)  SpO2: 100% (15 Aug 2020 17:49) (95% - 100%)        08-14 @ 07:01  -  08-15 @ 07:00  --------------------------------------------------------  IN: 350 mL / OUT: 3025 mL / NET: -2675 mL    08-15 @ 07:01  -  08-15 @ 18:54  --------------------------------------------------------  IN: 440 mL / OUT: 0 mL / NET: 440 mL      CAPILLARY BLOOD GLUCOSE      POCT Blood Glucose.: 87 mg/dL (15 Aug 2020 18:41)      PHYSICAL EXAM:  General:   HEENT:   Lymph Nodes:  Neck:   Respiratory:   Cardiovascular:   Abdomen:   Extremities:   Skin:   Neurological:  Psychiatry:    HOSPITAL MEDICATIONS:  MEDICATIONS  (STANDING):  acetaminophen   Tablet .. 975 milliGRAM(s) Oral once  acetaZOLAMIDE Injectable 250 milliGRAM(s) IV Push <User Schedule>  albuterol/ipratropium for Nebulization 3 milliLiter(s) Nebulizer every 6 hours  apixaban 5 milliGRAM(s) Oral every 12 hours  aspirin enteric coated 81 milliGRAM(s) Oral daily  atorvastatin 80 milliGRAM(s) Oral at bedtime  azithromycin   Tablet 250 milliGRAM(s) Oral <User Schedule>  Biotene Dry Mouth Oral Rinse 5 milliLiter(s) Swish and Spit two times a day  bisacodyl Suppository 10 milliGRAM(s) Rectal daily  budesonide 160 MICROgram(s)/formoterol 4.5 MICROgram(s) Inhaler 2 Puff(s) Inhalation two times a day  buMETAnide 1 milliGRAM(s) Oral daily  chlorhexidine 2% Cloths 1 Application(s) Topical daily  cholecalciferol 2000 Unit(s) Oral daily  dextrose 5%. 1000 milliLiter(s) (50 mL/Hr) IV Continuous <Continuous>  dextrose 50% Injectable 25 Gram(s) IV Push once  diltiazem    milliGRAM(s) Oral daily  insulin glargine Injectable (LANTUS) 20 Unit(s) SubCutaneous at bedtime  insulin lispro (HumaLOG) corrective regimen sliding scale   SubCutaneous three times a day before meals  insulin lispro (HumaLOG) corrective regimen sliding scale   SubCutaneous at bedtime  insulin lispro Injectable (HumaLOG) 10 Unit(s) SubCutaneous three times a day with meals  lactulose Syrup 15 Gram(s) Oral two times a day  montelukast 10 milliGRAM(s) Oral daily  multivitamin 1 Tablet(s) Oral daily  pantoprazole    Tablet 40 milliGRAM(s) Oral before breakfast  polyethylene glycol 3350 17 Gram(s) Oral daily  senna 2 Tablet(s) Oral at bedtime  theophylline ER (24 Hour) 400 milliGRAM(s) Oral daily  tiotropium 18 MICROgram(s) Capsule 1 Capsule(s) Inhalation daily    MEDICATIONS  (PRN):  ALPRAZolam 0.25 milliGRAM(s) Oral two times a day PRN Anxiety  glucagon  Injectable 1 milliGRAM(s) IntraMuscular once PRN Glucose LESS THAN 70 milligrams/deciliter  guaiFENesin   Syrup  (Sugar-Free) 100 milliGRAM(s) Oral every 6 hours PRN Cough      LABS:                        8.5    12.79 )-----------( 227      ( 15 Aug 2020 09:36 )             29.2     08-15    138  |  96  |  39<H>  ----------------------------<  152<H>  4.5   |  36<H>  |  1.29    Ca    9.2      15 Aug 2020 09:36    < from: Xray Chest 1 View- PORTABLE-Routine (08.01.20 @ 16:44) >    EXAM:  XR CHEST PORTABLE ROUTINE 1V                            PROCEDURE DATE:  08/01/2020            INTERPRETATION:  CLINICAL INFORMATION: Screening, history COPD.    TECHNIQUE: Single portable view of the chest.    COMPARISON: 12/11/2019.      IMPRESSION:    The lungs are clear.  Heart size cannot be accurately assessed in this projection.  No acute bone abnormality.    < end of copied text >      < from: CT Chest No Cont (12.11.19 @ 18:06) >  EXAM:  CT CHEST                            ct chest   PROCEDURE DATE:  12/11/2019    INTERPRETATION:  CLINICAL INFORMATION: Shortness of breath.    COMPARISON: Chest radiograph from same day. CT chest 12/4/2018.    PROCEDURE:   CT of the Chest was performed without intravenous contrast.  Sagittal and coronal reformats were performed.      FINDINGS:    LUNGS AND AIRWAYS: Patent central airways. No infectious consolidation. 1   cm right lower lobe subpleural nodule (2:96). Centrilobular emphysema.    PLEURA: No pleural effusion.    MEDIASTINUM AND LILIANA: Small mediastinal lymph nodes.    VESSELS: Coronary atherosclerosis.    HEART: Heart size is normal. No pericardial effusion.    CHEST WALL AND LOWER NECK: Within normal limits.    VISUALIZED UPPER ABDOMEN: Within normal limits.    BONES: Degenerative changes.    IMPRESSION:     No infectious consolidation.    1 cm right lower lobe subpleural nodule or atelectatic focus. Follow-up   CT chest in 3 months could be considered.    < end of copied text >          MICROBIOLOGY:     RADIOLOGY:  [ ] Reviewed and interpreted by me    PULMONARY FUNCTION TESTS:    EKG: CHIEF COMPLAINT: WEAKNESS   Patient is a 60y old  Female who presents with a chief complaint of 60F p/w generalized weakness and difficulty walking (14 Aug 2020 13:27), COPD, DM, NILA , CHF     Physical Exam:   · Constitutional	Well-developed, well nourished  · Eyes	EOMI; PERRL; no drainage or redness  · ENMT	No oral lesions; no gross abnormalities  · Neck	No bruits; no thyromegaly or nodules  · Back	No deformity or limitation of movement  · Respiratory	detailed exam  · Respiratory Comments	Decreased breath sounds at bases  · Cardiovascular	Regular rate & rhythm, normal S1, S2; no murmurs, gallops or rubs; no S3, S4  · Gastrointestinal	Soft, non-tender, no hepatosplenomegaly, normal bowel sounds  · Extremities	detailed exam  · Extremities Comments	2+ LE edema  · Vascular	Equal and normal pulses (carotid, femoral, dorsalis pedis)  · Neurological	Alert & oriented; no sensory, motor or coordination deficits, normal reflexes  · Skin	No lesions; no rash  · Lymph Nodes	No lymphadedenopathy  · Musculoskeletal	No joint pain, swelling or deformity; no limitation of movement  · Psychiatric	detailed exam  · Psychiatric Comments	Belligerent, combative    OBJECTIVE:  ICU Vital Signs Last 24 Hrs  T(C): 36.7 (15 Aug 2020 17:49), Max: 37.1 (15 Aug 2020 10:10)  T(F): 98.1 (15 Aug 2020 17:49), Max: 98.8 (15 Aug 2020 10:10)  HR: 71 (15 Aug 2020 17:49) (65 - 96)  BP: 131/64 (15 Aug 2020 17:49) (111/72 - 145/70)  BP(mean): --  ABP: --  ABP(mean): --  RR: 18 (15 Aug 2020 17:49) (18 - 20)  SpO2: 100% (15 Aug 2020 17:49) (95% - 100%)        08-14 @ 07:01  -  08-15 @ 07:00  --------------------------------------------------------  IN: 350 mL / OUT: 3025 mL / NET: -2675 mL    08-15 @ 07:01  -  08-15 @ 18:54  --------------------------------------------------------  IN: 440 mL / OUT: 0 mL / NET: 440 mL      CAPILLARY BLOOD GLUCOSE      POCT Blood Glucose.: 87 mg/dL (15 Aug 2020 18:41)      PHYSICAL EXAM:  General:   HEENT:   Lymph Nodes:  Neck:   Respiratory:   Cardiovascular:   Abdomen:   Extremities:   Skin:   Neurological:  Psychiatry:    HOSPITAL MEDICATIONS:  MEDICATIONS  (STANDING):  acetaminophen   Tablet .. 975 milliGRAM(s) Oral once  acetaZOLAMIDE Injectable 250 milliGRAM(s) IV Push <User Schedule>  albuterol/ipratropium for Nebulization 3 milliLiter(s) Nebulizer every 6 hours  apixaban 5 milliGRAM(s) Oral every 12 hours  aspirin enteric coated 81 milliGRAM(s) Oral daily  atorvastatin 80 milliGRAM(s) Oral at bedtime  azithromycin   Tablet 250 milliGRAM(s) Oral <User Schedule>  Biotene Dry Mouth Oral Rinse 5 milliLiter(s) Swish and Spit two times a day  bisacodyl Suppository 10 milliGRAM(s) Rectal daily  budesonide 160 MICROgram(s)/formoterol 4.5 MICROgram(s) Inhaler 2 Puff(s) Inhalation two times a day  buMETAnide 1 milliGRAM(s) Oral daily  chlorhexidine 2% Cloths 1 Application(s) Topical daily  cholecalciferol 2000 Unit(s) Oral daily  dextrose 5%. 1000 milliLiter(s) (50 mL/Hr) IV Continuous <Continuous>  dextrose 50% Injectable 25 Gram(s) IV Push once  diltiazem    milliGRAM(s) Oral daily  insulin glargine Injectable (LANTUS) 20 Unit(s) SubCutaneous at bedtime  insulin lispro (HumaLOG) corrective regimen sliding scale   SubCutaneous three times a day before meals  insulin lispro (HumaLOG) corrective regimen sliding scale   SubCutaneous at bedtime  insulin lispro Injectable (HumaLOG) 10 Unit(s) SubCutaneous three times a day with meals  lactulose Syrup 15 Gram(s) Oral two times a day  montelukast 10 milliGRAM(s) Oral daily  multivitamin 1 Tablet(s) Oral daily  pantoprazole    Tablet 40 milliGRAM(s) Oral before breakfast  polyethylene glycol 3350 17 Gram(s) Oral daily  senna 2 Tablet(s) Oral at bedtime  theophylline ER (24 Hour) 400 milliGRAM(s) Oral daily  tiotropium 18 MICROgram(s) Capsule 1 Capsule(s) Inhalation daily    MEDICATIONS  (PRN):  ALPRAZolam 0.25 milliGRAM(s) Oral two times a day PRN Anxiety  glucagon  Injectable 1 milliGRAM(s) IntraMuscular once PRN Glucose LESS THAN 70 milligrams/deciliter  guaiFENesin   Syrup  (Sugar-Free) 100 milliGRAM(s) Oral every 6 hours PRN Cough      LABS:                        8.5    12.79 )-----------( 227      ( 15 Aug 2020 09:36 )             29.2     08-15    138  |  96  |  39<H>  ----------------------------<  152<H>  4.5   |  36<H>  |  1.29    Ca    9.2      15 Aug 2020 09:36    < from: Xray Chest 1 View- PORTABLE-Routine (08.01.20 @ 16:44) >    EXAM:  XR CHEST PORTABLE ROUTINE 1V                            PROCEDURE DATE:  08/01/2020            INTERPRETATION:  CLINICAL INFORMATION: Screening, history COPD.    TECHNIQUE: Single portable view of the chest.    COMPARISON: 12/11/2019.      IMPRESSION:    The lungs are clear.  Heart size cannot be accurately assessed in this projection.  No acute bone abnormality.    < end of copied text >      < from: CT Chest No Cont (12.11.19 @ 18:06) >  EXAM:  CT CHEST                            ct chest   PROCEDURE DATE:  12/11/2019    INTERPRETATION:  CLINICAL INFORMATION: Shortness of breath.    COMPARISON: Chest radiograph from same day. CT chest 12/4/2018.    PROCEDURE:   CT of the Chest was performed without intravenous contrast.  Sagittal and coronal reformats were performed.      FINDINGS:    LUNGS AND AIRWAYS: Patent central airways. No infectious consolidation. 1   cm right lower lobe subpleural nodule (2:96). Centrilobular emphysema.    PLEURA: No pleural effusion.    MEDIASTINUM AND LILIANA: Small mediastinal lymph nodes.    VESSELS: Coronary atherosclerosis.    HEART: Heart size is normal. No pericardial effusion.    CHEST WALL AND LOWER NECK: Within normal limits.    VISUALIZED UPPER ABDOMEN: Within normal limits.    BONES: Degenerative changes.    IMPRESSION:     No infectious consolidation.    1 cm right lower lobe subpleural nodule or atelectatic focus. Follow-up   CT chest in 3 months could be considered.    < end of copied text >          MICROBIOLOGY:     RADIOLOGY:  [ ] Reviewed and interpreted by me    PULMONARY FUNCTION TESTS:    EKG: CHIEF COMPLAINT: WEAKNESS   Patient is a 60y old  Female who presents with a chief complaint of 60F p/w generalized weakness and difficulty walking (14 Aug 2020 13:27), COPD, DM, NILA , CHF      [x] INITIAL CONSULT, H&P, FAMILY HISTORY and PAST MEDICAL AND SURGICAL HISTORY REVIEWED      OVERNIGHT EVENTS or CHANGES TO HPI:   Patient uses 3L O2 at home - currently on 4L O2 here and saturating %  Patient with significant volume overload - has been on/off Lasix due to renal function but reportedly making good urine. Now getting Diamox.  Patient using BIPAP 15/5 - with good results reportedly      ========================REVIEW OF SYSTEMS========================  CONSTITUTIONAL: Frustrated, LE edema, pain in back  CARDIOVASCULAR: Denies chest pain or palpitations  PULMONARY: Dyspnea, no hemoptysis  [x] REMAINING REVIEW OF SYSTEMS NEGATIVE  [] UNABLE TO OBTAIN REVIEW OF SYSTEMS DUE TO _______________         Physical Exam:   · Constitutional	Well-developed, well nourished  · Eyes	EOMI; PERRL; no drainage or redness  · ENMT	No oral lesions; no gross abnormalities  · Neck	No bruits; no thyromegaly or nodules  · Back	No deformity or limitation of movement  · Respiratory	detailed exam  · Respiratory Comments	Decreased breath sounds at bases  · Cardiovascular	Regular rate & rhythm, normal S1, S2; no murmurs, gallops or rubs; no S3, S4  · Gastrointestinal	Soft, non-tender, no hepatosplenomegaly, normal bowel sounds  · Extremities	detailed exam  · Extremities Comments	2+ LE edema  · Vascular	Equal and normal pulses (carotid, femoral, dorsalis pedis)  · Neurological	Alert & oriented; no sensory, motor or coordination deficits, normal reflexes  · Skin	No lesions; no rash  · Lymph Nodes	No lymphadedenopathy  · Musculoskeletal	No joint pain, swelling or deformity; no limitation of movement  · Psychiatric	detailed exam  · Psychiatric Comments	Belligerent, combative    OBJECTIVE:  ICU Vital Signs Last 24 Hrs  T(C): 36.7 (15 Aug 2020 17:49), Max: 37.1 (15 Aug 2020 10:10)  T(F): 98.1 (15 Aug 2020 17:49), Max: 98.8 (15 Aug 2020 10:10)  HR: 71 (15 Aug 2020 17:49) (65 - 96)  BP: 131/64 (15 Aug 2020 17:49) (111/72 - 145/70)  BP(mean): --  ABP: --  ABP(mean): --  RR: 18 (15 Aug 2020 17:49) (18 - 20)  SpO2: 100% (15 Aug 2020 17:49) (95% - 100%)        08-14 @ 07:01  -  08-15 @ 07:00  --------------------------------------------------------  IN: 350 mL / OUT: 3025 mL / NET: -2675 mL    08-15 @ 07:01  -  08-15 @ 18:54  --------------------------------------------------------  IN: 440 mL / OUT: 0 mL / NET: 440 mL      CAPILLARY BLOOD GLUCOSE      POCT Blood Glucose.: 87 mg/dL (15 Aug 2020 18:41)      PHYSICAL EXAM:  General:   HEENT:   Lymph Nodes:  Neck:   Respiratory:   Cardiovascular:   Abdomen:   Extremities:   Skin:   Neurological:  Psychiatry:    HOSPITAL MEDICATIONS:  MEDICATIONS  (STANDING):  acetaminophen   Tablet .. 975 milliGRAM(s) Oral once  acetaZOLAMIDE Injectable 250 milliGRAM(s) IV Push <User Schedule>  albuterol/ipratropium for Nebulization 3 milliLiter(s) Nebulizer every 6 hours  apixaban 5 milliGRAM(s) Oral every 12 hours  aspirin enteric coated 81 milliGRAM(s) Oral daily  atorvastatin 80 milliGRAM(s) Oral at bedtime  azithromycin   Tablet 250 milliGRAM(s) Oral <User Schedule>  Biotene Dry Mouth Oral Rinse 5 milliLiter(s) Swish and Spit two times a day  bisacodyl Suppository 10 milliGRAM(s) Rectal daily  budesonide 160 MICROgram(s)/formoterol 4.5 MICROgram(s) Inhaler 2 Puff(s) Inhalation two times a day  buMETAnide 1 milliGRAM(s) Oral daily  chlorhexidine 2% Cloths 1 Application(s) Topical daily  cholecalciferol 2000 Unit(s) Oral daily  dextrose 5%. 1000 milliLiter(s) (50 mL/Hr) IV Continuous <Continuous>  dextrose 50% Injectable 25 Gram(s) IV Push once  diltiazem    milliGRAM(s) Oral daily  insulin glargine Injectable (LANTUS) 20 Unit(s) SubCutaneous at bedtime  insulin lispro (HumaLOG) corrective regimen sliding scale   SubCutaneous three times a day before meals  insulin lispro (HumaLOG) corrective regimen sliding scale   SubCutaneous at bedtime  insulin lispro Injectable (HumaLOG) 10 Unit(s) SubCutaneous three times a day with meals  lactulose Syrup 15 Gram(s) Oral two times a day  montelukast 10 milliGRAM(s) Oral daily  multivitamin 1 Tablet(s) Oral daily  pantoprazole    Tablet 40 milliGRAM(s) Oral before breakfast  polyethylene glycol 3350 17 Gram(s) Oral daily  senna 2 Tablet(s) Oral at bedtime  theophylline ER (24 Hour) 400 milliGRAM(s) Oral daily  tiotropium 18 MICROgram(s) Capsule 1 Capsule(s) Inhalation daily    MEDICATIONS  (PRN):  ALPRAZolam 0.25 milliGRAM(s) Oral two times a day PRN Anxiety  glucagon  Injectable 1 milliGRAM(s) IntraMuscular once PRN Glucose LESS THAN 70 milligrams/deciliter  guaiFENesin   Syrup  (Sugar-Free) 100 milliGRAM(s) Oral every 6 hours PRN Cough      LABS:                        8.5    12.79 )-----------( 227      ( 15 Aug 2020 09:36 )             29.2     08-15    138  |  96  |  39<H>  ----------------------------<  152<H>  4.5   |  36<H>  |  1.29    Ca    9.2      15 Aug 2020 09:36    < from: Xray Chest 1 View- PORTABLE-Routine (08.01.20 @ 16:44) >    EXAM:  XR CHEST PORTABLE ROUTINE 1V                            PROCEDURE DATE:  08/01/2020            INTERPRETATION:  CLINICAL INFORMATION: Screening, history COPD.    TECHNIQUE: Single portable view of the chest.    COMPARISON: 12/11/2019.      IMPRESSION:    The lungs are clear.  Heart size cannot be accurately assessed in this projection.  No acute bone abnormality.    < end of copied text >      < from: CT Chest No Cont (12.11.19 @ 18:06) >  EXAM:  CT CHEST                            ct chest   PROCEDURE DATE:  12/11/2019    INTERPRETATION:  CLINICAL INFORMATION: Shortness of breath.    COMPARISON: Chest radiograph from same day. CT chest 12/4/2018.    PROCEDURE:   CT of the Chest was performed without intravenous contrast.  Sagittal and coronal reformats were performed.      FINDINGS:    LUNGS AND AIRWAYS: Patent central airways. No infectious consolidation. 1   cm right lower lobe subpleural nodule (2:96). Centrilobular emphysema.    PLEURA: No pleural effusion.    MEDIASTINUM AND LILIANA: Small mediastinal lymph nodes.    VESSELS: Coronary atherosclerosis.    HEART: Heart size is normal. No pericardial effusion.    CHEST WALL AND LOWER NECK: Within normal limits.    VISUALIZED UPPER ABDOMEN: Within normal limits.    BONES: Degenerative changes.    IMPRESSION:     No infectious consolidation.    1 cm right lower lobe subpleural nodule or atelectatic focus. Follow-up   CT chest in 3 months could be considered.    < end of copied text >          MICROBIOLOGY:     RADIOLOGY:  [ ] Reviewed and interpreted by me    PULMONARY FUNCTION TESTS:    EKG:

## 2020-08-15 NOTE — PROGRESS NOTE ADULT - PROBLEM SELECTOR PLAN 4
- nutrition evaluation and weight loss -current insulin regimen: lantus decreased to 20U at bedtime and humalog decreased to 10U with meals; ISS  -monitor FS closely with steroid taper

## 2020-08-15 NOTE — PROGRESS NOTE ADULT - ASSESSMENT
60F w COPD on 3LNC (?pending transplant list at [facility]), former smoker, CAD s/p stent (2016), afib on eliquis, uncontrolled DM2, raynaud phenomenon and recent hospitalization at [facility] for altered mental status and [facility] aw COPD exacerbation, LE swelling, weakness.     1. Acute COPD exacerbation- On PO Prednisone, Duoneb, Symbicort, Spiriva and Theophylline. Overnight and prn Bipap. Pt comfortable off Bipap and able to speak in full sentences.     Pt belligerent and uncooperative during hospitalization, has refused to see many doctors including 2 pulmonary teams. Unwilling to engage in conversation and keep saying " You guys are doing nothing for me". Refusing to work with PT.     as been on PO Prednisone since 8/2, dose decreased     2. LE edema, likely acute diastolic CHF- Has been on Diamox with poor response. Restarted on IV Lasix, but developed worsening Cr and metabolic alkalosis, lasix held.------ Continue DIamox.    3. DM2- A1C 7.5. Steroid induced hyperglycemia. Continue Lantus and premeal insulin, dose decreased today as am fingersticks borderline low.     4. Paroxysmal a fib- Now in NSR. On Apixiban, Cardizem. 60F w COPD on 3LNC (?pending transplant list at [facility]), former smoker, CAD s/p stent (2016), afib on eliquis, uncontrolled DM2, raynaud phenomenon and recent hospitalization at [facility] for altered mental status and [facility] aw COPD exacerbation, LE swelling, weakness.     1. Acute COPD exacerbation- On PO Prednisone, Duoneb, Symbicort, Spiriva and Theophylline. Overnight and prn Bipap. Pt comfortable off Bipap and able to speak in full sentences.     Pt belligerent and uncooperative during hospitalization, has refused to see many doctors including 2 pulmonary teams. been on PO Prednisone since 8/2, dose decreased     2. LE edema, likely acute diastolic CHF- Has been on Diamox with poor response. Restarted on IV Lasix, but developed worsening Cr and metabolic alkalosis, lasix held.------ Continue DIamox.    3. DM2- A1C 7.5. Steroid induced hyperglycemia. Continue Lantus and premeal insulin, dose decreased today as am fingersticks borderline low.     4. Paroxysmal a fib- Now in NSR. On Apixiban, Cardizem.

## 2020-08-15 NOTE — PROGRESS NOTE ADULT - PROBLEM SELECTOR PLAN 7
- patient with a large left foot blister which looks like it is about to pop  - wound care evaluation  - reduction of steroids may help with water retention issues - sCr improving  - monitor renal function and serum bicarb

## 2020-08-15 NOTE — PROGRESS NOTE ADULT - PROBLEM SELECTOR PLAN 8
- rate controlled  - continue anticoagulation, cardizem - patient with a large left foot blister which looks like it is about to pop  - wound care evaluation  - reduction of steroids may help with water retention issues

## 2020-08-15 NOTE — PROGRESS NOTE ADULT - ATTENDING COMMENTS
pt seen and examined pt seen and examined  very difficult patient   Patient is able to talk in full sentences and currently stable from a pulmonary standpoint.   - contd nebs , Would suggest tapering her steroids to off. TRY BIPAP IF PT AGREEABLE   - She needs continued volume removal with diuresis as she appears volume overloaded. She would benefit from being net negative so please monitor I/O carefully.   --on anticoagulation ---a fib  please chesk TSH  ---REPEAT CT SCAN CHEST IF PT AGREEABLE    - Patient has a primary respiratory acidosis with a secondary metabolic alkalosis. Her pH and serum bicarb should be monitored closely. Her serum bicarb was previously in the 40s and at this point it is less than that. I do think furosemide or bumex would be a better diuretic as long as her serum bicarb is less than 40 and/or her pH remains below 7.45.--ON DIAMOX     Pulmonary to remain available. Please call with any questions. pt seen and examined  very difficult patient   Patient is able to talk in full sentences and currently stable from a pulmonary standpoint.   - contd nebs , Would suggest tapering her steroids to off. TRY BIPAP IF PT AGREEABLE   - She needs continued volume removal with diuresis as she appears volume overloaded. She would benefit from being net negative so please monitor I/O carefully.   --on anticoagulation ---a fib  PLEASE CHECK TSH  ---REPEAT CT SCAN CHEST IF PT AGREEABLE    - Patient has a primary respiratory acidosis with a secondary metabolic alkalosis. Her pH and serum bicarb should be monitored closely. Her serum bicarb was previously in the 40s and at this point it is less than that. I do think furosemide or bumex would be a better diuretic as long as her serum bicarb is less than 40 and/or her pH remains below 7.45.--ON DIAMOX     Pulmonary to remain available. Please call with any questions.

## 2020-08-15 NOTE — PROGRESS NOTE ADULT - PROBLEM SELECTOR PLAN 10
Transitions of Care Status:  1.  Name of PCP: Dr. Mathew  2.  PCP Contacted on Admission: [ ] Y    [ X] N    3.  PCP contacted at Discharge: [ ] Y    [ ] N    [ ] N/A  4.  Post-Discharge Appointment Date and Location:  5.  Summary of Handoff given to PCP:
Transitions of Care Status:  1.  Name of PCP: Dr. Mathew  2.  PCP Contacted on Admission: [ ] Y    [ X] N    3.  PCP contacted at Discharge: [ ] Y    [ ] N    [ ] N/A  4.  Post-Discharge Appointment Date and Location:  5.  Summary of Handoff given to PCP:
- c/w lasix 40mg PO   - c/w home diltiazem dosing  - outpatient chart documents patient is supposed to be on metolazone MWF but patient denies taking medication
- DVT proph - on AC  - GI proph  - Maintain O2 sat > 88%  - Incentive spirometer and acapella  - OOB and PT as able

## 2020-08-15 NOTE — PROGRESS NOTE ADULT - ASSESSMENT
EVAN 1/7/19: no ALINA thrombus, unable to assess LV sys fx  echo 12/7/18: EF 71%, nl LV sys fx, mild diastolic dysfx     a/p    60 year old female with hx of D CHF, HTN, CAD s/p PCI, Afib/ aflutter, s/p Aflutter Ablation 12/2019, COPD, DM2, Anxiety, , raynaud phenomenon presenting with weakness, SOB , hyperkalemia.      1. Dyspnea, Resp failure  -secondary to chronic lung disease, COPD, volume overload  -pulm f/u   -covid 19 negative  -no acs , cv stable   -ecg with known RBBB, new twi noted, hyperkalemia also noted on admission  -echo with normal LVEF, mild diastolic dysfunction   -On PO Prednisone, Duoneb, Symbicort, Spiriva and Theophylline.    2. CAD s/p PCI   -stable, no cp  -c/w ASA, statin  -echo noted above     3. Afib/aflutter s/p  Aflutter Ablation 12/2019  -in NSR, cw cardizem  mg daily  -CHADsVac=2, c/w eliquis     4. Acute on Chronic Diastolic CHF   -remains overloaded  -dyspnea secondary to copd, maxwell chf  -on bipap  -echo with normal lVEF   -Has been on Diamox with poor response  -Restarted on IV Lasix, but now with worsening Cr and metabolic alkalosis, lasix on hold   -retrial of loop diuretic with expected rise in creatinine  -start iv bumex 1 daily to see if more effective than lasix    -will closely monitor bicarb      dvt ppx

## 2020-08-15 NOTE — PROGRESS NOTE ADULT - PROBLEM SELECTOR PLAN 3
- patient currently using BIPAP 15/5 at 50% with good effect  - this prescription is slightly increased from her home settings of 12/5  - monitor serum bicarb in the setting of chronic hypercapnia and diuresis

## 2020-08-15 NOTE — PROGRESS NOTE ADULT - PROBLEM SELECTOR PLAN 5
- diastolic heart failure noted on echo; preserved EF, no evidence of pulmonary hypertension  - continue acetazolamide with goal net negative - nutrition evaluation and weight loss

## 2020-08-16 LAB
GLUCOSE BLDC GLUCOMTR-MCNC: 104 MG/DL — HIGH (ref 70–99)
GLUCOSE BLDC GLUCOMTR-MCNC: 122 MG/DL — HIGH (ref 70–99)
GLUCOSE BLDC GLUCOMTR-MCNC: 172 MG/DL — HIGH (ref 70–99)
GLUCOSE BLDC GLUCOMTR-MCNC: 190 MG/DL — HIGH (ref 70–99)

## 2020-08-16 PROCEDURE — 99232 SBSQ HOSP IP/OBS MODERATE 35: CPT

## 2020-08-16 RX ORDER — TRAMADOL HYDROCHLORIDE 50 MG/1
25 TABLET ORAL ONCE
Refills: 0 | Status: DISCONTINUED | OUTPATIENT
Start: 2020-08-16 | End: 2020-08-16

## 2020-08-16 RX ADMIN — LACTULOSE 15 GRAM(S): 10 SOLUTION ORAL at 18:40

## 2020-08-16 RX ADMIN — ACETAZOLAMIDE 250 MILLIGRAM(S): 250 TABLET ORAL at 12:43

## 2020-08-16 RX ADMIN — Medication 10 UNIT(S): at 08:43

## 2020-08-16 RX ADMIN — BUMETANIDE 1 MILLIGRAM(S): 0.25 INJECTION INTRAMUSCULAR; INTRAVENOUS at 06:24

## 2020-08-16 RX ADMIN — Medication 1: at 08:44

## 2020-08-16 RX ADMIN — Medication 180 MILLIGRAM(S): at 06:22

## 2020-08-16 RX ADMIN — Medication 30 MILLILITER(S): at 01:36

## 2020-08-16 RX ADMIN — TIOTROPIUM BROMIDE 1 CAPSULE(S): 18 CAPSULE ORAL; RESPIRATORY (INHALATION) at 13:39

## 2020-08-16 RX ADMIN — Medication 3 MILLILITER(S): at 06:20

## 2020-08-16 RX ADMIN — Medication 3 MILLILITER(S): at 18:39

## 2020-08-16 RX ADMIN — BUDESONIDE AND FORMOTEROL FUMARATE DIHYDRATE 2 PUFF(S): 160; 4.5 AEROSOL RESPIRATORY (INHALATION) at 18:38

## 2020-08-16 RX ADMIN — TRAMADOL HYDROCHLORIDE 25 MILLIGRAM(S): 50 TABLET ORAL at 21:07

## 2020-08-16 RX ADMIN — SENNA PLUS 2 TABLET(S): 8.6 TABLET ORAL at 20:37

## 2020-08-16 RX ADMIN — INSULIN GLARGINE 20 UNIT(S): 100 INJECTION, SOLUTION SUBCUTANEOUS at 22:20

## 2020-08-16 RX ADMIN — Medication 1 TABLET(S): at 12:41

## 2020-08-16 RX ADMIN — APIXABAN 5 MILLIGRAM(S): 2.5 TABLET, FILM COATED ORAL at 18:38

## 2020-08-16 RX ADMIN — Medication 400 MILLIGRAM(S): at 12:41

## 2020-08-16 RX ADMIN — APIXABAN 5 MILLIGRAM(S): 2.5 TABLET, FILM COATED ORAL at 06:22

## 2020-08-16 RX ADMIN — Medication 0.25 MILLIGRAM(S): at 20:37

## 2020-08-16 RX ADMIN — MONTELUKAST 10 MILLIGRAM(S): 4 TABLET, CHEWABLE ORAL at 12:41

## 2020-08-16 RX ADMIN — Medication 10 MILLIGRAM(S): at 06:25

## 2020-08-16 RX ADMIN — PANTOPRAZOLE SODIUM 40 MILLIGRAM(S): 20 TABLET, DELAYED RELEASE ORAL at 06:24

## 2020-08-16 RX ADMIN — Medication 10 UNIT(S): at 13:35

## 2020-08-16 RX ADMIN — ATORVASTATIN CALCIUM 80 MILLIGRAM(S): 80 TABLET, FILM COATED ORAL at 20:37

## 2020-08-16 RX ADMIN — POLYETHYLENE GLYCOL 3350 17 GRAM(S): 17 POWDER, FOR SOLUTION ORAL at 20:37

## 2020-08-16 RX ADMIN — Medication 10 MILLIGRAM(S): at 20:37

## 2020-08-16 RX ADMIN — BUDESONIDE AND FORMOTEROL FUMARATE DIHYDRATE 2 PUFF(S): 160; 4.5 AEROSOL RESPIRATORY (INHALATION) at 06:20

## 2020-08-16 RX ADMIN — Medication 81 MILLIGRAM(S): at 12:41

## 2020-08-16 RX ADMIN — Medication 2000 UNIT(S): at 12:42

## 2020-08-16 RX ADMIN — CHLORHEXIDINE GLUCONATE 1 APPLICATION(S): 213 SOLUTION TOPICAL at 12:46

## 2020-08-16 RX ADMIN — TRAMADOL HYDROCHLORIDE 25 MILLIGRAM(S): 50 TABLET ORAL at 20:37

## 2020-08-16 NOTE — PROGRESS NOTE ADULT - SUBJECTIVE AND OBJECTIVE BOX
CARDIOLOGY FOLLOW UP NOTE - DR. THOMAS    Subjective:    denies chest pain, dyspnea, palpitations, dizziness  ROS: otherwise negative   overnight events:      PHYSICAL EXAM:  T(C): 36.5 (08-16-20 @ 12:03), Max: 37 (08-15-20 @ 13:57)  HR: 94 (08-16-20 @ 12:03) (71 - 94)  BP: 118/52 (08-16-20 @ 12:03) (118/52 - 135/70)  RR: 20 (08-16-20 @ 12:03) (18 - 20)  SpO2: 98% (08-16-20 @ 12:03) (95% - 100%)  Wt(kg): --  I&O's Summary    15 Aug 2020 07:01  -  16 Aug 2020 07:00  --------------------------------------------------------  IN: 440 mL / OUT: 800 mL / NET: -360 mL    16 Aug 2020 07:01  -  16 Aug 2020 13:44  --------------------------------------------------------  IN: 300 mL / OUT: 0 mL / NET: 300 mL      Daily     Daily     Appearance: Normal	  Cardiovascular: Normal S1 S2,RRR, No JVD, No murmurs  Respiratory: Lungs clear to auscultation	  Gastrointestinal:  Soft, Non-tender, + BS	  Extremities: Normal range of motion,edema b/l      Home Medications:  apixaban 5 mg oral tablet: 1 tab(s) orally every 12 hours (01 Aug 2020 21:52)  aspirin 81 mg oral delayed release tablet: 1 tab(s) orally once a day (01 Aug 2020 21:52)  CoQ10: 200 milligram(s) orally once a day (01 Aug 2020 21:52)  metFORMIN 500 mg oral tablet: 1 tab(s) orally 2 times a day (01 Aug 2020 21:52)  Multiple Vitamins oral tablet: 1 tab(s) orally once a day (01 Aug 2020 21:52)  nitroglycerin 0.4 mg sublingual tablet: 1 tab(s) sublingual every 5 minutes, As Needed (01 Aug 2020 21:52)  Onglyza 5 mg oral tablet: 1 tab(s) orally once a day (01 Aug 2020 21:52)  pantoprazole 40 mg oral delayed release tablet: 1 tab(s) orally once a day (before a meal) (01 Aug 2020 21:52)  predniSONE 20 mg oral tablet: 2 tab(s) orally once a day (01 Aug 2020 21:52)  rosuvastatin 20 mg oral tablet: 1 tab(s) orally once a day (01 Aug 2020 21:52)  Singulair 10 mg oral tablet: 1 tab(s) orally once a day (in the evening) (01 Aug 2020 21:52)  Spiriva 18 mcg inhalation capsule: 1 cap(s) inhaled once a day (01 Aug 2020 21:52)  Symbicort 160 mcg-4.5 mcg/inh inhalation aerosol: 2 puff(s) inhaled 2 times a day (01 Aug 2020 21:52)  theophylline 400 mg/24 hours oral tablet, extended release: 1 tab(s) orally once a day (01 Aug 2020 21:52)  Ventolin HFA 90 mcg/inh inhalation aerosol: 2 puff(s) inhaled 2 times a day (01 Aug 2020 21:52)  Vitamin D3 2000 intl units oral tablet: 1 tab(s) orally once a day (01 Aug 2020 21:52)      MEDICATIONS  (STANDING):  acetaZOLAMIDE Injectable 250 milliGRAM(s) IV Push <User Schedule>  albuterol/ipratropium for Nebulization 3 milliLiter(s) Nebulizer every 6 hours  apixaban 5 milliGRAM(s) Oral every 12 hours  aspirin enteric coated 81 milliGRAM(s) Oral daily  atorvastatin 80 milliGRAM(s) Oral at bedtime  azithromycin   Tablet 250 milliGRAM(s) Oral <User Schedule>  Biotene Dry Mouth Oral Rinse 5 milliLiter(s) Swish and Spit two times a day  bisacodyl Suppository 10 milliGRAM(s) Rectal daily  budesonide 160 MICROgram(s)/formoterol 4.5 MICROgram(s) Inhaler 2 Puff(s) Inhalation two times a day  buMETAnide 1 milliGRAM(s) Oral daily  chlorhexidine 2% Cloths 1 Application(s) Topical daily  cholecalciferol 2000 Unit(s) Oral daily  dextrose 5%. 1000 milliLiter(s) (50 mL/Hr) IV Continuous <Continuous>  dextrose 50% Injectable 25 Gram(s) IV Push once  diltiazem    milliGRAM(s) Oral daily  insulin glargine Injectable (LANTUS) 20 Unit(s) SubCutaneous at bedtime  insulin lispro (HumaLOG) corrective regimen sliding scale   SubCutaneous three times a day before meals  insulin lispro (HumaLOG) corrective regimen sliding scale   SubCutaneous at bedtime  insulin lispro Injectable (HumaLOG) 10 Unit(s) SubCutaneous three times a day with meals  lactulose Syrup 15 Gram(s) Oral two times a day  montelukast 10 milliGRAM(s) Oral daily  multivitamin 1 Tablet(s) Oral daily  pantoprazole    Tablet 40 milliGRAM(s) Oral before breakfast  polyethylene glycol 3350 17 Gram(s) Oral daily  predniSONE   Tablet 10 milliGRAM(s) Oral daily  senna 2 Tablet(s) Oral at bedtime  theophylline ER (24 Hour) 400 milliGRAM(s) Oral daily  tiotropium 18 MICROgram(s) Capsule 1 Capsule(s) Inhalation daily      TELEMETRY: 	    ECG:  	  RADIOLOGY:   DIAGNOSTIC TESTING:  [ ] Echocardiogram:  [ ] Catheterization:  [ ] Stress Test:    OTHER: 	    LABS:	 	    CARDIAC MARKERS:                                8.5    12.79 )-----------( 227      ( 15 Aug 2020 09:36 )             29.2     08-15    138  |  96  |  39<H>  ----------------------------<  152<H>  4.5   |  36<H>  |  1.29    Ca    9.2      15 Aug 2020 09:36      proBNP:     Lipid Profile:   HgA1c:     Creatinine, Serum: 1.29 mg/dL (08-15-20 @ 09:36)  Creatinine, Serum: 1.39 mg/dL (08-14-20 @ 09:28)

## 2020-08-16 NOTE — PROGRESS NOTE ADULT - ASSESSMENT
EVAN 1/7/19: no ALINA thrombus, unable to assess LV sys fx  echo 12/7/18: EF 71%, nl LV sys fx, mild diastolic dysfx     a/p    60 year old female with hx of D CHF, HTN, CAD s/p PCI, Afib/ aflutter, s/p Aflutter Ablation 12/2019, COPD, DM2, Anxiety, , raynaud phenomenon presenting with weakness, SOB , hyperkalemia.      1. Dyspnea, Resp failure  -secondary to chronic lung disease, COPD, volume overload  -pulm f/u   -covid 19 negative  -no acs , cv stable   -ecg with known RBBB, new twi noted, hyperkalemia also noted on admission  -echo with normal LVEF, mild diastolic dysfunction   -On PO Prednisone, Duoneb, Symbicort, Spiriva and Theophylline.    2. CAD s/p PCI   -stable, no cp  -c/w ASA, statin  -echo noted above     3. Afib/aflutter s/p  Aflutter Ablation 12/2019  -in NSR, cw cardizem  mg daily  -CHADsVac=2, c/w eliquis     4. Acute on Chronic Diastolic CHF   -remains overloaded  -dyspnea secondary to copd, maxwell chf  -on bipap  -echo with normal lVEF   -Has been on Diamox with poor response  -Restarted on IV Lasix, but now with worsening Cr and metabolic alkalosis, lasix on hold   -retrial of loop diuretic with expected rise in creatinine  -continue bumex 1mg daily  -change to IV bumex if output not adequate  -will closely monitor bicarb      dvt ppx

## 2020-08-16 NOTE — PROGRESS NOTE ADULT - ASSESSMENT
60F w COPD on 3LNC (?pending transplant list at [facility]), former smoker, CAD s/p stent (2016), afib on eliquis, uncontrolled DM2, raynaud phenomenon and recent hospitalization at [facility] for altered mental status and [facility] aw COPD exacerbation, LE swelling, weakness.     1. Acute COPD exacerbation- Dyspnea multifactorial in the setting of underlying lung disease, cardiovascular dysfunction, obesity, and deconditioning.     Taper prednisone. Overnight and prn Bipap. Pt comfortable off Bipap and able to speak in full sentences.     Pt belligerent and uncooperative during hospitalization, has refused to see many doctors including 2 pulmonary teams- now agreeable to see house pulm again, following.    Continue supplemental O2 with goal O2 sat > 88% - she does not need to be at 100%.    2. Acute diastolic CHF- Has been on Diamox with poor response. Restarted on IV Lasix, but developed worsening Cr and metabolic alkalosis, lasix held. Now on Bumex w improvement in LE edema. Continue.     Pulm- patient has a primary respiratory acidosis with a secondary metabolic alkalosis. Her pH and serum bicarb should be monitored closely. Her serum bicarb was previously in the 40s and at this point it is less than that. I do think furosemide or bumex would be a better diuretic as long as her serum bicarb is less than 40.    3. DM2- A1C 7.5. Lantus and premeal insulin.     4. Paroxysmal a fib- Now in NSR. On Apixiban, Cardizem.

## 2020-08-16 NOTE — PROGRESS NOTE ADULT - SUBJECTIVE AND OBJECTIVE BOX
Patient is a 60y old  Female who presents with a chief complaint of 60F p/w generalized weakness and difficulty walking (16 Aug 2020 13:44)    HPI: Reports improvement in dyspnea.     Vital Signs Last 24 Hrs  T(C): 36.5 (16 Aug 2020 12:03), Max: 36.8 (15 Aug 2020 21:54)  T(F): 97.7 (16 Aug 2020 12:03), Max: 98.3 (15 Aug 2020 21:54)  HR: 94 (16 Aug 2020 12:03) (71 - 94)  BP: 118/52 (16 Aug 2020 12:03) (118/52 - 134/66)  BP(mean): --  RR: 20 (16 Aug 2020 12:03) (18 - 20)  SpO2: 98% (16 Aug 2020 12:03) (95% - 100%)                          8.5    12.79 )-----------( 227      ( 15 Aug 2020 09:36 )             29.2     08-15    138  |  96  |  39<H>  ----------------------------<  152<H>  4.5   |  36<H>  |  1.29    Ca    9.2      15 Aug 2020 09:36    MEDICATIONS  (STANDING):  acetaZOLAMIDE Injectable 250 milliGRAM(s) IV Push <User Schedule>  albuterol/ipratropium for Nebulization 3 milliLiter(s) Nebulizer every 6 hours  apixaban 5 milliGRAM(s) Oral every 12 hours  aspirin enteric coated 81 milliGRAM(s) Oral daily  atorvastatin 80 milliGRAM(s) Oral at bedtime  azithromycin   Tablet 250 milliGRAM(s) Oral <User Schedule>  Biotene Dry Mouth Oral Rinse 5 milliLiter(s) Swish and Spit two times a day  bisacodyl Suppository 10 milliGRAM(s) Rectal daily  budesonide 160 MICROgram(s)/formoterol 4.5 MICROgram(s) Inhaler 2 Puff(s) Inhalation two times a day  buMETAnide 1 milliGRAM(s) Oral daily  chlorhexidine 2% Cloths 1 Application(s) Topical daily  cholecalciferol 2000 Unit(s) Oral daily  dextrose 5%. 1000 milliLiter(s) (50 mL/Hr) IV Continuous <Continuous>  dextrose 50% Injectable 25 Gram(s) IV Push once  diltiazem    milliGRAM(s) Oral daily  insulin glargine Injectable (LANTUS) 20 Unit(s) SubCutaneous at bedtime  insulin lispro (HumaLOG) corrective regimen sliding scale   SubCutaneous three times a day before meals  insulin lispro (HumaLOG) corrective regimen sliding scale   SubCutaneous at bedtime  insulin lispro Injectable (HumaLOG) 10 Unit(s) SubCutaneous three times a day with meals  lactulose Syrup 15 Gram(s) Oral two times a day  montelukast 10 milliGRAM(s) Oral daily  multivitamin 1 Tablet(s) Oral daily  pantoprazole    Tablet 40 milliGRAM(s) Oral before breakfast  polyethylene glycol 3350 17 Gram(s) Oral daily  predniSONE   Tablet 10 milliGRAM(s) Oral daily  senna 2 Tablet(s) Oral at bedtime  theophylline ER (24 Hour) 400 milliGRAM(s) Oral daily  tiotropium 18 MICROgram(s) Capsule 1 Capsule(s) Inhalation daily    MEDICATIONS  (PRN):  ALPRAZolam 0.25 milliGRAM(s) Oral two times a day PRN Anxiety  glucagon  Injectable 1 milliGRAM(s) IntraMuscular once PRN Glucose LESS THAN 70 milligrams/deciliter  guaiFENesin   Syrup  (Sugar-Free) 100 milliGRAM(s) Oral every 6 hours PRN Cough

## 2020-08-17 LAB
ANION GAP SERPL CALC-SCNC: 10 MMOL/L — SIGNIFICANT CHANGE UP (ref 5–17)
BUN SERPL-MCNC: 28 MG/DL — HIGH (ref 7–23)
CALCIUM SERPL-MCNC: 9.1 MG/DL — SIGNIFICANT CHANGE UP (ref 8.4–10.5)
CHLORIDE SERPL-SCNC: 93 MMOL/L — LOW (ref 96–108)
CO2 SERPL-SCNC: 34 MMOL/L — HIGH (ref 22–31)
CREAT SERPL-MCNC: 1.03 MG/DL — SIGNIFICANT CHANGE UP (ref 0.5–1.3)
GLUCOSE BLDC GLUCOMTR-MCNC: 144 MG/DL — HIGH (ref 70–99)
GLUCOSE BLDC GLUCOMTR-MCNC: 156 MG/DL — HIGH (ref 70–99)
GLUCOSE BLDC GLUCOMTR-MCNC: 158 MG/DL — HIGH (ref 70–99)
GLUCOSE BLDC GLUCOMTR-MCNC: 52 MG/DL — LOW (ref 70–99)
GLUCOSE BLDC GLUCOMTR-MCNC: 53 MG/DL — LOW (ref 70–99)
GLUCOSE BLDC GLUCOMTR-MCNC: 80 MG/DL — SIGNIFICANT CHANGE UP (ref 70–99)
GLUCOSE SERPL-MCNC: 148 MG/DL — HIGH (ref 70–99)
HCT VFR BLD CALC: 29.9 % — LOW (ref 34.5–45)
HGB BLD-MCNC: 8.6 G/DL — LOW (ref 11.5–15.5)
MCHC RBC-ENTMCNC: 24.8 PG — LOW (ref 27–34)
MCHC RBC-ENTMCNC: 28.8 GM/DL — LOW (ref 32–36)
MCV RBC AUTO: 86.2 FL — SIGNIFICANT CHANGE UP (ref 80–100)
NRBC # BLD: 0 /100 WBCS — SIGNIFICANT CHANGE UP (ref 0–0)
PLATELET # BLD AUTO: 211 K/UL — SIGNIFICANT CHANGE UP (ref 150–400)
POTASSIUM SERPL-MCNC: 4.4 MMOL/L — SIGNIFICANT CHANGE UP (ref 3.5–5.3)
POTASSIUM SERPL-SCNC: 4.4 MMOL/L — SIGNIFICANT CHANGE UP (ref 3.5–5.3)
RBC # BLD: 3.47 M/UL — LOW (ref 3.8–5.2)
RBC # FLD: 14.6 % — HIGH (ref 10.3–14.5)
SODIUM SERPL-SCNC: 137 MMOL/L — SIGNIFICANT CHANGE UP (ref 135–145)
TSH SERPL-MCNC: 1.8 UIU/ML — SIGNIFICANT CHANGE UP (ref 0.27–4.2)
WBC # BLD: 13.9 K/UL — HIGH (ref 3.8–10.5)
WBC # FLD AUTO: 13.9 K/UL — HIGH (ref 3.8–10.5)

## 2020-08-17 PROCEDURE — 99232 SBSQ HOSP IP/OBS MODERATE 35: CPT

## 2020-08-17 PROCEDURE — 99233 SBSQ HOSP IP/OBS HIGH 50: CPT

## 2020-08-17 RX ORDER — ACETAMINOPHEN 500 MG
1000 TABLET ORAL ONCE
Refills: 0 | Status: COMPLETED | OUTPATIENT
Start: 2020-08-17 | End: 2020-08-17

## 2020-08-17 RX ORDER — TRAMADOL HYDROCHLORIDE 50 MG/1
25 TABLET ORAL ONCE
Refills: 0 | Status: DISCONTINUED | OUTPATIENT
Start: 2020-08-17 | End: 2020-08-17

## 2020-08-17 RX ADMIN — POLYETHYLENE GLYCOL 3350 17 GRAM(S): 17 POWDER, FOR SOLUTION ORAL at 12:51

## 2020-08-17 RX ADMIN — INSULIN GLARGINE 20 UNIT(S): 100 INJECTION, SOLUTION SUBCUTANEOUS at 21:30

## 2020-08-17 RX ADMIN — SENNA PLUS 2 TABLET(S): 8.6 TABLET ORAL at 21:58

## 2020-08-17 RX ADMIN — TRAMADOL HYDROCHLORIDE 25 MILLIGRAM(S): 50 TABLET ORAL at 06:31

## 2020-08-17 RX ADMIN — Medication 81 MILLIGRAM(S): at 12:53

## 2020-08-17 RX ADMIN — APIXABAN 5 MILLIGRAM(S): 2.5 TABLET, FILM COATED ORAL at 18:07

## 2020-08-17 RX ADMIN — Medication 1 TABLET(S): at 12:54

## 2020-08-17 RX ADMIN — Medication 400 MILLIGRAM(S): at 21:57

## 2020-08-17 RX ADMIN — TIOTROPIUM BROMIDE 1 CAPSULE(S): 18 CAPSULE ORAL; RESPIRATORY (INHALATION) at 12:52

## 2020-08-17 RX ADMIN — Medication 5 MILLILITER(S): at 06:09

## 2020-08-17 RX ADMIN — Medication 10 UNIT(S): at 09:36

## 2020-08-17 RX ADMIN — BUMETANIDE 1 MILLIGRAM(S): 0.25 INJECTION INTRAMUSCULAR; INTRAVENOUS at 06:08

## 2020-08-17 RX ADMIN — Medication 5 MILLILITER(S): at 18:07

## 2020-08-17 RX ADMIN — Medication 10 UNIT(S): at 14:20

## 2020-08-17 RX ADMIN — BUDESONIDE AND FORMOTEROL FUMARATE DIHYDRATE 2 PUFF(S): 160; 4.5 AEROSOL RESPIRATORY (INHALATION) at 18:06

## 2020-08-17 RX ADMIN — TRAMADOL HYDROCHLORIDE 25 MILLIGRAM(S): 50 TABLET ORAL at 07:30

## 2020-08-17 RX ADMIN — Medication 10 MILLIGRAM(S): at 06:09

## 2020-08-17 RX ADMIN — Medication 1: at 14:21

## 2020-08-17 RX ADMIN — Medication 3 MILLILITER(S): at 23:22

## 2020-08-17 RX ADMIN — CHLORHEXIDINE GLUCONATE 1 APPLICATION(S): 213 SOLUTION TOPICAL at 12:59

## 2020-08-17 RX ADMIN — APIXABAN 5 MILLIGRAM(S): 2.5 TABLET, FILM COATED ORAL at 06:09

## 2020-08-17 RX ADMIN — MONTELUKAST 10 MILLIGRAM(S): 4 TABLET, CHEWABLE ORAL at 12:53

## 2020-08-17 RX ADMIN — Medication 3 MILLILITER(S): at 06:07

## 2020-08-17 RX ADMIN — Medication 100 MILLIGRAM(S): at 00:05

## 2020-08-17 RX ADMIN — ACETAZOLAMIDE 250 MILLIGRAM(S): 250 TABLET ORAL at 14:18

## 2020-08-17 RX ADMIN — ATORVASTATIN CALCIUM 80 MILLIGRAM(S): 80 TABLET, FILM COATED ORAL at 21:57

## 2020-08-17 RX ADMIN — Medication 2000 UNIT(S): at 12:54

## 2020-08-17 RX ADMIN — Medication 1000 MILLIGRAM(S): at 22:41

## 2020-08-17 RX ADMIN — Medication 3 MILLILITER(S): at 00:05

## 2020-08-17 RX ADMIN — Medication 400 MILLIGRAM(S): at 12:55

## 2020-08-17 RX ADMIN — Medication 0.25 MILLIGRAM(S): at 22:28

## 2020-08-17 RX ADMIN — Medication 3 MILLILITER(S): at 12:51

## 2020-08-17 RX ADMIN — Medication 180 MILLIGRAM(S): at 06:08

## 2020-08-17 RX ADMIN — AZITHROMYCIN 250 MILLIGRAM(S): 500 TABLET, FILM COATED ORAL at 18:06

## 2020-08-17 RX ADMIN — PANTOPRAZOLE SODIUM 40 MILLIGRAM(S): 20 TABLET, DELAYED RELEASE ORAL at 06:08

## 2020-08-17 RX ADMIN — BUDESONIDE AND FORMOTEROL FUMARATE DIHYDRATE 2 PUFF(S): 160; 4.5 AEROSOL RESPIRATORY (INHALATION) at 06:08

## 2020-08-17 RX ADMIN — Medication 3 MILLILITER(S): at 18:07

## 2020-08-17 NOTE — PROGRESS NOTE ADULT - ASSESSMENT
EVAN 1/7/19: no ALINA thrombus, unable to assess LV sys fx  echo 12/7/18: EF 71%, nl LV sys fx, mild diastolic dysfx     a/p    60 year old female with hx of D CHF, HTN, CAD s/p PCI, Afib/ aflutter, s/p Aflutter Ablation 12/2019, COPD, DM2, Anxiety, , raynaud phenomenon presenting with weakness, SOB , hyperkalemia.      1. Dyspnea, Resp failure  -secondary to chronic lung disease, COPD, volume overload  -pulm f/u   -covid 19 negative  -no acs , cv stable   -ecg with known RBBB, new twi noted, hyperkalemia also noted on admission  -echo with normal LVEF, mild diastolic dysfunction   -On PO Prednisone, Duoneb, Symbicort, Spiriva and Theophylline.    2. CAD s/p PCI   -stable, no cp  -c/w ASA, statin  -echo noted above     3. Afib/aflutter s/p  Aflutter Ablation 12/2019  -in NSR, cw cardizem  mg daily  -CHADsVac=2, c/w eliquis     4. Acute on Chronic Diastolic CHF   -dyspnea secondary to copd, maxwell chf  -on bipap  -echo with normal lVEF   -Has been on Diamox with poor response  -Restarted on IV Lasix, but now with worsening Cr and metabolic alkalosis, lasix on hold   -creat stable, LE edema improving , net ouput neg  -continue bumex 1mg daily  -will closely monitor bicarb      dvt ppx EVAN 1/7/19: no ALINA thrombus, unable to assess LV sys fx  echo 12/7/18: EF 71%, nl LV sys fx, mild diastolic dysfx     a/p    60 year old female with hx of D CHF, HTN, CAD s/p PCI, Afib/ aflutter, s/p Aflutter Ablation 12/2019, COPD, DM2, Anxiety, , raynaud phenomenon presenting with weakness, SOB , hyperkalemia.      1. Dyspnea, Resp failure  -secondary to chronic lung disease, COPD, volume overload  -pulm f/u   -covid 19 negative  -no acs , cv stable   -ecg with known RBBB, new twi noted, hyperkalemia also noted on admission  -echo with normal LVEF, mild diastolic dysfunction   -On PO Prednisone, Duoneb, Symbicort, Spiriva and Theophylline.    2. CAD s/p PCI   -stable, no cp  -c/w ASA, statin  -echo noted above     3. Afib/aflutter s/p  Aflutter Ablation 12/2019  -in NSR, cw cardizem  mg daily  -CHADsVac=2, c/w eliquis     4. Acute on Chronic Diastolic CHF   -dyspnea secondary to copd, maxwell chf  -on bipap  -echo with normal lVEF   -Has been on Diamox with poor response  -Restarted on IV Lasix, but held for worsening Cr and metabolic alkalosis   -creat stable, LE edema improving , net ouput neg  -continue bumex 1mg daily  -cont diamox per pulmo  -will closely monitor bicarb      dvt ppx

## 2020-08-17 NOTE — PROGRESS NOTE ADULT - ATTENDING COMMENTS
Agree with above NP note.  cv stable  continue bumex as ordered  monitor bmp  renal fxn, hco3 stable

## 2020-08-17 NOTE — CHART NOTE - NSCHARTNOTEFT_GEN_A_CORE
Wound Care team Note:    Request for wound care consult received for foot wound and deferred to wound care team podiatrist, Dr. Gonzalez. Will defer to Dr. Gonzalez for management.    Josefina Oliveira NP-C, [facility] [id number]

## 2020-08-17 NOTE — PROGRESS NOTE ADULT - ASSESSMENT
60F w COPD on 3LNC (?pending transplant list at [facility]), former smoker, CAD s/p stent (2016), afib on eliquis, uncontrolled DM2, raynaud phenomenon and recent hospitalization at [facility] for altered mental status and [facility] aw COPD exacerbation, LE swelling, weakness.     1. Acute COPD exacerbation- Dyspnea multifactorial in the setting of underlying lung disease, cardiovascular dysfunction, obesity, and deconditioning.     On Prednisone taper. Overnight and prn Bipap. Pt comfortable off Bipap and able to speak in full sentences.     Pt belligerent and uncooperative during hospitalization, has refused to see many doctors including 2 pulmonary teams- now agreeable to see house pulm again, following.    Continue supplemental O2 with goal O2 sat > 88% - she does not need to be at 100%.    2. Acute diastolic CHF- Has been on Diamox with poor response. Restarted on IV Lasix, but developed worsening Cr and metabolic alkalosis, lasix held. Now on Bumex w improvement in LE edema. Continue.     Pulm- patient has a primary respiratory acidosis with a secondary metabolic alkalosis. Her pH and serum bicarb should be monitored closely. Her serum bicarb was previously in the 40s and at this point it is less than that. I do think furosemide or bumex would be a better diuretic as long as her serum bicarb is less than 40.    3. DM2- A1C 7.5. Lantus and premeal insulin.     4. Paroxysmal a fib- Now in NSR. On Apixiban, Cardizem.

## 2020-08-17 NOTE — PROGRESS NOTE ADULT - SUBJECTIVE AND OBJECTIVE BOX
Patient is a 60y old  Female who presents with a chief complaint of 60F p/w generalized weakness and difficulty walking (16 Aug 2020 15:42)    HPI: Reports improvement in dyspnea.     Vital Signs Last 24 Hrs  T(C): 36.4 (17 Aug 2020 08:09), Max: 36.7 (16 Aug 2020 16:13)  T(F): 97.5 (17 Aug 2020 08:09), Max: 98.1 (16 Aug 2020 16:13)  HR: 80 (17 Aug 2020 10:47) (70 - 95)  BP: 94/58 (17 Aug 2020 08:09) (94/58 - 132/61)  BP(mean): --  RR: 18 (17 Aug 2020 08:09) (18 - 20)  SpO2: 99% (17 Aug 2020 10:47) (96% - 100%)                          8.6    13.90 )-----------( 211      ( 17 Aug 2020 10:04 )             29.9     08-17    137  |  93<L>  |  28<H>  ----------------------------<  148<H>  4.4   |  34<H>  |  1.03    Ca    9.1      17 Aug 2020 10:04    MEDICATIONS  (STANDING):  acetaZOLAMIDE Injectable 250 milliGRAM(s) IV Push <User Schedule>  albuterol/ipratropium for Nebulization 3 milliLiter(s) Nebulizer every 6 hours  apixaban 5 milliGRAM(s) Oral every 12 hours  aspirin enteric coated 81 milliGRAM(s) Oral daily  atorvastatin 80 milliGRAM(s) Oral at bedtime  azithromycin   Tablet 250 milliGRAM(s) Oral <User Schedule>  Biotene Dry Mouth Oral Rinse 5 milliLiter(s) Swish and Spit two times a day  bisacodyl Suppository 10 milliGRAM(s) Rectal daily  budesonide 160 MICROgram(s)/formoterol 4.5 MICROgram(s) Inhaler 2 Puff(s) Inhalation two times a day  buMETAnide 1 milliGRAM(s) Oral daily  chlorhexidine 2% Cloths 1 Application(s) Topical daily  cholecalciferol 2000 Unit(s) Oral daily  dextrose 5%. 1000 milliLiter(s) (50 mL/Hr) IV Continuous <Continuous>  dextrose 50% Injectable 25 Gram(s) IV Push once  diltiazem    milliGRAM(s) Oral daily  insulin glargine Injectable (LANTUS) 20 Unit(s) SubCutaneous at bedtime  insulin lispro (HumaLOG) corrective regimen sliding scale   SubCutaneous three times a day before meals  insulin lispro (HumaLOG) corrective regimen sliding scale   SubCutaneous at bedtime  insulin lispro Injectable (HumaLOG) 10 Unit(s) SubCutaneous three times a day with meals  lactulose Syrup 15 Gram(s) Oral two times a day  montelukast 10 milliGRAM(s) Oral daily  multivitamin 1 Tablet(s) Oral daily  pantoprazole    Tablet 40 milliGRAM(s) Oral before breakfast  polyethylene glycol 3350 17 Gram(s) Oral daily  predniSONE   Tablet 10 milliGRAM(s) Oral daily  senna 2 Tablet(s) Oral at bedtime  theophylline ER (24 Hour) 400 milliGRAM(s) Oral daily  tiotropium 18 MICROgram(s) Capsule 1 Capsule(s) Inhalation daily    MEDICATIONS  (PRN):  ALPRAZolam 0.25 milliGRAM(s) Oral two times a day PRN Anxiety  glucagon  Injectable 1 milliGRAM(s) IntraMuscular once PRN Glucose LESS THAN 70 milligrams/deciliter  guaiFENesin   Syrup  (Sugar-Free) 100 milliGRAM(s) Oral every 6 hours PRN Cough

## 2020-08-17 NOTE — PROGRESS NOTE ADULT - SUBJECTIVE AND OBJECTIVE BOX
CHIEF COMPLAINT: Patient is a 60y old  Female who presents with a chief complaint of 60F p/w generalized weakness and difficulty walking (14 Aug 2020 15:22)          OVERNIGHT EVENTS    currently on 4L O2    used BIPAP 15/5 overnight and using intermittently in daytime     ROS:  CONSTITUTIONAL: edemetaous  CARDIOVASCULAR: Denies chest pain or palpitations  PULMONARY: Dyspnea, no hemoptysis  [x] REMAINING REVIEW OF SYSTEMS NEGATIVE        MEDICATIONS  (STANDING):  acetaZOLAMIDE Injectable 250 milliGRAM(s) IV Push <User Schedule>  albuterol/ipratropium for Nebulization 3 milliLiter(s) Nebulizer every 6 hours  apixaban 5 milliGRAM(s) Oral every 12 hours  aspirin enteric coated 81 milliGRAM(s) Oral daily  atorvastatin 80 milliGRAM(s) Oral at bedtime  azithromycin   Tablet 250 milliGRAM(s) Oral <User Schedule>  Biotene Dry Mouth Oral Rinse 5 milliLiter(s) Swish and Spit two times a day  bisacodyl Suppository 10 milliGRAM(s) Rectal daily  budesonide 160 MICROgram(s)/formoterol 4.5 MICROgram(s) Inhaler 2 Puff(s) Inhalation two times a day  buMETAnide 1 milliGRAM(s) Oral daily  chlorhexidine 2% Cloths 1 Application(s) Topical daily  cholecalciferol 2000 Unit(s) Oral daily  dextrose 5%. 1000 milliLiter(s) (50 mL/Hr) IV Continuous <Continuous>  dextrose 50% Injectable 25 Gram(s) IV Push once  diltiazem    milliGRAM(s) Oral daily  insulin glargine Injectable (LANTUS) 20 Unit(s) SubCutaneous at bedtime  insulin lispro (HumaLOG) corrective regimen sliding scale   SubCutaneous three times a day before meals  insulin lispro (HumaLOG) corrective regimen sliding scale   SubCutaneous at bedtime  insulin lispro Injectable (HumaLOG) 10 Unit(s) SubCutaneous three times a day with meals  lactulose Syrup 15 Gram(s) Oral two times a day  montelukast 10 milliGRAM(s) Oral daily  multivitamin 1 Tablet(s) Oral daily  pantoprazole    Tablet 40 milliGRAM(s) Oral before breakfast  polyethylene glycol 3350 17 Gram(s) Oral daily  predniSONE   Tablet 10 milliGRAM(s) Oral daily  senna 2 Tablet(s) Oral at bedtime  theophylline ER (24 Hour) 400 milliGRAM(s) Oral daily  tiotropium 18 MICROgram(s) Capsule 1 Capsule(s) Inhalation daily            Vital Signs Last 24 Hrs  T(C): 36.4 (17 Aug 2020 08:09), Max: 36.7 (16 Aug 2020 16:13)  T(F): 97.5 (17 Aug 2020 08:09), Max: 98.1 (16 Aug 2020 16:13)  HR: 80 (17 Aug 2020 10:47) (70 - 95)  BP: 94/58 (17 Aug 2020 08:09) (94/58 - 132/61)  BP(mean): --  RR: 18 (17 Aug 2020 08:09) (18 - 20)  SpO2: 99% (17 Aug 2020 10:47) (96% - 100%)       GENERAL:  AAOx3, baseline dyspnea  EYES: anicteric, EOMI  EAR/NOSE/MOUTH/THROAT: NCAT, MMM, nares clear, trachea midline, no thrush  CARDIOVASCULAR: RRR, S1S2, systolic murmur  RESPIRATORY: prolonged expiratory phase, no wheezing  ABDOMEN: soft, obese, NT, ND, +BS  EXTREMITIES:  bilateral LE edema, pitting up to thighs  SKIN: LE wrapped bilaterally, large blister on left foot  MUSCULOSKELETAL: strength diminished  NEUROLOGIC: nonfocal exam, pain in back side                                 8.6    13.90 )-----------( 211      ( 17 Aug 2020 10:04 )             29.9   08-17    137  |  93<L>  |  28<H>  ----------------------------<  148<H>  4.4   |  34<H>  |  1.03    Ca    9.1      17 Aug 2020 10:04        ECHO: < from: Transthoracic Echocardiogram (08.05.20 @ 02:57) >  Mitral Valve: Normal mitral valve. Minimal mitral  regurgitation.  Aortic Valve/Aorta: Aortic valve not well visualized. Peak  transaortic valve gradient equals 13 mm Hg, mean  transaortic valve gradient equals 6 mm Hg, aortic valve  velocity time integral equals 30 cm. Peak left ventricular  outflow tract gradient equals 5 mm Hg, mean gradient is  equal to 2 mm Hg, LVOT velocity time integral equals 15 cm.  Aortic Root: 3.4 cm.  Left Atrium: Normal left atrium.  Left Ventricle: Normal left ventricular systolic function.  No segmental wall motion abnormalities. Normal left  ventricular internal dimensions and wall thicknesses. Mild  diastolic dysfunction (Stage I).  Right Heart: Normal right atrium. Normal right ventricular  size and systolic function. Normal tricuspid valve.  Pericardium/Pleura: Normal pericardium with no pericardial  effusion.  Hemodynamic: Estimated right atrial pressure is 8 mm Hg.  ------------------------------------------------------------------------  Conclusions:  1. Normal left ventricular internal dimensions and wall  thicknesses.  2. Normal left ventricular systolic function. No segmental  wall motion abnormalities.  3. Mild diastolic dysfunction (Stage I).  4. Normal right ventricular size and systolic function.    < end of copied text >

## 2020-08-17 NOTE — PROGRESS NOTE ADULT - ASSESSMENT
61 y/o F with PMH of COPD on home oxygen 4L NC, AF on Eliquis, CAD s/p PCI p/w acute on chronic respiratory failure. Cont to be grossly volume overloaded and more dyspneic than her baseline        - cont diuresis w/ bumex  -monitor I/O  - hold off diamox and monitor serum bicarb    - cont nocturnal bipap which provides her significant relief but also requiring intermittently in daytime- goal to decrease use in daytime   - cont NC daytime goal sat 90-95%  - cont wean off prednisone, last dose tomorrow  - cont spiriva and symbicort  - cont theophylline  - cont nebulizer tx  - on azithro MWF  - DVT ppx - on a/c for AF  -ultimately will need to follow w/ [facility] for lung transplant evaluation  - cont physical therapy  - wound care for blister on foot

## 2020-08-17 NOTE — PROGRESS NOTE ADULT - SUBJECTIVE AND OBJECTIVE BOX
CARDIOLOGY FOLLOW UP - Dr. Brunson    CC no cp or sob  LE edema improving       PHYSICAL EXAM:  T(C): 36.4 (08-17-20 @ 08:09), Max: 36.7 (08-16-20 @ 16:13)  HR: 80 (08-17-20 @ 10:47) (70 - 95)  BP: 94/58 (08-17-20 @ 08:09) (94/58 - 132/61)  RR: 18 (08-17-20 @ 08:09) (18 - 20)  SpO2: 99% (08-17-20 @ 10:47) (96% - 100%)  Wt(kg): --  I&O's Summary    16 Aug 2020 07:01  -  17 Aug 2020 07:00  --------------------------------------------------------  IN: 300 mL / OUT: 2100 mL / NET: -1800 mL    17 Aug 2020 07:01  -  17 Aug 2020 15:32  --------------------------------------------------------  IN: 0 mL / OUT: 650 mL / NET: -650 mL        Appearance: Normal	  Cardiovascular: Normal S1 S2,RRR, No JVD, No murmurs  Respiratory: diminished  	  Gastrointestinal:  Soft, Non-tender, + BS	  Extremities: bl le edema ++         MEDICATIONS  (STANDING):  acetaZOLAMIDE Injectable 250 milliGRAM(s) IV Push <User Schedule>  albuterol/ipratropium for Nebulization 3 milliLiter(s) Nebulizer every 6 hours  apixaban 5 milliGRAM(s) Oral every 12 hours  aspirin enteric coated 81 milliGRAM(s) Oral daily  atorvastatin 80 milliGRAM(s) Oral at bedtime  azithromycin   Tablet 250 milliGRAM(s) Oral <User Schedule>  Biotene Dry Mouth Oral Rinse 5 milliLiter(s) Swish and Spit two times a day  bisacodyl Suppository 10 milliGRAM(s) Rectal daily  budesonide 160 MICROgram(s)/formoterol 4.5 MICROgram(s) Inhaler 2 Puff(s) Inhalation two times a day  buMETAnide 1 milliGRAM(s) Oral daily  chlorhexidine 2% Cloths 1 Application(s) Topical daily  cholecalciferol 2000 Unit(s) Oral daily  dextrose 5%. 1000 milliLiter(s) (50 mL/Hr) IV Continuous <Continuous>  dextrose 50% Injectable 25 Gram(s) IV Push once  diltiazem    milliGRAM(s) Oral daily  insulin glargine Injectable (LANTUS) 20 Unit(s) SubCutaneous at bedtime  insulin lispro (HumaLOG) corrective regimen sliding scale   SubCutaneous three times a day before meals  insulin lispro (HumaLOG) corrective regimen sliding scale   SubCutaneous at bedtime  insulin lispro Injectable (HumaLOG) 10 Unit(s) SubCutaneous three times a day with meals  lactulose Syrup 15 Gram(s) Oral two times a day  montelukast 10 milliGRAM(s) Oral daily  multivitamin 1 Tablet(s) Oral daily  pantoprazole    Tablet 40 milliGRAM(s) Oral before breakfast  polyethylene glycol 3350 17 Gram(s) Oral daily  predniSONE   Tablet 10 milliGRAM(s) Oral daily  senna 2 Tablet(s) Oral at bedtime  theophylline ER (24 Hour) 400 milliGRAM(s) Oral daily  tiotropium 18 MICROgram(s) Capsule 1 Capsule(s) Inhalation daily      TELEMETRY: 	    ECG:  	  RADIOLOGY:   DIAGNOSTIC TESTING:  [ ] Echocardiogram:  [ ]  Catheterization:  [ ] Stress Test:    OTHER: 	    LABS:	 	                            8.6    13.90 )-----------( 211      ( 17 Aug 2020 10:04 )             29.9     08-17    137  |  93<L>  |  28<H>  ----------------------------<  148<H>  4.4   |  34<H>  |  1.03    Ca    9.1      17 Aug 2020 10:04              1.

## 2020-08-18 LAB
ANION GAP SERPL CALC-SCNC: 5 MMOL/L — SIGNIFICANT CHANGE UP (ref 5–17)
BUN SERPL-MCNC: 26 MG/DL — HIGH (ref 7–23)
CALCIUM SERPL-MCNC: 8.9 MG/DL — SIGNIFICANT CHANGE UP (ref 8.4–10.5)
CHLORIDE SERPL-SCNC: 91 MMOL/L — LOW (ref 96–108)
CO2 SERPL-SCNC: 36 MMOL/L — HIGH (ref 22–31)
CREAT SERPL-MCNC: 1.07 MG/DL — SIGNIFICANT CHANGE UP (ref 0.5–1.3)
GLUCOSE BLDC GLUCOMTR-MCNC: 109 MG/DL — HIGH (ref 70–99)
GLUCOSE BLDC GLUCOMTR-MCNC: 142 MG/DL — HIGH (ref 70–99)
GLUCOSE BLDC GLUCOMTR-MCNC: 152 MG/DL — HIGH (ref 70–99)
GLUCOSE BLDC GLUCOMTR-MCNC: 229 MG/DL — HIGH (ref 70–99)
GLUCOSE BLDC GLUCOMTR-MCNC: 246 MG/DL — HIGH (ref 70–99)
GLUCOSE SERPL-MCNC: 155 MG/DL — HIGH (ref 70–99)
HCT VFR BLD CALC: 27.5 % — LOW (ref 34.5–45)
HGB BLD-MCNC: 8.1 G/DL — LOW (ref 11.5–15.5)
MCHC RBC-ENTMCNC: 25 PG — LOW (ref 27–34)
MCHC RBC-ENTMCNC: 29.5 GM/DL — LOW (ref 32–36)
MCV RBC AUTO: 84.9 FL — SIGNIFICANT CHANGE UP (ref 80–100)
NRBC # BLD: 0 /100 WBCS — SIGNIFICANT CHANGE UP (ref 0–0)
PLATELET # BLD AUTO: 212 K/UL — SIGNIFICANT CHANGE UP (ref 150–400)
POTASSIUM SERPL-MCNC: 3.6 MMOL/L — SIGNIFICANT CHANGE UP (ref 3.5–5.3)
POTASSIUM SERPL-SCNC: 3.6 MMOL/L — SIGNIFICANT CHANGE UP (ref 3.5–5.3)
RBC # BLD: 3.24 M/UL — LOW (ref 3.8–5.2)
RBC # FLD: 14.6 % — HIGH (ref 10.3–14.5)
SODIUM SERPL-SCNC: 132 MMOL/L — LOW (ref 135–145)
WBC # BLD: 9.08 K/UL — SIGNIFICANT CHANGE UP (ref 3.8–10.5)
WBC # FLD AUTO: 9.08 K/UL — SIGNIFICANT CHANGE UP (ref 3.8–10.5)

## 2020-08-18 PROCEDURE — 99233 SBSQ HOSP IP/OBS HIGH 50: CPT

## 2020-08-18 RX ORDER — INSULIN LISPRO 100/ML
8 VIAL (ML) SUBCUTANEOUS
Refills: 0 | Status: DISCONTINUED | OUTPATIENT
Start: 2020-08-18 | End: 2020-08-20

## 2020-08-18 RX ORDER — ACETAMINOPHEN 500 MG
1000 TABLET ORAL ONCE
Refills: 0 | Status: COMPLETED | OUTPATIENT
Start: 2020-08-18 | End: 2020-08-18

## 2020-08-18 RX ADMIN — Medication 0.25 MILLIGRAM(S): at 22:18

## 2020-08-18 RX ADMIN — Medication 0: at 21:15

## 2020-08-18 RX ADMIN — APIXABAN 5 MILLIGRAM(S): 2.5 TABLET, FILM COATED ORAL at 18:58

## 2020-08-18 RX ADMIN — POLYETHYLENE GLYCOL 3350 17 GRAM(S): 17 POWDER, FOR SOLUTION ORAL at 14:32

## 2020-08-18 RX ADMIN — MONTELUKAST 10 MILLIGRAM(S): 4 TABLET, CHEWABLE ORAL at 14:33

## 2020-08-18 RX ADMIN — INSULIN GLARGINE 20 UNIT(S): 100 INJECTION, SOLUTION SUBCUTANEOUS at 21:15

## 2020-08-18 RX ADMIN — SENNA PLUS 2 TABLET(S): 8.6 TABLET ORAL at 20:01

## 2020-08-18 RX ADMIN — Medication 5 MILLILITER(S): at 05:30

## 2020-08-18 RX ADMIN — BUDESONIDE AND FORMOTEROL FUMARATE DIHYDRATE 2 PUFF(S): 160; 4.5 AEROSOL RESPIRATORY (INHALATION) at 05:31

## 2020-08-18 RX ADMIN — Medication 100 MILLIGRAM(S): at 20:20

## 2020-08-18 RX ADMIN — BUDESONIDE AND FORMOTEROL FUMARATE DIHYDRATE 2 PUFF(S): 160; 4.5 AEROSOL RESPIRATORY (INHALATION) at 18:58

## 2020-08-18 RX ADMIN — Medication 180 MILLIGRAM(S): at 05:30

## 2020-08-18 RX ADMIN — Medication 2000 UNIT(S): at 14:33

## 2020-08-18 RX ADMIN — Medication 1000 MILLIGRAM(S): at 23:00

## 2020-08-18 RX ADMIN — Medication 10 MILLIGRAM(S): at 05:30

## 2020-08-18 RX ADMIN — BUMETANIDE 1 MILLIGRAM(S): 0.25 INJECTION INTRAMUSCULAR; INTRAVENOUS at 05:30

## 2020-08-18 RX ADMIN — LACTULOSE 15 GRAM(S): 10 SOLUTION ORAL at 05:31

## 2020-08-18 RX ADMIN — Medication 3 MILLILITER(S): at 18:59

## 2020-08-18 RX ADMIN — Medication 8 UNIT(S): at 18:57

## 2020-08-18 RX ADMIN — APIXABAN 5 MILLIGRAM(S): 2.5 TABLET, FILM COATED ORAL at 05:30

## 2020-08-18 RX ADMIN — CHLORHEXIDINE GLUCONATE 1 APPLICATION(S): 213 SOLUTION TOPICAL at 18:58

## 2020-08-18 RX ADMIN — Medication 81 MILLIGRAM(S): at 14:33

## 2020-08-18 RX ADMIN — Medication 400 MILLIGRAM(S): at 14:33

## 2020-08-18 RX ADMIN — Medication 10 UNIT(S): at 14:32

## 2020-08-18 RX ADMIN — Medication 2: at 14:31

## 2020-08-18 RX ADMIN — Medication 3 MILLILITER(S): at 05:30

## 2020-08-18 RX ADMIN — Medication 1: at 10:10

## 2020-08-18 RX ADMIN — Medication 3 MILLILITER(S): at 14:32

## 2020-08-18 RX ADMIN — ATORVASTATIN CALCIUM 80 MILLIGRAM(S): 80 TABLET, FILM COATED ORAL at 20:01

## 2020-08-18 RX ADMIN — Medication 400 MILLIGRAM(S): at 22:18

## 2020-08-18 RX ADMIN — Medication 1 TABLET(S): at 14:33

## 2020-08-18 RX ADMIN — TIOTROPIUM BROMIDE 1 CAPSULE(S): 18 CAPSULE ORAL; RESPIRATORY (INHALATION) at 14:33

## 2020-08-18 RX ADMIN — Medication 10 UNIT(S): at 10:10

## 2020-08-18 RX ADMIN — PANTOPRAZOLE SODIUM 40 MILLIGRAM(S): 20 TABLET, DELAYED RELEASE ORAL at 10:11

## 2020-08-18 NOTE — PROGRESS NOTE ADULT - SUBJECTIVE AND OBJECTIVE BOX
Patient is a 60y old  Female who presents with a chief complaint of 60F p/w generalized weakness and difficulty walking (18 Aug 2020 11:08)    HPI: Pt continues to be belligerent and abusive.     Vital Signs Last 24 Hrs  T(C): 36.5 (18 Aug 2020 08:45), Max: 36.8 (17 Aug 2020 18:30)  T(F): 97.7 (18 Aug 2020 08:45), Max: 98.3 (17 Aug 2020 18:30)  HR: 71 (18 Aug 2020 09:52) (69 - 79)  BP: 110/56 (18 Aug 2020 08:45) (110/56 - 127/50)  BP(mean): --  RR: 20 (18 Aug 2020 09:51) (18 - 20)  SpO2: 98% (18 Aug 2020 09:52) (96% - 99%)                          8.1    9.08  )-----------( 212      ( 18 Aug 2020 10:02 )             27.5     08-18    132<L>  |  91<L>  |  26<H>  ----------------------------<  155<H>  3.6   |  36<H>  |  1.07    Ca    8.9      18 Aug 2020 10:02      MEDICATIONS  (STANDING):  albuterol/ipratropium for Nebulization 3 milliLiter(s) Nebulizer every 6 hours  apixaban 5 milliGRAM(s) Oral every 12 hours  aspirin enteric coated 81 milliGRAM(s) Oral daily  atorvastatin 80 milliGRAM(s) Oral at bedtime  azithromycin   Tablet 250 milliGRAM(s) Oral <User Schedule>  Biotene Dry Mouth Oral Rinse 5 milliLiter(s) Swish and Spit two times a day  bisacodyl Suppository 10 milliGRAM(s) Rectal daily  budesonide 160 MICROgram(s)/formoterol 4.5 MICROgram(s) Inhaler 2 Puff(s) Inhalation two times a day  buMETAnide 1 milliGRAM(s) Oral daily  chlorhexidine 2% Cloths 1 Application(s) Topical daily  cholecalciferol 2000 Unit(s) Oral daily  dextrose 5%. 1000 milliLiter(s) (50 mL/Hr) IV Continuous <Continuous>  dextrose 50% Injectable 25 Gram(s) IV Push once  diltiazem    milliGRAM(s) Oral daily  insulin glargine Injectable (LANTUS) 20 Unit(s) SubCutaneous at bedtime  insulin lispro (HumaLOG) corrective regimen sliding scale   SubCutaneous three times a day before meals  insulin lispro (HumaLOG) corrective regimen sliding scale   SubCutaneous at bedtime  insulin lispro Injectable (HumaLOG) 10 Unit(s) SubCutaneous three times a day with meals  lactulose Syrup 15 Gram(s) Oral two times a day  montelukast 10 milliGRAM(s) Oral daily  multivitamin 1 Tablet(s) Oral daily  pantoprazole    Tablet 40 milliGRAM(s) Oral before breakfast  polyethylene glycol 3350 17 Gram(s) Oral daily  senna 2 Tablet(s) Oral at bedtime  theophylline ER (24 Hour) 400 milliGRAM(s) Oral daily  tiotropium 18 MICROgram(s) Capsule 1 Capsule(s) Inhalation daily    MEDICATIONS  (PRN):  ALPRAZolam 0.25 milliGRAM(s) Oral two times a day PRN Anxiety  glucagon  Injectable 1 milliGRAM(s) IntraMuscular once PRN Glucose LESS THAN 70 milligrams/deciliter  guaiFENesin   Syrup  (Sugar-Free) 100 milliGRAM(s) Oral every 6 hours PRN Cough

## 2020-08-18 NOTE — PROGRESS NOTE ADULT - SUBJECTIVE AND OBJECTIVE BOX
CARDIOLOGY FOLLOW UP - Dr. Brunson    CC no cp/sob       PHYSICAL EXAM:  T(C): 36.5 (08-18-20 @ 08:45), Max: 36.8 (08-17-20 @ 18:30)  HR: 71 (08-18-20 @ 09:52) (69 - 79)  BP: 110/56 (08-18-20 @ 08:45) (110/56 - 127/50)  RR: 20 (08-18-20 @ 09:51) (18 - 20)  SpO2: 98% (08-18-20 @ 09:52) (96% - 99%)  Wt(kg): --  I&O's Summary    17 Aug 2020 07:01  -  18 Aug 2020 07:00  --------------------------------------------------------  IN: 200 mL / OUT: 1550 mL / NET: -1350 mL    Appearance: Normal	  Cardiovascular: Normal S1 S2,RRR, No JVD, No murmurs  Respiratory: diminished  	  Gastrointestinal:  Soft, Non-tender, + BS	  Extremities: bl le edema ++       MEDICATIONS  (STANDING):  acetaZOLAMIDE Injectable 250 milliGRAM(s) IV Push <User Schedule>  albuterol/ipratropium for Nebulization 3 milliLiter(s) Nebulizer every 6 hours  apixaban 5 milliGRAM(s) Oral every 12 hours  aspirin enteric coated 81 milliGRAM(s) Oral daily  atorvastatin 80 milliGRAM(s) Oral at bedtime  azithromycin   Tablet 250 milliGRAM(s) Oral <User Schedule>  Biotene Dry Mouth Oral Rinse 5 milliLiter(s) Swish and Spit two times a day  bisacodyl Suppository 10 milliGRAM(s) Rectal daily  budesonide 160 MICROgram(s)/formoterol 4.5 MICROgram(s) Inhaler 2 Puff(s) Inhalation two times a day  buMETAnide 1 milliGRAM(s) Oral daily  chlorhexidine 2% Cloths 1 Application(s) Topical daily  cholecalciferol 2000 Unit(s) Oral daily  dextrose 5%. 1000 milliLiter(s) (50 mL/Hr) IV Continuous <Continuous>  dextrose 50% Injectable 25 Gram(s) IV Push once  diltiazem    milliGRAM(s) Oral daily  insulin glargine Injectable (LANTUS) 20 Unit(s) SubCutaneous at bedtime  insulin lispro (HumaLOG) corrective regimen sliding scale   SubCutaneous three times a day before meals  insulin lispro (HumaLOG) corrective regimen sliding scale   SubCutaneous at bedtime  insulin lispro Injectable (HumaLOG) 10 Unit(s) SubCutaneous three times a day with meals  lactulose Syrup 15 Gram(s) Oral two times a day  montelukast 10 milliGRAM(s) Oral daily  multivitamin 1 Tablet(s) Oral daily  pantoprazole    Tablet 40 milliGRAM(s) Oral before breakfast  polyethylene glycol 3350 17 Gram(s) Oral daily  senna 2 Tablet(s) Oral at bedtime  theophylline ER (24 Hour) 400 milliGRAM(s) Oral daily  tiotropium 18 MICROgram(s) Capsule 1 Capsule(s) Inhalation daily      TELEMETRY: 	    ECG:  	  RADIOLOGY:   DIAGNOSTIC TESTING:  [ ] Echocardiogram:  [ ]  Catheterization:  [ ] Stress Test:    OTHER: 	    LABS:	 	                                8.1    9.08  )-----------( 212      ( 18 Aug 2020 10:02 )             27.5     08-18    132<L>  |  91<L>  |  26<H>  ----------------------------<  155<H>  3.6   |  36<H>  |  1.07    Ca    8.9      18 Aug 2020 10:02

## 2020-08-18 NOTE — PROGRESS NOTE ADULT - SUBJECTIVE AND OBJECTIVE BOX
CHIEF COMPLAINT: Patient is a 60y old  Female who presents with a chief complaint of 60F p/w generalized weakness and difficulty walking (14 Aug 2020 15:22)          OVERNIGHT EVENTS    currently on 4L O2    used BIPAP 15/5 overnight and using intermittently in daytime     ROS:  CONSTITUTIONAL: edemetaous  CARDIOVASCULAR: Denies chest pain or palpitations  PULMONARY: Dyspnea, no hemoptysis  [x] REMAINING REVIEW OF SYSTEMS NEGATIVE         MEDICATIONS  (STANDING):  acetaZOLAMIDE Injectable 250 milliGRAM(s) IV Push <User Schedule>  albuterol/ipratropium for Nebulization 3 milliLiter(s) Nebulizer every 6 hours  apixaban 5 milliGRAM(s) Oral every 12 hours  aspirin enteric coated 81 milliGRAM(s) Oral daily  atorvastatin 80 milliGRAM(s) Oral at bedtime  azithromycin   Tablet 250 milliGRAM(s) Oral <User Schedule>  Biotene Dry Mouth Oral Rinse 5 milliLiter(s) Swish and Spit two times a day  bisacodyl Suppository 10 milliGRAM(s) Rectal daily  budesonide 160 MICROgram(s)/formoterol 4.5 MICROgram(s) Inhaler 2 Puff(s) Inhalation two times a day  buMETAnide 1 milliGRAM(s) Oral daily  chlorhexidine 2% Cloths 1 Application(s) Topical daily  cholecalciferol 2000 Unit(s) Oral daily  dextrose 5%. 1000 milliLiter(s) (50 mL/Hr) IV Continuous <Continuous>  dextrose 50% Injectable 25 Gram(s) IV Push once  diltiazem    milliGRAM(s) Oral daily  insulin glargine Injectable (LANTUS) 20 Unit(s) SubCutaneous at bedtime  insulin lispro (HumaLOG) corrective regimen sliding scale   SubCutaneous three times a day before meals  insulin lispro (HumaLOG) corrective regimen sliding scale   SubCutaneous at bedtime  insulin lispro Injectable (HumaLOG) 10 Unit(s) SubCutaneous three times a day with meals  lactulose Syrup 15 Gram(s) Oral two times a day  montelukast 10 milliGRAM(s) Oral daily  multivitamin 1 Tablet(s) Oral daily  pantoprazole    Tablet 40 milliGRAM(s) Oral before breakfast  polyethylene glycol 3350 17 Gram(s) Oral daily  senna 2 Tablet(s) Oral at bedtime  theophylline ER (24 Hour) 400 milliGRAM(s) Oral daily  tiotropium 18 MICROgram(s) Capsule 1 Capsule(s) Inhalation daily       ICU Vital Signs Last 24 Hrs  T(C): 36.5 (18 Aug 2020 08:45), Max: 36.8 (17 Aug 2020 18:30)  T(F): 97.7 (18 Aug 2020 08:45), Max: 98.3 (17 Aug 2020 18:30)  HR: 71 (18 Aug 2020 09:52) (69 - 79)  BP: 110/56 (18 Aug 2020 08:45) (110/56 - 127/50)  BP(mean): --  ABP: --  ABP(mean): --  RR: 20 (18 Aug 2020 09:51) (18 - 20)  SpO2: 98% (18 Aug 2020 09:52) (96% - 99%)        GENERAL:  AAOx3,  able to speak clearly for prolonged duration w/o dyspnea  EYES: anicteric, EOMI  EAR/NOSE/MOUTH/THROAT: NCAT, MMM, nares clear, trachea midline, no thrush  CARDIOVASCULAR: RRR, S1S2, systolic murmur  RESPIRATORY: prolonged expiratory phase, no wheezing  ABDOMEN: soft, obese, NT, ND, +BS  EXTREMITIES:  bilateral LE edema, pitting up to thighs  SKIN: LE wrapped bilaterally, large blister on left foot  MUSCULOSKELETAL: strength diminished  NEUROLOGIC: nonfocal exam, pain in back side                                       8.1    9.08  )-----------( 212      ( 18 Aug 2020 10:02 )             27.5   08-18    132<L>  |  91<L>  |  26<H>  ----------------------------<  155<H>  3.6   |  36<H>  |  1.07    Ca    8.9      18 Aug 2020 10:02            ECHO: < from: Transthoracic Echocardiogram (08.05.20 @ 02:57) >  Mitral Valve: Normal mitral valve. Minimal mitral  regurgitation.  Aortic Valve/Aorta: Aortic valve not well visualized. Peak  transaortic valve gradient equals 13 mm Hg, mean  transaortic valve gradient equals 6 mm Hg, aortic valve  velocity time integral equals 30 cm. Peak left ventricular  outflow tract gradient equals 5 mm Hg, mean gradient is  equal to 2 mm Hg, LVOT velocity time integral equals 15 cm.  Aortic Root: 3.4 cm.  Left Atrium: Normal left atrium.  Left Ventricle: Normal left ventricular systolic function.  No segmental wall motion abnormalities. Normal left  ventricular internal dimensions and wall thicknesses. Mild  diastolic dysfunction (Stage I).  Right Heart: Normal right atrium. Normal right ventricular  size and systolic function. Normal tricuspid valve.  Pericardium/Pleura: Normal pericardium with no pericardial  effusion.  Hemodynamic: Estimated right atrial pressure is 8 mm Hg.  ------------------------------------------------------------------------  Conclusions:  1. Normal left ventricular internal dimensions and wall  thicknesses.  2. Normal left ventricular systolic function. No segmental  wall motion abnormalities.  3. Mild diastolic dysfunction (Stage I).  4. Normal right ventricular size and systolic function.    < end of copied text >

## 2020-08-18 NOTE — PROGRESS NOTE ADULT - ASSESSMENT
59 y/o F with PMH of COPD on home oxygen 4L NC, AF on Eliquis, CAD s/p PCI p/w acute on chronic respiratory failure. Cont to be grossly volume overloaded but feels her dyspnea has improved since admission       - cont diuresis w/ bumex  -monitor I/O  - d/w primary team to d/c diamox  - monitor serum bicarb  - cont nocturnal bipap which provides her significant relief but also requiring intermittently in daytime- goal to decrease use in daytime   - cont NC daytime goal sat 90-95%  - complete prednisone  - cont spiriva and symbicort  - cont theophylline  - cont nebulizer tx  - on azithro MWF  - DVT ppx - on a/c for AF  -ultimately will need to follow w/ [facility] for lung transplant evaluation  - cont physical therapy  - CT from 2019 shows nodule vs more likely atelectasis in RLL (nonurgent CT chest to reevaluate, can be done as outpt when pt more stable and can be done at [facility] as part of their Transplant eval)

## 2020-08-18 NOTE — PROGRESS NOTE ADULT - SUBJECTIVE AND OBJECTIVE BOX
Podiatry pager #: [phone]/ [phone]    Patient is a 60y old  Female who presents with a chief complaint of 60F p/w generalized weakness and difficulty walking (18 Aug 2020 16:06) Podiatry consulted for blister left foot      HPI:  60F w/ end stage COPD on 3LNC (pending transplant list at [facility]), former smoker, CAD s/p stent (2016), afib on eliquis, uncontrolled DM2, raynaud phenomenon and recent hospitalization at [facility] for altered mental status and ARTURO presents to [facility] for evaluation of generalized weakness and difficulty walking. Patient reports that she was recently hospitalized at [facility] from 7/25-7/31 for confusion and ARTURO. At that time, she was told by her brother (lives downstairs in home) that she was not herself and did not recognize him which is why she was sent to hospital. Per chart, Dr. Mathew (PCP) has chart notes regarding possible hallucinations. While at [facility] she had head CT reported to be normal and she declined MR brain. Her Arturo (Cr increased to 1.5) was reported to resolve with IV fluid. She was discharged to home 1d prior to presentation to [facility] and reports since discharge she has had generalized weakness, inability to walk due to leg heaviness/weakness L>R, and sensation that she has retained fluid in her legs. At baseline she walks with a walker and per chart review her PCP was concerned this month that the patient was developing steroid-induced myopathy and planned on having patient evaluated by neurology as well as obtain MRI at [facility]. No reported fall, head trauma, seizure, paralysis, loss of sensation in extremities or saddle anesthesia, fever, chills, cp, cough, worsening baseline sob (uses 3LNC), n/v/d, inability to urinate or urinary incontinence, constipation or bowel incontinence, or rash (has bruised on arm form needle stick from recent hospitalization).    DENIES allergy to albuterol- never rash, no anaphylaxis per patient  In the ED, VS 97.5, 138/76, 69, 18 98%3LNC (01 Aug 2020 22:05)      PAST MEDICAL & SURGICAL HISTORY:  Atrial fibrillation  Hyperlipemia  Chronic sinusitis  Raynaud phenomenon  HTN (hypertension)  DM (diabetes mellitus)  Claustrophobia  COPD (chronic obstructive pulmonary disease)  S/P tonsillectomy      MEDICATIONS  (STANDING):  albuterol/ipratropium for Nebulization 3 milliLiter(s) Nebulizer every 6 hours  apixaban 5 milliGRAM(s) Oral every 12 hours  aspirin enteric coated 81 milliGRAM(s) Oral daily  atorvastatin 80 milliGRAM(s) Oral at bedtime  azithromycin   Tablet 250 milliGRAM(s) Oral <User Schedule>  Biotene Dry Mouth Oral Rinse 5 milliLiter(s) Swish and Spit two times a day  bisacodyl Suppository 10 milliGRAM(s) Rectal daily  budesonide 160 MICROgram(s)/formoterol 4.5 MICROgram(s) Inhaler 2 Puff(s) Inhalation two times a day  buMETAnide 1 milliGRAM(s) Oral daily  chlorhexidine 2% Cloths 1 Application(s) Topical daily  cholecalciferol 2000 Unit(s) Oral daily  dextrose 5%. 1000 milliLiter(s) (50 mL/Hr) IV Continuous <Continuous>  dextrose 50% Injectable 25 Gram(s) IV Push once  diltiazem    milliGRAM(s) Oral daily  insulin glargine Injectable (LANTUS) 20 Unit(s) SubCutaneous at bedtime  insulin lispro (HumaLOG) corrective regimen sliding scale   SubCutaneous three times a day before meals  insulin lispro (HumaLOG) corrective regimen sliding scale   SubCutaneous at bedtime  insulin lispro Injectable (HumaLOG) 8 Unit(s) SubCutaneous three times a day with meals  lactulose Syrup 15 Gram(s) Oral two times a day  montelukast 10 milliGRAM(s) Oral daily  multivitamin 1 Tablet(s) Oral daily  pantoprazole    Tablet 40 milliGRAM(s) Oral before breakfast  polyethylene glycol 3350 17 Gram(s) Oral daily  senna 2 Tablet(s) Oral at bedtime  theophylline ER (24 Hour) 400 milliGRAM(s) Oral daily  tiotropium 18 MICROgram(s) Capsule 1 Capsule(s) Inhalation daily    MEDICATIONS  (PRN):  ALPRAZolam 0.25 milliGRAM(s) Oral two times a day PRN Anxiety  glucagon  Injectable 1 milliGRAM(s) IntraMuscular once PRN Glucose LESS THAN 70 milligrams/deciliter  guaiFENesin   Syrup  (Sugar-Free) 100 milliGRAM(s) Oral every 6 hours PRN Cough      Allergies    No Known Allergies    Intolerances    albuterol (Unknown)      VITALS:    Vital Signs Last 24 Hrs  T(C): 36.5 (18 Aug 2020 08:45), Max: 36.8 (17 Aug 2020 18:30)  T(F): 97.7 (18 Aug 2020 08:45), Max: 98.3 (17 Aug 2020 18:30)  HR: 71 (18 Aug 2020 09:52) (69 - 79)  BP: 110/56 (18 Aug 2020 08:45) (110/56 - 127/50)  BP(mean): --  RR: 20 (18 Aug 2020 09:51) (18 - 20)  SpO2: 98% (18 Aug 2020 09:52) (96% - 99%)    LABS:                          8.1    9.08  )-----------( 212      ( 18 Aug 2020 10:02 )             27.5       08-18    132<L>  |  91<L>  |  26<H>  ----------------------------<  155<H>  3.6   |  36<H>  |  1.07    Ca    8.9      18 Aug 2020 10:02        CAPILLARY BLOOD GLUCOSE      POCT Blood Glucose.: 246 mg/dL (18 Aug 2020 14:15)  POCT Blood Glucose.: 229 mg/dL (18 Aug 2020 11:51)  POCT Blood Glucose.: 152 mg/dL (18 Aug 2020 08:53)  POCT Blood Glucose.: 156 mg/dL (17 Aug 2020 20:40)  POCT Blood Glucose.: 80 mg/dL (17 Aug 2020 18:48)  POCT Blood Glucose.: 53 mg/dL (17 Aug 2020 18:11)  POCT Blood Glucose.: 52 mg/dL (17 Aug 2020 18:09)          LOWER EXTREMITY PHYSICAL EXAM:    Vascular: DP/PT n/p, B/L with known h/o raynauds, CFT <_5 seconds B/L, Temperature gradient _warm to cool, B/L.   Neuro: Epicritic sensation intact to the level of _toes, B/L.  Skin: Postive mycotic toenails x10  Wound #1:   Location: dorsum left forefoot  Size: 6cm diameter  Depth: superficial  Wound bed: bullae  Drainage:  @30cc clear serous fluid/fluctuant  Odor: none  Periwound: no clinical signs of infection  Etiology: bullae/dm

## 2020-08-18 NOTE — PROGRESS NOTE ADULT - ASSESSMENT
Assessment/Plan:    --Left dm foot bullae secondary to fluid retention    --aseptic incisional drainage of left dorsal foot bullae with betadine/ adaptic touch and dsd applied  --recommend continued daily betadine paint with adaptic and dsd left forefoot daily  --will monitor for signs of infection  --recommend continue podiatric footcare as outpatient with current dpm  --thank you for consult

## 2020-08-18 NOTE — PROGRESS NOTE ADULT - ASSESSMENT
EVAN 1/7/19: no ALINA thrombus, unable to assess LV sys fx  echo 12/7/18: EF 71%, nl LV sys fx, mild diastolic dysfx     a/p    60 year old female with hx of D CHF, HTN, CAD s/p PCI, Afib/ aflutter, s/p Aflutter Ablation 12/2019, COPD, DM2, Anxiety, , raynaud phenomenon presenting with weakness, SOB , hyperkalemia.      1. Dyspnea, Resp failure  -secondary to chronic lung disease, COPD, volume overload  -pulm f/u   -covid 19 negative  -no acs , cv stable   -ecg with known RBBB, new twi noted, hyperkalemia also noted on admission  -echo with normal LVEF, mild diastolic dysfunction   -s/p PO Prednisone  -on Duoneb, Symbicort, Spiriva and Theophylline.    2. CAD s/p PCI   -stable, no cp  -c/w ASA, statin  -echo noted above     3. Afib/aflutter s/p  Aflutter Ablation 12/2019  -in NSR, cw cardizem  mg daily  -CHADsVac=2, c/w eliquis     4. Acute on Chronic Diastolic CHF   -dyspnea secondary to copd, maxwell chf  -on bipap mostly at night   -echo with normal lVEF   -Has been on Diamox with poor response  -Restarted on IV Lasix, but held for worsening Cr and metabolic alkalosis   -creat stable, LE edema improving , net ouput neg  -continue bumex 1mg daily  -cont diamox per pulmo  -will closely monitor bicarb      dvt ppx

## 2020-08-18 NOTE — PROGRESS NOTE ADULT - ASSESSMENT
60F w COPD on 3LNC (?pending transplant list at [facility]), former smoker, CAD s/p stent (2016), afib on eliquis, uncontrolled DM2, raynaud phenomenon and recent hospitalization at [facility] for altered mental status and [facility] aw COPD exacerbation, LE swelling, weakness.     1. Acute COPD exacerbation- Dyspnea multifactorial in the setting of underlying lung disease, cardiovascular dysfunction, obesity, and deconditioning.     On Prednisone taper. Overnight and prn Bipap. Pt comfortable off Bipap and able to speak in full sentences.     Pt belligerent and uncooperative during hospitalization, has refused to see many doctors including 2 pulmonary teams- now agreeable to see house pulm again, following.    Continue supplemental O2 with goal O2 sat > 88% - she does not need to be at 100%.    Pt refusing to discuss discharge planning. CM to discuss possible rehab.     2. Acute diastolic CHF- Has been on Diamox with poor response. Restarted on IV Lasix, but developed worsening Cr and metabolic alkalosis, lasix held. Now on Bumex w improvement in LE edema. Continue. Diamox dced.    Pulm- patient has a primary respiratory acidosis with a secondary metabolic alkalosis. Her pH and serum bicarb should be monitored closely. Her serum bicarb was previously in the 40s and at this point it is less than that. I do think furosemide or bumex would be a better diuretic as long as her serum bicarb is less than 40.    3. DM2- A1C 7.5. Lantus and premeal insulin.     4. Paroxysmal a fib- Now in NSR. On Apixiban, Cardizem.

## 2020-08-18 NOTE — ED CLERICAL - BED REQUESTED
[facility] AMBULATORY ENCOUNTER  HEMATOLOGY/ONCOLOGY PROGRESS NOTE      SOURCE OF INFORMATION:  Patient and [facility] electronic medical record    CHIEF COMPLAINT:  Blood Disorder (Thrombophilia )      HISTORY OF PRESENT ILLNESS:  Magno Orozco is a 79 year old male seen today for anticoagulation in the setting of a hypercoagulable state.  The patient was initially seen by Dr. Croft and then transferred care to Dr. Roe.  His anticoagulation is managed by the anticoagulation clinic and he remains on chronic warfarin.  There is some question regarding compliance due to possible misunderstanding of communication and/or lack of attentiveness per Dr. Roe.    He has been on chronic anticoagulation since 2008, at which time a IVC filter was placed as well , and has seen Dr. Roe once every 3 months.    He was initially seen by Dr. Croft on 09/15/2008 for possible recurrent clot formation while on anticoagulants.   He presented with dyspnea and chest pain and was admitted in August of that year with findings on imaging concerning for pulmonary embolism.  There were bilateral pulmonary emboli and the cause was felt to be idiopathic. Prior to the admission, he had been diagnosed with AV brinda reentrant tachycardia and underwent an ablation procedure but otherwise had no prolonged immobility, trauma or procedures.  He was treated with twice daily Lovenox injections and was admitted less than a month later with recurrent symptoms but V/Q scan was unchanged per Dr. Croft's notes.    Thrombophilia labs performed at that time were negative except for a treatment related decreased protein C level per Dr. Croft's notes.  However, I am unable to find any results of those labs.  JAK2 DNA mutation testing was negative.    His history was also notable for abdominal pain followed by documented ischemic colitis in the late spring of 2008. This was felt related to drug-induced hypotension.    He had a myocardial infarction in 1993  01-Aug-2020 21:22 and 2001.     He plans oral surgery with extraction of 7 teeth tomorrow.    He notes no unexplained bleeding or bruising.  No unexplained pains, headaches, ongoing dyspnea, persistent drenching sweats, abnormal weight loss.  He tolerates warfarin well and has had no bleeding issues.    MEDICATIONS AND ALLERGIES:    Current Outpatient Medications   Medication Sig Dispense Refill   • pregabalin (LYRICA) 50 MG capsule Take 1 capsule by mouth 3 times daily. Indications: foot pain     • ammonium lactate (AMLACTIN) 12 % lotion Apply topically as needed for Dry Skin.     • DULoxetine (CYMBALTA) 30 MG capsule Take 30 mg by mouth daily. Pt takes 20 mg am and 60 mg PM     • Multiple Vitamins-Minerals (CENTRUM SILVER) tablet Take 1 tablet by mouth daily.     • carvedilol (COREG) 3.125 MG tablet Take 3.125 mg by mouth 2 times daily (with meals).     • Red Yeast Rice 600 MG CAPS Take 1 capsule by mouth daily.     • Calcium 600 MG tablet Take 1 tablet by mouth daily.     • tamsulosin (FLOMAX) 0.4 MG CAPS Take 1 capsule by mouth daily after a meal. 30 capsule    • finasteride (PROSCAR) 5 MG tablet Take 1 tablet by mouth daily.     • pentoxifylline (TRENTAL) 400 MG CR tablet Take 400 mg by mouth 3 times daily (with meals).     • levothyroxine (SYNTHROID, LEVOTHROID) 25 MCG tablet Takes 50 mcg daily now     • Cholecalciferol (VITAMIN D3) 2000 UNITS TABS Take 1 tablet by mouth daily.     • Omega-3 Fatty Acids (OMEGA-3 FISH OIL) 1200 MG OR CAPS 2 caps twice per day 0 0   • warfarin (COUMADIN) 5 MG tablet TAKE 2 TABLETS BY MOUTH DAILY OR AS DIRECTED. BLOOD THINNER. 180 tablet 1   • aspirin 81 MG tablet Take 81 mg by mouth daily.     • acetaminophen (TYLENOL) 650 MG CR tablet Take 1 tablet by mouth every 8 hours as needed for Pain. 30 tablet 0   • Triamcinolone Acetonide (NASACORT AQ NA)      • nitroGLYcerin (NITROSTAT) 0.4 MG sublingual tablet Place 0.4 mg under the tongue every 5 minutes as needed for Chest pain.     • warfarin  (COUMADIN) 1 MG tablet TAKE 1 TABLET BY MOUTH DAILY MAY TAKE UP TO 15 TABLETS DAILY AS DIRECTED BY [employer] [facility] 450 tablet 3   • diphenhydrAMINE (BENADRYL) 25 MG capsule Take 50 mg by mouth nightly as needed.      • meclizine (ANTIVERT) 50 MG tablet Take 1 tablet by mouth 3 times daily as needed for Dizziness. 30 tablet 0     No current facility-administered medications for this visit.      ALLERGIES:   Allergen Reactions   • Doxycycline Hyclate DIARRHEA     C-Diff   • Bee SWELLING     Arm swollen   • Cefazolin Other (See Comments)     C-DIFF x2 patient states fainted   • Cephalexin Other (See Comments)     C-diff   • Crestor [Rosuvastatin Calcium] Other (See Comments)     LH, muscle pain & joint pain, fatigue    • Gabapentin DIARRHEA     Back pain   • Grass RASH   • Indomethacin DIZZINESS   • Isordil [Isosorbide Dinitrate] HEADACHES     Severe headaches   • Metoprolol Other (See Comments)     Made ringing in ears worse and had insomnia   • Niaspan [Niacin (Antihyperlipidemic)] DIARRHEA and Nausea & Vomiting     Niaspan extended-release, per pt BP dropped, precipitated by diarrhea,syncope and intestinal bleed   • Nitroglycerin HEADACHES     Nitroglycerin derivatives gives him headaches, no allergy   • Omeprazole Other (See Comments)     Severe back pain   • Paxil [Paroxetine] HEADACHES   • Persantine HEADACHES     Severe headache   • Simvastatin VISUAL DISTURBANCE     blured vision, LH, fatigue, muscle pain, joint ache   • Welchol [Colesevelam Hcl] Other (See Comments)     Joint pain     • Zetia [Ezetimibe] Other (See Comments)     Foot pain       PROBLEM LIST:  Patient Active Problem List   Diagnosis   • Right Plantar Fasciitis   • Long term (current) use of anticoagulants   • Other and unspecified coagulation defects   • Anemia, normocytic normochromic   • SVT (supraventricular tachycardia) (AVNRT)   • Pre-syncope (6/2001)   • CAD (coronary artery disease)   • AF (paroxysmal atrial fibrillation) (dx  4/2001)   • Thrombophilia (CMS/[facility])   • Therapeutic drug monitoring   • Pulmonary embolism without acute cor pulmonale (CMS/[facility])        HISTORIES:  Past medical history, surgical history, family history, and social history were reviewed and updated.  Past Medical History:   Diagnosis Date   • Abnormal tilt table test 6/4/2001    positive after 27 mins with VD syncope   • AF (atrial fibrillation) (CMS/[facility])    • AF (paroxysmal atrial fibrillation) (dx 4/2001) 12/7/2015   • Amaurosis fugax    • Angiomyolipoma of kidney    • Arteriosclerotic heart disease (ASHD)    • Atrial fibrillation (CMS/[facility]) 2008   • Benign paroxysmal vertigo    • Bilateral pulmonary embolism (CMS/[facility]) 8/20/2008    IVC filte placed   • BPH    • Brain tumor (benign) (CMS/[facility]) 1995    Affects balance   • C. difficile diarrhea 09/06/2016   • CAD (coronary artery disease) 12/4/2014 2001- CABG   • Cardiovascular disease    • Carotid artery stenosis    • Cervical disc disease    • Chronic rhinitis    • Cognitive decline     Mild   • Complex tear of medial meniscus of left knee     Complex internal dearangement of the left knee to include acute trauma and a complex medial meniscal tear with a horizontal oblique tear, a parameniscal cyst, and some partial-thickness cartilage wear and tear.   • Depression    • Diverticulosis    • Erectile dysfunction    • Heel spur 1995    Left   • Hyperlipidemia    • Hypertension    • Hypothyroidism    • IGT (impaired glucose tolerance)    • Inguinal hernia 1999   • Insomnia     fatigue   • Ischemic colitis (CMS/[facility]) 01/01/2008   • Long term (current) use of anticoagulants    • Memory impairment     age-related   • Meniere's disease    • Mitral valve disorders(424.0)     History of mitral valve prolpase   • Old myocardial infarction 1/31/93, 10/11/01,12/2013   • Osteoarthritis    • Osteopenia    • Other and unspecified coagulation defects    • Peripheral vascular disease (CMS/[facility])    • Plantar fasciitis    • Pneumonia     • Pre-syncope    • Pulmonary embolism (CMS/[facility]) 08/20/2008    bilateral   • Rotator Cuff Syndrome 6/2013    Bilateral shoulder   • Rotator cuff tear     Bilateral   • Sinusitis, chronic    • SNHL (sensorineural hearing loss)    • SVT (supraventricular tachycardia) (CMS/[facility]) 01/01/2008   • Syncope and collapse     vasodepressor syncope   • Thrombophilia (CMS/[facility])     ??cause   • Tinnitus    • Unspecified cardiovascular disease    • Vestibular neuronitis      Past Surgical History:   Procedure Laterality Date   • Colonoscopy  06/01/2004   • Colonoscopy w biopsy  06/24/2008    ischemic colitis   • Coronary artery bypass graft  10/2001    double bypass . 1. SVG to the LAD. 2. SVG to the R3.   • Discission,2nd cataract,laser  8/2010    Yag Laser/Second Cataract-Left eye   • Foot neuroma surgery Left 1990   • Foot neuroma surgery Right 1991   • Foot/toes surgery proc unlisted  1982    right inside heel infection surgically removed.   • Foot/toes surgery proc unlisted  1986    mortons neuroma removed both feet.   • Foot/toes surgery proc unlisted  1990    heel spur removed left foot   • Ivc filter placement  09/12/2008    TrapEase IVC Filter inserted into Vena Cava   • Knee scope,diagnostic Left 9/14/2015    meniscectomy   • Left heart cath,percutaneous  3/03,12/2013    Cardiac Cath- no blockages.   • Myocardial perf panel  11/02    showed old scars from previous heart attacks, no new blockages.   • Past surgical history  11/05, 2005    bone graft dental surgery.   • Penile prosthesis implant  12/17/2012    Dr Gary Irizarry   • Radiofrequency ablation  08/15/2008    AVNRT   • Remv cataract extracap insert lens  L-2006;R-2007    BilateralCataract Removal Lens Implant   • Repair ing hernia,5+y/o,reducibl  1999    Hernia, inguinal   • Rotator cuff repair Right 09/23/2013   • Sinus surgery proc unlisted  1995     Family History   Problem Relation Age of Onset   • Dementia/Alzheimers Mother         Alzheimer's disease   •  Heart Father    • Blood Disorder Sister         Sickle cell anemia   • Stroke Daughter    • Other Other         no major family medical problems   • Heart Maternal Grandmother         Cardiovascular disease   • Heart Maternal Grandfather         Cardiovascular disease   • Hypertension Neg Hx    • Diabetes Neg Hx    • Cancer Neg Hx    • Tuberculosis Neg Hx      Social History     Tobacco Use   • Smoking status: Former Smoker     Packs/day: 2.50     Years: 20.00     Pack years: 50.00     Types: Cigarettes     Quit date: 1985     Years since quittin.6   • Smokeless tobacco: Never Used   Substance Use Topics   • Alcohol use: No   • Drug use: No       REVIEW OF SYSTEMS:    All other systems are reviewed and are negative except as documented in the history of present illness.    PHYSICAL EXAM:  Vital Signs:   Oncology Encounter Vitals [20 1347]   ONC OP Encounter Vitals Group      BP       Pulse       Resp       Temp       Temp src       SpO2       Weight 212 lb 11.2 oz (96.5 kg)      Height       Pain Score  0      Pain Location       Pain Education?       BSA (Calculated - m2) - Lucia & Lucia       BMI (Calculated)        QOPI Data:     Oral Chemo: NA.    Psychiatric ROS: Negative for change in affect, anxiety, depression, insomnia, mentation or sleep disturbance.    ECOG Performance Status:   ECOG [20 1344]   ECOG Performance Status 1     General: The patient is alert, well-developed, well-nourished, no distress.  Skin: Warm, normal color, normal texture, normal turgor and without rash.  Head: Normocephalic, atraumatic.  Eyes: Normal conjunctivae and sclerae. Pupils equal, round.  Neurologic: Motor strength normal, coordination normal, no tremor noted.  Psychiatric: Cooperative. Appropriate mood and affect. Normal judgment.  Due to guidance in the setting of the coronavirus/COVID-19 pandemic, physical exam was deferred in an asymptomatic patient.    LABORATORY DATA:  Recent Labs   Lab  08/18/20  1354   WBC 5.6   RBC 4.35*   HGB 13.6   HCT 38.9*   MCV 89.4      Absolute Neutrophils 3.0   Absolute Lymphocytes 1.8   Absolute Monocytes 0.4   Absolute Eosinophils  0.4   Absolute Basophils 0.1     No results for input(s): GLUCOSE, SODIUM, POTASSIUM, CHLORIDE, CO2, BUN, CREATININE, CALCIUM, MG, TOTPROTEIN, ALBUMIN, AST, ALKPT, GPT in the last 8765 hours.  No results for input(s): ANIONGAP, GLOB, LDH, BILIRUBIN in the last 8765 hours.    IMAGING STUDIES:  Imaging studies reviewed. The pertinent positives are reviewed as above.    ASSESSMENT:  1.  Coronary artery disease  2.  Cardiac arrhythmia  3.  Peripheral vascular disease  4.  History of idiopathic bilateral pulmonary embolus on chronic anticoagulation with prior negative thrombophilia evaluation per reviewed documents. By his description, his prior bilateral pulmonary embolus was idiopathic and lifelong anticoagulation is a reasonable consideration, especially in the setting of his other vascular disease.   We discussed that if the risk of bleeding is felt in the future to exceed the risk of blood clot, either due to progressive frailty, progressive cognitive decline resulting in difficulty with medication compliance, risk of falls or acute events such as GI bleeding, anticoagulation could be stopped.  For now, however, he is tolerating anticoagulation well and will continue on the same.   Since seeing a hematologist does not change current recommendations and he is well cared for by Dr. Brown and his current anticoagulation clinic, I offered to see him back as needed.  He felt uncomfortable with this and asked to see me in 6 months, which will be arranged.    5.  Mild cognitive impairment      The patient indicated understanding of the diagnosis and agreed with the plan of care.    A copy of this note was sent to the requesting provider.

## 2020-08-18 NOTE — PROGRESS NOTE ADULT - ATTENDING COMMENTS
Agree with above NP note.  cv stable  continue bumex as ordered  maintain. neg output   monitor bmp  renal fxn, hco3 stable

## 2020-08-19 ENCOUNTER — RX RENEWAL (OUTPATIENT)
Age: 60
End: 2020-08-19

## 2020-08-19 LAB
ANION GAP SERPL CALC-SCNC: 9 MMOL/L — SIGNIFICANT CHANGE UP (ref 5–17)
BUN SERPL-MCNC: 26 MG/DL — HIGH (ref 7–23)
CALCIUM SERPL-MCNC: 9 MG/DL — SIGNIFICANT CHANGE UP (ref 8.4–10.5)
CHLORIDE SERPL-SCNC: 93 MMOL/L — LOW (ref 96–108)
CO2 SERPL-SCNC: 35 MMOL/L — HIGH (ref 22–31)
CREAT SERPL-MCNC: 1.06 MG/DL — SIGNIFICANT CHANGE UP (ref 0.5–1.3)
GLUCOSE BLDC GLUCOMTR-MCNC: 134 MG/DL — HIGH (ref 70–99)
GLUCOSE BLDC GLUCOMTR-MCNC: 164 MG/DL — HIGH (ref 70–99)
GLUCOSE BLDC GLUCOMTR-MCNC: 219 MG/DL — HIGH (ref 70–99)
GLUCOSE BLDC GLUCOMTR-MCNC: 235 MG/DL — HIGH (ref 70–99)
GLUCOSE SERPL-MCNC: 142 MG/DL — HIGH (ref 70–99)
HCT VFR BLD CALC: 26.4 % — LOW (ref 34.5–45)
HGB BLD-MCNC: 7.9 G/DL — LOW (ref 11.5–15.5)
MCHC RBC-ENTMCNC: 25 PG — LOW (ref 27–34)
MCHC RBC-ENTMCNC: 29.9 GM/DL — LOW (ref 32–36)
MCV RBC AUTO: 83.5 FL — SIGNIFICANT CHANGE UP (ref 80–100)
NRBC # BLD: 0 /100 WBCS — SIGNIFICANT CHANGE UP (ref 0–0)
PLATELET # BLD AUTO: 232 K/UL — SIGNIFICANT CHANGE UP (ref 150–400)
POTASSIUM SERPL-MCNC: 3.8 MMOL/L — SIGNIFICANT CHANGE UP (ref 3.5–5.3)
POTASSIUM SERPL-SCNC: 3.8 MMOL/L — SIGNIFICANT CHANGE UP (ref 3.5–5.3)
RBC # BLD: 3.16 M/UL — LOW (ref 3.8–5.2)
RBC # FLD: 14.6 % — HIGH (ref 10.3–14.5)
SODIUM SERPL-SCNC: 137 MMOL/L — SIGNIFICANT CHANGE UP (ref 135–145)
WBC # BLD: 8.96 K/UL — SIGNIFICANT CHANGE UP (ref 3.8–10.5)
WBC # FLD AUTO: 8.96 K/UL — SIGNIFICANT CHANGE UP (ref 3.8–10.5)

## 2020-08-19 PROCEDURE — 99232 SBSQ HOSP IP/OBS MODERATE 35: CPT

## 2020-08-19 RX ORDER — INSULIN LISPRO 100/ML
3 VIAL (ML) SUBCUTANEOUS ONCE
Refills: 0 | Status: COMPLETED | OUTPATIENT
Start: 2020-08-19 | End: 2020-08-19

## 2020-08-19 RX ORDER — ACETAMINOPHEN 500 MG
1000 TABLET ORAL ONCE
Refills: 0 | Status: COMPLETED | OUTPATIENT
Start: 2020-08-19 | End: 2020-08-19

## 2020-08-19 RX ORDER — ALPRAZOLAM 0.25 MG
0.25 TABLET ORAL
Refills: 0 | Status: DISCONTINUED | OUTPATIENT
Start: 2020-08-19 | End: 2020-08-24

## 2020-08-19 RX ADMIN — APIXABAN 5 MILLIGRAM(S): 2.5 TABLET, FILM COATED ORAL at 18:40

## 2020-08-19 RX ADMIN — Medication 1000 MILLIGRAM(S): at 23:00

## 2020-08-19 RX ADMIN — Medication 3 MILLILITER(S): at 18:39

## 2020-08-19 RX ADMIN — Medication 400 MILLIGRAM(S): at 14:30

## 2020-08-19 RX ADMIN — Medication 2000 UNIT(S): at 14:16

## 2020-08-19 RX ADMIN — Medication 5 MILLILITER(S): at 18:39

## 2020-08-19 RX ADMIN — APIXABAN 5 MILLIGRAM(S): 2.5 TABLET, FILM COATED ORAL at 05:31

## 2020-08-19 RX ADMIN — ATORVASTATIN CALCIUM 80 MILLIGRAM(S): 80 TABLET, FILM COATED ORAL at 21:43

## 2020-08-19 RX ADMIN — BUDESONIDE AND FORMOTEROL FUMARATE DIHYDRATE 2 PUFF(S): 160; 4.5 AEROSOL RESPIRATORY (INHALATION) at 06:01

## 2020-08-19 RX ADMIN — Medication 180 MILLIGRAM(S): at 05:31

## 2020-08-19 RX ADMIN — Medication 3 UNIT(S): at 19:08

## 2020-08-19 RX ADMIN — PANTOPRAZOLE SODIUM 40 MILLIGRAM(S): 20 TABLET, DELAYED RELEASE ORAL at 05:31

## 2020-08-19 RX ADMIN — BUMETANIDE 1 MILLIGRAM(S): 0.25 INJECTION INTRAMUSCULAR; INTRAVENOUS at 05:31

## 2020-08-19 RX ADMIN — MONTELUKAST 10 MILLIGRAM(S): 4 TABLET, CHEWABLE ORAL at 14:16

## 2020-08-19 RX ADMIN — INSULIN GLARGINE 20 UNIT(S): 100 INJECTION, SOLUTION SUBCUTANEOUS at 21:43

## 2020-08-19 RX ADMIN — Medication 2: at 10:14

## 2020-08-19 RX ADMIN — Medication 2: at 14:32

## 2020-08-19 RX ADMIN — Medication 8 UNIT(S): at 10:13

## 2020-08-19 RX ADMIN — Medication 1 TABLET(S): at 14:17

## 2020-08-19 RX ADMIN — Medication 81 MILLIGRAM(S): at 14:19

## 2020-08-19 RX ADMIN — Medication 3 MILLILITER(S): at 14:14

## 2020-08-19 RX ADMIN — TIOTROPIUM BROMIDE 1 CAPSULE(S): 18 CAPSULE ORAL; RESPIRATORY (INHALATION) at 14:14

## 2020-08-19 RX ADMIN — Medication 400 MILLIGRAM(S): at 22:52

## 2020-08-19 RX ADMIN — BUDESONIDE AND FORMOTEROL FUMARATE DIHYDRATE 2 PUFF(S): 160; 4.5 AEROSOL RESPIRATORY (INHALATION) at 18:39

## 2020-08-19 RX ADMIN — Medication 3 MILLILITER(S): at 05:31

## 2020-08-19 RX ADMIN — AZITHROMYCIN 250 MILLIGRAM(S): 500 TABLET, FILM COATED ORAL at 18:40

## 2020-08-19 RX ADMIN — Medication 8 UNIT(S): at 14:32

## 2020-08-19 NOTE — PROGRESS NOTE ADULT - SUBJECTIVE AND OBJECTIVE BOX
CARDIOLOGY FOLLOW UP - Dr. Brunson    CC c.o SOB, no cp        PHYSICAL EXAM:  T(C): 37.1 (08-19-20 @ 05:08), Max: 37.1 (08-18-20 @ 21:17)  HR: 92 (08-19-20 @ 06:21) (72 - 101)  BP: 137/72 (08-19-20 @ 05:08) (137/72 - 138/59)  RR: 19 (08-19-20 @ 05:08) (19 - 21)  SpO2: 95% (08-19-20 @ 06:21) (95% - 100%)  Wt(kg): --  I&O's Summary    18 Aug 2020 07:01  -  19 Aug 2020 07:00  --------------------------------------------------------  IN: 690 mL / OUT: 2200 mL / NET: -1510 mL    19 Aug 2020 07:01  -  19 Aug 2020 11:46  --------------------------------------------------------  IN: 0 mL / OUT: 700 mL / NET: -700 mL        Appearance: Normal	  Cardiovascular: Normal S1 S2,RRR   Respiratory:  diminished    Gastrointestinal:  Soft, Non-tender, + BS	  Extremities: bl le edema ++      MEDICATIONS  (STANDING):  albuterol/ipratropium for Nebulization 3 milliLiter(s) Nebulizer every 6 hours  apixaban 5 milliGRAM(s) Oral every 12 hours  aspirin enteric coated 81 milliGRAM(s) Oral daily  atorvastatin 80 milliGRAM(s) Oral at bedtime  azithromycin   Tablet 250 milliGRAM(s) Oral <User Schedule>  Biotene Dry Mouth Oral Rinse 5 milliLiter(s) Swish and Spit two times a day  bisacodyl Suppository 10 milliGRAM(s) Rectal daily  budesonide 160 MICROgram(s)/formoterol 4.5 MICROgram(s) Inhaler 2 Puff(s) Inhalation two times a day  buMETAnide 1 milliGRAM(s) Oral daily  chlorhexidine 2% Cloths 1 Application(s) Topical daily  cholecalciferol 2000 Unit(s) Oral daily  dextrose 5%. 1000 milliLiter(s) (50 mL/Hr) IV Continuous <Continuous>  dextrose 50% Injectable 25 Gram(s) IV Push once  diltiazem    milliGRAM(s) Oral daily  insulin glargine Injectable (LANTUS) 20 Unit(s) SubCutaneous at bedtime  insulin lispro (HumaLOG) corrective regimen sliding scale   SubCutaneous three times a day before meals  insulin lispro (HumaLOG) corrective regimen sliding scale   SubCutaneous at bedtime  insulin lispro Injectable (HumaLOG) 8 Unit(s) SubCutaneous three times a day with meals  lactulose Syrup 15 Gram(s) Oral two times a day  montelukast 10 milliGRAM(s) Oral daily  multivitamin 1 Tablet(s) Oral daily  pantoprazole    Tablet 40 milliGRAM(s) Oral before breakfast  polyethylene glycol 3350 17 Gram(s) Oral daily  senna 2 Tablet(s) Oral at bedtime  theophylline ER (24 Hour) 400 milliGRAM(s) Oral daily  tiotropium 18 MICROgram(s) Capsule 1 Capsule(s) Inhalation daily      TELEMETRY: 	    ECG:  	  RADIOLOGY:   DIAGNOSTIC TESTING:  [ ] Echocardiogram:  [ ]  Catheterization:  [ ] Stress Test:    OTHER: 	    LABS:	 	                            7.9    8.96  )-----------( 232      ( 19 Aug 2020 07:00 )             26.4     08-19    137  |  93<L>  |  26<H>  ----------------------------<  142<H>  3.8   |  35<H>  |  1.06    Ca    9.0      19 Aug 2020 07:00

## 2020-08-19 NOTE — PROGRESS NOTE ADULT - ASSESSMENT
60F w COPD on 3LNC (?pending transplant list at [facility]), former smoker, CAD s/p stent (2016), afib on eliquis, uncontrolled DM2, raynaud phenomenon and recent hospitalization at [facility] for altered mental status and [facility] aw COPD exacerbation, LE swelling, weakness.     1. Acute COPD exacerbation- Dyspnea multifactorial in the setting of underlying lung disease, cardiovascular dysfunction, obesity, and deconditioning.     Prednisone tapered off. Overnight and prn Bipap. Pt comfortable off Bipap and able to speak in full sentences.     Pt belligerent and uncooperative during hospitalization, has refused to see many doctors including 2 pulmonary teams- now agreeable to see house pulm again, following.    Continue supplemental O2 with goal O2 sat > 88% - she does not need to be at 100%.    Pt continues to refuse to discuss possible discharge. Abusive towards staff. Refusing to given name of her Pulmonologist at [facility].    2. Acute diastolic CHF- Has been on Diamox with poor response. Restarted on IV Lasix, but developed worsening Cr and metabolic alkalosis, lasix held. Now on Bumex w improvement in LE edema. Continue.     Pulm- patient has a primary respiratory acidosis with a secondary metabolic alkalosis. Her pH and serum bicarb should be monitored closely. Her serum bicarb was previously in the 40s and at this point it is less than that. I do think furosemide or bumex would be a better diuretic as long as her serum bicarb is less than 40.    3. DM2- A1C 7.5. Lantus and premeal insulin.     4. Paroxysmal a fib- Now in NSR. On Apixiban, Cardizem.

## 2020-08-19 NOTE — CHART NOTE - NSCHARTNOTEFT_GEN_A_CORE
Nutrition Follow Up Note  Patient seen for: LOS follow up     Interim events noted, chart reviewed. Pt c acute COPD exacerbation.     Source: pt, PCA    Diet : Diet, Consistent Carbohydrate w/Evening Snack (08-08-20 @ 23:08)    Patient reports: she is eating well at this time. PCA confirms pt has been able to complete her meals. Noted pt c snacks at bedside, states she takes them as needed. Pt declined any questions or concerns at this time. Denies any GI distress.     Daily Weight: 8/11: 200.1->8/19: 204 pounds, would continue to monitor, noted pt previously reported wt of 172 pounds     Pertinent Medications: MEDICATIONS  (STANDING):  albuterol/ipratropium for Nebulization 3 milliLiter(s) Nebulizer every 6 hours  apixaban 5 milliGRAM(s) Oral every 12 hours  aspirin enteric coated 81 milliGRAM(s) Oral daily  atorvastatin 80 milliGRAM(s) Oral at bedtime  azithromycin   Tablet 250 milliGRAM(s) Oral <User Schedule>  Biotene Dry Mouth Oral Rinse 5 milliLiter(s) Swish and Spit two times a day  bisacodyl Suppository 10 milliGRAM(s) Rectal daily  budesonide 160 MICROgram(s)/formoterol 4.5 MICROgram(s) Inhaler 2 Puff(s) Inhalation two times a day  buMETAnide 1 milliGRAM(s) Oral daily  chlorhexidine 2% Cloths 1 Application(s) Topical daily  cholecalciferol 2000 Unit(s) Oral daily  dextrose 5%. 1000 milliLiter(s) (50 mL/Hr) IV Continuous <Continuous>  dextrose 50% Injectable 25 Gram(s) IV Push once  diltiazem    milliGRAM(s) Oral daily  insulin glargine Injectable (LANTUS) 20 Unit(s) SubCutaneous at bedtime  insulin lispro (HumaLOG) corrective regimen sliding scale   SubCutaneous three times a day before meals  insulin lispro (HumaLOG) corrective regimen sliding scale   SubCutaneous at bedtime  insulin lispro Injectable (HumaLOG) 8 Unit(s) SubCutaneous three times a day with meals  lactulose Syrup 15 Gram(s) Oral two times a day  montelukast 10 milliGRAM(s) Oral daily  multivitamin 1 Tablet(s) Oral daily  pantoprazole    Tablet 40 milliGRAM(s) Oral before breakfast  polyethylene glycol 3350 17 Gram(s) Oral daily  senna 2 Tablet(s) Oral at bedtime  theophylline ER (24 Hour) 400 milliGRAM(s) Oral daily  tiotropium 18 MICROgram(s) Capsule 1 Capsule(s) Inhalation daily    MEDICATIONS  (PRN):  ALPRAZolam 0.25 milliGRAM(s) Oral two times a day PRN Anxiety  glucagon  Injectable 1 milliGRAM(s) IntraMuscular once PRN Glucose LESS THAN 70 milligrams/deciliter  guaiFENesin   Syrup  (Sugar-Free) 100 milliGRAM(s) Oral every 6 hours PRN Cough    Pertinent Labs: 08-19 @ 07:00: Na 137, BUN 26<H>, Cr 1.06, <H>, K+ 3.8, Phos --, Mg --, Alk Phos --, ALT/SGPT --, AST/SGOT --, HbA1c --    Finger Sticks:  POCT Blood Glucose.: 235 mg/dL (08-19 @ 09:11)  POCT Blood Glucose.: 142 mg/dL (08-18 @ 21:06)  POCT Blood Glucose.: 109 mg/dL (08-18 @ 18:56)      Skin per nursing documentation: no pressure injuries   Edema: +3 danya. leg, +4 danya. ankle and feet edema     Estimated Needs:   [x] no change since previous assessment      Previous Nutrition Diagnosis: altered nutrition related lab values   Nutrition Diagnosis continues at this time; denies any specific questions at this time     New Nutrition Diagnosis: none at this time       Recommend  1) Continue c current diet.   2) Encouraged continued good intake, emphasis on balanced meals to promote glycemic control.     Monitoring and Evaluation:     Continue to monitor Nutritional intake, Tolerance to diet prescription, weights, labs, skin integrity    RD remains available upon request and will follow up per protocol  Quin Oviedo MS RD CDN [facility],  #[phone]

## 2020-08-19 NOTE — PROGRESS NOTE ADULT - SUBJECTIVE AND OBJECTIVE BOX
CARDIOLOGY FOLLOW UP NOTE - DR. THOMAS    Subjective:    denies chest pain, dyspnea, palpitations, dizziness  ROS: otherwise negative   overnight events:      PHYSICAL EXAM:  T(C): 37.1 (20 @ 05:08), Max: 37.1 (20 @ 21:17)  HR: 90 (20 @ 08:41) (72 - 101)  BP: 137/72 (20 @ 05:08) (137/72 - 138/59)  RR: 20 (20 @ 08:41) (19 - 21)  SpO2: 96% (20 @ 08:41) (95% - 100%)  Wt(kg): --  I&O's Summary    18 Aug 2020 07:  -  19 Aug 2020 07:00  --------------------------------------------------------  IN: 690 mL / OUT: 2200 mL / NET: -1510 mL    19 Aug 2020 07:01  -  19 Aug 2020 16:12  --------------------------------------------------------  IN: 0 mL / OUT: 700 mL / NET: -700 mL      Daily     Daily Weight in k.6 (19 Aug 2020 11:37)    Appearance: Normal	  Cardiovascular: Normal S1 S2,RRR, No JVD, No murmurs  Respiratory: Lungs clear to auscultation	  Gastrointestinal:  Soft, Non-tender, + BS	  Extremities: Normal range of motion, No clubbing, cyanosis or edema      Home Medications:  apixaban 5 mg oral tablet: 1 tab(s) orally every 12 hours (01 Aug 2020 21:52)  aspirin 81 mg oral delayed release tablet: 1 tab(s) orally once a day (01 Aug 2020 21:52)  CoQ10: 200 milligram(s) orally once a day (01 Aug 2020 21:52)  metFORMIN 500 mg oral tablet: 1 tab(s) orally 2 times a day (01 Aug 2020 21:52)  Multiple Vitamins oral tablet: 1 tab(s) orally once a day (01 Aug 2020 21:52)  nitroglycerin 0.4 mg sublingual tablet: 1 tab(s) sublingual every 5 minutes, As Needed (01 Aug 2020 21:52)  Onglyza 5 mg oral tablet: 1 tab(s) orally once a day (01 Aug 2020 21:52)  pantoprazole 40 mg oral delayed release tablet: 1 tab(s) orally once a day (before a meal) (01 Aug 2020 21:)  predniSONE 20 mg oral tablet: 2 tab(s) orally once a day (01 Aug 2020 21:)  rosuvastatin 20 mg oral tablet: 1 tab(s) orally once a day (01 Aug 2020 21:)  Singulair 10 mg oral tablet: 1 tab(s) orally once a day (in the evening) (01 Aug 2020 21:)  Spiriva 18 mcg inhalation capsule: 1 cap(s) inhaled once a day (01 Aug 2020 21:)  Symbicort 160 mcg-4.5 mcg/inh inhalation aerosol: 2 puff(s) inhaled 2 times a day (01 Aug 2020 21:52)  theophylline 400 mg/24 hours oral tablet, extended release: 1 tab(s) orally once a day (01 Aug 2020 21:52)  Ventolin HFA 90 mcg/inh inhalation aerosol: 2 puff(s) inhaled 2 times a day (01 Aug 2020 21:52)  Vitamin D3 2000 intl units oral tablet: 1 tab(s) orally once a day (01 Aug 2020 21:52)      MEDICATIONS  (STANDING):  albuterol/ipratropium for Nebulization 3 milliLiter(s) Nebulizer every 6 hours  apixaban 5 milliGRAM(s) Oral every 12 hours  aspirin enteric coated 81 milliGRAM(s) Oral daily  atorvastatin 80 milliGRAM(s) Oral at bedtime  azithromycin   Tablet 250 milliGRAM(s) Oral <User Schedule>  Biotene Dry Mouth Oral Rinse 5 milliLiter(s) Swish and Spit two times a day  bisacodyl Suppository 10 milliGRAM(s) Rectal daily  budesonide 160 MICROgram(s)/formoterol 4.5 MICROgram(s) Inhaler 2 Puff(s) Inhalation two times a day  buMETAnide 1 milliGRAM(s) Oral daily  chlorhexidine 2% Cloths 1 Application(s) Topical daily  cholecalciferol 2000 Unit(s) Oral daily  dextrose 5%. 1000 milliLiter(s) (50 mL/Hr) IV Continuous <Continuous>  dextrose 50% Injectable 25 Gram(s) IV Push once  diltiazem    milliGRAM(s) Oral daily  insulin glargine Injectable (LANTUS) 20 Unit(s) SubCutaneous at bedtime  insulin lispro (HumaLOG) corrective regimen sliding scale   SubCutaneous three times a day before meals  insulin lispro (HumaLOG) corrective regimen sliding scale   SubCutaneous at bedtime  insulin lispro Injectable (HumaLOG) 8 Unit(s) SubCutaneous three times a day with meals  lactulose Syrup 15 Gram(s) Oral two times a day  montelukast 10 milliGRAM(s) Oral daily  multivitamin 1 Tablet(s) Oral daily  pantoprazole    Tablet 40 milliGRAM(s) Oral before breakfast  polyethylene glycol 3350 17 Gram(s) Oral daily  senna 2 Tablet(s) Oral at bedtime  theophylline ER (24 Hour) 400 milliGRAM(s) Oral daily  tiotropium 18 MICROgram(s) Capsule 1 Capsule(s) Inhalation daily      TELEMETRY: 	    ECG:  	  RADIOLOGY:   DIAGNOSTIC TESTING:  [ ] Echocardiogram:  [ ] Catheterization:  [ ] Stress Test:    OTHER: 	    LABS:	 	    CARDIAC MARKERS:                                7.9    8.96  )-----------( 232      ( 19 Aug 2020 07:00 )             26.4     -    137  |  93<L>  |  26<H>  ----------------------------<  142<H>  3.8   |  35<H>  |  1.06    Ca    9.0      19 Aug 2020 07:00      proBNP:     Lipid Profile:   HgA1c:     Creatinine, Serum: 1.06 mg/dL (20 @ 07:00)  Creatinine, Serum: 1.07 mg/dL (20 @ 10:02)  Creatinine, Serum: 1.03 mg/dL (20 @ 10:04)

## 2020-08-19 NOTE — PROGRESS NOTE ADULT - ASSESSMENT
EVAN 1/7/19: no ALINA thrombus, unable to assess LV sys fx  echo 12/7/18: EF 71%, nl LV sys fx, mild diastolic dysfx     a/p    60 year old female with hx of D CHF, HTN, CAD s/p PCI, Afib/ aflutter, s/p Aflutter Ablation 12/2019, COPD, DM2, Anxiety, , raynaud phenomenon presenting with weakness, SOB , hyperkalemia.      1. Dyspnea, Resp failure  -secondary to chronic lung disease, COPD, volume overload  -pulm f/u   -covid 19 negative  -no acs , cv stable   -ecg with known RBBB, new twi noted, hyperkalemia also noted on admission  -echo with normal LVEF, mild diastolic dysfunction   -s/p PO Prednisone  -on Duoneb, Symbicort, Spiriva and Theophylline.    2. CAD s/p PCI   -stable, no cp  -c/w ASA, statin  -echo noted above     3. Afib/aflutter s/p  Aflutter Ablation 12/2019  -in NSR, cw cardizem  mg daily  -CHADsVac=2, c/w eliquis     4. Acute on Chronic Diastolic CHF   -dyspnea mostly secondary to copd  -intermittently on bipap   -echo with normal lVEF   -Has been on Diamox with poor response  -Restarted on IV Lasix, but held for worsening Cr and metabolic alkalosis   -creat stable, LE edema improving , net ouput neg  -continue bumex 1mg daily  -cont diamox per pulmo  -will closely monitor bicarb      dvt ppx

## 2020-08-20 LAB
GLUCOSE BLDC GLUCOMTR-MCNC: 114 MG/DL — HIGH (ref 70–99)
GLUCOSE BLDC GLUCOMTR-MCNC: 124 MG/DL — HIGH (ref 70–99)
GLUCOSE BLDC GLUCOMTR-MCNC: 153 MG/DL — HIGH (ref 70–99)
GLUCOSE BLDC GLUCOMTR-MCNC: 158 MG/DL — HIGH (ref 70–99)
GLUCOSE BLDC GLUCOMTR-MCNC: 41 MG/DL — CRITICAL LOW (ref 70–99)
GLUCOSE BLDC GLUCOMTR-MCNC: 50 MG/DL — LOW (ref 70–99)
GLUCOSE BLDC GLUCOMTR-MCNC: 58 MG/DL — LOW (ref 70–99)
GLUCOSE BLDC GLUCOMTR-MCNC: 70 MG/DL — SIGNIFICANT CHANGE UP (ref 70–99)
SARS-COV-2 RNA SPEC QL NAA+PROBE: SIGNIFICANT CHANGE UP

## 2020-08-20 PROCEDURE — 99233 SBSQ HOSP IP/OBS HIGH 50: CPT

## 2020-08-20 RX ORDER — INSULIN LISPRO 100/ML
5 VIAL (ML) SUBCUTANEOUS
Refills: 0 | Status: DISCONTINUED | OUTPATIENT
Start: 2020-08-20 | End: 2020-08-28

## 2020-08-20 RX ORDER — ACETAMINOPHEN 500 MG
1000 TABLET ORAL ONCE
Refills: 0 | Status: COMPLETED | OUTPATIENT
Start: 2020-08-20 | End: 2020-08-20

## 2020-08-20 RX ORDER — INSULIN GLARGINE 100 [IU]/ML
16 INJECTION, SOLUTION SUBCUTANEOUS ONCE
Refills: 0 | Status: COMPLETED | OUTPATIENT
Start: 2020-08-20 | End: 2020-08-20

## 2020-08-20 RX ORDER — TRAMADOL HYDROCHLORIDE 50 MG/1
25 TABLET ORAL ONCE
Refills: 0 | Status: DISCONTINUED | OUTPATIENT
Start: 2020-08-20 | End: 2020-08-20

## 2020-08-20 RX ADMIN — Medication 1000 MILLIGRAM(S): at 14:39

## 2020-08-20 RX ADMIN — Medication 3 MILLILITER(S): at 06:05

## 2020-08-20 RX ADMIN — ATORVASTATIN CALCIUM 80 MILLIGRAM(S): 80 TABLET, FILM COATED ORAL at 22:32

## 2020-08-20 RX ADMIN — MONTELUKAST 10 MILLIGRAM(S): 4 TABLET, CHEWABLE ORAL at 13:01

## 2020-08-20 RX ADMIN — Medication 5 UNIT(S): at 19:04

## 2020-08-20 RX ADMIN — BUDESONIDE AND FORMOTEROL FUMARATE DIHYDRATE 2 PUFF(S): 160; 4.5 AEROSOL RESPIRATORY (INHALATION) at 06:05

## 2020-08-20 RX ADMIN — Medication 3 MILLILITER(S): at 18:25

## 2020-08-20 RX ADMIN — Medication 1: at 19:04

## 2020-08-20 RX ADMIN — SENNA PLUS 2 TABLET(S): 8.6 TABLET ORAL at 22:32

## 2020-08-20 RX ADMIN — Medication 1 TABLET(S): at 13:01

## 2020-08-20 RX ADMIN — Medication 3 MILLILITER(S): at 00:10

## 2020-08-20 RX ADMIN — Medication 5 MILLILITER(S): at 18:21

## 2020-08-20 RX ADMIN — APIXABAN 5 MILLIGRAM(S): 2.5 TABLET, FILM COATED ORAL at 18:20

## 2020-08-20 RX ADMIN — Medication 3 MILLILITER(S): at 22:52

## 2020-08-20 RX ADMIN — Medication 400 MILLIGRAM(S): at 13:50

## 2020-08-20 RX ADMIN — APIXABAN 5 MILLIGRAM(S): 2.5 TABLET, FILM COATED ORAL at 06:04

## 2020-08-20 RX ADMIN — Medication 2000 UNIT(S): at 13:01

## 2020-08-20 RX ADMIN — Medication 3 MILLILITER(S): at 13:10

## 2020-08-20 RX ADMIN — Medication 400 MILLIGRAM(S): at 14:09

## 2020-08-20 RX ADMIN — Medication 81 MILLIGRAM(S): at 13:01

## 2020-08-20 RX ADMIN — TRAMADOL HYDROCHLORIDE 25 MILLIGRAM(S): 50 TABLET ORAL at 18:25

## 2020-08-20 RX ADMIN — PANTOPRAZOLE SODIUM 40 MILLIGRAM(S): 20 TABLET, DELAYED RELEASE ORAL at 09:28

## 2020-08-20 RX ADMIN — Medication 180 MILLIGRAM(S): at 06:04

## 2020-08-20 RX ADMIN — CHLORHEXIDINE GLUCONATE 1 APPLICATION(S): 213 SOLUTION TOPICAL at 13:01

## 2020-08-20 RX ADMIN — INSULIN GLARGINE 16 UNIT(S): 100 INJECTION, SOLUTION SUBCUTANEOUS at 22:41

## 2020-08-20 RX ADMIN — TIOTROPIUM BROMIDE 1 CAPSULE(S): 18 CAPSULE ORAL; RESPIRATORY (INHALATION) at 13:10

## 2020-08-20 RX ADMIN — Medication 400 MILLIGRAM(S): at 22:50

## 2020-08-20 RX ADMIN — BUDESONIDE AND FORMOTEROL FUMARATE DIHYDRATE 2 PUFF(S): 160; 4.5 AEROSOL RESPIRATORY (INHALATION) at 18:25

## 2020-08-20 RX ADMIN — Medication 1000 MILLIGRAM(S): at 23:47

## 2020-08-20 RX ADMIN — BUMETANIDE 1 MILLIGRAM(S): 0.25 INJECTION INTRAMUSCULAR; INTRAVENOUS at 06:04

## 2020-08-20 RX ADMIN — Medication 5 MILLILITER(S): at 06:07

## 2020-08-20 RX ADMIN — Medication 8 UNIT(S): at 09:23

## 2020-08-20 NOTE — PROGRESS NOTE ADULT - ASSESSMENT
Anemia 60F w COPD on 3LNC (?pending transplant list at [facility]), former smoker, CAD s/p stent (2016), afib on eliquis, uncontrolled DM2, raynaud phenomenon and recent hospitalization at [facility] for altered mental status and [facility] aw COPD exacerbation, LE swelling, weakness.     1. Acute COPD exacerbation- Dyspnea multifactorial in the setting of underlying lung disease, cardiovascular dysfunction, obesity, and deconditioning.     Prednisone tapered off. Overnight and prn Bipap. Pt comfortable off Bipap and able to speak in full sentences.     Pt belligerent and uncooperative during hospitalization, has refused to see many doctors including 2 pulmonary teams- now agreeable to see house pulm again, following.    Continue supplemental O2 with goal O2 sat > 88% - she does not need to be at 100%.    Pt continues to refuse to discuss possible discharge. Abusive towards staff. Refusing to given name of her Pulmonologist at [facility].    Pt seen today w nurse, nurse manager and NP.    Pt is medically stable for discharge w follow up- pulmonary team agreeable.     2. Acute diastolic CHF- Has been on Diamox with poor response. Restarted on IV Lasix, but developed worsening Cr and metabolic alkalosis, lasix held. Now on Bumex w improvement in LE edema. Continue.     Pulm- patient has a primary respiratory acidosis with a secondary metabolic alkalosis. Her pH and serum bicarb should be monitored closely. Her serum bicarb was previously in the 40s and at this point it is less than that. I do think furosemide or bumex would be a better diuretic as long as her serum bicarb is less than 40.    3. DM2- A1C 7.5. Lantus and premeal insulin. Hypoglycemic this afternoon- premeal decreased. Please hold premeal if patient skips meals.     4. Paroxysmal a fib- Now in NSR. On Apixiban, Cardizem.

## 2020-08-20 NOTE — PROGRESS NOTE ADULT - ATTENDING COMMENTS
Agree with above NP note.  cv stable  continued leg pain, edema  continue bumex as ordered  will consider inc to bid if bmp stable or try iv bran

## 2020-08-20 NOTE — PROGRESS NOTE ADULT - ASSESSMENT
59 y/o F with PMH of COPD on home oxygen 4L NC, AF on Eliquis, CAD s/p PCI p/w acute on chronic respiratory failure. Cont to be grossly volume overloaded but feels her dyspnea has improved since admission       - cont diuresis w/ bumex  -monitor I/O  - d/w primary team to d/c diamox  - monitor serum bicarb  - cont nocturnal bipap which provides her significant relief but also requiring intermittently in daytime- goal to decrease use in daytime   - cont NC daytime goal sat 90-95%  - complete prednisone  - cont spiriva and symbicort  - cont theophylline  - cont nebulizer tx  - on azithro MWF  - DVT ppx - on a/c for AF  -ultimately will need to follow w/ [facility] for lung transplant evaluation  - cont physical therapy  - CT from 2019 shows nodule vs more likely atelectasis in RLL (nonurgent CT chest to reevaluate, can be done as outpt when pt more stable and can be done at [facility] as part of their Transplant eval) 59 y/o F with PMH of COPD on home oxygen 4L NC, AF on Eliquis, CAD s/p PCI p/w acute on chronic respiratory failure. Cont to be grossly volume overloaded but feels her dyspnea has improved since admission.     - her COPD is severe but seems to be back to her limited baseline status and she does not require further inpt stay for that reason  - cont diuresis w/ bumex and hold diamox  - cont nocturnal bipap which provides her significant relief but also requiring intermittently in daytime- goal to decrease use in daytime   - cont NC daytime goal sat 90-95%  -s/p prednisone course, avoid further steroids if possible due to risk of worsening volume status  - cont spiriva and symbicort  - cont theophylline  - cont nebulizer tx  - on azithro MWF  - DVT ppx - on a/c for AF  -ultimately will need to follow w/ [facility] for lung transplant evaluation  - cont physical therapy  - CT from 2019 shows nodule vs more likely atelectasis in RLL (nonurgent CT chest to reevaluate, can be done as outpt if desired and can be done/followed at [facility] as part of their Transplant eval)

## 2020-08-20 NOTE — PROGRESS NOTE ADULT - SUBJECTIVE AND OBJECTIVE BOX
Patient is a 60y old  Female who presents with a chief complaint of 60F p/w generalized weakness and difficulty walking (20 Aug 2020 13:14)    HPI: Pt seen w NP, nurse and nurse manager. Continued to be abusive towards staff and keeps attempting to deflect conversation.     Vital Signs Last 24 Hrs  T(C): 36.6 (20 Aug 2020 11:11), Max: 36.6 (19 Aug 2020 23:55)  T(F): 97.9 (20 Aug 2020 11:11), Max: 97.9 (20 Aug 2020 11:11)  HR: 94 (20 Aug 2020 14:02) (84 - 99)  BP: 149/78 (20 Aug 2020 11:11) (128/76 - 154/72)  BP(mean): --  RR: 18 (20 Aug 2020 11:11) (18 - 20)  SpO2: 95% (20 Aug 2020 14:02) (95% - 100%)                          7.9    8.96  )-----------( 232      ( 19 Aug 2020 07:00 )             26.4     08-19    137  |  93<L>  |  26<H>  ----------------------------<  142<H>  3.8   |  35<H>  |  1.06    Ca    9.0      19 Aug 2020 07:00      MEDICATIONS  (STANDING):  albuterol/ipratropium for Nebulization 3 milliLiter(s) Nebulizer every 6 hours  apixaban 5 milliGRAM(s) Oral every 12 hours  aspirin enteric coated 81 milliGRAM(s) Oral daily  atorvastatin 80 milliGRAM(s) Oral at bedtime  azithromycin   Tablet 250 milliGRAM(s) Oral <User Schedule>  Biotene Dry Mouth Oral Rinse 5 milliLiter(s) Swish and Spit two times a day  bisacodyl Suppository 10 milliGRAM(s) Rectal daily  budesonide 160 MICROgram(s)/formoterol 4.5 MICROgram(s) Inhaler 2 Puff(s) Inhalation two times a day  buMETAnide 1 milliGRAM(s) Oral daily  chlorhexidine 2% Cloths 1 Application(s) Topical daily  cholecalciferol 2000 Unit(s) Oral daily  dextrose 5%. 1000 milliLiter(s) (50 mL/Hr) IV Continuous <Continuous>  dextrose 50% Injectable 25 Gram(s) IV Push once  diltiazem    milliGRAM(s) Oral daily  insulin glargine Injectable (LANTUS) 20 Unit(s) SubCutaneous at bedtime  insulin lispro (HumaLOG) corrective regimen sliding scale   SubCutaneous three times a day before meals  insulin lispro (HumaLOG) corrective regimen sliding scale   SubCutaneous at bedtime  insulin lispro Injectable (HumaLOG) 8 Unit(s) SubCutaneous three times a day with meals  lactulose Syrup 15 Gram(s) Oral two times a day  montelukast 10 milliGRAM(s) Oral daily  multivitamin 1 Tablet(s) Oral daily  pantoprazole    Tablet 40 milliGRAM(s) Oral before breakfast  polyethylene glycol 3350 17 Gram(s) Oral daily  senna 2 Tablet(s) Oral at bedtime  theophylline ER (24 Hour) 400 milliGRAM(s) Oral daily  tiotropium 18 MICROgram(s) Capsule 1 Capsule(s) Inhalation daily    MEDICATIONS  (PRN):  ALPRAZolam 0.25 milliGRAM(s) Oral two times a day PRN Anxiety  glucagon  Injectable 1 milliGRAM(s) IntraMuscular once PRN Glucose LESS THAN 70 milligrams/deciliter  guaiFENesin   Syrup  (Sugar-Free) 100 milliGRAM(s) Oral every 6 hours PRN Cough

## 2020-08-20 NOTE — PROGRESS NOTE ADULT - ASSESSMENT
EVAN 1/7/19: no ALINA thrombus, unable to assess LV sys fx  echo 12/7/18: EF 71%, nl LV sys fx, mild diastolic dysfx     a/p    60 year old female with hx of D CHF, HTN, CAD s/p PCI, Afib/ aflutter, s/p Aflutter Ablation 12/2019, COPD, DM2, Anxiety, , raynaud phenomenon presenting with weakness, SOB , hyperkalemia.      1. Dyspnea, Resp failure  -secondary to chronic lung disease, COPD, volume overload  -pulm f/u   -covid 19 negative  -no acs , cv stable   -ecg with known RBBB, new twi noted, hyperkalemia also noted on admission  -echo with normal LVEF, mild diastolic dysfunction   -s/p PO Prednisone  -on Duoneb, Symbicort, Spiriva and Theophylline.    2. CAD s/p PCI   -stable, no cp  -c/w ASA, statin  -echo noted above     3. Afib/aflutter s/p  Aflutter Ablation 12/2019  -in NSR, cw cardizem  mg daily  -CHADsVac=2, c/w eliquis     4. Acute on Chronic Diastolic CHF   -dyspnea mostly secondary to copd  -intermittently on bipap   -echo with normal lVEF   -Has been on Diamox with poor response  -Restarted on IV Lasix, but held for worsening Cr and metabolic alkalosis   -creat stable, LE edema increasing   -continue bumex 1mg daily  - consider IV diuretics today   -will closely monitor bicarb      dvt ppx

## 2020-08-20 NOTE — PROGRESS NOTE ADULT - SUBJECTIVE AND OBJECTIVE BOX
CHIEF COMPLAINT: Patient is a 60y old  Female who presents with a chief complaint of 60F p/w generalized weakness and difficulty walking (14 Aug 2020 15:22)          OVERNIGHT EVENTS    currently on 4L O2    used BIPAP 15/5 overnight and using intermittently in daytime  Denies wheezing  Denies cough/sputum  Denies fever     ROS:  CONSTITUTIONAL: edematous  CARDIOVASCULAR: Denies chest pain or palpitations  PULMONARY: Dyspnea, no hemoptysis  [x] REMAINING REVIEW OF SYSTEMS NEGATIVE         MEDICATIONS  (STANDING):  acetaZOLAMIDE Injectable 250 milliGRAM(s) IV Push <User Schedule>  albuterol/ipratropium for Nebulization 3 milliLiter(s) Nebulizer every 6 hours  apixaban 5 milliGRAM(s) Oral every 12 hours  aspirin enteric coated 81 milliGRAM(s) Oral daily  atorvastatin 80 milliGRAM(s) Oral at bedtime  azithromycin   Tablet 250 milliGRAM(s) Oral <User Schedule>  Biotene Dry Mouth Oral Rinse 5 milliLiter(s) Swish and Spit two times a day  bisacodyl Suppository 10 milliGRAM(s) Rectal daily  budesonide 160 MICROgram(s)/formoterol 4.5 MICROgram(s) Inhaler 2 Puff(s) Inhalation two times a day  buMETAnide 1 milliGRAM(s) Oral daily  chlorhexidine 2% Cloths 1 Application(s) Topical daily  cholecalciferol 2000 Unit(s) Oral daily  dextrose 5%. 1000 milliLiter(s) (50 mL/Hr) IV Continuous <Continuous>  dextrose 50% Injectable 25 Gram(s) IV Push once  diltiazem    milliGRAM(s) Oral daily  insulin glargine Injectable (LANTUS) 20 Unit(s) SubCutaneous at bedtime  insulin lispro (HumaLOG) corrective regimen sliding scale   SubCutaneous three times a day before meals  insulin lispro (HumaLOG) corrective regimen sliding scale   SubCutaneous at bedtime  insulin lispro Injectable (HumaLOG) 10 Unit(s) SubCutaneous three times a day with meals  lactulose Syrup 15 Gram(s) Oral two times a day  montelukast 10 milliGRAM(s) Oral daily  multivitamin 1 Tablet(s) Oral daily  pantoprazole    Tablet 40 milliGRAM(s) Oral before breakfast  polyethylene glycol 3350 17 Gram(s) Oral daily  senna 2 Tablet(s) Oral at bedtime  theophylline ER (24 Hour) 400 milliGRAM(s) Oral daily  tiotropium 18 MICROgram(s) Capsule 1 Capsule(s) Inhalation daily       ICU Vital Signs Last 24 Hrs  T(C): 36.5 (18 Aug 2020 08:45), Max: 36.8 (17 Aug 2020 18:30)  T(F): 97.7 (18 Aug 2020 08:45), Max: 98.3 (17 Aug 2020 18:30)  HR: 71 (18 Aug 2020 09:52) (69 - 79)  BP: 110/56 (18 Aug 2020 08:45) (110/56 - 127/50)  BP(mean): --  ABP: --  ABP(mean): --  RR: 20 (18 Aug 2020 09:51) (18 - 20)  SpO2: 98% (18 Aug 2020 09:52) (96% - 99%)        GENERAL:  AAOx3,  able to speak clearly for prolonged duration w/o dyspnea  EYES: anicteric, EOMI  EAR/NOSE/MOUTH/THROAT: NCAT, MMM, nares clear, trachea midline, no thrush  CARDIOVASCULAR: RRR, S1S2, systolic murmur  RESPIRATORY: prolonged expiratory phase, no wheezing  ABDOMEN: soft, obese, NT, ND, +BS  EXTREMITIES:  bilateral LE edema, pitting up to thighs  SKIN: LE wrapped bilaterally, large blister on left foot  MUSCULOSKELETAL: strength diminished  NEUROLOGIC: nonfocal exam, pain in back side                                       8.1    9.08  )-----------( 212      ( 18 Aug 2020 10:02 )             27.5   08-18    132<L>  |  91<L>  |  26<H>  ----------------------------<  155<H>  3.6   |  36<H>  |  1.07    Ca    8.9      18 Aug 2020 10:02            ECHO: < from: Transthoracic Echocardiogram (08.05.20 @ 02:57) >  Mitral Valve: Normal mitral valve. Minimal mitral  regurgitation.  Aortic Valve/Aorta: Aortic valve not well visualized. Peak  transaortic valve gradient equals 13 mm Hg, mean  transaortic valve gradient equals 6 mm Hg, aortic valve  velocity time integral equals 30 cm. Peak left ventricular  outflow tract gradient equals 5 mm Hg, mean gradient is  equal to 2 mm Hg, LVOT velocity time integral equals 15 cm.  Aortic Root: 3.4 cm.  Left Atrium: Normal left atrium.  Left Ventricle: Normal left ventricular systolic function.  No segmental wall motion abnormalities. Normal left  ventricular internal dimensions and wall thicknesses. Mild  diastolic dysfunction (Stage I).  Right Heart: Normal right atrium. Normal right ventricular  size and systolic function. Normal tricuspid valve.  Pericardium/Pleura: Normal pericardium with no pericardial  effusion.  Hemodynamic: Estimated right atrial pressure is 8 mm Hg.  ------------------------------------------------------------------------  Conclusions:  1. Normal left ventricular internal dimensions and wall  thicknesses.  2. Normal left ventricular systolic function. No segmental  wall motion abnormalities.  3. Mild diastolic dysfunction (Stage I).  4. Normal right ventricular size and systolic function.    < end of copied text > CHIEF COMPLAINT: Patient is a 60y old  Female who presents with a chief complaint of 60F p/w generalized weakness and difficulty walking (14 Aug 2020 15:22)          OVERNIGHT EVENTS    currently on 4L O2    used BIPAP 15/5 overnight and using intermittently in daytime  Denies wheezing  Denies cough/sputum  Denies fever     ROS:  CONSTITUTIONAL: edematous  CARDIOVASCULAR: Denies chest pain or palpitations  PULMONARY: Dyspnea, no hemoptysis  [x] REMAINING REVIEW OF SYSTEMS NEGATIVE        MEDICATIONS  (STANDING):  albuterol/ipratropium for Nebulization 3 milliLiter(s) Nebulizer every 6 hours  apixaban 5 milliGRAM(s) Oral every 12 hours  aspirin enteric coated 81 milliGRAM(s) Oral daily  atorvastatin 80 milliGRAM(s) Oral at bedtime  azithromycin   Tablet 250 milliGRAM(s) Oral <User Schedule>  Biotene Dry Mouth Oral Rinse 5 milliLiter(s) Swish and Spit two times a day  bisacodyl Suppository 10 milliGRAM(s) Rectal daily  budesonide 160 MICROgram(s)/formoterol 4.5 MICROgram(s) Inhaler 2 Puff(s) Inhalation two times a day  buMETAnide 1 milliGRAM(s) Oral daily  chlorhexidine 2% Cloths 1 Application(s) Topical daily  cholecalciferol 2000 Unit(s) Oral daily  dextrose 5%. 1000 milliLiter(s) (50 mL/Hr) IV Continuous <Continuous>  dextrose 50% Injectable 25 Gram(s) IV Push once  diltiazem    milliGRAM(s) Oral daily  insulin glargine Injectable (LANTUS) 20 Unit(s) SubCutaneous at bedtime  insulin lispro (HumaLOG) corrective regimen sliding scale   SubCutaneous three times a day before meals  insulin lispro (HumaLOG) corrective regimen sliding scale   SubCutaneous at bedtime  insulin lispro Injectable (HumaLOG) 8 Unit(s) SubCutaneous three times a day with meals  lactulose Syrup 15 Gram(s) Oral two times a day  montelukast 10 milliGRAM(s) Oral daily  multivitamin 1 Tablet(s) Oral daily  pantoprazole    Tablet 40 milliGRAM(s) Oral before breakfast  polyethylene glycol 3350 17 Gram(s) Oral daily  senna 2 Tablet(s) Oral at bedtime  theophylline ER (24 Hour) 400 milliGRAM(s) Oral daily  tiotropium 18 MICROgram(s) Capsule 1 Capsule(s) Inhalation daily        Vital Signs Last 24 Hrs  T(C): 36.6 (20 Aug 2020 11:11), Max: 36.6 (19 Aug 2020 23:55)  T(F): 97.9 (20 Aug 2020 11:11), Max: 97.9 (20 Aug 2020 11:11)  HR: 88 (20 Aug 2020 11:11) (84 - 99)  BP: 149/78 (20 Aug 2020 11:11) (128/76 - 154/72)  BP(mean): --  RR: 18 (20 Aug 2020 11:11) (18 - 20)  SpO2: 100% (20 Aug 2020 11:11) (95% - 100%)        GENERAL:  AAOx3,  able to speak clearly for prolonged duration w/o dyspnea  EYES: anicteric, EOMI  EAR/NOSE/MOUTH/THROAT: NCAT, MMM, nares clear, trachea midline, no thrush  CARDIOVASCULAR: RRR, S1S2, systolic murmur  RESPIRATORY: prolonged expiratory phase, no wheezing  ABDOMEN: soft, obese, NT, ND, +BS  EXTREMITIES:  bilateral LE edema, pitting up to thighs  SKIN: LE wrapped bilaterally, large blister on left foot now wrapped  MUSCULOSKELETAL: strength diminished  NEUROLOGIC: nonfocal exam, pain in back side                                     7.9    8.96  )-----------( 232      ( 19 Aug 2020 07:00 )             26.4   08-19    137  |  93<L>  |  26<H>  ----------------------------<  142<H>  3.8   |  35<H>  |  1.06    Ca    9.0      19 Aug 2020 07:00              ECHO: < from: Transthoracic Echocardiogram (08.05.20 @ 02:57) >  Mitral Valve: Normal mitral valve. Minimal mitral  regurgitation.  Aortic Valve/Aorta: Aortic valve not well visualized. Peak  transaortic valve gradient equals 13 mm Hg, mean  transaortic valve gradient equals 6 mm Hg, aortic valve  velocity time integral equals 30 cm. Peak left ventricular  outflow tract gradient equals 5 mm Hg, mean gradient is  equal to 2 mm Hg, LVOT velocity time integral equals 15 cm.  Aortic Root: 3.4 cm.  Left Atrium: Normal left atrium.  Left Ventricle: Normal left ventricular systolic function.  No segmental wall motion abnormalities. Normal left  ventricular internal dimensions and wall thicknesses. Mild  diastolic dysfunction (Stage I).  Right Heart: Normal right atrium. Normal right ventricular  size and systolic function. Normal tricuspid valve.  Pericardium/Pleura: Normal pericardium with no pericardial  effusion.  Hemodynamic: Estimated right atrial pressure is 8 mm Hg.  ------------------------------------------------------------------------  Conclusions:  1. Normal left ventricular internal dimensions and wall  thicknesses.  2. Normal left ventricular systolic function. No segmental  wall motion abnormalities.  3. Mild diastolic dysfunction (Stage I).  4. Normal right ventricular size and systolic function.    < end of copied text >

## 2020-08-20 NOTE — PROGRESS NOTE ADULT - SUBJECTIVE AND OBJECTIVE BOX
CARDIOLOGY FOLLOW UP - Dr. Brunson    CC no cp   c/o SOB, refusing to put bipap on because the room is too warm   pulse ox stable on nasal canula  c/o b/l LE pain and swelling       PHYSICAL EXAM:  T(C): 36.6 (08-20-20 @ 11:11), Max: 36.6 (08-19-20 @ 23:55)  HR: 88 (08-20-20 @ 11:11) (84 - 99)  BP: 149/78 (08-20-20 @ 11:11) (128/76 - 154/72)  RR: 18 (08-20-20 @ 11:11) (18 - 20)  SpO2: 100% (08-20-20 @ 11:11) (95% - 100%)  Wt(kg): --  I&O's Summary    19 Aug 2020 07:01  -  20 Aug 2020 07:00  --------------------------------------------------------  IN: 240 mL / OUT: 1000 mL / NET: -760 mL    20 Aug 2020 07:01  -  20 Aug 2020 11:18  --------------------------------------------------------  IN: 350 mL / OUT: 850 mL / NET: -500 mL        Appearance: Normal	  Cardiovascular: Normal S1 S2,RRR, No JVD, No murmurs  Respiratory: diminished   Gastrointestinal:  Soft, Non-tender, + BS	  Extremities: b/l ++ LE edema, LL blister, dsg in place         MEDICATIONS  (STANDING):  albuterol/ipratropium for Nebulization 3 milliLiter(s) Nebulizer every 6 hours  apixaban 5 milliGRAM(s) Oral every 12 hours  aspirin enteric coated 81 milliGRAM(s) Oral daily  atorvastatin 80 milliGRAM(s) Oral at bedtime  azithromycin   Tablet 250 milliGRAM(s) Oral <User Schedule>  Biotene Dry Mouth Oral Rinse 5 milliLiter(s) Swish and Spit two times a day  bisacodyl Suppository 10 milliGRAM(s) Rectal daily  budesonide 160 MICROgram(s)/formoterol 4.5 MICROgram(s) Inhaler 2 Puff(s) Inhalation two times a day  buMETAnide 1 milliGRAM(s) Oral daily  chlorhexidine 2% Cloths 1 Application(s) Topical daily  cholecalciferol 2000 Unit(s) Oral daily  dextrose 5%. 1000 milliLiter(s) (50 mL/Hr) IV Continuous <Continuous>  dextrose 50% Injectable 25 Gram(s) IV Push once  diltiazem    milliGRAM(s) Oral daily  insulin glargine Injectable (LANTUS) 20 Unit(s) SubCutaneous at bedtime  insulin lispro (HumaLOG) corrective regimen sliding scale   SubCutaneous three times a day before meals  insulin lispro (HumaLOG) corrective regimen sliding scale   SubCutaneous at bedtime  insulin lispro Injectable (HumaLOG) 8 Unit(s) SubCutaneous three times a day with meals  lactulose Syrup 15 Gram(s) Oral two times a day  montelukast 10 milliGRAM(s) Oral daily  multivitamin 1 Tablet(s) Oral daily  pantoprazole    Tablet 40 milliGRAM(s) Oral before breakfast  polyethylene glycol 3350 17 Gram(s) Oral daily  senna 2 Tablet(s) Oral at bedtime  theophylline ER (24 Hour) 400 milliGRAM(s) Oral daily  tiotropium 18 MICROgram(s) Capsule 1 Capsule(s) Inhalation daily      TELEMETRY: 	    ECG:  	  RADIOLOGY:   DIAGNOSTIC TESTING:  [ ] Echocardiogram:  [ ]  Catheterization:  [ ] Stress Test:    OTHER: 	    LABS:	 	                                7.9    8.96  )-----------( 232      ( 19 Aug 2020 07:00 )             26.4     08-19    137  |  93<L>  |  26<H>  ----------------------------<  142<H>  3.8   |  35<H>  |  1.06    Ca    9.0      19 Aug 2020 07:00

## 2020-08-21 DIAGNOSIS — I50.31 ACUTE DIASTOLIC (CONGESTIVE) HEART FAILURE: ICD-10-CM

## 2020-08-21 DIAGNOSIS — J44.1 CHRONIC OBSTRUCTIVE PULMONARY DISEASE WITH (ACUTE) EXACERBATION: ICD-10-CM

## 2020-08-21 DIAGNOSIS — I48.0 PAROXYSMAL ATRIAL FIBRILLATION: ICD-10-CM

## 2020-08-21 LAB
ANION GAP SERPL CALC-SCNC: 4 MMOL/L — LOW (ref 5–17)
BUN SERPL-MCNC: 20 MG/DL — SIGNIFICANT CHANGE UP (ref 7–23)
CALCIUM SERPL-MCNC: 9.3 MG/DL — SIGNIFICANT CHANGE UP (ref 8.4–10.5)
CHLORIDE SERPL-SCNC: 92 MMOL/L — LOW (ref 96–108)
CO2 SERPL-SCNC: 40 MMOL/L — HIGH (ref 22–31)
CREAT SERPL-MCNC: 0.82 MG/DL — SIGNIFICANT CHANGE UP (ref 0.5–1.3)
GLUCOSE BLDC GLUCOMTR-MCNC: 101 MG/DL — HIGH (ref 70–99)
GLUCOSE BLDC GLUCOMTR-MCNC: 145 MG/DL — HIGH (ref 70–99)
GLUCOSE BLDC GLUCOMTR-MCNC: 82 MG/DL — SIGNIFICANT CHANGE UP (ref 70–99)
GLUCOSE BLDC GLUCOMTR-MCNC: 92 MG/DL — SIGNIFICANT CHANGE UP (ref 70–99)
GLUCOSE SERPL-MCNC: 131 MG/DL — HIGH (ref 70–99)
HCT VFR BLD CALC: 26.8 % — LOW (ref 34.5–45)
HGB BLD-MCNC: 7.8 G/DL — LOW (ref 11.5–15.5)
MCHC RBC-ENTMCNC: 24.8 PG — LOW (ref 27–34)
MCHC RBC-ENTMCNC: 29.1 GM/DL — LOW (ref 32–36)
MCV RBC AUTO: 85.1 FL — SIGNIFICANT CHANGE UP (ref 80–100)
NRBC # BLD: 0 /100 WBCS — SIGNIFICANT CHANGE UP (ref 0–0)
PLATELET # BLD AUTO: 187 K/UL — SIGNIFICANT CHANGE UP (ref 150–400)
POTASSIUM SERPL-MCNC: 3.6 MMOL/L — SIGNIFICANT CHANGE UP (ref 3.5–5.3)
POTASSIUM SERPL-SCNC: 3.6 MMOL/L — SIGNIFICANT CHANGE UP (ref 3.5–5.3)
RBC # BLD: 3.15 M/UL — LOW (ref 3.8–5.2)
RBC # FLD: 14.8 % — HIGH (ref 10.3–14.5)
SODIUM SERPL-SCNC: 136 MMOL/L — SIGNIFICANT CHANGE UP (ref 135–145)
WBC # BLD: 6.96 K/UL — SIGNIFICANT CHANGE UP (ref 3.8–10.5)
WBC # FLD AUTO: 6.96 K/UL — SIGNIFICANT CHANGE UP (ref 3.8–10.5)

## 2020-08-21 PROCEDURE — 99232 SBSQ HOSP IP/OBS MODERATE 35: CPT

## 2020-08-21 PROCEDURE — 99233 SBSQ HOSP IP/OBS HIGH 50: CPT

## 2020-08-21 RX ORDER — ACETAMINOPHEN 500 MG
1000 TABLET ORAL ONCE
Refills: 0 | Status: COMPLETED | OUTPATIENT
Start: 2020-08-21 | End: 2020-08-21

## 2020-08-21 RX ORDER — ACETAZOLAMIDE 250 MG/1
250 TABLET ORAL ONCE
Refills: 0 | Status: COMPLETED | OUTPATIENT
Start: 2020-08-21 | End: 2020-08-21

## 2020-08-21 RX ADMIN — Medication 81 MILLIGRAM(S): at 13:09

## 2020-08-21 RX ADMIN — Medication 3 MILLILITER(S): at 05:51

## 2020-08-21 RX ADMIN — MONTELUKAST 10 MILLIGRAM(S): 4 TABLET, CHEWABLE ORAL at 13:13

## 2020-08-21 RX ADMIN — Medication 400 MILLIGRAM(S): at 13:12

## 2020-08-21 RX ADMIN — Medication 0: at 22:10

## 2020-08-21 RX ADMIN — Medication 5 UNIT(S): at 13:14

## 2020-08-21 RX ADMIN — INSULIN GLARGINE 20 UNIT(S): 100 INJECTION, SOLUTION SUBCUTANEOUS at 22:14

## 2020-08-21 RX ADMIN — BUDESONIDE AND FORMOTEROL FUMARATE DIHYDRATE 2 PUFF(S): 160; 4.5 AEROSOL RESPIRATORY (INHALATION) at 05:52

## 2020-08-21 RX ADMIN — Medication 5 UNIT(S): at 09:09

## 2020-08-21 RX ADMIN — Medication 400 MILLIGRAM(S): at 13:02

## 2020-08-21 RX ADMIN — APIXABAN 5 MILLIGRAM(S): 2.5 TABLET, FILM COATED ORAL at 05:51

## 2020-08-21 RX ADMIN — AZITHROMYCIN 250 MILLIGRAM(S): 500 TABLET, FILM COATED ORAL at 17:44

## 2020-08-21 RX ADMIN — Medication 5 MILLILITER(S): at 17:45

## 2020-08-21 RX ADMIN — TIOTROPIUM BROMIDE 1 CAPSULE(S): 18 CAPSULE ORAL; RESPIRATORY (INHALATION) at 13:13

## 2020-08-21 RX ADMIN — PANTOPRAZOLE SODIUM 40 MILLIGRAM(S): 20 TABLET, DELAYED RELEASE ORAL at 05:52

## 2020-08-21 RX ADMIN — Medication 180 MILLIGRAM(S): at 05:51

## 2020-08-21 RX ADMIN — Medication 5 UNIT(S): at 18:56

## 2020-08-21 RX ADMIN — Medication 2000 UNIT(S): at 13:11

## 2020-08-21 RX ADMIN — SENNA PLUS 2 TABLET(S): 8.6 TABLET ORAL at 22:10

## 2020-08-21 RX ADMIN — BUMETANIDE 1 MILLIGRAM(S): 0.25 INJECTION INTRAMUSCULAR; INTRAVENOUS at 05:50

## 2020-08-21 RX ADMIN — Medication 1 TABLET(S): at 13:10

## 2020-08-21 RX ADMIN — Medication 5 MILLILITER(S): at 05:51

## 2020-08-21 RX ADMIN — Medication 1000 MILLIGRAM(S): at 13:32

## 2020-08-21 RX ADMIN — Medication 3 MILLILITER(S): at 13:09

## 2020-08-21 RX ADMIN — APIXABAN 5 MILLIGRAM(S): 2.5 TABLET, FILM COATED ORAL at 17:41

## 2020-08-21 RX ADMIN — ATORVASTATIN CALCIUM 80 MILLIGRAM(S): 80 TABLET, FILM COATED ORAL at 22:09

## 2020-08-21 RX ADMIN — CHLORHEXIDINE GLUCONATE 1 APPLICATION(S): 213 SOLUTION TOPICAL at 13:20

## 2020-08-21 RX ADMIN — ACETAZOLAMIDE 250 MILLIGRAM(S): 250 TABLET ORAL at 13:18

## 2020-08-21 RX ADMIN — BUDESONIDE AND FORMOTEROL FUMARATE DIHYDRATE 2 PUFF(S): 160; 4.5 AEROSOL RESPIRATORY (INHALATION) at 17:42

## 2020-08-21 RX ADMIN — Medication 3 MILLILITER(S): at 17:42

## 2020-08-21 NOTE — PROGRESS NOTE ADULT - PROBLEM SELECTOR PLAN 2
Given Diamox with poor response. Restarted on IV Lasix, but developed worsening Cr and metabolic alkalosis, lasix held. Now on Bumex w improvement in LE edema. Worsening metabolic alkalosis today- 1 dose of Diamox ordered after d/w pulm Dr Diane.     Patient has a primary respiratory acidosis with a secondary metabolic alkalosis.

## 2020-08-21 NOTE — PROGRESS NOTE ADULT - SUBJECTIVE AND OBJECTIVE BOX
CARDIOLOGY FOLLOW UP - Dr. Brunson    CC no acute complaints       PHYSICAL EXAM:  T(C): 36.9 (08-21-20 @ 05:45), Max: 36.9 (08-21-20 @ 05:45)  HR: 95 (08-21-20 @ 08:33) (82 - 95)  BP: 142/68 (08-21-20 @ 05:45) (136/81 - 145/71)  RR: 18 (08-21-20 @ 05:45) (18 - 18)  SpO2: 94% (08-21-20 @ 08:33) (94% - 100%)  Wt(kg): --  I&O's Summary    20 Aug 2020 07:01  -  21 Aug 2020 07:00  --------------------------------------------------------  IN: 830 mL / OUT: 1550 mL / NET: -720 mL    21 Aug 2020 07:01  -  21 Aug 2020 15:57  --------------------------------------------------------  IN: 0 mL / OUT: 400 mL / NET: -400 mL        Appearance: Normal	  Cardiovascular: Normal S1 S2,RRR, No JVD, No murmurs  Respiratory: diminished   Gastrointestinal:  Soft, Non-tender, + BS	  Extremities: Normal range of motion, No clubbing, b/l le edema +2        MEDICATIONS  (STANDING):  albuterol/ipratropium for Nebulization 3 milliLiter(s) Nebulizer every 6 hours  apixaban 5 milliGRAM(s) Oral every 12 hours  aspirin enteric coated 81 milliGRAM(s) Oral daily  atorvastatin 80 milliGRAM(s) Oral at bedtime  azithromycin   Tablet 250 milliGRAM(s) Oral <User Schedule>  Biotene Dry Mouth Oral Rinse 5 milliLiter(s) Swish and Spit two times a day  bisacodyl Suppository 10 milliGRAM(s) Rectal daily  budesonide 160 MICROgram(s)/formoterol 4.5 MICROgram(s) Inhaler 2 Puff(s) Inhalation two times a day  buMETAnide 1 milliGRAM(s) Oral daily  chlorhexidine 2% Cloths 1 Application(s) Topical daily  cholecalciferol 2000 Unit(s) Oral daily  dextrose 5%. 1000 milliLiter(s) (50 mL/Hr) IV Continuous <Continuous>  dextrose 50% Injectable 25 Gram(s) IV Push once  diltiazem    milliGRAM(s) Oral daily  insulin glargine Injectable (LANTUS) 20 Unit(s) SubCutaneous at bedtime  insulin lispro (HumaLOG) corrective regimen sliding scale   SubCutaneous three times a day before meals  insulin lispro (HumaLOG) corrective regimen sliding scale   SubCutaneous at bedtime  insulin lispro Injectable (HumaLOG) 5 Unit(s) SubCutaneous three times a day with meals  lactulose Syrup 15 Gram(s) Oral two times a day  montelukast 10 milliGRAM(s) Oral daily  multivitamin 1 Tablet(s) Oral daily  pantoprazole    Tablet 40 milliGRAM(s) Oral before breakfast  polyethylene glycol 3350 17 Gram(s) Oral daily  senna 2 Tablet(s) Oral at bedtime  theophylline ER (24 Hour) 400 milliGRAM(s) Oral daily  tiotropium 18 MICROgram(s) Capsule 1 Capsule(s) Inhalation daily      TELEMETRY: 	    ECG:  	  RADIOLOGY:   DIAGNOSTIC TESTING:  [ ] Echocardiogram:  [ ]  Catheterization:  [ ] Stress Test:    OTHER: 	    LABS:	 	                                7.8    6.96  )-----------( 187      ( 21 Aug 2020 08:53 )             26.8     08-21    136  |  92<L>  |  20  ----------------------------<  131<H>  3.6   |  40<H>  |  0.82    Ca    9.3      21 Aug 2020 08:53

## 2020-08-21 NOTE — PROGRESS NOTE ADULT - SUBJECTIVE AND OBJECTIVE BOX
CHIEF COMPLAINT: Patient is a 60y old  Female who presents with a chief complaint of 60F p/w generalized weakness and difficulty walking (14 Aug 2020 15:22)          OVERNIGHT EVENTS    currently on 4L O2    used BIPAP 15/5 overnight and using intermittently in daytime  Denies wheezing  Denies cough/sputum  Denies fever     ROS:  CONSTITUTIONAL: edematous  CARDIOVASCULAR: Denies chest pain or palpitations  PULMONARY: Dyspnea, no hemoptysis  MSK: c/o foot pain  [x] REMAINING REVIEW OF SYSTEMS NEGATIVE             Vital Signs Last 24 Hrs  T(C): 36.9 (21 Aug 2020 05:45), Max: 36.9 (21 Aug 2020 05:45)  T(F): 98.4 (21 Aug 2020 05:45), Max: 98.4 (21 Aug 2020 05:45)  HR: 95 (21 Aug 2020 08:33) (82 - 95)  BP: 142/68 (21 Aug 2020 05:45) (136/81 - 145/71)  BP(mean): --  RR: 18 (21 Aug 2020 05:45) (18 - 18)  SpO2: 94% (21 Aug 2020 08:33) (94% - 100%)        GENERAL:  AAOx3,  able to speak clearly for prolonged duration w/o dyspnea  EYES: anicteric, EOMI  EAR/NOSE/MOUTH/THROAT: NCAT, MMM, nares clear, trachea midline, no thrush  CARDIOVASCULAR: RRR, S1S2, systolic murmur  RESPIRATORY: prolonged expiratory phase, no wheezing  ABDOMEN: soft, obese, NT, ND, +BS  EXTREMITIES:  bilateral LE edema, pitting up to thighs  SKIN: LE wrapped bilaterally, large blister on left foot now wrapped  MUSCULOSKELETAL: strength diminished  NEUROLOGIC: nonfocal exam, pain in back side                                    7.8    6.96  )-----------( 187      ( 21 Aug 2020 08:53 )             26.8   08-21    136  |  92<L>  |  20  ----------------------------<  131<H>  3.6   |  40<H>  |  0.82    Ca    9.3      21 Aug 2020 08:53      MEDICATIONS  (STANDING):  albuterol/ipratropium for Nebulization 3 milliLiter(s) Nebulizer every 6 hours  apixaban 5 milliGRAM(s) Oral every 12 hours  aspirin enteric coated 81 milliGRAM(s) Oral daily  atorvastatin 80 milliGRAM(s) Oral at bedtime  azithromycin   Tablet 250 milliGRAM(s) Oral <User Schedule>  Biotene Dry Mouth Oral Rinse 5 milliLiter(s) Swish and Spit two times a day  bisacodyl Suppository 10 milliGRAM(s) Rectal daily  budesonide 160 MICROgram(s)/formoterol 4.5 MICROgram(s) Inhaler 2 Puff(s) Inhalation two times a day  buMETAnide 1 milliGRAM(s) Oral daily  chlorhexidine 2% Cloths 1 Application(s) Topical daily  cholecalciferol 2000 Unit(s) Oral daily  dextrose 5%. 1000 milliLiter(s) (50 mL/Hr) IV Continuous <Continuous>  dextrose 50% Injectable 25 Gram(s) IV Push once  diltiazem    milliGRAM(s) Oral daily  insulin glargine Injectable (LANTUS) 20 Unit(s) SubCutaneous at bedtime  insulin lispro (HumaLOG) corrective regimen sliding scale   SubCutaneous three times a day before meals  insulin lispro (HumaLOG) corrective regimen sliding scale   SubCutaneous at bedtime  insulin lispro Injectable (HumaLOG) 5 Unit(s) SubCutaneous three times a day with meals  lactulose Syrup 15 Gram(s) Oral two times a day  montelukast 10 milliGRAM(s) Oral daily  multivitamin 1 Tablet(s) Oral daily  pantoprazole    Tablet 40 milliGRAM(s) Oral before breakfast  polyethylene glycol 3350 17 Gram(s) Oral daily  senna 2 Tablet(s) Oral at bedtime  theophylline ER (24 Hour) 400 milliGRAM(s) Oral daily  tiotropium 18 MICROgram(s) Capsule 1 Capsule(s) Inhalation daily              ECHO: < from: Transthoracic Echocardiogram (08.05.20 @ 02:57) >  Mitral Valve: Normal mitral valve. Minimal mitral  regurgitation.  Aortic Valve/Aorta: Aortic valve not well visualized. Peak  transaortic valve gradient equals 13 mm Hg, mean  transaortic valve gradient equals 6 mm Hg, aortic valve  velocity time integral equals 30 cm. Peak left ventricular  outflow tract gradient equals 5 mm Hg, mean gradient is  equal to 2 mm Hg, LVOT velocity time integral equals 15 cm.  Aortic Root: 3.4 cm.  Left Atrium: Normal left atrium.  Left Ventricle: Normal left ventricular systolic function.  No segmental wall motion abnormalities. Normal left  ventricular internal dimensions and wall thicknesses. Mild  diastolic dysfunction (Stage I).  Right Heart: Normal right atrium. Normal right ventricular  size and systolic function. Normal tricuspid valve.  Pericardium/Pleura: Normal pericardium with no pericardial  effusion.  Hemodynamic: Estimated right atrial pressure is 8 mm Hg.  ------------------------------------------------------------------------  Conclusions:  1. Normal left ventricular internal dimensions and wall  thicknesses.  2. Normal left ventricular systolic function. No segmental  wall motion abnormalities.  3. Mild diastolic dysfunction (Stage I).  4. Normal right ventricular size and systolic function.    < end of copied text >

## 2020-08-21 NOTE — PROGRESS NOTE ADULT - SUBJECTIVE AND OBJECTIVE BOX
Patient is a 60y old  Female who presents with a chief complaint of 60F p/w generalized weakness and difficulty walking (21 Aug 2020 15:57)       INTERVAL HPI/OVERNIGHT EVENTS:  Patient seen and evaluated at bedside.  Pt is resting comfortable in NAD. Denies N/V/F/C.    Allergies    No Known Allergies    Intolerances    albuterol (Unknown)      Vital Signs Last 24 Hrs  T(C): 36.9 (21 Aug 2020 20:00), Max: 36.9 (21 Aug 2020 05:45)  T(F): 98.4 (21 Aug 2020 20:00), Max: 98.4 (21 Aug 2020 05:45)  HR: 91 (21 Aug 2020 20:00) (82 - 101)  BP: 136/72 (21 Aug 2020 20:00) (131/70 - 145/71)  BP(mean): --  RR: 20 (21 Aug 2020 20:00) (18 - 20)  SpO2: 97% (21 Aug 2020 20:00) (94% - 100%)    LABS:                        7.8    6.96  )-----------( 187      ( 21 Aug 2020 08:53 )             26.8     08-21    136  |  92<L>  |  20  ----------------------------<  131<H>  3.6   |  40<H>  |  0.82    Ca    9.3      21 Aug 2020 08:53          CAPILLARY BLOOD GLUCOSE      POCT Blood Glucose.: 82 mg/dL (21 Aug 2020 21:16)  POCT Blood Glucose.: 101 mg/dL (21 Aug 2020 18:46)  POCT Blood Glucose.: 92 mg/dL (21 Aug 2020 12:27)  POCT Blood Glucose.: 145 mg/dL (21 Aug 2020 08:47)  POCT Blood Glucose.: 114 mg/dL (20 Aug 2020 22:12)      Lower Extremity Physical Exam:  Vascular: DP/PT n/p, B/L with known h/o raynauds, CFT <_5 seconds B/L, Temperature gradient _warm to cool, B/L.   Neuro: Epicritic sensation intact to the level of _toes, B/L.  Skin: Postive mycotic toenails x10  Wound #1:   Location: dorsum left forefoot  Size: 6cm diameter  Depth: superficial  Wound bed: bullae  Drainage:  clear serous fluid/fluctuant  Odor: none  Periwound: no clinical signs of infection  Etiology: bullae/dm

## 2020-08-21 NOTE — PROGRESS NOTE ADULT - ATTENDING COMMENTS
Agree with above NP note.  cv stable  continued leg pain, edema  continue bumex as ordered  diamox given today  await d/c planning to [facility]

## 2020-08-21 NOTE — PROGRESS NOTE ADULT - ASSESSMENT
61 y/o F with PMH of COPD on home oxygen 4L NC, AF on Eliquis, CAD s/p PCI p/w acute on chronic respiratory failure. Cont to be grossly volume overloaded but feels her dyspnea has improved since admission.     - her COPD is severe but seems to be back to her limited baseline status and she does not require further inpt stay for that reason  - cont diuresis w/ bumex   - increase in bicarb today, would give dose diamox x1 and repeat bicarb level tomorrow    - cont nocturnal bipap which provides her significant relief but also requiring intermittently in daytime- goal to decrease use in daytime   - cont NC daytime goal sat 90-95%  -s/p prednisone course, avoid further steroids if possible due to risk of worsening volume status  - cont spiriva and symbicort  - cont theophylline  - cont nebulizer tx  - on azithro MWF  - DVT ppx - on a/c for AF  - cont physical therapy  - CT from 2019 shows nodule vs more likely atelectasis in RLL (nonurgent CT chest to reevaluate, can be done as outpt if desired and can be done/followed at [facility] as part of their Transplant eval)

## 2020-08-21 NOTE — PROGRESS NOTE ADULT - PROBLEM SELECTOR PLAN 1
Dyspnea multifactorial in the setting of underlying lung disease, cardiovascular dysfunction, obesity, and deconditioning.     Given prolonged course of Prednisone, now tapered off. Overnight and prn Bipap. Pt comfortable off Bipap and able to speak in full sentences.     Pt belligerent and uncooperative during hospitalization, has refused to see many doctors including 2 pulmonary teams- now agreeable to see house pulm again, following.    Continue supplemental O2 with goal O2 sat > 88% - she does not need to be at 100%.

## 2020-08-21 NOTE — PROGRESS NOTE ADULT - SUBJECTIVE AND OBJECTIVE BOX
Patient is a 60y old  Female who presents with a chief complaint of 60F p/w generalized weakness and difficulty walking (20 Aug 2020 14:49)    HPI: Worried about LE swelling.     Vital Signs Last 24 Hrs  T(C): 36.9 (21 Aug 2020 05:45), Max: 36.9 (21 Aug 2020 05:45)  T(F): 98.4 (21 Aug 2020 05:45), Max: 98.4 (21 Aug 2020 05:45)  HR: 95 (21 Aug 2020 08:33) (82 - 95)  BP: 142/68 (21 Aug 2020 05:45) (136/81 - 145/71)  BP(mean): --  RR: 18 (21 Aug 2020 05:45) (18 - 18)  SpO2: 94% (21 Aug 2020 08:33) (94% - 100%)                          7.8    6.96  )-----------( 187      ( 21 Aug 2020 08:53 )             26.8     08-21    136  |  92<L>  |  20  ----------------------------<  131<H>  3.6   |  40<H>  |  0.82    Ca    9.3      21 Aug 2020 08:53    MEDICATIONS  (STANDING):  acetaminophen  IVPB .. 1000 milliGRAM(s) IV Intermittent once  acetaZOLAMIDE Injectable 250 milliGRAM(s) IV Push once  albuterol/ipratropium for Nebulization 3 milliLiter(s) Nebulizer every 6 hours  apixaban 5 milliGRAM(s) Oral every 12 hours  aspirin enteric coated 81 milliGRAM(s) Oral daily  atorvastatin 80 milliGRAM(s) Oral at bedtime  azithromycin   Tablet 250 milliGRAM(s) Oral <User Schedule>  Biotene Dry Mouth Oral Rinse 5 milliLiter(s) Swish and Spit two times a day  bisacodyl Suppository 10 milliGRAM(s) Rectal daily  budesonide 160 MICROgram(s)/formoterol 4.5 MICROgram(s) Inhaler 2 Puff(s) Inhalation two times a day  buMETAnide 1 milliGRAM(s) Oral daily  chlorhexidine 2% Cloths 1 Application(s) Topical daily  cholecalciferol 2000 Unit(s) Oral daily  dextrose 5%. 1000 milliLiter(s) (50 mL/Hr) IV Continuous <Continuous>  dextrose 50% Injectable 25 Gram(s) IV Push once  diltiazem    milliGRAM(s) Oral daily  insulin glargine Injectable (LANTUS) 20 Unit(s) SubCutaneous at bedtime  insulin lispro (HumaLOG) corrective regimen sliding scale   SubCutaneous three times a day before meals  insulin lispro (HumaLOG) corrective regimen sliding scale   SubCutaneous at bedtime  insulin lispro Injectable (HumaLOG) 5 Unit(s) SubCutaneous three times a day with meals  lactulose Syrup 15 Gram(s) Oral two times a day  montelukast 10 milliGRAM(s) Oral daily  multivitamin 1 Tablet(s) Oral daily  pantoprazole    Tablet 40 milliGRAM(s) Oral before breakfast  polyethylene glycol 3350 17 Gram(s) Oral daily  senna 2 Tablet(s) Oral at bedtime  theophylline ER (24 Hour) 400 milliGRAM(s) Oral daily  tiotropium 18 MICROgram(s) Capsule 1 Capsule(s) Inhalation daily    MEDICATIONS  (PRN):  ALPRAZolam 0.25 milliGRAM(s) Oral two times a day PRN Anxiety  glucagon  Injectable 1 milliGRAM(s) IntraMuscular once PRN Glucose LESS THAN 70 milligrams/deciliter  guaiFENesin   Syrup  (Sugar-Free) 100 milliGRAM(s) Oral every 6 hours PRN Cough

## 2020-08-21 NOTE — PROGRESS NOTE ADULT - ASSESSMENT
EVAN 1/7/19: no ALINA thrombus, unable to assess LV sys fx  echo 12/7/18: EF 71%, nl LV sys fx, mild diastolic dysfx     a/p    60 year old female with hx of D CHF, HTN, CAD s/p PCI, Afib/ aflutter, s/p Aflutter Ablation 12/2019, COPD, DM2, Anxiety, , raynaud phenomenon presenting with weakness, SOB , hyperkalemia.      1. Dyspnea, Resp failure  -secondary to chronic lung disease, COPD, volume overload  -pulm f/u   -covid 19 negative  -no acs , cv stable   -ecg with known RBBB, new twi noted, hyperkalemia also noted on admission  -echo with normal LVEF, mild diastolic dysfunction   -s/p PO Prednisone  -on Duoneb, Symbicort, Spiriva and Theophylline.    2. CAD s/p PCI   -stable, no cp  -c/w ASA, statin  -echo noted above     3. Afib/aflutter s/p  Aflutter Ablation 12/2019  -in NSR, cw cardizem  mg daily  -CHADsVac=2, c/w eliquis     4. Acute on Chronic Diastolic CHF   -dyspnea mostly secondary to copd  -intermittently on bipap   -echo with normal lVEF   -Has been on Diamox with poor response  -Restarted on IV Lasix, but held for worsening Cr and metabolic alkalosis   -creat stable, LE edema improved   -continue bumex 1mg daily  -s/p iv diamox today per pulm   -will closely monitor bicarb    no cv objection to dc planning to [facility] pulm rehab   dvt ppx

## 2020-08-21 NOTE — PROGRESS NOTE ADULT - ASSESSMENT
--Left dm foot bullae secondary to fluid retention    --no signs of infection present at this time, adaptic touch and dsd applied  --recommend continued daily betadine paint with adaptic and dsd left forefoot daily  --will monitor for signs of infection  --recommend continue podiatric footcare as outpatient with current dpm  --will monitor during this admission

## 2020-08-21 NOTE — PROGRESS NOTE ADULT - PROBLEM SELECTOR PLAN 3
A1C 7.5. Lantus and premeal insulin. Dose decreased for hypoglycemia yesterday- monitor. Please hold premeal if patient skips meals.

## 2020-08-21 NOTE — PROGRESS NOTE ADULT - ASSESSMENT
60F w COPD on 3LNC (?pending transplant list at [facility]), former smoker, CAD s/p stent (2016), afib on eliquis, uncontrolled DM2, raynaud phenomenon and recent hospitalization at [facility] for altered mental status and AURORA aw COPD exacerbation, LE swelling, weakness.

## 2020-08-22 LAB
ANION GAP SERPL CALC-SCNC: 9 MMOL/L — SIGNIFICANT CHANGE UP (ref 5–17)
BUN SERPL-MCNC: 23 MG/DL — SIGNIFICANT CHANGE UP (ref 7–23)
CALCIUM SERPL-MCNC: 9.6 MG/DL — SIGNIFICANT CHANGE UP (ref 8.4–10.5)
CHLORIDE SERPL-SCNC: 94 MMOL/L — LOW (ref 96–108)
CO2 SERPL-SCNC: 34 MMOL/L — HIGH (ref 22–31)
CREAT SERPL-MCNC: 0.89 MG/DL — SIGNIFICANT CHANGE UP (ref 0.5–1.3)
GLUCOSE BLDC GLUCOMTR-MCNC: 112 MG/DL — HIGH (ref 70–99)
GLUCOSE BLDC GLUCOMTR-MCNC: 137 MG/DL — HIGH (ref 70–99)
GLUCOSE BLDC GLUCOMTR-MCNC: 138 MG/DL — HIGH (ref 70–99)
GLUCOSE BLDC GLUCOMTR-MCNC: 183 MG/DL — HIGH (ref 70–99)
GLUCOSE SERPL-MCNC: 150 MG/DL — HIGH (ref 70–99)
HCT VFR BLD CALC: 25.9 % — LOW (ref 34.5–45)
HGB BLD-MCNC: 7.6 G/DL — LOW (ref 11.5–15.5)
MCHC RBC-ENTMCNC: 24.9 PG — LOW (ref 27–34)
MCHC RBC-ENTMCNC: 29.3 GM/DL — LOW (ref 32–36)
MCV RBC AUTO: 84.9 FL — SIGNIFICANT CHANGE UP (ref 80–100)
NRBC # BLD: 0 /100 WBCS — SIGNIFICANT CHANGE UP (ref 0–0)
PLATELET # BLD AUTO: 188 K/UL — SIGNIFICANT CHANGE UP (ref 150–400)
POTASSIUM SERPL-MCNC: 3.9 MMOL/L — SIGNIFICANT CHANGE UP (ref 3.5–5.3)
POTASSIUM SERPL-SCNC: 3.9 MMOL/L — SIGNIFICANT CHANGE UP (ref 3.5–5.3)
RBC # BLD: 3.05 M/UL — LOW (ref 3.8–5.2)
RBC # FLD: 14.9 % — HIGH (ref 10.3–14.5)
SODIUM SERPL-SCNC: 137 MMOL/L — SIGNIFICANT CHANGE UP (ref 135–145)
WBC # BLD: 7.68 K/UL — SIGNIFICANT CHANGE UP (ref 3.8–10.5)
WBC # FLD AUTO: 7.68 K/UL — SIGNIFICANT CHANGE UP (ref 3.8–10.5)

## 2020-08-22 PROCEDURE — 99232 SBSQ HOSP IP/OBS MODERATE 35: CPT

## 2020-08-22 RX ORDER — ACETAMINOPHEN 500 MG
1000 TABLET ORAL ONCE
Refills: 0 | Status: COMPLETED | OUTPATIENT
Start: 2020-08-22 | End: 2020-08-22

## 2020-08-22 RX ORDER — TRAMADOL HYDROCHLORIDE 50 MG/1
25 TABLET ORAL ONCE
Refills: 0 | Status: DISCONTINUED | OUTPATIENT
Start: 2020-08-22 | End: 2020-08-22

## 2020-08-22 RX ADMIN — Medication 3 MILLILITER(S): at 00:15

## 2020-08-22 RX ADMIN — Medication 3 MILLILITER(S): at 12:34

## 2020-08-22 RX ADMIN — Medication 5 MILLILITER(S): at 20:14

## 2020-08-22 RX ADMIN — Medication 3 MILLILITER(S): at 06:38

## 2020-08-22 RX ADMIN — TIOTROPIUM BROMIDE 1 CAPSULE(S): 18 CAPSULE ORAL; RESPIRATORY (INHALATION) at 12:34

## 2020-08-22 RX ADMIN — SENNA PLUS 2 TABLET(S): 8.6 TABLET ORAL at 22:10

## 2020-08-22 RX ADMIN — MONTELUKAST 10 MILLIGRAM(S): 4 TABLET, CHEWABLE ORAL at 12:33

## 2020-08-22 RX ADMIN — Medication 5 UNIT(S): at 09:46

## 2020-08-22 RX ADMIN — Medication 400 MILLIGRAM(S): at 22:06

## 2020-08-22 RX ADMIN — Medication 5 UNIT(S): at 14:52

## 2020-08-22 RX ADMIN — POLYETHYLENE GLYCOL 3350 17 GRAM(S): 17 POWDER, FOR SOLUTION ORAL at 22:11

## 2020-08-22 RX ADMIN — Medication 5 UNIT(S): at 20:11

## 2020-08-22 RX ADMIN — Medication 400 MILLIGRAM(S): at 15:24

## 2020-08-22 RX ADMIN — TRAMADOL HYDROCHLORIDE 25 MILLIGRAM(S): 50 TABLET ORAL at 09:19

## 2020-08-22 RX ADMIN — BUMETANIDE 1 MILLIGRAM(S): 0.25 INJECTION INTRAMUSCULAR; INTRAVENOUS at 06:39

## 2020-08-22 RX ADMIN — Medication 81 MILLIGRAM(S): at 12:33

## 2020-08-22 RX ADMIN — Medication 180 MILLIGRAM(S): at 06:39

## 2020-08-22 RX ADMIN — PANTOPRAZOLE SODIUM 40 MILLIGRAM(S): 20 TABLET, DELAYED RELEASE ORAL at 06:39

## 2020-08-22 RX ADMIN — CHLORHEXIDINE GLUCONATE 1 APPLICATION(S): 213 SOLUTION TOPICAL at 14:53

## 2020-08-22 RX ADMIN — TRAMADOL HYDROCHLORIDE 25 MILLIGRAM(S): 50 TABLET ORAL at 09:50

## 2020-08-22 RX ADMIN — Medication 3 MILLILITER(S): at 23:24

## 2020-08-22 RX ADMIN — Medication 100 MILLIGRAM(S): at 22:15

## 2020-08-22 RX ADMIN — TRAMADOL HYDROCHLORIDE 25 MILLIGRAM(S): 50 TABLET ORAL at 01:39

## 2020-08-22 RX ADMIN — TRAMADOL HYDROCHLORIDE 25 MILLIGRAM(S): 50 TABLET ORAL at 02:39

## 2020-08-22 RX ADMIN — INSULIN GLARGINE 20 UNIT(S): 100 INJECTION, SOLUTION SUBCUTANEOUS at 22:45

## 2020-08-22 RX ADMIN — Medication 1000 MILLIGRAM(S): at 22:36

## 2020-08-22 RX ADMIN — ATORVASTATIN CALCIUM 80 MILLIGRAM(S): 80 TABLET, FILM COATED ORAL at 22:08

## 2020-08-22 RX ADMIN — APIXABAN 5 MILLIGRAM(S): 2.5 TABLET, FILM COATED ORAL at 18:28

## 2020-08-22 RX ADMIN — Medication 1 TABLET(S): at 12:33

## 2020-08-22 RX ADMIN — APIXABAN 5 MILLIGRAM(S): 2.5 TABLET, FILM COATED ORAL at 06:39

## 2020-08-22 RX ADMIN — BUDESONIDE AND FORMOTEROL FUMARATE DIHYDRATE 2 PUFF(S): 160; 4.5 AEROSOL RESPIRATORY (INHALATION) at 18:28

## 2020-08-22 RX ADMIN — Medication 2000 UNIT(S): at 12:33

## 2020-08-22 RX ADMIN — Medication 1000 MILLIGRAM(S): at 15:50

## 2020-08-22 RX ADMIN — Medication 3 MILLILITER(S): at 18:29

## 2020-08-22 RX ADMIN — Medication 400 MILLIGRAM(S): at 12:33

## 2020-08-22 RX ADMIN — BUDESONIDE AND FORMOTEROL FUMARATE DIHYDRATE 2 PUFF(S): 160; 4.5 AEROSOL RESPIRATORY (INHALATION) at 06:41

## 2020-08-22 RX ADMIN — Medication 5 MILLILITER(S): at 06:40

## 2020-08-22 NOTE — PROGRESS NOTE ADULT - SUBJECTIVE AND OBJECTIVE BOX
[facility] Division of Hospital Medicine  Julia DO George  Pager (NADEEM-F, 6B-6W): [phone]  Other Times:  [phone]    Patient is a 60y old  Female who presents with a chief complaint of 60F p/w generalized weakness and difficulty walking (22 Aug 2020 10:04)      SUBJECTIVE / OVERNIGHT EVENTS: No events overnight. Wore bipap for sleep. This morning patient was very terrence, did not feel like answering questions, wanted me to just look in her chart. Says her breathing is at her baseline.  ADDITIONAL REVIEW OF SYSTEMS: negative    MEDICATIONS  (STANDING):  albuterol/ipratropium for Nebulization 3 milliLiter(s) Nebulizer every 6 hours  apixaban 5 milliGRAM(s) Oral every 12 hours  aspirin enteric coated 81 milliGRAM(s) Oral daily  atorvastatin 80 milliGRAM(s) Oral at bedtime  azithromycin   Tablet 250 milliGRAM(s) Oral <User Schedule>  Biotene Dry Mouth Oral Rinse 5 milliLiter(s) Swish and Spit two times a day  bisacodyl Suppository 10 milliGRAM(s) Rectal daily  budesonide 160 MICROgram(s)/formoterol 4.5 MICROgram(s) Inhaler 2 Puff(s) Inhalation two times a day  buMETAnide 1 milliGRAM(s) Oral daily  chlorhexidine 2% Cloths 1 Application(s) Topical daily  cholecalciferol 2000 Unit(s) Oral daily  dextrose 5%. 1000 milliLiter(s) (50 mL/Hr) IV Continuous <Continuous>  dextrose 50% Injectable 25 Gram(s) IV Push once  diltiazem    milliGRAM(s) Oral daily  insulin glargine Injectable (LANTUS) 20 Unit(s) SubCutaneous at bedtime  insulin lispro (HumaLOG) corrective regimen sliding scale   SubCutaneous three times a day before meals  insulin lispro (HumaLOG) corrective regimen sliding scale   SubCutaneous at bedtime  insulin lispro Injectable (HumaLOG) 5 Unit(s) SubCutaneous three times a day with meals  lactulose Syrup 15 Gram(s) Oral two times a day  montelukast 10 milliGRAM(s) Oral daily  multivitamin 1 Tablet(s) Oral daily  pantoprazole    Tablet 40 milliGRAM(s) Oral before breakfast  polyethylene glycol 3350 17 Gram(s) Oral daily  senna 2 Tablet(s) Oral at bedtime  theophylline ER (24 Hour) 400 milliGRAM(s) Oral daily  tiotropium 18 MICROgram(s) Capsule 1 Capsule(s) Inhalation daily    MEDICATIONS  (PRN):  ALPRAZolam 0.25 milliGRAM(s) Oral two times a day PRN Anxiety  glucagon  Injectable 1 milliGRAM(s) IntraMuscular once PRN Glucose LESS THAN 70 milligrams/deciliter  guaiFENesin   Syrup  (Sugar-Free) 100 milliGRAM(s) Oral every 6 hours PRN Cough      CAPILLARY BLOOD GLUCOSE      POCT Blood Glucose.: 112 mg/dL (22 Aug 2020 14:39)  POCT Blood Glucose.: 137 mg/dL (22 Aug 2020 09:34)  POCT Blood Glucose.: 82 mg/dL (21 Aug 2020 21:16)  POCT Blood Glucose.: 101 mg/dL (21 Aug 2020 18:46)    I&O's Summary    21 Aug 2020 07:01  -  22 Aug 2020 07:00  --------------------------------------------------------  IN: 100 mL / OUT: 1200 mL / NET: -1100 mL    22 Aug 2020 07:01  -  22 Aug 2020 16:19  --------------------------------------------------------  IN: 120 mL / OUT: 500 mL / NET: -380 mL        PHYSICAL EXAM:  Vital Signs Last 24 Hrs  T(C): 36.7 (22 Aug 2020 12:20), Max: 36.9 (21 Aug 2020 20:00)  T(F): 98.1 (22 Aug 2020 12:20), Max: 98.4 (21 Aug 2020 20:00)  HR: 79 (22 Aug 2020 12:20) (74 - 94)  BP: 121/66 (22 Aug 2020 12:20) (121/66 - 136/72)  BP(mean): --  RR: 18 (22 Aug 2020 12:20) (18 - 20)  SpO2: 96% (22 Aug 2020 12:20) (94% - 99%)    CONSTITUTIONAL: no respiratory distress  EYES: PERRLA; conjunctiva and sclera clear  ENMT: Moist oral mucosa, no pharyngeal injection or exudates; normal dentition  NECK: Supple, no palpable masses; no thyromegaly  RESPIRATORY: Normal respiratory effort; lungs are clear to auscultation bilaterally, no wheezes but decreased sounds overall  CARDIOVASCULAR: Regular rate and rhythm, normal S1 and S2, no murmur/rub/gallop; No lower extremity edema; Peripheral pulses are 2+ bilaterally  ABDOMEN: Nontender to palpation, normoactive bowel sounds, no rebound/guarding; No hepatosplenomegaly  MUSCULOSKELETAL:  Normal gait; no clubbing or cyanosis of digits; no joint swelling or tenderness to palpation  PSYCH: A+O to person, place, and time; no hallucinations  NEUROLOGY: CN 2-12 are intact and symmetric; no gross sensory deficits   SKIN: No rashes; no palpable lesions    LABS:                        7.6    7.68  )-----------( 188      ( 22 Aug 2020 07:03 )             25.9     08-22    137  |  94<L>  |  23  ----------------------------<  150<H>  3.9   |  34<H>  |  0.89    Ca    9.6      22 Aug 2020 07:03

## 2020-08-22 NOTE — PROGRESS NOTE ADULT - ASSESSMENT
EVAN 1/7/19: no ALINA thrombus, unable to assess LV sys fx  echo 12/7/18: EF 71%, nl LV sys fx, mild diastolic dysfx     a/p    60 year old female with hx of D CHF, HTN, CAD s/p PCI, Afib/ aflutter, s/p Aflutter Ablation 12/2019, COPD, DM2, Anxiety, , raynaud phenomenon presenting with weakness, SOB , hyperkalemia.      1. Dyspnea, Resp failure  -secondary to chronic lung disease, COPD, volume overload  -pulm f/u   -covid 19 negative  -no acs , cv stable   -ecg with known RBBB, new twi noted, hyperkalemia also noted on admission  -echo with normal LVEF, mild diastolic dysfunction   -s/p PO Prednisone  -on Duoneb, Symbicort, Spiriva and Theophylline.    2. CAD s/p PCI   -stable, no cp  -c/w ASA, statin  -echo noted above     3. Afib/aflutter s/p  Aflutter Ablation 12/2019  -in NSR, cw cardizem  mg daily  -CHADsVac=2, c/w eliquis     4. Acute on Chronic Diastolic CHF   -dyspnea mostly secondary to copd  -intermittently on bipap   -echo with normal lVEF   -Restarted on IV Lasix, but held for worsening Cr and metabolic alkalosis   -creat stable, LE edema improved   -continue bumex 1mg daily, diamox prn  -closely monitor bicarb    no cv objection to dc planning to [facility] pulm rehab   dvt ppx

## 2020-08-22 NOTE — PROGRESS NOTE ADULT - PROBLEM SELECTOR PLAN 2
Given Diamox with poor response. Restarted on IV Lasix, but developed worsening Cr and metabolic alkalosis, lasix held. Now on Bumex w improvement in LE edema. Worsening metabolic alkalosis - 1 dose of Diamox given after d/w pulm Dr Diane.     Patient has a primary respiratory acidosis with a secondary metabolic alkalosis.    Cont bumex 1mg qd

## 2020-08-22 NOTE — PROGRESS NOTE ADULT - SUBJECTIVE AND OBJECTIVE BOX
CARDIOLOGY FOLLOW UP NOTE - DR. THOMAS    Subjective:    still c/o leg pain, edema  stable dyspnea  denies chest pain, palpitations, dizziness  ROS: otherwise negative   overnight events:      PHYSICAL EXAM:  T(C): 36.4 (08-22-20 @ 08:12), Max: 36.9 (08-21-20 @ 20:00)  HR: 92 (08-22-20 @ 09:36) (74 - 101)  BP: 133/81 (08-22-20 @ 08:12) (128/77 - 136/72)  RR: 18 (08-22-20 @ 08:12) (18 - 20)  SpO2: 95% (08-22-20 @ 09:36) (94% - 100%)  Wt(kg): --  I&O's Summary    21 Aug 2020 07:01  -  22 Aug 2020 07:00  --------------------------------------------------------  IN: 100 mL / OUT: 1200 mL / NET: -1100 mL    22 Aug 2020 07:01  -  22 Aug 2020 10:04  --------------------------------------------------------  IN: 120 mL / OUT: 500 mL / NET: -380 mL      Daily     Daily     Appearance: Normal	  Cardiovascular: Normal S1 S2,RRR, No JVD, No murmurs  Respiratory: Lungs clear to auscultation	  Gastrointestinal:  Soft, Non-tender, + BS	  Extremities: Normal range of motion, ++ edema but improved      Home Medications:  apixaban 5 mg oral tablet: 1 tab(s) orally every 12 hours (01 Aug 2020 21:52)  aspirin 81 mg oral delayed release tablet: 1 tab(s) orally once a day (01 Aug 2020 21:52)  CoQ10: 200 milligram(s) orally once a day (01 Aug 2020 21:52)  metFORMIN 500 mg oral tablet: 1 tab(s) orally 2 times a day (01 Aug 2020 21:52)  Multiple Vitamins oral tablet: 1 tab(s) orally once a day (01 Aug 2020 21:52)  nitroglycerin 0.4 mg sublingual tablet: 1 tab(s) sublingual every 5 minutes, As Needed (01 Aug 2020 21:52)  Onglyza 5 mg oral tablet: 1 tab(s) orally once a day (01 Aug 2020 21:52)  pantoprazole 40 mg oral delayed release tablet: 1 tab(s) orally once a day (before a meal) (01 Aug 2020 21:52)  predniSONE 20 mg oral tablet: 2 tab(s) orally once a day (01 Aug 2020 21:52)  rosuvastatin 20 mg oral tablet: 1 tab(s) orally once a day (01 Aug 2020 21:52)  Singulair 10 mg oral tablet: 1 tab(s) orally once a day (in the evening) (01 Aug 2020 21:52)  Spiriva 18 mcg inhalation capsule: 1 cap(s) inhaled once a day (01 Aug 2020 21:52)  Symbicort 160 mcg-4.5 mcg/inh inhalation aerosol: 2 puff(s) inhaled 2 times a day (01 Aug 2020 21:52)  theophylline 400 mg/24 hours oral tablet, extended release: 1 tab(s) orally once a day (01 Aug 2020 21:52)  Ventolin HFA 90 mcg/inh inhalation aerosol: 2 puff(s) inhaled 2 times a day (01 Aug 2020 21:52)  Vitamin D3 2000 intl units oral tablet: 1 tab(s) orally once a day (01 Aug 2020 21:52)      MEDICATIONS  (STANDING):  albuterol/ipratropium for Nebulization 3 milliLiter(s) Nebulizer every 6 hours  apixaban 5 milliGRAM(s) Oral every 12 hours  aspirin enteric coated 81 milliGRAM(s) Oral daily  atorvastatin 80 milliGRAM(s) Oral at bedtime  azithromycin   Tablet 250 milliGRAM(s) Oral <User Schedule>  Biotene Dry Mouth Oral Rinse 5 milliLiter(s) Swish and Spit two times a day  bisacodyl Suppository 10 milliGRAM(s) Rectal daily  budesonide 160 MICROgram(s)/formoterol 4.5 MICROgram(s) Inhaler 2 Puff(s) Inhalation two times a day  buMETAnide 1 milliGRAM(s) Oral daily  chlorhexidine 2% Cloths 1 Application(s) Topical daily  cholecalciferol 2000 Unit(s) Oral daily  dextrose 5%. 1000 milliLiter(s) (50 mL/Hr) IV Continuous <Continuous>  dextrose 50% Injectable 25 Gram(s) IV Push once  diltiazem    milliGRAM(s) Oral daily  insulin glargine Injectable (LANTUS) 20 Unit(s) SubCutaneous at bedtime  insulin lispro (HumaLOG) corrective regimen sliding scale   SubCutaneous three times a day before meals  insulin lispro (HumaLOG) corrective regimen sliding scale   SubCutaneous at bedtime  insulin lispro Injectable (HumaLOG) 5 Unit(s) SubCutaneous three times a day with meals  lactulose Syrup 15 Gram(s) Oral two times a day  montelukast 10 milliGRAM(s) Oral daily  multivitamin 1 Tablet(s) Oral daily  pantoprazole    Tablet 40 milliGRAM(s) Oral before breakfast  polyethylene glycol 3350 17 Gram(s) Oral daily  senna 2 Tablet(s) Oral at bedtime  theophylline ER (24 Hour) 400 milliGRAM(s) Oral daily  tiotropium 18 MICROgram(s) Capsule 1 Capsule(s) Inhalation daily      TELEMETRY: 	    ECG:  	  RADIOLOGY:   DIAGNOSTIC TESTING:  [ ] Echocardiogram:  [ ] Catheterization:  [ ] Stress Test:    OTHER: 	    LABS:	 	    CARDIAC MARKERS:                                7.6    7.68  )-----------( 188      ( 22 Aug 2020 07:03 )             25.9     08-22    137  |  94<L>  |  23  ----------------------------<  150<H>  3.9   |  34<H>  |  0.89    Ca    9.6      22 Aug 2020 07:03      proBNP:     Lipid Profile:   HgA1c:     Creatinine, Serum: 0.89 mg/dL (08-22-20 @ 07:03)  Creatinine, Serum: 0.82 mg/dL (08-21-20 @ 08:53)

## 2020-08-22 NOTE — PROGRESS NOTE ADULT - ASSESSMENT
60F w COPD on 3LNC (?pending transplant list at [facility]), former smoker, CAD s/p stent (2016), afib on eliquis, uncontrolled DM2, raynaud phenomenon and recent hospitalization at [facility] for altered mental status and [facility] aw COPD exacerbation, LE swelling, weakness.

## 2020-08-22 NOTE — PROGRESS NOTE ADULT - ATTENDING COMMENTS
Discharge: pending acceptance to [facility] pulmonary rehab, if not accepted will need to go to another rehab center    Julia Vazquez DO  Pager #: [phone]

## 2020-08-22 NOTE — PROGRESS NOTE ADULT - PROBLEM SELECTOR PLAN 1
Dyspnea multifactorial in the setting of underlying lung disease, cardiovascular dysfunction, obesity, and deconditioning.     Given prolonged course of Prednisone, now tapered off. Overnight and prn Bipap. Pt comfortable off Bipap and able to speak in full sentences.   - cont britney, karen, spiriva, symbicort, singulair    Pt belligerent and uncooperative during hospitalization, has refused to see many doctors including 2 pulmonary teams- now agreeable to see house pulm again, following.    Continue supplemental O2 with goal O2 sat > 88% - she does not need to be at 100%.

## 2020-08-22 NOTE — PROGRESS NOTE ADULT - PROBLEM SELECTOR PLAN 3
A1C 7.5. Lantus and premeal insulin. Dose decreased for hypoglycemia yesterday- monitor. Hold premeal if patient skips meals.

## 2020-08-23 LAB
ANION GAP SERPL CALC-SCNC: 9 MMOL/L — SIGNIFICANT CHANGE UP (ref 5–17)
BUN SERPL-MCNC: 23 MG/DL — SIGNIFICANT CHANGE UP (ref 7–23)
CALCIUM SERPL-MCNC: 10.1 MG/DL — SIGNIFICANT CHANGE UP (ref 8.4–10.5)
CHLORIDE SERPL-SCNC: 94 MMOL/L — LOW (ref 96–108)
CO2 SERPL-SCNC: 35 MMOL/L — HIGH (ref 22–31)
CREAT SERPL-MCNC: 0.9 MG/DL — SIGNIFICANT CHANGE UP (ref 0.5–1.3)
GLUCOSE BLDC GLUCOMTR-MCNC: 121 MG/DL — HIGH (ref 70–99)
GLUCOSE BLDC GLUCOMTR-MCNC: 134 MG/DL — HIGH (ref 70–99)
GLUCOSE BLDC GLUCOMTR-MCNC: 138 MG/DL — HIGH (ref 70–99)
GLUCOSE BLDC GLUCOMTR-MCNC: 166 MG/DL — HIGH (ref 70–99)
GLUCOSE SERPL-MCNC: 121 MG/DL — HIGH (ref 70–99)
HCT VFR BLD CALC: 26.4 % — LOW (ref 34.5–45)
HGB BLD-MCNC: 7.6 G/DL — LOW (ref 11.5–15.5)
MAGNESIUM SERPL-MCNC: 2 MG/DL — SIGNIFICANT CHANGE UP (ref 1.6–2.6)
MCHC RBC-ENTMCNC: 24.5 PG — LOW (ref 27–34)
MCHC RBC-ENTMCNC: 28.8 GM/DL — LOW (ref 32–36)
MCV RBC AUTO: 85.2 FL — SIGNIFICANT CHANGE UP (ref 80–100)
NRBC # BLD: 0 /100 WBCS — SIGNIFICANT CHANGE UP (ref 0–0)
PHOSPHATE SERPL-MCNC: 4.3 MG/DL — SIGNIFICANT CHANGE UP (ref 2.5–4.5)
PLATELET # BLD AUTO: 184 K/UL — SIGNIFICANT CHANGE UP (ref 150–400)
POTASSIUM SERPL-MCNC: 3.9 MMOL/L — SIGNIFICANT CHANGE UP (ref 3.5–5.3)
POTASSIUM SERPL-SCNC: 3.9 MMOL/L — SIGNIFICANT CHANGE UP (ref 3.5–5.3)
RBC # BLD: 3.1 M/UL — LOW (ref 3.8–5.2)
RBC # FLD: 14.9 % — HIGH (ref 10.3–14.5)
SODIUM SERPL-SCNC: 138 MMOL/L — SIGNIFICANT CHANGE UP (ref 135–145)
WBC # BLD: 7 K/UL — SIGNIFICANT CHANGE UP (ref 3.8–10.5)
WBC # FLD AUTO: 7 K/UL — SIGNIFICANT CHANGE UP (ref 3.8–10.5)

## 2020-08-23 PROCEDURE — 99233 SBSQ HOSP IP/OBS HIGH 50: CPT

## 2020-08-23 RX ORDER — TRAMADOL HYDROCHLORIDE 50 MG/1
25 TABLET ORAL ONCE
Refills: 0 | Status: DISCONTINUED | OUTPATIENT
Start: 2020-08-23 | End: 2020-08-23

## 2020-08-23 RX ORDER — BUMETANIDE 0.25 MG/ML
1 INJECTION INTRAMUSCULAR; INTRAVENOUS ONCE
Refills: 0 | Status: COMPLETED | OUTPATIENT
Start: 2020-08-23 | End: 2020-08-23

## 2020-08-23 RX ADMIN — BUMETANIDE 1 MILLIGRAM(S): 0.25 INJECTION INTRAMUSCULAR; INTRAVENOUS at 05:16

## 2020-08-23 RX ADMIN — Medication 5 UNIT(S): at 19:29

## 2020-08-23 RX ADMIN — APIXABAN 5 MILLIGRAM(S): 2.5 TABLET, FILM COATED ORAL at 18:27

## 2020-08-23 RX ADMIN — CHLORHEXIDINE GLUCONATE 1 APPLICATION(S): 213 SOLUTION TOPICAL at 12:03

## 2020-08-23 RX ADMIN — TRAMADOL HYDROCHLORIDE 25 MILLIGRAM(S): 50 TABLET ORAL at 21:05

## 2020-08-23 RX ADMIN — TRAMADOL HYDROCHLORIDE 25 MILLIGRAM(S): 50 TABLET ORAL at 05:34

## 2020-08-23 RX ADMIN — Medication 2000 UNIT(S): at 11:57

## 2020-08-23 RX ADMIN — Medication 5 UNIT(S): at 09:56

## 2020-08-23 RX ADMIN — Medication 5 UNIT(S): at 13:15

## 2020-08-23 RX ADMIN — POLYETHYLENE GLYCOL 3350 17 GRAM(S): 17 POWDER, FOR SOLUTION ORAL at 21:07

## 2020-08-23 RX ADMIN — SENNA PLUS 2 TABLET(S): 8.6 TABLET ORAL at 22:40

## 2020-08-23 RX ADMIN — Medication 81 MILLIGRAM(S): at 11:57

## 2020-08-23 RX ADMIN — BUDESONIDE AND FORMOTEROL FUMARATE DIHYDRATE 2 PUFF(S): 160; 4.5 AEROSOL RESPIRATORY (INHALATION) at 05:15

## 2020-08-23 RX ADMIN — Medication 3 MILLILITER(S): at 18:27

## 2020-08-23 RX ADMIN — MONTELUKAST 10 MILLIGRAM(S): 4 TABLET, CHEWABLE ORAL at 11:56

## 2020-08-23 RX ADMIN — Medication 400 MILLIGRAM(S): at 11:56

## 2020-08-23 RX ADMIN — BUMETANIDE 1 MILLIGRAM(S): 0.25 INJECTION INTRAMUSCULAR; INTRAVENOUS at 14:43

## 2020-08-23 RX ADMIN — TRAMADOL HYDROCHLORIDE 25 MILLIGRAM(S): 50 TABLET ORAL at 23:07

## 2020-08-23 RX ADMIN — Medication 3 MILLILITER(S): at 11:57

## 2020-08-23 RX ADMIN — TIOTROPIUM BROMIDE 1 CAPSULE(S): 18 CAPSULE ORAL; RESPIRATORY (INHALATION) at 11:56

## 2020-08-23 RX ADMIN — Medication 5 MILLILITER(S): at 21:07

## 2020-08-23 RX ADMIN — PANTOPRAZOLE SODIUM 40 MILLIGRAM(S): 20 TABLET, DELAYED RELEASE ORAL at 05:16

## 2020-08-23 RX ADMIN — Medication 3 MILLILITER(S): at 05:14

## 2020-08-23 RX ADMIN — INSULIN GLARGINE 20 UNIT(S): 100 INJECTION, SOLUTION SUBCUTANEOUS at 22:44

## 2020-08-23 RX ADMIN — TRAMADOL HYDROCHLORIDE 25 MILLIGRAM(S): 50 TABLET ORAL at 06:02

## 2020-08-23 RX ADMIN — TRAMADOL HYDROCHLORIDE 25 MILLIGRAM(S): 50 TABLET ORAL at 12:07

## 2020-08-23 RX ADMIN — Medication 1 TABLET(S): at 11:56

## 2020-08-23 RX ADMIN — BUDESONIDE AND FORMOTEROL FUMARATE DIHYDRATE 2 PUFF(S): 160; 4.5 AEROSOL RESPIRATORY (INHALATION) at 18:27

## 2020-08-23 RX ADMIN — APIXABAN 5 MILLIGRAM(S): 2.5 TABLET, FILM COATED ORAL at 05:16

## 2020-08-23 RX ADMIN — ATORVASTATIN CALCIUM 80 MILLIGRAM(S): 80 TABLET, FILM COATED ORAL at 21:07

## 2020-08-23 RX ADMIN — Medication 180 MILLIGRAM(S): at 05:16

## 2020-08-23 RX ADMIN — TRAMADOL HYDROCHLORIDE 25 MILLIGRAM(S): 50 TABLET ORAL at 12:40

## 2020-08-23 NOTE — PROGRESS NOTE ADULT - ATTENDING COMMENTS
Discharge: pending acceptance to [facility] pulmonary rehab, if not accepted will need to go to another rehab center    Julia Vazquez DO  Pager #: [phone]

## 2020-08-23 NOTE — PROVIDER CONTACT NOTE (OTHER) - ACTION/TREATMENT ORDERED:
pending orders
pending orders
Humalog insulin 3 units sub-Q x1 now-hold premeal insulin for dinner-continue to monitor fsbs as ordered

## 2020-08-23 NOTE — PROGRESS NOTE ADULT - PROBLEM SELECTOR PLAN 2
Given Diamox with poor response. Restarted on IV Lasix, but developed worsening Cr and metabolic alkalosis, lasix held. Now on Bumex w improvement in LE edema. Worsening metabolic alkalosis - 1 dose of Diamox given after d/w pulm Dr Diane.     Patient has a primary respiratory acidosis with a secondary metabolic alkalosis.    Cont bumex 1mg qd, discussed with Dr. Brunson, will give another dose of 1mg IV bumex today to try to help with blistering swelling in her feet today, monitor creatinine closely

## 2020-08-23 NOTE — PROGRESS NOTE ADULT - SUBJECTIVE AND OBJECTIVE BOX
CARDIOLOGY FOLLOW UP - Dr. Brunson    CC no cp or increase sob. c/o leg pain / blister      PHYSICAL EXAM:  T(C): 36.6 (08-23-20 @ 10:26), Max: 36.8 (08-22-20 @ 17:23)  HR: 91 (08-23-20 @ 10:26) (79 - 101)  BP: 116/69 (08-23-20 @ 10:26) (116/69 - 154/80)  RR: 18 (08-23-20 @ 10:26) (18 - 20)  SpO2: 96% (08-23-20 @ 09:19) (95% - 100%)  Wt(kg): --  I&O's Summary    22 Aug 2020 07:01  -  23 Aug 2020 07:00  --------------------------------------------------------  IN: 420 mL / OUT: 1350 mL / NET: -930 mL    23 Aug 2020 07:01  -  23 Aug 2020 11:14  --------------------------------------------------------  IN: 0 mL / OUT: 650 mL / NET: -650 mL        Appearance: Normal	  Cardiovascular: Normal S1 S2,RRR, No JVD, No murmurs  Respiratory: diminished 	  Gastrointestinal:  Soft, Non-tender, + BS	  Extremities:  bl le edema ++ blister         MEDICATIONS  (STANDING):  albuterol/ipratropium for Nebulization 3 milliLiter(s) Nebulizer every 6 hours  apixaban 5 milliGRAM(s) Oral every 12 hours  aspirin enteric coated 81 milliGRAM(s) Oral daily  atorvastatin 80 milliGRAM(s) Oral at bedtime  azithromycin   Tablet 250 milliGRAM(s) Oral <User Schedule>  Biotene Dry Mouth Oral Rinse 5 milliLiter(s) Swish and Spit two times a day  bisacodyl Suppository 10 milliGRAM(s) Rectal daily  budesonide 160 MICROgram(s)/formoterol 4.5 MICROgram(s) Inhaler 2 Puff(s) Inhalation two times a day  buMETAnide 1 milliGRAM(s) Oral daily  chlorhexidine 2% Cloths 1 Application(s) Topical daily  cholecalciferol 2000 Unit(s) Oral daily  dextrose 5%. 1000 milliLiter(s) (50 mL/Hr) IV Continuous <Continuous>  dextrose 50% Injectable 25 Gram(s) IV Push once  diltiazem    milliGRAM(s) Oral daily  insulin glargine Injectable (LANTUS) 20 Unit(s) SubCutaneous at bedtime  insulin lispro (HumaLOG) corrective regimen sliding scale   SubCutaneous three times a day before meals  insulin lispro (HumaLOG) corrective regimen sliding scale   SubCutaneous at bedtime  insulin lispro Injectable (HumaLOG) 5 Unit(s) SubCutaneous three times a day with meals  lactulose Syrup 15 Gram(s) Oral two times a day  montelukast 10 milliGRAM(s) Oral daily  multivitamin 1 Tablet(s) Oral daily  pantoprazole    Tablet 40 milliGRAM(s) Oral before breakfast  polyethylene glycol 3350 17 Gram(s) Oral daily  senna 2 Tablet(s) Oral at bedtime  theophylline ER (24 Hour) 400 milliGRAM(s) Oral daily  tiotropium 18 MICROgram(s) Capsule 1 Capsule(s) Inhalation daily  traMADol 25 milliGRAM(s) Oral once      TELEMETRY: 	    ECG:  	  RADIOLOGY:   DIAGNOSTIC TESTING:  [ ] Echocardiogram:  [ ]  Catheterization:  [ ] Stress Test:    OTHER: 	    LABS:	 	                            7.6    7.00  )-----------( 184      ( 23 Aug 2020 06:36 )             26.4     08-23    138  |  94<L>  |  23  ----------------------------<  121<H>  3.9   |  35<H>  |  0.90    Ca    10.1      23 Aug 2020 06:36  Phos  4.3     08-23  Mg     2.0     08-23

## 2020-08-23 NOTE — PROGRESS NOTE ADULT - SUBJECTIVE AND OBJECTIVE BOX
[facility] Division of Hospital Medicine  Julia Vazquez DO  Pager (NADEEM-F, 3Y-1Q): [phone]  Other Times:  [phone]    Patient is a 60y old  Female who presents with a chief complaint of 60F p/w generalized weakness and difficulty walking (23 Aug 2020 11:14)      SUBJECTIVE / OVERNIGHT EVENTS: No overnight events. Patient complained of pain in both of her feet this morning. She has been seen by podiatry during this admission and has been getting daily dressing changes but the pain is worse today. Her breathing is still at baseline. No chest pain.   ADDITIONAL REVIEW OF SYSTEMS: negative    MEDICATIONS  (STANDING):  albuterol/ipratropium for Nebulization 3 milliLiter(s) Nebulizer every 6 hours  apixaban 5 milliGRAM(s) Oral every 12 hours  aspirin enteric coated 81 milliGRAM(s) Oral daily  atorvastatin 80 milliGRAM(s) Oral at bedtime  azithromycin   Tablet 250 milliGRAM(s) Oral <User Schedule>  Biotene Dry Mouth Oral Rinse 5 milliLiter(s) Swish and Spit two times a day  bisacodyl Suppository 10 milliGRAM(s) Rectal daily  budesonide 160 MICROgram(s)/formoterol 4.5 MICROgram(s) Inhaler 2 Puff(s) Inhalation two times a day  buMETAnide 1 milliGRAM(s) Oral daily  buMETAnide Injectable 1 milliGRAM(s) IV Push once  chlorhexidine 2% Cloths 1 Application(s) Topical daily  cholecalciferol 2000 Unit(s) Oral daily  dextrose 5%. 1000 milliLiter(s) (50 mL/Hr) IV Continuous <Continuous>  dextrose 50% Injectable 25 Gram(s) IV Push once  diltiazem    milliGRAM(s) Oral daily  insulin glargine Injectable (LANTUS) 20 Unit(s) SubCutaneous at bedtime  insulin lispro (HumaLOG) corrective regimen sliding scale   SubCutaneous three times a day before meals  insulin lispro (HumaLOG) corrective regimen sliding scale   SubCutaneous at bedtime  insulin lispro Injectable (HumaLOG) 5 Unit(s) SubCutaneous three times a day with meals  lactulose Syrup 15 Gram(s) Oral two times a day  montelukast 10 milliGRAM(s) Oral daily  multivitamin 1 Tablet(s) Oral daily  pantoprazole    Tablet 40 milliGRAM(s) Oral before breakfast  polyethylene glycol 3350 17 Gram(s) Oral daily  senna 2 Tablet(s) Oral at bedtime  theophylline ER (24 Hour) 400 milliGRAM(s) Oral daily  tiotropium 18 MICROgram(s) Capsule 1 Capsule(s) Inhalation daily    MEDICATIONS  (PRN):  ALPRAZolam 0.25 milliGRAM(s) Oral two times a day PRN Anxiety  glucagon  Injectable 1 milliGRAM(s) IntraMuscular once PRN Glucose LESS THAN 70 milligrams/deciliter  guaiFENesin   Syrup  (Sugar-Free) 100 milliGRAM(s) Oral every 6 hours PRN Cough      CAPILLARY BLOOD GLUCOSE      POCT Blood Glucose.: 138 mg/dL (23 Aug 2020 09:49)  POCT Blood Glucose.: 183 mg/dL (22 Aug 2020 22:35)  POCT Blood Glucose.: 138 mg/dL (22 Aug 2020 19:53)  POCT Blood Glucose.: 112 mg/dL (22 Aug 2020 14:39)    I&O's Summary    22 Aug 2020 07:01  -  23 Aug 2020 07:00  --------------------------------------------------------  IN: 420 mL / OUT: 1350 mL / NET: -930 mL    23 Aug 2020 07:01  -  23 Aug 2020 12:12  --------------------------------------------------------  IN: 0 mL / OUT: 650 mL / NET: -650 mL        PHYSICAL EXAM:  Vital Signs Last 24 Hrs  T(C): 36.6 (23 Aug 2020 10:26), Max: 36.8 (22 Aug 2020 17:23)  T(F): 97.9 (23 Aug 2020 10:26), Max: 98.2 (22 Aug 2020 17:23)  HR: 91 (23 Aug 2020 10:26) (79 - 101)  BP: 116/69 (23 Aug 2020 10:26) (116/69 - 154/80)  BP(mean): --  RR: 18 (23 Aug 2020 10:26) (18 - 20)  SpO2: 96% (23 Aug 2020 09:19) (95% - 100%)  CONSTITUTIONAL: no respiratory distress  EYES: PERRLA; conjunctiva and sclera clear  ENMT: Moist oral mucosa, no pharyngeal injection or exudates; normal dentition  NECK: Supple, no palpable masses; no thyromegaly  RESPIRATORY: Normal respiratory effort; lungs are clear to auscultation bilaterally, no wheezes but decreased sounds overall  CARDIOVASCULAR: Regular rate and rhythm, normal S1 and S2, no murmur/rub/gallop; No lower extremity edema; Peripheral pulses are 3+ bilaterally, right foot has a blister with serous fluid  ABDOMEN: Nontender to palpation, normoactive bowel sounds, no rebound/guarding; No hepatosplenomegaly  MUSCULOSKELETAL: no clubbing or cyanosis of digits; no joint swelling  PSYCH: A+O to person, place, and time; no hallucinations  NEUROLOGY: CN 2-12 are intact and symmetric; no gross sensory deficits   SKIN: No rashes; no palpable lesions    LABS:                        7.6    7.00  )-----------( 184      ( 23 Aug 2020 06:36 )             26.4     08-23    138  |  94<L>  |  23  ----------------------------<  121<H>  3.9   |  35<H>  |  0.90    Ca    10.1      23 Aug 2020 06:36  Phos  4.3     08-23  Mg     2.0     08-23        COORDINATION OF CARE:  Care Discussed with Consultants/Other Providers [Y/N]: yes cardiology

## 2020-08-23 NOTE — PROGRESS NOTE ADULT - ATTENDING COMMENTS
Agree with above NP note.  cv stable  continued but improved leg pain, edema  continue bumex as ordered  diamox prn  await d/c planning to [facility]

## 2020-08-23 NOTE — PROGRESS NOTE ADULT - ASSESSMENT
60F w COPD on 3LNC (?pending transplant list at [facility]), former smoker, CAD s/p stent (2016), afib on eliquis, uncontrolled DM2, raynaud phenomenon and recent hospitalization at [facility] for altered mental status and AURORA aw COPD exacerbation, LE swelling, weakness.

## 2020-08-24 LAB
ANION GAP SERPL CALC-SCNC: 5 MMOL/L — SIGNIFICANT CHANGE UP (ref 5–17)
BUN SERPL-MCNC: 24 MG/DL — HIGH (ref 7–23)
CALCIUM SERPL-MCNC: 10.1 MG/DL — SIGNIFICANT CHANGE UP (ref 8.4–10.5)
CHLORIDE SERPL-SCNC: 95 MMOL/L — LOW (ref 96–108)
CO2 SERPL-SCNC: 39 MMOL/L — HIGH (ref 22–31)
CREAT SERPL-MCNC: 0.87 MG/DL — SIGNIFICANT CHANGE UP (ref 0.5–1.3)
GLUCOSE BLDC GLUCOMTR-MCNC: 110 MG/DL — HIGH (ref 70–99)
GLUCOSE BLDC GLUCOMTR-MCNC: 111 MG/DL — HIGH (ref 70–99)
GLUCOSE BLDC GLUCOMTR-MCNC: 140 MG/DL — HIGH (ref 70–99)
GLUCOSE BLDC GLUCOMTR-MCNC: 269 MG/DL — HIGH (ref 70–99)
GLUCOSE SERPL-MCNC: 99 MG/DL — SIGNIFICANT CHANGE UP (ref 70–99)
MAGNESIUM SERPL-MCNC: 2 MG/DL — SIGNIFICANT CHANGE UP (ref 1.6–2.6)
PHOSPHATE SERPL-MCNC: 4.4 MG/DL — SIGNIFICANT CHANGE UP (ref 2.5–4.5)
POTASSIUM SERPL-MCNC: 4.1 MMOL/L — SIGNIFICANT CHANGE UP (ref 3.5–5.3)
POTASSIUM SERPL-SCNC: 4.1 MMOL/L — SIGNIFICANT CHANGE UP (ref 3.5–5.3)
SODIUM SERPL-SCNC: 139 MMOL/L — SIGNIFICANT CHANGE UP (ref 135–145)

## 2020-08-24 PROCEDURE — 99233 SBSQ HOSP IP/OBS HIGH 50: CPT

## 2020-08-24 PROCEDURE — 99232 SBSQ HOSP IP/OBS MODERATE 35: CPT

## 2020-08-24 PROCEDURE — 99222 1ST HOSP IP/OBS MODERATE 55: CPT

## 2020-08-24 RX ORDER — BUMETANIDE 0.25 MG/ML
1 INJECTION INTRAMUSCULAR; INTRAVENOUS ONCE
Refills: 0 | Status: COMPLETED | OUTPATIENT
Start: 2020-08-24 | End: 2020-08-24

## 2020-08-24 RX ORDER — ACETAMINOPHEN 500 MG
1000 TABLET ORAL ONCE
Refills: 0 | Status: COMPLETED | OUTPATIENT
Start: 2020-08-24 | End: 2020-08-24

## 2020-08-24 RX ORDER — LANOLIN ALCOHOL/MO/W.PET/CERES
3 CREAM (GRAM) TOPICAL ONCE
Refills: 0 | Status: COMPLETED | OUTPATIENT
Start: 2020-08-24 | End: 2020-08-24

## 2020-08-24 RX ORDER — TRAMADOL HYDROCHLORIDE 50 MG/1
25 TABLET ORAL ONCE
Refills: 0 | Status: DISCONTINUED | OUTPATIENT
Start: 2020-08-24 | End: 2020-08-24

## 2020-08-24 RX ADMIN — INSULIN GLARGINE 20 UNIT(S): 100 INJECTION, SOLUTION SUBCUTANEOUS at 22:13

## 2020-08-24 RX ADMIN — Medication 1000 MILLIGRAM(S): at 23:15

## 2020-08-24 RX ADMIN — TRAMADOL HYDROCHLORIDE 25 MILLIGRAM(S): 50 TABLET ORAL at 12:35

## 2020-08-24 RX ADMIN — TIOTROPIUM BROMIDE 1 CAPSULE(S): 18 CAPSULE ORAL; RESPIRATORY (INHALATION) at 11:52

## 2020-08-24 RX ADMIN — APIXABAN 5 MILLIGRAM(S): 2.5 TABLET, FILM COATED ORAL at 18:23

## 2020-08-24 RX ADMIN — Medication 5 UNIT(S): at 19:25

## 2020-08-24 RX ADMIN — Medication 3 MILLILITER(S): at 00:00

## 2020-08-24 RX ADMIN — Medication 5 UNIT(S): at 13:30

## 2020-08-24 RX ADMIN — Medication 400 MILLIGRAM(S): at 01:30

## 2020-08-24 RX ADMIN — Medication 1000 MILLIGRAM(S): at 02:00

## 2020-08-24 RX ADMIN — Medication 3 MILLILITER(S): at 11:49

## 2020-08-24 RX ADMIN — MONTELUKAST 10 MILLIGRAM(S): 4 TABLET, CHEWABLE ORAL at 11:51

## 2020-08-24 RX ADMIN — CHLORHEXIDINE GLUCONATE 1 APPLICATION(S): 213 SOLUTION TOPICAL at 14:00

## 2020-08-24 RX ADMIN — Medication 3 MILLILITER(S): at 18:24

## 2020-08-24 RX ADMIN — PANTOPRAZOLE SODIUM 40 MILLIGRAM(S): 20 TABLET, DELAYED RELEASE ORAL at 06:34

## 2020-08-24 RX ADMIN — BUMETANIDE 1 MILLIGRAM(S): 0.25 INJECTION INTRAMUSCULAR; INTRAVENOUS at 18:23

## 2020-08-24 RX ADMIN — AZITHROMYCIN 250 MILLIGRAM(S): 500 TABLET, FILM COATED ORAL at 18:23

## 2020-08-24 RX ADMIN — Medication 1 TABLET(S): at 11:51

## 2020-08-24 RX ADMIN — Medication 2000 UNIT(S): at 11:50

## 2020-08-24 RX ADMIN — Medication 180 MILLIGRAM(S): at 06:34

## 2020-08-24 RX ADMIN — Medication 3 MILLILITER(S): at 06:33

## 2020-08-24 RX ADMIN — Medication 3 MILLIGRAM(S): at 01:30

## 2020-08-24 RX ADMIN — BUDESONIDE AND FORMOTEROL FUMARATE DIHYDRATE 2 PUFF(S): 160; 4.5 AEROSOL RESPIRATORY (INHALATION) at 06:32

## 2020-08-24 RX ADMIN — POLYETHYLENE GLYCOL 3350 17 GRAM(S): 17 POWDER, FOR SOLUTION ORAL at 12:14

## 2020-08-24 RX ADMIN — Medication 5 MILLILITER(S): at 18:24

## 2020-08-24 RX ADMIN — Medication 5 UNIT(S): at 08:35

## 2020-08-24 RX ADMIN — Medication 0.25 MILLIGRAM(S): at 22:33

## 2020-08-24 RX ADMIN — Medication 400 MILLIGRAM(S): at 22:34

## 2020-08-24 RX ADMIN — BUDESONIDE AND FORMOTEROL FUMARATE DIHYDRATE 2 PUFF(S): 160; 4.5 AEROSOL RESPIRATORY (INHALATION) at 18:24

## 2020-08-24 RX ADMIN — SENNA PLUS 2 TABLET(S): 8.6 TABLET ORAL at 22:13

## 2020-08-24 RX ADMIN — TRAMADOL HYDROCHLORIDE 25 MILLIGRAM(S): 50 TABLET ORAL at 11:50

## 2020-08-24 RX ADMIN — Medication 81 MILLIGRAM(S): at 11:49

## 2020-08-24 RX ADMIN — ATORVASTATIN CALCIUM 80 MILLIGRAM(S): 80 TABLET, FILM COATED ORAL at 22:13

## 2020-08-24 RX ADMIN — Medication 400 MILLIGRAM(S): at 11:50

## 2020-08-24 RX ADMIN — BUMETANIDE 1 MILLIGRAM(S): 0.25 INJECTION INTRAMUSCULAR; INTRAVENOUS at 06:34

## 2020-08-24 RX ADMIN — APIXABAN 5 MILLIGRAM(S): 2.5 TABLET, FILM COATED ORAL at 06:34

## 2020-08-24 NOTE — PROGRESS NOTE ADULT - PROBLEM SELECTOR PLAN 2
Given Diamox with poor response. Restarted on IV Lasix, but developed worsening Cr and metabolic alkalosis, lasix held. Now on Bumex w improvement in LE edema. Worsening metabolic alkalosis - trend blood gas to determine dosing    Patient has a primary respiratory acidosis with a secondary metabolic alkalosis.    Cont bumex 1mg qd, - prn iv doses to try to help with blistering swelling in her feet today, monitor creatinine closely

## 2020-08-24 NOTE — CONSULT NOTE ADULT - SUBJECTIVE AND OBJECTIVE BOX
Patient is a 60y old  Female who presents with a chief complaint of 60F p/w generalized weakness and difficulty walking (24 Aug 2020 11:41)    Admission HPI:  60F w/ end stage COPD on 3LNC (pending transplant list at [facility]), former smoker, CAD s/p stent (2016), afib on eliquis, uncontrolled DM2, raynaud phenomenon and recent hospitalization at [facility] for altered mental status and ARTURO presents to [facility] for evaluation of generalized weakness and difficulty walking. Patient reports that she was recently hospitalized at [facility] from 7/25-7/31 for confusion and ARTURO. At that time, she was told by her brother (lives downstairs in home) that she was not herself and did not recognize him which is why she was sent to hospital. Per chart, Dr. Mathew (PCP) has chart notes regarding possible hallucinations. While at [facility] she had head CT reported to be normal and she declined MR brain. Her Arturo (Cr increased to 1.5) was reported to resolve with IV fluid. She was discharged to home 1d prior to presentation to [facility] and reports since discharge she has had generalized weakness, inability to walk due to leg heaviness/weakness L>R, and sensation that she has retained fluid in her legs. At baseline she walks with a walker and per chart review her PCP was concerned this month that the patient was developing steroid-induced myopathy and planned on having patient evaluated by neurology as well as obtain MRI at [facility]. No reported fall, head trauma, seizure, paralysis, loss of sensation in extremities or saddle anesthesia, fever, chills, cp, cough, worsening baseline sob (uses 3LNC), n/v/d, inability to urinate or urinary incontinence, constipation or bowel incontinence, or rash (has bruised on arm form needle stick from recent hospitalization).    DENIES allergy to albuterol- never rash, no anaphylaxis per patient  In the ED, VS 97.5, 138/76, 69, 18 98%3LNC (01 Aug 2020 22:05)    Interval History:  Patient has been evaluated by neurology- diagnosed with myopathy.  Course has been complicated by persistent COPD and ARTURO.  Has persistent weakness and dyspnea.     REVIEW OF SYSTEMS: No chest pain, shortness of breath, nausea, vomiting or diarhea; other ROS neg     PAST MEDICAL & SURGICAL HISTORY  Atrial fibrillation  Hyperlipemia  Chronic sinusitis  Raynaud phenomenon  HTN (hypertension)  DM (diabetes mellitus)  Claustrophobia  COPD (chronic obstructive pulmonary disease)  S/P tonsillectomy    FUNCTIONAL HISTORY:   Lives with brother in private home. No MARY and 6 steps inside.  PTA Independent    CURRENT FUNCTIONAL STATUS:  CG with transfers and gait.    FAMILY HISTORY   FH: MI (myocardial infarction)  FH: CABG (coronary artery bypass surgery)    RECENT LABS/IMAGING  CBC Full  -  ( 23 Aug 2020 06:36 )  WBC Count : 7.00 K/uL  RBC Count : 3.10 M/uL  Hemoglobin : 7.6 g/dL  Hematocrit : 26.4 %  Platelet Count - Automated : 184 K/uL  Mean Cell Volume : 85.2 fl  Mean Cell Hemoglobin : 24.5 pg  Mean Cell Hemoglobin Concentration : 28.8 gm/dL  Auto Neutrophil # : x  Auto Lymphocyte # : x  Auto Monocyte # : x  Auto Eosinophil # : x  Auto Basophil # : x  Auto Neutrophil % : x  Auto Lymphocyte % : x  Auto Monocyte % : x  Auto Eosinophil % : x  Auto Basophil % : x    08-24    139  |  95<L>  |  24<H>  ----------------------------<  99  4.1   |  39<H>  |  0.87    Ca    10.1      24 Aug 2020 09:00  Phos  4.4     08-24  Mg     2.0     08-24    VITALS  T(C): 36.1 (08-24-20 @ 05:56), Max: 36.7 (08-23-20 @ 23:03)  HR: 82 (08-24-20 @ 10:00) (70 - 89)  BP: 131/75 (08-24-20 @ 05:56) (131/75 - 154/84)  RR: 18 (08-24-20 @ 05:56) (18 - 18)  SpO2: 98% (08-24-20 @ 10:00) (97% - 100%)  Wt(kg): --    ALLERGIES  albuterol (Unknown)  No Known Allergies      MEDICATIONS   albuterol/ipratropium for Nebulization 3 milliLiter(s) Nebulizer every 6 hours  ALPRAZolam 0.25 milliGRAM(s) Oral two times a day PRN  apixaban 5 milliGRAM(s) Oral every 12 hours  aspirin enteric coated 81 milliGRAM(s) Oral daily  atorvastatin 80 milliGRAM(s) Oral at bedtime  azithromycin   Tablet 250 milliGRAM(s) Oral <User Schedule>  Biotene Dry Mouth Oral Rinse 5 milliLiter(s) Swish and Spit two times a day  bisacodyl Suppository 10 milliGRAM(s) Rectal daily  budesonide 160 MICROgram(s)/formoterol 4.5 MICROgram(s) Inhaler 2 Puff(s) Inhalation two times a day  buMETAnide 1 milliGRAM(s) Oral daily  chlorhexidine 2% Cloths 1 Application(s) Topical daily  cholecalciferol 2000 Unit(s) Oral daily  dextrose 5%. 1000 milliLiter(s) IV Continuous <Continuous>  dextrose 50% Injectable 25 Gram(s) IV Push once  diltiazem    milliGRAM(s) Oral daily  glucagon  Injectable 1 milliGRAM(s) IntraMuscular once PRN  guaiFENesin   Syrup  (Sugar-Free) 100 milliGRAM(s) Oral every 6 hours PRN  insulin glargine Injectable (LANTUS) 20 Unit(s) SubCutaneous at bedtime  insulin lispro (HumaLOG) corrective regimen sliding scale   SubCutaneous three times a day before meals  insulin lispro (HumaLOG) corrective regimen sliding scale   SubCutaneous at bedtime  insulin lispro Injectable (HumaLOG) 5 Unit(s) SubCutaneous three times a day with meals  lactulose Syrup 15 Gram(s) Oral two times a day  montelukast 10 milliGRAM(s) Oral daily  multivitamin 1 Tablet(s) Oral daily  pantoprazole    Tablet 40 milliGRAM(s) Oral before breakfast  polyethylene glycol 3350 17 Gram(s) Oral daily  senna 2 Tablet(s) Oral at bedtime  theophylline ER (24 Hour) 400 milliGRAM(s) Oral daily  tiotropium 18 MICROgram(s) Capsule 1 Capsule(s) Inhalation daily      ----------------------------------------------------------------------------------------  PHYSICAL EXAM  Constitutional - NAD, Comfortable  HEENT - NCAT, EOMI  Neck - Supple, No limited ROM  Chest - CTA bilaterally, No wheeze, No rhonchi, No crackles  Cardiovascular - RRR, S1S2, No murmurs  Abdomen - BS+, Soft, NTND  Extremities - No C/C/E, No calf tenderness   Neurologic Exam -                    Cognitive - Awake, Alert, AAO to self, place, date, year, situation     Communication - Fluent, No dysarthria, no aphasia     Cranial Nerves - CN 2-12 intact     Motor - No focal deficits      Sensory - Intact to LT     Reflexes - DTR Intact, No primitive reflexive       Psychiatric - Mood stable, Affect WNL    Impression:  59 yo with functional deficits secondary to diagnosis of myopathy    Plan:  PT- ROM, Bed Mob, Transfers, Amb w AD and bracing as needed  OT- ADLs, bracing  SLP- Dysphagia eval and treat  Prec- Falls, Cardiac, Pulm  DVT Prophylaxis- On Apixaban  Skin- Turn q2 h  Dispo- Patient is a 60y old  Female who presents with a chief complaint of 60F p/w generalized weakness and difficulty walking (24 Aug 2020 11:41)    Admission HPI:  60F w/ end stage COPD on 3LNC (pending transplant list at [facility]), former smoker, CAD s/p stent (2016), afib on eliquis, uncontrolled DM2, raynaud phenomenon and recent hospitalization at [facility] for altered mental status and ARTURO presents to [facility] for evaluation of generalized weakness and difficulty walking. Patient reports that she was recently hospitalized at [facility] from 7/25-7/31 for confusion and ARTURO. At that time, she was told by her brother (lives downstairs in home) that she was not herself and did not recognize him which is why she was sent to hospital. Per chart, Dr. Mathew (PCP) has chart notes regarding possible hallucinations. While at [facility] she had head CT reported to be normal and she declined MR brain. Her Arturo (Cr increased to 1.5) was reported to resolve with IV fluid. She was discharged to home 1d prior to presentation to [facility] and reports since discharge she has had generalized weakness, inability to walk due to leg heaviness/weakness L>R, and sensation that she has retained fluid in her legs. At baseline she walks with a walker and per chart review her PCP was concerned this month that the patient was developing steroid-induced myopathy and planned on having patient evaluated by neurology as well as obtain MRI at [facility]. No reported fall, head trauma, seizure, paralysis, loss of sensation in extremities or saddle anesthesia, fever, chills, cp, cough, worsening baseline sob (uses 3LNC), n/v/d, inability to urinate or urinary incontinence, constipation or bowel incontinence, or rash (has bruised on arm form needle stick from recent hospitalization).    DENIES allergy to albuterol- never rash, no anaphylaxis per patient  In the ED, VS 97.5, 138/76, 69, 18 98%3LNC (01 Aug 2020 22:05)    Interval History:  Patient has been evaluated by neurology- diagnosed with myopathy.  Course has been complicated by persistent COPD and ARTURO.  Has persistent weakness and dyspnea.     REVIEW OF SYSTEMS: + Dyspnea, + weakness, No chest pain, nausea, vomiting or diarhea; other ROS neg     PAST MEDICAL & SURGICAL HISTORY  Atrial fibrillation  Hyperlipemia  Chronic sinusitis  Raynaud phenomenon  HTN (hypertension)  DM (diabetes mellitus)  Claustrophobia  COPD (chronic obstructive pulmonary disease)  S/P tonsillectomy    FUNCTIONAL HISTORY:   Lives with brother in private home. No MARY and 6 steps inside.  PTA Independent    CURRENT FUNCTIONAL STATUS:  CG with transfers and gait.    FAMILY HISTORY   FH: MI (myocardial infarction)  FH: CABG (coronary artery bypass surgery)    RECENT LABS/IMAGING  CBC Full  -  ( 23 Aug 2020 06:36 )  WBC Count : 7.00 K/uL  RBC Count : 3.10 M/uL  Hemoglobin : 7.6 g/dL  Hematocrit : 26.4 %  Platelet Count - Automated : 184 K/uL  Mean Cell Volume : 85.2 fl  Mean Cell Hemoglobin : 24.5 pg  Mean Cell Hemoglobin Concentration : 28.8 gm/dL  Auto Neutrophil # : x  Auto Lymphocyte # : x  Auto Monocyte # : x  Auto Eosinophil # : x  Auto Basophil # : x  Auto Neutrophil % : x  Auto Lymphocyte % : x  Auto Monocyte % : x  Auto Eosinophil % : x  Auto Basophil % : x    08-24    139  |  95<L>  |  24<H>  ----------------------------<  99  4.1   |  39<H>  |  0.87    Ca    10.1      24 Aug 2020 09:00  Phos  4.4     08-24  Mg     2.0     08-24    VITALS  T(C): 36.1 (08-24-20 @ 05:56), Max: 36.7 (08-23-20 @ 23:03)  HR: 82 (08-24-20 @ 10:00) (70 - 89)  BP: 131/75 (08-24-20 @ 05:56) (131/75 - 154/84)  RR: 18 (08-24-20 @ 05:56) (18 - 18)  SpO2: 98% (08-24-20 @ 10:00) (97% - 100%)  Wt(kg): --    ALLERGIES  albuterol (Unknown)  No Known Allergies      MEDICATIONS   albuterol/ipratropium for Nebulization 3 milliLiter(s) Nebulizer every 6 hours  ALPRAZolam 0.25 milliGRAM(s) Oral two times a day PRN  apixaban 5 milliGRAM(s) Oral every 12 hours  aspirin enteric coated 81 milliGRAM(s) Oral daily  atorvastatin 80 milliGRAM(s) Oral at bedtime  azithromycin   Tablet 250 milliGRAM(s) Oral <User Schedule>  Biotene Dry Mouth Oral Rinse 5 milliLiter(s) Swish and Spit two times a day  bisacodyl Suppository 10 milliGRAM(s) Rectal daily  budesonide 160 MICROgram(s)/formoterol 4.5 MICROgram(s) Inhaler 2 Puff(s) Inhalation two times a day  buMETAnide 1 milliGRAM(s) Oral daily  chlorhexidine 2% Cloths 1 Application(s) Topical daily  cholecalciferol 2000 Unit(s) Oral daily  dextrose 5%. 1000 milliLiter(s) IV Continuous <Continuous>  dextrose 50% Injectable 25 Gram(s) IV Push once  diltiazem    milliGRAM(s) Oral daily  glucagon  Injectable 1 milliGRAM(s) IntraMuscular once PRN  guaiFENesin   Syrup  (Sugar-Free) 100 milliGRAM(s) Oral every 6 hours PRN  insulin glargine Injectable (LANTUS) 20 Unit(s) SubCutaneous at bedtime  insulin lispro (HumaLOG) corrective regimen sliding scale   SubCutaneous three times a day before meals  insulin lispro (HumaLOG) corrective regimen sliding scale   SubCutaneous at bedtime  insulin lispro Injectable (HumaLOG) 5 Unit(s) SubCutaneous three times a day with meals  lactulose Syrup 15 Gram(s) Oral two times a day  montelukast 10 milliGRAM(s) Oral daily  multivitamin 1 Tablet(s) Oral daily  pantoprazole    Tablet 40 milliGRAM(s) Oral before breakfast  polyethylene glycol 3350 17 Gram(s) Oral daily  senna 2 Tablet(s) Oral at bedtime  theophylline ER (24 Hour) 400 milliGRAM(s) Oral daily  tiotropium 18 MICROgram(s) Capsule 1 Capsule(s) Inhalation daily      ----------------------------------------------------------------------------------------  PHYSICAL EXAM  Constitutional - On O2, irritable.  HEENT - NCAT, EOMI  Neck - Supple, No limited ROM  Chest - Decreased breath sounds bibasilar.  Cardiovascular - RRR, S1S2, No murmurs  Abdomen - BS+, Soft, NTND  Extremities - + edema   Neurologic Exam -                 AAO x 3  Motor- 3/5 bl LEs, 4/5 bl UEs       Psychiatric - Anxious, irritable during exam.     Impression:  61 yo with functional deficits secondary to diagnosis of myopathy    Plan:  PT- ROM, Bed Mob, Transfers, Amb w AD and bracing as needed  OT- ADLs, bracing  Prec- Falls, Cardiac, Pulm  DVT Prophylaxis- On Apixaban  Skin- Turn q2 h  Dispo- Acute Rehab- can tolerate 3h/d PT/OT/SLP and requires daily physician visits

## 2020-08-24 NOTE — PROGRESS NOTE ADULT - ASSESSMENT
61 y/o F w/chronic respiratory failure w/hypoxia and hypercapnia secondary to combination of HFpEF and COPD on home O2 admitted for acute on chronic respiratory failure with hypoxia and hypercapnia likely secondary to COPD exacerbation and acute pulmonary edema secondary to acute on chronic HFpEF.     - Continue diuresis goal net even to net negative  - Continue supplemental O2  - Patient likely does not have metabolic alkalosis, elevated serum bicarb is likely due to compensation for chronic hypercapnic respiratory failure. Please check ABG prior to giving further doses of diamox to ensure patient actually has alkalosis.    - Continue bronchodilators, theophyline 59 y/o F w/chronic respiratory failure w/hypoxia and hypercapnia secondary to combination of HFpEF and COPD on home O2 admitted for acute on chronic respiratory failure with hypoxia and hypercapnia likely secondary to COPD exacerbation and acute pulmonary edema secondary to acute on chronic HFpEF.     - Please check ABG to determine acid/base status. Patient may not have alkalosis as elevated serum bicarb is likely due to compensation for chronic hypercapnic respiratory failure.  - Diuresis goal net negative 1-2L, would increase bumex dose and frequency to achieve desired output and may require additional dose of diamox pending ABG resutls  - Continue supplemental O2  - Continue bronchodilators, theophyline   - Pain control for lower extremity edema and blisters

## 2020-08-24 NOTE — PROGRESS NOTE ADULT - SUBJECTIVE AND OBJECTIVE BOX
CHIEF COMPLAINT:    Interval Events:    REVIEW OF SYSTEMS:  See above. ROS otherwise negative    OBJECTIVE:  ICU Vital Signs Last 24 Hrs  T(C): 36.1 (24 Aug 2020 05:56), Max: 36.7 (23 Aug 2020 23:03)  T(F): 97 (24 Aug 2020 05:56), Max: 98 (23 Aug 2020 23:03)  HR: 82 (24 Aug 2020 10:00) (70 - 91)  BP: 131/75 (24 Aug 2020 05:56) (116/69 - 154/84)  BP(mean): --  ABP: --  ABP(mean): --  RR: 18 (24 Aug 2020 05:56) (18 - 18)  SpO2: 98% (24 Aug 2020 10:00) (97% - 100%)        08-23 @ 07:01  -  08-24 @ 07:00  --------------------------------------------------------  IN: 240 mL / OUT: 1050 mL / NET: -810 mL      CAPILLARY BLOOD GLUCOSE      POCT Blood Glucose.: 111 mg/dL (24 Aug 2020 08:27)      PHYSICAL EXAM:  General:   HEENT:   Lymph Nodes:  Neck:   Respiratory:   Cardiovascular:   Abdomen:   Extremities:   Skin:   Neurological:  Psychiatry:    HOSPITAL MEDICATIONS:  MEDICATIONS  (STANDING):  albuterol/ipratropium for Nebulization 3 milliLiter(s) Nebulizer every 6 hours  apixaban 5 milliGRAM(s) Oral every 12 hours  aspirin enteric coated 81 milliGRAM(s) Oral daily  atorvastatin 80 milliGRAM(s) Oral at bedtime  azithromycin   Tablet 250 milliGRAM(s) Oral <User Schedule>  Biotene Dry Mouth Oral Rinse 5 milliLiter(s) Swish and Spit two times a day  bisacodyl Suppository 10 milliGRAM(s) Rectal daily  budesonide 160 MICROgram(s)/formoterol 4.5 MICROgram(s) Inhaler 2 Puff(s) Inhalation two times a day  buMETAnide 1 milliGRAM(s) Oral daily  chlorhexidine 2% Cloths 1 Application(s) Topical daily  cholecalciferol 2000 Unit(s) Oral daily  dextrose 5%. 1000 milliLiter(s) (50 mL/Hr) IV Continuous <Continuous>  dextrose 50% Injectable 25 Gram(s) IV Push once  diltiazem    milliGRAM(s) Oral daily  insulin glargine Injectable (LANTUS) 20 Unit(s) SubCutaneous at bedtime  insulin lispro (HumaLOG) corrective regimen sliding scale   SubCutaneous three times a day before meals  insulin lispro (HumaLOG) corrective regimen sliding scale   SubCutaneous at bedtime  insulin lispro Injectable (HumaLOG) 5 Unit(s) SubCutaneous three times a day with meals  lactulose Syrup 15 Gram(s) Oral two times a day  montelukast 10 milliGRAM(s) Oral daily  multivitamin 1 Tablet(s) Oral daily  pantoprazole    Tablet 40 milliGRAM(s) Oral before breakfast  polyethylene glycol 3350 17 Gram(s) Oral daily  senna 2 Tablet(s) Oral at bedtime  theophylline ER (24 Hour) 400 milliGRAM(s) Oral daily  tiotropium 18 MICROgram(s) Capsule 1 Capsule(s) Inhalation daily    MEDICATIONS  (PRN):  ALPRAZolam 0.25 milliGRAM(s) Oral two times a day PRN Anxiety  glucagon  Injectable 1 milliGRAM(s) IntraMuscular once PRN Glucose LESS THAN 70 milligrams/deciliter  guaiFENesin   Syrup  (Sugar-Free) 100 milliGRAM(s) Oral every 6 hours PRN Cough      LABS:                        7.6    7.00  )-----------( 184      ( 23 Aug 2020 06:36 )             26.4     Hgb Trend: 7.6<--, 7.6<--, 7.8<--, 7.9<--, 8.1<--  08-24    139  |  95<L>  |  24<H>  ----------------------------<  99  4.1   |  39<H>  |  0.87    Ca    10.1      24 Aug 2020 09:00  Phos  4.4     08-24  Mg     2.0     08-24      Creatinine Trend: 0.87<--, 0.90<--, 0.89<--, 0.82<--, 1.06<--, 1.07<--            MICROBIOLOGY:       RADIOLOGY:  [x ] Reviewed and interpreted by me CHIEF COMPLAINT: Leg pain and dyspnea.    Interval Events: No acute events. Reports continued difficulty breathing. Some improvement with BiPAP. No improvement in breathing since arrival in the hospital. Reports edema in legs is unchanged and continues to have severe pain and blisters in both lower extremities. No chest pain, cough, fevers, chills, nausea, emesis, or diarrhea.      REVIEW OF SYSTEMS:  See above. ROS otherwise negative    OBJECTIVE:  ICU Vital Signs Last 24 Hrs  T(C): 36.1 (24 Aug 2020 05:56), Max: 36.7 (23 Aug 2020 23:03)  T(F): 97 (24 Aug 2020 05:56), Max: 98 (23 Aug 2020 23:03)  HR: 82 (24 Aug 2020 10:00) (70 - 91)  BP: 131/75 (24 Aug 2020 05:56) (116/69 - 154/84)  BP(mean): --  ABP: --  ABP(mean): --  RR: 18 (24 Aug 2020 05:56) (18 - 18)  SpO2: 98% (24 Aug 2020 10:00) (97% - 100%)        08-23 @ 07:01  -  08-24 @ 07:00  --------------------------------------------------------  IN: 240 mL / OUT: 1050 mL / NET: -810 mL      CAPILLARY BLOOD GLUCOSE      POCT Blood Glucose.: 111 mg/dL (24 Aug 2020 08:27)      PHYSICAL EXAM:  General: Morbidly obese F sitting in bed, NAD, uncomfortable  HEENT: NC/AT sclerae anicteric  Respiratory: Mildly increased WOB, + accessory muscle use. Diminished breath sounds b/l   Cardiovascular: S1, S2  Abdomen: Soft, + BS  Extremities: WWP, tender to palpation, 4+ pitting edema  Neurological: Awake, alert, follows commands  Psychiatry: Frustrated, but appropriate    HOSPITAL MEDICATIONS:  MEDICATIONS  (STANDING):  albuterol/ipratropium for Nebulization 3 milliLiter(s) Nebulizer every 6 hours  apixaban 5 milliGRAM(s) Oral every 12 hours  aspirin enteric coated 81 milliGRAM(s) Oral daily  atorvastatin 80 milliGRAM(s) Oral at bedtime  azithromycin   Tablet 250 milliGRAM(s) Oral <User Schedule>  Biotene Dry Mouth Oral Rinse 5 milliLiter(s) Swish and Spit two times a day  bisacodyl Suppository 10 milliGRAM(s) Rectal daily  budesonide 160 MICROgram(s)/formoterol 4.5 MICROgram(s) Inhaler 2 Puff(s) Inhalation two times a day  buMETAnide 1 milliGRAM(s) Oral daily  chlorhexidine 2% Cloths 1 Application(s) Topical daily  cholecalciferol 2000 Unit(s) Oral daily  dextrose 5%. 1000 milliLiter(s) (50 mL/Hr) IV Continuous <Continuous>  dextrose 50% Injectable 25 Gram(s) IV Push once  diltiazem    milliGRAM(s) Oral daily  insulin glargine Injectable (LANTUS) 20 Unit(s) SubCutaneous at bedtime  insulin lispro (HumaLOG) corrective regimen sliding scale   SubCutaneous three times a day before meals  insulin lispro (HumaLOG) corrective regimen sliding scale   SubCutaneous at bedtime  insulin lispro Injectable (HumaLOG) 5 Unit(s) SubCutaneous three times a day with meals  lactulose Syrup 15 Gram(s) Oral two times a day  montelukast 10 milliGRAM(s) Oral daily  multivitamin 1 Tablet(s) Oral daily  pantoprazole    Tablet 40 milliGRAM(s) Oral before breakfast  polyethylene glycol 3350 17 Gram(s) Oral daily  senna 2 Tablet(s) Oral at bedtime  theophylline ER (24 Hour) 400 milliGRAM(s) Oral daily  tiotropium 18 MICROgram(s) Capsule 1 Capsule(s) Inhalation daily    MEDICATIONS  (PRN):  ALPRAZolam 0.25 milliGRAM(s) Oral two times a day PRN Anxiety  glucagon  Injectable 1 milliGRAM(s) IntraMuscular once PRN Glucose LESS THAN 70 milligrams/deciliter  guaiFENesin   Syrup  (Sugar-Free) 100 milliGRAM(s) Oral every 6 hours PRN Cough      LABS:                        7.6    7.00  )-----------( 184      ( 23 Aug 2020 06:36 )             26.4     Hgb Trend: 7.6<--, 7.6<--, 7.8<--, 7.9<--, 8.1<--  08-24    139  |  95<L>  |  24<H>  ----------------------------<  99  4.1   |  39<H>  |  0.87    Ca    10.1      24 Aug 2020 09:00  Phos  4.4     08-24  Mg     2.0     08-24      Creatinine Trend: 0.87<--, 0.90<--, 0.89<--, 0.82<--, 1.06<--, 1.07<--            MICROBIOLOGY:       RADIOLOGY:  [x ] Reviewed and interpreted by me

## 2020-08-24 NOTE — PROGRESS NOTE ADULT - ASSESSMENT
60F w COPD on 3LNC (?pending transplant list at [facility]), former smoker, CAD s/p stent (2016), afib on eliquis, uncontrolled DM2, raynaud phenomenon and recent hospitalization at [facility] for altered mental status and [facility] aw COPD exacerbation, LE swelling, weakness.

## 2020-08-24 NOTE — PROGRESS NOTE ADULT - PROBLEM SELECTOR PLAN 1
Dyspnea multifactorial in the setting of underlying lung disease, cardiovascular dysfunction, obesity, and deconditioning.     was given prolonged course of Prednisone, now tapered off. Overnight and prn Bipap. Pt comfortable off Bipap and able to speak in full sentences.   - cont britney, karen, spiriva, symbicort, singulair    Pt now agreeable to see house pulm again, following.    Continue supplemental O2 with goal O2 sat > 88% - she does not need to be at 100%.

## 2020-08-24 NOTE — PROGRESS NOTE ADULT - SUBJECTIVE AND OBJECTIVE BOX
CARDIOLOGY FOLLOW UP - Dr. Brunson    CC no acute complaints, resting comfortably       PHYSICAL EXAM:  T(C): 36.1 (08-24-20 @ 05:56), Max: 36.7 (08-23-20 @ 23:03)  HR: 82 (08-24-20 @ 10:00) (70 - 89)  BP: 131/75 (08-24-20 @ 05:56) (131/75 - 154/84)  RR: 18 (08-24-20 @ 05:56) (18 - 18)  SpO2: 98% (08-24-20 @ 10:00) (97% - 100%)  Wt(kg): --  I&O's Summary    23 Aug 2020 07:01  -  24 Aug 2020 07:00  --------------------------------------------------------  IN: 240 mL / OUT: 1050 mL / NET: -810 mL      Appearance: Normal	  Cardiovascular: Normal S1 S2,RRR, No JVD, No murmurs  Respiratory: diminished 	  Gastrointestinal:  Soft, Non-tender, + BS	  Extremities:  bl le edema ++ blister       MEDICATIONS  (STANDING):  albuterol/ipratropium for Nebulization 3 milliLiter(s) Nebulizer every 6 hours  apixaban 5 milliGRAM(s) Oral every 12 hours  aspirin enteric coated 81 milliGRAM(s) Oral daily  atorvastatin 80 milliGRAM(s) Oral at bedtime  azithromycin   Tablet 250 milliGRAM(s) Oral <User Schedule>  Biotene Dry Mouth Oral Rinse 5 milliLiter(s) Swish and Spit two times a day  bisacodyl Suppository 10 milliGRAM(s) Rectal daily  budesonide 160 MICROgram(s)/formoterol 4.5 MICROgram(s) Inhaler 2 Puff(s) Inhalation two times a day  buMETAnide 1 milliGRAM(s) Oral daily  chlorhexidine 2% Cloths 1 Application(s) Topical daily  cholecalciferol 2000 Unit(s) Oral daily  dextrose 5%. 1000 milliLiter(s) (50 mL/Hr) IV Continuous <Continuous>  dextrose 50% Injectable 25 Gram(s) IV Push once  diltiazem    milliGRAM(s) Oral daily  insulin glargine Injectable (LANTUS) 20 Unit(s) SubCutaneous at bedtime  insulin lispro (HumaLOG) corrective regimen sliding scale   SubCutaneous three times a day before meals  insulin lispro (HumaLOG) corrective regimen sliding scale   SubCutaneous at bedtime  insulin lispro Injectable (HumaLOG) 5 Unit(s) SubCutaneous three times a day with meals  lactulose Syrup 15 Gram(s) Oral two times a day  montelukast 10 milliGRAM(s) Oral daily  multivitamin 1 Tablet(s) Oral daily  pantoprazole    Tablet 40 milliGRAM(s) Oral before breakfast  polyethylene glycol 3350 17 Gram(s) Oral daily  senna 2 Tablet(s) Oral at bedtime  theophylline ER (24 Hour) 400 milliGRAM(s) Oral daily  tiotropium 18 MICROgram(s) Capsule 1 Capsule(s) Inhalation daily      TELEMETRY: 	    ECG:  	  RADIOLOGY:   DIAGNOSTIC TESTING:  [ ] Echocardiogram:  [ ]  Catheterization:  [ ] Stress Test:    OTHER: 	    LABS:	 	                                7.6    7.00  )-----------( 184      ( 23 Aug 2020 06:36 )             26.4     08-24    139  |  95<L>  |  24<H>  ----------------------------<  99  4.1   |  39<H>  |  0.87    Ca    10.1      24 Aug 2020 09:00  Phos  4.4     08-24  Mg     2.0     08-24

## 2020-08-24 NOTE — PROGRESS NOTE ADULT - ASSESSMENT
EVAN 1/7/19: no ALINA thrombus, unable to assess LV sys fx  echo 12/7/18: EF 71%, nl LV sys fx, mild diastolic dysfx     a/p    60 year old female with hx of D CHF, HTN, CAD s/p PCI, Afib/ aflutter, s/p Aflutter Ablation 12/2019, COPD, DM2, Anxiety, , raynaud phenomenon presenting with weakness, SOB , hyperkalemia.      1. Dyspnea, Resp failure  -secondary to chronic lung disease, COPD, volume overload  -pulm f/u   -covid 19 negative  -no acs , cv stable   -ecg with known RBBB, new twi noted, hyperkalemia also noted on admission  -echo with normal LVEF, mild diastolic dysfunction   -s/p PO Prednisone  -on Duoneb, Symbicort, Spiriva and Theophylline.    2. CAD s/p PCI   -stable, no cp  -c/w ASA, statin  -echo noted above     3. Afib/aflutter s/p  Aflutter Ablation 12/2019  -in NSR, cw cardizem  mg daily  -CHADsVac=2, c/w eliquis     4. Acute on Chronic Diastolic CHF   -dyspnea mostly secondary to copd  -intermittently on bipap   -echo with normal lVEF   -Restarted on IV Lasix, but held for worsening Cr and metabolic alkalosis   -creat stable, LE edema improved,   -continue bumex 1mg daily, diamox prn  -closely monitor bicarb    no cv objection to dc planning to [facility] pul[facility] rehab   dvt ppx

## 2020-08-24 NOTE — PROGRESS NOTE ADULT - SUBJECTIVE AND OBJECTIVE BOX
[facility] Division of Hospital Medicine  Dileep Salomon MD  Pager (M-F, 8A-5P): [phone]  Other Times:  [phone]    Patient is a 60y old  Female who presents with a chief complaint of 60F p/w generalized weakness and difficulty walking (24 Aug 2020 10:41)    SUBJECTIVE / OVERNIGHT EVENTS: no new complaints, tolerating on NIV  ADDITIONAL REVIEW OF SYSTEMS:    MEDICATIONS  (STANDING):  albuterol/ipratropium for Nebulization 3 milliLiter(s) Nebulizer every 6 hours  apixaban 5 milliGRAM(s) Oral every 12 hours  aspirin enteric coated 81 milliGRAM(s) Oral daily  atorvastatin 80 milliGRAM(s) Oral at bedtime  azithromycin   Tablet 250 milliGRAM(s) Oral <User Schedule>  Biotene Dry Mouth Oral Rinse 5 milliLiter(s) Swish and Spit two times a day  bisacodyl Suppository 10 milliGRAM(s) Rectal daily  budesonide 160 MICROgram(s)/formoterol 4.5 MICROgram(s) Inhaler 2 Puff(s) Inhalation two times a day  buMETAnide 1 milliGRAM(s) Oral daily  chlorhexidine 2% Cloths 1 Application(s) Topical daily  cholecalciferol 2000 Unit(s) Oral daily  dextrose 5%. 1000 milliLiter(s) (50 mL/Hr) IV Continuous <Continuous>  dextrose 50% Injectable 25 Gram(s) IV Push once  diltiazem    milliGRAM(s) Oral daily  insulin glargine Injectable (LANTUS) 20 Unit(s) SubCutaneous at bedtime  insulin lispro (HumaLOG) corrective regimen sliding scale   SubCutaneous three times a day before meals  insulin lispro (HumaLOG) corrective regimen sliding scale   SubCutaneous at bedtime  insulin lispro Injectable (HumaLOG) 5 Unit(s) SubCutaneous three times a day with meals  lactulose Syrup 15 Gram(s) Oral two times a day  montelukast 10 milliGRAM(s) Oral daily  multivitamin 1 Tablet(s) Oral daily  pantoprazole    Tablet 40 milliGRAM(s) Oral before breakfast  polyethylene glycol 3350 17 Gram(s) Oral daily  senna 2 Tablet(s) Oral at bedtime  theophylline ER (24 Hour) 400 milliGRAM(s) Oral daily  tiotropium 18 MICROgram(s) Capsule 1 Capsule(s) Inhalation daily  traMADol 25 milliGRAM(s) Oral once    MEDICATIONS  (PRN):  ALPRAZolam 0.25 milliGRAM(s) Oral two times a day PRN Anxiety  glucagon  Injectable 1 milliGRAM(s) IntraMuscular once PRN Glucose LESS THAN 70 milligrams/deciliter  guaiFENesin   Syrup  (Sugar-Free) 100 milliGRAM(s) Oral every 6 hours PRN Cough      CAPILLARY BLOOD GLUCOSE      POCT Blood Glucose.: 111 mg/dL (24 Aug 2020 08:27)  POCT Blood Glucose.: 166 mg/dL (23 Aug 2020 22:37)  POCT Blood Glucose.: 121 mg/dL (23 Aug 2020 19:26)  POCT Blood Glucose.: 134 mg/dL (23 Aug 2020 12:50)    I&O's Summary    23 Aug 2020 07:01  -  24 Aug 2020 07:00  --------------------------------------------------------  IN: 240 mL / OUT: 1050 mL / NET: -810 mL        PHYSICAL EXAM:  Vital Signs Last 24 Hrs  T(C): 36.1 (24 Aug 2020 05:56), Max: 36.7 (23 Aug 2020 23:03)  T(F): 97 (24 Aug 2020 05:56), Max: 98 (23 Aug 2020 23:03)  HR: 82 (24 Aug 2020 10:00) (70 - 89)  BP: 131/75 (24 Aug 2020 05:56) (131/75 - 154/84)  BP(mean): --  RR: 18 (24 Aug 2020 05:56) (18 - 18)  SpO2: 98% (24 Aug 2020 10:00) (97% - 100%)  CONSTITUTIONAL: no respiratory distress, on NIV  EYES: conjunctiva and sclera clear  ENMT: Moist oral mucosa, no pharyngeal injection or exudates; normal dentition  RESPIRATORY: Normal respiratory effort; lungs are clear to auscultation bilaterally, no wheezes but decreased sounds overall  CARDIOVASCULAR: Regular rate and rhythm, normal S1 and S2, no murmur/rub/gallop; No lower extremity edema; Peripheral pulses are 3+ bilaterally, right foot has a blister with serous fluid  ABDOMEN: Nontender to palpation, normoactive bowel sounds, no rebound/guarding;   PSYCH: A+O to person, place, and time; calm  NEUROLOGY: grossly nonfocal   SKIN: No rashes; no palpable lesions    LABS:                        7.6    7.00  )-----------( 184      ( 23 Aug 2020 06:36 )             26.4     08-24    139  |  95<L>  |  24<H>  ----------------------------<  99  4.1   |  39<H>  |  0.87    Ca    10.1      24 Aug 2020 09:00  Phos  4.4     08-24  Mg     2.0     08-24      RADIOLOGY & ADDITIONAL TESTS:  Results Reviewed:   Imaging Personally Reviewed:  Electrocardiogram Personally Reviewed:    COORDINATION OF CARE: arnaldo hardin recs noted  Care Discussed with Consultants/Other Providers [Y/N]:   Prior or Outpatient Records Reviewed [Y/N]:

## 2020-08-25 DIAGNOSIS — M79.672 PAIN IN LEFT FOOT: ICD-10-CM

## 2020-08-25 LAB
ANION GAP SERPL CALC-SCNC: 10 MMOL/L — SIGNIFICANT CHANGE UP (ref 5–17)
BASE EXCESS BLDA CALC-SCNC: 11.8 MMOL/L — HIGH (ref -2–2)
BLD GP AB SCN SERPL QL: NEGATIVE — SIGNIFICANT CHANGE UP
BUN SERPL-MCNC: 27 MG/DL — HIGH (ref 7–23)
CALCIUM SERPL-MCNC: 9.7 MG/DL — SIGNIFICANT CHANGE UP (ref 8.4–10.5)
CHLORIDE SERPL-SCNC: 92 MMOL/L — LOW (ref 96–108)
CO2 BLDA-SCNC: 41 MMOL/L — HIGH (ref 22–30)
CO2 SERPL-SCNC: 35 MMOL/L — HIGH (ref 22–31)
CREAT SERPL-MCNC: 0.92 MG/DL — SIGNIFICANT CHANGE UP (ref 0.5–1.3)
GLUCOSE BLDC GLUCOMTR-MCNC: 137 MG/DL — HIGH (ref 70–99)
GLUCOSE BLDC GLUCOMTR-MCNC: 157 MG/DL — HIGH (ref 70–99)
GLUCOSE BLDC GLUCOMTR-MCNC: 81 MG/DL — SIGNIFICANT CHANGE UP (ref 70–99)
GLUCOSE BLDC GLUCOMTR-MCNC: 96 MG/DL — SIGNIFICANT CHANGE UP (ref 70–99)
GLUCOSE SERPL-MCNC: 121 MG/DL — HIGH (ref 70–99)
HCO3 BLDA-SCNC: 39 MMOL/L — HIGH (ref 21–29)
HCT VFR BLD CALC: 25.3 % — LOW (ref 34.5–45)
HGB BLD-MCNC: 7.4 G/DL — LOW (ref 11.5–15.5)
HOROWITZ INDEX BLDA+IHG-RTO: 36 — SIGNIFICANT CHANGE UP
MAGNESIUM SERPL-MCNC: 1.9 MG/DL — SIGNIFICANT CHANGE UP (ref 1.6–2.6)
MCHC RBC-ENTMCNC: 24.6 PG — LOW (ref 27–34)
MCHC RBC-ENTMCNC: 29.2 GM/DL — LOW (ref 32–36)
MCV RBC AUTO: 84.1 FL — SIGNIFICANT CHANGE UP (ref 80–100)
NRBC # BLD: 0 /100 WBCS — SIGNIFICANT CHANGE UP (ref 0–0)
PCO2 BLDA: 78 MMHG — CRITICAL HIGH (ref 32–46)
PH BLDA: 7.32 — LOW (ref 7.35–7.45)
PHOSPHATE SERPL-MCNC: 4.3 MG/DL — SIGNIFICANT CHANGE UP (ref 2.5–4.5)
PLATELET # BLD AUTO: 139 K/UL — LOW (ref 150–400)
PO2 BLDA: 46 MMHG — CRITICAL LOW (ref 74–108)
POTASSIUM SERPL-MCNC: 4.3 MMOL/L — SIGNIFICANT CHANGE UP (ref 3.5–5.3)
POTASSIUM SERPL-SCNC: 4.3 MMOL/L — SIGNIFICANT CHANGE UP (ref 3.5–5.3)
RBC # BLD: 3.01 M/UL — LOW (ref 3.8–5.2)
RBC # FLD: 15.1 % — HIGH (ref 10.3–14.5)
RH IG SCN BLD-IMP: POSITIVE — SIGNIFICANT CHANGE UP
SAO2 % BLDA: 75 % — LOW (ref 92–96)
SODIUM SERPL-SCNC: 137 MMOL/L — SIGNIFICANT CHANGE UP (ref 135–145)
WBC # BLD: 6.24 K/UL — SIGNIFICANT CHANGE UP (ref 3.8–10.5)
WBC # FLD AUTO: 6.24 K/UL — SIGNIFICANT CHANGE UP (ref 3.8–10.5)

## 2020-08-25 PROCEDURE — 99233 SBSQ HOSP IP/OBS HIGH 50: CPT

## 2020-08-25 RX ORDER — TRAMADOL HYDROCHLORIDE 50 MG/1
25 TABLET ORAL ONCE
Refills: 0 | Status: DISCONTINUED | OUTPATIENT
Start: 2020-08-25 | End: 2020-08-25

## 2020-08-25 RX ORDER — INSULIN GLARGINE 100 [IU]/ML
10 INJECTION, SOLUTION SUBCUTANEOUS ONCE
Refills: 0 | Status: COMPLETED | OUTPATIENT
Start: 2020-08-25 | End: 2020-08-25

## 2020-08-25 RX ORDER — ACETAMINOPHEN 500 MG
1000 TABLET ORAL ONCE
Refills: 0 | Status: COMPLETED | OUTPATIENT
Start: 2020-08-25 | End: 2020-08-25

## 2020-08-25 RX ORDER — BUMETANIDE 0.25 MG/ML
1 INJECTION INTRAMUSCULAR; INTRAVENOUS ONCE
Refills: 0 | Status: COMPLETED | OUTPATIENT
Start: 2020-08-25 | End: 2020-08-25

## 2020-08-25 RX ADMIN — Medication 3 MILLILITER(S): at 12:58

## 2020-08-25 RX ADMIN — Medication 180 MILLIGRAM(S): at 06:21

## 2020-08-25 RX ADMIN — BUMETANIDE 1 MILLIGRAM(S): 0.25 INJECTION INTRAMUSCULAR; INTRAVENOUS at 15:25

## 2020-08-25 RX ADMIN — Medication 5 UNIT(S): at 09:22

## 2020-08-25 RX ADMIN — Medication 2000 UNIT(S): at 13:01

## 2020-08-25 RX ADMIN — Medication 1: at 14:14

## 2020-08-25 RX ADMIN — Medication 400 MILLIGRAM(S): at 13:00

## 2020-08-25 RX ADMIN — Medication 1000 MILLIGRAM(S): at 23:00

## 2020-08-25 RX ADMIN — BUMETANIDE 1 MILLIGRAM(S): 0.25 INJECTION INTRAMUSCULAR; INTRAVENOUS at 06:21

## 2020-08-25 RX ADMIN — Medication 5 UNIT(S): at 14:14

## 2020-08-25 RX ADMIN — BUDESONIDE AND FORMOTEROL FUMARATE DIHYDRATE 2 PUFF(S): 160; 4.5 AEROSOL RESPIRATORY (INHALATION) at 06:21

## 2020-08-25 RX ADMIN — Medication 5 UNIT(S): at 19:02

## 2020-08-25 RX ADMIN — CHLORHEXIDINE GLUCONATE 1 APPLICATION(S): 213 SOLUTION TOPICAL at 13:01

## 2020-08-25 RX ADMIN — TRAMADOL HYDROCHLORIDE 25 MILLIGRAM(S): 50 TABLET ORAL at 07:45

## 2020-08-25 RX ADMIN — Medication 3 MILLILITER(S): at 23:54

## 2020-08-25 RX ADMIN — TRAMADOL HYDROCHLORIDE 25 MILLIGRAM(S): 50 TABLET ORAL at 18:35

## 2020-08-25 RX ADMIN — PANTOPRAZOLE SODIUM 40 MILLIGRAM(S): 20 TABLET, DELAYED RELEASE ORAL at 06:21

## 2020-08-25 RX ADMIN — Medication 5 MILLILITER(S): at 12:59

## 2020-08-25 RX ADMIN — Medication 1 TABLET(S): at 13:00

## 2020-08-25 RX ADMIN — TIOTROPIUM BROMIDE 1 CAPSULE(S): 18 CAPSULE ORAL; RESPIRATORY (INHALATION) at 13:00

## 2020-08-25 RX ADMIN — BUDESONIDE AND FORMOTEROL FUMARATE DIHYDRATE 2 PUFF(S): 160; 4.5 AEROSOL RESPIRATORY (INHALATION) at 18:36

## 2020-08-25 RX ADMIN — APIXABAN 5 MILLIGRAM(S): 2.5 TABLET, FILM COATED ORAL at 18:35

## 2020-08-25 RX ADMIN — APIXABAN 5 MILLIGRAM(S): 2.5 TABLET, FILM COATED ORAL at 06:21

## 2020-08-25 RX ADMIN — MONTELUKAST 10 MILLIGRAM(S): 4 TABLET, CHEWABLE ORAL at 13:01

## 2020-08-25 RX ADMIN — Medication 5 MILLILITER(S): at 18:36

## 2020-08-25 RX ADMIN — Medication 81 MILLIGRAM(S): at 13:00

## 2020-08-25 RX ADMIN — SENNA PLUS 2 TABLET(S): 8.6 TABLET ORAL at 22:09

## 2020-08-25 RX ADMIN — Medication 3 MILLILITER(S): at 18:35

## 2020-08-25 RX ADMIN — INSULIN GLARGINE 10 UNIT(S): 100 INJECTION, SOLUTION SUBCUTANEOUS at 22:34

## 2020-08-25 RX ADMIN — TRAMADOL HYDROCHLORIDE 25 MILLIGRAM(S): 50 TABLET ORAL at 06:45

## 2020-08-25 RX ADMIN — ATORVASTATIN CALCIUM 80 MILLIGRAM(S): 80 TABLET, FILM COATED ORAL at 22:08

## 2020-08-25 RX ADMIN — Medication 400 MILLIGRAM(S): at 22:07

## 2020-08-25 RX ADMIN — Medication 3 MILLILITER(S): at 06:22

## 2020-08-25 RX ADMIN — Medication 3 MILLILITER(S): at 00:35

## 2020-08-25 NOTE — PROGRESS NOTE ADULT - SUBJECTIVE AND OBJECTIVE BOX
CARDIOLOGY FOLLOW UP - Dr. Brunson    CC no acute complaints       PHYSICAL EXAM:  T(C): 36.4 (08-25-20 @ 07:15), Max: 36.7 (08-25-20 @ 00:02)  HR: 83 (08-25-20 @ 09:26) (82 - 89)  BP: 144/63 (08-25-20 @ 07:15) (130/74 - 144/63)  RR: 18 (08-25-20 @ 07:15) (18 - 18)  SpO2: 100% (08-25-20 @ 09:26) (98% - 100%)  Wt(kg): --  I&O's Summary    24 Aug 2020 07:01  -  25 Aug 2020 07:00  --------------------------------------------------------  IN: 0 mL / OUT: 800 mL / NET: -800 mL    Appearance: Normal	  Cardiovascular: Normal S1 S2,RRR, No JVD, No murmurs  Respiratory: diminished 	  Gastrointestinal:  Soft, Non-tender, + BS	  Extremities:  bl le edema ++ blister      MEDICATIONS  (STANDING):  albuterol/ipratropium for Nebulization 3 milliLiter(s) Nebulizer every 6 hours  apixaban 5 milliGRAM(s) Oral every 12 hours  aspirin enteric coated 81 milliGRAM(s) Oral daily  atorvastatin 80 milliGRAM(s) Oral at bedtime  azithromycin   Tablet 250 milliGRAM(s) Oral <User Schedule>  Biotene Dry Mouth Oral Rinse 5 milliLiter(s) Swish and Spit two times a day  bisacodyl Suppository 10 milliGRAM(s) Rectal daily  budesonide 160 MICROgram(s)/formoterol 4.5 MICROgram(s) Inhaler 2 Puff(s) Inhalation two times a day  buMETAnide 1 milliGRAM(s) Oral daily  chlorhexidine 2% Cloths 1 Application(s) Topical daily  cholecalciferol 2000 Unit(s) Oral daily  dextrose 5%. 1000 milliLiter(s) (50 mL/Hr) IV Continuous <Continuous>  dextrose 50% Injectable 25 Gram(s) IV Push once  diltiazem    milliGRAM(s) Oral daily  insulin glargine Injectable (LANTUS) 20 Unit(s) SubCutaneous at bedtime  insulin lispro (HumaLOG) corrective regimen sliding scale   SubCutaneous three times a day before meals  insulin lispro (HumaLOG) corrective regimen sliding scale   SubCutaneous at bedtime  insulin lispro Injectable (HumaLOG) 5 Unit(s) SubCutaneous three times a day with meals  lactulose Syrup 15 Gram(s) Oral two times a day  montelukast 10 milliGRAM(s) Oral daily  multivitamin 1 Tablet(s) Oral daily  pantoprazole    Tablet 40 milliGRAM(s) Oral before breakfast  polyethylene glycol 3350 17 Gram(s) Oral daily  senna 2 Tablet(s) Oral at bedtime  theophylline ER (24 Hour) 400 milliGRAM(s) Oral daily  tiotropium 18 MICROgram(s) Capsule 1 Capsule(s) Inhalation daily      TELEMETRY: 	    ECG:  	  RADIOLOGY:   DIAGNOSTIC TESTING:  [ ] Echocardiogram:  [ ]  Catheterization:  [ ] Stress Test:    OTHER: 	    LABS:	 	                                7.4    6.24  )-----------( 139      ( 25 Aug 2020 09:35 )             25.3     08-25    137  |  92<L>  |  27<H>  ----------------------------<  121<H>  4.3   |  35<H>  |  0.92    Ca    9.7      25 Aug 2020 09:35  Phos  4.3     08-25  Mg     1.9     08-25

## 2020-08-25 NOTE — PROGRESS NOTE ADULT - SUBJECTIVE AND OBJECTIVE BOX
[facility] Division of Hospital Medicine  Dileep Salomon MD  Pager (M-F, 8A-5P): [phone]  Other Times:  [phone]    Patient is a 60y old  Female who presents with a chief complaint of 60F p/w generalized weakness and difficulty walking (25 Aug 2020 12:23)    SUBJECTIVE / OVERNIGHT EVENTS: pt c/o L foot pain at site of burst bulla, at rest and with weight bearing no fever.   ADDITIONAL REVIEW OF SYSTEMS:    MEDICATIONS  (STANDING):  albuterol/ipratropium for Nebulization 3 milliLiter(s) Nebulizer every 6 hours  apixaban 5 milliGRAM(s) Oral every 12 hours  aspirin enteric coated 81 milliGRAM(s) Oral daily  atorvastatin 80 milliGRAM(s) Oral at bedtime  azithromycin   Tablet 250 milliGRAM(s) Oral <User Schedule>  Biotene Dry Mouth Oral Rinse 5 milliLiter(s) Swish and Spit two times a day  bisacodyl Suppository 10 milliGRAM(s) Rectal daily  budesonide 160 MICROgram(s)/formoterol 4.5 MICROgram(s) Inhaler 2 Puff(s) Inhalation two times a day  buMETAnide 1 milliGRAM(s) Oral daily  buMETAnide 1 milliGRAM(s) Oral once  chlorhexidine 2% Cloths 1 Application(s) Topical daily  cholecalciferol 2000 Unit(s) Oral daily  dextrose 5%. 1000 milliLiter(s) (50 mL/Hr) IV Continuous <Continuous>  dextrose 50% Injectable 25 Gram(s) IV Push once  diltiazem    milliGRAM(s) Oral daily  insulin glargine Injectable (LANTUS) 20 Unit(s) SubCutaneous at bedtime  insulin lispro (HumaLOG) corrective regimen sliding scale   SubCutaneous three times a day before meals  insulin lispro (HumaLOG) corrective regimen sliding scale   SubCutaneous at bedtime  insulin lispro Injectable (HumaLOG) 5 Unit(s) SubCutaneous three times a day with meals  lactulose Syrup 15 Gram(s) Oral two times a day  montelukast 10 milliGRAM(s) Oral daily  multivitamin 1 Tablet(s) Oral daily  pantoprazole    Tablet 40 milliGRAM(s) Oral before breakfast  polyethylene glycol 3350 17 Gram(s) Oral daily  senna 2 Tablet(s) Oral at bedtime  theophylline ER (24 Hour) 400 milliGRAM(s) Oral daily  tiotropium 18 MICROgram(s) Capsule 1 Capsule(s) Inhalation daily    MEDICATIONS  (PRN):  ALPRAZolam 0.25 milliGRAM(s) Oral two times a day PRN Anxiety  glucagon  Injectable 1 milliGRAM(s) IntraMuscular once PRN Glucose LESS THAN 70 milligrams/deciliter  guaiFENesin   Syrup  (Sugar-Free) 100 milliGRAM(s) Oral every 6 hours PRN Cough      CAPILLARY BLOOD GLUCOSE      POCT Blood Glucose.: 157 mg/dL (25 Aug 2020 14:06)  POCT Blood Glucose.: 137 mg/dL (25 Aug 2020 09:17)  POCT Blood Glucose.: 140 mg/dL (24 Aug 2020 21:37)  POCT Blood Glucose.: 110 mg/dL (24 Aug 2020 19:08)    I&O's Summary    24 Aug 2020 07:01  -  25 Aug 2020 07:00  --------------------------------------------------------  IN: 0 mL / OUT: 800 mL / NET: -800 mL    25 Aug 2020 07:01  -  25 Aug 2020 15:07  --------------------------------------------------------  IN: 0 mL / OUT: 550 mL / NET: -550 mL        PHYSICAL EXAM:  Vital Signs Last 24 Hrs  T(C): 36.4 (25 Aug 2020 07:15), Max: 36.7 (25 Aug 2020 00:02)  T(F): 97.5 (25 Aug 2020 07:15), Max: 98 (25 Aug 2020 00:02)  HR: 83 (25 Aug 2020 09:26) (82 - 89)  BP: 144/63 (25 Aug 2020 07:15) (130/74 - 144/63)  BP(mean): --  RR: 18 (25 Aug 2020 07:15) (18 - 18)  SpO2: 100% (25 Aug 2020 09:26) (98% - 100%)  CONSTITUTIONAL: no respiratory distress, on NIV  EYES: conjunctiva and sclera clear  ENMT: Moist oral mucosa, no pharyngeal injection or exudates; normal dentition  RESPIRATORY: Normal respiratory effort; lungs are clear to auscultation bilaterally, no wheezes but decreased sounds overall  CARDIOVASCULAR: Regular rate and rhythm, normal S1 and S2, no murmur/rub/gallop; No lower extremity edema; Peripheral pulses are 3+ bilaterally, right foot has a blister with serous fluid  ABDOMEN: Nontender to palpation, normoactive bowel sounds, no rebound/guarding;   PSYCH: A+O to person, place, and time; calm  NEUROLOGY: grossly nonfocal   SKIN: No rashes; no palpable lesions. L foot w/ dorsal burst bulla covered with dressing, visible erythema around edges, no tracking up leg. tender to palpation. approx 5-6cm in diameter distal dorsal foot     LABS:                        7.4    6.24  )-----------( 139      ( 25 Aug 2020 09:35 )             25.3     08-25    137  |  92<L>  |  27<H>  ----------------------------<  121<H>  4.3   |  35<H>  |  0.92    Ca    9.7      25 Aug 2020 09:35  Phos  4.3     08-25  Mg     1.9     08-25        Blood Gas Profile - Arterial (08.25.20 @ 14:06)    pH, Arterial: 7.32    pCO2, Arterial: 78 mmHg    pO2, Arterial: 46 mmHg    HCO3, Arterial: 39 mmoL/L    Base Excess, Arterial: 11.8 mmol/L    Oxygen Saturation, Arterial: 75 %    Total CO2, Arterial: 41 mmoL/L    FIO2, Arterial: 36              RADIOLOGY & ADDITIONAL TESTS:  Results Reviewed:   Imaging Personally Reviewed:  Electrocardiogram Personally Reviewed:    COORDINATION OF CARE:  Care Discussed with Consultants/Other Providers [Y/N]:  Prior or Outpatient Records Reviewed [Y/N]:

## 2020-08-25 NOTE — PROGRESS NOTE ADULT - ASSESSMENT
60F w COPD on 3LNC (?pending transplant list at [facility]), former smoker, CAD s/p stent (2016), afib on eliquis, uncontrolled DM2, raynaud phenomenon and recent hospitalization at [facility] for altered mental status and [facility] aw COPD exacerbation, LE swelling, weakness.

## 2020-08-25 NOTE — PROGRESS NOTE ADULT - PROBLEM SELECTOR PLAN 5
due to burst bulla, peripheral edema. erythema and tenderness but no tracking, no wbc or fever. topical wound mgmt at this time - monitor off abx. pain control w tramadol  podiatry follow up at patient request  dispo to acute rehab

## 2020-08-25 NOTE — OCCUPATIONAL THERAPY INITIAL EVALUATION ADULT - PERTINENT HX OF CURRENT PROBLEM, REHAB EVAL
60F w/ end stage COPD on 3LNC (pending transplant list at [facility]), former smoker, CAD s/p stent (2016), afib on eliquis, uncontrolled DM2, raynaud phenomenon and recent hospitalization at [facility] for altered mental status and [facility] presents to [facility] for evaluation of generalized weakness and difficulty walking. Patient reports that she was recently hospitalized at [facility] from 7/25-7/31 for confusion and [facility].

## 2020-08-25 NOTE — OCCUPATIONAL THERAPY INITIAL EVALUATION ADULT - ADDITIONAL COMMENTS
Pt reports that pta she lived with her brother in a prvt house, 6 the 5 steps within house. Bathroom with stall shower +seat with back, +rollator Pt reports she was independent with low level adl's Pt reports that pta she lives with her brother in a prvt house, 6 the 5 steps within house. Bathroom with stall shower +seat with back, +rollator Pt reports she was independent with low level adl's

## 2020-08-25 NOTE — PROGRESS NOTE ADULT - ASSESSMENT
EVAN 1/7/19: no ALINA thrombus, unable to assess LV sys fx  echo 12/7/18: EF 71%, nl LV sys fx, mild diastolic dysfx     a/p    60 year old female with hx of D CHF, HTN, CAD s/p PCI, Afib/ aflutter, s/p Aflutter Ablation 12/2019, COPD, DM2, Anxiety, , raynaud phenomenon presenting with weakness, SOB , hyperkalemia.      1. Dyspnea, Resp failure  -secondary to chronic lung disease, COPD, volume overload  -pulm f/u   -covid 19 negative  -no acs , cv stable   -ecg with known RBBB, new twi noted, hyperkalemia also noted on admission  -echo with normal LVEF, mild diastolic dysfunction   -s/p PO Prednisone  -on Duoneb, Symbicort, Spiriva and Theophylline.    2. CAD s/p PCI   -stable, no cp  -c/w ASA, statin  -echo noted above     3. Afib/aflutter s/p  Aflutter Ablation 12/2019  -in NSR, cw cardizem  mg daily  -CHADsVac=2, c/w eliquis     4. Acute on Chronic Diastolic CHF   -dyspnea mostly secondary to copd  -intermittently on bipap   -echo with normal lVEF   -Restarted on IV Lasix, but held for worsening Cr and metabolic alkalosis   -creat stable, LE edema improved,   -continue bumex 1mg daily, PRN iv doses, diamox prn  -closely monitor bicarb, creat     no cv objection to dc planning to [facility] pulm rehab   dvt ppx

## 2020-08-25 NOTE — OCCUPATIONAL THERAPY INITIAL EVALUATION ADULT - PRECAUTIONS/LIMITATIONS, REHAB EVAL
no known precautions/limitations no known precautions/limitations/continue:While at [facility] she had head CT reported to be normal and she declined MR brain. Her Arturo (Cr increased to 1.5) was reported to resolve with IV fluid. She was discharged to home 1d prior to presentation to [facility] and reports since discharge she has had generalized weakness, inability to walk due to leg heaviness/weakness L>R, and sensation that she has retained fluid in her legs. At baseline she walks with a walker and per chart review her PCP was concerned this month that the patient was developing steroid-induced myopathy and planned on having patient evaluated by neurology as well as obtain MRI at [facility].

## 2020-08-25 NOTE — PROGRESS NOTE ADULT - PROBLEM SELECTOR PLAN 2
Given Diamox with poor response. Restarted on IV Lasix, but developed worsening Cr and metabolic alkalosis, lasix held. Now on Bumex w improvement in LE edema.   Patient has a primary respiratory acidosis with a secondary metabolic alkalosis.  hold off on additional diamox doses  Cont bumex 1mg qd, - prn po doses to try to help with blistering swelling in her feet today, monitor creatinine closely

## 2020-08-26 ENCOUNTER — APPOINTMENT (OUTPATIENT)
Dept: CARDIOLOGY | Facility: CLINIC | Age: 60
End: 2020-08-26

## 2020-08-26 DIAGNOSIS — D64.9 ANEMIA, UNSPECIFIED: ICD-10-CM

## 2020-08-26 LAB
ANION GAP SERPL CALC-SCNC: 11 MMOL/L — SIGNIFICANT CHANGE UP (ref 5–17)
BUN SERPL-MCNC: 27 MG/DL — HIGH (ref 7–23)
CALCIUM SERPL-MCNC: 9.5 MG/DL — SIGNIFICANT CHANGE UP (ref 8.4–10.5)
CHLORIDE SERPL-SCNC: 91 MMOL/L — LOW (ref 96–108)
CO2 SERPL-SCNC: 36 MMOL/L — HIGH (ref 22–31)
CREAT SERPL-MCNC: 0.93 MG/DL — SIGNIFICANT CHANGE UP (ref 0.5–1.3)
FERRITIN SERPL-MCNC: 22 NG/ML — SIGNIFICANT CHANGE UP (ref 15–150)
GLUCOSE BLDC GLUCOMTR-MCNC: 105 MG/DL — HIGH (ref 70–99)
GLUCOSE BLDC GLUCOMTR-MCNC: 129 MG/DL — HIGH (ref 70–99)
GLUCOSE BLDC GLUCOMTR-MCNC: 185 MG/DL — HIGH (ref 70–99)
GLUCOSE BLDC GLUCOMTR-MCNC: 199 MG/DL — HIGH (ref 70–99)
GLUCOSE BLDC GLUCOMTR-MCNC: 82 MG/DL — SIGNIFICANT CHANGE UP (ref 70–99)
GLUCOSE BLDC GLUCOMTR-MCNC: 90 MG/DL — SIGNIFICANT CHANGE UP (ref 70–99)
GLUCOSE SERPL-MCNC: 123 MG/DL — HIGH (ref 70–99)
HCT VFR BLD CALC: 23.9 % — LOW (ref 34.5–45)
HCT VFR BLD CALC: 24.7 % — LOW (ref 34.5–45)
HGB BLD-MCNC: 6.8 G/DL — CRITICAL LOW (ref 11.5–15.5)
HGB BLD-MCNC: 7 G/DL — CRITICAL LOW (ref 11.5–15.5)
LDH SERPL L TO P-CCNC: 232 U/L — SIGNIFICANT CHANGE UP (ref 50–242)
MAGNESIUM SERPL-MCNC: 2 MG/DL — SIGNIFICANT CHANGE UP (ref 1.6–2.6)
MCHC RBC-ENTMCNC: 23.9 PG — LOW (ref 27–34)
MCHC RBC-ENTMCNC: 23.9 PG — LOW (ref 27–34)
MCHC RBC-ENTMCNC: 28.3 GM/DL — LOW (ref 32–36)
MCHC RBC-ENTMCNC: 28.5 GM/DL — LOW (ref 32–36)
MCV RBC AUTO: 83.9 FL — SIGNIFICANT CHANGE UP (ref 80–100)
MCV RBC AUTO: 84.3 FL — SIGNIFICANT CHANGE UP (ref 80–100)
NRBC # BLD: 0 /100 WBCS — SIGNIFICANT CHANGE UP (ref 0–0)
NRBC # BLD: 0 /100 WBCS — SIGNIFICANT CHANGE UP (ref 0–0)
PHOSPHATE SERPL-MCNC: 3.6 MG/DL — SIGNIFICANT CHANGE UP (ref 2.5–4.5)
PLATELET # BLD AUTO: 121 K/UL — LOW (ref 150–400)
PLATELET # BLD AUTO: 137 K/UL — LOW (ref 150–400)
POTASSIUM SERPL-MCNC: 4.2 MMOL/L — SIGNIFICANT CHANGE UP (ref 3.5–5.3)
POTASSIUM SERPL-SCNC: 4.2 MMOL/L — SIGNIFICANT CHANGE UP (ref 3.5–5.3)
RBC # BLD: 2.85 M/UL — LOW (ref 3.8–5.2)
RBC # BLD: 2.93 M/UL — LOW (ref 3.8–5.2)
RBC # BLD: 2.95 M/UL — LOW (ref 3.8–5.2)
RBC # FLD: 15.2 % — HIGH (ref 10.3–14.5)
RBC # FLD: 15.4 % — HIGH (ref 10.3–14.5)
RETICS #: 59.3 K/UL — SIGNIFICANT CHANGE UP (ref 25–125)
RETICS/RBC NFR: 2 % — SIGNIFICANT CHANGE UP (ref 0.5–2.5)
SODIUM SERPL-SCNC: 138 MMOL/L — SIGNIFICANT CHANGE UP (ref 135–145)
VIT B12 SERPL-MCNC: 908 PG/ML — SIGNIFICANT CHANGE UP (ref 232–1245)
WBC # BLD: 6.47 K/UL — SIGNIFICANT CHANGE UP (ref 3.8–10.5)
WBC # BLD: 6.84 K/UL — SIGNIFICANT CHANGE UP (ref 3.8–10.5)
WBC # FLD AUTO: 6.47 K/UL — SIGNIFICANT CHANGE UP (ref 3.8–10.5)
WBC # FLD AUTO: 6.84 K/UL — SIGNIFICANT CHANGE UP (ref 3.8–10.5)

## 2020-08-26 PROCEDURE — 99233 SBSQ HOSP IP/OBS HIGH 50: CPT

## 2020-08-26 PROCEDURE — 99232 SBSQ HOSP IP/OBS MODERATE 35: CPT

## 2020-08-26 RX ORDER — OXYCODONE HYDROCHLORIDE 5 MG/1
5 TABLET ORAL ONCE
Refills: 0 | Status: DISCONTINUED | OUTPATIENT
Start: 2020-08-26 | End: 2020-08-26

## 2020-08-26 RX ORDER — TRAMADOL HYDROCHLORIDE 50 MG/1
25 TABLET ORAL ONCE
Refills: 0 | Status: DISCONTINUED | OUTPATIENT
Start: 2020-08-26 | End: 2020-08-26

## 2020-08-26 RX ORDER — BUMETANIDE 0.25 MG/ML
1 INJECTION INTRAMUSCULAR; INTRAVENOUS
Refills: 0 | Status: DISCONTINUED | OUTPATIENT
Start: 2020-08-26 | End: 2020-08-28

## 2020-08-26 RX ORDER — ACETAMINOPHEN 500 MG
1000 TABLET ORAL ONCE
Refills: 0 | Status: DISCONTINUED | OUTPATIENT
Start: 2020-08-26 | End: 2020-09-14

## 2020-08-26 RX ORDER — TRAMADOL HYDROCHLORIDE 50 MG/1
25 TABLET ORAL EVERY 6 HOURS
Refills: 0 | Status: DISCONTINUED | OUTPATIENT
Start: 2020-08-26 | End: 2020-08-26

## 2020-08-26 RX ADMIN — Medication 5 MILLILITER(S): at 05:17

## 2020-08-26 RX ADMIN — CHLORHEXIDINE GLUCONATE 1 APPLICATION(S): 213 SOLUTION TOPICAL at 12:10

## 2020-08-26 RX ADMIN — AZITHROMYCIN 250 MILLIGRAM(S): 500 TABLET, FILM COATED ORAL at 18:54

## 2020-08-26 RX ADMIN — TRAMADOL HYDROCHLORIDE 25 MILLIGRAM(S): 50 TABLET ORAL at 03:28

## 2020-08-26 RX ADMIN — BUMETANIDE 1 MILLIGRAM(S): 0.25 INJECTION INTRAMUSCULAR; INTRAVENOUS at 18:59

## 2020-08-26 RX ADMIN — BUDESONIDE AND FORMOTEROL FUMARATE DIHYDRATE 2 PUFF(S): 160; 4.5 AEROSOL RESPIRATORY (INHALATION) at 18:58

## 2020-08-26 RX ADMIN — TRAMADOL HYDROCHLORIDE 25 MILLIGRAM(S): 50 TABLET ORAL at 16:20

## 2020-08-26 RX ADMIN — TIOTROPIUM BROMIDE 1 CAPSULE(S): 18 CAPSULE ORAL; RESPIRATORY (INHALATION) at 12:01

## 2020-08-26 RX ADMIN — Medication 3 MILLILITER(S): at 12:03

## 2020-08-26 RX ADMIN — Medication 400 MILLIGRAM(S): at 11:59

## 2020-08-26 RX ADMIN — PANTOPRAZOLE SODIUM 40 MILLIGRAM(S): 20 TABLET, DELAYED RELEASE ORAL at 05:19

## 2020-08-26 RX ADMIN — Medication 1 TABLET(S): at 11:58

## 2020-08-26 RX ADMIN — BUMETANIDE 1 MILLIGRAM(S): 0.25 INJECTION INTRAMUSCULAR; INTRAVENOUS at 05:16

## 2020-08-26 RX ADMIN — Medication 1: at 13:48

## 2020-08-26 RX ADMIN — OXYCODONE HYDROCHLORIDE 5 MILLIGRAM(S): 5 TABLET ORAL at 18:52

## 2020-08-26 RX ADMIN — SENNA PLUS 2 TABLET(S): 8.6 TABLET ORAL at 21:54

## 2020-08-26 RX ADMIN — MONTELUKAST 10 MILLIGRAM(S): 4 TABLET, CHEWABLE ORAL at 12:00

## 2020-08-26 RX ADMIN — Medication 3 MILLILITER(S): at 08:45

## 2020-08-26 RX ADMIN — INSULIN GLARGINE 20 UNIT(S): 100 INJECTION, SOLUTION SUBCUTANEOUS at 21:54

## 2020-08-26 RX ADMIN — POLYETHYLENE GLYCOL 3350 17 GRAM(S): 17 POWDER, FOR SOLUTION ORAL at 12:01

## 2020-08-26 RX ADMIN — BUMETANIDE 1 MILLIGRAM(S): 0.25 INJECTION INTRAMUSCULAR; INTRAVENOUS at 12:42

## 2020-08-26 RX ADMIN — ATORVASTATIN CALCIUM 80 MILLIGRAM(S): 80 TABLET, FILM COATED ORAL at 21:54

## 2020-08-26 RX ADMIN — APIXABAN 5 MILLIGRAM(S): 2.5 TABLET, FILM COATED ORAL at 18:54

## 2020-08-26 RX ADMIN — APIXABAN 5 MILLIGRAM(S): 2.5 TABLET, FILM COATED ORAL at 05:16

## 2020-08-26 RX ADMIN — TRAMADOL HYDROCHLORIDE 25 MILLIGRAM(S): 50 TABLET ORAL at 15:35

## 2020-08-26 RX ADMIN — BUDESONIDE AND FORMOTEROL FUMARATE DIHYDRATE 2 PUFF(S): 160; 4.5 AEROSOL RESPIRATORY (INHALATION) at 05:17

## 2020-08-26 RX ADMIN — OXYCODONE HYDROCHLORIDE 5 MILLIGRAM(S): 5 TABLET ORAL at 19:37

## 2020-08-26 RX ADMIN — Medication 3 MILLILITER(S): at 18:57

## 2020-08-26 RX ADMIN — TRAMADOL HYDROCHLORIDE 25 MILLIGRAM(S): 50 TABLET ORAL at 03:27

## 2020-08-26 RX ADMIN — OXYCODONE HYDROCHLORIDE 5 MILLIGRAM(S): 5 TABLET ORAL at 23:55

## 2020-08-26 RX ADMIN — Medication 81 MILLIGRAM(S): at 11:59

## 2020-08-26 RX ADMIN — Medication 180 MILLIGRAM(S): at 05:16

## 2020-08-26 RX ADMIN — Medication 2000 UNIT(S): at 12:00

## 2020-08-26 RX ADMIN — Medication 5 MILLILITER(S): at 18:54

## 2020-08-26 RX ADMIN — Medication 5 UNIT(S): at 13:49

## 2020-08-26 RX ADMIN — Medication 5 UNIT(S): at 09:03

## 2020-08-26 NOTE — PROGRESS NOTE ADULT - PROBLEM SELECTOR PLAN 2
pt is closer to net even or maybe slightly net negative given oral intake that is not documented - supported by increasing weight  per pulm - goal for 1-2L net negative - change to IV BID diuresis to achieve  daily BMP, monitor renal function and electrolytes  improving edema will also help with foot pain

## 2020-08-26 NOTE — PROGRESS NOTE ADULT - SUBJECTIVE AND OBJECTIVE BOX
Patient is a 60y old  Female who presents with a chief complaint of 60F p/w generalized weakness and difficulty walking (25 Aug 2020 15:07)       INTERVAL HPI/OVERNIGHT EVENTS:  Patient seen and evaluated at bedside.  Pt is resting comfortable in NAD. Denies N/V/F/C.  Pain rated at 10/10    Allergies    No Known Allergies    Intolerances    albuterol (Unknown)      Vital Signs Last 24 Hrs  T(C): 36.7 (26 Aug 2020 05:14), Max: 36.7 (26 Aug 2020 05:14)  T(F): 98 (26 Aug 2020 05:14), Max: 98 (26 Aug 2020 05:14)  HR: 80 (26 Aug 2020 05:49) (74 - 83)  BP: 127/80 (26 Aug 2020 05:14) (127/80 - 152/85)  BP(mean): --  RR: 20 (26 Aug 2020 05:14) (20 - 20)  SpO2: 95% (26 Aug 2020 05:49) (95% - 100%)    LABS:                        6.8    6.47  )-----------( 121      ( 26 Aug 2020 06:47 )             23.9     08-26    138  |  91<L>  |  27<H>  ----------------------------<  123<H>  4.2   |  36<H>  |  0.93    Ca    9.5      26 Aug 2020 06:39  Phos  3.6     08-26  Mg     2.0     08-26          CAPILLARY BLOOD GLUCOSE      POCT Blood Glucose.: 90 mg/dL (26 Aug 2020 08:23)  POCT Blood Glucose.: 129 mg/dL (26 Aug 2020 05:18)  POCT Blood Glucose.: 81 mg/dL (25 Aug 2020 21:49)  POCT Blood Glucose.: 96 mg/dL (25 Aug 2020 18:56)  POCT Blood Glucose.: 157 mg/dL (25 Aug 2020 14:06)  POCT Blood Glucose.: 137 mg/dL (25 Aug 2020 09:17)      Lower Extremity Physical Exam:  Vascular: DP/PT n/p, B/L with known h/o raynauds, CFT <_5 seconds B/L, Temperature gradient _warm to cool, B/L.   Neuro: Epicritic sensation intact to the level of _toes, B/L.  Skin: Postive mycotic toenails x10  Wound #1:   Location: dorsum left forefoot  Size: 6cm diameter  Depth: superficial  Wound bed: bullae  Drainage:  clear serous fluid/fluctuant  Odor: none  Periwound: no clinical signs of infection  Etiology: bullae/dm

## 2020-08-26 NOTE — PROGRESS NOTE ADULT - ASSESSMENT
60F w COPD on 3LNC (?pending transplant list at [facility]), former smoker, CAD s/p stent (2016), afib on eliquis, uncontrolled DM2, raynaud phenomenon and recent hospitalization at [facility] for altered mental status and [facility] aw COPD exacerbation, LE swelling, weakness.

## 2020-08-26 NOTE — PROGRESS NOTE ADULT - SUBJECTIVE AND OBJECTIVE BOX
CHIEF COMPLAINT: Dyspnea    Interval Events: No acute events. Breathing unchanged, patient reports continued difficulty breathing and need for frequent BiPAP use. Leg swelling not improved. Continues to have severe foot pain. No fevers, chills, cough, nausea, emesis, or diarrhea.     REVIEW OF SYSTEMS:  See above. ROS otherwise negative.    OBJECTIVE:  ICU Vital Signs Last 24 Hrs  T(C): 36.7 (26 Aug 2020 05:14), Max: 36.7 (26 Aug 2020 05:14)  T(F): 98 (26 Aug 2020 05:14), Max: 98 (26 Aug 2020 05:14)  HR: 74 (26 Aug 2020 09:17) (74 - 80)  BP: 127/80 (26 Aug 2020 05:14) (127/80 - 152/85)  BP(mean): --  ABP: --  ABP(mean): --  RR: 20 (26 Aug 2020 05:14) (20 - 20)  SpO2: 96% (26 Aug 2020 09:17) (95% - 100%)        08-25 @ 07:01 - 08-26 @ 07:00  --------------------------------------------------------  IN: 0 mL / OUT: 1450 mL / NET: -1450 mL    08-26 @ 07:01  -  08-26 @ 10:20  --------------------------------------------------------  IN: 0 mL / OUT: 600 mL / NET: -600 mL      CAPILLARY BLOOD GLUCOSE      POCT Blood Glucose.: 90 mg/dL (26 Aug 2020 08:23)      PHYSICAL EXAM:  General: Morbidly obese F sitting in bed, NAD, uncomfortable  HEENT: NC/AT sclerae anicteric  Respiratory: Mildly increased WOB, + accessory muscle use. Diminished breath sounds b/l   Cardiovascular: S1, S2  Abdomen: Soft, + BS  Extremities: WWP, tender to palpation, 4+ pitting edema  Neurological: Awake, alert, follows commands  Psychiatry: Frustrated, but appropriate    HOSPITAL MEDICATIONS:  MEDICATIONS  (STANDING):  albuterol/ipratropium for Nebulization 3 milliLiter(s) Nebulizer every 6 hours  apixaban 5 milliGRAM(s) Oral every 12 hours  aspirin enteric coated 81 milliGRAM(s) Oral daily  atorvastatin 80 milliGRAM(s) Oral at bedtime  azithromycin   Tablet 250 milliGRAM(s) Oral <User Schedule>  Biotene Dry Mouth Oral Rinse 5 milliLiter(s) Swish and Spit two times a day  bisacodyl Suppository 10 milliGRAM(s) Rectal daily  budesonide 160 MICROgram(s)/formoterol 4.5 MICROgram(s) Inhaler 2 Puff(s) Inhalation two times a day  buMETAnide 1 milliGRAM(s) Oral daily  chlorhexidine 2% Cloths 1 Application(s) Topical daily  cholecalciferol 2000 Unit(s) Oral daily  dextrose 5%. 1000 milliLiter(s) (50 mL/Hr) IV Continuous <Continuous>  dextrose 50% Injectable 25 Gram(s) IV Push once  diltiazem    milliGRAM(s) Oral daily  insulin glargine Injectable (LANTUS) 20 Unit(s) SubCutaneous at bedtime  insulin lispro (HumaLOG) corrective regimen sliding scale   SubCutaneous three times a day before meals  insulin lispro (HumaLOG) corrective regimen sliding scale   SubCutaneous at bedtime  insulin lispro Injectable (HumaLOG) 5 Unit(s) SubCutaneous three times a day with meals  lactulose Syrup 15 Gram(s) Oral two times a day  montelukast 10 milliGRAM(s) Oral daily  multivitamin 1 Tablet(s) Oral daily  pantoprazole    Tablet 40 milliGRAM(s) Oral before breakfast  polyethylene glycol 3350 17 Gram(s) Oral daily  senna 2 Tablet(s) Oral at bedtime  theophylline ER (24 Hour) 400 milliGRAM(s) Oral daily  tiotropium 18 MICROgram(s) Capsule 1 Capsule(s) Inhalation daily    MEDICATIONS  (PRN):  ALPRAZolam 0.25 milliGRAM(s) Oral two times a day PRN Anxiety  glucagon  Injectable 1 milliGRAM(s) IntraMuscular once PRN Glucose LESS THAN 70 milligrams/deciliter  guaiFENesin   Syrup  (Sugar-Free) 100 milliGRAM(s) Oral every 6 hours PRN Cough      LABS:                        6.8    6.47  )-----------( 121      ( 26 Aug 2020 06:47 )             23.9     Hgb Trend: 6.8<--, 7.4<--, 7.6<--, 7.6<--, 7.8<--  08-26    138  |  91<L>  |  27<H>  ----------------------------<  123<H>  4.2   |  36<H>  |  0.93    Ca    9.5      26 Aug 2020 06:39  Phos  3.6     08-26  Mg     2.0     08-26      Creatinine Trend: 0.93<--, 0.92<--, 0.87<--, 0.90<--, 0.89<--, 0.82<--      Arterial Blood Gas:  08-25 @ 14:06  7.32/78/46/39/75/11.8  ABG lactate: --        MICROBIOLOGY:       RADIOLOGY:  [x ] Reviewed and interpreted by me

## 2020-08-26 NOTE — PROGRESS NOTE ADULT - SUBJECTIVE AND OBJECTIVE BOX
CARDIOLOGY FOLLOW UP - Dr. Brunson    CC no cp  unchanged SOB  unchanged le edema/blisters and pain.       PHYSICAL EXAM:  T(C): 36.7 (08-26-20 @ 05:14), Max: 36.7 (08-26-20 @ 05:14)  HR: 74 (08-26-20 @ 09:17) (74 - 80)  BP: 127/80 (08-26-20 @ 05:14) (127/80 - 152/85)  RR: 20 (08-26-20 @ 05:14) (20 - 20)  SpO2: 96% (08-26-20 @ 09:17) (95% - 100%)  Wt(kg): --  I&O's Summary    25 Aug 2020 07:01  -  26 Aug 2020 07:00  --------------------------------------------------------  IN: 0 mL / OUT: 1450 mL / NET: -1450 mL    26 Aug 2020 07:01  -  26 Aug 2020 12:49  --------------------------------------------------------  IN: 0 mL / OUT: 600 mL / NET: -600 mL      Appearance: Normal	  Cardiovascular: Normal S1 S2,RRR, No JVD, No murmurs  Respiratory: diminished 	  Gastrointestinal:  Soft, Non-tender, + BS	  Extremities:  bl le edema ++ blister      MEDICATIONS  (STANDING):  albuterol/ipratropium for Nebulization 3 milliLiter(s) Nebulizer every 6 hours  apixaban 5 milliGRAM(s) Oral every 12 hours  aspirin enteric coated 81 milliGRAM(s) Oral daily  atorvastatin 80 milliGRAM(s) Oral at bedtime  azithromycin   Tablet 250 milliGRAM(s) Oral <User Schedule>  Biotene Dry Mouth Oral Rinse 5 milliLiter(s) Swish and Spit two times a day  bisacodyl Suppository 10 milliGRAM(s) Rectal daily  budesonide 160 MICROgram(s)/formoterol 4.5 MICROgram(s) Inhaler 2 Puff(s) Inhalation two times a day  buMETAnide Injectable 1 milliGRAM(s) IV Push two times a day  chlorhexidine 2% Cloths 1 Application(s) Topical daily  cholecalciferol 2000 Unit(s) Oral daily  dextrose 5%. 1000 milliLiter(s) (50 mL/Hr) IV Continuous <Continuous>  dextrose 50% Injectable 25 Gram(s) IV Push once  diltiazem    milliGRAM(s) Oral daily  insulin glargine Injectable (LANTUS) 20 Unit(s) SubCutaneous at bedtime  insulin lispro (HumaLOG) corrective regimen sliding scale   SubCutaneous three times a day before meals  insulin lispro (HumaLOG) corrective regimen sliding scale   SubCutaneous at bedtime  insulin lispro Injectable (HumaLOG) 5 Unit(s) SubCutaneous three times a day with meals  lactulose Syrup 15 Gram(s) Oral two times a day  montelukast 10 milliGRAM(s) Oral daily  multivitamin 1 Tablet(s) Oral daily  pantoprazole    Tablet 40 milliGRAM(s) Oral before breakfast  polyethylene glycol 3350 17 Gram(s) Oral daily  senna 2 Tablet(s) Oral at bedtime  theophylline ER (24 Hour) 400 milliGRAM(s) Oral daily  tiotropium 18 MICROgram(s) Capsule 1 Capsule(s) Inhalation daily      TELEMETRY: 	    ECG:  	  RADIOLOGY:   DIAGNOSTIC TESTING:  [ ] Echocardiogram:  [ ]  Catheterization:  [ ] Stress Test:    OTHER: 	    LABS:	 	                                7.0    6.84  )-----------( 137      ( 26 Aug 2020 09:47 )             24.7     08-26    138  |  91<L>  |  27<H>  ----------------------------<  123<H>  4.2   |  36<H>  |  0.93    Ca    9.5      26 Aug 2020 06:39  Phos  3.6     08-26  Mg     2.0     08-26

## 2020-08-26 NOTE — PROGRESS NOTE ADULT - SUBJECTIVE AND OBJECTIVE BOX
[facility] Division of Hospital Medicine  Dileep Salomon MD  Pager (M-F, 8A-5P): [phone]  Other Times:  [phone]    Patient is a 60y old  Female who presents with a chief complaint of 60F p/w generalized weakness and difficulty walking (26 Aug 2020 10:19)    SUBJECTIVE / OVERNIGHT EVENTS: improved L foot pain. no evidence of bleeding in stool or urine. no cp. stable sob. pt drinking multiple 500 cc water bottles daily.   ADDITIONAL REVIEW OF SYSTEMS:    MEDICATIONS  (STANDING):  albuterol/ipratropium for Nebulization 3 milliLiter(s) Nebulizer every 6 hours  apixaban 5 milliGRAM(s) Oral every 12 hours  aspirin enteric coated 81 milliGRAM(s) Oral daily  atorvastatin 80 milliGRAM(s) Oral at bedtime  azithromycin   Tablet 250 milliGRAM(s) Oral <User Schedule>  Biotene Dry Mouth Oral Rinse 5 milliLiter(s) Swish and Spit two times a day  bisacodyl Suppository 10 milliGRAM(s) Rectal daily  budesonide 160 MICROgram(s)/formoterol 4.5 MICROgram(s) Inhaler 2 Puff(s) Inhalation two times a day  buMETAnide Injectable 1 milliGRAM(s) IV Push two times a day  chlorhexidine 2% Cloths 1 Application(s) Topical daily  cholecalciferol 2000 Unit(s) Oral daily  dextrose 5%. 1000 milliLiter(s) (50 mL/Hr) IV Continuous <Continuous>  dextrose 50% Injectable 25 Gram(s) IV Push once  diltiazem    milliGRAM(s) Oral daily  insulin glargine Injectable (LANTUS) 20 Unit(s) SubCutaneous at bedtime  insulin lispro (HumaLOG) corrective regimen sliding scale   SubCutaneous three times a day before meals  insulin lispro (HumaLOG) corrective regimen sliding scale   SubCutaneous at bedtime  insulin lispro Injectable (HumaLOG) 5 Unit(s) SubCutaneous three times a day with meals  lactulose Syrup 15 Gram(s) Oral two times a day  montelukast 10 milliGRAM(s) Oral daily  multivitamin 1 Tablet(s) Oral daily  pantoprazole    Tablet 40 milliGRAM(s) Oral before breakfast  polyethylene glycol 3350 17 Gram(s) Oral daily  senna 2 Tablet(s) Oral at bedtime  theophylline ER (24 Hour) 400 milliGRAM(s) Oral daily  tiotropium 18 MICROgram(s) Capsule 1 Capsule(s) Inhalation daily    MEDICATIONS  (PRN):  ALPRAZolam 0.25 milliGRAM(s) Oral two times a day PRN Anxiety  glucagon  Injectable 1 milliGRAM(s) IntraMuscular once PRN Glucose LESS THAN 70 milligrams/deciliter  guaiFENesin   Syrup  (Sugar-Free) 100 milliGRAM(s) Oral every 6 hours PRN Cough      CAPILLARY BLOOD GLUCOSE      POCT Blood Glucose.: 90 mg/dL (26 Aug 2020 08:23)  POCT Blood Glucose.: 129 mg/dL (26 Aug 2020 05:18)  POCT Blood Glucose.: 81 mg/dL (25 Aug 2020 21:49)  POCT Blood Glucose.: 96 mg/dL (25 Aug 2020 18:56)  POCT Blood Glucose.: 157 mg/dL (25 Aug 2020 14:06)    I&O's Summary    25 Aug 2020 07:01  -  26 Aug 2020 07:00  --------------------------------------------------------  IN: 0 mL / OUT: 1450 mL / NET: -1450 mL    26 Aug 2020 07:01  -  26 Aug 2020 11:36  --------------------------------------------------------  IN: 0 mL / OUT: 600 mL / NET: -600 mL        PHYSICAL EXAM:  Vital Signs Last 24 Hrs  T(C): 36.7 (26 Aug 2020 05:14), Max: 36.7 (26 Aug 2020 05:14)  T(F): 98 (26 Aug 2020 05:14), Max: 98 (26 Aug 2020 05:14)  HR: 74 (26 Aug 2020 09:17) (74 - 80)  BP: 127/80 (26 Aug 2020 05:14) (127/80 - 152/85)  BP(mean): --  RR: 20 (26 Aug 2020 05:14) (20 - 20)  SpO2: 96% (26 Aug 2020 09:17) (95% - 100%)  CONSTITUTIONAL: mild respiratory distress, on NIV  EYES: conjunctiva and sclera clear  ENMT: Moist oral mucosa, no pharyngeal injection or exudates; normal dentition  RESPIRATORY: Normal respiratory effort; lungs are clear to auscultation bilaterally, no wheezes but decreased sounds overall  CARDIOVASCULAR: Regular rate and rhythm, normal S1 and S2, no murmur/rub/gallop; 2+ dependent b/l LE edema beyond knee  ABDOMEN: Nontender to palpation, normoactive bowel sounds, no rebound/guarding;   PSYCH: A+O to person, place, and time; calm  NEUROLOGY: grossly nonfocal   SKIN: No rashes; no palpable lesions. L foot w/ dorsal burst bulla covered with dressing, visible erythema around edges, no tracking up leg. tender to palpation. approx 5-6cm in diameter distal dorsal foot     LABS:                        7.0    6.84  )-----------( 137      ( 26 Aug 2020 09:47 )             24.7     08-26    138  |  91<L>  |  27<H>  ----------------------------<  123<H>  4.2   |  36<H>  |  0.93    Ca    9.5      26 Aug 2020 06:39  Phos  3.6     08-26  Mg     2.0     08-26        RADIOLOGY & ADDITIONAL TESTS:  Results Reviewed:   Imaging Personally Reviewed:  Electrocardiogram Personally Reviewed:    COORDINATION OF CARE: podiatry/cards recs appreciated  Care Discussed with Consultants/Other Providers [Y/N]: pulm Dr Castro re: diuretic mgmt   Prior or Outpatient Records Reviewed [Y/N]:

## 2020-08-26 NOTE — PROGRESS NOTE ADULT - ASSESSMENT
61 y/o F w/chronic respiratory failure w/hypoxia and hypercapnia secondary to combination of HFpEF and COPD on home O2 admitted for acute on chronic respiratory failure with hypoxia and hypercapnia likely secondary to COPD exacerbation and acute pulmonary edema secondary to acute on chronic HFpEF. I/O not being recorded correctly, patient reports drinking to 500ml bottles of water daily.     - Patient with chronic respiratory acidosis w/compensation + minor metabolic alkalosis. Would not dose with diamox currently as pH is still in acidotic range and dumping bicarb can send patient into respiratory failure as she will not be able compensate for low pH with improved respiratory CO2 clearance  - Diuresis goal net negative 1-2L, would change bumex to IV and would dose as needed to achieve at least 2L of UOP daily (as patient is drinking at least 1L of water daily) until swelling is improved.   - Strict I/Os  - Continue supplemental O2  - Continue bronchodilators, theophyline   - Pain control for lower extremity edema and blisters

## 2020-08-26 NOTE — PROGRESS NOTE ADULT - PROBLEM SELECTOR PLAN 1
Dyspnea multifactorial in the setting of underlying lung disease, cardiovascular dysfunction, obesity, and deconditioning.     was given prolonged course of Prednisone, now tapered off. Overnight and prn Bipap. Pt comfortable off Bipap and able to speak in full sentences.   - cont azithro, duonebs, spiriva, symbicort, singulair    Continue supplemental O2 with goal O2 sat > 88% - she does not need to be at 100%.

## 2020-08-26 NOTE — CHART NOTE - NSCHARTNOTEFT_GEN_A_CORE
Nutrition Follow Up Note  Patient seen for: LOS follow up     Interim events noted, chart reviewed. Pt c COPD exacerbation. Noted pt c anemia, pain in legs due to edema and blistering.     Source: pt, PCA     Diet : Diet, Consistent Carbohydrate w/Evening Snack (08-08-20 @ 23:08)    Patient reports: she is eating well and declines any nutritional concerns at this time. As per PCA, pt has been eating most of her meals without any issues at this time.       Daily Weight: 8/11: 200.1->8/19: 204->8/26: 205.2 pounds, likely elevated due to edema, pt c reported wt of 172 pounds     Pertinent Medications: MEDICATIONS  (STANDING):  albuterol/ipratropium for Nebulization 3 milliLiter(s) Nebulizer every 6 hours  apixaban 5 milliGRAM(s) Oral every 12 hours  aspirin enteric coated 81 milliGRAM(s) Oral daily  atorvastatin 80 milliGRAM(s) Oral at bedtime  azithromycin   Tablet 250 milliGRAM(s) Oral <User Schedule>  Biotene Dry Mouth Oral Rinse 5 milliLiter(s) Swish and Spit two times a day  bisacodyl Suppository 10 milliGRAM(s) Rectal daily  budesonide 160 MICROgram(s)/formoterol 4.5 MICROgram(s) Inhaler 2 Puff(s) Inhalation two times a day  buMETAnide Injectable 1 milliGRAM(s) IV Push two times a day  chlorhexidine 2% Cloths 1 Application(s) Topical daily  cholecalciferol 2000 Unit(s) Oral daily  dextrose 5%. 1000 milliLiter(s) (50 mL/Hr) IV Continuous <Continuous>  dextrose 50% Injectable 25 Gram(s) IV Push once  diltiazem    milliGRAM(s) Oral daily  insulin glargine Injectable (LANTUS) 20 Unit(s) SubCutaneous at bedtime  insulin lispro (HumaLOG) corrective regimen sliding scale   SubCutaneous three times a day before meals  insulin lispro (HumaLOG) corrective regimen sliding scale   SubCutaneous at bedtime  insulin lispro Injectable (HumaLOG) 5 Unit(s) SubCutaneous three times a day with meals  lactulose Syrup 15 Gram(s) Oral two times a day  montelukast 10 milliGRAM(s) Oral daily  multivitamin 1 Tablet(s) Oral daily  pantoprazole    Tablet 40 milliGRAM(s) Oral before breakfast  polyethylene glycol 3350 17 Gram(s) Oral daily  senna 2 Tablet(s) Oral at bedtime  theophylline ER (24 Hour) 400 milliGRAM(s) Oral daily  tiotropium 18 MICROgram(s) Capsule 1 Capsule(s) Inhalation daily    MEDICATIONS  (PRN):  ALPRAZolam 0.25 milliGRAM(s) Oral two times a day PRN Anxiety  glucagon  Injectable 1 milliGRAM(s) IntraMuscular once PRN Glucose LESS THAN 70 milligrams/deciliter  guaiFENesin   Syrup  (Sugar-Free) 100 milliGRAM(s) Oral every 6 hours PRN Cough    Pertinent Labs: 08-26 @ 06:39: Na 138, BUN 27<H>, Cr 0.93, <H>, K+ 4.2, Phos 3.6, Mg 2.0, Alk Phos --, ALT/SGPT --, AST/SGOT --, HbA1c --    Finger Sticks:  POCT Blood Glucose.: 199 mg/dL (08-26 @ 13:27)  POCT Blood Glucose.: 90 mg/dL (08-26 @ 08:23)  POCT Blood Glucose.: 129 mg/dL (08-26 @ 05:18)  POCT Blood Glucose.: 81 mg/dL (08-25 @ 21:49)  POCT Blood Glucose.: 96 mg/dL (08-25 @ 18:56)      Skin per nursing documentation: no pressure injuries   Edema: +2 danya. leg, +4 danya. ankle     Estimated Needs:   [x] no change since previous assessment      Previous Nutrition Diagnosis: altered nutrition related lab values  Nutrition Diagnosis continues at this time     New Nutrition Diagnosis: none at this time       Recommend  1. Continue c current diet.   2. Encouraged continued good PO intake at this time. Pt aware RD remains available as needed.     Monitoring and Evaluation:     Continue to monitor Nutritional intake, Tolerance to diet prescription, weights, labs, skin integrity    RD remains available upon request and will follow up per protocol  Quin Oviedo MS RD CDN [facility],  #[phone]

## 2020-08-26 NOTE — PROGRESS NOTE ADULT - PROBLEM SELECTOR PLAN 6
Hb slowly drifting down since admission - now around 7  pt notes she has history of B12 def  suspect additional considered causes - iron def, AoCD, phlebotomy   at Hb 7 r/b of transfusion discussed and patient wishes to avoid transfusion and proceed with conservative mgmt  check iron/TIBC/ferritin/B12/MMA/retic  likely will add iron supplement, depending on results may need heme support

## 2020-08-27 LAB
ANION GAP SERPL CALC-SCNC: 10 MMOL/L — SIGNIFICANT CHANGE UP (ref 5–17)
BUN SERPL-MCNC: 24 MG/DL — HIGH (ref 7–23)
CALCIUM SERPL-MCNC: 9.7 MG/DL — SIGNIFICANT CHANGE UP (ref 8.4–10.5)
CHLORIDE SERPL-SCNC: 91 MMOL/L — LOW (ref 96–108)
CO2 SERPL-SCNC: 37 MMOL/L — HIGH (ref 22–31)
CREAT SERPL-MCNC: 1.03 MG/DL — SIGNIFICANT CHANGE UP (ref 0.5–1.3)
GLUCOSE BLDC GLUCOMTR-MCNC: 107 MG/DL — HIGH (ref 70–99)
GLUCOSE BLDC GLUCOMTR-MCNC: 111 MG/DL — HIGH (ref 70–99)
GLUCOSE BLDC GLUCOMTR-MCNC: 131 MG/DL — HIGH (ref 70–99)
GLUCOSE BLDC GLUCOMTR-MCNC: 56 MG/DL — LOW (ref 70–99)
GLUCOSE BLDC GLUCOMTR-MCNC: 85 MG/DL — SIGNIFICANT CHANGE UP (ref 70–99)
GLUCOSE BLDC GLUCOMTR-MCNC: 98 MG/DL — SIGNIFICANT CHANGE UP (ref 70–99)
GLUCOSE SERPL-MCNC: 99 MG/DL — SIGNIFICANT CHANGE UP (ref 70–99)
HCT VFR BLD CALC: 24.5 % — LOW (ref 34.5–45)
HGB BLD-MCNC: 6.9 G/DL — CRITICAL LOW (ref 11.5–15.5)
IRON SATN MFR SERPL: 27 UG/DL — LOW (ref 30–160)
IRON SATN MFR SERPL: 7 % — LOW (ref 14–50)
MCHC RBC-ENTMCNC: 23.9 PG — LOW (ref 27–34)
MCHC RBC-ENTMCNC: 28.2 GM/DL — LOW (ref 32–36)
MCV RBC AUTO: 84.8 FL — SIGNIFICANT CHANGE UP (ref 80–100)
NRBC # BLD: 0 /100 WBCS — SIGNIFICANT CHANGE UP (ref 0–0)
PLATELET # BLD AUTO: 132 K/UL — LOW (ref 150–400)
POTASSIUM SERPL-MCNC: 4.6 MMOL/L — SIGNIFICANT CHANGE UP (ref 3.5–5.3)
POTASSIUM SERPL-SCNC: 4.6 MMOL/L — SIGNIFICANT CHANGE UP (ref 3.5–5.3)
RBC # BLD: 2.89 M/UL — LOW (ref 3.8–5.2)
RBC # FLD: 15.4 % — HIGH (ref 10.3–14.5)
SODIUM SERPL-SCNC: 138 MMOL/L — SIGNIFICANT CHANGE UP (ref 135–145)
TIBC SERPL-MCNC: 371 UG/DL — SIGNIFICANT CHANGE UP (ref 220–430)
UIBC SERPL-MCNC: 344 UG/DL — SIGNIFICANT CHANGE UP (ref 110–370)
WBC # BLD: 6.49 K/UL — SIGNIFICANT CHANGE UP (ref 3.8–10.5)
WBC # FLD AUTO: 6.49 K/UL — SIGNIFICANT CHANGE UP (ref 3.8–10.5)

## 2020-08-27 PROCEDURE — 99233 SBSQ HOSP IP/OBS HIGH 50: CPT

## 2020-08-27 RX ORDER — ASCORBIC ACID 60 MG
500 TABLET,CHEWABLE ORAL DAILY
Refills: 0 | Status: DISCONTINUED | OUTPATIENT
Start: 2020-08-27 | End: 2020-09-14

## 2020-08-27 RX ORDER — FERROUS SULFATE 325(65) MG
325 TABLET ORAL
Refills: 0 | Status: DISCONTINUED | OUTPATIENT
Start: 2020-08-27 | End: 2020-09-14

## 2020-08-27 RX ORDER — OXYCODONE HYDROCHLORIDE 5 MG/1
5 TABLET ORAL EVERY 6 HOURS
Refills: 0 | Status: DISCONTINUED | OUTPATIENT
Start: 2020-08-27 | End: 2020-09-03

## 2020-08-27 RX ADMIN — Medication 325 MILLIGRAM(S): at 13:30

## 2020-08-27 RX ADMIN — MONTELUKAST 10 MILLIGRAM(S): 4 TABLET, CHEWABLE ORAL at 13:25

## 2020-08-27 RX ADMIN — Medication 5 MILLILITER(S): at 06:15

## 2020-08-27 RX ADMIN — OXYCODONE HYDROCHLORIDE 5 MILLIGRAM(S): 5 TABLET ORAL at 11:43

## 2020-08-27 RX ADMIN — Medication 3 MILLILITER(S): at 13:24

## 2020-08-27 RX ADMIN — BUDESONIDE AND FORMOTEROL FUMARATE DIHYDRATE 2 PUFF(S): 160; 4.5 AEROSOL RESPIRATORY (INHALATION) at 06:16

## 2020-08-27 RX ADMIN — Medication 2000 UNIT(S): at 13:24

## 2020-08-27 RX ADMIN — OXYCODONE HYDROCHLORIDE 5 MILLIGRAM(S): 5 TABLET ORAL at 00:55

## 2020-08-27 RX ADMIN — Medication 5 MILLILITER(S): at 19:02

## 2020-08-27 RX ADMIN — Medication 500 MILLIGRAM(S): at 13:30

## 2020-08-27 RX ADMIN — Medication 5 UNIT(S): at 13:21

## 2020-08-27 RX ADMIN — APIXABAN 5 MILLIGRAM(S): 2.5 TABLET, FILM COATED ORAL at 19:02

## 2020-08-27 RX ADMIN — BUMETANIDE 1 MILLIGRAM(S): 0.25 INJECTION INTRAMUSCULAR; INTRAVENOUS at 06:16

## 2020-08-27 RX ADMIN — OXYCODONE HYDROCHLORIDE 5 MILLIGRAM(S): 5 TABLET ORAL at 20:02

## 2020-08-27 RX ADMIN — Medication 1 TABLET(S): at 13:25

## 2020-08-27 RX ADMIN — Medication 81 MILLIGRAM(S): at 13:31

## 2020-08-27 RX ADMIN — Medication 180 MILLIGRAM(S): at 06:15

## 2020-08-27 RX ADMIN — BUMETANIDE 1 MILLIGRAM(S): 0.25 INJECTION INTRAMUSCULAR; INTRAVENOUS at 19:02

## 2020-08-27 RX ADMIN — PANTOPRAZOLE SODIUM 40 MILLIGRAM(S): 20 TABLET, DELAYED RELEASE ORAL at 06:15

## 2020-08-27 RX ADMIN — Medication 100 MILLIGRAM(S): at 13:24

## 2020-08-27 RX ADMIN — Medication 3 MILLILITER(S): at 23:13

## 2020-08-27 RX ADMIN — SENNA PLUS 2 TABLET(S): 8.6 TABLET ORAL at 21:16

## 2020-08-27 RX ADMIN — TIOTROPIUM BROMIDE 1 CAPSULE(S): 18 CAPSULE ORAL; RESPIRATORY (INHALATION) at 13:25

## 2020-08-27 RX ADMIN — Medication 3 MILLILITER(S): at 19:03

## 2020-08-27 RX ADMIN — Medication 3 MILLILITER(S): at 00:02

## 2020-08-27 RX ADMIN — BUDESONIDE AND FORMOTEROL FUMARATE DIHYDRATE 2 PUFF(S): 160; 4.5 AEROSOL RESPIRATORY (INHALATION) at 19:03

## 2020-08-27 RX ADMIN — INSULIN GLARGINE 20 UNIT(S): 100 INJECTION, SOLUTION SUBCUTANEOUS at 21:16

## 2020-08-27 RX ADMIN — Medication 400 MILLIGRAM(S): at 13:24

## 2020-08-27 RX ADMIN — ATORVASTATIN CALCIUM 80 MILLIGRAM(S): 80 TABLET, FILM COATED ORAL at 22:42

## 2020-08-27 RX ADMIN — APIXABAN 5 MILLIGRAM(S): 2.5 TABLET, FILM COATED ORAL at 06:15

## 2020-08-27 RX ADMIN — POLYETHYLENE GLYCOL 3350 17 GRAM(S): 17 POWDER, FOR SOLUTION ORAL at 13:24

## 2020-08-27 RX ADMIN — OXYCODONE HYDROCHLORIDE 5 MILLIGRAM(S): 5 TABLET ORAL at 19:02

## 2020-08-27 NOTE — PROGRESS NOTE ADULT - ASSESSMENT
EVAN 1/7/19: no ALINA thrombus, unable to assess LV sys fx  echo 12/7/18: EF 71%, nl LV sys fx, mild diastolic dysfx     a/p    60 year old female with hx of D CHF, HTN, CAD s/p PCI, Afib/ aflutter, s/p Aflutter Ablation 12/2019, COPD, DM2, Anxiety, , raynaud phenomenon presenting with weakness, SOB , hyperkalemia.      1. Dyspnea, Resp failure  -secondary to chronic lung disease, COPD, volume overload  -pulm f/u   -covid 19 negative  -no acs , cv stable   -ecg with known RBBB, new twi noted, hyperkalemia also noted on admission  -echo with normal LVEF, mild diastolic dysfunction   -s/p PO Prednisone  -on Duoneb, Symbicort, Spiriva and Theophylline.    2. CAD s/p PCI   -stable, no cp  -c/w ASA, statin  -echo noted above     3. Afib/aflutter s/p  Aflutter Ablation 12/2019  -in NSR, cw cardizem  mg daily  -CHADsVac=2, c/w eliquis     4. Acute on Chronic Diastolic CHF   -dyspnea mostly secondary to copd  -intermittently on bipap   -echo with normal lVEF   -Restarted on IV Lasix, but held for worsening Cr and metabolic alkalosis   -creat stable, LE edema improved,   -on bumex changed to  IVp 1 mg BID to achieve at least 2L of UOP daily   -closely monitor bicarb, creat     dvt ppx

## 2020-08-27 NOTE — PROGRESS NOTE ADULT - SUBJECTIVE AND OBJECTIVE BOX
CARDIOLOGY FOLLOW UP - Dr. Brunson    CC unchanged complaints  no acute events   h/h noted       PHYSICAL EXAM:  T(C): 36.7 (08-26-20 @ 23:34), Max: 36.7 (08-26-20 @ 23:34)  HR: 80 (08-27-20 @ 10:26) (78 - 93)  BP: 132/66 (08-26-20 @ 23:34) (132/66 - 140/80)  RR: 20 (08-26-20 @ 23:34) (20 - 20)  SpO2: 98% (08-27-20 @ 10:26) (97% - 100%)  Wt(kg): --  I&O's Summary    26 Aug 2020 07:01  -  27 Aug 2020 07:00  --------------------------------------------------------  IN: 320 mL / OUT: 1025 mL / NET: -705 mL    27 Aug 2020 07:01  -  27 Aug 2020 12:28  --------------------------------------------------------  IN: 0 mL / OUT: 350 mL / NET: -350 mL        Appearance: Normal	  Cardiovascular: Normal S1 S2,RRR, No JVD, No murmurs  Respiratory: diminished   Gastrointestinal:  Soft, Non-tender, + BS	  Extremities: bl le edema         MEDICATIONS  (STANDING):  acetaminophen  IVPB .. 1000 milliGRAM(s) IV Intermittent once  albuterol/ipratropium for Nebulization 3 milliLiter(s) Nebulizer every 6 hours  apixaban 5 milliGRAM(s) Oral every 12 hours  ascorbic acid 500 milliGRAM(s) Oral daily  aspirin enteric coated 81 milliGRAM(s) Oral daily  atorvastatin 80 milliGRAM(s) Oral at bedtime  azithromycin   Tablet 250 milliGRAM(s) Oral <User Schedule>  Biotene Dry Mouth Oral Rinse 5 milliLiter(s) Swish and Spit two times a day  bisacodyl Suppository 10 milliGRAM(s) Rectal daily  budesonide 160 MICROgram(s)/formoterol 4.5 MICROgram(s) Inhaler 2 Puff(s) Inhalation two times a day  buMETAnide Injectable 1 milliGRAM(s) IV Push two times a day  chlorhexidine 2% Cloths 1 Application(s) Topical daily  cholecalciferol 2000 Unit(s) Oral daily  dextrose 5%. 1000 milliLiter(s) (50 mL/Hr) IV Continuous <Continuous>  dextrose 50% Injectable 25 Gram(s) IV Push once  diltiazem    milliGRAM(s) Oral daily  ferrous    sulfate 325 milliGRAM(s) Oral two times a day  insulin glargine Injectable (LANTUS) 20 Unit(s) SubCutaneous at bedtime  insulin lispro (HumaLOG) corrective regimen sliding scale   SubCutaneous three times a day before meals  insulin lispro (HumaLOG) corrective regimen sliding scale   SubCutaneous at bedtime  insulin lispro Injectable (HumaLOG) 5 Unit(s) SubCutaneous three times a day with meals  lactulose Syrup 15 Gram(s) Oral two times a day  montelukast 10 milliGRAM(s) Oral daily  multivitamin 1 Tablet(s) Oral daily  pantoprazole    Tablet 40 milliGRAM(s) Oral before breakfast  polyethylene glycol 3350 17 Gram(s) Oral daily  senna 2 Tablet(s) Oral at bedtime  theophylline ER (24 Hour) 400 milliGRAM(s) Oral daily  tiotropium 18 MICROgram(s) Capsule 1 Capsule(s) Inhalation daily      TELEMETRY: 	    ECG:  	  RADIOLOGY:   DIAGNOSTIC TESTING:  [ ] Echocardiogram:  [ ]  Catheterization:  [ ] Stress Test:    OTHER: 	    LABS:	 	                            6.9    6.49  )-----------( 132      ( 27 Aug 2020 07:17 )             24.5     08-26    138  |  91<L>  |  27<H>  ----------------------------<  123<H>  4.2   |  36<H>  |  0.93    Ca    9.5      26 Aug 2020 06:39  Phos  3.6     08-26  Mg     2.0     08-26

## 2020-08-27 NOTE — PROGRESS NOTE ADULT - PROBLEM SELECTOR PLAN 3
A1C 7.5. Lantus and premeal insulin. - monitor. Hold premeal if patient skips meals.  FS approx acceptable

## 2020-08-27 NOTE — PROGRESS NOTE ADULT - PROBLEM SELECTOR PLAN 5
improving w diuresis  due to burst bulla, peripheral edema. erythema and tenderness but no tracking, no wbc or fever. topical wound mgmt at this time - monitor off abx. pain control w oxy  podiatry follow up appreciated  dispo to acute rehab

## 2020-08-27 NOTE — PROGRESS NOTE ADULT - ASSESSMENT
60F w COPD on 3LNC (?pending transplant list at [facility]), former smoker, CAD s/p stent (2016), afib on eliquis, uncontrolled DM2, raynaud phenomenon and recent hospitalization at [facility] for altered mental status and [facility] aw COPD exacerbation, LE swelling, weakness.

## 2020-08-27 NOTE — PROGRESS NOTE ADULT - SUBJECTIVE AND OBJECTIVE BOX
[facility] Division of Hospital Medicine  Dileep Salomon MD  Pager (M-F, 8A-5P): [phone]  Other Times:  [phone]    Patient is a 60y old  Female who presents with a chief complaint of 60F p/w generalized weakness and difficulty walking (27 Aug 2020 12:27)    SUBJECTIVE / OVERNIGHT EVENTS: foot pain is improving with diuresis. pt trying to reduce PO fluid intake  ADDITIONAL REVIEW OF SYSTEMS:    MEDICATIONS  (STANDING):  acetaminophen  IVPB .. 1000 milliGRAM(s) IV Intermittent once  albuterol/ipratropium for Nebulization 3 milliLiter(s) Nebulizer every 6 hours  apixaban 5 milliGRAM(s) Oral every 12 hours  ascorbic acid 500 milliGRAM(s) Oral daily  aspirin enteric coated 81 milliGRAM(s) Oral daily  atorvastatin 80 milliGRAM(s) Oral at bedtime  azithromycin   Tablet 250 milliGRAM(s) Oral <User Schedule>  Biotene Dry Mouth Oral Rinse 5 milliLiter(s) Swish and Spit two times a day  bisacodyl Suppository 10 milliGRAM(s) Rectal daily  budesonide 160 MICROgram(s)/formoterol 4.5 MICROgram(s) Inhaler 2 Puff(s) Inhalation two times a day  buMETAnide Injectable 1 milliGRAM(s) IV Push two times a day  chlorhexidine 2% Cloths 1 Application(s) Topical daily  cholecalciferol 2000 Unit(s) Oral daily  dextrose 5%. 1000 milliLiter(s) (50 mL/Hr) IV Continuous <Continuous>  dextrose 50% Injectable 25 Gram(s) IV Push once  diltiazem    milliGRAM(s) Oral daily  ferrous    sulfate 325 milliGRAM(s) Oral two times a day  insulin glargine Injectable (LANTUS) 20 Unit(s) SubCutaneous at bedtime  insulin lispro (HumaLOG) corrective regimen sliding scale   SubCutaneous three times a day before meals  insulin lispro (HumaLOG) corrective regimen sliding scale   SubCutaneous at bedtime  insulin lispro Injectable (HumaLOG) 5 Unit(s) SubCutaneous three times a day with meals  lactulose Syrup 15 Gram(s) Oral two times a day  montelukast 10 milliGRAM(s) Oral daily  multivitamin 1 Tablet(s) Oral daily  pantoprazole    Tablet 40 milliGRAM(s) Oral before breakfast  polyethylene glycol 3350 17 Gram(s) Oral daily  senna 2 Tablet(s) Oral at bedtime  theophylline ER (24 Hour) 400 milliGRAM(s) Oral daily  tiotropium 18 MICROgram(s) Capsule 1 Capsule(s) Inhalation daily    MEDICATIONS  (PRN):  glucagon  Injectable 1 milliGRAM(s) IntraMuscular once PRN Glucose LESS THAN 70 milligrams/deciliter  guaiFENesin   Syrup  (Sugar-Free) 100 milliGRAM(s) Oral every 6 hours PRN Cough  oxyCODONE    IR 5 milliGRAM(s) Oral every 6 hours PRN Severe Pain (7 - 10)  traMADol 25 milliGRAM(s) Oral every 6 hours PRN Mild Pain (1 - 3)      CAPILLARY BLOOD GLUCOSE      POCT Blood Glucose.: 111 mg/dL (27 Aug 2020 13:19)  POCT Blood Glucose.: 98 mg/dL (27 Aug 2020 10:28)  POCT Blood Glucose.: 185 mg/dL (26 Aug 2020 21:33)  POCT Blood Glucose.: 105 mg/dL (26 Aug 2020 19:39)  POCT Blood Glucose.: 82 mg/dL (26 Aug 2020 18:59)    I&O's Summary    26 Aug 2020 07:01  -  27 Aug 2020 07:00  --------------------------------------------------------  IN: 320 mL / OUT: 1025 mL / NET: -705 mL    27 Aug 2020 07:01  -  27 Aug 2020 14:05  --------------------------------------------------------  IN: 0 mL / OUT: 350 mL / NET: -350 mL        PHYSICAL EXAM:  Vital Signs Last 24 Hrs  T(C): 36.7 (26 Aug 2020 23:34), Max: 36.7 (26 Aug 2020 23:34)  T(F): 98 (26 Aug 2020 23:34), Max: 98 (26 Aug 2020 23:34)  HR: 80 (27 Aug 2020 10:26) (78 - 93)  BP: 132/66 (26 Aug 2020 23:34) (132/66 - 140/80)  BP(mean): --  RR: 20 (26 Aug 2020 23:34) (20 - 20)  SpO2: 98% (27 Aug 2020 10:26) (97% - 100%)  CONSTITUTIONAL: mild respiratory distress, on NIV  EYES: conjunctiva and sclera clear  ENMT: Moist oral mucosa, no pharyngeal injection or exudates; normal dentition  RESPIRATORY: Normal respiratory effort; lungs are clear to auscultation bilaterally, no wheezes but decreased sounds overall  CARDIOVASCULAR: Regular rate and rhythm, normal S1 and S2, no murmur/rub/gallop; 2+ dependent b/l LE edema beyond knee  ABDOMEN: Nontender to palpation, normoactive bowel sounds, no rebound/guarding;   PSYCH: A+O to person, place, and time; calm  NEUROLOGY: grossly nonfocal   SKIN: No rashes; no palpable lesions. L foot w/ dorsal burst bulla covered with dressing, visible erythema around edges, no tracking up leg. tender to palpation. approx 5-6cm in diameter distal dorsal foot     LABS:                        6.9    6.49  )-----------( 132      ( 27 Aug 2020 07:17 )             24.5     08-26    138  |  91<L>  |  27<H>  ----------------------------<  123<H>  4.2   |  36<H>  |  0.93    Ca    9.5      26 Aug 2020 06:39  Phos  3.6     08-26  Mg     2.0     08-26      RADIOLOGY & ADDITIONAL TESTS:  Results Reviewed:   Imaging Personally Reviewed:  Electrocardiogram Personally Reviewed:    COORDINATION OF CARE: cards recs noted  Care Discussed with Consultants/Other Providers [Y/N]:  Prior or Outpatient Records Reviewed [Y/N]:

## 2020-08-27 NOTE — PROGRESS NOTE ADULT - PROBLEM SELECTOR PLAN 6
Hb slowly drifting down since admission - now around 7  pt notes she has history of B12 def - B12 level checked and is normal  suspecting additional causes - iron def, phlebotomy +/- AoCD  at Hb 7 r/b of transfusion discussed and patient wishes to avoid transfusion and proceed with conservative mgmt  reviewed labs B12, ferritin, LDH, retic. iron/tibc still pending. Ferritin is low - will add vit c/iron presumptively. Retic also inadequate. No evidence of hemolysis  if continues to be an issue will consider heme eval or referral  there is no evidence of bleeding

## 2020-08-27 NOTE — PROGRESS NOTE ADULT - PROBLEM SELECTOR PLAN 2
pt is closer to net even or maybe slightly net negative given oral intake that is not documented - supported by increasing weight  per pulm - goal for 1-2L net negative - changed to IV BID diuresis to achieve  daily BMP, monitor renal function and electrolytes - BMP ordered for today & tomorrow  improving edema is helping with foot pain

## 2020-08-28 LAB
ANION GAP SERPL CALC-SCNC: 8 MMOL/L — SIGNIFICANT CHANGE UP (ref 5–17)
BUN SERPL-MCNC: 27 MG/DL — HIGH (ref 7–23)
CALCIUM SERPL-MCNC: 10 MG/DL — SIGNIFICANT CHANGE UP (ref 8.4–10.5)
CHLORIDE SERPL-SCNC: 92 MMOL/L — LOW (ref 96–108)
CO2 SERPL-SCNC: 40 MMOL/L — HIGH (ref 22–31)
CREAT SERPL-MCNC: 1.06 MG/DL — SIGNIFICANT CHANGE UP (ref 0.5–1.3)
GLUCOSE BLDC GLUCOMTR-MCNC: 114 MG/DL — HIGH (ref 70–99)
GLUCOSE BLDC GLUCOMTR-MCNC: 159 MG/DL — HIGH (ref 70–99)
GLUCOSE BLDC GLUCOMTR-MCNC: 174 MG/DL — HIGH (ref 70–99)
GLUCOSE BLDC GLUCOMTR-MCNC: 91 MG/DL — SIGNIFICANT CHANGE UP (ref 70–99)
GLUCOSE SERPL-MCNC: 125 MG/DL — HIGH (ref 70–99)
HCT VFR BLD CALC: 24.7 % — LOW (ref 34.5–45)
HGB BLD-MCNC: 7 G/DL — CRITICAL LOW (ref 11.5–15.5)
MAGNESIUM SERPL-MCNC: 2.1 MG/DL — SIGNIFICANT CHANGE UP (ref 1.6–2.6)
MCHC RBC-ENTMCNC: 23.8 PG — LOW (ref 27–34)
MCHC RBC-ENTMCNC: 28.3 GM/DL — LOW (ref 32–36)
MCV RBC AUTO: 84 FL — SIGNIFICANT CHANGE UP (ref 80–100)
NRBC # BLD: 0 /100 WBCS — SIGNIFICANT CHANGE UP (ref 0–0)
PLATELET # BLD AUTO: 133 K/UL — LOW (ref 150–400)
POTASSIUM SERPL-MCNC: 4.3 MMOL/L — SIGNIFICANT CHANGE UP (ref 3.5–5.3)
POTASSIUM SERPL-SCNC: 4.3 MMOL/L — SIGNIFICANT CHANGE UP (ref 3.5–5.3)
RBC # BLD: 2.94 M/UL — LOW (ref 3.8–5.2)
RBC # FLD: 15.3 % — HIGH (ref 10.3–14.5)
SODIUM SERPL-SCNC: 140 MMOL/L — SIGNIFICANT CHANGE UP (ref 135–145)
WBC # BLD: 7.37 K/UL — SIGNIFICANT CHANGE UP (ref 3.8–10.5)
WBC # FLD AUTO: 7.37 K/UL — SIGNIFICANT CHANGE UP (ref 3.8–10.5)

## 2020-08-28 PROCEDURE — 99233 SBSQ HOSP IP/OBS HIGH 50: CPT

## 2020-08-28 PROCEDURE — 99232 SBSQ HOSP IP/OBS MODERATE 35: CPT

## 2020-08-28 RX ORDER — CEFAZOLIN SODIUM 1 G
1000 VIAL (EA) INJECTION ONCE
Refills: 0 | Status: COMPLETED | OUTPATIENT
Start: 2020-08-28 | End: 2020-08-28

## 2020-08-28 RX ORDER — CEFAZOLIN SODIUM 1 G
VIAL (EA) INJECTION
Refills: 0 | Status: DISCONTINUED | OUTPATIENT
Start: 2020-08-28 | End: 2020-09-07

## 2020-08-28 RX ORDER — BUMETANIDE 0.25 MG/ML
2 INJECTION INTRAMUSCULAR; INTRAVENOUS
Refills: 0 | Status: DISCONTINUED | OUTPATIENT
Start: 2020-08-28 | End: 2020-09-02

## 2020-08-28 RX ORDER — INSULIN LISPRO 100/ML
4 VIAL (ML) SUBCUTANEOUS
Refills: 0 | Status: DISCONTINUED | OUTPATIENT
Start: 2020-08-28 | End: 2020-09-06

## 2020-08-28 RX ORDER — MUPIROCIN 20 MG/G
1 OINTMENT TOPICAL
Refills: 0 | Status: DISCONTINUED | OUTPATIENT
Start: 2020-08-28 | End: 2020-09-14

## 2020-08-28 RX ORDER — INSULIN GLARGINE 100 [IU]/ML
16 INJECTION, SOLUTION SUBCUTANEOUS AT BEDTIME
Refills: 0 | Status: DISCONTINUED | OUTPATIENT
Start: 2020-08-28 | End: 2020-09-06

## 2020-08-28 RX ORDER — CEFAZOLIN SODIUM 1 G
1000 VIAL (EA) INJECTION EVERY 8 HOURS
Refills: 0 | Status: DISCONTINUED | OUTPATIENT
Start: 2020-08-28 | End: 2020-09-07

## 2020-08-28 RX ADMIN — PANTOPRAZOLE SODIUM 40 MILLIGRAM(S): 20 TABLET, DELAYED RELEASE ORAL at 06:10

## 2020-08-28 RX ADMIN — AZITHROMYCIN 250 MILLIGRAM(S): 500 TABLET, FILM COATED ORAL at 18:31

## 2020-08-28 RX ADMIN — MONTELUKAST 10 MILLIGRAM(S): 4 TABLET, CHEWABLE ORAL at 13:03

## 2020-08-28 RX ADMIN — INSULIN GLARGINE 16 UNIT(S): 100 INJECTION, SOLUTION SUBCUTANEOUS at 21:55

## 2020-08-28 RX ADMIN — POLYETHYLENE GLYCOL 3350 17 GRAM(S): 17 POWDER, FOR SOLUTION ORAL at 13:04

## 2020-08-28 RX ADMIN — Medication 5 MILLILITER(S): at 06:10

## 2020-08-28 RX ADMIN — Medication 2000 UNIT(S): at 13:05

## 2020-08-28 RX ADMIN — OXYCODONE HYDROCHLORIDE 5 MILLIGRAM(S): 5 TABLET ORAL at 11:40

## 2020-08-28 RX ADMIN — Medication 325 MILLIGRAM(S): at 09:00

## 2020-08-28 RX ADMIN — Medication 100 MILLIGRAM(S): at 14:33

## 2020-08-28 RX ADMIN — ATORVASTATIN CALCIUM 80 MILLIGRAM(S): 80 TABLET, FILM COATED ORAL at 21:55

## 2020-08-28 RX ADMIN — OXYCODONE HYDROCHLORIDE 5 MILLIGRAM(S): 5 TABLET ORAL at 18:12

## 2020-08-28 RX ADMIN — Medication 3 MILLILITER(S): at 17:49

## 2020-08-28 RX ADMIN — APIXABAN 5 MILLIGRAM(S): 2.5 TABLET, FILM COATED ORAL at 17:51

## 2020-08-28 RX ADMIN — OXYCODONE HYDROCHLORIDE 5 MILLIGRAM(S): 5 TABLET ORAL at 19:32

## 2020-08-28 RX ADMIN — Medication 180 MILLIGRAM(S): at 06:10

## 2020-08-28 RX ADMIN — LACTULOSE 15 GRAM(S): 10 SOLUTION ORAL at 17:50

## 2020-08-28 RX ADMIN — Medication 500 MILLIGRAM(S): at 13:04

## 2020-08-28 RX ADMIN — BUMETANIDE 2 MILLIGRAM(S): 0.25 INJECTION INTRAMUSCULAR; INTRAVENOUS at 17:48

## 2020-08-28 RX ADMIN — CHLORHEXIDINE GLUCONATE 1 APPLICATION(S): 213 SOLUTION TOPICAL at 11:00

## 2020-08-28 RX ADMIN — BUDESONIDE AND FORMOTEROL FUMARATE DIHYDRATE 2 PUFF(S): 160; 4.5 AEROSOL RESPIRATORY (INHALATION) at 17:50

## 2020-08-28 RX ADMIN — BUMETANIDE 1 MILLIGRAM(S): 0.25 INJECTION INTRAMUSCULAR; INTRAVENOUS at 06:11

## 2020-08-28 RX ADMIN — Medication 100 MILLIGRAM(S): at 21:55

## 2020-08-28 RX ADMIN — Medication 400 MILLIGRAM(S): at 13:03

## 2020-08-28 RX ADMIN — Medication 3 MILLILITER(S): at 13:03

## 2020-08-28 RX ADMIN — SENNA PLUS 2 TABLET(S): 8.6 TABLET ORAL at 21:56

## 2020-08-28 RX ADMIN — TIOTROPIUM BROMIDE 1 CAPSULE(S): 18 CAPSULE ORAL; RESPIRATORY (INHALATION) at 13:05

## 2020-08-28 RX ADMIN — Medication 81 MILLIGRAM(S): at 13:06

## 2020-08-28 RX ADMIN — Medication 5 MILLILITER(S): at 17:49

## 2020-08-28 RX ADMIN — Medication 3 MILLILITER(S): at 22:59

## 2020-08-28 RX ADMIN — Medication 5 UNIT(S): at 14:31

## 2020-08-28 RX ADMIN — OXYCODONE HYDROCHLORIDE 5 MILLIGRAM(S): 5 TABLET ORAL at 10:45

## 2020-08-28 RX ADMIN — Medication 325 MILLIGRAM(S): at 17:50

## 2020-08-28 RX ADMIN — BUDESONIDE AND FORMOTEROL FUMARATE DIHYDRATE 2 PUFF(S): 160; 4.5 AEROSOL RESPIRATORY (INHALATION) at 06:10

## 2020-08-28 RX ADMIN — APIXABAN 5 MILLIGRAM(S): 2.5 TABLET, FILM COATED ORAL at 06:10

## 2020-08-28 RX ADMIN — Medication 5 UNIT(S): at 09:49

## 2020-08-28 RX ADMIN — OXYCODONE HYDROCHLORIDE 5 MILLIGRAM(S): 5 TABLET ORAL at 17:42

## 2020-08-28 RX ADMIN — Medication 1: at 09:50

## 2020-08-28 RX ADMIN — Medication 3 MILLILITER(S): at 06:10

## 2020-08-28 RX ADMIN — Medication 1 TABLET(S): at 13:05

## 2020-08-28 NOTE — PROGRESS NOTE ADULT - SUBJECTIVE AND OBJECTIVE BOX
Patient is a 60y old  Female who presents with a chief complaint of 60F p/w generalized weakness and difficulty walking (28 Aug 2020 16:43)       INTERVAL HPI/OVERNIGHT EVENTS:  Patient seen and evaluated at bedside.  Pt is resting comfortable in NAD. Denies N/V/F/C.  Pain rated at 10/10    Allergies    No Known Allergies    Intolerances    albuterol (Unknown)      Vital Signs Last 24 Hrs  T(C): 36.6 (28 Aug 2020 08:44), Max: 36.8 (27 Aug 2020 21:14)  T(F): 97.8 (28 Aug 2020 08:44), Max: 98.2 (27 Aug 2020 21:14)  HR: 81 (28 Aug 2020 09:58) (81 - 112)  BP: 131/67 (28 Aug 2020 08:44) (131/62 - 148/63)  BP(mean): --  RR: 24 (28 Aug 2020 08:44) (20 - 28)  SpO2: 97% (28 Aug 2020 09:58) (93% - 99%)    LABS:                        7.0    7.37  )-----------( 133      ( 28 Aug 2020 07:20 )             24.7     08-28    140  |  92<L>  |  27<H>  ----------------------------<  125<H>  4.3   |  40<H>  |  1.06    Ca    10.0      28 Aug 2020 07:20  Mg     2.1     08-28          CAPILLARY BLOOD GLUCOSE      POCT Blood Glucose.: 91 mg/dL (28 Aug 2020 18:52)  POCT Blood Glucose.: 114 mg/dL (28 Aug 2020 14:23)  POCT Blood Glucose.: 174 mg/dL (28 Aug 2020 09:45)  POCT Blood Glucose.: 107 mg/dL (27 Aug 2020 21:08)  POCT Blood Glucose.: 131 mg/dL (27 Aug 2020 20:07)  POCT Blood Glucose.: 85 mg/dL (27 Aug 2020 19:33)      Lower Extremity Physical Exam:  Vascular: DP/PT n/p, B/L with known h/o raynauds, CFT <_5 seconds B/L, Temperature gradient _warm to cool, B/L.   Neuro: Epicritic sensation intact to the level of _toes, B/L.  Skin: Postive mycotic toenails x10  Wound #1:   Location: dorsum left forefoot  Size: 6cm diameter  Depth: superficial  Wound bed: bullae  Drainage:  clear serous fluid/fluctuant  Odor: none  Periwound: no clinical signs of infection  Etiology: bullae/dm  Erythema increased in last two days to the site of the blister     Location: dorsum right forefoot  Size: 5cm diameter  Depth: superficial  Wound bed: bullae  Drainage:  clear serous fluid/fluctuant  Odor: none  Periwound: no clinical signs of infection  Etiology: bullae/dm

## 2020-08-28 NOTE — CONSULT NOTE ADULT - ATTENDING COMMENTS
Justine Colon  Pager: [phone]. If no response or past 5 pm call [phone].    Please call ID service for questions over weekend at [phone].

## 2020-08-28 NOTE — PROGRESS NOTE ADULT - ASSESSMENT
60F w COPD on 3LNC (?pending transplant list at [facility]), former smoker, CAD s/p stent (2016), afib on eliquis, uncontrolled DM2, raynaud phenomenon and recent hospitalization at [facility] for altered mental status and [facility] aw COPD exacerbation, LE swelling, weakness.

## 2020-08-28 NOTE — PROGRESS NOTE ADULT - ASSESSMENT
EVAN 1/7/19: no ALINA thrombus, unable to assess LV sys fx  echo 12/7/18: EF 71%, nl LV sys fx, mild diastolic dysfx     a/p    60 year old female with hx of D CHF, HTN, CAD s/p PCI, Afib/ aflutter, s/p Aflutter Ablation 12/2019, COPD, DM2, Anxiety, , raynaud phenomenon presenting with weakness, SOB , hyperkalemia.      1. Dyspnea, Resp failure  -secondary to chronic lung disease, COPD, volume overload  -pulm f/u   -covid 19 negative  -no acs , cv stable   -ecg with known RBBB, new twi noted, hyperkalemia also noted on admission  -echo with normal LVEF, mild diastolic dysfunction   -s/p PO Prednisone  -on Duoneb, Symbicort, Spiriva and Theophylline.    2. CAD s/p PCI   -stable, no cp  -c/w ASA, statin  -echo noted above     3. Afib/aflutter s/p  Aflutter Ablation 12/2019  -in NSR, cw cardizem  mg daily  -CHADsVac=2, c/w eliquis     4. Acute on Chronic Diastolic CHF   -dyspnea mostly secondary to copd  -intermittently on bipap   -echo with normal lVEF   -Restarted on IV Lasix, but held for worsening Cr and metabolic alkalosis   -creat stable  -on bumex  IVp 1 mg BID to achieve at least 2L of UOP daily   -closely monitor bicarb, creat     5. Anemia  -ref PRBCs, no active signs of bleeding. on a/c, asa   dvt ppx        DCP per primary [place]

## 2020-08-28 NOTE — CONSULT NOTE ADULT - SUBJECTIVE AND OBJECTIVE BOX
Patient is a 60y old  Female who presents with a chief complaint of 60F p/w generalized weakness and difficulty walking (28 Aug 2020 13:06)      HPI:  60F w/ end stage COPD on 3LNC (pending transplant list at [facility]), former smoker, CAD s/p stent (2016), afib on eliquis, uncontrolled DM2, raynaud phenomenon and recent hospitalization at [facility] for altered mental status and ARTURO presents to [facility] for evaluation of generalized weakness and difficulty walking. Patient reports that she was recently hospitalized at [facility] from 7/25-7/31 for confusion and ARTURO. At that time, she was told by her brother (lives downstairs in home) that she was not herself and did not recognize him which is why she was sent to hospital. Per chart, Dr. Mathew (PCP) has chart notes regarding possible hallucinations. While at [facility] she had head CT reported to be normal and she declined MR brain. Her Arturo (Cr increased to 1.5) was reported to resolve with IV fluid. She was discharged to home 1d prior to presentation to [facility] and reports since discharge she has had generalized weakness, inability to walk due to leg heaviness/weakness L>R, and sensation that she has retained fluid in her legs. At baseline she walks with a walker and per chart review her PCP was concerned this month that the patient was developing steroid-induced myopathy and planned on having patient evaluated by neurology as well as obtain MRI at [facility]. No reported fall, head trauma, seizure, paralysis, loss of sensation in extremities or saddle anesthesia, fever, chills, cp, cough, worsening baseline sob (uses 3LNC), n/v/d, inability to urinate or urinary incontinence, constipation or bowel incontinence, or rash (has bruised on arm form needle stick from recent hospitalization).  DENIES allergy to albuterol- never rash, no anaphylaxis per patient  In the ED, VS 97.5, 138/76, 69, 18 98%3LNC (01 Aug 2020 22:05)  Above reviewed:   Pt admitted almost a month ago, initially with weakness with concern for steroid myopathy, her diuresis and COPD were being managed, developed blisters due to lower extremity edema, being managed, ID called for evaluation of cellulitis.       PAST MEDICAL & SURGICAL HISTORY:  Atrial fibrillation  Hyperlipemia  Chronic sinusitis  Raynaud phenomenon  HTN (hypertension)  DM (diabetes mellitus)  Claustrophobia  COPD (chronic obstructive pulmonary disease)  S/P tonsillectomy      REVIEW OF SYSTEMS    General: Denies any chills. Fevers absent    Skin: No rash  	  Ophthalmologic: Denies any discharge, redness.  	  ENT: No nasal congestion or throat pain.     Respiratory and Thorax: some cough. chronic shortness of breath, on BiPAP   	  Cardiovascular: No chest pain,     Gastrointestinal: No nausea, abdominal pain or diarrhea.    Genitourinary: No dysuria, frequency. No flank pain.    Musculoskeletal: No joint swelling.    Neurological: generalized weakness.    Psychiatric: No hallucinations	    Extremities: b/l lower extremity swelling     Endocrine: No polyuria or polydipsia     Allergic/Immunologic: No hives       Social history:  Lives with brother in private home. Former smoker.     FAMILY HISTORY:  FH: MI (myocardial infarction)  FH: CABG (coronary artery bypass surgery)      Allergies  No Known Allergies      Intolerances  albuterol (Unknown)      Antimicrobials:  azithromycin   Tablet 250 milliGRAM(s) Oral <User Schedule>  ceFAZolin   IVPB 1000 milliGRAM(s) IV Intermittent every 8 hours        Vital Signs Last 24 Hrs  T(C): 36.6 (28 Aug 2020 08:44), Max: 36.8 (27 Aug 2020 21:14)  T(F): 97.8 (28 Aug 2020 08:44), Max: 98.2 (27 Aug 2020 21:14)  HR: 81 (28 Aug 2020 09:58) (81 - 112)  BP: 131/67 (28 Aug 2020 08:44) (131/62 - 148/63)  BP(mean): --  RR: 24 (28 Aug 2020 08:44) (20 - 28)  SpO2: 97% (28 Aug 2020 09:58) (93% - 100%)      PHYSICAL EXAM: Patient in no acute distress.    Constitutional: Comfortable. Awake and alert    Eyes: No discharge or conjunctival injection    ENT: No pharyngeal exudate or erythema.    Neck: Supple,    Respiratory: Decreased air entry bilaterally.    Cardiovascular: S1 S2 wnl,     Gastrointestinal: Soft BS(+) no tenderness, non distended.    Genitourinary: No CVA tenderness     Extremities: b/l le ++ edema. Lt LE with slight warmth and erythema     Neurological: AAO X 3. No grossly focal deficits.    Skin: No rash, lt foot 2nd toe with ischemic looking black spot, superficial ulcers from open blisters, do not look infected.     Musculoskeletal: No joint swelling.    Psychiatric: Affect normal.                            7.0    7.37  )-----------( 133      ( 28 Aug 2020 07:20 )             24.7       08-28    140  |  92<L>  |  27<H>  ----------------------------<  125<H>  4.3   |  40<H>  |  1.06    Ca    10.0      28 Aug 2020 07:20  Mg     2.1     08-28      Urinalysis + Microscopic Examination (08.01.20 @ 16:18)    Blood, Urine: Negative    Ketone - Urine: Negative    Bilirubin: Negative    Color: Light Yellow    Glucose Qualitative, Urine: 500 mg/dL    Leukocyte Esterase Concentration: Negative    Nitrite: Negative    Protein, Urine: 30 mg/dL    pH Urine: 6.0    Urine Appearance: Clear    Urobilinogen: Negative    Specific Gravity: 1.020    Red Blood Cell - Urine: 2 /hpf    White Blood Cell - Urine: 3 /HPF    Epithelial Cells: 1 /hpf    Hyaline Casts: 3 /lpf    Bacteria: Negative      COVID-19 PCR . (08.20.20 @ 18:35)    COVID-19 PCR: NotDetec      Culture - Urine (08.01.20 @ 21:39)    Specimen Source: .Urine Clean Catch (Midstream)    Culture Results:   <10,000 CFU/mL Normal Urogenital Samaria      Radiology: Imaging reviewed and visualized personally [ x]    < from: Xray Chest 1 View- PORTABLE-Routine (08.01.20 @ 16:44) >  IMPRESSION:    The lungs are clear.  Heart size cannot be accurately assessed in this projection.  No acute bone abnormality.      < from: Xray Abdomen 1 View PORTABLE -Urgent (08.08.20 @ 23:41) >  Impression: Nonobstructive bowel gas pattern.

## 2020-08-28 NOTE — PROGRESS NOTE ADULT - SUBJECTIVE AND OBJECTIVE BOX
CARDIOLOGY FOLLOW UP - Dr. Brunson    CC no new complaints       PHYSICAL EXAM:  T(C): 36.6 (08-28-20 @ 08:44), Max: 36.8 (08-27-20 @ 21:14)  HR: 81 (08-28-20 @ 09:58) (81 - 112)  BP: 131/67 (08-28-20 @ 08:44) (127/65 - 148/63)  RR: 24 (08-28-20 @ 08:44) (20 - 28)  SpO2: 97% (08-28-20 @ 09:58) (93% - 100%)  Wt(kg): --  I&O's Summary    27 Aug 2020 07:01  -  28 Aug 2020 07:00  --------------------------------------------------------  IN: 240 mL / OUT: 1400 mL / NET: -1160 mL    28 Aug 2020 07:01  -  28 Aug 2020 13:07  --------------------------------------------------------  IN: 0 mL / OUT: 800 mL / NET: -800 mL        Appearance: Normal	  Cardiovascular: Normal S1 S2,RRR, No JVD, No murmurs  Respiratory: diminished   Gastrointestinal:  Soft, Non-tender, + BS	  Extremities: le edema, blister       MEDICATIONS  (STANDING):  acetaminophen  IVPB .. 1000 milliGRAM(s) IV Intermittent once  albuterol/ipratropium for Nebulization 3 milliLiter(s) Nebulizer every 6 hours  apixaban 5 milliGRAM(s) Oral every 12 hours  ascorbic acid 500 milliGRAM(s) Oral daily  aspirin enteric coated 81 milliGRAM(s) Oral daily  atorvastatin 80 milliGRAM(s) Oral at bedtime  azithromycin   Tablet 250 milliGRAM(s) Oral <User Schedule>  Biotene Dry Mouth Oral Rinse 5 milliLiter(s) Swish and Spit two times a day  bisacodyl Suppository 10 milliGRAM(s) Rectal daily  budesonide 160 MICROgram(s)/formoterol 4.5 MICROgram(s) Inhaler 2 Puff(s) Inhalation two times a day  buMETAnide Injectable 1 milliGRAM(s) IV Push two times a day  chlorhexidine 2% Cloths 1 Application(s) Topical daily  cholecalciferol 2000 Unit(s) Oral daily  dextrose 5%. 1000 milliLiter(s) (50 mL/Hr) IV Continuous <Continuous>  dextrose 50% Injectable 25 Gram(s) IV Push once  diltiazem    milliGRAM(s) Oral daily  ferrous    sulfate 325 milliGRAM(s) Oral two times a day  insulin glargine Injectable (LANTUS) 20 Unit(s) SubCutaneous at bedtime  insulin lispro (HumaLOG) corrective regimen sliding scale   SubCutaneous three times a day before meals  insulin lispro (HumaLOG) corrective regimen sliding scale   SubCutaneous at bedtime  insulin lispro Injectable (HumaLOG) 5 Unit(s) SubCutaneous three times a day with meals  lactulose Syrup 15 Gram(s) Oral two times a day  montelukast 10 milliGRAM(s) Oral daily  multivitamin 1 Tablet(s) Oral daily  pantoprazole    Tablet 40 milliGRAM(s) Oral before breakfast  polyethylene glycol 3350 17 Gram(s) Oral daily  senna 2 Tablet(s) Oral at bedtime  theophylline ER (24 Hour) 400 milliGRAM(s) Oral daily  tiotropium 18 MICROgram(s) Capsule 1 Capsule(s) Inhalation daily      TELEMETRY: 	    ECG:  	  RADIOLOGY:   DIAGNOSTIC TESTING:  [ ] Echocardiogram:  [ ]  Catheterization:  [ ] Stress Test:    OTHER: 	    LABS:	 	                            7.0    7.37  )-----------( 133      ( 28 Aug 2020 07:20 )             24.7     08-28    140  |  92<L>  |  27<H>  ----------------------------<  125<H>  4.3   |  40<H>  |  1.06    Ca    10.0      28 Aug 2020 07:20  Mg     2.1     08-28

## 2020-08-28 NOTE — PROGRESS NOTE ADULT - ATTENDING COMMENTS
d/c to acute rehab once changed to po diuretics - potentially can change tomorrow Plan discussed w pt's PMD Dr Nadia Mathew.

## 2020-08-28 NOTE — CONSULT NOTE ADULT - ASSESSMENT
60F w/ end stage COPD on 3LNC (pending transplant list at [facility]), former smoker, CAD s/p stent (2016), afib on eliquis, uncontrolled DM2, raynaud phenomenon and recent hospitalization at [facility] for altered mental status and AURORA presents to [facility] for evaluation of generalized weakness and difficulty walking.    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   No fever or leucocytosis   Pt with recent administration of steroids, 60F w/ end stage COPD on 3LNC (pending transplant list at [facility]), former smoker, CAD s/p stent (2016), afib on eliquis, uncontrolled DM2, raynaud phenomenon and recent hospitalization at [facility] for altered mental status and AURORA presents to [facility] for evaluation of generalized weakness and difficulty walking.    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   No fever or leucocytosis   Pt with recent administration of steroids, some immunosuppression.   Possible LLE cellulitis  b/l foot ulcers.       Plan:   can start cefazolin 1 gm iv q8h for now  can switch to po when ready for discharge   leg elevation   compression stockings or ace wrap if tolerated   wound care for b/l foot ulcers

## 2020-08-28 NOTE — PROGRESS NOTE ADULT - SUBJECTIVE AND OBJECTIVE BOX
CHIEF COMPLAINT: Weakness and dyspnea    Interval Events: No acute events. Feels the same. Continues to have severe dyspnea, leg pain, and leg swelling. No fevers, chills, nausea, emesis, or diarrhea.     REVIEW OF SYSTEMS:  See above. ROS otherwise negative.     OBJECTIVE:  ICU Vital Signs Last 24 Hrs  T(C): 36.6 (28 Aug 2020 08:44), Max: 36.8 (27 Aug 2020 21:14)  T(F): 97.8 (28 Aug 2020 08:44), Max: 98.2 (27 Aug 2020 21:14)  HR: 81 (28 Aug 2020 09:58) (80 - 112)  BP: 131/67 (28 Aug 2020 08:44) (127/65 - 148/63)  BP(mean): --  ABP: --  ABP(mean): --  RR: 24 (28 Aug 2020 08:44) (20 - 28)  SpO2: 97% (28 Aug 2020 09:58) (93% - 100%)        08-27 @ 07:01  -  08-28 @ 07:00  --------------------------------------------------------  IN: 240 mL / OUT: 1400 mL / NET: -1160 mL      CAPILLARY BLOOD GLUCOSE      POCT Blood Glucose.: 174 mg/dL (28 Aug 2020 09:45)      PHYSICAL EXAM:  General: Morbidly obese F sitting in bed, NAD, uncomfortable  HEENT: NC/AT sclerae anicteric  Respiratory: Mildly increased WOB, + accessory muscle use. Diminished breath sounds b/l   Cardiovascular: S1, S2  Abdomen: Soft, + BS  Extremities: WWP, tender to palpation, 4+ pitting edema  Neurological: Awake, alert, follows commands  Psychiatry: Frustrated, but appropriate    HOSPITAL MEDICATIONS:  MEDICATIONS  (STANDING):  acetaminophen  IVPB .. 1000 milliGRAM(s) IV Intermittent once  albuterol/ipratropium for Nebulization 3 milliLiter(s) Nebulizer every 6 hours  apixaban 5 milliGRAM(s) Oral every 12 hours  ascorbic acid 500 milliGRAM(s) Oral daily  aspirin enteric coated 81 milliGRAM(s) Oral daily  atorvastatin 80 milliGRAM(s) Oral at bedtime  azithromycin   Tablet 250 milliGRAM(s) Oral <User Schedule>  Biotene Dry Mouth Oral Rinse 5 milliLiter(s) Swish and Spit two times a day  bisacodyl Suppository 10 milliGRAM(s) Rectal daily  budesonide 160 MICROgram(s)/formoterol 4.5 MICROgram(s) Inhaler 2 Puff(s) Inhalation two times a day  buMETAnide Injectable 1 milliGRAM(s) IV Push two times a day  chlorhexidine 2% Cloths 1 Application(s) Topical daily  cholecalciferol 2000 Unit(s) Oral daily  dextrose 5%. 1000 milliLiter(s) (50 mL/Hr) IV Continuous <Continuous>  dextrose 50% Injectable 25 Gram(s) IV Push once  diltiazem    milliGRAM(s) Oral daily  ferrous    sulfate 325 milliGRAM(s) Oral two times a day  insulin glargine Injectable (LANTUS) 20 Unit(s) SubCutaneous at bedtime  insulin lispro (HumaLOG) corrective regimen sliding scale   SubCutaneous three times a day before meals  insulin lispro (HumaLOG) corrective regimen sliding scale   SubCutaneous at bedtime  insulin lispro Injectable (HumaLOG) 5 Unit(s) SubCutaneous three times a day with meals  lactulose Syrup 15 Gram(s) Oral two times a day  montelukast 10 milliGRAM(s) Oral daily  multivitamin 1 Tablet(s) Oral daily  pantoprazole    Tablet 40 milliGRAM(s) Oral before breakfast  polyethylene glycol 3350 17 Gram(s) Oral daily  senna 2 Tablet(s) Oral at bedtime  theophylline ER (24 Hour) 400 milliGRAM(s) Oral daily  tiotropium 18 MICROgram(s) Capsule 1 Capsule(s) Inhalation daily    MEDICATIONS  (PRN):  glucagon  Injectable 1 milliGRAM(s) IntraMuscular once PRN Glucose LESS THAN 70 milligrams/deciliter  guaiFENesin   Syrup  (Sugar-Free) 100 milliGRAM(s) Oral every 6 hours PRN Cough  oxyCODONE    IR 5 milliGRAM(s) Oral every 6 hours PRN Severe Pain (7 - 10)  traMADol 25 milliGRAM(s) Oral every 6 hours PRN Mild Pain (1 - 3)      LABS:                        7.0    7.37  )-----------( 133      ( 28 Aug 2020 07:20 )             24.7     Hgb Trend: 7.0<--, 6.9<--, 7.0<--, 6.8<--, 7.4<--  08-28    140  |  92<L>  |  27<H>  ----------------------------<  125<H>  4.3   |  40<H>  |  1.06    Ca    10.0      28 Aug 2020 07:20  Mg     2.1     08-28      Creatinine Trend: 1.06<--, 1.03<--, 0.93<--, 0.92<--, 0.87<--, 0.90<--            MICROBIOLOGY:       RADIOLOGY:  [ ] Reviewed and interpreted by me    PULMONARY FUNCTION TESTS:    EKG:

## 2020-08-28 NOTE — PROGRESS NOTE ADULT - PROBLEM SELECTOR PLAN 5
RAULITO garcia. Started on IV Ancef per ID for possible cellulitis. baljeet garcia. Started on IV Ancef per ID for possible cellulitis.

## 2020-08-28 NOTE — PROGRESS NOTE ADULT - ASSESSMENT
61 y/o F w/chronic respiratory failure w/hypoxia and hypercapnia secondary to combination of HFpEF and COPD on home O2 admitted for acute on chronic respiratory failure with hypoxia and hypercapnia likely secondary to COPD exacerbation and acute pulmonary edema secondary to acute on chronic HFpEF. I/O not being recorded correctly, patient reports drinking to 500ml bottles of water daily.     - Patient with chronic respiratory acidosis w/compensation + minor metabolic alkalosis. Would not dose with diamox currently as pH is still in acidotic range and dumping bicarb can send patient into respiratory failure as she will not be able compensate for low pH with improved respiratory CO2 clearance  - Diuresis goal net negative 1-2L, would increase bumex dose to 2mg IV 2x daily  - Strict I/Os (intake not being properly recorded)  - Continue supplemental O2  - Continue bronchodilators, theophyline   - Pain control for lower extremity edema and blisters

## 2020-08-28 NOTE — PROGRESS NOTE ADULT - ASSESSMENT
--Left dm foot bullae secondary to fluid retention    --erythema increased at this time to the left foot, erythema not extending past bulla area, possible cellulitis, adaptic touch and mupirocin applied  --recommend continued daily mupirocin with adaptic and dsd left forefoot daily  --ACE compression bilat if tolerated by patient  --recommend continue podiatric footcare as outpatient with current dpm  --will monitor during this admission

## 2020-08-28 NOTE — PROGRESS NOTE ADULT - PROBLEM SELECTOR PLAN 2
Bumex increased today. Monitor. Bumex increased today. Monitor. Discussed w pulmonary Dr Castro- if bicarb increases, obtain ABG to check ph.

## 2020-08-28 NOTE — PROGRESS NOTE ADULT - PROBLEM SELECTOR PLAN 6
Hb slowly drifting down since admission - now around 7. Anemia likely exacerbated by prolonged hospitalization and frequent blood draws. Low serum iron. Hb slowly drifting down since admission - now around 7. Anemia likely exacerbated by prolonged hospitalization and frequent blood draws. Low serum iron. Pt refusing transfusion. Continue oral iron replacement.

## 2020-08-28 NOTE — PROGRESS NOTE ADULT - SUBJECTIVE AND OBJECTIVE BOX
Patient is a 60y old  Female who presents with a chief complaint of 60F p/w generalized weakness and difficulty walking (28 Aug 2020 14:05)    HPI: Pt continues to be combative, belligerent. C/o dyspnea, and leg swelling.     Vital Signs Last 24 Hrs  T(C): 36.6 (28 Aug 2020 08:44), Max: 36.8 (27 Aug 2020 21:14)  T(F): 97.8 (28 Aug 2020 08:44), Max: 98.2 (27 Aug 2020 21:14)  HR: 81 (28 Aug 2020 09:58) (81 - 112)  BP: 131/67 (28 Aug 2020 08:44) (131/62 - 148/63)  BP(mean): --  RR: 24 (28 Aug 2020 08:44) (20 - 28)  SpO2: 97% (28 Aug 2020 09:58) (93% - 99%)                          7.0    7.37  )-----------( 133      ( 28 Aug 2020 07:20 )             24.7     08-28    140  |  92<L>  |  27<H>  ----------------------------<  125<H>  4.3   |  40<H>  |  1.06    Ca    10.0      28 Aug 2020 07:20  Mg     2.1     08-28    MEDICATIONS  (STANDING):  acetaminophen  IVPB .. 1000 milliGRAM(s) IV Intermittent once  albuterol/ipratropium for Nebulization 3 milliLiter(s) Nebulizer every 6 hours  apixaban 5 milliGRAM(s) Oral every 12 hours  ascorbic acid 500 milliGRAM(s) Oral daily  aspirin enteric coated 81 milliGRAM(s) Oral daily  atorvastatin 80 milliGRAM(s) Oral at bedtime  azithromycin   Tablet 250 milliGRAM(s) Oral <User Schedule>  Biotene Dry Mouth Oral Rinse 5 milliLiter(s) Swish and Spit two times a day  bisacodyl Suppository 10 milliGRAM(s) Rectal daily  budesonide 160 MICROgram(s)/formoterol 4.5 MICROgram(s) Inhaler 2 Puff(s) Inhalation two times a day  buMETAnide Injectable 2 milliGRAM(s) IV Push two times a day  ceFAZolin   IVPB      ceFAZolin   IVPB 1000 milliGRAM(s) IV Intermittent every 8 hours  chlorhexidine 2% Cloths 1 Application(s) Topical daily  cholecalciferol 2000 Unit(s) Oral daily  dextrose 5%. 1000 milliLiter(s) (50 mL/Hr) IV Continuous <Continuous>  dextrose 50% Injectable 25 Gram(s) IV Push once  diltiazem    milliGRAM(s) Oral daily  ferrous    sulfate 325 milliGRAM(s) Oral two times a day  insulin glargine Injectable (LANTUS) 20 Unit(s) SubCutaneous at bedtime  insulin lispro (HumaLOG) corrective regimen sliding scale   SubCutaneous three times a day before meals  insulin lispro (HumaLOG) corrective regimen sliding scale   SubCutaneous at bedtime  insulin lispro Injectable (HumaLOG) 5 Unit(s) SubCutaneous three times a day with meals  lactulose Syrup 15 Gram(s) Oral two times a day  montelukast 10 milliGRAM(s) Oral daily  multivitamin 1 Tablet(s) Oral daily  pantoprazole    Tablet 40 milliGRAM(s) Oral before breakfast  polyethylene glycol 3350 17 Gram(s) Oral daily  senna 2 Tablet(s) Oral at bedtime  theophylline ER (24 Hour) 400 milliGRAM(s) Oral daily  tiotropium 18 MICROgram(s) Capsule 1 Capsule(s) Inhalation daily    MEDICATIONS  (PRN):  glucagon  Injectable 1 milliGRAM(s) IntraMuscular once PRN Glucose LESS THAN 70 milligrams/deciliter  guaiFENesin   Syrup  (Sugar-Free) 100 milliGRAM(s) Oral every 6 hours PRN Cough  oxyCODONE    IR 5 milliGRAM(s) Oral every 6 hours PRN Severe Pain (7 - 10)  traMADol 25 milliGRAM(s) Oral every 6 hours PRN Mild Pain (1 - 3)

## 2020-08-29 LAB
ANION GAP SERPL CALC-SCNC: 8 MMOL/L — SIGNIFICANT CHANGE UP (ref 5–17)
BLD GP AB SCN SERPL QL: NEGATIVE — SIGNIFICANT CHANGE UP
BUN SERPL-MCNC: 24 MG/DL — HIGH (ref 7–23)
CALCIUM SERPL-MCNC: 9.8 MG/DL — SIGNIFICANT CHANGE UP (ref 8.4–10.5)
CHLORIDE SERPL-SCNC: 92 MMOL/L — LOW (ref 96–108)
CO2 SERPL-SCNC: 40 MMOL/L — HIGH (ref 22–31)
CREAT SERPL-MCNC: 0.98 MG/DL — SIGNIFICANT CHANGE UP (ref 0.5–1.3)
GLUCOSE BLDC GLUCOMTR-MCNC: 117 MG/DL — HIGH (ref 70–99)
GLUCOSE BLDC GLUCOMTR-MCNC: 122 MG/DL — HIGH (ref 70–99)
GLUCOSE BLDC GLUCOMTR-MCNC: 134 MG/DL — HIGH (ref 70–99)
GLUCOSE BLDC GLUCOMTR-MCNC: 222 MG/DL — HIGH (ref 70–99)
GLUCOSE SERPL-MCNC: 145 MG/DL — HIGH (ref 70–99)
HCT VFR BLD CALC: 23.5 % — LOW (ref 34.5–45)
HGB BLD-MCNC: 6.7 G/DL — CRITICAL LOW (ref 11.5–15.5)
MCHC RBC-ENTMCNC: 23.8 PG — LOW (ref 27–34)
MCHC RBC-ENTMCNC: 28.5 GM/DL — LOW (ref 32–36)
MCV RBC AUTO: 83.6 FL — SIGNIFICANT CHANGE UP (ref 80–100)
NRBC # BLD: 0 /100 WBCS — SIGNIFICANT CHANGE UP (ref 0–0)
PLATELET # BLD AUTO: 127 K/UL — LOW (ref 150–400)
POTASSIUM SERPL-MCNC: 3.9 MMOL/L — SIGNIFICANT CHANGE UP (ref 3.5–5.3)
POTASSIUM SERPL-SCNC: 3.9 MMOL/L — SIGNIFICANT CHANGE UP (ref 3.5–5.3)
RBC # BLD: 2.81 M/UL — LOW (ref 3.8–5.2)
RBC # FLD: 15.8 % — HIGH (ref 10.3–14.5)
RH IG SCN BLD-IMP: POSITIVE — SIGNIFICANT CHANGE UP
SODIUM SERPL-SCNC: 140 MMOL/L — SIGNIFICANT CHANGE UP (ref 135–145)
WBC # BLD: 6.8 K/UL — SIGNIFICANT CHANGE UP (ref 3.8–10.5)
WBC # FLD AUTO: 6.8 K/UL — SIGNIFICANT CHANGE UP (ref 3.8–10.5)

## 2020-08-29 PROCEDURE — 99233 SBSQ HOSP IP/OBS HIGH 50: CPT

## 2020-08-29 RX ADMIN — APIXABAN 5 MILLIGRAM(S): 2.5 TABLET, FILM COATED ORAL at 17:38

## 2020-08-29 RX ADMIN — Medication 4 UNIT(S): at 19:33

## 2020-08-29 RX ADMIN — INSULIN GLARGINE 16 UNIT(S): 100 INJECTION, SOLUTION SUBCUTANEOUS at 23:02

## 2020-08-29 RX ADMIN — APIXABAN 5 MILLIGRAM(S): 2.5 TABLET, FILM COATED ORAL at 06:30

## 2020-08-29 RX ADMIN — Medication 3 MILLILITER(S): at 11:30

## 2020-08-29 RX ADMIN — Medication 4 UNIT(S): at 10:03

## 2020-08-29 RX ADMIN — MUPIROCIN 1 APPLICATION(S): 20 OINTMENT TOPICAL at 09:11

## 2020-08-29 RX ADMIN — Medication 325 MILLIGRAM(S): at 06:26

## 2020-08-29 RX ADMIN — PANTOPRAZOLE SODIUM 40 MILLIGRAM(S): 20 TABLET, DELAYED RELEASE ORAL at 06:26

## 2020-08-29 RX ADMIN — Medication 100 MILLIGRAM(S): at 13:13

## 2020-08-29 RX ADMIN — Medication 3 MILLILITER(S): at 17:37

## 2020-08-29 RX ADMIN — Medication 3 MILLILITER(S): at 23:04

## 2020-08-29 RX ADMIN — Medication 5 MILLILITER(S): at 18:27

## 2020-08-29 RX ADMIN — OXYCODONE HYDROCHLORIDE 5 MILLIGRAM(S): 5 TABLET ORAL at 02:40

## 2020-08-29 RX ADMIN — Medication 4 UNIT(S): at 14:30

## 2020-08-29 RX ADMIN — Medication 1 TABLET(S): at 11:28

## 2020-08-29 RX ADMIN — TIOTROPIUM BROMIDE 1 CAPSULE(S): 18 CAPSULE ORAL; RESPIRATORY (INHALATION) at 11:29

## 2020-08-29 RX ADMIN — Medication 500 MILLIGRAM(S): at 11:29

## 2020-08-29 RX ADMIN — Medication 400 MILLIGRAM(S): at 11:28

## 2020-08-29 RX ADMIN — Medication 180 MILLIGRAM(S): at 06:26

## 2020-08-29 RX ADMIN — BUDESONIDE AND FORMOTEROL FUMARATE DIHYDRATE 2 PUFF(S): 160; 4.5 AEROSOL RESPIRATORY (INHALATION) at 17:35

## 2020-08-29 RX ADMIN — BUDESONIDE AND FORMOTEROL FUMARATE DIHYDRATE 2 PUFF(S): 160; 4.5 AEROSOL RESPIRATORY (INHALATION) at 06:30

## 2020-08-29 RX ADMIN — ATORVASTATIN CALCIUM 80 MILLIGRAM(S): 80 TABLET, FILM COATED ORAL at 21:37

## 2020-08-29 RX ADMIN — Medication 100 MILLIGRAM(S): at 06:25

## 2020-08-29 RX ADMIN — LACTULOSE 15 GRAM(S): 10 SOLUTION ORAL at 06:30

## 2020-08-29 RX ADMIN — MONTELUKAST 10 MILLIGRAM(S): 4 TABLET, CHEWABLE ORAL at 11:29

## 2020-08-29 RX ADMIN — Medication 5 MILLILITER(S): at 06:31

## 2020-08-29 RX ADMIN — SENNA PLUS 2 TABLET(S): 8.6 TABLET ORAL at 21:37

## 2020-08-29 RX ADMIN — BUMETANIDE 2 MILLIGRAM(S): 0.25 INJECTION INTRAMUSCULAR; INTRAVENOUS at 06:26

## 2020-08-29 RX ADMIN — Medication 325 MILLIGRAM(S): at 17:38

## 2020-08-29 RX ADMIN — POLYETHYLENE GLYCOL 3350 17 GRAM(S): 17 POWDER, FOR SOLUTION ORAL at 11:26

## 2020-08-29 RX ADMIN — CHLORHEXIDINE GLUCONATE 1 APPLICATION(S): 213 SOLUTION TOPICAL at 11:30

## 2020-08-29 RX ADMIN — Medication 81 MILLIGRAM(S): at 11:29

## 2020-08-29 RX ADMIN — Medication 2000 UNIT(S): at 11:30

## 2020-08-29 RX ADMIN — BUMETANIDE 2 MILLIGRAM(S): 0.25 INJECTION INTRAMUSCULAR; INTRAVENOUS at 17:36

## 2020-08-29 RX ADMIN — Medication 3 MILLILITER(S): at 06:30

## 2020-08-29 NOTE — PROGRESS NOTE ADULT - ATTENDING COMMENTS
Plan discussed on 8/28 w pt's PMD Dr Nadia Mathew.    Pt w personality disorder, has been abusive to physicians and nursing staff during hospitalization. Repeatedly refuses to discuss discharge plan.

## 2020-08-29 NOTE — PROGRESS NOTE ADULT - SUBJECTIVE AND OBJECTIVE BOX
Patient is a 60y old  Female who presents with a chief complaint of 60F p/w generalized weakness and difficulty walking (29 Aug 2020 07:48)    HPI: Pt in bed, c/o dyspnea.     Vital Signs Last 24 Hrs  T(C): 36.6 (28 Aug 2020 23:24), Max: 36.6 (28 Aug 2020 21:37)  T(F): 97.8 (28 Aug 2020 23:24), Max: 97.8 (28 Aug 2020 21:37)  HR: 85 (29 Aug 2020 08:55) (80 - 95)  BP: 133/79 (29 Aug 2020 06:14) (133/79 - 137/75)  BP(mean): --  RR: 20 (28 Aug 2020 23:24) (20 - 20)  SpO2: 99% (29 Aug 2020 08:55) (95% - 100%)                          6.7    6.80  )-----------( 127      ( 29 Aug 2020 09:11 )             23.5     08-29    140  |  92<L>  |  24<H>  ----------------------------<  145<H>  3.9   |  40<H>  |  0.98    Ca    9.8      29 Aug 2020 09:11  Mg     2.1     08-28    MEDICATIONS  (STANDING):  acetaminophen  IVPB .. 1000 milliGRAM(s) IV Intermittent once  albuterol/ipratropium for Nebulization 3 milliLiter(s) Nebulizer every 6 hours  apixaban 5 milliGRAM(s) Oral every 12 hours  ascorbic acid 500 milliGRAM(s) Oral daily  aspirin enteric coated 81 milliGRAM(s) Oral daily  atorvastatin 80 milliGRAM(s) Oral at bedtime  azithromycin   Tablet 250 milliGRAM(s) Oral <User Schedule>  Biotene Dry Mouth Oral Rinse 5 milliLiter(s) Swish and Spit two times a day  bisacodyl Suppository 10 milliGRAM(s) Rectal daily  budesonide 160 MICROgram(s)/formoterol 4.5 MICROgram(s) Inhaler 2 Puff(s) Inhalation two times a day  buMETAnide Injectable 2 milliGRAM(s) IV Push two times a day  ceFAZolin   IVPB      ceFAZolin   IVPB 1000 milliGRAM(s) IV Intermittent every 8 hours  chlorhexidine 2% Cloths 1 Application(s) Topical daily  cholecalciferol 2000 Unit(s) Oral daily  dextrose 5%. 1000 milliLiter(s) (50 mL/Hr) IV Continuous <Continuous>  dextrose 50% Injectable 25 Gram(s) IV Push once  diltiazem    milliGRAM(s) Oral daily  ferrous    sulfate 325 milliGRAM(s) Oral two times a day  insulin glargine Injectable (LANTUS) 16 Unit(s) SubCutaneous at bedtime  insulin lispro (HumaLOG) corrective regimen sliding scale   SubCutaneous three times a day before meals  insulin lispro (HumaLOG) corrective regimen sliding scale   SubCutaneous at bedtime  insulin lispro Injectable (HumaLOG) 4 Unit(s) SubCutaneous three times a day with meals  lactulose Syrup 15 Gram(s) Oral two times a day  montelukast 10 milliGRAM(s) Oral daily  multivitamin 1 Tablet(s) Oral daily  mupirocin 2% Ointment 1 Application(s) Topical <User Schedule>  pantoprazole    Tablet 40 milliGRAM(s) Oral before breakfast  polyethylene glycol 3350 17 Gram(s) Oral daily  senna 2 Tablet(s) Oral at bedtime  theophylline ER (24 Hour) 400 milliGRAM(s) Oral daily  tiotropium 18 MICROgram(s) Capsule 1 Capsule(s) Inhalation daily

## 2020-08-29 NOTE — PROGRESS NOTE ADULT - SUBJECTIVE AND OBJECTIVE BOX
CC: no new complaints    TELEMETRY:     PHYSICAL EXAM:    T(C): 36.6 (08-28-20 @ 23:24), Max: 36.6 (08-28-20 @ 08:44)  HR: 88 (08-29-20 @ 06:22) (80 - 95)  BP: 133/79 (08-29-20 @ 06:14) (131/67 - 137/75)  RR: 20 (08-28-20 @ 23:24) (20 - 24)  SpO2: 100% (08-29-20 @ 06:22) (95% - 100%)  Wt(kg): --  I&O's Summary    28 Aug 2020 07:01  -  29 Aug 2020 07:00  --------------------------------------------------------  IN: 240 mL / OUT: 2000 mL / NET: -1760 mL        Appearance: Normal	  Cardiovascular: Normal S1 S2,RRR, No JVD, No murmurs  Respiratory: scatt rhonchi  Gastrointestinal:  Soft, Non-tender, + BS	  Extremities: + edema  Vascular: Peripheral pulses palpable 2+ bilaterally     LABS:	 	                          7.0    7.37  )-----------( 133      ( 28 Aug 2020 07:20 )             24.7     08-28    140  |  92<L>  |  27<H>  ----------------------------<  125<H>  4.3   |  40<H>  |  1.06    Ca    10.0      28 Aug 2020 07:20  Mg     2.1     08-28            CARDIAC MARKERS:        MEDICATIONS  (STANDING):  acetaminophen  IVPB .. 1000 milliGRAM(s) IV Intermittent once  albuterol/ipratropium for Nebulization 3 milliLiter(s) Nebulizer every 6 hours  apixaban 5 milliGRAM(s) Oral every 12 hours  ascorbic acid 500 milliGRAM(s) Oral daily  aspirin enteric coated 81 milliGRAM(s) Oral daily  atorvastatin 80 milliGRAM(s) Oral at bedtime  azithromycin   Tablet 250 milliGRAM(s) Oral <User Schedule>  Biotene Dry Mouth Oral Rinse 5 milliLiter(s) Swish and Spit two times a day  bisacodyl Suppository 10 milliGRAM(s) Rectal daily  budesonide 160 MICROgram(s)/formoterol 4.5 MICROgram(s) Inhaler 2 Puff(s) Inhalation two times a day  buMETAnide Injectable 2 milliGRAM(s) IV Push two times a day  ceFAZolin   IVPB      ceFAZolin   IVPB 1000 milliGRAM(s) IV Intermittent every 8 hours  chlorhexidine 2% Cloths 1 Application(s) Topical daily  cholecalciferol 2000 Unit(s) Oral daily  dextrose 5%. 1000 milliLiter(s) (50 mL/Hr) IV Continuous <Continuous>  dextrose 50% Injectable 25 Gram(s) IV Push once  diltiazem    milliGRAM(s) Oral daily  ferrous    sulfate 325 milliGRAM(s) Oral two times a day  insulin glargine Injectable (LANTUS) 16 Unit(s) SubCutaneous at bedtime  insulin lispro (HumaLOG) corrective regimen sliding scale   SubCutaneous three times a day before meals  insulin lispro (HumaLOG) corrective regimen sliding scale   SubCutaneous at bedtime  insulin lispro Injectable (HumaLOG) 4 Unit(s) SubCutaneous three times a day with meals  lactulose Syrup 15 Gram(s) Oral two times a day  montelukast 10 milliGRAM(s) Oral daily  multivitamin 1 Tablet(s) Oral daily  mupirocin 2% Ointment 1 Application(s) Topical <User Schedule>  pantoprazole    Tablet 40 milliGRAM(s) Oral before breakfast  polyethylene glycol 3350 17 Gram(s) Oral daily  senna 2 Tablet(s) Oral at bedtime  theophylline ER (24 Hour) 400 milliGRAM(s) Oral daily  tiotropium 18 MICROgram(s) Capsule 1 Capsule(s) Inhalation daily

## 2020-08-29 NOTE — PROGRESS NOTE ADULT - ASSESSMENT
60F w COPD on 3LNC (?pending transplant list at [facility]), former smoker, CAD s/p stent (2016), afib on eliquis, uncontrolled DM2, raynaud phenomenon and recent hospitalization at [facility] for altered mental status and AURORA aw COPD exacerbation, LE swelling, weakness.

## 2020-08-29 NOTE — PROGRESS NOTE ADULT - PSYCHIATRIC COMMENTS
Belligerent
Belligerent, combative
Belligerent
Belligerent, rude, refusing to engage in conversation.

## 2020-08-29 NOTE — PROGRESS NOTE ADULT - PROBLEM SELECTOR PLAN 6
Hb slowly drifting down since admission - now 6.7. Anemia likely exacerbated by prolonged hospitalization and frequent blood draws. Low serum iron. Pt now agrees to a transfusion- ordered.

## 2020-08-29 NOTE — PROVIDER CONTACT NOTE (CRITICAL VALUE NOTIFICATION) - BACKGROUND
pt. admitted with respiratory distress and weakness
adm with resp distress  has been trending low h&h but refusing blood transfusion
weakness
weakness

## 2020-08-29 NOTE — PROGRESS NOTE ADULT - ASSESSMENT
EVAN 1/7/19: no ALINA thrombus, unable to assess LV sys fx  echo 12/7/18: EF 71%, nl LV sys fx, mild diastolic dysfx     a/p    60 year old female with hx of D CHF, HTN, CAD s/p PCI, Afib/ aflutter, s/p Aflutter Ablation 12/2019, COPD, DM2, Anxiety, , raynaud phenomenon presenting with weakness, SOB , hyperkalemia.      1. Dyspnea, Resp failure  -secondary to chronic lung disease, COPD, volume overload  -pulm f/u   -covid 19 negative  -no acs , cv stable   -ecg with known RBBB, new twi noted, hyperkalemia also noted on admission  -echo with normal LVEF, mild diastolic dysfunction   -s/p PO Prednisone  -on Duoneb, Symbicort, Spiriva and Theophylline.    2. CAD s/p PCI   -stable, no cp  -c/w ASA, statin  -echo noted above     3. Afib/aflutter s/p  Aflutter Ablation 12/2019  -in NSR, cw cardizem  mg daily  -CHADsVac=2, c/w eliquis     4. Acute on Chronic Diastolic CHF   -dyspnea mostly secondary to copd  -intermittently on bipap   -echo with normal lVEF   -cont IV bumex  -creat stable, LE edema improved,   -closely monitor bicarb, creat     dvt ppx

## 2020-08-29 NOTE — PROGRESS NOTE ADULT - PROBLEM SELECTOR PLAN 2
Bumex increased on 8/28. Monitor. Discussed w pulmonary Dr Castro- if bicarb increases, obtain ABG to check ph.

## 2020-08-29 NOTE — PROVIDER CONTACT NOTE (CRITICAL VALUE NOTIFICATION) - ASSESSMENT
no s/s of active bleeding, vss, patient resting comfortably in bed
no s/s active bleeding  vss
pt on continuous bipap
VSS
asymptomatic. No s/s of bleeding

## 2020-08-29 NOTE — CHART NOTE - NSCHARTNOTEFT_GEN_A_CORE
Called by RN this am for critical result Hgb of 6.7. Pt seen at bedside with hospitalist, Dr. Claire. Pt denies any signs of bleeding, chest pain, or worsening sob. VSS. Pt was given explanation on the importance of receiving blood. Pt verbalized understanding and signed consent. Pt to recived 1 unit prbc split as pt with chf and volume overload in b/l LE. Cbc to follow up in am.   Gina NP   [id number]

## 2020-08-30 LAB
ANION GAP SERPL CALC-SCNC: 11 MMOL/L — SIGNIFICANT CHANGE UP (ref 5–17)
BUN SERPL-MCNC: 22 MG/DL — SIGNIFICANT CHANGE UP (ref 7–23)
CALCIUM SERPL-MCNC: 10 MG/DL — SIGNIFICANT CHANGE UP (ref 8.4–10.5)
CHLORIDE SERPL-SCNC: 92 MMOL/L — LOW (ref 96–108)
CO2 SERPL-SCNC: 36 MMOL/L — HIGH (ref 22–31)
CREAT SERPL-MCNC: 0.93 MG/DL — SIGNIFICANT CHANGE UP (ref 0.5–1.3)
GLUCOSE BLDC GLUCOMTR-MCNC: 102 MG/DL — HIGH (ref 70–99)
GLUCOSE BLDC GLUCOMTR-MCNC: 109 MG/DL — HIGH (ref 70–99)
GLUCOSE BLDC GLUCOMTR-MCNC: 116 MG/DL — HIGH (ref 70–99)
GLUCOSE BLDC GLUCOMTR-MCNC: 117 MG/DL — HIGH (ref 70–99)
GLUCOSE SERPL-MCNC: 86 MG/DL — SIGNIFICANT CHANGE UP (ref 70–99)
HCT VFR BLD CALC: 28.6 % — LOW (ref 34.5–45)
HGB BLD-MCNC: 8.4 G/DL — LOW (ref 11.5–15.5)
MCHC RBC-ENTMCNC: 24.7 PG — LOW (ref 27–34)
MCHC RBC-ENTMCNC: 29.4 GM/DL — LOW (ref 32–36)
MCV RBC AUTO: 84.1 FL — SIGNIFICANT CHANGE UP (ref 80–100)
NRBC # BLD: 0 /100 WBCS — SIGNIFICANT CHANGE UP (ref 0–0)
PLATELET # BLD AUTO: 157 K/UL — SIGNIFICANT CHANGE UP (ref 150–400)
POTASSIUM SERPL-MCNC: 3.8 MMOL/L — SIGNIFICANT CHANGE UP (ref 3.5–5.3)
POTASSIUM SERPL-SCNC: 3.8 MMOL/L — SIGNIFICANT CHANGE UP (ref 3.5–5.3)
RBC # BLD: 3.4 M/UL — LOW (ref 3.8–5.2)
RBC # FLD: 16.1 % — HIGH (ref 10.3–14.5)
SODIUM SERPL-SCNC: 139 MMOL/L — SIGNIFICANT CHANGE UP (ref 135–145)
WBC # BLD: 7.69 K/UL — SIGNIFICANT CHANGE UP (ref 3.8–10.5)
WBC # FLD AUTO: 7.69 K/UL — SIGNIFICANT CHANGE UP (ref 3.8–10.5)

## 2020-08-30 PROCEDURE — 99233 SBSQ HOSP IP/OBS HIGH 50: CPT

## 2020-08-30 RX ADMIN — PANTOPRAZOLE SODIUM 40 MILLIGRAM(S): 20 TABLET, DELAYED RELEASE ORAL at 06:05

## 2020-08-30 RX ADMIN — BUDESONIDE AND FORMOTEROL FUMARATE DIHYDRATE 2 PUFF(S): 160; 4.5 AEROSOL RESPIRATORY (INHALATION) at 18:02

## 2020-08-30 RX ADMIN — OXYCODONE HYDROCHLORIDE 5 MILLIGRAM(S): 5 TABLET ORAL at 13:26

## 2020-08-30 RX ADMIN — Medication 4 UNIT(S): at 18:03

## 2020-08-30 RX ADMIN — Medication 325 MILLIGRAM(S): at 05:50

## 2020-08-30 RX ADMIN — ATORVASTATIN CALCIUM 80 MILLIGRAM(S): 80 TABLET, FILM COATED ORAL at 22:01

## 2020-08-30 RX ADMIN — INSULIN GLARGINE 16 UNIT(S): 100 INJECTION, SOLUTION SUBCUTANEOUS at 22:03

## 2020-08-30 RX ADMIN — Medication 2000 UNIT(S): at 13:15

## 2020-08-30 RX ADMIN — BUMETANIDE 2 MILLIGRAM(S): 0.25 INJECTION INTRAMUSCULAR; INTRAVENOUS at 18:02

## 2020-08-30 RX ADMIN — Medication 180 MILLIGRAM(S): at 05:50

## 2020-08-30 RX ADMIN — OXYCODONE HYDROCHLORIDE 5 MILLIGRAM(S): 5 TABLET ORAL at 06:08

## 2020-08-30 RX ADMIN — POLYETHYLENE GLYCOL 3350 17 GRAM(S): 17 POWDER, FOR SOLUTION ORAL at 13:16

## 2020-08-30 RX ADMIN — Medication 4 UNIT(S): at 09:19

## 2020-08-30 RX ADMIN — OXYCODONE HYDROCHLORIDE 5 MILLIGRAM(S): 5 TABLET ORAL at 22:18

## 2020-08-30 RX ADMIN — OXYCODONE HYDROCHLORIDE 5 MILLIGRAM(S): 5 TABLET ORAL at 23:18

## 2020-08-30 RX ADMIN — Medication 3 MILLILITER(S): at 18:02

## 2020-08-30 RX ADMIN — Medication 81 MILLIGRAM(S): at 13:16

## 2020-08-30 RX ADMIN — CHLORHEXIDINE GLUCONATE 1 APPLICATION(S): 213 SOLUTION TOPICAL at 12:13

## 2020-08-30 RX ADMIN — APIXABAN 5 MILLIGRAM(S): 2.5 TABLET, FILM COATED ORAL at 18:02

## 2020-08-30 RX ADMIN — APIXABAN 5 MILLIGRAM(S): 2.5 TABLET, FILM COATED ORAL at 05:52

## 2020-08-30 RX ADMIN — Medication 100 MILLIGRAM(S): at 00:50

## 2020-08-30 RX ADMIN — OXYCODONE HYDROCHLORIDE 5 MILLIGRAM(S): 5 TABLET ORAL at 01:50

## 2020-08-30 RX ADMIN — OXYCODONE HYDROCHLORIDE 5 MILLIGRAM(S): 5 TABLET ORAL at 00:50

## 2020-08-30 RX ADMIN — OXYCODONE HYDROCHLORIDE 5 MILLIGRAM(S): 5 TABLET ORAL at 13:56

## 2020-08-30 RX ADMIN — Medication 3 MILLILITER(S): at 13:18

## 2020-08-30 RX ADMIN — Medication 400 MILLIGRAM(S): at 13:28

## 2020-08-30 RX ADMIN — Medication 1 TABLET(S): at 13:15

## 2020-08-30 RX ADMIN — Medication 100 MILLIGRAM(S): at 08:16

## 2020-08-30 RX ADMIN — OXYCODONE HYDROCHLORIDE 5 MILLIGRAM(S): 5 TABLET ORAL at 07:03

## 2020-08-30 RX ADMIN — TIOTROPIUM BROMIDE 1 CAPSULE(S): 18 CAPSULE ORAL; RESPIRATORY (INHALATION) at 13:17

## 2020-08-30 RX ADMIN — Medication 4 UNIT(S): at 14:49

## 2020-08-30 RX ADMIN — Medication 325 MILLIGRAM(S): at 18:02

## 2020-08-30 RX ADMIN — SENNA PLUS 2 TABLET(S): 8.6 TABLET ORAL at 22:01

## 2020-08-30 RX ADMIN — Medication 500 MILLIGRAM(S): at 13:16

## 2020-08-30 RX ADMIN — Medication 100 MILLIGRAM(S): at 16:43

## 2020-08-30 RX ADMIN — MUPIROCIN 1 APPLICATION(S): 20 OINTMENT TOPICAL at 12:07

## 2020-08-30 RX ADMIN — Medication 0: at 22:03

## 2020-08-30 RX ADMIN — MONTELUKAST 10 MILLIGRAM(S): 4 TABLET, CHEWABLE ORAL at 13:15

## 2020-08-30 RX ADMIN — BUMETANIDE 2 MILLIGRAM(S): 0.25 INJECTION INTRAMUSCULAR; INTRAVENOUS at 05:51

## 2020-08-30 RX ADMIN — Medication 100 MILLIGRAM(S): at 22:02

## 2020-08-30 RX ADMIN — LACTULOSE 15 GRAM(S): 10 SOLUTION ORAL at 18:00

## 2020-08-30 NOTE — PROGRESS NOTE ADULT - PROBLEM SELECTOR PLAN 5
w erythema. Started on IV Ancef on 8/28 per ID for possible cellulitis. Monitor.    Pt feels b/l foot swelling is getting worse- wants podiatry to come back- called by nurse.

## 2020-08-30 NOTE — PROGRESS NOTE ADULT - ASSESSMENT
60F w COPD on 3LNC (?pending transplant list at [facility]), former smoker, CAD s/p stent (2016), afib on eliquis, uncontrolled DM2, raynaud phenomenon and recent hospitalization at [facility] for altered mental status and AURORA aw COPD exacerbation, LE swelling, weakness.     Prolonged hospitalization.

## 2020-08-30 NOTE — PROGRESS NOTE ADULT - SUBJECTIVE AND OBJECTIVE BOX
CC: no new complaints    TELEMETRY:     PHYSICAL EXAM:    T(C): 36.8 (08-30-20 @ 16:09), Max: 36.9 (08-29-20 @ 20:15)  HR: 89 (08-30-20 @ 16:09) (70 - 95)  BP: 137/70 (08-30-20 @ 16:09) (123/65 - 149/80)  RR: 18 (08-30-20 @ 16:09) (18 - 20)  SpO2: 93% (08-30-20 @ 16:09) (93% - 100%)  Wt(kg): --  I&O's Summary    29 Aug 2020 07:01  -  30 Aug 2020 07:00  --------------------------------------------------------  IN: 955 mL / OUT: 1400 mL / NET: -445 mL    30 Aug 2020 07:01  -  30 Aug 2020 17:10  --------------------------------------------------------  IN: 0 mL / OUT: 600 mL / NET: -600 mL        Appearance: Normal	  Cardiovascular: Normal S1 S2,RRR, No JVD, No murmurs  Respiratory: Lungs clear to auscultation	  Gastrointestinal:  Soft, Non-tender, + BS	  Extremities: Normal range of motion, No clubbing, cyanosis or edema  Vascular: Peripheral pulses palpable 2+ bilaterally     LABS:	 	                          8.4    7.69  )-----------( 157      ( 30 Aug 2020 10:04 )             28.6     08-30    139  |  92<L>  |  22  ----------------------------<  86  3.8   |  36<H>  |  0.93    Ca    10.0      30 Aug 2020 10:04            CARDIAC MARKERS:      MEDICATIONS  (STANDING):  acetaminophen  IVPB .. 1000 milliGRAM(s) IV Intermittent once  albuterol/ipratropium for Nebulization 3 milliLiter(s) Nebulizer every 6 hours  apixaban 5 milliGRAM(s) Oral every 12 hours  ascorbic acid 500 milliGRAM(s) Oral daily  aspirin enteric coated 81 milliGRAM(s) Oral daily  atorvastatin 80 milliGRAM(s) Oral at bedtime  azithromycin   Tablet 250 milliGRAM(s) Oral <User Schedule>  Biotene Dry Mouth Oral Rinse 5 milliLiter(s) Swish and Spit two times a day  bisacodyl Suppository 10 milliGRAM(s) Rectal daily  budesonide 160 MICROgram(s)/formoterol 4.5 MICROgram(s) Inhaler 2 Puff(s) Inhalation two times a day  buMETAnide Injectable 2 milliGRAM(s) IV Push two times a day  ceFAZolin   IVPB      ceFAZolin   IVPB 1000 milliGRAM(s) IV Intermittent every 8 hours  chlorhexidine 2% Cloths 1 Application(s) Topical daily  cholecalciferol 2000 Unit(s) Oral daily  dextrose 5%. 1000 milliLiter(s) (50 mL/Hr) IV Continuous <Continuous>  dextrose 50% Injectable 25 Gram(s) IV Push once  diltiazem    milliGRAM(s) Oral daily  ferrous    sulfate 325 milliGRAM(s) Oral two times a day  insulin glargine Injectable (LANTUS) 16 Unit(s) SubCutaneous at bedtime  insulin lispro (HumaLOG) corrective regimen sliding scale   SubCutaneous three times a day before meals  insulin lispro (HumaLOG) corrective regimen sliding scale   SubCutaneous at bedtime  insulin lispro Injectable (HumaLOG) 4 Unit(s) SubCutaneous three times a day with meals  lactulose Syrup 15 Gram(s) Oral two times a day  montelukast 10 milliGRAM(s) Oral daily  multivitamin 1 Tablet(s) Oral daily  mupirocin 2% Ointment 1 Application(s) Topical <User Schedule>  pantoprazole    Tablet 40 milliGRAM(s) Oral before breakfast  polyethylene glycol 3350 17 Gram(s) Oral daily  senna 2 Tablet(s) Oral at bedtime  theophylline ER (24 Hour) 400 milliGRAM(s) Oral daily  tiotropium 18 MICROgram(s) Capsule 1 Capsule(s) Inhalation daily

## 2020-08-30 NOTE — PROGRESS NOTE ADULT - EXTREMITIES COMMENTS
2+ LE edema, b/l foot swelling
2+ LE edema
LE edema
LE edema improving
2+ LE edema
2+ LE edema
LE edema improving
2+ pitting edema
LE ACE wrap bandages in place
LE edema. L foot erythema

## 2020-08-30 NOTE — PROGRESS NOTE ADULT - MUSCULOSKELETAL
No joint pain, swelling or deformity; no limitation of movement

## 2020-08-30 NOTE — PROGRESS NOTE ADULT - CONSTITUTIONAL COMMENTS
Pt seen w NP, nurse and nurse manager. Continued to be abusive towards staff and keeps attempting to deflect conversation.
Awake, belligerent, refusing to engage in conversation
Awake, belligerent, yelling at staff
Awake, sitting up in bed.
Belligerent
In bed, appears comfortable. Belligerent.
In bed, on Bipap
Awake, belligerent
Awake, sitting up in chair

## 2020-08-30 NOTE — PROGRESS NOTE ADULT - CONSTITUTIONAL
detailed exam
Well-developed, well nourished
detailed exam

## 2020-08-30 NOTE — PROGRESS NOTE ADULT - CARDIOVASCULAR
Regular rate & rhythm, normal S1, S2; no murmurs, gallops or rubs; no S3, S4

## 2020-08-30 NOTE — PROGRESS NOTE ADULT - NS NEC GEN PE MLT EXAM PC
No bruits; no thyromegaly or nodules

## 2020-08-30 NOTE — PROGRESS NOTE ADULT - NEUROLOGICAL
Alert & oriented; no sensory, motor or coordination deficits, normal reflexes

## 2020-08-30 NOTE — PROGRESS NOTE ADULT - EYES
EOMI; PERRL; no drainage or redness

## 2020-08-30 NOTE — PROGRESS NOTE ADULT - PROBLEM SELECTOR PLAN 3
A1C 7.5. Lantus and premeal insulin- episode of hypoglycemia on 8/27, dose decreased. Now controlled.

## 2020-08-30 NOTE — PROGRESS NOTE ADULT - PSYCHIATRIC
detailed exam
Affect and characteristics of appearance, verbalizations, behaviors are appropriate
detailed exam
Affect and characteristics of appearance, verbalizations, behaviors are appropriate
detailed exam
Affect and characteristics of appearance, verbalizations, behaviors are appropriate
detailed exam
detailed exam
Affect and characteristics of appearance, verbalizations, behaviors are appropriate

## 2020-08-30 NOTE — PROGRESS NOTE ADULT - PROBLEM SELECTOR PLAN 6
Hb slowly drifting down since admission, likely exacerbated by prolonged hospitalization and frequent blood draws. Low serum iron. Transfused 8/29 w improvement.

## 2020-08-30 NOTE — PROGRESS NOTE ADULT - GASTROINTESTINAL
detailed exam
Soft, non-tender, no hepatosplenomegaly, normal bowel sounds

## 2020-08-30 NOTE — PROGRESS NOTE ADULT - RESPIRATORY COMMENTS
Decreased breath sounds
Decreased breath sounds at bases
Decreased breath sounds
Decreased breath sounds in bases
Decreased breath sounds
Decreased breath sounds

## 2020-08-30 NOTE — PROGRESS NOTE ADULT - BACK
No deformity or limitation of movement
not examined
No deformity or limitation of movement
detailed exam
not examined
No deformity or limitation of movement

## 2020-08-31 LAB
ALBUMIN SERPL ELPH-MCNC: 3.6 G/DL — SIGNIFICANT CHANGE UP (ref 3.3–5)
ALP SERPL-CCNC: 65 U/L — SIGNIFICANT CHANGE UP (ref 40–120)
ALT FLD-CCNC: 13 U/L — SIGNIFICANT CHANGE UP (ref 10–45)
ANION GAP SERPL CALC-SCNC: 6 MMOL/L — SIGNIFICANT CHANGE UP (ref 5–17)
AST SERPL-CCNC: 26 U/L — SIGNIFICANT CHANGE UP (ref 10–40)
BASOPHILS # BLD AUTO: 0.03 K/UL — SIGNIFICANT CHANGE UP (ref 0–0.2)
BASOPHILS NFR BLD AUTO: 0.4 % — SIGNIFICANT CHANGE UP (ref 0–2)
BILIRUB SERPL-MCNC: 0.2 MG/DL — SIGNIFICANT CHANGE UP (ref 0.2–1.2)
BUN SERPL-MCNC: 21 MG/DL — SIGNIFICANT CHANGE UP (ref 7–23)
CALCIUM SERPL-MCNC: 10.3 MG/DL — SIGNIFICANT CHANGE UP (ref 8.4–10.5)
CHLORIDE SERPL-SCNC: 92 MMOL/L — LOW (ref 96–108)
CO2 SERPL-SCNC: 40 MMOL/L — HIGH (ref 22–31)
CREAT SERPL-MCNC: 1.01 MG/DL — SIGNIFICANT CHANGE UP (ref 0.5–1.3)
EOSINOPHIL # BLD AUTO: 0.1 K/UL — SIGNIFICANT CHANGE UP (ref 0–0.5)
EOSINOPHIL NFR BLD AUTO: 1.2 % — SIGNIFICANT CHANGE UP (ref 0–6)
GLUCOSE BLDC GLUCOMTR-MCNC: 105 MG/DL — HIGH (ref 70–99)
GLUCOSE BLDC GLUCOMTR-MCNC: 143 MG/DL — HIGH (ref 70–99)
GLUCOSE BLDC GLUCOMTR-MCNC: 168 MG/DL — HIGH (ref 70–99)
GLUCOSE BLDC GLUCOMTR-MCNC: 176 MG/DL — HIGH (ref 70–99)
GLUCOSE SERPL-MCNC: 80 MG/DL — SIGNIFICANT CHANGE UP (ref 70–99)
HCT VFR BLD CALC: 29.2 % — LOW (ref 34.5–45)
HGB BLD-MCNC: 8.2 G/DL — LOW (ref 11.5–15.5)
IMM GRANULOCYTES NFR BLD AUTO: 0.6 % — SIGNIFICANT CHANGE UP (ref 0–1.5)
LYMPHOCYTES # BLD AUTO: 1.45 K/UL — SIGNIFICANT CHANGE UP (ref 1–3.3)
LYMPHOCYTES # BLD AUTO: 17.5 % — SIGNIFICANT CHANGE UP (ref 13–44)
MCHC RBC-ENTMCNC: 24 PG — LOW (ref 27–34)
MCHC RBC-ENTMCNC: 28.1 GM/DL — LOW (ref 32–36)
MCV RBC AUTO: 85.6 FL — SIGNIFICANT CHANGE UP (ref 80–100)
METHYLMALONATE SERPL-SCNC: 422 NMOL/L — HIGH (ref 0–378)
MONOCYTES # BLD AUTO: 0.8 K/UL — SIGNIFICANT CHANGE UP (ref 0–0.9)
MONOCYTES NFR BLD AUTO: 9.7 % — SIGNIFICANT CHANGE UP (ref 2–14)
NEUTROPHILS # BLD AUTO: 5.86 K/UL — SIGNIFICANT CHANGE UP (ref 1.8–7.4)
NEUTROPHILS NFR BLD AUTO: 70.6 % — SIGNIFICANT CHANGE UP (ref 43–77)
NRBC # BLD: 0 /100 WBCS — SIGNIFICANT CHANGE UP (ref 0–0)
PLATELET # BLD AUTO: 184 K/UL — SIGNIFICANT CHANGE UP (ref 150–400)
POTASSIUM SERPL-MCNC: 4.8 MMOL/L — SIGNIFICANT CHANGE UP (ref 3.5–5.3)
POTASSIUM SERPL-SCNC: 4.8 MMOL/L — SIGNIFICANT CHANGE UP (ref 3.5–5.3)
PROT SERPL-MCNC: 6.1 G/DL — SIGNIFICANT CHANGE UP (ref 6–8.3)
RBC # BLD: 3.41 M/UL — LOW (ref 3.8–5.2)
RBC # FLD: 16.9 % — HIGH (ref 10.3–14.5)
SODIUM SERPL-SCNC: 138 MMOL/L — SIGNIFICANT CHANGE UP (ref 135–145)
WBC # BLD: 8.29 K/UL — SIGNIFICANT CHANGE UP (ref 3.8–10.5)
WBC # FLD AUTO: 8.29 K/UL — SIGNIFICANT CHANGE UP (ref 3.8–10.5)

## 2020-08-31 PROCEDURE — 99233 SBSQ HOSP IP/OBS HIGH 50: CPT

## 2020-08-31 PROCEDURE — 99232 SBSQ HOSP IP/OBS MODERATE 35: CPT

## 2020-08-31 RX ADMIN — Medication 4 UNIT(S): at 09:29

## 2020-08-31 RX ADMIN — SENNA PLUS 2 TABLET(S): 8.6 TABLET ORAL at 21:11

## 2020-08-31 RX ADMIN — Medication 325 MILLIGRAM(S): at 06:51

## 2020-08-31 RX ADMIN — APIXABAN 5 MILLIGRAM(S): 2.5 TABLET, FILM COATED ORAL at 06:51

## 2020-08-31 RX ADMIN — Medication 1: at 09:29

## 2020-08-31 RX ADMIN — BUDESONIDE AND FORMOTEROL FUMARATE DIHYDRATE 2 PUFF(S): 160; 4.5 AEROSOL RESPIRATORY (INHALATION) at 19:34

## 2020-08-31 RX ADMIN — BUMETANIDE 2 MILLIGRAM(S): 0.25 INJECTION INTRAMUSCULAR; INTRAVENOUS at 07:30

## 2020-08-31 RX ADMIN — Medication 100 MILLIGRAM(S): at 21:12

## 2020-08-31 RX ADMIN — Medication 1 TABLET(S): at 13:11

## 2020-08-31 RX ADMIN — INSULIN GLARGINE 16 UNIT(S): 100 INJECTION, SOLUTION SUBCUTANEOUS at 21:43

## 2020-08-31 RX ADMIN — BUMETANIDE 2 MILLIGRAM(S): 0.25 INJECTION INTRAMUSCULAR; INTRAVENOUS at 19:33

## 2020-08-31 RX ADMIN — MUPIROCIN 1 APPLICATION(S): 20 OINTMENT TOPICAL at 14:43

## 2020-08-31 RX ADMIN — OXYCODONE HYDROCHLORIDE 5 MILLIGRAM(S): 5 TABLET ORAL at 07:57

## 2020-08-31 RX ADMIN — Medication 5 MILLILITER(S): at 07:15

## 2020-08-31 RX ADMIN — Medication 100 MILLIGRAM(S): at 13:20

## 2020-08-31 RX ADMIN — Medication 500 MILLIGRAM(S): at 13:11

## 2020-08-31 RX ADMIN — Medication 180 MILLIGRAM(S): at 06:51

## 2020-08-31 RX ADMIN — Medication 0: at 21:45

## 2020-08-31 RX ADMIN — Medication 2000 UNIT(S): at 13:11

## 2020-08-31 RX ADMIN — TIOTROPIUM BROMIDE 1 CAPSULE(S): 18 CAPSULE ORAL; RESPIRATORY (INHALATION) at 13:12

## 2020-08-31 RX ADMIN — PANTOPRAZOLE SODIUM 40 MILLIGRAM(S): 20 TABLET, DELAYED RELEASE ORAL at 06:51

## 2020-08-31 RX ADMIN — APIXABAN 5 MILLIGRAM(S): 2.5 TABLET, FILM COATED ORAL at 19:32

## 2020-08-31 RX ADMIN — Medication 5 MILLILITER(S): at 19:32

## 2020-08-31 RX ADMIN — Medication 100 MILLIGRAM(S): at 06:54

## 2020-08-31 RX ADMIN — ATORVASTATIN CALCIUM 80 MILLIGRAM(S): 80 TABLET, FILM COATED ORAL at 21:11

## 2020-08-31 RX ADMIN — OXYCODONE HYDROCHLORIDE 5 MILLIGRAM(S): 5 TABLET ORAL at 07:27

## 2020-08-31 RX ADMIN — OXYCODONE HYDROCHLORIDE 5 MILLIGRAM(S): 5 TABLET ORAL at 21:11

## 2020-08-31 RX ADMIN — MONTELUKAST 10 MILLIGRAM(S): 4 TABLET, CHEWABLE ORAL at 13:11

## 2020-08-31 RX ADMIN — Medication 4 UNIT(S): at 19:34

## 2020-08-31 RX ADMIN — Medication 400 MILLIGRAM(S): at 13:11

## 2020-08-31 RX ADMIN — Medication 3 MILLILITER(S): at 06:51

## 2020-08-31 RX ADMIN — Medication 325 MILLIGRAM(S): at 19:34

## 2020-08-31 RX ADMIN — OXYCODONE HYDROCHLORIDE 5 MILLIGRAM(S): 5 TABLET ORAL at 21:45

## 2020-08-31 RX ADMIN — Medication 81 MILLIGRAM(S): at 13:12

## 2020-08-31 RX ADMIN — Medication 3 MILLILITER(S): at 19:34

## 2020-08-31 RX ADMIN — Medication 3 MILLILITER(S): at 13:12

## 2020-08-31 RX ADMIN — POLYETHYLENE GLYCOL 3350 17 GRAM(S): 17 POWDER, FOR SOLUTION ORAL at 13:12

## 2020-08-31 RX ADMIN — AZITHROMYCIN 250 MILLIGRAM(S): 500 TABLET, FILM COATED ORAL at 19:32

## 2020-08-31 NOTE — PROGRESS NOTE ADULT - ASSESSMENT
60F w/ end stage COPD on 3LNC (pending transplant list at [facility]), former smoker, CAD s/p stent (2016), afib on eliquis, uncontrolled DM2, raynaud phenomenon and recent hospitalization at [facility] for altered mental status and AUR[facility]A presents to [facility] for evaluation of generalized weakness and difficulty walking.    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   No fever or leucocytosis   Pt with recent administration of steroids, some immunosuppression.   Possible LLE cellulitis  b/l foot ulcers.       Plan:   c/w cefazolin 1 gm iv q8h for now  can switch to po keflex 500 mg q8h when ready for discharge   day 4/7 of therapy today,  erythema more consistent with venous stasis dermatitis rather than infection at this time.   leg elevation   compression stockings or ace wrap if tolerated   wound care for b/l foot ulcers

## 2020-08-31 NOTE — PROGRESS NOTE ADULT - PROBLEM SELECTOR PLAN 5
-w erythema. Started on IV Ancef on 8/28 per ID for possible cellulitis. Monitor.  -Pt feels b/l foot swelling is getting worse, podiatry re-evaluated 8/31  -continue with daily mupirocin with adaptic and dsd to left forefoot daily, ACE compression b/l if pt tolerates

## 2020-08-31 NOTE — PROGRESS NOTE ADULT - SUBJECTIVE AND OBJECTIVE BOX
CARDIOLOGY FOLLOW UP - Dr. Brunson    CC  no acute events     PHYSICAL EXAM:  T(C): 36.6 (08-31-20 @ 09:00), Max: 36.8 (08-30-20 @ 16:09)  HR: 84 (08-31-20 @ 09:13) (81 - 94)  BP: 131/68 (08-31-20 @ 09:00) (127/63 - 137/70)  RR: 18 (08-31-20 @ 09:00) (18 - 18)  SpO2: 96% (08-31-20 @ 09:13) (93% - 100%)  Wt(kg): --  I&O's Summary    30 Aug 2020 07:01  -  31 Aug 2020 07:00  --------------------------------------------------------  IN: 0 mL / OUT: 1550 mL / NET: -1550 mL    31 Aug 2020 07:01  -  31 Aug 2020 12:22  --------------------------------------------------------  IN: 120 mL / OUT: 500 mL / NET: -380 mL        Appearance: Normal	  Cardiovascular: Normal S1 S2,RRR, No JVD, No murmurs  Respiratory: diminished   Gastrointestinal:  Soft, Non-tender, + BS	  Extremities: Normal range of motion, No clubbing, b/l ++ le edema         MEDICATIONS  (STANDING):  acetaminophen  IVPB .. 1000 milliGRAM(s) IV Intermittent once  albuterol/ipratropium for Nebulization 3 milliLiter(s) Nebulizer every 6 hours  apixaban 5 milliGRAM(s) Oral every 12 hours  ascorbic acid 500 milliGRAM(s) Oral daily  aspirin enteric coated 81 milliGRAM(s) Oral daily  atorvastatin 80 milliGRAM(s) Oral at bedtime  azithromycin   Tablet 250 milliGRAM(s) Oral <User Schedule>  Biotene Dry Mouth Oral Rinse 5 milliLiter(s) Swish and Spit two times a day  bisacodyl Suppository 10 milliGRAM(s) Rectal daily  budesonide 160 MICROgram(s)/formoterol 4.5 MICROgram(s) Inhaler 2 Puff(s) Inhalation two times a day  buMETAnide Injectable 2 milliGRAM(s) IV Push two times a day  ceFAZolin   IVPB      ceFAZolin   IVPB 1000 milliGRAM(s) IV Intermittent every 8 hours  chlorhexidine 2% Cloths 1 Application(s) Topical daily  cholecalciferol 2000 Unit(s) Oral daily  dextrose 5%. 1000 milliLiter(s) (50 mL/Hr) IV Continuous <Continuous>  dextrose 50% Injectable 25 Gram(s) IV Push once  diltiazem    milliGRAM(s) Oral daily  ferrous    sulfate 325 milliGRAM(s) Oral two times a day  insulin glargine Injectable (LANTUS) 16 Unit(s) SubCutaneous at bedtime  insulin lispro (HumaLOG) corrective regimen sliding scale   SubCutaneous three times a day before meals  insulin lispro (HumaLOG) corrective regimen sliding scale   SubCutaneous at bedtime  insulin lispro Injectable (HumaLOG) 4 Unit(s) SubCutaneous three times a day with meals  lactulose Syrup 15 Gram(s) Oral two times a day  montelukast 10 milliGRAM(s) Oral daily  multivitamin 1 Tablet(s) Oral daily  mupirocin 2% Ointment 1 Application(s) Topical <User Schedule>  pantoprazole    Tablet 40 milliGRAM(s) Oral before breakfast  polyethylene glycol 3350 17 Gram(s) Oral daily  senna 2 Tablet(s) Oral at bedtime  theophylline ER (24 Hour) 400 milliGRAM(s) Oral daily  tiotropium 18 MICROgram(s) Capsule 1 Capsule(s) Inhalation daily      TELEMETRY: 	    ECG:  	  RADIOLOGY:   DIAGNOSTIC TESTING:  [ ] Echocardiogram:  [ ]  Catheterization:  [ ] Stress Test:    OTHER: 	    LABS:	 	                                8.2    8.29  )-----------( 184      ( 31 Aug 2020 09:41 )             29.2     08-31    138  |  92<L>  |  21  ----------------------------<  80  4.8   |  40<H>  |  1.01    Ca    10.3      31 Aug 2020 09:41    TPro  6.1  /  Alb  3.6  /  TBili  0.2  /  DBili  x   /  AST  26  /  ALT  13  /  AlkPhos  65  08-31

## 2020-08-31 NOTE — PROGRESS NOTE ADULT - PROBLEM SELECTOR PLAN 1
-Dyspnea multifactorial in the setting of underlying lung disease, cardiovascular dysfunction, obesity, and deconditioning.   -Was given prolonged course of Prednisone, now tapered off. Overnight and prn Bipap. Pt comfortable off Bipap and able to speak in full sentences.   - cont azithro, duonebs, spiriva, symbicort, singulair  -Continue supplemental O2 with goal O2 sat > 88% - she does not need to be at 100%.

## 2020-08-31 NOTE — PROGRESS NOTE ADULT - ASSESSMENT
--Left dm foot bullae secondary to fluid retention    --erythema increased at this time to the left foot, erythema not extending past bulla area, possible cellulitis, adaptic touch and mupirocin applied  --consider broadening antibiotic coverage as erythema is not improved    --recommend continued daily mupirocin with adaptic and dsd left forefoot daily  --ACE compression bilat if tolerated by patient  --recommend continue podiatric footcare as outpatient with current dpm  --will monitor during this admission

## 2020-08-31 NOTE — PROGRESS NOTE ADULT - PROBLEM SELECTOR PLAN 2
-Bumex increased on 8/28. Monitor  -net negative 380ml overnight   -Cr stable but CO2 uptrending, will check ABG

## 2020-08-31 NOTE — PROGRESS NOTE ADULT - SUBJECTIVE AND OBJECTIVE BOX
Patient is a 60y old  Female who presents with a chief complaint of 60F p/w generalized weakness and difficulty walking (30 Aug 2020 17:09)    SUBJECTIVE / OVERNIGHT EVENTS: pt reporting poor sleep overnight due to neighbor talking. Still has LE edema up to thighs. Frustrated about clinical course.     MEDICATIONS  (STANDING):  acetaminophen  IVPB .. 1000 milliGRAM(s) IV Intermittent once  albuterol/ipratropium for Nebulization 3 milliLiter(s) Nebulizer every 6 hours  apixaban 5 milliGRAM(s) Oral every 12 hours  ascorbic acid 500 milliGRAM(s) Oral daily  aspirin enteric coated 81 milliGRAM(s) Oral daily  atorvastatin 80 milliGRAM(s) Oral at bedtime  azithromycin   Tablet 250 milliGRAM(s) Oral <User Schedule>  Biotene Dry Mouth Oral Rinse 5 milliLiter(s) Swish and Spit two times a day  bisacodyl Suppository 10 milliGRAM(s) Rectal daily  budesonide 160 MICROgram(s)/formoterol 4.5 MICROgram(s) Inhaler 2 Puff(s) Inhalation two times a day  buMETAnide Injectable 2 milliGRAM(s) IV Push two times a day  ceFAZolin   IVPB      ceFAZolin   IVPB 1000 milliGRAM(s) IV Intermittent every 8 hours  chlorhexidine 2% Cloths 1 Application(s) Topical daily  cholecalciferol 2000 Unit(s) Oral daily  dextrose 5%. 1000 milliLiter(s) (50 mL/Hr) IV Continuous <Continuous>  dextrose 50% Injectable 25 Gram(s) IV Push once  diltiazem    milliGRAM(s) Oral daily  ferrous    sulfate 325 milliGRAM(s) Oral two times a day  insulin glargine Injectable (LANTUS) 16 Unit(s) SubCutaneous at bedtime  insulin lispro (HumaLOG) corrective regimen sliding scale   SubCutaneous three times a day before meals  insulin lispro (HumaLOG) corrective regimen sliding scale   SubCutaneous at bedtime  insulin lispro Injectable (HumaLOG) 4 Unit(s) SubCutaneous three times a day with meals  lactulose Syrup 15 Gram(s) Oral two times a day  montelukast 10 milliGRAM(s) Oral daily  multivitamin 1 Tablet(s) Oral daily  mupirocin 2% Ointment 1 Application(s) Topical <User Schedule>  pantoprazole    Tablet 40 milliGRAM(s) Oral before breakfast  polyethylene glycol 3350 17 Gram(s) Oral daily  senna 2 Tablet(s) Oral at bedtime  theophylline ER (24 Hour) 400 milliGRAM(s) Oral daily  tiotropium 18 MICROgram(s) Capsule 1 Capsule(s) Inhalation daily    MEDICATIONS  (PRN):  glucagon  Injectable 1 milliGRAM(s) IntraMuscular once PRN Glucose LESS THAN 70 milligrams/deciliter  guaiFENesin   Syrup  (Sugar-Free) 100 milliGRAM(s) Oral every 6 hours PRN Cough  oxyCODONE    IR 5 milliGRAM(s) Oral every 6 hours PRN Severe Pain (7 - 10)  traMADol 25 milliGRAM(s) Oral every 6 hours PRN Mild Pain (1 - 3)      Vital Signs Last 24 Hrs  T(C): 36.6 (31 Aug 2020 09:00), Max: 36.8 (30 Aug 2020 16:09)  T(F): 97.8 (31 Aug 2020 09:00), Max: 98.3 (30 Aug 2020 16:09)  HR: 84 (31 Aug 2020 09:13) (81 - 94)  BP: 131/68 (31 Aug 2020 09:00) (127/63 - 137/70)  BP(mean): --  RR: 18 (31 Aug 2020 09:00) (18 - 18)  SpO2: 96% (31 Aug 2020 09:13) (93% - 100%)  CAPILLARY BLOOD GLUCOSE      POCT Blood Glucose.: 168 mg/dL (31 Aug 2020 09:26)  POCT Blood Glucose.: 102 mg/dL (30 Aug 2020 21:29)  POCT Blood Glucose.: 116 mg/dL (30 Aug 2020 17:45)  POCT Blood Glucose.: 117 mg/dL (30 Aug 2020 14:45)    I&O's Summary    30 Aug 2020 07:01  -  31 Aug 2020 07:00  --------------------------------------------------------  IN: 0 mL / OUT: 1550 mL / NET: -1550 mL    31 Aug 2020 07:01  -  31 Aug 2020 11:57  --------------------------------------------------------  IN: 120 mL / OUT: 500 mL / NET: -380 mL      PHYSICAL EXAM:  GENERAL: NAD  EYES:  conjunctiva and sclera clear  CHEST/LUNG: no wheezing b/l   HEART: +S1/S2, reg   ABDOMEN: Soft, Nontender, Nondistended  EXTREMITIES:  +2 pitting edema from feet to thighs and hips b/l  PSYCH: AAOx3    LABS:                        8.2    8.29  )-----------( 184      ( 31 Aug 2020 09:41 )             29.2     08-31    138  |  92<L>  |  21  ----------------------------<  80  4.8   |  40<H>  |  1.01    Ca    10.3      31 Aug 2020 09:41    TPro  6.1  /  Alb  3.6  /  TBili  0.2  /  DBili  x   /  AST  26  /  ALT  13  /  AlkPhos  65  08-31

## 2020-08-31 NOTE — PROGRESS NOTE ADULT - ASSESSMENT
EVAN 1/7/19: no ALINA thrombus, unable to assess LV sys fx  echo 12/7/18: EF 71%, nl LV sys fx, mild diastolic dysfx     a/p    60 year old female with hx of D CHF, HTN, CAD s/p PCI, Afib/ aflutter, s/p Aflutter Ablation 12/2019, COPD, DM2, Anxiety, , raynaud phenomenon presenting with weakness, SOB , hyperkalemia.      1. Dyspnea, Resp failure  -secondary to chronic lung disease, COPD, volume overload  -pulm f/u   -covid 19 negative  -no acs , cv stable   -ecg with known RBBB, new twi noted, hyperkalemia also noted on admission  -echo with normal LVEF, mild diastolic dysfunction   -s/p PO Prednisone  -on Duoneb, Symbicort, Spiriva and Theophylline.    2. CAD s/p PCI   -stable, no cp  -c/w ASA, statin  -echo noted above     3. Afib/aflutter s/p  Aflutter Ablation 12/2019  -in NSR, cw cardizem  mg daily  -CHADsVac=2, c/w eliquis     4. Acute on Chronic Diastolic CHF   -dyspnea mostly secondary to copd  -intermittently on bipap   -echo with normal lVEF   -cont IV bumex  -closely monitor bicarb, Cr stable but CO2 uptrending, ABG pending     dvt ppx

## 2020-08-31 NOTE — PROGRESS NOTE ADULT - SUBJECTIVE AND OBJECTIVE BOX
60y old  Female who presents with a chief complaint of 60F p/w generalized weakness and difficulty walking (31 Aug 2020 14:21)      Interval history:  Afebrile, persistent swelling and blistering of feet.     Allergies:   albuterol (Unknown)  No Known Allergies      Antimicrobials:  azithromycin   Tablet 250 milliGRAM(s) Oral <User Schedule>  ceFAZolin   IVPB 1000 milliGRAM(s) IV Intermittent every 8 hours      REVIEW OF SYSTEMS:  No chest pain   No N/V  No dysuria      Vital Signs Last 24 Hrs  T(C): 36.6 (08-31-20 @ 16:00), Max: 36.6 (08-31-20 @ 09:00)  T(F): 97.8 (08-31-20 @ 16:00), Max: 97.8 (08-31-20 @ 09:00)  HR: 87 (08-31-20 @ 16:04) (79 - 94)  BP: 117/62 (08-31-20 @ 16:00) (117/62 - 131/68)  BP(mean): --  RR: 18 (08-31-20 @ 16:00) (18 - 18)  SpO2: 100% (08-31-20 @ 16:04) (96% - 100%)      PHYSICAL EXAM:  Patient in no acute distress. AAOX3.  No icterus, no oral ulcers.  Cardiovascular: S1S2 normal.  Lungs: decreased air entry B/L lung fields.  Gastrointestinal: soft, nontender, nondistended.  Extremities: ++ edema b/l lower extremities. B/L Foot swelling with blistering, erythema of the foot around the scab, erythema of the LLE, but no warmth.   IV sites not inflamed.                           8.2    8.29  )-----------( 184      ( 31 Aug 2020 09:41 )             29.2   08-31    138  |  92<L>  |  21  ----------------------------<  80  4.8   |  40<H>  |  1.01    Ca    10.3      31 Aug 2020 09:41    TPro  6.1  /  Alb  3.6  /  TBili  0.2  /  DBili  x   /  AST  26  /  ALT  13  /  AlkPhos  65  08-31      LIVER FUNCTIONS - ( 31 Aug 2020 09:41 )  Alb: 3.6 g/dL / Pro: 6.1 g/dL / ALK PHOS: 65 U/L / ALT: 13 U/L / AST: 26 U/L / GGT: x

## 2020-08-31 NOTE — PROGRESS NOTE ADULT - PROBLEM SELECTOR PLAN 6
-Hb slowly drifting down since admission, likely exacerbated by prolonged hospitalization and frequent blood draws.   -Low serum iron.   -Transfused 8/29 w improvement.

## 2020-08-31 NOTE — PROGRESS NOTE ADULT - ASSESSMENT
60F w COPD on 3LNC (?pending transplant list at [facility]), former smoker, CAD s/p stent (2016), afib on eliquis, uncontrolled DM2, raynaud phenomenon and recent hospitalization at [facility] for altered mental status and [facility] aw COPD exacerbation, LE swelling, weakness.     Prolonged hospitalization.

## 2020-08-31 NOTE — PROGRESS NOTE ADULT - SUBJECTIVE AND OBJECTIVE BOX
Patient is a 60y old  Female who presents with a chief complaint of 60F p/w generalized weakness and difficulty walking (31 Aug 2020 12:21)       INTERVAL HPI/OVERNIGHT EVENTS:  Patient seen and evaluated at bedside.  Pt is resting comfortable in NAD.     Allergies    No Known Allergies    Intolerances    albuterol (Unknown)      Vital Signs Last 24 Hrs  T(C): 36.6 (31 Aug 2020 09:00), Max: 36.8 (30 Aug 2020 16:09)  T(F): 97.8 (31 Aug 2020 09:00), Max: 98.3 (30 Aug 2020 16:09)  HR: 84 (31 Aug 2020 09:13) (81 - 94)  BP: 131/68 (31 Aug 2020 09:00) (127/63 - 137/70)  BP(mean): --  RR: 18 (31 Aug 2020 09:00) (18 - 18)  SpO2: 96% (31 Aug 2020 09:13) (93% - 100%)    LABS:                        8.2    8.29  )-----------( 184      ( 31 Aug 2020 09:41 )             29.2     08-31    138  |  92<L>  |  21  ----------------------------<  80  4.8   |  40<H>  |  1.01    Ca    10.3      31 Aug 2020 09:41    TPro  6.1  /  Alb  3.6  /  TBili  0.2  /  DBili  x   /  AST  26  /  ALT  13  /  AlkPhos  65  08-31        CAPILLARY BLOOD GLUCOSE      POCT Blood Glucose.: 168 mg/dL (31 Aug 2020 09:26)  POCT Blood Glucose.: 102 mg/dL (30 Aug 2020 21:29)  POCT Blood Glucose.: 116 mg/dL (30 Aug 2020 17:45)  POCT Blood Glucose.: 117 mg/dL (30 Aug 2020 14:45)      Lower Extremity Physical Exam:  Vascular: DP/PT n/p, B/L with known h/o raynauds, CFT <_5 seconds B/L, Temperature gradient _warm to cool, B/L.   Neuro: Epicritic sensation intact to the level of _toes, B/L.  Skin: Postive mycotic toenails x10  Wound #1:   Location: dorsum left forefoot  Size: 6cm diameter  Depth: superficial  Wound bed: bullae  Drainage:  clear serous fluid/fluctuant  Odor: none  Periwound: no clinical signs of infection  Etiology: bullae/dm  Erythema increased to the left foot	     Location: dorsum right forefoot  Size: 5cm diameter  Depth: superficial  Wound bed: bullae  Drainage:  clear serous fluid/fluctuant  Odor: none  Periwound: no clinical signs of infection  Etiology: bullae/dm

## 2020-09-01 LAB
ANION GAP SERPL CALC-SCNC: 4 MMOL/L — LOW (ref 5–17)
BASE EXCESS BLDA CALC-SCNC: 14.3 MMOL/L — HIGH (ref -2–2)
BUN SERPL-MCNC: 21 MG/DL — SIGNIFICANT CHANGE UP (ref 7–23)
CALCIUM SERPL-MCNC: 10.2 MG/DL — SIGNIFICANT CHANGE UP (ref 8.4–10.5)
CHLORIDE SERPL-SCNC: 95 MMOL/L — LOW (ref 96–108)
CO2 BLDA-SCNC: 43 MMOL/L — HIGH (ref 22–30)
CO2 SERPL-SCNC: 43 MMOL/L — HIGH (ref 22–31)
CREAT SERPL-MCNC: 1.09 MG/DL — SIGNIFICANT CHANGE UP (ref 0.5–1.3)
GLUCOSE BLDC GLUCOMTR-MCNC: 118 MG/DL — HIGH (ref 70–99)
GLUCOSE BLDC GLUCOMTR-MCNC: 161 MG/DL — HIGH (ref 70–99)
GLUCOSE BLDC GLUCOMTR-MCNC: 90 MG/DL — SIGNIFICANT CHANGE UP (ref 70–99)
GLUCOSE SERPL-MCNC: 70 MG/DL — SIGNIFICANT CHANGE UP (ref 70–99)
HCO3 BLDA-SCNC: 41 MMOL/L — HIGH (ref 21–29)
HCT VFR BLD CALC: 28.8 % — LOW (ref 34.5–45)
HGB BLD-MCNC: 8.2 G/DL — LOW (ref 11.5–15.5)
MCHC RBC-ENTMCNC: 24.6 PG — LOW (ref 27–34)
MCHC RBC-ENTMCNC: 28.5 GM/DL — LOW (ref 32–36)
MCV RBC AUTO: 86.2 FL — SIGNIFICANT CHANGE UP (ref 80–100)
NRBC # BLD: 0 /100 WBCS — SIGNIFICANT CHANGE UP (ref 0–0)
PCO2 BLDA: 68 MMHG — HIGH (ref 32–46)
PH BLDA: 7.4 — SIGNIFICANT CHANGE UP (ref 7.35–7.45)
PLATELET # BLD AUTO: 197 K/UL — SIGNIFICANT CHANGE UP (ref 150–400)
PO2 BLDA: 90 MMHG — SIGNIFICANT CHANGE UP (ref 74–108)
POTASSIUM SERPL-MCNC: 4.6 MMOL/L — SIGNIFICANT CHANGE UP (ref 3.5–5.3)
POTASSIUM SERPL-SCNC: 4.6 MMOL/L — SIGNIFICANT CHANGE UP (ref 3.5–5.3)
RBC # BLD: 3.34 M/UL — LOW (ref 3.8–5.2)
RBC # FLD: 17.2 % — HIGH (ref 10.3–14.5)
SAO2 % BLDA: 97 % — HIGH (ref 92–96)
SODIUM SERPL-SCNC: 142 MMOL/L — SIGNIFICANT CHANGE UP (ref 135–145)
WBC # BLD: 7.3 K/UL — SIGNIFICANT CHANGE UP (ref 3.8–10.5)
WBC # FLD AUTO: 7.3 K/UL — SIGNIFICANT CHANGE UP (ref 3.8–10.5)

## 2020-09-01 PROCEDURE — 99233 SBSQ HOSP IP/OBS HIGH 50: CPT

## 2020-09-01 RX ADMIN — TIOTROPIUM BROMIDE 1 CAPSULE(S): 18 CAPSULE ORAL; RESPIRATORY (INHALATION) at 13:15

## 2020-09-01 RX ADMIN — PANTOPRAZOLE SODIUM 40 MILLIGRAM(S): 20 TABLET, DELAYED RELEASE ORAL at 05:06

## 2020-09-01 RX ADMIN — Medication 2000 UNIT(S): at 13:18

## 2020-09-01 RX ADMIN — BUMETANIDE 2 MILLIGRAM(S): 0.25 INJECTION INTRAMUSCULAR; INTRAVENOUS at 18:44

## 2020-09-01 RX ADMIN — Medication 325 MILLIGRAM(S): at 18:44

## 2020-09-01 RX ADMIN — Medication 3 MILLILITER(S): at 18:44

## 2020-09-01 RX ADMIN — Medication 1: at 10:00

## 2020-09-01 RX ADMIN — Medication 400 MILLIGRAM(S): at 13:15

## 2020-09-01 RX ADMIN — MUPIROCIN 1 APPLICATION(S): 20 OINTMENT TOPICAL at 18:45

## 2020-09-01 RX ADMIN — ATORVASTATIN CALCIUM 80 MILLIGRAM(S): 80 TABLET, FILM COATED ORAL at 22:25

## 2020-09-01 RX ADMIN — INSULIN GLARGINE 16 UNIT(S): 100 INJECTION, SOLUTION SUBCUTANEOUS at 22:25

## 2020-09-01 RX ADMIN — APIXABAN 5 MILLIGRAM(S): 2.5 TABLET, FILM COATED ORAL at 05:09

## 2020-09-01 RX ADMIN — SENNA PLUS 2 TABLET(S): 8.6 TABLET ORAL at 22:25

## 2020-09-01 RX ADMIN — Medication 4 UNIT(S): at 19:34

## 2020-09-01 RX ADMIN — BUMETANIDE 2 MILLIGRAM(S): 0.25 INJECTION INTRAMUSCULAR; INTRAVENOUS at 05:04

## 2020-09-01 RX ADMIN — BUDESONIDE AND FORMOTEROL FUMARATE DIHYDRATE 2 PUFF(S): 160; 4.5 AEROSOL RESPIRATORY (INHALATION) at 05:03

## 2020-09-01 RX ADMIN — Medication 180 MILLIGRAM(S): at 05:06

## 2020-09-01 RX ADMIN — Medication 1 TABLET(S): at 13:16

## 2020-09-01 RX ADMIN — Medication 3 MILLILITER(S): at 13:12

## 2020-09-01 RX ADMIN — Medication 100 MILLIGRAM(S): at 15:06

## 2020-09-01 RX ADMIN — BUDESONIDE AND FORMOTEROL FUMARATE DIHYDRATE 2 PUFF(S): 160; 4.5 AEROSOL RESPIRATORY (INHALATION) at 18:45

## 2020-09-01 RX ADMIN — Medication 4 UNIT(S): at 15:05

## 2020-09-01 RX ADMIN — Medication 5 MILLILITER(S): at 05:04

## 2020-09-01 RX ADMIN — Medication 100 MILLIGRAM(S): at 22:25

## 2020-09-01 RX ADMIN — CHLORHEXIDINE GLUCONATE 1 APPLICATION(S): 213 SOLUTION TOPICAL at 13:27

## 2020-09-01 RX ADMIN — Medication 325 MILLIGRAM(S): at 05:07

## 2020-09-01 RX ADMIN — APIXABAN 5 MILLIGRAM(S): 2.5 TABLET, FILM COATED ORAL at 18:44

## 2020-09-01 RX ADMIN — Medication 100 MILLIGRAM(S): at 05:09

## 2020-09-01 RX ADMIN — MONTELUKAST 10 MILLIGRAM(S): 4 TABLET, CHEWABLE ORAL at 13:16

## 2020-09-01 RX ADMIN — Medication 3 MILLILITER(S): at 05:03

## 2020-09-01 RX ADMIN — Medication 81 MILLIGRAM(S): at 13:15

## 2020-09-01 RX ADMIN — Medication 5 MILLILITER(S): at 20:15

## 2020-09-01 RX ADMIN — Medication 500 MILLIGRAM(S): at 13:15

## 2020-09-01 RX ADMIN — POLYETHYLENE GLYCOL 3350 17 GRAM(S): 17 POWDER, FOR SOLUTION ORAL at 13:18

## 2020-09-01 RX ADMIN — Medication 4 UNIT(S): at 10:01

## 2020-09-01 NOTE — PROGRESS NOTE ADULT - SUBJECTIVE AND OBJECTIVE BOX
Patient is a 60y old  Female who presents with a chief complaint of 60F p/w generalized weakness and difficulty walking (01 Sep 2020 11:38)      SUBJECTIVE / OVERNIGHT EVENTS: no acute events overnight, pt complaining of LE edema.     MEDICATIONS  (STANDING):  acetaminophen  IVPB .. 1000 milliGRAM(s) IV Intermittent once  albuterol/ipratropium for Nebulization 3 milliLiter(s) Nebulizer every 6 hours  apixaban 5 milliGRAM(s) Oral every 12 hours  ascorbic acid 500 milliGRAM(s) Oral daily  aspirin enteric coated 81 milliGRAM(s) Oral daily  atorvastatin 80 milliGRAM(s) Oral at bedtime  azithromycin   Tablet 250 milliGRAM(s) Oral <User Schedule>  Biotene Dry Mouth Oral Rinse 5 milliLiter(s) Swish and Spit two times a day  bisacodyl Suppository 10 milliGRAM(s) Rectal daily  budesonide 160 MICROgram(s)/formoterol 4.5 MICROgram(s) Inhaler 2 Puff(s) Inhalation two times a day  buMETAnide Injectable 2 milliGRAM(s) IV Push two times a day  ceFAZolin   IVPB      ceFAZolin   IVPB 1000 milliGRAM(s) IV Intermittent every 8 hours  chlorhexidine 2% Cloths 1 Application(s) Topical daily  cholecalciferol 2000 Unit(s) Oral daily  dextrose 5%. 1000 milliLiter(s) (50 mL/Hr) IV Continuous <Continuous>  dextrose 50% Injectable 25 Gram(s) IV Push once  diltiazem    milliGRAM(s) Oral daily  ferrous    sulfate 325 milliGRAM(s) Oral two times a day  insulin glargine Injectable (LANTUS) 16 Unit(s) SubCutaneous at bedtime  insulin lispro (HumaLOG) corrective regimen sliding scale   SubCutaneous three times a day before meals  insulin lispro (HumaLOG) corrective regimen sliding scale   SubCutaneous at bedtime  insulin lispro Injectable (HumaLOG) 4 Unit(s) SubCutaneous three times a day with meals  lactulose Syrup 15 Gram(s) Oral two times a day  montelukast 10 milliGRAM(s) Oral daily  multivitamin 1 Tablet(s) Oral daily  mupirocin 2% Ointment 1 Application(s) Topical <User Schedule>  pantoprazole    Tablet 40 milliGRAM(s) Oral before breakfast  polyethylene glycol 3350 17 Gram(s) Oral daily  senna 2 Tablet(s) Oral at bedtime  theophylline ER (24 Hour) 400 milliGRAM(s) Oral daily  tiotropium 18 MICROgram(s) Capsule 1 Capsule(s) Inhalation daily    MEDICATIONS  (PRN):  glucagon  Injectable 1 milliGRAM(s) IntraMuscular once PRN Glucose LESS THAN 70 milligrams/deciliter  guaiFENesin   Syrup  (Sugar-Free) 100 milliGRAM(s) Oral every 6 hours PRN Cough  oxyCODONE    IR 5 milliGRAM(s) Oral every 6 hours PRN Severe Pain (7 - 10)  traMADol 25 milliGRAM(s) Oral every 6 hours PRN Mild Pain (1 - 3)      Vital Signs Last 24 Hrs  T(C): 36.8 (01 Sep 2020 00:33), Max: 36.8 (01 Sep 2020 00:33)  T(F): 98.2 (01 Sep 2020 00:33), Max: 98.2 (01 Sep 2020 00:33)  HR: 92 (01 Sep 2020 06:02) (79 - 101)  BP: 156/69 (01 Sep 2020 04:59) (117/62 - 156/69)  BP(mean): --  RR: 21 (01 Sep 2020 04:59) (18 - 21)  SpO2: 100% (01 Sep 2020 06:02) (92% - 100%)  CAPILLARY BLOOD GLUCOSE      POCT Blood Glucose.: 161 mg/dL (01 Sep 2020 09:14)  POCT Blood Glucose.: 176 mg/dL (31 Aug 2020 21:39)  POCT Blood Glucose.: 105 mg/dL (31 Aug 2020 18:42)  POCT Blood Glucose.: 143 mg/dL (31 Aug 2020 14:36)    I&O's Summary    31 Aug 2020 07:01  -  01 Sep 2020 07:00  --------------------------------------------------------  IN: 723 mL / OUT: 1525 mL / NET: -802 mL    01 Sep 2020 07:01  -  01 Sep 2020 12:16  --------------------------------------------------------  IN: 0 mL / OUT: 550 mL / NET: -550 mL        PHYSICAL EXAM:  GENERAL: NAD  EYES:  conjunctiva and sclera clear  CHEST/LUNG: decreased b/l lung sounds, no overt wheezing   HEART: +S1/S2, reg   ABDOMEN: Soft, Nontender, Nondistended  EXTREMITIES: +2 pitting edema b/l in the LE up to the hips, ACE dressings in place   PSYCH: AAOx3    LABS:                        8.2    7.30  )-----------( 197      ( 01 Sep 2020 09:15 )             28.8     08-31    138  |  92<L>  |  21  ----------------------------<  80  4.8   |  40<H>  |  1.01    Ca    10.3      31 Aug 2020 09:41    TPro  6.1  /  Alb  3.6  /  TBili  0.2  /  DBili  x   /  AST  26  /  ALT  13  /  AlkPhos  65  08-31

## 2020-09-01 NOTE — PROGRESS NOTE ADULT - ASSESSMENT
EVAN 1/7/19: no ALINA thrombus, unable to assess LV sys fx  echo 12/7/18: EF 71%, nl LV sys fx, mild diastolic dysfx     a/p    60 year old female with hx of D CHF, HTN, CAD s/p PCI, Afib/ aflutter, s/p Aflutter Ablation 12/2019, COPD, DM2, Anxiety, , raynaud phenomenon presenting with weakness, SOB , hyperkalemia.      1. Dyspnea, Resp failure  -secondary to chronic lung disease, COPD, volume overload  -pulm f/u   -covid 19 negative  -no acs , cv stable   -ecg with known RBBB, new twi noted, hyperkalemia also noted on admission  -echo with normal LVEF, mild diastolic dysfunction   -s/p PO Prednisone  -on Duoneb, Symbicort, Spiriva and Theophylline.    2. CAD s/p PCI   -stable, no cp  -c/w ASA, statin  -echo noted above     3. Afib/aflutter s/p  Aflutter Ablation 12/2019  -in NSR, cw cardizem  mg daily  -CHADsVac=2, c/w eliquis     4. Acute on Chronic Diastolic CHF   -dyspnea mostly secondary to copd  -intermittently on bipap   -echo with normal lVEF   -cont IV bumex  -closely monitor bicarb, Cr stable but CO2 uptrending     dvt ppx

## 2020-09-01 NOTE — PROGRESS NOTE ADULT - PROBLEM SELECTOR PLAN 2
-Bumex increased on 8/28. Monitor  -net negative 380ml overnight   -Cr stable but CO2 uptrending, will check ABG if CO2 is still elevated on BMP today

## 2020-09-01 NOTE — PROGRESS NOTE ADULT - SUBJECTIVE AND OBJECTIVE BOX
CARDIOLOGY FOLLOW UP - Dr. Brunson    CC  no acute complaints  resting comfortably currently on bipap       PHYSICAL EXAM:  T(C): 36.8 (09-01-20 @ 00:33), Max: 36.8 (09-01-20 @ 00:33)  HR: 92 (09-01-20 @ 06:02) (79 - 101)  BP: 156/69 (09-01-20 @ 04:59) (117/62 - 156/69)  RR: 21 (09-01-20 @ 04:59) (18 - 21)  SpO2: 100% (09-01-20 @ 06:02) (92% - 100%)  Wt(kg): --  I&O's Summary    31 Aug 2020 07:01  -  01 Sep 2020 07:00  --------------------------------------------------------  IN: 723 mL / OUT: 1525 mL / NET: -802 mL    01 Sep 2020 07:01  -  01 Sep 2020 11:38  --------------------------------------------------------  IN: 0 mL / OUT: 550 mL / NET: -550 mL        Appearance: Normal	  Cardiovascular: Normal S1 S2,RRR, No JVD, No murmurs  Respiratory: diminished   Gastrointestinal:  Soft, Non-tender, + BS	  Extremities: Normal range of motion, No clubbing, b/l le edema ++         MEDICATIONS  (STANDING):  acetaminophen  IVPB .. 1000 milliGRAM(s) IV Intermittent once  albuterol/ipratropium for Nebulization 3 milliLiter(s) Nebulizer every 6 hours  apixaban 5 milliGRAM(s) Oral every 12 hours  ascorbic acid 500 milliGRAM(s) Oral daily  aspirin enteric coated 81 milliGRAM(s) Oral daily  atorvastatin 80 milliGRAM(s) Oral at bedtime  azithromycin   Tablet 250 milliGRAM(s) Oral <User Schedule>  Biotene Dry Mouth Oral Rinse 5 milliLiter(s) Swish and Spit two times a day  bisacodyl Suppository 10 milliGRAM(s) Rectal daily  budesonide 160 MICROgram(s)/formoterol 4.5 MICROgram(s) Inhaler 2 Puff(s) Inhalation two times a day  buMETAnide Injectable 2 milliGRAM(s) IV Push two times a day  ceFAZolin   IVPB      ceFAZolin   IVPB 1000 milliGRAM(s) IV Intermittent every 8 hours  chlorhexidine 2% Cloths 1 Application(s) Topical daily  cholecalciferol 2000 Unit(s) Oral daily  dextrose 5%. 1000 milliLiter(s) (50 mL/Hr) IV Continuous <Continuous>  dextrose 50% Injectable 25 Gram(s) IV Push once  diltiazem    milliGRAM(s) Oral daily  ferrous    sulfate 325 milliGRAM(s) Oral two times a day  insulin glargine Injectable (LANTUS) 16 Unit(s) SubCutaneous at bedtime  insulin lispro (HumaLOG) corrective regimen sliding scale   SubCutaneous three times a day before meals  insulin lispro (HumaLOG) corrective regimen sliding scale   SubCutaneous at bedtime  insulin lispro Injectable (HumaLOG) 4 Unit(s) SubCutaneous three times a day with meals  lactulose Syrup 15 Gram(s) Oral two times a day  montelukast 10 milliGRAM(s) Oral daily  multivitamin 1 Tablet(s) Oral daily  mupirocin 2% Ointment 1 Application(s) Topical <User Schedule>  pantoprazole    Tablet 40 milliGRAM(s) Oral before breakfast  polyethylene glycol 3350 17 Gram(s) Oral daily  senna 2 Tablet(s) Oral at bedtime  theophylline ER (24 Hour) 400 milliGRAM(s) Oral daily  tiotropium 18 MICROgram(s) Capsule 1 Capsule(s) Inhalation daily      TELEMETRY: 	    ECG:  	  RADIOLOGY:   DIAGNOSTIC TESTING:  [ ] Echocardiogram:  [ ]  Catheterization:  [ ] Stress Test:    OTHER: 	    LABS:	 	                                8.2    7.30  )-----------( 197      ( 01 Sep 2020 09:15 )             28.8     08-31    138  |  92<L>  |  21  ----------------------------<  80  4.8   |  40<H>  |  1.01    Ca    10.3      31 Aug 2020 09:41    TPro  6.1  /  Alb  3.6  /  TBili  0.2  /  DBili  x   /  AST  26  /  ALT  13  /  AlkPhos  65  08-31

## 2020-09-01 NOTE — PROGRESS NOTE ADULT - PROBLEM SELECTOR PLAN 5
-w erythema. C/w cefazolin for possible cellulitis, day 5/7 today   -Pt feels b/l foot swelling is getting worse, podiatry re-evaluated 8/31  -continue with daily mupirocin with adaptic and dsd to left forefoot daily, ACE compression b/l if pt tolerates

## 2020-09-01 NOTE — PROGRESS NOTE ADULT - PROBLEM SELECTOR PLAN 1
-Dyspnea multifactorial in the setting of underlying lung disease, cardiovascular dysfunction, obesity, and deconditioning.   -Was given prolonged course of Prednisone, now tapered off. Overnight and prn Bipap. Pt comfortable off Bipap and able to speak in full sentences.   - cont azithro, duonebs, spiriva, symbicort, singulair  -Continue supplemental O2 with goal O2 sat > 88% - she does not need to be at 100%.  -Pulm to re-eval today

## 2020-09-01 NOTE — PROGRESS NOTE ADULT - ATTENDING COMMENTS
59 y/o F w/chronic respiratory failure w/hypoxia and hypercapnia secondary to combination of HFpEF and COPD on home O2 admitted for acute on chronic respiratory failure with hypoxia and hypercapnia likely secondary to COPD exacerbation and acute pulmonary edema secondary to acute on chronic HFpEF.    1.  Acute on chronic hypoxic/hypercapnic respiratory failure-  -Clinically, patient appears dyspneic requiring BiPAP for support  -She has very severe COPD from PFTs that were performed 2 years ago and there is likely disease progression  -Cont bronchodilators and theophylline  -Diuretics as per cardiology, recs appreciated, would cont strict I/Os  -Continue supplemental O2  -Pain control for lower extremity edema and blisters  -Consider palliative consultation as patient has end stage COPD and would benefit from symptomatic management    No further pulmonary intervention at this point.  Will sign off.  Call with questions.

## 2020-09-01 NOTE — PROGRESS NOTE ADULT - ASSESSMENT
60F w COPD on 3LNC (?pending transplant list at [facility]), former smoker, CAD s/p stent (2016), afib on eliquis, uncontrolled DM2, raynaud phenomenon and recent hospitalization at [facility] for altered mental status and [facility] aw COPD exacerbation, LE swelling, weakness.     Prolonged hospitalization.

## 2020-09-01 NOTE — PROGRESS NOTE ADULT - SUBJECTIVE AND OBJECTIVE BOX
Interval Events:  Patient still with dyspnea and is requiring intermittent BiPAP throughout the day    REVIEW OF SYSTEMS:  Constitutional:   Eyes:  ENT:  CV:  Resp: sob  GI:  :  MSK:  Integumentary:  Neurological:  Psychiatric:  Endocrine:  Hematologic/Lymphatic:  Allergic/Immunologic:  [x] All other systems negative  [ ] Unable to assess ROS because ________    OBJECTIVE:  ICU Vital Signs Last 24 Hrs  T(C): 36.8 (01 Sep 2020 00:33), Max: 36.8 (01 Sep 2020 00:33)  T(F): 98.2 (01 Sep 2020 00:33), Max: 98.2 (01 Sep 2020 00:33)  HR: 92 (01 Sep 2020 06:02) (80 - 101)  BP: 156/69 (01 Sep 2020 04:59) (137/74 - 156/69)  BP(mean): --  ABP: --  ABP(mean): --  RR: 21 (01 Sep 2020 04:59) (18 - 21)  SpO2: 100% (01 Sep 2020 06:02) (92% - 100%)        08-31 @ 07:01 - 09-01 @ 07:00  --------------------------------------------------------  IN: 723 mL / OUT: 1525 mL / NET: -802 mL    09-01 @ 07:01 - 09-01 @ 16:18  --------------------------------------------------------  IN: 0 mL / OUT: 550 mL / NET: -550 mL      CAPILLARY BLOOD GLUCOSE      POCT Blood Glucose.: 88 mg/dL (01 Sep 2020 14:53)      PHYSICAL EXAM:  General: AAOx3  HEENT: EOMI, PERRL  Lymph Nodes: No LAD  Neck: Supple  Respiratory: decreased breath sounds bilaterally  Cardiovascular: RRR   Abdomen: soft, NT/ND  Extremities: 1+ edema with bandages present    HOSPITAL MEDICATIONS:  MEDICATIONS  (STANDING):  acetaminophen  IVPB .. 1000 milliGRAM(s) IV Intermittent once  albuterol/ipratropium for Nebulization 3 milliLiter(s) Nebulizer every 6 hours  apixaban 5 milliGRAM(s) Oral every 12 hours  ascorbic acid 500 milliGRAM(s) Oral daily  aspirin enteric coated 81 milliGRAM(s) Oral daily  atorvastatin 80 milliGRAM(s) Oral at bedtime  azithromycin   Tablet 250 milliGRAM(s) Oral <User Schedule>  Biotene Dry Mouth Oral Rinse 5 milliLiter(s) Swish and Spit two times a day  bisacodyl Suppository 10 milliGRAM(s) Rectal daily  budesonide 160 MICROgram(s)/formoterol 4.5 MICROgram(s) Inhaler 2 Puff(s) Inhalation two times a day  buMETAnide Injectable 2 milliGRAM(s) IV Push two times a day  ceFAZolin   IVPB      ceFAZolin   IVPB 1000 milliGRAM(s) IV Intermittent every 8 hours  chlorhexidine 2% Cloths 1 Application(s) Topical daily  cholecalciferol 2000 Unit(s) Oral daily  dextrose 5%. 1000 milliLiter(s) (50 mL/Hr) IV Continuous <Continuous>  dextrose 50% Injectable 25 Gram(s) IV Push once  diltiazem    milliGRAM(s) Oral daily  ferrous    sulfate 325 milliGRAM(s) Oral two times a day  insulin glargine Injectable (LANTUS) 16 Unit(s) SubCutaneous at bedtime  insulin lispro (HumaLOG) corrective regimen sliding scale   SubCutaneous three times a day before meals  insulin lispro (HumaLOG) corrective regimen sliding scale   SubCutaneous at bedtime  insulin lispro Injectable (HumaLOG) 4 Unit(s) SubCutaneous three times a day with meals  lactulose Syrup 15 Gram(s) Oral two times a day  montelukast 10 milliGRAM(s) Oral daily  multivitamin 1 Tablet(s) Oral daily  mupirocin 2% Ointment 1 Application(s) Topical <User Schedule>  pantoprazole    Tablet 40 milliGRAM(s) Oral before breakfast  polyethylene glycol 3350 17 Gram(s) Oral daily  senna 2 Tablet(s) Oral at bedtime  theophylline ER (24 Hour) 400 milliGRAM(s) Oral daily  tiotropium 18 MICROgram(s) Capsule 1 Capsule(s) Inhalation daily    MEDICATIONS  (PRN):  glucagon  Injectable 1 milliGRAM(s) IntraMuscular once PRN Glucose LESS THAN 70 milligrams/deciliter  guaiFENesin   Syrup  (Sugar-Free) 100 milliGRAM(s) Oral every 6 hours PRN Cough  oxyCODONE    IR 5 milliGRAM(s) Oral every 6 hours PRN Severe Pain (7 - 10)  traMADol 25 milliGRAM(s) Oral every 6 hours PRN Mild Pain (1 - 3)      LABS:                        8.2    7.30  )-----------( 197      ( 01 Sep 2020 09:15 )             28.8     09-01    142  |  95<L>  |  21  ----------------------------<  70  4.6   |  43<H>  |  1.09    Ca    10.2      01 Sep 2020 12:26    TPro  6.1  /  Alb  3.6  /  TBili  0.2  /  DBili  x   /  AST  26  /  ALT  13  /  AlkPhos  65  08-31              RADIOLOGY:  [x] Reviewed and interpreted by me

## 2020-09-01 NOTE — CHART NOTE - NSCHARTNOTEFT_GEN_A_CORE
Nutrition follow up     Hospital course as per chart: Pt 61 y/o F with PMH: COPD, former smoker, CAD S/P stent (2016), Afib on Eliquis, uncontrolled DM2, raynaud phenomenon, recent hospitalization at [facility] for altered mental status and [facility], admitted with generalized weakness and difficulty walking, found with COPD exacerbation, LE swelling, weakness, acute diastolic (congestive) heart failure.     Source: Patient [x]    Family [ ]     other [x]; Medical record    Pt reports good appetite and PO intake. Noted 50-75% and pt eating food from home as per flow sheets. Denies difficulty chewing/swallowing. Pt denies nausea, vomiting, diarrhea, or constipation, reports last BM yesterday (08/31). Pt denies having questions/concerns about diet and nutrition - made aware RD remains available.     Diet: Consistent Carbohydrate with snack + 800 ml fluid restriction     Enteral /Parenteral Nutrition: n/a    Weight as per flow sheets: (08/01) 199 pounds -> (08/11) 200.1 pounds -> (08/18) 206.3 pounds -> (09/01) 208.9 pounds -?accuracy of weight fluctuations likely due to fluid shifts as pt with edema, will continue to monitor.   % Weight Change: n/a    Pertinent Medications: MEDICATIONS  (STANDING):  acetaminophen  IVPB .. 1000 milliGRAM(s) IV Intermittent once  albuterol/ipratropium for Nebulization 3 milliLiter(s) Nebulizer every 6 hours  apixaban 5 milliGRAM(s) Oral every 12 hours  ascorbic acid 500 milliGRAM(s) Oral daily  aspirin enteric coated 81 milliGRAM(s) Oral daily  atorvastatin 80 milliGRAM(s) Oral at bedtime  azithromycin   Tablet 250 milliGRAM(s) Oral <User Schedule>  Biotene Dry Mouth Oral Rinse 5 milliLiter(s) Swish and Spit two times a day  bisacodyl Suppository 10 milliGRAM(s) Rectal daily  budesonide 160 MICROgram(s)/formoterol 4.5 MICROgram(s) Inhaler 2 Puff(s) Inhalation two times a day  buMETAnide Injectable 2 milliGRAM(s) IV Push two times a day  ceFAZolin   IVPB      ceFAZolin   IVPB 1000 milliGRAM(s) IV Intermittent every 8 hours  chlorhexidine 2% Cloths 1 Application(s) Topical daily  cholecalciferol 2000 Unit(s) Oral daily  dextrose 5%. 1000 milliLiter(s) (50 mL/Hr) IV Continuous <Continuous>  dextrose 50% Injectable 25 Gram(s) IV Push once  diltiazem    milliGRAM(s) Oral daily  ferrous    sulfate 325 milliGRAM(s) Oral two times a day  insulin glargine Injectable (LANTUS) 16 Unit(s) SubCutaneous at bedtime  insulin lispro (HumaLOG) corrective regimen sliding scale   SubCutaneous three times a day before meals  insulin lispro (HumaLOG) corrective regimen sliding scale   SubCutaneous at bedtime  insulin lispro Injectable (HumaLOG) 4 Unit(s) SubCutaneous three times a day with meals  lactulose Syrup 15 Gram(s) Oral two times a day  montelukast 10 milliGRAM(s) Oral daily  multivitamin 1 Tablet(s) Oral daily  mupirocin 2% Ointment 1 Application(s) Topical <User Schedule>  pantoprazole    Tablet 40 milliGRAM(s) Oral before breakfast  polyethylene glycol 3350 17 Gram(s) Oral daily  senna 2 Tablet(s) Oral at bedtime  theophylline ER (24 Hour) 400 milliGRAM(s) Oral daily  tiotropium 18 MICROgram(s) Capsule 1 Capsule(s) Inhalation daily    MEDICATIONS  (PRN):  glucagon  Injectable 1 milliGRAM(s) IntraMuscular once PRN Glucose LESS THAN 70 milligrams/deciliter  guaiFENesin   Syrup  (Sugar-Free) 100 milliGRAM(s) Oral every 6 hours PRN Cough  oxyCODONE    IR 5 milliGRAM(s) Oral every 6 hours PRN Severe Pain (7 - 10)  traMADol 25 milliGRAM(s) Oral every 6 hours PRN Mild Pain (1 - 3)    Pertinent Labs: (09/01) Basic metabolic panel WNL   Finger sticks: (09/01) 88 - 161 (08/31) 105 - 176   (08/09) HbA1c 7.5%     Skin: no noted pressure injuries as per documentation.   Noted +3 danya. leg edema as per flow sheets (previously +1 left hand and foot and +2 right hand and foot edema).      Estimated Needs:   [x] no change since previous assessment  [ ] recalculated:     Previous Nutrition Diagnosis: [x] Altered nutrition related lab values (HbA1c)     Nutrition Diagnosis is [x] ongoing - being addressed with Consistent Carbohydrate diet.     New Nutrition Diagnosis: [x] not applicable    Interventions:     1. Recommend continue Consistent Carbohydrate with snack diet + 800 ml fluid restriction (defer fluids to team). Will continue to monitor and adjust as needed.  2. Encourage PO intake and provide food preferences.   3. Provide nutrition therapy education as needed/requested - with no questions at this time.   4. Continue to obtain weights to identify changes if any.     Monitoring and Evaluation:     [x] PO intake [x] Tolerance to diet prescription [x] weights [x] follow up per protocol    RD remains available.  Tiffanie Tang MS RDN CDN #[phone].

## 2020-09-02 LAB
ANION GAP SERPL CALC-SCNC: 8 MMOL/L — SIGNIFICANT CHANGE UP (ref 5–17)
ANION GAP SERPL CALC-SCNC: 9 MMOL/L — SIGNIFICANT CHANGE UP (ref 5–17)
BUN SERPL-MCNC: 19 MG/DL — SIGNIFICANT CHANGE UP (ref 7–23)
BUN SERPL-MCNC: 20 MG/DL — SIGNIFICANT CHANGE UP (ref 7–23)
CALCIUM SERPL-MCNC: 10.2 MG/DL — SIGNIFICANT CHANGE UP (ref 8.4–10.5)
CALCIUM SERPL-MCNC: 10.4 MG/DL — SIGNIFICANT CHANGE UP (ref 8.4–10.5)
CHLORIDE SERPL-SCNC: 91 MMOL/L — LOW (ref 96–108)
CHLORIDE SERPL-SCNC: 93 MMOL/L — LOW (ref 96–108)
CO2 SERPL-SCNC: 39 MMOL/L — HIGH (ref 22–31)
CO2 SERPL-SCNC: 40 MMOL/L — HIGH (ref 22–31)
CREAT SERPL-MCNC: 0.93 MG/DL — SIGNIFICANT CHANGE UP (ref 0.5–1.3)
CREAT SERPL-MCNC: 1 MG/DL — SIGNIFICANT CHANGE UP (ref 0.5–1.3)
GLUCOSE BLDC GLUCOMTR-MCNC: 107 MG/DL — HIGH (ref 70–99)
GLUCOSE BLDC GLUCOMTR-MCNC: 128 MG/DL — HIGH (ref 70–99)
GLUCOSE BLDC GLUCOMTR-MCNC: 96 MG/DL — SIGNIFICANT CHANGE UP (ref 70–99)
GLUCOSE BLDC GLUCOMTR-MCNC: 99 MG/DL — SIGNIFICANT CHANGE UP (ref 70–99)
GLUCOSE SERPL-MCNC: 115 MG/DL — HIGH (ref 70–99)
GLUCOSE SERPL-MCNC: 85 MG/DL — SIGNIFICANT CHANGE UP (ref 70–99)
MAGNESIUM SERPL-MCNC: 2 MG/DL — SIGNIFICANT CHANGE UP (ref 1.6–2.6)
PHOSPHATE SERPL-MCNC: 4.3 MG/DL — SIGNIFICANT CHANGE UP (ref 2.5–4.5)
POTASSIUM SERPL-MCNC: 3.7 MMOL/L — SIGNIFICANT CHANGE UP (ref 3.5–5.3)
POTASSIUM SERPL-MCNC: 3.7 MMOL/L — SIGNIFICANT CHANGE UP (ref 3.5–5.3)
POTASSIUM SERPL-SCNC: 3.7 MMOL/L — SIGNIFICANT CHANGE UP (ref 3.5–5.3)
POTASSIUM SERPL-SCNC: 3.7 MMOL/L — SIGNIFICANT CHANGE UP (ref 3.5–5.3)
SODIUM SERPL-SCNC: 140 MMOL/L — SIGNIFICANT CHANGE UP (ref 135–145)
SODIUM SERPL-SCNC: 140 MMOL/L — SIGNIFICANT CHANGE UP (ref 135–145)

## 2020-09-02 PROCEDURE — 99232 SBSQ HOSP IP/OBS MODERATE 35: CPT

## 2020-09-02 PROCEDURE — 99233 SBSQ HOSP IP/OBS HIGH 50: CPT

## 2020-09-02 RX ORDER — ALPRAZOLAM 0.25 MG
0.25 TABLET ORAL ONCE
Refills: 0 | Status: DISCONTINUED | OUTPATIENT
Start: 2020-09-02 | End: 2020-09-02

## 2020-09-02 RX ORDER — BUMETANIDE 0.25 MG/ML
0.5 INJECTION INTRAMUSCULAR; INTRAVENOUS
Qty: 20 | Refills: 0 | Status: DISCONTINUED | OUTPATIENT
Start: 2020-09-02 | End: 2020-09-11

## 2020-09-02 RX ADMIN — SENNA PLUS 2 TABLET(S): 8.6 TABLET ORAL at 22:35

## 2020-09-02 RX ADMIN — AZITHROMYCIN 250 MILLIGRAM(S): 500 TABLET, FILM COATED ORAL at 18:32

## 2020-09-02 RX ADMIN — MUPIROCIN 1 APPLICATION(S): 20 OINTMENT TOPICAL at 10:08

## 2020-09-02 RX ADMIN — Medication 180 MILLIGRAM(S): at 06:29

## 2020-09-02 RX ADMIN — Medication 500 MILLIGRAM(S): at 13:17

## 2020-09-02 RX ADMIN — TIOTROPIUM BROMIDE 1 CAPSULE(S): 18 CAPSULE ORAL; RESPIRATORY (INHALATION) at 13:19

## 2020-09-02 RX ADMIN — Medication 1 TABLET(S): at 13:19

## 2020-09-02 RX ADMIN — PANTOPRAZOLE SODIUM 40 MILLIGRAM(S): 20 TABLET, DELAYED RELEASE ORAL at 06:29

## 2020-09-02 RX ADMIN — Medication 5 MILLILITER(S): at 18:32

## 2020-09-02 RX ADMIN — Medication 2000 UNIT(S): at 13:17

## 2020-09-02 RX ADMIN — Medication 4 UNIT(S): at 15:13

## 2020-09-02 RX ADMIN — Medication 5 MILLILITER(S): at 06:29

## 2020-09-02 RX ADMIN — BUDESONIDE AND FORMOTEROL FUMARATE DIHYDRATE 2 PUFF(S): 160; 4.5 AEROSOL RESPIRATORY (INHALATION) at 06:29

## 2020-09-02 RX ADMIN — MONTELUKAST 10 MILLIGRAM(S): 4 TABLET, CHEWABLE ORAL at 13:20

## 2020-09-02 RX ADMIN — Medication 3 MILLILITER(S): at 06:29

## 2020-09-02 RX ADMIN — BUMETANIDE 2.5 MG/HR: 0.25 INJECTION INTRAMUSCULAR; INTRAVENOUS at 13:12

## 2020-09-02 RX ADMIN — Medication 0.25 MILLIGRAM(S): at 06:37

## 2020-09-02 RX ADMIN — Medication 3 MILLILITER(S): at 18:33

## 2020-09-02 RX ADMIN — Medication 100 MILLIGRAM(S): at 22:34

## 2020-09-02 RX ADMIN — APIXABAN 5 MILLIGRAM(S): 2.5 TABLET, FILM COATED ORAL at 06:29

## 2020-09-02 RX ADMIN — APIXABAN 5 MILLIGRAM(S): 2.5 TABLET, FILM COATED ORAL at 18:32

## 2020-09-02 RX ADMIN — Medication 100 MILLIGRAM(S): at 13:21

## 2020-09-02 RX ADMIN — Medication 81 MILLIGRAM(S): at 13:19

## 2020-09-02 RX ADMIN — BUDESONIDE AND FORMOTEROL FUMARATE DIHYDRATE 2 PUFF(S): 160; 4.5 AEROSOL RESPIRATORY (INHALATION) at 18:32

## 2020-09-02 RX ADMIN — ATORVASTATIN CALCIUM 80 MILLIGRAM(S): 80 TABLET, FILM COATED ORAL at 22:34

## 2020-09-02 RX ADMIN — Medication 100 MILLIGRAM(S): at 06:29

## 2020-09-02 RX ADMIN — Medication 4 UNIT(S): at 10:04

## 2020-09-02 RX ADMIN — Medication 4 UNIT(S): at 18:58

## 2020-09-02 RX ADMIN — OXYCODONE HYDROCHLORIDE 5 MILLIGRAM(S): 5 TABLET ORAL at 19:03

## 2020-09-02 RX ADMIN — INSULIN GLARGINE 16 UNIT(S): 100 INJECTION, SOLUTION SUBCUTANEOUS at 22:34

## 2020-09-02 RX ADMIN — Medication 325 MILLIGRAM(S): at 18:32

## 2020-09-02 RX ADMIN — BUMETANIDE 2 MILLIGRAM(S): 0.25 INJECTION INTRAMUSCULAR; INTRAVENOUS at 06:57

## 2020-09-02 RX ADMIN — OXYCODONE HYDROCHLORIDE 5 MILLIGRAM(S): 5 TABLET ORAL at 18:33

## 2020-09-02 RX ADMIN — Medication 400 MILLIGRAM(S): at 13:19

## 2020-09-02 RX ADMIN — CHLORHEXIDINE GLUCONATE 1 APPLICATION(S): 213 SOLUTION TOPICAL at 13:26

## 2020-09-02 RX ADMIN — Medication 3 MILLILITER(S): at 13:16

## 2020-09-02 RX ADMIN — Medication 325 MILLIGRAM(S): at 06:29

## 2020-09-02 NOTE — PROGRESS NOTE ADULT - SUBJECTIVE AND OBJECTIVE BOX
CARDIOLOGY FOLLOW UP - Dr. Brunson    CC no new complaints   still with dyspnea , LE edema     PHYSICAL EXAM:  T(C): 36.5 (09-02-20 @ 09:13), Max: 36.8 (09-01-20 @ 17:20)  HR: 86 (09-02-20 @ 10:12) (75 - 101)  BP: 147/71 (09-02-20 @ 09:13) (133/82 - 163/80)  RR: 18 (09-02-20 @ 09:13) (18 - 18)  SpO2: 100% (09-02-20 @ 10:12) (96% - 100%)  Wt(kg): --  I&O's Summary    01 Sep 2020 07:01  -  02 Sep 2020 07:00  --------------------------------------------------------  IN: 200 mL / OUT: 1400 mL / NET: -1200 mL    02 Sep 2020 07:01  -  02 Sep 2020 10:27  --------------------------------------------------------  IN: 0 mL / OUT: 1000 mL / NET: -1000 mL        Appearance: Normal	  Cardiovascular: Normal S1 S2,RRR, No JVD, No murmurs  Respiratory: diminished   Gastrointestinal:  Soft, Non-tender, + BS	  Extremities: Normal range of motion, No clubbing, ++ b/l l edema , +blisters        MEDICATIONS  (STANDING):  acetaminophen  IVPB .. 1000 milliGRAM(s) IV Intermittent once  albuterol/ipratropium for Nebulization 3 milliLiter(s) Nebulizer every 6 hours  apixaban 5 milliGRAM(s) Oral every 12 hours  ascorbic acid 500 milliGRAM(s) Oral daily  aspirin enteric coated 81 milliGRAM(s) Oral daily  atorvastatin 80 milliGRAM(s) Oral at bedtime  azithromycin   Tablet 250 milliGRAM(s) Oral <User Schedule>  Biotene Dry Mouth Oral Rinse 5 milliLiter(s) Swish and Spit two times a day  bisacodyl Suppository 10 milliGRAM(s) Rectal daily  budesonide 160 MICROgram(s)/formoterol 4.5 MICROgram(s) Inhaler 2 Puff(s) Inhalation two times a day  buMETAnide Injectable 2 milliGRAM(s) IV Push two times a day  ceFAZolin   IVPB      ceFAZolin   IVPB 1000 milliGRAM(s) IV Intermittent every 8 hours  chlorhexidine 2% Cloths 1 Application(s) Topical daily  cholecalciferol 2000 Unit(s) Oral daily  dextrose 5%. 1000 milliLiter(s) (50 mL/Hr) IV Continuous <Continuous>  dextrose 50% Injectable 25 Gram(s) IV Push once  diltiazem    milliGRAM(s) Oral daily  ferrous    sulfate 325 milliGRAM(s) Oral two times a day  insulin glargine Injectable (LANTUS) 16 Unit(s) SubCutaneous at bedtime  insulin lispro (HumaLOG) corrective regimen sliding scale   SubCutaneous three times a day before meals  insulin lispro (HumaLOG) corrective regimen sliding scale   SubCutaneous at bedtime  insulin lispro Injectable (HumaLOG) 4 Unit(s) SubCutaneous three times a day with meals  lactulose Syrup 15 Gram(s) Oral two times a day  montelukast 10 milliGRAM(s) Oral daily  multivitamin 1 Tablet(s) Oral daily  mupirocin 2% Ointment 1 Application(s) Topical <User Schedule>  pantoprazole    Tablet 40 milliGRAM(s) Oral before breakfast  polyethylene glycol 3350 17 Gram(s) Oral daily  senna 2 Tablet(s) Oral at bedtime  theophylline ER (24 Hour) 400 milliGRAM(s) Oral daily  tiotropium 18 MICROgram(s) Capsule 1 Capsule(s) Inhalation daily      TELEMETRY: 	    ECG:  	  RADIOLOGY:   DIAGNOSTIC TESTING:  [ ] Echocardiogram:  [ ]  Catheterization:  [ ] Stress Test:    OTHER: 	    LABS:	 	                                8.2    7.30  )-----------( 197      ( 01 Sep 2020 09:15 )             28.8     09-02    140  |  91<L>  |  20  ----------------------------<  115<H>  3.7   |  40<H>  |  0.93    Ca    10.2      02 Sep 2020 07:01

## 2020-09-02 NOTE — PROGRESS NOTE ADULT - ATTENDING COMMENTS
Justine Colon  Pager: [phone]. If no response or past 5 pm call [phone].    Will sign off, please call with questions.

## 2020-09-02 NOTE — PROGRESS NOTE ADULT - ATTENDING COMMENTS
Discussed with patient's brother on the phone today. Family would like patient to [facility] lung transplant. She had apparently started the process of being evaluated there when she became sick and was admitted to the hospital here. Per the patient's brother, her pulm and cards doctors outpatient are trying to further this plan. If the family would like to pursue more treatment, another option would be to consider hospital transfer to [facility]. Family is speaking with outpatient doctors and seeing if this can also be arranged.

## 2020-09-02 NOTE — PROGRESS NOTE ADULT - PROBLEM SELECTOR PLAN 5
-w erythema. C/w cefazolin for possible cellulitis, day 6/7 today   -Pt feels b/l foot swelling is getting worse, podiatry re-evaluated 8/31  -continue with daily mupirocin with adaptic and dsd to left forefoot daily, ACE compression b/l if pt tolerates

## 2020-09-02 NOTE — PROGRESS NOTE ADULT - ASSESSMENT
60F w/ end stage COPD on 3LNC (pending transplant list at [facility]), former smoker, CAD s/p stent (2016), afib on eliquis, uncontrolled DM2, raynaud phenomenon and recent hospitalization at [facility] for altered mental status and [facility] presents to [facility] for evaluation of generalized weakness and difficulty walking.    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   No fever or leucocytosis   Pt with recent administration of steroids, some immunosuppression.   Possible LLE cellulitis  b/l foot ulcers.       Plan:   c/w cefazolin 1 gm iv q8h for now  can switch to po keflex 500 mg q8h when ready for discharge until 9/4/20  day 5/7 of therapy today,  erythema more consistent with venous stasis dermatitis rather than infection at this time.   abscess cx NTD   leg elevation   compression stockings or ace wrap if tolerated   wound care for b/l foot ulcers

## 2020-09-02 NOTE — PROGRESS NOTE ADULT - SUBJECTIVE AND OBJECTIVE BOX
Patient is a 60y old  Female who presents with a chief complaint of 60F p/w generalized weakness and difficulty walking (02 Sep 2020 10:27)    SUBJECTIVE / OVERNIGHT EVENTS: patient still dyspneic with LE edema.     MEDICATIONS  (STANDING):  acetaminophen  IVPB .. 1000 milliGRAM(s) IV Intermittent once  albuterol/ipratropium for Nebulization 3 milliLiter(s) Nebulizer every 6 hours  apixaban 5 milliGRAM(s) Oral every 12 hours  ascorbic acid 500 milliGRAM(s) Oral daily  aspirin enteric coated 81 milliGRAM(s) Oral daily  atorvastatin 80 milliGRAM(s) Oral at bedtime  azithromycin   Tablet 250 milliGRAM(s) Oral <User Schedule>  Biotene Dry Mouth Oral Rinse 5 milliLiter(s) Swish and Spit two times a day  bisacodyl Suppository 10 milliGRAM(s) Rectal daily  budesonide 160 MICROgram(s)/formoterol 4.5 MICROgram(s) Inhaler 2 Puff(s) Inhalation two times a day  buMETAnide Injectable 2 milliGRAM(s) IV Push two times a day  ceFAZolin   IVPB      ceFAZolin   IVPB 1000 milliGRAM(s) IV Intermittent every 8 hours  chlorhexidine 2% Cloths 1 Application(s) Topical daily  cholecalciferol 2000 Unit(s) Oral daily  dextrose 5%. 1000 milliLiter(s) (50 mL/Hr) IV Continuous <Continuous>  dextrose 50% Injectable 25 Gram(s) IV Push once  diltiazem    milliGRAM(s) Oral daily  ferrous    sulfate 325 milliGRAM(s) Oral two times a day  insulin glargine Injectable (LANTUS) 16 Unit(s) SubCutaneous at bedtime  insulin lispro (HumaLOG) corrective regimen sliding scale   SubCutaneous three times a day before meals  insulin lispro (HumaLOG) corrective regimen sliding scale   SubCutaneous at bedtime  insulin lispro Injectable (HumaLOG) 4 Unit(s) SubCutaneous three times a day with meals  lactulose Syrup 15 Gram(s) Oral two times a day  montelukast 10 milliGRAM(s) Oral daily  multivitamin 1 Tablet(s) Oral daily  mupirocin 2% Ointment 1 Application(s) Topical <User Schedule>  pantoprazole    Tablet 40 milliGRAM(s) Oral before breakfast  polyethylene glycol 3350 17 Gram(s) Oral daily  senna 2 Tablet(s) Oral at bedtime  theophylline ER (24 Hour) 400 milliGRAM(s) Oral daily  tiotropium 18 MICROgram(s) Capsule 1 Capsule(s) Inhalation daily    MEDICATIONS  (PRN):  glucagon  Injectable 1 milliGRAM(s) IntraMuscular once PRN Glucose LESS THAN 70 milligrams/deciliter  guaiFENesin   Syrup  (Sugar-Free) 100 milliGRAM(s) Oral every 6 hours PRN Cough  oxyCODONE    IR 5 milliGRAM(s) Oral every 6 hours PRN Severe Pain (7 - 10)  traMADol 25 milliGRAM(s) Oral every 6 hours PRN Mild Pain (1 - 3)      Vital Signs Last 24 Hrs  T(C): 36.5 (02 Sep 2020 09:13), Max: 36.8 (01 Sep 2020 17:20)  T(F): 97.7 (02 Sep 2020 09:13), Max: 98.3 (01 Sep 2020 17:20)  HR: 86 (02 Sep 2020 10:12) (75 - 101)  BP: 147/71 (02 Sep 2020 09:13) (133/82 - 163/80)  BP(mean): --  RR: 18 (02 Sep 2020 09:13) (18 - 18)  SpO2: 100% (02 Sep 2020 10:12) (96% - 100%)  CAPILLARY BLOOD GLUCOSE      POCT Blood Glucose.: 128 mg/dL (02 Sep 2020 09:59)  POCT Blood Glucose.: 90 mg/dL (01 Sep 2020 21:41)  POCT Blood Glucose.: 118 mg/dL (01 Sep 2020 19:24)  POCT Blood Glucose.: 88 mg/dL (01 Sep 2020 14:53)    I&O's Summary    01 Sep 2020 07:01  -  02 Sep 2020 07:00  --------------------------------------------------------  IN: 200 mL / OUT: 1400 mL / NET: -1200 mL    02 Sep 2020 07:01  -  02 Sep 2020 11:06  --------------------------------------------------------  IN: 0 mL / OUT: 1000 mL / NET: -1000 mL      PHYSICAL EXAM:  GENERAL: NAD  EYES: conjunctiva and sclera clear  CHEST/LUNG: diminished lung sounds, no wheezing   HEART: +S1/S2, reg   ABDOMEN: Soft, Nontender, Nondistended  EXTREMITIES: +B/L LE edema up to thighs   PSYCH: AAOx3    LABS:                        8.2    7.30  )-----------( 197      ( 01 Sep 2020 09:15 )             28.8     09-02    140  |  91<L>  |  20  ----------------------------<  115<H>  3.7   |  40<H>  |  0.93    Ca    10.2      02 Sep 2020 07:01            Care Discussed with Consultants/Other Providers: Dr. Brunson regarding diuresis

## 2020-09-02 NOTE — CHART NOTE - NSCHARTNOTEFT_GEN_A_CORE
Spoke with Samantha at [phone] from primary team.  Spoke with pt and pt does not want to speak to Palliative care.  Consult cancelled by Cabrera.  Will remove from list.  Advised to reconsult if necessary.

## 2020-09-02 NOTE — PROGRESS NOTE ADULT - PROBLEM SELECTOR PLAN 1
-Dyspnea multifactorial in the setting of underlying lung disease, cardiovascular dysfunction, obesity, and deconditioning.   -Was given prolonged course of Prednisone, now tapered off. Overnight and prn Bipap. Pt comfortable off Bipap and able to speak in full sentences.   - cont azithro, duonebs, spiriva, symbicort, singulair  -Continue supplemental O2 with goal O2 sat > 88% - she does not need to be at 100%.  -no further intervention from pulmonary

## 2020-09-02 NOTE — PROGRESS NOTE ADULT - PROBLEM SELECTOR PLAN 2
-pt not responding well to diuresis, still has b/l LE edema   -as per discussion with Dr. Brunson, will put on bumex gtt at 0.5mg/hr  -monitor BMP, Mg, Phos Q12 hrs and replete as needed   -Cr stable but CO2 uptrending, to start diamox later today if it continues to go up   -strict ins and outs

## 2020-09-02 NOTE — PROGRESS NOTE ADULT - ASSESSMENT
EVAN 1/7/19: no ALINA thrombus, unable to assess LV sys fx  echo 12/7/18: EF 71%, nl LV sys fx, mild diastolic dysfx     a/p    60 year old female with hx of D CHF, HTN, CAD s/p PCI, Afib/ aflutter, s/p Aflutter Ablation 12/2019, COPD, DM2, Anxiety, , raynaud phenomenon presenting with weakness, SOB , hyperkalemia.      1. Dyspnea, Resp failure  -secondary to chronic lung disease, COPD, volume overload  -pulm f/u   -covid 19 negative  -no acs , cv stable   -ecg with known RBBB, new twi noted, hyperkalemia also noted on admission  -echo with normal LVEF, mild diastolic dysfunction   -s/p PO Prednisone  -on Duoneb, Symbicort, Spiriva and Theophylline.    2. CAD s/p PCI   -stable, no cp  -c/w ASA, statin  -echo noted above     3. Afib/aflutter s/p  Aflutter Ablation 12/2019  -in NSR, cw cardizem  mg daily  -CHADsVac=2, c/w eliquis     4. Acute on Chronic Diastolic CHF   -dyspnea mostly secondary to copd  -intermittently on bipap   -echo with normal lVEF   -cont IV bumex  -closely monitor bicarb, Cr stable, CO2 stable     dvt ppx

## 2020-09-02 NOTE — PROGRESS NOTE ADULT - SUBJECTIVE AND OBJECTIVE BOX
60y old  Female who presents with a chief complaint of 60F p/w generalized weakness and difficulty walking (02 Sep 2020 10:27)      Interval history:  Afebrile, continues to require BiPAP, leg swelling persists.       Allergies:   albuterol (Unknown)  No Known Allergies      Antimicrobials:  azithromycin   Tablet 250 milliGRAM(s) Oral <User Schedule>      ceFAZolin   IVPB 1000 milliGRAM(s) IV Intermittent every 8 hours      REVIEW OF SYSTEMS:  No chest pain   No N/V  No dysuria   No rash.       Vital Signs Last 24 Hrs  T(C): 36.5 (09-02-20 @ 09:13), Max: 36.8 (09-01-20 @ 17:20)  T(F): 97.7 (09-02-20 @ 09:13), Max: 98.3 (09-01-20 @ 17:20)  HR: 86 (09-02-20 @ 10:12) (75 - 101)  BP: 147/71 (09-02-20 @ 09:13) (133/82 - 163/80)  BP(mean): --  RR: 18 (09-02-20 @ 09:13) (18 - 18)  SpO2: 100% (09-02-20 @ 10:12) (96% - 100%)      PHYSICAL EXAM:  Patient in no acute distress. AAOX3.  No icterus, no oral ulcers.   Cardiovascular: S1S2 normal.  Lungs: decreased air entry B/L lung fields.  Gastrointestinal: soft, nontender, nondistended.  Extremities: ++ edema b/l lower extremities. B/L Foot swelling with blistering, erythema of the foot around the scab, erythema of the LLE, but no warmth.   IV sites not inflamed.                           8.2    7.30  )-----------( 197      ( 01 Sep 2020 09:15 )             28.8   09-02    140  |  91<L>  |  20  ----------------------------<  115<H>  3.7   |  40<H>  |  0.93    Ca    10.2      02 Sep 2020 07:01      Culture - Abscess with Gram Stain (collected 31 Aug 2020 22:06)  Source: .Abscess foot  Preliminary Report (01 Sep 2020 22:05):    No growth

## 2020-09-03 LAB
ANION GAP SERPL CALC-SCNC: 8 MMOL/L — SIGNIFICANT CHANGE UP (ref 5–17)
BUN SERPL-MCNC: 18 MG/DL — SIGNIFICANT CHANGE UP (ref 7–23)
CALCIUM SERPL-MCNC: 9.9 MG/DL — SIGNIFICANT CHANGE UP (ref 8.4–10.5)
CHLORIDE SERPL-SCNC: 90 MMOL/L — LOW (ref 96–108)
CO2 SERPL-SCNC: 42 MMOL/L — HIGH (ref 22–31)
CREAT SERPL-MCNC: 1.09 MG/DL — SIGNIFICANT CHANGE UP (ref 0.5–1.3)
GLUCOSE BLDC GLUCOMTR-MCNC: 104 MG/DL — HIGH (ref 70–99)
GLUCOSE BLDC GLUCOMTR-MCNC: 109 MG/DL — HIGH (ref 70–99)
GLUCOSE BLDC GLUCOMTR-MCNC: 114 MG/DL — HIGH (ref 70–99)
GLUCOSE BLDC GLUCOMTR-MCNC: 73 MG/DL — SIGNIFICANT CHANGE UP (ref 70–99)
GLUCOSE SERPL-MCNC: 76 MG/DL — SIGNIFICANT CHANGE UP (ref 70–99)
HCT VFR BLD CALC: 29 % — LOW (ref 34.5–45)
HGB BLD-MCNC: 8.3 G/DL — LOW (ref 11.5–15.5)
MAGNESIUM SERPL-MCNC: 1.9 MG/DL — SIGNIFICANT CHANGE UP (ref 1.6–2.6)
MCHC RBC-ENTMCNC: 24.6 PG — LOW (ref 27–34)
MCHC RBC-ENTMCNC: 28.6 GM/DL — LOW (ref 32–36)
MCV RBC AUTO: 86.1 FL — SIGNIFICANT CHANGE UP (ref 80–100)
NRBC # BLD: 0 /100 WBCS — SIGNIFICANT CHANGE UP (ref 0–0)
PHOSPHATE SERPL-MCNC: 4.5 MG/DL — SIGNIFICANT CHANGE UP (ref 2.5–4.5)
PLATELET # BLD AUTO: 300 K/UL — SIGNIFICANT CHANGE UP (ref 150–400)
POTASSIUM SERPL-MCNC: 3.9 MMOL/L — SIGNIFICANT CHANGE UP (ref 3.5–5.3)
POTASSIUM SERPL-SCNC: 3.9 MMOL/L — SIGNIFICANT CHANGE UP (ref 3.5–5.3)
RBC # BLD: 3.37 M/UL — LOW (ref 3.8–5.2)
RBC # FLD: 18.2 % — HIGH (ref 10.3–14.5)
SODIUM SERPL-SCNC: 140 MMOL/L — SIGNIFICANT CHANGE UP (ref 135–145)
WBC # BLD: 6.75 K/UL — SIGNIFICANT CHANGE UP (ref 3.8–10.5)
WBC # FLD AUTO: 6.75 K/UL — SIGNIFICANT CHANGE UP (ref 3.8–10.5)

## 2020-09-03 PROCEDURE — 99232 SBSQ HOSP IP/OBS MODERATE 35: CPT

## 2020-09-03 PROCEDURE — 99233 SBSQ HOSP IP/OBS HIGH 50: CPT

## 2020-09-03 RX ORDER — OXYCODONE HYDROCHLORIDE 5 MG/1
5 TABLET ORAL EVERY 6 HOURS
Refills: 0 | Status: DISCONTINUED | OUTPATIENT
Start: 2020-09-03 | End: 2020-09-10

## 2020-09-03 RX ORDER — ALPRAZOLAM 0.25 MG
0.25 TABLET ORAL ONCE
Refills: 0 | Status: DISCONTINUED | OUTPATIENT
Start: 2020-09-03 | End: 2020-09-03

## 2020-09-03 RX ADMIN — Medication 4 UNIT(S): at 18:58

## 2020-09-03 RX ADMIN — INSULIN GLARGINE 16 UNIT(S): 100 INJECTION, SOLUTION SUBCUTANEOUS at 22:13

## 2020-09-03 RX ADMIN — APIXABAN 5 MILLIGRAM(S): 2.5 TABLET, FILM COATED ORAL at 05:51

## 2020-09-03 RX ADMIN — Medication 325 MILLIGRAM(S): at 17:26

## 2020-09-03 RX ADMIN — Medication 4 UNIT(S): at 09:40

## 2020-09-03 RX ADMIN — BUDESONIDE AND FORMOTEROL FUMARATE DIHYDRATE 2 PUFF(S): 160; 4.5 AEROSOL RESPIRATORY (INHALATION) at 05:50

## 2020-09-03 RX ADMIN — ATORVASTATIN CALCIUM 80 MILLIGRAM(S): 80 TABLET, FILM COATED ORAL at 22:12

## 2020-09-03 RX ADMIN — OXYCODONE HYDROCHLORIDE 5 MILLIGRAM(S): 5 TABLET ORAL at 23:00

## 2020-09-03 RX ADMIN — OXYCODONE HYDROCHLORIDE 5 MILLIGRAM(S): 5 TABLET ORAL at 22:17

## 2020-09-03 RX ADMIN — Medication 500 MILLIGRAM(S): at 12:34

## 2020-09-03 RX ADMIN — Medication 3 MILLILITER(S): at 05:50

## 2020-09-03 RX ADMIN — BUMETANIDE 2.5 MG/HR: 0.25 INJECTION INTRAMUSCULAR; INTRAVENOUS at 17:28

## 2020-09-03 RX ADMIN — Medication 100 MILLIGRAM(S): at 05:51

## 2020-09-03 RX ADMIN — Medication 100 MILLIGRAM(S): at 15:06

## 2020-09-03 RX ADMIN — OXYCODONE HYDROCHLORIDE 5 MILLIGRAM(S): 5 TABLET ORAL at 06:08

## 2020-09-03 RX ADMIN — CHLORHEXIDINE GLUCONATE 1 APPLICATION(S): 213 SOLUTION TOPICAL at 12:35

## 2020-09-03 RX ADMIN — Medication 2000 UNIT(S): at 12:35

## 2020-09-03 RX ADMIN — Medication 180 MILLIGRAM(S): at 05:50

## 2020-09-03 RX ADMIN — MONTELUKAST 10 MILLIGRAM(S): 4 TABLET, CHEWABLE ORAL at 12:34

## 2020-09-03 RX ADMIN — Medication 1 TABLET(S): at 12:34

## 2020-09-03 RX ADMIN — Medication 5 MILLILITER(S): at 22:13

## 2020-09-03 RX ADMIN — Medication 325 MILLIGRAM(S): at 05:50

## 2020-09-03 RX ADMIN — Medication 400 MILLIGRAM(S): at 12:34

## 2020-09-03 RX ADMIN — OXYCODONE HYDROCHLORIDE 5 MILLIGRAM(S): 5 TABLET ORAL at 06:38

## 2020-09-03 RX ADMIN — APIXABAN 5 MILLIGRAM(S): 2.5 TABLET, FILM COATED ORAL at 17:27

## 2020-09-03 RX ADMIN — Medication 3 MILLILITER(S): at 12:34

## 2020-09-03 RX ADMIN — Medication 3 MILLILITER(S): at 22:18

## 2020-09-03 RX ADMIN — Medication 81 MILLIGRAM(S): at 12:34

## 2020-09-03 RX ADMIN — SENNA PLUS 2 TABLET(S): 8.6 TABLET ORAL at 22:11

## 2020-09-03 RX ADMIN — Medication 4 UNIT(S): at 15:11

## 2020-09-03 RX ADMIN — BUDESONIDE AND FORMOTEROL FUMARATE DIHYDRATE 2 PUFF(S): 160; 4.5 AEROSOL RESPIRATORY (INHALATION) at 18:53

## 2020-09-03 RX ADMIN — PANTOPRAZOLE SODIUM 40 MILLIGRAM(S): 20 TABLET, DELAYED RELEASE ORAL at 05:50

## 2020-09-03 RX ADMIN — MUPIROCIN 1 APPLICATION(S): 20 OINTMENT TOPICAL at 18:21

## 2020-09-03 RX ADMIN — Medication 5 MILLILITER(S): at 05:51

## 2020-09-03 RX ADMIN — Medication 100 MILLIGRAM(S): at 22:12

## 2020-09-03 RX ADMIN — Medication 3 MILLILITER(S): at 17:27

## 2020-09-03 RX ADMIN — Medication 0.25 MILLIGRAM(S): at 17:27

## 2020-09-03 RX ADMIN — TIOTROPIUM BROMIDE 1 CAPSULE(S): 18 CAPSULE ORAL; RESPIRATORY (INHALATION) at 12:34

## 2020-09-03 NOTE — PROGRESS NOTE ADULT - PROBLEM SELECTOR PLAN 2
-c/w bumex gtt at 0.5mg/hr, urine output overnight was 3850 ml  -monitor BMP, Mg, Phos Q12 hrs and replete as needed   -Cr stable but CO2 uptrending  -will check ABG today   -strict ins and outs

## 2020-09-03 NOTE — CONSULT NOTE ADULT - ASSESSMENT
60F w/ end stage COPD on 3LNC (pending transplant list at [facility]), former smoker, CAD s/p stent (2016), afib on eliquis, uncontrolled DM2, raynaud phenomenon and recent hospitalization at [facility] for altered mental status and [facility] presents to [facility] for evaluation of generalized weakness and difficulty walking admit to medicine for further evaluation.    Copd exacerbation: with hypoxic and hypercarbic resp failure  CAD: s/p stent  a Fibrillation  uncontrolled dm   Raynaud's PHENOMENON  [place]    She is on appropriate rx of copd at this time including theophylline  would check its levels :  Cont BD:   off steroids at this time:  No pulm htn pn ECHO:  She is already on full dose AC for a fibrillation:  She is on bipap at 15 /5: abg NOTED:  hco3 is creeping  up: need to be watched may need diamox: ph is 7.40  can she be transferred to [facility]: she did go there before and was supposed to finish few tests prior to that   She is on IV Bumex:   dw brother and team

## 2020-09-03 NOTE — PROGRESS NOTE ADULT - ASSESSMENT
EVAN 1/7/19: no ALINA thrombus, unable to assess LV sys fx  echo 12/7/18: EF 71%, nl LV sys fx, mild diastolic dysfx     a/p    60 year old female with hx of D CHF, HTN, CAD s/p PCI, Afib/ aflutter, s/p Aflutter Ablation 12/2019, COPD, DM2, Anxiety, , raynaud phenomenon presenting with weakness, SOB , hyperkalemia.      1. Dyspnea, Resp failure  -secondary to chronic lung disease, COPD, volume overload  -pulm f/u   -covid 19 negative  -no acs , cv stable   -ecg with known RBBB, new twi noted, hyperkalemia also noted on admission  -echo with normal LVEF, mild diastolic dysfunction   -s/p PO Prednisone, bumex gtt   -on Duoneb, Symbicort, Spiriva and Theophylline.    2. CAD s/p PCI   -stable, no cp  -c/w ASA, statin  -echo noted above     3. Afib/aflutter s/p  Aflutter Ablation 12/2019  -in NSR, cw cardizem  mg daily  -CHADsVac=2, c/w eliquis     4. Acute on Chronic Diastolic CHF   -dyspnea mostly secondary to copd  -intermittently on bipap   -echo with normal lVEF   -cont bumex gtt  -closely monitor bicarb, Cr stable, CO2 stable     dvt ppx

## 2020-09-03 NOTE — PROGRESS NOTE ADULT - SUBJECTIVE AND OBJECTIVE BOX
Patient is a 60y old  Female who presents with a chief complaint of 60F p/w generalized weakness and difficulty walking (24 Aug 2020 11:41)    Patient with persistent dyspnea and low tolerance of therapies.  Requiring BIPAP except when eating.  Frustrated with edema.    MEDICATIONS  (STANDING):  acetaminophen  IVPB .. 1000 milliGRAM(s) IV Intermittent once  albuterol/ipratropium for Nebulization 3 milliLiter(s) Nebulizer every 6 hours  apixaban 5 milliGRAM(s) Oral every 12 hours  ascorbic acid 500 milliGRAM(s) Oral daily  aspirin enteric coated 81 milliGRAM(s) Oral daily  atorvastatin 80 milliGRAM(s) Oral at bedtime  azithromycin   Tablet 250 milliGRAM(s) Oral <User Schedule>  Biotene Dry Mouth Oral Rinse 5 milliLiter(s) Swish and Spit two times a day  bisacodyl Suppository 10 milliGRAM(s) Rectal daily  budesonide 160 MICROgram(s)/formoterol 4.5 MICROgram(s) Inhaler 2 Puff(s) Inhalation two times a day  buMETAnide Infusion 0.5 mG/Hr (2.5 mL/Hr) IV Continuous <Continuous>  ceFAZolin   IVPB      ceFAZolin   IVPB 1000 milliGRAM(s) IV Intermittent every 8 hours  chlorhexidine 2% Cloths 1 Application(s) Topical daily  cholecalciferol 2000 Unit(s) Oral daily  dextrose 5%. 1000 milliLiter(s) (50 mL/Hr) IV Continuous <Continuous>  dextrose 50% Injectable 25 Gram(s) IV Push once  diltiazem    milliGRAM(s) Oral daily  ferrous    sulfate 325 milliGRAM(s) Oral two times a day  insulin glargine Injectable (LANTUS) 16 Unit(s) SubCutaneous at bedtime  insulin lispro (HumaLOG) corrective regimen sliding scale   SubCutaneous three times a day before meals  insulin lispro (HumaLOG) corrective regimen sliding scale   SubCutaneous at bedtime  insulin lispro Injectable (HumaLOG) 4 Unit(s) SubCutaneous three times a day with meals  lactulose Syrup 15 Gram(s) Oral two times a day  montelukast 10 milliGRAM(s) Oral daily  multivitamin 1 Tablet(s) Oral daily  mupirocin 2% Ointment 1 Application(s) Topical <User Schedule>  pantoprazole    Tablet 40 milliGRAM(s) Oral before breakfast  polyethylene glycol 3350 17 Gram(s) Oral daily  senna 2 Tablet(s) Oral at bedtime  theophylline ER (24 Hour) 400 milliGRAM(s) Oral daily  tiotropium 18 MICROgram(s) Capsule 1 Capsule(s) Inhalation daily    MEDICATIONS  (PRN):  glucagon  Injectable 1 milliGRAM(s) IntraMuscular once PRN Glucose LESS THAN 70 milligrams/deciliter  guaiFENesin   Syrup  (Sugar-Free) 100 milliGRAM(s) Oral every 6 hours PRN Cough  oxyCODONE    IR 5 milliGRAM(s) Oral every 6 hours PRN Severe Pain (7 - 10)    Vital Signs Last 24 Hrs  T(C): 36.8 (03 Sep 2020 05:13), Max: 36.8 (02 Sep 2020 15:59)  T(F): 98.3 (03 Sep 2020 05:13), Max: 98.3 (02 Sep 2020 15:59)  HR: 86 (03 Sep 2020 05:23) (78 - 98)  BP: 153/71 (03 Sep 2020 05:13) (137/85 - 160/75)  BP(mean): --  RR: 20 (03 Sep 2020 05:13) (18 - 20)  SpO2: 98% (03 Sep 2020 05:23) (96% - 100%)    PHYSICAL EXAM  Constitutional - On O2, irritable.  HEENT - NCAT, EOMI  Neck - Supple, No limited ROM  Chest - Decreased breath sounds bibasilar.  Cardiovascular - RRR, S1S2, No murmurs  Abdomen - BS+, Soft, NTND  Extremities - + edema, LE wraps in place  Neurologic Exam -                 AAO x 3  Motor- 3/5 bl LEs, 4/5 bl UEs       Psychiatric - Anxious, irritable during exam.                           8.3    6.75  )-----------( 300      ( 03 Sep 2020 08:49 )             29.0     09-03    140  |  90<L>  |  18  ----------------------------<  76  3.9   |  42<H>  |  1.09    Ca    9.9      03 Sep 2020 08:49  Phos  4.5     09-03  Mg     1.9     09-03      Impression:  59 yo with functional deficits secondary to diagnosis of myopathy    Plan:  PT- ROM, Bed Mob, Transfers, Amb w AD and bracing as needed  OT- ADLs, bracing  Prec- Falls, Cardiac, Pulm  DVT Prophylaxis- On Apixaban  Skin- Turn q2 h  Dispo- Patient with poor tolerance and requiring BIPAP- likely will not tolerate acute rehab- recommend [facility].

## 2020-09-03 NOTE — CONSULT NOTE ADULT - REASON FOR ADMISSION
60F p/w generalized weakness and difficulty walking

## 2020-09-03 NOTE — PROGRESS NOTE ADULT - ASSESSMENT
60F w COPD on 3LNC (?pending transplant list at [facility], [place]), former smoker, CAD s/p stent (2016), afib on eliquis, uncontrolled DM2, raynaud phenomenon and recent hospitalization at [facility] for altered mental status and AUR[facility]A aw COPD exacerbation, LE swelling, weakness.     Prolonged hospitalization.

## 2020-09-03 NOTE — PHYSICAL THERAPY INITIAL EVALUATION ADULT - PREDICTED DURATION OF THERAPY (DAYS/WKS), PT EVAL
Livier Carver, 78 y.o.,  female    SUBJECTIVE  Ff-up    Weight loss- EGD 8/2020 showing chronic gastritis,and schatzi's ring dilated. She was started on protonix by dr. Yoandy Cabrera. Says her appetite has somehat improved    Numbness of legs/feet- lumbar Xray showing multilevel arthritis. Other labs wnl including SPEP, vitamin b12. She is interested in trying medication for this. She has upcoming appt with Dr. Krista Justin 9/8    HTN/CKD 3-  She continues to take clonidine, hydralazine and norvasc. Previous JOHNNY is resolving. She is following dr. Michelle Lee who recommended to continue to be off lisinopril add potassium andl monitoring proteinuria. She is on prn lasix few times a week for leg edema. She has h/o AV block and advised against BB. DM w/ CKD 3-  diet controlled, She Reports [facility]  1teens. HL- on zocor    Aortic regurtiation /bradycardia- evaluated by cardiology Dr. Wellington Whiteside 2019,  She is asymptomatic, likely due to calcific aortic valve and recommend recheck in about 2-3 years. Gout- usually foot swelling and pain, related to seafood. Says taking colchicine daily. + hyperuricemia     ROS:  See HPI, all others negative        Patient Active Problem List   Diagnosis Code    Essential hypertension I10    Mixed hyperlipidemia E78.2    Advanced directives, counseling/discussion Z71.89    Controlled type 2 diabetes mellitus with stage 3 chronic kidney disease, without long-term current use of insulin ([facility]) E11.22, N18.3         Current Outpatient Medications:     amLODIPine (NORVASC) 10 mg tablet, Take 1 tablet by mouth once daily, Disp: 90 Tab, Rfl: 1    furosemide (LASIX) 20 mg tablet, Take 1 tablet once daily as needed for edema, Disp: 90 Tab, Rfl: 1    simvastatin (ZOCOR) 40 mg tablet, Take 1 tablet by mouth nightly, Disp: 90 Tab, Rfl: 0    colchicine (Colcrys) 0.6 mg tablet, Take 1 Tab by mouth daily.  Indications: a type of joint disorder due to excess uric acid in the blood called gout, Disp: 90 Tab, Rfl: 2    cloNIDine HCL (CATAPRES) 0.2 mg tablet, Take 1 tablet by mouth twice daily, Disp: 180 Tab, Rfl: 1    hydrALAZINE (APRESOLINE) 100 mg tablet, TAKE 1 TABLET BY MOUTH THREE TIMES DAILY, Disp: 270 Tab, Rfl: 3    latanoprost (XALATAN) 0.005 % ophthalmic solution, , Disp: , Rfl:     glucose blood VI test strips (PRODIGY NO CODING) strip, Check fasting glucose once daily, Disp: 100 Strip, Rfl: 3    Blood-Glucose Meter (PRODIGY AUTOCODE MONITOR SYST) misc, Check fasting glucose once daily, Disp: 1 Each, Rfl: 0    cholecalciferol, VITAMIN D3, (VITAMIN D3) 5,000 unit tab tablet, Take  by mouth daily. , Disp: , Rfl:     Aspirin, Buffered 81 mg tab, Take 1 tablet by mouth daily. , Disp: , Rfl:     fish oil-dha-epa 1,200-144-216 mg cap, Take 1 tablet by mouth daily. , Disp: , Rfl:     OTHER, BMP on 9/6/19 morning Call report to PCP Reason: CKD, Disp: 1 Each, Rfl: 0    No Known Allergies    Past Medical History:   Diagnosis Date    Anemia     Carotid bruit 12/07/2013    Carotid Duplex: 1. Bilateral 1-49% stenosis of the internal carotid arteries. 2. No significant stenosis in the external carotid arteries bilaterally. 3. Antegrade flow in both vetebral arteries. 4. Normal flow in both subclavian arteries. Plaque Morphology: 1. Hyperechoic plaque in the bulb and right ICA. 2. Hyperechoic plaque in the bulb and left ICA.  CKD (chronic kidney disease) stage 3, GFR 30-59 ml/min     Diabetes ([facility])     NIDDM    Gastritis 10/27/2014    Duodenum Bx: No Specific Pathologic Abnormality. No Villous Abnormality. Gastric Body/Cardia Bx: Chronic Active Gastritis w/ Associated Reactive Changes. No dysplasia or malignancy identified. Bacterial Organisms c/w H. Pylori are present. Z-Line Bx: GE mucosa w/ Acute/Chronic Inflammation & Reactive Changes. Focal Specialized Type Campos Mucosa Identified. No Dysplasia or Malignancy Identified.      Gout 12/10/2009    Elevated Uric Acid Level    Hyperlipidemia     Hypertension     RAMÓN (iron deficiency anemia)        Social History     Social History    Marital status:      Spouse name: N/A    Number of children: N/A    Years of education: N/A     Occupational History    Not on file.      Social History Main Topics    Smoking status: Never Smoker    Smokeless tobacco: Never Used    Alcohol use No    Drug use: No    Sexual activity: Not Currently     Partners: Male     Birth control/ protection: None     Other Topics Concern    Not on file     Social History Narrative       Family History   Problem Relation Age of Onset    Hypertension Mother     Stroke Mother     Stroke Father          OBJECTIVE    Physical Exam:     Visit Vitals  /82 (BP 1 Location: Left arm, BP Patient Position: Sitting)   Pulse (!) 54   Temp 97.4 °F (36.3 °C) (Oral)   Resp 16   Ht 4' 11\" (1.499 m)   Wt 139 lb (63 kg)   SpO2 98%   BMI 28.07 kg/m²         General: alert, well-appearing, AA,  in no apparent distress or pain  Neck: supple, no adenopathy palpated  CVS: normal rate,  regular rhythm, + murmur  Lungs:clear to ausculation bilaterally, no crackles, wheezing or rhonchi noted  Skin: warm, no lesions, rashes noted  Psych:  mood and affect normal  Results for orders placed or performed during the hospital encounter of 08/27/20   GLUCOSE, POC   Result Value Ref Range    Glucose (POC) 129 (H) 70 - 110 mg/dL       ASSESSMENT/PLAN  Diagnoses and all orders for this visit:    Chronic gastritis without bleeding, unspecified gastritis type  EGD 8/2020 s/p dilation   Currently on protonix  Weight has maintained  Following Dr. Rufus Hernandez    Neuropathy  Trial gabapentin  Lumbar arthritis on xray- keep appt w/ Dr. Linda How    Hyperuricemia  Monitoring  Check levels prior to next vsiit    Controlled type 2 diabetes mellitus with stage 3 chronic kidney disease, without long-term current use of insulin ([facility] [address]   diet controlled  September 2019 admission for JOHNNY- this has improved, she has seen dr. Allan Quiroz recommending to continue to be off lisinopril and will monitor proteinuria  a1c 6.6  Check CMP, a1c, lipid panel, microalbumin  soon    Diabetes mellitus type 2, diet controlled ([facility])    Essential hypertension  Controlled  Cont   norvasc, clonidine, hydralazine  Cont  low dose lasix/kcl prn for leg edema  Avoid bb with h/o 1st deg av block    Leg edema  Advised compression stockings   Low salt diet,   Prn lasix/kcl     Mixed hyperlipidemia  Good range LDL <100, on zocor     Gout of foot, unspecified cause, unspecified chronicity, unspecified laterality  Controlled  On daily colchicine    Anemia of chronic disease  Stable, Baseline 10's  Monitoring  BMI 27  Encourage wt loss    Heart murmur  Aortic regurg  Asymptomatic  Monitoring, following cards 10/2019 recommending recheck echo in 2-3 years    CKD 3  Avoid nsaids, keep hydrated  monitoring  Following dr. Yuliya Johnson zaman    Weight loss  No other symptoms  Will check labs, add TSH test  increase protein/caloric intake  Monitoring     Follow-up Disposition:  Ff-up in 3 months or sooner prn, for weight check    Patient understands plan of care. Patient has provided input and agrees with goals. 2weeks

## 2020-09-03 NOTE — PROGRESS NOTE ADULT - PROBLEM SELECTOR PLAN 1
-Dyspnea multifactorial in the setting of underlying lung disease, cardiovascular dysfunction, obesity, and deconditioning.   -Was given prolonged course of Prednisone, now tapered off. Overnight and prn Bipap. Pt comfortable off Bipap and able to speak in full sentences.   - cont azithro, duonebs, spiriva, symbicort, singulair  -Continue supplemental O2 with goal O2 sat > 88% - she does not need to be at 100%.  -will check theophylline levels   -as per discussion with Dr. Mathew, pt was getting evaluated at [facility] by Dr. Cervantes for lung transplant. However she is NOT listed currently to receive a transplant.

## 2020-09-03 NOTE — CONSULT NOTE ADULT - SUBJECTIVE AND OBJECTIVE BOX
Patient is a 60y old  Female who presents with a chief complaint of 60F p/w generalized weakness and difficulty walking (03 Sep 2020 09:59)      HPI:  60F w/ end stage COPD on 3LNC (pending transplant list at [facility]), former smoker, CAD s/p stent (2016), afib on eliquis, uncontrolled DM2, raynaud phenomenon and recent hospitalization at [facility] for altered mental status and AURORA presents to [facility] for evaluation of generalized weakness and difficulty walking. Patient reports that she was recently hospitalized at [facility] from 7/25-7/31 for confusion and AURORA. At that time, she was told by her brother (lives downstairs in home) that she was not herself and did not recognize him which is why she was sent to hospital. Per chart, Dr. Mathew (PCP) has chart notes regarding possible hallucinations. While at [facility] she had head CT reported to be normal and she declined MR brain. Her Aurora (Cr increased to 1.5) was reported to resolve with IV fluid. She was discharged to home 1d prior to presentation to [facility] and reports since discharge she has had generalized weakness, inability to walk due to leg heaviness/weakness L>R, and sensation that she has retained fluid in her legs. At baseline she walks with a walker and per chart review her PCP was concerned this month that the patient was developing steroid-induced myopathy and planned on having patient evaluated by neurology as well as obtain MRI at [facility]. No reported fall, head trauma, seizure, paralysis, loss of sensation in extremities or saddle anesthesia, fever, chills, cp, cough, worsening baseline sob (uses 3LNC), n/v/d, inability to urinate or urinary incontinence, constipation or bowel incontinence, or rash (has bruised on arm form needle stick from recent hospitalization).    DENIES allergy to albuterol- never rash, no anaphylaxis per patient  In the ED, VS 97.5, 138/76, 69, 18 98%3LNC (01 Aug 2020 22:05)   second pulmonary opinion was called by Dr Mathew:  Pt has severe copd: Have finished the steroids taper:  On bipap and BD;  she is constantly SOB : she was using bipap at home at night time only but now since this admission she has to useit for 24 hours:  She ets dyspneic once she takes off the bipap     ?FOLLOWING PRESENT  [x ] Hx of PE/DVT, [y ] Hx COPD, [x ] Hx of Asthma, [y ] Hx of Hospitalization, [ y]  Hx of BiPAP/CPAP use, [? Hx of NILA    Allergies    No Known Allergies    Intolerances    albuterol (Unknown)      PAST MEDICAL & SURGICAL HISTORY:  Atrial fibrillation  Hyperlipemia  Chronic sinusitis  Raynaud phenomenon  HTN (hypertension)  DM (diabetes mellitus)  Claustrophobia  COPD (chronic obstructive pulmonary disease)  S/P tonsillectomy      FAMILY HISTORY:  FH: MI (myocardial infarction)  FH: CABG (coronary artery bypass surgery)      Social History: [35 pk years: quit 6 years ago   ] TOBACCO                  [  x] ETOH                                 [ x ] IVDA/DRUGS    REVIEW OF SYSTEMS      General:	x    Skin/Breast:x  	  Ophthalmologic:x  	  ENMT:	x    Respiratory and Thorax: SOB even at rest   	  Cardiovascular:	x    Gastrointestinal:	x    Genitourinary:	x    Musculoskeletal:	x    Neurological:	x    Psychiatric:	x    Hematology/Lymphatics:	x    Endocrine:	x    Allergic/Immunologic:	x    MEDICATIONS  (STANDING):  acetaminophen  IVPB .. 1000 milliGRAM(s) IV Intermittent once  albuterol/ipratropium for Nebulization 3 milliLiter(s) Nebulizer every 6 hours  apixaban 5 milliGRAM(s) Oral every 12 hours  ascorbic acid 500 milliGRAM(s) Oral daily  aspirin enteric coated 81 milliGRAM(s) Oral daily  atorvastatin 80 milliGRAM(s) Oral at bedtime  azithromycin   Tablet 250 milliGRAM(s) Oral <User Schedule>  Biotene Dry Mouth Oral Rinse 5 milliLiter(s) Swish and Spit two times a day  bisacodyl Suppository 10 milliGRAM(s) Rectal daily  budesonide 160 MICROgram(s)/formoterol 4.5 MICROgram(s) Inhaler 2 Puff(s) Inhalation two times a day  buMETAnide Infusion 0.5 mG/Hr (2.5 mL/Hr) IV Continuous <Continuous>  ceFAZolin   IVPB      ceFAZolin   IVPB 1000 milliGRAM(s) IV Intermittent every 8 hours  chlorhexidine 2% Cloths 1 Application(s) Topical daily  cholecalciferol 2000 Unit(s) Oral daily  dextrose 5%. 1000 milliLiter(s) (50 mL/Hr) IV Continuous <Continuous>  dextrose 50% Injectable 25 Gram(s) IV Push once  diltiazem    milliGRAM(s) Oral daily  ferrous    sulfate 325 milliGRAM(s) Oral two times a day  insulin glargine Injectable (LANTUS) 16 Unit(s) SubCutaneous at bedtime  insulin lispro (HumaLOG) corrective regimen sliding scale   SubCutaneous three times a day before meals  insulin lispro (HumaLOG) corrective regimen sliding scale   SubCutaneous at bedtime  insulin lispro Injectable (HumaLOG) 4 Unit(s) SubCutaneous three times a day with meals  lactulose Syrup 15 Gram(s) Oral two times a day  montelukast 10 milliGRAM(s) Oral daily  multivitamin 1 Tablet(s) Oral daily  mupirocin 2% Ointment 1 Application(s) Topical <User Schedule>  pantoprazole    Tablet 40 milliGRAM(s) Oral before breakfast  polyethylene glycol 3350 17 Gram(s) Oral daily  senna 2 Tablet(s) Oral at bedtime  theophylline ER (24 Hour) 400 milliGRAM(s) Oral daily  tiotropium 18 MICROgram(s) Capsule 1 Capsule(s) Inhalation daily    MEDICATIONS  (PRN):  glucagon  Injectable 1 milliGRAM(s) IntraMuscular once PRN Glucose LESS THAN 70 milligrams/deciliter  guaiFENesin   Syrup  (Sugar-Free) 100 milliGRAM(s) Oral every 6 hours PRN Cough  oxyCODONE    IR 5 milliGRAM(s) Oral every 6 hours PRN Severe Pain (7 - 10)       Vital Signs Last 24 Hrs  T(C): 36.8 (03 Sep 2020 05:13), Max: 36.8 (02 Sep 2020 15:59)  T(F): 98.3 (03 Sep 2020 05:13), Max: 98.3 (02 Sep 2020 15:59)  HR: 86 (03 Sep 2020 05:23) (78 - 98)  BP: 153/71 (03 Sep 2020 05:13) (137/85 - 160/75)  BP(mean): --  RR: 20 (03 Sep 2020 05:13) (18 - 20)  SpO2: 98% (03 Sep 2020 05:23) (96% - 99%)        I&O's Summary    02 Sep 2020 07:01  -  03 Sep 2020 07:00  --------------------------------------------------------  IN: 430 mL / OUT: 3100 mL / NET: -2670 mL        Physical Exam:   GENERAL: NAD, well-groomed, well-developed  HEENT: MITCH/   Atraumatic, Normocephalic  ENMT: No tonsillar erythema, exudates, or enlargement; Moist mucous membranes, Good dentition, No lesions  NECK: Supple, No JVD, Normal thyroid  CHEST/LUNG: very poor air entry bilaterally:   CVS: Regular rate and rhythm; No murmurs, rubs, or gallops  GI: : Soft, Nontender, Nondistended; Bowel sounds present  NERVOUS SYSTEM:  Alert & Oriented X3  EXTREMITIES:  ++ erythematous edema  LYMPH: No lymphadenopathy noted  SKIN: No rashes or lesions  ENDOCRINOLOGY: No Thyromegaly  PSYCH: Appropriate    Labs:  ABG - ( 01 Sep 2020 16:52 )  pH, Arterial: 7.40  pH, Blood: x     /  pCO2: 68    /  pO2: 90    / HCO3: 41    / Base Excess: 14.3  /  SaO2: 97                                          8.3    6.75  )-----------( 300      ( 03 Sep 2020 08:49 )             29.0                         8.2    7.30  )-----------( 197      ( 01 Sep 2020 09:15 )             28.8                         8.2    8.29  )-----------( 184      ( 31 Aug 2020 09:41 )             29.2     09-03    140  |  90<L>  |  18  ----------------------------<  76  3.9   |  42<H>  |  1.09  09-02    140  |  93<L>  |  19  ----------------------------<  85  3.7   |  39<H>  |  1.00  09-02    140  |  91<L>  |  20  ----------------------------<  115<H>  3.7   |  40<H>  |  0.93  09-01    142  |  95<L>  |  21  ----------------------------<  70  4.6   |  43<H>  |  1.09  08-31    138  |  92<L>  |  21  ----------------------------<  80  4.8   |  40<H>  |  1.01    Ca    9.9      03 Sep 2020 08:49  Ca    10.4      02 Sep 2020 18:45  Ca    10.2      02 Sep 2020 07:01  Ca    10.2      01 Sep 2020 12:26  Phos  4.5     09-03  Phos  4.3     09-02  Mg     1.9     09-03  Mg     2.0     09-02    TPro  6.1  /  Alb  3.6  /  TBili  0.2  /  DBili  x   /  AST  26  /  ALT  13  /  AlkPhos  65  08-31    CAPILLARY BLOOD GLUCOSE      POCT Blood Glucose.: 73 mg/dL (03 Sep 2020 09:30)  POCT Blood Glucose.: 107 mg/dL (02 Sep 2020 21:39)  POCT Blood Glucose.: 99 mg/dL (02 Sep 2020 18:51)  POCT Blood Glucose.: 96 mg/dL (02 Sep 2020 14:40)      < from: CT Chest No Cont (12.11.19 @ 18:06) >    PROCEDURE:   CT of the Chest was performed without intravenous contrast.  Sagittal and coronal reformats were performed.      FINDINGS:    LUNGS AND AIRWAYS: Patent central airways. No infectious consolidation. 1   cm right lower lobe subpleural nodule (2:96). Centrilobular emphysema.    PLEURA: No pleural effusion.    MEDIASTINUM AND LILIANA: Small mediastinal lymph nodes.    VESSELS: Coronary atherosclerosis.    HEART: Heart size is normal. No pericardial effusion.    CHEST WALL AND LOWER NECK: Within normal limits.    VISUALIZED UPPER ABDOMEN: Within normal limits.    BONES: Degenerative changes.    IMPRESSION:     No infectious consolidation.    1 cm right lower lobe subpleural nodule or atelectatic focus. Follow-up   CT chest in 3 months could be considered.            < from: Xray Chest 1 View- PORTABLE-Routine (08.01.20 @ 16:44) >    EXAM:  XR CHEST PORTABLE ROUTINE 1V                            PROCEDURE DATE:  08/01/2020            INTERPRETATION:  CLINICAL INFORMATION: Screening, history COPD.    TECHNIQUE: Single portable view of the chest.    COMPARISON: 12/11/2019.      IMPRESSION:    The lungs are clear.  Heart size cannot be accurately assessed in this projection.  No acute bone abnormality.              GOVIND SIMMONS M.D., RESIDENT RADIOLOGIST  This document has been electronically signed.  CHELSEY SMITH M.D., ATTENDING RADIOLOGIST  This document has been electronically signed. Aug  2 2020  9:05AM              < end of copied text >      PAT VASQUEZ M.D., RADIOLOGY RESIDENT  This document has been electronically signed.  JOSH JOLLEY M.D., ATTENDING RADIOLOGIST  This document has been electronically signed. Dec 12 2019 11:46AM              < end of copied text >      D DImer      Studies  Chest X-RAY  CT SCAN Chest   CT Abdomen  Venous Dopplers: LE:   Others        < from: Transthoracic Echocardiogram (08.05.20 @ 02:57) >  Observations:  Mitral Valve: Normal mitral valve. Minimal mitral  regurgitation.  Aortic Valve/Aorta: Aortic valve not well visualized. Peak  transaortic valve gradient equals 13 mm Hg, mean  transaortic valve gradient equals 6 mm Hg, aortic valve  velocity time integral equals 30 cm. Peak left ventricular  outflow tract gradient equals 5 mm Hg, mean gradient is  equal to 2 mm Hg, LVOT velocity time integral equals 15 cm.  Aortic Root: 3.4 cm.  Left Atrium: Normal left atrium.  Left Ventricle: Normal left ventricular systolic function.  No segmental wall motion abnormalities. Normal left  ventricular internal dimensions and wall thicknesses. Mild  diastolic dysfunction (Stage I).  Right Heart: Normal right atrium. Normal right ventricular  size and systolic function. Normal tricuspid valve.  Pericardium/Pleura: Normal pericardium with no pericardial  effusion.  Hemodynamic: Estimated right atrial pressure is 8 mm Hg.  ------------------------------------------------------------------------  Conclusions:  1. Normal left ventricular internal dimensions and wall  thicknesses.  2. Normal left ventricular systolic function. No segmental  wall motion abnormalities.  3. Mild diastolic dysfunction (Stage I).  4. Normal right ventricular size and systolic function.  ------------------------------------------------------------------------  Confirmed on  8/5/2020 - 17:24:43 by Jamil Pedro M.D.  ------------------------------------------------------------------------    < end of copied text >

## 2020-09-03 NOTE — PROGRESS NOTE ADULT - ATTENDING COMMENTS
Agree with above NP note.  cv stable  remains overloaded  diuresing well with bumex gtt  continue as ordered  diamox prn per pulmo/med  monitor bmp closely

## 2020-09-03 NOTE — PROGRESS NOTE ADULT - SUBJECTIVE AND OBJECTIVE BOX
Patient is a 60y old  Female who presents with a chief complaint of 60F p/w generalized weakness and difficulty walking (03 Sep 2020 11:13)      SUBJECTIVE / OVERNIGHT EVENTS: patient reports slightly decreased edema in her lower extremities     MEDICATIONS  (STANDING):  acetaminophen  IVPB .. 1000 milliGRAM(s) IV Intermittent once  albuterol/ipratropium for Nebulization 3 milliLiter(s) Nebulizer every 6 hours  apixaban 5 milliGRAM(s) Oral every 12 hours  ascorbic acid 500 milliGRAM(s) Oral daily  aspirin enteric coated 81 milliGRAM(s) Oral daily  atorvastatin 80 milliGRAM(s) Oral at bedtime  azithromycin   Tablet 250 milliGRAM(s) Oral <User Schedule>  Biotene Dry Mouth Oral Rinse 5 milliLiter(s) Swish and Spit two times a day  bisacodyl Suppository 10 milliGRAM(s) Rectal daily  budesonide 160 MICROgram(s)/formoterol 4.5 MICROgram(s) Inhaler 2 Puff(s) Inhalation two times a day  buMETAnide Infusion 0.5 mG/Hr (2.5 mL/Hr) IV Continuous <Continuous>  ceFAZolin   IVPB      ceFAZolin   IVPB 1000 milliGRAM(s) IV Intermittent every 8 hours  chlorhexidine 2% Cloths 1 Application(s) Topical daily  cholecalciferol 2000 Unit(s) Oral daily  dextrose 5%. 1000 milliLiter(s) (50 mL/Hr) IV Continuous <Continuous>  dextrose 50% Injectable 25 Gram(s) IV Push once  diltiazem    milliGRAM(s) Oral daily  ferrous    sulfate 325 milliGRAM(s) Oral two times a day  insulin glargine Injectable (LANTUS) 16 Unit(s) SubCutaneous at bedtime  insulin lispro (HumaLOG) corrective regimen sliding scale   SubCutaneous three times a day before meals  insulin lispro (HumaLOG) corrective regimen sliding scale   SubCutaneous at bedtime  insulin lispro Injectable (HumaLOG) 4 Unit(s) SubCutaneous three times a day with meals  lactulose Syrup 15 Gram(s) Oral two times a day  montelukast 10 milliGRAM(s) Oral daily  multivitamin 1 Tablet(s) Oral daily  mupirocin 2% Ointment 1 Application(s) Topical <User Schedule>  pantoprazole    Tablet 40 milliGRAM(s) Oral before breakfast  polyethylene glycol 3350 17 Gram(s) Oral daily  senna 2 Tablet(s) Oral at bedtime  theophylline ER (24 Hour) 400 milliGRAM(s) Oral daily  tiotropium 18 MICROgram(s) Capsule 1 Capsule(s) Inhalation daily    MEDICATIONS  (PRN):  glucagon  Injectable 1 milliGRAM(s) IntraMuscular once PRN Glucose LESS THAN 70 milligrams/deciliter  guaiFENesin   Syrup  (Sugar-Free) 100 milliGRAM(s) Oral every 6 hours PRN Cough  oxyCODONE    IR 5 milliGRAM(s) Oral every 6 hours PRN Severe Pain (7 - 10)      Vital Signs Last 24 Hrs  T(C): 36.8 (03 Sep 2020 05:13), Max: 36.8 (02 Sep 2020 15:59)  T(F): 98.3 (03 Sep 2020 05:13), Max: 98.3 (02 Sep 2020 15:59)  HR: 86 (03 Sep 2020 05:23) (78 - 98)  BP: 153/71 (03 Sep 2020 05:13) (137/85 - 160/75)  BP(mean): --  RR: 20 (03 Sep 2020 05:13) (18 - 20)  SpO2: 98% (03 Sep 2020 05:23) (96% - 99%)  CAPILLARY BLOOD GLUCOSE      POCT Blood Glucose.: 73 mg/dL (03 Sep 2020 09:30)  POCT Blood Glucose.: 107 mg/dL (02 Sep 2020 21:39)  POCT Blood Glucose.: 99 mg/dL (02 Sep 2020 18:51)  POCT Blood Glucose.: 96 mg/dL (02 Sep 2020 14:40)    I&O's Summary    02 Sep 2020 07:01  -  03 Sep 2020 07:00  --------------------------------------------------------  IN: 430 mL / OUT: 3100 mL / NET: -2670 mL        PHYSICAL EXAM:  GENERAL: NAD  EYES:  conjunctiva and sclera clear  CHEST/LUNG: Clear to auscultation bilaterally; No wheeze  HEART: +S1/S2, reg   ABDOMEN: Soft, Nontender, Nondistended; Bowel sounds present  EXTREMITIES: +2 pitting edema to the thighs b/l   PSYCH: AAOx3      LABS:                        8.3    6.75  )-----------( 300      ( 03 Sep 2020 08:49 )             29.0     09-03    140  |  90<L>  |  18  ----------------------------<  76  3.9   |  42<H>  |  1.09    Ca    9.9      03 Sep 2020 08:49  Phos  4.5     09-03  Mg     1.9     09-03      Care Discussed with Consultants/Other Providers: Dr. Nadia Mathew (outpt pulmonologist) regarding pt's lung transplant evaluation process

## 2020-09-03 NOTE — PROGRESS NOTE ADULT - SUBJECTIVE AND OBJECTIVE BOX
CARDIOLOGY FOLLOW UP - Dr. Brunson    CC no new complaints       PHYSICAL EXAM:  T(C): 36.8 (09-03-20 @ 05:13), Max: 36.8 (09-02-20 @ 15:59)  HR: 86 (09-03-20 @ 05:23) (78 - 98)  BP: 153/71 (09-03-20 @ 05:13) (137/85 - 160/75)  RR: 20 (09-03-20 @ 05:13) (18 - 20)  SpO2: 98% (09-03-20 @ 05:23) (96% - 99%)  Wt(kg): --  I&O's Summary    02 Sep 2020 07:01  -  03 Sep 2020 07:00  --------------------------------------------------------  IN: 430 mL / OUT: 3100 mL / NET: -2670 mL        Appearance: Normal	  Cardiovascular: Normal S1 S2,RRRs  Respiratory:  diminished 	  Gastrointestinal:  Soft, Non-tender, + BS	  Extremities: bl le edema ++        MEDICATIONS  (STANDING):  acetaminophen  IVPB .. 1000 milliGRAM(s) IV Intermittent once  albuterol/ipratropium for Nebulization 3 milliLiter(s) Nebulizer every 6 hours  apixaban 5 milliGRAM(s) Oral every 12 hours  ascorbic acid 500 milliGRAM(s) Oral daily  aspirin enteric coated 81 milliGRAM(s) Oral daily  atorvastatin 80 milliGRAM(s) Oral at bedtime  azithromycin   Tablet 250 milliGRAM(s) Oral <User Schedule>  Biotene Dry Mouth Oral Rinse 5 milliLiter(s) Swish and Spit two times a day  bisacodyl Suppository 10 milliGRAM(s) Rectal daily  budesonide 160 MICROgram(s)/formoterol 4.5 MICROgram(s) Inhaler 2 Puff(s) Inhalation two times a day  buMETAnide Infusion 0.5 mG/Hr (2.5 mL/Hr) IV Continuous <Continuous>  ceFAZolin   IVPB      ceFAZolin   IVPB 1000 milliGRAM(s) IV Intermittent every 8 hours  chlorhexidine 2% Cloths 1 Application(s) Topical daily  cholecalciferol 2000 Unit(s) Oral daily  dextrose 5%. 1000 milliLiter(s) (50 mL/Hr) IV Continuous <Continuous>  dextrose 50% Injectable 25 Gram(s) IV Push once  diltiazem    milliGRAM(s) Oral daily  ferrous    sulfate 325 milliGRAM(s) Oral two times a day  insulin glargine Injectable (LANTUS) 16 Unit(s) SubCutaneous at bedtime  insulin lispro (HumaLOG) corrective regimen sliding scale   SubCutaneous three times a day before meals  insulin lispro (HumaLOG) corrective regimen sliding scale   SubCutaneous at bedtime  insulin lispro Injectable (HumaLOG) 4 Unit(s) SubCutaneous three times a day with meals  lactulose Syrup 15 Gram(s) Oral two times a day  montelukast 10 milliGRAM(s) Oral daily  multivitamin 1 Tablet(s) Oral daily  mupirocin 2% Ointment 1 Application(s) Topical <User Schedule>  pantoprazole    Tablet 40 milliGRAM(s) Oral before breakfast  polyethylene glycol 3350 17 Gram(s) Oral daily  senna 2 Tablet(s) Oral at bedtime  theophylline ER (24 Hour) 400 milliGRAM(s) Oral daily  tiotropium 18 MICROgram(s) Capsule 1 Capsule(s) Inhalation daily      TELEMETRY: 	    ECG:  	  RADIOLOGY:   DIAGNOSTIC TESTING:  [ ] Echocardiogram:  [ ]  Catheterization:  [ ] Stress Test:    OTHER: 	    LABS:	 	                            8.3    6.75  )-----------( 300      ( 03 Sep 2020 08:49 )             29.0     09-03    140  |  90<L>  |  18  ----------------------------<  76  3.9   |  42<H>  |  1.09    Ca    9.9      03 Sep 2020 08:49  Phos  4.5     09-03  Mg     1.9     09-03

## 2020-09-04 LAB
ANION GAP SERPL CALC-SCNC: 7 MMOL/L — SIGNIFICANT CHANGE UP (ref 5–17)
ANION GAP SERPL CALC-SCNC: 8 MMOL/L — SIGNIFICANT CHANGE UP (ref 5–17)
BUN SERPL-MCNC: 18 MG/DL — SIGNIFICANT CHANGE UP (ref 7–23)
BUN SERPL-MCNC: 18 MG/DL — SIGNIFICANT CHANGE UP (ref 7–23)
CALCIUM SERPL-MCNC: 9.6 MG/DL — SIGNIFICANT CHANGE UP (ref 8.4–10.5)
CALCIUM SERPL-MCNC: 9.7 MG/DL — SIGNIFICANT CHANGE UP (ref 8.4–10.5)
CHLORIDE SERPL-SCNC: 89 MMOL/L — LOW (ref 96–108)
CHLORIDE SERPL-SCNC: 90 MMOL/L — LOW (ref 96–108)
CO2 SERPL-SCNC: 44 MMOL/L — HIGH (ref 22–31)
CO2 SERPL-SCNC: 45 MMOL/L — CRITICAL HIGH (ref 22–31)
CREAT SERPL-MCNC: 1.09 MG/DL — SIGNIFICANT CHANGE UP (ref 0.5–1.3)
CREAT SERPL-MCNC: 1.1 MG/DL — SIGNIFICANT CHANGE UP (ref 0.5–1.3)
GLUCOSE BLDC GLUCOMTR-MCNC: 114 MG/DL — HIGH (ref 70–99)
GLUCOSE BLDC GLUCOMTR-MCNC: 115 MG/DL — HIGH (ref 70–99)
GLUCOSE BLDC GLUCOMTR-MCNC: 188 MG/DL — HIGH (ref 70–99)
GLUCOSE BLDC GLUCOMTR-MCNC: 193 MG/DL — HIGH (ref 70–99)
GLUCOSE BLDC GLUCOMTR-MCNC: 40 MG/DL — CRITICAL LOW (ref 70–99)
GLUCOSE BLDC GLUCOMTR-MCNC: 44 MG/DL — CRITICAL LOW (ref 70–99)
GLUCOSE BLDC GLUCOMTR-MCNC: 77 MG/DL — SIGNIFICANT CHANGE UP (ref 70–99)
GLUCOSE BLDC GLUCOMTR-MCNC: 98 MG/DL — SIGNIFICANT CHANGE UP (ref 70–99)
GLUCOSE SERPL-MCNC: 90 MG/DL — SIGNIFICANT CHANGE UP (ref 70–99)
GLUCOSE SERPL-MCNC: 92 MG/DL — SIGNIFICANT CHANGE UP (ref 70–99)
HCT VFR BLD CALC: 29.4 % — LOW (ref 34.5–45)
HGB BLD-MCNC: 8.5 G/DL — LOW (ref 11.5–15.5)
MAGNESIUM SERPL-MCNC: 1.8 MG/DL — SIGNIFICANT CHANGE UP (ref 1.6–2.6)
MCHC RBC-ENTMCNC: 25.1 PG — LOW (ref 27–34)
MCHC RBC-ENTMCNC: 28.9 GM/DL — LOW (ref 32–36)
MCV RBC AUTO: 87 FL — SIGNIFICANT CHANGE UP (ref 80–100)
NRBC # BLD: 0 /100 WBCS — SIGNIFICANT CHANGE UP (ref 0–0)
PHOSPHATE SERPL-MCNC: 4.7 MG/DL — HIGH (ref 2.5–4.5)
PLATELET # BLD AUTO: 344 K/UL — SIGNIFICANT CHANGE UP (ref 150–400)
POTASSIUM SERPL-MCNC: 3.5 MMOL/L — SIGNIFICANT CHANGE UP (ref 3.5–5.3)
POTASSIUM SERPL-MCNC: 3.6 MMOL/L — SIGNIFICANT CHANGE UP (ref 3.5–5.3)
POTASSIUM SERPL-SCNC: 3.5 MMOL/L — SIGNIFICANT CHANGE UP (ref 3.5–5.3)
POTASSIUM SERPL-SCNC: 3.6 MMOL/L — SIGNIFICANT CHANGE UP (ref 3.5–5.3)
RBC # BLD: 3.38 M/UL — LOW (ref 3.8–5.2)
RBC # FLD: 18.6 % — HIGH (ref 10.3–14.5)
SODIUM SERPL-SCNC: 141 MMOL/L — SIGNIFICANT CHANGE UP (ref 135–145)
SODIUM SERPL-SCNC: 142 MMOL/L — SIGNIFICANT CHANGE UP (ref 135–145)
THEOPHYLLINE SERPL-MCNC: 3.2 UG/ML — LOW (ref 10–20)
WBC # BLD: 7.79 K/UL — SIGNIFICANT CHANGE UP (ref 3.8–10.5)
WBC # FLD AUTO: 7.79 K/UL — SIGNIFICANT CHANGE UP (ref 3.8–10.5)

## 2020-09-04 PROCEDURE — 99233 SBSQ HOSP IP/OBS HIGH 50: CPT

## 2020-09-04 RX ORDER — ACETAZOLAMIDE 250 MG/1
250 TABLET ORAL ONCE
Refills: 0 | Status: DISCONTINUED | OUTPATIENT
Start: 2020-09-04 | End: 2020-09-04

## 2020-09-04 RX ORDER — ALPRAZOLAM 0.25 MG
0.25 TABLET ORAL ONCE
Refills: 0 | Status: DISCONTINUED | OUTPATIENT
Start: 2020-09-04 | End: 2020-09-04

## 2020-09-04 RX ORDER — ACETAZOLAMIDE 250 MG/1
250 TABLET ORAL ONCE
Refills: 0 | Status: COMPLETED | OUTPATIENT
Start: 2020-09-04 | End: 2020-09-04

## 2020-09-04 RX ADMIN — Medication 100 MILLIGRAM(S): at 15:28

## 2020-09-04 RX ADMIN — BUMETANIDE 2.5 MG/HR: 0.25 INJECTION INTRAMUSCULAR; INTRAVENOUS at 07:02

## 2020-09-04 RX ADMIN — Medication 500 MILLIGRAM(S): at 13:36

## 2020-09-04 RX ADMIN — Medication 325 MILLIGRAM(S): at 06:39

## 2020-09-04 RX ADMIN — BUDESONIDE AND FORMOTEROL FUMARATE DIHYDRATE 2 PUFF(S): 160; 4.5 AEROSOL RESPIRATORY (INHALATION) at 17:40

## 2020-09-04 RX ADMIN — APIXABAN 5 MILLIGRAM(S): 2.5 TABLET, FILM COATED ORAL at 17:39

## 2020-09-04 RX ADMIN — Medication 1 TABLET(S): at 13:36

## 2020-09-04 RX ADMIN — Medication 180 MILLIGRAM(S): at 06:39

## 2020-09-04 RX ADMIN — Medication 4 UNIT(S): at 19:17

## 2020-09-04 RX ADMIN — Medication 100 MILLIGRAM(S): at 23:28

## 2020-09-04 RX ADMIN — INSULIN GLARGINE 16 UNIT(S): 100 INJECTION, SOLUTION SUBCUTANEOUS at 23:08

## 2020-09-04 RX ADMIN — Medication 2000 UNIT(S): at 13:35

## 2020-09-04 RX ADMIN — Medication 5 MILLILITER(S): at 06:39

## 2020-09-04 RX ADMIN — Medication 3 MILLILITER(S): at 23:08

## 2020-09-04 RX ADMIN — Medication 400 MILLIGRAM(S): at 13:36

## 2020-09-04 RX ADMIN — BUDESONIDE AND FORMOTEROL FUMARATE DIHYDRATE 2 PUFF(S): 160; 4.5 AEROSOL RESPIRATORY (INHALATION) at 13:37

## 2020-09-04 RX ADMIN — Medication 1: at 19:17

## 2020-09-04 RX ADMIN — Medication 100 MILLIGRAM(S): at 07:02

## 2020-09-04 RX ADMIN — Medication 81 MILLIGRAM(S): at 13:36

## 2020-09-04 RX ADMIN — Medication 4 UNIT(S): at 09:55

## 2020-09-04 RX ADMIN — Medication 0.25 MILLIGRAM(S): at 23:07

## 2020-09-04 RX ADMIN — APIXABAN 5 MILLIGRAM(S): 2.5 TABLET, FILM COATED ORAL at 06:39

## 2020-09-04 RX ADMIN — MONTELUKAST 10 MILLIGRAM(S): 4 TABLET, CHEWABLE ORAL at 13:36

## 2020-09-04 RX ADMIN — MUPIROCIN 1 APPLICATION(S): 20 OINTMENT TOPICAL at 17:39

## 2020-09-04 RX ADMIN — Medication 3 MILLILITER(S): at 13:34

## 2020-09-04 RX ADMIN — PANTOPRAZOLE SODIUM 40 MILLIGRAM(S): 20 TABLET, DELAYED RELEASE ORAL at 06:39

## 2020-09-04 RX ADMIN — BUMETANIDE 2.5 MG/HR: 0.25 INJECTION INTRAMUSCULAR; INTRAVENOUS at 15:29

## 2020-09-04 RX ADMIN — OXYCODONE HYDROCHLORIDE 5 MILLIGRAM(S): 5 TABLET ORAL at 23:07

## 2020-09-04 RX ADMIN — Medication 325 MILLIGRAM(S): at 17:39

## 2020-09-04 RX ADMIN — Medication 5 MILLILITER(S): at 23:29

## 2020-09-04 RX ADMIN — ACETAZOLAMIDE 250 MILLIGRAM(S): 250 TABLET ORAL at 15:29

## 2020-09-04 RX ADMIN — ATORVASTATIN CALCIUM 80 MILLIGRAM(S): 80 TABLET, FILM COATED ORAL at 23:07

## 2020-09-04 RX ADMIN — BUMETANIDE 2.5 MG/HR: 0.25 INJECTION INTRAMUSCULAR; INTRAVENOUS at 06:41

## 2020-09-04 RX ADMIN — SENNA PLUS 2 TABLET(S): 8.6 TABLET ORAL at 23:07

## 2020-09-04 RX ADMIN — TIOTROPIUM BROMIDE 1 CAPSULE(S): 18 CAPSULE ORAL; RESPIRATORY (INHALATION) at 13:35

## 2020-09-04 RX ADMIN — Medication 3 MILLILITER(S): at 17:40

## 2020-09-04 RX ADMIN — AZITHROMYCIN 250 MILLIGRAM(S): 500 TABLET, FILM COATED ORAL at 18:23

## 2020-09-04 RX ADMIN — CHLORHEXIDINE GLUCONATE 1 APPLICATION(S): 213 SOLUTION TOPICAL at 13:46

## 2020-09-04 NOTE — PROGRESS NOTE ADULT - PROBLEM SELECTOR PLAN 1
-Dyspnea multifactorial in the setting of underlying lung disease, cardiovascular dysfunction, obesity, and deconditioning.   -Was given prolonged course of Prednisone, now tapered off. Overnight and prn Bipap. Pt comfortable off Bipap and able to speak in full sentences.   - cont azithro, duonebs, spiriva, symbicort, singulair  -Continue supplemental O2 with goal O2 sat > 88% - she does not need to be at 100%.  -will check theophylline levels   -as per discussion with Dr. Mathew, pt was getting evaluated at [facility] by Dr. Cervantes for lung transplant. However she is NOT listed currently to receive a transplant.

## 2020-09-04 NOTE — PROGRESS NOTE ADULT - SUBJECTIVE AND OBJECTIVE BOX
Patient is a 60y old  Female who presents with a chief complaint of 60F p/w generalized weakness and difficulty walking (04 Sep 2020 11:46)      Any change in ROS: Seems  better today     MEDICATIONS  (STANDING):  acetaminophen  IVPB .. 1000 milliGRAM(s) IV Intermittent once  acetaZOLAMIDE  IVPB 250 milliGRAM(s) IV Intermittent once  albuterol/ipratropium for Nebulization 3 milliLiter(s) Nebulizer every 6 hours  apixaban 5 milliGRAM(s) Oral every 12 hours  ascorbic acid 500 milliGRAM(s) Oral daily  aspirin enteric coated 81 milliGRAM(s) Oral daily  atorvastatin 80 milliGRAM(s) Oral at bedtime  azithromycin   Tablet 250 milliGRAM(s) Oral <User Schedule>  Biotene Dry Mouth Oral Rinse 5 milliLiter(s) Swish and Spit two times a day  bisacodyl Suppository 10 milliGRAM(s) Rectal daily  budesonide 160 MICROgram(s)/formoterol 4.5 MICROgram(s) Inhaler 2 Puff(s) Inhalation two times a day  buMETAnide Infusion 0.5 mG/Hr (2.5 mL/Hr) IV Continuous <Continuous>  ceFAZolin   IVPB      ceFAZolin   IVPB 1000 milliGRAM(s) IV Intermittent every 8 hours  chlorhexidine 2% Cloths 1 Application(s) Topical daily  cholecalciferol 2000 Unit(s) Oral daily  dextrose 5%. 1000 milliLiter(s) (50 mL/Hr) IV Continuous <Continuous>  dextrose 50% Injectable 25 Gram(s) IV Push once  diltiazem    milliGRAM(s) Oral daily  ferrous    sulfate 325 milliGRAM(s) Oral two times a day  insulin glargine Injectable (LANTUS) 16 Unit(s) SubCutaneous at bedtime  insulin lispro (HumaLOG) corrective regimen sliding scale   SubCutaneous three times a day before meals  insulin lispro (HumaLOG) corrective regimen sliding scale   SubCutaneous at bedtime  insulin lispro Injectable (HumaLOG) 4 Unit(s) SubCutaneous three times a day with meals  lactulose Syrup 15 Gram(s) Oral two times a day  montelukast 10 milliGRAM(s) Oral daily  multivitamin 1 Tablet(s) Oral daily  mupirocin 2% Ointment 1 Application(s) Topical <User Schedule>  pantoprazole    Tablet 40 milliGRAM(s) Oral before breakfast  polyethylene glycol 3350 17 Gram(s) Oral daily  senna 2 Tablet(s) Oral at bedtime  theophylline ER (24 Hour) 400 milliGRAM(s) Oral daily  tiotropium 18 MICROgram(s) Capsule 1 Capsule(s) Inhalation daily    MEDICATIONS  (PRN):  glucagon  Injectable 1 milliGRAM(s) IntraMuscular once PRN Glucose LESS THAN 70 milligrams/deciliter  guaiFENesin   Syrup  (Sugar-Free) 100 milliGRAM(s) Oral every 6 hours PRN Cough  oxyCODONE    IR 5 milliGRAM(s) Oral every 6 hours PRN Severe Pain (7 - 10)    Vital Signs Last 24 Hrs  T(C): 36.7 (04 Sep 2020 13:32), Max: 36.7 (03 Sep 2020 15:50)  T(F): 98 (04 Sep 2020 13:32), Max: 98 (03 Sep 2020 15:50)  HR: 60 (04 Sep 2020 13:32) (60 - 90)  BP: 125/65 (04 Sep 2020 13:32) (125/65 - 142/69)  BP(mean): --  RR: 20 (04 Sep 2020 13:32) (18 - 20)  SpO2: 100% (04 Sep 2020 13:32) (97% - 100%)    I&O's Summary    03 Sep 2020 07:01  -  04 Sep 2020 07:00  --------------------------------------------------------  IN: 420 mL / OUT: 2500 mL / NET: -2080 mL    04 Sep 2020 07:01  -  04 Sep 2020 14:30  --------------------------------------------------------  IN: 600 mL / OUT: 500 mL / NET: 100 mL          Physical Exam:   GENERAL: NAD, well-groomed, well-developed  HEENT: MITCH/   Atraumatic, Normocephalic  ENMT: No tonsillar erythema, exudates, or enlargement; Moist mucous membranes, Good dentition, No lesions  NECK: Supple, No JVD, Normal thyroid  CHEST/LUNG:poor air entery   CVS: Regular rate and rhythm; No murmurs, rubs, or gallops  GI: : Soft, Nontender, Nondistended; Bowel sounds present  NERVOUS SYSTEM:  Alert & Oriented X3  EXTREMITIES:  2+ Peripheral Pulses, No clubbing, cyanosis, or edema  LYMPH: No lymphadenopathy noted  SKIN: No rashes or lesions  ENDOCRINOLOGY: No Thyromegaly  PSYCH: Appropriate    Labs:                              8.5    7.79  )-----------( 344      ( 04 Sep 2020 09:37 )             29.4                         8.3    6.75  )-----------( 300      ( 03 Sep 2020 08:49 )             29.0                         8.2    7.30  )-----------( 197      ( 01 Sep 2020 09:15 )             28.8     09-04    141  |  89<L>  |  18  ----------------------------<  90  3.5   |  45<HH>  |  1.09  09-03    140  |  90<L>  |  18  ----------------------------<  76  3.9   |  42<H>  |  1.09  09-02    140  |  93<L>  |  19  ----------------------------<  85  3.7   |  39<H>  |  1.00  09-02    140  |  91<L>  |  20  ----------------------------<  115<H>  3.7   |  40<H>  |  0.93  09-01    142  |  95<L>  |  21  ----------------------------<  70  4.6   |  43<H>  |  1.09    Ca    9.7      04 Sep 2020 09:37  Ca    9.9      03 Sep 2020 08:49  Ca    10.4      02 Sep 2020 18:45  Phos  4.7     09-04  Phos  4.5     09-03  Phos  4.3     09-02  Mg     1.8     09-04  Mg     1.9     09-03  Mg     2.0     09-02      CAPILLARY BLOOD GLUCOSE      POCT Blood Glucose.: 77 mg/dL (04 Sep 2020 14:24)  POCT Blood Glucose.: 44 mg/dL (04 Sep 2020 14:04)  POCT Blood Glucose.: 40 mg/dL (04 Sep 2020 14:03)  POCT Blood Glucose.: 98 mg/dL (04 Sep 2020 09:36)  POCT Blood Glucose.: 104 mg/dL (03 Sep 2020 21:44)  POCT Blood Glucose.: 114 mg/dL (03 Sep 2020 18:56)        < from: Xray Chest 1 View- PORTABLE-Routine (08.01.20 @ 16:44) >    EXAM:  XR CHEST PORTABLE ROUTINE 1V                            PROCEDURE DATE:  08/01/2020            INTERPRETATION:  CLINICAL INFORMATION: Screening, history COPD.    TECHNIQUE: Single portable view of the chest.    COMPARISON: 12/11/2019.      IMPRESSION:    The lungs are clear.  Heart size cannot be accurately assessed in this projection.  No acute bone abnormality.              GOVIND SIMMONS M.D., RESIDENT RADIOLOGIST  This document has been electronically signed.  CHELSEY SMITH M.D., ATTENDING RADIOLOGIST  This document has been electronically signed. Aug  2 2020  9:05AM              < end of copied text >            RECENT CULTURES:  08-31 @ 22:06 .Abscess foot                Rare Coag Negative Staphylococcus "Susceptibilities not performed"  Few Corynebacterium species "Susceptibilities not performed"          RESPIRATORY CULTURES:          Studies  Chest X-RAY  CT SCAN Chest   Venous Dopplers: LE:   CT Abdomen  Others

## 2020-09-04 NOTE — PROGRESS NOTE ADULT - ASSESSMENT
60F w COPD on 3LNC (?pending transplant list at [facility]), former smoker, CAD s/p stent (2016), afib on eliquis, uncontrolled DM2, raynaud phenomenon and recent hospitalization at [facility] for altered mental status and AURORA aw COPD exacerbation, LE swelling, weakness.     Prolonged hospitalization.

## 2020-09-04 NOTE — PROGRESS NOTE ADULT - ASSESSMENT
60F w/ end stage COPD on 3LNC (pending transplant list at [facility]), former smoker, CAD s/p stent (2016), afib on eliquis, uncontrolled DM2, raynaud phenomenon and recent hospitalization at [facility] for altered mental status and AUR[facility]A presents to [facility] for evaluation of generalized weakness and difficulty walking admit to medicine for further evaluation.    Copd exacerbation: with hypoxic and hypercarbic resp failure  CAD: s/p stent  a Fibrillation  uncontrolled dm   Raynaud's PHENOMENON  aurora    She is on appropriate rx of copd at this time including theophylline  would check its levels :  Cont BD:   off steroids at this time:  No pulm htn pn ECHO:  She is already on full dose AC for a fibrillation:  She is on bipap at 15 /5: abg NOTED:  hco3 is creeping  up: need to be watched may need diamox: ph is 7.40  can she be transferred to [facility]: she did go there before and was supposed to finish few tests prior to that   She is on IV Bumex:   jere brother and team     9/4:  her hco3 is increasing:   give 250 mg of Diamox today :  this may have to be repeated every day for next few days depending upon her hco3:  she is being agressively diuresed:  and her leg edema is much better:  for now to cont curretn rx:  Her theophylline levels are fine:  JERE PMD

## 2020-09-04 NOTE — PROGRESS NOTE ADULT - SUBJECTIVE AND OBJECTIVE BOX
CARDIOLOGY FOLLOW UP - Dr. Brunson    CC c/o pleuretic chest discomfort 2/2 to work of breathing  unchanged SOB/NIELSON requiring intermittent bipap throughout the day   On IV bumex, diuresing well, LE edema improving     PHYSICAL EXAM:  T(C): 36.6 (09-03-20 @ 22:40), Max: 36.7 (09-03-20 @ 15:50)  HR: 90 (09-03-20 @ 23:33) (88 - 90)  BP: 142/69 (09-03-20 @ 22:40) (135/73 - 142/69)  RR: 18 (09-03-20 @ 22:40) (18 - 19)  SpO2: 99% (09-03-20 @ 23:33) (97% - 100%)  Wt(kg): --  I&O's Summary    03 Sep 2020 07:01  -  04 Sep 2020 07:00  --------------------------------------------------------  IN: 420 mL / OUT: 2500 mL / NET: -2080 mL    04 Sep 2020 07:01  -  04 Sep 2020 10:55  --------------------------------------------------------  IN: 240 mL / OUT: 500 mL / NET: -260 mL        Appearance: Normal	  Cardiovascular: Normal S1 S2,RRR, No JVD, No murmurs  Respiratory: diminished   Gastrointestinal:  Soft, Non-tender, + BS	  Extremities: Normal range of motion, No clubbing, b/l le edema improving, +2, dsg C/D/I         MEDICATIONS  (STANDING):  acetaminophen  IVPB .. 1000 milliGRAM(s) IV Intermittent once  albuterol/ipratropium for Nebulization 3 milliLiter(s) Nebulizer every 6 hours  apixaban 5 milliGRAM(s) Oral every 12 hours  ascorbic acid 500 milliGRAM(s) Oral daily  aspirin enteric coated 81 milliGRAM(s) Oral daily  atorvastatin 80 milliGRAM(s) Oral at bedtime  azithromycin   Tablet 250 milliGRAM(s) Oral <User Schedule>  Biotene Dry Mouth Oral Rinse 5 milliLiter(s) Swish and Spit two times a day  bisacodyl Suppository 10 milliGRAM(s) Rectal daily  budesonide 160 MICROgram(s)/formoterol 4.5 MICROgram(s) Inhaler 2 Puff(s) Inhalation two times a day  buMETAnide Infusion 0.5 mG/Hr (2.5 mL/Hr) IV Continuous <Continuous>  ceFAZolin   IVPB      ceFAZolin   IVPB 1000 milliGRAM(s) IV Intermittent every 8 hours  chlorhexidine 2% Cloths 1 Application(s) Topical daily  cholecalciferol 2000 Unit(s) Oral daily  dextrose 5%. 1000 milliLiter(s) (50 mL/Hr) IV Continuous <Continuous>  dextrose 50% Injectable 25 Gram(s) IV Push once  diltiazem    milliGRAM(s) Oral daily  ferrous    sulfate 325 milliGRAM(s) Oral two times a day  insulin glargine Injectable (LANTUS) 16 Unit(s) SubCutaneous at bedtime  insulin lispro (HumaLOG) corrective regimen sliding scale   SubCutaneous three times a day before meals  insulin lispro (HumaLOG) corrective regimen sliding scale   SubCutaneous at bedtime  insulin lispro Injectable (HumaLOG) 4 Unit(s) SubCutaneous three times a day with meals  lactulose Syrup 15 Gram(s) Oral two times a day  montelukast 10 milliGRAM(s) Oral daily  multivitamin 1 Tablet(s) Oral daily  mupirocin 2% Ointment 1 Application(s) Topical <User Schedule>  pantoprazole    Tablet 40 milliGRAM(s) Oral before breakfast  polyethylene glycol 3350 17 Gram(s) Oral daily  senna 2 Tablet(s) Oral at bedtime  theophylline ER (24 Hour) 400 milliGRAM(s) Oral daily  tiotropium 18 MICROgram(s) Capsule 1 Capsule(s) Inhalation daily      TELEMETRY: 	    ECG:  	  RADIOLOGY:   DIAGNOSTIC TESTING:  [ ] Echocardiogram:  [ ]  Catheterization:  [ ] Stress Test:    OTHER: 	    LABS:	 	                                8.5    7.79  )-----------( 344      ( 04 Sep 2020 09:37 )             29.4     09-04    141  |  89<L>  |  18  ----------------------------<  90  3.5   |  45<HH>  |  1.09    Ca    9.7      04 Sep 2020 09:37  Phos  4.7     09-04  Mg     1.8     09-04

## 2020-09-04 NOTE — PROGRESS NOTE ADULT - ATTENDING COMMENTS
Goal is to diurese and then transfer to [facility]. Patient agreeable with this plan. Goal is to diurese and then transfer to [facility]. Patient agreeable with this plan.    Addendum: bicarb discussed with Dr. Coyle, will give diamox one dose 250mg IV today. Monitor Bicarb closely throughout the weekend.

## 2020-09-04 NOTE — PROGRESS NOTE ADULT - ASSESSMENT
EVAN 1/7/19: no ALINA thrombus, unable to assess LV sys fx  echo 12/7/18: EF 71%, nl LV sys fx, mild diastolic dysfx     a/p    60 year old female with hx of D CHF, HTN, CAD s/p PCI, Afib/ aflutter, s/p Aflutter Ablation 12/2019, COPD, DM2, Anxiety, , raynaud phenomenon presenting with weakness, SOB , hyperkalemia.      1. Dyspnea, Resp failure  -secondary to chronic lung disease, COPD, volume overload  -pulm f/u   -covid 19 negative  -no acs , cv stable   -ecg with known RBBB, new twi noted, hyperkalemia also noted on admission  -echo with normal LVEF, mild diastolic dysfunction   -s/p PO Prednisone, on bumex gtt   -on Duoneb, Symbicort, Spiriva and Theophylline.    2. CAD s/p PCI   -stable, no cp  -c/w ASA, statin  -echo noted above     3. Afib/aflutter s/p  Aflutter Ablation 12/2019  -in NSR, cw cardizem  mg daily  -CHADsVac=2, c/w eliquis     4. Acute on Chronic Diastolic CHF   -dyspnea mostly secondary to copd  -intermittently on bipap   -echo with normal lVEF   -cont bumex gtt  -closely monitor bicarb, Cr stable, CO2 stable     dvt ppx

## 2020-09-04 NOTE — PROGRESS NOTE ADULT - PROBLEM SELECTOR PLAN 2
-c/w bumex gtt at 0.5mg/hr, urine output overnight remains steady   -monitor BMP, Mg, Phos Q12 hrs and replete as needed   -Cr stable but CO2 uptrending  -strict ins and outs  -c/w diuresis as per cardiology   -LE edema is improving  -check ABG, if CO2 increasing, diamox as per pulm

## 2020-09-04 NOTE — PROGRESS NOTE ADULT - ASSESSMENT
--Left dm foot bullae secondary to fluid retention    --erythema decreased at this time to the left foot, erythema not extending past bulla area, possible cellulitis, adaptic touch and mupirocin applied  --wound culture reviewed, growing skin richie   --erythema unlikely to be due to cellulitis  --recommend continued daily mupirocin with adaptic and dsd left forefoot daily  --ACE compression bilat if tolerated by patient  --recommend continue podiatric footcare as outpatient with current dpm  --will monitor during this admission

## 2020-09-04 NOTE — PROGRESS NOTE ADULT - SUBJECTIVE AND OBJECTIVE BOX
Patient is a 60y old  Female who presents with a chief complaint of 60F p/w generalized weakness and difficulty walking (04 Sep 2020 14:30)       INTERVAL HPI/OVERNIGHT EVENTS:  Patient seen and evaluated at bedside.  Pt is resting comfortable in NAD. Denies N/V/F/C. Pain in feet significantly decreased.    Allergies    No Known Allergies    Intolerances    albuterol (Unknown)      Vital Signs Last 24 Hrs  T(C): 36.7 (04 Sep 2020 16:37), Max: 36.7 (04 Sep 2020 13:32)  T(F): 98.1 (04 Sep 2020 16:37), Max: 98.1 (04 Sep 2020 16:37)  HR: 82 (04 Sep 2020 16:37) (60 - 90)  BP: 156/81 (04 Sep 2020 16:37) (125/65 - 156/81)  BP(mean): --  RR: 19 (04 Sep 2020 16:37) (18 - 20)  SpO2: 95% (04 Sep 2020 16:37) (95% - 100%)    LABS:                        8.5    7.79  )-----------( 344      ( 04 Sep 2020 09:37 )             29.4     09-04    141  |  89<L>  |  18  ----------------------------<  90  3.5   |  45<HH>  |  1.09    Ca    9.7      04 Sep 2020 09:37  Phos  4.7     09-04  Mg     1.8     09-04          CAPILLARY BLOOD GLUCOSE      POCT Blood Glucose.: 188 mg/dL (04 Sep 2020 18:58)  POCT Blood Glucose.: 115 mg/dL (04 Sep 2020 14:48)  POCT Blood Glucose.: 77 mg/dL (04 Sep 2020 14:24)  POCT Blood Glucose.: 44 mg/dL (04 Sep 2020 14:04)  POCT Blood Glucose.: 40 mg/dL (04 Sep 2020 14:03)  POCT Blood Glucose.: 98 mg/dL (04 Sep 2020 09:36)  POCT Blood Glucose.: 104 mg/dL (03 Sep 2020 21:44)      Lower Extremity Physical Exam:  Vascular: DP/PT n/p, B/L with known h/o raynauds, CFT <_5 seconds B/L, Temperature gradient _warm to cool, B/L.   Neuro: Epicritic sensation intact to the level of _toes, B/L.  Skin: Postive mycotic toenails x10  Wound #1:   Location: dorsum left forefoot  Size: 6cm diameter  Depth: superficial  Wound bed: bullae  Drainage:  clear serous fluid/fluctuant  Odor: none  Periwound: no clinical signs of infection  Etiology: bullae/dm  Erythema increased to the left foot	     Location: dorsum right forefoot  Size: 5cm diameter  Depth: superficial  Wound bed: bullae  Drainage:  clear serous fluid/fluctuant  Odor: none  Periwound: no clinical signs of infection  Etiology: bullae/dm

## 2020-09-05 DIAGNOSIS — J96.91 RESPIRATORY FAILURE, UNSPECIFIED WITH HYPOXIA: ICD-10-CM

## 2020-09-05 DIAGNOSIS — L03.90 CELLULITIS, UNSPECIFIED: ICD-10-CM

## 2020-09-05 DIAGNOSIS — I50.33 ACUTE ON CHRONIC DIASTOLIC (CONGESTIVE) HEART FAILURE: ICD-10-CM

## 2020-09-05 LAB
ANION GAP SERPL CALC-SCNC: 8 MMOL/L — SIGNIFICANT CHANGE UP (ref 5–17)
BASE EXCESS BLDA CALC-SCNC: 17 MMOL/L — HIGH (ref -2–2)
BUN SERPL-MCNC: 26 MG/DL — HIGH (ref 7–23)
CALCIUM SERPL-MCNC: 9.7 MG/DL — SIGNIFICANT CHANGE UP (ref 8.4–10.5)
CHLORIDE SERPL-SCNC: 87 MMOL/L — LOW (ref 96–108)
CO2 BLDA-SCNC: 46 MMOL/L — HIGH (ref 22–30)
CO2 SERPL-SCNC: 44 MMOL/L — HIGH (ref 22–31)
CREAT SERPL-MCNC: 1.2 MG/DL — SIGNIFICANT CHANGE UP (ref 0.5–1.3)
CULTURE RESULTS: SIGNIFICANT CHANGE UP
GLUCOSE BLDC GLUCOMTR-MCNC: 106 MG/DL — HIGH (ref 70–99)
GLUCOSE BLDC GLUCOMTR-MCNC: 138 MG/DL — HIGH (ref 70–99)
GLUCOSE BLDC GLUCOMTR-MCNC: 155 MG/DL — HIGH (ref 70–99)
GLUCOSE BLDC GLUCOMTR-MCNC: 156 MG/DL — HIGH (ref 70–99)
GLUCOSE BLDC GLUCOMTR-MCNC: 59 MG/DL — LOW (ref 70–99)
GLUCOSE SERPL-MCNC: 148 MG/DL — HIGH (ref 70–99)
HCO3 BLDA-SCNC: 44 MMOL/L — HIGH (ref 21–29)
HCT VFR BLD CALC: 30.9 % — LOW (ref 34.5–45)
HGB BLD-MCNC: 9 G/DL — LOW (ref 11.5–15.5)
HOROWITZ INDEX BLDA+IHG-RTO: 35 — SIGNIFICANT CHANGE UP
MAGNESIUM SERPL-MCNC: 1.9 MG/DL — SIGNIFICANT CHANGE UP (ref 1.6–2.6)
MCHC RBC-ENTMCNC: 25 PG — LOW (ref 27–34)
MCHC RBC-ENTMCNC: 29.1 GM/DL — LOW (ref 32–36)
MCV RBC AUTO: 85.8 FL — SIGNIFICANT CHANGE UP (ref 80–100)
NRBC # BLD: 0 /100 WBCS — SIGNIFICANT CHANGE UP (ref 0–0)
PCO2 BLDA: 72 MMHG — CRITICAL HIGH (ref 32–46)
PH BLDA: 7.41 — SIGNIFICANT CHANGE UP (ref 7.35–7.45)
PLATELET # BLD AUTO: 403 K/UL — HIGH (ref 150–400)
PO2 BLDA: 103 MMHG — SIGNIFICANT CHANGE UP (ref 74–108)
POTASSIUM SERPL-MCNC: 3.6 MMOL/L — SIGNIFICANT CHANGE UP (ref 3.5–5.3)
POTASSIUM SERPL-SCNC: 3.6 MMOL/L — SIGNIFICANT CHANGE UP (ref 3.5–5.3)
RBC # BLD: 3.6 M/UL — LOW (ref 3.8–5.2)
RBC # FLD: 19.1 % — HIGH (ref 10.3–14.5)
SAO2 % BLDA: 97 % — HIGH (ref 92–96)
SODIUM SERPL-SCNC: 139 MMOL/L — SIGNIFICANT CHANGE UP (ref 135–145)
SPECIMEN SOURCE: SIGNIFICANT CHANGE UP
WBC # BLD: 8.54 K/UL — SIGNIFICANT CHANGE UP (ref 3.8–10.5)
WBC # FLD AUTO: 8.54 K/UL — SIGNIFICANT CHANGE UP (ref 3.8–10.5)

## 2020-09-05 PROCEDURE — 99233 SBSQ HOSP IP/OBS HIGH 50: CPT

## 2020-09-05 RX ORDER — INSULIN GLARGINE 100 [IU]/ML
8 INJECTION, SOLUTION SUBCUTANEOUS ONCE
Refills: 0 | Status: COMPLETED | OUTPATIENT
Start: 2020-09-05 | End: 2020-09-05

## 2020-09-05 RX ORDER — ALPRAZOLAM 0.25 MG
0.25 TABLET ORAL ONCE
Refills: 0 | Status: DISCONTINUED | OUTPATIENT
Start: 2020-09-05 | End: 2020-09-05

## 2020-09-05 RX ADMIN — Medication 5 MILLILITER(S): at 06:32

## 2020-09-05 RX ADMIN — Medication 325 MILLIGRAM(S): at 18:18

## 2020-09-05 RX ADMIN — Medication 100 MILLIGRAM(S): at 06:32

## 2020-09-05 RX ADMIN — TIOTROPIUM BROMIDE 1 CAPSULE(S): 18 CAPSULE ORAL; RESPIRATORY (INHALATION) at 12:26

## 2020-09-05 RX ADMIN — Medication 4 UNIT(S): at 09:20

## 2020-09-05 RX ADMIN — Medication 3 MILLILITER(S): at 18:16

## 2020-09-05 RX ADMIN — Medication 100 MILLIGRAM(S): at 13:58

## 2020-09-05 RX ADMIN — Medication 180 MILLIGRAM(S): at 06:31

## 2020-09-05 RX ADMIN — Medication 1: at 18:15

## 2020-09-05 RX ADMIN — POLYETHYLENE GLYCOL 3350 17 GRAM(S): 17 POWDER, FOR SOLUTION ORAL at 12:24

## 2020-09-05 RX ADMIN — Medication 3 MILLILITER(S): at 06:32

## 2020-09-05 RX ADMIN — OXYCODONE HYDROCHLORIDE 5 MILLIGRAM(S): 5 TABLET ORAL at 23:53

## 2020-09-05 RX ADMIN — Medication 2000 UNIT(S): at 12:27

## 2020-09-05 RX ADMIN — ATORVASTATIN CALCIUM 80 MILLIGRAM(S): 80 TABLET, FILM COATED ORAL at 22:08

## 2020-09-05 RX ADMIN — BUDESONIDE AND FORMOTEROL FUMARATE DIHYDRATE 2 PUFF(S): 160; 4.5 AEROSOL RESPIRATORY (INHALATION) at 18:56

## 2020-09-05 RX ADMIN — CHLORHEXIDINE GLUCONATE 1 APPLICATION(S): 213 SOLUTION TOPICAL at 12:30

## 2020-09-05 RX ADMIN — INSULIN GLARGINE 8 UNIT(S): 100 INJECTION, SOLUTION SUBCUTANEOUS at 22:15

## 2020-09-05 RX ADMIN — Medication 100 MILLIGRAM(S): at 22:09

## 2020-09-05 RX ADMIN — BUDESONIDE AND FORMOTEROL FUMARATE DIHYDRATE 2 PUFF(S): 160; 4.5 AEROSOL RESPIRATORY (INHALATION) at 06:31

## 2020-09-05 RX ADMIN — MONTELUKAST 10 MILLIGRAM(S): 4 TABLET, CHEWABLE ORAL at 12:27

## 2020-09-05 RX ADMIN — Medication 81 MILLIGRAM(S): at 12:26

## 2020-09-05 RX ADMIN — Medication 325 MILLIGRAM(S): at 06:31

## 2020-09-05 RX ADMIN — Medication 1 TABLET(S): at 12:25

## 2020-09-05 RX ADMIN — SENNA PLUS 2 TABLET(S): 8.6 TABLET ORAL at 22:08

## 2020-09-05 RX ADMIN — PANTOPRAZOLE SODIUM 40 MILLIGRAM(S): 20 TABLET, DELAYED RELEASE ORAL at 06:31

## 2020-09-05 RX ADMIN — Medication 4 UNIT(S): at 18:16

## 2020-09-05 RX ADMIN — MUPIROCIN 1 APPLICATION(S): 20 OINTMENT TOPICAL at 09:21

## 2020-09-05 RX ADMIN — Medication 3 MILLILITER(S): at 12:25

## 2020-09-05 RX ADMIN — APIXABAN 5 MILLIGRAM(S): 2.5 TABLET, FILM COATED ORAL at 18:17

## 2020-09-05 RX ADMIN — Medication 3 MILLILITER(S): at 22:08

## 2020-09-05 RX ADMIN — Medication 500 MILLIGRAM(S): at 12:27

## 2020-09-05 RX ADMIN — Medication 0.25 MILLIGRAM(S): at 22:08

## 2020-09-05 RX ADMIN — Medication 1: at 09:20

## 2020-09-05 RX ADMIN — Medication 5 MILLILITER(S): at 18:17

## 2020-09-05 RX ADMIN — Medication 400 MILLIGRAM(S): at 12:30

## 2020-09-05 RX ADMIN — OXYCODONE HYDROCHLORIDE 5 MILLIGRAM(S): 5 TABLET ORAL at 22:09

## 2020-09-05 RX ADMIN — APIXABAN 5 MILLIGRAM(S): 2.5 TABLET, FILM COATED ORAL at 06:31

## 2020-09-05 NOTE — PROGRESS NOTE ADULT - PROBLEM SELECTOR PLAN 3
-Bumex gtt per cards  -LE edema improving  -S/p 1 dose of diamox 250mg yesterday. Serum CO2 still elevated today. Check ABG prior to further doses of diamox. -Bumex gtt per cards  -LE edema improving  -S/p 1 dose of diamox 250mg yesterday. Serum CO2 still elevated today.

## 2020-09-05 NOTE — PROGRESS NOTE ADULT - ASSESSMENT
EVAN 1/7/19: no ALINA thrombus, unable to assess LV sys fx  echo 12/7/18: EF 71%, nl LV sys fx, mild diastolic dysfx     a/p    60 year old female with hx of D CHF, HTN, CAD s/p PCI, Afib/ aflutter, s/p Aflutter Ablation 12/2019, COPD, DM2, Anxiety, , raynaud phenomenon presenting with weakness, SOB , hyperkalemia.        1. 4. Acute on Chronic Diastolic CHF   -volume status improving  -cont bumex gtt as tolerated  -close monitoring of electrolytes.renal function and C02  -dyspnea mostly secondary to copd  -intermittently on bipap   -echo with normal lVEF     2. Dyspnea, Resp failure  -secondary to chronic lung disease, COPD, volume overload  -pulm f/u   -covid 19 negative  -no acs , cv stable   -ecg with known RBBB, new twi noted, hyperkalemia also noted on admission  -echo with normal LVEF, mild diastolic dysfunction   -s/p PO Prednisone, on bumex gtt   -on Duoneb, Symbicort, Spiriva and Theophylline.    3. CAD s/p PCI   -stable, no cp  -c/w ASA, statin  -echo noted above     4. Afib/aflutter s/p  Aflutter Ablation 12/2019  -in NSR, cw cardizem  mg daily  -CHADsVac=2, c/w eliquis           dvt ppx

## 2020-09-05 NOTE — PROGRESS NOTE ADULT - PROBLEM SELECTOR PLAN 2
- C/w bumex gtt at 0.5mg/hr, urine output overnight remains steady   - Monitor BMP, Mg, Phos Q12 hrs and replete as needed   - Cr stable and so is CO2. Received Diamox yesterday.   - Strict ins and outs  - C/w diuresis as per cardiology   - LE edema is improving

## 2020-09-05 NOTE — PROGRESS NOTE ADULT - PROBLEM SELECTOR PLAN 1
- Dyspnea multifactorial in the setting of underlying lung disease, cardiovascular   dysfunction, obesity, and deconditioning.   - Was given prolonged course of Prednisone, now tapered off. Overnight and prn   Bipap. Pt comfortable off Bipap and able to speak in full sentences.   - cont azithro, duonebs, spiriva, symbicort, singulair  - Continue supplemental O2 with goal O2 sat > 88% - she does not need to be at   100%.  - Theophiline at 3.2.  -as per discussion with Dr. Mathew, pt was getting evaluated at [facility] by Dr. Cervantes for lung transplant. However she is NOT listed currently to receive a transplant.

## 2020-09-05 NOTE — PROGRESS NOTE ADULT - SUBJECTIVE AND OBJECTIVE BOX
CC: in good spirits today, breathing better, edema improved, 3.1 l neg today  c/o leg cramping  TELEMETRY:     PHYSICAL EXAM:    T(C): 36.7 (09-05-20 @ 08:20), Max: 36.8 (09-04-20 @ 23:05)  HR: 86 (09-05-20 @ 09:42) (60 - 95)  BP: 123/75 (09-05-20 @ 08:20) (123/75 - 156/81)  RR: 20 (09-05-20 @ 08:20) (19 - 20)  SpO2: 95% (09-05-20 @ 09:42) (95% - 100%)  Wt(kg): --  I&O's Summary    04 Sep 2020 07:01  -  05 Sep 2020 07:00  --------------------------------------------------------  IN: 940 mL / OUT: 4090 mL / NET: -3150 mL        Appearance: Normal	  Cardiovascular: Normal S1 S2,RRR, No JVD, No murmurs  Respiratory: Lungs clear to auscultation	  Gastrointestinal:  Soft, Non-tender, + BS	  Extremities: Normal range of motion, No clubbing, cyanosis or edema  Vascular: Peripheral pulses palpable 2+ bilaterally     LABS:	 	                          9.0    8.54  )-----------( 403      ( 05 Sep 2020 09:27 )             30.9     09-04    141  |  89<L>  |  18  ----------------------------<  90  3.5   |  45<HH>  |  1.09    Ca    9.7      04 Sep 2020 09:37  Phos  4.7     09-04  Mg     1.8     09-04            CARDIAC MARKERS:

## 2020-09-05 NOTE — PROGRESS NOTE ADULT - ASSESSMENT
60F w/ end stage COPD on 3LNC (pending transplant list at [facility]), former smoker, CAD s/p stent (2016), afib on eliquis, uncontrolled DM2, raynaud phenomenon and recent hospitalization at [facility] for altered mental status and AUR[facility]A presents to [facility] for evaluation of generalized weakness and difficulty walking admit to medicine for further evaluation. Found to have acute on diastolic HF, COPD exacerbation.

## 2020-09-05 NOTE — PROGRESS NOTE ADULT - ASSESSMENT
60F w COPD on 3LNC (?pending transplant list at [facility]), former smoker, CAD s/p stent (2016), afib on eliquis, uncontrolled DM2, raynaud phenomenon and recent hospitalization at [facility] for altered mental status and AURORA aw COPD exacerbation, LE swelling, weakness.     Prolonged hospitalization.

## 2020-09-05 NOTE — PROGRESS NOTE ADULT - PROBLEM SELECTOR PLAN 5
- W erythema. C/w cefazolin for possible cellulitis, day 6/7 today   - Pt feels b/l foot swelling is getting worse, podiatry re-evaluated 8/31  - Continue with daily mupirocin with adaptic and dsd to left forefoot daily, ACE   compression b/l if pt tolerates

## 2020-09-05 NOTE — PROGRESS NOTE ADULT - SUBJECTIVE AND OBJECTIVE BOX
Patient is a 60y old  Female who presents with a chief complaint of 60F p/w generalized weakness and difficulty walking (05 Sep 2020 09:55)      SUBJECTIVE / OVERNIGHT EVENTS:  Feels better today. Diuresing well.   Remains on Bumex infusion. Sitting in bed, eating breakfast.     MEDICATIONS  (STANDING):  acetaminophen  IVPB .. 1000 milliGRAM(s) IV Intermittent once  albuterol/ipratropium for Nebulization 3 milliLiter(s) Nebulizer every 6 hours  apixaban 5 milliGRAM(s) Oral every 12 hours  ascorbic acid 500 milliGRAM(s) Oral daily  aspirin enteric coated 81 milliGRAM(s) Oral daily  atorvastatin 80 milliGRAM(s) Oral at bedtime  azithromycin   Tablet 250 milliGRAM(s) Oral <User Schedule>  Biotene Dry Mouth Oral Rinse 5 milliLiter(s) Swish and Spit two times a day  bisacodyl Suppository 10 milliGRAM(s) Rectal daily  budesonide 160 MICROgram(s)/formoterol 4.5 MICROgram(s) Inhaler 2 Puff(s) Inhalation two times a day  buMETAnide Infusion 0.5 mG/Hr (2.5 mL/Hr) IV Continuous <Continuous>  ceFAZolin   IVPB      ceFAZolin   IVPB 1000 milliGRAM(s) IV Intermittent every 8 hours  chlorhexidine 2% Cloths 1 Application(s) Topical daily  cholecalciferol 2000 Unit(s) Oral daily  dextrose 5%. 1000 milliLiter(s) (50 mL/Hr) IV Continuous <Continuous>  dextrose 50% Injectable 25 Gram(s) IV Push once  diltiazem    milliGRAM(s) Oral daily  ferrous    sulfate 325 milliGRAM(s) Oral two times a day  insulin glargine Injectable (LANTUS) 16 Unit(s) SubCutaneous at bedtime  insulin lispro (HumaLOG) corrective regimen sliding scale   SubCutaneous three times a day before meals  insulin lispro (HumaLOG) corrective regimen sliding scale   SubCutaneous at bedtime  insulin lispro Injectable (HumaLOG) 4 Unit(s) SubCutaneous three times a day with meals  lactulose Syrup 15 Gram(s) Oral two times a day  montelukast 10 milliGRAM(s) Oral daily  multivitamin 1 Tablet(s) Oral daily  mupirocin 2% Ointment 1 Application(s) Topical <User Schedule>  pantoprazole    Tablet 40 milliGRAM(s) Oral before breakfast  polyethylene glycol 3350 17 Gram(s) Oral daily  senna 2 Tablet(s) Oral at bedtime  theophylline ER (24 Hour) 400 milliGRAM(s) Oral daily  tiotropium 18 MICROgram(s) Capsule 1 Capsule(s) Inhalation daily    MEDICATIONS  (PRN):  glucagon  Injectable 1 milliGRAM(s) IntraMuscular once PRN Glucose LESS THAN 70 milligrams/deciliter  guaiFENesin   Syrup  (Sugar-Free) 100 milliGRAM(s) Oral every 6 hours PRN Cough  oxyCODONE    IR 5 milliGRAM(s) Oral every 6 hours PRN Severe Pain (7 - 10)      CAPILLARY BLOOD GLUCOSE      POCT Blood Glucose.: 156 mg/dL (05 Sep 2020 09:10)  POCT Blood Glucose.: 193 mg/dL (04 Sep 2020 22:59)  POCT Blood Glucose.: 114 mg/dL (04 Sep 2020 21:49)  POCT Blood Glucose.: 188 mg/dL (04 Sep 2020 18:58)  POCT Blood Glucose.: 115 mg/dL (04 Sep 2020 14:48)  POCT Blood Glucose.: 77 mg/dL (04 Sep 2020 14:24)  POCT Blood Glucose.: 44 mg/dL (04 Sep 2020 14:04)  POCT Blood Glucose.: 40 mg/dL (04 Sep 2020 14:03)    I&O's Summary    04 Sep 2020 07:01  -  05 Sep 2020 07:00  --------------------------------------------------------  IN: 940 mL / OUT: 4090 mL / NET: -3150 mL        PHYSICAL EXAM:  Vital Signs Last 24 Hrs  T(C): 36.7 (05 Sep 2020 08:20), Max: 36.8 (04 Sep 2020 23:05)  T(F): 98.1 (05 Sep 2020 08:20), Max: 98.2 (04 Sep 2020 23:05)  HR: 86 (05 Sep 2020 09:42) (60 - 95)  BP: 123/75 (05 Sep 2020 08:20) (123/75 - 156/81)  BP(mean): --  RR: 20 (05 Sep 2020 08:20) (19 - 20)  SpO2: 95% (05 Sep 2020 09:42) (95% - 100%)  GENERAL: NAD, well-developed  HEAD:  Atraumatic, Normocephalic  EYES: EOMI, PERRLA, conjunctiva and sclera clear  NECK: Supple, No JVD  CHEST/LUNG: Clear to auscultation bilaterally; No wheeze  HEART: Regular rate and rhythm; No murmurs, rubs, or gallops  ABDOMEN: Soft, Nontender, Nondistended; Bowel sounds present  EXTREMITIES:  1+ edema  PSYCH: AAOx3  NEUROLOGY: non-focal  SKIN: No rashes or lesions    LABS:                        9.0    8.54  )-----------( 403      ( 05 Sep 2020 09:27 )             30.9     09-05    139  |  87<L>  |  26<H>  ----------------------------<  148<H>  3.6   |  44<H>  |  1.20    Ca    9.7      05 Sep 2020 09:27  Phos  4.7     09-04  Mg     1.9     09-05                RADIOLOGY & ADDITIONAL TESTS:    Imaging Personally Reviewed:    Consultant(s) Notes Reviewed:      Care Discussed with Consultants/Other Providers:

## 2020-09-05 NOTE — PROGRESS NOTE ADULT - PROBLEM SELECTOR PLAN 6
- Hb slowly drifting down since admission, likely exacerbated by prolonged   hospitalization and frequent blood draws.   - Low serum iron.   - Transfused 8/29 w improvement.

## 2020-09-05 NOTE — PROGRESS NOTE ADULT - SUBJECTIVE AND OBJECTIVE BOX
Follow-up Pulm Progress Note    Feels okay  Dyspnea stable  Sats 96% 4LNC  On/off bipap throughout day    Medications:  MEDICATIONS  (STANDING):  acetaminophen  IVPB .. 1000 milliGRAM(s) IV Intermittent once  albuterol/ipratropium for Nebulization 3 milliLiter(s) Nebulizer every 6 hours  apixaban 5 milliGRAM(s) Oral every 12 hours  ascorbic acid 500 milliGRAM(s) Oral daily  aspirin enteric coated 81 milliGRAM(s) Oral daily  atorvastatin 80 milliGRAM(s) Oral at bedtime  azithromycin   Tablet 250 milliGRAM(s) Oral <User Schedule>  Biotene Dry Mouth Oral Rinse 5 milliLiter(s) Swish and Spit two times a day  bisacodyl Suppository 10 milliGRAM(s) Rectal daily  budesonide 160 MICROgram(s)/formoterol 4.5 MICROgram(s) Inhaler 2 Puff(s) Inhalation two times a day  buMETAnide Infusion 0.5 mG/Hr (2.5 mL/Hr) IV Continuous <Continuous>  ceFAZolin   IVPB      ceFAZolin   IVPB 1000 milliGRAM(s) IV Intermittent every 8 hours  chlorhexidine 2% Cloths 1 Application(s) Topical daily  cholecalciferol 2000 Unit(s) Oral daily  dextrose 5%. 1000 milliLiter(s) (50 mL/Hr) IV Continuous <Continuous>  dextrose 50% Injectable 25 Gram(s) IV Push once  diltiazem    milliGRAM(s) Oral daily  ferrous    sulfate 325 milliGRAM(s) Oral two times a day  insulin glargine Injectable (LANTUS) 16 Unit(s) SubCutaneous at bedtime  insulin lispro (HumaLOG) corrective regimen sliding scale   SubCutaneous three times a day before meals  insulin lispro (HumaLOG) corrective regimen sliding scale   SubCutaneous at bedtime  insulin lispro Injectable (HumaLOG) 4 Unit(s) SubCutaneous three times a day with meals  lactulose Syrup 15 Gram(s) Oral two times a day  montelukast 10 milliGRAM(s) Oral daily  multivitamin 1 Tablet(s) Oral daily  mupirocin 2% Ointment 1 Application(s) Topical <User Schedule>  pantoprazole    Tablet 40 milliGRAM(s) Oral before breakfast  polyethylene glycol 3350 17 Gram(s) Oral daily  senna 2 Tablet(s) Oral at bedtime  theophylline ER (24 Hour) 400 milliGRAM(s) Oral daily  tiotropium 18 MICROgram(s) Capsule 1 Capsule(s) Inhalation daily    MEDICATIONS  (PRN):  glucagon  Injectable 1 milliGRAM(s) IntraMuscular once PRN Glucose LESS THAN 70 milligrams/deciliter  guaiFENesin   Syrup  (Sugar-Free) 100 milliGRAM(s) Oral every 6 hours PRN Cough  oxyCODONE    IR 5 milliGRAM(s) Oral every 6 hours PRN Severe Pain (7 - 10)          Vital Signs Last 24 Hrs  T(C): 36.7 (05 Sep 2020 08:20), Max: 36.8 (04 Sep 2020 23:05)  T(F): 98.1 (05 Sep 2020 08:20), Max: 98.2 (04 Sep 2020 23:05)  HR: 86 (05 Sep 2020 09:42) (60 - 95)  BP: 123/75 (05 Sep 2020 08:20) (123/75 - 156/81)  BP(mean): --  RR: 20 (05 Sep 2020 08:20) (19 - 20)  SpO2: 95% (05 Sep 2020 09:42) (95% - 100%)          09-04 @ 07:01  -  09-05 @ 07:00  --------------------------------------------------------  IN: 940 mL / OUT: 4090 mL / NET: -3150 mL          LABS:                        9.0    8.54  )-----------( 403      ( 05 Sep 2020 09:27 )             30.9     09-05    139  |  87<L>  |  26<H>  ----------------------------<  148<H>  3.6   |  44<H>  |  1.20    Ca    9.7      05 Sep 2020 09:27  Phos  4.7     09-04  Mg     1.9     09-05            CAPILLARY BLOOD GLUCOSE      POCT Blood Glucose.: 156 mg/dL (05 Sep 2020 09:10)        Physical Examination:  PULM: diminished BS throughout   CVS: RRR    RADIOLOGY REVIEWED  CXR 8/1: grossly clear Follow-up Pulm Progress Note    Covering Dr. Coyle    Feels okay  Dyspnea stable  Sats 96% 4LNC  On/off bipap throughout day    Medications:  MEDICATIONS  (STANDING):  acetaminophen  IVPB .. 1000 milliGRAM(s) IV Intermittent once  albuterol/ipratropium for Nebulization 3 milliLiter(s) Nebulizer every 6 hours  apixaban 5 milliGRAM(s) Oral every 12 hours  ascorbic acid 500 milliGRAM(s) Oral daily  aspirin enteric coated 81 milliGRAM(s) Oral daily  atorvastatin 80 milliGRAM(s) Oral at bedtime  azithromycin   Tablet 250 milliGRAM(s) Oral <User Schedule>  Biotene Dry Mouth Oral Rinse 5 milliLiter(s) Swish and Spit two times a day  bisacodyl Suppository 10 milliGRAM(s) Rectal daily  budesonide 160 MICROgram(s)/formoterol 4.5 MICROgram(s) Inhaler 2 Puff(s) Inhalation two times a day  buMETAnide Infusion 0.5 mG/Hr (2.5 mL/Hr) IV Continuous <Continuous>  ceFAZolin   IVPB      ceFAZolin   IVPB 1000 milliGRAM(s) IV Intermittent every 8 hours  chlorhexidine 2% Cloths 1 Application(s) Topical daily  cholecalciferol 2000 Unit(s) Oral daily  dextrose 5%. 1000 milliLiter(s) (50 mL/Hr) IV Continuous <Continuous>  dextrose 50% Injectable 25 Gram(s) IV Push once  diltiazem    milliGRAM(s) Oral daily  ferrous    sulfate 325 milliGRAM(s) Oral two times a day  insulin glargine Injectable (LANTUS) 16 Unit(s) SubCutaneous at bedtime  insulin lispro (HumaLOG) corrective regimen sliding scale   SubCutaneous three times a day before meals  insulin lispro (HumaLOG) corrective regimen sliding scale   SubCutaneous at bedtime  insulin lispro Injectable (HumaLOG) 4 Unit(s) SubCutaneous three times a day with meals  lactulose Syrup 15 Gram(s) Oral two times a day  montelukast 10 milliGRAM(s) Oral daily  multivitamin 1 Tablet(s) Oral daily  mupirocin 2% Ointment 1 Application(s) Topical <User Schedule>  pantoprazole    Tablet 40 milliGRAM(s) Oral before breakfast  polyethylene glycol 3350 17 Gram(s) Oral daily  senna 2 Tablet(s) Oral at bedtime  theophylline ER (24 Hour) 400 milliGRAM(s) Oral daily  tiotropium 18 MICROgram(s) Capsule 1 Capsule(s) Inhalation daily    MEDICATIONS  (PRN):  glucagon  Injectable 1 milliGRAM(s) IntraMuscular once PRN Glucose LESS THAN 70 milligrams/deciliter  guaiFENesin   Syrup  (Sugar-Free) 100 milliGRAM(s) Oral every 6 hours PRN Cough  oxyCODONE    IR 5 milliGRAM(s) Oral every 6 hours PRN Severe Pain (7 - 10)          Vital Signs Last 24 Hrs  T(C): 36.7 (05 Sep 2020 08:20), Max: 36.8 (04 Sep 2020 23:05)  T(F): 98.1 (05 Sep 2020 08:20), Max: 98.2 (04 Sep 2020 23:05)  HR: 86 (05 Sep 2020 09:42) (60 - 95)  BP: 123/75 (05 Sep 2020 08:20) (123/75 - 156/81)  BP(mean): --  RR: 20 (05 Sep 2020 08:20) (19 - 20)  SpO2: 95% (05 Sep 2020 09:42) (95% - 100%)          09-04 @ 07:01  -  09-05 @ 07:00  --------------------------------------------------------  IN: 940 mL / OUT: 4090 mL / NET: -3150 mL          LABS:                        9.0    8.54  )-----------( 403      ( 05 Sep 2020 09:27 )             30.9     09-05    139  |  87<L>  |  26<H>  ----------------------------<  148<H>  3.6   |  44<H>  |  1.20    Ca    9.7      05 Sep 2020 09:27  Phos  4.7     09-04  Mg     1.9     09-05            CAPILLARY BLOOD GLUCOSE      POCT Blood Glucose.: 156 mg/dL (05 Sep 2020 09:10)        Physical Examination:  PULM: diminished BS throughout   CVS: RRR    RADIOLOGY REVIEWED  CXR 8/1: grossly clear

## 2020-09-06 LAB
ANION GAP SERPL CALC-SCNC: 10 MMOL/L — SIGNIFICANT CHANGE UP (ref 5–17)
BUN SERPL-MCNC: 24 MG/DL — HIGH (ref 7–23)
CALCIUM SERPL-MCNC: 9.7 MG/DL — SIGNIFICANT CHANGE UP (ref 8.4–10.5)
CHLORIDE SERPL-SCNC: 87 MMOL/L — LOW (ref 96–108)
CO2 SERPL-SCNC: 42 MMOL/L — HIGH (ref 22–31)
CREAT SERPL-MCNC: 1.19 MG/DL — SIGNIFICANT CHANGE UP (ref 0.5–1.3)
GLUCOSE BLDC GLUCOMTR-MCNC: 107 MG/DL — HIGH (ref 70–99)
GLUCOSE BLDC GLUCOMTR-MCNC: 176 MG/DL — HIGH (ref 70–99)
GLUCOSE BLDC GLUCOMTR-MCNC: 178 MG/DL — HIGH (ref 70–99)
GLUCOSE BLDC GLUCOMTR-MCNC: 214 MG/DL — HIGH (ref 70–99)
GLUCOSE SERPL-MCNC: 167 MG/DL — HIGH (ref 70–99)
HCT VFR BLD CALC: 29.1 % — LOW (ref 34.5–45)
HGB BLD-MCNC: 8.3 G/DL — LOW (ref 11.5–15.5)
MAGNESIUM SERPL-MCNC: 2 MG/DL — SIGNIFICANT CHANGE UP (ref 1.6–2.6)
MCHC RBC-ENTMCNC: 24.6 PG — LOW (ref 27–34)
MCHC RBC-ENTMCNC: 28.5 GM/DL — LOW (ref 32–36)
MCV RBC AUTO: 86.4 FL — SIGNIFICANT CHANGE UP (ref 80–100)
NRBC # BLD: 0 /100 WBCS — SIGNIFICANT CHANGE UP (ref 0–0)
PLATELET # BLD AUTO: 428 K/UL — HIGH (ref 150–400)
POTASSIUM SERPL-MCNC: 3.5 MMOL/L — SIGNIFICANT CHANGE UP (ref 3.5–5.3)
POTASSIUM SERPL-SCNC: 3.5 MMOL/L — SIGNIFICANT CHANGE UP (ref 3.5–5.3)
RBC # BLD: 3.37 M/UL — LOW (ref 3.8–5.2)
RBC # FLD: 19.2 % — HIGH (ref 10.3–14.5)
SODIUM SERPL-SCNC: 139 MMOL/L — SIGNIFICANT CHANGE UP (ref 135–145)
WBC # BLD: 8.92 K/UL — SIGNIFICANT CHANGE UP (ref 3.8–10.5)
WBC # FLD AUTO: 8.92 K/UL — SIGNIFICANT CHANGE UP (ref 3.8–10.5)

## 2020-09-06 PROCEDURE — 99233 SBSQ HOSP IP/OBS HIGH 50: CPT

## 2020-09-06 RX ORDER — INSULIN LISPRO 100/ML
2 VIAL (ML) SUBCUTANEOUS
Refills: 0 | Status: DISCONTINUED | OUTPATIENT
Start: 2020-09-06 | End: 2020-09-14

## 2020-09-06 RX ORDER — INSULIN GLARGINE 100 [IU]/ML
12 INJECTION, SOLUTION SUBCUTANEOUS AT BEDTIME
Refills: 0 | Status: DISCONTINUED | OUTPATIENT
Start: 2020-09-06 | End: 2020-09-14

## 2020-09-06 RX ORDER — POTASSIUM CHLORIDE 20 MEQ
40 PACKET (EA) ORAL ONCE
Refills: 0 | Status: COMPLETED | OUTPATIENT
Start: 2020-09-06 | End: 2020-09-06

## 2020-09-06 RX ORDER — POTASSIUM CHLORIDE 20 MEQ
10 PACKET (EA) ORAL ONCE
Refills: 0 | Status: COMPLETED | OUTPATIENT
Start: 2020-09-06 | End: 2020-09-06

## 2020-09-06 RX ORDER — ALPRAZOLAM 0.25 MG
0.25 TABLET ORAL ONCE
Refills: 0 | Status: DISCONTINUED | OUTPATIENT
Start: 2020-09-06 | End: 2020-09-07

## 2020-09-06 RX ADMIN — Medication 325 MILLIGRAM(S): at 18:19

## 2020-09-06 RX ADMIN — CHLORHEXIDINE GLUCONATE 1 APPLICATION(S): 213 SOLUTION TOPICAL at 12:37

## 2020-09-06 RX ADMIN — Medication 400 MILLIGRAM(S): at 12:37

## 2020-09-06 RX ADMIN — MUPIROCIN 1 APPLICATION(S): 20 OINTMENT TOPICAL at 12:02

## 2020-09-06 RX ADMIN — Medication 3 MILLILITER(S): at 12:34

## 2020-09-06 RX ADMIN — OXYCODONE HYDROCHLORIDE 5 MILLIGRAM(S): 5 TABLET ORAL at 08:15

## 2020-09-06 RX ADMIN — Medication 2 UNIT(S): at 14:19

## 2020-09-06 RX ADMIN — BUMETANIDE 2.5 MG/HR: 0.25 INJECTION INTRAMUSCULAR; INTRAVENOUS at 07:25

## 2020-09-06 RX ADMIN — Medication 500 MILLIGRAM(S): at 12:36

## 2020-09-06 RX ADMIN — APIXABAN 5 MILLIGRAM(S): 2.5 TABLET, FILM COATED ORAL at 06:27

## 2020-09-06 RX ADMIN — TIOTROPIUM BROMIDE 1 CAPSULE(S): 18 CAPSULE ORAL; RESPIRATORY (INHALATION) at 12:35

## 2020-09-06 RX ADMIN — Medication 325 MILLIGRAM(S): at 06:27

## 2020-09-06 RX ADMIN — ATORVASTATIN CALCIUM 80 MILLIGRAM(S): 80 TABLET, FILM COATED ORAL at 22:25

## 2020-09-06 RX ADMIN — Medication 81 MILLIGRAM(S): at 12:36

## 2020-09-06 RX ADMIN — Medication 2: at 14:19

## 2020-09-06 RX ADMIN — OXYCODONE HYDROCHLORIDE 5 MILLIGRAM(S): 5 TABLET ORAL at 18:50

## 2020-09-06 RX ADMIN — Medication 100 MILLIGRAM(S): at 06:27

## 2020-09-06 RX ADMIN — Medication 1 TABLET(S): at 12:36

## 2020-09-06 RX ADMIN — Medication 2 UNIT(S): at 20:14

## 2020-09-06 RX ADMIN — Medication 4 UNIT(S): at 10:11

## 2020-09-06 RX ADMIN — OXYCODONE HYDROCHLORIDE 5 MILLIGRAM(S): 5 TABLET ORAL at 18:19

## 2020-09-06 RX ADMIN — SENNA PLUS 2 TABLET(S): 8.6 TABLET ORAL at 22:34

## 2020-09-06 RX ADMIN — Medication 100 MILLIGRAM(S): at 14:20

## 2020-09-06 RX ADMIN — Medication 5 MILLILITER(S): at 18:20

## 2020-09-06 RX ADMIN — Medication 3 MILLILITER(S): at 18:16

## 2020-09-06 RX ADMIN — Medication 2000 UNIT(S): at 12:35

## 2020-09-06 RX ADMIN — OXYCODONE HYDROCHLORIDE 5 MILLIGRAM(S): 5 TABLET ORAL at 07:25

## 2020-09-06 RX ADMIN — PANTOPRAZOLE SODIUM 40 MILLIGRAM(S): 20 TABLET, DELAYED RELEASE ORAL at 06:27

## 2020-09-06 RX ADMIN — Medication 100 MILLIGRAM(S): at 22:25

## 2020-09-06 RX ADMIN — Medication 5 MILLILITER(S): at 06:26

## 2020-09-06 RX ADMIN — Medication 180 MILLIGRAM(S): at 06:27

## 2020-09-06 RX ADMIN — APIXABAN 5 MILLIGRAM(S): 2.5 TABLET, FILM COATED ORAL at 18:20

## 2020-09-06 RX ADMIN — POLYETHYLENE GLYCOL 3350 17 GRAM(S): 17 POWDER, FOR SOLUTION ORAL at 12:37

## 2020-09-06 RX ADMIN — BUDESONIDE AND FORMOTEROL FUMARATE DIHYDRATE 2 PUFF(S): 160; 4.5 AEROSOL RESPIRATORY (INHALATION) at 18:16

## 2020-09-06 RX ADMIN — Medication 1: at 10:12

## 2020-09-06 RX ADMIN — MONTELUKAST 10 MILLIGRAM(S): 4 TABLET, CHEWABLE ORAL at 12:35

## 2020-09-06 RX ADMIN — Medication 3 MILLILITER(S): at 06:26

## 2020-09-06 RX ADMIN — Medication 50 MILLIEQUIVALENT(S): at 22:26

## 2020-09-06 RX ADMIN — INSULIN GLARGINE 12 UNIT(S): 100 INJECTION, SOLUTION SUBCUTANEOUS at 22:34

## 2020-09-06 RX ADMIN — Medication 40 MILLIEQUIVALENT(S): at 22:26

## 2020-09-06 RX ADMIN — BUDESONIDE AND FORMOTEROL FUMARATE DIHYDRATE 2 PUFF(S): 160; 4.5 AEROSOL RESPIRATORY (INHALATION) at 06:35

## 2020-09-06 NOTE — PROGRESS NOTE ADULT - ASSESSMENT
EVAN 1/7/19: no ALINA thrombus, unable to assess LV sys fx  echo 12/7/18: EF 71%, nl LV sys fx, mild diastolic dysfx     a/p    60 year old female with hx of D CHF, HTN, CAD s/p PCI, Afib/ aflutter, s/p Aflutter Ablation 12/2019, COPD, DM2, Anxiety, , raynaud phenomenon presenting with weakness, SOB , hyperkalemia.        1. Acute on Chronic Diastolic CHF   -volume status continues to improve, reports improved dyspnea  -would cont bumex gtt as ordered for now as much as renal fxn tolerates  -close monitoring of electrolytes, renal function, and C02  -echo with normal LVEF     2. Dyspnea, Resp failure  -secondary to chronic lung disease, COPD, volume overload  -pulm f/u   -covid 19 negative  -ecg with known RBBB, new twi noted, hyperkalemia also noted on admission  -echo with normal LVEF, mild diastolic dysfunction   -s/p PO Prednisone, on bumex gtt   -on Duoneb, Symbicort, Spiriva and Theophylline.    3. CAD s/p PCI   -stable, no cp  -c/w ASA, statin  -echo noted above     4. Afib/aflutter s/p  Aflutter Ablation 12/2019  -in NSR, cw cardizem  mg daily  -CHADsVac=2, c/w eliquis       dvt ppx

## 2020-09-06 NOTE — PROGRESS NOTE ADULT - SUBJECTIVE AND OBJECTIVE BOX
CARDIOLOGY FOLLOW UP NOTE - DR. THOMAS    Subjective:    feels better, less dyspnea    denies chest pain, dyspnea, palpitations, dizziness  ROS: otherwise negative   overnight events:      PHYSICAL EXAM:  T(C): 36.6 (09-06-20 @ 00:45), Max: 37.1 (09-05-20 @ 12:27)  HR: 96 (09-06-20 @ 09:06) (76 - 96)  BP: 119/63 (09-06-20 @ 06:24) (119/63 - 139/79)  RR: 18 (09-06-20 @ 00:45) (18 - 24)  SpO2: 95% (09-06-20 @ 09:06) (94% - 96%)  Wt(kg): --  I&O's Summary    05 Sep 2020 07:01  -  06 Sep 2020 07:00  --------------------------------------------------------  IN: 666 mL / OUT: 1400 mL / NET: -734 mL    06 Sep 2020 07:01  -  06 Sep 2020 11:36  --------------------------------------------------------  IN: 0 mL / OUT: 800 mL / NET: -800 mL      Daily     Daily     Appearance: Normal	  Cardiovascular: Normal S1 S2,RRR, No JVD, No murmurs  Respiratory: Lungs clear to auscultation	  Gastrointestinal:  Soft, Non-tender, + BS	  Extremities: Normal range of motion, edema b/l      Home Medications:  apixaban 5 mg oral tablet: 1 tab(s) orally every 12 hours (01 Aug 2020 21:52)  aspirin 81 mg oral delayed release tablet: 1 tab(s) orally once a day (01 Aug 2020 21:52)  CoQ10: 200 milligram(s) orally once a day (01 Aug 2020 21:52)  metFORMIN 500 mg oral tablet: 1 tab(s) orally 2 times a day (01 Aug 2020 21:52)  Multiple Vitamins oral tablet: 1 tab(s) orally once a day (01 Aug 2020 21:52)  nitroglycerin 0.4 mg sublingual tablet: 1 tab(s) sublingual every 5 minutes, As Needed (01 Aug 2020 21:52)  Onglyza 5 mg oral tablet: 1 tab(s) orally once a day (01 Aug 2020 21:52)  pantoprazole 40 mg oral delayed release tablet: 1 tab(s) orally once a day (before a meal) (01 Aug 2020 21:52)  predniSONE 20 mg oral tablet: 2 tab(s) orally once a day (01 Aug 2020 21:52)  rosuvastatin 20 mg oral tablet: 1 tab(s) orally once a day (01 Aug 2020 21:52)  Singulair 10 mg oral tablet: 1 tab(s) orally once a day (in the evening) (01 Aug 2020 21:52)  Spiriva 18 mcg inhalation capsule: 1 cap(s) inhaled once a day (01 Aug 2020 21:52)  Symbicort 160 mcg-4.5 mcg/inh inhalation aerosol: 2 puff(s) inhaled 2 times a day (01 Aug 2020 21:52)  theophylline 400 mg/24 hours oral tablet, extended release: 1 tab(s) orally once a day (01 Aug 2020 21:52)  Ventolin HFA 90 mcg/inh inhalation aerosol: 2 puff(s) inhaled 2 times a day (01 Aug 2020 21:52)  Vitamin D3 2000 intl units oral tablet: 1 tab(s) orally once a day (01 Aug 2020 21:52)      MEDICATIONS  (STANDING):  acetaminophen  IVPB .. 1000 milliGRAM(s) IV Intermittent once  albuterol/ipratropium for Nebulization 3 milliLiter(s) Nebulizer every 6 hours  apixaban 5 milliGRAM(s) Oral every 12 hours  ascorbic acid 500 milliGRAM(s) Oral daily  aspirin enteric coated 81 milliGRAM(s) Oral daily  atorvastatin 80 milliGRAM(s) Oral at bedtime  azithromycin   Tablet 250 milliGRAM(s) Oral <User Schedule>  Biotene Dry Mouth Oral Rinse 5 milliLiter(s) Swish and Spit two times a day  bisacodyl Suppository 10 milliGRAM(s) Rectal daily  budesonide 160 MICROgram(s)/formoterol 4.5 MICROgram(s) Inhaler 2 Puff(s) Inhalation two times a day  buMETAnide Infusion 0.5 mG/Hr (2.5 mL/Hr) IV Continuous <Continuous>  ceFAZolin   IVPB      ceFAZolin   IVPB 1000 milliGRAM(s) IV Intermittent every 8 hours  chlorhexidine 2% Cloths 1 Application(s) Topical daily  cholecalciferol 2000 Unit(s) Oral daily  dextrose 5%. 1000 milliLiter(s) (50 mL/Hr) IV Continuous <Continuous>  dextrose 50% Injectable 25 Gram(s) IV Push once  diltiazem    milliGRAM(s) Oral daily  ferrous    sulfate 325 milliGRAM(s) Oral two times a day  insulin glargine Injectable (LANTUS) 12 Unit(s) SubCutaneous at bedtime  insulin lispro (HumaLOG) corrective regimen sliding scale   SubCutaneous three times a day before meals  insulin lispro (HumaLOG) corrective regimen sliding scale   SubCutaneous at bedtime  insulin lispro Injectable (HumaLOG) 2 Unit(s) SubCutaneous three times a day with meals  lactulose Syrup 15 Gram(s) Oral two times a day  montelukast 10 milliGRAM(s) Oral daily  multivitamin 1 Tablet(s) Oral daily  mupirocin 2% Ointment 1 Application(s) Topical <User Schedule>  pantoprazole    Tablet 40 milliGRAM(s) Oral before breakfast  polyethylene glycol 3350 17 Gram(s) Oral daily  senna 2 Tablet(s) Oral at bedtime  theophylline ER (24 Hour) 400 milliGRAM(s) Oral daily  tiotropium 18 MICROgram(s) Capsule 1 Capsule(s) Inhalation daily      TELEMETRY: 	    ECG:  	  RADIOLOGY:   DIAGNOSTIC TESTING:  [ ] Echocardiogram:  [ ] Catheterization:  [ ] Stress Test:    OTHER: 	    LABS:	 	    CARDIAC MARKERS:                                8.3    8.92  )-----------( 428      ( 06 Sep 2020 09:54 )             29.1     09-06    139  |  87<L>  |  24<H>  ----------------------------<  167<H>  3.5   |  42<H>  |  1.19    Ca    9.7      06 Sep 2020 09:54  Mg     2.0     09-06      proBNP:     Lipid Profile:   HgA1c:     Creatinine, Serum: 1.19 mg/dL (09-06-20 @ 09:54)  Creatinine, Serum: 1.20 mg/dL (09-05-20 @ 09:27)  Creatinine, Serum: 1.09 mg/dL (09-04-20 @ 09:37)  Creatinine, Serum: 1.10 mg/dL (09-04-20 @ 09:37)

## 2020-09-06 NOTE — PROGRESS NOTE ADULT - ASSESSMENT
60F w COPD on 3LNC (?pending transplant list at [facility]), former smoker, CAD s/p stent (2016), afib on eliquis, uncontrolled DM2, raynaud phenomenon and recent hospitalization at [facility] for altered mental status and [facility] aw COPD exacerbation, LE swelling, weakness.     Prolonged hospitalization.

## 2020-09-06 NOTE — PROGRESS NOTE ADULT - PROBLEM SELECTOR PLAN 1
- Dyspnea multifactorial in the setting of underlying lung disease, cardiovascular   dysfunction, obesity, and deconditioning.   - Was given prolonged course of Prednisone, now tapered off. Overnight and prn   Bipap. Pt comfortable off Bipap and able to speak in full sentences.   - cont azithro, duonebs, spiriva, symbicort, singulair  - Continue supplemental O2 with goal O2 sat > 88% - she does not need to be at   100%.  - Theophiline at 3.2.  -as per discussion with Dr. Mathew, pt was getting evaluated at [facility] by Dr. Cervantes for lung transplant. However she is NOT listed currently to receive a transplant.

## 2020-09-06 NOTE — PROGRESS NOTE ADULT - SUBJECTIVE AND OBJECTIVE BOX
Patient is a 60y old  Female who presents with a chief complaint of 60F p/w generalized weakness and difficulty walking (05 Sep 2020 10:47)      SUBJECTIVE / OVERNIGHT EVENTS:  Reports diuresing very well.  Several episodes of hypoglycemia.    MEDICATIONS  (STANDING):  acetaminophen  IVPB .. 1000 milliGRAM(s) IV Intermittent once  albuterol/ipratropium for Nebulization 3 milliLiter(s) Nebulizer every 6 hours  apixaban 5 milliGRAM(s) Oral every 12 hours  ascorbic acid 500 milliGRAM(s) Oral daily  aspirin enteric coated 81 milliGRAM(s) Oral daily  atorvastatin 80 milliGRAM(s) Oral at bedtime  azithromycin   Tablet 250 milliGRAM(s) Oral <User Schedule>  Biotene Dry Mouth Oral Rinse 5 milliLiter(s) Swish and Spit two times a day  bisacodyl Suppository 10 milliGRAM(s) Rectal daily  budesonide 160 MICROgram(s)/formoterol 4.5 MICROgram(s) Inhaler 2 Puff(s) Inhalation two times a day  buMETAnide Infusion 0.5 mG/Hr (2.5 mL/Hr) IV Continuous <Continuous>  ceFAZolin   IVPB      ceFAZolin   IVPB 1000 milliGRAM(s) IV Intermittent every 8 hours  chlorhexidine 2% Cloths 1 Application(s) Topical daily  cholecalciferol 2000 Unit(s) Oral daily  dextrose 5%. 1000 milliLiter(s) (50 mL/Hr) IV Continuous <Continuous>  dextrose 50% Injectable 25 Gram(s) IV Push once  diltiazem    milliGRAM(s) Oral daily  ferrous    sulfate 325 milliGRAM(s) Oral two times a day  insulin glargine Injectable (LANTUS) 12 Unit(s) SubCutaneous at bedtime  insulin lispro (HumaLOG) corrective regimen sliding scale   SubCutaneous three times a day before meals  insulin lispro (HumaLOG) corrective regimen sliding scale   SubCutaneous at bedtime  insulin lispro Injectable (HumaLOG) 4 Unit(s) SubCutaneous three times a day with meals  lactulose Syrup 15 Gram(s) Oral two times a day  montelukast 10 milliGRAM(s) Oral daily  multivitamin 1 Tablet(s) Oral daily  mupirocin 2% Ointment 1 Application(s) Topical <User Schedule>  pantoprazole    Tablet 40 milliGRAM(s) Oral before breakfast  polyethylene glycol 3350 17 Gram(s) Oral daily  senna 2 Tablet(s) Oral at bedtime  theophylline ER (24 Hour) 400 milliGRAM(s) Oral daily  tiotropium 18 MICROgram(s) Capsule 1 Capsule(s) Inhalation daily    MEDICATIONS  (PRN):  glucagon  Injectable 1 milliGRAM(s) IntraMuscular once PRN Glucose LESS THAN 70 milligrams/deciliter  guaiFENesin   Syrup  (Sugar-Free) 100 milliGRAM(s) Oral every 6 hours PRN Cough  oxyCODONE    IR 5 milliGRAM(s) Oral every 6 hours PRN Severe Pain (7 - 10)      CAPILLARY BLOOD GLUCOSE      POCT Blood Glucose.: 176 mg/dL (06 Sep 2020 09:22)  POCT Blood Glucose.: 106 mg/dL (05 Sep 2020 21:53)  POCT Blood Glucose.: 155 mg/dL (05 Sep 2020 17:34)  POCT Blood Glucose.: 138 mg/dL (05 Sep 2020 13:42)  POCT Blood Glucose.: 59 mg/dL (05 Sep 2020 12:27)    I&O's Summary    05 Sep 2020 07:01  -  06 Sep 2020 07:00  --------------------------------------------------------  IN: 666 mL / OUT: 1400 mL / NET: -734 mL    06 Sep 2020 07:01  -  06 Sep 2020 10:36  --------------------------------------------------------  IN: 0 mL / OUT: 800 mL / NET: -800 mL        PHYSICAL EXAM:  Vital Signs Last 24 Hrs  T(C): 36.6 (06 Sep 2020 00:45), Max: 37.1 (05 Sep 2020 12:27)  T(F): 97.9 (06 Sep 2020 00:45), Max: 98.8 (05 Sep 2020 12:27)  HR: 96 (06 Sep 2020 09:06) (76 - 96)  BP: 119/63 (06 Sep 2020 06:24) (119/63 - 139/79)  BP(mean): --  RR: 18 (06 Sep 2020 00:45) (18 - 24)  SpO2: 95% (06 Sep 2020 09:06) (94% - 96%)  GENERAL: NAD, well-developed  HEAD:  Atraumatic, Normocephalic  EYES: EOMI, PERRLA, conjunctiva and sclera clear  NECK: Supple, No JVD  CHEST/LUNG: Clear to auscultation bilaterally; No wheeze  HEART: Regular rate and rhythm; No murmurs, rubs, or gallops  ABDOMEN: Soft, Nontender, Nondistended; Bowel sounds present  EXTREMITIES:  2+ Peripheral Pulses, No clubbing, cyanosis. 2+ edema  NEUROLOGY: non-focal  SKIN: No rashes or lesions    LABS:                        8.3    8.92  )-----------( 428      ( 06 Sep 2020 09:54 )             29.1     09-06    139  |  87<L>  |  24<H>  ----------------------------<  167<H>  3.5   |  42<H>  |  1.19    Ca    9.7      06 Sep 2020 09:54  Mg     2.0     09-06                RADIOLOGY & ADDITIONAL TESTS:    Imaging Personally Reviewed:    Consultant(s) Notes Reviewed:      Care Discussed with Consultants/Other Providers:

## 2020-09-06 NOTE — PROGRESS NOTE ADULT - PROBLEM SELECTOR PLAN 2
- C/w bumex gtt at 0.5mg/hr, urine output overnight remains steady   - Monitor BMP, Mg, Phos Q12 hrs and replete as needed   - Diamox on hold for now.   - Strict ins and outs  - C/w diuresis as per cardiology   - LE edema is improving

## 2020-09-07 LAB
ANION GAP SERPL CALC-SCNC: 11 MMOL/L — SIGNIFICANT CHANGE UP (ref 5–17)
BUN SERPL-MCNC: 26 MG/DL — HIGH (ref 7–23)
CALCIUM SERPL-MCNC: 9.9 MG/DL — SIGNIFICANT CHANGE UP (ref 8.4–10.5)
CHLORIDE SERPL-SCNC: 88 MMOL/L — LOW (ref 96–108)
CO2 SERPL-SCNC: 38 MMOL/L — HIGH (ref 22–31)
CREAT SERPL-MCNC: 1.1 MG/DL — SIGNIFICANT CHANGE UP (ref 0.5–1.3)
GLUCOSE BLDC GLUCOMTR-MCNC: 131 MG/DL — HIGH (ref 70–99)
GLUCOSE BLDC GLUCOMTR-MCNC: 164 MG/DL — HIGH (ref 70–99)
GLUCOSE BLDC GLUCOMTR-MCNC: 188 MG/DL — HIGH (ref 70–99)
GLUCOSE BLDC GLUCOMTR-MCNC: 265 MG/DL — HIGH (ref 70–99)
GLUCOSE SERPL-MCNC: 125 MG/DL — HIGH (ref 70–99)
HCT VFR BLD CALC: 29.2 % — LOW (ref 34.5–45)
HGB BLD-MCNC: 8.3 G/DL — LOW (ref 11.5–15.5)
MAGNESIUM SERPL-MCNC: 2.2 MG/DL — SIGNIFICANT CHANGE UP (ref 1.6–2.6)
MCHC RBC-ENTMCNC: 24.6 PG — LOW (ref 27–34)
MCHC RBC-ENTMCNC: 28.4 GM/DL — LOW (ref 32–36)
MCV RBC AUTO: 86.6 FL — SIGNIFICANT CHANGE UP (ref 80–100)
NRBC # BLD: 0 /100 WBCS — SIGNIFICANT CHANGE UP (ref 0–0)
PHOSPHATE SERPL-MCNC: 4 MG/DL — SIGNIFICANT CHANGE UP (ref 2.5–4.5)
PLATELET # BLD AUTO: 525 K/UL — HIGH (ref 150–400)
POTASSIUM SERPL-MCNC: 3.8 MMOL/L — SIGNIFICANT CHANGE UP (ref 3.5–5.3)
POTASSIUM SERPL-SCNC: 3.8 MMOL/L — SIGNIFICANT CHANGE UP (ref 3.5–5.3)
RBC # BLD: 3.37 M/UL — LOW (ref 3.8–5.2)
RBC # FLD: 19.1 % — HIGH (ref 10.3–14.5)
SODIUM SERPL-SCNC: 137 MMOL/L — SIGNIFICANT CHANGE UP (ref 135–145)
WBC # BLD: 8.34 K/UL — SIGNIFICANT CHANGE UP (ref 3.8–10.5)
WBC # FLD AUTO: 8.34 K/UL — SIGNIFICANT CHANGE UP (ref 3.8–10.5)

## 2020-09-07 PROCEDURE — 99233 SBSQ HOSP IP/OBS HIGH 50: CPT

## 2020-09-07 RX ORDER — ALPRAZOLAM 0.25 MG
0.25 TABLET ORAL ONCE
Refills: 0 | Status: DISCONTINUED | OUTPATIENT
Start: 2020-09-07 | End: 2020-09-07

## 2020-09-07 RX ADMIN — BUDESONIDE AND FORMOTEROL FUMARATE DIHYDRATE 2 PUFF(S): 160; 4.5 AEROSOL RESPIRATORY (INHALATION) at 05:39

## 2020-09-07 RX ADMIN — Medication 3 MILLILITER(S): at 14:29

## 2020-09-07 RX ADMIN — Medication 2 UNIT(S): at 10:38

## 2020-09-07 RX ADMIN — Medication 180 MILLIGRAM(S): at 05:40

## 2020-09-07 RX ADMIN — LACTULOSE 15 GRAM(S): 10 SOLUTION ORAL at 18:16

## 2020-09-07 RX ADMIN — Medication 2000 UNIT(S): at 14:05

## 2020-09-07 RX ADMIN — Medication 3 MILLILITER(S): at 18:14

## 2020-09-07 RX ADMIN — Medication 81 MILLIGRAM(S): at 14:06

## 2020-09-07 RX ADMIN — APIXABAN 5 MILLIGRAM(S): 2.5 TABLET, FILM COATED ORAL at 05:39

## 2020-09-07 RX ADMIN — Medication 1: at 23:03

## 2020-09-07 RX ADMIN — Medication 400 MILLIGRAM(S): at 14:06

## 2020-09-07 RX ADMIN — Medication 1 TABLET(S): at 14:07

## 2020-09-07 RX ADMIN — TIOTROPIUM BROMIDE 1 CAPSULE(S): 18 CAPSULE ORAL; RESPIRATORY (INHALATION) at 14:04

## 2020-09-07 RX ADMIN — BUDESONIDE AND FORMOTEROL FUMARATE DIHYDRATE 2 PUFF(S): 160; 4.5 AEROSOL RESPIRATORY (INHALATION) at 18:17

## 2020-09-07 RX ADMIN — CHLORHEXIDINE GLUCONATE 1 APPLICATION(S): 213 SOLUTION TOPICAL at 14:29

## 2020-09-07 RX ADMIN — POLYETHYLENE GLYCOL 3350 17 GRAM(S): 17 POWDER, FOR SOLUTION ORAL at 06:27

## 2020-09-07 RX ADMIN — Medication 5 MILLILITER(S): at 18:15

## 2020-09-07 RX ADMIN — PANTOPRAZOLE SODIUM 40 MILLIGRAM(S): 20 TABLET, DELAYED RELEASE ORAL at 05:40

## 2020-09-07 RX ADMIN — Medication 500 MILLIGRAM(S): at 14:06

## 2020-09-07 RX ADMIN — Medication 2 UNIT(S): at 18:18

## 2020-09-07 RX ADMIN — OXYCODONE HYDROCHLORIDE 5 MILLIGRAM(S): 5 TABLET ORAL at 05:47

## 2020-09-07 RX ADMIN — Medication 3 MILLILITER(S): at 00:48

## 2020-09-07 RX ADMIN — AZITHROMYCIN 250 MILLIGRAM(S): 500 TABLET, FILM COATED ORAL at 18:16

## 2020-09-07 RX ADMIN — Medication 0.25 MILLIGRAM(S): at 23:02

## 2020-09-07 RX ADMIN — INSULIN GLARGINE 12 UNIT(S): 100 INJECTION, SOLUTION SUBCUTANEOUS at 23:02

## 2020-09-07 RX ADMIN — Medication 2 UNIT(S): at 14:09

## 2020-09-07 RX ADMIN — Medication 3 MILLILITER(S): at 05:39

## 2020-09-07 RX ADMIN — APIXABAN 5 MILLIGRAM(S): 2.5 TABLET, FILM COATED ORAL at 18:15

## 2020-09-07 RX ADMIN — Medication 1: at 14:10

## 2020-09-07 RX ADMIN — Medication 0.25 MILLIGRAM(S): at 00:48

## 2020-09-07 RX ADMIN — OXYCODONE HYDROCHLORIDE 5 MILLIGRAM(S): 5 TABLET ORAL at 06:28

## 2020-09-07 RX ADMIN — SENNA PLUS 2 TABLET(S): 8.6 TABLET ORAL at 23:02

## 2020-09-07 RX ADMIN — Medication 325 MILLIGRAM(S): at 05:39

## 2020-09-07 RX ADMIN — Medication 100 MILLIGRAM(S): at 05:38

## 2020-09-07 RX ADMIN — MONTELUKAST 10 MILLIGRAM(S): 4 TABLET, CHEWABLE ORAL at 14:06

## 2020-09-07 RX ADMIN — BUMETANIDE 2.5 MG/HR: 0.25 INJECTION INTRAMUSCULAR; INTRAVENOUS at 18:11

## 2020-09-07 RX ADMIN — Medication 5 MILLILITER(S): at 05:40

## 2020-09-07 RX ADMIN — ATORVASTATIN CALCIUM 80 MILLIGRAM(S): 80 TABLET, FILM COATED ORAL at 23:02

## 2020-09-07 RX ADMIN — Medication 1: at 18:19

## 2020-09-07 RX ADMIN — Medication 3 MILLILITER(S): at 23:03

## 2020-09-07 RX ADMIN — Medication 325 MILLIGRAM(S): at 18:17

## 2020-09-07 RX ADMIN — MUPIROCIN 1 APPLICATION(S): 20 OINTMENT TOPICAL at 14:30

## 2020-09-07 NOTE — PROGRESS NOTE ADULT - SUBJECTIVE AND OBJECTIVE BOX
Patient is a 60y old  Female who presents with a chief complaint of 60F p/w generalized weakness and difficulty walking (07 Sep 2020 13:23)       INTERVAL HPI/OVERNIGHT EVENTS:  Patient seen and evaluated at bedside.  Pt is resting comfortable in NAD. Denies N/V/F/C.     Allergies    No Known Allergies    Intolerances    albuterol (Unknown)      Vital Signs Last 24 Hrs  T(C): 36.7 (07 Sep 2020 09:14), Max: 36.8 (07 Sep 2020 00:30)  T(F): 98 (07 Sep 2020 09:14), Max: 98.3 (07 Sep 2020 00:30)  HR: 75 (07 Sep 2020 10:32) (70 - 93)  BP: 145/77 (07 Sep 2020 09:14) (120/73 - 145/77)  BP(mean): --  RR: 18 (07 Sep 2020 09:14) (18 - 18)  SpO2: 98% (07 Sep 2020 10:32) (91% - 100%)    LABS:                        8.3    8.34  )-----------( 525      ( 07 Sep 2020 10:50 )             29.2     09-07    137  |  88<L>  |  26<H>  ----------------------------<  125<H>  3.8   |  38<H>  |  1.10    Ca    9.9      07 Sep 2020 10:50  Phos  4.0     09-07  Mg     2.2     09-07          CAPILLARY BLOOD GLUCOSE      POCT Blood Glucose.: 188 mg/dL (07 Sep 2020 14:00)  POCT Blood Glucose.: 131 mg/dL (07 Sep 2020 10:15)  POCT Blood Glucose.: 178 mg/dL (06 Sep 2020 22:24)  POCT Blood Glucose.: 107 mg/dL (06 Sep 2020 20:10)      Lower Extremity Physical Exam:  Vascular: DP/PT n/p, B/L with known h/o raynauds, CFT <_5 seconds B/L, Temperature gradient _warm to cool, B/L.   Neuro: Epicritic sensation intact to the level of _toes, B/L.  Skin: Postive mycotic toenails x10  Wound #1:   Location: dorsum left forefoot  Size: 6cm diameter  Depth: superficial  Wound bed: bullae  Drainage:  clear serous fluid/fluctuant  Odor: none  Periwound: no clinical signs of infection  Etiology: bullae/dm  Erythema resolving to the left foot	     Location: dorsum right forefoot  Size: 5cm diameter  Depth: superficial  Wound bed: bullae  Drainage:  clear serous fluid/fluctuant  Odor: none  Periwound: no clinical signs of infection  Etiology: bullae/dm

## 2020-09-07 NOTE — PROGRESS NOTE ADULT - PROBLEM SELECTOR PLAN 5
- erythema and pain now resolved, dressing changed today, cefazolin for possible cellulitis, day 10/7 today 9/7  - cefazolin d/c'ed today 8/7  - podiatry re-evaluated 9/4  - Continue with daily mupirocin with adaptic and dsd to left forefoot daily, ACE   compression b/l if pt tolerates

## 2020-09-07 NOTE — PROGRESS NOTE ADULT - PROBLEM SELECTOR PLAN 3
-Bumex gtt per cards  -LE edema improving  -S/p 1 dose of diamox 250mg 9/4. Serum CO2 downtrending the past 2 days, would hold off on further doses of diamox for now. F/u AM labs.  -C/o low urine output. Check bladder scan x1.

## 2020-09-07 NOTE — PROGRESS NOTE ADULT - SUBJECTIVE AND OBJECTIVE BOX
Patient is a 60y old  Female who presents with a chief complaint of 60F p/w generalized weakness and difficulty walking (07 Sep 2020 11:32)      SUBJECTIVE / OVERNIGHT EVENTS: Patient seen and examined at bedside. States she feels well, denies any CP, SOb, abd pain and n/v. States her L foot pain is resolved.     ROS:  All other review of systems negative    Allergies    No Known Allergies    Intolerances    albuterol (Unknown)      MEDICATIONS  (STANDING):  acetaminophen  IVPB .. 1000 milliGRAM(s) IV Intermittent once  albuterol/ipratropium for Nebulization 3 milliLiter(s) Nebulizer every 6 hours  apixaban 5 milliGRAM(s) Oral every 12 hours  ascorbic acid 500 milliGRAM(s) Oral daily  aspirin enteric coated 81 milliGRAM(s) Oral daily  atorvastatin 80 milliGRAM(s) Oral at bedtime  azithromycin   Tablet 250 milliGRAM(s) Oral <User Schedule>  Biotene Dry Mouth Oral Rinse 5 milliLiter(s) Swish and Spit two times a day  bisacodyl Suppository 10 milliGRAM(s) Rectal daily  budesonide 160 MICROgram(s)/formoterol 4.5 MICROgram(s) Inhaler 2 Puff(s) Inhalation two times a day  buMETAnide Infusion 0.5 mG/Hr (2.5 mL/Hr) IV Continuous <Continuous>  ceFAZolin   IVPB      ceFAZolin   IVPB 1000 milliGRAM(s) IV Intermittent every 8 hours  chlorhexidine 2% Cloths 1 Application(s) Topical daily  cholecalciferol 2000 Unit(s) Oral daily  dextrose 5%. 1000 milliLiter(s) (50 mL/Hr) IV Continuous <Continuous>  dextrose 50% Injectable 25 Gram(s) IV Push once  diltiazem    milliGRAM(s) Oral daily  ferrous    sulfate 325 milliGRAM(s) Oral two times a day  insulin glargine Injectable (LANTUS) 12 Unit(s) SubCutaneous at bedtime  insulin lispro (HumaLOG) corrective regimen sliding scale   SubCutaneous three times a day before meals  insulin lispro (HumaLOG) corrective regimen sliding scale   SubCutaneous at bedtime  insulin lispro Injectable (HumaLOG) 2 Unit(s) SubCutaneous three times a day with meals  lactulose Syrup 15 Gram(s) Oral two times a day  montelukast 10 milliGRAM(s) Oral daily  multivitamin 1 Tablet(s) Oral daily  mupirocin 2% Ointment 1 Application(s) Topical <User Schedule>  pantoprazole    Tablet 40 milliGRAM(s) Oral before breakfast  polyethylene glycol 3350 17 Gram(s) Oral daily  senna 2 Tablet(s) Oral at bedtime  theophylline ER (24 Hour) 400 milliGRAM(s) Oral daily  tiotropium 18 MICROgram(s) Capsule 1 Capsule(s) Inhalation daily    MEDICATIONS  (PRN):  glucagon  Injectable 1 milliGRAM(s) IntraMuscular once PRN Glucose LESS THAN 70 milligrams/deciliter  guaiFENesin   Syrup  (Sugar-Free) 100 milliGRAM(s) Oral every 6 hours PRN Cough  oxyCODONE    IR 5 milliGRAM(s) Oral every 6 hours PRN Severe Pain (7 - 10)      Vital Signs Last 24 Hrs  T(C): 36.7 (07 Sep 2020 09:14), Max: 36.8 (07 Sep 2020 00:30)  T(F): 98 (07 Sep 2020 09:14), Max: 98.3 (07 Sep 2020 00:30)  HR: 75 (07 Sep 2020 10:32) (70 - 93)  BP: 145/77 (07 Sep 2020 09:14) (120/73 - 145/77)  BP(mean): --  RR: 18 (07 Sep 2020 09:14) (18 - 18)  SpO2: 98% (07 Sep 2020 10:32) (91% - 100%)  CAPILLARY BLOOD GLUCOSE      POCT Blood Glucose.: 131 mg/dL (07 Sep 2020 10:15)  POCT Blood Glucose.: 178 mg/dL (06 Sep 2020 22:24)  POCT Blood Glucose.: 107 mg/dL (06 Sep 2020 20:10)  POCT Blood Glucose.: 214 mg/dL (06 Sep 2020 13:42)    I&O's Summary    06 Sep 2020 07:01  -  07 Sep 2020 07:00  --------------------------------------------------------  IN: 0 mL / OUT: 1800 mL / NET: -1800 mL        PHYSICAL EXAM:  GENERAL: NAD, well-developed  HEAD:  Atraumatic, Normocephalic  EYES: EOMI, PERRLA, conjunctiva and sclera clear  NECK: Supple, No JVD  CHEST/LUNG: Clear to auscultation bilaterally; No wheeze  HEART: Regular rate and rhythm; No murmurs, rubs, or gallops  ABDOMEN: Soft, Nontender, Nondistended; Bowel sounds present  EXTREMITIES:  +3 pitting edema b/l LE till mid thigh, b/l LE dressing c/d/i  NEUROLOGY: AAOx3, non-focal  PSYCH: calm  SKIN: No rashes or lesions    LABS:                        8.3    8.34  )-----------( 525      ( 07 Sep 2020 10:50 )             29.2     09-07    137  |  88<L>  |  26<H>  ----------------------------<  125<H>  3.8   |  38<H>  |  1.10    Ca    9.9      07 Sep 2020 10:50  Phos  4.0     09-07  Mg     2.2     09-07                RADIOLOGY & ADDITIONAL TESTS:    Consultant(s) Notes Reviewed:  Pulmonary and cards rec noted     Care Discussed with Consultants/Other Providers: medicine NP

## 2020-09-07 NOTE — PROGRESS NOTE ADULT - ASSESSMENT
EVAN 1/7/19: no ALINA thrombus, unable to assess LV sys fx  echo 12/7/18: EF 71%, nl LV sys fx, mild diastolic dysfx     a/p    60 year old female with hx of D CHF, HTN, CAD s/p PCI, Afib/ aflutter, s/p Aflutter Ablation 12/2019, COPD, DM2, Anxiety, , raynaud phenomenon presenting with weakness, SOB , hyperkalemia.        1. Acute on Chronic Diastolic CHF   -volume status continues to improve, reports improved dyspnea  -would cont bumex gtt as ordered for now as much as renal fxn tolerates  -close monitoring of electrolytes, renal function, and C02  -diamox prn   -echo with normal LVEF     2. Dyspnea, Resp failure  -secondary to chronic lung disease, COPD, volume overload  -pulm f/u   -covid 19 negative  -ecg with known RBBB, new twi noted, hyperkalemia also noted on admission  -echo with normal LVEF, mild diastolic dysfunction   -s/p PO Prednisone, on bumex gtt   -on Duoneb, Symbicort, Spiriva and Theophylline.    3. CAD s/p PCI   -stable, no cp  -c/w ASA, statin  -echo noted above     4. Afib/aflutter s/p  Aflutter Ablation 12/2019  -in NSR, cw cardizem  mg daily  -CHADsVac=2, c/w eliquis       dvt ppx

## 2020-09-07 NOTE — PROGRESS NOTE ADULT - ASSESSMENT
60F w COPD on 3LNC (?pending transplant list at [facility]), former smoker, CAD s/p stent (2016), afib on eliquis, uncontrolled DM2, raynaud phenomenon and recent hospitalization at [facility] for altered mental status and [facility]A aw COPD exacerbation, LE swelling, weakness.     Prolonged hospitalization.

## 2020-09-07 NOTE — PROGRESS NOTE ADULT - SUBJECTIVE AND OBJECTIVE BOX
Follow-up Pulm Progress Note    Reports using bipap less during day  C/o low urine output  Denies CP    Medications:  MEDICATIONS  (STANDING):  acetaminophen  IVPB .. 1000 milliGRAM(s) IV Intermittent once  albuterol/ipratropium for Nebulization 3 milliLiter(s) Nebulizer every 6 hours  apixaban 5 milliGRAM(s) Oral every 12 hours  ascorbic acid 500 milliGRAM(s) Oral daily  aspirin enteric coated 81 milliGRAM(s) Oral daily  atorvastatin 80 milliGRAM(s) Oral at bedtime  azithromycin   Tablet 250 milliGRAM(s) Oral <User Schedule>  Biotene Dry Mouth Oral Rinse 5 milliLiter(s) Swish and Spit two times a day  bisacodyl Suppository 10 milliGRAM(s) Rectal daily  budesonide 160 MICROgram(s)/formoterol 4.5 MICROgram(s) Inhaler 2 Puff(s) Inhalation two times a day  buMETAnide Infusion 0.5 mG/Hr (2.5 mL/Hr) IV Continuous <Continuous>  ceFAZolin   IVPB      ceFAZolin   IVPB 1000 milliGRAM(s) IV Intermittent every 8 hours  chlorhexidine 2% Cloths 1 Application(s) Topical daily  cholecalciferol 2000 Unit(s) Oral daily  dextrose 5%. 1000 milliLiter(s) (50 mL/Hr) IV Continuous <Continuous>  dextrose 50% Injectable 25 Gram(s) IV Push once  diltiazem    milliGRAM(s) Oral daily  ferrous    sulfate 325 milliGRAM(s) Oral two times a day  insulin glargine Injectable (LANTUS) 12 Unit(s) SubCutaneous at bedtime  insulin lispro (HumaLOG) corrective regimen sliding scale   SubCutaneous three times a day before meals  insulin lispro (HumaLOG) corrective regimen sliding scale   SubCutaneous at bedtime  insulin lispro Injectable (HumaLOG) 2 Unit(s) SubCutaneous three times a day with meals  lactulose Syrup 15 Gram(s) Oral two times a day  montelukast 10 milliGRAM(s) Oral daily  multivitamin 1 Tablet(s) Oral daily  mupirocin 2% Ointment 1 Application(s) Topical <User Schedule>  pantoprazole    Tablet 40 milliGRAM(s) Oral before breakfast  polyethylene glycol 3350 17 Gram(s) Oral daily  senna 2 Tablet(s) Oral at bedtime  theophylline ER (24 Hour) 400 milliGRAM(s) Oral daily  tiotropium 18 MICROgram(s) Capsule 1 Capsule(s) Inhalation daily    MEDICATIONS  (PRN):  glucagon  Injectable 1 milliGRAM(s) IntraMuscular once PRN Glucose LESS THAN 70 milligrams/deciliter  guaiFENesin   Syrup  (Sugar-Free) 100 milliGRAM(s) Oral every 6 hours PRN Cough  oxyCODONE    IR 5 milliGRAM(s) Oral every 6 hours PRN Severe Pain (7 - 10)          Vital Signs Last 24 Hrs  T(C): 36.7 (07 Sep 2020 09:14), Max: 36.8 (07 Sep 2020 00:30)  T(F): 98 (07 Sep 2020 09:14), Max: 98.3 (07 Sep 2020 00:30)  HR: 75 (07 Sep 2020 10:32) (70 - 93)  BP: 145/77 (07 Sep 2020 09:14) (120/73 - 145/77)  BP(mean): --  RR: 18 (07 Sep 2020 09:14) (18 - 18)  SpO2: 98% (07 Sep 2020 10:32) (91% - 100%)    ABG - ( 05 Sep 2020 11:58 )  pH, Arterial: 7.41  pH, Blood: x     /  pCO2: 72    /  pO2: 103   / HCO3: 44    / Base Excess: 17.0  /  SaO2: 97                    09-06 @ 07:01  -  09-07 @ 07:00  --------------------------------------------------------  IN: 0 mL / OUT: 1800 mL / NET: -1800 mL          LABS:                        8.3    8.92  )-----------( 428      ( 06 Sep 2020 09:54 )             29.1     09-06    139  |  87<L>  |  24<H>  ----------------------------<  167<H>  3.5   |  42<H>  |  1.19    Ca    9.7      06 Sep 2020 09:54  Mg     2.0     09-06            CAPILLARY BLOOD GLUCOSE      POCT Blood Glucose.: 131 mg/dL (07 Sep 2020 10:15)              Physical Examination:  PULM: diminished BS throughout   CVS: RRR    RADIOLOGY REVIEWED  CXR 8/1: grossly clear Follow-up Pulm Progress Note    Covering Dr. Coyle    Reports using bipap less during day  C/o low urine output  Denies CP    Medications:  MEDICATIONS  (STANDING):  acetaminophen  IVPB .. 1000 milliGRAM(s) IV Intermittent once  albuterol/ipratropium for Nebulization 3 milliLiter(s) Nebulizer every 6 hours  apixaban 5 milliGRAM(s) Oral every 12 hours  ascorbic acid 500 milliGRAM(s) Oral daily  aspirin enteric coated 81 milliGRAM(s) Oral daily  atorvastatin 80 milliGRAM(s) Oral at bedtime  azithromycin   Tablet 250 milliGRAM(s) Oral <User Schedule>  Biotene Dry Mouth Oral Rinse 5 milliLiter(s) Swish and Spit two times a day  bisacodyl Suppository 10 milliGRAM(s) Rectal daily  budesonide 160 MICROgram(s)/formoterol 4.5 MICROgram(s) Inhaler 2 Puff(s) Inhalation two times a day  buMETAnide Infusion 0.5 mG/Hr (2.5 mL/Hr) IV Continuous <Continuous>  ceFAZolin   IVPB      ceFAZolin   IVPB 1000 milliGRAM(s) IV Intermittent every 8 hours  chlorhexidine 2% Cloths 1 Application(s) Topical daily  cholecalciferol 2000 Unit(s) Oral daily  dextrose 5%. 1000 milliLiter(s) (50 mL/Hr) IV Continuous <Continuous>  dextrose 50% Injectable 25 Gram(s) IV Push once  diltiazem    milliGRAM(s) Oral daily  ferrous    sulfate 325 milliGRAM(s) Oral two times a day  insulin glargine Injectable (LANTUS) 12 Unit(s) SubCutaneous at bedtime  insulin lispro (HumaLOG) corrective regimen sliding scale   SubCutaneous three times a day before meals  insulin lispro (HumaLOG) corrective regimen sliding scale   SubCutaneous at bedtime  insulin lispro Injectable (HumaLOG) 2 Unit(s) SubCutaneous three times a day with meals  lactulose Syrup 15 Gram(s) Oral two times a day  montelukast 10 milliGRAM(s) Oral daily  multivitamin 1 Tablet(s) Oral daily  mupirocin 2% Ointment 1 Application(s) Topical <User Schedule>  pantoprazole    Tablet 40 milliGRAM(s) Oral before breakfast  polyethylene glycol 3350 17 Gram(s) Oral daily  senna 2 Tablet(s) Oral at bedtime  theophylline ER (24 Hour) 400 milliGRAM(s) Oral daily  tiotropium 18 MICROgram(s) Capsule 1 Capsule(s) Inhalation daily    MEDICATIONS  (PRN):  glucagon  Injectable 1 milliGRAM(s) IntraMuscular once PRN Glucose LESS THAN 70 milligrams/deciliter  guaiFENesin   Syrup  (Sugar-Free) 100 milliGRAM(s) Oral every 6 hours PRN Cough  oxyCODONE    IR 5 milliGRAM(s) Oral every 6 hours PRN Severe Pain (7 - 10)          Vital Signs Last 24 Hrs  T(C): 36.7 (07 Sep 2020 09:14), Max: 36.8 (07 Sep 2020 00:30)  T(F): 98 (07 Sep 2020 09:14), Max: 98.3 (07 Sep 2020 00:30)  HR: 75 (07 Sep 2020 10:32) (70 - 93)  BP: 145/77 (07 Sep 2020 09:14) (120/73 - 145/77)  BP(mean): --  RR: 18 (07 Sep 2020 09:14) (18 - 18)  SpO2: 98% (07 Sep 2020 10:32) (91% - 100%)    ABG - ( 05 Sep 2020 11:58 )  pH, Arterial: 7.41  pH, Blood: x     /  pCO2: 72    /  pO2: 103   / HCO3: 44    / Base Excess: 17.0  /  SaO2: 97                    09-06 @ 07:01  -  09-07 @ 07:00  --------------------------------------------------------  IN: 0 mL / OUT: 1800 mL / NET: -1800 mL          LABS:                        8.3    8.92  )-----------( 428      ( 06 Sep 2020 09:54 )             29.1     09-06    139  |  87<L>  |  24<H>  ----------------------------<  167<H>  3.5   |  42<H>  |  1.19    Ca    9.7      06 Sep 2020 09:54  Mg     2.0     09-06            CAPILLARY BLOOD GLUCOSE      POCT Blood Glucose.: 131 mg/dL (07 Sep 2020 10:15)              Physical Examination:  PULM: diminished BS throughout   CVS: RRR    RADIOLOGY REVIEWED  CXR 8/1: grossly clear

## 2020-09-07 NOTE — PROGRESS NOTE ADULT - SUBJECTIVE AND OBJECTIVE BOX
CARDIOLOGY FOLLOW UP NOTE - DR. THOMAS    Subjective:    denies chest pain, dyspnea, palpitations, dizziness  ROS: otherwise negative   overnight events:      PHYSICAL EXAM:  T(C): 36.7 (09-07-20 @ 09:14), Max: 36.8 (09-07-20 @ 00:30)  HR: 75 (09-07-20 @ 10:32) (70 - 93)  BP: 145/77 (09-07-20 @ 09:14) (120/73 - 145/77)  RR: 18 (09-07-20 @ 09:14) (18 - 18)  SpO2: 98% (09-07-20 @ 10:32) (91% - 100%)  Wt(kg): --  I&O's Summary    06 Sep 2020 07:01  -  07 Sep 2020 07:00  --------------------------------------------------------  IN: 0 mL / OUT: 1800 mL / NET: -1800 mL      Daily     Daily     Appearance: Normal	  Cardiovascular: Normal S1 S2,RRR, No JVD, No murmurs  Respiratory: Lungs clear to auscultation	  Gastrointestinal:  Soft, Non-tender, + BS	  Extremities: Normal range of motion, less edema      Home Medications:  apixaban 5 mg oral tablet: 1 tab(s) orally every 12 hours (01 Aug 2020 21:52)  aspirin 81 mg oral delayed release tablet: 1 tab(s) orally once a day (01 Aug 2020 21:52)  CoQ10: 200 milligram(s) orally once a day (01 Aug 2020 21:52)  metFORMIN 500 mg oral tablet: 1 tab(s) orally 2 times a day (01 Aug 2020 21:52)  Multiple Vitamins oral tablet: 1 tab(s) orally once a day (01 Aug 2020 21:52)  nitroglycerin 0.4 mg sublingual tablet: 1 tab(s) sublingual every 5 minutes, As Needed (01 Aug 2020 21:52)  Onglyza 5 mg oral tablet: 1 tab(s) orally once a day (01 Aug 2020 21:52)  pantoprazole 40 mg oral delayed release tablet: 1 tab(s) orally once a day (before a meal) (01 Aug 2020 21:52)  predniSONE 20 mg oral tablet: 2 tab(s) orally once a day (01 Aug 2020 21:52)  rosuvastatin 20 mg oral tablet: 1 tab(s) orally once a day (01 Aug 2020 21:52)  Singulair 10 mg oral tablet: 1 tab(s) orally once a day (in the evening) (01 Aug 2020 21:52)  Spiriva 18 mcg inhalation capsule: 1 cap(s) inhaled once a day (01 Aug 2020 21:52)  Symbicort 160 mcg-4.5 mcg/inh inhalation aerosol: 2 puff(s) inhaled 2 times a day (01 Aug 2020 21:52)  theophylline 400 mg/24 hours oral tablet, extended release: 1 tab(s) orally once a day (01 Aug 2020 21:52)  Ventolin HFA 90 mcg/inh inhalation aerosol: 2 puff(s) inhaled 2 times a day (01 Aug 2020 21:52)  Vitamin D3 2000 intl units oral tablet: 1 tab(s) orally once a day (01 Aug 2020 21:52)      MEDICATIONS  (STANDING):  acetaminophen  IVPB .. 1000 milliGRAM(s) IV Intermittent once  albuterol/ipratropium for Nebulization 3 milliLiter(s) Nebulizer every 6 hours  apixaban 5 milliGRAM(s) Oral every 12 hours  ascorbic acid 500 milliGRAM(s) Oral daily  aspirin enteric coated 81 milliGRAM(s) Oral daily  atorvastatin 80 milliGRAM(s) Oral at bedtime  azithromycin   Tablet 250 milliGRAM(s) Oral <User Schedule>  Biotene Dry Mouth Oral Rinse 5 milliLiter(s) Swish and Spit two times a day  bisacodyl Suppository 10 milliGRAM(s) Rectal daily  budesonide 160 MICROgram(s)/formoterol 4.5 MICROgram(s) Inhaler 2 Puff(s) Inhalation two times a day  buMETAnide Infusion 0.5 mG/Hr (2.5 mL/Hr) IV Continuous <Continuous>  ceFAZolin   IVPB      ceFAZolin   IVPB 1000 milliGRAM(s) IV Intermittent every 8 hours  chlorhexidine 2% Cloths 1 Application(s) Topical daily  cholecalciferol 2000 Unit(s) Oral daily  dextrose 5%. 1000 milliLiter(s) (50 mL/Hr) IV Continuous <Continuous>  dextrose 50% Injectable 25 Gram(s) IV Push once  diltiazem    milliGRAM(s) Oral daily  ferrous    sulfate 325 milliGRAM(s) Oral two times a day  insulin glargine Injectable (LANTUS) 12 Unit(s) SubCutaneous at bedtime  insulin lispro (HumaLOG) corrective regimen sliding scale   SubCutaneous three times a day before meals  insulin lispro (HumaLOG) corrective regimen sliding scale   SubCutaneous at bedtime  insulin lispro Injectable (HumaLOG) 2 Unit(s) SubCutaneous three times a day with meals  lactulose Syrup 15 Gram(s) Oral two times a day  montelukast 10 milliGRAM(s) Oral daily  multivitamin 1 Tablet(s) Oral daily  mupirocin 2% Ointment 1 Application(s) Topical <User Schedule>  pantoprazole    Tablet 40 milliGRAM(s) Oral before breakfast  polyethylene glycol 3350 17 Gram(s) Oral daily  senna 2 Tablet(s) Oral at bedtime  theophylline ER (24 Hour) 400 milliGRAM(s) Oral daily  tiotropium 18 MICROgram(s) Capsule 1 Capsule(s) Inhalation daily      TELEMETRY: 	    ECG:  	  RADIOLOGY:   DIAGNOSTIC TESTING:  [ ] Echocardiogram:  [ ] Catheterization:  [ ] Stress Test:    OTHER: 	    LABS:	 	    CARDIAC MARKERS:                                8.3    8.34  )-----------( 525      ( 07 Sep 2020 10:50 )             29.2     09-07    137  |  88<L>  |  26<H>  ----------------------------<  125<H>  3.8   |  38<H>  |  1.10    Ca    9.9      07 Sep 2020 10:50  Phos  4.0     09-07  Mg     2.2     09-07      proBNP:     Lipid Profile:   HgA1c:     Creatinine, Serum: 1.10 mg/dL (09-07-20 @ 10:50)  Creatinine, Serum: 1.19 mg/dL (09-06-20 @ 09:54)  Creatinine, Serum: 1.20 mg/dL (09-05-20 @ 09:27)

## 2020-09-07 NOTE — PROGRESS NOTE ADULT - ASSESSMENT
60F w/ end stage COPD on 3LNC (pending transplant list at [facility]), former smoker, CAD s/p stent (2016), afib on eliquis, uncontrolled DM2, raynaud phenomenon and recent hospitalization at [facility] for altered mental status and [facility] presents to [facility] for evaluation of generalized weakness and difficulty walking admit to medicine for further evaluation. Found to have acute on diastolic HF, COPD exacerbation.

## 2020-09-07 NOTE — PROGRESS NOTE ADULT - PROBLEM SELECTOR PLAN 1
- Dyspnea multifactorial in the setting of underlying lung disease, cardiovascular   dysfunction, obesity, and deconditioning.   - Was given prolonged course of Prednisone, now tapered off. Overnight and prn   Bipap. Pt comfortable off Bipap and able to speak in full sentences.   - cont azithro, duonebs, spiriva, symbicort, singulair  - Continue supplemental O2 with goal O2 sat > 88% - she does not need to be at   100%.  - Theophiline at 3.2.  -as per discussion with Dr. Mathew, pt was getting evaluated at [facility] by Dr. Cervantes for lung transplant. However she is NOT listed currently to receive a transplant.

## 2020-09-07 NOTE — PROGRESS NOTE ADULT - ASSESSMENT
--Left dm foot bullae secondary to fluid retention    --erythema resolving at this time to the left foot, erythema not extending past bulla area, adaptic touch and mupirocin applied  --wound culture reviewed, growing skin richie   --erythema unlikely to be due to cellulitis  --recommend continued daily mupirocin with adaptic and dsd left forefoot daily  --toenails debrided x10 using sterile nippers  --ACE compression bilat if tolerated by patient  --recommend continue podiatric footcare as outpatient with current dpm  --will monitor during this admission

## 2020-09-07 NOTE — PROGRESS NOTE ADULT - PROBLEM SELECTOR PLAN 2
- C/w bumex gtt at 0.5mg/hr, urine output overnight remains steady   - Monitor BMP, Mg, Phos Q12 hrs and replete as needed   - Diamox on hold for now.   - Strict ins and outs, f/u bladder scan today   - C/w diuresis as per cardiology   - LE edema is improving

## 2020-09-08 LAB
ANION GAP SERPL CALC-SCNC: 7 MMOL/L — SIGNIFICANT CHANGE UP (ref 5–17)
BUN SERPL-MCNC: 20 MG/DL — SIGNIFICANT CHANGE UP (ref 7–23)
CALCIUM SERPL-MCNC: 9.5 MG/DL — SIGNIFICANT CHANGE UP (ref 8.4–10.5)
CHLORIDE SERPL-SCNC: 90 MMOL/L — LOW (ref 96–108)
CO2 SERPL-SCNC: 43 MMOL/L — HIGH (ref 22–31)
CREAT SERPL-MCNC: 0.98 MG/DL — SIGNIFICANT CHANGE UP (ref 0.5–1.3)
GLUCOSE BLDC GLUCOMTR-MCNC: 122 MG/DL — HIGH (ref 70–99)
GLUCOSE BLDC GLUCOMTR-MCNC: 151 MG/DL — HIGH (ref 70–99)
GLUCOSE BLDC GLUCOMTR-MCNC: 160 MG/DL — HIGH (ref 70–99)
GLUCOSE BLDC GLUCOMTR-MCNC: 192 MG/DL — HIGH (ref 70–99)
GLUCOSE SERPL-MCNC: 153 MG/DL — HIGH (ref 70–99)
HCT VFR BLD CALC: 29.5 % — LOW (ref 34.5–45)
HGB BLD-MCNC: 8.4 G/DL — LOW (ref 11.5–15.5)
MCHC RBC-ENTMCNC: 24.8 PG — LOW (ref 27–34)
MCHC RBC-ENTMCNC: 28.5 GM/DL — LOW (ref 32–36)
MCV RBC AUTO: 87 FL — SIGNIFICANT CHANGE UP (ref 80–100)
NRBC # BLD: 0 /100 WBCS — SIGNIFICANT CHANGE UP (ref 0–0)
PLATELET # BLD AUTO: 519 K/UL — HIGH (ref 150–400)
POTASSIUM SERPL-MCNC: 3.5 MMOL/L — SIGNIFICANT CHANGE UP (ref 3.5–5.3)
POTASSIUM SERPL-SCNC: 3.5 MMOL/L — SIGNIFICANT CHANGE UP (ref 3.5–5.3)
RBC # BLD: 3.39 M/UL — LOW (ref 3.8–5.2)
RBC # FLD: 18.7 % — HIGH (ref 10.3–14.5)
SODIUM SERPL-SCNC: 140 MMOL/L — SIGNIFICANT CHANGE UP (ref 135–145)
WBC # BLD: 7.62 K/UL — SIGNIFICANT CHANGE UP (ref 3.8–10.5)
WBC # FLD AUTO: 7.62 K/UL — SIGNIFICANT CHANGE UP (ref 3.8–10.5)

## 2020-09-08 PROCEDURE — 99233 SBSQ HOSP IP/OBS HIGH 50: CPT

## 2020-09-08 RX ORDER — ALPRAZOLAM 0.25 MG
0.25 TABLET ORAL ONCE
Refills: 0 | Status: DISCONTINUED | OUTPATIENT
Start: 2020-09-08 | End: 2020-09-08

## 2020-09-08 RX ADMIN — Medication 1: at 11:32

## 2020-09-08 RX ADMIN — Medication 3 MILLILITER(S): at 18:10

## 2020-09-08 RX ADMIN — SENNA PLUS 2 TABLET(S): 8.6 TABLET ORAL at 21:39

## 2020-09-08 RX ADMIN — TIOTROPIUM BROMIDE 1 CAPSULE(S): 18 CAPSULE ORAL; RESPIRATORY (INHALATION) at 11:31

## 2020-09-08 RX ADMIN — MUPIROCIN 1 APPLICATION(S): 20 OINTMENT TOPICAL at 11:38

## 2020-09-08 RX ADMIN — Medication 3 MILLILITER(S): at 11:30

## 2020-09-08 RX ADMIN — POLYETHYLENE GLYCOL 3350 17 GRAM(S): 17 POWDER, FOR SOLUTION ORAL at 06:02

## 2020-09-08 RX ADMIN — Medication 400 MILLIGRAM(S): at 11:31

## 2020-09-08 RX ADMIN — APIXABAN 5 MILLIGRAM(S): 2.5 TABLET, FILM COATED ORAL at 18:10

## 2020-09-08 RX ADMIN — Medication 325 MILLIGRAM(S): at 18:10

## 2020-09-08 RX ADMIN — Medication 500 MILLIGRAM(S): at 11:37

## 2020-09-08 RX ADMIN — Medication 0.25 MILLIGRAM(S): at 22:09

## 2020-09-08 RX ADMIN — Medication 1 TABLET(S): at 11:30

## 2020-09-08 RX ADMIN — Medication 2 UNIT(S): at 11:31

## 2020-09-08 RX ADMIN — Medication 3 MILLILITER(S): at 23:37

## 2020-09-08 RX ADMIN — Medication 2 UNIT(S): at 20:13

## 2020-09-08 RX ADMIN — Medication 1: at 20:12

## 2020-09-08 RX ADMIN — Medication 3 MILLILITER(S): at 06:01

## 2020-09-08 RX ADMIN — MONTELUKAST 10 MILLIGRAM(S): 4 TABLET, CHEWABLE ORAL at 11:30

## 2020-09-08 RX ADMIN — INSULIN GLARGINE 12 UNIT(S): 100 INJECTION, SOLUTION SUBCUTANEOUS at 21:39

## 2020-09-08 RX ADMIN — OXYCODONE HYDROCHLORIDE 5 MILLIGRAM(S): 5 TABLET ORAL at 23:37

## 2020-09-08 RX ADMIN — APIXABAN 5 MILLIGRAM(S): 2.5 TABLET, FILM COATED ORAL at 06:01

## 2020-09-08 RX ADMIN — ATORVASTATIN CALCIUM 80 MILLIGRAM(S): 80 TABLET, FILM COATED ORAL at 21:39

## 2020-09-08 RX ADMIN — BUDESONIDE AND FORMOTEROL FUMARATE DIHYDRATE 2 PUFF(S): 160; 4.5 AEROSOL RESPIRATORY (INHALATION) at 18:10

## 2020-09-08 RX ADMIN — CHLORHEXIDINE GLUCONATE 1 APPLICATION(S): 213 SOLUTION TOPICAL at 11:31

## 2020-09-08 RX ADMIN — PANTOPRAZOLE SODIUM 40 MILLIGRAM(S): 20 TABLET, DELAYED RELEASE ORAL at 06:01

## 2020-09-08 RX ADMIN — BUMETANIDE 2.5 MG/HR: 0.25 INJECTION INTRAMUSCULAR; INTRAVENOUS at 11:38

## 2020-09-08 RX ADMIN — Medication 180 MILLIGRAM(S): at 06:00

## 2020-09-08 RX ADMIN — BUDESONIDE AND FORMOTEROL FUMARATE DIHYDRATE 2 PUFF(S): 160; 4.5 AEROSOL RESPIRATORY (INHALATION) at 06:01

## 2020-09-08 RX ADMIN — Medication 5 MILLILITER(S): at 06:01

## 2020-09-08 RX ADMIN — Medication 5 MILLILITER(S): at 18:11

## 2020-09-08 RX ADMIN — Medication 2000 UNIT(S): at 11:31

## 2020-09-08 RX ADMIN — OXYCODONE HYDROCHLORIDE 5 MILLIGRAM(S): 5 TABLET ORAL at 22:09

## 2020-09-08 RX ADMIN — Medication 325 MILLIGRAM(S): at 06:00

## 2020-09-08 RX ADMIN — Medication 81 MILLIGRAM(S): at 11:31

## 2020-09-08 NOTE — PROGRESS NOTE ADULT - ATTENDING COMMENTS
Agree with above NP note.  cv stable  volume status improving  cont diuresing well with bumex gtt  continue as ordered  diamox prn per pulmo/med  monitor bmp closely

## 2020-09-08 NOTE — PROGRESS NOTE ADULT - PROBLEM SELECTOR PLAN 2
- C/w bumex gtt at 0.5mg/hr, good urine output noted, f/u weight from today   - Monitor BMP, Mg, Phos Q12 hrs and replete as needed   - Diamox on hold for now.   - Strict ins and outs, f/u bladder scan today   - C/w diuresis as per cardiology, will discuss when to titrate down and stop gtt   - LE edema is improving

## 2020-09-08 NOTE — PROGRESS NOTE ADULT - PROBLEM SELECTOR PLAN 1
- Dyspnea multifactorial in the setting of underlying lung disease, cardiovascular   dysfunction, obesity, and deconditioning.   - Was given prolonged course of Prednisone, now tapered off. Overnight and prn   Bipap. Pt comfortable off Bipap and able to speak in full sentences.   - cont azithro, duonebs, spiriva, symbicort, singulair  - Continue supplemental O2 with goal O2 sat > 88% - she does not need to be at   100%.  - Theophiline at 3.2.  -as per discussion with Dr. Mathew, pt was getting evaluated at [facility] by Dr. Cervantes for lung transplant. However she is NOT listed currently to receive a transplant.

## 2020-09-08 NOTE — PROGRESS NOTE ADULT - SUBJECTIVE AND OBJECTIVE BOX
CARDIOLOGY FOLLOW UP - Dr. Brunson    CC no cp/sob   feels well today  edema improving  on nasal canula at time of assesment       PHYSICAL EXAM:  T(C): 36.7 (09-07-20 @ 23:35), Max: 36.7 (09-07-20 @ 23:35)  HR: 84 (09-08-20 @ 05:59) (75 - 87)  BP: 123/77 (09-08-20 @ 05:59) (123/77 - 146/73)  RR: 18 (09-08-20 @ 05:59) (18 - 18)  SpO2: 96% (09-08-20 @ 05:59) (96% - 100%)  Wt(kg): --  I&O's Summary    07 Sep 2020 07:01  -  08 Sep 2020 07:00  --------------------------------------------------------  IN: 1020 mL / OUT: 3750 mL / NET: -2730 mL    08 Sep 2020 07:01  -  08 Sep 2020 12:32  --------------------------------------------------------  IN: 0 mL / OUT: 700 mL / NET: -700 mL        Appearance: Normal	  Cardiovascular: Normal S1 S2,RRR, No JVD, No murmurs  Respiratory: diminished   Gastrointestinal:  Soft, Non-tender, + BS	  Extremities: Normal range of motion, No clubbing, less edema         MEDICATIONS  (STANDING):  acetaminophen  IVPB .. 1000 milliGRAM(s) IV Intermittent once  albuterol/ipratropium for Nebulization 3 milliLiter(s) Nebulizer every 6 hours  apixaban 5 milliGRAM(s) Oral every 12 hours  ascorbic acid 500 milliGRAM(s) Oral daily  aspirin enteric coated 81 milliGRAM(s) Oral daily  atorvastatin 80 milliGRAM(s) Oral at bedtime  azithromycin   Tablet 250 milliGRAM(s) Oral <User Schedule>  Biotene Dry Mouth Oral Rinse 5 milliLiter(s) Swish and Spit two times a day  bisacodyl Suppository 10 milliGRAM(s) Rectal daily  budesonide 160 MICROgram(s)/formoterol 4.5 MICROgram(s) Inhaler 2 Puff(s) Inhalation two times a day  buMETAnide Infusion 0.5 mG/Hr (2.5 mL/Hr) IV Continuous <Continuous>  chlorhexidine 2% Cloths 1 Application(s) Topical daily  cholecalciferol 2000 Unit(s) Oral daily  dextrose 5%. 1000 milliLiter(s) (50 mL/Hr) IV Continuous <Continuous>  dextrose 50% Injectable 25 Gram(s) IV Push once  diltiazem    milliGRAM(s) Oral daily  ferrous    sulfate 325 milliGRAM(s) Oral two times a day  insulin glargine Injectable (LANTUS) 12 Unit(s) SubCutaneous at bedtime  insulin lispro (HumaLOG) corrective regimen sliding scale   SubCutaneous three times a day before meals  insulin lispro (HumaLOG) corrective regimen sliding scale   SubCutaneous at bedtime  insulin lispro Injectable (HumaLOG) 2 Unit(s) SubCutaneous three times a day with meals  lactulose Syrup 15 Gram(s) Oral two times a day  montelukast 10 milliGRAM(s) Oral daily  multivitamin 1 Tablet(s) Oral daily  mupirocin 2% Ointment 1 Application(s) Topical <User Schedule>  pantoprazole    Tablet 40 milliGRAM(s) Oral before breakfast  polyethylene glycol 3350 17 Gram(s) Oral daily  senna 2 Tablet(s) Oral at bedtime  theophylline ER (24 Hour) 400 milliGRAM(s) Oral daily  tiotropium 18 MICROgram(s) Capsule 1 Capsule(s) Inhalation daily      TELEMETRY: 	    ECG:  	  RADIOLOGY:   DIAGNOSTIC TESTING:  [ ] Echocardiogram:  [ ]  Catheterization:  [ ] Stress Test:    OTHER: 	    LABS:	 	                                8.4    7.62  )-----------( 519      ( 08 Sep 2020 09:40 )             29.5     09-08    140  |  90<L>  |  20  ----------------------------<  153<H>  3.5   |  43<H>  |  0.98    Ca    9.5      08 Sep 2020 09:40  Phos  4.0     09-07  Mg     2.2     09-07

## 2020-09-08 NOTE — PROGRESS NOTE ADULT - SUBJECTIVE AND OBJECTIVE BOX
Patient is a 60y old  Female who presents with a chief complaint of 60F p/w generalized weakness and difficulty walking (08 Sep 2020 12:32)      Any change in ROS:   pt feels better today :  berathing wise sheis better  MEDICATIONS  (STANDING):  acetaminophen  IVPB .. 1000 milliGRAM(s) IV Intermittent once  albuterol/ipratropium for Nebulization 3 milliLiter(s) Nebulizer every 6 hours  apixaban 5 milliGRAM(s) Oral every 12 hours  ascorbic acid 500 milliGRAM(s) Oral daily  aspirin enteric coated 81 milliGRAM(s) Oral daily  atorvastatin 80 milliGRAM(s) Oral at bedtime  azithromycin   Tablet 250 milliGRAM(s) Oral <User Schedule>  Biotene Dry Mouth Oral Rinse 5 milliLiter(s) Swish and Spit two times a day  bisacodyl Suppository 10 milliGRAM(s) Rectal daily  budesonide 160 MICROgram(s)/formoterol 4.5 MICROgram(s) Inhaler 2 Puff(s) Inhalation two times a day  buMETAnide Infusion 0.5 mG/Hr (2.5 mL/Hr) IV Continuous <Continuous>  chlorhexidine 2% Cloths 1 Application(s) Topical daily  cholecalciferol 2000 Unit(s) Oral daily  dextrose 5%. 1000 milliLiter(s) (50 mL/Hr) IV Continuous <Continuous>  dextrose 50% Injectable 25 Gram(s) IV Push once  diltiazem    milliGRAM(s) Oral daily  ferrous    sulfate 325 milliGRAM(s) Oral two times a day  insulin glargine Injectable (LANTUS) 12 Unit(s) SubCutaneous at bedtime  insulin lispro (HumaLOG) corrective regimen sliding scale   SubCutaneous three times a day before meals  insulin lispro (HumaLOG) corrective regimen sliding scale   SubCutaneous at bedtime  insulin lispro Injectable (HumaLOG) 2 Unit(s) SubCutaneous three times a day with meals  lactulose Syrup 15 Gram(s) Oral two times a day  montelukast 10 milliGRAM(s) Oral daily  multivitamin 1 Tablet(s) Oral daily  mupirocin 2% Ointment 1 Application(s) Topical <User Schedule>  pantoprazole    Tablet 40 milliGRAM(s) Oral before breakfast  polyethylene glycol 3350 17 Gram(s) Oral daily  senna 2 Tablet(s) Oral at bedtime  theophylline ER (24 Hour) 400 milliGRAM(s) Oral daily  tiotropium 18 MICROgram(s) Capsule 1 Capsule(s) Inhalation daily    MEDICATIONS  (PRN):  glucagon  Injectable 1 milliGRAM(s) IntraMuscular once PRN Glucose LESS THAN 70 milligrams/deciliter  guaiFENesin   Syrup  (Sugar-Free) 100 milliGRAM(s) Oral every 6 hours PRN Cough  oxyCODONE    IR 5 milliGRAM(s) Oral every 6 hours PRN Severe Pain (7 - 10)    Vital Signs Last 24 Hrs  T(C): 36.7 (08 Sep 2020 16:35), Max: 36.7 (07 Sep 2020 23:35)  T(F): 98 (08 Sep 2020 16:35), Max: 98.1 (07 Sep 2020 23:35)  HR: 88 (08 Sep 2020 16:35) (75 - 88)  BP: 131/69 (08 Sep 2020 16:35) (123/77 - 133/84)  BP(mean): --  RR: 18 (08 Sep 2020 16:35) (18 - 18)  SpO2: 98% (08 Sep 2020 16:35) (96% - 100%)    I&O's Summary    07 Sep 2020 07:01  -  08 Sep 2020 07:00  --------------------------------------------------------  IN: 1020 mL / OUT: 3750 mL / NET: -2730 mL    08 Sep 2020 07:01  -  08 Sep 2020 16:55  --------------------------------------------------------  IN: 540 mL / OUT: 1350 mL / NET: -810 mL          Physical Exam:   GENERAL: NAD, well-groomed, well-developed  HEENT: MITCH/   Atraumatic, Normocephalic  ENMT: No tonsillar erythema, exudates, or enlargement; Moist mucous membranes, Good dentition, No lesions  NECK: Supple, No JVD, Normal thyroid  CHEST/LUNG: poor air entry   CVS: Regular rate and rhythm; No murmurs, rubs, or gallops  GI: : Soft, Nontender, Nondistended; Bowel sounds present  NERVOUS SYSTEM:  Alert & Oriented X3  EXTREMITIES:  + edema  LYMPH: No lymphadenopathy noted  SKIN: No rashes or lesions  ENDOCRINOLOGY: No Thyromegaly  PSYCH: Appropriate    Labs:                              8.4    7.62  )-----------( 519      ( 08 Sep 2020 09:40 )             29.5                         8.3    8.34  )-----------( 525      ( 07 Sep 2020 10:50 )             29.2                         8.3    8.92  )-----------( 428      ( 06 Sep 2020 09:54 )             29.1                         9.0    8.54  )-----------( 403      ( 05 Sep 2020 09:27 )             30.9     09-08    140  |  90<L>  |  20  ----------------------------<  153<H>  3.5   |  43<H>  |  0.98  09-07    137  |  88<L>  |  26<H>  ----------------------------<  125<H>  3.8   |  38<H>  |  1.10  09-06    139  |  87<L>  |  24<H>  ----------------------------<  167<H>  3.5   |  42<H>  |  1.19  09-05    139  |  87<L>  |  26<H>  ----------------------------<  148<H>  3.6   |  44<H>  |  1.20    Ca    9.5      08 Sep 2020 09:40  Ca    9.9      07 Sep 2020 10:50  Phos  4.0     09-07  Mg     2.2     09-07      CAPILLARY BLOOD GLUCOSE      POCT Blood Glucose.: 151 mg/dL (08 Sep 2020 10:45)  POCT Blood Glucose.: 122 mg/dL (08 Sep 2020 07:47)  POCT Blood Glucose.: 265 mg/dL (07 Sep 2020 23:01)  POCT Blood Glucose.: 164 mg/dL (07 Sep 2020 18:09)          < from: Xray Chest 1 View- PORTABLE-Routine (08.01.20 @ 16:44) >    EXAM:  XR CHEST PORTABLE ROUTINE 1V                            PROCEDURE DATE:  08/01/2020            INTERPRETATION:  CLINICAL INFORMATION: Screening, history COPD.    TECHNIQUE: Single portable view of the chest.    COMPARISON: 12/11/2019.      IMPRESSION:    The lungs are clear.  Heart size cannot be accurately assessed in this projection.  No acute bone abnormality.        < from: CT Chest No Cont (12.11.19 @ 18:06) >  COMPARISON: Chest radiograph from same day. CT chest 12/4/2018.    PROCEDURE:   CT of the Chest was performed without intravenous contrast.  Sagittal and coronal reformats were performed.      FINDINGS:    LUNGS AND AIRWAYS: Patent central airways. No infectious consolidation. 1   cm right lower lobe subpleural nodule (2:96). Centrilobular emphysema.    PLEURA: No pleural effusion.    MEDIASTINUM AND LILIANA: Small mediastinal lymph nodes.    VESSELS: Coronary atherosclerosis.    HEART: Heart size is normal. No pericardial effusion.    CHEST WALL AND LOWER NECK: Within normal limits.    VISUALIZED UPPER ABDOMEN: Within normal limits.    BONES: Degenerative changes.    IMPRESSION:     No infectious consolidation.    1 cm right lower lobe subpleural nodule or atelectatic focus. Follow-up   CT chest in 3 months could be considered.                PAT VASQUEZ M.D., RADIOLOGY RESIDENT  This document has been electronically signed.  JOSH JOLLEY M.D., ATTENDING RADIOLOGIST  This document has been electronically signed. Dec 12 2019 11:46AM        < end of copied text >        GOVIND SIMMONS M.D., RESIDENT RADIOLOGIST  This document has been electronically signed.  CHELSEY SMITH M.D., ATTENDING RADIOLOGIST  This document has been electronically signed. Aug  2 2020  9:05AM              < end of copied text >          RECENT CULTURES:        RESPIRATORY CULTURES:          Studies  Chest X-RAY  CT SCAN Chest   Venous Dopplers: LE:   CT Abdomen  Others

## 2020-09-08 NOTE — PROGRESS NOTE ADULT - SUBJECTIVE AND OBJECTIVE BOX
Patient is a 60y old  Female who presents with a chief complaint of 60F p/w generalized weakness and difficulty walking (07 Sep 2020 14:06)    SUBJECTIVE / OVERNIGHT EVENTS: pt had good urine output overnight and this movement. Able to bend legs at knees b/l. Edema is improving     MEDICATIONS  (STANDING):  acetaminophen  IVPB .. 1000 milliGRAM(s) IV Intermittent once  albuterol/ipratropium for Nebulization 3 milliLiter(s) Nebulizer every 6 hours  apixaban 5 milliGRAM(s) Oral every 12 hours  ascorbic acid 500 milliGRAM(s) Oral daily  aspirin enteric coated 81 milliGRAM(s) Oral daily  atorvastatin 80 milliGRAM(s) Oral at bedtime  azithromycin   Tablet 250 milliGRAM(s) Oral <User Schedule>  Biotene Dry Mouth Oral Rinse 5 milliLiter(s) Swish and Spit two times a day  bisacodyl Suppository 10 milliGRAM(s) Rectal daily  budesonide 160 MICROgram(s)/formoterol 4.5 MICROgram(s) Inhaler 2 Puff(s) Inhalation two times a day  buMETAnide Infusion 0.5 mG/Hr (2.5 mL/Hr) IV Continuous <Continuous>  chlorhexidine 2% Cloths 1 Application(s) Topical daily  cholecalciferol 2000 Unit(s) Oral daily  dextrose 5%. 1000 milliLiter(s) (50 mL/Hr) IV Continuous <Continuous>  dextrose 50% Injectable 25 Gram(s) IV Push once  diltiazem    milliGRAM(s) Oral daily  ferrous    sulfate 325 milliGRAM(s) Oral two times a day  insulin glargine Injectable (LANTUS) 12 Unit(s) SubCutaneous at bedtime  insulin lispro (HumaLOG) corrective regimen sliding scale   SubCutaneous three times a day before meals  insulin lispro (HumaLOG) corrective regimen sliding scale   SubCutaneous at bedtime  insulin lispro Injectable (HumaLOG) 2 Unit(s) SubCutaneous three times a day with meals  lactulose Syrup 15 Gram(s) Oral two times a day  montelukast 10 milliGRAM(s) Oral daily  multivitamin 1 Tablet(s) Oral daily  mupirocin 2% Ointment 1 Application(s) Topical <User Schedule>  pantoprazole    Tablet 40 milliGRAM(s) Oral before breakfast  polyethylene glycol 3350 17 Gram(s) Oral daily  senna 2 Tablet(s) Oral at bedtime  theophylline ER (24 Hour) 400 milliGRAM(s) Oral daily  tiotropium 18 MICROgram(s) Capsule 1 Capsule(s) Inhalation daily    MEDICATIONS  (PRN):  glucagon  Injectable 1 milliGRAM(s) IntraMuscular once PRN Glucose LESS THAN 70 milligrams/deciliter  guaiFENesin   Syrup  (Sugar-Free) 100 milliGRAM(s) Oral every 6 hours PRN Cough  oxyCODONE    IR 5 milliGRAM(s) Oral every 6 hours PRN Severe Pain (7 - 10)      Vital Signs Last 24 Hrs  T(C): 36.7 (07 Sep 2020 23:35), Max: 36.7 (07 Sep 2020 23:35)  T(F): 98.1 (07 Sep 2020 23:35), Max: 98.1 (07 Sep 2020 23:35)  HR: 84 (08 Sep 2020 05:59) (75 - 87)  BP: 123/77 (08 Sep 2020 05:59) (123/77 - 146/73)  BP(mean): --  RR: 18 (08 Sep 2020 05:59) (18 - 18)  SpO2: 96% (08 Sep 2020 05:59) (96% - 100%)  CAPILLARY BLOOD GLUCOSE      POCT Blood Glucose.: 151 mg/dL (08 Sep 2020 10:45)  POCT Blood Glucose.: 122 mg/dL (08 Sep 2020 07:47)  POCT Blood Glucose.: 265 mg/dL (07 Sep 2020 23:01)  POCT Blood Glucose.: 164 mg/dL (07 Sep 2020 18:09)  POCT Blood Glucose.: 188 mg/dL (07 Sep 2020 14:00)    I&O's Summary    07 Sep 2020 07:01  -  08 Sep 2020 07:00  --------------------------------------------------------  IN: 1020 mL / OUT: 3750 mL / NET: -2730 mL    08 Sep 2020 07:01  -  08 Sep 2020 11:12  --------------------------------------------------------  IN: 0 mL / OUT: 700 mL / NET: -700 mL    PHYSICAL EXAM:  GENERAL: NAD  EYES:  conjunctiva and sclera clear  CHEST/LUNG: no whee       ABDOMEN: Soft, Nontender, Nondistended; Bowel sounds present  EXTREMITIES:  2+ Peripheral Pulses, No clubbing, cyanosis, or edema  PSYCH: AAOx3  NEUROLOGY: non-focal  SKIN: No rashes or lesions    LABS:                        8.4    7.62  )-----------( 519      ( 08 Sep 2020 09:40 )             29.5     09-08    140  |  90<L>  |  20  ----------------------------<  153<H>  3.5   |  43<H>  |  0.98    Ca    9.5      08 Sep 2020 09:40  Phos  4.0     09-07  Mg     2.2     09-07                RADIOLOGY & ADDITIONAL TESTS:    Imaging Personally Reviewed:    Consultant(s) Notes Reviewed:      Care Discussed with Consultants/Other Providers: Patient is a 60y old  Female who presents with a chief complaint of 60F p/w generalized weakness and difficulty walking (07 Sep 2020 14:06)    SUBJECTIVE / OVERNIGHT EVENTS: pt had good urine output overnight and this movement. Able to bend legs at knees b/l. Edema is improving     MEDICATIONS  (STANDING):  acetaminophen  IVPB .. 1000 milliGRAM(s) IV Intermittent once  albuterol/ipratropium for Nebulization 3 milliLiter(s) Nebulizer every 6 hours  apixaban 5 milliGRAM(s) Oral every 12 hours  ascorbic acid 500 milliGRAM(s) Oral daily  aspirin enteric coated 81 milliGRAM(s) Oral daily  atorvastatin 80 milliGRAM(s) Oral at bedtime  azithromycin   Tablet 250 milliGRAM(s) Oral <User Schedule>  Biotene Dry Mouth Oral Rinse 5 milliLiter(s) Swish and Spit two times a day  bisacodyl Suppository 10 milliGRAM(s) Rectal daily  budesonide 160 MICROgram(s)/formoterol 4.5 MICROgram(s) Inhaler 2 Puff(s) Inhalation two times a day  buMETAnide Infusion 0.5 mG/Hr (2.5 mL/Hr) IV Continuous <Continuous>  chlorhexidine 2% Cloths 1 Application(s) Topical daily  cholecalciferol 2000 Unit(s) Oral daily  dextrose 5%. 1000 milliLiter(s) (50 mL/Hr) IV Continuous <Continuous>  dextrose 50% Injectable 25 Gram(s) IV Push once  diltiazem    milliGRAM(s) Oral daily  ferrous    sulfate 325 milliGRAM(s) Oral two times a day  insulin glargine Injectable (LANTUS) 12 Unit(s) SubCutaneous at bedtime  insulin lispro (HumaLOG) corrective regimen sliding scale   SubCutaneous three times a day before meals  insulin lispro (HumaLOG) corrective regimen sliding scale   SubCutaneous at bedtime  insulin lispro Injectable (HumaLOG) 2 Unit(s) SubCutaneous three times a day with meals  lactulose Syrup 15 Gram(s) Oral two times a day  montelukast 10 milliGRAM(s) Oral daily  multivitamin 1 Tablet(s) Oral daily  mupirocin 2% Ointment 1 Application(s) Topical <User Schedule>  pantoprazole    Tablet 40 milliGRAM(s) Oral before breakfast  polyethylene glycol 3350 17 Gram(s) Oral daily  senna 2 Tablet(s) Oral at bedtime  theophylline ER (24 Hour) 400 milliGRAM(s) Oral daily  tiotropium 18 MICROgram(s) Capsule 1 Capsule(s) Inhalation daily    MEDICATIONS  (PRN):  glucagon  Injectable 1 milliGRAM(s) IntraMuscular once PRN Glucose LESS THAN 70 milligrams/deciliter  guaiFENesin   Syrup  (Sugar-Free) 100 milliGRAM(s) Oral every 6 hours PRN Cough  oxyCODONE    IR 5 milliGRAM(s) Oral every 6 hours PRN Severe Pain (7 - 10)      Vital Signs Last 24 Hrs  T(C): 36.7 (07 Sep 2020 23:35), Max: 36.7 (07 Sep 2020 23:35)  T(F): 98.1 (07 Sep 2020 23:35), Max: 98.1 (07 Sep 2020 23:35)  HR: 84 (08 Sep 2020 05:59) (75 - 87)  BP: 123/77 (08 Sep 2020 05:59) (123/77 - 146/73)  BP(mean): --  RR: 18 (08 Sep 2020 05:59) (18 - 18)  SpO2: 96% (08 Sep 2020 05:59) (96% - 100%)  CAPILLARY BLOOD GLUCOSE      POCT Blood Glucose.: 151 mg/dL (08 Sep 2020 10:45)  POCT Blood Glucose.: 122 mg/dL (08 Sep 2020 07:47)  POCT Blood Glucose.: 265 mg/dL (07 Sep 2020 23:01)  POCT Blood Glucose.: 164 mg/dL (07 Sep 2020 18:09)  POCT Blood Glucose.: 188 mg/dL (07 Sep 2020 14:00)    I&O's Summary    07 Sep 2020 07:01  -  08 Sep 2020 07:00  --------------------------------------------------------  IN: 1020 mL / OUT: 3750 mL / NET: -2730 mL    08 Sep 2020 07:01  -  08 Sep 2020 11:12  --------------------------------------------------------  IN: 0 mL / OUT: 700 mL / NET: -700 mL    PHYSICAL EXAM:  GENERAL: NAD  EYES:  conjunctiva and sclera clear  CHEST/LUNG: no wheezing or crackles  HEART: +S1/S2, reg   ABDOMEN: Soft, Nontender, Nondistended  EXTREMITIES:  LE edema improving, +pitting dependent edema in the posterior thighs and hips improving   PSYCH: AAOx3      LABS:                        8.4    7.62  )-----------( 519      ( 08 Sep 2020 09:40 )             29.5     09-08    140  |  90<L>  |  20  ----------------------------<  153<H>  3.5   |  43<H>  |  0.98    Ca    9.5      08 Sep 2020 09:40  Phos  4.0     09-07  Mg     2.2     09-07

## 2020-09-08 NOTE — PROGRESS NOTE ADULT - ASSESSMENT
60F w/ end stage COPD on 3LNC (pending transplant list at [facility]), former smoker, CAD s/p stent (2016), afib on eliquis, uncontrolled DM2, raynaud phenomenon and recent hospitalization at [facility] for altered mental status and A[facility]A presents to [facility] for evaluation of generalized weakness and difficulty walking admit to medicine for further evaluation.    Copd exacerbation: with hypoxic and hypercarbic resp failure  CAD: s/p stent  a Fibrillation  uncontrolled dm   Raynaud's PHENOMENON  [place]    She is on appropriate rx of copd at this time including theophylline  would check its levels :  Cont BD:   off steroids at this time:  No pulm htn pn ECHO:  She is already on full dose AC for a fibrillation:  She is on bipap at 15 /5: abg NOTED:  hco3 is creeping  up: need to be watched may need diamox: ph is 7.40  can she be transferred to [facility]: she did go there before and was supposed to finish few tests prior to that   She is on IV Bumex:   jere brother and team     9/4:  her hco3 is increasing:   give 250 mg of Diamox today :  this may have to be repeated every day for next few days depending upon her hco3:  she is being agressively diuresed:  and her leg edema is much better:  for now to cont curretn rx:  Her theophylline levels are fine:  JERE PMD       9/8:  clinically she is doing better:  her hco3 has slowly started creeping up again:  do ABG in AM: will repeat diamox tomorrow depending upon his hco3 as well as ph   CONT DIURESIS  she kerr feel better clinically   her leg edema is better then before

## 2020-09-08 NOTE — PROGRESS NOTE ADULT - ASSESSMENT
60F w COPD on 3LNC (?pending transplant list at [facility]), former smoker, CAD s/p stent (2016), afib on eliquis, uncontrolled DM2, raynaud phenomenon and recent hospitalization at [facility] for altered mental status and AUR[facility]A aw COPD exacerbation, LE swelling, weakness.     Prolonged hospitalization.

## 2020-09-09 LAB
ANION GAP SERPL CALC-SCNC: 6 MMOL/L — SIGNIFICANT CHANGE UP (ref 5–17)
BUN SERPL-MCNC: 23 MG/DL — SIGNIFICANT CHANGE UP (ref 7–23)
CALCIUM SERPL-MCNC: 9.8 MG/DL — SIGNIFICANT CHANGE UP (ref 8.4–10.5)
CHLORIDE SERPL-SCNC: 91 MMOL/L — LOW (ref 96–108)
CO2 SERPL-SCNC: 43 MMOL/L — HIGH (ref 22–31)
CREAT SERPL-MCNC: 1.23 MG/DL — SIGNIFICANT CHANGE UP (ref 0.5–1.3)
GAS PNL BLDA: SIGNIFICANT CHANGE UP
GLUCOSE BLDC GLUCOMTR-MCNC: 112 MG/DL — HIGH (ref 70–99)
GLUCOSE BLDC GLUCOMTR-MCNC: 154 MG/DL — HIGH (ref 70–99)
GLUCOSE BLDC GLUCOMTR-MCNC: 204 MG/DL — HIGH (ref 70–99)
GLUCOSE BLDC GLUCOMTR-MCNC: 204 MG/DL — HIGH (ref 70–99)
GLUCOSE SERPL-MCNC: 158 MG/DL — HIGH (ref 70–99)
HCT VFR BLD CALC: 30.1 % — LOW (ref 34.5–45)
HGB BLD-MCNC: 8.5 G/DL — LOW (ref 11.5–15.5)
MCHC RBC-ENTMCNC: 24.6 PG — LOW (ref 27–34)
MCHC RBC-ENTMCNC: 28.2 GM/DL — LOW (ref 32–36)
MCV RBC AUTO: 87.2 FL — SIGNIFICANT CHANGE UP (ref 80–100)
NRBC # BLD: 0 /100 WBCS — SIGNIFICANT CHANGE UP (ref 0–0)
PLATELET # BLD AUTO: 620 K/UL — HIGH (ref 150–400)
POTASSIUM SERPL-MCNC: 3.7 MMOL/L — SIGNIFICANT CHANGE UP (ref 3.5–5.3)
POTASSIUM SERPL-SCNC: 3.7 MMOL/L — SIGNIFICANT CHANGE UP (ref 3.5–5.3)
RBC # BLD: 3.45 M/UL — LOW (ref 3.8–5.2)
RBC # FLD: 18.5 % — HIGH (ref 10.3–14.5)
SODIUM SERPL-SCNC: 142 MMOL/L — SIGNIFICANT CHANGE UP (ref 135–145)
WBC # BLD: 7.57 K/UL — SIGNIFICANT CHANGE UP (ref 3.8–10.5)
WBC # FLD AUTO: 7.57 K/UL — SIGNIFICANT CHANGE UP (ref 3.8–10.5)

## 2020-09-09 PROCEDURE — 99233 SBSQ HOSP IP/OBS HIGH 50: CPT

## 2020-09-09 RX ADMIN — Medication 325 MILLIGRAM(S): at 17:56

## 2020-09-09 RX ADMIN — Medication 2: at 18:33

## 2020-09-09 RX ADMIN — Medication 325 MILLIGRAM(S): at 05:49

## 2020-09-09 RX ADMIN — Medication 1 TABLET(S): at 13:43

## 2020-09-09 RX ADMIN — APIXABAN 5 MILLIGRAM(S): 2.5 TABLET, FILM COATED ORAL at 05:49

## 2020-09-09 RX ADMIN — Medication 2 UNIT(S): at 09:33

## 2020-09-09 RX ADMIN — Medication 2: at 13:41

## 2020-09-09 RX ADMIN — BUDESONIDE AND FORMOTEROL FUMARATE DIHYDRATE 2 PUFF(S): 160; 4.5 AEROSOL RESPIRATORY (INHALATION) at 17:55

## 2020-09-09 RX ADMIN — TIOTROPIUM BROMIDE 1 CAPSULE(S): 18 CAPSULE ORAL; RESPIRATORY (INHALATION) at 13:43

## 2020-09-09 RX ADMIN — Medication 1: at 09:33

## 2020-09-09 RX ADMIN — Medication 2000 UNIT(S): at 13:46

## 2020-09-09 RX ADMIN — Medication 2 UNIT(S): at 18:34

## 2020-09-09 RX ADMIN — Medication 400 MILLIGRAM(S): at 13:43

## 2020-09-09 RX ADMIN — INSULIN GLARGINE 12 UNIT(S): 100 INJECTION, SOLUTION SUBCUTANEOUS at 22:13

## 2020-09-09 RX ADMIN — APIXABAN 5 MILLIGRAM(S): 2.5 TABLET, FILM COATED ORAL at 17:56

## 2020-09-09 RX ADMIN — MONTELUKAST 10 MILLIGRAM(S): 4 TABLET, CHEWABLE ORAL at 13:45

## 2020-09-09 RX ADMIN — BUDESONIDE AND FORMOTEROL FUMARATE DIHYDRATE 2 PUFF(S): 160; 4.5 AEROSOL RESPIRATORY (INHALATION) at 05:50

## 2020-09-09 RX ADMIN — Medication 81 MILLIGRAM(S): at 13:45

## 2020-09-09 RX ADMIN — Medication 500 MILLIGRAM(S): at 13:44

## 2020-09-09 RX ADMIN — CHLORHEXIDINE GLUCONATE 1 APPLICATION(S): 213 SOLUTION TOPICAL at 13:46

## 2020-09-09 RX ADMIN — Medication 5 MILLILITER(S): at 05:49

## 2020-09-09 RX ADMIN — Medication 2 UNIT(S): at 13:41

## 2020-09-09 RX ADMIN — Medication 3 MILLILITER(S): at 05:50

## 2020-09-09 RX ADMIN — AZITHROMYCIN 250 MILLIGRAM(S): 500 TABLET, FILM COATED ORAL at 22:12

## 2020-09-09 RX ADMIN — SENNA PLUS 2 TABLET(S): 8.6 TABLET ORAL at 22:13

## 2020-09-09 RX ADMIN — Medication 3 MILLILITER(S): at 22:12

## 2020-09-09 RX ADMIN — PANTOPRAZOLE SODIUM 40 MILLIGRAM(S): 20 TABLET, DELAYED RELEASE ORAL at 05:49

## 2020-09-09 RX ADMIN — Medication 180 MILLIGRAM(S): at 05:50

## 2020-09-09 RX ADMIN — Medication 5 MILLILITER(S): at 17:55

## 2020-09-09 RX ADMIN — POLYETHYLENE GLYCOL 3350 17 GRAM(S): 17 POWDER, FOR SOLUTION ORAL at 13:45

## 2020-09-09 RX ADMIN — ATORVASTATIN CALCIUM 80 MILLIGRAM(S): 80 TABLET, FILM COATED ORAL at 22:13

## 2020-09-09 RX ADMIN — Medication 3 MILLILITER(S): at 13:45

## 2020-09-09 NOTE — PROGRESS NOTE ADULT - PROBLEM SELECTOR PLAN 2
- C/w bumex gtt at 0.5mg/hr, good urine output  - Monitor BMP, Mg, Phos Q12 hrs and replete as needed   - Diamox on hold for now. No diamox today as per pulm.   - Strict ins and outs, f/u bladder scan today   - C/w diuresis as per cardiology, will discuss when to titrate down and stop gtt   - LE edema is improving

## 2020-09-09 NOTE — PROGRESS NOTE ADULT - PROBLEM SELECTOR PLAN 1
- Dyspnea multifactorial in the setting of underlying lung disease, cardiovascular   dysfunction, obesity, and deconditioning.   - Was given prolonged course of Prednisone, now tapered off. Overnight and prn   Bipap. Pt comfortable off Bipap and able to speak in full sentences.   - cont azithro, duonebs, spiriva, symbicort, singulair  - Continue supplemental O2 with goal O2 sat > 88% - she does not need to be at   100%.  - Theophiline at 3.2.  -as per discussion with Dr. Mathew, pt was getting evaluated at [facility] by Dr. Cervantes for lung transplant. However she is NOT listed currently to receive a transplant.  -acidemia on ABG, discussed with pulm attending, pt to wear BiPAP throughout the day today. She was off BIPAP for the majority of the day yesterday. Pt agreeable to paln.

## 2020-09-09 NOTE — PROGRESS NOTE ADULT - ASSESSMENT
60F w/ end stage COPD on 3LNC (pending transplant list at [facility]), former smoker, CAD s/p stent (2016), afib on eliquis, uncontrolled DM2, raynaud phenomenon and recent hospitalization at [facility] for altered mental status and [facility] presents to [facility] for evaluation of generalized weakness and difficulty walking admit to medicine for further evaluation.    Copd exacerbation: with hypoxic and hypercarbic resp failure  CAD: s/p stent  a Fibrillation  uncontrolled dm   Raynaud's PHENOMENON  aurora    She is on appropriate rx of copd at this time including theophylline  would check its levels :  Cont BD:   off steroids at this time:  No pulm htn pn ECHO:  She is already on full dose AC for a fibrillation:  She is on bipap at 15 /5: abg NOTED:  hco3 is creeping  up: need to be watched may need diamox: ph is 7.40  can she be transferred to [facility]: she did go there before and was supposed to finish few tests prior to that   She is on IV Bumex:   jere brother and team     9/4:  her hco3 is increasing:   give 250 mg of Diamox today :  this may have to be repeated every day for next few days depending upon her hco3:  she is being agressively diuresed:  and her leg edema is much better:  for now to cont curretn rx:  Her theophylline levels are fine:  JERE PMD       9/8:  clinically she is doing better:  her hco3 has slowly started creeping up again:  do ABG in AM: will repeat diamox tomorrow depending upon his hco3 as well as ph   CONT DIURESIS  she kerr feel better clinically   her leg edema is better then before     9/9:  ac on chr hypercarbic resp failure:  HCO3 is high but NO DIAMOX:  Cont diureses;  she needs to use more of bipap in daytime as wellas full ti me at night ti me:  Likely more hypercarbic secondary to not using bipap yesterday n the aytime: encouraged her to use bipap more in the daytime today too:  overallprognosis is poor:  jere NP

## 2020-09-09 NOTE — PROGRESS NOTE ADULT - SUBJECTIVE AND OBJECTIVE BOX
Patient is a 60y old  Female who presents with a chief complaint of 60F p/w generalized weakness and difficulty walking (09 Sep 2020 12:16)      SUBJECTIVE / OVERNIGHT EVENTS: no acute events overnight     MEDICATIONS  (STANDING):  acetaminophen  IVPB .. 1000 milliGRAM(s) IV Intermittent once  albuterol/ipratropium for Nebulization 3 milliLiter(s) Nebulizer every 6 hours  apixaban 5 milliGRAM(s) Oral every 12 hours  ascorbic acid 500 milliGRAM(s) Oral daily  aspirin enteric coated 81 milliGRAM(s) Oral daily  atorvastatin 80 milliGRAM(s) Oral at bedtime  azithromycin   Tablet 250 milliGRAM(s) Oral <User Schedule>  Biotene Dry Mouth Oral Rinse 5 milliLiter(s) Swish and Spit two times a day  bisacodyl Suppository 10 milliGRAM(s) Rectal daily  budesonide 160 MICROgram(s)/formoterol 4.5 MICROgram(s) Inhaler 2 Puff(s) Inhalation two times a day  buMETAnide Infusion 0.5 mG/Hr (2.5 mL/Hr) IV Continuous <Continuous>  chlorhexidine 2% Cloths 1 Application(s) Topical daily  cholecalciferol 2000 Unit(s) Oral daily  dextrose 5%. 1000 milliLiter(s) (50 mL/Hr) IV Continuous <Continuous>  dextrose 50% Injectable 25 Gram(s) IV Push once  diltiazem    milliGRAM(s) Oral daily  ferrous    sulfate 325 milliGRAM(s) Oral two times a day  insulin glargine Injectable (LANTUS) 12 Unit(s) SubCutaneous at bedtime  insulin lispro (HumaLOG) corrective regimen sliding scale   SubCutaneous three times a day before meals  insulin lispro (HumaLOG) corrective regimen sliding scale   SubCutaneous at bedtime  insulin lispro Injectable (HumaLOG) 2 Unit(s) SubCutaneous three times a day with meals  lactulose Syrup 15 Gram(s) Oral two times a day  montelukast 10 milliGRAM(s) Oral daily  multivitamin 1 Tablet(s) Oral daily  mupirocin 2% Ointment 1 Application(s) Topical <User Schedule>  pantoprazole    Tablet 40 milliGRAM(s) Oral before breakfast  polyethylene glycol 3350 17 Gram(s) Oral daily  senna 2 Tablet(s) Oral at bedtime  theophylline ER (24 Hour) 400 milliGRAM(s) Oral daily  tiotropium 18 MICROgram(s) Capsule 1 Capsule(s) Inhalation daily    MEDICATIONS  (PRN):  glucagon  Injectable 1 milliGRAM(s) IntraMuscular once PRN Glucose LESS THAN 70 milligrams/deciliter  guaiFENesin   Syrup  (Sugar-Free) 100 milliGRAM(s) Oral every 6 hours PRN Cough  oxyCODONE    IR 5 milliGRAM(s) Oral every 6 hours PRN Severe Pain (7 - 10)      Vital Signs Last 24 Hrs  T(C): 36.9 (09 Sep 2020 08:06), Max: 36.9 (09 Sep 2020 08:06)  T(F): 98.4 (09 Sep 2020 08:06), Max: 98.4 (09 Sep 2020 08:06)  HR: 87 (09 Sep 2020 10:20) (82 - 90)  BP: 138/83 (09 Sep 2020 08:06) (129/71 - 138/83)  BP(mean): --  RR: 18 (09 Sep 2020 08:06) (18 - 18)  SpO2: 100% (09 Sep 2020 10:20) (97% - 100%)  CAPILLARY BLOOD GLUCOSE      POCT Blood Glucose.: 154 mg/dL (09 Sep 2020 08:51)  POCT Blood Glucose.: 160 mg/dL (08 Sep 2020 21:38)  POCT Blood Glucose.: 192 mg/dL (08 Sep 2020 20:03)    I&O's Summary    08 Sep 2020 07:01  -  09 Sep 2020 07:00  --------------------------------------------------------  IN: 570 mL / OUT: 2150 mL / NET: -1580 mL    09 Sep 2020 07:01  -  09 Sep 2020 12:25  --------------------------------------------------------  IN: 240 mL / OUT: 900 mL / NET: -660 mL    PHYSICAL EXAM:  GENERAL: NAD  EYES: conjunctiva and sclera clear  CHEST/LUNG: Clear to auscultation bilaterally; No wheeze  HEART: +S1/S2, reg   ABDOMEN: Soft, Nontender, Nondistended  EXTREMITIES: +1 pitting edema of the LE b/l   PSYCH: AAOx3    LABS:                        8.5    7.57  )-----------( 620      ( 09 Sep 2020 08:53 )             30.1     09-09    142  |  91<L>  |  23  ----------------------------<  158<H>  3.7   |  43<H>  |  1.23    Ca    9.8      09 Sep 2020 08:53        Care Discussed with Consultants/Other Providers: Dr. Coyle regarding acidemia

## 2020-09-09 NOTE — PROGRESS NOTE ADULT - SUBJECTIVE AND OBJECTIVE BOX
Patient is a 60y old  Female who presents with a chief complaint of 60F p/w generalized weakness and difficulty walking (08 Sep 2020 16:55)      Any change in ROS: Doingok : alert and awake:  feels SOBo ften: Off bipap :  didnto use bipap mostly yesterday:     MEDICATIONS  (STANDING):  acetaminophen  IVPB .. 1000 milliGRAM(s) IV Intermittent once  albuterol/ipratropium for Nebulization 3 milliLiter(s) Nebulizer every 6 hours  apixaban 5 milliGRAM(s) Oral every 12 hours  ascorbic acid 500 milliGRAM(s) Oral daily  aspirin enteric coated 81 milliGRAM(s) Oral daily  atorvastatin 80 milliGRAM(s) Oral at bedtime  azithromycin   Tablet 250 milliGRAM(s) Oral <User Schedule>  Biotene Dry Mouth Oral Rinse 5 milliLiter(s) Swish and Spit two times a day  bisacodyl Suppository 10 milliGRAM(s) Rectal daily  budesonide 160 MICROgram(s)/formoterol 4.5 MICROgram(s) Inhaler 2 Puff(s) Inhalation two times a day  buMETAnide Infusion 0.5 mG/Hr (2.5 mL/Hr) IV Continuous <Continuous>  chlorhexidine 2% Cloths 1 Application(s) Topical daily  cholecalciferol 2000 Unit(s) Oral daily  dextrose 5%. 1000 milliLiter(s) (50 mL/Hr) IV Continuous <Continuous>  dextrose 50% Injectable 25 Gram(s) IV Push once  diltiazem    milliGRAM(s) Oral daily  ferrous    sulfate 325 milliGRAM(s) Oral two times a day  insulin glargine Injectable (LANTUS) 12 Unit(s) SubCutaneous at bedtime  insulin lispro (HumaLOG) corrective regimen sliding scale   SubCutaneous three times a day before meals  insulin lispro (HumaLOG) corrective regimen sliding scale   SubCutaneous at bedtime  insulin lispro Injectable (HumaLOG) 2 Unit(s) SubCutaneous three times a day with meals  lactulose Syrup 15 Gram(s) Oral two times a day  montelukast 10 milliGRAM(s) Oral daily  multivitamin 1 Tablet(s) Oral daily  mupirocin 2% Ointment 1 Application(s) Topical <User Schedule>  pantoprazole    Tablet 40 milliGRAM(s) Oral before breakfast  polyethylene glycol 3350 17 Gram(s) Oral daily  senna 2 Tablet(s) Oral at bedtime  theophylline ER (24 Hour) 400 milliGRAM(s) Oral daily  tiotropium 18 MICROgram(s) Capsule 1 Capsule(s) Inhalation daily    MEDICATIONS  (PRN):  glucagon  Injectable 1 milliGRAM(s) IntraMuscular once PRN Glucose LESS THAN 70 milligrams/deciliter  guaiFENesin   Syrup  (Sugar-Free) 100 milliGRAM(s) Oral every 6 hours PRN Cough  oxyCODONE    IR 5 milliGRAM(s) Oral every 6 hours PRN Severe Pain (7 - 10)    Vital Signs Last 24 Hrs  T(C): 36.9 (09 Sep 2020 08:06), Max: 36.9 (09 Sep 2020 08:06)  T(F): 98.4 (09 Sep 2020 08:06), Max: 98.4 (09 Sep 2020 08:06)  HR: 87 (09 Sep 2020 10:20) (82 - 90)  BP: 138/83 (09 Sep 2020 08:06) (129/71 - 138/83)  BP(mean): --  RR: 18 (09 Sep 2020 08:06) (18 - 18)  SpO2: 100% (09 Sep 2020 10:20) (97% - 100%)    I&O's Summary    08 Sep 2020 07:01  -  09 Sep 2020 07:00  --------------------------------------------------------  IN: 570 mL / OUT: 2150 mL / NET: -1580 mL    09 Sep 2020 07:01  -  09 Sep 2020 12:17  --------------------------------------------------------  IN: 240 mL / OUT: 900 mL / NET: -660 mL          Physical Exam:   GENERAL: NAD, well-groomed, well-developed  HEENT: MITCH/   Atraumatic, Normocephalic  ENMT: No tonsillar erythema, exudates, or enlargement; Moist mucous membranes, Good dentition, No lesions  NECK: Supple, No JVD, Normal thyroid  CHEST/LUNG: Clear to auscultaion, ; No rales, rhonchi, wheezing, or rubs  CVS: Regular rate and rhythm; No murmurs, rubs, or gallops  GI: : Soft, Nontender, Nondistended; Bowel sounds present  NERVOUS SYSTEM:  Alert & Oriented X3  EXTREMITIES:  +edema  LYMPH: No lymphadenopathy noted  SKIN: No rashes or lesions  ENDOCRINOLOGY: No Thyromegaly  PSYCH: Appropriate    Labs:  ABG - ( 09 Sep 2020 08:57 )  pH, Arterial: 7.30  pH, Blood: x     /  pCO2: 100   /  pO2: 41    / HCO3: 47    / Base Excess: 18.1  /  SaO2: 66                                          8.5    7.57  )-----------( 620      ( 09 Sep 2020 08:53 )             30.1                         8.4    7.62  )-----------( 519      ( 08 Sep 2020 09:40 )             29.5                         8.3    8.34  )-----------( 525      ( 07 Sep 2020 10:50 )             29.2                         8.3    8.92  )-----------( 428      ( 06 Sep 2020 09:54 )             29.1     09-09    142  |  91<L>  |  23  ----------------------------<  158<H>  3.7   |  43<H>  |  1.23  09-08    140  |  90<L>  |  20  ----------------------------<  153<H>  3.5   |  43<H>  |  0.98  09-07    137  |  88<L>  |  26<H>  ----------------------------<  125<H>  3.8   |  38<H>  |  1.10  09-06    139  |  87<L>  |  24<H>  ----------------------------<  167<H>  3.5   |  42<H>  |  1.19    Ca    9.8      09 Sep 2020 08:53  Ca    9.5      08 Sep 2020 09:40      CAPILLARY BLOOD GLUCOSE      POCT Blood Glucose.: 154 mg/dL (09 Sep 2020 08:51)  POCT Blood Glucose.: 160 mg/dL (08 Sep 2020 21:38)  POCT Blood Glucose.: 192 mg/dL (08 Sep 2020 20:03)      < from: Xray Chest 1 View- PORTABLE-Routine (08.01.20 @ 16:44) >    EXAM:  XR CHEST PORTABLE ROUTINE 1V                            PROCEDURE DATE:  08/01/2020            INTERPRETATION:  CLINICAL INFORMATION: Screening, history COPD.    TECHNIQUE: Single portable view of the chest.    COMPARISON: 12/11/2019.      IMPRESSION:    The lungs are clear.  Heart size cannot be accurately assessed in this projection.  No acute bone abnormality.              GOVIND SIMMONS M.D., RESIDENT RADIOLOGIST  This document has been electronically signed.  CHELSEY SMITH M.D., ATTENDING RADIOLOGIST  This document has been electronically signed. Aug  2 2020  9:05AM              < end of copied text >              RECENT CULTURES:        RESPIRATORY CULTURES:          Studies  Chest X-RAY  CT SCAN Chest   Venous Dopplers: LE:   CT Abdomen  Others

## 2020-09-09 NOTE — PROGRESS NOTE ADULT - SUBJECTIVE AND OBJECTIVE BOX
CC: no cp/sob    TELEMETRY:     PHYSICAL EXAM:    T(C): 36.9 (09-09-20 @ 08:06), Max: 36.9 (09-09-20 @ 08:06)  HR: 87 (09-09-20 @ 10:20) (82 - 90)  BP: 138/83 (09-09-20 @ 08:06) (129/71 - 138/83)  RR: 18 (09-09-20 @ 08:06) (18 - 18)  SpO2: 100% (09-09-20 @ 10:20) (97% - 100%)  Wt(kg): --  I&O's Summary    08 Sep 2020 07:01  -  09 Sep 2020 07:00  --------------------------------------------------------  IN: 570 mL / OUT: 2150 mL / NET: -1580 mL    09 Sep 2020 07:01  -  09 Sep 2020 14:21  --------------------------------------------------------  IN: 240 mL / OUT: 900 mL / NET: -660 mL        Appearance: Normal	  Cardiovascular: Normal S1 S2,RRR, No JVD, No murmurs  Respiratory: Lungs clear to auscultation	  Gastrointestinal:  Soft, Non-tender, + BS	  Extremities: Normal range of motion, No clubbing, cyanosis or edema  Vascular: Peripheral pulses palpable 2+ bilaterally     LABS:	 	                          8.5    7.57  )-----------( 620      ( 09 Sep 2020 08:53 )             30.1     09-09    142  |  91<L>  |  23  ----------------------------<  158<H>  3.7   |  43<H>  |  1.23    Ca    9.8      09 Sep 2020 08:53            CARDIAC MARKERS:

## 2020-09-09 NOTE — PROGRESS NOTE ADULT - ASSESSMENT
60F w COPD on 3LNC (?pending transplant list at [facility]), former smoker, CAD s/p stent (2016), afib on eliquis, uncontrolled DM2, raynaud phenomenon and recent hospitalization at [facility] for altered mental status and AURORA aw COPD exacerbation, LE swelling, weakness.     Prolonged hospitalization.

## 2020-09-10 LAB
ANION GAP SERPL CALC-SCNC: 10 MMOL/L — SIGNIFICANT CHANGE UP (ref 5–17)
BUN SERPL-MCNC: 25 MG/DL — HIGH (ref 7–23)
CALCIUM SERPL-MCNC: 10 MG/DL — SIGNIFICANT CHANGE UP (ref 8.4–10.5)
CHLORIDE SERPL-SCNC: 87 MMOL/L — LOW (ref 96–108)
CO2 SERPL-SCNC: 43 MMOL/L — HIGH (ref 22–31)
CREAT SERPL-MCNC: 1.07 MG/DL — SIGNIFICANT CHANGE UP (ref 0.5–1.3)
GAS PNL BLDA: SIGNIFICANT CHANGE UP
GLUCOSE BLDC GLUCOMTR-MCNC: 167 MG/DL — HIGH (ref 70–99)
GLUCOSE BLDC GLUCOMTR-MCNC: 170 MG/DL — HIGH (ref 70–99)
GLUCOSE BLDC GLUCOMTR-MCNC: 172 MG/DL — HIGH (ref 70–99)
GLUCOSE BLDC GLUCOMTR-MCNC: 248 MG/DL — HIGH (ref 70–99)
GLUCOSE SERPL-MCNC: 161 MG/DL — HIGH (ref 70–99)
HCT VFR BLD CALC: 30.9 % — LOW (ref 34.5–45)
HGB BLD-MCNC: 9 G/DL — LOW (ref 11.5–15.5)
MCHC RBC-ENTMCNC: 25.3 PG — LOW (ref 27–34)
MCHC RBC-ENTMCNC: 29.1 GM/DL — LOW (ref 32–36)
MCV RBC AUTO: 86.8 FL — SIGNIFICANT CHANGE UP (ref 80–100)
NRBC # BLD: 0 /100 WBCS — SIGNIFICANT CHANGE UP (ref 0–0)
PLATELET # BLD AUTO: 595 K/UL — HIGH (ref 150–400)
POTASSIUM SERPL-MCNC: 3.8 MMOL/L — SIGNIFICANT CHANGE UP (ref 3.5–5.3)
POTASSIUM SERPL-SCNC: 3.8 MMOL/L — SIGNIFICANT CHANGE UP (ref 3.5–5.3)
RBC # BLD: 3.56 M/UL — LOW (ref 3.8–5.2)
RBC # FLD: 18.2 % — HIGH (ref 10.3–14.5)
SODIUM SERPL-SCNC: 140 MMOL/L — SIGNIFICANT CHANGE UP (ref 135–145)
WBC # BLD: 9.63 K/UL — SIGNIFICANT CHANGE UP (ref 3.8–10.5)
WBC # FLD AUTO: 9.63 K/UL — SIGNIFICANT CHANGE UP (ref 3.8–10.5)

## 2020-09-10 PROCEDURE — 99233 SBSQ HOSP IP/OBS HIGH 50: CPT

## 2020-09-10 RX ORDER — ALPRAZOLAM 0.25 MG
0.25 TABLET ORAL ONCE
Refills: 0 | Status: DISCONTINUED | OUTPATIENT
Start: 2020-09-10 | End: 2020-09-10

## 2020-09-10 RX ADMIN — Medication 325 MILLIGRAM(S): at 05:59

## 2020-09-10 RX ADMIN — Medication 0.25 MILLIGRAM(S): at 23:52

## 2020-09-10 RX ADMIN — Medication 325 MILLIGRAM(S): at 17:43

## 2020-09-10 RX ADMIN — POLYETHYLENE GLYCOL 3350 17 GRAM(S): 17 POWDER, FOR SOLUTION ORAL at 13:27

## 2020-09-10 RX ADMIN — APIXABAN 5 MILLIGRAM(S): 2.5 TABLET, FILM COATED ORAL at 05:59

## 2020-09-10 RX ADMIN — LACTULOSE 15 GRAM(S): 10 SOLUTION ORAL at 10:32

## 2020-09-10 RX ADMIN — Medication 2000 UNIT(S): at 13:27

## 2020-09-10 RX ADMIN — Medication 81 MILLIGRAM(S): at 13:26

## 2020-09-10 RX ADMIN — Medication 3 MILLILITER(S): at 17:43

## 2020-09-10 RX ADMIN — Medication 500 MILLIGRAM(S): at 13:26

## 2020-09-10 RX ADMIN — Medication 3 MILLILITER(S): at 13:25

## 2020-09-10 RX ADMIN — Medication 400 MILLIGRAM(S): at 13:25

## 2020-09-10 RX ADMIN — Medication 180 MILLIGRAM(S): at 05:59

## 2020-09-10 RX ADMIN — TIOTROPIUM BROMIDE 1 CAPSULE(S): 18 CAPSULE ORAL; RESPIRATORY (INHALATION) at 13:26

## 2020-09-10 RX ADMIN — Medication 1 TABLET(S): at 13:25

## 2020-09-10 RX ADMIN — APIXABAN 5 MILLIGRAM(S): 2.5 TABLET, FILM COATED ORAL at 17:43

## 2020-09-10 RX ADMIN — Medication 5 MILLILITER(S): at 21:45

## 2020-09-10 RX ADMIN — Medication 2 UNIT(S): at 13:39

## 2020-09-10 RX ADMIN — Medication 1: at 13:39

## 2020-09-10 RX ADMIN — MONTELUKAST 10 MILLIGRAM(S): 4 TABLET, CHEWABLE ORAL at 13:26

## 2020-09-10 RX ADMIN — Medication 1: at 10:01

## 2020-09-10 RX ADMIN — CHLORHEXIDINE GLUCONATE 1 APPLICATION(S): 213 SOLUTION TOPICAL at 13:44

## 2020-09-10 RX ADMIN — INSULIN GLARGINE 12 UNIT(S): 100 INJECTION, SOLUTION SUBCUTANEOUS at 21:46

## 2020-09-10 RX ADMIN — SENNA PLUS 2 TABLET(S): 8.6 TABLET ORAL at 21:45

## 2020-09-10 RX ADMIN — BUDESONIDE AND FORMOTEROL FUMARATE DIHYDRATE 2 PUFF(S): 160; 4.5 AEROSOL RESPIRATORY (INHALATION) at 06:00

## 2020-09-10 RX ADMIN — ATORVASTATIN CALCIUM 80 MILLIGRAM(S): 80 TABLET, FILM COATED ORAL at 21:45

## 2020-09-10 RX ADMIN — BUMETANIDE 2.5 MG/HR: 0.25 INJECTION INTRAMUSCULAR; INTRAVENOUS at 10:02

## 2020-09-10 RX ADMIN — MUPIROCIN 1 APPLICATION(S): 20 OINTMENT TOPICAL at 10:31

## 2020-09-10 RX ADMIN — PANTOPRAZOLE SODIUM 40 MILLIGRAM(S): 20 TABLET, DELAYED RELEASE ORAL at 06:03

## 2020-09-10 RX ADMIN — Medication 2 UNIT(S): at 10:31

## 2020-09-10 RX ADMIN — Medication 3 MILLILITER(S): at 06:00

## 2020-09-10 RX ADMIN — OXYCODONE HYDROCHLORIDE 5 MILLIGRAM(S): 5 TABLET ORAL at 23:52

## 2020-09-10 RX ADMIN — Medication 5 MILLILITER(S): at 05:59

## 2020-09-10 RX ADMIN — BUDESONIDE AND FORMOTEROL FUMARATE DIHYDRATE 2 PUFF(S): 160; 4.5 AEROSOL RESPIRATORY (INHALATION) at 17:43

## 2020-09-10 RX ADMIN — Medication 1: at 20:28

## 2020-09-10 RX ADMIN — Medication 3 MILLILITER(S): at 23:43

## 2020-09-10 RX ADMIN — Medication 2 UNIT(S): at 20:28

## 2020-09-10 NOTE — PROVIDER CONTACT NOTE (CRITICAL VALUE NOTIFICATION) - ACTION/TREATMENT ORDERED:
no new orders
continue on BIPAP during day
iron and vitamin c po
no action
no action at this time
will monitor
continue with vit c and iron po
Will assess the pt
pt on bipap

## 2020-09-10 NOTE — PROGRESS NOTE ADULT - SUBJECTIVE AND OBJECTIVE BOX
CARDIOLOGY FOLLOW UP - Dr. Brunson    CC no cp  unchanged SOB        PHYSICAL EXAM:  T(C): 36.4 (09-10-20 @ 06:07), Max: 36.8 (09-09-20 @ 14:56)  HR: 86 (09-10-20 @ 09:01) (82 - 98)  BP: 148/74 (09-10-20 @ 06:07) (135/73 - 152/83)  RR: 18 (09-10-20 @ 06:07) (18 - 19)  SpO2: 99% (09-10-20 @ 09:01) (97% - 100%)  Wt(kg): --  I&O's Summary    09 Sep 2020 07:01  -  10 Sep 2020 07:00  --------------------------------------------------------  IN: 585 mL / OUT: 2000 mL / NET: -1415 mL    10 Sep 2020 07:01  -  10 Sep 2020 10:50  --------------------------------------------------------  IN: 120 mL / OUT: 0 mL / NET: 120 mL        Appearance: Normal	  Cardiovascular: Normal S1 S2,RRR, No JVD, No murmurs  Respiratory: diminished   Gastrointestinal:  Soft, Non-tender, + BS	  Extremities: Normal range of motion, No clubbing, b/l le edema - much improved         MEDICATIONS  (STANDING):  acetaminophen  IVPB .. 1000 milliGRAM(s) IV Intermittent once  albuterol/ipratropium for Nebulization 3 milliLiter(s) Nebulizer every 6 hours  apixaban 5 milliGRAM(s) Oral every 12 hours  ascorbic acid 500 milliGRAM(s) Oral daily  aspirin enteric coated 81 milliGRAM(s) Oral daily  atorvastatin 80 milliGRAM(s) Oral at bedtime  azithromycin   Tablet 250 milliGRAM(s) Oral <User Schedule>  Biotene Dry Mouth Oral Rinse 5 milliLiter(s) Swish and Spit two times a day  bisacodyl Suppository 10 milliGRAM(s) Rectal daily  budesonide 160 MICROgram(s)/formoterol 4.5 MICROgram(s) Inhaler 2 Puff(s) Inhalation two times a day  buMETAnide Infusion 0.5 mG/Hr (2.5 mL/Hr) IV Continuous <Continuous>  chlorhexidine 2% Cloths 1 Application(s) Topical daily  cholecalciferol 2000 Unit(s) Oral daily  dextrose 5%. 1000 milliLiter(s) (50 mL/Hr) IV Continuous <Continuous>  dextrose 50% Injectable 25 Gram(s) IV Push once  diltiazem    milliGRAM(s) Oral daily  ferrous    sulfate 325 milliGRAM(s) Oral two times a day  insulin glargine Injectable (LANTUS) 12 Unit(s) SubCutaneous at bedtime  insulin lispro (HumaLOG) corrective regimen sliding scale   SubCutaneous three times a day before meals  insulin lispro (HumaLOG) corrective regimen sliding scale   SubCutaneous at bedtime  insulin lispro Injectable (HumaLOG) 2 Unit(s) SubCutaneous three times a day with meals  lactulose Syrup 15 Gram(s) Oral two times a day  montelukast 10 milliGRAM(s) Oral daily  multivitamin 1 Tablet(s) Oral daily  mupirocin 2% Ointment 1 Application(s) Topical <User Schedule>  pantoprazole    Tablet 40 milliGRAM(s) Oral before breakfast  polyethylene glycol 3350 17 Gram(s) Oral daily  senna 2 Tablet(s) Oral at bedtime  theophylline ER (24 Hour) 400 milliGRAM(s) Oral daily  tiotropium 18 MICROgram(s) Capsule 1 Capsule(s) Inhalation daily      TELEMETRY: 	    ECG:  	  RADIOLOGY:   DIAGNOSTIC TESTING:  [ ] Echocardiogram:  [ ]  Catheterization:  [ ] Stress Test:    OTHER: 	    LABS:	 	                                9.0    9.63  )-----------( 595      ( 10 Sep 2020 08:49 )             30.9     09-10    140  |  87<L>  |  25<H>  ----------------------------<  161<H>  3.8   |  43<H>  |  1.07    Ca    10.0      10 Sep 2020 08:49

## 2020-09-10 NOTE — PROGRESS NOTE ADULT - ATTENDING COMMENTS
Agree with above NP note.  cv stable and improved  cont bumex as ordered  eventual change back to po

## 2020-09-10 NOTE — PROVIDER CONTACT NOTE (CRITICAL VALUE NOTIFICATION) - TEST AND RESULT REPORTED:
CO2 45
H&H: 7.0/24.7
H/H 6.9 24.5
Hemoglobin 6.7
PCO2 74
PCO2 78/ P02 46
PCO2 87
Pco2 100 , Po2 41
co2 45
hb 6.8/Hct 23.9

## 2020-09-10 NOTE — PROGRESS NOTE ADULT - SUBJECTIVE AND OBJECTIVE BOX
Patient is a 60y old  Female who presents with a chief complaint of 60F p/w generalized weakness and difficulty walking (10 Sep 2020 10:50)      Any change in ROS: Doing same:  gets SOB on taking off bipap    MEDICATIONS  (STANDING):  acetaminophen  IVPB .. 1000 milliGRAM(s) IV Intermittent once  albuterol/ipratropium for Nebulization 3 milliLiter(s) Nebulizer every 6 hours  apixaban 5 milliGRAM(s) Oral every 12 hours  ascorbic acid 500 milliGRAM(s) Oral daily  aspirin enteric coated 81 milliGRAM(s) Oral daily  atorvastatin 80 milliGRAM(s) Oral at bedtime  azithromycin   Tablet 250 milliGRAM(s) Oral <User Schedule>  Biotene Dry Mouth Oral Rinse 5 milliLiter(s) Swish and Spit two times a day  bisacodyl Suppository 10 milliGRAM(s) Rectal daily  budesonide 160 MICROgram(s)/formoterol 4.5 MICROgram(s) Inhaler 2 Puff(s) Inhalation two times a day  buMETAnide Infusion 0.5 mG/Hr (2.5 mL/Hr) IV Continuous <Continuous>  chlorhexidine 2% Cloths 1 Application(s) Topical daily  cholecalciferol 2000 Unit(s) Oral daily  dextrose 5%. 1000 milliLiter(s) (50 mL/Hr) IV Continuous <Continuous>  dextrose 50% Injectable 25 Gram(s) IV Push once  diltiazem    milliGRAM(s) Oral daily  ferrous    sulfate 325 milliGRAM(s) Oral two times a day  insulin glargine Injectable (LANTUS) 12 Unit(s) SubCutaneous at bedtime  insulin lispro (HumaLOG) corrective regimen sliding scale   SubCutaneous three times a day before meals  insulin lispro (HumaLOG) corrective regimen sliding scale   SubCutaneous at bedtime  insulin lispro Injectable (HumaLOG) 2 Unit(s) SubCutaneous three times a day with meals  lactulose Syrup 15 Gram(s) Oral two times a day  montelukast 10 milliGRAM(s) Oral daily  multivitamin 1 Tablet(s) Oral daily  mupirocin 2% Ointment 1 Application(s) Topical <User Schedule>  pantoprazole    Tablet 40 milliGRAM(s) Oral before breakfast  polyethylene glycol 3350 17 Gram(s) Oral daily  senna 2 Tablet(s) Oral at bedtime  theophylline ER (24 Hour) 400 milliGRAM(s) Oral daily  tiotropium 18 MICROgram(s) Capsule 1 Capsule(s) Inhalation daily    MEDICATIONS  (PRN):  glucagon  Injectable 1 milliGRAM(s) IntraMuscular once PRN Glucose LESS THAN 70 milligrams/deciliter  guaiFENesin   Syrup  (Sugar-Free) 100 milliGRAM(s) Oral every 6 hours PRN Cough  oxyCODONE    IR 5 milliGRAM(s) Oral every 6 hours PRN Severe Pain (7 - 10)    Vital Signs Last 24 Hrs  T(C): 36.4 (10 Sep 2020 06:07), Max: 36.8 (09 Sep 2020 14:56)  T(F): 97.6 (10 Sep 2020 06:07), Max: 98.3 (09 Sep 2020 23:44)  HR: 86 (10 Sep 2020 09:01) (82 - 98)  BP: 148/74 (10 Sep 2020 06:07) (135/73 - 152/83)  BP(mean): --  RR: 18 (10 Sep 2020 06:07) (18 - 19)  SpO2: 99% (10 Sep 2020 09:01) (97% - 100%)    I&O's Summary    09 Sep 2020 07:01  -  10 Sep 2020 07:00  --------------------------------------------------------  IN: 585 mL / OUT: 2000 mL / NET: -1415 mL    10 Sep 2020 07:01  -  10 Sep 2020 11:01  --------------------------------------------------------  IN: 120 mL / OUT: 0 mL / NET: 120 mL          Physical Exam:   GENERAL: NAD, well-groomed, well-developed  HEENT: MITCH/   Atraumatic, Normocephalic  ENMT: No tonsillar erythema, exudates, or enlargement; Moist mucous membranes, Good dentition, No lesions  NECK: Supple, No JVD, Normal thyroid  CHEST/LUNG: poor air entry bilaterally:   CVS: Regular rate and rhythm; No murmurs, rubs, or gallops  GI: : Soft, Nontender, Nondistended; Bowel sounds present  NERVOUS SYSTEM:  Alert & Oriented X3  EXTREMITIES:Less edema  LYMPH: No lymphadenopathy noted  SKIN: No rashes or lesions  ENDOCRINOLOGY: No Thyromegaly  PSYCH: Appropriate    Labs:  ABG - ( 10 Sep 2020 08:57 )  pH, Arterial: 7.36  pH, Blood: x     /  pCO2: 87    /  pO2: 53    / HCO3: 48    / Base Excess: 19.7  /  SaO2: 83                                          9.0    9.63  )-----------( 595      ( 10 Sep 2020 08:49 )             30.9                         8.5    7.57  )-----------( 620      ( 09 Sep 2020 08:53 )             30.1                         8.4    7.62  )-----------( 519      ( 08 Sep 2020 09:40 )             29.5                         8.3    8.34  )-----------( 525      ( 07 Sep 2020 10:50 )             29.2     09-10    140  |  87<L>  |  25<H>  ----------------------------<  161<H>  3.8   |  43<H>  |  1.07  09-09    142  |  91<L>  |  23  ----------------------------<  158<H>  3.7   |  43<H>  |  1.23  09-08    140  |  90<L>  |  20  ----------------------------<  153<H>  3.5   |  43<H>  |  0.98  09-07    137  |  88<L>  |  26<H>  ----------------------------<  125<H>  3.8   |  38<H>  |  1.10    Ca    10.0      10 Sep 2020 08:49  Ca    9.8      09 Sep 2020 08:53      CAPILLARY BLOOD GLUCOSE      POCT Blood Glucose.: 170 mg/dL (10 Sep 2020 09:45)  POCT Blood Glucose.: 112 mg/dL (09 Sep 2020 22:08)  POCT Blood Glucose.: 204 mg/dL (09 Sep 2020 18:28)  POCT Blood Glucose.: 204 mg/dL (09 Sep 2020 12:58)              < from: Xray Chest 1 View- PORTABLE-Routine (08.01.20 @ 16:44) >    EXAM:  XR CHEST PORTABLE ROUTINE 1V                            PROCEDURE DATE:  08/01/2020            INTERPRETATION:  CLINICAL INFORMATION: Screening, history COPD.    TECHNIQUE: Single portable view of the chest.    COMPARISON: 12/11/2019.      IMPRESSION:    The lungs are clear.  Heart size cannot be accurately assessed in this projection.  No acute bone abnormality.              GOVIND SIMMONS M.D., RESIDENT RADIOLOGIST  This document has been electronically signed.  CHELSEY SMITH M.D., ATTENDING RADIOLOGIST  This document has been electronically signed. Aug  2 2020  9:05AM              < end of copied text >      RECENT CULTURES:        RESPIRATORY CULTURES:          Studies  Chest X-RAY  CT SCAN Chest   Venous Dopplers: LE:   CT Abdomen  Others

## 2020-09-10 NOTE — PROVIDER CONTACT NOTE (CRITICAL VALUE NOTIFICATION) - PERSON GIVING RESULT:
Ann Smerling
Ernestine Sayed
Justus Modi
Justus Modi-lab
LAB Bala Carmichael
Lab - Justus Modi
Lab- Ame Loja
[place]
reed gibbs
reed gibbs

## 2020-09-10 NOTE — PROGRESS NOTE ADULT - SUBJECTIVE AND OBJECTIVE BOX
Patient is a 60y old  Female who presents with a chief complaint of 60F p/w generalized weakness and difficulty walking (10 Sep 2020 11:01)    SUBJECTIVE / OVERNIGHT EVENTS: no acute events overnight, kept BiPAP on during day yesterday and also throughout the night     MEDICATIONS  (STANDING):  acetaminophen  IVPB .. 1000 milliGRAM(s) IV Intermittent once  albuterol/ipratropium for Nebulization 3 milliLiter(s) Nebulizer every 6 hours  apixaban 5 milliGRAM(s) Oral every 12 hours  ascorbic acid 500 milliGRAM(s) Oral daily  aspirin enteric coated 81 milliGRAM(s) Oral daily  atorvastatin 80 milliGRAM(s) Oral at bedtime  azithromycin   Tablet 250 milliGRAM(s) Oral <User Schedule>  Biotene Dry Mouth Oral Rinse 5 milliLiter(s) Swish and Spit two times a day  bisacodyl Suppository 10 milliGRAM(s) Rectal daily  budesonide 160 MICROgram(s)/formoterol 4.5 MICROgram(s) Inhaler 2 Puff(s) Inhalation two times a day  buMETAnide Infusion 0.5 mG/Hr (2.5 mL/Hr) IV Continuous <Continuous>  chlorhexidine 2% Cloths 1 Application(s) Topical daily  cholecalciferol 2000 Unit(s) Oral daily  dextrose 5%. 1000 milliLiter(s) (50 mL/Hr) IV Continuous <Continuous>  dextrose 50% Injectable 25 Gram(s) IV Push once  diltiazem    milliGRAM(s) Oral daily  ferrous    sulfate 325 milliGRAM(s) Oral two times a day  insulin glargine Injectable (LANTUS) 12 Unit(s) SubCutaneous at bedtime  insulin lispro (HumaLOG) corrective regimen sliding scale   SubCutaneous three times a day before meals  insulin lispro (HumaLOG) corrective regimen sliding scale   SubCutaneous at bedtime  insulin lispro Injectable (HumaLOG) 2 Unit(s) SubCutaneous three times a day with meals  lactulose Syrup 15 Gram(s) Oral two times a day  montelukast 10 milliGRAM(s) Oral daily  multivitamin 1 Tablet(s) Oral daily  mupirocin 2% Ointment 1 Application(s) Topical <User Schedule>  pantoprazole    Tablet 40 milliGRAM(s) Oral before breakfast  polyethylene glycol 3350 17 Gram(s) Oral daily  senna 2 Tablet(s) Oral at bedtime  theophylline ER (24 Hour) 400 milliGRAM(s) Oral daily  tiotropium 18 MICROgram(s) Capsule 1 Capsule(s) Inhalation daily    MEDICATIONS  (PRN):  glucagon  Injectable 1 milliGRAM(s) IntraMuscular once PRN Glucose LESS THAN 70 milligrams/deciliter  guaiFENesin   Syrup  (Sugar-Free) 100 milliGRAM(s) Oral every 6 hours PRN Cough  oxyCODONE    IR 5 milliGRAM(s) Oral every 6 hours PRN Severe Pain (7 - 10)      Vital Signs Last 24 Hrs  T(C): 36.4 (10 Sep 2020 06:07), Max: 36.8 (09 Sep 2020 14:56)  T(F): 97.6 (10 Sep 2020 06:07), Max: 98.3 (09 Sep 2020 23:44)  HR: 86 (10 Sep 2020 09:01) (82 - 98)  BP: 148/74 (10 Sep 2020 06:07) (135/73 - 152/83)  BP(mean): --  RR: 18 (10 Sep 2020 06:07) (18 - 19)  SpO2: 99% (10 Sep 2020 09:01) (97% - 100%)  CAPILLARY BLOOD GLUCOSE      POCT Blood Glucose.: 170 mg/dL (10 Sep 2020 09:45)  POCT Blood Glucose.: 112 mg/dL (09 Sep 2020 22:08)  POCT Blood Glucose.: 204 mg/dL (09 Sep 2020 18:28)  POCT Blood Glucose.: 204 mg/dL (09 Sep 2020 12:58)    I&O's Summary    09 Sep 2020 07:01  -  10 Sep 2020 07:00  --------------------------------------------------------  IN: 585 mL / OUT: 2000 mL / NET: -1415 mL    10 Sep 2020 07:01  -  10 Sep 2020 11:29  --------------------------------------------------------  IN: 120 mL / OUT: 0 mL / NET: 120 mL      PHYSICAL EXAM:  GENERAL: NAD  EYES:  conjunctiva and sclera clear  CHEST/LUNG: Clear to auscultation bilaterally; No wheeze  HEART: +S1/S2, reg   ABDOMEN: Soft, Nontender, Nondistended  EXTREMITIES: +1 pitting edema in the LE up to shins, marked decrease in thigh edema and hip edema   PSYCH: AAOx3    LABS:                        9.0    9.63  )-----------( 595      ( 10 Sep 2020 08:49 )             30.9     09-10    140  |  87<L>  |  25<H>  ----------------------------<  161<H>  3.8   |  43<H>  |  1.07    Ca    10.0      10 Sep 2020 08:49      Care Discussed with Consultants/Other Providers: Dr. Coyle

## 2020-09-10 NOTE — PROGRESS NOTE ADULT - ASSESSMENT
60F w COPD on 3LNC (?pending transplant list at [facility]), former smoker, CAD s/p stent (2016), afib on eliquis, uncontrolled DM2, raynaud phenomenon and recent hospitalization at [facility] for altered mental status and AURORA aw COPD exacerbation, LE swelling, weakness.     Prolonged hospitalization.

## 2020-09-10 NOTE — PROGRESS NOTE ADULT - ASSESSMENT
60F w/ end stage COPD on 3LNC (pending transplant list at [facility]), former smoker, CAD s/p stent (2016), afib on eliquis, uncontrolled DM2, raynaud phenomenon and recent hospitalization at [facility] for altered mental status and AURORA presents to [facility] for evaluation of generalized weakness and difficulty walking admit to medicine for further evaluation.    Copd exacerbation: with hypoxic and hypercarbic resp failure  CAD: s/p stent  a Fibrillation  uncontrolled dm   Raynaud's PHENOMENON  aurora    She is on appropriate rx of copd at this time including theophylline  would check its levels :  Cont BD:   off steroids at this time:  No pulm htn pn ECHO:  She is already on full dose AC for a fibrillation:  She is on bipap at 15 /5: abg NOTED:  hco3 is creeping  up: need to be watched may need diamox: ph is 7.40  can she be transferred to [facility]: she did go there before and was supposed to finish few tests prior to that   She is on IV Bumex:   jere brother and team     9/4:  her hco3 is increasing:   give 250 mg of Diamox today :  this may have to be repeated every day for next few days depending upon her hco3:  she is being agressively diuresed:  and her leg edema is much better:  for now to cont curretn rx:  Her theophylline levels are fine:  JERE PMD       9/8:  clinically she is doing better:  her hco3 has slowly started creeping up again:  do ABG in AM: will repeat diamox tomorrow depending upon his hco3 as well as ph   CONT DIURESIS  she kerr feel better clinically   her leg edema is better then before     9/9:  ac on chr hypercarbic resp failure:  HCO3 is high but NO DIAMOX:  Cont diureses;  she needs to use more of bipap in daytime as wellas full ti me at night ti me:  Likely more hypercarbic secondary to not using bipap yesterday n the aytime: encouraged her to use bipap more in the daytime today too:  overallprognosis is poor:  jere NP     9/10:  doing same:  her ABG today is better:  She has ac on chr hypoercapnic resp fialure secondary to COPD:   cONT DIURESIS:  NO DIAMOX:   DVT propahyxlis:  Cont oxygen to mantain o2 sao2 above 885:  JERE PMD

## 2020-09-10 NOTE — PROGRESS NOTE ADULT - PROBLEM SELECTOR PLAN 1
- Dyspnea multifactorial in the setting of underlying lung disease, cardiovascular   dysfunction, obesity, and deconditioning.   - Was given prolonged course of Prednisone, now tapered off. Overnight and prn   Bipap. Pt comfortable off Bipap and able to speak in full sentences.   - cont azithro, duonebs, spiriva, symbicort, singulair  - Continue supplemental O2 with goal O2 sat > 88% - she does not need to be at   100%.  - Theophiline at 3.2.  -as per discussion with Dr. Mathew, pt was getting evaluated at [facility] by Dr. Cervantes for lung transplant. However she is NOT listed currently to receive a transplant.  -acidemia improving, however pt still has high CO2. To wear BiPAP throughout the day today as well, discussed with Dr. Coyle.

## 2020-09-11 LAB
ANION GAP SERPL CALC-SCNC: 9 MMOL/L — SIGNIFICANT CHANGE UP (ref 5–17)
BUN SERPL-MCNC: 28 MG/DL — HIGH (ref 7–23)
CALCIUM SERPL-MCNC: 10.4 MG/DL — SIGNIFICANT CHANGE UP (ref 8.4–10.5)
CHLORIDE SERPL-SCNC: 86 MMOL/L — LOW (ref 96–108)
CO2 SERPL-SCNC: 44 MMOL/L — HIGH (ref 22–31)
CREAT SERPL-MCNC: 1.18 MG/DL — SIGNIFICANT CHANGE UP (ref 0.5–1.3)
GLUCOSE BLDC GLUCOMTR-MCNC: 141 MG/DL — HIGH (ref 70–99)
GLUCOSE BLDC GLUCOMTR-MCNC: 152 MG/DL — HIGH (ref 70–99)
GLUCOSE BLDC GLUCOMTR-MCNC: 175 MG/DL — HIGH (ref 70–99)
GLUCOSE SERPL-MCNC: 147 MG/DL — HIGH (ref 70–99)
HCT VFR BLD CALC: 32.7 % — LOW (ref 34.5–45)
HGB BLD-MCNC: 9.3 G/DL — LOW (ref 11.5–15.5)
MAGNESIUM SERPL-MCNC: 2.1 MG/DL — SIGNIFICANT CHANGE UP (ref 1.6–2.6)
MCHC RBC-ENTMCNC: 24.7 PG — LOW (ref 27–34)
MCHC RBC-ENTMCNC: 28.4 GM/DL — LOW (ref 32–36)
MCV RBC AUTO: 86.7 FL — SIGNIFICANT CHANGE UP (ref 80–100)
NRBC # BLD: 0 /100 WBCS — SIGNIFICANT CHANGE UP (ref 0–0)
PLATELET # BLD AUTO: 684 K/UL — HIGH (ref 150–400)
POTASSIUM SERPL-MCNC: 3.6 MMOL/L — SIGNIFICANT CHANGE UP (ref 3.5–5.3)
POTASSIUM SERPL-SCNC: 3.6 MMOL/L — SIGNIFICANT CHANGE UP (ref 3.5–5.3)
RBC # BLD: 3.77 M/UL — LOW (ref 3.8–5.2)
RBC # FLD: 18.2 % — HIGH (ref 10.3–14.5)
SODIUM SERPL-SCNC: 139 MMOL/L — SIGNIFICANT CHANGE UP (ref 135–145)
WBC # BLD: 9.59 K/UL — SIGNIFICANT CHANGE UP (ref 3.8–10.5)
WBC # FLD AUTO: 9.59 K/UL — SIGNIFICANT CHANGE UP (ref 3.8–10.5)

## 2020-09-11 PROCEDURE — 99233 SBSQ HOSP IP/OBS HIGH 50: CPT

## 2020-09-11 RX ORDER — OXYCODONE HYDROCHLORIDE 5 MG/1
5 TABLET ORAL EVERY 6 HOURS
Refills: 0 | Status: DISCONTINUED | OUTPATIENT
Start: 2020-09-11 | End: 2020-09-14

## 2020-09-11 RX ADMIN — Medication 325 MILLIGRAM(S): at 18:35

## 2020-09-11 RX ADMIN — Medication 5 MILLILITER(S): at 21:29

## 2020-09-11 RX ADMIN — Medication 2 UNIT(S): at 10:57

## 2020-09-11 RX ADMIN — Medication 500 MILLIGRAM(S): at 14:26

## 2020-09-11 RX ADMIN — PANTOPRAZOLE SODIUM 40 MILLIGRAM(S): 20 TABLET, DELAYED RELEASE ORAL at 06:20

## 2020-09-11 RX ADMIN — POLYETHYLENE GLYCOL 3350 17 GRAM(S): 17 POWDER, FOR SOLUTION ORAL at 14:28

## 2020-09-11 RX ADMIN — INSULIN GLARGINE 12 UNIT(S): 100 INJECTION, SOLUTION SUBCUTANEOUS at 21:27

## 2020-09-11 RX ADMIN — TIOTROPIUM BROMIDE 1 CAPSULE(S): 18 CAPSULE ORAL; RESPIRATORY (INHALATION) at 14:16

## 2020-09-11 RX ADMIN — APIXABAN 5 MILLIGRAM(S): 2.5 TABLET, FILM COATED ORAL at 18:34

## 2020-09-11 RX ADMIN — OXYCODONE HYDROCHLORIDE 5 MILLIGRAM(S): 5 TABLET ORAL at 00:47

## 2020-09-11 RX ADMIN — Medication 2 UNIT(S): at 21:22

## 2020-09-11 RX ADMIN — AZITHROMYCIN 250 MILLIGRAM(S): 500 TABLET, FILM COATED ORAL at 18:34

## 2020-09-11 RX ADMIN — Medication 325 MILLIGRAM(S): at 06:20

## 2020-09-11 RX ADMIN — Medication 400 MILLIGRAM(S): at 14:27

## 2020-09-11 RX ADMIN — BUMETANIDE 2.5 MG/HR: 0.25 INJECTION INTRAMUSCULAR; INTRAVENOUS at 14:28

## 2020-09-11 RX ADMIN — Medication 1: at 10:57

## 2020-09-11 RX ADMIN — Medication 3 MILLILITER(S): at 18:34

## 2020-09-11 RX ADMIN — MUPIROCIN 1 APPLICATION(S): 20 OINTMENT TOPICAL at 14:27

## 2020-09-11 RX ADMIN — Medication 3 MILLILITER(S): at 06:20

## 2020-09-11 RX ADMIN — ATORVASTATIN CALCIUM 80 MILLIGRAM(S): 80 TABLET, FILM COATED ORAL at 21:23

## 2020-09-11 RX ADMIN — SENNA PLUS 2 TABLET(S): 8.6 TABLET ORAL at 21:23

## 2020-09-11 RX ADMIN — OXYCODONE HYDROCHLORIDE 5 MILLIGRAM(S): 5 TABLET ORAL at 19:00

## 2020-09-11 RX ADMIN — CHLORHEXIDINE GLUCONATE 1 APPLICATION(S): 213 SOLUTION TOPICAL at 14:27

## 2020-09-11 RX ADMIN — Medication 2000 UNIT(S): at 14:26

## 2020-09-11 RX ADMIN — BUDESONIDE AND FORMOTEROL FUMARATE DIHYDRATE 2 PUFF(S): 160; 4.5 AEROSOL RESPIRATORY (INHALATION) at 06:20

## 2020-09-11 RX ADMIN — Medication 1: at 15:50

## 2020-09-11 RX ADMIN — Medication 180 MILLIGRAM(S): at 06:20

## 2020-09-11 RX ADMIN — BUDESONIDE AND FORMOTEROL FUMARATE DIHYDRATE 2 PUFF(S): 160; 4.5 AEROSOL RESPIRATORY (INHALATION) at 18:35

## 2020-09-11 RX ADMIN — APIXABAN 5 MILLIGRAM(S): 2.5 TABLET, FILM COATED ORAL at 06:20

## 2020-09-11 RX ADMIN — Medication 81 MILLIGRAM(S): at 14:26

## 2020-09-11 RX ADMIN — Medication 3 MILLILITER(S): at 14:15

## 2020-09-11 RX ADMIN — Medication 1 TABLET(S): at 14:26

## 2020-09-11 RX ADMIN — Medication 5 MILLILITER(S): at 06:20

## 2020-09-11 RX ADMIN — OXYCODONE HYDROCHLORIDE 5 MILLIGRAM(S): 5 TABLET ORAL at 18:24

## 2020-09-11 RX ADMIN — Medication 2 UNIT(S): at 15:50

## 2020-09-11 RX ADMIN — MONTELUKAST 10 MILLIGRAM(S): 4 TABLET, CHEWABLE ORAL at 14:16

## 2020-09-11 NOTE — PROGRESS NOTE ADULT - ASSESSMENT
60F w/ end stage COPD on 3LNC (pending transplant list at [facility]), former smoker, CAD s/p stent (2016), afib on eliquis, uncontrolled DM2, raynaud phenomenon and recent hospitalization at [facility] for altered mental status and AURORA presents to [facility] for evaluation of generalized weakness and difficulty walking admit to medicine for further evaluation.    Copd exacerbation: with hypoxic and hypercarbic resp failure  CAD: s/p stent  a Fibrillation  uncontrolled dm   Raynaud's PHENOMENON  aurora    She is on appropriate rx of copd at this time including theophylline  would check its levels :  Cont BD:   off steroids at this time:  No pulm htn pn ECHO:  She is already on full dose AC for a fibrillation:  She is on bipap at 15 /5: abg NOTED:  hco3 is creeping  up: need to be watched may need diamox: ph is 7.40  can she be transferred to [facility]: she did go there before and was supposed to finish few tests prior to that   She is on IV Bumex:   niko brother and team     9/4:  her hco3 is increasing:   give 250 mg of Diamox today :  this may have to be repeated every day for next few days depending upon her hco3:  she is being agressively diuresed:  and her leg edema is much better:  for now to cont curretn rx:  Her theophylline levels are fine:  NIKO PMD       9/8:  clinically she is doing better:  her hco3 has slowly started creeping up again:  do ABG in AM: will repeat diamox tomorrow depending upon his hco3 as well as ph   CONT DIURESIS  she kerr feel better clinically   her leg edema is better then before     9/9:  ac on chr hypercarbic resp failure:  HCO3 is high but NO DIAMOX:  Cont diureses;  she needs to use more of bipap in daytime as wellas full ti me at night ti me:  Likely more hypercarbic secondary to not using bipap yesterday n the aytime: encouraged her to use bipap more in the daytime today too:  overallprognosis is poor:  niko NP     9/10:  doing same:  her ABG today is better:  She has ac on chr hypoercapnic resp fialure secondary to COPD:   cONT DIURESIS:  NO DIAMOX:   DVT propahyxlis:  Cont oxygen to mantain o2 sao2 above 885:  NIKO PMD    9/11:  doing same:  gets SOB off bipap :  shje would need bipap 24 hours a day :   last ABg with increase in PH:   Cont diuresis per primary team   HCO3 mildly increased but last ph was still acidotic: no diamox for now:  rpt ABG jeremy the weekend:   NIKO pt

## 2020-09-11 NOTE — PROGRESS NOTE ADULT - ASSESSMENT
60F w COPD on 3LNC (?pending transplant list at [facility]), former smoker, CAD s/p stent (2016), afib on eliquis, uncontrolled DM2, raynaud phenomenon and recent hospitalization at [facility] for altered mental status and AURORA aw COPD exacerbation, LE swelling, weakness.     Prolonged hospitalization.

## 2020-09-11 NOTE — PROGRESS NOTE ADULT - SUBJECTIVE AND OBJECTIVE BOX
Patient is a 60y old  Female who presents with a chief complaint of 60F p/w generalized weakness and difficulty walking (11 Sep 2020 13:02)      Any change in ROS: feels  SOB off bipap     MEDICATIONS  (STANDING):  acetaminophen  IVPB .. 1000 milliGRAM(s) IV Intermittent once  albuterol/ipratropium for Nebulization 3 milliLiter(s) Nebulizer every 6 hours  apixaban 5 milliGRAM(s) Oral every 12 hours  ascorbic acid 500 milliGRAM(s) Oral daily  aspirin enteric coated 81 milliGRAM(s) Oral daily  atorvastatin 80 milliGRAM(s) Oral at bedtime  azithromycin   Tablet 250 milliGRAM(s) Oral <User Schedule>  Biotene Dry Mouth Oral Rinse 5 milliLiter(s) Swish and Spit two times a day  bisacodyl Suppository 10 milliGRAM(s) Rectal daily  budesonide 160 MICROgram(s)/formoterol 4.5 MICROgram(s) Inhaler 2 Puff(s) Inhalation two times a day  buMETAnide Infusion 0.5 mG/Hr (2.5 mL/Hr) IV Continuous <Continuous>  chlorhexidine 2% Cloths 1 Application(s) Topical daily  cholecalciferol 2000 Unit(s) Oral daily  dextrose 5%. 1000 milliLiter(s) (50 mL/Hr) IV Continuous <Continuous>  dextrose 50% Injectable 25 Gram(s) IV Push once  diltiazem    milliGRAM(s) Oral daily  ferrous    sulfate 325 milliGRAM(s) Oral two times a day  insulin glargine Injectable (LANTUS) 12 Unit(s) SubCutaneous at bedtime  insulin lispro (HumaLOG) corrective regimen sliding scale   SubCutaneous three times a day before meals  insulin lispro (HumaLOG) corrective regimen sliding scale   SubCutaneous at bedtime  insulin lispro Injectable (HumaLOG) 2 Unit(s) SubCutaneous three times a day with meals  lactulose Syrup 15 Gram(s) Oral two times a day  montelukast 10 milliGRAM(s) Oral daily  multivitamin 1 Tablet(s) Oral daily  mupirocin 2% Ointment 1 Application(s) Topical <User Schedule>  pantoprazole    Tablet 40 milliGRAM(s) Oral before breakfast  polyethylene glycol 3350 17 Gram(s) Oral daily  senna 2 Tablet(s) Oral at bedtime  theophylline ER (24 Hour) 400 milliGRAM(s) Oral daily  tiotropium 18 MICROgram(s) Capsule 1 Capsule(s) Inhalation daily    MEDICATIONS  (PRN):  glucagon  Injectable 1 milliGRAM(s) IntraMuscular once PRN Glucose LESS THAN 70 milligrams/deciliter  guaiFENesin   Syrup  (Sugar-Free) 100 milliGRAM(s) Oral every 6 hours PRN Cough    Vital Signs Last 24 Hrs  T(C): 36.8 (11 Sep 2020 12:00), Max: 37 (10 Sep 2020 19:56)  T(F): 98.2 (11 Sep 2020 12:00), Max: 98.6 (10 Sep 2020 19:56)  HR: 74 (11 Sep 2020 12:00) (69 - 91)  BP: 131/73 (11 Sep 2020 12:00) (130/70 - 153/80)  BP(mean): --  RR: 18 (11 Sep 2020 12:00) (18 - 19)  SpO2: 100% (11 Sep 2020 12:00) (97% - 100%)    I&O's Summary    10 Sep 2020 07:01  -  11 Sep 2020 07:00  --------------------------------------------------------  IN: 415 mL / OUT: 2050 mL / NET: -1635 mL    11 Sep 2020 07:01  -  11 Sep 2020 13:49  --------------------------------------------------------  IN: 0 mL / OUT: 650 mL / NET: -650 mL          Physical Exam:   GENERAL: NAD, well-groomed, well-developed  HEENT: MITCH/   Atraumatic, Normocephalic  ENMT: No tonsillar erythema, exudates, or enlargement; Moist mucous membranes, Good dentition, No lesions  NECK: Supple, No JVD, Normal thyroid  CHEST/LUNG: poor air entery   CVS: Regular rate and rhythm; No murmurs, rubs, or gallops  GI: : Soft, Nontender, Nondistended; Bowel sounds present  NERVOUS SYSTEM:  Alert & Oriented X3  EXTREMITIES:  + edema  LYMPH: No lymphadenopathy noted  SKIN: No rashes or lesions  ENDOCRINOLOGY: No Thyromegaly  PSYCH: Appropriate    Labs:  ABG - ( 10 Sep 2020 08:57 )  pH, Arterial: 7.36  pH, Blood: x     /  pCO2: 87    /  pO2: 53    / HCO3: 48    / Base Excess: 19.7  /  SaO2: 83                                          9.3    9.59  )-----------( 684      ( 11 Sep 2020 09:29 )             32.7                         9.0    9.63  )-----------( 595      ( 10 Sep 2020 08:49 )             30.9                         8.5    7.57  )-----------( 620      ( 09 Sep 2020 08:53 )             30.1                         8.4    7.62  )-----------( 519      ( 08 Sep 2020 09:40 )             29.5     09-11    139  |  86<L>  |  28<H>  ----------------------------<  147<H>  3.6   |  44<H>  |  1.18  09-10    140  |  87<L>  |  25<H>  ----------------------------<  161<H>  3.8   |  43<H>  |  1.07  09-09    142  |  91<L>  |  23  ----------------------------<  158<H>  3.7   |  43<H>  |  1.23  09-08    140  |  90<L>  |  20  ----------------------------<  153<H>  3.5   |  43<H>  |  0.98    Ca    10.4      11 Sep 2020 09:29  Ca    10.0      10 Sep 2020 08:49  Mg     2.1     09-11      CAPILLARY BLOOD GLUCOSE      POCT Blood Glucose.: 175 mg/dL (11 Sep 2020 10:20)  POCT Blood Glucose.: 248 mg/dL (10 Sep 2020 21:40)  POCT Blood Glucose.: 172 mg/dL (10 Sep 2020 20:23)      < from: Xray Chest 1 View- PORTABLE-Routine (08.01.20 @ 16:44) >  EXAM:  XR CHEST PORTABLE ROUTINE 1V                            PROCEDURE DATE:  08/01/2020            INTERPRETATION:  CLINICAL INFORMATION: Screening, history COPD.    TECHNIQUE: Single portable view of the chest.    COMPARISON: 12/11/2019.      IMPRESSION:    The lungs are clear.  Heart size cannot be accurately assessed in this projection.  No acute bone abnormality.              GOVIND SIMMONS M.D., RESIDENT RADIOLOGIST  This document has been electronically signed.  CHELSEY SMITH M.D., ATTENDING RADIOLOGIST  This document has been electronically signed. Aug  2 2020  9:05AM        < end of copied text >              RECENT CULTURES:        RESPIRATORY CULTURES:          Studies  Chest X-RAY  CT SCAN Chest   Venous Dopplers: LE:   CT Abdomen  Others

## 2020-09-11 NOTE — CHART NOTE - NSCHARTNOTEFT_GEN_A_CORE
Nutrition Follow Up Note  Patient seen for: length of stay follow up. Chart reviewed and events noted. Pt is a 61 y/o F Hx COPD on 3LNC (?pending transplant list at [facility]), former smoker, CAD s/p stent (2016), afib on eliquis, uncontrolled DM2, raynaud phenomenon and recent hospitalization at [facility] for altered mental status and AURORA aw COPD exacerbation, LE swelling, weakness. " pt still has high CO2. To wear BiPAP throughout the day" Planning for d/c Monday.     Source:   Medical record and pt    Diet :   Consistent Carbohydrate w/Evening Snack  800mL Fluid Restriction ([id number])    Patient reports: that her appetite and PO intake have remained stable in-house. Pt with fair intake. Denies any recent N/V or diarrhea. Last BM 9/10. Pt declines any further nutrition education, made aware RD to remain available.      PO intake :  % of meals in-house     Source for PO intake:  Pt    Daily Weight in lb: 186.5 (09-11), 188.4 (09-09), 194.4 (09-05), 208.9 (09-01), 206.3 (08/18), 199 (08/01)  Pt noted with downtrending wt, however pt declines wt loss. Possibly related to fluid shifts at pt with varying edema throughout admission. and is not amenable to nutrition focused physical exam at this time.     Pertinent Medications: MEDICATIONS  (STANDING):  acetaminophen  IVPB .. 1000 milliGRAM(s) IV Intermittent once  albuterol/ipratropium for Nebulization 3 milliLiter(s) Nebulizer every 6 hours  apixaban 5 milliGRAM(s) Oral every 12 hours  ascorbic acid 500 milliGRAM(s) Oral daily  aspirin enteric coated 81 milliGRAM(s) Oral daily  atorvastatin 80 milliGRAM(s) Oral at bedtime  azithromycin   Tablet 250 milliGRAM(s) Oral <User Schedule>  Biotene Dry Mouth Oral Rinse 5 milliLiter(s) Swish and Spit two times a day  bisacodyl Suppository 10 milliGRAM(s) Rectal daily  budesonide 160 MICROgram(s)/formoterol 4.5 MICROgram(s) Inhaler 2 Puff(s) Inhalation two times a day  buMETAnide Infusion 0.5 mG/Hr (2.5 mL/Hr) IV Continuous <Continuous>  chlorhexidine 2% Cloths 1 Application(s) Topical daily  cholecalciferol 2000 Unit(s) Oral daily  dextrose 5%. 1000 milliLiter(s) (50 mL/Hr) IV Continuous <Continuous>  dextrose 50% Injectable 25 Gram(s) IV Push once  diltiazem    milliGRAM(s) Oral daily  ferrous    sulfate 325 milliGRAM(s) Oral two times a day  insulin glargine Injectable (LANTUS) 12 Unit(s) SubCutaneous at bedtime  insulin lispro (HumaLOG) corrective regimen sliding scale   SubCutaneous three times a day before meals  insulin lispro (HumaLOG) corrective regimen sliding scale   SubCutaneous at bedtime  insulin lispro Injectable (HumaLOG) 2 Unit(s) SubCutaneous three times a day with meals  lactulose Syrup 15 Gram(s) Oral two times a day  montelukast 10 milliGRAM(s) Oral daily  multivitamin 1 Tablet(s) Oral daily  mupirocin 2% Ointment 1 Application(s) Topical <User Schedule>  pantoprazole    Tablet 40 milliGRAM(s) Oral before breakfast  polyethylene glycol 3350 17 Gram(s) Oral daily  senna 2 Tablet(s) Oral at bedtime  theophylline ER (24 Hour) 400 milliGRAM(s) Oral daily  tiotropium 18 MICROgram(s) Capsule 1 Capsule(s) Inhalation daily    MEDICATIONS  (PRN):  glucagon  Injectable 1 milliGRAM(s) IntraMuscular once PRN Glucose LESS THAN 70 milligrams/deciliter  guaiFENesin   Syrup  (Sugar-Free) 100 milliGRAM(s) Oral every 6 hours PRN Cough    Pertinent Labs: 09-11 @ 09:29: Na 139, BUN 28<H>, Cr 1.18, <H>, K+ 3.6, Phos --, Mg 2.1, Alk Phos --, ALT/SGPT --, AST/SGOT --, HbA1c --    Finger Sticks:  POCT Blood Glucose.: 175 mg/dL (09-11 @ 10:20)  POCT Blood Glucose.: 248 mg/dL (09-10 @ 21:40)  POCT Blood Glucose.: 172 mg/dL (09-10 @ 20:23)  POCT Blood Glucose.: 167 mg/dL (09-10 @ 13:34)    Skin per nursing documentation: No pressure injuries noted.   Edema per nursing documentation: +1 Janell. leg (previously  +3 janell. leg edema as per flow sheets)    Estimated Needs:   [x] no change since previous assessment  Based on admission 171 lbs, 77.5kg (7/23), fluid deferred to team  Estimated Protein Needs (1.0-1.2 gm/kg): 77.5-93 gm    Previous Nutrition Diagnosis: Altered nutrition related lab values (HbA1c)   Nutrition Diagnosis is: ongoing and being addressed with consistent carbohydrate diet.     New Nutrition Diagnosis: N/A    Recommend  1) Continue current consistent carbohydrate diet, 800 ml fluid restriction (defer fluids to team)  2) Provide nutrition therapy education as needed/requested.    Monitoring and Evaluation:   Continue to monitor Nutritional intake, Tolerance to diet prescription, weights, labs, skin integrity    RD remains available upon request and will follow up per protocol  Katie Singh MS, RD, Pager #[phone]

## 2020-09-11 NOTE — PROGRESS NOTE ADULT - PROBLEM SELECTOR PLAN 2
- C/w bumex gtt at 0.5mg/hr, good urine output  - Monitor BMP, Mg, Phos Q12 hrs and replete as needed   - Diamox on hold for now.  - Strict ins and outs, f/u bladder scan today   - C/w diuresis as per cardiology, will discuss when to titrate down and stop gtt   - LE edema is improving  - likely can stop bumex gtt on saturday or sunday and transition to oral bumex - C/w bumex gtt at 0.5mg/hr, good urine output  - Monitor BMP, Mg, Phos Q12 hrs and replete as needed   - Diamox on hold for now.  - Strict ins and outs, f/u bladder scan today   - C/w diuresis as per cardiology, will discuss when to titrate down and stop gtt   - LE edema is improving  -Discussed with Dr. Brunson today, he will see her tomorrow and likely stop gtt and transition to Q8hr IV bumex. F/U final cards recs tomorrow regarding bumex gtt.

## 2020-09-11 NOTE — PROGRESS NOTE ADULT - ATTENDING COMMENTS
Plan for discharge to rehab on monday  discussed with CM and patient, agreeable to plan  continue with optimization over the weekend

## 2020-09-11 NOTE — PROGRESS NOTE ADULT - PROBLEM SELECTOR PLAN 1
- Dyspnea multifactorial in the setting of underlying lung disease, cardiovascular   dysfunction, obesity, and deconditioning.   - Was given prolonged course of Prednisone, now tapered off. Overnight and prn   Bipap. Pt comfortable off Bipap and able to speak in full sentences.   - cont azithro, duonebs, spiriva, symbicort, singulair  - Continue supplemental O2 with goal O2 sat > 88% - she does not need to be at   100%.  - Theophiline at 3.2.  -as per discussion with Dr. Mathew, pt was getting evaluated at [facility] by Dr. Cervantes for lung transplant. However she is NOT listed currently to receive a transplant.  -acidemia improving, however pt still has high CO2. To wear BiPAP throughout the day.   -f/u ABG  -would monitor ABG over the weekend and f/u with pulm regarding BiPAP

## 2020-09-11 NOTE — PROGRESS NOTE ADULT - SUBJECTIVE AND OBJECTIVE BOX
CARDIOLOGY FOLLOW UP - Dr. Brunson    CC no acute events       PHYSICAL EXAM:  T(C): 36.8 (09-11-20 @ 12:00), Max: 37 (09-10-20 @ 19:56)  HR: 74 (09-11-20 @ 12:00) (69 - 91)  BP: 131/73 (09-11-20 @ 12:00) (130/70 - 153/80)  RR: 18 (09-11-20 @ 12:00) (18 - 19)  SpO2: 100% (09-11-20 @ 12:00) (97% - 100%)  Wt(kg): --  I&O's Summary    10 Sep 2020 07:01  -  11 Sep 2020 07:00  --------------------------------------------------------  IN: 415 mL / OUT: 2050 mL / NET: -1635 mL    11 Sep 2020 07:01  -  11 Sep 2020 13:02  --------------------------------------------------------  IN: 0 mL / OUT: 650 mL / NET: -650 mL        Appearance: Normal	  Cardiovascular: Normal S1 S2,RRR   Respiratory: diminished   Gastrointestinal:  Soft, Non-tender, + BS	  Extremities: b/l le edema - improving        MEDICATIONS  (STANDING):  acetaminophen  IVPB .. 1000 milliGRAM(s) IV Intermittent once  albuterol/ipratropium for Nebulization 3 milliLiter(s) Nebulizer every 6 hours  apixaban 5 milliGRAM(s) Oral every 12 hours  ascorbic acid 500 milliGRAM(s) Oral daily  aspirin enteric coated 81 milliGRAM(s) Oral daily  atorvastatin 80 milliGRAM(s) Oral at bedtime  azithromycin   Tablet 250 milliGRAM(s) Oral <User Schedule>  Biotene Dry Mouth Oral Rinse 5 milliLiter(s) Swish and Spit two times a day  bisacodyl Suppository 10 milliGRAM(s) Rectal daily  budesonide 160 MICROgram(s)/formoterol 4.5 MICROgram(s) Inhaler 2 Puff(s) Inhalation two times a day  buMETAnide Infusion 0.5 mG/Hr (2.5 mL/Hr) IV Continuous <Continuous>  chlorhexidine 2% Cloths 1 Application(s) Topical daily  cholecalciferol 2000 Unit(s) Oral daily  dextrose 5%. 1000 milliLiter(s) (50 mL/Hr) IV Continuous <Continuous>  dextrose 50% Injectable 25 Gram(s) IV Push once  diltiazem    milliGRAM(s) Oral daily  ferrous    sulfate 325 milliGRAM(s) Oral two times a day  insulin glargine Injectable (LANTUS) 12 Unit(s) SubCutaneous at bedtime  insulin lispro (HumaLOG) corrective regimen sliding scale   SubCutaneous three times a day before meals  insulin lispro (HumaLOG) corrective regimen sliding scale   SubCutaneous at bedtime  insulin lispro Injectable (HumaLOG) 2 Unit(s) SubCutaneous three times a day with meals  lactulose Syrup 15 Gram(s) Oral two times a day  montelukast 10 milliGRAM(s) Oral daily  multivitamin 1 Tablet(s) Oral daily  mupirocin 2% Ointment 1 Application(s) Topical <User Schedule>  pantoprazole    Tablet 40 milliGRAM(s) Oral before breakfast  polyethylene glycol 3350 17 Gram(s) Oral daily  senna 2 Tablet(s) Oral at bedtime  theophylline ER (24 Hour) 400 milliGRAM(s) Oral daily  tiotropium 18 MICROgram(s) Capsule 1 Capsule(s) Inhalation daily      TELEMETRY: 	    ECG:  	  RADIOLOGY:   DIAGNOSTIC TESTING:  [ ] Echocardiogram:  [ ]  Catheterization:  [ ] Stress Test:    OTHER: 	    LABS:	 	                            9.3    9.59  )-----------( 684      ( 11 Sep 2020 09:29 )             32.7     09-11    139  |  86<L>  |  28<H>  ----------------------------<  147<H>  3.6   |  44<H>  |  1.18    Ca    10.4      11 Sep 2020 09:29  Mg     2.1     09-11              1.

## 2020-09-11 NOTE — PROGRESS NOTE ADULT - ATTENDING COMMENTS
Agree with above NP note.  cv stable on continued bumex drip  volume status continues to improve with stable creat  d/c bumex drip today at 600 pm and start bumex 2mg ivp Q8 tomorrow am with hopeful transition to oral diuretics in 48-72 hours

## 2020-09-11 NOTE — PROGRESS NOTE ADULT - SUBJECTIVE AND OBJECTIVE BOX
Patient is a 60y old  Female who presents with a chief complaint of 60F p/w generalized weakness and difficulty walking (10 Sep 2020 11:28)      SUBJECTIVE / OVERNIGHT EVENTS: no acute events overnight, pt wore BiPAP during day and at night as well.     MEDICATIONS  (STANDING):  acetaminophen  IVPB .. 1000 milliGRAM(s) IV Intermittent once  albuterol/ipratropium for Nebulization 3 milliLiter(s) Nebulizer every 6 hours  apixaban 5 milliGRAM(s) Oral every 12 hours  ascorbic acid 500 milliGRAM(s) Oral daily  aspirin enteric coated 81 milliGRAM(s) Oral daily  atorvastatin 80 milliGRAM(s) Oral at bedtime  azithromycin   Tablet 250 milliGRAM(s) Oral <User Schedule>  Biotene Dry Mouth Oral Rinse 5 milliLiter(s) Swish and Spit two times a day  bisacodyl Suppository 10 milliGRAM(s) Rectal daily  budesonide 160 MICROgram(s)/formoterol 4.5 MICROgram(s) Inhaler 2 Puff(s) Inhalation two times a day  buMETAnide Infusion 0.5 mG/Hr (2.5 mL/Hr) IV Continuous <Continuous>  chlorhexidine 2% Cloths 1 Application(s) Topical daily  cholecalciferol 2000 Unit(s) Oral daily  dextrose 5%. 1000 milliLiter(s) (50 mL/Hr) IV Continuous <Continuous>  dextrose 50% Injectable 25 Gram(s) IV Push once  diltiazem    milliGRAM(s) Oral daily  ferrous    sulfate 325 milliGRAM(s) Oral two times a day  insulin glargine Injectable (LANTUS) 12 Unit(s) SubCutaneous at bedtime  insulin lispro (HumaLOG) corrective regimen sliding scale   SubCutaneous three times a day before meals  insulin lispro (HumaLOG) corrective regimen sliding scale   SubCutaneous at bedtime  insulin lispro Injectable (HumaLOG) 2 Unit(s) SubCutaneous three times a day with meals  lactulose Syrup 15 Gram(s) Oral two times a day  montelukast 10 milliGRAM(s) Oral daily  multivitamin 1 Tablet(s) Oral daily  mupirocin 2% Ointment 1 Application(s) Topical <User Schedule>  pantoprazole    Tablet 40 milliGRAM(s) Oral before breakfast  polyethylene glycol 3350 17 Gram(s) Oral daily  senna 2 Tablet(s) Oral at bedtime  theophylline ER (24 Hour) 400 milliGRAM(s) Oral daily  tiotropium 18 MICROgram(s) Capsule 1 Capsule(s) Inhalation daily    MEDICATIONS  (PRN):  glucagon  Injectable 1 milliGRAM(s) IntraMuscular once PRN Glucose LESS THAN 70 milligrams/deciliter  guaiFENesin   Syrup  (Sugar-Free) 100 milliGRAM(s) Oral every 6 hours PRN Cough      Vital Signs Last 24 Hrs  T(C): 36.6 (11 Sep 2020 08:45), Max: 37 (10 Sep 2020 19:56)  T(F): 97.9 (11 Sep 2020 08:45), Max: 98.6 (10 Sep 2020 19:56)  HR: 85 (11 Sep 2020 09:04) (69 - 91)  BP: 153/80 (11 Sep 2020 08:45) (130/70 - 153/80)  BP(mean): --  RR: 19 (11 Sep 2020 08:45) (18 - 19)  SpO2: 97% (11 Sep 2020 09:04) (97% - 100%)  CAPILLARY BLOOD GLUCOSE      POCT Blood Glucose.: 175 mg/dL (11 Sep 2020 10:20)  POCT Blood Glucose.: 248 mg/dL (10 Sep 2020 21:40)  POCT Blood Glucose.: 172 mg/dL (10 Sep 2020 20:23)  POCT Blood Glucose.: 167 mg/dL (10 Sep 2020 13:34)    I&O's Summary    10 Sep 2020 07:01  -  11 Sep 2020 07:00  --------------------------------------------------------  IN: 415 mL / OUT: 2050 mL / NET: -1635 mL    11 Sep 2020 07:01  -  11 Sep 2020 11:24  --------------------------------------------------------  IN: 0 mL / OUT: 650 mL / NET: -650 mL    PHYSICAL EXAM:  GENERAL: NAD  EYES:  conjunctiva and sclera clear  CHEST/LUNG: Clear to auscultation bilaterally; No wheeze  HEART: +s1/S2, reg   ABDOMEN: Soft, Nontender, Nondistended  EXTREMITIES:  +1 pitting edema up to the shins b/l   PSYCH: AAOx3      LABS:                        9.3    9.59  )-----------( 684      ( 11 Sep 2020 09:29 )             32.7     09-11    139  |  86<L>  |  28<H>  ----------------------------<  147<H>  3.6   |  44<H>  |  1.18    Ca    10.4      11 Sep 2020 09:29  Mg     2.1     09-11

## 2020-09-12 LAB
ANION GAP SERPL CALC-SCNC: 12 MMOL/L — SIGNIFICANT CHANGE UP (ref 5–17)
BUN SERPL-MCNC: 31 MG/DL — HIGH (ref 7–23)
CALCIUM SERPL-MCNC: 10.5 MG/DL — SIGNIFICANT CHANGE UP (ref 8.4–10.5)
CHLORIDE SERPL-SCNC: 86 MMOL/L — LOW (ref 96–108)
CO2 SERPL-SCNC: 40 MMOL/L — HIGH (ref 22–31)
CREAT SERPL-MCNC: 1.43 MG/DL — HIGH (ref 0.5–1.3)
GLUCOSE BLDC GLUCOMTR-MCNC: 163 MG/DL — HIGH (ref 70–99)
GLUCOSE BLDC GLUCOMTR-MCNC: 164 MG/DL — HIGH (ref 70–99)
GLUCOSE BLDC GLUCOMTR-MCNC: 207 MG/DL — HIGH (ref 70–99)
GLUCOSE BLDC GLUCOMTR-MCNC: 98 MG/DL — SIGNIFICANT CHANGE UP (ref 70–99)
GLUCOSE SERPL-MCNC: 140 MG/DL — HIGH (ref 70–99)
HCT VFR BLD CALC: 33.2 % — LOW (ref 34.5–45)
HGB BLD-MCNC: 9.8 G/DL — LOW (ref 11.5–15.5)
MCHC RBC-ENTMCNC: 24.9 PG — LOW (ref 27–34)
MCHC RBC-ENTMCNC: 29.5 GM/DL — LOW (ref 32–36)
MCV RBC AUTO: 84.5 FL — SIGNIFICANT CHANGE UP (ref 80–100)
NRBC # BLD: 0 /100 WBCS — SIGNIFICANT CHANGE UP (ref 0–0)
PLATELET # BLD AUTO: 664 K/UL — HIGH (ref 150–400)
POTASSIUM SERPL-MCNC: 4.1 MMOL/L — SIGNIFICANT CHANGE UP (ref 3.5–5.3)
POTASSIUM SERPL-SCNC: 4.1 MMOL/L — SIGNIFICANT CHANGE UP (ref 3.5–5.3)
RBC # BLD: 3.93 M/UL — SIGNIFICANT CHANGE UP (ref 3.8–5.2)
RBC # FLD: 18.3 % — HIGH (ref 10.3–14.5)
SODIUM SERPL-SCNC: 138 MMOL/L — SIGNIFICANT CHANGE UP (ref 135–145)
WBC # BLD: 10.79 K/UL — HIGH (ref 3.8–10.5)
WBC # FLD AUTO: 10.79 K/UL — HIGH (ref 3.8–10.5)

## 2020-09-12 PROCEDURE — 99233 SBSQ HOSP IP/OBS HIGH 50: CPT

## 2020-09-12 RX ORDER — MAGNESIUM HYDROXIDE 400 MG/1
30 TABLET, CHEWABLE ORAL DAILY
Refills: 0 | Status: DISCONTINUED | OUTPATIENT
Start: 2020-09-12 | End: 2020-09-14

## 2020-09-12 RX ADMIN — CHLORHEXIDINE GLUCONATE 1 APPLICATION(S): 213 SOLUTION TOPICAL at 13:33

## 2020-09-12 RX ADMIN — Medication 1: at 13:32

## 2020-09-12 RX ADMIN — Medication 500 MILLIGRAM(S): at 13:30

## 2020-09-12 RX ADMIN — Medication 3 MILLILITER(S): at 13:30

## 2020-09-12 RX ADMIN — TIOTROPIUM BROMIDE 1 CAPSULE(S): 18 CAPSULE ORAL; RESPIRATORY (INHALATION) at 13:29

## 2020-09-12 RX ADMIN — Medication 2000 UNIT(S): at 13:29

## 2020-09-12 RX ADMIN — MONTELUKAST 10 MILLIGRAM(S): 4 TABLET, CHEWABLE ORAL at 13:29

## 2020-09-12 RX ADMIN — BUDESONIDE AND FORMOTEROL FUMARATE DIHYDRATE 2 PUFF(S): 160; 4.5 AEROSOL RESPIRATORY (INHALATION) at 06:40

## 2020-09-12 RX ADMIN — Medication 2 UNIT(S): at 13:31

## 2020-09-12 RX ADMIN — INSULIN GLARGINE 12 UNIT(S): 100 INJECTION, SOLUTION SUBCUTANEOUS at 22:46

## 2020-09-12 RX ADMIN — PANTOPRAZOLE SODIUM 40 MILLIGRAM(S): 20 TABLET, DELAYED RELEASE ORAL at 06:40

## 2020-09-12 RX ADMIN — Medication 81 MILLIGRAM(S): at 13:29

## 2020-09-12 RX ADMIN — Medication 5 MILLILITER(S): at 06:40

## 2020-09-12 RX ADMIN — Medication 1: at 09:45

## 2020-09-12 RX ADMIN — Medication 5 MILLILITER(S): at 18:07

## 2020-09-12 RX ADMIN — Medication 3 MILLILITER(S): at 18:08

## 2020-09-12 RX ADMIN — BUDESONIDE AND FORMOTEROL FUMARATE DIHYDRATE 2 PUFF(S): 160; 4.5 AEROSOL RESPIRATORY (INHALATION) at 18:08

## 2020-09-12 RX ADMIN — Medication 10 MILLIGRAM(S): at 13:31

## 2020-09-12 RX ADMIN — OXYCODONE HYDROCHLORIDE 5 MILLIGRAM(S): 5 TABLET ORAL at 00:30

## 2020-09-12 RX ADMIN — LACTULOSE 15 GRAM(S): 10 SOLUTION ORAL at 18:08

## 2020-09-12 RX ADMIN — Medication 400 MILLIGRAM(S): at 13:29

## 2020-09-12 RX ADMIN — Medication 180 MILLIGRAM(S): at 06:40

## 2020-09-12 RX ADMIN — Medication 325 MILLIGRAM(S): at 18:07

## 2020-09-12 RX ADMIN — APIXABAN 5 MILLIGRAM(S): 2.5 TABLET, FILM COATED ORAL at 18:07

## 2020-09-12 RX ADMIN — Medication 2 UNIT(S): at 09:45

## 2020-09-12 RX ADMIN — Medication 1 TABLET(S): at 13:29

## 2020-09-12 RX ADMIN — SENNA PLUS 2 TABLET(S): 8.6 TABLET ORAL at 22:46

## 2020-09-12 RX ADMIN — MUPIROCIN 1 APPLICATION(S): 20 OINTMENT TOPICAL at 09:00

## 2020-09-12 RX ADMIN — LACTULOSE 15 GRAM(S): 10 SOLUTION ORAL at 06:40

## 2020-09-12 RX ADMIN — OXYCODONE HYDROCHLORIDE 5 MILLIGRAM(S): 5 TABLET ORAL at 00:00

## 2020-09-12 RX ADMIN — APIXABAN 5 MILLIGRAM(S): 2.5 TABLET, FILM COATED ORAL at 06:40

## 2020-09-12 RX ADMIN — POLYETHYLENE GLYCOL 3350 17 GRAM(S): 17 POWDER, FOR SOLUTION ORAL at 13:31

## 2020-09-12 RX ADMIN — Medication 3 MILLILITER(S): at 00:00

## 2020-09-12 RX ADMIN — ATORVASTATIN CALCIUM 80 MILLIGRAM(S): 80 TABLET, FILM COATED ORAL at 22:46

## 2020-09-12 RX ADMIN — Medication 3 MILLILITER(S): at 06:40

## 2020-09-12 NOTE — PROGRESS NOTE ADULT - PROBLEM SELECTOR PLAN 2
- Monitor BMP, Mg, Phos Q12 hrs and replete as needed   - Diamox on hold for now.  - Strict ins and outs, f/u bladder scan today   - LE edema is improving  - hold Bumex for now given rising BUN and creatinine

## 2020-09-12 NOTE — PROGRESS NOTE ADULT - ASSESSMENT
60F w/ end stage COPD on 3LNC (pending transplant list at [facility]), former smoker, CAD s/p stent (2016), afib on eliquis, uncontrolled DM2, raynaud phenomenon and recent hospitalization at [facility] for altered mental status and AUR[facility]A presents to [facility] for evaluation of generalized weakness and difficulty walking admit to medicine for further evaluation.    Copd exacerbation: with hypoxic and hypercarbic resp failure  CAD: s/p stent  a Fibrillation  uncontrolled dm   Raynaud's PHENOMENON  [place]    She is on appropriate rx of copd at this time including theophylline  would check its levels :  Cont BD:   off steroids at this time:  No pulm htn pn ECHO:  She is already on full dose AC for a fibrillation:  She is on bipap at 15 /5: abg NOTED:  hco3 is creeping  up: need to be watched may need diamox: ph is 7.40  can she be transferred to [facility]: she did go there before and was supposed to finish few tests prior to that   She is on IV Bumex:   niko brother and team     9/4:  her hco3 is increasing:   give 250 mg of Diamox today :  this may have to be repeated every day for next few days depending upon her hco3:  she is being agressively diuresed:  and her leg edema is much better:  for now to cont curretn rx:  Her theophylline levels are fine:  NIKO PMD       9/8:  clinically she is doing better:  her hco3 has slowly started creeping up again:  do ABG in AM: will repeat diamox tomorrow depending upon his hco3 as well as ph   CONT DIURESIS  she kerr feel better clinically   her leg edema is better then before     9/9:  ac on chr hypercarbic resp failure:  HCO3 is high but NO DIAMOX:  Cont diureses;  she needs to use more of bipap in daytime as wellas full ti me at night ti me:  Likely more hypercarbic secondary to not using bipap yesterday n the aytime: encouraged her to use bipap more in the daytime today too:  overallprognosis is poor:  niko NP     9/10:  doing same:  her ABG today is better:  She has ac on chr hypoercapnic resp fialure secondary to COPD:   cONT DIURESIS:  NO DIAMOX:   DVT propahyxlis:  Cont oxygen to mantain o2 sao2 above 885:  NIKO PMD    9/11:  doing same:  gets SOB off bipap :  shmadhavi would need bipap 24 hours a day :   last ABg with increase in PH:   Cont diuresis per primary team   HCO3 mildly increased but last ph was still acidotic: no diamox for now:  rpt ABG jeremy the weekend:   NIKO pt       9/12:  She is doing ok : no SOB : no wheezing:  Cont bipap and BD :  dvt propahyxlis  hco3 today sis 40 :  cont to monitor: for dc on monday to rehab:  NIKO pt

## 2020-09-12 NOTE — PROGRESS NOTE ADULT - ASSESSMENT
60F w COPD on 3LNC (?pending transplant list at [facility]), former smoker, CAD s/p stent (2016), afib on eliquis, uncontrolled DM2, raynaud phenomenon and recent hospitalization at [facility] for altered mental status and AURORA aw COPD exacerbation, LE swelling, weakness.     Prolonged hospitalization.

## 2020-09-12 NOTE — PROGRESS NOTE ADULT - SUBJECTIVE AND OBJECTIVE BOX
CARDIOLOGY FOLLOW UP - Dr. Brunson    CC no acute complaints   oob with bipap       PHYSICAL EXAM:  T(C): 36.8 (09-11-20 @ 12:00), Max: 36.8 (09-11-20 @ 12:00)  HR: 86 (09-11-20 @ 15:17) (74 - 86)  BP: 131/73 (09-11-20 @ 12:00) (131/73 - 131/73)  RR: 18 (09-11-20 @ 12:00) (18 - 18)  SpO2: 97% (09-11-20 @ 15:17) (97% - 100%)  Wt(kg): --  I&O's Summary    11 Sep 2020 07:01  -  12 Sep 2020 07:00  --------------------------------------------------------  IN: 0 mL / OUT: 1900 mL / NET: -1900 mL    12 Sep 2020 07:01  -  12 Sep 2020 11:20  --------------------------------------------------------  IN: 240 mL / OUT: 0 mL / NET: 240 mL        Appearance: Normal	  Cardiovascular: Normal S1 S2,RRR, No JVD, No murmurs  Respiratory: diminished   Gastrointestinal:  Soft, Non-tender, + BS	  Extremities: Normal range of motion, No clubbing, b/;l le edema improved         MEDICATIONS  (STANDING):  acetaminophen  IVPB .. 1000 milliGRAM(s) IV Intermittent once  albuterol/ipratropium for Nebulization 3 milliLiter(s) Nebulizer every 6 hours  apixaban 5 milliGRAM(s) Oral every 12 hours  ascorbic acid 500 milliGRAM(s) Oral daily  aspirin enteric coated 81 milliGRAM(s) Oral daily  atorvastatin 80 milliGRAM(s) Oral at bedtime  azithromycin   Tablet 250 milliGRAM(s) Oral <User Schedule>  Biotene Dry Mouth Oral Rinse 5 milliLiter(s) Swish and Spit two times a day  bisacodyl Suppository 10 milliGRAM(s) Rectal daily  budesonide 160 MICROgram(s)/formoterol 4.5 MICROgram(s) Inhaler 2 Puff(s) Inhalation two times a day  chlorhexidine 2% Cloths 1 Application(s) Topical daily  cholecalciferol 2000 Unit(s) Oral daily  dextrose 5%. 1000 milliLiter(s) (50 mL/Hr) IV Continuous <Continuous>  dextrose 50% Injectable 25 Gram(s) IV Push once  diltiazem    milliGRAM(s) Oral daily  ferrous    sulfate 325 milliGRAM(s) Oral two times a day  insulin glargine Injectable (LANTUS) 12 Unit(s) SubCutaneous at bedtime  insulin lispro (HumaLOG) corrective regimen sliding scale   SubCutaneous three times a day before meals  insulin lispro (HumaLOG) corrective regimen sliding scale   SubCutaneous at bedtime  insulin lispro Injectable (HumaLOG) 2 Unit(s) SubCutaneous three times a day with meals  lactulose Syrup 15 Gram(s) Oral two times a day  montelukast 10 milliGRAM(s) Oral daily  multivitamin 1 Tablet(s) Oral daily  mupirocin 2% Ointment 1 Application(s) Topical <User Schedule>  pantoprazole    Tablet 40 milliGRAM(s) Oral before breakfast  polyethylene glycol 3350 17 Gram(s) Oral daily  senna 2 Tablet(s) Oral at bedtime  theophylline ER (24 Hour) 400 milliGRAM(s) Oral daily  tiotropium 18 MICROgram(s) Capsule 1 Capsule(s) Inhalation daily      TELEMETRY: 	    ECG:  	  RADIOLOGY:   DIAGNOSTIC TESTING:  [ ] Echocardiogram:  [ ]  Catheterization:  [ ] Stress Test:    OTHER: 	    LABS:	 	                                9.8    10.79 )-----------( 664      ( 12 Sep 2020 09:34 )             33.2     09-12    138  |  86<L>  |  31<H>  ----------------------------<  140<H>  4.1   |  40<H>  |  1.43<H>    Ca    10.5      12 Sep 2020 09:34  Mg     2.1     09-11

## 2020-09-12 NOTE — PROGRESS NOTE ADULT - SUBJECTIVE AND OBJECTIVE BOX
Patient is a 60y old  Female who presents with a chief complaint of 60F p/w generalized weakness and difficulty walking (12 Sep 2020 11:19)      Any change in ROS: doingok : feels SOB    MEDICATIONS  (STANDING):  acetaminophen  IVPB .. 1000 milliGRAM(s) IV Intermittent once  albuterol/ipratropium for Nebulization 3 milliLiter(s) Nebulizer every 6 hours  apixaban 5 milliGRAM(s) Oral every 12 hours  ascorbic acid 500 milliGRAM(s) Oral daily  aspirin enteric coated 81 milliGRAM(s) Oral daily  atorvastatin 80 milliGRAM(s) Oral at bedtime  azithromycin   Tablet 250 milliGRAM(s) Oral <User Schedule>  Biotene Dry Mouth Oral Rinse 5 milliLiter(s) Swish and Spit two times a day  bisacodyl Suppository 10 milliGRAM(s) Rectal daily  budesonide 160 MICROgram(s)/formoterol 4.5 MICROgram(s) Inhaler 2 Puff(s) Inhalation two times a day  chlorhexidine 2% Cloths 1 Application(s) Topical daily  cholecalciferol 2000 Unit(s) Oral daily  dextrose 5%. 1000 milliLiter(s) (50 mL/Hr) IV Continuous <Continuous>  dextrose 50% Injectable 25 Gram(s) IV Push once  diltiazem    milliGRAM(s) Oral daily  ferrous    sulfate 325 milliGRAM(s) Oral two times a day  insulin glargine Injectable (LANTUS) 12 Unit(s) SubCutaneous at bedtime  insulin lispro (HumaLOG) corrective regimen sliding scale   SubCutaneous three times a day before meals  insulin lispro (HumaLOG) corrective regimen sliding scale   SubCutaneous at bedtime  insulin lispro Injectable (HumaLOG) 2 Unit(s) SubCutaneous three times a day with meals  lactulose Syrup 15 Gram(s) Oral two times a day  montelukast 10 milliGRAM(s) Oral daily  multivitamin 1 Tablet(s) Oral daily  mupirocin 2% Ointment 1 Application(s) Topical <User Schedule>  pantoprazole    Tablet 40 milliGRAM(s) Oral before breakfast  polyethylene glycol 3350 17 Gram(s) Oral daily  senna 2 Tablet(s) Oral at bedtime  theophylline ER (24 Hour) 400 milliGRAM(s) Oral daily  tiotropium 18 MICROgram(s) Capsule 1 Capsule(s) Inhalation daily    MEDICATIONS  (PRN):  glucagon  Injectable 1 milliGRAM(s) IntraMuscular once PRN Glucose LESS THAN 70 milligrams/deciliter  guaiFENesin   Syrup  (Sugar-Free) 100 milliGRAM(s) Oral every 6 hours PRN Cough  oxyCODONE    IR 5 milliGRAM(s) Oral every 6 hours PRN Severe Pain (7 - 10)    Vital Signs Last 24 Hrs  T(C): 36.8 (11 Sep 2020 12:00), Max: 36.8 (11 Sep 2020 12:00)  T(F): 98.2 (11 Sep 2020 12:00), Max: 98.2 (11 Sep 2020 12:00)  HR: 86 (11 Sep 2020 15:17) (74 - 86)  BP: 131/73 (11 Sep 2020 12:00) (131/73 - 131/73)  BP(mean): --  RR: 18 (11 Sep 2020 12:00) (18 - 18)  SpO2: 97% (11 Sep 2020 15:17) (97% - 100%)    I&O's Summary    11 Sep 2020 07:01  -  12 Sep 2020 07:00  --------------------------------------------------------  IN: 0 mL / OUT: 1900 mL / NET: -1900 mL    12 Sep 2020 07:01  -  12 Sep 2020 11:51  --------------------------------------------------------  IN: 240 mL / OUT: 0 mL / NET: 240 mL          Physical Exam:   GENERAL: NAD, well-groomed, well-developed  HEENT: MITCH/   Atraumatic, Normocephalic  ENMT: No tonsillar erythema, exudates, or enlargement; Moist mucous membranes, Good dentition, No lesions  NECK: Supple, No JVD, Normal thyroid  CHEST/LUNG: poor air entry   CVS: Regular rate and rhythm; No murmurs, rubs, or gallops  GI: : Soft, Nontender, Nondistended; Bowel sounds present  NERVOUS SYSTEM:  Alert & Oriented X3  EXTREMITIES:  2+ Peripheral Pulses, No clubbing, cyanosis, or edema  LYMPH: No lymphadenopathy noted  SKIN: No rashes or lesions  ENDOCRINOLOGY: No Thyromegaly  PSYCH: Appropriate    Labs:                              9.8    10.79 )-----------( 664      ( 12 Sep 2020 09:34 )             33.2                         9.3    9.59  )-----------( 684      ( 11 Sep 2020 09:29 )             32.7                         9.0    9.63  )-----------( 595      ( 10 Sep 2020 08:49 )             30.9                         8.5    7.57  )-----------( 620      ( 09 Sep 2020 08:53 )             30.1     09-12    138  |  86<L>  |  31<H>  ----------------------------<  140<H>  4.1   |  40<H>  |  1.43<H>  09-11    139  |  86<L>  |  28<H>  ----------------------------<  147<H>  3.6   |  44<H>  |  1.18  09-10    140  |  87<L>  |  25<H>  ----------------------------<  161<H>  3.8   |  43<H>  |  1.07  09-09    142  |  91<L>  |  23  ----------------------------<  158<H>  3.7   |  43<H>  |  1.23    Ca    10.5      12 Sep 2020 09:34  Ca    10.4      11 Sep 2020 09:29  Mg     2.1     09-11      CAPILLARY BLOOD GLUCOSE      POCT Blood Glucose.: 163 mg/dL (12 Sep 2020 09:30)  POCT Blood Glucose.: 141 mg/dL (11 Sep 2020 21:06)  POCT Blood Glucose.: 152 mg/dL (11 Sep 2020 15:24)    < from: Xray Chest 1 View- PORTABLE-Routine (Xray Chest 1 View- PORTABLE-Routine .) (08.01.20 @ 16:44) >    EXAM:  XR CHEST PORTABLE ROUTINE 1V                            PROCEDURE DATE:  08/01/2020            INTERPRETATION:  CLINICAL INFORMATION: Screening, history COPD.    TECHNIQUE: Single portable view of the chest.    COMPARISON: 12/11/2019.      IMPRESSION:    The lungs are clear.  Heart size cannot be accurately assessed in this projection.  No acute bone abnormality.              GOVIND SIMMONS M.D., RESIDENT RADIOLOGIST  This document has been electronically signed.  CHELSEY SMITH M.D., ATTENDING RADIOLOGIST  This document has been electronically signed. Aug  2 2020  9:05AM              < end of copied text >                RECENT CULTURES:        RESPIRATORY CULTURES:          Studies  Chest X-RAY  CT SCAN Chest   Venous Dopplers: LE:   CT Abdomen  Others

## 2020-09-12 NOTE — PROGRESS NOTE ADULT - PROBLEM SELECTOR PLAN 1
- Dyspnea multifactorial in the setting of underlying lung disease, cardiovascular   dysfunction, obesity, and deconditioning.   - Was given prolonged course of Prednisone, now tapered off. Overnight and prn   Bipap. Pt comfortable off Bipap and able to speak in full sentences.   - cont azithro, duonebs, spiriva, symbicort, singulair  - Continue supplemental O2 with goal O2 sat > 88% - she does not need to be at   100%.  -as per discussion with Dr. Mathew, pt was getting evaluated at [facility] by Dr. Cervantes for lung transplant. However she is NOT listed currently to receive a transplant.  f/u with pulm regarding BiPAP

## 2020-09-12 NOTE — PROGRESS NOTE ADULT - SUBJECTIVE AND OBJECTIVE BOX
Patient is a 60y old  Female who presents with a chief complaint of 60F p/w generalized weakness and difficulty walking (12 Sep 2020 14:46)       INTERVAL HPI/OVERNIGHT EVENTS:  Patient seen and evaluated at bedside.  Pt is resting comfortable in NAD. Denies N/V/F/C.     Allergies    No Known Allergies    Intolerances    albuterol (Unknown)      Vital Signs Last 24 Hrs  T(C): 36.6 (12 Sep 2020 16:37), Max: 36.8 (12 Sep 2020 12:34)  T(F): 97.8 (12 Sep 2020 16:37), Max: 98.3 (12 Sep 2020 12:34)  HR: 78 (12 Sep 2020 16:37) (76 - 78)  BP: 164/94 (12 Sep 2020 16:37) (147/69 - 164/94)  BP(mean): --  RR: 18 (12 Sep 2020 16:37) (18 - 18)  SpO2: 100% (12 Sep 2020 16:37) (99% - 100%)    LABS:                        9.8    10.79 )-----------( 664      ( 12 Sep 2020 09:34 )             33.2     09-12    138  |  86<L>  |  31<H>  ----------------------------<  140<H>  4.1   |  40<H>  |  1.43<H>    Ca    10.5      12 Sep 2020 09:34  Mg     2.1     09-11          CAPILLARY BLOOD GLUCOSE      POCT Blood Glucose.: 98 mg/dL (12 Sep 2020 17:56)  POCT Blood Glucose.: 164 mg/dL (12 Sep 2020 13:25)  POCT Blood Glucose.: 163 mg/dL (12 Sep 2020 09:30)  POCT Blood Glucose.: 141 mg/dL (11 Sep 2020 21:06)      Lower Extremity Physical Exam:  Vascular: DP/PT n/p, B/L with known h/o raynauds, CFT <_5 seconds B/L, Temperature gradient _warm to cool, B/L.   Neuro: Epicritic sensation intact to the level of _toes, B/L.  Skin: Postive mycotic toenails x10  Wound #1:   Location: dorsum left forefoot  Size: 6cm diameter  Depth: superficial  Wound bed: ruptured bullae  Drainage:  clear serous fluid  Odor: none  Periwound: no clinical signs of infection  Etiology: bullae/dm  Erythema resolving to the left foot	     Location: dorsum right forefoot  Size: 5cm diameter  Depth: superficial  Wound bed: ruptured bullae  Drainage:  clear serous fluid/fluctuant  Odor: none  Periwound: no clinical signs of infection  Etiology: bullae/dm

## 2020-09-12 NOTE — PROGRESS NOTE ADULT - ASSESSMENT
--Left dm foot bullae secondary to fluid retention    --erythema resolved at this time to the left foot, erythema not extending past bulla area  --wound culture reviewed, growing skin richie   --recommend continued daily mupirocin with adaptic and dsd left forefoot daily  --ACE compression bilat if tolerated by patient  --recommend continue podiatric footcare as outpatient with current dpm  --will monitor during this admission

## 2020-09-12 NOTE — PROGRESS NOTE ADULT - SUBJECTIVE AND OBJECTIVE BOX
Patient is a 60y old  Female who presents with a chief complaint of 60F p/w generalized weakness and difficulty walking (12 Sep 2020 11:50)      INTERVAL History of Present Illness/OVERNIGHT EVENTS: irritable mood at baseline   reports no BM for 2 days  Bumex stopped due to elevated creatinine    MEDICATIONS  (STANDING):  acetaminophen  IVPB .. 1000 milliGRAM(s) IV Intermittent once  albuterol/ipratropium for Nebulization 3 milliLiter(s) Nebulizer every 6 hours  apixaban 5 milliGRAM(s) Oral every 12 hours  ascorbic acid 500 milliGRAM(s) Oral daily  aspirin enteric coated 81 milliGRAM(s) Oral daily  atorvastatin 80 milliGRAM(s) Oral at bedtime  azithromycin   Tablet 250 milliGRAM(s) Oral <User Schedule>  Biotene Dry Mouth Oral Rinse 5 milliLiter(s) Swish and Spit two times a day  bisacodyl Suppository 10 milliGRAM(s) Rectal daily  budesonide 160 MICROgram(s)/formoterol 4.5 MICROgram(s) Inhaler 2 Puff(s) Inhalation two times a day  chlorhexidine 2% Cloths 1 Application(s) Topical daily  cholecalciferol 2000 Unit(s) Oral daily  dextrose 5%. 1000 milliLiter(s) (50 mL/Hr) IV Continuous <Continuous>  dextrose 50% Injectable 25 Gram(s) IV Push once  diltiazem    milliGRAM(s) Oral daily  ferrous    sulfate 325 milliGRAM(s) Oral two times a day  insulin glargine Injectable (LANTUS) 12 Unit(s) SubCutaneous at bedtime  insulin lispro (HumaLOG) corrective regimen sliding scale   SubCutaneous three times a day before meals  insulin lispro (HumaLOG) corrective regimen sliding scale   SubCutaneous at bedtime  insulin lispro Injectable (HumaLOG) 2 Unit(s) SubCutaneous three times a day with meals  lactulose Syrup 15 Gram(s) Oral two times a day  montelukast 10 milliGRAM(s) Oral daily  multivitamin 1 Tablet(s) Oral daily  mupirocin 2% Ointment 1 Application(s) Topical <User Schedule>  pantoprazole    Tablet 40 milliGRAM(s) Oral before breakfast  polyethylene glycol 3350 17 Gram(s) Oral daily  senna 2 Tablet(s) Oral at bedtime  theophylline ER (24 Hour) 400 milliGRAM(s) Oral daily  tiotropium 18 MICROgram(s) Capsule 1 Capsule(s) Inhalation daily    MEDICATIONS  (PRN):  glucagon  Injectable 1 milliGRAM(s) IntraMuscular once PRN Glucose LESS THAN 70 milligrams/deciliter  guaiFENesin   Syrup  (Sugar-Free) 100 milliGRAM(s) Oral every 6 hours PRN Cough  magnesium hydroxide Suspension 30 milliLiter(s) Oral daily PRN Constipation  oxyCODONE    IR 5 milliGRAM(s) Oral every 6 hours PRN Severe Pain (7 - 10)      Allergies    No Known Allergies    Intolerances    albuterol (Unknown)      REVIEW OF SYSTEMS:  Negative unless otherwise specified above.    Vital Signs Last 24 Hrs  T(C): 36.8 (12 Sep 2020 12:34), Max: 36.8 (12 Sep 2020 12:34)  T(F): 98.3 (12 Sep 2020 12:34), Max: 98.3 (12 Sep 2020 12:34)  HR: 76 (12 Sep 2020 12:34) (76 - 86)  BP: 147/69 (12 Sep 2020 12:34) (147/69 - 147/69)  BP(mean): --  RR: 18 (12 Sep 2020 12:34) (18 - 18)  SpO2: 99% (12 Sep 2020 12:34) (97% - 99%)        PHYSICAL EXAM:  GENERAL: No apparent distress, appears chronically ill, No SOB  HEAD:  Atraumatic, Normocephalic  EYES: Conjunctiva and sclera clear, no discharge  ENMT: Moist mucous membranes, no nasal discharge  NECK: Supple, no JVD  CHEST/LUNG: Distant breath sounds  HEART: Regular rhythm, no rubs or gallops  ABDOMEN: Soft, Nontender, Nondistended; Bowel sounds present  EXTREMITIES:  No clubbing, cyanosis but mild distal leg edema  SKIN: No rash or new discoloration  NERVOUS SYSTEM:  Alert & Oriented; Bilateral Lower extremity mobile, sensation to light touch intact      LABS:                        9.8    10.79 )-----------( 664      ( 12 Sep 2020 09:34 )             33.2     12 Sep 2020 09:34    138    |  86     |  31     ----------------------------<  140    4.1     |  40     |  1.43     Ca    10.5       12 Sep 2020 09:34          CAPILLARY BLOOD GLUCOSE      POCT Blood Glucose.: 164 mg/dL (12 Sep 2020 13:25)  POCT Blood Glucose.: 163 mg/dL (12 Sep 2020 09:30)  POCT Blood Glucose.: 141 mg/dL (11 Sep 2020 21:06)  POCT Blood Glucose.: 152 mg/dL (11 Sep 2020 15:24)      RADIOLOGY & ADDITIONAL TESTS:    Images reviewed personally    Consultant Notes Reviewed and Care Discussed with relevant Consultants/Other Providers.

## 2020-09-12 NOTE — PROGRESS NOTE ADULT - ATTENDING COMMENTS
plan of care discussed with floor NP/PA   patient with end stage COPD; higher risk for future complications despite optimal medical therapy

## 2020-09-12 NOTE — PROGRESS NOTE ADULT - ATTENDING COMMENTS
Agree with above NP note.  cv stable  bumex gtt  cr bump due to effective diuresis  if cr remains stable start bumex 2mg PO BID tomorrow

## 2020-09-12 NOTE — PROGRESS NOTE ADULT - ASSESSMENT
EVAN 1/7/19: no ALINA thrombus, unable to assess LV sys fx  echo 12/7/18: EF 71%, nl LV sys fx, mild diastolic dysfx     a/p    60 year old female with hx of D CHF, HTN, CAD s/p PCI, Afib/ aflutter, s/p Aflutter Ablation 12/2019, COPD, DM2, Anxiety, , raynaud phenomenon presenting with weakness, SOB , hyperkalemia.        1. Acute on Chronic Diastolic CHF   -volume status continues to improve, reports improved dyspnea  -bumex gtt stopped 2/2 to increase in renal function   -hold diuresis for now, resume PO bumex 2mg daily on monday   -close monitoring of electrolytes, renal function, and C02  -diamox prn   -echo with normal LVEF     2. Dyspnea, Resp failure  -secondary to chronic lung disease, COPD, volume overload  -pulm f/u   -covid 19 negative  -ecg with known RBBB, new twi noted, hyperkalemia also noted on admission  -echo with normal LVEF, mild diastolic dysfunction   -s/p PO Prednisone, on bumex gtt   -on Duoneb, Symbicort, Spiriva and Theophylline.    3. CAD s/p PCI   -stable, no cp  -c/w ASA, statin  -echo noted above     4. Afib/aflutter s/p  Aflutter Ablation 12/2019  -in NSR, cw cardizem  mg daily  -CHADsVac=2, c/w eliquis       dvt ppx     EVAN 1/7/19: no ALINA thrombus, unable to assess LV sys fx  echo 12/7/18: EF 71%, nl LV sys fx, mild diastolic dysfx     a/p    60 year old female with hx of D CHF, HTN, CAD s/p PCI, Afib/ aflutter, s/p Aflutter Ablation 12/2019, COPD, DM2, Anxiety, , raynaud phenomenon presenting with weakness, SOB , hyperkalemia.        1. Acute on Chronic Diastolic CHF   -volume status continues to improve, reports improved dyspnea  -bumex gtt stopped 2/2 to increase in renal function  -cr elevation related to effective diuresis, would transition to Bumex 2mg PO BID starting tomorrow if cr stablizes  -close monitoring of electrolytes, renal function, and C02  -diamox prn   -echo with normal LVEF     2. Dyspnea, Resp failure  -secondary to chronic lung disease, COPD, volume overload  -pulm f/u   -covid 19 negative  -ecg with known RBBB, new twi noted, hyperkalemia also noted on admission  -echo with normal LVEF, mild diastolic dysfunction   -s/p PO Prednisone, on bumex gtt   -on Duoneb, Symbicort, Spiriva and Theophylline.    3. CAD s/p PCI   -stable, no cp  -c/w ASA, statin  -echo noted above     4. Afib/aflutter s/p  Aflutter Ablation 12/2019  -in NSR, cw cardizem  mg daily  -CHADsVac=2, c/w eliquis       dvt ppx

## 2020-09-13 LAB
ALBUMIN SERPL ELPH-MCNC: 3.5 G/DL — SIGNIFICANT CHANGE UP (ref 3.3–5)
ALP SERPL-CCNC: 65 U/L — SIGNIFICANT CHANGE UP (ref 40–120)
ALT FLD-CCNC: <5 U/L — LOW (ref 10–45)
ANION GAP SERPL CALC-SCNC: 10 MMOL/L — SIGNIFICANT CHANGE UP (ref 5–17)
AST SERPL-CCNC: 17 U/L — SIGNIFICANT CHANGE UP (ref 10–40)
BILIRUB SERPL-MCNC: 0.3 MG/DL — SIGNIFICANT CHANGE UP (ref 0.2–1.2)
BUN SERPL-MCNC: 27 MG/DL — HIGH (ref 7–23)
CALCIUM SERPL-MCNC: 9.9 MG/DL — SIGNIFICANT CHANGE UP (ref 8.4–10.5)
CHLORIDE SERPL-SCNC: 89 MMOL/L — LOW (ref 96–108)
CO2 SERPL-SCNC: 38 MMOL/L — HIGH (ref 22–31)
CREAT SERPL-MCNC: 1.01 MG/DL — SIGNIFICANT CHANGE UP (ref 0.5–1.3)
GLUCOSE BLDC GLUCOMTR-MCNC: 144 MG/DL — HIGH (ref 70–99)
GLUCOSE BLDC GLUCOMTR-MCNC: 156 MG/DL — HIGH (ref 70–99)
GLUCOSE BLDC GLUCOMTR-MCNC: 199 MG/DL — HIGH (ref 70–99)
GLUCOSE BLDC GLUCOMTR-MCNC: 203 MG/DL — HIGH (ref 70–99)
GLUCOSE SERPL-MCNC: 146 MG/DL — HIGH (ref 70–99)
HCT VFR BLD CALC: 30 % — LOW (ref 34.5–45)
HGB BLD-MCNC: 8.7 G/DL — LOW (ref 11.5–15.5)
MCHC RBC-ENTMCNC: 24.4 PG — LOW (ref 27–34)
MCHC RBC-ENTMCNC: 29 GM/DL — LOW (ref 32–36)
MCV RBC AUTO: 84 FL — SIGNIFICANT CHANGE UP (ref 80–100)
NRBC # BLD: 0 /100 WBCS — SIGNIFICANT CHANGE UP (ref 0–0)
PLATELET # BLD AUTO: 481 K/UL — HIGH (ref 150–400)
POTASSIUM SERPL-MCNC: 3.8 MMOL/L — SIGNIFICANT CHANGE UP (ref 3.5–5.3)
POTASSIUM SERPL-SCNC: 3.8 MMOL/L — SIGNIFICANT CHANGE UP (ref 3.5–5.3)
PROT SERPL-MCNC: 5.9 G/DL — LOW (ref 6–8.3)
RBC # BLD: 3.57 M/UL — LOW (ref 3.8–5.2)
RBC # FLD: 18.1 % — HIGH (ref 10.3–14.5)
SARS-COV-2 RNA SPEC QL NAA+PROBE: SIGNIFICANT CHANGE UP
SODIUM SERPL-SCNC: 137 MMOL/L — SIGNIFICANT CHANGE UP (ref 135–145)
WBC # BLD: 12.7 K/UL — HIGH (ref 3.8–10.5)
WBC # FLD AUTO: 12.7 K/UL — HIGH (ref 3.8–10.5)

## 2020-09-13 PROCEDURE — 99233 SBSQ HOSP IP/OBS HIGH 50: CPT

## 2020-09-13 RX ORDER — BUMETANIDE 0.25 MG/ML
2 INJECTION INTRAMUSCULAR; INTRAVENOUS EVERY 12 HOURS
Refills: 0 | Status: DISCONTINUED | OUTPATIENT
Start: 2020-09-13 | End: 2020-09-14

## 2020-09-13 RX ADMIN — Medication 3 MILLILITER(S): at 06:17

## 2020-09-13 RX ADMIN — CHLORHEXIDINE GLUCONATE 1 APPLICATION(S): 213 SOLUTION TOPICAL at 13:48

## 2020-09-13 RX ADMIN — ATORVASTATIN CALCIUM 80 MILLIGRAM(S): 80 TABLET, FILM COATED ORAL at 22:43

## 2020-09-13 RX ADMIN — BUMETANIDE 2 MILLIGRAM(S): 0.25 INJECTION INTRAMUSCULAR; INTRAVENOUS at 18:38

## 2020-09-13 RX ADMIN — MUPIROCIN 1 APPLICATION(S): 20 OINTMENT TOPICAL at 09:07

## 2020-09-13 RX ADMIN — MONTELUKAST 10 MILLIGRAM(S): 4 TABLET, CHEWABLE ORAL at 13:57

## 2020-09-13 RX ADMIN — APIXABAN 5 MILLIGRAM(S): 2.5 TABLET, FILM COATED ORAL at 18:09

## 2020-09-13 RX ADMIN — Medication 500 MILLIGRAM(S): at 14:06

## 2020-09-13 RX ADMIN — LACTULOSE 15 GRAM(S): 10 SOLUTION ORAL at 18:09

## 2020-09-13 RX ADMIN — PANTOPRAZOLE SODIUM 40 MILLIGRAM(S): 20 TABLET, DELAYED RELEASE ORAL at 06:17

## 2020-09-13 RX ADMIN — APIXABAN 5 MILLIGRAM(S): 2.5 TABLET, FILM COATED ORAL at 06:17

## 2020-09-13 RX ADMIN — Medication 2 UNIT(S): at 10:06

## 2020-09-13 RX ADMIN — Medication 81 MILLIGRAM(S): at 13:05

## 2020-09-13 RX ADMIN — Medication 3 MILLILITER(S): at 00:20

## 2020-09-13 RX ADMIN — Medication 1: at 20:20

## 2020-09-13 RX ADMIN — Medication 325 MILLIGRAM(S): at 22:43

## 2020-09-13 RX ADMIN — POLYETHYLENE GLYCOL 3350 17 GRAM(S): 17 POWDER, FOR SOLUTION ORAL at 13:57

## 2020-09-13 RX ADMIN — SENNA PLUS 2 TABLET(S): 8.6 TABLET ORAL at 22:43

## 2020-09-13 RX ADMIN — Medication 3 MILLILITER(S): at 14:00

## 2020-09-13 RX ADMIN — Medication 5 MILLILITER(S): at 18:10

## 2020-09-13 RX ADMIN — Medication 2 UNIT(S): at 20:20

## 2020-09-13 RX ADMIN — BUDESONIDE AND FORMOTEROL FUMARATE DIHYDRATE 2 PUFF(S): 160; 4.5 AEROSOL RESPIRATORY (INHALATION) at 18:03

## 2020-09-13 RX ADMIN — TIOTROPIUM BROMIDE 1 CAPSULE(S): 18 CAPSULE ORAL; RESPIRATORY (INHALATION) at 14:00

## 2020-09-13 RX ADMIN — LACTULOSE 15 GRAM(S): 10 SOLUTION ORAL at 06:16

## 2020-09-13 RX ADMIN — Medication 2000 UNIT(S): at 13:56

## 2020-09-13 RX ADMIN — Medication 2 UNIT(S): at 13:46

## 2020-09-13 RX ADMIN — Medication 5 MILLILITER(S): at 06:17

## 2020-09-13 RX ADMIN — Medication 1 TABLET(S): at 13:56

## 2020-09-13 RX ADMIN — Medication 325 MILLIGRAM(S): at 14:07

## 2020-09-13 RX ADMIN — Medication 180 MILLIGRAM(S): at 06:17

## 2020-09-13 RX ADMIN — Medication 400 MILLIGRAM(S): at 13:56

## 2020-09-13 RX ADMIN — INSULIN GLARGINE 12 UNIT(S): 100 INJECTION, SOLUTION SUBCUTANEOUS at 22:43

## 2020-09-13 RX ADMIN — Medication 1: at 13:45

## 2020-09-13 RX ADMIN — BUDESONIDE AND FORMOTEROL FUMARATE DIHYDRATE 2 PUFF(S): 160; 4.5 AEROSOL RESPIRATORY (INHALATION) at 06:16

## 2020-09-13 RX ADMIN — Medication 3 MILLILITER(S): at 18:03

## 2020-09-13 NOTE — PROGRESS NOTE ADULT - PROBLEM SELECTOR PLAN 2
- Monitor BMP, Mg, Phos and replete as needed   - Diamox on hold for now.  - Strict ins and outs  - LE edema is improving  - resumed Bumex PO as improved BUN and creatinine

## 2020-09-13 NOTE — PROGRESS NOTE ADULT - ATTENDING COMMENTS
plan of care discussed with floor NP/PA   patient motivated to fight through her advanced COPD  rehab 9/14  appreciate Podiatry/Pulm/Cards recs

## 2020-09-13 NOTE — PROGRESS NOTE ADULT - ASSESSMENT
EVAN 1/7/19: no ALINA thrombus, unable to assess LV sys fx  echo 12/7/18: EF 71%, nl LV sys fx, mild diastolic dysfx     a/p    60 year old female with hx of D CHF, HTN, CAD s/p PCI, Afib/ aflutter, s/p Aflutter Ablation 12/2019, COPD, DM2, Anxiety, , raynaud phenomenon presenting with weakness, SOB , hyperkalemia.        1. Acute on Chronic Diastolic CHF   -volume status continues to improve, reports improved dyspnea  -bumex gtt stopped 2/2 to increase in renal function  -cr elevation related to effective diuresis, would transition to Bumex 2mg PO BID starting today if cr stable on am labs   -close monitoring of electrolytes, renal function, and C02  -diamox prn   -echo with normal LVEF     2. Dyspnea, Resp failure  -secondary to chronic lung disease, COPD, volume overload  -pulm f/u   -covid 19 negative  -ecg with known RBBB, new twi noted, hyperkalemia also noted on admission  -echo with normal LVEF, mild diastolic dysfunction   -s/p PO Prednisone, on bumex gtt   -on Duoneb, Symbicort, Spiriva and Theophylline.    3. CAD s/p PCI   -stable, no cp  -c/w ASA, statin  -echo noted above     4. Afib/aflutter s/p  Aflutter Ablation 12/2019  -in NSR, cw cardizem  mg daily  -CHADsVac=2, c/w eliquis       dvt ppx

## 2020-09-13 NOTE — PROGRESS NOTE ADULT - ASSESSMENT
60F w COPD on 3LNC (?pending transplant list at [facility]), former smoker, CAD s/p stent (2016), afib on eliquis, uncontrolled DM2, raynaud phenomenon and recent hospitalization at [facility] for altered mental status and [place] aw COPD exacerbation, LE swelling, weakness.     Prolonged hospitalization.

## 2020-09-13 NOTE — PROGRESS NOTE ADULT - PROBLEM SELECTOR PLAN 1
- Dyspnea multifactorial in the setting of underlying lung disease, cardiovascular   dysfunction, obesity, and deconditioning.   - Was given prolonged course of Prednisone, now tapered off. Overnight and prn   Bipap. Pt comfortable off Bipap and able to speak in full sentences.   - cont azithro, duonebs, spiriva, symbicort, singulair  - Continue supplemental O2 with goal O2 sat > 88% - she does not need to be at   100%.  -as per discussion with Dr. Mathew, pt was getting evaluated at [facility] by Dr. Cervantes for lung transplant. However she is NOT listed currently to receive a transplant.  f/u with pulm regarding BiPAP

## 2020-09-13 NOTE — PROGRESS NOTE ADULT - PROBLEM SELECTOR PLAN 5
- erythema and pain now resolved, dressing changed today, cefazolin for possible cellulitis, day 10/7 today 9/7  - cefazolin d/c'ed today 8/7  - podiatry re-evaluated 9/12  - Continue with daily mupirocin with adaptic and dsd to left forefoot daily, ACE   compression b/l if pt tolerates

## 2020-09-13 NOTE — PROGRESS NOTE ADULT - SUBJECTIVE AND OBJECTIVE BOX
Patient is a 60y old  Female who presents with a chief complaint of 60F p/w generalized weakness and difficulty walking (13 Sep 2020 11:08)      INTERVAL History of Present Illness/OVERNIGHT EVENTS: stable clinical status  Bumex resumed as per cardiology given improved creatinine    MEDICATIONS  (STANDING):  acetaminophen  IVPB .. 1000 milliGRAM(s) IV Intermittent once  albuterol/ipratropium for Nebulization 3 milliLiter(s) Nebulizer every 6 hours  apixaban 5 milliGRAM(s) Oral every 12 hours  ascorbic acid 500 milliGRAM(s) Oral daily  aspirin enteric coated 81 milliGRAM(s) Oral daily  atorvastatin 80 milliGRAM(s) Oral at bedtime  azithromycin   Tablet 250 milliGRAM(s) Oral <User Schedule>  Biotene Dry Mouth Oral Rinse 5 milliLiter(s) Swish and Spit two times a day  bisacodyl Suppository 10 milliGRAM(s) Rectal daily  budesonide 160 MICROgram(s)/formoterol 4.5 MICROgram(s) Inhaler 2 Puff(s) Inhalation two times a day  buMETAnide 2 milliGRAM(s) Oral every 12 hours  chlorhexidine 2% Cloths 1 Application(s) Topical daily  cholecalciferol 2000 Unit(s) Oral daily  dextrose 5%. 1000 milliLiter(s) (50 mL/Hr) IV Continuous <Continuous>  dextrose 50% Injectable 25 Gram(s) IV Push once  diltiazem    milliGRAM(s) Oral daily  ferrous    sulfate 325 milliGRAM(s) Oral two times a day  insulin glargine Injectable (LANTUS) 12 Unit(s) SubCutaneous at bedtime  insulin lispro (HumaLOG) corrective regimen sliding scale   SubCutaneous three times a day before meals  insulin lispro (HumaLOG) corrective regimen sliding scale   SubCutaneous at bedtime  insulin lispro Injectable (HumaLOG) 2 Unit(s) SubCutaneous three times a day with meals  lactulose Syrup 15 Gram(s) Oral two times a day  montelukast 10 milliGRAM(s) Oral daily  multivitamin 1 Tablet(s) Oral daily  mupirocin 2% Ointment 1 Application(s) Topical <User Schedule>  pantoprazole    Tablet 40 milliGRAM(s) Oral before breakfast  polyethylene glycol 3350 17 Gram(s) Oral daily  senna 2 Tablet(s) Oral at bedtime  theophylline ER (24 Hour) 400 milliGRAM(s) Oral daily  tiotropium 18 MICROgram(s) Capsule 1 Capsule(s) Inhalation daily    MEDICATIONS  (PRN):  glucagon  Injectable 1 milliGRAM(s) IntraMuscular once PRN Glucose LESS THAN 70 milligrams/deciliter  guaiFENesin   Syrup  (Sugar-Free) 100 milliGRAM(s) Oral every 6 hours PRN Cough  magnesium hydroxide Suspension 30 milliLiter(s) Oral daily PRN Constipation  oxyCODONE    IR 5 milliGRAM(s) Oral every 6 hours PRN Severe Pain (7 - 10)      Allergies    No Known Allergies    Intolerances    albuterol (Unknown)      REVIEW OF SYSTEMS:  Negative unless otherwise specified above.    Vital Signs Last 24 Hrs  T(C): 36.8 (13 Sep 2020 09:54), Max: 36.8 (13 Sep 2020 09:54)  T(F): 98.2 (13 Sep 2020 09:54), Max: 98.2 (13 Sep 2020 09:54)  HR: 82 (13 Sep 2020 09:54) (74 - 89)  BP: 153/78 (13 Sep 2020 09:54) (127/78 - 164/94)  BP(mean): --  RR: 18 (13 Sep 2020 09:54) (18 - 18)  SpO2: 99% (13 Sep 2020 09:54) (97% - 100%)        PHYSICAL EXAM:  GENERAL: No apparent distress, appears chronically ill, No SOB  HEAD:  Atraumatic, Normocephalic  EYES: Conjunctiva and sclera clear, no discharge  ENMT: Moist mucous membranes, no nasal discharge  NECK: Supple, no JVD  CHEST/LUNG: Distant breath sounds  HEART: Regular rhythm, no rubs or gallops  ABDOMEN: Soft, Nontender, Nondistended; Bowel sounds present  EXTREMITIES:  No clubbing, cyanosis but mild distal leg edema  bandaged LE  SKIN: No rash or new discoloration  NERVOUS SYSTEM:  Alert & Oriented; Bilateral Lower extremity mobile, sensation to light touch      LABS:                        8.7    12.70 )-----------( 481      ( 13 Sep 2020 11:18 )             30.0     13 Sep 2020 11:18    137    |  89     |  27     ----------------------------<  146    3.8     |  38     |  1.01     Ca    9.9        13 Sep 2020 11:18    TPro  5.9    /  Alb  3.5    /  TBili  0.3    /  DBili  x      /  AST  17     /  ALT  <5     /  AlkPhos  65     13 Sep 2020 11:18        CAPILLARY BLOOD GLUCOSE      POCT Blood Glucose.: 156 mg/dL (13 Sep 2020 13:14)  POCT Blood Glucose.: 144 mg/dL (13 Sep 2020 09:58)  POCT Blood Glucose.: 207 mg/dL (12 Sep 2020 21:33)  POCT Blood Glucose.: 98 mg/dL (12 Sep 2020 17:56)      RADIOLOGY & ADDITIONAL TESTS:    Images reviewed personally    Consultant Notes Reviewed and Care Discussed with relevant Consultants/Other Providers.

## 2020-09-13 NOTE — PROGRESS NOTE ADULT - ASSESSMENT
60F w/ end stage COPD on 3LNC (pending transplant list at [facility]), former smoker, CAD s/p stent (2016), afib on eliquis, uncontrolled DM2, raynaud phenomenon and recent hospitalization at [facility] for altered mental status and [place] presents to [facility] for evaluation of generalized weakness and difficulty walking admit to medicine for further evaluation.    Copd exacerbation: with hypoxic and hypercarbic resp failure  CAD: s/p stent  a Fibrillation  uncontrolled dm   Raynaud's PHENOMENON  wesley  1 cm subpleural nodule vs atelectatic focus    She is on appropriate rx of copd at this time including theophylline  would check its levels :  Cont BD:   off steroids at this time:  No pulm htn pn ECHO:  She is already on full dose AC for a fibrillation:  She is on bipap at 15 /5: abg NOTED:  hco3 is creeping  up: need to be watched may need diamox: ph is 7.40  can she be transferred to [facility]: she did go there before and was supposed to finish few tests prior to that   She is on IV Bumex:   niko brother and team     9/4:  her hco3 is increasing:   give 250 mg of Diamox today :  this may have to be repeated every day for next few days depending upon her hco3:  she is being agressively diuresed:  and her leg edema is much better:  for now to cont curretn rx:  Her theophylline levels are fine:  NIKO PMD       9/8:  clinically she is doing better:  her hco3 has slowly started creeping up again:  do ABG in AM: will repeat diamox tomorrow depending upon his hco3 as well as ph   CONT DIURESIS  she kerr feel better clinically   her leg edema is better then before     9/9:  ac on chr hypercarbic resp failure:  HCO3 is high but NO DIAMOX:  Cont diureses;  she needs to use more of bipap in daytime as wellas full ti me at night ti me:  Likely more hypercarbic secondary to not using bipap yesterday n the aytime: encouraged her to use bipap more in the daytime today too:  overallprognosis is poor:  niko NP     9/10:  doing same:  her ABG today is better:  She has ac on chr hypoercapnic resp fialure secondary to COPD:   cONT DIURESIS:  NO DIAMOX:   DVT propahyxlis:  Cont oxygen to mantain o2 sao2 above 885:  NIKO PMD    9/11:  doing same:  gets SOB off bipap :  ramila would need bipap 24 hours a day :   last ABg with increase in PH:   Cont diuresis per primary team   HCO3 mildly increased but last ph was still acidotic: no diamox for now:  rpt ABG jeremy the weekend:   NIKO pt       9/12:  She is doing ok : no SOB : no wheezing:  Cont bipap and BD :  dvt propahyxlis  hco3 today sis 40 :  cont to monitor: for dc on monday to rehab:  NIKO pt     9/13: pt doing same:  always feel SOB whenoff bipap:  no hco3 today :  cont bipap and BD:  no steroids for now:  Fro dc tomorrow to acute rehab:  Niko pt

## 2020-09-13 NOTE — PROGRESS NOTE ADULT - SUBJECTIVE AND OBJECTIVE BOX
Patient is a 60y old  Female who presents with a chief complaint of 60F p/w generalized weakness and difficulty walking (13 Sep 2020 07:59)      Any change in ROS: Doing same:  using bipap     MEDICATIONS  (STANDING):  acetaminophen  IVPB .. 1000 milliGRAM(s) IV Intermittent once  albuterol/ipratropium for Nebulization 3 milliLiter(s) Nebulizer every 6 hours  apixaban 5 milliGRAM(s) Oral every 12 hours  ascorbic acid 500 milliGRAM(s) Oral daily  aspirin enteric coated 81 milliGRAM(s) Oral daily  atorvastatin 80 milliGRAM(s) Oral at bedtime  azithromycin   Tablet 250 milliGRAM(s) Oral <User Schedule>  Biotene Dry Mouth Oral Rinse 5 milliLiter(s) Swish and Spit two times a day  bisacodyl Suppository 10 milliGRAM(s) Rectal daily  budesonide 160 MICROgram(s)/formoterol 4.5 MICROgram(s) Inhaler 2 Puff(s) Inhalation two times a day  chlorhexidine 2% Cloths 1 Application(s) Topical daily  cholecalciferol 2000 Unit(s) Oral daily  dextrose 5%. 1000 milliLiter(s) (50 mL/Hr) IV Continuous <Continuous>  dextrose 50% Injectable 25 Gram(s) IV Push once  diltiazem    milliGRAM(s) Oral daily  ferrous    sulfate 325 milliGRAM(s) Oral two times a day  insulin glargine Injectable (LANTUS) 12 Unit(s) SubCutaneous at bedtime  insulin lispro (HumaLOG) corrective regimen sliding scale   SubCutaneous three times a day before meals  insulin lispro (HumaLOG) corrective regimen sliding scale   SubCutaneous at bedtime  insulin lispro Injectable (HumaLOG) 2 Unit(s) SubCutaneous three times a day with meals  lactulose Syrup 15 Gram(s) Oral two times a day  montelukast 10 milliGRAM(s) Oral daily  multivitamin 1 Tablet(s) Oral daily  mupirocin 2% Ointment 1 Application(s) Topical <User Schedule>  pantoprazole    Tablet 40 milliGRAM(s) Oral before breakfast  polyethylene glycol 3350 17 Gram(s) Oral daily  senna 2 Tablet(s) Oral at bedtime  theophylline ER (24 Hour) 400 milliGRAM(s) Oral daily  tiotropium 18 MICROgram(s) Capsule 1 Capsule(s) Inhalation daily    MEDICATIONS  (PRN):  glucagon  Injectable 1 milliGRAM(s) IntraMuscular once PRN Glucose LESS THAN 70 milligrams/deciliter  guaiFENesin   Syrup  (Sugar-Free) 100 milliGRAM(s) Oral every 6 hours PRN Cough  magnesium hydroxide Suspension 30 milliLiter(s) Oral daily PRN Constipation  oxyCODONE    IR 5 milliGRAM(s) Oral every 6 hours PRN Severe Pain (7 - 10)    Vital Signs Last 24 Hrs  T(C): 36.8 (13 Sep 2020 09:54), Max: 36.8 (12 Sep 2020 12:34)  T(F): 98.2 (13 Sep 2020 09:54), Max: 98.3 (12 Sep 2020 12:34)  HR: 82 (13 Sep 2020 09:54) (74 - 89)  BP: 153/78 (13 Sep 2020 09:54) (127/78 - 164/94)  BP(mean): --  RR: 18 (13 Sep 2020 09:54) (18 - 18)  SpO2: 99% (13 Sep 2020 09:54) (97% - 100%)    I&O's Summary    12 Sep 2020 07:01  -  13 Sep 2020 07:00  --------------------------------------------------------  IN: 800 mL / OUT: 850 mL / NET: -50 mL          Physical Exam:   GENERAL: NAD, well-groomed, well-developed  HEENT: MITCH/   Atraumatic, Normocephalic  ENMT: No tonsillar erythema, exudates, or enlargement; Moist mucous membranes, Good dentition, No lesions  NECK: Supple, No JVD, Normal thyroid  CHEST/LUNG: poor air entry bilaterally no wheezing:  CVS: Regular rate and rhythm; No murmurs, rubs, or gallops  GI: : Soft, Nontender, Nondistended; Bowel sounds present  NERVOUS SYSTEM:  Alert & Oriented X3  EXTREMITIES:  2+ Peripheral Pulses, No clubbing, cyanosis, or edema  LYMPH: No lymphadenopathy noted  SKIN: No rashes or lesions  ENDOCRINOLOGY: No Thyromegaly  PSYCH: Appropriate    Labs:                              9.8    10.79 )-----------( 664      ( 12 Sep 2020 09:34 )             33.2                         9.3    9.59  )-----------( 684      ( 11 Sep 2020 09:29 )             32.7                         9.0    9.63  )-----------( 595      ( 10 Sep 2020 08:49 )             30.9     09-12    138  |  86<L>  |  31<H>  ----------------------------<  140<H>  4.1   |  40<H>  |  1.43<H>  09-11    139  |  86<L>  |  28<H>  ----------------------------<  147<H>  3.6   |  44<H>  |  1.18  09-10    140  |  87<L>  |  25<H>  ----------------------------<  161<H>  3.8   |  43<H>  |  1.07    Ca    10.5      12 Sep 2020 09:34      CAPILLARY BLOOD GLUCOSE      POCT Blood Glucose.: 144 mg/dL (13 Sep 2020 09:58)  POCT Blood Glucose.: 207 mg/dL (12 Sep 2020 21:33)  POCT Blood Glucose.: 98 mg/dL (12 Sep 2020 17:56)  POCT Blood Glucose.: 164 mg/dL (12 Sep 2020 13:25)        r< from: Xray Abdomen 1 View PORTABLE -Urgent (Xray Abdomen 1 View PORTABLE -Urgent .) (08.08.20 @ 23:41) >    EXAM:  XR ABDOMEN PORTABLE URGENT 1V                            PROCEDURE DATE:  08/08/2020            INTERPRETATION:  Indication: No bowel movement or flatulence. Rule out small bowel obstruction.    Technique: 2 portable views of the abdomen were obtained.    Findings: The bowel gas pattern is nonobstructive. No pathologic calcifications are seen.    Impression: Nonobstructive bowel gas pattern.        r< from: CT Chest No Cont (12.11.19 @ 18:06) >  MEDIASTINUM AND LILIANA: Small mediastinal lymph nodes.    VESSELS: Coronary atherosclerosis.    HEART: Heart size is normal. No pericardial effusion.    CHEST WALL AND LOWER NECK: Within normal limits.    VISUALIZED UPPER ABDOMEN: Within normal limits.    BONES: Degenerative changes.    IMPRESSION:     No infectious consolidation.    1 cm right lower lobe subpleural nodule or atelectatic focus. Follow-up   CT chest in 3 months could be considered.                PAT VASQUEZ M.D., RADIOLOGY RESIDENT  This document has been electronically signed.  JOSH JOLLEY M.D., ATTENDING RADIOLOGIST  This document has been electronically signed. Dec 12 2019 11:46AM        < end of copied text >            MONSTER LAO M.D., ATTENDING RADIOLOGIST  This document has been electronically signed. Aug  9 2020 11:22AM        < end of copied text >            RECENT CULTURES:        RESPIRATORY CULTURES:          Studies  Chest X-RAY  CT SCAN Chest   Venous Dopplers: LE:   CT Abdomen  Others

## 2020-09-13 NOTE — PROGRESS NOTE ADULT - SUBJECTIVE AND OBJECTIVE BOX
CARDIOLOGY FOLLOW UP - Dr. Brunson    CC no acute complaints       PHYSICAL EXAM:  T(C): 36.7 (09-13-20 @ 06:50), Max: 36.8 (09-12-20 @ 12:34)  HR: 74 (09-13-20 @ 06:50) (74 - 89)  BP: 138/74 (09-13-20 @ 06:50) (127/78 - 164/94)  RR: 18 (09-13-20 @ 06:50) (18 - 18)  SpO2: 100% (09-13-20 @ 06:50) (97% - 100%)  Wt(kg): --  I&O's Summary    12 Sep 2020 07:01  -  13 Sep 2020 07:00  --------------------------------------------------------  IN: 800 mL / OUT: 850 mL / NET: -50 mL        Appearance: Normal	  Cardiovascular: Normal S1 S2,RRR, No JVD, No murmurs  Respiratory: diminished   Gastrointestinal:  Soft, Non-tender, + BS	  Extremities: Normal range of motion, No clubbing, b/l Foot edema, b/l dsg intacts       MEDICATIONS  (STANDING):  acetaminophen  IVPB .. 1000 milliGRAM(s) IV Intermittent once  albuterol/ipratropium for Nebulization 3 milliLiter(s) Nebulizer every 6 hours  apixaban 5 milliGRAM(s) Oral every 12 hours  ascorbic acid 500 milliGRAM(s) Oral daily  aspirin enteric coated 81 milliGRAM(s) Oral daily  atorvastatin 80 milliGRAM(s) Oral at bedtime  azithromycin   Tablet 250 milliGRAM(s) Oral <User Schedule>  Biotene Dry Mouth Oral Rinse 5 milliLiter(s) Swish and Spit two times a day  bisacodyl Suppository 10 milliGRAM(s) Rectal daily  budesonide 160 MICROgram(s)/formoterol 4.5 MICROgram(s) Inhaler 2 Puff(s) Inhalation two times a day  chlorhexidine 2% Cloths 1 Application(s) Topical daily  cholecalciferol 2000 Unit(s) Oral daily  dextrose 5%. 1000 milliLiter(s) (50 mL/Hr) IV Continuous <Continuous>  dextrose 50% Injectable 25 Gram(s) IV Push once  diltiazem    milliGRAM(s) Oral daily  ferrous    sulfate 325 milliGRAM(s) Oral two times a day  insulin glargine Injectable (LANTUS) 12 Unit(s) SubCutaneous at bedtime  insulin lispro (HumaLOG) corrective regimen sliding scale   SubCutaneous three times a day before meals  insulin lispro (HumaLOG) corrective regimen sliding scale   SubCutaneous at bedtime  insulin lispro Injectable (HumaLOG) 2 Unit(s) SubCutaneous three times a day with meals  lactulose Syrup 15 Gram(s) Oral two times a day  montelukast 10 milliGRAM(s) Oral daily  multivitamin 1 Tablet(s) Oral daily  mupirocin 2% Ointment 1 Application(s) Topical <User Schedule>  pantoprazole    Tablet 40 milliGRAM(s) Oral before breakfast  polyethylene glycol 3350 17 Gram(s) Oral daily  senna 2 Tablet(s) Oral at bedtime  theophylline ER (24 Hour) 400 milliGRAM(s) Oral daily  tiotropium 18 MICROgram(s) Capsule 1 Capsule(s) Inhalation daily      TELEMETRY: 	    ECG:  	  RADIOLOGY:   DIAGNOSTIC TESTING:  [ ] Echocardiogram:  [ ]  Catheterization:  [ ] Stress Test:    OTHER: 	    LABS:	 	                                9.8    10.79 )-----------( 664      ( 12 Sep 2020 09:34 )             33.2     09-12    138  |  86<L>  |  31<H>  ----------------------------<  140<H>  4.1   |  40<H>  |  1.43<H>    Ca    10.5      12 Sep 2020 09:34  Mg     2.1     09-11

## 2020-09-14 ENCOUNTER — TRANSCRIPTION ENCOUNTER (OUTPATIENT)
Age: 60
End: 2020-09-14

## 2020-09-14 VITALS
DIASTOLIC BLOOD PRESSURE: 80 MMHG | OXYGEN SATURATION: 99 % | SYSTOLIC BLOOD PRESSURE: 162 MMHG | RESPIRATION RATE: 18 BRPM | TEMPERATURE: 99 F | HEART RATE: 90 BPM

## 2020-09-14 LAB
ANION GAP SERPL CALC-SCNC: 12 MMOL/L — SIGNIFICANT CHANGE UP (ref 5–17)
BUN SERPL-MCNC: 29 MG/DL — HIGH (ref 7–23)
CALCIUM SERPL-MCNC: 10.1 MG/DL — SIGNIFICANT CHANGE UP (ref 8.4–10.5)
CHLORIDE SERPL-SCNC: 89 MMOL/L — LOW (ref 96–108)
CO2 SERPL-SCNC: 37 MMOL/L — HIGH (ref 22–31)
CREAT SERPL-MCNC: 1.06 MG/DL — SIGNIFICANT CHANGE UP (ref 0.5–1.3)
GLUCOSE BLDC GLUCOMTR-MCNC: 170 MG/DL — HIGH (ref 70–99)
GLUCOSE BLDC GLUCOMTR-MCNC: 173 MG/DL — HIGH (ref 70–99)
GLUCOSE SERPL-MCNC: 165 MG/DL — HIGH (ref 70–99)
HCT VFR BLD CALC: 30.3 % — LOW (ref 34.5–45)
HGB BLD-MCNC: 8.7 G/DL — LOW (ref 11.5–15.5)
MCHC RBC-ENTMCNC: 24.4 PG — LOW (ref 27–34)
MCHC RBC-ENTMCNC: 28.7 GM/DL — LOW (ref 32–36)
MCV RBC AUTO: 84.9 FL — SIGNIFICANT CHANGE UP (ref 80–100)
NRBC # BLD: 0 /100 WBCS — SIGNIFICANT CHANGE UP (ref 0–0)
PLATELET # BLD AUTO: 455 K/UL — HIGH (ref 150–400)
POTASSIUM SERPL-MCNC: 3.5 MMOL/L — SIGNIFICANT CHANGE UP (ref 3.5–5.3)
POTASSIUM SERPL-SCNC: 3.5 MMOL/L — SIGNIFICANT CHANGE UP (ref 3.5–5.3)
RBC # BLD: 3.57 M/UL — LOW (ref 3.8–5.2)
RBC # FLD: 18.2 % — HIGH (ref 10.3–14.5)
SODIUM SERPL-SCNC: 138 MMOL/L — SIGNIFICANT CHANGE UP (ref 135–145)
WBC # BLD: 10.7 K/UL — HIGH (ref 3.8–10.5)
WBC # FLD AUTO: 10.7 K/UL — HIGH (ref 3.8–10.5)

## 2020-09-14 PROCEDURE — 81001 URINALYSIS AUTO W/SCOPE: CPT

## 2020-09-14 PROCEDURE — P9011: CPT

## 2020-09-14 PROCEDURE — 97161 PT EVAL LOW COMPLEX 20 MIN: CPT

## 2020-09-14 PROCEDURE — 85045 AUTOMATED RETICULOCYTE COUNT: CPT

## 2020-09-14 PROCEDURE — 85014 HEMATOCRIT: CPT

## 2020-09-14 PROCEDURE — 85610 PROTHROMBIN TIME: CPT

## 2020-09-14 PROCEDURE — 80053 COMPREHEN METABOLIC PANEL: CPT

## 2020-09-14 PROCEDURE — 82607 VITAMIN B-12: CPT

## 2020-09-14 PROCEDURE — 86038 ANTINUCLEAR ANTIBODIES: CPT

## 2020-09-14 PROCEDURE — 83550 IRON BINDING TEST: CPT

## 2020-09-14 PROCEDURE — 84100 ASSAY OF PHOSPHORUS: CPT

## 2020-09-14 PROCEDURE — 83605 ASSAY OF LACTIC ACID: CPT

## 2020-09-14 PROCEDURE — 94640 AIRWAY INHALATION TREATMENT: CPT

## 2020-09-14 PROCEDURE — 85730 THROMBOPLASTIN TIME PARTIAL: CPT

## 2020-09-14 PROCEDURE — 86850 RBC ANTIBODY SCREEN: CPT

## 2020-09-14 PROCEDURE — 87086 URINE CULTURE/COLONY COUNT: CPT

## 2020-09-14 PROCEDURE — 36430 TRANSFUSION BLD/BLD COMPNT: CPT

## 2020-09-14 PROCEDURE — 97166 OT EVAL MOD COMPLEX 45 MIN: CPT

## 2020-09-14 PROCEDURE — 99285 EMERGENCY DEPT VISIT HI MDM: CPT

## 2020-09-14 PROCEDURE — 87070 CULTURE OTHR SPECIMN AEROBIC: CPT

## 2020-09-14 PROCEDURE — 86923 COMPATIBILITY TEST ELECTRIC: CPT

## 2020-09-14 PROCEDURE — 82435 ASSAY OF BLOOD CHLORIDE: CPT

## 2020-09-14 PROCEDURE — 99239 HOSP IP/OBS DSCHRG MGMT >30: CPT

## 2020-09-14 PROCEDURE — 82962 GLUCOSE BLOOD TEST: CPT

## 2020-09-14 PROCEDURE — 93306 TTE W/DOPPLER COMPLETE: CPT

## 2020-09-14 PROCEDURE — 83615 LACTATE (LD) (LDH) ENZYME: CPT

## 2020-09-14 PROCEDURE — 97110 THERAPEUTIC EXERCISES: CPT

## 2020-09-14 PROCEDURE — 83921 ORGANIC ACID SINGLE QUANT: CPT

## 2020-09-14 PROCEDURE — 86226 DNA ANTIBODY SINGLE STRAND: CPT

## 2020-09-14 PROCEDURE — 87075 CULTR BACTERIA EXCEPT BLOOD: CPT

## 2020-09-14 PROCEDURE — 86901 BLOOD TYPING SEROLOGIC RH(D): CPT

## 2020-09-14 PROCEDURE — 97530 THERAPEUTIC ACTIVITIES: CPT

## 2020-09-14 PROCEDURE — U0003: CPT

## 2020-09-14 PROCEDURE — 84132 ASSAY OF SERUM POTASSIUM: CPT

## 2020-09-14 PROCEDURE — 36600 WITHDRAWAL OF ARTERIAL BLOOD: CPT

## 2020-09-14 PROCEDURE — 85027 COMPLETE CBC AUTOMATED: CPT

## 2020-09-14 PROCEDURE — 87581 M.PNEUMON DNA AMP PROBE: CPT

## 2020-09-14 PROCEDURE — 97535 SELF CARE MNGMENT TRAINING: CPT

## 2020-09-14 PROCEDURE — 83540 ASSAY OF IRON: CPT

## 2020-09-14 PROCEDURE — 80198 ASSAY OF THEOPHYLLINE: CPT

## 2020-09-14 PROCEDURE — 86255 FLUORESCENT ANTIBODY SCREEN: CPT

## 2020-09-14 PROCEDURE — 94660 CPAP INITIATION&MGMT: CPT

## 2020-09-14 PROCEDURE — 82803 BLOOD GASES ANY COMBINATION: CPT

## 2020-09-14 PROCEDURE — 84295 ASSAY OF SERUM SODIUM: CPT

## 2020-09-14 PROCEDURE — 82330 ASSAY OF CALCIUM: CPT

## 2020-09-14 PROCEDURE — 80048 BASIC METABOLIC PNL TOTAL CA: CPT

## 2020-09-14 PROCEDURE — 87633 RESP VIRUS 12-25 TARGETS: CPT

## 2020-09-14 PROCEDURE — 87205 SMEAR GRAM STAIN: CPT

## 2020-09-14 PROCEDURE — 82728 ASSAY OF FERRITIN: CPT

## 2020-09-14 PROCEDURE — 84443 ASSAY THYROID STIM HORMONE: CPT

## 2020-09-14 PROCEDURE — 74018 RADEX ABDOMEN 1 VIEW: CPT

## 2020-09-14 PROCEDURE — 82947 ASSAY GLUCOSE BLOOD QUANT: CPT

## 2020-09-14 PROCEDURE — 86431 RHEUMATOID FACTOR QUANT: CPT

## 2020-09-14 PROCEDURE — 71045 X-RAY EXAM CHEST 1 VIEW: CPT

## 2020-09-14 PROCEDURE — 86900 BLOOD TYPING SEROLOGIC ABO: CPT

## 2020-09-14 PROCEDURE — 83036 HEMOGLOBIN GLYCOSYLATED A1C: CPT

## 2020-09-14 PROCEDURE — 90686 IIV4 VACC NO PRSV 0.5 ML IM: CPT

## 2020-09-14 PROCEDURE — 85018 HEMOGLOBIN: CPT

## 2020-09-14 PROCEDURE — 87798 DETECT AGENT NOS DNA AMP: CPT

## 2020-09-14 PROCEDURE — 83735 ASSAY OF MAGNESIUM: CPT

## 2020-09-14 PROCEDURE — 93005 ELECTROCARDIOGRAM TRACING: CPT

## 2020-09-14 PROCEDURE — 97116 GAIT TRAINING THERAPY: CPT

## 2020-09-14 PROCEDURE — 87486 CHLMYD PNEUM DNA AMP PROBE: CPT

## 2020-09-14 RX ORDER — INFLUENZA VIRUS VACCINE 15; 15; 15; 15 UG/.5ML; UG/.5ML; UG/.5ML; UG/.5ML
0.5 SUSPENSION INTRAMUSCULAR ONCE
Refills: 0 | Status: COMPLETED | OUTPATIENT
Start: 2020-09-14 | End: 2020-09-14

## 2020-09-14 RX ORDER — NITROGLYCERIN 6.5 MG
1 CAPSULE, EXTENDED RELEASE ORAL
Qty: 0 | Refills: 0 | DISCHARGE

## 2020-09-14 RX ORDER — UBIDECARENONE 100 MG
200 CAPSULE ORAL
Qty: 0 | Refills: 0 | DISCHARGE

## 2020-09-14 RX ORDER — METFORMIN HYDROCHLORIDE 850 MG/1
1 TABLET ORAL
Qty: 0 | Refills: 0 | DISCHARGE

## 2020-09-14 RX ORDER — LACTULOSE 10 G/15ML
22.5 SOLUTION ORAL
Qty: 0 | Refills: 0 | DISCHARGE
Start: 2020-09-14

## 2020-09-14 RX ORDER — ASCORBIC ACID 60 MG
1 TABLET,CHEWABLE ORAL
Qty: 0 | Refills: 0 | DISCHARGE
Start: 2020-09-14

## 2020-09-14 RX ORDER — SENNA PLUS 8.6 MG/1
2 TABLET ORAL
Qty: 0 | Refills: 0 | DISCHARGE
Start: 2020-09-14

## 2020-09-14 RX ORDER — METFORMIN HYDROCHLORIDE 850 MG/1
1 TABLET ORAL
Qty: 0 | Refills: 0 | DISCHARGE
Start: 2020-09-14

## 2020-09-14 RX ORDER — BUMETANIDE 0.25 MG/ML
1 INJECTION INTRAMUSCULAR; INTRAVENOUS
Qty: 0 | Refills: 0 | DISCHARGE
Start: 2020-09-14

## 2020-09-14 RX ORDER — INSULIN GLARGINE 100 [IU]/ML
12 INJECTION, SOLUTION SUBCUTANEOUS
Qty: 0 | Refills: 0 | DISCHARGE
Start: 2020-09-14

## 2020-09-14 RX ORDER — ROSUVASTATIN CALCIUM 5 MG/1
1 TABLET ORAL
Qty: 0 | Refills: 0 | DISCHARGE

## 2020-09-14 RX ORDER — SAXAGLIPTIN 5 MG/1
1 TABLET, FILM COATED ORAL
Qty: 0 | Refills: 0 | DISCHARGE

## 2020-09-14 RX ORDER — FERROUS SULFATE 325(65) MG
1 TABLET ORAL
Qty: 0 | Refills: 0 | DISCHARGE
Start: 2020-09-14

## 2020-09-14 RX ORDER — MUPIROCIN 20 MG/G
1 OINTMENT TOPICAL
Qty: 0 | Refills: 0 | DISCHARGE
Start: 2020-09-14

## 2020-09-14 RX ORDER — ATORVASTATIN CALCIUM 80 MG/1
1 TABLET, FILM COATED ORAL
Qty: 0 | Refills: 0 | DISCHARGE
Start: 2020-09-14

## 2020-09-14 RX ORDER — OXYCODONE HYDROCHLORIDE 5 MG/1
1 TABLET ORAL
Qty: 0 | Refills: 0 | DISCHARGE
Start: 2020-09-14

## 2020-09-14 RX ORDER — POLYETHYLENE GLYCOL 3350 17 G/17G
17 POWDER, FOR SOLUTION ORAL
Qty: 0 | Refills: 0 | DISCHARGE
Start: 2020-09-14

## 2020-09-14 RX ADMIN — Medication 1: at 12:18

## 2020-09-14 RX ADMIN — Medication 81 MILLIGRAM(S): at 11:44

## 2020-09-14 RX ADMIN — Medication 2000 UNIT(S): at 11:44

## 2020-09-14 RX ADMIN — APIXABAN 5 MILLIGRAM(S): 2.5 TABLET, FILM COATED ORAL at 06:14

## 2020-09-14 RX ADMIN — Medication 1: at 10:13

## 2020-09-14 RX ADMIN — MUPIROCIN 1 APPLICATION(S): 20 OINTMENT TOPICAL at 09:23

## 2020-09-14 RX ADMIN — Medication 180 MILLIGRAM(S): at 06:15

## 2020-09-14 RX ADMIN — BUMETANIDE 2 MILLIGRAM(S): 0.25 INJECTION INTRAMUSCULAR; INTRAVENOUS at 06:14

## 2020-09-14 RX ADMIN — INFLUENZA VIRUS VACCINE 0.5 MILLILITER(S): 15; 15; 15; 15 SUSPENSION INTRAMUSCULAR at 14:28

## 2020-09-14 RX ADMIN — Medication 2 UNIT(S): at 12:17

## 2020-09-14 RX ADMIN — CHLORHEXIDINE GLUCONATE 1 APPLICATION(S): 213 SOLUTION TOPICAL at 12:33

## 2020-09-14 RX ADMIN — Medication 500 MILLIGRAM(S): at 11:44

## 2020-09-14 RX ADMIN — Medication 3 MILLILITER(S): at 11:43

## 2020-09-14 RX ADMIN — MONTELUKAST 10 MILLIGRAM(S): 4 TABLET, CHEWABLE ORAL at 11:45

## 2020-09-14 RX ADMIN — Medication 5 MILLILITER(S): at 06:14

## 2020-09-14 RX ADMIN — Medication 2 UNIT(S): at 10:13

## 2020-09-14 RX ADMIN — Medication 400 MILLIGRAM(S): at 11:46

## 2020-09-14 RX ADMIN — Medication 1 TABLET(S): at 11:45

## 2020-09-14 RX ADMIN — POLYETHYLENE GLYCOL 3350 17 GRAM(S): 17 POWDER, FOR SOLUTION ORAL at 11:46

## 2020-09-14 RX ADMIN — LACTULOSE 15 GRAM(S): 10 SOLUTION ORAL at 06:14

## 2020-09-14 RX ADMIN — TIOTROPIUM BROMIDE 1 CAPSULE(S): 18 CAPSULE ORAL; RESPIRATORY (INHALATION) at 11:46

## 2020-09-14 RX ADMIN — Medication 3 MILLILITER(S): at 06:16

## 2020-09-14 RX ADMIN — BUDESONIDE AND FORMOTEROL FUMARATE DIHYDRATE 2 PUFF(S): 160; 4.5 AEROSOL RESPIRATORY (INHALATION) at 06:14

## 2020-09-14 RX ADMIN — Medication 325 MILLIGRAM(S): at 11:45

## 2020-09-14 RX ADMIN — PANTOPRAZOLE SODIUM 40 MILLIGRAM(S): 20 TABLET, DELAYED RELEASE ORAL at 06:14

## 2020-09-14 NOTE — PROGRESS NOTE ADULT - PROBLEM SELECTOR PROBLEM 2
Acute diastolic (congestive) heart failure
Acute diastolic (congestive) heart failure
Chronic obstructive pulmonary disease, unspecified COPD type
Acute diastolic (congestive) heart failure
Respiratory failure with hypoxia and hypercapnia
Respiratory failure with hypoxia and hypercapnia
Weakness
Acute diastolic (congestive) heart failure
Weakness
Weakness
Chronic obstructive pulmonary disease, unspecified COPD type
Acute diastolic (congestive) heart failure
Chronic obstructive pulmonary disease, unspecified COPD type
Acute diastolic (congestive) heart failure
Chronic obstructive pulmonary disease, unspecified COPD type

## 2020-09-14 NOTE — DISCHARGE NOTE PROVIDER - CARE PROVIDERS DIRECT ADDRESSES
,[email or web address]

## 2020-09-14 NOTE — PROGRESS NOTE ADULT - ASSESSMENT
60F w COPD on 3LNC (?pending transplant list at [facility]), former smoker, CAD s/p stent (2016), afib on eliquis, uncontrolled DM2, raynaud phenomenon and recent hospitalization at [facility] for altered mental status and AURORA aw COPD exacerbation, LE swelling, weakness.     Prolonged hospitalization.

## 2020-09-14 NOTE — PROGRESS NOTE ADULT - ASSESSMENT
EVAN 1/7/19: no ALINA thrombus, unable to assess LV sys fx  echo 12/7/18: EF 71%, nl LV sys fx, mild diastolic dysfx     a/p    60 year old female with hx of D CHF, HTN, CAD s/p PCI, Afib/ aflutter, s/p Aflutter Ablation 12/2019, COPD, DM2, Anxiety, , raynaud phenomenon presenting with weakness, SOB , hyperkalemia.        1. Acute on Chronic Diastolic CHF   -volume status significantly improved   -cr elevation related to effective diuresis, now back to baweline   -c/w bumex 2mg PO BID   -close monitoring of electrolytes, renal function, and C02  -diamox prn   -echo with normal LVEF     2. Dyspnea, Resp failure  -secondary to chronic lung disease, COPD, volume overload  -pulm f/u   -covid 19 negative  -ecg with known RBBB, new twi noted, hyperkalemia also noted on admission  -echo with normal LVEF, mild diastolic dysfunction   -s/p PO Prednisone, on bumex gtt   -on Duoneb, Symbicort, Spiriva and Theophylline.    3. CAD s/p PCI   -stable, no cp  -c/w ASA, statin  -echo noted above     4. Afib/aflutter s/p  Aflutter Ablation 12/2019  -in NSR, cw cardizem  mg daily  -CHADsVac=2, c/w eliquis       dvt ppx

## 2020-09-14 NOTE — DISCHARGE NOTE PROVIDER - NSDCCPCAREPLAN_GEN_ALL_CORE_FT
PRINCIPAL DISCHARGE DIAGNOSIS  Diagnosis: Weakness  Assessment and Plan of Treatment: End Stage COPD   Continue on Bipap as ordered   Follow upw ith PCP   Take meds as prescribed      SECONDARY DISCHARGE DIAGNOSES  Diagnosis: Cellulitis  Assessment and Plan of Treatment: Cellulitis  completed dose of antibiotics   Follow up with Podiatrry

## 2020-09-14 NOTE — PROGRESS NOTE ADULT - COVID-19 NEGATIVE LAB RESULT
COVID-19 ruled out
COVID-19 ruled in despite negative lab finding
COVID-19 ruled out
COVID-19 ruled in despite negative lab finding
COVID-19 ruled out

## 2020-09-14 NOTE — PROGRESS NOTE ADULT - SUBJECTIVE AND OBJECTIVE BOX
CARDIOLOGY FOLLOW UP - Dr. Brunson    CC no acute complaints       PHYSICAL EXAM:  T(C): 36.4 (09-14-20 @ 09:17), Max: 36.7 (09-13-20 @ 23:59)  HR: 71 (09-14-20 @ 10:11) (71 - 92)  BP: 125/98 (09-14-20 @ 09:17) (125/98 - 146/80)  RR: 18 (09-14-20 @ 09:17) (18 - 18)  SpO2: 100% (09-14-20 @ 10:11) (98% - 100%)  Wt(kg): --  I&O's Summary    13 Sep 2020 07:01  -  14 Sep 2020 07:00  --------------------------------------------------------  IN: 580 mL / OUT: 1150 mL / NET: -570 mL        Appearance: Normal	  Cardiovascular: Normal S1 S2,RRR, No JVD, No murmurs  Respiratory: diminished   Gastrointestinal:  Soft, Non-tender, + BS	  Extremities: Normal range of motion, No clubbing, b/l foot edema, + LE dsg c/d/i        MEDICATIONS  (STANDING):  acetaminophen  IVPB .. 1000 milliGRAM(s) IV Intermittent once  albuterol/ipratropium for Nebulization 3 milliLiter(s) Nebulizer every 6 hours  apixaban 5 milliGRAM(s) Oral every 12 hours  ascorbic acid 500 milliGRAM(s) Oral daily  aspirin enteric coated 81 milliGRAM(s) Oral daily  atorvastatin 80 milliGRAM(s) Oral at bedtime  azithromycin   Tablet 250 milliGRAM(s) Oral <User Schedule>  Biotene Dry Mouth Oral Rinse 5 milliLiter(s) Swish and Spit two times a day  bisacodyl Suppository 10 milliGRAM(s) Rectal daily  budesonide 160 MICROgram(s)/formoterol 4.5 MICROgram(s) Inhaler 2 Puff(s) Inhalation two times a day  buMETAnide 2 milliGRAM(s) Oral every 12 hours  chlorhexidine 2% Cloths 1 Application(s) Topical daily  cholecalciferol 2000 Unit(s) Oral daily  dextrose 5%. 1000 milliLiter(s) (50 mL/Hr) IV Continuous <Continuous>  dextrose 50% Injectable 25 Gram(s) IV Push once  diltiazem    milliGRAM(s) Oral daily  ferrous    sulfate 325 milliGRAM(s) Oral two times a day  insulin glargine Injectable (LANTUS) 12 Unit(s) SubCutaneous at bedtime  insulin lispro (HumaLOG) corrective regimen sliding scale   SubCutaneous three times a day before meals  insulin lispro (HumaLOG) corrective regimen sliding scale   SubCutaneous at bedtime  insulin lispro Injectable (HumaLOG) 2 Unit(s) SubCutaneous three times a day with meals  lactulose Syrup 15 Gram(s) Oral two times a day  montelukast 10 milliGRAM(s) Oral daily  multivitamin 1 Tablet(s) Oral daily  mupirocin 2% Ointment 1 Application(s) Topical <User Schedule>  pantoprazole    Tablet 40 milliGRAM(s) Oral before breakfast  polyethylene glycol 3350 17 Gram(s) Oral daily  senna 2 Tablet(s) Oral at bedtime  theophylline ER (24 Hour) 400 milliGRAM(s) Oral daily  tiotropium 18 MICROgram(s) Capsule 1 Capsule(s) Inhalation daily      TELEMETRY: 	    ECG:  	  RADIOLOGY:   DIAGNOSTIC TESTING:  [ ] Echocardiogram:  [ ]  Catheterization:  [ ] Stress Test:    OTHER: 	    LABS:	 	                                8.7    12.70 )-----------( 481      ( 13 Sep 2020 11:18 )             30.0     09-13    137  |  89<L>  |  27<H>  ----------------------------<  146<H>  3.8   |  38<H>  |  1.01    Ca    9.9      13 Sep 2020 11:18    TPro  5.9<L>  /  Alb  3.5  /  TBili  0.3  /  DBili  x   /  AST  17  /  ALT  <5<L>  /  AlkPhos  65  09-13

## 2020-09-14 NOTE — PROGRESS NOTE ADULT - PROVIDER SPECIALTY LIST ADULT
Cardiology
Hospitalist
Infectious Disease
Infectious Disease
Podiatry
Pulmonology
Rehab Medicine
Cardiology
Pulmonology
Cardiology
Pulmonology
Hospitalist

## 2020-09-14 NOTE — PROGRESS NOTE ADULT - ASSESSMENT
60F w/ end stage COPD on 3LNC (pending transplant list at [facility]), former smoker, CAD s/p stent (2016), afib on eliquis, uncontrolled DM2, raynaud phenomenon and recent hospitalization at [facility] for altered mental status and WESLEY presents to [facility] for evaluation of generalized weakness and difficulty walking admit to medicine for further evaluation.    Copd exacerbation: with hypoxic and hypercarbic resp failure  CAD: s/p stent  a Fibrillation  uncontrolled dm   Raynaud's PHENOMENON  wesley  1 cm subpleural nodule vs atelectatic focus    She is on appropriate rx of copd at this time including theophylline  would check its levels :  Cont BD:   off steroids at this time:  No pulm htn pn ECHO:  She is already on full dose AC for a fibrillation:  She is on bipap at 15 /5: abg NOTED:  hco3 is creeping  up: need to be watched may need diamox: ph is 7.40  can she be transferred to [facility]: she did go there before and was supposed to finish few tests prior to that   She is on IV Bumex:   niko brother and team     9/4:  her hco3 is increasing:   give 250 mg of Diamox today :  this may have to be repeated every day for next few days depending upon her hco3:  she is being agressively diuresed:  and her leg edema is much better:  for now to cont curretn rx:  Her theophylline levels are fine:  NIKO PMD       9/8:  clinically she is doing better:  her hco3 has slowly started creeping up again:  do ABG in AM: will repeat diamox tomorrow depending upon his hco3 as well as ph   CONT DIURESIS  she kerr feel better clinically   her leg edema is better then before     9/9:  ac on chr hypercarbic resp failure:  HCO3 is high but NO DIAMOX:  Cont diureses;  she needs to use more of bipap in daytime as wellas full ti me at night ti me:  Likely more hypercarbic secondary to not using bipap yesterday n the aytime: encouraged her to use bipap more in the daytime today too:  overallprognosis is poor:  niko NP     9/10:  doing same:  her ABG today is better:  She has ac on chr hypoercapnic resp fialure secondary to COPD:   cONT DIURESIS:  NO DIAMOX:   DVT propahyxlis:  Cont oxygen to mantain o2 sao2 above 885:   PMD    9/11:  doing same:  gets SOB off bipap :  ramila would need bipap 24 hours a day :   last ABg with increase in PH:   Cont diuresis per primary team   HCO3 mildly increased but last ph was still acidotic: no diamox for now:  rpt ABG jeremy the weekend:   DW pt       9/12:  She is doing ok : no SOB : no wheezing:  Cont bipap and BD :  dvt propahyxlis  hco3 today sis 40 :  cont to monitor: for dc on monday to rehab:  DW pt     9/13: pt doing same:  always feel SOB whenoff bipap:  no hco3 today :  cont bipap and BD:  no steroids for now:  Fro dc tomorrow to acute rehab:  Dw pt     9/14:  pt is doing same:  feels SOb off and on:  on 24 hours of bipap :  Cont BD: the pedal edema is better:  Cont diuresis per cards:  Conto2 to sao2 above 90%

## 2020-09-14 NOTE — PROGRESS NOTE ADULT - SUBJECTIVE AND OBJECTIVE BOX
Patient is a 60y old  Female who presents with a chief complaint of 60F p/w generalized weakness and difficulty walking (14 Sep 2020 11:18)    SUBJECTIVE / OVERNIGHT EVENTS: no acute events overnight, pt now on oral bumex.    MEDICATIONS  (STANDING):  acetaminophen  IVPB .. 1000 milliGRAM(s) IV Intermittent once  albuterol/ipratropium for Nebulization 3 milliLiter(s) Nebulizer every 6 hours  apixaban 5 milliGRAM(s) Oral every 12 hours  ascorbic acid 500 milliGRAM(s) Oral daily  aspirin enteric coated 81 milliGRAM(s) Oral daily  atorvastatin 80 milliGRAM(s) Oral at bedtime  azithromycin   Tablet 250 milliGRAM(s) Oral <User Schedule>  Biotene Dry Mouth Oral Rinse 5 milliLiter(s) Swish and Spit two times a day  bisacodyl Suppository 10 milliGRAM(s) Rectal daily  budesonide 160 MICROgram(s)/formoterol 4.5 MICROgram(s) Inhaler 2 Puff(s) Inhalation two times a day  buMETAnide 2 milliGRAM(s) Oral every 12 hours  chlorhexidine 2% Cloths 1 Application(s) Topical daily  cholecalciferol 2000 Unit(s) Oral daily  dextrose 5%. 1000 milliLiter(s) (50 mL/Hr) IV Continuous <Continuous>  dextrose 50% Injectable 25 Gram(s) IV Push once  diltiazem    milliGRAM(s) Oral daily  ferrous    sulfate 325 milliGRAM(s) Oral two times a day  insulin glargine Injectable (LANTUS) 12 Unit(s) SubCutaneous at bedtime  insulin lispro (HumaLOG) corrective regimen sliding scale   SubCutaneous three times a day before meals  insulin lispro (HumaLOG) corrective regimen sliding scale   SubCutaneous at bedtime  insulin lispro Injectable (HumaLOG) 2 Unit(s) SubCutaneous three times a day with meals  lactulose Syrup 15 Gram(s) Oral two times a day  montelukast 10 milliGRAM(s) Oral daily  multivitamin 1 Tablet(s) Oral daily  mupirocin 2% Ointment 1 Application(s) Topical <User Schedule>  pantoprazole    Tablet 40 milliGRAM(s) Oral before breakfast  polyethylene glycol 3350 17 Gram(s) Oral daily  senna 2 Tablet(s) Oral at bedtime  theophylline ER (24 Hour) 400 milliGRAM(s) Oral daily  tiotropium 18 MICROgram(s) Capsule 1 Capsule(s) Inhalation daily    MEDICATIONS  (PRN):  glucagon  Injectable 1 milliGRAM(s) IntraMuscular once PRN Glucose LESS THAN 70 milligrams/deciliter  guaiFENesin   Syrup  (Sugar-Free) 100 milliGRAM(s) Oral every 6 hours PRN Cough  magnesium hydroxide Suspension 30 milliLiter(s) Oral daily PRN Constipation  oxyCODONE    IR 5 milliGRAM(s) Oral every 6 hours PRN Severe Pain (7 - 10)      Vital Signs Last 24 Hrs  T(C): 36.4 (14 Sep 2020 09:17), Max: 36.7 (13 Sep 2020 23:59)  T(F): 97.5 (14 Sep 2020 09:17), Max: 98.1 (13 Sep 2020 23:59)  HR: 71 (14 Sep 2020 10:11) (71 - 92)  BP: 125/98 (14 Sep 2020 09:17) (125/98 - 146/80)  BP(mean): --  RR: 18 (14 Sep 2020 09:17) (18 - 18)  SpO2: 100% (14 Sep 2020 10:11) (98% - 100%)  CAPILLARY BLOOD GLUCOSE      POCT Blood Glucose.: 173 mg/dL (14 Sep 2020 10:08)  POCT Blood Glucose.: 203 mg/dL (13 Sep 2020 22:05)  POCT Blood Glucose.: 199 mg/dL (13 Sep 2020 20:05)  POCT Blood Glucose.: 156 mg/dL (13 Sep 2020 13:14)    I&O's Summary    13 Sep 2020 07:01  -  14 Sep 2020 07:00  --------------------------------------------------------  IN: 580 mL / OUT: 1150 mL / NET: -570 mL    14 Sep 2020 07:01  -  14 Sep 2020 12:03  --------------------------------------------------------  IN: 400 mL / OUT: 1300 mL / NET: -900 mL        PHYSICAL EXAM:  GENERAL: NAD  EYES:  conjunctiva and sclera clear  CHEST/LUNG: Clear to auscultation bilaterally; No wheeze  HEART: +S1/S2, reg   ABDOMEN: Soft, Nontender, Nondistended  EXTREMITIES:  +1 edema in the LE b/l up to the shins   PSYCH: AAOx3      LABS:                        8.7    10.70 )-----------( 455      ( 14 Sep 2020 11:39 )             30.3     09-13    137  |  89<L>  |  27<H>  ----------------------------<  146<H>  3.8   |  38<H>  |  1.01    Ca    9.9      13 Sep 2020 11:18    TPro  5.9<L>  /  Alb  3.5  /  TBili  0.3  /  DBili  x   /  AST  17  /  ALT  <5<L>  /  AlkPhos  65  09-13      Care Discussed with Consultants/Other Providers: Dr. Leonides Mcnair

## 2020-09-14 NOTE — PROGRESS NOTE ADULT - MINUTES
35
25
35
25
35
25
35
25
30
30
35

## 2020-09-14 NOTE — PHARMACOTHERAPY INTERVENTION NOTE - COMMENTS
Pharmacy intern discussed discharge medication name, dose, frequency, and side effects. Medication information handout provided from Med Essential Fact Sheet (Senexx Carenotes®).

## 2020-09-14 NOTE — DISCHARGE NOTE PROVIDER - CARE PROVIDER_API CALL
Anita Mathew  CRITICAL CARE MEDICINE  [address], [address]  [place] [address]  Phone: [phone]  Fax: [phone]  Follow Up Time:     David Brunson  CARDIOVASCULAR DISEASE  [address], [address]  [place] [address]  Phone: [phone]  Fax: [phone]  Follow Up Time:

## 2020-09-14 NOTE — DISCHARGE NOTE PROVIDER - HOSPITAL COURSE
60F w/ end stage COPD on 3LNC (pending transplant list at [facility]), former smoker, CAD s/p stent (2016), afib on eliquis, uncontrolled DM2, raynaud phenomenon and recent hospitalization at [facility] for altered mental status and AURORA presents to [facility] for evaluation of generalized weakness and difficulty walking admit to medicine for further evaluation.  60F w COPD on 3LNC (?pending transplant list at [facility]), former smoker, CAD s/p stent (2016), afib on eliquis, uncontrolled DM2, raynaud phenomenon and recent hospitalization at [facility] for altered mental status and AURORA aw COPD exacerbation, LE swelling, weakness.   Prolonged hospitalization.  Problem: COPD exacerbation.  Plan: - Dyspnea multifactorial in the setting of underlying lung disease, cardiovascular   dysfunction, obesity, and deconditioning.    Was given prolonged course of Prednisone, now tapered off. Overnight and prn   Bipap. Pt comfortable off Bipap and able to speak in full sentences.    duonebs, spiriva, symbicort, singulair   Continue supplemental O2 with goal O2 sat > 88% - she does not need to be at   100%.  as per discussion with Dr. Mathew, pt was getting evaluated at [facility] by Dr. Cervantes for lung transplant. However she is NOT listed currently to receive a transplant.  BiPAP throughout the day as needed, and also at night time.   on 24 hours of bipap :  Cont BD: the pedal edema is better:  Cont diuresis per cards:  DCP with med rec discussed with Dr Dallas PT  is cleared for Rehab with bipap   Follow up with Dr Mathew

## 2020-09-14 NOTE — PROGRESS NOTE ADULT - PROBLEM SELECTOR PROBLEM 1
COPD exacerbation
Weakness
Acute on chronic respiratory failure
COPD exacerbation
Acute on chronic respiratory failure
Acute on chronic respiratory failure
COPD exacerbation
Weakness
COPD exacerbation
Acute on chronic respiratory failure with hypoxia and hypercapnia
COPD exacerbation
Acute on chronic respiratory failure with hypoxia and hypercapnia

## 2020-09-14 NOTE — PROGRESS NOTE ADULT - REASON FOR ADMISSION
60F p/w generalized weakness and difficulty walking

## 2020-09-14 NOTE — PROGRESS NOTE ADULT - PROBLEM SELECTOR PLAN 1
- Dyspnea multifactorial in the setting of underlying lung disease, cardiovascular   dysfunction, obesity, and deconditioning.   - Was given prolonged course of Prednisone, now tapered off. Overnight and prn   Bipap. Pt comfortable off Bipap and able to speak in full sentences.   - cont azithro, duonebs, spiriva, symbicort, singulair  - Continue supplemental O2 with goal O2 sat > 88% - she does not need to be at   100%.  -as per discussion with Dr. Mathew, pt was getting evaluated at [facility] by Dr. Cervantes for lung transplant. However she is NOT listed currently to receive a transplant.  -BiPAP throughout the day as needed, and also at night time

## 2020-09-14 NOTE — DISCHARGE NOTE PROVIDER - NSDCMRMEDTOKEN_GEN_ALL_CORE_FT
apixaban 5 mg oral tablet: 1 tab(s) orally every 12 hours  ascorbic acid 500 mg oral tablet: 1 tab(s) orally once a day  aspirin 81 mg oral delayed release tablet: 1 tab(s) orally once a day  atorvastatin 80 mg oral tablet: 1 tab(s) orally once a day (at bedtime)  bisacodyl 10 mg rectal suppository: 1 suppository(ies) rectal once a day  bumetanide 2 mg oral tablet: 1 tab(s) orally every 12 hours  dilTIAZem 180 mg/24 hours oral capsule, extended release: 1 cap(s) orally once a day  ferrous sulfate 325 mg (65 mg elemental iron) oral tablet: 1 tab(s) orally 2 times a day  guaiFENesin 100 mg/5 mL oral liquid: 5 milliliter(s) orally every 6 hours, As needed, Cough  insulin glargine: 12 unit(s) subcutaneous once a day (at bedtime)  lactulose 10 g/15 mL oral syrup: 22.5 milliliter(s) orally 2 times a day  metFORMIN 500 mg oral tablet: 1 tab(s) orally 2 times a day  Multiple Vitamins oral tablet: 1 tab(s) orally once a day  mupirocin 2% topical ointment: 1 application topically   oxyCODONE 5 mg oral tablet: 1 tab(s) orally every 6 hours, As needed, Severe Pain (7 - 10)  pantoprazole 40 mg oral delayed release tablet: 1 tab(s) orally once a day (before a meal)  polyethylene glycol 3350 oral powder for reconstitution: 17 gram(s) orally once a day  senna oral tablet: 2 tab(s) orally once a day (at bedtime)  Singulair 10 mg oral tablet: 1 tab(s) orally once a day (in the evening)  Spiriva 18 mcg inhalation capsule: 1 cap(s) inhaled once a day  Symbicort 160 mcg-4.5 mcg/inh inhalation aerosol: 2 puff(s) inhaled 2 times a day  theophylline 400 mg/24 hours oral tablet, extended release: 1 tab(s) orally once a day  Ventolin HFA 90 mcg/inh inhalation aerosol: 2 puff(s) inhaled 2 times a day  Vitamin D3 2000 intl units oral tablet: 1 tab(s) orally once a day

## 2020-09-14 NOTE — PROGRESS NOTE ADULT - SUBJECTIVE AND OBJECTIVE BOX
Patient is a 60y old  Female who presents with a chief complaint of 60F p/w generalized weakness and difficulty walking (14 Sep 2020 10:14)      Any change in ROS: She feels OK: but sob all the time off and on    MEDICATIONS  (STANDING):  acetaminophen  IVPB .. 1000 milliGRAM(s) IV Intermittent once  albuterol/ipratropium for Nebulization 3 milliLiter(s) Nebulizer every 6 hours  apixaban 5 milliGRAM(s) Oral every 12 hours  ascorbic acid 500 milliGRAM(s) Oral daily  aspirin enteric coated 81 milliGRAM(s) Oral daily  atorvastatin 80 milliGRAM(s) Oral at bedtime  azithromycin   Tablet 250 milliGRAM(s) Oral <User Schedule>  Biotene Dry Mouth Oral Rinse 5 milliLiter(s) Swish and Spit two times a day  bisacodyl Suppository 10 milliGRAM(s) Rectal daily  budesonide 160 MICROgram(s)/formoterol 4.5 MICROgram(s) Inhaler 2 Puff(s) Inhalation two times a day  buMETAnide 2 milliGRAM(s) Oral every 12 hours  chlorhexidine 2% Cloths 1 Application(s) Topical daily  cholecalciferol 2000 Unit(s) Oral daily  dextrose 5%. 1000 milliLiter(s) (50 mL/Hr) IV Continuous <Continuous>  dextrose 50% Injectable 25 Gram(s) IV Push once  diltiazem    milliGRAM(s) Oral daily  ferrous    sulfate 325 milliGRAM(s) Oral two times a day  insulin glargine Injectable (LANTUS) 12 Unit(s) SubCutaneous at bedtime  insulin lispro (HumaLOG) corrective regimen sliding scale   SubCutaneous three times a day before meals  insulin lispro (HumaLOG) corrective regimen sliding scale   SubCutaneous at bedtime  insulin lispro Injectable (HumaLOG) 2 Unit(s) SubCutaneous three times a day with meals  lactulose Syrup 15 Gram(s) Oral two times a day  montelukast 10 milliGRAM(s) Oral daily  multivitamin 1 Tablet(s) Oral daily  mupirocin 2% Ointment 1 Application(s) Topical <User Schedule>  pantoprazole    Tablet 40 milliGRAM(s) Oral before breakfast  polyethylene glycol 3350 17 Gram(s) Oral daily  senna 2 Tablet(s) Oral at bedtime  theophylline ER (24 Hour) 400 milliGRAM(s) Oral daily  tiotropium 18 MICROgram(s) Capsule 1 Capsule(s) Inhalation daily    MEDICATIONS  (PRN):  glucagon  Injectable 1 milliGRAM(s) IntraMuscular once PRN Glucose LESS THAN 70 milligrams/deciliter  guaiFENesin   Syrup  (Sugar-Free) 100 milliGRAM(s) Oral every 6 hours PRN Cough  magnesium hydroxide Suspension 30 milliLiter(s) Oral daily PRN Constipation  oxyCODONE    IR 5 milliGRAM(s) Oral every 6 hours PRN Severe Pain (7 - 10)    Vital Signs Last 24 Hrs  T(C): 36.4 (14 Sep 2020 09:17), Max: 36.7 (13 Sep 2020 23:59)  T(F): 97.5 (14 Sep 2020 09:17), Max: 98.1 (13 Sep 2020 23:59)  HR: 71 (14 Sep 2020 10:11) (71 - 92)  BP: 125/98 (14 Sep 2020 09:17) (125/98 - 146/80)  BP(mean): --  RR: 18 (14 Sep 2020 09:17) (18 - 18)  SpO2: 100% (14 Sep 2020 10:11) (98% - 100%)    I&O's Summary    13 Sep 2020 07:01  -  14 Sep 2020 07:00  --------------------------------------------------------  IN: 580 mL / OUT: 1150 mL / NET: -570 mL    14 Sep 2020 07:01  -  14 Sep 2020 11:18  --------------------------------------------------------  IN: 400 mL / OUT: 1300 mL / NET: -900 mL          Physical Exam:   GENERAL: NAD, well-groomed, well-developed  HEENT: MITCH/   Atraumatic, Normocephalic  ENMT: No tonsillar erythema, exudates, or enlargement; Moist mucous membranes, Good dentition, No lesions  NECK: Supple, No JVD, Normal thyroid  CHEST/LUNG: Poor air entry   CVS: Regular rate and rhythm; No murmurs, rubs, or gallops  GI: : Soft, Nontender, Nondistended; Bowel sounds present  NERVOUS SYSTEM:  Alert & Oriented X3  EXTREMITIES:much better  edema  LYMPH: No lymphadenopathy noted  SKIN: No rashes or lesions  ENDOCRINOLOGY: No Thyromegaly  PSYCH: Appropriate    Labs:                              8.7    12.70 )-----------( 481      ( 13 Sep 2020 11:18 )             30.0                         9.8    10.79 )-----------( 664      ( 12 Sep 2020 09:34 )             33.2                         9.3    9.59  )-----------( 684      ( 11 Sep 2020 09:29 )             32.7     09-13    137  |  89<L>  |  27<H>  ----------------------------<  146<H>  3.8   |  38<H>  |  1.01  09-12    138  |  86<L>  |  31<H>  ----------------------------<  140<H>  4.1   |  40<H>  |  1.43<H>  09-11    139  |  86<L>  |  28<H>  ----------------------------<  147<H>  3.6   |  44<H>  |  1.18    Ca    9.9      13 Sep 2020 11:18    TPro  5.9<L>  /  Alb  3.5  /  TBili  0.3  /  DBili  x   /  AST  17  /  ALT  <5<L>  /  AlkPhos  65  09-13    CAPILLARY BLOOD GLUCOSE      POCT Blood Glucose.: 173 mg/dL (14 Sep 2020 10:08)  POCT Blood Glucose.: 203 mg/dL (13 Sep 2020 22:05)  POCT Blood Glucose.: 199 mg/dL (13 Sep 2020 20:05)  POCT Blood Glucose.: 156 mg/dL (13 Sep 2020 13:14)      LIVER FUNCTIONS - ( 13 Sep 2020 11:18 )  Alb: 3.5 g/dL / Pro: 5.9 g/dL / ALK PHOS: 65 U/L / ALT: <5 U/L / AST: 17 U/L / GGT: x             ra< from: Xray Chest 1 View- PORTABLE-Routine (Xray Chest 1 View- PORTABLE-Routine .) (08.01.20 @ 16:44) >  EXAM:  XR CHEST PORTABLE ROUTINE 1V                            PROCEDURE DATE:  08/01/2020            INTERPRETATION:  CLINICAL INFORMATION: Screening, history COPD.    TECHNIQUE: Single portable view of the chest.    COMPARISON: 12/11/2019.      IMPRESSION:    The lungs are clear.  Heart size cannot be accurately assessed in this projection.  No acute bone abnormality.        < from: CT Chest No Cont (12.11.19 @ 18:06) >  wer lobe subpleural nodule (2:96). Centrilobular emphysema.    PLEURA: No pleural effusion.    MEDIASTINUM AND LILIANA: Small mediastinal lymph nodes.    VESSELS: Coronary atherosclerosis.    HEART: Heart size is normal. No pericardial effusion.    CHEST WALL AND LOWER NECK: Within normal limits.    VISUALIZED UPPER ABDOMEN: Within normal limits.    BONES: Degenerative changes.    IMPRESSION:     No infectious consolidation.    1 cm right lower lobe subpleural nodule or atelectatic focus. Follow-up   CT chest in 3 months could be considered.                PAT VASQUEZ M.D., RADIOLOGY RESIDENT  This document has been electronically signed.  JOSH JOLLEY M.D., ATTENDING RADIOLOGIST  This document has been electronically signed. Dec 12 2019 11:46AM    < end of copied text >        GOVIND SIMMONS M.D., RESIDENT RADIOLOGIST  This document has been electronically signed.  CHELSEY SMITH M.D., ATTENDING RADIOLOGIST  This document has been electronically signed. Aug  2 2020  9:05AM        < end of copied text >          RECENT CULTURES:        RESPIRATORY CULTURES:          Studies  Chest X-RAY  CT SCAN Chest   Venous Dopplers: LE:   CT Abdomen  Others

## 2020-09-14 NOTE — DISCHARGE NOTE NURSING/CASE MANAGEMENT/SOCIAL WORK - PATIENT PORTAL LINK FT
You can access the FollowMyHealth Patient Portal offered by [facility] by registering at the following website: [email or web address]. By joining [employer]’s FollowMyHealth portal, you will also be able to view your health information using other applications (apps) compatible with our system.

## 2020-09-14 NOTE — PROGRESS NOTE ADULT - PROBLEM SELECTOR PROBLEM 3
Obstructive sleep apnea
Type 2 diabetes mellitus with hyperglycemia, without long-term current use of insulin
Type 2 diabetes mellitus with hyperglycemia, without long-term current use of insulin
Hyperkalemia
Acute on chronic diastolic heart failure
Acute on chronic diastolic heart failure
Chronic obstructive pulmonary disease, unspecified COPD type
Type 2 diabetes mellitus with hyperglycemia, without long-term current use of insulin
Chronic obstructive pulmonary disease, unspecified COPD type
Type 2 diabetes mellitus with hyperglycemia, without long-term current use of insulin
Hyperkalemia
Type 2 diabetes mellitus with hyperglycemia, without long-term current use of insulin
Obstructive sleep apnea
Type 2 diabetes mellitus with hyperglycemia, without long-term current use of insulin

## 2020-09-24 ENCOUNTER — APPOINTMENT (OUTPATIENT)
Dept: INTERNAL MEDICINE | Facility: CLINIC | Age: 60
End: 2020-09-24
Payer: COMMERCIAL

## 2020-09-24 PROCEDURE — 99442: CPT

## 2020-09-25 PROBLEM — I48.91 UNSPECIFIED ATRIAL FIBRILLATION: Chronic | Status: ACTIVE | Noted: 2020-08-01

## 2020-09-29 RX ORDER — ONDANSETRON 8 MG/1
1 TABLET, FILM COATED ORAL
Qty: 0 | Refills: 0 | DISCHARGE
Start: 2020-09-29

## 2020-10-04 ENCOUNTER — RX RENEWAL (OUTPATIENT)
Age: 60
End: 2020-10-04

## 2020-10-06 ENCOUNTER — INPATIENT (INPATIENT)
Facility: HOSPITAL | Age: 60
LOS: 17 days | Discharge: HOME | DRG: 193 | End: 2020-10-24
Attending: FAMILY MEDICINE | Admitting: STUDENT IN AN ORGANIZED HEALTH CARE EDUCATION/TRAINING PROGRAM
Payer: COMMERCIAL

## 2020-10-06 ENCOUNTER — TRANSCRIPTION ENCOUNTER (OUTPATIENT)
Age: 60
End: 2020-10-06

## 2020-10-06 VITALS
TEMPERATURE: 98 F | WEIGHT: 167.99 LBS | SYSTOLIC BLOOD PRESSURE: 111 MMHG | RESPIRATION RATE: 21 BRPM | DIASTOLIC BLOOD PRESSURE: 78 MMHG | HEART RATE: 130 BPM | HEIGHT: 64 IN | OXYGEN SATURATION: 94 %

## 2020-10-06 DIAGNOSIS — I50.9 HEART FAILURE, UNSPECIFIED: ICD-10-CM

## 2020-10-06 DIAGNOSIS — J44.9 CHRONIC OBSTRUCTIVE PULMONARY DISEASE, UNSPECIFIED: ICD-10-CM

## 2020-10-06 DIAGNOSIS — G89.29 OTHER CHRONIC PAIN: ICD-10-CM

## 2020-10-06 DIAGNOSIS — J96.01 ACUTE RESPIRATORY FAILURE WITH HYPOXIA: ICD-10-CM

## 2020-10-06 DIAGNOSIS — K59.00 CONSTIPATION, UNSPECIFIED: ICD-10-CM

## 2020-10-06 DIAGNOSIS — E11.9 TYPE 2 DIABETES MELLITUS WITHOUT COMPLICATIONS: ICD-10-CM

## 2020-10-06 DIAGNOSIS — I10 ESSENTIAL (PRIMARY) HYPERTENSION: ICD-10-CM

## 2020-10-06 DIAGNOSIS — Z29.9 ENCOUNTER FOR PROPHYLACTIC MEASURES, UNSPECIFIED: ICD-10-CM

## 2020-10-06 DIAGNOSIS — Z90.89 ACQUIRED ABSENCE OF OTHER ORGANS: Chronic | ICD-10-CM

## 2020-10-06 DIAGNOSIS — Z86.79 PERSONAL HISTORY OF OTHER DISEASES OF THE CIRCULATORY SYSTEM: ICD-10-CM

## 2020-10-06 DIAGNOSIS — J44.1 CHRONIC OBSTRUCTIVE PULMONARY DISEASE WITH (ACUTE) EXACERBATION: ICD-10-CM

## 2020-10-06 LAB
ALBUMIN SERPL ELPH-MCNC: 2.4 G/DL — LOW (ref 3.3–5)
ALP SERPL-CCNC: 87 U/L — SIGNIFICANT CHANGE UP (ref 40–120)
ALT FLD-CCNC: 14 U/L — SIGNIFICANT CHANGE UP (ref 12–78)
ANION GAP SERPL CALC-SCNC: 8 MMOL/L — SIGNIFICANT CHANGE UP (ref 5–17)
AST SERPL-CCNC: 32 U/L — SIGNIFICANT CHANGE UP (ref 15–37)
BASE EXCESS BLDV CALC-SCNC: 7.5 MMOL/L — HIGH (ref -2–2)
BILIRUB SERPL-MCNC: 0.5 MG/DL — SIGNIFICANT CHANGE UP (ref 0.2–1.2)
BLOOD GAS COMMENTS, VENOUS: SIGNIFICANT CHANGE UP
BUN SERPL-MCNC: 16 MG/DL — SIGNIFICANT CHANGE UP (ref 7–23)
CALCIUM SERPL-MCNC: 8.9 MG/DL — SIGNIFICANT CHANGE UP (ref 8.5–10.1)
CHLORIDE SERPL-SCNC: 90 MMOL/L — LOW (ref 96–108)
CO2 SERPL-SCNC: 35 MMOL/L — HIGH (ref 22–31)
CREAT SERPL-MCNC: 1 MG/DL — SIGNIFICANT CHANGE UP (ref 0.5–1.3)
GLUCOSE SERPL-MCNC: 132 MG/DL — HIGH (ref 70–99)
HCO3 BLDV-SCNC: 31 MMOL/L — HIGH (ref 21–29)
HCT VFR BLD CALC: 30.5 % — LOW (ref 34.5–45)
HGB BLD-MCNC: 9.9 G/DL — LOW (ref 11.5–15.5)
HOROWITZ INDEX BLDV+IHG-RTO: 21 — SIGNIFICANT CHANGE UP
LACTATE SERPL-SCNC: 0.9 MMOL/L — SIGNIFICANT CHANGE UP (ref 0.7–2)
MCHC RBC-ENTMCNC: 24.8 PG — LOW (ref 27–34)
MCHC RBC-ENTMCNC: 32.5 GM/DL — SIGNIFICANT CHANGE UP (ref 32–36)
MCV RBC AUTO: 76.4 FL — LOW (ref 80–100)
NRBC # BLD: 0 /100 WBCS — SIGNIFICANT CHANGE UP (ref 0–0)
PCO2 BLDV: 58 MMHG — HIGH (ref 35–50)
PH BLDV: 7.37 — SIGNIFICANT CHANGE UP (ref 7.35–7.45)
PLATELET # BLD AUTO: 435 K/UL — HIGH (ref 150–400)
PO2 BLDV: 158 MMHG — HIGH (ref 25–45)
POTASSIUM SERPL-MCNC: 3.8 MMOL/L — SIGNIFICANT CHANGE UP (ref 3.5–5.3)
POTASSIUM SERPL-SCNC: 3.8 MMOL/L — SIGNIFICANT CHANGE UP (ref 3.5–5.3)
PROT SERPL-MCNC: 7.8 G/DL — SIGNIFICANT CHANGE UP (ref 6–8.3)
RBC # BLD: 3.99 M/UL — SIGNIFICANT CHANGE UP (ref 3.8–5.2)
RBC # FLD: 16.3 % — HIGH (ref 10.3–14.5)
SAO2 % BLDV: 97 % — HIGH (ref 67–88)
SARS-COV-2 IGG SERPL QL IA: NEGATIVE — SIGNIFICANT CHANGE UP
SARS-COV-2 IGM SERPL IA-ACNC: 0.11 INDEX — SIGNIFICANT CHANGE UP
SARS-COV-2 RNA SPEC QL NAA+PROBE: SIGNIFICANT CHANGE UP
SODIUM SERPL-SCNC: 133 MMOL/L — LOW (ref 135–145)
THEOPHYLLINE SERPL-MCNC: 10.1 UG/ML — SIGNIFICANT CHANGE UP (ref 10–20)
WBC # BLD: 11.41 K/UL — HIGH (ref 3.8–10.5)
WBC # FLD AUTO: 11.41 K/UL — HIGH (ref 3.8–10.5)

## 2020-10-06 PROCEDURE — 71250 CT THORAX DX C-: CPT | Mod: 26

## 2020-10-06 PROCEDURE — 71045 X-RAY EXAM CHEST 1 VIEW: CPT | Mod: 26

## 2020-10-06 PROCEDURE — 93010 ELECTROCARDIOGRAM REPORT: CPT

## 2020-10-06 PROCEDURE — 99222 1ST HOSP IP/OBS MODERATE 55: CPT

## 2020-10-06 PROCEDURE — 99285 EMERGENCY DEPT VISIT HI MDM: CPT

## 2020-10-06 PROCEDURE — 99223 1ST HOSP IP/OBS HIGH 75: CPT | Mod: GC,AI

## 2020-10-06 PROCEDURE — 12345: CPT | Mod: NC,GC

## 2020-10-06 PROCEDURE — 93306 TTE W/DOPPLER COMPLETE: CPT | Mod: 26

## 2020-10-06 RX ORDER — VANCOMYCIN HCL 1 G
1000 VIAL (EA) INTRAVENOUS ONCE
Refills: 0 | Status: COMPLETED | OUTPATIENT
Start: 2020-10-06 | End: 2020-10-06

## 2020-10-06 RX ORDER — SENNA PLUS 8.6 MG/1
2 TABLET ORAL AT BEDTIME
Refills: 0 | Status: DISCONTINUED | OUTPATIENT
Start: 2020-10-06 | End: 2020-10-24

## 2020-10-06 RX ORDER — DEXTROSE 50 % IN WATER 50 %
15 SYRINGE (ML) INTRAVENOUS ONCE
Refills: 0 | Status: DISCONTINUED | OUTPATIENT
Start: 2020-10-06 | End: 2020-10-24

## 2020-10-06 RX ORDER — FERROUS SULFATE 325(65) MG
325 TABLET ORAL DAILY
Refills: 0 | Status: DISCONTINUED | OUTPATIENT
Start: 2020-10-06 | End: 2020-10-24

## 2020-10-06 RX ORDER — INSULIN LISPRO 100/ML
VIAL (ML) SUBCUTANEOUS
Refills: 0 | Status: DISCONTINUED | OUTPATIENT
Start: 2020-10-06 | End: 2020-10-20

## 2020-10-06 RX ORDER — TIOTROPIUM BROMIDE 18 UG/1
1 CAPSULE ORAL; RESPIRATORY (INHALATION) DAILY
Refills: 0 | Status: DISCONTINUED | OUTPATIENT
Start: 2020-10-06 | End: 2020-10-24

## 2020-10-06 RX ORDER — IPRATROPIUM/ALBUTEROL SULFATE 18-103MCG
3 AEROSOL WITH ADAPTER (GRAM) INHALATION EVERY 6 HOURS
Refills: 0 | Status: DISCONTINUED | OUTPATIENT
Start: 2020-10-06 | End: 2020-10-06

## 2020-10-06 RX ORDER — DEXTROSE 50 % IN WATER 50 %
25 SYRINGE (ML) INTRAVENOUS ONCE
Refills: 0 | Status: DISCONTINUED | OUTPATIENT
Start: 2020-10-06 | End: 2020-10-06

## 2020-10-06 RX ORDER — INSULIN LISPRO 100/ML
VIAL (ML) SUBCUTANEOUS
Refills: 0 | Status: DISCONTINUED | OUTPATIENT
Start: 2020-10-06 | End: 2020-10-06

## 2020-10-06 RX ORDER — POLYETHYLENE GLYCOL 3350 17 G/17G
17 POWDER, FOR SOLUTION ORAL DAILY
Refills: 0 | Status: DISCONTINUED | OUTPATIENT
Start: 2020-10-06 | End: 2020-10-24

## 2020-10-06 RX ORDER — DEXTROSE 50 % IN WATER 50 %
12.5 SYRINGE (ML) INTRAVENOUS ONCE
Refills: 0 | Status: DISCONTINUED | OUTPATIENT
Start: 2020-10-06 | End: 2020-10-24

## 2020-10-06 RX ORDER — ASCORBIC ACID 60 MG
500 TABLET,CHEWABLE ORAL DAILY
Refills: 0 | Status: DISCONTINUED | OUTPATIENT
Start: 2020-10-06 | End: 2020-10-09

## 2020-10-06 RX ORDER — ATORVASTATIN CALCIUM 80 MG/1
80 TABLET, FILM COATED ORAL AT BEDTIME
Refills: 0 | Status: DISCONTINUED | OUTPATIENT
Start: 2020-10-06 | End: 2020-10-24

## 2020-10-06 RX ORDER — PANTOPRAZOLE SODIUM 20 MG/1
40 TABLET, DELAYED RELEASE ORAL
Refills: 0 | Status: DISCONTINUED | OUTPATIENT
Start: 2020-10-06 | End: 2020-10-24

## 2020-10-06 RX ORDER — PIPERACILLIN AND TAZOBACTAM 4; .5 G/20ML; G/20ML
3.38 INJECTION, POWDER, LYOPHILIZED, FOR SOLUTION INTRAVENOUS ONCE
Refills: 0 | Status: COMPLETED | OUTPATIENT
Start: 2020-10-06 | End: 2020-10-06

## 2020-10-06 RX ORDER — SODIUM CHLORIDE 9 MG/ML
1000 INJECTION, SOLUTION INTRAVENOUS
Refills: 0 | Status: DISCONTINUED | OUTPATIENT
Start: 2020-10-06 | End: 2020-10-24

## 2020-10-06 RX ORDER — OXYCODONE HYDROCHLORIDE 5 MG/1
5 TABLET ORAL EVERY 6 HOURS
Refills: 0 | Status: DISCONTINUED | OUTPATIENT
Start: 2020-10-06 | End: 2020-10-06

## 2020-10-06 RX ORDER — AZITHROMYCIN 500 MG/1
500 TABLET, FILM COATED ORAL EVERY 24 HOURS
Refills: 0 | Status: DISCONTINUED | OUTPATIENT
Start: 2020-10-06 | End: 2020-10-09

## 2020-10-06 RX ORDER — CHOLECALCIFEROL (VITAMIN D3) 125 MCG
1000 CAPSULE ORAL DAILY
Refills: 0 | Status: DISCONTINUED | OUTPATIENT
Start: 2020-10-06 | End: 2020-10-24

## 2020-10-06 RX ORDER — ASPIRIN/CALCIUM CARB/MAGNESIUM 324 MG
81 TABLET ORAL DAILY
Refills: 0 | Status: DISCONTINUED | OUTPATIENT
Start: 2020-10-06 | End: 2020-10-24

## 2020-10-06 RX ORDER — GLUCAGON INJECTION, SOLUTION 0.5 MG/.1ML
1 INJECTION, SOLUTION SUBCUTANEOUS ONCE
Refills: 0 | Status: DISCONTINUED | OUTPATIENT
Start: 2020-10-06 | End: 2020-10-06

## 2020-10-06 RX ORDER — MONTELUKAST 4 MG/1
10 TABLET, CHEWABLE ORAL AT BEDTIME
Refills: 0 | Status: DISCONTINUED | OUTPATIENT
Start: 2020-10-06 | End: 2020-10-24

## 2020-10-06 RX ORDER — THEOPHYLLINE ANHYDROUS 200 MG
300 TABLET, EXTENDED RELEASE 12 HR ORAL
Refills: 0 | Status: DISCONTINUED | OUTPATIENT
Start: 2020-10-06 | End: 2020-10-07

## 2020-10-06 RX ORDER — DEXTROSE 50 % IN WATER 50 %
12.5 SYRINGE (ML) INTRAVENOUS ONCE
Refills: 0 | Status: DISCONTINUED | OUTPATIENT
Start: 2020-10-06 | End: 2020-10-06

## 2020-10-06 RX ORDER — APIXABAN 2.5 MG/1
5 TABLET, FILM COATED ORAL EVERY 12 HOURS
Refills: 0 | Status: DISCONTINUED | OUTPATIENT
Start: 2020-10-06 | End: 2020-10-24

## 2020-10-06 RX ORDER — LACTULOSE 10 G/15ML
15 SOLUTION ORAL
Refills: 0 | Status: DISCONTINUED | OUTPATIENT
Start: 2020-10-06 | End: 2020-10-24

## 2020-10-06 RX ORDER — BUMETANIDE 0.25 MG/ML
1 INJECTION INTRAMUSCULAR; INTRAVENOUS
Refills: 0 | Status: DISCONTINUED | OUTPATIENT
Start: 2020-10-06 | End: 2020-10-07

## 2020-10-06 RX ORDER — GLUCAGON INJECTION, SOLUTION 0.5 MG/.1ML
1 INJECTION, SOLUTION SUBCUTANEOUS ONCE
Refills: 0 | Status: DISCONTINUED | OUTPATIENT
Start: 2020-10-06 | End: 2020-10-24

## 2020-10-06 RX ORDER — DEXTROSE 50 % IN WATER 50 %
25 SYRINGE (ML) INTRAVENOUS ONCE
Refills: 0 | Status: DISCONTINUED | OUTPATIENT
Start: 2020-10-06 | End: 2020-10-24

## 2020-10-06 RX ORDER — TIOTROPIUM BROMIDE 18 UG/1
1 CAPSULE ORAL; RESPIRATORY (INHALATION) DAILY
Refills: 0 | Status: DISCONTINUED | OUTPATIENT
Start: 2020-10-06 | End: 2020-10-06

## 2020-10-06 RX ORDER — IPRATROPIUM/ALBUTEROL SULFATE 18-103MCG
3 AEROSOL WITH ADAPTER (GRAM) INHALATION ONCE
Refills: 0 | Status: COMPLETED | OUTPATIENT
Start: 2020-10-06 | End: 2020-10-06

## 2020-10-06 RX ORDER — INSULIN GLARGINE 100 [IU]/ML
10 INJECTION, SOLUTION SUBCUTANEOUS AT BEDTIME
Refills: 0 | Status: DISCONTINUED | OUTPATIENT
Start: 2020-10-06 | End: 2020-10-12

## 2020-10-06 RX ORDER — DEXTROSE 50 % IN WATER 50 %
15 SYRINGE (ML) INTRAVENOUS ONCE
Refills: 0 | Status: DISCONTINUED | OUTPATIENT
Start: 2020-10-06 | End: 2020-10-06

## 2020-10-06 RX ORDER — DILTIAZEM HCL 120 MG
180 CAPSULE, EXT RELEASE 24 HR ORAL DAILY
Refills: 0 | Status: DISCONTINUED | OUTPATIENT
Start: 2020-10-06 | End: 2020-10-08

## 2020-10-06 RX ORDER — SODIUM CHLORIDE 9 MG/ML
1000 INJECTION, SOLUTION INTRAVENOUS
Refills: 0 | Status: DISCONTINUED | OUTPATIENT
Start: 2020-10-06 | End: 2020-10-06

## 2020-10-06 RX ORDER — IPRATROPIUM/ALBUTEROL SULFATE 18-103MCG
3 AEROSOL WITH ADAPTER (GRAM) INHALATION
Refills: 0 | Status: DISCONTINUED | OUTPATIENT
Start: 2020-10-06 | End: 2020-10-07

## 2020-10-06 RX ORDER — INSULIN LISPRO 100/ML
VIAL (ML) SUBCUTANEOUS AT BEDTIME
Refills: 0 | Status: DISCONTINUED | OUTPATIENT
Start: 2020-10-06 | End: 2020-10-06

## 2020-10-06 RX ORDER — ALBUTEROL 90 UG/1
2 AEROSOL, METERED ORAL EVERY 4 HOURS
Refills: 0 | Status: DISCONTINUED | OUTPATIENT
Start: 2020-10-06 | End: 2020-10-06

## 2020-10-06 RX ORDER — ALBUTEROL 90 UG/1
1 AEROSOL, METERED ORAL EVERY 4 HOURS
Refills: 0 | Status: DISCONTINUED | OUTPATIENT
Start: 2020-10-06 | End: 2020-10-06

## 2020-10-06 RX ORDER — BUDESONIDE AND FORMOTEROL FUMARATE DIHYDRATE 160; 4.5 UG/1; UG/1
2 AEROSOL RESPIRATORY (INHALATION)
Refills: 0 | Status: DISCONTINUED | OUTPATIENT
Start: 2020-10-06 | End: 2020-10-24

## 2020-10-06 RX ORDER — CEFTRIAXONE 500 MG/1
1000 INJECTION, POWDER, FOR SOLUTION INTRAMUSCULAR; INTRAVENOUS EVERY 24 HOURS
Refills: 0 | Status: DISCONTINUED | OUTPATIENT
Start: 2020-10-06 | End: 2020-10-06

## 2020-10-06 RX ORDER — PIPERACILLIN AND TAZOBACTAM 4; .5 G/20ML; G/20ML
3.38 INJECTION, POWDER, LYOPHILIZED, FOR SOLUTION INTRAVENOUS EVERY 8 HOURS
Refills: 0 | Status: DISCONTINUED | OUTPATIENT
Start: 2020-10-06 | End: 2020-10-07

## 2020-10-06 RX ORDER — ACETAMINOPHEN 500 MG
650 TABLET ORAL EVERY 6 HOURS
Refills: 0 | Status: DISCONTINUED | OUTPATIENT
Start: 2020-10-06 | End: 2020-10-24

## 2020-10-06 RX ORDER — INSULIN GLARGINE 100 [IU]/ML
8 INJECTION, SOLUTION SUBCUTANEOUS AT BEDTIME
Refills: 0 | Status: DISCONTINUED | OUTPATIENT
Start: 2020-10-06 | End: 2020-10-06

## 2020-10-06 RX ADMIN — APIXABAN 5 MILLIGRAM(S): 2.5 TABLET, FILM COATED ORAL at 17:40

## 2020-10-06 RX ADMIN — PANTOPRAZOLE SODIUM 40 MILLIGRAM(S): 20 TABLET, DELAYED RELEASE ORAL at 07:34

## 2020-10-06 RX ADMIN — INSULIN GLARGINE 10 UNIT(S): 100 INJECTION, SOLUTION SUBCUTANEOUS at 23:18

## 2020-10-06 RX ADMIN — Medication 1 TABLET(S): at 11:25

## 2020-10-06 RX ADMIN — Medication 81 MILLIGRAM(S): at 11:25

## 2020-10-06 RX ADMIN — ATORVASTATIN CALCIUM 80 MILLIGRAM(S): 80 TABLET, FILM COATED ORAL at 22:34

## 2020-10-06 RX ADMIN — BUMETANIDE 1 MILLIGRAM(S): 0.25 INJECTION INTRAMUSCULAR; INTRAVENOUS at 06:32

## 2020-10-06 RX ADMIN — Medication 3 MILLILITER(S): at 06:09

## 2020-10-06 RX ADMIN — BUDESONIDE AND FORMOTEROL FUMARATE DIHYDRATE 2 PUFF(S): 160; 4.5 AEROSOL RESPIRATORY (INHALATION) at 07:33

## 2020-10-06 RX ADMIN — Medication 250 MILLIGRAM(S): at 06:59

## 2020-10-06 RX ADMIN — Medication 180 MILLIGRAM(S): at 06:32

## 2020-10-06 RX ADMIN — TIOTROPIUM BROMIDE 1 CAPSULE(S): 18 CAPSULE ORAL; RESPIRATORY (INHALATION) at 07:33

## 2020-10-06 RX ADMIN — BUMETANIDE 1 MILLIGRAM(S): 0.25 INJECTION INTRAMUSCULAR; INTRAVENOUS at 17:40

## 2020-10-06 RX ADMIN — Medication 8: at 12:11

## 2020-10-06 RX ADMIN — Medication 500 MILLIGRAM(S): at 11:25

## 2020-10-06 RX ADMIN — PIPERACILLIN AND TAZOBACTAM 25 GRAM(S): 4; .5 INJECTION, POWDER, LYOPHILIZED, FOR SOLUTION INTRAVENOUS at 23:18

## 2020-10-06 RX ADMIN — APIXABAN 5 MILLIGRAM(S): 2.5 TABLET, FILM COATED ORAL at 06:32

## 2020-10-06 RX ADMIN — Medication 650 MILLIGRAM(S): at 10:38

## 2020-10-06 RX ADMIN — Medication 4: at 08:09

## 2020-10-06 RX ADMIN — Medication 125 MILLIGRAM(S): at 02:39

## 2020-10-06 RX ADMIN — Medication 3 MILLILITER(S): at 02:39

## 2020-10-06 RX ADMIN — SENNA PLUS 2 TABLET(S): 8.6 TABLET ORAL at 22:35

## 2020-10-06 RX ADMIN — Medication 325 MILLIGRAM(S): at 11:25

## 2020-10-06 RX ADMIN — Medication 6: at 17:40

## 2020-10-06 RX ADMIN — PIPERACILLIN AND TAZOBACTAM 200 GRAM(S): 4; .5 INJECTION, POWDER, LYOPHILIZED, FOR SOLUTION INTRAVENOUS at 05:38

## 2020-10-06 RX ADMIN — Medication 3 MILLILITER(S): at 14:03

## 2020-10-06 RX ADMIN — Medication 300 MILLIGRAM(S): at 22:35

## 2020-10-06 RX ADMIN — AZITHROMYCIN 255 MILLIGRAM(S): 500 TABLET, FILM COATED ORAL at 13:57

## 2020-10-06 RX ADMIN — Medication 650 MILLIGRAM(S): at 09:38

## 2020-10-06 RX ADMIN — MONTELUKAST 10 MILLIGRAM(S): 4 TABLET, CHEWABLE ORAL at 22:34

## 2020-10-06 RX ADMIN — Medication 3 MILLILITER(S): at 21:05

## 2020-10-06 RX ADMIN — POLYETHYLENE GLYCOL 3350 17 GRAM(S): 17 POWDER, FOR SOLUTION ORAL at 11:25

## 2020-10-06 RX ADMIN — Medication 300 MILLIGRAM(S): at 11:25

## 2020-10-06 RX ADMIN — Medication 40 MILLIGRAM(S): at 13:58

## 2020-10-06 RX ADMIN — Medication 1000 UNIT(S): at 11:25

## 2020-10-06 NOTE — H&P ADULT - NSICDXPASTMEDICALHX_GEN_ALL_CORE_FT
PAST MEDICAL HISTORY:  Ascorbic acid deficiency     Constipation     End stage COPD on 3LNC    GERD without esophagitis     H/O Raynaud's syndrome     Heart failure     History of chronic atrial fibrillation on Eliquis    HLD (hyperlipidemia)     HTN (hypertension)     Iron deficiency anemia     Type 2 diabetes mellitus

## 2020-10-06 NOTE — ED ADULT NURSE NOTE - OBJECTIVE STATEMENT
Pt comes from rehab facility by EMS. Pt reports COPD exacerbation, normally on 3L NC oxygen all the time. Pt breathing labored but able to speak in full sentences. Pt on 5L NC at this time. Pt placed on telemetry monitor.

## 2020-10-06 NOTE — PROGRESS NOTE ADULT - PROBLEM SELECTOR PLAN 6
EKG showing sinus tachycardia  - on eliquis 5mg bid and cardizem 180mg qd at rehab, continue EKG showing sinus tachycardia  - on eliquis 5mg bid and cardizem 180mg qd at rehab, continue  - f/u TSH

## 2020-10-06 NOTE — DISCHARGE NOTE PROVIDER - NSDCCPCAREPLAN_GEN_ALL_CORE_FT
PRINCIPAL DISCHARGE DIAGNOSIS  Diagnosis: COPD (chronic obstructive pulmonary disease)  Assessment and Plan of Treatment: You came in for an exacerbation of your COPD. You were given BIPAP, nebulizers and oxygen to help you, which helped you to breathe better.   Follow up with your primary care physician within 1 week of discharge.         SECONDARY DISCHARGE DIAGNOSES  Diagnosis: Pneumonia  Assessment and Plan of Treatment: You were found to have an infection in both sides of your lungs. You were given antibiotics and you improved.  Follow up with your primary care physician within 1 week of discharge.        PRINCIPAL DISCHARGE DIAGNOSIS  Diagnosis: COPD (chronic obstructive pulmonary disease)  Assessment and Plan of Treatment: You came in for an exacerbation of your COPD. You were given BIPAP, nebulizers and oxygen to help you, which helped you to breathe better.   Follow up with your primary care physician within 1 week of discharge.         SECONDARY DISCHARGE DIAGNOSES  Diagnosis: Pneumonia  Assessment and Plan of Treatment: You were found to have an infection in both sides of your lungs. You were given antibiotics and you improved. Infectious disease Dr. Juarez was consulted to make sure you were given the correct treatment.   Follow up with your primary care physician within 1 week of discharge.        PRINCIPAL DISCHARGE DIAGNOSIS  Diagnosis: COPD (chronic obstructive pulmonary disease)  Assessment and Plan of Treatment: You came in for an exacerbation of your COPD. You were given BIPAP, nebulizers and oxygen to help you, which helped you to breathe better.   Follow up with your primary care physician within 1 week of discharge.         SECONDARY DISCHARGE DIAGNOSES  Diagnosis: MGUS (monoclonal gammopathy of unknown significance)  Assessment and Plan of Treatment: You were found to have a faint elevation of abnormal proteins in your blood. You were evaluated by hematology doctors, who recommend a repeat MGUS studies outpatient in 12 weeks to evaluate if the levels return to normal      Diagnosis: Pneumonia  Assessment and Plan of Treatment: You were found to have an infection in both sides of your lungs. You were given antibiotics and you improved. Infectious disease Dr. Juarez was consulted to make sure you were given the correct treatment.   Follow up with your primary care physician within 1 week of discharge.        PRINCIPAL DISCHARGE DIAGNOSIS  Diagnosis: COPD (chronic obstructive pulmonary disease)  Assessment and Plan of Treatment: You came in for an exacerbation of your COPD. You were given BIPAP, nebulizers and oxygen to help you, which helped you to breathe better.   Follow up with your primary care physician within 1 week of discharge. Continue to use trilogy as needed at home. Continue azitthromycin prophylaxis per your PMD        SECONDARY DISCHARGE DIAGNOSES  Diagnosis: MGUS (monoclonal gammopathy of unknown significance)  Assessment and Plan of Treatment: You were found to have a faint elevation of abnormal proteins in your blood. You were evaluated by hematology doctors, who recommend a repeat MGUS studies outpatient in 12 weeks to evaluate if the levels return to normal      Diagnosis: Pneumonia  Assessment and Plan of Treatment: You were found to have an infection in both sides of your lungs. You were given antibiotics and you improved. Infectious disease Dr. Juarez was consulted to make sure you were given the correct treatment.   Follow up with your primary care physician within 1 week of discharge.        PRINCIPAL DISCHARGE DIAGNOSIS  Diagnosis: COPD (chronic obstructive pulmonary disease)  Assessment and Plan of Treatment: You came in for an exacerbation of your COPD. You were given BIPAP, nebulizers and oxygen to help you, which helped you to breathe better.   Follow up with your primary care physician within 1 week of discharge. Continue to use trilogy as needed at home. Please continue to take prednisone 5mg tabs: 4 tabs orally once a day for two days (10/25-10/26), 2 tabs once a day for 7 days (10/27-11/2), 1 tab once a day for 7 days (11/3-11/9). Continue azitthromycin prophylaxis per your PMD        SECONDARY DISCHARGE DIAGNOSES  Diagnosis: MGUS (monoclonal gammopathy of unknown significance)  Assessment and Plan of Treatment: You were found to have a faint elevation of abnormal proteins in your blood. You were evaluated by hematology doctors, who recommend a repeat MGUS studies outpatient in 12 weeks to evaluate if the levels return to normal      Diagnosis: Pneumonia  Assessment and Plan of Treatment: You were found to have an infection in both sides of your lungs. You were given antibiotics and you improved. Infectious disease Dr. Juarez was consulted to make sure you were given the correct treatment.   Follow up with your primary care physician within 1 week of discharge.        PRINCIPAL DISCHARGE DIAGNOSIS  Diagnosis: COPD (chronic obstructive pulmonary disease)  Assessment and Plan of Treatment: You came in for an exacerbation of your COPD. You were given BIPAP, nebulizers and oxygen to help you, which helped you to breathe better.   Follow up with your primary care physician within 1 week of discharge. Continue to use trilogy as needed at home. Please continue to take prednisone 5mg tabs: 4 tabs orally once a day for two days (10/25-10/26), 2 tabs once a day for 7 days (10/27-11/2), 1 tab once a day for 7 days (11/3-11/9). Continue azitthromycin prophylaxis per your PMD/pulm, see within 1 week of discharge        SECONDARY DISCHARGE DIAGNOSES  Diagnosis: MGUS (monoclonal gammopathy of unknown significance)  Assessment and Plan of Treatment: You were found to have a faint elevation of abnormal proteins in your blood. You were evaluated by hematology doctors, who recommend a repeat MGUS studies outpatient in 12 weeks to evaluate if the levels return to normal      Diagnosis: Pneumonia  Assessment and Plan of Treatment: You were found to have an infection in both sides of your lungs. You were given antibiotics and you improved. Infectious disease Dr. Juarez was consulted to make sure you were given the correct treatment.   Follow up with your primary care physician within 1 week of discharge.

## 2020-10-06 NOTE — ADVANCED PRACTICE NURSE CONSULT - ASSESSMENT
Vital Signs Last 24 Hrs  T(C): 36.7 (06 Oct 2020 09:27), Max: 36.8 (06 Oct 2020 06:31)  T(F): 98 (06 Oct 2020 09:27), Max: 98.3 (06 Oct 2020 06:31)  HR: 76 (06 Oct 2020 09:27) (76 - 130)  BP: 149/68 (06 Oct 2020 09:27) (111/78 - 149/85)  BP(mean): --  RR: 20 (06 Oct 2020 09:27) (20 - 22)  SpO2: 92% (06 Oct 2020 09:27) (92% - 95%)     eGFR if Non African American: 60 mL/min/1.73M2 (10-06-20 @ 02:51)  eGFR if : 70 mL/min/1.73M2 (10-06-20 @ 02:51)      CAPILLARY BLOOD GLUCOSE      POCT Blood Glucose.: 326 mg/dL (06 Oct 2020 08:02)      DIET: CC

## 2020-10-06 NOTE — ADVANCED PRACTICE NURSE CONSULT - RECOMMEDATIONS
Type 2  A1c pending % adm PN  Recommend endocrine- if uncontrolled inpatient  FU appt:TBA  will return to provide diabetes education  Goal 100-180 mg/dL

## 2020-10-06 NOTE — PROGRESS NOTE ADULT - PROBLEM SELECTOR PLAN 5
recent TTE in 08/2020 showing normal LVEF, stage 1 diastolic dysfunction  -on bumetanide 1mg BID at rehab, continue  -no need for repeat TTE  -caution with excessive IVF recent TTE in 08/2020 showing normal LVEF, stage 1 diastolic dysfunction  -on bumetanide 1mg BID at rehab, continue  - f/u TTE to r/o cardiac component - ID rec  - caution with excessive IVF

## 2020-10-06 NOTE — ED PROVIDER NOTE - OBJECTIVE STATEMENT
BIBEMS from [facility] C/O SOB, hx of COPD.  shortness of breath 61 y/o female C/O SOB x 1 day  BIBEMS from [facility] C/O SOB, hx of COPD.  Recent discharge from [facility] for the same, no CP, no fever, no chills, no productive cough, no COVID, no stroke symptoms.  Took nebulizer at [place] with minimal benefit

## 2020-10-06 NOTE — H&P ADULT - NSHPSOCIALHISTORY_GEN_ALL_CORE
From [facility], prior to that lived at home alone but spent a lot of time with brother who used the neighboring space as an office space. ADLs independent, walks with a walker, former smoker, denies alcohol use, denies recreational drug use

## 2020-10-06 NOTE — H&P ADULT - ASSESSMENT
60F w/ end stage COPD on 3LNC (pending transplant list at [facility]), former smoker, CAD s/p stent (2016), afib on eliquis, uncontrolled DM2 (on insulin), raynaud phenomenon and recent hospitalization at [facility] for confusion and [facility] p/w sob, admitted for COPD exacerbation and multifocal PNA

## 2020-10-06 NOTE — CONSULT NOTE ADULT - SUBJECTIVE AND OBJECTIVE BOX
Date/Time Patient Seen:  		  Referring MD:   Data Reviewed	       Patient is a 60y old  Female who presents with a chief complaint of COPD exacerbation, PNA (06 Oct 2020 04:55)      Subjective/HPI  in bed  seen and examined  vs and meds reviewed  transferred [facility]  pt of Dr Quincy Mcnair in the community  end stage Emphysema  smoker  anxious and alert  dep on BIPAP at night time  60F w/ end stage COPD on 3LNC (pending transplant list at [facility]), former smoker, CAD s/p stent (2016), afib on eliquis, uncontrolled DM2 (on insulin), raynaud phenomenon and recent hospitalization at [facility] for confusion and AUR[facility]A p/[facility] sob. [facility] Patient states that she has had worsening shortness of breath for past two days. Unable to obtain comprehensive history due to dyspnea. Patient denies fever, chills, sore throat, cough, chest pain, palpitations, abd pain, n/v. Currently feels short of breath even after receiving duonebs and steroids in the ED. Unable to keep mask on during interview and lay back in stretcher due to subjective sob. Patient repeatedly stating that it is too hot in her room and fanning herself with a paper. Per chart, Dr. Mathew (PCP) has chart notes regarding possible hallucinations. Would like her home medications now but otherwise has no acute complaints.    In the ED, VS T97.7F  /78 RR 21 SpO2 94% on 4LNC. Labs significant for mild leukocytosis of 11.41, H/H 9.9/30.5, thrombophilia of 435. CO2 35, albumin 2.4.   CXR done showing b/l patchy infiltrates, official read pending  CT chest done showing multifocal PNA and reactive mediastinal lymphadenopathy  EKG read limited by artifact, sinus tachycardia with RBBB and premature supraventricular complexes    PAST SURGICAL HISTORY:  History of tonsillectomy.     FAMILY HISTORY:  Family history of CABG  FH: myocardial infarction.     Social History:  Social History (marital status, living situation, occupation, tobacco use, alcohol and drug use, and sexual history): From [facility], prior to that lived at home alone but spent a lot of time with brother who used the neighboring space as an office space. ADLs independent, walks with a walker, former smoker, denies alcohol use, denies recreational drug use     Tobacco Screening:  · Core Measure Site	Yes  · Has the patient used tobacco in the past 30 days?	No    Risk Assessment:    Present on Admission:  Deep Venous Thrombosis	no  Pulmonary Embolus	no     Heart Failure:  Does this patient have a history of or has been diagnosed with heart failure? yes.     LV Function Assessment (LVS function was evaluated before arrival and/or during hospitalization) yes.     Is the Ejection Fraction >40% ? yes.     normal LV function.    HIV Screen (per [facility] Department of Health, HIV screening must be offered to every individual between ages 13 and 64)	Offered and patient declined       PAST MEDICAL & SURGICAL HISTORY:  H/O Raynaud&#x27;s syndrome    Ascorbic acid deficiency    Iron deficiency anemia    Constipation    GERD without esophagitis    Type 2 diabetes mellitus    HTN (hypertension)    Heart failure    HLD (hyperlipidemia)    History of chronic atrial fibrillation  on Eliquis    End stage COPD  on 3LNC    Advanced COPD    History of tonsillectomy          Medication list         MEDICATIONS  (STANDING):  albuterol/ipratropium for Nebulization. 3 milliLiter(s) Nebulizer every 6 hours  apixaban 5 milliGRAM(s) Oral every 12 hours  ascorbic acid 500 milliGRAM(s) Oral daily  aspirin  chewable 81 milliGRAM(s) Oral daily  atorvastatin 80 milliGRAM(s) Oral at bedtime  azithromycin  IVPB 500 milliGRAM(s) IV Intermittent every 24 hours  budesonide 160 MICROgram(s)/formoterol 4.5 MICROgram(s) Inhaler 2 Puff(s) Inhalation two times a day  buMETAnide 1 milliGRAM(s) Oral two times a day  cefTRIAXone   IVPB 1000 milliGRAM(s) IV Intermittent every 24 hours  cholecalciferol 1000 Unit(s) Oral daily  dextrose 5%. 1000 milliLiter(s) (50 mL/Hr) IV Continuous <Continuous>  dextrose 50% Injectable 12.5 Gram(s) IV Push once  dextrose 50% Injectable 25 Gram(s) IV Push once  dextrose 50% Injectable 25 Gram(s) IV Push once  diltiazem    milliGRAM(s) Oral daily  ferrous    sulfate 325 milliGRAM(s) Oral daily  insulin glargine Injectable (LANTUS) 8 Unit(s) SubCutaneous at bedtime  insulin lispro (HumaLOG) corrective regimen sliding scale   SubCutaneous three times a day before meals  insulin lispro (HumaLOG) corrective regimen sliding scale   SubCutaneous at bedtime  methylPREDNISolone sodium succinate Injectable 40 milliGRAM(s) IV Push daily  montelukast 10 milliGRAM(s) Oral at bedtime  multivitamin 1 Tablet(s) Oral daily  pantoprazole    Tablet 40 milliGRAM(s) Oral before breakfast  polyethylene glycol 3350 17 Gram(s) Oral daily  senna 2 Tablet(s) Oral at bedtime  theophylline ER Capsule 300 milliGRAM(s) Oral <User Schedule>  tiotropium 18 MICROgram(s) Capsule 1 Capsule(s) Inhalation daily  vancomycin  IVPB 1000 milliGRAM(s) IV Intermittent once    MEDICATIONS  (PRN):  acetaminophen   Tablet .. 650 milliGRAM(s) Oral every 6 hours PRN Mild Pain (1 - 3)  ALBUTerol    90 MICROgram(s) HFA Inhaler 2 Puff(s) Inhalation every 4 hours PRN Shortness of Breath and/or Wheezing  bisacodyl Suppository 10 milliGRAM(s) Rectal daily PRN Constipation  dextrose 40% Gel 15 Gram(s) Oral once PRN Blood Glucose LESS THAN 70 milliGRAM(s)/deciliter  glucagon  Injectable 1 milliGRAM(s) IntraMuscular once PRN Glucose LESS THAN 70 milligrams/deciliter  guaiFENesin   Syrup  (Sugar-Free) 100 milliGRAM(s) Oral every 6 hours PRN Cough  lactulose Syrup 15 Gram(s) Oral two times a day PRN constipation  oxyCODONE    IR 5 milliGRAM(s) Oral every 6 hours PRN Moderate Pain (4 - 6)         Vitals log        ICU Vital Signs Last 24 Hrs  T(C): 36.8 (06 Oct 2020 06:31), Max: 36.8 (06 Oct 2020 06:31)  T(F): 98.3 (06 Oct 2020 06:31), Max: 98.3 (06 Oct 2020 06:31)  HR: 116 (06 Oct 2020 06:31) (116 - 130)  BP: 137/72 (06 Oct 2020 06:31) (111/78 - 138/80)  BP(mean): --  ABP: --  ABP(mean): --  RR: 20 (06 Oct 2020 06:31) (20 - 22)  SpO2: 95% (06 Oct 2020 06:31) (94% - 95%)           Input and Output:  I&O's Detail      Lab Data                        9.9    11.41 )-----------( 435      ( 06 Oct 2020 02:51 )             30.5     10-06    133<L>  |  90<L>  |  16  ----------------------------<  132<H>  3.8   |  35<H>  |  1.00    Ca    8.9      06 Oct 2020 02:51    TPro  7.8  /  Alb  2.4<L>  /  TBili  0.5  /  DBili  x   /  AST  32  /  ALT  14  /  AlkPhos  87  10-06            Review of Systems	  anxious  sob  kerr      Objective     Physical Examination    anxious  heart s1s2  lung dec BS  dim BS  verbal  cn grossly int      Pertinent Lab findings & Imaging      Rosado:  NO   Adequate UO     I&O's Detail           Discussed with:     Cultures:	        Radiology      EXAM:  CT CHEST                            PROCEDURE DATE:  10/06/2020          INTERPRETATION:  CLINICAL INFORMATION: Rule out pneumonia. Chronic obstructive pulmonary disease.    COMPARISON: Same day radiograph    PROCEDURE:  CT of the Chest was performed without intravenous contrast.  Sagittal and coronal reformats were performed. 3-D MIPS images were acquired on the axial plane.    FINDINGS:    LUNGS AND AIRWAYS: Patent central airways.  Centrilobular emphysema in both lungs. Numerous nodular densities with septal thickening showing tree-in-bud appearance in the right upper lobe, right middle lobe, left upper lobe and left lower lobe. There is no atelectasis.  PLEURA: No pleural effusion.  MEDIASTINUM AND LILIANA: 1.4 cm sized right paratracheal lymph node. A few subcentimeter mediastinal lymph nodes.  VESSELS: Moderate atherosclerotic calcification.  HEART: Heart size is normal. No pericardial effusion.  CHEST WALL AND LOWER NECK: Within normal limits.  VISUALIZED UPPER ABDOMEN: Within normal limits.  BONES: Degenerative change.    IMPRESSION:  Multifocal pneumonia involving both lungs as described.  Reactive mediastinal lymphadenopathy.              ROBBIE LEAL M.D., ATTENDING RADIOLOGIST

## 2020-10-06 NOTE — DISCHARGE NOTE PROVIDER - PROVIDER TOKENS
PROVIDER:[TOKEN:[[id number]]],PROVIDER:[TOKEN:[[id number]]] PROVIDER:[TOKEN:[[id number]]],PROVIDER:[TOKEN:[[id number]]],PROVIDER:[TOKEN:[[id number]]] PROVIDER:[TOKEN:[[id number]],FOLLOWUP:[1 week]],PROVIDER:[TOKEN:[[id number]]],PROVIDER:[TOKEN:[[id number]]]

## 2020-10-06 NOTE — H&P ADULT - PROBLEM SELECTOR PLAN 7
chronic, on multiple medications from rehab but patient states she only takes sennacot for constipation  -cont senna and miralax standing  -lactulose and dulcolax suppository PRN for constipation

## 2020-10-06 NOTE — PROGRESS NOTE ADULT - PROBLEM SELECTOR PLAN 2
on 3LNC at rehab, currently requiring 5L nc  -on spiriva, duoneb, theophylline, symbicort, montelukast, alb inhaler PRN at rehab, continue  - BIPAP qhs and PRN

## 2020-10-06 NOTE — H&P ADULT - PROBLEM SELECTOR PLAN 3
on metformin and glargine 12 U qhs, hold metformin while in hospital  -start glargine 8 U qhs and titrate up based on blood glucose  -expect blood glucose to increase due to steroids  -low dose ISS, accuchecks, hypoglycemia protocol

## 2020-10-06 NOTE — CONSULT NOTE ADULT - SUBJECTIVE AND OBJECTIVE BOX
[facility]  INFECTIOUS DISEASES   =======================================================    [facility]-[id number]  CHERI HARTMAN     CC: COPD exacerbation, Pneumonia    HPI:  59y/o woman with end stage COPD on 3L O2 at home awaiting transplant at [facility], former smoker, CAD s/p stent, afib, DM2), raynaud phenomenon and recent hospitalization at [facility] for confusion and [facility]. He is very short of breath at rest, worst with any movement, unable to complete her sentences. No fever. Has some cough but no sputum at this time.   CXR done showing b/l patchy infiltrates, official read pending  CT chest done showing multifocal PNA and reactive mediastinal lymphadenopathy  She has been started on azithromycin and ceftriaxone and ID was called for further recommendation.     PAST MEDICAL & SURGICAL HISTORY:  H/O Raynaud&#x27;s syndrome  Ascorbic acid deficiency  Iron deficiency anemia  Constipation  GERD without esophagitis  Type 2 diabetes mellitus  HTN (hypertension)  Heart failure  HLD (hyperlipidemia)  History of chronic atrial fibrillation  on Eliquis  End stage COPD  on 3LNC  History of tonsillectomy    Social Hx: former heavy smoker, no ETOH or drugs     FAMILY HISTORY:  FH: myocardial infarction    Family history of CABG    Allergies  No Known Allergies    Antibiotics:  Azithromycin   Ceftriaxone      REVIEW OF SYSTEMS:  CONSTITUTIONAL:  No Fever or chills  HEENT:  No diplopia or blurred vision.  No sore throat or runny nose.  CARDIOVASCULAR:  No chest pain or SOB.  RESPIRATORY:  No cough, severe SOB  GASTROINTESTINAL:  No nausea, vomiting or diarrhea.  GENITOURINARY:  No dysuria, frequency or urgency. No Blood in urine  MUSCULOSKELETAL:  no joint aches, no muscle pain  SKIN:  No change in skin, hair or nails.  NEUROLOGIC:  No paresthesias, fasciculations, seizures or weakness.  PSYCHIATRIC:  No disorder of thought or mood.  ENDOCRINE:  No heat or cold intolerance, polyuria or polydipsia.  HEMATOLOGICAL:  No easy bruising or bleeding.     Physical Exam:  Vital Signs Last 24 Hrs  T(C): 36.7 (06 Oct 2020 12:34), Max: 36.8 (06 Oct 2020 06:31)  T(F): 98.1 (06 Oct 2020 12:34), Max: 98.3 (06 Oct 2020 06:31)  HR: 54 (06 Oct 2020 14:07) (54 - 130)  BP: 135/74 (06 Oct 2020 12:34) (111/78 - 149/85)  RR: 20 (06 Oct 2020 12:34) (20 - 22)  SpO2: 95% (06 Oct 2020 14:07) (92% - 95%)  Height (cm): 162.6 (10-06 @ 01:24)  Weight (kg): 76.2 (10-06 @ 01:24)  BMI (kg/m2): 28.8 (10-06 @ 01:24)  BSA (m2): 1.82 (10-06 @ 01:24)  GEN: NAD  HEENT: normocephalic and atraumatic. EOMI. PERRL.    NECK: Supple.  No lymphadenopathy   LUNGS: very poor air movement but Clear to auscultation with no wheezing or rales .  HEART: Regular rate and rhythm   ABDOMEN: Soft, nontender, and nondistended.  Positive bowel sounds.    : No CVA tenderness  EXTREMITIES: Without any cyanosis, clubbing, rash, lesions or edema.  NEUROLOGIC: grossly intact.  PSYCHIATRIC: Appropriate affect .  SKIN: No ulceration or induration present.    Labs:  10-06    133<L>  |  90<L>  |  16  ----------------------------<  132<H>  3.8   |  35<H>  |  1.00    Ca    8.9      06 Oct 2020 02:51    TPro  7.8  /  Alb  2.4<L>  /  TBili  0.5  /  DBili  x   /  AST  32  /  ALT  14  /  AlkPhos  87  10-06                        9.9    11.41 )-----------( 435      ( 06 Oct 2020 02:51 )             30.5     LIVER FUNCTIONS - ( 06 Oct 2020 02:51 )  Alb: 2.4 g/dL / Pro: 7.8 g/dL / ALK PHOS: 87 U/L / ALT: 14 U/L / AST: 32 U/L / GGT: x           COVID-19 PCR: NotDetec (10-06-20 @ 03:23)    All imaging and other data have been reviewed.    < from: CT Chest No Cont (10.06.20 @ 04:52) >  IMPRESSION:  Multifocal pneumonia involving both lungs as described.  Reactive mediastinal lymphadenopathy.      Assessment and Plan:   59y/o woman with end stage COPD on 3L O2 at home awaiting transplant at [facility], former smoker, CAD s/p stent, afib, DM2), raynaud phenomenon and recent hospitalization at [facility] for confusion and AURORA has been sent from [facility] with worsening of SOB. CT with multilobar opacities. Due to recent hospitalization and rehab stay, will cover for possible HCAP.   Very high risk with a poor lung capacities.     COPD, Pneumonia  - Blood culture   - Sputum culture  - Legionella U ag   - CXR and CT with multilobar opacities   - Respiratory and pulmonary consults , regular nebulizer treatment and inhalers.   - TTE, rule out cardiac component  - Will switch ceftriaxone to zosyn 3.375gm q8h   - Continue azithromycin 500mg daily    Thank you for courtesy of this consult.     Will follow.    Wagner Juarez MD  Division of Infectious Diseases   Cell [phone] between 7am and 5pm   After 5pm and weekends please call ID service at [phone].

## 2020-10-06 NOTE — ED PROVIDER NOTE - CARE PLAN
Principal Discharge DX:	COPD (chronic obstructive pulmonary disease)   Principal Discharge DX:	COPD (chronic obstructive pulmonary disease)  Secondary Diagnosis:	Pneumonia

## 2020-10-06 NOTE — H&P ADULT - PROBLEM SELECTOR PLAN 1
likely multifactorial from multifocal PNA and COPD exacerbation  -s/p 125 mg IV solumedrol x1 in ED, continue 40 mg IV daily  -duonebs q6h standing with albuterol inh q4h PRN  -cont COPD medications from rehab  -CT chest done showing multifocal PNA  -s/p vanco and zosyn x1, continue rocephin and zithromax for CAP with atypical coverage, check MRSA swab given recent hospitalization in September  -O2 support as needed, currently on 5LNC  -f/u strep pneumo and legionella antigen  -pulm Dr. Barrow consulted, f/u recs

## 2020-10-06 NOTE — ADVANCED PRACTICE NURSE CONSULT - REASON FOR CONSULT
Patient is a 60y old  Female who presents with a chief complaint of COPD exacerbation, PNA (06 Oct 2020 06:41)      Type:2 unable to assess diabetes management, patient preferred to discuss situation prior to adm. will return to reassess.   DX year known complications Endocrine Last seen Rx home Hx DKA/HHS, Glucometer checks, needs, weight, diet, exercise  Hx ASCVD, CKD, HF    HPI:  60F w/ end stage COPD on 3LNC (pending transplant list at [facility]), former smoker, CAD s/p stent (2016), afib on eliquis, uncontrolled DM2 (on insulin), raynaud phenomenon and recent hospitalization at [facility] for confusion and AURORA p/w sob. [facility] Patient states that she has had worsening shortness of breath for past two days. Unable to obtain comprehensive history due to dyspnea. Patient denies fever, chills, sore throat, cough, chest pain, palpitations, abd pain, n/v. Currently feels short of breath even after receiving duonebs and steroids in the ED. Unable to keep mask on during interview and lay back in stretcher due to subjective sob. Patient repeatedly stating that it is too hot in her room and fanning herself with a paper. Per chart, Dr. Mathew (PCP) has chart notes regarding possible hallucinations. Would like her home medications now but otherwise has no acute complaints.    In the ED, VS T97.7F  /78 RR 21 SpO2 94% on 4LNC. Labs significant for mild leukocytosis of 11.41, H/H 9.9/30.5, thrombophilia of 435. CO2 35, albumin 2.4.   CXR done showing b/l patchy infiltrates, official read pending  CT chest done showing multifocal PNA and reactive mediastinal lymphadenopathy  EKG read limited by artifact, sinus tachycardia with RBBB and premature supraventricular complexes (06 Oct 2020 04:55)      PAST MEDICAL & SURGICAL HISTORY:  H/O Raynaud&#x27;s syndrome    Ascorbic acid deficiency    Iron deficiency anemia    Constipation    GERD without esophagitis    Type 2 diabetes mellitus    HTN (hypertension)    Heart failure    HLD (hyperlipidemia)    History of chronic atrial fibrillation  on Eliquis    End stage COPD  on 3LNC    History of tonsillectomy        MEDICATIONS  (STANDING):  apixaban 5 milliGRAM(s) Oral every 12 hours  ascorbic acid 500 milliGRAM(s) Oral daily  aspirin  chewable 81 milliGRAM(s) Oral daily  atorvastatin 80 milliGRAM(s) Oral at bedtime  azithromycin  IVPB 500 milliGRAM(s) IV Intermittent every 24 hours  budesonide 160 MICROgram(s)/formoterol 4.5 MICROgram(s) Inhaler 2 Puff(s) Inhalation two times a day  buMETAnide 1 milliGRAM(s) Oral two times a day  cefTRIAXone   IVPB 1000 milliGRAM(s) IV Intermittent every 24 hours  cholecalciferol 1000 Unit(s) Oral daily  dextrose 5%. 1000 milliLiter(s) (50 mL/Hr) IV Continuous <Continuous>  dextrose 50% Injectable 12.5 Gram(s) IV Push once  dextrose 50% Injectable 25 Gram(s) IV Push once  dextrose 50% Injectable 25 Gram(s) IV Push once  diltiazem    milliGRAM(s) Oral daily  ferrous    sulfate 325 milliGRAM(s) Oral daily  insulin glargine Injectable (LANTUS) 8 Unit(s) SubCutaneous at bedtime  insulin lispro (HumaLOG) corrective regimen sliding scale   SubCutaneous Before meals and at bedtime  methylPREDNISolone sodium succinate Injectable 40 milliGRAM(s) IV Push daily  montelukast 10 milliGRAM(s) Oral at bedtime  multivitamin 1 Tablet(s) Oral daily  pantoprazole    Tablet 40 milliGRAM(s) Oral before breakfast  polyethylene glycol 3350 17 Gram(s) Oral daily  senna 2 Tablet(s) Oral at bedtime  theophylline ER Capsule 300 milliGRAM(s) Oral <User Schedule>  tiotropium 18 MICROgram(s) Capsule 1 Capsule(s) Inhalation daily

## 2020-10-06 NOTE — PROGRESS NOTE ADULT - PROBLEM SELECTOR PLAN 1
likely multifactorial from multifocal PNA and COPD exacerbation  - CT chest done showing multifocal PNA  - continue solumedrol 40 mg IV daily  - duonebs q6h standing with albuterol inh q4h PRN  - cont COPD medications from rehab  - curerntly on rocephin and zithromax - will adjust pending ID recs   - currently on 5LNC, maintain O2 >/ 88  - f/u strep pneumo and legionella antigen  - pulm Dr. Barrow consulted, f/u recs likely multifactorial from multifocal PNA and COPD exacerbation  - CT chest done showing multifocal PNA  - continue solumedrol 40 mg IV daily  - duonebs q6h standing with albuterol inh q4h PRN  - cont COPD medications from rehab  - discontinued rocephin   - continue zithromax   - started zosyn   - currently on 5LNC, maintain O2 >/ 88  - f/u strep pneumo and legionella antigen  - pulm Dr. Barrow consulted, f/u recs  - consulted ID Dr Juarez - recs apprecaited likely multifactorial from multifocal PNA and COPD exacerbation  - CT chest done showing multifocal PNA  - continue solumedrol 40 mg IV daily  - duonebs q6h standing with albuterol inh q4h PRN  - cont COPD medications from rehab  - discontinued rocephin   - continue zithromax 500mg qd  - started zosyn 3.375mg q8h   - currently on 5LNC, maintain O2 >/ 88  - f/u strep pneumo and legionella antigen  - f/u TTE to r/o cardiac component   - pulm Dr. Barrow consulted, f/u recs  - consulted ID Dr Juarez - recs apprecaited likely multifactorial from multifocal PNA and COPD exacerbation  - CT chest done showing multifocal PNA  - VB.37/58/158/31  - continue solumedrol 40 mg IV daily  - duonebs q6h standing with albuterol inh q4h PRN  - cont COPD medications from rehab  - discontinued rocephin   - continue zithromax 500mg qd  - started zosyn 3.375mg q8h   - currently on 5LNC, maintain O2 >/ 88  - f/u strep pneumo and legionella antigen  - f/u TTE to r/o cardiac component   - pulm Dr. Barrow consulted, f/u recs  - consulted ID Dr Juarez - recs apprecaited likely multifactorial from multifocal PNA and COPD exacerbation  - CT chest done showing multifocal PNA  - VB.37/58/158/31  - continue solumedrol 40 mg IV daily  - duonebs q6h standing with albuterol inh q4h PRN  - cont COPD medications from rehab  - discontinued rocephin   - continue zithromax 500mg qd  - started zosyn 3.375mg q8h   - currently on 5LNC, maintain O2 >/ 88  - f/u strep pneumo and legionella antigen  - pulm Dr. Barrow consulted, f/u recs  - consulted ID Dr Juarez - recs apprecaited likely multifactorial from multifocal PNA and COPD exacerbation  - CT chest done showing multifocal PNA  - VB.37/58/158/31  - continue solumedrol 40 mg IV daily  - duonebs q6h standing with albuterol inh q4h PRN  - cont COPD medications from rehab  - discontinued rocephin   - continue zithromax 500mg qd  - started zosyn 3.375mg q8h   - currently on 5LNC, maintain O2 >/ 88  - f/u strep pneumo and legionella antigen  - f/u TTE to r/o cardiac component - ID rec  - pulm Dr. Barrow consulted, f/u recs  - consulted ID Dr Juarez - recs apprecaited likely multifactorial from multifocal PNA and COPD exacerbation  - CT chest done showing multifocal PNA  - VB.37/58/158/31  - continue solumedrol 40 mg IV daily  - duonebs q6h standing with albuterol inh q4h PRN  - cont COPD medications from rehab  - discontinued rocephin   - continue zithromax 500mg qd  - started zosyn 3.375mg q8h   - currently on 5LNC, maintain O2 >/ 88  - f/u strep pneumo and legionella antigen  - f/u TTE to r/o cardiac component - ID rec  - pulm Dr. Barrow consulted, recs appreciated   - consulted ID Dr Juarez - recs apprecaited likely multifactorial from multifocal PNA and COPD exacerbation  - CT chest done showing multifocal PNA  - VB.37/58/158/31  - continue solumedrol 40 mg IV daily  - duonebs q6h standing with albuterol inh q4h PRN  - cont COPD medications from rehab  - discontinued rocephin   - continue zithromax 500mg qd  - started zosyn 3.375mg q8h   - currently on 5LNC, maintain O2 >/ 88  - f/u strep pneumo and legionella antigen  - f/u TTE to r/o cardiac component - ID rec  - pulm Dr. Barrow consulted, recs appreciated   - consulted ID Dr Juarez - recs appreciated

## 2020-10-06 NOTE — PROGRESS NOTE ADULT - ASSESSMENT
60F w/ end stage COPD on 3LNC (pending transplant list at [facility]), former smoker, CAD s/p stent (2016), afib on eliquis, uncontrolled DM2 (on insulin), raynaud phenomenon and recent hospitalization at [facility] for confusion and AURORA p/w sob, admitted for COPD exacerbation and multifocal PNA. ID Dr Juarez to tailor abx, 5L NC currently, ween off O2 as tolerated.

## 2020-10-06 NOTE — DISCHARGE NOTE PROVIDER - CARE PROVIDER_API CALL
Anita Mathew  CRITICAL CARE MEDICINE  [address], [address]  [place] [address]  Phone: [phone]  Fax: [phone]  Follow Up Time:     Wagner Juarez  INFECTIOUS DISEASE  [address], [address]  [place] [address]  Phone: [phone]  Fax: [phone]  Follow Up Time:    Anita Mathew  CRITICAL CARE MEDICINE  [address], [address]  [place] [address]  Phone: [phone]  Fax: [phone]  Follow Up Time:     Wagner Juarez  INFECTIOUS DISEASE  [address], [address]  [place] [address]  Phone: [phone]  Fax: [phone]  Follow Up Time:     Debbi Zuniga  HEMATOLOGY  [address], [address]  [place] [address]  Phone: [phone]  Fax: [phone]  Follow Up Time:    Anita Mathew  CRITICAL CARE MEDICINE  [address], [address]  [place] [address]  Phone: [phone]  Fax: [phone]  Follow Up Time: 1 week    Wagner Juarez  INFECTIOUS DISEASE  [address], [address]  [place] [address]  Phone: [phone]  Fax: [phone]  Follow Up Time:     Debbi Zuniga  HEMATOLOGY  [address], [address]  [place] [address]  Phone: [phone]  Fax: [phone]  Follow Up Time:

## 2020-10-06 NOTE — PROGRESS NOTE ADULT - PROBLEM SELECTOR PLAN 7
chronic, on multiple medications from rehab but patient states she only takes sennacot for constipation  - cont senna and miralax standing  - lactulose and dulcolax suppository PRN for constipation

## 2020-10-06 NOTE — DISCHARGE NOTE PROVIDER - NSDCFUADDAPPT_GEN_ALL_CORE_FT
Follow up with your primary care physician Dr. Mathew within 1 week of discharge.   Follow up with infectious disease Dr. Juarez within 1 week of discharge. Follow up with your primary care physician Dr. Mathew within 1 week of discharge.   Follow up with infectious disease Dr. Juarez within 1 week of discharge.   Follow up with hematology within 12 weeks of discharge

## 2020-10-06 NOTE — H&P ADULT - PROBLEM SELECTOR PLAN 2
on 3LNC at rehab  -on spiriva, duoneb, theophylline, symbicort, montelukast, alb inhaler PRN at rehab, continue

## 2020-10-06 NOTE — H&P ADULT - PROBLEM SELECTOR PLAN 5
recent TTE in 08/2020 showing normal LVEF, stage 1 diastolic dysfunction  -on bumetanide at rehab, continue  -no need for repeat TTE  -caution with excessive IVF

## 2020-10-06 NOTE — PROGRESS NOTE ADULT - SUBJECTIVE AND OBJECTIVE BOX
Patient is a 60y old  Female who presents with a chief complaint of COPD exacerbation, PNA (06 Oct 2020 06:41)      INTERVAL HPI/OVERNIGHT EVENTS: Patient examined at bedside. No overnight events occurred. Patient has no complaints at this time. Denies fevers, chills, headache, lightheadedness, chest pain,  abdominal pain, n/v/d/c. Patient denies cough however admits being SOB. Frustrated by extensive treatment course.     MEDICATIONS  (STANDING):  apixaban 5 milliGRAM(s) Oral every 12 hours  ascorbic acid 500 milliGRAM(s) Oral daily  aspirin  chewable 81 milliGRAM(s) Oral daily  atorvastatin 80 milliGRAM(s) Oral at bedtime  azithromycin  IVPB 500 milliGRAM(s) IV Intermittent every 24 hours  budesonide 160 MICROgram(s)/formoterol 4.5 MICROgram(s) Inhaler 2 Puff(s) Inhalation two times a day  buMETAnide 1 milliGRAM(s) Oral two times a day  cefTRIAXone   IVPB 1000 milliGRAM(s) IV Intermittent every 24 hours  cholecalciferol 1000 Unit(s) Oral daily  dextrose 5%. 1000 milliLiter(s) (50 mL/Hr) IV Continuous <Continuous>  dextrose 50% Injectable 12.5 Gram(s) IV Push once  dextrose 50% Injectable 25 Gram(s) IV Push once  dextrose 50% Injectable 25 Gram(s) IV Push once  diltiazem    milliGRAM(s) Oral daily  ferrous    sulfate 325 milliGRAM(s) Oral daily  insulin glargine Injectable (LANTUS) 8 Unit(s) SubCutaneous at bedtime  insulin lispro (HumaLOG) corrective regimen sliding scale   SubCutaneous Before meals and at bedtime  methylPREDNISolone sodium succinate Injectable 40 milliGRAM(s) IV Push daily  montelukast 10 milliGRAM(s) Oral at bedtime  multivitamin 1 Tablet(s) Oral daily  pantoprazole    Tablet 40 milliGRAM(s) Oral before breakfast  polyethylene glycol 3350 17 Gram(s) Oral daily  senna 2 Tablet(s) Oral at bedtime  theophylline ER Capsule 300 milliGRAM(s) Oral <User Schedule>  tiotropium 18 MICROgram(s) Capsule 1 Capsule(s) Inhalation daily    MEDICATIONS  (PRN):  acetaminophen   Tablet .. 650 milliGRAM(s) Oral every 6 hours PRN Mild Pain (1 - 3)  albuterol/ipratropium for Nebulization 3 milliLiter(s) Nebulizer every 3 hours PRN Shortness of Breath and/or Wheezing  bisacodyl Suppository 10 milliGRAM(s) Rectal daily PRN Constipation  dextrose 40% Gel 15 Gram(s) Oral once PRN Blood Glucose LESS THAN 70 milliGRAM(s)/deciliter  glucagon  Injectable 1 milliGRAM(s) IntraMuscular once PRN Glucose LESS THAN 70 milligrams/deciliter  guaiFENesin   Syrup  (Sugar-Free) 100 milliGRAM(s) Oral every 6 hours PRN Cough  lactulose Syrup 15 Gram(s) Oral two times a day PRN constipation  oxyCODONE    IR 5 milliGRAM(s) Oral every 6 hours PRN Moderate Pain (4 - 6)      Allergies    No Known Allergies    Intolerances        REVIEW OF SYSTEMS:  CONSTITUTIONAL: No fever or chills  HEENT:  No headache, no sore throat  RESPIRATORY: No cough, wheezing, admits shortness of breath  CARDIOVASCULAR: No chest pain, palpitations  GASTROINTESTINAL: No abd pain, nausea, vomiting, or diarrhea  GENITOURINARY: No dysuria, frequency, or hematuria  NEUROLOGICAL: no focal weakness or dizziness  MUSCULOSKELETAL: no myalgias     Vital Signs Last 24 Hrs  T(C): 36.7 (06 Oct 2020 09:27), Max: 36.8 (06 Oct 2020 06:31)  T(F): 98 (06 Oct 2020 09:27), Max: 98.3 (06 Oct 2020 06:31)  HR: 76 (06 Oct 2020 09:27) (76 - 130)  BP: 149/68 (06 Oct 2020 09:27) (111/78 - 149/85)  BP(mean): --  RR: 20 (06 Oct 2020 09:27) (20 - 22)  SpO2: 92% (06 Oct 2020 09:27) (92% - 95%)    PHYSICAL EXAM:  GENERAL: NAD, 5L NC, feeling frustrated   HEENT:  anicteric, moist mucous membranes  CHEST/LUNG: no rales, wheezes, or rhonchi, decreased breath sounds b/l  HEART:  RRR, S1, S2  ABDOMEN:  BS+, soft, nontender, nondistended  EXTREMITIES: no edema, cyanosis, or calf tenderness  NERVOUS SYSTEM: answers questions and follows commands appropriately    LABS:                        9.9    11.41 )-----------( 435      ( 06 Oct 2020 02:51 )             30.5     CBC Full  -  ( 06 Oct 2020 02:51 )  WBC Count : 11.41 K/uL  Hemoglobin : 9.9 g/dL  Hematocrit : 30.5 %  Platelet Count - Automated : 435 K/uL  Mean Cell Volume : 76.4 fl  Mean Cell Hemoglobin : 24.8 pg  Mean Cell Hemoglobin Concentration : 32.5 gm/dL  Auto Neutrophil # : x  Auto Lymphocyte # : x  Auto Monocyte # : x  Auto Eosinophil # : x  Auto Basophil # : x  Auto Neutrophil % : x  Auto Lymphocyte % : x  Auto Monocyte % : x  Auto Eosinophil % : x  Auto Basophil % : x    06 Oct 2020 02:51    133    |  90     |  16     ----------------------------<  132    3.8     |  35     |  1.00     Ca    8.9        06 Oct 2020 02:51    TPro  7.8    /  Alb  2.4    /  TBili  0.5    /  DBili  x      /  AST  32     /  ALT  14     /  AlkPhos  87     06 Oct 2020 02:51        CAPILLARY BLOOD GLUCOSE      POCT Blood Glucose.: 326 mg/dL (06 Oct 2020 08:02)          RADIOLOGY & ADDITIONAL TESTS:    Personally reviewed.     Consultant(s) Notes Reviewed:  [x] YES  [ ] NO

## 2020-10-06 NOTE — H&P ADULT - NSHPREVIEWOFSYSTEMS_GEN_ALL_CORE
CONSTITUTIONAL: denies fever, chills, fatigue, weakness  HEENT: denies blurred visions, sore throat  SKIN: denies new lesions, rash  CARDIOVASCULAR: denies chest pain, chest pressure, palpitations  RESPIRATORY: admits shortness of breath, denies sputum production  GASTROINTESTINAL: admits constipation, denies nausea, vomiting, diarrhea, abdominal pain  GENITOURINARY: denies dysuria, discharge  NEUROLOGICAL: denies numbness, headache, focal weakness  MUSCULOSKELETAL: denies new joint pain, muscle aches  HEMATOLOGIC: denies gross bleeding, bruising  LYMPHATICS: denies enlarged lymph nodes, extremity swelling  ENDOCRINOLOGIC: admits heat intolerance, denies sweating, cold intolerance

## 2020-10-06 NOTE — ED ADULT NURSE NOTE - CHPI ED NUR SYMPTOMS NEG
no cough/no wheezing/no edema/no fever/no headache/no body aches/no diaphoresis/no hemoptysis/no chills/no chest pain

## 2020-10-06 NOTE — DISCHARGE NOTE PROVIDER - CARE PROVIDERS DIRECT ADDRESSES
,[email or web address]

## 2020-10-06 NOTE — H&P ADULT - NSHPPHYSICALEXAM_GEN_ALL_CORE
T(C): 36.5 (10-06-20 @ 01:24), Max: 36.5 (10-06-20 @ 01:24)  HR: 120 (10-06-20 @ 04:32) (120 - 130)  BP: 138/80 (10-06-20 @ 04:32) (111/78 - 138/80)  RR: 22 (10-06-20 @ 04:32) (21 - 22)  SpO2: 95% (10-06-20 @ 04:32) (94% - 95%)    GENERAL: patient appears tachypneic, no acute distress, but visibly anxious, easily irritable, unable to lay back in stretcher  EYES: sclera clear, no exudates  LUNGS: poor air entry bilaterally, markedly diminished breath sounds  HEART: soft S1/S2, regular rate and rhythm, no murmurs noted, no lower extremity edema  GASTROINTESTINAL: abdomen is soft, nontender, nondistended, normoactive bowel sounds  MUSCULOSKELETAL: no clubbing or cyanosis, no obvious deformity  NEUROLOGIC: awake, alert, oriented x3, good muscle tone in 4 extremities, no obvious sensory deficits

## 2020-10-06 NOTE — PROGRESS NOTE ADULT - PROBLEM SELECTOR PLAN 3
on metformin and glargine 12 U qhs, hold metformin while in hospital  - glargine 10units qhs - tirated up from 8units in setting of steroids, hyperglycemia   - moderate dose ISS  - accuchecks, hypoglycemia protocol on metformin and glargine 12 U qhs, hold metformin while in hospital  - glargine 10units qhs - tirated up from 8units in setting of steroids, hyperglycemia   - moderate dose ISS  - accuchecks, hypoglycemia protocol  - f/u a1c

## 2020-10-06 NOTE — ED ADULT NURSE REASSESSMENT NOTE - NS ED NURSE REASSESS COMMENT FT1
Report received, care assumed. patient awake, alert and oriented. Nasal cannula in place. Cardiac monitor in place. PIV intact. Plan of care discussed with patient. Comfort and safety maintained. Will continue to monitor.

## 2020-10-06 NOTE — DISCHARGE NOTE PROVIDER - HOSPITAL COURSE
FROM ADMISSION H+P:   HPI:  60F w/ end stage COPD on 3LNC (pending transplant list at [facility]), former smoker, CAD s/p stent (2016), afib on eliquis, uncontrolled DM2 (on insulin), raynaud phenomenon and recent hospitalization at [facility] for confusion and AURORA p/w sob. [facility] Patient states that she has had worsening shortness of breath for past two days. Unable to obtain comprehensive history due to dyspnea. Patient denies fever, chills, sore throat, cough, chest pain, palpitations, abd pain, n/v. Currently feels short of breath even after receiving duonebs and steroids in the ED. Unable to keep mask on during interview and lay back in stretcher due to subjective sob. Patient repeatedly stating that it is too hot in her room and fanning herself with a paper. Per chart, Dr. Mathew (PCP) has chart notes regarding possible hallucinations. Would like her home medications now but otherwise has no acute complaints.    In the ED, VS T97.7F  /78 RR 21 SpO2 94% on 4LNC. Labs significant for mild leukocytosis of 11.41, H/H 9.9/30.5, thrombophilia of 435. CO2 35, albumin 2.4.   CXR done showing b/l patchy infiltrates, official read pending  CT chest done showing multifocal PNA and reactive mediastinal lymphadenopathy  EKG read limited by artifact, sinus tachycardia with RBBB and premature supraventricular complexes (06 Oct 2020 04:55)      ---  HOSPITAL COURSE: Patient was medically optimized and improved clinically throughout hospital course. Patient seen and examined on day of discharge. Patient is medically stable for discharge to ______ with outpatient follow up.     ---  CONSULTANTS:     ---  TIME SPENT:  I, the attending physician, was physically present for the key portions of the evaluation and management (E/M) service provided. The total amount of time spent reviewing the hospital notes, laboratory values, imaging findings, assessing/counseling the patient, discussing with consultant physicians, social work, nursing staff was -- minutes    ---  Primary care provider was made aware of plan for discharge:      [  ] NO     [  ] YES   FROM ADMISSION H+P:   HPI:  60F w/ end stage COPD on 3LNC (pending transplant list at [facility]), former smoker, CAD s/p stent (2016), afib on eliquis, uncontrolled DM2 (on insulin), raynaud phenomenon and recent hospitalization at [facility] for confusion and AURORA p/w sob. [facility] Patient states that she has had worsening shortness of breath for past two days. Unable to obtain comprehensive history due to dyspnea. Patient denies fever, chills, sore throat, cough, chest pain, palpitations, abd pain, n/v. Currently feels short of breath even after receiving duonebs and steroids in the ED. Unable to keep mask on during interview and lay back in stretcher due to subjective sob. Patient repeatedly stating that it is too hot in her room and fanning herself with a paper. Per chart, Dr. Mathew (PCP) has chart notes regarding possible hallucinations. Would like her home medications now but otherwise has no acute complaints.    In the ED, VS T97.7F  /78 RR 21 SpO2 94% on 4LNC. Labs significant for mild leukocytosis of 11.41, H/H 9.9/30.5, thrombophilia of 435. CO2 35, albumin 2.4.   CXR done showing b/l patchy infiltrates, official read pending  CT chest done showing multifocal PNA and reactive mediastinal lymphadenopathy  EKG read limited by artifact, sinus tachycardia with RBBB and premature supraventricular complexes (06 Oct 2020 04:55)      ---  HOSPITAL COURSE: Patient admitted for COPD exacerbation, acute respiratory failure and pneumonia. Patient was treated with home COPD medications, solumedrol, given supplemental oxygen and started on antibiotics. Infectious disease Dr. Juarez was consulted and recommended __________. Patient became hyperglycemic with setting of steroids and home lantus was increased from 8 to 10 units qhs as well as a moderate dose sliding scale was used to maintain glucose control. Patients chronic conditions were treated with home medications.   Patient was medically optimized and improved clinically throughout hospital course. Patient seen and examined on day of discharge. Patient is medically stable for discharge to ______ with outpatient follow up.     ---  CONSULTANTS:   Infectious disease Dr. Juarez     ---  TIME SPENT:  I, the attending physician, was physically present for the key portions of the evaluation and management (E/M) service provided. The total amount of time spent reviewing the hospital notes, laboratory values, imaging findings, assessing/counseling the patient, discussing with consultant physicians, social work, nursing staff was -- minutes    ---  Primary care provider was made aware of plan for discharge:      [  ] NO     [  ] YES   FROM ADMISSION H+P:   HPI:  60F w/ end stage COPD on 3LNC (pending transplant list at [facility]), former smoker, CAD s/p stent (2016), afib on eliquis, uncontrolled DM2 (on insulin), raynaud phenomenon and recent hospitalization at [facility] for confusion and AURORA p/w sob[facility] Patient states that she has had worsening shortness of breath for past two days. Unable to obtain comprehensive history due to dyspnea. Patient denies fever, chills, sore throat, cough, chest pain, palpitations, abd pain, n/v. Currently feels short of breath even after receiving duonebs and steroids in the ED. Unable to keep mask on during interview and lay back in stretcher due to subjective sob. Patient repeatedly stating that it is too hot in her room and fanning herself with a paper. Per chart, Dr. Mathew (PCP) has chart notes regarding possible hallucinations. Would like her home medications now but otherwise has no acute complaints.    In the ED, VS T97.7F  /78 RR 21 SpO2 94% on 4LNC. Labs significant for mild leukocytosis of 11.41, H/H 9.9/30.5, thrombophilia of 435. CO2 35, albumin 2.4.   CXR done showing b/l patchy infiltrates, official read pending  CT chest done showing multifocal PNA and reactive mediastinal lymphadenopathy  EKG read limited by artifact, sinus tachycardia with RBBB and premature supraventricular complexes (06 Oct 2020 04:55)      ---  HOSPITAL COURSE: Patient admitted for COPD exacerbation, acute respiratory failure and pneumonia. Patient was treated with home COPD medications, solumedrol, given supplemental oxygen and started on antibiotics. Pulmonology Dr Barrow was consulted and recommended continuing home medications and treatments, as well as sputum culture which showed _______.  Serum Theophylline level normal. Infectious disease Dr. Juarez was consulted and recommended __________. Patient became hyperglycemic with setting of steroids and home lantus was increased from 8 to 10 units qhs as well as a moderate dose sliding scale was used to maintain glucose control. Patients chronic conditions were treated with home medications.   Patient was medically optimized and improved clinically throughout hospital course. Patient seen and examined on day of discharge. Patient is medically stable for discharge to ______ with outpatient follow up.     ---  CONSULTANTS:   Infectious disease Dr. Juarez   Pulmonology Dr Barrow    ---  TIME SPENT:  I, the attending physician, was physically present for the key portions of the evaluation and management (E/M) service provided. The total amount of time spent reviewing the hospital notes, laboratory values, imaging findings, assessing/counseling the patient, discussing with consultant physicians, social work, nursing staff was -- minutes    ---  Primary care provider was made aware of plan for discharge:      [  ] NO     [  ] YES   FROM ADMISSION H+P:   HPI:  60F w/ end stage COPD on 3LNC (pending transplant list at [facility]), former smoker, CAD s/p stent (2016), afib on eliquis, uncontrolled DM2 (on insulin), raynaud phenomenon and recent hospitalization at [facility] for confusion and AURORA p/w sob. [facility] Patient states that she has had worsening shortness of breath for past two days. Unable to obtain comprehensive history due to dyspnea. Patient denies fever, chills, sore throat, cough, chest pain, palpitations, abd pain, n/v. Currently feels short of breath even after receiving duonebs and steroids in the ED. Unable to keep mask on during interview and lay back in stretcher due to subjective sob. Patient repeatedly stating that it is too hot in her room and fanning herself with a paper. Per chart, Dr. Mathew (PCP) has chart notes regarding possible hallucinations. Would like her home medications now but otherwise has no acute complaints.    In the ED, VS T97.7F  /78 RR 21 SpO2 94% on 4LNC. Labs significant for mild leukocytosis of 11.41, H/H 9.9/30.5, thrombophilia of 435. CO2 35, albumin 2.4.   CXR done showing b/l patchy infiltrates, official read pending  CT chest done showing multifocal PNA and reactive mediastinal lymphadenopathy  EKG read limited by artifact, sinus tachycardia with RBBB and premature supraventricular complexes (06 Oct 2020 04:55)      ---  HOSPITAL COURSE: Patient admitted for COPD exacerbation, acute respiratory failure and pneumonia. Patient was treated with home COPD medications, solumedrol, given supplemental oxygen and started on antibiotics. Pulmonology Dr Barrow was consulted and recommended continuing home medications and treatments, as well as sputum culture which showed normal respiratory richie. Blood cultures showed no growth. Pt treated with spiriva, symbicort, montelukast,  inhaler PRN  and mucinex. Serum Theophylline level normal. Infectious disease Dr. Juarez was consulted and recommended several courses of different antibiotics. Pt most recently on IV Rocephin and po azithromycin. Patient became hyperglycemic with setting of steroids and home lantus was increased from 8 to 10 units qhs as well as a moderate dose sliding scale with premeal insulin added as well was used to maintain glucose control. Pt was sinus tachy upon admission and developed paroxysms of afib. Seen by cardiologist Dr. Whitley. Dilitizem dose was increased to 240 mg po qd. Home theophylline was held due to tachycardia. Duonebs switched to Xopenex due to tachycardia. HR improved. O2 sats were monitored throughout hospital course as patient weaned down to home O2 requirements. BIPAP used intermittently as needed. Throughout stay pt's BUN/Cr increased and developed an AURORA of unknown etiology. Nephrologist Dr. Mullen was consulted. Bumex was stopped for AURORA and pt was offered albumen which she refused. Renal u/s - no hydronephrosis.  Throughout course BUN/Cr improved. Patients chronic conditions were treated with home medications.   Patient was medically optimized and improved clinically throughout hospital course. Patient seen and examined on day of discharge. Patient is medically stable for discharge to ______ with outpatient follow up.     ---  CONSULTANTS:   Infectious disease Dr. Juarez   Pulmonology Dr Barrow  Nephrology Dr. Mullen   ---  TIME SPENT:  I, the attending physician, was physically present for the key portions of the evaluation and management (E/M) service provided. The total amount of time spent reviewing the hospital notes, laboratory values, imaging findings, assessing/counseling the patient, discussing with consultant physicians, social work, nursing staff was -- minutes    ---  Primary care provider was made aware of plan for discharge:      [  ] NO     [  ] YES   FROM ADMISSION H+P:   HPI:  60F w/ end stage COPD on 3LNC (pending transplant list at [facility]), former smoker, CAD s/p stent (2016), afib on eliquis, uncontrolled DM2 (on insulin), raynaud phenomenon and recent hospitalization at [facility] for confusion and AURORA p/w sob. [facility] Patient states that she has had worsening shortness of breath for past two days. Unable to obtain comprehensive history due to dyspnea. Patient denies fever, chills, sore throat, cough, chest pain, palpitations, abd pain, n/v. Currently feels short of breath even after receiving duonebs and steroids in the ED. Unable to keep mask on during interview and lay back in stretcher due to subjective sob. Patient repeatedly stating that it is too hot in her room and fanning herself with a paper. Per chart, Dr. Mathew (PCP) has chart notes regarding possible hallucinations. Would like her home medications now but otherwise has no acute complaints.    In the ED, VS T97.7F  /78 RR 21 SpO2 94% on 4LNC. Labs significant for mild leukocytosis of 11.41, H/H 9.9/30.5, thrombophilia of 435. CO2 35, albumin 2.4.   CXR done showing b/l patchy infiltrates, official read pending  CT chest done showing multifocal PNA and reactive mediastinal lymphadenopathy  EKG read limited by artifact, sinus tachycardia with RBBB and premature supraventricular complexes (06 Oct 2020 04:55)      ---  HOSPITAL COURSE: Patient admitted for COPD exacerbation, acute respiratory failure and pneumonia. Patient was treated with home COPD medications, solumedrol, given supplemental oxygen and started on antibiotics. Pulmonology Dr Barrow was consulted and recommended continuing home medications and treatments, as well as sputum culture which showed normal respiratory richie. Blood cultures showed no growth. Pt treated with spiriva, symbicort, montelukast,  inhaler PRN  and mucinex. Serum Theophylline level normal. Infectious disease Dr. Juarez was consulted and completed course of rocephin. However, sputum cultures showed mold like fungus; patient had labwork drawn to rule out ASPA. Heme/onc was also consulted in the setting of sputum results and positive SPEP to R/o multiple myeloma. Patient was recommended repeat MGUS studies outpatient. Patient became hyperglycemic with setting of steroids and home lantus was increased from 8 to 10 units qhs as well as a moderate dose sliding scale with premeal insulin added as well was used to maintain glucose control. Pt was sinus tachy upon admission and developed paroxysms of afib. Seen by cardiologist Dr. Whitley. Dilitizem dose was increased to 240 mg po qd. Home theophylline was held due to tachycardia. Duonebs switched to Xopenex due to tachycardia. HR improved. O2 sats were monitored throughout hospital course as patient weaned down to home O2 requirements. BIPAP used intermittently as needed. Throughout stay pt's BUN/Cr increased and developed an AURORA of unknown etiology. Nephrologist Dr. Mullen was consulted. Bumex was stopped for AURORA and pt was offered albumen which she refused. Renal u/s - no hydronephrosis.  Throughout course BUN/Cr improved and patient was restarted on bumex. Patients chronic conditions were treated with home medications.   Patient was medically optimized and improved clinically throughout hospital course. Patient seen and examined on day of discharge. Patient is medically stable for discharge to ______ with outpatient follow up.     ---  CONSULTANTS:   Infectious disease Dr. Juarez   Pulmonology Dr Barrow  Nephrology Dr. Mullen   ---  TIME SPENT:  I, the attending physician, was physically present for the key portions of the evaluation and management (E/M) service provided. The total amount of time spent reviewing the hospital notes, laboratory values, imaging findings, assessing/counseling the patient, discussing with consultant physicians, social work, nursing staff was -- minutes    ---  Primary care provider was made aware of plan for discharge:      [  ] NO     [  ] YES   FROM ADMISSION H+P:   HPI:  60F w/ end stage COPD on 3LNC (pending transplant list at [facility]), former smoker, CAD s/p stent (2016), afib on eliquis, uncontrolled DM2 (on insulin), raynaud phenomenon and recent hospitalization at [facility] for confusion and AURORA p/w sob. [facility] Patient states that she has had worsening shortness of breath for past two days. Unable to obtain comprehensive history due to dyspnea. Patient denies fever, chills, sore throat, cough, chest pain, palpitations, abd pain, n/v. Currently feels short of breath even after receiving duonebs and steroids in the ED. Unable to keep mask on during interview and lay back in stretcher due to subjective sob. Patient repeatedly stating that it is too hot in her room and fanning herself with a paper. Per chart, Dr. Mathew (PCP) has chart notes regarding possible hallucinations. Would like her home medications now but otherwise has no acute complaints.    In the ED, VS T97.7F  /78 RR 21 SpO2 94% on 4LNC. Labs significant for mild leukocytosis of 11.41, H/H 9.9/30.5, thrombophilia of 435. CO2 35, albumin 2.4.   CXR done showing b/l patchy infiltrates, official read pending  CT chest done showing multifocal PNA and reactive mediastinal lymphadenopathy  EKG read limited by artifact, sinus tachycardia with RBBB and premature supraventricular complexes (06 Oct 2020 04:55)      ---  HOSPITAL COURSE: Patient admitted for COPD exacerbation, acute respiratory failure and pneumonia. Patient was treated with home COPD medications, solumedrol, given supplemental oxygen and started on antibiotics. Pulmonology Dr Barrow was consulted and recommended continuing home medications and treatments, as well as sputum culture which showed normal respiratory richie. Blood cultures showed no growth. Pt treated with spiriva, symbicort, montelukast,  inhaler PRN  and mucinex. Serum Theophylline level normal. Infectious disease Dr. Juarez was consulted and completed course of rocephin. However, sputum cultures showed mold like fungus; patient had labwork drawn to rule out ASPA. Heme/onc was also consulted in the setting of sputum results and positive SPEP to R/o multiple myeloma. Patient was recommended repeat MGUS studies outpatient. Patient became hyperglycemic with setting of steroids and home lantus was increased from 8 to 10 units qhs as well as a moderate dose sliding scale with premeal insulin added as well was used to maintain glucose control. Pt was sinus tachy upon admission and developed paroxysms of afib. Seen by cardiologist Dr. Whitley. Dilitizem dose was increased to 240 mg po qd. Home theophylline was held due to tachycardia. Duonebs switched to Xopenex due to tachycardia. HR improved. O2 sats were monitored throughout hospital course as patient weaned down to home O2 requirements. BIPAP used intermittently as needed. Throughout stay pt's BUN/Cr increased and developed an AURORA of unknown etiology. Nephrologist Dr. Mullen was consulted. Bumex was stopped for AURORA and pt was offered albumen which she refused. Renal u/s - no hydronephrosis.  Throughout course BUN/Cr improved and patient was restarted on bumex. Patients chronic conditions were treated with home medications. Patient refused JACOBO placement and elected tot go home  Patient was medically optimized and improved clinically throughout hospital course. Patient seen and examined on day of discharge. Patient is medically stable for discharge to home with outpatient follow up.       ON DISCHARGE    T(C): 36.8 (10-24-20 @ 05:38), Max: 36.9 (10-23-20 @ 13:04)  HR: 79 (10-24-20 @ 08:53) (64 - 89)  BP: 124/79 (10-24-20 @ 05:38) (111/71 - 129/74)  RR: 20 (10-24-20 @ 05:38) (19 - 20)  SpO2: 96% (10-24-20 @ 08:53) (92% - 100%)      PHYSICAL EXAM:  GENERAL: NAD  HEENT:  anicteric, moist mucous membranes  CHEST/LUNG:  decreased breath sounds b/l,  no rales, wheezes, or rhonchi  HEART:  RRR, S1, S2  ABDOMEN:  BS+, soft, nontender, nondistended  EXTREMITIES: no edema, cyanosis, or calf tenderness  NERVOUS SYSTEM: answers questions and follows commands appropriately    ---  CONSULTANTS:   Infectious disease Dr. Juarez   Pulmonology Dr Barrow  Nephrology Dr. Mullen   ---  TIME SPENT:  I, the attending physician, was physically present for the key portions of the evaluation and management (E/M) service provided. The total amount of time spent reviewing the hospital notes, laboratory values, imaging findings, assessing/counseling the patient, discussing with consultant physicians, social work, nursing staff was -- minutes    ---  Primary care provider was made aware of plan for discharge:      [  ] NO     [  ] YES   FROM ADMISSION H+P:   HPI:  60F w/ end stage COPD on 3LNC (pending transplant list at [facility]), former smoker, CAD s/p stent (2016), afib on eliquis, uncontrolled DM2 (on insulin), raynaud phenomenon and recent hospitalization at [facility] for confusion and AURORA p/w sob. [facility] Patient states that she has had worsening shortness of breath for past two days. Unable to obtain comprehensive history due to dyspnea. Patient denies fever, chills, sore throat, cough, chest pain, palpitations, abd pain, n/v. Currently feels short of breath even after receiving duonebs and steroids in the ED. Unable to keep mask on during interview and lay back in stretcher due to subjective sob. Patient repeatedly stating that it is too hot in her room and fanning herself with a paper. Per chart, Dr. Mathew (PCP) has chart notes regarding possible hallucinations. Would like her home medications now but otherwise has no acute complaints.    In the ED, VS T97.7F  /78 RR 21 SpO2 94% on 4LNC. Labs significant for mild leukocytosis of 11.41, H/H 9.9/30.5, thrombophilia of 435. CO2 35, albumin 2.4.   CXR done showing b/l patchy infiltrates, official read pending  CT chest done showing multifocal PNA and reactive mediastinal lymphadenopathy  EKG read limited by artifact, sinus tachycardia with RBBB and premature supraventricular complexes (06 Oct 2020 04:55)      ---  HOSPITAL COURSE: Patient admitted for COPD exacerbation, acute respiratory failure and pneumonia. Patient was treated with home COPD medications, solumedrol, given supplemental oxygen and started on antibiotics. Pulmonology Dr Barrow was consulted and recommended continuing home medications and treatments, as well as sputum culture which showed normal respiratory richie. Blood cultures showed no growth. Pt treated with spiriva, symbicort, montelukast,  inhaler PRN  and mucinex. Serum Theophylline level normal. Infectious disease Dr. Juarez was consulted and completed course of rocephin. However, sputum cultures showed mold like fungus; patient had labwork drawn to rule out ASPA. Heme/onc was also consulted in the setting of sputum results and positive SPEP to R/o multiple myeloma. Patient was recommended repeat MGUS studies outpatient. Patient became hyperglycemic with setting of steroids and home lantus was increased from 8 to 10 units qhs as well as a moderate dose sliding scale with premeal insulin added as well was used to maintain glucose control. Pt was sinus tachy upon admission and developed paroxysms of afib. Seen by cardiologist Dr. Whitley. Dilitizem dose was increased to 240 mg po qd. Home theophylline was held due to tachycardia. Duonebs switched to Xopenex due to tachycardia. HR improved. O2 sats were monitored throughout hospital course as patient weaned down to home O2 requirements. BIPAP used intermittently as needed. Throughout stay pt's BUN/Cr increased and developed an AURORA of unknown etiology. Nephrologist Dr. Mullen was consulted. Bumex was stopped for AURORA and pt was offered albumen which she refused. Renal u/s - no hydronephrosis.  Throughout course BUN/Cr improved and patient was restarted on bumex. Patients chronic conditions were treated with home medications. Patient refused JACOBO placement and elected tot go home  Patient was medically optimized and improved clinically throughout hospital course. Patient seen and examined on day of discharge. Patient is medically stable for discharge to home with outpatient follow up.       ON DISCHARGE    T(C): 36.8 (10-24-20 @ 05:38), Max: 36.9 (10-23-20 @ 13:04)  HR: 79 (10-24-20 @ 08:53) (64 - 89)  BP: 124/79 (10-24-20 @ 05:38) (111/71 - 129/74)  RR: 20 (10-24-20 @ 05:38) (19 - 20)  SpO2: 96% (10-24-20 @ 08:53) (92% - 100%)      PHYSICAL EXAM:  GENERAL: NAD  HEENT:  anicteric, moist mucous membranes  CHEST/LUNG:  decreased breath sounds b/l,  no rales, wheezes, or rhonchi  HEART:  RRR, S1, S2  ABDOMEN:  BS+, soft, nontender, nondistended  EXTREMITIES: no edema, cyanosis, or calf tenderness  NERVOUS SYSTEM: answers questions and follows commands appropriately    ---  CONSULTANTS:   Infectious disease Dr. Juarez   Pulmonology Dr Barrow  Nephrology Dr. Mullen   ---  TIME SPENT: 80 minutes  I, the attending physician, was physically present for the key portions of the evaluation and management (E/M) service provided. The total amount of time spent reviewing the hospital notes, laboratory values, imaging findings, assessing/counseling the patient, discussing with consultant physicians, social work, nursing staff was 80 minutes

## 2020-10-06 NOTE — DISCHARGE NOTE PROVIDER - NSDCMRMEDTOKEN_GEN_ALL_CORE_FT
aspirin 81 mg oral tablet, chewable: 1 tab(s) orally once a day  atorvastatin 80 mg oral tablet: 1 tab(s) orally once a day  Basaglar KwikPen 100 units/mL subcutaneous solution: 12 unit(s) subcutaneous once a day (at bedtime)  bumetanide 1 mg oral tablet: 1 tab(s) orally 2 times a day  DilTIAZem (Eqv-Cardizem CD) 180 mg/24 hours oral capsule, extended release: 1 cap(s) orally once a day  Dulcolax Laxative 10 mg rectal suppository: 1 suppository(ies) rectal once a day  DuoNeb 0.5 mg-2.5 mg/3 mL inhalation solution: 3 milliliter(s) inhaled every 6 hours  Eliquis 5 mg oral tablet: 1 tab(s) orally 2 times a day  ferrous sulfate 325 mg (65 mg elemental iron) oral tablet: 1 tab(s) orally once a day  guaiFENesin 100 mg/5 mL oral liquid: 5 milliliter(s) orally every 6 hours, As Needed  lactulose 10 g/15 mL oral solution: 22.5 milliliter(s) orally 2 times a day  metFORMIN 500 mg oral tablet: 1 tab(s) orally 2 times a day  MiraLax oral powder for reconstitution: 17 gram(s) orally once a day  montelukast 10 mg oral tablet: 1 tab(s) orally once a day (at bedtime)  Multiple Vitamins oral capsule: 1 cap(s) orally once a day  oxyCODONE 5 mg oral tablet: 1 tab(s) orally every 6 hours, As Needed  pantoprazole 40 mg oral granule, delayed release: 1 each orally once a day  Reglan 5 mg oral tablet: 1 tab(s) orally 4 times a day (before meals and at bedtime)  Senna 8.6 mg oral tablet: 2 tab(s) orally once a day (at bedtime)  Spiriva 18 mcg inhalation capsule: 1 cap(s) inhaled once a day  Symbicort 160 mcg-4.5 mcg/inh inhalation aerosol: 2 puff(s) inhaled 2 times a day  theophylline 300 mg oral tablet, extended release: 1 tab(s) orally every 12 hours  Tylenol 325 mg oral tablet: 2 tab(s) orally every 6 hours, As Needed  Ventolin HFA 90 mcg/inh inhalation aerosol: 2 puff(s) inhaled every 6 hours, As Needed  Vitamin C 500 mg oral tablet: 1 tab(s) orally once a day  Vitamin D3 2000 intl units (50 mcg) oral tablet: 1 tab(s) orally once a day  Zofran ODT 4 mg oral tablet, disintegratin tab(s) orally every 8 hours, As Needed   aspirin 81 mg oral tablet, chewable: 1 tab(s) orally once a day  atorvastatin 80 mg oral tablet: 1 tab(s) orally once a day (at bedtime)  Basaglar KwikPen 100 units/mL subcutaneous solution: 12 unit(s) subcutaneous once a day (at bedtime)  bisacodyl 10 mg rectal suppository: 1 suppository(ies) rectal once a day  bumetanide 1 mg oral tablet: 1 tab(s) orally 2 times a day  DilTIAZem (Eqv-Cardizem CD) 180 mg/24 hours oral capsule, extended release: 1 cap(s) orally once a day  DuoNeb 0.5 mg-2.5 mg/3 mL inhalation solution: 3 milliliter(s) inhaled every 6 hours  Eliquis 5 mg oral tablet: 1 tab(s) orally 2 times a day  ferrous sulfate 325 mg (65 mg elemental iron) oral tablet: 1 tab(s) orally once a day  guaiFENesin 100 mg/5 mL oral liquid: 5 milliliter(s) orally every 6 hours, As Needed  lactulose 10 g/15 mL oral solution: 22.5 milliliter(s) orally 2 times a day  metFORMIN 500 mg oral tablet: 1 tab(s) orally 2 times a day  MiraLax oral powder for reconstitution: 17 gram(s) orally once a day  montelukast 10 mg oral tablet: 1 tab(s) orally once a day (at bedtime)  Multiple Vitamins oral tablet: 1 tab(s) orally once a day  oxyCODONE 5 mg oral tablet: 1 tab(s) orally every 6 hours, As Needed  pantoprazole 40 mg oral delayed release tablet: 1 tab(s) orally once a day  Reglan 5 mg oral tablet: 1 tab(s) orally 3 times a day before meals and at bedtime   Senna 8.6 mg oral tablet: 2 tab(s) orally once a day (at bedtime)  Spiriva 18 mcg inhalation capsule: 1 cap(s) inhaled once a day  Symbicort 160 mcg-4.5 mcg/inh inhalation aerosol: 2 puff(s) inhaled 2 times a day  Tylenol 325 mg oral tablet: 2 tab(s) orally every 6 hours, As Needed  Ventolin HFA 90 mcg/inh inhalation aerosol: 2 puff(s) inhaled every 6 hours, As Needed  Vitamin C 500 mg oral tablet: 1 tab(s) orally once a day  Vitamin D3 2000 intl units (50 mcg) oral tablet: 1 tab(s) orally once a day  Zofran 4 mg oral tablet: 1 tab(s) orally every 8 hours, As Needed for 7 days    aspirin 81 mg oral tablet, chewable: 1 tab(s) orally once a day  atorvastatin 80 mg oral tablet: 1 tab(s) orally once a day (at bedtime)  azithromycin 500 mg oral tablet: 1 tab(s) orally once a day  Basaglar KwikPen 100 units/mL subcutaneous solution: 12 unit(s) subcutaneous once a day (at bedtime)  bisacodyl 10 mg rectal suppository: 1 suppository(ies) rectal once a day  Cardizem  mg/24 hours oral capsule, extended release: 1 cap(s) orally once a day  DuoNeb 0.5 mg-2.5 mg/3 mL inhalation solution: 3 milliliter(s) inhaled every 6 hours  Eliquis 5 mg oral tablet: 1 tab(s) orally 2 times a day  ferrous sulfate 325 mg (65 mg elemental iron) oral tablet: 1 tab(s) orally once a day  guaiFENesin 100 mg/5 mL oral liquid: 5 milliliter(s) orally every 6 hours, As Needed  lactulose 10 g/15 mL oral solution: 22.5 milliliter(s) orally 2 times a day  metFORMIN 500 mg oral tablet: 1 tab(s) orally 2 times a day  MiraLax oral powder for reconstitution: 17 gram(s) orally once a day  montelukast 10 mg oral tablet: 1 tab(s) orally once a day (at bedtime)  Multiple Vitamins oral tablet: 1 tab(s) orally once a day  pantoprazole 40 mg oral delayed release tablet: 1 tab(s) orally once a day  Senna 8.6 mg oral tablet: 2 tab(s) orally once a day (at bedtime)  Spiriva 18 mcg inhalation capsule: 1 cap(s) inhaled once a day  Symbicort 160 mcg-4.5 mcg/inh inhalation aerosol: 2 puff(s) inhaled 2 times a day  Tylenol 325 mg oral tablet: 2 tab(s) orally every 6 hours, As Needed  Vitamin D3 2000 intl units (50 mcg) oral tablet: 1 tab(s) orally once a day   aspirin 81 mg oral tablet, chewable: 1 tab(s) orally once a day  atorvastatin 80 mg oral tablet: 1 tab(s) orally once a day (at bedtime)  azithromycin 500 mg oral tablet: 1 tab(s) orally once a day  Basaglar KwikPen 100 units/mL subcutaneous solution: 12 unit(s) subcutaneous once a day (at bedtime)  bisacodyl 10 mg rectal suppository: 1 suppository(ies) rectal once a day  Cardizem  mg/24 hours oral capsule, extended release: 1 cap(s) orally once a day  DuoNeb 0.5 mg-2.5 mg/3 mL inhalation solution: 3 milliliter(s) inhaled every 6 hours  Eliquis 5 mg oral tablet: 1 tab(s) orally 2 times a day  ferrous sulfate 325 mg (65 mg elemental iron) oral tablet: 1 tab(s) orally once a day  guaiFENesin 100 mg/5 mL oral liquid: 5 milliliter(s) orally every 6 hours, As Needed  ipratropium-albuterol 0.5 mg-2.5 mg/3 mLinhalation solution: 1.5 milliliter(s) by nebulizer every 4 hours, As Needed     ICD J44.9    lactulose 10 g/15 mL oral solution: 22.5 milliliter(s) orally 2 times a day  metFORMIN 500 mg oral tablet: 1 tab(s) orally 2 times a day  MiraLax oral powder for reconstitution: 17 gram(s) orally once a day  montelukast 10 mg oral tablet: 1 tab(s) orally once a day (at bedtime)  Multiple Vitamins oral tablet: 1 tab(s) orally once a day  pantoprazole 40 mg oral delayed release tablet: 1 tab(s) orally once a day  predniSONE 5 mg oral tablet: 4 tabs orally once a day for two days (10/25-10/26), 2 tabs once a day for 7 days (10/27-11/2), 1 tab once a day for 7 days (11/3-11/9)  Senna 8.6 mg oral tablet: 2 tab(s) orally once a day (at bedtime)  Spiriva 18 mcg inhalation capsule: 1 cap(s) inhaled once a day  Symbicort 160 mcg-4.5 mcg/inh inhalation aerosol: 2 puff(s) inhaled 2 times a day  Tylenol 325 mg oral tablet: 2 tab(s) orally every 6 hours, As Needed  Vitamin D3 2000 intl units (50 mcg) oral tablet: 1 tab(s) orally once a day   aspirin 81 mg oral tablet, chewable: 1 tab(s) orally once a day  atorvastatin 80 mg oral tablet: 1 tab(s) orally once a day (at bedtime)  azithromycin 500 mg oral tablet: 1 tab(s) orally once a day  Basaglar KwikPen 100 units/mL subcutaneous solution: 12 unit(s) subcutaneous once a day (at bedtime)  bisacodyl 10 mg rectal suppository: 1 suppository(ies) rectal once a day  Cardizem  mg/24 hours oral capsule, extended release: 1 cap(s) orally once a day  Eliquis 5 mg oral tablet: 1 tab(s) orally 2 times a day  ferrous sulfate 325 mg (65 mg elemental iron) oral tablet: 1 tab(s) orally once a day  guaiFENesin 100 mg/5 mL oral liquid: 5 milliliter(s) orally every 6 hours, As Needed  ipratropium-albuterol 0.5 mg-2.5 mg/3 mLinhalation solution: 1.5 milliliter(s) by nebulizer every 4 hours, As Needed     ICD J44.9    lactulose 10 g/15 mL oral solution: 22.5 milliliter(s) orally 2 times a day  metFORMIN 500 mg oral tablet: 1 tab(s) orally 2 times a day  MiraLax oral powder for reconstitution: 17 gram(s) orally once a day  montelukast 10 mg oral tablet: 1 tab(s) orally once a day (at bedtime)  Multiple Vitamins oral tablet: 1 tab(s) orally once a day  pantoprazole 40 mg oral delayed release tablet: 1 tab(s) orally once a day  predniSONE 5 mg oral tablet: 4 tabs orally once a day for two days (10/25-10/26), 2 tabs once a day for 7 days (10/27-11/2), 1 tab once a day for 7 days (11/3-11/9)  Senna 8.6 mg oral tablet: 2 tab(s) orally once a day (at bedtime)  Spiriva 18 mcg inhalation capsule: 1 cap(s) inhaled once a day  Symbicort 160 mcg-4.5 mcg/inh inhalation aerosol: 2 puff(s) inhaled 2 times a day  Tessalon Perles 100 mg oral capsule: 1 cap(s) orally 3 times a day, As Needed -for cough   Tylenol 325 mg oral tablet: 2 tab(s) orally every 6 hours, As Needed  Vitamin D3 2000 intl units (50 mcg) oral tablet: 1 tab(s) orally once a day

## 2020-10-06 NOTE — H&P ADULT - HISTORY OF PRESENT ILLNESS
60F w/ end stage COPD on 3LNC (pending transplant list at [facility]), former smoker, CAD s/p stent (2016), afib on eliquis, uncontrolled DM2, raynaud phenomenon and recent hospitalization at [facility] for altered mental status and AURORA presents to [facility] for evaluation of generalized weakness and difficulty walking. Patient reports that she was recently hospitalized at [facility] from 7/25-7/31 for confusion and AURORA. At that time, she was told by her brother (lives downstairs in home) that she was not herself and did not recognize him which is why she was sent to hospital. Per chart, Dr. Mathew (PCP) has chart notes regarding possible hallucinations.       In the ED, VS 97.5, 138/76, 69, 18 98%3LNC 60F w/ end stage COPD on 3LNC (pending transplant list at [facility]), former smoker, CAD s/p stent (2016), afib on eliquis, uncontrolled DM2 (on insulin), raynaud phenomenon and recent hospitalization at [facility] for confusion and AURORA p/w sob. Sent from [facility]. Patient states that she has had worsening shortness of breath for past two days. Unable to obtain comprehensive history due to dyspnea. Patient denies fever, chills, sore throat, cough, chest pain, palpitations, abd pain, n/v. Currently feels short of breath even after receiving duonebs and steroids in the ED. Unable to keep mask on during interview and lay back in stretcher due to subjective sob. Patient repeatedly stating that it is too hot in her room and fanning herself with a paper. Per chart, Dr. Mathew (PCP) has chart notes regarding possible hallucinations. Would like her home medications now but otherwise has no acute complaints.    In the ED, VS T97.7F  /78 RR 21 SpO2 94% on 4LNC. Labs significant for mild leukocytosis of 11.41, H/H 9.9/30.5, thrombophilia of 435. CO2 35, albumin 2.4.   CXR done showing b/l patchy infiltrates, official read pending  CT chest done showing multifocal PNA and reactive mediastinal lymphadenopathy  EKG read limited by artifact, sinus tachycardia with RBBB and premature supraventricular complexes

## 2020-10-06 NOTE — ED ADULT NURSE NOTE - NSIMPLEMENTINTERV_GEN_ALL_ED
Implemented All Universal Safety Interventions:  [place] to call system. Call bell, personal items and telephone within reach. Instruct patient to call for assistance. Room bathroom lighting operational. Non-slip footwear when patient is off stretcher. Physically safe environment: no spills, clutter or unnecessary equipment. Stretcher in lowest position, wheels locked, appropriate side rails in place.

## 2020-10-07 DIAGNOSIS — J96.21 ACUTE AND CHRONIC RESPIRATORY FAILURE WITH HYPOXIA: ICD-10-CM

## 2020-10-07 DIAGNOSIS — J18.9 PNEUMONIA, UNSPECIFIED ORGANISM: ICD-10-CM

## 2020-10-07 DIAGNOSIS — J44.9 CHRONIC OBSTRUCTIVE PULMONARY DISEASE, UNSPECIFIED: ICD-10-CM

## 2020-10-07 DIAGNOSIS — N17.9 ACUTE KIDNEY FAILURE, UNSPECIFIED: ICD-10-CM

## 2020-10-07 LAB
A1C WITH ESTIMATED AVERAGE GLUCOSE RESULT: 6 % — HIGH (ref 4–5.6)
ALBUMIN SERPL ELPH-MCNC: 2.6 G/DL — LOW (ref 3.3–5)
ALP SERPL-CCNC: 88 U/L — SIGNIFICANT CHANGE UP (ref 40–120)
ALT FLD-CCNC: 13 U/L — SIGNIFICANT CHANGE UP (ref 12–78)
ANION GAP SERPL CALC-SCNC: 9 MMOL/L — SIGNIFICANT CHANGE UP (ref 5–17)
AST SERPL-CCNC: 19 U/L — SIGNIFICANT CHANGE UP (ref 15–37)
BASE EXCESS BLDV CALC-SCNC: 9.5 MMOL/L — HIGH (ref -2–2)
BASOPHILS # BLD AUTO: 0.01 K/UL — SIGNIFICANT CHANGE UP (ref 0–0.2)
BASOPHILS NFR BLD AUTO: 0.1 % — SIGNIFICANT CHANGE UP (ref 0–2)
BILIRUB SERPL-MCNC: 0.3 MG/DL — SIGNIFICANT CHANGE UP (ref 0.2–1.2)
BLOOD GAS COMMENTS, VENOUS: SIGNIFICANT CHANGE UP
BUN SERPL-MCNC: 32 MG/DL — HIGH (ref 7–23)
CALCIUM SERPL-MCNC: 9.6 MG/DL — SIGNIFICANT CHANGE UP (ref 8.5–10.1)
CHLORIDE SERPL-SCNC: 86 MMOL/L — LOW (ref 96–108)
CO2 SERPL-SCNC: 36 MMOL/L — HIGH (ref 22–31)
CREAT SERPL-MCNC: 1.4 MG/DL — HIGH (ref 0.5–1.3)
EOSINOPHIL # BLD AUTO: 0 K/UL — SIGNIFICANT CHANGE UP (ref 0–0.5)
EOSINOPHIL NFR BLD AUTO: 0 % — SIGNIFICANT CHANGE UP (ref 0–6)
ESTIMATED AVERAGE GLUCOSE: 126 MG/DL — HIGH (ref 68–114)
GLUCOSE SERPL-MCNC: 230 MG/DL — HIGH (ref 70–99)
HCO3 BLDV-SCNC: 31 MMOL/L — HIGH (ref 21–29)
HCT VFR BLD CALC: 33 % — LOW (ref 34.5–45)
HCV AB S/CO SERPL IA: 0.09 S/CO — SIGNIFICANT CHANGE UP (ref 0–0.99)
HCV AB SERPL-IMP: SIGNIFICANT CHANGE UP
HGB BLD-MCNC: 10.3 G/DL — LOW (ref 11.5–15.5)
HOROWITZ INDEX BLDV+IHG-RTO: 21 — SIGNIFICANT CHANGE UP
IMM GRANULOCYTES NFR BLD AUTO: 0.6 % — SIGNIFICANT CHANGE UP (ref 0–1.5)
LYMPHOCYTES # BLD AUTO: 0.52 K/UL — LOW (ref 1–3.3)
LYMPHOCYTES # BLD AUTO: 5.9 % — LOW (ref 13–44)
MAGNESIUM SERPL-MCNC: 1.5 MG/DL — LOW (ref 1.6–2.6)
MCHC RBC-ENTMCNC: 23.8 PG — LOW (ref 27–34)
MCHC RBC-ENTMCNC: 31.2 GM/DL — LOW (ref 32–36)
MCV RBC AUTO: 76.4 FL — LOW (ref 80–100)
MONOCYTES # BLD AUTO: 0.36 K/UL — SIGNIFICANT CHANGE UP (ref 0–0.9)
MONOCYTES NFR BLD AUTO: 4.1 % — SIGNIFICANT CHANGE UP (ref 2–14)
NEUTROPHILS # BLD AUTO: 7.93 K/UL — HIGH (ref 1.8–7.4)
NEUTROPHILS NFR BLD AUTO: 89.3 % — HIGH (ref 43–77)
NRBC # BLD: 0 /100 WBCS — SIGNIFICANT CHANGE UP (ref 0–0)
PCO2 BLDV: 68 MMHG — HIGH (ref 35–50)
PH BLDV: 7.34 — LOW (ref 7.35–7.45)
PHOSPHATE SERPL-MCNC: 4.7 MG/DL — HIGH (ref 2.5–4.5)
PLATELET # BLD AUTO: 513 K/UL — HIGH (ref 150–400)
PO2 BLDV: >40 MMHG — SIGNIFICANT CHANGE UP (ref 25–45)
POTASSIUM SERPL-MCNC: 3.6 MMOL/L — SIGNIFICANT CHANGE UP (ref 3.5–5.3)
POTASSIUM SERPL-SCNC: 3.6 MMOL/L — SIGNIFICANT CHANGE UP (ref 3.5–5.3)
PROT SERPL-MCNC: 7.8 G/DL — SIGNIFICANT CHANGE UP (ref 6–8.3)
RBC # BLD: 4.32 M/UL — SIGNIFICANT CHANGE UP (ref 3.8–5.2)
RBC # FLD: 15.9 % — HIGH (ref 10.3–14.5)
SAO2 % BLDV: 43 % — LOW (ref 67–88)
SODIUM SERPL-SCNC: 131 MMOL/L — LOW (ref 135–145)
TSH SERPL-MCNC: 0.37 UIU/ML — SIGNIFICANT CHANGE UP (ref 0.36–3.74)
WBC # BLD: 8.87 K/UL — SIGNIFICANT CHANGE UP (ref 3.8–10.5)
WBC # FLD AUTO: 8.87 K/UL — SIGNIFICANT CHANGE UP (ref 3.8–10.5)

## 2020-10-07 PROCEDURE — 99232 SBSQ HOSP IP/OBS MODERATE 35: CPT

## 2020-10-07 PROCEDURE — 99233 SBSQ HOSP IP/OBS HIGH 50: CPT | Mod: GC

## 2020-10-07 PROCEDURE — 99223 1ST HOSP IP/OBS HIGH 75: CPT

## 2020-10-07 RX ORDER — CEFEPIME 1 G/1
2000 INJECTION, POWDER, FOR SOLUTION INTRAMUSCULAR; INTRAVENOUS EVERY 12 HOURS
Refills: 0 | Status: DISCONTINUED | OUTPATIENT
Start: 2020-10-07 | End: 2020-10-09

## 2020-10-07 RX ORDER — THEOPHYLLINE ANHYDROUS 200 MG
100 TABLET, EXTENDED RELEASE 12 HR ORAL ONCE
Refills: 0 | Status: COMPLETED | OUTPATIENT
Start: 2020-10-07 | End: 2020-10-07

## 2020-10-07 RX ORDER — LEVALBUTEROL 1.25 MG/.5ML
0.63 SOLUTION, CONCENTRATE RESPIRATORY (INHALATION) EVERY 4 HOURS
Refills: 0 | Status: DISCONTINUED | OUTPATIENT
Start: 2020-10-07 | End: 2020-10-09

## 2020-10-07 RX ORDER — MAGNESIUM SULFATE 500 MG/ML
1 VIAL (ML) INJECTION ONCE
Refills: 0 | Status: COMPLETED | OUTPATIENT
Start: 2020-10-07 | End: 2020-10-07

## 2020-10-07 RX ORDER — ONDANSETRON 8 MG/1
4 TABLET, FILM COATED ORAL THREE TIMES A DAY
Refills: 0 | Status: DISCONTINUED | OUTPATIENT
Start: 2020-10-07 | End: 2020-10-08

## 2020-10-07 RX ADMIN — Medication 300 MILLIGRAM(S): at 10:11

## 2020-10-07 RX ADMIN — Medication 10: at 12:57

## 2020-10-07 RX ADMIN — POLYETHYLENE GLYCOL 3350 17 GRAM(S): 17 POWDER, FOR SOLUTION ORAL at 12:56

## 2020-10-07 RX ADMIN — Medication 1000 UNIT(S): at 12:54

## 2020-10-07 RX ADMIN — LEVALBUTEROL 0.63 MILLIGRAM(S): 1.25 SOLUTION, CONCENTRATE RESPIRATORY (INHALATION) at 20:14

## 2020-10-07 RX ADMIN — CEFEPIME 100 MILLIGRAM(S): 1 INJECTION, POWDER, FOR SOLUTION INTRAMUSCULAR; INTRAVENOUS at 17:34

## 2020-10-07 RX ADMIN — Medication 100 GRAM(S): at 10:10

## 2020-10-07 RX ADMIN — Medication 81 MILLIGRAM(S): at 12:55

## 2020-10-07 RX ADMIN — INSULIN GLARGINE 10 UNIT(S): 100 INJECTION, SOLUTION SUBCUTANEOUS at 21:42

## 2020-10-07 RX ADMIN — Medication 6: at 17:34

## 2020-10-07 RX ADMIN — SENNA PLUS 2 TABLET(S): 8.6 TABLET ORAL at 21:37

## 2020-10-07 RX ADMIN — Medication 40 MILLIGRAM(S): at 06:24

## 2020-10-07 RX ADMIN — Medication 325 MILLIGRAM(S): at 12:53

## 2020-10-07 RX ADMIN — BUDESONIDE AND FORMOTEROL FUMARATE DIHYDRATE 2 PUFF(S): 160; 4.5 AEROSOL RESPIRATORY (INHALATION) at 17:47

## 2020-10-07 RX ADMIN — PANTOPRAZOLE SODIUM 40 MILLIGRAM(S): 20 TABLET, DELAYED RELEASE ORAL at 06:24

## 2020-10-07 RX ADMIN — AZITHROMYCIN 255 MILLIGRAM(S): 500 TABLET, FILM COATED ORAL at 13:54

## 2020-10-07 RX ADMIN — APIXABAN 5 MILLIGRAM(S): 2.5 TABLET, FILM COATED ORAL at 06:24

## 2020-10-07 RX ADMIN — BUMETANIDE 1 MILLIGRAM(S): 0.25 INJECTION INTRAMUSCULAR; INTRAVENOUS at 06:24

## 2020-10-07 RX ADMIN — ONDANSETRON 4 MILLIGRAM(S): 8 TABLET, FILM COATED ORAL at 16:38

## 2020-10-07 RX ADMIN — Medication 180 MILLIGRAM(S): at 06:24

## 2020-10-07 RX ADMIN — Medication 500 MILLIGRAM(S): at 12:52

## 2020-10-07 RX ADMIN — APIXABAN 5 MILLIGRAM(S): 2.5 TABLET, FILM COATED ORAL at 17:34

## 2020-10-07 RX ADMIN — Medication 3 MILLILITER(S): at 10:19

## 2020-10-07 RX ADMIN — Medication 1 TABLET(S): at 12:54

## 2020-10-07 RX ADMIN — PIPERACILLIN AND TAZOBACTAM 25 GRAM(S): 4; .5 INJECTION, POWDER, LYOPHILIZED, FOR SOLUTION INTRAVENOUS at 06:24

## 2020-10-07 RX ADMIN — ATORVASTATIN CALCIUM 80 MILLIGRAM(S): 80 TABLET, FILM COATED ORAL at 21:40

## 2020-10-07 RX ADMIN — Medication 6: at 08:41

## 2020-10-07 RX ADMIN — MONTELUKAST 10 MILLIGRAM(S): 4 TABLET, CHEWABLE ORAL at 21:37

## 2020-10-07 RX ADMIN — Medication 100 MILLIGRAM(S): at 12:52

## 2020-10-07 RX ADMIN — TIOTROPIUM BROMIDE 1 CAPSULE(S): 18 CAPSULE ORAL; RESPIRATORY (INHALATION) at 21:37

## 2020-10-07 RX ADMIN — Medication 3 MILLILITER(S): at 05:41

## 2020-10-07 RX ADMIN — BUDESONIDE AND FORMOTEROL FUMARATE DIHYDRATE 2 PUFF(S): 160; 4.5 AEROSOL RESPIRATORY (INHALATION) at 10:11

## 2020-10-07 NOTE — PROGRESS NOTE ADULT - SUBJECTIVE AND OBJECTIVE BOX
[facility]  INFECTIOUS DISEASES   =======================================================    [facility]-[id number]  CHERI HARTMAN     Follow up: COPD exacerbation, Pneumonia    Still has shortness of breath. Some cough and sputum but not heavy.   Worsening of NIELSON with any movement. Has BIPAP at night time.     PAST MEDICAL & SURGICAL HISTORY:  H/O Raynaud&#x27;s syndrome  Ascorbic acid deficiency  Iron deficiency anemia  Constipation  GERD without esophagitis  Type 2 diabetes mellitus  HTN (hypertension)  Heart failure  HLD (hyperlipidemia)  History of chronic atrial fibrillation  on Eliquis  End stage COPD  on 3LNC  History of tonsillectomy    Social Hx: former heavy smoker, no ETOH or drugs     FAMILY HISTORY:  FH: myocardial infarction    Family history of CABG    Allergies  No Known Allergies    Antibiotics:  Azithromycin   Ceftriaxone      REVIEW OF SYSTEMS:  CONSTITUTIONAL:  No Fever or chills  HEENT:  No diplopia or blurred vision.  No sore throat or runny nose.  CARDIOVASCULAR:  No chest pain or SOB.  RESPIRATORY:  No cough, severe SOB  GASTROINTESTINAL:  No nausea, vomiting or diarrhea.  GENITOURINARY:  No dysuria, frequency or urgency. No Blood in urine  MUSCULOSKELETAL:  no joint aches, no muscle pain  SKIN:  No change in skin, hair or nails.  NEUROLOGIC:  No paresthesias, fasciculations, seizures or weakness.  PSYCHIATRIC:  No disorder of thought or mood.  ENDOCRINE:  No heat or cold intolerance, polyuria or polydipsia.  HEMATOLOGICAL:  No easy bruising or bleeding.     Physical Exam:  Vital Signs Last 24 Hrs  T(C): 36.4 (07 Oct 2020 06:29), Max: 36.7 (06 Oct 2020 12:34)  T(F): 97.5 (07 Oct 2020 06:29), Max: 98.1 (06 Oct 2020 12:34)  HR: 89 (07 Oct 2020 10:27) (54 - 100)  BP: 110/68 (07 Oct 2020 06:29) (110/68 - 135/74)  BP(mean): --  RR: 18 (07 Oct 2020 06:29) (18 - 22)  SpO2: 97% (07 Oct 2020 10:27) (90% - 98%)  GEN: NAD  HEENT: normocephalic and atraumatic. EOMI. PERRL.    NECK: Supple.  No lymphadenopathy   LUNGS: very poor air movement but Clear to auscultation with no wheezing or rales .  HEART: Regular rate and rhythm   ABDOMEN: Soft, nontender, and nondistended.  Positive bowel sounds.    : No CVA tenderness  EXTREMITIES: Without any cyanosis, clubbing, rash, lesions or edema.  NEUROLOGIC: grossly intact.  PSYCHIATRIC: Appropriate affect .  SKIN: No ulceration or induration present.    Labs:   10-07    131<L>  |  86<L>  |  32<H>  ----------------------------<  230<H>  3.6   |  36<H>  |  1.40<H>    Ca    9.6      07 Oct 2020 06:55  Phos  4.7     10-07  Mg     1.5     10-07    TPro  7.8  /  Alb  2.6<L>  /  TBili  0.3  /  DBili  x   /  AST  19  /  ALT  13  /  AlkPhos  88  10-07                        10.3   8.87  )-----------( 513      ( 07 Oct 2020 06:55 )             33.0     LIVER FUNCTIONS - ( 07 Oct 2020 06:55 )  Alb: 2.6 g/dL / Pro: 7.8 g/dL / ALK PHOS: 88 U/L / ALT: 13 U/L / AST: 19 U/L / GGT: x           RECENT CULTURES:  10-06 @ 09:23 .Blood Blood     No growth to date.    All imaging and other data have been reviewed.  < from: CT Chest No Cont (10.06.20 @ 04:52) >  IMPRESSION:  Multifocal pneumonia involving both lungs as described.  Reactive mediastinal lymphadenopathy.    Assessment and Plan:   61y/o woman with end stage COPD on 3L O2 at home awaiting transplant at [facility], former smoker, CAD s/p stent, afib, DM2), raynaud phenomenon and recent hospitalization at [facility] for confusion and AURORA has been sent from [facility] with worsening of SOB. CT with multilobar opacities. Due to recent hospitalization and rehab stay, will cover for possible HCAP.   Very high risk with a poor lung capacities.     COPD, Pneumonia  - Blood culture NGTD  - Sputum culture pending collection   - Legionella U ag   - CXR and CT with multilobar opacities   - Respiratory and pulmonary consults noted, regular nebulizer treatment and inhalers.   - TTE, rule out cardiac component  - Can stop zosyn not suspecting anaerobes.   - Will switch to cefepime 2gm q12 (creat 1.4)  - Continue azithromycin 500mg daily    Will follow.    Wagner Juarez MD  Division of Infectious Diseases   Cell [phone] between 7am and 5pm   After 5pm and weekends please call ID service at [phone].

## 2020-10-07 NOTE — CONSULT NOTE ADULT - SUBJECTIVE AND OBJECTIVE BOX
[facility]         Adriana Gonzales, Gina, Funmilayo, Gutierrez, Dimitrios Richardson        [phone] (office)  INCOMPLETE  Reason for Consult:    Interval HPI: Patient seen and examined at bedside. No acute events overnight.     HPI:  60F w/ end stage COPD on 3LNC (pending transplant list at [facility]), former smoker, CAD s/p stent (2016), afib on eliquis, uncontrolled DM2 (on insulin), raynaud phenomenon and recent hospitalization at [facility] for confusion and [facility] p[facility] sob. [facility] Patient states that she has had worsening shortness of breath for past two days. Unable to obtain comprehensive history due to dyspnea. Patient denies fever, chills, sore throat, cough, chest pain, palpitations, abd pain, n/v. Currently feels short of breath even after receiving duonebs and steroids in the ED. Unable to keep mask on during interview and lay back in stretcher due to subjective sob. Patient repeatedly stating that it is too hot in her room and fanning herself with a paper. Per chart, Dr. Mathew (PCP) has chart notes regarding possible hallucinations. Would like her home medications now but otherwise has no acute complaints.    In the ED, VS T97.7F  /78 RR 21 SpO2 94% on 4LNC. Labs significant for mild leukocytosis of 11.41, H/H 9.9/30.5, thrombophilia of 435. CO2 35, albumin 2.4.   CXR done showing b/l patchy infiltrates, official read pending  CT chest done showing multifocal PNA and reactive mediastinal lymphadenopathy  EKG read limited by artifact, sinus tachycardia with RBBB and premature supraventricular complexes (06 Oct 2020 04:55)    ECHO: 10/7/2020  Technically difficult and limited study  Mild concentric LVH with grossly normal left ventricular systolic function, estimated LVEF of 55%.  Grossly normal RV size and systolic function.  Aortic valve is not well-visualized, grossly normal function without severe pathology  Mild MR  Trace TR.  No significant pericardial effusion.      PAST MEDICAL & SURGICAL HISTORY:  H/O Raynaud&#x27;s syndrome    Ascorbic acid deficiency    Iron deficiency anemia    Constipation    GERD without esophagitis    Type 2 diabetes mellitus    HTN (hypertension)    Heart failure    HLD (hyperlipidemia)    History of chronic atrial fibrillation  on Eliquis    End stage COPD  on 3LNC    Atrial fibrillation    Hyperlipemia    Chronic sinusitis    Raynaud phenomenon    HTN (hypertension)    DM (diabetes mellitus)    Claustrophobia    COPD (chronic obstructive pulmonary disease)    History of tonsillectomy    S/P tonsillectomy        SOCIAL HISTORY: No active tobacco, alcohol or illicit drug use    FAMILY HISTORY:  FH: myocardial infarction    Family history of CABG    FH: MI (myocardial infarction)    FH: CABG (coronary artery bypass surgery)        Home Medications:  apixaban 5 mg oral tablet: 1 tab(s) orally every 12 hours (01 Aug 2020 21:52)  ascorbic acid 500 mg oral tablet: 1 tab(s) orally once a day (14 Sep 2020 12:58)  aspirin 81 mg oral delayed release tablet: 1 tab(s) orally once a day (01 Aug 2020 21:52)  aspirin 81 mg oral tablet, chewable: 1 tab(s) orally once a day (07 Oct 2020 10:02)  atorvastatin 80 mg oral tablet: 1 tab(s) orally once a day (at bedtime) (14 Sep 2020 12:58)  atorvastatin 80 mg oral tablet: 1 tab(s) orally once a day (07 Oct 2020 10:02)  Basaglar KwikPen 100 units/mL subcutaneous solution: 12 unit(s) subcutaneous once a day (at bedtime) (07 Oct 2020 10:02)  bisacodyl 10 mg rectal suppository: 1 suppository(ies) rectal once a day (14 Sep 2020 12:58)  bumetanide 1 mg oral tablet: 1 tab(s) orally 2 times a day (07 Oct 2020 10:02)  bumetanide 2 mg oral tablet: 1 tab(s) orally every 12 hours (14 Sep 2020 12:58)  DilTIAZem (Eqv-Cardizem CD) 180 mg/24 hours oral capsule, extended release: 1 cap(s) orally once a day (07 Oct 2020 10:02)  Dulcolax Laxative 10 mg rectal suppository: 1 suppository(ies) rectal once a day (07 Oct 2020 10:)  DuoNeb 0.5 mg-2.5 mg/3 mL inhalation solution: 3 milliliter(s) inhaled every 6 hours (07 Oct 2020 10:02)  Eliquis 5 mg oral tablet: 1 tab(s) orally 2 times a day (07 Oct 2020 10:02)  ferrous sulfate 325 mg (65 mg elemental iron) oral tablet: 1 tab(s) orally 2 times a day (14 Sep 2020 12:58)  ferrous sulfate 325 mg (65 mg elemental iron) oral tablet: 1 tab(s) orally once a day (07 Oct 2020 10:)  guaiFENesin 100 mg/5 mL oral liquid: 5 milliliter(s) orally every 6 hours, As needed, Cough (14 Sep 2020 12:58)  guaiFENesin 100 mg/5 mL oral liquid: 5 milliliter(s) orally every 6 hours, As Needed (07 Oct 2020 10:)  insulin glargine: 12 unit(s) subcutaneous once a day (at bedtime) (14 Sep 2020 12:58)  lactulose 10 g/15 mL oral solution: 22.5 milliliter(s) orally 2 times a day (07 Oct 2020 10:)  lactulose 10 g/15 mL oral syrup: 22.5 milliliter(s) orally 2 times a day (14 Sep 2020 12:58)  metFORMIN 500 mg oral tablet: 1 tab(s) orally 2 times a day (14 Sep 2020 12:58)  metFORMIN 500 mg oral tablet: 1 tab(s) orally 2 times a day (07 Oct 2020 10:)  MiraLax oral powder for reconstitution: 17 gram(s) orally once a day (07 Oct 2020 10:)  montelukast 10 mg oral tablet: 1 tab(s) orally once a day (at bedtime) (07 Oct 2020 10:)  Multiple Vitamins oral capsule: 1 cap(s) orally once a day (07 Oct 2020 10:)  Multiple Vitamins oral tablet: 1 tab(s) orally once a day (01 Aug 2020 21:52)  mupirocin 2% topical ointment: 1 application topically  (14 Sep 2020 12:58)  oxyCODONE 5 mg oral tablet: 1 tab(s) orally every 6 hours, As needed, Severe Pain (7 - 10) (14 Sep 2020 12:)  oxyCODONE 5 mg oral tablet: 1 tab(s) orally every 6 hours, As Needed (07 Oct 2020 10:)  pantoprazole 40 mg oral delayed release tablet: 1 tab(s) orally once a day (before a meal) (01 Aug 2020 21:52)  pantoprazole 40 mg oral granule, delayed release: 1 each orally once a day (07 Oct 2020 10:)  polyethylene glycol 3350 oral powder for reconstitution: 17 gram(s) orally once a day (14 Sep 2020 12:58)  Reglan 5 mg oral tablet: 1 tab(s) orally 4 times a day (before meals and at bedtime) (07 Oct 2020 10:)  Senna 8.6 mg oral tablet: 2 tab(s) orally once a day (at bedtime) (07 Oct 2020 10:)  senna oral tablet: 2 tab(s) orally once a day (at bedtime) (14 Sep 2020 12:58)  Singulair 10 mg oral tablet: 1 tab(s) orally once a day (in the evening) (01 Aug 2020 21:52)  Spiriva 18 mcg inhalation capsule: 1 cap(s) inhaled once a day (01 Aug 2020 21:)  Spiriva 18 mcg inhalation capsule: 1 cap(s) inhaled once a day (07 Oct 2020 10:)  Symbicort 160 mcg-4.5 mcg/inh inhalation aerosol: 2 puff(s) inhaled 2 times a day (01 Aug 2020 21:52)  Symbicort 160 mcg-4.5 mcg/inh inhalation aerosol: 2 puff(s) inhaled 2 times a day (07 Oct 2020 10:)  theophylline 300 mg oral tablet, extended release: 1 tab(s) orally every 12 hours (07 Oct 2020 10:)  theophylline 400 mg/24 hours oral tablet, extended release: 1 tab(s) orally once a day (01 Aug 2020 21:52)  Tylenol 325 mg oral tablet: 2 tab(s) orally every 6 hours, As Needed (07 Oct 2020 10:)  Ventolin HFA 90 mcg/inh inhalation aerosol: 2 puff(s) inhaled every 6 hours, As Needed (07 Oct 2020 10:)  Ventolin HFA 90 mcg/inh inhalation aerosol: 2 puff(s) inhaled 2 times a day (01 Aug 2020 21:52)  Vitamin C 500 mg oral tablet: 1 tab(s) orally once a day (07 Oct 2020 10:)  Vitamin D3 2000 intl units (50 mcg) oral tablet: 1 tab(s) orally once a day (07 Oct 2020 10:)  Vitamin D3 2000 intl units oral tablet: 1 tab(s) orally once a day (01 Aug 2020 21:52)  Zofran ODT 4 mg oral tablet, disintegratin tab(s) orally every 8 hours, As Needed (07 Oct 2020 10:02)      MEDICATIONS  (STANDING):  apixaban 5 milliGRAM(s) Oral every 12 hours  ascorbic acid 500 milliGRAM(s) Oral daily  aspirin  chewable 81 milliGRAM(s) Oral daily  atorvastatin 80 milliGRAM(s) Oral at bedtime  azithromycin  IVPB 500 milliGRAM(s) IV Intermittent every 24 hours  budesonide 160 MICROgram(s)/formoterol 4.5 MICROgram(s) Inhaler 2 Puff(s) Inhalation two times a day  buMETAnide 1 milliGRAM(s) Oral two times a day  cholecalciferol 1000 Unit(s) Oral daily  dextrose 5%. 1000 milliLiter(s) (50 mL/Hr) IV Continuous <Continuous>  dextrose 50% Injectable 12.5 Gram(s) IV Push once  dextrose 50% Injectable 25 Gram(s) IV Push once  dextrose 50% Injectable 25 Gram(s) IV Push once  diltiazem    milliGRAM(s) Oral daily  ferrous    sulfate 325 milliGRAM(s) Oral daily  insulin glargine Injectable (LANTUS) 10 Unit(s) SubCutaneous at bedtime  insulin lispro (HumaLOG) corrective regimen sliding scale   SubCutaneous three times a day before meals  methylPREDNISolone sodium succinate Injectable 40 milliGRAM(s) IV Push daily  montelukast 10 milliGRAM(s) Oral at bedtime  multivitamin 1 Tablet(s) Oral daily  pantoprazole    Tablet 40 milliGRAM(s) Oral before breakfast  piperacillin/tazobactam IVPB.. 3.375 Gram(s) IV Intermittent every 8 hours  polyethylene glycol 3350 17 Gram(s) Oral daily  senna 2 Tablet(s) Oral at bedtime  theophylline ER Capsule 100 milliGRAM(s) Oral once  tiotropium 18 MICROgram(s) Capsule 1 Capsule(s) Inhalation daily    MEDICATIONS  (PRN):  acetaminophen   Tablet .. 650 milliGRAM(s) Oral every 6 hours PRN Mild Pain (1 - 3)  albuterol/ipratropium for Nebulization 3 milliLiter(s) Nebulizer every 3 hours PRN Shortness of Breath and/or Wheezing  bisacodyl Suppository 10 milliGRAM(s) Rectal daily PRN Constipation  dextrose 40% Gel 15 Gram(s) Oral once PRN Blood Glucose LESS THAN 70 milliGRAM(s)/deciliter  glucagon  Injectable 1 milliGRAM(s) IntraMuscular once PRN Glucose LESS THAN 70 milligrams/deciliter  guaiFENesin   Syrup  (Sugar-Free) 100 milliGRAM(s) Oral every 6 hours PRN Cough  lactulose Syrup 15 Gram(s) Oral two times a day PRN constipation  oxyCODONE    IR 5 milliGRAM(s) Oral every 6 hours PRN Moderate Pain (4 - 6)      Allergies    No Known Allergies    Intolerances    albuterol (Unknown)      REVIEW OF SYSTEMS: Negative except as per HPI.    VITAL SIGNS:   Vital Signs Last 24 Hrs  T(C): 36.4 (07 Oct 2020 06:29), Max: 36.7 (06 Oct 2020 12:34)  T(F): 97.5 (07 Oct 2020 06:29), Max: 98.1 (06 Oct 2020 12:34)  HR: 89 (07 Oct 2020 10:27) (54 - 100)  BP: 110/68 (07 Oct 2020 06:29) (110/68 - 135/74)  BP(mean): --  RR: 18 (07 Oct 2020 06:29) (18 - 22)  SpO2: 97% (07 Oct 2020 10:27) (90% - 98%)    I&O's Summary      PHYSICAL EXAM:  Constitutional: NAD, well-developed  HEENT NC/AT, moist mucous membranes  Pulmonary: Diminished breath sounds bilaterally   Cardiovascular: RRR, no murmur  Gastrointestinal: Soft, nontender, nondistended, normoactive bowel sounds  Extremities: No peripheral edema   Neurological: Alert, strength and sensitivity are grossly intact  Skin: No obvious lesions/rashes  Psych: Mood & affect appropriate    LABS: All Labs Reviewed:                        10.3   8.87  )-----------( 513      ( 07 Oct 2020 06:55 )             33.0                         9.9    11.41 )-----------( 435      ( 06 Oct 2020 02:51 )             30.5     07 Oct 2020 06:55    131    |  86     |  32     ----------------------------<  230    3.6     |  36     |  1.40   06 Oct 2020 02:51    133    |  90     |  16     ----------------------------<  132    3.8     |  35     |  1.00     Ca    9.6        07 Oct 2020 06:55  Ca    8.9        06 Oct 2020 02:51  Phos  4.7       07 Oct 2020 06:55  Mg     1.5       07 Oct 2020 06:55    TPro  7.8    /  Alb  2.6    /  TBili  0.3    /  DBili  x      /  AST  19     /  ALT  13     /  AlkPhos  88     07 Oct 2020 06:55  TPro  7.8    /  Alb  2.4    /  TBili  0.5    /  DBili  x      /  AST  32     /  ALT  14     /  AlkPhos  87     06 Oct 2020 02:51          Blood Culture: Organism --  Gram Stain Blood -- Gram Stain --  Specimen Source .Blood Blood  Culture-Blood --        10-07 @ 06:55  TSH: 0.37      EKG:    RADIOLOGY:    CXR:   [facility]         Adriana Gonzales, Gina, Funmilayo, Gutierrez, Dimitrios Richardson        [phone] (office)  INCOMPLETE  Reason for Consult: afib    Interval HPI: Patient seen and examined at bedside.   Cardiologist: Dr. Jason Chapa  Stent: left anterior descending artery in the midsegment, midsegment of the right coronary artery, and successful drug-eluting stent placed to the right posterior lateral branch.        HPI:  60F w/ end stage COPD on 3LNC (pending transplant list at [facility]), former smoker, CAD s/p stent (2016), afib on eliquis, uncontrolled DM2 (on insulin), raynaud phenomenon and recent hospitalization at [facility] for confusion and [facility] p/w sob. Sent from [facility]. Patient states that she has had worsening shortness of breath for past two days. Unable to obtain comprehensive history due to dyspnea. Patient denies fever, chills, sore throat, cough, chest pain, palpitations, abd pain, n/v. Currently feels short of breath even after receiving duonebs and steroids in the ED. Unable to keep mask on during interview and lay back in stretcher due to subjective sob. Patient repeatedly stating that it is too hot in her room and fanning herself with a paper. Per chart, Dr. Mathew (PCP) has chart notes regarding possible hallucinations. Would like her home medications now but otherwise has no acute complaints.    In the ED, VS T97.7F  /78 RR 21 SpO2 94% on 4LNC. Labs significant for mild leukocytosis of 11.41, H/H 9.9/30.5, thrombophilia of 435. CO2 35, albumin 2.4.   CXR done showing b/l patchy infiltrates, official read pending  CT chest done showing multifocal PNA and reactive mediastinal lymphadenopathy  EKG read limited by artifact, sinus tachycardia with RBBB and premature supraventricular complexes (06 Oct 2020 04:55)    ECHO: 10/7/2020  Technically difficult and limited study  Mild concentric LVH with grossly normal left ventricular systolic function, estimated LVEF of 55%.  Grossly normal RV size and systolic function.  Aortic valve is not well-visualized, grossly normal function without severe pathology  Mild MR  Trace TR.  No significant pericardial effusion.      PAST MEDICAL & SURGICAL HISTORY:  H/O Raynaud&#x27;s syndrome    Ascorbic acid deficiency    Iron deficiency anemia    Constipation    GERD without esophagitis    Type 2 diabetes mellitus    HTN (hypertension)    Heart failure    HLD (hyperlipidemia)    History of chronic atrial fibrillation  on Eliquis    End stage COPD  on 3LNC    Atrial fibrillation    Hyperlipemia    Chronic sinusitis    Raynaud phenomenon    HTN (hypertension)    DM (diabetes mellitus)    Claustrophobia    COPD (chronic obstructive pulmonary disease)    History of tonsillectomy    S/P tonsillectomy        SOCIAL HISTORY: No active tobacco, alcohol or illicit drug use    FAMILY HISTORY:  FH: myocardial infarction    Family history of CABG    FH: MI (myocardial infarction)    FH: CABG (coronary artery bypass surgery)        Home Medications:  apixaban 5 mg oral tablet: 1 tab(s) orally every 12 hours (01 Aug 2020 21:52)  ascorbic acid 500 mg oral tablet: 1 tab(s) orally once a day (14 Sep 2020 12:58)  aspirin 81 mg oral delayed release tablet: 1 tab(s) orally once a day (01 Aug 2020 21:52)  aspirin 81 mg oral tablet, chewable: 1 tab(s) orally once a day (07 Oct 2020 10:02)  atorvastatin 80 mg oral tablet: 1 tab(s) orally once a day (at bedtime) (14 Sep 2020 12:58)  atorvastatin 80 mg oral tablet: 1 tab(s) orally once a day (07 Oct 2020 10:02)  Basaglar KwikPen 100 units/mL subcutaneous solution: 12 unit(s) subcutaneous once a day (at bedtime) (07 Oct 2020 10:02)  bisacodyl 10 mg rectal suppository: 1 suppository(ies) rectal once a day (14 Sep 2020 12:58)  bumetanide 1 mg oral tablet: 1 tab(s) orally 2 times a day (07 Oct 2020 10:02)  bumetanide 2 mg oral tablet: 1 tab(s) orally every 12 hours (14 Sep 2020 12:58)  DilTIAZem (Eqv-Cardizem CD) 180 mg/24 hours oral capsule, extended release: 1 cap(s) orally once a day (07 Oct 2020 10:02)  Dulcolax Laxative 10 mg rectal suppository: 1 suppository(ies) rectal once a day (07 Oct 2020 10:02)  DuoNeb 0.5 mg-2.5 mg/3 mL inhalation solution: 3 milliliter(s) inhaled every 6 hours (07 Oct 2020 10:)  Eliquis 5 mg oral tablet: 1 tab(s) orally 2 times a day (07 Oct 2020 10:)  ferrous sulfate 325 mg (65 mg elemental iron) oral tablet: 1 tab(s) orally 2 times a day (14 Sep 2020 12:58)  ferrous sulfate 325 mg (65 mg elemental iron) oral tablet: 1 tab(s) orally once a day (07 Oct 2020 10:)  guaiFENesin 100 mg/5 mL oral liquid: 5 milliliter(s) orally every 6 hours, As needed, Cough (14 Sep 2020 12:58)  guaiFENesin 100 mg/5 mL oral liquid: 5 milliliter(s) orally every 6 hours, As Needed (07 Oct 2020 10:)  insulin glargine: 12 unit(s) subcutaneous once a day (at bedtime) (14 Sep 2020 12:58)  lactulose 10 g/15 mL oral solution: 22.5 milliliter(s) orally 2 times a day (07 Oct 2020 10:)  lactulose 10 g/15 mL oral syrup: 22.5 milliliter(s) orally 2 times a day (14 Sep 2020 12:58)  metFORMIN 500 mg oral tablet: 1 tab(s) orally 2 times a day (14 Sep 2020 12:58)  metFORMIN 500 mg oral tablet: 1 tab(s) orally 2 times a day (07 Oct 2020 10:)  MiraLax oral powder for reconstitution: 17 gram(s) orally once a day (07 Oct 2020 10:)  montelukast 10 mg oral tablet: 1 tab(s) orally once a day (at bedtime) (07 Oct 2020 10:)  Multiple Vitamins oral capsule: 1 cap(s) orally once a day (07 Oct 2020 10:)  Multiple Vitamins oral tablet: 1 tab(s) orally once a day (01 Aug 2020 21:52)  mupirocin 2% topical ointment: 1 application topically  (14 Sep 2020 12:58)  oxyCODONE 5 mg oral tablet: 1 tab(s) orally every 6 hours, As needed, Severe Pain (7 - 10) (14 Sep 2020 12:58)  oxyCODONE 5 mg oral tablet: 1 tab(s) orally every 6 hours, As Needed (07 Oct 2020 10:02)  pantoprazole 40 mg oral delayed release tablet: 1 tab(s) orally once a day (before a meal) (01 Aug 2020 21:52)  pantoprazole 40 mg oral granule, delayed release: 1 each orally once a day (07 Oct 2020 10:)  polyethylene glycol 3350 oral powder for reconstitution: 17 gram(s) orally once a day (14 Sep 2020 12:58)  Reglan 5 mg oral tablet: 1 tab(s) orally 4 times a day (before meals and at bedtime) (07 Oct 2020 10:)  Senna 8.6 mg oral tablet: 2 tab(s) orally once a day (at bedtime) (07 Oct 2020 10:)  senna oral tablet: 2 tab(s) orally once a day (at bedtime) (14 Sep 2020 12:58)  Singulair 10 mg oral tablet: 1 tab(s) orally once a day (in the evening) (01 Aug 2020 21:52)  Spiriva 18 mcg inhalation capsule: 1 cap(s) inhaled once a day (01 Aug 2020 21:)  Spiriva 18 mcg inhalation capsule: 1 cap(s) inhaled once a day (07 Oct 2020 10:)  Symbicort 160 mcg-4.5 mcg/inh inhalation aerosol: 2 puff(s) inhaled 2 times a day (01 Aug 2020 21:52)  Symbicort 160 mcg-4.5 mcg/inh inhalation aerosol: 2 puff(s) inhaled 2 times a day (07 Oct 2020 10:)  theophylline 300 mg oral tablet, extended release: 1 tab(s) orally every 12 hours (07 Oct 2020 10:)  theophylline 400 mg/24 hours oral tablet, extended release: 1 tab(s) orally once a day (01 Aug 2020 21:52)  Tylenol 325 mg oral tablet: 2 tab(s) orally every 6 hours, As Needed (07 Oct 2020 10:)  Ventolin HFA 90 mcg/inh inhalation aerosol: 2 puff(s) inhaled every 6 hours, As Needed (07 Oct 2020 10:02)  Ventolin HFA 90 mcg/inh inhalation aerosol: 2 puff(s) inhaled 2 times a day (01 Aug 2020 21:52)  Vitamin C 500 mg oral tablet: 1 tab(s) orally once a day (07 Oct 2020 10:)  Vitamin D3 2000 intl units (50 mcg) oral tablet: 1 tab(s) orally once a day (07 Oct 2020 10:02)  Vitamin D3 2000 intl units oral tablet: 1 tab(s) orally once a day (01 Aug 2020 21:52)  Zofran ODT 4 mg oral tablet, disintegratin tab(s) orally every 8 hours, As Needed (07 Oct 2020 10:02)      MEDICATIONS  (STANDING):  apixaban 5 milliGRAM(s) Oral every 12 hours  ascorbic acid 500 milliGRAM(s) Oral daily  aspirin  chewable 81 milliGRAM(s) Oral daily  atorvastatin 80 milliGRAM(s) Oral at bedtime  azithromycin  IVPB 500 milliGRAM(s) IV Intermittent every 24 hours  budesonide 160 MICROgram(s)/formoterol 4.5 MICROgram(s) Inhaler 2 Puff(s) Inhalation two times a day  buMETAnide 1 milliGRAM(s) Oral two times a day  cholecalciferol 1000 Unit(s) Oral daily  dextrose 5%. 1000 milliLiter(s) (50 mL/Hr) IV Continuous <Continuous>  dextrose 50% Injectable 12.5 Gram(s) IV Push once  dextrose 50% Injectable 25 Gram(s) IV Push once  dextrose 50% Injectable 25 Gram(s) IV Push once  diltiazem    milliGRAM(s) Oral daily  ferrous    sulfate 325 milliGRAM(s) Oral daily  insulin glargine Injectable (LANTUS) 10 Unit(s) SubCutaneous at bedtime  insulin lispro (HumaLOG) corrective regimen sliding scale   SubCutaneous three times a day before meals  methylPREDNISolone sodium succinate Injectable 40 milliGRAM(s) IV Push daily  montelukast 10 milliGRAM(s) Oral at bedtime  multivitamin 1 Tablet(s) Oral daily  pantoprazole    Tablet 40 milliGRAM(s) Oral before breakfast  piperacillin/tazobactam IVPB.. 3.375 Gram(s) IV Intermittent every 8 hours  polyethylene glycol 3350 17 Gram(s) Oral daily  senna 2 Tablet(s) Oral at bedtime  theophylline ER Capsule 100 milliGRAM(s) Oral once  tiotropium 18 MICROgram(s) Capsule 1 Capsule(s) Inhalation daily    MEDICATIONS  (PRN):  acetaminophen   Tablet .. 650 milliGRAM(s) Oral every 6 hours PRN Mild Pain (1 - 3)  albuterol/ipratropium for Nebulization 3 milliLiter(s) Nebulizer every 3 hours PRN Shortness of Breath and/or Wheezing  bisacodyl Suppository 10 milliGRAM(s) Rectal daily PRN Constipation  dextrose 40% Gel 15 Gram(s) Oral once PRN Blood Glucose LESS THAN 70 milliGRAM(s)/deciliter  glucagon  Injectable 1 milliGRAM(s) IntraMuscular once PRN Glucose LESS THAN 70 milligrams/deciliter  guaiFENesin   Syrup  (Sugar-Free) 100 milliGRAM(s) Oral every 6 hours PRN Cough  lactulose Syrup 15 Gram(s) Oral two times a day PRN constipation  oxyCODONE    IR 5 milliGRAM(s) Oral every 6 hours PRN Moderate Pain (4 - 6)      Allergies    No Known Allergies    Intolerances    albuterol (Unknown)      REVIEW OF SYSTEMS: Negative except as per HPI.    VITAL SIGNS:   Vital Signs Last 24 Hrs  T(C): 36.4 (07 Oct 2020 06:29), Max: 36.7 (06 Oct 2020 12:34)  T(F): 97.5 (07 Oct 2020 06:29), Max: 98.1 (06 Oct 2020 12:34)  HR: 89 (07 Oct 2020 10:27) (54 - 100)  BP: 110/68 (07 Oct 2020 06:29) (110/68 - 135/74)  BP(mean): --  RR: 18 (07 Oct 2020 06:29) (18 - 22)  SpO2: 97% (07 Oct 2020 10:27) (90% - 98%)    I&O's Summary      PHYSICAL EXAM:  Constitutional: NAD, well-developed  HEENT NC/AT, moist mucous membranes  Pulmonary: Diminished breath sounds bilaterally   Cardiovascular: RRR, no murmur  Gastrointestinal: Soft, nontender, nondistended, normoactive bowel sounds  Extremities: No peripheral edema   Neurological: Alert, strength and sensitivity are grossly intact  Skin: No obvious lesions/rashes  Psych: Mood & affect appropriate    LABS: All Labs Reviewed:                        10.3   8.87  )-----------( 513      ( 07 Oct 2020 06:55 )             33.0                         9.9    11.41 )-----------( 435      ( 06 Oct 2020 02:51 )             30.5     07 Oct 2020 06:55    131    |  86     |  32     ----------------------------<  230    3.6     |  36     |  1.40   06 Oct 2020 02:51    133    |  90     |  16     ----------------------------<  132    3.8     |  35     |  1.00     Ca    9.6        07 Oct 2020 06:55  Ca    8.9        06 Oct 2020 02:51  Phos  4.7       07 Oct 2020 06:55  Mg     1.5       07 Oct 2020 06:55    TPro  7.8    /  Alb  2.6    /  TBili  0.3    /  DBili  x      /  AST  19     /  ALT  13     /  AlkPhos  88     07 Oct 2020 06:55  TPro  7.8    /  Alb  2.4    /  TBili  0.5    /  DBili  x      /  AST  32     /  ALT  14     /  AlkPhos  87     06 Oct 2020 02:51          Blood Culture: Organism --  Gram Stain Blood -- Gram Stain --  Specimen Source .Blood Blood  Culture-Blood --        10-07 @ 06:55  TSH: 0.37      EKG:    RADIOLOGY:    CXR:   [facility]         Adriana Gonzales, Gina, Funmilayo, Gutierrez, Dylan, Dimitrios        [phone] (office)  INCOMPLETE  Reason for Consult: afib    Interval HPI: Patient seen and examined at bedside. Pt went into afib with a HR of 147 at 10 for about 30minutes. She is now in sinus rhythm. She has SOB and is on NC. Pt denies any CP, palpitations, abdominal pain, or headaches.  Cardiologist: Dr. Jason Chapa  Pt has had 6 stents placed, atrial flutter 2:1 block ablation in 2019    HPI:  60F w/ end stage COPD on 3LNC (pending transplant list at [facility]), former smoker, CAD s/p stent (), afib on eliquis, uncontrolled DM2 (on insulin), raynaud phenomenon and recent hospitalization at [facility] for confusion and [facility] p[facility] sob. [facility] Patient states that she has had worsening shortness of breath for past two days. Unable to obtain comprehensive history due to dyspnea. Patient denies fever, chills, sore throat, cough, chest pain, palpitations, abd pain, n/v. Currently feels short of breath even after receiving duonebs and steroids in the ED. Unable to keep mask on during interview and lay back in stretcher due to subjective sob. Patient repeatedly stating that it is too hot in her room and fanning herself with a paper. Per chart, Dr. Mathew (PCP) has chart notes regarding possible hallucinations. Would like her home medications now but otherwise has no acute complaints.    In the ED, VS T97.7F  /78 RR 21 SpO2 94% on 4LNC. Labs significant for mild leukocytosis of 11.41, H/H 9.9/30.5, thrombophilia of 435. CO2 35, albumin 2.4.   CXR done showing b/l patchy infiltrates, official read pending  CT chest done showing multifocal PNA and reactive mediastinal lymphadenopathy  EKG read limited by artifact, sinus tachycardia with RBBB and premature supraventricular complexes (06 Oct 2020 04:55)    ECHO: 10/7/2020  Technically difficult and limited study  Mild concentric LVH with grossly normal left ventricular systolic function, estimated LVEF of 55%.  Grossly normal RV size and systolic function.  Aortic valve is not well-visualized, grossly normal function without severe pathology  Mild MR  Trace TR.  No significant pericardial effusion.      PAST MEDICAL & SURGICAL HISTORY:  H/O Raynaud&#x27;s syndrome    Ascorbic acid deficiency    Iron deficiency anemia    Constipation    GERD without esophagitis    Type 2 diabetes mellitus    HTN (hypertension)    Heart failure    HLD (hyperlipidemia)    History of chronic atrial fibrillation  on Eliquis    End stage COPD  on 3LNC    Atrial fibrillation    Hyperlipemia    Chronic sinusitis    Raynaud phenomenon    HTN (hypertension)    DM (diabetes mellitus)    Claustrophobia    COPD (chronic obstructive pulmonary disease)    History of tonsillectomy    S/P tonsillectomy        SOCIAL HISTORY: No active tobacco, alcohol or illicit drug use    FAMILY HISTORY:  FH: myocardial infarction    Family history of CABG    FH: MI (myocardial infarction)    FH: CABG (coronary artery bypass surgery)        Home Medications:  apixaban 5 mg oral tablet: 1 tab(s) orally every 12 hours (01 Aug 2020 21:52)  ascorbic acid 500 mg oral tablet: 1 tab(s) orally once a day (14 Sep 2020 12:58)  aspirin 81 mg oral delayed release tablet: 1 tab(s) orally once a day (01 Aug 2020 21:52)  aspirin 81 mg oral tablet, chewable: 1 tab(s) orally once a day (07 Oct 2020 10:02)  atorvastatin 80 mg oral tablet: 1 tab(s) orally once a day (at bedtime) (14 Sep 2020 12:58)  atorvastatin 80 mg oral tablet: 1 tab(s) orally once a day (07 Oct 2020 10:02)  Basaglar KwikPen 100 units/mL subcutaneous solution: 12 unit(s) subcutaneous once a day (at bedtime) (07 Oct 2020 10:02)  bisacodyl 10 mg rectal suppository: 1 suppository(ies) rectal once a day (14 Sep 2020 12:58)  bumetanide 1 mg oral tablet: 1 tab(s) orally 2 times a day (07 Oct 2020 10:02)  bumetanide 2 mg oral tablet: 1 tab(s) orally every 12 hours (14 Sep 2020 12:58)  DilTIAZem (Eqv-Cardizem CD) 180 mg/24 hours oral capsule, extended release: 1 cap(s) orally once a day (07 Oct 2020 10:02)  Dulcolax Laxative 10 mg rectal suppository: 1 suppository(ies) rectal once a day (07 Oct 2020 10:)  DuoNeb 0.5 mg-2.5 mg/3 mL inhalation solution: 3 milliliter(s) inhaled every 6 hours (07 Oct 2020 10:)  Eliquis 5 mg oral tablet: 1 tab(s) orally 2 times a day (07 Oct 2020 10:)  ferrous sulfate 325 mg (65 mg elemental iron) oral tablet: 1 tab(s) orally 2 times a day (14 Sep 2020 12:58)  ferrous sulfate 325 mg (65 mg elemental iron) oral tablet: 1 tab(s) orally once a day (07 Oct 2020 10:)  guaiFENesin 100 mg/5 mL oral liquid: 5 milliliter(s) orally every 6 hours, As needed, Cough (14 Sep 2020 12:58)  guaiFENesin 100 mg/5 mL oral liquid: 5 milliliter(s) orally every 6 hours, As Needed (07 Oct 2020 10:)  insulin glargine: 12 unit(s) subcutaneous once a day (at bedtime) (14 Sep 2020 12:58)  lactulose 10 g/15 mL oral solution: 22.5 milliliter(s) orally 2 times a day (07 Oct 2020 10:)  lactulose 10 g/15 mL oral syrup: 22.5 milliliter(s) orally 2 times a day (14 Sep 2020 12:58)  metFORMIN 500 mg oral tablet: 1 tab(s) orally 2 times a day (14 Sep 2020 12:58)  metFORMIN 500 mg oral tablet: 1 tab(s) orally 2 times a day (07 Oct 2020 10:)  MiraLax oral powder for reconstitution: 17 gram(s) orally once a day (07 Oct 2020 10:)  montelukast 10 mg oral tablet: 1 tab(s) orally once a day (at bedtime) (07 Oct 2020 10:)  Multiple Vitamins oral capsule: 1 cap(s) orally once a day (07 Oct 2020 10:)  Multiple Vitamins oral tablet: 1 tab(s) orally once a day (01 Aug 2020 21:52)  mupirocin 2% topical ointment: 1 application topically  (14 Sep 2020 12:58)  oxyCODONE 5 mg oral tablet: 1 tab(s) orally every 6 hours, As needed, Severe Pain (7 - 10) (14 Sep 2020 12:58)  oxyCODONE 5 mg oral tablet: 1 tab(s) orally every 6 hours, As Needed (07 Oct 2020 10:)  pantoprazole 40 mg oral delayed release tablet: 1 tab(s) orally once a day (before a meal) (01 Aug 2020 21:52)  pantoprazole 40 mg oral granule, delayed release: 1 each orally once a day (07 Oct 2020 10:)  polyethylene glycol 3350 oral powder for reconstitution: 17 gram(s) orally once a day (14 Sep 2020 12:58)  Reglan 5 mg oral tablet: 1 tab(s) orally 4 times a day (before meals and at bedtime) (07 Oct 2020 10:)  Senna 8.6 mg oral tablet: 2 tab(s) orally once a day (at bedtime) (07 Oct 2020 10:)  senna oral tablet: 2 tab(s) orally once a day (at bedtime) (14 Sep 2020 12:58)  Singulair 10 mg oral tablet: 1 tab(s) orally once a day (in the evening) (01 Aug 2020 21:)  Spiriva 18 mcg inhalation capsule: 1 cap(s) inhaled once a day (01 Aug 2020 21:)  Spiriva 18 mcg inhalation capsule: 1 cap(s) inhaled once a day (07 Oct 2020 10:)  Symbicort 160 mcg-4.5 mcg/inh inhalation aerosol: 2 puff(s) inhaled 2 times a day (01 Aug 2020 21:)  Symbicort 160 mcg-4.5 mcg/inh inhalation aerosol: 2 puff(s) inhaled 2 times a day (07 Oct 2020 10:)  theophylline 300 mg oral tablet, extended release: 1 tab(s) orally every 12 hours (07 Oct 2020 10:)  theophylline 400 mg/24 hours oral tablet, extended release: 1 tab(s) orally once a day (01 Aug 2020 21:52)  Tylenol 325 mg oral tablet: 2 tab(s) orally every 6 hours, As Needed (07 Oct 2020 10:)  Ventolin HFA 90 mcg/inh inhalation aerosol: 2 puff(s) inhaled every 6 hours, As Needed (07 Oct 2020 10:)  Ventolin HFA 90 mcg/inh inhalation aerosol: 2 puff(s) inhaled 2 times a day (01 Aug 2020 21:)  Vitamin C 500 mg oral tablet: 1 tab(s) orally once a day (07 Oct 2020 10:02)  Vitamin D3 2000 intl units (50 mcg) oral tablet: 1 tab(s) orally once a day (07 Oct 2020 10:02)  Vitamin D3 2000 intl units oral tablet: 1 tab(s) orally once a day (01 Aug 2020 21:52)  Zofran ODT 4 mg oral tablet, disintegratin tab(s) orally every 8 hours, As Needed (07 Oct 2020 10:02)      MEDICATIONS  (STANDING):  apixaban 5 milliGRAM(s) Oral every 12 hours  ascorbic acid 500 milliGRAM(s) Oral daily  aspirin  chewable 81 milliGRAM(s) Oral daily  atorvastatin 80 milliGRAM(s) Oral at bedtime  azithromycin  IVPB 500 milliGRAM(s) IV Intermittent every 24 hours  budesonide 160 MICROgram(s)/formoterol 4.5 MICROgram(s) Inhaler 2 Puff(s) Inhalation two times a day  buMETAnide 1 milliGRAM(s) Oral two times a day  cholecalciferol 1000 Unit(s) Oral daily  dextrose 5%. 1000 milliLiter(s) (50 mL/Hr) IV Continuous <Continuous>  dextrose 50% Injectable 12.5 Gram(s) IV Push once  dextrose 50% Injectable 25 Gram(s) IV Push once  dextrose 50% Injectable 25 Gram(s) IV Push once  diltiazem    milliGRAM(s) Oral daily  ferrous    sulfate 325 milliGRAM(s) Oral daily  insulin glargine Injectable (LANTUS) 10 Unit(s) SubCutaneous at bedtime  insulin lispro (HumaLOG) corrective regimen sliding scale   SubCutaneous three times a day before meals  methylPREDNISolone sodium succinate Injectable 40 milliGRAM(s) IV Push daily  montelukast 10 milliGRAM(s) Oral at bedtime  multivitamin 1 Tablet(s) Oral daily  pantoprazole    Tablet 40 milliGRAM(s) Oral before breakfast  piperacillin/tazobactam IVPB.. 3.375 Gram(s) IV Intermittent every 8 hours  polyethylene glycol 3350 17 Gram(s) Oral daily  senna 2 Tablet(s) Oral at bedtime  theophylline ER Capsule 100 milliGRAM(s) Oral once  tiotropium 18 MICROgram(s) Capsule 1 Capsule(s) Inhalation daily    MEDICATIONS  (PRN):  acetaminophen   Tablet .. 650 milliGRAM(s) Oral every 6 hours PRN Mild Pain (1 - 3)  albuterol/ipratropium for Nebulization 3 milliLiter(s) Nebulizer every 3 hours PRN Shortness of Breath and/or Wheezing  bisacodyl Suppository 10 milliGRAM(s) Rectal daily PRN Constipation  dextrose 40% Gel 15 Gram(s) Oral once PRN Blood Glucose LESS THAN 70 milliGRAM(s)/deciliter  glucagon  Injectable 1 milliGRAM(s) IntraMuscular once PRN Glucose LESS THAN 70 milligrams/deciliter  guaiFENesin   Syrup  (Sugar-Free) 100 milliGRAM(s) Oral every 6 hours PRN Cough  lactulose Syrup 15 Gram(s) Oral two times a day PRN constipation  oxyCODONE    IR 5 milliGRAM(s) Oral every 6 hours PRN Moderate Pain (4 - 6)      Allergies    No Known Allergies    Intolerances    albuterol (Unknown)      REVIEW OF SYSTEMS: Negative except as per HPI.    VITAL SIGNS:   Vital Signs Last 24 Hrs  T(C): 36.4 (07 Oct 2020 06:29), Max: 36.7 (06 Oct 2020 12:34)  T(F): 97.5 (07 Oct 2020 06:29), Max: 98.1 (06 Oct 2020 12:34)  HR: 89 (07 Oct 2020 10:27) (54 - 100)  BP: 110/68 (07 Oct 2020 06:29) (110/68 - 135/74)  BP(mean): --  RR: 18 (07 Oct 2020 06:29) (18 - 22)  SpO2: 97% (07 Oct 2020 10:27) (90% - 98%)    I&O's Summary      PHYSICAL EXAM:  Constitutional: NAD, well-developed  HEENT NC/AT, moist mucous membranes  Pulmonary: Diminished breath sounds bilaterally   Cardiovascular: RRR, no murmur  Gastrointestinal: Soft, nontender, nondistended, normoactive bowel sounds  Extremities: No peripheral edema   Neurological: Alert, strength and sensitivity are grossly intact  Skin: No obvious lesions/rashes  Psych: Mood & affect appropriate    LABS: All Labs Reviewed:                        10.3   8.87  )-----------( 513      ( 07 Oct 2020 06:55 )             33.0                         9.9    11.41 )-----------( 435      ( 06 Oct 2020 02:51 )             30.5     07 Oct 2020 06:55    131    |  86     |  32     ----------------------------<  230    3.6     |  36     |  1.40   06 Oct 2020 02:51    133    |  90     |  16     ----------------------------<  132    3.8     |  35     |  1.00     Ca    9.6        07 Oct 2020 06:55  Ca    8.9        06 Oct 2020 02:51  Phos  4.7       07 Oct 2020 06:55  Mg     1.5       07 Oct 2020 06:55    TPro  7.8    /  Alb  2.6    /  TBili  0.3    /  DBili  x      /  AST  19     /  ALT  13     /  AlkPhos  88     07 Oct 2020 06:55  TPro  7.8    /  Alb  2.4    /  TBili  0.5    /  DBili  x      /  AST  32     /  ALT  14     /  AlkPhos  87     06 Oct 2020 02:51          Blood Culture: Organism --  Gram Stain Blood -- Gram Stain --  Specimen Source .Blood Blood  Culture-Blood --        10-07 @ 06:55  TSH: 0.37      EKG:    RADIOLOGY:    CXR:   [facility]         Adriana Gonzales, Gina, Funmilayo, Gutierrez, Dylan, Dimitrios        [phone] (office)    Reason for Consult: afib    Interval HPI: Patient seen and examined at bedside. Pt went into afib with a HR of 147 at 10 for about 30minutes. She is now in sinus rhythm. She has SOB and is on NC. Pt denies any CP, palpitations, abdominal pain, or headaches.  Cardiologist: Dr. Jason Chapa  Pt has had 6 stents placed, atrial flutter 2:1 block ablation in 2019    HPI:  60F w/ end stage COPD on 3LNC (pending transplant list at [facility]), former smoker, CAD s/p stent (), afib on eliquis, uncontrolled DM2 (on insulin), raynaud phenomenon and recent hospitalization at [facility] for confusion and [facility] p[facility] sob. [facility] Patient states that she has had worsening shortness of breath for past two days. Unable to obtain comprehensive history due to dyspnea. Patient denies fever, chills, sore throat, cough, chest pain, palpitations, abd pain, n/v. Currently feels short of breath even after receiving duonebs and steroids in the ED. Unable to keep mask on during interview and lay back in stretcher due to subjective sob. Patient repeatedly stating that it is too hot in her room and fanning herself with a paper. Per chart, Dr. Mathew (PCP) has chart notes regarding possible hallucinations. Would like her home medications now but otherwise has no acute complaints.    In the ED, VS T97.7F  /78 RR 21 SpO2 94% on 4LNC. Labs significant for mild leukocytosis of 11.41, H/H 9.9/30.5, thrombophilia of 435. CO2 35, albumin 2.4.   CXR done showing b/l patchy infiltrates, official read pending  CT chest done showing multifocal PNA and reactive mediastinal lymphadenopathy  EKG read limited by artifact, sinus tachycardia with RBBB and premature supraventricular complexes (06 Oct 2020 04:55)    ECHO: 10/7/2020  Technically difficult and limited study  Mild concentric LVH with grossly normal left ventricular systolic function, estimated LVEF of 55%.  Grossly normal RV size and systolic function.  Aortic valve is not well-visualized, grossly normal function without severe pathology  Mild MR  Trace TR.  No significant pericardial effusion.      PAST MEDICAL & SURGICAL HISTORY:  H/O Raynaud&#x27;s syndrome    Ascorbic acid deficiency    Iron deficiency anemia    Constipation    GERD without esophagitis    Type 2 diabetes mellitus    HTN (hypertension)    Heart failure    HLD (hyperlipidemia)    History of chronic atrial fibrillation  on Eliquis    End stage COPD  on 3LNC    Atrial fibrillation    Hyperlipemia    Chronic sinusitis    Raynaud phenomenon    HTN (hypertension)    DM (diabetes mellitus)    Claustrophobia    COPD (chronic obstructive pulmonary disease)    History of tonsillectomy    S/P tonsillectomy        SOCIAL HISTORY: No active tobacco, alcohol or illicit drug use    FAMILY HISTORY:  FH: myocardial infarction    Family history of CABG    FH: MI (myocardial infarction)    FH: CABG (coronary artery bypass surgery)        Home Medications:  apixaban 5 mg oral tablet: 1 tab(s) orally every 12 hours (01 Aug 2020 21:52)  ascorbic acid 500 mg oral tablet: 1 tab(s) orally once a day (14 Sep 2020 12:58)  aspirin 81 mg oral delayed release tablet: 1 tab(s) orally once a day (01 Aug 2020 21:52)  aspirin 81 mg oral tablet, chewable: 1 tab(s) orally once a day (07 Oct 2020 10:02)  atorvastatin 80 mg oral tablet: 1 tab(s) orally once a day (at bedtime) (14 Sep 2020 12:58)  atorvastatin 80 mg oral tablet: 1 tab(s) orally once a day (07 Oct 2020 10:02)  Basaglar KwikPen 100 units/mL subcutaneous solution: 12 unit(s) subcutaneous once a day (at bedtime) (07 Oct 2020 10:02)  bisacodyl 10 mg rectal suppository: 1 suppository(ies) rectal once a day (14 Sep 2020 12:58)  bumetanide 1 mg oral tablet: 1 tab(s) orally 2 times a day (07 Oct 2020 10:02)  bumetanide 2 mg oral tablet: 1 tab(s) orally every 12 hours (14 Sep 2020 12:58)  DilTIAZem (Eqv-Cardizem CD) 180 mg/24 hours oral capsule, extended release: 1 cap(s) orally once a day (07 Oct 2020 10:02)  Dulcolax Laxative 10 mg rectal suppository: 1 suppository(ies) rectal once a day (07 Oct 2020 10:)  DuoNeb 0.5 mg-2.5 mg/3 mL inhalation solution: 3 milliliter(s) inhaled every 6 hours (07 Oct 2020 10:)  Eliquis 5 mg oral tablet: 1 tab(s) orally 2 times a day (07 Oct 2020 10:)  ferrous sulfate 325 mg (65 mg elemental iron) oral tablet: 1 tab(s) orally 2 times a day (14 Sep 2020 12:58)  ferrous sulfate 325 mg (65 mg elemental iron) oral tablet: 1 tab(s) orally once a day (07 Oct 2020 10:)  guaiFENesin 100 mg/5 mL oral liquid: 5 milliliter(s) orally every 6 hours, As needed, Cough (14 Sep 2020 12:)  guaiFENesin 100 mg/5 mL oral liquid: 5 milliliter(s) orally every 6 hours, As Needed (07 Oct 2020 10:)  insulin glargine: 12 unit(s) subcutaneous once a day (at bedtime) (14 Sep 2020 12:58)  lactulose 10 g/15 mL oral solution: 22.5 milliliter(s) orally 2 times a day (07 Oct 2020 10:)  lactulose 10 g/15 mL oral syrup: 22.5 milliliter(s) orally 2 times a day (14 Sep 2020 12:58)  metFORMIN 500 mg oral tablet: 1 tab(s) orally 2 times a day (14 Sep 2020 12:58)  metFORMIN 500 mg oral tablet: 1 tab(s) orally 2 times a day (07 Oct 2020 10:)  MiraLax oral powder for reconstitution: 17 gram(s) orally once a day (07 Oct 2020 10:)  montelukast 10 mg oral tablet: 1 tab(s) orally once a day (at bedtime) (07 Oct 2020 10:)  Multiple Vitamins oral capsule: 1 cap(s) orally once a day (07 Oct 2020 10:)  Multiple Vitamins oral tablet: 1 tab(s) orally once a day (01 Aug 2020 21:52)  mupirocin 2% topical ointment: 1 application topically  (14 Sep 2020 12:58)  oxyCODONE 5 mg oral tablet: 1 tab(s) orally every 6 hours, As needed, Severe Pain (7 - 10) (14 Sep 2020 12:58)  oxyCODONE 5 mg oral tablet: 1 tab(s) orally every 6 hours, As Needed (07 Oct 2020 10:)  pantoprazole 40 mg oral delayed release tablet: 1 tab(s) orally once a day (before a meal) (01 Aug 2020 21:52)  pantoprazole 40 mg oral granule, delayed release: 1 each orally once a day (07 Oct 2020 10:)  polyethylene glycol 3350 oral powder for reconstitution: 17 gram(s) orally once a day (14 Sep 2020 12:58)  Reglan 5 mg oral tablet: 1 tab(s) orally 4 times a day (before meals and at bedtime) (07 Oct 2020 10:)  Senna 8.6 mg oral tablet: 2 tab(s) orally once a day (at bedtime) (07 Oct 2020 10:)  senna oral tablet: 2 tab(s) orally once a day (at bedtime) (14 Sep 2020 12:58)  Singulair 10 mg oral tablet: 1 tab(s) orally once a day (in the evening) (01 Aug 2020 21:)  Spiriva 18 mcg inhalation capsule: 1 cap(s) inhaled once a day (01 Aug 2020 21:52)  Spiriva 18 mcg inhalation capsule: 1 cap(s) inhaled once a day (07 Oct 2020 10:)  Symbicort 160 mcg-4.5 mcg/inh inhalation aerosol: 2 puff(s) inhaled 2 times a day (01 Aug 2020 21:)  Symbicort 160 mcg-4.5 mcg/inh inhalation aerosol: 2 puff(s) inhaled 2 times a day (07 Oct 2020 10:)  theophylline 300 mg oral tablet, extended release: 1 tab(s) orally every 12 hours (07 Oct 2020 10:)  theophylline 400 mg/24 hours oral tablet, extended release: 1 tab(s) orally once a day (01 Aug 2020 21:52)  Tylenol 325 mg oral tablet: 2 tab(s) orally every 6 hours, As Needed (07 Oct 2020 10:)  Ventolin HFA 90 mcg/inh inhalation aerosol: 2 puff(s) inhaled every 6 hours, As Needed (07 Oct 2020 10:)  Ventolin HFA 90 mcg/inh inhalation aerosol: 2 puff(s) inhaled 2 times a day (01 Aug 2020 21:52)  Vitamin C 500 mg oral tablet: 1 tab(s) orally once a day (07 Oct 2020 10:02)  Vitamin D3 2000 intl units (50 mcg) oral tablet: 1 tab(s) orally once a day (07 Oct 2020 10:02)  Vitamin D3 2000 intl units oral tablet: 1 tab(s) orally once a day (01 Aug 2020 21:52)  Zofran ODT 4 mg oral tablet, disintegratin tab(s) orally every 8 hours, As Needed (07 Oct 2020 10:02)      MEDICATIONS  (STANDING):  apixaban 5 milliGRAM(s) Oral every 12 hours  ascorbic acid 500 milliGRAM(s) Oral daily  aspirin  chewable 81 milliGRAM(s) Oral daily  atorvastatin 80 milliGRAM(s) Oral at bedtime  azithromycin  IVPB 500 milliGRAM(s) IV Intermittent every 24 hours  budesonide 160 MICROgram(s)/formoterol 4.5 MICROgram(s) Inhaler 2 Puff(s) Inhalation two times a day  buMETAnide 1 milliGRAM(s) Oral two times a day  cholecalciferol 1000 Unit(s) Oral daily  dextrose 5%. 1000 milliLiter(s) (50 mL/Hr) IV Continuous <Continuous>  dextrose 50% Injectable 12.5 Gram(s) IV Push once  dextrose 50% Injectable 25 Gram(s) IV Push once  dextrose 50% Injectable 25 Gram(s) IV Push once  diltiazem    milliGRAM(s) Oral daily  ferrous    sulfate 325 milliGRAM(s) Oral daily  insulin glargine Injectable (LANTUS) 10 Unit(s) SubCutaneous at bedtime  insulin lispro (HumaLOG) corrective regimen sliding scale   SubCutaneous three times a day before meals  methylPREDNISolone sodium succinate Injectable 40 milliGRAM(s) IV Push daily  montelukast 10 milliGRAM(s) Oral at bedtime  multivitamin 1 Tablet(s) Oral daily  pantoprazole    Tablet 40 milliGRAM(s) Oral before breakfast  piperacillin/tazobactam IVPB.. 3.375 Gram(s) IV Intermittent every 8 hours  polyethylene glycol 3350 17 Gram(s) Oral daily  senna 2 Tablet(s) Oral at bedtime  theophylline ER Capsule 100 milliGRAM(s) Oral once  tiotropium 18 MICROgram(s) Capsule 1 Capsule(s) Inhalation daily    MEDICATIONS  (PRN):  acetaminophen   Tablet .. 650 milliGRAM(s) Oral every 6 hours PRN Mild Pain (1 - 3)  albuterol/ipratropium for Nebulization 3 milliLiter(s) Nebulizer every 3 hours PRN Shortness of Breath and/or Wheezing  bisacodyl Suppository 10 milliGRAM(s) Rectal daily PRN Constipation  dextrose 40% Gel 15 Gram(s) Oral once PRN Blood Glucose LESS THAN 70 milliGRAM(s)/deciliter  glucagon  Injectable 1 milliGRAM(s) IntraMuscular once PRN Glucose LESS THAN 70 milligrams/deciliter  guaiFENesin   Syrup  (Sugar-Free) 100 milliGRAM(s) Oral every 6 hours PRN Cough  lactulose Syrup 15 Gram(s) Oral two times a day PRN constipation  oxyCODONE    IR 5 milliGRAM(s) Oral every 6 hours PRN Moderate Pain (4 - 6)      Allergies    No Known Allergies    Intolerances    albuterol (Unknown)      REVIEW OF SYSTEMS: Negative except as per HPI.    VITAL SIGNS:   Vital Signs Last 24 Hrs  T(C): 36.4 (07 Oct 2020 06:29), Max: 36.7 (06 Oct 2020 12:34)  T(F): 97.5 (07 Oct 2020 06:29), Max: 98.1 (06 Oct 2020 12:34)  HR: 89 (07 Oct 2020 10:27) (54 - 100)  BP: 110/68 (07 Oct 2020 06:29) (110/68 - 135/74)  BP(mean): --  RR: 18 (07 Oct 2020 06:29) (18 - 22)  SpO2: 97% (07 Oct 2020 10:27) (90% - 98%)    I&O's Summary      PHYSICAL EXAM:  Constitutional: NAD, well-developed  HEENT NC/AT, moist mucous membranes  Pulmonary: Diminished breath sounds bilaterally   Cardiovascular: RRR, no murmur  Gastrointestinal: Soft, nontender, nondistended, normoactive bowel sounds  Extremities: No peripheral edema   Neurological: Alert, strength and sensitivity are grossly intact  Skin: No obvious lesions/rashes  Psych: Mood & affect appropriate    LABS: All Labs Reviewed:                        10.3   8.87  )-----------( 513      ( 07 Oct 2020 06:55 )             33.0                         9.9    11.41 )-----------( 435      ( 06 Oct 2020 02:51 )             30.5     07 Oct 2020 06:55    131    |  86     |  32     ----------------------------<  230    3.6     |  36     |  1.40   06 Oct 2020 02:51    133    |  90     |  16     ----------------------------<  132    3.8     |  35     |  1.00     Ca    9.6        07 Oct 2020 06:55  Ca    8.9        06 Oct 2020 02:51  Phos  4.7       07 Oct 2020 06:55  Mg     1.5       07 Oct 2020 06:55    TPro  7.8    /  Alb  2.6    /  TBili  0.3    /  DBili  x      /  AST  19     /  ALT  13     /  AlkPhos  88     07 Oct 2020 06:55  TPro  7.8    /  Alb  2.4    /  TBili  0.5    /  DBili  x      /  AST  32     /  ALT  14     /  AlkPhos  87     06 Oct 2020 02:51          Blood Culture: Organism --  Gram Stain Blood -- Gram Stain --  Specimen Source .Blood Blood  Culture-Blood --        10-07 @ 06:55  TSH: 0.37      EKG: Sinus tachycardia with premature complexes, RBBB, left fasicular block     RADIOLOGY:    CXR:

## 2020-10-07 NOTE — PROGRESS NOTE ADULT - PROBLEM SELECTOR PLAN 5
/78 in ED  -on cardizem at rehab, continue  -monitor routine hemodynamics BP stable  -on cardizem at rehab, continue  -monitor routine hemodynamics

## 2020-10-07 NOTE — PROGRESS NOTE ADULT - PROBLEM SELECTOR PLAN 4
on metformin and glargine 12 U qhs, hold metformin while in hospital  - glargine 10units qhs - tirated up from 8units in setting of steroids, hyperglycemia   - moderate dose ISS  - accuchecks, hypoglycemia protocol  - Hba1c: 6.0

## 2020-10-07 NOTE — GOALS OF CARE CONVERSATION - ADVANCED CARE PLANNING - CONVERSATION DETAILS
met pt, after speaking to Wang Hanson CM: pt uses O2 at home, goal to be on lung transplant list, wants [facility], not [facility], pt has 2 brothers, transport pt to MD offices.  pt with SOB, on O2, pt approached regarding HCP, educated on role of hcp, pt declines to complete hcp or take a copy to think about at home.  pt unsure who will be her hcp, she has 2 brothers, Michele is younger brother.  pt spoke of coming from a long line of ill family, including her mother, she knows all about a hcp and the decisions that need to be made, but declines to discuss further, PC RN contact # given to pt, will follow as needed.

## 2020-10-07 NOTE — CONSULT NOTE ADULT - ASSESSMENT
INCOMPLETE 60F w/ end stage COPD on 3LNC (pending transplant list at [facility]), former smoker, CAD s/p stent (2016), afib on eliquis, uncontrolled DM2 (on insulin), raynaud phenomenon and recent hospitalization at [facility] for confusion and AURORA p/w sob, admitted for COPD exacerbation and multifocal PNA  INCOMPLETE    afib  Patient with known paroxysmal afib on eliquis 5mg and cardizem  mg for rate control. She went into afib with a HR in the 140s at 10am for about 30 mins. She is now sinus.   - Continue eliquis 5mg  - Continue diltiazem    60F w/ end stage COPD on 3LNC (pending transplant list at [facility]), former smoker, CAD s/p stent (2016), afib on eliquis, uncontrolled DM2 (on insulin), raynaud phenomenon and recent hospitalization at [facility] for confusion and AURORA p/w sob, admitted for COPD exacerbation and multifocal PNA    Afib  Patient with known paroxysmal afib on eliquis 5mg and cardizem  mg for rate control. She went into afib with a HR in the 140s at 10am for about 30 mins. She is now sinus.   - Continue eliquis 5mg  - Continue home dose of cardizem     - Other cardiovascular workup will depend on clinical course.  - All other workup per primary team.  - Will continue to follow.

## 2020-10-07 NOTE — PROGRESS NOTE ADULT - ASSESSMENT
60F w/ end stage COPD on 3LNC (pending transplant list at [facility]), former smoker, CAD s/p stent (2016), afib on eliquis, uncontrolled DM2 (on insulin), raynaud phenomenon and recent hospitalization at [facility] for confusion and AURORA p/w sob, admitted for COPD exacerbation and multifocal PNA. ID Dr Juarez to tailor abx, 5L NC currently, ween off O2 as tolerated. 60F w/ end stage COPD on 3LNC (trying to get on  transplant list at [facility]), former smoker, CAD s/p stent (2016), afib on eliquis, uncontrolled DM2 (on insulin), raynaud phenomenon and recent hospitalization at [facility] for confusion and AURORA p/w sob, admitted for acute on chronic resp failure with hypoxia and hyercarbia sec to multifocal PNA and COPD exacerabtion with end stage COPD.

## 2020-10-07 NOTE — CONSULT NOTE ADULT - SUBJECTIVE AND OBJECTIVE BOX
PULMONARY/CRITICAL CARE    Asked to take over pulmonary care.     Patient is a 60y old  Female who presents with a chief complaint of COPD exacerbation, PNA (06 Oct 2020 14:10)    BRIEF HOSPITAL COURSE: ***59y/o woman with end stage COPD on 3L O2 at home awaiting transplant at [facility], former smoker, CAD s/p stent, afib, DM2), raynaud phenomenon and recent hospitalization at [facility] for confusion and AURORA has been sent from [facility] with worsening of SOB. He is very short of breath at rest, worst with any movement, unable to complete her sentences. No fever. Has some cough but no sputum at this time.   CXR done showing b/l patchy infiltrates,   CT chest done showing multifocal PNA and reactive mediastinal lymphadenopathy  She has been started on azithromycin and ceftriaxone and ID was called for further recommendation.   Pt. improved today.  Uses CPAP hs and prn.  No sputum.    Events last 24 hours: ***    PAST MEDICAL & SURGICAL HISTORY:  H/O Raynaud&#x27;s syndrome    Ascorbic acid deficiency    Iron deficiency anemia    Constipation    GERD without esophagitis    Type 2 diabetes mellitus    HTN (hypertension)    Heart failure    HLD (hyperlipidemia)    History of chronic atrial fibrillation  on Eliquis    End stage COPD  on 3LNC    History of tonsillectomy      Allergies    No Known Allergies    Intolerances      FAMILY HISTORY:  FH: myocardial infarction    Family history of CABG        Review of Systems:  CONSTITUTIONAL: No fever, chills, or fatigue  EYES: No eye pain, visual disturbances, or discharge  ENMT:  No difficulty hearing, tinnitus, vertigo; No sinus or throat pain  NECK: No pain or stiffness  RESPIRATORY: minimal  cough, wheezing, no chills or hemoptysis; has shortness of breath  CARDIOVASCULAR: No chest pain, palpitations, dizziness, or leg swelling  GASTROINTESTINAL: No abdominal or epigastric pain. No nausea, vomiting, or hematemesis; No diarrhea or constipation. No melena or hematochezia.  GENITOURINARY: No dysuria, frequency, hematuria, or incontinence  NEUROLOGICAL: No headaches, memory loss, loss of strength, numbness, or tremors  SKIN: No itching, burning, rashes, or lesions   MUSCULOSKELETAL: No joint pain or swelling; No muscle, back, or extremity pain  Poor ambulation  PSYCHIATRIC: No depression, anxiety, mood swings, or difficulty sleeping      Medications:  azithromycin  IVPB 500 milliGRAM(s) IV Intermittent every 24 hours  piperacillin/tazobactam IVPB.. 3.375 Gram(s) IV Intermittent every 8 hours    buMETAnide 1 milliGRAM(s) Oral two times a day  diltiazem    milliGRAM(s) Oral daily    albuterol/ipratropium for Nebulization 3 milliLiter(s) Nebulizer every 3 hours PRN  budesonide 160 MICROgram(s)/formoterol 4.5 MICROgram(s) Inhaler 2 Puff(s) Inhalation two times a day  guaiFENesin   Syrup  (Sugar-Free) 100 milliGRAM(s) Oral every 6 hours PRN  montelukast 10 milliGRAM(s) Oral at bedtime  theophylline ER Capsule 300 milliGRAM(s) Oral <User Schedule>  tiotropium 18 MICROgram(s) Capsule 1 Capsule(s) Inhalation daily    acetaminophen   Tablet .. 650 milliGRAM(s) Oral every 6 hours PRN  oxyCODONE    IR 5 milliGRAM(s) Oral every 6 hours PRN      apixaban 5 milliGRAM(s) Oral every 12 hours  aspirin  chewable 81 milliGRAM(s) Oral daily    bisacodyl Suppository 10 milliGRAM(s) Rectal daily PRN  lactulose Syrup 15 Gram(s) Oral two times a day PRN  pantoprazole    Tablet 40 milliGRAM(s) Oral before breakfast  polyethylene glycol 3350 17 Gram(s) Oral daily  senna 2 Tablet(s) Oral at bedtime      atorvastatin 80 milliGRAM(s) Oral at bedtime  dextrose 40% Gel 15 Gram(s) Oral once PRN  dextrose 50% Injectable 12.5 Gram(s) IV Push once  dextrose 50% Injectable 25 Gram(s) IV Push once  dextrose 50% Injectable 25 Gram(s) IV Push once  glucagon  Injectable 1 milliGRAM(s) IntraMuscular once PRN  insulin glargine Injectable (LANTUS) 10 Unit(s) SubCutaneous at bedtime  insulin lispro (HumaLOG) corrective regimen sliding scale   SubCutaneous three times a day before meals  methylPREDNISolone sodium succinate Injectable 40 milliGRAM(s) IV Push daily    ascorbic acid 500 milliGRAM(s) Oral daily  cholecalciferol 1000 Unit(s) Oral daily  dextrose 5%. 1000 milliLiter(s) IV Continuous <Continuous>  ferrous    sulfate 325 milliGRAM(s) Oral daily  magnesium sulfate  IVPB 1 Gram(s) IV Intermittent once  multivitamin 1 Tablet(s) Oral daily                ICU Vital Signs Last 24 Hrs  T(C): 36.4 (07 Oct 2020 06:29), Max: 36.7 (06 Oct 2020 09:27)  T(F): 97.5 (07 Oct 2020 06:29), Max: 98.1 (06 Oct 2020 12:34)  HR: 84 (07 Oct 2020 08:09) (54 - 100)  BP: 110/68 (07 Oct 2020 06:29) (110/68 - 149/68)  BP(mean): --  ABP: --  ABP(mean): --  RR: 18 (07 Oct 2020 06:29) (18 - 22)  SpO2: 97% (07 Oct 2020 08:09) (90% - 98%)    Vital Signs Last 24 Hrs  T(C): 36.4 (07 Oct 2020 06:29), Max: 36.7 (06 Oct 2020 09:27)  T(F): 97.5 (07 Oct 2020 06:29), Max: 98.1 (06 Oct 2020 12:34)  HR: 84 (07 Oct 2020 08:09) (54 - 100)  BP: 110/68 (07 Oct 2020 06:29) (110/68 - 149/68)  BP(mean): --  RR: 18 (07 Oct 2020 06:29) (18 - 22)  SpO2: 97% (07 Oct 2020 08:09) (90% - 98%)        I&O's Detail        LABS:                        10.3   8.87  )-----------( 513      ( 07 Oct 2020 06:55 )             33.0     10-07    131<L>  |  86<L>  |  32<H>  ----------------------------<  230<H>  3.6   |  36<H>  |  1.40<H>    Ca    9.6      07 Oct 2020 06:55  Phos  4.7     10-07  Mg     1.5     10-07    TPro  7.8  /  Alb  2.6<L>  /  TBili  0.3  /  DBili  x   /  AST  19  /  ALT  13  /  AlkPhos  88  10-07          CAPILLARY BLOOD GLUCOSE      POCT Blood Glucose.: 269 mg/dL (07 Oct 2020 08:18)        CULTURES:      Physical Examination:    General: mild sob.    HEENT: Pupils equal, reactive to light.  Symmetric.    PULM: decreased bs few rhonchi, no change percussion no rales, wheeze    CVS: Regular rate and rhythm, no murmurs, rubs, or gallops    ABD: Soft, nondistended, nontender, normoactive bowel sounds, no masses    EXT: No edema, nontender    SKIN: Warm and well perfused, no rashes noted.    NEURO: Alert, oriented, interactive, nonfocal    RADIOLOGY: ***d< from: CT Chest No Cont (10.06.20 @ 04:52) >  EXAM:  CT CHEST                            PROCEDURE DATE:  10/06/2020          INTERPRETATION:  CLINICAL INFORMATION: Rule out pneumonia. Chronic obstructive pulmonary disease.    COMPARISON: Same day radiograph    PROCEDURE:  CT of the Chest was performed without intravenous contrast.  Sagittal and coronal reformats were performed. 3-D MIPS images were acquired on the axial plane.    FINDINGS:    LUNGS AND AIRWAYS: Patent central airways.  Centrilobular emphysema in both lungs. Numerous nodular densities with septal thickening showing tree-in-bud appearance in the right upper lobe, right middle lobe, left upper lobe and left lower lobe. There is no atelectasis.  PLEURA: No pleural effusion.  MEDIASTINUM AND LILIANA: 1.4 cm sized right paratracheal lymph node. A few subcentimeter mediastinal lymph nodes.  VESSELS: Moderate atherosclerotic calcification.  HEART: Heart size is normal. No pericardial effusion.  CHEST WALL AND LOWER NECK: Within normal limits.  VISUALIZED UPPER ABDOMEN: Within normal limits.  BONES: Degenerative change.    IMPRESSION:  Multifocal pneumonia involving both lungs as described.  Reactive mediastinal lymphadenopathy.              ROBBIE LAEL M.D., ATTENDING RADIOLOGIST  This document has been electronically signed. Oct  6 2020  5:12AM    < end of copied text >      CRITICAL CARE TIME SPENT: ***

## 2020-10-07 NOTE — PROGRESS NOTE ADULT - PROBLEM SELECTOR PLAN 9
on oxycodone and tylenol PRN at rehab for chronic pain, continue on oxycodone and tylenol PRN at rehab for chronic pain, continue  -apprec pallaiteve care colaboration

## 2020-10-07 NOTE — PROGRESS NOTE ADULT - PROBLEM SELECTOR PLAN 3
on admission BUN/Cr: 16/1.00; BUN/Cr today 32/1.40; etiology possibly from polypharmacy   - bumetanide 1mg BID at rehab - held b/c of AURORA  - trend renal indices  - avoid nephrotoxic meds   - renally dose meds on admission BUN/Cr: 16/1.00; BUN/Cr today 32/1.40; etiology likely 2/2 to diuretics  - on bumetanide 1mg BID  - will hold b/c of AURORA  - trend renal indices  - avoid nephrotoxic meds   - renally dose meds

## 2020-10-07 NOTE — PROGRESS NOTE ADULT - PROBLEM SELECTOR PROBLEM 1
Acute respiratory failure with hypoxia and hypercapnia Acute on chronic respiratory failure with hypoxia and hypercapnia

## 2020-10-07 NOTE — PROGRESS NOTE ADULT - PROBLEM SELECTOR PLAN 2
on 3LNC at rehab, currently requiring 5L nc  -on spiriva, duoneb, theophylline, symbicort, montelukast, alb inhaler PRN at rehab, continue  -changed theophylline from 300mg to 400mg (takes 400mg at home as per pt)  - BIPAP qhs and PRN on 3LNC at rehab, currently requiring 5L nc with BIPAP   -on spiriva, duoneb, theophylline, symbicort, montelukast, alb inhaler PRN at rehab, continue  -changed theophylline from 300mg to 400mg (takes 400mg at home as per pt)  - BIPAP qhs and PRN on 3LNC at rehab, currently requiring 5L nc with BIPAP, wean as tolerated  -on spiriva, xopenex, symbicort, montelukast,  inhaler PRN at rehab, continue  -hold theophylline given tachycardia and brief afib  -restart once better rate controlled  - BIPAP qhs and PRN  -of note pt is not on transplant list yet, is planning to go on list at [facility] if medically stable and improved

## 2020-10-07 NOTE — PROGRESS NOTE ADULT - SUBJECTIVE AND OBJECTIVE BOX
Patient is a 60y old  Female who presents with a chief complaint of COPD exacerbation, PNA (07 Oct 2020 11:17)      INTERVAL HPI/OVERNIGHT EVENTS: Patient seen and examined at bedside. Patient was anxious as she states that she does not feel like she is improving. She is still having "shortness of breath even with antibiotics". During the encounter, pt switched herself from BIPAP to NC and states that she only uses BIPAP prn; was talking comfortably with no visible SOB. She also stated that she takes Theophylline 400mg not 300mg at home. Patient was informed of her AURORA and was very worried about her kidneys. She stated that she wants all of her medical conditions to be optimized as she is trying to get onto the lung transplant list at [facility]. Spoke with Cardio and was informed that she had an episode A.fib which resolved into sinus tachycardia. Denies fevers, chills, headache, lightheadedness, chest pain, abdominal pain, n/v/d/c. Last bowel movement was yesterday.    MEDICATIONS  (STANDING):  apixaban 5 milliGRAM(s) Oral every 12 hours  ascorbic acid 500 milliGRAM(s) Oral daily  aspirin  chewable 81 milliGRAM(s) Oral daily  atorvastatin 80 milliGRAM(s) Oral at bedtime  azithromycin  IVPB 500 milliGRAM(s) IV Intermittent every 24 hours  budesonide 160 MICROgram(s)/formoterol 4.5 MICROgram(s) Inhaler 2 Puff(s) Inhalation two times a day  buMETAnide 1 milliGRAM(s) Oral two times a day  cholecalciferol 1000 Unit(s) Oral daily  dextrose 5%. 1000 milliLiter(s) (50 mL/Hr) IV Continuous <Continuous>  dextrose 50% Injectable 12.5 Gram(s) IV Push once  dextrose 50% Injectable 25 Gram(s) IV Push once  dextrose 50% Injectable 25 Gram(s) IV Push once  diltiazem    milliGRAM(s) Oral daily  ferrous    sulfate 325 milliGRAM(s) Oral daily  insulin glargine Injectable (LANTUS) 10 Unit(s) SubCutaneous at bedtime  insulin lispro (HumaLOG) corrective regimen sliding scale   SubCutaneous three times a day before meals  methylPREDNISolone sodium succinate Injectable 40 milliGRAM(s) IV Push daily  montelukast 10 milliGRAM(s) Oral at bedtime  multivitamin 1 Tablet(s) Oral daily  pantoprazole    Tablet 40 milliGRAM(s) Oral before breakfast  piperacillin/tazobactam IVPB.. 3.375 Gram(s) IV Intermittent every 8 hours  polyethylene glycol 3350 17 Gram(s) Oral daily  senna 2 Tablet(s) Oral at bedtime  theophylline ER Capsule 100 milliGRAM(s) Oral once  tiotropium 18 MICROgram(s) Capsule 1 Capsule(s) Inhalation daily    MEDICATIONS  (PRN):  acetaminophen   Tablet .. 650 milliGRAM(s) Oral every 6 hours PRN Mild Pain (1 - 3)  albuterol/ipratropium for Nebulization 3 milliLiter(s) Nebulizer every 3 hours PRN Shortness of Breath and/or Wheezing  bisacodyl Suppository 10 milliGRAM(s) Rectal daily PRN Constipation  dextrose 40% Gel 15 Gram(s) Oral once PRN Blood Glucose LESS THAN 70 milliGRAM(s)/deciliter  glucagon  Injectable 1 milliGRAM(s) IntraMuscular once PRN Glucose LESS THAN 70 milligrams/deciliter  guaiFENesin   Syrup  (Sugar-Free) 100 milliGRAM(s) Oral every 6 hours PRN Cough  lactulose Syrup 15 Gram(s) Oral two times a day PRN constipation  oxyCODONE    IR 5 milliGRAM(s) Oral every 6 hours PRN Moderate Pain (4 - 6)      Allergies    No Known Allergies    Intolerances    albuterol (Unknown)      REVIEW OF SYSTEMS:  CONSTITUTIONAL: No fever or chills  HEENT:  No headache, no sore throat  RESPIRATORY: +Shortness of breath; no cough, wheezing  CARDIOVASCULAR: No chest pain, palpitations  GASTROINTESTINAL: No abd pain, nausea, vomiting, or diarrhea  GENITOURINARY: No dysuria, frequency, or hematuria  NEUROLOGICAL: no focal weakness or dizziness  MUSCULOSKELETAL: no myalgias     Vital Signs Last 24 Hrs  T(C): 36.6 (07 Oct 2020 12:44), Max: 36.6 (07 Oct 2020 12:44)  T(F): 97.8 (07 Oct 2020 12:44), Max: 97.8 (07 Oct 2020 12:44)  HR: 90 (07 Oct 2020 12:44) (54 - 100)  BP: 115/72 (07 Oct 2020 12:44) (110/68 - 127/71)  BP(mean): --  RR: 18 (07 Oct 2020 12:44) (18 - 22)  SpO2: 97% (07 Oct 2020 12:44) (90% - 98%)    PHYSICAL EXAM:  GENERAL: NAD. anxious  HEENT:  anicteric, EOMI b/l   CHEST/LUNG:  decreased breath sounds b/l, no rales, wheezes, or rhonchi  HEART:  RRR, S1, S2; no murmurs, rubs or gallops   ABDOMEN:  BS+, soft, nontender, nondistended  EXTREMITIES: no edema LE, cyanosis, or calf tenderness  NERVOUS SYSTEM: answers questions and follows commands appropriately    LABS:                        10.3   8.87  )-----------( 513      ( 07 Oct 2020 06:55 )             33.0     CBC Full  -  ( 07 Oct 2020 06:55 )  WBC Count : 8.87 K/uL  Hemoglobin : 10.3 g/dL  Hematocrit : 33.0 %  Platelet Count - Automated : 513 K/uL  Mean Cell Volume : 76.4 fl  Mean Cell Hemoglobin : 23.8 pg  Mean Cell Hemoglobin Concentration : 31.2 gm/dL  Auto Neutrophil # : 7.93 K/uL  Auto Lymphocyte # : 0.52 K/uL  Auto Monocyte # : 0.36 K/uL  Auto Eosinophil # : 0.00 K/uL  Auto Basophil # : 0.01 K/uL  Auto Neutrophil % : 89.3 %  Auto Lymphocyte % : 5.9 %  Auto Monocyte % : 4.1 %  Auto Eosinophil % : 0.0 %  Auto Basophil % : 0.1 %    07 Oct 2020 06:55    131    |  86     |  32     ----------------------------<  230    3.6     |  36     |  1.40     Ca    9.6        07 Oct 2020 06:55  Phos  4.7       07 Oct 2020 06:55  Mg     1.5       07 Oct 2020 06:55    TPro  7.8    /  Alb  2.6    /  TBili  0.3    /  DBili  x      /  AST  19     /  ALT  13     /  AlkPhos  88     07 Oct 2020 06:55        CAPILLARY BLOOD GLUCOSE      POCT Blood Glucose.: 353 mg/dL (07 Oct 2020 12:21)  POCT Blood Glucose.: 269 mg/dL (07 Oct 2020 08:18)  POCT Blood Glucose.: 216 mg/dL (06 Oct 2020 22:43)  POCT Blood Glucose.: 273 mg/dL (06 Oct 2020 17:15)        Culture - Blood (collected 10-06-20 @ 09:23)  Source: .Blood Blood  Preliminary Report (10-07-20 @ 10:01):    No growth to date.    Culture - Blood (collected 10-06-20 @ 09:23)  Source: .Blood Blood  Preliminary Report (10-07-20 @ 10:01):    No growth to date.      Consultant(s) Notes Reviewed:  [x] YES  [ ] NO     Patient is a 60y old  Female who presents with a chief complaint of COPD exacerbation, PNA (07 Oct 2020 11:17)      INTERVAL HPI/OVERNIGHT EVENTS: Patient seen and examined at bedside. Patient was anxious as she states that she does not feel like she is improving. She is still having "shortness of breath even with antibiotics". During the encounter, pt switched herself from BIPAP to NC and states that she only uses BIPAP prn; was talking comfortably with no visible SOB. She also stated that she takes Theophylline 400mg not 300mg at home. Patient was informed of her AURORA and was very worried about her kidneys. She stated that she wants all of her medical conditions to be optimized as she is trying to get onto the lung transplant list at [facility]. Says she is not actively on the transplant list. Per tele monitor, pt went from rapid afib to sinus tachycardia. Cardio consulted. Pal care consult. Denies fevers, chills, headache, lightheadedness, chest pain, abdominal pain, n/v/d/c. Last bowel movement was yesterday.    MEDICATIONS  (STANDING):  apixaban 5 milliGRAM(s) Oral every 12 hours  ascorbic acid 500 milliGRAM(s) Oral daily  aspirin  chewable 81 milliGRAM(s) Oral daily  atorvastatin 80 milliGRAM(s) Oral at bedtime  azithromycin  IVPB 500 milliGRAM(s) IV Intermittent every 24 hours  budesonide 160 MICROgram(s)/formoterol 4.5 MICROgram(s) Inhaler 2 Puff(s) Inhalation two times a day  buMETAnide 1 milliGRAM(s) Oral two times a day  cholecalciferol 1000 Unit(s) Oral daily  dextrose 5%. 1000 milliLiter(s) (50 mL/Hr) IV Continuous <Continuous>  dextrose 50% Injectable 12.5 Gram(s) IV Push once  dextrose 50% Injectable 25 Gram(s) IV Push once  dextrose 50% Injectable 25 Gram(s) IV Push once  diltiazem    milliGRAM(s) Oral daily  ferrous    sulfate 325 milliGRAM(s) Oral daily  insulin glargine Injectable (LANTUS) 10 Unit(s) SubCutaneous at bedtime  insulin lispro (HumaLOG) corrective regimen sliding scale   SubCutaneous three times a day before meals  methylPREDNISolone sodium succinate Injectable 40 milliGRAM(s) IV Push daily  montelukast 10 milliGRAM(s) Oral at bedtime  multivitamin 1 Tablet(s) Oral daily  pantoprazole    Tablet 40 milliGRAM(s) Oral before breakfast  piperacillin/tazobactam IVPB.. 3.375 Gram(s) IV Intermittent every 8 hours  polyethylene glycol 3350 17 Gram(s) Oral daily  senna 2 Tablet(s) Oral at bedtime  theophylline ER Capsule 100 milliGRAM(s) Oral once  tiotropium 18 MICROgram(s) Capsule 1 Capsule(s) Inhalation daily    MEDICATIONS  (PRN):  acetaminophen   Tablet .. 650 milliGRAM(s) Oral every 6 hours PRN Mild Pain (1 - 3)  albuterol/ipratropium for Nebulization 3 milliLiter(s) Nebulizer every 3 hours PRN Shortness of Breath and/or Wheezing  bisacodyl Suppository 10 milliGRAM(s) Rectal daily PRN Constipation  dextrose 40% Gel 15 Gram(s) Oral once PRN Blood Glucose LESS THAN 70 milliGRAM(s)/deciliter  glucagon  Injectable 1 milliGRAM(s) IntraMuscular once PRN Glucose LESS THAN 70 milligrams/deciliter  guaiFENesin   Syrup  (Sugar-Free) 100 milliGRAM(s) Oral every 6 hours PRN Cough  lactulose Syrup 15 Gram(s) Oral two times a day PRN constipation  oxyCODONE    IR 5 milliGRAM(s) Oral every 6 hours PRN Moderate Pain (4 - 6)      Allergies    No Known Allergies    Intolerances    albuterol (Unknown)      REVIEW OF SYSTEMS:  CONSTITUTIONAL: No fever or chills  HEENT:  No headache, no sore throat  RESPIRATORY: +Shortness of breath; no cough, wheezing  CARDIOVASCULAR: No chest pain, palpitations  GASTROINTESTINAL: No abd pain, nausea, vomiting, or diarrhea  GENITOURINARY: No dysuria, frequency, or hematuria  NEUROLOGICAL: no focal weakness or dizziness  MUSCULOSKELETAL: no myalgias     Vital Signs Last 24 Hrs  T(C): 36.6 (07 Oct 2020 12:44), Max: 36.6 (07 Oct 2020 12:44)  T(F): 97.8 (07 Oct 2020 12:44), Max: 97.8 (07 Oct 2020 12:44)  HR: 90 (07 Oct 2020 12:44) (54 - 100)  BP: 115/72 (07 Oct 2020 12:44) (110/68 - 127/71)  BP(mean): --  RR: 18 (07 Oct 2020 12:44) (18 - 22)  SpO2: 97% (07 Oct 2020 12:44) (90% - 98%)    PHYSICAL EXAM:  GENERAL: NAD. anxious appearing  HEENT:  anicteric, EOMI b/l   CHEST/LUNG:  decreased breath sounds b/l, no rales, wheezes, or rhonchi  HEART:  RRR, S1, S2; no murmurs, rubs or gallops   ABDOMEN:  BS+, soft, nontender, nondistended  EXTREMITIES: no edema LE, cyanosis, or calf tenderness  NERVOUS SYSTEM: answers questions and follows commands appropriately    LABS:                        10.3   8.87  )-----------( 513      ( 07 Oct 2020 06:55 )             33.0     CBC Full  -  ( 07 Oct 2020 06:55 )  WBC Count : 8.87 K/uL  Hemoglobin : 10.3 g/dL  Hematocrit : 33.0 %  Platelet Count - Automated : 513 K/uL  Mean Cell Volume : 76.4 fl  Mean Cell Hemoglobin : 23.8 pg  Mean Cell Hemoglobin Concentration : 31.2 gm/dL  Auto Neutrophil # : 7.93 K/uL  Auto Lymphocyte # : 0.52 K/uL  Auto Monocyte # : 0.36 K/uL  Auto Eosinophil # : 0.00 K/uL  Auto Basophil # : 0.01 K/uL  Auto Neutrophil % : 89.3 %  Auto Lymphocyte % : 5.9 %  Auto Monocyte % : 4.1 %  Auto Eosinophil % : 0.0 %  Auto Basophil % : 0.1 %    07 Oct 2020 06:55    131    |  86     |  32     ----------------------------<  230    3.6     |  36     |  1.40     Ca    9.6        07 Oct 2020 06:55  Phos  4.7       07 Oct 2020 06:55  Mg     1.5       07 Oct 2020 06:55    TPro  7.8    /  Alb  2.6    /  TBili  0.3    /  DBili  x      /  AST  19     /  ALT  13     /  AlkPhos  88     07 Oct 2020 06:55        CAPILLARY BLOOD GLUCOSE      POCT Blood Glucose.: 353 mg/dL (07 Oct 2020 12:21)  POCT Blood Glucose.: 269 mg/dL (07 Oct 2020 08:18)  POCT Blood Glucose.: 216 mg/dL (06 Oct 2020 22:43)  POCT Blood Glucose.: 273 mg/dL (06 Oct 2020 17:15)        Culture - Blood (collected 10-06-20 @ 09:23)  Source: .Blood Blood  Preliminary Report (10-07-20 @ 10:01):    No growth to date.    Culture - Blood (collected 10-06-20 @ 09:23)  Source: .Blood Blood  Preliminary Report (10-07-20 @ 10:01):    No growth to date.      Consultant(s) Notes Reviewed:  [x] YES  [ ] NO     Patient is a 60y old  Female who presents with a chief complaint of COPD exacerbation, PNA (07 Oct 2020 11:17)      INTERVAL HPI/OVERNIGHT EVENTS: Patient seen and examined at bedside. Patient was anxious as she states that she does not feel like she is improving. She is still having "shortness of breath even with antibiotics". During the encounter, pt switched herself from BIPAP to NC and states that she only uses BIPAP prn; was talking comfortably with no visible SOB. She also stated that she takes Theophylline 400mg not 300mg at home. Patient was informed of her AURORA and was very worried about her kidneys. She stated that she wants all of her medical conditions to be optimized as she is trying to get onto the lung transplant list at [facility]. Says she is not actively on the transplant list. Per tele monitor, pt went from rapid afib to sinus tachycardia. Cardio consulted. Pal care consult. Denies fevers, chills, headache, lightheadedness, chest pain, abdominal pain, n/v/d/c. Last bowel movement was yesterday.    MEDICATIONS  (STANDING):  apixaban 5 milliGRAM(s) Oral every 12 hours  ascorbic acid 500 milliGRAM(s) Oral daily  aspirin  chewable 81 milliGRAM(s) Oral daily  atorvastatin 80 milliGRAM(s) Oral at bedtime  azithromycin  IVPB 500 milliGRAM(s) IV Intermittent every 24 hours  budesonide 160 MICROgram(s)/formoterol 4.5 MICROgram(s) Inhaler 2 Puff(s) Inhalation two times a day  buMETAnide 1 milliGRAM(s) Oral two times a day  cholecalciferol 1000 Unit(s) Oral daily  dextrose 5%. 1000 milliLiter(s) (50 mL/Hr) IV Continuous <Continuous>  dextrose 50% Injectable 12.5 Gram(s) IV Push once  dextrose 50% Injectable 25 Gram(s) IV Push once  dextrose 50% Injectable 25 Gram(s) IV Push once  diltiazem    milliGRAM(s) Oral daily  ferrous    sulfate 325 milliGRAM(s) Oral daily  insulin glargine Injectable (LANTUS) 10 Unit(s) SubCutaneous at bedtime  insulin lispro (HumaLOG) corrective regimen sliding scale   SubCutaneous three times a day before meals  methylPREDNISolone sodium succinate Injectable 40 milliGRAM(s) IV Push daily  montelukast 10 milliGRAM(s) Oral at bedtime  multivitamin 1 Tablet(s) Oral daily  pantoprazole    Tablet 40 milliGRAM(s) Oral before breakfast  piperacillin/tazobactam IVPB.. 3.375 Gram(s) IV Intermittent every 8 hours  polyethylene glycol 3350 17 Gram(s) Oral daily  senna 2 Tablet(s) Oral at bedtime  theophylline ER Capsule 100 milliGRAM(s) Oral once  tiotropium 18 MICROgram(s) Capsule 1 Capsule(s) Inhalation daily    MEDICATIONS  (PRN):  acetaminophen   Tablet .. 650 milliGRAM(s) Oral every 6 hours PRN Mild Pain (1 - 3)  albuterol/ipratropium for Nebulization 3 milliLiter(s) Nebulizer every 3 hours PRN Shortness of Breath and/or Wheezing  bisacodyl Suppository 10 milliGRAM(s) Rectal daily PRN Constipation  dextrose 40% Gel 15 Gram(s) Oral once PRN Blood Glucose LESS THAN 70 milliGRAM(s)/deciliter  glucagon  Injectable 1 milliGRAM(s) IntraMuscular once PRN Glucose LESS THAN 70 milligrams/deciliter  guaiFENesin   Syrup  (Sugar-Free) 100 milliGRAM(s) Oral every 6 hours PRN Cough  lactulose Syrup 15 Gram(s) Oral two times a day PRN constipation  oxyCODONE    IR 5 milliGRAM(s) Oral every 6 hours PRN Moderate Pain (4 - 6)      Allergies    No Known Allergies    Intolerances    albuterol (Unknown)      REVIEW OF SYSTEMS:  CONSTITUTIONAL: No fever or chills  HEENT:  No headache, no sore throat  RESPIRATORY: +Shortness of breath; no cough, wheezing  CARDIOVASCULAR: No chest pain, palpitations  GASTROINTESTINAL: No abd pain, nausea, vomiting, or diarrhea  GENITOURINARY: No dysuria, frequency, or hematuria  NEUROLOGICAL: no focal weakness or dizziness  MUSCULOSKELETAL: no myalgias     Vital Signs Last 24 Hrs  T(C): 36.6 (07 Oct 2020 12:44), Max: 36.6 (07 Oct 2020 12:44)  T(F): 97.8 (07 Oct 2020 12:44), Max: 97.8 (07 Oct 2020 12:44)  HR: 90 (07 Oct 2020 12:44) (54 - 100)  BP: 115/72 (07 Oct 2020 12:44) (110/68 - 127/71)  BP(mean): --  RR: 18 (07 Oct 2020 12:44) (18 - 22)  SpO2: 97% (07 Oct 2020 12:44) (90% - 98%)    PHYSICAL EXAM:  GENERAL: NAD. anxious appearing  HEENT:  anicteric, EOMI b/l   CHEST/LUNG:  decreased breath sounds b/l, no rales, wheezes, or rhonchi  HEART:  RRR, S1, S2; no murmurs, rubs or gallops   ABDOMEN:  BS+, soft, nontender, nondistended  EXTREMITIES: no edema LE, cyanosis, or calf tenderness  NERVOUS SYSTEM: answers questions and follows commands appropriately    LABS:                        10.3   8.87  )-----------( 513      ( 07 Oct 2020 06:55 )             33.0     CBC Full  -  ( 07 Oct 2020 06:55 )  WBC Count : 8.87 K/uL  Hemoglobin : 10.3 g/dL  Hematocrit : 33.0 %  Platelet Count - Automated : 513 K/uL  Mean Cell Volume : 76.4 fl  Mean Cell Hemoglobin : 23.8 pg  Mean Cell Hemoglobin Concentration : 31.2 gm/dL  Auto Neutrophil # : 7.93 K/uL  Auto Lymphocyte # : 0.52 K/uL  Auto Monocyte # : 0.36 K/uL  Auto Eosinophil # : 0.00 K/uL  Auto Basophil # : 0.01 K/uL  Auto Neutrophil % : 89.3 %  Auto Lymphocyte % : 5.9 %  Auto Monocyte % : 4.1 %  Auto Eosinophil % : 0.0 %  Auto Basophil % : 0.1 %    07 Oct 2020 06:55    131    |  86     |  32     ----------------------------<  230    3.6     |  36     |  1.40     Ca    9.6        07 Oct 2020 06:55  Phos  4.7       07 Oct 2020 06:55  Mg     1.5       07 Oct 2020 06:55    TPro  7.8    /  Alb  2.6    /  TBili  0.3    /  DBili  x      /  AST  19     /  ALT  13     /  AlkPhos  88     07 Oct 2020 06:55        CAPILLARY BLOOD GLUCOSE      POCT Blood Glucose.: 353 mg/dL (07 Oct 2020 12:21)  POCT Blood Glucose.: 269 mg/dL (07 Oct 2020 08:18)  POCT Blood Glucose.: 216 mg/dL (06 Oct 2020 22:43)  POCT Blood Glucose.: 273 mg/dL (06 Oct 2020 17:15)        Culture - Blood (collected 10-06-20 @ 09:23)  Source: .Blood Blood  Preliminary Report (10-07-20 @ 10:01):    No growth to date.    Culture - Blood (collected 10-06-20 @ 09:23)  Source: .Blood Blood  Preliminary Report (10-07-20 @ 10:01):    No growth to date.      Consultant(s) Notes Reviewed:  [x] YES  [ ] NO

## 2020-10-07 NOTE — PROGRESS NOTE ADULT - PROBLEM SELECTOR PLAN 7
EKG showing sinus tachycardia  - on eliquis 5mg bid and cardizem 180mg qd at rehab, continue  - spoke w/ cardio, had an episode of a.fib which resolved into sinus tachycardia EKG showing sinus tachycardia  - on eliquis 5mg bid and cardizem 180mg qd at rehab, continue  - Per tele -  pt had went into afib and converted into sinus tachycardia  - Cardio consulted, f/u recs EKG showing sinus tachycardia  - on eliquis 5mg bid and cardizem 180mg qd at rehab, continue  - Per tele -  pt had went into afib and converted into sinus tachycardia, hold theophylline and monitor on remote, consider tele upgrade if continues arrythmic patter, apprec pharmD Dr Ackerman collaboration in therapeutic management and discussion/education with patient  - Cardio consulted, f/u recs

## 2020-10-07 NOTE — PROGRESS NOTE ADULT - ATTENDING COMMENTS
60F w/ end stage COPD on 3LNC (trying to get on  transplant list at [facility]), former smoker, CAD s/p stent (2016), afib on eliquis, uncontrolled DM2 (on insulin), raynaud phenomenon and recent hospitalization at [facility] for confusion and AURORA p/w sob, admitted for acute on chronic resp failure with hypoxia and hyercarbia sec to multifocal PNA and COPD exacerabtion with end stage COPD. Plan: apprec pulm, palliative, pharmD and cardio collaboration in care, monitor clinical course, PT eval apprec, SW/CM collaboration in anticipated DC needs apprec, cont iv antibc, apprec ID recs

## 2020-10-07 NOTE — PROGRESS NOTE ADULT - PROBLEM SELECTOR PLAN 1
likely multifactorial from multifocal PNA and COPD exacerbation  - CXR: b/l lung infiltrates; emphysema   - CT chest done showing multifocal PNA  - VB.34/68/>40/31  - continue solumedrol 40 mg IV daily  - duonebs q6h standing with albuterol inh q4h PRN  - cont COPD medications from rehab  - continue zithromax 500mg qd; start cefepime 2gm q12  - currently on 5LNC, maintain O2 >/ 88  - f/u strep pneumo and legionella antigen  - TTE (10/6/20) LVEF of 55% - no vegetations appreciated   - pulm Dr. Rojas consulted, recs appreciated   - ID (Dr Juarez) recs: Blood culture NGTD; f/u sputum culture, Legionella U ag; stop zosyn not suspecting anaerobes; switch to cefepime 2gm q12 (creat 1.4); continue azithromycin 500mg daily likely multifactorial from multifocal PNA and COPD exacerbation  - CXR: b/l lung infiltrates; emphysema   - CT chest done showing multifocal PNA  - VB.34/68/>40/31  - continue solumedrol 40 mg IV daily  - duonebs q6h standing with albuterol inh q4h PRN  - cont COPD medications from rehab  - ID consulted (Dr. Zhu) recs appreciated, - Blood culture NGTD; f/u sputum culture, Legionella U ag; stop zosyn not suspecting anaerobes; switch to cefepime 2gm q12 (creat 1.4); continue azithromycin 500mg daily  - currently on 5LNC, maintain O2 >/ 88  - f/u strep pneumo and legionella antigen  - TTE (10/6/20) LVEF of 55% - no vegetations appreciated   - pulm Dr. Rojas consulted, recs appreciated - Bipap, steroids, inhalers likely multifactorial from multifocal PNA and COPD exacerbation  - CXR: b/l lung infiltrates; emphysema   - CT chest done showing multifocal PNA  - VB.34/68/>40/31  - continue solumedrol 40 mg IV daily  - duonebs q6h standing with albuterol inh q4h PRN  - cont COPD medications from rehab  - ID consulted (Dr. Zhu) recs appreciated, - Blood culture NGTD; f/u sputum culture, Legionella U ag; stop zosyn not suspecting anaerobes; switch to cefepime 2gm q12 (creat 1.4); continue azithromycin 500mg daily  - currently on 5LNC, maintain O2 >/ 88  - f/u strep pneumo and legionella antigen  - TTE (10/6/20) LVEF of 55% - no vegetations appreciated   - pulm Dr. Rojas consulted, recs appreciated - Bipap, steroids, inhalers  -pt refusing  treatment options i.e. steroids; apprec palliative care support in management due to complexity of care and nature of history

## 2020-10-07 NOTE — PROGRESS NOTE ADULT - PROBLEM SELECTOR PLAN 6
recent TTE in 08/2020 showing normal LVEF, stage 1 diastolic dysfunction  -bumetanide 1mg BID at rehab - held b/c of AURORA   -TTE (10/6/20) LVEF of 55%   - caution with excessive IVF recent TTE in 08/2020 showing normal LVEF, stage 1 diastolic dysfunction  -bumetanide 1mg BID at rehab - hold b/c of AURORA   -TTE (10/6/20) LVEF of 55%   - caution with excessive IVF

## 2020-10-07 NOTE — CONSULT NOTE ADULT - PROBLEM SELECTOR RECOMMENDATION 9
copd ex - end stage Emphysema - possible PNA - anxiety - AF   on AC for AF  COPD - Spiriva - Symbicort - Duoneb PRN for sob and or wheezing - Systemic Steroids - will check Blood Gas - Biomarkers - eg. CRP -   sx management - Robitussin PRN - may need Anxiolytics  BIPAP for night time and prn use - end stage COPD  o2 support - keep sat > 88 pct  [facility] rx regimen  will check Theophylline serum level  emotional support  old records reviewed  on emp ABX regimen for poss Lower resp tract infection - CT chest reviewed - broad differential - at risk for ALVIN - advanced COPD - may benefit from ID eval  sputum cx
Bipap hs  Steroids, hhn

## 2020-10-07 NOTE — PROGRESS NOTE ADULT - PROBLEM SELECTOR PLAN 10
On Eliquis 5mg bid, no need for further pharmacologic DVT ppx    11. Hypomagnesemia   - Magnesium IVPB 1g given   - f/u on magnesium in AM

## 2020-10-07 NOTE — PROGRESS NOTE ADULT - PROBLEM SELECTOR PLAN 8
chronic, on multiple medications from rehab but patient states she only takes sennacot for constipation  - had a small bowel movement yesterday   - cont senna and miralax standing  - lactulose and dulcolax suppository PRN for constipation chronic, resolved with BM yesterday   - cont senna and miralax standing  - lactulose and dulcolax suppository PRN for constipation

## 2020-10-08 LAB
ALBUMIN SERPL ELPH-MCNC: 2.6 G/DL — LOW (ref 3.3–5)
ALP SERPL-CCNC: 83 U/L — SIGNIFICANT CHANGE UP (ref 40–120)
ALT FLD-CCNC: 13 U/L — SIGNIFICANT CHANGE UP (ref 12–78)
ANION GAP SERPL CALC-SCNC: 8 MMOL/L — SIGNIFICANT CHANGE UP (ref 5–17)
APPEARANCE UR: CLEAR — SIGNIFICANT CHANGE UP
AST SERPL-CCNC: 20 U/L — SIGNIFICANT CHANGE UP (ref 15–37)
BACTERIA # UR AUTO: ABNORMAL
BASOPHILS # BLD AUTO: 0.01 K/UL — SIGNIFICANT CHANGE UP (ref 0–0.2)
BASOPHILS NFR BLD AUTO: 0.1 % — SIGNIFICANT CHANGE UP (ref 0–2)
BILIRUB SERPL-MCNC: 0.3 MG/DL — SIGNIFICANT CHANGE UP (ref 0.2–1.2)
BILIRUB UR-MCNC: NEGATIVE — SIGNIFICANT CHANGE UP
BUN SERPL-MCNC: 39 MG/DL — HIGH (ref 7–23)
CALCIUM SERPL-MCNC: 9.6 MG/DL — SIGNIFICANT CHANGE UP (ref 8.5–10.1)
CHLORIDE SERPL-SCNC: 91 MMOL/L — LOW (ref 96–108)
CO2 SERPL-SCNC: 34 MMOL/L — HIGH (ref 22–31)
COLOR SPEC: YELLOW — SIGNIFICANT CHANGE UP
CREAT SERPL-MCNC: 1.5 MG/DL — HIGH (ref 0.5–1.3)
DIFF PNL FLD: NEGATIVE — SIGNIFICANT CHANGE UP
EOSINOPHIL # BLD AUTO: 0 K/UL — SIGNIFICANT CHANGE UP (ref 0–0.5)
EOSINOPHIL NFR BLD AUTO: 0 % — SIGNIFICANT CHANGE UP (ref 0–6)
EPI CELLS # UR: SIGNIFICANT CHANGE UP
GLUCOSE SERPL-MCNC: 220 MG/DL — HIGH (ref 70–99)
GLUCOSE UR QL: NEGATIVE — SIGNIFICANT CHANGE UP
HCT VFR BLD CALC: 31.8 % — LOW (ref 34.5–45)
HGB BLD-MCNC: 9.9 G/DL — LOW (ref 11.5–15.5)
IMM GRANULOCYTES NFR BLD AUTO: 0.6 % — SIGNIFICANT CHANGE UP (ref 0–1.5)
KETONES UR-MCNC: ABNORMAL
LEGIONELLA AG UR QL: NEGATIVE — SIGNIFICANT CHANGE UP
LEUKOCYTE ESTERASE UR-ACNC: NEGATIVE — SIGNIFICANT CHANGE UP
LYMPHOCYTES # BLD AUTO: 0.5 K/UL — LOW (ref 1–3.3)
LYMPHOCYTES # BLD AUTO: 3.9 % — LOW (ref 13–44)
MAGNESIUM SERPL-MCNC: 2 MG/DL — SIGNIFICANT CHANGE UP (ref 1.6–2.6)
MCHC RBC-ENTMCNC: 23.7 PG — LOW (ref 27–34)
MCHC RBC-ENTMCNC: 31.1 GM/DL — LOW (ref 32–36)
MCV RBC AUTO: 76.3 FL — LOW (ref 80–100)
MONOCYTES # BLD AUTO: 0.42 K/UL — SIGNIFICANT CHANGE UP (ref 0–0.9)
MONOCYTES NFR BLD AUTO: 3.3 % — SIGNIFICANT CHANGE UP (ref 2–14)
NEUTROPHILS # BLD AUTO: 11.91 K/UL — HIGH (ref 1.8–7.4)
NEUTROPHILS NFR BLD AUTO: 92.1 % — HIGH (ref 43–77)
NITRITE UR-MCNC: NEGATIVE — SIGNIFICANT CHANGE UP
NRBC # BLD: 0 /100 WBCS — SIGNIFICANT CHANGE UP (ref 0–0)
PH UR: 5 — SIGNIFICANT CHANGE UP (ref 5–8)
PLATELET # BLD AUTO: 489 K/UL — HIGH (ref 150–400)
POTASSIUM SERPL-MCNC: 3.2 MMOL/L — LOW (ref 3.5–5.3)
POTASSIUM SERPL-SCNC: 3.2 MMOL/L — LOW (ref 3.5–5.3)
PROT SERPL-MCNC: 7.7 G/DL — SIGNIFICANT CHANGE UP (ref 6–8.3)
PROT UR-MCNC: 30 MG/DL
RBC # BLD: 4.17 M/UL — SIGNIFICANT CHANGE UP (ref 3.8–5.2)
RBC # FLD: 15.9 % — HIGH (ref 10.3–14.5)
RBC CASTS # UR COMP ASSIST: SIGNIFICANT CHANGE UP /HPF (ref 0–4)
SODIUM SERPL-SCNC: 133 MMOL/L — LOW (ref 135–145)
SP GR SPEC: 1.01 — SIGNIFICANT CHANGE UP (ref 1.01–1.02)
UROBILINOGEN FLD QL: NEGATIVE — SIGNIFICANT CHANGE UP
WBC # BLD: 12.92 K/UL — HIGH (ref 3.8–10.5)
WBC # FLD AUTO: 12.92 K/UL — HIGH (ref 3.8–10.5)
WBC UR QL: SIGNIFICANT CHANGE UP

## 2020-10-08 PROCEDURE — 99232 SBSQ HOSP IP/OBS MODERATE 35: CPT

## 2020-10-08 PROCEDURE — 99233 SBSQ HOSP IP/OBS HIGH 50: CPT

## 2020-10-08 PROCEDURE — 99233 SBSQ HOSP IP/OBS HIGH 50: CPT | Mod: GC

## 2020-10-08 PROCEDURE — 71045 X-RAY EXAM CHEST 1 VIEW: CPT | Mod: 26

## 2020-10-08 PROCEDURE — 93010 ELECTROCARDIOGRAM REPORT: CPT

## 2020-10-08 RX ORDER — PANTOPRAZOLE SODIUM 20 MG/1
1 TABLET, DELAYED RELEASE ORAL
Qty: 0 | Refills: 0 | DISCHARGE

## 2020-10-08 RX ORDER — INSULIN LISPRO 100/ML
3 VIAL (ML) SUBCUTANEOUS
Refills: 0 | Status: DISCONTINUED | OUTPATIENT
Start: 2020-10-08 | End: 2020-10-12

## 2020-10-08 RX ORDER — ONDANSETRON 8 MG/1
1 TABLET, FILM COATED ORAL
Qty: 0 | Refills: 0 | DISCHARGE

## 2020-10-08 RX ORDER — METOCLOPRAMIDE HCL 10 MG
1 TABLET ORAL
Qty: 0 | Refills: 0 | DISCHARGE

## 2020-10-08 RX ORDER — APIXABAN 2.5 MG/1
1 TABLET, FILM COATED ORAL
Qty: 0 | Refills: 0 | DISCHARGE

## 2020-10-08 RX ORDER — BUDESONIDE AND FORMOTEROL FUMARATE DIHYDRATE 160; 4.5 UG/1; UG/1
2 AEROSOL RESPIRATORY (INHALATION)
Qty: 0 | Refills: 0 | DISCHARGE

## 2020-10-08 RX ORDER — POLYETHYLENE GLYCOL 3350 17 G/17G
17 POWDER, FOR SOLUTION ORAL
Qty: 0 | Refills: 0 | DISCHARGE

## 2020-10-08 RX ORDER — DILTIAZEM HCL 120 MG
240 CAPSULE, EXT RELEASE 24 HR ORAL DAILY
Refills: 0 | Status: DISCONTINUED | OUTPATIENT
Start: 2020-10-09 | End: 2020-10-24

## 2020-10-08 RX ORDER — ACETAMINOPHEN 500 MG
2 TABLET ORAL
Qty: 0 | Refills: 0 | DISCHARGE

## 2020-10-08 RX ORDER — SENNA PLUS 8.6 MG/1
2 TABLET ORAL
Qty: 0 | Refills: 0 | DISCHARGE

## 2020-10-08 RX ORDER — METFORMIN HYDROCHLORIDE 850 MG/1
1 TABLET ORAL
Qty: 0 | Refills: 0 | DISCHARGE

## 2020-10-08 RX ORDER — CHOLECALCIFEROL (VITAMIN D3) 125 MCG
1 CAPSULE ORAL
Qty: 0 | Refills: 0 | DISCHARGE

## 2020-10-08 RX ORDER — THEOPHYLLINE ANHYDROUS 200 MG
1 TABLET, EXTENDED RELEASE 12 HR ORAL
Qty: 0 | Refills: 0 | DISCHARGE

## 2020-10-08 RX ORDER — FERROUS SULFATE 325(65) MG
1 TABLET ORAL
Qty: 0 | Refills: 0 | DISCHARGE

## 2020-10-08 RX ORDER — INSULIN GLARGINE 100 [IU]/ML
12 INJECTION, SOLUTION SUBCUTANEOUS
Qty: 0 | Refills: 0 | DISCHARGE

## 2020-10-08 RX ORDER — MONTELUKAST 4 MG/1
1 TABLET, CHEWABLE ORAL
Qty: 0 | Refills: 0 | DISCHARGE

## 2020-10-08 RX ORDER — ATORVASTATIN CALCIUM 80 MG/1
1 TABLET, FILM COATED ORAL
Qty: 0 | Refills: 0 | DISCHARGE

## 2020-10-08 RX ORDER — TIOTROPIUM BROMIDE 18 UG/1
1 CAPSULE ORAL; RESPIRATORY (INHALATION)
Qty: 0 | Refills: 0 | DISCHARGE

## 2020-10-08 RX ORDER — ALBUTEROL 90 UG/1
2 AEROSOL, METERED ORAL
Qty: 0 | Refills: 0 | DISCHARGE

## 2020-10-08 RX ORDER — ASPIRIN/CALCIUM CARB/MAGNESIUM 324 MG
1 TABLET ORAL
Qty: 0 | Refills: 0 | DISCHARGE

## 2020-10-08 RX ORDER — LACTULOSE 10 G/15ML
22.5 SOLUTION ORAL
Qty: 0 | Refills: 0 | DISCHARGE

## 2020-10-08 RX ORDER — POTASSIUM CHLORIDE 20 MEQ
40 PACKET (EA) ORAL EVERY 4 HOURS
Refills: 0 | Status: COMPLETED | OUTPATIENT
Start: 2020-10-08 | End: 2020-10-08

## 2020-10-08 RX ADMIN — INSULIN GLARGINE 10 UNIT(S): 100 INJECTION, SOLUTION SUBCUTANEOUS at 21:38

## 2020-10-08 RX ADMIN — Medication 4: at 17:22

## 2020-10-08 RX ADMIN — Medication 40 MILLIGRAM(S): at 05:34

## 2020-10-08 RX ADMIN — Medication 40 MILLIEQUIVALENT(S): at 12:02

## 2020-10-08 RX ADMIN — TIOTROPIUM BROMIDE 1 CAPSULE(S): 18 CAPSULE ORAL; RESPIRATORY (INHALATION) at 21:39

## 2020-10-08 RX ADMIN — Medication 81 MILLIGRAM(S): at 11:27

## 2020-10-08 RX ADMIN — Medication 4: at 08:33

## 2020-10-08 RX ADMIN — AZITHROMYCIN 255 MILLIGRAM(S): 500 TABLET, FILM COATED ORAL at 13:28

## 2020-10-08 RX ADMIN — PANTOPRAZOLE SODIUM 40 MILLIGRAM(S): 20 TABLET, DELAYED RELEASE ORAL at 06:22

## 2020-10-08 RX ADMIN — CEFEPIME 100 MILLIGRAM(S): 1 INJECTION, POWDER, FOR SOLUTION INTRAMUSCULAR; INTRAVENOUS at 17:34

## 2020-10-08 RX ADMIN — BUDESONIDE AND FORMOTEROL FUMARATE DIHYDRATE 2 PUFF(S): 160; 4.5 AEROSOL RESPIRATORY (INHALATION) at 18:22

## 2020-10-08 RX ADMIN — Medication 4: at 12:34

## 2020-10-08 RX ADMIN — Medication 325 MILLIGRAM(S): at 11:27

## 2020-10-08 RX ADMIN — Medication 3 UNIT(S): at 17:22

## 2020-10-08 RX ADMIN — Medication 180 MILLIGRAM(S): at 05:33

## 2020-10-08 RX ADMIN — CEFEPIME 100 MILLIGRAM(S): 1 INJECTION, POWDER, FOR SOLUTION INTRAMUSCULAR; INTRAVENOUS at 05:37

## 2020-10-08 RX ADMIN — LEVALBUTEROL 0.63 MILLIGRAM(S): 1.25 SOLUTION, CONCENTRATE RESPIRATORY (INHALATION) at 22:46

## 2020-10-08 RX ADMIN — Medication 40 MILLIEQUIVALENT(S): at 17:21

## 2020-10-08 RX ADMIN — Medication 500 MILLIGRAM(S): at 11:27

## 2020-10-08 RX ADMIN — Medication 1000 UNIT(S): at 11:27

## 2020-10-08 RX ADMIN — LEVALBUTEROL 0.63 MILLIGRAM(S): 1.25 SOLUTION, CONCENTRATE RESPIRATORY (INHALATION) at 18:26

## 2020-10-08 RX ADMIN — MONTELUKAST 10 MILLIGRAM(S): 4 TABLET, CHEWABLE ORAL at 21:39

## 2020-10-08 RX ADMIN — Medication 100 MILLIGRAM(S): at 17:21

## 2020-10-08 RX ADMIN — Medication 1 TABLET(S): at 11:29

## 2020-10-08 RX ADMIN — APIXABAN 5 MILLIGRAM(S): 2.5 TABLET, FILM COATED ORAL at 17:21

## 2020-10-08 RX ADMIN — SENNA PLUS 2 TABLET(S): 8.6 TABLET ORAL at 21:39

## 2020-10-08 RX ADMIN — APIXABAN 5 MILLIGRAM(S): 2.5 TABLET, FILM COATED ORAL at 05:33

## 2020-10-08 RX ADMIN — ATORVASTATIN CALCIUM 80 MILLIGRAM(S): 80 TABLET, FILM COATED ORAL at 21:39

## 2020-10-08 RX ADMIN — POLYETHYLENE GLYCOL 3350 17 GRAM(S): 17 POWDER, FOR SOLUTION ORAL at 12:02

## 2020-10-08 NOTE — PHYSICAL THERAPY INITIAL EVALUATION ADULT - PERTINENT HX OF CURRENT PROBLEM, REHAB EVAL
61 y/o female adm 10/6 from rehab with SOB. CT chest: + multifocal PNA and COPD exacerbation. Pt is O2 dependent

## 2020-10-08 NOTE — CONSULT NOTE ADULT - ASSESSMENT
[facility]  Speech Language/Pathology  Pediatric Daily Note    Keaton Martinez  2016 2/23/2018      Insurance visits approved: 30    Number of visits:  7 (new year) out of 30  Time in: 11:00  Time out: 11:30   Next Progress Note Due:     Pt being seen for : [x] Speech Therapy        [] Language Therapy              [] Voice Therapy  [] Fluency Therapy   [] Swallowing therapy    Subjective:   Behavior:   [] Motivated         [x] Cooperative  [x]  Pleasant                            [] Uncooperative  [x] Distractible    [] Self-Limiting   [] Other:        Objective/Assessment:   Patient progressing towards goals:         1. Ree Melena the phonological process of fronting to <30% at the word level, given cues as needed. At the word level, Velvet Schwalbe eliminated the phonological process of fronting to 30% accuracy. He was able to lower it further to 10% with max verbal cues and models provided. 2. Derrell will eliminate the phonological process of final consonant deletion to <30% at the word level, given cues as needed. At the word level, Velvet Schwalbe eliminated the phonological process of final consonant deletion to 30% accuracy. With models, he was able to eliminate the process completely. 3. Velvet Schwalbe will produce /k/ in all word positions at the word level with 80% accuracy, independently. Velvet Schwalbe produced /k/ in the initial position of words with 100% accuracy following one model. Velvet Schwalbe produced /k/ in the final position of words with 70% accuracy following one model. He increased to 100% accuracy with max verbal cues and models provided. Velvet Schwalbe produced /k/ in the medial position of words with 80% accuracy following one model. He increased to 100% accuracy with max verbal cues and models provided. 4. Velvet Schwalbe will produce /f/ in all word positions at the word level with 80% accuracy, independently.   Produced /f/ in the initial position of words at the word level with 60% AURORA on CKD 2  Hyponatremia  Hypokalemia  COPD  Hypercalcemia     -BLCr 1  -AURORA unclear etiology, possibly prerenal vs hypoxemic  -Check UA  -Check Ur lytes  -Check UPC  -Check Ur osm  -Corrected calcium 10.2, with paraprotein gap and anemia,  Will check FLC and SPEP  -Check Iron studies  -Bumex on hold, metformin on hold

## 2020-10-08 NOTE — CONSULT NOTE ADULT - SUBJECTIVE AND OBJECTIVE BOX
Patient is a 60y old  Female who presents with a chief complaint of COPD exacerbation, PNA (08 Oct 2020 11:37)       HPI:  60F w/ end stage COPD on 3LNC (pending transplant list at [facility]), former smoker, CAD s/p stent (2016), afib on eliquis, uncontrolled DM2 (on insulin), raynaud phenomenon and recent hospitalization at [facility] for confusion and AURORA p/w sob. Sent from [facility]. Patient states that she has had worsening shortness of breath for past two days. Unable to obtain comprehensive history due to dyspnea. Patient denies fever, chills, sore throat, cough, chest pain, palpitations, abd pain, n/v. Currently feels short of breath even after receiving duonebs and steroids in the ED. Unable to keep mask on during interview and lay back in stretcher due to subjective sob. Patient repeatedly stating that it is too hot in her room and fanning herself with a paper. Per chart, Dr. Mathew (PCP) has chart notes regarding possible hallucinations. Would like her home medications now but otherwise has no acute complaints.  Has not seen nephrologist.  Denies any N/V.  SOB improving.  States she is urinating well.  Denies any urinary retention.  Denies any supplement or NSAID usage.         PAST MEDICAL & SURGICAL HISTORY:  H/O Raynaud&#x27;s syndrome    Ascorbic acid deficiency    Iron deficiency anemia    Constipation    GERD without esophagitis    Type 2 diabetes mellitus    HTN (hypertension)    Heart failure    HLD (hyperlipidemia)    History of chronic atrial fibrillation  on Eliquis    End stage COPD  on 3LNC    Atrial fibrillation    Hyperlipemia    Chronic sinusitis    Raynaud phenomenon    HTN (hypertension)    DM (diabetes mellitus)    Claustrophobia    COPD (chronic obstructive pulmonary disease)    History of tonsillectomy    S/P tonsillectomy         FAMILY HISTORY:  FH: myocardial infarction    Family history of CABG    FH: MI (myocardial infarction)    FH: CABG (coronary artery bypass surgery)    NC    Social History:Non smoker    MEDICATIONS  (STANDING):  apixaban 5 milliGRAM(s) Oral every 12 hours  ascorbic acid 500 milliGRAM(s) Oral daily  aspirin  chewable 81 milliGRAM(s) Oral daily  atorvastatin 80 milliGRAM(s) Oral at bedtime  azithromycin  IVPB 500 milliGRAM(s) IV Intermittent every 24 hours  budesonide 160 MICROgram(s)/formoterol 4.5 MICROgram(s) Inhaler 2 Puff(s) Inhalation two times a day  cefepime   IVPB 2000 milliGRAM(s) IV Intermittent every 12 hours  cholecalciferol 1000 Unit(s) Oral daily  dextrose 5%. 1000 milliLiter(s) (50 mL/Hr) IV Continuous <Continuous>  dextrose 50% Injectable 12.5 Gram(s) IV Push once  dextrose 50% Injectable 25 Gram(s) IV Push once  dextrose 50% Injectable 25 Gram(s) IV Push once  ferrous    sulfate 325 milliGRAM(s) Oral daily  insulin glargine Injectable (LANTUS) 10 Unit(s) SubCutaneous at bedtime  insulin lispro (HumaLOG) corrective regimen sliding scale   SubCutaneous three times a day before meals  insulin lispro Injectable (HumaLOG) 3 Unit(s) SubCutaneous three times a day before meals  montelukast 10 milliGRAM(s) Oral at bedtime  multivitamin 1 Tablet(s) Oral daily  pantoprazole    Tablet 40 milliGRAM(s) Oral before breakfast  polyethylene glycol 3350 17 Gram(s) Oral daily  potassium chloride    Tablet ER 40 milliEquivalent(s) Oral every 4 hours  senna 2 Tablet(s) Oral at bedtime  tiotropium 18 MICROgram(s) Capsule 1 Capsule(s) Inhalation daily    MEDICATIONS  (PRN):  acetaminophen   Tablet .. 650 milliGRAM(s) Oral every 6 hours PRN Mild Pain (1 - 3)  bisacodyl Suppository 10 milliGRAM(s) Rectal daily PRN Constipation  dextrose 40% Gel 15 Gram(s) Oral once PRN Blood Glucose LESS THAN 70 milliGRAM(s)/deciliter  glucagon  Injectable 1 milliGRAM(s) IntraMuscular once PRN Glucose LESS THAN 70 milligrams/deciliter  guaiFENesin   Syrup  (Sugar-Free) 100 milliGRAM(s) Oral every 6 hours PRN Cough  lactulose Syrup 15 Gram(s) Oral two times a day PRN constipation  levalbuterol Inhalation 0.63 milliGRAM(s) Inhalation every 4 hours PRN shortness of breath and/or wheezing  oxyCODONE    IR 5 milliGRAM(s) Oral every 6 hours PRN Moderate Pain (4 - 6)   Meds reviewed    Allergies    No Known Allergies    Intolerances    albuterol (Unknown)       REVIEW OF SYSTEMS:    CONSTITUTIONAL:  No weight loss   EYES: No eye pain, visual disturbances, or discharge  ENMT:  No difficulty hearing, tinnitus, vertigo; No sinus or throat pain  NECK: No pain or stiffness  BREASTS: No pain, masses, or nipple discharge  RESPIRATORY: +SOB. No wheeze. No NIELSON  CARDIOVASCULAR: No chest pain, palpitations, dizziness,   GASTROINTESTINAL: No abdominal or epigastric pain. No nausea, vomiting, or hematemesis; No diarrhea or constipation.   GENITOURINARY: No dysuria, frequency, hematuria, or incontinence  NEUROLOGICAL: No headaches, memory loss, loss of strength, numbness, or tremors  SKIN: Diffuse erythema, no blisters  LYMPH NODES: No enlarged glands  ENDOCRINE: No heat or cold intolerance; No hair loss  MUSCULOSKELETAL: No joint pain or swelling   PSYCHIATRIC: No depression, anxiety, mood swings, or difficulty sleeping  ALLERY AND IMMUNOLOGIC: No hives or eczema      Vital Signs Last 24 Hrs  T(C): 36.4 (08 Oct 2020 13:54), Max: 36.4 (07 Oct 2020 20:07)  T(F): 97.5 (08 Oct 2020 13:54), Max: 97.5 (07 Oct 2020 20:07)  HR: 98 (08 Oct 2020 13:54) (90 - 119)  BP: 126/80 (08 Oct 2020 13:54) (126/80 - 159/72)  BP(mean): --  RR: 18 (08 Oct 2020 13:54) (18 - 22)  SpO2: 92% (08 Oct 2020 13:54) (92% - 98%)  Daily     Daily     PHYSICAL EXAM:    GENERAL: NAD  HEAD:  Atraumatic, Normocephalic  EYES: EOMI, conjunctiva and sclera clear  ENMT: No drainage from nares, no drainage from pinna  NECK: Supple, neck  veins full  NERVOUS SYSTEM:  Alert & Oriented X3, Good concentration; Motor Strength wnl upper and lower extremities  CHEST/LUNG: Decreased BS, L rales, no rhonchi, wheezing, or rubs  HEART: Regular rate and rhythm; No murmurs, rubs, or gallops  ABDOMEN: Soft, Nontender, Nondistended; Bowel sounds present,  EXTREMITIES: trace Edema  SKIN: No rashes or lesions, pale      LABS:                        9.9    12.92 )-----------( 489      ( 08 Oct 2020 09:17 )             31.8     10-08    133<L>  |  91<L>  |  39<H>  ----------------------------<  220<H>  3.2<L>   |  34<H>  |  1.50<H>    Ca    9.6      08 Oct 2020 09:17  Phos  4.7     10-07  Mg     2.0     10-08    TPro  7.7  /  Alb  2.6<L>  /  TBili  0.3  /  DBili  x   /  AST  20  /  ALT  13  /  AlkPhos  83  10-08        Magnesium, Serum: 2.0 mg/dL (10-08 @ 09:17)          RADIOLOGY & ADDITIONAL TESTS:

## 2020-10-08 NOTE — PROGRESS NOTE ADULT - PROBLEM SELECTOR PLAN 1
likely multifactorial from multifocal PNA and COPD exacerbation  - CXR: b/l lung infiltrates; emphysema   - CT chest done showing multifocal PNA  - VB.34/68/>40/31  - continue solumedrol 40 mg IV daily  - duonebs q6h standing with albuterol inh q4h PRN  - cont COPD medications from rehab  - ID consulted (Dr. Zhu) recs appreciated, - Blood culture NGTD; f/u sputum culture, Legionella U ag; stop zosyn not suspecting anaerobes; switch to cefepime 2gm q12 (creat 1.4); continue azithromycin 500mg daily  - currently on 5LNC, maintain O2 >/ 88  - f/u strep pneumo and legionella antigen  - TTE (10/6/20) LVEF of 55% - no vegetations appreciated   - pulm Dr. Rojas consulted, recs appreciated - Bipap, steroids, inhalers  -pt refusing  treatment options i.e. steroids; apprec palliative care support in management due to complexity of care and nature of history likely multifactorial from multifocal PNA and COPD exacerbation  - CXR: b/l lung infiltrates; emphysema   - CT chest done showing multifocal PNA  - VB.34/68/>40/31  - switch solumedrol 40 mg IV daily to PO   - discontinued duonebs q6h standing with albuterol inh q4h PRN b/c tachycardia & brief a.fib   - on spiriva, xopenex, symbicort, montelukast,  inhaler PRN   - ID consulted (Dr. Zhu) recs appreciated, - Blood culture NGTD; f/u sputum culture, Legionella U ag; stop zosyn not suspecting anaerobes; switch to cefepime 2gm q12 (creat 1.4); continue azithromycin 500mg daily  - currently on 5LNC, maintain O2 >/ 88  - f/u strep pneumo and legionella antigen  - TTE (10/6/20) LVEF of 55% - no vegetations appreciated   - pulm Dr. Rojas consulted, recs appreciated - Bipap, steroids, inhalers  - apprec palliative care support in management due to complexity of care and nature of history. likely multifactorial from multifocal PNA and COPD exacerbation  - CXR: b/l lung infiltrates; emphysema   - CT chest done showing multifocal PNA  - VB.34/68/>40/31  - switch solumedrol 40 mg IV daily to PO   - discontinued duonebs q6h standing with albuterol inh q4h PRN b/c tachycardia & brief a.fib   - on spiriva, xopenex, symbicort, montelukast,  inhaler PRN   - ID consulted (Dr. Zhu) recs appreciated, - Blood culture NGTD; f/u sputum culture, Legionella U ag; stop zosyn not suspecting anaerobes; switch to cefepime 2gm q12 (creat 1.4); continue azithromycin 500mg daily  - currently on NC with BIPAP maintain O2 >/ 88  - f/u strep pneumo and legionella antigen  - TTE (10/6/20) LVEF of 55% - no vegetations appreciated   - pulm Dr. Rojas consulted, recs appreciated - Bipap, steroids, inhalers  - apprec palliative care support in management due to complexity of care and nature of history.

## 2020-10-08 NOTE — PROGRESS NOTE ADULT - PROBLEM SELECTOR PLAN 8
chronic, resolved with BM yesterday   - cont senna and miralax standing  - lactulose and dulcolax suppository PRN for constipation

## 2020-10-08 NOTE — PROGRESS NOTE ADULT - ASSESSMENT
60F w/ end stage COPD on 3LNC (trying to get on  transplant list at [facility]), former smoker, CAD s/p stent (2016), afib on eliquis, uncontrolled DM2 (on insulin), raynaud phenomenon and recent hospitalization at [facility] for confusion and AURORA p/w sob, admitted for acute on chronic resp failure with hypoxia and hyercarbia sec to multifocal PNA and COPD exacerabtion with end stage COPD.

## 2020-10-08 NOTE — PROGRESS NOTE ADULT - SUBJECTIVE AND OBJECTIVE BOX
PULMONARY/CRITICAL CARE    Feels somewhat better, less sob. Wore cpap hs.   Asked to take over pulmonary care.     Patient is a 60y old  Female who presents with a chief complaint of COPD exacerbation, PNA (06 Oct 2020 14:10)    BRIEF HOSPITAL COURSE: ***59y/o woman with end stage COPD on 3L O2 at home awaiting transplant at [facility], former smoker, CAD s/p stent, afib, DM2), raynaud phenomenon and recent hospitalization at [facility] for confusion and AURORA has been sent from [facility] with worsening of SOB. He is very short of breath at rest, worst with any movement, unable to complete her sentences. No fever. Has some cough but no sputum at this time.   CXR done showing b/l patchy infiltrates,   CT chest done showing multifocal PNA and reactive mediastinal lymphadenopathy  She has been started on azithromycin and ceftriaxone and ID was called for further recommendation.   Pt. improved today.  Uses CPAP hs and prn.  No sputum.    Events last 24 hours: ***    PAST MEDICAL & SURGICAL HISTORY:  H/O Raynaud&#x27;s syndrome    Ascorbic acid deficiency    Iron deficiency anemia    Constipation    GERD without esophagitis    Type 2 diabetes mellitus    HTN (hypertension)    Heart failure    HLD (hyperlipidemia)    History of chronic atrial fibrillation  on Eliquis    End stage COPD  on 3LNC    History of tonsillectomy      Allergies    No Known Allergies    Intolerances      FAMILY HISTORY:  FH: myocardial infarction    Family history of CABG            Medications:  azithromycin  IVPB 500 milliGRAM(s) IV Intermittent every 24 hours  piperacillin/tazobactam IVPB.. 3.375 Gram(s) IV Intermittent every 8 hours    buMETAnide 1 milliGRAM(s) Oral two times a day  diltiazem    milliGRAM(s) Oral daily    albuterol/ipratropium for Nebulization 3 milliLiter(s) Nebulizer every 3 hours PRN  budesonide 160 MICROgram(s)/formoterol 4.5 MICROgram(s) Inhaler 2 Puff(s) Inhalation two times a day  guaiFENesin   Syrup  (Sugar-Free) 100 milliGRAM(s) Oral every 6 hours PRN  montelukast 10 milliGRAM(s) Oral at bedtime  theophylline ER Capsule 300 milliGRAM(s) Oral <User Schedule>  tiotropium 18 MICROgram(s) Capsule 1 Capsule(s) Inhalation daily    acetaminophen   Tablet .. 650 milliGRAM(s) Oral every 6 hours PRN  oxyCODONE    IR 5 milliGRAM(s) Oral every 6 hours PRN      apixaban 5 milliGRAM(s) Oral every 12 hours  aspirin  chewable 81 milliGRAM(s) Oral daily    bisacodyl Suppository 10 milliGRAM(s) Rectal daily PRN  lactulose Syrup 15 Gram(s) Oral two times a day PRN  pantoprazole    Tablet 40 milliGRAM(s) Oral before breakfast  polyethylene glycol 3350 17 Gram(s) Oral daily  senna 2 Tablet(s) Oral at bedtime      atorvastatin 80 milliGRAM(s) Oral at bedtime  dextrose 40% Gel 15 Gram(s) Oral once PRN  dextrose 50% Injectable 12.5 Gram(s) IV Push once  dextrose 50% Injectable 25 Gram(s) IV Push once  dextrose 50% Injectable 25 Gram(s) IV Push once  glucagon  Injectable 1 milliGRAM(s) IntraMuscular once PRN  insulin glargine Injectable (LANTUS) 10 Unit(s) SubCutaneous at bedtime  insulin lispro (HumaLOG) corrective regimen sliding scale   SubCutaneous three times a day before meals  methylPREDNISolone sodium succinate Injectable 40 milliGRAM(s) IV Push daily    ascorbic acid 500 milliGRAM(s) Oral daily  cholecalciferol 1000 Unit(s) Oral daily  dextrose 5%. 1000 milliLiter(s) IV Continuous <Continuous>  ferrous    sulfate 325 milliGRAM(s) Oral daily  magnesium sulfate  IVPB 1 Gram(s) IV Intermittent once  multivitamin 1 Tablet(s) Oral daily                ICU Vital Signs Last 24 Hrs  T(C): 36.4 (07 Oct 2020 06:29), Max: 36.7 (06 Oct 2020 09:27)  T(F): 97.5 (07 Oct 2020 06:29), Max: 98.1 (06 Oct 2020 12:34)  HR: 84 (07 Oct 2020 08:09) (54 - 100)  BP: 110/68 (07 Oct 2020 06:29) (110/68 - 149/68)  BP(mean): --  ABP: --  ABP(mean): --  RR: 18 (07 Oct 2020 06:29) (18 - 22)  SpO2: 97% (07 Oct 2020 08:09) (90% - 98%)    Vital Signs Last 24 Hrs  T(C): 36.4 (07 Oct 2020 06:29), Max: 36.7 (06 Oct 2020 09:27)  T(F): 97.5 (07 Oct 2020 06:29), Max: 98.1 (06 Oct 2020 12:34)  HR: 84 (07 Oct 2020 08:09) (54 - 100)  BP: 110/68 (07 Oct 2020 06:29) (110/68 - 149/68)  BP(mean): --  RR: 18 (07 Oct 2020 06:29) (18 - 22)  SpO2: 97% (07 Oct 2020 08:09) (90% - 98%)        I&O's Detail        LABS:                        10.3   8.87  )-----------( 513      ( 07 Oct 2020 06:55 )             33.0     10-07    131<L>  |  86<L>  |  32<H>  ----------------------------<  230<H>  3.6   |  36<H>  |  1.40<H>    Ca    9.6      07 Oct 2020 06:55  Phos  4.7     10-07  Mg     1.5     10-07    TPro  7.8  /  Alb  2.6<L>  /  TBili  0.3  /  DBili  x   /  AST  19  /  ALT  13  /  AlkPhos  88  10-07          CAPILLARY BLOOD GLUCOSE      POCT Blood Glucose.: 269 mg/dL (07 Oct 2020 08:18)        CULTURES:      Physical Examination:    General: mild sob.    HEENT: Pupils equal, reactive to light.  Symmetric.    PULM: decreased bs few rhonchi, no change percussion no rales, wheeze    CVS: Regular rate and rhythm, no murmurs, rubs, or gallops    ABD: Soft, nondistended, nontender, normoactive bowel sounds, no masses    EXT: No edema, nontender    SKIN: Warm and well perfused, no rashes noted.    NEURO: Alert, oriented, interactive, nonfocal    RADIOLOGY: ***d< from: CT Chest No Cont (10.06.20 @ 04:52) >  EXAM:  CT CHEST                            PROCEDURE DATE:  10/06/2020          INTERPRETATION:  CLINICAL INFORMATION: Rule out pneumonia. Chronic obstructive pulmonary disease.    COMPARISON: Same day radiograph    PROCEDURE:  CT of the Chest was performed without intravenous contrast.  Sagittal and coronal reformats were performed. 3-D MIPS images were acquired on the axial plane.    FINDINGS:    LUNGS AND AIRWAYS: Patent central airways.  Centrilobular emphysema in both lungs. Numerous nodular densities with septal thickening showing tree-in-bud appearance in the right upper lobe, right middle lobe, left upper lobe and left lower lobe. There is no atelectasis.  PLEURA: No pleural effusion.  MEDIASTINUM AND LILIANA: 1.4 cm sized right paratracheal lymph node. A few subcentimeter mediastinal lymph nodes.  VESSELS: Moderate atherosclerotic calcification.  HEART: Heart size is normal. No pericardial effusion.  CHEST WALL AND LOWER NECK: Within normal limits.  VISUALIZED UPPER ABDOMEN: Within normal limits.  BONES: Degenerative change.    IMPRESSION:  Multifocal pneumonia involving both lungs as described.  Reactive mediastinal lymphadenopathy.              ROBBIE LEAL M.D., ATTENDING RADIOLOGIST  This document has been electronically signed. Oct  6 2020  5:12AM    < end of copied text >      CRITICAL CARE TIME SPENT: ***

## 2020-10-08 NOTE — PHYSICAL THERAPY INITIAL EVALUATION ADULT - ADDITIONAL COMMENTS
Pt was at rehab prior to this admission and wants to go home upon d/c. Pt lives alone in a house, no steps. pt stating her brother can stay with her at night and pt can hire help. Pt ambulates independently with rollator and Pt was at rehab prior to this admission and wants to go home upon d/c. Pt lives alone in a house, no steps. pt stating her brother can stay with her at night and pt can hire help. Pt ambulates independently with rollator and was independent with ADLs prior. Pt is O2 dependent. Pt is trying to get on lung transplant list

## 2020-10-08 NOTE — PROGRESS NOTE ADULT - PROBLEM SELECTOR PLAN 3
on admission BUN/Cr: 16/1.00; BUN/Cr today 32/1.40; etiology likely 2/2 to diuretics  - on bumetanide 1mg BID  - will hold b/c of AURORA  - trend renal indices  - avoid nephrotoxic meds   - renally dose meds on admission BUN/Cr: 16/1.00; BUN/Cr today 39/1.50 ; etiology likely 2/2 to diuretics  - consult nephro   - on bumetanide 1mg BID  - will hold b/c of AURORA  - trend renal indices  - avoid nephrotoxic meds   - renally dose meds. on admission BUN/Cr: 16/1.00; BUN/Cr today 39/1.50 ; unclear etiology - possibly 2/2 to prerenal vs hypoxemic  - nephro (Dr. Mullen) consulted, recs appreciated - Check UA, Ur lytes, UPC, Ur osm, Corrected calcium 10.2, with paraprotein gap and anemia,  check FLC and SPEP, check Iron studies - f/u results  - bumetanide held 2/2 to ATI  - trend renal indices  - avoid nephrotoxic meds   - renally dose meds.

## 2020-10-08 NOTE — PROGRESS NOTE ADULT - SUBJECTIVE AND OBJECTIVE BOX
[facility] -- Adriana Gonzales, Gina, Funmilayo, Dylan Whitley Savella  Office # [phone]      Follow Up:    Afib   Subjective/Observations:   Interim events noted. Now resting comfortably in bed.  No complaints of chest pain, dyspnea, or palpitations reported. No signs of orthopnea or PND. Wearing nasal cannula     REVIEW OF SYSTEMS: All other review of systems is negative unless indicated above    PAST MEDICAL & SURGICAL HISTORY:  H/O Raynaud&#x27;s syndrome    Ascorbic acid deficiency    Iron deficiency anemia    Constipation    GERD without esophagitis    Type 2 diabetes mellitus    HTN (hypertension)    Heart failure    HLD (hyperlipidemia)    History of chronic atrial fibrillation  on Eliquis    End stage COPD  on 3LNC    Atrial fibrillation    Hyperlipemia    Chronic sinusitis    Raynaud phenomenon    HTN (hypertension)    DM (diabetes mellitus)    Claustrophobia    COPD (chronic obstructive pulmonary disease)    History of tonsillectomy    S/P tonsillectomy        MEDICATIONS  (STANDING):  apixaban 5 milliGRAM(s) Oral every 12 hours  ascorbic acid 500 milliGRAM(s) Oral daily  aspirin  chewable 81 milliGRAM(s) Oral daily  atorvastatin 80 milliGRAM(s) Oral at bedtime  azithromycin  IVPB 500 milliGRAM(s) IV Intermittent every 24 hours  budesonide 160 MICROgram(s)/formoterol 4.5 MICROgram(s) Inhaler 2 Puff(s) Inhalation two times a day  cefepime   IVPB 2000 milliGRAM(s) IV Intermittent every 12 hours  cholecalciferol 1000 Unit(s) Oral daily  dextrose 5%. 1000 milliLiter(s) (50 mL/Hr) IV Continuous <Continuous>  dextrose 50% Injectable 12.5 Gram(s) IV Push once  dextrose 50% Injectable 25 Gram(s) IV Push once  dextrose 50% Injectable 25 Gram(s) IV Push once  diltiazem    milliGRAM(s) Oral daily  ferrous    sulfate 325 milliGRAM(s) Oral daily  insulin glargine Injectable (LANTUS) 10 Unit(s) SubCutaneous at bedtime  insulin lispro (HumaLOG) corrective regimen sliding scale   SubCutaneous three times a day before meals  methylPREDNISolone sodium succinate Injectable 40 milliGRAM(s) IV Push daily  montelukast 10 milliGRAM(s) Oral at bedtime  multivitamin 1 Tablet(s) Oral daily  pantoprazole    Tablet 40 milliGRAM(s) Oral before breakfast  polyethylene glycol 3350 17 Gram(s) Oral daily  senna 2 Tablet(s) Oral at bedtime  tiotropium 18 MICROgram(s) Capsule 1 Capsule(s) Inhalation daily    MEDICATIONS  (PRN):  acetaminophen   Tablet .. 650 milliGRAM(s) Oral every 6 hours PRN Mild Pain (1 - 3)  bisacodyl Suppository 10 milliGRAM(s) Rectal daily PRN Constipation  dextrose 40% Gel 15 Gram(s) Oral once PRN Blood Glucose LESS THAN 70 milliGRAM(s)/deciliter  glucagon  Injectable 1 milliGRAM(s) IntraMuscular once PRN Glucose LESS THAN 70 milligrams/deciliter  guaiFENesin   Syrup  (Sugar-Free) 100 milliGRAM(s) Oral every 6 hours PRN Cough  lactulose Syrup 15 Gram(s) Oral two times a day PRN constipation  levalbuterol Inhalation 0.63 milliGRAM(s) Inhalation every 4 hours PRN shortness of breath and/or wheezing  ondansetron    Tablet 4 milliGRAM(s) Oral three times a day PRN Nausea and/or Vomiting  oxyCODONE    IR 5 milliGRAM(s) Oral every 6 hours PRN Moderate Pain (4 - 6)      Allergies    No Known Allergies    Intolerances    albuterol (Unknown)      Vital Signs Last 24 Hrs  T(C): 36.3 (08 Oct 2020 04:42), Max: 36.6 (07 Oct 2020 12:44)  T(F): 97.3 (08 Oct 2020 04:42), Max: 97.8 (07 Oct 2020 12:44)  HR: 119 (08 Oct 2020 08:41) (89 - 120)  BP: 126/87 (08 Oct 2020 04:42) (115/72 - 159/72)  BP(mean): --  RR: 20 (08 Oct 2020 04:42) (18 - 22)  SpO2: 98% (08 Oct 2020 08:41) (95% - 98%)    I&O's Summary    07 Oct 2020 07:01  -  08 Oct 2020 07:00  --------------------------------------------------------  IN: 355 mL / OUT: 300 mL / NET: 55 mL          PHYSICAL EXAM:  TELE: SR w/ ST up to 130s  Constitutional: NAD, awake and alert, Obese   HEENT: Moist Mucous Membranes, Anicteric  Pulmonary: Non-labored, breath sounds with Bilaterally diminished at bases  No  wheezes, crackles or rhonchi   Cardiovascular: Regular, S1 and S2 nl, No murmurs, rubs, gallops or clicks  Gastrointestinal: Bowel Sounds present, soft, nontender.   Lymph: No lymphadenopathy. No peripheral edema.  Skin: No visible rashes or ulcers.  Psych:  Mood & affect appropriate    LABS: All Labs Reviewed:                        10.3   8.87  )-----------( 513      ( 07 Oct 2020 06:55 )             33.0                         9.9    11.41 )-----------( 435      ( 06 Oct 2020 02:51 )             30.5     07 Oct 2020 06:55    131    |  86     |  32     ----------------------------<  230    3.6     |  36     |  1.40   06 Oct 2020 02:51    133    |  90     |  16     ----------------------------<  132    3.8     |  35     |  1.00     Ca    9.6        07 Oct 2020 06:55  Ca    8.9        06 Oct 2020 02:51  Phos  4.7       07 Oct 2020 06:55  Mg     1.5       07 Oct 2020 06:55    TPro  7.8    /  Alb  2.6    /  TBili  0.3    /  DBili  x      /  AST  19     /  ALT  13     /  AlkPhos  88     07 Oct 2020 06:55  TPro  7.8    /  Alb  2.4    /  TBili  0.5    /  DBili  x      /  AST  32     /  ALT  14     /  AlkPhos  87     06 Oct 2020 02:51          < from: 12 Lead ECG (10.06.20 @ 01:51) >  Ventricular Rate 135 BPM    Atrial Rate 135 BPM    P-R Interval 126 ms    QRS Duration 132 ms    Q-T Interval 316 ms    QTC Calculation(Bazett) 474 ms    P Axis 50 degrees    R Axis -54 degrees    T Axis 44 degrees    Diagnosis Line Sinus tachycardia with premature supraventricular complexes  Right bundle branch block  Left anterior fascicular block  *** Bifascicular block ***  Inferior infarct , age undetermined  Abnormal ECG  No previous ECGs available  Confirmed by Funmilayo TORRES, Julio ([id number]) on 10/6/2020 12:06:33 PM    < end of copied text >     < from: TTE Echo Complete w/o Contrast w/ Doppler (10.06.20 @ 17:10) >  XAM:  ECHO TTE WO CON COMP W DOPP         PROCEDURE DATE:  10/06/2020        INTERPRETATION:  INDICATION: Rule out infectious endocarditis  Sonographer AS    Blood Pressure 135/74    Height 162.6 cm     Weight 76.2 kg       BSA 1.8 sq m    Dimensions:  LA 3.7       Normal Values: 2.0 - 4.0 cm  Ao 2.7        Normal Values: 2.0 - 3.8 cm  SEPTUM 1.4       Normal Values: 0.6 - 1.2 cm  PWT 1.3       Normal Values: 0.6 - 1.1 cm  LVIDd 3.9         Normal Values: 3.0 - 5.6 cm  LVIDs 2.8         Normal Values: 1.8 - 4.0 cm        OBSERVATIONS:  Technically difficult and limited study  Mitral Valve: Thickened leaflets, mild MR.  Aortic Valve/Aorta: Aortic valve is not well-visualized, grossly normal function without severe pathology  Tricuspid Valve: normal with trace TR.  Pulmonic Valve: Not well-visualized  Left Atrium: normal  Right Atrium: Not well-visualized  Left Ventricle: normal LV size and systolic function, estimated LVEF of 55%. Mild LVH. Endocardium is not well-visualized, cannot rule out segmental dysfunction  Right Ventricle: Grossly normal size and systolic function.  Pericardium/Pleura: normal, no significant pericardial effusion.  Pulmonary/RV Pressure: estimated PA systolic pressure of 15.7 mmHg assuming an RA pressure of 3 mmHg.  LV diastolic dysfunction is present    Conclusion:  Technically difficult and limited study  Mild concentric LVH with grossly normal left ventricular systolic function, estimated LVEF of 55%.  Grossly normal RV size and systolic function.  Aortic valve is not well-visualized, grossly normal function without severe pathology  Mild MR  Trace TR.  No significant pericardial effusion.              CARINA COVARRUBIAS   This document has been electronically signed. Oct  7 2020  8:35AM    < end of copied text >              [facility] -- Adriana Gonzales, Gina, Funmilayo, Dylan Whitley Savella  Office # [phone]      Follow Up:    Afib   Subjective/Observations:   Interim events noted. Now resting comfortably in bed.  No complaints of chest pain, dyspnea, or palpitations reported. No signs of orthopnea or PND. Wearing nasal cannula     REVIEW OF SYSTEMS: All other review of systems is negative unless indicated above    PAST MEDICAL & SURGICAL HISTORY:  H/O Raynaud&#x27;s syndrome    Ascorbic acid deficiency    Iron deficiency anemia    Constipation    GERD without esophagitis    Type 2 diabetes mellitus    HTN (hypertension)    Heart failure    HLD (hyperlipidemia)    History of chronic atrial fibrillation  on Eliquis    End stage COPD  on 3LNC    Atrial fibrillation    Hyperlipemia    Chronic sinusitis    Raynaud phenomenon    HTN (hypertension)    DM (diabetes mellitus)    Claustrophobia    COPD (chronic obstructive pulmonary disease)    History of tonsillectomy    S/P tonsillectomy        MEDICATIONS  (STANDING):  apixaban 5 milliGRAM(s) Oral every 12 hours  ascorbic acid 500 milliGRAM(s) Oral daily  aspirin  chewable 81 milliGRAM(s) Oral daily  atorvastatin 80 milliGRAM(s) Oral at bedtime  azithromycin  IVPB 500 milliGRAM(s) IV Intermittent every 24 hours  budesonide 160 MICROgram(s)/formoterol 4.5 MICROgram(s) Inhaler 2 Puff(s) Inhalation two times a day  cefepime   IVPB 2000 milliGRAM(s) IV Intermittent every 12 hours  cholecalciferol 1000 Unit(s) Oral daily  dextrose 5%. 1000 milliLiter(s) (50 mL/Hr) IV Continuous <Continuous>  dextrose 50% Injectable 12.5 Gram(s) IV Push once  dextrose 50% Injectable 25 Gram(s) IV Push once  dextrose 50% Injectable 25 Gram(s) IV Push once  diltiazem    milliGRAM(s) Oral daily  ferrous    sulfate 325 milliGRAM(s) Oral daily  insulin glargine Injectable (LANTUS) 10 Unit(s) SubCutaneous at bedtime  insulin lispro (HumaLOG) corrective regimen sliding scale   SubCutaneous three times a day before meals  methylPREDNISolone sodium succinate Injectable 40 milliGRAM(s) IV Push daily  montelukast 10 milliGRAM(s) Oral at bedtime  multivitamin 1 Tablet(s) Oral daily  pantoprazole    Tablet 40 milliGRAM(s) Oral before breakfast  polyethylene glycol 3350 17 Gram(s) Oral daily  senna 2 Tablet(s) Oral at bedtime  tiotropium 18 MICROgram(s) Capsule 1 Capsule(s) Inhalation daily    MEDICATIONS  (PRN):  acetaminophen   Tablet .. 650 milliGRAM(s) Oral every 6 hours PRN Mild Pain (1 - 3)  bisacodyl Suppository 10 milliGRAM(s) Rectal daily PRN Constipation  dextrose 40% Gel 15 Gram(s) Oral once PRN Blood Glucose LESS THAN 70 milliGRAM(s)/deciliter  glucagon  Injectable 1 milliGRAM(s) IntraMuscular once PRN Glucose LESS THAN 70 milligrams/deciliter  guaiFENesin   Syrup  (Sugar-Free) 100 milliGRAM(s) Oral every 6 hours PRN Cough  lactulose Syrup 15 Gram(s) Oral two times a day PRN constipation  levalbuterol Inhalation 0.63 milliGRAM(s) Inhalation every 4 hours PRN shortness of breath and/or wheezing  ondansetron    Tablet 4 milliGRAM(s) Oral three times a day PRN Nausea and/or Vomiting  oxyCODONE    IR 5 milliGRAM(s) Oral every 6 hours PRN Moderate Pain (4 - 6)      Allergies    No Known Allergies    Intolerances    albuterol (Unknown)      Vital Signs Last 24 Hrs  T(C): 36.3 (08 Oct 2020 04:42), Max: 36.6 (07 Oct 2020 12:44)  T(F): 97.3 (08 Oct 2020 04:42), Max: 97.8 (07 Oct 2020 12:44)  HR: 119 (08 Oct 2020 08:41) (89 - 120)  BP: 126/87 (08 Oct 2020 04:42) (115/72 - 159/72)  BP(mean): --  RR: 20 (08 Oct 2020 04:42) (18 - 22)  SpO2: 98% (08 Oct 2020 08:41) (95% - 98%)    I&O's Summary    07 Oct 2020 07:01  -  08 Oct 2020 07:00  --------------------------------------------------------  IN: 355 mL / OUT: 300 mL / NET: 55 mL          PHYSICAL EXAM:  TELE: SR w/ ST up to 130s  Constitutional: NAD, awake and alert, Obese   HEENT: Moist Mucous Membranes, Anicteric  Pulmonary: Non-labored, breath sounds with Bilaterally diminished at bases  No  wheezes, crackles or rhonchi   Cardiovascular: Regular, S1 and S2 nl, No murmurs, rubs, gallops or clicks  Gastrointestinal: Bowel Sounds present, soft, nontender.   Lymph: No lymphadenopathy. No peripheral edema.  Skin: No visible rashes or ulcers.  Psych:  Mood & affect appropriate    LABS: All Labs Reviewed:                        9.9    12.92 )-----------( 489      ( 08 Oct 2020 09:17 )             31.8                         10.3   8.87  )-----------( 513      ( 07 Oct 2020 06:55 )             33.0                         9.9    11.41 )-----------( 435      ( 06 Oct 2020 02:51 )             30.5     08 Oct 2020 09:17    133    |  91     |  39     ----------------------------<  220    3.2     |  34     |  1.50   07 Oct 2020 06:55    131    |  86     |  32     ----------------------------<  230    3.6     |  36     |  1.40   06 Oct 2020 02:51    133    |  90     |  16     ----------------------------<  132    3.8     |  35     |  1.00     Ca    9.6        08 Oct 2020 09:17  Ca    9.6        07 Oct 2020 06:55  Ca    8.9        06 Oct 2020 02:51  Phos  4.7       07 Oct 2020 06:55  Mg     2.0       08 Oct 2020 09:17  Mg     1.5       07 Oct 2020 06:55    TPro  7.7    /  Alb  2.6    /  TBili  0.3    /  DBili  x      /  AST  20     /  ALT  13     /  AlkPhos  83     08 Oct 2020 09:17  TPro  7.8    /  Alb  2.6    /  TBili  0.3    /  DBili  x      /  AST  19     /  ALT  13     /  AlkPhos  88     07 Oct 2020 06:55  TPro  7.8    /  Alb  2.4    /  TBili  0.5    /  DBili  x      /  AST  32     /  ALT  14     /  AlkPhos  87     06 Oct 2020 02:51                  < from: 12 Lead ECG (10.06.20 @ 01:51) >  Ventricular Rate 135 BPM    Atrial Rate 135 BPM    P-R Interval 126 ms    QRS Duration 132 ms    Q-T Interval 316 ms    QTC Calculation(Bazett) 474 ms    P Axis 50 degrees    R Axis -54 degrees    T Axis 44 degrees    Diagnosis Line Sinus tachycardia with premature supraventricular complexes  Right bundle branch block  Left anterior fascicular block  *** Bifascicular block ***  Inferior infarct , age undetermined  Abnormal ECG  No previous ECGs available  Confirmed by Julio Mello MD ([id number]) on 10/6/2020 12:06:33 PM    < end of copied text >     < from: TTE Echo Complete w/o Contrast w/ Doppler (10.06.20 @ 17:10) >  XAM:  ECHO TTE WO CON COMP W DOPP         PROCEDURE DATE:  10/06/2020        INTERPRETATION:  INDICATION: Rule out infectious endocarditis  Sonographer AS    Blood Pressure 135/74    Height 162.6 cm     Weight 76.2 kg       BSA 1.8 sq m    Dimensions:  LA 3.7       Normal Values: 2.0 - 4.0 cm  Ao 2.7        Normal Values: 2.0 - 3.8 cm  SEPTUM 1.4       Normal Values: 0.6 - 1.2 cm  PWT 1.3       Normal Values: 0.6 - 1.1 cm  LVIDd 3.9         Normal Values: 3.0 - 5.6 cm  LVIDs 2.8         Normal Values: 1.8 - 4.0 cm        OBSERVATIONS:  Technically difficult and limited study  Mitral Valve: Thickened leaflets, mild MR.  Aortic Valve/Aorta: Aortic valve is not well-visualized, grossly normal function without severe pathology  Tricuspid Valve: normal with trace TR.  Pulmonic Valve: Not well-visualized  Left Atrium: normal  Right Atrium: Not well-visualized  Left Ventricle: normal LV size and systolic function, estimated LVEF of 55%. Mild LVH. Endocardium is not well-visualized, cannot rule out segmental dysfunction  Right Ventricle: Grossly normal size and systolic function.  Pericardium/Pleura: normal, no significant pericardial effusion.  Pulmonary/RV Pressure: estimated PA systolic pressure of 15.7 mmHg assuming an RA pressure of 3 mmHg.  LV diastolic dysfunction is present    Conclusion:  Technically difficult and limited study  Mild concentric LVH with grossly normal left ventricular systolic function, estimated LVEF of 55%.  Grossly normal RV size and systolic function.  Aortic valve is not well-visualized, grossly normal function without severe pathology  Mild MR  Trace TR.  No significant pericardial effusion.              CARINA COVARRUBIAS   This document has been electronically signed. Oct  7 2020  8:35AM    < end of copied text >

## 2020-10-08 NOTE — PROGRESS NOTE ADULT - ATTENDING COMMENTS
I personally saw and examined the patient in detail.  I have spoken to the above provider regarding the assessment and plan of care.  I reviewed the above assessment and plan of care, and agree.  I have made changes in the body of the note where appropriate.  AF overnight while ambulating to the bathroom.  May need to increase diltiazem.  Continue to monitor on  telemetry. To follow closely while admitted.

## 2020-10-08 NOTE — PROGRESS NOTE ADULT - SUBJECTIVE AND OBJECTIVE BOX
[facility]  INFECTIOUS DISEASES   =======================================================    [facility]-[id number]  CHERI HARTMAN     Follow up: COPD exacerbation, Pneumonia    Still has some shortness of breath especially NIELSON. Some cough and sputum but not heavy.   Feels better than yesterday.     PAST MEDICAL & SURGICAL HISTORY:  H/O Raynaud&#x27;s syndrome  Ascorbic acid deficiency  Iron deficiency anemia  Constipation  GERD without esophagitis  Type 2 diabetes mellitus  HTN (hypertension)  Heart failure  HLD (hyperlipidemia)  History of chronic atrial fibrillation  on Eliquis  End stage COPD  on 3LNC  History of tonsillectomy    Social Hx: former heavy smoker, no ETOH or drugs     FAMILY HISTORY:  FH: myocardial infarction    Family history of CABG    Allergies  No Known Allergies    Antibiotics:  Azithromycin   Cefepime     REVIEW OF SYSTEMS:  CONSTITUTIONAL:  No Fever or chills  HEENT:  No diplopia or blurred vision.  No sore throat or runny nose.  CARDIOVASCULAR:  No chest pain or SOB.  RESPIRATORY:  +cough, severe SOB  GASTROINTESTINAL:  No nausea, vomiting or diarrhea.  GENITOURINARY:  No dysuria, frequency or urgency. No Blood in urine  MUSCULOSKELETAL:  no joint aches, no muscle pain  SKIN:  No change in skin, hair or nails.  NEUROLOGIC:  No paresthesias, fasciculations, seizures or weakness.  PSYCHIATRIC:  No disorder of thought or mood.  ENDOCRINE:  No heat or cold intolerance, polyuria or polydipsia.  HEMATOLOGICAL:  No easy bruising or bleeding.     Physical Exam:  Vital Signs Last 24 Hrs  T(C): 36.4 (08 Oct 2020 13:54), Max: 36.4 (07 Oct 2020 20:07)  T(F): 97.5 (08 Oct 2020 13:54), Max: 97.5 (07 Oct 2020 20:07)  HR: 98 (08 Oct 2020 13:54) (90 - 119)  BP: 126/80 (08 Oct 2020 13:54) (126/80 - 159/72)  BP(mean): --  RR: 18 (08 Oct 2020 13:54) (18 - 22)  SpO2: 92% (08 Oct 2020 13:54) (92% - 98%)  GEN: NAD  HEENT: normocephalic and atraumatic. EOMI. PERRL.    NECK: Supple.  No lymphadenopathy   LUNGS: very poor air movement but Clear to auscultation with no wheezing or rales .  HEART: Regular rate and rhythm   ABDOMEN: Soft, nontender, and nondistended.  Positive bowel sounds.    : No CVA tenderness  EXTREMITIES: Without any cyanosis, clubbing, rash, lesions or edema.  NEUROLOGIC: grossly intact.  PSYCHIATRIC: Appropriate affect .  SKIN: No ulceration or induration present.      Labs:                        9.9    12.92 )-----------( 489      ( 08 Oct 2020 09:17 )             31.8      10-08    133<L>  |  91<L>  |  39<H>  ----------------------------<  220<H>  3.2<L>   |  34<H>  |  1.50<H>    Ca    9.6      08 Oct 2020 09:17  Phos  4.7     10-07  Mg     2.0     10-08    TPro  7.7  /  Alb  2.6<L>  /  TBili  0.3  /  DBili  x   /  AST  20  /  ALT  13  /  AlkPhos  83  10-08    Culture - Blood (collected 10-06-20 @ 09:23)  Source: .Blood Blood    Culture - Blood (collected 10-06-20 @ 09:23)  Source: .Blood Blood    WBC Count: 12.92 K/uL (10-08-20 @ 09:17)  WBC Count: 8.87 K/uL (10-07-20 @ 06:55)  WBC Count: 11.41 K/uL (10-06-20 @ 02:51)    Creatinine, Serum: 1.50 mg/dL (10-08-20 @ 09:17)  Creatinine, Serum: 1.40 mg/dL (10-07-20 @ 06:55)  Creatinine, Serum: 1.00 mg/dL (10-06-20 @ 02:51)    C-Reactive Protein, Serum: 26.32 mg/dL (10-06-20 @ 09:01)      COVID-19 PCR: NotDetec (10-06-20 @ 03:23)    Assessment and Plan:   59y/o woman with end stage COPD on 3L O2 at home awaiting transplant at [facility], former smoker, CAD s/p stent, afib, DM2), raynaud phenomenon and recent hospitalization at [facility] for confusion and AURORA has been sent from [facility] with worsening of SOB. CT with multilobar opacities. Due to recent hospitalization and rehab stay, will cover for possible HCAP.   Very high risk with a poor lung capacities.     COPD, Pneumonia  - Blood culture NGTD  - Sputum culture pending collection   - Legionella U ag pending  - CXR and CT with multilobar opacities   - Respiratory and pulmonary consults noted, regular nebulizer treatment and inhalers.   - TTE, result noted, EF=55%  - Conitnue cefepime 2gm q12 (creat 1.5), total 7days  - Continue azithromycin 500mg daily total 3 days if Legionella U ag negative     Will follow.    Wagner Juarez MD  Division of Infectious Diseases   Cell [phone] between 7am and 5pm   After 5pm and weekends please call ID service at [phone].     Attending Attestation:   Plan discussed with Dr. Ruiz.

## 2020-10-08 NOTE — PROGRESS NOTE ADULT - PROBLEM SELECTOR PLAN 6
recent TTE in 08/2020 showing normal LVEF, stage 1 diastolic dysfunction  -bumetanide 1mg BID at rehab - hold b/c of AURORA   -TTE (10/6/20) LVEF of 55%   - caution with excessive IVF recent TTE in 08/2020 showing normal LVEF, stage 1 diastolic dysfunction  - hold bumex 2/2 to AURORA  -TTE (10/6/20) LVEF of 55%   - caution with excessive IVF

## 2020-10-08 NOTE — PROGRESS NOTE ADULT - ATTENDING COMMENTS
60F w/ end stage COPD on 3LNC (trying to get on  transplant list at [facility]), former smoker, CAD s/p stent (2016), afib on eliquis, uncontrolled DM2 (on insulin), raynaud phenomenon and recent hospitalization at [facility] for confusion and AURORA p/w sob, admitted for acute on chronic resp failure with hypoxia and hyercarbia sec to multifocal PNA and COPD exacerabtion with end stage COPD. Plan: cont monitor clinical course, improved aeration on exam, titrate to baseline of 3L, apprec palliative collaboration in advance care planning, apprec pulm recs, apprec cardio recs

## 2020-10-08 NOTE — CONSULT NOTE ADULT - SUBJECTIVE AND OBJECTIVE BOX
HPI:  60F w/ end stage COPD on 3LNC (pending transplant list at [facility]), former smoker, CAD s/p stent (2016), afib on eliquis, uncontrolled DM2 (on insulin), raynaud phenomenon and recent hospitalization at [facility] for confusion and [facility] p/w sob. [facility] Patient states that she has had worsening shortness of breath for past two days. Unable to obtain comprehensive history due to dyspnea. Patient denies fever, chills, sore throat, cough, chest pain, palpitations, abd pain, n/v. Currently feels short of breath even after receiving duonebs and steroids in the ED. Unable to keep mask on during interview and lay back in stretcher due to subjective sob. Patient repeatedly stating that it is too hot in her room and fanning herself with a paper. Per chart, Dr. Mathew (PCP) has chart notes regarding possible hallucinations. Would like her home medications now but otherwise has no acute complaints.    In the ED, VS T97.7F  /78 RR 21 SpO2 94% on 4LNC. Labs significant for mild leukocytosis of 11.41, H/H 9.9/30.5, thrombophilia of 435. CO2 35, albumin 2.4.   CXR done showing b/l patchy infiltrates, official read pending  CT chest done showing multifocal PNA and reactive mediastinal lymphadenopathy  EKG read limited by artifact, sinus tachycardia with RBBB and premature supraventricular complexes (06 Oct 2020 04:55)    PERTINENT PM/SXH:   H/O Raynaud&#x27;s syndrome    Ascorbic acid deficiency    Iron deficiency anemia    Constipation    GERD without esophagitis    Type 2 diabetes mellitus    HTN (hypertension)    Heart failure    HLD (hyperlipidemia)    History of chronic atrial fibrillation    End stage COPD    Advanced COPD    Atrial fibrillation    Hyperlipemia    Chronic sinusitis    Raynaud phenomenon    HTN (hypertension)    DM (diabetes mellitus)    Claustrophobia    COPD (chronic obstructive pulmonary disease)      History of tonsillectomy    S/P tonsillectomy      FAMILY HISTORY:  FH: myocardial infarction    Family history of CABG    FH: MI (myocardial infarction)    FH: CABG (coronary artery bypass surgery)      ITEMS NOT CHECKED ARE NOT PRESENT    SOCIAL HISTORY:   Significant other/partner[ ]  Children[ ]  Yarsani/Spirituality:  Substance hx:  [ ]   Tobacco hx:  [ ]   Alcohol hx: [ ]   Home Opioid hx:  [ ] I-Stop Reference No:  Living Situation: [ ]Home  [ ]Long term care  [ ]Rehab [ ]Other    ADVANCE DIRECTIVES:    DNR  MOLST  [ ]  Living Will  [ ]   DECISION MAKER(s):  [ ] Health Care Proxy(s)  [ ] Surrogate(s)  [ ] Guardian           Name(s): Phone Number(s):    BASELINE (I)ADL(s) (prior to admission):  [place]: [ ]Total  [ ] Moderate [ ]Dependent    Allergies    No Known Allergies    Intolerances    albuterol (Unknown)  MEDICATIONS  (STANDING):  apixaban 5 milliGRAM(s) Oral every 12 hours  ascorbic acid 500 milliGRAM(s) Oral daily  aspirin  chewable 81 milliGRAM(s) Oral daily  atorvastatin 80 milliGRAM(s) Oral at bedtime  azithromycin  IVPB 500 milliGRAM(s) IV Intermittent every 24 hours  budesonide 160 MICROgram(s)/formoterol 4.5 MICROgram(s) Inhaler 2 Puff(s) Inhalation two times a day  cefepime   IVPB 2000 milliGRAM(s) IV Intermittent every 12 hours  cholecalciferol 1000 Unit(s) Oral daily  dextrose 5%. 1000 milliLiter(s) (50 mL/Hr) IV Continuous <Continuous>  dextrose 50% Injectable 12.5 Gram(s) IV Push once  dextrose 50% Injectable 25 Gram(s) IV Push once  dextrose 50% Injectable 25 Gram(s) IV Push once  ferrous    sulfate 325 milliGRAM(s) Oral daily  insulin glargine Injectable (LANTUS) 10 Unit(s) SubCutaneous at bedtime  insulin lispro (HumaLOG) corrective regimen sliding scale   SubCutaneous three times a day before meals  insulin lispro Injectable (HumaLOG) 3 Unit(s) SubCutaneous three times a day before meals  montelukast 10 milliGRAM(s) Oral at bedtime  multivitamin 1 Tablet(s) Oral daily  pantoprazole    Tablet 40 milliGRAM(s) Oral before breakfast  polyethylene glycol 3350 17 Gram(s) Oral daily  potassium chloride    Tablet ER 40 milliEquivalent(s) Oral every 4 hours  senna 2 Tablet(s) Oral at bedtime  tiotropium 18 MICROgram(s) Capsule 1 Capsule(s) Inhalation daily    MEDICATIONS  (PRN):  acetaminophen   Tablet .. 650 milliGRAM(s) Oral every 6 hours PRN Mild Pain (1 - 3)  bisacodyl Suppository 10 milliGRAM(s) Rectal daily PRN Constipation  dextrose 40% Gel 15 Gram(s) Oral once PRN Blood Glucose LESS THAN 70 milliGRAM(s)/deciliter  glucagon  Injectable 1 milliGRAM(s) IntraMuscular once PRN Glucose LESS THAN 70 milligrams/deciliter  guaiFENesin   Syrup  (Sugar-Free) 100 milliGRAM(s) Oral every 6 hours PRN Cough  lactulose Syrup 15 Gram(s) Oral two times a day PRN constipation  levalbuterol Inhalation 0.63 milliGRAM(s) Inhalation every 4 hours PRN shortness of breath and/or wheezing  oxyCODONE    IR 5 milliGRAM(s) Oral every 6 hours PRN Moderate Pain (4 - 6)    PRESENT SYMPTOMS: [ ]Unable to obtain due to poor mentation   Source if other than patient:  [ ]Family   [ ]Team     Pain: [ ]yes [ ]no  QOL impact -   Location -                    Aggravating factors -  Quality -  Radiation -  Timing-  Severity (0-10 scale):  Minimal acceptable level (0-10 scale):     CPOT:    https://www.[facility].org/getattachment/zeg04j18-8a5a-[email or web address]-9t8t-1310k2659i8d/Critical-Care-Pain-Observation-Tool-(CPOT)      PAIN AD Score:     http://geriatrictoolkit.[place].[facility]/cog/painad.pdf (press ctrl +  left click to view)    Dyspnea:                           [ ]Mild [ ]Moderate [ ]Severe  Anxiety:                             [ ]Mild [ ]Moderate [ ]Severe  Fatigue:                             [ ]Mild [ ]Moderate [ ]Severe  Nausea:                             [ ]Mild [ ]Moderate [ ]Severe  Loss of appetite:              [ ]Mild [ ]Moderate [ ]Severe  Constipation:                    [ ]Mild [ ]Moderate [ ]Severe    Other Symptoms:  [ ]All other review of systems negative     Palliative Performance Status Version 2:         %    http://npcrc.org/files/news/palliative_performance_scale_ppsv2.pdf  PHYSICAL EXAM:  Vital Signs Last 24 Hrs  T(C): 36.4 (08 Oct 2020 13:54), Max: 36.4 (07 Oct 2020 20:07)  T(F): 97.5 (08 Oct 2020 13:54), Max: 97.5 (07 Oct 2020 20:07)  HR: 98 (08 Oct 2020 13:54) (90 - 119)  BP: 126/80 (08 Oct 2020 13:54) (126/80 - 159/72)  BP(mean): --  RR: 18 (08 Oct 2020 13:54) (18 - 22)  SpO2: 92% (08 Oct 2020 13:54) (92% - 98%) I&O's Summary    07 Oct 2020 07:01  -  08 Oct 2020 07:00  --------------------------------------------------------  IN: 355 mL / OUT: 300 mL / NET: 55 mL    GENERAL: NAD. anxious appearing  HEENT:  anicteric, EOMI b/l   CHEST/LUNG:  decreased breath sounds b/l, no rales, wheezes, or rhonchi  HEART:  RRR, S1, S2; no murmurs, rubs or gallops   ABDOMEN:  BS+, soft, nontender, nondistended  EXTREMITIES: no edema LE, cyanosis, or calf tenderness  NERVOUS SYSTEM: answers questions and follows commands appropriately    LABS:                        9.9    12.92 )-----------( 489      ( 08 Oct 2020 09:17 )             31.8   10-08    133<L>  |  91<L>  |  39<H>  ----------------------------<  220<H>  3.2<L>   |  34<H>  |  1.50<H>    Ca    9.6      08 Oct 2020 09:17  Phos  4.7     10-07  Mg     2.0     10-08    TPro  7.7  /  Alb  2.6<L>  /  TBili  0.3  /  DBili  x   /  AST  20  /  ALT  13  /  AlkPhos  83  10-08      Urinalysis Basic - ( 08 Oct 2020 14:18 )    Color: Yellow / Appearance: Clear / S.015 / pH: x  Gluc: x / Ketone: Trace  / Bili: Negative / Urobili: Negative   Blood: x / Protein: 30 mg/dL / Nitrite: Negative   Leuk Esterase: Negative / RBC: 0-2 /HPF / WBC 3-5   Sq Epi: x / Non Sq Epi: Occasional / Bacteria: Occasional      RADIOLOGY & ADDITIONAL STUDIES:    PROTEIN CALORIE MALNUTRITION PRESENT: [ ]mild [ ]moderate [ ]severe [ ]underweight [ ]morbid obesity  https://www.andeal.org/vault/2440/web/files/ONC/Table_Clinical%20Characteristics%20to%20Document%20Malnutrition-White%20JV%20et%20al%135887.pdf    Height (cm): 162.6 (10-06-20 @ 01:24), 162.6 (20 @ 23:15), 162.56 (19 @ 08:06)  Weight (kg): 76.2 (10-06-20 @ :24), 90.3 (20 @ 23:15), 84.6 (19 @ 08:06)  BMI (kg/m2): 28.8 (10-06-20 @ :24), 34.2 (20 @ 23:15), 32 (19 @ 08:06)    [ ]PPSV2 < or = to 30% [ ]significant weight loss  [ ]poor nutritional intake  [ ]anasarca     Albumin, Serum: 2.6 g/dL (10-08-20 @ 09:17)   [ ]Artificial Nutrition      REFERRALS:   [ ]Chaplaincy  [ ]Hospice  [ ]Child Life  [ ]Social Work  [ ]Case management [ ]Holistic Therapy     Goals of Care Document: ALVINA Gonzales (10-07-20 @ 16:36)  Goals of Care Conversation:   Participants:  · Participants  Patient    Advance Directives:  · Does patient have Advance Directive  No  · Caregiver:  yes  · Name  Michele Vega  · Phone Number  [phone]    Conversation Discussion:  · Conversation  AD  · Conversation Details  met pt, after speaking to Wang Hanson CM: pt uses O2 at home, goal to be on lung transplant list, wants HC, not [facility], pt has 2 brothers, transport pt to MD offices.  pt with SOB, on O2, pt approached regarding HCP, educated on role of hcp, pt declines to complete hcp or take a copy to think about at home.  pt unsure who will be her hcp, she has 2 brothers, Michele is younger brother.  pt spoke of coming from a long line of ill family, including her mother, she knows all about a hcp and the decisions that need to be made, but declines to discuss further, PC RN contact # given to pt, will follow as needed.    Personal Advance Directives Treatment Guidelines:   Treatment Guidelines:  · Decision Maker  Patient    Location of Discussion:   Duration of Advanced Care Planning Meeting:  · Time spent (in minutes)  25    Location of Discussion:  · Location of discussion  Face to face      Electronic Signatures:  Clementina Gonzales (RN)   (Signed 07-Oct-2020 16:48)  	Authored: Goals of Care Conversation, Personal Advance Directives Treatment Guidelines, Location of Discussion    Last Updated: 07-Oct-2020 20:52 by Suad Pinto)

## 2020-10-08 NOTE — PROGRESS NOTE ADULT - ASSESSMENT
60F w/ end stage COPD on 3LNC (pending transplant list at [facility]), former smoker, CAD s/p stent (2016), afib on eliquis, uncontrolled DM2 (on insulin), raynaud phenomenon and recent hospitalization at [facility] for confusion and [facility]A p/w sob, admitted for COPD exacerbation and multifocal PNA      CAD s/p stent   -CW asa, statin    paroxysmal Afib  - She went into afib with a HR in the 130s overnight when she went to bathroom. She is now sinus.   -she is deconditioned and has significant COPD/pneumonia contributing to her increased rates  - Continue eliquis 5mg  - Continue cardizem   -TTE w/ mild concentric LVH grossly nL LVSF ef 55%, mild MR      DM2  -per medicine    VTE ppx  - cw  apixaban    -per primary team     -Monitor and replete lytes, keep K>4 and Mg >2  - Further cardiac workup will depend on clinical course.   - All other workup per primary team  Thank you for the consult  Will continue to follow  David Albert DNP, ANP-c  Cardiology   Spectra #[phone]              60F w/ end stage COPD on 3LNC (pending transplant list at [facility]), former smoker, CAD s/p stent (2016), afib on eliquis, uncontrolled DM2 (on insulin), raynaud phenomenon and recent hospitalization at [facility] for confusion and A[facility]A p/w sob, admitted for COPD exacerbation and multifocal PNA    CAD s/p stent   -CW asa, statin    paroxysmal Afib  - She went into afib with a HR in the 130s overnight when she went to bathroom. She is now sinus.   -she is deconditioned and has significant COPD/pneumonia contributing to her increased rates  - Continue eliquis 5mg  - Continue cardizem   -TTE w/ mild concentric LVH grossly nL LVSF ef 55%, mild MR    COPD/Pneumonia  -Ct chest noted  -per pulm/ID    DM2  -per medicine    VTE ppx  - cw  apixaban    -per primary team     -Monitor and replete lytes, keep K>4 and Mg >2  - Further cardiac workup will depend on clinical course.   - All other workup per primary team  Thank you for the consult  Will continue to follow  David Albert DNP, ANP-c  Cardiology   Spectra #[phone]

## 2020-10-08 NOTE — PROGRESS NOTE ADULT - PROBLEM SELECTOR PLAN 10
On Eliquis 5mg bid, no need for further pharmacologic DVT ppx    11. Hypomagnesemia   - Magnesium IVPB 1g given   - f/u on magnesium in AM On Eliquis 5mg bid, no need for further pharmacologic DVT ppx    11. Hypomagnesemia - resolved   - s/p Magnesium IVPB 1g given x 1

## 2020-10-08 NOTE — PROGRESS NOTE ADULT - PROBLEM SELECTOR PLAN 2
on 3LNC at rehab, currently requiring 5L nc with BIPAP, wean as tolerated  -on spiriva, xopenex, symbicort, montelukast,  inhaler PRN at rehab, continue  -hold theophylline given tachycardia and brief afib  -restart once better rate controlled  - BIPAP qhs and PRN  -of note pt is not on transplant list yet, is planning to go on list at [facility] if medically stable and improved on 3LNC at rehab, currently requiring 5L nc with BIPAP, wean as tolerated  -on spiriva, xopenex, symbicort, montelukast,  inhaler PRN at rehab, continue  -theophylline held given tachycardia and brief afib  - BIPAP qhs and PRN  -of note pt is not on transplant list yet, is planning to go on list at [facility] if medically stable and improved.

## 2020-10-08 NOTE — CONSULT NOTE ADULT - ASSESSMENT
60F w/ end stage COPD on 3LNC (trying to get on  transplant list at [facility]), former smoker, CAD s/p stent (2016), afib on eliquis, uncontrolled DM2 (on insulin), raynaud phenomenon and recent hospitalization at [facility] for confusion and [facility] p/w sob, admitted for acute on chronic resp failure with hypoxia and hyercarbia sec to multifocal PNA and COPD exacerabtion with end stage COPD.

## 2020-10-08 NOTE — PROGRESS NOTE ADULT - PROBLEM SELECTOR PLAN 4
on metformin and glargine 12 U qhs, hold metformin while in hospital  - glargine 10units qhs - tirated up from 8units in setting of steroids, hyperglycemia   - moderate dose ISS  - accuchecks, hypoglycemia protocol  - Hba1c: 6.0 on metformin and glargine 12 U qhs, hold metformin while in hospital  - glargine 10units qhs - tirated up from 8units in setting of steroids, hyperglycemia   - moderate dose ISS  - accuchecks, hypoglycemia protocol  - Hba1c: 6.0. on metformin and glargine 12 U qhs, hold metformin while in hospital  - glargine 10units qhs - tirated up from 8units in setting of steroids, hyperglycemia  - start humalog 3 units TID premeal   - moderate dose ISS  - accuchecks, hypoglycemia protocol  - Hba1c: 6.0.

## 2020-10-08 NOTE — PROGRESS NOTE ADULT - ASSESSMENT
Pt. with endstage COPD on transplant list, with acute pneumonia.  Continue antibiotics  Inhalers.  PT.

## 2020-10-08 NOTE — PROGRESS NOTE ADULT - SUBJECTIVE AND OBJECTIVE BOX
Patient is a 60y old  Female who presents with a chief complaint of COPD exacerbation, PNA (08 Oct 2020 09:10)      INTERVAL HPI/OVERNIGHT EVENTS: Patient seen and examined at bedside. No overnight events occurred. Patient has no complaints at this time. Denies fevers, chills, headache, lightheadedness, chest pain, dyspnea, abdominal pain, n/v/d/c.    MEDICATIONS  (STANDING):  apixaban 5 milliGRAM(s) Oral every 12 hours  ascorbic acid 500 milliGRAM(s) Oral daily  aspirin  chewable 81 milliGRAM(s) Oral daily  atorvastatin 80 milliGRAM(s) Oral at bedtime  azithromycin  IVPB 500 milliGRAM(s) IV Intermittent every 24 hours  budesonide 160 MICROgram(s)/formoterol 4.5 MICROgram(s) Inhaler 2 Puff(s) Inhalation two times a day  cefepime   IVPB 2000 milliGRAM(s) IV Intermittent every 12 hours  cholecalciferol 1000 Unit(s) Oral daily  dextrose 5%. 1000 milliLiter(s) (50 mL/Hr) IV Continuous <Continuous>  dextrose 50% Injectable 12.5 Gram(s) IV Push once  dextrose 50% Injectable 25 Gram(s) IV Push once  dextrose 50% Injectable 25 Gram(s) IV Push once  diltiazem    milliGRAM(s) Oral daily  ferrous    sulfate 325 milliGRAM(s) Oral daily  insulin glargine Injectable (LANTUS) 10 Unit(s) SubCutaneous at bedtime  insulin lispro (HumaLOG) corrective regimen sliding scale   SubCutaneous three times a day before meals  montelukast 10 milliGRAM(s) Oral at bedtime  multivitamin 1 Tablet(s) Oral daily  pantoprazole    Tablet 40 milliGRAM(s) Oral before breakfast  polyethylene glycol 3350 17 Gram(s) Oral daily  senna 2 Tablet(s) Oral at bedtime  tiotropium 18 MICROgram(s) Capsule 1 Capsule(s) Inhalation daily    MEDICATIONS  (PRN):  acetaminophen   Tablet .. 650 milliGRAM(s) Oral every 6 hours PRN Mild Pain (1 - 3)  bisacodyl Suppository 10 milliGRAM(s) Rectal daily PRN Constipation  dextrose 40% Gel 15 Gram(s) Oral once PRN Blood Glucose LESS THAN 70 milliGRAM(s)/deciliter  glucagon  Injectable 1 milliGRAM(s) IntraMuscular once PRN Glucose LESS THAN 70 milligrams/deciliter  guaiFENesin   Syrup  (Sugar-Free) 100 milliGRAM(s) Oral every 6 hours PRN Cough  lactulose Syrup 15 Gram(s) Oral two times a day PRN constipation  levalbuterol Inhalation 0.63 milliGRAM(s) Inhalation every 4 hours PRN shortness of breath and/or wheezing  ondansetron    Tablet 4 milliGRAM(s) Oral three times a day PRN Nausea and/or Vomiting  oxyCODONE    IR 5 milliGRAM(s) Oral every 6 hours PRN Moderate Pain (4 - 6)      Allergies    No Known Allergies    Intolerances    albuterol (Unknown)      REVIEW OF SYSTEMS:  CONSTITUTIONAL: No fever or chills  HEENT:  No headache, no sore throat  RESPIRATORY: No cough, wheezing, or shortness of breath  CARDIOVASCULAR: No chest pain, palpitations  GASTROINTESTINAL: No abd pain, nausea, vomiting, or diarrhea  GENITOURINARY: No dysuria, frequency, or hematuria  NEUROLOGICAL: no focal weakness or dizziness  MUSCULOSKELETAL: no myalgias     Vital Signs Last 24 Hrs  T(C): 36.3 (08 Oct 2020 04:42), Max: 36.6 (07 Oct 2020 12:44)  T(F): 97.3 (08 Oct 2020 04:42), Max: 97.8 (07 Oct 2020 12:44)  HR: 119 (08 Oct 2020 08:41) (90 - 119)  BP: 126/87 (08 Oct 2020 04:42) (115/72 - 159/72)  BP(mean): --  RR: 20 (08 Oct 2020 04:42) (18 - 22)  SpO2: 98% (08 Oct 2020 09:40) (95% - 98%)    PHYSICAL EXAM:  GENERAL: NAD  HEENT:  anicteric, moist mucous membranes  CHEST/LUNG:  CTA b/l, no rales, wheezes, or rhonchi  HEART:  RRR, S1, S2  ABDOMEN:  BS+, soft, nontender, nondistended  EXTREMITIES: no edema, cyanosis, or calf tenderness  NERVOUS SYSTEM: answers questions and follows commands appropriately    LABS:                        9.9    12.92 )-----------( 489      ( 08 Oct 2020 09:17 )             31.8     CBC Full  -  ( 08 Oct 2020 09:17 )  WBC Count : 12.92 K/uL  Hemoglobin : 9.9 g/dL  Hematocrit : 31.8 %  Platelet Count - Automated : 489 K/uL  Mean Cell Volume : 76.3 fl  Mean Cell Hemoglobin : 23.7 pg  Mean Cell Hemoglobin Concentration : 31.1 gm/dL  Auto Neutrophil # : 11.91 K/uL  Auto Lymphocyte # : 0.50 K/uL  Auto Monocyte # : 0.42 K/uL  Auto Eosinophil # : 0.00 K/uL  Auto Basophil # : 0.01 K/uL  Auto Neutrophil % : 92.1 %  Auto Lymphocyte % : 3.9 %  Auto Monocyte % : 3.3 %  Auto Eosinophil % : 0.0 %  Auto Basophil % : 0.1 %    08 Oct 2020 09:17    133    |  91     |  39     ----------------------------<  220    3.2     |  34     |  1.50     Ca    9.6        08 Oct 2020 09:17  Mg     2.0       08 Oct 2020 09:17    TPro  7.7    /  Alb  2.6    /  TBili  0.3    /  DBili  x      /  AST  20     /  ALT  13     /  AlkPhos  83     08 Oct 2020 09:17        CAPILLARY BLOOD GLUCOSE      POCT Blood Glucose.: 249 mg/dL (08 Oct 2020 08:06)  POCT Blood Glucose.: 220 mg/dL (07 Oct 2020 20:51)  POCT Blood Glucose.: 299 mg/dL (07 Oct 2020 16:49)  POCT Blood Glucose.: 353 mg/dL (07 Oct 2020 12:21)        Culture - Blood (collected 10-06-20 @ 09:23)  Source: .Blood Blood  Preliminary Report (10-07-20 @ 10:01):    No growth to date.    Culture - Blood (collected 10-06-20 @ 09:23)  Source: .Blood Blood  Preliminary Report (10-07-20 @ 10:01):    No growth to date.        RADIOLOGY & ADDITIONAL TESTS:    Personally reviewed.     Consultant(s) Notes Reviewed:  [x] YES  [ ] NO     Patient is a 60y old  Female who presents with a chief complaint of COPD exacerbation, PNA (08 Oct 2020 09:10)      INTERVAL HPI/OVERNIGHT EVENTS: Patient seen and examined at bedside. States that her shortness of breath is better today than yesterday; was talking comfortably with no visible SOB. She also stated that she gets nauseous sometimes and the zofran is helping. Patient was anxious and wanted a full list of her medications that she is getting at the hospital and also what time she is getting the medications. Pt was initially confused as she thought she was not on steroids and then was told she was on steroids states that her face gets red and puffy from steroids. She spoke with clinical pharmacy and they obtained a list of all her medications from her brother yesterday; will get in touch with ph As per cardio she went into afib with a HR in the 130s overnight when she went to bathroom; currently in sinus tachycardia. Denies fevers, chills, headache, lightheadedness, chest pain, abdominal pain, n/v/d/c. Last bowel movement was yesterday.    MEDICATIONS  (STANDING):  apixaban 5 milliGRAM(s) Oral every 12 hours  ascorbic acid 500 milliGRAM(s) Oral daily  aspirin  chewable 81 milliGRAM(s) Oral daily  atorvastatin 80 milliGRAM(s) Oral at bedtime  azithromycin  IVPB 500 milliGRAM(s) IV Intermittent every 24 hours  budesonide 160 MICROgram(s)/formoterol 4.5 MICROgram(s) Inhaler 2 Puff(s) Inhalation two times a day  cefepime   IVPB 2000 milliGRAM(s) IV Intermittent every 12 hours  cholecalciferol 1000 Unit(s) Oral daily  dextrose 5%. 1000 milliLiter(s) (50 mL/Hr) IV Continuous <Continuous>  dextrose 50% Injectable 12.5 Gram(s) IV Push once  dextrose 50% Injectable 25 Gram(s) IV Push once  dextrose 50% Injectable 25 Gram(s) IV Push once  diltiazem    milliGRAM(s) Oral daily  ferrous    sulfate 325 milliGRAM(s) Oral daily  insulin glargine Injectable (LANTUS) 10 Unit(s) SubCutaneous at bedtime  insulin lispro (HumaLOG) corrective regimen sliding scale   SubCutaneous three times a day before meals  montelukast 10 milliGRAM(s) Oral at bedtime  multivitamin 1 Tablet(s) Oral daily  pantoprazole    Tablet 40 milliGRAM(s) Oral before breakfast  polyethylene glycol 3350 17 Gram(s) Oral daily  senna 2 Tablet(s) Oral at bedtime  tiotropium 18 MICROgram(s) Capsule 1 Capsule(s) Inhalation daily    MEDICATIONS  (PRN):  acetaminophen   Tablet .. 650 milliGRAM(s) Oral every 6 hours PRN Mild Pain (1 - 3)  bisacodyl Suppository 10 milliGRAM(s) Rectal daily PRN Constipation  dextrose 40% Gel 15 Gram(s) Oral once PRN Blood Glucose LESS THAN 70 milliGRAM(s)/deciliter  glucagon  Injectable 1 milliGRAM(s) IntraMuscular once PRN Glucose LESS THAN 70 milligrams/deciliter  guaiFENesin   Syrup  (Sugar-Free) 100 milliGRAM(s) Oral every 6 hours PRN Cough  lactulose Syrup 15 Gram(s) Oral two times a day PRN constipation  levalbuterol Inhalation 0.63 milliGRAM(s) Inhalation every 4 hours PRN shortness of breath and/or wheezing  ondansetron    Tablet 4 milliGRAM(s) Oral three times a day PRN Nausea and/or Vomiting  oxyCODONE    IR 5 milliGRAM(s) Oral every 6 hours PRN Moderate Pain (4 - 6)      Allergies    No Known Allergies    Intolerances    albuterol (Unknown)      REVIEW OF SYSTEMS:  CONSTITUTIONAL: No fever or chills  HEENT:  No headache, no sore throat  RESPIRATORY: +SOB; No cough, wheezing,  CARDIOVASCULAR: No chest pain, palpitations  GASTROINTESTINAL: +nausea; No abd pain, vomiting, or diarrhea  GENITOURINARY: No dysuria, frequency, or hematuria  NEUROLOGICAL: no focal weakness or dizziness  MUSCULOSKELETAL: no myalgias       Vital Signs Last 24 Hrs  T(C): 36.3 (08 Oct 2020 04:42), Max: 36.6 (07 Oct 2020 12:44)  T(F): 97.3 (08 Oct 2020 04:42), Max: 97.8 (07 Oct 2020 12:44)  HR: 119 (08 Oct 2020 08:41) (90 - 119)  BP: 126/87 (08 Oct 2020 04:42) (115/72 - 159/72)  BP(mean): --  RR: 20 (08 Oct 2020 04:42) (18 - 22)  SpO2: 98% (08 Oct 2020 09:40) (95% - 98%)    PHYSICAL EXAM:  GENERAL: NAD, anxious   HEENT:  anicteric, EOMI b/l  CHEST/LUNG:  decreased breath sounds b/l; no rales, wheezes, or rhonchi  HEART:  RRR, S1, S2; no murmurs, rubs or gallops appreciated   ABDOMEN:  BS+, soft, nontender, nondistended  EXTREMITIES: no edema LE, cyanosis, or calf tenderness  NERVOUS SYSTEM: answers questions and follows commands appropriately      LABS:                        9.9    12.92 )-----------( 489      ( 08 Oct 2020 09:17 )             31.8     CBC Full  -  ( 08 Oct 2020 09:17 )  WBC Count : 12.92 K/uL  Hemoglobin : 9.9 g/dL  Hematocrit : 31.8 %  Platelet Count - Automated : 489 K/uL  Mean Cell Volume : 76.3 fl  Mean Cell Hemoglobin : 23.7 pg  Mean Cell Hemoglobin Concentration : 31.1 gm/dL  Auto Neutrophil # : 11.91 K/uL  Auto Lymphocyte # : 0.50 K/uL  Auto Monocyte # : 0.42 K/uL  Auto Eosinophil # : 0.00 K/uL  Auto Basophil # : 0.01 K/uL  Auto Neutrophil % : 92.1 %  Auto Lymphocyte % : 3.9 %  Auto Monocyte % : 3.3 %  Auto Eosinophil % : 0.0 %  Auto Basophil % : 0.1 %    08 Oct 2020 09:17    133    |  91     |  39     ----------------------------<  220    3.2     |  34     |  1.50     Ca    9.6        08 Oct 2020 09:17  Mg     2.0       08 Oct 2020 09:17    TPro  7.7    /  Alb  2.6    /  TBili  0.3    /  DBili  x      /  AST  20     /  ALT  13     /  AlkPhos  83     08 Oct 2020 09:17        CAPILLARY BLOOD GLUCOSE      POCT Blood Glucose.: 249 mg/dL (08 Oct 2020 08:06)  POCT Blood Glucose.: 220 mg/dL (07 Oct 2020 20:51)  POCT Blood Glucose.: 299 mg/dL (07 Oct 2020 16:49)  POCT Blood Glucose.: 353 mg/dL (07 Oct 2020 12:21)        Culture - Blood (collected 10-06-20 @ 09:23)  Source: .Blood Blood  Preliminary Report (10-07-20 @ 10:01):    No growth to date.    Culture - Blood (collected 10-06-20 @ 09:23)  Source: .Blood Blood  Preliminary Report (10-07-20 @ 10:01):    No growth to date.      Consultant(s) Notes Reviewed:  [x] YES  [ ] NO     Patient is a 60y old  Female who presents with a chief complaint of COPD exacerbation, PNA (08 Oct 2020 09:10)      INTERVAL HPI/OVERNIGHT EVENTS: Patient seen and examined at bedside. States that her shortness of breath is better today than yesterday; was talking comfortably with no visible SOB. As per cardio she went into afib with a HR in the 130s overnight when she went to bathroom; currently in sinus tachycardia. Denies fevers, chills, headache, lightheadedness, chest pain, abdominal pain, n/v/d/c. Last bowel movement was yesterday.     MEDICATIONS  (STANDING):  apixaban 5 milliGRAM(s) Oral every 12 hours  ascorbic acid 500 milliGRAM(s) Oral daily  aspirin  chewable 81 milliGRAM(s) Oral daily  atorvastatin 80 milliGRAM(s) Oral at bedtime  azithromycin  IVPB 500 milliGRAM(s) IV Intermittent every 24 hours  budesonide 160 MICROgram(s)/formoterol 4.5 MICROgram(s) Inhaler 2 Puff(s) Inhalation two times a day  cefepime   IVPB 2000 milliGRAM(s) IV Intermittent every 12 hours  cholecalciferol 1000 Unit(s) Oral daily  dextrose 5%. 1000 milliLiter(s) (50 mL/Hr) IV Continuous <Continuous>  dextrose 50% Injectable 12.5 Gram(s) IV Push once  dextrose 50% Injectable 25 Gram(s) IV Push once  dextrose 50% Injectable 25 Gram(s) IV Push once  diltiazem    milliGRAM(s) Oral daily  ferrous    sulfate 325 milliGRAM(s) Oral daily  insulin glargine Injectable (LANTUS) 10 Unit(s) SubCutaneous at bedtime  insulin lispro (HumaLOG) corrective regimen sliding scale   SubCutaneous three times a day before meals  montelukast 10 milliGRAM(s) Oral at bedtime  multivitamin 1 Tablet(s) Oral daily  pantoprazole    Tablet 40 milliGRAM(s) Oral before breakfast  polyethylene glycol 3350 17 Gram(s) Oral daily  senna 2 Tablet(s) Oral at bedtime  tiotropium 18 MICROgram(s) Capsule 1 Capsule(s) Inhalation daily    MEDICATIONS  (PRN):  acetaminophen   Tablet .. 650 milliGRAM(s) Oral every 6 hours PRN Mild Pain (1 - 3)  bisacodyl Suppository 10 milliGRAM(s) Rectal daily PRN Constipation  dextrose 40% Gel 15 Gram(s) Oral once PRN Blood Glucose LESS THAN 70 milliGRAM(s)/deciliter  glucagon  Injectable 1 milliGRAM(s) IntraMuscular once PRN Glucose LESS THAN 70 milligrams/deciliter  guaiFENesin   Syrup  (Sugar-Free) 100 milliGRAM(s) Oral every 6 hours PRN Cough  lactulose Syrup 15 Gram(s) Oral two times a day PRN constipation  levalbuterol Inhalation 0.63 milliGRAM(s) Inhalation every 4 hours PRN shortness of breath and/or wheezing  ondansetron    Tablet 4 milliGRAM(s) Oral three times a day PRN Nausea and/or Vomiting  oxyCODONE    IR 5 milliGRAM(s) Oral every 6 hours PRN Moderate Pain (4 - 6)      Allergies    No Known Allergies    Intolerances    albuterol (Unknown)      REVIEW OF SYSTEMS:  CONSTITUTIONAL: No fever or chills  HEENT:  No headache, no sore throat  RESPIRATORY: +SOB; No cough, wheezing,  CARDIOVASCULAR: No chest pain, palpitations  GASTROINTESTINAL: +nausea; No abd pain, vomiting, or diarrhea  GENITOURINARY: No dysuria, frequency, or hematuria  NEUROLOGICAL: no focal weakness or dizziness  MUSCULOSKELETAL: no myalgias       Vital Signs Last 24 Hrs  T(C): 36.3 (08 Oct 2020 04:42), Max: 36.6 (07 Oct 2020 12:44)  T(F): 97.3 (08 Oct 2020 04:42), Max: 97.8 (07 Oct 2020 12:44)  HR: 119 (08 Oct 2020 08:41) (90 - 119)  BP: 126/87 (08 Oct 2020 04:42) (115/72 - 159/72)  BP(mean): --  RR: 20 (08 Oct 2020 04:42) (18 - 22)  SpO2: 98% (08 Oct 2020 09:40) (95% - 98%)    PHYSICAL EXAM:  GENERAL: NAD, anxious   HEENT:  anicteric, EOMI b/l  CHEST/LUNG:  decreased breath sounds b/l; no rales, wheezes, or rhonchi  HEART:  RRR, S1, S2; no murmurs, rubs or gallops appreciated   ABDOMEN:  BS+, soft, nontender, nondistended  EXTREMITIES: no edema LE, cyanosis, or calf tenderness  NERVOUS SYSTEM: answers questions and follows commands appropriately      LABS:                        9.9    12.92 )-----------( 489      ( 08 Oct 2020 09:17 )             31.8     CBC Full  -  ( 08 Oct 2020 09:17 )  WBC Count : 12.92 K/uL  Hemoglobin : 9.9 g/dL  Hematocrit : 31.8 %  Platelet Count - Automated : 489 K/uL  Mean Cell Volume : 76.3 fl  Mean Cell Hemoglobin : 23.7 pg  Mean Cell Hemoglobin Concentration : 31.1 gm/dL  Auto Neutrophil # : 11.91 K/uL  Auto Lymphocyte # : 0.50 K/uL  Auto Monocyte # : 0.42 K/uL  Auto Eosinophil # : 0.00 K/uL  Auto Basophil # : 0.01 K/uL  Auto Neutrophil % : 92.1 %  Auto Lymphocyte % : 3.9 %  Auto Monocyte % : 3.3 %  Auto Eosinophil % : 0.0 %  Auto Basophil % : 0.1 %    08 Oct 2020 09:17    133    |  91     |  39     ----------------------------<  220    3.2     |  34     |  1.50     Ca    9.6        08 Oct 2020 09:17  Mg     2.0       08 Oct 2020 09:17    TPro  7.7    /  Alb  2.6    /  TBili  0.3    /  DBili  x      /  AST  20     /  ALT  13     /  AlkPhos  83     08 Oct 2020 09:17        CAPILLARY BLOOD GLUCOSE      POCT Blood Glucose.: 249 mg/dL (08 Oct 2020 08:06)  POCT Blood Glucose.: 220 mg/dL (07 Oct 2020 20:51)  POCT Blood Glucose.: 299 mg/dL (07 Oct 2020 16:49)  POCT Blood Glucose.: 353 mg/dL (07 Oct 2020 12:21)        Culture - Blood (collected 10-06-20 @ 09:23)  Source: .Blood Blood  Preliminary Report (10-07-20 @ 10:01):    No growth to date.    Culture - Blood (collected 10-06-20 @ 09:23)  Source: .Blood Blood  Preliminary Report (10-07-20 @ 10:01):    No growth to date.      Consultant(s) Notes Reviewed:  [x] YES  [ ] NO     Patient is a 60y old  Female who presents with a chief complaint of COPD exacerbation, PNA (08 Oct 2020 09:10)      INTERVAL HPI/OVERNIGHT EVENTS: Patient seen and examined at bedside. States that her shortness of breath is better today than yesterday; was talking comfortably with no visible SOB. As per cardio she went into afib with a HR in the 130s overnight when she went to bathroom; currently in sinus tachycardia. Denies fevers, chills, headache, lightheadedness, chest pain, abdominal pain, n/v/d/c. Last bowel movement was yesterday.     MEDICATIONS  (STANDING):  apixaban 5 milliGRAM(s) Oral every 12 hours  ascorbic acid 500 milliGRAM(s) Oral daily  aspirin  chewable 81 milliGRAM(s) Oral daily  atorvastatin 80 milliGRAM(s) Oral at bedtime  azithromycin  IVPB 500 milliGRAM(s) IV Intermittent every 24 hours  budesonide 160 MICROgram(s)/formoterol 4.5 MICROgram(s) Inhaler 2 Puff(s) Inhalation two times a day  cefepime   IVPB 2000 milliGRAM(s) IV Intermittent every 12 hours  cholecalciferol 1000 Unit(s) Oral daily  dextrose 5%. 1000 milliLiter(s) (50 mL/Hr) IV Continuous <Continuous>  dextrose 50% Injectable 12.5 Gram(s) IV Push once  dextrose 50% Injectable 25 Gram(s) IV Push once  dextrose 50% Injectable 25 Gram(s) IV Push once  diltiazem    milliGRAM(s) Oral daily  ferrous    sulfate 325 milliGRAM(s) Oral daily  insulin glargine Injectable (LANTUS) 10 Unit(s) SubCutaneous at bedtime  insulin lispro (HumaLOG) corrective regimen sliding scale   SubCutaneous three times a day before meals  montelukast 10 milliGRAM(s) Oral at bedtime  multivitamin 1 Tablet(s) Oral daily  pantoprazole    Tablet 40 milliGRAM(s) Oral before breakfast  polyethylene glycol 3350 17 Gram(s) Oral daily  senna 2 Tablet(s) Oral at bedtime  tiotropium 18 MICROgram(s) Capsule 1 Capsule(s) Inhalation daily    MEDICATIONS  (PRN):  acetaminophen   Tablet .. 650 milliGRAM(s) Oral every 6 hours PRN Mild Pain (1 - 3)  bisacodyl Suppository 10 milliGRAM(s) Rectal daily PRN Constipation  dextrose 40% Gel 15 Gram(s) Oral once PRN Blood Glucose LESS THAN 70 milliGRAM(s)/deciliter  glucagon  Injectable 1 milliGRAM(s) IntraMuscular once PRN Glucose LESS THAN 70 milligrams/deciliter  guaiFENesin   Syrup  (Sugar-Free) 100 milliGRAM(s) Oral every 6 hours PRN Cough  lactulose Syrup 15 Gram(s) Oral two times a day PRN constipation  levalbuterol Inhalation 0.63 milliGRAM(s) Inhalation every 4 hours PRN shortness of breath and/or wheezing  ondansetron    Tablet 4 milliGRAM(s) Oral three times a day PRN Nausea and/or Vomiting  oxyCODONE    IR 5 milliGRAM(s) Oral every 6 hours PRN Moderate Pain (4 - 6)      Allergies    No Known Allergies    Intolerances    albuterol (Unknown)      REVIEW OF SYSTEMS:  CONSTITUTIONAL: No fever or chills  HEENT:  No headache, no sore throat  RESPIRATORY: +SOB; No cough, wheezing,  CARDIOVASCULAR: No chest pain, palpitations  GASTROINTESTINAL: +nausea; No abd pain, vomiting, or diarrhea  GENITOURINARY: No dysuria, frequency, or hematuria  NEUROLOGICAL: no focal weakness or dizziness  MUSCULOSKELETAL: no myalgias       Vital Signs Last 24 Hrs  T(C): 36.3 (08 Oct 2020 04:42), Max: 36.6 (07 Oct 2020 12:44)  T(F): 97.3 (08 Oct 2020 04:42), Max: 97.8 (07 Oct 2020 12:44)  HR: 119 (08 Oct 2020 08:41) (90 - 119)  BP: 126/87 (08 Oct 2020 04:42) (115/72 - 159/72)  BP(mean): --  RR: 20 (08 Oct 2020 04:42) (18 - 22)  SpO2: 98% (08 Oct 2020 09:40) (95% - 98%)    PHYSICAL EXAM:  GENERAL: NAD, anxious   HEENT:  anicteric, EOMI b/l  CHEST/LUNG:  decreased breath sounds b/l however increased aeration, some  cough, no rales, wheezes, or rhonchi  HEART:  RRR, S1, S2; no murmurs, rubs or gallops appreciated   ABDOMEN:  BS+, soft, nontender, nondistended  EXTREMITIES: no edema LE, cyanosis, or calf tenderness  NERVOUS SYSTEM: answers questions and follows commands appropriately      LABS:                        9.9    12.92 )-----------( 489      ( 08 Oct 2020 09:17 )             31.8     CBC Full  -  ( 08 Oct 2020 09:17 )  WBC Count : 12.92 K/uL  Hemoglobin : 9.9 g/dL  Hematocrit : 31.8 %  Platelet Count - Automated : 489 K/uL  Mean Cell Volume : 76.3 fl  Mean Cell Hemoglobin : 23.7 pg  Mean Cell Hemoglobin Concentration : 31.1 gm/dL  Auto Neutrophil # : 11.91 K/uL  Auto Lymphocyte # : 0.50 K/uL  Auto Monocyte # : 0.42 K/uL  Auto Eosinophil # : 0.00 K/uL  Auto Basophil # : 0.01 K/uL  Auto Neutrophil % : 92.1 %  Auto Lymphocyte % : 3.9 %  Auto Monocyte % : 3.3 %  Auto Eosinophil % : 0.0 %  Auto Basophil % : 0.1 %    08 Oct 2020 09:17    133    |  91     |  39     ----------------------------<  220    3.2     |  34     |  1.50     Ca    9.6        08 Oct 2020 09:17  Mg     2.0       08 Oct 2020 09:17    TPro  7.7    /  Alb  2.6    /  TBili  0.3    /  DBili  x      /  AST  20     /  ALT  13     /  AlkPhos  83     08 Oct 2020 09:17        CAPILLARY BLOOD GLUCOSE      POCT Blood Glucose.: 249 mg/dL (08 Oct 2020 08:06)  POCT Blood Glucose.: 220 mg/dL (07 Oct 2020 20:51)  POCT Blood Glucose.: 299 mg/dL (07 Oct 2020 16:49)  POCT Blood Glucose.: 353 mg/dL (07 Oct 2020 12:21)        Culture - Blood (collected 10-06-20 @ 09:23)  Source: .Blood Blood  Preliminary Report (10-07-20 @ 10:01):    No growth to date.    Culture - Blood (collected 10-06-20 @ 09:23)  Source: .Blood Blood  Preliminary Report (10-07-20 @ 10:01):    No growth to date.      Consultant(s) Notes Reviewed:  [x] YES  [ ] NO

## 2020-10-08 NOTE — PROGRESS NOTE ADULT - PROBLEM SELECTOR PLAN 9
on oxycodone and tylenol PRN at rehab for chronic pain, continue  -apprec pallaiteve care colaboration on oxycodone and tylenol PRN at rehab for chronic pain, continue  -apprec palliative care collaboration

## 2020-10-08 NOTE — ADVANCED PRACTICE NURSE CONSULT - ASSESSMENT
Patient is a 60y old  Female who presents with a chief complaint of COPD exacerbation, PNA (08 Oct 2020 09:10)      Type 2 Dm on  Metformin 500mg po BID and  self monitors glucose,  denies need for diabetes supplies and disposition with home care presents with steroid induced hyperglycemia with  100% food intake on Lantus  10 units sq  hs and  Lispro moderate              PAST MEDICAL & SURGICAL HISTORY  Ascorbic acid deficiency  Iron deficiency anemia  constipation  GERD without esophagitis  Type 2 diabetes mellitus  HTN (hy  Heart failure  History of chronic atrial fibrillation  End stage COPD  Atrial fibrillation  Raynaud phenomenon  HTN   Claustrophobia  COPD         MEDICATIONS  (STANDING):  apixaban 5 milliGRAM(s) Oral every 12 hours  ascorbic acid 500 milliGRAM(s) Oral daily  aspirin  chewable 81 milliGRAM(s) Oral daily  atorvastatin 80 milliGRAM(s) Oral at bedtime  azithromycin  IVPB 500 milliGRAM(s) IV Intermittent every 24 hours  budesonide 160 MICROgram(s)/formoterol 4.5 MICROgram(s) Inhaler 2 Puff(s) Inhalation two times a day  cefepime   IVPB 2000 milliGRAM(s) IV Intermittent every 12 hours  cholecalciferol 1000 Unit(s) Oral daily  dextrose 5%. 1000 milliLiter(s) (50 mL/Hr) IV Continuous <  dextrose 50% Injectable 12.5 Gram(s) IV Push once  dextrose 50% Injectable 25 Gram(s) IV Push once  dextrose 50% Injectable 25 Gram(s) IV Push once  diltiazem    milliGRAM(s) Oral daily  ferrous    sulfate 325 milliGRAM(s) Oral daily  insulin glargine Injectable (LANTUS) 10 Unit(s) SubCutaneous at bedtime  insulin lispro (HumaLOG) corrective regimen sliding scale   SubCutaneous three times a day before meals  montelukast 10 milliGRAM(s) Oral at bedtime  multivitamin 1 Tablet(s) Oral daily  pantoprazole    Tablet 40 milliGRAM(s) Oral before breakfast  polyethylene glycol 3350 17 Gram(s) Oral daily  senna 2 Tablet(s) Oral at bedtime  tiotropium 18 MICROgram(s) Capsule 1 Capsule(s) Inhalation daily    Allergies    No Known Allergies    Intolerances    albuterol (Unknown)    Vital Signs Last 24 Hrs  T(C): 36.3 (08 Oct 2020 04:42),   T(F): 97.3 (08 Oct 2020 04:42),   HR: 119 (08 Oct 2020 08:41)   BP: 126/87 (08 Oct 2020 04:42)   RR: 20 (08 Oct 2020 04:42) (18 - 22)  SpO2: 98% (08 Oct 2020 09:40) (95% - 98%)    10-08    133<L>  |  91<L>  |  39<H>  ----------------------------<  220<H>  3.2<L>   |  34<H>  |  1.50<H>        Anion Gap, Serum: 8 mmol/L (10-08-20 @ 09:17)  eGFR if Non African American: 37 mL/min/1.73M2 <L> (10-08-20         POCT Blood Glucose.: 249 mg/dL (08 Oct 2020 08:06)  POCT Blood Glucose.: 220 mg/dL (07 Oct 2020 20:51)  POCT Blood Glucose.: 299 mg/dL (07 Oct 2020 16:49)  POCT Blood Glucose.: 353 mg/dL (07 Oct 2020 12:21)

## 2020-10-08 NOTE — PHARMACOTHERAPY INTERVENTION NOTE - COMMENTS
COPD Patient Transitions of Care Counseling  Counseled by ([facility] name): Dileep Ackerman, Pharm. D.  Person(s) counseled: (translation used? Family member by phone?, if relevant) patient, at bedside  Counseling materials provided/counseling aids used:  American [employer] Association Action plan, Micromedex [place]otes  Time spent Counselin minutes  Counseling provided according to ASHP guidelines including side effects  Notes: Reviewed all current meds with patient including indications

## 2020-10-08 NOTE — PROGRESS NOTE ADULT - PROBLEM SELECTOR PLAN 7
EKG showing sinus tachycardia  - on eliquis 5mg bid and cardizem 180mg qd at rehab, continue  - Per tele -  pt had went into afib and converted into sinus tachycardia, hold theophylline and monitor on remote, consider tele upgrade if continues arrythmic patter, apprec pharmD Dr Ackerman collaboration in therapeutic management and discussion/education with patient  - Cardio consulted, f/u recs EKG showing sinus tachycardia  - continue eliquis 5mg bid and cardizem 180mg qd at rehab  - Per tele -  pt had went into afib and converted into sinus tachycardia, hold theophylline and monitor on remote, consider tele upgrade if continues arrhythmic patter, apprec pharmD Dr Ackerman collaboration in therapeutic management and discussion/education with patient  - Cardio consulted, recs appreciated EKG showing sinus tachycardia  - on eliquis 5mg bid and cardizem 180mg qd at rehab  - Per tele -  pt had went into afib and converted into sinus tachycardia, hold theophylline and monitor on remote, consider tele upgrade if continues arrhythmic patter, apprec pharmD Dr Ackerman collaboration in therapeutic management and discussion/education with patient  - D/w Cardio (Dr. Whitley) - increase Cardizem to 240 mg qd

## 2020-10-09 LAB
ALBUMIN SERPL ELPH-MCNC: 2.5 G/DL — LOW (ref 3.3–5)
ALP SERPL-CCNC: 81 U/L — SIGNIFICANT CHANGE UP (ref 40–120)
ALT FLD-CCNC: 21 U/L — SIGNIFICANT CHANGE UP (ref 12–78)
ANION GAP SERPL CALC-SCNC: 3 MMOL/L — LOW (ref 5–17)
AST SERPL-CCNC: 21 U/L — SIGNIFICANT CHANGE UP (ref 15–37)
BASOPHILS # BLD AUTO: 0 K/UL — SIGNIFICANT CHANGE UP (ref 0–0.2)
BASOPHILS NFR BLD AUTO: 0 % — SIGNIFICANT CHANGE UP (ref 0–2)
BILIRUB SERPL-MCNC: 0.2 MG/DL — SIGNIFICANT CHANGE UP (ref 0.2–1.2)
BUN SERPL-MCNC: 53 MG/DL — HIGH (ref 7–23)
CALCIUM SERPL-MCNC: 9.2 MG/DL — SIGNIFICANT CHANGE UP (ref 8.5–10.1)
CHLORIDE SERPL-SCNC: 97 MMOL/L — SIGNIFICANT CHANGE UP (ref 96–108)
CHLORIDE UR-SCNC: <20 MMOL/L — SIGNIFICANT CHANGE UP
CO2 SERPL-SCNC: 37 MMOL/L — HIGH (ref 22–31)
CREAT ?TM UR-MCNC: 93 MG/DL — SIGNIFICANT CHANGE UP
CREAT ?TM UR-MCNC: 96 MG/DL — SIGNIFICANT CHANGE UP
CREAT SERPL-MCNC: 1.9 MG/DL — HIGH (ref 0.5–1.3)
EOSINOPHIL # BLD AUTO: 0.13 K/UL — SIGNIFICANT CHANGE UP (ref 0–0.5)
EOSINOPHIL NFR BLD AUTO: 1 % — SIGNIFICANT CHANGE UP (ref 0–6)
FERRITIN SERPL-MCNC: 96 NG/ML — SIGNIFICANT CHANGE UP (ref 15–150)
GLUCOSE SERPL-MCNC: 239 MG/DL — HIGH (ref 70–99)
GRAM STN FLD: SIGNIFICANT CHANGE UP
HCT VFR BLD CALC: 31.9 % — LOW (ref 34.5–45)
HGB BLD-MCNC: 9.6 G/DL — LOW (ref 11.5–15.5)
IRON SATN MFR SERPL: 16 % — SIGNIFICANT CHANGE UP (ref 14–50)
IRON SATN MFR SERPL: 38 UG/DL — SIGNIFICANT CHANGE UP (ref 30–160)
KAPPA LC SER QL IFE: 1.61 MG/DL — SIGNIFICANT CHANGE UP (ref 0.33–1.94)
KAPPA/LAMBDA FREE LIGHT CHAIN RATIO, SERUM: 1.08 RATIO — SIGNIFICANT CHANGE UP (ref 0.26–1.65)
LAMBDA LC SER QL IFE: 1.49 MG/DL — SIGNIFICANT CHANGE UP (ref 0.57–2.63)
LYMPHOCYTES # BLD AUTO: 1.02 K/UL — SIGNIFICANT CHANGE UP (ref 1–3.3)
LYMPHOCYTES # BLD AUTO: 8 % — LOW (ref 13–44)
MAGNESIUM SERPL-MCNC: 2.1 MG/DL — SIGNIFICANT CHANGE UP (ref 1.6–2.6)
MCHC RBC-ENTMCNC: 24.1 PG — LOW (ref 27–34)
MCHC RBC-ENTMCNC: 30.1 GM/DL — LOW (ref 32–36)
MCV RBC AUTO: 79.9 FL — LOW (ref 80–100)
MONOCYTES # BLD AUTO: 0.9 K/UL — SIGNIFICANT CHANGE UP (ref 0–0.9)
MONOCYTES NFR BLD AUTO: 7 % — SIGNIFICANT CHANGE UP (ref 2–14)
NEUTROPHILS # BLD AUTO: 10.5 K/UL — HIGH (ref 1.8–7.4)
NEUTROPHILS NFR BLD AUTO: 82 % — HIGH (ref 43–77)
NRBC # BLD: SIGNIFICANT CHANGE UP /100 WBCS (ref 0–0)
OSMOLALITY UR: 454 MOSM/KG — SIGNIFICANT CHANGE UP (ref 50–1200)
PHOSPHATE SERPL-MCNC: 2.9 MG/DL — SIGNIFICANT CHANGE UP (ref 2.5–4.5)
PLATELET # BLD AUTO: 504 K/UL — HIGH (ref 150–400)
POTASSIUM SERPL-MCNC: 5 MMOL/L — SIGNIFICANT CHANGE UP (ref 3.5–5.3)
POTASSIUM SERPL-SCNC: 5 MMOL/L — SIGNIFICANT CHANGE UP (ref 3.5–5.3)
PROT ?TM UR-MCNC: 33 MG/DL — HIGH (ref 0–12)
PROT SERPL-MCNC: 7.2 G/DL — SIGNIFICANT CHANGE UP (ref 6–8.3)
PROT/CREAT UR-RTO: 0.4 RATIO — HIGH (ref 0–0.2)
RBC # BLD: 3.99 M/UL — SIGNIFICANT CHANGE UP (ref 3.8–5.2)
RBC # FLD: 16.2 % — HIGH (ref 10.3–14.5)
SODIUM SERPL-SCNC: 137 MMOL/L — SIGNIFICANT CHANGE UP (ref 135–145)
SODIUM UR-SCNC: <20 MMOL/L — SIGNIFICANT CHANGE UP
SPECIMEN SOURCE: SIGNIFICANT CHANGE UP
TIBC SERPL-MCNC: 236 UG/DL — SIGNIFICANT CHANGE UP (ref 220–430)
UIBC SERPL-MCNC: 198 UG/DL — SIGNIFICANT CHANGE UP (ref 110–370)
UUN UR-MCNC: 864 MG/DL — SIGNIFICANT CHANGE UP
WBC # BLD: 12.81 K/UL — HIGH (ref 3.8–10.5)
WBC # FLD AUTO: 12.81 K/UL — HIGH (ref 3.8–10.5)

## 2020-10-09 PROCEDURE — 99232 SBSQ HOSP IP/OBS MODERATE 35: CPT

## 2020-10-09 PROCEDURE — 99233 SBSQ HOSP IP/OBS HIGH 50: CPT

## 2020-10-09 PROCEDURE — 99233 SBSQ HOSP IP/OBS HIGH 50: CPT | Mod: GC

## 2020-10-09 PROCEDURE — 76775 US EXAM ABDO BACK WALL LIM: CPT | Mod: 26

## 2020-10-09 RX ORDER — CEFTRIAXONE 500 MG/1
1000 INJECTION, POWDER, FOR SOLUTION INTRAMUSCULAR; INTRAVENOUS EVERY 24 HOURS
Refills: 0 | Status: COMPLETED | OUTPATIENT
Start: 2020-10-09 | End: 2020-10-13

## 2020-10-09 RX ORDER — IPRATROPIUM BROMIDE 0.2 MG/ML
500 SOLUTION, NON-ORAL INHALATION EVERY 6 HOURS
Refills: 0 | Status: DISCONTINUED | OUTPATIENT
Start: 2020-10-09 | End: 2020-10-22

## 2020-10-09 RX ORDER — ALBUMIN HUMAN 25 %
50 VIAL (ML) INTRAVENOUS ONCE
Refills: 0 | Status: COMPLETED | OUTPATIENT
Start: 2020-10-09 | End: 2020-10-10

## 2020-10-09 RX ORDER — AZITHROMYCIN 500 MG/1
250 TABLET, FILM COATED ORAL DAILY
Refills: 0 | Status: COMPLETED | OUTPATIENT
Start: 2020-10-09 | End: 2020-10-10

## 2020-10-09 RX ADMIN — TIOTROPIUM BROMIDE 1 CAPSULE(S): 18 CAPSULE ORAL; RESPIRATORY (INHALATION) at 21:46

## 2020-10-09 RX ADMIN — CEFEPIME 100 MILLIGRAM(S): 1 INJECTION, POWDER, FOR SOLUTION INTRAMUSCULAR; INTRAVENOUS at 05:33

## 2020-10-09 RX ADMIN — APIXABAN 5 MILLIGRAM(S): 2.5 TABLET, FILM COATED ORAL at 05:34

## 2020-10-09 RX ADMIN — BUDESONIDE AND FORMOTEROL FUMARATE DIHYDRATE 2 PUFF(S): 160; 4.5 AEROSOL RESPIRATORY (INHALATION) at 18:15

## 2020-10-09 RX ADMIN — Medication 6: at 08:30

## 2020-10-09 RX ADMIN — Medication 8: at 12:42

## 2020-10-09 RX ADMIN — Medication 40 MILLIGRAM(S): at 05:34

## 2020-10-09 RX ADMIN — ATORVASTATIN CALCIUM 80 MILLIGRAM(S): 80 TABLET, FILM COATED ORAL at 21:36

## 2020-10-09 RX ADMIN — Medication 500 MICROGRAM(S): at 20:46

## 2020-10-09 RX ADMIN — Medication 500 MICROGRAM(S): at 13:49

## 2020-10-09 RX ADMIN — BUDESONIDE AND FORMOTEROL FUMARATE DIHYDRATE 2 PUFF(S): 160; 4.5 AEROSOL RESPIRATORY (INHALATION) at 05:34

## 2020-10-09 RX ADMIN — Medication 3 UNIT(S): at 08:31

## 2020-10-09 RX ADMIN — CEFTRIAXONE 100 MILLIGRAM(S): 500 INJECTION, POWDER, FOR SOLUTION INTRAMUSCULAR; INTRAVENOUS at 17:26

## 2020-10-09 RX ADMIN — MONTELUKAST 10 MILLIGRAM(S): 4 TABLET, CHEWABLE ORAL at 21:38

## 2020-10-09 RX ADMIN — Medication 325 MILLIGRAM(S): at 12:42

## 2020-10-09 RX ADMIN — Medication 3 UNIT(S): at 18:02

## 2020-10-09 RX ADMIN — INSULIN GLARGINE 10 UNIT(S): 100 INJECTION, SOLUTION SUBCUTANEOUS at 21:36

## 2020-10-09 RX ADMIN — Medication 1000 UNIT(S): at 12:42

## 2020-10-09 RX ADMIN — LEVALBUTEROL 0.63 MILLIGRAM(S): 1.25 SOLUTION, CONCENTRATE RESPIRATORY (INHALATION) at 06:08

## 2020-10-09 RX ADMIN — Medication 3 UNIT(S): at 12:43

## 2020-10-09 RX ADMIN — SENNA PLUS 2 TABLET(S): 8.6 TABLET ORAL at 21:36

## 2020-10-09 RX ADMIN — Medication 1 TABLET(S): at 12:42

## 2020-10-09 RX ADMIN — Medication 2: at 18:02

## 2020-10-09 RX ADMIN — Medication 240 MILLIGRAM(S): at 05:34

## 2020-10-09 RX ADMIN — AZITHROMYCIN 250 MILLIGRAM(S): 500 TABLET, FILM COATED ORAL at 14:12

## 2020-10-09 RX ADMIN — PANTOPRAZOLE SODIUM 40 MILLIGRAM(S): 20 TABLET, DELAYED RELEASE ORAL at 05:34

## 2020-10-09 RX ADMIN — POLYETHYLENE GLYCOL 3350 17 GRAM(S): 17 POWDER, FOR SOLUTION ORAL at 12:42

## 2020-10-09 RX ADMIN — Medication 81 MILLIGRAM(S): at 12:42

## 2020-10-09 RX ADMIN — APIXABAN 5 MILLIGRAM(S): 2.5 TABLET, FILM COATED ORAL at 17:26

## 2020-10-09 NOTE — PROGRESS NOTE ADULT - ATTENDING COMMENTS
60F w/ end stage COPD on 3LNC (trying to get on  transplant list at [facility]), former smoker, CAD s/p stent (2016), afib on eliquis, uncontrolled DM2 (on insulin), raynaud phenomenon and recent hospitalization at [facility] for confusion and [facility] p/w sob, admitted for acute on chronic resp failure with hypoxia and hyercarbia sec to multifocal PNA and COPD exacerabtion with end stage COPD. Plan: cont iv antibx as per ID, apprec pulm collaboration, apprec renal recs for worsening renal function, meds discussed and plan of care, pt verbalizes understanding and will cont to monitor clinical course

## 2020-10-09 NOTE — PROGRESS NOTE ADULT - PROBLEM SELECTOR PLAN 7
EKG showing sinus tachycardia  - on eliquis 5mg bid and cardizem 180mg qd at rehab  - Per tele -  pt had went into afib and converted into sinus tachycardia, hold theophylline and monitor on remote, consider tele upgrade if continues arrhythmic patter, apprec pharmD Dr Ackerman collaboration in therapeutic management and discussion/education with patient  - D/w Cardio (Dr. Whitley) - increase Cardizem to 240 mg qd EKG showing sinus tachycardia  - on eliquis 5mg bid and cardizem 240mg qd (c  - Per tele -  pt had went into afib and converted into sinus tachycardia, hold theophylline and monitor on remote, consider tele upgrade if continues arrhythmic patter, apprec pharmD Dr Ackerman collaboration in therapeutic management and discussion/education with patient  - D/w Cardio (Dr. Whitley) - increase Cardizem to 240 mg qd  -Cardio rec (Dr. Richardson): consider restarting Duoneb; EKG showing sinus tachycardia  - on eliquis 5mg bid and cardizem 240mg qd (home meds)  - hold theophylline and monitor on remote tele, consider tele upgrade if continues arrhythmic patter, apprec pharmD Dr Ackerman collaboration in therapeutic management and discussion/education with patient  - D/w Cardio (Dr. Whitley) - increase Cardizem to 240 mg qd  -Cardio rec (Dr. Richardson): consider restarting Duoneb; continue eliquis and cardiazem; PT for deconditioning

## 2020-10-09 NOTE — PROGRESS NOTE ADULT - SUBJECTIVE AND OBJECTIVE BOX
PULMONARY/CRITICAL CARE  More sob overnite.    Wore cpap hs.   Asked to take over pulmonary care.     Patient is a 60y old  Female who presents with a chief complaint of COPD exacerbation, PNA (06 Oct 2020 14:10)    BRIEF HOSPITAL COURSE: ***61y/o woman with end stage COPD on 3L O2 at home awaiting transplant at [facility], former smoker, CAD s/p stent, afib, DM2), raynaud phenomenon and recent hospitalization at [facility] for confusion and [facility] with worsening of SOB. He is very short of breath at rest, worst with any movement, unable to complete her sentences. No fever. Has some cough but no sputum at this time.   CXR done showing b/l patchy infiltrates,   CT chest done showing multifocal PNA and reactive mediastinal lymphadenopathy  She has been started on azithromycin and ceftriaxone and ID was called for further recommendation.   Pt. improved today.  Uses CPAP hs and prn.  No sputum.    Events last 24 hours: ***    PAST MEDICAL & SURGICAL HISTORY:  H/O Raynaud&#x27;s syndrome    Ascorbic acid deficiency    Iron deficiency anemia    Constipation    GERD without esophagitis    Type 2 diabetes mellitus    HTN (hypertension)    Heart failure    HLD (hyperlipidemia)    History of chronic atrial fibrillation  on Eliquis    End stage COPD  on 3LNC    History of tonsillectomy      Allergies    No Known Allergies    Intolerances      FAMILY HISTORY:  FH: myocardial infarction    Family history of CABG            Medications:  azithromycin  IVPB 500 milliGRAM(s) IV Intermittent every 24 hours  piperacillin/tazobactam IVPB.. 3.375 Gram(s) IV Intermittent every 8 hours    buMETAnide 1 milliGRAM(s) Oral two times a day  diltiazem    milliGRAM(s) Oral daily    albuterol/ipratropium for Nebulization 3 milliLiter(s) Nebulizer every 3 hours PRN  budesonide 160 MICROgram(s)/formoterol 4.5 MICROgram(s) Inhaler 2 Puff(s) Inhalation two times a day  guaiFENesin   Syrup  (Sugar-Free) 100 milliGRAM(s) Oral every 6 hours PRN  montelukast 10 milliGRAM(s) Oral at bedtime  theophylline ER Capsule 300 milliGRAM(s) Oral <User Schedule>  tiotropium 18 MICROgram(s) Capsule 1 Capsule(s) Inhalation daily    acetaminophen   Tablet .. 650 milliGRAM(s) Oral every 6 hours PRN  oxyCODONE    IR 5 milliGRAM(s) Oral every 6 hours PRN      apixaban 5 milliGRAM(s) Oral every 12 hours  aspirin  chewable 81 milliGRAM(s) Oral daily    bisacodyl Suppository 10 milliGRAM(s) Rectal daily PRN  lactulose Syrup 15 Gram(s) Oral two times a day PRN  pantoprazole    Tablet 40 milliGRAM(s) Oral before breakfast  polyethylene glycol 3350 17 Gram(s) Oral daily  senna 2 Tablet(s) Oral at bedtime      atorvastatin 80 milliGRAM(s) Oral at bedtime  dextrose 40% Gel 15 Gram(s) Oral once PRN  dextrose 50% Injectable 12.5 Gram(s) IV Push once  dextrose 50% Injectable 25 Gram(s) IV Push once  dextrose 50% Injectable 25 Gram(s) IV Push once  glucagon  Injectable 1 milliGRAM(s) IntraMuscular once PRN  insulin glargine Injectable (LANTUS) 10 Unit(s) SubCutaneous at bedtime  insulin lispro (HumaLOG) corrective regimen sliding scale   SubCutaneous three times a day before meals  methylPREDNISolone sodium succinate Injectable 40 milliGRAM(s) IV Push daily    ascorbic acid 500 milliGRAM(s) Oral daily  cholecalciferol 1000 Unit(s) Oral daily  dextrose 5%. 1000 milliLiter(s) IV Continuous <Continuous>  ferrous    sulfate 325 milliGRAM(s) Oral daily  magnesium sulfate  IVPB 1 Gram(s) IV Intermittent once  multivitamin 1 Tablet(s) Oral daily                ICU Vital Signs Last 24 Hrs  T(C): 36.4 (07 Oct 2020 06:29), Max: 36.7 (06 Oct 2020 09:27)  T(F): 97.5 (07 Oct 2020 06:29), Max: 98.1 (06 Oct 2020 12:34)  HR: 84 (07 Oct 2020 08:09) (54 - 100)  BP: 110/68 (07 Oct 2020 06:29) (110/68 - 149/68)  BP(mean): --  ABP: --  ABP(mean): --  RR: 18 (07 Oct 2020 06:29) (18 - 22)  SpO2: 97% (07 Oct 2020 08:09) (90% - 98%)    Vital Signs Last 24 Hrs  T(C): 36.4 (07 Oct 2020 06:29), Max: 36.7 (06 Oct 2020 09:27)  T(F): 97.5 (07 Oct 2020 06:29), Max: 98.1 (06 Oct 2020 12:34)  HR: 84 (07 Oct 2020 08:09) (54 - 100)  BP: 110/68 (07 Oct 2020 06:29) (110/68 - 149/68)  BP(mean): --  RR: 18 (07 Oct 2020 06:29) (18 - 22)  SpO2: 97% (07 Oct 2020 08:09) (90% - 98%)        I&O's Detail        LABS:                        10.3   8.87  )-----------( 513      ( 07 Oct 2020 06:55 )             33.0     10-07    131<L>  |  86<L>  |  32<H>  ----------------------------<  230<H>  3.6   |  36<H>  |  1.40<H>    Ca    9.6      07 Oct 2020 06:55  Phos  4.7     10-07  Mg     1.5     10-07    TPro  7.8  /  Alb  2.6<L>  /  TBili  0.3  /  DBili  x   /  AST  19  /  ALT  13  /  AlkPhos  88  10-07          CAPILLARY BLOOD GLUCOSE      POCT Blood Glucose.: 269 mg/dL (07 Oct 2020 08:18)        CULTURES:      Physical Examination:    General: mild sob.    HEENT: Pupils equal, reactive to light.  Symmetric.    PULM: decreased bs few rhonchi, no change percussion no rales, wheeze    CVS: Regular rate and rhythm, no murmurs, rubs, or gallops    ABD: Soft, nondistended, nontender, normoactive bowel sounds, no masses    EXT: No edema, nontender    SKIN: Warm and well perfused, no rashes noted.    NEURO: Alert, oriented, interactive, nonfocal    RADIOLOGY: ***d< from: CT Chest No Cont (10.06.20 @ 04:52) >  EXAM:  CT CHEST                            PROCEDURE DATE:  10/06/2020          INTERPRETATION:  CLINICAL INFORMATION: Rule out pneumonia. Chronic obstructive pulmonary disease.    COMPARISON: Same day radiograph    PROCEDURE:  CT of the Chest was performed without intravenous contrast.  Sagittal and coronal reformats were performed. 3-D MIPS images were acquired on the axial plane.    FINDINGS:    LUNGS AND AIRWAYS: Patent central airways.  Centrilobular emphysema in both lungs. Numerous nodular densities with septal thickening showing tree-in-bud appearance in the right upper lobe, right middle lobe, left upper lobe and left lower lobe. There is no atelectasis.  PLEURA: No pleural effusion.  MEDIASTINUM AND LILIANA: 1.4 cm sized right paratracheal lymph node. A few subcentimeter mediastinal lymph nodes.  VESSELS: Moderate atherosclerotic calcification.  HEART: Heart size is normal. No pericardial effusion.  CHEST WALL AND LOWER NECK: Within normal limits.  VISUALIZED UPPER ABDOMEN: Within normal limits.  BONES: Degenerative change.    IMPRESSION:  Multifocal pneumonia involving both lungs as described.  Reactive mediastinal lymphadenopathy.              ROBBIE LEAL M.D., ATTENDING RADIOLOGIST  This document has been electronically signed. Oct  6 2020  5:12AM    < end of copied text >      < from: Xray Chest 1 View- PORTABLE-Routine (Xray Chest 1 View- PORTABLE-Routine in AM.) (10.08.20 @ 09:55) >  EXAM:  XR CHEST PORTABLE ROUTINE 1V                            PROCEDURE DATE:  10/08/2020          INTERPRETATION:  Chest portable.    Clinical History: Shortness of breath. Pneumonia.    Comparison: 10/6/2020.    Single AP view submitted.    The evaluation of the cardiomediastinal silhouette is limited on portable technique.    Paucity of lung markings in the upper lung zones is compatible with emphysematous disease.    Prominent reticulonodular opacities right mid and lower lung zone  There is probable bronchial wall thickening at the bilateral lower lung zones.  No lobar lung consolidation is noted.    Impression:    Findings as discussed above.    < end of copied text >

## 2020-10-09 NOTE — PROGRESS NOTE ADULT - SUBJECTIVE AND OBJECTIVE BOX
[facility] -- Adriana Gonzales, Funmilayo Fuentes, Dylan Whitley Savella, Goodger: Office # [phone]    Follow Up:  SOB    Subjective/Observations: Patient seen and examined. Patient awake, alert, resting in bed. Patient upset that she is not getting better, Admits that she is slightly better since admission, but still gets SOB with any activity and is unable to evven walk short distance. She is also upset that she has reaction to Xopenex She thinks duo nebs work better. Admits to palpitations from both nebs. No chest pain, No dizziness or lightheadedness.     REVIEW OF SYSTEMS: All review of systems is negative for eye, ENT, GI, , allergic, dermatologic, musculoskeletal and neurologic except as described above    PAST MEDICAL & SURGICAL HISTORY:  H/O Raynaud&#x27;s syndrome  Ascorbic acid deficiency  Iron deficiency anemia  Constipation  GERD without esophagitis  Type 2 diabetes mellitus  HTN (hypertension)  Heart failure  History of chronic atrial fibrillation on Eliquis  End stage COPD on 3LNC  Atrial fibrillation  Hyperlipemia  Chronic sinusitis  Raynaud phenomenon  DM (diabetes mellitus)  Claustrophobia  COPD (chronic obstructive pulmonary disease)  History of tonsillectomy  S/P tonsillectomy    MEDICATIONS  (STANDING):  apixaban 5 milliGRAM(s) Oral every 12 hours  aspirin  chewable 81 milliGRAM(s) Oral daily  atorvastatin 80 milliGRAM(s) Oral at bedtime  azithromycin  IVPB 500 milliGRAM(s) IV Intermittent every 24 hours  budesonide 160 MICROgram(s)/formoterol 4.5 MICROgram(s) Inhaler 2 Puff(s) Inhalation two times a day  cefepime   IVPB 2000 milliGRAM(s) IV Intermittent every 12 hours  cholecalciferol 1000 Unit(s) Oral daily  dextrose 5%. 1000 milliLiter(s) (50 mL/Hr) IV Continuous <Continuous>  dextrose 50% Injectable 12.5 Gram(s) IV Push once  dextrose 50% Injectable 25 Gram(s) IV Push once  dextrose 50% Injectable 25 Gram(s) IV Push once  diltiazem    milliGRAM(s) Oral daily  ferrous    sulfate 325 milliGRAM(s) Oral daily  insulin glargine Injectable (LANTUS) 10 Unit(s) SubCutaneous at bedtime  insulin lispro (HumaLOG) corrective regimen sliding scale   SubCutaneous three times a day before meals  insulin lispro Injectable (HumaLOG) 3 Unit(s) SubCutaneous three times a day before meals  ipratropium    for Nebulization 500 MICROGram(s) Nebulizer every 6 hours  montelukast 10 milliGRAM(s) Oral at bedtime  multivitamin 1 Tablet(s) Oral daily  pantoprazole    Tablet 40 milliGRAM(s) Oral before breakfast  polyethylene glycol 3350 17 Gram(s) Oral daily  predniSONE   Tablet 40 milliGRAM(s) Oral daily  senna 2 Tablet(s) Oral at bedtime  tiotropium 18 MICROgram(s) Capsule 1 Capsule(s) Inhalation daily    MEDICATIONS  (PRN):  acetaminophen   Tablet .. 650 milliGRAM(s) Oral every 6 hours PRN Mild Pain (1 - 3)  bisacodyl Suppository 10 milliGRAM(s) Rectal daily PRN Constipation  dextrose 40% Gel 15 Gram(s) Oral once PRN Blood Glucose LESS THAN 70 milliGRAM(s)/deciliter  glucagon  Injectable 1 milliGRAM(s) IntraMuscular once PRN Glucose LESS THAN 70 milligrams/deciliter  guaiFENesin   Syrup  (Sugar-Free) 100 milliGRAM(s) Oral every 6 hours PRN Cough  lactulose Syrup 15 Gram(s) Oral two times a day PRN constipation  oxyCODONE    IR 5 milliGRAM(s) Oral every 6 hours PRN Moderate Pain (4 - 6)    Allergies  No Known Allergies    Intolerances  albuterol (Unknown)    Vital Signs Last 24 Hrs  T(C): 36.7 (09 Oct 2020 05:40), Max: 36.7 (09 Oct 2020 05:40)  T(F): 98.1 (09 Oct 2020 05:40), Max: 98.1 (09 Oct 2020 05:40)  HR: 94 (09 Oct 2020 06:08) (67 - 105)  BP: 137/85 (09 Oct 2020 05:40) (126/80 - 137/85)  BP(mean): --  RR: 18 (09 Oct 2020 05:40) (18 - 20)  SpO2: 100% (09 Oct 2020 06:08) (92% - 100%)    I&O's Summary    08 Oct 2020 07:01  -  09 Oct 2020 07:00  --------------------------------------------------------  IN: 300 mL / OUT: 0 mL / NET: 300 mL    TELE:  90-110s   PHYSICAL EXAM:  Appearance: NAD, no distress, alert, Frail   HEENT: Moist Mucous Membranes, Anicteric  Cardiovascular: Regular rate and rhythm, Normal S1 S2, No JVD, No murmurs, No rubs, gallops or clicks  Respiratory: Non-labored, Clear to auscultation, Diminished No rales, No rhonchi, No wheezing.   Gastrointestinal:  Soft, Non-tender, + BS  Neurologic: Non-focal  Skin: Warm and dry, No visible rashes or ulcers, No ecchymosis, No cyanosis  Musculoskeletal: No clubbing, No cyanosis, No joint swelling/tenderness  Psychiatry: Mood & affect appropriate  Lymph: No peripheral edema.     LABS: All Labs Reviewed:                        9.6    12.81 )-----------( 504      ( 09 Oct 2020 07:32 )             31.9                         9.9    12.92 )-----------( 489      ( 08 Oct 2020 09:17 )             31.8                         10.3   8.87  )-----------( 513      ( 07 Oct 2020 06:55 )             33.0     09 Oct 2020 07:32    137    |  97     |  53     ----------------------------<  239    5.0     |  37     |  1.90   08 Oct 2020 09:17    133    |  91     |  39     ----------------------------<  220    3.2     |  34     |  1.50   07 Oct 2020 06:55    131    |  86     |  32     ----------------------------<  230    3.6     |  36     |  1.40     Ca    9.2        09 Oct 2020 07:32  Ca    9.6        08 Oct 2020 09:17  Ca    9.6        07 Oct 2020 06:55  Phos  2.9       09 Oct 2020 07:32  Phos  4.7       07 Oct 2020 06:55  Mg     2.1       09 Oct 2020 07:32  Mg     2.0       08 Oct 2020 09:17  Mg     1.5       07 Oct 2020 06:55    TPro  7.2    /  Alb  2.5    /  TBili  0.2    /  DBili  x      /  AST  21     /  ALT  21     /  AlkPhos  81     09 Oct 2020 07:32  TPro  7.7    /  Alb  2.6    /  TBili  0.3    /  DBili  x      /  AST  20     /  ALT  13     /  AlkPhos  83     08 Oct 2020 09:17  TPro  7.8    /  Alb  2.6    /  TBili  0.3    /  DBili  x      /  AST  19     /  ALT  13     /  AlkPhos  88     07 Oct 2020 06:55    12 Lead ECG:   Ventricular Rate 109 BPM  Atrial Rate 109 BPM  P-R Interval 150 ms  QRS Duration 136 ms  Q-T Interval 390 ms  QTC Calculation(Bazett) 525 ms  P Axis 56 degrees  R Axis -16 degrees  T Axis 57 degrees  Diagnosis Line Sinus tachycardia with premature supraventricular complexes  Right bundle branch block  Abnormal ECG  Confirmed by LUIS ENRIQUE WHITLEY ([id number]) on 10/8/2020 11:41:04 AM (10-08-20 @ 08:51)

## 2020-10-09 NOTE — PROGRESS NOTE ADULT - PROBLEM SELECTOR PLAN 6
recent TTE in 08/2020 showing normal LVEF, stage 1 diastolic dysfunction  - hold bumex 2/2 to [facility]  -TTE (10/6/20) LVEF of 55%   - caution with excessive IVF

## 2020-10-09 NOTE — PROGRESS NOTE ADULT - PROBLEM SELECTOR PLAN 1
likely multifactorial from multifocal PNA and COPD exacerbation  - CXR: b/l lung infiltrates; emphysema   - CT chest done showing multifocal PNA  - VB.34/68/>40/31  - switch solumedrol 40 mg IV daily to PO   - discontinued duonebs q6h standing with albuterol inh q4h PRN b/c tachycardia & brief a.fib   - on spiriva, xopenex, symbicort, montelukast,  inhaler PRN   - ID consulted (Dr. Zhu) recs appreciated, - Blood culture NGTD; f/u sputum culture, Legionella U ag; stop zosyn not suspecting anaerobes; switch to cefepime 2gm q12 (creat 1.4); continue azithromycin 500mg daily  - currently on NC with BIPAP maintain O2 >/ 88  - f/u strep pneumo and legionella antigen  - TTE (10/6/20) LVEF of 55% - no vegetations appreciated   - pulm Dr. Rojas consulted, recs appreciated - Bipap, steroids, inhalers  - apprec palliative care support in management due to complexity of care and nature of history. likely multifactorial from multifocal PNA and COPD exacerbation  - CXR: b/l lung infiltrates; emphysema   - CT chest done showing multifocal PNA  - VB.34/68/>40/31  - continue PO solumedrol 40 mg daily   - discontinued duonebs q6h standing with albuterol inh q4h PRN b/c tachycardia & brief a.fib   - on spiriva, symbicort, montelukast,  inhaler PRN   - ID consulted (Dr. Zhu) recs appreciated, - Blood culture NGTD; f/u sputum culture, Legionella U ag; stop zosyn not suspecting anaerobes; switch to cefepime 2gm q12 (creat 1.4); continue azithromycin 500mg daily  - currently on NC with BIPAP maintain O2 >/ 88  - f/u strep pneumo; legionella neg  - TTE (10/6/20) LVEF of 55% - no vegetations appreciated   - pulm Dr. Rojas consulted, recs appreciated: Was more sob overnite, but cxr improved; Continue antibiotics; trial atrovent hhn  - apprec palliative care support in management due to complexity of care and nature of history likely multifactorial from multifocal PNA and COPD exacerbation  - CXR: b/l lung infiltrates; emphysema   - CT chest done showing multifocal PNA  - VB.34/68/>40/31  - continue PO solumedrol 40 mg daily   - discontinued duonebs q6h standing with albuterol inh q4h PRN b/c tachycardia & brief a.fib   - on spiriva, symbicort, montelukast,  inhaler PRN   - ID consulted (Dr. Zhu) recs appreciated, - Blood culture NGTD; f/u sputum culture; switch to ceftriaxone 1gm daily and azithromycin 250mg daily will change to oral  - currently on NC with BIPAP maintain O2 >/ 88  - legionella neg  -sputum cx: few gram positive cocci   - TTE (10/6/20) LVEF of 55% - no vegetations appreciated   - pulm Dr. Rojas consulted, recs appreciated: Was more sob overnite, but cxr improved; Continue antibiotics; trial atrovent hhn  - apprec palliative care support in management due to complexity of care and nature of history likely multifactorial from multifocal PNA and COPD exacerbation  - CXR: b/l lung infiltrates; emphysema   - CT chest done showing multifocal PNA  - continue PO solumedrol 40 mg daily   - on spiriva, symbicort, montelukast,  inhaler PRN   - ID consulted (Dr. Zhu) recs appreciated, - Blood culture NGTD; f/u sputum culture; switch to ceftriaxone 1gm daily and azithromycin 250mg daily will change to oral  - currently on NC with BIPAP maintain O2 >/ 88  - legionella neg  -sputum cx: few gram positive cocci   - TTE (10/6/20) LVEF of 55% - no vegetations appreciated   - pulm Dr. Rojas consulted, recs appreciated: trial atrovent hhn  - apprec palliative care support in management due to complexity of care and nature of history

## 2020-10-09 NOTE — PROGRESS NOTE ADULT - PROBLEM SELECTOR PLAN 2
on 3LNC at rehab, currently requiring 5L nc with BIPAP, wean as tolerated  -on spiriva, xopenex, symbicort, montelukast,  inhaler PRN at rehab, continue  -theophylline held given tachycardia and brief afib  - BIPAP qhs and PRN  -of note pt is not on transplant list yet, is planning to go on list at [facility] if medically stable and improved. on 3LNC at rehab, currently requiring 5L nc with BIPAP, wean as tolerated  -on spiriva, symbicort, montelukast,  inhaler PRN at rehab, continue  -theophylline held given tachycardia and brief afib  - BIPAP qhs and PRN  -of note pt is not on transplant list yet, is planning to go on list at [facility] if medically stable and improved.

## 2020-10-09 NOTE — PROGRESS NOTE ADULT - PROBLEM SELECTOR PLAN 9
on oxycodone and tylenol PRN at rehab for chronic pain, continue  -apprec palliative care collaboration

## 2020-10-09 NOTE — PROGRESS NOTE ADULT - ASSESSMENT
AURORA on CKD 2  Hyponatremia  Hypokalemia  COPD  Hypercalcemia     -BLCr 1  -AURORA unclear etiology, clinically ATN  -UA - 3-5 WBC, trace ketones, 30 proteins  -FeUrea 34.6%  -UPC 0.44  -Ur osm 454  -Corrected calcium 10.2, with paraprotein gap and anemia,  Will check FLC and SPEP  -Iron replete  -Bumex on hold, metformin on hold  -Change to Low K, Low Phos diet  -Discussed the possibility of dialysis with patient  -Less likely AIN given paucity of WBC on UA and no peripheral eosinophils  -Will give small dose of albumin  -Strict I&Os    Attempted to call Dr. Mathew [phone], however office was closed.  Discussed with patient indepth.

## 2020-10-09 NOTE — PROGRESS NOTE ADULT - ASSESSMENT
60F w/ end stage COPD on 3LNC (pending transplant list at [facility]), former smoker, CAD s/p stent (2016), Afib on Eliquis,  uncontrolled DM2 (on insulin), raynaud phenomenon and recent hospitalization at [facility] for confusion and AURORA p/w sob, admitted for COPD exacerbation and multifocal PNA    CAD s/p stent   - Continue asa, statin  - Denies ischemic symptoms   - EKG showed SR @ 109, RBBB    Paroxysmal Afib  - She continues to have tachy episodes with HR in the 110s but ST, SR with rates 90s mostly   - She is deconditioned and has significant COPD/pneumonia contributing to her increased rates  - Continue Eliquis 5mg  - Continue Cardizem   - TTE w/ mild concentric LVH grossly nL LVSF ef 55%, mild MR    COPD/Pneumonia  - CT chest noted  - per pulm/ID  - Continue steroids and antibiotics     VTE ppx  - Continue apixaban      - Monitor and replete lytes, keep K>4, Mg>2.  - All other medical needs as per primary team.  - Other cardiovascular workup will depend on clinical course.  - Will continue to follow.    Lisa Soto, MS FNP, AGAP  Nurse Practitioner- Cardiology   Spectra #[phone]   60F w/ end stage COPD on 3LNC (pending transplant list at [facility]), former smoker, CAD s/p stent (2016), Afib on Eliquis,  uncontrolled DM2 (on insulin), raynaud phenomenon and recent hospitalization at [facility] for confusion and AURORA p/w sob, admitted for COPD exacerbation and multifocal PNA    CAD s/p stent   - Continue asa, statin  - Denies ischemic symptoms   - EKG showed SR @ 109, RBBB    Paroxysmal Afib  - She continues to have tachy episodes with HR in the 110s but ST, SR with rates 90s mostly   - She is deconditioned and has significant COPD/pneumonia contributing to her increased rates  - Can consider restarting Duoneb as patient not tolerating Xopenex given patient elevated rates are sinus tachy and are more likely related to her COPD and pneumonia   - Continue Eliquis 5mg  - Continue Cardizem   - TTE w/ mild concentric LVH grossly nL LVSF ef 55%, mild MR    COPD/Pneumonia  - CT chest noted  - per pulm/ID  - Continue steroids and antibiotics     VTE ppx  - Continue apixaban      - Monitor and replete lytes, keep K>4, Mg>2.  - All other medical needs as per primary team.  - Other cardiovascular workup will depend on clinical course.  - Will continue to follow.    Lisa Soto, MS FNP, [facility]  Nurse Practitioner- Cardiology   Spectra #[phone]

## 2020-10-09 NOTE — PROGRESS NOTE ADULT - SUBJECTIVE AND OBJECTIVE BOX
[facility]  INFECTIOUS DISEASES   =======================================================    [facility]-[id number]  CHERI HARTMAN     Follow up: COPD exacerbation, Pneumonia    She states that she feels worse, no cough or sputum.   No fever.  CXR somewhat improved.     PAST MEDICAL & SURGICAL HISTORY:  H/O Raynaud&#x27;s syndrome  Ascorbic acid deficiency  Iron deficiency anemia  Constipation  GERD without esophagitis  Type 2 diabetes mellitus  HTN (hypertension)  Heart failure  HLD (hyperlipidemia)  History of chronic atrial fibrillation  on Eliquis  End stage COPD  on 3LNC  History of tonsillectomy    Social Hx: former heavy smoker, no ETOH or drugs     FAMILY HISTORY:  FH: myocardial infarction    Family history of CABG    Allergies  No Known Allergies    Antibiotics:  Azithromycin   Cefepime     REVIEW OF SYSTEMS:  CONSTITUTIONAL:  No Fever or chills  HEENT:  No diplopia or blurred vision.  No sore throat or runny nose.  CARDIOVASCULAR:  No chest pain or SOB.  RESPIRATORY:  +cough, severe SOB  GASTROINTESTINAL:  No nausea, vomiting or diarrhea.  GENITOURINARY:  No dysuria, frequency or urgency. No Blood in urine  MUSCULOSKELETAL:  no joint aches, no muscle pain  SKIN:  No change in skin, hair or nails.  NEUROLOGIC:  No paresthesias, fasciculations, seizures or weakness.  PSYCHIATRIC:  No disorder of thought or mood.  ENDOCRINE:  No heat or cold intolerance, polyuria or polydipsia.  HEMATOLOGICAL:  No easy bruising or bleeding.     Physical Exam:  Vital Signs Last 24 Hrs  T(C): 36.7 (09 Oct 2020 05:40), Max: 36.7 (09 Oct 2020 05:40)  T(F): 98.1 (09 Oct 2020 05:40), Max: 98.1 (09 Oct 2020 05:40)  HR: 94 (09 Oct 2020 06:08) (67 - 105)  BP: 137/85 (09 Oct 2020 05:40) (126/80 - 137/85)  BP(mean): --  RR: 18 (09 Oct 2020 05:40) (18 - 20)  SpO2: 100% (09 Oct 2020 06:08) (92% - 100%))  GEN: NAD  HEENT: normocephalic and atraumatic. EOMI. PERRL.    NECK: Supple.  No lymphadenopathy   LUNGS: very poor air movement but Clear to auscultation with no wheezing or rales .  HEART: Regular rate and rhythm   ABDOMEN: Soft, nontender, and nondistended.  Positive bowel sounds.    : No CVA tenderness  EXTREMITIES: Without any cyanosis, clubbing, rash, lesions or edema.  NEUROLOGIC: grossly intact.  PSYCHIATRIC: Appropriate affect .  SKIN: No ulceration or induration present.    Labs:                        9.6    12.81 )-----------( 504      ( 09 Oct 2020 07:32 )             31.9      10-09    137  |  97  |  53<H>  ----------------------------<  239<H>  5.0   |  37<H>  |  1.90<H>    Ca    9.2      09 Oct 2020 07:32  Phos  2.9     10-09  Mg     2.1     10-09    TPro  7.2  /  Alb  2.5<L>  /  TBili  0.2  /  DBili  x   /  AST  21  /  ALT  21  /  AlkPhos  81  10-09      Culture - Blood (collected 10-06-20 @ 09:23)  Source: .Blood Blood    Culture - Blood (collected 10-06-20 @ 09:23)  Source: .Blood Blood    WBC Count: 12.81 K/uL (10-09-20 @ 07:32)  WBC Count: 12.92 K/uL (10-08-20 @ 09:17)  WBC Count: 8.87 K/uL (10-07-20 @ 06:55)  WBC Count: 11.41 K/uL (10-06-20 @ 02:51)    Creatinine, Serum: 1.90 mg/dL (10-09-20 @ 07:32)  Creatinine, Serum: 1.50 mg/dL (10-08-20 @ 09:17)  Creatinine, Serum: 1.40 mg/dL (10-07-20 @ 06:55)  Creatinine, Serum: 1.00 mg/dL (10-06-20 @ 02:51)    C-Reactive Protein, Serum: 26.32 mg/dL (10-06-20 @ 09:01)    COVID-19 PCR: NotDetec (10-06-20 @ 03:23)    Assessment and Plan:   59y/o woman with end stage COPD on 3L O2 at home awaiting transplant at [facility], former smoker, CAD s/p stent, afib, DM2), raynaud phenomenon and recent hospitalization at [facility] for confusion and AURORA has been sent from [facility] with worsening of SOB. CT with multilobar opacities. Due to recent hospitalization and rehab stay, will cover for possible HCAP.   Very high risk with a poor lung capacities.     COPD, Pneumonia  - Blood culture NGTD  - Sputum culture testing   - Legionella U ag negative   - CXR and CT with multilobar opacities, repeat CXR with some improvement   - Respiratory and pulmonary consults noted, regular nebulizer treatment and inhalers.   - TTE, result noted, EF=55%, no pulmonary edema suspected   - Continue cefepime 2gm q12 (creat 1.9), total 7days  - Continue azithromycin 500mg daily will treat for 5days total.     Will follow.    Wagner Juarez MD  Division of Infectious Diseases   Cell [phone] between 7am and 5pm   After 5pm and weekends please call ID service at [phone].     Attending Attestation:   Plan discussed with Dr. Ruiz.           [facility]  INFECTIOUS DISEASES   =======================================================    [facility]-[id number]  CHERI HARTMAN     Follow up: COPD exacerbation, Pneumonia    She states that she feels worse, no cough or sputum.   No fever.  CXR somewhat improved.     PAST MEDICAL & SURGICAL HISTORY:  H/O Raynaud&#x27;s syndrome  Ascorbic acid deficiency  Iron deficiency anemia  Constipation  GERD without esophagitis  Type 2 diabetes mellitus  HTN (hypertension)  Heart failure  HLD (hyperlipidemia)  History of chronic atrial fibrillation  on Eliquis  End stage COPD  on 3LNC  History of tonsillectomy    Social Hx: former heavy smoker, no ETOH or drugs     FAMILY HISTORY:  FH: myocardial infarction    Family history of CABG    Allergies  No Known Allergies    Antibiotics:  Azithromycin   Cefepime     REVIEW OF SYSTEMS:  CONSTITUTIONAL:  No Fever or chills  HEENT:  No diplopia or blurred vision.  No sore throat or runny nose.  CARDIOVASCULAR:  No chest pain or SOB.  RESPIRATORY:  +cough, severe SOB  GASTROINTESTINAL:  No nausea, vomiting or diarrhea.  GENITOURINARY:  No dysuria, frequency or urgency. No Blood in urine  MUSCULOSKELETAL:  no joint aches, no muscle pain  SKIN:  No change in skin, hair or nails.  NEUROLOGIC:  No paresthesias, fasciculations, seizures or weakness.  PSYCHIATRIC:  No disorder of thought or mood.  ENDOCRINE:  No heat or cold intolerance, polyuria or polydipsia.  HEMATOLOGICAL:  No easy bruising or bleeding.     Physical Exam:  Vital Signs Last 24 Hrs  T(C): 36.7 (09 Oct 2020 05:40), Max: 36.7 (09 Oct 2020 05:40)  T(F): 98.1 (09 Oct 2020 05:40), Max: 98.1 (09 Oct 2020 05:40)  HR: 94 (09 Oct 2020 06:08) (67 - 105)  BP: 137/85 (09 Oct 2020 05:40) (126/80 - 137/85)  BP(mean): --  RR: 18 (09 Oct 2020 05:40) (18 - 20)  SpO2: 100% (09 Oct 2020 06:08) (92% - 100%))  GEN: NAD  HEENT: normocephalic and atraumatic. EOMI. PERRL.    NECK: Supple.  No lymphadenopathy   LUNGS: very poor air movement but Clear to auscultation with no wheezing or rales .  HEART: Regular rate and rhythm   ABDOMEN: Soft, nontender, and nondistended.  Positive bowel sounds.    : No CVA tenderness  EXTREMITIES: Without any cyanosis, clubbing, rash, lesions or edema.  NEUROLOGIC: grossly intact.  PSYCHIATRIC: Appropriate affect .  SKIN: No ulceration or induration present.    Labs:                        9.6    12.81 )-----------( 504      ( 09 Oct 2020 07:32 )             31.9      10-09    137  |  97  |  53<H>  ----------------------------<  239<H>  5.0   |  37<H>  |  1.90<H>    Ca    9.2      09 Oct 2020 07:32  Phos  2.9     10-09  Mg     2.1     10-09    TPro  7.2  /  Alb  2.5<L>  /  TBili  0.2  /  DBili  x   /  AST  21  /  ALT  21  /  AlkPhos  81  10-09      Culture - Blood (collected 10-06-20 @ 09:23)  Source: .Blood Blood    Culture - Blood (collected 10-06-20 @ 09:23)  Source: .Blood Blood    WBC Count: 12.81 K/uL (10-09-20 @ 07:32)  WBC Count: 12.92 K/uL (10-08-20 @ 09:17)  WBC Count: 8.87 K/uL (10-07-20 @ 06:55)  WBC Count: 11.41 K/uL (10-06-20 @ 02:51)    Creatinine, Serum: 1.90 mg/dL (10-09-20 @ 07:32)  Creatinine, Serum: 1.50 mg/dL (10-08-20 @ 09:17)  Creatinine, Serum: 1.40 mg/dL (10-07-20 @ 06:55)  Creatinine, Serum: 1.00 mg/dL (10-06-20 @ 02:51)    C-Reactive Protein, Serum: 26.32 mg/dL (10-06-20 @ 09:01)    COVID-19 PCR: NotDetec (10-06-20 @ 03:23)    Assessment and Plan:   59y/o woman with end stage COPD on 3L O2 at home awaiting transplant at [facility], former smoker, CAD s/p stent, afib, DM2), raynaud phenomenon and recent hospitalization at [facility] for confusion and AURORA has been sent from [facility] with worsening of SOB. CT with multilobar opacities. Due to recent hospitalization and rehab stay, will cover for possible HCAP.   Very high risk with a poor lung capacities.     COPD, Pneumonia  - Blood culture NGTD  - Sputum culture testing   - Legionella U ag negative   - CXR and CT with multilobar opacities, repeat CXR with some improvement   - Respiratory and pulmonary consults noted, regular nebulizer treatment and inhalers.   - TTE, result noted, EF=55%, no pulmonary edema suspected   - Switch to ceftriaxone 1gm daily  - Continue azithromycin 250mg daily will change to oral, tomorrow will complete 5days    Will follow.    Wagner Juarez MD  Division of Infectious Diseases   Cell [phone] between 7am and 5pm   After 5pm and weekends please call ID service at [phone].     Attending Attestation:   Plan discussed with Dr. Ruiz.

## 2020-10-09 NOTE — PROGRESS NOTE ADULT - ATTENDING COMMENTS
I saw and examined the patient personally. Spoke with above provider regarding this case. I reviewed the above findings completely.  I agree with the above history, physical, and plan which I have edited where appropriate. I saw and examined the patient personally. Spoke with above provider regarding this case. I reviewed the above findings completely.  I agree with the above history, physical, and plan which I have edited where appropriate.    complaining of chest tightness with xopenex. would stop and cont duoneb which appears to work for her. Can tolerate some mild sinus tachycardia if it allows her pulmonary issues to be better addressed.

## 2020-10-09 NOTE — PROGRESS NOTE ADULT - PROBLEM SELECTOR PLAN 10
On Eliquis 5mg bid, no need for further pharmacologic DVT ppx    11. Hypomagnesemia - resolved   - s/p Magnesium IVPB 1g given x 1

## 2020-10-09 NOTE — PROGRESS NOTE ADULT - SUBJECTIVE AND OBJECTIVE BOX
Patient is a 60y old  Female who presents with a chief complaint of COPD exacerbation, PNA (08 Oct 2020 11:37)       HPI:  60F w/ end stage COPD on 3LNC (pending transplant list at [facility]), former smoker, CAD s/p stent (2016), afib on eliquis, uncontrolled DM2 (on insulin), raynaud phenomenon and recent hospitalization at [facility] for confusion and [facility] p/w sob. [facility]. Patient states that she has had worsening shortness of breath for past two days. Unable to obtain comprehensive history due to dyspnea. Patient denies fever, chills, sore throat, cough, chest pain, palpitations, abd pain, n/v. Currently feels short of breath even after receiving duonebs and steroids in the ED. Unable to keep mask on during interview and lay back in stretcher due to subjective sob. Patient repeatedly stating that it is too hot in her room and fanning herself with a paper. Per chart, Dr. Mathew (PCP) has chart notes regarding possible hallucinations. Would like her home medications now but otherwise has no acute complaints.  Has not seen nephrologist.  Denies any N/V.  SOB improving.  States she is urinating well.  Denies any urinary retention.  Denies any supplement or NSAID usage.         Emotional, but no new complaints.  Still urinating well.     PAST MEDICAL & SURGICAL HISTORY:  H/O Raynaud&#x27;s syndrome    Ascorbic acid deficiency    Iron deficiency anemia    Constipation    GERD without esophagitis    Type 2 diabetes mellitus    HTN (hypertension)    Heart failure    HLD (hyperlipidemia)    History of chronic atrial fibrillation  on Eliquis    End stage COPD  on 3LNC    Atrial fibrillation    Hyperlipemia    Chronic sinusitis    Raynaud phenomenon    HTN (hypertension)    DM (diabetes mellitus)    Claustrophobia    COPD (chronic obstructive pulmonary disease)    History of tonsillectomy    S/P tonsillectomy         FAMILY HISTORY:  FH: myocardial infarction    Family history of CABG    FH: MI (myocardial infarction)    FH: CABG (coronary artery bypass surgery)    NC    Social History:Non smoker    MEDICATIONS  (STANDING):  apixaban 5 milliGRAM(s) Oral every 12 hours  ascorbic acid 500 milliGRAM(s) Oral daily  aspirin  chewable 81 milliGRAM(s) Oral daily  atorvastatin 80 milliGRAM(s) Oral at bedtime  azithromycin  IVPB 500 milliGRAM(s) IV Intermittent every 24 hours  budesonide 160 MICROgram(s)/formoterol 4.5 MICROgram(s) Inhaler 2 Puff(s) Inhalation two times a day  cefepime   IVPB 2000 milliGRAM(s) IV Intermittent every 12 hours  cholecalciferol 1000 Unit(s) Oral daily  dextrose 5%. 1000 milliLiter(s) (50 mL/Hr) IV Continuous <Continuous>  dextrose 50% Injectable 12.5 Gram(s) IV Push once  dextrose 50% Injectable 25 Gram(s) IV Push once  dextrose 50% Injectable 25 Gram(s) IV Push once  ferrous    sulfate 325 milliGRAM(s) Oral daily  insulin glargine Injectable (LANTUS) 10 Unit(s) SubCutaneous at bedtime  insulin lispro (HumaLOG) corrective regimen sliding scale   SubCutaneous three times a day before meals  insulin lispro Injectable (HumaLOG) 3 Unit(s) SubCutaneous three times a day before meals  montelukast 10 milliGRAM(s) Oral at bedtime  multivitamin 1 Tablet(s) Oral daily  pantoprazole    Tablet 40 milliGRAM(s) Oral before breakfast  polyethylene glycol 3350 17 Gram(s) Oral daily  potassium chloride    Tablet ER 40 milliEquivalent(s) Oral every 4 hours  senna 2 Tablet(s) Oral at bedtime  tiotropium 18 MICROgram(s) Capsule 1 Capsule(s) Inhalation daily    MEDICATIONS  (PRN):  acetaminophen   Tablet .. 650 milliGRAM(s) Oral every 6 hours PRN Mild Pain (1 - 3)  bisacodyl Suppository 10 milliGRAM(s) Rectal daily PRN Constipation  dextrose 40% Gel 15 Gram(s) Oral once PRN Blood Glucose LESS THAN 70 milliGRAM(s)/deciliter  glucagon  Injectable 1 milliGRAM(s) IntraMuscular once PRN Glucose LESS THAN 70 milligrams/deciliter  guaiFENesin   Syrup  (Sugar-Free) 100 milliGRAM(s) Oral every 6 hours PRN Cough  lactulose Syrup 15 Gram(s) Oral two times a day PRN constipation  levalbuterol Inhalation 0.63 milliGRAM(s) Inhalation every 4 hours PRN shortness of breath and/or wheezing  oxyCODONE    IR 5 milliGRAM(s) Oral every 6 hours PRN Moderate Pain (4 - 6)   Meds reviewed    Allergies    No Known Allergies    Intolerances    albuterol (Unknown)       REVIEW OF SYSTEMS:    CONSTITUTIONAL:  No weight loss   EYES: No eye pain, visual disturbances, or discharge  ENMT:  No difficulty hearing, tinnitus, vertigo; No sinus or throat pain  NECK: No pain or stiffness  BREASTS: No pain, masses, or nipple discharge  RESPIRATORY: +SOB. No wheeze. No NIELSON  CARDIOVASCULAR: No chest pain, palpitations, dizziness,   GASTROINTESTINAL: No abdominal or epigastric pain. No nausea, vomiting, or hematemesis;  GENITOURINARY: No dysuria, frequency, hematuria, or incontinence  NEUROLOGICAL: No headaches, memory loss, loss of strength, numbness, or tremors  SKIN: Diffuse erythema, no blisters  LYMPH NODES: No enlarged glands  ENDOCRINE: No heat or cold intolerance; No hair loss  MUSCULOSKELETAL: No joint pain or swelling   PSYCHIATRIC: No depression, anxiety, mood swings, or difficulty sleeping  ALLERY AND IMMUNOLOGIC: No hives or eczema      ICU Vital Signs Last 24 Hrs  T(C): 36.8 (09 Oct 2020 13:34), Max: 36.8 (09 Oct 2020 13:34)  T(F): 98.3 (09 Oct 2020 13:34), Max: 98.3 (09 Oct 2020 13:34)  HR: 93 (09 Oct 2020 13:49) (67 - 104)  BP: 139/75 (09 Oct 2020 13:34) (132/76 - 139/75)  BP(mean): --  ABP: --  ABP(mean): --  RR: 18 (09 Oct 2020 13:34) (18 - 20)  SpO2: 93% (09 Oct 2020 13:49) (93% - 100%)        PHYSICAL EXAM:    GENERAL: NAD  HEAD:  Atraumatic, Normocephalic  EYES: EOMI, conjunctiva and sclera clear  ENMT: No drainage from nares, no drainage from pinna  NECK: Supple, neck  veins full  NERVOUS SYSTEM:  Alert & Oriented X3, Good concentration; Motor Strength wnl upper and lower extremities  CHEST/LUNG: Decreased BS, L rales, no rhonchi, wheezing, or rubs  HEART: Regular rate and rhythm; No murmurs, rubs, or gallops  ABDOMEN: Soft, Nontender, Nondistended; Bowel sounds present,  EXTREMITIES: trace Edema  SKIN: No rashes or lesions, pale      LABS:                                              9.6    12.81 )-----------( 504      ( 09 Oct 2020 07:32 )             31.9     10-09    137  |  97  |  53<H>  ----------------------------<  239<H>  5.0   |  37<H>  |  1.90<H>    Ca    9.2      09 Oct 2020 07:32  Phos  2.9     10-09  Mg     2.1     10-09    TPro  7.2  /  Alb  2.5<L>  /  TBili  0.2  /  DBili  x   /  AST  21  /  ALT  21  /  AlkPhos  81  10-09      Urinalysis Basic - ( 08 Oct 2020 14:18 )    Color: Yellow / Appearance: Clear / S.015 / pH: x  Gluc: x / Ketone: Trace  / Bili: Negative / Urobili: Negative   Blood: x / Protein: 30 mg/dL / Nitrite: Negative   Leuk Esterase: Negative / RBC: 0-2 /HPF / WBC 3-5   Sq Epi: x / Non Sq Epi: Occasional / Bacteria: Occasional

## 2020-10-09 NOTE — PROGRESS NOTE ADULT - PROBLEM SELECTOR PLAN 3
on admission BUN/Cr: 16/1.00; BUN/Cr today 39/1.50 ; unclear etiology - possibly 2/2 to prerenal vs hypoxemic  - nephro (Dr. Mullen) consulted, recs appreciated - Check UA, Ur lytes, UPC, Ur osm, Corrected calcium 10.2, with paraprotein gap and anemia,  check FLC and SPEP, check Iron studies - f/u results  - bumetanide held 2/2 to ATI  - trend renal indices  - avoid nephrotoxic meds   - renally dose meds. on admission BUN/Cr: 16/1.00; BUN/Cr today 53/1.90 ; unclear etiology - possibly 2/2 to prerenal vs hypoxemic  - nephro (Dr. Mullen) consulted, recs appreciated - Check UA, Ur lytes, UPC, Ur osm, Corrected calcium 10.2, with paraprotein gap and anemia,  check FLC and SPEP, check Iron studies - f/u results  - bumetanide held 2/2 to ATI  - trend renal indices  - avoid nephrotoxic meds   - renally dose meds. on admission BUN/Cr: 16/1.00; BUN/Cr today 53/1.90 ; unclear etiology - possibly 2/2 to prerenal vs hypoxemic  - nephro (Dr. Mullen) consulted, recs appreciated - Check UA, Ur lytes, UPC, Ur osm, Corrected calcium 10.2, with paraprotein gap and anemia,  check FLC and SPEP  -Iron 38; unsat ; TIBC 236; % sat 16   - Renal US: no hydronephrosis   - bumetanide held 2/2 to ATI  - trend renal indices  - avoid nephrotoxic meds   - renally dose meds.

## 2020-10-09 NOTE — PROGRESS NOTE ADULT - ASSESSMENT
60F w/ end stage COPD on 3LNC (trying to get on  transplant list at [facility]), former smoker, CAD s/p stent (2016), afib on eliquis, uncontrolled DM2 (on insulin), raynaud phenomenon and recent hospitalization at [facility] for confusion and [facility] p/w sob, admitted for acute on chronic resp failure with hypoxia and hyercarbia sec to multifocal PNA and COPD exacerabtion with end stage COPD.

## 2020-10-09 NOTE — PROGRESS NOTE ADULT - SUBJECTIVE AND OBJECTIVE BOX
Name: CHERI HARTMAN  MRN: [id number]  LOCATION: [facility] [place] 3WES 362 W1    ----  Patient is a 60y old  Female who presents with a chief complaint of COPD exacerbation, PNA (09 Oct 2020 07:28)      FROM ADMISSION H+P:   HPI:  60F w/ end stage COPD on 3LNC (pending transplant list at [facility]), former smoker, CAD s/p stent (2016), afib on eliquis, uncontrolled DM2 (on insulin), raynaud phenomenon and recent hospitalization at [facility] for confusion and [facility] Patient states that she has had worsening shortness of breath for past two days. Unable to obtain comprehensive history due to dyspnea. Patient denies fever, chills, sore throat, cough, chest pain, palpitations, abd pain, n/v. Currently feels short of breath even after receiving duonebs and steroids in the ED. Unable to keep mask on during interview and lay back in stretcher due to subjective sob. Patient repeatedly stating that it is too hot in her room and fanning herself with a paper. Per chart, Dr. Mathew (PCP) has chart notes regarding possible hallucinations. Would like her home medications now but otherwise has no acute complaints.    In the ED, VS T97.7F  /78 RR 21 SpO2 94% on 4LNC. Labs significant for mild leukocytosis of 11.41, H/H 9.9/30.5, thrombophilia of 435. CO2 35, albumin 2.4.   CXR done showing b/l patchy infiltrates, official read pending  CT chest done showing multifocal PNA and reactive mediastinal lymphadenopathy  EKG read limited by artifact, sinus tachycardia with RBBB and premature supraventricular complexes (06 Oct 2020 04:55)      ----  INTERVAL HPI/OVERNIGHT EVENTS: Pt seen and evaluated at the bedside. No acute overnight events occurred.     ----  PAST MEDICAL & SURGICAL HISTORY:  H/O Raynaud&#x27;s syndrome    Ascorbic acid deficiency    Iron deficiency anemia    Constipation    GERD without esophagitis    Type 2 diabetes mellitus    HTN (hypertension)    Heart failure    HLD (hyperlipidemia)    History of chronic atrial fibrillation  on Eliquis    End stage COPD  on 3LNC    Atrial fibrillation    Hyperlipemia    Chronic sinusitis    Raynaud phenomenon    HTN (hypertension)    DM (diabetes mellitus)    Claustrophobia    COPD (chronic obstructive pulmonary disease)    History of tonsillectomy    S/P tonsillectomy        FAMILY HISTORY:  FH: myocardial infarction    Family history of CABG    FH: MI (myocardial infarction)    FH: CABG (coronary artery bypass surgery)        Allergies    No Known Allergies    Intolerances    albuterol (Unknown)      ----  ANTIMICROBIALS:  azithromycin  IVPB 500 milliGRAM(s) IV Intermittent every 24 hours  cefepime   IVPB 2000 milliGRAM(s) IV Intermittent every 12 hours    CARDIOVASCULAR:  diltiazem    milliGRAM(s) Oral daily    GASTROINTESTINAL:  bisacodyl Suppository 10 milliGRAM(s) Rectal daily PRN  lactulose Syrup 15 Gram(s) Oral two times a day PRN  pantoprazole    Tablet 40 milliGRAM(s) Oral before breakfast  polyethylene glycol 3350 17 Gram(s) Oral daily  senna 2 Tablet(s) Oral at bedtime    PULMONARY:  budesonide 160 MICROgram(s)/formoterol 4.5 MICROgram(s) Inhaler 2 Puff(s) Inhalation two times a day  guaiFENesin   Syrup  (Sugar-Free) 100 milliGRAM(s) Oral every 6 hours PRN  ipratropium    for Nebulization 500 MICROGram(s) Nebulizer every 6 hours  montelukast 10 milliGRAM(s) Oral at bedtime  tiotropium 18 MICROgram(s) Capsule 1 Capsule(s) Inhalation daily      ----  REVIEW OF SYSTEMS:  CONSTITUTIONAL: denies fever, chills, fatigue, weakness  HEENT: denies blurred vision, sore throat  CARDIOVASCULAR: denies chest pain, chest pressure, palpitations  RESPIRATORY: denies shortness of breath, sputum production  GASTROINTESTINAL: denies nausea, vomiting, diarrhea, abdominal pain  NEUROLOGICAL: denies numbness, headache, focal weakness  MUSCULOSKELETAL: denies new joint pain, muscle aches    ----  PHYSICAL EXAM:  GENERAL: patient appears well, no acute distress  ENMT: oropharynx clear without erythema, moist mucous membranes  LUNGS: good air entry bilaterally, clear to auscultation, symmetric breath sounds, no wheezing or rhonchi appreciated  HEART: soft S1/S2, regular rate and rhythm, no murmurs noted, no noted edema to b/l LE  GASTROINTESTINAL: abdomen is soft, nontender, nondistended, normoactive bowel sounds, no palpable masses  MUSCULOSKELETAL: no clubbing or cyanosis, no obvious deformity  NEUROLOGIC: awake, alert, readily interactive, good muscle tone in 4 extremities    T(C): 36.7 (10-09-20 @ 05:40), Max: 36.7 (10-09-20 @ 05:40)  HR: 94 (10-09-20 @ 06:08) (67 - 119)  BP: 137/85 (10-09-20 @ 05:40) (126/80 - 137/85)  RR: 18 (10-09-20 @ 05:40) (18 - 20)  SpO2: 100% (10-09-20 @ 06:08) (92% - 100%)  Wt(kg): --    ----  INTAKE & OUTPUT:  I&O's Summary    08 Oct 2020 07:01  -  09 Oct 2020 07:00  --------------------------------------------------------  IN: 300 mL / OUT: 0 mL / NET: 300 mL        LABS:                        9.6    12.81 )-----------( 504      ( 09 Oct 2020 07:32 )             31.9     10    137  |  97  |  53<H>  ----------------------------<  239<H>  5.0   |  37<H>  |  1.90<H>    Ca    9.2      09 Oct 2020 07:32  Phos  2.9     10-09  Mg     2.1     10-09    TPro  7.2  /  Alb  2.5<L>  /  TBili  0.2  /  DBili  x   /  AST  21  /  ALT  21  /  AlkPhos  81  10-09      Urinalysis Basic - ( 08 Oct 2020 14:18 )    Color: Yellow / Appearance: Clear / S.015 / pH: x  Gluc: x / Ketone: Trace  / Bili: Negative / Urobili: Negative   Blood: x / Protein: 30 mg/dL / Nitrite: Negative   Leuk Esterase: Negative / RBC: 0-2 /HPF / WBC 3-5   Sq Epi: x / Non Sq Epi: Occasional / Bacteria: Occasional      CAPILLARY BLOOD GLUCOSE      POCT Blood Glucose.: 196 mg/dL (08 Oct 2020 21:04)  POCT Blood Glucose.: 248 mg/dL (08 Oct 2020 17:03)  POCT Blood Glucose.: 225 mg/dL (08 Oct 2020 11:53)        Culture - Blood (collected 10-06-20 @ 09:23)  Source: .Blood Blood  Preliminary Report (10-07-20 @ 10:01):    No growth to date.    Culture - Blood (collected 10-06-20 @ 09:23)  Source: .Blood Blood  Preliminary Report (10-07-20 @ 10:01):    No growth to date.        ----  Personally reviewed:  Vital sign trends: [  ] yes    [  ] no     [  ] n/a  Laboratory results: [  ] yes    [  ] no     [  ] n/a  Radiology results: [  ] yes    [  ] no     [  ] n/a  Culture results: [  ] yes    [  ] no     [  ] n/a  Consultant recommendations: [  ] yes    [  ] no     [  ] n/a         Name: CHERI HARTMAN  MRN: [id number]  LOCATION: \[facility]\"" 3WES 362 W1    ----  Patient is a 60y old  Female who presents with a chief complaint of COPD exacerbation, PNA (09 Oct 2020 07:28)      FROM ADMISSION H+P:   HPI:  60F w/ end stage COPD on 3LNC (pending transplant list at [facility]), former smoker, CAD s/p stent (2016), afib on eliquis, uncontrolled DM2 (on insulin), raynaud phenomenon and recent hospitalization at [facility] for confusion and [facility] Patient states that she has had worsening shortness of breath for past two days. Unable to obtain comprehensive history due to dyspnea. Patient denies fever, chills, sore throat, cough, chest pain, palpitations, abd pain, n/v. Currently feels short of breath even after receiving duonebs and steroids in the ED. Unable to keep mask on during interview and lay back in stretcher due to subjective sob. Patient repeatedly stating that it is too hot in her room and fanning herself with a paper. Per chart, Dr. Mathew (PCP) has chart notes regarding possible hallucinations. Would like her home medications now but otherwise has no acute complaints.    In the ED, VS T97.7F  /78 RR 21 SpO2 94% on 4LNC. Labs significant for mild leukocytosis of 11.41, H/H 9.9/30.5, thrombophilia of 435. CO2 35, albumin 2.4.   CXR done showing b/l patchy infiltrates, official read pending  CT chest done showing multifocal PNA and reactive mediastinal lymphadenopathy  EKG read limited by artifact, sinus tachycardia with RBBB and premature supraventricular complexes (06 Oct 2020 04:55)      ----  INTERVAL HPI/OVERNIGHT EVENTS: Pt seen and evaluated at the bedside. Stated that last night she had an episode of SOB where her O2 sat dropped down to the 87%. She took off her BIPAP and could not find her NC and started to have a panic attack; during this episode, she urinated on herself. Pt also stated that she was having     ----  PAST MEDICAL & SURGICAL HISTORY:  H/O Raynaud&#x27;s syndrome    Ascorbic acid deficiency    Iron deficiency anemia    Constipation    GERD without esophagitis    Type 2 diabetes mellitus    HTN (hypertension)    Heart failure    HLD (hyperlipidemia)    History of chronic atrial fibrillation  on Eliquis    End stage COPD  on 3LNC    Atrial fibrillation    Hyperlipemia    Chronic sinusitis    Raynaud phenomenon    HTN (hypertension)    DM (diabetes mellitus)    Claustrophobia    COPD (chronic obstructive pulmonary disease)    History of tonsillectomy    S/P tonsillectomy        FAMILY HISTORY:  FH: myocardial infarction    Family history of CABG    FH: MI (myocardial infarction)    FH: CABG (coronary artery bypass surgery)        Allergies    No Known Allergies    Intolerances    albuterol (Unknown)      ----  ANTIMICROBIALS:  azithromycin  IVPB 500 milliGRAM(s) IV Intermittent every 24 hours  cefepime   IVPB 2000 milliGRAM(s) IV Intermittent every 12 hours    CARDIOVASCULAR:  diltiazem    milliGRAM(s) Oral daily    GASTROINTESTINAL:  bisacodyl Suppository 10 milliGRAM(s) Rectal daily PRN  lactulose Syrup 15 Gram(s) Oral two times a day PRN  pantoprazole    Tablet 40 milliGRAM(s) Oral before breakfast  polyethylene glycol 3350 17 Gram(s) Oral daily  senna 2 Tablet(s) Oral at bedtime    PULMONARY:  budesonide 160 MICROgram(s)/formoterol 4.5 MICROgram(s) Inhaler 2 Puff(s) Inhalation two times a day  guaiFENesin   Syrup  (Sugar-Free) 100 milliGRAM(s) Oral every 6 hours PRN  ipratropium    for Nebulization 500 MICROGram(s) Nebulizer every 6 hours  montelukast 10 milliGRAM(s) Oral at bedtime  tiotropium 18 MICROgram(s) Capsule 1 Capsule(s) Inhalation daily      ----  REVIEW OF SYSTEMS:  CONSTITUTIONAL: denies fever, chills, fatigue, weakness  HEENT: denies blurred vision, sore throat  CARDIOVASCULAR: denies chest pain, chest pressure, palpitations  RESPIRATORY: denies shortness of breath, sputum production  GASTROINTESTINAL: denies nausea, vomiting, diarrhea, abdominal pain  NEUROLOGICAL: denies numbness, headache, focal weakness  MUSCULOSKELETAL: denies new joint pain, muscle aches    ----  PHYSICAL EXAM:  GENERAL: patient appears well, no acute distress  ENMT: oropharynx clear without erythema, moist mucous membranes  LUNGS: good air entry bilaterally, clear to auscultation, symmetric breath sounds, no wheezing or rhonchi appreciated  HEART: soft S1/S2, regular rate and rhythm, no murmurs noted, no noted edema to b/l LE  GASTROINTESTINAL: abdomen is soft, nontender, nondistended, normoactive bowel sounds, no palpable masses  MUSCULOSKELETAL: no clubbing or cyanosis, no obvious deformity  NEUROLOGIC: awake, alert, readily interactive, good muscle tone in 4 extremities    T(C): 36.7 (10-09-20 @ 05:40), Max: 36.7 (10-09-20 @ 05:40)  HR: 94 (10-09-20 @ 06:08) (67 - 119)  BP: 137/85 (10-09-20 @ 05:40) (126/80 - 137/85)  RR: 18 (10-09-20 @ 05:40) (18 - 20)  SpO2: 100% (10-09-20 @ 06:08) (92% - 100%)  Wt(kg): --    ----  INTAKE & OUTPUT:  I&O's Summary    08 Oct 2020 07:01  -  09 Oct 2020 07:00  --------------------------------------------------------  IN: 300 mL / OUT: 0 mL / NET: 300 mL        LABS:                        9.6    12.81 )-----------( 504      ( 09 Oct 2020 07:32 )             31.9     10    137  |  97  |  53<H>  ----------------------------<  239<H>  5.0   |  37<H>  |  1.90<H>    Ca    9.2      09 Oct 2020 07:32  Phos  2.9     10-09  Mg     2.1     10-09    TPro  7.2  /  Alb  2.5<L>  /  TBili  0.2  /  DBili  x   /  AST  21  /  ALT  21  /  AlkPhos  81  1009      Urinalysis Basic - ( 08 Oct 2020 14:18 )    Color: Yellow / Appearance: Clear / S.015 / pH: x  Gluc: x / Ketone: Trace  / Bili: Negative / Urobili: Negative   Blood: x / Protein: 30 mg/dL / Nitrite: Negative   Leuk Esterase: Negative / RBC: 0-2 /HPF / WBC 3-5   Sq Epi: x / Non Sq Epi: Occasional / Bacteria: Occasional      CAPILLARY BLOOD GLUCOSE      POCT Blood Glucose.: 196 mg/dL (08 Oct 2020 21:04)  POCT Blood Glucose.: 248 mg/dL (08 Oct 2020 17:03)  POCT Blood Glucose.: 225 mg/dL (08 Oct 2020 11:53)        Culture - Blood (collected 10-06-20 @ 09:23)  Source: .Blood Blood  Preliminary Report (10-07-20 @ 10:01):    No growth to date.    Culture - Blood (collected 10-06-20 @ 09:23)  Source: .Blood Blood  Preliminary Report (10-07-20 @ 10:01):    No growth to date.        ----  Personally reviewed:  Vital sign trends: [  ] yes    [  ] no     [  ] n/a  Laboratory results: [  ] yes    [  ] no     [  ] n/a  Radiology results: [  ] yes    [  ] no     [  ] n/a  Culture results: [  ] yes    [  ] no     [  ] n/a  Consultant recommendations: [  ] yes    [  ] no     [  ] n/a         Name: CHERI HARTMAN  MRN: [id number]  LOCATION: [facility] 3WES 362 W1    ----  Patient is a 60y old  Female who presents with a chief complaint of COPD exacerbation, PNA (09 Oct 2020 07:28)      FROM ADMISSION H+P:   HPI:  60F w/ end stage COPD on 3LNC (pending transplant list at [facility]), former smoker, CAD s/p stent (2016), afib on eliquis, uncontrolled DM2 (on insulin), raynaud phenomenon and recent hospitalization at [facility] for confusion and [facility] p[facility] sob[facility] Patient states that she has had worsening shortness of breath for past two days. Unable to obtain comprehensive history due to dyspnea. Patient denies fever, chills, sore throat, cough, chest pain, palpitations, abd pain, n/v. Currently feels short of breath even after receiving duonebs and steroids in the ED. Unable to keep mask on during interview and lay back in stretcher due to subjective sob. Patient repeatedly stating that it is too hot in her room and fanning herself with a paper. Per chart, Dr. Mathew (PCP) has chart notes regarding possible hallucinations. Would like her home medications now but otherwise has no acute complaints.    In the ED, VS T97.7F  /78 RR 21 SpO2 94% on 4LNC. Labs significant for mild leukocytosis of 11.41, H/H 9.9/30.5, thrombophilia of 435. CO2 35, albumin 2.4.   CXR done showing b/l patchy infiltrates, official read pending  CT chest done showing multifocal PNA and reactive mediastinal lymphadenopathy  EKG read limited by artifact, sinus tachycardia with RBBB and premature supraventricular complexes (06 Oct 2020 04:55)      ----  INTERVAL HPI/OVERNIGHT EVENTS: Pt seen and evaluated at the bedside. Stated that last night she had an episode of SOB where her O2 sat dropped down to the 87%. She took off her BIPAP and could not find her NC and started to have a panic attack; during this episode, she urinated on herself. Pt also stated that she was having a reaction to the nebulizer treatment where she felt more SOB and some chest pain. Feels anxious, fatigued and SOB today. Denies fever, chills, chest pain, abdominal pain, n/v/d/c.    ----  PAST MEDICAL & SURGICAL HISTORY:  H/O Raynaud&#x27;s syndrome    Ascorbic acid deficiency    Iron deficiency anemia    Constipation    GERD without esophagitis    Type 2 diabetes mellitus    HTN (hypertension)    Heart failure    HLD (hyperlipidemia)    History of chronic atrial fibrillation  on Eliquis    End stage COPD  on 3LNC    Atrial fibrillation    Hyperlipemia    Chronic sinusitis    Raynaud phenomenon    HTN (hypertension)    DM (diabetes mellitus)    Claustrophobia    COPD (chronic obstructive pulmonary disease)    History of tonsillectomy    S/P tonsillectomy        FAMILY HISTORY:  FH: myocardial infarction    Family history of CABG    FH: MI (myocardial infarction)    FH: CABG (coronary artery bypass surgery)        Allergies    No Known Allergies    Intolerances    albuterol (Unknown)      ----  ANTIMICROBIALS:  azithromycin  IVPB 500 milliGRAM(s) IV Intermittent every 24 hours  cefepime   IVPB 2000 milliGRAM(s) IV Intermittent every 12 hours    CARDIOVASCULAR:  diltiazem    milliGRAM(s) Oral daily    GASTROINTESTINAL:  bisacodyl Suppository 10 milliGRAM(s) Rectal daily PRN  lactulose Syrup 15 Gram(s) Oral two times a day PRN  pantoprazole    Tablet 40 milliGRAM(s) Oral before breakfast  polyethylene glycol 3350 17 Gram(s) Oral daily  senna 2 Tablet(s) Oral at bedtime    PULMONARY:  budesonide 160 MICROgram(s)/formoterol 4.5 MICROgram(s) Inhaler 2 Puff(s) Inhalation two times a day  guaiFENesin   Syrup  (Sugar-Free) 100 milliGRAM(s) Oral every 6 hours PRN  ipratropium    for Nebulization 500 MICROGram(s) Nebulizer every 6 hours  montelukast 10 milliGRAM(s) Oral at bedtime  tiotropium 18 MICROgram(s) Capsule 1 Capsule(s) Inhalation daily      ----  REVIEW OF SYSTEMS:  CONSTITUTIONAL: + fatigue; denies fever, chills, weakness  HEENT: denies blurred vision, sore throat  CARDIOVASCULAR: denies chest pain, chest pressure, palpitations  RESPIRATORY: + shortness of breath; denies coughing and sputum production  GASTROINTESTINAL: denies nausea, vomiting, diarrhea, abdominal pain  NEUROLOGICAL: denies numbness, headache, focal weakness  MUSCULOSKELETAL: denies new joint pain, muscle aches    ----  PHYSICAL EXAM:  GENERAL: patient appears well, anxious   ENMT: oropharynx clear without erythema, EOMI b/l  LUNGS: decreased breath sounds b/l; no wheezing or rhonchi appreciated  HEART: soft S1/S2, regular rate and rhythm, no murmurs noted, no noted edema to b/l LE  GASTROINTESTINAL: abdomen is soft, nontender, nondistended, normoactive bowel sounds, no palpable masses  MUSCULOSKELETAL: no clubbing or cyanosis, no obvious deformity  NEUROLOGIC: awake, alert, readily interactive, good muscle tone in 4 extremities    T(C): 36.7 (10-09-20 @ 05:40), Max: 36.7 (10-09-20 @ 05:40)  HR: 94 (10-09-20 @ 06:08) (67 - 119)  BP: 137/85 (10-09-20 @ 05:40) (126/80 - 137/85)  RR: 18 (10-09-20 @ 05:40) (18 - 20)  SpO2: 100% (10-09-20 @ 06:08) (92% - 100%)  Wt(kg): --    ----  INTAKE & OUTPUT:  I&O's Summary    08 Oct 2020 07:01  -  09 Oct 2020 07:00  --------------------------------------------------------  IN: 300 mL / OUT: 0 mL / NET: 300 mL        LABS:                        9.6    12.81 )-----------( 504      ( 09 Oct 2020 07:32 )             31.9     10-09    137  |  97  |  53<H>  ----------------------------<  239<H>  5.0   |  37<H>  |  1.90<H>    Ca    9.2      09 Oct 2020 07:32  Phos  2.9     10-09  Mg     2.1     10-09    TPro  7.2  /  Alb  2.5<L>  /  TBili  0.2  /  DBili  x   /  AST  21  /  ALT  21  /  AlkPhos  81  10-09      Urinalysis Basic - ( 08 Oct 2020 14:18 )    Color: Yellow / Appearance: Clear / S.015 / pH: x  Gluc: x / Ketone: Trace  / Bili: Negative / Urobili: Negative   Blood: x / Protein: 30 mg/dL / Nitrite: Negative   Leuk Esterase: Negative / RBC: 0-2 /HPF / WBC 3-5   Sq Epi: x / Non Sq Epi: Occasional / Bacteria: Occasional      CAPILLARY BLOOD GLUCOSE      POCT Blood Glucose.: 196 mg/dL (08 Oct 2020 21:04)  POCT Blood Glucose.: 248 mg/dL (08 Oct 2020 17:03)  POCT Blood Glucose.: 225 mg/dL (08 Oct 2020 11:53)        Culture - Blood (collected 10-06-20 @ 09:23)  Source: .Blood Blood  Preliminary Report (10-07-20 @ 10:01):    No growth to date.    Culture - Blood (collected 10-06-20 @ 09:23)  Source: .Blood Blood  Preliminary Report (10-07-20 @ 10:01):    No growth to date.        ----  Personally reviewed:  Vital sign trends: [  ] yes    [  ] no     [  ] n/a  Laboratory results: [  ] yes    [  ] no     [  ] n/a  Radiology results: [  ] yes    [  ] no     [  ] n/a  Culture results: [  ] yes    [  ] no     [  ] n/a  Consultant recommendations: [  ] yes    [  ] no     [  ] n/a         Name: CHERI HARTMAN  MRN: [id number]  LOCATION: [facility] 3WES 362 W1    ----  Patient is a 60y old  Female who presents with a chief complaint of COPD exacerbation, PNA (09 Oct 2020 07:28)      FROM ADMISSION H+P:   HPI:  60F w/ end stage COPD on 3LNC (pending transplant list at [facility]), former smoker, CAD s/p stent (2016), afib on eliquis, uncontrolled DM2 (on insulin), raynaud phenomenon and recent hospitalization at [facility] for confusion and [facility] Patient states that she has had worsening shortness of breath for past two days. Unable to obtain comprehensive history due to dyspnea. Patient denies fever, chills, sore throat, cough, chest pain, palpitations, abd pain, n/v. Currently feels short of breath even after receiving duonebs and steroids in the ED. Unable to keep mask on during interview and lay back in stretcher due to subjective sob. Patient repeatedly stating that it is too hot in her room and fanning herself with a paper. Per chart, Dr. Mathew (PCP) has chart notes regarding possible hallucinations. Would like her home medications now but otherwise has no acute complaints.    In the ED, VS T97.7F  /78 RR 21 SpO2 94% on 4LNC. Labs significant for mild leukocytosis of 11.41, H/H 9.9/30.5, thrombophilia of 435. CO2 35, albumin 2.4.   CXR done showing b/l patchy infiltrates, official read pending  CT chest done showing multifocal PNA and reactive mediastinal lymphadenopathy  EKG read limited by artifact, sinus tachycardia with RBBB and premature supraventricular complexes (06 Oct 2020 04:55)      ----  INTERVAL HPI/OVERNIGHT EVENTS: Pt seen and evaluated at the bedside. Stated that last night she had an episode of SOB where her O2 sat dropped down to the 87%. At that time took off her BIPAP to switch to NC and could not find her NC and started to have a panic attack; during this episode, she urinated on herself. Pt also stated that she was having a reaction to the nebulizer treatment where she felt more SOB and some chest pain. Feels anxious, fatigued and SOB today. Denies fever, chills, chest pain, abdominal pain, n/v/d/c.    ----  PAST MEDICAL & SURGICAL HISTORY:  H/O Raynaud&#x27;s syndrome    Ascorbic acid deficiency    Iron deficiency anemia    Constipation    GERD without esophagitis    Type 2 diabetes mellitus    HTN (hypertension)    Heart failure    HLD (hyperlipidemia)    History of chronic atrial fibrillation  on Eliquis    End stage COPD  on 3LNC    Atrial fibrillation    Hyperlipemia    Chronic sinusitis    Raynaud phenomenon    HTN (hypertension)    DM (diabetes mellitus)    Claustrophobia    COPD (chronic obstructive pulmonary disease)    History of tonsillectomy    S/P tonsillectomy        FAMILY HISTORY:  FH: myocardial infarction    Family history of CABG    FH: MI (myocardial infarction)    FH: CABG (coronary artery bypass surgery)        Allergies    No Known Allergies    Intolerances    albuterol (Unknown)      ----  ANTIMICROBIALS:  azithromycin  IVPB 500 milliGRAM(s) IV Intermittent every 24 hours  cefepime   IVPB 2000 milliGRAM(s) IV Intermittent every 12 hours    CARDIOVASCULAR:  diltiazem    milliGRAM(s) Oral daily    GASTROINTESTINAL:  bisacodyl Suppository 10 milliGRAM(s) Rectal daily PRN  lactulose Syrup 15 Gram(s) Oral two times a day PRN  pantoprazole    Tablet 40 milliGRAM(s) Oral before breakfast  polyethylene glycol 3350 17 Gram(s) Oral daily  senna 2 Tablet(s) Oral at bedtime    PULMONARY:  budesonide 160 MICROgram(s)/formoterol 4.5 MICROgram(s) Inhaler 2 Puff(s) Inhalation two times a day  guaiFENesin   Syrup  (Sugar-Free) 100 milliGRAM(s) Oral every 6 hours PRN  ipratropium    for Nebulization 500 MICROGram(s) Nebulizer every 6 hours  montelukast 10 milliGRAM(s) Oral at bedtime  tiotropium 18 MICROgram(s) Capsule 1 Capsule(s) Inhalation daily      ----  REVIEW OF SYSTEMS:  CONSTITUTIONAL: + fatigue; denies fever, chills, weakness  HEENT: denies blurred vision, sore throat  CARDIOVASCULAR: denies chest pain, chest pressure, palpitations  RESPIRATORY: + shortness of breath; denies coughing and sputum production  GASTROINTESTINAL: denies nausea, vomiting, diarrhea, abdominal pain  NEUROLOGICAL: denies numbness, headache, focal weakness  MUSCULOSKELETAL: denies new joint pain, muscle aches    ----  PHYSICAL EXAM:  GENERAL: patient appears well, anxious   ENMT: oropharynx clear without erythema, EOMI b/l  LUNGS: decreased breath sounds b/l; no wheezing or rhonchi appreciated  HEART: soft S1/S2, regular rate and rhythm, no murmurs noted, no noted edema to b/l LE  GASTROINTESTINAL: abdomen is soft, nontender, nondistended, normoactive bowel sounds, no palpable masses  MUSCULOSKELETAL: no clubbing or cyanosis, no obvious deformity  NEUROLOGIC: awake, alert, readily interactive, good muscle tone in 4 extremities    T(C): 36.7 (10-09-20 @ 05:40), Max: 36.7 (10-09-20 @ 05:40)  HR: 94 (10-09-20 @ 06:08) (67 - 119)  BP: 137/85 (10-09-20 @ 05:40) (126/80 - 137/85)  RR: 18 (10-09-20 @ 05:40) (18 - 20)  SpO2: 100% (10-09-20 @ 06:08) (92% - 100%)  Wt(kg): --    ----  INTAKE & OUTPUT:  I&O's Summary    08 Oct 2020 07:01  -  09 Oct 2020 07:00  --------------------------------------------------------  IN: 300 mL / OUT: 0 mL / NET: 300 mL        LABS:                        9.6    12.81 )-----------( 504      ( 09 Oct 2020 07:32 )             31.9     10-09    137  |  97  |  53<H>  ----------------------------<  239<H>  5.0   |  37<H>  |  1.90<H>    Ca    9.2      09 Oct 2020 07:32  Phos  2.9     10-09  Mg     2.1     10-09    TPro  7.2  /  Alb  2.5<L>  /  TBili  0.2  /  DBili  x   /  AST  21  /  ALT  21  /  AlkPhos  81  10      Urinalysis Basic - ( 08 Oct 2020 14:18 )    Color: Yellow / Appearance: Clear / S.015 / pH: x  Gluc: x / Ketone: Trace  / Bili: Negative / Urobili: Negative   Blood: x / Protein: 30 mg/dL / Nitrite: Negative   Leuk Esterase: Negative / RBC: 0-2 /HPF / WBC 3-5   Sq Epi: x / Non Sq Epi: Occasional / Bacteria: Occasional      CAPILLARY BLOOD GLUCOSE      POCT Blood Glucose.: 196 mg/dL (08 Oct 2020 21:04)  POCT Blood Glucose.: 248 mg/dL (08 Oct 2020 17:03)  POCT Blood Glucose.: 225 mg/dL (08 Oct 2020 11:53)        Culture - Blood (collected 10-06-20 @ 09:23)  Source: .Blood Blood  Preliminary Report (10-07-20 @ 10:01):    No growth to date.    Culture - Blood (collected 10-06-20 @ 09:23)  Source: .Blood Blood  Preliminary Report (10-07-20 @ 10:01):    No growth to date.        ----  Personally reviewed:  Vital sign trends: [  ] yes    [  ] no     [  ] n/a  Laboratory results: [  ] yes    [  ] no     [  ] n/a  Radiology results: [  ] yes    [  ] no     [  ] n/a  Culture results: [  ] yes    [  ] no     [  ] n/a  Consultant recommendations: [  ] yes    [  ] no     [  ] n/a         Name: CHERI HARTMAN  MRN: [id number]  LOCATION: [facility] 3WES 362 W1    ----  Patient is a 60y old  Female who presents with a chief complaint of COPD exacerbation, PNA (09 Oct 2020 07:28)      FROM ADMISSION H+P:   HPI:  60F w/ end stage COPD on 3LNC (pending transplant list at [facility]), former smoker, CAD s/p stent (2016), afib on eliquis, uncontrolled DM2 (on insulin), raynaud phenomenon and recent hospitalization at [facility] for confusion and [facility] Patient states that she has had worsening shortness of breath for past two days. Unable to obtain comprehensive history due to dyspnea. Patient denies fever, chills, sore throat, cough, chest pain, palpitations, abd pain, n/v. Currently feels short of breath even after receiving duonebs and steroids in the ED. Unable to keep mask on during interview and lay back in stretcher due to subjective sob. Patient repeatedly stating that it is too hot in her room and fanning herself with a paper. Per chart, Dr. Mathew (PCP) has chart notes regarding possible hallucinations. Would like her home medications now but otherwise has no acute complaints.    In the ED, VS T97.7F  /78 RR 21 SpO2 94% on 4LNC. Labs significant for mild leukocytosis of 11.41, H/H 9.9/30.5, thrombophilia of 435. CO2 35, albumin 2.4.   CXR done showing b/l patchy infiltrates, official read pending  CT chest done showing multifocal PNA and reactive mediastinal lymphadenopathy  EKG read limited by artifact, sinus tachycardia with RBBB and premature supraventricular complexes (06 Oct 2020 04:55)      ----  INTERVAL HPI/OVERNIGHT EVENTS: Pt seen and evaluated at the bedside. Stated that last night she had an episode of SOB where her O2 sat dropped down to the 87%. At that time took off her BIPAP to switch to NC and could not find her NC and started to have a panic attack; during this episode, she urinated on herself. Pt also stated that she was having a reaction to the nebulizer treatment where she felt more SOB and some chest pain. Feels anxious, fatigued and SOB today. Denies fever, chills, chest pain, abdominal pain, n/v/d/c.    ----  PAST MEDICAL & SURGICAL HISTORY:  H/O Raynaud&#x27;s syndrome    Ascorbic acid deficiency    Iron deficiency anemia    Constipation    GERD without esophagitis    Type 2 diabetes mellitus    HTN (hypertension)    Heart failure    HLD (hyperlipidemia)    History of chronic atrial fibrillation  on Eliquis    End stage COPD  on 3LNC    Atrial fibrillation    Hyperlipemia    Chronic sinusitis    Raynaud phenomenon    HTN (hypertension)    DM (diabetes mellitus)    Claustrophobia    COPD (chronic obstructive pulmonary disease)    History of tonsillectomy    S/P tonsillectomy        FAMILY HISTORY:  FH: myocardial infarction    Family history of CABG    FH: MI (myocardial infarction)    FH: CABG (coronary artery bypass surgery)        Allergies    No Known Allergies    Intolerances    albuterol (Unknown)      ----  ANTIMICROBIALS:  azithromycin  IVPB 500 milliGRAM(s) IV Intermittent every 24 hours  cefepime   IVPB 2000 milliGRAM(s) IV Intermittent every 12 hours    CARDIOVASCULAR:  diltiazem    milliGRAM(s) Oral daily    GASTROINTESTINAL:  bisacodyl Suppository 10 milliGRAM(s) Rectal daily PRN  lactulose Syrup 15 Gram(s) Oral two times a day PRN  pantoprazole    Tablet 40 milliGRAM(s) Oral before breakfast  polyethylene glycol 3350 17 Gram(s) Oral daily  senna 2 Tablet(s) Oral at bedtime    PULMONARY:  budesonide 160 MICROgram(s)/formoterol 4.5 MICROgram(s) Inhaler 2 Puff(s) Inhalation two times a day  guaiFENesin   Syrup  (Sugar-Free) 100 milliGRAM(s) Oral every 6 hours PRN  ipratropium    for Nebulization 500 MICROGram(s) Nebulizer every 6 hours  montelukast 10 milliGRAM(s) Oral at bedtime  tiotropium 18 MICROgram(s) Capsule 1 Capsule(s) Inhalation daily      ----  REVIEW OF SYSTEMS:  CONSTITUTIONAL: + fatigue; denies fever, chills, weakness  HEENT: denies blurred vision, sore throat  CARDIOVASCULAR: denies chest pain, chest pressure, palpitations  RESPIRATORY: + shortness of breath; denies coughing and sputum production  GASTROINTESTINAL: denies nausea, vomiting, diarrhea, abdominal pain  NEUROLOGICAL: denies numbness, headache, focal weakness  MUSCULOSKELETAL: denies new joint pain, muscle aches    ----  PHYSICAL EXAM:  GENERAL: patient appears well, anxious   ENMT: oropharynx clear without erythema, EOMI b/l  LUNGS: decreased breath sounds b/l; no wheezing or rhonchi appreciated  HEART: soft S1/S2, regular rate and rhythm, no murmurs noted, no noted edema to b/l LE  GASTROINTESTINAL: abdomen is soft, nontender, nondistended, normoactive bowel sounds, no palpable masses  MUSCULOSKELETAL: no clubbing or cyanosis, no obvious deformity  NEUROLOGIC: awake, alert, readily interactive, good muscle tone in 4 extremities    T(C): 36.7 (10-09-20 @ 05:40), Max: 36.7 (10-09-20 @ 05:40)  HR: 94 (10-09-20 @ 06:08) (67 - 119)  BP: 137/85 (10-09-20 @ 05:40) (126/80 - 137/85)  RR: 18 (10-09-20 @ 05:40) (18 - 20)  SpO2: 100% (10-09-20 @ 06:08) (92% - 100%)  Wt(kg): --    ----  INTAKE & OUTPUT:  I&O's Summary    08 Oct 2020 07:01  -  09 Oct 2020 07:00  --------------------------------------------------------  IN: 300 mL / OUT: 0 mL / NET: 300 mL        LABS:                        9.6    12.81 )-----------( 504      ( 09 Oct 2020 07:32 )             31.9     10-09    137  |  97  |  53<H>  ----------------------------<  239<H>  5.0   |  37<H>  |  1.90<H>    Ca    9.2      09 Oct 2020 07:32  Phos  2.9     10-09  Mg     2.1     10-09    TPro  7.2  /  Alb  2.5<L>  /  TBili  0.2  /  DBili  x   /  AST  21  /  ALT  21  /  AlkPhos  81  10      Urinalysis Basic - ( 08 Oct 2020 14:18 )    Color: Yellow / Appearance: Clear / S.015 / pH: x  Gluc: x / Ketone: Trace  / Bili: Negative / Urobili: Negative   Blood: x / Protein: 30 mg/dL / Nitrite: Negative   Leuk Esterase: Negative / RBC: 0-2 /HPF / WBC 3-5   Sq Epi: x / Non Sq Epi: Occasional / Bacteria: Occasional      CAPILLARY BLOOD GLUCOSE      POCT Blood Glucose.: 196 mg/dL (08 Oct 2020 21:04)  POCT Blood Glucose.: 248 mg/dL (08 Oct 2020 17:03)  POCT Blood Glucose.: 225 mg/dL (08 Oct 2020 11:53)        Culture - Blood (collected 10-06-20 @ 09:23)  Source: .Blood Blood  Preliminary Report (10-07-20 @ 10:01):    No growth to date.    Culture - Blood (collected 10-06-20 @ 09:23)  Source: .Blood Blood  Preliminary Report (10-07-20 @ 10:01):    No growth to date.        ----  Personally reviewed:  Vital sign trends: [x  ] yes    [  ] no     [  ] n/a  Laboratory results: [ x ] yes    [  ] no     [  ] n/a  Radiology results: [ x ] yes    [  ] no     [  ] n/a  Culture results: [ x ] yes    [  ] no     [  ] n/a  Consultant recommendations: [x  ] yes    [  ] no     [  ] n/a

## 2020-10-09 NOTE — PROGRESS NOTE ADULT - PROBLEM SELECTOR PLAN 4
on metformin and glargine 12 U qhs, hold metformin while in hospital  - glargine 10units qhs - tirated up from 8units in setting of steroids, hyperglycemia  - start humalog 3 units TID premeal   - moderate dose ISS  - accuchecks, hypoglycemia protocol  - Hba1c: 6.0. on metformin and glargine 12 U qhs, hold metformin while in hospital  - glargine 10units qhs - tirated up from 8units in setting of steroids, hyperglycemia  - continue humalog 3 units TID premeal   - moderate dose ISS  - accuchecks, hypoglycemia protocol  - Hba1c: 6.0.

## 2020-10-09 NOTE — PROGRESS NOTE ADULT - ASSESSMENT
Pt. with endstage COPD on transplant list, with acute pneumonia.  Was more sob overnite, but cxr improved.  Continue antibiotics  Trial atrovent hhn  Inhalers.  PT.

## 2020-10-10 LAB
ALBUMIN SERPL ELPH-MCNC: 2.5 G/DL — LOW (ref 3.3–5)
ALP SERPL-CCNC: 76 U/L — SIGNIFICANT CHANGE UP (ref 40–120)
ALT FLD-CCNC: 19 U/L — SIGNIFICANT CHANGE UP (ref 12–78)
ANION GAP SERPL CALC-SCNC: 3 MMOL/L — LOW (ref 5–17)
AST SERPL-CCNC: 17 U/L — SIGNIFICANT CHANGE UP (ref 15–37)
BASOPHILS # BLD AUTO: 0.03 K/UL — SIGNIFICANT CHANGE UP (ref 0–0.2)
BASOPHILS NFR BLD AUTO: 0.3 % — SIGNIFICANT CHANGE UP (ref 0–2)
BILIRUB SERPL-MCNC: 0.2 MG/DL — SIGNIFICANT CHANGE UP (ref 0.2–1.2)
BUN SERPL-MCNC: 43 MG/DL — HIGH (ref 7–23)
CALCIUM SERPL-MCNC: 9.4 MG/DL — SIGNIFICANT CHANGE UP (ref 8.5–10.1)
CHLORIDE SERPL-SCNC: 97 MMOL/L — SIGNIFICANT CHANGE UP (ref 96–108)
CO2 SERPL-SCNC: 38 MMOL/L — HIGH (ref 22–31)
CREAT SERPL-MCNC: 1.3 MG/DL — SIGNIFICANT CHANGE UP (ref 0.5–1.3)
EOSINOPHIL # BLD AUTO: 0.2 K/UL — SIGNIFICANT CHANGE UP (ref 0–0.5)
EOSINOPHIL NFR BLD AUTO: 1.7 % — SIGNIFICANT CHANGE UP (ref 0–6)
GLUCOSE SERPL-MCNC: 203 MG/DL — HIGH (ref 70–99)
HCT VFR BLD CALC: 32.1 % — LOW (ref 34.5–45)
HGB BLD-MCNC: 9.5 G/DL — LOW (ref 11.5–15.5)
IMM GRANULOCYTES NFR BLD AUTO: 4 % — HIGH (ref 0–1.5)
LYMPHOCYTES # BLD AUTO: 1.38 K/UL — SIGNIFICANT CHANGE UP (ref 1–3.3)
LYMPHOCYTES # BLD AUTO: 11.7 % — LOW (ref 13–44)
MAGNESIUM SERPL-MCNC: 2.4 MG/DL — SIGNIFICANT CHANGE UP (ref 1.6–2.6)
MCHC RBC-ENTMCNC: 23.9 PG — LOW (ref 27–34)
MCHC RBC-ENTMCNC: 29.6 GM/DL — LOW (ref 32–36)
MCV RBC AUTO: 80.7 FL — SIGNIFICANT CHANGE UP (ref 80–100)
MONOCYTES # BLD AUTO: 1.07 K/UL — HIGH (ref 0–0.9)
MONOCYTES NFR BLD AUTO: 9 % — SIGNIFICANT CHANGE UP (ref 2–14)
NEUTROPHILS # BLD AUTO: 8.69 K/UL — HIGH (ref 1.8–7.4)
NEUTROPHILS NFR BLD AUTO: 73.3 % — SIGNIFICANT CHANGE UP (ref 43–77)
NRBC # BLD: 0 /100 WBCS — SIGNIFICANT CHANGE UP (ref 0–0)
PHOSPHATE SERPL-MCNC: 2.9 MG/DL — SIGNIFICANT CHANGE UP (ref 2.5–4.5)
PLATELET # BLD AUTO: 465 K/UL — HIGH (ref 150–400)
POTASSIUM SERPL-MCNC: 4.8 MMOL/L — SIGNIFICANT CHANGE UP (ref 3.5–5.3)
POTASSIUM SERPL-SCNC: 4.8 MMOL/L — SIGNIFICANT CHANGE UP (ref 3.5–5.3)
PROT SERPL-MCNC: 6.8 G/DL — SIGNIFICANT CHANGE UP (ref 6–8.3)
RBC # BLD: 3.98 M/UL — SIGNIFICANT CHANGE UP (ref 3.8–5.2)
RBC # FLD: 16 % — HIGH (ref 10.3–14.5)
SODIUM SERPL-SCNC: 138 MMOL/L — SIGNIFICANT CHANGE UP (ref 135–145)
VANCOMYCIN FLD-MCNC: <4 UG/ML — SIGNIFICANT CHANGE UP
WBC # BLD: 11.84 K/UL — HIGH (ref 3.8–10.5)
WBC # FLD AUTO: 11.84 K/UL — HIGH (ref 3.8–10.5)

## 2020-10-10 PROCEDURE — 99232 SBSQ HOSP IP/OBS MODERATE 35: CPT

## 2020-10-10 PROCEDURE — 99233 SBSQ HOSP IP/OBS HIGH 50: CPT | Mod: GC

## 2020-10-10 PROCEDURE — 71045 X-RAY EXAM CHEST 1 VIEW: CPT | Mod: 26

## 2020-10-10 RX ORDER — INSULIN LISPRO 100/ML
VIAL (ML) SUBCUTANEOUS AT BEDTIME
Refills: 0 | Status: DISCONTINUED | OUTPATIENT
Start: 2020-10-10 | End: 2020-10-20

## 2020-10-10 RX ADMIN — APIXABAN 5 MILLIGRAM(S): 2.5 TABLET, FILM COATED ORAL at 06:18

## 2020-10-10 RX ADMIN — Medication 500 MICROGRAM(S): at 14:20

## 2020-10-10 RX ADMIN — MONTELUKAST 10 MILLIGRAM(S): 4 TABLET, CHEWABLE ORAL at 21:31

## 2020-10-10 RX ADMIN — Medication 4: at 12:42

## 2020-10-10 RX ADMIN — AZITHROMYCIN 250 MILLIGRAM(S): 500 TABLET, FILM COATED ORAL at 11:31

## 2020-10-10 RX ADMIN — BUDESONIDE AND FORMOTEROL FUMARATE DIHYDRATE 2 PUFF(S): 160; 4.5 AEROSOL RESPIRATORY (INHALATION) at 06:18

## 2020-10-10 RX ADMIN — Medication 3 UNIT(S): at 12:42

## 2020-10-10 RX ADMIN — Medication 500 MICROGRAM(S): at 19:44

## 2020-10-10 RX ADMIN — PANTOPRAZOLE SODIUM 40 MILLIGRAM(S): 20 TABLET, DELAYED RELEASE ORAL at 06:18

## 2020-10-10 RX ADMIN — APIXABAN 5 MILLIGRAM(S): 2.5 TABLET, FILM COATED ORAL at 17:38

## 2020-10-10 RX ADMIN — Medication 600 MILLIGRAM(S): at 17:38

## 2020-10-10 RX ADMIN — Medication 3 UNIT(S): at 08:16

## 2020-10-10 RX ADMIN — ATORVASTATIN CALCIUM 80 MILLIGRAM(S): 80 TABLET, FILM COATED ORAL at 21:31

## 2020-10-10 RX ADMIN — SENNA PLUS 2 TABLET(S): 8.6 TABLET ORAL at 21:31

## 2020-10-10 RX ADMIN — Medication 40 MILLIGRAM(S): at 06:18

## 2020-10-10 RX ADMIN — INSULIN GLARGINE 10 UNIT(S): 100 INJECTION, SOLUTION SUBCUTANEOUS at 21:32

## 2020-10-10 RX ADMIN — Medication 240 MILLIGRAM(S): at 06:19

## 2020-10-10 RX ADMIN — Medication 1 TABLET(S): at 11:31

## 2020-10-10 RX ADMIN — Medication 1000 UNIT(S): at 11:31

## 2020-10-10 RX ADMIN — Medication 2: at 08:15

## 2020-10-10 RX ADMIN — Medication 325 MILLIGRAM(S): at 11:31

## 2020-10-10 RX ADMIN — CEFTRIAXONE 100 MILLIGRAM(S): 500 INJECTION, POWDER, FOR SOLUTION INTRAMUSCULAR; INTRAVENOUS at 16:57

## 2020-10-10 RX ADMIN — Medication 3 UNIT(S): at 17:38

## 2020-10-10 RX ADMIN — Medication 100 MILLIGRAM(S): at 09:22

## 2020-10-10 RX ADMIN — POLYETHYLENE GLYCOL 3350 17 GRAM(S): 17 POWDER, FOR SOLUTION ORAL at 11:31

## 2020-10-10 RX ADMIN — Medication 81 MILLIGRAM(S): at 11:31

## 2020-10-10 RX ADMIN — Medication 500 MICROGRAM(S): at 08:13

## 2020-10-10 RX ADMIN — Medication 3: at 21:32

## 2020-10-10 RX ADMIN — Medication 50 MILLILITER(S): at 08:14

## 2020-10-10 NOTE — PROGRESS NOTE ADULT - ASSESSMENT
Pt. with endstage COPD on transplant list, with acute pneumonia.  Will add mucinex.  FU CXR.   Continue antibiotics  Trial atrovent hhn which helped.   Inhalers.  PT.

## 2020-10-10 NOTE — PROGRESS NOTE ADULT - SUBJECTIVE AND OBJECTIVE BOX
Patient is a 60y old  Female who presents with a chief complaint of COPD exacerbation, PNA (08 Oct 2020 11:37)       HPI:  60F w/ end stage COPD on 3LNC (pending transplant list at [facility]), former smoker, CAD s/p stent (2016), afib on eliquis, uncontrolled DM2 (on insulin), raynaud phenomenon and recent hospitalization at [facility] for confusion and [facility]A p/w sob. [facility] Patient states that she has had worsening shortness of breath for past two days. Unable to obtain comprehensive history due to dyspnea. Patient denies fever, chills, sore throat, cough, chest pain, palpitations, abd pain, n/v. Currently feels short of breath even after receiving duonebs and steroids in the ED. Unable to keep mask on during interview and lay back in stretcher due to subjective sob. Patient repeatedly stating that it is too hot in her room and fanning herself with a paper. Per chart, Dr. Mathew (PCP) has chart notes regarding possible hallucinations. Would like her home medications now but otherwise has no acute complaints.  Has not seen nephrologist.  Denies any N/V.  SOB improving.  States she is urinating well.  Denies any urinary retention.  Denies any supplement or NSAID usage.         Emotional, but no new complaints.  Still urinating well.     PAST MEDICAL & SURGICAL HISTORY:  H/O Raynaud&#x27;s syndrome    Ascorbic acid deficiency    Iron deficiency anemia    Constipation    GERD without esophagitis    Type 2 diabetes mellitus    HTN (hypertension)    Heart failure    HLD (hyperlipidemia)    History of chronic atrial fibrillation  on Eliquis    End stage COPD  on 3LNC    Atrial fibrillation    Hyperlipemia    Chronic sinusitis    Raynaud phenomenon    HTN (hypertension)    DM (diabetes mellitus)    Claustrophobia    COPD (chronic obstructive pulmonary disease)    History of tonsillectomy    S/P tonsillectomy         FAMILY HISTORY:  FH: myocardial infarction    Family history of CABG    FH: MI (myocardial infarction)    FH: CABG (coronary artery bypass surgery)    NC    Social History:Non smoker    MEDICATIONS  (STANDING):  apixaban 5 milliGRAM(s) Oral every 12 hours  ascorbic acid 500 milliGRAM(s) Oral daily  aspirin  chewable 81 milliGRAM(s) Oral daily  atorvastatin 80 milliGRAM(s) Oral at bedtime  azithromycin  IVPB 500 milliGRAM(s) IV Intermittent every 24 hours  budesonide 160 MICROgram(s)/formoterol 4.5 MICROgram(s) Inhaler 2 Puff(s) Inhalation two times a day  cefepime   IVPB 2000 milliGRAM(s) IV Intermittent every 12 hours  cholecalciferol 1000 Unit(s) Oral daily  dextrose 5%. 1000 milliLiter(s) (50 mL/Hr) IV Continuous <Continuous>  dextrose 50% Injectable 12.5 Gram(s) IV Push once  dextrose 50% Injectable 25 Gram(s) IV Push once  dextrose 50% Injectable 25 Gram(s) IV Push once  ferrous    sulfate 325 milliGRAM(s) Oral daily  insulin glargine Injectable (LANTUS) 10 Unit(s) SubCutaneous at bedtime  insulin lispro (HumaLOG) corrective regimen sliding scale   SubCutaneous three times a day before meals  insulin lispro Injectable (HumaLOG) 3 Unit(s) SubCutaneous three times a day before meals  montelukast 10 milliGRAM(s) Oral at bedtime  multivitamin 1 Tablet(s) Oral daily  pantoprazole    Tablet 40 milliGRAM(s) Oral before breakfast  polyethylene glycol 3350 17 Gram(s) Oral daily  potassium chloride    Tablet ER 40 milliEquivalent(s) Oral every 4 hours  senna 2 Tablet(s) Oral at bedtime  tiotropium 18 MICROgram(s) Capsule 1 Capsule(s) Inhalation daily    MEDICATIONS  (PRN):  acetaminophen   Tablet .. 650 milliGRAM(s) Oral every 6 hours PRN Mild Pain (1 - 3)  bisacodyl Suppository 10 milliGRAM(s) Rectal daily PRN Constipation  dextrose 40% Gel 15 Gram(s) Oral once PRN Blood Glucose LESS THAN 70 milliGRAM(s)/deciliter  glucagon  Injectable 1 milliGRAM(s) IntraMuscular once PRN Glucose LESS THAN 70 milligrams/deciliter  guaiFENesin   Syrup  (Sugar-Free) 100 milliGRAM(s) Oral every 6 hours PRN Cough  lactulose Syrup 15 Gram(s) Oral two times a day PRN constipation  levalbuterol Inhalation 0.63 milliGRAM(s) Inhalation every 4 hours PRN shortness of breath and/or wheezing  oxyCODONE    IR 5 milliGRAM(s) Oral every 6 hours PRN Moderate Pain (4 - 6)   Meds reviewed    Allergies    No Known Allergies    Intolerances    albuterol (Unknown)       REVIEW OF SYSTEMS:    CONSTITUTIONAL:  No weight loss   EYES: No eye pain, visual disturbances, or discharge  ENMT:  No difficulty hearing, tinnitus, vertigo; No sinus or throat pain  NECK: No pain or stiffness  BREASTS: No pain, masses, or nipple discharge  RESPIRATORY: +SOB. No wheeze. No NIELOSN  CARDIOVASCULAR: No chest pain, palpitations, dizziness,   GASTROINTESTINAL: No abdominal or epigastric pain. No nausea, vomiting, or hematemesis;  GENITOURINARY: No dysuria, frequency, hematuria, or incontinence  NEUROLOGICAL: No headaches, memory loss, loss of strength, numbness, or tremors  SKIN: no Diffuse erythema, no blisters  LYMPH NODES: No enlarged glands  ENDOCRINE: No heat or cold intolerance; No hair loss  MUSCULOSKELETAL: No joint pain or swelling   PSYCHIATRIC: No depression, anxiety, mood swings, or difficulty sleeping  ALLERGY AND IMMUNOLOGIC: No hives or eczema      ICU Vital Signs Last 24 Hrs  T(C): 36.8 (09 Oct 2020 13:34), Max: 36.8 (09 Oct 2020 13:34)  T(F): 98.3 (09 Oct 2020 13:34), Max: 98.3 (09 Oct 2020 13:34)  HR: 93 (09 Oct 2020 13:49) (67 - 104)  BP: 139/75 (09 Oct 2020 13:34) (132/76 - 139/75)  BP(mean): --  ABP: --  ABP(mean): --  RR: 18 (09 Oct 2020 13:34) (18 - 20)  SpO2: 93% (09 Oct 2020 13:49) (93% - 100%)        PHYSICAL EXAM:    GENERAL: No distress  HEAD:  Atraumatic, Normocephalic  EYES: EOMI, conjunctiva and sclera clear  ENMT: No drainage from nares, no drainage from pinna  NECK: Supple, neck  veins full  NERVOUS SYSTEM:  Alert & Oriented X3, Good concentration; Motor Strength wnl upper and lower extremities  CHEST/LUNG: Decreased BS, L rales, no rhonchi, wheezing, or rubs  HEART: Regular rate and rhythm; No murmurs, rubs, or gallops  ABDOMEN: Soft, Nontender, Nondistended; Bowel sounds present,  EXTREMITIES: trace Edema  SKIN: No rashes or lesions, pale      LABS:                                 9.5    11.84 )-----------( 465      ( 10 Oct 2020 06:48 )             32.1     10-10    138  |  97  |  43<H>  ----------------------------<  203<H>  4.8   |  38<H>  |  1.30    Ca    9.4      10 Oct 2020 06:48  Phos  2.9     10-10  Mg     2.4     10-10    TPro  6.8  /  Alb  2.5<L>  /  TBili  0.2  /  DBili  x   /  AST  17  /  ALT  19  /  AlkPhos  76  10-10      Urinalysis Basic - ( 08 Oct 2020 14:18 )    Color: Yellow / Appearance: Clear / S.015 / pH: x  Gluc: x / Ketone: Trace  / Bili: Negative / Urobili: Negative   Blood: x / Protein: 30 mg/dL / Nitrite: Negative   Leuk Esterase: Negative / RBC: 0-2 /HPF / WBC 3-5   Sq Epi: x / Non Sq Epi: Occasional / Bacteria: Occasional      Magnesium, Serum: 2.4 mg/dL (10-10 @ 06:48)  Phosphorus Level, Serum: 2.9 mg/dL (10-10 @ 06:48)          RADIOLOGY & ADDITIONAL TESTS:

## 2020-10-10 NOTE — PROGRESS NOTE ADULT - PROBLEM SELECTOR PLAN 4
on metformin and glargine 12 U qhs, hold metformin while in hospital  - glargine 10units qhs - tirated up from 8units in setting of steroids, hyperglycemia  - continue humalog 3 units TID premeal   - moderate dose ISS  - accuchecks, hypoglycemia protocol  - Hba1c: 6.0.

## 2020-10-10 NOTE — PROVIDER CONTACT NOTE (OTHER) - ACTION/TREATMENT ORDERED:
RN provided education to pt to call for assistance when needed. MD and nursing supervisor made aware. Will continue to monitor.

## 2020-10-10 NOTE — PROGRESS NOTE ADULT - ASSESSMENT
60F w/ end stage COPD on 3LNC (pending transplant list at [facility]), former smoker, CAD s/p stent (2016), Afib on Eliquis,  uncontrolled DM2 (on insulin), raynaud phenomenon and recent hospitalization at [facility] for confusion and AURORA p/w sob, admitted for COPD exacerbation and multifocal PNA    CAD s/p stent   - Continue asa, statin  - Denies ischemic symptoms   - EKG showed SR @ 109, RBBB    Paroxysmal Afib  - She continues to have tachy episodes with HR in the 110s but ST, SR with rates 80- 90s mostly   - She is deconditioned and has significant COPD/pneumonia contributing to her increased rates  - Can consider restarting Duoneb as patient not tolerating Xopenex given patient elevated rates are sinus tachy and are more likely related to her COPD and pneumonia   - Continue Eliquis 5mg  - Continue Cardizem   - TTE w/ mild concentric LVH grossly nL LVSF ef 55%, mild MR    COPD/Pneumonia  - CT chest noted  - per pulm/ID  - Continue steroids and antibiotics     VTE ppx  - Continue apixaban      - Monitor and replete lytes, keep K>4, Mg>2.  - All other medical needs as per primary team.  - Other cardiovascular workup will depend on clinical course.  - Will continue to follow.    Lisa Soto, MS FNP, [facility]  Nurse Practitioner- Cardiology   Spectra #[phone]

## 2020-10-10 NOTE — PROGRESS NOTE ADULT - ASSESSMENT
AURORA on CKD 2  Hyponatremia  Hypokalemia  COPD  Hypercalcemia     -BLCr 1  -AURORA unclear etiology, clinically ATN  -UA - 3-5 WBC, trace ketones, 30 proteins  -FeUrea 34.6%  -UPC 0.44  -Ur osm 454  -Corrected calcium 10.2, with paraprotein gap and anemia; FLC ratio is normal; SPEP is pending  -Iron repletion   -Bumex on hold, metformin on hold  -Renal indices are improving back to baseline with good urine output  -Less likely AIN given paucity of WBC on UA and no peripheral eosinophils  -Did not receive albumin as patient was anxious about it.   -No IVF needed at present  -Will hold off on Albumin for today  -Strict I&Os    Attempted to call Dr. Mathew [phone], however office was closed.    D/W Dr. Ruiz

## 2020-10-10 NOTE — PROGRESS NOTE ADULT - ATTENDING COMMENTS
60F w/ end stage COPD on 3LNC (trying to get on  transplant list at [facility]), former smoker, CAD s/p stent (2016), afib on eliquis, uncontrolled DM2 (on insulin), raynaud phenomenon and recent hospitalization at [facility] for confusion and AUR[facility]A p/w sob, admitted for acute on chronic resp failure with hypoxia and hyercarbia sec to multifocal PNA and COPD exacerabtion with end stage COPD. Plan: cont atrovent, antibx, monitor clinical course, apprec PT eval tomorrow, dc planning once pt improves to baseline, wean trials daily

## 2020-10-10 NOTE — PROGRESS NOTE ADULT - SUBJECTIVE AND OBJECTIVE BOX
[facility] -- Adriana Gonzales Grossman, [place], Dylan Whitley [place], [place]: Office # [phone]    Follow Up:  SOB     Subjective/Observations: Patient seen and examined. Patient awake, alert, resting in bed. Patient continues to be SOB with any activity and is unable to even walk short distance. No reaction to nebs anymore. No chest pain, No dizziness or lightheadedness.     PAST MEDICAL & SURGICAL HISTORY:  Ascorbic acid deficiency  Iron deficiency anemia  Constipation  GERD without esophagitis  Type 2 diabetes mellitus  HTN (hypertension)  Heart failure  End stage COPD  on 3LNC  Atrial fibrillation  Hyperlipemia  Chronic sinusitis  Raynaud phenomenon  Claustrophobia  COPD (chronic obstructive pulmonary disease)  History of tonsillectomy  S/P tonsillectomy    REVIEW OF SYSTEMS: All review of systems is negative for eye, ENT, GI, , allergic, dermatologic, musculoskeletal and neurologic except as described above      MEDICATIONS  (STANDING):  apixaban 5 milliGRAM(s) Oral every 12 hours  aspirin  chewable 81 milliGRAM(s) Oral daily  atorvastatin 80 milliGRAM(s) Oral at bedtime  azithromycin   Tablet 250 milliGRAM(s) Oral daily  budesonide 160 MICROgram(s)/formoterol 4.5 MICROgram(s) Inhaler 2 Puff(s) Inhalation two times a day  cefTRIAXone   IVPB 1000 milliGRAM(s) IV Intermittent every 24 hours  cholecalciferol 1000 Unit(s) Oral daily  dextrose 5%. 1000 milliLiter(s) (50 mL/Hr) IV Continuous <Continuous>  dextrose 50% Injectable 12.5 Gram(s) IV Push once  dextrose 50% Injectable 25 Gram(s) IV Push once  dextrose 50% Injectable 25 Gram(s) IV Push once  diltiazem    milliGRAM(s) Oral daily  ferrous    sulfate 325 milliGRAM(s) Oral daily  guaiFENesin  milliGRAM(s) Oral every 12 hours  insulin glargine Injectable (LANTUS) 10 Unit(s) SubCutaneous at bedtime  insulin lispro (HumaLOG) corrective regimen sliding scale   SubCutaneous three times a day before meals  insulin lispro Injectable (HumaLOG) 3 Unit(s) SubCutaneous three times a day before meals  ipratropium    for Nebulization 500 MICROGram(s) Nebulizer every 6 hours  montelukast 10 milliGRAM(s) Oral at bedtime  multivitamin 1 Tablet(s) Oral daily  pantoprazole    Tablet 40 milliGRAM(s) Oral before breakfast  polyethylene glycol 3350 17 Gram(s) Oral daily  predniSONE   Tablet 40 milliGRAM(s) Oral daily  senna 2 Tablet(s) Oral at bedtime  tiotropium 18 MICROgram(s) Capsule 1 Capsule(s) Inhalation daily    MEDICATIONS  (PRN):  acetaminophen   Tablet .. 650 milliGRAM(s) Oral every 6 hours PRN Mild Pain (1 - 3)  bisacodyl Suppository 10 milliGRAM(s) Rectal daily PRN Constipation  dextrose 40% Gel 15 Gram(s) Oral once PRN Blood Glucose LESS THAN 70 milliGRAM(s)/deciliter  glucagon  Injectable 1 milliGRAM(s) IntraMuscular once PRN Glucose LESS THAN 70 milligrams/deciliter  lactulose Syrup 15 Gram(s) Oral two times a day PRN constipation  oxyCODONE    IR 5 milliGRAM(s) Oral every 6 hours PRN Moderate Pain (4 - 6)    Allergies  No Known Allergies    Intolerances  albuterol (Unknown)    Vital Signs Last 24 Hrs  T(C): 36.3 (10 Oct 2020 06:04), Max: 36.9 (09 Oct 2020 20:49)  T(F): 97.4 (10 Oct 2020 06:04), Max: 98.4 (09 Oct 2020 20:49)  HR: 92 (10 Oct 2020 08:13) (73 - 96)  BP: 157/84 (10 Oct 2020 06:04) (134/79 - 157/84)  BP(mean): --  RR: 18 (10 Oct 2020 06:04) (18 - 18)  SpO2: 99% (10 Oct 2020 08:13) (93% - 100%)    I&O's Summary    09 Oct 2020 07:01  -  10 Oct 2020 07:00  --------------------------------------------------------  IN: 0 mL / OUT: 700 mL / NET: -700 mL    TELE: SR 80-90s at times 110s   PHYSICAL EXAM:  Appearance: NAD, no distress, alert, Frail   HEENT: Moist Mucous Membranes, Anicteric  Cardiovascular: Regular rate and rhythm, Normal S1 S2, No JVD, No murmurs, No rubs, gallops or clicks  Respiratory: Non-labored, Clear to auscultation, Diminished No rales, No rhonchi, No wheezing.   Gastrointestinal:  Soft, Non-tender, + BS  Neurologic: Non-focal  Skin: Warm and dry, No visible rashes or ulcers, No ecchymosis, No cyanosis  Musculoskeletal: No clubbing, No cyanosis, No joint swelling/tenderness  Psychiatry: Mood & affect appropriate  Lymph: No peripheral edema.     LABS: All Labs Reviewed:                        9.5    11.84 )-----------( 465      ( 10 Oct 2020 06:48 )             32.1                         9.6    12.81 )-----------( 504      ( 09 Oct 2020 07:32 )             31.9                         9.9    12.92 )-----------( 489      ( 08 Oct 2020 09:17 )             31.8     10 Oct 2020 06:48    138    |  97     |  43     ----------------------------<  203    4.8     |  38     |  1.30   09 Oct 2020 07:32    137    |  97     |  53     ----------------------------<  239    5.0     |  37     |  1.90   08 Oct 2020 09:17    133    |  91     |  39     ----------------------------<  220    3.2     |  34     |  1.50     Ca    9.4        10 Oct 2020 06:48  Ca    9.2        09 Oct 2020 07:32  Ca    9.6        08 Oct 2020 09:17  Phos  2.9       10 Oct 2020 06:48  Phos  2.9       09 Oct 2020 07:32  Mg     2.4       10 Oct 2020 06:48  Mg     2.1       09 Oct 2020 07:32  Mg     2.0       08 Oct 2020 09:17    TPro  6.8    /  Alb  2.5    /  TBili  0.2    /  DBili  x      /  AST  17     /  ALT  19     /  AlkPhos  76     10 Oct 2020 06:48  TPro  7.2    /  Alb  2.5    /  TBili  0.2    /  DBili  x      /  AST  21     /  ALT  21     /  AlkPhos  81     09 Oct 2020 07:32  TPro  7.7    /  Alb  2.6    /  TBili  0.3    /  DBili  x      /  AST  20     /  ALT  13     /  AlkPhos  83     08 Oct 2020 09:17      12 Lead ECG:   Ventricular Rate 109 BPM  Atrial Rate 109 BPM  P-R Interval 150 ms  QRS Duration 136 ms  Q-T Interval 390 ms  QTC Calculation(Bazett) 525 ms  P Axis 56 degrees  R Axis -16 degrees  T Axis 57 degrees  Diagnosis Line Sinus tachycardia with premature supraventricular complexes  Right bundle branch block  Abnormal ECG  Confirmed by LUIS ENRIQUE WHITLEY ([id number]) on 10/8/2020 11:41:04 AM (10-08-20 @ 08:51)

## 2020-10-10 NOTE — PROGRESS NOTE ADULT - PROBLEM SELECTOR PLAN 7
EKG showing sinus tachycardia  - on eliquis 5mg bid and cardizem 240mg qd (home meds)  - hold theophylline and monitor on remote tele, consider tele upgrade if continues arrhythmic patter, apprec pharmD Dr Ackerman collaboration in therapeutic management and discussion/education with patient  - D/w Cardio (Dr. Whitley) - increase Cardizem to 240 mg qd  -Cardio rec (Dr. Richardson): consider restarting Duoneb; continue eliquis and cardiazem; PT for deconditioning

## 2020-10-10 NOTE — PROGRESS NOTE ADULT - SUBJECTIVE AND OBJECTIVE BOX
Patient is a 60y old  Female who presents with a chief complaint of COPD exacerbation, PNA (10 Oct 2020 10:18)      INTERVAL HPI/OVERNIGHT EVENTS: No acute overnight events. Pt seen and examined at bedside. Pt says she is still short of breath, uses BIPAP intermittently and on 5 L NC. She has no further complaints. Denies fever, chills, chest pain, coughing or wheezing, abdominal pain. Per tele, pt currently in NSR with HR in 80's.    MEDICATIONS  (STANDING):  apixaban 5 milliGRAM(s) Oral every 12 hours  aspirin  chewable 81 milliGRAM(s) Oral daily  atorvastatin 80 milliGRAM(s) Oral at bedtime  azithromycin   Tablet 250 milliGRAM(s) Oral daily  budesonide 160 MICROgram(s)/formoterol 4.5 MICROgram(s) Inhaler 2 Puff(s) Inhalation two times a day  cefTRIAXone   IVPB 1000 milliGRAM(s) IV Intermittent every 24 hours  cholecalciferol 1000 Unit(s) Oral daily  dextrose 5%. 1000 milliLiter(s) (50 mL/Hr) IV Continuous <Continuous>  dextrose 50% Injectable 12.5 Gram(s) IV Push once  dextrose 50% Injectable 25 Gram(s) IV Push once  dextrose 50% Injectable 25 Gram(s) IV Push once  diltiazem    milliGRAM(s) Oral daily  ferrous    sulfate 325 milliGRAM(s) Oral daily  guaiFENesin  milliGRAM(s) Oral every 12 hours  insulin glargine Injectable (LANTUS) 10 Unit(s) SubCutaneous at bedtime  insulin lispro (HumaLOG) corrective regimen sliding scale   SubCutaneous three times a day before meals  insulin lispro Injectable (HumaLOG) 3 Unit(s) SubCutaneous three times a day before meals  ipratropium    for Nebulization 500 MICROGram(s) Nebulizer every 6 hours  montelukast 10 milliGRAM(s) Oral at bedtime  multivitamin 1 Tablet(s) Oral daily  pantoprazole    Tablet 40 milliGRAM(s) Oral before breakfast  polyethylene glycol 3350 17 Gram(s) Oral daily  predniSONE   Tablet 40 milliGRAM(s) Oral daily  senna 2 Tablet(s) Oral at bedtime  tiotropium 18 MICROgram(s) Capsule 1 Capsule(s) Inhalation daily    MEDICATIONS  (PRN):  acetaminophen   Tablet .. 650 milliGRAM(s) Oral every 6 hours PRN Mild Pain (1 - 3)  bisacodyl Suppository 10 milliGRAM(s) Rectal daily PRN Constipation  dextrose 40% Gel 15 Gram(s) Oral once PRN Blood Glucose LESS THAN 70 milliGRAM(s)/deciliter  glucagon  Injectable 1 milliGRAM(s) IntraMuscular once PRN Glucose LESS THAN 70 milligrams/deciliter  lactulose Syrup 15 Gram(s) Oral two times a day PRN constipation  oxyCODONE    IR 5 milliGRAM(s) Oral every 6 hours PRN Moderate Pain (4 - 6)      Allergies    No Known Allergies    Intolerances    albuterol (Unknown)      REVIEW OF SYSTEMS:  CONSTITUTIONAL: No fever or chills  HEENT:  No headache, no sore throat  RESPIRATORY: +shortness of breath, No cough, no wheezing  CARDIOVASCULAR: No chest pain, palpitations  GASTROINTESTINAL: No abd pain, nausea, vomiting, or diarrhea  GENITOURINARY: No dysuria, frequency, or hematuria  NEUROLOGICAL: no focal weakness or dizziness  MUSCULOSKELETAL: no myalgias     Vital Signs Last 24 Hrs  T(C): 36.3 (10 Oct 2020 06:04), Max: 36.9 (09 Oct 2020 20:49)  T(F): 97.4 (10 Oct 2020 06:04), Max: 98.4 (09 Oct 2020 20:49)  HR: 92 (10 Oct 2020 08:13) (73 - 96)  BP: 157/84 (10 Oct 2020 06:04) (134/79 - 157/84)  BP(mean): --  RR: 18 (10 Oct 2020 06:04) (18 - 18)  SpO2: 99% (10 Oct 2020 08:13) (93% - 100%)    PHYSICAL EXAM:  GENERAL: NAD, anxious appearing, on NC  HEENT:  anicteric, EOMI b/l  CHEST/LUNG: decreased breath sounds, no rales, wheezes, or rhonchi  HEART:  RRR, S1, S2  ABDOMEN:  BS+, soft, nontender, nondistended  EXTREMITIES: no edema, cyanosis, or calf tenderness  NERVOUS SYSTEM: answers questions and follows commands appropriately    LABS:                        9.5    11.84 )-----------( 465      ( 10 Oct 2020 06:48 )             32.1     CBC Full  -  ( 10 Oct 2020 06:48 )  WBC Count : 11.84 K/uL  Hemoglobin : 9.5 g/dL  Hematocrit : 32.1 %  Platelet Count - Automated : 465 K/uL  Mean Cell Volume : 80.7 fl  Mean Cell Hemoglobin : 23.9 pg  Mean Cell Hemoglobin Concentration : 29.6 gm/dL  Auto Neutrophil # : 8.69 K/uL  Auto Lymphocyte # : 1.38 K/uL  Auto Monocyte # : 1.07 K/uL  Auto Eosinophil # : 0.20 K/uL  Auto Basophil # : 0.03 K/uL  Auto Neutrophil % : 73.3 %  Auto Lymphocyte % : 11.7 %  Auto Monocyte % : 9.0 %  Auto Eosinophil % : 1.7 %  Auto Basophil % : 0.3 %    10 Oct 2020 06:48    138    |  97     |  43     ----------------------------<  203    4.8     |  38     |  1.30     Ca    9.4        10 Oct 2020 06:48  Phos  2.9       10 Oct 2020 06:48  Mg     2.4       10 Oct 2020 06:48    TPro  6.8    /  Alb  2.5    /  TBili  0.2    /  DBili  x      /  AST  17     /  ALT  19     /  AlkPhos  76     10 Oct 2020 06:48      Urinalysis Basic - ( 08 Oct 2020 14:18 )    Color: Yellow / Appearance: Clear / S.015 / pH: x  Gluc: x / Ketone: Trace  / Bili: Negative / Urobili: Negative   Blood: x / Protein: 30 mg/dL / Nitrite: Negative   Leuk Esterase: Negative / RBC: 0-2 /HPF / WBC 3-5   Sq Epi: x / Non Sq Epi: Occasional / Bacteria: Occasional      CAPILLARY BLOOD GLUCOSE      POCT Blood Glucose.: 197 mg/dL (10 Oct 2020 08:02)  POCT Blood Glucose.: 173 mg/dL (09 Oct 2020 21:31)  POCT Blood Glucose.: 186 mg/dL (09 Oct 2020 17:12)  POCT Blood Glucose.: 312 mg/dL (09 Oct 2020 12:19)        Culture - Sputum (collected 10-09-20 @ 06:28)  Source: .Sputum Sputum  Gram Stain (10-09-20 @ 13:47):    No polymorphonuclear leukocytes per low power field    Few Squamous epithelial cells per low power field    Few Gram Positive Cocci in Clusters per oil power field    Culture - Blood (collected 10-06-20 @ 09:23)  Source: .Blood Blood  Preliminary Report (10-07-20 @ 10:01):    No growth to date.    Culture - Blood (collected 10-06-20 @ 09:23)  Source: .Blood Blood  Preliminary Report (10-07-20 @ 10:01):    No growth to date.                          Consultant(s) Notes Reviewed:  [x] YES  [ ] NO

## 2020-10-10 NOTE — PROGRESS NOTE ADULT - PROBLEM SELECTOR PLAN 3
Improving ; unclear etiology - possibly 2/2 to prerenal vs hypoxemic  - nephro (Dr. Mullen) consulted, recs appreciated - Check UA, Ur lytes, UPC, Ur osm, Corrected calcium 10.2, with paraprotein gap and anemia,  check FLC and SPEP  -Iron 38; unsat ; TIBC 236; % sat 16   - Renal US: no hydronephrosis   - bumetanide held 2/2 to ATI  - trend renal indices  - avoid nephrotoxic meds   - renally dose meds.  - Did not receive albumen today as patient was anxious about it

## 2020-10-10 NOTE — PROGRESS NOTE ADULT - PROBLEM SELECTOR PLAN 8
chronic, resolved   - cont senna and miralax standing  - lactulose and dulcolax suppository PRN for constipation

## 2020-10-10 NOTE — PROGRESS NOTE ADULT - PROBLEM SELECTOR PLAN 2
on 3LNC at rehab, currently requiring 5L nc with BIPAP, wean as tolerated  -on spiriva, symbicort, montelukast,  inhaler PRN at rehab, continue  -theophylline held given tachycardia and brief afib  - BIPAP qhs and PRN  -of note pt is not on transplant list yet, is planning to go on list at [facility] if medically stable and improved.

## 2020-10-10 NOTE — PROVIDER CONTACT NOTE (OTHER) - SITUATION
Pt requesting bed to be up high. Pt states it helps her breath better with AC blowing at her. Pt states she will call for help if she needs to be OOB.

## 2020-10-10 NOTE — PROGRESS NOTE ADULT - SUBJECTIVE AND OBJECTIVE BOX
PULMONARY/CRITICAL CARE  Still sob. Congested.    Wore cpap hs.   Asked to take over pulmonary care.     Patient is a 60y old  Female who presents with a chief complaint of COPD exacerbation, PNA (06 Oct 2020 14:10)    BRIEF HOSPITAL COURSE: ***61y/o woman with end stage COPD on 3L O2 at home awaiting transplant at [facility], former smoker, CAD s/p stent, afib, DM2), raynaud phenomenon and recent hospitalization at [facility] for confusion and [facility]. He is very short of breath at rest, worst with any movement, unable to complete her sentences. No fever. Has some cough but no sputum at this time.   CXR done showing b/l patchy infiltrates,   CT chest done showing multifocal PNA and reactive mediastinal lymphadenopathy  She has been started on azithromycin and ceftriaxone and ID was called for further recommendation.   Pt. improved today.  Uses CPAP hs and prn.  No sputum.    Events last 24 hours: ***    PAST MEDICAL & SURGICAL HISTORY:  H/O Raynaud&#x27;s syndrome    Ascorbic acid deficiency    Iron deficiency anemia    Constipation    GERD without esophagitis    Type 2 diabetes mellitus    HTN (hypertension)    Heart failure    HLD (hyperlipidemia)    History of chronic atrial fibrillation  on Eliquis    End stage COPD  on 3LNC    History of tonsillectomy      Allergies    No Known Allergies    Intolerances      FAMILY HISTORY:  FH: myocardial infarction    Family history of CABG            Medications:  azithromycin  IVPB 500 milliGRAM(s) IV Intermittent every 24 hours  piperacillin/tazobactam IVPB.. 3.375 Gram(s) IV Intermittent every 8 hours    buMETAnide 1 milliGRAM(s) Oral two times a day  diltiazem    milliGRAM(s) Oral daily    albuterol/ipratropium for Nebulization 3 milliLiter(s) Nebulizer every 3 hours PRN  budesonide 160 MICROgram(s)/formoterol 4.5 MICROgram(s) Inhaler 2 Puff(s) Inhalation two times a day  guaiFENesin   Syrup  (Sugar-Free) 100 milliGRAM(s) Oral every 6 hours PRN  montelukast 10 milliGRAM(s) Oral at bedtime  theophylline ER Capsule 300 milliGRAM(s) Oral <User Schedule>  tiotropium 18 MICROgram(s) Capsule 1 Capsule(s) Inhalation daily    acetaminophen   Tablet .. 650 milliGRAM(s) Oral every 6 hours PRN  oxyCODONE    IR 5 milliGRAM(s) Oral every 6 hours PRN      apixaban 5 milliGRAM(s) Oral every 12 hours  aspirin  chewable 81 milliGRAM(s) Oral daily    bisacodyl Suppository 10 milliGRAM(s) Rectal daily PRN  lactulose Syrup 15 Gram(s) Oral two times a day PRN  pantoprazole    Tablet 40 milliGRAM(s) Oral before breakfast  polyethylene glycol 3350 17 Gram(s) Oral daily  senna 2 Tablet(s) Oral at bedtime      atorvastatin 80 milliGRAM(s) Oral at bedtime  dextrose 40% Gel 15 Gram(s) Oral once PRN  dextrose 50% Injectable 12.5 Gram(s) IV Push once  dextrose 50% Injectable 25 Gram(s) IV Push once  dextrose 50% Injectable 25 Gram(s) IV Push once  glucagon  Injectable 1 milliGRAM(s) IntraMuscular once PRN  insulin glargine Injectable (LANTUS) 10 Unit(s) SubCutaneous at bedtime  insulin lispro (HumaLOG) corrective regimen sliding scale   SubCutaneous three times a day before meals  methylPREDNISolone sodium succinate Injectable 40 milliGRAM(s) IV Push daily    ascorbic acid 500 milliGRAM(s) Oral daily  cholecalciferol 1000 Unit(s) Oral daily  dextrose 5%. 1000 milliLiter(s) IV Continuous <Continuous>  ferrous    sulfate 325 milliGRAM(s) Oral daily  magnesium sulfate  IVPB 1 Gram(s) IV Intermittent once  multivitamin 1 Tablet(s) Oral daily                ICU Vital Signs Last 24 Hrs  T(C): 36.4 (07 Oct 2020 06:29), Max: 36.7 (06 Oct 2020 09:27)  T(F): 97.5 (07 Oct 2020 06:29), Max: 98.1 (06 Oct 2020 12:34)  HR: 84 (07 Oct 2020 08:09) (54 - 100)  BP: 110/68 (07 Oct 2020 06:29) (110/68 - 149/68)  BP(mean): --  ABP: --  ABP(mean): --  RR: 18 (07 Oct 2020 06:29) (18 - 22)  SpO2: 97% (07 Oct 2020 08:09) (90% - 98%)    Vital Signs Last 24 Hrs  T(C): 36.4 (07 Oct 2020 06:29), Max: 36.7 (06 Oct 2020 09:27)  T(F): 97.5 (07 Oct 2020 06:29), Max: 98.1 (06 Oct 2020 12:34)  HR: 84 (07 Oct 2020 08:09) (54 - 100)  BP: 110/68 (07 Oct 2020 06:29) (110/68 - 149/68)  BP(mean): --  RR: 18 (07 Oct 2020 06:29) (18 - 22)  SpO2: 97% (07 Oct 2020 08:09) (90% - 98%)        I&O's Detail        LABS:                        10.3   8.87  )-----------( 513      ( 07 Oct 2020 06:55 )             33.0     10-07    131<L>  |  86<L>  |  32<H>  ----------------------------<  230<H>  3.6   |  36<H>  |  1.40<H>    Ca    9.6      07 Oct 2020 06:55  Phos  4.7     10-07  Mg     1.5     10-07    TPro  7.8  /  Alb  2.6<L>  /  TBili  0.3  /  DBili  x   /  AST  19  /  ALT  13  /  AlkPhos  88  10-07          CAPILLARY BLOOD GLUCOSE      POCT Blood Glucose.: 269 mg/dL (07 Oct 2020 08:18)        CULTURES:      Physical Examination:    General: mild sob.    HEENT: Pupils equal, reactive to light.  Symmetric.    PULM: decreased bs few rhonchi, no change percussion no rales, wheeze    CVS: Regular rate and rhythm, no murmurs, rubs, or gallops    ABD: Soft, nondistended, nontender, normoactive bowel sounds, no masses    EXT: No edema, nontender    SKIN: Warm and well perfused, no rashes noted.    NEURO: Alert, oriented, interactive, nonfocal    RADIOLOGY: ***d< from: CT Chest No Cont (10.06.20 @ 04:52) >  EXAM:  CT CHEST                            PROCEDURE DATE:  10/06/2020          INTERPRETATION:  CLINICAL INFORMATION: Rule out pneumonia. Chronic obstructive pulmonary disease.    COMPARISON: Same day radiograph    PROCEDURE:  CT of the Chest was performed without intravenous contrast.  Sagittal and coronal reformats were performed. 3-D MIPS images were acquired on the axial plane.    FINDINGS:    LUNGS AND AIRWAYS: Patent central airways.  Centrilobular emphysema in both lungs. Numerous nodular densities with septal thickening showing tree-in-bud appearance in the right upper lobe, right middle lobe, left upper lobe and left lower lobe. There is no atelectasis.  PLEURA: No pleural effusion.  MEDIASTINUM AND LILIANA: 1.4 cm sized right paratracheal lymph node. A few subcentimeter mediastinal lymph nodes.  VESSELS: Moderate atherosclerotic calcification.  HEART: Heart size is normal. No pericardial effusion.  CHEST WALL AND LOWER NECK: Within normal limits.  VISUALIZED UPPER ABDOMEN: Within normal limits.  BONES: Degenerative change.    IMPRESSION:  Multifocal pneumonia involving both lungs as described.  Reactive mediastinal lymphadenopathy.              ROBBIE LEAL M.D., ATTENDING RADIOLOGIST  This document has been electronically signed. Oct  6 2020  5:12AM    < end of copied text >      < from: Xray Chest 1 View- PORTABLE-Routine (Xray Chest 1 View- PORTABLE-Routine in AM.) (10.08.20 @ 09:55) >  EXAM:  XR CHEST PORTABLE ROUTINE 1V                            PROCEDURE DATE:  10/08/2020          INTERPRETATION:  Chest portable.    Clinical History: Shortness of breath. Pneumonia.    Comparison: 10/6/2020.    Single AP view submitted.    The evaluation of the cardiomediastinal silhouette is limited on portable technique.    Paucity of lung markings in the upper lung zones is compatible with emphysematous disease.    Prominent reticulonodular opacities right mid and lower lung zone  There is probable bronchial wall thickening at the bilateral lower lung zones.  No lobar lung consolidation is noted.    Impression:    Findings as discussed above.    < end of copied text >

## 2020-10-10 NOTE — PROGRESS NOTE ADULT - PROBLEM SELECTOR PLAN 1
likely multifactorial from multifocal PNA and COPD exacerbation  - CXR: b/l lung infiltrates; emphysema   - CT chest done showing multifocal PNA  - continue PO solumedrol 40 mg daily   - on spiriva, symbicort, montelukast,  inhaler PRN   - ID consulted (Dr. Zhu) recs appreciated, - Blood culture NGTD; on ceftriaxone 1gm daily and azithromycin 250mg po daily  - currently on NC with BIPAP maintain O2 >/ 88  - legionella neg  -sputum cx: few gram positive cocci   - TTE (10/6/20) LVEF of 55% - no vegetations appreciated   - pulm Dr. Rojas consulted, recs appreciated: trial atrovent hhn helped, add mucinex  - apprec palliative care support in management due to complexity of care and nature of history

## 2020-10-10 NOTE — PROGRESS NOTE ADULT - ASSESSMENT
60F w/ end stage COPD on 3LNC (trying to get on  transplant list at [facility]), former smoker, CAD s/p stent (2016), afib on eliquis, uncontrolled DM2 (on insulin), raynaud phenomenon and recent hospitalization at [facility] for confusion and AURORA p/w sob, admitted for acute on chronic resp failure with hypoxia and hyercarbia sec to multifocal PNA and COPD exacerabtion with end stage COPD.

## 2020-10-11 LAB
ALBUMIN SERPL ELPH-MCNC: 2.4 G/DL — LOW (ref 3.3–5)
ALP SERPL-CCNC: 70 U/L — SIGNIFICANT CHANGE UP (ref 40–120)
ALT FLD-CCNC: 16 U/L — SIGNIFICANT CHANGE UP (ref 12–78)
ANION GAP SERPL CALC-SCNC: 6 MMOL/L — SIGNIFICANT CHANGE UP (ref 5–17)
AST SERPL-CCNC: 12 U/L — LOW (ref 15–37)
BASOPHILS # BLD AUTO: 0.03 K/UL — SIGNIFICANT CHANGE UP (ref 0–0.2)
BASOPHILS NFR BLD AUTO: 0.3 % — SIGNIFICANT CHANGE UP (ref 0–2)
BILIRUB SERPL-MCNC: 0.2 MG/DL — SIGNIFICANT CHANGE UP (ref 0.2–1.2)
BUN SERPL-MCNC: 36 MG/DL — HIGH (ref 7–23)
CALCIUM SERPL-MCNC: 9.2 MG/DL — SIGNIFICANT CHANGE UP (ref 8.5–10.1)
CHLORIDE SERPL-SCNC: 99 MMOL/L — SIGNIFICANT CHANGE UP (ref 96–108)
CO2 SERPL-SCNC: 34 MMOL/L — HIGH (ref 22–31)
CREAT SERPL-MCNC: 0.94 MG/DL — SIGNIFICANT CHANGE UP (ref 0.5–1.3)
CULTURE RESULTS: SIGNIFICANT CHANGE UP
EOSINOPHIL # BLD AUTO: 0.31 K/UL — SIGNIFICANT CHANGE UP (ref 0–0.5)
EOSINOPHIL NFR BLD AUTO: 2.8 % — SIGNIFICANT CHANGE UP (ref 0–6)
GLUCOSE SERPL-MCNC: 317 MG/DL — HIGH (ref 70–99)
HCT VFR BLD CALC: 30.4 % — LOW (ref 34.5–45)
HGB BLD-MCNC: 8.9 G/DL — LOW (ref 11.5–15.5)
IMM GRANULOCYTES NFR BLD AUTO: 3.3 % — HIGH (ref 0–1.5)
LYMPHOCYTES # BLD AUTO: 1.25 K/UL — SIGNIFICANT CHANGE UP (ref 1–3.3)
LYMPHOCYTES # BLD AUTO: 11.5 % — LOW (ref 13–44)
MCHC RBC-ENTMCNC: 23.9 PG — LOW (ref 27–34)
MCHC RBC-ENTMCNC: 29.3 GM/DL — LOW (ref 32–36)
MCV RBC AUTO: 81.7 FL — SIGNIFICANT CHANGE UP (ref 80–100)
MONOCYTES # BLD AUTO: 1.04 K/UL — HIGH (ref 0–0.9)
MONOCYTES NFR BLD AUTO: 9.6 % — SIGNIFICANT CHANGE UP (ref 2–14)
NEUTROPHILS # BLD AUTO: 7.89 K/UL — HIGH (ref 1.8–7.4)
NEUTROPHILS NFR BLD AUTO: 72.5 % — SIGNIFICANT CHANGE UP (ref 43–77)
NRBC # BLD: 0 /100 WBCS — SIGNIFICANT CHANGE UP (ref 0–0)
PLATELET # BLD AUTO: 483 K/UL — HIGH (ref 150–400)
POTASSIUM SERPL-MCNC: 4.4 MMOL/L — SIGNIFICANT CHANGE UP (ref 3.5–5.3)
POTASSIUM SERPL-SCNC: 4.4 MMOL/L — SIGNIFICANT CHANGE UP (ref 3.5–5.3)
PROT SERPL-MCNC: 6.3 G/DL — SIGNIFICANT CHANGE UP (ref 6–8.3)
RBC # BLD: 3.72 M/UL — LOW (ref 3.8–5.2)
RBC # FLD: 16.4 % — HIGH (ref 10.3–14.5)
SODIUM SERPL-SCNC: 139 MMOL/L — SIGNIFICANT CHANGE UP (ref 135–145)
SPECIMEN SOURCE: SIGNIFICANT CHANGE UP
WBC # BLD: 10.88 K/UL — HIGH (ref 3.8–10.5)
WBC # FLD AUTO: 10.88 K/UL — HIGH (ref 3.8–10.5)

## 2020-10-11 PROCEDURE — 99232 SBSQ HOSP IP/OBS MODERATE 35: CPT

## 2020-10-11 PROCEDURE — 99233 SBSQ HOSP IP/OBS HIGH 50: CPT

## 2020-10-11 PROCEDURE — 99233 SBSQ HOSP IP/OBS HIGH 50: CPT | Mod: GC

## 2020-10-11 RX ORDER — AZITHROMYCIN 500 MG/1
500 TABLET, FILM COATED ORAL DAILY
Refills: 0 | Status: DISCONTINUED | OUTPATIENT
Start: 2020-10-11 | End: 2020-10-13

## 2020-10-11 RX ADMIN — APIXABAN 5 MILLIGRAM(S): 2.5 TABLET, FILM COATED ORAL at 17:15

## 2020-10-11 RX ADMIN — Medication 500 MICROGRAM(S): at 14:06

## 2020-10-11 RX ADMIN — BUDESONIDE AND FORMOTEROL FUMARATE DIHYDRATE 2 PUFF(S): 160; 4.5 AEROSOL RESPIRATORY (INHALATION) at 20:16

## 2020-10-11 RX ADMIN — PANTOPRAZOLE SODIUM 40 MILLIGRAM(S): 20 TABLET, DELAYED RELEASE ORAL at 06:11

## 2020-10-11 RX ADMIN — MONTELUKAST 10 MILLIGRAM(S): 4 TABLET, CHEWABLE ORAL at 21:25

## 2020-10-11 RX ADMIN — TIOTROPIUM BROMIDE 1 CAPSULE(S): 18 CAPSULE ORAL; RESPIRATORY (INHALATION) at 21:24

## 2020-10-11 RX ADMIN — INSULIN GLARGINE 10 UNIT(S): 100 INJECTION, SOLUTION SUBCUTANEOUS at 21:25

## 2020-10-11 RX ADMIN — POLYETHYLENE GLYCOL 3350 17 GRAM(S): 17 POWDER, FOR SOLUTION ORAL at 12:23

## 2020-10-11 RX ADMIN — APIXABAN 5 MILLIGRAM(S): 2.5 TABLET, FILM COATED ORAL at 06:10

## 2020-10-11 RX ADMIN — Medication 600 MILLIGRAM(S): at 17:15

## 2020-10-11 RX ADMIN — Medication 81 MILLIGRAM(S): at 12:23

## 2020-10-11 RX ADMIN — Medication 3 UNIT(S): at 17:16

## 2020-10-11 RX ADMIN — Medication 1000 UNIT(S): at 12:23

## 2020-10-11 RX ADMIN — ATORVASTATIN CALCIUM 80 MILLIGRAM(S): 80 TABLET, FILM COATED ORAL at 21:24

## 2020-10-11 RX ADMIN — CEFTRIAXONE 100 MILLIGRAM(S): 500 INJECTION, POWDER, FOR SOLUTION INTRAMUSCULAR; INTRAVENOUS at 17:15

## 2020-10-11 RX ADMIN — Medication 500 MICROGRAM(S): at 20:03

## 2020-10-11 RX ADMIN — Medication 500 MICROGRAM(S): at 08:28

## 2020-10-11 RX ADMIN — Medication 325 MILLIGRAM(S): at 12:23

## 2020-10-11 RX ADMIN — Medication 3 UNIT(S): at 08:20

## 2020-10-11 RX ADMIN — Medication 6: at 08:19

## 2020-10-11 RX ADMIN — Medication 1 TABLET(S): at 12:23

## 2020-10-11 RX ADMIN — Medication 4: at 12:24

## 2020-10-11 RX ADMIN — AZITHROMYCIN 500 MILLIGRAM(S): 500 TABLET, FILM COATED ORAL at 12:23

## 2020-10-11 RX ADMIN — Medication 1: at 21:25

## 2020-10-11 RX ADMIN — Medication 240 MILLIGRAM(S): at 06:10

## 2020-10-11 RX ADMIN — Medication 40 MILLIGRAM(S): at 06:10

## 2020-10-11 RX ADMIN — SENNA PLUS 2 TABLET(S): 8.6 TABLET ORAL at 21:25

## 2020-10-11 RX ADMIN — Medication 4: at 17:16

## 2020-10-11 RX ADMIN — BUDESONIDE AND FORMOTEROL FUMARATE DIHYDRATE 2 PUFF(S): 160; 4.5 AEROSOL RESPIRATORY (INHALATION) at 08:20

## 2020-10-11 RX ADMIN — Medication 600 MILLIGRAM(S): at 06:10

## 2020-10-11 RX ADMIN — Medication 3 UNIT(S): at 12:24

## 2020-10-11 NOTE — PROGRESS NOTE ADULT - ATTENDING COMMENTS
60F w/ end stage COPD on 3LNC (trying to get on  transplant list at [facility]), former smoker, CAD s/p stent (2016), afib on eliquis, uncontrolled DM2 (on insulin), raynaud phenomenon and recent hospitalization at [facility] for confusion and AURORA p/w sob, admitted for acute on chronic resp failure with hypoxia and hyercarbia sec to multifocal PNA and COPD exacerabtion with end stage COPD. Plan: cont iv antibx, apprec id and pulm collaboration, monitor resp course, slowly improving due to end stage copd

## 2020-10-11 NOTE — PROGRESS NOTE ADULT - ASSESSMENT
AURORA on CKD 2  Hyponatremia  Hypokalemia  COPD  Hypercalcemia     -BLCr 1  -AURORA unclear etiology, clinically ATN  -UA - 3-5 WBC, trace ketones, 30 proteins  -FeUrea 34.6%  -UPC 0.44  -Ur osm 454  -Corrected calcium 10.2, with paraprotein gap and anemia; FLC ratio is normal; SPEP is pending  -Iron repletion   -Metformin on hold  -Renal indices are back to baseline; Can consider resuming bumex as needed  -Less likely AIN given paucity of WBC on UA and no peripheral eosinophils  -Did not receive albumin as patient was anxious about it.   -No IVF needed at present  -Will hold off on Albumin for today  -Strict I&Os

## 2020-10-11 NOTE — PROGRESS NOTE ADULT - SUBJECTIVE AND OBJECTIVE BOX
PULMONARY/CRITICAL CARE  Somewhat less congested. No fever.    Wore cpap hs.   Asked to take over pulmonary care.     Patient is a 60y old  Female who presents with a chief complaint of COPD exacerbation, PNA (06 Oct 2020 14:10)    BRIEF HOSPITAL COURSE: ***59y/o woman with end stage COPD on 3L O2 at home awaiting transplant at [facility], former smoker, CAD s/p stent, afib, DM2), raynaud phenomenon and recent hospitalization at [facility] for confusion and [facility] with worsening of SOB. He is very short of breath at rest, worst with any movement, unable to complete her sentences. No fever. Has some cough but no sputum at this time.   CXR done showing b/l patchy infiltrates,   CT chest done showing multifocal PNA and reactive mediastinal lymphadenopathy  She has been started on azithromycin and ceftriaxone and ID was called for further recommendation.   Pt. improved today.  Uses CPAP hs and prn.  No sputum.    Events last 24 hours: ***    PAST MEDICAL & SURGICAL HISTORY:  H/O Raynaud&#x27;s syndrome    Ascorbic acid deficiency    Iron deficiency anemia    Constipation    GERD without esophagitis    Type 2 diabetes mellitus    HTN (hypertension)    Heart failure    HLD (hyperlipidemia)    History of chronic atrial fibrillation  on Eliquis    End stage COPD  on 3LNC    History of tonsillectomy      Allergies    No Known Allergies    Intolerances      FAMILY HISTORY:  FH: myocardial infarction    Family history of CABG            Medications:  azithromycin  IVPB 500 milliGRAM(s) IV Intermittent every 24 hours  piperacillin/tazobactam IVPB.. 3.375 Gram(s) IV Intermittent every 8 hours    buMETAnide 1 milliGRAM(s) Oral two times a day  diltiazem    milliGRAM(s) Oral daily    albuterol/ipratropium for Nebulization 3 milliLiter(s) Nebulizer every 3 hours PRN  budesonide 160 MICROgram(s)/formoterol 4.5 MICROgram(s) Inhaler 2 Puff(s) Inhalation two times a day  guaiFENesin   Syrup  (Sugar-Free) 100 milliGRAM(s) Oral every 6 hours PRN  montelukast 10 milliGRAM(s) Oral at bedtime  theophylline ER Capsule 300 milliGRAM(s) Oral <User Schedule>  tiotropium 18 MICROgram(s) Capsule 1 Capsule(s) Inhalation daily    acetaminophen   Tablet .. 650 milliGRAM(s) Oral every 6 hours PRN  oxyCODONE    IR 5 milliGRAM(s) Oral every 6 hours PRN      apixaban 5 milliGRAM(s) Oral every 12 hours  aspirin  chewable 81 milliGRAM(s) Oral daily    bisacodyl Suppository 10 milliGRAM(s) Rectal daily PRN  lactulose Syrup 15 Gram(s) Oral two times a day PRN  pantoprazole    Tablet 40 milliGRAM(s) Oral before breakfast  polyethylene glycol 3350 17 Gram(s) Oral daily  senna 2 Tablet(s) Oral at bedtime      atorvastatin 80 milliGRAM(s) Oral at bedtime  dextrose 40% Gel 15 Gram(s) Oral once PRN  dextrose 50% Injectable 12.5 Gram(s) IV Push once  dextrose 50% Injectable 25 Gram(s) IV Push once  dextrose 50% Injectable 25 Gram(s) IV Push once  glucagon  Injectable 1 milliGRAM(s) IntraMuscular once PRN  insulin glargine Injectable (LANTUS) 10 Unit(s) SubCutaneous at bedtime  insulin lispro (HumaLOG) corrective regimen sliding scale   SubCutaneous three times a day before meals  methylPREDNISolone sodium succinate Injectable 40 milliGRAM(s) IV Push daily    ascorbic acid 500 milliGRAM(s) Oral daily  cholecalciferol 1000 Unit(s) Oral daily  dextrose 5%. 1000 milliLiter(s) IV Continuous <Continuous>  ferrous    sulfate 325 milliGRAM(s) Oral daily  magnesium sulfate  IVPB 1 Gram(s) IV Intermittent once  multivitamin 1 Tablet(s) Oral daily                ICU Vital Signs Last 24 Hrs  T(C): 36.4 (07 Oct 2020 06:29), Max: 36.7 (06 Oct 2020 09:27)  T(F): 97.5 (07 Oct 2020 06:29), Max: 98.1 (06 Oct 2020 12:34)  HR: 84 (07 Oct 2020 08:09) (54 - 100)  BP: 110/68 (07 Oct 2020 06:29) (110/68 - 149/68)  BP(mean): --  ABP: --  ABP(mean): --  RR: 18 (07 Oct 2020 06:29) (18 - 22)  SpO2: 97% (07 Oct 2020 08:09) (90% - 98%)    Vital Signs Last 24 Hrs  T(C): 36.4 (07 Oct 2020 06:29), Max: 36.7 (06 Oct 2020 09:27)  T(F): 97.5 (07 Oct 2020 06:29), Max: 98.1 (06 Oct 2020 12:34)  HR: 84 (07 Oct 2020 08:09) (54 - 100)  BP: 110/68 (07 Oct 2020 06:29) (110/68 - 149/68)  BP(mean): --  RR: 18 (07 Oct 2020 06:29) (18 - 22)  SpO2: 97% (07 Oct 2020 08:09) (90% - 98%)        I&O's Detail        LABS:                        10.3   8.87  )-----------( 513      ( 07 Oct 2020 06:55 )             33.0     10-07    131<L>  |  86<L>  |  32<H>  ----------------------------<  230<H>  3.6   |  36<H>  |  1.40<H>    Ca    9.6      07 Oct 2020 06:55  Phos  4.7     10-07  Mg     1.5     10-07    TPro  7.8  /  Alb  2.6<L>  /  TBili  0.3  /  DBili  x   /  AST  19  /  ALT  13  /  AlkPhos  88  10-07          CAPILLARY BLOOD GLUCOSE      POCT Blood Glucose.: 269 mg/dL (07 Oct 2020 08:18)        CULTURES:      Physical Examination:    General: mild sob.    HEENT: Pupils equal, reactive to light.  Symmetric.    PULM: decreased bs few rhonchi, no change percussion no rales, wheeze    CVS: Regular rate and rhythm, no murmurs, rubs, or gallops    ABD: Soft, nondistended, nontender, normoactive bowel sounds, no masses    EXT: No edema, nontender    SKIN: Warm and well perfused, no rashes noted.    NEURO: Alert, oriented, interactive, nonfocal    RADIOLOGY: ***d< from: CT Chest No Cont (10.06.20 @ 04:52) >  EXAM:  CT CHEST                            PROCEDURE DATE:  10/06/2020          INTERPRETATION:  CLINICAL INFORMATION: Rule out pneumonia. Chronic obstructive pulmonary disease.    COMPARISON: Same day radiograph    PROCEDURE:  CT of the Chest was performed without intravenous contrast.  Sagittal and coronal reformats were performed. 3-D MIPS images were acquired on the axial plane.    FINDINGS:    LUNGS AND AIRWAYS: Patent central airways.  Centrilobular emphysema in both lungs. Numerous nodular densities with septal thickening showing tree-in-bud appearance in the right upper lobe, right middle lobe, left upper lobe and left lower lobe. There is no atelectasis.  PLEURA: No pleural effusion.  MEDIASTINUM AND LILIANA: 1.4 cm sized right paratracheal lymph node. A few subcentimeter mediastinal lymph nodes.  VESSELS: Moderate atherosclerotic calcification.  HEART: Heart size is normal. No pericardial effusion.  CHEST WALL AND LOWER NECK: Within normal limits.  VISUALIZED UPPER ABDOMEN: Within normal limits.  BONES: Degenerative change.    IMPRESSION:  Multifocal pneumonia involving both lungs as described.  Reactive mediastinal lymphadenopathy.              ROBBIE LEAL M.D., ATTENDING RADIOLOGIST  This document has been electronically signed. Oct  6 2020  5:12AM    < end of copied text >      < from: Xray Chest 1 View- PORTABLE-Routine (Xray Chest 1 View- PORTABLE-Routine in AM.) (10.08.20 @ 09:55) >  EXAM:  XR CHEST PORTABLE ROUTINE 1V                            PROCEDURE DATE:  10/08/2020          INTERPRETATION:  Chest portable.    Clinical History: Shortness of breath. Pneumonia.    Comparison: 10/6/2020.    Single AP view submitted.    The evaluation of the cardiomediastinal silhouette is limited on portable technique.    Paucity of lung markings in the upper lung zones is compatible with emphysematous disease.    Prominent reticulonodular opacities right mid and lower lung zone  There is probable bronchial wall thickening at the bilateral lower lung zones.  No lobar lung consolidation is noted.    Impression:    Findings as discussed above.  < from: Xray Chest 1 View-PORTABLE IMMEDIATE (Xray Chest 1 View-PORTABLE IMMEDIATE .) (10.10.20 @ 09:54) >  EXAM:  XR CHEST PORTABLE IMMED 1V                            PROCEDURE DATE:  10/10/2020          INTERPRETATION:  INDICATION: Pneumonia; follow-up assessment.    PRIORS: 10/8/2020.    VIEWS: Portable AP radiography of the chest performed.    FINDINGS: Heart size appears within normal limits. No hilar or superior mediastinal abnormalities are identified. Infiltrates within the bilateral mid to lower lung fields are without significant change. No pleural effusion or pneumothorax is demonstrated.No mediastinal shift is noted. The visualized osseous structures appear unremarkable.    IMPRESSION: No significant interval change.              GUILLE BOBBY M.D., ATTENDING RADIOLOGIST  This document has been electronically signed. Oct 10 2020  9:57AM    < end of copied text >    < end of copied text >

## 2020-10-11 NOTE — PROGRESS NOTE ADULT - ASSESSMENT
Pt. with endstage COPD on transplant list, with acute pneumonia.  Improved clincally.    Continue antibiotics  Trial atrovent hhn which helped.   Inhalers.  PT.

## 2020-10-11 NOTE — PROGRESS NOTE ADULT - PROBLEM SELECTOR PLAN 3
Improving ; unclear etiology - possibly 2/2 to prerenal vs hypoxemic  - nephro (Dr. Mullen) consulted, recs appreciated - Check UA, Ur lytes, UPC, Ur osm, Corrected calcium 10.2, with paraprotein gap and anemia,  check FLC and SPEP  -Iron 38; unsat ; TIBC 236; % sat 16   - Renal US: no hydronephrosis   - bumetanide held 2/2 to ATI  - trend renal indices  - avoid nephrotoxic meds   - renally dose meds.  - Did not receive albumen as patient was anxious about it

## 2020-10-11 NOTE — PROGRESS NOTE ADULT - ASSESSMENT
60F w/ end stage COPD on 3LNC (pending transplant list at [facility]), former smoker, CAD s/p stent (2016), Afib on Eliquis,  uncontrolled DM2 (on insulin), raynaud phenomenon and recent hospitalization at [facility] for confusion and AURORA p/w sob, admitted for COPD exacerbation and multifocal PNA    CAD s/p stent   - Continue asa, statin  - Denies ischemic symptoms   - EKG showed SR @ 109, RBBB    Paroxysmal Afib  - Tele showed SR 70-80s   - Continue Eliquis 5mg  - Continue Cardizem   - TTE w/ mild concentric LVH grossly nL LVSF ef 55%, mild MR    COPD/Pneumonia  - CT chest noted  - per pulm/ID  - Continue steroids and antibiotics     VTE ppx  - Continue apixaban      - Monitor and replete lytes, keep K>4, Mg>2.  - All other medical needs as per primary team.  - Other cardiovascular workup will depend on clinical course.  - Will continue to follow.    Lisa Soto, MS FNP, AGACNP  Nurse Practitioner- Cardiology   Spectra #[phone]

## 2020-10-11 NOTE — PROGRESS NOTE ADULT - PROBLEM SELECTOR PLAN 1
likely multifactorial from multifocal PNA and COPD exacerbation  - CXR: b/l lung infiltrates; emphysema   - CT chest done showing multifocal PNA  - continue PO solumedrol 40 mg daily   - on spiriva, symbicort, montelukast,  inhaler PRN   - ID consulted (Dr. Zhu) recs appreciated, - Blood culture NGTD; on ceftriaxone 1gm daily   - currently on 5 L NC with BIPAP maintain O2 >/ 88, will slowly titrate down to 4 L as tolerated with goal of 3 L which pt requires at home. goal sat >88 percent  - legionella neg  -sputum cx: few gram positive cocci   - TTE (10/6/20) LVEF of 55% - no vegetations appreciated   - pulm Dr. Rojas consulted, recs appreciated: trial atrovent hhn helped, add mucinex  - apprec palliative care support in management due to complexity of care and nature of history

## 2020-10-11 NOTE — PROGRESS NOTE ADULT - PROBLEM SELECTOR PLAN 2
on 3LNC at rehab, currently requiring 5L nc with BIPAP, wean as tolerated  -on spiriva, symbicort, montelukast,  inhaler PRN at rehab, continue  -theophylline held given tachycardia and brief afib  - BIPAP qhs and PRN  -of note pt is not on transplant list yet, is planning to go on list at [facility] if medically stable and improved.

## 2020-10-11 NOTE — PROGRESS NOTE ADULT - SUBJECTIVE AND OBJECTIVE BOX
Patient is a 60y old  Female who presents with a chief complaint of COPD exacerbation, PNA (11 Oct 2020 09:44)      INTERVAL HPI/OVERNIGHT EVENTS: No acute overnight events. Pt seen and examined at bedside. Says she still feels short of breath, worse with exertion, but slightly improved from yesterday. She says her BM's are regular. She was upset that she was woken up in the middle of the night for her BIPAP machine. She has no further complaints, denies fevers, chills, chest pain, abdominal pain, N/V/D/C. Currently on 5 L O2 saturating well.     MEDICATIONS  (STANDING):  apixaban 5 milliGRAM(s) Oral every 12 hours  aspirin  chewable 81 milliGRAM(s) Oral daily  atorvastatin 80 milliGRAM(s) Oral at bedtime  budesonide 160 MICROgram(s)/formoterol 4.5 MICROgram(s) Inhaler 2 Puff(s) Inhalation two times a day  cefTRIAXone   IVPB 1000 milliGRAM(s) IV Intermittent every 24 hours  cholecalciferol 1000 Unit(s) Oral daily  dextrose 5%. 1000 milliLiter(s) (50 mL/Hr) IV Continuous <Continuous>  dextrose 50% Injectable 12.5 Gram(s) IV Push once  dextrose 50% Injectable 25 Gram(s) IV Push once  dextrose 50% Injectable 25 Gram(s) IV Push once  diltiazem    milliGRAM(s) Oral daily  ferrous    sulfate 325 milliGRAM(s) Oral daily  guaiFENesin  milliGRAM(s) Oral every 12 hours  insulin glargine Injectable (LANTUS) 10 Unit(s) SubCutaneous at bedtime  insulin lispro (HumaLOG) corrective regimen sliding scale   SubCutaneous three times a day before meals  insulin lispro (HumaLOG) corrective regimen sliding scale   SubCutaneous at bedtime  insulin lispro Injectable (HumaLOG) 3 Unit(s) SubCutaneous three times a day before meals  ipratropium    for Nebulization 500 MICROGram(s) Nebulizer every 6 hours  montelukast 10 milliGRAM(s) Oral at bedtime  multivitamin 1 Tablet(s) Oral daily  pantoprazole    Tablet 40 milliGRAM(s) Oral before breakfast  polyethylene glycol 3350 17 Gram(s) Oral daily  predniSONE   Tablet 40 milliGRAM(s) Oral daily  senna 2 Tablet(s) Oral at bedtime  tiotropium 18 MICROgram(s) Capsule 1 Capsule(s) Inhalation daily    MEDICATIONS  (PRN):  acetaminophen   Tablet .. 650 milliGRAM(s) Oral every 6 hours PRN Mild Pain (1 - 3)  bisacodyl Suppository 10 milliGRAM(s) Rectal daily PRN Constipation  dextrose 40% Gel 15 Gram(s) Oral once PRN Blood Glucose LESS THAN 70 milliGRAM(s)/deciliter  glucagon  Injectable 1 milliGRAM(s) IntraMuscular once PRN Glucose LESS THAN 70 milligrams/deciliter  lactulose Syrup 15 Gram(s) Oral two times a day PRN constipation  oxyCODONE    IR 5 milliGRAM(s) Oral every 6 hours PRN Moderate Pain (4 - 6)      Allergies    No Known Allergies    Intolerances    albuterol (Unknown)      REVIEW OF SYSTEMS:  CONSTITUTIONAL: No fever or chills  HEENT:  No headache, no sore throat  RESPIRATORY:Shortness of breath, no coughing or wheezing   CARDIOVASCULAR: No chest pain, palpitations  GASTROINTESTINAL: No abd pain, nausea, vomiting, or diarrhea  GENITOURINARY: No dysuria, frequency, or hematuria  NEUROLOGICAL: no focal weakness or dizziness  MUSCULOSKELETAL: no myalgias     Vital Signs Last 24 Hrs  T(C): 36.6 (11 Oct 2020 06:09), Max: 36.6 (10 Oct 2020 19:43)  T(F): 97.8 (11 Oct 2020 06:09), Max: 97.9 (10 Oct 2020 19:43)  HR: 86 (11 Oct 2020 06:09) (86 - 93)  BP: 130/77 (11 Oct 2020 06:09) (120/79 - 144/81)  BP(mean): --  RR: 18 (11 Oct 2020 06:09) (18 - 18)  SpO2: 100% (11 Oct 2020 06:09) (97% - 100%)    PHYSICAL EXAM:  GENERAL: NAD, laying in bed with NC (5 L)  HEENT:  anicteric, EOMI b/l  CHEST/LUNG:  decreased breath sounds, no rales, wheezes, or rhonchi  HEART:  RRR, S1, S2  ABDOMEN:  BS+, soft, nontender, nondistended  EXTREMITIES: no edema, cyanosis, or calf tenderness  NERVOUS SYSTEM: answers questions and follows commands appropriately    LABS:                        8.9    10.88 )-----------( 483      ( 11 Oct 2020 07:04 )             30.4     CBC Full  -  ( 11 Oct 2020 07:04 )  WBC Count : 10.88 K/uL  Hemoglobin : 8.9 g/dL  Hematocrit : 30.4 %  Platelet Count - Automated : 483 K/uL  Mean Cell Volume : 81.7 fl  Mean Cell Hemoglobin : 23.9 pg  Mean Cell Hemoglobin Concentration : 29.3 gm/dL  Auto Neutrophil # : 7.89 K/uL  Auto Lymphocyte # : 1.25 K/uL  Auto Monocyte # : 1.04 K/uL  Auto Eosinophil # : 0.31 K/uL  Auto Basophil # : 0.03 K/uL  Auto Neutrophil % : 72.5 %  Auto Lymphocyte % : 11.5 %  Auto Monocyte % : 9.6 %  Auto Eosinophil % : 2.8 %  Auto Basophil % : 0.3 %    11 Oct 2020 07:04    139    |  99     |  36     ----------------------------<  317    4.4     |  34     |  0.94     Ca    9.2        11 Oct 2020 07:04    TPro  6.3    /  Alb  2.4    /  TBili  0.2    /  DBili  x      /  AST  12     /  ALT  16     /  AlkPhos  70     11 Oct 2020 07:04        CAPILLARY BLOOD GLUCOSE      POCT Blood Glucose.: 273 mg/dL (11 Oct 2020 07:49)  POCT Blood Glucose.: 397 mg/dL (10 Oct 2020 21:09)  POCT Blood Glucose.: 138 mg/dL (10 Oct 2020 16:58)  POCT Blood Glucose.: 202 mg/dL (10 Oct 2020 12:05)        Culture - Sputum (collected 10-09-20 @ 06:28)  Source: .Sputum Sputum  Gram Stain (10-09-20 @ 13:47):    No polymorphonuclear leukocytes per low power field    Few Squamous epithelial cells per low power field    Few Gram Positive Cocci in Clusters per oil power field  Final Report (10-11-20 @ 08:47):    Normal Respiratory Samaria present    Culture - Blood (collected 10-06-20 @ 09:23)  Source: .Blood Blood  Final Report (10-11-20 @ 10:00):    No Growth Final    Culture - Blood (collected 10-06-20 @ 09:23)  Source: .Blood Blood  Final Report (10-11-20 @ 10:00):    No Growth Final        RADIOLOGY & ADDITIONAL TESTS:    Personally reviewed.     Consultant(s) Notes Reviewed:  [x] YES  [ ] NO

## 2020-10-11 NOTE — PROGRESS NOTE ADULT - ASSESSMENT
59y/o woman with end stage COPD on 3L O2 at home awaiting transplant at [facility], former smoker, CAD s/p stent, afib, DM2), raynaud phenomenon and recent hospitalization at [facility] for confusion and AURORA has been sent from [facility] with worsening of SOB. CT with multilobar opacities. Due to recent hospitalization and rehab stay, will cover for possible HCAP.   Very high risk with a poor lung capacities. Overall stable.     Suggestions--  Continue ceftriaxone  Continue azithromycin     to resume care in am.    Discussed with Dr. Ruiz  D/W patient all questions answered to the best of my ability.    Miles Osborn MD  Attending Physician  [facility]  Division of Infectious Diseases  [phone]

## 2020-10-11 NOTE — PROGRESS NOTE ADULT - ASSESSMENT
60F w/ end stage COPD on 3LNC (trying to get on  transplant list at [facility]), former smoker, CAD s/p stent (2016), afib on eliquis, uncontrolled DM2 (on insulin), raynaud phenomenon and recent hospitalization at [facility] for confusion and AURORA p/w sob, admitted for acute on chronic resp failure with hypoxia and hyercarbia sec to multifocal PNA and COPD exacerabtion with end stage COPD.

## 2020-10-11 NOTE — PROGRESS NOTE ADULT - SUBJECTIVE AND OBJECTIVE BOX
[facility] -- Adriana Gonzales Grossman, [place], Dylan Whitley Sav[place], Goodger: Office # [phone]    Follow Up:  SOB    Subjective/Observations: Patient seen and examined. Patient awake, alert, resting in bed. Patient continues to be SOB with any activity and is unable to even walk short distance. Aggravated that she slept poor overnight. No chest pain, No dizziness or lightheadedness.    REVIEW OF SYSTEMS: All review of systems is negative for eye, ENT, GI, , allergic, dermatologic, musculoskeletal and neurologic except as described above    PAST MEDICAL & SURGICAL HISTORY:  Ascorbic acid deficiency  Iron deficiency anemia  Constipation  GERD without esophagitis  Type 2 diabetes mellitus  HTN (hypertension)  Heart failure  End stage COPD on 3LNC  Atrial fibrillation  Hyperlipemia  Chronic sinusitis  Raynaud phenomenon  Claustrophobia  COPD (chronic obstructive pulmonary disease)  History of tonsillectomy  S/P tonsillectomy    MEDICATIONS  (STANDING):  apixaban 5 milliGRAM(s) Oral every 12 hours  aspirin  chewable 81 milliGRAM(s) Oral daily  atorvastatin 80 milliGRAM(s) Oral at bedtime  budesonide 160 MICROgram(s)/formoterol 4.5 MICROgram(s) Inhaler 2 Puff(s) Inhalation two times a day  cefTRIAXone   IVPB 1000 milliGRAM(s) IV Intermittent every 24 hours  cholecalciferol 1000 Unit(s) Oral daily  dextrose 5%. 1000 milliLiter(s) (50 mL/Hr) IV Continuous <Continuous>  dextrose 50% Injectable 12.5 Gram(s) IV Push once  dextrose 50% Injectable 25 Gram(s) IV Push once  dextrose 50% Injectable 25 Gram(s) IV Push once  diltiazem    milliGRAM(s) Oral daily  ferrous    sulfate 325 milliGRAM(s) Oral daily  guaiFENesin  milliGRAM(s) Oral every 12 hours  insulin glargine Injectable (LANTUS) 10 Unit(s) SubCutaneous at bedtime  insulin lispro (HumaLOG) corrective regimen sliding scale   SubCutaneous three times a day before meals  insulin lispro (HumaLOG) corrective regimen sliding scale   SubCutaneous at bedtime  insulin lispro Injectable (HumaLOG) 3 Unit(s) SubCutaneous three times a day before meals  ipratropium    for Nebulization 500 MICROGram(s) Nebulizer every 6 hours  montelukast 10 milliGRAM(s) Oral at bedtime  multivitamin 1 Tablet(s) Oral daily  pantoprazole    Tablet 40 milliGRAM(s) Oral before breakfast  polyethylene glycol 3350 17 Gram(s) Oral daily  predniSONE   Tablet 40 milliGRAM(s) Oral daily  senna 2 Tablet(s) Oral at bedtime  tiotropium 18 MICROgram(s) Capsule 1 Capsule(s) Inhalation daily    MEDICATIONS  (PRN):  acetaminophen   Tablet .. 650 milliGRAM(s) Oral every 6 hours PRN Mild Pain (1 - 3)  bisacodyl Suppository 10 milliGRAM(s) Rectal daily PRN Constipation  dextrose 40% Gel 15 Gram(s) Oral once PRN Blood Glucose LESS THAN 70 milliGRAM(s)/deciliter  glucagon  Injectable 1 milliGRAM(s) IntraMuscular once PRN Glucose LESS THAN 70 milligrams/deciliter  lactulose Syrup 15 Gram(s) Oral two times a day PRN constipation  oxyCODONE    IR 5 milliGRAM(s) Oral every 6 hours PRN Moderate Pain (4 - 6)    Allergies  No Known Allergies    Intolerances  albuterol (Unknown)    Vital Signs Last 24 Hrs  T(C): 36.6 (11 Oct 2020 06:09), Max: 36.6 (10 Oct 2020 19:43)  T(F): 97.8 (11 Oct 2020 06:09), Max: 97.9 (10 Oct 2020 19:43)  HR: 86 (11 Oct 2020 06:09) (86 - 93)  BP: 130/77 (11 Oct 2020 06:09) (120/79 - 144/81)  BP(mean): --  RR: 18 (11 Oct 2020 06:09) (18 - 18)  SpO2: 100% (11 Oct 2020 06:09) (97% - 100%)    I&O's Summary    10 Oct 2020 07:01  -  11 Oct 2020 07:00  --------------------------------------------------------  IN: 100 mL / OUT: 1500 mL / NET: -1400 mL      TELE:  70-80s   PHYSICAL EXAM:  Appearance: NAD, no distress, alert, Frail   HEENT: Moist Mucous Membranes, Anicteric  Cardiovascular: Regular rate and rhythm, Normal S1 S2, No JVD, No murmurs, No rubs, gallops or clicks  Respiratory: Non-labored, Clear to auscultation, Diminished  No rales, No rhonchi, No wheezing.   Gastrointestinal:  Soft, Non-tender, + BS  Neurologic: Non-focal  Skin: Warm and dry, No visible rashes or ulcers, No ecchymosis, No cyanosis  Musculoskeletal: No clubbing, No cyanosis, No joint swelling/tenderness  Psychiatry: Mood & affect appropriate  Lymph: No peripheral edema.     LABS: All Labs Reviewed:                        8.9    10.88 )-----------( 483      ( 11 Oct 2020 07:04 )             30.4                         9.5    11.84 )-----------( 465      ( 10 Oct 2020 06:48 )             32.1                         9.6    12.81 )-----------( 504      ( 09 Oct 2020 07:32 )             31.9     11 Oct 2020 07:04    139    |  99     |  36     ----------------------------<  317    4.4     |  34     |  0.94   10 Oct 2020 06:48    138    |  97     |  43     ----------------------------<  203    4.8     |  38     |  1.30   09 Oct 2020 07:32    137    |  97     |  53     ----------------------------<  239    5.0     |  37     |  1.90     Ca    9.2        11 Oct 2020 07:04  Ca    9.4        10 Oct 2020 06:48  Ca    9.2        09 Oct 2020 07:32  Phos  2.9       10 Oct 2020 06:48  Phos  2.9       09 Oct 2020 07:32  Mg     2.4       10 Oct 2020 06:48  Mg     2.1       09 Oct 2020 07:32  Mg     2.0       08 Oct 2020 09:17    TPro  6.3    /  Alb  2.4    /  TBili  0.2    /  DBili  x      /  AST  12     /  ALT  16     /  AlkPhos  70     11 Oct 2020 07:04  TPro  6.8    /  Alb  2.5    /  TBili  0.2    /  DBili  x      /  AST  17     /  ALT  19     /  AlkPhos  76     10 Oct 2020 06:48  TPro  7.2    /  Alb  2.5    /  TBili  0.2    /  DBili  x      /  AST  21     /  ALT  21     /  AlkPhos  81     09 Oct 2020 07:32    12 Lead ECG:   Ventricular Rate 109 BPM  Atrial Rate 109 BPM  P-R Interval 150 ms  QRS Duration 136 ms  Q-T Interval 390 ms  QTC Calculation(Bazett) 525 ms  P Axis 56 degrees  R Axis -16 degrees  T Axis 57 degrees  Diagnosis Line Sinus tachycardia with premature supraventricular complexes  Right bundle branch block  Abnormal ECG  Confirmed by LUIS ENRIQUE WHITLEY ([id number]) on 10/8/2020 11:41:04 AM (10-08-20 @ 08:51)    < from: TTE Echo Complete w/o Contrast w/ Doppler (10.06.20 @ 17:10) >  Dimensions:  LA 3.7       Normal Values: 2.0 - 4.0 cm  Ao 2.7        Normal Values: 2.0 - 3.8 cm  SEPTUM 1.4       Normal Values: 0.6 - 1.2 cm  PWT 1.3       Normal Values: 0.6 - 1.1 cm  LVIDd 3.9         Normal Values: 3.0 - 5.6 cm  LVIDs 2.8         Normal Values: 1.8 - 4.0 cm    OBSERVATIONS:  Technically difficult and limited study  Mitral Valve: Thickened leaflets, mild MR.  Aortic Valve/Aorta: Aortic valve is not well-visualized, grossly normal function without severe pathology  Tricuspid Valve: normal with trace TR.  Pulmonic Valve: Not well-visualized  Left Atrium: normal  Right Atrium: Not well-visualized  Left Ventricle: normal LV size and systolic function, estimated LVEF of 55%. Mild LVH. Endocardium is not well-visualized, cannot rule out segmental dysfunction  Right Ventricle: Grossly normal size and systolic function.  Pericardium/Pleura: normal, no significant pericardial effusion.  Pulmonary/RV Pressure: estimated PA systolic pressure of 15.7 mmHg assuming an RA pressure of 3 mmHg.  LV diastolic dysfunction is present    Conclusion:  Technically difficult and limited study  Mild concentric LVH with grossly normal left ventricular systolic function, estimated LVEF of 55%.  Grossly normal RV size and systolic function.  Aortic valve is not well-visualized, grossly normal function without severe pathology  Mild MR  Trace TR.  No significant pericardial effusion.    < end of copied text >    < from: Cardiac Cath Lab - Adult (03.24.17 @ 11:16) >  VENTRICLES: Global left ventricular function was normal. EF estimated was  65 %.  CORONARY VESSELS: The coronary circulation is right dominant.  LM:   --  Mid left main: There was a 30 % stenosis.  LAD:   --  Ostial LAD: There was a 40 % stenosis.  CX:   --  Circumflex: Normal.  RCA:   --  RCA: Normal.  COMPLICATIONS: There were no complications.  DIAGNOSTIC RECOMMENDATIONS: The patient should continue with the present  medications.  < end of copied text >

## 2020-10-11 NOTE — PROGRESS NOTE ADULT - SUBJECTIVE AND OBJECTIVE BOX
[facility]  Division of Infectious Diseases  [phone]    Name: CHERI HARTMAN  Age: 60y  Gender: Female  MRN: [id number]    Interval History--  Notes reviewed    Past Medical History--  H/O Raynaud&#x27;s syndrome    Ascorbic acid deficiency    Iron deficiency anemia    Constipation    GERD without esophagitis    Type 2 diabetes mellitus    HTN (hypertension)    Heart failure    HLD (hyperlipidemia)    History of chronic atrial fibrillation    End stage COPD    Advanced COPD    Atrial fibrillation    Hyperlipemia    Chronic sinusitis    Raynaud phenomenon    HTN (hypertension)    DM (diabetes mellitus)    Claustrophobia    COPD (chronic obstructive pulmonary disease)    History of tonsillectomy    S/P tonsillectomy        For details regarding the patient's social history, family history, and other miscellaneous elements, please refer the initial infectious diseases consultation and/or the admitting history and physical examination for this admission.    Allergies    No Known Allergies    Intolerances    albuterol (Unknown)      Medications--  Antibiotics:  azithromycin   Tablet 500 milliGRAM(s) Oral daily  cefTRIAXone   IVPB 1000 milliGRAM(s) IV Intermittent every 24 hours    Immunologic:    Other:  acetaminophen   Tablet .. PRN  apixaban  aspirin  chewable  atorvastatin  bisacodyl Suppository PRN  budesonide 160 MICROgram(s)/formoterol 4.5 MICROgram(s) Inhaler  cholecalciferol  dextrose 40% Gel PRN  dextrose 5%.  dextrose 50% Injectable  dextrose 50% Injectable  dextrose 50% Injectable  diltiazem   CD  ferrous    sulfate  glucagon  Injectable PRN  guaiFENesin ER  insulin glargine Injectable (LANTUS)  insulin lispro (HumaLOG) corrective regimen sliding scale  insulin lispro (HumaLOG) corrective regimen sliding scale  insulin lispro Injectable (HumaLOG)  ipratropium    for Nebulization  lactulose Syrup PRN  montelukast  multivitamin  oxyCODONE    IR PRN  pantoprazole    Tablet  polyethylene glycol 3350  predniSONE   Tablet  senna  tiotropium 18 MICROgram(s) Capsule      Review of Systems--  A 10-point review of systems was obtained.     Pertinent positives and negatives--  Constitutional: No fevers. No Chills. No Rigors.   Cardiovascular: No chest pain. No palpitations.  Respiratory: No shortness of breath. No cough.  Gastrointestinal: No nausea or vomiting. No diarrhea or constipation.   Psychiatric: Pleasant. Appropriate affect.    Review of systems otherwise negative except as previously noted.    Physical Examination--  Vital Signs: T(F): 97.8 (10-11-20 @ 06:09), Max: 97.9 (10-10-20 @ 19:43)  HR: 86 (10-11-20 @ 06:09)  BP: 130/77 (10-11-20 @ 06:09)  RR: 18 (10-11-20 @ 06:09)  SpO2: 100% (10-11-20 @ 06:09)  Wt(kg): --  General: Nontoxic-appearing Female in no acute distress.  HEENT: AT/NC. PERRL. EOMI. Anicteric. Conjunctiva pink and moist. Oropharynx clear. Dentition fair.  Neck: Not rigid. No sense of mass.  Nodes: None palpable.  Lungs: Clear bilaterally without rales, wheezing or rhonchi  Heart: Regular rate and rhythm. No Murmur. No rub. No gallop. No palpable thrill.  Abdomen: Bowel sounds present and normoactive. Soft. Nondistended. Nontender. No sense of mass. No organomegaly.  Back: No spinal tenderness. No costovertebral angle tenderness.   Extremities: No cyanosis or clubbing. No edema.   Skin: Warm. Dry. Good turgor. No rash. No vasculitic stigmata.  Psychiatric: Appropriate affect and mood for situation.         Laboratory Studies--  CBC                        8.9    10.88 )-----------( 483      ( 11 Oct 2020 07:04 )             30.4       Chemistries  10-11    139  |  99  |  36<H>  ----------------------------<  317<H>  4.4   |  34<H>  |  0.94    Ca    9.2      11 Oct 2020 07:04  Phos  2.9     10-10  Mg     2.4     10-10    TPro  6.3  /  Alb  2.4<L>  /  TBili  0.2  /  DBili  x   /  AST  12<L>  /  ALT  16  /  AlkPhos  70  10-11      Culture Data    Culture - Sputum (collected 09 Oct 2020 06:28)  Source: .Sputum Sputum  Gram Stain (09 Oct 2020 13:47):    No polymorphonuclear leukocytes per low power field    Few Squamous epithelial cells per low power field    Few Gram Positive Cocci in Clusters per oil power field  Final Report (11 Oct 2020 08:47):    Normal Respiratory Samaria present    Culture - Blood (collected 06 Oct 2020 09:23)  Source: .Blood Blood  Final Report (11 Oct 2020 10:00):    No Growth Final    Culture - Blood (collected 06 Oct 2020 09:23)  Source: .Blood Blood  Final Report (11 Oct 2020 10:00):    No Growth Final             [facility]  Division of Infectious Diseases  [phone]    Covering Dr. Wagner Juarez.     Name: CHERI HARTMAN  Age: 60y  Gender: Female  MRN: [id number]    Interval History--  Notes reviewed. Feels ok. No real change over past 48h. Denies fevers, chills, or rigors. No CP.    Past Medical History--  H/O Raynaud&#x27;s syndrome    Ascorbic acid deficiency    Iron deficiency anemia    Constipation    GERD without esophagitis    Type 2 diabetes mellitus    HTN (hypertension)    Heart failure    HLD (hyperlipidemia)    History of chronic atrial fibrillation    End stage COPD    Advanced COPD    Atrial fibrillation    Hyperlipemia    Chronic sinusitis    Raynaud phenomenon    HTN (hypertension)    DM (diabetes mellitus)    Claustrophobia    COPD (chronic obstructive pulmonary disease)    History of tonsillectomy    S/P tonsillectomy        For details regarding the patient's social history, family history, and other miscellaneous elements, please refer the initial infectious diseases consultation and/or the admitting history and physical examination for this admission.    Allergies    No Known Allergies    Intolerances    albuterol (Unknown)      Medications--  Antibiotics:  azithromycin   Tablet 500 milliGRAM(s) Oral daily  cefTRIAXone   IVPB 1000 milliGRAM(s) IV Intermittent every 24 hours    Immunologic:    Other:  acetaminophen   Tablet .. PRN  apixaban  aspirin  chewable  atorvastatin  bisacodyl Suppository PRN  budesonide 160 MICROgram(s)/formoterol 4.5 MICROgram(s) Inhaler  cholecalciferol  dextrose 40% Gel PRN  dextrose 5%.  dextrose 50% Injectable  dextrose 50% Injectable  dextrose 50% Injectable  diltiazem   CD  ferrous    sulfate  glucagon  Injectable PRN  guaiFENesin ER  insulin glargine Injectable (LANTUS)  insulin lispro (HumaLOG) corrective regimen sliding scale  insulin lispro (HumaLOG) corrective regimen sliding scale  insulin lispro Injectable (HumaLOG)  ipratropium    for Nebulization  lactulose Syrup PRN  montelukast  multivitamin  oxyCODONE    IR PRN  pantoprazole    Tablet  polyethylene glycol 3350  predniSONE   Tablet  senna  tiotropium 18 MICROgram(s) Capsule      Review of Systems--  A 10-point review of systems was obtained.   Review of systems otherwise negative except as previously noted.    Physical Examination--  Vital Signs: T(F): 97.8 (10-11-20 @ 06:09), Max: 97.9 (10-10-20 @ 19:43)  HR: 86 (10-11-20 @ 06:09)  BP: 130/77 (10-11-20 @ 06:09)  RR: 18 (10-11-20 @ 06:09)  SpO2: 100% (10-11-20 @ 06:09)  Wt(kg): --  General: Nontoxic-appearing Female in no acute distress.  HEENT: AT/NC. Anicteric. Conjunctiva pink and moist. Oropharynx clear  Neck: Not rigid. No sense of mass.  Nodes: None palpable.  Lungs: Decreased breath sounds bilaterally with scant B rhonchi. No wheezing.  Heart: Regular rate and rhythm. No Murmur. No rub. No gallop. No palpable thrill.  Abdomen: Bowel sounds present and normoactive. Soft. Nondistended. Nontender. No sense of mass. No organomegaly.  Back: No spinal tenderness. No costovertebral angle tenderness.   Extremities: No cyanosis or clubbing. No edema.   Skin: Warm. Dry. Good turgor. No rash. No vasculitic stigmata.  Psychiatric: Appropriate affect and mood for situation.         Laboratory Studies--  CBC                        8.9    10.88 )-----------( 483      ( 11 Oct 2020 07:04 )             30.4     WBC trend  WBC Count: 10.88 K/uL (10-11-20 @ 07:04)  WBC Count: 11.84 K/uL (10-10-20 @ 06:48)  WBC Count: 12.81 K/uL (10-09-20 @ 07:32)  WBC Count: 12.92 K/uL (10-08-20 @ 09:17)  WBC Count: 8.87 K/uL (10-07-20 @ 06:55)      Chemistries  10-11    139  |  99  |  36<H>  ----------------------------<  317<H>  4.4   |  34<H>  |  0.94    Ca    9.2      11 Oct 2020 07:04  Phos  2.9     10-10  Mg     2.4     10-10    TPro  6.3  /  Alb  2.4<L>  /  TBili  0.2  /  DBili  x   /  AST  12<L>  /  ALT  16  /  AlkPhos  70  10-11      Culture Data    Culture - Sputum (collected 09 Oct 2020 06:28)  Source: .Sputum Sputum  Gram Stain (09 Oct 2020 13:47):    No polymorphonuclear leukocytes per low power field    Few Squamous epithelial cells per low power field    Few Gram Positive Cocci in Clusters per oil power field  Final Report (11 Oct 2020 08:47):    Normal Respiratory Samaria present    Culture - Blood (collected 06 Oct 2020 09:23)  Source: .Blood Blood  Final Report (11 Oct 2020 10:00):    No Growth Final    Culture - Blood (collected 06 Oct 2020 09:23)  Source: .Blood Blood  Final Report (11 Oct 2020 10:00):    No Growth Final    CT (personally reviewed) < from: CT Chest No Cont (10.06.20 @ 04:52) >    IMPRESSION:  Multifocal pneumonia involving both lungs as described.  Reactive mediastinal lymphadenopathy.      < end of copied text >

## 2020-10-12 DIAGNOSIS — E11.65 TYPE 2 DIABETES MELLITUS WITH HYPERGLYCEMIA: ICD-10-CM

## 2020-10-12 LAB
% ALBUMIN: 47.2 % — SIGNIFICANT CHANGE UP
% ALPHA 1: 9.3 % — SIGNIFICANT CHANGE UP
% ALPHA 2: 19.4 % — SIGNIFICANT CHANGE UP
% BETA: 12.2 % — SIGNIFICANT CHANGE UP
% GAMMA: 11.9 % — SIGNIFICANT CHANGE UP
% M SPIKE: 1.2 % — SIGNIFICANT CHANGE UP
ALBUMIN SERPL ELPH-MCNC: 2.3 G/DL — LOW (ref 3.3–5)
ALBUMIN SERPL ELPH-MCNC: 2.8 G/DL — LOW (ref 3.6–5.5)
ALBUMIN/GLOB SERPL ELPH: 0.9 RATIO — SIGNIFICANT CHANGE UP
ALP SERPL-CCNC: 65 U/L — SIGNIFICANT CHANGE UP (ref 40–120)
ALPHA1 GLOB SERPL ELPH-MCNC: 0.6 G/DL — HIGH (ref 0.1–0.4)
ALPHA2 GLOB SERPL ELPH-MCNC: 1.2 G/DL — HIGH (ref 0.5–1)
ALT FLD-CCNC: 14 U/L — SIGNIFICANT CHANGE UP (ref 12–78)
ANION GAP SERPL CALC-SCNC: 3 MMOL/L — LOW (ref 5–17)
AST SERPL-CCNC: 10 U/L — LOW (ref 15–37)
B-GLOBULIN SERPL ELPH-MCNC: 0.7 G/DL — SIGNIFICANT CHANGE UP (ref 0.5–1)
BASOPHILS # BLD AUTO: 0 K/UL — SIGNIFICANT CHANGE UP (ref 0–0.2)
BASOPHILS NFR BLD AUTO: 0 % — SIGNIFICANT CHANGE UP (ref 0–2)
BILIRUB SERPL-MCNC: 0.3 MG/DL — SIGNIFICANT CHANGE UP (ref 0.2–1.2)
BUN SERPL-MCNC: 33 MG/DL — HIGH (ref 7–23)
CALCIUM SERPL-MCNC: 9 MG/DL — SIGNIFICANT CHANGE UP (ref 8.5–10.1)
CHLORIDE SERPL-SCNC: 101 MMOL/L — SIGNIFICANT CHANGE UP (ref 96–108)
CO2 SERPL-SCNC: 36 MMOL/L — HIGH (ref 22–31)
CREAT SERPL-MCNC: 0.94 MG/DL — SIGNIFICANT CHANGE UP (ref 0.5–1.3)
EOSINOPHIL # BLD AUTO: 0.5 K/UL — SIGNIFICANT CHANGE UP (ref 0–0.5)
EOSINOPHIL NFR BLD AUTO: 4 % — SIGNIFICANT CHANGE UP (ref 0–6)
GAMMA GLOBULIN: 0.7 G/DL — SIGNIFICANT CHANGE UP (ref 0.6–1.6)
GLUCOSE SERPL-MCNC: 167 MG/DL — HIGH (ref 70–99)
GRAM STN FLD: SIGNIFICANT CHANGE UP
HCT VFR BLD CALC: 30.8 % — LOW (ref 34.5–45)
HGB BLD-MCNC: 9.2 G/DL — LOW (ref 11.5–15.5)
LYMPHOCYTES # BLD AUTO: 1.24 K/UL — SIGNIFICANT CHANGE UP (ref 1–3.3)
LYMPHOCYTES # BLD AUTO: 10 % — LOW (ref 13–44)
M-SPIKE: 0.1 G/DL — HIGH (ref 0–0)
MCHC RBC-ENTMCNC: 24.5 PG — LOW (ref 27–34)
MCHC RBC-ENTMCNC: 29.9 GM/DL — LOW (ref 32–36)
MCV RBC AUTO: 81.9 FL — SIGNIFICANT CHANGE UP (ref 80–100)
MONOCYTES # BLD AUTO: 1 K/UL — HIGH (ref 0–0.9)
MONOCYTES NFR BLD AUTO: 8 % — SIGNIFICANT CHANGE UP (ref 2–14)
NEUTROPHILS # BLD AUTO: 9.45 K/UL — HIGH (ref 1.8–7.4)
NEUTROPHILS NFR BLD AUTO: 76 % — SIGNIFICANT CHANGE UP (ref 43–77)
NRBC # BLD: SIGNIFICANT CHANGE UP /100 WBCS (ref 0–0)
PLATELET # BLD AUTO: 382 K/UL — SIGNIFICANT CHANGE UP (ref 150–400)
POTASSIUM SERPL-MCNC: 4.9 MMOL/L — SIGNIFICANT CHANGE UP (ref 3.5–5.3)
POTASSIUM SERPL-SCNC: 4.9 MMOL/L — SIGNIFICANT CHANGE UP (ref 3.5–5.3)
PROT PATTERN SERPL ELPH-IMP: SIGNIFICANT CHANGE UP
PROT SERPL-MCNC: 6 G/DL — SIGNIFICANT CHANGE UP (ref 6–8.3)
PROT SERPL-MCNC: 6 G/DL — SIGNIFICANT CHANGE UP (ref 6–8.3)
RBC # BLD: 3.76 M/UL — LOW (ref 3.8–5.2)
RBC # FLD: 16.4 % — HIGH (ref 10.3–14.5)
S PNEUM AG UR QL: NEGATIVE — SIGNIFICANT CHANGE UP
SODIUM SERPL-SCNC: 140 MMOL/L — SIGNIFICANT CHANGE UP (ref 135–145)
SPECIMEN SOURCE: SIGNIFICANT CHANGE UP
WBC # BLD: 12.44 K/UL — HIGH (ref 3.8–10.5)
WBC # FLD AUTO: 12.44 K/UL — HIGH (ref 3.8–10.5)

## 2020-10-12 PROCEDURE — 99232 SBSQ HOSP IP/OBS MODERATE 35: CPT

## 2020-10-12 PROCEDURE — 99497 ADVNCD CARE PLAN 30 MIN: CPT | Mod: 25

## 2020-10-12 PROCEDURE — 99233 SBSQ HOSP IP/OBS HIGH 50: CPT | Mod: GC

## 2020-10-12 PROCEDURE — 99233 SBSQ HOSP IP/OBS HIGH 50: CPT

## 2020-10-12 RX ORDER — INSULIN LISPRO 100/ML
4 VIAL (ML) SUBCUTANEOUS
Refills: 0 | Status: DISCONTINUED | OUTPATIENT
Start: 2020-10-12 | End: 2020-10-20

## 2020-10-12 RX ORDER — SALIVA SUBSTITUTE COMB NO.11 351 MG
5 POWDER IN PACKET (EA) MUCOUS MEMBRANE
Refills: 0 | Status: DISCONTINUED | OUTPATIENT
Start: 2020-10-12 | End: 2020-10-24

## 2020-10-12 RX ORDER — INSULIN GLARGINE 100 [IU]/ML
12 INJECTION, SOLUTION SUBCUTANEOUS AT BEDTIME
Refills: 0 | Status: DISCONTINUED | OUTPATIENT
Start: 2020-10-12 | End: 2020-10-24

## 2020-10-12 RX ORDER — FENTANYL CITRATE 50 UG/ML
1 INJECTION INTRAVENOUS
Refills: 0 | Status: DISCONTINUED | OUTPATIENT
Start: 2020-10-12 | End: 2020-10-18

## 2020-10-12 RX ADMIN — TIOTROPIUM BROMIDE 1 CAPSULE(S): 18 CAPSULE ORAL; RESPIRATORY (INHALATION) at 21:08

## 2020-10-12 RX ADMIN — FENTANYL CITRATE 1 PATCH: 50 INJECTION INTRAVENOUS at 20:48

## 2020-10-12 RX ADMIN — Medication 4 UNIT(S): at 17:39

## 2020-10-12 RX ADMIN — Medication 500 MICROGRAM(S): at 19:50

## 2020-10-12 RX ADMIN — FENTANYL CITRATE 1 PATCH: 50 INJECTION INTRAVENOUS at 14:13

## 2020-10-12 RX ADMIN — Medication 81 MILLIGRAM(S): at 11:46

## 2020-10-12 RX ADMIN — Medication 500 MICROGRAM(S): at 08:07

## 2020-10-12 RX ADMIN — Medication 650 MILLIGRAM(S): at 07:16

## 2020-10-12 RX ADMIN — APIXABAN 5 MILLIGRAM(S): 2.5 TABLET, FILM COATED ORAL at 05:13

## 2020-10-12 RX ADMIN — Medication 650 MILLIGRAM(S): at 16:20

## 2020-10-12 RX ADMIN — SENNA PLUS 2 TABLET(S): 8.6 TABLET ORAL at 21:07

## 2020-10-12 RX ADMIN — Medication 5 MILLILITER(S): at 17:37

## 2020-10-12 RX ADMIN — INSULIN GLARGINE 12 UNIT(S): 100 INJECTION, SOLUTION SUBCUTANEOUS at 21:44

## 2020-10-12 RX ADMIN — Medication 600 MILLIGRAM(S): at 05:14

## 2020-10-12 RX ADMIN — Medication 325 MILLIGRAM(S): at 11:45

## 2020-10-12 RX ADMIN — Medication 600 MILLIGRAM(S): at 17:36

## 2020-10-12 RX ADMIN — APIXABAN 5 MILLIGRAM(S): 2.5 TABLET, FILM COATED ORAL at 17:36

## 2020-10-12 RX ADMIN — POLYETHYLENE GLYCOL 3350 17 GRAM(S): 17 POWDER, FOR SOLUTION ORAL at 11:46

## 2020-10-12 RX ADMIN — BUDESONIDE AND FORMOTEROL FUMARATE DIHYDRATE 2 PUFF(S): 160; 4.5 AEROSOL RESPIRATORY (INHALATION) at 18:25

## 2020-10-12 RX ADMIN — Medication 3 UNIT(S): at 08:37

## 2020-10-12 RX ADMIN — Medication 6: at 17:38

## 2020-10-12 RX ADMIN — ATORVASTATIN CALCIUM 80 MILLIGRAM(S): 80 TABLET, FILM COATED ORAL at 21:08

## 2020-10-12 RX ADMIN — Medication 1000 UNIT(S): at 11:46

## 2020-10-12 RX ADMIN — Medication 8: at 12:40

## 2020-10-12 RX ADMIN — Medication 1 TABLET(S): at 11:45

## 2020-10-12 RX ADMIN — Medication 240 MILLIGRAM(S): at 05:13

## 2020-10-12 RX ADMIN — Medication 2: at 08:37

## 2020-10-12 RX ADMIN — Medication 40 MILLIGRAM(S): at 05:13

## 2020-10-12 RX ADMIN — AZITHROMYCIN 500 MILLIGRAM(S): 500 TABLET, FILM COATED ORAL at 11:46

## 2020-10-12 RX ADMIN — CEFTRIAXONE 100 MILLIGRAM(S): 500 INJECTION, POWDER, FOR SOLUTION INTRAMUSCULAR; INTRAVENOUS at 17:43

## 2020-10-12 RX ADMIN — MONTELUKAST 10 MILLIGRAM(S): 4 TABLET, CHEWABLE ORAL at 21:07

## 2020-10-12 RX ADMIN — PANTOPRAZOLE SODIUM 40 MILLIGRAM(S): 20 TABLET, DELAYED RELEASE ORAL at 05:13

## 2020-10-12 RX ADMIN — Medication 500 MICROGRAM(S): at 01:01

## 2020-10-12 RX ADMIN — Medication 650 MILLIGRAM(S): at 15:21

## 2020-10-12 RX ADMIN — Medication 500 MICROGRAM(S): at 13:38

## 2020-10-12 NOTE — PROGRESS NOTE ADULT - SUBJECTIVE AND OBJECTIVE BOX
60y    Female    Patient is a 60y old  Female who presents with a chief complaint of COPD exacerbation, PNA (12 Oct 2020 13:17)      Type:2  presents with steroid induced hyperglycemia On  decreasing dose of prednisone at 30 mg po daily will require insulin adjustment      PAST MEDICAL & SURGICAL HISTORY:  Ascorbic acid deficiency    Iron deficiency anemia    Constipation    GERD without esophagitis    Type 2 diabetes mellitus    HTN (hypertension)    Heart failure    End stage COPD  on 3LNC    Atrial fibrillation    Hyperlipemia    Chronic sinusitis    Raynaud phenomenon    Claustrophobia    COPD (chronic obstructive pulmonary disease)    S/P tonsillectomy        MEDICATIONS  (STANDING):  apixaban 5 milliGRAM(s) Oral every 12 hours  aspirin  chewable 81 milliGRAM(s) Oral daily  atorvastatin 80 milliGRAM(s) Oral at bedtime  azithromycin   Tablet 500 milliGRAM(s) Oral daily  Biotene Dry Mouth Oral Rinse 5 milliLiter(s) Swish and Spit two times a day  budesonide 160 MICROgram(s)/formoterol 4.5 MICROgram(s) Inhaler 2 Puff(s) Inhalation two times a day  cefTRIAXone   IVPB 1000 milliGRAM(s) IV Intermittent every 24 hours  cholecalciferol 1000 Unit(s) Oral daily  dextrose 5%. 1000 milliLiter(s) (50 mL/Hr) IV Continuous <Continuous>  dextrose 50% Injectable 12.5 Gram(s) IV Push once  dextrose 50% Injectable 25 Gram(s) IV Push once  dextrose 50% Injectable 25 Gram(s) IV Push once  diltiazem    milliGRAM(s) Oral daily  fentaNYL   Patch  12 MICROgram(s)/Hr 1 Patch Transdermal every 72 hours  ferrous    sulfate 325 milliGRAM(s) Oral daily  guaiFENesin  milliGRAM(s) Oral every 12 hours  insulin glargine Injectable (LANTUS) 10 Unit(s) SubCutaneous at bedtime  insulin lispro (HumaLOG) corrective regimen sliding scale   SubCutaneous three times a day before meals  insulin lispro (HumaLOG) corrective regimen sliding scale   SubCutaneous at bedtime  insulin lispro Injectable (HumaLOG) 3 Unit(s) SubCutaneous three times a day before meals  ipratropium    for Nebulization 500 MICROGram(s) Nebulizer every 6 hours  montelukast 10 milliGRAM(s) Oral at bedtime  multivitamin 1 Tablet(s) Oral daily  pantoprazole    Tablet 40 milliGRAM(s) Oral before breakfast  polyethylene glycol 3350 17 Gram(s) Oral daily  predniSONE   Tablet 30 milliGRAM(s) Oral daily  senna 2 Tablet(s) Oral at bedtime  tiotropium 18 MICROgram(s) Capsule 1 Capsule(s) Inhalation daily

## 2020-10-12 NOTE — PROGRESS NOTE ADULT - ATTENDING COMMENTS
60F w/ end stage COPD on 3LNC (trying to get on  transplant list at [facility]), former smoker, CAD s/p stent (2016), afib on eliquis, uncontrolled DM2 (on insulin), raynaud phenomenon and recent hospitalization at [facility] for confusion and [facility] p/w sob, admitted for acute on chronic resp failure with hypoxia and hyercarbia sec to multifocal PNA and COPD exacerabtion with end stage COPD. Plan: wean off O2, apprec pulm and palliative collaboration, apprec ID transition to po antibx, monitor clinical course, monitro resp course, slow improvement

## 2020-10-12 NOTE — PROGRESS NOTE ADULT - PROBLEM SELECTOR PLAN 7
EKG showing sinus tachycardia  - on eliquis 5mg bid and cardizem 240mg qd (home meds)  - hold theophylline and monitor on remote tele, consider tele upgrade if continues arrhythmic patter, apprec pharmD Dr Ackerman collaboration in therapeutic management and discussion/education with patient  - D/w Cardio (Dr. Whitley) - increase Cardizem to 240 mg qd  -Cardio rec (Dr. Richardson): consider restarting Duoneb; continue eliquis and cardiazem; PT for deconditioning Patient in NSR  - on eliquis 5mg bid and cardizem 240mg qd (home meds)  - hold theophylline and monitor on remote tele, consider tele upgrade if continues arrhythmic patter, ashley pharmD Dr Ackerman collaboration in therapeutic management and discussion/education with patient  - D/w Cardio (Dr. Whitley) - increase Cardizem to 240 mg qd  -Cardio rec (Dr. Richardson): consider restarting Duoneb; continue eliquis and cardiazem; PT for deconditioning

## 2020-10-12 NOTE — PROGRESS NOTE ADULT - SUBJECTIVE AND OBJECTIVE BOX
PULMONARY/CRITICAL CARE      INTERVAL HPI/OVERNIGHT EVENTS:  Less sob. Not ambulating.   60y FemaleHPI:  60F w/ end stage COPD on 3LNC (pending transplant list at [facility]), former smoker, CAD s/p stent (2016), afib on eliquis, uncontrolled DM2 (on insulin), raynaud phenomenon and recent hospitalization at [facility] for confusion and [facility] p/w sob. Sent from [facility]. Patient states that she has had worsening shortness of breath for past two days. Unable to obtain comprehensive history due to dyspnea. Patient denies fever, chills, sore throat, cough, chest pain, palpitations, abd pain, n/v. Currently feels short of breath even after receiving duonebs and steroids in the ED. Unable to keep mask on during interview and lay back in stretcher due to subjective sob. Patient repeatedly stating that it is too hot in her room and fanning herself with a paper. Per chart, Dr. Mathew (PCP) has chart notes regarding possible hallucinations. Would like her home medications now but otherwise has no acute complaints.    In the ED, VS T97.7F  /78 RR 21 SpO2 94% on 4LNC. Labs significant for mild leukocytosis of 11.41, H/H 9.9/30.5, thrombophilia of 435. CO2 35, albumin 2.4.   CXR done showing b/l patchy infiltrates, official read pending  CT chest done showing multifocal PNA and reactive mediastinal lymphadenopathy  EKG read limited by artifact, sinus tachycardia with RBBB and premature supraventricular complexes (06 Oct 2020 04:55)        PAST MEDICAL & SURGICAL HISTORY:  Ascorbic acid deficiency    Iron deficiency anemia    Constipation    GERD without esophagitis    Type 2 diabetes mellitus    HTN (hypertension)    Heart failure    End stage COPD  on 3LNC    Atrial fibrillation    Hyperlipemia    Chronic sinusitis    Raynaud phenomenon    Claustrophobia    COPD (chronic obstructive pulmonary disease)    S/P tonsillectomy          ICU Vital Signs Last 24 Hrs  T(C): 36.7 (12 Oct 2020 05:11), Max: 36.8 (11 Oct 2020 13:06)  T(F): 98 (12 Oct 2020 05:11), Max: 98.3 (11 Oct 2020 13:06)  HR: 70 (12 Oct 2020 08:09) (70 - 90)  BP: 138/78 (12 Oct 2020 05:11) (121/73 - 138/78)  BP(mean): --  ABP: --  ABP(mean): --  RR: 17 (12 Oct 2020 05:11) (17 - 22)  SpO2: 99% (12 Oct 2020 08:09) (94% - 100%)    Qtts:     I&O's Summary    11 Oct 2020 07:01  -  12 Oct 2020 07:00  --------------------------------------------------------  IN: 50 mL / OUT: 800 mL / NET: -750 mL              PHYSICAL EXAM:    GENERAL: NAD, well-groomed, well-developed, NAD  HEAD:  Atraumatic, Normocephalic  EYES: EOMI, PERRLA, conjunctiva and sclera clear  ENMT: No tonsillar erythema, exudates, or enlargement; Moist mucous membranes, Good dentition, No lesions  NECK: Supple, No JVD, Normal thyroid  NERVOUS SYSTEM:  Alert & Oriented X3, Good concentration; Motor Strength 5/5 B/L upper and lower extremities  CHEST/LUNG: Clear to percussion bilaterally; No rales, rhonchi, wheezing, or rubs Decreased bs  HEART: Regular rate and rhythm; No murmurs, rubs, or gallops  ABDOMEN: Soft, Nontender, Nondistended; Bowel sounds present  EXTREMITIES:  2+ Peripheral Pulses, No clubbing, cyanosis, or edema  LYMPH: No lymphadenopathy noted  SKIN: No rashes or lesions        LABS:                        8.9    10.88 )-----------( 483      ( 11 Oct 2020 07:04 )             30.4     10-11    139  |  99  |  36<H>  ----------------------------<  317<H>  4.4   |  34<H>  |  0.94    Ca    9.2      11 Oct 2020 07:04    TPro  6.3  /  Alb  2.4<L>  /  TBili  0.2  /  DBili  x   /  AST  12<L>  /  ALT  16  /  AlkPhos  70  10-11          vanco through     RADIOLOGY & ADDITIONAL STUDIES:      CRITICAL CARE TIME SPENT:

## 2020-10-12 NOTE — PROGRESS NOTE ADULT - SUBJECTIVE AND OBJECTIVE BOX
Patient is a 60y old  Female who presents with a chief complaint of COPD exacerbation, PNA (12 Oct 2020 08:19)      INTERVAL HPI/OVERNIGHT EVENTS:    MEDICATIONS  (STANDING):  apixaban 5 milliGRAM(s) Oral every 12 hours  aspirin  chewable 81 milliGRAM(s) Oral daily  atorvastatin 80 milliGRAM(s) Oral at bedtime  azithromycin   Tablet 500 milliGRAM(s) Oral daily  budesonide 160 MICROgram(s)/formoterol 4.5 MICROgram(s) Inhaler 2 Puff(s) Inhalation two times a day  cefTRIAXone   IVPB 1000 milliGRAM(s) IV Intermittent every 24 hours  cholecalciferol 1000 Unit(s) Oral daily  dextrose 5%. 1000 milliLiter(s) (50 mL/Hr) IV Continuous <Continuous>  dextrose 50% Injectable 12.5 Gram(s) IV Push once  dextrose 50% Injectable 25 Gram(s) IV Push once  dextrose 50% Injectable 25 Gram(s) IV Push once  diltiazem    milliGRAM(s) Oral daily  ferrous    sulfate 325 milliGRAM(s) Oral daily  guaiFENesin  milliGRAM(s) Oral every 12 hours  insulin glargine Injectable (LANTUS) 10 Unit(s) SubCutaneous at bedtime  insulin lispro (HumaLOG) corrective regimen sliding scale   SubCutaneous three times a day before meals  insulin lispro (HumaLOG) corrective regimen sliding scale   SubCutaneous at bedtime  insulin lispro Injectable (HumaLOG) 3 Unit(s) SubCutaneous three times a day before meals  ipratropium    for Nebulization 500 MICROGram(s) Nebulizer every 6 hours  montelukast 10 milliGRAM(s) Oral at bedtime  multivitamin 1 Tablet(s) Oral daily  pantoprazole    Tablet 40 milliGRAM(s) Oral before breakfast  polyethylene glycol 3350 17 Gram(s) Oral daily  predniSONE   Tablet 30 milliGRAM(s) Oral daily  senna 2 Tablet(s) Oral at bedtime  tiotropium 18 MICROgram(s) Capsule 1 Capsule(s) Inhalation daily    MEDICATIONS  (PRN):  acetaminophen   Tablet .. 650 milliGRAM(s) Oral every 6 hours PRN Mild Pain (1 - 3)  bisacodyl Suppository 10 milliGRAM(s) Rectal daily PRN Constipation  dextrose 40% Gel 15 Gram(s) Oral once PRN Blood Glucose LESS THAN 70 milliGRAM(s)/deciliter  glucagon  Injectable 1 milliGRAM(s) IntraMuscular once PRN Glucose LESS THAN 70 milligrams/deciliter  lactulose Syrup 15 Gram(s) Oral two times a day PRN constipation  oxyCODONE    IR 5 milliGRAM(s) Oral every 6 hours PRN Moderate Pain (4 - 6)      Allergies    No Known Allergies    Intolerances    albuterol (Unknown)      REVIEW OF SYSTEMS:  CONSTITUTIONAL: No fever or chills  HEENT:  No headache, no sore throat  RESPIRATORY: No cough, wheezing, or shortness of breath  CARDIOVASCULAR: No chest pain, palpitations  GASTROINTESTINAL: No abd pain, nausea, vomiting, or diarrhea  GENITOURINARY: No dysuria, frequency, or hematuria  NEUROLOGICAL: no focal weakness or dizziness  MUSCULOSKELETAL: no myalgias     Vital Signs Last 24 Hrs  T(C): 36.7 (12 Oct 2020 05:11), Max: 36.8 (11 Oct 2020 13:06)  T(F): 98 (12 Oct 2020 05:11), Max: 98.3 (11 Oct 2020 13:06)  HR: 70 (12 Oct 2020 08:09) (70 - 90)  BP: 138/78 (12 Oct 2020 05:11) (121/73 - 138/78)  BP(mean): --  RR: 17 (12 Oct 2020 05:11) (17 - 22)  SpO2: 99% (12 Oct 2020 08:09) (94% - 100%)    PHYSICAL EXAM:  GENERAL: NAD  HEENT:  anicteric, moist mucous membranes  CHEST/LUNG:  CTA b/l, no rales, wheezes, or rhonchi  HEART:  RRR, S1, S2  ABDOMEN:  BS+, soft, nontender, nondistended  EXTREMITIES: no edema, cyanosis, or calf tenderness  NERVOUS SYSTEM: answers questions and follows commands appropriately    LABS:    CBC Full  -  ( 11 Oct 2020 07:04 )  WBC Count : 10.88 K/uL  Hemoglobin : 8.9 g/dL  Hematocrit : 30.4 %  Platelet Count - Automated : 483 K/uL  Mean Cell Volume : 81.7 fl  Mean Cell Hemoglobin : 23.9 pg  Mean Cell Hemoglobin Concentration : 29.3 gm/dL  Auto Neutrophil # : 7.89 K/uL  Auto Lymphocyte # : 1.25 K/uL  Auto Monocyte # : 1.04 K/uL  Auto Eosinophil # : 0.31 K/uL  Auto Basophil # : 0.03 K/uL  Auto Neutrophil % : 72.5 %  Auto Lymphocyte % : 11.5 %  Auto Monocyte % : 9.6 %  Auto Eosinophil % : 2.8 %  Auto Basophil % : 0.3 %      Ca    9.2        11 Oct 2020 07:04          CAPILLARY BLOOD GLUCOSE      POCT Blood Glucose.: 185 mg/dL (12 Oct 2020 08:23)  POCT Blood Glucose.: 257 mg/dL (11 Oct 2020 21:10)  POCT Blood Glucose.: 242 mg/dL (11 Oct 2020 17:00)  POCT Blood Glucose.: 215 mg/dL (11 Oct 2020 12:08)        Culture - Sputum (collected 10-09-20 @ 06:28)  Source: .Sputum Sputum  Gram Stain (10-09-20 @ 13:47):    No polymorphonuclear leukocytes per low power field    Few Squamous epithelial cells per low power field    Few Gram Positive Cocci in Clusters per oil power field  Final Report (10-11-20 @ 08:47):    Normal Respiratory Samaria present    Culture - Blood (collected 10-06-20 @ 09:23)  Source: .Blood Blood  Final Report (10-11-20 @ 10:00):    No Growth Final    Culture - Blood (collected 10-06-20 @ 09:23)  Source: .Blood Blood  Final Report (10-11-20 @ 10:00):    No Growth Final        RADIOLOGY & ADDITIONAL TESTS:    Personally reviewed.     Consultant(s) Notes Reviewed:  [x] YES  [ ] NO     Patient is a 60y old  Female who presents with a chief complaint of COPD exacerbation, PNA (12 Oct 2020 08:19)      INTERVAL HPI/OVERNIGHT EVENTS: No acute overnight events. Pt seen and examined at bedside. Says she still feels short of breath, worse with exertion, but slightly improved.  She has no further complaints. Denies fevers, chills, chest pain, abdominal pain, N/V/D/C. Currently on 4L O2 saturating well.       MEDICATIONS  (STANDING):  apixaban 5 milliGRAM(s) Oral every 12 hours  aspirin  chewable 81 milliGRAM(s) Oral daily  atorvastatin 80 milliGRAM(s) Oral at bedtime  azithromycin   Tablet 500 milliGRAM(s) Oral daily  budesonide 160 MICROgram(s)/formoterol 4.5 MICROgram(s) Inhaler 2 Puff(s) Inhalation two times a day  cefTRIAXone   IVPB 1000 milliGRAM(s) IV Intermittent every 24 hours  cholecalciferol 1000 Unit(s) Oral daily  dextrose 5%. 1000 milliLiter(s) (50 mL/Hr) IV Continuous <Continuous>  dextrose 50% Injectable 12.5 Gram(s) IV Push once  dextrose 50% Injectable 25 Gram(s) IV Push once  dextrose 50% Injectable 25 Gram(s) IV Push once  diltiazem    milliGRAM(s) Oral daily  ferrous    sulfate 325 milliGRAM(s) Oral daily  guaiFENesin  milliGRAM(s) Oral every 12 hours  insulin glargine Injectable (LANTUS) 10 Unit(s) SubCutaneous at bedtime  insulin lispro (HumaLOG) corrective regimen sliding scale   SubCutaneous three times a day before meals  insulin lispro (HumaLOG) corrective regimen sliding scale   SubCutaneous at bedtime  insulin lispro Injectable (HumaLOG) 3 Unit(s) SubCutaneous three times a day before meals  ipratropium    for Nebulization 500 MICROGram(s) Nebulizer every 6 hours  montelukast 10 milliGRAM(s) Oral at bedtime  multivitamin 1 Tablet(s) Oral daily  pantoprazole    Tablet 40 milliGRAM(s) Oral before breakfast  polyethylene glycol 3350 17 Gram(s) Oral daily  predniSONE   Tablet 30 milliGRAM(s) Oral daily  senna 2 Tablet(s) Oral at bedtime  tiotropium 18 MICROgram(s) Capsule 1 Capsule(s) Inhalation daily    MEDICATIONS  (PRN):  acetaminophen   Tablet .. 650 milliGRAM(s) Oral every 6 hours PRN Mild Pain (1 - 3)  bisacodyl Suppository 10 milliGRAM(s) Rectal daily PRN Constipation  dextrose 40% Gel 15 Gram(s) Oral once PRN Blood Glucose LESS THAN 70 milliGRAM(s)/deciliter  glucagon  Injectable 1 milliGRAM(s) IntraMuscular once PRN Glucose LESS THAN 70 milligrams/deciliter  lactulose Syrup 15 Gram(s) Oral two times a day PRN constipation  oxyCODONE    IR 5 milliGRAM(s) Oral every 6 hours PRN Moderate Pain (4 - 6)      Allergies    No Known Allergies    Intolerances    albuterol (Unknown)      REVIEW OF SYSTEMS:  CONSTITUTIONAL: No fever or chills  HEENT:  +sore throat; No headache  RESPIRATORY: +shortness of breath; No cough, wheezing  CARDIOVASCULAR: No chest pain, palpitations  GASTROINTESTINAL: No abd pain, nausea, vomiting, or diarrhea  GENITOURINARY: No dysuria, frequency, or hematuria  NEUROLOGICAL: no focal weakness or dizziness  MUSCULOSKELETAL: no myalgias     Vital Signs Last 24 Hrs  T(C): 36.7 (12 Oct 2020 05:11), Max: 36.8 (11 Oct 2020 13:06)  T(F): 98 (12 Oct 2020 05:11), Max: 98.3 (11 Oct 2020 13:06)  HR: 70 (12 Oct 2020 08:09) (70 - 90)  BP: 138/78 (12 Oct 2020 05:11) (121/73 - 138/78)  BP(mean): --  RR: 17 (12 Oct 2020 05:11) (17 - 22)  SpO2: 99% (12 Oct 2020 08:09) (94% - 100%)    PHYSICAL EXAM:  GENERAL: NAD, sitting comfortably in bed   HEENT:  anicteric, EOMI b/l   CHEST/LUNG:  decreased breath sounds b/l, no rales, wheezes, or rhonchi  HEART:  RRR, S1, S2; no murmurs, rubs or gallops   ABDOMEN:  BS+, soft, nontender, nondistended  EXTREMITIES: no edema, cyanosis, or calf tenderness  NERVOUS SYSTEM: answers questions and follows commands appropriately    LABS:    CBC Full  -  ( 11 Oct 2020 07:04 )  WBC Count : 10.88 K/uL  Hemoglobin : 8.9 g/dL  Hematocrit : 30.4 %  Platelet Count - Automated : 483 K/uL  Mean Cell Volume : 81.7 fl  Mean Cell Hemoglobin : 23.9 pg  Mean Cell Hemoglobin Concentration : 29.3 gm/dL  Auto Neutrophil # : 7.89 K/uL  Auto Lymphocyte # : 1.25 K/uL  Auto Monocyte # : 1.04 K/uL  Auto Eosinophil # : 0.31 K/uL  Auto Basophil # : 0.03 K/uL  Auto Neutrophil % : 72.5 %  Auto Lymphocyte % : 11.5 %  Auto Monocyte % : 9.6 %  Auto Eosinophil % : 2.8 %  Auto Basophil % : 0.3 %      Ca    9.2        11 Oct 2020 07:04          CAPILLARY BLOOD GLUCOSE      POCT Blood Glucose.: 185 mg/dL (12 Oct 2020 08:23)  POCT Blood Glucose.: 257 mg/dL (11 Oct 2020 21:10)  POCT Blood Glucose.: 242 mg/dL (11 Oct 2020 17:00)  POCT Blood Glucose.: 215 mg/dL (11 Oct 2020 12:08)        Culture - Sputum (collected 10-09-20 @ 06:28)  Source: .Sputum Sputum  Gram Stain (10-09-20 @ 13:47):    No polymorphonuclear leukocytes per low power field    Few Squamous epithelial cells per low power field    Few Gram Positive Cocci in Clusters per oil power field  Final Report (10-11-20 @ 08:47):    Normal Respiratory Samaria present    Culture - Blood (collected 10-06-20 @ 09:23)  Source: .Blood Blood  Final Report (10-11-20 @ 10:00):    No Growth Final    Culture - Blood (collected 10-06-20 @ 09:23)  Source: .Blood Blood  Final Report (10-11-20 @ 10:00):    No Growth Final        RADIOLOGY & ADDITIONAL TESTS:    Personally reviewed.     Consultant(s) Notes Reviewed:  [x] YES  [ ] NO       Patient is a 60y old  Female who presents with a chief complaint of COPD exacerbation, PNA (12 Oct 2020 08:19)      INTERVAL HPI/OVERNIGHT EVENTS: No acute overnight events. Pt seen and examined at bedside. Says she still feels short of breath, worse with exertion, but slightly improved. Has a sore throat. Denies fevers, chills, chest pain, abdominal pain, N/V/D/C. Currently on 4L O2 saturating well.       MEDICATIONS  (STANDING):  apixaban 5 milliGRAM(s) Oral every 12 hours  aspirin  chewable 81 milliGRAM(s) Oral daily  atorvastatin 80 milliGRAM(s) Oral at bedtime  azithromycin   Tablet 500 milliGRAM(s) Oral daily  budesonide 160 MICROgram(s)/formoterol 4.5 MICROgram(s) Inhaler 2 Puff(s) Inhalation two times a day  cefTRIAXone   IVPB 1000 milliGRAM(s) IV Intermittent every 24 hours  cholecalciferol 1000 Unit(s) Oral daily  dextrose 5%. 1000 milliLiter(s) (50 mL/Hr) IV Continuous <Continuous>  dextrose 50% Injectable 12.5 Gram(s) IV Push once  dextrose 50% Injectable 25 Gram(s) IV Push once  dextrose 50% Injectable 25 Gram(s) IV Push once  diltiazem    milliGRAM(s) Oral daily  ferrous    sulfate 325 milliGRAM(s) Oral daily  guaiFENesin  milliGRAM(s) Oral every 12 hours  insulin glargine Injectable (LANTUS) 10 Unit(s) SubCutaneous at bedtime  insulin lispro (HumaLOG) corrective regimen sliding scale   SubCutaneous three times a day before meals  insulin lispro (HumaLOG) corrective regimen sliding scale   SubCutaneous at bedtime  insulin lispro Injectable (HumaLOG) 3 Unit(s) SubCutaneous three times a day before meals  ipratropium    for Nebulization 500 MICROGram(s) Nebulizer every 6 hours  montelukast 10 milliGRAM(s) Oral at bedtime  multivitamin 1 Tablet(s) Oral daily  pantoprazole    Tablet 40 milliGRAM(s) Oral before breakfast  polyethylene glycol 3350 17 Gram(s) Oral daily  predniSONE   Tablet 30 milliGRAM(s) Oral daily  senna 2 Tablet(s) Oral at bedtime  tiotropium 18 MICROgram(s) Capsule 1 Capsule(s) Inhalation daily    MEDICATIONS  (PRN):  acetaminophen   Tablet .. 650 milliGRAM(s) Oral every 6 hours PRN Mild Pain (1 - 3)  bisacodyl Suppository 10 milliGRAM(s) Rectal daily PRN Constipation  dextrose 40% Gel 15 Gram(s) Oral once PRN Blood Glucose LESS THAN 70 milliGRAM(s)/deciliter  glucagon  Injectable 1 milliGRAM(s) IntraMuscular once PRN Glucose LESS THAN 70 milligrams/deciliter  lactulose Syrup 15 Gram(s) Oral two times a day PRN constipation  oxyCODONE    IR 5 milliGRAM(s) Oral every 6 hours PRN Moderate Pain (4 - 6)      Allergies    No Known Allergies    Intolerances    albuterol (Unknown)      REVIEW OF SYSTEMS:  CONSTITUTIONAL: No fever or chills  HEENT:  +sore throat; No headache  RESPIRATORY: +shortness of breath; No cough, wheezing  CARDIOVASCULAR: No chest pain, palpitations  GASTROINTESTINAL: No abd pain, nausea, vomiting, or diarrhea  GENITOURINARY: No dysuria, frequency, or hematuria  NEUROLOGICAL: no focal weakness or dizziness  MUSCULOSKELETAL: no myalgias     Vital Signs Last 24 Hrs  T(C): 36.7 (12 Oct 2020 05:11), Max: 36.8 (11 Oct 2020 13:06)  T(F): 98 (12 Oct 2020 05:11), Max: 98.3 (11 Oct 2020 13:06)  HR: 70 (12 Oct 2020 08:09) (70 - 90)  BP: 138/78 (12 Oct 2020 05:11) (121/73 - 138/78)  BP(mean): --  RR: 17 (12 Oct 2020 05:11) (17 - 22)  SpO2: 99% (12 Oct 2020 08:09) (94% - 100%)    PHYSICAL EXAM:  GENERAL: NAD, sitting comfortably in bed   HEENT:  anicteric, EOMI b/l   CHEST/LUNG:  decreased breath sounds b/l, no rales, wheezes, or rhonchi  HEART:  RRR, S1, S2; no murmurs, rubs or gallops   ABDOMEN:  BS+, soft, nontender, nondistended  EXTREMITIES: no edema, cyanosis, or calf tenderness  NERVOUS SYSTEM: answers questions and follows commands appropriately    LABS:    CBC Full  -  ( 11 Oct 2020 07:04 )  WBC Count : 10.88 K/uL  Hemoglobin : 8.9 g/dL  Hematocrit : 30.4 %  Platelet Count - Automated : 483 K/uL  Mean Cell Volume : 81.7 fl  Mean Cell Hemoglobin : 23.9 pg  Mean Cell Hemoglobin Concentration : 29.3 gm/dL  Auto Neutrophil # : 7.89 K/uL  Auto Lymphocyte # : 1.25 K/uL  Auto Monocyte # : 1.04 K/uL  Auto Eosinophil # : 0.31 K/uL  Auto Basophil # : 0.03 K/uL  Auto Neutrophil % : 72.5 %  Auto Lymphocyte % : 11.5 %  Auto Monocyte % : 9.6 %  Auto Eosinophil % : 2.8 %  Auto Basophil % : 0.3 %      Ca    9.2        11 Oct 2020 07:04          CAPILLARY BLOOD GLUCOSE      POCT Blood Glucose.: 185 mg/dL (12 Oct 2020 08:23)  POCT Blood Glucose.: 257 mg/dL (11 Oct 2020 21:10)  POCT Blood Glucose.: 242 mg/dL (11 Oct 2020 17:00)  POCT Blood Glucose.: 215 mg/dL (11 Oct 2020 12:08)        Culture - Sputum (collected 10-09-20 @ 06:28)  Source: .Sputum Sputum  Gram Stain (10-09-20 @ 13:47):    No polymorphonuclear leukocytes per low power field    Few Squamous epithelial cells per low power field    Few Gram Positive Cocci in Clusters per oil power field  Final Report (10-11-20 @ 08:47):    Normal Respiratory Samaria present    Culture - Blood (collected 10-06-20 @ 09:23)  Source: .Blood Blood  Final Report (10-11-20 @ 10:00):    No Growth Final    Culture - Blood (collected 10-06-20 @ 09:23)  Source: .Blood Blood  Final Report (10-11-20 @ 10:00):    No Growth Final        RADIOLOGY & ADDITIONAL TESTS:    Personally reviewed.     Consultant(s) Notes Reviewed:  [x] YES  [ ] NO       Patient is a 60y old  Female who presents with a chief complaint of COPD exacerbation, PNA (12 Oct 2020 08:19)      INTERVAL HPI/OVERNIGHT EVENTS: No acute overnight events. Pt seen and examined at bedside. Patient notes mild short of breath, but slightly improved. Has a sore throat. Denies fevers, chills, chest pain, abdominal pain, N/V/D/C. Currently on 4L O2 saturating well.       MEDICATIONS  (STANDING):  apixaban 5 milliGRAM(s) Oral every 12 hours  aspirin  chewable 81 milliGRAM(s) Oral daily  atorvastatin 80 milliGRAM(s) Oral at bedtime  azithromycin   Tablet 500 milliGRAM(s) Oral daily  budesonide 160 MICROgram(s)/formoterol 4.5 MICROgram(s) Inhaler 2 Puff(s) Inhalation two times a day  cefTRIAXone   IVPB 1000 milliGRAM(s) IV Intermittent every 24 hours  cholecalciferol 1000 Unit(s) Oral daily  dextrose 5%. 1000 milliLiter(s) (50 mL/Hr) IV Continuous <Continuous>  dextrose 50% Injectable 12.5 Gram(s) IV Push once  dextrose 50% Injectable 25 Gram(s) IV Push once  dextrose 50% Injectable 25 Gram(s) IV Push once  diltiazem    milliGRAM(s) Oral daily  ferrous    sulfate 325 milliGRAM(s) Oral daily  guaiFENesin  milliGRAM(s) Oral every 12 hours  insulin glargine Injectable (LANTUS) 10 Unit(s) SubCutaneous at bedtime  insulin lispro (HumaLOG) corrective regimen sliding scale   SubCutaneous three times a day before meals  insulin lispro (HumaLOG) corrective regimen sliding scale   SubCutaneous at bedtime  insulin lispro Injectable (HumaLOG) 3 Unit(s) SubCutaneous three times a day before meals  ipratropium    for Nebulization 500 MICROGram(s) Nebulizer every 6 hours  montelukast 10 milliGRAM(s) Oral at bedtime  multivitamin 1 Tablet(s) Oral daily  pantoprazole    Tablet 40 milliGRAM(s) Oral before breakfast  polyethylene glycol 3350 17 Gram(s) Oral daily  predniSONE   Tablet 30 milliGRAM(s) Oral daily  senna 2 Tablet(s) Oral at bedtime  tiotropium 18 MICROgram(s) Capsule 1 Capsule(s) Inhalation daily    MEDICATIONS  (PRN):  acetaminophen   Tablet .. 650 milliGRAM(s) Oral every 6 hours PRN Mild Pain (1 - 3)  bisacodyl Suppository 10 milliGRAM(s) Rectal daily PRN Constipation  dextrose 40% Gel 15 Gram(s) Oral once PRN Blood Glucose LESS THAN 70 milliGRAM(s)/deciliter  glucagon  Injectable 1 milliGRAM(s) IntraMuscular once PRN Glucose LESS THAN 70 milligrams/deciliter  lactulose Syrup 15 Gram(s) Oral two times a day PRN constipation  oxyCODONE    IR 5 milliGRAM(s) Oral every 6 hours PRN Moderate Pain (4 - 6)      Allergies    No Known Allergies    Intolerances    albuterol (Unknown)      REVIEW OF SYSTEMS:  CONSTITUTIONAL: No fever or chills  HEENT:  +sore throat; No headache  RESPIRATORY: +shortness of breath; No cough, wheezing  CARDIOVASCULAR: No chest pain, palpitations  GASTROINTESTINAL: No abd pain, nausea, vomiting, or diarrhea  GENITOURINARY: No dysuria, frequency, or hematuria  NEUROLOGICAL: no focal weakness or dizziness  MUSCULOSKELETAL: no myalgias     Vital Signs Last 24 Hrs  T(C): 36.7 (12 Oct 2020 05:11), Max: 36.8 (11 Oct 2020 13:06)  T(F): 98 (12 Oct 2020 05:11), Max: 98.3 (11 Oct 2020 13:06)  HR: 70 (12 Oct 2020 08:09) (70 - 90)  BP: 138/78 (12 Oct 2020 05:11) (121/73 - 138/78)  BP(mean): --  RR: 17 (12 Oct 2020 05:11) (17 - 22)  SpO2: 99% (12 Oct 2020 08:09) (94% - 100%)    PHYSICAL EXAM:  GENERAL: NAD, sitting comfortably in bed   HEENT:  anicteric, EOMI b/l, dry mucous membranes  CHEST/LUNG:  decreased breath sounds b/l, no rales, wheezes, or rhonchi  HEART:  RRR, S1, S2; no murmurs, rubs or gallops   ABDOMEN:  BS+, soft, nontender, nondistended  EXTREMITIES: no edema, cyanosis, or calf tenderness  NERVOUS SYSTEM: answers questions and follows commands appropriately    LABS:    CBC Full  -  ( 11 Oct 2020 07:04 )  WBC Count : 10.88 K/uL  Hemoglobin : 8.9 g/dL  Hematocrit : 30.4 %  Platelet Count - Automated : 483 K/uL  Mean Cell Volume : 81.7 fl  Mean Cell Hemoglobin : 23.9 pg  Mean Cell Hemoglobin Concentration : 29.3 gm/dL  Auto Neutrophil # : 7.89 K/uL  Auto Lymphocyte # : 1.25 K/uL  Auto Monocyte # : 1.04 K/uL  Auto Eosinophil # : 0.31 K/uL  Auto Basophil # : 0.03 K/uL  Auto Neutrophil % : 72.5 %  Auto Lymphocyte % : 11.5 %  Auto Monocyte % : 9.6 %  Auto Eosinophil % : 2.8 %  Auto Basophil % : 0.3 %      Ca    9.2        11 Oct 2020 07:04          CAPILLARY BLOOD GLUCOSE      POCT Blood Glucose.: 185 mg/dL (12 Oct 2020 08:23)  POCT Blood Glucose.: 257 mg/dL (11 Oct 2020 21:10)  POCT Blood Glucose.: 242 mg/dL (11 Oct 2020 17:00)  POCT Blood Glucose.: 215 mg/dL (11 Oct 2020 12:08)        Culture - Sputum (collected 10-09-20 @ 06:28)  Source: .Sputum Sputum  Gram Stain (10-09-20 @ 13:47):    No polymorphonuclear leukocytes per low power field    Few Squamous epithelial cells per low power field    Few Gram Positive Cocci in Clusters per oil power field  Final Report (10-11-20 @ 08:47):    Normal Respiratory Samaria present    Culture - Blood (collected 10-06-20 @ 09:23)  Source: .Blood Blood  Final Report (10-11-20 @ 10:00):    No Growth Final    Culture - Blood (collected 10-06-20 @ 09:23)  Source: .Blood Blood  Final Report (10-11-20 @ 10:00):    No Growth Final        RADIOLOGY & ADDITIONAL TESTS:    Personally reviewed.     Consultant(s) Notes Reviewed:  [x] YES  [ ] NO

## 2020-10-12 NOTE — PROGRESS NOTE ADULT - SUBJECTIVE AND OBJECTIVE BOX
SUBJECTIVE AND OBJECTIVE:  INTERVAL HPI/OVERNIGHT EVENTS:    DNR on chart:   Allergies    No Known Allergies    Intolerances    albuterol (Unknown)  MEDICATIONS  (STANDING):  apixaban 5 milliGRAM(s) Oral every 12 hours  aspirin  chewable 81 milliGRAM(s) Oral daily  atorvastatin 80 milliGRAM(s) Oral at bedtime  azithromycin   Tablet 500 milliGRAM(s) Oral daily  Biotene Dry Mouth Oral Rinse 5 milliLiter(s) Swish and Spit two times a day  budesonide 160 MICROgram(s)/formoterol 4.5 MICROgram(s) Inhaler 2 Puff(s) Inhalation two times a day  cefTRIAXone   IVPB 1000 milliGRAM(s) IV Intermittent every 24 hours  cholecalciferol 1000 Unit(s) Oral daily  dextrose 5%. 1000 milliLiter(s) (50 mL/Hr) IV Continuous <Continuous>  dextrose 50% Injectable 12.5 Gram(s) IV Push once  dextrose 50% Injectable 25 Gram(s) IV Push once  dextrose 50% Injectable 25 Gram(s) IV Push once  diltiazem    milliGRAM(s) Oral daily  fentaNYL   Patch  12 MICROgram(s)/Hr 1 Patch Transdermal every 72 hours  ferrous    sulfate 325 milliGRAM(s) Oral daily  guaiFENesin  milliGRAM(s) Oral every 12 hours  insulin glargine Injectable (LANTUS) 12 Unit(s) SubCutaneous at bedtime  insulin lispro (HumaLOG) corrective regimen sliding scale   SubCutaneous three times a day before meals  insulin lispro (HumaLOG) corrective regimen sliding scale   SubCutaneous at bedtime  insulin lispro Injectable (HumaLOG) 4 Unit(s) SubCutaneous three times a day before meals  ipratropium    for Nebulization 500 MICROGram(s) Nebulizer every 6 hours  montelukast 10 milliGRAM(s) Oral at bedtime  multivitamin 1 Tablet(s) Oral daily  pantoprazole    Tablet 40 milliGRAM(s) Oral before breakfast  polyethylene glycol 3350 17 Gram(s) Oral daily  predniSONE   Tablet 30 milliGRAM(s) Oral daily  senna 2 Tablet(s) Oral at bedtime  tiotropium 18 MICROgram(s) Capsule 1 Capsule(s) Inhalation daily    MEDICATIONS  (PRN):  acetaminophen   Tablet .. 650 milliGRAM(s) Oral every 6 hours PRN Mild Pain (1 - 3)  bisacodyl Suppository 10 milliGRAM(s) Rectal daily PRN Constipation  dextrose 40% Gel 15 Gram(s) Oral once PRN Blood Glucose LESS THAN 70 milliGRAM(s)/deciliter  glucagon  Injectable 1 milliGRAM(s) IntraMuscular once PRN Glucose LESS THAN 70 milligrams/deciliter  lactulose Syrup 15 Gram(s) Oral two times a day PRN constipation  oxyCODONE    IR 5 milliGRAM(s) Oral every 6 hours PRN Moderate Pain (4 - 6)      ITEMS UNCHECKED ARE NOT PRESENT    PRESENT SYMPTOMS: [ ]Unable to obtain due to poor mentation   Source if other than patient:  [ ]Family   [ ]Team     Pain:  [ ]yes [ ]no  QOL impact -   Location -                    Aggravating factors -  Quality -  Radiation -  Timing-  Severity (0-10 scale):  Minimal acceptable level (0-10 scale):     Dyspnea:                           [ ]Mild [ ]Moderate [ ]Severe  Anxiety:                             [ ]Mild [ ]Moderate [ ]Severe  Fatigue:                             [ ]Mild [ ]Moderate [ ]Severe  Nausea:                             [ ]Mild [ ]Moderate [ ]Severe  Loss of appetite:              [ ]Mild [ ]Moderate [ ]Severe  Constipation:                    [ ]Mild [ ]Moderate [ ]Severe    CPOT:    https://www.[facility].org/getattachment/[email or web address]/Critical-Care-Pain-Observation-Tool-(CPOT)    PAIN AD Score:	  http://geriatrictoolkit.[facility]/cog/painad.pdf (Ctrl + left click to view)    Other Symptoms:  [ ]All other review of systems negative     Palliative Performance Status Version 2:         %      http://npcrc.org/files/news/palliative_performance_scale_ppsv2.pdf  PHYSICAL EXAM:  Vital Signs Last 24 Hrs  T(C): 36.4 (12 Oct 2020 13:00), Max: 36.7 (12 Oct 2020 05:11)  T(F): 97.6 (12 Oct 2020 13:00), Max: 98 (12 Oct 2020 05:11)  HR: 80 (12 Oct 2020 13:40) (70 - 83)  BP: 155/69 (12 Oct 2020 13:00) (129/77 - 155/69)  BP(mean): --  RR: 18 (12 Oct 2020 13:00) (17 - 22)  SpO2: 95% (12 Oct 2020 13:40) (94% - 100%) I&O's Summary    11 Oct 2020 07:01  -  12 Oct 2020 07:00  --------------------------------------------------------  IN: 50 mL / OUT: 800 mL / NET: -750 mL    12 Oct 2020 07:01  -  12 Oct 2020 19:24  --------------------------------------------------------  IN: 0 mL / OUT: 200 mL / NET: -200 mL       GENERAL:  [ ]Alert  [ ]Oriented x   [ ]Lethargic  [ ]Cachexia  [ ]Unarousable  [ ]Verbal  [ ]Non-Verbal  Behavioral:   [ ]Anxiety  [ ]Delirium [ ]Agitation [ ]Other  HEENT:  [ ]Normal   [ ]Dry mouth   [ ]ET Tube/Trach  [ ]Oral lesions  PULMONARY:   [ ]Clear [ ]Tachypnea  [ ]Audible excessive secretions   [ ]Rhonchi        [ ]Right [ ]Left [ ]Bilateral  [ ]Crackles        [ ]Right [ ]Left [ ]Bilateral  [ ]Wheezing     [ ]Right [ ]Left [ ]Bilateral  [ ]Diminished BS [ ] Right [ ]Left [ ]Bilateral  CARDIOVASCULAR:    [ ]Regular [ ]Irregular [ ]Tachy  [ ]Neal [ ]Murmur [ ]Other  GASTROINTESTINAL:  [ ]Soft  [ ]Distended   [ ]+BS  [ ]Non tender [ ]Tender  [ ]PEG [ ]OGT/ NGT   Last BM:      GENITOURINARY:  [ ]Normal [ ]Incontinent   [ ]Oliguria/Anuria   [ ]Rosado  MUSCULOSKELETAL:   [ ]Normal   [ ]Weakness  [ ]Bed/Wheelchair bound [ ]Edema  NEUROLOGIC:   [ ]No focal deficits  [ ] Cognitive impairment  [ ] Dysphagia [ ]Dysarthria [ ] Paresis [ ]Other   SKIN:   [ ]Normal  [ ]Rash   [ ]Pressure ulcer(s) [ ]y [ ]n present on admission    CRITICAL CARE:  [ ]Shock Present  [ ]Septic [ ]Cardiogenic [ ]Neurologic [ ]Hypovolemic  [ ]Vasopressors [ ]Inotropes  [ ]Respiratory failure present [ ]Mechanical Ventilation [ ]Non-invasive ventilatory support [ ]High-Flow   [ ]Acute  [ ]Chronic [ ]Hypoxic  [ ]Hypercarbic [ ]Other  [ ]Other organ failure     LABS:                        9.2    12.44 )-----------( 382      ( 12 Oct 2020 08:50 )             30.8   10-12    140  |  101  |  33<H>  ----------------------------<  167<H>  4.9   |  36<H>  |  0.94    Ca    9.0      12 Oct 2020 08:50    TPro  6.0  /  Alb  2.3<L>  /  TBili  0.3  /  DBili  x   /  AST  10<L>  /  ALT  14  /  AlkPhos  65  10-12        RADIOLOGY & ADDITIONAL STUDIES:    Protein Calorie Malnutrition Present: [ ]mild [ ]moderate [ ]severe [ ]underweight [ ]morbid obesity  https://www.andeal.org/vault/4160/web/files/ONC/Table_Clinical%20Characteristics%20to%20Document%20Malnutrition-White%20JV%20et%20al%328230.pdf    Height (cm): 162.6 (10-06-20 @ 01:24), 162.6 (08-01-20 @ 23:15), 162.56 (12-12-19 @ 08:06)  Weight (kg): 76.2 (10-06-20 @ 01:24), 90.3 (08-01-20 @ 23:15), 84.6 (12-12-19 @ 08:06)  BMI (kg/m2): 28.8 (10-06-20 @ 01:24), 34.2 (08-01-20 @ 23:15), 32 (12-12-19 @ 08:06)    [ ]PPSV2 < or = 30%  [ ]significant weight loss [ ]poor nutritional intake [ ]anasarca   Albumin, Serum: 2.3 g/dL (10-12-20 @ 08:50)   [ ]Artificial Nutrition    REFERRALS:   [ ]Chaplaincy  [ ]Hospice  [ ]Child Life  [ ]Social Work  [ ]Case management [ ]Holistic Therapy     Goals of Care Document:  ALVINA Gonzales (10-07-20 @ 16:36)  Goals of Care Conversation:   Participants:  · Participants  Patient    Advance Directives:  · Does patient have Advance Directive  No  · Caregiver:  yes  · Name  Michele Vega  · Phone Number  [phone]    Conversation Discussion:  · Conversation  AD  · Conversation Details  met pt, after speaking to Wang Hanson CM: pt uses O2 at home, goal to be on lung transplant list, wants HC, not [facility], pt has 2 brothers, transport pt to MD offices.  pt with SOB, on O2, pt approached regarding HCP, educated on role of hcp, pt declines to complete hcp or take a copy to think about at home.  pt unsure who will be her hcp, she has 2 brothers, Michele is younger brother.  pt spoke of coming from a long line of ill family, including her mother, she knows all about a hcp and the decisions that need to be made, but declines to discuss further, PC RN contact # given to pt, will follow as needed.    Personal Advance Directives Treatment Guidelines:   Treatment Guidelines:  · Decision Maker  Patient    Location of Discussion:   Duration of Advanced Care Planning Meeting:  · Time spent (in minutes)  25    Location of Discussion:  · Location of discussion  Face to face      Electronic Signatures:  Clementina Gonzales (RN)   (Signed 07-Oct-2020 16:48)  	Authored: Goals of Care Conversation, Personal Advance Directives Treatment Guidelines, Location of Discussion    Last Updated: 07-Oct-2020 20:52 by Suad Pinto)       SUBJECTIVE AND OBJECTIVE/INTERVAL HPI/OVERNIGHT EVENTS:  - worsening of SOB overnight with panic/anxiety  - during rounds pt stated that she's fearful of dying and is very anxious regarding her medical condition. Noted to be very labored with puffing during conversation as talking was making her more labored. Discussed trial of opioids for management of SOB. Pt in agreement.    DNR on chart:   Allergies    No Known Allergies    Intolerances    albuterol (Unknown)  MEDICATIONS  (STANDING):  apixaban 5 milliGRAM(s) Oral every 12 hours  aspirin  chewable 81 milliGRAM(s) Oral daily  atorvastatin 80 milliGRAM(s) Oral at bedtime  azithromycin   Tablet 500 milliGRAM(s) Oral daily  Biotene Dry Mouth Oral Rinse 5 milliLiter(s) Swish and Spit two times a day  budesonide 160 MICROgram(s)/formoterol 4.5 MICROgram(s) Inhaler 2 Puff(s) Inhalation two times a day  cefTRIAXone   IVPB 1000 milliGRAM(s) IV Intermittent every 24 hours  cholecalciferol 1000 Unit(s) Oral daily  dextrose 5%. 1000 milliLiter(s) (50 mL/Hr) IV Continuous <Continuous>  dextrose 50% Injectable 12.5 Gram(s) IV Push once  dextrose 50% Injectable 25 Gram(s) IV Push once  dextrose 50% Injectable 25 Gram(s) IV Push once  diltiazem    milliGRAM(s) Oral daily  fentaNYL   Patch  12 MICROgram(s)/Hr 1 Patch Transdermal every 72 hours  ferrous    sulfate 325 milliGRAM(s) Oral daily  guaiFENesin  milliGRAM(s) Oral every 12 hours  insulin glargine Injectable (LANTUS) 12 Unit(s) SubCutaneous at bedtime  insulin lispro (HumaLOG) corrective regimen sliding scale   SubCutaneous three times a day before meals  insulin lispro (HumaLOG) corrective regimen sliding scale   SubCutaneous at bedtime  insulin lispro Injectable (HumaLOG) 4 Unit(s) SubCutaneous three times a day before meals  ipratropium    for Nebulization 500 MICROGram(s) Nebulizer every 6 hours  montelukast 10 milliGRAM(s) Oral at bedtime  multivitamin 1 Tablet(s) Oral daily  pantoprazole    Tablet 40 milliGRAM(s) Oral before breakfast  polyethylene glycol 3350 17 Gram(s) Oral daily  predniSONE   Tablet 30 milliGRAM(s) Oral daily  senna 2 Tablet(s) Oral at bedtime  tiotropium 18 MICROgram(s) Capsule 1 Capsule(s) Inhalation daily    MEDICATIONS  (PRN):  acetaminophen   Tablet .. 650 milliGRAM(s) Oral every 6 hours PRN Mild Pain (1 - 3)  bisacodyl Suppository 10 milliGRAM(s) Rectal daily PRN Constipation  dextrose 40% Gel 15 Gram(s) Oral once PRN Blood Glucose LESS THAN 70 milliGRAM(s)/deciliter  glucagon  Injectable 1 milliGRAM(s) IntraMuscular once PRN Glucose LESS THAN 70 milligrams/deciliter  lactulose Syrup 15 Gram(s) Oral two times a day PRN constipation  oxyCODONE    IR 5 milliGRAM(s) Oral every 6 hours PRN Moderate Pain (4 - 6)      ITEMS UNCHECKED ARE NOT PRESENT    PRESENT SYMPTOMS: [ ]Unable to obtain due to poor mentation   Source if other than patient:  [ ]Family   [ ]Team     Pain:  [ ]yes [ x]no  QOL impact -   Location -                    Aggravating factors -  Quality -  Radiation -  Timing-  Severity (0-10 scale):  Minimal acceptable level (0-10 scale):     Dyspnea:                           [ ]Mild [ ]Moderate [ x]Severe  Anxiety:                             [ ]Mild [x ]Moderate [ ]Severe  Fatigue:                             [ ]Mild [ ]Moderate [ ]Severe  Nausea:                             [ ]Mild [ ]Moderate [ ]Severe  Loss of appetite:              [ ]Mild [ ]Moderate [ ]Severe  Constipation:                    [ ]Mild [ ]Moderate [ ]Severe    CPOT:    https://www.[facility].org/getattachment/[email or web address]/Critical-Care-Pain-Observation-Tool-(CPOT)    PAIN AD Score:	  http://geriatrictoolkit.[facility]/cog/painad.pdf (Ctrl + left click to view)    Other Symptoms:  [ x]All other review of systems negative     Palliative Performance Status Version 2:        40 %      http://npcrc.org/files/news/palliative_performance_scale_ppsv2.pdf  PHYSICAL EXAM:  Vital Signs Last 24 Hrs  T(C): 36.4 (12 Oct 2020 13:00), Max: 36.7 (12 Oct 2020 05:11)  T(F): 97.6 (12 Oct 2020 13:00), Max: 98 (12 Oct 2020 05:11)  HR: 80 (12 Oct 2020 13:40) (70 - 83)  BP: 155/69 (12 Oct 2020 13:00) (129/77 - 155/69)  BP(mean): --  RR: 18 (12 Oct 2020 13:00) (17 - 22)  SpO2: 95% (12 Oct 2020 13:40) (94% - 100%) I&O's Summary    11 Oct 2020 07:01  -  12 Oct 2020 07:00  --------------------------------------------------------  IN: 50 mL / OUT: 800 mL / NET: -750 mL    12 Oct 2020 07:01  -  12 Oct 2020 19:24  --------------------------------------------------------  IN: 0 mL / OUT: 200 mL / NET: -200 mL    GENERAL: NAD, sitting in bed, labored breathing with puffing  HEENT:  anicteric, EOMI b/l, dry mucous membranes  CHEST/LUNG:  decreased breath sounds b/l, no rales, wheezes, or rhonchi  HEART:  RRR, S1, S2; no murmurs, rubs or gallops   ABDOMEN:  BS+, soft, nontender, nondistended  EXTREMITIES: no edema, cyanosis, or calf tenderness  NERVOUS SYSTEM: answers questions and follows commands appropriately  Psych: anxious    LABS:                        9.2    12.44 )-----------( 382      ( 12 Oct 2020 08:50 )             30.8   10-12    140  |  101  |  33<H>  ----------------------------<  167<H>  4.9   |  36<H>  |  0.94    Ca    9.0      12 Oct 2020 08:50    TPro  6.0  /  Alb  2.3<L>  /  TBili  0.3  /  DBili  x   /  AST  10<L>  /  ALT  14  /  AlkPhos  65  10-12        RADIOLOGY & ADDITIONAL STUDIES: reviewed    Protein Calorie Malnutrition Present: [ ]mild [ ]moderate [ ]severe [ ]underweight [ ]morbid obesity  https://www.andeal.org/vault/1900/web/files/ONC/Table_Clinical%20Characteristics%20to%20Document%20Malnutrition-White%20JV%20et%20al%202012.pdf    Height (cm): 162.6 (10-06-20 @ 01:24), 162.6 (08-01-20 @ 23:15), 162.56 (12-12-19 @ 08:06)  Weight (kg): 76.2 (10-06-20 @ 01:24), 90.3 (08-01-20 @ 23:15), 84.6 (12-12-19 @ 08:06)  BMI (kg/m2): 28.8 (10-06-20 @ 01:24), 34.2 (08-01-20 @ 23:15), 32 (12-12-19 @ 08:06)    [ ]PPSV2 < or = 30%  [ ]significant weight loss [ ]poor nutritional intake [ ]anasarca   Albumin, Serum: 2.3 g/dL (10-12-20 @ 08:50)   [ ]Artificial Nutrition    REFERRALS:   [ ]Chaplaincy  [ ]Hospice  [ ]Child Life  [ ]Social Work  [ ]Case management [ ]Holistic Therapy     Goals of Care Document:  ALVINA Gonzales (10-07-20 @ 16:36)  Goals of Care Conversation:   Participants:  · Participants  Patient    Advance Directives:  · Does patient have Advance Directive  No  · Caregiver:  yes  · Name  Michele Vega  · Phone Number  [phone]    Conversation Discussion:  · Conversation  AD  · Conversation Details  met pt, after speaking to Wang Hanson CM: pt uses O2 at home, goal to be on lung transplant list, wants HC, not [facility], pt has 2 brothers, transport pt to MD offices.  pt with SOB, on O2, pt approached regarding HCP, educated on role of hcp, pt declines to complete hcp or take a copy to think about at home.  pt unsure who will be her hcp, she has 2 brothers, Michele is younger brother.  pt spoke of coming from a long line of ill family, including her mother, she knows all about a hcp and the decisions that need to be made, but declines to discuss further, PC RN contact # given to pt, will follow as needed.    Personal Advance Directives Treatment Guidelines:   Treatment Guidelines:  · Decision Maker  Patient    Location of Discussion:   Duration of Advanced Care Planning Meeting:  · Time spent (in minutes)  25    Location of Discussion:  · Location of discussion  Face to face      Electronic Signatures:  Clementina Gonzales (CELESTE)   (Signed 07-Oct-2020 16:48)  	Authored: Goals of Care Conversation, Personal Advance Directives Treatment Guidelines, Location of Discussion    Last Updated: 07-Oct-2020 20:52 by Suad Pinto)

## 2020-10-12 NOTE — PROGRESS NOTE ADULT - PROBLEM SELECTOR PLAN 10
On Eliquis 5mg bid, no need for further pharmacologic DVT ppx    11. Hypomagnesemia - resolved   - s/p Magnesium IVPB 1g given x 1 On Eliquis 5mg bid, no need for further pharmacologic DVT ppx

## 2020-10-12 NOTE — PROGRESS NOTE ADULT - SUBJECTIVE AND OBJECTIVE BOX
[facility]  INFECTIOUS DISEASES   =======================================================    [facility]-[id number]  CHERI HARTMAN     Follow up: COPD exacerbation, Pneumonia    No new changes or overnight event.   No fever.    PAST MEDICAL & SURGICAL HISTORY:  H/O Raynaud&#x27;s syndrome  Ascorbic acid deficiency  Iron deficiency anemia  Constipation  GERD without esophagitis  Type 2 diabetes mellitus  HTN (hypertension)  Heart failure  HLD (hyperlipidemia)  History of chronic atrial fibrillation  on Eliquis  End stage COPD  on 3LNC  History of tonsillectomy    Social Hx: former heavy smoker, no ETOH or drugs     FAMILY HISTORY:  FH: myocardial infarction    Family history of CABG    Allergies  No Known Allergies    Antibiotics:  Azithromycin   Ceftriaxone     REVIEW OF SYSTEMS:  CONSTITUTIONAL:  No Fever or chills  HEENT:  No diplopia or blurred vision.  No sore throat or runny nose.  CARDIOVASCULAR:  No chest pain or SOB.  RESPIRATORY:  +cough, severe SOB  GASTROINTESTINAL:  No nausea, vomiting or diarrhea.  GENITOURINARY:  No dysuria, frequency or urgency. No Blood in urine  MUSCULOSKELETAL:  no joint aches, no muscle pain  SKIN:  No change in skin, hair or nails.  NEUROLOGIC:  No paresthesias, fasciculations, seizures or weakness.  PSYCHIATRIC:  No disorder of thought or mood.  ENDOCRINE:  No heat or cold intolerance, polyuria or polydipsia.  HEMATOLOGICAL:  No easy bruising or bleeding.     Physical Exam:  Vital Signs Last 24 Hrs  T(C): 36.4 (12 Oct 2020 13:00), Max: 36.7 (12 Oct 2020 05:11)  T(F): 97.6 (12 Oct 2020 13:00), Max: 98 (12 Oct 2020 05:11)  HR: 83 (12 Oct 2020 13:00) (70 - 90)  BP: 155/69 (12 Oct 2020 13:00) (129/77 - 155/69)  BP(mean): --  RR: 18 (12 Oct 2020 13:00) (17 - 22)  SpO2: 97% (12 Oct 2020 13:00) (94% - 100%)  GEN: NAD  HEENT: normocephalic and atraumatic. EOMI. PERRL.    NECK: Supple.  No lymphadenopathy   LUNGS: very poor air movement but Clear to auscultation with no wheezing or rales .  HEART: Regular rate and rhythm   ABDOMEN: Soft, nontender, and nondistended.  Positive bowel sounds.    : No CVA tenderness  EXTREMITIES: Without any cyanosis, clubbing, rash, lesions or edema.  NEUROLOGIC: grossly intact.  PSYCHIATRIC: Appropriate affect .  SKIN: No ulceration or induration present.    Labs:                        9.2    12.44 )-----------( 382      ( 12 Oct 2020 08:50 )             30.8      10-12    140  |  101  |  33<H>  ----------------------------<  167<H>  4.9   |  36<H>  |  0.94    Ca    9.0      12 Oct 2020 08:50    TPro  6.0  /  Alb  2.3<L>  /  TBili  0.3  /  DBili  x   /  AST  10<L>  /  ALT  14  /  AlkPhos  65  10-12      Culture - Sputum (collected 10-09-20 @ 06:28)  Source: .Sputum Sputum  Gram Stain (10-09-20 @ 13:47):    No polymorphonuclear leukocytes per low power field    Few Squamous epithelial cells per low power field    Few Gram Positive Cocci in Clusters per oil power field  Final Report (10-11-20 @ 08:47):    Normal Respiratory Richie present    Culture - Blood (collected 10-06-20 @ 09:23)  Source: .Blood Blood  Final Report (10-11-20 @ 10:00):    No Growth Final    Culture - Blood (collected 10-06-20 @ 09:23)  Source: .Blood Blood  Final Report (10-11-20 @ 10:00):    No Growth Final    WBC Count: 12.44 K/uL (10-12-20 @ 08:50)  WBC Count: 10.88 K/uL (10-11-20 @ 07:04)  WBC Count: 11.84 K/uL (10-10-20 @ 06:48)  WBC Count: 12.81 K/uL (10-09-20 @ 07:32)  WBC Count: 12.92 K/uL (10-08-20 @ 09:17)    Creatinine, Serum: 0.94 mg/dL (10-12-20 @ 08:50)  Creatinine, Serum: 0.94 mg/dL (10-11-20 @ 07:04)  Creatinine, Serum: 1.30 mg/dL (10-10-20 @ 06:48)  Creatinine, Serum: 1.90 mg/dL (10-09-20 @ 07:32)  Creatinine, Serum: 1.50 mg/dL (10-08-20 @ 09:17)    C-Reactive Protein, Serum: 26.32 mg/dL (10-06-20 @ 09:01)    Ferritin, Serum: 96 ng/mL (10-09-20 @ 12:19)    Procalcitonin, Serum: 0.05 ng/mL (10-10-20 @ 06:48)     COVID-19 PCR: NotDetec (10-06-20 @ 03:23)    Imaging:  < from: Xray Chest 1 View-PORTABLE IMMEDIATE (Xray Chest 1 View-PORTABLE IMMEDIATE .) (10.10.20 @ 09:54) >  EXAM:  XR CHEST PORTABLE IMMED 1V                        PROCEDURE DATE:  10/10/2020    INTERPRETATION:  INDICATION: Pneumonia; follow-up assessment.  PRIORS: 10/8/2020.  VIEWS: Portable AP radiography of the chest performed.  FINDINGS: Heart size appears within normal limits. No hilar or superior mediastinal abnormalities are identified. Infiltrates within the bilateral mid to lower lung fields are without significant change. No pleural effusion or pneumothorax is demonstrated.No mediastinal shift is noted. The visualized osseous structures appear unremarkable.  IMPRESSION: No significant interval change.    Assessment and Plan:   59y/o woman with end stage COPD on 3L O2 at home awaiting transplant at [facility], former smoker, CAD s/p stent, afib, DM2), raynaud phenomenon and recent hospitalization at [facility] for confusion and [facility] has been sent from [facility] with worsening of SOB. CT with multilobar opacities. Due to recent hospitalization and rehab stay, will cover for possible HCAP. Very high risk with a poor lung capacities.     COPD, Pneumonia  - Blood culture NGTD  - Sputum culture with normal respiratory richie  - Legionella U ag negative   - CXR and CT with multilobar opacities  - Respiratory and pulmonary consults noted  - TTE, result noted, EF=55%, no pulmonary edema suspected   - Continue ceftriaxone 1gm daily, will complete at least 7  - Will stop azithromycin completed 5days    Will follow.    Wagner Juarez MD  Division of Infectious Diseases   Cell [phone] between 7am and 5pm   After 5pm and weekends please call ID service at [phone].   .              Attending Attestation:   Plan discussed with Dr. Ruiz.

## 2020-10-12 NOTE — PROGRESS NOTE ADULT - PROBLEM SELECTOR PLAN 4
on metformin and glargine 12 U qhs, hold metformin while in hospital  - glargine 10units qhs  - continue humalog 3 units TID premeal   - moderate dose ISS  - accuchecks, hypoglycemia protocol  - Hba1c: 6.0.

## 2020-10-12 NOTE — PROGRESS NOTE ADULT - SUBJECTIVE AND OBJECTIVE BOX
Patient is a 60y old  Female who presents with a chief complaint of COPD exacerbation, PNA (08 Oct 2020 11:37)       HPI:  60F w/ end stage COPD on 3LNC (pending transplant list at [facility]), former smoker, CAD s/p stent (2016), afib on eliquis, uncontrolled DM2 (on insulin), raynaud phenomenon and recent hospitalization at [facility] for confusion and [facility] p/w sob. Sent from [facility]. Patient states that she has had worsening shortness of breath for past two days. Unable to obtain comprehensive history due to dyspnea. Patient denies fever, chills, sore throat, cough, chest pain, palpitations, abd pain, n/v. Currently feels short of breath even after receiving duonebs and steroids in the ED. Unable to keep mask on during interview and lay back in stretcher due to subjective sob. Patient repeatedly stating that it is too hot in her room and fanning herself with a paper. Per chart, Dr. Mathew (PCP) has chart notes regarding possible hallucinations. Would like her home medications now but otherwise has no acute complaints.  Has not seen nephrologist.  Denies any N/V.  SOB improving.  States she is urinating well.  Denies any urinary retention.  Denies any supplement or NSAID usage.         Getting breathing treatment    PAST MEDICAL & SURGICAL HISTORY:  H/O Raynaud&#x27;s syndrome    Ascorbic acid deficiency    Iron deficiency anemia    Constipation    GERD without esophagitis    Type 2 diabetes mellitus    HTN (hypertension)    Heart failure    HLD (hyperlipidemia)    History of chronic atrial fibrillation  on Eliquis    End stage COPD  on 3LNC    Atrial fibrillation    Hyperlipemia    Chronic sinusitis    Raynaud phenomenon    HTN (hypertension)    DM (diabetes mellitus)    Claustrophobia    COPD (chronic obstructive pulmonary disease)    History of tonsillectomy    S/P tonsillectomy         FAMILY HISTORY:  FH: myocardial infarction    Family history of CABG    FH: MI (myocardial infarction)    FH: CABG (coronary artery bypass surgery)    NC    Social History:Non smoker    MEDICATIONS  (STANDING):  apixaban 5 milliGRAM(s) Oral every 12 hours  ascorbic acid 500 milliGRAM(s) Oral daily  aspirin  chewable 81 milliGRAM(s) Oral daily  atorvastatin 80 milliGRAM(s) Oral at bedtime  azithromycin  IVPB 500 milliGRAM(s) IV Intermittent every 24 hours  budesonide 160 MICROgram(s)/formoterol 4.5 MICROgram(s) Inhaler 2 Puff(s) Inhalation two times a day  cefepime   IVPB 2000 milliGRAM(s) IV Intermittent every 12 hours  cholecalciferol 1000 Unit(s) Oral daily  dextrose 5%. 1000 milliLiter(s) (50 mL/Hr) IV Continuous <Continuous>  dextrose 50% Injectable 12.5 Gram(s) IV Push once  dextrose 50% Injectable 25 Gram(s) IV Push once  dextrose 50% Injectable 25 Gram(s) IV Push once  ferrous    sulfate 325 milliGRAM(s) Oral daily  insulin glargine Injectable (LANTUS) 10 Unit(s) SubCutaneous at bedtime  insulin lispro (HumaLOG) corrective regimen sliding scale   SubCutaneous three times a day before meals  insulin lispro Injectable (HumaLOG) 3 Unit(s) SubCutaneous three times a day before meals  montelukast 10 milliGRAM(s) Oral at bedtime  multivitamin 1 Tablet(s) Oral daily  pantoprazole    Tablet 40 milliGRAM(s) Oral before breakfast  polyethylene glycol 3350 17 Gram(s) Oral daily  potassium chloride    Tablet ER 40 milliEquivalent(s) Oral every 4 hours  senna 2 Tablet(s) Oral at bedtime  tiotropium 18 MICROgram(s) Capsule 1 Capsule(s) Inhalation daily    MEDICATIONS  (PRN):  acetaminophen   Tablet .. 650 milliGRAM(s) Oral every 6 hours PRN Mild Pain (1 - 3)  bisacodyl Suppository 10 milliGRAM(s) Rectal daily PRN Constipation  dextrose 40% Gel 15 Gram(s) Oral once PRN Blood Glucose LESS THAN 70 milliGRAM(s)/deciliter  glucagon  Injectable 1 milliGRAM(s) IntraMuscular once PRN Glucose LESS THAN 70 milligrams/deciliter  guaiFENesin   Syrup  (Sugar-Free) 100 milliGRAM(s) Oral every 6 hours PRN Cough  lactulose Syrup 15 Gram(s) Oral two times a day PRN constipation  levalbuterol Inhalation 0.63 milliGRAM(s) Inhalation every 4 hours PRN shortness of breath and/or wheezing  oxyCODONE    IR 5 milliGRAM(s) Oral every 6 hours PRN Moderate Pain (4 - 6)   Meds reviewed    Allergies    No Known Allergies    Intolerances    albuterol (Unknown)       REVIEW OF SYSTEMS:    CONSTITUTIONAL:  No weight loss   EYES: No eye pain, visual disturbances, or discharge  ENMT:  No difficulty hearing, tinnitus, vertigo; No sinus or throat pain  NECK: No pain or stiffness  BREASTS: No pain, masses, or nipple discharge  RESPIRATORY: +SOB. No wheeze. No NIELSON  CARDIOVASCULAR: No chest pain, palpitations, dizziness,   GASTROINTESTINAL: No abdominal or epigastric pain. No nausea, vomiting, or hematemesis;  GENITOURINARY: No dysuria, frequency, hematuria, or incontinence  NEUROLOGICAL: No headaches, memory loss, loss of strength, numbness, or tremors  SKIN: no Diffuse erythema, no blisters  LYMPH NODES: No enlarged glands  ENDOCRINE: No heat or cold intolerance; No hair loss  MUSCULOSKELETAL: No joint pain or swelling   PSYCHIATRIC: No depression, anxiety, mood swings, or difficulty sleeping  ALLERGY AND IMMUNOLOGIC: No hives or eczema      ICU Vital Signs Last 24 Hrs  T(C): 36.4 (12 Oct 2020 13:00), Max: 36.7 (12 Oct 2020 05:11)  T(F): 97.6 (12 Oct 2020 13:00), Max: 98 (12 Oct 2020 05:11)  HR: 80 (12 Oct 2020 13:40) (70 - 83)  BP: 155/69 (12 Oct 2020 13:00) (129/77 - 155/69)  BP(mean): --  ABP: --  ABP(mean): --  RR: 18 (12 Oct 2020 13:00) (17 - 22)  SpO2: 95% (12 Oct 2020 13:40) (94% - 100%)          PHYSICAL EXAM:    GENERAL: No distress  HEAD:  Atraumatic, Normocephalic  EYES: EOMI, conjunctiva and sclera clear  ENMT: No drainage from nares, no drainage from pinna  NECK: Supple, neck  veins full  NERVOUS SYSTEM:  Alert & Oriented X3, Good concentration; Motor Strength wnl upper and lower extremities  CHEST/LUNG: Decreased BS, B/L rales, no rhonchi, Mild wheezing  HEART: Regular rate and rhythm; No murmurs, rubs, or gallops  ABDOMEN: Soft, Nontender, Nondistended; Bowel sounds present,  EXTREMITIES: trace Edema  SKIN: No rashes or lesions, pale      LABS:                                              9.2    12.44 )-----------( 382      ( 12 Oct 2020 08:50 )             30.8     10-12    140  |  101  |  33<H>  ----------------------------<  167<H>  4.9   |  36<H>  |  0.94    Ca    9.0      12 Oct 2020 08:50    TPro  6.0  /  Alb  2.3<L>  /  TBili  0.3  /  DBili  x   /  AST  10<L>  /  ALT  14  /  AlkPhos  65  10-12

## 2020-10-12 NOTE — PROGRESS NOTE ADULT - PROBLEM SELECTOR PLAN 1
Lantus 12 units sq hs  Lispro  4 units sq ac  low scale  Monitor glucose trends  Goal range 100 to 180mg/dl

## 2020-10-12 NOTE — PROGRESS NOTE ADULT - PROBLEM SELECTOR PLAN 6
recent TTE in 08/2020 showing normal LVEF, stage 1 diastolic dysfunction  - hold bumex 2/2 to [facility]  -TTE (10/6/20) LVEF of 55%   - caution with excessive IVF recent TTE in 08/2020 showing normal LVEF, stage 1 diastolic dysfunction  - TTE (10/6/20) LVEF of 55%   - bumex held 2/2 to [facility], will resume as needed as per nephro recs  - caution with excessive IVF

## 2020-10-12 NOTE — PROGRESS NOTE ADULT - ASSESSMENT
60F w/ end stage COPD on 3LNC (pending transplant list at [facility]), former smoker, CAD s/p stent (2016), Afib on Eliquis,  uncontrolled DM2 (on insulin), raynaud phenomenon and recent hospitalization at [facility] for confusion and [facility] p/w sob, admitted for COPD exacerbation and multifocal PNA    CAD s/p stent   - Continue asa, statin  - Denies ischemic symptoms   - EKG showed SR @ 109, RBBB    Paroxysmal Afib  - Tele showed SR 70-80s   - Continue Eliquis 5mg  - Continue Cardizem   - TTE w/ mild concentric LVH grossly nL LVSF ef 55%, mild MR    COPD/Pneumonia  - CT chest noted  - per pulm/ID  - Continue steroids and antibiotics     VTE ppx  - Continue apixaban      - Monitor and replete lytes, keep K>4, Mg>2.  - All other medical needs as per primary team.  - Other cardiovascular workup will depend on clinical course.  - Will continue to follow.    David Albert DNP, ANP-c  Cardiology   Spectra #[id number] [phone]

## 2020-10-12 NOTE — PROGRESS NOTE ADULT - ASSESSMENT
Pt. with endstage COPD on transplant list, with acute pneumonia.  Improved clincally.  Can taper steroids.   Continue antibiotics  Trial atrovent hhn which helped.   Inhalers.  PT.

## 2020-10-12 NOTE — PROGRESS NOTE ADULT - PROBLEM SELECTOR PLAN 3
Improving ; unclear etiology - possibly 2/2 to prerenal vs hypoxemic  - nephro (Dr. Mullen) consulted, recs appreciated - Check UA, Ur lytes, UPC, Ur osm, Corrected calcium 10.2, with paraprotein gap and anemia,  check FLC and SPEP  -Iron 38; unsat ; TIBC 236; % sat 16   - Renal US: no hydronephrosis   - bumetanide held 2/2 to ATI  - trend renal indices  - avoid nephrotoxic meds   - renally dose meds.  - Did not receive albumin as patient was anxious about it Resolved, Cr. 0.94 this AM   - likely 2/2 to prerenal vs hypoxemic  - bumetanide held 2/2 to [facility], will resume as needed as per nephro recs  - trend renal indices

## 2020-10-12 NOTE — PROGRESS NOTE ADULT - SUBJECTIVE AND OBJECTIVE BOX
[facility] -- Adriana Gonzales, Gina, Gutierrez Mello Patel, Savella  Office # [phone]      Follow Up:    SOB  Subjective/Observations:   No events overnight resting comfortably in bed.  No complaints of chest pain, dyspnea, or palpitations reported. No signs of orthopnea or PND.     REVIEW OF SYSTEMS: All other review of systems is negative unless indicated above    PAST MEDICAL & SURGICAL HISTORY:  Ascorbic acid deficiency    Iron deficiency anemia    Constipation    GERD without esophagitis    Type 2 diabetes mellitus    HTN (hypertension)    Heart failure    End stage COPD  on 3LNC    Atrial fibrillation    Hyperlipemia    Chronic sinusitis    Raynaud phenomenon    Claustrophobia    COPD (chronic obstructive pulmonary disease)    S/P tonsillectomy        MEDICATIONS  (STANDING):  apixaban 5 milliGRAM(s) Oral every 12 hours  aspirin  chewable 81 milliGRAM(s) Oral daily  atorvastatin 80 milliGRAM(s) Oral at bedtime  azithromycin   Tablet 500 milliGRAM(s) Oral daily  budesonide 160 MICROgram(s)/formoterol 4.5 MICROgram(s) Inhaler 2 Puff(s) Inhalation two times a day  cefTRIAXone   IVPB 1000 milliGRAM(s) IV Intermittent every 24 hours  cholecalciferol 1000 Unit(s) Oral daily  dextrose 5%. 1000 milliLiter(s) (50 mL/Hr) IV Continuous <Continuous>  dextrose 50% Injectable 12.5 Gram(s) IV Push once  dextrose 50% Injectable 25 Gram(s) IV Push once  dextrose 50% Injectable 25 Gram(s) IV Push once  diltiazem    milliGRAM(s) Oral daily  ferrous    sulfate 325 milliGRAM(s) Oral daily  guaiFENesin  milliGRAM(s) Oral every 12 hours  insulin glargine Injectable (LANTUS) 10 Unit(s) SubCutaneous at bedtime  insulin lispro (HumaLOG) corrective regimen sliding scale   SubCutaneous three times a day before meals  insulin lispro (HumaLOG) corrective regimen sliding scale   SubCutaneous at bedtime  insulin lispro Injectable (HumaLOG) 3 Unit(s) SubCutaneous three times a day before meals  ipratropium    for Nebulization 500 MICROGram(s) Nebulizer every 6 hours  montelukast 10 milliGRAM(s) Oral at bedtime  multivitamin 1 Tablet(s) Oral daily  pantoprazole    Tablet 40 milliGRAM(s) Oral before breakfast  polyethylene glycol 3350 17 Gram(s) Oral daily  predniSONE   Tablet 40 milliGRAM(s) Oral daily  senna 2 Tablet(s) Oral at bedtime  tiotropium 18 MICROgram(s) Capsule 1 Capsule(s) Inhalation daily    MEDICATIONS  (PRN):  acetaminophen   Tablet .. 650 milliGRAM(s) Oral every 6 hours PRN Mild Pain (1 - 3)  bisacodyl Suppository 10 milliGRAM(s) Rectal daily PRN Constipation  dextrose 40% Gel 15 Gram(s) Oral once PRN Blood Glucose LESS THAN 70 milliGRAM(s)/deciliter  glucagon  Injectable 1 milliGRAM(s) IntraMuscular once PRN Glucose LESS THAN 70 milligrams/deciliter  lactulose Syrup 15 Gram(s) Oral two times a day PRN constipation  oxyCODONE    IR 5 milliGRAM(s) Oral every 6 hours PRN Moderate Pain (4 - 6)      Allergies    No Known Allergies    Intolerances    albuterol (Unknown)      Vital Signs Last 24 Hrs  T(C): 36.7 (12 Oct 2020 05:11), Max: 36.8 (11 Oct 2020 13:06)  T(F): 98 (12 Oct 2020 05:11), Max: 98.3 (11 Oct 2020 13:06)  HR: 70 (12 Oct 2020 05:11) (70 - 90)  BP: 138/78 (12 Oct 2020 05:11) (121/73 - 138/78)  BP(mean): --  RR: 17 (12 Oct 2020 05:11) (17 - 22)  SpO2: 99% (12 Oct 2020 05:11) (94% - 100%)    I&O's Summary    11 Oct 2020 07:01  -  12 Oct 2020 07:00  --------------------------------------------------------  IN: 50 mL / OUT: 800 mL / NET: -750 mL          PHYSICAL EXAM:  TELE:   Constitutional: NAD, awake and alert, well-developed  HEENT: Moist Mucous Membranes, Anicteric  Pulmonary: Non-labored, breath sounds with Bilaterally diminished at bases  No  wheezes, crackles or rhonchi   Cardiovascular: Regular, S1 and S2 nl, No murmurs, rubs, gallops or clicks  Gastrointestinal: Bowel Sounds present, soft, nontender.   Lymph: No lymphadenopathy. No peripheral edema.  Skin: No visible rashes or ulcers.  Psych:  Mood & affect appropriate    LABS: All Labs Reviewed:                        8.9    10.88 )-----------( 483      ( 11 Oct 2020 07:04 )             30.4                         9.5    11.84 )-----------( 465      ( 10 Oct 2020 06:48 )             32.1     11 Oct 2020 07:04    139    |  99     |  36     ----------------------------<  317    4.4     |  34     |  0.94   10 Oct 2020 06:48    138    |  97     |  43     ----------------------------<  203    4.8     |  38     |  1.30     Ca    9.2        11 Oct 2020 07:04  Ca    9.4        10 Oct 2020 06:48  Phos  2.9       10 Oct 2020 06:48  Mg     2.4       10 Oct 2020 06:48    TPro  6.3    /  Alb  2.4    /  TBili  0.2    /  DBili  x      /  AST  12     /  ALT  16     /  AlkPhos  70     11 Oct 2020 07:04  TPro  6.8    /  Alb  2.5    /  TBili  0.2    /  DBili  x      /  AST  17     /  ALT  19     /  AlkPhos  76     10 Oct 2020 06:48    12 Lead ECG:   Ventricular Rate 109 BPM  Atrial Rate 109 BPM  P-R Interval 150 ms  QRS Duration 136 ms  Q-T Interval 390 ms  QTC Calculation(Bazett) 525 ms  P Axis 56 degrees  R Axis -16 degrees  T Axis 57 degrees  Diagnosis Line Sinus tachycardia with premature supraventricular complexes  Right bundle branch block  Abnormal ECG  Confirmed by LUIS ENRIQUE CORMIER ([id number]) on 10/8/2020 11:41:04 AM (10-08-20 @ 08:51)    < from: TTE Echo Complete w/o Contrast w/ Doppler (10.06.20 @ 17:10) >  Dimensions:  LA 3.7       Normal Values: 2.0 - 4.0 cm  Ao 2.7        Normal Values: 2.0 - 3.8 cm  SEPTUM 1.4       Normal Values: 0.6 - 1.2 cm  PWT 1.3       Normal Values: 0.6 - 1.1 cm  LVIDd 3.9         Normal Values: 3.0 - 5.6 cm  LVIDs 2.8         Normal Values: 1.8 - 4.0 cm    OBSERVATIONS:  Technically difficult and limited study  Mitral Valve: Thickened leaflets, mild MR.  Aortic Valve/Aorta: Aortic valve is not well-visualized, grossly normal function without severe pathology  Tricuspid Valve: normal with trace TR.  Pulmonic Valve: Not well-visualized  Left Atrium: normal  Right Atrium: Not well-visualized  Left Ventricle: normal LV size and systolic function, estimated LVEF of 55%. Mild LVH. Endocardium is not well-visualized, cannot rule out segmental dysfunction  Right Ventricle: Grossly normal size and systolic function.  Pericardium/Pleura: normal, no significant pericardial effusion.  Pulmonary/RV Pressure: estimated PA systolic pressure of 15.7 mmHg assuming an RA pressure of 3 mmHg.  LV diastolic dysfunction is present    Conclusion:  Technically difficult and limited study  Mild concentric LVH with grossly normal left ventricular systolic function, estimated LVEF of 55%.  Grossly normal RV size and systolic function.  Aortic valve is not well-visualized, grossly normal function without severe pathology  Mild MR  Trace TR.  No significant pericardial effusion.    < end of copied text >    < from: Cardiac Cath Lab - Adult (03.24.17 @ 11:16) >  VENTRICLES: Global left ventricular function was normal. EF estimated was  65 %.  CORONARY VESSELS: The coronary circulation is right dominant.  LM:   --  Mid left main: There was a 30 % stenosis.  LAD:   --  Ostial LAD: There was a 40 % stenosis.  CX:   --  Circumflex: Normal.  RCA:   --  RCA: Normal.  COMPLICATIONS: There were no complications.  DIAGNOSTIC RECOMMENDATIONS: The patient should continue with the present  medications.  < end of copied text >                       [facility] -- Adriana Gonzales, Gina, Gutierrez Mlelo Patel, Savella  Office # [phone]      Follow Up:  SOB    Subjective/Observations:   No events overnight resting comfortably in bed.  No complaints of chest pain, dyspnea, or palpitations reported. No signs of orthopnea or PND.     REVIEW OF SYSTEMS: All other review of systems is negative unless indicated above    PAST MEDICAL & SURGICAL HISTORY:  Ascorbic acid deficiency    Iron deficiency anemia    Constipation    GERD without esophagitis    Type 2 diabetes mellitus    HTN (hypertension)    Heart failure    End stage COPD  on 3LNC    Atrial fibrillation    Hyperlipemia    Chronic sinusitis    Raynaud phenomenon    Claustrophobia    COPD (chronic obstructive pulmonary disease)    S/P tonsillectomy        MEDICATIONS  (STANDING):  apixaban 5 milliGRAM(s) Oral every 12 hours  aspirin  chewable 81 milliGRAM(s) Oral daily  atorvastatin 80 milliGRAM(s) Oral at bedtime  azithromycin   Tablet 500 milliGRAM(s) Oral daily  budesonide 160 MICROgram(s)/formoterol 4.5 MICROgram(s) Inhaler 2 Puff(s) Inhalation two times a day  cefTRIAXone   IVPB 1000 milliGRAM(s) IV Intermittent every 24 hours  cholecalciferol 1000 Unit(s) Oral daily  dextrose 5%. 1000 milliLiter(s) (50 mL/Hr) IV Continuous <Continuous>  dextrose 50% Injectable 12.5 Gram(s) IV Push once  dextrose 50% Injectable 25 Gram(s) IV Push once  dextrose 50% Injectable 25 Gram(s) IV Push once  diltiazem    milliGRAM(s) Oral daily  ferrous    sulfate 325 milliGRAM(s) Oral daily  guaiFENesin  milliGRAM(s) Oral every 12 hours  insulin glargine Injectable (LANTUS) 10 Unit(s) SubCutaneous at bedtime  insulin lispro (HumaLOG) corrective regimen sliding scale   SubCutaneous three times a day before meals  insulin lispro (HumaLOG) corrective regimen sliding scale   SubCutaneous at bedtime  insulin lispro Injectable (HumaLOG) 3 Unit(s) SubCutaneous three times a day before meals  ipratropium    for Nebulization 500 MICROGram(s) Nebulizer every 6 hours  montelukast 10 milliGRAM(s) Oral at bedtime  multivitamin 1 Tablet(s) Oral daily  pantoprazole    Tablet 40 milliGRAM(s) Oral before breakfast  polyethylene glycol 3350 17 Gram(s) Oral daily  predniSONE   Tablet 40 milliGRAM(s) Oral daily  senna 2 Tablet(s) Oral at bedtime  tiotropium 18 MICROgram(s) Capsule 1 Capsule(s) Inhalation daily    MEDICATIONS  (PRN):  acetaminophen   Tablet .. 650 milliGRAM(s) Oral every 6 hours PRN Mild Pain (1 - 3)  bisacodyl Suppository 10 milliGRAM(s) Rectal daily PRN Constipation  dextrose 40% Gel 15 Gram(s) Oral once PRN Blood Glucose LESS THAN 70 milliGRAM(s)/deciliter  glucagon  Injectable 1 milliGRAM(s) IntraMuscular once PRN Glucose LESS THAN 70 milligrams/deciliter  lactulose Syrup 15 Gram(s) Oral two times a day PRN constipation  oxyCODONE    IR 5 milliGRAM(s) Oral every 6 hours PRN Moderate Pain (4 - 6)      Allergies    No Known Allergies    Intolerances    albuterol (Unknown)      Vital Signs Last 24 Hrs  T(C): 36.7 (12 Oct 2020 05:11), Max: 36.8 (11 Oct 2020 13:06)  T(F): 98 (12 Oct 2020 05:11), Max: 98.3 (11 Oct 2020 13:06)  HR: 70 (12 Oct 2020 05:11) (70 - 90)  BP: 138/78 (12 Oct 2020 05:11) (121/73 - 138/78)  BP(mean): --  RR: 17 (12 Oct 2020 05:11) (17 - 22)  SpO2: 99% (12 Oct 2020 05:11) (94% - 100%)    I&O's Summary    11 Oct 2020 07:01  -  12 Oct 2020 07:00  --------------------------------------------------------  IN: 50 mL / OUT: 800 mL / NET: -750 mL          PHYSICAL EXAM:  TELE: SR  Constitutional: NAD, awake  HEENT: Moist Mucous Membranes, Anicteric  Pulmonary: Decreased breath sounds b/l. No rales, crackles or wheeze appreciated.   Cardiovascular: Regular, S1 and S2 nl, No murmurs, rubs, gallops or clicks  Gastrointestinal: Bowel Sounds present, soft, nontender.   Lymph: No lymphadenopathy. No peripheral edema.  Skin: No visible rashes or ulcers.  Psych:  Mood & affect appropriate    LABS: All Labs Reviewed:                        8.9    10.88 )-----------( 483      ( 11 Oct 2020 07:04 )             30.4                         9.5    11.84 )-----------( 465      ( 10 Oct 2020 06:48 )             32.1     11 Oct 2020 07:04    139    |  99     |  36     ----------------------------<  317    4.4     |  34     |  0.94   10 Oct 2020 06:48    138    |  97     |  43     ----------------------------<  203    4.8     |  38     |  1.30     Ca    9.2        11 Oct 2020 07:04  Ca    9.4        10 Oct 2020 06:48  Phos  2.9       10 Oct 2020 06:48  Mg     2.4       10 Oct 2020 06:48    TPro  6.3    /  Alb  2.4    /  TBili  0.2    /  DBili  x      /  AST  12     /  ALT  16     /  AlkPhos  70     11 Oct 2020 07:04  TPro  6.8    /  Alb  2.5    /  TBili  0.2    /  DBili  x      /  AST  17     /  ALT  19     /  AlkPhos  76     10 Oct 2020 06:48    12 Lead ECG:   Ventricular Rate 109 BPM  Atrial Rate 109 BPM  P-R Interval 150 ms  QRS Duration 136 ms  Q-T Interval 390 ms  QTC Calculation(Bazett) 525 ms  P Axis 56 degrees  R Axis -16 degrees  T Axis 57 degrees  Diagnosis Line Sinus tachycardia with premature supraventricular complexes  Right bundle branch block  Abnormal ECG  Confirmed by LUIS ENRIQUE CORMIER ([id number]) on 10/8/2020 11:41:04 AM (10-08-20 @ 08:51)    < from: TTE Echo Complete w/o Contrast w/ Doppler (10.06.20 @ 17:10) >  Dimensions:  LA 3.7       Normal Values: 2.0 - 4.0 cm  Ao 2.7        Normal Values: 2.0 - 3.8 cm  SEPTUM 1.4       Normal Values: 0.6 - 1.2 cm  PWT 1.3       Normal Values: 0.6 - 1.1 cm  LVIDd 3.9         Normal Values: 3.0 - 5.6 cm  LVIDs 2.8         Normal Values: 1.8 - 4.0 cm    OBSERVATIONS:  Technically difficult and limited study  Mitral Valve: Thickened leaflets, mild MR.  Aortic Valve/Aorta: Aortic valve is not well-visualized, grossly normal function without severe pathology  Tricuspid Valve: normal with trace TR.  Pulmonic Valve: Not well-visualized  Left Atrium: normal  Right Atrium: Not well-visualized  Left Ventricle: normal LV size and systolic function, estimated LVEF of 55%. Mild LVH. Endocardium is not well-visualized, cannot rule out segmental dysfunction  Right Ventricle: Grossly normal size and systolic function.  Pericardium/Pleura: normal, no significant pericardial effusion.  Pulmonary/RV Pressure: estimated PA systolic pressure of 15.7 mmHg assuming an RA pressure of 3 mmHg.  LV diastolic dysfunction is present    Conclusion:  Technically difficult and limited study  Mild concentric LVH with grossly normal left ventricular systolic function, estimated LVEF of 55%.  Grossly normal RV size and systolic function.  Aortic valve is not well-visualized, grossly normal function without severe pathology  Mild MR  Trace TR.  No significant pericardial effusion.    < end of copied text >    < from: Cardiac Cath Lab - Adult (03.24.17 @ 11:16) >  VENTRICLES: Global left ventricular function was normal. EF estimated was  65 %.  CORONARY VESSELS: The coronary circulation is right dominant.  LM:   --  Mid left main: There was a 30 % stenosis.  LAD:   --  Ostial LAD: There was a 40 % stenosis.  CX:   --  Circumflex: Normal.  RCA:   --  RCA: Normal.  COMPLICATIONS: There were no complications.  DIAGNOSTIC RECOMMENDATIONS: The patient should continue with the present  medications.  < end of copied text >

## 2020-10-12 NOTE — PROGRESS NOTE ADULT - ASSESSMENT
AURORA on CKD 2  Hyponatremia  Hypokalemia  COPD  Hypercalcemia     -BLCr 1  -AURORA unclear etiology, clinically ATN  -UA - 3-5 WBC, trace ketones, 30 proteins  -FeUrea 34.6%  -UPC 0.44  -Ur osm 454  -Corrected calcium 10.2, with paraprotein gap and anemia; FLC ratio is normal; SPEP is pending  -Iron repletion   -Metformin on hold  -Renal indices are back to baseline; Can resume bumex as needed  -Less likely AIN given paucity of WBC on UA and no peripheral eosinophils  -No IVF needed at present  -Strict I&Os

## 2020-10-12 NOTE — PROGRESS NOTE ADULT - PROBLEM SELECTOR PLAN 1
likely multifactorial from multifocal PNA and COPD exacerbation  - CXR: b/l lung infiltrates; emphysema   - CT chest done showing multifocal PNA  - start steroid taper: PO solumedrol 30 mg  - on spiriva, symbicort, montelukast,  inhaler PRN   - ID consulted (Dr. Zhu) recs appreciated, - Blood culture NGTD; on ceftriaxone 1gm daily   - Pulm consulted (Dr. Rojas): continue steroid taper; continue abx   - currently on 4L NC (from 5L) with BIPAP maintain O2 >/ 88, will slowly titrate down to goal of 3L which pt requires at home. goal sat >88 percent  - legionella neg  -sputum cx: few gram positive cocci   - TTE (10/6/20) LVEF of 55% - no vegetations appreciated   - pulm Dr. Rojas consulted, recs appreciated: trial atrovent hhn helped, add mucinex  - apprec palliative care support in management due to complexity of care and nature of history likely multifactorial from multifocal PNA and COPD exacerbation  - CXR: b/l lung infiltrates; emphysema   - CT chest done showing multifocal PNA  - start steroid taper: PO solumedrol 30 mg  - sore throat: biotene as needed   - on spiriva, symbicort, montelukast,  inhaler PRN   - ID consulted (Dr. Zhu) recs appreciated, - Blood culture NGTD; on ceftriaxone 1gm daily   - Pulm consulted (Dr. Rojas): continue steroid taper; continue abx   - currently on 4L NC (from 5L) with BIPAP maintain O2 >/ 88, will slowly titrate down to goal of 3L which pt requires at home. goal sat >88 percent  - legionella neg  -sputum cx: few gram positive cocci   - TTE (10/6/20) LVEF of 55% - no vegetations appreciated   - pulm Dr. Rojas consulted, recs appreciated: trial atrovent hhn helped, add mucinex  - apprec palliative care support in management due to complexity of care and nature of history likely multifactorial from multifocal PNA and COPD exacerbation  - clinically improving  - currently on 4L NC (from 5L) with BIPAP qhs   - maintain O2 >/ 88, will slowly titrate down to goal of 3L which pt requires at home. goal sat >88 percent  - Continue spiriva, symbicort, montelukast,  inhaler PRN   - Pulm consulted (Dr. Rojas): continue steroid taper; continue abx, trial atrovent hhn helped, add mucinex  - ID consulted (Dr. Zhu) recs appreciated  - Blood and sputum culture negative  - continue ceftriaxone 1gm daily  - apprec palliative care support in management due to complexity of care and nature of history  - sore throat: biotene added PRN likely multifactorial from multifocal PNA and COPD exacerbation  - clinically improving  - currently on 4L NC (from 5L) with BIPAP qhs   - maintain O2 >/ 88, will slowly titrate down to goal of 3L which pt requires at home. goal sat >88 percent  - Continue spiriva, symbicort, montelukast,  inhaler PRN   - Pulm consulted (Dr. Rojas): continue steroid taper; continue abx, trial atrovent hhn helped, add mucinex  - ID consulted (Dr. Zhu) recs appreciated  - Blood and sputum culture negative  - continue ceftriaxone and azithromycin  - apprec palliative care support in management due to complexity of care and nature of history  - sore throat: biotene added PRN likely multifactorial from multifocal PNA and COPD exacerbation  - clinically improving  - currently on 4L NC (from 5L) with BIPAP qhs   - maintain O2 >/ 88, will slowly titrate down to goal of 3L which pt requires at home. goal sat >88 percent  - Continue spiriva, symbicort, montelukast,  inhaler PRN   - Pulm consulted (Dr. Rojas): continue steroid taper; continue abx, trial atrovent hhn helped, add mucinex  - ID, Dr. Juarez, note reviewed -- recommended DC azithromycin and continue ceftriaxone for 7 days total  - will continue azithromycin as it is her home medication  - continue ceftriaxone for 7 days (last day 10/15)  - Blood and sputum culture negative  - apprec palliative care support in management due to complexity of care and nature of history  - sore throat: biotene added PRN

## 2020-10-12 NOTE — PROGRESS NOTE ADULT - ASSESSMENT
60F w/ end stage COPD on 3LNC (trying to get on  transplant list at [facility]), former smoker, CAD s/p stent (2016), afib on eliquis, uncontrolled DM2 (on insulin), raynaud phenomenon and recent hospitalization at [facility] for confusion and AURORA p/w sob, admitted for acute on chronic resp failure with hypoxia and hyercarbia sec to multifocal PNA and COPD exacerbation with end stage COPD.     1) Acute on chronic respiratory failure with hypoxia and hypercapnia.  Plan: likely multifactorial from multifocal PNA and COPD exacerbation/End stage COPD/stage 1 diastolic dysfunction CHF (EF 55%)  2) Failure to thrive  3) Goals of care/advance care planning  - Palliative performance scale score: 40% (poor)  - Prognosis: days-weeks (pt is hospice eligible)   - Code status: full code (however pt expressed wishes to not be intubated; due to severe anxiety writer is having further goals of care discussion in a stepwise approach)   - GOC:  continue antibiotics and aggressive medical management. Continue Bipap. Pt would like to pursue lung trabsplant however slowly is dawning on her that likelihood is likely minimal.    - Surrogate:   - Psychosocial support provided    4) Dyspnea - multifactorial  5) Anxiety - now exacerbated by worsening dyspnea  - start fentanyl patch 12 mcg q72h   - continue oxycodone prn  - could benefit from starting SSRI    Appreciate consult. Discussed with the primary team.    Suad So MD

## 2020-10-12 NOTE — PROGRESS NOTE ADULT - PROBLEM SELECTOR PLAN 2
on 3LNC at rehab, currently requiring 4L nc with BIPAP, wean as tolerated  -on spiriva, symbicort, montelukast, inhaler PRN at rehab, continue  -theophylline held given tachycardia and brief afib  - BIPAP qhs and PRN  -of note pt is not on transplant list yet, is planning to go on list at [facility] if medically stable and improved.

## 2020-10-12 NOTE — PROGRESS NOTE ADULT - ASSESSMENT
60F w/ end stage COPD on 3LNC (trying to get on  transplant list at [facility]), former smoker, CAD s/p stent (2016), afib on eliquis, uncontrolled DM2 (on insulin), raynaud phenomenon and recent hospitalization at [facility] for confusion and AURORA p/w sob, admitted for acute on chronic resp failure with hypoxia and hyercarbia sec to multifocal PNA and COPD exacerabtion with end stage COPD.

## 2020-10-13 LAB
ANION GAP SERPL CALC-SCNC: 5 MMOL/L — SIGNIFICANT CHANGE UP (ref 5–17)
BASOPHILS # BLD AUTO: 0.02 K/UL — SIGNIFICANT CHANGE UP (ref 0–0.2)
BASOPHILS NFR BLD AUTO: 0.1 % — SIGNIFICANT CHANGE UP (ref 0–2)
BUN SERPL-MCNC: 32 MG/DL — HIGH (ref 7–23)
CALCIUM SERPL-MCNC: 8.9 MG/DL — SIGNIFICANT CHANGE UP (ref 8.5–10.1)
CHLORIDE SERPL-SCNC: 100 MMOL/L — SIGNIFICANT CHANGE UP (ref 96–108)
CO2 SERPL-SCNC: 35 MMOL/L — HIGH (ref 22–31)
CREAT SERPL-MCNC: 0.93 MG/DL — SIGNIFICANT CHANGE UP (ref 0.5–1.3)
EOSINOPHIL # BLD AUTO: 0.63 K/UL — HIGH (ref 0–0.5)
EOSINOPHIL NFR BLD AUTO: 3.9 % — SIGNIFICANT CHANGE UP (ref 0–6)
GLUCOSE SERPL-MCNC: 208 MG/DL — HIGH (ref 70–99)
HCT VFR BLD CALC: 31.6 % — LOW (ref 34.5–45)
HGB BLD-MCNC: 9.2 G/DL — LOW (ref 11.5–15.5)
IMM GRANULOCYTES NFR BLD AUTO: 2.3 % — HIGH (ref 0–1.5)
LYMPHOCYTES # BLD AUTO: 1.01 K/UL — SIGNIFICANT CHANGE UP (ref 1–3.3)
LYMPHOCYTES # BLD AUTO: 6.2 % — LOW (ref 13–44)
MAGNESIUM SERPL-MCNC: 2.2 MG/DL — SIGNIFICANT CHANGE UP (ref 1.6–2.6)
MCHC RBC-ENTMCNC: 24 PG — LOW (ref 27–34)
MCHC RBC-ENTMCNC: 29.1 GM/DL — LOW (ref 32–36)
MCV RBC AUTO: 82.3 FL — SIGNIFICANT CHANGE UP (ref 80–100)
MONOCYTES # BLD AUTO: 1.09 K/UL — HIGH (ref 0–0.9)
MONOCYTES NFR BLD AUTO: 6.7 % — SIGNIFICANT CHANGE UP (ref 2–14)
NEUTROPHILS # BLD AUTO: 13.14 K/UL — HIGH (ref 1.8–7.4)
NEUTROPHILS NFR BLD AUTO: 80.8 % — HIGH (ref 43–77)
NRBC # BLD: 0 /100 WBCS — SIGNIFICANT CHANGE UP (ref 0–0)
PHOSPHATE SERPL-MCNC: 2.9 MG/DL — SIGNIFICANT CHANGE UP (ref 2.5–4.5)
PLATELET # BLD AUTO: 372 K/UL — SIGNIFICANT CHANGE UP (ref 150–400)
POTASSIUM SERPL-MCNC: 4.7 MMOL/L — SIGNIFICANT CHANGE UP (ref 3.5–5.3)
POTASSIUM SERPL-SCNC: 4.7 MMOL/L — SIGNIFICANT CHANGE UP (ref 3.5–5.3)
RBC # BLD: 3.84 M/UL — SIGNIFICANT CHANGE UP (ref 3.8–5.2)
RBC # FLD: 16.6 % — HIGH (ref 10.3–14.5)
SODIUM SERPL-SCNC: 140 MMOL/L — SIGNIFICANT CHANGE UP (ref 135–145)
WBC # BLD: 16.27 K/UL — HIGH (ref 3.8–10.5)
WBC # FLD AUTO: 16.27 K/UL — HIGH (ref 3.8–10.5)

## 2020-10-13 PROCEDURE — 99232 SBSQ HOSP IP/OBS MODERATE 35: CPT

## 2020-10-13 PROCEDURE — 99232 SBSQ HOSP IP/OBS MODERATE 35: CPT | Mod: GC

## 2020-10-13 PROCEDURE — 99233 SBSQ HOSP IP/OBS HIGH 50: CPT

## 2020-10-13 RX ORDER — AZITHROMYCIN 500 MG/1
500 TABLET, FILM COATED ORAL DAILY
Refills: 0 | Status: DISCONTINUED | OUTPATIENT
Start: 2020-10-14 | End: 2020-10-24

## 2020-10-13 RX ADMIN — CEFTRIAXONE 100 MILLIGRAM(S): 500 INJECTION, POWDER, FOR SOLUTION INTRAMUSCULAR; INTRAVENOUS at 17:34

## 2020-10-13 RX ADMIN — Medication 1 TABLET(S): at 11:52

## 2020-10-13 RX ADMIN — SENNA PLUS 2 TABLET(S): 8.6 TABLET ORAL at 22:22

## 2020-10-13 RX ADMIN — Medication 4 UNIT(S): at 12:56

## 2020-10-13 RX ADMIN — MONTELUKAST 10 MILLIGRAM(S): 4 TABLET, CHEWABLE ORAL at 22:22

## 2020-10-13 RX ADMIN — Medication 600 MILLIGRAM(S): at 05:36

## 2020-10-13 RX ADMIN — APIXABAN 5 MILLIGRAM(S): 2.5 TABLET, FILM COATED ORAL at 17:34

## 2020-10-13 RX ADMIN — Medication 500 MICROGRAM(S): at 13:35

## 2020-10-13 RX ADMIN — AZITHROMYCIN 500 MILLIGRAM(S): 500 TABLET, FILM COATED ORAL at 11:52

## 2020-10-13 RX ADMIN — Medication 4 UNIT(S): at 08:34

## 2020-10-13 RX ADMIN — BUDESONIDE AND FORMOTEROL FUMARATE DIHYDRATE 2 PUFF(S): 160; 4.5 AEROSOL RESPIRATORY (INHALATION) at 18:35

## 2020-10-13 RX ADMIN — Medication 500 MICROGRAM(S): at 19:30

## 2020-10-13 RX ADMIN — Medication 30 MILLIGRAM(S): at 05:36

## 2020-10-13 RX ADMIN — Medication 5 MILLILITER(S): at 05:39

## 2020-10-13 RX ADMIN — Medication 2: at 17:46

## 2020-10-13 RX ADMIN — PANTOPRAZOLE SODIUM 40 MILLIGRAM(S): 20 TABLET, DELAYED RELEASE ORAL at 05:36

## 2020-10-13 RX ADMIN — ATORVASTATIN CALCIUM 80 MILLIGRAM(S): 80 TABLET, FILM COATED ORAL at 22:22

## 2020-10-13 RX ADMIN — POLYETHYLENE GLYCOL 3350 17 GRAM(S): 17 POWDER, FOR SOLUTION ORAL at 11:52

## 2020-10-13 RX ADMIN — Medication 5 MILLILITER(S): at 17:46

## 2020-10-13 RX ADMIN — Medication 81 MILLIGRAM(S): at 11:52

## 2020-10-13 RX ADMIN — FENTANYL CITRATE 1 PATCH: 50 INJECTION INTRAVENOUS at 07:30

## 2020-10-13 RX ADMIN — Medication 4: at 08:34

## 2020-10-13 RX ADMIN — Medication 500 MICROGRAM(S): at 07:55

## 2020-10-13 RX ADMIN — TIOTROPIUM BROMIDE 1 CAPSULE(S): 18 CAPSULE ORAL; RESPIRATORY (INHALATION) at 22:22

## 2020-10-13 RX ADMIN — Medication 1000 UNIT(S): at 11:52

## 2020-10-13 RX ADMIN — Medication 3: at 22:32

## 2020-10-13 RX ADMIN — INSULIN GLARGINE 12 UNIT(S): 100 INJECTION, SOLUTION SUBCUTANEOUS at 22:32

## 2020-10-13 RX ADMIN — BUDESONIDE AND FORMOTEROL FUMARATE DIHYDRATE 2 PUFF(S): 160; 4.5 AEROSOL RESPIRATORY (INHALATION) at 05:36

## 2020-10-13 RX ADMIN — Medication 4 UNIT(S): at 17:46

## 2020-10-13 RX ADMIN — Medication 600 MILLIGRAM(S): at 17:34

## 2020-10-13 RX ADMIN — APIXABAN 5 MILLIGRAM(S): 2.5 TABLET, FILM COATED ORAL at 05:36

## 2020-10-13 RX ADMIN — Medication 325 MILLIGRAM(S): at 11:52

## 2020-10-13 RX ADMIN — FENTANYL CITRATE 1 PATCH: 50 INJECTION INTRAVENOUS at 19:11

## 2020-10-13 RX ADMIN — Medication 2: at 12:35

## 2020-10-13 RX ADMIN — Medication 240 MILLIGRAM(S): at 05:36

## 2020-10-13 NOTE — PROGRESS NOTE ADULT - PROBLEM SELECTOR PLAN 3
Resolved, Cr. 0.93 this AM   - likely 2/2 to prerenal vs hypoxemic  - bumetanide held 2/2 to AURORA, will resume (nephro dosing) as needed as per nephro recs  - trend renal indices

## 2020-10-13 NOTE — PROGRESS NOTE ADULT - PROBLEM SELECTOR PLAN 6
recent TTE in 08/2020 showing normal LVEF, stage 1 diastolic dysfunction  - TTE (10/6/20) LVEF of 55%   - bumex held 2/2 to AURORA, will resume (nephro dosing) as needed as per nephro recs  - caution with excessive IVF

## 2020-10-13 NOTE — PROGRESS NOTE ADULT - SUBJECTIVE AND OBJECTIVE BOX
Patient is a 60y old  Female who presents with a chief complaint of COPD exacerbation, PNA (08 Oct 2020 11:37)       HPI:  60F w/ end stage COPD on 3LNC (pending transplant list at [facility]), former smoker, CAD s/p stent (2016), afib on eliquis, uncontrolled DM2 (on insulin), raynaud phenomenon and recent hospitalization at [facility] for confusion and AURORA p/w sob. [facility] Patient states that she has had worsening shortness of breath for past two days. Unable to obtain comprehensive history due to dyspnea. Patient denies fever, chills, sore throat, cough, chest pain, palpitations, abd pain, n/v. Currently feels short of breath even after receiving duonebs and steroids in the ED. Unable to keep mask on during interview and lay back in stretcher due to subjective sob. Patient repeatedly stating that it is too hot in her room and fanning herself with a paper. Per chart, Dr. Mathew (PCP) has chart notes regarding possible hallucinations. Would like her home medications now but otherwise has no acute complaints.  Has not seen nephrologist.  Denies any N/V.  SOB improving.  States she is urinating well.  Denies any urinary retention.  Denies any supplement or NSAID usage.         Getting breathing treatment  No new complaints  Still has SOB and NIELSON    PAST MEDICAL & SURGICAL HISTORY:  H/O Raynaud&#x27;s syndrome    Ascorbic acid deficiency    Iron deficiency anemia    Constipation    GERD without esophagitis    Type 2 diabetes mellitus    HTN (hypertension)    Heart failure    HLD (hyperlipidemia)    History of chronic atrial fibrillation  on Eliquis    End stage COPD  on 3LNC    Atrial fibrillation    Hyperlipemia    Chronic sinusitis    Raynaud phenomenon    HTN (hypertension)    DM (diabetes mellitus)    Claustrophobia    COPD (chronic obstructive pulmonary disease)    History of tonsillectomy    S/P tonsillectomy         FAMILY HISTORY:  FH: myocardial infarction    Family history of CABG    FH: MI (myocardial infarction)    FH: CABG (coronary artery bypass surgery)    NC    Social History:Non smoker    MEDICATIONS  (STANDING):  apixaban 5 milliGRAM(s) Oral every 12 hours  ascorbic acid 500 milliGRAM(s) Oral daily  aspirin  chewable 81 milliGRAM(s) Oral daily  atorvastatin 80 milliGRAM(s) Oral at bedtime  azithromycin  IVPB 500 milliGRAM(s) IV Intermittent every 24 hours  budesonide 160 MICROgram(s)/formoterol 4.5 MICROgram(s) Inhaler 2 Puff(s) Inhalation two times a day  cefepime   IVPB 2000 milliGRAM(s) IV Intermittent every 12 hours  cholecalciferol 1000 Unit(s) Oral daily  dextrose 5%. 1000 milliLiter(s) (50 mL/Hr) IV Continuous <Continuous>  dextrose 50% Injectable 12.5 Gram(s) IV Push once  dextrose 50% Injectable 25 Gram(s) IV Push once  dextrose 50% Injectable 25 Gram(s) IV Push once  ferrous    sulfate 325 milliGRAM(s) Oral daily  insulin glargine Injectable (LANTUS) 10 Unit(s) SubCutaneous at bedtime  insulin lispro (HumaLOG) corrective regimen sliding scale   SubCutaneous three times a day before meals  insulin lispro Injectable (HumaLOG) 3 Unit(s) SubCutaneous three times a day before meals  montelukast 10 milliGRAM(s) Oral at bedtime  multivitamin 1 Tablet(s) Oral daily  pantoprazole    Tablet 40 milliGRAM(s) Oral before breakfast  polyethylene glycol 3350 17 Gram(s) Oral daily  potassium chloride    Tablet ER 40 milliEquivalent(s) Oral every 4 hours  senna 2 Tablet(s) Oral at bedtime  tiotropium 18 MICROgram(s) Capsule 1 Capsule(s) Inhalation daily    MEDICATIONS  (PRN):  acetaminophen   Tablet .. 650 milliGRAM(s) Oral every 6 hours PRN Mild Pain (1 - 3)  bisacodyl Suppository 10 milliGRAM(s) Rectal daily PRN Constipation  dextrose 40% Gel 15 Gram(s) Oral once PRN Blood Glucose LESS THAN 70 milliGRAM(s)/deciliter  glucagon  Injectable 1 milliGRAM(s) IntraMuscular once PRN Glucose LESS THAN 70 milligrams/deciliter  guaiFENesin   Syrup  (Sugar-Free) 100 milliGRAM(s) Oral every 6 hours PRN Cough  lactulose Syrup 15 Gram(s) Oral two times a day PRN constipation  levalbuterol Inhalation 0.63 milliGRAM(s) Inhalation every 4 hours PRN shortness of breath and/or wheezing  oxyCODONE    IR 5 milliGRAM(s) Oral every 6 hours PRN Moderate Pain (4 - 6)   Meds reviewed    Allergies    No Known Allergies    Intolerances    albuterol (Unknown)       REVIEW OF SYSTEMS:    CONSTITUTIONAL:  No weight loss   EYES: No eye pain, visual disturbances, or discharge  ENMT:  No difficulty hearing, tinnitus, vertigo; No sinus or throat pain  NECK: No pain or stiffness  BREASTS: No pain, masses, or nipple discharge  RESPIRATORY: +SOB. +NIELSON  CARDIOVASCULAR: No chest pain, palpitations, dizziness,   GASTROINTESTINAL: No abdominal or epigastric pain. No nausea, vomiting, or hematemesis;  GENITOURINARY: No dysuria, frequency, hematuria, or incontinence  NEUROLOGICAL: No headaches, memory loss, loss of strength, numbness, or tremors  SKIN: no Diffuse erythema, no blisters  LYMPH NODES: No enlarged glands  ENDOCRINE: No heat or cold intolerance; No hair loss  MUSCULOSKELETAL: No joint pain or swelling   PSYCHIATRIC: No depression, anxiety, mood swings, or difficulty sleeping  ALLERGY AND IMMUNOLOGIC: No hives or eczema      ICU Vital Signs Last 24 Hrs  T(C): 36.4 (12 Oct 2020 13:00), Max: 36.7 (12 Oct 2020 05:11)  T(F): 97.6 (12 Oct 2020 13:00), Max: 98 (12 Oct 2020 05:11)  HR: 80 (12 Oct 2020 13:40) (70 - 83)  BP: 155/69 (12 Oct 2020 13:00) (129/77 - 155/69)  BP(mean): --  ABP: --  ABP(mean): --  RR: 18 (12 Oct 2020 13:00) (17 - 22)  SpO2: 95% (12 Oct 2020 13:40) (94% - 100%)          PHYSICAL EXAM:    GENERAL: No distress  HEAD:  Atraumatic, Normocephalic  EYES: EOMI, conjunctiva and sclera clear  ENMT: No drainage from nares, no drainage from pinna  NECK: Supple, neck  veins full  NERVOUS SYSTEM:  Alert & Oriented X3, Good concentration; Motor Strength wnl upper and lower extremities  CHEST/LUNG: Decreased BS, B/L rales, no rhonchi, Mild wheezing  HEART: Regular rate and rhythm; No murmurs, rubs, or gallops  ABDOMEN: Soft, Nontender, Nondistended; Bowel sounds present,  EXTREMITIES: trace Edema  SKIN: No rashes or lesions, pale      LABS:                                    9.2    16.27 )-----------( 372      ( 13 Oct 2020 08:43 )             31.6     10-12    140  |  101  |  33<H>  ----------------------------<  167<H>  4.9   |  36<H>  |  0.94    Ca    9.0      12 Oct 2020 08:50    TPro  6.0  /  Alb  2.3<L>  /  TBili  0.3  /  DBili  x   /  AST  10<L>  /  ALT  14  /  AlkPhos  65  10-12

## 2020-10-13 NOTE — PROGRESS NOTE ADULT - SUBJECTIVE AND OBJECTIVE BOX
Patient is a 60y old  Female who presents with a chief complaint of COPD exacerbation, PNA (13 Oct 2020 09:06)      INTERVAL HPI/OVERNIGHT EVENTS:    MEDICATIONS  (STANDING):  apixaban 5 milliGRAM(s) Oral every 12 hours  aspirin  chewable 81 milliGRAM(s) Oral daily  atorvastatin 80 milliGRAM(s) Oral at bedtime  azithromycin   Tablet 500 milliGRAM(s) Oral daily  Biotene Dry Mouth Oral Rinse 5 milliLiter(s) Swish and Spit two times a day  budesonide 160 MICROgram(s)/formoterol 4.5 MICROgram(s) Inhaler 2 Puff(s) Inhalation two times a day  cefTRIAXone   IVPB 1000 milliGRAM(s) IV Intermittent every 24 hours  cholecalciferol 1000 Unit(s) Oral daily  dextrose 5%. 1000 milliLiter(s) (50 mL/Hr) IV Continuous <Continuous>  dextrose 50% Injectable 25 Gram(s) IV Push once  dextrose 50% Injectable 12.5 Gram(s) IV Push once  dextrose 50% Injectable 25 Gram(s) IV Push once  diltiazem    milliGRAM(s) Oral daily  fentaNYL   Patch  12 MICROgram(s)/Hr 1 Patch Transdermal every 72 hours  ferrous    sulfate 325 milliGRAM(s) Oral daily  guaiFENesin  milliGRAM(s) Oral every 12 hours  insulin glargine Injectable (LANTUS) 12 Unit(s) SubCutaneous at bedtime  insulin lispro (HumaLOG) corrective regimen sliding scale   SubCutaneous at bedtime  insulin lispro (HumaLOG) corrective regimen sliding scale   SubCutaneous three times a day before meals  insulin lispro Injectable (HumaLOG) 4 Unit(s) SubCutaneous three times a day before meals  ipratropium    for Nebulization 500 MICROGram(s) Nebulizer every 6 hours  montelukast 10 milliGRAM(s) Oral at bedtime  multivitamin 1 Tablet(s) Oral daily  pantoprazole    Tablet 40 milliGRAM(s) Oral before breakfast  polyethylene glycol 3350 17 Gram(s) Oral daily  predniSONE   Tablet 30 milliGRAM(s) Oral daily  senna 2 Tablet(s) Oral at bedtime  tiotropium 18 MICROgram(s) Capsule 1 Capsule(s) Inhalation daily    MEDICATIONS  (PRN):  acetaminophen   Tablet .. 650 milliGRAM(s) Oral every 6 hours PRN Mild Pain (1 - 3)  bisacodyl Suppository 10 milliGRAM(s) Rectal daily PRN Constipation  dextrose 40% Gel 15 Gram(s) Oral once PRN Blood Glucose LESS THAN 70 milliGRAM(s)/deciliter  glucagon  Injectable 1 milliGRAM(s) IntraMuscular once PRN Glucose LESS THAN 70 milligrams/deciliter  lactulose Syrup 15 Gram(s) Oral two times a day PRN constipation  oxyCODONE    IR 5 milliGRAM(s) Oral every 6 hours PRN Moderate Pain (4 - 6)      Allergies    No Known Allergies    Intolerances    albuterol (Unknown)      REVIEW OF SYSTEMS:  CONSTITUTIONAL: No fever or chills  HEENT:  No headache, no sore throat  RESPIRATORY: No cough, wheezing, or shortness of breath  CARDIOVASCULAR: No chest pain, palpitations  GASTROINTESTINAL: No abd pain, nausea, vomiting, or diarrhea  GENITOURINARY: No dysuria, frequency, or hematuria  NEUROLOGICAL: no focal weakness or dizziness  MUSCULOSKELETAL: no myalgias     Vital Signs Last 24 Hrs  T(C): 36.7 (13 Oct 2020 05:30), Max: 36.8 (12 Oct 2020 22:38)  T(F): 98 (13 Oct 2020 05:30), Max: 98.2 (12 Oct 2020 22:38)  HR: 84 (13 Oct 2020 08:09) (80 - 89)  BP: 146/83 (13 Oct 2020 05:30) (134/73 - 155/69)  BP(mean): --  RR: 18 (13 Oct 2020 05:30) (18 - 18)  SpO2: 94% (13 Oct 2020 08:09) (94% - 99%)    PHYSICAL EXAM:  GENERAL: NAD  HEENT:  anicteric, moist mucous membranes  CHEST/LUNG:  CTA b/l, no rales, wheezes, or rhonchi  HEART:  RRR, S1, S2  ABDOMEN:  BS+, soft, nontender, nondistended  EXTREMITIES: no edema, cyanosis, or calf tenderness  NERVOUS SYSTEM: answers questions and follows commands appropriately    LABS:                        9.2    16.27 )-----------( 372      ( 13 Oct 2020 08:43 )             31.6     CBC Full  -  ( 13 Oct 2020 08:43 )  WBC Count : 16.27 K/uL  Hemoglobin : 9.2 g/dL  Hematocrit : 31.6 %  Platelet Count - Automated : 372 K/uL  Mean Cell Volume : 82.3 fl  Mean Cell Hemoglobin : 24.0 pg  Mean Cell Hemoglobin Concentration : 29.1 gm/dL  Auto Neutrophil # : 13.14 K/uL  Auto Lymphocyte # : 1.01 K/uL  Auto Monocyte # : 1.09 K/uL  Auto Eosinophil # : 0.63 K/uL  Auto Basophil # : 0.02 K/uL  Auto Neutrophil % : 80.8 %  Auto Lymphocyte % : 6.2 %  Auto Monocyte % : 6.7 %  Auto Eosinophil % : 3.9 %  Auto Basophil % : 0.1 %    13 Oct 2020 08:43    140    |  100    |  32     ----------------------------<  208    4.7     |  35     |  0.93     Ca    8.9        13 Oct 2020 08:43  Phos  2.9       13 Oct 2020 08:43  Mg     2.2       13 Oct 2020 08:43          CAPILLARY BLOOD GLUCOSE      POCT Blood Glucose.: 215 mg/dL (13 Oct 2020 08:18)  POCT Blood Glucose.: 216 mg/dL (12 Oct 2020 21:43)  POCT Blood Glucose.: 293 mg/dL (12 Oct 2020 17:11)  POCT Blood Glucose.: 324 mg/dL (12 Oct 2020 12:25)        Culture - Sputum (collected 10-12-20 @ 16:44)  Source: .Sputum Sputum  Gram Stain (10-12-20 @ 19:22):    No polymorphonuclear leukocytes per low power field    Rare Squamous epithelial cells per low power field    Few Gram Negative Rods per oil power field    Rare Gram positive cocci in pairs per oil power field    Culture - Sputum (collected 10-09-20 @ 06:28)  Source: .Sputum Sputum  Gram Stain (10-09-20 @ 13:47):    No polymorphonuclear leukocytes per low power field    Few Squamous epithelial cells per low power field    Few Gram Positive Cocci in Clusters per oil power field  Final Report (10-11-20 @ 08:47):    Normal Respiratory Samaria present        RADIOLOGY & ADDITIONAL TESTS:    Personally reviewed.     Consultant(s) Notes Reviewed:  [x] YES  [ ] NO     Patient is a 60y old  Female who presents with a chief complaint of COPD exacerbation, PNA (13 Oct 2020 09:06)      INTERVAL HPI/OVERNIGHT EVENTS: No acute overnight events. Pt seen and examined at bedside. Patient notes mild short of breath, but slightly improved. Currently on 4L O2 saturating well. Has a hoarse voice from coughing up yellow phlegm. Denies fevers, chills, chest pain, abdominal pain, N/V/D/C.       MEDICATIONS  (STANDING):  apixaban 5 milliGRAM(s) Oral every 12 hours  aspirin  chewable 81 milliGRAM(s) Oral daily  atorvastatin 80 milliGRAM(s) Oral at bedtime  azithromycin   Tablet 500 milliGRAM(s) Oral daily  Biotene Dry Mouth Oral Rinse 5 milliLiter(s) Swish and Spit two times a day  budesonide 160 MICROgram(s)/formoterol 4.5 MICROgram(s) Inhaler 2 Puff(s) Inhalation two times a day  cefTRIAXone   IVPB 1000 milliGRAM(s) IV Intermittent every 24 hours  cholecalciferol 1000 Unit(s) Oral daily  dextrose 5%. 1000 milliLiter(s) (50 mL/Hr) IV Continuous <Continuous>  dextrose 50% Injectable 25 Gram(s) IV Push once  dextrose 50% Injectable 12.5 Gram(s) IV Push once  dextrose 50% Injectable 25 Gram(s) IV Push once  diltiazem    milliGRAM(s) Oral daily  fentaNYL   Patch  12 MICROgram(s)/Hr 1 Patch Transdermal every 72 hours  ferrous    sulfate 325 milliGRAM(s) Oral daily  guaiFENesin  milliGRAM(s) Oral every 12 hours  insulin glargine Injectable (LANTUS) 12 Unit(s) SubCutaneous at bedtime  insulin lispro (HumaLOG) corrective regimen sliding scale   SubCutaneous at bedtime  insulin lispro (HumaLOG) corrective regimen sliding scale   SubCutaneous three times a day before meals  insulin lispro Injectable (HumaLOG) 4 Unit(s) SubCutaneous three times a day before meals  ipratropium    for Nebulization 500 MICROGram(s) Nebulizer every 6 hours  montelukast 10 milliGRAM(s) Oral at bedtime  multivitamin 1 Tablet(s) Oral daily  pantoprazole    Tablet 40 milliGRAM(s) Oral before breakfast  polyethylene glycol 3350 17 Gram(s) Oral daily  predniSONE   Tablet 30 milliGRAM(s) Oral daily  senna 2 Tablet(s) Oral at bedtime  tiotropium 18 MICROgram(s) Capsule 1 Capsule(s) Inhalation daily    MEDICATIONS  (PRN):  acetaminophen   Tablet .. 650 milliGRAM(s) Oral every 6 hours PRN Mild Pain (1 - 3)  bisacodyl Suppository 10 milliGRAM(s) Rectal daily PRN Constipation  dextrose 40% Gel 15 Gram(s) Oral once PRN Blood Glucose LESS THAN 70 milliGRAM(s)/deciliter  glucagon  Injectable 1 milliGRAM(s) IntraMuscular once PRN Glucose LESS THAN 70 milligrams/deciliter  lactulose Syrup 15 Gram(s) Oral two times a day PRN constipation  oxyCODONE    IR 5 milliGRAM(s) Oral every 6 hours PRN Moderate Pain (4 - 6)      Allergies    No Known Allergies    Intolerances    albuterol (Unknown)      REVIEW OF SYSTEMS:  CONSTITUTIONAL: No fever or chills  HEENT:  No headache, no sore throat  RESPIRATORY: +cough, +shortness of breath; no wheezing  CARDIOVASCULAR: No chest pain, palpitations  GASTROINTESTINAL: No abd pain, nausea, vomiting, or diarrhea  GENITOURINARY: No dysuria, frequency, or hematuria  NEUROLOGICAL: no focal weakness or dizziness  MUSCULOSKELETAL: no myalgias     Vital Signs Last 24 Hrs  T(C): 36.7 (13 Oct 2020 05:30), Max: 36.8 (12 Oct 2020 22:38)  T(F): 98 (13 Oct 2020 05:30), Max: 98.2 (12 Oct 2020 22:38)  HR: 84 (13 Oct 2020 08:09) (80 - 89)  BP: 146/83 (13 Oct 2020 05:30) (134/73 - 155/69)  BP(mean): --  RR: 18 (13 Oct 2020 05:30) (18 - 18)  SpO2: 94% (13 Oct 2020 08:09) (94% - 99%)    PHYSICAL EXAM:  GENERAL: NAD   HEENT:  anicteric, EOMI b/l; mild posterior pharyngeal erythema no exudates   CHEST/LUNG:  decreased breath sounds b/l; no rales, wheezes, or rhonchi  HEART:  RRR, S1, S2; no murmurs, rubs or gallops   ABDOMEN:  BS+, soft, nontender, nondistended  EXTREMITIES: no edema, cyanosis, or calf tenderness  NERVOUS SYSTEM: answers questions and follows commands appropriately    LABS:                        9.2    16.27 )-----------( 372      ( 13 Oct 2020 08:43 )             31.6     CBC Full  -  ( 13 Oct 2020 08:43 )  WBC Count : 16.27 K/uL  Hemoglobin : 9.2 g/dL  Hematocrit : 31.6 %  Platelet Count - Automated : 372 K/uL  Mean Cell Volume : 82.3 fl  Mean Cell Hemoglobin : 24.0 pg  Mean Cell Hemoglobin Concentration : 29.1 gm/dL  Auto Neutrophil # : 13.14 K/uL  Auto Lymphocyte # : 1.01 K/uL  Auto Monocyte # : 1.09 K/uL  Auto Eosinophil # : 0.63 K/uL  Auto Basophil # : 0.02 K/uL  Auto Neutrophil % : 80.8 %  Auto Lymphocyte % : 6.2 %  Auto Monocyte % : 6.7 %  Auto Eosinophil % : 3.9 %  Auto Basophil % : 0.1 %    13 Oct 2020 08:43    140    |  100    |  32     ----------------------------<  208    4.7     |  35     |  0.93     Ca    8.9        13 Oct 2020 08:43  Phos  2.9       13 Oct 2020 08:43  Mg     2.2       13 Oct 2020 08:43          CAPILLARY BLOOD GLUCOSE      POCT Blood Glucose.: 215 mg/dL (13 Oct 2020 08:18)  POCT Blood Glucose.: 216 mg/dL (12 Oct 2020 21:43)  POCT Blood Glucose.: 293 mg/dL (12 Oct 2020 17:11)  POCT Blood Glucose.: 324 mg/dL (12 Oct 2020 12:25)        Culture - Sputum (collected 10-12-20 @ 16:44)  Source: .Sputum Sputum  Gram Stain (10-12-20 @ 19:22):    No polymorphonuclear leukocytes per low power field    Rare Squamous epithelial cells per low power field    Few Gram Negative Rods per oil power field    Rare Gram positive cocci in pairs per oil power field    Culture - Sputum (collected 10-09-20 @ 06:28)  Source: .Sputum Sputum  Gram Stain (10-09-20 @ 13:47):    No polymorphonuclear leukocytes per low power field    Few Squamous epithelial cells per low power field    Few Gram Positive Cocci in Clusters per oil power field  Final Report (10-11-20 @ 08:47):    Normal Respiratory Samaria present        RADIOLOGY & ADDITIONAL TESTS:    Personally reviewed.     Consultant(s) Notes Reviewed:  [x] YES  [ ] NO       Patient is a 60y old  Female who presents with a chief complaint of COPD exacerbation, PNA (13 Oct 2020 09:06)      INTERVAL HPI/OVERNIGHT EVENTS: No acute overnight events. Pt seen and examined at bedside. Patient notes mild short of breath, but slightly improved. Currently on 4L O2 saturating well. Has a hoarse voice from coughing up yellow phlegm. Denies fevers, chills, chest pain, abdominal pain, N/V/D/C.       MEDICATIONS  (STANDING):  apixaban 5 milliGRAM(s) Oral every 12 hours  aspirin  chewable 81 milliGRAM(s) Oral daily  atorvastatin 80 milliGRAM(s) Oral at bedtime  azithromycin   Tablet 500 milliGRAM(s) Oral daily  Biotene Dry Mouth Oral Rinse 5 milliLiter(s) Swish and Spit two times a day  budesonide 160 MICROgram(s)/formoterol 4.5 MICROgram(s) Inhaler 2 Puff(s) Inhalation two times a day  cefTRIAXone   IVPB 1000 milliGRAM(s) IV Intermittent every 24 hours  cholecalciferol 1000 Unit(s) Oral daily  dextrose 5%. 1000 milliLiter(s) (50 mL/Hr) IV Continuous <Continuous>  dextrose 50% Injectable 25 Gram(s) IV Push once  dextrose 50% Injectable 12.5 Gram(s) IV Push once  dextrose 50% Injectable 25 Gram(s) IV Push once  diltiazem    milliGRAM(s) Oral daily  fentaNYL   Patch  12 MICROgram(s)/Hr 1 Patch Transdermal every 72 hours  ferrous    sulfate 325 milliGRAM(s) Oral daily  guaiFENesin  milliGRAM(s) Oral every 12 hours  insulin glargine Injectable (LANTUS) 12 Unit(s) SubCutaneous at bedtime  insulin lispro (HumaLOG) corrective regimen sliding scale   SubCutaneous at bedtime  insulin lispro (HumaLOG) corrective regimen sliding scale   SubCutaneous three times a day before meals  insulin lispro Injectable (HumaLOG) 4 Unit(s) SubCutaneous three times a day before meals  ipratropium    for Nebulization 500 MICROGram(s) Nebulizer every 6 hours  montelukast 10 milliGRAM(s) Oral at bedtime  multivitamin 1 Tablet(s) Oral daily  pantoprazole    Tablet 40 milliGRAM(s) Oral before breakfast  polyethylene glycol 3350 17 Gram(s) Oral daily  predniSONE   Tablet 30 milliGRAM(s) Oral daily  senna 2 Tablet(s) Oral at bedtime  tiotropium 18 MICROgram(s) Capsule 1 Capsule(s) Inhalation daily    MEDICATIONS  (PRN):  acetaminophen   Tablet .. 650 milliGRAM(s) Oral every 6 hours PRN Mild Pain (1 - 3)  bisacodyl Suppository 10 milliGRAM(s) Rectal daily PRN Constipation  dextrose 40% Gel 15 Gram(s) Oral once PRN Blood Glucose LESS THAN 70 milliGRAM(s)/deciliter  glucagon  Injectable 1 milliGRAM(s) IntraMuscular once PRN Glucose LESS THAN 70 milligrams/deciliter  lactulose Syrup 15 Gram(s) Oral two times a day PRN constipation  oxyCODONE    IR 5 milliGRAM(s) Oral every 6 hours PRN Moderate Pain (4 - 6)      Allergies    No Known Allergies    Intolerances    albuterol (Unknown)      REVIEW OF SYSTEMS:  CONSTITUTIONAL: No fever or chills  HEENT:  No headache, no sore throat  RESPIRATORY: +cough, +shortness of breath; no wheezing  CARDIOVASCULAR: No chest pain, palpitations  GASTROINTESTINAL: No abd pain, nausea, vomiting, or diarrhea  GENITOURINARY: No dysuria, frequency, or hematuria  NEUROLOGICAL: no focal weakness or dizziness  MUSCULOSKELETAL: no myalgias     Vital Signs Last 24 Hrs  T(C): 36.7 (13 Oct 2020 05:30), Max: 36.8 (12 Oct 2020 22:38)  T(F): 98 (13 Oct 2020 05:30), Max: 98.2 (12 Oct 2020 22:38)  HR: 84 (13 Oct 2020 08:09) (80 - 89)  BP: 146/83 (13 Oct 2020 05:30) (134/73 - 155/69)  BP(mean): --  RR: 18 (13 Oct 2020 05:30) (18 - 18)  SpO2: 94% (13 Oct 2020 08:09) (94% - 99%)    PHYSICAL EXAM:  GENERAL: NAD, on 4L NC  HEENT:  anicteric, EOMI b/l; mild posterior pharyngeal erythema no exudates   CHEST/LUNG:  decreased breath sounds b/l; no rales, wheezes, or rhonchi  HEART:  RRR, S1, S2; no murmurs, rubs or gallops   ABDOMEN:  BS+, soft, nontender, nondistended  EXTREMITIES: no edema, cyanosis, or calf tenderness  NERVOUS SYSTEM: answers questions and follows commands appropriately    LABS:                        9.2    16.27 )-----------( 372      ( 13 Oct 2020 08:43 )             31.6     CBC Full  -  ( 13 Oct 2020 08:43 )  WBC Count : 16.27 K/uL  Hemoglobin : 9.2 g/dL  Hematocrit : 31.6 %  Platelet Count - Automated : 372 K/uL  Mean Cell Volume : 82.3 fl  Mean Cell Hemoglobin : 24.0 pg  Mean Cell Hemoglobin Concentration : 29.1 gm/dL  Auto Neutrophil # : 13.14 K/uL  Auto Lymphocyte # : 1.01 K/uL  Auto Monocyte # : 1.09 K/uL  Auto Eosinophil # : 0.63 K/uL  Auto Basophil # : 0.02 K/uL  Auto Neutrophil % : 80.8 %  Auto Lymphocyte % : 6.2 %  Auto Monocyte % : 6.7 %  Auto Eosinophil % : 3.9 %  Auto Basophil % : 0.1 %    13 Oct 2020 08:43    140    |  100    |  32     ----------------------------<  208    4.7     |  35     |  0.93     Ca    8.9        13 Oct 2020 08:43  Phos  2.9       13 Oct 2020 08:43  Mg     2.2       13 Oct 2020 08:43          CAPILLARY BLOOD GLUCOSE      POCT Blood Glucose.: 215 mg/dL (13 Oct 2020 08:18)  POCT Blood Glucose.: 216 mg/dL (12 Oct 2020 21:43)  POCT Blood Glucose.: 293 mg/dL (12 Oct 2020 17:11)  POCT Blood Glucose.: 324 mg/dL (12 Oct 2020 12:25)        Culture - Sputum (collected 10-12-20 @ 16:44)  Source: .Sputum Sputum  Gram Stain (10-12-20 @ 19:22):    No polymorphonuclear leukocytes per low power field    Rare Squamous epithelial cells per low power field    Few Gram Negative Rods per oil power field    Rare Gram positive cocci in pairs per oil power field    Culture - Sputum (collected 10-09-20 @ 06:28)  Source: .Sputum Sputum  Gram Stain (10-09-20 @ 13:47):    No polymorphonuclear leukocytes per low power field    Few Squamous epithelial cells per low power field    Few Gram Positive Cocci in Clusters per oil power field  Final Report (10-11-20 @ 08:47):    Normal Respiratory Samaria present        RADIOLOGY & ADDITIONAL TESTS:    Personally reviewed.     Consultant(s) Notes Reviewed:  [x] YES  [ ] NO

## 2020-10-13 NOTE — PROGRESS NOTE ADULT - PROBLEM SELECTOR PLAN 9
on oxycodone and tylenol PRN at rehab for chronic pain, continue  -apprec palliative care collaboration - Fentanyl patch

## 2020-10-13 NOTE — PROGRESS NOTE ADULT - ASSESSMENT
60F w/ end stage COPD on 3LNC (trying to get on  transplant list at [facility]), former smoker, CAD s/p stent (2016), afib on eliquis, uncontrolled DM2 (on insulin), raynaud phenomenon and recent hospitalization at [facility] for confusion and [facility] p/w sob, admitted for acute on chronic resp failure with hypoxia and hyercarbia sec to multifocal PNA and COPD exacerabtion with end stage COPD. 60F w/ end stage COPD on 3LNC (trying to get on  transplant list at [facility]), former smoker, CAD s/p stent (2016), afib on eliquis, DM2 (on insulin), raynaud phenomenon and recent hospitalization at [facility] for confusion and [facility] p/w sob, admitted for acute on chronic resp failure with hypoxia and hyercarbia sec to multifocal PNA and COPD exacerabtion with end stage COPD.

## 2020-10-13 NOTE — PROGRESS NOTE ADULT - ASSESSMENT
Pt. with endstage COPD on transplant list, with acute pneumonia.  Improved clincally.  Complete 7 days of Rocephin.  FU Dr Mathew as outpt.   Can taper steroids.     Trial atrovent hhn which helped.   Inhalers.  PT.

## 2020-10-13 NOTE — PROGRESS NOTE ADULT - ATTENDING COMMENTS
60F w/ end stage COPD on 3LNC (trying to get on  transplant list at [facility]), former smoker, CAD s/p stent (2016), afib on eliquis, DM2 (on insulin), raynaud phenomenon and recent hospitalization at [facility] for confusion and [facility] p/w sob, admitted for acute on chronic resp failure with hypoxia and hyercarbia sec to multifocal PNA and COPD exacerabtion with end stage COPD.      Acute on chronic respiratory failure with hypoxia and hypercapnia.  Plan: likely multifactorial from multifocal PNA and COPD exacerbation  - slow clinical improvement  - currently on 4L NC with BIPAP qhs   - maintain O2 >/ 88, will slowly titrate down to goal of 3L which pt requires at home. goal sat >88 percent  - Continue spiriva, symbicort, montelukast, inhaler PRN, and ceftriaxone (last day 10/18)   - Pulm (Dr. Rojas): continue steroid taper; continue abx, trial atrovent hhn helped, continue mucinex  - ID (Dr. Juarez) - recommended d/c azithromycin and continue ceftriaxone for 10 days total  - Blood and sputum culture negative  - Sputum culture has changed will repeat it as per ID   - apprec palliative care support in management due to complexity of care and nature of history  - biotene PRN for sore throat/hoarseness; Mucinex.

## 2020-10-13 NOTE — PROGRESS NOTE ADULT - SUBJECTIVE AND OBJECTIVE BOX
PULMONARY/CRITICAL CARE      INTERVAL HPI/OVERNIGHT EVENTS:  Unchanged.   Less sob.   60y FemaleHPI:  60F w/ end stage COPD on 3LNC (pending transplant list at [facility]), former smoker, CAD s/p stent (2016), afib on eliquis, uncontrolled DM2 (on insulin), raynaud phenomenon and recent hospitalization at [facility] for confusion and [facility] sob[facility] Patient states that she has had worsening shortness of breath for past two days. Unable to obtain comprehensive history due to dyspnea. Patient denies fever, chills, sore throat, cough, chest pain, palpitations, abd pain, n/v. Currently feels short of breath even after receiving duonebs and steroids in the ED. Unable to keep mask on during interview and lay back in stretcher due to subjective sob. Patient repeatedly stating that it is too hot in her room and fanning herself with a paper. Per chart, Dr. Mathew (PCP) has chart notes regarding possible hallucinations. Would like her home medications now but otherwise has no acute complaints.    In the ED, VS T97.7F  /78 RR 21 SpO2 94% on 4LNC. Labs significant for mild leukocytosis of 11.41, H/H 9.9/30.5, thrombophilia of 435. CO2 35, albumin 2.4.   CXR done showing b/l patchy infiltrates, official read pending  CT chest done showing multifocal PNA and reactive mediastinal lymphadenopathy  EKG read limited by artifact, sinus tachycardia with RBBB and premature supraventricular complexes (06 Oct 2020 04:55)        PAST MEDICAL & SURGICAL HISTORY:  Ascorbic acid deficiency    Iron deficiency anemia    Constipation    GERD without esophagitis    Type 2 diabetes mellitus    HTN (hypertension)    Heart failure    End stage COPD  on 3LNC    Atrial fibrillation    Hyperlipemia    Chronic sinusitis    Raynaud phenomenon    Claustrophobia    COPD (chronic obstructive pulmonary disease)    S/P tonsillectomy          ICU Vital Signs Last 24 Hrs  T(C): 36.7 (12 Oct 2020 05:11), Max: 36.8 (11 Oct 2020 13:06)  T(F): 98 (12 Oct 2020 05:11), Max: 98.3 (11 Oct 2020 13:06)  HR: 70 (12 Oct 2020 08:09) (70 - 90)  BP: 138/78 (12 Oct 2020 05:11) (121/73 - 138/78)  BP(mean): --  ABP: --  ABP(mean): --  RR: 17 (12 Oct 2020 05:11) (17 - 22)  SpO2: 99% (12 Oct 2020 08:09) (94% - 100%)    Qtts:     I&O's Summary    11 Oct 2020 07:01  -  12 Oct 2020 07:00  --------------------------------------------------------  IN: 50 mL / OUT: 800 mL / NET: -750 mL              PHYSICAL EXAM:    GENERAL: NAD, well-groomed, well-developed, NAD  HEAD:  Atraumatic, Normocephalic  EYES: EOMI, PERRLA, conjunctiva and sclera clear  ENMT: No tonsillar erythema, exudates, or enlargement; Moist mucous membranes, Good dentition, No lesions  NECK: Supple, No JVD, Normal thyroid  NERVOUS SYSTEM:  Alert & Oriented X3, Good concentration; Motor Strength 5/5 B/L upper and lower extremities  CHEST/LUNG: Clear to percussion bilaterally; No rales, rhonchi, wheezing, or rubs Decreased bs  HEART: Regular rate and rhythm; No murmurs, rubs, or gallops  ABDOMEN: Soft, Nontender, Nondistended; Bowel sounds present  EXTREMITIES:  2+ Peripheral Pulses, No clubbing, cyanosis, or edema  LYMPH: No lymphadenopathy noted  SKIN: No rashes or lesions        LABS:                        8.9    10.88 )-----------( 483      ( 11 Oct 2020 07:04 )             30.4     10-11    139  |  99  |  36<H>  ----------------------------<  317<H>  4.4   |  34<H>  |  0.94    Ca    9.2      11 Oct 2020 07:04    TPro  6.3  /  Alb  2.4<L>  /  TBili  0.2  /  DBili  x   /  AST  12<L>  /  ALT  16  /  AlkPhos  70  10-11          vanco through     RADIOLOGY & ADDITIONAL STUDIES:      CRITICAL CARE TIME SPENT:

## 2020-10-13 NOTE — PROGRESS NOTE ADULT - SUBJECTIVE AND OBJECTIVE BOX
[facility] -- Adriana Gonzales, Gina, Funmilayo, Dylan Cormier Savella, Goodger  Office # [phone]    Follow Up:  SOB    Subjective/Observations: Seen and evaluated, NC in use.  Admits to be breathing better but still c/o dry cough.  Denies chest pain or palpitations    REVIEW OF SYSTEMS: All other review of systems is negative unless indicated above  PAST MEDICAL & SURGICAL HISTORY:  Ascorbic acid deficiency    Iron deficiency anemia    Constipation    GERD without esophagitis    Type 2 diabetes mellitus    HTN (hypertension)    Heart failure    End stage COPD  on 3LNC    Atrial fibrillation    Hyperlipemia    Chronic sinusitis    Raynaud phenomenon    Claustrophobia    COPD (chronic obstructive pulmonary disease)    S/P tonsillectomy    MEDICATIONS  (STANDING):  apixaban 5 milliGRAM(s) Oral every 12 hours  aspirin  chewable 81 milliGRAM(s) Oral daily  atorvastatin 80 milliGRAM(s) Oral at bedtime  azithromycin   Tablet 500 milliGRAM(s) Oral daily  Biotene Dry Mouth Oral Rinse 5 milliLiter(s) Swish and Spit two times a day  budesonide 160 MICROgram(s)/formoterol 4.5 MICROgram(s) Inhaler 2 Puff(s) Inhalation two times a day  cefTRIAXone   IVPB 1000 milliGRAM(s) IV Intermittent every 24 hours  cholecalciferol 1000 Unit(s) Oral daily  dextrose 5%. 1000 milliLiter(s) (50 mL/Hr) IV Continuous <Continuous>  dextrose 50% Injectable 12.5 Gram(s) IV Push once  dextrose 50% Injectable 25 Gram(s) IV Push once  dextrose 50% Injectable 25 Gram(s) IV Push once  diltiazem    milliGRAM(s) Oral daily  fentaNYL   Patch  12 MICROgram(s)/Hr 1 Patch Transdermal every 72 hours  ferrous    sulfate 325 milliGRAM(s) Oral daily  guaiFENesin  milliGRAM(s) Oral every 12 hours  insulin glargine Injectable (LANTUS) 12 Unit(s) SubCutaneous at bedtime  insulin lispro (HumaLOG) corrective regimen sliding scale   SubCutaneous at bedtime  insulin lispro (HumaLOG) corrective regimen sliding scale   SubCutaneous three times a day before meals  insulin lispro Injectable (HumaLOG) 4 Unit(s) SubCutaneous three times a day before meals  ipratropium    for Nebulization 500 MICROGram(s) Nebulizer every 6 hours  montelukast 10 milliGRAM(s) Oral at bedtime  multivitamin 1 Tablet(s) Oral daily  pantoprazole    Tablet 40 milliGRAM(s) Oral before breakfast  polyethylene glycol 3350 17 Gram(s) Oral daily  predniSONE   Tablet 30 milliGRAM(s) Oral daily  senna 2 Tablet(s) Oral at bedtime  tiotropium 18 MICROgram(s) Capsule 1 Capsule(s) Inhalation daily    MEDICATIONS  (PRN):  acetaminophen   Tablet .. 650 milliGRAM(s) Oral every 6 hours PRN Mild Pain (1 - 3)  bisacodyl Suppository 10 milliGRAM(s) Rectal daily PRN Constipation  dextrose 40% Gel 15 Gram(s) Oral once PRN Blood Glucose LESS THAN 70 milliGRAM(s)/deciliter  glucagon  Injectable 1 milliGRAM(s) IntraMuscular once PRN Glucose LESS THAN 70 milligrams/deciliter  lactulose Syrup 15 Gram(s) Oral two times a day PRN constipation  oxyCODONE    IR 5 milliGRAM(s) Oral every 6 hours PRN Moderate Pain (4 - 6)    Allergies    No Known Allergies    Intolerances    albuterol (Unknown)    Vital Signs Last 24 Hrs  T(C): 36.7 (13 Oct 2020 05:30), Max: 36.8 (12 Oct 2020 22:38)  T(F): 98 (13 Oct 2020 05:30), Max: 98.2 (12 Oct 2020 22:38)  HR: 84 (13 Oct 2020 08:09) (80 - 89)  BP: 146/83 (13 Oct 2020 05:30) (134/73 - 155/69)  BP(mean): --  RR: 18 (13 Oct 2020 05:30) (18 - 18)  SpO2: 94% (13 Oct 2020 08:09) (94% - 99%)  I&O's Summary    12 Oct 2020 07:01  -  13 Oct 2020 07:00  --------------------------------------------------------  IN: 0 mL / OUT: 600 mL / NET: -600 mL    PHYSICAL EXAM:  TELE: NSR  Constitutional: NAD, awake and alert, obese  HEENT: Moist Mucous Membranes, Anicteric  Pulmonary: Non-labored, breath sounds are very diminished bilaterally, No wheezing, rales or rhonchi + cough, NC  Cardiovascular: Regular, S1 and S2, No murmurs, rubs, gallops or clicks  Gastrointestinal: Bowel Sounds present, soft, nontender.   Lymph: No peripheral edema. No lymphadenopathy.  Skin: No visible rashes or ulcers.  Both feet cool to touch  Psych:  Mood & affect appropriate  LABS: All Labs Reviewed:                        9.2    16.27 )-----------( 372      ( 13 Oct 2020 08:43 )             31.6                         9.2    12.44 )-----------( 382      ( 12 Oct 2020 08:50 )             30.8                         8.9    10.88 )-----------( 483      ( 11 Oct 2020 07:04 )             30.4     12 Oct 2020 08:50    140    |  101    |  33     ----------------------------<  167    4.9     |  36     |  0.94   11 Oct 2020 07:04    139    |  99     |  36     ----------------------------<  317    4.4     |  34     |  0.94     Ca    9.0        12 Oct 2020 08:50  Ca    9.2        11 Oct 2020 07:04    TPro  6.0    /  Alb  2.3    /  TBili  0.3    /  DBili  x      /  AST  10     /  ALT  14     /  AlkPhos  65     12 Oct 2020 08:50  TPro  6.3    /  Alb  2.4    /  TBili  0.2    /  DBili  x      /  AST  12     /  ALT  16     /  AlkPhos  70     11 Oct 2020 07:04       EXAM:  ECHO TTE WO CON COMP W DOPP         PROCEDURE DATE:  10/06/2020        INTERPRETATION:  INDICATION: Rule out infectious endocarditis  Sonographer AS    Blood Pressure 135/74    Height 162.6 cm     Weight 76.2 kg       BSA 1.8 sq m    Dimensions:  LA 3.7       Normal Values: 2.0 - 4.0 cm  Ao 2.7        Normal Values: 2.0 - 3.8 cm  SEPTUM 1.4       Normal Values: 0.6 - 1.2 cm  PWT 1.3       Normal Values: 0.6 - 1.1 cm  LVIDd 3.9         Normal Values: 3.0 - 5.6 cm  LVIDs 2.8         Normal Values: 1.8 - 4.0 cm    OBSERVATIONS:  Technically difficult and limited study  Mitral Valve: Thickened leaflets, mild MR.  Aortic Valve/Aorta: Aortic valve is not well-visualized, grossly normal function without severe pathology  Tricuspid Valve: normal with trace TR.  Pulmonic Valve: Not well-visualized  Left Atrium: normal  Right Atrium: Not well-visualized  Left Ventricle: normal LV size and systolic function, estimated LVEF of 55%. Mild LVH. Endocardium is not well-visualized, cannot rule out segmental dysfunction  Right Ventricle: Grossly normal size and systolic function.  Pericardium/Pleura: normal, no significant pericardial effusion.  Pulmonary/RV Pressure: estimated PA systolic pressure of 15.7 mmHg assuming an RA pressure of 3 mmHg.  LV diastolic dysfunction is present    Conclusion:  Technically difficult and limited study  Mild concentric LVH with grossly normal left ventricular systolic function, estimated LVEF of 55%.  Grossly normal RV size and systolic function.  Aortic valve is not well-visualized, grossly normal function without severe pathology  Mild MR  Trace TR.  No significant pericardial effusion.      CARINA COVARRUBIAS   This document has been electronically signed. Oct  7 2020  8:35AM    EXAM:  XR CHEST PORTABLE IMMED 1V                          PROCEDURE DATE:  10/10/2020      INTERPRETATION:  INDICATION: Pneumonia; follow-up assessment.    PRIORS: 10/8/2020.    VIEWS: Portable AP radiography of the chest performed.    FINDINGS: Heart size appears within normal limits. No hilar or superior mediastinal abnormalities are identified. Infiltrates within the bilateral mid to lower lung fields are without significant change. No pleural effusion or pneumothorax is demonstrated.No mediastinal shift is noted. The visualized osseous structures appear unremarkable.    IMPRESSION: No significant interval change.    GUILLE BOBBY M.D., ATTENDING RADIOLOGIST  This document has been electronically signed. Oct 10 2020  9:57AM    Ventricular Rate 109 BPM    Atrial Rate 109 BPM    P-R Interval 150 ms    QRS Duration 136 ms    Q-T Interval 390 ms    QTC Calculation(Bazett) 525 ms    P Axis 56 degrees    R Axis -16 degrees    T Axis 57 degrees    Diagnosis Line Sinus tachycardia with premature supraventricular complexes  Right bundle branch block  Abnormal ECG  Confirmed by LUIS ENRIQUE CORMIER ([id number]) on 10/8/2020 11:41:04 AM

## 2020-10-13 NOTE — PROGRESS NOTE ADULT - ASSESSMENT
60F w/ end stage COPD on 3LNC (trying to get on  transplant list at [facility]), former smoker, CAD s/p stent (2016), afib on eliquis, uncontrolled DM2 (on insulin), raynaud phenomenon and recent hospitalization at [facility] for confusion and AURORA p/w sob, admitted for acute on chronic resp failure with hypoxia and hyercarbia sec to multifocal PNA and COPD exacerbation with end stage COPD.     1) Acute on chronic respiratory failure with hypoxia and hypercapnia.  Plan: likely multifactorial from multifocal PNA and COPD exacerbation/End stage COPD/stage 1 diastolic dysfunction CHF (EF 55%)  2) Failure to thrive  3) Goals of care/advance care planning  - Palliative performance scale score: 40% (poor)  - Prognosis: days-weeks (pt is hospice eligible)   - Code status: full code (however pt expressed wishes to not be intubated; due to severe anxiety writer is having further goals of care discussion in a stepwise approach)   - GOC:  continue antibiotics and aggressive medical management. Continue Bipap. Pt would like to pursue lung trabsplant however slowly is dawning on her that likelihood is likely minimal.    - Surrogate:   - Psychosocial support provided    4) Dyspnea - multifactorial  5) Anxiety - now exacerbated by worsening dyspnea  - continue fentanyl patch 12 mcg q72h   - continue oxycodone prn  - could benefit from starting SSRI    Appreciate consult. Discussed with the primary team.    Suad So MD   60F w/ end stage COPD on 3LNC (trying to get on  transplant list at [facility]), former smoker, CAD s/p stent (2016), afib on eliquis, uncontrolled DM2 (on insulin), raynaud phenomenon and recent hospitalization at [facility] for confusion and AURORA p/w sob, admitted for acute on chronic resp failure with hypoxia and hyercarbia sec to multifocal PNA and COPD exacerbation with end stage COPD.     1) Acute on chronic respiratory failure with hypoxia and hypercapnia.  Plan: likely multifactorial from multifocal PNA and COPD exacerbation/End stage COPD/stage 1 diastolic dysfunction CHF (EF 55%)  2) Failure to thrive  3) Goals of care/advance care planning  - Palliative performance scale score: 40% (poor)  - Prognosis: days-weeks (pt is hospice eligible)   - Code status: full code (however pt expressed wishes to not be intubated; due to severe anxiety writer is having further goals of care discussion in a stepwise approach)   - GOC:  continue antibiotics and aggressive medical management. Continue Bipap. Pt would like to pursue lung trabsplant however slowly is dawning on her that likelihood is likely minimal.    - Surrogate: pt's mother and brother (will re-discuss appointing HCP; see GOC note 10/7/2020)  - Psychosocial support provided    4) Dyspnea - multifactorial  5) Anxiety - now exacerbated by worsening dyspnea  - continue fentanyl patch 12 mcg q72h   - continue oxycodone prn  - could benefit from starting SSRI (will discuss with pt tomorrow)    Appreciate consult. Discussed with the primary team.    Suad So MD

## 2020-10-13 NOTE — PROGRESS NOTE ADULT - ASSESSMENT
60F w/ end stage COPD on 3LNC (pending transplant list at [facility]), former smoker, CAD s/p stent (2016), Afib on Eliquis,  uncontrolled DM2 (on insulin), raynaud phenomenon and recent hospitalization at [facility] for confusion and AURORA p/w sob, admitted for COPD exacerbation and multifocal PNA    CAD s/p stent   - Continue asa, statin  - Denies ischemic symptoms   - EKG showed SR @ 109, RBBB that is chronic    Paroxysmal Afib  - Remains NSR on tele with controlled rate  - Continue Eliquis 5mg  - Continue Cardizem CD at same dose  - Monitor electrolytes, replete to keep K>4 and Mag>2  - TTE w/ mild concentric LVH grossly nL LVSF ef 55%, mild MR    End stage COPD/Pneumonia  - Pulm following.  Per note, patient is on waiting list for transplant  - CT chest noted  - s/p Abx  - Continue steroids and antibiotics  - Continue NC and BIPAP/CPAP prn/nocturnally  - Initiate incentive spirometer; please reinforce     VTE ppx  - On Eliquis    - All other medical needs as per primary team.  - Other cardiovascular workup will depend on clinical course.  - Will continue to follow.    Jovita Jones DNP, NP-C  Cardiology   Spectra #[phone]

## 2020-10-13 NOTE — PROGRESS NOTE ADULT - PROBLEM SELECTOR PLAN 7
Patient in NSR  - on eliquis 5mg bid and cardizem 240mg qd (home meds)  - hold theophylline and monitor on remote tele, apprec pharmD Dr Ackerman collaboration in therapeutic management and discussion/education with patient  - D/w Cardio (Dr. Whitley) - Cardizem 240 mg qd  -Cardio rec (Dr. Richardson): consider restarting Duoneb; continue eliquis and cardiazem; PT for deconditioning

## 2020-10-13 NOTE — PROGRESS NOTE ADULT - PROBLEM SELECTOR PLAN 1
likely multifactorial from multifocal PNA and COPD exacerbation  - clinically improving  - currently on 4L NC with BIPAP qhs   - maintain O2 >/ 88, will slowly titrate down to goal of 3L which pt requires at home. goal sat >88 percent  - Continue spiriva, symbicort, montelukast, inhaler PRN, and ceftriaxone (last day 10/15)   - Pulm (Dr. Rojas): continue steroid taper; continue abx, trial atrovent hhn helped, continue mucinex  - ID (Dr. Juarez) - recommended d/c azithromycin and continue ceftriaxone for 7 days total  - Blood and sputum culture negative  - apprec palliative care support in management due to complexity of care and nature of history  - biotene PRN for sore throat/hoarseness; Mucinex likely multifactorial from multifocal PNA and COPD exacerbation  - clinically improving  - currently on 4L NC with BIPAP qhs   - maintain O2 >/ 88, will slowly titrate down to goal of 3L which pt requires at home. goal sat >88 percent  - Continue spiriva, symbicort, montelukast, inhaler PRN, and ceftriaxone (last day 10/18)   - Pulm (Dr. Rojas): continue steroid taper; continue abx, trial atrovent hhn helped, continue mucinex  - ID (Dr. Juarez) - recommended d/c azithromycin and continue ceftriaxone for 10 days total  - Blood and sputum culture negative  - Sputum culture has changed will repeat it as per ID   - apprec palliative care support in management due to complexity of care and nature of history  - biotene PRN for sore throat/hoarseness; Mucinex likely multifactorial from multifocal PNA and COPD exacerbation  - slow clinical improvement  - currently on 4L NC with BIPAP qhs   - maintain O2 >/ 88, will slowly titrate down to goal of 3L which pt requires at home. goal sat >88 percent  - Continue spiriva, symbicort, montelukast, inhaler PRN, and ceftriaxone (last day 10/18)   - Pulm (Dr. Rojas): continue steroid taper; continue abx, trial atrovent hhn helped, continue mucinex  - ID (Dr. Juarez) - recommended d/c azithromycin and continue ceftriaxone for 10 days total  - Blood and sputum culture negative  - Sputum culture has changed will repeat it as per ID   - apprec palliative care support in management due to complexity of care and nature of history  - biotene PRN for sore throat/hoarseness; Mucinex likely multifactorial from multifocal PNA and COPD exacerbation  - slow clinical improvement  - currently on 4L NC with BIPAP qhs   - maintain O2 >/ 88, will slowly titrate down to goal of 3L which pt requires at home. goal sat >88 percent  - Continue spiriva, symbicort, montelukast, inhaler PRN, and ceftriaxone (last day 10/18)   - Pulm (Dr. Rojas): continue steroid taper; continue abx, trial atrovent hhn helped, continue mucinex  - d/w ID, Dr. Juarez--will resume azithromycin as it is her home medication (end stage COPD prophylaxis). Continue rocephin [id number] (last day 10/18)  - Blood and sputum culture negative  - Sputum culture has changed will repeat it as per ID   - apprec palliative care support in management due to complexity of care and nature of history  - biotene PRN for sore throat/hoarseness; Mucinex

## 2020-10-13 NOTE — DIETITIAN INITIAL EVALUATION ADULT. - OTHER INFO
59 y/o female adm with COPD. PMH ascorbic acid deficiency, Fe def anemia, constipation, GERD, Type 2 DM, HTN, HF, end stage COPD on 3 L NC (pending transplant list @ [facility]). Pt upset re: her weight. "I'm gaining a pound a day while I am here." Pt using floor scale at bedside. Pt states that when she left the rehab she weighed 168#. All scales are different, explained to pt. As per pt, it is not from fluid. However 2+ edema noted in EMR. Prednisone is ordered, which may cause weight gain and elevated blood sugar levels.  Pt unhappy and states that they are trying to d/cing her today, however pt having trouble breathing and not ready to go. Pt also told RN same. Verbally reviewed diet order with pt. Pt not drinking too much or using salt. Last BM noted on 10/9 (large). Pt d/c plan is to go home.

## 2020-10-13 NOTE — PROGRESS NOTE ADULT - ASSESSMENT
AURORA on CKD 2  Hyponatremia  Hypokalemia  COPD  Hypercalcemia     -BLCr 1  -AURORA unclear etiology, clinically ATN  -UA - 3-5 WBC, trace ketones, 30 proteins  -FeUrea 34.6%  -UPC 0.44  -Ur osm 454  -Corrected calcium 10.2, with paraprotein gap and anemia; FLC ratio is normal; SPEP is pending  -Iron repletion   -Metformin on hold  -Renal indices are stable at baseline; Can resume bumex as needed, but consider reduced dosing from prior  -Less likely AIN given paucity of WBC on UA and no peripheral eosinophils  -No IVF needed at present  -Strict I&Os  -Pulm follow up noted

## 2020-10-13 NOTE — PROGRESS NOTE ADULT - PROBLEM SELECTOR PLAN 2
on 3LNC at rehab, currently requiring 4L nc with BIPAP, wean as tolerated  -on spiriva, symbicort, montelukast, inhaler PRN at rehab, continue  -theophylline held given tachycardia and brief afib  - BIPAP qhs and PRN  -of note pt is not on transplant list yet, is planning to go on list at [facility] if medically stable and improved

## 2020-10-13 NOTE — PROGRESS NOTE ADULT - SUBJECTIVE AND OBJECTIVE BOX
[facility]  INFECTIOUS DISEASES   =======================================================    [facility]-[id number]  CHERI HARTMAN     Follow up: COPD exacerbation, Pneumonia    No new changes or overnight event.   No fever. Feels the same, has some yellow sputum.     PAST MEDICAL & SURGICAL HISTORY:  H/O Raynaud&#x27;s syndrome  Ascorbic acid deficiency  Iron deficiency anemia  Constipation  GERD without esophagitis  Type 2 diabetes mellitus  HTN (hypertension)  Heart failure  HLD (hyperlipidemia)  History of chronic atrial fibrillation  on Eliquis  End stage COPD  on 3LNC  History of tonsillectomy    Social Hx: former heavy smoker, no ETOH or drugs     FAMILY HISTORY:  FH: myocardial infarction    Family history of CABG    Allergies  No Known Allergies    Antibiotics:  Azithromycin   Ceftriaxone     REVIEW OF SYSTEMS:  CONSTITUTIONAL:  No Fever or chills  HEENT:  No diplopia or blurred vision.  No sore throat or runny nose.  CARDIOVASCULAR:  No chest pain or SOB.  RESPIRATORY:  +cough, severe SOB  GASTROINTESTINAL:  No nausea, vomiting or diarrhea.  GENITOURINARY:  No dysuria, frequency or urgency. No Blood in urine  MUSCULOSKELETAL:  no joint aches, no muscle pain  SKIN:  No change in skin, hair or nails.  NEUROLOGIC:  No paresthesias, fasciculations, seizures or weakness.  PSYCHIATRIC:  No disorder of thought or mood.  ENDOCRINE:  No heat or cold intolerance, polyuria or polydipsia.  HEMATOLOGICAL:  No easy bruising or bleeding.     Physical Exam:  Vital Signs Last 24 Hrs  T(C): 36.7 (13 Oct 2020 05:30), Max: 36.8 (12 Oct 2020 22:38)  T(F): 98 (13 Oct 2020 05:30), Max: 98.2 (12 Oct 2020 22:38)  HR: 84 (13 Oct 2020 08:09) (80 - 89)  BP: 146/83 (13 Oct 2020 05:30) (134/73 - 155/69)  BP(mean): --  RR: 18 (13 Oct 2020 05:30) (18 - 18)  SpO2: 94% (13 Oct 2020 08:09) (94% - 99%)  GEN: NAD  HEENT: normocephalic and atraumatic. EOMI. PERRL.    NECK: Supple.  No lymphadenopathy   LUNGS: very poor air movement but Clear to auscultation with no wheezing or rales .  HEART: Regular rate and rhythm   ABDOMEN: Soft, nontender, and nondistended.  Positive bowel sounds.    : No CVA tenderness  EXTREMITIES: Without any cyanosis, clubbing, rash, lesions or edema.  NEUROLOGIC: grossly intact.  PSYCHIATRIC: Appropriate affect .  SKIN: No ulceration or induration present.      Labs:                        9.2    16.27 )-----------( 372      ( 13 Oct 2020 08:43 )             31.6      10-13    140  |  100  |  32<H>  ----------------------------<  208<H>  4.7   |  35<H>  |  0.93    Ca    8.9      13 Oct 2020 08:43  Phos  2.9     10-13  Mg     2.2     10-13    TPro  6.0  /  Alb  2.3<L>  /  TBili  0.3  /  DBili  x   /  AST  10<L>  /  ALT  14  /  AlkPhos  65  10-12      Culture - Sputum (collected 10-12-20 @ 16:44)  Source: .Sputum Sputum  Gram Stain (10-12-20 @ 19:22):    No polymorphonuclear leukocytes per low power field    Rare Squamous epithelial cells per low power field    Few Gram Negative Rods per oil power field    Rare Gram positive cocci in pairs per oil power field    Culture - Sputum (collected 10-09-20 @ 06:28)  Source: .Sputum Sputum  Gram Stain (10-09-20 @ 13:47):    No polymorphonuclear leukocytes per low power field    Few Squamous epithelial cells per low power field    Few Gram Positive Cocci in Clusters per oil power field  Final Report (10-11-20 @ 08:47):    Normal Respiratory Richie present    Culture - Blood (collected 10-06-20 @ 09:23)  Source: .Blood Blood  Final Report (10-11-20 @ 10:00):    No Growth Final    Culture - Blood (collected 10-06-20 @ 09:23)  Source: .Blood Blood  Final Report (10-11-20 @ 10:00):    No Growth Final    WBC Count: 16.27 K/uL (10-13-20 @ 08:43)  WBC Count: 12.44 K/uL (10-12-20 @ 08:50)  WBC Count: 10.88 K/uL (10-11-20 @ 07:04)  WBC Count: 11.84 K/uL (10-10-20 @ 06:48)  WBC Count: 12.81 K/uL (10-09-20 @ 07:32)    Creatinine, Serum: 0.93 mg/dL (10-13-20 @ 08:43)  Creatinine, Serum: 0.94 mg/dL (10-12-20 @ 08:50)  Creatinine, Serum: 0.94 mg/dL (10-11-20 @ 07:04)  Creatinine, Serum: 1.30 mg/dL (10-10-20 @ 06:48)  Creatinine, Serum: 1.90 mg/dL (10-09-20 @ 07:32)    C-Reactive Protein, Serum: 26.32 mg/dL (10-06-20 @ 09:01)    Ferritin, Serum: 96 ng/mL (10-09-20 @ 12:19)    Procalcitonin, Serum: 0.05 ng/mL (10-10-20 @ 06:48)     COVID-19 PCR: NotDetec (10-06-20 @ 03:23)      Imaging:  < from: Xray Chest 1 View-PORTABLE IMMEDIATE (Xray Chest 1 View-PORTABLE IMMEDIATE .) (10.10.20 @ 09:54) >  EXAM:  XR CHEST PORTABLE IMMED 1V                        PROCEDURE DATE:  10/10/2020    INTERPRETATION:  INDICATION: Pneumonia; follow-up assessment.  PRIORS: 10/8/2020.  VIEWS: Portable AP radiography of the chest performed.  FINDINGS: Heart size appears within normal limits. No hilar or superior mediastinal abnormalities are identified. Infiltrates within the bilateral mid to lower lung fields are without significant change. No pleural effusion or pneumothorax is demonstrated.No mediastinal shift is noted. The visualized osseous structures appear unremarkable.  IMPRESSION: No significant interval change.    Assessment and Plan:   59y/o woman with end stage COPD on 3L O2 at home awaiting transplant at [facility], former smoker, CAD s/p stent, afib, DM2), raynaud phenomenon and recent hospitalization at [facility] for confusion and AUR[facility]A has been sent from [facility] with worsening of SOB. CT with multilobar opacities. Due to recent hospitalization and rehab stay, will cover for possible HCAP. Very high risk with a poor lung capacities.     COPD, Pneumonia  - Blood culture NGTD  - Sputum culture with normal respiratory richie, but since it has changed will repeat it.   - Legionella U ag negative   - CXR and CT with multilobar opacities  - Respiratory and pulmonary consults noted  - TTE, result noted, EF=55%, no pulmonary edema suspected   - Continue ceftriaxone 1gm daily, will complete total 10days.   - Will stop azithromycin completed 5days    Will follow.    Wagner Juarez MD  Division of Infectious Diseases   Cell [phone] between 7am and 5pm   After 5pm and weekends please call ID service at [phone].   .

## 2020-10-13 NOTE — PROGRESS NOTE ADULT - SUBJECTIVE AND OBJECTIVE BOX
SUBJECTIVE AND OBJECTIVE:  INTERVAL HPI/OVERNIGHT EVENTS:    DNR on chart:   Allergies    No Known Allergies    Intolerances    albuterol (Unknown)  MEDICATIONS  (STANDING):  apixaban 5 milliGRAM(s) Oral every 12 hours  aspirin  chewable 81 milliGRAM(s) Oral daily  atorvastatin 80 milliGRAM(s) Oral at bedtime  azithromycin   Tablet 500 milliGRAM(s) Oral daily  Biotene Dry Mouth Oral Rinse 5 milliLiter(s) Swish and Spit two times a day  budesonide 160 MICROgram(s)/formoterol 4.5 MICROgram(s) Inhaler 2 Puff(s) Inhalation two times a day  cefTRIAXone   IVPB 1000 milliGRAM(s) IV Intermittent every 24 hours  cholecalciferol 1000 Unit(s) Oral daily  dextrose 5%. 1000 milliLiter(s) (50 mL/Hr) IV Continuous <Continuous>  dextrose 50% Injectable 12.5 Gram(s) IV Push once  dextrose 50% Injectable 25 Gram(s) IV Push once  dextrose 50% Injectable 25 Gram(s) IV Push once  diltiazem    milliGRAM(s) Oral daily  fentaNYL   Patch  12 MICROgram(s)/Hr 1 Patch Transdermal every 72 hours  ferrous    sulfate 325 milliGRAM(s) Oral daily  guaiFENesin  milliGRAM(s) Oral every 12 hours  insulin glargine Injectable (LANTUS) 12 Unit(s) SubCutaneous at bedtime  insulin lispro (HumaLOG) corrective regimen sliding scale   SubCutaneous three times a day before meals  insulin lispro (HumaLOG) corrective regimen sliding scale   SubCutaneous at bedtime  insulin lispro Injectable (HumaLOG) 4 Unit(s) SubCutaneous three times a day before meals  ipratropium    for Nebulization 500 MICROGram(s) Nebulizer every 6 hours  montelukast 10 milliGRAM(s) Oral at bedtime  multivitamin 1 Tablet(s) Oral daily  pantoprazole    Tablet 40 milliGRAM(s) Oral before breakfast  polyethylene glycol 3350 17 Gram(s) Oral daily  predniSONE   Tablet 30 milliGRAM(s) Oral daily  senna 2 Tablet(s) Oral at bedtime  tiotropium 18 MICROgram(s) Capsule 1 Capsule(s) Inhalation daily    MEDICATIONS  (PRN):  acetaminophen   Tablet .. 650 milliGRAM(s) Oral every 6 hours PRN Mild Pain (1 - 3)  bisacodyl Suppository 10 milliGRAM(s) Rectal daily PRN Constipation  dextrose 40% Gel 15 Gram(s) Oral once PRN Blood Glucose LESS THAN 70 milliGRAM(s)/deciliter  glucagon  Injectable 1 milliGRAM(s) IntraMuscular once PRN Glucose LESS THAN 70 milligrams/deciliter  lactulose Syrup 15 Gram(s) Oral two times a day PRN constipation  oxyCODONE    IR 5 milliGRAM(s) Oral every 6 hours PRN Moderate Pain (4 - 6)      ITEMS UNCHECKED ARE NOT PRESENT    PRESENT SYMPTOMS: [ ]Unable to obtain due to poor mentation   Source if other than patient:  [ ]Family   [ ]Team     Pain:  [ ]yes [ ]no  QOL impact -   Location -                    Aggravating factors -  Quality -  Radiation -  Timing-  Severity (0-10 scale):  Minimal acceptable level (0-10 scale):     Dyspnea:                           [ ]Mild [ ]Moderate [ ]Severe  Anxiety:                             [ ]Mild [ ]Moderate [ ]Severe  Fatigue:                             [ ]Mild [ ]Moderate [ ]Severe  Nausea:                             [ ]Mild [ ]Moderate [ ]Severe  Loss of appetite:              [ ]Mild [ ]Moderate [ ]Severe  Constipation:                    [ ]Mild [ ]Moderate [ ]Severe    CPOT:    https://www.[facility].org/getattachment/[email or web address]/Critical-Care-Pain-Observation-Tool-(CPOT)    PAIN AD Score:	  http://geriatrictoolkit.[facility]/cog/painad.pdf (Ctrl + left click to view)    Other Symptoms:  [ ]All other review of systems negative     Palliative Performance Status Version 2:         %      http://npcrc.org/files/news/palliative_performance_scale_ppsv2.pdf  PHYSICAL EXAM:  Vital Signs Last 24 Hrs  T(C): 36.7 (13 Oct 2020 05:30), Max: 36.8 (12 Oct 2020 22:38)  T(F): 98 (13 Oct 2020 05:30), Max: 98.2 (12 Oct 2020 22:38)  HR: 84 (13 Oct 2020 08:09) (80 - 89)  BP: 146/83 (13 Oct 2020 05:30) (134/73 - 155/69)  BP(mean): --  RR: 18 (13 Oct 2020 05:30) (18 - 18)  SpO2: 94% (13 Oct 2020 08:09) (94% - 99%) I&O's Summary    12 Oct 2020 07:01  -  13 Oct 2020 07:00  --------------------------------------------------------  IN: 0 mL / OUT: 600 mL / NET: -600 mL       GENERAL:  [ ]Alert  [ ]Oriented x   [ ]Lethargic  [ ]Cachexia  [ ]Unarousable  [ ]Verbal  [ ]Non-Verbal  Behavioral:   [ ]Anxiety  [ ]Delirium [ ]Agitation [ ]Other  HEENT:  [ ]Normal   [ ]Dry mouth   [ ]ET Tube/Trach  [ ]Oral lesions  PULMONARY:   [ ]Clear [ ]Tachypnea  [ ]Audible excessive secretions   [ ]Rhonchi        [ ]Right [ ]Left [ ]Bilateral  [ ]Crackles        [ ]Right [ ]Left [ ]Bilateral  [ ]Wheezing     [ ]Right [ ]Left [ ]Bilateral  [ ]Diminished BS [ ] Right [ ]Left [ ]Bilateral  CARDIOVASCULAR:    [ ]Regular [ ]Irregular [ ]Tachy  [ ]Neal [ ]Murmur [ ]Other  GASTROINTESTINAL:  [ ]Soft  [ ]Distended   [ ]+BS  [ ]Non tender [ ]Tender  [ ]PEG [ ]OGT/ NGT   Last BM:      GENITOURINARY:  [ ]Normal [ ]Incontinent   [ ]Oliguria/Anuria   [ ]Rosado  MUSCULOSKELETAL:   [ ]Normal   [ ]Weakness  [ ]Bed/Wheelchair bound [ ]Edema  NEUROLOGIC:   [ ]No focal deficits  [ ] Cognitive impairment  [ ] Dysphagia [ ]Dysarthria [ ] Paresis [ ]Other   SKIN:   [ ]Normal  [ ]Rash   [ ]Pressure ulcer(s) [ ]y [ ]n present on admission    CRITICAL CARE:  [ ]Shock Present  [ ]Septic [ ]Cardiogenic [ ]Neurologic [ ]Hypovolemic  [ ]Vasopressors [ ]Inotropes  [ ]Respiratory failure present [ ]Mechanical Ventilation [ ]Non-invasive ventilatory support [ ]High-Flow   [ ]Acute  [ ]Chronic [ ]Hypoxic  [ ]Hypercarbic [ ]Other  [ ]Other organ failure     LABS:                        9.2    16.27 )-----------( 372      ( 13 Oct 2020 08:43 )             31.6   10-13    140  |  100  |  32<H>  ----------------------------<  208<H>  4.7   |  35<H>  |  0.93    Ca    8.9      13 Oct 2020 08:43  Phos  2.9     10-13  Mg     2.2     10-13    TPro  6.0  /  Alb  2.3<L>  /  TBili  0.3  /  DBili  x   /  AST  10<L>  /  ALT  14  /  AlkPhos  65  10-12        RADIOLOGY & ADDITIONAL STUDIES:    Protein Calorie Malnutrition Present: [ ]mild [ ]moderate [ ]severe [ ]underweight [ ]morbid obesity  https://www.andeal.org/vault/2440/web/files/ONC/Table_Clinical%20Characteristics%20to%20Document%20Malnutrition-White%20JV%20et%20al%169769.pdf    Height (cm): 162.6 (10-06-20 @ 01:24), 162.6 (08-01-20 @ 23:15), 162.56 (12-12-19 @ 08:06)  Weight (kg): 76.2 (10-06-20 @ 01:24), 90.3 (08-01-20 @ 23:15), 84.6 (12-12-19 @ 08:06)  BMI (kg/m2): 28.8 (10-06-20 @ 01:24), 34.2 (08-01-20 @ 23:15), 32 (12-12-19 @ 08:06)    [ ]PPSV2 < or = 30%  [ ]significant weight loss [ ]poor nutritional intake [ ]anasarca   Albumin, Serum: 2.3 g/dL (10-12-20 @ 08:50)   [ ]Artificial Nutrition    REFERRALS:   [ ]Chaplaincy  [ ]Hospice  [ ]Child Life  [ ]Social Work  [ ]Case management [ ]Holistic Therapy     Goals of Care Document:  ALVINA Gonzales (10-07-20 @ 16:36)  Goals of Care Conversation:   Participants:  · Participants  Patient    Advance Directives:  · Does patient have Advance Directive  No  · Caregiver:  yes  · Name  Michele Vega  · Phone Number  [phone]    Conversation Discussion:  · Conversation  AD  · Conversation Details  met pt, after speaking to Wang Hanson CM: pt uses O2 at home, goal to be on lung transplant list, wants HC, not [facility], pt has 2 brothers, transport pt to [facility] offices.  pt with SOB, on O2, pt approached regarding HCP, educated on role of hcp, pt declines to complete hcp or take a copy to think about at home.  pt unsure who will be her hcp, she has 2 brothers, Michele is younger brother.  pt spoke of coming from a long line of ill family, including her mother, she knows all about a hcp and the decisions that need to be made, but declines to discuss further, PC RN contact # given to pt, will follow as needed.    Personal Advance Directives Treatment Guidelines:   Treatment Guidelines:  · Decision Maker  Patient    Location of Discussion:   Duration of Advanced Care Planning Meeting:  · Time spent (in minutes)  25    Location of Discussion:  · Location of discussion  Face to face      Electronic Signatures:  Clementina Gonzales (RN)   (Signed 07-Oct-2020 16:48)  	Authored: Goals of Care Conversation, Personal Advance Directives Treatment Guidelines, Location of Discussion    Last Updated: 07-Oct-2020 20:52 by Suad Pinto)       SUBJECTIVE AND OBJECTIVE/INTERVAL HPI/OVERNIGHT EVENTS:  - no acute events overnight  - SOB moderately improved with Fentanyl. Pt stated that she was able to get out of bed and walk to chair at bedside without extreme SOB. Continues to feel anxious regarding medical condition. Asked writer to end meeting and return tomorrow for further discussions.    DNR on chart:   Allergies    No Known Allergies    Intolerances    albuterol (Unknown)  MEDICATIONS  (STANDING):  apixaban 5 milliGRAM(s) Oral every 12 hours  aspirin  chewable 81 milliGRAM(s) Oral daily  atorvastatin 80 milliGRAM(s) Oral at bedtime  azithromycin   Tablet 500 milliGRAM(s) Oral daily  Biotene Dry Mouth Oral Rinse 5 milliLiter(s) Swish and Spit two times a day  budesonide 160 MICROgram(s)/formoterol 4.5 MICROgram(s) Inhaler 2 Puff(s) Inhalation two times a day  cefTRIAXone   IVPB 1000 milliGRAM(s) IV Intermittent every 24 hours  cholecalciferol 1000 Unit(s) Oral daily  dextrose 5%. 1000 milliLiter(s) (50 mL/Hr) IV Continuous <Continuous>  dextrose 50% Injectable 12.5 Gram(s) IV Push once  dextrose 50% Injectable 25 Gram(s) IV Push once  dextrose 50% Injectable 25 Gram(s) IV Push once  diltiazem    milliGRAM(s) Oral daily  fentaNYL   Patch  12 MICROgram(s)/Hr 1 Patch Transdermal every 72 hours  ferrous    sulfate 325 milliGRAM(s) Oral daily  guaiFENesin  milliGRAM(s) Oral every 12 hours  insulin glargine Injectable (LANTUS) 12 Unit(s) SubCutaneous at bedtime  insulin lispro (HumaLOG) corrective regimen sliding scale   SubCutaneous three times a day before meals  insulin lispro (HumaLOG) corrective regimen sliding scale   SubCutaneous at bedtime  insulin lispro Injectable (HumaLOG) 4 Unit(s) SubCutaneous three times a day before meals  ipratropium    for Nebulization 500 MICROGram(s) Nebulizer every 6 hours  montelukast 10 milliGRAM(s) Oral at bedtime  multivitamin 1 Tablet(s) Oral daily  pantoprazole    Tablet 40 milliGRAM(s) Oral before breakfast  polyethylene glycol 3350 17 Gram(s) Oral daily  predniSONE   Tablet 30 milliGRAM(s) Oral daily  senna 2 Tablet(s) Oral at bedtime  tiotropium 18 MICROgram(s) Capsule 1 Capsule(s) Inhalation daily    MEDICATIONS  (PRN):  acetaminophen   Tablet .. 650 milliGRAM(s) Oral every 6 hours PRN Mild Pain (1 - 3)  bisacodyl Suppository 10 milliGRAM(s) Rectal daily PRN Constipation  dextrose 40% Gel 15 Gram(s) Oral once PRN Blood Glucose LESS THAN 70 milliGRAM(s)/deciliter  glucagon  Injectable 1 milliGRAM(s) IntraMuscular once PRN Glucose LESS THAN 70 milligrams/deciliter  lactulose Syrup 15 Gram(s) Oral two times a day PRN constipation  oxyCODONE    IR 5 milliGRAM(s) Oral every 6 hours PRN Moderate Pain (4 - 6)      ITEMS UNCHECKED ARE NOT PRESENT    PRESENT SYMPTOMS: [ ]Unable to obtain due to poor mentation   Source if other than patient:  [ ]Family   [ ]Team     Pain:  [ ]yes [x ]no  QOL impact -   Location -                    Aggravating factors -  Quality -  Radiation -  Timing-  Severity (0-10 scale):  Minimal acceptable level (0-10 scale):     Dyspnea:                           [ ]Mild [x ]Moderate [ ]Severe  Anxiety:                             [ ]Mild [ x]Moderate [ ]Severe  Fatigue:                             [ ]Mild [ ]Moderate [ ]Severe  Nausea:                             [ ]Mild [ ]Moderate [ ]Severe  Loss of appetite:              [ ]Mild [ ]Moderate [ ]Severe  Constipation:                    [ ]Mild [ ]Moderate [ ]Severe    CPOT:    https://www.[facility].org/getattachment/[email or web address]/Critical-Care-Pain-Observation-Tool-(CPOT)    PAIN AD Score:	  http://geriatrictoolkit.[place].[facility]/cog/painad.pdf (Ctrl + left click to view)    Other Symptoms:  [ x]All other review of systems negative     Palliative Performance Status Version 2:     50    %      http://npcrc.org/files/news/palliative_performance_scale_ppsv2.pdf  PHYSICAL EXAM:  Vital Signs Last 24 Hrs  T(C): 36.7 (13 Oct 2020 05:30), Max: 36.8 (12 Oct 2020 22:38)  T(F): 98 (13 Oct 2020 05:30), Max: 98.2 (12 Oct 2020 22:38)  HR: 84 (13 Oct 2020 08:09) (80 - 89)  BP: 146/83 (13 Oct 2020 05:30) (134/73 - 155/69)  BP(mean): --  RR: 18 (13 Oct 2020 05:30) (18 - 18)  SpO2: 94% (13 Oct 2020 08:09) (94% - 99%) I&O's Summary    12 Oct 2020 07:01  -  13 Oct 2020 07:00  --------------------------------------------------------  IN: 0 mL / OUT: 600 mL / NET: -600 mL      GENERAL: NAD, on 4L NC  HEENT:  anicteric, EOMI b/l; mild posterior pharyngeal erythema no exudates   CHEST/LUNG:  decreased breath sounds b/l; no rales, wheezes, or rhonchi  HEART:  RRR, S1, S2; no murmurs, rubs or gallops   ABDOMEN:  BS+, soft, nontender, nondistended  EXTREMITIES: no edema, cyanosis, or calf tenderness  NERVOUS SYSTEM: answers questions and follows commands appropriately  Psych: flat affect    LABS:                        9.2    16.27 )-----------( 372      ( 13 Oct 2020 08:43 )             31.6   10-13    140  |  100  |  32<H>  ----------------------------<  208<H>  4.7   |  35<H>  |  0.93    Ca    8.9      13 Oct 2020 08:43  Phos  2.9     10-13  Mg     2.2     10-13    TPro  6.0  /  Alb  2.3<L>  /  TBili  0.3  /  DBili  x   /  AST  10<L>  /  ALT  14  /  AlkPhos  65  10-12        RADIOLOGY & ADDITIONAL STUDIES: reviewed    Protein Calorie Malnutrition Present: [ ]mild [ ]moderate [ ]severe [ ]underweight [ ]morbid obesity  https://www.andeal.org/vault/1587/web/files/ONC/Table_Clinical%20Characteristics%20to%20Document%20Malnutrition-White%20JV%20et%20al%202012.pdf    Height (cm): 162.6 (10-06-20 @ 01:24), 162.6 (08-01-20 @ 23:15), 162.56 (12-12-19 @ 08:06)  Weight (kg): 76.2 (10-06-20 @ 01:24), 90.3 (08-01-20 @ 23:15), 84.6 (12-12-19 @ 08:06)  BMI (kg/m2): 28.8 (10-06-20 @ 01:24), 34.2 (08-01-20 @ 23:15), 32 (12-12-19 @ 08:06)    [ ]PPSV2 < or = 30%  [ ]significant weight loss [ ]poor nutritional intake [ ]anasarca   Albumin, Serum: 2.3 g/dL (10-12-20 @ 08:50)   [ ]Artificial Nutrition    REFERRALS:   [ ]Chaplaincy  [ ]Hospice  [ ]Child Life  [ ]Social Work  [ ]Case management [ ]Holistic Therapy     Goals of Care Document:  ALVINA Gonzales (10-07-20 @ 16:36)  Goals of Care Conversation:   Participants:  · Participants  Patient    Advance Directives:  · Does patient have Advance Directive  No  · Caregiver:  yes  · Name  Michele Vega  · Phone Number  [phone]    Conversation Discussion:  · Conversation  AD  · Conversation Details  met pt, after speaking to Wang Hanson CM: pt uses O2 at home, goal to be on lung transplant list, wants HC, not [facility], pt has 2 brothers, transport pt to [facility] offices.  pt with SOB, on O2, pt approached regarding HCP, educated on role of hcp, pt declines to complete hcp or take a copy to think about at home.  pt unsure who will be her hcp, she has 2 brothers, Michele is younger brother.  pt spoke of coming from a long line of ill family, including her mother, she knows all about a hcp and the decisions that need to be made, but declines to discuss further, PC RN contact # given to pt, will follow as needed.    Personal Advance Directives Treatment Guidelines:   Treatment Guidelines:  · Decision Maker  Patient    Location of Discussion:   Duration of Advanced Care Planning Meeting:  · Time spent (in minutes)  25    Location of Discussion:  · Location of discussion  Face to face      Electronic Signatures:  Clementina Gonzales (RN)   (Signed 07-Oct-2020 16:48)  	Authored: Goals of Care Conversation, Personal Advance Directives Treatment Guidelines, Location of Discussion    Last Updated: 07-Oct-2020 20:52 by Suad Pinto)

## 2020-10-14 LAB
ANION GAP SERPL CALC-SCNC: 6 MMOL/L — SIGNIFICANT CHANGE UP (ref 5–17)
BUN SERPL-MCNC: 25 MG/DL — HIGH (ref 7–23)
CALCIUM SERPL-MCNC: 9.1 MG/DL — SIGNIFICANT CHANGE UP (ref 8.5–10.1)
CHLORIDE SERPL-SCNC: 102 MMOL/L — SIGNIFICANT CHANGE UP (ref 96–108)
CO2 SERPL-SCNC: 31 MMOL/L — SIGNIFICANT CHANGE UP (ref 22–31)
CREAT SERPL-MCNC: 0.87 MG/DL — SIGNIFICANT CHANGE UP (ref 0.5–1.3)
GLUCOSE SERPL-MCNC: 141 MG/DL — HIGH (ref 70–99)
HCT VFR BLD CALC: 34.6 % — SIGNIFICANT CHANGE UP (ref 34.5–45)
HGB BLD-MCNC: 10.1 G/DL — LOW (ref 11.5–15.5)
MAGNESIUM SERPL-MCNC: 2.2 MG/DL — SIGNIFICANT CHANGE UP (ref 1.6–2.6)
MCHC RBC-ENTMCNC: 24.2 PG — LOW (ref 27–34)
MCHC RBC-ENTMCNC: 29.2 GM/DL — LOW (ref 32–36)
MCV RBC AUTO: 83 FL — SIGNIFICANT CHANGE UP (ref 80–100)
NRBC # BLD: 0 /100 WBCS — SIGNIFICANT CHANGE UP (ref 0–0)
PHOSPHATE SERPL-MCNC: 3.5 MG/DL — SIGNIFICANT CHANGE UP (ref 2.5–4.5)
PLATELET # BLD AUTO: 344 K/UL — SIGNIFICANT CHANGE UP (ref 150–400)
POTASSIUM SERPL-MCNC: 4.8 MMOL/L — SIGNIFICANT CHANGE UP (ref 3.5–5.3)
POTASSIUM SERPL-SCNC: 4.8 MMOL/L — SIGNIFICANT CHANGE UP (ref 3.5–5.3)
RBC # BLD: 4.17 M/UL — SIGNIFICANT CHANGE UP (ref 3.8–5.2)
RBC # FLD: 16.8 % — HIGH (ref 10.3–14.5)
SODIUM SERPL-SCNC: 139 MMOL/L — SIGNIFICANT CHANGE UP (ref 135–145)
WBC # BLD: 12.11 K/UL — HIGH (ref 3.8–10.5)
WBC # FLD AUTO: 12.11 K/UL — HIGH (ref 3.8–10.5)

## 2020-10-14 PROCEDURE — 99232 SBSQ HOSP IP/OBS MODERATE 35: CPT | Mod: GC

## 2020-10-14 PROCEDURE — 99232 SBSQ HOSP IP/OBS MODERATE 35: CPT

## 2020-10-14 RX ORDER — CEFTRIAXONE 500 MG/1
1000 INJECTION, POWDER, FOR SOLUTION INTRAMUSCULAR; INTRAVENOUS EVERY 24 HOURS
Refills: 0 | Status: DISCONTINUED | OUTPATIENT
Start: 2020-10-14 | End: 2020-10-15

## 2020-10-14 RX ADMIN — Medication 325 MILLIGRAM(S): at 12:22

## 2020-10-14 RX ADMIN — Medication 500 MICROGRAM(S): at 20:00

## 2020-10-14 RX ADMIN — TIOTROPIUM BROMIDE 1 CAPSULE(S): 18 CAPSULE ORAL; RESPIRATORY (INHALATION) at 22:26

## 2020-10-14 RX ADMIN — Medication 5 MILLILITER(S): at 17:47

## 2020-10-14 RX ADMIN — Medication 2: at 22:27

## 2020-10-14 RX ADMIN — Medication 240 MILLIGRAM(S): at 06:32

## 2020-10-14 RX ADMIN — FENTANYL CITRATE 1 PATCH: 50 INJECTION INTRAVENOUS at 08:27

## 2020-10-14 RX ADMIN — Medication 4 UNIT(S): at 12:21

## 2020-10-14 RX ADMIN — POLYETHYLENE GLYCOL 3350 17 GRAM(S): 17 POWDER, FOR SOLUTION ORAL at 12:25

## 2020-10-14 RX ADMIN — APIXABAN 5 MILLIGRAM(S): 2.5 TABLET, FILM COATED ORAL at 06:32

## 2020-10-14 RX ADMIN — Medication 81 MILLIGRAM(S): at 12:27

## 2020-10-14 RX ADMIN — CEFTRIAXONE 100 MILLIGRAM(S): 500 INJECTION, POWDER, FOR SOLUTION INTRAMUSCULAR; INTRAVENOUS at 17:48

## 2020-10-14 RX ADMIN — Medication 1 TABLET(S): at 12:27

## 2020-10-14 RX ADMIN — AZITHROMYCIN 500 MILLIGRAM(S): 500 TABLET, FILM COATED ORAL at 12:27

## 2020-10-14 RX ADMIN — Medication 4 UNIT(S): at 17:44

## 2020-10-14 RX ADMIN — Medication 1000 UNIT(S): at 12:27

## 2020-10-14 RX ADMIN — Medication 5 MILLILITER(S): at 06:38

## 2020-10-14 RX ADMIN — BUDESONIDE AND FORMOTEROL FUMARATE DIHYDRATE 2 PUFF(S): 160; 4.5 AEROSOL RESPIRATORY (INHALATION) at 06:33

## 2020-10-14 RX ADMIN — Medication 500 MICROGRAM(S): at 07:29

## 2020-10-14 RX ADMIN — SENNA PLUS 2 TABLET(S): 8.6 TABLET ORAL at 22:21

## 2020-10-14 RX ADMIN — INSULIN GLARGINE 12 UNIT(S): 100 INJECTION, SOLUTION SUBCUTANEOUS at 22:27

## 2020-10-14 RX ADMIN — Medication 6: at 17:43

## 2020-10-14 RX ADMIN — Medication 4: at 12:21

## 2020-10-14 RX ADMIN — Medication 30 MILLIGRAM(S): at 06:32

## 2020-10-14 RX ADMIN — Medication 600 MILLIGRAM(S): at 17:43

## 2020-10-14 RX ADMIN — PANTOPRAZOLE SODIUM 40 MILLIGRAM(S): 20 TABLET, DELAYED RELEASE ORAL at 06:32

## 2020-10-14 RX ADMIN — Medication 500 MICROGRAM(S): at 13:17

## 2020-10-14 RX ADMIN — APIXABAN 5 MILLIGRAM(S): 2.5 TABLET, FILM COATED ORAL at 17:42

## 2020-10-14 RX ADMIN — BUDESONIDE AND FORMOTEROL FUMARATE DIHYDRATE 2 PUFF(S): 160; 4.5 AEROSOL RESPIRATORY (INHALATION) at 18:15

## 2020-10-14 RX ADMIN — FENTANYL CITRATE 1 PATCH: 50 INJECTION INTRAVENOUS at 19:47

## 2020-10-14 RX ADMIN — ATORVASTATIN CALCIUM 80 MILLIGRAM(S): 80 TABLET, FILM COATED ORAL at 22:21

## 2020-10-14 RX ADMIN — MONTELUKAST 10 MILLIGRAM(S): 4 TABLET, CHEWABLE ORAL at 22:21

## 2020-10-14 RX ADMIN — Medication 600 MILLIGRAM(S): at 06:32

## 2020-10-14 RX ADMIN — Medication 4 UNIT(S): at 08:31

## 2020-10-14 NOTE — PROGRESS NOTE ADULT - PROBLEM SELECTOR PLAN 4
on metformin and glargine 12 U qhs, hold metformin while in hospital  - glargine 10units qhs  - continue humalog 3 units TID premeal   - moderate dose ISS  - accuchecks, hypoglycemia protocol  - Hba1c: 6.0. on metformin and glargine 12 U qhs, hold metformin while in hospital  - glargine 10units qhs  - continue humalog 3 units TID premeal   - moderate dose ISS  - accuchecks, hypoglycemia protocol  - Hba1c: 6.0

## 2020-10-14 NOTE — PROGRESS NOTE ADULT - PROBLEM SELECTOR PLAN 2
on 3LNC at rehab, currently requiring 4L nc with BIPAP, wean as tolerated  - continue spiriva, symbicort, montelukast, inhaler PRN  -theophylline held given tachycardia and brief afib  - BIPAP qhs and PRN  -of note pt is not on transplant list yet, is planning to go on list at [facility] if medically stable and improved on 3LNC at rehab, currently requiring 4L nc with BIPAP, wean as tolerated  - continue spiriva, symbicort, montelukast, inhaler PRN, atrovent   -theophylline held given tachycardia and brief afib  - BIPAP qhs and PRN  -of note pt is not on transplant list yet, is planning to go on list at [facility] if medically stable and improved

## 2020-10-14 NOTE — PROGRESS NOTE ADULT - ASSESSMENT
60F w/ end stage COPD on 3LNC (trying to get on  transplant list at [facility]), former smoker, CAD s/p stent (2016), afib on eliquis, DM2 (on insulin), raynaud phenomenon and recent hospitalization at [facility] for confusion and AURORA p/w sob, admitted for acute on chronic resp failure with hypoxia and hyercarbia sec to multifocal PNA and COPD exacerabtion with end stage COPD.

## 2020-10-14 NOTE — PROGRESS NOTE ADULT - PROBLEM SELECTOR PLAN 3
Resolved, Cr. 0.93 this AM   - likely 2/2 to prerenal vs hypoxemic  - bumetanide held 2/2 to AURORA, will resume (nephro dosing) as needed as per nephro recs  - trend renal indices Resolved, Cr. 0.87 this AM   - likely 2/2 to prerenal vs hypoxemic  - bumetanide held 2/2 to [facility], will resume (nephro dosing) as needed as per nephro recs  - trend renal indices

## 2020-10-14 NOTE — PROGRESS NOTE ADULT - SUBJECTIVE AND OBJECTIVE BOX
Patient is a 60y old  Female who presents with a chief complaint of COPD exacerbation, PNA (08 Oct 2020 11:37)       HPI:  60F w/ end stage COPD on 3LNC (pending transplant list at [facility]), former smoker, CAD s/p stent (2016), afib on eliquis, uncontrolled DM2 (on insulin), raynaud phenomenon and recent hospitalization at [facility] for confusion and [facility] p/w sob. [facility] Patient states that she has had worsening shortness of breath for past two days. Unable to obtain comprehensive history due to dyspnea. Patient denies fever, chills, sore throat, cough, chest pain, palpitations, abd pain, n/v. Currently feels short of breath even after receiving duonebs and steroids in the ED. Unable to keep mask on during interview and lay back in stretcher due to subjective sob. Patient repeatedly stating that it is too hot in her room and fanning herself with a paper. Per chart, Dr. Mathew (PCP) has chart notes regarding possible hallucinations. Would like her home medications now but otherwise has no acute complaints.  Has not seen nephrologist.  Denies any N/V.  SOB improving.  States she is urinating well.  Denies any urinary retention.  Denies any supplement or NSAID usage.         Getting breathing treatment  No new complaints  Still has SOB and NIELSON    PAST MEDICAL & SURGICAL HISTORY:  H/O Raynaud&#x27;s syndrome    Ascorbic acid deficiency    Iron deficiency anemia    Constipation    GERD without esophagitis    Type 2 diabetes mellitus    HTN (hypertension)    Heart failure    HLD (hyperlipidemia)    History of chronic atrial fibrillation  on Eliquis    End stage COPD  on 3LNC    Atrial fibrillation    Hyperlipemia    Chronic sinusitis    Raynaud phenomenon    HTN (hypertension)    DM (diabetes mellitus)    Claustrophobia    COPD (chronic obstructive pulmonary disease)    History of tonsillectomy    S/P tonsillectomy         FAMILY HISTORY:  FH: myocardial infarction    Family history of CABG    FH: MI (myocardial infarction)    FH: CABG (coronary artery bypass surgery)    NC    Social History:Non smoker    MEDICATIONS  (STANDING):  apixaban 5 milliGRAM(s) Oral every 12 hours  ascorbic acid 500 milliGRAM(s) Oral daily  aspirin  chewable 81 milliGRAM(s) Oral daily  atorvastatin 80 milliGRAM(s) Oral at bedtime  azithromycin  IVPB 500 milliGRAM(s) IV Intermittent every 24 hours  budesonide 160 MICROgram(s)/formoterol 4.5 MICROgram(s) Inhaler 2 Puff(s) Inhalation two times a day  cefepime   IVPB 2000 milliGRAM(s) IV Intermittent every 12 hours  cholecalciferol 1000 Unit(s) Oral daily  dextrose 5%. 1000 milliLiter(s) (50 mL/Hr) IV Continuous <Continuous>  dextrose 50% Injectable 12.5 Gram(s) IV Push once  dextrose 50% Injectable 25 Gram(s) IV Push once  dextrose 50% Injectable 25 Gram(s) IV Push once  ferrous    sulfate 325 milliGRAM(s) Oral daily  insulin glargine Injectable (LANTUS) 10 Unit(s) SubCutaneous at bedtime  insulin lispro (HumaLOG) corrective regimen sliding scale   SubCutaneous three times a day before meals  insulin lispro Injectable (HumaLOG) 3 Unit(s) SubCutaneous three times a day before meals  montelukast 10 milliGRAM(s) Oral at bedtime  multivitamin 1 Tablet(s) Oral daily  pantoprazole    Tablet 40 milliGRAM(s) Oral before breakfast  polyethylene glycol 3350 17 Gram(s) Oral daily  potassium chloride    Tablet ER 40 milliEquivalent(s) Oral every 4 hours  senna 2 Tablet(s) Oral at bedtime  tiotropium 18 MICROgram(s) Capsule 1 Capsule(s) Inhalation daily    MEDICATIONS  (PRN):  acetaminophen   Tablet .. 650 milliGRAM(s) Oral every 6 hours PRN Mild Pain (1 - 3)  bisacodyl Suppository 10 milliGRAM(s) Rectal daily PRN Constipation  dextrose 40% Gel 15 Gram(s) Oral once PRN Blood Glucose LESS THAN 70 milliGRAM(s)/deciliter  glucagon  Injectable 1 milliGRAM(s) IntraMuscular once PRN Glucose LESS THAN 70 milligrams/deciliter  guaiFENesin   Syrup  (Sugar-Free) 100 milliGRAM(s) Oral every 6 hours PRN Cough  lactulose Syrup 15 Gram(s) Oral two times a day PRN constipation  levalbuterol Inhalation 0.63 milliGRAM(s) Inhalation every 4 hours PRN shortness of breath and/or wheezing  oxyCODONE    IR 5 milliGRAM(s) Oral every 6 hours PRN Moderate Pain (4 - 6)   Meds reviewed    Allergies    No Known Allergies    Intolerances    albuterol (Unknown)       REVIEW OF SYSTEMS:    CONSTITUTIONAL:  No weight loss   EYES: No eye pain, visual disturbances, or discharge  ENMT:  No difficulty hearing, tinnitus, vertigo; No sinus or throat pain  NECK: No pain or stiffness  BREASTS: No pain, masses, or nipple discharge  RESPIRATORY: +SOB. +NIELSON  CARDIOVASCULAR: No chest pain, palpitations, dizziness,   GASTROINTESTINAL: No abdominal or epigastric pain. No nausea, vomiting, or hematemesis;  GENITOURINARY: No dysuria, frequency, hematuria, or incontinence  NEUROLOGICAL: No headaches, memory loss, loss of strength, numbness, or tremors  SKIN: no Diffuse erythema, no blisters  LYMPH NODES: No enlarged glands  ENDOCRINE: No heat or cold intolerance; No hair loss  MUSCULOSKELETAL: No joint pain or swelling   PSYCHIATRIC: No depression, anxiety, mood swings, or difficulty sleeping  ALLERGY AND IMMUNOLOGIC: No hives or eczema      ICU Vital Signs Last 24 Hrs  T(C): 36.4 (12 Oct 2020 13:00), Max: 36.7 (12 Oct 2020 05:11)  T(F): 97.6 (12 Oct 2020 13:00), Max: 98 (12 Oct 2020 05:11)  HR: 80 (12 Oct 2020 13:40) (70 - 83)  BP: 155/69 (12 Oct 2020 13:00) (129/77 - 155/69)  BP(mean): --  ABP: --  ABP(mean): --  RR: 18 (12 Oct 2020 13:00) (17 - 22)  SpO2: 95% (12 Oct 2020 13:40) (94% - 100%)          PHYSICAL EXAM:    GENERAL: No distress  HEAD:  Atraumatic, Normocephalic  EYES: EOMI, conjunctiva and sclera clear  ENMT: No drainage from nares, no drainage from pinna  NECK: Supple, neck  veins full  NERVOUS SYSTEM:  Alert & Oriented X3, Good concentration; Motor Strength wnl upper and lower extremities  CHEST/LUNG: Decreased BS, B/L rales, no rhonchi, Mild wheezing  HEART: Regular rate and rhythm; No murmurs, rubs, or gallops  ABDOMEN: Soft, Nontender, Nondistended; Bowel sounds present,  EXTREMITIES: trace Edema  SKIN: No rashes or lesions, pale      LABS:                                      10.1   12.11 )-----------( 344      ( 14 Oct 2020 08:35 )             34.6     10-14    139  |  102  |  25<H>  ----------------------------<  141<H>  4.8   |  31  |  0.87    Ca    9.1      14 Oct 2020 08:35  Phos  3.5     10-14  Mg     2.2     10-14          Magnesium, Serum: 2.2 mg/dL (10-14 @ 08:35)  Phosphorus Level, Serum: 3.5 mg/dL (10-14 @ 08:35)

## 2020-10-14 NOTE — PROGRESS NOTE ADULT - PROBLEM SELECTOR PLAN 1
likely multifactorial from multifocal PNA and COPD exacerbation  - slow clinical improvement  - currently on 5L NC with BIPAP qhs   - maintain O2 >/ 88, will slowly titrate down to goal of 3L which pt requires at home. goal sat >88 percent  - Continue spiriva, symbicort, montelukast, inhaler PRN  - Continue ceftriaxone (last day 10/18) and azithromycin (home med)  - Pulm (Dr. Rojas): continue steroid taper; continue abx, atrovent hhn helped, continue mucinex  - d/w ID, Dr. Juarez--will resume azithromycin as it is her home medication (end stage COPD prophylaxis). Continue rocephin [id number] (last day 10/18)  - Blood and sputum culture negative  - Sputum culture has changed will repeat it as per ID   - apprec palliative care support in management due to complexity of care and nature of history  - biotene PRN for sore throat/hoarseness; Mucinex likely multifactorial from multifocal PNA and COPD exacerbation  - slow clinical improvement  - currently on 4L NC with BIPAP qhs   - maintain O2 >/ 88, will slowly titrate down to goal of 3L which pt requires at home. goal sat >88 percent  - Continue spiriva, symbicort, montelukast, inhaler PRN, atrovent   - Continue ceftriaxone (last day 10/18) and azithromycin (home med)  - Pulm (Dr. Rojas): continue steroid taper, abx, atrovent and mucinex  - d/w ID, Dr. Juarez--will resume azithromycin as it is her home medication (end stage COPD prophylaxis). Continue rocephin [id number] (last day 10/18)  - Blood and sputum culture negative  - Sputum culture has changed will repeat it as per ID   - apprec palliative care support in management due to complexity of care and nature of history  - biotene PRN for sore throat/hoarseness; Mucinex likely multifactorial from multifocal PNA and COPD exacerbation  - slow clinical improvement  - currently on 4L NC with BIPAP qhs   - maintain O2 >/ 88, will slowly titrate down to goal of 3L which pt requires at home. goal sat >88 percent  - Continue spiriva, symbicort, montelukast, inhaler PRN, atrovent   - Continue ceftriaxone (last day 10/18) and azithromycin (home med)  - Pulm (Dr. Rojas): continue steroid taper, abx, atrovent and mucinex  - ID, Dr. Juarez on board--recs appreciated  - Blood culture negative  - Sputum culture has changed, will repeat it as per ID   - apprec palliative care support in management due to complexity of care and nature of history  - biotene PRN for sore throat/hoarseness; Mucinex likely multifactorial from multifocal PNA and COPD exacerbation  - slow clinical improvement  - currently on 4L NC with BIPAP qhs   - maintain O2 >/ 88, will slowly titrate down to goal of 3L which pt requires at home. goal sat >88 percent  - Continue spiriva, symbicort, montelukast, inhaler PRN, atrovent   - Continue ceftriaxone (last day 10/15) and azithromycin (home med)  - Pulm (Dr. Rojas): continue steroid taper, abx, atrovent and mucinex  - ID, Dr. Juarez on board--recs appreciated  - Blood culture negative  - Sputum cx w/ normal respiratory richie  - f/u repeat sputum cx  - apprec palliative care support in management due to complexity of care and nature of history  - biotene PRN for sore throat/hoarseness; Mucinex

## 2020-10-14 NOTE — PROGRESS NOTE ADULT - ATTENDING COMMENTS
I personally conducted a physical examination of the patient. I personally gathered the patient's history. I edited the above listed findings which were prepared by the listed resident physician. I personally discussed the plan of care with the patient. The questions and concerns were addressed to the best of my ability. The patient is in agreement with the listed treatment plan.    Patient seen and examined. Patient states she feels "the same". States her breathing is stable at rest, but gets very labored with minimal exertion. Continue abx for now. Patient refusing to go to [facility], and does not want to go home until she feels she is "ready".

## 2020-10-14 NOTE — PROGRESS NOTE ADULT - SUBJECTIVE AND OBJECTIVE BOX
[facility] -- Adriana Gonzales, Funmilayo Fuentes, Dylan Cormier Savella, Goodger: Office # [phone]    Follow Up:  SOB    Subjective/Observations: Patient seen and examined. Patient awake, alert, resting in bed. No complaints of chest pain, dyspnea, palpitations or dizziness. No signs of orthopnea or PND. Tolerating O2 via nasal cannula. BiPAP at night. Still c/o severe NIELSON with any kind of activities, Breathing is better, but not back to baseline     REVIEW OF SYSTEMS: All review of systems is negative for eye, ENT, GI, , allergic, dermatologic, musculoskeletal and neurologic except as described above    PAST MEDICAL & SURGICAL HISTORY:  Ascorbic acid deficiency  Iron deficiency anemia  Constipation  GERD without esophagitis  Type 2 diabetes mellitus  HTN (hypertension)  Heart failure  End stage COPD  on 3LNC  Atrial fibrillation  Hyperlipemia  Chronic sinusitis  Raynaud phenomenon  Claustrophobia  COPD (chronic obstructive pulmonary disease)  S/P tonsillectomy    MEDICATIONS  (STANDING):  apixaban 5 milliGRAM(s) Oral every 12 hours  aspirin  chewable 81 milliGRAM(s) Oral daily  atorvastatin 80 milliGRAM(s) Oral at bedtime  azithromycin   Tablet 500 milliGRAM(s) Oral daily  Biotene Dry Mouth Oral Rinse 5 milliLiter(s) Swish and Spit two times a day  budesonide 160 MICROgram(s)/formoterol 4.5 MICROgram(s) Inhaler 2 Puff(s) Inhalation two times a day  cefTRIAXone   IVPB 1000 milliGRAM(s) IV Intermittent every 24 hours  cholecalciferol 1000 Unit(s) Oral daily  dextrose 5%. 1000 milliLiter(s) (50 mL/Hr) IV Continuous <Continuous>  dextrose 50% Injectable 12.5 Gram(s) IV Push once  dextrose 50% Injectable 25 Gram(s) IV Push once  dextrose 50% Injectable 25 Gram(s) IV Push once  diltiazem    milliGRAM(s) Oral daily  fentaNYL   Patch  12 MICROgram(s)/Hr 1 Patch Transdermal every 72 hours  ferrous    sulfate 325 milliGRAM(s) Oral daily  guaiFENesin  milliGRAM(s) Oral every 12 hours  insulin glargine Injectable (LANTUS) 12 Unit(s) SubCutaneous at bedtime  insulin lispro (HumaLOG) corrective regimen sliding scale   SubCutaneous three times a day before meals  insulin lispro (HumaLOG) corrective regimen sliding scale   SubCutaneous at bedtime  insulin lispro Injectable (HumaLOG) 4 Unit(s) SubCutaneous three times a day before meals  ipratropium    for Nebulization 500 MICROGram(s) Nebulizer every 6 hours  montelukast 10 milliGRAM(s) Oral at bedtime  multivitamin 1 Tablet(s) Oral daily  pantoprazole    Tablet 40 milliGRAM(s) Oral before breakfast  polyethylene glycol 3350 17 Gram(s) Oral daily  predniSONE   Tablet 30 milliGRAM(s) Oral daily  senna 2 Tablet(s) Oral at bedtime  tiotropium 18 MICROgram(s) Capsule 1 Capsule(s) Inhalation daily    MEDICATIONS  (PRN):  acetaminophen   Tablet .. 650 milliGRAM(s) Oral every 6 hours PRN Mild Pain (1 - 3)  bisacodyl Suppository 10 milliGRAM(s) Rectal daily PRN Constipation  dextrose 40% Gel 15 Gram(s) Oral once PRN Blood Glucose LESS THAN 70 milliGRAM(s)/deciliter  glucagon  Injectable 1 milliGRAM(s) IntraMuscular once PRN Glucose LESS THAN 70 milligrams/deciliter  lactulose Syrup 15 Gram(s) Oral two times a day PRN constipation    Allergies  No Known Allergies    Intolerances  albuterol (Unknown)    Vital Signs Last 24 Hrs  T(C): 36.8 (14 Oct 2020 07:03), Max: 37 (13 Oct 2020 13:07)  T(F): 98.2 (14 Oct 2020 07:03), Max: 98.6 (13 Oct 2020 13:07)  HR: 90 (14 Oct 2020 07:03) (83 - 90)  BP: 142/72 (14 Oct 2020 07:03) (120/69 - 142/72)  BP(mean): --  RR: 18 (14 Oct 2020 07:03) (18 - 18)  SpO2: 93% (14 Oct 2020 07:03) (93% - 95%)    I&O's Summary    13 Oct 2020 07:01  -  14 Oct 2020 07:00  --------------------------------------------------------  IN: 50 mL / OUT: 600 mL / NET: -550 mL    TELE: SR BBB PACs mostly 90, 100s at times   PHYSICAL EXAM:  Appearance: NAD, no distress, alert, Well developed   HEENT: Moist Mucous Membranes, Anicteric  Cardiovascular: Regular rate and rhythm, Normal S1 S2, No JVD, No murmurs, No rubs, gallops or clicks  Respiratory: Non-labored, Clear to auscultation, Diminished at bases , No rales, No rhonchi, No wheezing.   Gastrointestinal:  Soft, Non-tender, + BS  Neurologic: Non-focal  Skin: Warm and dry, No visible rashes or ulcers, No ecchymosis, No cyanosis  Musculoskeletal: No clubbing, No cyanosis, No joint swelling/tenderness  Psychiatry: Mood & affect appropriate  Lymph: No peripheral edema.     LABS: All Labs Reviewed:                        10.1 12.11 )-----------( 344      ( 14 Oct 2020 08:35 )             34.6                         9.2    16.27 )-----------( 372      ( 13 Oct 2020 08:43 )             31.6                         9.2    12.44 )-----------( 382      ( 12 Oct 2020 08:50 )             30.8     14 Oct 2020 08:35    139    |  102    |  25     ----------------------------<  141    4.8     |  31     |  0.87   13 Oct 2020 08:43    140    |  100    |  32     ----------------------------<  208    4.7     |  35     |  0.93   12 Oct 2020 08:50    140    |  101    |  33     ----------------------------<  167    4.9     |  36     |  0.94     Ca    9.1        14 Oct 2020 08:35  Ca    8.9        13 Oct 2020 08:43  Ca    9.0        12 Oct 2020 08:50  Phos  3.5       14 Oct 2020 08:35  Phos  2.9       13 Oct 2020 08:43  Mg     2.2       14 Oct 2020 08:35  Mg     2.2       13 Oct 2020 08:43  TPro  6.0    /  Alb  2.3    /  TBili  0.3    /  DBili  x      /  AST  10     /  ALT  14     /  AlkPhos  65     12 Oct 2020 08:50    12 Lead ECG:   Ventricular Rate 109 BPM  Atrial Rate 109 BPM  P-R Interval 150 ms  QRS Duration 136 ms  Q-T Interval 390 ms  QTC Calculation(Bazett) 525 ms  P Axis 56 degrees  R Axis -16 degrees  T Axis 57 degrees  Diagnosis Line Sinus tachycardia with premature supraventricular complexes  Right bundle branch block  Abnormal ECG  Confirmed by LUIS ENRIQUE CORMIER ([id number]) on 10/8/2020 11:41:04 AM (10-08-20 @ 08:51)    < from: TTE Echo Complete w/o Contrast w/ Doppler (10.06.20 @ 17:10) >  Dimensions:  LA 3.7       Normal Values: 2.0 - 4.0 cm  Ao 2.7        Normal Values: 2.0 - 3.8 cm  SEPTUM 1.4       Normal Values: 0.6 - 1.2 cm  PWT 1.3       Normal Values: 0.6 - 1.1 cm  LVIDd 3.9         Normal Values: 3.0 - 5.6 cm  LVIDs 2.8         Normal Values: 1.8 - 4.0 cm    OBSERVATIONS:  Technically difficult and limited study  Mitral Valve: Thickened leaflets, mild MR.  Aortic Valve/Aorta: Aortic valve is not well-visualized, grossly normal function without severe pathology  Tricuspid Valve: normal with trace TR.  Pulmonic Valve: Not well-visualized  Left Atrium: normal  Right Atrium: Not well-visualized  Left Ventricle: normal LV size and systolic function, estimated LVEF of 55%. Mild LVH. Endocardium is not well-visualized, cannot rule out segmental dysfunction  Right Ventricle: Grossly normal size and systolic function.  Pericardium/Pleura: normal, no significant pericardial effusion.  Pulmonary/RV Pressure: estimated PA systolic pressure of 15.7 mmHg assuming an RA pressure of 3 mmHg.  LV diastolic dysfunction is present    Conclusion:  Technically difficult and limited study  Mild concentric LVH with grossly normal left ventricular systolic function, estimated LVEF of 55%.  Grossly normal RV size and systolic function.  Aortic valve is not well-visualized, grossly normal function without severe pathology  Mild MR  Trace TR.  No significant pericardial effusion.    < end of copied text >    < from: Cardiac Cath Lab - Adult (03.24.17 @ 11:16) >  VENTRICLES: Global left ventricular function was normal. EF estimated was  65 %.  CORONARY VESSELS: The coronary circulation is right dominant.  LM:   --  Mid left main: There was a 30 % stenosis.  LAD:   --  Ostial LAD: There was a 40 % stenosis.  CX:   --  Circumflex: Normal.  RCA:   --  RCA: Normal.  COMPLICATIONS: There were no complications.  DIAGNOSTIC RECOMMENDATIONS: The patient should continue with the present  medications.  < end of copied text >

## 2020-10-14 NOTE — PROGRESS NOTE ADULT - SUBJECTIVE AND OBJECTIVE BOX
Patient is a 60y old  Female who presents with a chief complaint of COPD exacerbation, PNA (13 Oct 2020 12:45)      INTERVAL HPI/OVERNIGHT EVENTS:    MEDICATIONS  (STANDING):  apixaban 5 milliGRAM(s) Oral every 12 hours  aspirin  chewable 81 milliGRAM(s) Oral daily  atorvastatin 80 milliGRAM(s) Oral at bedtime  azithromycin   Tablet 500 milliGRAM(s) Oral daily  Biotene Dry Mouth Oral Rinse 5 milliLiter(s) Swish and Spit two times a day  budesonide 160 MICROgram(s)/formoterol 4.5 MICROgram(s) Inhaler 2 Puff(s) Inhalation two times a day  cefTRIAXone   IVPB 1000 milliGRAM(s) IV Intermittent every 24 hours  cholecalciferol 1000 Unit(s) Oral daily  dextrose 5%. 1000 milliLiter(s) (50 mL/Hr) IV Continuous <Continuous>  dextrose 50% Injectable 12.5 Gram(s) IV Push once  dextrose 50% Injectable 25 Gram(s) IV Push once  dextrose 50% Injectable 25 Gram(s) IV Push once  diltiazem    milliGRAM(s) Oral daily  fentaNYL   Patch  12 MICROgram(s)/Hr 1 Patch Transdermal every 72 hours  ferrous    sulfate 325 milliGRAM(s) Oral daily  guaiFENesin  milliGRAM(s) Oral every 12 hours  insulin glargine Injectable (LANTUS) 12 Unit(s) SubCutaneous at bedtime  insulin lispro (HumaLOG) corrective regimen sliding scale   SubCutaneous three times a day before meals  insulin lispro (HumaLOG) corrective regimen sliding scale   SubCutaneous at bedtime  insulin lispro Injectable (HumaLOG) 4 Unit(s) SubCutaneous three times a day before meals  ipratropium    for Nebulization 500 MICROGram(s) Nebulizer every 6 hours  montelukast 10 milliGRAM(s) Oral at bedtime  multivitamin 1 Tablet(s) Oral daily  pantoprazole    Tablet 40 milliGRAM(s) Oral before breakfast  polyethylene glycol 3350 17 Gram(s) Oral daily  predniSONE   Tablet 30 milliGRAM(s) Oral daily  senna 2 Tablet(s) Oral at bedtime  tiotropium 18 MICROgram(s) Capsule 1 Capsule(s) Inhalation daily    MEDICATIONS  (PRN):  acetaminophen   Tablet .. 650 milliGRAM(s) Oral every 6 hours PRN Mild Pain (1 - 3)  bisacodyl Suppository 10 milliGRAM(s) Rectal daily PRN Constipation  dextrose 40% Gel 15 Gram(s) Oral once PRN Blood Glucose LESS THAN 70 milliGRAM(s)/deciliter  glucagon  Injectable 1 milliGRAM(s) IntraMuscular once PRN Glucose LESS THAN 70 milligrams/deciliter  lactulose Syrup 15 Gram(s) Oral two times a day PRN constipation      Allergies    No Known Allergies    Intolerances    albuterol (Unknown)      REVIEW OF SYSTEMS:  CONSTITUTIONAL: No fever or chills  HEENT:  No headache, no sore throat  RESPIRATORY: No cough, wheezing, or shortness of breath  CARDIOVASCULAR: No chest pain, palpitations  GASTROINTESTINAL: No abd pain, nausea, vomiting, or diarrhea  GENITOURINARY: No dysuria, frequency, or hematuria  NEUROLOGICAL: no focal weakness or dizziness  MUSCULOSKELETAL: no myalgias     Vital Signs Last 24 Hrs  T(C): 36.8 (14 Oct 2020 07:03), Max: 37 (13 Oct 2020 13:07)  T(F): 98.2 (14 Oct 2020 07:03), Max: 98.6 (13 Oct 2020 13:07)  HR: 90 (14 Oct 2020 07:03) (83 - 90)  BP: 142/72 (14 Oct 2020 07:03) (120/69 - 142/72)  BP(mean): --  RR: 18 (14 Oct 2020 07:03) (18 - 18)  SpO2: 93% (14 Oct 2020 07:03) (93% - 95%)    PHYSICAL EXAM:  GENERAL: NAD  HEENT:  anicteric, moist mucous membranes  CHEST/LUNG:  CTA b/l, no rales, wheezes, or rhonchi  HEART:  RRR, S1, S2  ABDOMEN:  BS+, soft, nontender, nondistended  EXTREMITIES: no edema, cyanosis, or calf tenderness  NERVOUS SYSTEM: answers questions and follows commands appropriately    LABS:    CBC Full  -  ( 13 Oct 2020 08:43 )  WBC Count : 16.27 K/uL  Hemoglobin : 9.2 g/dL  Hematocrit : 31.6 %  Platelet Count - Automated : 372 K/uL  Mean Cell Volume : 82.3 fl  Mean Cell Hemoglobin : 24.0 pg  Mean Cell Hemoglobin Concentration : 29.1 gm/dL  Auto Neutrophil # : 13.14 K/uL  Auto Lymphocyte # : 1.01 K/uL  Auto Monocyte # : 1.09 K/uL  Auto Eosinophil # : 0.63 K/uL  Auto Basophil # : 0.02 K/uL  Auto Neutrophil % : 80.8 %  Auto Lymphocyte % : 6.2 %  Auto Monocyte % : 6.7 %  Auto Eosinophil % : 3.9 %  Auto Basophil % : 0.1 %      Ca    8.9        13 Oct 2020 08:43          CAPILLARY BLOOD GLUCOSE      POCT Blood Glucose.: 143 mg/dL (14 Oct 2020 08:23)  POCT Blood Glucose.: 385 mg/dL (13 Oct 2020 22:30)  POCT Blood Glucose.: 350 mg/dL (13 Oct 2020 21:14)  POCT Blood Glucose.: 172 mg/dL (13 Oct 2020 17:11)  POCT Blood Glucose.: 200 mg/dL (13 Oct 2020 12:08)        Culture - Sputum (collected 10-12-20 @ 16:44)  Source: .Sputum Sputum  Gram Stain (10-12-20 @ 19:22):    No polymorphonuclear leukocytes per low power field    Rare Squamous epithelial cells per low power field    Few Gram Negative Rods per oil power field    Rare Gram positive cocci in pairs per oil power field  Preliminary Report (10-13-20 @ 17:21):    Normal Respiratory Samaria present    Culture - Sputum (collected 10-09-20 @ 06:28)  Source: .Sputum Sputum  Gram Stain (10-09-20 @ 13:47):    No polymorphonuclear leukocytes per low power field    Few Squamous epithelial cells per low power field    Few Gram Positive Cocci in Clusters per oil power field  Final Report (10-11-20 @ 08:47):    Normal Respiratory Samaria present        RADIOLOGY & ADDITIONAL TESTS:    Personally reviewed.     Consultant(s) Notes Reviewed:  [x] YES  [ ] NO     Patient is a 60y old  Female who presents with a chief complaint of COPD exacerbation, PNA (13 Oct 2020 12:45)      INTERVAL HPI/OVERNIGHT EVENTS: Pt seen and examined at bedside. No acute overnight events. Patient notes a coughing fit this morning when she sat up and could not breathe. When she coughs she brings up some yellow phlegm. Still has mild short of breath, but slightly improved. Currently on 5L O2 saturating well. Denies fevers, chills, chest pain, abdominal pain, N/V/D/C.       MEDICATIONS  (STANDING):  apixaban 5 milliGRAM(s) Oral every 12 hours  aspirin  chewable 81 milliGRAM(s) Oral daily  atorvastatin 80 milliGRAM(s) Oral at bedtime  azithromycin   Tablet 500 milliGRAM(s) Oral daily  Biotene Dry Mouth Oral Rinse 5 milliLiter(s) Swish and Spit two times a day  budesonide 160 MICROgram(s)/formoterol 4.5 MICROgram(s) Inhaler 2 Puff(s) Inhalation two times a day  cefTRIAXone   IVPB 1000 milliGRAM(s) IV Intermittent every 24 hours  cholecalciferol 1000 Unit(s) Oral daily  dextrose 5%. 1000 milliLiter(s) (50 mL/Hr) IV Continuous <Continuous>  dextrose 50% Injectable 12.5 Gram(s) IV Push once  dextrose 50% Injectable 25 Gram(s) IV Push once  dextrose 50% Injectable 25 Gram(s) IV Push once  diltiazem    milliGRAM(s) Oral daily  fentaNYL   Patch  12 MICROgram(s)/Hr 1 Patch Transdermal every 72 hours  ferrous    sulfate 325 milliGRAM(s) Oral daily  guaiFENesin  milliGRAM(s) Oral every 12 hours  insulin glargine Injectable (LANTUS) 12 Unit(s) SubCutaneous at bedtime  insulin lispro (HumaLOG) corrective regimen sliding scale   SubCutaneous three times a day before meals  insulin lispro (HumaLOG) corrective regimen sliding scale   SubCutaneous at bedtime  insulin lispro Injectable (HumaLOG) 4 Unit(s) SubCutaneous three times a day before meals  ipratropium    for Nebulization 500 MICROGram(s) Nebulizer every 6 hours  montelukast 10 milliGRAM(s) Oral at bedtime  multivitamin 1 Tablet(s) Oral daily  pantoprazole    Tablet 40 milliGRAM(s) Oral before breakfast  polyethylene glycol 3350 17 Gram(s) Oral daily  predniSONE   Tablet 30 milliGRAM(s) Oral daily  senna 2 Tablet(s) Oral at bedtime  tiotropium 18 MICROgram(s) Capsule 1 Capsule(s) Inhalation daily    MEDICATIONS  (PRN):  acetaminophen   Tablet .. 650 milliGRAM(s) Oral every 6 hours PRN Mild Pain (1 - 3)  bisacodyl Suppository 10 milliGRAM(s) Rectal daily PRN Constipation  dextrose 40% Gel 15 Gram(s) Oral once PRN Blood Glucose LESS THAN 70 milliGRAM(s)/deciliter  glucagon  Injectable 1 milliGRAM(s) IntraMuscular once PRN Glucose LESS THAN 70 milligrams/deciliter  lactulose Syrup 15 Gram(s) Oral two times a day PRN constipation      Allergies    No Known Allergies    Intolerances    albuterol (Unknown)      REVIEW OF SYSTEMS:  CONSTITUTIONAL: No fever or chills  HEENT:  No headache, no sore throat  RESPIRATORY: +cough, +shortness of breath; no wheezing   CARDIOVASCULAR: No chest pain, palpitations  GASTROINTESTINAL: No abd pain, nausea, vomiting, or diarrhea  GENITOURINARY: No dysuria, frequency, or hematuria  NEUROLOGICAL: no focal weakness or dizziness  MUSCULOSKELETAL: no myalgias     Vital Signs Last 24 Hrs  T(C): 36.8 (14 Oct 2020 07:03), Max: 37 (13 Oct 2020 13:07)  T(F): 98.2 (14 Oct 2020 07:03), Max: 98.6 (13 Oct 2020 13:07)  HR: 90 (14 Oct 2020 07:03) (83 - 90)  BP: 142/72 (14 Oct 2020 07:03) (120/69 - 142/72)  BP(mean): --  RR: 18 (14 Oct 2020 07:03) (18 - 18)  SpO2: 93% (14 Oct 2020 07:03) (93% - 95%)    PHYSICAL EXAM:  GENERAL: NAD, sitting up in bed   HEENT:  anicteric, moist mucous membranes  CHEST/LUNG:  decreased breath sounds b/l, no rales, wheezes, or rhonchi  HEART:  RRR, S1, S2; no murmurs, rubs or gallops   ABDOMEN:  BS+, soft, nontender, nondistended  EXTREMITIES: no edema, cyanosis, or calf tenderness  NERVOUS SYSTEM: answers questions and follows commands appropriately    LABS:    CBC Full  -  ( 13 Oct 2020 08:43 )  WBC Count : 16.27 K/uL  Hemoglobin : 9.2 g/dL  Hematocrit : 31.6 %  Platelet Count - Automated : 372 K/uL  Mean Cell Volume : 82.3 fl  Mean Cell Hemoglobin : 24.0 pg  Mean Cell Hemoglobin Concentration : 29.1 gm/dL  Auto Neutrophil # : 13.14 K/uL  Auto Lymphocyte # : 1.01 K/uL  Auto Monocyte # : 1.09 K/uL  Auto Eosinophil # : 0.63 K/uL  Auto Basophil # : 0.02 K/uL  Auto Neutrophil % : 80.8 %  Auto Lymphocyte % : 6.2 %  Auto Monocyte % : 6.7 %  Auto Eosinophil % : 3.9 %  Auto Basophil % : 0.1 %      Ca    8.9        13 Oct 2020 08:43          CAPILLARY BLOOD GLUCOSE      POCT Blood Glucose.: 143 mg/dL (14 Oct 2020 08:23)  POCT Blood Glucose.: 385 mg/dL (13 Oct 2020 22:30)  POCT Blood Glucose.: 350 mg/dL (13 Oct 2020 21:14)  POCT Blood Glucose.: 172 mg/dL (13 Oct 2020 17:11)  POCT Blood Glucose.: 200 mg/dL (13 Oct 2020 12:08)        Culture - Sputum (collected 10-12-20 @ 16:44)  Source: .Sputum Sputum  Gram Stain (10-12-20 @ 19:22):    No polymorphonuclear leukocytes per low power field    Rare Squamous epithelial cells per low power field    Few Gram Negative Rods per oil power field    Rare Gram positive cocci in pairs per oil power field  Preliminary Report (10-13-20 @ 17:21):    Normal Respiratory Samaria present    Culture - Sputum (collected 10-09-20 @ 06:28)  Source: .Sputum Sputum  Gram Stain (10-09-20 @ 13:47):    No polymorphonuclear leukocytes per low power field    Few Squamous epithelial cells per low power field    Few Gram Positive Cocci in Clusters per oil power field  Final Report (10-11-20 @ 08:47):    Normal Respiratory Samaria present      Consultant(s) Notes Reviewed:  [x] YES  [ ] NO       Patient is a 60y old  Female who presents with a chief complaint of COPD exacerbation, PNA (13 Oct 2020 12:45)      INTERVAL HPI/OVERNIGHT EVENTS: Pt seen and examined at bedside. No acute overnight events. Patient notes a coughing fit this morning when she sat up and felt SOB. When she coughs she brings up some yellow phlegm. Still has mild short of breath, but slightly improved. Currently on 4L O2 saturating well. Denies fevers, chills, chest pain, abdominal pain, N/V/D/C.       MEDICATIONS  (STANDING):  apixaban 5 milliGRAM(s) Oral every 12 hours  aspirin  chewable 81 milliGRAM(s) Oral daily  atorvastatin 80 milliGRAM(s) Oral at bedtime  azithromycin   Tablet 500 milliGRAM(s) Oral daily  Biotene Dry Mouth Oral Rinse 5 milliLiter(s) Swish and Spit two times a day  budesonide 160 MICROgram(s)/formoterol 4.5 MICROgram(s) Inhaler 2 Puff(s) Inhalation two times a day  cefTRIAXone   IVPB 1000 milliGRAM(s) IV Intermittent every 24 hours  cholecalciferol 1000 Unit(s) Oral daily  dextrose 5%. 1000 milliLiter(s) (50 mL/Hr) IV Continuous <Continuous>  dextrose 50% Injectable 12.5 Gram(s) IV Push once  dextrose 50% Injectable 25 Gram(s) IV Push once  dextrose 50% Injectable 25 Gram(s) IV Push once  diltiazem    milliGRAM(s) Oral daily  fentaNYL   Patch  12 MICROgram(s)/Hr 1 Patch Transdermal every 72 hours  ferrous    sulfate 325 milliGRAM(s) Oral daily  guaiFENesin  milliGRAM(s) Oral every 12 hours  insulin glargine Injectable (LANTUS) 12 Unit(s) SubCutaneous at bedtime  insulin lispro (HumaLOG) corrective regimen sliding scale   SubCutaneous three times a day before meals  insulin lispro (HumaLOG) corrective regimen sliding scale   SubCutaneous at bedtime  insulin lispro Injectable (HumaLOG) 4 Unit(s) SubCutaneous three times a day before meals  ipratropium    for Nebulization 500 MICROGram(s) Nebulizer every 6 hours  montelukast 10 milliGRAM(s) Oral at bedtime  multivitamin 1 Tablet(s) Oral daily  pantoprazole    Tablet 40 milliGRAM(s) Oral before breakfast  polyethylene glycol 3350 17 Gram(s) Oral daily  predniSONE   Tablet 30 milliGRAM(s) Oral daily  senna 2 Tablet(s) Oral at bedtime  tiotropium 18 MICROgram(s) Capsule 1 Capsule(s) Inhalation daily    MEDICATIONS  (PRN):  acetaminophen   Tablet .. 650 milliGRAM(s) Oral every 6 hours PRN Mild Pain (1 - 3)  bisacodyl Suppository 10 milliGRAM(s) Rectal daily PRN Constipation  dextrose 40% Gel 15 Gram(s) Oral once PRN Blood Glucose LESS THAN 70 milliGRAM(s)/deciliter  glucagon  Injectable 1 milliGRAM(s) IntraMuscular once PRN Glucose LESS THAN 70 milligrams/deciliter  lactulose Syrup 15 Gram(s) Oral two times a day PRN constipation      Allergies    No Known Allergies    Intolerances    albuterol (Unknown)      REVIEW OF SYSTEMS:  CONSTITUTIONAL: No fever or chills  HEENT:  No headache, no sore throat  RESPIRATORY: +cough, +shortness of breath; no wheezing   CARDIOVASCULAR: No chest pain, palpitations  GASTROINTESTINAL: No abd pain, nausea, vomiting, or diarrhea  GENITOURINARY: No dysuria, frequency, or hematuria  NEUROLOGICAL: no focal weakness or dizziness  MUSCULOSKELETAL: no myalgias     Vital Signs Last 24 Hrs  T(C): 36.8 (14 Oct 2020 07:03), Max: 37 (13 Oct 2020 13:07)  T(F): 98.2 (14 Oct 2020 07:03), Max: 98.6 (13 Oct 2020 13:07)  HR: 90 (14 Oct 2020 07:03) (83 - 90)  BP: 142/72 (14 Oct 2020 07:03) (120/69 - 142/72)  BP(mean): --  RR: 18 (14 Oct 2020 07:03) (18 - 18)  SpO2: 93% (14 Oct 2020 07:03) (93% - 95%)    PHYSICAL EXAM:  GENERAL: NAD, sitting up in bed   HEENT:  anicteric, moist mucous membranes  CHEST/LUNG:  decreased breath sounds b/l, no rales, wheezes, or rhonchi  HEART:  RRR, S1, S2; no murmurs, rubs or gallops   ABDOMEN:  BS+, soft, nontender, nondistended  EXTREMITIES: no edema, cyanosis, or calf tenderness  NERVOUS SYSTEM: answers questions and follows commands appropriately    LABS:    CBC Full  -  ( 13 Oct 2020 08:43 )  WBC Count : 16.27 K/uL  Hemoglobin : 9.2 g/dL  Hematocrit : 31.6 %  Platelet Count - Automated : 372 K/uL  Mean Cell Volume : 82.3 fl  Mean Cell Hemoglobin : 24.0 pg  Mean Cell Hemoglobin Concentration : 29.1 gm/dL  Auto Neutrophil # : 13.14 K/uL  Auto Lymphocyte # : 1.01 K/uL  Auto Monocyte # : 1.09 K/uL  Auto Eosinophil # : 0.63 K/uL  Auto Basophil # : 0.02 K/uL  Auto Neutrophil % : 80.8 %  Auto Lymphocyte % : 6.2 %  Auto Monocyte % : 6.7 %  Auto Eosinophil % : 3.9 %  Auto Basophil % : 0.1 %      Ca    8.9        13 Oct 2020 08:43          CAPILLARY BLOOD GLUCOSE      POCT Blood Glucose.: 143 mg/dL (14 Oct 2020 08:23)  POCT Blood Glucose.: 385 mg/dL (13 Oct 2020 22:30)  POCT Blood Glucose.: 350 mg/dL (13 Oct 2020 21:14)  POCT Blood Glucose.: 172 mg/dL (13 Oct 2020 17:11)  POCT Blood Glucose.: 200 mg/dL (13 Oct 2020 12:08)        Culture - Sputum (collected 10-12-20 @ 16:44)  Source: .Sputum Sputum  Gram Stain (10-12-20 @ 19:22):    No polymorphonuclear leukocytes per low power field    Rare Squamous epithelial cells per low power field    Few Gram Negative Rods per oil power field    Rare Gram positive cocci in pairs per oil power field  Preliminary Report (10-13-20 @ 17:21):    Normal Respiratory Samaria present    Culture - Sputum (collected 10-09-20 @ 06:28)  Source: .Sputum Sputum  Gram Stain (10-09-20 @ 13:47):    No polymorphonuclear leukocytes per low power field    Few Squamous epithelial cells per low power field    Few Gram Positive Cocci in Clusters per oil power field  Final Report (10-11-20 @ 08:47):    Normal Respiratory Samaria present      Consultant(s) Notes Reviewed:  [x] YES  [ ] NO

## 2020-10-14 NOTE — PROGRESS NOTE ADULT - SUBJECTIVE AND OBJECTIVE BOX
[facility]  INFECTIOUS DISEASES   =======================================================    [employer]-[id number]  CHERI HARTMAN     Follow up: COPD exacerbation, Pneumonia    No new changes or overnight event.   No fever. Feels the same, walked in room with difficulty and NIELSON.     PAST MEDICAL & SURGICAL HISTORY:  H/O Raynaud&#x27;s syndrome  Ascorbic acid deficiency  Iron deficiency anemia  Constipation  GERD without esophagitis  Type 2 diabetes mellitus  HTN (hypertension)  Heart failure  HLD (hyperlipidemia)  History of chronic atrial fibrillation  on Eliquis  End stage COPD  on 3LNC  History of tonsillectomy    Social Hx: former heavy smoker, no ETOH or drugs     FAMILY HISTORY:  FH: myocardial infarction    Family history of CABG    Allergies  No Known Allergies    Antibiotics:  Azithromycin   Ceftriaxone     REVIEW OF SYSTEMS:  CONSTITUTIONAL:  No Fever or chills  HEENT:  No diplopia or blurred vision.  No sore throat or runny nose.  CARDIOVASCULAR:  No chest pain or SOB.  RESPIRATORY:  +cough, severe SOB  GASTROINTESTINAL:  No nausea, vomiting or diarrhea.  GENITOURINARY:  No dysuria, frequency or urgency. No Blood in urine  MUSCULOSKELETAL:  no joint aches, no muscle pain  SKIN:  No change in skin, hair or nails.  NEUROLOGIC:  No paresthesias, fasciculations, seizures or weakness.  PSYCHIATRIC:  No disorder of thought or mood.  ENDOCRINE:  No heat or cold intolerance, polyuria or polydipsia.  HEMATOLOGICAL:  No easy bruising or bleeding.     Physical Exam:  Vital Signs Last 24 Hrs  T(C): 36.8 (14 Oct 2020 07:03), Max: 36.9 (13 Oct 2020 21:08)  T(F): 98.2 (14 Oct 2020 07:03), Max: 98.5 (13 Oct 2020 21:08)  HR: 85 (14 Oct 2020 08:15) (82 - 90)  BP: 142/72 (14 Oct 2020 07:03) (120/69 - 142/72)  BP(mean): --  RR: 18 (14 Oct 2020 07:03) (18 - 18)  SpO2: 94% (14 Oct 2020 07:40) (93% - 94%)  GEN: NAD  HEENT: normocephalic and atraumatic. EOMI. PERRL.    NECK: Supple.  No lymphadenopathy   LUNGS: very poor air movement but Clear to auscultation with no wheezing or rales .  HEART: Regular rate and rhythm   ABDOMEN: Soft, nontender, and nondistended.  Positive bowel sounds.    : No CVA tenderness  EXTREMITIES: Without any cyanosis, clubbing, rash, lesions or edema.  NEUROLOGIC: grossly intact.  PSYCHIATRIC: Appropriate affect .  SKIN: No ulceration or induration present.    Labs:                 10.1   12.11 )-----------( 344      ( 14 Oct 2020 08:35 )             34.6      10-14    139  |  102  |  25<H>  ----------------------------<  141<H>  4.8   |  31  |  0.87    Ca    9.1      14 Oct 2020 08:35  Phos  3.5     10-14  Mg     2.2     10-14    Culture - Sputum (collected 10-12-20 @ 16:44)  Source: .Sputum Sputum  Gram Stain (10-12-20 @ 19:22):    No polymorphonuclear leukocytes per low power field    Rare Squamous epithelial cells per low power field    Few Gram Negative Rods per oil power field    Rare Gram positive cocci in pairs per oil power field    Culture - Sputum (collected 10-09-20 @ 06:28)  Source: .Sputum Sputum  Gram Stain (10-09-20 @ 13:47):    No polymorphonuclear leukocytes per low power field    Few Squamous epithelial cells per low power field    Few Gram Positive Cocci in Clusters per oil power field  Final Report (10-11-20 @ 08:47):    Normal Respiratory Richie present    Culture - Blood (collected 10-06-20 @ 09:23)  Source: .Blood Blood  Final Report (10-11-20 @ 10:00):    No Growth Final    Culture - Blood (collected 10-06-20 @ 09:23)  Source: .Blood Blood  Final Report (10-11-20 @ 10:00):    No Growth Final    WBC Count: 12.11 K/uL (10-14-20 @ 08:35)  WBC Count: 16.27 K/uL (10-13-20 @ 08:43)  WBC Count: 12.44 K/uL (10-12-20 @ 08:50)  WBC Count: 10.88 K/uL (10-11-20 @ 07:04)  WBC Count: 11.84 K/uL (10-10-20 @ 06:48)    Creatinine, Serum: 0.87 mg/dL (10-14-20 @ 08:35)  Creatinine, Serum: 0.93 mg/dL (10-13-20 @ 08:43)  Creatinine, Serum: 0.94 mg/dL (10-12-20 @ 08:50)  Creatinine, Serum: 0.94 mg/dL (10-11-20 @ 07:04)  Creatinine, Serum: 1.30 mg/dL (10-10-20 @ 06:48)    C-Reactive Protein, Serum: 26.32 mg/dL (10-06-20 @ 09:01)    Ferritin, Serum: 96 ng/mL (10-09-20 @ 12:19)    Procalcitonin, Serum: 0.05 ng/mL (10-10-20 @ 06:48)     COVID-19 PCR: NotDetec (10-06-20 @ 03:23)    Imaging:  < from: Xray Chest 1 View-PORTABLE IMMEDIATE (Xray Chest 1 View-PORTABLE IMMEDIATE .) (10.10.20 @ 09:54) >  EXAM:  XR CHEST PORTABLE IMMED 1V                        PROCEDURE DATE:  10/10/2020    INTERPRETATION:  INDICATION: Pneumonia; follow-up assessment.  PRIORS: 10/8/2020.  VIEWS: Portable AP radiography of the chest performed.  FINDINGS: Heart size appears within normal limits. No hilar or superior mediastinal abnormalities are identified. Infiltrates within the bilateral mid to lower lung fields are without significant change. No pleural effusion or pneumothorax is demonstrated.No mediastinal shift is noted. The visualized osseous structures appear unremarkable.  IMPRESSION: No significant interval change.    Assessment and Plan:   61y/o woman with end stage COPD on 3L O2 at home awaiting transplant at [facility], former smoker, CAD s/p stent, afib, DM2), raynaud phenomenon and recent hospitalization at [facility] for confusion and [facility] has been sent from [facility] with worsening of SOB. CT with multilobar opacities. Due to recent hospitalization and rehab stay, will cover for possible HCAP. Very high risk with a poor lung capacities.     COPD, Pneumonia  - Blood culture NGTD  - Sputum culture with normal respiratory richie  - Repeat sputum culture pending   - Legionella U ag negative   - CXR and CT with multilobar opacities  - Respiratory and pulmonary consults noted  - TTE, result noted, EF=55%, no pulmonary edema suspected   - Continue ceftriaxone 1gm daily, will stop it tomorrow.   - Azithromycin given by pulmonary for antiinflammatory effect and prophylaxis.      Will sign off please call with any question or any change in her condition.     Wagner Juarez MD  Division of Infectious Diseases   Cell [phone] between 7am and 5pm   After 5pm and weekends please call ID service at [phone].   .

## 2020-10-14 NOTE — PROGRESS NOTE ADULT - ASSESSMENT
60F w/ end stage COPD on 3LNC (pending transplant list at [facility]), former smoker, CAD s/p stent (2016), Afib on Eliquis,  uncontrolled DM2 (on insulin), raynaud phenomenon and recent hospitalization at [facility] for confusion and [facility] p/w sob, admitted for COPD exacerbation and multifocal PNA    CAD s/p stent   - Continue asa, statin  - Denies ischemic symptoms   - EKG showed SR @ 109, RBBB that is chronic    Paroxysmal Afib  - Remains NSR on tele with controlled rate. Can discontinue telemetry.   - Continue Eliquis 5mg  - Continue Cardizem CD at same dose  - Monitor electrolytes, replete to keep K>4 and Mag>2  - TTE w/ mild concentric LVH grossly nL LVSF ef 55%, mild MR    End stage COPD/Pneumonia  - Pulm following.  Per note, patient is on waiting list for transplant  - CT chest noted  - Continue steroids and antibiotics  - Continue NC and BIPAP/CPAP prn/nocturnally  - Continue incentive spirometer; please reinforce     VTE ppx  - On Eliquis    - Monitor and replete lytes, keep K>4, Mg>2.  - All other medical needs as per primary team.  - Other cardiovascular workup will depend on clinical course.  - Will continue to follow.    Lisa Soto, MS FNP, [facility]  Nurse Practitioner- Cardiology   Spectra #[phone]

## 2020-10-15 ENCOUNTER — RX RENEWAL (OUTPATIENT)
Age: 60
End: 2020-10-15

## 2020-10-15 LAB
ANION GAP SERPL CALC-SCNC: 5 MMOL/L — SIGNIFICANT CHANGE UP (ref 5–17)
BASOPHILS # BLD AUTO: 0 K/UL — SIGNIFICANT CHANGE UP (ref 0–0.2)
BASOPHILS NFR BLD AUTO: 0 % — SIGNIFICANT CHANGE UP (ref 0–2)
BUN SERPL-MCNC: 28 MG/DL — HIGH (ref 7–23)
CALCIUM SERPL-MCNC: 8.9 MG/DL — SIGNIFICANT CHANGE UP (ref 8.5–10.1)
CHLORIDE SERPL-SCNC: 101 MMOL/L — SIGNIFICANT CHANGE UP (ref 96–108)
CO2 SERPL-SCNC: 34 MMOL/L — HIGH (ref 22–31)
CREAT SERPL-MCNC: 0.98 MG/DL — SIGNIFICANT CHANGE UP (ref 0.5–1.3)
EOSINOPHIL # BLD AUTO: 0.15 K/UL — SIGNIFICANT CHANGE UP (ref 0–0.5)
EOSINOPHIL NFR BLD AUTO: 1 % — SIGNIFICANT CHANGE UP (ref 0–6)
GLUCOSE SERPL-MCNC: 140 MG/DL — HIGH (ref 70–99)
HCT VFR BLD CALC: 31.7 % — LOW (ref 34.5–45)
HGB BLD-MCNC: 9.4 G/DL — LOW (ref 11.5–15.5)
LYMPHOCYTES # BLD AUTO: 0.9 K/UL — LOW (ref 1–3.3)
LYMPHOCYTES # BLD AUTO: 6 % — LOW (ref 13–44)
MAGNESIUM SERPL-MCNC: 2.1 MG/DL — SIGNIFICANT CHANGE UP (ref 1.6–2.6)
MCHC RBC-ENTMCNC: 24.4 PG — LOW (ref 27–34)
MCHC RBC-ENTMCNC: 29.7 GM/DL — LOW (ref 32–36)
MCV RBC AUTO: 82.3 FL — SIGNIFICANT CHANGE UP (ref 80–100)
MONOCYTES # BLD AUTO: 1.05 K/UL — HIGH (ref 0–0.9)
MONOCYTES NFR BLD AUTO: 7 % — SIGNIFICANT CHANGE UP (ref 2–14)
NEUTROPHILS # BLD AUTO: 12.64 K/UL — HIGH (ref 1.8–7.4)
NEUTROPHILS NFR BLD AUTO: 82 % — HIGH (ref 43–77)
NRBC # BLD: SIGNIFICANT CHANGE UP /100 WBCS (ref 0–0)
PHOSPHATE SERPL-MCNC: 3.3 MG/DL — SIGNIFICANT CHANGE UP (ref 2.5–4.5)
PLATELET # BLD AUTO: 291 K/UL — SIGNIFICANT CHANGE UP (ref 150–400)
POTASSIUM SERPL-MCNC: 4.8 MMOL/L — SIGNIFICANT CHANGE UP (ref 3.5–5.3)
POTASSIUM SERPL-SCNC: 4.8 MMOL/L — SIGNIFICANT CHANGE UP (ref 3.5–5.3)
RBC # BLD: 3.85 M/UL — SIGNIFICANT CHANGE UP (ref 3.8–5.2)
RBC # FLD: 17 % — HIGH (ref 10.3–14.5)
SODIUM SERPL-SCNC: 140 MMOL/L — SIGNIFICANT CHANGE UP (ref 135–145)
WBC # BLD: 15.05 K/UL — HIGH (ref 3.8–10.5)
WBC # FLD AUTO: 15.05 K/UL — HIGH (ref 3.8–10.5)

## 2020-10-15 PROCEDURE — 99232 SBSQ HOSP IP/OBS MODERATE 35: CPT

## 2020-10-15 PROCEDURE — 99233 SBSQ HOSP IP/OBS HIGH 50: CPT | Mod: GC

## 2020-10-15 RX ORDER — CEFTRIAXONE 500 MG/1
1000 INJECTION, POWDER, FOR SOLUTION INTRAMUSCULAR; INTRAVENOUS ONCE
Refills: 0 | Status: COMPLETED | OUTPATIENT
Start: 2020-10-15 | End: 2020-10-15

## 2020-10-15 RX ADMIN — SENNA PLUS 2 TABLET(S): 8.6 TABLET ORAL at 21:57

## 2020-10-15 RX ADMIN — AZITHROMYCIN 500 MILLIGRAM(S): 500 TABLET, FILM COATED ORAL at 12:41

## 2020-10-15 RX ADMIN — Medication 240 MILLIGRAM(S): at 05:59

## 2020-10-15 RX ADMIN — Medication 4 UNIT(S): at 09:14

## 2020-10-15 RX ADMIN — INSULIN GLARGINE 12 UNIT(S): 100 INJECTION, SOLUTION SUBCUTANEOUS at 21:58

## 2020-10-15 RX ADMIN — Medication 81 MILLIGRAM(S): at 12:41

## 2020-10-15 RX ADMIN — PANTOPRAZOLE SODIUM 40 MILLIGRAM(S): 20 TABLET, DELAYED RELEASE ORAL at 05:59

## 2020-10-15 RX ADMIN — BUDESONIDE AND FORMOTEROL FUMARATE DIHYDRATE 2 PUFF(S): 160; 4.5 AEROSOL RESPIRATORY (INHALATION) at 06:00

## 2020-10-15 RX ADMIN — APIXABAN 5 MILLIGRAM(S): 2.5 TABLET, FILM COATED ORAL at 17:25

## 2020-10-15 RX ADMIN — Medication 1000 UNIT(S): at 12:40

## 2020-10-15 RX ADMIN — APIXABAN 5 MILLIGRAM(S): 2.5 TABLET, FILM COATED ORAL at 05:59

## 2020-10-15 RX ADMIN — Medication 30 MILLIGRAM(S): at 05:59

## 2020-10-15 RX ADMIN — Medication 5 MILLILITER(S): at 06:01

## 2020-10-15 RX ADMIN — ATORVASTATIN CALCIUM 80 MILLIGRAM(S): 80 TABLET, FILM COATED ORAL at 21:56

## 2020-10-15 RX ADMIN — CEFTRIAXONE 100 MILLIGRAM(S): 500 INJECTION, POWDER, FOR SOLUTION INTRAMUSCULAR; INTRAVENOUS at 17:24

## 2020-10-15 RX ADMIN — TIOTROPIUM BROMIDE 1 CAPSULE(S): 18 CAPSULE ORAL; RESPIRATORY (INHALATION) at 21:57

## 2020-10-15 RX ADMIN — Medication 600 MILLIGRAM(S): at 17:25

## 2020-10-15 RX ADMIN — FENTANYL CITRATE 1 PATCH: 50 INJECTION INTRAVENOUS at 13:39

## 2020-10-15 RX ADMIN — POLYETHYLENE GLYCOL 3350 17 GRAM(S): 17 POWDER, FOR SOLUTION ORAL at 12:42

## 2020-10-15 RX ADMIN — FENTANYL CITRATE 1 PATCH: 50 INJECTION INTRAVENOUS at 07:15

## 2020-10-15 RX ADMIN — MONTELUKAST 10 MILLIGRAM(S): 4 TABLET, CHEWABLE ORAL at 21:56

## 2020-10-15 RX ADMIN — Medication 500 MICROGRAM(S): at 08:17

## 2020-10-15 RX ADMIN — Medication 4: at 12:39

## 2020-10-15 RX ADMIN — Medication 325 MILLIGRAM(S): at 12:41

## 2020-10-15 RX ADMIN — Medication 2: at 17:23

## 2020-10-15 RX ADMIN — Medication 4 UNIT(S): at 12:39

## 2020-10-15 RX ADMIN — Medication 500 MICROGRAM(S): at 14:10

## 2020-10-15 RX ADMIN — Medication 500 MICROGRAM(S): at 00:33

## 2020-10-15 RX ADMIN — Medication 600 MILLIGRAM(S): at 05:59

## 2020-10-15 RX ADMIN — Medication 4 UNIT(S): at 17:24

## 2020-10-15 RX ADMIN — Medication 500 MICROGRAM(S): at 20:06

## 2020-10-15 RX ADMIN — Medication 1 TABLET(S): at 12:40

## 2020-10-15 NOTE — PROGRESS NOTE ADULT - SUBJECTIVE AND OBJECTIVE BOX
Patient is a 60y old  Female who presents with a chief complaint of COPD exacerbation, PNA (08 Oct 2020 11:37)       HPI:  60F w/ end stage COPD on 3LNC (pending transplant list at [facility]), former smoker, CAD s/p stent (2016), afib on eliquis, uncontrolled DM2 (on insulin), raynaud phenomenon and recent hospitalization at [facility] for confusion and [facility] Patient states that she has had worsening shortness of breath for past two days. Unable to obtain comprehensive history due to dyspnea. Patient denies fever, chills, sore throat, cough, chest pain, palpitations, abd pain, n/v. Currently feels short of breath even after receiving duonebs and steroids in the ED. Unable to keep mask on during interview and lay back in stretcher due to subjective sob. Patient repeatedly stating that it is too hot in her room and fanning herself with a paper. Per chart, Dr. Mathew (PCP) has chart notes regarding possible hallucinations. Would like her home medications now but otherwise has no acute complaints.  Has not seen nephrologist.  Denies any N/V.  SOB improving.  States she is urinating well.  Denies any urinary retention.  Denies any supplement or NSAID usage.         Getting breathing treatment  No new complaints  Still has SOB and NIELSON    PAST MEDICAL & SURGICAL HISTORY:  H/O Raynaud&#x27;s syndrome    Ascorbic acid deficiency    Iron deficiency anemia    Constipation    GERD without esophagitis    Type 2 diabetes mellitus    HTN (hypertension)    Heart failure    HLD (hyperlipidemia)    History of chronic atrial fibrillation  on Eliquis    End stage COPD  on 3LNC    Atrial fibrillation    Hyperlipemia    Chronic sinusitis    Raynaud phenomenon    HTN (hypertension)    DM (diabetes mellitus)    Claustrophobia    COPD (chronic obstructive pulmonary disease)    History of tonsillectomy    S/P tonsillectomy         FAMILY HISTORY:  FH: myocardial infarction    Family history of CABG    FH: MI (myocardial infarction)    FH: CABG (coronary artery bypass surgery)    NC    Social History:Non smoker    MEDICATIONS  (STANDING):  apixaban 5 milliGRAM(s) Oral every 12 hours  ascorbic acid 500 milliGRAM(s) Oral daily  aspirin  chewable 81 milliGRAM(s) Oral daily  atorvastatin 80 milliGRAM(s) Oral at bedtime  azithromycin  IVPB 500 milliGRAM(s) IV Intermittent every 24 hours  budesonide 160 MICROgram(s)/formoterol 4.5 MICROgram(s) Inhaler 2 Puff(s) Inhalation two times a day  cefepime   IVPB 2000 milliGRAM(s) IV Intermittent every 12 hours  cholecalciferol 1000 Unit(s) Oral daily  dextrose 5%. 1000 milliLiter(s) (50 mL/Hr) IV Continuous <Continuous>  dextrose 50% Injectable 12.5 Gram(s) IV Push once  dextrose 50% Injectable 25 Gram(s) IV Push once  dextrose 50% Injectable 25 Gram(s) IV Push once  ferrous    sulfate 325 milliGRAM(s) Oral daily  insulin glargine Injectable (LANTUS) 10 Unit(s) SubCutaneous at bedtime  insulin lispro (HumaLOG) corrective regimen sliding scale   SubCutaneous three times a day before meals  insulin lispro Injectable (HumaLOG) 3 Unit(s) SubCutaneous three times a day before meals  montelukast 10 milliGRAM(s) Oral at bedtime  multivitamin 1 Tablet(s) Oral daily  pantoprazole    Tablet 40 milliGRAM(s) Oral before breakfast  polyethylene glycol 3350 17 Gram(s) Oral daily  potassium chloride    Tablet ER 40 milliEquivalent(s) Oral every 4 hours  senna 2 Tablet(s) Oral at bedtime  tiotropium 18 MICROgram(s) Capsule 1 Capsule(s) Inhalation daily    MEDICATIONS  (PRN):  acetaminophen   Tablet .. 650 milliGRAM(s) Oral every 6 hours PRN Mild Pain (1 - 3)  bisacodyl Suppository 10 milliGRAM(s) Rectal daily PRN Constipation  dextrose 40% Gel 15 Gram(s) Oral once PRN Blood Glucose LESS THAN 70 milliGRAM(s)/deciliter  glucagon  Injectable 1 milliGRAM(s) IntraMuscular once PRN Glucose LESS THAN 70 milligrams/deciliter  guaiFENesin   Syrup  (Sugar-Free) 100 milliGRAM(s) Oral every 6 hours PRN Cough  lactulose Syrup 15 Gram(s) Oral two times a day PRN constipation  levalbuterol Inhalation 0.63 milliGRAM(s) Inhalation every 4 hours PRN shortness of breath and/or wheezing  oxyCODONE    IR 5 milliGRAM(s) Oral every 6 hours PRN Moderate Pain (4 - 6)   Meds reviewed    Allergies    No Known Allergies    Intolerances    albuterol (Unknown)       REVIEW OF SYSTEMS:    CONSTITUTIONAL:  No weight loss   EYES: No eye pain, visual disturbances, or discharge  ENMT:  No difficulty hearing, tinnitus, vertigo; No sinus or throat pain  NECK: No pain or stiffness  BREASTS: No pain, masses, or nipple discharge  RESPIRATORY: +SOB. +NIELSON  CARDIOVASCULAR: No chest pain, palpitations, dizziness,   GASTROINTESTINAL: No abdominal or epigastric pain. No nausea, vomiting, or hematemesis;  GENITOURINARY: No dysuria, frequency, hematuria, or incontinence  NEUROLOGICAL: No headaches, memory loss, loss of strength, numbness, or tremors  SKIN: no Diffuse erythema, no blisters  LYMPH NODES: No enlarged glands  ENDOCRINE: No heat or cold intolerance; No hair loss  MUSCULOSKELETAL: No joint pain or swelling   PSYCHIATRIC: No depression, anxiety, mood swings, or difficulty sleeping  ALLERGY AND IMMUNOLOGIC: No hives or eczema      ICU Vital Signs Last 24 Hrs  T(C): 36.8 (15 Oct 2020 05:40), Max: 36.9 (14 Oct 2020 20:37)  T(F): 98.2 (15 Oct 2020 05:40), Max: 98.4 (14 Oct 2020 20:37)  HR: 82 (15 Oct 2020 08:17) (74 - 83)  BP: 138/79 (15 Oct 2020 05:40) (129/68 - 138/79)  BP(mean): --  ABP: --  ABP(mean): --  RR: 20 (15 Oct 2020 05:40) (18 - 20)  SpO2: 90% (15 Oct 2020 08:17) (90% - 99%)          PHYSICAL EXAM:    GENERAL: No distress  HEAD:  Atraumatic, Normocephalic  EYES: EOMI, conjunctiva and sclera clear  ENMT: No drainage from nares, no drainage from pinna  NECK: Supple, neck  veins full  NERVOUS SYSTEM:  Alert & Oriented X3, Good concentration; Motor Strength wnl upper and lower extremities  CHEST/LUNG: Decreased BS, B/L rales, no rhonchi, Mild wheezing  HEART: Regular rate and rhythm; No murmurs, rubs, or gallops  ABDOMEN: Soft, Nontender, Nondistended; Bowel sounds present,  EXTREMITIES: trace Edema  SKIN: No rashes or lesions, pale      LABS:                                      10.1   12.11 )-----------( 344      ( 14 Oct 2020 08:35 )             34.6     10-14    139  |  102  |  25<H>  ----------------------------<  141<H>  4.8   |  31  |  0.87    Ca    9.1      14 Oct 2020 08:35  Phos  3.5     10-14  Mg     2.2     10-14          Magnesium, Serum: 2.2 mg/dL (10-14 @ 08:35)  Phosphorus Level, Serum: 3.5 mg/dL (10-14 @ 08:35)                       Patient is a 60y old  Female who presents with a chief complaint of COPD exacerbation, PNA (08 Oct 2020 11:37)       HPI:  60F w/ end stage COPD on 3LNC (pending transplant list at [facility]), former smoker, CAD s/p stent (2016), afib on eliquis, uncontrolled DM2 (on insulin), raynaud phenomenon and recent hospitalization at [facility] for confusion and [facility] Patient states that she has had worsening shortness of breath for past two days. Unable to obtain comprehensive history due to dyspnea. Patient denies fever, chills, sore throat, cough, chest pain, palpitations, abd pain, n/v. Currently feels short of breath even after receiving duonebs and steroids in the ED. Unable to keep mask on during interview and lay back in stretcher due to subjective sob. Patient repeatedly stating that it is too hot in her room and fanning herself with a paper. Per chart, Dr. Mathew (PCP) has chart notes regarding possible hallucinations. Would like her home medications now but otherwise has no acute complaints.  Has not seen nephrologist.  Denies any N/V.  SOB improving.  States she is urinating well.  Denies any urinary retention.  Denies any supplement or NSAID usage.         No new complaints  Still has SOB and NIELSON    PAST MEDICAL & SURGICAL HISTORY:  H/O Raynaud&#x27;s syndrome    Ascorbic acid deficiency    Iron deficiency anemia    Constipation    GERD without esophagitis    Type 2 diabetes mellitus    HTN (hypertension)    Heart failure    HLD (hyperlipidemia)    History of chronic atrial fibrillation  on Eliquis    End stage COPD  on 3LNC    Atrial fibrillation    Hyperlipemia    Chronic sinusitis    Raynaud phenomenon    HTN (hypertension)    DM (diabetes mellitus)    Claustrophobia    COPD (chronic obstructive pulmonary disease)    History of tonsillectomy    S/P tonsillectomy         FAMILY HISTORY:  FH: myocardial infarction    Family history of CABG    FH: MI (myocardial infarction)    FH: CABG (coronary artery bypass surgery)    NC    Social History:Non smoker    MEDICATIONS  (STANDING):  apixaban 5 milliGRAM(s) Oral every 12 hours  ascorbic acid 500 milliGRAM(s) Oral daily  aspirin  chewable 81 milliGRAM(s) Oral daily  atorvastatin 80 milliGRAM(s) Oral at bedtime  azithromycin  IVPB 500 milliGRAM(s) IV Intermittent every 24 hours  budesonide 160 MICROgram(s)/formoterol 4.5 MICROgram(s) Inhaler 2 Puff(s) Inhalation two times a day  cefepime   IVPB 2000 milliGRAM(s) IV Intermittent every 12 hours  cholecalciferol 1000 Unit(s) Oral daily  dextrose 5%. 1000 milliLiter(s) (50 mL/Hr) IV Continuous <Continuous>  dextrose 50% Injectable 12.5 Gram(s) IV Push once  dextrose 50% Injectable 25 Gram(s) IV Push once  dextrose 50% Injectable 25 Gram(s) IV Push once  ferrous    sulfate 325 milliGRAM(s) Oral daily  insulin glargine Injectable (LANTUS) 10 Unit(s) SubCutaneous at bedtime  insulin lispro (HumaLOG) corrective regimen sliding scale   SubCutaneous three times a day before meals  insulin lispro Injectable (HumaLOG) 3 Unit(s) SubCutaneous three times a day before meals  montelukast 10 milliGRAM(s) Oral at bedtime  multivitamin 1 Tablet(s) Oral daily  pantoprazole    Tablet 40 milliGRAM(s) Oral before breakfast  polyethylene glycol 3350 17 Gram(s) Oral daily  potassium chloride    Tablet ER 40 milliEquivalent(s) Oral every 4 hours  senna 2 Tablet(s) Oral at bedtime  tiotropium 18 MICROgram(s) Capsule 1 Capsule(s) Inhalation daily    MEDICATIONS  (PRN):  acetaminophen   Tablet .. 650 milliGRAM(s) Oral every 6 hours PRN Mild Pain (1 - 3)  bisacodyl Suppository 10 milliGRAM(s) Rectal daily PRN Constipation  dextrose 40% Gel 15 Gram(s) Oral once PRN Blood Glucose LESS THAN 70 milliGRAM(s)/deciliter  glucagon  Injectable 1 milliGRAM(s) IntraMuscular once PRN Glucose LESS THAN 70 milligrams/deciliter  guaiFENesin   Syrup  (Sugar-Free) 100 milliGRAM(s) Oral every 6 hours PRN Cough  lactulose Syrup 15 Gram(s) Oral two times a day PRN constipation  levalbuterol Inhalation 0.63 milliGRAM(s) Inhalation every 4 hours PRN shortness of breath and/or wheezing  oxyCODONE    IR 5 milliGRAM(s) Oral every 6 hours PRN Moderate Pain (4 - 6)   Meds reviewed    Allergies    No Known Allergies    Intolerances    albuterol (Unknown)       REVIEW OF SYSTEMS:    CONSTITUTIONAL:  No weight loss   EYES: No eye pain, visual disturbances, or discharge  ENMT:  No difficulty hearing, tinnitus, vertigo; No sinus or throat pain  NECK: No pain or stiffness  BREASTS: No pain, masses, or nipple discharge  RESPIRATORY: +SOB. +NIELSON  CARDIOVASCULAR: No chest pain, palpitations, dizziness,   GASTROINTESTINAL: No abdominal or epigastric pain. No nausea, vomiting, or hematemesis;  GENITOURINARY: No dysuria, frequency, hematuria, or incontinence  NEUROLOGICAL: No headaches, memory loss, loss of strength, numbness, or tremors  SKIN: no Diffuse erythema, no blisters  LYMPH NODES: No enlarged glands  ENDOCRINE: No heat or cold intolerance; No hair loss  MUSCULOSKELETAL: No joint pain or swelling   PSYCHIATRIC: No depression, anxiety, mood swings, or difficulty sleeping  ALLERGY AND IMMUNOLOGIC: No hives or eczema      ICU Vital Signs Last 24 Hrs  T(C): 36.8 (15 Oct 2020 05:40), Max: 36.9 (14 Oct 2020 20:37)  T(F): 98.2 (15 Oct 2020 05:40), Max: 98.4 (14 Oct 2020 20:37)  HR: 82 (15 Oct 2020 08:17) (74 - 83)  BP: 138/79 (15 Oct 2020 05:40) (129/68 - 138/79)  BP(mean): --  ABP: --  ABP(mean): --  RR: 20 (15 Oct 2020 05:40) (18 - 20)  SpO2: 90% (15 Oct 2020 08:17) (90% - 99%)          PHYSICAL EXAM:    GENERAL: No distress  HEAD:  Atraumatic, Normocephalic  EYES: EOMI, conjunctiva and sclera clear  ENMT: No drainage from nares, no drainage from pinna  NECK: Supple, neck  veins full  NERVOUS SYSTEM:  Alert & Oriented X3, Good concentration; Motor Strength wnl upper and lower extremities  CHEST/LUNG: Decreased BS, B/L rales, no rhonchi, Mild wheezing  HEART: Regular rate and rhythm; No murmurs, rubs, or gallops  ABDOMEN: Soft, Nontender, Nondistended; Bowel sounds present,  EXTREMITIES: trace Edema  SKIN: No rashes or lesions, pale      LABS:                                      10.1   12.11 )-----------( 344      ( 14 Oct 2020 08:35 )             34.6     10-14    139  |  102  |  25<H>  ----------------------------<  141<H>  4.8   |  31  |  0.87    Ca    9.1      14 Oct 2020 08:35  Phos  3.5     10-14  Mg     2.2     10-14          Magnesium, Serum: 2.2 mg/dL (10-14 @ 08:35)  Phosphorus Level, Serum: 3.5 mg/dL (10-14 @ 08:35)

## 2020-10-15 NOTE — PROGRESS NOTE ADULT - SUBJECTIVE AND OBJECTIVE BOX
[facility] -- Adriana Gonzales, Funmilayo Fuentes, Dylan Cormier Savella, Goodger: Office # [phone]    Follow Up:  SOB    Subjective/Observations:  Patient seen and examined. Patient awake, alert, resting in bed. No complaints of chest pain, dyspnea, palpitations or dizziness. No signs of orthopnea or PND. Tolerating O2 via nasal cannula. BiPAP at night. Still c/o severe NIELSON with any kind of activities, Breathing is better, but not back to baseline     REVIEW OF SYSTEMS: All review of systems is negative for eye, ENT, GI, , allergic, dermatologic, musculoskeletal and neurologic except as described above    PAST MEDICAL & SURGICAL HISTORY:  Ascorbic acid deficiency  Iron deficiency anemia  Constipation  GERD without esophagitis  Type 2 diabetes mellitus  HTN (hypertension)  Heart failure  End stage COPD  on 3LNC  Atrial fibrillation  Hyperlipemia  Chronic sinusitis  Raynaud phenomenon  Claustrophobia  COPD (chronic obstructive pulmonary disease)  S/P tonsillectomy    MEDICATIONS  (STANDING):  apixaban 5 milliGRAM(s) Oral every 12 hours  aspirin  chewable 81 milliGRAM(s) Oral daily  atorvastatin 80 milliGRAM(s) Oral at bedtime  azithromycin   Tablet 500 milliGRAM(s) Oral daily  Biotene Dry Mouth Oral Rinse 5 milliLiter(s) Swish and Spit two times a day  budesonide 160 MICROgram(s)/formoterol 4.5 MICROgram(s) Inhaler 2 Puff(s) Inhalation two times a day  cefTRIAXone   IVPB 1000 milliGRAM(s) IV Intermittent every 24 hours  cholecalciferol 1000 Unit(s) Oral daily  dextrose 5%. 1000 milliLiter(s) (50 mL/Hr) IV Continuous <Continuous>  dextrose 50% Injectable 12.5 Gram(s) IV Push once  dextrose 50% Injectable 25 Gram(s) IV Push once  dextrose 50% Injectable 25 Gram(s) IV Push once  diltiazem    milliGRAM(s) Oral daily  fentaNYL   Patch  12 MICROgram(s)/Hr 1 Patch Transdermal every 72 hours  ferrous    sulfate 325 milliGRAM(s) Oral daily  guaiFENesin  milliGRAM(s) Oral every 12 hours  insulin glargine Injectable (LANTUS) 12 Unit(s) SubCutaneous at bedtime  insulin lispro (HumaLOG) corrective regimen sliding scale   SubCutaneous three times a day before meals  insulin lispro (HumaLOG) corrective regimen sliding scale   SubCutaneous at bedtime  insulin lispro Injectable (HumaLOG) 4 Unit(s) SubCutaneous three times a day before meals  ipratropium    for Nebulization 500 MICROGram(s) Nebulizer every 6 hours  montelukast 10 milliGRAM(s) Oral at bedtime  multivitamin 1 Tablet(s) Oral daily  pantoprazole    Tablet 40 milliGRAM(s) Oral before breakfast  polyethylene glycol 3350 17 Gram(s) Oral daily  predniSONE   Tablet 30 milliGRAM(s) Oral daily  senna 2 Tablet(s) Oral at bedtime  tiotropium 18 MICROgram(s) Capsule 1 Capsule(s) Inhalation daily    MEDICATIONS  (PRN):  acetaminophen   Tablet .. 650 milliGRAM(s) Oral every 6 hours PRN Mild Pain (1 - 3)  bisacodyl Suppository 10 milliGRAM(s) Rectal daily PRN Constipation  dextrose 40% Gel 15 Gram(s) Oral once PRN Blood Glucose LESS THAN 70 milliGRAM(s)/deciliter  glucagon  Injectable 1 milliGRAM(s) IntraMuscular once PRN Glucose LESS THAN 70 milligrams/deciliter  lactulose Syrup 15 Gram(s) Oral two times a day PRN constipation    Allergies  No Known Allergies    Intolerances  albuterol (Unknown)    Vital Signs Last 24 Hrs  T(C): 36.8 (15 Oct 2020 05:40), Max: 36.9 (14 Oct 2020 20:37)  T(F): 98.2 (15 Oct 2020 05:40), Max: 98.4 (14 Oct 2020 20:37)  HR: 82 (15 Oct 2020 08:17) (74 - 83)  BP: 138/79 (15 Oct 2020 05:40) (129/68 - 138/79)  BP(mean): --  RR: 20 (15 Oct 2020 05:40) (18 - 20)  SpO2: 90% (15 Oct 2020 08:17) (90% - 99%)    TELE: SR 70-80s   PHYSICAL EXAM:  Appearance: NAD, no distress, alert, Well developed   HEENT: Moist Mucous Membranes, Anicteric  Cardiovascular: Regular rate and rhythm, Normal S1 S2, No JVD, No murmurs, No rubs, gallops or clicks  Respiratory: Non-labored, Clear to auscultation, Diminished at bases No rales, No rhonchi, No wheezing.   Gastrointestinal:  Soft, Non-tender, + BS  Neurologic: Non-focal  Skin: Warm and dry, No visible rashes or ulcers, No ecchymosis, No cyanosis  Musculoskeletal: No clubbing, No cyanosis, No joint swelling/tenderness  Psychiatry: Mood & affect appropriate  Lymph: No peripheral edema.     LABS: All Labs Reviewed:                        9.4    15.05 )-----------( 291      ( 15 Oct 2020 09:09 )             31.7                         10.1   12.11 )-----------( 344      ( 14 Oct 2020 08:35 )             34.6                         9.2    16.27 )-----------( 372      ( 13 Oct 2020 08:43 )             31.6     14 Oct 2020 08:35    139    |  102    |  25     ----------------------------<  141    4.8     |  31     |  0.87   13 Oct 2020 08:43    140    |  100    |  32     ----------------------------<  208    4.7     |  35     |  0.93     Ca    9.1        14 Oct 2020 08:35  Ca    8.9        13 Oct 2020 08:43  Phos  3.5       14 Oct 2020 08:35  Phos  2.9       13 Oct 2020 08:43  Mg     2.2       14 Oct 2020 08:35  Mg     2.2       13 Oct 2020 08:43      12 Lead ECG:   Ventricular Rate 109 BPM  Atrial Rate 109 BPM  P-R Interval 150 ms  QRS Duration 136 ms  Q-T Interval 390 ms  QTC Calculation(Bazett) 525 ms  P Axis 56 degrees  R Axis -16 degrees  T Axis 57 degrees  Diagnosis Line Sinus tachycardia with premature supraventricular complexes  Right bundle branch block  Abnormal ECG  Confirmed by LUIS ENRIQUE CORMIER ([id number]) on 10/8/2020 11:41:04 AM (10-08-20 @ 08:51)    < from: TTE Echo Complete w/o Contrast w/ Doppler (10.06.20 @ 17:10) >  Dimensions:  LA 3.7       Normal Values: 2.0 - 4.0 cm  Ao 2.7        Normal Values: 2.0 - 3.8 cm  SEPTUM 1.4       Normal Values: 0.6 - 1.2 cm  PWT 1.3       Normal Values: 0.6 - 1.1 cm  LVIDd 3.9         Normal Values: 3.0 - 5.6 cm  LVIDs 2.8         Normal Values: 1.8 - 4.0 cm    OBSERVATIONS:  Technically difficult and limited study  Mitral Valve: Thickened leaflets, mild MR.  Aortic Valve/Aorta: Aortic valve is not well-visualized, grossly normal function without severe pathology  Tricuspid Valve: normal with trace TR.  Pulmonic Valve: Not well-visualized  Left Atrium: normal  Right Atrium: Not well-visualized  Left Ventricle: normal LV size and systolic function, estimated LVEF of 55%. Mild LVH. Endocardium is not well-visualized, cannot rule out segmental dysfunction  Right Ventricle: Grossly normal size and systolic function.  Pericardium/Pleura: normal, no significant pericardial effusion.  Pulmonary/RV Pressure: estimated PA systolic pressure of 15.7 mmHg assuming an RA pressure of 3 mmHg.  LV diastolic dysfunction is present    Conclusion:  Technically difficult and limited study  Mild concentric LVH with grossly normal left ventricular systolic function, estimated LVEF of 55%.  Grossly normal RV size and systolic function.  Aortic valve is not well-visualized, grossly normal function without severe pathology  Mild MR  Trace TR.  No significant pericardial effusion.    < end of copied text >    < from: Cardiac Cath Lab - Adult (03.24.17 @ 11:16) >  VENTRICLES: Global left ventricular function was normal. EF estimated was  65 %.  CORONARY VESSELS: The coronary circulation is right dominant.  LM:   --  Mid left main: There was a 30 % stenosis.  LAD:   --  Ostial LAD: There was a 40 % stenosis.  CX:   --  Circumflex: Normal.  RCA:   --  RCA: Normal.  COMPLICATIONS: There were no complications.  DIAGNOSTIC RECOMMENDATIONS: The patient should continue with the present  medications.  < end of copied text >

## 2020-10-15 NOTE — PROGRESS NOTE ADULT - PROBLEM SELECTOR PLAN 1
likely multifactorial from multifocal PNA and COPD exacerbation  - slow clinical improvement  - currently on 4L NC with BIPAP qhs   - maintain O2 >/ 88, will slowly titrate  - Continue spiriva, symbicort, montelukast, inhaler PRN, atrovent   - Continue ceftriaxone (last day 10/15) and azithromycin (home med)  - Pulm (Dr. Rojas): continue steroid taper, abx, atrovent and mucinex  - ID, Dr. Juarez on board--recs appreciated  - Blood culture negative  - Sputum cx w/ normal respiratory richie  - f/u repeat sputum cx  - apprec palliative care support in management due to complexity of care and nature of history  - biotene PRN for sore throat/hoarseness; Mucinex likely multifactorial from multifocal PNA and COPD exacerbation  - slow clinical improvement  - currently on 4L NC with BIPAP qhs   - maintain O2 >/ 88, will slowly titrate  - Continue spiriva, symbicort, montelukast, inhaler PRN, atrovent   - Continue ceftriaxone (last day today) and azithromycin (home med)  - Pulm (Dr. Rojsa)--recs appreciated  - ID, Dr. Juarez on board--recs appreciated  - Blood culture negative  - Sputum cx w/ normal respiratory richie  - f/u repeat sputum cx  - apprec palliative care support in management due to complexity of care and nature of history  - biotene PRN for sore throat/hoarseness; Mucinex likely multifactorial from multifocal PNA and COPD exacerbation  - slow clinical improvement  - currently on 4L NC with BIPAP qhs   - maintain O2 >/ 88, will slowly titrate  - Continue spiriva, symbicort, montelukast, inhaler PRN, atrovent   - Continue ceftriaxone (last day today) and azithromycin (home med)  - Pulm (Dr. Rojas)--recs appreciated  - ID, Dr. Juarez on board--recs appreciated  - Blood culture negative  - Sputum cx w/ normal respiratory richie  - f/u repeat sputum cx  - Patient nonagreeable to JACOBO, plan to DC home w/ home PT likely multifactorial from multifocal PNA and COPD exacerbation  - slow clinical improvement  - currently on 4L NC with BIPAP qhs   - maintain O2 >/ 88, will slowly titrate  - Continue spiriva, symbicort, montelukast, inhaler PRN, atrovent   - Continue ceftriaxone (last day today) and azithromycin (home med)  - Pulm (Dr. Rojas)--recs appreciated  - ID, Dr. Juarez on board--recs appreciated  - Blood culture negative, Sputum cx w/ normal respiratory richie  - f/u repeat sputum cx  - plan to DC home w/ home PT

## 2020-10-15 NOTE — PROGRESS NOTE ADULT - PROBLEM SELECTOR PLAN 2
on 3LNC at rehab, currently requiring 4L nc with BIPAP, wean as tolerated  - continue spiriva, symbicort, montelukast, inhaler PRN, atrovent   -theophylline held given tachycardia and brief afib  - BIPAP qhs and PRN  -of note pt is not on transplant list yet, is planning to go on list at [facility] if medically stable and improved

## 2020-10-15 NOTE — PROGRESS NOTE ADULT - SUBJECTIVE AND OBJECTIVE BOX
CHERI HARTMAN    \[facility]\"" 3WES 362 W1    Patient is a 60y old  Female who presents with a chief complaint of COPD exacerbation, PNA (15 Oct 2020 11:06)       Allergies    No Known Allergies    Intolerances    albuterol (Unknown)      HPI:  60F w/ end stage COPD on 3LNC (pending transplant list at [facility]), former smoker, CAD s/p stent (2016), afib on eliquis, uncontrolled DM2 (on insulin), raynaud phenomenon and recent hospitalization at [facility] for confusion and [facility] p/w sob. [facility] Patient states that she has had worsening shortness of breath for past two days. Unable to obtain comprehensive history due to dyspnea. Patient denies fever, chills, sore throat, cough, chest pain, palpitations, abd pain, n/v. Currently feels short of breath even after receiving duonebs and steroids in the ED. Unable to keep mask on during interview and lay back in stretcher due to subjective sob. Patient repeatedly stating that it is too hot in her room and fanning herself with a paper. Per chart, Dr. Mathew (PCP) has chart notes regarding possible hallucinations. Would like her home medications now but otherwise has no acute complaints.    In the ED, VS T97.7F  /78 RR 21 SpO2 94% on 4LNC. Labs significant for mild leukocytosis of 11.41, H/H 9.9/30.5, thrombophilia of 435. CO2 35, albumin 2.4.   CXR done showing b/l patchy infiltrates, official read pending  CT chest done showing multifocal PNA and reactive mediastinal lymphadenopathy  EKG read limited by artifact, sinus tachycardia with RBBB and premature supraventricular complexes (06 Oct 2020 04:55)      PAST MEDICAL & SURGICAL HISTORY:  Ascorbic acid deficiency    Iron deficiency anemia    Constipation    GERD without esophagitis    Type 2 diabetes mellitus    HTN (hypertension)    Heart failure    End stage COPD  on 3LNC    Atrial fibrillation    Hyperlipemia    Chronic sinusitis    Raynaud phenomenon    Claustrophobia    COPD (chronic obstructive pulmonary disease)    S/P tonsillectomy        FAMILY HISTORY:  FH: myocardial infarction    Family history of CABG    FH: MI (myocardial infarction)    FH: CABG (coronary artery bypass surgery)          MEDICATIONS   acetaminophen   Tablet .. 650 milliGRAM(s) Oral every 6 hours PRN  apixaban 5 milliGRAM(s) Oral every 12 hours  aspirin  chewable 81 milliGRAM(s) Oral daily  atorvastatin 80 milliGRAM(s) Oral at bedtime  azithromycin   Tablet 500 milliGRAM(s) Oral daily  Biotene Dry Mouth Oral Rinse 5 milliLiter(s) Swish and Spit two times a day  bisacodyl Suppository 10 milliGRAM(s) Rectal daily PRN  budesonide 160 MICROgram(s)/formoterol 4.5 MICROgram(s) Inhaler 2 Puff(s) Inhalation two times a day  cefTRIAXone   IVPB 1000 milliGRAM(s) IV Intermittent once  cholecalciferol 1000 Unit(s) Oral daily  dextrose 40% Gel 15 Gram(s) Oral once PRN  dextrose 5%. 1000 milliLiter(s) IV Continuous <Continuous>  dextrose 50% Injectable 12.5 Gram(s) IV Push once  dextrose 50% Injectable 25 Gram(s) IV Push once  dextrose 50% Injectable 25 Gram(s) IV Push once  diltiazem    milliGRAM(s) Oral daily  fentaNYL   Patch  12 MICROgram(s)/Hr 1 Patch Transdermal every 72 hours  ferrous    sulfate 325 milliGRAM(s) Oral daily  glucagon  Injectable 1 milliGRAM(s) IntraMuscular once PRN  guaiFENesin  milliGRAM(s) Oral every 12 hours  insulin glargine Injectable (LANTUS) 12 Unit(s) SubCutaneous at bedtime  insulin lispro (HumaLOG) corrective regimen sliding scale   SubCutaneous at bedtime  insulin lispro (HumaLOG) corrective regimen sliding scale   SubCutaneous three times a day before meals  insulin lispro Injectable (HumaLOG) 4 Unit(s) SubCutaneous three times a day before meals  ipratropium    for Nebulization 500 MICROGram(s) Nebulizer every 6 hours  lactulose Syrup 15 Gram(s) Oral two times a day PRN  montelukast 10 milliGRAM(s) Oral at bedtime  multivitamin 1 Tablet(s) Oral daily  pantoprazole    Tablet 40 milliGRAM(s) Oral before breakfast  polyethylene glycol 3350 17 Gram(s) Oral daily  predniSONE   Tablet 30 milliGRAM(s) Oral daily  senna 2 Tablet(s) Oral at bedtime  tiotropium 18 MICROgram(s) Capsule 1 Capsule(s) Inhalation daily      Vital Signs Last 24 Hrs  T(C): 36.8 (15 Oct 2020 05:40), Max: 36.9 (14 Oct 2020 20:37)  T(F): 98.2 (15 Oct 2020 05:40), Max: 98.4 (14 Oct 2020 20:37)  HR: 82 (15 Oct 2020 08:17) (74 - 83)  BP: 138/79 (15 Oct 2020 05:40) (129/68 - 138/79)  BP(mean): --  RR: 20 (15 Oct 2020 05:40) (18 - 20)  SpO2: 90% (15 Oct 2020 08:17) (90% - 99%)      10-15-20 @ 07:01  -  10-15-20 @ 12:59  --------------------------------------------------------  IN: 240 mL / OUT: 0 mL / NET: 240 mL            LABS:                        9.4    15.05 )-----------( 291      ( 15 Oct 2020 09:09 )             31.7     10-15    140  |  101  |  28<H>  ----------------------------<  140<H>  4.8   |  34<H>  |  0.98    Ca    8.9      15 Oct 2020 09:09  Phos  3.3     10-15  Mg     2.1     10-15                WBC:  WBC Count: 15.05 K/uL (10-15 @ 09:09)  WBC Count: 12.11 K/uL (10-14 @ 08:35)  WBC Count: 16.27 K/uL (10-13 @ 08:43)  WBC Count: 12.44 K/uL (10-12 @ 08:50)      MICROBIOLOGY:  RECENT CULTURES:  10-12 .Sputum Sputum XXXX   No polymorphonuclear leukocytes per low power field  Rare Squamous epithelial cells per low power field  Few Gram Negative Rods per oil power field  Rare Gram positive cocci in pairs per oil power field   Rare Mold like fungus  Normal Respiratory Samaria present    10-09 .Sputum Sputum XXXX   No polymorphonuclear leukocytes per low power field  Few Squamous epithelial cells per low power field  Few Gram Positive Cocci in Clusters per oil power field   Normal Respiratory Samaria present                    Sodium:  Sodium, Serum: 140 mmol/L (10-15 @ 09:09)  Sodium, Serum: 139 mmol/L (10-14 @ 08:35)  Sodium, Serum: 140 mmol/L (10-13 @ 08:43)  Sodium, Serum: 140 mmol/L (10-12 @ 08:50)      0.98 mg/dL 10-15 @ 09:09  0.87 mg/dL 10-14 @ 08:35  0.93 mg/dL 10-13 @ 08:43  0.94 mg/dL 10-12 @ 08:50      Hemoglobin:  Hemoglobin: 9.4 g/dL (10-15 @ 09:09)  Hemoglobin: 10.1 g/dL (10-14 @ 08:35)  Hemoglobin: 9.2 g/dL (10-13 @ 08:43)  Hemoglobin: 9.2 g/dL (10-12 @ 08:50)      Platelets: Platelet Count - Automated: 291 K/uL (10-15 @ 09:09)  Platelet Count - Automated: 344 K/uL (10-14 @ 08:35)  Platelet Count - Automated: 372 K/uL (10-13 @ 08:43)  Platelet Count - Automated: 382 K/uL (10-12 @ 08:50)              RADIOLOGY & ADDITIONAL STUDIES:

## 2020-10-15 NOTE — PROGRESS NOTE ADULT - ATTENDING COMMENTS
I personally conducted a physical examination of the patient. I personally gathered the patient's history. I edited the above listed findings which were prepared by the listed resident physician. I personally discussed the plan of care with the patient. The questions and concerns were addressed to the best of my ability. The patient is in agreement with the listed treatment plan.    Patient seen and examined at bedside. States she feels "the same". States her breathing is fine at rest, but she gets very tired with exertion. States she believes she needs longer course of antibiotics. Educated patient on proper duration of antibiotics and harmful effects of prolonging therapy when not medically necessary including increasing antibiotic resistance.  Discussed with Nephrology, patient with hypercalcemia, and now found to have SPEP with migrating gamma protein seen- concern for MGUS vs. multiple myeloma- Heme/Onc consulted. Nephrology spoke with patient regarding new findings.  Sputum culture also showing mold-like fungus- Pulm sending studies to rule out aspergillosis.

## 2020-10-15 NOTE — PROGRESS NOTE ADULT - ASSESSMENT
60F w/ end stage COPD on 3LNC (pending transplant list at [facility]), former smoker, CAD s/p stent (2016), Afib on Eliquis,  uncontrolled DM2 (on insulin), raynaud phenomenon and recent hospitalization at [facility] for confusion and [facility] p/w sob, admitted for COPD exacerbation and multifocal PNA    CAD s/p stent   - Continue asa, statin  - Denies ischemic symptoms   - EKG showed SR @ 109, RBBB that is chronic    Paroxysmal Afib  - Remains NSR on tele with controlled rate. Can discontinue telemetry.   - Continue Eliquis 5mg  - Continue Cardizem CD at same dose  - Monitor electrolytes, replete to keep K>4 and Mag>2  - TTE w/ mild concentric LVH grossly nL LVSF ef 55%, mild MR    End stage COPD/Pneumonia  - Pulm following.  Per note, patient is on waiting list for transplant  - CT chest noted  - Continue steroids and antibiotics  - Continue NC and BIPAP/CPAP prn/nocturnally  - Continue incentive spirometer; please reinforce     VTE ppx  - On Eliquis    - Monitor and replete lytes, keep K>4, Mg>2.  - All other medical needs as per primary team.  - Other cardiovascular workup will depend on clinical course.  - Will continue to follow.    Lisa Soto, MS FNP, [facility]  Nurse Practitioner- Cardiology   Spectra #[phone]

## 2020-10-15 NOTE — PROGRESS NOTE ADULT - SUBJECTIVE AND OBJECTIVE BOX
Patient is a 60y old  Female who presents with a chief complaint of COPD exacerbation, PNA (15 Oct 2020 10:11)      INTERVAL HPI/OVERNIGHT EVENTS:    MEDICATIONS  (STANDING):  apixaban 5 milliGRAM(s) Oral every 12 hours  aspirin  chewable 81 milliGRAM(s) Oral daily  atorvastatin 80 milliGRAM(s) Oral at bedtime  azithromycin   Tablet 500 milliGRAM(s) Oral daily  Biotene Dry Mouth Oral Rinse 5 milliLiter(s) Swish and Spit two times a day  budesonide 160 MICROgram(s)/formoterol 4.5 MICROgram(s) Inhaler 2 Puff(s) Inhalation two times a day  cefTRIAXone   IVPB 1000 milliGRAM(s) IV Intermittent every 24 hours  cholecalciferol 1000 Unit(s) Oral daily  dextrose 5%. 1000 milliLiter(s) (50 mL/Hr) IV Continuous <Continuous>  dextrose 50% Injectable 12.5 Gram(s) IV Push once  dextrose 50% Injectable 25 Gram(s) IV Push once  dextrose 50% Injectable 25 Gram(s) IV Push once  diltiazem    milliGRAM(s) Oral daily  fentaNYL   Patch  12 MICROgram(s)/Hr 1 Patch Transdermal every 72 hours  ferrous    sulfate 325 milliGRAM(s) Oral daily  guaiFENesin  milliGRAM(s) Oral every 12 hours  insulin glargine Injectable (LANTUS) 12 Unit(s) SubCutaneous at bedtime  insulin lispro (HumaLOG) corrective regimen sliding scale   SubCutaneous three times a day before meals  insulin lispro (HumaLOG) corrective regimen sliding scale   SubCutaneous at bedtime  insulin lispro Injectable (HumaLOG) 4 Unit(s) SubCutaneous three times a day before meals  ipratropium    for Nebulization 500 MICROGram(s) Nebulizer every 6 hours  montelukast 10 milliGRAM(s) Oral at bedtime  multivitamin 1 Tablet(s) Oral daily  pantoprazole    Tablet 40 milliGRAM(s) Oral before breakfast  polyethylene glycol 3350 17 Gram(s) Oral daily  predniSONE   Tablet 30 milliGRAM(s) Oral daily  senna 2 Tablet(s) Oral at bedtime  tiotropium 18 MICROgram(s) Capsule 1 Capsule(s) Inhalation daily    MEDICATIONS  (PRN):  acetaminophen   Tablet .. 650 milliGRAM(s) Oral every 6 hours PRN Mild Pain (1 - 3)  bisacodyl Suppository 10 milliGRAM(s) Rectal daily PRN Constipation  dextrose 40% Gel 15 Gram(s) Oral once PRN Blood Glucose LESS THAN 70 milliGRAM(s)/deciliter  glucagon  Injectable 1 milliGRAM(s) IntraMuscular once PRN Glucose LESS THAN 70 milligrams/deciliter  lactulose Syrup 15 Gram(s) Oral two times a day PRN constipation      Allergies    No Known Allergies    Intolerances    albuterol (Unknown)      REVIEW OF SYSTEMS:  CONSTITUTIONAL: No fever or chills  HEENT:  No headache, no sore throat  RESPIRATORY: No cough, wheezing, or shortness of breath  CARDIOVASCULAR: No chest pain, palpitations  GASTROINTESTINAL: No abd pain, nausea, vomiting, or diarrhea  GENITOURINARY: No dysuria, frequency, or hematuria  NEUROLOGICAL: no focal weakness or dizziness  MUSCULOSKELETAL: no myalgias     Vital Signs Last 24 Hrs  T(C): 36.8 (15 Oct 2020 05:40), Max: 36.9 (14 Oct 2020 20:37)  T(F): 98.2 (15 Oct 2020 05:40), Max: 98.4 (14 Oct 2020 20:37)  HR: 82 (15 Oct 2020 08:17) (74 - 83)  BP: 138/79 (15 Oct 2020 05:40) (129/68 - 138/79)  BP(mean): --  RR: 20 (15 Oct 2020 05:40) (18 - 20)  SpO2: 90% (15 Oct 2020 08:17) (90% - 99%)    PHYSICAL EXAM:  GENERAL: NAD  HEENT:  anicteric, moist mucous membranes  CHEST/LUNG:  CTA b/l, no rales, wheezes, or rhonchi  HEART:  RRR, S1, S2  ABDOMEN:  BS+, soft, nontender, nondistended  EXTREMITIES: no edema, cyanosis, or calf tenderness  NERVOUS SYSTEM: answers questions and follows commands appropriately    LABS:                        9.4    15.05 )-----------( 291      ( 15 Oct 2020 09:09 )             31.7     CBC Full  -  ( 15 Oct 2020 09:09 )  WBC Count : 15.05 K/uL  Hemoglobin : 9.4 g/dL  Hematocrit : 31.7 %  Platelet Count - Automated : 291 K/uL  Mean Cell Volume : 82.3 fl  Mean Cell Hemoglobin : 24.4 pg  Mean Cell Hemoglobin Concentration : 29.7 gm/dL  Auto Neutrophil # : 12.64 K/uL  Auto Lymphocyte # : 0.90 K/uL  Auto Monocyte # : 1.05 K/uL  Auto Eosinophil # : 0.15 K/uL  Auto Basophil # : 0.00 K/uL  Auto Neutrophil % : 82.0 %  Auto Lymphocyte % : 6.0 %  Auto Monocyte % : 7.0 %  Auto Eosinophil % : 1.0 %  Auto Basophil % : 0.0 %    15 Oct 2020 09:09    140    |  101    |  28     ----------------------------<  140    4.8     |  34     |  0.98     Ca    8.9        15 Oct 2020 09:09  Phos  3.3       15 Oct 2020 09:09  Mg     2.1       15 Oct 2020 09:09          CAPILLARY BLOOD GLUCOSE      POCT Blood Glucose.: 125 mg/dL (15 Oct 2020 08:09)  POCT Blood Glucose.: 301 mg/dL (14 Oct 2020 22:25)  POCT Blood Glucose.: 273 mg/dL (14 Oct 2020 17:35)  POCT Blood Glucose.: 245 mg/dL (14 Oct 2020 11:50)        Culture - Sputum (collected 10-12-20 @ 16:44)  Source: .Sputum Sputum  Gram Stain (10-12-20 @ 19:22):    No polymorphonuclear leukocytes per low power field    Rare Squamous epithelial cells per low power field    Few Gram Negative Rods per oil power field    Rare Gram positive cocci in pairs per oil power field  Preliminary Report (10-14-20 @ 18:35):    Rare Mold like fungus    Normal Respiratory Samaria present    Culture - Sputum (collected 10-09-20 @ 06:28)  Source: .Sputum Sputum  Gram Stain (10-09-20 @ 13:47):    No polymorphonuclear leukocytes per low power field    Few Squamous epithelial cells per low power field    Few Gram Positive Cocci in Clusters per oil power field  Final Report (10-11-20 @ 08:47):    Normal Respiratory Samaria present        RADIOLOGY & ADDITIONAL TESTS:    Personally reviewed.     Consultant(s) Notes Reviewed:  [x] YES  [ ] NO     Patient is a 60y old  Female who presents with a chief complaint of COPD exacerbation, PNA (15 Oct 2020 10:11)      INTERVAL HPI/OVERNIGHT EVENTS: Patient seen and examined at bedside.     MEDICATIONS  (STANDING):  apixaban 5 milliGRAM(s) Oral every 12 hours  aspirin  chewable 81 milliGRAM(s) Oral daily  atorvastatin 80 milliGRAM(s) Oral at bedtime  azithromycin   Tablet 500 milliGRAM(s) Oral daily  Biotene Dry Mouth Oral Rinse 5 milliLiter(s) Swish and Spit two times a day  budesonide 160 MICROgram(s)/formoterol 4.5 MICROgram(s) Inhaler 2 Puff(s) Inhalation two times a day  cefTRIAXone   IVPB 1000 milliGRAM(s) IV Intermittent every 24 hours  cholecalciferol 1000 Unit(s) Oral daily  dextrose 5%. 1000 milliLiter(s) (50 mL/Hr) IV Continuous <Continuous>  dextrose 50% Injectable 12.5 Gram(s) IV Push once  dextrose 50% Injectable 25 Gram(s) IV Push once  dextrose 50% Injectable 25 Gram(s) IV Push once  diltiazem    milliGRAM(s) Oral daily  fentaNYL   Patch  12 MICROgram(s)/Hr 1 Patch Transdermal every 72 hours  ferrous    sulfate 325 milliGRAM(s) Oral daily  guaiFENesin  milliGRAM(s) Oral every 12 hours  insulin glargine Injectable (LANTUS) 12 Unit(s) SubCutaneous at bedtime  insulin lispro (HumaLOG) corrective regimen sliding scale   SubCutaneous three times a day before meals  insulin lispro (HumaLOG) corrective regimen sliding scale   SubCutaneous at bedtime  insulin lispro Injectable (HumaLOG) 4 Unit(s) SubCutaneous three times a day before meals  ipratropium    for Nebulization 500 MICROGram(s) Nebulizer every 6 hours  montelukast 10 milliGRAM(s) Oral at bedtime  multivitamin 1 Tablet(s) Oral daily  pantoprazole    Tablet 40 milliGRAM(s) Oral before breakfast  polyethylene glycol 3350 17 Gram(s) Oral daily  predniSONE   Tablet 30 milliGRAM(s) Oral daily  senna 2 Tablet(s) Oral at bedtime  tiotropium 18 MICROgram(s) Capsule 1 Capsule(s) Inhalation daily    MEDICATIONS  (PRN):  acetaminophen   Tablet .. 650 milliGRAM(s) Oral every 6 hours PRN Mild Pain (1 - 3)  bisacodyl Suppository 10 milliGRAM(s) Rectal daily PRN Constipation  dextrose 40% Gel 15 Gram(s) Oral once PRN Blood Glucose LESS THAN 70 milliGRAM(s)/deciliter  glucagon  Injectable 1 milliGRAM(s) IntraMuscular once PRN Glucose LESS THAN 70 milligrams/deciliter  lactulose Syrup 15 Gram(s) Oral two times a day PRN constipation      Allergies    No Known Allergies    Intolerances    albuterol (Unknown)      REVIEW OF SYSTEMS:  CONSTITUTIONAL: No fever or chills  HEENT:  No headache, no sore throat  RESPIRATORY: No cough, wheezing, or shortness of breath  CARDIOVASCULAR: No chest pain, palpitations  GASTROINTESTINAL: No abd pain, nausea, vomiting, or diarrhea  GENITOURINARY: No dysuria, frequency, or hematuria  NEUROLOGICAL: no focal weakness or dizziness  MUSCULOSKELETAL: no myalgias     Vital Signs Last 24 Hrs  T(C): 36.8 (15 Oct 2020 05:40), Max: 36.9 (14 Oct 2020 20:37)  T(F): 98.2 (15 Oct 2020 05:40), Max: 98.4 (14 Oct 2020 20:37)  HR: 82 (15 Oct 2020 08:17) (74 - 83)  BP: 138/79 (15 Oct 2020 05:40) (129/68 - 138/79)  BP(mean): --  RR: 20 (15 Oct 2020 05:40) (18 - 20)  SpO2: 90% (15 Oct 2020 08:17) (90% - 99%)    PHYSICAL EXAM:  GENERAL: NAD  HEENT:  anicteric, moist mucous membranes  CHEST/LUNG:  CTA b/l, no rales, wheezes, or rhonchi  HEART:  RRR, S1, S2  ABDOMEN:  BS+, soft, nontender, nondistended  EXTREMITIES: no edema, cyanosis, or calf tenderness  NERVOUS SYSTEM: answers questions and follows commands appropriately    LABS:                        9.4    15.05 )-----------( 291      ( 15 Oct 2020 09:09 )             31.7     CBC Full  -  ( 15 Oct 2020 09:09 )  WBC Count : 15.05 K/uL  Hemoglobin : 9.4 g/dL  Hematocrit : 31.7 %  Platelet Count - Automated : 291 K/uL  Mean Cell Volume : 82.3 fl  Mean Cell Hemoglobin : 24.4 pg  Mean Cell Hemoglobin Concentration : 29.7 gm/dL  Auto Neutrophil # : 12.64 K/uL  Auto Lymphocyte # : 0.90 K/uL  Auto Monocyte # : 1.05 K/uL  Auto Eosinophil # : 0.15 K/uL  Auto Basophil # : 0.00 K/uL  Auto Neutrophil % : 82.0 %  Auto Lymphocyte % : 6.0 %  Auto Monocyte % : 7.0 %  Auto Eosinophil % : 1.0 %  Auto Basophil % : 0.0 %    15 Oct 2020 09:09    140    |  101    |  28     ----------------------------<  140    4.8     |  34     |  0.98     Ca    8.9        15 Oct 2020 09:09  Phos  3.3       15 Oct 2020 09:09  Mg     2.1       15 Oct 2020 09:09          CAPILLARY BLOOD GLUCOSE      POCT Blood Glucose.: 125 mg/dL (15 Oct 2020 08:09)  POCT Blood Glucose.: 301 mg/dL (14 Oct 2020 22:25)  POCT Blood Glucose.: 273 mg/dL (14 Oct 2020 17:35)  POCT Blood Glucose.: 245 mg/dL (14 Oct 2020 11:50)        Culture - Sputum (collected 10-12-20 @ 16:44)  Source: .Sputum Sputum  Gram Stain (10-12-20 @ 19:22):    No polymorphonuclear leukocytes per low power field    Rare Squamous epithelial cells per low power field    Few Gram Negative Rods per oil power field    Rare Gram positive cocci in pairs per oil power field  Preliminary Report (10-14-20 @ 18:35):    Rare Mold like fungus    Normal Respiratory Samaria present    Culture - Sputum (collected 10-09-20 @ 06:28)  Source: .Sputum Sputum  Gram Stain (10-09-20 @ 13:47):    No polymorphonuclear leukocytes per low power field    Few Squamous epithelial cells per low power field    Few Gram Positive Cocci in Clusters per oil power field  Final Report (10-11-20 @ 08:47):    Normal Respiratory Samaria present        RADIOLOGY & ADDITIONAL TESTS:    Personally reviewed.     Consultant(s) Notes Reviewed:  [x] YES  [ ] NO     Patient is a 60y old  Female who presents with a chief complaint of COPD exacerbation, PNA (15 Oct 2020 10:11)      INTERVAL HPI/OVERNIGHT EVENTS: Patient seen and examined at bedside. No events overnight. Patient notes mild shortness of breath with no improvement from yesterday. Currently on 4L O2 saturating well. Denies HA, chest pain, abdominal pain, N/V/D/C.     MEDICATIONS  (STANDING):  apixaban 5 milliGRAM(s) Oral every 12 hours  aspirin  chewable 81 milliGRAM(s) Oral daily  atorvastatin 80 milliGRAM(s) Oral at bedtime  azithromycin   Tablet 500 milliGRAM(s) Oral daily  Biotene Dry Mouth Oral Rinse 5 milliLiter(s) Swish and Spit two times a day  budesonide 160 MICROgram(s)/formoterol 4.5 MICROgram(s) Inhaler 2 Puff(s) Inhalation two times a day  cefTRIAXone   IVPB 1000 milliGRAM(s) IV Intermittent every 24 hours  cholecalciferol 1000 Unit(s) Oral daily  dextrose 5%. 1000 milliLiter(s) (50 mL/Hr) IV Continuous <Continuous>  dextrose 50% Injectable 12.5 Gram(s) IV Push once  dextrose 50% Injectable 25 Gram(s) IV Push once  dextrose 50% Injectable 25 Gram(s) IV Push once  diltiazem    milliGRAM(s) Oral daily  fentaNYL   Patch  12 MICROgram(s)/Hr 1 Patch Transdermal every 72 hours  ferrous    sulfate 325 milliGRAM(s) Oral daily  guaiFENesin  milliGRAM(s) Oral every 12 hours  insulin glargine Injectable (LANTUS) 12 Unit(s) SubCutaneous at bedtime  insulin lispro (HumaLOG) corrective regimen sliding scale   SubCutaneous three times a day before meals  insulin lispro (HumaLOG) corrective regimen sliding scale   SubCutaneous at bedtime  insulin lispro Injectable (HumaLOG) 4 Unit(s) SubCutaneous three times a day before meals  ipratropium    for Nebulization 500 MICROGram(s) Nebulizer every 6 hours  montelukast 10 milliGRAM(s) Oral at bedtime  multivitamin 1 Tablet(s) Oral daily  pantoprazole    Tablet 40 milliGRAM(s) Oral before breakfast  polyethylene glycol 3350 17 Gram(s) Oral daily  predniSONE   Tablet 30 milliGRAM(s) Oral daily  senna 2 Tablet(s) Oral at bedtime  tiotropium 18 MICROgram(s) Capsule 1 Capsule(s) Inhalation daily    MEDICATIONS  (PRN):  acetaminophen   Tablet .. 650 milliGRAM(s) Oral every 6 hours PRN Mild Pain (1 - 3)  bisacodyl Suppository 10 milliGRAM(s) Rectal daily PRN Constipation  dextrose 40% Gel 15 Gram(s) Oral once PRN Blood Glucose LESS THAN 70 milliGRAM(s)/deciliter  glucagon  Injectable 1 milliGRAM(s) IntraMuscular once PRN Glucose LESS THAN 70 milligrams/deciliter  lactulose Syrup 15 Gram(s) Oral two times a day PRN constipation      Allergies    No Known Allergies    Intolerances    albuterol (Unknown)      REVIEW OF SYSTEMS:  CONSTITUTIONAL: No fever or chills  HEENT:  No headache, no sore throat  RESPIRATORY: +cough, +shortness of breath; no wheezing   CARDIOVASCULAR: No chest pain, palpitations  GASTROINTESTINAL: No abd pain, nausea, vomiting, or diarrhea  GENITOURINARY: No dysuria, frequency, or hematuria  NEUROLOGICAL: no focal weakness or dizziness  MUSCULOSKELETAL: no myalgias     Vital Signs Last 24 Hrs  T(C): 36.8 (15 Oct 2020 05:40), Max: 36.9 (14 Oct 2020 20:37)  T(F): 98.2 (15 Oct 2020 05:40), Max: 98.4 (14 Oct 2020 20:37)  HR: 82 (15 Oct 2020 08:17) (74 - 83)  BP: 138/79 (15 Oct 2020 05:40) (129/68 - 138/79)  BP(mean): --  RR: 20 (15 Oct 2020 05:40) (18 - 20)  SpO2: 90% (15 Oct 2020 08:17) (90% - 99%)    PHYSICAL EXAM:  GENERAL: NAD, sitting upright in bed   HEENT:  anicteric, moist mucous membranes  CHEST/LUNG:  decreased breath sounds b/l, no rales, wheezes, or rhonchi  HEART:  RRR, S1, S2; no murmurs, rubs or gallops   ABDOMEN:  BS+, soft, nontender, nondistended  EXTREMITIES: no edema, cyanosis, or calf tenderness  NERVOUS SYSTEM: answers questions and follows commands appropriately      LABS:                        9.4    15.05 )-----------( 291      ( 15 Oct 2020 09:09 )             31.7     CBC Full  -  ( 15 Oct 2020 09:09 )  WBC Count : 15.05 K/uL  Hemoglobin : 9.4 g/dL  Hematocrit : 31.7 %  Platelet Count - Automated : 291 K/uL  Mean Cell Volume : 82.3 fl  Mean Cell Hemoglobin : 24.4 pg  Mean Cell Hemoglobin Concentration : 29.7 gm/dL  Auto Neutrophil # : 12.64 K/uL  Auto Lymphocyte # : 0.90 K/uL  Auto Monocyte # : 1.05 K/uL  Auto Eosinophil # : 0.15 K/uL  Auto Basophil # : 0.00 K/uL  Auto Neutrophil % : 82.0 %  Auto Lymphocyte % : 6.0 %  Auto Monocyte % : 7.0 %  Auto Eosinophil % : 1.0 %  Auto Basophil % : 0.0 %    15 Oct 2020 09:09    140    |  101    |  28     ----------------------------<  140    4.8     |  34     |  0.98     Ca    8.9        15 Oct 2020 09:09  Phos  3.3       15 Oct 2020 09:09  Mg     2.1       15 Oct 2020 09:09          CAPILLARY BLOOD GLUCOSE      POCT Blood Glucose.: 125 mg/dL (15 Oct 2020 08:09)  POCT Blood Glucose.: 301 mg/dL (14 Oct 2020 22:25)  POCT Blood Glucose.: 273 mg/dL (14 Oct 2020 17:35)  POCT Blood Glucose.: 245 mg/dL (14 Oct 2020 11:50)        Culture - Sputum (collected 10-12-20 @ 16:44)  Source: .Sputum Sputum  Gram Stain (10-12-20 @ 19:22):    No polymorphonuclear leukocytes per low power field    Rare Squamous epithelial cells per low power field    Few Gram Negative Rods per oil power field    Rare Gram positive cocci in pairs per oil power field  Preliminary Report (10-14-20 @ 18:35):    Rare Mold like fungus    Normal Respiratory Samaria present    Culture - Sputum (collected 10-09-20 @ 06:28)  Source: .Sputum Sputum  Gram Stain (10-09-20 @ 13:47):    No polymorphonuclear leukocytes per low power field    Few Squamous epithelial cells per low power field    Few Gram Positive Cocci in Clusters per oil power field  Final Report (10-11-20 @ 08:47):    Normal Respiratory Samaria present        RADIOLOGY & ADDITIONAL TESTS:    Personally reviewed.     Consultant(s) Notes Reviewed:  [x] YES  [ ] NO     Patient is a 60y old  Female who presents with a chief complaint of COPD exacerbation, PNA (15 Oct 2020 10:11)      INTERVAL HPI/OVERNIGHT EVENTS: Patient seen and examined at bedside. No events overnight. Patient notes mild shortness of breath with no improvement from yesterday; states that her nebulizer treatments are not working. Currently on 4L O2 saturating well. Denies fevers, chills, chest pain, abdominal pain, N/V/D/C.     MEDICATIONS  (STANDING):  apixaban 5 milliGRAM(s) Oral every 12 hours  aspirin  chewable 81 milliGRAM(s) Oral daily  atorvastatin 80 milliGRAM(s) Oral at bedtime  azithromycin   Tablet 500 milliGRAM(s) Oral daily  Biotene Dry Mouth Oral Rinse 5 milliLiter(s) Swish and Spit two times a day  budesonide 160 MICROgram(s)/formoterol 4.5 MICROgram(s) Inhaler 2 Puff(s) Inhalation two times a day  cefTRIAXone   IVPB 1000 milliGRAM(s) IV Intermittent every 24 hours  cholecalciferol 1000 Unit(s) Oral daily  dextrose 5%. 1000 milliLiter(s) (50 mL/Hr) IV Continuous <Continuous>  dextrose 50% Injectable 12.5 Gram(s) IV Push once  dextrose 50% Injectable 25 Gram(s) IV Push once  dextrose 50% Injectable 25 Gram(s) IV Push once  diltiazem    milliGRAM(s) Oral daily  fentaNYL   Patch  12 MICROgram(s)/Hr 1 Patch Transdermal every 72 hours  ferrous    sulfate 325 milliGRAM(s) Oral daily  guaiFENesin  milliGRAM(s) Oral every 12 hours  insulin glargine Injectable (LANTUS) 12 Unit(s) SubCutaneous at bedtime  insulin lispro (HumaLOG) corrective regimen sliding scale   SubCutaneous three times a day before meals  insulin lispro (HumaLOG) corrective regimen sliding scale   SubCutaneous at bedtime  insulin lispro Injectable (HumaLOG) 4 Unit(s) SubCutaneous three times a day before meals  ipratropium    for Nebulization 500 MICROGram(s) Nebulizer every 6 hours  montelukast 10 milliGRAM(s) Oral at bedtime  multivitamin 1 Tablet(s) Oral daily  pantoprazole    Tablet 40 milliGRAM(s) Oral before breakfast  polyethylene glycol 3350 17 Gram(s) Oral daily  predniSONE   Tablet 30 milliGRAM(s) Oral daily  senna 2 Tablet(s) Oral at bedtime  tiotropium 18 MICROgram(s) Capsule 1 Capsule(s) Inhalation daily    MEDICATIONS  (PRN):  acetaminophen   Tablet .. 650 milliGRAM(s) Oral every 6 hours PRN Mild Pain (1 - 3)  bisacodyl Suppository 10 milliGRAM(s) Rectal daily PRN Constipation  dextrose 40% Gel 15 Gram(s) Oral once PRN Blood Glucose LESS THAN 70 milliGRAM(s)/deciliter  glucagon  Injectable 1 milliGRAM(s) IntraMuscular once PRN Glucose LESS THAN 70 milligrams/deciliter  lactulose Syrup 15 Gram(s) Oral two times a day PRN constipation      Allergies    No Known Allergies    Intolerances    albuterol (Unknown)      REVIEW OF SYSTEMS:  CONSTITUTIONAL: No fever or chills  HEENT:  No headache, no sore throat  RESPIRATORY: +cough, +shortness of breath; no wheezing   CARDIOVASCULAR: No chest pain, palpitations  GASTROINTESTINAL: No abd pain, nausea, vomiting, or diarrhea  GENITOURINARY: No dysuria, frequency, or hematuria  NEUROLOGICAL: no focal weakness or dizziness  MUSCULOSKELETAL: no myalgias     Vital Signs Last 24 Hrs  T(C): 36.8 (15 Oct 2020 05:40), Max: 36.9 (14 Oct 2020 20:37)  T(F): 98.2 (15 Oct 2020 05:40), Max: 98.4 (14 Oct 2020 20:37)  HR: 82 (15 Oct 2020 08:17) (74 - 83)  BP: 138/79 (15 Oct 2020 05:40) (129/68 - 138/79)  BP(mean): --  RR: 20 (15 Oct 2020 05:40) (18 - 20)  SpO2: 90% (15 Oct 2020 08:17) (90% - 99%)    PHYSICAL EXAM:  GENERAL: NAD, sitting upright in bed   HEENT:  anicteric, moist mucous membranes  CHEST/LUNG:  decreased breath sounds b/l, no rales, wheezes, or rhonchi  HEART:  RRR, S1, S2; no murmurs, rubs or gallops   ABDOMEN:  BS+, soft, nontender, nondistended  EXTREMITIES: no edema, cyanosis, or calf tenderness  NERVOUS SYSTEM: answers questions and follows commands appropriately      LABS:                        9.4    15.05 )-----------( 291      ( 15 Oct 2020 09:09 )             31.7     CBC Full  -  ( 15 Oct 2020 09:09 )  WBC Count : 15.05 K/uL  Hemoglobin : 9.4 g/dL  Hematocrit : 31.7 %  Platelet Count - Automated : 291 K/uL  Mean Cell Volume : 82.3 fl  Mean Cell Hemoglobin : 24.4 pg  Mean Cell Hemoglobin Concentration : 29.7 gm/dL  Auto Neutrophil # : 12.64 K/uL  Auto Lymphocyte # : 0.90 K/uL  Auto Monocyte # : 1.05 K/uL  Auto Eosinophil # : 0.15 K/uL  Auto Basophil # : 0.00 K/uL  Auto Neutrophil % : 82.0 %  Auto Lymphocyte % : 6.0 %  Auto Monocyte % : 7.0 %  Auto Eosinophil % : 1.0 %  Auto Basophil % : 0.0 %    15 Oct 2020 09:09    140    |  101    |  28     ----------------------------<  140    4.8     |  34     |  0.98     Ca    8.9        15 Oct 2020 09:09  Phos  3.3       15 Oct 2020 09:09  Mg     2.1       15 Oct 2020 09:09          CAPILLARY BLOOD GLUCOSE      POCT Blood Glucose.: 125 mg/dL (15 Oct 2020 08:09)  POCT Blood Glucose.: 301 mg/dL (14 Oct 2020 22:25)  POCT Blood Glucose.: 273 mg/dL (14 Oct 2020 17:35)  POCT Blood Glucose.: 245 mg/dL (14 Oct 2020 11:50)        Culture - Sputum (collected 10-12-20 @ 16:44)  Source: .Sputum Sputum  Gram Stain (10-12-20 @ 19:22):    No polymorphonuclear leukocytes per low power field    Rare Squamous epithelial cells per low power field    Few Gram Negative Rods per oil power field    Rare Gram positive cocci in pairs per oil power field  Preliminary Report (10-14-20 @ 18:35):    Rare Mold like fungus    Normal Respiratory Samaria present    Culture - Sputum (collected 10-09-20 @ 06:28)  Source: .Sputum Sputum  Gram Stain (10-09-20 @ 13:47):    No polymorphonuclear leukocytes per low power field    Few Squamous epithelial cells per low power field    Few Gram Positive Cocci in Clusters per oil power field  Final Report (10-11-20 @ 08:47):    Normal Respiratory Samaria present        RADIOLOGY & ADDITIONAL TESTS:    Personally reviewed.     Consultant(s) Notes Reviewed:  [x] YES  [ ] NO

## 2020-10-15 NOTE — PROGRESS NOTE ADULT - PROBLEM SELECTOR PLAN 3
Resolved  - likely 2/2 to prerenal vs hypoxemic  - resume bumetanide as needed  - trend renal indices

## 2020-10-15 NOTE — PROGRESS NOTE ADULT - ASSESSMENT
[facility]  DOA 10/6/2020 DR JASON WALKER            Initial evaluation/Pulmonary Critical Care followup requested on 10/15/2020    by Dr Rojas   from Dr Leigh (covering till 10/18/2020)    Patient examined chart reviewed    HOSPITAL ADMISSION   PATIENT CAME  FROM (if information available)      [facility]  DOA 10/6/2020 DR JASON WALKER            REVIEW OF SYMPTOMS      Able to give ROS  Yes     RELIABLE No   CONSTITUTIONAL Weakness Yes  Chills No Vision changes No  ENDOCRINE No unexplained hair loss No heat or cold intolerance    ALLERGY No hives  Sore throat No   RESP Coughing blood no  Shortness of breath YES   NEURO No Headache  Confusion Pain neck No   CARDIAC No Chest pain No Palpitations   GI No Pain abdomen NO   Vomiting NO     PHYSICAL EXAM    HEENT Unremarkable PERRLA atraumatic   RESP Fair air entry EXP prolonged    Harsh breath sound Resp distres mild   CARDIAC S1 S2 No S3     NO JVD    ABDOMEN SOFT BS PRESENT NOT DISTENDED No hepatosplenomegaly PEDAL EDEMA present No calf tenderness  NO rash   GENERAL Not TOXIC looking    PT DATA/BEST PRACTICE  ALLERGY        albuterol      WT                  10/6/2020 76              BMI                   10/6/2020 28                       ADVANCED DIRECTIVE     HEAD OF BED ELEVATION Yes      DVT PROPHYLAXIS.     Apixaban 5.2 (10/5)    DIET. dash cons carb (10/9)                                                                          FUNEZ PROPHYLAXIS. protonix 40 (10/6)   INFECTION PPLX                                                    OXYGENATION.   10/15/2020 5l 96%      VITALS.   10/15/2020 afeb 77 130/70       MICROBIO.   COVID pcr 10/6/2020 pcr negv   COVID igg 10/6 igg negv     SP 10/12 sp rare mold like fungus   STREP Pn 10/8 strep pn negv   LEG 10/9 Leg negv         ANTIBIO.   AZITHRO azithro (10/14)   ROCEPHON rocphin (10/14) (Pneu) (Dr Brambila)        ABG     9/10/2020 ? Fio2 736/87/53      MEDS.     PREDNISONE prednisone 30 (10/12)  GUAIFENESIN guaifenesin 600.2 (10/10)  ATROVEN atroven.4 (10/9)   SYMBICORT symbicort 160 (10/16)   MONTELUKAST montelukast 10 (10/6)    SPIRIVA spiriva (10/6)     CARDIZEM CD cardizem cd 240 (10/9)              PATIENT SUMMARY.   60F w/ end stage COPD on 3LNC (trying to get on  transplant list at [facility]), former smoker, CAD s/p stent (2016), afib on eliquis, uncontrolled DM2 (on insulin), raynaud phenomenon and recent hospitalization at [facility] for confusion and AURORA p/w sob, admitted for acute on chronic resp failure with hypoxia and hyercarbia sec to multifocal PNA and COPD exacerbation with end stage COPD.   Dr Rojas asked me to cover pulm 10/15-10/18/2020     PROBLEM/ASSESSMENT/RECOMMENDATIONS.  GAS EXCHANGE Pt has ho hypercapnic resp failure Avoid excess oxygen Keep po 90-95% range  PNEUMONIA Is on azithro rocephin As sputum showed fungus will check galactomannan and ige to look for abpa   COPD On bs steroids Is intoleratnt to albuterol but is on laba lama ics and po steroids Cont rx  CHF May have element of d chf No PH   VTE Unlikely as sshe is on doac  A fib On apixaban cardizem Rate controlled anticoagulated cont rx  ANEMIA Likely acd Monitor for drop in Hb Target Hb 7 (+)    TIME SPENT   Over 25 minutes aggregate care time spent on encounter; activities included   direct patient care, counseling and/or coordinating care reviewing notes, lab data/ imaging , discussion with multidisciplinary team/ patient  /family and explaining in detail risks, benefits, alternatives  of the recommendations     MINNIE ALONZO [phone] DOA 10/6/2020 DR JASON WALKER

## 2020-10-15 NOTE — PROGRESS NOTE ADULT - ASSESSMENT
AURORA on CKD 2  Hyponatremia  Hypokalemia  COPD  Hypercalcemia     -BLCr 1  -AURORA unclear etiology, clinically ATN  -UA - 3-5 WBC, trace ketones, 30 proteins  -FeUrea 34.6%  -UPC 0.44  -Ur osm 454  -Corrected calcium 10.2, with paraprotein gap and anemia; FLC ratio is normal; SPEP is pending  -Iron repletion   -Metformin on hold  -Renal indices are stable at baseline; Can resume bumex as needed, but consider reduced dosing from prior  -Less likely AIN given paucity of WBC on UA and no peripheral eosinophils  -No IVF needed at present  -Strict I&Os  -Pulm follow up noted   AURORA on CKD 2  Hyponatremia  Hypokalemia  COPD  Hypercalcemia     -BLCr 1  -AURORA unclear etiology, clinically ATN  -UA - 3-5 WBC, trace ketones, 30 proteins  -FeUrea 34.6%  -UPC 0.44  -Ur osm 454  -Corrected calcium 10.2, with paraprotein gap and anemia; FLC ratio is normal; SPEP with gamma migrating protein seen  -Iron repletion   -Metformin on hold  -Renal indices are stable at baseline; Can resume bumex as needed, but consider reduced dosing from prior  -Less likely AIN given paucity of WBC on UA and no peripheral eosinophils  -No IVF needed at present  -Strict I&Os  -Pulm follow up noted

## 2020-10-15 NOTE — PROGRESS NOTE ADULT - PROBLEM SELECTOR PLAN 4
on metformin and glargine 12 U qhs, hold metformin while in hospital  - glargine 10units qhs  - continue humalog 3 units TID premeal   - moderate dose ISS  - accuchecks, hypoglycemia protocol  - Hba1c: 6.0

## 2020-10-15 NOTE — PROGRESS NOTE ADULT - SUBJECTIVE AND OBJECTIVE BOX
[facility]  INFECTIOUS DISEASES   =======================================================    [facility]-[id number]  CHERI HARTMAN     Follow up: COPD exacerbation, Pneumonia    No new changes or overnight event.   No fever. Feels the same.     PAST MEDICAL & SURGICAL HISTORY:  H/O Raynaud&#x27;s syndrome  Ascorbic acid deficiency  Iron deficiency anemia  Constipation  GERD without esophagitis  Type 2 diabetes mellitus  HTN (hypertension)  Heart failure  HLD (hyperlipidemia)  History of chronic atrial fibrillation  on Eliquis  End stage COPD  on 3LNC  History of tonsillectomy    Social Hx: former heavy smoker, no ETOH or drugs     FAMILY HISTORY:  FH: myocardial infarction    Family history of CABG    Allergies  No Known Allergies    Antibiotics:  Azithromycin   Ceftriaxone     REVIEW OF SYSTEMS:  CONSTITUTIONAL:  No Fever or chills  HEENT:  No diplopia or blurred vision.  No sore throat or runny nose.  CARDIOVASCULAR:  No chest pain or SOB.  RESPIRATORY:  +cough, severe SOB  GASTROINTESTINAL:  No nausea, vomiting or diarrhea.  GENITOURINARY:  No dysuria, frequency or urgency. No Blood in urine  MUSCULOSKELETAL:  no joint aches, no muscle pain  SKIN:  No change in skin, hair or nails.  NEUROLOGIC:  No paresthesias, fasciculations, seizures or weakness.  PSYCHIATRIC:  No disorder of thought or mood.  ENDOCRINE:  No heat or cold intolerance, polyuria or polydipsia.  HEMATOLOGICAL:  No easy bruising or bleeding.     Physical Exam:  Vital Signs Last 24 Hrs  T(C): 36.8 (15 Oct 2020 13:23), Max: 36.9 (14 Oct 2020 20:37)  T(F): 98.3 (15 Oct 2020 13:23), Max: 98.4 (14 Oct 2020 20:37)  HR: 76 (15 Oct 2020 14:13) (75 - 83)  BP: 135/75 (15 Oct 2020 13:23) (132/72 - 138/79)  BP(mean): --  RR: 19 (15 Oct 2020 13:23) (18 - 20)  SpO2: 96% (15 Oct 2020 14:13) (90% - 99%)  GEN: NAD  HEENT: normocephalic and atraumatic. EOMI. PERRL.    NECK: Supple.  No lymphadenopathy   LUNGS: very poor air movement but Clear to auscultation with no wheezing or rales .  HEART: Regular rate and rhythm   ABDOMEN: Soft, nontender, and nondistended.  Positive bowel sounds.    : No CVA tenderness  EXTREMITIES: Without any cyanosis, clubbing, rash, lesions or edema.  NEUROLOGIC: grossly intact.  PSYCHIATRIC: Appropriate affect .  SKIN: No ulceration or induration present.    10-15    140  |  101  |  28<H>  ----------------------------<  140<H>  4.8   |  34<H>  |  0.98    Ca    8.9      15 Oct 2020 09:09  Phos  3.3     10-15  Mg     2.1     10-15                        9.4    15.05 )-----------( 291      ( 15 Oct 2020 09:09 )             31.7     RECENT CULTURES:  10-12 @ 16:44 .Sputum Sputum     Rare Mold like fungus  Normal Respiratory Richie present    No polymorphonuclear leukocytes per low power field  Rare Squamous epithelial cells per low power field  Few Gram Negative Rods per oil power field  Rare Gram positive cocci in pairs per oil power field      10-09 @ 06:28 .Sputum Sputum     Normal Respiratory Richie present    No polymorphonuclear leukocytes per low power field  Few Squamous epithelial cells per low power field  Few Gram Positive Cocci in Clusters per oil power field    10-06 @ 09:23 .Blood Blood     No Growth Final    C-Reactive Protein, Serum: 26.32 mg/dL (10-06-20 @ 09:01)    Ferritin, Serum: 96 ng/mL (10-09-20 @ 12:19)    Procalcitonin, Serum: 0.05 ng/mL (10-10-20 @ 06:48)     COVID-19 PCR: NotDetec (10-06-20 @ 03:23)    Imaging:  < from: Xray Chest 1 View-PORTABLE IMMEDIATE (Xray Chest 1 View-PORTABLE IMMEDIATE .) (10.10.20 @ 09:54) >  EXAM:  XR CHEST PORTABLE IMMED 1V                        PROCEDURE DATE:  10/10/2020    INTERPRETATION:  INDICATION: Pneumonia; follow-up assessment.  PRIORS: 10/8/2020.  VIEWS: Portable AP radiography of the chest performed.  FINDINGS: Heart size appears within normal limits. No hilar or superior mediastinal abnormalities are identified. Infiltrates within the bilateral mid to lower lung fields are without significant change. No pleural effusion or pneumothorax is demonstrated.No mediastinal shift is noted. The visualized osseous structures appear unremarkable.  IMPRESSION: No significant interval change.    Assessment and Plan:   59y/o woman with end stage COPD on 3L O2 at home awaiting transplant at [facility], former smoker, CAD s/p stent, afib, DM2), raynaud phenomenon and recent hospitalization at [facility] for confusion and AURORA has been sent from [facility] with worsening of SOB. CT with multilobar opacities. Due to recent hospitalization and rehab stay, will cover for possible HCAP. Very high risk with a poor lung capacities.     COPD, Pneumonia  - Blood culture NGTD  - First Sputum culture with normal respiratory richie and 2nd one with rare mold like fungus  - Fungitell and galactomannan are ordered.   - Legionella U ag negative   - CXR and CT with multilobar opacities  - Respiratory and pulmonary consults noted  - TTE, result noted, EF=55%, no pulmonary edema suspected   - Can stop ceftriaxone   - Azithromycin given by pulmonary for antiinflammatory effect and prophylaxis.   - Will hold on antifungal treatment for now     Will follow.     Wagner Juarez MD  Division of Infectious Diseases   Cell [phone] between 7am and 5pm   After 5pm and weekends please call ID service at [phone].   .

## 2020-10-15 NOTE — PROGRESS NOTE ADULT - ASSESSMENT
60F w/ end stage COPD on 3LNC (trying to get on  transplant list at [facility]), former smoker, CAD s/p stent (2016), afib on eliquis, DM2 (on insulin), raynaud phenomenon and recent hospitalization at [facility] for confusion and AURORA p/w sob, admitted for acute on chronic resp failure with hypoxia and hyercarbia sec to multifocal PNA and COPD exacerabtion with end stage COPD.

## 2020-10-16 LAB
ALBUMIN SERPL ELPH-MCNC: 2.4 G/DL — LOW (ref 3.3–5)
ALP SERPL-CCNC: 61 U/L — SIGNIFICANT CHANGE UP (ref 40–120)
ALT FLD-CCNC: 16 U/L — SIGNIFICANT CHANGE UP (ref 12–78)
ANION GAP SERPL CALC-SCNC: 3 MMOL/L — LOW (ref 5–17)
AST SERPL-CCNC: 11 U/L — LOW (ref 15–37)
BASE EXCESS BLDA CALC-SCNC: 7.3 MMOL/L — HIGH (ref -2–2)
BASE EXCESS BLDV CALC-SCNC: 7.3 MMOL/L — HIGH (ref -2–2)
BASOPHILS # BLD AUTO: 0.01 K/UL — SIGNIFICANT CHANGE UP (ref 0–0.2)
BASOPHILS NFR BLD AUTO: 0.1 % — SIGNIFICANT CHANGE UP (ref 0–2)
BILIRUB SERPL-MCNC: 0.2 MG/DL — SIGNIFICANT CHANGE UP (ref 0.2–1.2)
BLOOD GAS COMMENTS ARTERIAL: SIGNIFICANT CHANGE UP
BLOOD GAS COMMENTS, VENOUS: SIGNIFICANT CHANGE UP
BUN SERPL-MCNC: 30 MG/DL — HIGH (ref 7–23)
CALCIUM SERPL-MCNC: 9 MG/DL — SIGNIFICANT CHANGE UP (ref 8.5–10.1)
CHLORIDE SERPL-SCNC: 100 MMOL/L — SIGNIFICANT CHANGE UP (ref 96–108)
CO2 SERPL-SCNC: 38 MMOL/L — HIGH (ref 22–31)
CREAT SERPL-MCNC: 1 MG/DL — SIGNIFICANT CHANGE UP (ref 0.5–1.3)
EOSINOPHIL # BLD AUTO: 0.33 K/UL — SIGNIFICANT CHANGE UP (ref 0–0.5)
EOSINOPHIL # BLD: 380 /UL — HIGH (ref 50–350)
EOSINOPHIL NFR BLD AUTO: 3.6 % — SIGNIFICANT CHANGE UP (ref 0–6)
GLUCOSE SERPL-MCNC: 179 MG/DL — HIGH (ref 70–99)
HCO3 BLDA-SCNC: 30 MMOL/L — HIGH (ref 23–27)
HCO3 BLDV-SCNC: 29 MMOL/L — SIGNIFICANT CHANGE UP (ref 21–29)
HCT VFR BLD CALC: 29.9 % — LOW (ref 34.5–45)
HGB BLD-MCNC: 8.6 G/DL — LOW (ref 11.5–15.5)
HOROWITZ INDEX BLDA+IHG-RTO: 24 — SIGNIFICANT CHANGE UP
HOROWITZ INDEX BLDV+IHG-RTO: 21 — SIGNIFICANT CHANGE UP
IMM GRANULOCYTES NFR BLD AUTO: 1.6 % — HIGH (ref 0–1.5)
LYMPHOCYTES # BLD AUTO: 1.28 K/UL — SIGNIFICANT CHANGE UP (ref 1–3.3)
LYMPHOCYTES # BLD AUTO: 13.8 % — SIGNIFICANT CHANGE UP (ref 13–44)
MCHC RBC-ENTMCNC: 23.9 PG — LOW (ref 27–34)
MCHC RBC-ENTMCNC: 28.8 GM/DL — LOW (ref 32–36)
MCV RBC AUTO: 83.1 FL — SIGNIFICANT CHANGE UP (ref 80–100)
MONOCYTES # BLD AUTO: 0.95 K/UL — HIGH (ref 0–0.9)
MONOCYTES NFR BLD AUTO: 10.2 % — SIGNIFICANT CHANGE UP (ref 2–14)
NEUTROPHILS # BLD AUTO: 6.56 K/UL — SIGNIFICANT CHANGE UP (ref 1.8–7.4)
NEUTROPHILS NFR BLD AUTO: 70.7 % — SIGNIFICANT CHANGE UP (ref 43–77)
NRBC # BLD: 0 /100 WBCS — SIGNIFICANT CHANGE UP (ref 0–0)
NT-PROBNP SERPL-SCNC: 1363 PG/ML — HIGH (ref 0–125)
PCO2 BLDA: 57 MMHG — HIGH (ref 32–46)
PCO2 BLDV: 103 MMHG — HIGH (ref 35–50)
PH BLDA: 7.38 — SIGNIFICANT CHANGE UP (ref 7.35–7.45)
PH BLDV: 7.17 — CRITICAL LOW (ref 7.35–7.45)
PLATELET # BLD AUTO: 312 K/UL — SIGNIFICANT CHANGE UP (ref 150–400)
PO2 BLDA: 70 MMHG — LOW (ref 74–108)
PO2 BLDV: 51 MMHG — HIGH (ref 25–45)
POTASSIUM SERPL-MCNC: 4.9 MMOL/L — SIGNIFICANT CHANGE UP (ref 3.5–5.3)
POTASSIUM SERPL-SCNC: 4.9 MMOL/L — SIGNIFICANT CHANGE UP (ref 3.5–5.3)
PROCALCITONIN SERPL-MCNC: <0.05 NG/ML — HIGH (ref 0–0.04)
PROT SERPL-MCNC: 5.9 G/DL — LOW (ref 6–8.3)
RBC # BLD: 3.6 M/UL — LOW (ref 3.8–5.2)
RBC # FLD: 16.8 % — HIGH (ref 10.3–14.5)
SAO2 % BLDA: 93 % — SIGNIFICANT CHANGE UP (ref 92–96)
SAO2 % BLDV: 73 % — SIGNIFICANT CHANGE UP (ref 67–88)
SODIUM SERPL-SCNC: 141 MMOL/L — SIGNIFICANT CHANGE UP (ref 135–145)
WBC # BLD: 9.28 K/UL — SIGNIFICANT CHANGE UP (ref 3.8–10.5)
WBC # FLD AUTO: 9.28 K/UL — SIGNIFICANT CHANGE UP (ref 3.8–10.5)

## 2020-10-16 PROCEDURE — 99233 SBSQ HOSP IP/OBS HIGH 50: CPT

## 2020-10-16 PROCEDURE — 99232 SBSQ HOSP IP/OBS MODERATE 35: CPT

## 2020-10-16 PROCEDURE — 99233 SBSQ HOSP IP/OBS HIGH 50: CPT | Mod: GC

## 2020-10-16 PROCEDURE — 99498 ADVNCD CARE PLAN ADDL 30 MIN: CPT

## 2020-10-16 PROCEDURE — 99497 ADVNCD CARE PLAN 30 MIN: CPT | Mod: 25

## 2020-10-16 RX ORDER — BUMETANIDE 0.25 MG/ML
1 INJECTION INTRAMUSCULAR; INTRAVENOUS ONCE
Refills: 0 | Status: COMPLETED | OUTPATIENT
Start: 2020-10-16 | End: 2020-10-16

## 2020-10-16 RX ORDER — ALPRAZOLAM 0.25 MG
0.25 TABLET ORAL EVERY 8 HOURS
Refills: 0 | Status: DISCONTINUED | OUTPATIENT
Start: 2020-10-16 | End: 2020-10-18

## 2020-10-16 RX ORDER — IRON SUCROSE 20 MG/ML
100 INJECTION, SOLUTION INTRAVENOUS EVERY 24 HOURS
Refills: 0 | Status: COMPLETED | OUTPATIENT
Start: 2020-10-17 | End: 2020-10-19

## 2020-10-16 RX ADMIN — Medication 1 TABLET(S): at 12:40

## 2020-10-16 RX ADMIN — Medication 325 MILLIGRAM(S): at 12:40

## 2020-10-16 RX ADMIN — Medication 500 MICROGRAM(S): at 20:09

## 2020-10-16 RX ADMIN — BUDESONIDE AND FORMOTEROL FUMARATE DIHYDRATE 2 PUFF(S): 160; 4.5 AEROSOL RESPIRATORY (INHALATION) at 20:06

## 2020-10-16 RX ADMIN — MONTELUKAST 10 MILLIGRAM(S): 4 TABLET, CHEWABLE ORAL at 21:40

## 2020-10-16 RX ADMIN — Medication 600 MILLIGRAM(S): at 05:53

## 2020-10-16 RX ADMIN — APIXABAN 5 MILLIGRAM(S): 2.5 TABLET, FILM COATED ORAL at 05:53

## 2020-10-16 RX ADMIN — Medication 2: at 08:32

## 2020-10-16 RX ADMIN — Medication 4 UNIT(S): at 18:01

## 2020-10-16 RX ADMIN — Medication 30 MILLIGRAM(S): at 05:53

## 2020-10-16 RX ADMIN — PANTOPRAZOLE SODIUM 40 MILLIGRAM(S): 20 TABLET, DELAYED RELEASE ORAL at 05:53

## 2020-10-16 RX ADMIN — APIXABAN 5 MILLIGRAM(S): 2.5 TABLET, FILM COATED ORAL at 18:01

## 2020-10-16 RX ADMIN — AZITHROMYCIN 500 MILLIGRAM(S): 500 TABLET, FILM COATED ORAL at 12:40

## 2020-10-16 RX ADMIN — ATORVASTATIN CALCIUM 80 MILLIGRAM(S): 80 TABLET, FILM COATED ORAL at 21:40

## 2020-10-16 RX ADMIN — Medication 4: at 12:37

## 2020-10-16 RX ADMIN — Medication 1000 UNIT(S): at 12:39

## 2020-10-16 RX ADMIN — BUDESONIDE AND FORMOTEROL FUMARATE DIHYDRATE 2 PUFF(S): 160; 4.5 AEROSOL RESPIRATORY (INHALATION) at 05:54

## 2020-10-16 RX ADMIN — SENNA PLUS 2 TABLET(S): 8.6 TABLET ORAL at 21:40

## 2020-10-16 RX ADMIN — Medication 600 MILLIGRAM(S): at 18:01

## 2020-10-16 RX ADMIN — Medication 0.25 MILLIGRAM(S): at 11:38

## 2020-10-16 RX ADMIN — INSULIN GLARGINE 12 UNIT(S): 100 INJECTION, SOLUTION SUBCUTANEOUS at 21:41

## 2020-10-16 RX ADMIN — TIOTROPIUM BROMIDE 1 CAPSULE(S): 18 CAPSULE ORAL; RESPIRATORY (INHALATION) at 21:43

## 2020-10-16 RX ADMIN — Medication 5 MILLILITER(S): at 05:55

## 2020-10-16 RX ADMIN — Medication 4 UNIT(S): at 12:37

## 2020-10-16 RX ADMIN — Medication 81 MILLIGRAM(S): at 12:40

## 2020-10-16 RX ADMIN — Medication 500 MICROGRAM(S): at 08:17

## 2020-10-16 RX ADMIN — Medication 500 MICROGRAM(S): at 13:28

## 2020-10-16 RX ADMIN — Medication 4 UNIT(S): at 08:32

## 2020-10-16 RX ADMIN — Medication 240 MILLIGRAM(S): at 05:53

## 2020-10-16 NOTE — PROGRESS NOTE ADULT - PROBLEM SELECTOR PLAN 3
Resolved  - likely 2/2 to prerenal vs hypoxemic  - resume bumetanide as needed  - trend renal indices Resolved  - likely 2/2 to prerenal vs hypoxemic  - resume bumetanide as needed  - trend renal indices  SPEP with gamma migrating protein seen -- HemeOnc consulted to r/o Multiple myeloma, MGUS; f/u recs

## 2020-10-16 NOTE — CONSULT NOTE ADULT - ASSESSMENT
[ASSESSMENT and  PLAN]  60F w/ end stage COPD on 3LNC (trying to get on  transplant list at [facility]), former smoker, CAD s/p stent (2016), afib on eliquis, DM2 (on insulin), raynaud phenomenon and recent hospitalization at [facility] for confusion and AURORA p/w sob, admitted for acute on chronic resp failure with hypoxia and hyercarbia sec to multifocal PNA and COPD exacerabtion with end stage COPD.     Being evaluated for lung transplant.   had lost weight.     {[id number]}Acute on chronic respiratory failure with hypoxia and hypercapnia.  Plan: likely multifactorial from multifocal PNA in setting of end stage COPD     Now noted to have MGUS.   IgG-Lambda monoclonal protein.   SPEP with faint M spike of 0.1g/dL    anemia due to chronic disease  Suspect Fe def anemia.     RECOMMENDATIONS  Tx of pneumonia per pulmonary and ID.   Recommend repeat MGUS studies outpt.   As pt undergoing txplant evaluation, to have re-eval with hematologist associated with transplant center.   Unclear if faint band may disappear on own post infection or may persisit.     Limited PRBC transfusion to only necessary transfusion, to prevent alloimmunization.   Follow CBC    DVT Prophyalxis  On Apixiban    Start IV iron.   Eval for causes for Fe def.   Intermittent bleeding would be concern given pt is on AC.     Thank you for consulting us.

## 2020-10-16 NOTE — PROGRESS NOTE ADULT - ATTENDING COMMENTS
I personally conducted a physical examination of the patient. I personally gathered the patient's history. I edited the above listed findings which were prepared by the listed resident physician. I personally discussed the plan of care with the patient. The questions and concerns were addressed to the best of my ability. The patient is in agreement with the listed treatment plan.    Patient seen and examined at bedside. States she feels the same. AM VBG showing respiratory acidosis, however, repeat ABG showing pH 7.38. Appreciate Heme/Onc consult.

## 2020-10-16 NOTE — PROGRESS NOTE ADULT - ASSESSMENT
60F w/ end stage COPD on 3LNC (trying to get on  transplant list at [facility]), former smoker, CAD s/p stent (2016), afib on eliquis, DM2 (on insulin), raynaud phenomenon and recent hospitalization at [facility] for confusion and AURORA p/w sob, admitted for acute on chronic resp failure with hypoxia and hyercarbia sec to multifocal PNA and COPD exacerabtion with end stage COPD.

## 2020-10-16 NOTE — PROGRESS NOTE ADULT - SUBJECTIVE AND OBJECTIVE BOX
SUBJECTIVE AND OBJECTIVE:  INTERVAL HPI/OVERNIGHT EVENTS:    DNR on chart:   Allergies    No Known Allergies    Intolerances    albuterol (Unknown)  MEDICATIONS  (STANDING):  apixaban 5 milliGRAM(s) Oral every 12 hours  aspirin  chewable 81 milliGRAM(s) Oral daily  atorvastatin 80 milliGRAM(s) Oral at bedtime  azithromycin   Tablet 500 milliGRAM(s) Oral daily  Biotene Dry Mouth Oral Rinse 5 milliLiter(s) Swish and Spit two times a day  budesonide 160 MICROgram(s)/formoterol 4.5 MICROgram(s) Inhaler 2 Puff(s) Inhalation two times a day  cholecalciferol 1000 Unit(s) Oral daily  dextrose 5%. 1000 milliLiter(s) (50 mL/Hr) IV Continuous <Continuous>  dextrose 50% Injectable 12.5 Gram(s) IV Push once  dextrose 50% Injectable 25 Gram(s) IV Push once  dextrose 50% Injectable 25 Gram(s) IV Push once  diltiazem    milliGRAM(s) Oral daily  fentaNYL   Patch  12 MICROgram(s)/Hr 1 Patch Transdermal every 72 hours  ferrous    sulfate 325 milliGRAM(s) Oral daily  guaiFENesin  milliGRAM(s) Oral every 12 hours  insulin glargine Injectable (LANTUS) 12 Unit(s) SubCutaneous at bedtime  insulin lispro (HumaLOG) corrective regimen sliding scale   SubCutaneous three times a day before meals  insulin lispro (HumaLOG) corrective regimen sliding scale   SubCutaneous at bedtime  insulin lispro Injectable (HumaLOG) 4 Unit(s) SubCutaneous three times a day before meals  ipratropium    for Nebulization 500 MICROGram(s) Nebulizer every 6 hours  montelukast 10 milliGRAM(s) Oral at bedtime  multivitamin 1 Tablet(s) Oral daily  pantoprazole    Tablet 40 milliGRAM(s) Oral before breakfast  polyethylene glycol 3350 17 Gram(s) Oral daily  predniSONE   Tablet 30 milliGRAM(s) Oral daily  senna 2 Tablet(s) Oral at bedtime  tiotropium 18 MICROgram(s) Capsule 1 Capsule(s) Inhalation daily    MEDICATIONS  (PRN):  acetaminophen   Tablet .. 650 milliGRAM(s) Oral every 6 hours PRN Mild Pain (1 - 3)  bisacodyl Suppository 10 milliGRAM(s) Rectal daily PRN Constipation  dextrose 40% Gel 15 Gram(s) Oral once PRN Blood Glucose LESS THAN 70 milliGRAM(s)/deciliter  glucagon  Injectable 1 milliGRAM(s) IntraMuscular once PRN Glucose LESS THAN 70 milligrams/deciliter  lactulose Syrup 15 Gram(s) Oral two times a day PRN constipation      ITEMS UNCHECKED ARE NOT PRESENT    PRESENT SYMPTOMS: [ ]Unable to obtain due to poor mentation   Source if other than patient:  [ ]Family   [ ]Team     Pain:  [ ]yes [ ]no  QOL impact -   Location -                    Aggravating factors -  Quality -  Radiation -  Timing-  Severity (0-10 scale):  Minimal acceptable level (0-10 scale):     Dyspnea:                           [ ]Mild [ ]Moderate [ ]Severe  Anxiety:                             [ ]Mild [ ]Moderate [ ]Severe  Fatigue:                             [ ]Mild [ ]Moderate [ ]Severe  Nausea:                             [ ]Mild [ ]Moderate [ ]Severe  Loss of appetite:              [ ]Mild [ ]Moderate [ ]Severe  Constipation:                    [ ]Mild [ ]Moderate [ ]Severe    CPOT:    https://www.[facility].org/getattachment/[email or web address]/Critical-Care-Pain-Observation-Tool-(CPOT)    PAIN AD Score:	  http://geriatrictoolkit.[facility]/cog/painad.pdf (Ctrl + left click to view)    Other Symptoms:  [ ]All other review of systems negative     Palliative Performance Status Version 2:         %      http://npcrc.org/files/news/palliative_performance_scale_ppsv2.pdf  PHYSICAL EXAM:  Vital Signs Last 24 Hrs  T(C): 36.8 (16 Oct 2020 05:50), Max: 36.9 (15 Oct 2020 21:20)  T(F): 98.2 (16 Oct 2020 05:50), Max: 98.4 (15 Oct 2020 21:20)  HR: 77 (16 Oct 2020 08:17) (73 - 79)  BP: 134/76 (16 Oct 2020 05:50) (122/62 - 135/75)  BP(mean): --  RR: 20 (16 Oct 2020 05:50) (19 - 20)  SpO2: 98% (16 Oct 2020 08:17) (96% - 100%) I&O's Summary    15 Oct 2020 07:01  -  16 Oct 2020 07:00  --------------------------------------------------------  IN: 480 mL / OUT: 400 mL / NET: 80 mL       GENERAL:  [ ]Alert  [ ]Oriented x   [ ]Lethargic  [ ]Cachexia  [ ]Unarousable  [ ]Verbal  [ ]Non-Verbal  Behavioral:   [ ]Anxiety  [ ]Delirium [ ]Agitation [ ]Other  HEENT:  [ ]Normal   [ ]Dry mouth   [ ]ET Tube/Trach  [ ]Oral lesions  PULMONARY:   [ ]Clear [ ]Tachypnea  [ ]Audible excessive secretions   [ ]Rhonchi        [ ]Right [ ]Left [ ]Bilateral  [ ]Crackles        [ ]Right [ ]Left [ ]Bilateral  [ ]Wheezing     [ ]Right [ ]Left [ ]Bilateral  [ ]Diminished BS [ ] Right [ ]Left [ ]Bilateral  CARDIOVASCULAR:    [ ]Regular [ ]Irregular [ ]Tachy  [ ]Neal [ ]Murmur [ ]Other  GASTROINTESTINAL:  [ ]Soft  [ ]Distended   [ ]+BS  [ ]Non tender [ ]Tender  [ ]PEG [ ]OGT/ NGT   Last BM:      GENITOURINARY:  [ ]Normal [ ]Incontinent   [ ]Oliguria/Anuria   [ ]Rosado  MUSCULOSKELETAL:   [ ]Normal   [ ]Weakness  [ ]Bed/Wheelchair bound [ ]Edema  NEUROLOGIC:   [ ]No focal deficits  [ ] Cognitive impairment  [ ] Dysphagia [ ]Dysarthria [ ] Paresis [ ]Other   SKIN:   [ ]Normal  [ ]Rash   [ ]Pressure ulcer(s) [ ]y [ ]n present on admission    CRITICAL CARE:  [ ]Shock Present  [ ]Septic [ ]Cardiogenic [ ]Neurologic [ ]Hypovolemic  [ ]Vasopressors [ ]Inotropes  [ ]Respiratory failure present [ ]Mechanical Ventilation [ ]Non-invasive ventilatory support [ ]High-Flow   [ ]Acute  [ ]Chronic [ ]Hypoxic  [ ]Hypercarbic [ ]Other  [ ]Other organ failure     LABS:                        8.6    9.28  )-----------( 312      ( 16 Oct 2020 06:02 )             29.9   10-16    141  |  100  |  30<H>  ----------------------------<  179<H>  4.9   |  38<H>  |  1.00    Ca    9.0      16 Oct 2020 06:02  Phos  3.3     10-15  Mg     2.1     10-15    TPro  5.9<L>  /  Alb  2.4<L>  /  TBili  0.2  /  DBili  x   /  AST  11<L>  /  ALT  16  /  AlkPhos  61  10-16        RADIOLOGY & ADDITIONAL STUDIES:    Protein Calorie Malnutrition Present: [ ]mild [ ]moderate [ ]severe [ ]underweight [ ]morbid obesity  https://www.andeal.org/vault/2440/web/files/ONC/Table_Clinical%20Characteristics%20to%20Document%20Malnutrition-White%20JV%20et%20al%461178.pdf    Height (cm): 162.6 (10-06-20 @ 01:24), 162.6 (08-01-20 @ 23:15), 162.56 (12-12-19 @ 08:06)  Weight (kg): 76.2 (10-06-20 @ 01:24), 90.3 (08-01-20 @ 23:15), 84.6 (12-12-19 @ 08:06)  BMI (kg/m2): 28.8 (10-06-20 @ 01:24), 34.2 (08-01-20 @ 23:15), 32 (12-12-19 @ 08:06)    [ ]PPSV2 < or = 30%  [ ]significant weight loss [ ]poor nutritional intake [ ]anasarca   Albumin, Serum: 2.4 g/dL (10-16-20 @ 06:02)   [ ]Artificial Nutrition    REFERRALS:   [ ]Chaplaincy  [ ]Hospice  [ ]Child Life  [ ]Social Work  [ ]Case management [ ]Holistic Therapy     Goals of Care Document:  ALVINA Gonzales (10-07-20 @ 16:36)  Goals of Care Conversation:   Participants:  · Participants  Patient    Advance Directives:  · Does patient have Advance Directive  No  · Caregiver:  yes  · Name  Michele Vega  · Phone Number  [phone]    Conversation Discussion:  · Conversation  AD  · Conversation Details  met pt, after speaking to Wang Hanson CM: pt uses O2 at home, goal to be on lung transplant list, wants HC, not [facility], pt has 2 brothers, transport pt to MD offices.  pt with SOB, on O2, pt approached regarding HCP, educated on role of hcp, pt declines to complete hcp or take a copy to think about at home.  pt unsure who will be her hcp, she has 2 brothers, Michele is younger brother.  pt spoke of coming from a long line of ill family, including her mother, she knows all about a hcp and the decisions that need to be made, but declines to discuss further, PC RN contact # given to pt, will follow as needed.    Personal Advance Directives Treatment Guidelines:   Treatment Guidelines:  · Decision Maker  Patient    Location of Discussion:   Duration of Advanced Care Planning Meeting:  · Time spent (in minutes)  25    Location of Discussion:  · Location of discussion  Face to face      Electronic Signatures:  Clementina Gonzales (RN)   (Signed 07-Oct-2020 16:48)  	Authored: Goals of Care Conversation, Personal Advance Directives Treatment Guidelines, Location of Discussion    Last Updated: 07-Oct-2020 20:52 by Suad Pinto)       SUBJECTIVE AND OBJECTIVE/INTERVAL HPI/OVERNIGHT EVENTS:  - overnight + SOB with Bipap. ABG this AM showed Ph 7.16 sp bipap placed back on  - Pt appointed her brothers Sheryl & Alfonso as HCP and consented to DNR/DNI  DNR on chart:   Allergies    No Known Allergies    Intolerances    albuterol (Unknown)  MEDICATIONS  (STANDING):  apixaban 5 milliGRAM(s) Oral every 12 hours  aspirin  chewable 81 milliGRAM(s) Oral daily  atorvastatin 80 milliGRAM(s) Oral at bedtime  azithromycin   Tablet 500 milliGRAM(s) Oral daily  Biotene Dry Mouth Oral Rinse 5 milliLiter(s) Swish and Spit two times a day  budesonide 160 MICROgram(s)/formoterol 4.5 MICROgram(s) Inhaler 2 Puff(s) Inhalation two times a day  cholecalciferol 1000 Unit(s) Oral daily  dextrose 5%. 1000 milliLiter(s) (50 mL/Hr) IV Continuous <Continuous>  dextrose 50% Injectable 12.5 Gram(s) IV Push once  dextrose 50% Injectable 25 Gram(s) IV Push once  dextrose 50% Injectable 25 Gram(s) IV Push once  diltiazem    milliGRAM(s) Oral daily  fentaNYL   Patch  12 MICROgram(s)/Hr 1 Patch Transdermal every 72 hours  ferrous    sulfate 325 milliGRAM(s) Oral daily  guaiFENesin  milliGRAM(s) Oral every 12 hours  insulin glargine Injectable (LANTUS) 12 Unit(s) SubCutaneous at bedtime  insulin lispro (HumaLOG) corrective regimen sliding scale   SubCutaneous three times a day before meals  insulin lispro (HumaLOG) corrective regimen sliding scale   SubCutaneous at bedtime  insulin lispro Injectable (HumaLOG) 4 Unit(s) SubCutaneous three times a day before meals  ipratropium    for Nebulization 500 MICROGram(s) Nebulizer every 6 hours  montelukast 10 milliGRAM(s) Oral at bedtime  multivitamin 1 Tablet(s) Oral daily  pantoprazole    Tablet 40 milliGRAM(s) Oral before breakfast  polyethylene glycol 3350 17 Gram(s) Oral daily  predniSONE   Tablet 30 milliGRAM(s) Oral daily  senna 2 Tablet(s) Oral at bedtime  tiotropium 18 MICROgram(s) Capsule 1 Capsule(s) Inhalation daily    MEDICATIONS  (PRN):  acetaminophen   Tablet .. 650 milliGRAM(s) Oral every 6 hours PRN Mild Pain (1 - 3)  bisacodyl Suppository 10 milliGRAM(s) Rectal daily PRN Constipation  dextrose 40% Gel 15 Gram(s) Oral once PRN Blood Glucose LESS THAN 70 milliGRAM(s)/deciliter  glucagon  Injectable 1 milliGRAM(s) IntraMuscular once PRN Glucose LESS THAN 70 milligrams/deciliter  lactulose Syrup 15 Gram(s) Oral two times a day PRN constipation      ITEMS UNCHECKED ARE NOT PRESENT    PRESENT SYMPTOMS: [ ]Unable to obtain due to poor mentation   Source if other than patient:  [ ]Family   [ ]Team     Pain:  [ ]yes [ ]no  QOL impact -   Location -                    Aggravating factors -  Quality -  Radiation -  Timing-  Severity (0-10 scale):  Minimal acceptable level (0-10 scale):     Dyspnea:                           [ ]Mild [ ]Moderate [ ]Severe  Anxiety:                             [ ]Mild [ ]Moderate [ ]Severe  Fatigue:                             [ ]Mild [ ]Moderate [ ]Severe  Nausea:                             [ ]Mild [ ]Moderate [ ]Severe  Loss of appetite:              [ ]Mild [ ]Moderate [ ]Severe  Constipation:                    [ ]Mild [ ]Moderate [ ]Severe    CPOT:    https://www.[facility].org/getattachment/[email or web address]/Critical-Care-Pain-Observation-Tool-(CPOT)    PAIN AD Score:	  http://geriatrictoolkit.[facility]/cog/painad.pdf (Ctrl + left click to view)    Other Symptoms:  [ ]All other review of systems negative     Palliative Performance Status Version 2:         %      http://npcrc.org/files/news/palliative_performance_scale_ppsv2.pdf  PHYSICAL EXAM:  Vital Signs Last 24 Hrs  T(C): 36.8 (16 Oct 2020 05:50), Max: 36.9 (15 Oct 2020 21:20)  T(F): 98.2 (16 Oct 2020 05:50), Max: 98.4 (15 Oct 2020 21:20)  HR: 77 (16 Oct 2020 08:17) (73 - 79)  BP: 134/76 (16 Oct 2020 05:50) (122/62 - 135/75)  BP(mean): --  RR: 20 (16 Oct 2020 05:50) (19 - 20)  SpO2: 98% (16 Oct 2020 08:17) (96% - 100%) I&O's Summary    15 Oct 2020 07:01  -  16 Oct 2020 07:00  --------------------------------------------------------  IN: 480 mL / OUT: 400 mL / NET: 80 mL       GENERAL:  [ ]Alert  [ ]Oriented x   [ ]Lethargic  [ ]Cachexia  [ ]Unarousable  [ ]Verbal  [ ]Non-Verbal  Behavioral:   [ ]Anxiety  [ ]Delirium [ ]Agitation [ ]Other  HEENT:  [ ]Normal   [ ]Dry mouth   [ ]ET Tube/Trach  [ ]Oral lesions  PULMONARY:   [ ]Clear [ ]Tachypnea  [ ]Audible excessive secretions   [ ]Rhonchi        [ ]Right [ ]Left [ ]Bilateral  [ ]Crackles        [ ]Right [ ]Left [ ]Bilateral  [ ]Wheezing     [ ]Right [ ]Left [ ]Bilateral  [ ]Diminished BS [ ] Right [ ]Left [ ]Bilateral  CARDIOVASCULAR:    [ ]Regular [ ]Irregular [ ]Tachy  [ ]Neal [ ]Murmur [ ]Other  GASTROINTESTINAL:  [ ]Soft  [ ]Distended   [ ]+BS  [ ]Non tender [ ]Tender  [ ]PEG [ ]OGT/ NGT   Last BM:      GENITOURINARY:  [ ]Normal [ ]Incontinent   [ ]Oliguria/Anuria   [ ]Rosado  MUSCULOSKELETAL:   [ ]Normal   [ ]Weakness  [ ]Bed/Wheelchair bound [ ]Edema  NEUROLOGIC:   [ ]No focal deficits  [ ] Cognitive impairment  [ ] Dysphagia [ ]Dysarthria [ ] Paresis [ ]Other   SKIN:   [ ]Normal  [ ]Rash   [ ]Pressure ulcer(s) [ ]y [ ]n present on admission    CRITICAL CARE:  [ ]Shock Present  [ ]Septic [ ]Cardiogenic [ ]Neurologic [ ]Hypovolemic  [ ]Vasopressors [ ]Inotropes  [ ]Respiratory failure present [ ]Mechanical Ventilation [ ]Non-invasive ventilatory support [ ]High-Flow   [ ]Acute  [ ]Chronic [ ]Hypoxic  [ ]Hypercarbic [ ]Other  [ ]Other organ failure     LABS:                        8.6    9.28  )-----------( 312      ( 16 Oct 2020 06:02 )             29.9   10-16    141  |  100  |  30<H>  ----------------------------<  179<H>  4.9   |  38<H>  |  1.00    Ca    9.0      16 Oct 2020 06:02  Phos  3.3     10-15  Mg     2.1     10-15    TPro  5.9<L>  /  Alb  2.4<L>  /  TBili  0.2  /  DBili  x   /  AST  11<L>  /  ALT  16  /  AlkPhos  61  10-16        RADIOLOGY & ADDITIONAL STUDIES:    Protein Calorie Malnutrition Present: [ ]mild [ ]moderate [ ]severe [ ]underweight [ ]morbid obesity  https://www.andeal.org/vault/6490/web/files/ONC/Table_Clinical%20Characteristics%20to%20Document%20Malnutrition-White%20JV%20et%20al%744426.pdf    Height (cm): 162.6 (10-06-20 @ 01:24), 162.6 (08-01-20 @ 23:15), 162.56 (12-12-19 @ 08:06)  Weight (kg): 76.2 (10-06-20 @ 01:24), 90.3 (08-01-20 @ 23:15), 84.6 (12-12-19 @ 08:06)  BMI (kg/m2): 28.8 (10-06-20 @ 01:24), 34.2 (08-01-20 @ 23:15), 32 (12-12-19 @ 08:06)    [ ]PPSV2 < or = 30%  [ ]significant weight loss [ ]poor nutritional intake [ ]anasarca   Albumin, Serum: 2.4 g/dL (10-16-20 @ 06:02)   [ ]Artificial Nutrition    REFERRALS:   [ ]Chaplaincy  [ ]Hospice  [ ]Child Life  [ ]Social Work  [ ]Case management [ ]Holistic Therapy     Goals of Care Document:  ALVINA Gonzales (10-07-20 @ 16:36)  Goals of Care Conversation:   Participants:  · Participants  Patient    Advance Directives:  · Does patient have Advance Directive  No  · Caregiver:  yes  · Name  Michele Vega  · Phone Number  [phone]    Conversation Discussion:  · Conversation  AD  · Conversation Details  met pt, after speaking to Wang Hanson CM: pt uses O2 at home, goal to be on lung transplant list, wants HC, not [facility], pt has 2 brothers, transport pt to [facility] offices.  pt with SOB, on O2, pt approached regarding HCP, educated on role of hcp, pt declines to complete hcp or take a copy to think about at home.  pt unsure who will be her hcp, she has 2 brothers, Michele is younger brother.  pt spoke of coming from a long line of ill family, including her mother, she knows all about a hcp and the decisions that need to be made, but declines to discuss further, PC RN contact # given to pt, will follow as needed.    Personal Advance Directives Treatment Guidelines:   Treatment Guidelines:  · Decision Maker  Patient    Location of Discussion:   Duration of Advanced Care Planning Meeting:  · Time spent (in minutes)  25    Location of Discussion:  · Location of discussion  Face to face      Electronic Signatures:  Clementina Gonzales (RN)   (Signed 07-Oct-2020 16:48)  	Authored: Goals of Care Conversation, Personal Advance Directives Treatment Guidelines, Location of Discussion    Last Updated: 07-Oct-2020 20:52 by Suad Pinto)

## 2020-10-16 NOTE — PROGRESS NOTE ADULT - ASSESSMENT
AURORA on CKD 2  Hyponatremia  Hypokalemia  COPD  Hypercalcemia     -BLCr 1  -AURORA unclear etiology, clinically ATN  -UA - 3-5 WBC, trace ketones, 30 proteins  -FeUrea 34.6%  -UPC 0.44  -Ur osm 454  -Corrected calcium 10.2, with paraprotein gap and anemia; FLC ratio is normal; SPEP with gamma migrating protein seen  -Iron repletion   -Metformin on hold  -Renal indices are stable at baseline; Can resume bumex as needed, but consider reduced dosing from prior  -Less likely AIN given paucity of WBC on UA and no peripheral eosinophils  -No IVF needed at present  -Strict I&Os  -Pulm follow up noted    Updated patient about SPEP findings.  Heme/onc to be consulted.

## 2020-10-16 NOTE — PROGRESS NOTE ADULT - PROBLEM SELECTOR PLAN 1
likely multifactorial from multifocal PNA and COPD exacerbation  - slow clinical improvement  - currently on 4L NC with BIPAP qhs   - maintain O2 >/ 88, will slowly titrate  - Continue spiriva, symbicort, montelukast, inhaler PRN, atrovent   - Continue ceftriaxone (last day today) and azithromycin (home med)  - Pulm (Dr. Rojas)--recs appreciated  - ID, Dr. Juarez on board--recs appreciated  - Blood culture negative, Sputum cx w/ normal respiratory richie  - f/u repeat sputum cx  - plan to DC home w/ home PT likely multifactorial from multifocal PNA and COPD exacerbation  - slow clinical improvement  - currently on 5L NC with BIPAP qhs -- maintain O2 >/ 88  - VBG: pH 7.17, base excess 7.3, FIO2 21.0   - pt given 0.25 Xanax for anxiety and then counseled to use BIPAP continuously; repeat AB.38, 57, 70, 30  - Blood culture negative, Sputum cx w/ normal respiratory richie  - Sputum cx (10/12): rare mold-like fungus   - ID, Dr. Juarez on board--Fungitell and galactomannan cx; will hold antifungals for now   - Pulm (Dr. Rojas)--check galactomannan and ige to look for abpa likely multifactorial from multifocal PNA and COPD exacerbation  - slow clinical improvement  - currently on 5L NC with BIPAP qhs -- maintain O2 >/ 88  - VBG: pH 7.17, base excess 7.3, FIO2 21.0   - pt given 0.25 Xanax for anxiety and then counseled to use BIPAP continuously; repeat AB.38, 57, 70, 30  - Blood culture negative, Sputum cx w/ normal respiratory richie  - Sputum cx (10/12) : rare mold-like fungus   - ID, Dr. Juarez on board--Fungitell and galactomannan cx; will hold antifungals for now   - Pulm (Dr. Rojas)--check galactomannan and ige to look for abpa  - SPEP done w/ sputum cx -- HemeOnc consulted to r/o Multiple myeloma, MGUS; f/u recs likely multifactorial from multifocal PNA and COPD exacerbation  - slow clinical improvement  - currently on 5L NC with BIPAP qhs -- maintain O2 >/ 88  - VBG 7.17/103/51/29/73%   - pt given 0.25 Xanax for anxiety and then counseled to use BIPAP continuously; repeat ABG on NC 7.38/57/71/31/93%.  - Blood culture negative, Sputum cx w/ normal respiratory richie  - Sputum cx (10/12) : rare mold-like fungus   - ID, Dr. Juarez on board--Fungitell and galactomannan cx; will hold antifungals for now   - Pulm (Dr. Rojas)--check galactomannan and ige to look for abpa  - SPEP done w/ sputum cx -- HemeOnc consulted to r/o Multiple myeloma, MGUS; f/u recs likely multifactorial from multifocal PNA and COPD exacerbation  - slow clinical improvement  - currently on 5L NC with BIPAP qhs -- maintain O2 >/ 88  - VBG 7.17/103/51/29/73%   - pt given 0.25 Xanax for anxiety and then counseled to use BIPAP continuously; repeat ABG on NC 7.38/57/71/31/93%.  - Blood culture negative, Sputum cx w/ normal respiratory richie  - Sputum cx (10/12) : rare mold-like fungus   - eosinophil count 380   - f/u IgE, Aspergillus antigen  - ID, Dr. Juarez--Fungitell and galactomannan cx; will hold antifungals for now   - Pulm (Dr. Rojas)--check galactomannan and ige to look for abpa likely multifactorial from multifocal PNA in setting of end stage COPD   - slow clinical improvement  - currently on 5L NC with BIPAP qhs -- maintain O2 >/ 88  - VBG 7.17/103/51/29 in AM; likely lab error as repeat abg with no intervention showed 7.38/57/71/31  - Blood culture negative, initial Sputum cx w/ normal respiratory richie  - repeat Sputum cx (10/12) : rare mold-like fungus   - f/u IgE, Aspergillus antigen to r/o abpa  - heme/onc Dr. jaramillo's consulted to R/o MM in setting of sputum culture findings and positive SPEP, f/u recs  - ID, Dr. Juarez--Fungitell and galactomannan cx; will hold antifungals for now   - Pulm consulted, recs appreciated  - Pulm (Dr. Rojas)

## 2020-10-16 NOTE — PROGRESS NOTE ADULT - PROBLEM SELECTOR PLAN 2
on 3LNC at rehab, currently requiring 4L nc with BIPAP, wean as tolerated  - continue spiriva, symbicort, montelukast, inhaler PRN, atrovent   -theophylline held given tachycardia and brief afib  - BIPAP qhs and PRN  -of note pt is not on transplant list yet, is planning to go on list at [facility] if medically stable and improved on 3LNC at rehab, currently requiring 5L NC with BIPAP, wean as tolerated  - continue spiriva, symbicort, montelukast, inhaler PRN, atrovent   -theophylline held given tachycardia and brief afib  - BIPAP qhs and PRN  -of note pt is not on transplant list yet, is planning to go on list at [facility] if medically stable and improved on 3LNC at rehab, currently requiring 5L NC with BIPAP, wean as tolerated  - continue spiriva, symbicort, montelukast, inhaler PRN, atrovent   -theophylline held given tachycardia and brief afib  - BIPAP qhs and PRN -- pt counseled to use more frequently   -of note pt is not on transplant list yet, is planning to go on list at [facility] if medically stable and improved

## 2020-10-16 NOTE — PROGRESS NOTE ADULT - SUBJECTIVE AND OBJECTIVE BOX
CHERI HARTMAN    [facility] 3WES 362 W1    Patient is a 60y old  Female who presents with a chief complaint of COPD exacerbation, PNA (15 Oct 2020 16:39)       Allergies    No Known Allergies    Intolerances    albuterol (Unknown)      HPI:  60F w/ end stage COPD on 3LNC (pending transplant list at [facility]), former smoker, CAD s/p stent (2016), afib on eliquis, uncontrolled DM2 (on insulin), raynaud phenomenon and recent hospitalization at [facility] for confusion and [facility] p/w sob. Sent from [facility]. Patient states that she has had worsening shortness of breath for past two days. Unable to obtain comprehensive history due to dyspnea. Patient denies fever, chills, sore throat, cough, chest pain, palpitations, abd pain, n/v. Currently feels short of breath even after receiving duonebs and steroids in the ED. Unable to keep mask on during interview and lay back in stretcher due to subjective sob. Patient repeatedly stating that it is too hot in her room and fanning herself with a paper. Per chart, Dr. Mathew (PCP) has chart notes regarding possible hallucinations. Would like her home medications now but otherwise has no acute complaints.    In the ED, VS T97.7F  /78 RR 21 SpO2 94% on 4LNC. Labs significant for mild leukocytosis of 11.41, H/H 9.9/30.5, thrombophilia of 435. CO2 35, albumin 2.4.   CXR done showing b/l patchy infiltrates, official read pending  CT chest done showing multifocal PNA and reactive mediastinal lymphadenopathy  EKG read limited by artifact, sinus tachycardia with RBBB and premature supraventricular complexes (06 Oct 2020 04:55)      PAST MEDICAL & SURGICAL HISTORY:  Ascorbic acid deficiency    Iron deficiency anemia    Constipation    GERD without esophagitis    Type 2 diabetes mellitus    HTN (hypertension)    Heart failure    End stage COPD  on 3LNC    Atrial fibrillation    Hyperlipemia    Chronic sinusitis    Raynaud phenomenon    Claustrophobia    COPD (chronic obstructive pulmonary disease)    S/P tonsillectomy        FAMILY HISTORY:  FH: myocardial infarction    Family history of CABG    FH: MI (myocardial infarction)    FH: CABG (coronary artery bypass surgery)          MEDICATIONS   acetaminophen   Tablet .. 650 milliGRAM(s) Oral every 6 hours PRN  apixaban 5 milliGRAM(s) Oral every 12 hours  aspirin  chewable 81 milliGRAM(s) Oral daily  atorvastatin 80 milliGRAM(s) Oral at bedtime  azithromycin   Tablet 500 milliGRAM(s) Oral daily  Biotene Dry Mouth Oral Rinse 5 milliLiter(s) Swish and Spit two times a day  bisacodyl Suppository 10 milliGRAM(s) Rectal daily PRN  budesonide 160 MICROgram(s)/formoterol 4.5 MICROgram(s) Inhaler 2 Puff(s) Inhalation two times a day  cholecalciferol 1000 Unit(s) Oral daily  dextrose 40% Gel 15 Gram(s) Oral once PRN  dextrose 5%. 1000 milliLiter(s) IV Continuous <Continuous>  dextrose 50% Injectable 12.5 Gram(s) IV Push once  dextrose 50% Injectable 25 Gram(s) IV Push once  dextrose 50% Injectable 25 Gram(s) IV Push once  diltiazem    milliGRAM(s) Oral daily  fentaNYL   Patch  12 MICROgram(s)/Hr 1 Patch Transdermal every 72 hours  ferrous    sulfate 325 milliGRAM(s) Oral daily  glucagon  Injectable 1 milliGRAM(s) IntraMuscular once PRN  guaiFENesin  milliGRAM(s) Oral every 12 hours  insulin glargine Injectable (LANTUS) 12 Unit(s) SubCutaneous at bedtime  insulin lispro (HumaLOG) corrective regimen sliding scale   SubCutaneous three times a day before meals  insulin lispro (HumaLOG) corrective regimen sliding scale   SubCutaneous at bedtime  insulin lispro Injectable (HumaLOG) 4 Unit(s) SubCutaneous three times a day before meals  ipratropium    for Nebulization 500 MICROGram(s) Nebulizer every 6 hours  lactulose Syrup 15 Gram(s) Oral two times a day PRN  montelukast 10 milliGRAM(s) Oral at bedtime  multivitamin 1 Tablet(s) Oral daily  pantoprazole    Tablet 40 milliGRAM(s) Oral before breakfast  polyethylene glycol 3350 17 Gram(s) Oral daily  predniSONE   Tablet 30 milliGRAM(s) Oral daily  senna 2 Tablet(s) Oral at bedtime  tiotropium 18 MICROgram(s) Capsule 1 Capsule(s) Inhalation daily      Vital Signs Last 24 Hrs  T(C): 36.8 (16 Oct 2020 05:50), Max: 36.9 (15 Oct 2020 21:20)  T(F): 98.2 (16 Oct 2020 05:50), Max: 98.4 (15 Oct 2020 21:20)  HR: 78 (16 Oct 2020 05:50) (73 - 78)  BP: 134/76 (16 Oct 2020 05:50) (122/62 - 135/75)  BP(mean): --  RR: 20 (16 Oct 2020 05:50) (19 - 20)  SpO2: 100% (16 Oct 2020 05:50) (96% - 100%)      10-15-20 @ 07:01  -  10-16-20 @ 07:00  --------------------------------------------------------  IN: 480 mL / OUT: 400 mL / NET: 80 mL            LABS:                        8.6    9.28  )-----------( 312      ( 16 Oct 2020 06:02 )             29.9     10-16    141  |  100  |  30<H>  ----------------------------<  179<H>  4.9   |  38<H>  |  1.00    Ca    9.0      16 Oct 2020 06:02  Phos  3.3     10-15  Mg     2.1     10-15    TPro  5.9<L>  /  Alb  2.4<L>  /  TBili  0.2  /  DBili  x   /  AST  11<L>  /  ALT  16  /  AlkPhos  61  10-16              WBC:  WBC Count: 9.28 K/uL (10-16 @ 06:02)  WBC Count: 15.05 K/uL (10-15 @ 09:09)  WBC Count: 12.11 K/uL (10-14 @ 08:35)  WBC Count: 16.27 K/uL (10-13 @ 08:43)  WBC Count: 12.44 K/uL (10-12 @ 08:50)      MICROBIOLOGY:  RECENT CULTURES:  10-12 .Sputum Sputum XXXX   No polymorphonuclear leukocytes per low power field  Rare Squamous epithelial cells per low power field  Few Gram Negative Rods per oil power field  Rare Gram positive cocci in pairs per oil power field   Rare Mold like fungus  Normal Respiratory Samaria present                    Sodium:  Sodium, Serum: 141 mmol/L (10-16 @ 06:02)  Sodium, Serum: 140 mmol/L (10-15 @ 09:09)  Sodium, Serum: 139 mmol/L (10-14 @ 08:35)  Sodium, Serum: 140 mmol/L (10-13 @ 08:43)  Sodium, Serum: 140 mmol/L (10-12 @ 08:50)      1.00 mg/dL 10-16 @ 06:02  0.98 mg/dL 10-15 @ 09:09  0.87 mg/dL 10-14 @ 08:35  0.93 mg/dL 10-13 @ 08:43  0.94 mg/dL 10-12 @ 08:50      Hemoglobin:  Hemoglobin: 8.6 g/dL (10-16 @ 06:02)  Hemoglobin: 9.4 g/dL (10-15 @ 09:09)  Hemoglobin: 10.1 g/dL (10-14 @ 08:35)  Hemoglobin: 9.2 g/dL (10-13 @ 08:43)  Hemoglobin: 9.2 g/dL (10-12 @ 08:50)      Platelets: Platelet Count - Automated: 312 K/uL (10-16 @ 06:02)  Platelet Count - Automated: 291 K/uL (10-15 @ 09:09)  Platelet Count - Automated: 344 K/uL (10-14 @ 08:35)  Platelet Count - Automated: 372 K/uL (10-13 @ 08:43)  Platelet Count - Automated: 382 K/uL (10-12 @ 08:50)      LIVER FUNCTIONS - ( 16 Oct 2020 06:02 )  Alb: 2.4 g/dL / Pro: 5.9 g/dL / ALK PHOS: 61 U/L / ALT: 16 U/L / AST: 11 U/L / GGT: x                 RADIOLOGY & ADDITIONAL STUDIES:

## 2020-10-16 NOTE — CONSULT NOTE ADULT - SUBJECTIVE AND OBJECTIVE BOX
INCOMPLETE NOTE.  Documentation in Progress  PT SEEN AND EVALUATED.   FULL/ADDITIONAL RECOMMENDATIONS TO FOLLOW   ***************************************************************      Patient is a 60y old  Female who presents with a chief complaint of COPD exacerbation, PNA (16 Oct 2020 12:09)      HPI:  60F w/ end stage COPD on 3LNC (pending transplant list at [facility]), former smoker, CAD s/p stent (2016), afib on eliquis, uncontrolled DM2 (on insulin), raynaud phenomenon and recent hospitalization at [facility] for confusion and AURORA p/w sob. Sent from [facility]. Patient states that she has had worsening shortness of breath for past two days. Unable to obtain comprehensive history due to dyspnea. Patient denies fever, chills, sore throat, cough, chest pain, palpitations, abd pain, n/v. Currently feels short of breath even after receiving duonebs and steroids in the ED. Unable to keep mask on during interview and lay back in stretcher due to subjective sob. Patient repeatedly stating that it is too hot in her room and fanning herself with a paper. Per chart, Dr. Mathew (PCP) has chart notes regarding possible hallucinations. Would like her home medications now but otherwise has no acute complaints.    In the ED, VS T97.7F  /78 RR 21 SpO2 94% on 4LNC. Labs significant for mild leukocytosis of 11.41, H/H 9.9/30.5, thrombophilia of 435. CO2 35, albumin 2.4.   CXR done showing b/l patchy infiltrates, official read pending  CT chest done showing multifocal PNA and reactive mediastinal lymphadenopathy  EKG read limited by artifact, sinus tachycardia with RBBB and premature supraventricular complexes (06 Oct 2020 04:55)        PAST MEDICAL & SURGICAL HISTORY:  Ascorbic acid deficiency    Iron deficiency anemia    Constipation    GERD without esophagitis    Type 2 diabetes mellitus    HTN (hypertension)    Heart failure    End stage COPD  on 3LNC    Atrial fibrillation    Hyperlipemia    Chronic sinusitis    Raynaud phenomenon    Claustrophobia    COPD (chronic obstructive pulmonary disease)    S/P tonsillectomy         HEALTH ISSUES - PROBLEM Dx:  Uncontrolled type 2 diabetes mellitus with hyperglycemia  Uncontrolled type 2 diabetes mellitus with hyperglycemia    Acute on chronic respiratory failure with hypoxia and hypercapnia  Acute on chronic respiratory failure with hypoxia and hypercapnia    AURORA (acute kidney injury)  AURORA (acute kidney injury)    Pneumonia  Pneumonia    COPD (chronic obstructive pulmonary disease)  COPD (chronic obstructive pulmonary disease)    COPD exacerbation  COPD exacerbation    Need for prophylactic measure  Need for prophylactic measure    Chronic pain  Chronic pain    Constipation  Constipation    History of chronic atrial fibrillation  History of chronic atrial fibrillation    Heart failure  Heart failure    HTN (hypertension)  HTN (hypertension)    Type 2 diabetes mellitus  Type 2 diabetes mellitus    End stage COPD  End stage COPD    Acute respiratory failure with hypoxia and hypercapnia  Acute respiratory failure with hypoxia and hypercapnia            Chronic obstructive pulmonary disease [J44.9]    H/O Raynaud&#x27;s syndrome [Z86.79]    Ascorbic acid deficiency [E54]    Iron deficiency anemia [D50.9]    Constipation [K59.00]    GERD without esophagitis [K21.9]    Type 2 diabetes mellitus [E11.9]    HTN (hypertension) [I10]    Heart failure [I50.9]    HLD (hyperlipidemia) [E78.5]    History of chronic atrial fibrillation [Z86.79]    End stage COPD [J44.9]    Advanced COPD [J44.9]    Atrial fibrillation [I48.91]    Hyperlipemia [272.4]    Chronic sinusitis [473.9]    Raynaud phenomenon [443.0]    HTN (hypertension) [401.9]    DM (diabetes mellitus) [250.00]    Claustrophobia [300.29]    COPD (chronic obstructive pulmonary disease) [496]    History of tonsillectomy [Z90.89]    S/P tonsillectomy [V45.89]    Pneumonia [J18.9]        FAMILY HISTORY:  FH: myocardial infarction    Family history of CABG    FH: MI (myocardial infarction)    FH: CABG (coronary artery bypass surgery)          [SOCIAL HISTORY: ]     smoking:       EtOH:       illicit drugs:       occupation:       marital status:       Other:       [ALLERGIES/INTOLERANCES:]  Allergies    No Known Allergies    Intolerances    albuterol (Unknown)        [MEDICATIONS]  MEDICATIONS  (STANDING):  apixaban 5 milliGRAM(s) Oral every 12 hours  aspirin  chewable 81 milliGRAM(s) Oral daily  atorvastatin 80 milliGRAM(s) Oral at bedtime  azithromycin   Tablet 500 milliGRAM(s) Oral daily  Biotene Dry Mouth Oral Rinse 5 milliLiter(s) Swish and Spit two times a day  budesonide 160 MICROgram(s)/formoterol 4.5 MICROgram(s) Inhaler 2 Puff(s) Inhalation two times a day  cholecalciferol 1000 Unit(s) Oral daily  dextrose 5%. 1000 milliLiter(s) (50 mL/Hr) IV Continuous <Continuous>  dextrose 50% Injectable 12.5 Gram(s) IV Push once  dextrose 50% Injectable 25 Gram(s) IV Push once  dextrose 50% Injectable 25 Gram(s) IV Push once  diltiazem    milliGRAM(s) Oral daily  fentaNYL   Patch  12 MICROgram(s)/Hr 1 Patch Transdermal every 72 hours  ferrous    sulfate 325 milliGRAM(s) Oral daily  guaiFENesin  milliGRAM(s) Oral every 12 hours  insulin glargine Injectable (LANTUS) 12 Unit(s) SubCutaneous at bedtime  insulin lispro (HumaLOG) corrective regimen sliding scale   SubCutaneous three times a day before meals  insulin lispro (HumaLOG) corrective regimen sliding scale   SubCutaneous at bedtime  insulin lispro Injectable (HumaLOG) 4 Unit(s) SubCutaneous three times a day before meals  ipratropium    for Nebulization 500 MICROGram(s) Nebulizer every 6 hours  montelukast 10 milliGRAM(s) Oral at bedtime  multivitamin 1 Tablet(s) Oral daily  pantoprazole    Tablet 40 milliGRAM(s) Oral before breakfast  polyethylene glycol 3350 17 Gram(s) Oral daily  predniSONE   Tablet 30 milliGRAM(s) Oral daily  senna 2 Tablet(s) Oral at bedtime  tiotropium 18 MICROgram(s) Capsule 1 Capsule(s) Inhalation daily    MEDICATIONS  (PRN):  acetaminophen   Tablet .. 650 milliGRAM(s) Oral every 6 hours PRN Mild Pain (1 - 3)  ALPRAZolam 0.25 milliGRAM(s) Oral every 8 hours PRN anxiety  bisacodyl Suppository 10 milliGRAM(s) Rectal daily PRN Constipation  dextrose 40% Gel 15 Gram(s) Oral once PRN Blood Glucose LESS THAN 70 milliGRAM(s)/deciliter  glucagon  Injectable 1 milliGRAM(s) IntraMuscular once PRN Glucose LESS THAN 70 milligrams/deciliter  lactulose Syrup 15 Gram(s) Oral two times a day PRN constipation        [REVIEW OF SYSTEMS: ]  CONSTITUTIONAL: normal, no fever, no shakes, no chills   EYES: No eye pain, no visual disturbances, no discharge  ENMT:  no discharge  NECK: No pain, no stiffness  BREASTS: No pain, no masses, no nipple discharge  RESPIRATORY: No cough, no wheezing, no chills, no hemoptysis; No shortness of breath  CARDIOVASCULAR: No chest pain, no palpitations, no dizziness, no leg swelling  GASTROINTESTINAL: No abdominal, no epigastric pain. No nausea, no vomiting, no hematemesis; No diarrhea , no constipation. No melena, no hematochezia.  GENITOURINARY: No dysuria, no frequency, no hematuria, no incontinence  NEUROLOGICAL: No headaches, no memory loss, no loss of strength, no numbness, no tremors  SKIN: No itching, no burning, no rashes, no lesions   LYMPH NODES: No enlarged glands  ENDOCRINE: No heat or cold intolerance; No hair loss  MUSCULOSKELETAL: No joint pain or swelling; No muscle, no back, no extremity pain  PSYCHIATRIC: No depression, no anxiety, no mood swings, no difficulty sleeping  HEME/LYMPH: No easy bruising, no bleeding gums      [VITALS SIGNS 24hrs]  Vital Signs Last 24 Hrs  T(C): 36.8 (16 Oct 2020 12:19), Max: 36.9 (15 Oct 2020 21:20)  T(F): 98.2 (16 Oct 2020 12:19), Max: 98.4 (15 Oct 2020 21:20)  HR: 75 (16 Oct 2020 19:21) (73 - 79)  BP: 114/73 (16 Oct 2020 12:19) (114/73 - 134/76)  BP(mean): --  RR: 20 (16 Oct 2020 12:19) (20 - 20)  SpO2: 100% (16 Oct 2020 17:33) (96% - 100%)    [PHYSICAL EXAM]  General: adult in NAD,  WN,  WD.  HEENT: clear oropharynx, anicteric sclera, pink conjunctivae.  Neck: supple, no masses.  CV: normal S1S2, no murmur, no rubs, no gallops.  Lungs: clear to auscultation, no wheezes, no rales, no rhonchi.  Abdomen: soft, non-tender, non-distended, no hepatosplenomegaly, normal BS, no guarding.  Ext: no clubbing, no cyanosis, no edema.  Skin: no rashes,  no petechiae, no venous stasis changes.  Neuro: alert and oriented X3, no focal motor deficits.  LN: no SC URIAH.      [LABS: ]                        8.6    9.28  )-----------( 312      ( 16 Oct 2020 06:02 )             29.9     CBC Full  -  ( 16 Oct 2020 06:02 )  WBC Count : 9.28 K/uL  RBC Count : 3.60 M/uL  Hemoglobin : 8.6 g/dL  Hematocrit : 29.9 %  Platelet Count - Automated : 312 K/uL  Mean Cell Volume : 83.1 fl  Mean Cell Hemoglobin : 23.9 pg  Mean Cell Hemoglobin Concentration : 28.8 gm/dL  Auto Neutrophil # : 6.56 K/uL  Auto Lymphocyte # : 1.28 K/uL  Auto Monocyte # : 0.95 K/uL  Auto Eosinophil # : 0.33 K/uL  Auto Basophil # : 0.01 K/uL  Auto Neutrophil % : 70.7 %  Auto Lymphocyte % : 13.8 %  Auto Monocyte % : 10.2 %  Auto Eosinophil % : 3.6 %  Auto Basophil % : 0.1 %    10-16    141  |  100  |  30<H>  ----------------------------<  179<H>  4.9   |  38<H>  |  1.00    Ca    9.0      16 Oct 2020 06:02  Phos  3.3     10-15  Mg     2.1     10-15    TPro  5.9<L>  /  Alb  2.4<L>  /  TBili  0.2  /  DBili  x   /  AST  11<L>  /  ALT  16  /  AlkPhos  61  10-16      LIVER FUNCTIONS - ( 16 Oct 2020 06:02 )  Alb: 2.4 g/dL / Pro: 5.9 g/dL / ALK PHOS: 61 U/L / ALT: 16 U/L / AST: 11 U/L / GGT: x                   WBC  TREND (5 Days)  WBC Count: 9.28 K/uL (10-16 @ 06:02)  WBC Count: 15.05 K/uL (10-15 @ 09:09)  WBC Count: 12.11 K/uL (10-14 @ 08:35)    HGB  TREND (5 Days)  Hemoglobin: 8.6 g/dL (10-16 @ 06:02)  Hemoglobin: 9.4 g/dL (10-15 @ 09:09)  Hemoglobin: 10.1 g/dL (10-14 @ 08:35)    HCT  TREND (5 Days)  Hematocrit: 29.9 % (10-16 @ 06:02)  Hematocrit: 31.7 % (10-15 @ 09:09)  Hematocrit: 34.6 % (10-14 @ 08:35)    PLT  TREND (5 Days)  Platelet Count - Automated: 312 K/uL (10-16 @ 06:02)  Platelet Count - Automated: 291 K/uL (10-15 @ 09:09)  Platelet Count - Automated: 344 K/uL (10-14 @ 08:35)      Anemia Studies  Reticulocyte Percent: 2.0 % (08-26 @ 13:10)      Ferritin, Serum: 96 ng/mL (10-09 @ 12:19)  Ferritin, Serum: 22 ng/mL (08-26 @ 15:51)    Iron - Total Binding Capacity.: 236 ug/dL (10-09 @ 12:04)  Iron - Total Binding Capacity.: 371 ug/dL (08-26 @ 20:00)     Vitamin B12, Serum: 908 pg/mL (08-26 @ 15:51)             ***********************  Myeloma Studies  Serum Protein Electrophoresis Interp: Gamma-Migrating Paraprotein Identified (10-09 @ 09:23)     Immunofixation, Serum:   IgM Lambda Band Identified    Reference Range: None Detected (10-09 @ 09:23)                       Microbiology        [PATHOLOGY]       **********************      [RADIOLOGY & ADDITIONAL STUDIES:]        Patient is a 60y old  Female who presents with a chief complaint of COPD exacerbation, PNA (16 Oct 2020 12:09)      HPI:  60F w/ end stage COPD on 3LNC (pending transplant list at [facility]), former smoker, CAD s/p stent (2016), afib on eliquis, uncontrolled DM2 (on insulin), raynaud phenomenon and recent hospitalization at [facility] for confusion and [facility] p/w sob. [facility] Patient states that she has had worsening shortness of breath for past two days. Unable to obtain comprehensive history due to dyspnea. Patient denies fever, chills, sore throat, cough, chest pain, palpitations, abd pain, n/v. Currently feels short of breath even after receiving duonebs and steroids in the ED. Unable to keep mask on during interview and lay back in stretcher due to subjective sob. Patient repeatedly stating that it is too hot in her room and fanning herself with a paper. Per chart, Dr. Mathew (PCP) has chart notes regarding possible hallucinations. Would like her home medications now but otherwise has no acute complaints.    In the ED, VS T97.7F  /78 RR 21 SpO2 94% on 4LNC. Labs significant for mild leukocytosis of 11.41, H/H 9.9/30.5, thrombophilia of 435. CO2 35, albumin 2.4.   CXR done showing b/l patchy infiltrates, official read pending  CT chest done showing multifocal PNA and reactive mediastinal lymphadenopathy  EKG read limited by artifact, sinus tachycardia with RBBB and premature supraventricular complexes (06 Oct 2020 04:55)    Pt admitted with SOB.   Anemia workup wiht MGSamantha HICKS asked to eval.         PAST MEDICAL & SURGICAL HISTORY:  Ascorbic acid deficiency  Iron deficiency anemia  Constipation  GERD without esophagitis  Type 2 diabetes mellitus  HTN (hypertension)  Heart failure  End stage COPD on 3LNC  Atrial fibrillation  Hyperlipemia  Chronic sinusitis  Raynaud phenomenon  Claustrophobia  COPD (chronic obstructive pulmonary disease)  S/P tonsillectomy       HEALTH ISSUES - PROBLEM Dx:  Uncontrolled type 2 diabetes mellitus with hyperglycemia  Acute on chronic respiratory failure with hypoxia and hypercapnia  AURORA (acute kidney injury)  Pneumonia  COPD (chronic obstructive pulmonary disease)  COPD exacerbation  Need for prophylactic measure  Chronic pain  Constipation  History of chronic atrial fibrillation  Heart failure  HTN (hypertension)  Type 2 diabetes mellitus  End stage COPD  Acute respiratory failure with hypoxia and hypercapnia    Chronic obstructive pulmonary disease [J44.9]  H/O Raynaud&#x27;s syndrome [Z86.79]  Ascorbic acid deficiency [E54]  Iron deficiency anemia [D50.9]  Constipation [K59.00]  GERD without esophagitis [K21.9]  Type 2 diabetes mellitus [E11.9]  HTN (hypertension) [I10]  Heart failure [I50.9]  HLD (hyperlipidemia) [E78.5]  History of chronic atrial fibrillation [Z86.79]  End stage COPD [J44.9]  Advanced COPD [J44.9]  Atrial fibrillation [I48.91]  Hyperlipemia [272.4]  Chronic sinusitis [473.9]  Raynaud phenomenon [443.0]  HTN (hypertension) [401.9]  DM (diabetes mellitus) [250.00]  Claustrophobia [300.29]  COPD (chronic obstructive pulmonary disease) [496]  History of tonsillectomy [Z90.89]  S/P tonsillectomy [V45.89]  Pneumonia [J18.9]      FAMILY HISTORY:  FH: myocardial infarction  Family history of CABG  FH: MI (myocardial infarction)  FH: CABG (coronary artery bypass surgery)    older brother prostate cancer  younger brother CAD  father    mother        [SOCIAL HISTORY: ]     smoking:  ex-smoker     EtOH:  denies     illicit drugs:  denies     occupation:  office work     marital status:  single , no children     Other:       [ALLERGIES/INTOLERANCES:]  Allergies     No Known Allergies  Intolerances     albuterol (Unknown)    [MEDICATIONS]  MEDICATIONS  (STANDING):  apixaban 5 milliGRAM(s) Oral every 12 hours  aspirin  chewable 81 milliGRAM(s) Oral daily  atorvastatin 80 milliGRAM(s) Oral at bedtime  azithromycin   Tablet 500 milliGRAM(s) Oral daily  Biotene Dry Mouth Oral Rinse 5 milliLiter(s) Swish and Spit two times a day  budesonide 160 MICROgram(s)/formoterol 4.5 MICROgram(s) Inhaler 2 Puff(s) Inhalation two times a day  cholecalciferol 1000 Unit(s) Oral daily  dextrose 5%. 1000 milliLiter(s) (50 mL/Hr) IV Continuous <Continuous>  dextrose 50% Injectable 12.5 Gram(s) IV Push once  dextrose 50% Injectable 25 Gram(s) IV Push once  dextrose 50% Injectable 25 Gram(s) IV Push once  diltiazem    milliGRAM(s) Oral daily  fentaNYL   Patch  12 MICROgram(s)/Hr 1 Patch Transdermal every 72 hours  ferrous    sulfate 325 milliGRAM(s) Oral daily  guaiFENesin  milliGRAM(s) Oral every 12 hours  insulin glargine Injectable (LANTUS) 12 Unit(s) SubCutaneous at bedtime  insulin lispro (HumaLOG) corrective regimen sliding scale   SubCutaneous three times a day before meals  insulin lispro (HumaLOG) corrective regimen sliding scale   SubCutaneous at bedtime  insulin lispro Injectable (HumaLOG) 4 Unit(s) SubCutaneous three times a day before meals  ipratropium    for Nebulization 500 MICROGram(s) Nebulizer every 6 hours  montelukast 10 milliGRAM(s) Oral at bedtime  multivitamin 1 Tablet(s) Oral daily  pantoprazole    Tablet 40 milliGRAM(s) Oral before breakfast  polyethylene glycol 3350 17 Gram(s) Oral daily  predniSONE   Tablet 30 milliGRAM(s) Oral daily  senna 2 Tablet(s) Oral at bedtime  tiotropium 18 MICROgram(s) Capsule 1 Capsule(s) Inhalation daily    MEDICATIONS  (PRN):  acetaminophen   Tablet .. 650 milliGRAM(s) Oral every 6 hours PRN Mild Pain (1 - 3)  ALPRAZolam 0.25 milliGRAM(s) Oral every 8 hours PRN anxiety  bisacodyl Suppository 10 milliGRAM(s) Rectal daily PRN Constipation  dextrose 40% Gel 15 Gram(s) Oral once PRN Blood Glucose LESS THAN 70 milliGRAM(s)/deciliter  glucagon  Injectable 1 milliGRAM(s) IntraMuscular once PRN Glucose LESS THAN 70 milligrams/deciliter  lactulose Syrup 15 Gram(s) Oral two times a day PRN constipation        [REVIEW OF SYSTEMS: ]  CONSTITUTIONAL: +confusion, no fever, no shakes, no chills   EYES: No eye pain, no visual disturbances, no discharge  ENMT:  no discharge  NECK: No pain, no stiffness  BREASTS: No pain, no masses, no nipple discharge  RESPIRATORY: No cough, no wheezing, no chills, no hemoptysis; +shortness of breath  CARDIOVASCULAR: No chest pain, no palpitations, no dizziness, no leg swelling  GASTROINTESTINAL: No abdominal, no epigastric pain. No nausea, no vomiting, no hematemesis; No diarrhea , no constipation. No melena, no hematochezia.  GENITOURINARY: No dysuria, no frequency, no hematuria, no incontinence  NEUROLOGICAL: No headaches, no memory loss, no loss of strength, no numbness, no tremors  SKIN: No itching, no burning, no rashes, no lesions   LYMPH NODES: No enlarged glands  ENDOCRINE: No heat or cold intolerance; No hair loss  MUSCULOSKELETAL: No joint pain or swelling; No muscle, no back, no extremity pain  PSYCHIATRIC: No depression, no anxiety, no mood swings, no difficulty sleeping  HEME/LYMPH: No easy bruising, no bleeding gums      [VITALS SIGNS 24hrs]  Vital Signs Last 24 Hrs  T(C): 36.8 (16 Oct 2020 12:19), Max: 36.9 (15 Oct 2020 21:20)  T(F): 98.2 (16 Oct 2020 12:19), Max: 98.4 (15 Oct 2020 21:20)  HR: 75 (16 Oct 2020 19:21) (73 - 79)  BP: 114/73 (16 Oct 2020 12:19) (114/73 - 134/76)  BP(mean): --  RR: 20 (16 Oct 2020 12:19) (20 - 20)  SpO2: 100% (16 Oct 2020 17:33) (96% - 100%)    [PHYSICAL EXAM]  General: adult in NAD,  WN,  WD.  HEENT: clear oropharynx, anicteric sclera, pink conjunctivae.  Neck: supple, no masses.  CV: normal S1S2, no murmur, no rubs, no gallops.  Lungs: clear to auscultation, no wheezes, no rales, no rhonchi.  Abdomen: soft, non-tender, non-distended, no hepatosplenomegaly, normal BS, no guarding.  Ext: no clubbing, no cyanosis, no edema.  Skin: no rashes,  no petechiae, no venous stasis changes.  Neuro: alert and oriented X3, no focal motor deficits.  LN: no SC URIAH.      [LABS: ]                        8.6    9.28  )-----------( 312      ( 16 Oct 2020 06:02 )             29.9     CBC Full  -  ( 16 Oct 2020 06:02 )  WBC Count : 9.28 K/uL  RBC Count : 3.60 M/uL  Hemoglobin : 8.6 g/dL  Hematocrit : 29.9 %  Platelet Count - Automated : 312 K/uL  Mean Cell Volume : 83.1 fl  Mean Cell Hemoglobin : 23.9 pg  Mean Cell Hemoglobin Concentration : 28.8 gm/dL  Auto Neutrophil # : 6.56 K/uL  Auto Lymphocyte # : 1.28 K/uL  Auto Monocyte # : 0.95 K/uL  Auto Eosinophil # : 0.33 K/uL  Auto Basophil # : 0.01 K/uL  Auto Neutrophil % : 70.7 %  Auto Lymphocyte % : 13.8 %  Auto Monocyte % : 10.2 %  Auto Eosinophil % : 3.6 %  Auto Basophil % : 0.1 %    10-16    141  |  100  |  30<H>  ----------------------------<  179<H>  4.9   |  38<H>  |  1.00    Ca    9.0      16 Oct 2020 06:02  Phos  3.3     10-15  Mg     2.1     10-15    TPro  5.9<L>  /  Alb  2.4<L>  /  TBili  0.2  /  DBili  x   /  AST  11<L>  /  ALT  16  /  AlkPhos  61  10-16      LIVER FUNCTIONS - ( 16 Oct 2020 06:02 )  Alb: 2.4 g/dL / Pro: 5.9 g/dL / ALK PHOS: 61 U/L / ALT: 16 U/L / AST: 11 U/L / GGT: x                   WBC  TREND (5 Days)  WBC Count: 9.28 K/uL (10-16 @ 06:02)  WBC Count: 15.05 K/uL (10-15 @ 09:09)  WBC Count: 12.11 K/uL (10-14 @ 08:35)    HGB  TREND (5 Days)  Hemoglobin: 8.6 g/dL (10-16 @ 06:02)  Hemoglobin: 9.4 g/dL (10-15 @ 09:09)  Hemoglobin: 10.1 g/dL (10-14 @ 08:35)    HCT  TREND (5 Days)  Hematocrit: 29.9 % (10-16 @ 06:02)  Hematocrit: 31.7 % (10-15 @ 09:09)  Hematocrit: 34.6 % (10-14 @ 08:35)    PLT  TREND (5 Days)  Platelet Count - Automated: 312 K/uL (10-16 @ 06:02)  Platelet Count - Automated: 291 K/uL (10-15 @ 09:09)  Platelet Count - Automated: 344 K/uL (10-14 @ 08:35)      Anemia Studies  Reticulocyte Percent: 2.0 % ( @ 13:10)      Ferritin, Serum: 96 ng/mL (10-09 @ 12:19)  Ferritin, Serum: 22 ng/mL ( @ 15:51)    Iron - Total Binding Capacity.: 236 ug/dL (10-09 @ 12:04)  Iron - Total Binding Capacity.: 371 ug/dL ( @ 20:00)     Vitamin B12, Serum: 908 pg/mL ( @ 15:51)        Myeloma Studies  Protein Electrophoresis, Serum (10.09.20 @ 09:23)   Total Protein, Serum: 6.0 g/dL   Albumin, Serum: 2.8 g/dL   Alpha 1: 0.6 g/dL   Alpha 2: 1.2 g/dL   Beta Globulin: 0.7 g/dL   Gamma Globulin: 0.7 g/dL   M-Maico: 0.1 g/dL   % Albumin: 47.2 %   % Alpha 1: 9.3 %   % Alpha 2: 19.4 %   % Beta: 12.2 %   % Gamma: 11.9 %   % M Maico: 1.2 %   Albumin/Globulin Ratio: 0.9 Ratio   Serum Protein Electrophoresis Interp: Gamma-Migrating Paraprotein Identified (10-09 @ 09:23)    Immunofixation, Serum:  IgM Lambda Band Identified  Reference Range: None Detected (10-09 @ 09:23)             [RADIOLOGY & ADDITIONAL STUDIES:]     EXAM:  XR CHEST PORTABLE IMMED 1V                        PROCEDURE DATE:  10/10/2020    INTERPRETATION:  INDICATION: Pneumonia; follow-up assessment.  PRIORS: 10/8/2020.  VIEWS: Portable AP radiography of the chest performed.  FINDINGS: Heart size appears within normal limits. No hilar or superior mediastinal abnormalities are identified. Infiltrates within the bilateral mid to lower lung fields are without significant change. No pleural effusion or pneumothorax is demonstrated.No mediastinal shift is noted. The visualized osseous structures appear unremarkable.  IMPRESSION: No significant interval change.      EXAM:  US KIDNEY(S)                        PROCEDURE DATE:  10/09/2020    INTERPRETATION:  History: Renal failure.  Bilateral renal ultrasound. Prior 2018  Limited due to patient respiratory motion and shadowing from bowel gas, asper technologist note.  Right kidney 9.4 the left 9.8 cm long dimension. No hydronephrosis calculus solid or cystic renal mass bilaterally.  Bladder distends to 82 cc. No post void residual was obtained.  Impression:  No hydronephrosis.        EXAM:  CT CHEST                        PROCEDURE DATE:  10/06/2020    INTERPRETATION:  CLINICAL INFORMATION: Rule out pneumonia. Chronic obstructive pulmonary disease.  COMPARISON: Same day radiograph  PROCEDURE:  CT of the Chest was performed without intravenous contrast.  Sagittal and coronal reformats were performed. 3-D MIPS images were acquired on the axial plane.  FINDINGS:  LUNGS AND AIRWAYS: Patent central airways.  Centrilobular emphysema in both lungs. Numerous nodular densities with septal thickening showing tree-in-bud appearance in the right upper lobe, right middle lobe, left upper lobe and left lower lobe. There is no atelectasis.  PLEURA: No pleural effusion.  MEDIASTINUM AND LILIANA: 1.4 cm sized right paratracheal lymph node. A few subcentimeter mediastinal lymph nodes.  VESSELS: Moderate atherosclerotic calcification.  HEART: Heart size is normal. No pericardial effusion.  CHEST WALL AND LOWER NECK: Within normal limits.  VISUALIZED UPPER ABDOMEN: Within normal limits.  BONES: Degenerative change.  IMPRESSION:  Multifocal pneumonia involving both lungs as described.  Reactive mediastinal lymphadenopathy.

## 2020-10-16 NOTE — PROGRESS NOTE ADULT - SUBJECTIVE AND OBJECTIVE BOX
Patient is a 60y old  Female who presents with a chief complaint of COPD exacerbation, PNA (16 Oct 2020 08:49)      INTERVAL HPI/OVERNIGHT EVENTS: Patient seen and examined at bedside. No overnight events occurred. Patient has no complaints at this time. Denies fevers, chills, headache, lightheadedness, chest pain, dyspnea, abdominal pain, n/v/d/c.    MEDICATIONS  (STANDING):  apixaban 5 milliGRAM(s) Oral every 12 hours  aspirin  chewable 81 milliGRAM(s) Oral daily  atorvastatin 80 milliGRAM(s) Oral at bedtime  azithromycin   Tablet 500 milliGRAM(s) Oral daily  Biotene Dry Mouth Oral Rinse 5 milliLiter(s) Swish and Spit two times a day  budesonide 160 MICROgram(s)/formoterol 4.5 MICROgram(s) Inhaler 2 Puff(s) Inhalation two times a day  cholecalciferol 1000 Unit(s) Oral daily  dextrose 5%. 1000 milliLiter(s) (50 mL/Hr) IV Continuous <Continuous>  dextrose 50% Injectable 12.5 Gram(s) IV Push once  dextrose 50% Injectable 25 Gram(s) IV Push once  dextrose 50% Injectable 25 Gram(s) IV Push once  diltiazem    milliGRAM(s) Oral daily  fentaNYL   Patch  12 MICROgram(s)/Hr 1 Patch Transdermal every 72 hours  ferrous    sulfate 325 milliGRAM(s) Oral daily  guaiFENesin  milliGRAM(s) Oral every 12 hours  insulin glargine Injectable (LANTUS) 12 Unit(s) SubCutaneous at bedtime  insulin lispro (HumaLOG) corrective regimen sliding scale   SubCutaneous three times a day before meals  insulin lispro (HumaLOG) corrective regimen sliding scale   SubCutaneous at bedtime  insulin lispro Injectable (HumaLOG) 4 Unit(s) SubCutaneous three times a day before meals  ipratropium    for Nebulization 500 MICROGram(s) Nebulizer every 6 hours  montelukast 10 milliGRAM(s) Oral at bedtime  multivitamin 1 Tablet(s) Oral daily  pantoprazole    Tablet 40 milliGRAM(s) Oral before breakfast  polyethylene glycol 3350 17 Gram(s) Oral daily  predniSONE   Tablet 30 milliGRAM(s) Oral daily  senna 2 Tablet(s) Oral at bedtime  tiotropium 18 MICROgram(s) Capsule 1 Capsule(s) Inhalation daily    MEDICATIONS  (PRN):  acetaminophen   Tablet .. 650 milliGRAM(s) Oral every 6 hours PRN Mild Pain (1 - 3)  bisacodyl Suppository 10 milliGRAM(s) Rectal daily PRN Constipation  dextrose 40% Gel 15 Gram(s) Oral once PRN Blood Glucose LESS THAN 70 milliGRAM(s)/deciliter  glucagon  Injectable 1 milliGRAM(s) IntraMuscular once PRN Glucose LESS THAN 70 milligrams/deciliter  lactulose Syrup 15 Gram(s) Oral two times a day PRN constipation      Allergies    No Known Allergies    Intolerances    albuterol (Unknown)      REVIEW OF SYSTEMS:  CONSTITUTIONAL: No fever or chills  HEENT:  No headache, no sore throat  RESPIRATORY: No cough, wheezing, or shortness of breath  CARDIOVASCULAR: No chest pain, palpitations  GASTROINTESTINAL: No abd pain, nausea, vomiting, or diarrhea  GENITOURINARY: No dysuria, frequency, or hematuria  NEUROLOGICAL: no focal weakness or dizziness  MUSCULOSKELETAL: no myalgias     Vital Signs Last 24 Hrs  T(C): 36.8 (16 Oct 2020 05:50), Max: 36.9 (15 Oct 2020 21:20)  T(F): 98.2 (16 Oct 2020 05:50), Max: 98.4 (15 Oct 2020 21:20)  HR: 78 (16 Oct 2020 05:50) (73 - 78)  BP: 134/76 (16 Oct 2020 05:50) (122/62 - 135/75)  BP(mean): --  RR: 20 (16 Oct 2020 05:50) (19 - 20)  SpO2: 100% (16 Oct 2020 05:50) (96% - 100%)    PHYSICAL EXAM:  CONSTITUTIONAL: No fever or chills  HEENT:  No headache, no sore throat  RESPIRATORY: +cough, +shortness of breath; no wheezing   CARDIOVASCULAR: No chest pain, palpitations  GASTROINTESTINAL: No abd pain, nausea, vomiting, or diarrhea  GENITOURINARY: No dysuria, frequency, or hematuria  NEUROLOGICAL: no focal weakness or dizziness  MUSCULOSKELETAL: no myalgias     LABS:                        8.6    9.28  )-----------( 312      ( 16 Oct 2020 06:02 )             29.9     CBC Full  -  ( 16 Oct 2020 06:02 )  WBC Count : 9.28 K/uL  Hemoglobin : 8.6 g/dL  Hematocrit : 29.9 %  Platelet Count - Automated : 312 K/uL  Mean Cell Volume : 83.1 fl  Mean Cell Hemoglobin : 23.9 pg  Mean Cell Hemoglobin Concentration : 28.8 gm/dL  Auto Neutrophil # : 6.56 K/uL  Auto Lymphocyte # : 1.28 K/uL  Auto Monocyte # : 0.95 K/uL  Auto Eosinophil # : 0.33 K/uL  Auto Basophil # : 0.01 K/uL  Auto Neutrophil % : 70.7 %  Auto Lymphocyte % : 13.8 %  Auto Monocyte % : 10.2 %  Auto Eosinophil % : 3.6 %  Auto Basophil % : 0.1 %    16 Oct 2020 06:02    141    |  100    |  30     ----------------------------<  179    4.9     |  38     |  1.00     Ca    9.0        16 Oct 2020 06:02  Phos  3.3       15 Oct 2020 09:09  Mg     2.1       15 Oct 2020 09:09    TPro  5.9    /  Alb  2.4    /  TBili  0.2    /  DBili  x      /  AST  11     /  ALT  16     /  AlkPhos  61     16 Oct 2020 06:02        CAPILLARY BLOOD GLUCOSE      POCT Blood Glucose.: 197 mg/dL (16 Oct 2020 08:19)  POCT Blood Glucose.: 164 mg/dL (15 Oct 2020 21:43)  POCT Blood Glucose.: 189 mg/dL (15 Oct 2020 17:05)  POCT Blood Glucose.: 215 mg/dL (15 Oct 2020 11:53)        Culture - Sputum (collected 10-12-20 @ 16:44)  Source: .Sputum Sputum  Gram Stain (10-12-20 @ 19:22):    No polymorphonuclear leukocytes per low power field    Rare Squamous epithelial cells per low power field    Few Gram Negative Rods per oil power field    Rare Gram positive cocci in pairs per oil power field  Preliminary Report (10-14-20 @ 18:35):    Rare Mold like fungus    Normal Respiratory Samaria present        RADIOLOGY & ADDITIONAL TESTS:    Personally reviewed.     Consultant(s) Notes Reviewed:  [x] YES  [ ] NO       Patient is a 60y old  Female who presents with a chief complaint of COPD exacerbation, PNA (16 Oct 2020 08:49)      INTERVAL HPI/OVERNIGHT EVENTS: Patient seen and examined at bedside. No overnight events occurred. She states that she is still feeling short of breath with minimal exertion; on 5L NC currently. Denies fevers, chills, headache, lightheadedness, chest pain, dyspnea, abdominal pain, n/v/d/c.    MEDICATIONS  (STANDING):  apixaban 5 milliGRAM(s) Oral every 12 hours  aspirin  chewable 81 milliGRAM(s) Oral daily  atorvastatin 80 milliGRAM(s) Oral at bedtime  azithromycin   Tablet 500 milliGRAM(s) Oral daily  Biotene Dry Mouth Oral Rinse 5 milliLiter(s) Swish and Spit two times a day  budesonide 160 MICROgram(s)/formoterol 4.5 MICROgram(s) Inhaler 2 Puff(s) Inhalation two times a day  cholecalciferol 1000 Unit(s) Oral daily  dextrose 5%. 1000 milliLiter(s) (50 mL/Hr) IV Continuous <Continuous>  dextrose 50% Injectable 12.5 Gram(s) IV Push once  dextrose 50% Injectable 25 Gram(s) IV Push once  dextrose 50% Injectable 25 Gram(s) IV Push once  diltiazem    milliGRAM(s) Oral daily  fentaNYL   Patch  12 MICROgram(s)/Hr 1 Patch Transdermal every 72 hours  ferrous    sulfate 325 milliGRAM(s) Oral daily  guaiFENesin  milliGRAM(s) Oral every 12 hours  insulin glargine Injectable (LANTUS) 12 Unit(s) SubCutaneous at bedtime  insulin lispro (HumaLOG) corrective regimen sliding scale   SubCutaneous three times a day before meals  insulin lispro (HumaLOG) corrective regimen sliding scale   SubCutaneous at bedtime  insulin lispro Injectable (HumaLOG) 4 Unit(s) SubCutaneous three times a day before meals  ipratropium    for Nebulization 500 MICROGram(s) Nebulizer every 6 hours  montelukast 10 milliGRAM(s) Oral at bedtime  multivitamin 1 Tablet(s) Oral daily  pantoprazole    Tablet 40 milliGRAM(s) Oral before breakfast  polyethylene glycol 3350 17 Gram(s) Oral daily  predniSONE   Tablet 30 milliGRAM(s) Oral daily  senna 2 Tablet(s) Oral at bedtime  tiotropium 18 MICROgram(s) Capsule 1 Capsule(s) Inhalation daily    MEDICATIONS  (PRN):  acetaminophen   Tablet .. 650 milliGRAM(s) Oral every 6 hours PRN Mild Pain (1 - 3)  bisacodyl Suppository 10 milliGRAM(s) Rectal daily PRN Constipation  dextrose 40% Gel 15 Gram(s) Oral once PRN Blood Glucose LESS THAN 70 milliGRAM(s)/deciliter  glucagon  Injectable 1 milliGRAM(s) IntraMuscular once PRN Glucose LESS THAN 70 milligrams/deciliter  lactulose Syrup 15 Gram(s) Oral two times a day PRN constipation      Allergies    No Known Allergies    Intolerances    albuterol (Unknown)      REVIEW OF SYSTEMS:  CONSTITUTIONAL: No fever or chills  HEENT:  No headache, no sore throat  RESPIRATORY: +shortness of breath; no cough, wheezing  CARDIOVASCULAR: No chest pain, palpitations  GASTROINTESTINAL: No abd pain, nausea, vomiting, or diarrhea  GENITOURINARY: No dysuria, frequency, or hematuria  NEUROLOGICAL: no focal weakness or dizziness  MUSCULOSKELETAL: no myalgias     Vital Signs Last 24 Hrs  T(C): 36.8 (16 Oct 2020 05:50), Max: 36.9 (15 Oct 2020 21:20)  T(F): 98.2 (16 Oct 2020 05:50), Max: 98.4 (15 Oct 2020 21:20)  HR: 78 (16 Oct 2020 05:50) (73 - 78)  BP: 134/76 (16 Oct 2020 05:50) (122/62 - 135/75)  BP(mean): --  RR: 20 (16 Oct 2020 05:50) (19 - 20)  SpO2: 100% (16 Oct 2020 05:50) (96% - 100%)    PHYSICAL EXAM:  GENERAL: NAD, sitting upright in bed   HEENT:  anicteric, moist mucous membranes  CHEST/LUNG:  decreased breath sounds b/l, no rales, wheezes, or rhonchi  HEART:  RRR, S1, S2; no murmurs, rubs or gallops   ABDOMEN:  BS+, soft, nontender, nondistended  EXTREMITIES: no edema, cyanosis, or calf tenderness  NERVOUS SYSTEM: answers questions and follows commands appropriately      LABS:                        8.6    9.28  )-----------( 312      ( 16 Oct 2020 06:02 )             29.9     CBC Full  -  ( 16 Oct 2020 06:02 )  WBC Count : 9.28 K/uL  Hemoglobin : 8.6 g/dL  Hematocrit : 29.9 %  Platelet Count - Automated : 312 K/uL  Mean Cell Volume : 83.1 fl  Mean Cell Hemoglobin : 23.9 pg  Mean Cell Hemoglobin Concentration : 28.8 gm/dL  Auto Neutrophil # : 6.56 K/uL  Auto Lymphocyte # : 1.28 K/uL  Auto Monocyte # : 0.95 K/uL  Auto Eosinophil # : 0.33 K/uL  Auto Basophil # : 0.01 K/uL  Auto Neutrophil % : 70.7 %  Auto Lymphocyte % : 13.8 %  Auto Monocyte % : 10.2 %  Auto Eosinophil % : 3.6 %  Auto Basophil % : 0.1 %    16 Oct 2020 06:02    141    |  100    |  30     ----------------------------<  179    4.9     |  38     |  1.00     Ca    9.0        16 Oct 2020 06:02  Phos  3.3       15 Oct 2020 09:09  Mg     2.1       15 Oct 2020 09:09    TPro  5.9    /  Alb  2.4    /  TBili  0.2    /  DBili  x      /  AST  11     /  ALT  16     /  AlkPhos  61     16 Oct 2020 06:02        CAPILLARY BLOOD GLUCOSE      POCT Blood Glucose.: 197 mg/dL (16 Oct 2020 08:19)  POCT Blood Glucose.: 164 mg/dL (15 Oct 2020 21:43)  POCT Blood Glucose.: 189 mg/dL (15 Oct 2020 17:05)  POCT Blood Glucose.: 215 mg/dL (15 Oct 2020 11:53)        Culture - Sputum (collected 10-12-20 @ 16:44)  Source: .Sputum Sputum  Gram Stain (10-12-20 @ 19:22):    No polymorphonuclear leukocytes per low power field    Rare Squamous epithelial cells per low power field    Few Gram Negative Rods per oil power field    Rare Gram positive cocci in pairs per oil power field  Preliminary Report (10-14-20 @ 18:35):    Rare Mold like fungus    Normal Respiratory Samaria present        RADIOLOGY & ADDITIONAL TESTS:    Personally reviewed.     Consultant(s) Notes Reviewed:  [x] YES  [ ] NO       Patient is a 60y old  Female who presents with a chief complaint of COPD exacerbation, PNA (16 Oct 2020 08:49)      INTERVAL HPI/OVERNIGHT EVENTS: Patient seen and examined at bedside. No overnight events occurred. Patient seen and examined at bedside. No overnight events occurred. Patient slept with BIPAP overnight, VBG in the AM showed pH of 7.17. Patient seen at the time, mentating well and denies SOB worse than baseline. Repeat ABG showed pH of 7.38. Patient seen at bedside with Dr. So, states she would like to be made DNR/DNI.       MEDICATIONS  (STANDING):  apixaban 5 milliGRAM(s) Oral every 12 hours  aspirin  chewable 81 milliGRAM(s) Oral daily  atorvastatin 80 milliGRAM(s) Oral at bedtime  azithromycin   Tablet 500 milliGRAM(s) Oral daily  Biotene Dry Mouth Oral Rinse 5 milliLiter(s) Swish and Spit two times a day  budesonide 160 MICROgram(s)/formoterol 4.5 MICROgram(s) Inhaler 2 Puff(s) Inhalation two times a day  cholecalciferol 1000 Unit(s) Oral daily  dextrose 5%. 1000 milliLiter(s) (50 mL/Hr) IV Continuous <Continuous>  dextrose 50% Injectable 12.5 Gram(s) IV Push once  dextrose 50% Injectable 25 Gram(s) IV Push once  dextrose 50% Injectable 25 Gram(s) IV Push once  diltiazem    milliGRAM(s) Oral daily  fentaNYL   Patch  12 MICROgram(s)/Hr 1 Patch Transdermal every 72 hours  ferrous    sulfate 325 milliGRAM(s) Oral daily  guaiFENesin  milliGRAM(s) Oral every 12 hours  insulin glargine Injectable (LANTUS) 12 Unit(s) SubCutaneous at bedtime  insulin lispro (HumaLOG) corrective regimen sliding scale   SubCutaneous three times a day before meals  insulin lispro (HumaLOG) corrective regimen sliding scale   SubCutaneous at bedtime  insulin lispro Injectable (HumaLOG) 4 Unit(s) SubCutaneous three times a day before meals  ipratropium    for Nebulization 500 MICROGram(s) Nebulizer every 6 hours  montelukast 10 milliGRAM(s) Oral at bedtime  multivitamin 1 Tablet(s) Oral daily  pantoprazole    Tablet 40 milliGRAM(s) Oral before breakfast  polyethylene glycol 3350 17 Gram(s) Oral daily  predniSONE   Tablet 30 milliGRAM(s) Oral daily  senna 2 Tablet(s) Oral at bedtime  tiotropium 18 MICROgram(s) Capsule 1 Capsule(s) Inhalation daily    MEDICATIONS  (PRN):  acetaminophen   Tablet .. 650 milliGRAM(s) Oral every 6 hours PRN Mild Pain (1 - 3)  bisacodyl Suppository 10 milliGRAM(s) Rectal daily PRN Constipation  dextrose 40% Gel 15 Gram(s) Oral once PRN Blood Glucose LESS THAN 70 milliGRAM(s)/deciliter  glucagon  Injectable 1 milliGRAM(s) IntraMuscular once PRN Glucose LESS THAN 70 milligrams/deciliter  lactulose Syrup 15 Gram(s) Oral two times a day PRN constipation      Allergies    No Known Allergies    Intolerances    albuterol (Unknown)      REVIEW OF SYSTEMS:  CONSTITUTIONAL: No fever or chills, admits generalized weakness and fatigue  HEENT:  No headache, no sore throat  RESPIRATORY: +shortness of breath; no cough, wheezing  CARDIOVASCULAR: No chest pain, palpitations  GASTROINTESTINAL: No abd pain, nausea, vomiting, or diarrhea  GENITOURINARY: No dysuria, frequency, or hematuria  NEUROLOGICAL: no focal weakness or dizziness  MUSCULOSKELETAL: no myalgias     Vital Signs Last 24 Hrs  T(C): 36.8 (16 Oct 2020 05:50), Max: 36.9 (15 Oct 2020 21:20)  T(F): 98.2 (16 Oct 2020 05:50), Max: 98.4 (15 Oct 2020 21:20)  HR: 78 (16 Oct 2020 05:50) (73 - 78)  BP: 134/76 (16 Oct 2020 05:50) (122/62 - 135/75)  BP(mean): --  RR: 20 (16 Oct 2020 05:50) (19 - 20)  SpO2: 100% (16 Oct 2020 05:50) (96% - 100%)    PHYSICAL EXAM:  GENERAL: NAD, sitting upright in bed   HEENT:  anicteric, moist mucous membranes  CHEST/LUNG:  decreased breath sounds b/l, no rales, wheezes, or rhonchi  HEART:  RRR, S1, S2; no murmurs, rubs or gallops   ABDOMEN:  BS+, soft, nontender, nondistended  EXTREMITIES: no edema, cyanosis, or calf tenderness  NERVOUS SYSTEM: answers questions and follows commands appropriately      LABS:                        8.6    9.28  )-----------( 312      ( 16 Oct 2020 06:02 )             29.9     CBC Full  -  ( 16 Oct 2020 06:02 )  WBC Count : 9.28 K/uL  Hemoglobin : 8.6 g/dL  Hematocrit : 29.9 %  Platelet Count - Automated : 312 K/uL  Mean Cell Volume : 83.1 fl  Mean Cell Hemoglobin : 23.9 pg  Mean Cell Hemoglobin Concentration : 28.8 gm/dL  Auto Neutrophil # : 6.56 K/uL  Auto Lymphocyte # : 1.28 K/uL  Auto Monocyte # : 0.95 K/uL  Auto Eosinophil # : 0.33 K/uL  Auto Basophil # : 0.01 K/uL  Auto Neutrophil % : 70.7 %  Auto Lymphocyte % : 13.8 %  Auto Monocyte % : 10.2 %  Auto Eosinophil % : 3.6 %  Auto Basophil % : 0.1 %    16 Oct 2020 06:02    141    |  100    |  30     ----------------------------<  179    4.9     |  38     |  1.00     Ca    9.0        16 Oct 2020 06:02  Phos  3.3       15 Oct 2020 09:09  Mg     2.1       15 Oct 2020 09:09    TPro  5.9    /  Alb  2.4    /  TBili  0.2    /  DBili  x      /  AST  11     /  ALT  16     /  AlkPhos  61     16 Oct 2020 06:02        CAPILLARY BLOOD GLUCOSE      POCT Blood Glucose.: 197 mg/dL (16 Oct 2020 08:19)  POCT Blood Glucose.: 164 mg/dL (15 Oct 2020 21:43)  POCT Blood Glucose.: 189 mg/dL (15 Oct 2020 17:05)  POCT Blood Glucose.: 215 mg/dL (15 Oct 2020 11:53)        Culture - Sputum (collected 10-12-20 @ 16:44)  Source: .Sputum Sputum  Gram Stain (10-12-20 @ 19:22):    No polymorphonuclear leukocytes per low power field    Rare Squamous epithelial cells per low power field    Few Gram Negative Rods per oil power field    Rare Gram positive cocci in pairs per oil power field  Preliminary Report (10-14-20 @ 18:35):    Rare Mold like fungus    Normal Respiratory Samaria present        RADIOLOGY & ADDITIONAL TESTS:    Personally reviewed.     Consultant(s) Notes Reviewed:  [x] YES  [ ] NO

## 2020-10-16 NOTE — PROGRESS NOTE ADULT - SUBJECTIVE AND OBJECTIVE BOX
[facility] -- Adriana Gonzales, Gina, Funmilayo, Dylan Whitley Savella, Goodger  Office # [phone]    Follow Up:    shortness of breath  Subjective/Observations:   Patient seen and examined. She demonstrates frustration from having difficulty breathing. She is short of breath at rest. She is using BIPAP at night. She is receiving aerosolization at bedside.      REVIEW OF SYSTEMS: All other review of systems is negative unless indicated above  PAST MEDICAL & SURGICAL HISTORY:  Ascorbic acid deficiency  Iron deficiency anemia  Constipation  GERD without esophagitis  Type 2 diabetes mellitus  HTN (hypertension)  Heart failure  End stage COPD  on 3LNC  Atrial fibrillation  Hyperlipemia  Chronic sinusitis  Raynaud phenomenon  Claustrophobia  COPD (chronic obstructive pulmonary disease)  S/P tonsillectomy      MEDICATIONS  (STANDING):  apixaban 5 milliGRAM(s) Oral every 12 hours  aspirin  chewable 81 milliGRAM(s) Oral daily  atorvastatin 80 milliGRAM(s) Oral at bedtime  azithromycin   Tablet 500 milliGRAM(s) Oral daily  Biotene Dry Mouth Oral Rinse 5 milliLiter(s) Swish and Spit two times a day  budesonide 160 MICROgram(s)/formoterol 4.5 MICROgram(s) Inhaler 2 Puff(s) Inhalation two times a day  cholecalciferol 1000 Unit(s) Oral daily  dextrose 5%. 1000 milliLiter(s) (50 mL/Hr) IV Continuous <Continuous>  dextrose 50% Injectable 12.5 Gram(s) IV Push once  dextrose 50% Injectable 25 Gram(s) IV Push once  dextrose 50% Injectable 25 Gram(s) IV Push once  diltiazem    milliGRAM(s) Oral daily  fentaNYL   Patch  12 MICROgram(s)/Hr 1 Patch Transdermal every 72 hours  ferrous    sulfate 325 milliGRAM(s) Oral daily  guaiFENesin  milliGRAM(s) Oral every 12 hours  insulin glargine Injectable (LANTUS) 12 Unit(s) SubCutaneous at bedtime  insulin lispro (HumaLOG) corrective regimen sliding scale   SubCutaneous three times a day before meals  insulin lispro (HumaLOG) corrective regimen sliding scale   SubCutaneous at bedtime  insulin lispro Injectable (HumaLOG) 4 Unit(s) SubCutaneous three times a day before meals  ipratropium    for Nebulization 500 MICROGram(s) Nebulizer every 6 hours  montelukast 10 milliGRAM(s) Oral at bedtime  multivitamin 1 Tablet(s) Oral daily  pantoprazole    Tablet 40 milliGRAM(s) Oral before breakfast  polyethylene glycol 3350 17 Gram(s) Oral daily  predniSONE   Tablet 30 milliGRAM(s) Oral daily  senna 2 Tablet(s) Oral at bedtime  tiotropium 18 MICROgram(s) Capsule 1 Capsule(s) Inhalation daily    MEDICATIONS  (PRN):  acetaminophen   Tablet .. 650 milliGRAM(s) Oral every 6 hours PRN Mild Pain (1 - 3)  bisacodyl Suppository 10 milliGRAM(s) Rectal daily PRN Constipation  dextrose 40% Gel 15 Gram(s) Oral once PRN Blood Glucose LESS THAN 70 milliGRAM(s)/deciliter  glucagon  Injectable 1 milliGRAM(s) IntraMuscular once PRN Glucose LESS THAN 70 milligrams/deciliter  lactulose Syrup 15 Gram(s) Oral two times a day PRN constipation    Allergies    No Known Allergies    Intolerances    albuterol (Unknown)    Vital Signs Last 24 Hrs  T(C): 36.8 (16 Oct 2020 05:50), Max: 36.9 (15 Oct 2020 21:20)  T(F): 98.2 (16 Oct 2020 05:50), Max: 98.4 (15 Oct 2020 21:20)  HR: 77 (16 Oct 2020 08:17) (73 - 79)  BP: 134/76 (16 Oct 2020 05:50) (122/62 - 135/75)  BP(mean): --  RR: 20 (16 Oct 2020 05:50) (19 - 20)  SpO2: 98% (16 Oct 2020 08:17) (96% - 100%)  I&O's Summary    15 Oct 2020 07:01  -  16 Oct 2020 07:00  --------------------------------------------------------  IN: 480 mL / OUT: 400 mL / NET: 80 mL        PHYSICAL EXAM:  TELE: normal sinus  Constitutional: morbidly obese, NAD, awake and alert, well-developed  HEENT: Moist Mucous Membranes, Anicteric  Pulmonary: breathing is labored and tachypnic, decreased breath sounds  Cardiovascular: Regular, S1 and S2, No murmurs, rubs, gallops or clicks  Gastrointestinal: Bowel Sounds present, soft, nontender.   Lymph: No peripheral edema. No lymphadenopathy.  Skin: No visible rashes or ulcers.  Psych:  Mood & affect appropriate  LABS: All Labs Reviewed:                        8.6    9.28  )-----------( 312      ( 16 Oct 2020 06:02 )             29.9                         9.4    15.05 )-----------( 291      ( 15 Oct 2020 09:09 )             31.7                         10.1   12.11 )-----------( 344      ( 14 Oct 2020 08:35 )             34.6     16 Oct 2020 06:02    141    |  100    |  30     ----------------------------<  179    4.9     |  38     |  1.00   15 Oct 2020 09:09    140    |  101    |  28     ----------------------------<  140    4.8     |  34     |  0.98   14 Oct 2020 08:35    139    |  102    |  25     ----------------------------<  141    4.8     |  31     |  0.87     Ca    9.0        16 Oct 2020 06:02  Ca    8.9        15 Oct 2020 09:09  Ca    9.1        14 Oct 2020 08:35  Phos  3.3       15 Oct 2020 09:09  Phos  3.5       14 Oct 2020 08:35  Mg     2.1       15 Oct 2020 09:09  Mg     2.2       14 Oct 2020 08:35    TPro  5.9    /  Alb  2.4    /  TBili  0.2    /  DBili  x      /  AST  11     /  ALT  16     /  AlkPhos  61     16 Oct 2020 06:02      < from: 12 Lead ECG (10.08.20 @ 08:51) >    Ventricular Rate 109 BPM    Atrial Rate 109 BPM    P-R Interval 150 ms    QRS Duration 136 ms    Q-T Interval 390 ms    QTC Calculation(Bazett) 525 ms    P Axis 56 degrees    R Axis -16 degrees    T Axis 57 degrees    Diagnosis Line Sinus tachycardia with premature supraventricular complexes  Right bundle branch block  Abnormal ECG  Confirmed by LUIS ENRIQUE WHITLEY ([id number]) on 10/8/2020 11:41:04 AM    < end of copied text >    <12 Lead ECG:   Ventricular Rate 109 BPM  Atrial Rate 109 BPM  P-R Interval 150 ms  QRS Duration 136 ms  Q-T Interval 390 ms  QTC Calculation(Bazett) 525 ms  P Axis 56 degrees  R Axis -16 degrees  T Axis 57 degrees  Diagnosis Line Sinus tachycardia with premature supraventricular complexes  Right bundle branch block  Abnormal ECG  Confirmed by LUIS ENRIQUE WHITLEY ([id number]) on 10/8/2020 11:41:04 AM (10-08-20 @ 08:51)    < from: TTE Echo Complete w/o Contrast w/ Doppler (10.06.20 @ 17:10) >  Dimensions:  LA 3.7       Normal Values: 2.0 - 4.0 cm  Ao 2.7        Normal Values: 2.0 - 3.8 cm  SEPTUM 1.4       Normal Values: 0.6 - 1.2 cm  PWT 1.3       Normal Values: 0.6 - 1.1 cm  LVIDd 3.9         Normal Values: 3.0 - 5.6 cm  LVIDs 2.8         Normal Values: 1.8 - 4.0 cm    OBSERVATIONS:  Technically difficult and limited study  Mitral Valve: Thickened leaflets, mild MR.  Aortic Valve/Aorta: Aortic valve is not well-visualized, grossly normal function without severe pathology  Tricuspid Valve: normal with trace TR.  Pulmonic Valve: Not well-visualized  Left Atrium: normal  Right Atrium: Not well-visualized  Left Ventricle: normal LV size and systolic function, estimated LVEF of 55%. Mild LVH. Endocardium is not well-visualized, cannot rule out segmental dysfunction  Right Ventricle: Grossly normal size and systolic function.  Pericardium/Pleura: normal, no significant pericardial effusion.  Pulmonary/RV Pressure: estimated PA systolic pressure of 15.7 mmHg assuming an RA pressure of 3 mmHg.  LV diastolic dysfunction is present    Conclusion:  Technically difficult and limited study  Mild concentric LVH with grossly normal left ventricular systolic function, estimated LVEF of 55%.  Grossly normal RV size and systolic function.  Aortic valve is not well-visualized, grossly normal function without severe pathology  Mild MR  Trace TR.  No significant pericardial effusion.    < end of copied text >    < from: Cardiac Cath Lab - Adult (03.24.17 @ 11:16) >  VENTRICLES: Global left ventricular function was normal. EF estimated was  65 %.  CORONARY VESSELS: The coronary circulation is right dominant.  LM:   --  Mid left main: There was a 30 % stenosis.  LAD:   --  Ostial LAD: There was a 40 % stenosis.  CX:   --  Circumflex: Normal.  RCA:   --  RCA: Normal.  COMPLICATIONS: There were no complications.  DIAGNOSTIC RECOMMENDATIONS: The patient should continue with the present  medications.  < end of copied text >

## 2020-10-16 NOTE — CONSULT NOTE ADULT - CONSULT REASON
AURORA on CKD
COPD  Pneumonia
GOC
MGUS
Pneumonia
copd ex  end stage COPD  multifocal PNA  anxiety
afib

## 2020-10-16 NOTE — PROGRESS NOTE ADULT - SUBJECTIVE AND OBJECTIVE BOX
Patient is a 60y old  Female who presents with a chief complaint of COPD exacerbation, PNA (08 Oct 2020 11:37)       HPI:  60F w/ end stage COPD on 3LNC (pending transplant list at [facility]), former smoker, CAD s/p stent (2016), afib on eliquis, uncontrolled DM2 (on insulin), raynaud phenomenon and recent hospitalization at [facility] for confusion and AURORA p/w sob[facility] Patient states that she has had worsening shortness of breath for past two days. Unable to obtain comprehensive history due to dyspnea. Patient denies fever, chills, sore throat, cough, chest pain, palpitations, abd pain, n/v. Currently feels short of breath even after receiving duonebs and steroids in the ED. Unable to keep mask on during interview and lay back in stretcher due to subjective sob. Patient repeatedly stating that it is too hot in her room and fanning herself with a paper. Per chart, Dr. Mathew (PCP) has chart notes regarding possible hallucinations. Would like her home medications now but otherwise has no acute complaints.  Has not seen nephrologist.  Denies any N/V.  SOB improving.  States she is urinating well.  Denies any urinary retention.  Denies any supplement or NSAID usage.         No new complaints  Still has SOB and NIELSON    PAST MEDICAL & SURGICAL HISTORY:  H/O Raynaud&#x27;s syndrome    Ascorbic acid deficiency    Iron deficiency anemia    Constipation    GERD without esophagitis    Type 2 diabetes mellitus    HTN (hypertension)    Heart failure    HLD (hyperlipidemia)    History of chronic atrial fibrillation  on Eliquis    End stage COPD  on 3LNC    Atrial fibrillation    Hyperlipemia    Chronic sinusitis    Raynaud phenomenon    HTN (hypertension)    DM (diabetes mellitus)    Claustrophobia    COPD (chronic obstructive pulmonary disease)    History of tonsillectomy    S/P tonsillectomy         FAMILY HISTORY:  FH: myocardial infarction    Family history of CABG    FH: MI (myocardial infarction)    FH: CABG (coronary artery bypass surgery)    NC    Social History:Non smoker    MEDICATIONS  (STANDING):  apixaban 5 milliGRAM(s) Oral every 12 hours  ascorbic acid 500 milliGRAM(s) Oral daily  aspirin  chewable 81 milliGRAM(s) Oral daily  atorvastatin 80 milliGRAM(s) Oral at bedtime  azithromycin  IVPB 500 milliGRAM(s) IV Intermittent every 24 hours  budesonide 160 MICROgram(s)/formoterol 4.5 MICROgram(s) Inhaler 2 Puff(s) Inhalation two times a day  cefepime   IVPB 2000 milliGRAM(s) IV Intermittent every 12 hours  cholecalciferol 1000 Unit(s) Oral daily  dextrose 5%. 1000 milliLiter(s) (50 mL/Hr) IV Continuous <Continuous>  dextrose 50% Injectable 12.5 Gram(s) IV Push once  dextrose 50% Injectable 25 Gram(s) IV Push once  dextrose 50% Injectable 25 Gram(s) IV Push once  ferrous    sulfate 325 milliGRAM(s) Oral daily  insulin glargine Injectable (LANTUS) 10 Unit(s) SubCutaneous at bedtime  insulin lispro (HumaLOG) corrective regimen sliding scale   SubCutaneous three times a day before meals  insulin lispro Injectable (HumaLOG) 3 Unit(s) SubCutaneous three times a day before meals  montelukast 10 milliGRAM(s) Oral at bedtime  multivitamin 1 Tablet(s) Oral daily  pantoprazole    Tablet 40 milliGRAM(s) Oral before breakfast  polyethylene glycol 3350 17 Gram(s) Oral daily  potassium chloride    Tablet ER 40 milliEquivalent(s) Oral every 4 hours  senna 2 Tablet(s) Oral at bedtime  tiotropium 18 MICROgram(s) Capsule 1 Capsule(s) Inhalation daily    MEDICATIONS  (PRN):  acetaminophen   Tablet .. 650 milliGRAM(s) Oral every 6 hours PRN Mild Pain (1 - 3)  bisacodyl Suppository 10 milliGRAM(s) Rectal daily PRN Constipation  dextrose 40% Gel 15 Gram(s) Oral once PRN Blood Glucose LESS THAN 70 milliGRAM(s)/deciliter  glucagon  Injectable 1 milliGRAM(s) IntraMuscular once PRN Glucose LESS THAN 70 milligrams/deciliter  guaiFENesin   Syrup  (Sugar-Free) 100 milliGRAM(s) Oral every 6 hours PRN Cough  lactulose Syrup 15 Gram(s) Oral two times a day PRN constipation  levalbuterol Inhalation 0.63 milliGRAM(s) Inhalation every 4 hours PRN shortness of breath and/or wheezing  oxyCODONE    IR 5 milliGRAM(s) Oral every 6 hours PRN Moderate Pain (4 - 6)   Meds reviewed    Allergies    No Known Allergies    Intolerances    albuterol (Unknown)       REVIEW OF SYSTEMS:    CONSTITUTIONAL:  No weight loss   EYES: No eye pain, visual disturbances, or discharge  ENMT:  No difficulty hearing, tinnitus, vertigo; No sinus or throat pain  NECK: No pain or stiffness  BREASTS: No pain, masses, or nipple discharge  RESPIRATORY: +SOB. +NIELSON  CARDIOVASCULAR: No chest pain, palpitations, dizziness,   GASTROINTESTINAL: No abdominal or epigastric pain. No nausea, vomiting, or hematemesis;  GENITOURINARY: No dysuria, frequency, hematuria, or incontinence  NEUROLOGICAL: No headaches, memory loss, loss of strength, numbness, or tremors  SKIN: no Diffuse erythema, no blisters  LYMPH NODES: No enlarged glands  ENDOCRINE: No heat or cold intolerance; No hair loss  MUSCULOSKELETAL: No joint pain or swelling   PSYCHIATRIC: No depression, anxiety, mood swings, or difficulty sleeping  ALLERGY AND IMMUNOLOGIC: No hives or eczema      ICU Vital Signs Last 24 Hrs  T(C): 36.8 (16 Oct 2020 05:50), Max: 36.9 (15 Oct 2020 21:20)  T(F): 98.2 (16 Oct 2020 05:50), Max: 98.4 (15 Oct 2020 21:20)  HR: 77 (16 Oct 2020 08:17) (73 - 79)  BP: 134/76 (16 Oct 2020 05:50) (122/62 - 135/75)  BP(mean): --  ABP: --  ABP(mean): --  RR: 20 (16 Oct 2020 05:50) (19 - 20)  SpO2: 98% (16 Oct 2020 08:17) (96% - 100%)            PHYSICAL EXAM:    GENERAL: No distress  HEAD:  Atraumatic, Normocephalic  EYES: EOMI, conjunctiva and sclera clear  ENMT: No drainage from nares, no drainage from pinna  NECK: Supple, neck  veins full  NERVOUS SYSTEM:  Alert & Oriented X3, Good concentration; Motor Strength wnl upper and lower extremities  CHEST/LUNG: Decreased BS,no rales, no rhonchi, Mild wheezing  HEART: Regular rate and rhythm; No murmurs, rubs, or gallops  ABDOMEN: Soft, Nontender, Nondistended; Bowel sounds present,  EXTREMITIES: trace Edema  SKIN: No rashes or lesions, pale      LABS:                                              8.6    9.28  )-----------( 312      ( 16 Oct 2020 06:02 )             29.9     10-16    141  |  100  |  30<H>  ----------------------------<  179<H>  4.9   |  38<H>  |  1.00    Ca    9.0      16 Oct 2020 06:02  Phos  3.3     10-15  Mg     2.1     10-15    TPro  5.9<L>  /  Alb  2.4<L>  /  TBili  0.2  /  DBili  x   /  AST  11<L>  /  ALT  16  /  AlkPhos  61  10-16          ABG - ( 16 Oct 2020 11:26 )  pH, Arterial: 7.38  pH, Blood: x     /  pCO2: 57    /  pO2: 70    / HCO3: 30    / Base Excess: 7.3   /  SaO2: 93

## 2020-10-16 NOTE — PROGRESS NOTE ADULT - ASSESSMENT
60F w/ end stage COPD on 3LNC (pending transplant list at [facility]), former smoker, CAD s/p stent (2016), Afib on Eliquis,  uncontrolled DM2 (on insulin), raynaud phenomenon and recent hospitalization at [facility] for confusion and AURORA p/w sob, admitted for COPD exacerbation and multifocal PNA    CAD s/p stent   - Continue asa, statin  - Denies ischemic symptoms   - EKG showed SR @ 109, RBBB that is chronic    Paroxysmal Afib  -NSR on tele  - Continue Eliquis 5mg  - Continue Cardizem CD at 240mg   - Monitor electrolytes, replete to keep K>4 and Mag>2  - TTE w/ mild concentric LVH grossly nL LVSF ef 55%, mild MR    End stage COPD/Pneumonia  - Pulm following.  Per note, patient is on waiting list for transplant  - CT chest noted  - Continue steroids and antibiotics  - Continue NC and BIPAP/CPAP prn/nocturnally  - Continue incentive spirometer; please reinforce     VTE ppx  - On Eliquis    - Monitor and replete lytes, keep K>4, Mg>2.  - All other medical needs as per primary team.  - Other cardiovascular workup will depend on clinical course.  - Will continue to follow.

## 2020-10-16 NOTE — PROGRESS NOTE ADULT - PROBLEM SELECTOR PLAN 9
on oxycodone and tylenol PRN at rehab for chronic pain, continue  -apprec palliative care collaboration - Fentanyl patch on oxycodone and tylenol PRN at rehab for chronic pain, continue  -apprec palliative care collaboration - Fentanyl patch and Xanax 0.25mg q8 PRN

## 2020-10-16 NOTE — PROGRESS NOTE ADULT - ASSESSMENT
60F w/ end stage COPD on 3LNC (trying to get on  transplant list at [facility]), former smoker, CAD s/p stent (2016), afib on eliquis, uncontrolled DM2 (on insulin), raynaud phenomenon and recent hospitalization at [facility] for confusion and AURORA p/w sob, admitted for acute on chronic resp failure with hypoxia and hyercarbia sec to multifocal PNA and COPD exacerbation with end stage COPD.     1) Acute on chronic respiratory failure with hypoxia and hypercapnia.  Plan: likely multifactorial from multifocal PNA and COPD exacerbation/End stage COPD/stage 1 diastolic dysfunction CHF (EF 55%)  2) Failure to thrive  3) Goals of care/advance care planning  - Palliative performance scale score: 40% (poor)  - Prognosis: days-weeks (pt is hospice eligible)   - Code status: DNR/DNI (MOLST filled out & placed in the chart)   - GOC:  continue antibiotics and aggressive medical management. Continue Bipap. Pt would like to pursue lung trabsplant however slowly is dawning on her that likelihood is likely non-existing.    - HCP: pt's brothers Michele 1ry and Alfonso 2ry  - Psychosocial support provided    4) Dyspnea - multifactorial  5) Anxiety - now exacerbated by worsening dyspnea  - continue fentanyl patch 12 mcg q72h   - continue oxycodone prn  - could benefit from starting SSRI (will discuss with pt)  - bipap & O2 as per primary team    Discussed with the primary team. Will continue to follow.     Suad So MD

## 2020-10-16 NOTE — CHART NOTE - NSCHARTNOTEFT_GEN_A_CORE
Called by RN for Pt complaining of LE swelling. Pt seen and examined at bedside. Pt admits to LE swelling and says that this happened to her during her last hospitalization at [facility]. She said at that time she was given something to take off the fluid, after chart review it seems she has LE swelling complicated by cellulitis. Pt also says that when she notices any swelling she elevates her legs at home it helps. Pt denies chest pain, calf pain, abdominal pain, headache, or dizziness.     T(C): 36.4 (10-16-20 @ 20:39), Max: 36.8 (10-16-20 @ 05:50)  HR: 75 (10-16-20 @ 20:39) (73 - 79)  BP: 153/77 (10-16-20 @ 20:39) (114/73 - 153/77)  RR: 22 (10-16-20 @ 20:39) (20 - 22)  SpO2: 95% (10-16-20 @ 20:39) (95% - 100%)    Physical :  Gen- NAD, ncat  Cardio - s+1,s+2, rrr, no murmur  Lung - diminished breath sounds bilaterally, no wheeze, no rhonchi   Abdomen- +BS, NT/ND, no guarding, no rebound, no masses  Ext- +2 pitting edema in b/l thigh and feet, LEs cold to touch, no calf tenderness  Neuro- alert and oriented x4, moving all 4 extremities    LABS:                        8.6    9.28  )-----------( 312      ( 16 Oct 2020 06:02 )             29.9     10-16    141  |  100  |  30<H>  ----------------------------<  179<H>  4.9   |  38<H>  |  1.00    Ca    9.0      16 Oct 2020 06:02  Phos  3.3     10-15  Mg     2.1     10-15    TPro  5.9<L>  /  Alb  2.4<L>  /  TBili  0.2  /  DBili  x   /  AST  11<L>  /  ALT  16  /  AlkPhos  61  10-16        ABG - ( 16 Oct 2020 11:26 )  pH, Arterial: 7.38  pH, Blood: x     /  pCO2: 57    /  pO2: 70    / HCO3: 30    / Base Excess: 7.3   /  SaO2: 93          Assessment/Plan  60F w/ end stage COPD on 3LNC (trying to get on  transplant list at [facility]), former smoker, CAD s/p stent (2016), afib on eliquis, DM2 (on insulin), raynaud phenomenon and recent hospitalization at [facility] for confusion and AURORA p/w sob, admitted for acute on chronic resp failure with hypoxia and hyercarbia sec to multifocal PNA and COPD exacerabtion with end stage COPD. Pt now with LE swelling.    - will give bumex 1mg PO and continue to monitor   - No LE dopplers or d-dimer at this time as swelling likely 2/2 to overload  - Discussed with house doc  - RN to call if any changes    Dr. Leigh  PGY1  [id number]

## 2020-10-16 NOTE — PROGRESS NOTE ADULT - ASSESSMENT
cont rx   cont rx      MINNIE ALONZO  DOA 10/6/2020 DR JASON WALKER            REVIEW OF SYMPTOMS      Able to give ROS  Yes     RELIABLE No   CONSTITUTIONAL Weakness Yes  Chills No Vision changes No  ENDOCRINE No unexplained hair loss No heat or cold intolerance    ALLERGY No hives  Sore throat No   RESP Coughing blood no  Shortness of breath YES   NEURO No Headache  Confusion Pain neck No   CARDIAC No Chest pain No Palpitations   GI No Pain abdomen NO   Vomiting NO     PHYSICAL EXAM    HEENT Unremarkable PERRLA atraumatic   RESP Fair air entry EXP prolonged    Harsh breath sound Resp distres mild   CARDIAC S1 S2 No S3     NO JVD    ABDOMEN SOFT BS PRESENT NOT DISTENDED No hepatosplenomegaly PEDAL EDEMA present No calf tenderness  NO rash   GENERAL Not TOXIC looking    PT DATA/BEST PRACTICE  ALLERGY        albuterol      WT                  10/6/2020 76              BMI                   10/6/2020 28                       ADVANCED DIRECTIVE     HEAD OF BED ELEVATION Yes      DVT PROPHYLAXIS.     Apixaban 5.2 (10/5)  (a f)   DIET. dash cons carb (10/9)                                                                          FUNEZ PROPHYLAXIS. protonix 40 (10/6)   INFECTION PPLX                                                    OXYGENATION.   10/15-10/16/2020 5l 96%- 5l 100%       VITALS.   10/16/2020 afeb 130/70 20       LABS.   W 10/15-10/16/2020 w 15- 9.2    pr 10/16/2020 pr negv   CO2 10/15/2020 CO2 34   BNP 10/16/2020 bnp 1363   Hb 10/15-10/16/2020 Hb 9.4- 8.6    Na 10/15/2020 Na 140   Cr 10/15/2020 Cr .9            PATIENT SUMMARY.   60F w/ end stage COPD on 3LNC (trying to get on  transplant list at [facility]), former smoker, CAD s/p stent (2016), afib on eliquis, uncontrolled DM2 (on insulin), raynaud phenomenon and recent hospitalization at [facility] for confusion and AURORA p/w sob, admitted for acute on chronic resp failure with hypoxia and hyercarbia sec to multifocal PNA and COPD exacerbation with end stage COPD.   Dr Rojas asked me to cover pulm 10/15-10/18/2020     PROBLEM/ASSESSMENT/RECOMMENDATIONS.  GAS EXCHANGE Pt has ho hypercapnic resp failure Avoid excess oxygen Keep po 90-95% range  PNEUMONIA Is on azithro (rocephindced 10/16/2020)  As sputum showed fungus will check galactomannan and ige to look for abpa   COPD On bs steroids Is intoleratnt to albuterol but is on laba lama ics and po steroids Cont rx  CHF ECHO 10/6/2020 echo ef 55% pasp 15 dd   May have element of d chf No PH   VTE Unlikely as sshe is on doac  A fib On apixaban cardizem Rate controlled anticoagulated cont rx  ANEMIA Likely acd Monitor for drop in Hb Target Hb 7 (+)    TIME SPENT   Over 25 minutes aggregate care time spent on encounter; activities included   direct patient care, counseling and/or coordinating care reviewing notes, lab data/ imaging , discussion with multidisciplinary team/ patient  /family and explaining in detail risks, benefits, alternatives  of the recommendations     MINNIE ALONZO  DOA 10/6/2020 DR JASON WALKER

## 2020-10-16 NOTE — PROGRESS NOTE ADULT - SUBJECTIVE AND OBJECTIVE BOX
[facility]  INFECTIOUS DISEASES   =======================================================    [facility]-[id number]  CHERI HARTMAN     Follow up: COPD exacerbation, Pneumonia    No new changes or overnight event.   No fever. Feels the same.     PAST MEDICAL & SURGICAL HISTORY:  H/O Raynaud&#x27;s syndrome  Ascorbic acid deficiency  Iron deficiency anemia  Constipation  GERD without esophagitis  Type 2 diabetes mellitus  HTN (hypertension)  Heart failure  HLD (hyperlipidemia)  History of chronic atrial fibrillation  on Eliquis  End stage COPD  on 3LNC  History of tonsillectomy    Social Hx: former heavy smoker, no ETOH or drugs     FAMILY HISTORY:  FH: myocardial infarction    Family history of CABG    Allergies  No Known Allergies    Antibiotics:  Azithromycin   Ceftriaxone     REVIEW OF SYSTEMS:  CONSTITUTIONAL:  No Fever or chills  HEENT:  No diplopia or blurred vision.  No sore throat or runny nose.  CARDIOVASCULAR:  No chest pain or SOB.  RESPIRATORY:  +cough, severe SOB  GASTROINTESTINAL:  No nausea, vomiting or diarrhea.  GENITOURINARY:  No dysuria, frequency or urgency. No Blood in urine  MUSCULOSKELETAL:  no joint aches, no muscle pain  SKIN:  No change in skin, hair or nails.  NEUROLOGIC:  No paresthesias, fasciculations, seizures or weakness.  PSYCHIATRIC:  No disorder of thought or mood.  ENDOCRINE:  No heat or cold intolerance, polyuria or polydipsia.  HEMATOLOGICAL:  No easy bruising or bleeding.     Physical Exam:  Vital Signs Last 24 Hrs  T(C): 36.8 (16 Oct 2020 12:19), Max: 36.9 (15 Oct 2020 21:20)  T(F): 98.2 (16 Oct 2020 12:19), Max: 98.4 (15 Oct 2020 21:20)  HR: 73 (16 Oct 2020 12:19) (73 - 79)  BP: 114/73 (16 Oct 2020 12:19) (114/73 - 135/75)  BP(mean): --  RR: 20 (16 Oct 2020 12:19) (19 - 20)  SpO2: 96% (16 Oct 2020 12:19) (96% - 100%)  GEN: NAD  HEENT: normocephalic and atraumatic. EOMI. PERRL.    NECK: Supple.  No lymphadenopathy   LUNGS: very poor air movement but Clear to auscultation with no wheezing or rales .  HEART: Regular rate and rhythm   ABDOMEN: Soft, nontender, and nondistended.  Positive bowel sounds.    : No CVA tenderness  EXTREMITIES: Without any cyanosis, clubbing, rash, lesions or edema.  NEUROLOGIC: grossly intact.  PSYCHIATRIC: Appropriate affect .  SKIN: No ulceration or induration present.    Labs:                        8.6    9.28  )-----------( 312      ( 16 Oct 2020 06:02 )             29.9      10-16    141  |  100  |  30<H>  ----------------------------<  179<H>  4.9   |  38<H>  |  1.00    Ca    9.0      16 Oct 2020 06:02  Phos  3.3     10-15  Mg     2.1     10-15    TPro  5.9<L>  /  Alb  2.4<L>  /  TBili  0.2  /  DBili  x   /  AST  11<L>  /  ALT  16  /  AlkPhos  61  10-16    Culture - Sputum (collected 10-12-20 @ 16:44)  Source: .Sputum Sputum  Gram Stain (10-12-20 @ 19:22):    No polymorphonuclear leukocytes per low power field    Rare Squamous epithelial cells per low power field    Few Gram Negative Rods per oil power field    Rare Gram positive cocci in pairs per oil power field    Culture - Sputum (collected 10-09-20 @ 06:28)  Source: .Sputum Sputum  Gram Stain (10-09-20 @ 13:47):    No polymorphonuclear leukocytes per low power field    Few Squamous epithelial cells per low power field    Few Gram Positive Cocci in Clusters per oil power field  Final Report (10-11-20 @ 08:47):    Normal Respiratory Richie present    Culture - Blood (collected 10-06-20 @ 09:23)  Source: .Blood Blood  Final Report (10-11-20 @ 10:00):    No Growth Final    Culture - Blood (collected 10-06-20 @ 09:23)  Source: .Blood Blood  Final Report (10-11-20 @ 10:00):    No Growth Final    WBC Count: 9.28 K/uL (10-16-20 @ 06:02)  WBC Count: 15.05 K/uL (10-15-20 @ 09:09)  WBC Count: 12.11 K/uL (10-14-20 @ 08:35)  WBC Count: 16.27 K/uL (10-13-20 @ 08:43)  WBC Count: 12.44 K/uL (10-12-20 @ 08:50)    Creatinine, Serum: 1.00 mg/dL (10-16-20 @ 06:02)  Creatinine, Serum: 0.98 mg/dL (10-15-20 @ 09:09)  Creatinine, Serum: 0.87 mg/dL (10-14-20 @ 08:35)  Creatinine, Serum: 0.93 mg/dL (10-13-20 @ 08:43)  Creatinine, Serum: 0.94 mg/dL (10-12-20 @ 08:50)    C-Reactive Protein, Serum: 26.32 mg/dL (10-06-20 @ 09:01)    Ferritin, Serum: 96 ng/mL (10-09-20 @ 12:19)    Procalcitonin, Serum: <0.05 ng/mL (10-16-20 @ 06:02)  Procalcitonin, Serum: 0.05 ng/mL (10-10-20 @ 06:48)     COVID-19 PCR: NotDetec (10-06-20 @ 03:23)    Imaging:  < from: Xray Chest 1 View-PORTABLE IMMEDIATE (Xray Chest 1 View-PORTABLE IMMEDIATE .) (10.10.20 @ 09:54) >  EXAM:  XR CHEST PORTABLE IMMED 1V                        PROCEDURE DATE:  10/10/2020    INTERPRETATION:  INDICATION: Pneumonia; follow-up assessment.  PRIORS: 10/8/2020.  VIEWS: Portable AP radiography of the chest performed.  FINDINGS: Heart size appears within normal limits. No hilar or superior mediastinal abnormalities are identified. Infiltrates within the bilateral mid to lower lung fields are without significant change. No pleural effusion or pneumothorax is demonstrated.No mediastinal shift is noted. The visualized osseous structures appear unremarkable.  IMPRESSION: No significant interval change.    Assessment and Plan:   61y/o woman with end stage COPD on 3L O2 at home awaiting transplant at [facility], former smoker, CAD s/p stent, afib, DM2), raynaud phenomenon and recent hospitalization at [facility] for confusion and [facility]A has been sent from [facility] with worsening of SOB. CT with multilobar opacities. Due to recent hospitalization and rehab stay, will cover for possible HCAP. Very high risk with a poor lung capacities.   10/16 s/p 10days of ceftriaxone and now on azithromycin daily, sputum showed rate mold, most likely not an invasive fungal infection, ABPA is a possibility, Eos% not high.    Legionella U ag negative   TTE, result noted, EF=55%,  CXR and CT with multilobar opacities    COPD, Pneumonia  - Blood culture NGTD  - First Sputum culture with normal respiratory richie and 2nd one with rare mold like fungus, will follow the result   - Galactomannan testing  - Pulmonary consult noted  - Azithromycin given by pulmonary for antiinflammatory effect and prophylaxis.   - Will hold on antifungal treatment for now     Will follow.     Wagner Juarez MD  Division of Infectious Diseases   Cell [phone] between 7am and 5pm   After 5pm and weekends please call ID service at [phone].    [facility]  INFECTIOUS DISEASES   =======================================================    [facility]-[id number]  CHERI HARTMAN     Follow up: COPD exacerbation, Pneumonia    No new changes or overnight event.   No fever. Feels the same.     PAST MEDICAL & SURGICAL HISTORY:  H/O Raynaud&#x27;s syndrome  Ascorbic acid deficiency  Iron deficiency anemia  Constipation  GERD without esophagitis  Type 2 diabetes mellitus  HTN (hypertension)  Heart failure  HLD (hyperlipidemia)  History of chronic atrial fibrillation  on Eliquis  End stage COPD  on 3LNC  History of tonsillectomy    Social Hx: former heavy smoker, no ETOH or drugs     FAMILY HISTORY:  FH: myocardial infarction    Family history of CABG    Allergies  No Known Allergies    Antibiotics:  Azithromycin   Ceftriaxone     REVIEW OF SYSTEMS:  CONSTITUTIONAL:  No Fever or chills  HEENT:  No diplopia or blurred vision.  No sore throat or runny nose.  CARDIOVASCULAR:  No chest pain or SOB.  RESPIRATORY:  +cough, severe SOB  GASTROINTESTINAL:  No nausea, vomiting or diarrhea.  GENITOURINARY:  No dysuria, frequency or urgency. No Blood in urine  MUSCULOSKELETAL:  no joint aches, no muscle pain  SKIN:  No change in skin, hair or nails.  NEUROLOGIC:  No paresthesias, fasciculations, seizures or weakness.  PSYCHIATRIC:  No disorder of thought or mood.  ENDOCRINE:  No heat or cold intolerance, polyuria or polydipsia.  HEMATOLOGICAL:  No easy bruising or bleeding.     Physical Exam:  Vital Signs Last 24 Hrs  T(C): 36.8 (16 Oct 2020 12:19), Max: 36.9 (15 Oct 2020 21:20)  T(F): 98.2 (16 Oct 2020 12:19), Max: 98.4 (15 Oct 2020 21:20)  HR: 73 (16 Oct 2020 12:19) (73 - 79)  BP: 114/73 (16 Oct 2020 12:19) (114/73 - 135/75)  BP(mean): --  RR: 20 (16 Oct 2020 12:19) (19 - 20)  SpO2: 96% (16 Oct 2020 12:19) (96% - 100%)  GEN: NAD  HEENT: normocephalic and atraumatic. EOMI. PERRL.    NECK: Supple.  No lymphadenopathy   LUNGS: very poor air movement but Clear to auscultation with no wheezing or rales .  HEART: Regular rate and rhythm   ABDOMEN: Soft, nontender, and nondistended.  Positive bowel sounds.    : No CVA tenderness  EXTREMITIES: Without any cyanosis, clubbing, rash, lesions or edema.  NEUROLOGIC: grossly intact.  PSYCHIATRIC: Appropriate affect .  SKIN: No ulceration or induration present.    Labs:                        8.6    9.28  )-----------( 312      ( 16 Oct 2020 06:02 )             29.9      10-16    141  |  100  |  30<H>  ----------------------------<  179<H>  4.9   |  38<H>  |  1.00    Ca    9.0      16 Oct 2020 06:02  Phos  3.3     10-15  Mg     2.1     10-15    TPro  5.9<L>  /  Alb  2.4<L>  /  TBili  0.2  /  DBili  x   /  AST  11<L>  /  ALT  16  /  AlkPhos  61  10-16    Culture - Sputum (collected 10-12-20 @ 16:44)  Source: .Sputum Sputum  Gram Stain (10-12-20 @ 19:22):    No polymorphonuclear leukocytes per low power field    Rare Squamous epithelial cells per low power field    Few Gram Negative Rods per oil power field    Rare Gram positive cocci in pairs per oil power field    Culture - Sputum (collected 10-09-20 @ 06:28)  Source: .Sputum Sputum  Gram Stain (10-09-20 @ 13:47):    No polymorphonuclear leukocytes per low power field    Few Squamous epithelial cells per low power field    Few Gram Positive Cocci in Clusters per oil power field  Final Report (10-11-20 @ 08:47):    Normal Respiratory Richie present    Culture - Blood (collected 10-06-20 @ 09:23)  Source: .Blood Blood  Final Report (10-11-20 @ 10:00):    No Growth Final    Culture - Blood (collected 10-06-20 @ 09:23)  Source: .Blood Blood  Final Report (10-11-20 @ 10:00):    No Growth Final    WBC Count: 9.28 K/uL (10-16-20 @ 06:02)  WBC Count: 15.05 K/uL (10-15-20 @ 09:09)  WBC Count: 12.11 K/uL (10-14-20 @ 08:35)  WBC Count: 16.27 K/uL (10-13-20 @ 08:43)  WBC Count: 12.44 K/uL (10-12-20 @ 08:50)    Creatinine, Serum: 1.00 mg/dL (10-16-20 @ 06:02)  Creatinine, Serum: 0.98 mg/dL (10-15-20 @ 09:09)  Creatinine, Serum: 0.87 mg/dL (10-14-20 @ 08:35)  Creatinine, Serum: 0.93 mg/dL (10-13-20 @ 08:43)  Creatinine, Serum: 0.94 mg/dL (10-12-20 @ 08:50)    C-Reactive Protein, Serum: 26.32 mg/dL (10-06-20 @ 09:01)    Ferritin, Serum: 96 ng/mL (10-09-20 @ 12:19)    Procalcitonin, Serum: <0.05 ng/mL (10-16-20 @ 06:02)  Procalcitonin, Serum: 0.05 ng/mL (10-10-20 @ 06:48)     COVID-19 PCR: NotDetec (10-06-20 @ 03:23)    Imaging:  < from: Xray Chest 1 View-PORTABLE IMMEDIATE (Xray Chest 1 View-PORTABLE IMMEDIATE .) (10.10.20 @ 09:54) >  EXAM:  XR CHEST PORTABLE IMMED 1V                        PROCEDURE DATE:  10/10/2020    INTERPRETATION:  INDICATION: Pneumonia; follow-up assessment.  PRIORS: 10/8/2020.  VIEWS: Portable AP radiography of the chest performed.  FINDINGS: Heart size appears within normal limits. No hilar or superior mediastinal abnormalities are identified. Infiltrates within the bilateral mid to lower lung fields are without significant change. No pleural effusion or pneumothorax is demonstrated.No mediastinal shift is noted. The visualized osseous structures appear unremarkable.  IMPRESSION: No significant interval change.    Assessment and Plan:   59y/o woman with end stage COPD on 3L O2 at home awaiting transplant at [facility], former smoker, CAD s/p stent, afib, DM2), raynaud phenomenon and recent hospitalization at [facility] for confusion and AUR[facility]A has been sent from [facility] with worsening of SOB. CT with multilobar opacities. Due to recent hospitalization and rehab stay, will cover for possible HCAP. Very high risk with a poor lung capacities.   10/16 s/p 10days of ceftriaxone and now on azithromycin daily, sputum showed rate mold, most likely not an invasive fungal infection, ABPA is a possibility, Eos% not high.    Legionella U ag negative   TTE, result noted, EF=55%,  CXR and CT with multilobar opacities    COPD, Pneumonia  - Blood culture NGTD  - First Sputum culture with normal respiratory richie and 2nd one with rare mold like fungus, will follow the result   - Galactomannan testing  - Pulmonary consult noted  - Azithromycin given by pulmonary for antiinflammatory effect and prophylaxis.   - Will hold on antifungal treatment for now     Dr. Deras is covering this weekend.     Wagner Juarez MD  Division of Infectious Diseases   Cell [phone] between 7am and 5pm   After 5pm and weekends please call ID service at [phone].

## 2020-10-16 NOTE — CONSULT NOTE ADULT - REASON FOR ADMISSION
COPD exacerbation, PNA

## 2020-10-17 LAB
ALBUMIN SERPL ELPH-MCNC: 2.7 G/DL — LOW (ref 3.3–5)
ALP SERPL-CCNC: 73 U/L — SIGNIFICANT CHANGE UP (ref 40–120)
ALT FLD-CCNC: 20 U/L — SIGNIFICANT CHANGE UP (ref 12–78)
ANION GAP SERPL CALC-SCNC: 6 MMOL/L — SIGNIFICANT CHANGE UP (ref 5–17)
AST SERPL-CCNC: 15 U/L — SIGNIFICANT CHANGE UP (ref 15–37)
BASOPHILS # BLD AUTO: 0.02 K/UL — SIGNIFICANT CHANGE UP (ref 0–0.2)
BASOPHILS NFR BLD AUTO: 0.1 % — SIGNIFICANT CHANGE UP (ref 0–2)
BILIRUB SERPL-MCNC: 0.3 MG/DL — SIGNIFICANT CHANGE UP (ref 0.2–1.2)
BUN SERPL-MCNC: 32 MG/DL — HIGH (ref 7–23)
CALCIUM SERPL-MCNC: 9.2 MG/DL — SIGNIFICANT CHANGE UP (ref 8.5–10.1)
CHLORIDE SERPL-SCNC: 97 MMOL/L — SIGNIFICANT CHANGE UP (ref 96–108)
CO2 SERPL-SCNC: 35 MMOL/L — HIGH (ref 22–31)
CREAT SERPL-MCNC: 0.98 MG/DL — SIGNIFICANT CHANGE UP (ref 0.5–1.3)
EOSINOPHIL # BLD AUTO: 0.06 K/UL — SIGNIFICANT CHANGE UP (ref 0–0.5)
EOSINOPHIL NFR BLD AUTO: 0.4 % — SIGNIFICANT CHANGE UP (ref 0–6)
GLUCOSE SERPL-MCNC: 169 MG/DL — HIGH (ref 70–99)
HCT VFR BLD CALC: 33.4 % — LOW (ref 34.5–45)
HGB BLD-MCNC: 9.9 G/DL — LOW (ref 11.5–15.5)
IMM GRANULOCYTES NFR BLD AUTO: 1.4 % — SIGNIFICANT CHANGE UP (ref 0–1.5)
LYMPHOCYTES # BLD AUTO: 0.7 K/UL — LOW (ref 1–3.3)
LYMPHOCYTES # BLD AUTO: 4.7 % — LOW (ref 13–44)
MCHC RBC-ENTMCNC: 24.1 PG — LOW (ref 27–34)
MCHC RBC-ENTMCNC: 29.6 GM/DL — LOW (ref 32–36)
MCV RBC AUTO: 81.5 FL — SIGNIFICANT CHANGE UP (ref 80–100)
MONOCYTES # BLD AUTO: 0.35 K/UL — SIGNIFICANT CHANGE UP (ref 0–0.9)
MONOCYTES NFR BLD AUTO: 2.3 % — SIGNIFICANT CHANGE UP (ref 2–14)
NEUTROPHILS # BLD AUTO: 13.66 K/UL — HIGH (ref 1.8–7.4)
NEUTROPHILS NFR BLD AUTO: 91.1 % — HIGH (ref 43–77)
NRBC # BLD: 0 /100 WBCS — SIGNIFICANT CHANGE UP (ref 0–0)
PLATELET # BLD AUTO: 350 K/UL — SIGNIFICANT CHANGE UP (ref 150–400)
POTASSIUM SERPL-MCNC: 4.6 MMOL/L — SIGNIFICANT CHANGE UP (ref 3.5–5.3)
POTASSIUM SERPL-SCNC: 4.6 MMOL/L — SIGNIFICANT CHANGE UP (ref 3.5–5.3)
PROT SERPL-MCNC: 7 G/DL — SIGNIFICANT CHANGE UP (ref 6–8.3)
RBC # BLD: 4.1 M/UL — SIGNIFICANT CHANGE UP (ref 3.8–5.2)
RBC # FLD: 16.9 % — HIGH (ref 10.3–14.5)
SODIUM SERPL-SCNC: 138 MMOL/L — SIGNIFICANT CHANGE UP (ref 135–145)
WBC # BLD: 15 K/UL — HIGH (ref 3.8–10.5)
WBC # FLD AUTO: 15 K/UL — HIGH (ref 3.8–10.5)

## 2020-10-17 PROCEDURE — 99232 SBSQ HOSP IP/OBS MODERATE 35: CPT | Mod: GC

## 2020-10-17 PROCEDURE — 99232 SBSQ HOSP IP/OBS MODERATE 35: CPT

## 2020-10-17 RX ORDER — BUMETANIDE 0.25 MG/ML
0.5 INJECTION INTRAMUSCULAR; INTRAVENOUS DAILY
Refills: 0 | Status: DISCONTINUED | OUTPATIENT
Start: 2020-10-17 | End: 2020-10-19

## 2020-10-17 RX ADMIN — Medication 4: at 08:31

## 2020-10-17 RX ADMIN — AZITHROMYCIN 500 MILLIGRAM(S): 500 TABLET, FILM COATED ORAL at 12:45

## 2020-10-17 RX ADMIN — Medication 4 UNIT(S): at 08:32

## 2020-10-17 RX ADMIN — Medication 4 UNIT(S): at 12:46

## 2020-10-17 RX ADMIN — Medication 1 TABLET(S): at 12:44

## 2020-10-17 RX ADMIN — Medication 0.25 MILLIGRAM(S): at 05:33

## 2020-10-17 RX ADMIN — Medication 4 UNIT(S): at 17:17

## 2020-10-17 RX ADMIN — Medication 2: at 12:45

## 2020-10-17 RX ADMIN — IRON SUCROSE 100 MILLIGRAM(S): 20 INJECTION, SOLUTION INTRAVENOUS at 09:01

## 2020-10-17 RX ADMIN — Medication 0.25 MILLIGRAM(S): at 21:38

## 2020-10-17 RX ADMIN — Medication 1000 UNIT(S): at 12:44

## 2020-10-17 RX ADMIN — Medication 325 MILLIGRAM(S): at 12:44

## 2020-10-17 RX ADMIN — Medication 30 MILLIGRAM(S): at 05:33

## 2020-10-17 RX ADMIN — MONTELUKAST 10 MILLIGRAM(S): 4 TABLET, CHEWABLE ORAL at 21:38

## 2020-10-17 RX ADMIN — BUDESONIDE AND FORMOTEROL FUMARATE DIHYDRATE 2 PUFF(S): 160; 4.5 AEROSOL RESPIRATORY (INHALATION) at 05:52

## 2020-10-17 RX ADMIN — Medication 240 MILLIGRAM(S): at 05:39

## 2020-10-17 RX ADMIN — Medication 500 MICROGRAM(S): at 01:12

## 2020-10-17 RX ADMIN — BUMETANIDE 1 MILLIGRAM(S): 0.25 INJECTION INTRAMUSCULAR; INTRAVENOUS at 00:17

## 2020-10-17 RX ADMIN — APIXABAN 5 MILLIGRAM(S): 2.5 TABLET, FILM COATED ORAL at 19:07

## 2020-10-17 RX ADMIN — Medication 81 MILLIGRAM(S): at 12:46

## 2020-10-17 RX ADMIN — APIXABAN 5 MILLIGRAM(S): 2.5 TABLET, FILM COATED ORAL at 05:33

## 2020-10-17 RX ADMIN — SENNA PLUS 2 TABLET(S): 8.6 TABLET ORAL at 21:38

## 2020-10-17 RX ADMIN — PANTOPRAZOLE SODIUM 40 MILLIGRAM(S): 20 TABLET, DELAYED RELEASE ORAL at 05:33

## 2020-10-17 RX ADMIN — ATORVASTATIN CALCIUM 80 MILLIGRAM(S): 80 TABLET, FILM COATED ORAL at 21:38

## 2020-10-17 RX ADMIN — TIOTROPIUM BROMIDE 1 CAPSULE(S): 18 CAPSULE ORAL; RESPIRATORY (INHALATION) at 21:38

## 2020-10-17 RX ADMIN — INSULIN GLARGINE 12 UNIT(S): 100 INJECTION, SOLUTION SUBCUTANEOUS at 21:41

## 2020-10-17 RX ADMIN — Medication 600 MILLIGRAM(S): at 05:33

## 2020-10-17 RX ADMIN — BUDESONIDE AND FORMOTEROL FUMARATE DIHYDRATE 2 PUFF(S): 160; 4.5 AEROSOL RESPIRATORY (INHALATION) at 20:50

## 2020-10-17 RX ADMIN — Medication 500 MICROGRAM(S): at 08:42

## 2020-10-17 RX ADMIN — BUMETANIDE 0.5 MILLIGRAM(S): 0.25 INJECTION INTRAMUSCULAR; INTRAVENOUS at 12:48

## 2020-10-17 RX ADMIN — Medication 500 MICROGRAM(S): at 13:19

## 2020-10-17 RX ADMIN — Medication 600 MILLIGRAM(S): at 19:07

## 2020-10-17 NOTE — PROGRESS NOTE ADULT - ASSESSMENT
cont rx   cont rx      MINNIE ALONZO  DOA 10/6/2020 DR JASON WALKER            REVIEW OF SYMPTOMS      Able to give ROS  Yes     RELIABLE No   CONSTITUTIONAL Weakness Yes  Chills No Vision changes No  ENDOCRINE No unexplained hair loss No heat or cold intolerance    ALLERGY No hives  Sore throat No   RESP Coughing blood no  Shortness of breath YES   NEURO No Headache  Confusion Pain neck No   CARDIAC No Chest pain No Palpitations   GI No Pain abdomen NO   Vomiting NO     PHYSICAL EXAM    HEENT Unremarkable PERRLA atraumatic   RESP Fair air entry EXP prolonged    Harsh breath sound Resp distres mild   CARDIAC S1 S2 No S3     NO JVD    ABDOMEN SOFT BS PRESENT NOT DISTENDED No hepatosplenomegaly PEDAL EDEMA present No calf tenderness  NO rash   GENERAL Not TOXIC looking    PT DATA/BEST PRACTICE  ALLERGY        albuterol      WT                  10/6/2020 76              BMI                   10/6/2020 28                       ADVANCED DIRECTIVE     HEAD OF BED ELEVATION Yes      DVT PROPHYLAXIS.     Apixaban 5.2 (10/5)  (a f)   DIET. dash cons carb (10/9)                                                                          FUNEZ PROPHYLAXIS. protonix 40 (10/6)   INFECTION PPLX                                                    OXYGENATION.   10/15-10/16/2020 5l 96%- 5l 100%       VITALS.   10/17/2020 afeb 70 120/60       PATIENT SUMMARY.   60F w/ end stage COPD on 3LNC (trying to get on  transplant list at [facility]), former smoker, CAD s/p stent (2016), afib on eliquis, uncontrolled DM2 (on insulin), raynaud phenomenon and recent hospitalization at [facility] for confusion and AURORA p/w sob, admitted for acute on chronic resp failure with hypoxia and hyercarbia sec to multifocal PNA and COPD exacerbation with end stage COPD.   Dr Rojas asked me to cover pulm 10/15-10/18/2020     PROBLEM/ASSESSMENT/RECOMMENDATIONS.  GAS EXCHANGE   10/16/2020 3.5l  738/57/71  Pt has ho hypercapnic resp failure Avoid excess oxygen Keep po 90-95% range  PNEUMONIA Is on azithro (rocephindced 10/16/2020)  As sputum showed fungus will check galactomannan and ige to look for abpa   NOTE eosinophils sl elevated despite steroids aec 380 on 10/16  COPD On bs steroids Is intoleratnt to albuterol but is on laba lama ics and po steroids Cont rx  CHF ECHO 10/6/2020 echo ef 55% pasp 15 dd   May have element of d chf No PH Bumetanide .5 started 10/17/2020 by renal   VTE Unlikely as sshe is on doac  A fib On apixaban cardizem Rate controlled anticoagulated cont rx  ANEMIA Likely acd Monitor for drop in Hb Target Hb 7 (+)    TIME SPENT   Over 25 minutes aggregate care time spent on encounter; activities included   direct patient care, counseling and/or coordinating care reviewing notes, lab data/ imaging , discussion with multidisciplinary team/ patient  /family and explaining in detail risks, benefits, alternatives  of the recommendations     MINNIE ALONZO  DOA 10/6/2020 DR JASON WALKER

## 2020-10-17 NOTE — PROGRESS NOTE ADULT - SUBJECTIVE AND OBJECTIVE BOX
Patient is a 60y old  Female who presents with a chief complaint of COPD exacerbation, PNA (08 Oct 2020 11:37)       HPI:  60F w/ end stage COPD on 3LNC (pending transplant list at [facility]), former smoker, CAD s/p stent (2016), afib on eliquis, uncontrolled DM2 (on insulin), raynaud phenomenon and recent hospitalization at [facility] for confusion and AUR[facility]A p/w sob. Sent from [facility]. Patient states that she has had worsening shortness of breath for past two days. Unable to obtain comprehensive history due to dyspnea. Patient denies fever, chills, sore throat, cough, chest pain, palpitations, abd pain, n/v. Currently feels short of breath even after receiving duonebs and steroids in the ED. Unable to keep mask on during interview and lay back in stretcher due to subjective sob. Patient repeatedly stating that it is too hot in her room and fanning herself with a paper. Per chart, Dr. Mathew (PCP) has chart notes regarding possible hallucinations. Would like her home medications now but otherwise has no acute complaints.  Has not seen nephrologist.  Denies any N/V.  SOB improving.  States she is urinating well.  Denies any urinary retention.  Denies any supplement or NSAID usage.         Still has SOB and NIELSON, c/o edema    PAST MEDICAL & SURGICAL HISTORY:  H/O Raynaud&#x27;s syndrome    Ascorbic acid deficiency    Iron deficiency anemia    Constipation    GERD without esophagitis    Type 2 diabetes mellitus    HTN (hypertension)    Heart failure    HLD (hyperlipidemia)    History of chronic atrial fibrillation  on Eliquis    End stage COPD  on 3LNC    Atrial fibrillation    Hyperlipemia    Chronic sinusitis    Raynaud phenomenon    HTN (hypertension)    DM (diabetes mellitus)    Claustrophobia    COPD (chronic obstructive pulmonary disease)    History of tonsillectomy    S/P tonsillectomy         FAMILY HISTORY:  FH: myocardial infarction    Family history of CABG    FH: MI (myocardial infarction)    FH: CABG (coronary artery bypass surgery)    NC    Social History:Non smoker    MEDICATIONS  (STANDING):  apixaban 5 milliGRAM(s) Oral every 12 hours  ascorbic acid 500 milliGRAM(s) Oral daily  aspirin  chewable 81 milliGRAM(s) Oral daily  atorvastatin 80 milliGRAM(s) Oral at bedtime  azithromycin  IVPB 500 milliGRAM(s) IV Intermittent every 24 hours  budesonide 160 MICROgram(s)/formoterol 4.5 MICROgram(s) Inhaler 2 Puff(s) Inhalation two times a day  cefepime   IVPB 2000 milliGRAM(s) IV Intermittent every 12 hours  cholecalciferol 1000 Unit(s) Oral daily  dextrose 5%. 1000 milliLiter(s) (50 mL/Hr) IV Continuous <Continuous>  dextrose 50% Injectable 12.5 Gram(s) IV Push once  dextrose 50% Injectable 25 Gram(s) IV Push once  dextrose 50% Injectable 25 Gram(s) IV Push once  ferrous    sulfate 325 milliGRAM(s) Oral daily  insulin glargine Injectable (LANTUS) 10 Unit(s) SubCutaneous at bedtime  insulin lispro (HumaLOG) corrective regimen sliding scale   SubCutaneous three times a day before meals  insulin lispro Injectable (HumaLOG) 3 Unit(s) SubCutaneous three times a day before meals  montelukast 10 milliGRAM(s) Oral at bedtime  multivitamin 1 Tablet(s) Oral daily  pantoprazole    Tablet 40 milliGRAM(s) Oral before breakfast  polyethylene glycol 3350 17 Gram(s) Oral daily  potassium chloride    Tablet ER 40 milliEquivalent(s) Oral every 4 hours  senna 2 Tablet(s) Oral at bedtime  tiotropium 18 MICROgram(s) Capsule 1 Capsule(s) Inhalation daily    MEDICATIONS  (PRN):  acetaminophen   Tablet .. 650 milliGRAM(s) Oral every 6 hours PRN Mild Pain (1 - 3)  bisacodyl Suppository 10 milliGRAM(s) Rectal daily PRN Constipation  dextrose 40% Gel 15 Gram(s) Oral once PRN Blood Glucose LESS THAN 70 milliGRAM(s)/deciliter  glucagon  Injectable 1 milliGRAM(s) IntraMuscular once PRN Glucose LESS THAN 70 milligrams/deciliter  guaiFENesin   Syrup  (Sugar-Free) 100 milliGRAM(s) Oral every 6 hours PRN Cough  lactulose Syrup 15 Gram(s) Oral two times a day PRN constipation  levalbuterol Inhalation 0.63 milliGRAM(s) Inhalation every 4 hours PRN shortness of breath and/or wheezing  oxyCODONE    IR 5 milliGRAM(s) Oral every 6 hours PRN Moderate Pain (4 - 6)   Meds reviewed    Allergies    No Known Allergies    Intolerances    albuterol (Unknown)       REVIEW OF SYSTEMS:    CONSTITUTIONAL:  No weight loss   EYES: No eye pain, visual disturbances, or discharge  ENMT:  No difficulty hearing, tinnitus, vertigo; No sinus or throat pain  NECK: No pain or stiffness  BREASTS: No pain, masses, or nipple discharge  RESPIRATORY: +SOB. +NIELSNO  CARDIOVASCULAR: No chest pain, palpitations, dizziness,   GASTROINTESTINAL: No abdominal or epigastric pain. No nausea, vomiting, or hematemesis;  GENITOURINARY: No dysuria, frequency, hematuria, or incontinence  NEUROLOGICAL: No headaches, memory loss, loss of strength, numbness, or tremors  SKIN: no Diffuse erythema, no blisters  LYMPH NODES: No enlarged glands  ENDOCRINE: No heat or cold intolerance; No hair loss  MUSCULOSKELETAL: No joint pain or swelling   PSYCHIATRIC: No depression, anxiety, mood swings, or difficulty sleeping  ALLERGY AND IMMUNOLOGIC: No hives or eczema    ICU Vital Signs Last 24 Hrs  T(C): 36.4 (16 Oct 2020 20:39), Max: 36.4 (16 Oct 2020 20:39)  T(F): 97.6 (16 Oct 2020 20:39), Max: 97.6 (16 Oct 2020 20:39)  HR: 75 (17 Oct 2020 05:37) (71 - 79)  BP: 175/61 (17 Oct 2020 05:37) (153/77 - 175/61)  BP(mean): --  ABP: --  ABP(mean): --  RR: 22 (17 Oct 2020 05:37) (22 - 22)  SpO2: 95% (17 Oct 2020 05:37) (95% - 100%)              PHYSICAL EXAM:    GENERAL: No distress  HEAD:  Atraumatic, Normocephalic  EYES: EOMI, conjunctiva and sclera clear  ENMT: No drainage from nares, no drainage from pinna  NECK: Supple, neck  veins full  NERVOUS SYSTEM:  Alert & Oriented X3, Good concentration; Motor Strength wnl upper and lower extremities  CHEST/LUNG: Decreased BS,no rales, no rhonchi, Mild wheezing  HEART: Regular rate and rhythm; No murmurs, rubs, or gallops  ABDOMEN: Soft, Nontender, Nondistended; Bowel sounds present,  EXTREMITIES: trace Edema  SKIN: No rashes or lesions, pale      LABS:                                                         9.9    15.00 )-----------( 350      ( 17 Oct 2020 12:11 )             33.4     10-17    138  |  97  |  32<H>  ----------------------------<  169<H>  4.6   |  35<H>  |  0.98    Ca    9.2      17 Oct 2020 12:11    TPro  7.0  /  Alb  2.7<L>  /  TBili  0.3  /  DBili  x   /  AST  15  /  ALT  20  /  AlkPhos  73  10-17          ABG - ( 16 Oct 2020 11:26 )  pH, Arterial: 7.38  pH, Blood: x     /  pCO2: 57    /  pO2: 70    / HCO3: 30    / Base Excess: 7.3   /  SaO2: 93

## 2020-10-17 NOTE — PROGRESS NOTE ADULT - PROBLEM SELECTOR PLAN 2
on 3LNC at rehab, currently requiring 5L NC with BIPAP, wean as tolerated  - continue spiriva, symbicort, montelukast, inhaler PRN, atrovent   -theophylline held given tachycardia and brief afib  - BIPAP qhs and PRN -- pt counseled to use more frequently   -of note pt is not on transplant list yet, is planning to go on list at [facility] if medically stable and improved

## 2020-10-17 NOTE — PROGRESS NOTE ADULT - SUBJECTIVE AND OBJECTIVE BOX
Interval History:  continues on treatment for COPD and respiratory difficulty  remains SOB  Chart reviewed and events noted;   Overnight events:    MEDICATIONS  (STANDING):  apixaban 5 milliGRAM(s) Oral every 12 hours  aspirin  chewable 81 milliGRAM(s) Oral daily  atorvastatin 80 milliGRAM(s) Oral at bedtime  azithromycin   Tablet 500 milliGRAM(s) Oral daily  Biotene Dry Mouth Oral Rinse 5 milliLiter(s) Swish and Spit two times a day  budesonide 160 MICROgram(s)/formoterol 4.5 MICROgram(s) Inhaler 2 Puff(s) Inhalation two times a day  buMETAnide 0.5 milliGRAM(s) Oral daily  cholecalciferol 1000 Unit(s) Oral daily  dextrose 5%. 1000 milliLiter(s) (50 mL/Hr) IV Continuous <Continuous>  dextrose 50% Injectable 12.5 Gram(s) IV Push once  dextrose 50% Injectable 25 Gram(s) IV Push once  dextrose 50% Injectable 25 Gram(s) IV Push once  diltiazem    milliGRAM(s) Oral daily  fentaNYL   Patch  12 MICROgram(s)/Hr 1 Patch Transdermal every 72 hours  ferrous    sulfate 325 milliGRAM(s) Oral daily  guaiFENesin  milliGRAM(s) Oral every 12 hours  insulin glargine Injectable (LANTUS) 12 Unit(s) SubCutaneous at bedtime  insulin lispro (HumaLOG) corrective regimen sliding scale   SubCutaneous three times a day before meals  insulin lispro (HumaLOG) corrective regimen sliding scale   SubCutaneous at bedtime  insulin lispro Injectable (HumaLOG) 4 Unit(s) SubCutaneous three times a day before meals  ipratropium    for Nebulization 500 MICROGram(s) Nebulizer every 6 hours  iron sucrose Injectable 100 milliGRAM(s) IV Push every 24 hours  montelukast 10 milliGRAM(s) Oral at bedtime  multivitamin 1 Tablet(s) Oral daily  pantoprazole    Tablet 40 milliGRAM(s) Oral before breakfast  polyethylene glycol 3350 17 Gram(s) Oral daily  predniSONE   Tablet 30 milliGRAM(s) Oral daily  senna 2 Tablet(s) Oral at bedtime  tiotropium 18 MICROgram(s) Capsule 1 Capsule(s) Inhalation daily    MEDICATIONS  (PRN):  acetaminophen   Tablet .. 650 milliGRAM(s) Oral every 6 hours PRN Mild Pain (1 - 3)  ALPRAZolam 0.25 milliGRAM(s) Oral every 8 hours PRN anxiety  bisacodyl Suppository 10 milliGRAM(s) Rectal daily PRN Constipation  dextrose 40% Gel 15 Gram(s) Oral once PRN Blood Glucose LESS THAN 70 milliGRAM(s)/deciliter  glucagon  Injectable 1 milliGRAM(s) IntraMuscular once PRN Glucose LESS THAN 70 milligrams/deciliter  lactulose Syrup 15 Gram(s) Oral two times a day PRN constipation      Vital Signs Last 24 Hrs  T(C): 36.7 (17 Oct 2020 13:06), Max: 36.7 (17 Oct 2020 13:06)  T(F): 98.1 (17 Oct 2020 13:06), Max: 98.1 (17 Oct 2020 13:06)  HR: 82 (17 Oct 2020 13:19) (71 - 86)  BP: 122/69 (17 Oct 2020 13:06) (122/69 - 175/61)  BP(mean): --  RR: 20 (17 Oct 2020 13:06) (20 - 22)  SpO2: 98% (17 Oct 2020 13:19) (95% - 100%)    PHYSICAL EXAM  General: adult  with bipap, high flow O2  HEENT: clear oropharynx, anicteric sclera, pink conjunctivae  Neck: supple  CV: normal S1S2 with no murmur rubs or gallops  Lungs: decreased BS bilateral  Abdomen: soft non-tender non-distended, no hepato/splenomegaly  Ext: no clubbing cyanosis or edema  Skin: no rashes and no petichiae  Neuro: alert and oriented X3 no focal deficits      LABS:  CBC Full  -  ( 17 Oct 2020 12:11 )  WBC Count : 15.00 K/uL  RBC Count : 4.10 M/uL  Hemoglobin : 9.9 g/dL  Hematocrit : 33.4 %  Platelet Count - Automated : 350 K/uL  Mean Cell Volume : 81.5 fl  Mean Cell Hemoglobin : 24.1 pg  Mean Cell Hemoglobin Concentration : 29.6 gm/dL  Auto Neutrophil # : 13.66 K/uL  Auto Lymphocyte # : 0.70 K/uL  Auto Monocyte # : 0.35 K/uL  Auto Eosinophil # : 0.06 K/uL  Auto Basophil # : 0.02 K/uL  Auto Neutrophil % : 91.1 %  Auto Lymphocyte % : 4.7 %  Auto Monocyte % : 2.3 %  Auto Eosinophil % : 0.4 %  Auto Basophil % : 0.1 %    10-17    138  |  97  |  32<H>  ----------------------------<  169<H>  4.6   |  35<H>  |  0.98    Ca    9.2      17 Oct 2020 12:11    TPro  7.0  /  Alb  2.7<L>  /  TBili  0.3  /  DBili  x   /  AST  15  /  ALT  20  /  AlkPhos  73  10-17        fe studies      WBC trend  15.00 K/uL (10-17-20 @ 12:11)  9.28 K/uL (10-16-20 @ 06:02)  15.05 K/uL (10-15-20 @ 09:09)      Hgb trend  9.9 g/dL (10-17-20 @ 12:11)  8.6 g/dL (10-16-20 @ 06:02)  9.4 g/dL (10-15-20 @ 09:09)      plt trend  350 K/uL (10-17-20 @ 12:11)  312 K/uL (10-16-20 @ 06:02)  291 K/uL (10-15-20 @ 09:09)        RADIOLOGY & ADDITIONAL STUDIES:

## 2020-10-17 NOTE — PROGRESS NOTE ADULT - PROBLEM SELECTOR PLAN 1
likely multifactorial from multifocal PNA in setting of end stage COPD   - slow clinical improvement  - currently on 5L NC with BIPAP qhs -- maintain O2 >/ 88  - Blood culture negative, initial Sputum cx w/ normal respiratory richie  - repeat Sputum cx (10/12) : rare mold-like fungus   - f/u IgE, Aspergillus antigen to r/o abpa  - heme/onc Dr. jaramillo's consulted to R/o MM in setting of sputum culture findings and positive SPEP--recommend repeat MGUS studies outpatient  - ID, Dr. Juarez--Fungitell and galactomannan cx; will hold antifungals for now   - Pulm consulted, recs appreciated  - Pulm (Dr. Rojas)

## 2020-10-17 NOTE — PROGRESS NOTE ADULT - PROBLEM SELECTOR PLAN 9
on oxycodone and tylenol PRN at rehab for chronic pain, continue  -apprec palliative care collaboration - Fentanyl patch and Xanax 0.25mg q8 PRN

## 2020-10-17 NOTE — PROGRESS NOTE ADULT - SUBJECTIVE AND OBJECTIVE BOX
[facility] -- Adriana Gonzales, Gina, Funmilayo, Dylan Whitley Savella  Office # [phone]      Follow Up:    sob  Subjective/Observations:   Seen at bedside, + shortness of breath  reports 12 pound weight gain in one week     REVIEW OF SYSTEMS: All other review of systems is negative unless indicated above    PAST MEDICAL & SURGICAL HISTORY:  Ascorbic acid deficiency    Iron deficiency anemia    Constipation    GERD without esophagitis    Type 2 diabetes mellitus    HTN (hypertension)    Heart failure    End stage COPD  on 3LNC    Atrial fibrillation    Hyperlipemia    Chronic sinusitis    Raynaud phenomenon    Claustrophobia    COPD (chronic obstructive pulmonary disease)    S/P tonsillectomy        MEDICATIONS  (STANDING):  apixaban 5 milliGRAM(s) Oral every 12 hours  aspirin  chewable 81 milliGRAM(s) Oral daily  atorvastatin 80 milliGRAM(s) Oral at bedtime  azithromycin   Tablet 500 milliGRAM(s) Oral daily  Biotene Dry Mouth Oral Rinse 5 milliLiter(s) Swish and Spit two times a day  budesonide 160 MICROgram(s)/formoterol 4.5 MICROgram(s) Inhaler 2 Puff(s) Inhalation two times a day  buMETAnide 0.5 milliGRAM(s) Oral daily  cholecalciferol 1000 Unit(s) Oral daily  dextrose 5%. 1000 milliLiter(s) (50 mL/Hr) IV Continuous <Continuous>  dextrose 50% Injectable 12.5 Gram(s) IV Push once  dextrose 50% Injectable 25 Gram(s) IV Push once  dextrose 50% Injectable 25 Gram(s) IV Push once  diltiazem    milliGRAM(s) Oral daily  fentaNYL   Patch  12 MICROgram(s)/Hr 1 Patch Transdermal every 72 hours  ferrous    sulfate 325 milliGRAM(s) Oral daily  guaiFENesin  milliGRAM(s) Oral every 12 hours  insulin glargine Injectable (LANTUS) 12 Unit(s) SubCutaneous at bedtime  insulin lispro (HumaLOG) corrective regimen sliding scale   SubCutaneous three times a day before meals  insulin lispro (HumaLOG) corrective regimen sliding scale   SubCutaneous at bedtime  insulin lispro Injectable (HumaLOG) 4 Unit(s) SubCutaneous three times a day before meals  ipratropium    for Nebulization 500 MICROGram(s) Nebulizer every 6 hours  iron sucrose Injectable 100 milliGRAM(s) IV Push every 24 hours  montelukast 10 milliGRAM(s) Oral at bedtime  multivitamin 1 Tablet(s) Oral daily  pantoprazole    Tablet 40 milliGRAM(s) Oral before breakfast  polyethylene glycol 3350 17 Gram(s) Oral daily  predniSONE   Tablet 30 milliGRAM(s) Oral daily  senna 2 Tablet(s) Oral at bedtime  tiotropium 18 MICROgram(s) Capsule 1 Capsule(s) Inhalation daily    MEDICATIONS  (PRN):  acetaminophen   Tablet .. 650 milliGRAM(s) Oral every 6 hours PRN Mild Pain (1 - 3)  ALPRAZolam 0.25 milliGRAM(s) Oral every 8 hours PRN anxiety  bisacodyl Suppository 10 milliGRAM(s) Rectal daily PRN Constipation  dextrose 40% Gel 15 Gram(s) Oral once PRN Blood Glucose LESS THAN 70 milliGRAM(s)/deciliter  glucagon  Injectable 1 milliGRAM(s) IntraMuscular once PRN Glucose LESS THAN 70 milligrams/deciliter  lactulose Syrup 15 Gram(s) Oral two times a day PRN constipation      Allergies    No Known Allergies    Intolerances    albuterol (Unknown)      Vital Signs Last 24 Hrs  T(C): 36.4 (16 Oct 2020 20:39), Max: 36.8 (16 Oct 2020 12:19)  T(F): 97.6 (16 Oct 2020 20:39), Max: 98.2 (16 Oct 2020 12:19)  HR: 75 (17 Oct 2020 05:37) (71 - 79)  BP: 175/61 (17 Oct 2020 05:37) (114/73 - 175/61)  BP(mean): --  RR: 22 (17 Oct 2020 05:37) (20 - 22)  SpO2: 95% (17 Oct 2020 05:37) (95% - 100%)    I&O's Summary    16 Oct 2020 07:01  -  17 Oct 2020 07:00  --------------------------------------------------------  IN: 0 mL / OUT: 800 mL / NET: -800 mL          PHYSICAL EXAM:  TELE: SR  Constitutional: NAD, awake and alert, obese   HEENT: Moist Mucous Membranes, Anicteric  Pulmonary: Non-labored, breath sounds are clear bilaterally, No wheezing, crackles or rhonchi  Cardiovascular: Regular, S1 and S2 nl, No murmurs, rubs, gallops or clicks  Gastrointestinal: Bowel Sounds present, soft, nontender.   Lymph: pitting edema bilaterally   Skin: No visible rashes or ulcers.  Psych:  Mood & affect appropriate    LABS: All Labs Reviewed:                        8.6    9.28  )-----------( 312      ( 16 Oct 2020 06:02 )             29.9                         9.4    15.05 )-----------( 291      ( 15 Oct 2020 09:09 )             31.7     16 Oct 2020 06:02    141    |  100    |  30     ----------------------------<  179    4.9     |  38     |  1.00   15 Oct 2020 09:09    140    |  101    |  28     ----------------------------<  140    4.8     |  34     |  0.98     Ca    9.0        16 Oct 2020 06:02  Ca    8.9        15 Oct 2020 09:09  Phos  3.3       15 Oct 2020 09:09  Mg     2.1       15 Oct 2020 09:09    TPro  5.9    /  Alb  2.4    /  TBili  0.2    /  DBili  x      /  AST  11     /  ALT  16     /  AlkPhos  61     16 Oct 2020 06:02             ECG:  < from: 12 Lead ECG (10.08.20 @ 08:51) >    Ventricular Rate 109 BPM    Atrial Rate 109 BPM    P-R Interval 150 ms    QRS Duration 136 ms    Q-T Interval 390 ms    QTC Calculation(Bazett) 525 ms    P Axis 56 degrees    R Axis -16 degrees    T Axis 57 degrees    Diagnosis Line Sinus tachycardia with premature supraventricular complexes  Right bundle branch block  Abnormal ECG    < end of copied text >      Echo:  < from: TTE Echo Complete w/o Contrast w/ Doppler (10.06.20 @ 17:10) >      OBSERVATIONS:  Technically difficult and limited study  Mitral Valve: Thickened leaflets, mild MR.  Aortic Valve/Aorta: Aortic valve is not well-visualized, grossly normal function without severe pathology  Tricuspid Valve: normal with trace TR.  Pulmonic Valve: Not well-visualized  Left Atrium: normal  Right Atrium: Not well-visualized  Left Ventricle: normal LV size and systolic function, estimated LVEF of 55%. Mild LVH. Endocardium is not well-visualized, cannot rule out segmental dysfunction  Right Ventricle: Grossly normal size and systolic function.  Pericardium/Pleura: normal, no significant pericardial effusion.  Pulmonary/RV Pressure: estimated PA systolic pressure of 15.7 mmHg assuming an RA pressure of 3 mmHg.  LV diastolic dysfunction is present    Conclusion:  Technically difficult and limited study  Mild concentric LVH with grossly normal left ventricular systolic function, estimated LVEF of 55%.  Grossly normal RV size and systolic function.  Aortic valve is not well-visualized, grossly normal function without severe pathology  Mild MR  Trace TR.  No significant pericardial effusion.      < end of copied text >      Radiology:

## 2020-10-17 NOTE — PROGRESS NOTE ADULT - ASSESSMENT
AURORA on CKD 2  Hyponatremia  Hypokalemia  COPD  Hypercalcemia     -BLCr 1  -AURORA unclear etiology, clinically ATN  -UA - 3-5 WBC, trace ketones, 30 proteins  -FeUrea 34.6%  -UPC 0.44  -Ur osm 454  -Corrected calcium 10.2, with paraprotein gap and anemia; FLC ratio is normal; SPEP with gamma migrating protein seen  -Iron repletion   -Metformin on hold  -Renal indices are stable at baseline; Bumex restarted  -Less likely AIN given paucity of WBC on UA and no peripheral eosinophils  -No IVF needed at present  -Strict I&Os  -Pulm follow up noted  -Heme/onc note reviewed.  Possible MGUS, repeat studies as outpatient

## 2020-10-17 NOTE — PROGRESS NOTE ADULT - ASSESSMENT
[ASSESSMENT and  PLAN]  60F w/ end stage COPD on 3LNC (trying to get on  transplant list at [facility]), former smoker, CAD s/p stent (2016), afib on eliquis, DM2 (on insulin), raynaud phenomenon and recent hospitalization at [facility] for confusion and AURORA p/w sob, admitted for acute on chronic resp failure with hypoxia and hyercarbia sec to multifocal PNA and COPD exacerabtion with end stage COPD.     Being evaluated for lung transplant.   had lost weight.     {[id number]}Acute on chronic respiratory failure with hypoxia and hypercapnia.  Plan: likely multifactorial from multifocal PNA in setting of end stage COPD     Now noted to have MGUS.   IgG-Lambda monoclonal protein.   SPEP with faint M spike of 0.1g/dL    anemia due to chronic disease with unremarkable fe studies with ferritin 98    RECOMMENDATIONS  Tx of pneumonia per pulmonary and ID.   Recommend repeat MGUS studies outpt.   As pt undergoing txplant evaluation, to have re-eval with hematologist associated with transplant center.   Unclear if faint band may disappear on own post infection or may persisit.     Limited PRBC transfusion to only necessary transfusion, to prevent alloimmunization.   Follow CBC    DVT Prophyalxis  On Apixiban    followup CBC.  if hgb decreases can consider IV venofer with current ferritin.  otherwise to continue to observe.

## 2020-10-17 NOTE — PROGRESS NOTE ADULT - SUBJECTIVE AND OBJECTIVE BOX
CHERI HARTMAN    [facility] 3WES 362 W1    Patient is a 60y old  Female who presents with a chief complaint of COPD exacerbation, PNA (17 Oct 2020 10:27)       Allergies    No Known Allergies    Intolerances    albuterol (Unknown)      HPI:  60F w/ end stage COPD on 3LNC (pending transplant list at [facility]), former smoker, CAD s/p stent (2016), afib on eliquis, uncontrolled DM2 (on insulin), raynaud phenomenon and recent hospitalization at [facility] for confusion and AURORA p/w sob. [facility] Patient states that she has had worsening shortness of breath for past two days. Unable to obtain comprehensive history due to dyspnea. Patient denies fever, chills, sore throat, cough, chest pain, palpitations, abd pain, n/v. Currently feels short of breath even after receiving duonebs and steroids in the ED. Unable to keep mask on during interview and lay back in stretcher due to subjective sob. Patient repeatedly stating that it is too hot in her room and fanning herself with a paper. Per chart, Dr. Mathew (PCP) has chart notes regarding possible hallucinations. Would like her home medications now but otherwise has no acute complaints.    In the ED, VS T97.7F  /78 RR 21 SpO2 94% on 4LNC. Labs significant for mild leukocytosis of 11.41, H/H 9.9/30.5, thrombophilia of 435. CO2 35, albumin 2.4.   CXR done showing b/l patchy infiltrates, official read pending  CT chest done showing multifocal PNA and reactive mediastinal lymphadenopathy  EKG read limited by artifact, sinus tachycardia with RBBB and premature supraventricular complexes (06 Oct 2020 04:55)      PAST MEDICAL & SURGICAL HISTORY:  Ascorbic acid deficiency    Iron deficiency anemia    Constipation    GERD without esophagitis    Type 2 diabetes mellitus    HTN (hypertension)    Heart failure    End stage COPD  on 3LNC    Atrial fibrillation    Hyperlipemia    Chronic sinusitis    Raynaud phenomenon    Claustrophobia    COPD (chronic obstructive pulmonary disease)    S/P tonsillectomy        FAMILY HISTORY:  FH: myocardial infarction    Family history of CABG    FH: MI (myocardial infarction)    FH: CABG (coronary artery bypass surgery)          MEDICATIONS   acetaminophen   Tablet .. 650 milliGRAM(s) Oral every 6 hours PRN  ALPRAZolam 0.25 milliGRAM(s) Oral every 8 hours PRN  apixaban 5 milliGRAM(s) Oral every 12 hours  aspirin  chewable 81 milliGRAM(s) Oral daily  atorvastatin 80 milliGRAM(s) Oral at bedtime  azithromycin   Tablet 500 milliGRAM(s) Oral daily  Biotene Dry Mouth Oral Rinse 5 milliLiter(s) Swish and Spit two times a day  bisacodyl Suppository 10 milliGRAM(s) Rectal daily PRN  budesonide 160 MICROgram(s)/formoterol 4.5 MICROgram(s) Inhaler 2 Puff(s) Inhalation two times a day  buMETAnide 0.5 milliGRAM(s) Oral daily  cholecalciferol 1000 Unit(s) Oral daily  dextrose 40% Gel 15 Gram(s) Oral once PRN  dextrose 5%. 1000 milliLiter(s) IV Continuous <Continuous>  dextrose 50% Injectable 12.5 Gram(s) IV Push once  dextrose 50% Injectable 25 Gram(s) IV Push once  dextrose 50% Injectable 25 Gram(s) IV Push once  diltiazem    milliGRAM(s) Oral daily  fentaNYL   Patch  12 MICROgram(s)/Hr 1 Patch Transdermal every 72 hours  ferrous    sulfate 325 milliGRAM(s) Oral daily  glucagon  Injectable 1 milliGRAM(s) IntraMuscular once PRN  guaiFENesin  milliGRAM(s) Oral every 12 hours  insulin glargine Injectable (LANTUS) 12 Unit(s) SubCutaneous at bedtime  insulin lispro (HumaLOG) corrective regimen sliding scale   SubCutaneous three times a day before meals  insulin lispro (HumaLOG) corrective regimen sliding scale   SubCutaneous at bedtime  insulin lispro Injectable (HumaLOG) 4 Unit(s) SubCutaneous three times a day before meals  ipratropium    for Nebulization 500 MICROGram(s) Nebulizer every 6 hours  iron sucrose Injectable 100 milliGRAM(s) IV Push every 24 hours  lactulose Syrup 15 Gram(s) Oral two times a day PRN  montelukast 10 milliGRAM(s) Oral at bedtime  multivitamin 1 Tablet(s) Oral daily  pantoprazole    Tablet 40 milliGRAM(s) Oral before breakfast  polyethylene glycol 3350 17 Gram(s) Oral daily  predniSONE   Tablet 30 milliGRAM(s) Oral daily  senna 2 Tablet(s) Oral at bedtime  tiotropium 18 MICROgram(s) Capsule 1 Capsule(s) Inhalation daily      Vital Signs Last 24 Hrs  T(C): 36.4 (16 Oct 2020 20:39), Max: 36.4 (16 Oct 2020 20:39)  T(F): 97.6 (16 Oct 2020 20:39), Max: 97.6 (16 Oct 2020 20:39)  HR: 75 (17 Oct 2020 05:37) (71 - 79)  BP: 175/61 (17 Oct 2020 05:37) (153/77 - 175/61)  BP(mean): --  RR: 22 (17 Oct 2020 05:37) (22 - 22)  SpO2: 95% (17 Oct 2020 05:37) (95% - 100%)      10-16-20 @ 07:01  -  10-17-20 @ 07:00  --------------------------------------------------------  IN: 0 mL / OUT: 800 mL / NET: -800 mL    10-17-20 @ 07:01  -  10-17-20 @ 12:24  --------------------------------------------------------  IN: 0 mL / OUT: 800 mL / NET: -800 mL            LABS:                        9.9    15.00 )-----------( 350      ( 17 Oct 2020 12:11 )             33.4     10-16    141  |  100  |  30<H>  ----------------------------<  179<H>  4.9   |  38<H>  |  1.00    Ca    9.0      16 Oct 2020 06:02    TPro  5.9<L>  /  Alb  2.4<L>  /  TBili  0.2  /  DBili  x   /  AST  11<L>  /  ALT  16  /  AlkPhos  61  10-16          ABG - ( 16 Oct 2020 11:26 )  pH, Arterial: 7.38  pH, Blood: x     /  pCO2: 57    /  pO2: 70    / HCO3: 30    / Base Excess: 7.3   /  SaO2: 93                  WBC:  WBC Count: 15.00 K/uL (10-17 @ 12:11)  WBC Count: 9.28 K/uL (10-16 @ 06:02)  WBC Count: 15.05 K/uL (10-15 @ 09:09)  WBC Count: 12.11 K/uL (10-14 @ 08:35)      MICROBIOLOGY:  RECENT CULTURES:  10-12 .Sputum Sputum XXXX   No polymorphonuclear leukocytes per low power field  Rare Squamous epithelial cells per low power field  Few Gram Negative Rods per oil power field  Rare Gram positive cocci in pairs per oil power field   Rare Mold like fungus  Normal Respiratory Samaria present                    Sodium:  Sodium, Serum: 141 mmol/L (10-16 @ 06:02)  Sodium, Serum: 140 mmol/L (10-15 @ 09:09)  Sodium, Serum: 139 mmol/L (10-14 @ 08:35)      1.00 mg/dL 10-16 @ 06:02  0.98 mg/dL 10-15 @ 09:09  0.87 mg/dL 10-14 @ 08:35      Hemoglobin:  Hemoglobin: 9.9 g/dL (10-17 @ 12:11)  Hemoglobin: 8.6 g/dL (10-16 @ 06:02)  Hemoglobin: 9.4 g/dL (10-15 @ 09:09)  Hemoglobin: 10.1 g/dL (10-14 @ 08:35)      Platelets: Platelet Count - Automated: 350 K/uL (10-17 @ 12:11)  Platelet Count - Automated: 312 K/uL (10-16 @ 06:02)  Platelet Count - Automated: 291 K/uL (10-15 @ 09:09)  Platelet Count - Automated: 344 K/uL (10-14 @ 08:35)      LIVER FUNCTIONS - ( 16 Oct 2020 06:02 )  Alb: 2.4 g/dL / Pro: 5.9 g/dL / ALK PHOS: 61 U/L / ALT: 16 U/L / AST: 11 U/L / GGT: x                 RADIOLOGY & ADDITIONAL STUDIES:

## 2020-10-17 NOTE — PROGRESS NOTE ADULT - ATTENDING COMMENTS
I personally conducted a physical examination of the patient. I personally gathered the patient's history. I edited the above listed findings which were prepared by the listed resident physician. I personally discussed the plan of care with the patient. The questions and concerns were addressed to the best of my ability. The patient is in agreement with the listed treatment plan.    Patient seen and examined at bedside.  Overnight, patient complaining of LE edema, given bumex PO x1. This AM, patient still with LE swelling, Nephrology added bumex 0.5mg PO daily. States her breathing is the same, denies any CP, abd pain.

## 2020-10-17 NOTE — PROGRESS NOTE ADULT - ASSESSMENT
60F w/ end stage COPD on 3LNC (pending transplant list at [facility]), former smoker, CAD s/p stent (2016), Afib on Eliquis,  uncontrolled DM2 (on insulin), raynaud phenomenon and recent hospitalization at [facility] for confusion and AURORA p/w sob, admitted for COPD exacerbation and multifocal PNA    CAD s/p stent   - Continue asa, statin  - EKG showed SR @ 109, RBBB that is chronic  -resume home Bumex 1mg PO- 12 pound weight gain in one week as per patient + pitting edema noted on exam     Paroxysmal Afib  -NSR on tele no events overnigth   - Continue Eliquis 5mg  - Continue Cardizem CD at 240mg   - Monitor electrolytes, replete to keep K>4 and Mag>2  - TTE w/ mild concentric LVH grossly nL LVSF ef 55%, mild MR    End stage COPD/Pneumonia  - Pulm following.  Per note, patient is on waiting list for transplant  - CT chest noted  - Continue steroids and antibiotics  - Continue NC and BIPAP/CPAP prn/nocturnally  - Continue incentive spirometer; please reinforce     VTE ppx  - On Eliquis    - Monitor and replete lytes, keep K>4, Mg>2.  - All other medical needs as per primary team.  - Other cardiovascular workup will depend on clinical course.  - Will continue to follow.  Carlene George FNP-C  Cardiology NP  [id number] [phone]

## 2020-10-17 NOTE — PROGRESS NOTE ADULT - ASSESSMENT
60F w/ end stage COPD on 3LNC (trying to get on  transplant list at [facility]), former smoker, CAD s/p stent (2016), afib on eliquis, DM2 (on insulin), raynaud phenomenon and recent hospitalization at [facility] for confusion and AURORA p/w sob, admitted for acute on chronic resp failure with hypoxia and hyercarbia sec to multifocal PNA and COPD exacerabtion with end stage COPD.

## 2020-10-17 NOTE — PROGRESS NOTE ADULT - PROBLEM SELECTOR PLAN 3
Resolved  - likely 2/2 to prerenal vs hypoxemic  - resume bumetanide as needed  - trend renal indices  SPEP with gamma migrating protein seen -- HemeOnc consulted to r/o Multiple myeloma, MGUS; f/u recs

## 2020-10-17 NOTE — PROGRESS NOTE ADULT - SUBJECTIVE AND OBJECTIVE BOX
Patient is a 60y old  Female who presents with a chief complaint of COPD exacerbation, PNA (17 Oct 2020 12:23)      INTERVAL HPI/OVERNIGHT EVENTS: Patient seen and examined at bedside. Last night patient w/ 2+ pitting edema and was given bumex PO x1. Patient notes improvement in leg edema this AM. Patient notes no improvement in her SOB. Denies any HA, CP, n/v, or abdominal pain. Patient tolerating BIPAP well.     MEDICATIONS  (STANDING):  apixaban 5 milliGRAM(s) Oral every 12 hours  aspirin  chewable 81 milliGRAM(s) Oral daily  atorvastatin 80 milliGRAM(s) Oral at bedtime  azithromycin   Tablet 500 milliGRAM(s) Oral daily  Biotene Dry Mouth Oral Rinse 5 milliLiter(s) Swish and Spit two times a day  budesonide 160 MICROgram(s)/formoterol 4.5 MICROgram(s) Inhaler 2 Puff(s) Inhalation two times a day  buMETAnide 0.5 milliGRAM(s) Oral daily  cholecalciferol 1000 Unit(s) Oral daily  dextrose 5%. 1000 milliLiter(s) (50 mL/Hr) IV Continuous <Continuous>  dextrose 50% Injectable 12.5 Gram(s) IV Push once  dextrose 50% Injectable 25 Gram(s) IV Push once  dextrose 50% Injectable 25 Gram(s) IV Push once  diltiazem    milliGRAM(s) Oral daily  fentaNYL   Patch  12 MICROgram(s)/Hr 1 Patch Transdermal every 72 hours  ferrous    sulfate 325 milliGRAM(s) Oral daily  guaiFENesin  milliGRAM(s) Oral every 12 hours  insulin glargine Injectable (LANTUS) 12 Unit(s) SubCutaneous at bedtime  insulin lispro (HumaLOG) corrective regimen sliding scale   SubCutaneous three times a day before meals  insulin lispro (HumaLOG) corrective regimen sliding scale   SubCutaneous at bedtime  insulin lispro Injectable (HumaLOG) 4 Unit(s) SubCutaneous three times a day before meals  ipratropium    for Nebulization 500 MICROGram(s) Nebulizer every 6 hours  iron sucrose Injectable 100 milliGRAM(s) IV Push every 24 hours  montelukast 10 milliGRAM(s) Oral at bedtime  multivitamin 1 Tablet(s) Oral daily  pantoprazole    Tablet 40 milliGRAM(s) Oral before breakfast  polyethylene glycol 3350 17 Gram(s) Oral daily  predniSONE   Tablet 30 milliGRAM(s) Oral daily  senna 2 Tablet(s) Oral at bedtime  tiotropium 18 MICROgram(s) Capsule 1 Capsule(s) Inhalation daily    MEDICATIONS  (PRN):  acetaminophen   Tablet .. 650 milliGRAM(s) Oral every 6 hours PRN Mild Pain (1 - 3)  ALPRAZolam 0.25 milliGRAM(s) Oral every 8 hours PRN anxiety  bisacodyl Suppository 10 milliGRAM(s) Rectal daily PRN Constipation  dextrose 40% Gel 15 Gram(s) Oral once PRN Blood Glucose LESS THAN 70 milliGRAM(s)/deciliter  glucagon  Injectable 1 milliGRAM(s) IntraMuscular once PRN Glucose LESS THAN 70 milligrams/deciliter  lactulose Syrup 15 Gram(s) Oral two times a day PRN constipation      Allergies    No Known Allergies    Intolerances    albuterol (Unknown)      REVIEW OF SYSTEMS:  CONSTITUTIONAL: No fever or chills  HEENT:  No headache, no sore throat  RESPIRATORY: No cough, wheezing, Positive for shortness of breath  CARDIOVASCULAR: No chest pain, palpitations  GASTROINTESTINAL: No abd pain, nausea, vomiting, or diarrhea  GENITOURINARY: No dysuria, frequency, or hematuria  NEUROLOGICAL: no focal weakness or dizziness  MUSCULOSKELETAL: no myalgias     Vital Signs Last 24 Hrs  T(C): 36.4 (16 Oct 2020 20:39), Max: 36.4 (16 Oct 2020 20:39)  T(F): 97.6 (16 Oct 2020 20:39), Max: 97.6 (16 Oct 2020 20:39)  HR: 75 (17 Oct 2020 05:37) (71 - 79)  BP: 175/61 (17 Oct 2020 05:37) (153/77 - 175/61)  BP(mean): --  RR: 22 (17 Oct 2020 05:37) (22 - 22)  SpO2: 95% (17 Oct 2020 05:37) (95% - 100%)    PHYSICAL EXAM:  GENERAL: NAD, laying in bed comfortably, on BIPAP machine.  HEENT:  anicteric, moist mucous membranes  CHEST/LUNG:  Decreased breath sounds in all fields, no rales, wheezes, or rhonchi  HEART:  RRR, S1, S2  ABDOMEN:  BS+, soft, nontender, nondistended  EXTREMITIES: Trace edema. No cyanosis, or calf tenderness  NERVOUS SYSTEM: answers questions and follows commands appropriately    LABS:                        9.9    15.00 )-----------( 350      ( 17 Oct 2020 12:11 )             33.4     CBC Full  -  ( 17 Oct 2020 12:11 )  WBC Count : 15.00 K/uL  Hemoglobin : 9.9 g/dL  Hematocrit : 33.4 %  Platelet Count - Automated : 350 K/uL  Mean Cell Volume : 81.5 fl  Mean Cell Hemoglobin : 24.1 pg  Mean Cell Hemoglobin Concentration : 29.6 gm/dL  Auto Neutrophil # : 13.66 K/uL  Auto Lymphocyte # : 0.70 K/uL  Auto Monocyte # : 0.35 K/uL  Auto Eosinophil # : 0.06 K/uL  Auto Basophil # : 0.02 K/uL  Auto Neutrophil % : 91.1 %  Auto Lymphocyte % : 4.7 %  Auto Monocyte % : 2.3 %  Auto Eosinophil % : 0.4 %  Auto Basophil % : 0.1 %      Ca    9.0        16 Oct 2020 06:02          CAPILLARY BLOOD GLUCOSE      POCT Blood Glucose.: 182 mg/dL (17 Oct 2020 12:10)  POCT Blood Glucose.: 241 mg/dL (17 Oct 2020 08:19)  POCT Blood Glucose.: 220 mg/dL (16 Oct 2020 21:05)  POCT Blood Glucose.: 129 mg/dL (16 Oct 2020 17:04)        Culture - Sputum (collected 10-12-20 @ 16:44)  Source: .Sputum Sputum  Gram Stain (10-12-20 @ 19:22):    No polymorphonuclear leukocytes per low power field    Rare Squamous epithelial cells per low power field    Few Gram Negative Rods per oil power field    Rare Gram positive cocci in pairs per oil power field  Preliminary Report (10-14-20 @ 18:35):    Rare Mold like fungus    Normal Respiratory Samaria present        RADIOLOGY & ADDITIONAL TESTS:    Personally reviewed.     Consultant(s) Notes Reviewed:  [x] YES  [ ] NO

## 2020-10-17 NOTE — PROGRESS NOTE ADULT - PROBLEM SELECTOR PLAN 6
recent TTE in 08/2020 showing normal LVEF, stage 1 diastolic dysfunction  - TTE (10/6/20) LVEF of 55%   - home bumex initially held 2/2 to [facility]  - Patient required bumex PO last night due to signs of overload  - will add bumex 0.5mg qd as per nephro recs  - caution with excessive IVF

## 2020-10-18 LAB
ALBUMIN SERPL ELPH-MCNC: 2.7 G/DL — LOW (ref 3.3–5)
ALP SERPL-CCNC: 69 U/L — SIGNIFICANT CHANGE UP (ref 40–120)
ALT FLD-CCNC: 20 U/L — SIGNIFICANT CHANGE UP (ref 12–78)
ANION GAP SERPL CALC-SCNC: 4 MMOL/L — LOW (ref 5–17)
AST SERPL-CCNC: 14 U/L — LOW (ref 15–37)
BILIRUB SERPL-MCNC: 0.4 MG/DL — SIGNIFICANT CHANGE UP (ref 0.2–1.2)
BUN SERPL-MCNC: 34 MG/DL — HIGH (ref 7–23)
CALCIUM SERPL-MCNC: 9.3 MG/DL — SIGNIFICANT CHANGE UP (ref 8.5–10.1)
CHLORIDE SERPL-SCNC: 96 MMOL/L — SIGNIFICANT CHANGE UP (ref 96–108)
CO2 SERPL-SCNC: 40 MMOL/L — HIGH (ref 22–31)
CREAT SERPL-MCNC: 1 MG/DL — SIGNIFICANT CHANGE UP (ref 0.5–1.3)
GLUCOSE SERPL-MCNC: 167 MG/DL — HIGH (ref 70–99)
IGE SERPL-ACNC: 65 KU/L — SIGNIFICANT CHANGE UP
POTASSIUM SERPL-MCNC: 4 MMOL/L — SIGNIFICANT CHANGE UP (ref 3.5–5.3)
POTASSIUM SERPL-SCNC: 4 MMOL/L — SIGNIFICANT CHANGE UP (ref 3.5–5.3)
PROT SERPL-MCNC: 6.9 G/DL — SIGNIFICANT CHANGE UP (ref 6–8.3)
SODIUM SERPL-SCNC: 140 MMOL/L — SIGNIFICANT CHANGE UP (ref 135–145)

## 2020-10-18 PROCEDURE — 99232 SBSQ HOSP IP/OBS MODERATE 35: CPT | Mod: GC

## 2020-10-18 PROCEDURE — 99232 SBSQ HOSP IP/OBS MODERATE 35: CPT

## 2020-10-18 RX ADMIN — Medication 1000 UNIT(S): at 12:46

## 2020-10-18 RX ADMIN — Medication 600 MILLIGRAM(S): at 06:50

## 2020-10-18 RX ADMIN — POLYETHYLENE GLYCOL 3350 17 GRAM(S): 17 POWDER, FOR SOLUTION ORAL at 12:43

## 2020-10-18 RX ADMIN — Medication 4 UNIT(S): at 17:49

## 2020-10-18 RX ADMIN — BUDESONIDE AND FORMOTEROL FUMARATE DIHYDRATE 2 PUFF(S): 160; 4.5 AEROSOL RESPIRATORY (INHALATION) at 20:36

## 2020-10-18 RX ADMIN — Medication 0.25 MILLIGRAM(S): at 06:48

## 2020-10-18 RX ADMIN — Medication 500 MICROGRAM(S): at 19:44

## 2020-10-18 RX ADMIN — Medication 600 MILLIGRAM(S): at 17:49

## 2020-10-18 RX ADMIN — Medication 500 MICROGRAM(S): at 09:41

## 2020-10-18 RX ADMIN — AZITHROMYCIN 500 MILLIGRAM(S): 500 TABLET, FILM COATED ORAL at 12:46

## 2020-10-18 RX ADMIN — Medication 6: at 12:44

## 2020-10-18 RX ADMIN — Medication 325 MILLIGRAM(S): at 12:47

## 2020-10-18 RX ADMIN — BUMETANIDE 0.5 MILLIGRAM(S): 0.25 INJECTION INTRAMUSCULAR; INTRAVENOUS at 06:49

## 2020-10-18 RX ADMIN — IRON SUCROSE 100 MILLIGRAM(S): 20 INJECTION, SOLUTION INTRAVENOUS at 12:47

## 2020-10-18 RX ADMIN — BUDESONIDE AND FORMOTEROL FUMARATE DIHYDRATE 2 PUFF(S): 160; 4.5 AEROSOL RESPIRATORY (INHALATION) at 06:55

## 2020-10-18 RX ADMIN — SENNA PLUS 2 TABLET(S): 8.6 TABLET ORAL at 21:31

## 2020-10-18 RX ADMIN — Medication 240 MILLIGRAM(S): at 06:49

## 2020-10-18 RX ADMIN — Medication 4 UNIT(S): at 08:58

## 2020-10-18 RX ADMIN — APIXABAN 5 MILLIGRAM(S): 2.5 TABLET, FILM COATED ORAL at 06:50

## 2020-10-18 RX ADMIN — Medication 500 MICROGRAM(S): at 14:48

## 2020-10-18 RX ADMIN — MONTELUKAST 10 MILLIGRAM(S): 4 TABLET, CHEWABLE ORAL at 21:31

## 2020-10-18 RX ADMIN — Medication 2: at 17:49

## 2020-10-18 RX ADMIN — Medication 1 TABLET(S): at 13:23

## 2020-10-18 RX ADMIN — Medication 4 UNIT(S): at 12:43

## 2020-10-18 RX ADMIN — APIXABAN 5 MILLIGRAM(S): 2.5 TABLET, FILM COATED ORAL at 17:49

## 2020-10-18 RX ADMIN — Medication 30 MILLIGRAM(S): at 06:49

## 2020-10-18 RX ADMIN — ATORVASTATIN CALCIUM 80 MILLIGRAM(S): 80 TABLET, FILM COATED ORAL at 21:31

## 2020-10-18 RX ADMIN — Medication 81 MILLIGRAM(S): at 12:47

## 2020-10-18 RX ADMIN — Medication 2: at 08:57

## 2020-10-18 RX ADMIN — TIOTROPIUM BROMIDE 1 CAPSULE(S): 18 CAPSULE ORAL; RESPIRATORY (INHALATION) at 21:30

## 2020-10-18 RX ADMIN — INSULIN GLARGINE 12 UNIT(S): 100 INJECTION, SOLUTION SUBCUTANEOUS at 21:32

## 2020-10-18 RX ADMIN — PANTOPRAZOLE SODIUM 40 MILLIGRAM(S): 20 TABLET, DELAYED RELEASE ORAL at 06:49

## 2020-10-18 NOTE — PROGRESS NOTE ADULT - SUBJECTIVE AND OBJECTIVE BOX
Interval History:  tolerating venofer without complication.. breathing better  Chart reviewed and events noted;   Overnight events:    MEDICATIONS  (STANDING):  apixaban 5 milliGRAM(s) Oral every 12 hours  aspirin  chewable 81 milliGRAM(s) Oral daily  atorvastatin 80 milliGRAM(s) Oral at bedtime  azithromycin   Tablet 500 milliGRAM(s) Oral daily  Biotene Dry Mouth Oral Rinse 5 milliLiter(s) Swish and Spit two times a day  budesonide 160 MICROgram(s)/formoterol 4.5 MICROgram(s) Inhaler 2 Puff(s) Inhalation two times a day  buMETAnide 0.5 milliGRAM(s) Oral daily  cholecalciferol 1000 Unit(s) Oral daily  dextrose 5%. 1000 milliLiter(s) (50 mL/Hr) IV Continuous <Continuous>  dextrose 50% Injectable 12.5 Gram(s) IV Push once  dextrose 50% Injectable 25 Gram(s) IV Push once  dextrose 50% Injectable 25 Gram(s) IV Push once  diltiazem    milliGRAM(s) Oral daily  ferrous    sulfate 325 milliGRAM(s) Oral daily  guaiFENesin  milliGRAM(s) Oral every 12 hours  insulin glargine Injectable (LANTUS) 12 Unit(s) SubCutaneous at bedtime  insulin lispro (HumaLOG) corrective regimen sliding scale   SubCutaneous three times a day before meals  insulin lispro (HumaLOG) corrective regimen sliding scale   SubCutaneous at bedtime  insulin lispro Injectable (HumaLOG) 4 Unit(s) SubCutaneous three times a day before meals  ipratropium    for Nebulization 500 MICROGram(s) Nebulizer every 6 hours  iron sucrose Injectable 100 milliGRAM(s) IV Push every 24 hours  montelukast 10 milliGRAM(s) Oral at bedtime  multivitamin 1 Tablet(s) Oral daily  pantoprazole    Tablet 40 milliGRAM(s) Oral before breakfast  polyethylene glycol 3350 17 Gram(s) Oral daily  predniSONE   Tablet 30 milliGRAM(s) Oral daily  senna 2 Tablet(s) Oral at bedtime  tiotropium 18 MICROgram(s) Capsule 1 Capsule(s) Inhalation daily    MEDICATIONS  (PRN):  acetaminophen   Tablet .. 650 milliGRAM(s) Oral every 6 hours PRN Mild Pain (1 - 3)  ALPRAZolam 0.25 milliGRAM(s) Oral every 8 hours PRN anxiety  bisacodyl Suppository 10 milliGRAM(s) Rectal daily PRN Constipation  dextrose 40% Gel 15 Gram(s) Oral once PRN Blood Glucose LESS THAN 70 milliGRAM(s)/deciliter  glucagon  Injectable 1 milliGRAM(s) IntraMuscular once PRN Glucose LESS THAN 70 milligrams/deciliter  lactulose Syrup 15 Gram(s) Oral two times a day PRN constipation      Vital Signs Last 24 Hrs  T(C): 37 (18 Oct 2020 12:43), Max: 37 (18 Oct 2020 12:43)  T(F): 98.6 (18 Oct 2020 12:43), Max: 98.6 (18 Oct 2020 12:43)  HR: 70 (18 Oct 2020 19:21) (70 - 82)  BP: 121/81 (18 Oct 2020 12:43) (121/81 - 163/72)  BP(mean): --  RR: 20 (18 Oct 2020 12:43) (19 - 20)  SpO2: 100% (18 Oct 2020 12:43) (99% - 100%)    PHYSICAL EXAM  General: adult NAD with bipap in place  HEENT: clear oropharynx, anicteric sclera, pink conjunctivae  Neck: supple  CV: normal S1S2 with no murmur rubs or gallops  Lungs: clear to auscultation, no wheezes, no rhales  Abdomen: soft non-tender non-distended, no hepato/splenomegaly  Ext: no clubbing cyanosis or edema  Skin: no rashes and no petichiae  Neuro: alert and oriented X3 no focal deficits      LABS:  CBC Full  -  ( 18 Oct 2020 09:51 )  WBC Count : 12.45 K/uL  RBC Count : 4.00 M/uL  Hemoglobin : 9.9 g/dL  Hematocrit : 32.5 %  Platelet Count - Automated : 322 K/uL  Mean Cell Volume : 81.3 fl  Mean Cell Hemoglobin : 24.8 pg  Mean Cell Hemoglobin Concentration : 30.5 gm/dL  Auto Neutrophil # : 10.89 K/uL  Auto Lymphocyte # : 0.62 K/uL  Auto Monocyte # : 0.53 K/uL  Auto Eosinophil # : 0.23 K/uL  Auto Basophil # : 0.03 K/uL  Auto Neutrophil % : 87.5 %  Auto Lymphocyte % : 5.0 %  Auto Monocyte % : 4.3 %  Auto Eosinophil % : 1.8 %  Auto Basophil % : 0.2 %    10-18    140  |  96  |  34<H>  ----------------------------<  167<H>  4.0   |  40<H>  |  1.00    Ca    9.3      18 Oct 2020 09:51    TPro  6.9  /  Alb  2.7<L>  /  TBili  0.4  /  DBili  x   /  AST  14<L>  /  ALT  20  /  AlkPhos  69  10-18        fe studies      WBC trend  12.45 K/uL (10-18-20 @ 09:51)  15.00 K/uL (10-17-20 @ 12:11)  9.28 K/uL (10-16-20 @ 06:02)      Hgb trend  9.9 g/dL (10-18-20 @ 09:51)  9.9 g/dL (10-17-20 @ 12:11)  8.6 g/dL (10-16-20 @ 06:02)      plt trend  322 K/uL (10-18-20 @ 09:51)  350 K/uL (10-17-20 @ 12:11)  312 K/uL (10-16-20 @ 06:02)        RADIOLOGY & ADDITIONAL STUDIES:

## 2020-10-18 NOTE — PROGRESS NOTE ADULT - PROBLEM SELECTOR PLAN 2
on 3LNC at rehab, currently requiring 5L NC with BIPAP, wean as tolerated  - continue spiriva, symbicort, montelukast, inhaler PRN, atrovent   -theophylline held given tachycardia and brief afib  - BIPAP qhs and PRN -- pt counseled to use more frequently   -of note pt is not on transplant list yet, is planning to go on list at [facility] if medically stable and improved

## 2020-10-18 NOTE — PROGRESS NOTE ADULT - SUBJECTIVE AND OBJECTIVE BOX
Patient is a 60y old  Female who presents with a chief complaint of COPD exacerbation, PNA (08 Oct 2020 11:37)       HPI:  60F w/ end stage COPD on 3LNC (pending transplant list at [facility]), former smoker, CAD s/p stent (2016), afib on eliquis, uncontrolled DM2 (on insulin), raynaud phenomenon and recent hospitalization at [facility] for confusion and [facility] Patient states that she has had worsening shortness of breath for past two days. Unable to obtain comprehensive history due to dyspnea. Patient denies fever, chills, sore throat, cough, chest pain, palpitations, abd pain, n/v. Currently feels short of breath even after receiving duonebs and steroids in the ED. Unable to keep mask on during interview and lay back in stretcher due to subjective sob. Patient repeatedly stating that it is too hot in her room and fanning herself with a paper. Per chart, Dr. Mathew (PCP) has chart notes regarding possible hallucinations. Would like her home medications now but otherwise has no acute complaints.  Has not seen nephrologist.  Denies any N/V.  SOB improving.  States she is urinating well.  Denies any urinary retention.  Denies any supplement or NSAID usage.         Still has SOB and NIELSON, c/o edema    PAST MEDICAL & SURGICAL HISTORY:  H/O Raynaud&#x27;s syndrome    Ascorbic acid deficiency    Iron deficiency anemia    Constipation    GERD without esophagitis    Type 2 diabetes mellitus    HTN (hypertension)    Heart failure    HLD (hyperlipidemia)    History of chronic atrial fibrillation  on Eliquis    End stage COPD  on 3LNC    Atrial fibrillation    Hyperlipemia    Chronic sinusitis    Raynaud phenomenon    HTN (hypertension)    DM (diabetes mellitus)    Claustrophobia    COPD (chronic obstructive pulmonary disease)    History of tonsillectomy    S/P tonsillectomy         FAMILY HISTORY:  FH: myocardial infarction    Family history of CABG    FH: MI (myocardial infarction)    FH: CABG (coronary artery bypass surgery)    NC    Social History:Non smoker    MEDICATIONS  (STANDING):  apixaban 5 milliGRAM(s) Oral every 12 hours  ascorbic acid 500 milliGRAM(s) Oral daily  aspirin  chewable 81 milliGRAM(s) Oral daily  atorvastatin 80 milliGRAM(s) Oral at bedtime  azithromycin  IVPB 500 milliGRAM(s) IV Intermittent every 24 hours  budesonide 160 MICROgram(s)/formoterol 4.5 MICROgram(s) Inhaler 2 Puff(s) Inhalation two times a day  cefepime   IVPB 2000 milliGRAM(s) IV Intermittent every 12 hours  cholecalciferol 1000 Unit(s) Oral daily  dextrose 5%. 1000 milliLiter(s) (50 mL/Hr) IV Continuous <Continuous>  dextrose 50% Injectable 12.5 Gram(s) IV Push once  dextrose 50% Injectable 25 Gram(s) IV Push once  dextrose 50% Injectable 25 Gram(s) IV Push once  ferrous    sulfate 325 milliGRAM(s) Oral daily  insulin glargine Injectable (LANTUS) 10 Unit(s) SubCutaneous at bedtime  insulin lispro (HumaLOG) corrective regimen sliding scale   SubCutaneous three times a day before meals  insulin lispro Injectable (HumaLOG) 3 Unit(s) SubCutaneous three times a day before meals  montelukast 10 milliGRAM(s) Oral at bedtime  multivitamin 1 Tablet(s) Oral daily  pantoprazole    Tablet 40 milliGRAM(s) Oral before breakfast  polyethylene glycol 3350 17 Gram(s) Oral daily  potassium chloride    Tablet ER 40 milliEquivalent(s) Oral every 4 hours  senna 2 Tablet(s) Oral at bedtime  tiotropium 18 MICROgram(s) Capsule 1 Capsule(s) Inhalation daily    MEDICATIONS  (PRN):  acetaminophen   Tablet .. 650 milliGRAM(s) Oral every 6 hours PRN Mild Pain (1 - 3)  bisacodyl Suppository 10 milliGRAM(s) Rectal daily PRN Constipation  dextrose 40% Gel 15 Gram(s) Oral once PRN Blood Glucose LESS THAN 70 milliGRAM(s)/deciliter  glucagon  Injectable 1 milliGRAM(s) IntraMuscular once PRN Glucose LESS THAN 70 milligrams/deciliter  guaiFENesin   Syrup  (Sugar-Free) 100 milliGRAM(s) Oral every 6 hours PRN Cough  lactulose Syrup 15 Gram(s) Oral two times a day PRN constipation  levalbuterol Inhalation 0.63 milliGRAM(s) Inhalation every 4 hours PRN shortness of breath and/or wheezing  oxyCODONE    IR 5 milliGRAM(s) Oral every 6 hours PRN Moderate Pain (4 - 6)   Meds reviewed    Allergies    No Known Allergies    Intolerances    albuterol (Unknown)       REVIEW OF SYSTEMS:    CONSTITUTIONAL:  No weight loss   EYES: No eye pain, visual disturbances, or discharge  ENMT:  No difficulty hearing, tinnitus, vertigo; No sinus or throat pain  NECK: No pain or stiffness  BREASTS: No pain, masses, or nipple discharge  RESPIRATORY: +SOB. +NIELSON  CARDIOVASCULAR: No chest pain, palpitations, dizziness,   GASTROINTESTINAL: No abdominal or epigastric pain. No nausea, vomiting, or hematemesis;  GENITOURINARY: No dysuria, frequency, hematuria, or incontinence  NEUROLOGICAL: No headaches, memory loss, loss of strength, numbness, or tremors  SKIN: no Diffuse erythema, no blisters  LYMPH NODES: No enlarged glands  ENDOCRINE: No heat or cold intolerance; No hair loss  MUSCULOSKELETAL: No joint pain or swelling   PSYCHIATRIC: No depression, anxiety, mood swings, or difficulty sleeping  ALLERGY AND IMMUNOLOGIC: No hives or eczema    ICU Vital Signs Last 24 Hrs  T(C): 36.7 (17 Oct 2020 22:17), Max: 36.7 (17 Oct 2020 13:06)  T(F): 98.1 (17 Oct 2020 22:17), Max: 98.1 (17 Oct 2020 13:06)  HR: 82 (17 Oct 2020 22:17) (73 - 86)  BP: 163/72 (17 Oct 2020 22:17) (122/69 - 163/72)  BP(mean): --  ABP: --  ABP(mean): --  RR: 19 (17 Oct 2020 22:17) (19 - 20)  SpO2: 99% (17 Oct 2020 22:17) (98% - 99%)              PHYSICAL EXAM:    GENERAL: No distress  HEAD:  Atraumatic, Normocephalic  EYES: EOMI, conjunctiva and sclera clear  ENMT: No drainage from nares, no drainage from pinna  NECK: Supple, neck  veins full  NERVOUS SYSTEM:  Alert & Oriented X3, Good concentration; Motor Strength wnl upper and lower extremities  CHEST/LUNG: Decreased BS,no rales, no rhonchi, Mild wheezing  HEART: Regular rate and rhythm; No murmurs, rubs, or gallops  ABDOMEN: Soft, Nontender, Nondistended; Bowel sounds present,  EXTREMITIES: trace Edema  SKIN: No rashes or lesions, pale      LABS:                                              ICU Vital Signs Last 24 Hrs  T(C): 36.7 (17 Oct 2020 22:17), Max: 36.7 (17 Oct 2020 13:06)  T(F): 98.1 (17 Oct 2020 22:17), Max: 98.1 (17 Oct 2020 13:06)  HR: 82 (17 Oct 2020 22:17) (73 - 86)  BP: 163/72 (17 Oct 2020 22:17) (122/69 - 163/72)  BP(mean): --  ABP: --  ABP(mean): --  RR: 19 (17 Oct 2020 22:17) (19 - 20)  SpO2: 99% (17 Oct 2020 22:17) (98% - 99%)        ABG - ( 16 Oct 2020 11:26 )  pH, Arterial: 7.38  pH, Blood: x     /  pCO2: 57    /  pO2: 70    / HCO3: 30    / Base Excess: 7.3   /  SaO2: 93

## 2020-10-18 NOTE — PROGRESS NOTE ADULT - ATTENDING COMMENTS
I personally conducted a physical examination of the patient. I personally gathered the patient's history. I edited the above listed findings which were prepared by the listed resident physician. I personally discussed the plan of care with the patient. The questions and concerns were addressed to the best of my ability. The patient is in agreement with the listed treatment plan.    Patient seen and examined. States she feels a bit better today, able to get to the commode easier, but overall still weak. F/U aspergillus antigen

## 2020-10-18 NOTE — PROGRESS NOTE ADULT - ASSESSMENT
cont rx   cont rx      MINNIE ALONZO  DOA 10/6/2020 DR JASON WALKER            REVIEW OF SYMPTOMS      Able to give ROS  Yes     RELIABLE No   CONSTITUTIONAL Weakness Yes  Chills No Vision changes No  ENDOCRINE No unexplained hair loss No heat or cold intolerance    ALLERGY No hives  Sore throat No   RESP Coughing blood no  Shortness of breath YES   NEURO No Headache  Confusion Pain neck No   CARDIAC No Chest pain No Palpitations   GI No Pain abdomen NO   Vomiting NO     PHYSICAL EXAM    HEENT Unremarkable PERRLA atraumatic   RESP Fair air entry EXP prolonged    Harsh breath sound Resp distres mild   CARDIAC S1 S2 No S3     NO JVD    ABDOMEN SOFT BS PRESENT NOT DISTENDED No hepatosplenomegaly PEDAL EDEMA present No calf tenderness  NO rash   GENERAL Not TOXIC looking    PT DATA/BEST PRACTICE  ALLERGY        albuterol      WT                  10/6/2020 76              BMI                   10/6/2020 28                       ADVANCED DIRECTIVE     HEAD OF BED ELEVATION Yes      DVT PROPHYLAXIS.     Apixaban 5.2 (10/5)  (a f)   DIET. dash cons carb (10/9)                                                                          FUNEZ PROPHYLAXIS. protonix 40 (10/6)   INFECTION PPLX                                                    OXYGENATION.   10/15-10/16/2020 5l 96%- 5l 100%       VITALS.   10/18/2020 afeb 76 120/80       PATIENT SUMMARY.   60F w/ end stage COPD on 3LNC (trying to get on  transplant list at [facility]), former smoker, CAD s/p stent (2016), afib on eliquis, uncontrolled DM2 (on insulin), raynaud phenomenon and recent hospitalization at [facility] for confusion and [facility] p/w sob, admitted for acute on chronic resp failure with hypoxia and hyercarbia sec to multifocal PNA and COPD exacerbation with end stage COPD.   Dr Rojas asked me to cover pulm 10/15-10/18/2020     PROBLEM/ASSESSMENT/RECOMMENDATIONS.  GAS EXCHANGE   10/16/2020 3.5l  738/57/71  Pt has ho hypercapnic resp failure Avoid excess oxygen Keep po 90-95% range  ACID BASE   Chronic resp acidosis with superimposed metabolic acidosis sec copd and diuretics   AG decreased sec paraproteins   PNEUMONIA Is on azithro (rocephindced 10/16/2020)  As sputum showed fungus will check galactomannan and ige to look for abpa   NOTE eosinophils sl elevated despite steroids aec 380 on 10/16  COPD On bs steroids Is intoleratnt to albuterol but is on laba lama ics and po steroids Cont rx  Rehab for deconditioned  CHF ECHO 10/6/2020 echo ef 55% pasp 15 dd   May have element of d chf No PH Bumetanide .5 started 10/17/2020 by renal   VTE Unlikely as sshe is on doac  A fib On apixaban cardizem Rate controlled anticoagulated cont rx  ANEMIA Likely acd Monitor for drop in Hb Target Hb 7 (+)    TIME SPENT   Over 25 minutes aggregate care time spent on encounter; activities included   direct patient care, counseling and/or coordinating care reviewing notes, lab data/ imaging , discussion with multidisciplinary team/ patient  /family and explaining in detail risks, benefits, alternatives  of the recommendations     MINNIE ALONZO  DOA 10/6/2020 DR JASON WALKER

## 2020-10-18 NOTE — PROGRESS NOTE ADULT - ASSESSMENT
60F w/ end stage COPD on 3LNC (pending transplant list at [facility]), former smoker, CAD s/p stent (2016), Afib on Eliquis,  uncontrolled DM2 (on insulin), raynaud phenomenon and recent hospitalization at [facility] for confusion and [facility] p/w sob, admitted for COPD exacerbation and multifocal PNA    CAD s/p stent   - No clinical evidnce of ACS  - Continue asa and statin  - EKG showed SR @ 109, RBBB that is chronic  -resume home Bumex 1mg PO- 12 pound weight gain in one week as per patient + pitting edema noted on exam     Paroxysmal Afib  -  Remains NSR on tele with no events   - Continue Eliquis 5mg  - Continue Cardizem CD at 240mg   - Monitor electrolytes, replete to keep K>4 and Mag>2  - TTE w/ mild concentric LVH grossly nL LVSF ef 55%, mild MR    End stage COPD/Pneumonia  - Pulm following.  Per note, patient is on waiting list for transplant  - CT chest noted  - Continue steroids and antibiotics per Pulm  - Continue NC and BIPAP/CPAP prn/nocturnally  - Continue to encourage incentive spirometer    VTE ppx  - On Eliquis    - All other medical needs as per primary team.  - Other cardiovascular workup will depend on clinical course.  - Will continue to follow.    Jovita Jones DNP, NP-C  Cardiology   Spectra #[phone]   60F w/ end stage COPD on 3LNC (pending transplant list at [facility]), former smoker, CAD s/p stent (2016), Afib on Eliquis,  uncontrolled DM2 (on insulin), raynaud phenomenon and recent hospitalization at [facility] for confusion and [facility] p/w sob, admitted for COPD exacerbation and multifocal PNA    CAD s/p stent   - No clinical evidnce of ACS  - Continue asa and statin  - EKG showed SR @ 109, RBBB that is chronic  - resume home Bumex 1mg PO- 12 pound weight gain in one week as per patient + pitting edema noted on exam     Paroxysmal Afib  -  Remains NSR on tele with no events   - Continue Eliquis 5mg  - Continue Cardizem CD at 240mg   - Monitor electrolytes, replete to keep K>4 and Mag>2  - TTE w/ mild concentric LVH grossly nL LVSF ef 55%, mild MR    End stage COPD/Pneumonia  - Pulm following.  Per note, patient is on waiting list for transplant  - CT chest noted  - Continue steroids and antibiotics per Pulm  - Continue NC and BIPAP/CPAP prn/nocturnally  - Continue to encourage incentive spirometer    VTE ppx  - On Eliquis    - All other medical needs as per primary team.  - Other cardiovascular workup will depend on clinical course.  - Will continue to follow.    Jovita Jones DNP, NP-C  Cardiology   Spectra #[phone]

## 2020-10-18 NOTE — PROGRESS NOTE ADULT - ASSESSMENT
[ASSESSMENT and  PLAN]  60F w/ end stage COPD on 3LNC (trying to get on  transplant list at [facility]), former smoker, CAD s/p stent (2016), afib on eliquis, DM2 (on insulin), raynaud phenomenon and recent hospitalization at [facility] for confusion and AURORA p/w sob, admitted for acute on chronic resp failure with hypoxia and hyercarbia sec to multifocal PNA and COPD exacerabtion with end stage COPD.     Being evaluated for lung transplant.   had lost weight.     {[id number]}Acute on chronic respiratory failure with hypoxia and hypercapnia.  Plan: likely multifactorial from multifocal PNA in setting of end stage COPD     Now noted to have MGUS.   IgG-Lambda monoclonal protein.   SPEP with faint M spike of 0.1g/dL    anemia due to chronic disease with unremarkable fe studies with ferritin 98    RECOMMENDATIONS  Tx of pneumonia per pulmonary and ID.   Recommend repeat MGUS studies outpt.   As pt undergoing txplant evaluation, to have re-eval with hematologist associated with transplant center.   Unclear if faint band may disappear on own post infection or may persisit.     Limited PRBC transfusion to only necessary transfusion, to prevent alloimmunization.   Follow CBC    DVT Prophyalxis  On Apixiban    followup CBC.    to continue venofer  continue med/pulmonary management

## 2020-10-18 NOTE — PROGRESS NOTE ADULT - ASSESSMENT
60F w/ end stage COPD on 3LNC (trying to get on  transplant list at [facility]), former smoker, CAD s/p stent (2016), afib on eliquis, DM2 (on insulin), raynaud phenomenon and recent hospitalization at [facility] for confusion and AURORA p/w sob, admitted for acute on chronic resp failure with hypoxia and hyercarbia sec to multifocal PNA and COPD exacerabtion with end stage COPD.

## 2020-10-18 NOTE — PROGRESS NOTE ADULT - ASSESSMENT
AURORA on CKD 2  Hyponatremia  Hypokalemia  COPD  Hypercalcemia  Metabolic Alkalosis     -BLCr 1  -AURORA unclear etiology, clinically ATN  -UA - 3-5 WBC, trace ketones, 30 proteins  -FeUrea 34.6%  -UPC 0.44  -Ur osm 454  -Corrected calcium 10.2, with paraprotein gap and anemia; FLC ratio is normal; SPEP with gamma migrating protein seen  -Iron repletion   -Metformin on hold  -Renal indices are stable at baseline; Bumex restarted-tolerating renally  -Less likely AIN given paucity of WBC on UA and no peripheral eosinophils  -No IVF needed at present  -Strict I&Os  -Pulm follow up noted  -Heme/onc note reviewed.  Possible MGUS, repeat studies as outpatient  -Alkalosis likely 2/2 compensation for respiratory acidosis

## 2020-10-18 NOTE — PROGRESS NOTE ADULT - SUBJECTIVE AND OBJECTIVE BOX
[facility] -- Adriana Gonzales, Gina, Funmilayo, Dylan Cormier Savella, Goodger  Office # [phone]    Follow Up:  SOB, Afib    Subjective/Observations: Awake on NC.  Admits to be coughing less but states, she needs to wear her CPAP every so often during the day.  Denies CP or palpitations    REVIEW OF SYSTEMS: All other review of systems is negative unless indicated above  PAST MEDICAL & SURGICAL HISTORY:  Ascorbic acid deficiency    Iron deficiency anemia    Constipation    GERD without esophagitis    Type 2 diabetes mellitus    HTN (hypertension)    Heart failure    End stage COPD  on 3LNC    Atrial fibrillation    Hyperlipemia    Chronic sinusitis    Raynaud phenomenon    Claustrophobia    COPD (chronic obstructive pulmonary disease)    S/P tonsillectomy    MEDICATIONS  (STANDING):  apixaban 5 milliGRAM(s) Oral every 12 hours  aspirin  chewable 81 milliGRAM(s) Oral daily  atorvastatin 80 milliGRAM(s) Oral at bedtime  azithromycin   Tablet 500 milliGRAM(s) Oral daily  Biotene Dry Mouth Oral Rinse 5 milliLiter(s) Swish and Spit two times a day  budesonide 160 MICROgram(s)/formoterol 4.5 MICROgram(s) Inhaler 2 Puff(s) Inhalation two times a day  buMETAnide 0.5 milliGRAM(s) Oral daily  cholecalciferol 1000 Unit(s) Oral daily  dextrose 5%. 1000 milliLiter(s) (50 mL/Hr) IV Continuous <Continuous>  dextrose 50% Injectable 12.5 Gram(s) IV Push once  dextrose 50% Injectable 25 Gram(s) IV Push once  dextrose 50% Injectable 25 Gram(s) IV Push once  diltiazem    milliGRAM(s) Oral daily  fentaNYL   Patch  12 MICROgram(s)/Hr 1 Patch Transdermal every 72 hours  ferrous    sulfate 325 milliGRAM(s) Oral daily  guaiFENesin  milliGRAM(s) Oral every 12 hours  insulin glargine Injectable (LANTUS) 12 Unit(s) SubCutaneous at bedtime  insulin lispro (HumaLOG) corrective regimen sliding scale   SubCutaneous three times a day before meals  insulin lispro (HumaLOG) corrective regimen sliding scale   SubCutaneous at bedtime  insulin lispro Injectable (HumaLOG) 4 Unit(s) SubCutaneous three times a day before meals  ipratropium    for Nebulization 500 MICROGram(s) Nebulizer every 6 hours  iron sucrose Injectable 100 milliGRAM(s) IV Push every 24 hours  montelukast 10 milliGRAM(s) Oral at bedtime  multivitamin 1 Tablet(s) Oral daily  pantoprazole    Tablet 40 milliGRAM(s) Oral before breakfast  polyethylene glycol 3350 17 Gram(s) Oral daily  predniSONE   Tablet 30 milliGRAM(s) Oral daily  senna 2 Tablet(s) Oral at bedtime  tiotropium 18 MICROgram(s) Capsule 1 Capsule(s) Inhalation daily    MEDICATIONS  (PRN):  acetaminophen   Tablet .. 650 milliGRAM(s) Oral every 6 hours PRN Mild Pain (1 - 3)  ALPRAZolam 0.25 milliGRAM(s) Oral every 8 hours PRN anxiety  bisacodyl Suppository 10 milliGRAM(s) Rectal daily PRN Constipation  dextrose 40% Gel 15 Gram(s) Oral once PRN Blood Glucose LESS THAN 70 milliGRAM(s)/deciliter  glucagon  Injectable 1 milliGRAM(s) IntraMuscular once PRN Glucose LESS THAN 70 milligrams/deciliter  lactulose Syrup 15 Gram(s) Oral two times a day PRN constipation    Allergies    No Known Allergies    Intolerances    albuterol (Unknown)    Vital Signs Last 24 Hrs  T(C): 36.7 (17 Oct 2020 22:17), Max: 36.7 (17 Oct 2020 13:06)  T(F): 98.1 (17 Oct 2020 22:17), Max: 98.1 (17 Oct 2020 13:06)  HR: 82 (17 Oct 2020 22:17) (73 - 86)  BP: 163/72 (17 Oct 2020 22:17) (122/69 - 163/72)  BP(mean): --  RR: 19 (17 Oct 2020 22:17) (19 - 20)  SpO2: 99% (17 Oct 2020 22:17) (98% - 99%)  I&O's Summary    17 Oct 2020 07:01  -  18 Oct 2020 07:00  --------------------------------------------------------  IN: 0 mL / OUT: 1800 mL / NET: -1800 mL      PHYSICAL EXAM:  TELE: NSR  Constitutional: NAD, awake and alert, obese  HEENT: Moist Mucous Membranes, Anicteric  Pulmonary: Non-labored, breath sounds are clear but very diminished bilaterally, No wheezing, rales or rhonchi  Cardiovascular: Regular, S1 and S2, No murmurs, rubs, gallops or clicks  Gastrointestinal: Bowel Sounds present, soft, nontender.   Lymph: No peripheral edema. No lymphadenopathy.  Skin: No visible rashes or ulcers.  Psych:  Mood & affect: subdued  LABS: All Labs Reviewed:                        9.9    15.00 )-----------( 350      ( 17 Oct 2020 12:11 )             33.4                         8.6    9.28  )-----------( 312      ( 16 Oct 2020 06:02 )             29.9     17 Oct 2020 12:11    138    |  97     |  32     ----------------------------<  169    4.6     |  35     |  0.98   16 Oct 2020 06:02    141    |  100    |  30     ----------------------------<  179    4.9     |  38     |  1.00     Ca    9.2        17 Oct 2020 12:11  Ca    9.0        16 Oct 2020 06:02    TPro  7.0    /  Alb  2.7    /  TBili  0.3    /  DBili  x      /  AST  15     /  ALT  20     /  AlkPhos  73     17 Oct 2020 12:11  TPro  5.9    /  Alb  2.4    /  TBili  0.2    /  DBili  x      /  AST  11     /  ALT  16     /  AlkPhos  61     16 Oct 2020 06:02       EXAM:  ECHO TTE WO CON COMP W DOPP         PROCEDURE DATE:  10/06/2020        INTERPRETATION:  INDICATION: Rule out infectious endocarditis  Sonographer AS    Blood Pressure 135/74    Height 162.6 cm     Weight 76.2 kg       BSA 1.8 sq m    Dimensions:  LA 3.7       Normal Values: 2.0 - 4.0 cm  Ao 2.7        Normal Values: 2.0 - 3.8 cm  SEPTUM 1.4       Normal Values: 0.6 - 1.2 cm  PWT 1.3       Normal Values: 0.6 - 1.1 cm  LVIDd 3.9         Normal Values: 3.0 - 5.6 cm  LVIDs 2.8         Normal Values: 1.8 - 4.0 cm        OBSERVATIONS:  Technically difficult and limited study  Mitral Valve: Thickened leaflets, mild MR.  Aortic Valve/Aorta: Aortic valve is not well-visualized, grossly normal function without severe pathology  Tricuspid Valve: normal with trace TR.  Pulmonic Valve: Not well-visualized  Left Atrium: normal  Right Atrium: Not well-visualized  Left Ventricle: normal LV size and systolic function, estimated LVEF of 55%. Mild LVH. Endocardium is not well-visualized, cannot rule out segmental dysfunction  Right Ventricle: Grossly normal size and systolic function.  Pericardium/Pleura: normal, no significant pericardial effusion.  Pulmonary/RV Pressure: estimated PA systolic pressure of 15.7 mmHg assuming an RA pressure of 3 mmHg.  LV diastolic dysfunction is present    Conclusion:  Technically difficult and limited study  Mild concentric LVH with grossly normal left ventricular systolic function, estimated LVEF of 55%.  Grossly normal RV size and systolic function.  Aortic valve is not well-visualized, grossly normal function without severe pathology  Mild MR  Trace TR.  No significant pericardial effusion.      CARINA COVARRUBIAS   This document has been electronically signed. Oct  7 2020  8:35AM      EXAM:  XR CHEST PORTABLE IMMED 1V                            PROCEDURE DATE:  10/10/2020      INTERPRETATION:  INDICATION: Pneumonia; follow-up assessment.    PRIORS: 10/8/2020.    VIEWS: Portable AP radiography of the chest performed.    FINDINGS: Heart size appears within normal limits. No hilar or superior mediastinal abnormalities are identified. Infiltrates within the bilateral mid to lower lung fields are without significant change. No pleural effusion or pneumothorax is demonstrated.No mediastinal shift is noted. The visualized osseous structures appear unremarkable.    IMPRESSION: No significant interval change.    GUILLE BOBBY M.D., ATTENDING RADIOLOGIST  This document has been electronically signed. Oct 10 2020  9:57AM       Ventricular Rate 109 BPM    Atrial Rate 109 BPM    P-R Interval 150 ms    QRS Duration 136 ms    Q-T Interval 390 ms    QTC Calculation(Bazett) 525 ms    P Axis 56 degrees    R Axis -16 degrees    T Axis 57 degrees    Diagnosis Line Sinus tachycardia with premature supraventricular complexes  Right bundle branch block  Abnormal ECG  Confirmed by LUIS ENRIQUE CORMIER ([id number]) on 10/8/2020 11:41:04 AM

## 2020-10-18 NOTE — PROGRESS NOTE ADULT - SUBJECTIVE AND OBJECTIVE BOX
CHERI HARTMAN    [facility] 3WES 362 W1    Patient is a 60y old  Female who presents with a chief complaint of COPD exacerbation, PNA (18 Oct 2020 11:10)       Allergies    No Known Allergies    Intolerances    albuterol (Unknown)      HPI:  60F w/ end stage COPD on 3LNC (pending transplant list at [facility]), former smoker, CAD s/p stent (2016), afib on eliquis, uncontrolled DM2 (on insulin), raynaud phenomenon and recent hospitalization at [facility] for confusion and AURORA p/w sob. [facility]. Patient states that she has had worsening shortness of breath for past two days. Unable to obtain comprehensive history due to dyspnea. Patient denies fever, chills, sore throat, cough, chest pain, palpitations, abd pain, n/v. Currently feels short of breath even after receiving duonebs and steroids in the ED. Unable to keep mask on during interview and lay back in stretcher due to subjective sob. Patient repeatedly stating that it is too hot in her room and fanning herself with a paper. Per chart, Dr. Mathew (PCP) has chart notes regarding possible hallucinations. Would like her home medications now but otherwise has no acute complaints.    In the ED, VS T97.7F  /78 RR 21 SpO2 94% on 4LNC. Labs significant for mild leukocytosis of 11.41, H/H 9.9/30.5, thrombophilia of 435. CO2 35, albumin 2.4.   CXR done showing b/l patchy infiltrates, official read pending  CT chest done showing multifocal PNA and reactive mediastinal lymphadenopathy  EKG read limited by artifact, sinus tachycardia with RBBB and premature supraventricular complexes (06 Oct 2020 04:55)      PAST MEDICAL & SURGICAL HISTORY:  Ascorbic acid deficiency    Iron deficiency anemia    Constipation    GERD without esophagitis    Type 2 diabetes mellitus    HTN (hypertension)    Heart failure    End stage COPD  on 3LNC    Atrial fibrillation    Hyperlipemia    Chronic sinusitis    Raynaud phenomenon    Claustrophobia    COPD (chronic obstructive pulmonary disease)    S/P tonsillectomy        FAMILY HISTORY:  FH: myocardial infarction    Family history of CABG    FH: MI (myocardial infarction)    FH: CABG (coronary artery bypass surgery)          MEDICATIONS   acetaminophen   Tablet .. 650 milliGRAM(s) Oral every 6 hours PRN  ALPRAZolam 0.25 milliGRAM(s) Oral every 8 hours PRN  apixaban 5 milliGRAM(s) Oral every 12 hours  aspirin  chewable 81 milliGRAM(s) Oral daily  atorvastatin 80 milliGRAM(s) Oral at bedtime  azithromycin   Tablet 500 milliGRAM(s) Oral daily  Biotene Dry Mouth Oral Rinse 5 milliLiter(s) Swish and Spit two times a day  bisacodyl Suppository 10 milliGRAM(s) Rectal daily PRN  budesonide 160 MICROgram(s)/formoterol 4.5 MICROgram(s) Inhaler 2 Puff(s) Inhalation two times a day  buMETAnide 0.5 milliGRAM(s) Oral daily  cholecalciferol 1000 Unit(s) Oral daily  dextrose 40% Gel 15 Gram(s) Oral once PRN  dextrose 5%. 1000 milliLiter(s) IV Continuous <Continuous>  dextrose 50% Injectable 12.5 Gram(s) IV Push once  dextrose 50% Injectable 25 Gram(s) IV Push once  dextrose 50% Injectable 25 Gram(s) IV Push once  diltiazem    milliGRAM(s) Oral daily  fentaNYL   Patch  12 MICROgram(s)/Hr 1 Patch Transdermal every 72 hours  ferrous    sulfate 325 milliGRAM(s) Oral daily  glucagon  Injectable 1 milliGRAM(s) IntraMuscular once PRN  guaiFENesin  milliGRAM(s) Oral every 12 hours  insulin glargine Injectable (LANTUS) 12 Unit(s) SubCutaneous at bedtime  insulin lispro (HumaLOG) corrective regimen sliding scale   SubCutaneous three times a day before meals  insulin lispro (HumaLOG) corrective regimen sliding scale   SubCutaneous at bedtime  insulin lispro Injectable (HumaLOG) 4 Unit(s) SubCutaneous three times a day before meals  ipratropium    for Nebulization 500 MICROGram(s) Nebulizer every 6 hours  iron sucrose Injectable 100 milliGRAM(s) IV Push every 24 hours  lactulose Syrup 15 Gram(s) Oral two times a day PRN  montelukast 10 milliGRAM(s) Oral at bedtime  multivitamin 1 Tablet(s) Oral daily  pantoprazole    Tablet 40 milliGRAM(s) Oral before breakfast  polyethylene glycol 3350 17 Gram(s) Oral daily  predniSONE   Tablet 30 milliGRAM(s) Oral daily  senna 2 Tablet(s) Oral at bedtime  tiotropium 18 MICROgram(s) Capsule 1 Capsule(s) Inhalation daily      Vital Signs Last 24 Hrs  T(C): 36.7 (17 Oct 2020 22:17), Max: 36.7 (17 Oct 2020 13:06)  T(F): 98.1 (17 Oct 2020 22:17), Max: 98.1 (17 Oct 2020 13:06)  HR: 82 (17 Oct 2020 22:17) (73 - 86)  BP: 163/72 (17 Oct 2020 22:17) (122/69 - 163/72)  BP(mean): --  RR: 19 (17 Oct 2020 22:17) (19 - 20)  SpO2: 99% (17 Oct 2020 22:17) (98% - 99%)      10-17-20 @ 07:01  -  10-18-20 @ 07:00  --------------------------------------------------------  IN: 0 mL / OUT: 1800 mL / NET: -1800 mL            LABS:                        9.9    12.45 )-----------( 322      ( 18 Oct 2020 09:51 )             32.5     10-18    140  |  96  |  34<H>  ----------------------------<  167<H>  4.0   |  40<H>  |  1.00    Ca    9.3      18 Oct 2020 09:51    TPro  6.9  /  Alb  2.7<L>  /  TBili  0.4  /  DBili  x   /  AST  14<L>  /  ALT  20  /  AlkPhos  69  10-18              WBC:  WBC Count: 12.45 K/uL (10-18 @ 09:51)  WBC Count: 15.00 K/uL (10-17 @ 12:11)  WBC Count: 9.28 K/uL (10-16 @ 06:02)  WBC Count: 15.05 K/uL (10-15 @ 09:09)      MICROBIOLOGY:  RECENT CULTURES:  10-12 .Sputum Sputum XXXX   No polymorphonuclear leukocytes per low power field  Rare Squamous epithelial cells per low power field  Few Gram Negative Rods per oil power field  Rare Gram positive cocci in pairs per oil power field   Rare Mold like fungus  Normal Respiratory Samaria present                    Sodium:  Sodium, Serum: 140 mmol/L (10-18 @ 09:51)  Sodium, Serum: 138 mmol/L (10-17 @ 12:11)  Sodium, Serum: 141 mmol/L (10-16 @ 06:02)  Sodium, Serum: 140 mmol/L (10-15 @ 09:09)      1.00 mg/dL 10-18 @ 09:51  0.98 mg/dL 10-17 @ 12:11  1.00 mg/dL 10-16 @ 06:02  0.98 mg/dL 10-15 @ 09:09      Hemoglobin:  Hemoglobin: 9.9 g/dL (10-18 @ 09:51)  Hemoglobin: 9.9 g/dL (10-17 @ 12:11)  Hemoglobin: 8.6 g/dL (10-16 @ 06:02)  Hemoglobin: 9.4 g/dL (10-15 @ 09:09)      Platelets: Platelet Count - Automated: 322 K/uL (10-18 @ 09:51)  Platelet Count - Automated: 350 K/uL (10-17 @ 12:11)  Platelet Count - Automated: 312 K/uL (10-16 @ 06:02)  Platelet Count - Automated: 291 K/uL (10-15 @ 09:09)      LIVER FUNCTIONS - ( 18 Oct 2020 09:51 )  Alb: 2.7 g/dL / Pro: 6.9 g/dL / ALK PHOS: 69 U/L / ALT: 20 U/L / AST: 14 U/L / GGT: x                 RADIOLOGY & ADDITIONAL STUDIES:

## 2020-10-18 NOTE — PROGRESS NOTE ADULT - SUBJECTIVE AND OBJECTIVE BOX
Patient is a 60y old  Female who presents with a chief complaint of COPD exacerbation, PNA (18 Oct 2020 11:58)      INTERVAL HPI/OVERNIGHT EVENTS: Patient seen and examined at bedside. No events overnight. Patient notes improvement in her dyspnea on exertion, states that it was easier for her today to go to the bed to commode. Patient c/o lower extremity edema. Denies any calf pain. Denies any HA, CP, n/v, or abdominal pain.    MEDICATIONS  (STANDING):  apixaban 5 milliGRAM(s) Oral every 12 hours  aspirin  chewable 81 milliGRAM(s) Oral daily  atorvastatin 80 milliGRAM(s) Oral at bedtime  azithromycin   Tablet 500 milliGRAM(s) Oral daily  Biotene Dry Mouth Oral Rinse 5 milliLiter(s) Swish and Spit two times a day  budesonide 160 MICROgram(s)/formoterol 4.5 MICROgram(s) Inhaler 2 Puff(s) Inhalation two times a day  buMETAnide 0.5 milliGRAM(s) Oral daily  cholecalciferol 1000 Unit(s) Oral daily  dextrose 5%. 1000 milliLiter(s) (50 mL/Hr) IV Continuous <Continuous>  dextrose 50% Injectable 12.5 Gram(s) IV Push once  dextrose 50% Injectable 25 Gram(s) IV Push once  dextrose 50% Injectable 25 Gram(s) IV Push once  diltiazem    milliGRAM(s) Oral daily  ferrous    sulfate 325 milliGRAM(s) Oral daily  guaiFENesin  milliGRAM(s) Oral every 12 hours  insulin glargine Injectable (LANTUS) 12 Unit(s) SubCutaneous at bedtime  insulin lispro (HumaLOG) corrective regimen sliding scale   SubCutaneous three times a day before meals  insulin lispro (HumaLOG) corrective regimen sliding scale   SubCutaneous at bedtime  insulin lispro Injectable (HumaLOG) 4 Unit(s) SubCutaneous three times a day before meals  ipratropium    for Nebulization 500 MICROGram(s) Nebulizer every 6 hours  iron sucrose Injectable 100 milliGRAM(s) IV Push every 24 hours  montelukast 10 milliGRAM(s) Oral at bedtime  multivitamin 1 Tablet(s) Oral daily  pantoprazole    Tablet 40 milliGRAM(s) Oral before breakfast  polyethylene glycol 3350 17 Gram(s) Oral daily  predniSONE   Tablet 30 milliGRAM(s) Oral daily  senna 2 Tablet(s) Oral at bedtime  tiotropium 18 MICROgram(s) Capsule 1 Capsule(s) Inhalation daily    MEDICATIONS  (PRN):  acetaminophen   Tablet .. 650 milliGRAM(s) Oral every 6 hours PRN Mild Pain (1 - 3)  ALPRAZolam 0.25 milliGRAM(s) Oral every 8 hours PRN anxiety  bisacodyl Suppository 10 milliGRAM(s) Rectal daily PRN Constipation  dextrose 40% Gel 15 Gram(s) Oral once PRN Blood Glucose LESS THAN 70 milliGRAM(s)/deciliter  glucagon  Injectable 1 milliGRAM(s) IntraMuscular once PRN Glucose LESS THAN 70 milligrams/deciliter  lactulose Syrup 15 Gram(s) Oral two times a day PRN constipation      Allergies    No Known Allergies    Intolerances    albuterol (Unknown)      REVIEW OF SYSTEMS:  CONSTITUTIONAL: No fever or chills  HEENT:  No headache, no sore throat  RESPIRATORY: No cough, wheezing. Positive for shortness of breath  CARDIOVASCULAR: No chest pain, palpitations. Positive for lower extremity edema  GASTROINTESTINAL: No abd pain, nausea, vomiting, or diarrhea  GENITOURINARY: No dysuria, frequency, or hematuria  NEUROLOGICAL: no focal weakness or dizziness  MUSCULOSKELETAL: no myalgias     Vital Signs Last 24 Hrs  T(C): 37 (18 Oct 2020 12:43), Max: 37 (18 Oct 2020 12:43)  T(F): 98.6 (18 Oct 2020 12:43), Max: 98.6 (18 Oct 2020 12:43)  HR: 76 (18 Oct 2020 12:43) (74 - 86)  BP: 121/81 (18 Oct 2020 12:43) (121/81 - 163/72)  BP(mean): --  RR: 20 (18 Oct 2020 12:43) (19 - 20)  SpO2: 100% (18 Oct 2020 12:43) (98% - 100%)    PHYSICAL EXAM:  GENERAL: NAD, laying comfortably in bed, on NC  HEENT:  anicteric, moist mucous membranes  CHEST/LUNG:  Decreased breath sounds in all fields. No rales, wheezes, or rhonchi  HEART:  RRR, S1, S2  ABDOMEN:  BS+, soft, nontender, nondistended  EXTREMITIES: no cyanosis, or calf tenderness. 2+ pitting edema in RLE to knee, 1+ in R to ankle. (-) andres's sign.  NERVOUS SYSTEM: answers questions and follows commands appropriately    LABS:                        9.9    12.45 )-----------( 322      ( 18 Oct 2020 09:51 )             32.5     CBC Full  -  ( 18 Oct 2020 09:51 )  WBC Count : 12.45 K/uL  Hemoglobin : 9.9 g/dL  Hematocrit : 32.5 %  Platelet Count - Automated : 322 K/uL  Mean Cell Volume : 81.3 fl  Mean Cell Hemoglobin : 24.8 pg  Mean Cell Hemoglobin Concentration : 30.5 gm/dL  Auto Neutrophil # : 10.89 K/uL  Auto Lymphocyte # : 0.62 K/uL  Auto Monocyte # : 0.53 K/uL  Auto Eosinophil # : 0.23 K/uL  Auto Basophil # : 0.03 K/uL  Auto Neutrophil % : 87.5 %  Auto Lymphocyte % : 5.0 %  Auto Monocyte % : 4.3 %  Auto Eosinophil % : 1.8 %  Auto Basophil % : 0.2 %    18 Oct 2020 09:51    140    |  96     |  34     ----------------------------<  167    4.0     |  40     |  1.00     Ca    9.3        18 Oct 2020 09:51    TPro  6.9    /  Alb  2.7    /  TBili  0.4    /  DBili  x      /  AST  14     /  ALT  20     /  AlkPhos  69     18 Oct 2020 09:51        CAPILLARY BLOOD GLUCOSE      POCT Blood Glucose.: 283 mg/dL (18 Oct 2020 12:09)  POCT Blood Glucose.: 161 mg/dL (18 Oct 2020 08:12)  POCT Blood Glucose.: 113 mg/dL (17 Oct 2020 20:48)  POCT Blood Glucose.: 119 mg/dL (17 Oct 2020 17:07)        Culture - Sputum (collected 10-12-20 @ 16:44)  Source: .Sputum Sputum  Gram Stain (10-12-20 @ 19:22):    No polymorphonuclear leukocytes per low power field    Rare Squamous epithelial cells per low power field    Few Gram Negative Rods per oil power field    Rare Gram positive cocci in pairs per oil power field  Preliminary Report (10-14-20 @ 18:35):    Rare Mold like fungus    Normal Respiratory Samaria present        RADIOLOGY & ADDITIONAL TESTS:    Personally reviewed.     Consultant(s) Notes Reviewed:  [x] YES  [ ] NO

## 2020-10-19 ENCOUNTER — APPOINTMENT (OUTPATIENT)
Dept: INTERNAL MEDICINE | Facility: CLINIC | Age: 60
End: 2020-10-19
Payer: COMMERCIAL

## 2020-10-19 LAB
ALBUMIN SERPL ELPH-MCNC: 2.7 G/DL — LOW (ref 3.3–5)
ALP SERPL-CCNC: 69 U/L — SIGNIFICANT CHANGE UP (ref 40–120)
ALT FLD-CCNC: 19 U/L — SIGNIFICANT CHANGE UP (ref 12–78)
ANION GAP SERPL CALC-SCNC: 7 MMOL/L — SIGNIFICANT CHANGE UP (ref 5–17)
AST SERPL-CCNC: 13 U/L — LOW (ref 15–37)
BASOPHILS # BLD AUTO: 0.03 K/UL — SIGNIFICANT CHANGE UP (ref 0–0.2)
BASOPHILS NFR BLD AUTO: 0.2 % — SIGNIFICANT CHANGE UP (ref 0–2)
BILIRUB SERPL-MCNC: 0.4 MG/DL — SIGNIFICANT CHANGE UP (ref 0.2–1.2)
BUN SERPL-MCNC: 35 MG/DL — HIGH (ref 7–23)
CALCIUM SERPL-MCNC: 9.4 MG/DL — SIGNIFICANT CHANGE UP (ref 8.5–10.1)
CHLORIDE SERPL-SCNC: 96 MMOL/L — SIGNIFICANT CHANGE UP (ref 96–108)
CO2 SERPL-SCNC: 37 MMOL/L — HIGH (ref 22–31)
CREAT SERPL-MCNC: 1.1 MG/DL — SIGNIFICANT CHANGE UP (ref 0.5–1.3)
CULTURE RESULTS: SIGNIFICANT CHANGE UP
EOSINOPHIL # BLD AUTO: 0.31 K/UL — SIGNIFICANT CHANGE UP (ref 0–0.5)
EOSINOPHIL NFR BLD AUTO: 2.3 % — SIGNIFICANT CHANGE UP (ref 0–6)
GALACTOMANNAN AG SERPL-ACNC: <0.5 INDEX — SIGNIFICANT CHANGE UP
GLUCOSE SERPL-MCNC: 141 MG/DL — HIGH (ref 70–99)
HCT VFR BLD CALC: 32.6 % — LOW (ref 34.5–45)
HGB BLD-MCNC: 10 G/DL — LOW (ref 11.5–15.5)
IMM GRANULOCYTES NFR BLD AUTO: 1.6 % — HIGH (ref 0–1.5)
LYMPHOCYTES # BLD AUTO: 0.97 K/UL — LOW (ref 1–3.3)
LYMPHOCYTES # BLD AUTO: 7 % — LOW (ref 13–44)
MCHC RBC-ENTMCNC: 25.2 PG — LOW (ref 27–34)
MCHC RBC-ENTMCNC: 30.7 GM/DL — LOW (ref 32–36)
MCV RBC AUTO: 82.1 FL — SIGNIFICANT CHANGE UP (ref 80–100)
MONOCYTES # BLD AUTO: 0.86 K/UL — SIGNIFICANT CHANGE UP (ref 0–0.9)
MONOCYTES NFR BLD AUTO: 6.2 % — SIGNIFICANT CHANGE UP (ref 2–14)
NEUTROPHILS # BLD AUTO: 11.38 K/UL — HIGH (ref 1.8–7.4)
NEUTROPHILS NFR BLD AUTO: 82.7 % — HIGH (ref 43–77)
NRBC # BLD: 0 /100 WBCS — SIGNIFICANT CHANGE UP (ref 0–0)
PLATELET # BLD AUTO: 308 K/UL — SIGNIFICANT CHANGE UP (ref 150–400)
POTASSIUM SERPL-MCNC: 4 MMOL/L — SIGNIFICANT CHANGE UP (ref 3.5–5.3)
POTASSIUM SERPL-SCNC: 4 MMOL/L — SIGNIFICANT CHANGE UP (ref 3.5–5.3)
PROT SERPL-MCNC: 7 G/DL — SIGNIFICANT CHANGE UP (ref 6–8.3)
RBC # BLD: 3.97 M/UL — SIGNIFICANT CHANGE UP (ref 3.8–5.2)
RBC # FLD: 17.6 % — HIGH (ref 10.3–14.5)
SODIUM SERPL-SCNC: 140 MMOL/L — SIGNIFICANT CHANGE UP (ref 135–145)
SPECIMEN SOURCE: SIGNIFICANT CHANGE UP
WBC # BLD: 13.77 K/UL — HIGH (ref 3.8–10.5)
WBC # FLD AUTO: 13.77 K/UL — HIGH (ref 3.8–10.5)

## 2020-10-19 PROCEDURE — 99232 SBSQ HOSP IP/OBS MODERATE 35: CPT

## 2020-10-19 PROCEDURE — 99442: CPT

## 2020-10-19 PROCEDURE — 99232 SBSQ HOSP IP/OBS MODERATE 35: CPT | Mod: GC

## 2020-10-19 RX ORDER — BUMETANIDE 0.25 MG/ML
1 INJECTION INTRAMUSCULAR; INTRAVENOUS
Refills: 0 | Status: DISCONTINUED | OUTPATIENT
Start: 2020-10-19 | End: 2020-10-23

## 2020-10-19 RX ADMIN — Medication 2: at 12:25

## 2020-10-19 RX ADMIN — AZITHROMYCIN 500 MILLIGRAM(S): 500 TABLET, FILM COATED ORAL at 10:55

## 2020-10-19 RX ADMIN — Medication 20 MILLIGRAM(S): at 10:55

## 2020-10-19 RX ADMIN — Medication 81 MILLIGRAM(S): at 10:55

## 2020-10-19 RX ADMIN — ATORVASTATIN CALCIUM 80 MILLIGRAM(S): 80 TABLET, FILM COATED ORAL at 21:55

## 2020-10-19 RX ADMIN — Medication 30 MILLIGRAM(S): at 06:26

## 2020-10-19 RX ADMIN — Medication 325 MILLIGRAM(S): at 10:55

## 2020-10-19 RX ADMIN — Medication 600 MILLIGRAM(S): at 06:26

## 2020-10-19 RX ADMIN — Medication 500 MICROGRAM(S): at 07:24

## 2020-10-19 RX ADMIN — Medication 4 UNIT(S): at 12:25

## 2020-10-19 RX ADMIN — Medication 10: at 17:17

## 2020-10-19 RX ADMIN — BUMETANIDE 0.5 MILLIGRAM(S): 0.25 INJECTION INTRAMUSCULAR; INTRAVENOUS at 06:27

## 2020-10-19 RX ADMIN — PANTOPRAZOLE SODIUM 40 MILLIGRAM(S): 20 TABLET, DELAYED RELEASE ORAL at 06:26

## 2020-10-19 RX ADMIN — Medication 1000 UNIT(S): at 10:55

## 2020-10-19 RX ADMIN — BUMETANIDE 1 MILLIGRAM(S): 0.25 INJECTION INTRAMUSCULAR; INTRAVENOUS at 17:17

## 2020-10-19 RX ADMIN — MONTELUKAST 10 MILLIGRAM(S): 4 TABLET, CHEWABLE ORAL at 21:55

## 2020-10-19 RX ADMIN — INSULIN GLARGINE 12 UNIT(S): 100 INJECTION, SOLUTION SUBCUTANEOUS at 21:58

## 2020-10-19 RX ADMIN — BUDESONIDE AND FORMOTEROL FUMARATE DIHYDRATE 2 PUFF(S): 160; 4.5 AEROSOL RESPIRATORY (INHALATION) at 18:56

## 2020-10-19 RX ADMIN — Medication 1 TABLET(S): at 10:56

## 2020-10-19 RX ADMIN — Medication 4 UNIT(S): at 17:18

## 2020-10-19 RX ADMIN — APIXABAN 5 MILLIGRAM(S): 2.5 TABLET, FILM COATED ORAL at 06:28

## 2020-10-19 RX ADMIN — Medication 600 MILLIGRAM(S): at 17:17

## 2020-10-19 RX ADMIN — SENNA PLUS 2 TABLET(S): 8.6 TABLET ORAL at 21:55

## 2020-10-19 RX ADMIN — APIXABAN 5 MILLIGRAM(S): 2.5 TABLET, FILM COATED ORAL at 17:17

## 2020-10-19 RX ADMIN — Medication 4 UNIT(S): at 08:24

## 2020-10-19 RX ADMIN — Medication 500 MICROGRAM(S): at 19:17

## 2020-10-19 RX ADMIN — IRON SUCROSE 100 MILLIGRAM(S): 20 INJECTION, SOLUTION INTRAVENOUS at 10:55

## 2020-10-19 RX ADMIN — BUDESONIDE AND FORMOTEROL FUMARATE DIHYDRATE 2 PUFF(S): 160; 4.5 AEROSOL RESPIRATORY (INHALATION) at 06:25

## 2020-10-19 RX ADMIN — Medication 240 MILLIGRAM(S): at 06:26

## 2020-10-19 RX ADMIN — Medication 500 MICROGRAM(S): at 14:10

## 2020-10-19 NOTE — PROGRESS NOTE ADULT - SUBJECTIVE AND OBJECTIVE BOX
Patient is a 60y old  Female who presents with a chief complaint of COPD exacerbation, PNA (08 Oct 2020 11:37)       HPI:  60F w/ end stage COPD on 3LNC (pending transplant list at [facility]), former smoker, CAD s/p stent (2016), afib on eliquis, uncontrolled DM2 (on insulin), raynaud phenomenon and recent hospitalization at [facility] for confusion and AURORA p/w sob. Sent [facility]. Patient states that she has had worsening shortness of breath for past two days. Unable to obtain comprehensive history due to dyspnea. Patient denies fever, chills, sore throat, cough, chest pain, palpitations, abd pain, n/v. Currently feels short of breath even after receiving duonebs and steroids in the ED. Unable to keep mask on during interview and lay back in stretcher due to subjective sob. Patient repeatedly stating that it is too hot in her room and fanning herself with a paper. Per chart, Dr. Mathew (PCP) has chart notes regarding possible hallucinations. Would like her home medications now but otherwise has no acute complaints.  Has not seen nephrologist.  Denies any N/V.  SOB improving.  States she is urinating well.  Denies any urinary retention.  Denies any supplement or NSAID usage.         Still c/o edema, but improved    PAST MEDICAL & SURGICAL HISTORY:  H/O Raynaud&#x27;s syndrome    Ascorbic acid deficiency    Iron deficiency anemia    Constipation    GERD without esophagitis    Type 2 diabetes mellitus    HTN (hypertension)    Heart failure    HLD (hyperlipidemia)    History of chronic atrial fibrillation  on Eliquis    End stage COPD  on 3LNC    Atrial fibrillation    Hyperlipemia    Chronic sinusitis    Raynaud phenomenon    HTN (hypertension)    DM (diabetes mellitus)    Claustrophobia    COPD (chronic obstructive pulmonary disease)    History of tonsillectomy    S/P tonsillectomy         FAMILY HISTORY:  FH: myocardial infarction    Family history of CABG    FH: MI (myocardial infarction)    FH: CABG (coronary artery bypass surgery)    NC    Social History:Non smoker    MEDICATIONS  (STANDING):  apixaban 5 milliGRAM(s) Oral every 12 hours  ascorbic acid 500 milliGRAM(s) Oral daily  aspirin  chewable 81 milliGRAM(s) Oral daily  atorvastatin 80 milliGRAM(s) Oral at bedtime  azithromycin  IVPB 500 milliGRAM(s) IV Intermittent every 24 hours  budesonide 160 MICROgram(s)/formoterol 4.5 MICROgram(s) Inhaler 2 Puff(s) Inhalation two times a day  cefepime   IVPB 2000 milliGRAM(s) IV Intermittent every 12 hours  cholecalciferol 1000 Unit(s) Oral daily  dextrose 5%. 1000 milliLiter(s) (50 mL/Hr) IV Continuous <Continuous>  dextrose 50% Injectable 12.5 Gram(s) IV Push once  dextrose 50% Injectable 25 Gram(s) IV Push once  dextrose 50% Injectable 25 Gram(s) IV Push once  ferrous    sulfate 325 milliGRAM(s) Oral daily  insulin glargine Injectable (LANTUS) 10 Unit(s) SubCutaneous at bedtime  insulin lispro (HumaLOG) corrective regimen sliding scale   SubCutaneous three times a day before meals  insulin lispro Injectable (HumaLOG) 3 Unit(s) SubCutaneous three times a day before meals  montelukast 10 milliGRAM(s) Oral at bedtime  multivitamin 1 Tablet(s) Oral daily  pantoprazole    Tablet 40 milliGRAM(s) Oral before breakfast  polyethylene glycol 3350 17 Gram(s) Oral daily  potassium chloride    Tablet ER 40 milliEquivalent(s) Oral every 4 hours  senna 2 Tablet(s) Oral at bedtime  tiotropium 18 MICROgram(s) Capsule 1 Capsule(s) Inhalation daily    MEDICATIONS  (PRN):  acetaminophen   Tablet .. 650 milliGRAM(s) Oral every 6 hours PRN Mild Pain (1 - 3)  bisacodyl Suppository 10 milliGRAM(s) Rectal daily PRN Constipation  dextrose 40% Gel 15 Gram(s) Oral once PRN Blood Glucose LESS THAN 70 milliGRAM(s)/deciliter  glucagon  Injectable 1 milliGRAM(s) IntraMuscular once PRN Glucose LESS THAN 70 milligrams/deciliter  guaiFENesin   Syrup  (Sugar-Free) 100 milliGRAM(s) Oral every 6 hours PRN Cough  lactulose Syrup 15 Gram(s) Oral two times a day PRN constipation  levalbuterol Inhalation 0.63 milliGRAM(s) Inhalation every 4 hours PRN shortness of breath and/or wheezing  oxyCODONE    IR 5 milliGRAM(s) Oral every 6 hours PRN Moderate Pain (4 - 6)   Meds reviewed    Allergies    No Known Allergies    Intolerances    albuterol (Unknown)       REVIEW OF SYSTEMS:    CONSTITUTIONAL:  No weight loss   EYES: No eye pain, visual disturbances, or discharge  ENMT:  No difficulty hearing, tinnitus, vertigo; No sinus or throat pain  NECK: No pain or stiffness  BREASTS: No pain, masses, or nipple discharge  RESPIRATORY: +SOB. +NIELSON  CARDIOVASCULAR: No chest pain, palpitations, dizziness,   GASTROINTESTINAL: No abdominal or epigastric pain. No nausea, vomiting, or hematemesis;  GENITOURINARY: No dysuria, frequency, hematuria, or incontinence  NEUROLOGICAL: No headaches, memory loss, loss of strength, numbness, or tremors  SKIN: no Diffuse erythema, no blisters  LYMPH NODES: No enlarged glands  ENDOCRINE: No heat or cold intolerance; No hair loss  MUSCULOSKELETAL: No joint pain or swelling   PSYCHIATRIC: No depression, anxiety, mood swings, or difficulty sleeping  ALLERGY AND IMMUNOLOGIC: No hives or eczema    ICU Vital Signs Last 24 Hrs  T(C): 36.6 (19 Oct 2020 12:57), Max: 36.8 (18 Oct 2020 21:24)  T(F): 97.9 (19 Oct 2020 12:57), Max: 98.3 (18 Oct 2020 21:24)  HR: 70 (19 Oct 2020 12:57) (70 - 101)  BP: 128/68 (19 Oct 2020 12:57) (126/68 - 135/82)  BP(mean): --  ABP: --  ABP(mean): --  RR: 17 (19 Oct 2020 12:57) (17 - 20)  SpO2: 95% (19 Oct 2020 12:57) (93% - 98%)                PHYSICAL EXAM:    GENERAL: No distress  HEAD:  Atraumatic, Normocephalic  EYES: EOMI, conjunctiva and sclera clear  ENMT: No drainage from nares, no drainage from pinna  NECK: Supple, neck  veins full  NERVOUS SYSTEM:  Alert & Oriented X3, Good concentration; Motor Strength wnl upper and lower extremities  CHEST/LUNG: Decreased BS,no rales, no rhonchi, Mild wheezing  HEART: Regular rate and rhythm; No murmurs, rubs, or gallops  ABDOMEN: Soft, Nontender, Nondistended; Bowel sounds present,  EXTREMITIES: trace Edema  SKIN: No rashes or lesions, pale      LABS:                                   ICU Vital Signs Last 24 Hrs  T(C): 36.6 (19 Oct 2020 12:57), Max: 36.8 (18 Oct 2020 21:24)  T(F): 97.9 (19 Oct 2020 12:57), Max: 98.3 (18 Oct 2020 21:24)  HR: 70 (19 Oct 2020 12:57) (70 - 101)  BP: 128/68 (19 Oct 2020 12:57) (126/68 - 135/82)  BP(mean): --  ABP: --  ABP(mean): --  RR: 17 (19 Oct 2020 12:57) (17 - 20)  SpO2: 95% (19 Oct 2020 12:57) (93% - 98%)        ABG - ( 16 Oct 2020 11:26 )  pH, Arterial: 7.38  pH, Blood: x     /  pCO2: 57    /  pO2: 70    / HCO3: 30    / Base Excess: 7.3   /  SaO2: 93

## 2020-10-19 NOTE — PROGRESS NOTE ADULT - ASSESSMENT
Pt. with endstage COPD on transplant list, with acute pneumonia.  Improved clincally.  Fungus in sputum colonization.  Will need extensive PT.     FU Dr Mathew as outpt.   Can taper steroids.     Trial atrovent hhn which helped.   Inhalers.  PT.

## 2020-10-19 NOTE — PROGRESS NOTE ADULT - ASSESSMENT
60F w/ end stage COPD on 3LNC (pending transplant list at [facility]), former smoker, CAD s/p stent (2016), Afib on Eliquis,  uncontrolled DM2 (on insulin), raynaud phenomenon and recent hospitalization at [facility] for confusion and AURORA p/w sob, admitted for COPD exacerbation and multifocal PNA    CAD s/p stent   - No clinical evidnce of ACS  - Continue asa and statin  - EKG showed SR @ 109, RBBB that is chronic  - increased bumex to 1mg PO- 12  (home dose)    Paroxysmal Afib  -  Remains NSR on tele with no events   - Continue Eliquis 5mg  - Continue Cardizem CD at 240mg   - Monitor electrolytes, replete to keep K>4 and Mag>2  - TTE w/ mild concentric LVH grossly nL LVSF ef 55%, mild MR    End stage COPD/Pneumonia  - Pulm following.  Per note, patient is on waiting list for transplant  - CT chest noted  - Continue steroids and antibiotics per Pulm  - Continue NC and BIPAP/CPAP prn/nocturnally  - Continue to encourage incentive spirometer    VTE ppx  - On Eliquis    - All other medical needs as per primary team.  - Other cardiovascular workup will depend on clinical course.  - Will continue to follow.  Flori Wise, [facility]  Cardiology  [phone]

## 2020-10-19 NOTE — PROGRESS NOTE ADULT - ASSESSMENT
AURORA on CKD 2  Hyponatremia  Hypokalemia  COPD  Hypercalcemia  Metabolic Alkalosis     -BLCr 1  -AURORA unclear etiology, clinically ATN  -UA - 3-5 WBC, trace ketones, 30 proteins  -FeUrea 34.6%  -UPC 0.44  -Ur osm 454  -Corrected calcium 10.2, with paraprotein gap and anemia; FLC ratio is normal; SPEP with gamma migrating protein seen  -Iron repletion   -Metformin on hold  -Renal indices are stable at baseline; Bumex increased and changed to BID by cardio, creatinine increasing-if continues to worsen decrease to daily  -Less likely AIN given paucity of WBC on UA and no peripheral eosinophils  -Strict I&Os  -Pulm follow up noted  -Heme/onc note reviewed.  Possible MGUS, repeat studies as outpatient  -Alkalosis likely 2/2 compensation for respiratory acidosis    full range of motion in all extremities

## 2020-10-19 NOTE — PROGRESS NOTE ADULT - SUBJECTIVE AND OBJECTIVE BOX
Patient seen and examined;  Chart reviewed and events noted;   Remains on CPAP    MEDICATIONS  (STANDING):  apixaban 5 milliGRAM(s) Oral every 12 hours  aspirin  chewable 81 milliGRAM(s) Oral daily  atorvastatin 80 milliGRAM(s) Oral at bedtime  azithromycin   Tablet 500 milliGRAM(s) Oral daily  Biotene Dry Mouth Oral Rinse 5 milliLiter(s) Swish and Spit two times a day  budesonide 160 MICROgram(s)/formoterol 4.5 MICROgram(s) Inhaler 2 Puff(s) Inhalation two times a day  buMETAnide 0.5 milliGRAM(s) Oral daily  cholecalciferol 1000 Unit(s) Oral daily  dextrose 5%. 1000 milliLiter(s) (50 mL/Hr) IV Continuous <Continuous>  dextrose 50% Injectable 12.5 Gram(s) IV Push once  dextrose 50% Injectable 25 Gram(s) IV Push once  dextrose 50% Injectable 25 Gram(s) IV Push once  diltiazem    milliGRAM(s) Oral daily  ferrous    sulfate 325 milliGRAM(s) Oral daily  guaiFENesin  milliGRAM(s) Oral every 12 hours  insulin glargine Injectable (LANTUS) 12 Unit(s) SubCutaneous at bedtime  insulin lispro (HumaLOG) corrective regimen sliding scale   SubCutaneous three times a day before meals  insulin lispro (HumaLOG) corrective regimen sliding scale   SubCutaneous at bedtime  insulin lispro Injectable (HumaLOG) 4 Unit(s) SubCutaneous three times a day before meals  ipratropium    for Nebulization 500 MICROGram(s) Nebulizer every 6 hours  iron sucrose Injectable 100 milliGRAM(s) IV Push every 24 hours  montelukast 10 milliGRAM(s) Oral at bedtime  multivitamin 1 Tablet(s) Oral daily  pantoprazole    Tablet 40 milliGRAM(s) Oral before breakfast  polyethylene glycol 3350 17 Gram(s) Oral daily  predniSONE   Tablet 20 milliGRAM(s) Oral daily  senna 2 Tablet(s) Oral at bedtime  tiotropium 18 MICROgram(s) Capsule 1 Capsule(s) Inhalation daily    MEDICATIONS  (PRN):  acetaminophen   Tablet .. 650 milliGRAM(s) Oral every 6 hours PRN Mild Pain (1 - 3)  ALPRAZolam 0.25 milliGRAM(s) Oral every 8 hours PRN anxiety  bisacodyl Suppository 10 milliGRAM(s) Rectal daily PRN Constipation  dextrose 40% Gel 15 Gram(s) Oral once PRN Blood Glucose LESS THAN 70 milliGRAM(s)/deciliter  glucagon  Injectable 1 milliGRAM(s) IntraMuscular once PRN Glucose LESS THAN 70 milligrams/deciliter  lactulose Syrup 15 Gram(s) Oral two times a day PRN constipation      Vital Signs Last 24 Hrs  T(C): 36.4 (19 Oct 2020 06:02), Max: 37 (18 Oct 2020 12:43)  T(F): 97.6 (19 Oct 2020 06:02), Max: 98.6 (18 Oct 2020 12:43)  HR: 98 (19 Oct 2020 07:35) (70 - 101)  BP: 135/82 (19 Oct 2020 06:02) (121/81 - 135/82)  BP(mean): --  RR: 20 (19 Oct 2020 06:02) (19 - 20)  SpO2: 93% (19 Oct 2020 07:35) (93% - 100%)    PHYSICAL EXAM  General: adult in NAD  HEENT: clear oropharynx, anicteric sclera, pink conjunctivae  Neck: supple  CV: normal S1S2 with no murmur rubs or gallops  Lungs: clear to auscultation, no wheezes, no rhales  Abdomen: soft non-tender non-distended, no hepato/splenomegaly  Ext: no clubbing cyanosis or edema  Skin: no rashes and no petichiae  Neuro: alert and oriented X3 no focal deficits      LABS:                        10.0   13.77 )-----------( 308      ( 19 Oct 2020 09:26 )             32.6     Hemoglobin: 10.0 g/dL (10-19 @ 09:26)  Hemoglobin: 9.9 g/dL (10-18 @ 09:51)  Hemoglobin: 9.9 g/dL (10-17 @ 12:11)  Hemoglobin: 8.6 g/dL (10-16 @ 06:02)  Hemoglobin: 9.4 g/dL (10-15 @ 09:09)    10-19    140  |  96  |  35<H>  ----------------------------<  141<H>  4.0   |  37<H>  |  1.10    Ca    9.4      19 Oct 2020 09:26    TPro  7.0  /  Alb  2.7<L>  /  TBili  0.4  /  DBili  x   /  AST  13<L>  /  ALT  19  /  AlkPhos  69  10-19

## 2020-10-19 NOTE — PROGRESS NOTE ADULT - PROBLEM SELECTOR PLAN 6
recent TTE in 08/2020 showing normal LVEF, stage 1 diastolic dysfunction  - TTE (10/6/20) LVEF of 55%   - home bumex initially held 2/2 to AURORA  - Patient required bumex PO last night due to signs of overload  - will add bumex 0.5mg qd as per nephro recs  - caution with excessive IVF recent TTE in 08/2020 showing normal LVEF, stage 1 diastolic dysfunction  - TTE (10/6/20) LVEF of 55%   - home bumex initially held 2/2 to AURORA  - Home dose Bumex resumed (1mg PO BID)  - caution with excessive IVF

## 2020-10-19 NOTE — PROGRESS NOTE ADULT - SUBJECTIVE AND OBJECTIVE BOX
[facility]  INFECTIOUS DISEASES   =======================================================    [facility]-[id number]  CHERI HARTMAN     Follow up: COPD exacerbation, Pneumonia    No new changes or overnight event.   No fever. Feels slightly better, using commode without any NIELSON.     PAST MEDICAL & SURGICAL HISTORY:  H/O Raynaud&#x27;s syndrome  Ascorbic acid deficiency  Iron deficiency anemia  Constipation  GERD without esophagitis  Type 2 diabetes mellitus  HTN (hypertension)  Heart failure  HLD (hyperlipidemia)  History of chronic atrial fibrillation  on Eliquis  End stage COPD  on 3LNC  History of tonsillectomy    Social Hx: former heavy smoker, no ETOH or drugs     FAMILY HISTORY:  FH: myocardial infarction    Family history of CABG    Allergies  No Known Allergies    Antibiotics:  Azithromycin      REVIEW OF SYSTEMS:  CONSTITUTIONAL:  No Fever or chills  HEENT:  No diplopia or blurred vision.  No sore throat or runny nose.  CARDIOVASCULAR:  No chest pain or SOB.  RESPIRATORY:  +cough, severe SOB  GASTROINTESTINAL:  No nausea, vomiting or diarrhea.  GENITOURINARY:  No dysuria, frequency or urgency. No Blood in urine  MUSCULOSKELETAL:  no joint aches, no muscle pain  SKIN:  No change in skin, hair or nails.  NEUROLOGIC:  No paresthesias, fasciculations, seizures or weakness.  PSYCHIATRIC:  No disorder of thought or mood.  ENDOCRINE:  No heat or cold intolerance, polyuria or polydipsia.  HEMATOLOGICAL:  No easy bruising or bleeding.     Physical Exam:  Vital Signs Last 24 Hrs  T(C): 36.6 (19 Oct 2020 12:57), Max: 36.8 (18 Oct 2020 21:24)  T(F): 97.9 (19 Oct 2020 12:57), Max: 98.3 (18 Oct 2020 21:24)  HR: 70 (19 Oct 2020 12:57) (70 - 101)  BP: 128/68 (19 Oct 2020 12:57) (126/68 - 135/82)  BP(mean): --  RR: 17 (19 Oct 2020 12:57) (17 - 20)  SpO2: 95% (19 Oct 2020 12:57) (93% - 98%)  GEN: NAD  HEENT: normocephalic and atraumatic. EOMI. PERRL.    NECK: Supple.  No lymphadenopathy   LUNGS: very poor air movement but Clear to auscultation with no wheezing or rales .  HEART: Regular rate and rhythm   ABDOMEN: Soft, nontender, and nondistended.  Positive bowel sounds.    : No CVA tenderness  EXTREMITIES: Without any cyanosis, clubbing, rash, lesions or edema.  NEUROLOGIC: grossly intact.  PSYCHIATRIC: Appropriate affect .  SKIN: No ulceration or induration present.    Labs:                        10.0   13.77 )-----------( 308      ( 19 Oct 2020 09:26 )             32.6      10-19    140  |  96  |  35<H>  ----------------------------<  141<H>  4.0   |  37<H>  |  1.10    Ca    9.4      19 Oct 2020 09:26    TPro  7.0  /  Alb  2.7<L>  /  TBili  0.4  /  DBili  x   /  AST  13<L>  /  ALT  19  /  AlkPhos  69  10-19    Culture - Sputum (collected 10-12-20 @ 16:44)  Source: .Sputum Sputum  Gram Stain (10-12-20 @ 19:22):    No polymorphonuclear leukocytes per low power field    Rare Squamous epithelial cells per low power field    Few Gram Negative Rods per oil power field    Rare Gram positive cocci in pairs per oil power field  Final Report (10-19-20 @ 12:09):    Rare Aspergillus fumigatus    Normal Respiratory Richie present    Culture - Sputum (collected 10-09-20 @ 06:28)  Source: .Sputum Sputum  Gram Stain (10-09-20 @ 13:47):    No polymorphonuclear leukocytes per low power field    Few Squamous epithelial cells per low power field    Few Gram Positive Cocci in Clusters per oil power field  Final Report (10-11-20 @ 08:47):    Normal Respiratory Richie present    Culture - Blood (collected 10-06-20 @ 09:23)  Source: .Blood Blood  Final Report (10-11-20 @ 10:00):    No Growth Final    Culture - Blood (collected 10-06-20 @ 09:23)  Source: .Blood Blood  Final Report (10-11-20 @ 10:00):    No Growth Final    WBC Count: 13.77 K/uL (10-19-20 @ 09:26)  WBC Count: 12.45 K/uL (10-18-20 @ 09:51)  WBC Count: 15.00 K/uL (10-17-20 @ 12:11)  WBC Count: 9.28 K/uL (10-16-20 @ 06:02)  WBC Count: 15.05 K/uL (10-15-20 @ 09:09)    Creatinine, Serum: 1.10 mg/dL (10-19-20 @ 09:26)  Creatinine, Serum: 1.00 mg/dL (10-18-20 @ 09:51)  Creatinine, Serum: 0.98 mg/dL (10-17-20 @ 12:11)  Creatinine, Serum: 1.00 mg/dL (10-16-20 @ 06:02)  Creatinine, Serum: 0.98 mg/dL (10-15-20 @ 09:09)    C-Reactive Protein, Serum: 26.32 mg/dL (10-06-20 @ 09:01)    Ferritin, Serum: 96 ng/mL (10-09-20 @ 12:19)    Procalcitonin, Serum: <0.05 ng/mL (10-16-20 @ 06:02)  Procalcitonin, Serum: 0.05 ng/mL (10-10-20 @ 06:48)     COVID-19 PCR: NotDetec (10-06-20 @ 03:23)    Imaging:  < from: Xray Chest 1 View-PORTABLE IMMEDIATE (Xray Chest 1 View-PORTABLE IMMEDIATE .) (10.10.20 @ 09:54) >  EXAM:  XR CHEST PORTABLE IMMED 1V                        PROCEDURE DATE:  10/10/2020    INTERPRETATION:  INDICATION: Pneumonia; follow-up assessment.  PRIORS: 10/8/2020.  VIEWS: Portable AP radiography of the chest performed.  FINDINGS: Heart size appears within normal limits. No hilar or superior mediastinal abnormalities are identified. Infiltrates within the bilateral mid to lower lung fields are without significant change. No pleural effusion or pneumothorax is demonstrated. No mediastinal shift is noted. The visualized osseous structures appear unremarkable.  IMPRESSION: No significant interval change.    Assessment and Plan:   61y/o woman with end stage COPD on 3L O2 at home awaiting transplant at [facility], former smoker, CAD s/p stent, afib, DM2), raynaud phenomenon and recent hospitalization at [facility] for confusion and AURORA has been sent from [facility] with worsening of SOB. CT with multilobar opacities. Due to recent hospitalization and rehab stay, will cover for possible HCAP. Very high risk with a poor lung capacities.   10/16 s/p 10days of ceftriaxone and now on azithromycin daily, sputum showed rate mold, most likely not an invasive fungal infection, ABPA is a possibility, Eos% not high.    Legionella U ag negative   TTE, result noted, EF=55%,  CXR and CT with multilobar opacities  Rare aspergillus in sputum   Normal PCT x 2     COPD, Pneumonia  - Blood culture NGTD  - First Sputum culture with normal respiratory richie and 2nd one with rare aspergillus   - Galactomannan testing, will follow results   - Pulmonary consult noted, possibly mold in sputum is colonization   - Azithromycin given by pulmonary for antiinflammatory effect and prophylaxis.   - Will hold on antifungal treatment for now     Will follow.     Wagner Juarez MD  Division of Infectious Diseases   Cell [phone] between 7am and 5pm   After 5pm and weekends please call ID service at [phone].

## 2020-10-19 NOTE — PROGRESS NOTE ADULT - SUBJECTIVE AND OBJECTIVE BOX
[facility] -- Adriana Gonzales, Gina, Funmilayo, Dylan Cormier Savella, Goodger  Office # [phone]    Follow Up:    short of breath PNA, awaiting a lung transplant  Subjective/Observations:   Patient is in a pleasant mood, she has complaints of not being able to breathe, the room is too warm. She endorses she is short of breath, she cannot do       REVIEW OF SYSTEMS: All other review of systems is negative unless indicated above  PAST MEDICAL & SURGICAL HISTORY:  Ascorbic acid deficiency    Iron deficiency anemia    Constipation    GERD without esophagitis    Type 2 diabetes mellitus    HTN (hypertension)    Heart failure    End stage COPD  on 3LNC    Atrial fibrillation    Hyperlipemia    Chronic sinusitis    Raynaud phenomenon    Claustrophobia    COPD (chronic obstructive pulmonary disease)    S/P tonsillectomy      MEDICATIONS  (STANDING):  apixaban 5 milliGRAM(s) Oral every 12 hours  aspirin  chewable 81 milliGRAM(s) Oral daily  atorvastatin 80 milliGRAM(s) Oral at bedtime  azithromycin   Tablet 500 milliGRAM(s) Oral daily  Biotene Dry Mouth Oral Rinse 5 milliLiter(s) Swish and Spit two times a day  budesonide 160 MICROgram(s)/formoterol 4.5 MICROgram(s) Inhaler 2 Puff(s) Inhalation two times a day  buMETAnide 0.5 milliGRAM(s) Oral daily  cholecalciferol 1000 Unit(s) Oral daily  dextrose 5%. 1000 milliLiter(s) (50 mL/Hr) IV Continuous <Continuous>  dextrose 50% Injectable 12.5 Gram(s) IV Push once  dextrose 50% Injectable 25 Gram(s) IV Push once  dextrose 50% Injectable 25 Gram(s) IV Push once  diltiazem    milliGRAM(s) Oral daily  ferrous    sulfate 325 milliGRAM(s) Oral daily  guaiFENesin  milliGRAM(s) Oral every 12 hours  insulin glargine Injectable (LANTUS) 12 Unit(s) SubCutaneous at bedtime  insulin lispro (HumaLOG) corrective regimen sliding scale   SubCutaneous three times a day before meals  insulin lispro (HumaLOG) corrective regimen sliding scale   SubCutaneous at bedtime  insulin lispro Injectable (HumaLOG) 4 Unit(s) SubCutaneous three times a day before meals  ipratropium    for Nebulization 500 MICROGram(s) Nebulizer every 6 hours  montelukast 10 milliGRAM(s) Oral at bedtime  multivitamin 1 Tablet(s) Oral daily  pantoprazole    Tablet 40 milliGRAM(s) Oral before breakfast  polyethylene glycol 3350 17 Gram(s) Oral daily  predniSONE   Tablet 20 milliGRAM(s) Oral daily  senna 2 Tablet(s) Oral at bedtime  tiotropium 18 MICROgram(s) Capsule 1 Capsule(s) Inhalation daily    MEDICATIONS  (PRN):  acetaminophen   Tablet .. 650 milliGRAM(s) Oral every 6 hours PRN Mild Pain (1 - 3)  ALPRAZolam 0.25 milliGRAM(s) Oral every 8 hours PRN anxiety  bisacodyl Suppository 10 milliGRAM(s) Rectal daily PRN Constipation  dextrose 40% Gel 15 Gram(s) Oral once PRN Blood Glucose LESS THAN 70 milliGRAM(s)/deciliter  glucagon  Injectable 1 milliGRAM(s) IntraMuscular once PRN Glucose LESS THAN 70 milligrams/deciliter  lactulose Syrup 15 Gram(s) Oral two times a day PRN constipation    Allergies    No Known Allergies    Intolerances    albuterol (Unknown)    Vital Signs Last 24 Hrs  T(C): 36.4 (19 Oct 2020 06:02), Max: 37 (18 Oct 2020 12:43)  T(F): 97.6 (19 Oct 2020 06:02), Max: 98.6 (18 Oct 2020 12:43)  HR: 98 (19 Oct 2020 07:35) (70 - 101)  BP: 135/82 (19 Oct 2020 06:02) (121/81 - 135/82)  BP(mean): --  RR: 20 (19 Oct 2020 06:02) (19 - 20)  SpO2: 93% (19 Oct 2020 07:35) (93% - 100%)  I&O's Summary    18 Oct 2020 07:01  -  19 Oct 2020 07:00  --------------------------------------------------------  IN: 0 mL / OUT: 1500 mL / NET: -1500 mL    19 Oct 2020 07:01  -  19 Oct 2020 11:11  --------------------------------------------------------  IN: 0 mL / OUT: 800 mL / NET: -800 mL        PHYSICAL EXAM:  TELE: sinus rhythm 84  Constitutional: NAD, awake and alert, well-developed  HEENT: Moist Mucous Membranes, Anicteric  Pulmonary: labored, tachypnic, short of breath, diminished breath sounds bilaterally  Cardiovascular: Regular, S1 and S2, No murmurs, rubs, gallops or clicks  Gastrointestinal: Bowel Sounds present, soft, nontender.   Lymph: +1 edema in the left leg, scant in right leg edema  Skin: No visible rashes or ulcers.  Psych:  Mood & affect appropriate  LABS: All Labs Reviewed:                        10.0   13.77 )-----------( 308      ( 19 Oct 2020 09:26 )             32.6                         9.9    12.45 )-----------( 322      ( 18 Oct 2020 09:51 )             32.5                         9.9    15.00 )-----------( 350      ( 17 Oct 2020 12:11 )             33.4     19 Oct 2020 09:26    140    |  96     |  35     ----------------------------<  141    4.0     |  37     |  1.10   18 Oct 2020 09:51    140    |  96     |  34     ----------------------------<  167    4.0     |  40     |  1.00   17 Oct 2020 12:11    138    |  97     |  32     ----------------------------<  169    4.6     |  35     |  0.98     Ca    9.4        19 Oct 2020 09:26  Ca    9.3        18 Oct 2020 09:51  Ca    9.2        17 Oct 2020 12:11    TPro  7.0    /  Alb  2.7    /  TBili  0.4    /  DBili  x      /  AST  13     /  ALT  19     /  AlkPhos  69     19 Oct 2020 09:26  TPro  6.9    /  Alb  2.7    /  TBili  0.4    /  DBili  x      /  AST  14     /  ALT  20     /  AlkPhos  69     18 Oct 2020 09:51  TPro  7.0    /  Alb  2.7    /  TBili  0.3    /  DBili  x      /  AST  15     /  ALT  20     /  AlkPhos  73     17 Oct 2020 12:11          INTERPRETATION:  INDICATION: Rule out infectious endocarditis  Sonographer AS    Blood Pressure 135/74    Height 162.6 cm     Weight 76.2 kg       BSA 1.8 sq m    Dimensions:  LA 3.7       Normal Values: 2.0 - 4.0 cm  Ao 2.7        Normal Values: 2.0 - 3.8 cm  SEPTUM 1.4       Normal Values: 0.6 - 1.2 cm  PWT 1.3       Normal Values: 0.6 - 1.1 cm  LVIDd 3.9         Normal Values: 3.0 - 5.6 cm  LVIDs 2.8         Normal Values: 1.8 - 4.0 cm        OBSERVATIONS:  Technically difficult and limited study  Mitral Valve: Thickened leaflets, mild MR.  Aortic Valve/Aorta: Aortic valve is not well-visualized, grossly normal function without severe pathology  Tricuspid Valve: normal with trace TR.  Pulmonic Valve: Not well-visualized  Left Atrium: normal  Right Atrium: Not well-visualized  Left Ventricle: normal LV size and systolic function, estimated LVEF of 55%. Mild LVH. Endocardium is not well-visualized, cannot rule out segmental dysfunction  Right Ventricle: Grossly normal size and systolic function.  Pericardium/Pleura: normal, no significant pericardial effusion.  Pulmonary/RV Pressure: estimated PA systolic pressure of 15.7 mmHg assuming an RA pressure of 3 mmHg.  LV diastolic dysfunction is present    Conclusion:  Technically difficult and limited study  Mild concentric LVH with grossly normal left ventricular systolic function, estimated LVEF of 55%.  Grossly normal RV size and systolic function.  Aortic valve is not well-visualized, grossly normal function without severe pathology  Mild MR  Trace TR.  No significant pericardial effusion.      CARINA COVARRUBIAS   This document has been electronically signed. Oct  7 2020  8:35AM      EXAM:  XR CHEST PORTABLE IMMED 1V                            PROCEDURE DATE:  10/10/2020      INTERPRETATION:  INDICATION: Pneumonia; follow-up assessment.    PRIORS: 10/8/2020.    VIEWS: Portable AP radiography of the chest performed.    FINDINGS: Heart size appears within normal limits. No hilar or superior mediastinal abnormalities are identified. Infiltrates within the bilateral mid to lower lung fields are without significant change. No pleural effusion or pneumothorax is demonstrated.No mediastinal shift is noted. The visualized osseous structures appear unremarkable.    IMPRESSION: No significant interval change.    GUILLE BOBBY M.D., ATTENDING RADIOLOGIST  This document has been electronically signed. Oct 10 2020  9:57AM       Ventricular Rate 109 BPM    Atrial Rate 109 BPM    P-R Interval 150 ms    QRS Duration 136 ms    Q-T Interval 390 ms    QTC Calculation(Bazett) 525 ms    P Axis 56 degrees    R Axis -16 degrees    T Axis 57 degrees    Diagnosis Line Sinus tachycardia with premature supraventricular complexes  Right bundle branch block  Abnormal ECG  Confirmed by LUIS ENRIQUE CORMIER ([id number]) on 10/8/2020 11:41:04 AM

## 2020-10-19 NOTE — CHART NOTE - NSCHARTNOTEFT_GEN_A_CORE
Assessment:   Pt seen for follow up nutrition assessment. Pt is a 59 yo female with a PMH of endstage COPD on 3LNC (pending transplant list at [facility]), CAD, Afib, uncontrolled T2DM (on insulin), ascorbic acid deficiency, iron deficiency anemia, constipation, GERD, and HTN. Admitted for COPD exacerbation and PNA.   Pt visited at bedside. Pt reports good appetite and PO intake. Pt states she is consuming 100% of the meals. Pt denies any n/v/diarrhea. Pt reports previous constipation however is on bowel regimen and is now moving her bowels regularly. Last BM: 10/18. Edema present on left and right feet. No pressure injuries noted at this time. Pt wt 167.9lbs on 10/6 and 172lbs on 10/18. Weight fluctuation likely due to fluid retention.     Factors impacting intake: [ x ] none     Diet Presciption: Diet, DASH/TLC:   Sodium & Cholesterol Restricted  Consistent Carbohydrate {Evening Snack}  No Concentrated Potassium  No Concentrated Phosphorus (10-09-20 @ 18:28)    Intake: Good (>75%) per pt, EMR    Current Weight: 172.8lbs (10/18)  % Weight Change    Pertinent Medications: MEDICATIONS  (STANDING):  apixaban 5 milliGRAM(s) Oral every 12 hours  aspirin  chewable 81 milliGRAM(s) Oral daily  atorvastatin 80 milliGRAM(s) Oral at bedtime  azithromycin   Tablet 500 milliGRAM(s) Oral daily  Biotene Dry Mouth Oral Rinse 5 milliLiter(s) Swish and Spit two times a day  budesonide 160 MICROgram(s)/formoterol 4.5 MICROgram(s) Inhaler 2 Puff(s) Inhalation two times a day  buMETAnide 0.5 milliGRAM(s) Oral daily  cholecalciferol 1000 Unit(s) Oral daily  dextrose 5%. 1000 milliLiter(s) (50 mL/Hr) IV Continuous <Continuous>  dextrose 50% Injectable 12.5 Gram(s) IV Push once  dextrose 50% Injectable 25 Gram(s) IV Push once  dextrose 50% Injectable 25 Gram(s) IV Push once  diltiazem    milliGRAM(s) Oral daily  ferrous    sulfate 325 milliGRAM(s) Oral daily  guaiFENesin  milliGRAM(s) Oral every 12 hours  insulin glargine Injectable (LANTUS) 12 Unit(s) SubCutaneous at bedtime  insulin lispro (HumaLOG) corrective regimen sliding scale   SubCutaneous three times a day before meals  insulin lispro (HumaLOG) corrective regimen sliding scale   SubCutaneous at bedtime  insulin lispro Injectable (HumaLOG) 4 Unit(s) SubCutaneous three times a day before meals  ipratropium    for Nebulization 500 MICROGram(s) Nebulizer every 6 hours  iron sucrose Injectable 100 milliGRAM(s) IV Push every 24 hours  montelukast 10 milliGRAM(s) Oral at bedtime  multivitamin 1 Tablet(s) Oral daily  pantoprazole    Tablet 40 milliGRAM(s) Oral before breakfast  polyethylene glycol 3350 17 Gram(s) Oral daily  predniSONE   Tablet 20 milliGRAM(s) Oral daily  senna 2 Tablet(s) Oral at bedtime  tiotropium 18 MICROgram(s) Capsule 1 Capsule(s) Inhalation daily    MEDICATIONS  (PRN):  acetaminophen   Tablet .. 650 milliGRAM(s) Oral every 6 hours PRN Mild Pain (1 - 3)  ALPRAZolam 0.25 milliGRAM(s) Oral every 8 hours PRN anxiety  bisacodyl Suppository 10 milliGRAM(s) Rectal daily PRN Constipation  dextrose 40% Gel 15 Gram(s) Oral once PRN Blood Glucose LESS THAN 70 milliGRAM(s)/deciliter  glucagon  Injectable 1 milliGRAM(s) IntraMuscular once PRN Glucose LESS THAN 70 milligrams/deciliter  lactulose Syrup 15 Gram(s) Oral two times a day PRN constipation    Pertinent Labs: 10-19 Na140 mmol/L Glu 141 mg/dL<H> K+ 4.0 mmol/L Cr  1.10 mg/dL BUN 35 mg/dL<H> 10-15 Phos 3.3 mg/dL 10-19 Alb 2.7 g/dL<L>     CAPILLARY BLOOD GLUCOSE      POCT Blood Glucose.: 149 mg/dL (19 Oct 2020 07:57)  POCT Blood Glucose.: 188 mg/dL (18 Oct 2020 21:20)  POCT Blood Glucose.: 169 mg/dL (18 Oct 2020 17:05)  POCT Blood Glucose.: 283 mg/dL (18 Oct 2020 12:09)    Skin: Edema noted on left and right foot. No pressure injuries noted at this time.    Estimated Needs:   [ x ] no change since previous assessment      Previous Nutrition Diagnosis:   [ x ] Unintended Weight Gain     Nutrition Diagnosis is [ x ] ongoing - Being addressed with sodium restricted diet    New Nutrition Diagnosis: [ x ] not applicable       Interventions:   Recommend  [ ] Change Diet To:  [ ] Nutrition Supplement  [ ] Nutrition Support  [ x ] Other: Continue with DASH/TLC and Consistent carbohydrate diet as per MD orders    Monitoring and Evaluation:   [ x ] PO intake [ x ] Tolerance to diet prescription [ x ] weights [ x ] labs[ x ] follow up per protocol  [ x ] other: Edema Assessment:   Pt seen for follow up nutrition assessment. Pt is a 59 yo female with a PMH of endstage COPD on 3LNC (pending [facility] list at [facility]), CAD, Afib, uncontrolled T2DM (on insulin), ascorbic acid deficiency, iron deficiency anemia, constipation, GERD, and HTN. Admitted for COPD exacerbation and PNA.   Pt visited at bedside. Pt reports good appetite and PO intake. Pt states she is consuming 100% of the meals. Pt denies any n/v/diarrhea. Pt reports previous constipation however is on bowel regimen and is now moving her bowels regularly. Last BM: 10/18. Edema present on left and right feet. No pressure injuries noted at this time. Pt wt 167.9lbs on 10/6 and 172lbs on 10/18. Weight fluctuation likely due to fluid retention.     Factors impacting intake: [ x ] none     Diet Presciption: Diet, DASH/TLC:   Sodium & Cholesterol Restricted  Consistent Carbohydrate {Evening Snack}  No Concentrated Potassium  No Concentrated Phosphorus (10-09-20 @ 18:28)    Intake: Good (>75%) per pt, EMR    Current Weight: 172.8lbs (10/18)  % Weight Change    Pertinent Medications: MEDICATIONS  (STANDING):  apixaban 5 milliGRAM(s) Oral every 12 hours  aspirin  chewable 81 milliGRAM(s) Oral daily  atorvastatin 80 milliGRAM(s) Oral at bedtime  azithromycin   Tablet 500 milliGRAM(s) Oral daily  Biotene Dry Mouth Oral Rinse 5 milliLiter(s) Swish and Spit two times a day  budesonide 160 MICROgram(s)/formoterol 4.5 MICROgram(s) Inhaler 2 Puff(s) Inhalation two times a day  buMETAnide 0.5 milliGRAM(s) Oral daily  cholecalciferol 1000 Unit(s) Oral daily  dextrose 5%. 1000 milliLiter(s) (50 mL/Hr) IV Continuous <Continuous>  dextrose 50% Injectable 12.5 Gram(s) IV Push once  dextrose 50% Injectable 25 Gram(s) IV Push once  dextrose 50% Injectable 25 Gram(s) IV Push once  diltiazem    milliGRAM(s) Oral daily  ferrous    sulfate 325 milliGRAM(s) Oral daily  guaiFENesin  milliGRAM(s) Oral every 12 hours  insulin glargine Injectable (LANTUS) 12 Unit(s) SubCutaneous at bedtime  insulin lispro (HumaLOG) corrective regimen sliding scale   SubCutaneous three times a day before meals  insulin lispro (HumaLOG) corrective regimen sliding scale   SubCutaneous at bedtime  insulin lispro Injectable (HumaLOG) 4 Unit(s) SubCutaneous three times a day before meals  ipratropium    for Nebulization 500 MICROGram(s) Nebulizer every 6 hours  iron sucrose Injectable 100 milliGRAM(s) IV Push every 24 hours  montelukast 10 milliGRAM(s) Oral at bedtime  multivitamin 1 Tablet(s) Oral daily  pantoprazole    Tablet 40 milliGRAM(s) Oral before breakfast  polyethylene glycol 3350 17 Gram(s) Oral daily  predniSONE   Tablet 20 milliGRAM(s) Oral daily  senna 2 Tablet(s) Oral at bedtime  tiotropium 18 MICROgram(s) Capsule 1 Capsule(s) Inhalation daily    MEDICATIONS  (PRN):  acetaminophen   Tablet .. 650 milliGRAM(s) Oral every 6 hours PRN Mild Pain (1 - 3)  ALPRAZolam 0.25 milliGRAM(s) Oral every 8 hours PRN anxiety  bisacodyl Suppository 10 milliGRAM(s) Rectal daily PRN Constipation  dextrose 40% Gel 15 Gram(s) Oral once PRN Blood Glucose LESS THAN 70 milliGRAM(s)/deciliter  glucagon  Injectable 1 milliGRAM(s) IntraMuscular once PRN Glucose LESS THAN 70 milligrams/deciliter  lactulose Syrup 15 Gram(s) Oral two times a day PRN constipation    Pertinent Labs: 10-19 Na140 mmol/L Glu 141 mg/dL<H> K+ 4.0 mmol/L Cr  1.10 mg/dL BUN 35 mg/dL<H> 10-15 Phos 3.3 mg/dL 10-19 Alb 2.7 g/dL<L>     CAPILLARY BLOOD GLUCOSE      POCT Blood Glucose.: 149 mg/dL (19 Oct 2020 07:57)  POCT Blood Glucose.: 188 mg/dL (18 Oct 2020 21:20)  POCT Blood Glucose.: 169 mg/dL (18 Oct 2020 17:05)  POCT Blood Glucose.: 283 mg/dL (18 Oct 2020 12:09)    Skin: Edema noted on left and right foot. No pressure injuries noted at this time.    Estimated Needs:   [ x ] no change since previous assessment      Previous Nutrition Diagnosis:   [ x ] Unintended Weight Gain     Nutrition Diagnosis is [ x ] ongoing - Being addressed with sodium restricted diet    New Nutrition Diagnosis: [ x ] not applicable       Interventions:   Recommend  [ ] Change Diet To:  [ ] Nutrition Supplement  [ ] Nutrition Support  [ x ] Other: Continue with DASH/TLC and Consistent carbohydrate diet as per MD orders. Continue with vitamin D3, Iron, and multivitamin supplementation per MD orders.    Monitoring and Evaluation:   [ x ] PO intake [ x ] Tolerance to diet prescription [ x ] weights [ x ] labs[ x ] follow up per protocol  [ x ] other: Edema

## 2020-10-19 NOTE — PROGRESS NOTE ADULT - PROBLEM SELECTOR PLAN 3
Resolved  - likely 2/2 to prerenal vs hypoxemic  - resume bumetanide as needed  - trend renal indices  SPEP with gamma migrating protein seen -- HemeOnc consulted to r/o Multiple myeloma, MGUS; f/u recs Resolved  - likely 2/2 to prerenal vs hypoxemic  - trend renal indices  SPEP with gamma migrating protein seen -- HemeOnc consulted to r/o Multiple myeloma, MGUS; f/u recs

## 2020-10-19 NOTE — CHART NOTE - NSCHARTNOTEFT_GEN_A_CORE
Patient w/ Chronic Respiratory Failure from end stage COPD, will need NIV Therapy at home upon discharge. ABG results recorded on 9/9/2020 while on BIPAP recorded a PCO2 of 100, indicating patient still has severe CO2 retention ruling out traditional BIPAP. NIV with AVAPS-AE therapy has more adaptive settings to better accommodate patient's fragile respiratory state and ventilatory needs which would be better suited for addressing CO2 retention as well. Without NIV therapy patient's fragile respiratory state may worsen leading to patient harm and additional hospitalizations.

## 2020-10-19 NOTE — PROGRESS NOTE ADULT - SUBJECTIVE AND OBJECTIVE BOX
PULMONARY/CRITICAL CARE      INTERVAL HPI/OVERNIGHT EVENTS:  Unchanged. Still sob. Off antibiotics.  Tried ambulating.   60y FemaleHPI:  60F w/ end stage COPD on 3LNC (pending transplant list at [facility]), former smoker, CAD s/p stent (2016), afib on eliquis, uncontrolled DM2 (on insulin), raynaud phenomenon and recent hospitalization at [facility] for confusion and [facility] p/w sob. Sent from [facility]. Patient states that she has had worsening shortness of breath for past two days. Unable to obtain comprehensive history due to dyspnea. Patient denies fever, chills, sore throat, cough, chest pain, palpitations, abd pain, n/v. Currently feels short of breath even after receiving duonebs and steroids in the ED. Unable to keep mask on during interview and lay back in stretcher due to subjective sob. Patient repeatedly stating that it is too hot in her room and fanning herself with a paper. Per chart, Dr. Mathew (PCP) has chart notes regarding possible hallucinations. Would like her home medications now but otherwise has no acute complaints.    In the ED, VS T97.7F  /78 RR 21 SpO2 94% on 4LNC. Labs significant for mild leukocytosis of 11.41, H/H 9.9/30.5, thrombophilia of 435. CO2 35, albumin 2.4.   CXR done showing b/l patchy infiltrates, official read pending  CT chest done showing multifocal PNA and reactive mediastinal lymphadenopathy  EKG read limited by artifact, sinus tachycardia with RBBB and premature supraventricular complexes (06 Oct 2020 04:55)        PAST MEDICAL & SURGICAL HISTORY:  Ascorbic acid deficiency    Iron deficiency anemia    Constipation    GERD without esophagitis    Type 2 diabetes mellitus    HTN (hypertension)    Heart failure    End stage COPD  on 3LNC    Atrial fibrillation    Hyperlipemia    Chronic sinusitis    Raynaud phenomenon    Claustrophobia    COPD (chronic obstructive pulmonary disease)    S/P tonsillectomy          ICU Vital Signs Last 24 Hrs  T(C): 36.7 (12 Oct 2020 05:11), Max: 36.8 (11 Oct 2020 13:06)  T(F): 98 (12 Oct 2020 05:11), Max: 98.3 (11 Oct 2020 13:06)  HR: 70 (12 Oct 2020 08:09) (70 - 90)  BP: 138/78 (12 Oct 2020 05:11) (121/73 - 138/78)  BP(mean): --  ABP: --  ABP(mean): --  RR: 17 (12 Oct 2020 05:11) (17 - 22)  SpO2: 99% (12 Oct 2020 08:09) (94% - 100%)    Qtts:     I&O's Summary    11 Oct 2020 07:01  -  12 Oct 2020 07:00  --------------------------------------------------------  IN: 50 mL / OUT: 800 mL / NET: -750 mL              PHYSICAL EXAM:    GENERAL: NAD, well-groomed, well-developed, NAD  HEAD:  Atraumatic, Normocephalic  EYES: EOMI, PERRLA, conjunctiva and sclera clear  ENMT: No tonsillar erythema, exudates, or enlargement; Moist mucous membranes, Good dentition, No lesions  NECK: Supple, No JVD, Normal thyroid  NERVOUS SYSTEM:  Alert & Oriented X3, Good concentration; Motor Strength 5/5 B/L upper and lower extremities  CHEST/LUNG: Clear to percussion bilaterally; No rales, rhonchi, wheezing, or rubs Decreased bs  HEART: Regular rate and rhythm; No murmurs, rubs, or gallops  ABDOMEN: Soft, Nontender, Nondistended; Bowel sounds present  EXTREMITIES:  2+ Peripheral Pulses, No clubbing, cyanosis, or edema  LYMPH: No lymphadenopathy noted  SKIN: No rashes or lesions        LABS:                        8.9    10.88 )-----------( 483      ( 11 Oct 2020 07:04 )             30.4     10-11    139  |  99  |  36<H>  ----------------------------<  317<H>  4.4   |  34<H>  |  0.94    Ca    9.2      11 Oct 2020 07:04    TPro  6.3  /  Alb  2.4<L>  /  TBili  0.2  /  DBili  x   /  AST  12<L>  /  ALT  16  /  AlkPhos  70  10-11          vanco through     RADIOLOGY & ADDITIONAL STUDIES:      CRITICAL CARE TIME SPENT:

## 2020-10-19 NOTE — PROGRESS NOTE ADULT - PROBLEM SELECTOR PLAN 2
on 3LNC at rehab, currently requiring 5L NC with BIPAP, wean as tolerated  - continue spiriva, symbicort, montelukast, inhaler PRN, atrovent   -theophylline held given tachycardia and brief afib  - BIPAP qhs and PRN -- pt counseled to use more frequently   -of note pt is not on transplant list yet, is planning to go on list at [facility] if medically stable and improved

## 2020-10-19 NOTE — PROGRESS NOTE ADULT - ASSESSMENT
[ASSESSMENT and  PLAN]  60F w/ end stage COPD on 3LNC (trying to get on  transplant list at [facility]), former smoker, CAD s/p stent (2016), afib on eliquis, DM2 (on insulin), raynaud phenomenon and recent hospitalization at [facility] for confusion and [facility] p/w sob, admitted for acute on chronic resp failure with hypoxia and hyercarbia sec to multifocal PNA and COPD exacerabtion with end stage COPD.     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 would expect Ferritin to be higher than 98 in setting of acute illness)    RECOMMENDATIONS  Tx of pneumonia per pulmonary and ID.   Recommend repeat MGUS studies outpt in 12 weeks to evaluate if faint band disappears post infection.  As pt undergoing txplant evaluation, to have re-eval with hematologist associated with transplant center and will most likely require bone marrow evaluation prior to lung transplant  No additional inpatient heme/onc interventions; will sign off; reconsult if needed  - case discussed with Dr. Hua (hospitalist)

## 2020-10-19 NOTE — PROGRESS NOTE ADULT - ASSESSMENT
60F w/ end stage COPD on 3LNC (trying to get on  transplant list at [facility]), former smoker, CAD s/p stent (2016), afib on eliquis, DM2 (on insulin), raynaud phenomenon and recent hospitalization at [facility] for confusion and [facility] p/w sob, admitted for acute on chronic resp failure with hypoxia and hyercarbia sec to multifocal PNA and COPD exacerabtion with end stage COPD.

## 2020-10-19 NOTE — PROGRESS NOTE ADULT - PROBLEM SELECTOR PLAN 1
likely multifactorial from multifocal PNA in setting of end stage COPD   - slow clinical improvement  - currently on 5L NC with BIPAP qhs -- maintain O2 >/ 88  - Blood culture negative, initial Sputum cx w/ normal respiratory richie  - repeat Sputum cx (10/12) : rare mold-like fungus   - f/u IgE, Aspergillus antigen to r/o abpa  - heme/onc Dr. jaramillo's consulted to R/o MM in setting of sputum culture findings and positive SPEP--recommend repeat MGUS studies outpatient  - ID, Dr. Juarez--Fungitell and galactomannan cx; will hold antifungals for now   - Pulm Dr. Rojas on board, recs appreciated likely multifactorial from multifocal PNA in setting of end stage COPD   - slow clinical improvement  - currently on 5L NC with BIPAP qhs -- maintain O2 >/ 88  - Home dose Bumex resumed (1mg q12)  - Blood culture negative, initial Sputum cx w/ normal respiratory richie  - repeat Sputum cx (10/12) : rare mold-like fungus   - f/u IgE, Aspergillus antigen to r/o abpa  - heme/onc Dr. jaramillo's consulted to R/o MM in setting of sputum culture findings and positive SPEP--recommend repeat MGUS studies outpatient  - ID, Dr. Juarez--Fungitell and galactomannan cx; will hold antifungals for now   - Pulm Dr. Rojas on board, recs appreciated likely multifactorial from multifocal PNA in setting of end stage COPD   - slow clinical improvement  - currently on 5L NC with BIPAP qhs -- maintain O2 >/ 88  - Home dose Bumex resumed (1mg q12)  - Blood culture negative, initial Sputum cx w/ normal respiratory richie  - repeat Sputum cx (10/12) : POSITIVE for rare aspergillus  - f/u IgE, Aspergillus antigen to r/o abpa  - ID, Dr. Juarez on board, recs appreciated  - Pulm Dr. Rojas on board, recs appreciated  - heme/onc Dr. jaramillo's consulted to R/o MM in setting of sputum culture findings and positive SPEP--recommend repeat MGUS studies outpatient

## 2020-10-19 NOTE — PROGRESS NOTE ADULT - ATTENDING COMMENTS
I personally conducted a physical examination of the patient. I personally gathered the patient's history. I edited the above listed findings which were prepared by the listed resident physician. I personally discussed the plan of care with the patient. The questions and concerns were addressed to the best of my ability. The patient is in agreement with the listed treatment plan.    Patient seen and examined. States she feels fine. Awaiting aspergillus antigen, sputum culture with rare aspergillus noted, ID stating may be colonization.

## 2020-10-19 NOTE — PROGRESS NOTE ADULT - SUBJECTIVE AND OBJECTIVE BOX
Patient is a 60y old  Female who presents with a chief complaint of COPD exacerbation, PNA (18 Oct 2020 19:36)      INTERVAL HPI/OVERNIGHT EVENTS: Patient seen and examined at bedside. No events overnight. Patient states that her breathing is similar to yesterday. Notes improvement in her leg swelling. Denies any HA, CP, cough, n/v, or abdominal pain.    MEDICATIONS  (STANDING):  apixaban 5 milliGRAM(s) Oral every 12 hours  aspirin  chewable 81 milliGRAM(s) Oral daily  atorvastatin 80 milliGRAM(s) Oral at bedtime  azithromycin   Tablet 500 milliGRAM(s) Oral daily  Biotene Dry Mouth Oral Rinse 5 milliLiter(s) Swish and Spit two times a day  budesonide 160 MICROgram(s)/formoterol 4.5 MICROgram(s) Inhaler 2 Puff(s) Inhalation two times a day  buMETAnide 0.5 milliGRAM(s) Oral daily  cholecalciferol 1000 Unit(s) Oral daily  dextrose 5%. 1000 milliLiter(s) (50 mL/Hr) IV Continuous <Continuous>  dextrose 50% Injectable 12.5 Gram(s) IV Push once  dextrose 50% Injectable 25 Gram(s) IV Push once  dextrose 50% Injectable 25 Gram(s) IV Push once  diltiazem    milliGRAM(s) Oral daily  ferrous    sulfate 325 milliGRAM(s) Oral daily  guaiFENesin  milliGRAM(s) Oral every 12 hours  insulin glargine Injectable (LANTUS) 12 Unit(s) SubCutaneous at bedtime  insulin lispro (HumaLOG) corrective regimen sliding scale   SubCutaneous three times a day before meals  insulin lispro (HumaLOG) corrective regimen sliding scale   SubCutaneous at bedtime  insulin lispro Injectable (HumaLOG) 4 Unit(s) SubCutaneous three times a day before meals  ipratropium    for Nebulization 500 MICROGram(s) Nebulizer every 6 hours  iron sucrose Injectable 100 milliGRAM(s) IV Push every 24 hours  montelukast 10 milliGRAM(s) Oral at bedtime  multivitamin 1 Tablet(s) Oral daily  pantoprazole    Tablet 40 milliGRAM(s) Oral before breakfast  polyethylene glycol 3350 17 Gram(s) Oral daily  predniSONE   Tablet 30 milliGRAM(s) Oral daily  senna 2 Tablet(s) Oral at bedtime  tiotropium 18 MICROgram(s) Capsule 1 Capsule(s) Inhalation daily    MEDICATIONS  (PRN):  acetaminophen   Tablet .. 650 milliGRAM(s) Oral every 6 hours PRN Mild Pain (1 - 3)  ALPRAZolam 0.25 milliGRAM(s) Oral every 8 hours PRN anxiety  bisacodyl Suppository 10 milliGRAM(s) Rectal daily PRN Constipation  dextrose 40% Gel 15 Gram(s) Oral once PRN Blood Glucose LESS THAN 70 milliGRAM(s)/deciliter  glucagon  Injectable 1 milliGRAM(s) IntraMuscular once PRN Glucose LESS THAN 70 milligrams/deciliter  lactulose Syrup 15 Gram(s) Oral two times a day PRN constipation      Allergies    No Known Allergies    Intolerances    albuterol (Unknown)      REVIEW OF SYSTEMS:  CONSTITUTIONAL: No fever or chills  HEENT:  No headache, no sore throat  RESPIRATORY: No cough, wheezing. Positive for shortness of breath  CARDIOVASCULAR: No chest pain, palpitations  GASTROINTESTINAL: No abd pain, nausea, vomiting, or diarrhea  GENITOURINARY: No dysuria, frequency, or hematuria  NEUROLOGICAL: no focal weakness or dizziness  MUSCULOSKELETAL: no myalgias     Vital Signs Last 24 Hrs  T(C): 36.4 (19 Oct 2020 06:02), Max: 37 (18 Oct 2020 12:43)  T(F): 97.6 (19 Oct 2020 06:02), Max: 98.6 (18 Oct 2020 12:43)  HR: 98 (19 Oct 2020 07:35) (70 - 101)  BP: 135/82 (19 Oct 2020 06:02) (121/81 - 135/82)  BP(mean): --  RR: 20 (19 Oct 2020 06:02) (19 - 20)  SpO2: 93% (19 Oct 2020 07:35) (93% - 100%)    PHYSICAL EXAM:  GENERAL: NAD, laying comfortably in bed, on NC  HEENT:  anicteric, moist mucous membranes  CHEST/LUNG:  Decreased breath sounds in all fields. No rales, wheezes, or rhonchi  HEART:  RRR, S1, S2  ABDOMEN:  BS+, soft, nontender, nondistended  EXTREMITIES: no cyanosis, or calf tenderness. 1+ pitting edema b/l LE. (-) andres's sign.  NERVOUS SYSTEM: answers questions and follows commands appropriately    LABS:                        9.9    12.45 )-----------( 322      ( 18 Oct 2020 09:51 )             32.5     CBC Full  -  ( 18 Oct 2020 09:51 )  WBC Count : 12.45 K/uL  Hemoglobin : 9.9 g/dL  Hematocrit : 32.5 %  Platelet Count - Automated : 322 K/uL  Mean Cell Volume : 81.3 fl  Mean Cell Hemoglobin : 24.8 pg  Mean Cell Hemoglobin Concentration : 30.5 gm/dL  Auto Neutrophil # : 10.89 K/uL  Auto Lymphocyte # : 0.62 K/uL  Auto Monocyte # : 0.53 K/uL  Auto Eosinophil # : 0.23 K/uL  Auto Basophil # : 0.03 K/uL  Auto Neutrophil % : 87.5 %  Auto Lymphocyte % : 5.0 %  Auto Monocyte % : 4.3 %  Auto Eosinophil % : 1.8 %  Auto Basophil % : 0.2 %    18 Oct 2020 09:51    140    |  96     |  34     ----------------------------<  167    4.0     |  40     |  1.00     Ca    9.3        18 Oct 2020 09:51    TPro  6.9    /  Alb  2.7    /  TBili  0.4    /  DBili  x      /  AST  14     /  ALT  20     /  AlkPhos  69     18 Oct 2020 09:51        CAPILLARY BLOOD GLUCOSE      POCT Blood Glucose.: 149 mg/dL (19 Oct 2020 07:57)  POCT Blood Glucose.: 188 mg/dL (18 Oct 2020 21:20)  POCT Blood Glucose.: 169 mg/dL (18 Oct 2020 17:05)  POCT Blood Glucose.: 283 mg/dL (18 Oct 2020 12:09)        Culture - Sputum (collected 10-12-20 @ 16:44)  Source: .Sputum Sputum  Gram Stain (10-12-20 @ 19:22):    No polymorphonuclear leukocytes per low power field    Rare Squamous epithelial cells per low power field    Few Gram Negative Rods per oil power field    Rare Gram positive cocci in pairs per oil power field  Preliminary Report (10-14-20 @ 18:35):    Rare Mold like fungus    Normal Respiratory Samaria present        RADIOLOGY & ADDITIONAL TESTS:    Personally reviewed.     Consultant(s) Notes Reviewed:  [x] YES  [ ] NO

## 2020-10-20 PROBLEM — K21.9 GASTRO-ESOPHAGEAL REFLUX DISEASE WITHOUT ESOPHAGITIS: Chronic | Status: ACTIVE | Noted: 2020-10-06

## 2020-10-20 PROBLEM — I10 ESSENTIAL (PRIMARY) HYPERTENSION: Chronic | Status: ACTIVE | Noted: 2020-10-06

## 2020-10-20 PROBLEM — I50.9 HEART FAILURE, UNSPECIFIED: Chronic | Status: ACTIVE | Noted: 2020-10-06

## 2020-10-20 PROBLEM — E11.9 TYPE 2 DIABETES MELLITUS WITHOUT COMPLICATIONS: Chronic | Status: ACTIVE | Noted: 2020-10-06

## 2020-10-20 PROBLEM — E54 ASCORBIC ACID DEFICIENCY: Chronic | Status: ACTIVE | Noted: 2020-10-06

## 2020-10-20 PROBLEM — D50.9 IRON DEFICIENCY ANEMIA, UNSPECIFIED: Chronic | Status: ACTIVE | Noted: 2020-10-06

## 2020-10-20 PROBLEM — K59.00 CONSTIPATION, UNSPECIFIED: Chronic | Status: ACTIVE | Noted: 2020-10-06

## 2020-10-20 PROBLEM — J44.9 CHRONIC OBSTRUCTIVE PULMONARY DISEASE, UNSPECIFIED: Chronic | Status: ACTIVE | Noted: 2020-10-06

## 2020-10-20 LAB
ALBUMIN SERPL ELPH-MCNC: 2.6 G/DL — LOW (ref 3.3–5)
ALP SERPL-CCNC: 63 U/L — SIGNIFICANT CHANGE UP (ref 40–120)
ALT FLD-CCNC: 19 U/L — SIGNIFICANT CHANGE UP (ref 12–78)
ANION GAP SERPL CALC-SCNC: 2 MMOL/L — LOW (ref 5–17)
AST SERPL-CCNC: 14 U/L — LOW (ref 15–37)
BASOPHILS # BLD AUTO: 0.03 K/UL — SIGNIFICANT CHANGE UP (ref 0–0.2)
BASOPHILS NFR BLD AUTO: 0.3 % — SIGNIFICANT CHANGE UP (ref 0–2)
BILIRUB SERPL-MCNC: 0.5 MG/DL — SIGNIFICANT CHANGE UP (ref 0.2–1.2)
BUN SERPL-MCNC: 34 MG/DL — HIGH (ref 7–23)
CALCIUM SERPL-MCNC: 9.6 MG/DL — SIGNIFICANT CHANGE UP (ref 8.5–10.1)
CHLORIDE SERPL-SCNC: 97 MMOL/L — SIGNIFICANT CHANGE UP (ref 96–108)
CO2 SERPL-SCNC: 41 MMOL/L — HIGH (ref 22–31)
CREAT SERPL-MCNC: 0.9 MG/DL — SIGNIFICANT CHANGE UP (ref 0.5–1.3)
EOSINOPHIL # BLD AUTO: 0.28 K/UL — SIGNIFICANT CHANGE UP (ref 0–0.5)
EOSINOPHIL NFR BLD AUTO: 2.3 % — SIGNIFICANT CHANGE UP (ref 0–6)
GLUCOSE SERPL-MCNC: 154 MG/DL — HIGH (ref 70–99)
HCT VFR BLD CALC: 30.3 % — LOW (ref 34.5–45)
HGB BLD-MCNC: 8.9 G/DL — LOW (ref 11.5–15.5)
IMM GRANULOCYTES NFR BLD AUTO: 1.8 % — HIGH (ref 0–1.5)
LYMPHOCYTES # BLD AUTO: 0.99 K/UL — LOW (ref 1–3.3)
LYMPHOCYTES # BLD AUTO: 8.3 % — LOW (ref 13–44)
MCHC RBC-ENTMCNC: 24.3 PG — LOW (ref 27–34)
MCHC RBC-ENTMCNC: 29.4 GM/DL — LOW (ref 32–36)
MCV RBC AUTO: 82.8 FL — SIGNIFICANT CHANGE UP (ref 80–100)
MONOCYTES # BLD AUTO: 0.7 K/UL — SIGNIFICANT CHANGE UP (ref 0–0.9)
MONOCYTES NFR BLD AUTO: 5.9 % — SIGNIFICANT CHANGE UP (ref 2–14)
NEUTROPHILS # BLD AUTO: 9.73 K/UL — HIGH (ref 1.8–7.4)
NEUTROPHILS NFR BLD AUTO: 81.4 % — HIGH (ref 43–77)
NRBC # BLD: 0 /100 WBCS — SIGNIFICANT CHANGE UP (ref 0–0)
PLATELET # BLD AUTO: 290 K/UL — SIGNIFICANT CHANGE UP (ref 150–400)
POTASSIUM SERPL-MCNC: 4.1 MMOL/L — SIGNIFICANT CHANGE UP (ref 3.5–5.3)
POTASSIUM SERPL-SCNC: 4.1 MMOL/L — SIGNIFICANT CHANGE UP (ref 3.5–5.3)
PROT SERPL-MCNC: 6.5 G/DL — SIGNIFICANT CHANGE UP (ref 6–8.3)
RBC # BLD: 3.66 M/UL — LOW (ref 3.8–5.2)
RBC # FLD: 17.2 % — HIGH (ref 10.3–14.5)
SODIUM SERPL-SCNC: 140 MMOL/L — SIGNIFICANT CHANGE UP (ref 135–145)
WBC # BLD: 11.95 K/UL — HIGH (ref 3.8–10.5)
WBC # FLD AUTO: 11.95 K/UL — HIGH (ref 3.8–10.5)

## 2020-10-20 PROCEDURE — 99232 SBSQ HOSP IP/OBS MODERATE 35: CPT

## 2020-10-20 PROCEDURE — 99232 SBSQ HOSP IP/OBS MODERATE 35: CPT | Mod: GC

## 2020-10-20 RX ORDER — INSULIN LISPRO 100/ML
VIAL (ML) SUBCUTANEOUS
Refills: 0 | Status: DISCONTINUED | OUTPATIENT
Start: 2020-10-20 | End: 2020-10-24

## 2020-10-20 RX ORDER — INSULIN LISPRO 100/ML
VIAL (ML) SUBCUTANEOUS AT BEDTIME
Refills: 0 | Status: DISCONTINUED | OUTPATIENT
Start: 2020-10-20 | End: 2020-10-24

## 2020-10-20 RX ORDER — INSULIN LISPRO 100/ML
4 VIAL (ML) SUBCUTANEOUS
Refills: 0 | Status: DISCONTINUED | OUTPATIENT
Start: 2020-10-20 | End: 2020-10-24

## 2020-10-20 RX ADMIN — Medication 600 MILLIGRAM(S): at 17:45

## 2020-10-20 RX ADMIN — Medication 500 MICROGRAM(S): at 00:52

## 2020-10-20 RX ADMIN — INSULIN GLARGINE 12 UNIT(S): 100 INJECTION, SOLUTION SUBCUTANEOUS at 22:02

## 2020-10-20 RX ADMIN — Medication 325 MILLIGRAM(S): at 12:32

## 2020-10-20 RX ADMIN — BUDESONIDE AND FORMOTEROL FUMARATE DIHYDRATE 2 PUFF(S): 160; 4.5 AEROSOL RESPIRATORY (INHALATION) at 07:30

## 2020-10-20 RX ADMIN — Medication 4 UNIT(S): at 08:53

## 2020-10-20 RX ADMIN — SENNA PLUS 2 TABLET(S): 8.6 TABLET ORAL at 21:59

## 2020-10-20 RX ADMIN — Medication 4 UNIT(S): at 12:33

## 2020-10-20 RX ADMIN — Medication 1000 UNIT(S): at 12:32

## 2020-10-20 RX ADMIN — Medication 600 MILLIGRAM(S): at 06:21

## 2020-10-20 RX ADMIN — Medication 2: at 08:53

## 2020-10-20 RX ADMIN — MONTELUKAST 10 MILLIGRAM(S): 4 TABLET, CHEWABLE ORAL at 21:59

## 2020-10-20 RX ADMIN — APIXABAN 5 MILLIGRAM(S): 2.5 TABLET, FILM COATED ORAL at 06:21

## 2020-10-20 RX ADMIN — Medication 500 MICROGRAM(S): at 19:50

## 2020-10-20 RX ADMIN — TIOTROPIUM BROMIDE 1 CAPSULE(S): 18 CAPSULE ORAL; RESPIRATORY (INHALATION) at 22:01

## 2020-10-20 RX ADMIN — BUMETANIDE 1 MILLIGRAM(S): 0.25 INJECTION INTRAMUSCULAR; INTRAVENOUS at 17:45

## 2020-10-20 RX ADMIN — Medication 240 MILLIGRAM(S): at 06:21

## 2020-10-20 RX ADMIN — BUDESONIDE AND FORMOTEROL FUMARATE DIHYDRATE 2 PUFF(S): 160; 4.5 AEROSOL RESPIRATORY (INHALATION) at 18:37

## 2020-10-20 RX ADMIN — Medication 1 TABLET(S): at 17:45

## 2020-10-20 RX ADMIN — BUMETANIDE 1 MILLIGRAM(S): 0.25 INJECTION INTRAMUSCULAR; INTRAVENOUS at 06:21

## 2020-10-20 RX ADMIN — AZITHROMYCIN 500 MILLIGRAM(S): 500 TABLET, FILM COATED ORAL at 12:32

## 2020-10-20 RX ADMIN — Medication 20 MILLIGRAM(S): at 06:22

## 2020-10-20 RX ADMIN — Medication 6: at 12:32

## 2020-10-20 RX ADMIN — Medication 500 MICROGRAM(S): at 08:31

## 2020-10-20 RX ADMIN — PANTOPRAZOLE SODIUM 40 MILLIGRAM(S): 20 TABLET, DELAYED RELEASE ORAL at 08:54

## 2020-10-20 RX ADMIN — Medication 81 MILLIGRAM(S): at 12:32

## 2020-10-20 RX ADMIN — ATORVASTATIN CALCIUM 80 MILLIGRAM(S): 80 TABLET, FILM COATED ORAL at 21:59

## 2020-10-20 RX ADMIN — POLYETHYLENE GLYCOL 3350 17 GRAM(S): 17 POWDER, FOR SOLUTION ORAL at 12:32

## 2020-10-20 RX ADMIN — APIXABAN 5 MILLIGRAM(S): 2.5 TABLET, FILM COATED ORAL at 17:45

## 2020-10-20 RX ADMIN — Medication 4 UNIT(S): at 17:45

## 2020-10-20 RX ADMIN — TIOTROPIUM BROMIDE 1 CAPSULE(S): 18 CAPSULE ORAL; RESPIRATORY (INHALATION) at 07:30

## 2020-10-20 RX ADMIN — Medication 500 MICROGRAM(S): at 14:23

## 2020-10-20 RX ADMIN — Medication 2: at 17:44

## 2020-10-20 NOTE — PROGRESS NOTE ADULT - ASSESSMENT
60F w/ end stage COPD on 3LNC (trying to get on  transplant list at [facility]), former smoker, CAD s/p stent (2016), afib on eliquis, DM2 (on insulin), raynaud phenomenon and recent hospitalization at [facility] for confusion and [facility] p/w sob, admitted for acute on chronic resp failure with hypoxia and hyercarbia sec to multifocal PNA and COPD exacerabtion with end stage COPD.

## 2020-10-20 NOTE — PROGRESS NOTE ADULT - PROBLEM SELECTOR PLAN 2
on 3LNC at rehab, currently requiring 5L NC with BIPAP, wean as tolerated  - continue spiriva, symbicort, montelukast, inhaler PRN, atrovent   -theophylline held given tachycardia and brief afib  - BIPAP qhs and PRN -- pt counseled to use more frequently   -of note pt is not on transplant list yet, is planning to go on list at [facility] if medically stable and improved

## 2020-10-20 NOTE — PROGRESS NOTE ADULT - PROBLEM SELECTOR PLAN 1
likely multifactorial from multifocal PNA in setting of end stage COPD   - slow clinical improvement  - currently on 5L NC with BIPAP qhs -- maintain O2 >/ 88  - Home dose Bumex resumed (1mg q12)  - Blood culture negative, initial Sputum cx w/ normal respiratory richie  - repeat Sputum cx (10/12) : POSITIVE for rare aspergillus  - Aspergillus antigen WNL  - ID, Dr. Juarez on board, recs appreciated  - Pulm Dr. Rojas on board, recs appreciated  - heme/onc Dr. jaramillo's consulted to R/o MM in setting of sputum culture findings and positive SPEP--recommend repeat MGUS studies outpatient  - pending approval for Trilogy machine for home

## 2020-10-20 NOTE — PROGRESS NOTE ADULT - ASSESSMENT
Pt. with endstage COPD on transplant list, with acute pneumonia.  Needs extensive PT, but wants to go home eventually.   Agree with need for nocturnal NIV, likely Trilogy.  Improved clinically.  Fungus in sputum colonization.  Will need extensive PT.     FU Dr Mathew as outpt.   Can taper steroids.     Trial atrovent hhn which helped.   Inhalers.  PT.

## 2020-10-20 NOTE — PROGRESS NOTE ADULT - SUBJECTIVE AND OBJECTIVE BOX
[facility] -- Adriana Gonzales, Gina, Funmilayo, Dylan Cormier, Paz Plunkett: Office # [phone]    Follow Up:  SOB    Subjective/Observations: Patient seen and examined. Patient awake, alert, resting in bed. No complaints of chest pain, dyspnea, palpitations or dizziness. + NIELSON    REVIEW OF SYSTEMS: All review of systems is negative for eye, ENT, GI, , allergic, dermatologic, musculoskeletal and neurologic except as described above    PAST MEDICAL & SURGICAL HISTORY:  Ascorbic acid deficiency  Iron deficiency anemia  Constipation  GERD without esophagitis  Type 2 diabetes mellitus  HTN hypertension)  Heart failure  End stage COPD  on 3LNC  Atrial fibrillation  Hyperlipemia  Chronic sinusitis  Raynaud phenomenon  Claustrophobia  COPD (chronic obstructive pulmonary disease)  S/P tonsillectomy    MEDICATIONS  (STANDING):  apixaban 5 milliGRAM(s) Oral every 12 hours  aspirin  chewable 81 milliGRAM(s) Oral daily  atorvastatin 80 milliGRAM(s) Oral at bedtime  azithromycin   Tablet 500 milliGRAM(s) Oral daily  Biotene Dry Mouth Oral Rinse 5 milliLiter(s) Swish and Spit two times a day  budesonide 160 MICROgram(s)/formoterol 4.5 MICROgram(s) Inhaler 2 Puff(s) Inhalation two times a day  buMETAnide 1 milliGRAM(s) Oral two times a day  cholecalciferol 1000 Unit(s) Oral daily  dextrose 5%. 1000 milliLiter(s) (50 mL/Hr) IV Continuous <Continuous>  dextrose 50% Injectable 12.5 Gram(s) IV Push once  dextrose 50% Injectable 25 Gram(s) IV Push once  dextrose 50% Injectable 25 Gram(s) IV Push once  diltiazem    milliGRAM(s) Oral daily  ferrous    sulfate 325 milliGRAM(s) Oral daily  guaiFENesin  milliGRAM(s) Oral every 12 hours  insulin glargine Injectable (LANTUS) 12 Unit(s) SubCutaneous at bedtime  insulin lispro (ADMELOG) corrective regimen sliding scale   SubCutaneous at bedtime  insulin lispro (ADMELOG) corrective regimen sliding scale.   SubCutaneous three times a day before meals  insulin lispro Injectable (ADMELOG) 4 Unit(s) SubCutaneous three times a day before meals  ipratropium    for Nebulization 500 MICROGram(s) Nebulizer every 6 hours  montelukast 10 milliGRAM(s) Oral at bedtime  multivitamin 1 Tablet(s) Oral daily  pantoprazole    Tablet 40 milliGRAM(s) Oral before breakfast  polyethylene glycol 3350 17 Gram(s) Oral daily  predniSONE   Tablet 20 milliGRAM(s) Oral daily  senna 2 Tablet(s) Oral at bedtime  tiotropium 18 MICROgram(s) Capsule 1 Capsule(s) Inhalation daily    MEDICATIONS  (PRN):  acetaminophen   Tablet .. 650 milliGRAM(s) Oral every 6 hours PRN Mild Pain (1 - 3)  ALPRAZolam 0.25 milliGRAM(s) Oral every 8 hours PRN anxiety  bisacodyl Suppository 10 milliGRAM(s) Rectal daily PRN Constipation  dextrose 40% Gel 15 Gram(s) Oral once PRN Blood Glucose LESS THAN 70 milliGRAM(s)/deciliter  glucagon  Injectable 1 milliGRAM(s) IntraMuscular once PRN Glucose LESS THAN 70 milligrams/deciliter  lactulose Syrup 15 Gram(s) Oral two times a day PRN constipation    Allergies  No Known Allergies    Intolerances  albuterol (Unknown)    Vital Signs Last 24 Hrs  T(C): 36.9 (20 Oct 2020 12:41), Max: 36.9 (20 Oct 2020 12:41)  T(F): 98.5 (20 Oct 2020 12:41), Max: 98.5 (20 Oct 2020 12:41)  HR: 78 (20 Oct 2020 12:41) (70 - 89)  BP: 152/76 (20 Oct 2020 12:41) (128/68 - 152/76)  BP(mean): --  RR: 20 (20 Oct 2020 12:41) (17 - 20)  SpO2: 98% (20 Oct 2020 12:41) (95% - 99%)    I&O's Summary    19 Oct 2020 07:01  -  20 Oct 2020 07:00  --------------------------------------------------------  IN: 0 mL / OUT: 2250 mL / NET: -2250 mL    20 Oct 2020 07:01  -  20 Oct 2020 12:42  --------------------------------------------------------  IN: 0 mL / OUT: 1400 mL / NET: -1400 mL    TELE:  70-80s   PHYSICAL EXAM:  Appearance: NAD, no distress, alert, Well developed   HEENT: Moist Mucous Membranes, Anicteric  Cardiovascular: Regular rate and rhythm, Normal S1 S2, No JVD, No murmurs, No rubs, gallops or clicks  Respiratory: Non-labored, Clear to auscultation, Diminished at bases, No rales, No rhonchi, No wheezing.   Gastrointestinal:  Soft, Non-tender, + BS  Neurologic: Non-focal  Skin: Warm and dry, No visible rashes or ulcers, No ecchymosis, No cyanosis  Musculoskeletal: No clubbing, No cyanosis, No joint swelling/tenderness  Psychiatry: Mood & affect appropriate  Lymph: No peripheral edema.     LABS: All Labs Reviewed:                        8.9    11.95 )-----------( 290      ( 20 Oct 2020 08:26 )             30.3                         10.0   13.77 )-----------( 308      ( 19 Oct 2020 09:26 )             32.6                         9.9    12.45 )-----------( 322      ( 18 Oct 2020 09:51 )             32.5     20 Oct 2020 08:26    140    |  97     |  34     ----------------------------<  154    4.1     |  41     |  0.90   19 Oct 2020 09:26    140    |  96     |  35     ----------------------------<  141    4.0     |  37     |  1.10   18 Oct 2020 09:51    140    |  96     |  34     ----------------------------<  167    4.0     |  40     |  1.00     Ca    9.6        20 Oct 2020 08:26  Ca    9.4        19 Oct 2020 09:26  Ca    9.3        18 Oct 2020 09:51    TPro  6.5    /  Alb  2.6    /  TBili  0.5    /  DBili  x      /  AST  14     /  ALT  19     /  AlkPhos  63     20 Oct 2020 08:26  TPro  7.0    /  Alb  2.7    /  TBili  0.4    /  DBili  x      /  AST  13     /  ALT  19     /  AlkPhos  69     19 Oct 2020 09:26  TPro  6.9    /  Alb  2.7    /  TBili  0.4    /  DBili  x      /  AST  14     /  ALT  20     /  AlkPhos  69     18 Oct 2020 09:51    12 Lead ECG:   Ventricular Rate 109 BPM  Atrial Rate 109 BPM  P-R Interval 150 ms  QRS Duration 136 ms  Q-T Interval 390 ms  QTC Calculation(Bazett) 525 ms  P Axis 56 degrees  R Axis -16 degrees  T Axis 57 degrees  Diagnosis Line Sinus tachycardia with premature supraventricular complexes  Right bundle branch block  Abnormal ECG  Confirmed by LUIS ENRIQUE CORMIER ([id number]) on 10/8/2020 11:41:04 AM (10-08-20 @ 08:51)    < from: TTE Echo Complete w/o Contrast w/ Doppler (10.06.20 @ 17:10) >  Dimensions:  LA 3.7       Normal Values: 2.0 - 4.0 cm  Ao 2.7        Normal Values: 2.0 - 3.8 cm  SEPTUM 1.4       Normal Values: 0.6 - 1.2 cm  PWT 1.3       Normal Values: 0.6 - 1.1 cm  LVIDd 3.9         Normal Values: 3.0 - 5.6 cm  LVIDs 2.8         Normal Values: 1.8 - 4.0 cm    OBSERVATIONS:  Technically difficult and limited study  Mitral Valve: Thickened leaflets, mild MR.  Aortic Valve/Aorta: Aortic valve is not well-visualized, grossly normal function without severe pathology  Tricuspid Valve: normal with trace TR.  Pulmonic Valve: Not well-visualized  Left Atrium: normal  Right Atrium: Not well-visualized  Left Ventricle: normal LV size and systolic function, estimated LVEF of 55%. Mild LVH. Endocardium is not well-visualized, cannot rule out segmental dysfunction  Right Ventricle: Grossly normal size and systolic function.  Pericardium/Pleura: normal, no significant pericardial effusion.  Pulmonary/RV Pressure: estimated PA systolic pressure of 15.7 mmHg assuming an RA pressure of 3 mmHg.  LV diastolic dysfunction is present    Conclusion:  Technically difficult and limited study  Mild concentric LVH with grossly normal left ventricular systolic function, estimated LVEF of 55%.  Grossly normal RV size and systolic function.  Aortic valve is not well-visualized, grossly normal function without severe pathology  Mild MR  Trace TR.  No significant pericardial effusion.    < end of copied text >    < from: Cardiac Cath Lab - Adult (03.24.17 @ 11:16) >  VENTRICLES: Global left ventricular function was normal. EF estimated was  65 %.  CORONARY VESSELS: The coronary circulation is right dominant.  LM:   --  Mid left main: There was a 30 % stenosis.  LAD:   --  Ostial LAD: There was a 40 % stenosis.  CX:   --  Circumflex: Normal.  RCA:   --  RCA: Normal.  COMPLICATIONS: There were no complications.  DIAGNOSTIC RECOMMENDATIONS: The patient should continue with the present  medications.  < end of copied text >

## 2020-10-20 NOTE — PROGRESS NOTE ADULT - ASSESSMENT
60F w/ end stage COPD on 3LNC (pending transplant list at [facility]), former smoker, CAD s/p stent (2016), Afib on Eliquis,  uncontrolled DM2 (on insulin), raynaud phenomenon and recent hospitalization at [facility] for confusion and [facility] p/w sob, admitted for COPD exacerbation and multifocal PNA    CAD s/p stent   - No clinical evidence of ACS  - Continue asa and statin  - EKG showed SR @ 109, RBBB that is chronic  - Continue Bumex to 1mg PO- 12  (home dose)    Paroxysmal Afib  -  Remains NSR on tele with no events. Can discontinue telemetry.   - Continue Eliquis 5mg  - Continue Cardizem CD at 240mg   - Monitor electrolytes, replete to keep K>4 and Mag>2  - TTE w/ mild concentric LVH grossly nL LVSF ef 55%, mild MR    End stage COPD/Pneumonia  - Pulm following.  Per note, patient is on waiting list for transplant  - CT chest noted  - Continue steroids and antibiotics per Pulm  - Continue NC and BIPAP/CPAP prn/nocturnally  - Continue to encourage incentive spirometer    VTE ppx  - On Eliquis    - Monitor and replete lytes, keep K>4, Mg>2.  - All other medical needs as per primary team.  - Other cardiovascular workup will depend on clinical course.  - Will continue to follow.    Lisa Soto MS FNP, [facility]  Nurse Practitioner- Cardiology   Spectra #[phone]

## 2020-10-20 NOTE — PROGRESS NOTE ADULT - PROBLEM SELECTOR PLAN 4
Resolved  - likely 2/2 to prerenal vs hypoxemic  - trend renal indices  SPEP with gamma migrating protein seen -- HemeOnc consulted to r/o Multiple myeloma, MGUS; f/u recs

## 2020-10-20 NOTE — PROGRESS NOTE ADULT - SUBJECTIVE AND OBJECTIVE BOX
[facility]  INFECTIOUS DISEASES   =======================================================    [facility]-[id number]  CHERI HARTMAN     Follow up: COPD exacerbation, Pneumonia    No new changes or overnight event.   No fever. using commode without any NIELSON but still has NIELSON in walking few steps.   Complaining of swelling in legs could be related to her position and steroid as well.     PAST MEDICAL & SURGICAL HISTORY:  H/O Raynaud&#x27;s syndrome  Ascorbic acid deficiency  Iron deficiency anemia  Constipation  GERD without esophagitis  Type 2 diabetes mellitus  HTN (hypertension)  Heart failure  HLD (hyperlipidemia)  History of chronic atrial fibrillation  on Eliquis  End stage COPD  on 3LNC  History of tonsillectomy    Social Hx: former heavy smoker, no ETOH or drugs     FAMILY HISTORY:  FH: myocardial infarction    Family history of CABG    Allergies  No Known Allergies    Antibiotics:  Azithromycin      REVIEW OF SYSTEMS:  CONSTITUTIONAL:  No Fever or chills  HEENT:  No diplopia or blurred vision.  No sore throat or runny nose.  CARDIOVASCULAR:  No chest pain or SOB.  RESPIRATORY:  +cough, severe SOB  GASTROINTESTINAL:  No nausea, vomiting or diarrhea.  GENITOURINARY:  No dysuria, frequency or urgency. No Blood in urine  MUSCULOSKELETAL:  no joint aches, no muscle pain  SKIN:  No change in skin, hair or nails.  NEUROLOGIC:  No paresthesias, fasciculations, seizures or weakness.  PSYCHIATRIC:  No disorder of thought or mood.  ENDOCRINE:  No heat or cold intolerance, polyuria or polydipsia.  HEMATOLOGICAL:  No easy bruising or bleeding.     Physical Exam:  Vital Signs Last 24 Hrs  T(C): 36.5 (20 Oct 2020 06:23), Max: 36.6 (19 Oct 2020 12:57)  T(F): 97.7 (20 Oct 2020 06:23), Max: 97.9 (19 Oct 2020 12:57)  HR: 78 (20 Oct 2020 06:23) (70 - 89)  BP: 147/81 (20 Oct 2020 06:23) (128/68 - 147/81)  BP(mean): --  RR: 19 (20 Oct 2020 06:23) (17 - 19)  SpO2: 99% (20 Oct 2020 06:23) (95% - 99%)  GEN: NAD  HEENT: normocephalic and atraumatic. EOMI. PERRL.    NECK: Supple.  No lymphadenopathy   LUNGS: very poor air movement but Clear to auscultation with no wheezing or rales .  HEART: Regular rate and rhythm   ABDOMEN: Soft, nontender, and nondistended.  Positive bowel sounds.    : No CVA tenderness  EXTREMITIES: Without any cyanosis, clubbing, rash, lesions or edema.  NEUROLOGIC: grossly intact.  PSYCHIATRIC: Appropriate affect .  SKIN: No ulceration or induration present.    Labs:                        8.9    11.95 )-----------( 290      ( 20 Oct 2020 08:26 )             30.3      10-20    140  |  97  |  34<H>  ----------------------------<  154<H>  4.1   |  41<H>  |  0.90    Ca    9.6      20 Oct 2020 08:26    TPro  6.5  /  Alb  2.6<L>  /  TBili  0.5  /  DBili  x   /  AST  14<L>  /  ALT  19  /  AlkPhos  63  10-20    Culture - Sputum (collected 10-12-20 @ 16:44)  Source: .Sputum Sputum  Gram Stain (10-12-20 @ 19:22):    No polymorphonuclear leukocytes per low power field    Rare Squamous epithelial cells per low power field    Few Gram Negative Rods per oil power field    Rare Gram positive cocci in pairs per oil power field  Final Report (10-19-20 @ 12:09):    Rare Aspergillus fumigatus    Normal Respiratory Richie present    Culture - Sputum (collected 10-09-20 @ 06:28)  Source: .Sputum Sputum  Gram Stain (10-09-20 @ 13:47):    No polymorphonuclear leukocytes per low power field    Few Squamous epithelial cells per low power field    Few Gram Positive Cocci in Clusters per oil power field  Final Report (10-11-20 @ 08:47):    Normal Respiratory Richie present    WBC Count: 11.95 K/uL (10-20-20 @ 08:26)  WBC Count: 13.77 K/uL (10-19-20 @ 09:26)  WBC Count: 12.45 K/uL (10-18-20 @ 09:51)  WBC Count: 15.00 K/uL (10-17-20 @ 12:11)  WBC Count: 9.28 K/uL (10-16-20 @ 06:02)    Creatinine, Serum: 0.90 mg/dL (10-20-20 @ 08:26)  Creatinine, Serum: 1.10 mg/dL (10-19-20 @ 09:26)  Creatinine, Serum: 1.00 mg/dL (10-18-20 @ 09:51)  Creatinine, Serum: 0.98 mg/dL (10-17-20 @ 12:11)  Creatinine, Serum: 1.00 mg/dL (10-16-20 @ 06:02)    Ferritin, Serum: 96 ng/mL (10-09-20 @ 12:19)    Procalcitonin, Serum: <0.05 ng/mL (10-16-20 @ 06:02)  Procalcitonin, Serum: 0.05 ng/mL (10-10-20 @ 06:48)     COVID-19 PCR: NotDetec (10-06-20 @ 03:23)    Imaging:  < from: Xray Chest 1 View-PORTABLE IMMEDIATE (Xray Chest 1 View-PORTABLE IMMEDIATE .) (10.10.20 @ 09:54) >  EXAM:  XR CHEST PORTABLE IMMED 1V                        PROCEDURE DATE:  10/10/2020    INTERPRETATION:  INDICATION: Pneumonia; follow-up assessment.  PRIORS: 10/8/2020.  VIEWS: Portable AP radiography of the chest performed.  FINDINGS: Heart size appears within normal limits. No hilar or superior mediastinal abnormalities are identified. Infiltrates within the bilateral mid to lower lung fields are without significant change. No pleural effusion or pneumothorax is demonstrated. No mediastinal shift is noted. The visualized osseous structures appear unremarkable.  IMPRESSION: No significant interval change.    Assessment and Plan:   59y/o woman with end stage COPD on 3L O2 at home awaiting transplant [facility], former smoker, CAD s/p stent, afib, DM2), raynaud phenomenon and recent hospitalization at [facility] for confusion and AUR[facility]A has been sent from [facility] with worsening of SOB. CT with multilobar opacities. Due to recent hospitalization and rehab stay, will cover for possible HCAP. Very high risk with a poor lung capacities.   10/16 s/p 10days of ceftriaxone and now on azithromycin daily, sputum showed rate mold, most likely not an invasive fungal infection, ABPA is a possibility, Eos% not high.    Legionella U ag negative   TTE, result noted, EF=55%,  CXR and CT with multilobar opacities  Rare aspergillus in sputum   Normal PCT x 2     COPD, Pneumonia  - Blood culture NGTD  - First Sputum culture with normal respiratory richie and 2nd one with rare aspergillus   - Galactomannan in low, highly unlikely with invasive aspergillosis with low Gal and procal  - Pulmonary consult noted, possibly mold in sputum is colonization   - Azithromycin given by pulmonary for antiinflammatory effect and prophylaxis.   - No other antibiotic and antifungal recommended.     Will sign off please call with any question.     Wagner Juarez MD  Division of Infectious Diseases   Cell [phone] between 7am and 5pm   After 5pm and weekends please call ID service at [phone].

## 2020-10-20 NOTE — PROGRESS NOTE ADULT - ATTENDING COMMENTS
I personally conducted a physical examination of the patient. I personally gathered the patient's history. I edited the above listed findings which were prepared by the listed resident physician. I personally discussed the plan of care with the patient. The questions and concerns were addressed to the best of my ability. The patient is in agreement with the listed treatment plan.    Patient seen and examined at bedside. Patient states she feels her breathing is a bit worse today. Aspergillus antigen negative. Patient worried about her swelling in LE (looks slightly improved today). Continue bumex 1mg PO BID. Waiting for set up of trilogy machine at home.

## 2020-10-20 NOTE — PROGRESS NOTE ADULT - SUBJECTIVE AND OBJECTIVE BOX
Patient is a 60y old  Female who presents with a chief complaint of COPD exacerbation, PNA (08 Oct 2020 11:37)       HPI:  60F w/ end stage COPD on 3LNC (pending transplant list at [facility]), former smoker, CAD s/p stent (2016), afib on eliquis, uncontrolled DM2 (on insulin), raynaud phenomenon and recent hospitalization at [facility] for confusion and [facility] p[facility]w sob[facility] Patient states that she has had worsening shortness of breath for past two days. Unable to obtain comprehensive history due to dyspnea. Patient denies fever, chills, sore throat, cough, chest pain, palpitations, abd pain, n/v. Currently feels short of breath even after receiving duonebs and steroids in the ED. Unable to keep mask on during interview and lay back in stretcher due to subjective sob. Patient repeatedly stating that it is too hot in her room and fanning herself with a paper. Per chart, Dr. Mathew (PCP) has chart notes regarding possible hallucinations. Would like her home medications now but otherwise has no acute complaints.  Has not seen nephrologist.  Denies any N/V.  SOB improving.  States she is urinating well.  Denies any urinary retention.  Denies any supplement or NSAID usage.         Still c/o edema, but improved    PAST MEDICAL & SURGICAL HISTORY:  H/O Raynaud&#x27;s syndrome    Ascorbic acid deficiency    Iron deficiency anemia    Constipation    GERD without esophagitis    Type 2 diabetes mellitus    HTN (hypertension)    Heart failure    HLD (hyperlipidemia)    History of chronic atrial fibrillation  on Eliquis    End stage COPD  on 3LNC    Atrial fibrillation    Hyperlipemia    Chronic sinusitis    Raynaud phenomenon    HTN (hypertension)    DM (diabetes mellitus)    Claustrophobia    COPD (chronic obstructive pulmonary disease)    History of tonsillectomy    S/P tonsillectomy         FAMILY HISTORY:  FH: myocardial infarction    Family history of CABG    FH: MI (myocardial infarction)    FH: CABG (coronary artery bypass surgery)    NC    Social History:Non smoker    MEDICATIONS  (STANDING):  apixaban 5 milliGRAM(s) Oral every 12 hours  ascorbic acid 500 milliGRAM(s) Oral daily  aspirin  chewable 81 milliGRAM(s) Oral daily  atorvastatin 80 milliGRAM(s) Oral at bedtime  azithromycin  IVPB 500 milliGRAM(s) IV Intermittent every 24 hours  budesonide 160 MICROgram(s)/formoterol 4.5 MICROgram(s) Inhaler 2 Puff(s) Inhalation two times a day  cefepime   IVPB 2000 milliGRAM(s) IV Intermittent every 12 hours  cholecalciferol 1000 Unit(s) Oral daily  dextrose 5%. 1000 milliLiter(s) (50 mL/Hr) IV Continuous <Continuous>  dextrose 50% Injectable 12.5 Gram(s) IV Push once  dextrose 50% Injectable 25 Gram(s) IV Push once  dextrose 50% Injectable 25 Gram(s) IV Push once  ferrous    sulfate 325 milliGRAM(s) Oral daily  insulin glargine Injectable (LANTUS) 10 Unit(s) SubCutaneous at bedtime  insulin lispro (HumaLOG) corrective regimen sliding scale   SubCutaneous three times a day before meals  insulin lispro Injectable (HumaLOG) 3 Unit(s) SubCutaneous three times a day before meals  montelukast 10 milliGRAM(s) Oral at bedtime  multivitamin 1 Tablet(s) Oral daily  pantoprazole    Tablet 40 milliGRAM(s) Oral before breakfast  polyethylene glycol 3350 17 Gram(s) Oral daily  potassium chloride    Tablet ER 40 milliEquivalent(s) Oral every 4 hours  senna 2 Tablet(s) Oral at bedtime  tiotropium 18 MICROgram(s) Capsule 1 Capsule(s) Inhalation daily    MEDICATIONS  (PRN):  acetaminophen   Tablet .. 650 milliGRAM(s) Oral every 6 hours PRN Mild Pain (1 - 3)  bisacodyl Suppository 10 milliGRAM(s) Rectal daily PRN Constipation  dextrose 40% Gel 15 Gram(s) Oral once PRN Blood Glucose LESS THAN 70 milliGRAM(s)/deciliter  glucagon  Injectable 1 milliGRAM(s) IntraMuscular once PRN Glucose LESS THAN 70 milligrams/deciliter  guaiFENesin   Syrup  (Sugar-Free) 100 milliGRAM(s) Oral every 6 hours PRN Cough  lactulose Syrup 15 Gram(s) Oral two times a day PRN constipation  levalbuterol Inhalation 0.63 milliGRAM(s) Inhalation every 4 hours PRN shortness of breath and/or wheezing  oxyCODONE    IR 5 milliGRAM(s) Oral every 6 hours PRN Moderate Pain (4 - 6)   Meds reviewed    Allergies    No Known Allergies    Intolerances    albuterol (Unknown)       REVIEW OF SYSTEMS:    CONSTITUTIONAL:  No weight loss   EYES: No eye pain, visual disturbances, or discharge  ENMT:  No difficulty hearing, tinnitus, vertigo; No sinus or throat pain  NECK: No pain or stiffness  BREASTS: No pain, masses, or nipple discharge  RESPIRATORY: +SOB. +NIELSON  CARDIOVASCULAR: No chest pain, palpitations, dizziness,   GASTROINTESTINAL: No abdominal or epigastric pain. No nausea, vomiting, or hematemesis;  GENITOURINARY: No dysuria, frequency, hematuria, or incontinence  NEUROLOGICAL: No headaches, memory loss, loss of strength, numbness, or tremors  SKIN: no Diffuse erythema, no blisters  LYMPH NODES: No enlarged glands  ENDOCRINE: No heat or cold intolerance; No hair loss  MUSCULOSKELETAL: No joint pain or swelling   PSYCHIATRIC: No depression, anxiety, mood swings, or difficulty sleeping  ALLERGY AND IMMUNOLOGIC: No hives or eczema    ICU Vital Signs Last 24 Hrs  T(C): 36.6 (19 Oct 2020 12:57), Max: 36.8 (18 Oct 2020 21:24)  T(F): 97.9 (19 Oct 2020 12:57), Max: 98.3 (18 Oct 2020 21:24)  HR: 70 (19 Oct 2020 12:57) (70 - 101)  BP: 128/68 (19 Oct 2020 12:57) (126/68 - 135/82)  BP(mean): --  ABP: --  ABP(mean): --  RR: 17 (19 Oct 2020 12:57) (17 - 20)  SpO2: 95% (19 Oct 2020 12:57) (93% - 98%)                PHYSICAL EXAM:    GENERAL: No distress  HEAD:  Atraumatic, Normocephalic  EYES: EOMI, conjunctiva and sclera clear  ENMT: No drainage from nares, no drainage from pinna  NECK: Supple, neck  veins full  NERVOUS SYSTEM:  Alert & Oriented X3, Good concentration; Motor Strength wnl upper and lower extremities  CHEST/LUNG: Decreased BS,no rales, no rhonchi, Mild wheezing  HEART: Regular rate and rhythm; No murmurs, rubs, or gallops  ABDOMEN: Soft, Nontender, Nondistended; Bowel sounds present,  EXTREMITIES: trace Edema  SKIN: No rashes or lesions, pale      LABS:                                              8.9    11.95 )-----------( 290      ( 20 Oct 2020 08:26 )             30.3     10-20    140  |  97  |  34<H>  ----------------------------<  154<H>  4.1   |  41<H>  |  0.90    Ca    9.6      20 Oct 2020 08:26    TPro  6.5  /  Alb  2.6<L>  /  TBili  0.5  /  DBili  x   /  AST  14<L>  /  ALT  19  /  AlkPhos  63  10-20

## 2020-10-20 NOTE — PROGRESS NOTE ADULT - ASSESSMENT
AURORA on CKD 2  Hyponatremia  Hypokalemia  COPD  Hypercalcemia  Metabolic Alkalosis     -BLCr 1  -AURORA unclear etiology, clinically ATN  -UA - 3-5 WBC, trace ketones, 30 proteins  -FeUrea 34.6%  -UPC 0.44  -Ur osm 454  -Corrected calcium 10.2, with paraprotein gap and anemia; FLC ratio is normal; SPEP with gamma migrating protein seen  -Iron repletion   -Metformin on hold  -Renal indices are stable at baseline; Bumex increased and changed to BID by cardio, tolerating thus far  -Less likely AIN given paucity of WBC on UA and no peripheral eosinophils  -Strict I&Os  -Pulm follow up noted  -Heme/onc note reviewed.  Possible MGUS, repeat studies as outpatient  -Alkalosis likely 2/2 compensation for respiratory acidosis

## 2020-10-20 NOTE — PROGRESS NOTE ADULT - SUBJECTIVE AND OBJECTIVE BOX
PULMONARY/CRITICAL CARE      INTERVAL HPI/OVERNIGHT EVENTS:  Unchanged. Still sob.  Still some dark sputum.   Tried ambulating with walker.  60y FemaleHPI:  60F w/ end stage COPD on 3LNC (pending transplant list at [facility]), former smoker, CAD s/p stent (2016), afib on eliquis, uncontrolled DM2 (on insulin), raynaud phenomenon and recent hospitalization at [facility] for confusion and [facility] Patient states that she has had worsening shortness of breath for past two days. Unable to obtain comprehensive history due to dyspnea. Patient denies fever, chills, sore throat, cough, chest pain, palpitations, abd pain, n/v. Currently feels short of breath even after receiving duonebs and steroids in the ED. Unable to keep mask on during interview and lay back in stretcher due to subjective sob. Patient repeatedly stating that it is too hot in her room and fanning herself with a paper. Per chart, Dr. Mathew (PCP) has chart notes regarding possible hallucinations. Would like her home medications now but otherwise has no acute complaints.    In the ED, VS T97.7F  /78 RR 21 SpO2 94% on 4LNC. Labs significant for mild leukocytosis of 11.41, H/H 9.9/30.5, thrombophilia of 435. CO2 35, albumin 2.4.   CXR done showing b/l patchy infiltrates, official read pending  CT chest done showing multifocal PNA and reactive mediastinal lymphadenopathy  EKG read limited by artifact, sinus tachycardia with RBBB and premature supraventricular complexes (06 Oct 2020 04:55)        PAST MEDICAL & SURGICAL HISTORY:  Ascorbic acid deficiency    Iron deficiency anemia    Constipation    GERD without esophagitis    Type 2 diabetes mellitus    HTN (hypertension)    Heart failure    End stage COPD  on 3LNC    Atrial fibrillation    Hyperlipemia    Chronic sinusitis    Raynaud phenomenon    Claustrophobia    COPD (chronic obstructive pulmonary disease)    S/P tonsillectomy          ICU Vital Signs Last 24 Hrs  T(C): 36.7 (12 Oct 2020 05:11), Max: 36.8 (11 Oct 2020 13:06)  T(F): 98 (12 Oct 2020 05:11), Max: 98.3 (11 Oct 2020 13:06)  HR: 70 (12 Oct 2020 08:09) (70 - 90)  BP: 138/78 (12 Oct 2020 05:11) (121/73 - 138/78)  BP(mean): --  ABP: --  ABP(mean): --  RR: 17 (12 Oct 2020 05:11) (17 - 22)  SpO2: 99% (12 Oct 2020 08:09) (94% - 100%)    Qtts:     I&O's Summary    11 Oct 2020 07:01  -  12 Oct 2020 07:00  --------------------------------------------------------  IN: 50 mL / OUT: 800 mL / NET: -750 mL              PHYSICAL EXAM:    GENERAL: NAD, well-groomed, well-developed, NAD  HEAD:  Atraumatic, Normocephalic  EYES: EOMI, PERRLA, conjunctiva and sclera clear  ENMT: No tonsillar erythema, exudates, or enlargement; Moist mucous membranes, Good dentition, No lesions  NECK: Supple, No JVD, Normal thyroid  NERVOUS SYSTEM:  Alert & Oriented X3, Good concentration; Motor Strength 5/5 B/L upper and lower extremities  CHEST/LUNG: Clear to percussion bilaterally; No rales, rhonchi, wheezing, or rubs Decreased bs  HEART: Regular rate and rhythm; No murmurs, rubs, or gallops  ABDOMEN: Soft, Nontender, Nondistended; Bowel sounds present  EXTREMITIES:  2+ Peripheral Pulses, No clubbing, cyanosis, or edema  LYMPH: No lymphadenopathy noted  SKIN: No rashes or lesions        LABS:                        8.9    10.88 )-----------( 483      ( 11 Oct 2020 07:04 )             30.4     10-11    139  |  99  |  36<H>  ----------------------------<  317<H>  4.4   |  34<H>  |  0.94    Ca    9.2      11 Oct 2020 07:04    TPro  6.3  /  Alb  2.4<L>  /  TBili  0.2  /  DBili  x   /  AST  12<L>  /  ALT  16  /  AlkPhos  70  10-11          vanco through     RADIOLOGY & ADDITIONAL STUDIES:      CRITICAL CARE TIME SPENT:

## 2020-10-21 ENCOUNTER — TRANSCRIPTION ENCOUNTER (OUTPATIENT)
Age: 60
End: 2020-10-21

## 2020-10-21 LAB
ALBUMIN SERPL ELPH-MCNC: 2.5 G/DL — LOW (ref 3.3–5)
ALP SERPL-CCNC: 61 U/L — SIGNIFICANT CHANGE UP (ref 40–120)
ALT FLD-CCNC: 21 U/L — SIGNIFICANT CHANGE UP (ref 12–78)
ANION GAP SERPL CALC-SCNC: 2 MMOL/L — LOW (ref 5–17)
AST SERPL-CCNC: 14 U/L — LOW (ref 15–37)
BASOPHILS # BLD AUTO: 0.03 K/UL — SIGNIFICANT CHANGE UP (ref 0–0.2)
BASOPHILS NFR BLD AUTO: 0.3 % — SIGNIFICANT CHANGE UP (ref 0–2)
BILIRUB SERPL-MCNC: 0.3 MG/DL — SIGNIFICANT CHANGE UP (ref 0.2–1.2)
BUN SERPL-MCNC: 36 MG/DL — HIGH (ref 7–23)
CALCIUM SERPL-MCNC: 9.7 MG/DL — SIGNIFICANT CHANGE UP (ref 8.5–10.1)
CHLORIDE SERPL-SCNC: 97 MMOL/L — SIGNIFICANT CHANGE UP (ref 96–108)
CO2 SERPL-SCNC: 42 MMOL/L — HIGH (ref 22–31)
CREAT SERPL-MCNC: 0.98 MG/DL — SIGNIFICANT CHANGE UP (ref 0.5–1.3)
EOSINOPHIL # BLD AUTO: 0.23 K/UL — SIGNIFICANT CHANGE UP (ref 0–0.5)
EOSINOPHIL NFR BLD AUTO: 2.4 % — SIGNIFICANT CHANGE UP (ref 0–6)
GLUCOSE SERPL-MCNC: 145 MG/DL — HIGH (ref 70–99)
HCT VFR BLD CALC: 31.9 % — LOW (ref 34.5–45)
HGB BLD-MCNC: 9.6 G/DL — LOW (ref 11.5–15.5)
IMM GRANULOCYTES NFR BLD AUTO: 1.5 % — SIGNIFICANT CHANGE UP (ref 0–1.5)
LYMPHOCYTES # BLD AUTO: 1.41 K/UL — SIGNIFICANT CHANGE UP (ref 1–3.3)
LYMPHOCYTES # BLD AUTO: 14.6 % — SIGNIFICANT CHANGE UP (ref 13–44)
MCHC RBC-ENTMCNC: 24.7 PG — LOW (ref 27–34)
MCHC RBC-ENTMCNC: 30.1 GM/DL — LOW (ref 32–36)
MCV RBC AUTO: 82 FL — SIGNIFICANT CHANGE UP (ref 80–100)
MONOCYTES # BLD AUTO: 0.95 K/UL — HIGH (ref 0–0.9)
MONOCYTES NFR BLD AUTO: 9.8 % — SIGNIFICANT CHANGE UP (ref 2–14)
NEUTROPHILS # BLD AUTO: 6.91 K/UL — SIGNIFICANT CHANGE UP (ref 1.8–7.4)
NEUTROPHILS NFR BLD AUTO: 71.4 % — SIGNIFICANT CHANGE UP (ref 43–77)
NRBC # BLD: 0 /100 WBCS — SIGNIFICANT CHANGE UP (ref 0–0)
PLATELET # BLD AUTO: 269 K/UL — SIGNIFICANT CHANGE UP (ref 150–400)
POTASSIUM SERPL-MCNC: 3.9 MMOL/L — SIGNIFICANT CHANGE UP (ref 3.5–5.3)
POTASSIUM SERPL-SCNC: 3.9 MMOL/L — SIGNIFICANT CHANGE UP (ref 3.5–5.3)
PROT SERPL-MCNC: 6 G/DL — SIGNIFICANT CHANGE UP (ref 6–8.3)
RBC # BLD: 3.89 M/UL — SIGNIFICANT CHANGE UP (ref 3.8–5.2)
RBC # FLD: 18.2 % — HIGH (ref 10.3–14.5)
SODIUM SERPL-SCNC: 141 MMOL/L — SIGNIFICANT CHANGE UP (ref 135–145)
WBC # BLD: 9.68 K/UL — SIGNIFICANT CHANGE UP (ref 3.8–10.5)
WBC # FLD AUTO: 9.68 K/UL — SIGNIFICANT CHANGE UP (ref 3.8–10.5)

## 2020-10-21 PROCEDURE — 99232 SBSQ HOSP IP/OBS MODERATE 35: CPT

## 2020-10-21 PROCEDURE — 99233 SBSQ HOSP IP/OBS HIGH 50: CPT

## 2020-10-21 PROCEDURE — 99497 ADVNCD CARE PLAN 30 MIN: CPT | Mod: 25

## 2020-10-21 PROCEDURE — 99233 SBSQ HOSP IP/OBS HIGH 50: CPT | Mod: GC

## 2020-10-21 RX ADMIN — Medication 2: at 17:54

## 2020-10-21 RX ADMIN — Medication 240 MILLIGRAM(S): at 06:36

## 2020-10-21 RX ADMIN — Medication 4 UNIT(S): at 17:57

## 2020-10-21 RX ADMIN — Medication 500 MICROGRAM(S): at 19:58

## 2020-10-21 RX ADMIN — Medication 81 MILLIGRAM(S): at 12:47

## 2020-10-21 RX ADMIN — AZITHROMYCIN 500 MILLIGRAM(S): 500 TABLET, FILM COATED ORAL at 12:47

## 2020-10-21 RX ADMIN — Medication 4 UNIT(S): at 12:49

## 2020-10-21 RX ADMIN — Medication 4 UNIT(S): at 08:46

## 2020-10-21 RX ADMIN — APIXABAN 5 MILLIGRAM(S): 2.5 TABLET, FILM COATED ORAL at 17:57

## 2020-10-21 RX ADMIN — MONTELUKAST 10 MILLIGRAM(S): 4 TABLET, CHEWABLE ORAL at 21:43

## 2020-10-21 RX ADMIN — Medication 5 MILLILITER(S): at 17:58

## 2020-10-21 RX ADMIN — BUDESONIDE AND FORMOTEROL FUMARATE DIHYDRATE 2 PUFF(S): 160; 4.5 AEROSOL RESPIRATORY (INHALATION) at 21:44

## 2020-10-21 RX ADMIN — POLYETHYLENE GLYCOL 3350 17 GRAM(S): 17 POWDER, FOR SOLUTION ORAL at 12:47

## 2020-10-21 RX ADMIN — Medication 500 MICROGRAM(S): at 12:53

## 2020-10-21 RX ADMIN — Medication 600 MILLIGRAM(S): at 06:36

## 2020-10-21 RX ADMIN — Medication 1000 UNIT(S): at 12:47

## 2020-10-21 RX ADMIN — APIXABAN 5 MILLIGRAM(S): 2.5 TABLET, FILM COATED ORAL at 06:36

## 2020-10-21 RX ADMIN — BUMETANIDE 1 MILLIGRAM(S): 0.25 INJECTION INTRAMUSCULAR; INTRAVENOUS at 06:36

## 2020-10-21 RX ADMIN — Medication 500 MICROGRAM(S): at 01:09

## 2020-10-21 RX ADMIN — INSULIN GLARGINE 12 UNIT(S): 100 INJECTION, SOLUTION SUBCUTANEOUS at 21:45

## 2020-10-21 RX ADMIN — Medication 600 MILLIGRAM(S): at 17:52

## 2020-10-21 RX ADMIN — ATORVASTATIN CALCIUM 80 MILLIGRAM(S): 80 TABLET, FILM COATED ORAL at 21:43

## 2020-10-21 RX ADMIN — Medication 325 MILLIGRAM(S): at 12:47

## 2020-10-21 RX ADMIN — Medication 20 MILLIGRAM(S): at 06:36

## 2020-10-21 RX ADMIN — Medication 1 TABLET(S): at 12:46

## 2020-10-21 RX ADMIN — BUMETANIDE 1 MILLIGRAM(S): 0.25 INJECTION INTRAMUSCULAR; INTRAVENOUS at 17:52

## 2020-10-21 RX ADMIN — Medication 500 MICROGRAM(S): at 08:15

## 2020-10-21 RX ADMIN — PANTOPRAZOLE SODIUM 40 MILLIGRAM(S): 20 TABLET, DELAYED RELEASE ORAL at 06:36

## 2020-10-21 RX ADMIN — BUDESONIDE AND FORMOTEROL FUMARATE DIHYDRATE 2 PUFF(S): 160; 4.5 AEROSOL RESPIRATORY (INHALATION) at 06:36

## 2020-10-21 RX ADMIN — SENNA PLUS 2 TABLET(S): 8.6 TABLET ORAL at 21:43

## 2020-10-21 RX ADMIN — Medication 4: at 12:49

## 2020-10-21 NOTE — PROGRESS NOTE ADULT - ATTENDING COMMENTS
60F w/ end stage COPD on 3LNC (trying to get on  transplant list at [facility]), former smoker, CAD s/p stent (2016), afib on eliquis, DM2 (on insulin), raynaud phenomenon and recent hospitalization at [facility] for confusion and AURORA p/w sob, admitted for acute on chronic resp failure with hypoxia and hyercarbia sec to multifocal PNA and COPD exacerabtion with end stage COPD. Plan: complete iv anitbx, apprec pulm recs, awaiting trilagy machine, dc planning underway, apprec palliative collaboraiton

## 2020-10-21 NOTE — PROGRESS NOTE ADULT - CONVERSATION/DISCUSSION
Diagnosis/MOLST Discussed/Prognosis/Treatment Options
Treatment Options/Prognosis/Diagnosis
Diagnosis/MOLST Discussed/Prognosis/Treatment Options

## 2020-10-21 NOTE — PROGRESS NOTE ADULT - PROBLEM SELECTOR PLAN 3
recent TTE in 08/2020 showing normal LVEF, stage 1 diastolic dysfunction  - TTE (10/6/20) LVEF of 55%   - Patient w/ continued LE edema, mild improvement  - Continue home dose Bumex (1mg PO BID)  - caution with excessive IVF

## 2020-10-21 NOTE — PROGRESS NOTE ADULT - SUBJECTIVE AND OBJECTIVE BOX
SUBJECTIVE AND OBJECTIVE/INTERVAL HPI/OVERNIGHT EVENTS:  - no acute events overnight  - plan for d/c home tomorrow    DNR on chart: Yes      Allergies    No Known Allergies    Intolerances    albuterol (Unknown)  MEDICATIONS  (STANDING):  apixaban 5 milliGRAM(s) Oral every 12 hours  aspirin  chewable 81 milliGRAM(s) Oral daily  atorvastatin 80 milliGRAM(s) Oral at bedtime  azithromycin   Tablet 500 milliGRAM(s) Oral daily  Biotene Dry Mouth Oral Rinse 5 milliLiter(s) Swish and Spit two times a day  budesonide 160 MICROgram(s)/formoterol 4.5 MICROgram(s) Inhaler 2 Puff(s) Inhalation two times a day  buMETAnide 1 milliGRAM(s) Oral two times a day  cholecalciferol 1000 Unit(s) Oral daily  dextrose 5%. 1000 milliLiter(s) (50 mL/Hr) IV Continuous <Continuous>  dextrose 50% Injectable 12.5 Gram(s) IV Push once  dextrose 50% Injectable 25 Gram(s) IV Push once  dextrose 50% Injectable 25 Gram(s) IV Push once  diltiazem    milliGRAM(s) Oral daily  ferrous    sulfate 325 milliGRAM(s) Oral daily  guaiFENesin  milliGRAM(s) Oral every 12 hours  insulin glargine Injectable (LANTUS) 12 Unit(s) SubCutaneous at bedtime  insulin lispro (ADMELOG) corrective regimen sliding scale   SubCutaneous at bedtime  insulin lispro (ADMELOG) corrective regimen sliding scale.   SubCutaneous three times a day before meals  insulin lispro Injectable (ADMELOG) 4 Unit(s) SubCutaneous three times a day before meals  ipratropium    for Nebulization 500 MICROGram(s) Nebulizer every 6 hours  montelukast 10 milliGRAM(s) Oral at bedtime  multivitamin 1 Tablet(s) Oral daily  pantoprazole    Tablet 40 milliGRAM(s) Oral before breakfast  polyethylene glycol 3350 17 Gram(s) Oral daily  predniSONE   Tablet 20 milliGRAM(s) Oral daily  senna 2 Tablet(s) Oral at bedtime  tiotropium 18 MICROgram(s) Capsule 1 Capsule(s) Inhalation daily    MEDICATIONS  (PRN):  acetaminophen   Tablet .. 650 milliGRAM(s) Oral every 6 hours PRN Mild Pain (1 - 3)  ALPRAZolam 0.25 milliGRAM(s) Oral every 8 hours PRN anxiety  bisacodyl Suppository 10 milliGRAM(s) Rectal daily PRN Constipation  dextrose 40% Gel 15 Gram(s) Oral once PRN Blood Glucose LESS THAN 70 milliGRAM(s)/deciliter  glucagon  Injectable 1 milliGRAM(s) IntraMuscular once PRN Glucose LESS THAN 70 milligrams/deciliter  lactulose Syrup 15 Gram(s) Oral two times a day PRN constipation      ITEMS UNCHECKED ARE NOT PRESENT    PRESENT SYMPTOMS: [ ]Unable to obtain due to poor mentation   Source if other than patient:  [ ]Family   [ ]Team     Pain:  [ ]yes [ x]no  QOL impact -   Location -                    Aggravating factors -  Quality -  Radiation -  Timing-  Severity (0-10 scale):  Minimal acceptable level (0-10 scale):     Dyspnea:                           [ ]Mild [ ]Moderate [ x]Severe  Anxiety:                             [ ]Mild [ ]Moderate [x ]Severe  Fatigue:                             [ ]Mild [ ]Moderate [ ]Severe  Nausea:                             [ ]Mild [ ]Moderate [ ]Severe  Loss of appetite:              [ ]Mild [ ]Moderate [ ]Severe  Constipation:                    [ ]Mild [ ]Moderate [ ]Severe    CPOT:    https://www.[facility].org/getattachment/[email or web address]-[email or web address]-[email or web address]/Critical-Care-Pain-Observation-Tool-(CPOT)    PAIN AD Score:	  http://geriatrictoolkit.[facility]/cog/painad.pdf (Ctrl + left click to view)    Other Symptoms:  [ x]All other review of systems negative     Palliative Performance Status Version 2:        40 %      http://npcrc.org/files/news/palliative_performance_scale_ppsv2.pdf  PHYSICAL EXAM:  Vital Signs Last 24 Hrs  T(C): 37.1 (21 Oct 2020 13:13), Max: 37.1 (21 Oct 2020 13:13)  T(F): 98.7 (21 Oct 2020 13:13), Max: 98.7 (21 Oct 2020 13:13)  HR: 70 (21 Oct 2020 19:59) (66 - 78)  BP: 145/71 (21 Oct 2020 13:13) (133/75 - 150/85)  BP(mean): --  RR: 19 (21 Oct 2020 13:13) (19 - 19)  SpO2: 95% (21 Oct 2020 19:59) (94% - 100%) I&O's Summary    20 Oct 2020 07:01  -  21 Oct 2020 07:00  --------------------------------------------------------  IN: 0 mL / OUT: 2200 mL / NET: -2200 mL    21 Oct 2020 07:01  -  21 Oct 2020 20:48  --------------------------------------------------------  IN: 0 mL / OUT: 1450 mL / NET: -1450 mL      GENERAL: NAD, laying in bed comfortably, on NC  HEENT:  anicteric, moist mucous membranes  CHEST/LUNG:  Lungs w/ decreased BS in all fields. No wheezes or rhonchi  HEART:  RRR, S1, S2  ABDOMEN:  BS+, soft, nontender, nondistended  EXTREMITIES: no cyanosis, or calf tenderness. Lower legs wrapped in ACE bandage. 1+ pitting edema of RLE up to knee improved from yesterday. LLE w/ trace edema  NERVOUS SYSTEM: answers questions and follows commands appropriately  Psych: anxious    LABS:                        9.6    9.68  )-----------( 269      ( 21 Oct 2020 08:53 )             31.9   10-21    141  |  97  |  36<H>  ----------------------------<  145<H>  3.9   |  42<H>  |  0.98    Ca    9.7      21 Oct 2020 08:53    TPro  6.0  /  Alb  2.5<L>  /  TBili  0.3  /  DBili  x   /  AST  14<L>  /  ALT  21  /  AlkPhos  61  10-21        RADIOLOGY & ADDITIONAL STUDIES: reviewed    Protein Calorie Malnutrition Present: [ ]mild [ ]moderate [ ]severe [ ]underweight [ ]morbid obesity  https://www.andeal.org/vault/0651/web/files/ONC/Table_Clinical%20Characteristics%20to%20Document%20Malnutrition-White%20JV%20et%20al%202012.pdf    Height (cm): 162.6 (10-06-20 @ 01:24), 162.6 (08-01-20 @ 23:15), 162.56 (12-12-19 @ 08:06)  Weight (kg): 76.2 (10-06-20 @ 01:24), 90.3 (08-01-20 @ 23:15), 84.6 (12-12-19 @ 08:06)  BMI (kg/m2): 28.8 (10-06-20 @ 01:24), 34.2 (08-01-20 @ 23:15), 32 (12-12-19 @ 08:06)    [ ]PPSV2 < or = 30%  [ ]significant weight loss [ ]poor nutritional intake [ ]anasarca   Albumin, Serum: 2.5 g/dL (10-21-20 @ 08:53)   [ ]Artificial Nutrition    REFERRALS:   [ ]Chaplaincy  [ ]Hospice  [ ]Child Life  [ ]Social Work  [ ]Case management [ ]Holistic Therapy     Goals of Care Document:  ALVINA Gonzales (10-07-20 @ 16:36)  Goals of Care Conversation:   Participants:  · Participants  Patient    Advance Directives:  · Does patient have Advance Directive  No  · Caregiver:  yes  · Name  Michele Vega  · Phone Number  [phone]    Conversation Discussion:  · Conversation  AD  · Conversation Details  met pt, after speaking to Wang Hanson CM: pt uses O2 at home, goal to be on lung transplant list, wants HC, not [facility], pt has 2 brothers, transport pt to MD offices.  pt with SOB, on O2, pt approached regarding HCP, educated on role of hcp, pt declines to complete hcp or take a copy to think about at home.  pt unsure who will be her hcp, she has 2 brothers, Michele is younger brother.  pt spoke of coming from a long line of ill family, including her mother, she knows all about a hcp and the decisions that need to be made, but declines to discuss further, PC RN contact # given to pt, will follow as needed.    Personal Advance Directives Treatment Guidelines:   Treatment Guidelines:  · Decision Maker  Patient    Location of Discussion:   Duration of Advanced Care Planning Meeting:  · Time spent (in minutes)  25    Location of Discussion:  · Location of discussion  Face to face      Electronic Signatures:  Clementina Gonzales (RN)   (Signed 07-Oct-2020 16:48)  	Authored: Goals of Care Conversation, Personal Advance Directives Treatment Guidelines, Location of Discussion    Last Updated: 07-Oct-2020 20:52 by Suad Pinto)

## 2020-10-21 NOTE — PROGRESS NOTE ADULT - CONVERSATION DETAILS
Pt struggling emotionally with worsening SOB and expressed fear regarding death. Kept repeating "I'm a fighter. I don't want to disappoint my mother if I give in. I've seen a 20 year old receive lung transplant and live for 20 years and run a marathon. I want the same." Pt very anxious today which in turn was worsening her SOB even more. Writer slowly redirected patient and provided anticipatory management regarding end stage COPD. Pt once again stated that she does not want intubation on mechanical ventilator. Ok to continue BIPAP. Would like to continue pursue lung transplant however started to feel that likelihood of making it on the transplant list might not be realistic but she stated that she's not ready to accept that thought yet. She knows that since she hasn't been back to home since 8/1/20 is a bad sign. Pt is going to anger, denial (improving), bargaining, and depression stages of grief. Psychosocial support provided.
Writer met with pt and her brother/HCP Michele at bedside. Pt was exhibiting severe signs of anger with kerri and has been yelling at staff for a few days and telling them to leave. Declined d/c to home or [facility] or SNF. Now pt is upset that her family has hired an aid for when she returns home. Pt is not happy to be going home and wouldn't explain why. She also stated that she would like to live by herself. Pt is mostly bedbound and was unrealistic about her independence. Writer provided supportive counseling.  Pt initially wanted to pursue lung transplant (pt is NOT on a transplant list) however understands that likelihood is likely non-existing. Brother however told her to stay optimistic. Pt stated that she would like to avoid rehospitalization and would like to reduce suffering. Writer supported pt to express her wishes regarding her GOC further with her brothers/HCPs. When writer asked if we should adjust MOLST to include no rehospitalization, pt said, "not yet".
Pt appointed her brothers Michele & Alfonso as HCP and consented to DNR/DNI. Late, writer met with pt's brothers and sister in law Odilia. Reviewed patient's medical and social history as well as events leading to patient's hospitalization. Writer discussed patient's current diagnosis (resp failure, COPD, r/o asperigellosis & multiple myeloma), .... medical condition and management,  poor prognosis, and life expectancy. Informed them about pt's wishes. All in agreement. All questions answered. MOLST & HCP forms filled out and placed in chart. All were grieving appropriately. Psychosocial support provided.

## 2020-10-21 NOTE — PROGRESS NOTE ADULT - ASSESSMENT
Pt. with endstage COPD on transplant list, with acute pneumonia.  Pt wants to go home today. To have home PT  Agree with need for nocturnal NIV/Trilogy.  Improved clinically.  Fungus in sputum colonization.      FU Dr Mathew as outpt.   Can taper steroids.     Trial atrovent hhn which helped.   Inhalers.  PT.

## 2020-10-21 NOTE — PROGRESS NOTE ADULT - PROBLEM SELECTOR PLAN 1
likely multifactorial from multifocal PNA in setting of end stage COPD   - slow clinical improvement  - currently on 5L NC with BIPAP qhs -- maintain O2 >/ 88  - Continue Bumex 1mg BID  - Blood culture negative, initial Sputum cx w/ normal respiratory richie  - repeat Sputum cx (10/12) : positive for rare aspergillus  - Aspergillus antigen WNL  - ID, Dr. Juarez on board, recs appreciated  - Pulm Dr. Rojas on board, recs appreciated  - heme/onc Dr. jaramillo's consulted to R/o MM in setting of sputum culture findings and positive SPEP--recommend repeat MGUS studies outpatient  - pending approval for Trilogy machine for home

## 2020-10-21 NOTE — PROGRESS NOTE ADULT - DOES PATIENT HAVE ADVANCE DIRECTIVE
No
No
Patient endorsed to hold nurse for admission, informed to repeat lactate level once fluid completed.

## 2020-10-21 NOTE — PROGRESS NOTE ADULT - SUBJECTIVE AND OBJECTIVE BOX
Patient is a 60y old  Female who presents with a chief complaint of COPD exacerbation, PNA (08 Oct 2020 11:37)       HPI:  60F w/ end stage COPD on 3LNC (pending transplant list at [facility]), former smoker, CAD s/p stent (2016), afib on eliquis, uncontrolled DM2 (on insulin), raynaud phenomenon and recent hospitalization at [facility] for confusion and [facility] Patient states that she has had worsening shortness of breath for past two days. Unable to obtain comprehensive history due to dyspnea. Patient denies fever, chills, sore throat, cough, chest pain, palpitations, abd pain, n/v. Currently feels short of breath even after receiving duonebs and steroids in the ED. Unable to keep mask on during interview and lay back in stretcher due to subjective sob. Patient repeatedly stating that it is too hot in her room and fanning herself with a paper. Per chart, Dr. Mathew (PCP) has chart notes regarding possible hallucinations. Would like her home medications now but otherwise has no acute complaints.  Has not seen nephrologist.  Denies any N/V.  SOB improving.  States she is urinating well.  Denies any urinary retention.  Denies any supplement or NSAID usage.       Follow up acute on CKD Stage 2  No acute events noted    PAST MEDICAL & SURGICAL HISTORY:  H/O Raynaud&#x27;s syndrome    Ascorbic acid deficiency    Iron deficiency anemia    Constipation    GERD without esophagitis    Type 2 diabetes mellitus    HTN (hypertension)    Heart failure    HLD (hyperlipidemia)    History of chronic atrial fibrillation  on Eliquis    End stage COPD  on 3LNC    Atrial fibrillation    Hyperlipemia    Chronic sinusitis    Raynaud phenomenon    HTN (hypertension)    DM (diabetes mellitus)    Claustrophobia    COPD (chronic obstructive pulmonary disease)    History of tonsillectomy    S/P tonsillectomy         FAMILY HISTORY:  FH: myocardial infarction    Family history of CABG    FH: MI (myocardial infarction)    FH: CABG (coronary artery bypass surgery)    NC    Social History:Non smoker    MEDICATIONS  (STANDING):  apixaban 5 milliGRAM(s) Oral every 12 hours  ascorbic acid 500 milliGRAM(s) Oral daily  aspirin  chewable 81 milliGRAM(s) Oral daily  atorvastatin 80 milliGRAM(s) Oral at bedtime  azithromycin  IVPB 500 milliGRAM(s) IV Intermittent every 24 hours  budesonide 160 MICROgram(s)/formoterol 4.5 MICROgram(s) Inhaler 2 Puff(s) Inhalation two times a day  cefepime   IVPB 2000 milliGRAM(s) IV Intermittent every 12 hours  cholecalciferol 1000 Unit(s) Oral daily  dextrose 5%. 1000 milliLiter(s) (50 mL/Hr) IV Continuous <Continuous>  dextrose 50% Injectable 12.5 Gram(s) IV Push once  dextrose 50% Injectable 25 Gram(s) IV Push once  dextrose 50% Injectable 25 Gram(s) IV Push once  ferrous    sulfate 325 milliGRAM(s) Oral daily  insulin glargine Injectable (LANTUS) 10 Unit(s) SubCutaneous at bedtime  insulin lispro (HumaLOG) corrective regimen sliding scale   SubCutaneous three times a day before meals  insulin lispro Injectable (HumaLOG) 3 Unit(s) SubCutaneous three times a day before meals  montelukast 10 milliGRAM(s) Oral at bedtime  multivitamin 1 Tablet(s) Oral daily  pantoprazole    Tablet 40 milliGRAM(s) Oral before breakfast  polyethylene glycol 3350 17 Gram(s) Oral daily  potassium chloride    Tablet ER 40 milliEquivalent(s) Oral every 4 hours  senna 2 Tablet(s) Oral at bedtime  tiotropium 18 MICROgram(s) Capsule 1 Capsule(s) Inhalation daily    MEDICATIONS  (PRN):  acetaminophen   Tablet .. 650 milliGRAM(s) Oral every 6 hours PRN Mild Pain (1 - 3)  bisacodyl Suppository 10 milliGRAM(s) Rectal daily PRN Constipation  dextrose 40% Gel 15 Gram(s) Oral once PRN Blood Glucose LESS THAN 70 milliGRAM(s)/deciliter  glucagon  Injectable 1 milliGRAM(s) IntraMuscular once PRN Glucose LESS THAN 70 milligrams/deciliter  guaiFENesin   Syrup  (Sugar-Free) 100 milliGRAM(s) Oral every 6 hours PRN Cough  lactulose Syrup 15 Gram(s) Oral two times a day PRN constipation  levalbuterol Inhalation 0.63 milliGRAM(s) Inhalation every 4 hours PRN shortness of breath and/or wheezing  oxyCODONE    IR 5 milliGRAM(s) Oral every 6 hours PRN Moderate Pain (4 - 6)   Meds reviewed    Allergies    No Known Allergies    Intolerances    albuterol (Unknown)       REVIEW OF SYSTEMS:    CONSTITUTIONAL:  No weight loss   EYES: No eye pain, visual disturbances, or discharge  ENMT:  No difficulty hearing, tinnitus, vertigo; No sinus or throat pain  NECK: No pain or stiffness  BREASTS: No pain, masses, or nipple discharge  RESPIRATORY: +SOB. +NIELSON  CARDIOVASCULAR: No chest pain, palpitations, dizziness,   GASTROINTESTINAL: No abdominal or epigastric pain. No nausea, vomiting, or hematemesis;  GENITOURINARY: No dysuria, frequency, hematuria, or incontinence  NEUROLOGICAL: No headaches, memory loss, loss of strength, numbness, or tremors  SKIN: no Diffuse erythema, no blisters  LYMPH NODES: No enlarged glands  ENDOCRINE: No heat or cold intolerance; No hair loss  MUSCULOSKELETAL: No joint pain or swelling   PSYCHIATRIC: No depression, anxiety, mood swings, or difficulty sleeping  ALLERGY AND IMMUNOLOGIC: No hives or eczema    ICU Vital Signs Last 24 Hrs  T(C): 36.6 (19 Oct 2020 12:57), Max: 36.8 (18 Oct 2020 21:24)  T(F): 97.9 (19 Oct 2020 12:57), Max: 98.3 (18 Oct 2020 21:24)  HR: 70 (19 Oct 2020 12:57) (70 - 101)  BP: 128/68 (19 Oct 2020 12:57) (126/68 - 135/82)  BP(mean): --  ABP: --  ABP(mean): --  RR: 17 (19 Oct 2020 12:57) (17 - 20)  SpO2: 95% (19 Oct 2020 12:57) (93% - 98%)                PHYSICAL EXAM:    GENERAL: No distress  HEAD:  Atraumatic, Normocephalic  EYES: EOMI, conjunctiva and sclera clear  ENMT: No drainage from nares, no drainage from pinna  NECK: Supple, neck  veins full  NERVOUS SYSTEM:  Alert & Oriented X3, Good concentration; Motor Strength wnl upper and lower extremities  CHEST/LUNG: Decreased BS,no rales, no rhonchi, Mild wheezing  HEART: Regular rate and rhythm; No murmurs, rubs, or gallops  ABDOMEN: Soft, Nontender, Nondistended; Bowel sounds present,  EXTREMITIES: trace Edema  SKIN: No rashes or lesions, pale      LABS:                                                           9.6    9.68  )-----------( 269      ( 21 Oct 2020 08:53 )             31.9     10-20    140  |  97  |  34<H>  ----------------------------<  154<H>  4.1   |  41<H>  |  0.90    Ca    9.6      20 Oct 2020 08:26    TPro  6.5  /  Alb  2.6<L>  /  TBili  0.5  /  DBili  x   /  AST  14<L>  /  ALT  19  /  AlkPhos  63  10-20

## 2020-10-21 NOTE — DISCHARGE NOTE NURSING/CASE MANAGEMENT/SOCIAL WORK - NSDCPEPTCAREGIVEDUMATLIST _GEN_ALL_CORE
Influenza Vaccination/Heart Failure/Diabetes Apixaban/Eliquis/Heart Failure/Diabetes/Influenza Vaccination

## 2020-10-21 NOTE — PROGRESS NOTE ADULT - PROBLEM SELECTOR PLAN 2
on 3LNC at rehab, currently requiring 5L NC with BIPAP, wean as tolerated  - continue spiriva, symbicort, montelukast, inhaler PRN, atrovent   -theophylline held given tachycardia and brief afib  - BIPAP qhs and PRN -- pt counseled to use more frequently   -d/w patient that she is likely not a candidate for transplant. Patient understands

## 2020-10-21 NOTE — PROGRESS NOTE ADULT - SUBJECTIVE AND OBJECTIVE BOX
PULMONARY/CRITICAL CARE      INTERVAL HPI/OVERNIGHT EVENTS:  Somewhat improved. Tried to ambulate with walker.  Sleeps with NIV.    60y FemaleHPI:  60F w/ end stage COPD on 3LNC (pending transplant list at [facility]), former smoker, CAD s/p stent (2016), afib on eliquis, uncontrolled DM2 (on insulin), raynaud phenomenon and recent hospitalization [facility] confusion and [facility] Patient states that she has had worsening shortness of breath for past two days. Unable to obtain comprehensive history due to dyspnea. Patient denies fever, chills, sore throat, cough, chest pain, palpitations, abd pain, n/v. Currently feels short of breath even after receiving duonebs and steroids in the ED. Unable to keep mask on during interview and lay back in stretcher due to subjective sob. Patient repeatedly stating that it is too hot in her room and fanning herself with a paper. Per chart, Dr. aMthew (PCP) has chart notes regarding possible hallucinations. Would like her home medications now but otherwise has no acute complaints.    In the ED, VS T97.7F  /78 RR 21 SpO2 94% on 4LNC. Labs significant for mild leukocytosis of 11.41, H/H 9.9/30.5, thrombophilia of 435. CO2 35, albumin 2.4.   CXR done showing b/l patchy infiltrates, official read pending  CT chest done showing multifocal PNA and reactive mediastinal lymphadenopathy  EKG read limited by artifact, sinus tachycardia with RBBB and premature supraventricular complexes (06 Oct 2020 04:55)        PAST MEDICAL & SURGICAL HISTORY:  Ascorbic acid deficiency    Iron deficiency anemia    Constipation    GERD without esophagitis    Type 2 diabetes mellitus    HTN (hypertension)    Heart failure    End stage COPD  on 3LNC    Atrial fibrillation    Hyperlipemia    Chronic sinusitis    Raynaud phenomenon    Claustrophobia    COPD (chronic obstructive pulmonary disease)    S/P tonsillectomy          ICU Vital Signs Last 24 Hrs  T(C): 36.7 (12 Oct 2020 05:11), Max: 36.8 (11 Oct 2020 13:06)  T(F): 98 (12 Oct 2020 05:11), Max: 98.3 (11 Oct 2020 13:06)  HR: 70 (12 Oct 2020 08:09) (70 - 90)  BP: 138/78 (12 Oct 2020 05:11) (121/73 - 138/78)  BP(mean): --  ABP: --  ABP(mean): --  RR: 17 (12 Oct 2020 05:11) (17 - 22)  SpO2: 99% (12 Oct 2020 08:09) (94% - 100%)    Qtts:     I&O's Summary    11 Oct 2020 07:01  -  12 Oct 2020 07:00  --------------------------------------------------------  IN: 50 mL / OUT: 800 mL / NET: -750 mL              PHYSICAL EXAM:    GENERAL: NAD, well-groomed, well-developed, NAD  HEAD:  Atraumatic, Normocephalic  EYES: EOMI, PERRLA, conjunctiva and sclera clear  ENMT: No tonsillar erythema, exudates, or enlargement; Moist mucous membranes, Good dentition, No lesions  NECK: Supple, No JVD, Normal thyroid  NERVOUS SYSTEM:  Alert & Oriented X3, Good concentration; Motor Strength 5/5 B/L upper and lower extremities  CHEST/LUNG: Clear to percussion bilaterally; No rales, rhonchi, wheezing, or rubs Decreased bs  HEART: Regular rate and rhythm; No murmurs, rubs, or gallops  ABDOMEN: Soft, Nontender, Nondistended; Bowel sounds present  EXTREMITIES:   No clubbing, cyanosis, trace pedal edema legs wrapped  LYMPH: No lymphadenopathy noted  SKIN: No rashes or lesions        LABS:                        8.9    10.88 )-----------( 483      ( 11 Oct 2020 07:04 )             30.4     10-11    139  |  99  |  36<H>  ----------------------------<  317<H>  4.4   |  34<H>  |  0.94    Ca    9.2      11 Oct 2020 07:04    TPro  6.3  /  Alb  2.4<L>  /  TBili  0.2  /  DBili  x   /  AST  12<L>  /  ALT  16  /  AlkPhos  70  10-11          vanco through     RADIOLOGY & ADDITIONAL STUDIES:      CRITICAL CARE TIME SPENT:

## 2020-10-21 NOTE — PROGRESS NOTE ADULT - ASSESSMENT
60F w/ end stage COPD on 3LNC (trying to get on  transplant list at [facility]), former smoker, CAD s/p stent (2016), afib on eliquis, DM2 (on insulin), raynaud phenomenon and recent hospitalization at [facility] for confusion and [facility] p/w sob, admitted for acute on chronic resp failure with hypoxia and hyercarbia sec to multifocal PNA and COPD exacerabtion with end stage COPD.

## 2020-10-21 NOTE — DISCHARGE NOTE NURSING/CASE MANAGEMENT/SOCIAL WORK - PATIENT PORTAL LINK FT
You can access the FollowMyHealth Patient Portal offered by [facility] by registering at the following website: [email or web address]. By joining [email or web address] FollowMyHealth portal, you will also be able to view your health information using other applications (apps) compatible with our system.

## 2020-10-21 NOTE — PROGRESS NOTE ADULT - SUBJECTIVE AND OBJECTIVE BOX
[facility] -- Adriana Gonzales, Funmilayo Fuentes, Dylan Cormier, Paz Plunkett: Office # [phone]    Follow Up:  SOB    Subjective/Observations: Patient seen and examined. Patient awake, alert, resting in bed. No complaints of chest pain, dyspnea, palpitations or dizziness. States she improved a lot, yet + NIELSON    REVIEW OF SYSTEMS: All review of systems is negative for eye, ENT, GI, , allergic, dermatologic, musculoskeletal and neurologic except as described above    PAST MEDICAL & SURGICAL HISTORY:  Ascorbic acid deficiency    Iron deficiency anemia    Constipation    GERD without esophagitis    Type 2 diabetes mellitus    HTN (hypertension)    Heart failure    End stage COPD  on 3LNC    Atrial fibrillation    Hyperlipemia    Chronic sinusitis    Raynaud phenomenon    Claustrophobia    COPD (chronic obstructive pulmonary disease)    S/P tonsillectomy        MEDICATIONS  (STANDING):  apixaban 5 milliGRAM(s) Oral every 12 hours  aspirin  chewable 81 milliGRAM(s) Oral daily  atorvastatin 80 milliGRAM(s) Oral at bedtime  azithromycin   Tablet 500 milliGRAM(s) Oral daily  Biotene Dry Mouth Oral Rinse 5 milliLiter(s) Swish and Spit two times a day  budesonide 160 MICROgram(s)/formoterol 4.5 MICROgram(s) Inhaler 2 Puff(s) Inhalation two times a day  buMETAnide 1 milliGRAM(s) Oral two times a day  cholecalciferol 1000 Unit(s) Oral daily  dextrose 5%. 1000 milliLiter(s) (50 mL/Hr) IV Continuous <Continuous>  dextrose 50% Injectable 12.5 Gram(s) IV Push once  dextrose 50% Injectable 25 Gram(s) IV Push once  dextrose 50% Injectable 25 Gram(s) IV Push once  diltiazem    milliGRAM(s) Oral daily  ferrous    sulfate 325 milliGRAM(s) Oral daily  guaiFENesin  milliGRAM(s) Oral every 12 hours  insulin glargine Injectable (LANTUS) 12 Unit(s) SubCutaneous at bedtime  insulin lispro (ADMELOG) corrective regimen sliding scale   SubCutaneous at bedtime  insulin lispro (ADMELOG) corrective regimen sliding scale.   SubCutaneous three times a day before meals  insulin lispro Injectable (ADMELOG) 4 Unit(s) SubCutaneous three times a day before meals  ipratropium    for Nebulization 500 MICROGram(s) Nebulizer every 6 hours  montelukast 10 milliGRAM(s) Oral at bedtime  multivitamin 1 Tablet(s) Oral daily  pantoprazole    Tablet 40 milliGRAM(s) Oral before breakfast  polyethylene glycol 3350 17 Gram(s) Oral daily  predniSONE   Tablet 20 milliGRAM(s) Oral daily  senna 2 Tablet(s) Oral at bedtime  tiotropium 18 MICROgram(s) Capsule 1 Capsule(s) Inhalation daily    MEDICATIONS  (PRN):  acetaminophen   Tablet .. 650 milliGRAM(s) Oral every 6 hours PRN Mild Pain (1 - 3)  ALPRAZolam 0.25 milliGRAM(s) Oral every 8 hours PRN anxiety  bisacodyl Suppository 10 milliGRAM(s) Rectal daily PRN Constipation  dextrose 40% Gel 15 Gram(s) Oral once PRN Blood Glucose LESS THAN 70 milliGRAM(s)/deciliter  glucagon  Injectable 1 milliGRAM(s) IntraMuscular once PRN Glucose LESS THAN 70 milligrams/deciliter  lactulose Syrup 15 Gram(s) Oral two times a day PRN constipation      Allergies    No Known Allergies    Intolerances    albuterol (Unknown)      Vital Signs Last 24 Hrs  T(C): 36.8 (21 Oct 2020 06:46), Max: 36.9 (20 Oct 2020 12:41)  T(F): 98.3 (21 Oct 2020 06:46), Max: 98.5 (20 Oct 2020 12:41)  HR: 66 (21 Oct 2020 08:21) (66 - 81)  BP: 133/75 (21 Oct 2020 06:46) (133/75 - 152/76)  BP(mean): --  RR: 19 (21 Oct 2020 06:46) (19 - 20)  SpO2: 94% (21 Oct 2020 08:21) (94% - 100%)    I&O's Summary    20 Oct 2020 07:01  -  21 Oct 2020 07:00  --------------------------------------------------------  IN: 0 mL / OUT: 2200 mL / NET: -2200 mL          TELE: Not on telemetry   PHYSICAL EXAM:  Appearance: NAD, no distress, alert, Well developed   HEENT: Moist Mucous Membranes, Anicteric  Cardiovascular: Regular rate and rhythm, Normal S1 S2, No JVD, No murmurs, No rubs, gallops or clicks  Respiratory: Non-labored, Clear to auscultation, No rales, No rhonchi, No wheezing.   Gastrointestinal:  Soft, Non-tender, + BS  Neurologic: Non-focal  Skin: Warm and dry, No visible rashes or ulcers, No ecchymosis, No cyanosis  Musculoskeletal: No clubbing, No cyanosis, No joint swelling/tenderness  Psychiatry: Mood & affect appropriate  Lymph: +1 peripheral edema.     LABS: All Labs Reviewed:                        9.6    9.68  )-----------( 269      ( 21 Oct 2020 08:53 )             31.9                         8.9    11.95 )-----------( 290      ( 20 Oct 2020 08:26 )             30.3                         10.0   13.77 )-----------( 308      ( 19 Oct 2020 09:26 )             32.6     21 Oct 2020 08:53    141    |  97     |  36     ----------------------------<  145    3.9     |  42     |  0.98   20 Oct 2020 08:26    140    |  97     |  34     ----------------------------<  154    4.1     |  41     |  0.90   19 Oct 2020 09:26    140    |  96     |  35     ----------------------------<  141    4.0     |  37     |  1.10     Ca    9.7        21 Oct 2020 08:53  Ca    9.6        20 Oct 2020 08:26  Ca    9.4        19 Oct 2020 09:26    TPro  6.0    /  Alb  2.5    /  TBili  0.3    /  DBili  x      /  AST  14     /  ALT  21     /  AlkPhos  61     21 Oct 2020 08:53  TPro  6.5    /  Alb  2.6    /  TBili  0.5    /  DBili  x      /  AST  14     /  ALT  19     /  AlkPhos  63     20 Oct 2020 08:26  TPro  7.0    /  Alb  2.7    /  TBili  0.4    /  DBili  x      /  AST  13     /  ALT  19     /  AlkPhos  69     19 Oct 2020 09:26    12 Lead ECG:   Ventricular Rate 109 BPM  Atrial Rate 109 BPM  P-R Interval 150 ms  QRS Duration 136 ms  Q-T Interval 390 ms  QTC Calculation(Bazett) 525 ms  P Axis 56 degrees  R Axis -16 degrees  T Axis 57 degrees  Diagnosis Line Sinus tachycardia with premature supraventricular complexes  Right bundle branch block  Abnormal ECG  Confirmed by LUIS ENRIQUE CORMIER ([id number]) on 10/8/2020 11:41:04 AM (10-08-20 @ 08:51)    < from: TTE Echo Complete w/o Contrast w/ Doppler (10.06.20 @ 17:10) >  Dimensions:  LA 3.7       Normal Values: 2.0 - 4.0 cm  Ao 2.7        Normal Values: 2.0 - 3.8 cm  SEPTUM 1.4       Normal Values: 0.6 - 1.2 cm  PWT 1.3       Normal Values: 0.6 - 1.1 cm  LVIDd 3.9         Normal Values: 3.0 - 5.6 cm  LVIDs 2.8         Normal Values: 1.8 - 4.0 cm    OBSERVATIONS:  Technically difficult and limited study  Mitral Valve: Thickened leaflets, mild MR.  Aortic Valve/Aorta: Aortic valve is not well-visualized, grossly normal function without severe pathology  Tricuspid Valve: normal with trace TR.  Pulmonic Valve: Not well-visualized  Left Atrium: normal  Right Atrium: Not well-visualized  Left Ventricle: normal LV size and systolic function, estimated LVEF of 55%. Mild LVH. Endocardium is not well-visualized, cannot rule out segmental dysfunction  Right Ventricle: Grossly normal size and systolic function.  Pericardium/Pleura: normal, no significant pericardial effusion.  Pulmonary/RV Pressure: estimated PA systolic pressure of 15.7 mmHg assuming an RA pressure of 3 mmHg.  LV diastolic dysfunction is present    Conclusion:  Technically difficult and limited study  Mild concentric LVH with grossly normal left ventricular systolic function, estimated LVEF of 55%.  Grossly normal RV size and systolic function.  Aortic valve is not well-visualized, grossly normal function without severe pathology  Mild MR  Trace TR.  No significant pericardial effusion.    < end of copied text >    < from: Cardiac Cath Lab - Adult (03.24.17 @ 11:16) >  VENTRICLES: Global left ventricular function was normal. EF estimated was  65 %.  CORONARY VESSELS: The coronary circulation is right dominant.  LM:   --  Mid left main: There was a 30 % stenosis.  LAD:   --  Ostial LAD: There was a 40 % stenosis.  CX:   --  Circumflex: Normal.  RCA:   --  RCA: Normal.  COMPLICATIONS: There were no complications.  DIAGNOSTIC RECOMMENDATIONS: The patient should continue with the present  medications.  < end of copied text >

## 2020-10-21 NOTE — PROGRESS NOTE ADULT - SUBJECTIVE AND OBJECTIVE BOX
Patient is a 60y old  Female who presents with a chief complaint of COPD exacerbation, PNA (21 Oct 2020 10:28)      INTERVAL HPI/OVERNIGHT EVENTS: Patient seen and examined at bedside. No events overnight. Tolerating BIPAP well. Patient states her breathing is similar to yesterday. States that her lower extremity edema is improved. Denies any HA, CP, abdominal pain, n/v.    MEDICATIONS  (STANDING):  apixaban 5 milliGRAM(s) Oral every 12 hours  aspirin  chewable 81 milliGRAM(s) Oral daily  atorvastatin 80 milliGRAM(s) Oral at bedtime  azithromycin   Tablet 500 milliGRAM(s) Oral daily  Biotene Dry Mouth Oral Rinse 5 milliLiter(s) Swish and Spit two times a day  budesonide 160 MICROgram(s)/formoterol 4.5 MICROgram(s) Inhaler 2 Puff(s) Inhalation two times a day  buMETAnide 1 milliGRAM(s) Oral two times a day  cholecalciferol 1000 Unit(s) Oral daily  dextrose 5%. 1000 milliLiter(s) (50 mL/Hr) IV Continuous <Continuous>  dextrose 50% Injectable 12.5 Gram(s) IV Push once  dextrose 50% Injectable 25 Gram(s) IV Push once  dextrose 50% Injectable 25 Gram(s) IV Push once  diltiazem    milliGRAM(s) Oral daily  ferrous    sulfate 325 milliGRAM(s) Oral daily  guaiFENesin  milliGRAM(s) Oral every 12 hours  insulin glargine Injectable (LANTUS) 12 Unit(s) SubCutaneous at bedtime  insulin lispro (ADMELOG) corrective regimen sliding scale   SubCutaneous at bedtime  insulin lispro (ADMELOG) corrective regimen sliding scale.   SubCutaneous three times a day before meals  insulin lispro Injectable (ADMELOG) 4 Unit(s) SubCutaneous three times a day before meals  ipratropium    for Nebulization 500 MICROGram(s) Nebulizer every 6 hours  montelukast 10 milliGRAM(s) Oral at bedtime  multivitamin 1 Tablet(s) Oral daily  pantoprazole    Tablet 40 milliGRAM(s) Oral before breakfast  polyethylene glycol 3350 17 Gram(s) Oral daily  predniSONE   Tablet 20 milliGRAM(s) Oral daily  senna 2 Tablet(s) Oral at bedtime  tiotropium 18 MICROgram(s) Capsule 1 Capsule(s) Inhalation daily    MEDICATIONS  (PRN):  acetaminophen   Tablet .. 650 milliGRAM(s) Oral every 6 hours PRN Mild Pain (1 - 3)  ALPRAZolam 0.25 milliGRAM(s) Oral every 8 hours PRN anxiety  bisacodyl Suppository 10 milliGRAM(s) Rectal daily PRN Constipation  dextrose 40% Gel 15 Gram(s) Oral once PRN Blood Glucose LESS THAN 70 milliGRAM(s)/deciliter  glucagon  Injectable 1 milliGRAM(s) IntraMuscular once PRN Glucose LESS THAN 70 milligrams/deciliter  lactulose Syrup 15 Gram(s) Oral two times a day PRN constipation      Allergies    No Known Allergies    Intolerances    albuterol (Unknown)      REVIEW OF SYSTEMS:  CONSTITUTIONAL: No fever or chills  HEENT:  No headache, no sore throat  RESPIRATORY: No cough, wheezing, Positive for shortness of breath  CARDIOVASCULAR: No chest pain, palpitations. Positive for LE edema  GASTROINTESTINAL: No abd pain, nausea, vomiting, or diarrhea  GENITOURINARY: No dysuria, frequency, or hematuria  NEUROLOGICAL: no focal weakness or dizziness  MUSCULOSKELETAL: no myalgias     Vital Signs Last 24 Hrs  T(C): 36.8 (21 Oct 2020 06:46), Max: 36.9 (20 Oct 2020 12:41)  T(F): 98.3 (21 Oct 2020 06:46), Max: 98.5 (20 Oct 2020 12:41)  HR: 66 (21 Oct 2020 08:21) (66 - 81)  BP: 133/75 (21 Oct 2020 06:46) (133/75 - 152/76)  BP(mean): --  RR: 19 (21 Oct 2020 06:46) (19 - 20)  SpO2: 94% (21 Oct 2020 08:21) (94% - 100%)    PHYSICAL EXAM:  GENERAL: NAD, laying in bed comfortably, on NC  HEENT:  anicteric, moist mucous membranes  CHEST/LUNG:  Lungs w/ decreased BS in all fields. No wheezes or rhonchi  HEART:  RRR, S1, S2  ABDOMEN:  BS+, soft, nontender, nondistended  EXTREMITIES: no cyanosis, or calf tenderness. Lower legs wrapped in ACE bandage. 1+ pitting edema of RLE up to knee improved from yesterday. LLE w/ trace edema  NERVOUS SYSTEM: answers questions and follows commands appropriately    LABS:                        9.6    9.68  )-----------( 269      ( 21 Oct 2020 08:53 )             31.9     CBC Full  -  ( 21 Oct 2020 08:53 )  WBC Count : 9.68 K/uL  Hemoglobin : 9.6 g/dL  Hematocrit : 31.9 %  Platelet Count - Automated : 269 K/uL  Mean Cell Volume : 82.0 fl  Mean Cell Hemoglobin : 24.7 pg  Mean Cell Hemoglobin Concentration : 30.1 gm/dL  Auto Neutrophil # : 6.91 K/uL  Auto Lymphocyte # : 1.41 K/uL  Auto Monocyte # : 0.95 K/uL  Auto Eosinophil # : 0.23 K/uL  Auto Basophil # : 0.03 K/uL  Auto Neutrophil % : 71.4 %  Auto Lymphocyte % : 14.6 %  Auto Monocyte % : 9.8 %  Auto Eosinophil % : 2.4 %  Auto Basophil % : 0.3 %    21 Oct 2020 08:53    141    |  97     |  36     ----------------------------<  145    3.9     |  42     |  0.98     Ca    9.7        21 Oct 2020 08:53    TPro  6.0    /  Alb  2.5    /  TBili  0.3    /  DBili  x      /  AST  14     /  ALT  21     /  AlkPhos  61     21 Oct 2020 08:53        CAPILLARY BLOOD GLUCOSE      POCT Blood Glucose.: 206 mg/dL (21 Oct 2020 12:15)  POCT Blood Glucose.: 135 mg/dL (21 Oct 2020 08:21)  POCT Blood Glucose.: 189 mg/dL (20 Oct 2020 22:02)  POCT Blood Glucose.: 168 mg/dL (20 Oct 2020 17:25)          RADIOLOGY & ADDITIONAL TESTS:    Personally reviewed.     Consultant(s) Notes Reviewed:  [x] YES  [ ] NO

## 2020-10-21 NOTE — PROGRESS NOTE ADULT - ASSESSMENT
AURORA on CKD 2  Hyponatremia  Hypokalemia  COPD  Hypercalcemia  Metabolic Alkalosis     -BLCr 1  -AURORA unclear etiology, clinically ATN  -UA - 3-5 WBC, trace ketones, 30 proteins  -FeUrea 34.6%  -UPC 0.44  -Ur osm 454  -Corrected calcium 10.2, with paraprotein gap and anemia; FLC ratio is normal; SPEP with gamma migrating protein seen  -Iron repletion   -Metformin on hold  -Renal indices are stable at baseline; Continue with Bumex as ordered; will follow up repeat chemistries  -Less likely AIN given paucity of WBC on UA and no peripheral eosinophils  -Strict I&Os  -Pulm follow up noted  -Heme/onc note reviewed.  Possible MGUS, repeat studies as outpatient  -Alkalosis likely 2/2 compensation for respiratory acidosis; recommend to repeat ABG if the chemistries show worsening bicarb levels; may need Diamox. Pulm follow up    Thank you

## 2020-10-21 NOTE — PROGRESS NOTE ADULT - ASSESSMENT
60F w/ end stage COPD on 3LNC (trying to get on  transplant list at [facility]), former smoker, CAD s/p stent (2016), afib on eliquis, uncontrolled DM2 (on insulin), raynaud phenomenon and recent hospitalization at [facility] for confusion and AURORA p/w sob, admitted for acute on chronic resp failure with hypoxia and hyercarbia sec to multifocal PNA and COPD exacerbation with end stage COPD.     1) Acute on chronic respiratory failure with hypoxia and hypercapnia - multifactorial from multifocal PNA and COPD exacerbation/End stage COPD/stage 1 diastolic dysfunction CHF (EF 55%)  2) Failure to thrive  3) Goals of care/advance care planning  - Palliative performance scale score: 40% (poor)  - Prognosis: days-weeks (pt is hospice eligible)   - Code status: DNR/DNI (MOLST form in the chart)   - GOC:  continue antibiotics and aggressive medical management. Continue Bipap. Pt initially wanted to pursue lung transplant (pt is NOT on a transplant list) however understands that likelihood is likely non-existing.  Pt stated that she would like to avoid rehospitalization and would like to reduce suffering but is not ready to discuss hospice care.  - HCP: pt's brothers Michele 1ry and Alfonso 2ry  - Psychosocial support provided    4) Dyspnea - multifactorial  5) Anxiety - now exacerbated by worsening dyspnea & emotional fear of decline  - pt took off fentanyl patch  (though initially helped relief her SOB) and asked not to restart it as "she's not dying". Provided education on benefits of opioid use for symptom control.   - continue oxycodone prn  - could benefit from starting SSRI (discussed today but pt declined)  - bipap & O2 as per primary team    Discussed with the primary team. Will continue to follow.     Suad So MD

## 2020-10-21 NOTE — PROGRESS NOTE ADULT - ASSESSMENT
60F w/ end stage COPD on 3LNC (pending transplant list at [facility]), former smoker, CAD s/p stent (2016), Afib on Eliquis,  uncontrolled DM2 (on insulin), raynaud phenomenon and recent hospitalization at [facility] for confusion and AURORA p/w sob, admitted for COPD exacerbation and multifocal PNA    CAD s/p stent   - No clinical evidence of ACS  - Continue asa and statin  - EKG showed SR @ 109, RBBB that is chronic  - Continue Bumex to 1mg PO- 12  (home dose)    Paroxysmal Afib  - Continue Eliquis 5mg  - Continue Cardizem CD at 240mg   - Monitor electrolytes, replete to keep K>4 and Mag>2  - TTE w/ mild concentric LVH grossly nL LVSF ef 55%, mild MR    End stage COPD/Pneumonia  - Pulm following.  Per note, patient is on waiting list for transplant  - CT chest noted  - Continue steroids and antibiotics per Pulm  - Continue NC and BIPAP/CPAP prn/nocturnally  - Continue to encourage incentive spirometer    VTE ppx  - On Eliquis    - Monitor and replete lytes, keep K>4, Mg>2.  - All other medical needs as per primary team.  - Other cardiovascular workup will depend on clinical course.  - Will continue to follow.    Lisa Soto, MS FNP, AGAP  Nurse Practitioner- Cardiology   Spectra #[phone]

## 2020-10-21 NOTE — DISCHARGE NOTE NURSING/CASE MANAGEMENT/SOCIAL WORK - NSDCFUADDAPPT_GEN_ALL_CORE_FT
Follow up with your primary care physician Dr. Mathew within 1 week of discharge.   Follow up with infectious disease Dr. Juarez within 1 week of discharge.   Follow up with hematology within 12 weeks of discharge

## 2020-10-21 NOTE — PROGRESS NOTE ADULT - FAMILY/RELATIVE
Brothers Michele & Alfonso together with sister in law Odilia
Brothers Michele & Alfonso together with sister in law Odilia

## 2020-10-22 DIAGNOSIS — D47.2 MONOCLONAL GAMMOPATHY: ICD-10-CM

## 2020-10-22 LAB
ALBUMIN SERPL ELPH-MCNC: 2.9 G/DL — LOW (ref 3.3–5)
ALP SERPL-CCNC: 69 U/L — SIGNIFICANT CHANGE UP (ref 40–120)
ALT FLD-CCNC: 22 U/L — SIGNIFICANT CHANGE UP (ref 12–78)
ANION GAP SERPL CALC-SCNC: 4 MMOL/L — LOW (ref 5–17)
AST SERPL-CCNC: 15 U/L — SIGNIFICANT CHANGE UP (ref 15–37)
BASOPHILS # BLD AUTO: 0.03 K/UL — SIGNIFICANT CHANGE UP (ref 0–0.2)
BASOPHILS NFR BLD AUTO: 0.3 % — SIGNIFICANT CHANGE UP (ref 0–2)
BILIRUB SERPL-MCNC: 0.6 MG/DL — SIGNIFICANT CHANGE UP (ref 0.2–1.2)
BUN SERPL-MCNC: 31 MG/DL — HIGH (ref 7–23)
CALCIUM SERPL-MCNC: 10.2 MG/DL — HIGH (ref 8.5–10.1)
CHLORIDE SERPL-SCNC: 94 MMOL/L — LOW (ref 96–108)
CO2 SERPL-SCNC: 42 MMOL/L — HIGH (ref 22–31)
CREAT SERPL-MCNC: 1.1 MG/DL — SIGNIFICANT CHANGE UP (ref 0.5–1.3)
EOSINOPHIL # BLD AUTO: 0.33 K/UL — SIGNIFICANT CHANGE UP (ref 0–0.5)
EOSINOPHIL NFR BLD AUTO: 3.1 % — SIGNIFICANT CHANGE UP (ref 0–6)
GLUCOSE SERPL-MCNC: 125 MG/DL — HIGH (ref 70–99)
HCT VFR BLD CALC: 36.8 % — SIGNIFICANT CHANGE UP (ref 34.5–45)
HGB BLD-MCNC: 11.2 G/DL — LOW (ref 11.5–15.5)
IMM GRANULOCYTES NFR BLD AUTO: 1.3 % — SIGNIFICANT CHANGE UP (ref 0–1.5)
LYMPHOCYTES # BLD AUTO: 0.94 K/UL — LOW (ref 1–3.3)
LYMPHOCYTES # BLD AUTO: 8.9 % — LOW (ref 13–44)
MCHC RBC-ENTMCNC: 24.9 PG — LOW (ref 27–34)
MCHC RBC-ENTMCNC: 30.4 GM/DL — LOW (ref 32–36)
MCV RBC AUTO: 82 FL — SIGNIFICANT CHANGE UP (ref 80–100)
MONOCYTES # BLD AUTO: 0.75 K/UL — SIGNIFICANT CHANGE UP (ref 0–0.9)
MONOCYTES NFR BLD AUTO: 7.1 % — SIGNIFICANT CHANGE UP (ref 2–14)
NEUTROPHILS # BLD AUTO: 8.34 K/UL — HIGH (ref 1.8–7.4)
NEUTROPHILS NFR BLD AUTO: 79.3 % — HIGH (ref 43–77)
NRBC # BLD: 0 /100 WBCS — SIGNIFICANT CHANGE UP (ref 0–0)
PLATELET # BLD AUTO: 278 K/UL — SIGNIFICANT CHANGE UP (ref 150–400)
POTASSIUM SERPL-MCNC: 3.9 MMOL/L — SIGNIFICANT CHANGE UP (ref 3.5–5.3)
POTASSIUM SERPL-SCNC: 3.9 MMOL/L — SIGNIFICANT CHANGE UP (ref 3.5–5.3)
PROT SERPL-MCNC: 7.3 G/DL — SIGNIFICANT CHANGE UP (ref 6–8.3)
RBC # BLD: 4.49 M/UL — SIGNIFICANT CHANGE UP (ref 3.8–5.2)
RBC # FLD: 18.6 % — HIGH (ref 10.3–14.5)
SODIUM SERPL-SCNC: 140 MMOL/L — SIGNIFICANT CHANGE UP (ref 135–145)
WBC # BLD: 10.53 K/UL — HIGH (ref 3.8–10.5)
WBC # FLD AUTO: 10.53 K/UL — HIGH (ref 3.8–10.5)

## 2020-10-22 PROCEDURE — 99233 SBSQ HOSP IP/OBS HIGH 50: CPT | Mod: GC

## 2020-10-22 PROCEDURE — 93971 EXTREMITY STUDY: CPT | Mod: 26,RT

## 2020-10-22 PROCEDURE — 99232 SBSQ HOSP IP/OBS MODERATE 35: CPT

## 2020-10-22 RX ORDER — IPRATROPIUM/ALBUTEROL SULFATE 18-103MCG
1.5 AEROSOL WITH ADAPTER (GRAM) INHALATION ONCE
Refills: 0 | Status: COMPLETED | OUTPATIENT
Start: 2020-10-22 | End: 2020-10-22

## 2020-10-22 RX ADMIN — Medication 81 MILLIGRAM(S): at 12:33

## 2020-10-22 RX ADMIN — Medication 600 MILLIGRAM(S): at 17:19

## 2020-10-22 RX ADMIN — Medication 600 MILLIGRAM(S): at 06:24

## 2020-10-22 RX ADMIN — BUDESONIDE AND FORMOTEROL FUMARATE DIHYDRATE 2 PUFF(S): 160; 4.5 AEROSOL RESPIRATORY (INHALATION) at 18:56

## 2020-10-22 RX ADMIN — Medication 1000 UNIT(S): at 12:34

## 2020-10-22 RX ADMIN — PANTOPRAZOLE SODIUM 40 MILLIGRAM(S): 20 TABLET, DELAYED RELEASE ORAL at 06:24

## 2020-10-22 RX ADMIN — Medication 1.5 MILLILITER(S): at 19:58

## 2020-10-22 RX ADMIN — Medication 10: at 12:34

## 2020-10-22 RX ADMIN — MONTELUKAST 10 MILLIGRAM(S): 4 TABLET, CHEWABLE ORAL at 22:02

## 2020-10-22 RX ADMIN — Medication 1 TABLET(S): at 12:33

## 2020-10-22 RX ADMIN — Medication 4 UNIT(S): at 12:35

## 2020-10-22 RX ADMIN — INSULIN GLARGINE 12 UNIT(S): 100 INJECTION, SOLUTION SUBCUTANEOUS at 22:03

## 2020-10-22 RX ADMIN — Medication 5 MILLILITER(S): at 06:25

## 2020-10-22 RX ADMIN — ATORVASTATIN CALCIUM 80 MILLIGRAM(S): 80 TABLET, FILM COATED ORAL at 22:22

## 2020-10-22 RX ADMIN — BUDESONIDE AND FORMOTEROL FUMARATE DIHYDRATE 2 PUFF(S): 160; 4.5 AEROSOL RESPIRATORY (INHALATION) at 07:30

## 2020-10-22 RX ADMIN — APIXABAN 5 MILLIGRAM(S): 2.5 TABLET, FILM COATED ORAL at 17:19

## 2020-10-22 RX ADMIN — TIOTROPIUM BROMIDE 1 CAPSULE(S): 18 CAPSULE ORAL; RESPIRATORY (INHALATION) at 22:02

## 2020-10-22 RX ADMIN — BUMETANIDE 1 MILLIGRAM(S): 0.25 INJECTION INTRAMUSCULAR; INTRAVENOUS at 17:19

## 2020-10-22 RX ADMIN — Medication 4 UNIT(S): at 08:39

## 2020-10-22 RX ADMIN — BUDESONIDE AND FORMOTEROL FUMARATE DIHYDRATE 2 PUFF(S): 160; 4.5 AEROSOL RESPIRATORY (INHALATION) at 17:19

## 2020-10-22 RX ADMIN — Medication 20 MILLIGRAM(S): at 06:24

## 2020-10-22 RX ADMIN — Medication 240 MILLIGRAM(S): at 06:24

## 2020-10-22 RX ADMIN — TIOTROPIUM BROMIDE 1 CAPSULE(S): 18 CAPSULE ORAL; RESPIRATORY (INHALATION) at 00:17

## 2020-10-22 RX ADMIN — POLYETHYLENE GLYCOL 3350 17 GRAM(S): 17 POWDER, FOR SOLUTION ORAL at 12:34

## 2020-10-22 RX ADMIN — Medication 325 MILLIGRAM(S): at 12:33

## 2020-10-22 RX ADMIN — BUMETANIDE 1 MILLIGRAM(S): 0.25 INJECTION INTRAMUSCULAR; INTRAVENOUS at 06:24

## 2020-10-22 RX ADMIN — APIXABAN 5 MILLIGRAM(S): 2.5 TABLET, FILM COATED ORAL at 06:24

## 2020-10-22 RX ADMIN — Medication 4 UNIT(S): at 17:18

## 2020-10-22 RX ADMIN — Medication 500 MICROGRAM(S): at 13:11

## 2020-10-22 RX ADMIN — AZITHROMYCIN 500 MILLIGRAM(S): 500 TABLET, FILM COATED ORAL at 12:34

## 2020-10-22 RX ADMIN — SENNA PLUS 2 TABLET(S): 8.6 TABLET ORAL at 22:02

## 2020-10-22 NOTE — PROGRESS NOTE ADULT - PROBLEM SELECTOR PLAN 1
likely multifactorial from multifocal PNA in setting of end stage COPD   - slow clinical improvement  - currently on 5L NC with BIPAP qhs -- maintain O2 >/ 88  - Continue Bumex 1mg BID  - Blood culture negative, initial Sputum cx w/ normal respiratory richie  - repeat Sputum cx (10/12) : positive for rare aspergillus  - Aspergillus antigen WNL  - ID, Dr. Juarez on board, recs appreciated  - Pulm Dr. Rojas on board, recs appreciated  - heme/onc Dr. jaramillo's consulted to R/o MM in setting of sputum culture findings and positive SPEP--recommend repeat MGUS studies outpatient  - pending approval for Trilogy machine for home likely multifactorial from multifocal PNA in setting of end stage COPD   - slow clinical improvement  - currently on 5L NC with BIPAP qhs -- maintain O2 >/ 88  - Continue Bumex 1mg BID  - Blood culture negative, initial Sputum cx w/ normal respiratory richie  - repeat Sputum cx (10/12) : positive for rare aspergillus  - Aspergillus antigen WNL  - ID, Dr. Juarez on board, recs appreciated  - Pulm Dr. Rojas on board, recs appreciated  - pending approval for Trilogy machine for home

## 2020-10-22 NOTE — PROGRESS NOTE ADULT - PROBLEM SELECTOR PLAN 8
chronic, resolved   - cont senna and miralax standing  - lactulose and dulcolax suppository PRN for constipation Patient in NSR  - on eliquis 5mg bid and cardizem 240mg qd (home meds)  - hold theophylline and monitor on remote tele, apprec pharmD Dr Ackerman collaboration in therapeutic management and discussion/education with patient  - D/w Cardio (Dr. Whitley) - Cardizem 240 mg qd  -Cardio rec (Dr. Richardson): consider restarting Duoneb; continue eliquis and cardiazem; PT for deconditioning

## 2020-10-22 NOTE — PROGRESS NOTE ADULT - ASSESSMENT
60F w/ end stage COPD on 3LNC (pending transplant list at [facility]), former smoker, CAD s/p stent (2016), Afib on Eliquis,  uncontrolled DM2 (on insulin), raynaud phenomenon and recent hospitalization at [facility] for confusion and AURORA p/w sob, admitted for COPD exacerbation and multifocal PNA    CAD s/p stent   - No clinical evidence of ACS  - Continue asa and statin  - EKG showed SR @ 109, RBBB that is chronic  - Continue Bumex to 1mg PO- 12  (home dose)    Paroxysmal Afib  - Continue Eliquis 5mg  - Continue Cardizem CD at 240mg   - Monitor electrolytes, replete to keep K>4 and Mag>2  - TTE w/ mild concentric LVH grossly nL LVSF ef 55%, mild MR    End stage COPD/Pneumonia  - Pulm following.  Per note, patient is on waiting list for transplant  - CT chest noted  - Continue steroids and antibiotics per Pulm  - Continue NC and BIPAP/CPAP prn/nocturnally  - Continue to encourage incentive spirometer    VTE ppx  - On Eliquis    - Monitor and replete lytes, keep K>4, Mg>2.  - All other medical needs as per primary team.  - Other cardiovascular workup will depend on clinical course.  - Will continue to follow.  David Albert DNP, ANP-c  Cardiology   Spectra #[phone]

## 2020-10-22 NOTE — PROGRESS NOTE ADULT - PROBLEM SELECTOR PLAN 6
BP stable  -on cardizem at rehab, continue  -monitor routine hemodynamics on metformin and glargine 12 U qhs, hold metformin while in hospital  - glargine 10units qhs  - continue humalog 3 units TID premeal   - moderate dose ISS  - accuchecks, hypoglycemia protocol  - Hba1c: 6.0

## 2020-10-22 NOTE — PROGRESS NOTE ADULT - ASSESSMENT
Pt. with endstage COPD on transplant list, with acute pneumonia.  Pt  to go home today. To have home PT.  Agree with need for nocturnal NIV/Trilogy.  Improved clinically.    Unlikely candidate for lung transplant.  Prognosis overall poor.     FU Dr Mathew as outpt.   Can taper steroids.     Inhalers.

## 2020-10-22 NOTE — PROGRESS NOTE ADULT - ATTENDING COMMENTS
60F w/ end stage COPD on 3LNC (trying to get on  transplant list at [facility]), former smoker, CAD s/p stent (2016), afib on eliquis, DM2 (on insulin), raynaud phenomenon and recent hospitalization at [facility] for confusion and AURORA p/w sob, admitted for acute on chronic resp failure with hypoxia and hyercarbia sec to multifocal PNA and COPD exacerabtion with end stage COPD. Plan: slowly improving, duonebs started at half dose 1.5mg, if pt tolerates then continue q6hrs, once trialogy approved stable for dc home

## 2020-10-22 NOTE — PROGRESS NOTE ADULT - SUBJECTIVE AND OBJECTIVE BOX
Patient is a 60y old  Female who presents with a chief complaint of COPD exacerbation, PNA (08 Oct 2020 11:37)       HPI:  60F w/ end stage COPD on 3LNC (pending transplant list at [facility]), former smoker, CAD s/p stent (2016), afib on eliquis, uncontrolled DM2 (on insulin), raynaud phenomenon and recent hospitalization at [facility] for confusion and [facility] p[facility] sob. [facility] Patient states that she has had worsening shortness of breath for past two days. Unable to obtain comprehensive history due to dyspnea. Patient denies fever, chills, sore throat, cough, chest pain, palpitations, abd pain, n/v. Currently feels short of breath even after receiving duonebs and steroids in the ED. Unable to keep mask on during interview and lay back in stretcher due to subjective sob. Patient repeatedly stating that it is too hot in her room and fanning herself with a paper. Per chart, Dr. Mathew (PCP) has chart notes regarding possible hallucinations. Would like her home medications now but otherwise has no acute complaints.  Has not seen nephrologist.  Denies any N/V.  SOB improving.  States she is urinating well.  Denies any urinary retention.  Denies any supplement or NSAID usage.       Follow up acute on CKD Stage 2  No acute events noted, edema improving    PAST MEDICAL & SURGICAL HISTORY:  H/O Raynaud&#x27;s syndrome    Ascorbic acid deficiency    Iron deficiency anemia    Constipation    GERD without esophagitis    Type 2 diabetes mellitus    HTN (hypertension)    Heart failure    HLD (hyperlipidemia)    History of chronic atrial fibrillation  on Eliquis    End stage COPD  on 3LNC    Atrial fibrillation    Hyperlipemia    Chronic sinusitis    Raynaud phenomenon    HTN (hypertension)    DM (diabetes mellitus)    Claustrophobia    COPD (chronic obstructive pulmonary disease)    History of tonsillectomy    S/P tonsillectomy         FAMILY HISTORY:  FH: myocardial infarction    Family history of CABG    FH: MI (myocardial infarction)    FH: CABG (coronary artery bypass surgery)    NC    Social History:Non smoker    MEDICATIONS  (STANDING):  apixaban 5 milliGRAM(s) Oral every 12 hours  ascorbic acid 500 milliGRAM(s) Oral daily  aspirin  chewable 81 milliGRAM(s) Oral daily  atorvastatin 80 milliGRAM(s) Oral at bedtime  azithromycin  IVPB 500 milliGRAM(s) IV Intermittent every 24 hours  budesonide 160 MICROgram(s)/formoterol 4.5 MICROgram(s) Inhaler 2 Puff(s) Inhalation two times a day  cefepime   IVPB 2000 milliGRAM(s) IV Intermittent every 12 hours  cholecalciferol 1000 Unit(s) Oral daily  dextrose 5%. 1000 milliLiter(s) (50 mL/Hr) IV Continuous <Continuous>  dextrose 50% Injectable 12.5 Gram(s) IV Push once  dextrose 50% Injectable 25 Gram(s) IV Push once  dextrose 50% Injectable 25 Gram(s) IV Push once  ferrous    sulfate 325 milliGRAM(s) Oral daily  insulin glargine Injectable (LANTUS) 10 Unit(s) SubCutaneous at bedtime  insulin lispro (HumaLOG) corrective regimen sliding scale   SubCutaneous three times a day before meals  insulin lispro Injectable (HumaLOG) 3 Unit(s) SubCutaneous three times a day before meals  montelukast 10 milliGRAM(s) Oral at bedtime  multivitamin 1 Tablet(s) Oral daily  pantoprazole    Tablet 40 milliGRAM(s) Oral before breakfast  polyethylene glycol 3350 17 Gram(s) Oral daily  potassium chloride    Tablet ER 40 milliEquivalent(s) Oral every 4 hours  senna 2 Tablet(s) Oral at bedtime  tiotropium 18 MICROgram(s) Capsule 1 Capsule(s) Inhalation daily    MEDICATIONS  (PRN):  acetaminophen   Tablet .. 650 milliGRAM(s) Oral every 6 hours PRN Mild Pain (1 - 3)  bisacodyl Suppository 10 milliGRAM(s) Rectal daily PRN Constipation  dextrose 40% Gel 15 Gram(s) Oral once PRN Blood Glucose LESS THAN 70 milliGRAM(s)/deciliter  glucagon  Injectable 1 milliGRAM(s) IntraMuscular once PRN Glucose LESS THAN 70 milligrams/deciliter  guaiFENesin   Syrup  (Sugar-Free) 100 milliGRAM(s) Oral every 6 hours PRN Cough  lactulose Syrup 15 Gram(s) Oral two times a day PRN constipation  levalbuterol Inhalation 0.63 milliGRAM(s) Inhalation every 4 hours PRN shortness of breath and/or wheezing  oxyCODONE    IR 5 milliGRAM(s) Oral every 6 hours PRN Moderate Pain (4 - 6)   Meds reviewed    Allergies    No Known Allergies    Intolerances    albuterol (Unknown)       REVIEW OF SYSTEMS:    CONSTITUTIONAL:  No weight loss   EYES: No eye pain, visual disturbances, or discharge  ENMT:  No difficulty hearing, tinnitus, vertigo; No sinus or throat pain  NECK: No pain or stiffness  BREASTS: No pain, masses, or nipple discharge  RESPIRATORY: +SOB. +NIELSON  CARDIOVASCULAR: No chest pain, palpitations, dizziness,   GASTROINTESTINAL: No abdominal or epigastric pain. No nausea, vomiting, or hematemesis;  GENITOURINARY: No dysuria, frequency, hematuria, or incontinence  NEUROLOGICAL: No headaches, memory loss, loss of strength, numbness, or tremors  SKIN: no Diffuse erythema, no blisters  LYMPH NODES: No enlarged glands  ENDOCRINE: No heat or cold intolerance; No hair loss  MUSCULOSKELETAL: No joint pain or swelling   PSYCHIATRIC: No depression, anxiety, mood swings, or difficulty sleeping  ALLERGY AND IMMUNOLOGIC: No hives or eczema    ICU Vital Signs Last 24 Hrs  T(C): 36.9 (22 Oct 2020 12:44), Max: 36.9 (22 Oct 2020 12:44)  T(F): 98.4 (22 Oct 2020 12:44), Max: 98.4 (22 Oct 2020 12:44)  HR: 74 (22 Oct 2020 13:00) (65 - 95)  BP: 122/60 (22 Oct 2020 12:44) (122/60 - 175/75)  BP(mean): --  ABP: --  ABP(mean): --  RR: 20 (22 Oct 2020 12:44) (18 - 20)  SpO2: 98% (22 Oct 2020 13:00) (93% - 100%)    PHYSICAL EXAM:    GENERAL: No distress  HEAD:  Atraumatic, Normocephalic  EYES: EOMI, conjunctiva and sclera clear  ENMT: No drainage from nares, no drainage from pinna  NECK: Supple, neck  veins full  NERVOUS SYSTEM:  Alert & Oriented X3, Good concentration; Motor Strength wnl upper and lower extremities  CHEST/LUNG: Decreased BS,no rales, no rhonchi, Mild wheezing  HEART: Regular rate and rhythm; No murmurs, rubs, or gallops  ABDOMEN: Soft, Nontender, Nondistended; Bowel sounds present,  EXTREMITIES: trace Edema  SKIN: No rashes or lesions, pale      LABS:                                            11.2   10.53 )-----------( 278      ( 22 Oct 2020 09:15 )             36.8     10-22    140  |  94<L>  |  31<H>  ----------------------------<  125<H>  3.9   |  42<H>  |  1.10    Ca    10.2<H>      22 Oct 2020 09:15    TPro  7.3  /  Alb  2.9<L>  /  TBili  0.6  /  DBili  x   /  AST  15  /  ALT  22  /  AlkPhos  69  10-22

## 2020-10-22 NOTE — PROGRESS NOTE ADULT - PROBLEM SELECTOR PLAN 7
Patient in NSR  - on eliquis 5mg bid and cardizem 240mg qd (home meds)  - hold theophylline and monitor on remote tele, apprec pharmD Dr Ackerman collaboration in therapeutic management and discussion/education with patient  - D/w Cardio (Dr. Whitley) - Cardizem 240 mg qd  -Cardio rec (Dr. Richardson): consider restarting Duoneb; continue eliquis and cardiazem; PT for deconditioning BP stable  -on cardizem at rehab, continue  -monitor routine hemodynamics

## 2020-10-22 NOTE — PROGRESS NOTE ADULT - SUBJECTIVE AND OBJECTIVE BOX
PULMONARY/CRITICAL CARE      INTERVAL HPI/OVERNIGHT EVENTS:  Less sob.   Improved. Tried to ambulate with walker.  Sleeps with NIV.    60y FemaleHPI:  60F w/ end stage COPD on 3LNC (pending transplant list at [facility]), former smoker, CAD s/p stent (2016), afib on eliquis, uncontrolled DM2 (on insulin), raynaud phenomenon and recent hospitalization at [facility] for confusion and [facility] Patient states that she has had worsening shortness of breath for past two days. Unable to obtain comprehensive history due to dyspnea. Patient denies fever, chills, sore throat, cough, chest pain, palpitations, abd pain, n/v. Currently feels short of breath even after receiving duonebs and steroids in the ED. Unable to keep mask on during interview and lay back in stretcher due to subjective sob. Patient repeatedly stating that it is too hot in her room and fanning herself with a paper. Per chart, Dr. Mathew (PCP) has chart notes regarding possible hallucinations. Would like her home medications now but otherwise has no acute complaints.    In the ED, VS T97.7F  /78 RR 21 SpO2 94% on 4LNC. Labs significant for mild leukocytosis of 11.41, H/H 9.9/30.5, thrombophilia of 435. CO2 35, albumin 2.4.   CXR done showing b/l patchy infiltrates, official read pending  CT chest done showing multifocal PNA and reactive mediastinal lymphadenopathy  EKG read limited by artifact, sinus tachycardia with RBBB and premature supraventricular complexes (06 Oct 2020 04:55)        PAST MEDICAL & SURGICAL HISTORY:  Ascorbic acid deficiency    Iron deficiency anemia    Constipation    GERD without esophagitis    Type 2 diabetes mellitus    HTN (hypertension)    Heart failure    End stage COPD  on 3LNC    Atrial fibrillation    Hyperlipemia    Chronic sinusitis    Raynaud phenomenon    Claustrophobia    COPD (chronic obstructive pulmonary disease)    S/P tonsillectomy          ICU Vital Signs Last 24 Hrs  T(C): 36.7 (12 Oct 2020 05:11), Max: 36.8 (11 Oct 2020 13:06)  T(F): 98 (12 Oct 2020 05:11), Max: 98.3 (11 Oct 2020 13:06)  HR: 70 (12 Oct 2020 08:09) (70 - 90)  BP: 138/78 (12 Oct 2020 05:11) (121/73 - 138/78)  BP(mean): --  ABP: --  ABP(mean): --  RR: 17 (12 Oct 2020 05:11) (17 - 22)  SpO2: 99% (12 Oct 2020 08:09) (94% - 100%)    Qtts:     I&O's Summary    11 Oct 2020 07:01  -  12 Oct 2020 07:00  --------------------------------------------------------  IN: 50 mL / OUT: 800 mL / NET: -750 mL              PHYSICAL EXAM:    GENERAL: NAD, well-groomed, well-developed, NAD  HEAD:  Atraumatic, Normocephalic  EYES: EOMI, PERRLA, conjunctiva and sclera clear  ENMT: No tonsillar erythema, exudates, or enlargement; Moist mucous membranes, Good dentition, No lesions  NECK: Supple, No JVD, Normal thyroid  NERVOUS SYSTEM:  Alert & Oriented X3, Good concentration; Motor Strength 5/5 B/L upper and lower extremities  CHEST/LUNG: Clear to percussion bilaterally; No rales, rhonchi, wheezing, or rubs Decreased bs  HEART: Regular rate and rhythm; No murmurs, rubs, or gallops  ABDOMEN: Soft, Nontender, Nondistended; Bowel sounds present  EXTREMITIES:   No clubbing, cyanosis, trace pedal edema legs wrapped  LYMPH: No lymphadenopathy noted  SKIN: No rashes or lesions        LABS:                        8.9    10.88 )-----------( 483      ( 11 Oct 2020 07:04 )             30.4     10-11    139  |  99  |  36<H>  ----------------------------<  317<H>  4.4   |  34<H>  |  0.94    Ca    9.2      11 Oct 2020 07:04    TPro  6.3  /  Alb  2.4<L>  /  TBili  0.2  /  DBili  x   /  AST  12<L>  /  ALT  16  /  AlkPhos  70  10-11          vanco through     RADIOLOGY & ADDITIONAL STUDIES:      CRITICAL CARE TIME SPENT:

## 2020-10-22 NOTE — PROGRESS NOTE ADULT - SUBJECTIVE AND OBJECTIVE BOX
[facility] -- Adriana Gonzales, Gina, Funmilayo, Dylan Whitley Savella  Office # [phone]      Follow Up:    SOB  Subjective/Observations:   No events overnight resting comfortably in bed.  No complaints of chest pain, dyspnea, or palpitations reported. No signs of orthopnea or PND. Wearing nasal cannula     REVIEW OF SYSTEMS: All other review of systems is negative unless indicated above    PAST MEDICAL & SURGICAL HISTORY:  Ascorbic acid deficiency    Iron deficiency anemia    Constipation    GERD without esophagitis    Type 2 diabetes mellitus    HTN (hypertension)    Heart failure    End stage COPD  on 3LNC    Atrial fibrillation    Hyperlipemia    Chronic sinusitis    Raynaud phenomenon    Claustrophobia    COPD (chronic obstructive pulmonary disease)    S/P tonsillectomy        MEDICATIONS  (STANDING):  apixaban 5 milliGRAM(s) Oral every 12 hours  aspirin  chewable 81 milliGRAM(s) Oral daily  atorvastatin 80 milliGRAM(s) Oral at bedtime  azithromycin   Tablet 500 milliGRAM(s) Oral daily  Biotene Dry Mouth Oral Rinse 5 milliLiter(s) Swish and Spit two times a day  budesonide 160 MICROgram(s)/formoterol 4.5 MICROgram(s) Inhaler 2 Puff(s) Inhalation two times a day  buMETAnide 1 milliGRAM(s) Oral two times a day  cholecalciferol 1000 Unit(s) Oral daily  dextrose 5%. 1000 milliLiter(s) (50 mL/Hr) IV Continuous <Continuous>  dextrose 50% Injectable 12.5 Gram(s) IV Push once  dextrose 50% Injectable 25 Gram(s) IV Push once  dextrose 50% Injectable 25 Gram(s) IV Push once  diltiazem    milliGRAM(s) Oral daily  ferrous    sulfate 325 milliGRAM(s) Oral daily  guaiFENesin  milliGRAM(s) Oral every 12 hours  insulin glargine Injectable (LANTUS) 12 Unit(s) SubCutaneous at bedtime  insulin lispro (ADMELOG) corrective regimen sliding scale   SubCutaneous at bedtime  insulin lispro (ADMELOG) corrective regimen sliding scale.   SubCutaneous three times a day before meals  insulin lispro Injectable (ADMELOG) 4 Unit(s) SubCutaneous three times a day before meals  ipratropium    for Nebulization 500 MICROGram(s) Nebulizer every 6 hours  montelukast 10 milliGRAM(s) Oral at bedtime  multivitamin 1 Tablet(s) Oral daily  pantoprazole    Tablet 40 milliGRAM(s) Oral before breakfast  polyethylene glycol 3350 17 Gram(s) Oral daily  predniSONE   Tablet 20 milliGRAM(s) Oral daily  senna 2 Tablet(s) Oral at bedtime  tiotropium 18 MICROgram(s) Capsule 1 Capsule(s) Inhalation daily    MEDICATIONS  (PRN):  acetaminophen   Tablet .. 650 milliGRAM(s) Oral every 6 hours PRN Mild Pain (1 - 3)  ALPRAZolam 0.25 milliGRAM(s) Oral every 8 hours PRN anxiety  bisacodyl Suppository 10 milliGRAM(s) Rectal daily PRN Constipation  dextrose 40% Gel 15 Gram(s) Oral once PRN Blood Glucose LESS THAN 70 milliGRAM(s)/deciliter  glucagon  Injectable 1 milliGRAM(s) IntraMuscular once PRN Glucose LESS THAN 70 milligrams/deciliter  lactulose Syrup 15 Gram(s) Oral two times a day PRN constipation      Allergies    No Known Allergies    Intolerances    albuterol (Unknown)      Vital Signs Last 24 Hrs  T(C): 36.6 (22 Oct 2020 05:00), Max: 37.1 (21 Oct 2020 13:13)  T(F): 97.8 (22 Oct 2020 05:00), Max: 98.7 (21 Oct 2020 13:13)  HR: 79 (22 Oct 2020 07:40) (65 - 95)  BP: 145/69 (22 Oct 2020 05:00) (145/69 - 175/75)  BP(mean): --  RR: 19 (22 Oct 2020 05:00) (18 - 19)  SpO2: 100% (22 Oct 2020 07:40) (93% - 100%)    I&O's Summary    21 Oct 2020 07:01  -  22 Oct 2020 07:00  --------------------------------------------------------  IN: 0 mL / OUT: 1450 mL / NET: -1450 mL          PHYSICAL EXAM:  TELE: Off tele   Constitutional: NAD, awake and alert, well-developed  HEENT: Moist Mucous Membranes, Anicteric  Pulmonary: Non-labored, breath sounds with Bilaterally diminished at bases  No  wheezes, crackles or rhonchi   Cardiovascular: Regular, S1 and S2 nl, No murmurs, rubs, gallops or clicks  Gastrointestinal: Bowel Sounds present, soft, nontender.   Lymph: No lymphadenopathy. No peripheral edema.  Skin: No visible rashes or ulcers.  Psych:  Mood & affect appropriate    LABS: All Labs Reviewed:                        11.2   10.53 )-----------( 278      ( 22 Oct 2020 09:15 )             36.8                         9.6    9.68  )-----------( 269      ( 21 Oct 2020 08:53 )             31.9                         8.9    11.95 )-----------( 290      ( 20 Oct 2020 08:26 )             30.3     22 Oct 2020 09:15    140    |  94     |  31     ----------------------------<  125    3.9     |  42     |  1.10   21 Oct 2020 08:53    141    |  97     |  36     ----------------------------<  145    3.9     |  42     |  0.98   20 Oct 2020 08:26    140    |  97     |  34     ----------------------------<  154    4.1     |  41     |  0.90     Ca    10.2       22 Oct 2020 09:15  Ca    9.7        21 Oct 2020 08:53  Ca    9.6        20 Oct 2020 08:26    TPro  7.3    /  Alb  2.9    /  TBili  0.6    /  DBili  x      /  AST  15     /  ALT  22     /  AlkPhos  69     22 Oct 2020 09:15  TPro  6.0    /  Alb  2.5    /  TBili  0.3    /  DBili  x      /  AST  14     /  ALT  21     /  AlkPhos  61     21 Oct 2020 08:53  TPro  6.5    /  Alb  2.6    /  TBili  0.5    /  DBili  x      /  AST  14     /  ALT  19     /  AlkPhos  63     20 Oct 2020 08:26      12 Lead ECG:   Ventricular Rate 109 BPM  Atrial Rate 109 BPM  P-R Interval 150 ms  QRS Duration 136 ms  Q-T Interval 390 ms  QTC Calculation(Bazett) 525 ms  P Axis 56 degrees  R Axis -16 degrees  T Axis 57 degrees  Diagnosis Line Sinus tachycardia with premature supraventricular complexes  Right bundle branch block  Abnormal ECG  Confirmed by LUIS ENRIQUE WHITLEY ([id number]) on 10/8/2020 11:41:04 AM (10-08-20 @ 08:51)    < from: TTE Echo Complete w/o Contrast w/ Doppler (10.06.20 @ 17:10) >  Dimensions:  LA 3.7       Normal Values: 2.0 - 4.0 cm  Ao 2.7        Normal Values: 2.0 - 3.8 cm  SEPTUM 1.4       Normal Values: 0.6 - 1.2 cm  PWT 1.3       Normal Values: 0.6 - 1.1 cm  LVIDd 3.9         Normal Values: 3.0 - 5.6 cm  LVIDs 2.8         Normal Values: 1.8 - 4.0 cm    OBSERVATIONS:  Technically difficult and limited study  Mitral Valve: Thickened leaflets, mild MR.  Aortic Valve/Aorta: Aortic valve is not well-visualized, grossly normal function without severe pathology  Tricuspid Valve: normal with trace TR.  Pulmonic Valve: Not well-visualized  Left Atrium: normal  Right Atrium: Not well-visualized  Left Ventricle: normal LV size and systolic function, estimated LVEF of 55%. Mild LVH. Endocardium is not well-visualized, cannot rule out segmental dysfunction  Right Ventricle: Grossly normal size and systolic function.  Pericardium/Pleura: normal, no significant pericardial effusion.  Pulmonary/RV Pressure: estimated PA systolic pressure of 15.7 mmHg assuming an RA pressure of 3 mmHg.  LV diastolic dysfunction is present    Conclusion:  Technically difficult and limited study  Mild concentric LVH with grossly normal left ventricular systolic function, estimated LVEF of 55%.  Grossly normal RV size and systolic function.  Aortic valve is not well-visualized, grossly normal function without severe pathology  Mild MR  Trace TR.  No significant pericardial effusion.    < end of copied text >    < from: Cardiac Cath Lab - Adult (03.24.17 @ 11:16) >  VENTRICLES: Global left ventricular function was normal. EF estimated was  65 %.  CORONARY VESSELS: The coronary circulation is right dominant.  LM:   --  Mid left main: There was a 30 % stenosis.  LAD:   --  Ostial LAD: There was a 40 % stenosis.  CX:   --  Circumflex: Normal.  RCA:   --  RCA: Normal.  COMPLICATIONS: There were no complications.  DIAGNOSTIC RECOMMENDATIONS: The patient should continue with the present  medications.  < end of copied text >

## 2020-10-22 NOTE — PROGRESS NOTE ADULT - PROBLEM SELECTOR PLAN 3
recent TTE in 08/2020 showing normal LVEF, stage 1 diastolic dysfunction  - TTE (10/6/20) LVEF of 55%   - Patient w/ continued LE edema, mild improvement  - Continue home dose Bumex (1mg PO BID)  - caution with excessive IVF Patient found to have IgG-Lambda monoclonal protein with SPEP with faint M spike of 0.1g/dL   - heme/onc Dr. jaramillo's consulted--recommend repeat MGUS studies outpatient

## 2020-10-22 NOTE — PROGRESS NOTE ADULT - ASSESSMENT
AURORA on CKD 2  Hyponatremia  Hypokalemia  COPD  Hypercalcemia  Metabolic Alkalosis     -BLCr 1  -AURORA unclear etiology, clinically ATN  -UA - 3-5 WBC, trace ketones, 30 proteins  -FeUrea 34.6%  -UPC 0.44  -Ur osm 454  -Corrected calcium 10.2, with paraprotein gap and anemia; FLC ratio is normal; SPEP with gamma migrating protein seen  -Iron repletion   -Metformin on hold  -Renal indices are stable at baseline with some fluctuation; Continue with Bumex as ordered;  -Less likely AIN given paucity of WBC on UA and no peripheral eosinophils  -Strict I&Os  -Pulm follow up noted  -Heme/onc note reviewed.  Possible MGUS, repeat studies as outpatient  -Alkalosis likely 2/2 compensation for respiratory acidosis; recommend to repeat ABG if the chemistries show worsening bicarb levels; may need Diamox. Pulm follow up    Thank you

## 2020-10-22 NOTE — PROGRESS NOTE ADULT - PROBLEM SELECTOR PLAN 2
on 3LNC at rehab, currently requiring 5L NC with BIPAP, wean as tolerated  - continue spiriva, symbicort, montelukast, inhaler PRN, atrovent   -theophylline held given tachycardia and brief afib  - BIPAP qhs and PRN -- pt counseled to use more frequently   -d/w patient that she is likely not a candidate for transplant. Patient understands  -Seen by palliative care yesterday--not ready to discuss hospice care, fentanyl patch DC, declined SSRI

## 2020-10-22 NOTE — PROGRESS NOTE ADULT - ASSESSMENT
60F w/ end stage COPD on 3LNC (trying to get on  transplant list at [facility]), former smoker, CAD s/p stent (2016), afib on eliquis, DM2 (on insulin), raynaud phenomenon and recent hospitalization at [facility] for confusion and AURORA p/w sob, admitted for acute on chronic resp failure with hypoxia and hyercarbia sec to multifocal PNA and COPD exacerabtion with end stage COPD.

## 2020-10-22 NOTE — PROGRESS NOTE ADULT - PROBLEM SELECTOR PLAN 5
on metformin and glargine 12 U qhs, hold metformin while in hospital  - glargine 10units qhs  - continue humalog 3 units TID premeal   - moderate dose ISS  - accuchecks, hypoglycemia protocol  - Hba1c: 6.0 Resolved  - likely 2/2 to prerenal vs hypoxemic  - trend renal indices  SPEP with gamma migrating protein seen -- HemeOnc consulted to r/o Multiple myeloma, MGUS; f/u recs

## 2020-10-22 NOTE — PROGRESS NOTE ADULT - SUBJECTIVE AND OBJECTIVE BOX
Patient is a 60y old  Female who presents with a chief complaint of COPD exacerbation, PNA (22 Oct 2020 07:51)      INTERVAL HPI/OVERNIGHT EVENTS: Patient seen and examined at bedside. No events overnight. Tolerated BIPAP well. States her breathing is the same as yesterday and notes improvement in her leg edema. Denies any HA, CP, abdominal pain, n/v    MEDICATIONS  (STANDING):  apixaban 5 milliGRAM(s) Oral every 12 hours  aspirin  chewable 81 milliGRAM(s) Oral daily  atorvastatin 80 milliGRAM(s) Oral at bedtime  azithromycin   Tablet 500 milliGRAM(s) Oral daily  Biotene Dry Mouth Oral Rinse 5 milliLiter(s) Swish and Spit two times a day  budesonide 160 MICROgram(s)/formoterol 4.5 MICROgram(s) Inhaler 2 Puff(s) Inhalation two times a day  buMETAnide 1 milliGRAM(s) Oral two times a day  cholecalciferol 1000 Unit(s) Oral daily  dextrose 5%. 1000 milliLiter(s) (50 mL/Hr) IV Continuous <Continuous>  dextrose 50% Injectable 12.5 Gram(s) IV Push once  dextrose 50% Injectable 25 Gram(s) IV Push once  dextrose 50% Injectable 25 Gram(s) IV Push once  diltiazem    milliGRAM(s) Oral daily  ferrous    sulfate 325 milliGRAM(s) Oral daily  guaiFENesin  milliGRAM(s) Oral every 12 hours  insulin glargine Injectable (LANTUS) 12 Unit(s) SubCutaneous at bedtime  insulin lispro (ADMELOG) corrective regimen sliding scale   SubCutaneous at bedtime  insulin lispro (ADMELOG) corrective regimen sliding scale.   SubCutaneous three times a day before meals  insulin lispro Injectable (ADMELOG) 4 Unit(s) SubCutaneous three times a day before meals  ipratropium    for Nebulization 500 MICROGram(s) Nebulizer every 6 hours  montelukast 10 milliGRAM(s) Oral at bedtime  multivitamin 1 Tablet(s) Oral daily  pantoprazole    Tablet 40 milliGRAM(s) Oral before breakfast  polyethylene glycol 3350 17 Gram(s) Oral daily  predniSONE   Tablet 20 milliGRAM(s) Oral daily  senna 2 Tablet(s) Oral at bedtime  tiotropium 18 MICROgram(s) Capsule 1 Capsule(s) Inhalation daily    MEDICATIONS  (PRN):  acetaminophen   Tablet .. 650 milliGRAM(s) Oral every 6 hours PRN Mild Pain (1 - 3)  ALPRAZolam 0.25 milliGRAM(s) Oral every 8 hours PRN anxiety  bisacodyl Suppository 10 milliGRAM(s) Rectal daily PRN Constipation  dextrose 40% Gel 15 Gram(s) Oral once PRN Blood Glucose LESS THAN 70 milliGRAM(s)/deciliter  glucagon  Injectable 1 milliGRAM(s) IntraMuscular once PRN Glucose LESS THAN 70 milligrams/deciliter  lactulose Syrup 15 Gram(s) Oral two times a day PRN constipation      Allergies    No Known Allergies    Intolerances    albuterol (Unknown)      REVIEW OF SYSTEMS:  CONSTITUTIONAL: No fever or chills  HEENT:  No headache, no sore throat  RESPIRATORY: No cough, wheezing. Positive for shortness of breath  CARDIOVASCULAR: No chest pain, palpitations. Positive for edema  GASTROINTESTINAL: No abd pain, nausea, vomiting, or diarrhea  GENITOURINARY: No dysuria, frequency, or hematuria  NEUROLOGICAL: no focal weakness or dizziness  MUSCULOSKELETAL: no myalgias     Vital Signs Last 24 Hrs  T(C): 36.6 (22 Oct 2020 05:00), Max: 37.1 (21 Oct 2020 13:13)  T(F): 97.8 (22 Oct 2020 05:00), Max: 98.7 (21 Oct 2020 13:13)  HR: 79 (22 Oct 2020 07:40) (65 - 95)  BP: 145/69 (22 Oct 2020 05:00) (145/69 - 175/75)  BP(mean): --  RR: 19 (22 Oct 2020 05:00) (18 - 19)  SpO2: 100% (22 Oct 2020 07:40) (93% - 100%)    PHYSICAL EXAM:  GENERAL: NAD, laying in bed comfortably, on NC  HEENT:  anicteric, moist mucous membranes  CHEST/LUNG:  Decreased BS b/l. No rales, wheezes, or rhonchi  HEART:  RRR, S1, S2  ABDOMEN:  BS+, soft, nontender, nondistended  EXTREMITIES: 1+ pitting edema of RLE. No cyanosis, or calf tenderness  NERVOUS SYSTEM: answers questions and follows commands appropriately    LABS:                        9.6    9.68  )-----------( 269      ( 21 Oct 2020 08:53 )             31.9     CBC Full  -  ( 21 Oct 2020 08:53 )  WBC Count : 9.68 K/uL  Hemoglobin : 9.6 g/dL  Hematocrit : 31.9 %  Platelet Count - Automated : 269 K/uL  Mean Cell Volume : 82.0 fl  Mean Cell Hemoglobin : 24.7 pg  Mean Cell Hemoglobin Concentration : 30.1 gm/dL  Auto Neutrophil # : 6.91 K/uL  Auto Lymphocyte # : 1.41 K/uL  Auto Monocyte # : 0.95 K/uL  Auto Eosinophil # : 0.23 K/uL  Auto Basophil # : 0.03 K/uL  Auto Neutrophil % : 71.4 %  Auto Lymphocyte % : 14.6 %  Auto Monocyte % : 9.8 %  Auto Eosinophil % : 2.4 %  Auto Basophil % : 0.3 %    21 Oct 2020 08:53    141    |  97     |  36     ----------------------------<  145    3.9     |  42     |  0.98     Ca    9.7        21 Oct 2020 08:53    TPro  6.0    /  Alb  2.5    /  TBili  0.3    /  DBili  x      /  AST  14     /  ALT  21     /  AlkPhos  61     21 Oct 2020 08:53        CAPILLARY BLOOD GLUCOSE      POCT Blood Glucose.: 137 mg/dL (21 Oct 2020 21:18)  POCT Blood Glucose.: 181 mg/dL (21 Oct 2020 17:11)  POCT Blood Glucose.: 206 mg/dL (21 Oct 2020 12:15)          RADIOLOGY & ADDITIONAL TESTS:    Personally reviewed.     Consultant(s) Notes Reviewed:  [x] YES  [ ] NO

## 2020-10-22 NOTE — PROGRESS NOTE ADULT - PROBLEM SELECTOR PLAN 4
Resolved  - likely 2/2 to prerenal vs hypoxemic  - trend renal indices  SPEP with gamma migrating protein seen -- HemeOnc consulted to r/o Multiple myeloma, MGUS; f/u recs recent TTE in 08/2020 showing normal LVEF, stage 1 diastolic dysfunction  - TTE (10/6/20) LVEF of 55%   - Patient w/ continued LE edema, mild improvement  - Continue home dose Bumex (1mg PO BID)  - caution with excessive IVF

## 2020-10-23 LAB
ANION GAP SERPL CALC-SCNC: 3 MMOL/L — LOW (ref 5–17)
BASE EXCESS BLDA CALC-SCNC: 14.5 MMOL/L — HIGH (ref -2–2)
BLOOD GAS COMMENTS ARTERIAL: SIGNIFICANT CHANGE UP
BUN SERPL-MCNC: 35 MG/DL — HIGH (ref 7–23)
CALCIUM SERPL-MCNC: 9 MG/DL — SIGNIFICANT CHANGE UP (ref 8.5–10.1)
CHLORIDE SERPL-SCNC: 95 MMOL/L — LOW (ref 96–108)
CO2 SERPL-SCNC: 41 MMOL/L — HIGH (ref 22–31)
CREAT SERPL-MCNC: 1.2 MG/DL — SIGNIFICANT CHANGE UP (ref 0.5–1.3)
GLUCOSE SERPL-MCNC: 195 MG/DL — HIGH (ref 70–99)
HCO3 BLDA-SCNC: 37 MMOL/L — HIGH (ref 23–27)
HCT VFR BLD CALC: 33.3 % — LOW (ref 34.5–45)
HGB BLD-MCNC: 9.9 G/DL — LOW (ref 11.5–15.5)
HOROWITZ INDEX BLDA+IHG-RTO: 36 — SIGNIFICANT CHANGE UP
MCHC RBC-ENTMCNC: 24.6 PG — LOW (ref 27–34)
MCHC RBC-ENTMCNC: 29.7 GM/DL — LOW (ref 32–36)
MCV RBC AUTO: 82.8 FL — SIGNIFICANT CHANGE UP (ref 80–100)
NRBC # BLD: 0 /100 WBCS — SIGNIFICANT CHANGE UP (ref 0–0)
PCO2 BLDA: 62 MMHG — HIGH (ref 32–46)
PH BLDA: 7.42 — SIGNIFICANT CHANGE UP (ref 7.35–7.45)
PLATELET # BLD AUTO: 233 K/UL — SIGNIFICANT CHANGE UP (ref 150–400)
PO2 BLDA: 82 MMHG — SIGNIFICANT CHANGE UP (ref 74–108)
POTASSIUM SERPL-MCNC: 3.7 MMOL/L — SIGNIFICANT CHANGE UP (ref 3.5–5.3)
POTASSIUM SERPL-SCNC: 3.7 MMOL/L — SIGNIFICANT CHANGE UP (ref 3.5–5.3)
RBC # BLD: 4.02 M/UL — SIGNIFICANT CHANGE UP (ref 3.8–5.2)
RBC # FLD: 18.2 % — HIGH (ref 10.3–14.5)
SAO2 % BLDA: 95 % — SIGNIFICANT CHANGE UP (ref 92–96)
SODIUM SERPL-SCNC: 139 MMOL/L — SIGNIFICANT CHANGE UP (ref 135–145)
WBC # BLD: 10.68 K/UL — HIGH (ref 3.8–10.5)
WBC # FLD AUTO: 10.68 K/UL — HIGH (ref 3.8–10.5)

## 2020-10-23 PROCEDURE — 99232 SBSQ HOSP IP/OBS MODERATE 35: CPT

## 2020-10-23 PROCEDURE — 99233 SBSQ HOSP IP/OBS HIGH 50: CPT | Mod: GC

## 2020-10-23 RX ORDER — IPRATROPIUM/ALBUTEROL SULFATE 18-103MCG
1.5 AEROSOL WITH ADAPTER (GRAM) INHALATION ONCE
Refills: 0 | Status: COMPLETED | OUTPATIENT
Start: 2020-10-23 | End: 2020-10-23

## 2020-10-23 RX ORDER — IPRATROPIUM/ALBUTEROL SULFATE 18-103MCG
1.5 AEROSOL WITH ADAPTER (GRAM) INHALATION EVERY 6 HOURS
Refills: 0 | Status: DISCONTINUED | OUTPATIENT
Start: 2020-10-23 | End: 2020-10-23

## 2020-10-23 RX ORDER — IPRATROPIUM/ALBUTEROL SULFATE 18-103MCG
1.5 AEROSOL WITH ADAPTER (GRAM) INHALATION EVERY 4 HOURS
Refills: 0 | Status: DISCONTINUED | OUTPATIENT
Start: 2020-10-23 | End: 2020-10-24

## 2020-10-23 RX ADMIN — Medication 1000 UNIT(S): at 11:50

## 2020-10-23 RX ADMIN — Medication 4 UNIT(S): at 12:36

## 2020-10-23 RX ADMIN — Medication 600 MILLIGRAM(S): at 05:57

## 2020-10-23 RX ADMIN — Medication 81 MILLIGRAM(S): at 11:49

## 2020-10-23 RX ADMIN — Medication 1.5 MILLILITER(S): at 03:29

## 2020-10-23 RX ADMIN — APIXABAN 5 MILLIGRAM(S): 2.5 TABLET, FILM COATED ORAL at 05:58

## 2020-10-23 RX ADMIN — ATORVASTATIN CALCIUM 80 MILLIGRAM(S): 80 TABLET, FILM COATED ORAL at 21:52

## 2020-10-23 RX ADMIN — PANTOPRAZOLE SODIUM 40 MILLIGRAM(S): 20 TABLET, DELAYED RELEASE ORAL at 05:58

## 2020-10-23 RX ADMIN — INSULIN GLARGINE 12 UNIT(S): 100 INJECTION, SOLUTION SUBCUTANEOUS at 21:51

## 2020-10-23 RX ADMIN — Medication 4 UNIT(S): at 17:36

## 2020-10-23 RX ADMIN — BUDESONIDE AND FORMOTEROL FUMARATE DIHYDRATE 2 PUFF(S): 160; 4.5 AEROSOL RESPIRATORY (INHALATION) at 05:58

## 2020-10-23 RX ADMIN — Medication 1.5 MILLILITER(S): at 15:40

## 2020-10-23 RX ADMIN — Medication 20 MILLIGRAM(S): at 05:57

## 2020-10-23 RX ADMIN — Medication 600 MILLIGRAM(S): at 17:35

## 2020-10-23 RX ADMIN — AZITHROMYCIN 500 MILLIGRAM(S): 500 TABLET, FILM COATED ORAL at 11:48

## 2020-10-23 RX ADMIN — Medication 240 MILLIGRAM(S): at 05:58

## 2020-10-23 RX ADMIN — APIXABAN 5 MILLIGRAM(S): 2.5 TABLET, FILM COATED ORAL at 17:35

## 2020-10-23 RX ADMIN — BUDESONIDE AND FORMOTEROL FUMARATE DIHYDRATE 2 PUFF(S): 160; 4.5 AEROSOL RESPIRATORY (INHALATION) at 19:17

## 2020-10-23 RX ADMIN — Medication 1 TABLET(S): at 11:49

## 2020-10-23 RX ADMIN — MONTELUKAST 10 MILLIGRAM(S): 4 TABLET, CHEWABLE ORAL at 21:52

## 2020-10-23 RX ADMIN — BUMETANIDE 1 MILLIGRAM(S): 0.25 INJECTION INTRAMUSCULAR; INTRAVENOUS at 05:58

## 2020-10-23 RX ADMIN — Medication 1.5 MILLILITER(S): at 21:54

## 2020-10-23 RX ADMIN — Medication 6: at 12:35

## 2020-10-23 RX ADMIN — Medication 2: at 17:35

## 2020-10-23 RX ADMIN — SENNA PLUS 2 TABLET(S): 8.6 TABLET ORAL at 21:52

## 2020-10-23 RX ADMIN — TIOTROPIUM BROMIDE 1 CAPSULE(S): 18 CAPSULE ORAL; RESPIRATORY (INHALATION) at 21:51

## 2020-10-23 RX ADMIN — Medication 325 MILLIGRAM(S): at 11:49

## 2020-10-23 NOTE — PROGRESS NOTE ADULT - SUBJECTIVE AND OBJECTIVE BOX
Patient is a 60y old  Female who presents with a chief complaint of COPD exacerbation, PNA (23 Oct 2020 09:55)      INTERVAL HPI/OVERNIGHT EVENTS: Patient seen and examined at bedside. No events overnight. Tolerating BIPAP well. Denies any HA, CP, abdominal pain, n/v.    MEDICATIONS  (STANDING):  apixaban 5 milliGRAM(s) Oral every 12 hours  aspirin  chewable 81 milliGRAM(s) Oral daily  atorvastatin 80 milliGRAM(s) Oral at bedtime  azithromycin   Tablet 500 milliGRAM(s) Oral daily  Biotene Dry Mouth Oral Rinse 5 milliLiter(s) Swish and Spit two times a day  budesonide 160 MICROgram(s)/formoterol 4.5 MICROgram(s) Inhaler 2 Puff(s) Inhalation two times a day  cholecalciferol 1000 Unit(s) Oral daily  dextrose 5%. 1000 milliLiter(s) (50 mL/Hr) IV Continuous <Continuous>  dextrose 50% Injectable 12.5 Gram(s) IV Push once  dextrose 50% Injectable 25 Gram(s) IV Push once  dextrose 50% Injectable 25 Gram(s) IV Push once  diltiazem    milliGRAM(s) Oral daily  ferrous    sulfate 325 milliGRAM(s) Oral daily  guaiFENesin  milliGRAM(s) Oral every 12 hours  insulin glargine Injectable (LANTUS) 12 Unit(s) SubCutaneous at bedtime  insulin lispro (ADMELOG) corrective regimen sliding scale   SubCutaneous at bedtime  insulin lispro (ADMELOG) corrective regimen sliding scale.   SubCutaneous three times a day before meals  insulin lispro Injectable (ADMELOG) 4 Unit(s) SubCutaneous three times a day before meals  montelukast 10 milliGRAM(s) Oral at bedtime  multivitamin 1 Tablet(s) Oral daily  pantoprazole    Tablet 40 milliGRAM(s) Oral before breakfast  polyethylene glycol 3350 17 Gram(s) Oral daily  predniSONE   Tablet 20 milliGRAM(s) Oral daily  senna 2 Tablet(s) Oral at bedtime  tiotropium 18 MICROgram(s) Capsule 1 Capsule(s) Inhalation daily    MEDICATIONS  (PRN):  acetaminophen   Tablet .. 650 milliGRAM(s) Oral every 6 hours PRN Mild Pain (1 - 3)  ALPRAZolam 0.25 milliGRAM(s) Oral every 8 hours PRN anxiety  bisacodyl Suppository 10 milliGRAM(s) Rectal daily PRN Constipation  dextrose 40% Gel 15 Gram(s) Oral once PRN Blood Glucose LESS THAN 70 milliGRAM(s)/deciliter  glucagon  Injectable 1 milliGRAM(s) IntraMuscular once PRN Glucose LESS THAN 70 milligrams/deciliter  lactulose Syrup 15 Gram(s) Oral two times a day PRN constipation      Allergies    No Known Allergies    Intolerances    albuterol (Unknown)      REVIEW OF SYSTEMS:  CONSTITUTIONAL: No fever or chills  HEENT:  No headache, no sore throat  RESPIRATORY: No cough, wheezing. Positive for shortness of breath  CARDIOVASCULAR: No chest pain, palpitations  GASTROINTESTINAL: No abd pain, nausea, vomiting, or diarrhea  GENITOURINARY: No dysuria, frequency, or hematuria  NEUROLOGICAL: no focal weakness or dizziness  MUSCULOSKELETAL: no myalgias     Vital Signs Last 24 Hrs  T(C): 36.5 (23 Oct 2020 05:56), Max: 36.9 (22 Oct 2020 12:44)  T(F): 97.7 (23 Oct 2020 05:56), Max: 98.4 (22 Oct 2020 12:44)  HR: 69 (23 Oct 2020 05:56) (69 - 83)  BP: 162/89 (23 Oct 2020 05:56) (122/60 - 162/89)  BP(mean): --  RR: 18 (23 Oct 2020 05:56) (18 - 20)  SpO2: 90% (23 Oct 2020 05:56) (90% - 98%)    PHYSICAL EXAM:  GENERAL: NAD, laying in bed comfortably, on NC  HEENT:  anicteric, moist mucous membranes  CHEST/LUNG:  Decreased BS b/l. No rales, wheezes, or rhonchi  HEART:  RRR, S1, S2  ABDOMEN:  BS+, soft, nontender, nondistended  EXTREMITIES: 1+ pitting edema of RLE. No cyanosis, or calf tenderness  NERVOUS SYSTEM: answers questions and follows commands appropriately    LABS:                        9.9    10.68 )-----------( 233      ( 23 Oct 2020 08:34 )             33.3     CBC Full  -  ( 23 Oct 2020 08:34 )  WBC Count : 10.68 K/uL  Hemoglobin : 9.9 g/dL  Hematocrit : 33.3 %  Platelet Count - Automated : 233 K/uL  Mean Cell Volume : 82.8 fl  Mean Cell Hemoglobin : 24.6 pg  Mean Cell Hemoglobin Concentration : 29.7 gm/dL  Auto Neutrophil # : x  Auto Lymphocyte # : x  Auto Monocyte # : x  Auto Eosinophil # : x  Auto Basophil # : x  Auto Neutrophil % : x  Auto Lymphocyte % : x  Auto Monocyte % : x  Auto Eosinophil % : x  Auto Basophil % : x    23 Oct 2020 08:34    139    |  95     |  35     ----------------------------<  195    3.7     |  41     |  1.20     Ca    9.0        23 Oct 2020 08:34          CAPILLARY BLOOD GLUCOSE      POCT Blood Glucose.: 211 mg/dL (23 Oct 2020 07:58)  POCT Blood Glucose.: 202 mg/dL (22 Oct 2020 21:53)  POCT Blood Glucose.: 130 mg/dL (22 Oct 2020 16:51)  POCT Blood Glucose.: 373 mg/dL (22 Oct 2020 12:11)          RADIOLOGY & ADDITIONAL TESTS:    Personally reviewed.     Consultant(s) Notes Reviewed:  [x] YES  [ ] NO

## 2020-10-23 NOTE — PROGRESS NOTE ADULT - ASSESSMENT
AURORA on CKD 2  Hyponatremia  Hypokalemia  COPD  Hypercalcemia  Metabolic Alkalosis     -BLCr 1  -AURORA unclear etiology, clinically ATN  -UA - 3-5 WBC, trace ketones, 30 proteins  -FeUrea 34.6%  -UPC 0.44  -Ur osm 454  -Corrected calcium 10.2, with paraprotein gap and anemia; FLC ratio is normal; SPEP with gamma migrating protein seen  -Iron repletion   -Metformin on hold  -Renal indices are trending up, Hold bumex at this time, Likely restart at lower dose in 24-48hours  -Less likely AIN given paucity of WBC on UA and no peripheral eosinophils  -Strict I&Os  -Pulm follow up noted  -Heme/onc note reviewed.  Possible MGUS, repeat studies as outpatient  -Alkalosis likely 2/2 compensation for respiratory acidosis; Repeat ABG with CO2 Retention.  pH normal.  No diamox at this time, but may be necessary.    Thank you

## 2020-10-23 NOTE — PROGRESS NOTE ADULT - PROBLEM SELECTOR PROBLEM 9
Chronic pain
Need for prophylactic measure
Chronic pain

## 2020-10-23 NOTE — PROGRESS NOTE ADULT - ASSESSMENT
Pt. with endstage COPD on transplant list, with acute pneumonia.  Pt  to go home today. To have home PT.  Awaiting  nocturnal NIV/Trilogy.  Improved clinically.    Unlikely candidate for lung transplant.  Prognosis overall poor.     FU Dr Mathew as outpt.   Can taper steroids.     Inhalers.

## 2020-10-23 NOTE — PROGRESS NOTE ADULT - SUBJECTIVE AND OBJECTIVE BOX
[facility] -- Adriana Gonzales, Gina, Funmilayo, Dylan Cormier Savella  Office # [phone]      Follow Up:  SOB    Subjective/Observations: Seen and examined.  Sleeping with Bipap in place sitting up in bed.  Arouses easily.  No new complaints.  Denies cp, sob or palpitations presently.  NAD.        REVIEW OF SYSTEMS: All other review of systems is negative unless indicated above    PAST MEDICAL & SURGICAL HISTORY:  Ascorbic acid deficiency    Iron deficiency anemia    Constipation    GERD without esophagitis    Type 2 diabetes mellitus    HTN (hypertension)    Heart failure    End stage COPD  on 3LNC    Atrial fibrillation    Hyperlipemia    Chronic sinusitis    Raynaud phenomenon    Claustrophobia    COPD (chronic obstructive pulmonary disease)    S/P tonsillectomy        MEDICATIONS  (STANDING):  apixaban 5 milliGRAM(s) Oral every 12 hours  aspirin  chewable 81 milliGRAM(s) Oral daily  atorvastatin 80 milliGRAM(s) Oral at bedtime  azithromycin   Tablet 500 milliGRAM(s) Oral daily  Biotene Dry Mouth Oral Rinse 5 milliLiter(s) Swish and Spit two times a day  budesonide 160 MICROgram(s)/formoterol 4.5 MICROgram(s) Inhaler 2 Puff(s) Inhalation two times a day  cholecalciferol 1000 Unit(s) Oral daily  dextrose 5%. 1000 milliLiter(s) (50 mL/Hr) IV Continuous <Continuous>  dextrose 50% Injectable 12.5 Gram(s) IV Push once  dextrose 50% Injectable 25 Gram(s) IV Push once  dextrose 50% Injectable 25 Gram(s) IV Push once  diltiazem    milliGRAM(s) Oral daily  ferrous    sulfate 325 milliGRAM(s) Oral daily  guaiFENesin  milliGRAM(s) Oral every 12 hours  insulin glargine Injectable (LANTUS) 12 Unit(s) SubCutaneous at bedtime  insulin lispro (ADMELOG) corrective regimen sliding scale   SubCutaneous at bedtime  insulin lispro (ADMELOG) corrective regimen sliding scale.   SubCutaneous three times a day before meals  insulin lispro Injectable (ADMELOG) 4 Unit(s) SubCutaneous three times a day before meals  montelukast 10 milliGRAM(s) Oral at bedtime  multivitamin 1 Tablet(s) Oral daily  pantoprazole    Tablet 40 milliGRAM(s) Oral before breakfast  polyethylene glycol 3350 17 Gram(s) Oral daily  predniSONE   Tablet 20 milliGRAM(s) Oral daily  senna 2 Tablet(s) Oral at bedtime  tiotropium 18 MICROgram(s) Capsule 1 Capsule(s) Inhalation daily    MEDICATIONS  (PRN):  acetaminophen   Tablet .. 650 milliGRAM(s) Oral every 6 hours PRN Mild Pain (1 - 3)  ALPRAZolam 0.25 milliGRAM(s) Oral every 8 hours PRN anxiety  bisacodyl Suppository 10 milliGRAM(s) Rectal daily PRN Constipation  dextrose 40% Gel 15 Gram(s) Oral once PRN Blood Glucose LESS THAN 70 milliGRAM(s)/deciliter  glucagon  Injectable 1 milliGRAM(s) IntraMuscular once PRN Glucose LESS THAN 70 milligrams/deciliter  lactulose Syrup 15 Gram(s) Oral two times a day PRN constipation      Allergies    No Known Allergies    Intolerances    albuterol (Unknown)          Vital Signs Last 24 Hrs  T(C): 36.5 (23 Oct 2020 05:56), Max: 36.9 (22 Oct 2020 12:44)  T(F): 97.7 (23 Oct 2020 05:56), Max: 98.4 (22 Oct 2020 12:44)  HR: 69 (23 Oct 2020 05:56) (69 - 83)  BP: 162/89 (23 Oct 2020 05:56) (122/60 - 162/89)  BP(mean): --  RR: 18 (23 Oct 2020 05:56) (18 - 20)  SpO2: 90% (23 Oct 2020 05:56) (90% - 98%)    I&O's Summary    22 Oct 2020 07:01  -  23 Oct 2020 07:00  --------------------------------------------------------  IN: 0 mL / OUT: 100 mL / NET: -100 mL          PHYSICAL EXAM:  TELE: Not on tele  Constitutional: NAD, arouses easily, well-developed, obese with Bipap in place.   HEENT: Moist Mucous Membranes, Anicteric  Pulmonary: Non-labored, breath sounds are clear anteriorly but diminished posteriorly, No wheezing, rales or rhonchi  Cardiovascular: Regular, S1 and S2, No murmurs, rubs, gallops or clicks  Gastrointestinal: Bowel Sounds present, soft, nontender.   Lymph: No peripheral edema. No lymphadenopathy.  Skin: No visible rashes or ulcers.  Psych:  Mood & affect appropriate    LABS: All Labs Reviewed:                        9.9    10.68 )-----------( 233      ( 23 Oct 2020 08:34 )             33.3                         11.2   10.53 )-----------( 278      ( 22 Oct 2020 09:15 )             36.8                         9.6    9.68  )-----------( 269      ( 21 Oct 2020 08:53 )             31.9     23 Oct 2020 08:34    139    |  95     |  35     ----------------------------<  195    3.7     |  41     |  1.20   22 Oct 2020 09:15    140    |  94     |  31     ----------------------------<  125    3.9     |  42     |  1.10   21 Oct 2020 08:53    141    |  97     |  36     ----------------------------<  145    3.9     |  42     |  0.98     Ca    9.0        23 Oct 2020 08:34  Ca    10.2       22 Oct 2020 09:15  Ca    9.7        21 Oct 2020 08:53    TPro  7.3    /  Alb  2.9    /  TBili  0.6    /  DBili  x      /  AST  15     /  ALT  22     /  AlkPhos  69     22 Oct 2020 09:15  TPro  6.0    /  Alb  2.5    /  TBili  0.3    /  DBili  x      /  AST  14     /  ALT  21     /  AlkPhos  61     21 Oct 2020 08:53    < from: 12 Lead ECG (10.08.20 @ 08:51) >  Ventricular Rate 109 BPM    Atrial Rate 109 BPM    P-R Interval 150 ms    QRS Duration 136 ms    Q-T Interval 390 ms    QTC Calculation(Bazett) 525 ms    P Axis 56 degrees    R Axis -16 degrees    T Axis 57 degrees    Diagnosis Line Sinus tachycardia with premature supraventricular complexes  Right bundle branch block  Abnormal ECG  Confirmed by LUIS ENRIQUE CORMIER ([id number]) on 10/8/2020 11:41:04 AM    < end of copied text >    < from: TTE Echo Complete w/o Contrast w/ Doppler (10.06.20 @ 17:10) >   EXAM:  ECHO TTE WO CON COMP W DOPP         PROCEDURE DATE:  10/06/2020        INTERPRETATION:  INDICATION: Rule out infectious endocarditis  Sonographer AS    Blood Pressure 135/74    Height 162.6 cm     Weight 76.2 kg       BSA 1.8 sq m    Dimensions:  LA 3.7       Normal Values: 2.0 - 4.0 cm  Ao 2.7        Normal Values: 2.0 - 3.8 cm  SEPTUM 1.4       Normal Values: 0.6 - 1.2 cm  PWT 1.3       Normal Values: 0.6 - 1.1 cm  LVIDd 3.9         Normal Values: 3.0 - 5.6 cm  LVIDs 2.8         Normal Values: 1.8 - 4.0 cm        OBSERVATIONS:  Technically difficult and limited study  Mitral Valve: Thickened leaflets, mild MR.  Aortic Valve/Aorta: Aortic valve is not well-visualized, grossly normal function without severe pathology  Tricuspid Valve: normal with trace TR.  Pulmonic Valve: Not well-visualized  Left Atrium: normal  Right Atrium: Not well-visualized  Left Ventricle: normal LV size and systolic function, estimated LVEF of 55%. Mild LVH. Endocardium is not well-visualized, cannot rule out segmental dysfunction  Right Ventricle: Grossly normal size and systolic function.  Pericardium/Pleura: normal, no significant pericardial effusion.  Pulmonary/RV Pressure: estimated PA systolic pressure of 15.7 mmHg assuming an RA pressure of 3 mmHg.  LV diastolic dysfunction is present    Conclusion:  Technically difficult and limited study  Mild concentric LVH with grossly normal left ventricular systolic function, estimated LVEF of 55%.  Grossly normal RV size and systolic function.  Aortic valve is not well-visualized, grossly normal function without severe pathology  Mild MR  Trace TR.  No significant pericardial effusion.              CARINA COVARRUBIAS   This document has been electronically signed. Oct  7 2020  8:35AM    < end of copied text >      < from: US Duplex Venous Lower Ext Ltd, Right (10.22.20 @ 11:17) >  EXAM:  US DPLX LWR EXT VEINS LTD RT                            PROCEDURE DATE:  10/22/2020          INTERPRETATION:  CLINICAL STATEMENT: Swelling leg.    TECHNIQUE: Ultrasound of right lower extremity deep venous system.    COMPARISON: None.    FINDINGS:  There is color and spectral flow, compression and augmentation of the common femoral, superficial femoral and popliteal veins.    There is flow in the posterior tibial vein.    IMPRESSION:  No evidence of DVT.              LIZZIE GIORDANO MD; Attending Radiologist  This document has been electronically signed. Oct 22 2020 11:32AM    < end of copied text >    < from: Xray Chest 1 View-PORTABLE IMMEDIATE (Xray Chest 1 View-PORTABLE IMMEDIATE .) (10.10.20 @ 09:54) >  XAM:  XR CHEST PORTABLE IMMED 1V                            PROCEDURE DATE:  10/10/2020          INTERPRETATION:  INDICATION: Pneumonia; follow-up assessment.    PRIORS: 10/8/2020.    VIEWS: Portable AP radiography of the chest performed.    FINDINGS: Heart size appears within normal limits. No hilar or superior mediastinal abnormalities are identified. Infiltrates within the bilateral mid to lower lung fields are without significant change. No pleural effusion or pneumothorax is demonstrated.No mediastinal shift is noted. The visualized osseous structures appear unremarkable.    IMPRESSION: No significant interval change.              GUILLE BOBBY M.D., ATTENDING RADIOLOGIST    < end of copied text >

## 2020-10-23 NOTE — PROGRESS NOTE ADULT - SUBJECTIVE AND OBJECTIVE BOX
Patient is a 60y old  Female who presents with a chief complaint of COPD exacerbation, PNA (08 Oct 2020 11:37)       HPI:  60F w/ end stage COPD on 3LNC (pending transplant list at [facility]), former smoker, CAD s/p stent (2016), afib on eliquis, uncontrolled DM2 (on insulin), raynaud phenomenon and recent hospitalization at [facility] for confusion and AURORA p/w sob. [facility]. Patient states that she has had worsening shortness of breath for past two days. Unable to obtain comprehensive history due to dyspnea. Patient denies fever, chills, sore throat, cough, chest pain, palpitations, abd pain, n/v. Currently feels short of breath even after receiving duonebs and steroids in the ED. Unable to keep mask on during interview and lay back in stretcher due to subjective sob. Patient repeatedly stating that it is too hot in her room and fanning herself with a paper. Per chart, Dr. Mathew (PCP) has chart notes regarding possible hallucinations. Would like her home medications now but otherwise has no acute complaints.  Has not seen nephrologist.  Denies any N/V.  SOB improving.  States she is urinating well.  Denies any urinary retention.  Denies any supplement or NSAID usage.       Follow up acute on CKD Stage 2  No acute events noted, edema improved    PAST MEDICAL & SURGICAL HISTORY:  H/O Raynaud&#x27;s syndrome    Ascorbic acid deficiency    Iron deficiency anemia    Constipation    GERD without esophagitis    Type 2 diabetes mellitus    HTN (hypertension)    Heart failure    HLD (hyperlipidemia)    History of chronic atrial fibrillation  on Eliquis    End stage COPD  on 3LNC    Atrial fibrillation    Hyperlipemia    Chronic sinusitis    Raynaud phenomenon    HTN (hypertension)    DM (diabetes mellitus)    Claustrophobia    COPD (chronic obstructive pulmonary disease)    History of tonsillectomy    S/P tonsillectomy         FAMILY HISTORY:  FH: myocardial infarction    Family history of CABG    FH: MI (myocardial infarction)    FH: CABG (coronary artery bypass surgery)    NC    Social History:Non smoker    MEDICATIONS  (STANDING):  apixaban 5 milliGRAM(s) Oral every 12 hours  ascorbic acid 500 milliGRAM(s) Oral daily  aspirin  chewable 81 milliGRAM(s) Oral daily  atorvastatin 80 milliGRAM(s) Oral at bedtime  azithromycin  IVPB 500 milliGRAM(s) IV Intermittent every 24 hours  budesonide 160 MICROgram(s)/formoterol 4.5 MICROgram(s) Inhaler 2 Puff(s) Inhalation two times a day  cefepime   IVPB 2000 milliGRAM(s) IV Intermittent every 12 hours  cholecalciferol 1000 Unit(s) Oral daily  dextrose 5%. 1000 milliLiter(s) (50 mL/Hr) IV Continuous <Continuous>  dextrose 50% Injectable 12.5 Gram(s) IV Push once  dextrose 50% Injectable 25 Gram(s) IV Push once  dextrose 50% Injectable 25 Gram(s) IV Push once  ferrous    sulfate 325 milliGRAM(s) Oral daily  insulin glargine Injectable (LANTUS) 10 Unit(s) SubCutaneous at bedtime  insulin lispro (HumaLOG) corrective regimen sliding scale   SubCutaneous three times a day before meals  insulin lispro Injectable (HumaLOG) 3 Unit(s) SubCutaneous three times a day before meals  montelukast 10 milliGRAM(s) Oral at bedtime  multivitamin 1 Tablet(s) Oral daily  pantoprazole    Tablet 40 milliGRAM(s) Oral before breakfast  polyethylene glycol 3350 17 Gram(s) Oral daily  potassium chloride    Tablet ER 40 milliEquivalent(s) Oral every 4 hours  senna 2 Tablet(s) Oral at bedtime  tiotropium 18 MICROgram(s) Capsule 1 Capsule(s) Inhalation daily    MEDICATIONS  (PRN):  acetaminophen   Tablet .. 650 milliGRAM(s) Oral every 6 hours PRN Mild Pain (1 - 3)  bisacodyl Suppository 10 milliGRAM(s) Rectal daily PRN Constipation  dextrose 40% Gel 15 Gram(s) Oral once PRN Blood Glucose LESS THAN 70 milliGRAM(s)/deciliter  glucagon  Injectable 1 milliGRAM(s) IntraMuscular once PRN Glucose LESS THAN 70 milligrams/deciliter  guaiFENesin   Syrup  (Sugar-Free) 100 milliGRAM(s) Oral every 6 hours PRN Cough  lactulose Syrup 15 Gram(s) Oral two times a day PRN constipation  levalbuterol Inhalation 0.63 milliGRAM(s) Inhalation every 4 hours PRN shortness of breath and/or wheezing  oxyCODONE    IR 5 milliGRAM(s) Oral every 6 hours PRN Moderate Pain (4 - 6)   Meds reviewed    Allergies    No Known Allergies    Intolerances    albuterol (Unknown)       REVIEW OF SYSTEMS:    CONSTITUTIONAL:  No weight loss   EYES: No eye pain, visual disturbances, or discharge  ENMT:  No difficulty hearing, tinnitus, vertigo; No sinus or throat pain  NECK: No pain or stiffness  BREASTS: No pain, masses, or nipple discharge  RESPIRATORY: +SOB. +NIELSON  CARDIOVASCULAR: No chest pain, palpitations, dizziness,   GASTROINTESTINAL: No abdominal or epigastric pain. No nausea, vomiting, or hematemesis;  GENITOURINARY: No dysuria, frequency, hematuria, or incontinence  NEUROLOGICAL: No headaches, memory loss, loss of strength, numbness, or tremors  SKIN: no Diffuse erythema, no blisters  LYMPH NODES: No enlarged glands  ENDOCRINE: No heat or cold intolerance; No hair loss  MUSCULOSKELETAL: No joint pain or swelling   PSYCHIATRIC: No depression, anxiety, mood swings, or difficulty sleeping  ALLERGY AND IMMUNOLOGIC: No hives or eczema    ICU Vital Signs Last 24 Hrs  T(C): 36.9 (23 Oct 2020 13:04), Max: 36.9 (23 Oct 2020 13:04)  T(F): 98.4 (23 Oct 2020 13:04), Max: 98.4 (23 Oct 2020 13:04)  HR: 68 (23 Oct 2020 13:04) (68 - 79)  BP: 129/74 (23 Oct 2020 13:04) (129/74 - 162/89)  BP(mean): --  ABP: --  ABP(mean): --  RR: 19 (23 Oct 2020 13:04) (18 - 19)  SpO2: 97% (23 Oct 2020 13:04) (90% - 98%)    PHYSICAL EXAM:    GENERAL: No distress  HEAD:  Atraumatic, Normocephalic  EYES: EOMI, conjunctiva and sclera clear  ENMT: No drainage from nares, no drainage from pinna  NECK: Supple, neck  veins full  NERVOUS SYSTEM:  Alert & Oriented X3, Good concentration; Motor Strength wnl upper and lower extremities  CHEST/LUNG: Decreased BS,no rales, no rhonchi, Mild wheezing  HEART: Regular rate and rhythm; No murmurs, rubs, or gallops  ABDOMEN: Soft, Nontender, Nondistended; Bowel sounds present,  EXTREMITIES: trace Edema  SKIN: No rashes or lesions, pale      LABS:                                                       9.9    10.68 )-----------( 233      ( 23 Oct 2020 08:34 )             33.3     10-23    139  |  95<L>  |  35<H>  ----------------------------<  195<H>  3.7   |  41<H>  |  1.20    Ca    9.0      23 Oct 2020 08:34    TPro  7.3  /  Alb  2.9<L>  /  TBili  0.6  /  DBili  x   /  AST  15  /  ALT  22  /  AlkPhos  69  10-22          ABG - ( 23 Oct 2020 12:54 )  pH, Arterial: 7.42  pH, Blood: x     /  pCO2: 62    /  pO2: 82    / HCO3: 37    / Base Excess: 14.5  /  SaO2: 95

## 2020-10-23 NOTE — PROGRESS NOTE ADULT - ASSESSMENT
60F w/ end stage COPD on 3LNC (trying to get on  transplant list at [facility]), former smoker, CAD s/p stent (2016), afib on eliquis, DM2 (on insulin), raynaud phenomenon and recent hospitalization at [facility] for confusion and AURORA p/w sob, admitted for acute on chronic resp failure with hypoxia and hyercarbia sec to multifocal PNA and COPD exacerabtion with end stage COPD.

## 2020-10-23 NOTE — PROGRESS NOTE ADULT - PROBLEM SELECTOR PLAN 4
recent TTE in 08/2020 showing normal LVEF, stage 1 diastolic dysfunction  - TTE (10/6/20) LVEF of 55%   - Patient w/ continued LE edema, mild improvement  - Continue home dose Bumex (1mg PO BID)  - caution with excessive IVF recent TTE in 08/2020 showing normal LVEF, stage 1 diastolic dysfunction  - TTE (10/6/20) LVEF of 55%   - Patient w/ continued LE edema, mild improvement  - Bumex d/c'd by renal 2/2 increased Bicarb  - caution with excessive IVF

## 2020-10-23 NOTE — PROGRESS NOTE ADULT - ASSESSMENT
60F w/ end stage COPD on 3LNC (pending transplant list at [facility]), former smoker, CAD s/p stent (2016), Afib on Eliquis,  uncontrolled DM2 (on insulin), raynaud phenomenon and recent hospitalization at [facility] for confusion and AURORA p/w sob, admitted for COPD exacerbation and multifocal PNA    CAD s/p stent   - No clinical evidence of ACS  - Continue asa and statin  - EKG showed SR @ 109, RBBB that is chronic  - Bumex d/c'd by renal 2/2 increased Bicarb.  May need Diamox as per recs.      Paroxysmal Afib  - Continue Eliquis 5mg  - Continue Cardizem CD at 240mg   - Monitor electrolytes, replete to keep K>4 and Mag>2  - TTE w/ mild concentric LVH grossly nL LVSF ef 55%, mild MR    End stage COPD/Pneumonia  - Pulm following.  Per note, patient is on waiting list for transplant  - CT chest noted  - Continue steroids and antibiotics per Pulm  - Continue NC and BIPAP/CPAP prn/nocturnally  - Continue to encourage incentive spirometer    VTE ppx  - On Eliquis    - Monitor and replete lytes, keep K>4, Mg>2.  - All other medical needs as per primary team.  - Other cardiovascular workup will depend on clinical course.  - Will continue to follow.  - D/C Planning by primary team     LINH Baumann-BC  Cardiology   Spectra #[id number] [phone]

## 2020-10-23 NOTE — PROGRESS NOTE ADULT - PROBLEM SELECTOR PLAN 1
likely multifactorial from multifocal PNA in setting of end stage COPD   - slow clinical improvement  - currently on 5L NC with BIPAP qhs -- maintain O2 >/ 88  - Continue Bumex 1mg BID  - Blood culture negative, initial Sputum cx w/ normal respiratory richie  - repeat Sputum cx (10/12) : positive for rare aspergillus  - Aspergillus antigen WNL  - ID, Dr. Juarez on board, recs appreciated  - Pulm Dr. Rojas on board, recs appreciated  - pending approval for Trilogy machine for home likely multifactorial from multifocal PNA in setting of end stage COPD   - slow clinical improvement  - currently on 5L NC with BIPAP qhs -- maintain O2 >/ 88  - Continue Bumex 1mg BID  - Blood culture negative, initial Sputum cx w/ normal respiratory richie  - repeat Sputum cx (10/12) : positive for rare aspergillus  - Aspergillus antigen WNL  - ID, Dr. Juarez on board, recs appreciated  - Pulm Dr. Rojas on board, recs appreciated  - pending approval for Trilogy machine for home  - Repeat ABG ordered, if worsening bicarb, may need diamox as per nephro. F/u results likely multifactorial from multifocal PNA in setting of end stage COPD   - slow clinical improvement  - currently on 5L NC with BIPAP qhs -- maintain O2 >/ 88  - Blood culture negative, initial Sputum cx w/ normal respiratory richie  - repeat Sputum cx (10/12) : positive for rare aspergillus  - Aspergillus antigen WNL  - ID, Dr. Juarez on board, recs appreciated  - Pulm Dr. Rojas on board, recs appreciated  - pending approval for Trilogy machine for home  - Bumex d/c'd by renal 2/2 increased Bicarb. Repeat ABG ordered, if worsening bicarb, may need diamox as per nephro. F/u results

## 2020-10-23 NOTE — PROGRESS NOTE ADULT - ATTENDING COMMENTS
60F w/ end stage COPD on 3LNC (trying to get on  transplant list at [facility]), former smoker, CAD s/p stent (2016), afib on eliquis, DM2 (on insulin), raynaud phenomenon and recent hospitalization at [facility] for confusion and AURORA p/w sob, admitted for acute on chronic resp failure with hypoxia and hyercarbia sec to multifocal PNA and COPD exacerabtion with end stage COPD. Plan: dc likely tomorrow, trialogy approved, apprec CM collaboration and coordination with family and SW, monitor course, stable for dc tomorrow once trialogy delivered

## 2020-10-23 NOTE — PROGRESS NOTE ADULT - SUBJECTIVE AND OBJECTIVE BOX
PULMONARY/CRITICAL CARE      INTERVAL HPI/OVERNIGHT EVENTS:  Awaiting dc--needs home NIV.    60y FemaleHPI:  60F w/ end stage COPD on 3LNC (pending transplant list at [facility]), former smoker, CAD s/p stent (2016), afib on eliquis, uncontrolled DM2 (on insulin), raynaud phenomenon and recent hospitalization at [facility] for confusion and AURORA p/w sob. [facility] Patient states that she has had worsening shortness of breath for past two days. Unable to obtain comprehensive history due to dyspnea. Patient denies fever, chills, sore throat, cough, chest pain, palpitations, abd pain, n/v. Currently feels short of breath even after receiving duonebs and steroids in the ED. Unable to keep mask on during interview and lay back in stretcher due to subjective sob. Patient repeatedly stating that it is too hot in her room and fanning herself with a paper. Per chart, Dr. Mathew (PCP) has chart notes regarding possible hallucinations. Would like her home medications now but otherwise has no acute complaints.    In the ED, VS T97.7F  /78 RR 21 SpO2 94% on 4LNC. Labs significant for mild leukocytosis of 11.41, H/H 9.9/30.5, thrombophilia of 435. CO2 35, albumin 2.4.   CXR done showing b/l patchy infiltrates, official read pending  CT chest done showing multifocal PNA and reactive mediastinal lymphadenopathy  EKG read limited by artifact, sinus tachycardia with RBBB and premature supraventricular complexes (06 Oct 2020 04:55)        PAST MEDICAL & SURGICAL HISTORY:  Ascorbic acid deficiency    Iron deficiency anemia    Constipation    GERD without esophagitis    Type 2 diabetes mellitus    HTN (hypertension)    Heart failure    End stage COPD  on 3LNC    Atrial fibrillation    Hyperlipemia    Chronic sinusitis    Raynaud phenomenon    Claustrophobia    COPD (chronic obstructive pulmonary disease)    S/P tonsillectomy          ICU Vital Signs Last 24 Hrs  T(C): 36.7 (12 Oct 2020 05:11), Max: 36.8 (11 Oct 2020 13:06)  T(F): 98 (12 Oct 2020 05:11), Max: 98.3 (11 Oct 2020 13:06)  HR: 70 (12 Oct 2020 08:09) (70 - 90)  BP: 138/78 (12 Oct 2020 05:11) (121/73 - 138/78)  BP(mean): --  ABP: --  ABP(mean): --  RR: 17 (12 Oct 2020 05:11) (17 - 22)  SpO2: 99% (12 Oct 2020 08:09) (94% - 100%)    Qtts:     I&O's Summary    11 Oct 2020 07:01  -  12 Oct 2020 07:00  --------------------------------------------------------  IN: 50 mL / OUT: 800 mL / NET: -750 mL                  LABS:                        8.9    10.88 )-----------( 483      ( 11 Oct 2020 07:04 )             30.4     10-11    139  |  99  |  36<H>  ----------------------------<  317<H>  4.4   |  34<H>  |  0.94    Ca    9.2      11 Oct 2020 07:04    TPro  6.3  /  Alb  2.4<L>  /  TBili  0.2  /  DBili  x   /  AST  12<L>  /  ALT  16  /  AlkPhos  70  10-11          vanco through     RADIOLOGY & ADDITIONAL STUDIES:      CRITICAL CARE TIME SPENT:

## 2020-10-23 NOTE — PROGRESS NOTE ADULT - PROBLEM SELECTOR PLAN 3
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 jaramillo's consulted--recommend repeat MGUS studies outpatient

## 2020-10-23 NOTE — PROGRESS NOTE ADULT - PROBLEM SELECTOR PROBLEM 2
COPD (chronic obstructive pulmonary disease)
End stage COPD

## 2020-10-23 NOTE — PROGRESS NOTE ADULT - NSHPATTENDINGPLANDISCUSS_GEN_ALL_CORE
Dr. Hua.
Dr. Hua.
Dr. Ruiz.
Dr. Ruiz.
Dr. Hua.
nursing
Dr Osborn ID, RN Anabela
Dr Sandoval renal
patient, RN, Resident team
patient, RN, Resident team
patient, RN, Resident team, brother
3W IDR team, Dr Benigno Mckoy palliative
hcp/caretaker/brother marisela with pt permission, 3W IDR team, CM Wang Hanson
patient, RN, Resident team
patient, RN, Resident team, ID, Nephrology, 
ID and Pulmonary
patient, RN, Resident team
3W IDR team, Dr Benigno Mckoy palliative
patient, RN, Resident team, Palliative
Dr Bob mcintosh, Dr Juarez ID, Dr Mckeon renal, 3W IDR team
3W IDR team, Dr Benigno Mckoy palliative
Dr Bob mcintosh, 3W IDR team
Dr Juarez ID, Dr Benigno Mckoy palliative, Dr Ackerman PharmD,  Wang Hanson

## 2020-10-24 VITALS
TEMPERATURE: 98 F | DIASTOLIC BLOOD PRESSURE: 74 MMHG | HEART RATE: 71 BPM | SYSTOLIC BLOOD PRESSURE: 132 MMHG | OXYGEN SATURATION: 99 % | RESPIRATION RATE: 22 BRPM

## 2020-10-24 LAB
ALBUMIN SERPL ELPH-MCNC: 2.6 G/DL — LOW (ref 3.3–5)
ALP SERPL-CCNC: 63 U/L — SIGNIFICANT CHANGE UP (ref 40–120)
ALT FLD-CCNC: 18 U/L — SIGNIFICANT CHANGE UP (ref 12–78)
ANION GAP SERPL CALC-SCNC: 5 MMOL/L — SIGNIFICANT CHANGE UP (ref 5–17)
AST SERPL-CCNC: 13 U/L — LOW (ref 15–37)
BASOPHILS # BLD AUTO: 0.03 K/UL — SIGNIFICANT CHANGE UP (ref 0–0.2)
BASOPHILS NFR BLD AUTO: 0.3 % — SIGNIFICANT CHANGE UP (ref 0–2)
BILIRUB SERPL-MCNC: 0.6 MG/DL — SIGNIFICANT CHANGE UP (ref 0.2–1.2)
BUN SERPL-MCNC: 31 MG/DL — HIGH (ref 7–23)
CALCIUM SERPL-MCNC: 8.9 MG/DL — SIGNIFICANT CHANGE UP (ref 8.5–10.1)
CHLORIDE SERPL-SCNC: 96 MMOL/L — SIGNIFICANT CHANGE UP (ref 96–108)
CO2 SERPL-SCNC: 37 MMOL/L — HIGH (ref 22–31)
CREAT SERPL-MCNC: 1 MG/DL — SIGNIFICANT CHANGE UP (ref 0.5–1.3)
EOSINOPHIL # BLD AUTO: 0.27 K/UL — SIGNIFICANT CHANGE UP (ref 0–0.5)
EOSINOPHIL NFR BLD AUTO: 2.3 % — SIGNIFICANT CHANGE UP (ref 0–6)
GLUCOSE SERPL-MCNC: 201 MG/DL — HIGH (ref 70–99)
HCT VFR BLD CALC: 31.3 % — LOW (ref 34.5–45)
HGB BLD-MCNC: 9.6 G/DL — LOW (ref 11.5–15.5)
IMM GRANULOCYTES NFR BLD AUTO: 1.1 % — SIGNIFICANT CHANGE UP (ref 0–1.5)
LYMPHOCYTES # BLD AUTO: 0.65 K/UL — LOW (ref 1–3.3)
LYMPHOCYTES # BLD AUTO: 5.6 % — LOW (ref 13–44)
MCHC RBC-ENTMCNC: 25 PG — LOW (ref 27–34)
MCHC RBC-ENTMCNC: 30.7 GM/DL — LOW (ref 32–36)
MCV RBC AUTO: 81.5 FL — SIGNIFICANT CHANGE UP (ref 80–100)
MONOCYTES # BLD AUTO: 0.6 K/UL — SIGNIFICANT CHANGE UP (ref 0–0.9)
MONOCYTES NFR BLD AUTO: 5.2 % — SIGNIFICANT CHANGE UP (ref 2–14)
NEUTROPHILS # BLD AUTO: 9.94 K/UL — HIGH (ref 1.8–7.4)
NEUTROPHILS NFR BLD AUTO: 85.5 % — HIGH (ref 43–77)
NRBC # BLD: 0 /100 WBCS — SIGNIFICANT CHANGE UP (ref 0–0)
PLATELET # BLD AUTO: 222 K/UL — SIGNIFICANT CHANGE UP (ref 150–400)
POTASSIUM SERPL-MCNC: 4 MMOL/L — SIGNIFICANT CHANGE UP (ref 3.5–5.3)
POTASSIUM SERPL-SCNC: 4 MMOL/L — SIGNIFICANT CHANGE UP (ref 3.5–5.3)
PROT SERPL-MCNC: 6.5 G/DL — SIGNIFICANT CHANGE UP (ref 6–8.3)
RBC # BLD: 3.84 M/UL — SIGNIFICANT CHANGE UP (ref 3.8–5.2)
RBC # FLD: 18.6 % — HIGH (ref 10.3–14.5)
SODIUM SERPL-SCNC: 138 MMOL/L — SIGNIFICANT CHANGE UP (ref 135–145)
WBC # BLD: 11.62 K/UL — HIGH (ref 3.8–10.5)
WBC # FLD AUTO: 11.62 K/UL — HIGH (ref 3.8–10.5)

## 2020-10-24 PROCEDURE — 83880 ASSAY OF NATRIURETIC PEPTIDE: CPT

## 2020-10-24 PROCEDURE — 83735 ASSAY OF MAGNESIUM: CPT

## 2020-10-24 PROCEDURE — 80198 ASSAY OF THEOPHYLLINE: CPT

## 2020-10-24 PROCEDURE — 85048 AUTOMATED LEUKOCYTE COUNT: CPT

## 2020-10-24 PROCEDURE — 83540 ASSAY OF IRON: CPT

## 2020-10-24 PROCEDURE — 83036 HEMOGLOBIN GLYCOSYLATED A1C: CPT

## 2020-10-24 PROCEDURE — 99239 HOSP IP/OBS DSCHRG MGMT >30: CPT | Mod: GC

## 2020-10-24 PROCEDURE — 81001 URINALYSIS AUTO W/SCOPE: CPT

## 2020-10-24 PROCEDURE — 84540 ASSAY OF URINE/UREA-N: CPT

## 2020-10-24 PROCEDURE — 82728 ASSAY OF FERRITIN: CPT

## 2020-10-24 PROCEDURE — 80053 COMPREHEN METABOLIC PANEL: CPT

## 2020-10-24 PROCEDURE — 85025 COMPLETE CBC W/AUTO DIFF WBC: CPT

## 2020-10-24 PROCEDURE — 36415 COLL VENOUS BLD VENIPUNCTURE: CPT

## 2020-10-24 PROCEDURE — 84300 ASSAY OF URINE SODIUM: CPT

## 2020-10-24 PROCEDURE — 84156 ASSAY OF PROTEIN URINE: CPT

## 2020-10-24 PROCEDURE — 84165 PROTEIN E-PHORESIS SERUM: CPT

## 2020-10-24 PROCEDURE — 83521 IG LIGHT CHAINS FREE EACH: CPT

## 2020-10-24 PROCEDURE — 97162 PT EVAL MOD COMPLEX 30 MIN: CPT

## 2020-10-24 PROCEDURE — 71045 X-RAY EXAM CHEST 1 VIEW: CPT

## 2020-10-24 PROCEDURE — 86334 IMMUNOFIX E-PHORESIS SERUM: CPT

## 2020-10-24 PROCEDURE — 82803 BLOOD GASES ANY COMBINATION: CPT

## 2020-10-24 PROCEDURE — 80048 BASIC METABOLIC PNL TOTAL CA: CPT

## 2020-10-24 PROCEDURE — 97530 THERAPEUTIC ACTIVITIES: CPT

## 2020-10-24 PROCEDURE — 97110 THERAPEUTIC EXERCISES: CPT

## 2020-10-24 PROCEDURE — 99232 SBSQ HOSP IP/OBS MODERATE 35: CPT

## 2020-10-24 PROCEDURE — 94640 AIRWAY INHALATION TREATMENT: CPT

## 2020-10-24 PROCEDURE — 83935 ASSAY OF URINE OSMOLALITY: CPT

## 2020-10-24 PROCEDURE — 93005 ELECTROCARDIOGRAM TRACING: CPT

## 2020-10-24 PROCEDURE — 83550 IRON BINDING TEST: CPT

## 2020-10-24 PROCEDURE — 93971 EXTREMITY STUDY: CPT

## 2020-10-24 PROCEDURE — 82962 GLUCOSE BLOOD TEST: CPT

## 2020-10-24 PROCEDURE — 86803 HEPATITIS C AB TEST: CPT

## 2020-10-24 PROCEDURE — 93306 TTE W/DOPPLER COMPLETE: CPT

## 2020-10-24 PROCEDURE — 71250 CT THORAX DX C-: CPT

## 2020-10-24 PROCEDURE — 94668 MNPJ CHEST WALL SBSQ: CPT

## 2020-10-24 PROCEDURE — 76775 US EXAM ABDO BACK WALL LIM: CPT

## 2020-10-24 PROCEDURE — 84145 PROCALCITONIN (PCT): CPT

## 2020-10-24 PROCEDURE — 84443 ASSAY THYROID STIM HORMONE: CPT

## 2020-10-24 PROCEDURE — P9047: CPT

## 2020-10-24 PROCEDURE — 86769 SARS-COV-2 COVID-19 ANTIBODY: CPT

## 2020-10-24 PROCEDURE — U0003: CPT

## 2020-10-24 PROCEDURE — 99285 EMERGENCY DEPT VISIT HI MDM: CPT | Mod: 25

## 2020-10-24 PROCEDURE — 86140 C-REACTIVE PROTEIN: CPT

## 2020-10-24 PROCEDURE — 83605 ASSAY OF LACTIC ACID: CPT

## 2020-10-24 PROCEDURE — 94660 CPAP INITIATION&MGMT: CPT

## 2020-10-24 PROCEDURE — 84155 ASSAY OF PROTEIN SERUM: CPT

## 2020-10-24 PROCEDURE — 94760 N-INVAS EAR/PLS OXIMETRY 1: CPT

## 2020-10-24 PROCEDURE — 87449 NOS EACH ORGANISM AG IA: CPT

## 2020-10-24 PROCEDURE — 87070 CULTURE OTHR SPECIMN AEROBIC: CPT

## 2020-10-24 PROCEDURE — 87899 AGENT NOS ASSAY W/OPTIC: CPT

## 2020-10-24 PROCEDURE — 82570 ASSAY OF URINE CREATININE: CPT

## 2020-10-24 PROCEDURE — 82436 ASSAY OF URINE CHLORIDE: CPT

## 2020-10-24 PROCEDURE — 85027 COMPLETE CBC AUTOMATED: CPT

## 2020-10-24 PROCEDURE — 94667 MNPJ CHEST WALL 1ST: CPT

## 2020-10-24 PROCEDURE — 84100 ASSAY OF PHOSPHORUS: CPT

## 2020-10-24 PROCEDURE — 97116 GAIT TRAINING THERAPY: CPT

## 2020-10-24 PROCEDURE — 82785 ASSAY OF IGE: CPT

## 2020-10-24 PROCEDURE — 87040 BLOOD CULTURE FOR BACTERIA: CPT

## 2020-10-24 RX ORDER — DILTIAZEM HCL 120 MG
1 CAPSULE, EXT RELEASE 24 HR ORAL
Qty: 0 | Refills: 0 | DISCHARGE

## 2020-10-24 RX ORDER — ASCORBIC ACID 60 MG
1 TABLET,CHEWABLE ORAL
Qty: 0 | Refills: 0 | DISCHARGE

## 2020-10-24 RX ORDER — AZITHROMYCIN 500 MG/1
1 TABLET, FILM COATED ORAL
Qty: 0 | Refills: 0 | DISCHARGE
Start: 2020-10-24

## 2020-10-24 RX ORDER — METOCLOPRAMIDE HCL 10 MG
1 TABLET ORAL
Qty: 0 | Refills: 0 | DISCHARGE

## 2020-10-24 RX ORDER — OXYCODONE HYDROCHLORIDE 5 MG/1
1 TABLET ORAL
Qty: 0 | Refills: 0 | DISCHARGE

## 2020-10-24 RX ORDER — DILTIAZEM HCL 120 MG
1 CAPSULE, EXT RELEASE 24 HR ORAL
Qty: 30 | Refills: 0
Start: 2020-10-24 | End: 2020-11-22

## 2020-10-24 RX ORDER — IPRATROPIUM/ALBUTEROL SULFATE 18-103MCG
1.5 AEROSOL WITH ADAPTER (GRAM) INHALATION
Qty: 270 | Refills: 0
Start: 2020-10-24 | End: 2020-11-22

## 2020-10-24 RX ORDER — ALBUTEROL 90 UG/1
2 AEROSOL, METERED ORAL
Qty: 0 | Refills: 0 | DISCHARGE

## 2020-10-24 RX ORDER — IPRATROPIUM/ALBUTEROL SULFATE 18-103MCG
3 AEROSOL WITH ADAPTER (GRAM) INHALATION
Qty: 0 | Refills: 0 | DISCHARGE

## 2020-10-24 RX ORDER — BUMETANIDE 0.25 MG/ML
1 INJECTION INTRAMUSCULAR; INTRAVENOUS
Qty: 0 | Refills: 0 | DISCHARGE

## 2020-10-24 RX ADMIN — Medication 1000 UNIT(S): at 12:18

## 2020-10-24 RX ADMIN — Medication 1.5 MILLILITER(S): at 08:52

## 2020-10-24 RX ADMIN — AZITHROMYCIN 500 MILLIGRAM(S): 500 TABLET, FILM COATED ORAL at 12:18

## 2020-10-24 RX ADMIN — BUDESONIDE AND FORMOTEROL FUMARATE DIHYDRATE 2 PUFF(S): 160; 4.5 AEROSOL RESPIRATORY (INHALATION) at 05:42

## 2020-10-24 RX ADMIN — Medication 600 MILLIGRAM(S): at 05:41

## 2020-10-24 RX ADMIN — Medication 81 MILLIGRAM(S): at 12:18

## 2020-10-24 RX ADMIN — Medication 240 MILLIGRAM(S): at 05:41

## 2020-10-24 RX ADMIN — Medication 2: at 08:36

## 2020-10-24 RX ADMIN — Medication 4 UNIT(S): at 08:37

## 2020-10-24 RX ADMIN — Medication 4 UNIT(S): at 12:19

## 2020-10-24 RX ADMIN — Medication 20 MILLIGRAM(S): at 05:42

## 2020-10-24 RX ADMIN — APIXABAN 5 MILLIGRAM(S): 2.5 TABLET, FILM COATED ORAL at 05:42

## 2020-10-24 RX ADMIN — Medication 6: at 12:19

## 2020-10-24 RX ADMIN — Medication 1 TABLET(S): at 12:19

## 2020-10-24 RX ADMIN — PANTOPRAZOLE SODIUM 40 MILLIGRAM(S): 20 TABLET, DELAYED RELEASE ORAL at 05:41

## 2020-10-24 RX ADMIN — Medication 325 MILLIGRAM(S): at 12:18

## 2020-10-24 NOTE — PROGRESS NOTE ADULT - ASSESSMENT
AURORA on CKD 2  Hyponatremia  Hypokalemia  COPD  Hypercalcemia  Metabolic Alkalosis     -BLCr 1  -AURORA unclear etiology, clinically ATN  -UA - 3-5 WBC, trace ketones, 30 proteins  -FeUrea 34.6%  -UPC 0.44  -Ur osm 454  -Corrected calcium 10.2, with paraprotein gap and anemia; FLC ratio is normal; SPEP with gamma migrating protein seen  -Iron repletion   -Metformin on hold  -Renal indices are trending up, Hold bumex at this time, Likely restart at lower dose in 24-48hours. Awaiting today's lab result   -Less likely AIN given paucity of WBC on UA and no peripheral eosinophils  -Strict I&Os  -Pulm follow up noted  -Heme/onc note reviewed.  Possible MGUS, repeat studies as outpatient  -Alkalosis likely 2/2 compensation for respiratory acidosis; ABG with CO2 Retention.  pH normal.  No diamox needed at this time, but may need if worsen     Thank you

## 2020-10-24 NOTE — PROGRESS NOTE ADULT - SUBJECTIVE AND OBJECTIVE BOX
[facility] -- Adriana Gonzales, Gina, Funmilayo, Dylan Cormier Savella  Office # [phone]      Follow Up:    SOB  Subjective/Observations:   No events overnight resting comfortably in bed.  No complaints of chest pain, dyspnea, or palpitations reported. No signs of orthopnea or PND.     REVIEW OF SYSTEMS: All other review of systems is negative unless indicated above    PAST MEDICAL & SURGICAL HISTORY:  Ascorbic acid deficiency    Iron deficiency anemia    Constipation    GERD without esophagitis    Type 2 diabetes mellitus    HTN (hypertension)    Heart failure    End stage COPD  on 3LNC    Atrial fibrillation    Hyperlipemia    Chronic sinusitis    Raynaud phenomenon    Claustrophobia    COPD (chronic obstructive pulmonary disease)    S/P tonsillectomy        MEDICATIONS  (STANDING):  apixaban 5 milliGRAM(s) Oral every 12 hours  aspirin  chewable 81 milliGRAM(s) Oral daily  atorvastatin 80 milliGRAM(s) Oral at bedtime  azithromycin   Tablet 500 milliGRAM(s) Oral daily  Biotene Dry Mouth Oral Rinse 5 milliLiter(s) Swish and Spit two times a day  budesonide 160 MICROgram(s)/formoterol 4.5 MICROgram(s) Inhaler 2 Puff(s) Inhalation two times a day  cholecalciferol 1000 Unit(s) Oral daily  dextrose 5%. 1000 milliLiter(s) (50 mL/Hr) IV Continuous <Continuous>  dextrose 50% Injectable 12.5 Gram(s) IV Push once  dextrose 50% Injectable 25 Gram(s) IV Push once  dextrose 50% Injectable 25 Gram(s) IV Push once  diltiazem    milliGRAM(s) Oral daily  ferrous    sulfate 325 milliGRAM(s) Oral daily  guaiFENesin  milliGRAM(s) Oral every 12 hours  insulin glargine Injectable (LANTUS) 12 Unit(s) SubCutaneous at bedtime  insulin lispro (ADMELOG) corrective regimen sliding scale   SubCutaneous at bedtime  insulin lispro (ADMELOG) corrective regimen sliding scale.   SubCutaneous three times a day before meals  insulin lispro Injectable (ADMELOG) 4 Unit(s) SubCutaneous three times a day before meals  montelukast 10 milliGRAM(s) Oral at bedtime  multivitamin 1 Tablet(s) Oral daily  pantoprazole    Tablet 40 milliGRAM(s) Oral before breakfast  polyethylene glycol 3350 17 Gram(s) Oral daily  predniSONE   Tablet 20 milliGRAM(s) Oral daily  senna 2 Tablet(s) Oral at bedtime  tiotropium 18 MICROgram(s) Capsule 1 Capsule(s) Inhalation daily    MEDICATIONS  (PRN):  acetaminophen   Tablet .. 650 milliGRAM(s) Oral every 6 hours PRN Mild Pain (1 - 3)  albuterol/ipratropium for Nebulization. 1.5 milliLiter(s) Nebulizer every 4 hours PRN Shortness of Breath and/or Wheezing  bisacodyl Suppository 10 milliGRAM(s) Rectal daily PRN Constipation  dextrose 40% Gel 15 Gram(s) Oral once PRN Blood Glucose LESS THAN 70 milliGRAM(s)/deciliter  glucagon  Injectable 1 milliGRAM(s) IntraMuscular once PRN Glucose LESS THAN 70 milligrams/deciliter  lactulose Syrup 15 Gram(s) Oral two times a day PRN constipation      Allergies    No Known Allergies    Intolerances    albuterol (Unknown)      Vital Signs Last 24 Hrs  T(C): 36.8 (24 Oct 2020 05:38), Max: 36.9 (23 Oct 2020 13:04)  T(F): 98.3 (24 Oct 2020 05:38), Max: 98.4 (23 Oct 2020 13:04)  HR: 79 (24 Oct 2020 08:53) (64 - 89)  BP: 124/79 (24 Oct 2020 05:38) (111/71 - 129/74)  BP(mean): --  RR: 20 (24 Oct 2020 05:38) (19 - 20)  SpO2: 96% (24 Oct 2020 08:53) (92% - 100%)    I&O's Summary        PHYSICAL EXAM:  TELE:   Constitutional: NAD, awake and alert, well-developed  HEENT: Moist Mucous Membranes, Anicteric  Pulmonary: Non-labored, breath sounds are clear bilaterally, No wheezing, crackles or rhonchi  Cardiovascular: Regular, S1 and S2 nl, No murmurs, rubs, gallops or clicks  Gastrointestinal: Bowel Sounds present, soft, nontender.   Lymph: No lymphadenopathy. No peripheral edema.  Skin: No visible rashes or ulcers.  Psych:  Mood & affect appropriate    LABS: All Labs Reviewed:                        9.6    11.62 )-----------( 222      ( 24 Oct 2020 09:22 )             31.3                         9.9    10.68 )-----------( 233      ( 23 Oct 2020 08:34 )             33.3                         11.2   10.53 )-----------( 278      ( 22 Oct 2020 09:15 )             36.8     23 Oct 2020 08:34    139    |  95     |  35     ----------------------------<  195    3.7     |  41     |  1.20   22 Oct 2020 09:15    140    |  94     |  31     ----------------------------<  125    3.9     |  42     |  1.10     Ca    9.0        23 Oct 2020 08:34  Ca    10.2       22 Oct 2020 09:15    TPro  7.3    /  Alb  2.9    /  TBili  0.6    /  DBili  x      /  AST  15     /  ALT  22     /  AlkPhos  69     22 Oct 2020 09:15      < from: 12 Lead ECG (10.08.20 @ 08:51) >  Ventricular Rate 109 BPM    Atrial Rate 109 BPM    P-R Interval 150 ms    QRS Duration 136 ms    Q-T Interval 390 ms    QTC Calculation(Bazett) 525 ms    P Axis 56 degrees    R Axis -16 degrees    T Axis 57 degrees    Diagnosis Line Sinus tachycardia with premature supraventricular complexes  Right bundle branch block  Abnormal ECG  Confirmed by LUIS ENRIQUE CORMIER ([id number]) on 10/8/2020 11:41:04 AM    < end of copied text >    < from: TTE Echo Complete w/o Contrast w/ Doppler (10.06.20 @ 17:10) >   EXAM:  ECHO TTE WO CON COMP W DOPP         PROCEDURE DATE:  10/06/2020        INTERPRETATION:  INDICATION: Rule out infectious endocarditis  Sonographer AS    Blood Pressure 135/74    Height 162.6 cm     Weight 76.2 kg       BSA 1.8 sq m    Dimensions:  LA 3.7       Normal Values: 2.0 - 4.0 cm  Ao 2.7        Normal Values: 2.0 - 3.8 cm  SEPTUM 1.4       Normal Values: 0.6 - 1.2 cm  PWT 1.3       Normal Values: 0.6 - 1.1 cm  LVIDd 3.9         Normal Values: 3.0 - 5.6 cm  LVIDs 2.8         Normal Values: 1.8 - 4.0 cm        OBSERVATIONS:  Technically difficult and limited study  Mitral Valve: Thickened leaflets, mild MR.  Aortic Valve/Aorta: Aortic valve is not well-visualized, grossly normal function without severe pathology  Tricuspid Valve: normal with trace TR.  Pulmonic Valve: Not well-visualized  Left Atrium: normal  Right Atrium: Not well-visualized  Left Ventricle: normal LV size and systolic function, estimated LVEF of 55%. Mild LVH. Endocardium is not well-visualized, cannot rule out segmental dysfunction  Right Ventricle: Grossly normal size and systolic function.  Pericardium/Pleura: normal, no significant pericardial effusion.  Pulmonary/RV Pressure: estimated PA systolic pressure of 15.7 mmHg assuming an RA pressure of 3 mmHg.  LV diastolic dysfunction is present    Conclusion:  Technically difficult and limited study  Mild concentric LVH with grossly normal left ventricular systolic function, estimated LVEF of 55%.  Grossly normal RV size and systolic function.  Aortic valve is not well-visualized, grossly normal function without severe pathology  Mild MR  Trace TR.  No significant pericardial effusion.              CARINA COVARRUBIAS   This document has been electronically signed. Oct  7 2020  8:35AM    < end of copied text >      < from: US Duplex Venous Lower Ext Ltd, Right (10.22.20 @ 11:17) >  EXAM:  US DPLX LWR EXT VEINS LTD RT                            PROCEDURE DATE:  10/22/2020          INTERPRETATION:  CLINICAL STATEMENT: Swelling leg.    TECHNIQUE: Ultrasound of right lower extremity deep venous system.    COMPARISON: None.    FINDINGS:  There is color and spectral flow, compression and augmentation of the common femoral, superficial femoral and popliteal veins.    There is flow in the posterior tibial vein.    IMPRESSION:  No evidence of DVT.              LIZZIE GIORDANO MD; Attending Radiologist  This document has been electronically signed. Oct 22 2020 11:32AM    < end of copied text >    < from: Xray Chest 1 View-PORTABLE IMMEDIATE (Xray Chest 1 View-PORTABLE IMMEDIATE .) (10.10.20 @ 09:54) >  XAM:  XR CHEST PORTABLE IMMED 1V                            PROCEDURE DATE:  10/10/2020          INTERPRETATION:  INDICATION: Pneumonia; follow-up assessment.    PRIORS: 10/8/2020.    VIEWS: Portable AP radiography of the chest performed.    FINDINGS: Heart size appears within normal limits. No hilar or superior mediastinal abnormalities are identified. Infiltrates within the bilateral mid to lower lung fields are without significant change. No pleural effusion or pneumothorax is demonstrated.No mediastinal shift is noted. The visualized osseous structures appear unremarkable.    IMPRESSION: No significant interval change.              GUILLE BOBBY M.D., ATTENDING RADIOLOGIST    < end of copied text >

## 2020-10-24 NOTE — PROGRESS NOTE ADULT - REASON FOR ADMISSION
COPD exacerbation, PNA

## 2020-10-24 NOTE — PROGRESS NOTE ADULT - ASSESSMENT
60F w/ end stage COPD on 3LNC (pending transplant list at [facility]), former smoker, CAD s/p stent (2016), Afib on Eliquis,  uncontrolled DM2 (on insulin), raynaud phenomenon and recent hospitalization at [facility] for confusion and [facility] p/w sob, admitted for COPD exacerbation and multifocal PNA    CAD s/p stent   - No clinical evidence of ACS  - Continue asa and statin  - EKG showed SR @ 109, RBBB that is chronic  - Bumex d/c'd by renal 2/2 increased Bicarb.  May need Diamox as per recs.      Paroxysmal Afib  - Continue Eliquis 5mg  - Continue Cardizem CD at 240mg   - Monitor electrolytes, replete to keep K>4 and Mag>2  - TTE w/ mild concentric LVH grossly nL LVSF ef 55%, mild MR    End stage COPD/Pneumonia  - Pulm following.  Per note, patient is on waiting list for transplant  - CT chest noted  - Continue steroids and antibiotics per Pulm  - Continue NC and BIPAP/CPAP prn/nocturnally  - Continue to encourage incentive spirometer    VTE ppx  - On Eliquis    - Monitor and replete lytes, keep K>4, Mg>2.  - All other medical needs as per primary team.  - Other cardiovascular workup will depend on clinical course.  - Will continue to follow.  - D/C Planning by primary team     David Albert DNP, ANP-c  Cardiology   Spectra #[phone]

## 2020-10-24 NOTE — PROGRESS NOTE ADULT - SUBJECTIVE AND OBJECTIVE BOX
Patient is a 60y old  Female who presents with a chief complaint of COPD exacerbation, PNA (08 Oct 2020 11:37)       HPI:  60F w/ end stage COPD on 3LNC (pending transplant list at [facility]), former smoker, CAD s/p stent (2016), afib on eliquis, uncontrolled DM2 (on insulin), raynaud phenomenon and recent hospitalization at [facility] for confusion and [facility] Patient states that she has had worsening shortness of breath for past two days. Unable to obtain comprehensive history due to dyspnea. Patient denies fever, chills, sore throat, cough, chest pain, palpitations, abd pain, n/v. Currently feels short of breath even after receiving duonebs and steroids in the ED. Unable to keep mask on during interview and lay back in stretcher due to subjective sob. Patient repeatedly stating that it is too hot in her room and fanning herself with a paper. Per chart, Dr. Mathew (PCP) has chart notes regarding possible hallucinations. Would like her home medications now but otherwise has no acute complaints.  Has not seen nephrologist.  Denies any N/V.  SOB improving.  States she is urinating well.  Denies any urinary retention.  Denies any supplement or NSAID usage.       Follow up acute on CKD Stage 2  No acute events noted, edema improved    PAST MEDICAL & SURGICAL HISTORY:  H/O Raynaud&#x27;s syndrome    Ascorbic acid deficiency    Iron deficiency anemia    Constipation    GERD without esophagitis    Type 2 diabetes mellitus    HTN (hypertension)    Heart failure    HLD (hyperlipidemia)    History of chronic atrial fibrillation  on Eliquis    End stage COPD  on 3LNC    Atrial fibrillation    Hyperlipemia    Chronic sinusitis    Raynaud phenomenon    HTN (hypertension)    DM (diabetes mellitus)    Claustrophobia    COPD (chronic obstructive pulmonary disease)    History of tonsillectomy    S/P tonsillectomy         FAMILY HISTORY:  FH: myocardial infarction    Family history of CABG    FH: MI (myocardial infarction)    FH: CABG (coronary artery bypass surgery)    NC    Social History:Non smoker    MEDICATIONS  (STANDING):  apixaban 5 milliGRAM(s) Oral every 12 hours  ascorbic acid 500 milliGRAM(s) Oral daily  aspirin  chewable 81 milliGRAM(s) Oral daily  atorvastatin 80 milliGRAM(s) Oral at bedtime  azithromycin  IVPB 500 milliGRAM(s) IV Intermittent every 24 hours  budesonide 160 MICROgram(s)/formoterol 4.5 MICROgram(s) Inhaler 2 Puff(s) Inhalation two times a day  cefepime   IVPB 2000 milliGRAM(s) IV Intermittent every 12 hours  cholecalciferol 1000 Unit(s) Oral daily  dextrose 5%. 1000 milliLiter(s) (50 mL/Hr) IV Continuous <Continuous>  dextrose 50% Injectable 12.5 Gram(s) IV Push once  dextrose 50% Injectable 25 Gram(s) IV Push once  dextrose 50% Injectable 25 Gram(s) IV Push once  ferrous    sulfate 325 milliGRAM(s) Oral daily  insulin glargine Injectable (LANTUS) 10 Unit(s) SubCutaneous at bedtime  insulin lispro (HumaLOG) corrective regimen sliding scale   SubCutaneous three times a day before meals  insulin lispro Injectable (HumaLOG) 3 Unit(s) SubCutaneous three times a day before meals  montelukast 10 milliGRAM(s) Oral at bedtime  multivitamin 1 Tablet(s) Oral daily  pantoprazole    Tablet 40 milliGRAM(s) Oral before breakfast  polyethylene glycol 3350 17 Gram(s) Oral daily  potassium chloride    Tablet ER 40 milliEquivalent(s) Oral every 4 hours  senna 2 Tablet(s) Oral at bedtime  tiotropium 18 MICROgram(s) Capsule 1 Capsule(s) Inhalation daily    MEDICATIONS  (PRN):  acetaminophen   Tablet .. 650 milliGRAM(s) Oral every 6 hours PRN Mild Pain (1 - 3)  bisacodyl Suppository 10 milliGRAM(s) Rectal daily PRN Constipation  dextrose 40% Gel 15 Gram(s) Oral once PRN Blood Glucose LESS THAN 70 milliGRAM(s)/deciliter  glucagon  Injectable 1 milliGRAM(s) IntraMuscular once PRN Glucose LESS THAN 70 milligrams/deciliter  guaiFENesin   Syrup  (Sugar-Free) 100 milliGRAM(s) Oral every 6 hours PRN Cough  lactulose Syrup 15 Gram(s) Oral two times a day PRN constipation  levalbuterol Inhalation 0.63 milliGRAM(s) Inhalation every 4 hours PRN shortness of breath and/or wheezing  oxyCODONE    IR 5 milliGRAM(s) Oral every 6 hours PRN Moderate Pain (4 - 6)   Meds reviewed    Allergies    No Known Allergies    Intolerances    albuterol (Unknown)       REVIEW OF SYSTEMS:    CONSTITUTIONAL:  No weight loss   EYES: No eye pain, visual disturbances, or discharge  ENMT:  No difficulty hearing, tinnitus, vertigo; No sinus or throat pain  NECK: No pain or stiffness  BREASTS: No pain, masses, or nipple discharge  RESPIRATORY: +SOB. +NIELSON  CARDIOVASCULAR: No chest pain, palpitations, dizziness,   GASTROINTESTINAL: No abdominal or epigastric pain. No nausea, vomiting, or hematemesis;  GENITOURINARY: No dysuria, frequency, hematuria, or incontinence  NEUROLOGICAL: No headaches, memory loss, loss of strength, numbness, or tremors  SKIN: no Diffuse erythema, no blisters  LYMPH NODES: No enlarged glands  ENDOCRINE: No heat or cold intolerance; No hair loss  MUSCULOSKELETAL: No joint pain or swelling   PSYCHIATRIC: No depression, anxiety, mood swings, or difficulty sleeping  ALLERGY AND IMMUNOLOGIC: No hives or eczema    ICU Vital Signs Last 24 Hrs  T(C): 36.9 (23 Oct 2020 13:04), Max: 36.9 (23 Oct 2020 13:04)  T(F): 98.4 (23 Oct 2020 13:04), Max: 98.4 (23 Oct 2020 13:04)  HR: 68 (23 Oct 2020 13:04) (68 - 79)  BP: 129/74 (23 Oct 2020 13:04) (129/74 - 162/89)  BP(mean): --  ABP: --  ABP(mean): --  RR: 19 (23 Oct 2020 13:04) (18 - 19)  SpO2: 97% (23 Oct 2020 13:04) (90% - 98%)    PHYSICAL EXAM:    GENERAL: No distress  HEAD:  Atraumatic, Normocephalic  EYES: EOMI, conjunctiva and sclera clear  ENMT: No drainage from nares, no drainage from pinna  NECK: Supple, neck  veins full  NERVOUS SYSTEM:  Alert & Oriented X3, Good concentration; Motor Strength wnl upper and lower extremities  CHEST/LUNG: Decreased BS,no rales, no rhonchi, Mild wheezing  HEART: Regular rate and rhythm; No murmurs, rubs, or gallops  ABDOMEN: Soft, Nontender, Nondistended; Bowel sounds present,  EXTREMITIES: trace Edema  SKIN: No rashes or lesions, pale      LABS:  Pending

## 2020-10-26 ENCOUNTER — APPOINTMENT (OUTPATIENT)
Dept: INTERNAL MEDICINE | Facility: CLINIC | Age: 60
End: 2020-10-26
Payer: COMMERCIAL

## 2020-10-26 PROCEDURE — 99441: CPT

## 2020-10-28 ENCOUNTER — NON-APPOINTMENT (OUTPATIENT)
Age: 60
End: 2020-10-28

## 2020-10-30 NOTE — PROGRESS NOTE ADULT - PROBLEM SELECTOR PLAN 6
- Nephrectomy of right kidney  - Urology is following closely outpatient, due to concern about valve function  - Pt reports suprapubic pain, which she reports is associated with UTI  - Continue chronic Bactrim therapy  - Pt denies dysuria, frequency, and urgency   Dopplers negative for DVT.  Echo report noted, mild diastolic dysfunction, no significant changes from prior study in 2014.  Suspect leg edema may be related to recent high dose steroids.  C/w IV lasix

## 2020-11-03 ENCOUNTER — NON-APPOINTMENT (OUTPATIENT)
Age: 60
End: 2020-11-03

## 2020-11-03 ENCOUNTER — APPOINTMENT (OUTPATIENT)
Dept: INTERNAL MEDICINE | Facility: CLINIC | Age: 60
End: 2020-11-03
Payer: COMMERCIAL

## 2020-11-03 PROCEDURE — 99441: CPT

## 2020-11-04 ENCOUNTER — NON-APPOINTMENT (OUTPATIENT)
Age: 60
End: 2020-11-04

## 2020-11-09 ENCOUNTER — NON-APPOINTMENT (OUTPATIENT)
Age: 60
End: 2020-11-09

## 2020-11-09 ENCOUNTER — APPOINTMENT (OUTPATIENT)
Dept: INTERNAL MEDICINE | Facility: CLINIC | Age: 60
End: 2020-11-09
Payer: COMMERCIAL

## 2020-11-09 PROCEDURE — 99441: CPT

## 2020-11-27 ENCOUNTER — NON-APPOINTMENT (OUTPATIENT)
Age: 60
End: 2020-11-27

## 2020-11-28 ENCOUNTER — RX RENEWAL (OUTPATIENT)
Age: 60
End: 2020-11-28

## 2020-11-30 RX ORDER — MONTELUKAST 10 MG/1
10 TABLET, FILM COATED ORAL
Qty: 90 | Refills: 1 | Status: ACTIVE | COMMUNITY
Start: 2019-02-13 | End: 1900-01-01

## 2020-11-30 NOTE — CHART NOTE - NSCHARTNOTEFT_GEN_A_CORE
MEDICINE NP    GUY HARTMAN  58y Female    Patient is a 58y old  Female who presents with a chief complaint of dyspnea (08 Jan 2019 14:40)       > Event Summary:  10PM Notified by RN, Patient with c/o b/l foot redness and seen at bedside.  Reports patient with closed blister to left dorsal foot and had Cavalon dressing to area.    Patient reports b/l dorsal foot with "burning sensation" and redness starting today.  States she had "holistic therapy" and unclear if any topical products were applied.   Denies paresthesia, loss or rom, trauma.    Exam with Left dorsal foot closed blister. b/l dorsal foot with redness, no warmth or streaking. +DP/PT b/l   6AM:  Re-evaluated this AM and reports no relief from burning sensation     >Assessment & Plan:  HPI:  Patient is a 59 yo F PMH COPD, chronic hypoxic resp failure on home O2 3L, HTN, HLD, CAD s/p 6 stents, dCHF, T2DM, PVD.  Admitted COPD exacerbation/ acute on chronic hypoxic/hypercarbic respiratory failure/ CHF on BIPAP and Lasix. 12/4 CTA - NEG PE.  Hospitalization with new onset AFib RVR s/p successful CV, titrating meds.   TTE 12/07/18 w/ Normal mitral valve.. Normal left ventricular systolic function. . Mild diastolic dysfunction (Stage I).  EF 71%.   Bilat LE US 12/06/18 No evidence of bilateral lower extremity deep venous thrombosis.  Now with redness to b/l dorsal foot.    1) B/L Feet Rash : Concern Cavalon Reaction vs Holistic Therapy reaction vs Neuropathy vs ?Infectious process  -Bacitracin and Hydrocortisone Ointment  applied to area for no relief  -f/u Dermatology Consult by Day team -order placed in sunrise  -Neurovascular checks q4h  -Can consider MG/PVR if indicated    2) Left Dorsal Foot Closed Blister   -Left dorsal foot closed blister with Cavalon dressing, now removed. Will c/w off and monitor   -f/u Podiatry Consult -Order placed in sunrise    -Endorsed to incoming day NP      PARAM Lucero-BC  Medicine Department  #[id number] [FreeTextEntry1] : 76 yo female with a left leg  wound excisionally debrided once per week\par s/p excisional debridement today\par The patient presents with a wound to the left leg-.  Swelling noted to the extremity.  \par GALA/PVR: wnl \par \par No clinical sign of infection\par Recommendation:\par \par Apply lidocaine or topical anesthetic.\par Wash wound with ----0.9% saline/ Dakins 0.125%.or VASHE\par Apply ---- santyl/\par Apply ----VAC\par Change dressing ---/ 3 times per week.\par Leg elevation as tolerated\par Encouraged ambulation or exercise.\par Optimization of nutrition.\par Offloading to the wound site.\par \par -----Homecare orders in place\par \par Follow up appointment scheduled for 1 week\par \par On Eliquis- h/o hematoma\par Methotrexate stopped, but remains on Prednisone for RA\par h/o 3 pregnancies and family h/o varicose veins.

## 2020-12-02 ENCOUNTER — NON-APPOINTMENT (OUTPATIENT)
Age: 60
End: 2020-12-02

## 2020-12-03 ENCOUNTER — APPOINTMENT (OUTPATIENT)
Dept: INTERNAL MEDICINE | Facility: CLINIC | Age: 60
End: 2020-12-03
Payer: COMMERCIAL

## 2020-12-03 ENCOUNTER — RX CHANGE (OUTPATIENT)
Age: 60
End: 2020-12-03

## 2020-12-03 PROCEDURE — 99495 TRANSJ CARE MGMT MOD F2F 14D: CPT | Mod: 95

## 2020-12-03 RX ORDER — ESCITALOPRAM OXALATE 5 MG/1
5 TABLET ORAL
Qty: 30 | Refills: 1 | Status: DISCONTINUED | COMMUNITY
Start: 2020-11-09 | End: 2020-12-03

## 2020-12-03 RX ORDER — AMOXICILLIN AND CLAVULANATE POTASSIUM 875; 125 MG/1; MG/1
875-125 TABLET, COATED ORAL TWICE DAILY
Qty: 20 | Refills: 0 | Status: COMPLETED | COMMUNITY
Start: 2020-12-03 | End: 2020-12-13

## 2020-12-03 RX ORDER — THEOPHYLLINE 400 MG/1
400 TABLET, EXTENDED RELEASE ORAL
Qty: 90 | Refills: 0 | Status: DISCONTINUED | COMMUNITY
Start: 2019-02-13 | End: 2020-12-03

## 2020-12-06 NOTE — ASSESSMENT
[FreeTextEntry1] : Right conjunctivitis\par ? broken vessel\par Keep clean\par Apply warm compresses\par Eye drops prescribed\par Ophtho f/u\par \par The patient has end-stage emphysema/she is awaiting lung transplantation\par She will continue BiPAP therapy\par She was advised to use her BIPAP at MD visits at [place] - she travels to visits by ambulance and can bring her equipment with her\par She does not tolerate long periods off therapy\par She will continue oxygen 3 L/min 24 hours/day\par She will continue Symbicort, Spiriva, Singulair\par She will stop theophylline\par She will continue nebulizer therapy every 4 hours\par She will take a short course of Prednisone\par She will use Mucinex twice daily\par She will continue daily Zithromax\par She will hold a RX for Augmentin\par She was reminded to do a repeat chest CT= prescription was given\par We will monitor her PFTs\par She had 2020 flu vaccine\par She no longer smokes\par She agrees to PT\par Continued weight loss was advised\par She will follow-up with cardiology -cath planned\par \par DM\par Sugars running due to steroid use\par FSG's tid prior to meals\par Weight control\par ADA diet\par Increase metformin dosing\par Onglyza\par Ophtho and podiatry f/u\par Consider endocrine evaluation\par \par HTN\par She reports good BP control\par No recurrent AF or SVT\par Continue diltiazem, Eliquis, Lasix\par Close f/u of CR/BUN\par F/U with Dr. Chapa\par \par Hyperlipidemia\par Low fat diet\par Weight control\par On daily Crestor\par Monitor lipid profile\par \par She will return in follow-up in 2 weeks and as needed\par She will call if her status worsens \par She will call for any medical or pulmonary issues\par All of her questions were answered\par She and her brother verbally confirmed understanding of all of the above\par

## 2020-12-06 NOTE — REVIEW OF SYSTEMS
[Fatigue] : fatigue [Recent Change In Weight] : ~T recent weight change [Redness] : redness [Vision Problems] : vision problems [Postnasal Drip] : postnasal drip [Lower Ext Edema] : lower extremity edema [Shortness Of Breath] : shortness of breath [Dyspnea on Exertion] : dyspnea on exertion [Muscle Weakness] : muscle weakness [Muscle Pain] : muscle pain [Unsteady Walking] : ataxia [Anxiety] : anxiety [Easy Bruising] : easy bruising [Negative] : Heme/Lymph

## 2020-12-06 NOTE — PHYSICAL EXAM
[No Acute Distress] : no acute distress [Well Nourished] : well nourished [Well Developed] : well developed [Normal Voice/Communication] : normal voice/communication [Speech Grossly Normal] : speech grossly normal [Normal Affect] : the affect was normal [Alert and Oriented x3] : oriented to person, place, and time [70419 - Moderate Complexity requires multiple possible diagnoses and/or the management options, moderate complexity of the medical data (tests, etc.) to be reviewed, and moderate risk of significant complications, morbidity, and/or mortality as well as co] : Moderate Complexity [de-identified] : reddened right eye  [de-identified] : R=20; no cough; no audible stridor

## 2020-12-06 NOTE — HISTORY OF PRESENT ILLNESS
[Post-hospitalization from ___ Hospital] : Post-hospitalization from [unfilled] Hospital [Admitted on: ___] : The patient was admitted on [unfilled] [Discharged on ___] : discharged on [unfilled] [Discharge Summary] : discharge summary [Patient Contacted By: ____] : and contacted by [unfilled] [Home] : at home, [unfilled] , at the time of the visit. [Medical Office: ([facility])___] : at the medical office located in  [Family Member] : family member [Verbal consent obtained from patient] : the patient, [unfilled] [FreeTextEntry2] : See scanned discharge summary = reviewed and d/w patient in detail.\par \par Being evaluated at [place] for lung  transplant.\par Having cardiac cath there next week.\par LAB FLY coming to do labs and covid 19 testing at her home.\par Getting PT - not ambulating.\par Mild pedal edema. Denies calf pain.\par Moving bowels and passing urine well.\par Denies abdominal pain or n/v.\par Appetite fair.\par Sugars high.\par Denies chest pain.\par No increased cough.\par Denies fever or hemoptysis.\par Didn't use BIPAP yesterday due to trip to [place].\par Today tired and with labored breathing.\par Took 30 mg of Prednisone last night  on her own.\par Has bloodshot right eye.

## 2020-12-06 NOTE — HEALTH RISK ASSESSMENT
[Intercurrent ED visits] : went to ED [Intercurrent hospitalizations] : was admitted to the hospital  [No] : In the past 12 months have you used drugs other than those required for medical reasons? No [No falls in past year] : Patient reported no falls in the past year [(PHQ-2) Unable to screen] : PHQ-2: unable to screen [Patient declined mammogram] : Patient declined mammogram [Patient declined PAP Smear] : Patient declined PAP Smear [Patient declined bone density test] : Patient declined bone density test [Patient declined colonoscopy] : Patient declined colonoscopy [HIV test declined] : HIV test declined [Hepatitis C test declined] : Hepatitis C test declined [Behavioral] : behavioral [Alone] : lives alone [# of Members in Household ___] :  household currently consist of [unfilled] member(s) [Employed] : employed [High School] : high school [Single] : single [# Of Children ___] : has [unfilled] children [Feels Safe at Home] : Feels safe at home [Smoke Detector] : smoke detector [Carbon Monoxide Detector] : carbon monoxide detector [Seat Belt] :  uses seat belt [Caregiver Concerns] : has caregiver concerns [Reviewed no changes] : Reviewed no changes [Designated Healthcare Proxy] : Designated healthcare proxy [Name: ___] : Health Care Proxy's Name: [unfilled]  [Relationship: ___] : Relationship: [unfilled] [] : No [de-identified] : CTS, Cardio [de-identified] : PT [de-identified] : regular [UnableToScreenReason] : declined [Change in mental status noted] : No change in mental status noted [Sexually Active] : not sexually active [Reports changes in hearing] : Reports no changes in hearing [Reports changes in vision] : Reports no changes in vision [Reports changes in dental health] : Reports no changes in dental health [Guns at Home] : no guns at home [Sunscreen] : does not use sunscreen [Travel to Developing Areas] : does not  travel to developing areas [TB Exposure] : is not being exposed to tuberculosis [AdvancecareDate] : 12/20

## 2020-12-07 ENCOUNTER — TRANSCRIPTION ENCOUNTER (OUTPATIENT)
Age: 60
End: 2020-12-07

## 2020-12-08 ENCOUNTER — NON-APPOINTMENT (OUTPATIENT)
Age: 60
End: 2020-12-08

## 2020-12-08 ENCOUNTER — APPOINTMENT (OUTPATIENT)
Dept: INTERNAL MEDICINE | Facility: CLINIC | Age: 60
End: 2020-12-08
Payer: COMMERCIAL

## 2020-12-08 PROCEDURE — 99442: CPT

## 2020-12-08 NOTE — PROGRESS NOTE ADULT - ASSESSMENT
59 y/o F w/h/o controlled T2DM on Metformin 500mg bid. Also h/o CAD and COPD now on prednisone 15 mg daily. Pt w/glucose levels variable depending on PO intake and PO status as well as Prednisone doses. Pt lso with on and off nausea and also eating food from outside on and off. No hypoglycemia. New Afib so NPO today for monty/dccv. Hyperglycemic at time of visit after missing correction Humalog dose. Spoke to RN to administer Humalog correction dose while NPO and only hold prandial insulin due to NPO status. RN verbalized understanding. Will continue present insulin regimen for now due to recent changes on condition and PO status. Will re evaluate BG levels after procedure and make insulin dose changes as needed. No

## 2020-12-10 ENCOUNTER — NON-APPOINTMENT (OUTPATIENT)
Age: 60
End: 2020-12-10

## 2020-12-10 ENCOUNTER — APPOINTMENT (OUTPATIENT)
Dept: INTERNAL MEDICINE | Facility: CLINIC | Age: 60
End: 2020-12-10
Payer: COMMERCIAL

## 2020-12-10 PROCEDURE — 99442: CPT

## 2020-12-15 ENCOUNTER — NON-APPOINTMENT (OUTPATIENT)
Age: 60
End: 2020-12-15

## 2020-12-16 ENCOUNTER — APPOINTMENT (OUTPATIENT)
Dept: INTERNAL MEDICINE | Facility: CLINIC | Age: 60
End: 2020-12-16
Payer: COMMERCIAL

## 2020-12-16 ENCOUNTER — NON-APPOINTMENT (OUTPATIENT)
Age: 60
End: 2020-12-16

## 2020-12-16 PROCEDURE — 99441: CPT

## 2020-12-18 ENCOUNTER — NON-APPOINTMENT (OUTPATIENT)
Age: 60
End: 2020-12-18

## 2020-12-21 ENCOUNTER — NON-APPOINTMENT (OUTPATIENT)
Age: 60
End: 2020-12-21

## 2020-12-21 PROBLEM — Z01.419 ENCOUNTER FOR GYNECOLOGICAL EXAMINATION: Status: RESOLVED | Noted: 2019-10-17 | Resolved: 2020-12-21

## 2020-12-21 PROBLEM — J06.9 URI WITH COUGH AND CONGESTION: Status: RESOLVED | Noted: 2019-10-15 | Resolved: 2020-12-21

## 2020-12-29 ENCOUNTER — APPOINTMENT (OUTPATIENT)
Dept: INTERNAL MEDICINE | Facility: CLINIC | Age: 60
End: 2020-12-29
Payer: COMMERCIAL

## 2020-12-29 PROCEDURE — 99214 OFFICE O/P EST MOD 30 MIN: CPT | Mod: 95

## 2020-12-30 RX ORDER — SAXAGLIPTIN 5 MG/1
5 TABLET, FILM COATED ORAL
Qty: 90 | Refills: 1 | Status: DISCONTINUED | COMMUNITY
Start: 2020-07-06 | End: 2020-12-30

## 2020-12-30 RX ORDER — DILTIAZEM HYDROCHLORIDE 240 MG/1
240 CAPSULE, EXTENDED RELEASE ORAL
Qty: 90 | Refills: 0 | Status: DISCONTINUED | COMMUNITY
Start: 2020-01-08 | End: 2020-12-30

## 2020-12-30 NOTE — REVIEW OF SYSTEMS
[Recent Change In Weight] : ~T recent weight change [Redness] : redness [Vision Problems] : vision problems [Postnasal Drip] : postnasal drip [Shortness Of Breath] : shortness of breath [Dyspnea on Exertion] : dyspnea on exertion [Muscle Weakness] : muscle weakness [Muscle Pain] : muscle pain [Unsteady Walking] : ataxia [Anxiety] : anxiety [Easy Bruising] : easy bruising [Negative] : Heme/Lymph

## 2020-12-30 NOTE — HISTORY OF PRESENT ILLNESS
[Home] : at home, [unfilled] , at the time of the visit. [Medical Office: ([facility])___] : at the medical office located in  [Formal Caregiver] : formal caregiver [Verbal consent obtained from patient] : the patient, [unfilled] [FreeTextEntry8] : Continuing with lung transplantation evaluation at [facility].  Had to coronary artery stents placed recently.  Will be following up with cardiology there in early January.\par She complains of 2 main issues today.\par Since undergoing stent placement she feels that her breathing has progressively worsened.  She denies any edema or calf pain.  She denies any orthopnea or PND.  She denies any hemoptysis or fevers.  She denies any cough or sputum production.  She denies any new chest pain or palpitations.  She denies any wheezing but just feels that her breathing is very labored.  She is relying on BiPAP essentially 24 hours/day.  She has completed a course of steroid therapy.  Her other issue since then is that her sugars are low.  She had been using Metformin 1000 mg twice daily which she self reduced to 500 mg twice daily.  She is also using glimepiride 2 mg twice daily.  She reports that occasionally her fingersticks can be in the 70-90 range and that is why she reduced her diabetes regimen.

## 2020-12-30 NOTE — PHYSICAL EXAM
[Normal Voice/Communication] : normal voice/communication [Ill-Appearing] : ill-appearing [Speech Grossly Normal] : speech grossly normal [Normal Affect] : the affect was normal [Alert and Oriented x3] : oriented to person, place, and time [de-identified] : Wearing BiPAP and oxygen appears breathless and cushingoid [de-identified] : Respiratory rate = 20; no audible cough or stridor or wheezing heard

## 2020-12-30 NOTE — ASSESSMENT
[FreeTextEntry1] : Right conjunctivitis\par ? broken vessel\par Keep clean\par Apply warm compresses\par Eye drops prescribed\par Must see ophtho in f/u\par \par The patient has end-stage emphysema/she is awaiting lung transplantation\par She will continue BiPAP therapy\par She was advised to use her BIPAP at MD visits at [place] - she travels to visits by ambulance and can bring her equipment with her\par She does not tolerate long periods off therapy\par She will continue oxygen 3 L/min 24 hours/day\par She will continue Symbicort, Spiriva, Singulair\par She will continue nebulizer therapy every 4 hours\par She will take a short course of Prednisone 30 mg daily for 3-5 days\par She will use Mucinex twice daily\par She will continue daily Zithromax\par She will hold a RX for Augmentin\par She was reminded to do a repeat chest CT= prescription was given\par We will monitor her PFTs\par She had 2020 flu vaccine\par She no longer smokes\par She agrees to PT\par Continued weight loss was advised\par She will follow-up with cardiology and advise them of her increased SOB since stenting\par \par DM\par Sugars were running high due to steroid use\par FSG's tid prior to meals\par Weight control\par ADA diet\par Metformin 500 mg bid - may need to increase to 1000 mg bid when back on steroids\par Amaryl 2 mg bid\par Ophtho and podiatry f/u\par Consider endocrine evaluation\par \par HTN\par She reports good BP control\par No recurrent AF or SVT\par Continue diltiazem, Eliquis, Lasix\par Close f/u of CR/BUN\par F/U with Dr. Chapa\par \par Hyperlipidemia\par Low fat diet\par Weight control\par On daily Crestor\par Monitor lipid profile\par \par She will return in follow-up in 2 weeks and as needed\par She will call if her status worsens \par She will go to ER for worsening respiratory status\par She will call for any medical or pulmonary issues\par All of her questions were answered\par She verbally confirmed understanding of all of the above\par

## 2020-12-30 NOTE — HEALTH RISK ASSESSMENT
[] : No [No] : In the past 12 months have you used drugs other than those required for medical reasons? No [No falls in past year] : Patient reported no falls in the past year [(PHQ-2) Unable to screen] : PHQ-2: unable to screen [de-identified] : Cardiology [de-identified] : None [de-identified] : Regular

## 2021-01-07 ENCOUNTER — NON-APPOINTMENT (OUTPATIENT)
Age: 61
End: 2021-01-07

## 2021-01-08 ENCOUNTER — RX RENEWAL (OUTPATIENT)
Age: 61
End: 2021-01-08

## 2021-01-08 ENCOUNTER — NON-APPOINTMENT (OUTPATIENT)
Age: 61
End: 2021-01-08

## 2021-01-14 ENCOUNTER — APPOINTMENT (OUTPATIENT)
Dept: INTERNAL MEDICINE | Facility: CLINIC | Age: 61
End: 2021-01-14
Payer: COMMERCIAL

## 2021-01-14 DIAGNOSIS — Z86.39 PERSONAL HISTORY OF OTHER ENDOCRINE, NUTRITIONAL AND METABOLIC DISEASE: ICD-10-CM

## 2021-01-14 DIAGNOSIS — J44.1 CHRONIC OBSTRUCTIVE PULMONARY DISEASE WITH (ACUTE) EXACERBATION: ICD-10-CM

## 2021-01-14 DIAGNOSIS — Z86.79 PERSONAL HISTORY OF OTHER DISEASES OF THE CIRCULATORY SYSTEM: ICD-10-CM

## 2021-01-14 PROCEDURE — 99214 OFFICE O/P EST MOD 30 MIN: CPT | Mod: 95

## 2021-01-16 PROBLEM — Z86.39 HISTORY OF UNCONTROLLED DIABETES: Status: RESOLVED | Noted: 2020-12-10 | Resolved: 2021-01-16

## 2021-01-16 PROBLEM — J44.1 COPD EXACERBATION: Status: RESOLVED | Noted: 2020-05-04 | Resolved: 2021-01-16

## 2021-01-16 PROBLEM — Z86.79 HISTORY OF ATRIAL TACHYCARDIA: Status: RESOLVED | Noted: 2019-12-11 | Resolved: 2021-01-16

## 2021-01-16 NOTE — ASSESSMENT
[FreeTextEntry1] : The patient has end-stage emphysema/she is awaiting lung transplantation\par She complains of labored breathing\par She adamantly refuses to go to the ER for evaluation\par She is adamant about resuming a course of steroids\par She will continue BiPAP therapy\par She was advised to use her BIPAP at MD visits at [place] - she travels to visits by ambulance and can bring her equipment with her\par She does not tolerate long periods off therapy\par She will continue oxygen 3 L/min 24 hours/day\par She will continue Symbicort, Spiriva, Singulair\par She will continue nebulizer therapy every 4 hours\par She will take Prednisone 40 mg daily and taper by 5 mg every 3-5 days\par She will use Mucinex twice daily\par She will continue daily Zithromax\par She will hold a RX for Augmentin\par She was reminded to do a repeat chest CT= prescription was given\par We will monitor her PFTs\par She had 2020 flu vaccine\par She no longer smokes\par She agrees to PT\par Continued weight loss was advised\par She will follow-up with cardiology and advise them of her increased SOB\par F/U with Dr. [facility] at [facility]\par \par DM\par Sugars in good range \par FSG's tid prior to meals\par Weight control\par ADA diet\par Metformin 500 mg bid - may need to increase to 1000 mg bid when back on steroids\par Amaryl 2 mg bid - may need to increase to 4 mg bid when back on steroids\par Also on Jardiance 10 mg\par Ophtho and podiatry f/u\par Consider endocrine evaluation\par \par HTN\par She reports good BP control with addition of losartan\par No recurrent AF or SVT\par Continue diltiazem, Eliquis, Lasix\par Close f/u of CR/BUN\par F/U with Cardiology\par \par Hyperlipidemia\par Low fat diet\par Weight control\par On daily Crestor\par Monitor lipid profile\par \par Venous stasis of LE\par On diuretics\par Elevate\par Low salt diet\par Compression stockings\par Must see vascular\par \par She will do a TEB visit in follow-up in 1-2 weeks and as needed\par She will call if her status worsens \par She will go to ER for worsening respiratory status\par She will call for any medical or pulmonary issues\par All of her questions were answered\par She verbally confirmed understanding of all of the above\par F/U labs via [facility]\par

## 2021-01-16 NOTE — REVIEW OF SYSTEMS
[Vision Problems] : vision problems [Postnasal Drip] : postnasal drip [Shortness Of Breath] : shortness of breath [Dyspnea on Exertion] : dyspnea on exertion [Muscle Weakness] : muscle weakness [Muscle Pain] : muscle pain [Anxiety] : anxiety [Easy Bruising] : easy bruising [Negative] : Heme/Lymph

## 2021-01-16 NOTE — HEALTH RISK ASSESSMENT
[] : No [No] : In the past 12 months have you used drugs other than those required for medical reasons? No [No falls in past year] : Patient reported no falls in the past year [(PHQ-2) Unable to screen] : PHQ-2: unable to screen [de-identified] : Cardiology [de-identified] : None [de-identified] : Regular

## 2021-01-16 NOTE — HISTORY OF PRESENT ILLNESS
[FreeTextEntry8] : Continuing with lung transplantation evaluation at [facility].  Had two coronary artery stents placed recently by cardiology there. Had losartan and Jardiance added to regimen by cardiology. Will be following up with cardiology and Aleksandra Charles / [facility] there in 2/2021.\par She complains of 2 main issues today.\par Since completing steroid therapy she feels that her breathing has progressively worsened.  She reports good pulse and BP and oxygen saturation readings.She denies any edema or calf pain.  She denies any orthopnea or PND.  She denies any hemoptysis or fevers.  She denies any cough or sputum production.  She denies any new chest pain or palpitations.  She denies any wheezing but just feels that her breathing is very labored.  She is relying on BiPAP essentially 24 hours/day.  She refuses to even consider going to the ER for any type of evaluation and is adamant about restarting a course of steroids.  \par Her FSG's have been in good range. Her other concern is that her sugars will become uncontrolled again if she restarts prednisone.  She had been using Metformin 500 mg twice daily.  She is also using glimepiride 2 mg twice daily. [Home] : at home, [unfilled] , at the time of the visit. [Medical Office: ([facility])___] : at the medical office located in  [Family Member] : family member [Formal Caregiver] : formal caregiver [Verbal consent obtained from patient] : the patient, [unfilled]

## 2021-01-16 NOTE — PHYSICAL EXAM
[Normal Voice/Communication] : normal voice/communication [Ill-Appearing] : ill-appearing [Speech Grossly Normal] : speech grossly normal [Normal Affect] : the affect was normal [Alert and Oriented x3] : oriented to person, place, and time [de-identified] : Wearing BiPAP and oxygen appears breathless and cushingoid [de-identified] : Respiratory rate = 20; no audible cough or stridor or wheezing heard

## 2021-01-18 ENCOUNTER — RX RENEWAL (OUTPATIENT)
Age: 61
End: 2021-01-18

## 2021-02-03 ENCOUNTER — RX RENEWAL (OUTPATIENT)
Age: 61
End: 2021-02-03

## 2021-02-16 ENCOUNTER — APPOINTMENT (OUTPATIENT)
Dept: INTERNAL MEDICINE | Facility: CLINIC | Age: 61
End: 2021-02-16
Payer: COMMERCIAL

## 2021-02-16 PROCEDURE — 99441: CPT

## 2021-02-17 DIAGNOSIS — F41.8 OTHER SPECIFIED ANXIETY DISORDERS: ICD-10-CM

## 2021-02-20 NOTE — OCCUPATIONAL THERAPY INITIAL EVALUATION ADULT - RANGE OF MOTION EXAMINATION, UPPER EXTREMITY
Patient BIBA from home. states she got the first dose of moderna vaccine yesterday. Patient s/p fall early this morning, denies LOC, denies head injury. Patient complains of weakness.
bilateral UE Active ROM was WNL (within normal limits)

## 2021-02-25 ENCOUNTER — APPOINTMENT (OUTPATIENT)
Dept: INTERNAL MEDICINE | Facility: CLINIC | Age: 61
End: 2021-02-25
Payer: COMMERCIAL

## 2021-02-25 ENCOUNTER — NON-APPOINTMENT (OUTPATIENT)
Age: 61
End: 2021-02-25

## 2021-02-25 PROCEDURE — 99441: CPT

## 2021-03-09 ENCOUNTER — NON-APPOINTMENT (OUTPATIENT)
Age: 61
End: 2021-03-09

## 2021-03-16 ENCOUNTER — NON-APPOINTMENT (OUTPATIENT)
Age: 61
End: 2021-03-16

## 2021-03-17 ENCOUNTER — APPOINTMENT (OUTPATIENT)
Dept: INTERNAL MEDICINE | Facility: CLINIC | Age: 61
End: 2021-03-17
Payer: COMMERCIAL

## 2021-03-17 DIAGNOSIS — R60.0 LOCALIZED EDEMA: ICD-10-CM

## 2021-03-17 PROCEDURE — 99495 TRANSJ CARE MGMT MOD F2F 14D: CPT | Mod: 95

## 2021-03-17 PROCEDURE — 99496 TRANSJ CARE MGMT HIGH F2F 7D: CPT | Mod: 95

## 2021-03-17 NOTE — REVIEW OF SYSTEMS
[Recent Change In Weight] : ~T recent weight change [Vision Problems] : vision problems [Postnasal Drip] : postnasal drip [Leg Claudication] : leg claudication [Lower Ext Edema] : lower extremity edema [Shortness Of Breath] : shortness of breath [Cough] : cough [Dyspnea on Exertion] : dyspnea on exertion [Heartburn] : heartburn [Joint Pain] : joint pain [Muscle Weakness] : muscle weakness [Muscle Pain] : muscle pain [Back Pain] : back pain [Anxiety] : anxiety [Easy Bruising] : easy bruising [Negative] : Heme/Lymph

## 2021-03-17 NOTE — HEALTH RISK ASSESSMENT
[Intercurrent ED visits] : went to ED [Intercurrent hospitalizations] : was admitted to the hospital  [No] : In the past 12 months have you used drugs other than those required for medical reasons? No [No falls in past year] : Patient reported no falls in the past year [(PHQ-2) Unable to screen] : PHQ-2: unable to screen [Patient declined mammogram] : Patient declined mammogram [Patient declined PAP Smear] : Patient declined PAP Smear [Patient declined bone density test] : Patient declined bone density test [Patient declined colonoscopy] : Patient declined colonoscopy [HIV test declined] : HIV test declined [Hepatitis C test declined] : Hepatitis C test declined [Behavioral] : behavioral [Transportation] : transportation [Alone] : lives alone [# of Members in Household ___] :  household currently consist of [unfilled] member(s) [Employed] : employed [High School] : high school [Single] : single [# Of Children ___] : has [unfilled] children [Feels Safe at Home] : Feels safe at home [Smoke Detector] : smoke detector [Carbon Monoxide Detector] : carbon monoxide detector [Seat Belt] :  uses seat belt [Reviewed no changes] : Reviewed no changes [Designated Healthcare Proxy] : Designated healthcare proxy [Name: ___] : Health Care Proxy's Name: [unfilled]  [Relationship: ___] : Relationship: [unfilled] [] : No [de-identified] : Pulmonary [de-identified] : Bedbound/will be starting physical therapy at home [de-identified] : Regular [Change in mental status noted] : No change in mental status noted [Sexually Active] : not sexually active [Reports changes in hearing] : Reports no changes in hearing [Reports changes in vision] : Reports no changes in vision [Reports changes in dental health] : Reports no changes in dental health [Guns at Home] : no guns at home [Sunscreen] : does not use sunscreen [Travel to Developing Areas] : does not  travel to developing areas [TB Exposure] : is not being exposed to tuberculosis [Caregiver Concerns] : does not have caregiver concerns [de-identified] : With assistance [de-identified] : With assistance [AdvancecareDate] : 03/21 [FreeTextEntry4] : I discussed advance care planning and healthcare proxy with the patient in detail during her visit.

## 2021-03-17 NOTE — HISTORY OF PRESENT ILLNESS
[Home] : at home, [unfilled] , at the time of the visit. [Medical Office: ([facility])___] : at the medical office located in  [Family Member] : family member [Verbal consent obtained from patient] : the patient, [unfilled] [Post-hospitalization from ___ Hospital] : Post-hospitalization from [unfilled] Hospital [Admitted on: ___] : The patient was admitted on [unfilled] [Discharged on ___] : discharged on [unfilled] [Discharge Summary] : discharge summary [Discharge Med List] : discharge medication list [Med Reconciliation] : medication reconciliation has been completed [Patient Contacted By: ____] : and contacted by [unfilled] [FreeTextEntry2] : See scanned discharge summary report/discharge medication list.\par Reviewed and discussed in detail with the patient.\par She has been rejected for lung transplantation by [facility] and now by [facility].  She is doing poorly.  She states that she knows that she is dying but that she is not ready to give up yet.  She wants to keep going and living.\par She claims that she had a 20 pound weight gain while hospitalized and that she has significant edema.  She is on fluid restriction and is using Lasix 40 mg twice daily along with potassium.  She is having good urine output she reports 3 L of urine output yesterday morning and 1100 cc of urine output this morning.  Her edema is slightly improved.  She is monitoring her weight.  She has difficulty ambulating due to her edema and requires assistance getting to the bathroom.  She denies any abdominal pain or any nausea/vomiting.  Her appetite is poor.  She denies any chest pain.  She complains of chest congestion and feels mucus rattling in her chest which she finds difficult to clear.  Her secretions are not discolored.  She denies any hemoptysis or fevers.  Her breathing remains poor and she is using BiPAP 24 hours/day along with oxygen.  She complains of nasal dryness.  She does not wish to use Mucinex because she feels that it dries her nose further and triggers epistaxis.

## 2021-03-17 NOTE — ASSESSMENT
[FreeTextEntry1] : Lower extremity edema and weight gain\par Likely due to cor pulmonale/fluid overload\par On fluid restriction\par On Lasix twice daily with potassium supplementation\par Daily weights to be monitored\par \par The patient has end-stage emphysema\par Lung transplantation has been declined by [facility] and [facility]\par She will continue BiPAP therapy\par She will continue oxygen 3 L/min 24 hours/day\par She will continue Symbicort, Spiriva, Singulair\par She will continue nebulizer therapy every 4-6 hours\par She is presently on prednisone 10 mg daily\par She will obtain an Acapella device to assist with secretion clearance\par If this is ineffective then we will order a smart vest to assist with secretion clearance\par She has discontinued Mucinex\par She will add Mucomyst 10% solution 2 cc to her nebulizer therapy twice daily\par She will continue Zithromax 3 times weekly\par She declines further chest CT/PFTs\par She had 2020 flu vaccine\par She no longer smokes\par She agrees to PT\par Cardiology follow-up\par \par DM\par FSG's tid prior to meals\par Weight control\par ADA diet\par Continue present meds\par Ophtho and podiatry f/u if possible\par Endocrine follow-up declined\par \par HTN\par She reports good BP control\par No recurrent AF or SVT\par Continue diltiazem, Eliquis, Lasix\par Close f/u of CR/BUN\par Cardiology follow-up\par \par Hyperlipidemia\par Low fat diet\par Weight control\par On daily Crestor\par Monitor lipid profile\par \par She will return in follow-up in 2 weeks and as needed\par She will call if her status worsens \par She will call for any medical or pulmonary issues\par All of her questions were answered\par She and her brother verbally confirmed understanding of all of the above\par We will arrange a home blood draw for routine labs on the patient

## 2021-03-17 NOTE — PHYSICAL EXAM
[Normal Voice/Communication] : normal voice/communication [Ill-Appearing] : ill-appearing [Speech Grossly Normal] : speech grossly normal [Normal Affect] : the affect was normal [Alert and Oriented x3] : oriented to person, place, and time [25542 - High Complexity requires an extensive number of possible diagnoses and/or the management options, extensive complexity of the medical data (tests, etc.) to be reviewed, and a high risk of significant complications, morbidity, and/or mortality as w] : High Complexity  [de-identified] : Wearing BiPAP and oxygen appears breathless and cushingoid [de-identified] : Respiratory rate = 20; no audible cough or stridor or wheezing heard

## 2021-03-18 ENCOUNTER — NON-APPOINTMENT (OUTPATIENT)
Age: 61
End: 2021-03-18

## 2021-03-18 RX ORDER — ACETYLCYSTEINE 100 MG/ML
10 SOLUTION ORAL; RESPIRATORY (INHALATION) TWICE DAILY
Qty: 3 | Refills: 5 | Status: ACTIVE | COMMUNITY
Start: 2021-03-17 | End: 1900-01-01

## 2021-03-22 ENCOUNTER — NON-APPOINTMENT (OUTPATIENT)
Age: 61
End: 2021-03-22

## 2021-03-22 ENCOUNTER — LABORATORY RESULT (OUTPATIENT)
Age: 61
End: 2021-03-22

## 2021-03-23 ENCOUNTER — NON-APPOINTMENT (OUTPATIENT)
Age: 61
End: 2021-03-23

## 2021-03-24 LAB
25(OH)D3 SERPL-MCNC: 61.4 NG/ML
ALBUMIN SERPL ELPH-MCNC: 3.7 G/DL
ALP BLD-CCNC: 47 U/L
ALT SERPL-CCNC: 29 U/L
ANION GAP SERPL CALC-SCNC: 13 MMOL/L
AST SERPL-CCNC: 36 U/L
BASOPHILS # BLD AUTO: 0.01 K/UL
BASOPHILS NFR BLD AUTO: 0.2 %
BILIRUB SERPL-MCNC: 0.2 MG/DL
BUN SERPL-MCNC: 25 MG/DL
CALCIUM SERPL-MCNC: 8.5 MG/DL
CHLORIDE SERPL-SCNC: 91 MMOL/L
CHOLEST SERPL-MCNC: 156 MG/DL
CO2 SERPL-SCNC: 33 MMOL/L
COVID-19 NUCLEOCAPSID  GAM ANTIBODY INTERPRETATION: NEGATIVE
COVID-19 SPIKE DOMAIN ANTIBODY INTERPRETATION: NEGATIVE
CREAT SERPL-MCNC: 1.37 MG/DL
EOSINOPHIL # BLD AUTO: 0 K/UL
EOSINOPHIL NFR BLD AUTO: 0 %
ESTIMATED AVERAGE GLUCOSE: 174 MG/DL
FOLATE SERPL-MCNC: >20 NG/ML
GLUCOSE SERPL-MCNC: 125 MG/DL
HBA1C MFR BLD HPLC: 7.7 %
HCT VFR BLD CALC: 29.1 %
HDLC SERPL-MCNC: 34 MG/DL
HGB BLD-MCNC: 8.3 G/DL
IMM GRANULOCYTES NFR BLD AUTO: 1.6 %
IRON SERPL-MCNC: 23 UG/DL
LDLC SERPL CALC-MCNC: 87 MG/DL
LYMPHOCYTES # BLD AUTO: 0.81 K/UL
LYMPHOCYTES NFR BLD AUTO: 14.1 %
MAGNESIUM SERPL-MCNC: 2.3 MG/DL
MAN DIFF?: NORMAL
MCHC RBC-ENTMCNC: 24 PG
MCHC RBC-ENTMCNC: 28.5 GM/DL
MCV RBC AUTO: 84.1 FL
MONOCYTES # BLD AUTO: 0.44 K/UL
MONOCYTES NFR BLD AUTO: 7.7 %
NEUTROPHILS # BLD AUTO: 4.39 K/UL
NEUTROPHILS NFR BLD AUTO: 76.4 %
NONHDLC SERPL-MCNC: 122 MG/DL
PLATELET # BLD AUTO: 266 K/UL
POTASSIUM SERPL-SCNC: 4.6 MMOL/L
PROT SERPL-MCNC: 5.8 G/DL
RBC # BLD: 3.46 M/UL
RBC # FLD: 17.8 %
SARS-COV-2 AB SERPL IA-ACNC: <3.8 AU/ML
SARS-COV-2 AB SERPL QL IA: 0.47 INDEX
SODIUM SERPL-SCNC: 137 MMOL/L
TRIGL SERPL-MCNC: 173 MG/DL
TSH SERPL-ACNC: 1.69 UIU/ML
VIT B12 SERPL-MCNC: 731 PG/ML
WBC # FLD AUTO: 5.74 K/UL

## 2021-04-05 ENCOUNTER — NON-APPOINTMENT (OUTPATIENT)
Age: 61
End: 2021-04-05

## 2021-04-06 ENCOUNTER — RX CHANGE (OUTPATIENT)
Age: 61
End: 2021-04-06

## 2021-04-07 ENCOUNTER — APPOINTMENT (OUTPATIENT)
Dept: INTERNAL MEDICINE | Facility: CLINIC | Age: 61
End: 2021-04-07
Payer: COMMERCIAL

## 2021-04-07 ENCOUNTER — NON-APPOINTMENT (OUTPATIENT)
Age: 61
End: 2021-04-07

## 2021-04-07 PROCEDURE — 99496 TRANSJ CARE MGMT HIGH F2F 7D: CPT | Mod: 95

## 2021-04-07 NOTE — PHYSICAL EXAM
[No Acute Distress] : no acute distress [Well Nourished] : well nourished [Ill-Appearing] : ill-appearing [Alert and Oriented x3] : oriented to person, place, and time [de-identified] : Lying in bed; wearing BiPAP [de-identified] : Tachypneic; no audible stridor or wheezing noted [16979 - High Complexity requires an extensive number of possible diagnoses and/or the management options, extensive complexity of the medical data (tests, etc.) to be reviewed, and a high risk of significant complications, morbidity, and/or mortality as w] : High Complexity

## 2021-04-07 NOTE — ASSESSMENT
[FreeTextEntry1] : Recent Covid infection/pneumonia\par Has completed course of antibiotics\par Will add prednisone 20 mg daily for 1 week\par Monitor fever curve\par Monitor oxygen saturations–if severely short of breath or with persistently poor saturations to return to ER\par Has completed quarantine\par Will monitor COVID-19 nasal swab PCR\par Will try to arrange for home portable chest x-ray to follow-up pneumonia\par \par The patient has end-stage emphysema\par Lung transplantation has been declined by [facility]\par She will continue BiPAP therapy 24 hours/day\par She will continue oxygen 24 hours/day\par She will continue Symbicort, Spiriva, Singulair\par She will continue nebulizer therapy every 4-6 hours\par She will use an Acapella device to assist with secretion clearance\par If this is ineffective then we will order a smart vest to assist with secretion clearance\par Mucinex bid or\par She will add Mucomyst 10% solution 2 cc to her nebulizer therapy twice daily\par She will continue Zithromax 3 times weekly\par She declines further chest CT/PFTs\par She had 2020 flu vaccine and Covid vaccine\par She no longer smokes\par PT as tolerated\par Cardiology follow-up\par \par DM\par FSG's tid prior to meals\par Weight control\par ADA diet\par Continue present meds = Jardiance/Metformin/glimepiride\par Ophtho and podiatry declined\par Endocrine follow-up declined\par \par HTN\par She reports good BP control\par No recurrent AF or SVT\par Continue diltiazem, Eliquis, Lasix\par Close f/u of CR/BUN\par Cardiology follow-up\par \par Hyperlipidemia\par Low fat diet\par Weight control\par On daily Crestor\par Monitor lipid profile\par \par Crackling in ears–suspect ETD\par Flonase 1 spray per nostril daily to twice daily\par Decongestant as needed\par \par Poor appetite\par Liberalize diet\par Daily MVI\par Soft foods\par Glucerna shakes\par Protein shakes\par \par \par She will do a TEB visit in 1 week and as needed\par She will call if her status worsens \par She will call for any medical or pulmonary issues\par All of her questions were answered\par She and her brother verbally confirmed understanding of all of the above\par We will arrange a home blood draw for routine labs on the patient

## 2021-04-07 NOTE — REVIEW OF SYSTEMS
[Vision Problems] : vision problems [Postnasal Drip] : postnasal drip [Leg Claudication] : leg claudication [Lower Ext Edema] : lower extremity edema [Shortness Of Breath] : shortness of breath [Dyspnea on Exertion] : dyspnea on exertion [Heartburn] : heartburn [Joint Pain] : joint pain [Muscle Weakness] : muscle weakness [Muscle Pain] : muscle pain [Back Pain] : back pain [Anxiety] : anxiety [Easy Bruising] : easy bruising [Negative] : Heme/Lymph

## 2021-04-07 NOTE — HISTORY OF PRESENT ILLNESS
[Home] : at home, [unfilled] , at the time of the visit. [Medical Office: ([facility])___] : at the medical office located in  [Family Member] : family member [Verbal consent obtained from patient] : the patient, [unfilled] [Post-hospitalization from ___ Hospital] : Post-hospitalization from [unfilled] Hospital [Admitted on: ___] : The patient was admitted on [unfilled] [Discharged on ___] : discharged on [unfilled] [Discharge Summary] : discharge summary [Discharge Med List] : discharge medication list [Med Reconciliation] : medication reconciliation has been completed [Patient Contacted By: ____] : and contacted by [unfilled] [FreeTextEntry2] : See scanned discharge summary and medication list.\par Records reviewed and discussed with patient/brother Michele.\par Seen by VNS yesterday.\par Afebrile with stable VS as per brother.\par Essentially bedbound and unable to cooperate with PT.\par Has poor appetite.  Did take in a small amount of food yesterday evening.  Fingersticks have been in acceptable range.\par Denies any chest pain.  Denies any cough or hemoptysis.  Using BiPAP 24 hours/day.  Remains short of breath.\par Only other new complaint is of crackling in her ears.

## 2021-04-07 NOTE — HEALTH RISK ASSESSMENT
[Intercurrent ED visits] : went to ED [Intercurrent hospitalizations] : was admitted to the hospital  [] : No [No] : In the past 12 months have you used drugs other than those required for medical reasons? No [No falls in past year] : Patient reported no falls in the past year [(PHQ-2) Unable to screen] : PHQ-2: unable to screen [de-identified] : None [de-identified] : Soft regular diet [UnableToScreenReason] : Patient too ill

## 2021-04-08 ENCOUNTER — RX RENEWAL (OUTPATIENT)
Age: 61
End: 2021-04-08

## 2021-04-09 ENCOUNTER — RX RENEWAL (OUTPATIENT)
Age: 61
End: 2021-04-09

## 2021-04-13 ENCOUNTER — NON-APPOINTMENT (OUTPATIENT)
Age: 61
End: 2021-04-13

## 2021-04-14 ENCOUNTER — NON-APPOINTMENT (OUTPATIENT)
Age: 61
End: 2021-04-14

## 2021-04-15 ENCOUNTER — NON-APPOINTMENT (OUTPATIENT)
Age: 61
End: 2021-04-15

## 2021-04-15 ENCOUNTER — APPOINTMENT (OUTPATIENT)
Dept: INTERNAL MEDICINE | Facility: CLINIC | Age: 61
End: 2021-04-15
Payer: COMMERCIAL

## 2021-04-15 DIAGNOSIS — U07.1 COVID-19: ICD-10-CM

## 2021-04-15 PROCEDURE — 99214 OFFICE O/P EST MOD 30 MIN: CPT | Mod: 95

## 2021-04-17 PROBLEM — U07.1 COVID-19 VIRUS INFECTION: Status: ACTIVE | Noted: 2021-04-07

## 2021-04-17 NOTE — ASSESSMENT
[FreeTextEntry1] : Recent Covid infection/pneumonia\par Has completed course of antibiotics\par Monitor fever curve\par Monitor oxygen saturations–if severely short of breath or with persistently poor saturations to return to ER\par Has completed quarantine\par Will monitor COVID-19 nasal swab PCR\par Will try to arrange for home portable chest x-ray to follow-up pneumonia\par \par The patient has end-stage emphysema with progressive respiratory failure\par Lung transplantation has been declined by [place] and [facility]\par She will continue BiPAP therapy 24 hours/day\par She will continue oxygen 24 hours/day can increase liter flow and to use oxygen via cylinder/tank\par She will continue Symbicort, Spiriva, Singulair\par She will continue nebulizer therapy every 4-6 hours\par She will use an Acapella device to assist with secretion clearance\par If this is ineffective then we will order a smart vest to assist with secretion clearance\par Mucinex bid or\par She will add Mucomyst 10% solution 2 cc to her nebulizer therapy twice daily\par She will continue Zithromax 3 times weekly\par She declines further chest CT/PFTs\par She had 2020 flu vaccine and Covid vaccine\par She no longer smokes\par PT as tolerated\par Cardiology follow-up\par \par DM\par FSG's tid prior to meals\par Weight control\par ADA diet\par Continue present meds = Jardiance/Metformin/glimepiride\par Ophtho and podiatry declined\par Endocrine follow-up declined\par \par HTN\par She reports good BP control\par No recurrent AF or SVT\par Continue diltiazem, Eliquis, Lasix\par Close f/u of CR/BUN\par Cardiology follow-up\par \par Hyperlipidemia\par Low fat diet\par Weight control\par On daily Crestor\par Monitor lipid profile\par \par Poor appetite\par Liberalize diet\par Daily MVI\par Soft foods\par Glucerna shakes\par Protein shakes\par \par \par She will do a TEB visit in 1 week and as needed\par She will call if her status worsens \par She will call for any medical or pulmonary issues\par All of her questions were answered\par She and her brother verbally confirmed understanding of all of the above\par We will arrange a home blood draw for routine labs on the patient\par Will try to enter patient into [facility] - home care program\par Info for local agency to provide NP to see patient in the home provided to Michele\haley Requested RX's renewed

## 2021-04-17 NOTE — PHYSICAL EXAM
[No Acute Distress] : no acute distress [Ill-Appearing] : ill-appearing [Alert and Oriented x3] : oriented to person, place, and time [de-identified] : Lying in bed; wearing BiPAP [de-identified] : Tachypneic; no audible stridor or wheezing noted

## 2021-04-17 NOTE — CURRENT MEDS
[Takes medication as prescribed] : takes [None] : Patient does not have any barriers to medication adherence [Yes] : Reviewed medication list for presence of high-risk medications. [Benzodiazepines] : benzodiazepines [Blood Thinners] : blood thinners

## 2021-04-17 NOTE — HEALTH RISK ASSESSMENT
[No] : In the past 12 months have you used drugs other than those required for medical reasons? No [No falls in past year] : Patient reported no falls in the past year [(PHQ-2) Unable to screen] : PHQ-2: unable to screen [] : No [de-identified] : None [de-identified] : Soft regular diet [UnableToScreenReason] : Patient too ill

## 2021-04-19 LAB
25(OH)D3 SERPL-MCNC: 63.1 NG/ML
ALBUMIN SERPL ELPH-MCNC: 3.7 G/DL
ALP BLD-CCNC: 62 U/L
ALT SERPL-CCNC: 25 U/L
ANION GAP SERPL CALC-SCNC: 11 MMOL/L
AST SERPL-CCNC: 25 U/L
BASOPHILS # BLD AUTO: 0.05 K/UL
BASOPHILS NFR BLD AUTO: 0.4 %
BILIRUB SERPL-MCNC: 0.5 MG/DL
BUN SERPL-MCNC: 32 MG/DL
CALCIUM SERPL-MCNC: 9.1 MG/DL
CHLORIDE SERPL-SCNC: 93 MMOL/L
CHOLEST SERPL-MCNC: 120 MG/DL
CO2 SERPL-SCNC: 33 MMOL/L
CREAT SERPL-MCNC: 2.19 MG/DL
EOSINOPHIL # BLD AUTO: 0.11 K/UL
EOSINOPHIL NFR BLD AUTO: 0.8 %
ESTIMATED AVERAGE GLUCOSE: 154 MG/DL
FOLATE SERPL-MCNC: 15 NG/ML
GLUCOSE SERPL-MCNC: 131 MG/DL
HBA1C MFR BLD HPLC: 7 %
HCT VFR BLD CALC: 33.7 %
HDLC SERPL-MCNC: 53 MG/DL
HGB BLD-MCNC: 9.9 G/DL
IMM GRANULOCYTES NFR BLD AUTO: 3.5 %
IRON SERPL-MCNC: 44 UG/DL
LDLC SERPL CALC-MCNC: 43 MG/DL
LYMPHOCYTES # BLD AUTO: 1.98 K/UL
LYMPHOCYTES NFR BLD AUTO: 15.2 %
MAN DIFF?: NORMAL
MCHC RBC-ENTMCNC: 24.8 PG
MCHC RBC-ENTMCNC: 29.4 GM/DL
MCV RBC AUTO: 84.3 FL
MONOCYTES # BLD AUTO: 0.75 K/UL
MONOCYTES NFR BLD AUTO: 5.8 %
NEUTROPHILS # BLD AUTO: 9.64 K/UL
NEUTROPHILS NFR BLD AUTO: 74.3 %
NONHDLC SERPL-MCNC: 67 MG/DL
PLATELET # BLD AUTO: 297 K/UL
POTASSIUM SERPL-SCNC: 4.8 MMOL/L
PROT SERPL-MCNC: 5.6 G/DL
RBC # BLD: 4 M/UL
RBC # FLD: 19.6 %
SODIUM SERPL-SCNC: 137 MMOL/L
TRIGL SERPL-MCNC: 123 MG/DL
TSH SERPL-ACNC: 1.76 UIU/ML
VIT B12 SERPL-MCNC: 1569 PG/ML
WBC # FLD AUTO: 12.99 K/UL

## 2021-04-21 ENCOUNTER — NON-APPOINTMENT (OUTPATIENT)
Age: 61
End: 2021-04-21

## 2021-04-23 ENCOUNTER — NON-APPOINTMENT (OUTPATIENT)
Age: 61
End: 2021-04-23

## 2021-04-27 LAB
ANION GAP SERPL CALC-SCNC: 11 MMOL/L
BUN SERPL-MCNC: 18 MG/DL
CALCIUM SERPL-MCNC: 9.1 MG/DL
CHLORIDE SERPL-SCNC: 98 MMOL/L
CO2 SERPL-SCNC: 32 MMOL/L
CREAT SERPL-MCNC: 1.19 MG/DL
GLUCOSE SERPL-MCNC: 82 MG/DL
POTASSIUM SERPL-SCNC: 4.4 MMOL/L
SODIUM SERPL-SCNC: 141 MMOL/L

## 2021-04-29 ENCOUNTER — RX RENEWAL (OUTPATIENT)
Age: 61
End: 2021-04-29

## 2021-04-30 ENCOUNTER — RX CHANGE (OUTPATIENT)
Age: 61
End: 2021-04-30

## 2021-04-30 RX ORDER — FLUTICASONE PROPIONATE 50 UG/1
50 SPRAY, METERED NASAL TWICE DAILY
Qty: 16 | Refills: 0 | Status: DISCONTINUED | COMMUNITY
Start: 2021-04-07 | End: 2021-04-30

## 2021-05-08 ENCOUNTER — TRANSCRIPTION ENCOUNTER (OUTPATIENT)
Age: 61
End: 2021-05-08

## 2021-05-11 ENCOUNTER — APPOINTMENT (OUTPATIENT)
Dept: INTERNAL MEDICINE | Facility: CLINIC | Age: 61
End: 2021-05-11
Payer: COMMERCIAL

## 2021-05-11 DIAGNOSIS — M25.561 PAIN IN RIGHT KNEE: ICD-10-CM

## 2021-05-11 DIAGNOSIS — G89.29 PAIN IN RIGHT KNEE: ICD-10-CM

## 2021-05-11 DIAGNOSIS — M25.562 PAIN IN RIGHT KNEE: ICD-10-CM

## 2021-05-11 DIAGNOSIS — H10.9 UNSPECIFIED CONJUNCTIVITIS: ICD-10-CM

## 2021-05-11 PROCEDURE — 99214 OFFICE O/P EST MOD 30 MIN: CPT | Mod: 95

## 2021-05-11 NOTE — ASSESSMENT
[FreeTextEntry1] : Right eye with probable conjunctival hemorrhage\par Cannot exclude conjunctivitis\par Apply warm soaks\par Continue ofloxacin eyedrops\par Ophthalmology follow-up\par \par Bilateral knee pain\par ?  OA\par Advised to have physical therapist address\par Apply heat\par Apply topical therapy such as diclofenac gel\par Tylenol as needed for pain\par \par Bilateral  nasal congestion/ETD\par Likely related to chronic sinus disease with superimposed nasal drying from constant BiPAP therapy\par Continue humidifier\par Apply warm compresses to sinuses\par Steam therapy\par Saline nasal/sinus rinses 3 times daily\par Hold Zithromax\par Will give short course of Ceftin twice daily for 7 to 10 days\par Continue Flonase\par She refuses Mucinex or antihistamine therapy\par ENT follow-up\par \par The patient has end-stage emphysema with progressive respiratory failure\par Lung transplantation has been declined by [place] and [facility]\par She will continue BiPAP therapy 24 hours/day\par She will continue oxygen 24 hours/day can increase liter flow and to use oxygen via cylinder/tank\par She will continue Symbicort, Spiriva, Singulair\par She will continue nebulizer therapy every 4-6 hours\par She will use an Acapella device to assist with secretion clearance\par If this is ineffective then we will order a smart vest to assist with secretion clearance\par Mucinex bid or\par She will add Mucomyst 10% solution 2 cc to her nebulizer therapy twice daily\par She will continue Zithromax 3 times weekly\par She declines further chest CT/PFTs\par She had 2020 flu vaccine and Covid vaccine\par She no longer smokes\par PT as tolerated\par Cardiology follow-up\par \par DM\par FSG's tid prior to meals\par Weight control\par ADA diet\par Continue present meds = Jardiance/Metformin/glimepiride\par Ophtho and podiatry declined\par Endocrine follow-up declined\par \par HTN\par She reports good BP control\par No recurrent AF or SVT\par Continue diltiazem, Eliquis, Lasix\par Close f/u of CR/BUN\par Cardiology follow-up\par \par Hyperlipidemia\par Low fat diet\par Weight control\par On daily Crestor\par Monitor lipid profile\par \par Poor appetite - improved\par Liberalize diet\par Daily MVI\par Soft foods\par Glucerna shakes\par Protein shakes\par \par \par She will do a TEB visit in 1 week and as needed\par She will call if her status worsens \par She will call for any medical or pulmonary issues\par All of her questions were answered\par She and her brother verbally confirmed understanding of all of the above\par We will arrange a home blood draw for routine labs on the patient\par Info for local agency to provide NP to see patient in the home provided to Michele\par Requested RX's renewed

## 2021-05-11 NOTE — PHYSICAL EXAM
[No Acute Distress] : no acute distress [Alert and Oriented x3] : oriented to person, place, and time [Well Nourished] : well nourished [Well Developed] : well developed [Normal Voice/Communication] : normal voice/communication [No Respiratory Distress] : no respiratory distress  [No Accessory Muscle Use] : no accessory muscle use [de-identified] : Lying in bed; wearing BiPAP; appears comfortable [de-identified] : Bloodshot right eye                 ?  Conjunctival hemorrhage on right [de-identified] : Appears in better spirits and more communicative

## 2021-05-11 NOTE — HEALTH RISK ASSESSMENT
[No] : In the past 12 months have you used drugs other than those required for medical reasons? No [No falls in past year] : Patient reported no falls in the past year [(PHQ-2) Unable to screen] : PHQ-2: unable to screen [de-identified] : None [] : No [de-identified] : Soft regular diet [UnableToScreenReason] : Patient too ill

## 2021-05-11 NOTE — HISTORY OF PRESENT ILLNESS
[Home] : at home, [unfilled] , at the time of the visit. [Medical Office: ([facility])___] : at the medical office located in  [Family Member] : family member [Verbal consent obtained from patient] : the patient, [unfilled] [FreeTextEntry1] : F/U medical/pulmonary visit. [de-identified] : Remains fairly stable.  Vital signs stable.  Glucoses fluctuate.  Oxygen saturations running between 86 and 88%.\par Will be starting PT this Thursday at 4:30 PM.\par Having issues with red bloodshot right eye for last 3 days.  No loss of vision and no  discharge noted.  No fever.\par Has bilateral knee pain.\par Main complaint is of chronic nasal dryness and congestion making it difficult for her to breathe.  Has humidifier in her bedroom 24 hours/day.  Has been using Flonase and Boyd sinus rinses.  Also in air conditioning all day with no pollen exposure.  Has occasional discolored nasal secretions.  Denies sinus or ear pain.  Denies sore throat.  Denies fever or chills.\par Also needs  RX renewals= Zithromax.\par Needs labs done.\par Interested in home care program.

## 2021-05-17 LAB
25(OH)D3 SERPL-MCNC: 64.4 NG/ML
ALBUMIN SERPL ELPH-MCNC: 3.4 G/DL
ALP BLD-CCNC: 67 U/L
ALT SERPL-CCNC: 8 U/L
ANION GAP SERPL CALC-SCNC: 7 MMOL/L
AST SERPL-CCNC: 17 U/L
BASOPHILS # BLD AUTO: 0.03 K/UL
BASOPHILS NFR BLD AUTO: 0.4 %
BILIRUB SERPL-MCNC: 0.2 MG/DL
BUN SERPL-MCNC: 18 MG/DL
CALCIUM SERPL-MCNC: 10.9 MG/DL
CHLORIDE SERPL-SCNC: 95 MMOL/L
CHOLEST SERPL-MCNC: 111 MG/DL
CO2 SERPL-SCNC: 38 MMOL/L
COVID-19 NUCLEOCAPSID  GAM ANTIBODY INTERPRETATION: POSITIVE
COVID-19 SPIKE DOMAIN ANTIBODY INTERPRETATION: POSITIVE
CREAT SERPL-MCNC: 1.3 MG/DL
EOSINOPHIL # BLD AUTO: 0.19 K/UL
EOSINOPHIL NFR BLD AUTO: 2.7 %
ESTIMATED AVERAGE GLUCOSE: 117 MG/DL
FOLATE SERPL-MCNC: >20 NG/ML
GLUCOSE SERPL-MCNC: 87 MG/DL
HBA1C MFR BLD HPLC: 5.7 %
HCT VFR BLD CALC: 27.2 %
HDLC SERPL-MCNC: 60 MG/DL
HGB BLD-MCNC: 7.5 G/DL
IMM GRANULOCYTES NFR BLD AUTO: 0.4 %
IRON SERPL-MCNC: 52 UG/DL
LDLC SERPL CALC-MCNC: 37 MG/DL
LYMPHOCYTES # BLD AUTO: 1.34 K/UL
LYMPHOCYTES NFR BLD AUTO: 18.7 %
MAN DIFF?: NORMAL
MCHC RBC-ENTMCNC: 24.3 PG
MCHC RBC-ENTMCNC: 27.6 GM/DL
MCV RBC AUTO: 88 FL
MONOCYTES # BLD AUTO: 0.72 K/UL
MONOCYTES NFR BLD AUTO: 10.1 %
NEUTROPHILS # BLD AUTO: 4.85 K/UL
NEUTROPHILS NFR BLD AUTO: 67.7 %
NONHDLC SERPL-MCNC: 51 MG/DL
PLATELET # BLD AUTO: 370 K/UL
POTASSIUM SERPL-SCNC: 3.9 MMOL/L
PROT SERPL-MCNC: 5.8 G/DL
RBC # BLD: 3.09 M/UL
RBC # FLD: 18.5 %
SARS-COV-2 AB SERPL IA-ACNC: >250 U/ML
SARS-COV-2 AB SERPL QL IA: 198 INDEX
SODIUM SERPL-SCNC: 140 MMOL/L
TRIGL SERPL-MCNC: 67 MG/DL
TSH SERPL-ACNC: 2.52 UIU/ML
VIT B12 SERPL-MCNC: 921 PG/ML
WBC # FLD AUTO: 7.16 K/UL

## 2021-05-18 ENCOUNTER — LABORATORY RESULT (OUTPATIENT)
Age: 61
End: 2021-05-18

## 2021-05-18 ENCOUNTER — RX RENEWAL (OUTPATIENT)
Age: 61
End: 2021-05-18

## 2021-05-19 LAB
BASOPHILS # BLD AUTO: 0.05 K/UL
BASOPHILS NFR BLD AUTO: 0.6 %
CALCIUM SERPL-MCNC: 11.4 MG/DL
EOSINOPHIL # BLD AUTO: 0.18 K/UL
EOSINOPHIL NFR BLD AUTO: 2.1 %
HCT VFR BLD CALC: 28 %
HGB BLD-MCNC: 7.7 G/DL
IMM GRANULOCYTES NFR BLD AUTO: 0.6 %
LYMPHOCYTES # BLD AUTO: 1.73 K/UL
LYMPHOCYTES NFR BLD AUTO: 19.8 %
MAN DIFF?: NORMAL
MCHC RBC-ENTMCNC: 24.4 PG
MCHC RBC-ENTMCNC: 27.5 GM/DL
MCV RBC AUTO: 88.6 FL
MONOCYTES # BLD AUTO: 0.91 K/UL
MONOCYTES NFR BLD AUTO: 10.4 %
NEUTROPHILS # BLD AUTO: 5.8 K/UL
NEUTROPHILS NFR BLD AUTO: 66.5 %
PARATHYROID HORMONE INTACT: 13 PG/ML
PLATELET # BLD AUTO: 405 K/UL
RBC # BLD: 3.16 M/UL
RBC # FLD: 18.6 %
WBC # FLD AUTO: 8.72 K/UL

## 2021-05-21 ENCOUNTER — NON-APPOINTMENT (OUTPATIENT)
Age: 61
End: 2021-05-21

## 2021-06-02 DIAGNOSIS — B37.0 CANDIDAL STOMATITIS: ICD-10-CM

## 2021-06-02 RX ORDER — FUROSEMIDE 40 MG/1
40 TABLET ORAL
Qty: 90 | Refills: 1 | Status: DISCONTINUED | COMMUNITY
Start: 2019-06-13 | End: 2021-06-02

## 2021-06-03 ENCOUNTER — RX CHANGE (OUTPATIENT)
Age: 61
End: 2021-06-03

## 2021-06-03 ENCOUNTER — APPOINTMENT (OUTPATIENT)
Dept: INTERNAL MEDICINE | Facility: CLINIC | Age: 61
End: 2021-06-03
Payer: COMMERCIAL

## 2021-06-03 PROCEDURE — 99442: CPT

## 2021-06-03 RX ORDER — FLUTICASONE PROPIONATE 50 UG/1
50 SPRAY, METERED NASAL
Qty: 16 | Refills: 0 | Status: DISCONTINUED | COMMUNITY
Start: 2021-04-30 | End: 2021-06-03

## 2021-06-09 ENCOUNTER — APPOINTMENT (OUTPATIENT)
Dept: INTERNAL MEDICINE | Facility: CLINIC | Age: 61
End: 2021-06-09
Payer: COMMERCIAL

## 2021-06-09 DIAGNOSIS — Z87.01 PERSONAL HISTORY OF PNEUMONIA (RECURRENT): ICD-10-CM

## 2021-06-09 LAB
25(OH)D3 SERPL-MCNC: 59 NG/ML
ALBUMIN MFR SERPL ELPH: 59.7 %
ALBUMIN SERPL ELPH-MCNC: 3.8 G/DL
ALBUMIN SERPL-MCNC: 3.8 G/DL
ALBUMIN/GLOB SERPL: 1.5 RATIO
ALP BLD-CCNC: 60 U/L
ALPHA1 GLOB MFR SERPL ELPH: 4.8 %
ALPHA1 GLOB SERPL ELPH-MCNC: 0.3 G/DL
ALPHA2 GLOB MFR SERPL ELPH: 13.4 %
ALPHA2 GLOB SERPL ELPH-MCNC: 0.8 G/DL
ALT SERPL-CCNC: 31 U/L
ANION GAP SERPL CALC-SCNC: 13 MMOL/L
AST SERPL-CCNC: 20 U/L
B-GLOBULIN MFR SERPL ELPH: 11.7 %
B-GLOBULIN SERPL ELPH-MCNC: 0.7 G/DL
BASOPHILS # BLD AUTO: 0.01 K/UL
BASOPHILS NFR BLD AUTO: 0.1 %
BILIRUB SERPL-MCNC: 0.4 MG/DL
BUN SERPL-MCNC: 29 MG/DL
CALCIUM SERPL-MCNC: 9.7 MG/DL
CALCIUM SERPL-MCNC: 9.7 MG/DL
CHLORIDE SERPL-SCNC: 91 MMOL/L
CHOLEST SERPL-MCNC: 169 MG/DL
CO2 SERPL-SCNC: 38 MMOL/L
CREAT SERPL-MCNC: 1.45 MG/DL
EOSINOPHIL # BLD AUTO: 0.17 K/UL
EOSINOPHIL NFR BLD AUTO: 1.5 %
ESTIMATED AVERAGE GLUCOSE: 146 MG/DL
FOLATE SERPL-MCNC: >20 NG/ML
GAMMA GLOB FLD ELPH-MCNC: 0.7 G/DL
GAMMA GLOB MFR SERPL ELPH: 10.4 %
GLUCOSE SERPL-MCNC: 144 MG/DL
HAPTOGLOB SERPL-MCNC: 162 MG/DL
HBA1C MFR BLD HPLC: 6.7 %
HCT VFR BLD CALC: 32.4 %
HDLC SERPL-MCNC: 80 MG/DL
HGB A MFR BLD: 94.3 %
HGB A2 MFR BLD: 2 %
HGB BLD-MCNC: 9.4 G/DL
HGB FRACT BLD-IMP: NORMAL
IMM GRANULOCYTES NFR BLD AUTO: 0.6 %
INTERPRETATION SERPL IEP-IMP: NORMAL
IRON SERPL-MCNC: 53 UG/DL
LDLC SERPL CALC-MCNC: 70 MG/DL
LYMPHOCYTES # BLD AUTO: 1.39 K/UL
LYMPHOCYTES NFR BLD AUTO: 12.2 %
MAGNESIUM SERPL-MCNC: 1.8 MG/DL
MAN DIFF?: NORMAL
MCHC RBC-ENTMCNC: 26.1 PG
MCHC RBC-ENTMCNC: 29 GM/DL
MCV RBC AUTO: 90 FL
MONOCYTES # BLD AUTO: 0.4 K/UL
MONOCYTES NFR BLD AUTO: 3.5 %
NEUTROPHILS # BLD AUTO: 9.38 K/UL
NEUTROPHILS NFR BLD AUTO: 82.1 %
NONHDLC SERPL-MCNC: 89 MG/DL
PARATHYROID HORMONE INTACT: 86 PG/ML
PLATELET # BLD AUTO: 181 K/UL
POTASSIUM SERPL-SCNC: 4.1 MMOL/L
PROT SERPL-MCNC: 6.3 G/DL
RBC # BLD: 3.6 M/UL
RBC # FLD: 16.4 %
SODIUM SERPL-SCNC: 142 MMOL/L
TRIGL SERPL-MCNC: 94 MG/DL
TSH SERPL-ACNC: 2.94 UIU/ML
VIT B12 SERPL-MCNC: 857 PG/ML
WBC # FLD AUTO: 11.42 K/UL

## 2021-06-09 PROCEDURE — 99495 TRANSJ CARE MGMT MOD F2F 14D: CPT | Mod: 95

## 2021-06-09 NOTE — ASSESSMENT
[FreeTextEntry1] : Recent pneumonia\par Has completed course of antibiotics\par Monitor fever curve\par Monitor oxygen saturations–if severely short of breath or with persistently poor saturations to return to ER\par Will try to arrange for home portable chest x-ray to follow-up pneumonia\par \par The patient has end-stage emphysema\par Lung transplantation has been declined by [place] and [facility]\par She will continue BiPAP therapy 24 hours/day\par She will continue oxygen 24 hours/day\par She will continue Symbicort, Spiriva, Singulair\par On Prednisone taper\par She will continue nebulizer therapy every 4-6 hours\par She will use an Acapella device to assist with secretion clearance\par If this is ineffective then we will order a smart vest to assist with secretion clearance\par Mucinex bid or\par She will add Mucomyst 10% solution 2 cc to her nebulizer therapy twice daily\par Hold Zithromax for now\par She declines further chest CT/PFTs\par She had 2020 flu vaccine and Covid vaccine\par She no longer smokes\par PT as tolerated\par Cardiology follow-up\par \par DM\par FSG's tid prior to meals\par Weight control\par ADA diet\par Continue present meds = Jardiance/Metformin/glimepiride\par Ophtho and podiatry declined\par Endocrine follow-up declined\par \par HTN\par She reports good BP control\par No recurrent AF or SVT\par Continue diltiazem, Eliquis, Brilinta\par Close f/u of CR/BUN - will change Lasix to 80 mg a/w 40 mg daily\par Cardiology follow-up\par \par Hyperlipidemia\par Low fat diet\par Weight control\par On daily Crestor\par Monitor lipid profile\par \par Crackling in ears–suspect ETD\par Flonase 1 spray per nostril daily to twice daily\par Decongestant as needed\par Humidifier\par Saline nasal gel/rinses\par ENT f/u\par \par Anemia\par HGB up to 9.4 after 2U PRBC's\par On reduced Brilinta dose\par Anemia panel unremarkable\par Hematology f/u\par Transfuse as needed\par \par RX's renewed as requested\par See result note\par She will do a TEB visit in 2 weeks and as needed\par She will call if her status worsens \par She will call for any medical or pulmonary issues\par All of her questions were answered\par All of the above was d/w her and her brother\par She and her brother verbally confirmed understanding of all of the above and agreement with the above plan\par We will arrange a home blood draw for routine labs on the patient in 2 weeks

## 2021-06-09 NOTE — PHYSICAL EXAM
[No Acute Distress] : no acute distress [Well Nourished] : well nourished [Ill-Appearing] : ill-appearing [Alert and Oriented x3] : oriented to person, place, and time [Well Developed] : well developed [de-identified] : Lying in bed; wearing nasal cannula oxygen [de-identified] : Tachypneic; no audible stridor or wheezing noted [70137 - Moderate Complexity requires multiple possible diagnoses and/or the management options, moderate complexity of the medical data (tests, etc.) to be reviewed, and moderate risk of significant complications, morbidity, and/or mortality as well as co] : Moderate Complexity

## 2021-06-09 NOTE — HISTORY OF PRESENT ILLNESS
[Post-hospitalization from ___ Hospital] : Post-hospitalization from [unfilled] Hospital [Discharge Summary] : discharge summary [Discharge Med List] : discharge medication list [Med Reconciliation] : medication reconciliation has been completed [Home] : at home, [unfilled] , at the time of the visit. [Medical Office: ([facility])___] : at the medical office located in  [Family Member] : family member [Verbal consent obtained from patient] : the patient, [unfilled] [Admitted on: ___] : The patient was admitted on [unfilled] [Discharged on ___] : discharged on [unfilled] [Patient Contacted By: ____] : and contacted by [unfilled] [FreeTextEntry2] : See scanned discharge summary and medication list.\par Records reviewed and discussed with patient/brother Michele.\par Afebrile with stable VS as per brother.\par Fingersticks have been in acceptable range.\par Edema has improved.  She denies any calf pain.  She has good urine output.  Her appetite is fair.\par Denies any chest pain.  Denies any cough or hemoptysis.  Using BiPAP 24 hours/day.  Remains short of breath.\par Still having issues with ETD.  They

## 2021-06-09 NOTE — HEALTH RISK ASSESSMENT
[Intercurrent ED visits] : went to ED [Intercurrent hospitalizations] : was admitted to the hospital  [No] : In the past 12 months have you used drugs other than those required for medical reasons? No [No falls in past year] : Patient reported no falls in the past year [(PHQ-2) Unable to screen] : PHQ-2: unable to screen [] : No [de-identified] : None [de-identified] : Soft regular diet [UnableToScreenReason] : Patient too ill [Patient declined mammogram] : Patient declined mammogram [Patient declined PAP Smear] : Patient declined PAP Smear [Patient declined bone density test] : Patient declined bone density test [Patient declined colonoscopy] : Patient declined colonoscopy [HIV test declined] : HIV test declined [Hepatitis C test declined] : Hepatitis C test declined [Change in mental status noted] : No change in mental status noted [Behavioral] : behavioral [Alone] : lives alone [# of Members in Household ___] :  household currently consist of [unfilled] member(s) [On disability] : on disability [High School] : high school [Single] : single [# Of Children ___] : has [unfilled] children [Feels Safe at Home] : Feels safe at home [Reports changes in hearing] : Reports changes in hearing [Reports changes in vision] : Reports no changes in vision [Reports changes in dental health] : Reports no changes in dental health [Smoke Detector] : smoke detector [Carbon Monoxide Detector] : carbon monoxide detector [Guns at Home] : no guns at home [Seat Belt] :  uses seat belt [Sunscreen] : does not use sunscreen [Travel to Developing Areas] : does not  travel to developing areas [TB Exposure] : is not being exposed to tuberculosis [Caregiver Concerns] : does not have caregiver concerns [Reviewed no changes] : Reviewed no changes [Designated Healthcare Proxy] : Designated healthcare proxy [Name: ___] : Health Care Proxy's Name: [unfilled]  [Relationship: ___] : Relationship: [unfilled] [AdvancecareDate] : 06/21

## 2021-06-11 ENCOUNTER — NON-APPOINTMENT (OUTPATIENT)
Age: 61
End: 2021-06-11

## 2021-06-12 NOTE — DIETITIAN INITIAL EVALUATION ADULT. - PROBLEM SELECTOR PLAN 4
PATIENT HAS LIMITED ENGLISH AND SAYS THAT HIS DAUGHTER, JOSE BUCK, IS ABLE 
TO SPEAK FLUENT ENGLISH. JOSE CONTACT NUMBER IS [phone]. PHONE NUMBER 
CALLED AND MESSAGE LEFT. /78 in ED  -on cardizem at rehab, continue  -monitor routine hemodynamics

## 2021-06-14 ENCOUNTER — OUTPATIENT (OUTPATIENT)
Dept: OUTPATIENT SERVICES | Facility: HOSPITAL | Age: 61
LOS: 1 days | Discharge: HOME | End: 2021-06-14

## 2021-06-14 DIAGNOSIS — R79.9 ABNORMAL FINDING OF BLOOD CHEMISTRY, UNSPECIFIED: ICD-10-CM

## 2021-06-15 ENCOUNTER — APPOINTMENT (OUTPATIENT)
Dept: HEMATOLOGY ONCOLOGY | Facility: CLINIC | Age: 61
End: 2021-06-15
Payer: COMMERCIAL

## 2021-06-15 DIAGNOSIS — N18.9 CHRONIC KIDNEY DISEASE, UNSPECIFIED: ICD-10-CM

## 2021-06-15 DIAGNOSIS — D63.1 CHRONIC KIDNEY DISEASE, UNSPECIFIED: ICD-10-CM

## 2021-06-15 PROCEDURE — 99214 OFFICE O/P EST MOD 30 MIN: CPT | Mod: 95

## 2021-06-15 RX ORDER — LOSARTAN POTASSIUM 25 MG/1
25 TABLET, FILM COATED ORAL
Refills: 0 | Status: DISCONTINUED | COMMUNITY
Start: 2021-01-12 | End: 2021-06-15

## 2021-06-15 RX ORDER — POTASSIUM CHLORIDE 1500 MG/1
20 TABLET, FILM COATED, EXTENDED RELEASE ORAL
Qty: 30 | Refills: 0 | Status: DISCONTINUED | COMMUNITY
Start: 2021-03-11 | End: 2021-06-15

## 2021-06-15 RX ORDER — EMPAGLIFLOZIN 10 MG/1
10 TABLET, FILM COATED ORAL
Refills: 0 | Status: DISCONTINUED | COMMUNITY
Start: 2021-01-12 | End: 2021-06-15

## 2021-06-15 RX ORDER — EMPAGLIFLOZIN 10 MG/1
10 TABLET, FILM COATED ORAL
Refills: 0 | Status: DISCONTINUED | COMMUNITY
End: 2021-06-15

## 2021-06-15 RX ORDER — MULTIVIT-MIN/FOLIC/VIT K/LYCOP 400-300MCG
50 MCG TABLET ORAL
Refills: 0 | Status: DISCONTINUED | COMMUNITY
Start: 2019-02-13 | End: 2021-06-15

## 2021-06-15 RX ORDER — OFLOXACIN 3 MG/ML
0.3 SOLUTION/ DROPS OPHTHALMIC 4 TIMES DAILY
Qty: 1 | Refills: 0 | Status: DISCONTINUED | COMMUNITY
Start: 2020-12-03 | End: 2021-06-15

## 2021-06-15 RX ORDER — BENZONATATE 100 MG/1
100 CAPSULE ORAL
Qty: 15 | Refills: 0 | Status: DISCONTINUED | COMMUNITY
Start: 2020-10-24 | End: 2021-06-15

## 2021-06-15 RX ORDER — PRASUGREL 5 MG/1
5 TABLET, FILM COATED ORAL
Qty: 90 | Refills: 0 | Status: DISCONTINUED | COMMUNITY
Start: 2020-12-22 | End: 2021-06-15

## 2021-06-15 RX ORDER — SENNOSIDES 8.6 MG
TABLET ORAL
Refills: 0 | Status: DISCONTINUED | COMMUNITY
End: 2021-06-15

## 2021-06-15 RX ORDER — ESCITALOPRAM OXALATE 5 MG/1
5 TABLET ORAL
Qty: 90 | Refills: 1 | Status: DISCONTINUED | COMMUNITY
Start: 2020-12-03 | End: 2021-06-15

## 2021-06-15 RX ORDER — LOSARTAN POTASSIUM 25 MG/1
25 TABLET, FILM COATED ORAL
Refills: 0 | Status: DISCONTINUED | COMMUNITY
End: 2021-06-15

## 2021-06-15 RX ORDER — ELECTROLYTES/DEXTROSE
SOLUTION, ORAL ORAL DAILY
Refills: 0 | Status: DISCONTINUED | COMMUNITY
Start: 2019-02-13 | End: 2021-06-15

## 2021-06-15 RX ORDER — GLIMEPIRIDE 1 MG/1
1 TABLET ORAL
Qty: 60 | Refills: 0 | Status: DISCONTINUED | COMMUNITY
Start: 2021-04-01 | End: 2021-06-15

## 2021-06-15 RX ORDER — FLUCONAZOLE 100 MG/1
100 TABLET ORAL DAILY
Qty: 7 | Refills: 0 | Status: DISCONTINUED | COMMUNITY
Start: 2021-06-02 | End: 2021-06-15

## 2021-06-17 NOTE — ASSESSMENT
[FreeTextEntry1] : This is a 61 year old woman with a history of COPD, COVID 19 infection March 2021, since then patient has een even more debilitated and Home oxygen requiring, unable to leave the house. Patient was in the ER for transfusions in the past. Hg as low as 7.4g/dl.  Anemia of chronic disease and inflammation superimposed on anemia of chronic renal insufficiency.  We discussed treatment for this. THe more assured way of improving hemoglobin to believe her symptoms of dyspnea would be to transfuse.  THis can be done at the ER, however that is a lengthy and strenuous process. Can also do this here at [facility], but I explained that by necessity this is a multi day process. She would have to come in once for a type and screen and a 2nd time to actually get the blood transfusion on a separate appointment. Patient can not agree to this stating that shes too debilitated to leave the house.  There is no way to accommodate this at home. As an alternative we can do weekly blood draws and administer DIDI support. Explained that this odes carry the side effecct of increased risk of cardiopulmonary thrombosis, especially at higher treatment goals.  W e can limit the goal to < 10.5g/dl to mitigate most of the excess risk of CVA and heart disease, however this does not completely mitigate the risk.  IT is also at times very difficult to get the procrit/retacrit DIDI support at home given insurance coverages but we can see what reimbursement we can achieve with this.

## 2021-06-17 NOTE — HISTORY OF PRESENT ILLNESS
[de-identified] : This is a 61 year old woman with a history of COPD, severe COVID in March 2021 since then has recovered somewhat, but remains on BIPAP, and is on 4-5 liters of oxygen constantly.  Patient severely debilitated and mostly home bound.    Uses [facility] to draw blood every 2-3 weeks.  Hg down to 7.4g/dl was sent to hospital at [facility] and had blood transfusion. Had a GI workup, but was unable to do colonoscopy, no bleeding, but did have a guaiac positive.

## 2021-06-23 ENCOUNTER — APPOINTMENT (OUTPATIENT)
Dept: INTERNAL MEDICINE | Facility: CLINIC | Age: 61
End: 2021-06-23
Payer: COMMERCIAL

## 2021-06-23 ENCOUNTER — RX RENEWAL (OUTPATIENT)
Age: 61
End: 2021-06-23

## 2021-06-23 DIAGNOSIS — R04.0 EPISTAXIS: ICD-10-CM

## 2021-06-23 LAB
ALBUMIN SERPL ELPH-MCNC: 4.2 G/DL
ALP BLD-CCNC: 64 U/L
ALT SERPL-CCNC: 20 U/L
ANION GAP SERPL CALC-SCNC: 14 MMOL/L
AST SERPL-CCNC: 15 U/L
BASOPHILS # BLD AUTO: 0.05 K/UL
BASOPHILS NFR BLD AUTO: 0.6 %
BILIRUB SERPL-MCNC: 0.3 MG/DL
BUN SERPL-MCNC: 35 MG/DL
CALCIUM SERPL-MCNC: 9.7 MG/DL
CHLORIDE SERPL-SCNC: 96 MMOL/L
CO2 SERPL-SCNC: 33 MMOL/L
CREAT SERPL-MCNC: 1.36 MG/DL
DEPRECATED KAPPA LC FREE/LAMBDA SER: 0.98 RATIO
EOSINOPHIL # BLD AUTO: 0.2 K/UL
EOSINOPHIL NFR BLD AUTO: 2.3 %
FERRITIN SERPL-MCNC: 62 NG/ML
GLUCOSE SERPL-MCNC: 133 MG/DL
HAPTOGLOB SERPL-MCNC: <20 MG/DL
HCT VFR BLD CALC: 32.5 %
HGB BLD-MCNC: 9.7 G/DL
IMM GRANULOCYTES NFR BLD AUTO: 3.8 %
IRON SATN MFR SERPL: 25 %
IRON SERPL-MCNC: 85 UG/DL
KAPPA LC CSF-MCNC: 1.75 MG/DL
KAPPA LC SERPL-MCNC: 1.72 MG/DL
LDH SERPL-CCNC: 282 U/L
LYMPHOCYTES # BLD AUTO: 2.18 K/UL
LYMPHOCYTES NFR BLD AUTO: 25.6 %
MAN DIFF?: NORMAL
MCHC RBC-ENTMCNC: 26.8 PG
MCHC RBC-ENTMCNC: 29.8 GM/DL
MCV RBC AUTO: 89.8 FL
MONOCYTES # BLD AUTO: 0.66 K/UL
MONOCYTES NFR BLD AUTO: 7.7 %
NEUTROPHILS # BLD AUTO: 5.11 K/UL
NEUTROPHILS NFR BLD AUTO: 60 %
PLATELET # BLD AUTO: 200 K/UL
POTASSIUM SERPL-SCNC: 4.1 MMOL/L
PROT SERPL-MCNC: 6 G/DL
RBC # BLD: 3.62 M/UL
RBC # BLD: 3.62 M/UL
RBC # FLD: 16 %
RETICS # AUTO: 3.1 %
RETICS AGGREG/RBC NFR: 113.7 K/UL
SODIUM SERPL-SCNC: 142 MMOL/L
TIBC SERPL-MCNC: 346 UG/DL
UIBC SERPL-MCNC: 261 UG/DL
WBC # FLD AUTO: 8.52 K/UL

## 2021-06-23 PROCEDURE — 99214 OFFICE O/P EST MOD 30 MIN: CPT | Mod: 95

## 2021-06-23 NOTE — REVIEW OF SYSTEMS
[Vision Problems] : vision problems [Nosebleed] : nosebleed [Postnasal Drip] : postnasal drip [Leg Claudication] : leg claudication [Lower Ext Edema] : lower extremity edema [Shortness Of Breath] : shortness of breath [Dyspnea on Exertion] : dyspnea on exertion [Heartburn] : heartburn [Joint Pain] : joint pain [Muscle Weakness] : muscle weakness [Muscle Pain] : muscle pain [Back Pain] : back pain [Anxiety] : anxiety [Easy Bruising] : easy bruising [Negative] : Psychiatric

## 2021-06-23 NOTE — HEALTH RISK ASSESSMENT
[] : No [No] : In the past 12 months have you used drugs other than those required for medical reasons? No [(PHQ-2) Unable to screen] : PHQ-2: unable to screen [No falls in past year] : Patient reported no falls in the past year [de-identified] : Hematology [de-identified] : Physical therapy [de-identified] : Regular

## 2021-06-23 NOTE — PHYSICAL EXAM
[No Acute Distress] : no acute distress [Well Nourished] : well nourished [Well Developed] : well developed [Ill-Appearing] : ill-appearing [No Respiratory Distress] : no respiratory distress  [No Accessory Muscle Use] : no accessory muscle use [Alert and Oriented x3] : oriented to person, place, and time [de-identified] : Lying in bed; wearing nasal cannula oxygen [de-identified] :  no audible stridor or wheezing noted

## 2021-06-23 NOTE — HISTORY OF PRESENT ILLNESS
[Family Member] : family member [FreeTextEntry8] : Recent labs from June 21, 2021 reviewed with the patient and Michele in detail.\par Her hemoglobin remained stable at 9.7.  They report that they spoke with Dr. Townsend of hematology and that DIDI support is planned.\par The medication was approved and hopefully will be shipped shortly so that they can commence her injections.  They wish to know how often the patient's hemoglobin and hematocrit will need to be checked while on therapy so that I can order via [facility].  I advised him that I will reach out to Dr. Townsend.  Her creatinine has improved.  She is now using Lasix 40 mg daily.  She reports no increase in her edema or any shortness of breath.  She does have good urine output.  She complains of intermittent hard stools.  She is trying to increase her fluid intake and fiber intake.  Her main complaints include ear crackling likely related to ETD and intermittent epistaxis related to her chronic oxygen and BiPAP use.  She is trying to use saline nasal rinses and saline nasal gel as well as humidification on her equipment and in her room.  She uses Flonase as needed to relieve her ETD symptoms.  However this may be contributing to her epistaxis as Flonase tends to be quite drying in the nose.

## 2021-06-23 NOTE — ASSESSMENT
[FreeTextEntry1] : ETD/epistaxis\par ENT follow-up\par Continue humidification\par Continue saline nasal rinses and gel\par Flonase as needed///decongestant as needed\par \par The patient has end-stage emphysema\par Lung transplantation has been declined by [place] and [facility]\par She will continue BiPAP therapy 24 hours/day\par She will continue oxygen 24 hours/day\par She will continue Symbicort, Spiriva, Singulair\par On Prednisone taper\par She will continue nebulizer therapy every 4-6 hours\par She will use an Acapella device to assist with secretion clearance\par If this is ineffective then we will order a smart vest to assist with secretion clearance\par Mucinex bid or\par She will add Mucomyst 10% solution 2 cc to her nebulizer therapy twice daily\par Hold Zithromax for now\par She declines further chest CT/PFTs\par We will try to arrange follow-up chest x-ray at home to ensure resolution of pneumonia\par She had 2020 flu vaccine and Covid vaccine\par She no longer smokes\par PT as tolerated\par Cardiology follow-up\par \par DM\par FSG's tid prior to meals\par Weight control\par ADA diet\par Continue present meds = Jardiance/Metformin/glimepiride\par Ophtho and podiatry declined\par Endocrine follow-up declined\par \par HTN\par She reports good BP control\par No recurrent AF or SVT\par Continue diltiazem, Eliquis, Brilinta\par Close f/u of CR/BUN = creatinine improved to 1.36 with no increased edema\par Continue Lasix 40 mg daily for now\par Cardiology follow-up\par \par Hyperlipidemia\par Low fat diet\par Weight control\par On daily Crestor\par Monitor lipid profile\par \par Anemia\par HGB stable at 9.7\par Check with Dr. Townsend regarding drop in haptoglobin and elevated LDH\par On reduced Brilinta dose\par DIDI support planned by Dr. Townsend\par Hematology f/u\par Transfuse as needed\par We will closely monitor CBC\par \par Hard stools\par Start Colace 100 to 300 mg daily\par Continue hydration and fiber in diet\par Can use Senokot as needed\par \par See result note\par She will do a TEB visit in 2 weeks and as needed\par She will call if her status worsens \par She will call for any medical or pulmonary issues\par All of her questions were answered\par All of the above was d/w her and her brother\par She and her brother verbally confirmed understanding of all of the above and agreement with the above plan\par We will arrange a home blood draw for routine labs on the patient in 2 weeks

## 2021-06-25 ENCOUNTER — NON-APPOINTMENT (OUTPATIENT)
Age: 61
End: 2021-06-25

## 2021-06-25 DIAGNOSIS — R53.81 OTHER MALAISE: ICD-10-CM

## 2021-06-28 ENCOUNTER — RX CHANGE (OUTPATIENT)
Age: 61
End: 2021-06-28

## 2021-06-28 RX ORDER — FLUTICASONE PROPIONATE 50 UG/1
50 SPRAY, METERED NASAL
Qty: 16 | Refills: 0 | Status: DISCONTINUED | COMMUNITY
Start: 2021-06-03 | End: 2021-06-28

## 2021-06-29 LAB — M PROTEIN SPEC IFE-MCNC: NORMAL

## 2021-07-02 ENCOUNTER — NON-APPOINTMENT (OUTPATIENT)
Age: 61
End: 2021-07-02

## 2021-07-02 LAB
ANION GAP SERPL CALC-SCNC: 19 MMOL/L
BASOPHILS # BLD AUTO: 0.03 K/UL
BASOPHILS NFR BLD AUTO: 0.4 %
BUN SERPL-MCNC: 25 MG/DL
CALCIUM SERPL-MCNC: 9.7 MG/DL
CHLORIDE SERPL-SCNC: 99 MMOL/L
CO2 SERPL-SCNC: 18 MMOL/L
CREAT SERPL-MCNC: 1.18 MG/DL
EOSINOPHIL # BLD AUTO: 0.05 K/UL
EOSINOPHIL NFR BLD AUTO: 0.7 %
GLUCOSE SERPL-MCNC: 114 MG/DL
HAPTOGLOB SERPL-MCNC: <20 MG/DL
HCT VFR BLD CALC: 34.1 %
HGB BLD-MCNC: 10 G/DL
IMM GRANULOCYTES NFR BLD AUTO: 1.1 %
LYMPHOCYTES # BLD AUTO: 1.64 K/UL
LYMPHOCYTES NFR BLD AUTO: 21.6 %
MAN DIFF?: NORMAL
MCHC RBC-ENTMCNC: 26.8 PG
MCHC RBC-ENTMCNC: 29.3 GM/DL
MCV RBC AUTO: 91.4 FL
MONOCYTES # BLD AUTO: 0.71 K/UL
MONOCYTES NFR BLD AUTO: 9.4 %
NEUTROPHILS # BLD AUTO: 5.08 K/UL
NEUTROPHILS NFR BLD AUTO: 66.8 %
PLATELET # BLD AUTO: 233 K/UL
POTASSIUM SERPL-SCNC: 4.9 MMOL/L
RBC # BLD: 3.73 M/UL
RBC # FLD: 17.9 %
SODIUM SERPL-SCNC: 136 MMOL/L
WBC # FLD AUTO: 7.59 K/UL

## 2021-07-08 ENCOUNTER — NON-APPOINTMENT (OUTPATIENT)
Age: 61
End: 2021-07-08

## 2021-07-11 ENCOUNTER — RX RENEWAL (OUTPATIENT)
Age: 61
End: 2021-07-11

## 2021-07-12 LAB
BASOPHILS # BLD AUTO: 0.03 K/UL
BASOPHILS NFR BLD AUTO: 0.5 %
DIRECT COOMBS: NORMAL
EOSINOPHIL # BLD AUTO: 0.06 K/UL
EOSINOPHIL NFR BLD AUTO: 0.9 %
HAPTOGLOB SERPL-MCNC: 86 MG/DL
HCT VFR BLD CALC: 31.9 %
HGB BLD-MCNC: 9.5 G/DL
IMM GRANULOCYTES NFR BLD AUTO: 0.5 %
LYMPHOCYTES # BLD AUTO: 1.44 K/UL
LYMPHOCYTES NFR BLD AUTO: 21.7 %
MAN DIFF?: NORMAL
MCHC RBC-ENTMCNC: 26.5 PG
MCHC RBC-ENTMCNC: 29.8 GM/DL
MCV RBC AUTO: 89.1 FL
MONOCYTES # BLD AUTO: 0.64 K/UL
MONOCYTES NFR BLD AUTO: 9.7 %
NEUTROPHILS # BLD AUTO: 4.43 K/UL
NEUTROPHILS NFR BLD AUTO: 66.7 %
PLATELET # BLD AUTO: 350 K/UL
RBC # BLD: 3.58 M/UL
RBC # FLD: 16.6 %
WBC # FLD AUTO: 6.63 K/UL

## 2021-07-14 ENCOUNTER — APPOINTMENT (OUTPATIENT)
Dept: INTERNAL MEDICINE | Facility: CLINIC | Age: 61
End: 2021-07-14
Payer: COMMERCIAL

## 2021-07-14 DIAGNOSIS — K59.09 OTHER CONSTIPATION: ICD-10-CM

## 2021-07-14 LAB
ALBUMIN SERPL ELPH-MCNC: 3.8 G/DL
ALP BLD-CCNC: 57 U/L
ALT SERPL-CCNC: 11 U/L
ANION GAP SERPL CALC-SCNC: 16 MMOL/L
AST SERPL-CCNC: 15 U/L
BASOPHILS # BLD AUTO: 0.03 K/UL
BASOPHILS NFR BLD AUTO: 0.5 %
BILIRUB SERPL-MCNC: 0.2 MG/DL
BUN SERPL-MCNC: 23 MG/DL
CALCIUM SERPL-MCNC: 9.3 MG/DL
CHLORIDE SERPL-SCNC: 95 MMOL/L
CO2 SERPL-SCNC: 28 MMOL/L
CREAT SERPL-MCNC: 1.44 MG/DL
EOSINOPHIL # BLD AUTO: 0.09 K/UL
EOSINOPHIL NFR BLD AUTO: 1.5 %
GLUCOSE SERPL-MCNC: 85 MG/DL
HCT VFR BLD CALC: 31.1 %
HGB BLD-MCNC: 9.1 G/DL
IMM GRANULOCYTES NFR BLD AUTO: 0.5 %
LYMPHOCYTES # BLD AUTO: 1.45 K/UL
LYMPHOCYTES NFR BLD AUTO: 24.4 %
MAN DIFF?: NORMAL
MCHC RBC-ENTMCNC: 25.8 PG
MCHC RBC-ENTMCNC: 29.3 GM/DL
MCV RBC AUTO: 88.1 FL
MONOCYTES # BLD AUTO: 0.74 K/UL
MONOCYTES NFR BLD AUTO: 12.4 %
NEUTROPHILS # BLD AUTO: 3.61 K/UL
NEUTROPHILS NFR BLD AUTO: 60.7 %
PLATELET # BLD AUTO: 307 K/UL
POTASSIUM SERPL-SCNC: 4.3 MMOL/L
PROT SERPL-MCNC: 5.6 G/DL
RBC # BLD: 3.53 M/UL
RBC # FLD: 16.2 %
SODIUM SERPL-SCNC: 140 MMOL/L
WBC # FLD AUTO: 5.95 K/UL

## 2021-07-14 PROCEDURE — 99214 OFFICE O/P EST MOD 30 MIN: CPT | Mod: 95

## 2021-07-14 NOTE — HISTORY OF PRESENT ILLNESS
[Home] : at home, [unfilled] , at the time of the visit. [Medical Office: ([facility])___] : at the medical office located in  [Family Member] : family member [Verbal consent obtained from patient] : the patient, [unfilled] [FreeTextEntry8] : Recent labs from July 13, 2021 reviewed with the patient and Michele in detail.  She received another injection on Friday to support her hemoglobin.  She is now using Metformin 500 mg twice daily with good fingersticks.  She is using Lasix 40 mg daily.  She reports no increase in her edema. She does have good urine output.  She complains of intermittent hard stools.  Colace and Senokot have helped.  She continues to notice easy bruisability.  She is doing physical therapy for 30 minutes twice weekly.  She would like to increase her  sessions to 1 hour twice weekly and would like to do upper extremity exercises well.  A new prescription will be provided.  She denies any new abdominal pain or nausea/vomiting.  She denies any black or bloody stools.  She denies any UTI symptoms.  She denies any orthopnea or PND.  She denies any calf pain.  She denies any fevers or diaphoresis.  She denies any new chest pain or palpitations.  Her breathing has been stable.  She denies any increased cough or  hemoptysis.  She continues to have eye irritation and has been using eyedrops with some relief.  She has a good appetite.  She has been in relatively good spirits.

## 2021-07-14 NOTE — PHYSICAL EXAM
[No Acute Distress] : no acute distress [Well Nourished] : well nourished [Well Developed] : well developed [No Respiratory Distress] : no respiratory distress  [No Accessory Muscle Use] : no accessory muscle use [Alert and Oriented x3] : oriented to person, place, and time [Well-Appearing] : well-appearing [Speech Grossly Normal] : speech grossly normal [Normal Affect] : the affect was normal [de-identified] : Lying in bed; wearing nasal cannula oxygen [de-identified] :  no audible stridor or wheezing noted [de-identified] : Scattered small bruises on arms

## 2021-07-14 NOTE — REVIEW OF SYSTEMS
[Vision Problems] : vision problems [Nosebleed] : nosebleed [Postnasal Drip] : postnasal drip [Leg Claudication] : leg claudication [Shortness Of Breath] : shortness of breath [Dyspnea on Exertion] : dyspnea on exertion [Heartburn] : heartburn [Joint Pain] : joint pain [Muscle Weakness] : muscle weakness [Muscle Pain] : muscle pain [Back Pain] : back pain [Anxiety] : anxiety [Easy Bruising] : easy bruising [Negative] : Heme/Lymph [Dryness] : dryness  [Constipation] : constipation [Unsteady Walking] : ataxia

## 2021-07-14 NOTE — HEALTH RISK ASSESSMENT
[No] : In the past 12 months have you used drugs other than those required for medical reasons? No [No falls in past year] : Patient reported no falls in the past year [] : No [Patient refused screening] : Patient refused screening [de-identified] : Physical therapy [de-identified] : Regular

## 2021-07-14 NOTE — ASSESSMENT
[FreeTextEntry1] : Easy bruisability\par Likely due to use of aspirin and Brilinta and Eliquis\par Has capillary fragility in the skin and thinning of skin due to prolonged steroid therapy\par Platelets are within normal limits\par Reassured\par \par The patient has end-stage emphysema\par Lung transplantation has been declined by [facility] and [facility]\par She will continue BiPAP therapy 24 hours/day\par She will continue oxygen 24 hours/day\par She will continue Symbicort, Spiriva, Singulair\par Off Prednisone\par She will continue nebulizer therapy every 4-6 hours\par She will use an Acapella device to assist with secretion clearance\par If this is ineffective then we will order a smart vest to assist with secretion clearance\par Mucinex bid or\par She will add Mucomyst 10% solution 2 cc to her nebulizer therapy twice daily\par Hold Zithromax for now\par She declines further chest CT/PFTs\par She had 2020 flu vaccine and Covid vaccine\par She no longer smokes\par PT as tolerated = new prescription faxed to [facility] for upper extremity exercise and to increase PT to 60 minutes twice weekly\par Cardiology follow-up\par Can use OTC eyedrops for eye irritation as needed\par \par DM\par FSG's tid prior to meals\par Weight control\par ADA diet\par Presently on Metformin 500 mg twice daily\par Ophtho and podiatry declined\par Endocrine follow-up declined\par \par HTN\par She reports good BP control\par No recurrent AF or SVT\par Continue diltiazem, Eliquis, Brilinta, ASA\par Close f/u of CR/BUN = with no increased edema\par Continue Lasix 40 mg daily for now\par Cardiology follow-up\par \par Hyperlipidemia\par Low fat diet\par Weight control\par On daily Crestor\par Monitor lipid profile\par \par Anemia\par HGB stable at 9.1\par Haptoglobin level improved and negative direct Aravind noted\par DIDI support planned by Dr. Townsend\par Hematology f/u\par Transfuse as needed\par We will closely monitor CBC\par \par Hard stools\par Colace 100 to 300 mg daily\par Continue hydration and fiber in diet = plans to use prune juice daily\par Can use Senokot as needed\par \par She will do a TEB visit in 2 weeks and as needed\par She will call if her status worsens \par She will call for any medical or pulmonary issues\par All of her questions were answered\par All of the above was d/w her and her brother\par She and her brother verbally confirmed understanding of all of the above and agreement with the above plan\par We will arrange a home blood draw for next week

## 2021-07-20 ENCOUNTER — LABORATORY RESULT (OUTPATIENT)
Age: 61
End: 2021-07-20

## 2021-07-23 ENCOUNTER — NON-APPOINTMENT (OUTPATIENT)
Age: 61
End: 2021-07-23

## 2021-07-23 RX ORDER — ALPRAZOLAM 0.25 MG/1
0.25 TABLET ORAL DAILY
Qty: 30 | Refills: 0 | Status: DISCONTINUED | COMMUNITY
Start: 2021-02-17 | End: 2021-07-23

## 2021-07-30 LAB
ANION GAP SERPL CALC-SCNC: 10 MMOL/L
BASOPHILS # BLD AUTO: 0.02 K/UL
BASOPHILS NFR BLD AUTO: 0.3 %
BUN SERPL-MCNC: 21 MG/DL
CALCIUM SERPL-MCNC: 10 MG/DL
CHLORIDE SERPL-SCNC: 94 MMOL/L
CO2 SERPL-SCNC: 35 MMOL/L
CREAT SERPL-MCNC: 1.46 MG/DL
EOSINOPHIL # BLD AUTO: 0.15 K/UL
EOSINOPHIL NFR BLD AUTO: 2.3 %
GLUCOSE SERPL-MCNC: 96 MG/DL
HCT VFR BLD CALC: 32.4 %
HGB BLD-MCNC: 9.4 G/DL
IMM GRANULOCYTES NFR BLD AUTO: 0.3 %
LYMPHOCYTES # BLD AUTO: 1.17 K/UL
LYMPHOCYTES NFR BLD AUTO: 18.2 %
MAN DIFF?: NORMAL
MCHC RBC-ENTMCNC: 24.2 PG
MCHC RBC-ENTMCNC: 29 GM/DL
MCV RBC AUTO: 83.5 FL
MONOCYTES # BLD AUTO: 0.74 K/UL
MONOCYTES NFR BLD AUTO: 11.5 %
NEUTROPHILS # BLD AUTO: 4.32 K/UL
NEUTROPHILS NFR BLD AUTO: 67.4 %
PLATELET # BLD AUTO: 274 K/UL
POTASSIUM SERPL-SCNC: 4.5 MMOL/L
RBC # BLD: 3.88 M/UL
RBC # FLD: 15.9 %
SODIUM SERPL-SCNC: 139 MMOL/L
WBC # FLD AUTO: 6.42 K/UL

## 2021-08-02 ENCOUNTER — NON-APPOINTMENT (OUTPATIENT)
Age: 61
End: 2021-08-02

## 2021-08-02 ENCOUNTER — RX RENEWAL (OUTPATIENT)
Age: 61
End: 2021-08-02

## 2021-08-04 ENCOUNTER — APPOINTMENT (OUTPATIENT)
Dept: INTERNAL MEDICINE | Facility: CLINIC | Age: 61
End: 2021-08-04

## 2021-08-12 ENCOUNTER — APPOINTMENT (OUTPATIENT)
Dept: INTERNAL MEDICINE | Facility: CLINIC | Age: 61
End: 2021-08-12
Payer: COMMERCIAL

## 2021-08-12 DIAGNOSIS — Z87.01 PERSONAL HISTORY OF PNEUMONIA (RECURRENT): ICD-10-CM

## 2021-08-12 DIAGNOSIS — H69.83 OTHER SPECIFIED DISORDERS OF EUSTACHIAN TUBE, BILATERAL: ICD-10-CM

## 2021-08-12 LAB
ALBUMIN SERPL ELPH-MCNC: 4 G/DL
ALP BLD-CCNC: 55 U/L
ALT SERPL-CCNC: 23 U/L
ANION GAP SERPL CALC-SCNC: 14 MMOL/L
AST SERPL-CCNC: 18 U/L
BASOPHILS # BLD AUTO: 0.01 K/UL
BASOPHILS NFR BLD AUTO: 0.1 %
BILIRUB SERPL-MCNC: 0.2 MG/DL
BUN SERPL-MCNC: 34 MG/DL
CALCIUM SERPL-MCNC: 9.9 MG/DL
CHLORIDE SERPL-SCNC: 94 MMOL/L
CO2 SERPL-SCNC: 34 MMOL/L
CREAT SERPL-MCNC: 1.29 MG/DL
EOSINOPHIL # BLD AUTO: 0.05 K/UL
EOSINOPHIL NFR BLD AUTO: 0.5 %
GLUCOSE SERPL-MCNC: 105 MG/DL
HCT VFR BLD CALC: 33.8 %
HGB BLD-MCNC: 9.7 G/DL
IMM GRANULOCYTES NFR BLD AUTO: 0.4 %
LYMPHOCYTES # BLD AUTO: 1.41 K/UL
LYMPHOCYTES NFR BLD AUTO: 14.4 %
MAN DIFF?: NORMAL
MCHC RBC-ENTMCNC: 23.5 PG
MCHC RBC-ENTMCNC: 28.7 GM/DL
MCV RBC AUTO: 81.8 FL
MONOCYTES # BLD AUTO: 0.94 K/UL
MONOCYTES NFR BLD AUTO: 9.6 %
NEUTROPHILS # BLD AUTO: 7.36 K/UL
NEUTROPHILS NFR BLD AUTO: 75 %
PLATELET # BLD AUTO: 405 K/UL
POTASSIUM SERPL-SCNC: 4.6 MMOL/L
PROT SERPL-MCNC: 5.9 G/DL
RBC # BLD: 4.13 M/UL
RBC # FLD: 16 %
SODIUM SERPL-SCNC: 141 MMOL/L
WBC # FLD AUTO: 9.81 K/UL

## 2021-08-12 PROCEDURE — 99496 TRANSJ CARE MGMT HIGH F2F 7D: CPT | Mod: 95

## 2021-08-13 PROBLEM — Z87.01 HISTORY OF PNEUMONIA: Status: ACTIVE | Noted: 2021-06-09

## 2021-08-13 PROBLEM — H69.83 ETD (EUSTACHIAN TUBE DYSFUNCTION), BILATERAL: Status: ACTIVE | Noted: 2021-04-07

## 2021-08-13 NOTE — HISTORY OF PRESENT ILLNESS
[Post-hospitalization from ___ Hospital] : Post-hospitalization from [unfilled] Hospital [Admitted on: ___] : The patient was admitted on [unfilled] [Discharged on ___] : discharged on [unfilled] [Discharge Summary] : discharge summary [Discharge Med List] : discharge medication list [Med Reconciliation] : medication reconciliation has been completed [Patient Contacted By: ____] : and contacted by [unfilled] [FreeTextEntry2] : The patient reports that she was not feeling well.  She also had noticed chest discomfort.  Given the fact that she was having chest pain and was feeling poorly her brother called an ambulance and brought her to the emergency room at [facility].  She was admitted for 5 days.  She underwent a cardiac evaluation which was unremarkable.  She was evaluated by pulmonary.  She was treated with prednisone 20 mg daily for 5 days and she completed a short course of doxycycline.  She reports that she had a markedly reddened left eye.  This was felt to be due to breakage a small blood vessel in the eye with bleeding.  Today her eye is back to normal with complete resorption of the blood.  She reports that while hospitalized dark stools were noted.  No evaluation was done for this issue.\par She is very concerned.  Her brother also believes that her hemoglobin dropped from 9.2 or 9.3 to 8.1 while she was hospitalized.  He did give her her anemia her injection on Friday.  They are anxious to review her labs from yesterday.  She is on iron therapy.  Otherwise she offers no other complaints today.\par Her medication list was reviewed and reconciled.  Her recent discharge summary was reviewed and discussed in detail with the patient and her brother. [Home] : at home, [unfilled] , at the time of the visit. [Medical Office: ([facility])___] : at the medical office located in  [Family Member] : family member [Verbal consent obtained from patient] : the patient, [unfilled]

## 2021-08-13 NOTE — PHYSICAL EXAM
[No Acute Distress] : no acute distress [Well Nourished] : well nourished [Well Developed] : well developed [Ill-Appearing] : ill-appearing [No Respiratory Distress] : no respiratory distress  [No Accessory Muscle Use] : no accessory muscle use [No Edema] : there was no peripheral edema [Speech Grossly Normal] : speech grossly normal [Normal Affect] : the affect was normal [Alert and Oriented x3] : oriented to person, place, and time [de-identified] : Lying in bed; wearing nasal cannula oxygen [de-identified] : R=20; no audible stridor or wheezing noted [27221 - High Complexity requires an extensive number of possible diagnoses and/or the management options, extensive complexity of the medical data (tests, etc.) to be reviewed, and a high risk of significant complications, morbidity, and/or mortality as w] : High Complexity

## 2021-08-13 NOTE — HEALTH RISK ASSESSMENT
[Intercurrent ED visits] : went to ED [Intercurrent hospitalizations] : was admitted to the hospital  [] : No [No] : In the past 12 months have you used drugs other than those required for medical reasons? No [No falls in past year] : Patient reported no falls in the past year [Patient refused screening] : Patient refused screening [de-identified] : PT; hematology; pulmonary [de-identified] : PT [de-identified] : Soft regular diet [Patient declined mammogram] : Patient declined mammogram [Patient declined PAP Smear] : Patient declined PAP Smear [Patient declined bone density test] : Patient declined bone density test [Patient declined colonoscopy] : Patient declined colonoscopy [HIV test declined] : HIV test declined [Hepatitis C test declined] : Hepatitis C test declined [Change in mental status noted] : No change in mental status noted [Behavioral] : behavioral [Alone] : lives alone [# of Members in Household ___] :  household currently consist of [unfilled] member(s) [On disability] : on disability [High School] : high school [Single] : single [# Of Children ___] : has [unfilled] children [Feels Safe at Home] : Feels safe at home [Reports changes in hearing] : Reports no changes in hearing [Reports changes in vision] : Reports no changes in vision [Reports changes in dental health] : Reports no changes in dental health [Smoke Detector] : smoke detector [Carbon Monoxide Detector] : carbon monoxide detector [Guns at Home] : no guns at home [Seat Belt] :  uses seat belt [Sunscreen] : does not use sunscreen [Travel to Developing Areas] : does not  travel to developing areas [TB Exposure] : is not being exposed to tuberculosis [Caregiver Concerns] : does not have caregiver concerns [Reviewed no changes] : Reviewed, no changes [Designated Healthcare Proxy] : Designated healthcare proxy [Name: ___] : Health Care Proxy's Name: [unfilled]  [Relationship: ___] : Relationship: [unfilled] [AdvancecareDate] : 08/21

## 2021-08-16 ENCOUNTER — RX RENEWAL (OUTPATIENT)
Age: 61
End: 2021-08-16

## 2021-08-18 ENCOUNTER — LABORATORY RESULT (OUTPATIENT)
Age: 61
End: 2021-08-18

## 2021-08-19 ENCOUNTER — APPOINTMENT (OUTPATIENT)
Dept: INTERNAL MEDICINE | Facility: CLINIC | Age: 61
End: 2021-08-19
Payer: COMMERCIAL

## 2021-08-19 DIAGNOSIS — H11.32 CONJUNCTIVAL HEMORRHAGE, LEFT EYE: ICD-10-CM

## 2021-08-19 LAB
ANION GAP SERPL CALC-SCNC: 8 MMOL/L
BASOPHILS # BLD AUTO: 0.02 K/UL
BASOPHILS NFR BLD AUTO: 0.2 %
BUN SERPL-MCNC: 29 MG/DL
CALCIUM SERPL-MCNC: 9.1 MG/DL
CHLORIDE SERPL-SCNC: 97 MMOL/L
CO2 SERPL-SCNC: 34 MMOL/L
CREAT SERPL-MCNC: 1.52 MG/DL
EOSINOPHIL # BLD AUTO: 0.21 K/UL
EOSINOPHIL NFR BLD AUTO: 2.4 %
GLUCOSE SERPL-MCNC: 141 MG/DL
HCT VFR BLD CALC: 33 %
HGB BLD-MCNC: 8.8 G/DL
IMM GRANULOCYTES NFR BLD AUTO: 0.6 %
LYMPHOCYTES # BLD AUTO: 1.59 K/UL
LYMPHOCYTES NFR BLD AUTO: 17.9 %
MAN DIFF?: NORMAL
MCHC RBC-ENTMCNC: 22.4 PG
MCHC RBC-ENTMCNC: 26.7 GM/DL
MCV RBC AUTO: 84.2 FL
MONOCYTES # BLD AUTO: 1.03 K/UL
MONOCYTES NFR BLD AUTO: 11.6 %
NEUTROPHILS # BLD AUTO: 5.98 K/UL
NEUTROPHILS NFR BLD AUTO: 67.3 %
PLATELET # BLD AUTO: 260 K/UL
POTASSIUM SERPL-SCNC: 4.4 MMOL/L
RBC # BLD: 3.92 M/UL
RBC # FLD: 16.7 %
SODIUM SERPL-SCNC: 139 MMOL/L
WBC # FLD AUTO: 8.88 K/UL

## 2021-08-19 PROCEDURE — 99214 OFFICE O/P EST MOD 30 MIN: CPT | Mod: 95

## 2021-08-19 RX ORDER — AZITHROMYCIN 250 MG/1
250 TABLET, FILM COATED ORAL
Qty: 15 | Refills: 0 | Status: DISCONTINUED | COMMUNITY
Start: 2019-03-21 | End: 2021-08-19

## 2021-08-19 RX ORDER — ALBUTEROL SULFATE 2.5 MG/3ML
(2.5 MG/3ML) SOLUTION RESPIRATORY (INHALATION) 4 TIMES DAILY
Qty: 6 | Refills: 3 | Status: DISCONTINUED | COMMUNITY
Start: 2019-10-17 | End: 2021-08-19

## 2021-08-19 RX ORDER — OFLOXACIN 3 MG/ML
0.3 SOLUTION/ DROPS OPHTHALMIC 4 TIMES DAILY
Qty: 1 | Refills: 0 | Status: DISCONTINUED | COMMUNITY
Start: 2021-05-11 | End: 2021-08-19

## 2021-08-19 RX ORDER — METOLAZONE 2.5 MG/1
2.5 TABLET ORAL DAILY
Qty: 36 | Refills: 2 | Status: DISCONTINUED | COMMUNITY
Start: 2020-07-01 | End: 2021-08-19

## 2021-08-19 RX ORDER — NYSTATIN 100000 [USP'U]/ML
100000 SUSPENSION ORAL
Refills: 0 | Status: DISCONTINUED | COMMUNITY
Start: 2021-06-02 | End: 2021-08-19

## 2021-08-19 RX ORDER — IPRATROPIUM BROMIDE 0.5 MG/2.5ML
0.02 SOLUTION RESPIRATORY (INHALATION) 4 TIMES DAILY
Qty: 120 | Refills: 5 | Status: DISCONTINUED | COMMUNITY
Start: 2021-04-19 | End: 2021-08-19

## 2021-08-19 RX ORDER — METOLAZONE 2.5 MG/1
2.5 TABLET ORAL DAILY
Qty: 30 | Refills: 0 | Status: DISCONTINUED | COMMUNITY
Start: 2020-07-23 | End: 2021-08-19

## 2021-08-19 RX ORDER — PREDNISONE 10 MG/1
10 TABLET ORAL
Refills: 0 | Status: DISCONTINUED | COMMUNITY
Start: 2021-06-02 | End: 2021-08-19

## 2021-08-19 RX ORDER — CEFUROXIME AXETIL 250 MG/1
250 TABLET ORAL
Qty: 20 | Refills: 0 | Status: DISCONTINUED | COMMUNITY
Start: 2021-05-11 | End: 2021-08-19

## 2021-08-19 RX ORDER — DILTIAZEM HYDROCHLORIDE 300 MG/1
300 TABLET, EXTENDED RELEASE ORAL DAILY
Qty: 90 | Refills: 1 | Status: DISCONTINUED | COMMUNITY
Start: 2020-12-16 | End: 2021-08-19

## 2021-08-19 RX ORDER — PREDNISONE 10 MG/1
10 TABLET ORAL
Qty: 90 | Refills: 2 | Status: DISCONTINUED | COMMUNITY
Start: 2020-04-20 | End: 2021-08-19

## 2021-08-19 NOTE — HEALTH RISK ASSESSMENT
[No] : In the past 12 months have you used drugs other than those required for medical reasons? No [No falls in past year] : Patient reported no falls in the past year [Patient refused screening] : Patient refused screening [] : No [de-identified] : Physical therapy [de-identified] : Regular

## 2021-08-19 NOTE — ASSESSMENT
[FreeTextEntry1] : Bleeding–left eye\par She does not notice any loss of vision or actual discharge\par Likely related to broken blood vessel\par We will try to arrange for home ophthalmology evaluation to exclude any other underlying etiology and to reassure patient\par Monitor closely\par Can apply warm soaks\par She does not feel that she can go to an ophthalmology office and refuses to go to the ER and be seen by ophthalmology there\par \par The patient has end-stage emphysema\par Lung transplantation has been declined by [facility] and [facility]\par She will continue BiPAP therapy 24 hours/day\par She will continue oxygen 24 hours/day\par She will continue Symbicort, Spiriva, Singulair\par Off Prednisone\par She will continue nebulizer therapy every 4-6 hours\par She will use an Acapella device to assist with secretion clearance\par If this is ineffective then we will order a smart vest to assist with secretion clearance\par Mucinex bid or\par She will add Mucomyst 10% solution 2 cc to her nebulizer therapy twice daily\par Hold Zithromax for now\par She declines further chest CT/PFTs\par She had 2020 flu vaccine and Covid vaccine\par She no longer smokes\par PT as tolerated\par Cardiology follow-up\par Will complete [employer] form\par \par DM\par FSG's tid prior to meals\par Weight control\par ADA diet\par Continue present medications\par Ophtho and podiatry declined\par Endocrine follow-up declined\par \par HTN\par She reports good BP control\par No recurrent AF or SVT\par Continue diltiazem, Eliquis, Brilinta\par Close f/u of CR/BUN = with no increased edema\par Continue Lasix 40 mg daily for now\par Cardiology follow-up\par \par Hyperlipidemia\par Low fat diet\par Weight control\par On daily Crestor\par Monitor lipid profile\par \par Anemia\par HGB 8.8\par To do fit test\par DIDI support planned by Dr. Townsend\par Hematology f/u\par Transfuse as needed\par We will closely monitor CBC\par \par She will do a TEB visit in 1- 2 weeks and as needed\par She will call if her status worsens \par She will call for any medical or pulmonary issues\par All of her questions were answered\par All of the above was d/w her and her brother\par She and her brother verbally confirmed understanding of all of the above and agreement with the above plan\par We will arrange a home blood draw for next week

## 2021-08-19 NOTE — PHYSICAL EXAM
[No Acute Distress] : no acute distress [Well Nourished] : well nourished [Well Developed] : well developed [Well-Appearing] : well-appearing [No Respiratory Distress] : no respiratory distress  [No Accessory Muscle Use] : no accessory muscle use [Speech Grossly Normal] : speech grossly normal [Normal Affect] : the affect was normal [Alert and Oriented x3] : oriented to person, place, and time [Normal Voice/Communication] : normal voice/communication [de-identified] : Dressed and sitting in chair; wearing nasal cannula oxygen [de-identified] : Bloodshot left eye [de-identified] :  no audible stridor or wheezing noted

## 2021-08-19 NOTE — HISTORY OF PRESENT ILLNESS
[Home] : at home, [unfilled] , at the time of the visit. [Medical Office: ([facility])___] : at the medical office located in  [Family Member] : family member [Verbal consent obtained from patient] : the patient, [unfilled] [FreeTextEntry8] : Recent labs from August 18, 2021 reviewed with the patient and Michele in detail.  She received another injection on Friday to support her hemoglobin.  She requires a form for [employer] completed.  She will fax to me and I will complete and fax back to her.  She remains concerned over a recurrent bleed into her left eye.  It seems to be improving again but she is concerned that there is an underlying issue.  She reports no increase in her edema. She does have good urine output and adequate BM's.  She is doing physical therapy. She denies any new abdominal pain or nausea/vomiting.  She denies any black or bloody stools.  She denies any UTI symptoms.  She denies any orthopnea or PND.  She denies any calf pain.  She denies any fevers or diaphoresis.  She denies any new chest pain or palpitations.  Her breathing has been stable.  Over the last 1 to 2 days she has noticed that her breathing may be slightly more shallow.  She denies any increased cough or  hemoptysis. She has a good appetite.  She has been in relatively good spirits.

## 2021-08-19 NOTE — REVIEW OF SYSTEMS
[Dryness] : dryness  [Vision Problems] : vision problems [Postnasal Drip] : postnasal drip [Leg Claudication] : leg claudication [Shortness Of Breath] : shortness of breath [Dyspnea on Exertion] : dyspnea on exertion [Constipation] : constipation [Heartburn] : heartburn [Joint Pain] : joint pain [Muscle Weakness] : muscle weakness [Muscle Pain] : muscle pain [Back Pain] : back pain [Unsteady Walking] : ataxia [Anxiety] : anxiety [Easy Bruising] : easy bruising [Negative] : Heme/Lymph [Redness] : redness [Nosebleed] : nosebleed

## 2021-08-25 ENCOUNTER — LABORATORY RESULT (OUTPATIENT)
Age: 61
End: 2021-08-25

## 2021-08-26 LAB
ANION GAP SERPL CALC-SCNC: 13 MMOL/L
BASOPHILS # BLD AUTO: 0.02 K/UL
BASOPHILS NFR BLD AUTO: 0.4 %
BUN SERPL-MCNC: 24 MG/DL
CALCIUM SERPL-MCNC: 10 MG/DL
CHLORIDE SERPL-SCNC: 94 MMOL/L
CO2 SERPL-SCNC: 33 MMOL/L
CREAT SERPL-MCNC: 1.7 MG/DL
EOSINOPHIL # BLD AUTO: 0.15 K/UL
EOSINOPHIL NFR BLD AUTO: 2.7 %
GLUCOSE SERPL-MCNC: 158 MG/DL
HCT VFR BLD CALC: 33 %
HGB BLD-MCNC: 8.9 G/DL
IMM GRANULOCYTES NFR BLD AUTO: 0.5 %
LYMPHOCYTES # BLD AUTO: 0.9 K/UL
LYMPHOCYTES NFR BLD AUTO: 16 %
MAN DIFF?: NORMAL
MCHC RBC-ENTMCNC: 22.6 PG
MCHC RBC-ENTMCNC: 27 GM/DL
MCV RBC AUTO: 83.8 FL
MONOCYTES # BLD AUTO: 0.69 K/UL
MONOCYTES NFR BLD AUTO: 12.2 %
NEUTROPHILS # BLD AUTO: 3.85 K/UL
NEUTROPHILS NFR BLD AUTO: 68.2 %
PLATELET # BLD AUTO: 227 K/UL
POTASSIUM SERPL-SCNC: 4.3 MMOL/L
RBC # BLD: 3.94 M/UL
RBC # FLD: 17.4 %
SODIUM SERPL-SCNC: 140 MMOL/L
WBC # FLD AUTO: 5.64 K/UL

## 2021-09-03 LAB
25(OH)D3 SERPL-MCNC: 79.3 NG/ML
ALBUMIN SERPL ELPH-MCNC: 3.7 G/DL
ALP BLD-CCNC: 65 U/L
ALT SERPL-CCNC: 6 U/L
ANION GAP SERPL CALC-SCNC: 12 MMOL/L
AST SERPL-CCNC: 17 U/L
BASOPHILS # BLD AUTO: 0.03 K/UL
BASOPHILS NFR BLD AUTO: 0.5 %
BILIRUB SERPL-MCNC: 0.3 MG/DL
BUN SERPL-MCNC: 21 MG/DL
CALCIUM SERPL-MCNC: 10.4 MG/DL
CHLORIDE SERPL-SCNC: 92 MMOL/L
CHOLEST SERPL-MCNC: 109 MG/DL
CO2 SERPL-SCNC: 34 MMOL/L
CREAT SERPL-MCNC: 1.62 MG/DL
EOSINOPHIL # BLD AUTO: 0.29 K/UL
EOSINOPHIL NFR BLD AUTO: 4.9 %
ESTIMATED AVERAGE GLUCOSE: 157 MG/DL
FOLATE SERPL-MCNC: >20 NG/ML
GLUCOSE SERPL-MCNC: 119 MG/DL
HBA1C MFR BLD HPLC: 7.1 %
HCT VFR BLD CALC: 34.8 %
HDLC SERPL-MCNC: 35 MG/DL
HGB BLD-MCNC: 9.2 G/DL
IMM GRANULOCYTES NFR BLD AUTO: 0.3 %
IRON SERPL-MCNC: 27 UG/DL
LDLC SERPL CALC-MCNC: 54 MG/DL
LYMPHOCYTES # BLD AUTO: 1.34 K/UL
LYMPHOCYTES NFR BLD AUTO: 22.8 %
MAN DIFF?: NORMAL
MCHC RBC-ENTMCNC: 22 PG
MCHC RBC-ENTMCNC: 26.4 GM/DL
MCV RBC AUTO: 83.3 FL
MONOCYTES # BLD AUTO: 0.84 K/UL
MONOCYTES NFR BLD AUTO: 14.3 %
NEUTROPHILS # BLD AUTO: 3.35 K/UL
NEUTROPHILS NFR BLD AUTO: 57.2 %
NONHDLC SERPL-MCNC: 74 MG/DL
PLATELET # BLD AUTO: 425 K/UL
POTASSIUM SERPL-SCNC: 4.3 MMOL/L
PROT SERPL-MCNC: 6.5 G/DL
RBC # BLD: 4.18 M/UL
RBC # FLD: 18.8 %
SODIUM SERPL-SCNC: 138 MMOL/L
TRIGL SERPL-MCNC: 99 MG/DL
TSH SERPL-ACNC: 2.03 UIU/ML
VIT B12 SERPL-MCNC: 727 PG/ML
WBC # FLD AUTO: 5.87 K/UL

## 2021-09-08 ENCOUNTER — LABORATORY RESULT (OUTPATIENT)
Age: 61
End: 2021-09-08

## 2021-09-10 ENCOUNTER — LABORATORY RESULT (OUTPATIENT)
Age: 61
End: 2021-09-10

## 2021-09-10 NOTE — ASSESSMENT
Agree with resident notes by Dr. Arita and Mane, except for modifications as noted below.  Patient independently interviewed and patient examined.    41 year old female h/o HFpEF, Afib, mech avr / mvr 2019 presents with cough and fever. Began feeling some chills, malaise, and worsening cough 9/7. Symptoms progressed. Felt sweaty at times. On 9/9 had right sided CP and SIMEON, the CP was positional and non exertional and non pleuritic.    Denies vomiting, diarrhea, dysuria, rash    Vitals:    09/10/21 1325   BP: 117/65   Pulse: 82   Resp: (!) 23   Temp:      Exam shows  No apparent distress, heart regular, lungs clear, belly soft, alert, fluent speech  + small right Pleff  + possible small pericardial effusion    Diagnostics:  EKG NSR  CT chest multifocal consolidations R > L    Assessment:    - Acute community acquired pneumonia with severe infiltrates, has h/o recurrent PNA that could have been misdiagnosed heart failure per patient  - Diastolic CHF, chronic, compensated  - Mechanical MVR and AVR since 2019, cause unknown  - Tiny right Pleff  - Iron deficiency anemia and anemia of chronic disease  - Chronic cough in former smoker    Plan:    IV CTX and Azithromycin  Echo to check for effusion and valves status  Should have PFTs as outpatient for chronic cough    Patient verbalized understanding of the therapeutic plan, as well as alternative options and therapies, and had an opportunity to ask questions.      All questions answered, and patient verbalized understanding.    There was no evidence of cognitive impairment or comprehension issues, or other barriers to understanding.      [FreeTextEntry1] : [facility]\par Last CR stable at 1.17 and Potassium =4.6 and WNL\par Repeat CMP sent today\par Low potassium diet\par Must use BIPAP for 6-8 hours daily to drive down PCO2\par Remain off Benicar\par On Lasix 40 mg daily as per Dr. Chapa\par \par DM\par Has been in good control\par Last HGBA1C = 5.4%\par Off Metformin\par ME\par Weight control\par ADA diet\par Podiatry and Ophtho f/u\par Monitor glucose, HGBA1C\par Monitor FSG's\par Continue low fat diet and daily statin\par \par HTN\par BP in good range today\par Low salt diet\par Weight control\par Remains on Cardizem 180 mg daily\par Cardiology f/u with Dr. Chapa\par \par COPD\par End-stage\par Awaiting possible lung transplant\par Continue oxygen 3 LPM / 4 LPM with exercise\par BIPAP 6-8 hours per day - will change settings to 15/5\par ME\par Will monitor CT/PFT's\par Had flu vaccine\par No smoking\par Continue Symbicort, Spiriva, Singulair, Charan\par Nebs qid\par Zithromax daily\par Mucinex DM bid\par \par AF\par On Cardizem\par On Eliquis\par Cardiology f/u for ?Holter given occasional palpitations\par Vascular f/u with Dopplers planned\par \par RTC 4-6 weeks and as needed\par To call for any medical issues\par F/U at [facility] - virtual colonoscopy planned\par Will see nutrition for weight control\par Dental f/u regarding dental extractions\par Saw ophtho for DM eye exam\par U/A with micro and urine C&S sent for possible UTI /// full labs sent\par Psych f/u or AD declined

## 2021-09-15 ENCOUNTER — APPOINTMENT (OUTPATIENT)
Dept: INTERNAL MEDICINE | Facility: CLINIC | Age: 61
End: 2021-09-15
Payer: COMMERCIAL

## 2021-09-15 ENCOUNTER — RX RENEWAL (OUTPATIENT)
Age: 61
End: 2021-09-15

## 2021-09-15 DIAGNOSIS — R63.0 ANOREXIA: ICD-10-CM

## 2021-09-15 LAB
ANION GAP SERPL CALC-SCNC: 9 MMOL/L
BASOPHILS # BLD AUTO: 0.03 K/UL
BASOPHILS NFR BLD AUTO: 0.4 %
BUN SERPL-MCNC: 23 MG/DL
CALCIUM SERPL-MCNC: 9.4 MG/DL
CHLORIDE SERPL-SCNC: 98 MMOL/L
CO2 SERPL-SCNC: 32 MMOL/L
CREAT SERPL-MCNC: 1.25 MG/DL
EOSINOPHIL # BLD AUTO: 0.15 K/UL
EOSINOPHIL NFR BLD AUTO: 2 %
GLUCOSE SERPL-MCNC: 223 MG/DL
HCT VFR BLD CALC: 37.8 %
HGB BLD-MCNC: 10.1 G/DL
IMM GRANULOCYTES NFR BLD AUTO: 0.8 %
LYMPHOCYTES # BLD AUTO: 1.11 K/UL
LYMPHOCYTES NFR BLD AUTO: 14.7 %
MAN DIFF?: NORMAL
MCHC RBC-ENTMCNC: 21.9 PG
MCHC RBC-ENTMCNC: 26.7 GM/DL
MCV RBC AUTO: 81.8 FL
MONOCYTES # BLD AUTO: 0.99 K/UL
MONOCYTES NFR BLD AUTO: 13.1 %
NEUTROPHILS # BLD AUTO: 5.23 K/UL
NEUTROPHILS NFR BLD AUTO: 69 %
PLATELET # BLD AUTO: 354 K/UL
POTASSIUM SERPL-SCNC: 5.1 MMOL/L
RBC # BLD: 4.62 M/UL
RBC # FLD: 17.9 %
SODIUM SERPL-SCNC: 139 MMOL/L
WBC # FLD AUTO: 7.57 K/UL

## 2021-09-15 PROCEDURE — 99214 OFFICE O/P EST MOD 30 MIN: CPT | Mod: 95

## 2021-09-16 PROBLEM — R63.0 ANOREXIA: Status: RESOLVED | Noted: 2021-04-07 | Resolved: 2021-09-16

## 2021-09-16 RX ORDER — DOXYCYCLINE HYCLATE 100 MG/1
100 TABLET ORAL
Qty: 12 | Refills: 0 | Status: COMPLETED | COMMUNITY
Start: 2021-08-06

## 2021-09-16 NOTE — ASSESSMENT
[FreeTextEntry1] : The patient has end-stage emphysema\par Lung transplantation has been declined by [facility] and [facility]\par She will continue BiPAP therapy 24 hours/day\par She will continue oxygen 24 hours/day\par She will continue Symbicort, Spiriva, Singulair\par Off Prednisone\par She will continue nebulizer therapy every 4-6 hours\par She will use an Acapella device to assist with secretion clearance as needed\par If this is ineffective then we will order a smart vest to assist with secretion clearance\par Mucinex bid or\par She will add Mucomyst 10% solution 2 cc to her nebulizer therapy twice daily as needed\par Back on Zithromax tiw\par She declines further chest CT/PFTs\par She had 2020 flu vaccine and Covid vaccine and pneumococcal vaccine\par Will need to arrange for 2021 flu vaccine for patient\par She no longer smokes\par PT as tolerated\par Cardiology follow-up\par \par DM\par FSG's tid prior to meals\par Weight control\par ADA diet\par Continue present medications = will f/u on approval for Januvia\par Ophtho and podiatry declined\par Endocrine follow-up declined\par \par HTN\par She reports good BP control - occasional high reading\par No recurrent AF or SVT\par Continue diltiazem, Eliquis, Brilinta\par Close f/u of CR/BUN = with no increased edema\par Continue Lasix 40 mg qod for now\par Cardiology follow-up\par \par Hyperlipidemia\par Low fat diet\par Weight control\par On daily Crestor\par Monitor lipid profile\par \par Anemia\par HGB 10.1\par Guaiac testing was negative\par Back on daily iron\par DIDI support as per Dr. Townsend = will hold this week as HGB over 10\par Hematology f/u\par Transfuse as needed\par We will closely monitor CBC, iron level\par \par Hair loss\par Suspect due to recent Covid 19 infection\par Will check labs\par Will set up TEB with Derm\par \par Reduced appetite\par ?etiology\par ?medication related\par Will do f/u labs\par May need TEB with GI\par Small meals through day\par Protein shakes/nutritional supplements if can't tolerate solid foods\par Soft diet\par \par She will do a TEB visit in 2 weeks and as needed\par She will call if her status worsens \par She will call for any medical or pulmonary issues\par All of her questions were answered\par All of the above was d/w her and her brother\par She and her brother verbally confirmed understanding of all of the above and agreement with the above plan\par We will arrange a home blood draw for next week

## 2021-09-16 NOTE — HEALTH RISK ASSESSMENT
[No] : In the past 12 months have you used drugs other than those required for medical reasons? No [No falls in past year] : Patient reported no falls in the past year [Patient refused screening] : Patient refused screening [] : No [de-identified] : Physical therapy [de-identified] : Regular

## 2021-09-16 NOTE — HISTORY OF PRESENT ILLNESS
[Home] : at home, [unfilled] , at the time of the visit. [Medical Office: ([facility])___] : at the medical office located in  [Family Member] : family member [Verbal consent obtained from patient] : the patient, [unfilled] [FreeTextEntry8] : 9/14/21 labs reviewed with the patient and Michele in detail.  No recurrent eye bleeds. Tried but unable to get her home ophtho visit due to insurance issues and pandemic restrictions. Having hair loss. ? due to covid 19. Having intermittent high BP readings. Still waiting word on Januvia. Appetite not as good lately. Edema due to steroid use has resolved. She reports no increase in her edema presently. She does have good urine output and adequate BM's.  She is doing physical therapy. She denies any new abdominal pain or nausea/vomiting.  She denies any black or bloody stools.  She denies any UTI symptoms.  She denies any orthopnea or PND.  She denies any calf pain.  She denies any fevers or diaphoresis.  She denies any new chest pain or palpitations.  Her breathing has been stable. She denies any increased cough or  hemoptysis. She has been in relatively good spirits.

## 2021-09-16 NOTE — REVIEW OF SYSTEMS
[Vision Problems] : vision problems [Postnasal Drip] : postnasal drip [Leg Claudication] : leg claudication [Shortness Of Breath] : shortness of breath [Dyspnea on Exertion] : dyspnea on exertion [Heartburn] : heartburn [Joint Pain] : joint pain [Muscle Weakness] : muscle weakness [Muscle Pain] : muscle pain [Back Pain] : back pain [Hair Changes] : hair changes [Unsteady Walking] : ataxia [Anxiety] : anxiety [Easy Bruising] : easy bruising [Negative] : Heme/Lymph

## 2021-09-16 NOTE — PHYSICAL EXAM
[No Acute Distress] : no acute distress [Well Nourished] : well nourished [Well Developed] : well developed [Well-Appearing] : well-appearing [Normal Voice/Communication] : normal voice/communication [No Respiratory Distress] : no respiratory distress  [No Accessory Muscle Use] : no accessory muscle use [Speech Grossly Normal] : speech grossly normal [Normal Affect] : the affect was normal [Alert and Oriented x3] : oriented to person, place, and time [de-identified] : Dressed and sitting in chair; wearing nasal cannula oxygen [de-identified] :  no audible stridor or wheezing noted

## 2021-09-21 ENCOUNTER — LABORATORY RESULT (OUTPATIENT)
Age: 61
End: 2021-09-21

## 2021-09-21 RX ORDER — EMPAGLIFLOZIN 10 MG/1
10 TABLET, FILM COATED ORAL
Qty: 90 | Refills: 1 | Status: DISCONTINUED | COMMUNITY
Start: 2021-07-02 | End: 2021-09-21

## 2021-09-22 LAB
25(OH)D3 SERPL-MCNC: 74.1 NG/ML
ALBUMIN SERPL ELPH-MCNC: 3.6 G/DL
ALP BLD-CCNC: 65 U/L
ALT SERPL-CCNC: 11 U/L
ANION GAP SERPL CALC-SCNC: 10 MMOL/L
AST SERPL-CCNC: 14 U/L
BASOPHILS # BLD AUTO: 0.04 K/UL
BASOPHILS NFR BLD AUTO: 0.6 %
BILIRUB SERPL-MCNC: 0.2 MG/DL
BUN SERPL-MCNC: 19 MG/DL
CALCIUM SERPL-MCNC: 9.2 MG/DL
CHLORIDE SERPL-SCNC: 96 MMOL/L
CO2 SERPL-SCNC: 33 MMOL/L
CREAT SERPL-MCNC: 1.42 MG/DL
EOSINOPHIL # BLD AUTO: 0.25 K/UL
EOSINOPHIL NFR BLD AUTO: 3.7 %
ESTIMATED AVERAGE GLUCOSE: 160 MG/DL
FOLATE SERPL-MCNC: 15.8 NG/ML
GLUCOSE SERPL-MCNC: 239 MG/DL
HBA1C MFR BLD HPLC: 7.2 %
HCT VFR BLD CALC: 35.3 %
HGB BLD-MCNC: 9.6 G/DL
IMM GRANULOCYTES NFR BLD AUTO: 0.4 %
IRON SERPL-MCNC: 25 UG/DL
LYMPHOCYTES # BLD AUTO: 1.12 K/UL
LYMPHOCYTES NFR BLD AUTO: 16.6 %
MAN DIFF?: NORMAL
MCHC RBC-ENTMCNC: 22.3 PG
MCHC RBC-ENTMCNC: 27.2 GM/DL
MCV RBC AUTO: 82.1 FL
MONOCYTES # BLD AUTO: 0.91 K/UL
MONOCYTES NFR BLD AUTO: 13.5 %
NEUTROPHILS # BLD AUTO: 4.4 K/UL
NEUTROPHILS NFR BLD AUTO: 65.2 %
PLATELET # BLD AUTO: 219 K/UL
POTASSIUM SERPL-SCNC: 5.3 MMOL/L
PROT SERPL-MCNC: 5.7 G/DL
RBC # BLD: 4.3 M/UL
RBC # FLD: 17.2 %
SODIUM SERPL-SCNC: 139 MMOL/L
TSH SERPL-ACNC: 1.91 UIU/ML
VIT B12 SERPL-MCNC: 791 PG/ML
WBC # FLD AUTO: 6.75 K/UL

## 2021-09-27 ENCOUNTER — LABORATORY RESULT (OUTPATIENT)
Age: 61
End: 2021-09-27

## 2021-09-29 ENCOUNTER — APPOINTMENT (OUTPATIENT)
Dept: INTERNAL MEDICINE | Facility: CLINIC | Age: 61
End: 2021-09-29
Payer: COMMERCIAL

## 2021-09-29 DIAGNOSIS — R63.0 ANOREXIA: ICD-10-CM

## 2021-09-29 LAB
ANION GAP SERPL CALC-SCNC: 13 MMOL/L
BASOPHILS # BLD AUTO: 0.02 K/UL
BASOPHILS NFR BLD AUTO: 0.4 %
BUN SERPL-MCNC: 16 MG/DL
CALCIUM SERPL-MCNC: 9.8 MG/DL
CHLORIDE SERPL-SCNC: 98 MMOL/L
CO2 SERPL-SCNC: 31 MMOL/L
CREAT SERPL-MCNC: 1.54 MG/DL
EOSINOPHIL # BLD AUTO: 0.26 K/UL
EOSINOPHIL NFR BLD AUTO: 5.8 %
GLUCOSE SERPL-MCNC: 114 MG/DL
HCT VFR BLD CALC: 35.3 %
HGB BLD-MCNC: 9.6 G/DL
IMM GRANULOCYTES NFR BLD AUTO: 0.7 %
LYMPHOCYTES # BLD AUTO: 1.25 K/UL
LYMPHOCYTES NFR BLD AUTO: 27.9 %
MAN DIFF?: NORMAL
MCHC RBC-ENTMCNC: 21.7 PG
MCHC RBC-ENTMCNC: 27.2 GM/DL
MCV RBC AUTO: 79.9 FL
MONOCYTES # BLD AUTO: 0.49 K/UL
MONOCYTES NFR BLD AUTO: 10.9 %
NEUTROPHILS # BLD AUTO: 2.43 K/UL
NEUTROPHILS NFR BLD AUTO: 54.3 %
PLATELET # BLD AUTO: 259 K/UL
POTASSIUM SERPL-SCNC: 4.7 MMOL/L
RBC # BLD: 4.42 M/UL
RBC # FLD: 16.8 %
SODIUM SERPL-SCNC: 142 MMOL/L
WBC # FLD AUTO: 4.48 K/UL

## 2021-09-29 PROCEDURE — 99214 OFFICE O/P EST MOD 30 MIN: CPT | Mod: 95

## 2021-09-29 RX ORDER — CEFUROXIME AXETIL 250 MG/1
250 TABLET ORAL
Qty: 14 | Refills: 0 | Status: DISCONTINUED | COMMUNITY
Start: 2021-09-03 | End: 2021-09-29

## 2021-09-29 RX ORDER — METFORMIN HYDROCHLORIDE 500 MG/1
500 TABLET, COATED ORAL
Qty: 360 | Refills: 0 | Status: DISCONTINUED | COMMUNITY
Start: 2020-01-10 | End: 2021-09-29

## 2021-09-29 NOTE — PHYSICAL EXAM
[No Acute Distress] : no acute distress [Well Nourished] : well nourished [Well Developed] : well developed [Well-Appearing] : well-appearing [Normal Voice/Communication] : normal voice/communication [No Respiratory Distress] : no respiratory distress  [No Accessory Muscle Use] : no accessory muscle use [Speech Grossly Normal] : speech grossly normal [Normal Affect] : the affect was normal [Alert and Oriented x3] : oriented to person, place, and time [de-identified] :  no audible stridor or wheezing noted [de-identified] : Dressed and sitting in bed; wearing nasal cannula oxygen

## 2021-09-29 NOTE — ASSESSMENT
[FreeTextEntry1] : The patient has end-stage emphysema\par Lung transplantation has been declined by [facility] and [facility]\par She will continue BiPAP therapy 24 hours/day\par She will continue oxygen 24 hours/day\par She will continue Symbicort, Spiriva, Singulair\par Off Prednisone\par She will continue nebulizer therapy every 4-6 hours\par She will use an Acapella device to assist with secretion clearance as needed\par If this is ineffective then we will order a smart vest to assist with secretion clearance\par Mucinex bid or\par She will add Mucomyst 10% solution 2 cc to her nebulizer therapy twice daily as needed\par Back on Zithromax tiw\par She declines further chest CT/PFTs\par She had 2020 flu vaccine and Covid vaccine and pneumococcal vaccine\par Will need to arrange for 2021 flu vaccine for patient\par She no longer smokes\par PT as tolerated\par Cardiology follow-up\par \par DM\par FSG's tid prior to meals\par Weight control\par ADA diet\par Continue present medications = now switched to Januvia\par Ophtho and podiatry declined\par Endocrine follow-up declined\par \par HTN\par She reports good BP control \par No recurrent AF or SVT\par Continue diltiazem, Eliquis, Brilinta\par Close f/u of CR/BUN = with no increased edema\par Continue Lasix 40 mg qod for now\par Cardiology follow-up\par \par Hyperlipidemia\par Low fat diet\par Weight control\par On daily Crestor\par Monitor lipid profile\par \par Anemia\par HGB 9.6\par Guaiac testing was negative\par Back on daily iron\par DIDI support as per Dr. Townsend = will give this week\par Hematology f/u\par Transfuse as needed\par We will closely monitor CBC, iron level\par \par Reduced appetite\par ?etiology\par ?medication related\par Will do f/u labs\par May need TEB with GI\par Small meals through day\par Protein shakes/nutritional supplements if can't tolerate solid foods\par Soft diet\par \par She will do a TEB visit in 2 weeks and as needed\par She will call if her status worsens \par She will call for any medical or pulmonary issues\par All of her questions were answered\par All of the above was d/w her and her brother\par She and her brother verbally confirmed understanding of all of the above and agreement with the above plan\par We will arrange a home blood draw for next week

## 2021-09-29 NOTE — HISTORY OF PRESENT ILLNESS
[Home] : at home, [unfilled] , at the time of the visit. [Medical Office: ([facility])___] : at the medical office located in  [Family Member] : family member [Verbal consent obtained from patient] : the patient, [unfilled] [FreeTextEntry8] : 9/27/21 labs reviewed with the patient and Michele in detail.  No recurrent eye bleeds.  Eyes tear a lot.  On Januvia. Appetite fair= she supplements her meals with [place] instant breakfast and Glucerna.  She reports no increase in her edema presently. She does have good urine output and adequate BM's.  She is doing physical therapy. She denies any new abdominal pain or nausea/vomiting.  She denies any black or bloody stools.  She denies any UTI symptoms.  She denies any orthopnea or PND.  She denies any calf pain.  She denies any fevers or diaphoresis.  She denies any new chest pain or palpitations.  Her breathing has been stable. She denies any increased cough or  hemoptysis. She has been in relatively good spirits.  Her only complaint today is that she has chronic fatigue.

## 2021-10-04 ENCOUNTER — LABORATORY RESULT (OUTPATIENT)
Age: 61
End: 2021-10-04

## 2021-10-07 ENCOUNTER — RX RENEWAL (OUTPATIENT)
Age: 61
End: 2021-10-07

## 2021-10-07 LAB
ALBUMIN SERPL ELPH-MCNC: 3.7 G/DL
ALP BLD-CCNC: 68 U/L
ALT SERPL-CCNC: 8 U/L
ANION GAP SERPL CALC-SCNC: 9 MMOL/L
AST SERPL-CCNC: 11 U/L
BASOPHILS # BLD AUTO: 0.03 K/UL
BASOPHILS NFR BLD AUTO: 0.5 %
BILIRUB SERPL-MCNC: <0.2 MG/DL
BUN SERPL-MCNC: 14 MG/DL
CALCIUM SERPL-MCNC: 10.2 MG/DL
CHLORIDE SERPL-SCNC: 98 MMOL/L
CO2 SERPL-SCNC: 36 MMOL/L
CREAT SERPL-MCNC: 1.44 MG/DL
EOSINOPHIL # BLD AUTO: 0.28 K/UL
EOSINOPHIL NFR BLD AUTO: 5.1 %
GLUCOSE SERPL-MCNC: 143 MG/DL
HCT VFR BLD CALC: 36.7 %
HGB BLD-MCNC: 10 G/DL
IMM GRANULOCYTES NFR BLD AUTO: 0.4 %
LYMPHOCYTES # BLD AUTO: 1.16 K/UL
LYMPHOCYTES NFR BLD AUTO: 20.9 %
MAN DIFF?: NORMAL
MCHC RBC-ENTMCNC: 22.1 PG
MCHC RBC-ENTMCNC: 27.2 GM/DL
MCV RBC AUTO: 81 FL
MONOCYTES # BLD AUTO: 0.57 K/UL
MONOCYTES NFR BLD AUTO: 10.3 %
NEUTROPHILS # BLD AUTO: 3.48 K/UL
NEUTROPHILS NFR BLD AUTO: 62.8 %
PLATELET # BLD AUTO: 322 K/UL
POTASSIUM SERPL-SCNC: 4.7 MMOL/L
PROT SERPL-MCNC: 5.9 G/DL
RBC # BLD: 4.53 M/UL
RBC # FLD: 17.2 %
SODIUM SERPL-SCNC: 143 MMOL/L
WBC # FLD AUTO: 5.54 K/UL

## 2021-10-07 NOTE — ADVANCED PRACTICE NURSE CONSULT - RECOMMEDATIONS
Monitor Glucose trends  Goal range 140 to 180mg/dl  consider  pre meal insulin  Lispro  3 units  sq AC  Home Care  resume metformin   Bexarotene Counseling:  I discussed with the patient the risks of bexarotene including but not limited to hair loss, dry lips/skin/eyes, liver abnormalities, hyperlipidemia, pancreatitis, depression/suicidal ideation, photosensitivity, drug rash/allergic reactions, hypothyroidism, anemia, leukopenia, infection, cataracts, and teratogenicity.  Patient understands that they will need regular blood tests to check lipid profile, liver function tests, white blood cell count, thyroid function tests and pregnancy test if applicable.

## 2021-10-11 ENCOUNTER — LABORATORY RESULT (OUTPATIENT)
Age: 61
End: 2021-10-11

## 2021-10-12 LAB
ANION GAP SERPL CALC-SCNC: 11 MMOL/L
BASOPHILS # BLD AUTO: 0.01 K/UL
BASOPHILS NFR BLD AUTO: 0.2 %
BUN SERPL-MCNC: 29 MG/DL
CALCIUM SERPL-MCNC: 9.6 MG/DL
CHLORIDE SERPL-SCNC: 96 MMOL/L
CO2 SERPL-SCNC: 32 MMOL/L
CREAT SERPL-MCNC: 1.39 MG/DL
EOSINOPHIL # BLD AUTO: 0 K/UL
EOSINOPHIL NFR BLD AUTO: 0 %
GLUCOSE SERPL-MCNC: 176 MG/DL
HCT VFR BLD CALC: 37.9 %
HGB BLD-MCNC: 10.3 G/DL
IMM GRANULOCYTES NFR BLD AUTO: 0.4 %
LYMPHOCYTES # BLD AUTO: 0.74 K/UL
LYMPHOCYTES NFR BLD AUTO: 15 %
MAN DIFF?: NORMAL
MCHC RBC-ENTMCNC: 22.4 PG
MCHC RBC-ENTMCNC: 27.2 GM/DL
MCV RBC AUTO: 82.4 FL
MONOCYTES # BLD AUTO: 0.43 K/UL
MONOCYTES NFR BLD AUTO: 8.7 %
NEUTROPHILS # BLD AUTO: 3.73 K/UL
NEUTROPHILS NFR BLD AUTO: 75.7 %
PLATELET # BLD AUTO: 275 K/UL
POTASSIUM SERPL-SCNC: 5.3 MMOL/L
RBC # BLD: 4.6 M/UL
RBC # FLD: 16.5 %
SODIUM SERPL-SCNC: 140 MMOL/L
WBC # FLD AUTO: 4.93 K/UL

## 2021-10-22 LAB
ALBUMIN SERPL ELPH-MCNC: 3.7 G/DL
ALP BLD-CCNC: 57 U/L
ALT SERPL-CCNC: 19 U/L
ANION GAP SERPL CALC-SCNC: 9 MMOL/L
AST SERPL-CCNC: 12 U/L
BASOPHILS # BLD AUTO: 0.02 K/UL
BASOPHILS NFR BLD AUTO: 0.3 %
BILIRUB SERPL-MCNC: <0.2 MG/DL
BUN SERPL-MCNC: 27 MG/DL
CALCIUM SERPL-MCNC: 9.5 MG/DL
CHLORIDE SERPL-SCNC: 96 MMOL/L
CO2 SERPL-SCNC: 35 MMOL/L
CREAT SERPL-MCNC: 1.28 MG/DL
EOSINOPHIL # BLD AUTO: 0.27 K/UL
EOSINOPHIL NFR BLD AUTO: 3.4 %
GLUCOSE SERPL-MCNC: 150 MG/DL
HCT VFR BLD CALC: 37.8 %
HGB BLD-MCNC: 9.9 G/DL
IMM GRANULOCYTES NFR BLD AUTO: 0.5 %
LYMPHOCYTES # BLD AUTO: 2.04 K/UL
LYMPHOCYTES NFR BLD AUTO: 25.7 %
MAN DIFF?: NORMAL
MCHC RBC-ENTMCNC: 21.7 PG
MCHC RBC-ENTMCNC: 26.2 GM/DL
MCV RBC AUTO: 82.7 FL
MONOCYTES # BLD AUTO: 0.65 K/UL
MONOCYTES NFR BLD AUTO: 8.2 %
NEUTROPHILS # BLD AUTO: 4.93 K/UL
NEUTROPHILS NFR BLD AUTO: 61.9 %
PLATELET # BLD AUTO: 240 K/UL
POTASSIUM SERPL-SCNC: 5.5 MMOL/L
PROT SERPL-MCNC: 5.5 G/DL
RBC # BLD: 4.57 M/UL
RBC # FLD: 16.8 %
SODIUM SERPL-SCNC: 140 MMOL/L
WBC # FLD AUTO: 7.95 K/UL

## 2021-10-29 LAB
ANION GAP SERPL CALC-SCNC: 12 MMOL/L
BASOPHILS # BLD AUTO: 0.02 K/UL
BASOPHILS NFR BLD AUTO: 0.3 %
BUN SERPL-MCNC: 24 MG/DL
CALCIUM SERPL-MCNC: 9.6 MG/DL
CHLORIDE SERPL-SCNC: 95 MMOL/L
CO2 SERPL-SCNC: 35 MMOL/L
CREAT SERPL-MCNC: 1.5 MG/DL
EOSINOPHIL # BLD AUTO: 0.19 K/UL
EOSINOPHIL NFR BLD AUTO: 3.3 %
GLUCOSE SERPL-MCNC: 132 MG/DL
HCT VFR BLD CALC: 36.1 %
HGB BLD-MCNC: 9.8 G/DL
IMM GRANULOCYTES NFR BLD AUTO: 0.3 %
LYMPHOCYTES # BLD AUTO: 1.15 K/UL
LYMPHOCYTES NFR BLD AUTO: 19.7 %
MAN DIFF?: NORMAL
MCHC RBC-ENTMCNC: 22.5 PG
MCHC RBC-ENTMCNC: 27.1 GM/DL
MCV RBC AUTO: 83 FL
MONOCYTES # BLD AUTO: 0.62 K/UL
MONOCYTES NFR BLD AUTO: 10.6 %
NEUTROPHILS # BLD AUTO: 3.83 K/UL
NEUTROPHILS NFR BLD AUTO: 65.8 %
PLATELET # BLD AUTO: 189 K/UL
POTASSIUM SERPL-SCNC: 4.6 MMOL/L
RBC # BLD: 4.35 M/UL
RBC # FLD: 17 %
SODIUM SERPL-SCNC: 141 MMOL/L
WBC # FLD AUTO: 5.83 K/UL

## 2021-11-01 ENCOUNTER — LABORATORY RESULT (OUTPATIENT)
Age: 61
End: 2021-11-01

## 2021-11-04 LAB
ALBUMIN SERPL ELPH-MCNC: 3.7 G/DL
ALP BLD-CCNC: 55 U/L
ALT SERPL-CCNC: 6 U/L
ANION GAP SERPL CALC-SCNC: 14 MMOL/L
AST SERPL-CCNC: 10 U/L
BASOPHILS # BLD AUTO: 0.03 K/UL
BASOPHILS NFR BLD AUTO: 0.7 %
BILIRUB SERPL-MCNC: <0.2 MG/DL
BUN SERPL-MCNC: 23 MG/DL
CALCIUM SERPL-MCNC: 9.6 MG/DL
CHLORIDE SERPL-SCNC: 96 MMOL/L
CO2 SERPL-SCNC: 32 MMOL/L
CREAT SERPL-MCNC: 1.53 MG/DL
EOSINOPHIL # BLD AUTO: 0.2 K/UL
EOSINOPHIL NFR BLD AUTO: 4.7 %
GLUCOSE SERPL-MCNC: 113 MG/DL
HCT VFR BLD CALC: 32.3 %
HGB BLD-MCNC: 8.6 G/DL
IMM GRANULOCYTES NFR BLD AUTO: 0.5 %
LYMPHOCYTES # BLD AUTO: 1.27 K/UL
LYMPHOCYTES NFR BLD AUTO: 29.7 %
MAN DIFF?: NORMAL
MCHC RBC-ENTMCNC: 22.5 PG
MCHC RBC-ENTMCNC: 26.6 GM/DL
MCV RBC AUTO: 84.6 FL
MONOCYTES # BLD AUTO: 0.44 K/UL
MONOCYTES NFR BLD AUTO: 10.3 %
NEUTROPHILS # BLD AUTO: 2.31 K/UL
NEUTROPHILS NFR BLD AUTO: 54.1 %
PLATELET # BLD AUTO: 183 K/UL
POTASSIUM SERPL-SCNC: 4.9 MMOL/L
PROT SERPL-MCNC: 5.5 G/DL
RBC # BLD: 3.82 M/UL
RBC # FLD: 17.1 %
SODIUM SERPL-SCNC: 143 MMOL/L
WBC # FLD AUTO: 4.27 K/UL

## 2021-11-06 ENCOUNTER — RX RENEWAL (OUTPATIENT)
Age: 61
End: 2021-11-06

## 2021-11-08 ENCOUNTER — LABORATORY RESULT (OUTPATIENT)
Age: 61
End: 2021-11-08

## 2021-11-09 ENCOUNTER — RX RENEWAL (OUTPATIENT)
Age: 61
End: 2021-11-09

## 2021-11-10 NOTE — PATIENT PROFILE ADULT - NSTRANSFEREYEGLASSESPAIRS_GEN_A_NUR
Relevant Problems   CARDIOVASCULAR   (+) Atrial fibrillation (HCC)   (+) Atrial fibrillation with RVR (HCC)       Anesthetic History   No history of anesthetic complications            Review of Systems / Medical History  Patient summary reviewed, nursing notes reviewed and pertinent labs reviewed    Pulmonary  Within defined limits                 Neuro/Psych   Within defined limits           Cardiovascular            Dysrhythmias : atrial fibrillation and atrial flutter  Pacemaker (BiV ICD placed in 2000. 18 months of battery life remaining. Shock delivered in August 2021 for the first time and converted to SR.)    Exercise tolerance: >4 METS  Comments: HOCM (Hypertrophic obstructive cardiomyopathy) is documented in her hx. Per TTE, there is currently no obstruction:    TTE, 11/4/21:  · LV: Estimated LVEF is 50 - 55%. Normal cavity size and systolic function (ejection fraction normal). Septal asymmetric hypertrophy. No asymmetric gradient noted. Severe septal wall hypertrophy. Mild posterior wall hypertrophy. Abnormal left ventricular septal motion consistent with post-operative state. Mild (grade 1) left ventricular diastolic dysfunction. The findings are consistent with hypertrophic cardiomyopathy without obstruction. · TV: Mild tricuspid valve regurgitation is present. Right Ventricular Arterial Pressure (RVSP) is 32 mmHg. · LA: Moderately dilated left atrium.     EKG 11/8/21:  Atrial flutter with variable A-V block   Right bundle branch block   Possible Inferior infarct (cited on or before 08-NOV-2021)   Cannot rule out Anterior infarct (cited on or before 08-NOV-2021)   Abnormal ECG        GI/Hepatic/Renal  Within defined limits              Endo/Other  Within defined limits           Other Findings            Physical Exam    Airway  Mallampati: I  TM Distance: 4 - 6 cm  Neck ROM: normal range of motion   Mouth opening: Normal     Cardiovascular    Rhythm: irregular  Rate: abnormal         Dental  No notable dental hx       Pulmonary  Breath sounds clear to auscultation               Abdominal  GI exam deferred       Other Findings            Anesthetic Plan    ASA: 4  Anesthesia type: total IV anesthesia and MAC          Induction: Intravenous  Anesthetic plan and risks discussed with: Patient 1 pair

## 2021-11-12 ENCOUNTER — RX RENEWAL (OUTPATIENT)
Age: 61
End: 2021-11-12

## 2021-11-15 ENCOUNTER — LABORATORY RESULT (OUTPATIENT)
Age: 61
End: 2021-11-15

## 2021-11-16 LAB
BASOPHILS # BLD AUTO: 0.01 K/UL
BASOPHILS NFR BLD AUTO: 0.2 %
EOSINOPHIL # BLD AUTO: 0 K/UL
EOSINOPHIL NFR BLD AUTO: 0 %
HCT VFR BLD CALC: 34.6 %
HGB BLD-MCNC: 9.8 G/DL
IMM GRANULOCYTES NFR BLD AUTO: 1 %
LYMPHOCYTES # BLD AUTO: 0.33 K/UL
LYMPHOCYTES NFR BLD AUTO: 8 %
MAN DIFF?: NORMAL
MCHC RBC-ENTMCNC: 23.5 PG
MCHC RBC-ENTMCNC: 28.3 GM/DL
MCV RBC AUTO: 83 FL
MONOCYTES # BLD AUTO: 0.1 K/UL
MONOCYTES NFR BLD AUTO: 2.4 %
NEUTROPHILS # BLD AUTO: 3.66 K/UL
NEUTROPHILS NFR BLD AUTO: 88.4 %
PLATELET # BLD AUTO: 207 K/UL
RBC # BLD: 4.17 M/UL
RBC # FLD: 17.3 %
WBC # FLD AUTO: 4.14 K/UL

## 2021-11-19 ENCOUNTER — APPOINTMENT (OUTPATIENT)
Dept: INTERNAL MEDICINE | Facility: CLINIC | Age: 61
End: 2021-11-19
Payer: COMMERCIAL

## 2021-11-19 LAB
25(OH)D3 SERPL-MCNC: 64.7 NG/ML
ALBUMIN SERPL ELPH-MCNC: 4.2 G/DL
ALP BLD-CCNC: 67 U/L
ALT SERPL-CCNC: 11 U/L
ANION GAP SERPL CALC-SCNC: 15 MMOL/L
AST SERPL-CCNC: 10 U/L
BILIRUB SERPL-MCNC: <0.2 MG/DL
BUN SERPL-MCNC: 23 MG/DL
CALCIUM SERPL-MCNC: 9.8 MG/DL
CHLORIDE SERPL-SCNC: 94 MMOL/L
CHOLEST SERPL-MCNC: 161 MG/DL
CO2 SERPL-SCNC: 32 MMOL/L
CREAT SERPL-MCNC: 1.53 MG/DL
ESTIMATED AVERAGE GLUCOSE: 154 MG/DL
FOLATE SERPL-MCNC: >20 NG/ML
GLUCOSE SERPL-MCNC: 273 MG/DL
HBA1C MFR BLD HPLC: 7 %
HDLC SERPL-MCNC: 88 MG/DL
IRON SERPL-MCNC: 26 UG/DL
LDLC SERPL CALC-MCNC: 63 MG/DL
NONHDLC SERPL-MCNC: 73 MG/DL
POTASSIUM SERPL-SCNC: 5.2 MMOL/L
PROT SERPL-MCNC: 6.1 G/DL
SODIUM SERPL-SCNC: 141 MMOL/L
TRIGL SERPL-MCNC: 51 MG/DL
TSH SERPL-ACNC: 0.7 UIU/ML
VIT B12 SERPL-MCNC: 799 PG/ML

## 2021-11-19 PROCEDURE — 99213 OFFICE O/P EST LOW 20 MIN: CPT | Mod: 95

## 2021-11-22 ENCOUNTER — LABORATORY RESULT (OUTPATIENT)
Age: 61
End: 2021-11-22

## 2021-11-24 LAB
BASOPHILS # BLD AUTO: 0.02 K/UL
BASOPHILS NFR BLD AUTO: 0.2 %
EOSINOPHIL # BLD AUTO: 0.18 K/UL
EOSINOPHIL NFR BLD AUTO: 1.8 %
HCT VFR BLD CALC: 34.2 %
HGB BLD-MCNC: 9.5 G/DL
IMM GRANULOCYTES NFR BLD AUTO: 0.8 %
LYMPHOCYTES # BLD AUTO: 1.98 K/UL
LYMPHOCYTES NFR BLD AUTO: 20.3 %
MAN DIFF?: NORMAL
MCHC RBC-ENTMCNC: 23.2 PG
MCHC RBC-ENTMCNC: 27.8 GM/DL
MCV RBC AUTO: 83.6 FL
MONOCYTES # BLD AUTO: 0.8 K/UL
MONOCYTES NFR BLD AUTO: 8.2 %
NEUTROPHILS # BLD AUTO: 6.67 K/UL
NEUTROPHILS NFR BLD AUTO: 68.7 %
PLATELET # BLD AUTO: 181 K/UL
RBC # BLD: 4.09 M/UL
RBC # FLD: 16.5 %
WBC # FLD AUTO: 9.73 K/UL

## 2021-11-29 ENCOUNTER — LABORATORY RESULT (OUTPATIENT)
Age: 61
End: 2021-11-29

## 2021-11-30 RX ORDER — NEEDLES, DISPOSABLE 25GX5/8"
25G X 5/8" NEEDLE, DISPOSABLE MISCELLANEOUS
Qty: 8 | Refills: 3 | Status: COMPLETED | COMMUNITY
Start: 2021-06-22 | End: 2021-11-30

## 2021-12-03 LAB
ALBUMIN SERPL ELPH-MCNC: 3.6 G/DL
ALP BLD-CCNC: 58 U/L
ALT SERPL-CCNC: 15 U/L
ANION GAP SERPL CALC-SCNC: 10 MMOL/L
AST SERPL-CCNC: 12 U/L
BASOPHILS # BLD AUTO: 0.02 K/UL
BASOPHILS NFR BLD AUTO: 0.4 %
BILIRUB SERPL-MCNC: 0.2 MG/DL
BUN SERPL-MCNC: 26 MG/DL
CALCIUM SERPL-MCNC: 9.6 MG/DL
CHLORIDE SERPL-SCNC: 96 MMOL/L
CO2 SERPL-SCNC: 34 MMOL/L
CREAT SERPL-MCNC: 1.39 MG/DL
EOSINOPHIL # BLD AUTO: 0.11 K/UL
EOSINOPHIL NFR BLD AUTO: 2.1 %
GLUCOSE SERPL-MCNC: 204 MG/DL
HCT VFR BLD CALC: 34.1 %
HGB BLD-MCNC: 9.1 G/DL
IMM GRANULOCYTES NFR BLD AUTO: 1.2 %
LYMPHOCYTES # BLD AUTO: 1.31 K/UL
LYMPHOCYTES NFR BLD AUTO: 25.1 %
MAN DIFF?: NORMAL
MCHC RBC-ENTMCNC: 22.8 PG
MCHC RBC-ENTMCNC: 26.7 GM/DL
MCV RBC AUTO: 85.3 FL
MONOCYTES # BLD AUTO: 0.49 K/UL
MONOCYTES NFR BLD AUTO: 9.4 %
NEUTROPHILS # BLD AUTO: 3.22 K/UL
NEUTROPHILS NFR BLD AUTO: 61.8 %
PLATELET # BLD AUTO: 116 K/UL
POTASSIUM SERPL-SCNC: 5 MMOL/L
PROT SERPL-MCNC: 5.3 G/DL
RBC # BLD: 4 M/UL
RBC # FLD: 17.2 %
SODIUM SERPL-SCNC: 140 MMOL/L
WBC # FLD AUTO: 5.21 K/UL

## 2021-12-10 LAB
ALBUMIN SERPL ELPH-MCNC: 3.4 G/DL
ALP BLD-CCNC: 59 U/L
ALT SERPL-CCNC: 10 U/L
ANION GAP SERPL CALC-SCNC: 11 MMOL/L
AST SERPL-CCNC: 10 U/L
BASOPHILS # BLD AUTO: 0.02 K/UL
BASOPHILS NFR BLD AUTO: 0.4 %
BILIRUB SERPL-MCNC: 0.2 MG/DL
BUN SERPL-MCNC: 18 MG/DL
CALCIUM SERPL-MCNC: 9.8 MG/DL
CHLORIDE SERPL-SCNC: 98 MMOL/L
CO2 SERPL-SCNC: 33 MMOL/L
CREAT SERPL-MCNC: 1.44 MG/DL
EOSINOPHIL # BLD AUTO: 0.16 K/UL
EOSINOPHIL NFR BLD AUTO: 3.6 %
GLUCOSE SERPL-MCNC: 239 MG/DL
HCT VFR BLD CALC: 35.1 %
HGB BLD-MCNC: 9.5 G/DL
IMM GRANULOCYTES NFR BLD AUTO: 1.1 %
LYMPHOCYTES # BLD AUTO: 1.11 K/UL
LYMPHOCYTES NFR BLD AUTO: 24.8 %
MAN DIFF?: NORMAL
MCHC RBC-ENTMCNC: 24 PG
MCHC RBC-ENTMCNC: 27.1 GM/DL
MCV RBC AUTO: 88.6 FL
MONOCYTES # BLD AUTO: 0.5 K/UL
MONOCYTES NFR BLD AUTO: 11.2 %
NEUTROPHILS # BLD AUTO: 2.64 K/UL
NEUTROPHILS NFR BLD AUTO: 58.9 %
PLATELET # BLD AUTO: 255 K/UL
POTASSIUM SERPL-SCNC: 4.6 MMOL/L
PROT SERPL-MCNC: 5.2 G/DL
RBC # BLD: 3.96 M/UL
RBC # FLD: 19 %
SODIUM SERPL-SCNC: 141 MMOL/L
WBC # FLD AUTO: 4.48 K/UL

## 2021-12-13 ENCOUNTER — LABORATORY RESULT (OUTPATIENT)
Age: 61
End: 2021-12-13

## 2021-12-16 ENCOUNTER — RX RENEWAL (OUTPATIENT)
Age: 61
End: 2021-12-16

## 2021-12-17 LAB
ALBUMIN SERPL ELPH-MCNC: 3.9 G/DL
ALP BLD-CCNC: 74 U/L
ALT SERPL-CCNC: 10 U/L
ANION GAP SERPL CALC-SCNC: 13 MMOL/L
AST SERPL-CCNC: 12 U/L
BASOPHILS # BLD AUTO: 0.04 K/UL
BASOPHILS NFR BLD AUTO: 0.7 %
BILIRUB SERPL-MCNC: 0.2 MG/DL
BUN SERPL-MCNC: 12 MG/DL
CALCIUM SERPL-MCNC: 9.7 MG/DL
CHLORIDE SERPL-SCNC: 95 MMOL/L
CO2 SERPL-SCNC: 35 MMOL/L
CREAT SERPL-MCNC: 1.46 MG/DL
EOSINOPHIL # BLD AUTO: 0.15 K/UL
EOSINOPHIL NFR BLD AUTO: 2.6 %
GLUCOSE SERPL-MCNC: 94 MG/DL
HCT VFR BLD CALC: 42.7 %
HEMOCCULT STL QL IA: NEGATIVE
HGB BLD-MCNC: 11.9 G/DL
IMM GRANULOCYTES NFR BLD AUTO: 0.7 %
LYMPHOCYTES # BLD AUTO: 1.5 K/UL
LYMPHOCYTES NFR BLD AUTO: 25.5 %
MAN DIFF?: NORMAL
MCHC RBC-ENTMCNC: 24.7 PG
MCHC RBC-ENTMCNC: 27.9 GM/DL
MCV RBC AUTO: 88.6 FL
MONOCYTES # BLD AUTO: 0.63 K/UL
MONOCYTES NFR BLD AUTO: 10.7 %
NEUTROPHILS # BLD AUTO: 3.52 K/UL
NEUTROPHILS NFR BLD AUTO: 59.8 %
PLATELET # BLD AUTO: 313 K/UL
POTASSIUM SERPL-SCNC: 4.8 MMOL/L
PROT SERPL-MCNC: 5.9 G/DL
RBC # BLD: 4.82 M/UL
RBC # FLD: 20.4 %
SODIUM SERPL-SCNC: 144 MMOL/L
WBC # FLD AUTO: 5.88 K/UL

## 2021-12-23 LAB
ALBUMIN SERPL ELPH-MCNC: 4.3 G/DL
ALP BLD-CCNC: 70 U/L
ALT SERPL-CCNC: 8 U/L
ANION GAP SERPL CALC-SCNC: 15 MMOL/L
AST SERPL-CCNC: 12 U/L
BASOPHILS # BLD AUTO: 0.01 K/UL
BASOPHILS NFR BLD AUTO: 0.1 %
BILIRUB SERPL-MCNC: 0.3 MG/DL
BUN SERPL-MCNC: 25 MG/DL
CALCIUM SERPL-MCNC: 9.8 MG/DL
CHLORIDE SERPL-SCNC: 91 MMOL/L
CO2 SERPL-SCNC: 32 MMOL/L
CREAT SERPL-MCNC: 1.28 MG/DL
EOSINOPHIL # BLD AUTO: 0 K/UL
EOSINOPHIL NFR BLD AUTO: 0 %
GLUCOSE SERPL-MCNC: 291 MG/DL
HCT VFR BLD CALC: 44.6 %
HGB BLD-MCNC: 12.9 G/DL
IMM GRANULOCYTES NFR BLD AUTO: 0.3 %
LYMPHOCYTES # BLD AUTO: 0.78 K/UL
LYMPHOCYTES NFR BLD AUTO: 8 %
MAN DIFF?: NORMAL
MCHC RBC-ENTMCNC: 25.1 PG
MCHC RBC-ENTMCNC: 28.9 GM/DL
MCV RBC AUTO: 86.9 FL
MONOCYTES # BLD AUTO: 0.94 K/UL
MONOCYTES NFR BLD AUTO: 9.6 %
NEUTROPHILS # BLD AUTO: 8 K/UL
NEUTROPHILS NFR BLD AUTO: 82 %
PLATELET # BLD AUTO: 209 K/UL
POTASSIUM SERPL-SCNC: 5.2 MMOL/L
PROT SERPL-MCNC: 6.4 G/DL
RBC # BLD: 5.13 M/UL
RBC # FLD: 18.8 %
SODIUM SERPL-SCNC: 138 MMOL/L
WBC # FLD AUTO: 9.76 K/UL

## 2021-12-27 ENCOUNTER — LABORATORY RESULT (OUTPATIENT)
Age: 61
End: 2021-12-27

## 2021-12-28 ENCOUNTER — NON-APPOINTMENT (OUTPATIENT)
Age: 61
End: 2021-12-28

## 2021-12-31 LAB
ALBUMIN SERPL ELPH-MCNC: 3.8 G/DL
ALP BLD-CCNC: 57 U/L
ALT SERPL-CCNC: 15 U/L
ANION GAP SERPL CALC-SCNC: 10 MMOL/L
ANION GAP SERPL CALC-SCNC: 7 MMOL/L
AST SERPL-CCNC: 10 U/L
BASOPHILS # BLD AUTO: 0.01 K/UL
BASOPHILS NFR BLD AUTO: 0.1 %
BILIRUB SERPL-MCNC: 0.3 MG/DL
BUN SERPL-MCNC: 38 MG/DL
BUN SERPL-MCNC: 40 MG/DL
CALCIUM SERPL-MCNC: 9.3 MG/DL
CALCIUM SERPL-MCNC: 9.5 MG/DL
CHLORIDE SERPL-SCNC: 92 MMOL/L
CHLORIDE SERPL-SCNC: 94 MMOL/L
CO2 SERPL-SCNC: 34 MMOL/L
CO2 SERPL-SCNC: 39 MMOL/L
CREAT SERPL-MCNC: 1.33 MG/DL
CREAT SERPL-MCNC: 1.38 MG/DL
EOSINOPHIL # BLD AUTO: 0 K/UL
EOSINOPHIL NFR BLD AUTO: 0 %
GLUCOSE SERPL-MCNC: 164 MG/DL
GLUCOSE SERPL-MCNC: 412 MG/DL
HCT VFR BLD CALC: 42 %
HGB BLD-MCNC: 12.1 G/DL
IMM GRANULOCYTES NFR BLD AUTO: 0.7 %
LYMPHOCYTES # BLD AUTO: 0.48 K/UL
LYMPHOCYTES NFR BLD AUTO: 6.3 %
MAN DIFF?: NORMAL
MCHC RBC-ENTMCNC: 25.4 PG
MCHC RBC-ENTMCNC: 28.8 GM/DL
MCV RBC AUTO: 88.2 FL
MONOCYTES # BLD AUTO: 0.21 K/UL
MONOCYTES NFR BLD AUTO: 2.7 %
NEUTROPHILS # BLD AUTO: 6.93 K/UL
NEUTROPHILS NFR BLD AUTO: 90.2 %
PLATELET # BLD AUTO: 196 K/UL
POTASSIUM SERPL-SCNC: 5.4 MMOL/L
POTASSIUM SERPL-SCNC: 6.1 MMOL/L
PROT SERPL-MCNC: 5.5 G/DL
RBC # BLD: 4.76 M/UL
RBC # FLD: 16.6 %
SODIUM SERPL-SCNC: 136 MMOL/L
SODIUM SERPL-SCNC: 140 MMOL/L
WBC # FLD AUTO: 7.68 K/UL

## 2022-01-01 ENCOUNTER — NON-APPOINTMENT (OUTPATIENT)
Age: 62
End: 2022-01-01

## 2022-01-01 ENCOUNTER — RX RENEWAL (OUTPATIENT)
Age: 62
End: 2022-01-01

## 2022-01-01 RX ORDER — AMOXICILLIN AND CLAVULANATE POTASSIUM 875; 125 MG/1; MG/1
875-125 TABLET, COATED ORAL TWICE DAILY
Qty: 20 | Refills: 0 | Status: COMPLETED | COMMUNITY
Start: 2022-01-01 | End: 2022-01-01

## 2022-01-07 LAB
ANION GAP SERPL CALC-SCNC: 10 MMOL/L
BASOPHILS # BLD AUTO: 0.04 K/UL
BASOPHILS NFR BLD AUTO: 0.6 %
BUN SERPL-MCNC: 21 MG/DL
CALCIUM SERPL-MCNC: 7.6 MG/DL
CHLORIDE SERPL-SCNC: 95 MMOL/L
CO2 SERPL-SCNC: 37 MMOL/L
CREAT SERPL-MCNC: 1.22 MG/DL
EOSINOPHIL # BLD AUTO: 0.24 K/UL
EOSINOPHIL NFR BLD AUTO: 3.3 %
GLUCOSE SERPL-MCNC: 143 MG/DL
HCT VFR BLD CALC: 43.4 %
HGB BLD-MCNC: 12.4 G/DL
IMM GRANULOCYTES NFR BLD AUTO: 1.5 %
LYMPHOCYTES # BLD AUTO: 1.5 K/UL
LYMPHOCYTES NFR BLD AUTO: 20.9 %
MAN DIFF?: NORMAL
MCHC RBC-ENTMCNC: 26.1 PG
MCHC RBC-ENTMCNC: 28.6 GM/DL
MCV RBC AUTO: 91.2 FL
MONOCYTES # BLD AUTO: 0.55 K/UL
MONOCYTES NFR BLD AUTO: 7.7 %
NEUTROPHILS # BLD AUTO: 4.73 K/UL
NEUTROPHILS NFR BLD AUTO: 66 %
PLATELET # BLD AUTO: 206 K/UL
POTASSIUM SERPL-SCNC: 4.8 MMOL/L
RBC # BLD: 4.76 M/UL
RBC # FLD: 15.7 %
SODIUM SERPL-SCNC: 143 MMOL/L
WBC # FLD AUTO: 7.17 K/UL

## 2022-01-14 LAB
ALBUMIN SERPL ELPH-MCNC: 3.8 G/DL
ALP BLD-CCNC: 70 U/L
ALT SERPL-CCNC: 18 U/L
ANION GAP SERPL CALC-SCNC: 11 MMOL/L
AST SERPL-CCNC: 16 U/L
BASOPHILS # BLD AUTO: 0.03 K/UL
BASOPHILS NFR BLD AUTO: 0.4 %
BILIRUB SERPL-MCNC: 0.2 MG/DL
BUN SERPL-MCNC: 18 MG/DL
CALCIUM SERPL-MCNC: 9.5 MG/DL
CHLORIDE SERPL-SCNC: 96 MMOL/L
CO2 SERPL-SCNC: 36 MMOL/L
CREAT SERPL-MCNC: 1.31 MG/DL
EOSINOPHIL # BLD AUTO: 0.22 K/UL
EOSINOPHIL NFR BLD AUTO: 3.2 %
GLUCOSE SERPL-MCNC: 151 MG/DL
HCT VFR BLD CALC: 43.1 %
HGB BLD-MCNC: 12.5 G/DL
IMM GRANULOCYTES NFR BLD AUTO: 0.7 %
LYMPHOCYTES # BLD AUTO: 1.66 K/UL
LYMPHOCYTES NFR BLD AUTO: 24 %
MAN DIFF?: NORMAL
MCHC RBC-ENTMCNC: 26.2 PG
MCHC RBC-ENTMCNC: 29 GM/DL
MCV RBC AUTO: 90.4 FL
MONOCYTES # BLD AUTO: 0.64 K/UL
MONOCYTES NFR BLD AUTO: 9.2 %
NEUTROPHILS # BLD AUTO: 4.32 K/UL
NEUTROPHILS NFR BLD AUTO: 62.5 %
PLATELET # BLD AUTO: 184 K/UL
POTASSIUM SERPL-SCNC: 5.3 MMOL/L
PROT SERPL-MCNC: 5.6 G/DL
RBC # BLD: 4.77 M/UL
RBC # FLD: 15.1 %
SODIUM SERPL-SCNC: 143 MMOL/L
WBC # FLD AUTO: 6.92 K/UL

## 2022-01-20 ENCOUNTER — APPOINTMENT (OUTPATIENT)
Dept: INTERNAL MEDICINE | Facility: CLINIC | Age: 62
End: 2022-01-20
Payer: COMMERCIAL

## 2022-01-20 DIAGNOSIS — I83.019 VARICOSE VEINS OF RIGHT LOWER EXTREMITY WITH ULCER OF UNSPECIFIED SITE: ICD-10-CM

## 2022-01-20 DIAGNOSIS — L97.919 VARICOSE VEINS OF RIGHT LOWER EXTREMITY WITH ULCER OF UNSPECIFIED SITE: ICD-10-CM

## 2022-01-20 DIAGNOSIS — I48.0 PAROXYSMAL ATRIAL FIBRILLATION: ICD-10-CM

## 2022-01-20 DIAGNOSIS — I10 ESSENTIAL (PRIMARY) HYPERTENSION: ICD-10-CM

## 2022-01-20 DIAGNOSIS — E78.5 HYPERLIPIDEMIA, UNSPECIFIED: ICD-10-CM

## 2022-01-20 LAB
ANION GAP SERPL CALC-SCNC: 11 MMOL/L
BASOPHILS # BLD AUTO: 0.02 K/UL
BASOPHILS NFR BLD AUTO: 0.5 %
BUN SERPL-MCNC: 18 MG/DL
CALCIUM SERPL-MCNC: 9.4 MG/DL
CHLORIDE SERPL-SCNC: 95 MMOL/L
CO2 SERPL-SCNC: 36 MMOL/L
CREAT SERPL-MCNC: 1.38 MG/DL
EOSINOPHIL # BLD AUTO: 0.19 K/UL
EOSINOPHIL NFR BLD AUTO: 4.6 %
GLUCOSE SERPL-MCNC: 140 MG/DL
HCT VFR BLD CALC: 35.3 %
HGB BLD-MCNC: 10.6 G/DL
IMM GRANULOCYTES NFR BLD AUTO: 0.7 %
LYMPHOCYTES # BLD AUTO: 1.03 K/UL
LYMPHOCYTES NFR BLD AUTO: 25.1 %
MAN DIFF?: NORMAL
MCHC RBC-ENTMCNC: 26.3 PG
MCHC RBC-ENTMCNC: 30 GM/DL
MCV RBC AUTO: 87.6 FL
MONOCYTES # BLD AUTO: 0.67 K/UL
MONOCYTES NFR BLD AUTO: 16.3 %
NEUTROPHILS # BLD AUTO: 2.16 K/UL
NEUTROPHILS NFR BLD AUTO: 52.8 %
PLATELET # BLD AUTO: 130 K/UL
POTASSIUM SERPL-SCNC: 4.9 MMOL/L
RBC # BLD: 4.03 M/UL
RBC # FLD: 14.9 %
SODIUM SERPL-SCNC: 141 MMOL/L
WBC # FLD AUTO: 4.1 K/UL

## 2022-01-20 PROCEDURE — 99214 OFFICE O/P EST MOD 30 MIN: CPT | Mod: 95

## 2022-01-20 NOTE — HISTORY OF PRESENT ILLNESS
[Family Member] : family member [FreeTextEntry8] : I did a telemedicine visit with the patient and her brother at 9:30 AM today.\par I reviewed in detail with them her lab results from January 17, 2022.\par Her BMP revealed a stable creatinine and a glucose of 140.  Her potassium was 4.9.\par Her CBC revealed a hemoglobin of 10.6.  She will not take her Retacrit injection this week.\par She had a decreased platelet count which will need follow-up.\par Repeat labs will be ordered for January 24, 2022 via a home draw.\par Patient was advised that her FIT testing done in December was negative.\par She has not yet received her flu shot.\par She did receive COVID vaccination.  About 5 to 7 days after receiving the vaccine she developed swelling, itching, and redness of her left arm especially around the area of injection.  The area is not warm.  There has been no discharge.  There has been no fevers or chills.\par She also had an area of ecchymosis and hematoma at the right elbow area which is slowly improving.\par She reports that she is moving her bowels better on a regimen of Colace and Dulcolax.\par She denies any edema of the lower extremities.\par She denies any new chest pain or increased cough or hemoptysis.  Her breathing is essentially the same.\par She offers no other complaints today.

## 2022-01-20 NOTE — PHYSICAL EXAM
[No Acute Distress] : no acute distress [Well Nourished] : well nourished [Well Developed] : well developed [Well-Appearing] : well-appearing [Normal Voice/Communication] : normal voice/communication [Normal Sclera/Conjunctiva] : normal sclera/conjunctiva [Normal Outer Ear/Nose] : the outer ears and nose were normal in appearance [No Respiratory Distress] : no respiratory distress  [No Accessory Muscle Use] : no accessory muscle use [No Edema] : there was no peripheral edema [No Extremity Clubbing/Cyanosis] : no extremity clubbing/cyanosis [Speech Grossly Normal] : speech grossly normal [Normal Affect] : the affect was normal [Alert and Oriented x3] : oriented to person, place, and time [de-identified] : No audible stridor noted [de-identified] : RR = 16; wearing nasal cannula oxygen; no audible wheezing noted [de-identified] : Has mild swelling of left upper extremity; has area of mild erythema around left upper extremity injection site

## 2022-01-20 NOTE — REVIEW OF SYSTEMS
[Vision Problems] : vision problems [Postnasal Drip] : postnasal drip [Leg Claudication] : leg claudication [Shortness Of Breath] : shortness of breath [Dyspnea on Exertion] : dyspnea on exertion [Heartburn] : heartburn [Joint Pain] : joint pain [Muscle Weakness] : muscle weakness [Muscle Pain] : muscle pain [Back Pain] : back pain [Itching] : Itching [Hair Changes] : hair changes [Skin Rash] : skin rash [Unsteady Walking] : ataxia [Anxiety] : anxiety [Easy Bruising] : easy bruising [Negative] : Heme/Lymph

## 2022-01-20 NOTE — ASSESSMENT
[FreeTextEntry1] : The patient has end-stage emphysema\par Lung transplantation has been declined by [facility] and [facility]\par She will continue BiPAP therapy 24 hours/day\par She will continue oxygen 24 hours/day\par She will continue Symbicort, Spiriva, Singulair\par Off Prednisone\par She will continue nebulizer therapy every 4-6 hours\par She will use an Acapella device to assist with secretion clearance as needed\par If this is ineffective then we will order a smart vest to assist with secretion clearance\par Mucinex bid or\par She will add Mucomyst 10% solution 2 cc to her nebulizer therapy twice daily as needed\par Back on Zithromax tiw\par She declines further chest CT/PFTs\par She had  Covid vaccine and pneumococcal vaccine\par Will need to arrange for 2021 flu vaccine for patient\par She no longer smokes\par PT as tolerated\par Cardiology follow-up\par \par DM\par FSG's tid prior to meals\par Weight control\par ADA diet\par Continue present medications = now switched to Januvia\par Ophtho and podiatry declined\par Endocrine follow-up declined\par \par HTN\par She reports good BP control \par No recurrent PAF or SVT\par Continue diltiazem, Eliquis, Brilinta\par Reassured that bruising and hematoma likely related to Eliquis use\par Close f/u of CR/BUN = with no increased edema\par Continue Lasix 40 mg qod for now\par Cardiology follow-up\par \par Hyperlipidemia\par Low fat diet\par Weight control\par On daily Crestor\par Monitor lipid profile\par \par Anemia\par HGB 10.6\par FIT testing was negative\par Back on daily iron = she was reassured that iron can cause black stools\par DIDI support as per Dr. Townsend = hold for this week\par Hematology f/u\par Transfuse as needed\par We will closely monitor CBC, iron level\par \par Suspect COVID arm after recent COVID vaccination\par Elevate arm\par Apply ice\par Apply hydrocortisone 1% cream as needed\par Trial of Benadryl as needed\par If worsening swelling, redness, heat, or fever to call immediately for antibiotic therapy\par Will call me in follow-up on Monday\par Hold flu vaccination for now until arm improves\par \par She will do a TEB visit in 4 weeks and as needed\par She will call if her status worsens or does not improve\par She will call and follow-up on Monday\par She will call for any medical or pulmonary issues\par All of her questions were answered\par All of the above was d/w her and her brother\par She and her brother verbally confirmed understanding of all of the above and agreement with the above plan\par We will arrange a home blood draw for next week

## 2022-01-20 NOTE — HEALTH RISK ASSESSMENT
[Former] : Former [No] : In the past 12 months have you used drugs other than those required for medical reasons? No [No falls in past year] : Patient reported no falls in the past year [Patient refused screening] : Patient refused screening [de-identified] : The patient is bedbound [de-identified] : Regular

## 2022-01-24 ENCOUNTER — LABORATORY RESULT (OUTPATIENT)
Age: 62
End: 2022-01-24

## 2022-02-02 ENCOUNTER — RX RENEWAL (OUTPATIENT)
Age: 62
End: 2022-02-02

## 2022-02-04 LAB
ALBUMIN SERPL ELPH-MCNC: 4.1 G/DL
ALP BLD-CCNC: 70 U/L
ALT SERPL-CCNC: 12 U/L
ANION GAP SERPL CALC-SCNC: 10 MMOL/L
AST SERPL-CCNC: 12 U/L
BASOPHILS # BLD AUTO: 0.02 K/UL
BASOPHILS NFR BLD AUTO: 0.3 %
BILIRUB SERPL-MCNC: 0.3 MG/DL
BUN SERPL-MCNC: 26 MG/DL
CALCIUM SERPL-MCNC: 9.7 MG/DL
CHLORIDE SERPL-SCNC: 91 MMOL/L
CO2 SERPL-SCNC: 38 MMOL/L
CREAT SERPL-MCNC: 1.46 MG/DL
EOSINOPHIL # BLD AUTO: 0.06 K/UL
EOSINOPHIL NFR BLD AUTO: 0.8 %
GLUCOSE SERPL-MCNC: 191 MG/DL
HCT VFR BLD CALC: 38.7 %
HGB BLD-MCNC: 12 G/DL
IMM GRANULOCYTES NFR BLD AUTO: 0.9 %
LYMPHOCYTES # BLD AUTO: 1.36 K/UL
LYMPHOCYTES NFR BLD AUTO: 17.6 %
MAN DIFF?: NORMAL
MCHC RBC-ENTMCNC: 27.6 PG
MCHC RBC-ENTMCNC: 31 GM/DL
MCV RBC AUTO: 89.2 FL
MONOCYTES # BLD AUTO: 0.71 K/UL
MONOCYTES NFR BLD AUTO: 9.2 %
NEUTROPHILS # BLD AUTO: 5.5 K/UL
NEUTROPHILS NFR BLD AUTO: 71.2 %
PLATELET # BLD AUTO: 223 K/UL
POTASSIUM SERPL-SCNC: 5.4 MMOL/L
PROT SERPL-MCNC: 6 G/DL
RBC # BLD: 4.34 M/UL
RBC # FLD: 15.2 %
SODIUM SERPL-SCNC: 138 MMOL/L
WBC # FLD AUTO: 7.72 K/UL

## 2022-02-07 ENCOUNTER — RX RENEWAL (OUTPATIENT)
Age: 62
End: 2022-02-07

## 2022-02-09 ENCOUNTER — RX RENEWAL (OUTPATIENT)
Age: 62
End: 2022-02-09

## 2022-02-10 ENCOUNTER — RX RENEWAL (OUTPATIENT)
Age: 62
End: 2022-02-10

## 2022-02-11 LAB
ANION GAP SERPL CALC-SCNC: 13 MMOL/L
BASOPHILS # BLD AUTO: 0.02 K/UL
BASOPHILS NFR BLD AUTO: 0.3 %
BUN SERPL-MCNC: 21 MG/DL
CALCIUM SERPL-MCNC: 9.3 MG/DL
CHLORIDE SERPL-SCNC: 94 MMOL/L
CO2 SERPL-SCNC: 32 MMOL/L
CREAT SERPL-MCNC: 1.51 MG/DL
EOSINOPHIL # BLD AUTO: 0.18 K/UL
EOSINOPHIL NFR BLD AUTO: 2.6 %
GLUCOSE SERPL-MCNC: 141 MG/DL
HCT VFR BLD CALC: 38.6 %
HGB BLD-MCNC: 11.7 G/DL
IMM GRANULOCYTES NFR BLD AUTO: 0.7 %
LYMPHOCYTES # BLD AUTO: 1.42 K/UL
LYMPHOCYTES NFR BLD AUTO: 20.6 %
MAN DIFF?: NORMAL
MCHC RBC-ENTMCNC: 27.6 PG
MCHC RBC-ENTMCNC: 30.3 GM/DL
MCV RBC AUTO: 91 FL
MONOCYTES # BLD AUTO: 0.63 K/UL
MONOCYTES NFR BLD AUTO: 9.1 %
NEUTROPHILS # BLD AUTO: 4.6 K/UL
NEUTROPHILS NFR BLD AUTO: 66.7 %
PLATELET # BLD AUTO: 191 K/UL
POTASSIUM SERPL-SCNC: 4.8 MMOL/L
RBC # BLD: 4.24 M/UL
RBC # FLD: 16.7 %
SODIUM SERPL-SCNC: 139 MMOL/L
WBC # FLD AUTO: 6.9 K/UL

## 2022-02-14 ENCOUNTER — LABORATORY RESULT (OUTPATIENT)
Age: 62
End: 2022-02-14

## 2022-02-15 ENCOUNTER — RX RENEWAL (OUTPATIENT)
Age: 62
End: 2022-02-15

## 2022-02-18 ENCOUNTER — NON-APPOINTMENT (OUTPATIENT)
Age: 62
End: 2022-02-18

## 2022-02-21 ENCOUNTER — RX RENEWAL (OUTPATIENT)
Age: 62
End: 2022-02-21

## 2022-02-23 ENCOUNTER — RX RENEWAL (OUTPATIENT)
Age: 62
End: 2022-02-23

## 2022-02-25 LAB
25(OH)D3 SERPL-MCNC: 74.1 NG/ML
ALBUMIN SERPL ELPH-MCNC: 4.1 G/DL
ALP BLD-CCNC: 65 U/L
ALT SERPL-CCNC: 14 U/L
ANION GAP SERPL CALC-SCNC: 11 MMOL/L
AST SERPL-CCNC: 15 U/L
BASOPHILS # BLD AUTO: 0.03 K/UL
BASOPHILS NFR BLD AUTO: 0.5 %
BILIRUB SERPL-MCNC: 0.2 MG/DL
BUN SERPL-MCNC: 17 MG/DL
CALCIUM SERPL-MCNC: 9.4 MG/DL
CHLORIDE SERPL-SCNC: 98 MMOL/L
CHOLEST SERPL-MCNC: 133 MG/DL
CO2 SERPL-SCNC: 35 MMOL/L
COVID-19 NUCLEOCAPSID  GAM ANTIBODY INTERPRETATION: POSITIVE
COVID-19 SPIKE DOMAIN ANTIBODY INTERPRETATION: POSITIVE
CREAT SERPL-MCNC: 1.43 MG/DL
EOSINOPHIL # BLD AUTO: 0.18 K/UL
EOSINOPHIL NFR BLD AUTO: 3 %
ESTIMATED AVERAGE GLUCOSE: 166 MG/DL
FOLATE SERPL-MCNC: >20 NG/ML
GLUCOSE SERPL-MCNC: 79 MG/DL
HBA1C MFR BLD HPLC: 7.4 %
HCT VFR BLD CALC: 38.6 %
HDLC SERPL-MCNC: 67 MG/DL
HGB BLD-MCNC: 11.8 G/DL
IMM GRANULOCYTES NFR BLD AUTO: 0.7 %
IRON SERPL-MCNC: 62 UG/DL
LDLC SERPL CALC-MCNC: 44 MG/DL
LYMPHOCYTES # BLD AUTO: 1.58 K/UL
LYMPHOCYTES NFR BLD AUTO: 26.7 %
MAN DIFF?: NORMAL
MCHC RBC-ENTMCNC: 27.6 PG
MCHC RBC-ENTMCNC: 30.6 GM/DL
MCV RBC AUTO: 90.4 FL
MONOCYTES # BLD AUTO: 0.57 K/UL
MONOCYTES NFR BLD AUTO: 9.6 %
NEUTROPHILS # BLD AUTO: 3.52 K/UL
NEUTROPHILS NFR BLD AUTO: 59.5 %
NONHDLC SERPL-MCNC: 66 MG/DL
PLATELET # BLD AUTO: 211 K/UL
POTASSIUM SERPL-SCNC: 4.6 MMOL/L
PROT SERPL-MCNC: 5.8 G/DL
RBC # BLD: 4.27 M/UL
RBC # FLD: 14.5 %
SARS-COV-2 AB SERPL IA-ACNC: >250 U/ML
SARS-COV-2 AB SERPL QL IA: 39.2 INDEX
SODIUM SERPL-SCNC: 145 MMOL/L
TRIGL SERPL-MCNC: 74 MG/DL
TSH SERPL-ACNC: 1.66 UIU/ML
VIT B12 SERPL-MCNC: 786 PG/ML
WBC # FLD AUTO: 5.92 K/UL

## 2022-03-02 ENCOUNTER — APPOINTMENT (OUTPATIENT)
Dept: INTERNAL MEDICINE | Facility: CLINIC | Age: 62
End: 2022-03-02
Payer: COMMERCIAL

## 2022-03-02 DIAGNOSIS — R53.82 CHRONIC FATIGUE, UNSPECIFIED: ICD-10-CM

## 2022-03-02 DIAGNOSIS — R23.8 OTHER SKIN CHANGES: ICD-10-CM

## 2022-03-02 DIAGNOSIS — B35.1 TINEA UNGUIUM: ICD-10-CM

## 2022-03-02 DIAGNOSIS — L65.9 NONSCARRING HAIR LOSS, UNSPECIFIED: ICD-10-CM

## 2022-03-02 DIAGNOSIS — K21.9 GASTRO-ESOPHAGEAL REFLUX DISEASE W/OUT ESOPHAGITIS: ICD-10-CM

## 2022-03-02 LAB
BASOPHILS # BLD AUTO: 0.02 K/UL
BASOPHILS NFR BLD AUTO: 0.3 %
EOSINOPHIL # BLD AUTO: 0.09 K/UL
EOSINOPHIL NFR BLD AUTO: 1.5 %
HCT VFR BLD CALC: 33 %
HGB BLD-MCNC: 10.7 G/DL
IMM GRANULOCYTES NFR BLD AUTO: 0.3 %
LYMPHOCYTES # BLD AUTO: 0.94 K/UL
LYMPHOCYTES NFR BLD AUTO: 15.4 %
MAN DIFF?: NORMAL
MCHC RBC-ENTMCNC: 28.4 PG
MCHC RBC-ENTMCNC: 32.4 GM/DL
MCV RBC AUTO: 87.5 FL
MONOCYTES # BLD AUTO: 0.65 K/UL
MONOCYTES NFR BLD AUTO: 10.7 %
NEUTROPHILS # BLD AUTO: 4.38 K/UL
NEUTROPHILS NFR BLD AUTO: 71.8 %
PLATELET # BLD AUTO: 197 K/UL
RBC # BLD: 3.77 M/UL
RBC # FLD: 13.7 %
WBC # FLD AUTO: 6.1 K/UL

## 2022-03-02 PROCEDURE — 99214 OFFICE O/P EST MOD 30 MIN: CPT | Mod: 95

## 2022-03-02 RX ORDER — CICLOPIROX 80 MG/ML
8 SOLUTION TOPICAL TWICE DAILY
Qty: 1 | Refills: 0 | Status: ACTIVE | COMMUNITY
Start: 2022-03-02 | End: 1900-01-01

## 2022-03-02 NOTE — REVIEW OF SYSTEMS
[Fatigue] : fatigue [Vision Problems] : vision problems [Postnasal Drip] : postnasal drip [Leg Claudication] : leg claudication [Dyspnea on Exertion] : dyspnea on exertion [Shortness Of Breath] : shortness of breath [Heartburn] : heartburn [Joint Pain] : joint pain [Muscle Weakness] : muscle weakness [Muscle Pain] : muscle pain [Back Pain] : back pain [Itching] : Itching [Nail Changes] : nail changes [Hair Changes] : hair changes [Skin Rash] : skin rash [Unsteady Walking] : ataxia [Anxiety] : anxiety [Easy Bruising] : easy bruising [Negative] : Heme/Lymph

## 2022-03-02 NOTE — PHYSICAL EXAM
[No Acute Distress] : no acute distress [Well Nourished] : well nourished [Well Developed] : well developed [Normal Voice/Communication] : normal voice/communication [Well-Appearing] : well-appearing [Normal Sclera/Conjunctiva] : normal sclera/conjunctiva [Normal Outer Ear/Nose] : the outer ears and nose were normal in appearance [No Respiratory Distress] : no respiratory distress  [No Accessory Muscle Use] : no accessory muscle use [Speech Grossly Normal] : speech grossly normal [Normal Affect] : the affect was normal [Alert and Oriented x3] : oriented to person, place, and time [de-identified] : RR = 16; wearing nasal cannula oxygen; no audible wheezing noted [de-identified] : No audible stridor noted [de-identified] : Ecchymoses and bruises; onychomycosis and overgrown toenails

## 2022-03-02 NOTE — HISTORY OF PRESENT ILLNESS
[Home] : at home, [unfilled] , at the time of the visit. [Verbal consent obtained from patient] : the patient, [unfilled] [Medical Office: ([facility])___] : at the medical office located in  [Family Member] : family member [FreeTextEntry8] : Very concerned about easy bruisability.\haley Lately has been having GI issues.  Has increased GERD.  Denies any nausea or vomiting.  Does not think that she is losing weight.  Has reduced appetite.  Denies any black or bloody stools.  Not using her PPI appropriately.\haley Also continues to be concerned about hair loss and what she can do about it.\haley Also notices discoloration of her toenails and is concerned about that.\haley Wants to go for mammogram and ultrasound.  Needs prescription.\haley Brother wants her to undergo physical therapy in preparation for trip to radiology for mammography.\haley Also has issues with chronic fatigue.  Brother feels that this is increased when she does not use her BiPAP for long periods of time.  She did have Covid in the past.

## 2022-03-02 NOTE — ASSESSMENT
[FreeTextEntry1] : Anemia\par CBC results discussed with patient\par Hemoglobin 10.7 = hold injection this week\par Repeat CBC next week\par Hematology follow-up\par \par Easy bruisability\par Has normal platelet count\par Suspect related to the fact that she is on aspirin, prasugrel and Eliquis\par I have asked Michele to contact the patient's cardiologist to determine if any of these medications can be discontinued\par Dermatology follow-up advised\par Hematology follow-up advised\par \par GERD\par Lately has been having increased reflux symptoms/also notices reduced appetite\par Advised her to take pantoprazole first thing in the morning on an empty stomach\par For the next 2 weeks she can also take pantoprazole in the evening or she can use a dose of Pepcid in the evening\par She was advised to make a follow-up appointment with her gastroenterologist regarding her complaints\par She was advised to maintain an antireflux diet and precautions\par She should scale back on caffeine use\par She should take her medications with water not coffee\par She should avoid taking medications or drinking coffee on an empty stomach\par \par Hair loss\par Likely related to recent Covid infection\par Thyroid function testing and lab testing was unremarkable\par Can see dermatology\par She is planning to use Nutrafol\par \par Chronic fatigue\par May also be related to recent Covid infection\par Suspect that she may have elevated PCO2 when not using BiPAP enough also contributing to her fatigue\par She has chronic anemia that also likely contributes to her fatigue\par Advised to use BiPAP therapy overnight and for at least 2 hours during the day\par We will keep hemoglobin over 10\par \par Onychomycosis\par Advised brother to contact podiatry for in-home podiatry visit for nail care and diabetic foot care\par Trial of ciclopirox to treat nail fungus\par Dermatology follow-up\par \par Prescription for mammogram and bilateral breast ultrasound mailed to patient's home\par Prescription for physical therapy mailed to patient's home\par To do follow-up telemedicine visit in 4 to 6 weeks and as needed\par To call if status worsens or for any medical/pulmonary issues\par All of the above was discussed in detail with her and her brother and all of their multiple questions were answered\par They verbally confirmed understanding of all of the above and agreement with the above plan

## 2022-03-02 NOTE — HEALTH RISK ASSESSMENT
[Former] : Former [No falls in past year] : Patient reported no falls in the past year [No] : In the past 12 months have you used drugs other than those required for medical reasons? No [Patient refused screening] : Patient refused screening [de-identified] : None [de-identified] : Regular

## 2022-03-02 NOTE — CURRENT MEDS
[None] : Patient does not have any barriers to medication adherence [Takes medication as prescribed] : takes [Yes] : Reviewed medication list for presence of high-risk medications. [Benzodiazepines] : benzodiazepines [Blood Thinners] : blood thinners

## 2022-03-08 LAB
ANION GAP SERPL CALC-SCNC: 12 MMOL/L
BASOPHILS # BLD AUTO: 0.04 K/UL
BASOPHILS NFR BLD AUTO: 0.5 %
BUN SERPL-MCNC: 20 MG/DL
CALCIUM SERPL-MCNC: 8.7 MG/DL
CHLORIDE SERPL-SCNC: 97 MMOL/L
CO2 SERPL-SCNC: 33 MMOL/L
CREAT SERPL-MCNC: 1.31 MG/DL
EGFR: 46 ML/MIN/1.73M2
EOSINOPHIL # BLD AUTO: 0.18 K/UL
EOSINOPHIL NFR BLD AUTO: 2 %
GLUCOSE SERPL-MCNC: 257 MG/DL
HCT VFR BLD CALC: 37.8 %
HGB BLD-MCNC: 11.9 G/DL
IMM GRANULOCYTES NFR BLD AUTO: 1.8 %
LYMPHOCYTES # BLD AUTO: 1.48 K/UL
LYMPHOCYTES NFR BLD AUTO: 16.8 %
MAN DIFF?: NORMAL
MCHC RBC-ENTMCNC: 28.7 PG
MCHC RBC-ENTMCNC: 31.5 GM/DL
MCV RBC AUTO: 91.3 FL
MONOCYTES # BLD AUTO: 0.79 K/UL
MONOCYTES NFR BLD AUTO: 8.9 %
NEUTROPHILS # BLD AUTO: 6.18 K/UL
NEUTROPHILS NFR BLD AUTO: 70 %
PLATELET # BLD AUTO: 223 K/UL
POTASSIUM SERPL-SCNC: 5.1 MMOL/L
RBC # BLD: 4.14 M/UL
RBC # FLD: 14.1 %
SODIUM SERPL-SCNC: 142 MMOL/L
WBC # FLD AUTO: 8.83 K/UL

## 2022-03-18 LAB
ALBUMIN SERPL ELPH-MCNC: 4 G/DL
ALP BLD-CCNC: 88 U/L
ALT SERPL-CCNC: 16 U/L
ANION GAP SERPL CALC-SCNC: 10 MMOL/L
AST SERPL-CCNC: 9 U/L
BASOPHILS # BLD AUTO: 0.03 K/UL
BASOPHILS NFR BLD AUTO: 0.3 %
BILIRUB SERPL-MCNC: 0.4 MG/DL
BUN SERPL-MCNC: 24 MG/DL
CALCIUM SERPL-MCNC: 10.1 MG/DL
CHLORIDE SERPL-SCNC: 92 MMOL/L
CO2 SERPL-SCNC: 34 MMOL/L
CREAT SERPL-MCNC: 1.26 MG/DL
EGFR: 48 ML/MIN/1.73M2
EOSINOPHIL # BLD AUTO: 0.15 K/UL
EOSINOPHIL NFR BLD AUTO: 1.3 %
GLUCOSE SERPL-MCNC: 252 MG/DL
HCT VFR BLD CALC: 37.6 %
HGB BLD-MCNC: 11.8 G/DL
IMM GRANULOCYTES NFR BLD AUTO: 1.5 %
LYMPHOCYTES # BLD AUTO: 1.64 K/UL
LYMPHOCYTES NFR BLD AUTO: 14.6 %
MAN DIFF?: NORMAL
MCHC RBC-ENTMCNC: 28.3 PG
MCHC RBC-ENTMCNC: 31.4 GM/DL
MCV RBC AUTO: 90.2 FL
MONOCYTES # BLD AUTO: 0.75 K/UL
MONOCYTES NFR BLD AUTO: 6.7 %
NEUTROPHILS # BLD AUTO: 8.51 K/UL
NEUTROPHILS NFR BLD AUTO: 75.6 %
PLATELET # BLD AUTO: 212 K/UL
POTASSIUM SERPL-SCNC: 5.2 MMOL/L
PROT SERPL-MCNC: 6 G/DL
RBC # BLD: 4.17 M/UL
RBC # FLD: 14.2 %
SODIUM SERPL-SCNC: 136 MMOL/L
WBC # FLD AUTO: 11.25 K/UL

## 2022-03-21 ENCOUNTER — LABORATORY RESULT (OUTPATIENT)
Age: 62
End: 2022-03-21

## 2022-03-29 LAB
ANION GAP SERPL CALC-SCNC: 12 MMOL/L
BASOPHILS # BLD AUTO: 0.03 K/UL
BASOPHILS NFR BLD AUTO: 0.5 %
BUN SERPL-MCNC: 21 MG/DL
CALCIUM SERPL-MCNC: 9.5 MG/DL
CHLORIDE SERPL-SCNC: 99 MMOL/L
CO2 SERPL-SCNC: 32 MMOL/L
CREAT SERPL-MCNC: 1.28 MG/DL
EGFR: 47 ML/MIN/1.73M2
EOSINOPHIL # BLD AUTO: 0.23 K/UL
EOSINOPHIL NFR BLD AUTO: 4 %
GLUCOSE SERPL-MCNC: 188 MG/DL
HCT VFR BLD CALC: 35.4 %
HGB BLD-MCNC: 11 G/DL
IMM GRANULOCYTES NFR BLD AUTO: 1 %
LYMPHOCYTES # BLD AUTO: 0.98 K/UL
LYMPHOCYTES NFR BLD AUTO: 17.1 %
MAN DIFF?: NORMAL
MCHC RBC-ENTMCNC: 29.2 PG
MCHC RBC-ENTMCNC: 31.1 GM/DL
MCV RBC AUTO: 93.9 FL
MONOCYTES # BLD AUTO: 0.44 K/UL
MONOCYTES NFR BLD AUTO: 7.7 %
NEUTROPHILS # BLD AUTO: 3.98 K/UL
NEUTROPHILS NFR BLD AUTO: 69.7 %
PLATELET # BLD AUTO: 191 K/UL
POTASSIUM SERPL-SCNC: 5 MMOL/L
RBC # BLD: 3.77 M/UL
RBC # FLD: 14.4 %
SODIUM SERPL-SCNC: 143 MMOL/L
WBC # FLD AUTO: 5.72 K/UL

## 2022-04-05 ENCOUNTER — NON-APPOINTMENT (OUTPATIENT)
Age: 62
End: 2022-04-05

## 2022-04-06 ENCOUNTER — LABORATORY RESULT (OUTPATIENT)
Age: 62
End: 2022-04-06

## 2022-04-11 ENCOUNTER — LABORATORY RESULT (OUTPATIENT)
Age: 62
End: 2022-04-11

## 2022-04-19 ENCOUNTER — LABORATORY RESULT (OUTPATIENT)
Age: 62
End: 2022-04-19

## 2022-04-20 NOTE — OCCUPATIONAL THERAPY INITIAL EVALUATION ADULT - LEVEL OF INDEPENDENCE: SIT/SUPINE, REHAB EVAL
minimum assist (75% patients effort)/contact guard Ivermectin Counseling:  Patient instructed to take medication on an empty stomach with a full glass of water.  Patient informed of potential adverse effects including but not limited to nausea, diarrhea, dizziness, itching, and swelling of the extremities or lymph nodes.  The patient verbalized understanding of the proper use and possible adverse effects of ivermectin.  All of the patient's questions and concerns were addressed.

## 2022-04-29 LAB
ALBUMIN SERPL ELPH-MCNC: 4 G/DL
ALP BLD-CCNC: 68 U/L
ALT SERPL-CCNC: 17 U/L
ANION GAP SERPL CALC-SCNC: 11 MMOL/L
AST SERPL-CCNC: 20 U/L
BASOPHILS # BLD AUTO: 0.04 K/UL
BASOPHILS NFR BLD AUTO: 0.5 %
BILIRUB SERPL-MCNC: 0.3 MG/DL
BUN SERPL-MCNC: 20 MG/DL
CALCIUM SERPL-MCNC: 9.4 MG/DL
CHLORIDE SERPL-SCNC: 97 MMOL/L
CO2 SERPL-SCNC: 33 MMOL/L
CREAT SERPL-MCNC: 1.22 MG/DL
EGFR: 50 ML/MIN/1.73M2
EOSINOPHIL # BLD AUTO: 0.18 K/UL
EOSINOPHIL NFR BLD AUTO: 2.1 %
GLUCOSE SERPL-MCNC: 216 MG/DL
HCT VFR BLD CALC: 38.3 %
HGB BLD-MCNC: 12.1 G/DL
IMM GRANULOCYTES NFR BLD AUTO: 0.9 %
LYMPHOCYTES # BLD AUTO: 1.73 K/UL
LYMPHOCYTES NFR BLD AUTO: 20.5 %
MAN DIFF?: NORMAL
MCHC RBC-ENTMCNC: 29 PG
MCHC RBC-ENTMCNC: 31.6 GM/DL
MCV RBC AUTO: 91.8 FL
MONOCYTES # BLD AUTO: 0.66 K/UL
MONOCYTES NFR BLD AUTO: 7.8 %
NEUTROPHILS # BLD AUTO: 5.75 K/UL
NEUTROPHILS NFR BLD AUTO: 68.2 %
PLATELET # BLD AUTO: 160 K/UL
POTASSIUM SERPL-SCNC: 5.5 MMOL/L
PROT SERPL-MCNC: 5.9 G/DL
RBC # BLD: 4.17 M/UL
RBC # FLD: 13.6 %
SODIUM SERPL-SCNC: 141 MMOL/L
WBC # FLD AUTO: 8.44 K/UL

## 2022-05-02 ENCOUNTER — LABORATORY RESULT (OUTPATIENT)
Age: 62
End: 2022-05-02

## 2022-05-04 ENCOUNTER — RX RENEWAL (OUTPATIENT)
Age: 62
End: 2022-05-04

## 2022-05-05 LAB
ANION GAP SERPL CALC-SCNC: 14 MMOL/L
BASOPHILS # BLD AUTO: 0.03 K/UL
BASOPHILS NFR BLD AUTO: 0.3 %
BUN SERPL-MCNC: 24 MG/DL
CALCIUM SERPL-MCNC: 9.4 MG/DL
CHLORIDE SERPL-SCNC: 96 MMOL/L
CO2 SERPL-SCNC: 35 MMOL/L
CREAT SERPL-MCNC: 1.26 MG/DL
EGFR: 48 ML/MIN/1.73M2
EOSINOPHIL # BLD AUTO: 0.15 K/UL
EOSINOPHIL NFR BLD AUTO: 1.7 %
GLUCOSE SERPL-MCNC: 218 MG/DL
HCT VFR BLD CALC: 35.3 %
HGB BLD-MCNC: 11.4 G/DL
IMM GRANULOCYTES NFR BLD AUTO: 1 %
LYMPHOCYTES # BLD AUTO: 1.41 K/UL
LYMPHOCYTES NFR BLD AUTO: 16.1 %
MAN DIFF?: NORMAL
MCHC RBC-ENTMCNC: 28.9 PG
MCHC RBC-ENTMCNC: 32.3 GM/DL
MCV RBC AUTO: 89.4 FL
MONOCYTES # BLD AUTO: 0.74 K/UL
MONOCYTES NFR BLD AUTO: 8.5 %
NEUTROPHILS # BLD AUTO: 6.32 K/UL
NEUTROPHILS NFR BLD AUTO: 72.4 %
PLATELET # BLD AUTO: 221 K/UL
POTASSIUM SERPL-SCNC: 4.3 MMOL/L
RBC # BLD: 3.95 M/UL
RBC # FLD: 13.4 %
SODIUM SERPL-SCNC: 145 MMOL/L
WBC # FLD AUTO: 8.74 K/UL

## 2022-05-05 RX ORDER — ALBUTEROL SULFATE 2.5 MG/3ML
(2.5 MG/3ML) SOLUTION RESPIRATORY (INHALATION)
Qty: 360 | Refills: 0 | Status: ACTIVE | COMMUNITY
Start: 2021-06-09 | End: 1900-01-01

## 2022-05-09 ENCOUNTER — RX RENEWAL (OUTPATIENT)
Age: 62
End: 2022-05-09

## 2022-05-10 ENCOUNTER — RX RENEWAL (OUTPATIENT)
Age: 62
End: 2022-05-10

## 2022-05-11 ENCOUNTER — RX RENEWAL (OUTPATIENT)
Age: 62
End: 2022-05-11

## 2022-05-12 ENCOUNTER — LABORATORY RESULT (OUTPATIENT)
Age: 62
End: 2022-05-12

## 2022-05-16 ENCOUNTER — RX RENEWAL (OUTPATIENT)
Age: 62
End: 2022-05-16

## 2022-05-20 LAB
ANION GAP SERPL CALC-SCNC: 7 MMOL/L
BASOPHILS # BLD AUTO: 0.03 K/UL
BASOPHILS NFR BLD AUTO: 0.4 %
BUN SERPL-MCNC: 15 MG/DL
CALCIUM SERPL-MCNC: 9.4 MG/DL
CHLORIDE SERPL-SCNC: 92 MMOL/L
CO2 SERPL-SCNC: 42 MMOL/L
CREAT SERPL-MCNC: 1.35 MG/DL
EGFR: 44 ML/MIN/1.73M2
EOSINOPHIL # BLD AUTO: 0.09 K/UL
EOSINOPHIL NFR BLD AUTO: 1.3 %
GLUCOSE SERPL-MCNC: 252 MG/DL
HCT VFR BLD CALC: 33.2 %
HGB BLD-MCNC: 10.4 G/DL
IMM GRANULOCYTES NFR BLD AUTO: 0.7 %
LYMPHOCYTES # BLD AUTO: 1.25 K/UL
LYMPHOCYTES NFR BLD AUTO: 17.7 %
MAN DIFF?: NORMAL
MCHC RBC-ENTMCNC: 29.1 PG
MCHC RBC-ENTMCNC: 31.3 GM/DL
MCV RBC AUTO: 93 FL
MONOCYTES # BLD AUTO: 0.66 K/UL
MONOCYTES NFR BLD AUTO: 9.3 %
NEUTROPHILS # BLD AUTO: 4.98 K/UL
NEUTROPHILS NFR BLD AUTO: 70.6 %
PLATELET # BLD AUTO: 189 K/UL
POTASSIUM SERPL-SCNC: 4.8 MMOL/L
RBC # BLD: 3.57 M/UL
RBC # FLD: 13.3 %
SODIUM SERPL-SCNC: 141 MMOL/L
WBC # FLD AUTO: 7.06 K/UL

## 2022-05-24 ENCOUNTER — LABORATORY RESULT (OUTPATIENT)
Age: 62
End: 2022-05-24

## 2022-05-25 NOTE — PATIENT PROFILE ADULT - INFLUENZA IMMUNIZATION DATE (APPROXIMATE)
22-Sep-2020 Bactrim Pregnancy And Lactation Text: This medication is Pregnancy Category D and is known to cause fetal risk.  It is also excreted in breast milk.

## 2022-05-26 ENCOUNTER — APPOINTMENT (OUTPATIENT)
Dept: INTERNAL MEDICINE | Facility: CLINIC | Age: 62
End: 2022-05-26
Payer: COMMERCIAL

## 2022-05-26 DIAGNOSIS — R19.5 OTHER FECAL ABNORMALITIES: ICD-10-CM

## 2022-05-26 PROCEDURE — 99214 OFFICE O/P EST MOD 30 MIN: CPT | Mod: 25,95

## 2022-05-26 NOTE — REVIEW OF SYSTEMS
[Fatigue] : fatigue [Vision Problems] : vision problems [Postnasal Drip] : postnasal drip [Leg Claudication] : leg claudication [Shortness Of Breath] : shortness of breath [Dyspnea on Exertion] : dyspnea on exertion [Heartburn] : heartburn [Joint Pain] : joint pain [Muscle Weakness] : muscle weakness [Muscle Pain] : muscle pain [Back Pain] : back pain [Itching] : Itching [Nail Changes] : nail changes [Hair Changes] : hair changes [Skin Rash] : skin rash [Unsteady Walking] : ataxia [Anxiety] : anxiety [Easy Bruising] : easy bruising [Negative] : Heme/Lymph

## 2022-05-30 PROBLEM — R19.5 DARK STOOLS: Status: ACTIVE | Noted: 2017-10-11

## 2022-05-30 NOTE — ASSESSMENT
[FreeTextEntry1] : Anemia\par CBC results discussed with patient\par Hold injection this week\par Repeat CBC next week\par Hematology follow-up\par \par Dark stools\par ? GI bleeding\par On multiple agents for CAD \par Also notices reduced appetite\par Advised her to take pantoprazole first thing in the morning on an empty stomach and a dose of Pepcid in the evening\par She was advised to make a follow-up appointment with her gastroenterologist regarding her complaints\par She was advised to maintain an antireflux diet and precautions\par She should scale back on caffeine use\par She should take her medications with water not coffee\par She should avoid taking medications or drinking coffee on an empty stomach\par Monitor CBC\par Will resend FIT testing\par \par COPD\par Complains of labored breathing\par ?COPD flaring\par Suspect that she is not using BiPAP enough\par Advised to use BiPAP therapy overnight and for at least 4 hours during the day\par Prednisone 30 mg daily for 5 - 7 days\par Continue usual COPD meds\par We will keep hemoglobin over 10\par \par To do f/u TTM next week and as needed\par To do follow-up telemedicine visit in 4 to 6 weeks and as needed\par To call if status worsens or does not improve or for any medical/pulmonary issues\par All of the above was discussed in detail with her and her brother and all of their multiple questions were answered\par They verbally confirmed understanding of all of the above and agreement with the above plan

## 2022-05-30 NOTE — HEALTH RISK ASSESSMENT
[Former] : Former [No] : In the past 12 months have you used drugs other than those required for medical reasons? No [No falls in past year] : Patient reported no falls in the past year [Patient refused screening] : Patient refused screening [de-identified] : None [de-identified] : Regular

## 2022-05-30 NOTE — HISTORY OF PRESENT ILLNESS
[Home] : at home, [unfilled] , at the time of the visit. [Medical Office: ([facility])___] : at the medical office located in  [Family Member] : family member [Verbal consent obtained from patient] : the patient, [unfilled] [FreeTextEntry8] : Seen with her brother Michele.\haley Remains in bed on oxygen via nasal cannula.\haley Has not yet started home physical therapy.\par Very immobile.\haley Continues to report reduced appetite.  Likes to have protein shakes.  Denies any abdominal pain.  Denies any nausea or vomiting.  Moving her bowels.  Feels that her stools are slightly darker.  Claims that she did not receive FIT testing kit.  Will resend.\haley Denies any chest pain.  Denies any cough or hemoptysis.  Denies any fevers.  Feels that her breathing is slightly more labored.  Brother reports that she only uses her BiPAP when sleeping at night.  Does not use it as recommended during the day.\haley Wants to review recent labs.

## 2022-05-30 NOTE — PHYSICAL EXAM
[No Acute Distress] : no acute distress [Well Nourished] : well nourished [Well Developed] : well developed [Well-Appearing] : well-appearing [Normal Voice/Communication] : normal voice/communication [Normal Sclera/Conjunctiva] : normal sclera/conjunctiva [Normal Outer Ear/Nose] : the outer ears and nose were normal in appearance [No Respiratory Distress] : no respiratory distress  [No Accessory Muscle Use] : no accessory muscle use [Speech Grossly Normal] : speech grossly normal [Normal Affect] : the affect was normal [Alert and Oriented x3] : oriented to person, place, and time [de-identified] : No audible stridor noted [de-identified] : RR = 16; wearing nasal cannula oxygen; no audible wheezing noted

## 2022-05-31 ENCOUNTER — LABORATORY RESULT (OUTPATIENT)
Age: 62
End: 2022-05-31

## 2022-06-01 ENCOUNTER — RX RENEWAL (OUTPATIENT)
Age: 62
End: 2022-06-01

## 2022-06-01 ENCOUNTER — RX CHANGE (OUTPATIENT)
Age: 62
End: 2022-06-01

## 2022-06-01 RX ORDER — FLUTICASONE PROPIONATE 50 UG/1
50 SPRAY, METERED NASAL
Qty: 48 | Refills: 0 | Status: DISCONTINUED | COMMUNITY
Start: 2021-06-28 | End: 2022-06-01

## 2022-06-02 RX ORDER — ALPRAZOLAM 0.5 MG/1
0.5 TABLET ORAL 4 TIMES DAILY
Qty: 120 | Refills: 0 | Status: ACTIVE | COMMUNITY
Start: 2021-04-15

## 2022-06-06 ENCOUNTER — LABORATORY RESULT (OUTPATIENT)
Age: 62
End: 2022-06-06

## 2022-06-06 ENCOUNTER — RX RENEWAL (OUTPATIENT)
Age: 62
End: 2022-06-06

## 2022-06-09 ENCOUNTER — RX RENEWAL (OUTPATIENT)
Age: 62
End: 2022-06-09

## 2022-06-15 ENCOUNTER — RX RENEWAL (OUTPATIENT)
Age: 62
End: 2022-06-15

## 2022-06-17 LAB
ANION GAP SERPL CALC-SCNC: 3 MMOL/L
BASOPHILS # BLD AUTO: 0.02 K/UL
BASOPHILS NFR BLD AUTO: 0.3 %
BUN SERPL-MCNC: 22 MG/DL
CALCIUM SERPL-MCNC: 9.9 MG/DL
CHLORIDE SERPL-SCNC: 91 MMOL/L
CO2 SERPL-SCNC: 44 MMOL/L
CREAT SERPL-MCNC: 1.19 MG/DL
EGFR: 51 ML/MIN/1.73M2
EOSINOPHIL # BLD AUTO: 0.16 K/UL
EOSINOPHIL NFR BLD AUTO: 2.1 %
GLUCOSE SERPL-MCNC: 239 MG/DL
HCT VFR BLD CALC: 31.6 %
HGB BLD-MCNC: 10.3 G/DL
IMM GRANULOCYTES NFR BLD AUTO: 0.4 %
LYMPHOCYTES # BLD AUTO: 1.31 K/UL
LYMPHOCYTES NFR BLD AUTO: 17 %
MAN DIFF?: NORMAL
MCHC RBC-ENTMCNC: 29.8 PG
MCHC RBC-ENTMCNC: 32.6 GM/DL
MCV RBC AUTO: 91.3 FL
MONOCYTES # BLD AUTO: 0.73 K/UL
MONOCYTES NFR BLD AUTO: 9.5 %
NEUTROPHILS # BLD AUTO: 5.45 K/UL
NEUTROPHILS NFR BLD AUTO: 70.7 %
PLATELET # BLD AUTO: 188 K/UL
POTASSIUM SERPL-SCNC: 5.2 MMOL/L
RBC # BLD: 3.46 M/UL
RBC # FLD: 14 %
SODIUM SERPL-SCNC: 138 MMOL/L
WBC # FLD AUTO: 7.7 K/UL

## 2022-06-20 ENCOUNTER — LABORATORY RESULT (OUTPATIENT)
Age: 62
End: 2022-06-20

## 2022-06-27 ENCOUNTER — LABORATORY RESULT (OUTPATIENT)
Age: 62
End: 2022-06-27

## 2022-06-29 ENCOUNTER — APPOINTMENT (OUTPATIENT)
Dept: INTERNAL MEDICINE | Facility: CLINIC | Age: 62
End: 2022-06-29

## 2022-06-29 LAB
ANION GAP SERPL CALC-SCNC: 3 MMOL/L
BASOPHILS # BLD AUTO: 0.02 K/UL
BASOPHILS NFR BLD AUTO: 0.3 %
BUN SERPL-MCNC: 23 MG/DL
CALCIUM SERPL-MCNC: 9.6 MG/DL
CHLORIDE SERPL-SCNC: 94 MMOL/L
CO2 SERPL-SCNC: 41 MMOL/L
CREAT SERPL-MCNC: 1.22 MG/DL
EGFR: 50 ML/MIN/1.73M2
EOSINOPHIL # BLD AUTO: 0.02 K/UL
EOSINOPHIL NFR BLD AUTO: 0.3 %
GLUCOSE SERPL-MCNC: 299 MG/DL
HCT VFR BLD CALC: 34.8 %
HGB BLD-MCNC: 10.9 G/DL
IMM GRANULOCYTES NFR BLD AUTO: 1.5 %
LYMPHOCYTES # BLD AUTO: 0.46 K/UL
LYMPHOCYTES NFR BLD AUTO: 5.8 %
MAN DIFF?: NORMAL
MCHC RBC-ENTMCNC: 29.1 PG
MCHC RBC-ENTMCNC: 31.3 GM/DL
MCV RBC AUTO: 93 FL
MONOCYTES # BLD AUTO: 0.18 K/UL
MONOCYTES NFR BLD AUTO: 2.3 %
NEUTROPHILS # BLD AUTO: 7.1 K/UL
NEUTROPHILS NFR BLD AUTO: 89.8 %
PLATELET # BLD AUTO: 260 K/UL
POTASSIUM SERPL-SCNC: 5.5 MMOL/L
RBC # BLD: 3.74 M/UL
RBC # FLD: 14.1 %
SODIUM SERPL-SCNC: 139 MMOL/L
WBC # FLD AUTO: 7.9 K/UL

## 2022-06-29 PROCEDURE — 99213 OFFICE O/P EST LOW 20 MIN: CPT | Mod: 95

## 2022-06-29 NOTE — HISTORY OF PRESENT ILLNESS
[Home] : at home, [unfilled] , at the time of the visit. [Medical Office: ([facility])___] : at the medical office located in  [Family Member] : family member [Verbal consent obtained from patient] : the patient, [unfilled] [FreeTextEntry8] : Seen with her brother Michele.\haley Remains in bed on oxygen via nasal cannula.\haley Wants to review recent labs.\par Recently noticed swelling of left forearm.  Denies any trauma.  Denies any tenderness, warmth, coolness, or discharge from the area.  Does have her blood drawn from the antecubital fossa on that arm but not done there this week.  Does notice bruising in the area.  Denies any rash or vesicular lesions.  Denies any fevers or chills.  Does not feel any cords or any axillary pain.  She denies any pleuritic chest pain or increased shortness of breath or hemoptysis.  She denies any edema, calf pain.  She denies any axillary or breast mass.  Her upper arm is not swollen as per her brother.

## 2022-06-29 NOTE — PHYSICAL EXAM
[No Acute Distress] : no acute distress [Well Nourished] : well nourished [Well Developed] : well developed [Well-Appearing] : well-appearing [Normal Voice/Communication] : normal voice/communication [Normal Sclera/Conjunctiva] : normal sclera/conjunctiva [Normal Outer Ear/Nose] : the outer ears and nose were normal in appearance [No Respiratory Distress] : no respiratory distress  [No Accessory Muscle Use] : no accessory muscle use [Speech Grossly Normal] : speech grossly normal [Normal Affect] : the affect was normal [Alert and Oriented x3] : oriented to person, place, and time [de-identified] : No audible stridor noted [de-identified] : RR = 16; wearing nasal cannula oxygen; no audible wheezing noted [de-identified] : Has areas of bruising left forearm with mild swelling of left forearm

## 2022-06-29 NOTE — ASSESSMENT
[FreeTextEntry1] : Anemia\par CBC results discussed with patient\par Hold injection this week\par Repeat CBC next week\par Hematology follow-up\par See 6/29/2022 result note\par \par Left forearm swelling\par Etiology unclear\par ?  Inadvertent trauma with ecchymoses and swelling as she is on multiple blood thinners\par ?  Superficial phlebitis in area of prior phlebotomy\par They declined ER visit or urgent care visit\par They declined radiology visit for left upper extremity Doppler\par She will keep her upper extremity elevated on pillows\par She will apply warm soaks\par Short course of antibiotic therapy with Keflex 3 times daily for 7 days\par \par To do f/u TTM next week and as needed\par To call if status worsens or does not improve or for any medical/pulmonary issues\par All of the above was discussed in detail with her and her brother and all of their multiple questions were answered\par They verbally confirmed understanding of all of the above and agreement with the above plan

## 2022-06-29 NOTE — HEALTH RISK ASSESSMENT
[Former] : Former [No] : In the past 12 months have you used drugs other than those required for medical reasons? No [No falls in past year] : Patient reported no falls in the past year [Patient refused screening] : Patient refused screening [de-identified] : None/PT [de-identified] : Regular

## 2022-07-01 NOTE — PHYSICAL EXAM
Regular rate and rhythm, Heart sounds S1 S2 present, no murmurs, rubs or gallops [No Acute Distress] : no acute distress [Well Nourished] : well nourished [Well Developed] : well developed [Well-Appearing] : well-appearing [Normal Voice/Communication] : normal voice/communication [Normal Sclera/Conjunctiva] : normal sclera/conjunctiva [PERRL] : pupils equal round and reactive to light [EOMI] : extraocular movements intact [Normal Outer Ear/Nose] : the outer ears and nose were normal in appearance [Normal Oropharynx] : the oropharynx was normal [No JVD] : no jugular venous distention [Supple] : supple [No Respiratory Distress] : no respiratory distress  [Clear to Auscultation] : lungs were clear to auscultation bilaterally [No Accessory Muscle Use] : no accessory muscle use [Normal Rate] : normal rate  [Regular Rhythm] : with a regular rhythm [Normal S1, S2] : normal S1 and S2 [No Carotid Bruits] : no carotid bruits [Pedal Pulses Present] : the pedal pulses are present [No Edema] : there was no peripheral edema [No Extremity Clubbing/Cyanosis] : no extremity clubbing/cyanosis [Soft] : abdomen soft [Normal Bowel Sounds] : normal bowel sounds [No CVA Tenderness] : no CVA  tenderness [No Spinal Tenderness] : no spinal tenderness [No Rash] : no rash [Normal Gait] : normal gait [No Focal Deficits] : no focal deficits [Speech Grossly Normal] : speech grossly normal [Alert and Oriented x3] : oriented to person, place, and time [High Complexity requires an extensive number of possible diagnoses and/or the management options, extensive complexity of the medical data (tests, etc.) to be reviewed, and a high risk of significant complications, morbidity, and/or mortality as well as c] : High Complexity  [de-identified] : Wears NC oxygen; pursed-lip breathing [de-identified] : no ST [de-identified] : no stridor [de-identified] : R=16-20;  very diminished AE; no wheezing [de-identified] : no cords; ?varicosity/hematoma with overlying erythema, warmth, and tenderness; ecchymoses/bruising over upper surface of right foot; no cords [de-identified] : tearful at times

## 2022-07-07 LAB
ANION GAP SERPL CALC-SCNC: 14 MMOL/L
BASOPHILS # BLD AUTO: 0.02 K/UL
BASOPHILS NFR BLD AUTO: 0.2 %
BUN SERPL-MCNC: 24 MG/DL
CALCIUM SERPL-MCNC: 9 MG/DL
CHLORIDE SERPL-SCNC: 89 MMOL/L
CO2 SERPL-SCNC: 38 MMOL/L
CREAT SERPL-MCNC: 1.19 MG/DL
EGFR: 51 ML/MIN/1.73M2
EOSINOPHIL # BLD AUTO: 0.12 K/UL
EOSINOPHIL NFR BLD AUTO: 1.2 %
GLUCOSE SERPL-MCNC: 202 MG/DL
HCT VFR BLD CALC: 34.4 %
HGB BLD-MCNC: 10.5 G/DL
IMM GRANULOCYTES NFR BLD AUTO: 1.1 %
LYMPHOCYTES # BLD AUTO: 1.47 K/UL
LYMPHOCYTES NFR BLD AUTO: 14.8 %
MAN DIFF?: NORMAL
MCHC RBC-ENTMCNC: 29.2 PG
MCHC RBC-ENTMCNC: 30.5 GM/DL
MCV RBC AUTO: 95.8 FL
MONOCYTES # BLD AUTO: 0.85 K/UL
MONOCYTES NFR BLD AUTO: 8.5 %
NEUTROPHILS # BLD AUTO: 7.39 K/UL
NEUTROPHILS NFR BLD AUTO: 74.2 %
PLATELET # BLD AUTO: 215 K/UL
POTASSIUM SERPL-SCNC: 4.4 MMOL/L
RBC # BLD: 3.59 M/UL
RBC # FLD: 13.4 %
SODIUM SERPL-SCNC: 141 MMOL/L
WBC # FLD AUTO: 9.96 K/UL

## 2022-07-13 ENCOUNTER — RX RENEWAL (OUTPATIENT)
Age: 62
End: 2022-07-13

## 2022-07-15 LAB
ANION GAP SERPL CALC-SCNC: 9 MMOL/L
BASOPHILS # BLD AUTO: 0.02 K/UL
BASOPHILS NFR BLD AUTO: 0.2 %
BUN SERPL-MCNC: 20 MG/DL
CALCIUM SERPL-MCNC: 9.4 MG/DL
CHLORIDE SERPL-SCNC: 90 MMOL/L
CO2 SERPL-SCNC: 42 MMOL/L
CREAT SERPL-MCNC: 1.23 MG/DL
EGFR: 49 ML/MIN/1.73M2
EOSINOPHIL # BLD AUTO: 0.14 K/UL
EOSINOPHIL NFR BLD AUTO: 1.2 %
GLUCOSE SERPL-MCNC: 240 MG/DL
HCT VFR BLD CALC: 31.9 %
HGB BLD-MCNC: 9.9 G/DL
IMM GRANULOCYTES NFR BLD AUTO: 0.7 %
LYMPHOCYTES # BLD AUTO: 1.36 K/UL
LYMPHOCYTES NFR BLD AUTO: 11.3 %
MAN DIFF?: NORMAL
MCHC RBC-ENTMCNC: 29.2 PG
MCHC RBC-ENTMCNC: 31 GM/DL
MCV RBC AUTO: 94.1 FL
MONOCYTES # BLD AUTO: 1.06 K/UL
MONOCYTES NFR BLD AUTO: 8.8 %
NEUTROPHILS # BLD AUTO: 9.36 K/UL
NEUTROPHILS NFR BLD AUTO: 77.8 %
PLATELET # BLD AUTO: 195 K/UL
POTASSIUM SERPL-SCNC: 5.1 MMOL/L
RBC # BLD: 3.39 M/UL
RBC # FLD: 13.6 %
SODIUM SERPL-SCNC: 141 MMOL/L
WBC # FLD AUTO: 12.03 K/UL

## 2022-07-18 ENCOUNTER — TRANSCRIPTION ENCOUNTER (OUTPATIENT)
Age: 62
End: 2022-07-18

## 2022-07-19 ENCOUNTER — NON-APPOINTMENT (OUTPATIENT)
Age: 62
End: 2022-07-19

## 2022-07-19 LAB
ANION GAP SERPL CALC-SCNC: 6 MMOL/L
BASOPHILS # BLD AUTO: 0.03 K/UL
BASOPHILS NFR BLD AUTO: 0.3 %
BUN SERPL-MCNC: 16 MG/DL
CALCIUM SERPL-MCNC: 9.7 MG/DL
CHLORIDE SERPL-SCNC: 83 MMOL/L
CO2 SERPL-SCNC: 47 MMOL/L
CREAT SERPL-MCNC: 1.24 MG/DL
EGFR: 49 ML/MIN/1.73M2
EOSINOPHIL # BLD AUTO: 0.02 K/UL
EOSINOPHIL NFR BLD AUTO: 0.2 %
GLUCOSE SERPL-MCNC: 373 MG/DL
HCT VFR BLD CALC: 32.7 %
HGB BLD-MCNC: 10.2 G/DL
IMM GRANULOCYTES NFR BLD AUTO: 1.8 %
LYMPHOCYTES # BLD AUTO: 0.55 K/UL
LYMPHOCYTES NFR BLD AUTO: 5.2 %
MAN DIFF?: NORMAL
MCHC RBC-ENTMCNC: 29 PG
MCHC RBC-ENTMCNC: 31.2 GM/DL
MCV RBC AUTO: 92.9 FL
MONOCYTES # BLD AUTO: 0.31 K/UL
MONOCYTES NFR BLD AUTO: 3 %
NEUTROPHILS # BLD AUTO: 9.38 K/UL
NEUTROPHILS NFR BLD AUTO: 89.5 %
PLATELET # BLD AUTO: 224 K/UL
POTASSIUM SERPL-SCNC: 5.8 MMOL/L
RBC # BLD: 3.52 M/UL
RBC # FLD: 13.6 %
SODIUM SERPL-SCNC: 136 MMOL/L
WBC # FLD AUTO: 10.48 K/UL

## 2022-07-20 ENCOUNTER — TRANSCRIPTION ENCOUNTER (OUTPATIENT)
Age: 62
End: 2022-07-20

## 2022-07-22 LAB
ANION GAP SERPL CALC-SCNC: 8 MMOL/L
BUN SERPL-MCNC: 14 MG/DL
CALCIUM SERPL-MCNC: 9.5 MG/DL
CHLORIDE SERPL-SCNC: 79 MMOL/L
CO2 SERPL-SCNC: 48 MMOL/L
CREAT SERPL-MCNC: 1.22 MG/DL
EGFR: 50 ML/MIN/1.73M2
GLUCOSE SERPL-MCNC: 259 MG/DL
POTASSIUM SERPL-SCNC: 4.5 MMOL/L
SODIUM SERPL-SCNC: 135 MMOL/L

## 2022-07-26 NOTE — GOALS OF CARE CONVERSATION - ADVANCED CARE PLANNING - CAREGIVER NAME
PEDIATRIC TRANSPLANT CARDIOLOGY NOTE    07/26/2022    Cruzito Ann MD  [address] [facility] [address]    Dear Dr. Ann:    CHIEF COMPLAINT: Orthotopic heart transplant    HISTORY OF PRESENT ILLNESS: James is a 15 y.o. 7 m.o. male who presents to transplant cardiology clinic for ongoing management in transplant cardiology.   Born with TAPVR repaired at [facility].  James underwent orthotopic heart transplant on February 3, 2019 due to dilated cardiomyopathy and ventricular tachycardia. Tacrolimus was subsequently switched to cyclosporine due to diabetes, but switched back after an acute rejection episode September 21, 2020.    Admitted September 21, 2020 with a few days of symptoms suggestive of cardiac rejection.  He also had been started on Zinc and cimetidine for treatment of warts previous to this.  September 22, 2020 myocardial biopsy showed severe acute cellular rejection, grade III.  He required intubation and ECMO via right leg cannulation on September 24, 2020 secondary to multi organ failure with low cardiac output.  He was on dialysis for a brief amount of time.  He was extubated September 28, 2020 and decannulated from ECMO September 30, 2020.  He was treated with high-dose steroids and Thymoglobulin.  His cyclosporin was switched to tacrolimus.  Repeat biopsy performed October 6, 2020 showed no evidence of rejection.  Hospitalization was complicated by compartment syndrome in the right leg that required surgical therapy with fasciotomies on October 3rd.  Skin was ultimately closed October 9, 2020.  He also had a thoracotomy wound infection requiring placement of a wound VAC and IV antibiotics.  Patient was discharged home on IV cefepime and micafungin.    Patient was admitted January 4-15, 2021 with several days of right calf pain with swelling and fever that had progressed rapidly over 1 day. Ultrasound and MRI confirmed an abscess.  Interventional  Radiology placed a drain January 6, 2020, draining 150 mL of purulent fluid.  Ultimately, he was placed on Ancef and cultures revealed methicillin sensitive Staph aureus.  He was discharged home on January 15, 2021.    Patient s/p multiple subsequent admissions for heart failure without evidence of rejection.      Interval history:  Since last visit, he has done well.  His weight is stable.  He denies orthopnea.  No fever.  No problems with his PICC line site.  No cough or rhinorrhea.  He has his baseline shortness of breath.  No chest pain or palpitations.    Transplant history  Transplant Date: 2/3/2019 (Heart)  Underlying cardiac diagnosis: Dilated cardiomyopathy, TAPVR w inferior vertical vein  History of mechanical circulatory support: None prior to transplant but was on ECMO for severe rejection September 2020.  Transplant center: [facility]    Rejection  History of rejection: yes, September 21, 2020 with Grade III cellular rejection. May 19/2021 with mild AMR.   10/24/21- Admitted for HF symptoms. Had been given oral pred by PCP for 3 days prior for cough. Found to have decreased biventricular systolic function. Biopsy was negative, likely due to pre-treatment of steroids. He received IV pulse steroids and was tapered to oral steroids. Sirolimus started for additional coverage.     Infection  History of infection:  Yes - left thoracotomy wound infection related to ECMO September 2020, pseudomonas.  MSSA from calf wound.    Cardiac allograft vasculopathy: Yes  Severe, diffuse small vessel disease seen on cath 11/20/21 with functional upgrading    Last cardiac catheterization:  February 23, 2021    Baseline Immunosuppression:  Tacrolimus and MMF, and Sirolimus added on 10/24/21 admission    Medication compliance addressed  Missed doses: None  Late doses (>15 minutes): None  Knows medicine names:Patient-- All meds  Knows medication doses:  Yes  Diagnosis of diabetes mellitus post transplant  May 2019 - followed by endocrine    The review of systems is as noted above. It is otherwise negative for other symptoms related to the general, neurological, psychiatric, endocrine, gastrointestinal, genitourinary, respiratory, dermatologic, musculoskeletal, hematologic, and immunologic systems.    PAST MEDICAL HISTORY:   Past Medical History:   Diagnosis Date    CHF (congestive heart failure)     Coronary artery disease     Diabetes mellitus     Dilated cardiomyopathy 2019    Encounter for blood transfusion     Organ transplant     TAPVR (total anomalous pulmonary venous return) 2004     FAMILY HISTORY:   Family History   Problem Relation Age of Onset    Heart disease Paternal Grandfather     Melanoma Neg Hx     Psoriasis Neg Hx     Lupus Neg Hx     Eczema Neg Hx      SOCIAL HISTORY:   Social History     Socioeconomic History    Marital status: Single   Tobacco Use    Smoking status: Never Smoker    Smokeless tobacco: Never Used   Substance and Sexual Activity    Alcohol use: Never    Drug use: Never    Sexual activity: Never   Social History Narrative    Lives at home with parents and siblings. Attends [employer] fall 22       ALLERGIES:  Review of patient's allergies indicates:   Allergen Reactions    Measles (rubeola) vaccines      No live virus vaccines in transplant recipients    Nsaids (non-steroidal anti-inflammatory drug)      Renal failure with transplant medications    Varicella vaccines      Live virus vaccine    Grapefruit      Interacts with transplant medications       MEDICATIONS:    Current Outpatient Medications:     amiodarone (PACERONE) 200 MG Tab, Take 1 tablet (200 mg total) by mouth once daily., Disp: 30 tablet, Rfl: 11    blood sugar diagnostic (TRUE METRIX GLUCOSE TEST STRIP) Strp, TEST BLOOD SUGAR UP TO 8 TIMES PER DAY., Disp: 200 each, Rfl: 4    blood-glucose meter,continuous (DEXCOM G6 ) Misc, For use with dexcom continuous glucose  "monitoring system, Disp: 1 each, Rfl: 1    blood-glucose sensor (DEXCOM G6 SENSOR) Cely, Use for continuous glucose monitoring;change as needed up to 10 day wear., Disp: 3 each, Rfl: 12    blood-glucose transmitter (DEXCOM G6 TRANSMITTER) Cely, Use with dexcom sensor for continuous glucose monitoring; change as indicated when polina life ends up to 90 day use, Disp: 2 Device, Rfl: 4    DULoxetine (CYMBALTA) 60 MG capsule, Take 1 capsule (60 mg total) by mouth once daily., Disp: 30 capsule, Rfl: 11    famotidine (PEPCID) 20 MG tablet, Take 1 tablet (20 mg total) by mouth 2 (two) times daily., Disp: 60 tablet, Rfl: 11    furosemide (LASIX) 40 MG tablet, Take 1 tablet (40 mg total) by mouth once daily at 6am., Disp: 30 tablet, Rfl: 11    gabapentin (NEURONTIN) 300 MG capsule, Take 1 capsule (300 mg total) by mouth 3 (three) times daily., Disp: 90 capsule, Rfl: 2    insulin pump cart,automated,BT (OMNIPOD 5 G6 PODS, GEN 5,) Crtg, 1 Device by subcutaneous (via wearable injector) route every other day., Disp: 15 each, Rfl: 2    milrinone lactate (MILRINONE IV), Inject into the vein., Disp: , Rfl:     mycophenolate (CELLCEPT) 500 mg Tab, Take 2 tablets (1,000 mg total) by mouth 2 (two) times daily., Disp: 180 tablet, Rfl: 11    pen needle, diabetic (BD ULTRA-FINE DEACON PEN NEEDLE) 32 gauge x 5/32" Ndle, USE UP TO 8 NEEDLES DAILY TO ADMINISTER INSULIN, Disp: 250 each, Rfl: 2    pravastatin (PRAVACHOL) 20 MG tablet, Take 1 tablet (20 mg total) by mouth every morning., Disp: 90 tablet, Rfl: 11    sirolimus (RAPAMUNE) 1 MG Tab, Take 4 tablets (4 mg total) by mouth once daily., Disp: 360 tablet, Rfl: 3    spironolactone (ALDACTONE) 25 MG tablet, Take 1 tablet (25 mg total) by mouth once daily., Disp: 30 tablet, Rfl: 11    tacrolimus (PROGRAF) 1 MG Cap, Take 3 capsules (3 mg total) by mouth every 12 (twelve) hours. (Patient taking differently: Take 4 mg by mouth every 12 (twelve) hours.), Disp: 180 capsule, Rfl: " Michele Vega "11    albuterol (PROAIR HFA) 90 mcg/actuation inhaler, Inhale 2 puffs into the lungs every 4 (four) hours as needed for Shortness of Breath. Rescue, Disp: 8 g, Rfl: 3    aspirin 81 MG Chew, Take 1 tablet (81 mg total) by mouth once daily., Disp: , Rfl: 11      PHYSICAL EXAM:   Vitals:    07/26/22 0954   BP: 113/66   BP Location: Left arm   Patient Position: Sitting   Pulse: 109   Weight: 55 kg (121 lb 5.8 oz)   Height: 5' 8.47" (1.739 m)   /66 (BP Location: Left arm, Patient Position: Sitting)   Pulse 109   Ht 5' 8.47" (1.739 m)   Wt 55 kg (121 lb 5.8 oz)   BMI 18.20 kg/m²   Wt Readings from Last 3 Encounters:   07/26/22 55 kg (121 lb 5.8 oz) (10 %, Z= -1.26)*   07/25/22 56.2 kg (123 lb 14.4 oz) (13 %, Z= -1.11)*   07/19/22 56.8 kg (125 lb 3.5 oz) (15 %, Z= -1.03)*     * Growth percentiles are based on CDC (Boys, 2-20 Years) data.     Ht Readings from Last 3 Encounters:   07/26/22 5' 8.47" (1.739 m) (39 %, Z= -0.28)*   07/05/22 5' 8.5" (1.74 m) (40 %, Z= -0.26)*   06/18/22 5' 7.52" (1.715 m) (28 %, Z= -0.60)*     * Growth percentiles are based on CDC (Boys, 2-20 Years) data.     Body mass index is 18.2 kg/m².  6 %ile (Z= -1.54) based on CDC (Boys, 2-20 Years) BMI-for-age based on BMI available as of 7/26/2022.  10 %ile (Z= -1.26) based on CDC (Boys, 2-20 Years) weight-for-age data using vitals from 7/26/2022.  39 %ile (Z= -0.28) based on [facility] (Boys, 2-20 Years) Stature-for-age data based on Stature recorded on 7/26/2022.      Physical Examination:  Constitutional: Appears well-developed. Non-toxic.   HENT:   Nose: Nose normal.   Mouth/Throat: Mucous membranes are moist. No oral lesions. No thrush. No tonsillar hypertrophy.   Eyes: Conjunctivae and EOM are normal.   Neck: Neck supple.  jugular venous distension noted with prominent jugular venous pulsations.  Cardiovascular:  Tachycardic, regular rhythm, S1 normal and split S2  no murmurs .   prominent S4 gallop.  2+ peripheral pulses.  Pulmonary/Chest: " Effort normal and air entry normal bilaterally.  No respiratory distress.   Well healed median sternotomy and chest tube sites.  The left thoracotomy site is well-healed.  Breath sounds are clear throughout.  No wheezes or rales.  Abdominal: Soft. Bowel sounds are normal.  very mild abdominal distention, possible mild ascites. Liver is not enlarged. There is no tenderness.   Neurological: Alert. Exhibits normal muscle tone.   Skin: Skin is warm and dry. Capillary refill takes less than 2 seconds. Turgor is normal. No cyanosis.   Extremities:  Left leg: No significant tenderness, edema, or deformity.  There is no erythema or warmth.  In the right leg incisions are completely healed. Right calf smaller than left. No tenderness or significant erythema. There is no increased warmth.  Excellent distal pulses are noted.  There is no edema in the feet.  Extensive scarring on the right calf noted.  No evidence of infection.     I personally reviewed and interpreted the following studies and tests:  EKG  Vent. Rate : 118 BPM     Atrial Rate : 118 BPM      P-R Int : 152 ms          QRS Dur : 138 ms       QT Int : 380 ms       P-R-T Axes : 100 195 047 degrees      QTc Int : 532 ms      Suspect arm lead reversal, interpretation assumes no reversal   Sinus tachycardia   Right bundle branch block   RVH     Most recent echocardiogram June 14, 2022:  Infradiaphragmatic TAPVR s/p repair with patent vertical vein and chronic dilated cardiomyopathy with severely depressed biventricular systolic function. - s/p orthotopic heart transplant with a biatrial anastomosis and ligation of the vertical vein at the diaphragm (2/3/19). - s/p severe cellular rejection with hemodynamic compromise needing ECMO (9/21-9/30/2020). IVC dilated There are multiple jets of tricuspid valve regurgitation, mild to moderate Moderate left atrial enlargement. Moderate right atrial enlargement. Right ventricle systolic pressure estimate normal. Right ventricle  is mildly hypertrophied. Moderately decreased right ventricular systolic function. Septal hypokinesis with fair posterior wall contractility. Overall mild to moderately reduced left ventricular systolic function with an ejection fraction modified biplane of 44%. Abormal global longitudinal strain of -8.5% Moderate right pleural effusion.      CPET March 2022:  VO2 max 15  Increased PVCs during and after study        Lab Results   Component Value Date    WBC 3.26 (L) 07/19/2022    HGB 9.1 (L) 07/19/2022    HCT 31.0 (L) 07/19/2022    MCV 68 (L) 07/19/2022     07/19/2022       CMP  Sodium   Date Value Ref Range Status   07/19/2022 136 136 - 145 mmol/L Final     Potassium   Date Value Ref Range Status   07/19/2022 4.4 3.5 - 5.1 mmol/L Final     Chloride   Date Value Ref Range Status   07/19/2022 106 95 - 110 mmol/L Final     CO2   Date Value Ref Range Status   07/19/2022 23 23 - 29 mmol/L Final     Glucose   Date Value Ref Range Status   07/19/2022 92 70 - 110 mg/dL Final     BUN   Date Value Ref Range Status   07/19/2022 11 5 - 18 mg/dL Final     Creatinine   Date Value Ref Range Status   07/19/2022 0.8 0.5 - 1.4 mg/dL Final     Calcium   Date Value Ref Range Status   07/19/2022 9.5 8.7 - 10.5 mg/dL Final     Total Protein   Date Value Ref Range Status   07/19/2022 7.0 6.0 - 8.4 g/dL Final     Albumin   Date Value Ref Range Status   07/19/2022 3.8 3.2 - 4.7 g/dL Final     Total Bilirubin   Date Value Ref Range Status   07/19/2022 0.8 0.1 - 1.0 mg/dL Final     Comment:     For infants and newborns, interpretation of results should be based  on gestational age, weight and in agreement with clinical  observations.    Premature Infant recommended reference ranges:  Up to 24 hours.............<8.0 mg/dL  Up to 48 hours............<12.0 mg/dL  3-5 days..................<15.0 mg/dL  6-29 days.................<15.0 mg/dL       Alkaline Phosphatase   Date Value Ref Range Status   07/19/2022 152 59 - 164 U/L Final     AST    Date Value Ref Range Status   07/19/2022 26 10 - 40 U/L Final     ALT   Date Value Ref Range Status   07/19/2022 7 (L) 10 - 44 U/L Final     Anion Gap   Date Value Ref Range Status   07/19/2022 7 (L) 8 - 16 mmol/L Final     eGFR if    Date Value Ref Range Status   07/19/2022 SEE COMMENT >60 mL/min/1.73 m^2 Final     eGFR if non    Date Value Ref Range Status   07/19/2022 SEE COMMENT >60 mL/min/1.73 m^2 Final     Comment:     Calculation used to obtain the estimated glomerular filtration  rate (eGFR) is the CKD-EPI equation.   Test not performed.  GFR calculation is only valid for patients   18 and older.       Lab Results   Component Value Date    CHOL 157 06/18/2022    CHOL 148 05/18/2021    CHOL 194 04/21/2020     Lab Results   Component Value Date    HDL 33 (L) 06/18/2022    HDL 46 05/18/2021    HDL 51 04/21/2020     Lab Results   Component Value Date    LDLCALC 92.4 06/18/2022    LDLCALC 78.4 05/18/2021    LDLCALC 111.2 04/21/2020     Lab Results   Component Value Date    TRIG 158 (H) 06/18/2022    TRIG 118 05/18/2021    TRIG 159 (H) 04/21/2020     Lab Results   Component Value Date    CHOLHDL 21.0 06/18/2022    CHOLHDL 31.1 05/18/2021    CHOLHDL 26.3 04/21/2020     Tacrolimus Lvl   Date Value Ref Range Status   07/05/2022 4.4 (L) 5.0 - 15.0 ng/mL Final     Comment:     Testing performed by a chemiluminescent microparticle   immunoassay on the [employer] s.r.l. System.       MPA   Date Value Ref Range Status   06/24/2022 2.7 1.0 - 3.5 mcg/mL Final     Magnesium   Date Value Ref Range Status   07/19/2022 1.8 1.6 - 2.6 mg/dL Final     EBV DNA, PCR   Date Value Ref Range Status   06/13/2022 Undetected Undetected IU/mL Final     Comment:     Result in log IU/mL is Undetected.    -------------------ADDITIONAL INFORMATION-------------------  The quantification range of this assay is 35 to 100,000,000   IU/mL (1.54 log to 8.00 log IU/mL). Testing was performed   using the toney EBV test  ([employer]   with the toney 6800 System.    Test Performed by:  [facility]  [address], [place] [address]  : Santos Mcfadden M.D. Ph.D.; CLIA# [id number]       Cytomegalovirus DNA   Date Value Ref Range Status   06/17/2022 Not Detected Not Detected Final     Class I Antibody Comments - Luminex   Date Value Ref Range Status   06/17/2022 B76(2522)  Final     Comment:     These tests are not cleared or approved by the U.S. FDA, but such   approval is not required since this laboratory is certified by CLIA   (#[id number]) and the American Society for Histocompatibility and   Immunogenetics (71-9-XM-02-01) to perform high complexity testing.    [facility]   Laboratory is under the direction of SHERI Jones MD, DILLON.   Details of test procedures may be obtained by calling the Laboratory   at  [phone].  Test performed using immunofluorescent detection - Luminex. Class I   and class II beads have been EDTA treated. This test was developed,   and its performance characteristics determined by the [facility].       Class II Antibody Comments - Luminex   Date Value Ref Range Status   06/17/2022 WEAK DQ5(2122), DRB5*01:01(1609)   Final     Comment:     These tests are not cleared or approved by the U.S. FDA, but such   approval is not required since this laboratory is certified by CLIA   (#[id number]) and the American Society for Histocompatibility and   Immunogenetics (69-2-LG-02-01) to perform high complexity testing.    [facility]   Laboratory is under the direction of SHERI Jones MD, DILLON.   Details of test procedures may be obtained by calling the Laboratory   at  [phone].  These tests are not cleared or approved by the U.S. FDA, but such   approval is not  required since this laboratory is certified by CLIA   (#[id number]) and the American Society for Histocompatibility and   Immunogenetics (80-2-MC-02-01) to perform high complexity testing.    [facility]   Laboratory is under the direction of SHERI Jones MD, DILLON.   Details of test procedures may be obtained by calling the Laboratory   at  [phone].  Test performed using immunofluorescent detection - Luminex. Class I   and class II beads have been EDTA treated. This test was developed,   and its performance characteristics determined by the [facility].       No results found for: SIROLIMUS        Assessment and Plan:   James Helm is a 15 y.o. male with:  1.  History of TAPVR s/p repair as a baby  2.  Orthotopic heart transplant on February 3, 2019 due to dilated cardiomyopathy  3.  Post transplant diabetes mellitus  4.  Acute systolic heart failure, severe cell mediated rejection, grade 3R (9/22/20) with hemodynamic compromise, repeat biopsy negative (10/6/20).   - V-A ECMO 9/23 (right foot perfusion catheter)  - LV vent 9/24, removed 9/27  - s/p ECMO decannulation (9/30)  5. AMR on cath 5/19/21 on steroid course. Repeat biopsy on 7/1/21, negative for rejection.  Biopsy negative rejection 10/24/21- treated with steroids.  Repeat Biopsy 2/23/22 negative for rejection.  6. Severe small vessel coronary disease noted on cath 11/30/21.  - chronic systolic and diastolic heart failure  - weight up several kg since discharge, clinical evidence of mild ascites with patient currently off diuretics  7. BULL with increased BUN and creat that improved on ECMO, recurrent post ECMO s/p CRRT, resolved   - currently with good renal function  8. History of atrial tachycardia, treated in hospital June 2022 with amiodarone load.  Now on maintenance dose.  9. Compartment syndrome of right lower leg- s/p fasciotomy  10/3, closure 10/9  - Abscess in right calf prompting hospitalization January 4th through January 15, 2021.  Drain placed January 6, 2021 through January 22, 2021.  On IV antibiotics until January 29, 2021.    - Incision and Drainage of R calf on 2/2/21, wound vac application with subsequent changes. Was on IV antibiotics until 3/16/21.   - Persistent right foot pain  10. S/p bedside wound debridement and wound vac placement to left thoracotomy site (10/11/20) - pseudomonas.  Resolved.   11. Peripheral neuropathy per PMR (secondary to tacrolimus)  12. Moderate to severely reduced VO2 max  13. Abnormal spirometry     He is now we listed as a status 1 B due to his severe distal coronary disease and repeated heart failure admissions. He remains a suitable candidate and is on Milrinone at 0.5mcg/kg/min.      Plan:    Immunosuppression:  - Continue Sirolimus.  Level is undetectable, confirmed compliance. Will go up to 5mg. Interestingly it seems that when he gets a level checked in [place] it is normal but in our lab recently it has been low.   - Tacrolimus.  Level was low today, his mother realized that she had been filling his med box with 3mg instead of 4mg, will got to 4mg today.  Dose was increased on July 1, 2022.   - [id number] mg PO BID, goal 2-4.   - DSA negative May 14, 2022    Combined systolic and diastolic heart failure:   - now on continuous Milrinone (0.5mcg/kg/min).  Continue off Entresto.   - Continue Lasix 40mg daily   - On Aldactone    Ectopic atrial tachycardia   - continue low-dose amiodarone     Graft surveillance/follow up:    Biopsy negative June 14, 2022. Needs cardiac cath Q6 months to look for coronary progression   Follow up in 1 month with ECG, echo, and labs  Recheck labs in 2 weeks.     CAV PPX  Pravastatin 20mg daily  ASA daily     FENGI:  Mg Goal >1.2, off magnesium  He has reflux that seems improved.     ENDO:  Close follow-up with endocrinology. He has been off insulin with sugars in goal  range.     Neuro/psych:      - continue current medications  - Adjustment disorder with depressed mood, SSRI started for chronic pain  - Dr. Ayala following    Pulm:  - Abnormal spirometry    Musc:      - PM&R following    Heme/ID:  - pretransplant CMV and EBV positive  - CMV and EBV PCR negative June 13, 2022. Patient CMV IgG positive, EBV negative.    - Bactrim held - pentamadine given after 1st transplant   - Left thoracotomy incision with drainage in the past- pseudomonas - treated with cefipime.  Resolved.    Derm:   Multiple warts - followed by Dermatology.     - Yearly derm done, multiple warts removed (11/9/21)  - Apply sunscreen to exposed areas every day     Genetics:  Cardiomyopathy panel with variant of unknown significance.  Family aware that the recommendation is that both parents and the kids echos.     Activity:  Scuba Diving restrictions due to denervated heart and pressure changes.       Dentist:  He saw his dentist this year.        Sincerely,        Ventura Armenta MD  Pediatric Cardiologist  Director of Pediatric Heart Transplant and Heart Failure  [facility]  [address] [place] [address]    Office

## 2022-07-29 ENCOUNTER — NON-APPOINTMENT (OUTPATIENT)
Age: 62
End: 2022-07-29

## 2022-08-01 ENCOUNTER — RX RENEWAL (OUTPATIENT)
Age: 62
End: 2022-08-01

## 2022-08-01 NOTE — PROGRESS NOTE ADULT - PROBLEM SELECTOR PROBLEM 5
History and physical documented and up to date, allergies reviewed, lab results reviewed, pre-procedure education provided, patient verbalized understanding of procedure, procedural consent signed and patient is NPO. Acute pain of left foot

## 2022-08-03 ENCOUNTER — RX RENEWAL (OUTPATIENT)
Age: 62
End: 2022-08-03

## 2022-08-04 NOTE — OCCUPATIONAL THERAPY INITIAL EVALUATION ADULT - ORIENTATION, REHAB EVAL
Continuity of Care Document

                            Created on:2022



Patient:DAVID SAAVEDRA

Sex:Female

:1957

External Reference #:[id number]





Demographics







                          Address                   [id number]



                                                    [place] [address]

 

                          Home Phone                [phone]

 

                          Email Address             [address]

 

                          Preferred Language        gme

 

                          Marital Status            Unknown

 

                          Jain Affiliation     Unknown

 

                          Race                      Unknown

 

                          Ethnic Group              Unknown









Author







                          Organization              [facility]

t

 

                          Address                   [address]



                                                    [place] [address]

 

                          Phone                     [phone]









Care Team Providers







                    Name                Role                Phone

 

                    DR AUSTIN ABDALLA    Attending Clinician Unavailable

 

                    DR AUSTIN ABDALLA    Admitting Clinician Unavailable









Problems

This patient has no known problems.



Allergies, Adverse Reactions, Alerts

This patient has no known allergies or adverse reactions.



Medications

This patient has no known medications.



Procedures

This patient has no known procedures.



Encounters







        Start   End     Encounter Admission Attending Care    Care    Encounter 

Source



        Date/Time Date/Time Type    Type    Clinicians Facility Department ID   

   

 

        2017         Inpatient C       GRANT ABDALLA     [facility]    [id number] 

[facility]



        07:00:00                         [facility]







Results

This patient has no known results. oriented to person, place, time and situation

## 2022-08-05 ENCOUNTER — NON-APPOINTMENT (OUTPATIENT)
Age: 62
End: 2022-08-05

## 2022-08-05 ENCOUNTER — RX RENEWAL (OUTPATIENT)
Age: 62
End: 2022-08-05

## 2022-08-12 ENCOUNTER — NON-APPOINTMENT (OUTPATIENT)
Age: 62
End: 2022-08-12

## 2022-08-12 ENCOUNTER — RX RENEWAL (OUTPATIENT)
Age: 62
End: 2022-08-12

## 2022-08-15 ENCOUNTER — NON-APPOINTMENT (OUTPATIENT)
Age: 62
End: 2022-08-15

## 2022-08-19 RX ORDER — EPOETIN ALFA-EPBX 40000 [IU]/ML
40000 INJECTION, SOLUTION INTRAVENOUS; SUBCUTANEOUS
Qty: 4 | Refills: 0 | Status: ACTIVE | COMMUNITY
Start: 2021-06-15 | End: 1900-01-01

## 2022-08-24 ENCOUNTER — OUTPATIENT (OUTPATIENT)
Dept: OUTPATIENT SERVICES | Facility: HOSPITAL | Age: 62
LOS: 1 days | Discharge: HOME | End: 2022-08-24

## 2022-08-24 DIAGNOSIS — R79.9 ABNORMAL FINDING OF BLOOD CHEMISTRY, UNSPECIFIED: ICD-10-CM

## 2022-08-26 ENCOUNTER — NON-APPOINTMENT (OUTPATIENT)
Age: 62
End: 2022-08-26

## 2022-08-29 ENCOUNTER — APPOINTMENT (OUTPATIENT)
Dept: HEMATOLOGY ONCOLOGY | Facility: CLINIC | Age: 62
End: 2022-08-29

## 2022-08-29 PROCEDURE — 99213 OFFICE O/P EST LOW 20 MIN: CPT | Mod: 95

## 2022-08-31 ENCOUNTER — RX RENEWAL (OUTPATIENT)
Age: 62
End: 2022-08-31

## 2022-09-01 ENCOUNTER — NON-APPOINTMENT (OUTPATIENT)
Age: 62
End: 2022-09-01

## 2022-09-03 NOTE — ASSESSMENT
[FreeTextEntry1] : This is a 62 year old woman with a history of COPD, COVID 19 infection March 2021, since then patient has een even more debilitated and Home oxygen requiring, unable to leave the house. Patient was in the ER for transfusions in the past. Hg as low as 7.4g/dl.  Anemia of chronic disease and inflammation superimposed on anemia of chronic renal insufficiency.  Patien was started with retacrit 40,000 units with weekly home CBC check.  Goal to < 10.5g/dl to mitigate most of the excess risk of CVA and heart disease.  Patient had a very successful treatment course according to lab parameters, patient received injection about every 2 weeks, however patient ha not improved much clinically. Discussed this with patient's family.  After some discussion we elected to continue current plan as it is in fact helping with the hemoglobin.

## 2022-09-03 NOTE — HISTORY OF PRESENT ILLNESS
[de-identified] : This is a 62 year old woman with a history of COPD, severe COVID in March 2021 since then has recovered somewhat, but remains on BIPAP, and is on 4-5 liters of oxygen constantly.  Patient severely debilitated and mostly home bound.    Uses [facility] to draw blood every 2-3 weeks.  Hg down to 7.4g/dl was sent to hospital at [facility] and had blood transfusion. Had a GI workup, but was unable to do colonoscopy, no bleeding, but did have a guaiac positive.   [de-identified] : Patient has 2 weeks worth of retacrit left.  Has been using it for Hg < 10 g/dl.  which for the most part patient has not needed an injection every time, ahs been using it about 2 times a month.  Patient has not imrpoved much functionally however, the higher Hg lately has not made a difference to performance status and patient still feels weak.  \par [place] needed additional information.  \par \par Used 2 in the past month, but prior to that had not need an injection in 7 months.  Initially was using it every week in the beginning last June.\par  < 10g/dl.

## 2022-09-09 ENCOUNTER — NON-APPOINTMENT (OUTPATIENT)
Age: 62
End: 2022-09-09

## 2022-09-15 ENCOUNTER — NON-APPOINTMENT (OUTPATIENT)
Age: 62
End: 2022-09-15

## 2022-09-30 ENCOUNTER — NON-APPOINTMENT (OUTPATIENT)
Age: 62
End: 2022-09-30

## 2022-10-05 ENCOUNTER — NON-APPOINTMENT (OUTPATIENT)
Age: 62
End: 2022-10-05

## 2022-10-06 ENCOUNTER — RX RENEWAL (OUTPATIENT)
Age: 62
End: 2022-10-06

## 2022-10-12 ENCOUNTER — NON-APPOINTMENT (OUTPATIENT)
Age: 62
End: 2022-10-12

## 2022-10-19 ENCOUNTER — NON-APPOINTMENT (OUTPATIENT)
Age: 62
End: 2022-10-19

## 2022-10-28 ENCOUNTER — RX RENEWAL (OUTPATIENT)
Age: 62
End: 2022-10-28

## 2022-11-01 ENCOUNTER — RX RENEWAL (OUTPATIENT)
Age: 62
End: 2022-11-01

## 2022-11-06 ENCOUNTER — RX RENEWAL (OUTPATIENT)
Age: 62
End: 2022-11-06

## 2022-11-09 ENCOUNTER — NON-APPOINTMENT (OUTPATIENT)
Age: 62
End: 2022-11-09

## 2022-11-09 DIAGNOSIS — R05.9 COUGH, UNSPECIFIED: ICD-10-CM

## 2022-11-09 RX ORDER — AMOXICILLIN AND CLAVULANATE POTASSIUM 875; 125 MG/1; MG/1
875-125 TABLET, COATED ORAL TWICE DAILY
Qty: 20 | Refills: 0 | Status: COMPLETED | COMMUNITY
Start: 2022-11-09 | End: 2022-11-19

## 2022-11-14 ENCOUNTER — RX RENEWAL (OUTPATIENT)
Age: 62
End: 2022-11-14

## 2022-11-16 ENCOUNTER — NON-APPOINTMENT (OUTPATIENT)
Age: 62
End: 2022-11-16

## 2022-11-22 ENCOUNTER — NON-APPOINTMENT (OUTPATIENT)
Age: 62
End: 2022-11-22

## 2022-11-30 ENCOUNTER — NON-APPOINTMENT (OUTPATIENT)
Age: 62
End: 2022-11-30

## 2022-12-06 ENCOUNTER — NON-APPOINTMENT (OUTPATIENT)
Age: 62
End: 2022-12-06

## 2022-12-13 ENCOUNTER — NON-APPOINTMENT (OUTPATIENT)
Age: 62
End: 2022-12-13

## 2022-12-14 NOTE — HISTORY OF PRESENT ILLNESS
Detail Level: Zone Initiate Treatment: Hypoallergenic/fragrance free products Discontinue Regimen: Anti-aging night cream Otc Regimen: Hydrocortisone otc sparingly, Vaseline [Home] : at home, [unfilled] , at the time of the visit. Plan: Switch back to her regular toothpaste since symptoms only began on travel last month while using sample toothpaste. [Medical Office: ([facility])___] : at the medical office located in  [Verbal consent obtained from patient] : the patient, [unfilled] [Family Member] : family member [FreeTextEntry1] : F/U medical/pulmonary visit. [de-identified] : Remains fatigued.\par Has labored breathing.\par Her oxygen saturations improved after switch to oxygen via tank/cylinder.\par Eating a bit better.\par Brother wants to review meds again.\par Patient requesting xanax for anxiety.\par Also needs other RX renewals.\par Needs labs done.\par Interested in home care program.

## 2022-12-15 NOTE — PROGRESS NOTE ADULT - SUBJECTIVE AND OBJECTIVE BOX
You may receive a survey regarding the care you received during your visit. Your input is valuable to us. We encourage you to complete and return your survey. We hope you will choose us in the future for your healthcare needs. Patient is a 60y old  Female who presents with a chief complaint of 60F p/w generalized weakness and difficulty walking (04 Aug 2020 12:38)    SUBJECTIVE / OVERNIGHT EVENTS: no acute events overnight, felt dyspneic this morning and now has BiPAP back on     MEDICATIONS  (STANDING):  albuterol/ipratropium for Nebulization 3 milliLiter(s) Nebulizer every 6 hours  apixaban 5 milliGRAM(s) Oral every 12 hours  aspirin enteric coated 81 milliGRAM(s) Oral daily  atorvastatin 80 milliGRAM(s) Oral at bedtime  budesonide 160 MICROgram(s)/formoterol 4.5 MICROgram(s) Inhaler 2 Puff(s) Inhalation two times a day  cholecalciferol 2000 Unit(s) Oral daily  dextrose 5%. 1000 milliLiter(s) (50 mL/Hr) IV Continuous <Continuous>  dextrose 50% Injectable 12.5 Gram(s) IV Push once  dextrose 50% Injectable 25 Gram(s) IV Push once  dextrose 50% Injectable 25 Gram(s) IV Push once  diltiazem    milliGRAM(s) Oral daily  insulin glargine Injectable (LANTUS) 10 Unit(s) SubCutaneous at bedtime  insulin lispro (HumaLOG) corrective regimen sliding scale   SubCutaneous three times a day before meals  insulin lispro (HumaLOG) corrective regimen sliding scale   SubCutaneous at bedtime  insulin lispro Injectable (HumaLOG) 3 Unit(s) SubCutaneous three times a day before meals  montelukast 10 milliGRAM(s) Oral daily  multivitamin 1 Tablet(s) Oral daily  pantoprazole    Tablet 40 milliGRAM(s) Oral before breakfast  predniSONE   Tablet 40 milliGRAM(s) Oral daily  theophylline ER (24 Hour) 400 milliGRAM(s) Oral daily  tiotropium 18 MICROgram(s) Capsule 1 Capsule(s) Inhalation daily    MEDICATIONS  (PRN):  dextrose 40% Gel 15 Gram(s) Oral once PRN Blood Glucose LESS THAN 70 milliGRAM(s)/deciliter  glucagon  Injectable 1 milliGRAM(s) IntraMuscular once PRN Glucose LESS THAN 70 milligrams/deciliter      Vital Signs Last 24 Hrs  T(C): 36.7 (05 Aug 2020 07:36), Max: 36.7 (05 Aug 2020 07:36)  T(F): 98 (05 Aug 2020 07:36), Max: 98 (05 Aug 2020 07:36)  HR: 102 (05 Aug 2020 09:00) (69 - 102)  BP: 130/62 (05 Aug 2020 09:00) (130/62 - 143/70)  BP(mean): --  RR: 20 (05 Aug 2020 09:00) (18 - 20)  SpO2: 100% (05 Aug 2020 09:00) (96% - 100%)  CAPILLARY BLOOD GLUCOSE      POCT Blood Glucose.: 241 mg/dL (05 Aug 2020 08:40)  POCT Blood Glucose.: 296 mg/dL (04 Aug 2020 21:13)  POCT Blood Glucose.: 338 mg/dL (04 Aug 2020 17:35)  POCT Blood Glucose.: 390 mg/dL (04 Aug 2020 14:49)  POCT Blood Glucose.: 431 mg/dL (04 Aug 2020 13:44)  POCT Blood Glucose.: 428 mg/dL (04 Aug 2020 13:43)    I&O's Summary    04 Aug 2020 07:01  -  05 Aug 2020 07:00  --------------------------------------------------------  IN: 870 mL / OUT: 2 mL / NET: 868 mL        PHYSICAL EXAM:  GENERAL: NAD  EYES: conjunctiva and sclera clear  NECK: Supple, No JVD  CHEST/LUNG: Clear to auscultation bilaterally; No wheeze  HEART: +s1/S2, reg   ABDOMEN: Soft, Nontender, Nondistended; Bowel sounds present  EXTREMITIES:  no LE edema   PSYCH: AAOx3      LABS:                        9.3    9.04  )-----------( 278      ( 05 Aug 2020 09:20 )             31.1     08-05    138  |  87<L>  |  32<H>  ----------------------------<  214<H>  4.8   |  39<H>  |  0.97    Ca    9.5      05 Aug 2020 09:20

## 2023-01-01 ENCOUNTER — NON-APPOINTMENT (OUTPATIENT)
Age: 63
End: 2023-01-01

## 2023-01-01 ENCOUNTER — RX RENEWAL (OUTPATIENT)
Age: 63
End: 2023-01-01

## 2023-01-01 ENCOUNTER — LABORATORY RESULT (OUTPATIENT)
Age: 63
End: 2023-01-01

## 2023-01-01 ENCOUNTER — APPOINTMENT (OUTPATIENT)
Dept: INTERNAL MEDICINE | Facility: CLINIC | Age: 63
End: 2023-01-01
Payer: COMMERCIAL

## 2023-01-01 ENCOUNTER — APPOINTMENT (OUTPATIENT)
Dept: MAMMOGRAPHY | Facility: IMAGING CENTER | Age: 63
End: 2023-01-01
Payer: COMMERCIAL

## 2023-01-01 ENCOUNTER — RX CHANGE (OUTPATIENT)
Age: 63
End: 2023-01-01

## 2023-01-01 ENCOUNTER — APPOINTMENT (OUTPATIENT)
Dept: INTERNAL MEDICINE | Facility: CLINIC | Age: 63
End: 2023-01-01

## 2023-01-01 ENCOUNTER — RESULT REVIEW (OUTPATIENT)
Age: 63
End: 2023-01-01

## 2023-01-01 ENCOUNTER — OUTPATIENT (OUTPATIENT)
Dept: OUTPATIENT SERVICES | Facility: HOSPITAL | Age: 63
LOS: 1 days | End: 2023-01-01
Payer: COMMERCIAL

## 2023-01-01 ENCOUNTER — APPOINTMENT (OUTPATIENT)
Dept: ULTRASOUND IMAGING | Facility: IMAGING CENTER | Age: 63
End: 2023-01-01
Payer: COMMERCIAL

## 2023-01-01 ENCOUNTER — APPOINTMENT (OUTPATIENT)
Dept: RADIOLOGY | Facility: IMAGING CENTER | Age: 63
End: 2023-01-01
Payer: COMMERCIAL

## 2023-01-01 ENCOUNTER — TRANSCRIPTION ENCOUNTER (OUTPATIENT)
Age: 63
End: 2023-01-01

## 2023-01-01 DIAGNOSIS — J43.9 EMPHYSEMA, UNSPECIFIED: ICD-10-CM

## 2023-01-01 DIAGNOSIS — R60.0 LOCALIZED EDEMA: ICD-10-CM

## 2023-01-01 DIAGNOSIS — E11.9 TYPE 2 DIABETES MELLITUS W/OUT COMPLICATIONS: ICD-10-CM

## 2023-01-01 DIAGNOSIS — R92.2 INCONCLUSIVE MAMMOGRAM: ICD-10-CM

## 2023-01-01 DIAGNOSIS — K59.09 OTHER CONSTIPATION: ICD-10-CM

## 2023-01-01 DIAGNOSIS — Z12.39 ENCOUNTER FOR OTHER SCREENING FOR MALIGNANT NEOPLASM OF BREAST: ICD-10-CM

## 2023-01-01 DIAGNOSIS — M79.89 OTHER SPECIFIED SOFT TISSUE DISORDERS: ICD-10-CM

## 2023-01-01 DIAGNOSIS — L30.9 DERMATITIS, UNSPECIFIED: ICD-10-CM

## 2023-01-01 DIAGNOSIS — N18.9 CHRONIC KIDNEY DISEASE, UNSPECIFIED: ICD-10-CM

## 2023-01-01 DIAGNOSIS — D64.9 ANEMIA, UNSPECIFIED: ICD-10-CM

## 2023-01-01 DIAGNOSIS — Z00.8 ENCOUNTER FOR OTHER GENERAL EXAMINATION: ICD-10-CM

## 2023-01-01 DIAGNOSIS — R92.8 OTHER ABNORMAL AND INCONCLUSIVE FINDINGS ON DIAGNOSTIC IMAGING OF BREAST: ICD-10-CM

## 2023-01-01 PROCEDURE — 93971 EXTREMITY STUDY: CPT | Mod: 26,LT,59

## 2023-01-01 PROCEDURE — 77063 BREAST TOMOSYNTHESIS BI: CPT | Mod: 26

## 2023-01-01 PROCEDURE — 93970 EXTREMITY STUDY: CPT | Mod: 26

## 2023-01-01 PROCEDURE — 93970 EXTREMITY STUDY: CPT

## 2023-01-01 PROCEDURE — 77067 SCR MAMMO BI INCL CAD: CPT | Mod: 26

## 2023-01-01 PROCEDURE — 76641 ULTRASOUND BREAST COMPLETE: CPT | Mod: 26,50

## 2023-01-01 PROCEDURE — 99214 OFFICE O/P EST MOD 30 MIN: CPT | Mod: 95

## 2023-01-01 PROCEDURE — 93971 EXTREMITY STUDY: CPT

## 2023-01-01 PROCEDURE — 71046 X-RAY EXAM CHEST 2 VIEWS: CPT | Mod: 26

## 2023-01-01 PROCEDURE — 71046 X-RAY EXAM CHEST 2 VIEWS: CPT

## 2023-01-01 PROCEDURE — 99213 OFFICE O/P EST LOW 20 MIN: CPT | Mod: 95

## 2023-01-01 PROCEDURE — 77063 BREAST TOMOSYNTHESIS BI: CPT

## 2023-01-01 PROCEDURE — 93971 EXTREMITY STUDY: CPT | Mod: 26,LT

## 2023-01-01 PROCEDURE — 77067 SCR MAMMO BI INCL CAD: CPT

## 2023-01-01 PROCEDURE — 99441: CPT

## 2023-01-01 PROCEDURE — 76641 ULTRASOUND BREAST COMPLETE: CPT

## 2023-01-01 RX ORDER — CEFUROXIME AXETIL 500 MG/1
500 TABLET ORAL
Qty: 20 | Refills: 0 | Status: DISCONTINUED | COMMUNITY
Start: 2022-04-05 | End: 2023-01-01

## 2023-01-01 RX ORDER — SITAGLIPTIN 25 MG/1
25 TABLET, FILM COATED ORAL
Qty: 90 | Refills: 1 | Status: ACTIVE | COMMUNITY
Start: 2021-08-26 | End: 1900-01-01

## 2023-01-01 RX ORDER — CEPHALEXIN 500 MG/1
500 CAPSULE ORAL 3 TIMES DAILY
Qty: 30 | Refills: 0 | Status: DISCONTINUED | COMMUNITY
Start: 2022-06-29 | End: 2023-01-01

## 2023-01-01 RX ORDER — APIXABAN 5 MG/1
5 TABLET, FILM COATED ORAL
Qty: 180 | Refills: 0 | Status: ACTIVE | COMMUNITY
Start: 2019-02-13 | End: 1900-01-01

## 2023-01-01 RX ORDER — PRASUGREL 5 MG/1
5 TABLET, FILM COATED ORAL DAILY
Qty: 90 | Refills: 0 | Status: ACTIVE | COMMUNITY
Start: 2021-04-15 | End: 1900-01-01

## 2023-01-01 RX ORDER — TIRZEPATIDE 2.5 MG/.5ML
2.5 INJECTION, SOLUTION SUBCUTANEOUS
Qty: 2 | Refills: 0 | Status: ACTIVE | COMMUNITY
Start: 2023-01-01 | End: 1900-01-01

## 2023-01-01 RX ORDER — IPRATROPIUM BROMIDE 0.5 MG/2.5ML
0.02 SOLUTION RESPIRATORY (INHALATION)
Qty: 1000 | Refills: 0 | Status: ACTIVE | COMMUNITY
Start: 2021-06-09 | End: 1900-01-01

## 2023-01-01 RX ORDER — DILTIAZEM HYDROCHLORIDE 300 MG/1
300 TABLET, EXTENDED RELEASE ORAL
Qty: 90 | Refills: 0 | Status: ACTIVE | COMMUNITY
Start: 2021-06-23 | End: 1900-01-01

## 2023-01-01 RX ORDER — BLOOD-GLUCOSE SENSOR
EACH MISCELLANEOUS
Qty: 3 | Refills: 6 | Status: ACTIVE | COMMUNITY
Start: 2023-01-01 | End: 1900-01-01

## 2023-01-01 RX ORDER — TIOTROPIUM BROMIDE 18 UG/1
18 CAPSULE ORAL; RESPIRATORY (INHALATION) DAILY
Qty: 90 | Refills: 0 | Status: ACTIVE | COMMUNITY
Start: 2019-02-13 | End: 1900-01-01

## 2023-01-01 RX ORDER — PREDNISONE 10 MG/1
10 TABLET ORAL DAILY
Qty: 90 | Refills: 0 | Status: DISCONTINUED | COMMUNITY
Start: 2021-02-16 | End: 2023-01-01

## 2023-01-01 RX ORDER — DOXYCYCLINE 100 MG/1
100 CAPSULE ORAL TWICE DAILY
Qty: 20 | Refills: 0 | Status: DISCONTINUED | COMMUNITY
Start: 2023-01-01 | End: 2023-01-01

## 2023-01-01 RX ORDER — BUDESONIDE AND FORMOTEROL FUMARATE DIHYDRATE 160; 4.5 UG/1; UG/1
160-4.5 AEROSOL RESPIRATORY (INHALATION) TWICE DAILY
Qty: 1 | Refills: 3 | Status: ACTIVE | COMMUNITY
Start: 2019-02-13 | End: 1900-01-01

## 2023-01-01 RX ORDER — SYRINGE, DISPOSABLE, 1 ML
27G X 1/2" SYRINGE, EMPTY DISPOSABLE MISCELLANEOUS
Qty: 1 | Refills: 0 | Status: ACTIVE | COMMUNITY
Start: 2023-01-01 | End: 1900-01-01

## 2023-01-01 RX ORDER — INHALER, ASSIST DEVICES
SPACER (EA) MISCELLANEOUS
Qty: 1 | Refills: 0 | Status: ACTIVE | COMMUNITY
Start: 2023-01-01 | End: 1900-01-01

## 2023-01-01 RX ORDER — PREDNISONE 10 MG/1
10 TABLET ORAL
Qty: 90 | Refills: 0 | Status: ACTIVE | COMMUNITY
Start: 2023-01-01 | End: 1900-01-01

## 2023-01-01 RX ORDER — CEFUROXIME AXETIL 500 MG/1
500 TABLET ORAL
Qty: 20 | Refills: 0 | Status: DISCONTINUED | COMMUNITY
Start: 2021-12-23 | End: 2023-01-01

## 2023-01-01 RX ORDER — GLIMEPIRIDE 2 MG/1
2 TABLET ORAL
Qty: 180 | Refills: 1 | Status: ACTIVE | COMMUNITY
Start: 2020-12-16 | End: 1900-01-01

## 2023-01-01 RX ORDER — LOSARTAN POTASSIUM 25 MG/1
25 TABLET, FILM COATED ORAL
Qty: 90 | Refills: 0 | Status: ACTIVE | COMMUNITY
Start: 2021-07-29 | End: 1900-01-01

## 2023-01-01 RX ORDER — ALBUTEROL SULFATE 90 UG/1
108 (90 BASE) INHALANT RESPIRATORY (INHALATION) EVERY 6 HOURS
Qty: 1 | Refills: 0 | Status: ACTIVE | COMMUNITY
Start: 2021-06-11 | End: 1900-01-01

## 2023-01-01 RX ORDER — FLUTICASONE PROPIONATE 50 UG/1
50 SPRAY, METERED NASAL
Qty: 16 | Refills: 2 | Status: ACTIVE | COMMUNITY
Start: 2022-06-01 | End: 1900-01-01

## 2023-01-01 RX ORDER — NYSTATIN 100000 1/G
100000 POWDER TOPICAL
Qty: 30 | Refills: 0 | Status: ACTIVE | COMMUNITY
Start: 2023-01-01 | End: 1900-01-01

## 2023-01-01 RX ORDER — PANTOPRAZOLE 40 MG/1
40 TABLET, DELAYED RELEASE ORAL
Qty: 90 | Refills: 0 | Status: ACTIVE | COMMUNITY
Start: 2019-02-13 | End: 1900-01-01

## 2023-01-01 RX ORDER — ROSUVASTATIN CALCIUM 20 MG/1
20 TABLET, FILM COATED ORAL
Qty: 90 | Refills: 0 | Status: ACTIVE | COMMUNITY
Start: 2019-06-05 | End: 1900-01-01

## 2023-01-01 RX ORDER — BLOOD SUGAR DIAGNOSTIC
STRIP MISCELLANEOUS
Qty: 100 | Refills: 3 | Status: ACTIVE | COMMUNITY
Start: 2020-11-30 | End: 1900-01-01

## 2023-01-01 RX ORDER — DOCUSATE SODIUM 100 MG/1
100 CAPSULE, LIQUID FILLED ORAL
Qty: 90 | Refills: 0 | Status: ACTIVE | COMMUNITY
Start: 2023-01-01 | End: 1900-01-01

## 2023-01-01 RX ORDER — AZITHROMYCIN 250 MG/1
250 TABLET, FILM COATED ORAL
Qty: 30 | Refills: 5 | Status: ACTIVE | COMMUNITY
Start: 2021-09-03 | End: 1900-01-01

## 2023-01-01 RX ORDER — FUROSEMIDE 40 MG/1
40 TABLET ORAL DAILY
Qty: 90 | Refills: 0 | Status: ACTIVE | COMMUNITY
Start: 2021-06-02 | End: 1900-01-01

## 2023-01-01 RX ORDER — BLOOD-GLUCOSE,RECEIVER,CONT
EACH MISCELLANEOUS
Qty: 1 | Refills: 0 | Status: ACTIVE | COMMUNITY
Start: 2023-01-01 | End: 1900-01-01

## 2023-01-01 RX ORDER — ESCITALOPRAM OXALATE 5 MG/1
5 TABLET ORAL
Qty: 90 | Refills: 1 | Status: ACTIVE | COMMUNITY
Start: 2021-04-15 | End: 1900-01-01

## 2023-01-25 PROBLEM — M79.89 LEFT ARM SWELLING: Status: ACTIVE | Noted: 2022-06-29

## 2023-01-25 NOTE — HISTORY OF PRESENT ILLNESS
[Home] : at home, [unfilled] , at the time of the visit. [Medical Office: ([facility])___] : at the medical office located in  [Family Member] : family member [Verbal consent obtained from patient] : the patient, [unfilled] [FreeTextEntry8] : Complains of left arm swelling for 1 to 2 days.\par Denies any tenderness of the arm.\par Denies any redness or increased warmth of the arm.\par Denies any coldness or blueness of the arm or fingers.\par Has not had recent blood draw or injection in the arm.\par Denies any trauma to the area.\par No specific joint pain noted.  Has free range of motion.\par Has not noticed any breast or axillary mass or issue.  Denies any fevers or chills.\par No history of gout.

## 2023-01-25 NOTE — PHYSICAL EXAM
[No Acute Distress] : no acute distress [Well Nourished] : well nourished [Well Developed] : well developed [Well-Appearing] : well-appearing [Normal Voice/Communication] : normal voice/communication [No Respiratory Distress] : no respiratory distress  [No Accessory Muscle Use] : no accessory muscle use [No Rash] : no rash [Speech Grossly Normal] : speech grossly normal [Normal Affect] : the affect was normal [Alert and Oriented x3] : oriented to person, place, and time [de-identified] : R = 20; no audible stridor or wheezing noted [de-identified] : Wearing oxygen via nasal cannula [de-identified] : Mild swelling of left upper extremity; no areas of erythema noted

## 2023-01-25 NOTE — ASSESSMENT
[FreeTextEntry1] : Swelling of the left arm\par ?  Etiology\par ?  DVT = no cord noted and patient on Eliquis\par ?  Breast or axillary lesion triggering lymphedema = patient has not palpated any lesion on exam\par ?  Cellulitis = no fevers and no areas of warmth or erythema noted on arm\par No history of trauma or injury\par ?  Related to immobility\par Doubt related to fluid overload\par ?  Rheumatologic = no clear joint swelling or pain\par \par Continue anticoagulants\par May need mammogram and left axillary ultrasound\par May need left upper extremity Doppler study\par Monitor fever curve\par Monitor arm for any signs of redness or warmth = if noticed to start antibiotic therapy\par Prescription for doxycycline 100 mg twice daily to cover MRSA sent to pharmacy\par Elevate left upper extremity\par Can try either applying ice or warm soaks to reduce swelling\par No need for increased diuretics presently\par Will need to check uric acid level\par May need trial of steroids\par Patient's brother will keep in close contact with me.  He will call if her status worsens or does not improve.\par He will call for any medical or pulmonary issues.\par All of the above was discussed in detail with them.  All of their questions were answered.  They verbally confirmed understanding of all of the above and agreement with the above plan.\par He knows to bring her to the emergency room for any severe issues.

## 2023-01-25 NOTE — REVIEW OF SYSTEMS
[Vision Problems] : vision problems [Postnasal Drip] : postnasal drip [Leg Claudication] : leg claudication [Shortness Of Breath] : shortness of breath [Dyspnea on Exertion] : dyspnea on exertion [Heartburn] : heartburn [Joint Pain] : joint pain [Muscle Weakness] : muscle weakness [Muscle Pain] : muscle pain [Back Pain] : back pain [Itching] : Itching [Nail Changes] : nail changes [Hair Changes] : hair changes [Skin Rash] : skin rash [Unsteady Walking] : ataxia [Anxiety] : anxiety [Easy Bruising] : easy bruising [Negative] : Heme/Lymph

## 2023-01-25 NOTE — HEALTH RISK ASSESSMENT
[Former] : Former [No] : In the past 12 months have you used drugs other than those required for medical reasons? No [No falls in past year] : Patient reported no falls in the past year [Patient refused screening] : Patient refused screening [de-identified] : None [de-identified] : Regular

## 2023-02-01 PROBLEM — L30.9 DERMATITIS: Status: ACTIVE | Noted: 2023-01-01

## 2023-02-08 PROBLEM — K59.09 CONSTIPATION, CHRONIC: Status: ACTIVE | Noted: 2023-01-01

## 2023-03-15 NOTE — ED CLERICAL - DIVISION
[facility]... [Post-Operative Visit] : a post-operative visit [FreeTextEntry2] : s/p ear lobe lac repair

## 2023-08-06 NOTE — CONSULT NOTE ADULT - SUBJECTIVE AND OBJECTIVE BOX
CHIEF COMPLAINT: Dyspnea on admission (now improved)    HISTORY OF PRESENT ILLNESS: Pt is a 59 yo female w h/o COPD on home O2, CAD (6 stents), DM2, PVD, prolonged hospital course due to acute on chronic hypoxic/hypercarbic respiratory failure and COPD exacerbation, who has now had episodes of tachycardia (A-fib w RVR and A-flutter/A-tach) since yesterday. Pt reports yesterday she felt palpitations and some mild chest discomfort but has not experienced any of those feelings today. She denies any prior history of arrhythmia. At this time she denies chest pain, palpitations, dyspnea.      Allergies    No Known Allergies    Intolerances    albuterol (Unknown)  	    MEDICATIONS:  amiodarone    Tablet 400 milliGRAM(s) Oral two times a day  apixaban 5 milliGRAM(s) Oral every 12 hours  aspirin enteric coated 81 milliGRAM(s) Oral daily  digoxin     Tablet 0.125 milliGRAM(s) Oral daily  furosemide    Tablet 40 milliGRAM(s) Oral daily  isosorbide   mononitrate ER Tablet (IMDUR) 30 milliGRAM(s) Oral daily  metoprolol tartrate Injectable 5 milliGRAM(s) IV Push every 6 hours    azithromycin   Tablet 250 milliGRAM(s) Oral <User Schedule>  nystatin    Suspension [id number] Unit(s) Oral four times a day    buDESOnide   0.5 milliGRAM(s) Respule 0.5 milliGRAM(s) Inhalation every 12 hours  ipratropium    for Nebulization 500 MICROGram(s) Nebulizer every 6 hours  levalbuterol Inhalation 0.63 milliGRAM(s) Inhalation every 6 hours  montelukast 10 milliGRAM(s) Oral daily  theophylline ER Capsule 400 milliGRAM(s) Oral daily    melatonin 1 milliGRAM(s) Oral at bedtime  ondansetron Injectable 4 milliGRAM(s) IV Push every 6 hours PRN    aluminum hydroxide/magnesium hydroxide/simethicone Suspension 30 milliLiter(s) Oral every 6 hours PRN  bisacodyl Suppository 10 milliGRAM(s) Rectal daily PRN  docusate sodium 100 milliGRAM(s) Oral three times a day  pantoprazole    Tablet 40 milliGRAM(s) Oral before breakfast  polyethylene glycol 3350 17 Gram(s) Oral daily  senna 2 Tablet(s) Oral at bedtime  simethicone 80 milliGRAM(s) Chew once    dextrose 40% Gel 15 Gram(s) Oral once PRN  dextrose 50% Injectable 12.5 Gram(s) IV Push once  dextrose 50% Injectable 25 Gram(s) IV Push once  dextrose 50% Injectable 25 Gram(s) IV Push once  glucagon  Injectable 1 milliGRAM(s) IntraMuscular once PRN  insulin glargine Injectable (LANTUS) 9 Unit(s) SubCutaneous at bedtime  insulin lispro (HumaLOG) corrective regimen sliding scale   SubCutaneous three times a day before meals  insulin lispro (HumaLOG) corrective regimen sliding scale   SubCutaneous at bedtime  insulin lispro Injectable (HumaLOG) 4 Unit(s) SubCutaneous before breakfast  insulin lispro Injectable (HumaLOG) 3 Unit(s) SubCutaneous with dinner  insulin lispro Injectable (HumaLOG) 4 Unit(s) SubCutaneous before lunch  predniSONE   Tablet 15 milliGRAM(s) Oral daily  predniSONE   Tablet   Oral     artificial tears (preservative free) Ophthalmic Solution 1 Drop(s) Both EYES three times a day  Biotene Dry Mouth Oral Rinse 5 milliLiter(s) Swish and Spit two times a day  cholecalciferol 2000 Unit(s) Oral daily  dextrose 5%. 1000 milliLiter(s) IV Continuous <Continuous>  multivitamin 1 Tablet(s) Oral daily  petrolatum Ophthalmic Ointment 1 Application(s) Both EYES at bedtime PRN  sodium chloride 0.65% Nasal 1 Spray(s) Both Nostrils two times a day PRN      PAST MEDICAL & SURGICAL HISTORY:  Hyperlipemia  Chronic sinusitis  Raynaud phenomenon  HTN (hypertension)  DM (diabetes mellitus)  Claustrophobia  COPD (chronic obstructive pulmonary disease)  S/P tonsillectomy      FAMILY HISTORY:  FH: MI (myocardial infarction) (Father)  FH: CABG (coronary artery bypass surgery) (Father)      SOCIAL HISTORY:    Non-smoker        REVIEW OF SYSTEMS:  General: no fatigue/malaise, weight loss/gain.  Skin: no rashes.  Ophthalmologic: no blurred vision, no loss of vision. 	  ENT: no sore throat, rhinorrhea, sinus congestion.  Respiratory: no SOB, cough or wheeze.  Gastrointestinal:  no N/V/D, no melena/hematemesis/hematochezia.  Genitourinary: no dysuria/hesitancy or hematuria.  Musculoskeletal: no myalgias or arthralgias.  Neurological: no changes in vision or hearing, no lightheadedness/dizziness, no syncope/near syncope	  Psychiatric: no unusual stress/anxiety.   Hematology/Lymphatics: no unusual bleeding, bruising and no lymphadenopathy.  Endocrine: no unusual thirst.   All others negative except as stated above and in HPI.    PHYSICAL EXAM:  T(C): 36.2 (01-06-19 @ 13:54), Max: 37.1 (01-06-19 @ 05:02)  HR: 105 (01-06-19 @ 17:15) (78 - 155)  BP: 118/66 (01-06-19 @ 17:15) (94/61 - 139/75)  RR: 19 (01-06-19 @ 13:54) (18 - 20)  SpO2: 98% (01-06-19 @ 13:54) (97% - 100%)  Wt(kg): --  I&O's Summary    05 Jan 2019 07:01  -  06 Jan 2019 07:00  --------------------------------------------------------  IN: 240 mL / OUT: 0 mL / NET: 240 mL    06 Jan 2019 07:01  -  06 Jan 2019 19:14  --------------------------------------------------------  IN: 340 mL / OUT: 0 mL / NET: 340 mL        Appearance: No distress  HEENT:   Normal oral mucosa, PERRL, EOMI	  Lymphatic: No lymphadenopathy  Cardiovascular: Irregular irregular, No JVD, No murmurs, 1+ LE edema  Respiratory: Mild expiratory wheezing	  Psychiatry: A & O x 3, Mood & affect appropriate  Gastrointestinal:  Soft, Non-tender, + BS	  Skin: No rashes, No ecchymoses, No cyanosis	  Neurologic: Non-focal  Extremities: Normal range of motion, No clubbing, cyanosis or edema  Vascular: Peripheral pulses palpable 2+ bilaterally        LABS:	 	    CBC Full  -  ( 06 Jan 2019 07:19 )  WBC Count : 7.3 K/uL  Hemoglobin : 10.2 g/dL  Hematocrit : 30.4 %  Platelet Count - Automated : 331 K/uL  Mean Cell Volume : 89.1 fl  Mean Cell Hemoglobin : 30.0 pg  Mean Cell Hemoglobin Concentration : 33.6 gm/dL  Auto Neutrophil # : x  Auto Lymphocyte # : x  Auto Monocyte # : x  Auto Eosinophil # : x  Auto Basophil # : x  Auto Neutrophil % : x  Auto Lymphocyte % : x  Auto Monocyte % : x  Auto Eosinophil % : x  Auto Basophil % : x    01-06    137  |  88<L>  |  19  ----------------------------<  195<H>  3.8   |  36<H>  |  1.02  01-05    137  |  89<L>  |  22  ----------------------------<  230<H>  4.4   |  36<H>  |  1.12    Ca    9.4      06 Jan 2019 07:19  Ca    9.1      05 Jan 2019 13:07  Phos  3.8     01-05  Mg     1.9     01-05    TPro  6.4  /  Alb  3.8  /  TBili  0.3  /  DBili  x   /  AST  17  /  ALT  44  /  AlkPhos  52  01-05      proBNP:   Lipid Profile:   HgA1c:   TSH:       CARDIAC MARKERS:            TELEMETRY: 	  Currently A-fib 100-120s  ECG:  	Two EKGs from today reviewed. One showed A-fib  RBBB, other EKG showed A-flutter vs A-tach  RBBB  RADIOLOGY:  OTHER: 	    PREVIOUS DIAGNOSTIC TESTING:    [ ] Echocardiogram: < from: Transthoracic Echocardiogram (12.07.18 @ 08:59) >  Dimensions:    Normal Values:  LA:     3.6    2.0 - 4.0 cm  Ao:     2.9    2.0 - 3.8 cm  SEPTUM: 0.9    0.6 - 1.2 cm  PWT:    0.8    0.6- 1.1 cm  LVIDd:  4.9    3.0 - 5.6 cm  LVIDs:  2.9    1.8 - 4.0 cm  Derived variables:  LVMI: 73 g/m2  RWT: 0.32  Fractional short: 40 %  EF (Teicholtz): 71 %  Doppler Peak Velocity (m/sec): AoV=1.2  ------------------------------------------------------------------------  Observations:  Mitral Valve: Normal mitral valve.  Aortic Valve/Aorta: Normal trileaflet aortic valve. Peak  transaortic valve gradient equals 6 mm Hg, mean transaortic  valve gradient equals 3 mm Hg, aortic valve velocity time  integral equals 22 cm. Trace aortic regurgitation.  Aortic Root: 2.9 cm.  Left Atrium: Normal left atrium.  LA volume index = 18  cc/m2.  Left Ventricle: Normal left ventricular systolic function.  No segmental wall motion abnormalities. Normal left  ventricular internal dimensions and wall thicknesses. Mild  diastolic dysfunction (Stage I).  Right Heart: Normal right atrium. Normal right ventricular  size and function. Normal tricuspid valve. Minimal  tricuspid regurgitation. Normal pulmonic valve.  Pericardium/Pleura: Normal pericardium with no pericardial  effusion.  Hemodynamic: Estimated right atrial pressure is 8 mm Hg.  Inadequate tricuspid regurgitation Doppler envelope  precludes estimation of RVSP.  ------------------------------------------------------------------------  Conclusions:  1. Normal mitral valve.  2. Normal trileaflet aortic valve. Peak transaortic valve  gradient equals 6 mm Hg, mean transaortic valve gradient  equals 3 mm Hg, aortic valve velocity time integral equals  22 cm. Trace aortic regurgitation.  3. Aortic Root: 2.9 cm.  4. Normal left atrium.  LA volume index = 18 cc/m2.  5. Normal left ventricular internal dimensions and wall  thicknesses.  6. Normal left ventricular systolic function. No segmental  wall motion abnormalities.  7. Mild diastolic dysfunction (Stage I).  8. Normal right ventricular size and function.  9. Inadequate tricuspid regurgitation Doppler envelope  precludes estimation of RVSP.  10. Normal tricuspid valve. Minimal tricuspid  regurgitation.    < end of copied text >    [ ]  Catheterization:   [ ] Stress Test: You can access the FollowMyHealth Patient Portal offered by [facility] by registering at the following website: [email or web address]. By joining [employer]’s FollowMyHealth portal, you will also be able to view your health information using other applications (apps) compatible with our system.

## 2023-10-05 PROBLEM — D64.9 ANEMIA: Status: ACTIVE | Noted: 2017-07-25

## 2023-10-05 PROBLEM — R92.8 ABNORMAL MAMMOGRAM: Status: ACTIVE | Noted: 2020-07-01

## 2023-10-05 PROBLEM — N18.9 CKD (CHRONIC KIDNEY DISEASE): Status: ACTIVE | Noted: 2023-01-01

## 2023-10-06 NOTE — PROGRESS NOTE ADULT - PROBLEM SELECTOR PLAN 1
36.3 - report of generalized weakness as well as leg weakness. Exam with diminished strength in left hip flexor 3-4/5.  - no urinary/bowel incontinence, no saddle anesthesia, no reported fall/trauma, and reports recent normal CT head this week. Patient REFUSES MR imaging for further evaluation at time of medicine admit despite recommendation.   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 Outpatient lab work this month with TSH,b12,folate, iron wnl.  - consult neurology  - Patient currently being tapered off of steroids (received IV steroid at [facility]) now on prednisone 40d. Given end-stage COPD would favor conservative steroid taper.  - per chart review: TTE 2018 and EVAN 2019 demonstrate normal LV/RV systolic function (home lasix is for HTN)  -continue with conservative taper  -PT eval is in progress  -neurology recommending EMG as an outpatient, and outpt f/u   -f/u full neuro recs

## 2023-12-27 ENCOUNTER — RX RENEWAL (OUTPATIENT)
Age: 63
End: 2023-12-27

## 2024-01-01 NOTE — PROGRESS NOTE ADULT - PROBLEM SELECTOR PLAN 9
Continue Imdur  Norvasc d/c per Cardiology reccs
Lovenox for DVT prophylaxis.
Lovenox for DVT prophylaxis.
Continue Imdur with Norvasc in place of Benicar.
-Continue Imdur.
Continue Imdur
Continue Imdur  Norvasc d/c per Cardiology reccs
Continue Imdur with Norvasc in place of Benicar.
Continue Nystatin.
Continue Nystatin.
Continue current meds.
Lovenox for DVT prophylaxis.
Continue Imdur
Continue Imdur  Norvasc d/c per Cardiology reccs
Continue Imdur  d/c Norvasc per Cardiology reccs
Continue Imdur, Norvasc
5

## 2024-01-02 ENCOUNTER — RX RENEWAL (OUTPATIENT)
Age: 64
End: 2024-01-02

## 2024-01-02 RX ORDER — ERYTHROPOIETIN 40000 [IU]/ML
40000 INJECTION, SOLUTION INTRAVENOUS; SUBCUTANEOUS
Qty: 4 | Refills: 0 | Status: DISCONTINUED | COMMUNITY
Start: 2022-08-29 | End: 2024-01-02

## 2024-01-03 NOTE — ED ADULT NURSE NOTE - NSIMPLEMENTINTERV_GEN_ALL_ED
Vaccine status unknown Implemented All Fall Risk Interventions:  [place] to call system. Call bell, personal items and telephone within reach. Instruct patient to call for assistance. Room bathroom lighting operational. Non-slip footwear when patient is off stretcher. Physically safe environment: no spills, clutter or unnecessary equipment. Stretcher in lowest position, wheels locked, appropriate side rails in place. Provide visual cue, wrist band, yellow gown, etc. Monitor gait and stability. Monitor for mental status changes and reorient to person, place, and time. Review medications for side effects contributing to fall risk. Reinforce activity limits and safety measures with patient and family.

## 2024-01-30 NOTE — PROGRESS NOTE ADULT - ASSESSMENT
Procedures performed: left total knee arthroplasty   Date of procedure:  12/20/23   Location of procedure: [facility]    Ok to refill per Dr. Contreras protocol.    Closing encounter.   60F w COPD on 3LNC (?pending transplant list at [facility]), former smoker, CAD s/p stent (2016), afib on eliquis, uncontrolled DM2, raynaud phenomenon and recent hospitalization at [facility] for altered mental status and [facility] aw COPD exacerbation, LE swelling, weakness.     Prolonged hospitalization.

## 2024-02-09 NOTE — PROGRESS NOTE ADULT - SUBJECTIVE AND OBJECTIVE BOX
"                                                         URI   If your condition worsens or fails to improve we recommend that you receive another evaluation at the urgent care/ER immediately or contact your PCP to discuss your concerns. You must understand that you've received an urgent care treatment only and that you may be released before all your medical problems are known or treated. You the patient will arrange for follouwp care as instructed.   If we discussed that I think your illness is viral, it will not respond to antibiotics and will last 10-14 days.       Flonase (fluticasone) is a nasal spray which is available over the counter and may help with your symptoms.   Zyrtec D, Claritin D or Allegra D can also help with symptoms of congestion and drainage.   If you have hypertension avoid using the "D" which is the decongestant   If you just have drainage you can take plain zyrtec, claritin or allegra     Rest and fluids are also important.     Salt water gargles, warm tea with honey and chloraseptic spray as needed for sore throat.   Tylenol or ibuprofen can also be used as directed for pain unless you have an allergy to them or medical condition such as stomach ulcers, kidney or liver disease or blood thinners etc for which you should not be taking these type of medications.         " Patient is a 60y old  Female who presents with a chief complaint of COPD exacerbation, PNA (08 Oct 2020 11:37)       HPI:  60F w/ end stage COPD on 3LNC (pending transplant list at [facility]), former smoker, CAD s/p stent (2016), afib on eliquis, uncontrolled DM2 (on insulin), raynaud phenomenon and recent hospitalization at [facility] for confusion and [facility] p/w sob. [facility] Patient states that she has had worsening shortness of breath for past two days. Unable to obtain comprehensive history due to dyspnea. Patient denies fever, chills, sore throat, cough, chest pain, palpitations, abd pain, n/v. Currently feels short of breath even after receiving duonebs and steroids in the ED. Unable to keep mask on during interview and lay back in stretcher due to subjective sob. Patient repeatedly stating that it is too hot in her room and fanning herself with a paper. Per chart, Dr. Mathew (PCP) has chart notes regarding possible hallucinations. Would like her home medications now but otherwise has no acute complaints.  Has not seen nephrologist.  Denies any N/V.  SOB improving.  States she is urinating well.  Denies any urinary retention.  Denies any supplement or NSAID usage.         Emotional, but no new complaints.  Still urinating well.     PAST MEDICAL & SURGICAL HISTORY:  H/O Raynaud&#x27;s syndrome    Ascorbic acid deficiency    Iron deficiency anemia    Constipation    GERD without esophagitis    Type 2 diabetes mellitus    HTN (hypertension)    Heart failure    HLD (hyperlipidemia)    History of chronic atrial fibrillation  on Eliquis    End stage COPD  on 3LNC    Atrial fibrillation    Hyperlipemia    Chronic sinusitis    Raynaud phenomenon    HTN (hypertension)    DM (diabetes mellitus)    Claustrophobia    COPD (chronic obstructive pulmonary disease)    History of tonsillectomy    S/P tonsillectomy         FAMILY HISTORY:  FH: myocardial infarction    Family history of CABG    FH: MI (myocardial infarction)    FH: CABG (coronary artery bypass surgery)    NC    Social History:Non smoker    MEDICATIONS  (STANDING):  apixaban 5 milliGRAM(s) Oral every 12 hours  ascorbic acid 500 milliGRAM(s) Oral daily  aspirin  chewable 81 milliGRAM(s) Oral daily  atorvastatin 80 milliGRAM(s) Oral at bedtime  azithromycin  IVPB 500 milliGRAM(s) IV Intermittent every 24 hours  budesonide 160 MICROgram(s)/formoterol 4.5 MICROgram(s) Inhaler 2 Puff(s) Inhalation two times a day  cefepime   IVPB 2000 milliGRAM(s) IV Intermittent every 12 hours  cholecalciferol 1000 Unit(s) Oral daily  dextrose 5%. 1000 milliLiter(s) (50 mL/Hr) IV Continuous <Continuous>  dextrose 50% Injectable 12.5 Gram(s) IV Push once  dextrose 50% Injectable 25 Gram(s) IV Push once  dextrose 50% Injectable 25 Gram(s) IV Push once  ferrous    sulfate 325 milliGRAM(s) Oral daily  insulin glargine Injectable (LANTUS) 10 Unit(s) SubCutaneous at bedtime  insulin lispro (HumaLOG) corrective regimen sliding scale   SubCutaneous three times a day before meals  insulin lispro Injectable (HumaLOG) 3 Unit(s) SubCutaneous three times a day before meals  montelukast 10 milliGRAM(s) Oral at bedtime  multivitamin 1 Tablet(s) Oral daily  pantoprazole    Tablet 40 milliGRAM(s) Oral before breakfast  polyethylene glycol 3350 17 Gram(s) Oral daily  potassium chloride    Tablet ER 40 milliEquivalent(s) Oral every 4 hours  senna 2 Tablet(s) Oral at bedtime  tiotropium 18 MICROgram(s) Capsule 1 Capsule(s) Inhalation daily    MEDICATIONS  (PRN):  acetaminophen   Tablet .. 650 milliGRAM(s) Oral every 6 hours PRN Mild Pain (1 - 3)  bisacodyl Suppository 10 milliGRAM(s) Rectal daily PRN Constipation  dextrose 40% Gel 15 Gram(s) Oral once PRN Blood Glucose LESS THAN 70 milliGRAM(s)/deciliter  glucagon  Injectable 1 milliGRAM(s) IntraMuscular once PRN Glucose LESS THAN 70 milligrams/deciliter  guaiFENesin   Syrup  (Sugar-Free) 100 milliGRAM(s) Oral every 6 hours PRN Cough  lactulose Syrup 15 Gram(s) Oral two times a day PRN constipation  levalbuterol Inhalation 0.63 milliGRAM(s) Inhalation every 4 hours PRN shortness of breath and/or wheezing  oxyCODONE    IR 5 milliGRAM(s) Oral every 6 hours PRN Moderate Pain (4 - 6)   Meds reviewed    Allergies    No Known Allergies    Intolerances    albuterol (Unknown)       REVIEW OF SYSTEMS:    CONSTITUTIONAL:  No weight loss   EYES: No eye pain, visual disturbances, or discharge  ENMT:  No difficulty hearing, tinnitus, vertigo; No sinus or throat pain  NECK: No pain or stiffness  BREASTS: No pain, masses, or nipple discharge  RESPIRATORY: +SOB. No wheeze. No NIELSON  CARDIOVASCULAR: No chest pain, palpitations, dizziness,   GASTROINTESTINAL: No abdominal or epigastric pain. No nausea, vomiting, or hematemesis;  GENITOURINARY: No dysuria, frequency, hematuria, or incontinence  NEUROLOGICAL: No headaches, memory loss, loss of strength, numbness, or tremors  SKIN: no Diffuse erythema, no blisters  LYMPH NODES: No enlarged glands  ENDOCRINE: No heat or cold intolerance; No hair loss  MUSCULOSKELETAL: No joint pain or swelling   PSYCHIATRIC: No depression, anxiety, mood swings, or difficulty sleeping  ALLERGY AND IMMUNOLOGIC: No hives or eczema      ICU Vital Signs Last 24 Hrs  T(C): 36.8 (09 Oct 2020 13:34), Max: 36.8 (09 Oct 2020 13:34)  T(F): 98.3 (09 Oct 2020 13:34), Max: 98.3 (09 Oct 2020 13:34)  HR: 93 (09 Oct 2020 13:49) (67 - 104)  BP: 139/75 (09 Oct 2020 13:34) (132/76 - 139/75)  BP(mean): --  ABP: --  ABP(mean): --  RR: 18 (09 Oct 2020 13:34) (18 - 20)  SpO2: 93% (09 Oct 2020 13:49) (93% - 100%)        PHYSICAL EXAM:    GENERAL: No distress  HEAD:  Atraumatic, Normocephalic  EYES: EOMI, conjunctiva and sclera clear  ENMT: No drainage from nares, no drainage from pinna  NECK: Supple, neck  veins full  NERVOUS SYSTEM:  Alert & Oriented X3, Good concentration; Motor Strength wnl upper and lower extremities  CHEST/LUNG: Decreased BS, L rales, no rhonchi, wheezing, or rubs  HEART: Regular rate and rhythm; No murmurs, rubs, or gallops  ABDOMEN: Soft, Nontender, Nondistended; Bowel sounds present,  EXTREMITIES: trace Edema  SKIN: No rashes or lesions, pale      LABS:                                  8.9    10.88 )-----------( 483      ( 11 Oct 2020 07:04 )             30.4     10-11    139  |  99  |  36<H>  ----------------------------<  317<H>  4.4   |  34<H>  |  0.94    Ca    9.2      11 Oct 2020 07:04  Phos  2.9     10-10  Mg     2.4     10-10    TPro  6.3  /  Alb  2.4<L>  /  TBili  0.2  /  DBili  x   /  AST  12<L>  /  ALT  16  /  AlkPhos  70  10-11                RADIOLOGY & ADDITIONAL TESTS:  Magnesium, Serum: 2.4 mg/dL (10-10 @ 06:48)  Phosphorus Level, Serum: 2.9 mg/dL (10-10 @ 06:48)          RADIOLOGY & ADDITIONAL TESTS:

## 2024-02-12 NOTE — PATIENT PROFILE ADULT - HAS THE PATIENT EXPERIENCED ANY OF THE FOLLOWING WITHIN THE WEEK PRIOR TO ADMISSION?
[facility]  [address]  [place] [address]    Phone: [phone]    APPT: Wednesday, 2/21/24 at 2:30 PM with Dr Ocampo to discuss large baby and type of delivery.  NEED to arrive at 2:15 PM  
no

## 2024-12-13 NOTE — ASSESSMENT
[FreeTextEntry1] : Recent AECB vs. PNA\par Has completed course of antibiotics\par Monitor fever curve\par Monitor oxygen saturations–if severely short of breath or with persistently poor saturations to return to ER\par Will try to arrange for home portable chest x-ray to follow-up pneumonia\par WBC WNL\par \par The patient has end-stage emphysema\par Lung transplantation has been declined by [facility] and [facility]\par She will continue BiPAP therapy 24 hours/day\par Advised the patient and her brother that I did obtain authorization for the new ventilator that was requested; I did a peer to peer visit with the medical director at [facility]\par She will continue oxygen 24 hours/day\par She will continue Symbicort, Spiriva, Singulair\par Has completed 5-day course of prednisone\par She will continue nebulizer therapy every 4-6 hours\par She will use an Acapella device to assist with secretion clearance\par If this is ineffective then we will order a smart vest to assist with secretion clearance\par Mucinex bid or\par She will add Mucomyst 10% solution 2 cc to her nebulizer therapy twice daily\par Hold Zithromax for now\par She declines further chest CT/PFTs\par She had 2020 flu vaccine and Covid vaccine\par She no longer smokes\par PT as tolerated\par Cardiology follow-up\par \par DM\par FSG's tid prior to meals\par Weight control\par ADA diet\par Continue present meds = Jardiance/Metformin/glimepiride\par Ophtho and podiatry declined\par Endocrine follow-up declined\par \par HTN\par Had recent bout of chest discomfort\par Had unremarkable cardiac evaluation while hospitalized\par She reports good BP control\par No recurrent AF or SVT\par Continue diltiazem, Eliquis, Brilinta\par Close f/u of CR/BUN - on lasix 40 mg daily/// last CR stable\par Cardiology follow-up\par \par Hyperlipidemia\par Low fat diet\par Weight control\par On daily Crestor\par Monitor lipid profile\par \par Crackling in ears–suspect ETD\par Flonase 1 spray per nostril daily to twice daily\par Decongestant as needed\par Humidifier\par Saline nasal gel/rinses\par ENT f/u\par \par Anemia\par HGB stable at 9.7\par Will arrange for FIT testing given dark stools\par Continue iron\par Continue weekly injection\par Hematology f/u\par Transfuse as needed\par \par No prescription renewals needed\par See result note\par She will do a TEB visit in 2 weeks and as needed\par She will call if her status worsens \par She will call for any medical or pulmonary issues\par All of her questions were answered\par All of the above was d/w her and her brother\par She and her brother verbally confirmed understanding of all of the above and agreement with the above plan\par We will arrange a home blood draw weekly Initial (On Arrival)

## 2025-02-19 NOTE — PROGRESS NOTE ADULT - PROBLEM SELECTOR PLAN 3
Patient called back, patient request MRO to be faxed to [facility] in [place]. Please advise Patient at [phone], if any further questions.    A1C 7.2  -c/w Lantus 4 units QHS  -Humalog decreased to 3-3-2 units  w/meals   - Continue to monitor blood sugars.  - Endocrine following

## 2025-04-24 NOTE — HISTORY OF PRESENT ILLNESS
[Family Member] : family member [FreeTextEntry8] : Comes in for acute medical visit.\par Feeling better then she was but not back to baseline.\par Saw GYN\par Had mammograms.\par Started WW. Struggling to lose weight.\par Denies calf pain or increased edema.\par Denies abdominal pain or n/v/d.\par Denies chest pain.\par Coughing less.\par Denies hemoptysis.\par Denies fevers.\par Less SOB but breathing still difficult.\par No ear pain or sore throat.\par Has nasal symptoms n/a